# Patient Record
Sex: MALE | Race: WHITE | NOT HISPANIC OR LATINO | Employment: OTHER | URBAN - METROPOLITAN AREA
[De-identification: names, ages, dates, MRNs, and addresses within clinical notes are randomized per-mention and may not be internally consistent; named-entity substitution may affect disease eponyms.]

---

## 2017-04-30 ENCOUNTER — APPOINTMENT (OUTPATIENT)
Dept: RADIOLOGY | Facility: MEDICAL CENTER | Age: 52
DRG: 690 | End: 2017-04-30
Attending: EMERGENCY MEDICINE
Payer: MEDICARE

## 2017-04-30 ENCOUNTER — HOSPITAL ENCOUNTER (INPATIENT)
Facility: MEDICAL CENTER | Age: 52
LOS: 1 days | DRG: 690 | End: 2017-05-01
Attending: EMERGENCY MEDICINE | Admitting: HOSPITALIST
Payer: MEDICARE

## 2017-04-30 ENCOUNTER — RESOLUTE PROFESSIONAL BILLING HOSPITAL PROF FEE (OUTPATIENT)
Dept: HOSPITALIST | Facility: MEDICAL CENTER | Age: 52
End: 2017-04-30
Payer: MEDICARE

## 2017-04-30 DIAGNOSIS — N39.0 ACUTE UTI: ICD-10-CM

## 2017-04-30 DIAGNOSIS — N12 PYELONEPHRITIS: ICD-10-CM

## 2017-04-30 PROBLEM — N17.9 AKI (ACUTE KIDNEY INJURY) (HCC): Status: ACTIVE | Noted: 2017-04-30

## 2017-04-30 PROBLEM — E87.1 HYPONATREMIA: Status: ACTIVE | Noted: 2017-04-30

## 2017-04-30 LAB
ALBUMIN SERPL BCP-MCNC: 3.1 G/DL (ref 3.2–4.9)
ALBUMIN/GLOB SERPL: 0.8 G/DL
ALP SERPL-CCNC: 109 U/L (ref 30–99)
ALT SERPL-CCNC: 16 U/L (ref 2–50)
ANION GAP SERPL CALC-SCNC: 11 MMOL/L (ref 0–11.9)
APPEARANCE UR: ABNORMAL
APPEARANCE UR: ABNORMAL
AST SERPL-CCNC: 28 U/L (ref 12–45)
BACTERIA #/AREA URNS HPF: ABNORMAL /HPF
BASOPHILS # BLD AUTO: 0.9 % (ref 0–1.8)
BASOPHILS # BLD: 0.14 K/UL (ref 0–0.12)
BILIRUB SERPL-MCNC: 1.4 MG/DL (ref 0.1–1.5)
BILIRUB UR QL STRIP.AUTO: NEGATIVE
BUN SERPL-MCNC: 54 MG/DL (ref 8–22)
CALCIUM SERPL-MCNC: 8.6 MG/DL (ref 8.5–10.5)
CHLORIDE SERPL-SCNC: 94 MMOL/L (ref 96–112)
CO2 SERPL-SCNC: 24 MMOL/L (ref 20–33)
COLOR UR AUTO: YELLOW
COLOR UR: YELLOW
CREAT SERPL-MCNC: 2.19 MG/DL (ref 0.5–1.4)
CULTURE IF INDICATED INDCX: YES UA CULTURE
EOSINOPHIL # BLD AUTO: 0.05 K/UL (ref 0–0.51)
EOSINOPHIL NFR BLD: 0.3 % (ref 0–6.9)
ERYTHROCYTE [DISTWIDTH] IN BLOOD BY AUTOMATED COUNT: 58.4 FL (ref 35.9–50)
GFR SERPL CREATININE-BSD FRML MDRD: 32 ML/MIN/1.73 M 2
GLOBULIN SER CALC-MCNC: 4.1 G/DL (ref 1.9–3.5)
GLUCOSE SERPL-MCNC: 108 MG/DL (ref 65–99)
GLUCOSE UR QL STRIP.AUTO: NEGATIVE MG/DL
GLUCOSE UR STRIP.AUTO-MCNC: NEGATIVE MG/DL
HCT VFR BLD AUTO: 61.3 % (ref 42–52)
HGB BLD-MCNC: 19.6 G/DL (ref 14–18)
IMM GRANULOCYTES # BLD AUTO: 0.17 K/UL (ref 0–0.11)
IMM GRANULOCYTES NFR BLD AUTO: 1.1 % (ref 0–0.9)
KETONES UR QL STRIP.AUTO: NEGATIVE MG/DL
KETONES UR STRIP.AUTO-MCNC: NEGATIVE MG/DL
LEUKOCYTE ESTERASE UR QL STRIP.AUTO: ABNORMAL
LEUKOCYTE ESTERASE UR QL STRIP.AUTO: ABNORMAL
LIPASE SERPL-CCNC: 46 U/L (ref 11–82)
LYMPHOCYTES # BLD AUTO: 0.88 K/UL (ref 1–4.8)
LYMPHOCYTES NFR BLD: 5.5 % (ref 22–41)
MCH RBC QN AUTO: 23.8 PG (ref 27–33)
MCHC RBC AUTO-ENTMCNC: 32 G/DL (ref 33.7–35.3)
MCV RBC AUTO: 74.4 FL (ref 81.4–97.8)
MICRO URNS: ABNORMAL
MONOCYTES # BLD AUTO: 1.88 K/UL (ref 0–0.85)
MONOCYTES NFR BLD AUTO: 11.7 % (ref 0–13.4)
MUCOUS THREADS #/AREA URNS HPF: ABNORMAL /HPF
NEUTROPHILS # BLD AUTO: 12.95 K/UL (ref 1.82–7.42)
NEUTROPHILS NFR BLD: 80.5 % (ref 44–72)
NITRITE UR QL STRIP.AUTO: NEGATIVE
NITRITE UR QL STRIP.AUTO: NEGATIVE
NRBC # BLD AUTO: 0 K/UL
NRBC BLD AUTO-RTO: 0 /100 WBC
PH UR STRIP.AUTO: 5 [PH]
PH UR STRIP.AUTO: 5.5 [PH]
PLATELET # BLD AUTO: 175 K/UL (ref 164–446)
POTASSIUM SERPL-SCNC: 3.9 MMOL/L (ref 3.6–5.5)
PROT SERPL-MCNC: 7.2 G/DL (ref 6–8.2)
PROT UR QL STRIP: 100 MG/DL
PROT UR QL STRIP: 50 MG/DL
RBC # BLD AUTO: 8.24 M/UL (ref 4.7–6.1)
RBC # URNS HPF: ABNORMAL /HPF
RBC UR QL AUTO: ABNORMAL
RBC UR QL AUTO: ABNORMAL
SODIUM SERPL-SCNC: 129 MMOL/L (ref 135–145)
SP GR UR STRIP.AUTO: 1.01
SP GR UR: 1.01
WBC # BLD AUTO: 16.1 K/UL (ref 4.8–10.8)
WBC #/AREA URNS HPF: >150 /HPF

## 2017-04-30 PROCEDURE — 83690 ASSAY OF LIPASE: CPT

## 2017-04-30 PROCEDURE — 700101 HCHG RX REV CODE 250: Performed by: EMERGENCY MEDICINE

## 2017-04-30 PROCEDURE — 304562 HCHG STAT O2 MASK/CANNULA

## 2017-04-30 PROCEDURE — 94640 AIRWAY INHALATION TREATMENT: CPT

## 2017-04-30 PROCEDURE — 96361 HYDRATE IV INFUSION ADD-ON: CPT

## 2017-04-30 PROCEDURE — 85025 COMPLETE CBC W/AUTO DIFF WBC: CPT

## 2017-04-30 PROCEDURE — 36415 COLL VENOUS BLD VENIPUNCTURE: CPT

## 2017-04-30 PROCEDURE — 94760 N-INVAS EAR/PLS OXIMETRY 1: CPT

## 2017-04-30 PROCEDURE — 87186 SC STD MICRODIL/AGAR DIL: CPT

## 2017-04-30 PROCEDURE — 80053 COMPREHEN METABOLIC PANEL: CPT

## 2017-04-30 PROCEDURE — 99285 EMERGENCY DEPT VISIT HI MDM: CPT

## 2017-04-30 PROCEDURE — 96375 TX/PRO/DX INJ NEW DRUG ADDON: CPT

## 2017-04-30 PROCEDURE — 87077 CULTURE AEROBIC IDENTIFY: CPT

## 2017-04-30 PROCEDURE — 99223 1ST HOSP IP/OBS HIGH 75: CPT | Mod: 25 | Performed by: HOSPITALIST

## 2017-04-30 PROCEDURE — 700111 HCHG RX REV CODE 636 W/ 250 OVERRIDE (IP): Performed by: EMERGENCY MEDICINE

## 2017-04-30 PROCEDURE — 81002 URINALYSIS NONAUTO W/O SCOPE: CPT

## 2017-04-30 PROCEDURE — 74176 CT ABD & PELVIS W/O CONTRAST: CPT

## 2017-04-30 PROCEDURE — 306637 HCHG MISC ORTHO ITEM RC 0274

## 2017-04-30 PROCEDURE — 87086 URINE CULTURE/COLONY COUNT: CPT

## 2017-04-30 PROCEDURE — 99406 BEHAV CHNG SMOKING 3-10 MIN: CPT | Performed by: HOSPITALIST

## 2017-04-30 PROCEDURE — 96365 THER/PROPH/DIAG IV INF INIT: CPT

## 2017-04-30 PROCEDURE — 81001 URINALYSIS AUTO W/SCOPE: CPT

## 2017-04-30 RX ORDER — CEFTRIAXONE 2 G/1
2 INJECTION, POWDER, FOR SOLUTION INTRAMUSCULAR; INTRAVENOUS ONCE
Status: COMPLETED | OUTPATIENT
Start: 2017-04-30 | End: 2017-04-30

## 2017-04-30 RX ORDER — IPRATROPIUM BROMIDE AND ALBUTEROL SULFATE 2.5; .5 MG/3ML; MG/3ML
3 SOLUTION RESPIRATORY (INHALATION)
Status: DISCONTINUED | OUTPATIENT
Start: 2017-04-30 | End: 2017-05-01

## 2017-04-30 RX ORDER — MORPHINE SULFATE 4 MG/ML
4 INJECTION, SOLUTION INTRAMUSCULAR; INTRAVENOUS ONCE
Status: DISCONTINUED | OUTPATIENT
Start: 2017-04-30 | End: 2017-05-01

## 2017-04-30 RX ORDER — SODIUM CHLORIDE, SODIUM LACTATE, POTASSIUM CHLORIDE, CALCIUM CHLORIDE 600; 310; 30; 20 MG/100ML; MG/100ML; MG/100ML; MG/100ML
INJECTION, SOLUTION INTRAVENOUS ONCE
Status: COMPLETED | OUTPATIENT
Start: 2017-04-30 | End: 2017-04-30

## 2017-04-30 RX ORDER — SODIUM CHLORIDE, SODIUM LACTATE, POTASSIUM CHLORIDE, CALCIUM CHLORIDE 600; 310; 30; 20 MG/100ML; MG/100ML; MG/100ML; MG/100ML
1000 INJECTION, SOLUTION INTRAVENOUS ONCE
Status: DISCONTINUED | OUTPATIENT
Start: 2017-04-30 | End: 2017-05-01

## 2017-04-30 RX ORDER — ONDANSETRON 2 MG/ML
4 INJECTION INTRAMUSCULAR; INTRAVENOUS ONCE
Status: COMPLETED | OUTPATIENT
Start: 2017-04-30 | End: 2017-04-30

## 2017-04-30 RX ADMIN — IPRATROPIUM BROMIDE AND ALBUTEROL SULFATE 3 ML: .5; 3 SOLUTION RESPIRATORY (INHALATION) at 22:48

## 2017-04-30 RX ADMIN — SODIUM CHLORIDE, POTASSIUM CHLORIDE, SODIUM LACTATE AND CALCIUM CHLORIDE 1000 ML: 600; 310; 30; 20 INJECTION, SOLUTION INTRAVENOUS at 22:01

## 2017-04-30 RX ADMIN — CEFTRIAXONE SODIUM 2 G: 2 INJECTION, POWDER, FOR SOLUTION INTRAMUSCULAR; INTRAVENOUS at 23:10

## 2017-04-30 RX ADMIN — ONDANSETRON 4 MG: 2 INJECTION INTRAMUSCULAR; INTRAVENOUS at 21:58

## 2017-04-30 ASSESSMENT — PAIN SCALES - GENERAL: PAINLEVEL_OUTOF10: 7

## 2017-04-30 NOTE — ED NOTES
Pt to triage ..  Chief Complaint   Patient presents with   • Blood in Urine     x 5 days    • Flank Pain     bilateral flank pain

## 2017-05-01 VITALS
DIASTOLIC BLOOD PRESSURE: 76 MMHG | BODY MASS INDEX: 40.23 KG/M2 | HEIGHT: 74 IN | OXYGEN SATURATION: 92 % | WEIGHT: 313.49 LBS | TEMPERATURE: 98.3 F | SYSTOLIC BLOOD PRESSURE: 124 MMHG | RESPIRATION RATE: 20 BRPM | HEART RATE: 67 BPM

## 2017-05-01 PROCEDURE — 700102 HCHG RX REV CODE 250 W/ 637 OVERRIDE(OP): Performed by: HOSPITALIST

## 2017-05-01 PROCEDURE — A9270 NON-COVERED ITEM OR SERVICE: HCPCS | Performed by: HOSPITALIST

## 2017-05-01 RX ORDER — NICOTINE 21 MG/24HR
14 PATCH, TRANSDERMAL 24 HOURS TRANSDERMAL
Status: DISCONTINUED | OUTPATIENT
Start: 2017-05-01 | End: 2017-05-01 | Stop reason: HOSPADM

## 2017-05-01 RX ORDER — TIOTROPIUM BROMIDE 18 UG/1
1 CAPSULE ORAL; RESPIRATORY (INHALATION)
Status: DISCONTINUED | OUTPATIENT
Start: 2017-05-01 | End: 2017-05-01 | Stop reason: HOSPADM

## 2017-05-01 RX ORDER — DEXTROSE MONOHYDRATE 25 G/50ML
25 INJECTION, SOLUTION INTRAVENOUS
Status: DISCONTINUED | OUTPATIENT
Start: 2017-04-30 | End: 2017-05-01 | Stop reason: HOSPADM

## 2017-05-01 RX ORDER — BISACODYL 10 MG
10 SUPPOSITORY, RECTAL RECTAL
Status: DISCONTINUED | OUTPATIENT
Start: 2017-04-30 | End: 2017-05-01 | Stop reason: HOSPADM

## 2017-05-01 RX ORDER — ACETAMINOPHEN 325 MG/1
650 TABLET ORAL EVERY 6 HOURS PRN
Status: DISCONTINUED | OUTPATIENT
Start: 2017-04-30 | End: 2017-05-01 | Stop reason: HOSPADM

## 2017-05-01 RX ORDER — MORPHINE SULFATE 4 MG/ML
2 INJECTION, SOLUTION INTRAMUSCULAR; INTRAVENOUS
Status: DISCONTINUED | OUTPATIENT
Start: 2017-04-30 | End: 2017-05-01 | Stop reason: HOSPADM

## 2017-05-01 RX ORDER — BUDESONIDE AND FORMOTEROL FUMARATE DIHYDRATE 80; 4.5 UG/1; UG/1
2 AEROSOL RESPIRATORY (INHALATION)
Status: DISCONTINUED | OUTPATIENT
Start: 2017-05-01 | End: 2017-05-01 | Stop reason: HOSPADM

## 2017-05-01 RX ORDER — OXYCODONE HYDROCHLORIDE 5 MG/1
2.5 TABLET ORAL
Status: DISCONTINUED | OUTPATIENT
Start: 2017-04-30 | End: 2017-05-01 | Stop reason: HOSPADM

## 2017-05-01 RX ORDER — SODIUM CHLORIDE 9 MG/ML
INJECTION, SOLUTION INTRAVENOUS CONTINUOUS
Status: DISCONTINUED | OUTPATIENT
Start: 2017-05-01 | End: 2017-05-01 | Stop reason: HOSPADM

## 2017-05-01 RX ORDER — AMOXICILLIN 250 MG
2 CAPSULE ORAL 2 TIMES DAILY
Status: DISCONTINUED | OUTPATIENT
Start: 2017-05-01 | End: 2017-05-01 | Stop reason: HOSPADM

## 2017-05-01 RX ORDER — OXYCODONE HYDROCHLORIDE 5 MG/1
5 TABLET ORAL
Status: DISCONTINUED | OUTPATIENT
Start: 2017-04-30 | End: 2017-05-01 | Stop reason: HOSPADM

## 2017-05-01 RX ORDER — POLYETHYLENE GLYCOL 3350 17 G/17G
1 POWDER, FOR SOLUTION ORAL
Status: DISCONTINUED | OUTPATIENT
Start: 2017-04-30 | End: 2017-05-01 | Stop reason: HOSPADM

## 2017-05-01 NOTE — H&P
CHIEF COMPLAINT:  Burning with urination and back pain.    PRIMARY MEDICAL PHYSICIAN:  None.    HISTORY OF PRESENT ILLNESS:  This is a 51-year-old gentleman with reported   history of asthma and COPD, and active tobacco abuse who comes in with   complaints of dysuria and right lower back pain.  The patient states that his   dysuria started seven days ago.  He tried to self-medicate by drinking a lot   of cranberry juice and initially he had some improvement, however, he also had   an associated right costovertebral angle tenderness, which would not go away.    He did think that his back pain was secondary to his bed, which apparently   has a bend and is not supportive.  He has had subjective fevers and shaking   chills.  He denies any nausea, vomiting, diarrhea, or abdominal pain.  He does   have shortness of breath at baseline and is not on any home oxygen.  He   reports a known history of COPD and asthma and is only on nebulizers.  He   states that he attempted to get inhalers from his pharmacist, but was told   that he needed a primary care physician.  When I asked where his albuterol   nebulizer came from, he states that it came from a doctor in [place].  When   I asked him how long he has been in [place], he states that he has been in   [place] for 14 years and has not yet established a primary care provider.  He   denies any chest pain and otherwise, prior to this was in his usual state of   health.    REVIEW OF SYSTEMS:  A full review of systems was completed and all pertinent   positives and negatives are included in the HPI above.    PAST MEDICAL HISTORY:  1.  COPD and asthma.  2.  Tobacco abuse.    PAST SURGICAL HISTORY:  Umbilical hernia repair.    SOCIAL HISTORY:  The patient smokes cigarettes and is down to 1-1/2 cigarettes   per day.  Prior to this, he smoked up to half-a-pack a day and has done so   for more than 20 years.  He denies any alcohol or illicit drugs.    FAMILY HISTORY:  Father  from  heart disease.  Mother  from   complications relating to Parkinson's disease.    ALLERGIES:  PENICILLINS AND ERYTHROMYCIN.    HOME MEDICATIONS:  Albuterol nebulizer only.    PHYSICAL EXAMINATION:  VITAL SIGNS:  Temperature 98.3, heart rate 92, respiration 20, and blood   pressure 124/76.  Patient is saturating at 95% on 2 L of oxygen.  GENERAL:  This is a middle-aged white male who appears much older than his   stated age.  He is in no acute distress.  HEENT:  Normocephalic and atraumatic.  EOMI, dry mucous membranes.  NECK:  The neck is supple.  There is no JVD.  There is no supraclavicular   adenopathy.  CARDIOVASCULAR:  Positive S1, S2, regular rate and rhythm.  No murmurs, rubs,   or gallops appreciated.  PULMONARY:  Clear to auscultation bilaterally.  No wheezes, rubs, or rhonchi   heard.  Very poor air movement, however, diminished at the bases.  BACK:  Right costovertebral angle tenderness.  ABDOMEN:  Soft, nontender, nondistended, and obese.  Positive bowel sounds.    Difficult to assess for hepatosplenomegaly secondary to habitus.  EXTREMITIES:  Warm and well perfused.  The patient has venous stasis changes   of the lower extremities.  Otherwise, capillary refill less than 2 seconds.    Distal pulses are intact.  NEUROLOGICAL:  A and O x3.  Cranial nerves II-XII grossly intact.    LABORATORY DATA:  WBC 16.1, hemoglobin 19.6, hematocrit 61.3, and platelet   175,000.  Sodium 129, potassium 3.9, chloride 94, CO2 24, glucose 108, BUN 54,   creatinine 2.19, and GFR is 32.  Urinalysis is positive for large leukocyte   esterase, bacteria.    DIAGNOSTIC IMAGING:  CT of the abdomen:  No hydronephrosis, tiny left lower   pole calyceal stone, bilateral perinephric fat stranding.  This is frequently   seen as a consequence of aging.  However, it could also indicate   pyelonephritis or other acute renal disease in the appropriate clinical   setting.  Please see full report for details.    ASSESSMENT AND PLAN:  This  is a 51-year-old gentleman who comes in with   complaints of dysuria and right lower back pain.  Upon assessment in the   emergency room, he has evidence of a urinary tract infection and possible   pyelonephritis.  1.  Urinary tract infection and possible pyelonephritis:  Fat stranding as   seen on the CT scan bilaterally.  Patient does have right-sided costovertebral   angle tenderness.  He has leukocytosis, otherwise, he is afebrile with stable   vital signs.  I will continue him on intravenous ceftriaxone and monitor the   urine cultures for sensitivities and speciation.  He will receive symptomatic   management for pain.  2.  Hyponatremia:  Suspected from infection.  He will be receiving intravenous   fluids and we will repeat renal function in the morning.  3.  Acute kidney injury:  The patient's only available renal functions are   from 2013 and at that time, he had no renal dysfunction.  He has no evidence   of hydronephrosis on CT scan.  This is likely prerenal from his infection and   mild dehydration.  He will be receiving intravenous fluids and we will monitor   renal function in the morning.  In the meantime, I will check urine   electrolytes.  4.  Chronic obstructive pulmonary disease, asthma:  The patient is not on   appropriate medications.  I have started him on Spiriva and Symbicort.  I will   also place him on respiratory therapy protocol.  I am unsure why he is   hypoxic, other than the fact that he may potentially have chronic hypoxia from   his chronic obstructive pulmonary disease, which is not currently being   appropriately treated.  I will check a chest x-ray for completeness.  He will   be on respiratory therapy protocol.  5.  Active tobacco abuse:  Patient is trying to quit.  He is down to   one-and-a-half cigarettes per day.  Greater than 3 minutes were spent at bedside on tobacco cessation counseling   and encouragement.  I offered him a nicotine patch, which he has declined at   this  point.  We will continue to offer support during this hospitalization.    DISPOSITION:  Medical floor.    PROPHYLAXIS:  Sequential compression devices for DVT prophylaxis, no PPI   indicated, and stool softeners ordered.    CODE:  Full.    This patient would benefit from a primary medical physician appointment prior   to his discharge to home.       ____________________________________     MD DUARTE ARGUETA / YOSVANY    DD:  05/01/2017 00:20:19  DT:  05/01/2017 01:06:53    D#:  [id number]  Job#:  [id number]

## 2017-05-01 NOTE — ED NOTES
Room 57    Pt c/o flank pain and blood in urine for a week. No hx of kidney stones.  .    Chief Complaint   Patient presents with   • Blood in Urine     x 5 days    • Flank Pain     bilateral flank pain

## 2017-05-01 NOTE — ED NOTES
"Pt called nurse stating \"I want to leave now, I need to go home, I can't stay here any longer\", pt very upset stating \"I've been asking for water for over 4 hours and you just not brought it to me\", asked pt to give this RN a few minutes to contact the admitting doctor before making any decisions and pt agreed to wait \"a few more minutes\", call placed to Dr Moeller.  "

## 2017-05-01 NOTE — ED NOTES
"Med rec updated and complete.  Allergies reviewed.  Pt does have a nebulizer at home  But does not have any medication  For it.  Pt also \"lost his rescue inhaler\".  "

## 2017-05-01 NOTE — ED PROVIDER NOTES
"ED Provider Note    Scribed for Obey Sapp M.D. by Augustine Donnelly. 4/30/2017, 9:43 PM.    Primary care provider: Pcp Pt States None  Means of arrival: Walk-in  History obtained from: Patient  History limited by: None    CHIEF COMPLAINT  Chief Complaint   Patient presents with   • Blood in Urine     x 5 days    • Flank Pain     bilateral flank pain        HPI  Joshua Medrano is a 51 y.o. male who presents to the Emergency Department with bilateral flank pain and hematuria onset approximately seven days ago.  The patient states that he has been trying to drink cranberry juice but has had no luck in relieving the symptoms.  He has had associated fevers but denies any nausea or vomiting.  Rates his pain as 7/10 and notes that it does not radiate.    REVIEW OF SYSTEMS  Pertinent positives include bilateral flank pain, hematuria, fevers. Pertinent negatives include no nausea, vomiting. Otherwise ten systems reviewed and otherwise negative.     PAST MEDICAL HISTORY   has a past medical history of Diabetes; ASTHMA; and Chronic obstructive pulmonary disease (CMS-HCC).    SURGICAL HISTORY   has past surgical history that includes other abdominal surgery.    SOCIAL HISTORY  Social History   Substance Use Topics   • Smoking status: Current Every Day Smoker -- 0.50 packs/day for 20 years     Types: Cigarettes   • Smokeless tobacco: Never Used   • Alcohol Use: No      History   Drug Use No       FAMILY HISTORY  Non-Contributory    CURRENT MEDICATIONS  Home Medications     Reviewed by Vinny Williamson (Pharmacy Tech) on 05/01/17 at 0003  Med List Status: Complete    Medication Last Dose Status          Patient Anthony Taking any Medications                        ALLERGIES  Allergies   Allergen Reactions   • Cillins [Penicillins] Rash     As a child   • Erythromycin Hives       PHYSICAL EXAM  VITAL SIGNS: /76 mmHg  Pulse 74  Temp(Src) 36.8 °C (98.3 °F)  Resp 20  Ht 1.88 m (6' 2\")  Wt 142.2 kg (313 lb 7.9 oz) "  BMI 40.23 kg/m2  SpO2 92%  Pulse ox interpretation: I interpret this pulse ox as normal.  Constitutional: Alert and oriented x 3, Minimal Distress  HEENT: Atraumatic normocephalic, pupils are equal round reactive to light extraocular movements are intact. The nares is clear, external ears are normal, mouth shows moist mucous membranes  Neck: Supple, no JVD no tracheal deviation  Cardiovascular: Regular rate and rhythm no murmur rub or gallop 2+ pulses peripherally x4  Thorax & Lungs: No respiratory distress, no wheezes rales or rhonchi, No chest tenderness.   GI: Soft nontender nondistended positive bowel sounds, no peritoneal signs  : Deferred  Rectal: Deferred  Skin: Warm dry no acute rash or lesion  Musculoskeletal: Moving all extremities with full range and 5 of 5 strength, no acute  deformity  Neurologic: Cranial nerves III through XII are grossly intact, no sensory deficit, no cerebellar dysfunction   Psychiatric: Appropriate affect for situation at this time      DIAGNOSTIC STUDIES / PROCEDURES  LABS  Results for orders placed or performed during the hospital encounter of 04/30/17   CBC WITH DIFFERENTIAL   Result Value Ref Range    WBC 16.1 (H) 4.8 - 10.8 K/uL    RBC 8.24 (H) 4.70 - 6.10 M/uL    Hemoglobin 19.6 (H) 14.0 - 18.0 g/dL    Hematocrit 61.3 (H) 42.0 - 52.0 %    MCV 74.4 (L) 81.4 - 97.8 fL    MCH 23.8 (L) 27.0 - 33.0 pg    MCHC 32.0 (L) 33.7 - 35.3 g/dL    RDW 58.4 (H) 35.9 - 50.0 fL    Platelet Count 175 164 - 446 K/uL    Neutrophils-Polys 80.50 (H) 44.00 - 72.00 %    Lymphocytes 5.50 (L) 22.00 - 41.00 %    Monocytes 11.70 0.00 - 13.40 %    Eosinophils 0.30 0.00 - 6.90 %    Basophils 0.90 0.00 - 1.80 %    Immature Granulocytes 1.10 (H) 0.00 - 0.90 %    Nucleated RBC 0.00 /100 WBC    Neutrophils (Absolute) 12.95 (H) 1.82 - 7.42 K/uL    Lymphs (Absolute) 0.88 (L) 1.00 - 4.80 K/uL    Monos (Absolute) 1.88 (H) 0.00 - 0.85 K/uL    Eos (Absolute) 0.05 0.00 - 0.51 K/uL    Baso (Absolute) 0.14 (H) 0.00  - 0.12 K/uL    Immature Granulocytes (abs) 0.17 (H) 0.00 - 0.11 K/uL    NRBC (Absolute) 0.00 K/uL   COMP METABOLIC PANEL   Result Value Ref Range    Sodium 129 (L) 135 - 145 mmol/L    Potassium 3.9 3.6 - 5.5 mmol/L    Chloride 94 (L) 96 - 112 mmol/L    Co2 24 20 - 33 mmol/L    Anion Gap 11.0 0.0 - 11.9    Glucose 108 (H) 65 - 99 mg/dL    Bun 54 (H) 8 - 22 mg/dL    Creatinine 2.19 (H) 0.50 - 1.40 mg/dL    Calcium 8.6 8.5 - 10.5 mg/dL    AST(SGOT) 28 12 - 45 U/L    ALT(SGPT) 16 2 - 50 U/L    Alkaline Phosphatase 109 (H) 30 - 99 U/L    Total Bilirubin 1.4 0.1 - 1.5 mg/dL    Albumin 3.1 (L) 3.2 - 4.9 g/dL    Total Protein 7.2 6.0 - 8.2 g/dL    Globulin 4.1 (H) 1.9 - 3.5 g/dL    A-G Ratio 0.8 g/dL   LIPASE   Result Value Ref Range    Lipase 46 11 - 82 U/L   ESTIMATED GFR   Result Value Ref Range    GFR If  38 (A) >60 mL/min/1.73 m 2    GFR If Non  32 (A) >60 mL/min/1.73 m 2   URINALYSIS CULTURE, IF INDICATED   Result Value Ref Range    Micro Urine Req Microscopic     Color Yellow     Character Cloudy (A)     Specific Gravity 1.014 <1.035    Ph 5.5 5.0-8.0    Glucose Negative Negative mg/dL    Ketones Negative Negative mg/dL    Protein 50 (A) Negative mg/dL    Bilirubin Negative Negative    Nitrite Negative Negative    Leukocyte Esterase Large (A) Negative    Occult Blood Small (A) Negative    Culture Indicated Yes UA Culture   URINE MICROSCOPIC (W/UA)   Result Value Ref Range    WBC >150 (A) /hpf    RBC 5-10 (A) /hpf    Bacteria Many (A) None /hpf    Mucous Threads Few /hpf   POC UA   Result Value Ref Range    POC Color Yellow     POC Appearance Cloudy (A)     POC Glucose Negative Negative mg/dL    POC Ketones Negative Negative mg/dL    POC Specific Gravity 1.015 1.005-1.030    POC Blood Large (A) Negative    POC Urine PH 5.0 5.0-8.0    POC Protein 100 (A) Negative mg/dL    POC Nitrites Negative Negative    POC Leukocyte Esterase Moderate (A) Negative       All labs reviewed by  me.      RADIOLOGY  CT-RENAL COLIC EVALUATION(A/P W/O)   Final Result      1.  No hydronephrosis   2.  Tiny LEFT lower pole calyceal stone   3.  BILATERAL perinephric fat stranding. This is frequently seen as a consequence of aging however it could also indicate pyelonephritis or other acute renal disease in the appropriate clinical setting.   4.  Splenomegaly   5.  Atherosclerosis   6.  Periumbilical herniae containing fat; incarceration is possible         DX-CHEST-LIMITED (1 VIEW)    (Results Pending)     The radiologist's interpretation of all radiological studies have been reviewed by me.    COURSE & MEDICAL DECISION MAKING  Pertinent Labs & Imaging studies reviewed. (See chart for details)    9:43 PM - Patient seen and examined at bedside. Patient will be treated with lactated ringers, morphine 4 mg, Zofran 4 mg. Ordered a CT Renal Colic, Urinalysis culture, Urine Microscopic w/ UA, POC UA, Estimated GFR, CBC with differential, CMP, Lipase to evaluate his symptoms.     10:59 PM - Ordered lactated ringers, Rocephin 2 g.    11:45 PM I discussed the patient's case and the above findings with Dr. Hughes (Hospitalist) who will admit the patient to the Hospital.      Medical Decision Making: Patient presents with suprapubic abdominal pain dysuria over the last few days now having bilateral flank pain. Labs revealed leukocytosis hemoconcentration hyponatremia and acute renal dysfunction probable prerenal azotemia. At this point feels the patient would benefit from hospitalization given fluids and antibiotics were in the ED and admitted to the hospital for further evaluation and treatment.    DISPOSITION:  Patient will be admitted to the Hospitalist in guarded condition.      FINAL IMPRESSION  1. Acute UTI    2. Pyelonephritis     3. Hyponatremia  4. Acute renal dysfunction  5. Prerenal azotemia      This dictation has been created using voice recognition software and/or scribes. The accuracy of the dictation is  limited by the abilities of the software and the expertise of the scribes. I expect there may be some errors of grammar and possibly content. I made every attempt to manually correct the errors within my dictation. However, errors related to voice recognition software and/or scribes may still exist and should be interpreted within the appropriate context.     IAugustine (Scribe), am scribing for, and in the presence of, Obey Sapp M.D..    Electronically signed by: Augustine Donnelly (Gopiibkaye), 4/30/2017    IObey M.D. personally performed the services described in this documentation, as scribed by Augustine Donnelly in my presence, and it is both accurate and complete.    The note accurately reflects work and decisions made by me.  Obey Sapp  5/1/2017  4:37 AM
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
## 2017-05-02 LAB
BACTERIA UR CULT: ABNORMAL
BACTERIA UR CULT: ABNORMAL
SIGNIFICANT IND 70042: ABNORMAL
SITE SITE: ABNORMAL
SOURCE SOURCE: ABNORMAL

## 2017-05-03 NOTE — ED NOTES
ED Positive Culture Follow-up/Notification Note:    Date: 5/3/17     Patient seen in the ED on 4/30/2017 for bilateral flank pain and hematuria X 7 days. Reports subjective fevers at home, afebrile upon presentation. Leukocytosis noted.    1. Acute UTI    2. Pyelonephritis       There are no discharge medications for this patient.      Allergies: Cillins and Erythromycin     Final cultures:   Results     Procedure Component Value Units Date/Time    URINE CULTURE(NEW) [[id number]]  (Abnormal)  (Susceptibility) Collected:  04/30/17 1747    Order Status:  Completed Specimen Information:  Urine Updated:  05/02/17 0807     Significant Indicator POS (POS)      Source UR      Site --      Urine Culture -- (A)      Urine Culture -- (A)      Result:        Escherichia coli  >100,000 cfu/mL      Culture & Susceptibility     ESCHERICHIA COLI     Antibiotic Sensitivity Microscan Unit Status    Ampicillin Sensitive <=8 mcg/mL Final    Cefepime Sensitive <=8 mcg/mL Final    Cefotaxime Sensitive <=2 mcg/mL Final    Cefotetan Sensitive <=16 mcg/mL Final    Ceftazidime Sensitive <=1 mcg/mL Final    Ceftriaxone Sensitive <=8 mcg/mL Final    Cefuroxime Sensitive <=4 mcg/mL Final    Cephalothin Sensitive <=8 mcg/mL Final    Ciprofloxacin Sensitive <=1 mcg/mL Final    Gentamicin Sensitive <=4 mcg/mL Final    Levofloxacin Sensitive <=2 mcg/mL Final    Nitrofurantoin Sensitive <=32 mcg/mL Final    Pip/Tazobactam Sensitive <=16 mcg/mL Final    Piperacillin Sensitive <=16 mcg/mL Final    Tigecycline Sensitive <=2 mcg/mL Final    Tobramycin Sensitive <=4 mcg/mL Final    Trimeth/Sulfa Sensitive <=2/38 mcg/mL Final                       URINALYSIS CULTURE, IF INDICATED [[id number]]  (Abnormal) Collected:  04/30/17 1747    Order Status:  Completed Specimen Information:  Other from Urine, Clean Catch Updated:  04/30/17 2151     Micro Urine Req Microscopic      Color Yellow      Character Cloudy (A)      Specific Gravity 1.014      Ph 5.5       Glucose Negative mg/dL      Ketones Negative mg/dL      Protein 50 (A) mg/dL      Bilirubin Negative      Nitrite Negative      Leukocyte Esterase Large (A)      Occult Blood Small (A)      Culture Indicated Yes UA Culture           Plan:   Urine culture grew E coli.  Plan to admit patient to hospital for treatment, but patient left AMA. No follow up indicated.    Ignacio García, PharmD

## 2017-11-19 ENCOUNTER — APPOINTMENT (OUTPATIENT)
Dept: RADIOLOGY | Facility: MEDICAL CENTER | Age: 52
DRG: 280 | End: 2017-11-19
Attending: EMERGENCY MEDICINE
Payer: MEDICARE

## 2017-11-19 ENCOUNTER — HOSPITAL ENCOUNTER (INPATIENT)
Facility: MEDICAL CENTER | Age: 52
LOS: 5 days | DRG: 280 | End: 2017-11-24
Attending: EMERGENCY MEDICINE | Admitting: HOSPITALIST
Payer: MEDICARE

## 2017-11-19 ENCOUNTER — RESOLUTE PROFESSIONAL BILLING HOSPITAL PROF FEE (OUTPATIENT)
Dept: HOSPITALIST | Facility: MEDICAL CENTER | Age: 52
End: 2017-11-19
Payer: MEDICARE

## 2017-11-19 DIAGNOSIS — I50.9 ACUTE CONGESTIVE HEART FAILURE, UNSPECIFIED CONGESTIVE HEART FAILURE TYPE: ICD-10-CM

## 2017-11-19 DIAGNOSIS — R06.02 SHORTNESS OF BREATH: ICD-10-CM

## 2017-11-19 PROBLEM — J44.1 COPD EXACERBATION (HCC): Status: ACTIVE | Noted: 2017-11-19

## 2017-11-19 PROBLEM — I42.9 CARDIOMYOPATHY (HCC): Status: ACTIVE | Noted: 2017-11-19

## 2017-11-19 PROBLEM — I63.9 CVA (CEREBRAL VASCULAR ACCIDENT) (HCC): Status: ACTIVE | Noted: 2017-11-19

## 2017-11-19 PROBLEM — I63.9 CVA (CEREBRAL VASCULAR ACCIDENT) (HCC): Status: RESOLVED | Noted: 2017-11-19 | Resolved: 2017-11-19

## 2017-11-19 PROBLEM — J81.0 ACUTE PULMONARY EDEMA (HCC): Status: ACTIVE | Noted: 2017-11-19

## 2017-11-19 PROBLEM — J18.9 PNEUMONIA: Status: ACTIVE | Noted: 2017-11-19

## 2017-11-19 PROBLEM — I21.4 NSTEMI (NON-ST ELEVATED MYOCARDIAL INFARCTION) (HCC): Status: ACTIVE | Noted: 2017-11-19

## 2017-11-19 LAB
ALBUMIN SERPL BCP-MCNC: 3.4 G/DL (ref 3.2–4.9)
ALBUMIN/GLOB SERPL: 0.9 G/DL
ALP SERPL-CCNC: 123 U/L (ref 30–99)
ALT SERPL-CCNC: 13 U/L (ref 2–50)
ANION GAP SERPL CALC-SCNC: 7 MMOL/L (ref 0–11.9)
APTT PPP: 33.3 SEC (ref 24.7–36)
AST SERPL-CCNC: 17 U/L (ref 12–45)
BASOPHILS # BLD AUTO: 1.2 % (ref 0–1.8)
BASOPHILS # BLD: 0.15 K/UL (ref 0–0.12)
BILIRUB SERPL-MCNC: 1.1 MG/DL (ref 0.1–1.5)
BNP SERPL-MCNC: 930 PG/ML (ref 0–100)
BUN SERPL-MCNC: 18 MG/DL (ref 8–22)
CALCIUM SERPL-MCNC: 9.3 MG/DL (ref 8.5–10.5)
CHLORIDE SERPL-SCNC: 105 MMOL/L (ref 96–112)
CO2 SERPL-SCNC: 24 MMOL/L (ref 20–33)
CREAT SERPL-MCNC: 1.31 MG/DL (ref 0.5–1.4)
EOSINOPHIL # BLD AUTO: 0.3 K/UL (ref 0–0.51)
EOSINOPHIL NFR BLD: 2.5 % (ref 0–6.9)
ERYTHROCYTE [DISTWIDTH] IN BLOOD BY AUTOMATED COUNT: 62.6 FL (ref 35.9–50)
EST. AVERAGE GLUCOSE BLD GHB EST-MCNC: 151 MG/DL
GFR SERPL CREATININE-BSD FRML MDRD: 57 ML/MIN/1.73 M 2
GLOBULIN SER CALC-MCNC: 3.9 G/DL (ref 1.9–3.5)
GLUCOSE SERPL-MCNC: 128 MG/DL (ref 65–99)
HBA1C MFR BLD: 6.9 % (ref 0–5.6)
HCT VFR BLD AUTO: 49.3 % (ref 42–52)
HGB BLD-MCNC: 15.4 G/DL (ref 14–18)
IMM GRANULOCYTES # BLD AUTO: 0.13 K/UL (ref 0–0.11)
IMM GRANULOCYTES NFR BLD AUTO: 1.1 % (ref 0–0.9)
LACTATE BLD-SCNC: 2.1 MMOL/L (ref 0.5–2)
LACTATE BLD-SCNC: 2.2 MMOL/L (ref 0.5–2)
LACTATE BLD-SCNC: 2.4 MMOL/L (ref 0.5–2)
LV EJECT FRACT  99904: 35
LV EJECT FRACT MOD 2C 99903: 31.34
LV EJECT FRACT MOD 4C 99902: 38.79
LV EJECT FRACT MOD BP 99901: 35.21
LYMPHOCYTES # BLD AUTO: 0.8 K/UL (ref 1–4.8)
LYMPHOCYTES NFR BLD: 6.6 % (ref 22–41)
MCH RBC QN AUTO: 25.7 PG (ref 27–33)
MCHC RBC AUTO-ENTMCNC: 31.2 G/DL (ref 33.7–35.3)
MCV RBC AUTO: 82.2 FL (ref 81.4–97.8)
MONOCYTES # BLD AUTO: 0.67 K/UL (ref 0–0.85)
MONOCYTES NFR BLD AUTO: 5.5 % (ref 0–13.4)
NEUTROPHILS # BLD AUTO: 10.11 K/UL (ref 1.82–7.42)
NEUTROPHILS NFR BLD: 83.1 % (ref 44–72)
NRBC # BLD AUTO: 0 K/UL
NRBC BLD AUTO-RTO: 0 /100 WBC
PLATELET # BLD AUTO: 290 K/UL (ref 164–446)
PMV BLD AUTO: 10 FL (ref 9–12.9)
POTASSIUM SERPL-SCNC: 4.5 MMOL/L (ref 3.6–5.5)
PROCALCITONIN SERPL-MCNC: 0.15 NG/ML
PROT SERPL-MCNC: 7.3 G/DL (ref 6–8.2)
RBC # BLD AUTO: 6 M/UL (ref 4.7–6.1)
SODIUM SERPL-SCNC: 136 MMOL/L (ref 135–145)
TROPONIN I SERPL-MCNC: 1.95 NG/ML (ref 0–0.04)
TROPONIN I SERPL-MCNC: 2.51 NG/ML (ref 0–0.04)
WBC # BLD AUTO: 12.2 K/UL (ref 4.8–10.8)

## 2017-11-19 PROCEDURE — 80053 COMPREHEN METABOLIC PANEL: CPT

## 2017-11-19 PROCEDURE — 700101 HCHG RX REV CODE 250: Performed by: EMERGENCY MEDICINE

## 2017-11-19 PROCEDURE — 99223 1ST HOSP IP/OBS HIGH 75: CPT | Mod: AI | Performed by: HOSPITALIST

## 2017-11-19 PROCEDURE — 770020 HCHG ROOM/CARE - TELE (206)

## 2017-11-19 PROCEDURE — 84484 ASSAY OF TROPONIN QUANT: CPT

## 2017-11-19 PROCEDURE — 71010 DX-CHEST-PORTABLE (1 VIEW): CPT

## 2017-11-19 PROCEDURE — 96374 THER/PROPH/DIAG INJ IV PUSH: CPT

## 2017-11-19 PROCEDURE — 36415 COLL VENOUS BLD VENIPUNCTURE: CPT

## 2017-11-19 PROCEDURE — 700102 HCHG RX REV CODE 250 W/ 637 OVERRIDE(OP): Performed by: HOSPITALIST

## 2017-11-19 PROCEDURE — A9270 NON-COVERED ITEM OR SERVICE: HCPCS | Performed by: HOSPITALIST

## 2017-11-19 PROCEDURE — 82962 GLUCOSE BLOOD TEST: CPT

## 2017-11-19 PROCEDURE — 99285 EMERGENCY DEPT VISIT HI MDM: CPT

## 2017-11-19 PROCEDURE — 85730 THROMBOPLASTIN TIME PARTIAL: CPT

## 2017-11-19 PROCEDURE — 83036 HEMOGLOBIN GLYCOSYLATED A1C: CPT

## 2017-11-19 PROCEDURE — 87205 SMEAR GRAM STAIN: CPT

## 2017-11-19 PROCEDURE — 83880 ASSAY OF NATRIURETIC PEPTIDE: CPT

## 2017-11-19 PROCEDURE — 94760 N-INVAS EAR/PLS OXIMETRY 1: CPT

## 2017-11-19 PROCEDURE — 83605 ASSAY OF LACTIC ACID: CPT

## 2017-11-19 PROCEDURE — 93306 TTE W/DOPPLER COMPLETE: CPT

## 2017-11-19 PROCEDURE — 87040 BLOOD CULTURE FOR BACTERIA: CPT

## 2017-11-19 PROCEDURE — 85025 COMPLETE CBC W/AUTO DIFF WBC: CPT

## 2017-11-19 PROCEDURE — 87070 CULTURE OTHR SPECIMN AEROBIC: CPT

## 2017-11-19 PROCEDURE — 700111 HCHG RX REV CODE 636 W/ 250 OVERRIDE (IP): Performed by: HOSPITALIST

## 2017-11-19 PROCEDURE — 96375 TX/PRO/DX INJ NEW DRUG ADDON: CPT

## 2017-11-19 PROCEDURE — 84145 PROCALCITONIN (PCT): CPT

## 2017-11-19 PROCEDURE — 93005 ELECTROCARDIOGRAM TRACING: CPT | Performed by: EMERGENCY MEDICINE

## 2017-11-19 PROCEDURE — 93306 TTE W/DOPPLER COMPLETE: CPT | Mod: 26 | Performed by: INTERNAL MEDICINE

## 2017-11-19 PROCEDURE — 700111 HCHG RX REV CODE 636 W/ 250 OVERRIDE (IP): Performed by: EMERGENCY MEDICINE

## 2017-11-19 PROCEDURE — 94640 AIRWAY INHALATION TREATMENT: CPT

## 2017-11-19 PROCEDURE — 700101 HCHG RX REV CODE 250: Performed by: HOSPITALIST

## 2017-11-19 RX ORDER — DEXTROSE MONOHYDRATE 25 G/50ML
25 INJECTION, SOLUTION INTRAVENOUS
Status: DISCONTINUED | OUTPATIENT
Start: 2017-11-19 | End: 2017-11-24 | Stop reason: HOSPADM

## 2017-11-19 RX ORDER — DOXYCYCLINE 100 MG/1
100 TABLET ORAL EVERY 12 HOURS
Status: COMPLETED | OUTPATIENT
Start: 2017-11-19 | End: 2017-11-21

## 2017-11-19 RX ORDER — ONDANSETRON 2 MG/ML
4 INJECTION INTRAMUSCULAR; INTRAVENOUS EVERY 4 HOURS PRN
Status: DISCONTINUED | OUTPATIENT
Start: 2017-11-19 | End: 2017-11-24 | Stop reason: HOSPADM

## 2017-11-19 RX ORDER — ACETAMINOPHEN 325 MG/1
650 TABLET ORAL EVERY 6 HOURS PRN
Status: DISCONTINUED | OUTPATIENT
Start: 2017-11-19 | End: 2017-11-19

## 2017-11-19 RX ORDER — ONDANSETRON 4 MG/1
4 TABLET, ORALLY DISINTEGRATING ORAL EVERY 4 HOURS PRN
Status: DISCONTINUED | OUTPATIENT
Start: 2017-11-19 | End: 2017-11-24 | Stop reason: HOSPADM

## 2017-11-19 RX ORDER — HEPARIN SODIUM 1000 [USP'U]/ML
5000 INJECTION, SOLUTION INTRAVENOUS; SUBCUTANEOUS PRN
Status: DISCONTINUED | OUTPATIENT
Start: 2017-11-19 | End: 2017-11-24 | Stop reason: HOSPADM

## 2017-11-19 RX ORDER — ASPIRIN 300 MG/1
300 SUPPOSITORY RECTAL DAILY
Status: DISCONTINUED | OUTPATIENT
Start: 2017-11-20 | End: 2017-11-19

## 2017-11-19 RX ORDER — AMOXICILLIN 250 MG
2 CAPSULE ORAL 2 TIMES DAILY
Status: DISCONTINUED | OUTPATIENT
Start: 2017-11-19 | End: 2017-11-19

## 2017-11-19 RX ORDER — HEPARIN SODIUM 5000 [USP'U]/ML
5000 INJECTION, SOLUTION INTRAVENOUS; SUBCUTANEOUS EVERY 8 HOURS
Status: DISCONTINUED | OUTPATIENT
Start: 2017-11-19 | End: 2017-11-19

## 2017-11-19 RX ORDER — ACETAMINOPHEN 325 MG/1
650 TABLET ORAL EVERY 6 HOURS PRN
Status: DISCONTINUED | OUTPATIENT
Start: 2017-11-19 | End: 2017-11-24 | Stop reason: HOSPADM

## 2017-11-19 RX ORDER — ASPIRIN 81 MG/1
324 TABLET, CHEWABLE ORAL DAILY
Status: DISCONTINUED | OUTPATIENT
Start: 2017-11-20 | End: 2017-11-19

## 2017-11-19 RX ORDER — BISACODYL 10 MG
10 SUPPOSITORY, RECTAL RECTAL
Status: DISCONTINUED | OUTPATIENT
Start: 2017-11-19 | End: 2017-11-24 | Stop reason: HOSPADM

## 2017-11-19 RX ORDER — FUROSEMIDE 10 MG/ML
40 INJECTION INTRAMUSCULAR; INTRAVENOUS ONCE
Status: COMPLETED | OUTPATIENT
Start: 2017-11-19 | End: 2017-11-19

## 2017-11-19 RX ORDER — SODIUM CHLORIDE 9 MG/ML
INJECTION, SOLUTION INTRAVENOUS CONTINUOUS
Status: DISCONTINUED | OUTPATIENT
Start: 2017-11-19 | End: 2017-11-19

## 2017-11-19 RX ORDER — AMOXICILLIN 250 MG
2 CAPSULE ORAL 2 TIMES DAILY
Status: DISCONTINUED | OUTPATIENT
Start: 2017-11-20 | End: 2017-11-24 | Stop reason: HOSPADM

## 2017-11-19 RX ORDER — POTASSIUM CHLORIDE 20 MEQ/1
10 TABLET, EXTENDED RELEASE ORAL DAILY
Status: DISCONTINUED | OUTPATIENT
Start: 2017-11-20 | End: 2017-11-19

## 2017-11-19 RX ORDER — POTASSIUM CHLORIDE 20 MEQ/1
10 TABLET, EXTENDED RELEASE ORAL DAILY
Status: DISCONTINUED | OUTPATIENT
Start: 2017-11-20 | End: 2017-11-23

## 2017-11-19 RX ORDER — LABETALOL HYDROCHLORIDE 5 MG/ML
10 INJECTION, SOLUTION INTRAVENOUS EVERY 4 HOURS PRN
Status: DISCONTINUED | OUTPATIENT
Start: 2017-11-19 | End: 2017-11-19

## 2017-11-19 RX ORDER — PROMETHAZINE HYDROCHLORIDE 25 MG/1
12.5-25 TABLET ORAL EVERY 4 HOURS PRN
Status: DISCONTINUED | OUTPATIENT
Start: 2017-11-19 | End: 2017-11-24 | Stop reason: HOSPADM

## 2017-11-19 RX ORDER — PREDNISONE 20 MG/1
40 TABLET ORAL DAILY
Status: DISCONTINUED | OUTPATIENT
Start: 2017-11-20 | End: 2017-11-20

## 2017-11-19 RX ORDER — PROMETHAZINE HYDROCHLORIDE 25 MG/1
12.5-25 SUPPOSITORY RECTAL EVERY 4 HOURS PRN
Status: DISCONTINUED | OUTPATIENT
Start: 2017-11-19 | End: 2017-11-19

## 2017-11-19 RX ORDER — ATORVASTATIN CALCIUM 40 MG/1
40 TABLET, FILM COATED ORAL
Status: DISCONTINUED | OUTPATIENT
Start: 2017-11-19 | End: 2017-11-24 | Stop reason: HOSPADM

## 2017-11-19 RX ORDER — ONDANSETRON 2 MG/ML
4 INJECTION INTRAMUSCULAR; INTRAVENOUS EVERY 4 HOURS PRN
Status: DISCONTINUED | OUTPATIENT
Start: 2017-11-19 | End: 2017-11-19

## 2017-11-19 RX ORDER — PROMETHAZINE HYDROCHLORIDE 25 MG/1
12.5-25 SUPPOSITORY RECTAL EVERY 4 HOURS PRN
Status: DISCONTINUED | OUTPATIENT
Start: 2017-11-19 | End: 2017-11-24 | Stop reason: HOSPADM

## 2017-11-19 RX ORDER — ONDANSETRON 4 MG/1
4 TABLET, ORALLY DISINTEGRATING ORAL EVERY 4 HOURS PRN
Status: DISCONTINUED | OUTPATIENT
Start: 2017-11-19 | End: 2017-11-19

## 2017-11-19 RX ORDER — PROMETHAZINE HYDROCHLORIDE 25 MG/1
12.5-25 TABLET ORAL EVERY 4 HOURS PRN
Status: DISCONTINUED | OUTPATIENT
Start: 2017-11-19 | End: 2017-11-19

## 2017-11-19 RX ORDER — BISACODYL 10 MG
10 SUPPOSITORY, RECTAL RECTAL
Status: DISCONTINUED | OUTPATIENT
Start: 2017-11-19 | End: 2017-11-19

## 2017-11-19 RX ORDER — POLYETHYLENE GLYCOL 3350 17 G/17G
1 POWDER, FOR SOLUTION ORAL
Status: DISCONTINUED | OUTPATIENT
Start: 2017-11-19 | End: 2017-11-19

## 2017-11-19 RX ORDER — PREDNISONE 20 MG/1
60 TABLET ORAL ONCE
Status: COMPLETED | OUTPATIENT
Start: 2017-11-19 | End: 2017-11-19

## 2017-11-19 RX ORDER — POLYETHYLENE GLYCOL 3350 17 G/17G
1 POWDER, FOR SOLUTION ORAL
Status: DISCONTINUED | OUTPATIENT
Start: 2017-11-19 | End: 2017-11-24 | Stop reason: HOSPADM

## 2017-11-19 RX ORDER — SODIUM CHLORIDE 9 MG/ML
500 INJECTION, SOLUTION INTRAVENOUS
Status: DISCONTINUED | OUTPATIENT
Start: 2017-11-19 | End: 2017-11-24 | Stop reason: HOSPADM

## 2017-11-19 RX ORDER — FUROSEMIDE 10 MG/ML
40 INJECTION INTRAMUSCULAR; INTRAVENOUS DAILY
Status: DISCONTINUED | OUTPATIENT
Start: 2017-11-20 | End: 2017-11-24

## 2017-11-19 RX ORDER — ASPIRIN 325 MG
325 TABLET ORAL DAILY
Status: DISCONTINUED | OUTPATIENT
Start: 2017-11-20 | End: 2017-11-19

## 2017-11-19 RX ORDER — HYDRALAZINE HYDROCHLORIDE 20 MG/ML
10 INJECTION INTRAMUSCULAR; INTRAVENOUS
Status: DISCONTINUED | OUTPATIENT
Start: 2017-11-19 | End: 2017-11-19

## 2017-11-19 RX ORDER — HEPARIN SODIUM 1000 [USP'U]/ML
9000 INJECTION, SOLUTION INTRAVENOUS; SUBCUTANEOUS ONCE
Status: COMPLETED | OUTPATIENT
Start: 2017-11-19 | End: 2017-11-19

## 2017-11-19 RX ORDER — IPRATROPIUM BROMIDE AND ALBUTEROL SULFATE 2.5; .5 MG/3ML; MG/3ML
3 SOLUTION RESPIRATORY (INHALATION)
Status: DISCONTINUED | OUTPATIENT
Start: 2017-11-19 | End: 2017-11-24 | Stop reason: HOSPADM

## 2017-11-19 RX ORDER — FUROSEMIDE 10 MG/ML
40 INJECTION INTRAMUSCULAR; INTRAVENOUS DAILY
Status: DISCONTINUED | OUTPATIENT
Start: 2017-11-20 | End: 2017-11-19

## 2017-11-19 RX ORDER — ATORVASTATIN CALCIUM 20 MG/1
80 TABLET, FILM COATED ORAL EVERY EVENING
Status: DISCONTINUED | OUTPATIENT
Start: 2017-11-19 | End: 2017-11-19

## 2017-11-19 RX ORDER — ASPIRIN 325 MG
325 TABLET ORAL DAILY
Status: DISCONTINUED | OUTPATIENT
Start: 2017-11-19 | End: 2017-11-24

## 2017-11-19 RX ADMIN — INSULIN HUMAN 2 UNITS: 100 INJECTION, SOLUTION PARENTERAL at 23:42

## 2017-11-19 RX ADMIN — ALBUTEROL SULFATE 10 MG: 5 SOLUTION RESPIRATORY (INHALATION) at 16:06

## 2017-11-19 RX ADMIN — ASPIRIN 325 MG: 325 TABLET, COATED ORAL at 21:30

## 2017-11-19 RX ADMIN — HEPARIN SODIUM 1950 UNITS/HR: 5000 INJECTION, SOLUTION INTRAVENOUS at 23:39

## 2017-11-19 RX ADMIN — PREDNISONE 60 MG: 20 TABLET ORAL at 17:18

## 2017-11-19 RX ADMIN — METOPROLOL TARTRATE 25 MG: 25 TABLET, FILM COATED ORAL at 21:30

## 2017-11-19 RX ADMIN — IPRATROPIUM BROMIDE AND ALBUTEROL SULFATE 3 ML: .5; 3 SOLUTION RESPIRATORY (INHALATION) at 23:24

## 2017-11-19 RX ADMIN — ATORVASTATIN CALCIUM 40 MG: 40 TABLET, FILM COATED ORAL at 21:29

## 2017-11-19 RX ADMIN — HEPARIN SODIUM 9000 UNITS: 1000 INJECTION, SOLUTION INTRAVENOUS; SUBCUTANEOUS at 23:39

## 2017-11-19 RX ADMIN — DOXYCYCLINE 100 MG: 100 TABLET ORAL at 21:30

## 2017-11-19 RX ADMIN — CEFTRIAXONE 2 G: 2 INJECTION, POWDER, FOR SOLUTION INTRAMUSCULAR; INTRAVENOUS at 19:06

## 2017-11-19 RX ADMIN — HEPARIN SODIUM 5000 UNITS: 5000 INJECTION, SOLUTION INTRAVENOUS; SUBCUTANEOUS at 21:29

## 2017-11-19 RX ADMIN — FUROSEMIDE 40 MG: 10 INJECTION, SOLUTION INTRAMUSCULAR; INTRAVENOUS at 17:21

## 2017-11-19 ASSESSMENT — COGNITIVE AND FUNCTIONAL STATUS - GENERAL
MOVING TO AND FROM BED TO CHAIR: A LITTLE
HELP NEEDED FOR BATHING: A LITTLE
EATING MEALS: A LITTLE
SUGGESTED CMS G CODE MODIFIER DAILY ACTIVITY: CK
WALKING IN HOSPITAL ROOM: A LITTLE
MOVING FROM LYING ON BACK TO SITTING ON SIDE OF FLAT BED: A LITTLE
SUGGESTED CMS G CODE MODIFIER MOBILITY: CK
DAILY ACTIVITIY SCORE: 18
TOILETING: A LITTLE
DRESSING REGULAR UPPER BODY CLOTHING: A LITTLE
TURNING FROM BACK TO SIDE WHILE IN FLAT BAD: A LITTLE
DRESSING REGULAR LOWER BODY CLOTHING: A LITTLE
STANDING UP FROM CHAIR USING ARMS: A LITTLE
CLIMB 3 TO 5 STEPS WITH RAILING: A LITTLE
MOBILITY SCORE: 18
PERSONAL GROOMING: A LITTLE

## 2017-11-19 ASSESSMENT — PAIN SCALES - GENERAL
PAINLEVEL_OUTOF10: 0
PAINLEVEL_OUTOF10: 0
PAINLEVEL_OUTOF10: 8

## 2017-11-19 ASSESSMENT — COPD QUESTIONNAIRES
HAVE YOU SMOKED AT LEAST 100 CIGARETTES IN YOUR ENTIRE LIFE: YES
DURING THE PAST 4 WEEKS HOW MUCH DID YOU FEEL SHORT OF BREATH: NONE/LITTLE OF THE TIME
DO YOU EVER COUGH UP ANY MUCUS OR PHLEGM?: NO/ONLY WITH OCCASIONAL COLDS OR INFECTIONS
COPD SCREENING SCORE: 3

## 2017-11-19 ASSESSMENT — PATIENT HEALTH QUESTIONNAIRE - PHQ9
SUM OF ALL RESPONSES TO PHQ QUESTIONS 1-9: 0
SUM OF ALL RESPONSES TO PHQ9 QUESTIONS 1 AND 2: 0
2. FEELING DOWN, DEPRESSED, IRRITABLE, OR HOPELESS: NOT AT ALL
1. LITTLE INTEREST OR PLEASURE IN DOING THINGS: NOT AT ALL

## 2017-11-19 ASSESSMENT — LIFESTYLE VARIABLES: EVER_SMOKED: YES

## 2017-11-19 NOTE — ED NOTES
"Chief Complaint   Patient presents with   • Cough     x 4 days. Productive cough today states white.    • Shortness of Breath     Pt speaking in full sentences. States he has a hx of COPD and asthma. Ran out of rescue inhaler about a year ago but has done nebulized tx PRN. Pt had PNA last year and states sx feel the same.  /95   Pulse 90   Temp 36.4 °C (97.6 °F) (Temporal)   Resp 18   Ht 1.956 m (6' 5\")   Wt (!) 159 kg (350 lb 8.5 oz)   SpO2 97%   BMI 41.57 kg/m²   Pt placed back in lobby, educated on triage process, and told to inform staff of any change in condition.     "

## 2017-11-19 NOTE — ED PROVIDER NOTES
"ED Provider Note    CHIEF COMPLAINT  Chief Complaint   Patient presents with   • Cough     x 4 days. Productive cough today states white.    • Shortness of Breath       HPI  Joshua Medrano is a 51 y.o. male who presentsWith shortness of breath, for the last 4 or 5 days, PND or orthopnea dyspnea on exertion, fever 2 days ago. Coughing up white sputum. No chest pain no vomiting no diarrhea. History of similar episodes in the past with pneumonia.. Symptoms gradual in onset.    REVIEW OF SYSTEMS  See HPI for further details. History of asthma COPD not on home oxygen history of diabetes, peripheral vascular disease, venous stasis changes lower legsDenies other G.I., G.U.. endrocine, cardiovascular, respriatory or neurological problems.  All other systems are negative.     PAST MEDICAL HISTORY  Past Medical History:   Diagnosis Date   • ASTHMA    • Chronic obstructive pulmonary disease (CMS-[facility])    • Diabetes        FAMILY HISTORY  No family history on file.    SOCIAL HISTORY  Social History     Social History   • Marital status:      Spouse name: N/A   • Number of children: N/A   • Years of education: N/A     Social History Main Topics   • Smoking status: Current Every Day Smoker     Packs/day: 0.50     Years: 20.00     Types: Cigarettes   • Smokeless tobacco: Never Used   • Alcohol use No   • Drug use: No   • Sexual activity: Not on file     Other Topics Concern   • Not on file     Social History Narrative   • No narrative on file       SURGICAL HISTORY  Past Surgical History:   Procedure Laterality Date   • OTHER ABDOMINAL SURGERY         CURRENT MEDICATIONS  Home Medications    **Home medications have not yet been reviewed for this encounter**         ALLERGIES  Allergies   Allergen Reactions   • Cillins [Penicillins] Rash     As a child   • Erythromycin Hives       PHYSICAL EXAM  VITAL SIGNS: /95   Pulse 90   Temp 36.4 °C (97.6 °F) (Temporal)   Resp 18   Ht 1.956 m (6' 5\")   Wt (!) 159 kg (350 lb " 8.5 oz)   SpO2 97%   BMI 41.57 kg/m²    Constitutional: Well developed, Well nourished,He is markedly obese No acute distress, Non-toxic appearance.   HENT: Normocephalic, Atraumatic, Bilateral external ears normal, Oropharynx moist, No oral exudates, Nose normal.   Eyes: PERRL, EOMI, Conjunctiva normal, No discharge.   Neck: Normal range of motion, No tenderness, Supple, No stridor.   Lymphatic: No lymphadenopathy noted.   Cardiovascular: Normal heart rate, Normal rhythm, No murmurs, No rubs, No gallops.   Thorax & Lungs: Normal breath sounds, No respiratory distress, No wheezing, No chest tenderness.   Abdomen:  No tenderness, no guarding no rigidity and the abdomen is soft.  No masses, No pulsatile masses.  Skin: Warm, Dry, venous stasis changes, discoloration, both lower legs. Unchanged from previous according to him  Back: No tenderness, No CVA tenderness.   Extremities: Intact distal pulses, No edema, +1 pitting edema both ankles, No cyanosis, No clubbing.   Musculoskeletal: Good range of motion in all major joints. No tenderness to palpation or major deformities noted.   Neurologic: Alert & oriented x 3, Normal motor function, Normal sensory function, No focal deficits noted.   Psychiatric: Affect normal, Judgment normal, Mood normal.   EKG Interpretation    Interpreted by me    Rhythm: normal sinus   Rate: normal  Axis: -45     Ectopy: none  Conduction: normal  ST Segments: Slight ST elevation, V4 V5.     T Waves: no acute change  Q Waves: none    Clinical Impression: I do not have an old EKG to compare to      RADIOLOGY/PROCEDURES  DX-CHEST-PORTABLE (1 VIEW)   Final Result      Moderate pulmonary edema            COURSE & MEDICAL DECISION MAKING  Pertinent Labs & Imaging studies reviewed. (See chart for details) white count elevated at 12.2 hematocrit, platelet count is normal. There is 83 polys. Electrolytes normal renal function normal liver functions normal, BNP, 930 troponin 2.51 lactic acid  2.1,      He is complaining of shortness of breath coughing, coughing up some white sputum. Fever 2 days ago concern for pneumonia    Today his troponin is elevated although I do not see evidence of obvious infarction on his EKG he also has elevated BNP,. His BNP and chest x-ray consistent with pulmonary edema. He is given Lasix, I will talk with the hospitalist about admission. Cardiology about the elevated troponin      FINAL IMPRESSION  1.   1. Shortness of breath    2. Acute congestive heart failure, unspecified congestive heart failure type (CMS-HCC)            2.   3.     Disposition  I have talked with hospitalist, who will admit, cardiology consulting  Electronically signed by: Dheeraj Sorensen, 11/19/2017 3:40 PM

## 2017-11-20 PROBLEM — E11.9 TYPE 2 DIABETES MELLITUS (HCC): Status: ACTIVE | Noted: 2017-11-20

## 2017-11-20 LAB
ANION GAP SERPL CALC-SCNC: 10 MMOL/L (ref 0–11.9)
APTT PPP: 46.2 SEC (ref 24.7–36)
APTT PPP: 56.4 SEC (ref 24.7–36)
APTT PPP: 57.4 SEC (ref 24.7–36)
BASOPHILS # BLD AUTO: 0.8 % (ref 0–1.8)
BASOPHILS # BLD: 0.13 K/UL (ref 0–0.12)
BUN SERPL-MCNC: 19 MG/DL (ref 8–22)
CALCIUM SERPL-MCNC: 8.9 MG/DL (ref 8.5–10.5)
CHLORIDE SERPL-SCNC: 105 MMOL/L (ref 96–112)
CO2 SERPL-SCNC: 21 MMOL/L (ref 20–33)
CREAT SERPL-MCNC: 1.1 MG/DL (ref 0.5–1.4)
EOSINOPHIL # BLD AUTO: 0.01 K/UL (ref 0–0.51)
EOSINOPHIL NFR BLD: 0.1 % (ref 0–6.9)
ERYTHROCYTE [DISTWIDTH] IN BLOOD BY AUTOMATED COUNT: 62.8 FL (ref 35.9–50)
GFR SERPL CREATININE-BSD FRML MDRD: >60 ML/MIN/1.73 M 2
GLUCOSE BLD-MCNC: 102 MG/DL (ref 65–99)
GLUCOSE BLD-MCNC: 106 MG/DL (ref 65–99)
GLUCOSE BLD-MCNC: 118 MG/DL (ref 65–99)
GLUCOSE BLD-MCNC: 148 MG/DL (ref 65–99)
GLUCOSE BLD-MCNC: 158 MG/DL (ref 65–99)
GLUCOSE BLD-MCNC: 178 MG/DL (ref 65–99)
GLUCOSE BLD-MCNC: 230 MG/DL (ref 65–99)
GLUCOSE SERPL-MCNC: 146 MG/DL (ref 65–99)
GRAM STN SPEC: NORMAL
HCT VFR BLD AUTO: 50.7 % (ref 42–52)
HGB BLD-MCNC: 15.5 G/DL (ref 14–18)
IMM GRANULOCYTES # BLD AUTO: 0.18 K/UL (ref 0–0.11)
IMM GRANULOCYTES NFR BLD AUTO: 1.1 % (ref 0–0.9)
LACTATE BLD-SCNC: 1.8 MMOL/L (ref 0.5–2)
LYMPHOCYTES # BLD AUTO: 0.79 K/UL (ref 1–4.8)
LYMPHOCYTES NFR BLD: 4.9 % (ref 22–41)
MCH RBC QN AUTO: 24.8 PG (ref 27–33)
MCHC RBC AUTO-ENTMCNC: 30.6 G/DL (ref 33.7–35.3)
MCV RBC AUTO: 81.3 FL (ref 81.4–97.8)
MONOCYTES # BLD AUTO: 0.64 K/UL (ref 0–0.85)
MONOCYTES NFR BLD AUTO: 4 % (ref 0–13.4)
NEUTROPHILS # BLD AUTO: 14.45 K/UL (ref 1.82–7.42)
NEUTROPHILS NFR BLD: 89.1 % (ref 44–72)
NRBC # BLD AUTO: 0 K/UL
NRBC BLD AUTO-RTO: 0 /100 WBC
PLATELET # BLD AUTO: 351 K/UL (ref 164–446)
PMV BLD AUTO: 11.1 FL (ref 9–12.9)
POTASSIUM SERPL-SCNC: 4.1 MMOL/L (ref 3.6–5.5)
RBC # BLD AUTO: 6.24 M/UL (ref 4.7–6.1)
SIGNIFICANT IND 70042: NORMAL
SITE SITE: NORMAL
SODIUM SERPL-SCNC: 136 MMOL/L (ref 135–145)
SOURCE SOURCE: NORMAL
TROPONIN I SERPL-MCNC: 1.61 NG/ML (ref 0–0.04)
WBC # BLD AUTO: 16.2 K/UL (ref 4.8–10.8)

## 2017-11-20 PROCEDURE — 93970 EXTREMITY STUDY: CPT

## 2017-11-20 PROCEDURE — 94760 N-INVAS EAR/PLS OXIMETRY 1: CPT

## 2017-11-20 PROCEDURE — 700111 HCHG RX REV CODE 636 W/ 250 OVERRIDE (IP): Performed by: HOSPITALIST

## 2017-11-20 PROCEDURE — A9270 NON-COVERED ITEM OR SERVICE: HCPCS | Performed by: HOSPITALIST

## 2017-11-20 PROCEDURE — 36415 COLL VENOUS BLD VENIPUNCTURE: CPT

## 2017-11-20 PROCEDURE — 700102 HCHG RX REV CODE 250 W/ 637 OVERRIDE(OP): Performed by: HOSPITALIST

## 2017-11-20 PROCEDURE — 770020 HCHG ROOM/CARE - TELE (206)

## 2017-11-20 PROCEDURE — 85025 COMPLETE CBC W/AUTO DIFF WBC: CPT

## 2017-11-20 PROCEDURE — 85730 THROMBOPLASTIN TIME PARTIAL: CPT

## 2017-11-20 PROCEDURE — 94640 AIRWAY INHALATION TREATMENT: CPT

## 2017-11-20 PROCEDURE — 99233 SBSQ HOSP IP/OBS HIGH 50: CPT | Performed by: HOSPITALIST

## 2017-11-20 PROCEDURE — 700101 HCHG RX REV CODE 250: Performed by: HOSPITALIST

## 2017-11-20 PROCEDURE — 80048 BASIC METABOLIC PNL TOTAL CA: CPT

## 2017-11-20 PROCEDURE — 83605 ASSAY OF LACTIC ACID: CPT

## 2017-11-20 PROCEDURE — 82962 GLUCOSE BLOOD TEST: CPT | Mod: 91

## 2017-11-20 RX ORDER — PREDNISONE 20 MG/1
20 TABLET ORAL DAILY
Status: COMPLETED | OUTPATIENT
Start: 2017-11-20 | End: 2017-11-24

## 2017-11-20 RX ORDER — FUROSEMIDE 10 MG/ML
20 INJECTION INTRAMUSCULAR; INTRAVENOUS ONCE
Status: COMPLETED | OUTPATIENT
Start: 2017-11-20 | End: 2017-11-20

## 2017-11-20 RX ORDER — IPRATROPIUM BROMIDE AND ALBUTEROL SULFATE 2.5; .5 MG/3ML; MG/3ML
3 SOLUTION RESPIRATORY (INHALATION)
Status: DISCONTINUED | OUTPATIENT
Start: 2017-11-20 | End: 2017-11-21

## 2017-11-20 RX ORDER — ALBUTEROL SULFATE 90 UG/1
2 AEROSOL, METERED RESPIRATORY (INHALATION)
Status: DISCONTINUED | OUTPATIENT
Start: 2017-11-20 | End: 2017-11-23

## 2017-11-20 RX ORDER — HYDRALAZINE HYDROCHLORIDE 25 MG/1
25 TABLET, FILM COATED ORAL EVERY 8 HOURS
Status: DISCONTINUED | OUTPATIENT
Start: 2017-11-20 | End: 2017-11-24 | Stop reason: HOSPADM

## 2017-11-20 RX ADMIN — HEPARIN SODIUM 25000 UNITS: 5000 INJECTION, SOLUTION INTRAVENOUS at 11:39

## 2017-11-20 RX ADMIN — FUROSEMIDE 40 MG: 10 INJECTION, SOLUTION INTRAMUSCULAR; INTRAVENOUS at 08:27

## 2017-11-20 RX ADMIN — ATORVASTATIN CALCIUM 40 MG: 40 TABLET, FILM COATED ORAL at 21:23

## 2017-11-20 RX ADMIN — METOPROLOL TARTRATE 25 MG: 25 TABLET, FILM COATED ORAL at 21:23

## 2017-11-20 RX ADMIN — DOXYCYCLINE 100 MG: 100 TABLET ORAL at 08:26

## 2017-11-20 RX ADMIN — PREDNISONE 20 MG: 20 TABLET ORAL at 08:27

## 2017-11-20 RX ADMIN — ALBUTEROL SULFATE 2 PUFF: 90 AEROSOL, METERED RESPIRATORY (INHALATION) at 06:18

## 2017-11-20 RX ADMIN — ALBUTEROL SULFATE 2 PUFF: 90 AEROSOL, METERED RESPIRATORY (INHALATION) at 15:04

## 2017-11-20 RX ADMIN — HYDRALAZINE HYDROCHLORIDE 25 MG: 25 TABLET, FILM COATED ORAL at 21:23

## 2017-11-20 RX ADMIN — DOXYCYCLINE 100 MG: 100 TABLET ORAL at 21:23

## 2017-11-20 RX ADMIN — ALBUTEROL SULFATE 2 PUFF: 90 AEROSOL, METERED RESPIRATORY (INHALATION) at 22:36

## 2017-11-20 RX ADMIN — HEPARIN SODIUM 5000 UNITS: 1000 INJECTION, SOLUTION INTRAVENOUS; SUBCUTANEOUS at 12:43

## 2017-11-20 RX ADMIN — IPRATROPIUM BROMIDE AND ALBUTEROL SULFATE 3 ML: .5; 3 SOLUTION RESPIRATORY (INHALATION) at 10:53

## 2017-11-20 RX ADMIN — HYDRALAZINE HYDROCHLORIDE 25 MG: 25 TABLET, FILM COATED ORAL at 17:04

## 2017-11-20 RX ADMIN — METOPROLOL TARTRATE 25 MG: 25 TABLET, FILM COATED ORAL at 08:27

## 2017-11-20 RX ADMIN — POTASSIUM CHLORIDE 10 MEQ: 1500 TABLET, EXTENDED RELEASE ORAL at 08:26

## 2017-11-20 RX ADMIN — IPRATROPIUM BROMIDE AND ALBUTEROL SULFATE 3 ML: .5; 3 SOLUTION RESPIRATORY (INHALATION) at 19:30

## 2017-11-20 RX ADMIN — FUROSEMIDE 20 MG: 10 INJECTION, SOLUTION INTRAVENOUS at 04:33

## 2017-11-20 RX ADMIN — IPRATROPIUM BROMIDE AND ALBUTEROL SULFATE 3 ML: .5; 3 SOLUTION RESPIRATORY (INHALATION) at 23:45

## 2017-11-20 RX ADMIN — CEFTRIAXONE 2 G: 2 INJECTION, POWDER, FOR SOLUTION INTRAMUSCULAR; INTRAVENOUS at 08:27

## 2017-11-20 RX ADMIN — ASPIRIN 325 MG: 325 TABLET, COATED ORAL at 08:28

## 2017-11-20 ASSESSMENT — ENCOUNTER SYMPTOMS
CHILLS: 0
SHORTNESS OF BREATH: 1
PND: 0
NAUSEA: 0
FEVER: 0
DIZZINESS: 0
ORTHOPNEA: 1
VOMITING: 0
BACK PAIN: 0
DIARRHEA: 0
CLAUDICATION: 0
ABDOMINAL PAIN: 0
PALPITATIONS: 0
COUGH: 1
HEADACHES: 0
LOSS OF CONSCIOUSNESS: 0
COUGH: 0

## 2017-11-20 ASSESSMENT — PAIN SCALES - GENERAL
PAINLEVEL_OUTOF10: 0

## 2017-11-20 ASSESSMENT — LIFESTYLE VARIABLES: ALCOHOL_USE: NO

## 2017-11-20 NOTE — H&P
CHIEF COMPLAINT:  Cough and dyspnea.    HISTORY OF PRESENT ILLNESS:  The patient is a 51-year-old male with a history   of COPD and asthma who presented to the emergency room for evaluation of cough   and dyspnea for the past 4 days.  He denies any associated chest pain.  His   cough has been productive of yellow sputum.  It has been associated with fever   2 days ago and chills.  No hemoptysis.  No nausea, vomiting or diarrhea.  He   has a previous history of pneumonia and his symptoms were concerning for   recurrence of pneumonia as they were very similar.  He denies any loss of   consciousness.  He denies any palpitations.  He has chronic lower extremity   edema, which is unchanged.  He denies any diarrhea.  No recent travel or sick   contacts.    REVIEW OF SYSTEMS:  As above, otherwise reviewed and negative.    PAST MEDICAL HISTORY:  Significant for asthma/COPD.  He was also told that his   blood sugars have been elevated in the past, but not formally diagnosed with   diabetes per patient.    PAST SURGICAL HISTORY:  Abdominal surgery.    SOCIAL HISTORY:  He smokes half a pack per day.  No alcohol or illicit drug   use.    FAMILY HISTORY:  No family history of premature atherosclerosis.    HOME MEDICATIONS:  Albuterol as needed.    PHYSICAL EXAMINATION:  GENERAL:  He is alert and oriented x3.  He is morbidly obese.  VITAL SIGNS:  Temperature is 36.4, pulse is 90, respiratory rate is 14, blood   pressure is 150/90, pulse oximetry is 94%.  HEAD AND NECK:  Pupils equal.  Supple neck.  No jugular venous distention.    Oropharynx is clear.  No cervical lymphadenopathy.  HEART:  Regular rate and rhythm.  Normal S1, S2.  No murmurs, rubs or gallops.  LUNGS:  He has scattered rhonchi and expiratory wheezing.  He has symmetric   air entry bilaterally.  No chest wall tenderness.  ABDOMEN:  Soft, nontender.  Bowel sounds are positive.  No hepatosplenomegaly.  EXTREMITIES:  No edema, no clubbing, no cyanosis.  NEUROLOGIC:   No focal deficits.  SKIN:  No rash.    DIAGNOSTICS:  White blood cell count 16.1, hemoglobin 19.6, hematocrit 61.3,   platelet count 175.  Sodium is 136, potassium 4.5, chloride 105, bicarbonate   24, glucose 128, BUN 18, creatinine 1.31, alkaline phosphatase 123, AST 17,   ALT 13.  Lactic acid is 2.1, glycohemoglobin 16.9.  Troponin I is 2.5.  BNP is   930.    Chest x-ray reveals moderate pulmonary edema.  Echocardiogram reveals mild   concentric left ventricular hypertrophy, left ventricular ejection fraction is   visually estimated to be 35%, global hypokinesis with regional variation,   severely dilated right ventricle, reduced right ventricular systolic function,   mildly dilated left atrium, enlarged right atrium, possible low-grade   low-flow stenosis, mild mitral regurgitation, mild tricuspid regurgitation,   estimated right ventricular systolic pressure is 35 mmHg, mild pulmonary   insufficiency, normal pericardium without effusion, ascending aorta diameter   is 3.6 cm.    ASSESSMENT AND PLAN:  1.  Chronic obstructive pulmonary disease with acute exacerbation.  2.  Possible pneumonia.  3.  Acute pulmonary edema.  4.  Non-ST elevation myocardial infarction.  5.  Cardiomyopathy.  6.  Likely undiagnosed diabetes.  7.  Morbid obesity.    PLAN:  The patient will be admitted and monitored on telemetry.    He will be started on ceftriaxone and doxycycline.  He will be started on   prednisone.    We will start him on aspirin, atorvastatin, metoprolol and heparin drip.  He   was evaluated by Dr. Lorenzana and plan is to proceed with coronary angiography   for further evaluation.    The patient received a dose of Lasix in the emergency room.  We will continue   with diuresis with close monitoring of his renal function.  His renal function   remains stable.  We will consider adding ACE inhibitor given his reduced   ejection fraction.    Plan of care reviewed with the patient and his questions answered.    Patient will  likely require more than 2-midnight stay for treatment of his   medical condition.       ____________________________________     MD DIDI FUENTES / YOSVANY    DD:  11/19/2017 22:51:12  DT:  11/20/2017 04:21:29    D#:  [id number]  Job#:  [id number]

## 2017-11-20 NOTE — PROGRESS NOTES
Jossie Wolf Fall Risk Assessment:     Last Known Fall: No falls  Mobility: Dizziness/generalized weakness  Medications: Cardiovascular or central nervous system meds, Diuretics  Mental Status/LOC/Awareness: Awake, alert, and oriented to date, place, and person  Toileting Needs: Use of assistive device (Bedside commode, bedpan, urinal)  Volume/Electrolyte Status: Use of IV fluids/tube feeds  Communication/Sensory: No deficits  Behavior: Appropriate behavior  Jossie Wolf Fall Risk Total: 11  Fall Risk Level: MODERATE RISK    Universal Fall Precautions:  call light/belongings in reach, bed in low position and locked, wheelchairs and assistive devices out of sight, siderails up x 2, use non-slip footwear, adequate lighting, clutter free and spill free environment, educate on level of risk, educate to call for assistance    Fall Risk Level Interventions:    TRIAL (TELE 8, NEURO, MED KURT 5) Moderate Fall Risk Interventions  Place yellow fall risk ID band on patient: completed  Provide patient/family education based on risk assessment : completed  Educate patient/family to call staff for assistance when getting out of bed: completed  Place fall precaution signage outside patient door: completed  Utilize bed/chair fall alarm: refused     Patient Specific Interventions:     Medication: review medications with patient and family and provide patients who received diuretics/laxatives frequent toileting  Mental Status/LOC/Awareness: reinforce falls education and reinforce the use of call light  Toileting: monitor intake and output/use of appropriate interventions, instruct male patients prone to dizziness to void while sitting and instruct patient/family on the need to call for assistance when toileting  Volume/Electrolyte Status: ensure IV fluids are appropriate  Communication/Sensory: not applicable  Behavioral: not applicable  Mobility: dangle prior to standing, ensure bed is locked and in lowest position and provide  appropriate assistive device

## 2017-11-20 NOTE — PROGRESS NOTES
Jossie Wolf Fall Risk Assessment:     Last Known Fall: No falls  Mobility: Dizziness/generalized weakness  Medications: Cardiovascular or central nervous system meds, Diuretics  Mental Status/LOC/Awareness: Awake, alert, and oriented to date, place, and person  Toileting Needs: Use of assistive device (Bedside commode, bedpan, urinal)  Volume/Electrolyte Status: Use of IV fluids/tube feeds  Communication/Sensory: No deficits  Behavior: Appropriate behavior  Jossie Wolf Fall Risk Total: 11  Fall Risk Level: MODERATE RISK    Universal Fall Precautions:  call light/belongings in reach, bed in low position and locked, wheelchairs and assistive devices out of sight, use non-slip footwear, siderails up x 2, educate to call for assistance, clutter free and spill free environment, adequate lighting, educate on level of risk    Fall Risk Level Interventions:    TRIAL (TELE 8, NEURO, MED KURT 5) Moderate Fall Risk Interventions  Place yellow fall risk ID band on patient: verified  Provide patient/family education based on risk assessment : completed  Educate patient/family to call staff for assistance when getting out of bed: completed  Place fall precaution signage outside patient door: completed  Utilize bed/chair fall alarm: refused     Patient Specific Interventions:     Medication: review medications with patient and family and provide patients who received diuretics/laxatives frequent toileting  Mental Status/LOC/Awareness: reorient patient, reinforce falls education and reinforce the use of call light  Toileting: instruct male patients prone to dizziness to void while sitting, instruct patient/family on the use of grab bars and do not leave patient unattended in bathroom/refer to toileting scripting  Volume/Electrolyte Status: ensure patient remains hydrated, ensure IV fluids are appropriate and teach patients to dangle before rising if hypotensive  Communication/Sensory: update plan of care on whiteboard and  ensure proper positioning when transferrng/ambulating  Behavioral: encourage patient to voice feelings  Mobility: dangle prior to standing, instruct patient to exit bed on their strongest side and collaborate with doctor for possible PT/OT consult

## 2017-11-20 NOTE — FLOWSHEET NOTE
11/19/17 1608   Interdisciplinary Plan of Care-Goals (Indications)   Obstructive Ventilatory Defect or Pulmonary Disease without Obvious Obstruction History / Diagnosis   Interdisciplinary Plan of Care-Outcomes    Bronchodilator Outcome Improved Vital Signs and Measures of Gas Exchange   Education   Education Yes - Pt. / Family has been Instructed in use of Respiratory Equipment   RT Assessment of Delivered Medications   Evaluation of Medication Delivery Daily Yes-- Pt /Family has been Instructed in use of Respiratory Medications and Adverse Reactions   SVN Group   Given By: Mouthpiece   Date SVN Last Changed 11/19/17   Date SVN Next Change Due (Q 7 Days) 11/26/17   Continuous Nebulizer Group   Continuous Nebulizer Q 1 Hour Yes   #Bronchodilator Yes  (10 mg albuterol)   Respiratory WDL   Respiratory (WDL) X   Breath Sounds   RUL Breath Sounds Diminished   RML Breath Sounds Diminished   RLL Breath Sounds Diminished   GENNARO Breath Sounds Diminished   LLL Breath Sounds Diminished   Oximetry   Continuous Oximetry Yes   O2 Alarms Set & Reviewed Yes   Oxygen   Pulse Oximetry 93 %   O2 (LPM) 0   O2 (FiO2) 21   O2 Daily Delivery Respiratory  Room Air with O2 Available

## 2017-11-20 NOTE — WOUND TEAM
Seen by Wound Team, Wound Team Signing Off. Nursing to perform skin care per protocol. No further follow up by Wound Team unless consulted.

## 2017-11-20 NOTE — ED NOTES
Comfort check. Updated on poc. Pt states feeling less sob after breathing tx and lasix. Pt output 1200 urine documented

## 2017-11-20 NOTE — ASSESSMENT & PLAN NOTE
Improved exam and complaint  Likely cardiac induced bronchospasm  Prednisone 20mg po daily for 5 days  Cont BD's and O2 support

## 2017-11-20 NOTE — PROGRESS NOTES
Cardiology Progress Note               Author: Tushar Peterson Date & Time created: 2017  7:18 AM     Consultation for non-STEMI, troponin peaked at 2.5, abnormal EKG    Admitted with cough and dyspnea ×4 days    History of COPD, asthma, smoker half a pack a day, undiagnosed diabetes, morbid obesity    Labs reviewed  Sodium, potassium, creatinine stable  A1c 6.9  Troponin peaked at 2.5      BP =133/86  HR =72 nsr      Echocardiogram 17, LVEF 35%, global hypokinesis, severely dilated right ventricle, reduced RV systolic function, enlarged right atrium, possible low-grade low flow stenosis, RVSP 35 mmHg      Review of Systems   Respiratory: Positive for shortness of breath. Negative for cough.    Cardiovascular: Positive for orthopnea and leg swelling. Negative for chest pain, palpitations, claudication and PND.       Physical Exam   Constitutional: He is oriented to person, place, and time.   HENT:   Head: Normocephalic.   Eyes: Conjunctivae are normal.   Cardiovascular: Normal rate and regular rhythm.    Pulses:       Carotid pulses are 2+ on the right side, and 2+ on the left side.       Radial pulses are 2+ on the right side, and 2+ on the left side.   Pulmonary/Chest: He has wheezes.   Abdominal: Soft.   Musculoskeletal: He exhibits edema.   Neurological: He is alert and oriented to person, place, and time.   Skin: Skin is warm and dry.       Hemodynamics:  Temp (24hrs), Av.6 °C (97.8 °F), Min:36.2 °C (97.2 °F), Max:36.9 °C (98.4 °F)  Temperature: 36.2 °C (97.2 °F)  Pulse  Av.5  Min: 66  Max: 90Heart Rate (Monitored): 88  Blood Pressure: 133/86, NIBP: 142/88     Respiratory:    Respiration: 18, Pulse Oximetry: 90 %, O2 Daily Delivery Respiratory : Silicone Nasal Cannula     Given By:: Mouthpiece, #Bronchodilator: Yes (10 mg albuterol), Work Of Breathing / Effort: Mild  RUL Breath Sounds: Clear, RML Breath Sounds: Clear, RLL Breath Sounds: Diminished, GENNARO Breath Sounds: Clear, LLL  Breath Sounds: Diminished  Fluids:     Weight: (!) 157.2 kg (346 lb 9 oz)  GI/Nutrition:  Orders Placed This Encounter   Procedures   • Diet Order     Standing Status:   Standing     Number of Occurrences:   1     Order Specific Question:   Diet:     Answer:   Cardiac [6]     Order Specific Question:   Diet:     Answer:   Diabetic [3]     Lab Results:  Recent Labs      11/19/17   1550  11/20/17   0526   WBC  12.2*  16.2*   RBC  6.00  6.24*   HEMOGLOBIN  15.4  15.5   HEMATOCRIT  49.3  50.7   MCV  82.2  81.3*   MCH  25.7*  24.8*   MCHC  31.2*  30.6*   RDW  62.6*  62.8*   PLATELETCT  290  351   MPV  10.0  11.1     Recent Labs      11/19/17   1550  11/20/17   0302   SODIUM  136  136   POTASSIUM  4.5  4.1   CHLORIDE  105  105   CO2  24  21   GLUCOSE  128*  146*   BUN  18  19   CREATININE  1.31  1.10   CALCIUM  9.3  8.9     Recent Labs      11/19/17   2309  11/20/17   0526   APTT  33.3  57.4*     Recent Labs      11/19/17   1550   BNPBTYPENAT  930*     Recent Labs      11/19/17   1550  11/19/17   1829  11/19/17   2309   TROPONINI  2.51*  1.95*  1.61*   BNPBTYPENAT  930*   --    --              Medical Decision Making, by Problem:  Active Hospital Problems    Diagnosis   • NSTEMI (non-ST elevated myocardial infarction) (CMS-HCC) [I21.4]   • COPD exacerbation (CMS-HCC) [J44.1]   • Pneumonia [J18.9]   • Cardiomyopathy (CMS-HCC) [I42.9]   • Acute pulmonary edema (CMS-HCC) [J81.0]       Assessment/Plan:    No rhythm issues overnight, normal sinus rhythm with first-degree AV block    Cardiomyopathy, LVEF 35% (no other echo available to compare with), plan for cath when respiratory status stabilizes, unable to stay flat more than few seconds this morning    Acute decompensated systolic and diastolic heart failure, on furosemide 40 IV daily with good duiresis and patient feels breathing better this am    COPD/pneumonia on Rocephin ,doxycycline, prednisone 40 mg ,     Non-STEMI on aspirin 325, Lipitor 40, metoprolol 25 BID,  currently on heparin drip, no angina        I/O = -3900×24 hours    Plan for left heart catheter when respiratory status stable    Consider adding ace inhibitor         Reviewed items::  Medications reviewed and Labs reviewed

## 2017-11-20 NOTE — PROGRESS NOTES
Monitor Summary: SR with AVB, HR: 69 - 88, TATIANA: 0.24, QRS: 0.10, QTI: 0.40; frequent PACs, Rare PVCs

## 2017-11-20 NOTE — RESPIRATORY CARE
COPD EDUCATION by COPD CLINICAL EDUCATOR  11/20/2017  at  9:01 AM by Gila Patten     Patient interviewed by COPD education team.  Patient unable to participate in full program.  Short intervention has been conducted.  A comprehensive packet including information about COPD, treatments, and smoking cessation given.

## 2017-11-20 NOTE — CONSULTS
Cardiology Consult Note:    Tay Guajardo  Date of Service:    11/19/2017   4:57 PM     Referring MD:  Dr. Sorensen/ER    Patient ID:   Name:             Joshua Medrano   YOB: 1965  Age:                 51 y.o.  male   MRN:               [id number]                                                             Chief Complaint:      Elevated Troponin    History of Present Illness:    50 y/o came to ER for dyspnea, asthma attack x 4 days found to have Elevated Troponin and abnormal EKG with decreased anterior forces but no acute abnormality  H/o WT loss from 381 to 330 in 1 yr, HTN but denies if DM, HLD, prior MI, sleep apnea  Current smoker and wanting to quit after 15 yrs;       Review of Systems:      Constitutional: Denies fevers, Denies weight changes  Eyes: Denies changes in vision, no eye pain  Ears/Nose/Throat/Mouth: Denies nasal congestion or sore throat   Cardiovascular: - chest pain, - palpitations   Respiratory: + shortness of breath , Denies cough  Gastrointestinal/Hepatic: Denies abdominal pain, nausea, vomiting, diarrhea, constipation or GI bleeding   Genitourinary: Denies dysuria or frequency  Musculoskeletal/Rheum: Denies  joint pain and swelling   Skin: Denies rash  Neurological: Denies headache, confusion, memory loss or focal weakness/parasthesias  Psychiatric: denies mood disorder   Endocrine: Ina thyroid problems  Heme/Oncology/Lymph Nodes: Denies enlarged lymph nodes, denies brusing or known bleeding disorder  All other systems were reviewed and are negative (AMA/CMS criteria)                Past Medical History:   Past Medical History:   Diagnosis Date   • ASTHMA    • Chronic obstructive pulmonary disease (CMS-HCC)    • Diabetes      Active Hospital Problems    Diagnosis   • CVA (cerebral vascular accident) (CMS-HCC) [I63.9]       Past Surgical History:  Past Surgical History:   Procedure Laterality Date   • OTHER ABDOMINAL SURGERY         Hospital Medications:    Current  "Facility-Administered Medications:   •  predniSONE (DELTASONE) tablet 60 mg, 60 mg, Oral, Once, Dheeraj Sorensen M.D.  •  furosemide (LASIX) injection 40 mg, 40 mg, Intravenous, Once, Dheeraj Sorensen M.D.  No current outpatient prescriptions on file.    Current Outpatient Medications:    (Not in a hospital admission)    Medication Allergy:  Allergies   Allergen Reactions   • Cillins [Penicillins] Rash     As a child   • Erythromycin Hives       Family History:  No family history of premature CAD.    Social History:  Social History     Social History   • Marital status:      Spouse name: N/A   • Number of children: N/A   • Years of education: N/A     Occupational History   • Not on file.     Social History Main Topics   • Smoking status: Current Every Day Smoker     Packs/day: 0.50     Years: 20.00     Types: Cigarettes   • Smokeless tobacco: Never Used   • Alcohol use No   • Drug use: No   • Sexual activity: Not on file     Other Topics Concern   • Not on file     Social History Narrative   • No narrative on file         Physical Exam:  Vitals  Weight/BMI: Body mass index is 41.57 kg/m².  Blood pressure 145/95, pulse 90, temperature 36.4 °C (97.6 °F), temperature source Temporal, resp. rate 18, height 1.956 m (6' 5\"), weight (!) 159 kg (350 lb 8.5 oz), SpO2 93 %.  Vitals:    11/19/17 1443 11/19/17 1459 11/19/17 1608   BP: 145/95     Pulse: 90     Resp: 18     Temp: 36.4 °C (97.6 °F)     TempSrc: Temporal     SpO2: 97%  93%   Weight:  (!) 159 kg (350 lb 8.5 oz)    Height: 1.956 m (6' 5\")       Oxygen Therapy:  Pulse Oximetry: 93 %, O2 (LPM): 0  General Appearance:  MO;  Well developed, Well nourished, No acute distress, Non-toxic appearance.   HENT:  Normocephalic, Atraumatic, Oropharynx moist mucous membranes, Dentition: , Nose normal.    Eyes:  PERRLA, EOMI, Conjunctiva normal, No discharge.  Neck:  Normal range of motion, No cervical tenderness, Supple, No stridor, no JVD .  No thyromegaly.  No carotid " bruit.  Cardiovascular:  Normal heart rate, Normal rhythm,  S1, S2, no S3,  S4; No gallops; No murmurs, No rubs, .   Extremitites with intact distal pulses, no cyanosis, clubbing or edema.  No heaves, thrills, HJR;  Peripheral pulses: carotid 2+, brachial 2+, radial 2+, ulnar 2+, femoral 2+, popliteal 2+, PT 2+, DP 2+;  Lungs:  Respiratory effort is normal. Normal breath sounds, breath sounds clear to auscultation bilaterally,  no rales, no rhonchi, no wheezing.   Abdomen: Bowel sounds normal, Soft, No tenderness, No guarding, No rebound, No masses, No hepatosplenomegaly.  Skin: Warm, Dry, No erythema, No rash, no induration or crepitus.  Neurologic: Alert & oriented x 3, Normal motor function, Normal sensory function, No focal deficits noted, cranial nerves II through XII are normal, .  Psychiatric: Affect normal, Judgment normal, Mood normal.      MDM (Data Review):     Records reviewed and summarized in current documentation    Lab Data Review:  Recent Results (from the past 24 hour(s))   CBC w/ Differential    Collection Time: 11/19/17  3:50 PM   Result Value Ref Range    WBC 12.2 (H) 4.8 - 10.8 K/uL    RBC 6.00 4.70 - 6.10 M/uL    Hemoglobin 15.4 14.0 - 18.0 g/dL    Hematocrit 49.3 42.0 - 52.0 %    MCV 82.2 81.4 - 97.8 fL    MCH 25.7 (L) 27.0 - 33.0 pg    MCHC 31.2 (L) 33.7 - 35.3 g/dL    RDW 62.6 (H) 35.9 - 50.0 fL    Platelet Count 290 164 - 446 K/uL    MPV 10.0 9.0 - 12.9 fL    Neutrophils-Polys 83.10 (H) 44.00 - 72.00 %    Lymphocytes 6.60 (L) 22.00 - 41.00 %    Monocytes 5.50 0.00 - 13.40 %    Eosinophils 2.50 0.00 - 6.90 %    Basophils 1.20 0.00 - 1.80 %    Immature Granulocytes 1.10 (H) 0.00 - 0.90 %    Nucleated RBC 0.00 /100 WBC    Neutrophils (Absolute) 10.11 (H) 1.82 - 7.42 K/uL    Lymphs (Absolute) 0.80 (L) 1.00 - 4.80 K/uL    Monos (Absolute) 0.67 0.00 - 0.85 K/uL    Eos (Absolute) 0.30 0.00 - 0.51 K/uL    Baso (Absolute) 0.15 (H) 0.00 - 0.12 K/uL    Immature Granulocytes (abs) 0.13 (H) 0.00 - 0.11  K/uL    NRBC (Absolute) 0.00 K/uL   Complete Metabolic Panel (CMP)    Collection Time: 11/19/17  3:50 PM   Result Value Ref Range    Sodium 136 135 - 145 mmol/L    Potassium 4.5 3.6 - 5.5 mmol/L    Chloride 105 96 - 112 mmol/L    Co2 24 20 - 33 mmol/L    Anion Gap 7.0 0.0 - 11.9    Glucose 128 (H) 65 - 99 mg/dL    Bun 18 8 - 22 mg/dL    Creatinine 1.31 0.50 - 1.40 mg/dL    Calcium 9.3 8.5 - 10.5 mg/dL    AST(SGOT) 17 12 - 45 U/L    ALT(SGPT) 13 2 - 50 U/L    Alkaline Phosphatase 123 (H) 30 - 99 U/L    Total Bilirubin 1.1 0.1 - 1.5 mg/dL    Albumin 3.4 3.2 - 4.9 g/dL    Total Protein 7.3 6.0 - 8.2 g/dL    Globulin 3.9 (H) 1.9 - 3.5 g/dL    A-G Ratio 0.9 g/dL   Btype Natriuretic Peptide    Collection Time: 11/19/17  3:50 PM   Result Value Ref Range    B Natriuretic Peptide 930 (H) 0 - 100 pg/mL   Troponin STAT    Collection Time: 11/19/17  3:50 PM   Result Value Ref Range    Troponin I 2.51 (H) 0.00 - 0.04 ng/mL   LACTIC ACID    Collection Time: 11/19/17  3:50 PM   Result Value Ref Range    Lactic Acid 2.1 (H) 0.5 - 2.0 mmol/L   Hemoglobin A1C    Collection Time: 11/19/17  3:50 PM   Result Value Ref Range    Glycohemoglobin 6.9 (H) 0.0 - 5.6 %    Est Avg Glucose 151 mg/dL   ESTIMATED GFR    Collection Time: 11/19/17  3:50 PM   Result Value Ref Range    GFR If African American >60 >60 mL/min/1.73 m 2    GFR If Non  57 (A) >60 mL/min/1.73 m 2       Imaging/Procedures Review:    Chest Xray:  Reviewed    EKG:   As in HPI. SR lack of R waves all precordial leads    MDM (Assessment and Plan):     Active Hospital Problems    Diagnosis   • CVA (cerebral vascular accident) (CMS-HCC) [I63.9]     Dyspnea  Abn EKG  Elevated Troponin  Morbid obesity  DM  Aortic stenosis  CM LVEF with regonal wall motion [id number]%    Rec: STAT Echo  Trend Trop and EKG  ASA, UFH  Case discussed with attending/RN  Will follow  Thx    ADDN: STAT Echo in ER:  TDS - Body Habitus  No prior study is available for comparison.   Normal  left ventricular chamber size.  Mild concentric left ventricular hypertrophy.  Left ventricular ejection fraction is visually estimated to be 35%.  Global hypokinesis with regional variation.  Severely dilated right ventricle.  Reduced right ventricular systolic function.  Mildly dilated left atrium.  Enlarged right atrium.  Possible low grade, low flow stenosis.  Mild mitral regurgitation.  Mild tricuspid regurgitation.  Estimated right ventricular systolic pressure  is 35 mmHg.  Mild pulmonic insufficiency.  Normal pericardium without effusion.  Ascending aorta diameter is 3.6 cm.      Rec: LHC and further eval low flow aortic stenosis severity

## 2017-11-20 NOTE — CARE PLAN
Problem: Nutritional:  Goal: Patient to verbalize or demonstrate understanding of diet  Provided pt and wife with diabetic diet education.

## 2017-11-20 NOTE — PROGRESS NOTES
Pt dyspneic at this time, oxygen saturation 91% on 2L oxygen via NC, pt sitting on side of bed with labored breathing trying to 'catch his breath,' lung sounds clear/no wheezing/no ronchi, diminished at bases, RN spoke with Dr. Martinez via phone about pt status, received order to give another dose of lasix

## 2017-11-20 NOTE — PROGRESS NOTES
Two RN skin check with [facility]: generalized blanching redness over body; discolored red/micheal lower extremities; dry flaky heels; small open area of skin on right shin, bandage applied and wound care consulted

## 2017-11-20 NOTE — PROGRESS NOTES
[facility] Hospitalist Progress Note    Date of Service: 2017    Chief Complaint  51 y.o. male admitted 2017 with 3 days of SOB and L leg edema.  On admission CXR showing pulmonary edema, Trops elevated (NSTEMI), thought to be having AECOPD/CAP as well    Interval Problem Update  Feeling better this am.  Less SOB.  R leg leg swollen.  Min cough. No CP.  No other complaints other then feeling hungry    Consultants/Specialty  Cardiology    Disposition  Cont Tele, plan [facility] tomorrow.  Taper steroids, cont lasix diuresis, consult diabetic educator        Review of Systems   Constitutional: Negative for chills and fever.   Respiratory: Positive for cough and shortness of breath.    Cardiovascular: Positive for leg swelling. Negative for chest pain.   Gastrointestinal: Negative for abdominal pain, diarrhea, nausea and vomiting.   Musculoskeletal: Negative for back pain.   Skin: Negative for rash.   Neurological: Negative for dizziness, loss of consciousness and headaches.      Physical Exam  Laboratory/Imaging   Hemodynamics  Temp (24hrs), Av.6 °C (97.8 °F), Min:36.2 °C (97.2 °F), Max:36.9 °C (98.4 °F)   Temperature: 36.2 °C (97.2 °F)  Pulse  Av.5  Min: 66  Max: 90 Heart Rate (Monitored): 88  Blood Pressure: 133/86, NIBP: 142/88      Respiratory      Respiration: 18, Pulse Oximetry: 90 %, O2 Daily Delivery Respiratory : Silicone Nasal Cannula     Given By:: Mouthpiece, #Bronchodilator: Yes (10 mg albuterol), Work Of Breathing / Effort: Mild  RUL Breath Sounds: Clear, RML Breath Sounds: Clear, RLL Breath Sounds: Diminished, GENNARO Breath Sounds: Clear, LLL Breath Sounds: Diminished    Fluids    Intake/Output Summary (Last 24 hours) at 17 0752  Last data filed at 17 0600   Gross per 24 hour   Intake          1053.65 ml   Output             5000 ml   Net         -3946.35 ml       Nutrition  Orders Placed This Encounter   Procedures   • DIET ORDER     Standing Status:   Standing     Number of  Occurrences:   1     Order Specific Question:   Diet:     Answer:   Cardiac [6]     Order Specific Question:   Diet:     Answer:   Diabetic [3]     Physical Exam   Constitutional: He is oriented to person, place, and time. He appears well-developed and well-nourished. No distress.   HENT:   Head: Normocephalic and atraumatic.   Eyes: Conjunctivae are normal.   Neck: No JVD present.   Cardiovascular: Normal rate.  Exam reveals no gallop.    No murmur heard.  Pulmonary/Chest: Effort normal. No stridor. No respiratory distress. He has no wheezes. He has rales.   Abdominal: Soft. There is no tenderness. There is no rebound and no guarding.   Musculoskeletal: He exhibits edema.   R>L   Neurological: He is oriented to person, place, and time.   Skin: Skin is warm and dry. No rash noted. He is not diaphoretic.   Psychiatric: He has a normal mood and affect. Thought content normal.   Nursing note and vitals reviewed.      Recent Labs      11/19/17   1550  11/20/17   0526   WBC  12.2*  16.2*   RBC  6.00  6.24*   HEMOGLOBIN  15.4  15.5   HEMATOCRIT  49.3  50.7   MCV  82.2  81.3*   MCH  25.7*  24.8*   MCHC  31.2*  30.6*   RDW  62.6*  62.8*   PLATELETCT  290  351   MPV  10.0  11.1     Recent Labs      11/19/17   1550  11/20/17   0302   SODIUM  136  136   POTASSIUM  4.5  4.1   CHLORIDE  105  105   CO2  24  21   GLUCOSE  128*  146*   BUN  18  19   CREATININE  1.31  1.10   CALCIUM  9.3  8.9     Recent Labs      11/19/17   2309  11/20/17   0526   APTT  33.3  57.4*     Recent Labs      11/19/17   1550   BNPBTYPENAT  930*              Assessment/Plan     Type 2 diabetes mellitus (CMS-HCC)   Assessment & Plan    New Dx  Diabetic educator consult  Diabetic diet  Cont SSI for now  A1c 6.9  Hope to dc on po meds but will hold for now given pending cath        Acute pulmonary edema (CMS-HCC)   Assessment & Plan    Treat as noted  Follow daily BMP and I&Os        Cardiomyopathy (CMS-[facility])   Assessment & Plan    ?ischemic  Cards  following  LHC planned  On BB, statin, lasix, ASA  Neg fluid balance  Exam improved  Pt feeling better        Pneumonia   Assessment & Plan    Doxy        COPD exacerbation (CMS-HCC)   Assessment & Plan    Improved exam and complaint  Likely cardiac induced bronchospasm  Rapid taper of steroids  Cont BD's and O2 support        NSTEMI (non-ST elevated myocardial infarction) (CMS-HCC)   Assessment & Plan    Cards following  Plan [facility]  Cont statin, BB, ASA  Pt is CP free at present            Reviewed items::  EKG reviewed, Radiology images reviewed, Labs reviewed and Medications reviewed  Ceballos catheter::  No Ceballos  DVT prophylaxis pharmacological::  Heparin  DVT prophylaxis - mechanical:  SCDs  Ulcer Prophylaxis::  Not indicated  Antibiotics:  Treating active infection/contamination beyond 24 hours perioperative coverage

## 2017-11-20 NOTE — ASSESSMENT & PLAN NOTE
New Dx  Diabetic educator consult  Diabetic diet  Cont SSI for now  A1c 6.9  Hope to dc on po meds but will hold for now given pending cath

## 2017-11-21 LAB
ANION GAP SERPL CALC-SCNC: 10 MMOL/L (ref 0–11.9)
APTT PPP: 43.9 SEC (ref 24.7–36)
APTT PPP: 54.8 SEC (ref 24.7–36)
APTT PPP: 86.6 SEC (ref 24.7–36)
BUN SERPL-MCNC: 23 MG/DL (ref 8–22)
CALCIUM SERPL-MCNC: 9.2 MG/DL (ref 8.5–10.5)
CHLORIDE SERPL-SCNC: 101 MMOL/L (ref 96–112)
CO2 SERPL-SCNC: 26 MMOL/L (ref 20–33)
CREAT SERPL-MCNC: 1.24 MG/DL (ref 0.5–1.4)
GFR SERPL CREATININE-BSD FRML MDRD: >60 ML/MIN/1.73 M 2
GLUCOSE BLD-MCNC: 111 MG/DL (ref 65–99)
GLUCOSE BLD-MCNC: 123 MG/DL (ref 65–99)
GLUCOSE BLD-MCNC: 169 MG/DL (ref 65–99)
GLUCOSE BLD-MCNC: 170 MG/DL (ref 65–99)
GLUCOSE SERPL-MCNC: 98 MG/DL (ref 65–99)
POTASSIUM SERPL-SCNC: 4.1 MMOL/L (ref 3.6–5.5)
PROCALCITONIN SERPL-MCNC: 0.38 NG/ML
SODIUM SERPL-SCNC: 137 MMOL/L (ref 135–145)

## 2017-11-21 PROCEDURE — 700101 HCHG RX REV CODE 250: Performed by: HOSPITALIST

## 2017-11-21 PROCEDURE — 770020 HCHG ROOM/CARE - TELE (206)

## 2017-11-21 PROCEDURE — 99233 SBSQ HOSP IP/OBS HIGH 50: CPT | Performed by: HOSPITALIST

## 2017-11-21 PROCEDURE — 700111 HCHG RX REV CODE 636 W/ 250 OVERRIDE (IP): Performed by: HOSPITALIST

## 2017-11-21 PROCEDURE — 700102 HCHG RX REV CODE 250 W/ 637 OVERRIDE(OP): Performed by: HOSPITALIST

## 2017-11-21 PROCEDURE — 85730 THROMBOPLASTIN TIME PARTIAL: CPT

## 2017-11-21 PROCEDURE — 700111 HCHG RX REV CODE 636 W/ 250 OVERRIDE (IP): Performed by: NURSE PRACTITIONER

## 2017-11-21 PROCEDURE — A9270 NON-COVERED ITEM OR SERVICE: HCPCS | Performed by: HOSPITALIST

## 2017-11-21 PROCEDURE — 82962 GLUCOSE BLOOD TEST: CPT | Mod: 91

## 2017-11-21 PROCEDURE — 36415 COLL VENOUS BLD VENIPUNCTURE: CPT

## 2017-11-21 PROCEDURE — 94760 N-INVAS EAR/PLS OXIMETRY 1: CPT

## 2017-11-21 PROCEDURE — 84145 PROCALCITONIN (PCT): CPT

## 2017-11-21 PROCEDURE — 80048 BASIC METABOLIC PNL TOTAL CA: CPT

## 2017-11-21 PROCEDURE — 94640 AIRWAY INHALATION TREATMENT: CPT

## 2017-11-21 RX ORDER — ENALAPRIL MALEATE 2.5 MG/1
5 TABLET ORAL TWICE DAILY
Status: DISCONTINUED | OUTPATIENT
Start: 2017-11-21 | End: 2017-11-21

## 2017-11-21 RX ORDER — LISINOPRIL 5 MG/1
10 TABLET ORAL
Status: DISCONTINUED | OUTPATIENT
Start: 2017-11-22 | End: 2017-11-24

## 2017-11-21 RX ORDER — FUROSEMIDE 10 MG/ML
40 INJECTION INTRAMUSCULAR; INTRAVENOUS ONCE
Status: COMPLETED | OUTPATIENT
Start: 2017-11-21 | End: 2017-11-21

## 2017-11-21 RX ORDER — IPRATROPIUM BROMIDE AND ALBUTEROL SULFATE 2.5; .5 MG/3ML; MG/3ML
3 SOLUTION RESPIRATORY (INHALATION)
Status: DISCONTINUED | OUTPATIENT
Start: 2017-11-21 | End: 2017-11-23

## 2017-11-21 RX ADMIN — IPRATROPIUM BROMIDE AND ALBUTEROL SULFATE 3 ML: .5; 3 SOLUTION RESPIRATORY (INHALATION) at 03:32

## 2017-11-21 RX ADMIN — IPRATROPIUM BROMIDE AND ALBUTEROL SULFATE 3 ML: .5; 3 SOLUTION RESPIRATORY (INHALATION) at 10:47

## 2017-11-21 RX ADMIN — METOPROLOL TARTRATE 25 MG: 25 TABLET, FILM COATED ORAL at 08:22

## 2017-11-21 RX ADMIN — FUROSEMIDE 40 MG: 10 INJECTION, SOLUTION INTRAMUSCULAR; INTRAVENOUS at 13:49

## 2017-11-21 RX ADMIN — DOXYCYCLINE 100 MG: 100 TABLET ORAL at 20:48

## 2017-11-21 RX ADMIN — PREDNISONE 20 MG: 20 TABLET ORAL at 08:22

## 2017-11-21 RX ADMIN — HYDRALAZINE HYDROCHLORIDE 25 MG: 25 TABLET, FILM COATED ORAL at 06:09

## 2017-11-21 RX ADMIN — METOPROLOL TARTRATE 25 MG: 25 TABLET, FILM COATED ORAL at 20:49

## 2017-11-21 RX ADMIN — ASPIRIN 325 MG: 325 TABLET, COATED ORAL at 08:22

## 2017-11-21 RX ADMIN — INSULIN HUMAN 2 UNITS: 100 INJECTION, SOLUTION PARENTERAL at 17:11

## 2017-11-21 RX ADMIN — ENALAPRIL MALEATE 5 MG: 2.5 TABLET ORAL at 10:39

## 2017-11-21 RX ADMIN — HEPARIN SODIUM 2550 UNITS/HR: 5000 INJECTION, SOLUTION INTRAVENOUS at 20:51

## 2017-11-21 RX ADMIN — ATORVASTATIN CALCIUM 40 MG: 40 TABLET, FILM COATED ORAL at 20:49

## 2017-11-21 RX ADMIN — INSULIN HUMAN 2 UNITS: 100 INJECTION, SOLUTION PARENTERAL at 12:53

## 2017-11-21 RX ADMIN — HEPARIN SODIUM 25000 UNITS: 5000 INJECTION, SOLUTION INTRAVENOUS at 10:41

## 2017-11-21 RX ADMIN — ALBUTEROL SULFATE 2 PUFF: 90 AEROSOL, METERED RESPIRATORY (INHALATION) at 13:47

## 2017-11-21 RX ADMIN — FUROSEMIDE 40 MG: 10 INJECTION, SOLUTION INTRAMUSCULAR; INTRAVENOUS at 08:21

## 2017-11-21 RX ADMIN — HYDRALAZINE HYDROCHLORIDE 25 MG: 25 TABLET, FILM COATED ORAL at 13:49

## 2017-11-21 RX ADMIN — IPRATROPIUM BROMIDE AND ALBUTEROL SULFATE 3 ML: .5; 3 SOLUTION RESPIRATORY (INHALATION) at 07:36

## 2017-11-21 RX ADMIN — POTASSIUM CHLORIDE 10 MEQ: 1500 TABLET, EXTENDED RELEASE ORAL at 08:22

## 2017-11-21 RX ADMIN — HEPARIN SODIUM 2550 UNITS/HR: 5000 INJECTION, SOLUTION INTRAVENOUS at 01:28

## 2017-11-21 RX ADMIN — DOXYCYCLINE 100 MG: 100 TABLET ORAL at 08:21

## 2017-11-21 RX ADMIN — IPRATROPIUM BROMIDE AND ALBUTEROL SULFATE 3 ML: .5; 3 SOLUTION RESPIRATORY (INHALATION) at 20:02

## 2017-11-21 RX ADMIN — ALBUTEROL SULFATE 2 PUFF: 90 AEROSOL, METERED RESPIRATORY (INHALATION) at 18:47

## 2017-11-21 RX ADMIN — HEPARIN SODIUM 2150 UNITS/HR: 5000 INJECTION, SOLUTION INTRAVENOUS at 01:14

## 2017-11-21 RX ADMIN — HYDRALAZINE HYDROCHLORIDE 25 MG: 25 TABLET, FILM COATED ORAL at 20:49

## 2017-11-21 RX ADMIN — HEPARIN SODIUM 5000 UNITS: 1000 INJECTION, SOLUTION INTRAVENOUS; SUBCUTANEOUS at 01:27

## 2017-11-21 ASSESSMENT — ENCOUNTER SYMPTOMS
DIARRHEA: 0
ABDOMINAL PAIN: 0
FEVER: 0
NAUSEA: 0
PND: 0
PALPITATIONS: 0
SHORTNESS OF BREATH: 1
COUGH: 1
CHILLS: 0
DIZZINESS: 0
ORTHOPNEA: 1
LOSS OF CONSCIOUSNESS: 0
CLAUDICATION: 0
BACK PAIN: 0
COUGH: 0
VOMITING: 0
HEADACHES: 0

## 2017-11-21 ASSESSMENT — COPD QUESTIONNAIRES
COPD SCREENING SCORE: 3
DURING THE PAST 4 WEEKS HOW MUCH DID YOU FEEL SHORT OF BREATH: NONE/LITTLE OF THE TIME
DO YOU EVER COUGH UP ANY MUCUS OR PHLEGM?: NO/ONLY WITH OCCASIONAL COLDS OR INFECTIONS
HAVE YOU SMOKED AT LEAST 100 CIGARETTES IN YOUR ENTIRE LIFE: YES

## 2017-11-21 ASSESSMENT — PAIN SCALES - GENERAL
PAINLEVEL_OUTOF10: 0

## 2017-11-21 NOTE — CARE PLAN
Problem: Communication  Goal: The ability to communicate needs accurately and effectively will improve  Outcome: PROGRESSING AS EXPECTED  POC discussed with pt. at bedside. All questions and concerns have been addressed at this time. Verbal understanding recieved    Problem: Safety  Goal: Will remain free from falls  Outcome: PROGRESSING AS EXPECTED  Jossie Wolf Fall Risk Assessment:     Last Known Fall: No falls  Mobility: Dizziness/generalized weakness  Medications: Diuretics, Cardiovascular or central nervous system meds  Mental Status/LOC/Awareness: Awake, alert, and oriented to date, place, and person  Toileting Needs: Use of assistive device (Bedside commode, bedpan, urinal)  Volume/Electrolyte Status: Use of IV fluids/tube feeds  Communication/Sensory: No deficits  Behavior: Appropriate behavior  Jossie Wolf Fall Risk Total: 11  Fall Risk Level: MODERATE RISK    Universal Fall Precautions:  call light/belongings in reach, bed in low position and locked, siderails up x 2, adequate lighting, educate on level of risk, educate to call for assistance    Fall Risk Level Interventions:    TRIAL (TELE 8, NEURO, MED KURT 5) Moderate Fall Risk Interventions  Place yellow fall risk ID band on patient: verified  Provide patient/family education based on risk assessment : completed  Educate patient/family to call staff for assistance when getting out of bed: completed  Place fall precaution signage outside patient door: verified  Utilize bed/chair fall alarm: refused     Patient Specific Interventions:     Medication: review medications with patient and family  Mental Status/LOC/Awareness: reinforce falls education, check on patient hourly and reinforce the use of call light  Toileting: monitor intake and output/use of appropriate interventions  Volume/Electrolyte Status: ensure patient remains hydrated and monitor abnormal lab values  Communication/Sensory: update plan of care on whiteboard  Behavioral: engage  patient in daily activities and administer medication as ordered  Mobility: utilize bed/chair fall alarm, ensure bed is locked and in lowest position and provide appropriate assistive device

## 2017-11-21 NOTE — PROGRESS NOTES
Jossie Wolf Fall Risk Assessment:     Last Known Fall: No falls  Mobility: Dizziness/generalized weakness  Medications: Diuretics, Cardiovascular or central nervous system meds  Mental Status/LOC/Awareness: Awake, alert, and oriented to date, place, and person  Toileting Needs: Use of assistive device (Bedside commode, bedpan, urinal)  Volume/Electrolyte Status: Use of IV fluids/tube feeds  Communication/Sensory: No deficits  Behavior: Appropriate behavior  Jossie Wolf Fall Risk Total: 11  Fall Risk Level: MODERATE RISK    Universal Fall Precautions:  call light/belongings in reach, bed in low position and locked, siderails up x 2, wheelchairs and assistive devices out of sight, use non-slip footwear, adequate lighting, clutter free and spill free environment, educate on level of risk, educate to call for assistance    Fall Risk Level Interventions:    TRIAL (TELE 8, NEURO, MED KURT 5) Moderate Fall Risk Interventions  Place yellow fall risk ID band on patient: verified  Provide patient/family education based on risk assessment : completed  Educate patient/family to call staff for assistance when getting out of bed: completed  Place fall precaution signage outside patient door: verified  Utilize bed/chair fall alarm: refused      Patient Specific Interventions:     Medication: review medications with patient and family  Mental Status/LOC/Awareness: reinforce falls education and reinforce the use of call light  Toileting: instruct male patients prone to dizziness to void while sitting and instruct patient/family on the use of grab bars  Volume/Electrolyte Status: ensure patient remains hydrated and ensure IV fluids are appropriate  Communication/Sensory: update plan of care on whiteboard  Behavioral: encourage patient to voice feelings and engage patient in daily activities  Mobility: dangle prior to standing and instruct patient to exit bed on their strongest side

## 2017-11-21 NOTE — CONSULTS
"Diabetes education: Met with Joshua, his wife and daughter regarding diabetes education. Per wife, patient was on Actos in 2003, but never dx with diabetes. Blood sugars \"stabilized\" and Actos was discontinued.  Discussed what diabetes was, type two diabetes, how insulin works, what effects blood sugars, need to eat three meals and hs snack with carbohydrates and proteins and what they were. Pt is unable to exercise. Discussed smoking cessation ( pt vs family with strong nicotine smell), as well as how it effects his health (diabetes, increased risk for CV disease etc). Discussed finger sticks and goals for blood sugars as well as Hg A1c ( his was 6.9%).  Pt is currently on only regular sliding scale ac and hs and has not needed any insulin. Current blood sugars are : 106, 148,  And 118.  Plan: Pt wanted handouts tomorrow. CDE will bring meter as well. Please call [phone] if needs change.  "

## 2017-11-21 NOTE — PROGRESS NOTES
Assumed care of pt. at 1900    Bedside report received from MIN Alas   POC discussed with pt. At bedside and Pt. verbalizes understanding.  Pt. Is Awake and AOx 4 , on heparin gtt, running SR 79 on the monitor  Call light within reach  with appropriate instructions to call for assistance  Bed locked and in lowest position.

## 2017-11-21 NOTE — PROGRESS NOTES
Diabetes education: Met with patient and family this afternoon. Please see consult note.  Plan: Pt wanted handouts tomorrow. CDE will bring meter as well. Please call [phone] if needs change.

## 2017-11-21 NOTE — PROGRESS NOTES
Cardiology Progress Note               Author: Lenore Steele Date & Time created: 2017  8:14 AM     51 years old male present with cough and dyspnea x4. History of COPD, asthma, smoker half a pack a day, undiagnosed diabetes, morbid obesity    Consultation for non-STEMI, troponin peaked at 2.5, abnormal EKG    17:   Labs reviewed  Sodium, potassium, creatinine stable  A1c 6.9  Troponin peaked at 2.5      BP =137/75  HR =72 nsr      Echocardiogram 17, LVEF 35%, global hypokinesis, severely dilated right ventricle, reduced RV systolic function, enlarged right atrium, possible low-grade low flow stenosis, RVSP 35 mmHg      Review of Systems   Respiratory: Positive for shortness of breath. Negative for cough.    Cardiovascular: Positive for orthopnea and leg swelling. Negative for chest pain, palpitations, claudication and PND.       Physical Exam   Constitutional: He is oriented to person, place, and time.   HENT:   Head: Normocephalic.   Eyes: Conjunctivae are normal.   Cardiovascular: Normal rate and regular rhythm.    Pulses:       Carotid pulses are 2+ on the right side, and 2+ on the left side.       Radial pulses are 2+ on the right side, and 2+ on the left side.   Pulmonary/Chest: He has wheezes.   Abdominal: Soft.   Musculoskeletal: He exhibits edema.   Neurological: He is alert and oriented to person, place, and time.   Skin: Skin is warm and dry.       Hemodynamics:  Temp (24hrs), Av.6 °C (97.9 °F), Min:36.2 °C (97.2 °F), Max:37 °C (98.6 °F)  Temperature: 36.5 °C (97.7 °F)  Pulse  Av.6  Min: 66  Max: 90   Blood Pressure: 137/75     Respiratory:    Respiration: 20, Pulse Oximetry: 94 %, O2 Daily Delivery Respiratory : Silicone Nasal Cannula     Given By:: Mouthpiece, Work Of Breathing / Effort: Mild  RUL Breath Sounds: Diminished, RML Breath Sounds: Diminished, RLL Breath Sounds: Diminished, GENNARO Breath Sounds: Diminished, LLL Breath Sounds: Diminished  Fluids:     Weight: (!)  154.5 kg (340 lb 9.8 oz)  GI/Nutrition:  Orders Placed This Encounter   Procedures   • DIET ORDER     Standing Status:   Standing     Number of Occurrences:   1     Order Specific Question:   Diet:     Answer:   Cardiac [6]     Order Specific Question:   Diet:     Answer:   Diabetic [3]     Lab Results:  Recent Labs      11/19/17   1550  11/20/17   0526   WBC  12.2*  16.2*   RBC  6.00  6.24*   HEMOGLOBIN  15.4  15.5   HEMATOCRIT  49.3  50.7   MCV  82.2  81.3*   MCH  25.7*  24.8*   MCHC  31.2*  30.6*   RDW  62.6*  62.8*   PLATELETCT  290  351   MPV  10.0  11.1     Recent Labs      11/19/17   1550  11/20/17   0302   SODIUM  136  136   POTASSIUM  4.5  4.1   CHLORIDE  105  105   CO2  24  21   GLUCOSE  128*  146*   BUN  18  19   CREATININE  1.31  1.10   CALCIUM  9.3  8.9     Recent Labs      11/20/17   1825  11/21/17   0020  11/21/17   0715   APTT  56.4*  43.9*  54.8*     Recent Labs      11/19/17   1550   BNPBTYPENAT  930*     Recent Labs      11/19/17   1550  11/19/17   1829  11/19/17   2309   TROPONINI  2.51*  1.95*  1.61*   BNPBTYPENAT  930*   --    --              Medical Decision Making, by Problem:  Active Hospital Problems    Diagnosis   • NSTEMI (non-ST elevated myocardial infarction) (CMS-HCC) [I21.4]   • COPD exacerbation (CMS-HCC) [J44.1]   • Pneumonia [J18.9]   • Cardiomyopathy (CMS-HCC) [I42.9]   • Acute pulmonary edema (CMS-HCC) [J81.0]       Assessment/Plan:    1. Cardiomyopathy:  -  LVEF 35% (no other echo available to compare with),  -  plan for cath when respiratory status stabilizes, unable to stay flat more than few seconds this morning  - Non-STEMI on aspirin 325, Lipitor 40, metoprolol 25 BID, currently on heparin drip, no angina  - No rhythm issues overnight, normal sinus rhythm with first-degree AV block      2. Acute decompensated systolic and diastolic heart failure:  - on furosemide 40 IV daily with good duiresis and patient feels breathing better this am. BMP ordered. One time furosemide 40mg  IV       3. COPD/pneumonia  -  on Rocephin ,doxycycline, prednisone 40 mg ,     I/O = -5246.4, weight down 10lbs since admission     Plan for left heart catheter when respiratory status stable    Consider adding ace inhibitor         Reviewed items::  Medications reviewed and Labs reviewed

## 2017-11-21 NOTE — HEART FAILURE PROGRAM
"Cardiovascular Nurse Navigator () Progress Note:     Please note this is a newly diagnosed systolic HF pt. As of 11/21 diuresis is ongoing, Cardiology following. Pt will require angiogram when he is able to lie flat.     [facility] Plan Notes:   None    Therapy Notes:   None    Insurance Coverage:  Medicare Primary, Medicaid Secondary    Patient Residence:  [place]    Follow up appointment:   Pt must have an appointment scheduled within 7 days of discharge (Cardiology, PCP, or DC Clinic).      This D[facility] has placed an order for Hospital Schedulers to arrange f/u appointment within seven calendar days of anticipated discharge date: 11/23. Get into HF Program eventually if not for seven day appt.    Education:  Bedside nursing to please provide patient with HF packet and begin teaching and documenting in education tab, each shift should teach sections until all are covered.     When completing the after visit summary (discharge instructions) please select \"Cardiac Diagnosis, and Heart Failure\" in the special instructions section to populate the heart failure specific discharge instructions.     Referrals Placed:    Social Work     Assess for any social determinants that preclude patient's ability to:    Take medications daily (able to pick them up and afford them)     Weigh daily (safe living space that allows keeping equipment)     Go to follow up appointments regularly (getting there and paying co-pays)     Eat low sodium foods (access to markets that provide fresh foods, equipment in the home to prepare fresh foods ie: something more than a microwave/hot plate)    Call physician for worsening signs and symptoms (has a phone with minutes) to call physician when symptoms worsen     Thank you and please call DILIP Vazquez with any questions: [phone].   "

## 2017-11-21 NOTE — PROGRESS NOTES
[facility] Hospitalist Progress Note    Date of Service: 2017    Chief Complaint  51 y.o. male admitted 2017 with 3 days of SOB and L leg edema.  On admission CXR showing pulmonary edema, Trops elevated (NSTEMI), thought to be having AECOPD/CAP as well    Interval Problem Update  Sob with exertion    No chest pain    Afebrile    Left heart cath pending    Ef 35%    Still has leg edema    Consultants/Specialty  Cardiology    Disposition  pending        Review of Systems   Constitutional: Positive for malaise/fatigue. Negative for chills and fever.   Respiratory: Positive for cough and shortness of breath.    Cardiovascular: Positive for leg swelling. Negative for chest pain.   Gastrointestinal: Negative for abdominal pain, diarrhea, nausea and vomiting.   Musculoskeletal: Negative for back pain.   Skin: Negative for rash.   Neurological: Negative for dizziness, loss of consciousness and headaches.   All other systems reviewed and are negative.     Physical Exam  Laboratory/Imaging   Hemodynamics  Temp (24hrs), Av.6 °C (97.9 °F), Min:36.2 °C (97.2 °F), Max:37 °C (98.6 °F)   Temperature: 36.6 °C (97.8 °F)  Pulse  Av.4  Min: 66  Max: 93    Blood Pressure: 139/84      Respiratory      Respiration: 16, Pulse Oximetry: 93 %, O2 Daily Delivery Respiratory : Silicone Nasal Cannula     Given By:: Mouthpiece, Work Of Breathing / Effort: Mild  RUL Breath Sounds: Diminished, RML Breath Sounds: Diminished, RLL Breath Sounds: Diminished, GENNARO Breath Sounds: Diminished, LLL Breath Sounds: Diminished    Fluids    Intake/Output Summary (Last 24 hours) at 17 0957  Last data filed at 17 0859   Gross per 24 hour   Intake                0 ml   Output             1500 ml   Net            -1500 ml       Nutrition  Orders Placed This Encounter   Procedures   • DIET ORDER     Standing Status:   Standing     Number of Occurrences:   1     Order Specific Question:   Diet:     Answer:   Cardiac [6]     Order Specific  Question:   Diet:     Answer:   Diabetic [3]     Physical Exam   Constitutional: He is oriented to person, place, and time. He appears well-developed and well-nourished. No distress.   HENT:   Head: Normocephalic and atraumatic.   Eyes: Conjunctivae are normal.   Neck: No JVD present.   Cardiovascular: Normal rate.  Exam reveals no gallop.    No murmur heard.  Pulmonary/Chest: Effort normal. No stridor. No respiratory distress. He has no wheezes. He has no rales.   Decreased bs at bases   Abdominal: Soft. There is no tenderness. There is no rebound and no guarding.   Musculoskeletal: He exhibits edema.   R>L   Neurological: He is oriented to person, place, and time.   Skin: Skin is warm and dry. No rash noted. He is not diaphoretic.   Psychiatric: He has a normal mood and affect. Thought content normal.   Nursing note and vitals reviewed.      Recent Labs      11/19/17   1550  11/20/17   0526   WBC  12.2*  16.2*   RBC  6.00  6.24*   HEMOGLOBIN  15.4  15.5   HEMATOCRIT  49.3  50.7   MCV  82.2  81.3*   MCH  25.7*  24.8*   MCHC  31.2*  30.6*   RDW  62.6*  62.8*   PLATELETCT  290  351   MPV  10.0  11.1     Recent Labs      11/19/17   1550  11/20/17   0302  11/21/17   0020   SODIUM  136  136  137   POTASSIUM  4.5  4.1  4.1   CHLORIDE  105  105  101   CO2  24  21  26   GLUCOSE  128*  146*  98   BUN  18  19  23*   CREATININE  1.31  1.10  1.24   CALCIUM  9.3  8.9  9.2     Recent Labs      11/20/17   1825  11/21/17   0020  11/21/17   0715   APTT  56.4*  43.9*  54.8*     Recent Labs      11/19/17   1550   BNPBTYPENAT  930*              Assessment/Plan     * NSTEMI (non-ST elevated myocardial infarction) (CMS-[facility])- (present on admission)   Assessment & Plan    Cards following  Plan C  On heparin gtts  Cont statin, BB, ASA  Pt is CP free at present        COPD exacerbation (CMS-HCC)- (present on admission)   Assessment & Plan    Improved exam and complaint  Likely cardiac induced bronchospasm  Prednisone 20mg po daily for 5  days  Cont BD's and O2 support        Type 2 diabetes mellitus (CMS-HCC)   Assessment & Plan    New Dx  Diabetic educator consult  Diabetic diet  Cont SSI for now  A1c 6.9  Hope to dc on po meds but will hold for now given pending cath        Acute pulmonary edema (CMS-HCC)- (present on admission)   Assessment & Plan    Diuresis as noted  Follow daily BMP and I&Os        Cardiomyopathy (CMS-HCC)- (present on admission)   Assessment & Plan    ?ischemic  Cards following  LHC planned  On BB, statin, lasix, ASA          Pneumonia- (present on admission)   Assessment & Plan    Doxy            Reviewed items::  EKG reviewed, Radiology images reviewed, Labs reviewed and Medications reviewed  Ceballos catheter::  No Ceballos  DVT prophylaxis pharmacological::  Heparin  DVT prophylaxis - mechanical:  SCDs  Ulcer Prophylaxis::  Not indicated  Antibiotics:  Treating active infection/contamination beyond 24 hours perioperative coverage      Ace I started

## 2017-11-22 ENCOUNTER — APPOINTMENT (OUTPATIENT)
Dept: RADIOLOGY | Facility: MEDICAL CENTER | Age: 52
DRG: 280 | End: 2017-11-22
Attending: NURSE PRACTITIONER
Payer: MEDICARE

## 2017-11-22 LAB
ANION GAP SERPL CALC-SCNC: 16 MMOL/L (ref 0–11.9)
ANION GAP SERPL CALC-SCNC: 9 MMOL/L (ref 0–11.9)
APTT PPP: 42.8 SEC (ref 24.7–36)
APTT PPP: 60 SEC (ref 24.7–36)
BACTERIA SPEC RESP CULT: NORMAL
BUN SERPL-MCNC: 23 MG/DL (ref 8–22)
BUN SERPL-MCNC: 24 MG/DL (ref 8–22)
CALCIUM SERPL-MCNC: 9.1 MG/DL (ref 8.5–10.5)
CALCIUM SERPL-MCNC: 9.3 MG/DL (ref 8.5–10.5)
CHLORIDE SERPL-SCNC: 101 MMOL/L (ref 96–112)
CHLORIDE SERPL-SCNC: 96 MMOL/L (ref 96–112)
CO2 SERPL-SCNC: 21 MMOL/L (ref 20–33)
CO2 SERPL-SCNC: 22 MMOL/L (ref 20–33)
CREAT SERPL-MCNC: 1.22 MG/DL (ref 0.5–1.4)
CREAT SERPL-MCNC: 1.24 MG/DL (ref 0.5–1.4)
ERYTHROCYTE [DISTWIDTH] IN BLOOD BY AUTOMATED COUNT: 59.2 FL (ref 35.9–50)
GFR SERPL CREATININE-BSD FRML MDRD: >60 ML/MIN/1.73 M 2
GFR SERPL CREATININE-BSD FRML MDRD: >60 ML/MIN/1.73 M 2
GLUCOSE BLD-MCNC: 100 MG/DL (ref 65–99)
GLUCOSE BLD-MCNC: 132 MG/DL (ref 65–99)
GLUCOSE BLD-MCNC: 140 MG/DL (ref 65–99)
GLUCOSE BLD-MCNC: 169 MG/DL (ref 65–99)
GLUCOSE SERPL-MCNC: 138 MG/DL (ref 65–99)
GLUCOSE SERPL-MCNC: 171 MG/DL (ref 65–99)
GRAM STN SPEC: NORMAL
HCT VFR BLD AUTO: 48.2 % (ref 42–52)
HGB BLD-MCNC: 14.9 G/DL (ref 14–18)
INR PPP: 1.17 (ref 0.87–1.13)
MCH RBC QN AUTO: 25 PG (ref 27–33)
MCHC RBC AUTO-ENTMCNC: 30.9 G/DL (ref 33.7–35.3)
MCV RBC AUTO: 81 FL (ref 81.4–97.8)
PLATELET # BLD AUTO: 284 K/UL (ref 164–446)
PMV BLD AUTO: 10.5 FL (ref 9–12.9)
POTASSIUM SERPL-SCNC: 3.7 MMOL/L (ref 3.6–5.5)
POTASSIUM SERPL-SCNC: 4.5 MMOL/L (ref 3.6–5.5)
PROTHROMBIN TIME: 14.6 SEC (ref 12–14.6)
RBC # BLD AUTO: 5.95 M/UL (ref 4.7–6.1)
SIGNIFICANT IND 70042: NORMAL
SITE SITE: NORMAL
SODIUM SERPL-SCNC: 132 MMOL/L (ref 135–145)
SODIUM SERPL-SCNC: 133 MMOL/L (ref 135–145)
SOURCE SOURCE: NORMAL
WBC # BLD AUTO: 15.7 K/UL (ref 4.8–10.8)

## 2017-11-22 PROCEDURE — 85027 COMPLETE CBC AUTOMATED: CPT

## 2017-11-22 PROCEDURE — 85730 THROMBOPLASTIN TIME PARTIAL: CPT

## 2017-11-22 PROCEDURE — 82962 GLUCOSE BLOOD TEST: CPT | Mod: 91

## 2017-11-22 PROCEDURE — A9270 NON-COVERED ITEM OR SERVICE: HCPCS | Performed by: HOSPITALIST

## 2017-11-22 PROCEDURE — 700102 HCHG RX REV CODE 250 W/ 637 OVERRIDE(OP): Performed by: NURSE PRACTITIONER

## 2017-11-22 PROCEDURE — 36415 COLL VENOUS BLD VENIPUNCTURE: CPT

## 2017-11-22 PROCEDURE — 85610 PROTHROMBIN TIME: CPT

## 2017-11-22 PROCEDURE — 700102 HCHG RX REV CODE 250 W/ 637 OVERRIDE(OP): Performed by: HOSPITALIST

## 2017-11-22 PROCEDURE — 700111 HCHG RX REV CODE 636 W/ 250 OVERRIDE (IP): Performed by: HOSPITALIST

## 2017-11-22 PROCEDURE — 99232 SBSQ HOSP IP/OBS MODERATE 35: CPT | Performed by: HOSPITALIST

## 2017-11-22 PROCEDURE — A9270 NON-COVERED ITEM OR SERVICE: HCPCS | Performed by: NURSE PRACTITIONER

## 2017-11-22 PROCEDURE — 770020 HCHG ROOM/CARE - TELE (206)

## 2017-11-22 PROCEDURE — 700101 HCHG RX REV CODE 250: Performed by: HOSPITALIST

## 2017-11-22 PROCEDURE — 94760 N-INVAS EAR/PLS OXIMETRY 1: CPT

## 2017-11-22 PROCEDURE — 94640 AIRWAY INHALATION TREATMENT: CPT

## 2017-11-22 PROCEDURE — 80048 BASIC METABOLIC PNL TOTAL CA: CPT

## 2017-11-22 PROCEDURE — 71020 DX-CHEST-2 VIEWS: CPT

## 2017-11-22 RX ADMIN — LISINOPRIL 10 MG: 5 TABLET ORAL at 08:23

## 2017-11-22 RX ADMIN — POTASSIUM CHLORIDE 10 MEQ: 1500 TABLET, EXTENDED RELEASE ORAL at 08:23

## 2017-11-22 RX ADMIN — ALBUTEROL SULFATE 2 PUFF: 90 AEROSOL, METERED RESPIRATORY (INHALATION) at 05:53

## 2017-11-22 RX ADMIN — INSULIN HUMAN 2 UNITS: 100 INJECTION, SOLUTION PARENTERAL at 17:18

## 2017-11-22 RX ADMIN — IPRATROPIUM BROMIDE AND ALBUTEROL SULFATE 3 ML: .5; 3 SOLUTION RESPIRATORY (INHALATION) at 11:02

## 2017-11-22 RX ADMIN — HYDRALAZINE HYDROCHLORIDE 25 MG: 25 TABLET, FILM COATED ORAL at 20:36

## 2017-11-22 RX ADMIN — HEPARIN SODIUM 25000 UNITS: 5000 INJECTION, SOLUTION INTRAVENOUS at 06:53

## 2017-11-22 RX ADMIN — HYDRALAZINE HYDROCHLORIDE 25 MG: 25 TABLET, FILM COATED ORAL at 15:19

## 2017-11-22 RX ADMIN — ASPIRIN 325 MG: 325 TABLET, COATED ORAL at 08:23

## 2017-11-22 RX ADMIN — METOPROLOL TARTRATE 25 MG: 25 TABLET, FILM COATED ORAL at 08:23

## 2017-11-22 RX ADMIN — ALBUTEROL SULFATE 2 PUFF: 90 AEROSOL, METERED RESPIRATORY (INHALATION) at 00:46

## 2017-11-22 RX ADMIN — METOPROLOL TARTRATE 25 MG: 25 TABLET, FILM COATED ORAL at 20:36

## 2017-11-22 RX ADMIN — IPRATROPIUM BROMIDE AND ALBUTEROL SULFATE 3 ML: .5; 3 SOLUTION RESPIRATORY (INHALATION) at 07:30

## 2017-11-22 RX ADMIN — IPRATROPIUM BROMIDE AND ALBUTEROL SULFATE 3 ML: .5; 3 SOLUTION RESPIRATORY (INHALATION) at 19:58

## 2017-11-22 RX ADMIN — ATORVASTATIN CALCIUM 40 MG: 40 TABLET, FILM COATED ORAL at 20:36

## 2017-11-22 RX ADMIN — FUROSEMIDE 40 MG: 10 INJECTION, SOLUTION INTRAMUSCULAR; INTRAVENOUS at 08:23

## 2017-11-22 RX ADMIN — HEPARIN SODIUM 25000 UNITS: 5000 INJECTION, SOLUTION INTRAVENOUS at 16:47

## 2017-11-22 RX ADMIN — PREDNISONE 20 MG: 20 TABLET ORAL at 08:23

## 2017-11-22 RX ADMIN — HYDRALAZINE HYDROCHLORIDE 25 MG: 25 TABLET, FILM COATED ORAL at 05:53

## 2017-11-22 ASSESSMENT — ENCOUNTER SYMPTOMS
CLAUDICATION: 0
NAUSEA: 0
COUGH: 0
BACK PAIN: 0
FEVER: 0
LOSS OF CONSCIOUSNESS: 0
DIARRHEA: 0
PND: 0
SHORTNESS OF BREATH: 0
DIZZINESS: 0
VOMITING: 0
PALPITATIONS: 0
HEADACHES: 0
COUGH: 1
ORTHOPNEA: 0
ABDOMINAL PAIN: 0
CHILLS: 0
SHORTNESS OF BREATH: 1

## 2017-11-22 ASSESSMENT — PAIN SCALES - GENERAL
PAINLEVEL_OUTOF10: 0

## 2017-11-22 NOTE — DIETARY
NUTRITION SERVICES: BMI - Pt with BMI >40 (=40.18). Weight loss counseling not appropriate in acute care setting. RECOMMEND - Referral to outpatient nutrition services for weight management after D/C. RD available PRN.

## 2017-11-22 NOTE — PROGRESS NOTES
Diabetes education: Met with Joshua, briefly as he was sitting at the side of the bed completing his dinner. Pt states he does want a meter but would like education tomorrow. CDE will follow up tomorrow. Please call [phone] if needs change.

## 2017-11-22 NOTE — FLOWSHEET NOTE
11/21/17 2002   Events/Summary/Plan   Events/Summary/Plan SVN   SVN Group   #SVN Performed Yes   Given By: Mouthpiece   Respiratory WDL   Respiratory (WDL) X   Chest Exam   Work Of Breathing / Effort Mild   Respiration 18   Pulse 70   Breath Sounds   Pre/Post Intervention Pre Intervention Assessment   RUL Breath Sounds Diminished   RML Breath Sounds Diminished   RLL Breath Sounds Diminished   GENNARO Breath Sounds Diminished   LLL Breath Sounds Diminished   Oximetry   #Pulse Oximetry (Single Determination) Yes   Oxygen   Pulse Oximetry 96 %   O2 (LPM) 3   O2 Daily Delivery Respiratory  Silicone Nasal Cannula

## 2017-11-22 NOTE — PROGRESS NOTES
Assumed care of pt. at 1900    Bedside report received from MIN Alas   POC discussed with pt. At bedside and Pt. verbalizes understanding.  Pt. Is Awake and AOx 4, sitting on edge of bed on 2L O2 via NC, , running SR 84 on the monitor  Call light within reach  with appropriate instructions to call for assistance  Bed locked and in lowest position.

## 2017-11-22 NOTE — PROGRESS NOTES
[facility]    Date of Service: 2017    Chief Complaint  51 y.o. male admitted 2017 with 3 days of SOB and L leg edema.  On admission CXR showing pulmonary edema, Trops elevated (NSTEMI), thought to be having AECOPD/CAP as well    Interval Problem Update  Sob with exertion    No chest pain    Afebrile    Left heart cath pending.. Patient able to lay flat now    Ef 35%    Still has leg edema    Consultants/Specialty  Cardiology    Disposition  pending        Review of Systems   Constitutional: Positive for malaise/fatigue. Negative for chills and fever.   Respiratory: Positive for cough and shortness of breath.    Cardiovascular: Positive for leg swelling. Negative for chest pain.   Gastrointestinal: Negative for abdominal pain, diarrhea, nausea and vomiting.   Musculoskeletal: Negative for back pain.   Skin: Negative for rash.   Neurological: Negative for dizziness, loss of consciousness and headaches.   All other systems reviewed and are negative.     Physical Exam  Laboratory/Imaging   Hemodynamics  Temp (24hrs), Av.3 °C (97.3 °F), Min:36.1 °C (96.9 °F), Max:36.6 °C (97.9 °F)   Temperature: 36.2 °C (97.2 °F)  Pulse  Av.7  Min: 63  Max: 93    Blood Pressure: 113/75      Respiratory      Respiration: 20, Pulse Oximetry: 96 %, O2 Daily Delivery Respiratory : Silicone Nasal Cannula     Given By:: Mouthpiece, Work Of Breathing / Effort: Mild  RUL Breath Sounds: Diminished, RML Breath Sounds: Diminished, RLL Breath Sounds: Diminished, GENNARO Breath Sounds: Diminished, LLL Breath Sounds: Diminished    Fluids    Intake/Output Summary (Last 24 hours) at 17 1029  Last data filed at 17 0831   Gross per 24 hour   Intake             1619 ml   Output             4225 ml   Net            -2606 ml       Nutrition  Orders Placed This Encounter   Procedures   • DIET ORDER     Standing Status:   Standing     Number of Occurrences:   1     Order Specific Question:   Diet:     Answer:    Cardiac [6]     Order Specific Question:   Diet:     Answer:   Diabetic [3]     Physical Exam   Constitutional: He is oriented to person, place, and time. He appears well-developed and well-nourished. No distress.   HENT:   Head: Normocephalic and atraumatic.   Eyes: Conjunctivae are normal.   Neck: No JVD present.   Cardiovascular: Normal rate.  Exam reveals no gallop.    No murmur heard.  Pulmonary/Chest: Effort normal. No stridor. No respiratory distress. He has no wheezes. He has no rales.   Decreased bs at bases   Abdominal: Soft. There is no tenderness. There is no rebound and no guarding.   Musculoskeletal: He exhibits edema.   Neurological: He is oriented to person, place, and time.   Skin: Skin is warm and dry. No rash noted. He is not diaphoretic.   Psychiatric: He has a normal mood and affect. Thought content normal.   Nursing note and vitals reviewed.      Recent Labs      11/19/17   1550  11/20/17   0526   WBC  12.2*  16.2*   RBC  6.00  6.24*   HEMOGLOBIN  15.4  15.5   HEMATOCRIT  49.3  50.7   MCV  82.2  81.3*   MCH  25.7*  24.8*   MCHC  31.2*  30.6*   RDW  62.6*  62.8*   PLATELETCT  290  351   MPV  10.0  11.1     Recent Labs      11/19/17   1550  11/20/17   0302  11/21/17   0020   SODIUM  136  136  137   POTASSIUM  4.5  4.1  4.1   CHLORIDE  105  105  101   CO2  24  21  26   GLUCOSE  128*  146*  98   BUN  18  19  23*   CREATININE  1.31  1.10  1.24   CALCIUM  9.3  8.9  9.2     Recent Labs      11/21/17   0020  11/21/17   0715  11/21/17   1301   APTT  43.9*  54.8*  86.6*     Recent Labs      11/19/17   1550   BNPBTYPENAT  930*              Assessment/Plan     * NSTEMI (non-ST elevated myocardial infarction) (CMS-HCC)- (present on admission)   Assessment & Plan    Cards following  Plan C  On heparin gtts  Cont statin, BB, ASA  Pt is CP free at present        COPD exacerbation (CMS-[facility])- (present on admission)   Assessment & Plan    Improved exam and complaint  Likely cardiac induced  bronchospasm  Prednisone 20mg po daily for 5 days  Cont BD's and O2 support        Type 2 diabetes mellitus (CMS-HCC)   Assessment & Plan    New Dx  Diabetic educator consult  Diabetic diet  Cont SSI for now  A1c 6.9  Hope to dc on po meds but will hold for now given pending cath        Acute pulmonary edema (CMS-HCC)- (present on admission)   Assessment & Plan    Diuresis as noted  Follow daily BMP and I&Os        Cardiomyopathy (CMS-HCC)- (present on admission)   Assessment & Plan    ?ischemic  Cards following  LHC planned  On BB, statin, lasix, ASA          Pneumonia- (present on admission)   Assessment & Plan    Doxy            Reviewed items::  EKG reviewed, Radiology images reviewed, Labs reviewed and Medications reviewed  Ceballos catheter::  No Ceballos  DVT prophylaxis pharmacological::  Heparin  DVT prophylaxis - mechanical:  SCDs  Ulcer Prophylaxis::  Not indicated  Antibiotics:  Treating active infection/contamination beyond 24 hours perioperative coverage      Ace I on board    Check am cbc, bmp, bnp

## 2017-11-22 NOTE — PROGRESS NOTES
Cardiology Progress Note               Author: Lenore Steele Date & Time created: 2017  8:04 AM     51 years old male present with cough and dyspnea x4. History of COPD, asthma, smoker half a pack a day, undiagnosed diabetes, morbid obesity    Consultation for non-STEMI, troponin peaked at 2.5, abnormal EKG    17: patient able to lay flat today  Labs reviewed  Sodium, potassium, creatinine stable  A1c 6.9  Troponin peaked at 2.5      BP =117/94  HR =83 nsr      Echocardiogram 17, LVEF 35%, global hypokinesis, severely dilated right ventricle, reduced RV systolic function, enlarged right atrium, possible low-grade low flow stenosis, RVSP 35 mmHg      Review of Systems   Respiratory: Negative for cough and shortness of breath.    Cardiovascular: Positive for leg swelling. Negative for chest pain, palpitations, orthopnea, claudication and PND.       Physical Exam   Constitutional: He is oriented to person, place, and time.   HENT:   Head: Normocephalic.   Eyes: Conjunctivae are normal.   Cardiovascular: Normal rate and regular rhythm.    Pulses:       Carotid pulses are 2+ on the right side, and 2+ on the left side.       Radial pulses are 2+ on the right side, and 2+ on the left side.   Pulmonary/Chest: He has no wheezes.   Abdominal: Soft.   Musculoskeletal: He exhibits edema.   Neurological: He is alert and oriented to person, place, and time.   Skin: Skin is warm and dry.       Hemodynamics:  Temp (24hrs), Av.3 °C (97.4 °F), Min:36.1 °C (96.9 °F), Max:36.6 °C (97.9 °F)  Temperature: 36.6 °C (97.9 °F)  Pulse  Av.9  Min: 63  Max: 93   Blood Pressure: 117/94     Respiratory:    Respiration: 20, Pulse Oximetry: 96 %, O2 Daily Delivery Respiratory : Silicone Nasal Cannula     Given By:: Mouthpiece, Work Of Breathing / Effort: Mild  RUL Breath Sounds: Diminished, RML Breath Sounds: Diminished, RLL Breath Sounds: Diminished, GENNARO Breath Sounds: Diminished, LLL Breath Sounds:  Diminished  Fluids:     Weight: (!) 156.3 kg (344 lb 9.3 oz)  GI/Nutrition:  Orders Placed This Encounter   Procedures   • DIET ORDER     Standing Status:   Standing     Number of Occurrences:   1     Order Specific Question:   Diet:     Answer:   Cardiac [6]     Order Specific Question:   Diet:     Answer:   Diabetic [3]     Lab Results:  Recent Labs      11/19/17   1550  11/20/17   0526   WBC  12.2*  16.2*   RBC  6.00  6.24*   HEMOGLOBIN  15.4  15.5   HEMATOCRIT  49.3  50.7   MCV  82.2  81.3*   MCH  25.7*  24.8*   MCHC  31.2*  30.6*   RDW  62.6*  62.8*   PLATELETCT  290  351   MPV  10.0  11.1     Recent Labs      11/19/17   1550  11/20/17   0302  11/21/17   0020   SODIUM  136  136  137   POTASSIUM  4.5  4.1  4.1   CHLORIDE  105  105  101   CO2  24  21  26   GLUCOSE  128*  146*  98   BUN  18  19  23*   CREATININE  1.31  1.10  1.24   CALCIUM  9.3  8.9  9.2     Recent Labs      11/21/17   0020  11/21/17   0715  11/21/17   1301   APTT  43.9*  54.8*  86.6*     Recent Labs      11/19/17   1550   BNPBTYPENAT  930*     Recent Labs      11/19/17   1550  11/19/17   1829  11/19/17   2309   TROPONINI  2.51*  1.95*  1.61*   BNPBTYPENAT  930*   --    --              Medical Decision Making, by Problem:  Active Hospital Problems    Diagnosis   • NSTEMI (non-ST elevated myocardial infarction) (CMS-[facility]) [I21.4]   • COPD exacerbation (CMS-[facility]) [J44.1]   • Pneumonia [J18.9]   • Cardiomyopathy (CMS-[facility]) [I42.9]   • Acute pulmonary edema (CMS-[facility]) [J81.0]       Assessment/Plan:    1. Cardiomyopathy:  -  LVEF 35% (no other echo available to compare with),  -  plan for cath possibly tomorrow   - Non-STEMI on aspirin 325, Lipitor 40, metoprolol 25 BID, lisinopril 10mg qd, currently on heparin drip, no angina  - No rhythm issues overnight, normal sinus rhythm with first-degree AV block      2. Acute decompensated systolic and diastolic heart failure:  - on furosemide 40 IV daily with good duiresis and patient feels breathing better this  am. BMP ordered. One time furosemide 40mg IV       3. COPD/pneumonia  -  on Rocephin ,doxycycline, prednisone 40 mg ,     I/O = -8862.4, weight down 10lbs since admission     Plan for left heart catheter tomorrow       Reviewed items::  Medications reviewed and Labs reviewed

## 2017-11-22 NOTE — CARE PLAN
Problem: Nutritional:  Goal: Patient to verbalize or demonstrate understanding of diet  Outcome: MET Date Met: 11/21/17  Met with pt, pt states he was seen by RD and given DM diet education on 11/20, reports he has no further questions/concerns. RD noted diet education completed on 11/20 in RD info tab and RD notes. RD available PRN.

## 2017-11-22 NOTE — DOCUMENTATION QUERY
DOCUMENTATION QUERY    PROVIDERS: Please select “Cosign w/ note”to reply to query.    To better represent the severity of illness of your patient, please review the following information and exercise your independent professional judgment in responding to this query.     CVA is documented in the Cardiologist consult note from Dr. Guajardo on 11/19. Per 11/22 progress note by Cedric LOTT, CVA is not documented. Based upon the clinical findings, risk factors, and treatment, can this diagnosis be further specified?    • CVA has been ruled in  • CVA has been ruled out  • Other explanation of clinical findings  • Unable to determine          The medical record reflects the following:   Clinical Findings  documented in cardiologist consult note   Treatment  tele monitoring   Risk Factors  smoker, diabetic, morbid obesity, NSTEMI   Location within medical record  Progress Notes     Thank you,   Zhang Oconnell RN BSN  Clinical   [phone]

## 2017-11-22 NOTE — CARE PLAN
Problem: Safety  Goal: Will remain free from falls  Outcome: PROGRESSING AS EXPECTED  Jossie Wolf Fall Risk Assessment:     Last Known Fall: No falls  Mobility: Dizziness/generalized weakness  Medications: Diuretics, Cardiovascular or central nervous system meds  Mental Status/LOC/Awareness: Awake, alert, and oriented to date, place, and person  Toileting Needs: Use of assistive device (Bedside commode, bedpan, urinal)  Volume/Electrolyte Status: Use of IV fluids/tube feeds  Communication/Sensory: No deficits  Behavior: Appropriate behavior  Jossie Wolf Fall Risk Total: 11  Fall Risk Level: MODERATE RISK    Universal Fall Precautions:  call light/belongings in reach, bed in low position and locked, siderails up x 2, adequate lighting, educate on level of risk, educate to call for assistance    Fall Risk Level Interventions:    TRIAL (TELE 8, NEURO, MED KURT 5) Moderate Fall Risk Interventions  Place yellow fall risk ID band on patient: verified  Provide patient/family education based on risk assessment : completed  Educate patient/family to call staff for assistance when getting out of bed: completed  Place fall precaution signage outside patient door: verified  Utilize bed/chair fall alarm: refused     Patient Specific Interventions:     Medication: review medications with patient and family  Mental Status/LOC/Awareness: reinforce falls education, check on patient hourly and reinforce the use of call light  Toileting: provide frquent toileting  Volume/Electrolyte Status: advance diet as tolerated, monitor abnormal lab values and ensure IV fluids are appropriate  Communication/Sensory: update plan of care on whiteboard  Behavioral: engage patient in daily activities and administer medication as ordered  Mobility: ensure bed is locked and in lowest position, provide appropriate assistive device and instruct patient to exit bed on their strongest side    Problem: Knowledge Deficit  Goal: Knowledge of the  prescribed therapeutic regimen will improve  Outcome: PROGRESSING AS EXPECTED  Heart failure education packet given to patient. Went through packet briefly, with explanations on each item in packet, and gave pt. Materials to write down any questions he may have while looking through packet.      Problem: Knowledge Deficit:  Goal: Knowledge of the prescribed therapeutic regimen will improve  Outcome: PROGRESSING AS EXPECTED  Heart failure education packet given to patient. Went through packet briefly, with explanations on each item in packet, and gave pt. Materials to write down any questions he may have while looking through packet.

## 2017-11-23 PROBLEM — I50.21 ACUTE SYSTOLIC CHF (CONGESTIVE HEART FAILURE) (HCC): Status: ACTIVE | Noted: 2017-11-23

## 2017-11-23 LAB
ACT BLD: 136 SEC (ref 74–137)
ACT BLD: 158 SEC (ref 74–137)
ANION GAP SERPL CALC-SCNC: 12 MMOL/L (ref 0–11.9)
APTT PPP: 75.9 SEC (ref 24.7–36)
BUN SERPL-MCNC: 22 MG/DL (ref 8–22)
CALCIUM SERPL-MCNC: 9 MG/DL (ref 8.5–10.5)
CHLORIDE SERPL-SCNC: 99 MMOL/L (ref 96–112)
CO2 SERPL-SCNC: 25 MMOL/L (ref 20–33)
CREAT SERPL-MCNC: 1.11 MG/DL (ref 0.5–1.4)
ERYTHROCYTE [DISTWIDTH] IN BLOOD BY AUTOMATED COUNT: 59.3 FL (ref 35.9–50)
GFR SERPL CREATININE-BSD FRML MDRD: >60 ML/MIN/1.73 M 2
GLUCOSE BLD-MCNC: 116 MG/DL (ref 65–99)
GLUCOSE BLD-MCNC: 160 MG/DL (ref 65–99)
GLUCOSE BLD-MCNC: 84 MG/DL (ref 65–99)
GLUCOSE BLD-MCNC: 89 MG/DL (ref 65–99)
GLUCOSE SERPL-MCNC: 96 MG/DL (ref 65–99)
HCT VFR BLD AUTO: 46.7 % (ref 42–52)
HGB BLD-MCNC: 14.5 G/DL (ref 14–18)
MCH RBC QN AUTO: 25.2 PG (ref 27–33)
MCHC RBC AUTO-ENTMCNC: 31 G/DL (ref 33.7–35.3)
MCV RBC AUTO: 81.2 FL (ref 81.4–97.8)
PLATELET # BLD AUTO: 273 K/UL (ref 164–446)
PMV BLD AUTO: 10.7 FL (ref 9–12.9)
POTASSIUM SERPL-SCNC: 3.5 MMOL/L (ref 3.6–5.5)
PROCALCITONIN SERPL-MCNC: 0.15 NG/ML
RBC # BLD AUTO: 5.75 M/UL (ref 4.7–6.1)
SODIUM SERPL-SCNC: 136 MMOL/L (ref 135–145)
WBC # BLD AUTO: 14.5 K/UL (ref 4.8–10.8)

## 2017-11-23 PROCEDURE — 700111 HCHG RX REV CODE 636 W/ 250 OVERRIDE (IP): Performed by: HOSPITALIST

## 2017-11-23 PROCEDURE — 99152 MOD SED SAME PHYS/QHP 5/>YRS: CPT

## 2017-11-23 PROCEDURE — A9270 NON-COVERED ITEM OR SERVICE: HCPCS | Performed by: NURSE PRACTITIONER

## 2017-11-23 PROCEDURE — 700102 HCHG RX REV CODE 250 W/ 637 OVERRIDE(OP): Performed by: INTERNAL MEDICINE

## 2017-11-23 PROCEDURE — B2111ZZ FLUOROSCOPY OF MULTIPLE CORONARY ARTERIES USING LOW OSMOLAR CONTRAST: ICD-10-PCS | Performed by: INTERNAL MEDICINE

## 2017-11-23 PROCEDURE — 700102 HCHG RX REV CODE 250 W/ 637 OVERRIDE(OP): Performed by: NURSE PRACTITIONER

## 2017-11-23 PROCEDURE — 770020 HCHG ROOM/CARE - TELE (206)

## 2017-11-23 PROCEDURE — 99153 MOD SED SAME PHYS/QHP EA: CPT

## 2017-11-23 PROCEDURE — 93880 EXTRACRANIAL BILAT STUDY: CPT

## 2017-11-23 PROCEDURE — 85347 COAGULATION TIME ACTIVATED: CPT | Mod: 91

## 2017-11-23 PROCEDURE — B2151ZZ FLUOROSCOPY OF LEFT HEART USING LOW OSMOLAR CONTRAST: ICD-10-PCS | Performed by: INTERNAL MEDICINE

## 2017-11-23 PROCEDURE — 82962 GLUCOSE BLOOD TEST: CPT | Mod: 91

## 2017-11-23 PROCEDURE — 85730 THROMBOPLASTIN TIME PARTIAL: CPT

## 2017-11-23 PROCEDURE — 700102 HCHG RX REV CODE 250 W/ 637 OVERRIDE(OP): Performed by: HOSPITALIST

## 2017-11-23 PROCEDURE — 700101 HCHG RX REV CODE 250: Performed by: HOSPITALIST

## 2017-11-23 PROCEDURE — A9270 NON-COVERED ITEM OR SERVICE: HCPCS | Performed by: HOSPITALIST

## 2017-11-23 PROCEDURE — 84145 PROCALCITONIN (PCT): CPT

## 2017-11-23 PROCEDURE — A9270 NON-COVERED ITEM OR SERVICE: HCPCS | Performed by: INTERNAL MEDICINE

## 2017-11-23 PROCEDURE — C1887 CATHETER, GUIDING: HCPCS

## 2017-11-23 PROCEDURE — 93571 IV DOP VEL&/PRESS C FLO 1ST: CPT | Mod: 52

## 2017-11-23 PROCEDURE — 307093 HCHG TR BAND RADIAL

## 2017-11-23 PROCEDURE — C1894 INTRO/SHEATH, NON-LASER: HCPCS

## 2017-11-23 PROCEDURE — 36415 COLL VENOUS BLD VENIPUNCTURE: CPT

## 2017-11-23 PROCEDURE — 4A033BC MEASUREMENT OF ARTERIAL PRESSURE, CORONARY, PERCUTANEOUS APPROACH: ICD-10-PCS | Performed by: INTERNAL MEDICINE

## 2017-11-23 PROCEDURE — 304952 HCHG R 2 PADS

## 2017-11-23 PROCEDURE — 80048 BASIC METABOLIC PNL TOTAL CA: CPT

## 2017-11-23 PROCEDURE — 700101 HCHG RX REV CODE 250

## 2017-11-23 PROCEDURE — 4A023N7 MEASUREMENT OF CARDIAC SAMPLING AND PRESSURE, LEFT HEART, PERCUTANEOUS APPROACH: ICD-10-PCS | Performed by: INTERNAL MEDICINE

## 2017-11-23 PROCEDURE — 700117 HCHG RX CONTRAST REV CODE 255: Performed by: INTERNAL MEDICINE

## 2017-11-23 PROCEDURE — 94640 AIRWAY INHALATION TREATMENT: CPT

## 2017-11-23 PROCEDURE — 99233 SBSQ HOSP IP/OBS HIGH 50: CPT | Performed by: HOSPITALIST

## 2017-11-23 PROCEDURE — C1769 GUIDE WIRE: HCPCS

## 2017-11-23 PROCEDURE — 93458 L HRT ARTERY/VENTRICLE ANGIO: CPT

## 2017-11-23 PROCEDURE — 360979 HCHG DIAGNOSTIC CATH

## 2017-11-23 PROCEDURE — 700111 HCHG RX REV CODE 636 W/ 250 OVERRIDE (IP)

## 2017-11-23 PROCEDURE — 700105 HCHG RX REV CODE 258: Performed by: INTERNAL MEDICINE

## 2017-11-23 PROCEDURE — 700111 HCHG RX REV CODE 636 W/ 250 OVERRIDE (IP): Performed by: INTERNAL MEDICINE

## 2017-11-23 PROCEDURE — 94760 N-INVAS EAR/PLS OXIMETRY 1: CPT

## 2017-11-23 PROCEDURE — 85027 COMPLETE CBC AUTOMATED: CPT

## 2017-11-23 RX ORDER — LIDOCAINE HYDROCHLORIDE 20 MG/ML
INJECTION, SOLUTION INFILTRATION; PERINEURAL
Status: COMPLETED
Start: 2017-11-23 | End: 2017-11-23

## 2017-11-23 RX ORDER — SCOLOPAMINE TRANSDERMAL SYSTEM 1 MG/1
1 PATCH, EXTENDED RELEASE TRANSDERMAL
Status: DISCONTINUED | OUTPATIENT
Start: 2017-11-23 | End: 2017-11-24 | Stop reason: HOSPADM

## 2017-11-23 RX ORDER — HALOPERIDOL 5 MG/ML
1 INJECTION INTRAMUSCULAR EVERY 6 HOURS PRN
Status: DISCONTINUED | OUTPATIENT
Start: 2017-11-23 | End: 2017-11-24 | Stop reason: HOSPADM

## 2017-11-23 RX ORDER — ALBUTEROL SULFATE 90 UG/1
2 AEROSOL, METERED RESPIRATORY (INHALATION) EVERY 4 HOURS PRN
Status: DISCONTINUED | OUTPATIENT
Start: 2017-11-23 | End: 2017-11-24 | Stop reason: HOSPADM

## 2017-11-23 RX ORDER — DEXAMETHASONE SODIUM PHOSPHATE 4 MG/ML
4 INJECTION, SOLUTION INTRA-ARTICULAR; INTRALESIONAL; INTRAMUSCULAR; INTRAVENOUS; SOFT TISSUE
Status: DISCONTINUED | OUTPATIENT
Start: 2017-11-23 | End: 2017-11-24 | Stop reason: HOSPADM

## 2017-11-23 RX ORDER — VERAPAMIL HYDROCHLORIDE 2.5 MG/ML
INJECTION, SOLUTION INTRAVENOUS
Status: COMPLETED
Start: 2017-11-23 | End: 2017-11-23

## 2017-11-23 RX ORDER — MIDAZOLAM HYDROCHLORIDE 1 MG/ML
INJECTION INTRAMUSCULAR; INTRAVENOUS
Status: COMPLETED
Start: 2017-11-23 | End: 2017-11-23

## 2017-11-23 RX ORDER — SODIUM CHLORIDE 9 MG/ML
INJECTION, SOLUTION INTRAVENOUS CONTINUOUS
Status: DISCONTINUED | OUTPATIENT
Start: 2017-11-23 | End: 2017-11-24

## 2017-11-23 RX ORDER — ONDANSETRON 2 MG/ML
4 INJECTION INTRAMUSCULAR; INTRAVENOUS EVERY 4 HOURS PRN
Status: DISCONTINUED | OUTPATIENT
Start: 2017-11-23 | End: 2017-11-23

## 2017-11-23 RX ORDER — HEPARIN SODIUM,PORCINE 1000/ML
VIAL (ML) INJECTION
Status: COMPLETED
Start: 2017-11-23 | End: 2017-11-23

## 2017-11-23 RX ORDER — DIPHENHYDRAMINE HYDROCHLORIDE 50 MG/ML
25 INJECTION INTRAMUSCULAR; INTRAVENOUS EVERY 6 HOURS PRN
Status: DISCONTINUED | OUTPATIENT
Start: 2017-11-23 | End: 2017-11-24 | Stop reason: HOSPADM

## 2017-11-23 RX ORDER — ADENOSINE 3 MG/ML
INJECTION, SOLUTION INTRAVENOUS
Status: COMPLETED
Start: 2017-11-23 | End: 2017-11-23

## 2017-11-23 RX ORDER — SPIRONOLACTONE 25 MG/1
12.5 TABLET ORAL
Status: DISCONTINUED | OUTPATIENT
Start: 2017-11-23 | End: 2017-11-24 | Stop reason: HOSPADM

## 2017-11-23 RX ORDER — POTASSIUM CHLORIDE 20 MEQ/1
20 TABLET, EXTENDED RELEASE ORAL DAILY
Status: DISCONTINUED | OUTPATIENT
Start: 2017-11-24 | End: 2017-11-24 | Stop reason: HOSPADM

## 2017-11-23 RX ORDER — FUROSEMIDE 10 MG/ML
40 INJECTION INTRAMUSCULAR; INTRAVENOUS ONCE
Status: COMPLETED | OUTPATIENT
Start: 2017-11-23 | End: 2017-11-23

## 2017-11-23 RX ADMIN — HEPARIN SODIUM 2550 UNITS/HR: 5000 INJECTION, SOLUTION INTRAVENOUS at 03:05

## 2017-11-23 RX ADMIN — HYDRALAZINE HYDROCHLORIDE 25 MG: 25 TABLET, FILM COATED ORAL at 13:38

## 2017-11-23 RX ADMIN — HYDRALAZINE HYDROCHLORIDE 25 MG: 25 TABLET, FILM COATED ORAL at 06:08

## 2017-11-23 RX ADMIN — ASPIRIN 325 MG: 325 TABLET, COATED ORAL at 08:02

## 2017-11-23 RX ADMIN — LISINOPRIL 10 MG: 5 TABLET ORAL at 08:02

## 2017-11-23 RX ADMIN — MIDAZOLAM 2 MG: 1 INJECTION INTRAMUSCULAR; INTRAVENOUS at 12:34

## 2017-11-23 RX ADMIN — SODIUM CHLORIDE: 9 INJECTION, SOLUTION INTRAVENOUS at 10:16

## 2017-11-23 RX ADMIN — ATORVASTATIN CALCIUM 40 MG: 40 TABLET, FILM COATED ORAL at 19:49

## 2017-11-23 RX ADMIN — MIDAZOLAM 1.5 MG: 1 INJECTION INTRAMUSCULAR; INTRAVENOUS at 12:45

## 2017-11-23 RX ADMIN — METOPROLOL TARTRATE 25 MG: 25 TABLET, FILM COATED ORAL at 08:02

## 2017-11-23 RX ADMIN — FUROSEMIDE 40 MG: 10 INJECTION, SOLUTION INTRAMUSCULAR; INTRAVENOUS at 08:02

## 2017-11-23 RX ADMIN — NITROGLYCERIN 10 ML: 20 INJECTION INTRAVENOUS at 12:34

## 2017-11-23 RX ADMIN — ADENOSINE 90 MG: 3 INJECTION, SOLUTION INTRAVENOUS at 12:51

## 2017-11-23 RX ADMIN — SPIRONOLACTONE 12.5 MG: 25 TABLET ORAL at 13:38

## 2017-11-23 RX ADMIN — POTASSIUM CHLORIDE 10 MEQ: 1500 TABLET, EXTENDED RELEASE ORAL at 09:00

## 2017-11-23 RX ADMIN — FENTANYL CITRATE 75 MCG: 50 INJECTION, SOLUTION INTRAMUSCULAR; INTRAVENOUS at 12:34

## 2017-11-23 RX ADMIN — ALBUTEROL SULFATE 2 PUFF: 90 AEROSOL, METERED RESPIRATORY (INHALATION) at 00:06

## 2017-11-23 RX ADMIN — ALBUTEROL SULFATE 2 PUFF: 90 AEROSOL, METERED RESPIRATORY (INHALATION) at 21:39

## 2017-11-23 RX ADMIN — INSULIN HUMAN 2 UNITS: 100 INJECTION, SOLUTION PARENTERAL at 17:10

## 2017-11-23 RX ADMIN — IOHEXOL 194 ML: 350 INJECTION, SOLUTION INTRAVENOUS at 13:02

## 2017-11-23 RX ADMIN — METOPROLOL TARTRATE 25 MG: 25 TABLET, FILM COATED ORAL at 19:49

## 2017-11-23 RX ADMIN — HEPARIN SODIUM: 1000 INJECTION, SOLUTION INTRAVENOUS; SUBCUTANEOUS at 12:36

## 2017-11-23 RX ADMIN — FUROSEMIDE 40 MG: 10 INJECTION, SOLUTION INTRAMUSCULAR; INTRAVENOUS at 13:38

## 2017-11-23 RX ADMIN — VERAPAMIL HYDROCHLORIDE 2.5 MG: 2.5 INJECTION, SOLUTION INTRAVENOUS at 12:35

## 2017-11-23 RX ADMIN — LIDOCAINE HYDROCHLORIDE: 20 INJECTION, SOLUTION INFILTRATION; PERINEURAL at 12:35

## 2017-11-23 RX ADMIN — HEPARIN SODIUM 2000 UNITS: 200 INJECTION, SOLUTION INTRAVENOUS at 12:35

## 2017-11-23 RX ADMIN — HYDRALAZINE HYDROCHLORIDE 25 MG: 25 TABLET, FILM COATED ORAL at 20:03

## 2017-11-23 RX ADMIN — HEPARIN SODIUM 25000 UNITS: 5000 INJECTION, SOLUTION INTRAVENOUS at 14:42

## 2017-11-23 RX ADMIN — IPRATROPIUM BROMIDE AND ALBUTEROL SULFATE 3 ML: .5; 3 SOLUTION RESPIRATORY (INHALATION) at 08:05

## 2017-11-23 RX ADMIN — HEPARIN SODIUM 2550 UNITS/HR: 5000 INJECTION, SOLUTION INTRAVENOUS at 23:51

## 2017-11-23 RX ADMIN — PREDNISONE 20 MG: 20 TABLET ORAL at 08:02

## 2017-11-23 ASSESSMENT — PAIN SCALES - GENERAL
PAINLEVEL_OUTOF10: 0

## 2017-11-23 ASSESSMENT — ENCOUNTER SYMPTOMS
SHORTNESS OF BREATH: 1
HEADACHES: 0
CHILLS: 0
BACK PAIN: 0
ABDOMINAL PAIN: 0
DIZZINESS: 0
COUGH: 1
DIARRHEA: 0
LOSS OF CONSCIOUSNESS: 0
NAUSEA: 0
FEVER: 0
VOMITING: 0

## 2017-11-23 NOTE — PROGRESS NOTES
Pt back from cath lab.  Tele box on.  Radial site intact.  CT surgery called, pt understands need for this.  Post procedure vitals in place.

## 2017-11-23 NOTE — PROGRESS NOTES
Diabetes education: Met with Joshua and given One touch verio meter and instructed on use. Pt given handouts to cover areas discussed earlier.

## 2017-11-23 NOTE — CARE PLAN
Problem: Safety  Goal: Will remain free from falls  Outcome: PROGRESSING AS EXPECTED  Jossie Wolf Fall Risk Assessment:     Last Known Fall: No falls  Mobility: Dizziness/generalized weakness  Medications: Diuretics, Cardiovascular or central nervous system meds  Mental Status/LOC/Awareness: Awake, alert, and oriented to date, place, and person  Toileting Needs: Use of assistive device (Bedside commode, bedpan, urinal)  Volume/Electrolyte Status: Use of IV fluids/tube feeds  Communication/Sensory: No deficits  Behavior: Appropriate behavior  Jossie Wolf Fall Risk Total: 11  Fall Risk Level: MODERATE RISK    Universal Fall Precautions:  call light/belongings in reach, bed in low position and locked, siderails up x 2, adequate lighting, educate on level of risk, educate to call for assistance    Fall Risk Level Interventions:    TRIAL (TELE 8, NEURO, MED KURT 5) Moderate Fall Risk Interventions  Place yellow fall risk ID band on patient: verified  Provide patient/family education based on risk assessment : completed  Educate patient/family to call staff for assistance when getting out of bed: completed  Place fall precaution signage outside patient door: verified  Utilize bed/chair fall alarm: refused     Patient Specific Interventions:     Medication: review medications with patient and family  Mental Status/LOC/Awareness: reinforce falls education, check on patient hourly and reinforce the use of call light  Toileting: provide frquent toileting  Volume/Electrolyte Status: monitor abnormal lab values and ensure IV fluids are appropriate  Communication/Sensory: update plan of care on whiteboard  Behavioral: engage patient in daily activities and administer medication as ordered  Mobility: utilize bed/chair fall alarm, ensure bed is locked and in lowest position, provide appropriate assistive device and instruct patient to exit bed on their strongest side    Problem: Fluid Volume:  Goal: Risk for excess fluid  volume will decrease  Outcome: PROGRESSING AS EXPECTED  Strict I&Os in place, daily weights recorded, pt. Has BLLE 2+ pitting edema, assessed and documented

## 2017-11-23 NOTE — PROCEDURES
DATE OF SERVICE:  11/23/2017    PROCEDURES:  1.  Selective coronary angiography.  2.  Fractional flow reserve of the LAD.  3.  Left heart catheterization.  4.  Left ventriculography.    INDICATIONS:  The patient is a 51-year-old gentleman admitted to the hospital   with new onset of congestive heart failure and evidence of a non-STEMI   myocardial infarction.  He is undergoing angiography at this time to determine   the extent of his coronary artery disease and the need for possible   intervention.    DESCRIPTION OF PROCEDURE:  The right wrist was sterilely prepped and draped in   usual fashion.  The area of the right radial artery was anesthetized with 2%   lidocaine and conscious sedation was obtained using initially 1.5 mg of Versed   and 50 mcg of fentanyl.  He received additional Versed and fentanyl during   the procedure.  Please refer to the nurses' notes for dosages and timing.    The right radial artery was easily entered and a 6-[place] sheath was placed.    An ACT was drawn as the patient was on a heparin infusion.  He was given 100   mcg of intra-arterial nitroglycerin, 2.5 mg of intra-arterial verapamil, and   then 3000 units of intravenous heparin.  A 5-[place] iQiyi catheter was   advanced into the ostium of the left main coronary artery where selective   angiograms were performed.  This catheter was then manipulated into the ostium   of the right coronary artery where selective angiograms were again performed.    Following this, a pigtail catheter was exchanged and a left heart   catheterization was performed and this was followed by left ventriculogram   using 30 mL of contrast.  A left heart pullback was performed.  The angiograms   were reviewed and an FFR of the LAD was performed.  An EBU guide was placed.    An ACT was again checked.  The patient was given additional 3000 units of   heparin based on the ACT and an FFR wire was placed across the lesion in the   LAD and into the distal LAD.  FFR was  found to be 0.5 prior to adenosine.    Therefore, adenosine was not given and a slow pullback of the FFR wire under   fluoroscopy demonstrated that there was abrupt returned to the normal ratio in   the proximal LAD.  There was no evidence of any pressure drift with the wire.    It was felt that the FFR was indeed accurate.  The guiding catheter was   removed.  Additional 100 mcg of nitroglycerin was given through the sheath.    The sheath was removed and hemostasis was obtained by direct compression of   the artery.  There were no complications.    ESTIMATED BLOOD LOSS:  15 mL    FLUOROSCOPY TIME:  6.3 minutes.    X-RAY DOSE-AREA PRODUCT:  10,893.    TOTAL CONTRAST MEDIA:  194 mL of Omnipaque 350.    HEMODYNAMICS:  Revealed sinus rhythm with PVCs.  Aortic pressure 94/66 mmHg.    LV pressure 115/32 mmHg.  LV pullback does not demonstrate a gradient.    Fluoroscopy of the heart is unremarkable.    The right coronary artery is a dominant vessel and is completely occluded   midway between the origin and the acute margin.    The left main has an eccentric 20-25% stenosis and bifurcates into the LAD and   circumflex.  The LAD has a 40-50% proximal lesion and then gives rise to   small first diagonal artery.  Just beyond this diagonal artery, the LAD has   another stenosis of approximately 40% and then there is evidence of a linear   spontaneous dissection within the LAD and the mid LAD arises a larger second   diagonal artery.  Distally, the LAD terminates at the LV apex, but forms a   large collateral to the PDA of the right coronary artery.    The circumflex is approximately 4 mm in diameter and gives rise to a large   atrial branch proximally.  In the mid vessel arises a moderate sized first   marginal artery and immediately distal to this, there is a high-grade stenosis   in the circumflex measuring at least 80%.  Beyond this, arises a large   caliber second marginal artery that has a 60% stenosis in its proximal    segment.  The circumflex then terminates into a small to moderate third   marginal artery and then a small first marginal artery.    The left ventriculogram demonstrates left ventricular enlargement with   hypokinesis of the anterior wall as well as the inferior wall.  Calculated   ejection fraction is 38%.    IMPRESSION:  1.  Coronary artery disease with complete occlusion of the proximal right   coronary artery with left to right-sided collaterals.  2.  An 80% or greater lesion in the proximal circumflex.  3.  Eccentric 40% lesion in the proximal LAD followed by a spontaneous LAD   dissection.  FFR with probe in the distal LAD reveals FFR of 0.5 prior to   adenosine administration.  4.  Elevated left ventricular end diastolic pressure of 32.  5.  Hypokinesis of the anterior and inferior walls of the left ventricle.    Ejection fraction calculated at 38%.       ____________________________________     MINESH FLORES MD    RPS / NTS    DD:  11/23/2017 13:47:22  DT:  11/23/2017 14:55:13    D#:  [id number]  Job#:  [id number]    cc: MARITZA GARCIA MD

## 2017-11-23 NOTE — CARE PLAN
Problem: Infection  Goal: Will remain free from infection  No signs of infection at this time.    Problem: Venous Thromboembolism (VTW)/Deep Vein Thrombosis (DVT) Prevention:  Goal: Patient will participate in Venous Thrombosis (VTE)/Deep Vein Thrombosis (DVT)Prevention Measures  Pt is on heparin drip at this time for anticoagulation.

## 2017-11-23 NOTE — PROGRESS NOTES
[facility] Hospitalist Progress Note    Date of Service: 2017    Chief Complaint  51 y.o. male admitted 2017 with 3 days of SOB and L leg edema.  On admission CXR showing pulmonary edema, Trops elevated (NSTEMI), thought to be having AECOPD/CAP as well    Interval Problem Update    No chest pain    Afebrile    Left heart cath planned for today    Ef 35%    Low potassium 3.5    Still has leg edema        Consultants/Specialty  Cardiology    Disposition  pending        Review of Systems   Constitutional: Positive for malaise/fatigue. Negative for chills and fever.   Respiratory: Positive for cough and shortness of breath.    Cardiovascular: Positive for leg swelling. Negative for chest pain.   Gastrointestinal: Negative for abdominal pain, diarrhea, nausea and vomiting.   Musculoskeletal: Negative for back pain.   Skin: Negative for rash.   Neurological: Negative for dizziness, loss of consciousness and headaches.   All other systems reviewed and are negative.     Physical Exam  Laboratory/Imaging   Hemodynamics  Temp (24hrs), Av.6 °C (97.9 °F), Min:36.4 °C (97.6 °F), Max:36.7 °C (98.1 °F)   Temperature: 36.4 °C (97.6 °F)  Pulse  Av.7  Min: 63  Max: 93    Blood Pressure: 115/77      Respiratory      Respiration: 16, Pulse Oximetry: 95 %, O2 Daily Delivery Respiratory : Room Air with O2 Available     Given By:: Mouthpiece, Work Of Breathing / Effort: Mild  RUL Breath Sounds: Diminished, RML Breath Sounds: Diminished, RLL Breath Sounds: Diminished, GENNARO Breath Sounds: Diminished, LLL Breath Sounds: Diminished    Fluids    Intake/Output Summary (Last 24 hours) at 17 0914  Last data filed at 17 0700   Gross per 24 hour   Intake             1270 ml   Output             1700 ml   Net             -430 ml       Nutrition  Orders Placed This Encounter   Procedures   • Diet NPO after Midnight     Standing Status:   Standing     Number of Occurrences:   8     Order Specific Question:   Restrict to:      Answer:   Sips with Medications [3]     Physical Exam   Constitutional: He is oriented to person, place, and time. He appears well-developed and well-nourished. No distress.   morbid obese   HENT:   Head: Normocephalic and atraumatic.   Eyes: Conjunctivae are normal.   Neck: No JVD present.   Cardiovascular: Normal rate.  Exam reveals no gallop.    No murmur heard.  Pulmonary/Chest: Effort normal. No stridor. No respiratory distress. He has no wheezes. He has no rales.   Decreased bs at bases   Abdominal: Soft. There is no tenderness. There is no rebound and no guarding.   Musculoskeletal: He exhibits edema.   Neurological: He is oriented to person, place, and time.   Skin: Skin is warm and dry. No rash noted. He is not diaphoretic.   Psychiatric: He has a normal mood and affect. Thought content normal.   Nursing note and vitals reviewed.      Recent Labs      11/22/17 2229 11/23/17   0456   WBC  15.7*  14.5*   RBC  5.95  5.75   HEMOGLOBIN  14.9  14.5   HEMATOCRIT  48.2  46.7   MCV  81.0*  81.2*   MCH  25.0*  25.2*   MCHC  30.9*  31.0*   RDW  59.2*  59.3*   PLATELETCT  284  273   MPV  10.5  10.7     Recent Labs      11/22/17   1303  11/22/17   2230  11/23/17   0456   SODIUM  133*  132*  136   POTASSIUM  4.5  3.7  3.5*   CHLORIDE  96  101  99   CO2  21  22  25   GLUCOSE  171*  138*  96   BUN  23*  24*  22   CREATININE  1.24  1.22  1.11   CALCIUM  9.3  9.1  9.0     Recent Labs      11/22/17   1503  11/22/17   2229  11/23/17   0456   APTT  42.8*  60.0*  75.9*   INR   --   1.17*   --                   Assessment/Plan     * NSTEMI (non-ST elevated myocardial infarction) (CMS-HCC)- (present on admission)   Assessment & Plan    Cards following  Plan [facility]  On heparin gtts  Cont statin, BB, ASA  Pt is CP free at present        COPD exacerbation (CMS-HCC)- (present on admission)   Assessment & Plan    Improved exam and complaint  Likely cardiac induced bronchospasm  Prednisone 20mg po daily for 5 days  Cont BD's and O2  support        Acute systolic CHF (congestive heart failure) (CMS-HCC) ef 35%   Assessment & Plan    Diuresis    b blocker  ace I        Type 2 diabetes mellitus (CMS-HCC)   Assessment & Plan    New Dx  Diabetic educator consult  Diabetic diet  Cont SSI for now  A1c 6.9  Hope to dc on po meds but will hold for now given pending cath        Acute pulmonary edema (CMS-HCC)- (present on admission)   Assessment & Plan    Diuresis as noted  Follow daily BMP and I&Os        Cardiomyopathy (CMS-HCC)- (present on admission)   Assessment & Plan    ?ischemic  Cards following  LHC planned  On BB, statin, lasix, ASA          Pneumonia- (present on admission)   Assessment & Plan    Doxy            Reviewed items::  EKG reviewed, Radiology images reviewed, Labs reviewed and Medications reviewed  Ceballos catheter::  No Ceballos  DVT prophylaxis pharmacological::  Heparin  DVT prophylaxis - mechanical:  SCDs  Ulcer Prophylaxis::  Not indicated  Antibiotics:  Treating active infection/contamination beyond 24 hours perioperative coverage          Check am  bmp, bnp

## 2017-11-23 NOTE — CATH LAB
Immediate Post-Operative Note      PreOp Diagnosis: nobn STEMI    PostOp Diagnosis: same    Procedure(s) :  Coronary Angiography, Left Heart Catheterization, Left Ventriculography.  FFR of LAD  Surgeon(s):  Kali Loya M.D.    Type of Anesthesia: Moderate Sedation    Specimen: None    Estimated Blood Loss: 15 cc's    Contrast Media:  194 cc's    Fluoro Time: 6.3 min    Xray DAP: 96986    Findings: 100% prox RCA with good collateral filling, 80-90% mid Circ.  40% prox LAD followed by linear spontaneous dissection with FFR of LAD .5 before adenosine.  Poor LV funciton    Complications: none      Kali Loya M.D.  11/23/2017 1:19 PM

## 2017-11-23 NOTE — FLOWSHEET NOTE
11/22/17 1958   Events/Summary/Plan   Events/Summary/Plan SVN   SVN Group   #SVN Performed Yes   Given By: Mouthpiece   Respiratory WDL   Respiratory (WDL) X   Chest Exam   Work Of Breathing / Effort Mild   Respiration 18   Pulse 77   Breath Sounds   Pre/Post Intervention Pre Intervention Assessment   RUL Breath Sounds Diminished   RML Breath Sounds Diminished   RLL Breath Sounds Diminished   GENNARO Breath Sounds Diminished   LLL Breath Sounds Diminished   Oximetry   #Pulse Oximetry (Single Determination) Yes   Oxygen   Pulse Oximetry 90 %   O2 (LPM) 3   O2 Daily Delivery Respiratory  Room Air with O2 Available

## 2017-11-23 NOTE — PROGRESS NOTES
Jossie Wolf Fall Risk Assessment:     Last Known Fall: No falls  Mobility: Dizziness/generalized weakness  Medications: Cardiovascular or central nervous system meds, Diuretics  Mental Status/LOC/Awareness: Awake, alert, and oriented to date, place, and person  Toileting Needs: Use of assistive device (Bedside commode, bedpan, urinal)  Volume/Electrolyte Status: Use of IV fluids/tube feeds  Communication/Sensory: No deficits  Behavior: Appropriate behavior  Jossie Wolf Fall Risk Total: 11  Fall Risk Level: MODERATE RISK    Universal Fall Precautions:  call light/belongings in reach, bed in low position and locked, wheelchairs and assistive devices out of sight, siderails up x 2, use non-slip footwear, adequate lighting, clutter free and spill free environment    Fall Risk Level Interventions:    TRIAL (TELE 8, NEURO, MED KURT 5) Moderate Fall Risk Interventions  Place yellow fall risk ID band on patient: verified  Provide patient/family education based on risk assessment : completed  Educate patient/family to call staff for assistance when getting out of bed: completed  Place fall precaution signage outside patient door: verified  Utilize bed/chair fall alarm: refused     Patient Specific Interventions:     Medication: review medications with patient and family and provide patients who received diuretics/laxatives frequent toileting  Mental Status/LOC/Awareness: reinforce the use of call light  Toileting: instruct patient/family on the use of grab bars  Volume/Electrolyte Status: ensure patient remains hydrated and ensure IV fluids are appropriate  Communication/Sensory: update plan of care on whiteboard and ensure proper positioning when transferrng/ambulating  Behavioral: encourage patient to voice feelings and engage patient in daily activities  Mobility: dangle prior to standing, ensure bed is locked and in lowest position and instruct patient to exit bed on their strongest side

## 2017-11-23 NOTE — PROGRESS NOTES
Assumed care of pt.  Assessment complete.  No signs of distress.  Pt denies any pain.  Discussed plan of care, pt to have cath today.  Call light within reach.  Bed alarm active.  Treaded socks on pt.  Will continue to monitor.

## 2017-11-23 NOTE — PROGRESS NOTES
Monitor Summary   0.20 / 0.08 / 0.40  SR 63-80  (R) PVC's, (R) Triplet, 4 beats of Idioventricular

## 2017-11-24 VITALS
HEART RATE: 67 BPM | WEIGHT: 315 LBS | BODY MASS INDEX: 37.19 KG/M2 | HEIGHT: 77 IN | RESPIRATION RATE: 18 BRPM | OXYGEN SATURATION: 91 % | SYSTOLIC BLOOD PRESSURE: 121 MMHG | TEMPERATURE: 97.9 F | DIASTOLIC BLOOD PRESSURE: 82 MMHG

## 2017-11-24 LAB
ANION GAP SERPL CALC-SCNC: 9 MMOL/L (ref 0–11.9)
APTT PPP: 59.8 SEC (ref 24.7–36)
BACTERIA BLD CULT: NORMAL
BNP SERPL-MCNC: 787 PG/ML (ref 0–100)
BUN SERPL-MCNC: 26 MG/DL (ref 8–22)
CALCIUM SERPL-MCNC: 9.1 MG/DL (ref 8.5–10.5)
CHLORIDE SERPL-SCNC: 100 MMOL/L (ref 96–112)
CO2 SERPL-SCNC: 25 MMOL/L (ref 20–33)
CREAT SERPL-MCNC: 1.5 MG/DL (ref 0.5–1.4)
EKG IMPRESSION: NORMAL
GFR SERPL CREATININE-BSD FRML MDRD: 49 ML/MIN/1.73 M 2
GLUCOSE BLD-MCNC: 127 MG/DL (ref 65–99)
GLUCOSE BLD-MCNC: 93 MG/DL (ref 65–99)
GLUCOSE SERPL-MCNC: 114 MG/DL (ref 65–99)
POTASSIUM SERPL-SCNC: 3.6 MMOL/L (ref 3.6–5.5)
SIGNIFICANT IND 70042: NORMAL
SITE SITE: NORMAL
SODIUM SERPL-SCNC: 134 MMOL/L (ref 135–145)
SOURCE SOURCE: NORMAL

## 2017-11-24 PROCEDURE — 99239 HOSP IP/OBS DSCHRG MGMT >30: CPT | Performed by: HOSPITALIST

## 2017-11-24 PROCEDURE — 700102 HCHG RX REV CODE 250 W/ 637 OVERRIDE(OP): Performed by: HOSPITALIST

## 2017-11-24 PROCEDURE — 700111 HCHG RX REV CODE 636 W/ 250 OVERRIDE (IP): Performed by: HOSPITALIST

## 2017-11-24 PROCEDURE — 85730 THROMBOPLASTIN TIME PARTIAL: CPT

## 2017-11-24 PROCEDURE — A9270 NON-COVERED ITEM OR SERVICE: HCPCS | Performed by: HOSPITALIST

## 2017-11-24 PROCEDURE — 700102 HCHG RX REV CODE 250 W/ 637 OVERRIDE(OP): Performed by: INTERNAL MEDICINE

## 2017-11-24 PROCEDURE — 83880 ASSAY OF NATRIURETIC PEPTIDE: CPT

## 2017-11-24 PROCEDURE — 36415 COLL VENOUS BLD VENIPUNCTURE: CPT

## 2017-11-24 PROCEDURE — A9270 NON-COVERED ITEM OR SERVICE: HCPCS | Performed by: INTERNAL MEDICINE

## 2017-11-24 PROCEDURE — 80048 BASIC METABOLIC PNL TOTAL CA: CPT

## 2017-11-24 PROCEDURE — 82962 GLUCOSE BLOOD TEST: CPT

## 2017-11-24 RX ADMIN — SPIRONOLACTONE 12.5 MG: 25 TABLET ORAL at 08:20

## 2017-11-24 RX ADMIN — ASPIRIN 325 MG: 325 TABLET, COATED ORAL at 08:19

## 2017-11-24 RX ADMIN — PREDNISONE 20 MG: 20 TABLET ORAL at 08:19

## 2017-11-24 RX ADMIN — METOPROLOL TARTRATE 25 MG: 25 TABLET, FILM COATED ORAL at 08:19

## 2017-11-24 RX ADMIN — POTASSIUM CHLORIDE 20 MEQ: 1500 TABLET, EXTENDED RELEASE ORAL at 08:21

## 2017-11-24 RX ADMIN — HEPARIN SODIUM 2550 UNITS/HR: 5000 INJECTION, SOLUTION INTRAVENOUS at 09:50

## 2017-11-24 RX ADMIN — ALBUTEROL SULFATE 2 PUFF: 90 AEROSOL, METERED RESPIRATORY (INHALATION) at 02:55

## 2017-11-24 ASSESSMENT — PAIN SCALES - GENERAL
PAINLEVEL_OUTOF10: 0
PAINLEVEL_OUTOF10: 0

## 2017-11-24 ASSESSMENT — ENCOUNTER SYMPTOMS
PND: 0
COUGH: 0
SHORTNESS OF BREATH: 0
ORTHOPNEA: 0
PALPITATIONS: 0
CLAUDICATION: 0

## 2017-11-24 NOTE — PROGRESS NOTES
Dr. Escalante in to see pt. Dr. Escalante asking for a special refusal of care to be drawn up with case management, so that pt can leave the floor, but refuse staff supervision. Called Risk Management, message left.

## 2017-11-24 NOTE — PROGRESS NOTES
Received report at 1900 at bedside. Pt A&O x 4 no C/O  pain, . Completed assessment. POC discussed with patient. Bed alarm in use, belongings and call light within reach.

## 2017-11-24 NOTE — PROGRESS NOTES
Jossie Wolf Fall Risk Assessment:     Last Known Fall: No falls  Mobility: No limitations  Medications: Cardiovascular or central nervous system meds, Diuretics  Mental Status/LOC/Awareness: Awake, alert, and oriented to date, place, and person  Toileting Needs: Use of assistive device (Bedside commode, bedpan, urinal)  Volume/Electrolyte Status: Use of IV fluids/tube feeds  Communication/Sensory: No deficits  Behavior: Appropriate behavior  Jossie Wolf Fall Risk Total: 10  Fall Risk Level: LOW RISK    Universal Fall Precautions:  call light/belongings in reach, bed in low position and locked, siderails up x 2, wheelchairs and assistive devices out of sight, use non-slip footwear, adequate lighting, clutter free and spill free environment, educate on level of risk, educate to call for assistance    Fall Risk Level Interventions:   TRIAL ([employer] NEURO, MED KURT 5) Low Fall Risk Interventions  Place yellow fall risk ID band on patient: verified  Provide patient/family education based on risk assessment: verified  Educate patient/family to call staff for assistance when getting out of bed: verified  Place fall precaution signage outside patient door: verifiedTRIAL ([employer], NEURO, MED KURT 5) Moderate Fall Risk Interventions  Place yellow fall risk ID band on patient: verified  Provide patient/family education based on risk assessment : completed  Educate patient/family to call staff for assistance when getting out of bed: completed  Place fall precaution signage outside patient door: verified  Utilize bed/chair fall alarm: refused     Patient Specific Interventions:     Medication: review medications with patient and family, assess for medications that can be discontinued or dosage decreased and limit combination of prn medications  Mental Status/LOC/Awareness: check on patient hourly, reinforce the use of call light and provide activity  Toileting: provide frquent toileting, instruct patient/family on the use  of grab bars and instruct patient/family on the need to call for assistance when toileting  Volume/Electrolyte Status: advance diet as tolerated, monitor abnormal lab values and ensure IV fluids are appropriate  Communication/Sensory: collaborate with doctor for possible speech therapy consult and ensure proper positioning when transferrng/ambulating  Behavioral: instruct/reinforce fall program rationale and use appropriate de-escalation techniques  Mobility: utilize bed/chair fall alarm and ensure bed is locked and in lowest position

## 2017-11-24 NOTE — CONSULTS
DATE OF CONSULTATION: 2017     REFERRING PHYSICIAN: Kali Loya MD.     CONSULTING PHYSICIAN: Kiel sAh MD     CHIEF COMPLAINT: Shortness of breath.     HISTORY OF PRESENT ILLNESS: Mr. Medrano is a pleasant 51 year old gentleman with a history of COPD and pneumonia who developed shortness of breath on 2017.  He felt is was like the time he had pneumonia.  It has been ongoing for 4 days prior to admission.  He denied fever, chills, diarrhea and nausea/vomiting.  He denied any chest pain.  He does have chronic lower extremity edema.  Because of this and his history of smoking troponins were drawn in the ED which were positive.  He was admitted with ACS and non STEMI.  He was cathed yesterday and found to have  CAD.  He was referred to me for surgical consultation.  Again he denies any chest pain.  His only symptom was shortness of breath.      PAST MEDICAL HISTORY:  has a past medical history of ASTHMA; Chronic obstructive pulmonary disease (CMS-HCC); and Diabetes.  Chronic lower extremity edema, pyelonephritis, chronic right lower extremity ulcers, chronic venous stasis, history of noncompliance.     PAST SURGICAL HISTORY: umbilical hernia repair.      ALLERGIES:   Allergies   Allergen Reactions   • Cillins [Penicillins] Rash     As a child   • Erythromycin Hives   • Shellfish Allergy Rash     Pt stated that he is allergic to all seafood, will develop a rash and says that rash will clear up with benadryl        CURRENT MEDICATIONS:   Home Medications     Reviewed by Vinny Schulz (Pharmacy Tech) on 17 at 1835  Med List Status: Complete   Medication Last Dose Status   albuterol (PROVENTIL) 2.5mg/0.5ml Nebu Soln 2017 Active                FAMILY HISTORY:  Brother  fatty chrosis, one  pancreatic cancer and a brother  of MI at 54.  Father  of heart disease in mid fifties.     SOCIAL HISTORY:   Social History     Social History Main Topics   • Smoking status: Current  Every Day Smoker     Packs/day: 0.50     Years: 20.00     Types: Cigarettes   • Smokeless tobacco: Never Used   • Alcohol use No   • Drug use: No   • Sexual activity: Not on file       REVIEW OF SYSTEMS: Positives for SOB, negative for chest pain, syncope, nausea, vomiting or dizziness  all other review of systems reviewed with the patient are negative.     PHYSICAL EXAMINATION:     General Appearance:  Adult male in no apparent distress.    HEENT:  Atraumatic, normocephalic. Oropharynx is moist.  Neck:  Supple without lymphadenopathy. No carotid bruits.   Cardiovascular:  Normal rate, regular rhythm. Normal S1 and S2.  No murmurs, rubs, or gallops.   Pulmonary:  Air entry is adequate bilaterally. No respiratory distress.  Abdomen:  Soft non-tender, non-distended.  Bowel sounds present all 4 quadrants  Extremities:   No clubbing, cyanosis.  He dose have bilateral lower extremity edema.    Neurologic: Alert and oriented x3.   Integumentary:  No dermatitis noted.  There are changes consistent with peripheral vascular disease. Status right ankle ulcer.  Vericose veins.   Psych:  Affect normal. Patient answers questions appropriately    LABORATORY VALUES:   Recent Labs      11/22/17 2229 11/23/17 0456   WBC  15.7*  14.5*   RBC  5.95  5.75   HEMOGLOBIN  14.9  14.5   HEMATOCRIT  48.2  46.7   MCV  81.0*  81.2*   MCH  25.0*  25.2*   MCHC  30.9*  31.0*   RDW  59.2*  59.3*   PLATELETCT  284  273   MPV  10.5  10.7     Recent Labs      11/22/17 2230 11/23/17 0456  11/24/17   0245   SODIUM  132*  136  134*   POTASSIUM  3.7  3.5*  3.6   CHLORIDE  101  99  100   CO2  22  25  25   GLUCOSE  138*  96  114*   BUN  24*  22  26*   CREATININE  1.22  1.11  1.50*   CALCIUM  9.1  9.0  9.1     Recent Labs      11/22/17 2229   INR  1.17*     Recent Labs      11/22/17 2229 11/23/17 0456  11/24/17   0245   APTT  60.0*  75.9*  59.8*   INR  1.17*   --    --      Angiogram:  I have reviewed the angiogram and he has  RCA, 80%  proximal circumflex, 40% lesion in the proximal LAD with FFR 0.5 and an EF of 38%.     IMPRESSION AND PLAN:    A 51 year old male with depressed left ventricular function EF 35% and coronary artery disease.  He came in volume overload with lower extremity edema and is in need of diuresing.  His weight is above his admit weight.  I would also like to see his WBC normalize prior to surgery.  Plan is to do on Tuesday.  He has been pain free.  Should he develop any pain prior to Tuesday then I will take him sooner.     I discussed the risks of the operation, and quoted him a 5% risk inlcuding perioperative stroke, myocardial infarction, sternal infection, dehiscence, ulnar neuropathy, paresis, phrenic nerve paralysis, possible transfusions and its attendant risks.  At the present time, the patient is willing to proceed with surgery.  Findings and recommendations were discussed with the patient’s cardiologist.  Thank you for this challenging consultation and participation in the patient’s care.  We will keep you apprised of all future developments.  STS risks were discussed with the patient.        ____________________________________   Kiel Ash MD

## 2017-11-24 NOTE — PROGRESS NOTES
"Pt wants to leave floor. Pt will not allow a nurse to go with him when he leaves the floor, he states: \"I want some fresh air, I don't need any medical professionals with me.\" Pt is on a tele box and on a heparin gtt. Educated pt concerning the risks of leaving the floor unsupervised by staff due to being cardiac monitored and on a blood thinning drip. Pt says: \"I can take care of myself.\" Unreceptive to education and discussion.   "

## 2017-11-24 NOTE — PROGRESS NOTES
Received care of pt from NOC RN this am. Pt is A+O. Denies need for pain medication. Received orders from cardiology, updated pt and his wife concerning physician orders. Educated pt concerning fall risk and heparin gtt, pt states he gets up self and refuses bed alarm. Discussed pt's care with Hospitalist.

## 2017-11-24 NOTE — PROGRESS NOTES
"Discussing leaving the floor supervised by staff. Pt states: \"I have years of history with the law and you can't do this to me.\" Charge RN called to BS. Hospitalist called to bedside. They discuss situation with pt. Dr. Hilliard informs this RN pt leaving AMA. Dr. Hilliard signs pt's AMA sheet. Signed by pt and co-signed by this RN. Educated pt concerning leaving AMA, pt verbalizes understanding. Told to return to ER anytime he wants to be readmitted. Tele and IV removed. Pt and his wife leave floor ambulating, no difficulties, no c/o, no requests. They state they know the way out.    "

## 2017-11-24 NOTE — PROGRESS NOTES
"Updated Dr. Escalante concerning situation with Risk Management. Called [facility]. Discussed Dr. Escalante's request for a refusal of care, informed by NAM that it's not \"black and white\" and not possible to have such a refusal of care.   "

## 2017-11-24 NOTE — PROGRESS NOTES
Cardiology Progress Note               Author: Tushar Peterson Date & Time created: 2017  8:51 AM     Consultation for non-STEMI, troponin peaked at 2.5, abnormal EKG    Admitted with cough and dyspnea ×4 days    History of COPD, asthma, smoker, half a pack a day, undiagnosed diabetes, morbid obesity    Labs reviewed  Sodium, potassium, creatinine 1.5  A1c 6.9  Troponin peaked at 2.5  ---->787        BP =113/86  HR =72 nsr      Echocardiogram 17, LVEF 35%, global hypokinesis, severely dilated right ventricle, reduced RV systolic function, enlarged right atrium, possible low-grade low flow stenosis, RVSP 35 mmHg      Coronary angiography 17 showed 100% proximal RCA with good collateral feelings, 80-90% mid circumflex, 40% proximal LAD followed by linear spontaneous dissection with FFR of LAD 0.5, poor LV function      Review of Systems   Respiratory: Negative for cough and shortness of breath.    Cardiovascular: Positive for leg swelling. Negative for chest pain, palpitations, orthopnea, claudication and PND.        LE edema much improved       Physical Exam   Constitutional: He is oriented to person, place, and time.   HENT:   Head: Normocephalic.   Eyes: Conjunctivae are normal.   Cardiovascular: Normal rate and regular rhythm.    Pulses:       Carotid pulses are 2+ on the right side, and 2+ on the left side.       Radial pulses are 2+ on the right side, and 2+ on the left side.   Pulmonary/Chest: He has no wheezes.   Abdominal: Soft.   Musculoskeletal: He exhibits edema.   Neurological: He is alert and oriented to person, place, and time.   Skin: Skin is warm and dry.       Hemodynamics:  Temp (24hrs), Av.6 °C (97.9 °F), Min:36.3 °C (97.4 °F), Max:37 °C (98.6 °F)  Temperature: 36.5 °C (97.7 °F)  Pulse  Av.9  Min: 63  Max: 93   Blood Pressure: 113/80     Respiratory:    Respiration: 18, Pulse Oximetry: 93 %        RUL Breath Sounds: Diminished, RML Breath Sounds: Diminished, RLL  Breath Sounds: Diminished, GENNARO Breath Sounds: Diminished, LLL Breath Sounds: Diminished  Fluids:       GI/Nutrition:  Orders Placed This Encounter   Procedures   • Diet Order     Standing Status:   Standing     Number of Occurrences:   1     Order Specific Question:   Diet:     Answer:   Cardiac [6]     Lab Results:  Recent Labs      11/22/17 2229 11/23/17 0456   WBC  15.7*  14.5*   RBC  5.95  5.75   HEMOGLOBIN  14.9  14.5   HEMATOCRIT  48.2  46.7   MCV  81.0*  81.2*   MCH  25.0*  25.2*   MCHC  30.9*  31.0*   RDW  59.2*  59.3*   PLATELETCT  284  273   MPV  10.5  10.7     Recent Labs      11/22/17 2230 11/23/17 0456 11/24/17   0245   SODIUM  132*  136  134*   POTASSIUM  3.7  3.5*  3.6   CHLORIDE  101  99  100   CO2  22  25  25   GLUCOSE  138*  96  114*   BUN  24*  22  26*   CREATININE  1.22  1.11  1.50*   CALCIUM  9.1  9.0  9.1     Recent Labs      11/22/17 2229 11/23/17 0456  11/24/17   0245   APTT  60.0*  75.9*  59.8*   INR  1.17*   --    --      Recent Labs      11/24/17   0245   BNPBTYPENAT  787*     Recent Labs      11/24/17   0245   BNPBTYPENAT  787*             Medical Decision Making, by Problem:  Active Hospital Problems    Diagnosis   • NSTEMI (non-ST elevated myocardial infarction) (CMS-HCC) [I21.4]   • COPD exacerbation (CMS-HCC) [J44.1]   • Pneumonia [J18.9]   • Cardiomyopathy (CMS-HCC) [I42.9]   • Acute pulmonary edema (CMS-HCC) [J81.0]       Assessment/Plan:      Awaiting CTS evaluation for recent cath result ( 100% proximal RCA with good collaterals, 80-90% mid circumflex, 40% proximal LAD with spontaneous dissection)    No rhythm issues overnight, normal sinus rhythm with first-degree AV block    Ischemic cardiomyopathy, LVEF 35% , on metoprolol 25 mg BID ( switch to long acting on DC ). ACE on hold, bump in CR today        Acute decompensated systolic and diastolic heart failure, on furosemide 40 IV daily with good diuresis ( stopped today for bump in CR ), goof diuresis, breathing   improved    I/O = -6,000×24 hours, -14,000 since admit       COPD/pneumonia on Rocephin ,doxycycline, prednisone 40 mg ,     Non-STEMI on aspirin 325, Lipitor 40, metoprolol 25 BID, currently on heparin drip, no angina      I/O = -6,000×24 hours, -14,000 since admit                 Reviewed items::  Medications reviewed and Labs reviewed

## 2017-11-25 NOTE — DISCHARGE SUMMARY
CHIEF COMPLAINT ON ADMISSION  Chief Complaint   Patient presents with   • Cough     x 4 days. Productive cough today states white.    • Shortness of Breath       CODE STATUS  Prior    HPI & HOSPITAL COURSE  THISTORY OF PRESENT ILLNESS:  The patient is a 51-year-old male with a history   of COPD and asthma who presented to the emergency room for evaluation of cough   and dyspnea for the past 4 days.  He denies any associated chest pain.  His   cough has been productive of yellow sputum.  It has been associated with fever   2 days ago and chills.  No hemoptysis.  No nausea, vomiting or diarrhea.  He   has a previous history of pneumonia and his symptoms were concerning for   recurrence of pneumonia as they were very similar.  He denies any loss of   consciousness.  He denies any palpitations.  He has chronic lower extremity   edema, which is unchanged.  He denies any diarrhea.  No recent travel or sick   Contacts.    He was seen by cardiology in consultation.he received abx for pneumoni. He received diuresis, oxygen and bronchodilators. Once his lung status improved, he want for angiogram. Angiogram shows significant CAD. cabg was recommended. He was offered nicotine patch but insisted to go out and smoke. He signed out ama, knowing that he could die if he does not get his cardiac situation fixed.    Therefore, he is discharged in guarded and stable condition with close outpatient follow-up.    SPECIFIC OUTPATIENT FOLLOW-UP  pcp 1 week    DISCHARGE PROBLEM LIST  Principal Problem:    NSTEMI (non-ST elevated myocardial infarction) (CMS-HCC) POA: Yes  Active Problems:    COPD exacerbation (CMS-HCC) POA: Yes    Pneumonia POA: Yes    Cardiomyopathy (CMS-HCC) POA: Yes    Acute pulmonary edema (CMS-HCC) POA: Yes    Type 2 diabetes mellitus (CMS-HCC) POA: Unknown    Acute systolic CHF (congestive heart failure) (CMS-HCC) ef 35% POA: Unknown  Resolved Problems:    CVA (cerebral vascular accident) (CMS-HCC) POA:  Unknown      FOLLOW UP  Future Appointments  Date Time Provider Department Center   11/27/2017 10:20 AM CARE MANAGER [facility]   11/30/2017 1:40 PM Zohreh James M.D. [facility]   12/4/2017 9:15 AM Jose E Samuels M.D. [facility] None   12/14/2017 1:40 PM Earnestine Ennis M.D. [facility] None     Pcp Pt States None            MEDICATIONS ON DISCHARGE   Joshua Medrano   Home Medication Instructions ANGELITO:[id number]    Printed on:11/25/17 1311   Medication Information                      albuterol (PROVENTIL) 2.5mg/0.5ml Nebu Soln  2.5 mg by Nebulization route every four hours as needed for Shortness of Breath.                 DIET  No orders of the defined types were placed in this encounter.      ACTIVITY  As tolerated.  Weight bearing as tolerated      CONSULTATIONS  Dr raymond cardiac surgery  Dr washburn cardiology      PROCEDURES  IMPRESSION:  1.  Coronary artery disease with complete occlusion of the proximal right   coronary artery with left to right-sided collaterals.  2.  An 80% or greater lesion in the proximal circumflex.  3.  Eccentric 40% lesion in the proximal LAD followed by a spontaneous LAD   dissection.  FFR with probe in the distal LAD reveals FFR of 0.5 prior to   adenosine administration.  4.  Elevated left ventricular end diastolic pressure of 32.  5.  Hypokinesis of the anterior and inferior walls of the left ventricle.    Ejection fraction calculated at 38%.     LABORATORY  Lab Results   Component Value Date/Time    SODIUM 134 (L) 11/24/2017 02:45 AM    POTASSIUM 3.6 11/24/2017 02:45 AM    CHLORIDE 100 11/24/2017 02:45 AM    CO2 25 11/24/2017 02:45 AM    GLUCOSE 114 (H) 11/24/2017 02:45 AM    BUN 26 (H) 11/24/2017 02:45 AM    CREATININE 1.50 (H) 11/24/2017 02:45 AM        Lab Results   Component Value Date/Time    WBC 14.5 (H) 11/23/2017 04:56 AM    HEMOGLOBIN 14.5 11/23/2017 04:56 AM    HEMATOCRIT 46.7 11/23/2017 04:56 AM    PLATELETCT 273 11/23/2017 04:56 AM        Total time of the discharge process  exceeds 38 minutes

## 2017-11-27 ENCOUNTER — PATIENT OUTREACH (OUTPATIENT)
Dept: HEALTH INFORMATION MANAGEMENT | Facility: OTHER | Age: 52
End: 2017-11-27

## 2017-12-18 NOTE — PROGRESS NOTES
Direct admit from home. Accepted by Dr. Fang for CHF. Scheduled for CABG x3 on 12/22/17  ADT signed & held, needs to be released upon pt arrival.  Written orders received, faxed to unit and scanned to media tab.

## 2017-12-19 ENCOUNTER — HOSPITAL ENCOUNTER (INPATIENT)
Facility: MEDICAL CENTER | Age: 52
LOS: 22 days | DRG: 003 | End: 2018-01-10
Attending: THORACIC SURGERY (CARDIOTHORACIC VASCULAR SURGERY) | Admitting: THORACIC SURGERY (CARDIOTHORACIC VASCULAR SURGERY)
Payer: MEDICARE

## 2017-12-19 DIAGNOSIS — I25.10 CORONARY ARTERY DISEASE INVOLVING NATIVE CORONARY ARTERY OF NATIVE HEART WITHOUT ANGINA PECTORIS: ICD-10-CM

## 2017-12-19 DIAGNOSIS — J96.01 ACUTE RESPIRATORY FAILURE WITH HYPOXIA (HCC): ICD-10-CM

## 2017-12-19 DIAGNOSIS — I25.5 ISCHEMIC CARDIOMYOPATHY: ICD-10-CM

## 2017-12-19 DIAGNOSIS — I50.20 SYSTOLIC CONGESTIVE HEART FAILURE, UNSPECIFIED CONGESTIVE HEART FAILURE CHRONICITY: ICD-10-CM

## 2017-12-19 PROBLEM — I50.9 CHF (CONGESTIVE HEART FAILURE) (HCC): Status: ACTIVE | Noted: 2017-12-19

## 2017-12-19 LAB
ALBUMIN SERPL BCP-MCNC: 3.4 G/DL (ref 3.2–4.9)
ALBUMIN/GLOB SERPL: 0.9 G/DL
ALP SERPL-CCNC: 123 U/L (ref 30–99)
ALT SERPL-CCNC: 8 U/L (ref 2–50)
ANION GAP SERPL CALC-SCNC: 7 MMOL/L (ref 0–11.9)
APPEARANCE UR: CLEAR
APTT PPP: 33.3 SEC (ref 24.7–36)
AST SERPL-CCNC: 15 U/L (ref 12–45)
BASOPHILS # BLD AUTO: 1.1 % (ref 0–1.8)
BASOPHILS # BLD: 0.14 K/UL (ref 0–0.12)
BILIRUB SERPL-MCNC: 1 MG/DL (ref 0.1–1.5)
BILIRUB UR QL STRIP.AUTO: NEGATIVE
BUN SERPL-MCNC: 16 MG/DL (ref 8–22)
CALCIUM SERPL-MCNC: 9 MG/DL (ref 8.5–10.5)
CHLORIDE SERPL-SCNC: 103 MMOL/L (ref 96–112)
CO2 SERPL-SCNC: 28 MMOL/L (ref 20–33)
COLOR UR: YELLOW
CREAT SERPL-MCNC: 1.11 MG/DL (ref 0.5–1.4)
EOSINOPHIL # BLD AUTO: 0.3 K/UL (ref 0–0.51)
EOSINOPHIL NFR BLD: 2.4 % (ref 0–6.9)
ERYTHROCYTE [DISTWIDTH] IN BLOOD BY AUTOMATED COUNT: 59.6 FL (ref 35.9–50)
EST. AVERAGE GLUCOSE BLD GHB EST-MCNC: 151 MG/DL
GFR SERPL CREATININE-BSD FRML MDRD: >60 ML/MIN/1.73 M 2
GLOBULIN SER CALC-MCNC: 3.6 G/DL (ref 1.9–3.5)
GLUCOSE SERPL-MCNC: 114 MG/DL (ref 65–99)
GLUCOSE UR STRIP.AUTO-MCNC: NEGATIVE MG/DL
HBA1C MFR BLD: 6.9 % (ref 0–5.6)
HCT VFR BLD AUTO: 47.8 % (ref 42–52)
HGB BLD-MCNC: 14.8 G/DL (ref 14–18)
IMM GRANULOCYTES # BLD AUTO: 0.13 K/UL (ref 0–0.11)
IMM GRANULOCYTES NFR BLD AUTO: 1 % (ref 0–0.9)
INR PPP: 1.28 (ref 0.87–1.13)
KETONES UR STRIP.AUTO-MCNC: NEGATIVE MG/DL
LEUKOCYTE ESTERASE UR QL STRIP.AUTO: NEGATIVE
LYMPHOCYTES # BLD AUTO: 0.81 K/UL (ref 1–4.8)
LYMPHOCYTES NFR BLD: 6.4 % (ref 22–41)
MCH RBC QN AUTO: 24.9 PG (ref 27–33)
MCHC RBC AUTO-ENTMCNC: 31 G/DL (ref 33.7–35.3)
MCV RBC AUTO: 80.3 FL (ref 81.4–97.8)
MICRO URNS: NORMAL
MONOCYTES # BLD AUTO: 0.78 K/UL (ref 0–0.85)
MONOCYTES NFR BLD AUTO: 6.2 % (ref 0–13.4)
NEUTROPHILS # BLD AUTO: 10.5 K/UL (ref 1.82–7.42)
NEUTROPHILS NFR BLD: 82.9 % (ref 44–72)
NITRITE UR QL STRIP.AUTO: NEGATIVE
NRBC # BLD AUTO: 0 K/UL
NRBC BLD-RTO: 0 /100 WBC
PH UR STRIP.AUTO: 6.5 [PH]
PLATELET # BLD AUTO: 243 K/UL (ref 164–446)
PMV BLD AUTO: 10.6 FL (ref 9–12.9)
POTASSIUM SERPL-SCNC: 3.8 MMOL/L (ref 3.6–5.5)
PROT SERPL-MCNC: 7 G/DL (ref 6–8.2)
PROT UR QL STRIP: NEGATIVE MG/DL
PROTHROMBIN TIME: 15.7 SEC (ref 12–14.6)
RBC # BLD AUTO: 5.95 M/UL (ref 4.7–6.1)
RBC UR QL AUTO: NEGATIVE
SODIUM SERPL-SCNC: 138 MMOL/L (ref 135–145)
SP GR UR STRIP.AUTO: 1.01
UROBILINOGEN UR STRIP.AUTO-MCNC: 1 MG/DL
WBC # BLD AUTO: 12.7 K/UL (ref 4.8–10.8)

## 2017-12-19 PROCEDURE — 93005 ELECTROCARDIOGRAM TRACING: CPT | Performed by: THORACIC SURGERY (CARDIOTHORACIC VASCULAR SURGERY)

## 2017-12-19 PROCEDURE — 770020 HCHG ROOM/CARE - TELE (206)

## 2017-12-19 PROCEDURE — 94760 N-INVAS EAR/PLS OXIMETRY 1: CPT

## 2017-12-19 PROCEDURE — 36415 COLL VENOUS BLD VENIPUNCTURE: CPT

## 2017-12-19 PROCEDURE — 700102 HCHG RX REV CODE 250 W/ 637 OVERRIDE(OP): Performed by: CLINICAL NURSE SPECIALIST

## 2017-12-19 PROCEDURE — A9270 NON-COVERED ITEM OR SERVICE: HCPCS | Performed by: CLINICAL NURSE SPECIALIST

## 2017-12-19 PROCEDURE — 700101 HCHG RX REV CODE 250

## 2017-12-19 PROCEDURE — 85610 PROTHROMBIN TIME: CPT

## 2017-12-19 PROCEDURE — 85730 THROMBOPLASTIN TIME PARTIAL: CPT

## 2017-12-19 PROCEDURE — 85025 COMPLETE CBC W/AUTO DIFF WBC: CPT

## 2017-12-19 PROCEDURE — 83036 HEMOGLOBIN GLYCOSYLATED A1C: CPT

## 2017-12-19 PROCEDURE — 94640 AIRWAY INHALATION TREATMENT: CPT

## 2017-12-19 PROCEDURE — 700111 HCHG RX REV CODE 636 W/ 250 OVERRIDE (IP): Performed by: CLINICAL NURSE SPECIALIST

## 2017-12-19 PROCEDURE — 81003 URINALYSIS AUTO W/O SCOPE: CPT

## 2017-12-19 PROCEDURE — 93010 ELECTROCARDIOGRAM REPORT: CPT | Performed by: INTERNAL MEDICINE

## 2017-12-19 PROCEDURE — 80053 COMPREHEN METABOLIC PANEL: CPT

## 2017-12-19 RX ORDER — POTASSIUM CHLORIDE 20 MEQ/1
20 TABLET, EXTENDED RELEASE ORAL 2 TIMES DAILY
Status: DISCONTINUED | OUTPATIENT
Start: 2017-12-19 | End: 2017-12-22

## 2017-12-19 RX ORDER — POTASSIUM CHLORIDE 750 MG/1
20 TABLET, FILM COATED, EXTENDED RELEASE ORAL DAILY
Status: ON HOLD | COMMUNITY
End: 2018-01-08

## 2017-12-19 RX ORDER — FUROSEMIDE 20 MG/1
20 TABLET ORAL DAILY
Status: ON HOLD | COMMUNITY
End: 2018-01-08

## 2017-12-19 RX ORDER — FUROSEMIDE 10 MG/ML
40 INJECTION INTRAMUSCULAR; INTRAVENOUS 2 TIMES DAILY
Status: DISCONTINUED | OUTPATIENT
Start: 2017-12-19 | End: 2017-12-22

## 2017-12-19 RX ORDER — ROSUVASTATIN CALCIUM 10 MG/1
20 TABLET, COATED ORAL EVERY EVENING
Status: DISCONTINUED | OUTPATIENT
Start: 2017-12-19 | End: 2018-01-01

## 2017-12-19 RX ORDER — IPRATROPIUM BROMIDE AND ALBUTEROL SULFATE 2.5; .5 MG/3ML; MG/3ML
SOLUTION RESPIRATORY (INHALATION)
Status: COMPLETED
Start: 2017-12-19 | End: 2017-12-19

## 2017-12-19 RX ORDER — ALBUTEROL SULFATE 90 UG/1
2 AEROSOL, METERED RESPIRATORY (INHALATION)
Status: DISCONTINUED | OUTPATIENT
Start: 2017-12-19 | End: 2017-12-20 | Stop reason: DRUGHIGH

## 2017-12-19 RX ORDER — IPRATROPIUM BROMIDE AND ALBUTEROL SULFATE 2.5; .5 MG/3ML; MG/3ML
3 SOLUTION RESPIRATORY (INHALATION)
Status: DISCONTINUED | OUTPATIENT
Start: 2017-12-19 | End: 2018-01-10 | Stop reason: HOSPADM

## 2017-12-19 RX ORDER — CARVEDILOL 3.12 MG/1
3.12 TABLET ORAL 2 TIMES DAILY WITH MEALS
Status: DISCONTINUED | OUTPATIENT
Start: 2017-12-19 | End: 2017-12-21

## 2017-12-19 RX ADMIN — CARVEDILOL 3.12 MG: 3.12 TABLET, FILM COATED ORAL at 17:35

## 2017-12-19 RX ADMIN — POTASSIUM CHLORIDE 20 MEQ: 1500 TABLET, EXTENDED RELEASE ORAL at 17:35

## 2017-12-19 RX ADMIN — FUROSEMIDE 40 MG: 10 INJECTION, SOLUTION INTRAMUSCULAR; INTRAVENOUS at 17:35

## 2017-12-19 RX ADMIN — ENOXAPARIN SODIUM 40 MG: 100 INJECTION SUBCUTANEOUS at 17:36

## 2017-12-19 RX ADMIN — IPRATROPIUM BROMIDE AND ALBUTEROL SULFATE 3 ML: .5; 3 SOLUTION RESPIRATORY (INHALATION) at 22:48

## 2017-12-19 RX ADMIN — ROSUVASTATIN CALCIUM 20 MG: 20 TABLET, FILM COATED ORAL at 20:33

## 2017-12-19 ASSESSMENT — COGNITIVE AND FUNCTIONAL STATUS - GENERAL
DAILY ACTIVITIY SCORE: 24
SUGGESTED CMS G CODE MODIFIER DAILY ACTIVITY: CH
SUGGESTED CMS G CODE MODIFIER MOBILITY: CH
MOBILITY SCORE: 24

## 2017-12-19 ASSESSMENT — COPD QUESTIONNAIRES
DURING THE PAST 4 WEEKS HOW MUCH DID YOU FEEL SHORT OF BREATH: NONE/LITTLE OF THE TIME
DO YOU EVER COUGH UP ANY MUCUS OR PHLEGM?: NO/ONLY WITH OCCASIONAL COLDS OR INFECTIONS
HAVE YOU SMOKED AT LEAST 100 CIGARETTES IN YOUR ENTIRE LIFE: YES
COPD SCREENING SCORE: 3

## 2017-12-19 ASSESSMENT — PAIN SCALES - GENERAL
PAINLEVEL_OUTOF10: 0
PAINLEVEL_OUTOF10: 0

## 2017-12-19 ASSESSMENT — LIFESTYLE VARIABLES
EVER_SMOKED: YES
EVER_SMOKED: YES
DO YOU DRINK ALCOHOL: NO

## 2017-12-20 LAB
ALBUMIN SERPL BCP-MCNC: 3.4 G/DL (ref 3.2–4.9)
ALBUMIN/GLOB SERPL: 0.9 G/DL
ALP SERPL-CCNC: 120 U/L (ref 30–99)
ALT SERPL-CCNC: 7 U/L (ref 2–50)
ANION GAP SERPL CALC-SCNC: 10 MMOL/L (ref 0–11.9)
AST SERPL-CCNC: 15 U/L (ref 12–45)
BILIRUB SERPL-MCNC: 0.9 MG/DL (ref 0.1–1.5)
BUN SERPL-MCNC: 20 MG/DL (ref 8–22)
CALCIUM SERPL-MCNC: 8.5 MG/DL (ref 8.5–10.5)
CHLORIDE SERPL-SCNC: 101 MMOL/L (ref 96–112)
CO2 SERPL-SCNC: 25 MMOL/L (ref 20–33)
CREAT SERPL-MCNC: 1.16 MG/DL (ref 0.5–1.4)
EKG IMPRESSION: NORMAL
GFR SERPL CREATININE-BSD FRML MDRD: >60 ML/MIN/1.73 M 2
GLOBULIN SER CALC-MCNC: 3.7 G/DL (ref 1.9–3.5)
GLUCOSE SERPL-MCNC: 97 MG/DL (ref 65–99)
POTASSIUM SERPL-SCNC: 3.5 MMOL/L (ref 3.6–5.5)
PROT SERPL-MCNC: 7.1 G/DL (ref 6–8.2)
SODIUM SERPL-SCNC: 136 MMOL/L (ref 135–145)

## 2017-12-20 PROCEDURE — 700101 HCHG RX REV CODE 250: Performed by: THORACIC SURGERY (CARDIOTHORACIC VASCULAR SURGERY)

## 2017-12-20 PROCEDURE — 700111 HCHG RX REV CODE 636 W/ 250 OVERRIDE (IP): Performed by: CLINICAL NURSE SPECIALIST

## 2017-12-20 PROCEDURE — A9270 NON-COVERED ITEM OR SERVICE: HCPCS | Performed by: THORACIC SURGERY (CARDIOTHORACIC VASCULAR SURGERY)

## 2017-12-20 PROCEDURE — 36415 COLL VENOUS BLD VENIPUNCTURE: CPT

## 2017-12-20 PROCEDURE — A9270 NON-COVERED ITEM OR SERVICE: HCPCS | Performed by: CLINICAL NURSE SPECIALIST

## 2017-12-20 PROCEDURE — 770020 HCHG ROOM/CARE - TELE (206)

## 2017-12-20 PROCEDURE — 700102 HCHG RX REV CODE 250 W/ 637 OVERRIDE(OP): Performed by: CLINICAL NURSE SPECIALIST

## 2017-12-20 PROCEDURE — 80053 COMPREHEN METABOLIC PANEL: CPT

## 2017-12-20 PROCEDURE — 94640 AIRWAY INHALATION TREATMENT: CPT

## 2017-12-20 PROCEDURE — 700102 HCHG RX REV CODE 250 W/ 637 OVERRIDE(OP): Performed by: THORACIC SURGERY (CARDIOTHORACIC VASCULAR SURGERY)

## 2017-12-20 PROCEDURE — 94760 N-INVAS EAR/PLS OXIMETRY 1: CPT

## 2017-12-20 RX ORDER — ALBUTEROL SULFATE 90 UG/1
2 AEROSOL, METERED RESPIRATORY (INHALATION) EVERY 4 HOURS
Status: DISCONTINUED | OUTPATIENT
Start: 2017-12-20 | End: 2017-12-20

## 2017-12-20 RX ORDER — ALBUTEROL SULFATE 90 UG/1
2 AEROSOL, METERED RESPIRATORY (INHALATION) EVERY 4 HOURS PRN
Status: DISCONTINUED | OUTPATIENT
Start: 2017-12-20 | End: 2017-12-22

## 2017-12-20 RX ADMIN — IPRATROPIUM BROMIDE AND ALBUTEROL SULFATE 3 ML: .5; 3 SOLUTION RESPIRATORY (INHALATION) at 08:44

## 2017-12-20 RX ADMIN — FUROSEMIDE 40 MG: 10 INJECTION, SOLUTION INTRAMUSCULAR; INTRAVENOUS at 08:14

## 2017-12-20 RX ADMIN — ALBUTEROL SULFATE 2 PUFF: 90 AEROSOL, METERED RESPIRATORY (INHALATION) at 01:40

## 2017-12-20 RX ADMIN — ALBUTEROL SULFATE 2 PUFF: 90 AEROSOL, METERED RESPIRATORY (INHALATION) at 05:58

## 2017-12-20 RX ADMIN — IPRATROPIUM BROMIDE AND ALBUTEROL SULFATE 3 ML: .5; 3 SOLUTION RESPIRATORY (INHALATION) at 15:48

## 2017-12-20 RX ADMIN — CARVEDILOL 3.12 MG: 3.12 TABLET, FILM COATED ORAL at 08:14

## 2017-12-20 RX ADMIN — FUROSEMIDE 40 MG: 10 INJECTION, SOLUTION INTRAMUSCULAR; INTRAVENOUS at 22:10

## 2017-12-20 RX ADMIN — ROSUVASTATIN CALCIUM 20 MG: 20 TABLET, FILM COATED ORAL at 22:10

## 2017-12-20 RX ADMIN — POTASSIUM CHLORIDE 20 MEQ: 1500 TABLET, EXTENDED RELEASE ORAL at 08:14

## 2017-12-20 RX ADMIN — POTASSIUM CHLORIDE 20 MEQ: 1500 TABLET, EXTENDED RELEASE ORAL at 22:10

## 2017-12-20 RX ADMIN — ENOXAPARIN SODIUM 40 MG: 100 INJECTION SUBCUTANEOUS at 08:14

## 2017-12-20 RX ADMIN — CARVEDILOL 3.12 MG: 3.12 TABLET, FILM COATED ORAL at 16:41

## 2017-12-20 RX ADMIN — ALBUTEROL SULFATE 2 PUFF: 90 AEROSOL, METERED RESPIRATORY (INHALATION) at 22:17

## 2017-12-20 ASSESSMENT — PAIN SCALES - GENERAL
PAINLEVEL_OUTOF10: 0

## 2017-12-20 NOTE — CARE PLAN
Problem: Communication  Goal: The ability to communicate needs accurately and effectively will improve  Outcome: PROGRESSING AS EXPECTED  Able to communicate effectively with nursing staff.    Problem: Knowledge Deficit  Goal: Knowledge of disease process/condition, treatment plan, diagnostic tests, and medications will improve  Outcome: PROGRESSING AS EXPECTED  Patient updated on today's plan of care.

## 2017-12-20 NOTE — CONSULTS
DATE OF SERVICE:  12/20/2017    REFERRING PHYSICIAN:  Dr. Kiel Ash.    CHIEF COMPLAINT:  Acute on chronic systolic congestive heart failure.    HISTORY OF PRESENT ILLNESS:  The patient is a 52-year-old male with past   medical history significant for chronic systolic congestive heart failure with  dilated cardiomyopathy, ejection fraction of 35%, multivessel coronary artery  disease, pending coronary artery bypass graft surgery, diabetes and COPD.    The patient is clinically doing well.  He has been started on IV diuretic therapy.    He states his lower extremity edema has improved.  He has lost 6 pounds since   yesterday.  He denies associated chest pain, shortness of breath, nausea,   vomiting or diaphoresis.    PAST MEDICAL ILLNESS:  ALLERGIES:  ERYTHROMYCIN, PENICILLIN, SHELLFISH ALLERGY.    MEDICATIONS:  Carvedilol 3.125 mg b.i.d., Lovenox subQ 40 mg daily, furosemide   40 mg IV b.i.d., K-Dur 20 mEq b.i.d., Crestor 20 mg daily.    PAST MEDICAL ILLNESS:  As above.    PAST SURGICAL HISTORY:  Cardiac catheterization as above.    SOCIAL HISTORY:  He is , positive for tobacco abuse.    FAMILY HISTORY:  Unremarkable.    REVIEW OF SYSTEMS:  Positive for weight loss and lower extremity edema.  GENERAL: The patient denies fever or chills.  HEENT: The patient denies headache, visual or hearing changes.   CENTRAL NERVOUS SYSTEM: The patient denies lightheadedness,   syncope or seizures.   ENDOCRINE: The patient denies thyroid disorder or diabetes.   RESPIRATORY: The patient denies productive cough, asthma or emphysema.  CARDIOVASCULAR: As above.   GASTROINTESTINAL: The patient denies bowel changes.   GENITOURINARY: The patient denies urinary changes.   PSYCHIATRIC: The patient denies depression or anxiety.   EXTREMITIES: The patient denies arthritis.    PHYSICAL EXAMINATION:  VITAL SIGNS:  Pulse 70, respirations 18, /72, temperature 36.6.  GENERAL:  No acute distress.  HEENT:  Eyes equal, oral  moist.  NECK:  Supple.  RESPIRATORY:  Clear to auscultation bilaterally.  Good effort.  CARDIOVASCULAR:  S1, S2 regular rate and rhythm without S3, S4 or murmur.  ABDOMEN:  Soft, nondistended, nontender.  No hepatosplenomegaly noted.  EXTREMITIES:  Upper extremities, no clubbing, cyanosis.  Lower extremities, 2+   edema.  NEUROLOGIC:  Alert and oriented x3.  PSYCHIATRIC:  No anxiety or depression.  SKIN:  Warm and dry.    CARDIAC STUDIES/PROCEDURES:    CARDIAC CATHETERIZATION CONCLUSIONS by Dr. Loya (11/23/17)  1.  Coronary artery disease with complete occlusion of the proximal right   coronary artery with left to right-sided collaterals.  2.  An 80% or greater lesion in the proximal circumflex.  3.  Eccentric 40% lesion in the proximal LAD followed by a spontaneous LAD   dissection.  FFR with probe in the distal LAD reveals FFR of 0.5 prior to   adenosine administration.  4.  Elevated left ventricular end diastolic pressure of 32.  5.  Hypokinesis of the anterior and inferior walls of the left ventricle.    Ejection fraction calculated at 38%.    CAROTID ULTRASOUND (11/23/17)  No significant stenosis of the bilateral internal carotid, vertebral, or subclavian arteries.    ECHOCARDIOGRAM CONCLUSIONS (11/19/17)  Normal left ventricular chamber size.  Mild concentric left ventricular hypertrophy.  Left ventricular ejection fraction is visually estimated to be 35%.  Global hypokinesis with regional variation.  Severely dilated right ventricle.  Reduced right ventricular systolic function.  Mildly dilated left atrium.  Enlarged right atrium.  Possible low grade, low flow stenosis.  Mild mitral regurgitation.  Mild tricuspid regurgitation.  Estimated right ventricular systolic pressure  is 35 mmHg.  Mild pulmonic insufficiency.  Normal pericardium without effusion.  Ascending aorta diameter is 3.6 cm.    EKG performed on (11/19/17) was reviewed: EKG shows sinus rhythm with anterior Q waves.    Laboratory results of  (11/19/17) were reviewed. Bun of 16 mg/dl, creatinine levels of 1.11 mg/dl noted.    ASSESSMENT AND PLAN:  1.  Acute on chronic systolic congestive heart failure with dilated   cardiomyopathy, ejection fraction of 35%.  The patient is a 52-year-old male   with a medical history as described above.  He is pending coronary artery   bypass graft surgery.  His volume overloaded state is improving.  We will   continue with diuretic therapy.  2.  Coronary artery disease.  Pending coronary artery bypass graft surgery:    Plan as per CT surgery.  3.  Hyperlipidemia:  He is doing well on statin therapy.  4.  Diabetes.  5.  Chronic obstructive pulmonary disease.       ____________________________________     MD CORIE RODRÍGUEZ / YOSVANY    DD:  12/20/2017 10:33:46  DT:  12/20/2017 10:52:02    D#:  [id number]  Job#:  [id number]

## 2017-12-20 NOTE — PROGRESS NOTES
Received report from nightshift RN, assumed care of patient. Patient is A&O x4, bed alarm on. Patient educated on importance of calling if in need of assistance. Verbalizes understanding. Patient 0/10 pain at this time. Patient updated on plan of care, voices no concerns at this time. Will continue to monitor for safety and comfort

## 2017-12-20 NOTE — PROGRESS NOTES
Two RN skin check complete.  Blanchable redness to bilateral elbows.  Blanching redness with rash to trunk.  Patient states the rash is related to ingrown hairs.  Very dry bilateral feet with small scabbed abrasion to right inner foot.

## 2017-12-20 NOTE — CARE PLAN
Problem: Communication  Goal: The ability to communicate needs accurately and effectively will improve  Outcome: PROGRESSING AS EXPECTED  Able to communicate effectively with nursing staff.    Problem: Knowledge Deficit  Goal: Knowledge of disease process/condition, treatment plan, diagnostic tests, and medications will improve  Outcome: PROGRESSING AS EXPECTED  Patient updated on admissions plan of care.

## 2017-12-20 NOTE — HEART FAILURE PROGRAM
"Cardiovascular Nurse Navigator () Progress Note:     Please note this is an acutely decompensated HFrEF pt. As of 12/20 diuresis is ongoing, Cardiology following, CTS consult pending. Per chart review, patient was to have CABG on prior admission 11/19-11/24 but he left AMA because he wanted to smoke.      [facility] Plan Notes:   None  Therapy Notes:   None  Insurance Coverage:  Medicare Primary, Medicaid FFS Secondary  Patient Residence:  [place]  Follow up appointment:   Pt must have an appointment scheduled within 7 days of discharge (Cardiology, PCP, or DC Clinic).      This DIL[facility] has placed an order for Hospital Schedulers to arrange f/u appointment within seven calendar days of anticipated discharge date: 12/26. Get into HF Program eventually if not for seven day appt.    Education:  Bedside nursing to please provide patient with HF packet and begin teaching and documenting in education tab, each shift should teach sections until all are covered.     When completing the after visit summary (discharge instructions) please select \"Cardiac Diagnosis, and Heart Failure\" in the special instructions section to populate the heart failure specific discharge instructions.     Referrals Placed:    Social Work     Assess for any social determinants that preclude patient's ability to:    Take medications daily (able to pick them up and afford them)     Weigh daily (safe living space that allows keeping equipment)  Go to follow up appointments regularly (getting there and paying co-pays)     Eat low sodium foods (access to markets that provide fresh foods, equipment in the home to prepare fresh foods ie: something more than a microwave/hot plate)    Call physician for worsening signs and symptoms (has a phone with minutes) to call physician when symptoms worsen     Registered Dietician  To provide education on HF diet.    Thank you and please call DILIP Vazquez with any questions: [phone].   "

## 2017-12-20 NOTE — DIETARY
Nutrition: Day 1 of admit. 51 yo male admitted with CHF.  Consult received for diet per RD - pt is currently on a 2 gram sodium restricted cardiac diet which is appropriate for this patient.  BMI >40 (=41.04). Weight loss counseling not appropriate in acute care setting.     RECOMMENDATIONS/PLAN:  1) nutrition representative to see pt daily for meal and snack preferences  2) Referral to outpatient nutrition services for weight management after D/C.

## 2017-12-20 NOTE — CARE PLAN
Problem: Knowledge Deficit  Goal: Knowledge of disease process/condition, treatment plan, diagnostic tests, and medications will improve    Intervention: Assess knowledge level of disease process/condition, treatment plan, diagnostic tests, and medications  Discussed HF education, POC, answered all questions for patient

## 2017-12-21 ENCOUNTER — PATIENT OUTREACH (OUTPATIENT)
Dept: HEALTH INFORMATION MANAGEMENT | Facility: OTHER | Age: 52
End: 2017-12-21

## 2017-12-21 ENCOUNTER — APPOINTMENT (OUTPATIENT)
Dept: RADIOLOGY | Facility: MEDICAL CENTER | Age: 52
DRG: 003 | End: 2017-12-21
Attending: NURSE PRACTITIONER
Payer: MEDICARE

## 2017-12-21 LAB
ABO GROUP BLD: NORMAL
ABO GROUP BLD: NORMAL
BLD GP AB SCN SERPL QL: NORMAL
RH BLD: NORMAL

## 2017-12-21 PROCEDURE — 94640 AIRWAY INHALATION TREATMENT: CPT

## 2017-12-21 PROCEDURE — 94760 N-INVAS EAR/PLS OXIMETRY 1: CPT

## 2017-12-21 PROCEDURE — 700102 HCHG RX REV CODE 250 W/ 637 OVERRIDE(OP): Performed by: CLINICAL NURSE SPECIALIST

## 2017-12-21 PROCEDURE — 93010 ELECTROCARDIOGRAM REPORT: CPT | Performed by: INTERNAL MEDICINE

## 2017-12-21 PROCEDURE — 93970 EXTREMITY STUDY: CPT | Mod: 26,GZ | Performed by: SURGERY

## 2017-12-21 PROCEDURE — A9270 NON-COVERED ITEM OR SERVICE: HCPCS | Performed by: CLINICAL NURSE SPECIALIST

## 2017-12-21 PROCEDURE — 93005 ELECTROCARDIOGRAM TRACING: CPT | Performed by: NURSE PRACTITIONER

## 2017-12-21 PROCEDURE — 93970 EXTREMITY STUDY: CPT

## 2017-12-21 PROCEDURE — 86850 RBC ANTIBODY SCREEN: CPT

## 2017-12-21 PROCEDURE — 86900 BLOOD TYPING SEROLOGIC ABO: CPT

## 2017-12-21 PROCEDURE — 82962 GLUCOSE BLOOD TEST: CPT

## 2017-12-21 PROCEDURE — 770020 HCHG ROOM/CARE - TELE (206)

## 2017-12-21 PROCEDURE — 86901 BLOOD TYPING SEROLOGIC RH(D): CPT

## 2017-12-21 PROCEDURE — 71020 DX-CHEST-2 VIEWS: CPT

## 2017-12-21 PROCEDURE — A9270 NON-COVERED ITEM OR SERVICE: HCPCS | Performed by: NURSE PRACTITIONER

## 2017-12-21 PROCEDURE — 700102 HCHG RX REV CODE 250 W/ 637 OVERRIDE(OP): Performed by: NURSE PRACTITIONER

## 2017-12-21 PROCEDURE — 700111 HCHG RX REV CODE 636 W/ 250 OVERRIDE (IP): Performed by: CLINICAL NURSE SPECIALIST

## 2017-12-21 PROCEDURE — 700101 HCHG RX REV CODE 250: Performed by: THORACIC SURGERY (CARDIOTHORACIC VASCULAR SURGERY)

## 2017-12-21 RX ORDER — ALBUTEROL SULFATE 90 UG/1
2 AEROSOL, METERED RESPIRATORY (INHALATION)
Status: DISCONTINUED | OUTPATIENT
Start: 2017-12-21 | End: 2018-01-10 | Stop reason: HOSPADM

## 2017-12-21 RX ORDER — LISINOPRIL 5 MG/1
5 TABLET ORAL
Status: DISCONTINUED | OUTPATIENT
Start: 2017-12-21 | End: 2017-12-27

## 2017-12-21 RX ORDER — DEXMEDETOMIDINE HYDROCHLORIDE 4 UG/ML
0-1.5 INJECTION, SOLUTION INTRAVENOUS CONTINUOUS
Status: DISCONTINUED | OUTPATIENT
Start: 2017-12-22 | End: 2017-12-22

## 2017-12-21 RX ORDER — POTASSIUM CHLORIDE 20 MEQ/1
20 TABLET, EXTENDED RELEASE ORAL ONCE
Status: COMPLETED | OUTPATIENT
Start: 2017-12-21 | End: 2017-12-21

## 2017-12-21 RX ORDER — ALPRAZOLAM 0.25 MG/1
0.25 TABLET ORAL EVERY 6 HOURS PRN
Status: DISCONTINUED | OUTPATIENT
Start: 2017-12-15 | End: 2018-01-01

## 2017-12-21 RX ORDER — CARVEDILOL 6.25 MG/1
6.25 TABLET ORAL 2 TIMES DAILY WITH MEALS
Status: DISCONTINUED | OUTPATIENT
Start: 2017-12-21 | End: 2017-12-27

## 2017-12-21 RX ADMIN — ROSUVASTATIN CALCIUM 20 MG: 20 TABLET, FILM COATED ORAL at 20:36

## 2017-12-21 RX ADMIN — ALBUTEROL SULFATE 2 PUFF: 90 AEROSOL, METERED RESPIRATORY (INHALATION) at 20:36

## 2017-12-21 RX ADMIN — ALBUTEROL SULFATE 2 PUFF: 90 AEROSOL, METERED RESPIRATORY (INHALATION) at 17:44

## 2017-12-21 RX ADMIN — CARVEDILOL 6.25 MG: 6.25 TABLET, FILM COATED ORAL at 17:16

## 2017-12-21 RX ADMIN — LISINOPRIL 5 MG: 5 TABLET ORAL at 10:54

## 2017-12-21 RX ADMIN — POTASSIUM CHLORIDE 20 MEQ: 1500 TABLET, EXTENDED RELEASE ORAL at 08:12

## 2017-12-21 RX ADMIN — IPRATROPIUM BROMIDE AND ALBUTEROL SULFATE 3 ML: .5; 3 SOLUTION RESPIRATORY (INHALATION) at 11:10

## 2017-12-21 RX ADMIN — POTASSIUM CHLORIDE 20 MEQ: 1500 TABLET, EXTENDED RELEASE ORAL at 10:54

## 2017-12-21 RX ADMIN — ENOXAPARIN SODIUM 40 MG: 100 INJECTION SUBCUTANEOUS at 08:12

## 2017-12-21 RX ADMIN — ALBUTEROL SULFATE 2 PUFF: 90 AEROSOL, METERED RESPIRATORY (INHALATION) at 03:07

## 2017-12-21 RX ADMIN — POTASSIUM CHLORIDE 20 MEQ: 1500 TABLET, EXTENDED RELEASE ORAL at 20:36

## 2017-12-21 RX ADMIN — FUROSEMIDE 40 MG: 10 INJECTION, SOLUTION INTRAMUSCULAR; INTRAVENOUS at 20:36

## 2017-12-21 RX ADMIN — FUROSEMIDE 40 MG: 10 INJECTION, SOLUTION INTRAMUSCULAR; INTRAVENOUS at 08:12

## 2017-12-21 RX ADMIN — CARVEDILOL 3.12 MG: 3.12 TABLET, FILM COATED ORAL at 08:12

## 2017-12-21 ASSESSMENT — PAIN SCALES - GENERAL
PAINLEVEL_OUTOF10: 0

## 2017-12-21 ASSESSMENT — ENCOUNTER SYMPTOMS
NEUROLOGICAL NEGATIVE: 1
RESPIRATORY NEGATIVE: 1
PSYCHIATRIC NEGATIVE: 1
DIZZINESS: 0
BLURRED VISION: 0
CHILLS: 0
HEADACHES: 0
FOCAL WEAKNESS: 0
WEAKNESS: 0
WEIGHT LOSS: 0
DOUBLE VISION: 0
VOMITING: 0
CARDIOVASCULAR NEGATIVE: 1
MYALGIAS: 0
NAUSEA: 0
PALPITATIONS: 0
MUSCULOSKELETAL NEGATIVE: 1
CLAUDICATION: 0
ABDOMINAL PAIN: 0
DEPRESSION: 0
CONSTITUTIONAL NEGATIVE: 1
EYES NEGATIVE: 1
NERVOUS/ANXIOUS: 0
COUGH: 0
FEVER: 0
BRUISES/BLEEDS EASILY: 0
SHORTNESS OF BREATH: 0
GASTROINTESTINAL NEGATIVE: 1

## 2017-12-21 NOTE — CARE PLAN
Problem: Safety  Goal: Will remain free from injury  Outcome: PROGRESSING AS EXPECTED  Fall precautions in place. Bed in lowest position. Non-skid socks in place. Personal possessions within reach. Mobility sign on door. Call light within reach. Pt educated regarding fall prevention and states understanding.     Problem: Knowledge Deficit  Goal: Knowledge of disease process/condition, treatment plan, diagnostic tests, and medications will improve  Outcome: PROGRESSING AS EXPECTED  POC, meds and procedures discussed, verbalizes understanding. All questions answered

## 2017-12-21 NOTE — PROGRESS NOTES
Bedside report received from Tara COPELAND. Patient's chart and MAR reviewed. 12 hour chart check complete. Pt is awake in bed. Pt is AOx4. Patient was updated on plan of care for the day. Questions answered and concerns addressed.  Pt denies any additional needs at this time. White board updated. Call light and personal belongings within reach, bed in lowest position.

## 2017-12-21 NOTE — PROGRESS NOTES
Pt with 7 bts VT, asymptomatic and vitals stable. Cardiology updated, no orders received. CABG scheduled for 12/22

## 2017-12-21 NOTE — CARE PLAN
Problem: Communication  Goal: The ability to communicate needs accurately and effectively will improve    Intervention: Educate patient and significant other/support system about the plan of care, procedures, treatments, medications and allow for questions  Pt educated on POC and answered all questions      Problem: Respiratory:  Goal: Respiratory status will improve    Intervention: Collaborate with respiratory therapist and Interdisciplinary Team on treatment measures to improve respiratory function  Rt working with pt and providing albuterol as needed

## 2017-12-21 NOTE — PROGRESS NOTES
Cardiology Progress Note               Author: Maco Goodwin Date & Time created: 12/21/2017  10:27 AM     Interval History:    52 year old male with acute on chronic systolic congestive heart failure, coronary artery disease pending coronary artery bypass graft surgery. He is clinically doing well. He admits to  Improving lower extremity edema.    Review of Systems   Constitutional: Negative.  Negative for chills, fever, malaise/fatigue and weight loss.   HENT: Negative.  Negative for hearing loss.    Eyes: Negative.  Negative for blurred vision and double vision.   Respiratory: Negative.  Negative for cough and shortness of breath.    Cardiovascular: Negative.  Negative for chest pain, palpitations, claudication and leg swelling.   Gastrointestinal: Negative.  Negative for abdominal pain, nausea and vomiting.   Genitourinary: Negative.  Negative for dysuria and urgency.   Musculoskeletal: Negative.  Negative for joint pain and myalgias.   Skin: Negative.  Negative for itching and rash.   Neurological: Negative.  Negative for dizziness, focal weakness, weakness and headaches.   Endo/Heme/Allergies: Negative.  Does not bruise/bleed easily.   Psychiatric/Behavioral: Negative.  Negative for depression. The patient is not nervous/anxious.        Physical Exam   Constitutional: He is oriented to person, place, and time. He appears well-developed and well-nourished.   HENT:   Head: Normocephalic and atraumatic.   Eyes: Conjunctivae are normal. Pupils are equal, round, and reactive to light.   Neck: Normal range of motion. Neck supple.   Cardiovascular: Normal rate and regular rhythm.    Pulmonary/Chest: Effort normal and breath sounds normal.   Abdominal: Soft. Bowel sounds are normal. He exhibits distension.   Musculoskeletal: Normal range of motion. He exhibits edema.   Neurological: He is alert and oriented to person, place, and time.   Skin: Skin is warm and dry.   Psychiatric: He has a normal mood and affect.        Hemodynamics:  Temp (24hrs), Av.4 °C (97.5 °F), Min:36.2 °C (97.1 °F), Max:36.6 °C (97.8 °F)  Temperature: 36.6 °C (97.8 °F)  Pulse  Av.7  Min: 62  Max: 93   Blood Pressure: 125/89     Respiratory:    Respiration: 18, Pulse Oximetry: 90 %, O2 Daily Delivery Respiratory : Room Air with O2 Available     Given By:: Mouthpiece, Work Of Breathing / Effort: Mild  RUL Breath Sounds: Diminished, RML Breath Sounds: Diminished, RLL Breath Sounds: Diminished, GENNARO Breath Sounds: Diminished, LLL Breath Sounds: Diminished  Fluids:  Date 17 0700 - 17 0659   Shift 2842-6036 5150-2712 6201-0924 24 Hour Total   I  N  T  A  K  E   P.O. 480   480    Shift Total 480   480   O  U  T  P  U  T   Urine 500   500    Shift Total 500   500   Weight (kg) 153.1 153.1 153.1 153.1       Weight: (!) 153.1 kg (337 lb 8.4 oz)  GI/Nutrition:  Orders Placed This Encounter   Procedures   • DIET ORDER     Standing Status:   Standing     Number of Occurrences:   1     Order Specific Question:   Diet:     Answer:   Cardiac [6]     Order Specific Question:   Diet:     Answer:   2 Gram Sodium [7]     Order Specific Question:   Consistency/Fluid modifications:     Answer:   2000 ml Fluid Restriction [11]     Lab Results:  Recent Labs      17   1704   WBC  12.7*   RBC  5.95   HEMOGLOBIN  14.8   HEMATOCRIT  47.8   MCV  80.3*   MCH  24.9*   MCHC  31.0*   RDW  59.6*   PLATELETCT  243   MPV  10.6     Recent Labs      17   1704  17   0204   SODIUM  138  136   POTASSIUM  3.8  3.5*   CHLORIDE  103  101   CO2  28  25   GLUCOSE  114*  97   BUN  16  20   CREATININE  1.11  1.16   CALCIUM  9.0  8.5     Recent Labs      17   170   APTT  33.3   INR  1.28*                 Medications reviewed.      Current Facility-Administered Medications:   •  albuterol inhaler 2 Puff, 2 Puff, Inhalation, Q4H PRN (RT), Kiel Ash M.D.  •  potassium chloride SA (Kdur) tablet 20 mEq, 20 mEq, Oral, Once, Rayne Mcdowell,  A.P.R.N.  •  carvedilol (COREG) tablet 6.25 mg, 6.25 mg, Oral, BID WITH MEALS, SUSI Price.P.R.N.  •  lisinopril (PRINIVIL) tablet 5 mg, 5 mg, Oral, Q DAY, Rayne Mcdowell A.P.R.N.  •  albuterol inhaler 2 Puff, 2 Puff, Inhalation, Q4HRS PRN, Kiel Ash M.D., 2 Puff at 12/21/17 0307  •  Respiratory Care per Protocol, , Nebulization, Continuous RT, Silvia Preston A.P.N.  •  furosemide (LASIX) injection 40 mg, 40 mg, Intravenous, BID, Silvia Preston A.P.N., 40 mg at 12/21/17 0812  •  potassium chloride SA (Kdur) tablet 20 mEq, 20 mEq, Oral, BID, Silvia Preston A.P.N., 20 mEq at 12/21/17 0812  •  rosuvastatin (CRESTOR) tablet 20 mg, 20 mg, Oral, Q EVENING, Silvia Preston, A.P.N., 20 mg at 12/20/17 2210  •  enoxaparin (LOVENOX) inj 40 mg, 40 mg, Subcutaneous, DAILY, Silvia Preston, A.P.N., 40 mg at 12/21/17 0812  •  ipratropium-albuterol (DUONEB) nebulizer solution 3 mL, 3 mL, Nebulization, Q4H PRN (RT), Kiel Ash M.D., 3 mL at 12/20/17 1548    CARDIAC STUDIES/PROCEDURES:     CARDIAC CATHETERIZATION CONCLUSIONS by Dr. Loya (11/23/17)  1.  Coronary artery disease with complete occlusion of the proximal right   coronary artery with left to right-sided collaterals.  2.  An 80% or greater lesion in the proximal circumflex.  3.  Eccentric 40% lesion in the proximal LAD followed by a spontaneous LAD   dissection.  FFR with probe in the distal LAD reveals FFR of 0.5 prior to   adenosine administration.  4.  Elevated left ventricular end diastolic pressure of 32.  5.  Hypokinesis of the anterior and inferior walls of the left ventricle.    Ejection fraction calculated at 38%.     CAROTID ULTRASOUND (11/23/17)  No significant stenosis of the bilateral internal carotid, vertebral, or subclavian arteries.     ECHOCARDIOGRAM CONCLUSIONS (11/19/17)  Normal left ventricular chamber size.  Mild concentric left ventricular hypertrophy.  Left ventricular ejection fraction is visually estimated to be  35%.  Global hypokinesis with regional variation.  Severely dilated right ventricle.  Reduced right ventricular systolic function.  Mildly dilated left atrium.  Enlarged right atrium.  Possible low grade, low flow stenosis.  Mild mitral regurgitation.  Mild tricuspid regurgitation.  Estimated right ventricular systolic pressure  is 35 mmHg.  Mild pulmonic insufficiency.  Normal pericardium without effusion.  Ascending aorta diameter is 3.6 cm.     EKG performed on (12/19/17) was reviewed: EKG shows sinus rhythm with anterior Q waves.    Medical Decision Making, by Problem:  Active Hospital Problems    Diagnosis   • CHF (congestive heart failure) (CMS-HCC) [I50.9]       Plan:  1.  Acute on chronic systolic congestive heart failure with dilated cardiomyopathy, ejection fraction of 35%. His volume overloaded state is improving. We will continue with diuretic therapy.  2.  Coronary artery disease.  Pending coronary artery bypass graft surgery:    Plan as per CT surgery.  3.  Hyperlipidemia:  He is doing well on statin therapy.  4.  Diabetes.  5.  Chronic obstructive pulmonary disease.      Quality-Core Measures

## 2017-12-21 NOTE — FLOWSHEET NOTE
12/21/17 1111   Events/Summary/Plan   Events/Summary/Plan pt not SOB chest just feels tight   Interdisciplinary Plan of Care-Goals (Indications)   Obstructive Ventilatory Defect or Pulmonary Disease without Obvious Obstruction Strong Subjective / Objective Improvement   Interdisciplinary Plan of Care-Outcomes    Bronchodilator Outcome Patient at Stable Baseline   Education   Education Yes - Pt. / Family has been Instructed in use of Respiratory Equipment   RT Assessment of Delivered Medications   Evaluation of Medication Delivery Daily Yes-- Pt /Family has been Instructed in use of Respiratory Medications and Adverse Reactions   SVN Group   #SVN Performed Yes   Given By: Mouthpiece   Respiratory WDL   Respiratory (WDL) X   Chest Exam   Work Of Breathing / Effort Mild   Respiration 18   Breath Sounds   RUL Breath Sounds Diminished   RML Breath Sounds Diminished   RLL Breath Sounds Diminished   GENNARO Breath Sounds Diminished   LLL Breath Sounds Diminished   Oximetry   #Pulse Oximetry (Single Determination) Yes   Oxygen   Pulse Oximetry 91 %   O2 (LPM) 0   O2 (FiO2) 21   O2 Daily Delivery Respiratory  Room Air with O2 Available

## 2017-12-22 ENCOUNTER — APPOINTMENT (OUTPATIENT)
Dept: RADIOLOGY | Facility: MEDICAL CENTER | Age: 52
DRG: 003 | End: 2017-12-22
Attending: CLINICAL NURSE SPECIALIST
Payer: MEDICARE

## 2017-12-22 ENCOUNTER — APPOINTMENT (OUTPATIENT)
Dept: RADIOLOGY | Facility: MEDICAL CENTER | Age: 52
DRG: 003 | End: 2017-12-22
Attending: THORACIC SURGERY (CARDIOTHORACIC VASCULAR SURGERY)
Payer: MEDICARE

## 2017-12-22 PROBLEM — I25.10 CAD (CORONARY ARTERY DISEASE): Status: ACTIVE | Noted: 2017-12-22

## 2017-12-22 LAB
ACTION RANGE TRIGGERED IACRT: NO
ACTION RANGE TRIGGERED IACRT: YES
ACTION RANGE TRIGGERED IACRT: YES
ALBUMIN SERPL BCP-MCNC: 3.5 G/DL (ref 3.2–4.9)
ALBUMIN/GLOB SERPL: 0.9 G/DL
ALP SERPL-CCNC: 114 U/L (ref 30–99)
ALT SERPL-CCNC: 6 U/L (ref 2–50)
ANION GAP SERPL CALC-SCNC: 9 MMOL/L (ref 0–11.9)
APTT PPP: 32.9 SEC (ref 24.7–36)
APTT PPP: 36.1 SEC (ref 24.7–36)
AST SERPL-CCNC: 15 U/L (ref 12–45)
BARCODED ABORH UBTYP: 6200
BARCODED PRD CODE UBPRD: NORMAL
BARCODED UNIT NUM UBUNT: NORMAL
BASE EXCESS BLDA CALC-SCNC: 0 MMOL/L (ref -4–3)
BASE EXCESS BLDA CALC-SCNC: 0 MMOL/L (ref -4–3)
BASE EXCESS BLDA CALC-SCNC: 1 MMOL/L (ref -4–3)
BASE EXCESS BLDA CALC-SCNC: 2 MMOL/L (ref -4–3)
BASE EXCESS BLDA CALC-SCNC: 2 MMOL/L (ref -4–3)
BASOPHILS # BLD AUTO: 1.3 % (ref 0–1.8)
BASOPHILS # BLD: 0.18 K/UL (ref 0–0.12)
BILIRUB SERPL-MCNC: 0.9 MG/DL (ref 0.1–1.5)
BODY TEMPERATURE: ABNORMAL DEGREES
BODY TEMPERATURE: NORMAL DEGREES
BUN SERPL-MCNC: 20 MG/DL (ref 8–22)
CA-I BLD ISE-SCNC: 1.08 MMOL/L (ref 1.1–1.3)
CA-I BLD ISE-SCNC: 1.1 MMOL/L (ref 1.1–1.3)
CALCIUM SERPL-MCNC: 9.8 MG/DL (ref 8.5–10.5)
CHLORIDE SERPL-SCNC: 99 MMOL/L (ref 96–112)
CO2 BLDA-SCNC: 25 MMOL/L (ref 20–33)
CO2 BLDA-SCNC: 27 MMOL/L (ref 20–33)
CO2 BLDA-SCNC: 29 MMOL/L (ref 20–33)
CO2 BLDA-SCNC: 29 MMOL/L (ref 20–33)
CO2 BLDA-SCNC: 30 MMOL/L (ref 20–33)
CO2 SERPL-SCNC: 29 MMOL/L (ref 20–33)
COMPONENT FT 8504FT: NORMAL
COMPONENT FT 8504FT: NORMAL
COMPONENT P 8504P: NORMAL
CREAT SERPL-MCNC: 1.16 MG/DL (ref 0.5–1.4)
EKG IMPRESSION: NORMAL
EKG IMPRESSION: NORMAL
EOSINOPHIL # BLD AUTO: 0.45 K/UL (ref 0–0.51)
EOSINOPHIL NFR BLD: 3.3 % (ref 0–6.9)
ERYTHROCYTE [DISTWIDTH] IN BLOOD BY AUTOMATED COUNT: 58 FL (ref 35.9–50)
EST. AVERAGE GLUCOSE BLD GHB EST-MCNC: 148 MG/DL
GFR SERPL CREATININE-BSD FRML MDRD: >60 ML/MIN/1.73 M 2
GLOBULIN SER CALC-MCNC: 4.1 G/DL (ref 1.9–3.5)
GLUCOSE BLD-MCNC: 106 MG/DL (ref 65–99)
GLUCOSE BLD-MCNC: 111 MG/DL (ref 65–99)
GLUCOSE BLD-MCNC: 111 MG/DL (ref 65–99)
GLUCOSE BLD-MCNC: 120 MG/DL (ref 65–99)
GLUCOSE BLD-MCNC: 127 MG/DL (ref 65–99)
GLUCOSE BLD-MCNC: 144 MG/DL (ref 65–99)
GLUCOSE BLD-MCNC: 144 MG/DL (ref 65–99)
GLUCOSE BLD-MCNC: 156 MG/DL (ref 65–99)
GLUCOSE BLD-MCNC: 195 MG/DL (ref 65–99)
GLUCOSE BLD-MCNC: 203 MG/DL (ref 65–99)
GLUCOSE SERPL-MCNC: 99 MG/DL (ref 65–99)
HBA1C MFR BLD: 6.8 % (ref 0–5.6)
HCO3 BLDA-SCNC: 23.7 MMOL/L (ref 17–25)
HCO3 BLDA-SCNC: 26.1 MMOL/L (ref 17–25)
HCO3 BLDA-SCNC: 27.1 MMOL/L (ref 17–25)
HCO3 BLDA-SCNC: 27.4 MMOL/L (ref 17–25)
HCO3 BLDA-SCNC: 28.5 MMOL/L (ref 17–25)
HCT VFR BLD AUTO: 48.8 % (ref 42–52)
HCT VFR BLD CALC: 39 % (ref 42–52)
HCT VFR BLD CALC: 42 % (ref 42–52)
HGB BLD-MCNC: 13.3 G/DL (ref 14–18)
HGB BLD-MCNC: 14.3 G/DL (ref 14–18)
HGB BLD-MCNC: 14.9 G/DL (ref 14–18)
IMM GRANULOCYTES # BLD AUTO: 0.11 K/UL (ref 0–0.11)
IMM GRANULOCYTES NFR BLD AUTO: 0.8 % (ref 0–0.9)
INR PPP: 1.25 (ref 0.87–1.13)
INR PPP: 1.6 (ref 0.87–1.13)
INST. QUALIFIED PATIENT IIQPT: YES
LYMPHOCYTES # BLD AUTO: 1.27 K/UL (ref 1–4.8)
LYMPHOCYTES NFR BLD: 9.4 % (ref 22–41)
MAGNESIUM SERPL-MCNC: 2.1 MG/DL (ref 1.5–2.5)
MCH RBC QN AUTO: 24.2 PG (ref 27–33)
MCHC RBC AUTO-ENTMCNC: 30.5 G/DL (ref 33.7–35.3)
MCV RBC AUTO: 79.2 FL (ref 81.4–97.8)
MONOCYTES # BLD AUTO: 0.94 K/UL (ref 0–0.85)
MONOCYTES NFR BLD AUTO: 6.9 % (ref 0–13.4)
NEUTROPHILS # BLD AUTO: 10.63 K/UL (ref 1.82–7.42)
NEUTROPHILS NFR BLD: 78.3 % (ref 44–72)
NRBC # BLD AUTO: 0 K/UL
NRBC BLD-RTO: 0 /100 WBC
O2/TOTAL GAS SETTING VFR VENT: 100 %
O2/TOTAL GAS SETTING VFR VENT: 40 %
O2/TOTAL GAS SETTING VFR VENT: 60 %
PCO2 BLDA: 35.1 MMHG (ref 26–37)
PCO2 BLDA: 39.9 MMHG (ref 26–37)
PCO2 BLDA: 46.7 MMHG (ref 26–37)
PCO2 BLDA: 52.1 MMHG (ref 26–37)
PCO2 BLDA: 59.1 MMHG (ref 26–37)
PCO2 TEMP ADJ BLDA: 35.4 MMHG (ref 26–37)
PCO2 TEMP ADJ BLDA: 39.3 MMHG (ref 26–37)
PCO2 TEMP ADJ BLDA: 46.1 MMHG (ref 26–37)
PCO2 TEMP ADJ BLDA: 50.5 MMHG (ref 26–37)
PCO2 TEMP ADJ BLDA: 57.6 MMHG (ref 26–37)
PH BLDA: 7.29 [PH] (ref 7.4–7.5)
PH BLDA: 7.32 [PH] (ref 7.4–7.5)
PH BLDA: 7.38 [PH] (ref 7.4–7.5)
PH BLDA: 7.42 [PH] (ref 7.4–7.5)
PH BLDA: 7.44 [PH] (ref 7.4–7.5)
PH TEMP ADJ BLDA: 7.3 [PH] (ref 7.4–7.5)
PH TEMP ADJ BLDA: 7.33 [PH] (ref 7.4–7.5)
PH TEMP ADJ BLDA: 7.38 [PH] (ref 7.4–7.5)
PH TEMP ADJ BLDA: 7.43 [PH] (ref 7.4–7.5)
PH TEMP ADJ BLDA: 7.43 [PH] (ref 7.4–7.5)
PLATELET # BLD AUTO: 266 K/UL (ref 164–446)
PLATELET # BLD AUTO: 316 K/UL (ref 164–446)
PMV BLD AUTO: 10.2 FL (ref 9–12.9)
PO2 BLDA: 104 MMHG (ref 64–87)
PO2 BLDA: 70 MMHG (ref 64–87)
PO2 BLDA: 78 MMHG (ref 64–87)
PO2 BLDA: 79 MMHG (ref 64–87)
PO2 BLDA: 85 MMHG (ref 64–87)
PO2 TEMP ADJ BLDA: 102 MMHG (ref 64–87)
PO2 TEMP ADJ BLDA: 68 MMHG (ref 64–87)
PO2 TEMP ADJ BLDA: 76 MMHG (ref 64–87)
PO2 TEMP ADJ BLDA: 79 MMHG (ref 64–87)
PO2 TEMP ADJ BLDA: 81 MMHG (ref 64–87)
POTASSIUM BLD-SCNC: 4.1 MMOL/L (ref 3.6–5.5)
POTASSIUM BLD-SCNC: 4.7 MMOL/L (ref 3.6–5.5)
POTASSIUM SERPL-SCNC: 4.5 MMOL/L (ref 3.6–5.5)
POTASSIUM SERPL-SCNC: 5.1 MMOL/L (ref 3.6–5.5)
POTASSIUM SERPL-SCNC: 5.2 MMOL/L (ref 3.6–5.5)
PRODUCT TYPE UPROD: NORMAL
PROT SERPL-MCNC: 7.6 G/DL (ref 6–8.2)
PROTHROMBIN TIME: 15.4 SEC (ref 12–14.6)
PROTHROMBIN TIME: 18.7 SEC (ref 12–14.6)
RBC # BLD AUTO: 6.16 M/UL (ref 4.7–6.1)
SAO2 % BLDA: 94 % (ref 93–99)
SAO2 % BLDA: 94 % (ref 93–99)
SAO2 % BLDA: 95 % (ref 93–99)
SAO2 % BLDA: 96 % (ref 93–99)
SAO2 % BLDA: 98 % (ref 93–99)
SODIUM BLD-SCNC: 137 MMOL/L (ref 135–145)
SODIUM BLD-SCNC: 141 MMOL/L (ref 135–145)
SODIUM SERPL-SCNC: 137 MMOL/L (ref 135–145)
SPECIMEN DRAWN FROM PATIENT: ABNORMAL
SPECIMEN DRAWN FROM PATIENT: NORMAL
TRIGL SERPL-MCNC: 54 MG/DL (ref 0–149)
UNIT STATUS USTAT: NORMAL
WBC # BLD AUTO: 13.6 K/UL (ref 4.8–10.8)

## 2017-12-22 PROCEDURE — 700105 HCHG RX REV CODE 258: Performed by: NURSE PRACTITIONER

## 2017-12-22 PROCEDURE — 500385 HCHG DRAIN, PLEUROVAC ADUL: Performed by: THORACIC SURGERY (CARDIOTHORACIC VASCULAR SURGERY)

## 2017-12-22 PROCEDURE — 160042 HCHG SURGERY MINUTES - EA ADDL 1 MIN LEVEL 5: Performed by: THORACIC SURGERY (CARDIOTHORACIC VASCULAR SURGERY)

## 2017-12-22 PROCEDURE — 84295 ASSAY OF SERUM SODIUM: CPT | Mod: 91

## 2017-12-22 PROCEDURE — 80053 COMPREHEN METABOLIC PANEL: CPT

## 2017-12-22 PROCEDURE — 93325 DOPPLER ECHO COLOR FLOW MAPG: CPT

## 2017-12-22 PROCEDURE — C1725 CATH, TRANSLUMIN NON-LASER: HCPCS | Performed by: THORACIC SURGERY (CARDIOTHORACIC VASCULAR SURGERY)

## 2017-12-22 PROCEDURE — 500767 HCHG KIT, ENDOSCOPIC VEIN HARVEST: Performed by: THORACIC SURGERY (CARDIOTHORACIC VASCULAR SURGERY)

## 2017-12-22 PROCEDURE — 85025 COMPLETE CBC W/AUTO DIFF WBC: CPT

## 2017-12-22 PROCEDURE — 700111 HCHG RX REV CODE 636 W/ 250 OVERRIDE (IP): Performed by: THORACIC SURGERY (CARDIOTHORACIC VASCULAR SURGERY)

## 2017-12-22 PROCEDURE — 83036 HEMOGLOBIN GLYCOSYLATED A1C: CPT

## 2017-12-22 PROCEDURE — 700105 HCHG RX REV CODE 258: Performed by: THORACIC SURGERY (CARDIOTHORACIC VASCULAR SURGERY)

## 2017-12-22 PROCEDURE — 93010 ELECTROCARDIOGRAM REPORT: CPT | Performed by: INTERNAL MEDICINE

## 2017-12-22 PROCEDURE — 0BH17EZ INSERTION OF ENDOTRACHEAL AIRWAY INTO TRACHEA, VIA NATURAL OR ARTIFICIAL OPENING: ICD-10-PCS | Performed by: ANESTHESIOLOGY

## 2017-12-22 PROCEDURE — 82803 BLOOD GASES ANY COMBINATION: CPT | Mod: 91

## 2017-12-22 PROCEDURE — C1729 CATH, DRAINAGE: HCPCS | Performed by: THORACIC SURGERY (CARDIOTHORACIC VASCULAR SURGERY)

## 2017-12-22 PROCEDURE — 770022 HCHG ROOM/CARE - ICU (200)

## 2017-12-22 PROCEDURE — 700111 HCHG RX REV CODE 636 W/ 250 OVERRIDE (IP): Performed by: CLINICAL NURSE SPECIALIST

## 2017-12-22 PROCEDURE — 85014 HEMATOCRIT: CPT

## 2017-12-22 PROCEDURE — 700101 HCHG RX REV CODE 250

## 2017-12-22 PROCEDURE — 160031 HCHG SURGERY MINUTES - 1ST 30 MINS LEVEL 5: Performed by: THORACIC SURGERY (CARDIOTHORACIC VASCULAR SURGERY)

## 2017-12-22 PROCEDURE — 110371 HCHG SHELL REV 272: Performed by: THORACIC SURGERY (CARDIOTHORACIC VASCULAR SURGERY)

## 2017-12-22 PROCEDURE — 700111 HCHG RX REV CODE 636 W/ 250 OVERRIDE (IP): Performed by: NURSE PRACTITIONER

## 2017-12-22 PROCEDURE — 82947 ASSAY GLUCOSE BLOOD QUANT: CPT | Mod: 91

## 2017-12-22 PROCEDURE — 500890 HCHG PACK, OPEN HEART: Performed by: THORACIC SURGERY (CARDIOTHORACIC VASCULAR SURGERY)

## 2017-12-22 PROCEDURE — 85049 AUTOMATED PLATELET COUNT: CPT

## 2017-12-22 PROCEDURE — 500734 HCHG INSERT, STEALTH: Performed by: THORACIC SURGERY (CARDIOTHORACIC VASCULAR SURGERY)

## 2017-12-22 PROCEDURE — A9270 NON-COVERED ITEM OR SERVICE: HCPCS | Performed by: NURSE PRACTITIONER

## 2017-12-22 PROCEDURE — P9047 ALBUMIN (HUMAN), 25%, 50ML: HCPCS

## 2017-12-22 PROCEDURE — 503001 HCHG PERFUSION: Performed by: THORACIC SURGERY (CARDIOTHORACIC VASCULAR SURGERY)

## 2017-12-22 PROCEDURE — 500378 HCHG DRAIN, J-VAC ROUND 19FR: Performed by: THORACIC SURGERY (CARDIOTHORACIC VASCULAR SURGERY)

## 2017-12-22 PROCEDURE — A6402 STERILE GAUZE <= 16 SQ IN: HCPCS | Performed by: THORACIC SURGERY (CARDIOTHORACIC VASCULAR SURGERY)

## 2017-12-22 PROCEDURE — 500359 HCHG DILATORS, PARSONNET (OHS): Performed by: THORACIC SURGERY (CARDIOTHORACIC VASCULAR SURGERY)

## 2017-12-22 PROCEDURE — 36430 TRANSFUSION BLD/BLD COMPNT: CPT

## 2017-12-22 PROCEDURE — 93005 ELECTROCARDIOGRAM TRACING: CPT | Performed by: CLINICAL NURSE SPECIALIST

## 2017-12-22 PROCEDURE — 93312 ECHO TRANSESOPHAGEAL: CPT

## 2017-12-22 PROCEDURE — 501745 HCHG WIRE, SURGICAL STEEL: Performed by: THORACIC SURGERY (CARDIOTHORACIC VASCULAR SURGERY)

## 2017-12-22 PROCEDURE — A4450 NON-WATERPROOF TAPE: HCPCS | Performed by: THORACIC SURGERY (CARDIOTHORACIC VASCULAR SURGERY)

## 2017-12-22 PROCEDURE — 700111 HCHG RX REV CODE 636 W/ 250 OVERRIDE (IP)

## 2017-12-22 PROCEDURE — 500896 HCHG PACK, VASCULAR (FOR ROOM 10): Performed by: THORACIC SURGERY (CARDIOTHORACIC VASCULAR SURGERY)

## 2017-12-22 PROCEDURE — 500700 HCHG HEMOCLIP, SMALL (RED): Performed by: THORACIC SURGERY (CARDIOTHORACIC VASCULAR SURGERY)

## 2017-12-22 PROCEDURE — C1894 INTRO/SHEATH, NON-LASER: HCPCS | Performed by: THORACIC SURGERY (CARDIOTHORACIC VASCULAR SURGERY)

## 2017-12-22 PROCEDURE — C1898 LEAD, PMKR, OTHER THAN TRANS: HCPCS | Performed by: THORACIC SURGERY (CARDIOTHORACIC VASCULAR SURGERY)

## 2017-12-22 PROCEDURE — 82330 ASSAY OF CALCIUM: CPT | Mod: 91

## 2017-12-22 PROCEDURE — 500294 HCHG CLAMP, BULLDOG 1/2 FORCE BLUE: Performed by: THORACIC SURGERY (CARDIOTHORACIC VASCULAR SURGERY)

## 2017-12-22 PROCEDURE — 501519 HCHG SUTURE, E PACK: Performed by: THORACIC SURGERY (CARDIOTHORACIC VASCULAR SURGERY)

## 2017-12-22 PROCEDURE — 5A02210 ASSISTANCE WITH CARDIAC OUTPUT USING BALLOON PUMP, CONTINUOUS: ICD-10-PCS | Performed by: THORACIC SURGERY (CARDIOTHORACIC VASCULAR SURGERY)

## 2017-12-22 PROCEDURE — 700101 HCHG RX REV CODE 250: Performed by: NURSE PRACTITIONER

## 2017-12-22 PROCEDURE — 501183 HCHG PUNCH, AORTIC #4: Performed by: THORACIC SURGERY (CARDIOTHORACIC VASCULAR SURGERY)

## 2017-12-22 PROCEDURE — 500112 HCHG BONE WAX: Performed by: THORACIC SURGERY (CARDIOTHORACIC VASCULAR SURGERY)

## 2017-12-22 PROCEDURE — 85610 PROTHROMBIN TIME: CPT | Mod: 91

## 2017-12-22 PROCEDURE — 85730 THROMBOPLASTIN TIME PARTIAL: CPT | Mod: 91

## 2017-12-22 PROCEDURE — 700101 HCHG RX REV CODE 250: Performed by: THORACIC SURGERY (CARDIOTHORACIC VASCULAR SURGERY)

## 2017-12-22 PROCEDURE — 700101 HCHG RX REV CODE 250: Performed by: CLINICAL NURSE SPECIALIST

## 2017-12-22 PROCEDURE — 06BQ4ZZ EXCISION OF LEFT SAPHENOUS VEIN, PERCUTANEOUS ENDOSCOPIC APPROACH: ICD-10-PCS | Performed by: THORACIC SURGERY (CARDIOTHORACIC VASCULAR SURGERY)

## 2017-12-22 PROCEDURE — 500698 HCHG HEMOCLIP, MEDIUM: Performed by: THORACIC SURGERY (CARDIOTHORACIC VASCULAR SURGERY)

## 2017-12-22 PROCEDURE — 02100Z9 BYPASS CORONARY ARTERY, ONE ARTERY FROM LEFT INTERNAL MAMMARY, OPEN APPROACH: ICD-10-PCS | Performed by: THORACIC SURGERY (CARDIOTHORACIC VASCULAR SURGERY)

## 2017-12-22 PROCEDURE — 5A1223Z PERFORMANCE OF CARDIAC PACING, CONTINUOUS: ICD-10-PCS | Performed by: THORACIC SURGERY (CARDIOTHORACIC VASCULAR SURGERY)

## 2017-12-22 PROCEDURE — 94002 VENT MGMT INPAT INIT DAY: CPT

## 2017-12-22 PROCEDURE — P9034 PLATELETS, PHERESIS: HCPCS

## 2017-12-22 PROCEDURE — 700105 HCHG RX REV CODE 258

## 2017-12-22 PROCEDURE — C1751 CATH, INF, PER/CENT/MIDLINE: HCPCS | Performed by: THORACIC SURGERY (CARDIOTHORACIC VASCULAR SURGERY)

## 2017-12-22 PROCEDURE — 85347 COAGULATION TIME ACTIVATED: CPT | Mod: 91

## 2017-12-22 PROCEDURE — 160048 HCHG OR STATISTICAL LEVEL 1-5: Performed by: THORACIC SURGERY (CARDIOTHORACIC VASCULAR SURGERY)

## 2017-12-22 PROCEDURE — 82962 GLUCOSE BLOOD TEST: CPT | Mod: 91

## 2017-12-22 PROCEDURE — 700102 HCHG RX REV CODE 250 W/ 637 OVERRIDE(OP): Performed by: THORACIC SURGERY (CARDIOTHORACIC VASCULAR SURGERY)

## 2017-12-22 PROCEDURE — 503000 HCHG SUTURE, OHS: Performed by: THORACIC SURGERY (CARDIOTHORACIC VASCULAR SURGERY)

## 2017-12-22 PROCEDURE — 700105 HCHG RX REV CODE 258: Performed by: CLINICAL NURSE SPECIALIST

## 2017-12-22 PROCEDURE — P9017 PLASMA 1 DONOR FRZ W/IN 8 HR: HCPCS

## 2017-12-22 PROCEDURE — 5A1221Z PERFORMANCE OF CARDIAC OUTPUT, CONTINUOUS: ICD-10-PCS | Performed by: THORACIC SURGERY (CARDIOTHORACIC VASCULAR SURGERY)

## 2017-12-22 PROCEDURE — 021009W BYPASS CORONARY ARTERY, ONE ARTERY FROM AORTA WITH AUTOLOGOUS VENOUS TISSUE, OPEN APPROACH: ICD-10-PCS | Performed by: THORACIC SURGERY (CARDIOTHORACIC VASCULAR SURGERY)

## 2017-12-22 PROCEDURE — 5A1955Z RESPIRATORY VENTILATION, GREATER THAN 96 CONSECUTIVE HOURS: ICD-10-PCS | Performed by: ANESTHESIOLOGY

## 2017-12-22 PROCEDURE — 83735 ASSAY OF MAGNESIUM: CPT

## 2017-12-22 PROCEDURE — 37799 UNLISTED PX VASCULAR SURGERY: CPT

## 2017-12-22 PROCEDURE — 500389 HCHG DRAIN, RESERVOIR SUCT JP 100CC: Performed by: THORACIC SURGERY (CARDIOTHORACIC VASCULAR SURGERY)

## 2017-12-22 PROCEDURE — 160009 HCHG ANES TIME/MIN: Performed by: THORACIC SURGERY (CARDIOTHORACIC VASCULAR SURGERY)

## 2017-12-22 PROCEDURE — 84132 ASSAY OF SERUM POTASSIUM: CPT | Mod: 91

## 2017-12-22 PROCEDURE — 501445 HCHG STAPLER, SKIN DISP: Performed by: THORACIC SURGERY (CARDIOTHORACIC VASCULAR SURGERY)

## 2017-12-22 PROCEDURE — 500697 HCHG HEMOCLIP, LARGE (ORANGE): Performed by: THORACIC SURGERY (CARDIOTHORACIC VASCULAR SURGERY)

## 2017-12-22 PROCEDURE — B24BZZ4 ULTRASONOGRAPHY OF HEART WITH AORTA, TRANSESOPHAGEAL: ICD-10-PCS | Performed by: ANESTHESIOLOGY

## 2017-12-22 PROCEDURE — 700102 HCHG RX REV CODE 250 W/ 637 OVERRIDE(OP): Performed by: NURSE PRACTITIONER

## 2017-12-22 PROCEDURE — 84478 ASSAY OF TRIGLYCERIDES: CPT

## 2017-12-22 PROCEDURE — 71010 DX-CHEST-PORTABLE (1 VIEW): CPT

## 2017-12-22 DEVICE — IMPLANTABLE DEVICE: Type: IMPLANTABLE DEVICE | Status: FUNCTIONAL

## 2017-12-22 DEVICE — GLUE BIOGLUE 5CC PRE-FILL (5EA/PK - 4 TIPS PER SYRINGE): Type: IMPLANTABLE DEVICE | Status: FUNCTIONAL

## 2017-12-22 RX ORDER — DEXTROSE MONOHYDRATE 25 G/50ML
25 INJECTION, SOLUTION INTRAVENOUS PRN
Status: DISCONTINUED | OUTPATIENT
Start: 2017-12-22 | End: 2017-12-23

## 2017-12-22 RX ORDER — LACTULOSE 20 G/30ML
30 SOLUTION ORAL
Status: DISCONTINUED | OUTPATIENT
Start: 2017-12-22 | End: 2018-01-10 | Stop reason: HOSPADM

## 2017-12-22 RX ORDER — AMOXICILLIN 250 MG
1 CAPSULE ORAL NIGHTLY
Status: DISCONTINUED | OUTPATIENT
Start: 2017-12-22 | End: 2018-01-10 | Stop reason: HOSPADM

## 2017-12-22 RX ORDER — BISACODYL 10 MG
10 SUPPOSITORY, RECTAL RECTAL
Status: DISCONTINUED | OUTPATIENT
Start: 2017-12-22 | End: 2018-01-10 | Stop reason: HOSPADM

## 2017-12-22 RX ORDER — OXYCODONE HYDROCHLORIDE 10 MG/1
10 TABLET ORAL
Status: DISCONTINUED | OUTPATIENT
Start: 2017-12-22 | End: 2018-01-10 | Stop reason: HOSPADM

## 2017-12-22 RX ORDER — POTASSIUM CHLORIDE 7.45 MG/ML
10 INJECTION INTRAVENOUS ONCE
Status: COMPLETED | OUTPATIENT
Start: 2017-12-22 | End: 2017-12-22

## 2017-12-22 RX ORDER — MORPHINE SULFATE 4 MG/ML
4 INJECTION, SOLUTION INTRAMUSCULAR; INTRAVENOUS
Status: DISCONTINUED | OUTPATIENT
Start: 2017-12-22 | End: 2018-01-09

## 2017-12-22 RX ORDER — AMOXICILLIN 250 MG
1 CAPSULE ORAL
Status: DISCONTINUED | OUTPATIENT
Start: 2017-12-22 | End: 2018-01-10 | Stop reason: HOSPADM

## 2017-12-22 RX ORDER — TRAMADOL HYDROCHLORIDE 50 MG/1
50 TABLET ORAL EVERY 4 HOURS PRN
Status: DISCONTINUED | OUTPATIENT
Start: 2017-12-22 | End: 2018-01-10 | Stop reason: HOSPADM

## 2017-12-22 RX ORDER — SODIUM CHLORIDE 9 MG/ML
INJECTION, SOLUTION INTRAVENOUS
Status: ACTIVE
Start: 2017-12-22 | End: 2017-12-23

## 2017-12-22 RX ORDER — ALUMINA, MAGNESIA, AND SIMETHICONE 2400; 2400; 240 MG/30ML; MG/30ML; MG/30ML
30 SUSPENSION ORAL EVERY 4 HOURS PRN
Status: DISCONTINUED | OUTPATIENT
Start: 2017-12-22 | End: 2018-01-10 | Stop reason: HOSPADM

## 2017-12-22 RX ORDER — PROMETHAZINE HYDROCHLORIDE 25 MG/1
25 SUPPOSITORY RECTAL EVERY 6 HOURS PRN
Status: DISCONTINUED | OUTPATIENT
Start: 2017-12-22 | End: 2018-01-10 | Stop reason: HOSPADM

## 2017-12-22 RX ORDER — NOREPINEPHRINE BITARTRATE 1 MG/ML
INJECTION, SOLUTION INTRAVENOUS
Status: COMPLETED
Start: 2017-12-22 | End: 2017-12-22

## 2017-12-22 RX ORDER — PAPAVERINE HYDROCHLORIDE 30 MG/ML
INJECTION INTRAMUSCULAR; INTRAVENOUS
Status: DISCONTINUED | OUTPATIENT
Start: 2017-12-22 | End: 2017-12-22 | Stop reason: HOSPADM

## 2017-12-22 RX ORDER — ENEMA 19; 7 G/133ML; G/133ML
1 ENEMA RECTAL
Status: DISCONTINUED | OUTPATIENT
Start: 2017-12-22 | End: 2018-01-10 | Stop reason: HOSPADM

## 2017-12-22 RX ORDER — LORAZEPAM 2 MG/ML
INJECTION INTRAMUSCULAR
Status: DISPENSED
Start: 2017-12-22 | End: 2017-12-22

## 2017-12-22 RX ORDER — ACETAMINOPHEN 650 MG/1
650 SUPPOSITORY RECTAL EVERY 4 HOURS PRN
Status: DISCONTINUED | OUTPATIENT
Start: 2017-12-22 | End: 2018-01-10 | Stop reason: HOSPADM

## 2017-12-22 RX ORDER — MAGNESIUM SULFATE 1 G/100ML
1 INJECTION INTRAVENOUS DAILY
Status: COMPLETED | OUTPATIENT
Start: 2017-12-22 | End: 2017-12-24

## 2017-12-22 RX ORDER — SODIUM CHLORIDE 9 MG/ML
INJECTION, SOLUTION INTRAVENOUS CONTINUOUS
Status: DISCONTINUED | OUTPATIENT
Start: 2017-12-22 | End: 2018-01-06

## 2017-12-22 RX ORDER — ONDANSETRON 2 MG/ML
4 INJECTION INTRAMUSCULAR; INTRAVENOUS EVERY 6 HOURS PRN
Status: DISCONTINUED | OUTPATIENT
Start: 2017-12-22 | End: 2018-01-10 | Stop reason: HOSPADM

## 2017-12-22 RX ORDER — CLOPIDOGREL BISULFATE 75 MG/1
75 TABLET ORAL DAILY
Status: DISCONTINUED | OUTPATIENT
Start: 2017-12-23 | End: 2017-12-28

## 2017-12-22 RX ORDER — ACETAMINOPHEN 325 MG/1
650 TABLET ORAL EVERY 4 HOURS PRN
Status: DISCONTINUED | OUTPATIENT
Start: 2017-12-22 | End: 2018-01-10 | Stop reason: HOSPADM

## 2017-12-22 RX ORDER — OXYCODONE HYDROCHLORIDE 10 MG/1
5 TABLET ORAL
Status: DISCONTINUED | OUTPATIENT
Start: 2017-12-22 | End: 2018-01-10 | Stop reason: HOSPADM

## 2017-12-22 RX ORDER — SODIUM CHLORIDE 9 MG/ML
INJECTION, SOLUTION INTRAVENOUS
Status: COMPLETED
Start: 2017-12-22 | End: 2017-12-22

## 2017-12-22 RX ORDER — DEXMEDETOMIDINE HYDROCHLORIDE 4 UG/ML
.1-1.5 INJECTION, SOLUTION INTRAVENOUS CONTINUOUS
Status: DISCONTINUED | OUTPATIENT
Start: 2017-12-22 | End: 2017-12-29

## 2017-12-22 RX ORDER — DOCUSATE SODIUM 100 MG/1
100 CAPSULE, LIQUID FILLED ORAL EVERY MORNING
Status: DISCONTINUED | OUTPATIENT
Start: 2017-12-22 | End: 2017-12-29

## 2017-12-22 RX ORDER — DEXMEDETOMIDINE HYDROCHLORIDE 4 UG/ML
.1-1.5 INJECTION, SOLUTION INTRAVENOUS CONTINUOUS
Status: DISCONTINUED | OUTPATIENT
Start: 2017-12-22 | End: 2017-12-22

## 2017-12-22 RX ORDER — DIPHENHYDRAMINE HCL 25 MG
25 TABLET ORAL
Status: DISCONTINUED | OUTPATIENT
Start: 2017-12-22 | End: 2018-01-10 | Stop reason: HOSPADM

## 2017-12-22 RX ORDER — SODIUM CHLORIDE, SODIUM GLUCONATE, SODIUM ACETATE, POTASSIUM CHLORIDE AND MAGNESIUM CHLORIDE 526; 502; 368; 37; 30 MG/100ML; MG/100ML; MG/100ML; MG/100ML; MG/100ML
INJECTION, SOLUTION INTRAVENOUS PRN
Status: DISCONTINUED | OUTPATIENT
Start: 2017-12-22 | End: 2018-01-06

## 2017-12-22 RX ORDER — MIDAZOLAM HYDROCHLORIDE 1 MG/ML
.5-2 INJECTION INTRAMUSCULAR; INTRAVENOUS
Status: DISCONTINUED | OUTPATIENT
Start: 2017-12-22 | End: 2018-01-01

## 2017-12-22 RX ADMIN — DEXMEDETOMIDINE HYDROCHLORIDE 1.5 MCG/KG/HR: 4 INJECTION, SOLUTION INTRAVENOUS at 16:42

## 2017-12-22 RX ADMIN — POTASSIUM CHLORIDE 10 MEQ: 7.46 INJECTION, SOLUTION INTRAVENOUS at 20:05

## 2017-12-22 RX ADMIN — MUPIROCIN 1 APPLICATION: 20 OINTMENT TOPICAL at 20:53

## 2017-12-22 RX ADMIN — MAGNESIUM SULFATE IN DEXTROSE 1 G: 10 INJECTION, SOLUTION INTRAVENOUS at 16:07

## 2017-12-22 RX ADMIN — MIDAZOLAM HYDROCHLORIDE 2 MG: 1 INJECTION, SOLUTION INTRAMUSCULAR; INTRAVENOUS at 19:45

## 2017-12-22 RX ADMIN — EPINEPHRINE 0.09 MCG/KG/MIN: 1 INJECTION, SOLUTION INTRAMUSCULAR; SUBCUTANEOUS at 19:48

## 2017-12-22 RX ADMIN — MUPIROCIN 1 APPLICATION: 20 OINTMENT TOPICAL at 15:13

## 2017-12-22 RX ADMIN — VANCOMYCIN HYDROCHLORIDE 2000 MG: 1 INJECTION, POWDER, LYOPHILIZED, FOR SOLUTION INTRAVENOUS at 21:04

## 2017-12-22 RX ADMIN — DEXMEDETOMIDINE HYDROCHLORIDE 1.5 MCG/KG/HR: 4 INJECTION, SOLUTION INTRAVENOUS at 20:30

## 2017-12-22 RX ADMIN — DEXMEDETOMIDINE HYDROCHLORIDE 0.7 MCG/KG/HR: 4 INJECTION, SOLUTION INTRAVENOUS at 14:40

## 2017-12-22 RX ADMIN — CARVEDILOL 6.25 MG: 6.25 TABLET, FILM COATED ORAL at 06:05

## 2017-12-22 RX ADMIN — DEXMEDETOMIDINE HYDROCHLORIDE 1.5 MCG/KG/HR: 4 INJECTION, SOLUTION INTRAVENOUS at 22:09

## 2017-12-22 RX ADMIN — SODIUM CHLORIDE 500 ML: 9 INJECTION, SOLUTION INTRAVENOUS at 14:11

## 2017-12-22 RX ADMIN — SODIUM CHLORIDE 3 UNITS/HR: 9 INJECTION, SOLUTION INTRAVENOUS at 14:08

## 2017-12-22 RX ADMIN — IPRATROPIUM BROMIDE AND ALBUTEROL SULFATE 3 ML: .5; 3 SOLUTION RESPIRATORY (INHALATION) at 03:40

## 2017-12-22 RX ADMIN — DEXMEDETOMIDINE HYDROCHLORIDE 1.5 MCG/KG/HR: 4 INJECTION, SOLUTION INTRAVENOUS at 23:56

## 2017-12-22 RX ADMIN — MORPHINE SULFATE 4 MG: 4 INJECTION INTRAVENOUS at 14:37

## 2017-12-22 RX ADMIN — MIDAZOLAM HYDROCHLORIDE 2 MG: 1 INJECTION, SOLUTION INTRAMUSCULAR; INTRAVENOUS at 15:53

## 2017-12-22 RX ADMIN — PROPOFOL 20 MCG/KG/MIN: 10 INJECTION, EMULSION INTRAVENOUS at 22:21

## 2017-12-22 RX ADMIN — MORPHINE SULFATE 4 MG: 4 INJECTION INTRAVENOUS at 15:38

## 2017-12-22 RX ADMIN — DEXMEDETOMIDINE HYDROCHLORIDE 1.3 MCG/KG/HR: 4 INJECTION, SOLUTION INTRAVENOUS at 18:57

## 2017-12-22 ASSESSMENT — PAIN SCALES - GENERAL
PAINLEVEL_OUTOF10: 0
PAINLEVEL_OUTOF10: 0

## 2017-12-22 NOTE — PROGRESS NOTES
Assumed care of patient at bedside report from day shift RN. Updated on POC. Family currently at bedside. ICU RN currently present to discuss preOP education. Patient A & O x 4; up self. Call light within reach. Fall precautions in place. Bed locked and in lowest position. No needs indicated at this time

## 2017-12-22 NOTE — CARE PLAN
Problem: Pre Op  Goal: Optimal preparation for CABG/Heart Valve surgery    Intervention: Pre Op education to patient/significant other. Provide patient [facility] Patient Guideline for Cardiac Surgery (See Pt. Ed.)  Provided by CIC charge RN     Intervention: Consents obtained for Surgery, Anesthesia and Transfusion/Bloodless Program Participant  Completed in chart  Intervention: Pre op checklist completed. Admit profile completed. Advanced directive verified.  Completed  Intervention: Pre op labs per MD order: CBC, CMP, INR, PTT, COD if not done in past 72 hours.  HbA1C if diabetic.  Completed & verified  Intervention: Pre op diagnostics per MD order (EKG, CXR, Bilateral carotid doppler study and vein mapping)  Completed  Intervention: Baseline assessment documented to include IS volume, weight, bilateral BP and peripheral pulses.  IS: max 2500  BL BP  Left: 122/76  Right: 112/78  Pulses: equal bilaterally  R Radial: +2  L Radial: +2  R Pedal: +1  L Pedal: +1  Intervention: NPO at midnight except cardiac medications. (No ASA, coumadin or Plavix)  NPO since 0000  Gave beta blocker at 0600  Intervention: Shower with chlorhexidine x 2  Completed 12/21 2330; 12/22 0300  Intervention: Prep with clippers  Completed  Intervention: Remove dentures, valuables and jewelry  Sent home with wife 12/21/17  Intervention: Determine if patient is taking Beta Blockers  Coreg given at 0600  Intervention: If diabetic, administer insulin night before surgery    No coverage needed  Intervention: If diabetic, FSBS in am and follow sliding scale if indicated  Coverage not needed;   Intervention: Pre op antibiotics sent to surgery with patient per MD order (surgery to administer)  Completed  Intervention: Medical record sent to surgery with current H&P  To be completed  Intervention: Transport to surgery with cardiac monitor and oxygen  To be completed

## 2017-12-22 NOTE — CARE PLAN
Problem: Knowledge Deficit  Goal: Knowledge of the prescribed therapeutic regimen will improve  Outcome: PROGRESSING AS EXPECTED  Discussed evening medications with patient and family. Asked appropriate questions. Verbalized understanding.

## 2017-12-22 NOTE — OR SURGEON
Immediate Post OP Note    PreOp Diagnosis: CAD    PostOp Diagnosis: CAD    Procedure(s):  MULTIPLE CORONARY ARTERY BYPASS ENDO VEIN HARVEST of left greater saphenous vein X2   LIMA to LAD, RSVG to OM- Wound Class: Clean with Drain  JIM - Wound Class: Clean Contaminated    Surgeon(s):  LISSA Chamberlain M.D.    Anesthesiologist/Type of Anesthesia:  Anesthesiologist: Chay Meyers M.D.  Anesthesia Technician: Namrata Izaguirre/General    Surgical Staff:  Assistant: SUZANNA Mendoza  Circulator: Katie Aly R.N.  Perfusionist: Laurie Flores  Scrub Person: Dario Arthur; Yeni Mann    Specimens:  * No specimens in log *    Estimated Blood Loss: Minimal    Findings: CAD    Complications: None        12/22/2017 1:22 PM Silvia Preston

## 2017-12-22 NOTE — CARE PLAN
Problem: Safety  Goal: Will remain free from falls  Outcome: PROGRESSING AS EXPECTED  Jossie Wolf Fall Risk Assessment:     Last Known Fall: No falls  Mobility: No limitations  Medications: Diuretics  Mental Status/LOC/Awareness: Awake, alert, and oriented to date, place, and person  Toileting Needs: Use of assistive device (Bedside commode, bedpan, urinal)  Volume/Electrolyte Status: No problems  Communication/Sensory: No deficits  Behavior: Appropriate behavior  Jossie Wolf Fall Risk Total: 7  Fall Risk Level: LOW RISK    Universal Fall Precautions:  call light/belongings in reach, bed in low position and locked, siderails up x 2, use non-slip footwear, adequate lighting, clutter free and spill free environment, educate on level of risk, educate to call for assistance    Fall Risk Level Interventions:   TRIAL (TELE 8, NEURO, MED KURT 5) Low Fall Risk Interventions  Place yellow fall risk ID band on patient: refused  Provide patient/family education based on risk assessment: completed  Educate patient/family to call staff for assistance when getting out of bed: completed  Place fall precaution signage outside patient door: verified      Patient Specific Interventions:     Medication: review medications with patient and family and provide patients who received diuretics/laxatives frequent toileting  Mental Status/LOC/Awareness: not applicable  Toileting: provide frquent toileting, monitor intake and output/use of appropriate interventions and instruct patient/family on the use of grab bars  Volume/Electrolyte Status: monitor blood sugars and maintain appropriate blood sugar levels if diabetic and monitor abnormal lab values  Communication/Sensory: update plan of care on whiteboard  Behavioral: encourage patient to voice feelings and engage patient in daily activities  Mobility: schedule physical activity throughout the day, provide comfort measures during transport, ensure bed is locked and in lowest position  and provide appropriate assistive device

## 2017-12-22 NOTE — OP REPORT
DATE OF SERVICE:  12/22/2017    PREOPERATIVE DIAGNOSES:  Coronary artery disease and congestive heart failure.    POSTOPERATIVE DIAGNOSES:  Coronary artery disease and congestive heart   failure.    PROCEDURES PERFORMED:  Coronary artery bypass grafting x2 with left internal   mammary to left anterior descending and reverse saphenous vein graft to the   major obtuse marginal branch, temporary right ventricular and right atrial   pacing leads, extracorporeal circulation, endoscopic saphenous vein harvesting   and placement of intraaortic balloon pump.    SURGEON:  Kiel Ash MD    FIRST ASSISTANT:  KAYLIE Cline    INDICATIONS:  The patient is a 52-year-old white male who presented to the   hospital in congestive heart failure approximately 2-1/2 weeks ago.  He was   found to have severe 3-vessel coronary artery disease including high grade   lesions of the left anterior descending, obtuse marginal branch and totally   occluded right coronary artery, ejection fraction of 35%.  He was treated   initially with Lasix for diuresis, he improved.  He underwent a heart   catheterization and was found to have the disease as mentioned above and was   recommended coronary artery bypass grafting.  The patient then left the   hospital AMA and returned with a 40-pound weight gain.  He was readmitted.  He   underwent diuresis and then he was brought to the operating room today for   coronary artery bypass grafting.    FINDINGS:  Dilated left ventricle.  Coronary vessels, left anterior descending   1.5 mm, major obtuse marginal branch 2 mm, ascending aorta without   calcification, internal mammary artery excellent, saphenous vein adequate,   right coronary artery non-bypassable.    SUMMARY OF OPERATION:  The patient was brought to the operating room and   placed on the operating table in supine position.  A [place]-Maddy catheter as   well as an art line were placed percutaneously by the anesthesiologist.  After   adequate  endotracheal anesthesia, a Ceballos catheter with a temperature probe   was placed.  The patient was prepped with Betadine solution, draped in the   usual sterile fashion.  A 2-team approach was utilized.  Greater saphenous   vein was harvested endoscopically from the patient's left leg.  The upper leg   was harvested and then removed.  There were no varicosities.  These were   repaired with 7-0 Prolene.  Side branches ligated with Hemoclips.  The leg was   then closed in standard fashion and there were some branches that needed to   be ligated with Hemoclips, but afterwards it was closed and wrapped with an   Ace wrap.  Silvia Preston assisted me at the chest for the remainder of the   operation.  A standard median sternotomy incision was made and carried through   the subcutaneous tissue using Bovie electrocautery.  The sternum was scored   in the midline and divided with reciprocating saw.  The internal mammary   artery was taken down on the left side as a skeletonized vessel.  Side   branches ligated with Hemoclips.  After it was taken down, the patient was   given 3 mg/kg of heparin.  It was detached distally, found to have pulsatile   flow.  A Hemoclip was placed on its very end and was soaked in papaverine   solution.  Next, pericardium was opened in the midline, T'd at the diaphragm   as well as at the aorta, and pericardial stay sutures were placed creating a   pericardial well.  The aorta was cannulated with a Sarns 6.5 mm aortic   cannula, 2-stage atrial caval cannula was positioned in the right atrial   appendage and cardioplegia needle placed in the ascending aorta.  All lines   were connected and the patient was placed on full cardiopulmonary bypass   without difficulty, cooled down to 34 degrees.  Aorta was crossclamped.  I   delivered cold blood cardioplegia in antegrade fashion to arrest the heart.    The major obtuse marginal branch was opened and an end-to-side anastomosis was   made with running 7-0  Prolene utilizing reverse saphenous vein graft.    Another dose of cardioplegia was then given.  The left anterior descending was   opened distally and an end-to-side anastomosis was made with internal mammary   to left anterior descending.  The bulldog was removed and flush of red blood   down the distribution of the LAD indicated a patent anastomosis.  Cross clamp   was removed.  The vein graft was distended to proper length, spatulated and   proximal anastomosis was performed with running 6-0 Prolene.  I de-aired the   graft by removing side biting clamp.  The patient was rewarmed to a bladder   temperature of 37 degrees.  Temporary right ventricular as well as right   atrial pacing leads were positioned.  Because the patient had poor left   ventricular function to begin with, he was placed on epinephrine and   milrinone.  We attempted to wean the patient from cardiopulmonary bypass.  We   were unsuccessful.  We went back on, up the dose of epinephrine and again   attempted to wean from cardiopulmonary bypass without success.  I then asked   my partner, Dr. Stan Cadena, to come in and place an intraaortic balloon   pump, which he was able to do using the Seldinger technique in the patient's   right femoral artery.  After placing the intraaortic balloon pump and   increasing dose of epinephrine, we were able to wean the patient from   cardiopulmonary bypass and he remained hemodynamically stable.  The atrial   caval cannula was able to be removed.  The patient was able to be given   protamine.  The aortic cannula was removed after half the protamine was given.    The patient remained hemodynamically stable.  Two chest tubes were then   positioned, brought it inferiorly and secured to the skin.  The sternum was   approximated with #5 sternal wire.  The linea alba, subcutaneous tissue and   skin closed in standard fashion.  Final sponge, needle and instrument counts   reported as correct.  The patient was  returned to intensive care unit in   stable condition.       ____________________________________     MD NATI ENCARNACION / YOSVANY    DD:  12/22/2017 14:33:00  DT:  12/22/2017 14:57:15    D#:  [id number]  Job#:  [id number]

## 2017-12-22 NOTE — CARE PLAN
Problem: Pre Op  Goal: Optimal preparation for CABG/Heart Valve surgery    Intervention: Pre Op education to patient/significant other. Provide patient [facility] Patient Guideline for Cardiac Surgery (See Pt. Ed.)  Discussed anatomy and physiology of cardiac surgery with patient and wife to include pre-op regimen. Reviewed post-op expectations to include  the use of incentive spirometry with return demonstration, ventilator management, cardiac monitoring, tubes and drains, early ambulation, and expected length of stay. Also provided information on Cardiac Rehab and how to schedule an appointment. Patient and family state full understanding of all information given.

## 2017-12-23 ENCOUNTER — APPOINTMENT (OUTPATIENT)
Dept: RADIOLOGY | Facility: MEDICAL CENTER | Age: 52
DRG: 003 | End: 2017-12-23
Attending: INTERNAL MEDICINE
Payer: MEDICARE

## 2017-12-23 ENCOUNTER — APPOINTMENT (OUTPATIENT)
Dept: RADIOLOGY | Facility: MEDICAL CENTER | Age: 52
DRG: 003 | End: 2017-12-23
Attending: CLINICAL NURSE SPECIALIST
Payer: MEDICARE

## 2017-12-23 ENCOUNTER — APPOINTMENT (OUTPATIENT)
Dept: RADIOLOGY | Facility: MEDICAL CENTER | Age: 52
DRG: 003 | End: 2017-12-23
Attending: NURSE PRACTITIONER
Payer: MEDICARE

## 2017-12-23 LAB
ACTION RANGE TRIGGERED IACRT: NO
ACTION RANGE TRIGGERED IACRT: YES
ALBUMIN SERPL BCP-MCNC: 2.6 G/DL (ref 3.2–4.9)
ALBUMIN/GLOB SERPL: 0.9 G/DL
ALP SERPL-CCNC: 90 U/L (ref 30–99)
ALT SERPL-CCNC: 10 U/L (ref 2–50)
ANION GAP SERPL CALC-SCNC: 8 MMOL/L (ref 0–11.9)
ANION GAP SERPL CALC-SCNC: 9 MMOL/L (ref 0–11.9)
ANISOCYTOSIS BLD QL SMEAR: ABNORMAL
APTT PPP: 34.5 SEC (ref 24.7–36)
AST SERPL-CCNC: 38 U/L (ref 12–45)
BASE EXCESS BLDA CALC-SCNC: -1 MMOL/L (ref -4–3)
BASE EXCESS BLDA CALC-SCNC: -2 MMOL/L (ref -4–3)
BASE EXCESS BLDA CALC-SCNC: -3 MMOL/L (ref -4–3)
BASE EXCESS BLDA CALC-SCNC: -4 MMOL/L (ref -4–3)
BASE EXCESS BLDA CALC-SCNC: 0 MMOL/L (ref -4–3)
BASE EXCESS BLDA CALC-SCNC: 0 MMOL/L (ref -4–3)
BASOPHILS # BLD AUTO: 0 % (ref 0–1.8)
BASOPHILS # BLD: 0 K/UL (ref 0–0.12)
BILIRUB SERPL-MCNC: 0.9 MG/DL (ref 0.1–1.5)
BODY TEMPERATURE: ABNORMAL DEGREES
BODY TEMPERATURE: NORMAL DEGREES
BUN SERPL-MCNC: 20 MG/DL (ref 8–22)
BUN SERPL-MCNC: 20 MG/DL (ref 8–22)
CA-I BLD ISE-SCNC: 1.13 MMOL/L (ref 1.1–1.3)
CALCIUM SERPL-MCNC: 7.9 MG/DL (ref 8.5–10.5)
CALCIUM SERPL-MCNC: 7.9 MG/DL (ref 8.5–10.5)
CHLORIDE SERPL-SCNC: 108 MMOL/L (ref 96–112)
CHLORIDE SERPL-SCNC: 108 MMOL/L (ref 96–112)
CO2 BLDA-SCNC: 21 MMOL/L (ref 20–33)
CO2 BLDA-SCNC: 22 MMOL/L (ref 20–33)
CO2 BLDA-SCNC: 23 MMOL/L (ref 20–33)
CO2 BLDA-SCNC: 23 MMOL/L (ref 20–33)
CO2 BLDA-SCNC: 25 MMOL/L (ref 20–33)
CO2 BLDA-SCNC: 25 MMOL/L (ref 20–33)
CO2 SERPL-SCNC: 22 MMOL/L (ref 20–33)
CO2 SERPL-SCNC: 22 MMOL/L (ref 20–33)
CREAT SERPL-MCNC: 0.98 MG/DL (ref 0.5–1.4)
CREAT SERPL-MCNC: 1.1 MG/DL (ref 0.5–1.4)
EKG IMPRESSION: NORMAL
EOSINOPHIL # BLD AUTO: 0 K/UL (ref 0–0.51)
EOSINOPHIL NFR BLD: 0 % (ref 0–6.9)
ERYTHROCYTE [DISTWIDTH] IN BLOOD BY AUTOMATED COUNT: 57.8 FL (ref 35.9–50)
ERYTHROCYTE [DISTWIDTH] IN BLOOD BY AUTOMATED COUNT: 59.6 FL (ref 35.9–50)
GFR SERPL CREATININE-BSD FRML MDRD: >60 ML/MIN/1.73 M 2
GFR SERPL CREATININE-BSD FRML MDRD: >60 ML/MIN/1.73 M 2
GLOBULIN SER CALC-MCNC: 2.9 G/DL (ref 1.9–3.5)
GLUCOSE BLD-MCNC: 102 MG/DL (ref 65–99)
GLUCOSE BLD-MCNC: 102 MG/DL (ref 65–99)
GLUCOSE BLD-MCNC: 107 MG/DL (ref 65–99)
GLUCOSE BLD-MCNC: 108 MG/DL (ref 65–99)
GLUCOSE BLD-MCNC: 113 MG/DL (ref 65–99)
GLUCOSE BLD-MCNC: 119 MG/DL (ref 65–99)
GLUCOSE BLD-MCNC: 133 MG/DL (ref 65–99)
GLUCOSE BLD-MCNC: 134 MG/DL (ref 65–99)
GLUCOSE BLD-MCNC: 140 MG/DL (ref 65–99)
GLUCOSE BLD-MCNC: 144 MG/DL (ref 65–99)
GLUCOSE BLD-MCNC: 156 MG/DL (ref 65–99)
GLUCOSE BLD-MCNC: 64 MG/DL (ref 65–99)
GLUCOSE BLD-MCNC: 93 MG/DL (ref 65–99)
GLUCOSE SERPL-MCNC: 108 MG/DL (ref 65–99)
GLUCOSE SERPL-MCNC: 162 MG/DL (ref 65–99)
HCO3 BLDA-SCNC: 20.1 MMOL/L (ref 17–25)
HCO3 BLDA-SCNC: 20.9 MMOL/L (ref 17–25)
HCO3 BLDA-SCNC: 21.9 MMOL/L (ref 17–25)
HCO3 BLDA-SCNC: 22.2 MMOL/L (ref 17–25)
HCO3 BLDA-SCNC: 24 MMOL/L (ref 17–25)
HCO3 BLDA-SCNC: 24.3 MMOL/L (ref 17–25)
HCT VFR BLD AUTO: 38.6 % (ref 42–52)
HCT VFR BLD AUTO: 39.8 % (ref 42–52)
HCT VFR BLD CALC: 37 % (ref 42–52)
HGB BLD-MCNC: 11.9 G/DL (ref 14–18)
HGB BLD-MCNC: 12.2 G/DL (ref 14–18)
HGB BLD-MCNC: 12.6 G/DL (ref 14–18)
HYPOCHROMIA BLD QL SMEAR: ABNORMAL
INR PPP: 1.36 (ref 0.87–1.13)
INST. QUALIFIED PATIENT IIQPT: YES
LG PLATELETS BLD QL SMEAR: NORMAL
LYMPHOCYTES # BLD AUTO: 0.79 K/UL (ref 1–4.8)
LYMPHOCYTES NFR BLD: 2.6 % (ref 22–41)
MAGNESIUM SERPL-MCNC: 2.5 MG/DL (ref 1.5–2.5)
MANUAL DIFF BLD: NORMAL
MCH RBC QN AUTO: 23.9 PG (ref 27–33)
MCH RBC QN AUTO: 25 PG (ref 27–33)
MCHC RBC AUTO-ENTMCNC: 29.9 G/DL (ref 33.7–35.3)
MCHC RBC AUTO-ENTMCNC: 31.6 G/DL (ref 33.7–35.3)
MCV RBC AUTO: 79.1 FL (ref 81.4–97.8)
MCV RBC AUTO: 79.9 FL (ref 81.4–97.8)
MICROCYTES BLD QL SMEAR: ABNORMAL
MONOCYTES # BLD AUTO: 1.59 K/UL (ref 0–0.85)
MONOCYTES NFR BLD AUTO: 5.2 % (ref 0–13.4)
MORPHOLOGY BLD-IMP: NORMAL
MYELOCYTES NFR BLD MANUAL: 0.9 %
NEUTROPHILS # BLD AUTO: 27.85 K/UL (ref 1.82–7.42)
NEUTROPHILS NFR BLD: 83.5 % (ref 44–72)
NEUTS BAND NFR BLD MANUAL: 7.8 % (ref 0–10)
NRBC # BLD AUTO: 0.02 K/UL
NRBC BLD-RTO: 0.1 /100 WBC
O2/TOTAL GAS SETTING VFR VENT: 60 %
O2/TOTAL GAS SETTING VFR VENT: 80 %
PCO2 BLDA: 29.2 MMHG (ref 26–37)
PCO2 BLDA: 32.4 MMHG (ref 26–37)
PCO2 BLDA: 32.5 MMHG (ref 26–37)
PCO2 BLDA: 34.8 MMHG (ref 26–37)
PCO2 BLDA: 36.1 MMHG (ref 26–37)
PCO2 BLDA: 36.7 MMHG (ref 26–37)
PCO2 TEMP ADJ BLDA: 30.4 MMHG (ref 26–37)
PCO2 TEMP ADJ BLDA: 33.8 MMHG (ref 26–37)
PCO2 TEMP ADJ BLDA: 34 MMHG (ref 26–37)
PCO2 TEMP ADJ BLDA: 36.5 MMHG (ref 26–37)
PCO2 TEMP ADJ BLDA: 37.4 MMHG (ref 26–37)
PCO2 TEMP ADJ BLDA: 38.5 MMHG (ref 26–37)
PH BLDA: 7.36 [PH] (ref 7.4–7.5)
PH BLDA: 7.44 [PH] (ref 7.4–7.5)
PH BLDA: 7.45 [PH] (ref 7.4–7.5)
PH BLDA: 7.45 [PH] (ref 7.4–7.5)
PH TEMP ADJ BLDA: 7.35 [PH] (ref 7.4–7.5)
PH TEMP ADJ BLDA: 7.42 [PH] (ref 7.4–7.5)
PH TEMP ADJ BLDA: 7.42 [PH] (ref 7.4–7.5)
PH TEMP ADJ BLDA: 7.43 [PH] (ref 7.4–7.5)
PLATELET # BLD AUTO: 370 K/UL (ref 164–446)
PLATELET # BLD AUTO: 377 K/UL (ref 164–446)
PLATELET BLD QL SMEAR: NORMAL
PMV BLD AUTO: 11.1 FL (ref 9–12.9)
PMV BLD AUTO: 11.1 FL (ref 9–12.9)
PO2 BLDA: 128 MMHG (ref 64–87)
PO2 BLDA: 50 MMHG (ref 64–87)
PO2 BLDA: 61 MMHG (ref 64–87)
PO2 BLDA: 66 MMHG (ref 64–87)
PO2 BLDA: 79 MMHG (ref 64–87)
PO2 BLDA: 79 MMHG (ref 64–87)
PO2 TEMP ADJ BLDA: 135 MMHG (ref 64–87)
PO2 TEMP ADJ BLDA: 53 MMHG (ref 64–87)
PO2 TEMP ADJ BLDA: 65 MMHG (ref 64–87)
PO2 TEMP ADJ BLDA: 70 MMHG (ref 64–87)
PO2 TEMP ADJ BLDA: 83 MMHG (ref 64–87)
PO2 TEMP ADJ BLDA: 85 MMHG (ref 64–87)
POLYCHROMASIA BLD QL SMEAR: NORMAL
POTASSIUM BLD-SCNC: 3.8 MMOL/L (ref 3.6–5.5)
POTASSIUM SERPL-SCNC: 3.9 MMOL/L (ref 3.6–5.5)
POTASSIUM SERPL-SCNC: 4.1 MMOL/L (ref 3.6–5.5)
PROT SERPL-MCNC: 5.5 G/DL (ref 6–8.2)
PROTHROMBIN TIME: 16.5 SEC (ref 12–14.6)
RBC # BLD AUTO: 4.88 M/UL (ref 4.7–6.1)
RBC # BLD AUTO: 4.98 M/UL (ref 4.7–6.1)
RBC BLD AUTO: PRESENT
SAO2 % BLDA: 87 % (ref 93–99)
SAO2 % BLDA: 93 % (ref 93–99)
SAO2 % BLDA: 94 % (ref 93–99)
SAO2 % BLDA: 95 % (ref 93–99)
SAO2 % BLDA: 96 % (ref 93–99)
SAO2 % BLDA: 99 % (ref 93–99)
SODIUM BLD-SCNC: 142 MMOL/L (ref 135–145)
SODIUM SERPL-SCNC: 138 MMOL/L (ref 135–145)
SODIUM SERPL-SCNC: 139 MMOL/L (ref 135–145)
SPECIMEN DRAWN FROM PATIENT: ABNORMAL
SPECIMEN DRAWN FROM PATIENT: NORMAL
WBC # BLD AUTO: 30.5 K/UL (ref 4.8–10.8)
WBC # BLD AUTO: 30.7 K/UL (ref 4.8–10.8)

## 2017-12-23 PROCEDURE — 85014 HEMATOCRIT: CPT

## 2017-12-23 PROCEDURE — 302978 HCHG BRONCHOSCOPY-DIAGNOSTIC

## 2017-12-23 PROCEDURE — 700102 HCHG RX REV CODE 250 W/ 637 OVERRIDE(OP): Performed by: CLINICAL NURSE SPECIALIST

## 2017-12-23 PROCEDURE — 0B9F8ZX DRAINAGE OF RIGHT LOWER LUNG LOBE, VIA NATURAL OR ARTIFICIAL OPENING ENDOSCOPIC, DIAGNOSTIC: ICD-10-PCS | Performed by: INTERNAL MEDICINE

## 2017-12-23 PROCEDURE — 92950 HEART/LUNG RESUSCITATION CPR: CPT

## 2017-12-23 PROCEDURE — 71010 DX-CHEST-LIMITED (1 VIEW): CPT

## 2017-12-23 PROCEDURE — 94003 VENT MGMT INPAT SUBQ DAY: CPT

## 2017-12-23 PROCEDURE — 700101 HCHG RX REV CODE 250: Performed by: THORACIC SURGERY (CARDIOTHORACIC VASCULAR SURGERY)

## 2017-12-23 PROCEDURE — 700111 HCHG RX REV CODE 636 W/ 250 OVERRIDE (IP): Performed by: NURSE PRACTITIONER

## 2017-12-23 PROCEDURE — 83735 ASSAY OF MAGNESIUM: CPT

## 2017-12-23 PROCEDURE — 87070 CULTURE OTHR SPECIMN AEROBIC: CPT

## 2017-12-23 PROCEDURE — 82962 GLUCOSE BLOOD TEST: CPT | Mod: 91

## 2017-12-23 PROCEDURE — 700111 HCHG RX REV CODE 636 W/ 250 OVERRIDE (IP): Performed by: INTERNAL MEDICINE

## 2017-12-23 PROCEDURE — 71010 DX-CHEST-PORTABLE (1 VIEW): CPT

## 2017-12-23 PROCEDURE — 700111 HCHG RX REV CODE 636 W/ 250 OVERRIDE (IP): Performed by: THORACIC SURGERY (CARDIOTHORACIC VASCULAR SURGERY)

## 2017-12-23 PROCEDURE — 700102 HCHG RX REV CODE 250 W/ 637 OVERRIDE(OP): Performed by: THORACIC SURGERY (CARDIOTHORACIC VASCULAR SURGERY)

## 2017-12-23 PROCEDURE — 87116 MYCOBACTERIA CULTURE: CPT

## 2017-12-23 PROCEDURE — 82330 ASSAY OF CALCIUM: CPT

## 2017-12-23 PROCEDURE — 87102 FUNGUS ISOLATION CULTURE: CPT

## 2017-12-23 PROCEDURE — 5A12012 PERFORMANCE OF CARDIAC OUTPUT, SINGLE, MANUAL: ICD-10-PCS | Performed by: INTERNAL MEDICINE

## 2017-12-23 PROCEDURE — 85730 THROMBOPLASTIN TIME PARTIAL: CPT

## 2017-12-23 PROCEDURE — 85610 PROTHROMBIN TIME: CPT

## 2017-12-23 PROCEDURE — 700105 HCHG RX REV CODE 258: Performed by: THORACIC SURGERY (CARDIOTHORACIC VASCULAR SURGERY)

## 2017-12-23 PROCEDURE — A9270 NON-COVERED ITEM OR SERVICE: HCPCS | Performed by: INTERNAL MEDICINE

## 2017-12-23 PROCEDURE — 87206 SMEAR FLUORESCENT/ACID STAI: CPT

## 2017-12-23 PROCEDURE — 700111 HCHG RX REV CODE 636 W/ 250 OVERRIDE (IP)

## 2017-12-23 PROCEDURE — 82803 BLOOD GASES ANY COMBINATION: CPT | Mod: 91

## 2017-12-23 PROCEDURE — 93010 ELECTROCARDIOGRAM REPORT: CPT | Performed by: INTERNAL MEDICINE

## 2017-12-23 PROCEDURE — 85007 BL SMEAR W/DIFF WBC COUNT: CPT

## 2017-12-23 PROCEDURE — 84132 ASSAY OF SERUM POTASSIUM: CPT

## 2017-12-23 PROCEDURE — 87205 SMEAR GRAM STAIN: CPT

## 2017-12-23 PROCEDURE — 93005 ELECTROCARDIOGRAM TRACING: CPT | Performed by: CLINICAL NURSE SPECIALIST

## 2017-12-23 PROCEDURE — 80053 COMPREHEN METABOLIC PANEL: CPT

## 2017-12-23 PROCEDURE — 87015 SPECIMEN INFECT AGNT CONCNTJ: CPT

## 2017-12-23 PROCEDURE — 80048 BASIC METABOLIC PNL TOTAL CA: CPT

## 2017-12-23 PROCEDURE — 700102 HCHG RX REV CODE 250 W/ 637 OVERRIDE(OP): Performed by: INTERNAL MEDICINE

## 2017-12-23 PROCEDURE — 700101 HCHG RX REV CODE 250: Performed by: CLINICAL NURSE SPECIALIST

## 2017-12-23 PROCEDURE — 700105 HCHG RX REV CODE 258: Performed by: NURSE PRACTITIONER

## 2017-12-23 PROCEDURE — 770022 HCHG ROOM/CARE - ICU (200)

## 2017-12-23 PROCEDURE — 85027 COMPLETE CBC AUTOMATED: CPT

## 2017-12-23 PROCEDURE — 84295 ASSAY OF SERUM SODIUM: CPT

## 2017-12-23 PROCEDURE — 700111 HCHG RX REV CODE 636 W/ 250 OVERRIDE (IP): Performed by: CLINICAL NURSE SPECIALIST

## 2017-12-23 PROCEDURE — 37799 UNLISTED PX VASCULAR SURGERY: CPT

## 2017-12-23 RX ORDER — PROPOFOL 10 MG/ML
40 INJECTION, EMULSION INTRAVENOUS ONCE
Status: COMPLETED | OUTPATIENT
Start: 2017-12-23 | End: 2017-12-23

## 2017-12-23 RX ORDER — POTASSIUM CHLORIDE 7.45 MG/ML
10 INJECTION INTRAVENOUS ONCE
Status: COMPLETED | OUTPATIENT
Start: 2017-12-23 | End: 2017-12-23

## 2017-12-23 RX ORDER — MIDAZOLAM HYDROCHLORIDE 1 MG/ML
INJECTION INTRAMUSCULAR; INTRAVENOUS
Status: COMPLETED
Start: 2017-12-23 | End: 2017-12-23

## 2017-12-23 RX ORDER — EPINEPHRINE 0.1 MG/ML
SYRINGE (ML) INJECTION
Status: COMPLETED | OUTPATIENT
Start: 2017-12-23 | End: 2017-12-23

## 2017-12-23 RX ORDER — FAMOTIDINE 20 MG/1
20 TABLET, FILM COATED ORAL 2 TIMES DAILY
Status: DISCONTINUED | OUTPATIENT
Start: 2017-12-23 | End: 2018-01-10 | Stop reason: HOSPADM

## 2017-12-23 RX ORDER — CHLORHEXIDINE GLUCONATE ORAL RINSE 1.2 MG/ML
15 SOLUTION DENTAL 2 TIMES DAILY
Status: DISCONTINUED | OUTPATIENT
Start: 2017-12-23 | End: 2018-01-10 | Stop reason: HOSPADM

## 2017-12-23 RX ADMIN — INSULIN HUMAN 5 UNITS: 100 INJECTION, SUSPENSION SUBCUTANEOUS at 21:14

## 2017-12-23 RX ADMIN — DEXMEDETOMIDINE HYDROCHLORIDE 1.5 MCG/KG/HR: 4 INJECTION, SOLUTION INTRAVENOUS at 15:55

## 2017-12-23 RX ADMIN — CHLORHEXIDINE GLUCONATE 15 ML: 1.2 RINSE ORAL at 21:12

## 2017-12-23 RX ADMIN — MUPIROCIN 1 APPLICATION: 20 OINTMENT TOPICAL at 08:41

## 2017-12-23 RX ADMIN — DEXMEDETOMIDINE HYDROCHLORIDE 1.5 MCG/KG/HR: 4 INJECTION, SOLUTION INTRAVENOUS at 17:31

## 2017-12-23 RX ADMIN — PROPOFOL 20 MCG/KG/MIN: 10 INJECTION, EMULSION INTRAVENOUS at 02:49

## 2017-12-23 RX ADMIN — DEXMEDETOMIDINE HYDROCHLORIDE 1.5 MCG/KG/HR: 4 INJECTION, SOLUTION INTRAVENOUS at 08:35

## 2017-12-23 RX ADMIN — FAMOTIDINE 20 MG: 10 INJECTION, SOLUTION INTRAVENOUS at 21:12

## 2017-12-23 RX ADMIN — DEXMEDETOMIDINE HYDROCHLORIDE 1.5 MCG/KG/HR: 4 INJECTION, SOLUTION INTRAVENOUS at 14:06

## 2017-12-23 RX ADMIN — IPRATROPIUM BROMIDE AND ALBUTEROL SULFATE 3 ML: .5; 3 SOLUTION RESPIRATORY (INHALATION) at 11:54

## 2017-12-23 RX ADMIN — DEXMEDETOMIDINE HYDROCHLORIDE 1.5 MCG/KG/HR: 4 INJECTION, SOLUTION INTRAVENOUS at 19:41

## 2017-12-23 RX ADMIN — MIDAZOLAM HYDROCHLORIDE 2 MG: 1 INJECTION, SOLUTION INTRAMUSCULAR; INTRAVENOUS at 08:08

## 2017-12-23 RX ADMIN — DEXMEDETOMIDINE HYDROCHLORIDE 1.5 MCG/KG/HR: 4 INJECTION, SOLUTION INTRAVENOUS at 23:55

## 2017-12-23 RX ADMIN — PROPOFOL 10 MCG/KG/MIN: 10 INJECTION, EMULSION INTRAVENOUS at 11:05

## 2017-12-23 RX ADMIN — DEXMEDETOMIDINE HYDROCHLORIDE 1.5 MCG/KG/HR: 4 INJECTION, SOLUTION INTRAVENOUS at 01:41

## 2017-12-23 RX ADMIN — DEXMEDETOMIDINE HYDROCHLORIDE 1.5 MCG/KG/HR: 4 INJECTION, SOLUTION INTRAVENOUS at 21:21

## 2017-12-23 RX ADMIN — PROPOFOL 20 MCG/KG/MIN: 10 INJECTION, EMULSION INTRAVENOUS at 06:00

## 2017-12-23 RX ADMIN — PROPOFOL 30 MCG/KG/MIN: 10 INJECTION, EMULSION INTRAVENOUS at 15:55

## 2017-12-23 RX ADMIN — EPINEPHRINE 0.09 MCG/KG/MIN: 1 INJECTION, SOLUTION INTRAMUSCULAR; SUBCUTANEOUS at 08:44

## 2017-12-23 RX ADMIN — PROPOFOL 25 MCG/KG/MIN: 10 INJECTION, EMULSION INTRAVENOUS at 19:41

## 2017-12-23 RX ADMIN — MIDAZOLAM HYDROCHLORIDE 2 MG: 1 INJECTION, SOLUTION INTRAMUSCULAR; INTRAVENOUS at 15:30

## 2017-12-23 RX ADMIN — PROPOFOL 40 MG: 10 INJECTION, EMULSION INTRAVENOUS at 10:40

## 2017-12-23 RX ADMIN — MAGNESIUM SULFATE IN DEXTROSE 1 G: 10 INJECTION, SOLUTION INTRAVENOUS at 08:42

## 2017-12-23 RX ADMIN — EPINEPHRINE 0.06 MCG/KG/MIN: 1 INJECTION, SOLUTION INTRAMUSCULAR; SUBCUTANEOUS at 22:25

## 2017-12-23 RX ADMIN — FAMOTIDINE 20 MG: 10 INJECTION, SOLUTION INTRAVENOUS at 11:18

## 2017-12-23 RX ADMIN — Medication 25 MCG/HR: at 12:34

## 2017-12-23 RX ADMIN — POTASSIUM CHLORIDE 10 MEQ: 7.46 INJECTION, SOLUTION INTRAVENOUS at 03:12

## 2017-12-23 RX ADMIN — MORPHINE SULFATE 4 MG: 4 INJECTION INTRAVENOUS at 02:39

## 2017-12-23 RX ADMIN — MIDAZOLAM HYDROCHLORIDE 2 MG: 1 INJECTION, SOLUTION INTRAMUSCULAR; INTRAVENOUS at 02:32

## 2017-12-23 RX ADMIN — SODIUM CHLORIDE 1 UNITS/HR: 9 INJECTION, SOLUTION INTRAVENOUS at 04:24

## 2017-12-23 RX ADMIN — DEXMEDETOMIDINE HYDROCHLORIDE 1.5 MCG/KG/HR: 4 INJECTION, SOLUTION INTRAVENOUS at 07:03

## 2017-12-23 RX ADMIN — DEXMEDETOMIDINE HYDROCHLORIDE 1.5 MCG/KG/HR: 4 INJECTION, SOLUTION INTRAVENOUS at 05:10

## 2017-12-23 RX ADMIN — DEXMEDETOMIDINE HYDROCHLORIDE 1.5 MCG/KG/HR: 4 INJECTION, SOLUTION INTRAVENOUS at 03:26

## 2017-12-23 RX ADMIN — MIDAZOLAM HYDROCHLORIDE 2 MG: 1 INJECTION, SOLUTION INTRAMUSCULAR; INTRAVENOUS at 09:33

## 2017-12-23 RX ADMIN — DEXMEDETOMIDINE HYDROCHLORIDE 1.5 MCG/KG/HR: 4 INJECTION, SOLUTION INTRAVENOUS at 10:47

## 2017-12-23 RX ADMIN — DEXMEDETOMIDINE HYDROCHLORIDE 1.5 MCG/KG/HR: 4 INJECTION, SOLUTION INTRAVENOUS at 12:24

## 2017-12-23 RX ADMIN — POTASSIUM CHLORIDE 10 MEQ: 7.46 INJECTION, SOLUTION INTRAVENOUS at 05:58

## 2017-12-23 RX ADMIN — MIDAZOLAM 2 MG: 1 INJECTION INTRAMUSCULAR; INTRAVENOUS at 09:33

## 2017-12-23 RX ADMIN — EPINEPHRINE 1 MG: 0.1 INJECTION INTRACARDIAC; INTRAVENOUS at 10:09

## 2017-12-23 RX ADMIN — PHENYLEPHRINE HYDROCHLORIDE 5 MCG/MIN: 10 INJECTION INTRAVENOUS at 08:47

## 2017-12-23 RX ADMIN — INSULIN HUMAN 5 UNITS: 100 INJECTION, SUSPENSION SUBCUTANEOUS at 12:36

## 2017-12-23 RX ADMIN — MUPIROCIN 1 APPLICATION: 20 OINTMENT TOPICAL at 21:20

## 2017-12-23 NOTE — PROGRESS NOTES
Bedside report received from MIN Patel.     Lines and gtts verified. IABP in place at 1:1, SERGE to [facility] site draining bright red drainage, H&H stable and previously discussed with NIKITA Diehl, ACE wrap reinforced with MIN Patel.

## 2017-12-23 NOTE — PROGRESS NOTES
Received bedside report from MIN Lepe. All continuous infusions verified, see MAR. Assumed care of patient. HR A-paced 100% (underlying rhythm of SR 60's with 1 degree AVB, frequent PAC/PVC's), SBP 's, 94% APVcmv 26/550/8/60%. IABP on 1:1.

## 2017-12-23 NOTE — PROCEDURES
Date: 12/23/2017    Procedure:  Emergent diagnostic and therapeutic bronchoscopy with aspiration and bal    Indication: Respiratory failure, ? pneumonia    Physician:  Rodolfo Landa M.D.    Consent:  Emergent     Procedure:  This patient has developed increasing respiratory failure, hypoxia, cxr with right basilar process, and s/p pea arrest. Bronchoscopy was indicated for airway evaluation and BAL to evaluate for pneumonia.  A flexible FOB was inserted through the ETT without difficulty.  All airways were evaluated to the sub-segmental level and secretions flushed and aspirated clear. There were moderate thick tan secretions in right lower lobe segments.   The airway mucosa was normal.  No endobronchial lesions were seen.  The bronchoscope was then wedged in a segment of the right lower lobe.  20cc of saline was instilled with moderate return of thick, tan, stringy BAL fluid.  The BAL specimen will be sent for appropriate culture.  No immediate complications.  EBL = 0.

## 2017-12-23 NOTE — PROGRESS NOTES
NIKITA Alvarez updated with patient brief hypotension with SBP in 50-60's, patient recovered with increased vasopressors within 3-4 minutes.  CO/CI, SVR, CVP/wedge also discussed. Orders received to give 500mL bolus of plasmalyte over the next hour.  Output from EVH drain and H&H values also discussed.

## 2017-12-23 NOTE — PROGRESS NOTES
Monet Antonio updated that the patient's SERGE drain on his upper left EVH site was continuing to drain joel red blood with a total loss around 200 mls since the patient came onto the unit at 1345. No change in H&H since the patient came onto the unit. Orders to reinforce the patient's bandage wrap with more pressure. This was completed with Sherley, pulses confirmed afterwards with a doppler. H&H will be monitored

## 2017-12-23 NOTE — DISCHARGE PLANNING
Referral: Code Blue    Intervention: Responded to Love Garcia.  SW proved emotional support to Lisa pt's wife.    Plan: As Above.

## 2017-12-23 NOTE — PROGRESS NOTES
BP in the 30's oxygen in the 60's. Patient being bagged by RT. Code blue called. Patient became bradycardic then PEA. CPR initiated. 2 mins of CPR, 1 of epi and patient regained pulses. See code blue charting.

## 2017-12-23 NOTE — PROGRESS NOTES
Rhythm: 100% Atrial pace at 80.   Underlying rhythm: SR-SB with 1st degree HB, Rate low 60's.  Occasional PVC's noted throughout shift.

## 2017-12-23 NOTE — PROGRESS NOTES
Patient becoming extremely agitated, trying to sit up in bed and not following commands. Multiple RNs at bedside to hold patient down to keep patient from pulling out lines.Versed given and propofol increased. KAYLIE Martinez at bedside. Received verbal orders for no sedation vacation at this time. Received orders to start patient on Jorge and to not increase Epi for BP support. Jorge order placed, see MAR.

## 2017-12-23 NOTE — PROGRESS NOTES
Spoke to Dr. Landa in regards to weaning FiO2. Received verbal orders to wean FiO2 based off of SpO2. Maintain SpO2 >95%.

## 2017-12-23 NOTE — PROGRESS NOTES
Patient oxygen 86-88% on APVcmv 26/550/10/60%. 100% on ventilator, patient suctioned and RT notified. CXR ordered per active orders and ABG to be repeated.

## 2017-12-23 NOTE — PROGRESS NOTES
Cardiovascular Surgery Progress Note    Name: Joshua Medrano  MRN: [id number]  : 1965  Admit Date: 2017 10:21 AM  Procedure:  Procedure(s) and Anesthesia Type:     * MULTIPLE CORONARY ARTERY BYPASS ENDO VEIN HARVEST X2 - General     * JIM - General  1 Day Post-Op    Vitals:  Patient Vitals for the past 8 hrs:   Temp Monitored Temp SpO2 O2 Delivery Pulse Heart Rate (Monitored) Resp NIBP Weight   17 0802 - - 100 % - - 80 - - -   17 0800 - - - Ventilator - - - - -   17 0700 - 38.2 °C (100.8 °F) 94 % - 80 80 (!) 26 - -   17 0630 - 38.1 °C (100.6 °F) 95 % - (!) 124 82 (!) 26 (!) 98/46 -   17 0615 - 38.1 °C (100.6 °F) 95 % - (!) 125 82 (!) 26 103/45 -   17 0600 - 38.1 °C (100.6 °F) 95 % - (!) 125 80 (!) 26 - -   17 0545 - 38 °C (100.4 °F) 98 % - (!) 129 80 (!) 26 - -   17 0530 - 38 °C (100.4 °F) 98 % - (!) 122 80 (!) 26 109/55 -   17 0515 - 37.9 °C (100.2 °F) 99 % - (!) 129 79 (!) 26 - -   17 0500 - 37.9 °C (100.2 °F) 99 % - (!) 123 80 (!) 26 109/54 -   17 0445 - 37.9 °C (100.2 °F) 99 % - (!) 124 80 (!) 26 107/56 -   17 0430 - 37.8 °C (100 °F) 99 % - (!) 125 80 (!) 26 (!) 99/54 -   17 0415 - 37.8 °C (100 °F) 98 % - (!) 126 81 (!) 26 - -   17 0400 37.8 °C (100 °F) 37.8 °C (100 °F) 98 % - (!) 125 80 (!) 26 106/52 (!) 152.5 kg (336 lb 3.2 oz)   17 0345 - 37.8 °C (100 °F) 98 % - (!) 125 80 (!) 26 102/50 -   17 0330 - 37.8 °C (100 °F) 97 % - (!) 126 80 (!) 26 - -   17 0315 - 37.8 °C (100 °F) 97 % - (!) 134 82 (!) 26 - -   17 0300 - 37.8 °C (100 °F) 94 % - 80 80 (!) 26 (!) 83/46 -   17 0245 - - - - - 80 18 (!) 88/43 -   17 0239 - - - - - 80 (!) 26 - -   17 0232 - - - - - 80 (!) 26 - -   17 0230 - - 97 % - (!) 125 80 (!) 26 - -   17 0215 - - 97 % - (!) 125 80 (!) 26 - -   17 0200 - 37.6 °C (99.7 °F) 97 % - (!) 125 80 (!) 26 - -   17 0145 - 37.6 °C (99.7 °F) 97 % - (!)  125 80 (!) 26 109/54 -   17 0130 - 37.6 °C (99.7 °F) 97 % - (!) 125 80 (!) 26 - -   17 0115 - 37.5 °C (99.5 °F) 97 % - (!) 122 80 (!) 26 - -   17 0100 - 37.5 °C (99.5 °F) 97 % - (!) 128 81 (!) 26 104/62 -   17 0045 - 37.5 °C (99.5 °F) 97 % - (!) 124 80 (!) 26 102/57 -   17 0030 - 37.4 °C (99.3 °F) 97 % - (!) 125 80 (!) 26 - -     Temp (24hrs), Av.3 °C (99.2 °F), Min:36.9 °C (98.4 °F), Max:37.8 °C (100 °F)      Respiratory:  Leo Vent Mode: APVCMV, Rate (breaths/min): 26, PEEP/CPAP: 10, FiO2: 60, P Peak (PIP): 28, P MEAN: 16 Respiration: (!) 26, Pulse Oximetry: 100 %  Chest Tube Group 1 (A) Mediastinal ANgled 32-Tube Status / Drainage: Sutured in Place;Patent;Draining;Small;Moderate;Sanguinous, Chest Tube Group 2 (B) Mediastinal Straight 32-Tube Status / Drainage: Sutured in Place;Patent;Draining;Small;Moderate;Sanguinous, Chest Tube Group 1 (A) Mediastinal ANgled 32-Device: Closed Drainage System;Suction 20 cm Water, Chest Tube Group 2 (B) Mediastinal Straight 32-Device: Closed Drainage System;Suction 20 cm Water  Chest Tube Drains:  Francisco Javier Girard 1: 5 ml  Mediastinal Chest Tube 1: 0 ml (total marked at 340)    Fluids:    Intake/Output Summary (Last 24 hours) at 17 0821  Last data filed at 17 0600   Gross per 24 hour   Intake          7881.22 ml   Output             3397 ml   Net          4484.22 ml     Admit weight: Weight: (!) 159.2 kg (350 lb 15.6 oz)  Current weight: Weight: (!) 152.5 kg (336 lb 3.2 oz) (17 0400)    Labs:  Recent Labs      17   0347  17   1400  17   0400   WBC  13.6*   --   30.7*   RBC  6.16*   --   4.88   HEMOGLOBIN  14.9   --   12.2*   HEMATOCRIT  48.8   --   38.6*   MCV  79.2*   --   79.1*   MCH  24.2*   --   25.0*   MCHC  30.5*   --   31.6*   RDW  58.0*   --   57.8*   PLATELETCT  266  316  370   MPV  10.2   --   11.1     Recent Labs      17   0347   NEUTSPOLYS  78.30*   LYMPHOCYTES  9.40*   MONOCYTES  6.90    EOSINOPHILS  3.30   BASOPHILS  1.30     Recent Labs      12/22/17   0347   12/23/17   0025  12/23/17   0400  12/23/17   0500   SODIUM  137   --    --   139   --    POTASSIUM  4.5   < >  3.9  4.1  4.1   CHLORIDE  99   --    --   108   --    CO2  29   --    --   22   --    GLUCOSE  99   --    --   108*   --    BUN  20   --    --   20   --    CREATININE  1.16   --    --   0.98   --    CALCIUM  9.8   --    --   7.9*   --     < > = values in this interval not displayed.     Recent Labs      12/22/17   0347  12/22/17   1400   APTT  32.9  36.1*   INR  1.25*  1.60*       Medications:  • insulin NPH  5 Units     • insulin lispro  4 Units     • insulin lispro  0-15 Units     • K+ Scale: Goal of 4.5  1 Each     • docusate sodium  100 mg      And   • senna-docusate  1 Tab     • enoxaparin  40 mg     • mupirocin  1 Application     • aspirin EC  81 mg     • clopidogrel  75 mg     • insulin regular  0-14 Units     • magnesium sulfate  1 g Stopped (12/22/17 1707)   • carvedilol  6.25 mg     • lisinopril  5 mg     • rosuvastatin  20 mg         Exam:   Review of Systems   Unable to perform ROS: Intubated       Physical Exam   Constitutional: No distress. He is intubated.   Neck: Neck supple.   Cardiovascular: Normal rate and regular rhythm.  Exam reveals friction rub. Exam reveals no gallop.    No murmur heard.  Pulmonary/Chest: Effort normal. He is intubated. No respiratory distress. He has decreased breath sounds in the right lower field and the left lower field.   Abdominal: Soft.   Musculoskeletal: He exhibits edema.   Neurological:   sedated   Skin: Skin is warm.       Quality Measures:     Reviewed items::  EKG reviewed, Labs reviewed, Medications reviewed and Radiology images reviewed  Ceballos catheter::  One or Two Days Post Surgery (Day of Surgery being Day 0) and Critically Ill - Requiring Accurate Measurement of Urinary Output  Central line in place:  Need for access and Vasopressors  DVT prophylaxis pharmacological::   Contraindicated - High bleeding risk  DVT prophylaxis - mechanical:  SCDs  Ulcer Prophylaxis::  Yes      Assessment/Plan:  POD 1 - HOTN- IABP 1:2, epi/jazz, vent- peep inc this am, keep CT/ADOLPH jenkins    Patient seen, examined and plan reviewed with midlevel provider. I agree with the plan.      Active Hospital Problems    Diagnosis   • CAD (coronary artery disease) [I25.10]   • CHF (congestive heart failure) (CMS-HCC) [I50.9]

## 2017-12-23 NOTE — PROGRESS NOTES
Patient became tachycardic as he woke up and was being AV paced in the 110's, this dropped his SBP to the 60s. The pacemaker was pacing both A and V despite the AV interval being increased to 260ms. V wires were disconnected and we are now only A pacing and letting the patient's intrinsic ventricular beat take place. This improved the patient's BP. Dr Ash called for updates around 1630 and we let him know of the pacing change.   Patient's HR returned to 80 with sedation, and we will continue to A pace

## 2017-12-23 NOTE — PROGRESS NOTES
Critical WBC level discussed with Dr. Evans.   Patient body temp not at threshold for pan culture per post-op cardiac surgery order set also discussed with Dr. Evans.  No additional orders, will pan culture if patient body temp reached threshold temp of 101.5 F.

## 2017-12-23 NOTE — PROGRESS NOTES
Patient is 100% A paced at 80, patient's underlying rhythm is sinus seymour with a rate of 62. Patient has occasional non-perfusing PVCs

## 2017-12-23 NOTE — PROGRESS NOTES
Lab called with critical result of WBC of 30.7 at 0430. Critical lab result read back to Lab.   Dr. Evans notified of critical lab result at 0445.  Critical lab result read back by Dr. Evans.

## 2017-12-23 NOTE — PROGRESS NOTES
Patient's oxygen in the mid 70-80's on APVcmv 26/550/12/80%. Patient becoming hypotensive, RT at bedside. Attempting to bag patient.

## 2017-12-23 NOTE — PROGRESS NOTES
Patient arrived to [facility] with anesthesia and OR RN post CABG x2 and on balloon pump. The balloon pump was reviewed with the profusion specialist and no changes were noted to be made. Pump is in an AP trigger 1:1 mode. Patient on epi and amicar, drips reviewed and swan was wedged. Orders to keep the patient on the vent for the night, mode was changed to APVcmv per Dr Landa. Patient was in respiratory acidosis at the time of arrival. Patient is being AV paced at a rate of 80

## 2017-12-23 NOTE — CONSULTS
DATE OF SERVICE:  12/22/2017    PULMONARY CRITICAL CARE CONSULTATION    REQUESTING PHYSICIAN:  Kiel Ash MD.    REASON FOR REQUEST:  Postoperative ventilator management.    HISTORY OF PRESENT ILLNESS:  This is a 52-year-old male with known past   medical history of diabetes, COPD, dyslipidemia, coronary artery disease, and   likely obstructive sleep apnea.  The patient was recently admitted here from   November 19 through the 24th with cough and dyspnea, at which point in time he   had a workup and identified of having underlying coronary artery disease with   a 100% proximal RCA with good collateral filling, 80 90% mid circ, 40%   proximal LAD.  He was also identified of having ejection fraction of 35% at   that time.  During his course, the patient was diuresed 14 liters with   significant improvement in his symptoms and subsequently set up for elective   2-vessel CABG.  Patient is currently status post LIMA to LAD and reverse   saphenous to major obtuse marginal branch.  Patient returns to the intensive   care unit on full mechanical ventilatory support, recovering from anesthesia   as well as having balloon pump in place as there was some difficulty getting   him off pump perioperatively.  Patient is currently hemodynamically intact on   0.1 mcg per kilogram per minute of epinephrine, insulin at 3 units per hour.    ALLERGIES:  ERYTHROMYCIN, PENICILLIN, AND SHELLFISH.    OUTPATIENT MEDICATIONS:  Lasix 20 mg daily, albuterol nebulizer p.r.n.    FAMILY HISTORY:  Unable to obtain.    PAST MEDICAL HISTORY:  As indicated above in HPI.    SOCIAL HISTORY:  Unable to obtain.    REVIEW OF SYSTEMS:  Unable to obtain.    PHYSICAL EXAMINATION:  VITAL SIGNS:  Temperature is 36.5, respiratory rate is 20, blood pressure   112/74, satting 95% on 50% FiO2.  GENERAL:  The patient recovering from anesthesia and full mechanical   ventilatory support.  HEENT:  Normocephalic, atraumatic.  Anicteric.  CARDIOVASCULAR:  Rate within  normal limits.  RESPIRATORY:  Diminished, scattered rhonchi.  ABDOMEN:  Obese, otherwise nondistended, soft, positive bowel sounds.  EXTREMITIES:  With trace bilateral pitting edema.  NEUROLOGIC:  Recovering from anesthesia.  PSYCHIATRIC:  Unable to assess.    LABORATORY DATA:  White count 13.6, H and H of 14 and 48, platelet count 266.    Chem panel unremarkable except alkaline phosphatase of 114.  ABG on arrival   to unit showed the pH 7.29, pCO2 of 59, pO2 of 85, SO2 of 95 on 100% FIO2.    Ionized calcium 1.08.  INR was 1.60, PTT 36.1.    IMAGING:  Chest x-ray with mild bibasilar atelectasis, perihilar opacities,   cardiomegaly, sternal wires in place, chest tube noted.    ASSESSMENT:  1.  Status post 2-vessel coronary artery bypass grafting on 12/22/2017.  2.  Acute hypoxic respiratory failure secondary to above.  3.  Coronary artery disease with multivessel disease.  4.  Cardiomyopathy with last reported ejection fraction of 35% and global   systolic dysfunction.  5.  Severely dilated right ventricle.  6.  Chronic obstructive pulmonary disease.  7.  Reported diabetes.  8.  Dyslipidemia.  9.  Clinically with obstructive sleep apnea.  10.  Incomplete medical database.    PLAN:  Again, this is a 52-year-old male with coronary artery disease and   systolic heart failure, here for 2-vessel CABG, currently on full mechanical   ventilatory support, recovering from anesthesia on 0.1 of epinephrine.  At   this time, the patient will maintain on full mechanical ventilatory support.    We will not do any weaning or SVTs at this time.  He does have a balloon pump   in place on epinephrine as pressor to maintain cardiac output as well as   systemic perfusion.  Patient will be on RT and O2 protocols.  Goal will be for   tomorrow to evaluate for spontaneous trials then as well as vent weaning at   that time and readiness for the potential of extubation.  Meanwhile, we will   titrate pressors in accordance to cardiac output  and systemic perfusion.  We   will monitor for any excessive blood loss via chest tube.  We utilized   Precedex for sedation.  Then, again keep patient on full mechanical   ventilatory support.    Prognosis is guarded.    Total critical care time not including billable procedures, 36 minutes.       ____________________________________     MD CORNELL Ngo / YOSVANY    DD:  12/22/2017 15:32:53  DT:  12/22/2017 16:01:50    D#:  [id number]  Job#:  [id number]

## 2017-12-23 NOTE — DIETARY
Nutrition Services:   Consult received for cardiac rehab diet instruction. Pt is currently intubated. RD will follow up for diet education, when appropriate    RD available prn.

## 2017-12-23 NOTE — CARE PLAN
Problem: Day of surgery post CABG/Heart valve replacement  Goal: Stabilization in immediate post op period    Intervention: VS q 15 min x 4 hours, then q 1 hour. Include temperature immediately upon arrival. Check CO/CI q 2-4 hours and PRN  Recorded in Epic   Intervention: If radial artery used, elevate arm, no BP checks or needle sticks from affected arm, monitor ulnar pulse and capillary refill  n/a  Intervention: First post op hour labs and diagnostics per MD order  See results  Intervention: Serum K q 6 hours x 24 hours.  ABG and CBC prn.  K+ replaced per protocol  Intervention: For FSBS greater than 130, start Post Cardiac Surgery Insulin Drip Protocol  Insulin started in [facility]  Intervention: FSBS frequency as per Cardiac Surgery Insulin Drip Protocol  q1h  Intervention: For patients on Beta Blockers: verify dose given prior to surgery or within 6 hours after arrival to the unit  BB held for HB  Intervention: Chest tube to 20 cm suction, record CT drainage with VS  Chest tube to suction, see I&O  Intervention: For CT drainage > 300 cc in first post op hour and/or 150 cc in subsequent hours: platelets, coag screen, fibrinogen, H&H per order  Pt had a shift total of 160  Intervention: Titrate and wean off vasoactive drips per patient's condition and per MD order while maintaining SBP  mmHg per MD order  Unable to wean off Epi  Intervention: VAP protocol in place  complete  Intervention: Wean from vent per protocol (see protocol), extubation goal with 2-6 hours post op.  Brandl ordered to rest on vent  Intervention: IS q 1 hour while awake post extubation  n/a  Intervention: Bedrest until extubated and groin lines out  Pt on bed rest  Intervention: Out of bed, dangle 4 hours post extubation  Pt on bed rest  Intervention: Up in chair 4 hours, day of extubation  Pt on bed rest  Intervention: Maintain all original surgical dressings for 24 hours  Complete  Intervention: Clear liquids post extubation, advance as  tolerated  PT NPO  Intervention: Discontinue Clinton tess and arterial line 12-18 hours post op if hemodynamically stable and off vasoactive drips  Fresh post op  Intervention: A-Fib and DVT prophylaxis per MD order or contraindications documented (refer to DVT/VTE problem on Care Plan)  n/a  Intervention: Amiodarone protocol per MD order  n/a

## 2017-12-23 NOTE — PROGRESS NOTES
Patient arrived to [facility] with anesthesia and OR RN post CABG x2 and on balloon pump. The balloon pump was reviewed with the profusion specialist and no changes were noted to be made. Pump is in an AP trigger 1:1 mode. Patient on epi and amicar, drips reviewed and swan was wedged. Orders to keep the patient on the vent for the night, mode was changed to APVcmv per Dr Landa. Patient was in respiratory acidosis at the time of arrival. Patient is being AV paced at a rate of 80

## 2017-12-23 NOTE — PROGRESS NOTES
Dr. Guadarrama at bedside and updated on current patient current hemodynamics and what led up to patient coding. No new orders received.     Patient having sanguinous drainage out of upper EVH site on LLE. Thigh is bruised and hematoma present. Dr. Guadarrama aware. Orders to wrap site with elastic wrap.

## 2017-12-23 NOTE — CARE PLAN
Problem: Post Op Day 1 CABG/Heart Valve Replacement  Goal: Optimal care of the post op CABG/heart valve replacement Post Op Day 1  POD 1 CABG x2    IABP in place, on mechanical ventilation, 100% Atrial pacing, on epi infusion.  Intervention: EKG and CXR completed  Completed, see results review  Intervention: All valve patients: PT/INR daily  n/a  Intervention: Antibiotics are discontinued within 24 hours of anesthesia end time unless indication documented for continuation beyond 24 hours  Completed  Intervention: Daily Weights  Per bed scale:152.5 kg  Intervention: Up in chair for all meals  N/a, IABP in place, mechanically ventilated  Intervention: Ambulate in am if stable. First ambulation is 25 feet. Repeat x 3 as tolerated.  Ambulate again before bed.  N/a, IABP in place, mechanically ventilated  Intervention: Discontinue jenkins catheter unless documented reason for continuation  Patient remains mechanically vented with IABP  Intervention: Remove original surgical dressing after 24 hrs, leave open to air unless otherwise specified by physician  Patient remains mechanically ventilated  Intervention: Consider chest tube removal by MD  CT in place at this time  Intervention: IS q 1 hour while awake and record best IS volume  N/a, IABP in place, mechanically ventilated  Intervention: Saline lock IV  Patient requiring vasopressor support  Intervention: Transfer to [facility] status, begin VS q 4 hours  N/a, IABP in place, mechanically ventilated  Intervention: After 24th hour post-anesthesia end time, transition patient to Cardiac Surgery SQ Insulin Protocol  Subcutaneous insulin protocol ordered per protocol to begin at 1200.  Intervention: If patient is CABG or on home beta-blocker, start Beta-Blocker on POD 1 or POD 2 or contraindication documented  Patient requiring vasopressor support and 100% pacer support. Underlying rhythm is 1st degree HB with rates in low 60's.

## 2017-12-23 NOTE — PROGRESS NOTES
Pulmonary Critical Care Progress Note      Date of Service: 12/23/2017    Chief Complaint: CAD, 2V CABG    History of Present Illness: 52-year-old male with known past   medical history of diabetes, COPD, dyslipidemia, coronary artery disease, and   likely obstructive sleep apnea.  The patient was recently admitted here from   November 19 through the 24th with cough and dyspnea, at which point in time he   had a workup and identified of having underlying coronary artery disease with   a 100% proximal RCA with good collateral filling, 80 90% mid circ, 40%   proximal LAD.  He was also identified of having ejection fraction of 35% at   that time.  During his course, the patient was diuresed 14 liters with   significant improvement in his symptoms and subsequently set up for elective   2-vessel CABG.  Patient is currently status post LIMA to LAD and reverse   saphenous to major obtuse marginal branch.  Patient returns to the intensive   care unit on full mechanical ventilatory support, recovering from anesthesia   as well as having balloon pump in place as there was some difficulty getting   him off pump perioperatively.  Patient is currently hemodynamically intact on   0.1 mcg per kilogram per minute of epinephrine, insulin at 3 units per hour.           Interval Events:  24 hour interval history reviewed   Tm 100.4  +4.1L over last 24hr  Cxr, per my interpretation, mildly improved aeration, cardiomegaly, bb atx  Wbc 30 (13)  Hb 12  Na 139  K 4.1  Cr 0.98  Abg 7.43/36/79/96 on 60%  precedex 1.5  Epinephrine 0.08mcg/kg/min  Propofol 20  Insulin 1u/hr    Addendum  - Pt having repeated bouts of hypoxia, peep adjusted from 8->10->12, on 100% fio2, PIP mid 20s, minimal secretions on suctioning. cxr with incr rt basilar consolidation.   - Pt later had episode of hypoxia followed by PEA arrest requiring CPR x 1 min prior to ROSC  - I put US to pt chest and noted very dilated LV with severe hypokinesis on epinephrine gtt, no  significant pericardial effusion  - post code cxr with ongoing right basilar consolidation  - performed bronch and removed moderate amount of mucous plugs in right lower lobe  - I discussed care and interval changes at bedside with Dr Guadarrama   - continuing to titrate ventilator, peep down to 10, fio2 90% at present  - Pt remains on IABP 1:1, epinephrine infusion, weaned off neosynephrine infusion      Physical Exam   Constitutional: He appears well-developed and well-nourished.   HENT:   Head: Normocephalic.   Eyes: Pupils are equal, round, and reactive to light. No scleral icterus.   Neck: No tracheal deviation present.   Cardiovascular:   tachycardic   Pulmonary/Chest:   Diminished throughout   Abdominal: Soft. He exhibits no distension.   Musculoskeletal: He exhibits edema.   Neurological: No cranial nerve deficit.   Skin: Skin is warm and dry.   Psychiatric:   sedate   Nursing note and vitals reviewed.        PFSH:  No change.    Respiratory:  FiO2: 60  Pulse Oximetry: 98 %  Chest Tube Drains:  Francisco Javier Girard 1: 20 ml  Mediastinal Chest Tube 1: 10 ml      Recent Labs      12/22/17   1841  12/22/17   2309  12/23/17   0400   ISTATAPH  7.424  7.437  7.436   ISTATAPCO2  39.9*  35.1  36.1   ISTATAPO2  70  78  79   ISTATATCO2  27  25  25   [id number]  94  96  96   ISTATARTHCO3  26.1*  23.7  24.3   ISTATARTBE  2  0  0   ISTATTEMP  36.7 C  37.2 C  37.8 C   ISTATFIO2  40  60  60   ISTATSPEC  Arterial  Arterial  Arterial   ISTATAPHTC  7.428  7.434  7.424   [id number]  68  79  83       HemoDynamics:  Pulse: (!) 129, Heart Rate (Monitored): 80  Blood Pressure: 123/75, Arterial BP: 114/59, NIBP: 109/55  CVP (mm Hg): (!) 9 MM HG            Neuro:  GCS Total Enrique Coma Score: 8         Fluids:  Intake/Output       12/21/17 0700 - 12/22/17 0659 12/22/17 0700 - 12/23/17 0659 12/23/17 0700 - 12/24/17 0659      9115-3496 6834-9606 Total 2788-2581 6317-3978 Total 3438-6622 0904-8842 Total       Intake    P.O.  720  -- 016  --   -- --  --  -- --    P.O. 720 -- 720 -- -- -- -- -- --    I.V.  --  -- --  2797.7  2451.5 5249.2  --  -- --    Precedex Volume -- -- -- 170 619.2 789.2 -- -- --    Propofol Volume -- -- -- -- 161.9 161.9 -- -- --    Insulin Volume -- -- -- 39.7 76.5 116.2 -- -- --    Epinephrine Volume -- -- -- 237.9 504 741.9 -- -- --    IV Piggyback Volume -- -- -- 200 550 750 -- -- --    NS TKO -- -- -- 100 40 140 -- -- --    LR -- -- -- 2050 -- 2050 -- -- --    IV Volume (Plasmalyte) -- -- -- -- 500 500 -- -- --    Blood  --  -- --  2200  -- 2200  --  -- --    Cell Saver Volume (mL) -- -- -- 1550 -- 1550 -- -- --    FFP Total Volume (Non-Barcoded) -- -- -- 450 -- 450 -- -- --    Platelets Total Volume (Non-Barcoded) -- -- -- 200 -- 200 -- -- --    Total Intake 720 -- 720 4997.7 2451.5 7449.2 -- -- --       Output    Urine  2875  1450 4325  1330  1310 2640  --  -- --    Indwelling Cathether -- -- -- 1330 1310 2640 -- -- --    Void (ml) 2875 1450 4325 -- -- -- -- -- --    Drains  --  -- --  391  266 657  --  -- --    Mediastinal Chest Tube 1 -- -- -- 156 186 342 -- -- --    Francisco Javier Girard 1 -- -- -- 235 80 315 -- -- --    Total Output 2875 1450 4325 1721 1576 3297 -- -- --       Net I/O     -2155 -1450 -3605 3276.7 875.5 4152.2 -- -- --        Weight: (!) 152.5 kg (336 lb 3.2 oz)  Recent Labs      12/22/17 0347 12/22/17   1400   12/23/17   0025  12/23/17   0400  12/23/17   0500   SODIUM  137   --    --    --   139   --    POTASSIUM  4.5  5.1   < >  3.9  4.1  4.1   CHLORIDE  99   --    --    --   108   --    CO2  29   --    --    --   22   --    BUN  20   --    --    --   20   --    CREATININE  1.16   --    --    --   0.98   --    MAGNESIUM   --   2.1   --    --    --    --    CALCIUM  9.8   --    --    --   7.9*   --     < > = values in this interval not displayed.       GI/Nutrition:  Liver Function  Recent Labs      12/22/17 0347 12/23/17   0400   ALTSGPT  6   --    ASTSGOT  15   --    ALKPHOSPHAT  114*   --    TBILIRUBIN   0.9   --    GLUCOSE  99  108*       Heme:  Recent Labs      17   0347  17   1400  17   0400   RBC  6.16*   --   4.88   HEMOGLOBIN  14.9   --   12.2*   HEMATOCRIT  48.8   --   38.6*   PLATELETCT  266  316  370   PROTHROMBTM  15.4*  18.7*   --    APTT  32.9  36.1*   --    INR  1.25*  1.60*   --        Infectious Disease:  Monitored Temp  Av.1 °C (98.8 °F)  Min: 36.3 °C (97.3 °F)  Max: 38 °C (100.4 °F)  Temp  Av.2 °C (98.9 °F)  Min: 36.5 °C (97.7 °F)  Max: 37.8 °C (100 °F)  Micro: cultures reviewed  Recent Labs      17   0347  17   0400   WBC  13.6*  30.7*   NEUTSPOLYS  78.30*   --    LYMPHOCYTES  9.40*   --    MONOCYTES  6.90   --    EOSINOPHILS  3.30   --    BASOPHILS  1.30   --    ASTSGOT  15   --    ALTSGPT  6   --    ALKPHOSPHAT  114*   --    TBILIRUBIN  0.9   --      Current Facility-Administered Medications   Medication Dose Frequency Provider Last Rate Last Dose   • potassium chloride in water (KCL) ivpb 10 mEq  10 mEq Once Radha Martinez A.P.N.   Stopped at 17 0658   • Respiratory Care per Protocol   Continuous RT Silvia Preston A.P.N.       • NS infusion   Continuous Silvia Preston A.P.N.   500 mL at 17 1411   • K+ Scale: Goal of 4.5  1 Each Q6HRS Silvia Preston A.P.N.   1 Each at 17 0600   • Pharmacy Consult Request ...Pain Management Review 1 Each  1 Each PRN Silvia Preston A.P.N.       • docusate sodium (COLACE) capsule 100 mg  100 mg QAM Silvia Preston A.P.N.   Stopped at 17 1345    And   • senna-docusate (PERICOLACE or SENOKOT S) 8.6-50 MG per tablet 1 Tab  1 Tab Nightly Silvia M Preston, A.P.N.   Stopped at 17 2100    And   • senna-docusate (PERICOLACE or SENOKOT S) 8.6-50 MG per tablet 1 Tab  1 Tab Q24HRS PRN Silvia Preston, A.P.N.        And   • lactulose 20 GM/30ML solution 30 mL  30 mL Q24HRS PRN Silvia Preston, A.P.N.        And   • bisacodyl (DULCOLAX) suppository 10 mg  10 mg Q24HRS PRN Silvia Preston, A.P.N.        And    • fleet enema 133 mL  1 Each Once PRN [facility] Mohan, A.P.N.       • enoxaparin (LOVENOX) inj 40 mg  40 mg DAILY Silvia  Mohan, A.P.N.       • mupirocin (BACTROBAN) 2 % ointment 1 Application  1 Application BID [facility] Mohan A.P.N.   1 Application at 12/22/17 2053   • aspirin EC (ECOTRIN) tablet 81 mg  81 mg DAILY Silvia  Mohan, A.P.N.       • clopidogrel (PLAVIX) tablet 75 mg  75 mg DAILY [facility] Mohan, A.P.N.       • electrolyte-A (PLASMALYTE-A) infusion   PRN [facility] Mohan A.P.N.       • oxycodone immediate release (ROXICODONE) tablet 5 mg  5 mg Q3HRS PRN [facility] Mohan, A.P.N.       • oxycodone immediate release (ROXICODONE) tablet 10 mg  10 mg Q3HRS PRN [facility] Mohan, A.P.N.       • morphine (pf) 4 mg/ml injection 4 mg  4 mg Q3HRS PRN [facility] Mohan, A.P.N.       • tramadol (ULTRAM) 50 MG tablet 50 mg  50 mg Q4HRS PRN [facility] Mohan, A.P.N.       • midazolam (VERSED) 2 MG/2ML injection 0.5-2 mg  0.5-2 mg Q HOUR PRN [facility] Mohan, A.P.N.   2 mg at 12/23/17 0232   • sodium bicarbonate 8.4 % injection 50 mEq  50 mEq Q HOUR PRN [facility] Mohan, A.P.N.       • morphine (pf) 4 mg/ml injection 4 mg  4 mg Q HOUR PRN [facility] Mohan, A.P.N.   4 mg at 12/23/17 0239   • ondansetron (ZOFRAN) syringe/vial injection 4 mg  4 mg Q6HRS PRN Silvia  Mohan, A.P.N.        Or   • prochlorperazine (COMPAZINE) injection 10 mg  10 mg Q6HRS PRN [facility] Mohan, A.P.N.        Or   • promethazine (PHENERGAN) suppository 25 mg  25 mg Q6HRS PRN Silviahome Preston, A.P.N.       • acetaminophen (TYLENOL) tablet 650 mg  650 mg Q4HRS PRN SUSI Mendoza.P.N.        Or   • acetaminophen (TYLENOL) suppository 650 mg  650 mg Q4HRS PRN Silvia Preston A.P.N.       • mag hydrox-al hydrox-simeth (MAALOX PLUS ES or MYLANTA DS) suspension 30 mL  30 mL Q4HRS PRN Silvia Preston, A.P.N.       • diphenhydrAMINE (BENADRYL) tablet/capsule 25 mg  25 mg HS PRN - MR X 1 Silvia Preston, A.P.N.       • insulin regular human (HUMULIN/NOVOLIN R) 62.5 Units  in  mL infusion per protocol  1-6 Units/hr Continuous Kiel Ash M.D. 4 mL/hr at 12/23/17 0605 1 Units/hr at 12/23/17 0605    And   • insulin regular (HUMULIN R) injection 0-14 Units  0-14 Units Once Kiel Ash M.D.        And   • insulin regular (HUMULIN R) injection 0-10 Units  0-10 Units PRN Kiel Ash M.D.   1 Units at 12/22/17 1742    And   • dextrose 50% (D50W) injection 25 mL  25 mL PRN Kiel Ash M.D.       • insulin lispro (HUMALOG) injection 0-20 Units  0-20 Units PRN Kiel Ash M.D.       • epinephrine 1 mg/mL(1:1000) 8 mg in  mL Infusion  0-0.2 mcg/kg/min Continuous Kiel Ash M.D. 46 mL/hr at 12/23/17 0307 0.08 mcg/kg/min at 12/23/17 0307   • dexmedetomidine (PRECEDEX) 400 mcg/100mL premix infusion  0.1-1.5 mcg/kg/hr Continuous Silvia Preston A.P.N. 57.5 mL/hr at 12/23/17 0326 1.5 mcg/kg/hr at 12/23/17 0326   • propofol (DIPRIVAN) injection  0-80 mcg/kg/min Continuous Silvia Preston A.P.N. 18.4 mL/hr at 12/23/17 0600 20 mcg/kg/min at 12/23/17 0600   • Magnesium Sulfate in D5W IVPB premix 1 g  1 g DAILY Silvia Preston A.P.N.   Stopped at 12/22/17 1707   • SODIUM CHLORIDE 0.9 % IV SOLN           • albuterol inhaler 2 Puff  2 Puff Q4H PRN (RT) Kiel Ash M.D.       • carvedilol (COREG) tablet 6.25 mg  6.25 mg BID WITH MEALS Rayne Mcdowell, A.P.R.N.   Stopped at 12/22/17 1730   • lisinopril (PRINIVIL) tablet 5 mg  5 mg Q DAY Rayne Mcdowell A.P.R.N.   Stopped at 12/22/17 0900   • alprazolam (XANAX) tablet 0.25 mg  0.25 mg Q6HRS PRN SUZANNA Alfred       • rosuvastatin (CRESTOR) tablet 20 mg  20 mg Q EVENING KAMALA MendozaPAlexNAlex   Stopped at 12/22/17 2100   • ipratropium-albuterol (DUONEB) nebulizer solution 3 mL  3 mL Q4H PRN (RT) Kiel Ash M.D.   3 mL at 12/22/17 0340     Last reviewed on 12/22/2017  7:07 AM by Katie Aly R.N.    Quality  Measures:  Labs reviewed, Medications reviewed and Radiology images  reviewed                      Assessment/Plan:  -  Status post 2-vessel coronary artery bypass grafting on 12/22/2017.   - remains on IABP at 1:1   - continuing to titrate pressors (epi/jazz)   - chest tubes per CTS  -  Acute hypoxic respiratory failure secondary to above.   - intubated 12/22   - full vent support   - no SBT given instability   - wean fio2 only, keep peep at 10   - monitor volume status   - diurese once more stable  -  Coronary artery disease with multivessel disease.   - s/p Cab  -  Cardiomyopathy with last reported ejection fraction of 35% and global   systolic dysfunction.  -  Severely dilated right ventricle.  -  Chronic obstructive pulmonary disease.  -  Reported diabetes.   - insulin gtt  -  Dyslipidemia.  -  Clinically with obstructive sleep apnea.  -  Incomplete medical database.      Discussed patient condition and risk of morbidity and/or mortality with Family, RN, RT, Therapies, Pharmacy and [facility].    The patient remains critically ill.  Critical care time = 75 minutes in directly providing and coordinating critical care and extensive data review.  No time overlap and excludes procedures.

## 2017-12-24 ENCOUNTER — APPOINTMENT (OUTPATIENT)
Dept: RADIOLOGY | Facility: MEDICAL CENTER | Age: 52
DRG: 003 | End: 2017-12-24
Attending: INTERNAL MEDICINE
Payer: MEDICARE

## 2017-12-24 LAB
ANION GAP SERPL CALC-SCNC: 9 MMOL/L (ref 0–11.9)
BUN SERPL-MCNC: 21 MG/DL (ref 8–22)
CALCIUM SERPL-MCNC: 8.2 MG/DL (ref 8.5–10.5)
CHLORIDE SERPL-SCNC: 111 MMOL/L (ref 96–112)
CO2 SERPL-SCNC: 20 MMOL/L (ref 20–33)
CREAT SERPL-MCNC: 0.97 MG/DL (ref 0.5–1.4)
EKG IMPRESSION: NORMAL
ERYTHROCYTE [DISTWIDTH] IN BLOOD BY AUTOMATED COUNT: 59.4 FL (ref 35.9–50)
GFR SERPL CREATININE-BSD FRML MDRD: >60 ML/MIN/1.73 M 2
GLUCOSE BLD-MCNC: 133 MG/DL (ref 65–99)
GLUCOSE BLD-MCNC: 136 MG/DL (ref 65–99)
GLUCOSE BLD-MCNC: 143 MG/DL (ref 65–99)
GLUCOSE BLD-MCNC: 143 MG/DL (ref 65–99)
GLUCOSE BLD-MCNC: 145 MG/DL (ref 65–99)
GLUCOSE SERPL-MCNC: 147 MG/DL (ref 65–99)
GRAM STN SPEC: NORMAL
HCT VFR BLD AUTO: 36.8 % (ref 42–52)
HGB BLD-MCNC: 11.3 G/DL (ref 14–18)
MAGNESIUM SERPL-MCNC: 2.1 MG/DL (ref 1.5–2.5)
MCH RBC QN AUTO: 24.1 PG (ref 27–33)
MCHC RBC AUTO-ENTMCNC: 30.7 G/DL (ref 33.7–35.3)
MCV RBC AUTO: 78.6 FL (ref 81.4–97.8)
PLATELET # BLD AUTO: 246 K/UL (ref 164–446)
PMV BLD AUTO: 10.3 FL (ref 9–12.9)
POTASSIUM SERPL-SCNC: 3.3 MMOL/L (ref 3.6–5.5)
POTASSIUM SERPL-SCNC: 3.8 MMOL/L (ref 3.6–5.5)
RBC # BLD AUTO: 4.68 M/UL (ref 4.7–6.1)
RHODAMINE-AURAMINE STN SPEC: NORMAL
SIGNIFICANT IND 70042: NORMAL
SIGNIFICANT IND 70042: NORMAL
SITE SITE: NORMAL
SITE SITE: NORMAL
SODIUM SERPL-SCNC: 140 MMOL/L (ref 135–145)
SOURCE SOURCE: NORMAL
SOURCE SOURCE: NORMAL
TRIGL SERPL-MCNC: 153 MG/DL (ref 0–149)
WBC # BLD AUTO: 32.4 K/UL (ref 4.8–10.8)

## 2017-12-24 PROCEDURE — 71010 DX-CHEST-PORTABLE (1 VIEW): CPT

## 2017-12-24 PROCEDURE — 700101 HCHG RX REV CODE 250: Performed by: INTERNAL MEDICINE

## 2017-12-24 PROCEDURE — 770022 HCHG ROOM/CARE - ICU (200)

## 2017-12-24 PROCEDURE — 700105 HCHG RX REV CODE 258: Performed by: THORACIC SURGERY (CARDIOTHORACIC VASCULAR SURGERY)

## 2017-12-24 PROCEDURE — 93010 ELECTROCARDIOGRAM REPORT: CPT | Performed by: INTERNAL MEDICINE

## 2017-12-24 PROCEDURE — 03HC33Z INSERTION OF INFUSION DEVICE INTO LEFT RADIAL ARTERY, PERCUTANEOUS APPROACH: ICD-10-PCS | Performed by: INTERNAL MEDICINE

## 2017-12-24 PROCEDURE — 85027 COMPLETE CBC AUTOMATED: CPT

## 2017-12-24 PROCEDURE — 80048 BASIC METABOLIC PNL TOTAL CA: CPT

## 2017-12-24 PROCEDURE — 94003 VENT MGMT INPAT SUBQ DAY: CPT

## 2017-12-24 PROCEDURE — 700102 HCHG RX REV CODE 250 W/ 637 OVERRIDE(OP): Performed by: NURSE PRACTITIONER

## 2017-12-24 PROCEDURE — 94770 HCHG CO2 EXPIRED GAS DETERMINATION: CPT

## 2017-12-24 PROCEDURE — 700111 HCHG RX REV CODE 636 W/ 250 OVERRIDE (IP): Performed by: THORACIC SURGERY (CARDIOTHORACIC VASCULAR SURGERY)

## 2017-12-24 PROCEDURE — A9270 NON-COVERED ITEM OR SERVICE: HCPCS | Performed by: CLINICAL NURSE SPECIALIST

## 2017-12-24 PROCEDURE — 302136 NUTRITION PUMP: Performed by: INTERNAL MEDICINE

## 2017-12-24 PROCEDURE — P9045 ALBUMIN (HUMAN), 5%, 250 ML: HCPCS | Performed by: NURSE PRACTITIONER

## 2017-12-24 PROCEDURE — 700105 HCHG RX REV CODE 258: Performed by: CLINICAL NURSE SPECIALIST

## 2017-12-24 PROCEDURE — 700105 HCHG RX REV CODE 258: Performed by: NURSE PRACTITIONER

## 2017-12-24 PROCEDURE — A9270 NON-COVERED ITEM OR SERVICE: HCPCS | Performed by: NURSE PRACTITIONER

## 2017-12-24 PROCEDURE — 83735 ASSAY OF MAGNESIUM: CPT

## 2017-12-24 PROCEDURE — 700101 HCHG RX REV CODE 250: Performed by: CLINICAL NURSE SPECIALIST

## 2017-12-24 PROCEDURE — 84478 ASSAY OF TRIGLYCERIDES: CPT

## 2017-12-24 PROCEDURE — 82962 GLUCOSE BLOOD TEST: CPT | Mod: 91

## 2017-12-24 PROCEDURE — 700102 HCHG RX REV CODE 250 W/ 637 OVERRIDE(OP): Performed by: CLINICAL NURSE SPECIALIST

## 2017-12-24 PROCEDURE — 700111 HCHG RX REV CODE 636 W/ 250 OVERRIDE (IP): Performed by: NURSE PRACTITIONER

## 2017-12-24 PROCEDURE — 93005 ELECTROCARDIOGRAM TRACING: CPT | Performed by: NURSE PRACTITIONER

## 2017-12-24 PROCEDURE — 700111 HCHG RX REV CODE 636 W/ 250 OVERRIDE (IP): Performed by: CLINICAL NURSE SPECIALIST

## 2017-12-24 PROCEDURE — 700102 HCHG RX REV CODE 250 W/ 637 OVERRIDE(OP): Performed by: INTERNAL MEDICINE

## 2017-12-24 PROCEDURE — A9270 NON-COVERED ITEM OR SERVICE: HCPCS | Performed by: INTERNAL MEDICINE

## 2017-12-24 PROCEDURE — 700111 HCHG RX REV CODE 636 W/ 250 OVERRIDE (IP): Performed by: INTERNAL MEDICINE

## 2017-12-24 PROCEDURE — 84132 ASSAY OF SERUM POTASSIUM: CPT

## 2017-12-24 PROCEDURE — 700105 HCHG RX REV CODE 258

## 2017-12-24 RX ORDER — ALBUMIN, HUMAN INJ 5% 5 %
25 SOLUTION INTRAVENOUS ONCE
Status: COMPLETED | OUTPATIENT
Start: 2017-12-24 | End: 2017-12-24

## 2017-12-24 RX ORDER — SODIUM CHLORIDE 9 MG/ML
INJECTION, SOLUTION INTRAVENOUS
Status: ACTIVE
Start: 2017-12-24 | End: 2017-12-24

## 2017-12-24 RX ORDER — ROCURONIUM BROMIDE 10 MG/ML
20 INJECTION, SOLUTION INTRAVENOUS ONCE
Status: COMPLETED | OUTPATIENT
Start: 2017-12-24 | End: 2017-12-24

## 2017-12-24 RX ORDER — FUROSEMIDE 10 MG/ML
20 INJECTION INTRAMUSCULAR; INTRAVENOUS ONCE
Status: COMPLETED | OUTPATIENT
Start: 2017-12-24 | End: 2017-12-24

## 2017-12-24 RX ORDER — SODIUM CHLORIDE 9 MG/ML
INJECTION, SOLUTION INTRAVENOUS
Status: COMPLETED
Start: 2017-12-24 | End: 2017-12-24

## 2017-12-24 RX ORDER — SODIUM CHLORIDE 9 MG/ML
500 INJECTION, SOLUTION INTRAVENOUS ONCE
Status: COMPLETED | OUTPATIENT
Start: 2017-12-24 | End: 2017-12-24

## 2017-12-24 RX ORDER — POTASSIUM CHLORIDE 29.8 MG/ML
40 INJECTION INTRAVENOUS ONCE
Status: COMPLETED | OUTPATIENT
Start: 2017-12-24 | End: 2017-12-24

## 2017-12-24 RX ORDER — FUROSEMIDE 10 MG/ML
80 INJECTION INTRAMUSCULAR; INTRAVENOUS ONCE
Status: COMPLETED | OUTPATIENT
Start: 2017-12-24 | End: 2017-12-24

## 2017-12-24 RX ADMIN — DEXMEDETOMIDINE HYDROCHLORIDE 1.5 MCG/KG/HR: 4 INJECTION, SOLUTION INTRAVENOUS at 15:25

## 2017-12-24 RX ADMIN — STANDARDIZED SENNA CONCENTRATE AND DOCUSATE SODIUM 1 TABLET: 8.6; 5 TABLET, FILM COATED ORAL at 21:12

## 2017-12-24 RX ADMIN — SODIUM CHLORIDE: 9 INJECTION, SOLUTION INTRAVENOUS at 15:01

## 2017-12-24 RX ADMIN — PROPOFOL 25 MCG/KG/MIN: 10 INJECTION, EMULSION INTRAVENOUS at 05:10

## 2017-12-24 RX ADMIN — INSULIN HUMAN 5 UNITS: 100 INJECTION, SUSPENSION SUBCUTANEOUS at 21:45

## 2017-12-24 RX ADMIN — INSULIN HUMAN 5 UNITS: 100 INJECTION, SUSPENSION SUBCUTANEOUS at 06:02

## 2017-12-24 RX ADMIN — FAMOTIDINE 20 MG: 10 INJECTION, SOLUTION INTRAVENOUS at 09:21

## 2017-12-24 RX ADMIN — PROPOFOL 25 MCG/KG/MIN: 10 INJECTION, EMULSION INTRAVENOUS at 00:45

## 2017-12-24 RX ADMIN — ALBUMIN (HUMAN) 25 G: 2.5 SOLUTION INTRAVENOUS at 13:09

## 2017-12-24 RX ADMIN — DEXMEDETOMIDINE HYDROCHLORIDE 1.5 MCG/KG/HR: 4 INJECTION, SOLUTION INTRAVENOUS at 08:14

## 2017-12-24 RX ADMIN — MIDAZOLAM HYDROCHLORIDE 2 MG: 1 INJECTION, SOLUTION INTRAMUSCULAR; INTRAVENOUS at 00:17

## 2017-12-24 RX ADMIN — POTASSIUM CHLORIDE 40 MEQ: 400 INJECTION, SOLUTION INTRAVENOUS at 16:58

## 2017-12-24 RX ADMIN — DEXMEDETOMIDINE HYDROCHLORIDE 1.5 MCG/KG/HR: 4 INJECTION, SOLUTION INTRAVENOUS at 22:30

## 2017-12-24 RX ADMIN — ROSUVASTATIN CALCIUM 20 MG: 20 TABLET, FILM COATED ORAL at 21:12

## 2017-12-24 RX ADMIN — AMIODARONE HYDROCHLORIDE 150 MG: 1.5 INJECTION, SOLUTION INTRAVENOUS at 16:45

## 2017-12-24 RX ADMIN — DEXMEDETOMIDINE HYDROCHLORIDE 1.5 MCG/KG/HR: 4 INJECTION, SOLUTION INTRAVENOUS at 02:47

## 2017-12-24 RX ADMIN — SODIUM CHLORIDE 500 ML: 9 INJECTION, SOLUTION INTRAVENOUS at 04:18

## 2017-12-24 RX ADMIN — DEXMEDETOMIDINE HYDROCHLORIDE 1.5 MCG/KG/HR: 4 INJECTION, SOLUTION INTRAVENOUS at 11:50

## 2017-12-24 RX ADMIN — PROPOFOL 25 MCG/KG/MIN: 10 INJECTION, EMULSION INTRAVENOUS at 18:00

## 2017-12-24 RX ADMIN — DEXMEDETOMIDINE HYDROCHLORIDE 1.5 MCG/KG/HR: 4 INJECTION, SOLUTION INTRAVENOUS at 00:55

## 2017-12-24 RX ADMIN — PROPOFOL 25 MCG/KG/MIN: 10 INJECTION, EMULSION INTRAVENOUS at 13:43

## 2017-12-24 RX ADMIN — MAGNESIUM SULFATE IN DEXTROSE 1 G: 10 INJECTION, SOLUTION INTRAVENOUS at 09:21

## 2017-12-24 RX ADMIN — FAMOTIDINE 20 MG: 10 INJECTION, SOLUTION INTRAVENOUS at 21:12

## 2017-12-24 RX ADMIN — DEXMEDETOMIDINE HYDROCHLORIDE 1.5 MCG/KG/HR: 4 INJECTION, SOLUTION INTRAVENOUS at 19:02

## 2017-12-24 RX ADMIN — DEXMEDETOMIDINE HYDROCHLORIDE 1.5 MCG/KG/HR: 4 INJECTION, SOLUTION INTRAVENOUS at 20:59

## 2017-12-24 RX ADMIN — EPINEPHRINE 0.03 MCG/KG/MIN: 1 INJECTION, SOLUTION INTRAMUSCULAR; SUBCUTANEOUS at 17:32

## 2017-12-24 RX ADMIN — CHLORHEXIDINE GLUCONATE 15 ML: 1.2 RINSE ORAL at 09:21

## 2017-12-24 RX ADMIN — DEXMEDETOMIDINE HYDROCHLORIDE 1.5 MCG/KG/HR: 4 INJECTION, SOLUTION INTRAVENOUS at 17:11

## 2017-12-24 RX ADMIN — MUPIROCIN 1 APPLICATION: 20 OINTMENT TOPICAL at 09:21

## 2017-12-24 RX ADMIN — POTASSIUM BICARBONATE 25 MEQ: 25 TABLET, EFFERVESCENT ORAL at 17:22

## 2017-12-24 RX ADMIN — DEXMEDETOMIDINE HYDROCHLORIDE 1.5 MCG/KG/HR: 4 INJECTION, SOLUTION INTRAVENOUS at 13:38

## 2017-12-24 RX ADMIN — FUROSEMIDE 80 MG: 10 INJECTION, SOLUTION INTRAMUSCULAR; INTRAVENOUS at 16:59

## 2017-12-24 RX ADMIN — MUPIROCIN 1 APPLICATION: 20 OINTMENT TOPICAL at 21:00

## 2017-12-24 RX ADMIN — ROCURONIUM BROMIDE 50 MG: 10 SOLUTION INTRAVENOUS at 12:11

## 2017-12-24 RX ADMIN — PROPOFOL 25 MCG/KG/MIN: 10 INJECTION, EMULSION INTRAVENOUS at 22:45

## 2017-12-24 RX ADMIN — INSULIN HUMAN 5 UNITS: 100 INJECTION, SUSPENSION SUBCUTANEOUS at 13:36

## 2017-12-24 RX ADMIN — FUROSEMIDE 20 MG: 10 INJECTION, SOLUTION INTRAMUSCULAR; INTRAVENOUS at 10:20

## 2017-12-24 RX ADMIN — CHLORHEXIDINE GLUCONATE 15 ML: 1.2 RINSE ORAL at 21:12

## 2017-12-24 RX ADMIN — SODIUM CHLORIDE 1000 ML: 9 INJECTION, SOLUTION INTRAVENOUS at 06:16

## 2017-12-24 RX ADMIN — DEXMEDETOMIDINE HYDROCHLORIDE 1.5 MCG/KG/HR: 4 INJECTION, SOLUTION INTRAVENOUS at 10:00

## 2017-12-24 RX ADMIN — PROPOFOL 25 MCG/KG/MIN: 10 INJECTION, EMULSION INTRAVENOUS at 09:59

## 2017-12-24 RX ADMIN — SODIUM CHLORIDE 500 ML: 9 INJECTION, SOLUTION INTRAVENOUS at 14:20

## 2017-12-24 RX ADMIN — DEXMEDETOMIDINE HYDROCHLORIDE 1.5 MCG/KG/HR: 4 INJECTION, SOLUTION INTRAVENOUS at 04:30

## 2017-12-24 RX ADMIN — DEXMEDETOMIDINE HYDROCHLORIDE 1.5 MCG/KG/HR: 4 INJECTION, SOLUTION INTRAVENOUS at 06:28

## 2017-12-24 NOTE — PROGRESS NOTES
Assessment completed.  Pedal pulses doppler bilateral.  PVCs still noted.  Balloon pump transducer zeroed.  ART line becoming increasingly difficult to maintain, when intervention completed, reads for appropriate for approximately 30 seconds or less, before becoming dampened.  When ART line reading appropriate, correlates with BP cuff and IABP monitor.  DESTINY de leon.  Dr Landa bedside, aware of ART line issues, plan for replacement today.  KAYLIE orozco to be updated when bedside.

## 2017-12-24 NOTE — PROGRESS NOTES
Patient returned 1:1 ratio on IABP at 1230 after approximately 10 minutes 1:2.  Ectopy increased and BP dropped while on 1:2.  Will continue to monitor patient closely.

## 2017-12-24 NOTE — RESPIRATORY CARE
Adult Ventilation Update    Total Vent Days: 2    Patient Lines/Drains/Airways Status    Active Airway     Name: Placement date: Placement time: Site: Days:    Airway Group ET Tube Oral 9.0 12/22/17   0753   Oral   2              No SBT done today because eof barriers to wean FIO2>60, PEEP >10. Pt current settings CMV R 26// Peep 10 FIO2 60%. Per PMa continue to wean FIO2 down but keep sats 95% and above.      1045 Bronch pt on bedside

## 2017-12-24 NOTE — CARE PLAN
Problem: Post op day 2 CABG/Heart Valve Replacement  Goal: Optimal care of the post op CABG/heart valve replacement post op day 2    Intervention: FSBS: when 2 consecutive BS < 130 after post op day 2, discontinue FSBS unless patient is insulin dependent diabetic  Still appropriate    Intervention: Daily Weights  Done in AM  Intervention: Up in chair for all meals  Patient on IABP  Intervention: Ambulate, increasing the distance each time x 3 and before bed  IABP  Intervention: Consider pacer wire removal by MD  Dependent on Pacer  Intervention: Consider removal of jenkins and chest tube if not already done  Not appropriate at this time

## 2017-12-24 NOTE — THERAPY
POD 2; remains on vent and IABP in place; not medically appropriate for initiation of phase I cardiac rehab; will continue to monitor

## 2017-12-24 NOTE — DIETARY
"Nutrition Support Assessment - Male    Joshua Medrano is a 52 y.o. male with admitting DX of CHF    Pertinent History: diabetes, COPD, dyslipidemia, coronary artery disease, and likely obstructive sleep apnea  Allergies:  Erythromycin; Cillins [penicillins]; and Shellfish allergy    Height: 195.6 cm (6' 5.01\")  Weight: (!) 153.4 kg (338 lb 3 oz)  Ideal Body Weight: 94.3 kg (208 lb)  Percent Ideal Body Weight: 162.6  Body mass index is 40.09 kg/m².     Pertinent Labs: Glucose 147, POC glucose 143, 133, 140  Pertinent Medications: albumin human 5% solution, coreg, peridex, plavix, colace, pericolace, lovenox, pepcid, humalog, humulin, prinivil, crestor, precedex, epinephrine, jazz-synephrine, propofol @ 23 ml/hr (607 kcals)  Pertinent Fluids: NS infusion @ 10 ml/hr  Surgery / Procedures: Coronary artery bypass grafting x2 with left internal mammary to left anterior descending and reverse saphenous vein graft to the major obtuse marginal branch, temporary right ventricular and right atrial pacing leads, extracorporeal circulation, endoscopic saphenous vein harvesting and placement of intraaortic balloon pump, Emergent diagnostic and therapeutic bronchoscopy with aspiration and bal, Arterial Catheter Insertion   Skin: surgical incision left upper;groin leg, surgical incision chest, surgical incision left lower knee;leg  Last BM: 17    Estimated Needs: REE per MSJ x 1 = 2494 kcal/day (65-70%=0131-6123 kcal/day; RMR per [facility] (vent L/min 15.5, T max 24 hours 38.1) = 3023 kcal/day (65-70%=8256-5888 kcal/day)  Total Calories / day: 1687 - 2148  (Calories / k - 14)  Total Grams Protein / day: 142 - 189+  (Grams Protein / kg IBW: 1.5 - 2+) (Grams Protein / k.9 - 1.2+)  Total Fluids ml / day: 3841.8 ml         Assessment / Evaluation:   Consult received for TF assessment. Pt has a gastric cortrak placed. Pt is intubated, on pressors, and propofol @ 23 ml/hr which provides 607 kcal/day;RD will adjust TF as needed. " Pt with BMI >30, will hypocalorically feed pt per guidelines. Peptamen Intense VHP appropriate to meet pt's estimated protein needs.    Plan / Recommendation:   Start Peptamen Intense VHP @ 25 ml/hr and advance per protocol to 80 ml/hr (goal rate with propofol) to provide 1920 kcal + kcal from propofol (16 kcal/kg), 177 grams protein (1.2 gm/kg, 1.9 gm/kg IBW), and 1613 ml free water per day.   When propofol d/c'd increase TF to final goal rate of 85 ml/hr to provide 2040 kcal (13 kcal/kg), 188 grams protein (1.2 gm/kg, 2 gm/kg IBW), and 1714 ml free water per day.  Fluids per MD.     RD following.

## 2017-12-24 NOTE — PROGRESS NOTES
Pulmonary Critical Care Progress Note      Date of Service: 12/24/2017    Chief Complaint: CAD, 2V CABG    History of Present Illness: 52-year-old male with known past   medical history of diabetes, COPD, dyslipidemia, coronary artery disease, and   likely obstructive sleep apnea.  The patient was recently admitted here from   November 19 through the 24th with cough and dyspnea, at which point in time he   had a workup and identified of having underlying coronary artery disease with   a 100% proximal RCA with good collateral filling, 80 90% mid circ, 40%   proximal LAD.  He was also identified of having ejection fraction of 35% at   that time.  During his course, the patient was diuresed 14 liters with   significant improvement in his symptoms and subsequently set up for elective   2-vessel CABG.  Patient is currently status post LIMA to LAD and reverse   saphenous to major obtuse marginal branch.  Patient returns to the intensive   care unit on full mechanical ventilatory support, recovering from anesthesia   as well as having balloon pump in place as there was some difficulty getting   him off pump perioperatively.  Patient is currently hemodynamically intact on   0.1 mcg per kilogram per minute of epinephrine, insulin at 3 units per hour.           Interval Events:  24 hour interval history reviewed   Tm 100.6  +1.7L over last 24hr, -2.2L since admit  Pending cxr  Wbc 32 (30)  Hb 11  Na 140  K 3.8  Cr 0.97  Abg 7.44/29/61/93 on 60%  precedex 1.5  Epinephrine 0.05mcg/kg/min  neosynephrine 20mcg/min  Propofol 25  A Line no longer functioning  Remains on IABP 1:1      Review of Systems   Unable to perform ROS: Acuity of condition       Physical Exam   Constitutional: He appears well-developed and well-nourished.   HENT:   Head: Normocephalic.   Eyes: Pupils are equal, round, and reactive to light. No scleral icterus.   Neck: No tracheal deviation present.   Cardiovascular: Normal rate.    Pulmonary/Chest:    Diminished throughout   Abdominal: Soft. He exhibits no distension.   Musculoskeletal: He exhibits edema.   Neurological: No cranial nerve deficit.   Skin: Skin is warm and dry.   Psychiatric:   sedate   Nursing note and vitals reviewed.        PFSH:  No change.    Respiratory:  FiO2: 60  Pulse Oximetry: 100 %  Chest Tube Drains:  Francisco Javier Girard 1: 2 ml  Mediastinal Chest Tube 1: 10 ml      Recent Labs      12/23/17   0821  12/23/17   0928  12/23/17   1206  12/23/17   2103   ISTATAPH  7.363*  7.436  7.446  7.446   ISTATAPCO2  36.7  32.5  34.8  29.2   ISTATAPO2  79  50*  128*  61*   ISTATATCO2  22  23  25  21   NPDEOEO2LPW  95  87*  99  93   ISTATARTHCO3  20.9  21.9  24.0  20.1   ISTATARTBE  -4  -2  0  -3   ISTATTEMP  38.1 C  38.0 C  38.1 C  37.9 C   ISTATFIO2  60  80   --   60   ISTATSPEC  Arterial  Arterial  Arterial  Arterial   ISTATAPHTC  7.347*  7.421  7.430  7.433   WPYERMEZ4PT  85  53*  135*  65       HemoDynamics:  Pulse: 80, Heart Rate (Monitored): 68  Blood Pressure: (!) 195/113, Arterial BP: (!) 88/73, NIBP: 113/72  CVP (mm Hg): (!) 13 MM HG            Neuro:  GCS Total Enrique Coma Score: 8         Fluids:  Intake/Output       12/22/17 0700 - 12/23/17 0659 12/23/17 0700 - 12/24/17 0659 12/24/17 0700 - 12/25/17 0659      4328-9838 3247-7392 Total 1949-2194 6387-6749 Total 2534-1012 2197-8188 Total       Intake    I.V.  2797.7  2883.6 5681.2  1951.8  1665.6 3617.4  --  -- --    Precedex Volume 170 676.7 846.7 656.5 690 1346.5 -- -- --    Phenylephrine Volume -- -- -- 22.4 94.5 116.9 -- -- --    Propofol Volume -- 186.5 186.5 245.7 279.3 525 -- -- --    Insulin Volume 39.7 80.5 120.2 24 -- 24 -- -- --    Epinephrine Volume 237.9 550 787.9 538.5 415.8 954.3 -- -- --    IV Piggyback Volume 200 550 750 100 -- 100 -- -- --    NS  340 440 360 180 540 -- -- --    LR 2050 -- 2050 -- -- -- -- -- --    IV Volume (Plasmalyte) -- 500 500 -- -- -- -- -- --    IV Volume (Fentanyl ) -- -- -- 4.8 6 10.8 -- -- --     Blood  2200  -- 2200  --  -- --  --  -- --    Cell Saver Volume (mL) 1550 -- 1550 -- -- -- -- -- --    FFP Total Volume (Non-Barcoded) 450 -- 450 -- -- -- -- -- --    Platelets Total Volume (Non-Barcoded) 200 -- 200 -- -- -- -- -- --    Total Intake 4997.7 2883.6 7881.2 1951.8 1665.6 3617.4 -- -- --       Output    Urine  1330  1405 2735  965  695 1660  --  -- --    Indwelling Cathether 1330 1405 2735  -- -- --    Drains  391  271 662  130  52 182  --  -- --    Mediastinal Chest Tube 1 156 186 342 120 50 170 -- -- --    Francisco Javier Girard 1 235 85 320 10 2 12 -- -- --    Total Output 1721 1676 3397 8033 360 7941 -- -- --       Net I/O     3276.7 1207.6 4484.2 856.8 918.6 1775.4 -- -- --        Weight: (!) 153.4 kg (338 lb 3 oz)  Recent Labs      12/22/17   1400   12/23/17   0400  12/23/17   0500  12/23/17   1035  12/24/17   0420   SODIUM   --    --   139   --   138  140   POTASSIUM  5.1   < >  4.1  4.1  4.1  3.8   CHLORIDE   --    --   108   --   108  111   CO2   --    --   22   --   22  20   BUN   --    --   20   --   20  21   CREATININE   --    --   0.98   --   1.10  0.97   MAGNESIUM  2.1   --    --    --   2.5   --    CALCIUM   --    --   7.9*   --   7.9*  8.2*    < > = values in this interval not displayed.       GI/Nutrition:  Liver Function  Recent Labs      12/22/17   0347  12/23/17   0400  12/23/17   1035  12/24/17   0420   ALTSGPT  6   --   10   --    ASTSGOT  15   --   38   --    ALKPHOSPHAT  114*   --   90   --    TBILIRUBIN  0.9   --   0.9   --    GLUCOSE  99  108*  162*  147*       Heme:  Recent Labs      12/22/17   0347  12/22/17   1400  12/23/17   0400  12/23/17   1035  12/24/17   0420   RBC  6.16*   --   4.88  4.98  4.68*   HEMOGLOBIN  14.9   --   12.2*  11.9*  11.3*   HEMATOCRIT  48.8   --   38.6*  39.8*  36.8*   PLATELETCT  266  316  370  377  246   PROTHROMBTM  15.4*  18.7*   --   16.5*   --    APTT  32.9  36.1*   --   34.5   --    INR  1.25*  1.60*   --   1.36*   --        Infectious  Disease:  Monitored Temp  Av.1 °C (100.5 °F)  Min: 37.5 °C (99.5 °F)  Max: 38.4 °C (101.1 °F)  Temp  Av.9 °C (100.2 °F)  Min: 37.7 °C (99.9 °F)  Max: 38.1 °C (100.6 °F)  Micro: cultures reviewed  Recent Labs      17   0347  17   0400  17   1035  17   0420   WBC  13.6*  30.7*  30.5*  32.4*   NEUTSPOLYS  78.30*   --   83.50*   --    LYMPHOCYTES  9.40*   --   2.60*   --    MONOCYTES  6.90   --   5.20   --    EOSINOPHILS  3.30   --   0.00   --    BASOPHILS  1.30   --   0.00   --    ASTSGOT  15   --   38   --    ALTSGPT  6   --   10   --    ALKPHOSPHAT  114*   --   90   --    TBILIRUBIN  0.9   --   0.9   --      Current Facility-Administered Medications   Medication Dose Frequency Provider Last Rate Last Dose   • SODIUM CHLORIDE 0.9 % IV SOLN           • insulin NPH (HUMULIN,NOVOLIN) injection 5 Units  5 Units Q8HRS Silvia Preston, A.P.N.   5 Units at 17 0602   • insulin lispro (HUMALOG) injection 4 Units  4 Units TID AC Silvia Preston, A.P.N.   Stopped at 17 1700   • phenylephrine (KRISTIN-SYNEPHRINE) 80,000 mcg in  mL Infusion  0-50 mcg/min Continuous Radha Martinez, A.P.N. 7.5 mL/hr at 17 0216 20 mcg/min at 176   • famotidine (PEPCID) tablet 20 mg  20 mg BID Rodolfo Landa M.D.        Or   • famotidine (PEPCID) injection 20 mg  20 mg BID Rodolfo Landa M.D.   20 mg at 17   • chlorhexidine (PERIDEX) 0.12 % solution 15 mL  15 mL BID Rodolfo Landa M.D.   15 mL at 12/23/17 2112   • fentaNYL (SUBLIMAZE) 50 mcg/mL in 50mL (Continuous Infusion)   Continuous Rodolfo Landa M.D. 1 mL/hr at 17 1533 50 mcg/hr at 17 1533   • insulin lispro (HUMALOG) injection 0-15 Units  0-15 Units Q6HRS Radha Martinez A.P.N.   2 Units at 17 0601   • Respiratory Care per Protocol   Continuous RT Silvia Preston, A.P.N.       • NS infusion   Continuous Silvia Preston, A.P.N.   500 mL at 17 1411   • Pharmacy Consult Request ...Pain  Management Review 1 Each  1 Each PRN Silvia Preston A.P.N.       • docusate sodium (COLACE) capsule 100 mg  100 mg QAM Silvia Preston A.P.N.   Stopped at 12/22/17 1345    And   • senna-docusate (PERICOLACE or SENOKOT S) 8.6-50 MG per tablet 1 Tab  1 Tab Nightly Silvia Preston A.P.N.   Stopped at 12/22/17 2100    And   • senna-docusate (PERICOLACE or SENOKOT S) 8.6-50 MG per tablet 1 Tab  1 Tab Q24HRS PRN Silvia Preston A.P.N.        And   • lactulose 20 GM/30ML solution 30 mL  30 mL Q24HRS PRN Silvia Preston A.P.N.        And   • bisacodyl (DULCOLAX) suppository 10 mg  10 mg Q24HRS PRN Silvia Preston A.P.N.        And   • fleet enema 133 mL  1 Each Once PRN Silvia Preston A.P.N.       • enoxaparin (LOVENOX) inj 40 mg  40 mg DAILY Silvia Preston A.P.N.   Stopped at 12/23/17 2100   • mupirocin (BACTROBAN) 2 % ointment 1 Application  1 Application BID Silvia Preston A.P.N.   1 Application at 12/23/17 2120   • aspirin EC (ECOTRIN) tablet 81 mg  81 mg DAILY Silvia Preston A.P.N.   Stopped at 12/23/17 0900   • clopidogrel (PLAVIX) tablet 75 mg  75 mg DAILY Silvia Preston A.P.N.   Stopped at 12/23/17 0900   • electrolyte-A (PLASMALYTE-A) infusion   PRN Silvia Preston A.P.N.       • oxycodone immediate release (ROXICODONE) tablet 5 mg  5 mg Q3HRS PRN Silvia Preston A.P.N.       • oxycodone immediate release (ROXICODONE) tablet 10 mg  10 mg Q3HRS PRN Silvia Preston A.P.N.       • morphine (pf) 4 mg/ml injection 4 mg  4 mg Q3HRS PRN Silvia M Preston, A.P.N.       • tramadol (ULTRAM) 50 MG tablet 50 mg  50 mg Q4HRS PRN Silvia Preston A.P.N.       • midazolam (VERSED) 2 MG/2ML injection 0.5-2 mg  0.5-2 mg Q HOUR PRN Silvia Preston A.P.N.   2 mg at 12/24/17 0017   • sodium bicarbonate 8.4 % injection 50 mEq  50 mEq Q HOUR PRN Silvia Preston A.P.N.       • morphine (pf) 4 mg/ml injection 4 mg  4 mg Q HOUR PRN Silvia Preston A.P.N.   4 mg at 12/23/17 0239   • ondansetron (ZOFRAN) syringe/vial injection 4 mg  4 mg  Q6HRS PRN Silvia Preston, A.P.N.        Or   • prochlorperazine (COMPAZINE) injection 10 mg  10 mg Q6HRS PRN Silvia Preston, A.P.N.        Or   • promethazine (PHENERGAN) suppository 25 mg  25 mg Q6HRS PRN Silvia Preston, A.P.N.       • acetaminophen (TYLENOL) tablet 650 mg  650 mg Q4HRS PRN Silvia  Mohan, A.P.N.        Or   • acetaminophen (TYLENOL) suppository 650 mg  650 mg Q4HRS PRN Silvia  Mohan, A.P.N.       • mag hydrox-al hydrox-simeth (MAALOX PLUS ES or MYLANTA DS) suspension 30 mL  30 mL Q4HRS PRN Silvia Preston, A.P.N.       • diphenhydrAMINE (BENADRYL) tablet/capsule 25 mg  25 mg HS PRN - MR X 1 Silvia Preston A.P.N.       • epinephrine 1 mg/mL(1:1000) 8 mg in  mL Infusion  0-0.2 mcg/kg/min Continuous Kiel Ash M.D. 28.8 mL/hr at 12/24/17 0256 0.05 mcg/kg/min at 12/24/17 0256   • dexmedetomidine (PRECEDEX) 400 mcg/100mL premix infusion  0.1-1.5 mcg/kg/hr Continuous Silvia Preston A.P.N. 57.5 mL/hr at 12/24/17 0628 1.5 mcg/kg/hr at 12/24/17 0628   • propofol (DIPRIVAN) injection  0-80 mcg/kg/min Continuous Silvia Preston A.P.N. 23 mL/hr at 12/24/17 0510 25 mcg/kg/min at 12/24/17 0510   • Magnesium Sulfate in D5W IVPB premix 1 g  1 g DAILY Silvia Preston A.P.N.   Stopped at 12/23/17 0942   • albuterol inhaler 2 Puff  2 Puff Q4H PRN (RT) Kiel Ash M.D.       • carvedilol (COREG) tablet 6.25 mg  6.25 mg BID WITH MEALS Rayne Mcdowell, A.P.R.N.   Stopped at 12/22/17 1730   • lisinopril (PRINIVIL) tablet 5 mg  5 mg Q DAY KAMALA PriceP.R.N.   Stopped at 12/22/17 0900   • alprazolam (XANAX) tablet 0.25 mg  0.25 mg Q6HRS PRN SUZANNA Alfred       • rosuvastatin (CRESTOR) tablet 20 mg  20 mg Q EVENING KAMALA MendozaP.NAlex   Stopped at 12/22/17 2100   • ipratropium-albuterol (DUONEB) nebulizer solution 3 mL  3 mL Q4H PRN (RT) Kiel Ash M.D.   3 mL at 12/23/17 1154     Last reviewed on 12/22/2017  7:07 AM by Katie Aly RMAMADOU    Quality  Measures:    Labs reviewed, Medications reviewed and Radiology images reviewed                      Assessment/Plan:  -  Status post 2-vessel coronary artery bypass grafting on 12/22/2017.   - remains on IABP at 1:1   - continuing to titrate pressors (epi/jazz)   - chest tubes per CTS   - replace a-line   - albumin   -  Acute hypoxic respiratory failure secondary to above.   - intubated 12/22   - continue full vent support   - no SBT given instability   - wean fio2 only, keep peep at 10   - monitor volume status   - diurese once more stable   - f/u bal 12/23  -  Coronary artery disease with multivessel disease.   - s/p Cab  -  Cardiomyopathy with last reported ejection fraction of 35% and global   systolic dysfunction.  -  Severely dilated right ventricle.  -  Chronic obstructive pulmonary disease.  -  Reported diabetes.   - ssi and q4 fsbs  -  Dyslipidemia.  -  Clinically with obstructive sleep apnea.  -  Incomplete medical database.      Discussed patient condition and risk of morbidity and/or mortality with Family, RN, RT, Therapies, Pharmacy and [facility].    The patient remains critically ill.  Critical care time = 40 minutes in directly providing and coordinating critical care and extensive data review.  No time overlap and excludes procedures.

## 2017-12-24 NOTE — PROGRESS NOTES
Monitor Summary: 100% A-paced @ 80 with frequent non-perfusing PVCs and rare PACs. 0.24/0.08/0.4      12 hour chart check

## 2017-12-24 NOTE — PROGRESS NOTES
KAYLIE Garcia bedside for assessment.  Updated and aware of ART line no longer reading and of Dr Landa plan for replacement today.  Aware that IABP and BP Cuff correlating.  To titrate and monitor based on IABP and BP cuff

## 2017-12-24 NOTE — PROGRESS NOTES
Updated Radha Martinez on patient's urine output.    Discussion of clarification of balloon pump setting, as per note states 1:2 while functioning 1:1 setting.  Per Radha, okay to trial patient in 1:2, if stable okay to remain in 1:2, if not return patient to 1:1  Orders received to give 25g albumin IV followed by 500mL bolus NS.  Orders entered and implemented.

## 2017-12-24 NOTE — PROGRESS NOTES
Cardiovascular Surgery Progress Note    Name: Joshua Medrano  MRN: [id number]  : 1965  Admit Date: 2017 10:21 AM  Procedure:  Procedure(s) and Anesthesia Type:     * MULTIPLE CORONARY ARTERY BYPASS ENDO VEIN HARVEST X2 - General     * JIM - General  2 Day Post-Op    Vitals:  Patient Vitals for the past 8 hrs:   Temp Monitored Temp SpO2 O2 Delivery Pulse Heart Rate (Monitored) Resp NIBP Weight   17 0845 - 37.3 °C (99.1 °F) - - (!) 155 74 (!) 26 - -   17 0830 - 37.4 °C (99.3 °F) - - 85 75 (!) 26 - -   17 0815 - 37.4 °C (99.3 °F) - - (!) 135 80 (!) 26 107/66 -   17 0800 - 37.4 °C (99.3 °F) - - 90 70 (!)  - -   17 0745 - 37.4 °C (99.3 °F) - - 80 78 (!) 26 (!) 97/64 -   17 0730 - 37.4 °C (99.3 °F) - - 81 69 (!) 26 (!) 97/64 -   17 0715 - 37.5 °C (99.5 °F) - - 82 73 (!)  111/67 -   17 0700 - - - - 82 - - 118/57 -   17 0630 - - 100 % - - 80 - - -   17 0600 - 37.5 °C (99.5 °F) - - 80 68 (!)  113/72 -   17 0500 - 37.6 °C (99.7 °F) 100 % - (!) 114 86 (!)  122/65 -   17 0400 37.7 °C (99.9 °F) 37.7 °C (99.9 °F) 100 % Ventilator 80 80 (!) 26 111/65 (!) 153.4 kg (338 lb 3 oz)   17 0300 - 37.7 °C (99.9 °F) 100 % - (!) 221 80 (!)  101/54 -   17 0200 - 37.7 °C (99.9 °F) 100 % - 78 80 (!) 26 101/73 -   17 0100 - 37.8 °C (100 °F) 100 % - 85 82 (!) 26 119/70 -     Temp (24hrs), Av.9 °C (100.2 °F), Min:37.7 °C (99.9 °F), Max:38.1 °C (100.6 °F)      Respiratory:  Leo Vent Mode: APVCMV, Rate (breaths/min): 26, PEEP/CPAP: 10, FiO2: 60, P Peak (PIP): 28, P MEAN: 16 Respiration: (!) 26, Pulse Oximetry: 100 %  Chest Tube Group 1 (A) Mediastinal ANgled 32-Tube Status / Drainage: Sutured in Place;Patent;Draining;Small;Moderate;Sanguinous, Chest Tube Group 2 (B) Mediastinal Straight 32-Tube Status / Drainage: Sutured in Place;Patent;Draining;Small;Moderate;Sanguinous, Chest Tube Group 1 (A) Mediastinal ANgled 32-Device:  Closed Drainage System;Suction 20 cm Water, Chest Tube Group 2 (B) Mediastinal Straight 32-Device: Closed Drainage System;Suction 20 cm Water  Chest Tube Drains:  Francisco Javier Girard 1: 10 ml  Mediastinal Chest Tube 1: 0 ml    Fluids:    Intake/Output Summary (Last 24 hours) at 12/24/17 0856  Last data filed at 12/24/17 0800   Gross per 24 hour   Intake          3471.79 ml   Output             1732 ml   Net          1739.79 ml     Admit weight: Weight: (!) 159.2 kg (350 lb 15.6 oz)  Current weight: Weight: (!) 153.4 kg (338 lb 3 oz) (12/24/17 0400)    Labs:  Recent Labs      12/23/17   0400  12/23/17   1035  12/24/17   0420   WBC  30.7*  30.5*  32.4*   RBC  4.88  4.98  4.68*   HEMOGLOBIN  12.2*  11.9*  11.3*   HEMATOCRIT  38.6*  39.8*  36.8*   MCV  79.1*  79.9*  78.6*   MCH  25.0*  23.9*  24.1*   MCHC  31.6*  29.9*  30.7*   RDW  57.8*  59.6*  59.4*   PLATELETCT  370  377  246   MPV  11.1  11.1  10.3     Recent Labs      12/22/17   0347  12/23/17   1035   NEUTSPOLYS  78.30*  83.50*   LYMPHOCYTES  9.40*  2.60*   MONOCYTES  6.90  5.20   EOSINOPHILS  3.30  0.00   BASOPHILS  1.30  0.00   RBCMORPHOLO   --   Present     Recent Labs      12/23/17   0400  12/23/17   0500  12/23/17   1035  12/24/17   0420   SODIUM  139   --   138  140   POTASSIUM  4.1  4.1  4.1  3.8   CHLORIDE  108   --   108  111   CO2  22   --   22  20   GLUCOSE  108*   --   162*  147*   BUN  20   --   20  21   CREATININE  0.98   --   1.10  0.97   CALCIUM  7.9*   --   7.9*  8.2*     Recent Labs      12/22/17   0347  12/22/17   1400  12/23/17   1035   APTT  32.9  36.1*  34.5   INR  1.25*  1.60*  1.36*       Medications:  • NS       • insulin NPH  5 Units     • insulin lispro  4 Units     • famotidine  20 mg      Or   • famotidine  20 mg     • chlorhexidine  15 mL     • insulin lispro  0-15 Units     • docusate sodium  100 mg      And   • senna-docusate  1 Tab     • enoxaparin  40 mg     • mupirocin  1 Application     • aspirin EC  81 mg     • clopidogrel  75 mg      • magnesium sulfate  1 g Stopped (12/23/17 0942)   • carvedilol  6.25 mg     • lisinopril  5 mg     • rosuvastatin  20 mg         Exam:   Review of Systems   Unable to perform ROS: Intubated       Physical Exam   Constitutional: No distress. He is intubated.   Neck: Neck supple.   Cardiovascular: Normal rate and regular rhythm.  Exam reveals friction rub. Exam reveals no gallop.    No murmur heard.  Pulmonary/Chest: Effort normal. He is intubated. No respiratory distress. He has decreased breath sounds in the right lower field and the left lower field.   Abdominal: Soft.   Musculoskeletal: He exhibits edema.   Neurological:   sedated   Skin: Skin is warm.       Quality Measures:     Reviewed items::  EKG reviewed, Labs reviewed, Medications reviewed and Radiology images reviewed  Jenkins catheter::  One or Two Days Post Surgery (Day of Surgery being Day 0) and Critically Ill - Requiring Accurate Measurement of Urinary Output  Central line in place:  Need for access and Vasopressors  DVT prophylaxis pharmacological::  Contraindicated - High bleeding risk  DVT prophylaxis - mechanical:  SCDs  Ulcer Prophylaxis::  Yes      Assessment/Plan:  POD 1 - HOTN- IABP 1:2, epi/jazz, vent- peep inc this am, keep CT/jenkins, CPM  POD 2 HOTN - epi/jazz- IABP 1:2, Vent - pulm following, s/p PEA arrest yesterday- bronch with lots of thick secretions, sedated, CPM    Patient seen, examined and plan reviewed with midlevel provider. I agree with the plan.      Active Hospital Problems    Diagnosis   • CAD (coronary artery disease) [I25.10]   • CHF (congestive heart failure) (CMS-HCC) [I50.9]

## 2017-12-24 NOTE — PROGRESS NOTES
Monitor Summary: 100% A-paced 80 with frequent PAC's and rare PVCs. Patient's underlying rhythm is 1 degree AVB. .24/.08/.40

## 2017-12-24 NOTE — PROGRESS NOTES
Attempt to turn patient unsuccessful.  Patient became agitated pulling against restraints, causing significant changes in BP and safety concerns.  Leaving supine for now.  Family bedside, assisting with calming of patient.

## 2017-12-24 NOTE — PROCEDURES
Date: 12/24/2017    Procedure:  Arterial Catheter Insertion    Indication: hypotension, pressors, previous a-line no longer functioning    Physician:  Rodolfo Landa M.D.    Consent:  Emergent    Procedure:  The patient is intubated and on full mechanical vent support.  Arterial catheter is indicated for continuous invasive BP monitoring to titrate vasoactive agents.  The left wrist was prepped and draped using sterile barrier precautions.  A small bore radial artery catheter was placed in the left radial artery on the first attempt without difficulty.  A good quality arterial waveform was noted on monitor.  The catheter ws sutured in place and a sterile dressing was applied.  No immediate complications.  EBL < 5 cc.

## 2017-12-24 NOTE — CARE PLAN
Problem: Ventilation Defect:  Goal: Ability to achieve and maintain unassisted ventilation or tolerate decreased levels of ventilator support  Adult Ventilation Update    Total Vent Days: 3  26/600/10/60% 9.0@26  Patient Lines/Drains/Airways Status    Active Airway     Name: Placement date: Placement time: Site: Days:    Airway Group ET Tube Oral 9.0 12/22/17   0753   Oral   3              Plateau Pressure (Q Shift): 24 (12/23/17 1154)  Static Compliance (ml / cm H2O): 48 (12/23/17 1715)    Sputum/Suction   Cough: Productive (12/24/17 0400)  Sputum Amount: Small (12/24/17 0400)  Sputum Color: Tan (12/24/17 0400)  Sputum Consistency: Thick (12/24/17 0400)    Mobility Group  Activity Performed: Unable to mobilize (12/23/17 2000)  Time Activity Tolerated: 45 min (12/21/17 2030)  Distance Per Occurrence (ft.): 200 feet (12/21/17 2030)  # of Times Distance was Traveled: 2 (12/21/17 2030)  Assistance / Tolerance: No assistance required;Tolerates Well (12/21/17 2030)  Pt Calls for Assistance: No (12/23/17 2000)  Staff Present for Mobilization: RN (12/21/17 2030)  Gait: Steady (12/21/17 2030)  Assistive Devices: None (12/21/17 2030)  Reason Not Mobilized: Bed rest (12/23/17 2000)  Mobilization Comments: IABP, PEEP 10 (12/23/17 2000)    Events/Summary/Plan: no vent changes at this time (12/24/17 0308)

## 2017-12-24 NOTE — PROGRESS NOTES
Cortrak Placement    Tube Team verified patient name and medical record number prior to tube placement.  Cortrak tube (43 inches, 12 [place]) placed at 85 cm in right nare.  Per Cortrak picture, tube appears to be in the stomach.  Nursing Instructions: Awaiting KUB to confirm placement before use for medications or feeding. Once placement confirmed, flush tube with 30 ml of water, and then remove and save stylet, in patient medication drawer.

## 2017-12-24 NOTE — PROGRESS NOTES
Patient started to not have their own intrinsic ventricle beats take place and appearance of possible A fib/flutter.  EKG completed.  Mg and K labs sent per protocol.  Patient V wires connected to pacer.  Page sent to APN for updates and clarification of orders.

## 2017-12-25 ENCOUNTER — APPOINTMENT (OUTPATIENT)
Dept: RADIOLOGY | Facility: MEDICAL CENTER | Age: 52
DRG: 003 | End: 2017-12-25
Attending: INTERNAL MEDICINE
Payer: MEDICARE

## 2017-12-25 LAB
ACT BLD: 411 SEC (ref 74–137)
ACT BLD: 455 SEC (ref 74–137)
ACT BLD: 472 SEC (ref 74–137)
ACT BLD: 483 SEC (ref 74–137)
ACT BLD: 521 SEC (ref 74–137)
ACT BLD: 571 SEC (ref 74–137)
ACTION RANGE TRIGGERED IACRT: NO
ACTION RANGE TRIGGERED IACRT: YES
ANION GAP SERPL CALC-SCNC: 6 MMOL/L (ref 0–11.9)
BACTERIA SPEC RESP CULT: NORMAL
BASE EXCESS BLDA CALC-SCNC: -2 MMOL/L (ref -4–3)
BASE EXCESS BLDA CALC-SCNC: 1 MMOL/L (ref -4–3)
BASE EXCESS BLDA CALC-SCNC: 1 MMOL/L (ref -4–3)
BASE EXCESS BLDA CALC-SCNC: 5 MMOL/L (ref -4–3)
BODY TEMPERATURE: ABNORMAL DEGREES
BUN SERPL-MCNC: 21 MG/DL (ref 8–22)
CA-I BLD ISE-SCNC: 0.87 MMOL/L (ref 1.1–1.3)
CA-I BLD ISE-SCNC: 1.1 MMOL/L (ref 1.1–1.3)
CA-I BLD ISE-SCNC: 1.11 MMOL/L (ref 1.1–1.3)
CA-I BLD ISE-SCNC: 1.13 MMOL/L (ref 1.1–1.3)
CA-I BLD ISE-SCNC: 1.24 MMOL/L (ref 1.1–1.3)
CALCIUM SERPL-MCNC: 8 MG/DL (ref 8.5–10.5)
CHLORIDE SERPL-SCNC: 112 MMOL/L (ref 96–112)
CO2 BLDA-SCNC: 20 MMOL/L (ref 20–33)
CO2 BLDA-SCNC: 30 MMOL/L (ref 20–33)
CO2 BLDA-SCNC: 33 MMOL/L (ref 20–33)
CO2 BLDA-SCNC: 33 MMOL/L (ref 20–33)
CO2 SERPL-SCNC: 22 MMOL/L (ref 20–33)
CREAT SERPL-MCNC: 0.92 MG/DL (ref 0.5–1.4)
CRP SERPL HS-MCNC: 19.88 MG/DL (ref 0–0.75)
ERYTHROCYTE [DISTWIDTH] IN BLOOD BY AUTOMATED COUNT: 59.4 FL (ref 35.9–50)
GFR SERPL CREATININE-BSD FRML MDRD: >60 ML/MIN/1.73 M 2
GLUCOSE BLD-MCNC: 118 MG/DL (ref 65–99)
GLUCOSE BLD-MCNC: 124 MG/DL (ref 65–99)
GLUCOSE BLD-MCNC: 127 MG/DL (ref 65–99)
GLUCOSE BLD-MCNC: 131 MG/DL (ref 65–99)
GLUCOSE BLD-MCNC: 132 MG/DL (ref 65–99)
GLUCOSE BLD-MCNC: 142 MG/DL (ref 65–99)
GLUCOSE BLD-MCNC: 153 MG/DL (ref 65–99)
GLUCOSE BLD-MCNC: 173 MG/DL (ref 65–99)
GLUCOSE BLD-MCNC: 225 MG/DL (ref 65–99)
GLUCOSE SERPL-MCNC: 146 MG/DL (ref 65–99)
GRAM STN SPEC: NORMAL
HCO3 BLDA-SCNC: 19.7 MMOL/L (ref 17–25)
HCO3 BLDA-SCNC: 28.4 MMOL/L (ref 17–25)
HCO3 BLDA-SCNC: 30.8 MMOL/L (ref 17–25)
HCO3 BLDA-SCNC: 31 MMOL/L (ref 17–25)
HCT VFR BLD AUTO: 34.4 % (ref 42–52)
HCT VFR BLD CALC: 32 % (ref 42–52)
HCT VFR BLD CALC: 34 % (ref 42–52)
HCT VFR BLD CALC: 34 % (ref 42–52)
HCT VFR BLD CALC: 35 % (ref 42–52)
HCT VFR BLD CALC: 48 % (ref 42–52)
HGB BLD-MCNC: 10.4 G/DL (ref 14–18)
HGB BLD-MCNC: 10.9 G/DL (ref 14–18)
HGB BLD-MCNC: 11.6 G/DL (ref 14–18)
HGB BLD-MCNC: 11.6 G/DL (ref 14–18)
HGB BLD-MCNC: 11.9 G/DL (ref 14–18)
HGB BLD-MCNC: 16.3 G/DL (ref 14–18)
INST. QUALIFIED PATIENT IIQPT: YES
MCH RBC QN AUTO: 23.9 PG (ref 27–33)
MCHC RBC AUTO-ENTMCNC: 30.2 G/DL (ref 33.7–35.3)
MCV RBC AUTO: 78.9 FL (ref 81.4–97.8)
O2/TOTAL GAS SETTING VFR VENT: 100 %
O2/TOTAL GAS SETTING VFR VENT: 40 %
PCO2 BLDA: 25.9 MMHG (ref 26–37)
PCO2 BLDA: 51.1 MMHG (ref 26–37)
PCO2 BLDA: 60.7 MMHG (ref 26–37)
PCO2 BLDA: 69.1 MMHG (ref 26–37)
PCO2 TEMP ADJ BLDA: 26.5 MMHG (ref 26–37)
PCO2 TEMP ADJ BLDA: 51.1 MMHG (ref 26–37)
PCO2 TEMP ADJ BLDA: 60.7 MMHG (ref 26–37)
PCO2 TEMP ADJ BLDA: 69.1 MMHG (ref 26–37)
PH BLDA: 7.26 [PH] (ref 7.4–7.5)
PH BLDA: 7.28 [PH] (ref 7.4–7.5)
PH BLDA: 7.39 [PH] (ref 7.4–7.5)
PH BLDA: 7.49 [PH] (ref 7.4–7.5)
PH TEMP ADJ BLDA: 7.26 [PH] (ref 7.4–7.5)
PH TEMP ADJ BLDA: 7.28 [PH] (ref 7.4–7.5)
PH TEMP ADJ BLDA: 7.39 [PH] (ref 7.4–7.5)
PH TEMP ADJ BLDA: 7.48 [PH] (ref 7.4–7.5)
PLATELET # BLD AUTO: 194 K/UL (ref 164–446)
PMV BLD AUTO: 10.6 FL (ref 9–12.9)
PO2 BLDA: 174 MMHG (ref 64–87)
PO2 BLDA: 182 MMHG (ref 64–87)
PO2 BLDA: 350 MMHG (ref 64–87)
PO2 BLDA: 65 MMHG (ref 64–87)
PO2 TEMP ADJ BLDA: 174 MMHG (ref 64–87)
PO2 TEMP ADJ BLDA: 182 MMHG (ref 64–87)
PO2 TEMP ADJ BLDA: 350 MMHG (ref 64–87)
PO2 TEMP ADJ BLDA: 67 MMHG (ref 64–87)
POTASSIUM BLD-SCNC: 3.3 MMOL/L (ref 3.6–5.5)
POTASSIUM BLD-SCNC: 4 MMOL/L (ref 3.6–5.5)
POTASSIUM BLD-SCNC: 4.4 MMOL/L (ref 3.6–5.5)
POTASSIUM BLD-SCNC: 4.7 MMOL/L (ref 3.6–5.5)
POTASSIUM SERPL-SCNC: 3.3 MMOL/L (ref 3.6–5.5)
POTASSIUM SERPL-SCNC: 3.4 MMOL/L (ref 3.6–5.5)
POTASSIUM SERPL-SCNC: 3.6 MMOL/L (ref 3.6–5.5)
POTASSIUM SERPL-SCNC: 3.6 MMOL/L (ref 3.6–5.5)
PREALB SERPL-MCNC: 4 MG/DL (ref 18–38)
RBC # BLD AUTO: 4.36 M/UL (ref 4.7–6.1)
SAO2 % BLDA: 100 % (ref 93–99)
SAO2 % BLDA: 94 % (ref 93–99)
SAO2 % BLDA: 99 % (ref 93–99)
SAO2 % BLDA: 99 % (ref 93–99)
SIGNIFICANT IND 70042: NORMAL
SITE SITE: NORMAL
SODIUM BLD-SCNC: 137 MMOL/L (ref 135–145)
SODIUM BLD-SCNC: 137 MMOL/L (ref 135–145)
SODIUM BLD-SCNC: 140 MMOL/L (ref 135–145)
SODIUM BLD-SCNC: 144 MMOL/L (ref 135–145)
SODIUM SERPL-SCNC: 140 MMOL/L (ref 135–145)
SOURCE SOURCE: NORMAL
SPECIMEN DRAWN FROM PATIENT: ABNORMAL
WBC # BLD AUTO: 25.7 K/UL (ref 4.8–10.8)

## 2017-12-25 PROCEDURE — 84295 ASSAY OF SERUM SODIUM: CPT

## 2017-12-25 PROCEDURE — 85014 HEMATOCRIT: CPT

## 2017-12-25 PROCEDURE — 94003 VENT MGMT INPAT SUBQ DAY: CPT

## 2017-12-25 PROCEDURE — 700111 HCHG RX REV CODE 636 W/ 250 OVERRIDE (IP): Performed by: NURSE PRACTITIONER

## 2017-12-25 PROCEDURE — 84132 ASSAY OF SERUM POTASSIUM: CPT | Mod: 91

## 2017-12-25 PROCEDURE — 700111 HCHG RX REV CODE 636 W/ 250 OVERRIDE (IP): Performed by: INTERNAL MEDICINE

## 2017-12-25 PROCEDURE — 86140 C-REACTIVE PROTEIN: CPT

## 2017-12-25 PROCEDURE — 82962 GLUCOSE BLOOD TEST: CPT | Mod: 91

## 2017-12-25 PROCEDURE — 82330 ASSAY OF CALCIUM: CPT

## 2017-12-25 PROCEDURE — 80048 BASIC METABOLIC PNL TOTAL CA: CPT

## 2017-12-25 PROCEDURE — 700105 HCHG RX REV CODE 258: Performed by: CLINICAL NURSE SPECIALIST

## 2017-12-25 PROCEDURE — 700101 HCHG RX REV CODE 250: Performed by: CLINICAL NURSE SPECIALIST

## 2017-12-25 PROCEDURE — 84134 ASSAY OF PREALBUMIN: CPT

## 2017-12-25 PROCEDURE — A9270 NON-COVERED ITEM OR SERVICE: HCPCS | Performed by: INTERNAL MEDICINE

## 2017-12-25 PROCEDURE — 700111 HCHG RX REV CODE 636 W/ 250 OVERRIDE (IP): Performed by: CLINICAL NURSE SPECIALIST

## 2017-12-25 PROCEDURE — 700102 HCHG RX REV CODE 250 W/ 637 OVERRIDE(OP): Performed by: INTERNAL MEDICINE

## 2017-12-25 PROCEDURE — 82803 BLOOD GASES ANY COMBINATION: CPT

## 2017-12-25 PROCEDURE — 85027 COMPLETE CBC AUTOMATED: CPT

## 2017-12-25 PROCEDURE — A9270 NON-COVERED ITEM OR SERVICE: HCPCS | Performed by: CLINICAL NURSE SPECIALIST

## 2017-12-25 PROCEDURE — 700102 HCHG RX REV CODE 250 W/ 637 OVERRIDE(OP): Performed by: CLINICAL NURSE SPECIALIST

## 2017-12-25 PROCEDURE — 770022 HCHG ROOM/CARE - ICU (200)

## 2017-12-25 PROCEDURE — 700111 HCHG RX REV CODE 636 W/ 250 OVERRIDE (IP): Performed by: THORACIC SURGERY (CARDIOTHORACIC VASCULAR SURGERY)

## 2017-12-25 PROCEDURE — 700105 HCHG RX REV CODE 258: Performed by: NURSE PRACTITIONER

## 2017-12-25 PROCEDURE — 700105 HCHG RX REV CODE 258

## 2017-12-25 PROCEDURE — 700105 HCHG RX REV CODE 258: Performed by: THORACIC SURGERY (CARDIOTHORACIC VASCULAR SURGERY)

## 2017-12-25 PROCEDURE — 71010 DX-CHEST-PORTABLE (1 VIEW): CPT

## 2017-12-25 RX ORDER — POTASSIUM CHLORIDE 14.9 MG/ML
20 INJECTION INTRAVENOUS ONCE
Status: COMPLETED | OUTPATIENT
Start: 2017-12-25 | End: 2017-12-25

## 2017-12-25 RX ORDER — SODIUM CHLORIDE 9 MG/ML
INJECTION, SOLUTION INTRAVENOUS
Status: COMPLETED
Start: 2017-12-25 | End: 2017-12-25

## 2017-12-25 RX ADMIN — DEXMEDETOMIDINE HYDROCHLORIDE 1.5 MCG/KG/HR: 4 INJECTION, SOLUTION INTRAVENOUS at 13:18

## 2017-12-25 RX ADMIN — PROPOFOL 25 MCG/KG/MIN: 10 INJECTION, EMULSION INTRAVENOUS at 03:30

## 2017-12-25 RX ADMIN — POTASSIUM CHLORIDE 20 MEQ: 200 INJECTION, SOLUTION INTRAVENOUS at 12:36

## 2017-12-25 RX ADMIN — CHLORHEXIDINE GLUCONATE 15 ML: 1.2 RINSE ORAL at 20:45

## 2017-12-25 RX ADMIN — MUPIROCIN 1 APPLICATION: 20 OINTMENT TOPICAL at 08:18

## 2017-12-25 RX ADMIN — DEXMEDETOMIDINE HYDROCHLORIDE 1 MCG/KG/HR: 4 INJECTION, SOLUTION INTRAVENOUS at 17:48

## 2017-12-25 RX ADMIN — FAMOTIDINE 20 MG: 20 TABLET, FILM COATED ORAL at 07:46

## 2017-12-25 RX ADMIN — SODIUM CHLORIDE: 9 INJECTION, SOLUTION INTRAVENOUS at 04:20

## 2017-12-25 RX ADMIN — PHENYLEPHRINE HYDROCHLORIDE 30 MCG/MIN: 10 INJECTION INTRAVENOUS at 09:30

## 2017-12-25 RX ADMIN — PROPOFOL 25 MCG/KG/MIN: 10 INJECTION, EMULSION INTRAVENOUS at 13:18

## 2017-12-25 RX ADMIN — DEXMEDETOMIDINE HYDROCHLORIDE 1.5 MCG/KG/HR: 4 INJECTION, SOLUTION INTRAVENOUS at 09:41

## 2017-12-25 RX ADMIN — CLOPIDOGREL 75 MG: 75 TABLET, FILM COATED ORAL at 07:46

## 2017-12-25 RX ADMIN — DEXMEDETOMIDINE HYDROCHLORIDE 1.5 MCG/KG/HR: 4 INJECTION, SOLUTION INTRAVENOUS at 11:25

## 2017-12-25 RX ADMIN — FAMOTIDINE 20 MG: 10 INJECTION, SOLUTION INTRAVENOUS at 20:45

## 2017-12-25 RX ADMIN — ROSUVASTATIN CALCIUM 20 MG: 20 TABLET, FILM COATED ORAL at 20:44

## 2017-12-25 RX ADMIN — DEXMEDETOMIDINE HYDROCHLORIDE 1 MCG/KG/HR: 4 INJECTION, SOLUTION INTRAVENOUS at 22:50

## 2017-12-25 RX ADMIN — DEXMEDETOMIDINE HYDROCHLORIDE 1 MCG/KG/HR: 4 INJECTION, SOLUTION INTRAVENOUS at 20:20

## 2017-12-25 RX ADMIN — STANDARDIZED SENNA CONCENTRATE AND DOCUSATE SODIUM 1 TABLET: 8.6; 5 TABLET, FILM COATED ORAL at 20:45

## 2017-12-25 RX ADMIN — EPINEPHRINE 0.03 MCG/KG/MIN: 1 INJECTION, SOLUTION INTRAMUSCULAR; SUBCUTANEOUS at 07:18

## 2017-12-25 RX ADMIN — INSULIN HUMAN 5 UNITS: 100 INJECTION, SUSPENSION SUBCUTANEOUS at 06:24

## 2017-12-25 RX ADMIN — CHLORHEXIDINE GLUCONATE 15 ML: 1.2 RINSE ORAL at 07:46

## 2017-12-25 RX ADMIN — AMIODARONE HYDROCHLORIDE 1 MG/MIN: 50 INJECTION, SOLUTION INTRAVENOUS at 16:50

## 2017-12-25 RX ADMIN — MUPIROCIN 1 APPLICATION: 20 OINTMENT TOPICAL at 20:45

## 2017-12-25 RX ADMIN — POTASSIUM CHLORIDE 20 MEQ: 200 INJECTION, SOLUTION INTRAVENOUS at 18:41

## 2017-12-25 RX ADMIN — POTASSIUM CHLORIDE 20 MEQ: 2 INJECTION, SOLUTION, CONCENTRATE INTRAVENOUS at 09:29

## 2017-12-25 RX ADMIN — SODIUM CHLORIDE 500 ML: 9 INJECTION, SOLUTION INTRAVENOUS at 09:37

## 2017-12-25 RX ADMIN — DEXMEDETOMIDINE HYDROCHLORIDE 1.5 MCG/KG/HR: 4 INJECTION, SOLUTION INTRAVENOUS at 03:30

## 2017-12-25 RX ADMIN — FUROSEMIDE 5 MG/HR: 10 INJECTION, SOLUTION INTRAMUSCULAR; INTRAVENOUS at 09:42

## 2017-12-25 RX ADMIN — DEXMEDETOMIDINE HYDROCHLORIDE 1.5 MCG/KG/HR: 4 INJECTION, SOLUTION INTRAVENOUS at 05:59

## 2017-12-25 RX ADMIN — INSULIN HUMAN 5 UNITS: 100 INJECTION, SUSPENSION SUBCUTANEOUS at 20:49

## 2017-12-25 RX ADMIN — INSULIN HUMAN 5 UNITS: 100 INJECTION, SUSPENSION SUBCUTANEOUS at 14:31

## 2017-12-25 RX ADMIN — ASPIRIN 81 MG: 81 TABLET, COATED ORAL at 07:46

## 2017-12-25 RX ADMIN — PROPOFOL 25 MCG/KG/MIN: 10 INJECTION, EMULSION INTRAVENOUS at 07:45

## 2017-12-25 RX ADMIN — AMIODARONE HYDROCHLORIDE 150 MG: 1.5 INJECTION, SOLUTION INTRAVENOUS at 12:44

## 2017-12-25 RX ADMIN — DEXMEDETOMIDINE HYDROCHLORIDE 1.5 MCG/KG/HR: 4 INJECTION, SOLUTION INTRAVENOUS at 07:45

## 2017-12-25 RX ADMIN — ACETAMINOPHEN 650 MG: 325 TABLET, FILM COATED ORAL at 19:10

## 2017-12-25 RX ADMIN — Medication 50 MCG/HR: at 05:15

## 2017-12-25 RX ADMIN — PROPOFOL 20 MCG/KG/MIN: 10 INJECTION, EMULSION INTRAVENOUS at 22:00

## 2017-12-25 RX ADMIN — POTASSIUM CHLORIDE 20 MEQ: 14.9 INJECTION, SOLUTION INTRAVENOUS at 05:57

## 2017-12-25 RX ADMIN — DEXMEDETOMIDINE HYDROCHLORIDE 1 MCG/KG/HR: 4 INJECTION, SOLUTION INTRAVENOUS at 15:16

## 2017-12-25 RX ADMIN — PROPOFOL 25 MCG/KG/MIN: 10 INJECTION, EMULSION INTRAVENOUS at 17:03

## 2017-12-25 RX ADMIN — DEXMEDETOMIDINE HYDROCHLORIDE 1.5 MCG/KG/HR: 4 INJECTION, SOLUTION INTRAVENOUS at 00:35

## 2017-12-25 RX ADMIN — AMIODARONE HYDROCHLORIDE 150 MG: 1.5 INJECTION, SOLUTION INTRAVENOUS at 16:30

## 2017-12-25 ASSESSMENT — LIFESTYLE VARIABLES: REASON UNABLE TO ASSESS: INTUBATED

## 2017-12-25 NOTE — CARE PLAN
Problem: Post op day 2 CABG/Heart Valve Replacement  Goal: Optimal care of the post op CABG/heart valve replacement post op day 2    Intervention: Daily Weights  Done in AM  Intervention: Up in chair for all meals  Intubated and sedated  Intervention: Ambulate, increasing the distance each time x 3 and before bed  Intubated and sedated   Intervention: Stand at sink and wash up with assistance.  Clean incisions twice daily with soap and water.  Intubated and sedated with Previna in place  Intervention: IS q 1 hour while awake and record best IS volume  Not applicable  Intervention: Consider pacer wire removal by MD  Still 100% paced  Intervention: Consider removal of jenkins and chest tube if not already done  Still medically necessary

## 2017-12-25 NOTE — PROGRESS NOTES
Unable to obtain patient's possible A Fib/A Flutter on EKG.  Dr Landa bedside to observe NK in addition to NK printed sheets.    Recommendation of Camille, Page to Radha Martinez for updates and orders.

## 2017-12-25 NOTE — PROGRESS NOTES
Patient jenkins troubleshooted by this RN.  When attempting to flush, urine noted to flow around jenkins and into bed.  Jenkins exchanged using sterile technique.  Large blood clot noted to move thru tubing.  1400mL urine dumped at this time.

## 2017-12-25 NOTE — CARE PLAN
Problem: Post op day 2 CABG/Heart Valve Replacement  Goal: Optimal care of the post op CABG/heart valve replacement post op day 2    Intervention: Up in chair for all meals  Unable to ambulate, vented, IABP 1:1, unstable condition  Intervention: Ambulate, increasing the distance each time x 3 and before bed  Unable to ambulate.   Intervention: IS q 1 hour while awake and record best IS volume  N/a vented  Intervention: Consider pacer wire removal by MD  Wires connected to pacer box.  Not to be dc'd  Intervention: Consider removal of jenkins and chest tube if not already done  Both jenkins and chest tube to remain in.

## 2017-12-25 NOTE — PROGRESS NOTES
Bedside report received from MIN Guerrero.  All drips and lines verified.  Pt currently with IABP 2:1.  Radha updated on pt status and overnight events.  Will continue to monitor 1:1.

## 2017-12-25 NOTE — PROGRESS NOTES
Cardiovascular Surgery Progress Note    Name: Joshua Medrano  MRN: [id number]  : 1965  Admit Date: 2017 10:21 AM  Procedure:  Procedure(s) and Anesthesia Type:     * MULTIPLE CORONARY ARTERY BYPASS ENDO VEIN HARVEST X2 - General     * JIM - General  3 Day Post-Op    Vitals:  Patient Vitals for the past 8 hrs:   Temp SpO2 O2 Delivery Pulse Heart Rate (Monitored) Resp NIBP Weight   17 0600 - 99 % - - 69 (!) 26 (!) 89/57 -   17 0530 - 100 % - - 68 (!) 26 (!) 91/58 -   17 0500 - 100 % - - 68 (!) 26 (!) 96/57 -   17 0430 - 100 % - - 69 (!) 26 (!) 97/59 -   17 0400 37.8 °C (100 °F) 99 % Ventilator - 68 (!) 26 (!) 99/50 (!) 154 kg (339 lb 8.1 oz)   17 0330 - 99 % - 92 67 (!) 26 (!) 94/42 -   17 0300 - 100 % - - 71 (!) 26 (!) 91/46 -   17 0232 - 100 % - - 66 - - -   17 0230 - 100 % - 82 68 (!) 26 102/55 -   17 0200 - 99 % - - 68 (!) 26 (!) 97/59 -   17 0130 - 99 % - 75 68 (!) 26 101/48 -   17 0100 - 97 % - - 77 (!) 26 110/74 -   17 0030 - 97 % - - 75 (!) 26 106/66 -   17 0000 37.2 °C (99 °F) 100 % Ventilator - 66 (!) 26 (!) 90/60 (!) 153.4 kg (338 lb 3 oz)     Temp (24hrs), Av.4 °C (99.4 °F), Min:37.2 °C (99 °F), Max:37.8 °C (100 °F)      Respiratory:  Leo Vent Mode: APVCMV, Rate (breaths/min): 26, PEEP/CPAP: 10, FiO2: 40, P Peak (PIP): 27, P MEAN: 16 Respiration: (!) 26, Pulse Oximetry: 99 %  Chest Tube Group 1 (A) Mediastinal ANgled 32-Tube Status / Drainage: Patent;Sutured in Place;Draining;Scant;Sanguinous, Chest Tube Group 2 (B) Mediastinal Straight 32-Tube Status / Drainage: Sutured in Place;Patent;Draining;Sanguinous;Scant, Chest Tube Group 1 (A) Mediastinal ANgled 32-Device: Closed Drainage System;Suction 20 cm Water, Chest Tube Group 2 (B) Mediastinal Straight 32-Device: Closed Drainage System;Suction 20 cm Water  Chest Tube Drains:  Francisco Javier Girard 1: 0 ml  Mediastinal Chest Tube 1: 30  ml    Fluids:    Intake/Output Summary (Last 24 hours) at 12/25/17 0735  Last data filed at 12/25/17 0600   Gross per 24 hour   Intake          4857.04 ml   Output             4645 ml   Net           212.04 ml     Admit weight: Weight: (!) 159.2 kg (350 lb 15.6 oz)  Current weight: Weight: (!) 154 kg (339 lb 8.1 oz) (12/25/17 0400)    Labs:  Recent Labs      12/23/17   1035  12/24/17   0420  12/25/17   0450   WBC  30.5*  32.4*  25.7*   RBC  4.98  4.68*  4.36*   HEMOGLOBIN  11.9*  11.3*  10.4*   HEMATOCRIT  39.8*  36.8*  34.4*   MCV  79.9*  78.6*  78.9*   MCH  23.9*  24.1*  23.9*   MCHC  29.9*  30.7*  30.2*   RDW  59.6*  59.4*  59.4*   PLATELETCT  377  246  194   MPV  11.1  10.3  10.6     Recent Labs      12/23/17   1035   NEUTSPOLYS  83.50*   LYMPHOCYTES  2.60*   MONOCYTES  5.20   EOSINOPHILS  0.00   BASOPHILS  0.00   RBCMORPHOLO  Present     Recent Labs      12/23/17   1035  12/24/17   0420  12/24/17   1523  12/25/17   0450   SODIUM  138  140   --   140   POTASSIUM  4.1  3.8  3.3*  3.3*   CHLORIDE  108  111   --   112   CO2  22  20   --   22   GLUCOSE  162*  147*   --   146*   BUN  20  21   --   21   CREATININE  1.10  0.97   --   0.92   CALCIUM  7.9*  8.2*   --   8.0*     Recent Labs      12/22/17   1400  12/23/17   1035   APTT  36.1*  34.5   INR  1.60*  1.36*       Medications:  • potassium chloride (KCL-CENTRAL) IV *Administer in ICU only*  20 mEq 20 mEq (12/25/17 0557)   • K+ Scale: Goal of 4.5  1 Each     • insulin NPH  5 Units     • insulin lispro  4 Units     • famotidine  20 mg      Or   • famotidine  20 mg     • chlorhexidine  15 mL     • insulin lispro  0-15 Units     • docusate sodium  100 mg      And   • senna-docusate  1 Tab     • enoxaparin  40 mg     • mupirocin  1 Application     • aspirin EC  81 mg     • clopidogrel  75 mg     • carvedilol  6.25 mg     • lisinopril  5 mg     • rosuvastatin  20 mg         Exam:   Review of Systems   Unable to perform ROS: Intubated       Physical Exam    Constitutional: No distress. He is intubated.   Neck: Neck supple.   Cardiovascular: Normal rate and regular rhythm.  Exam reveals friction rub. Exam reveals no gallop.    No murmur heard.  Pulmonary/Chest: Effort normal. He is intubated. No respiratory distress. He has decreased breath sounds in the right lower field and the left lower field.   Abdominal: Soft.   Musculoskeletal: He exhibits edema.   Neurological:   sedated   Skin: Skin is warm.       Quality Measures:     Reviewed items::  EKG reviewed, Labs reviewed, Medications reviewed and Radiology images reviewed  Jenkins catheter::  One or Two Days Post Surgery (Day of Surgery being Day 0) and Critically Ill - Requiring Accurate Measurement of Urinary Output  Central line in place:  Need for access and Vasopressors  DVT prophylaxis pharmacological::  Contraindicated - High bleeding risk  DVT prophylaxis - mechanical:  SCDs  Ulcer Prophylaxis::  Yes      Assessment/Plan:  POD 1 - HOTN- IABP 1:2, epi/jazz, vent- peep inc this am, keep CT/jenkins, CPM  POD 2 HOTN - epi/jazz- IABP 1:2, Vent - pulm following, s/p PEA arrest yesterday- bronch with lots of thick secretions, sedated, CPM  POD 3 - HOTN - epi/jazz- decreased from yesterday, IABP 1:2, Vent - CMV 26, PEEP 10, sedated, diurese well yesterday- start lasix gtt today, CPM    Patient seen, examined and plan reviewed with midlevel provider. I agree with the plan.      Active Hospital Problems    Diagnosis   • CAD (coronary artery disease) [I25.10]   • CHF (congestive heart failure) (CMS-HCC) [I50.9]

## 2017-12-25 NOTE — PROGRESS NOTES
Pulmonary Critical Care Progress Note      DOS:  12/25/2017    Chief Complaint: CAD, 2V CABG    History of Present Illness: 52-year-old male with known past   medical history of diabetes, COPD, dyslipidemia, coronary artery disease, and   likely obstructive sleep apnea.  The patient was recently admitted here from   November 19 through the 24th with cough and dyspnea, at which point in time he   had a workup and identified of having underlying coronary artery disease with   a 100% proximal RCA with good collateral filling, 80 90% mid circ, 40%   proximal LAD.  He was also identified of having ejection fraction of 35% at   that time.  During his course, the patient was diuresed 14 liters with   significant improvement in his symptoms and subsequently set up for elective   2-vessel CABG.  Patient is currently status post LIMA to LAD and reverse   saphenous to major obtuse marginal branch.  Patient returns to the intensive   care unit on full mechanical ventilatory support, recovering from anesthesia   as well as having balloon pump in place as there was some difficulty getting   him off pump perioperatively.  Patient is currently hemodynamically intact on   0.1 mcg per kilogram per minute of epinephrine, insulin at 3 units per hour.           Interval Events:  24 hour interval history reviewed    - s/p CABG x 2   - more calm, dex 1.5, propofol low dose propofol   - IABP 2:1, paced, PVCs   - epi, jazz   - lasix gtt started today   - Tm 37.8   - williams TF   - 4.5 L UOP   - vent day 4, 26/10, 40%   - no wean   - Chest film improving bilat infs/edema   - starting K scale  Yesterday  Tm 100.6  +1.7L over last 24hr, -2.2L since admit  Pending cxr  Wbc 32 (30)  Hb 11  Na 140  K 3.8  Cr 0.97  Abg 7.44/29/61/93 on 60%  precedex 1.5  Epinephrine 0.05mcg/kg/min  neosynephrine 20mcg/min  Propofol 25  A Line no longer functioning  Remains on IABP 1:1      Review of Systems   Unable to perform ROS: Acuity of condition       Physical  Exam   Constitutional: He appears well-developed and well-nourished.   HENT:   Head: Normocephalic.   Eyes: Pupils are equal, round, and reactive to light. No scleral icterus.   Neck: No tracheal deviation present.   Cardiovascular: Normal rate.    Pulmonary/Chest:   Diminished throughout   Abdominal: Soft. He exhibits no distension.   Musculoskeletal: He exhibits edema.   Neurological: No cranial nerve deficit.   Skin: Skin is warm and dry.   Psychiatric:   sedate   Nursing note and vitals reviewed.        PFSH:  No change.    Respiratory:  Leo Vent Mode: APVCMV, Rate (breaths/min): 26, Vt Target (mL): 600, PEEP/CPAP: 10, FiO2: 40, Static Compliance (ml / cm H2O): 55, Control VTE (exp VT): 595  Pulse Oximetry: 100 %  Chest Tube Drains:  Francisco Javier Girard 1: 0 ml  Mediastinal Chest Tube 1: 30 ml      Recent Labs      12/23/17   0928  12/23/17   1206  12/23/17   2103  12/25/17   0041   ISTATAPH  7.436  7.446  7.446  7.488   ISTATAPCO2  32.5  34.8  29.2  25.9*   ISTATAPO2  50*  128*  61*  65   ISTATATCO2  23  25  21  20   [id number]  87*  99  93  94   ISTATARTHCO3  21.9  24.0  20.1  19.7   ISTATARTBE  -2  0  -3  -2   ISTATTEMP  38.0 C  38.1 C  37.9 C  37.5 C   ISTATFIO2  80   --   60  40   ISTATSPEC  Arterial  Arterial  Arterial  Arterial   ISTATAPHTC  7.421  7.430  7.433  7.481   [id number]  53*  135*  65  67       HemoDynamics:  Pulse: 72, Heart Rate (Monitored): 68  Arterial BP: 106/54, NIBP: (!) 91/57  CVP (mm Hg): (!) 12 MM HG            Neuro:  GCS Total [place] Coma Score: 7         Fluids:  Intake/Output       12/23/17 0700 - 12/24/17 0659 12/24/17 0700 - 12/25/17 0659 12/25/17 0700 - 12/26/17 0659      3128-7096 8652-3741 Total 0700-1859 1900-0659 Total 0700-1859 1900-0659 Total       Intake    I.V.  1951.8  1665.6 3617.4  2896.1  1718.7 4614.8  526.4  -- 526.4    Precedex Volume 656.5 690 1346.5  115 -- 115    Phenylephrine Volume 22.4 94.5 116.9 87.2 97.2 184.4 18.8 -- 18.8    Propofol Volume  245.7 279.3 525 276 276 552 46 -- 46    Insulin Volume 24 -- 24 -- -- -- -- -- --    Epinephrine Volume 538.5 415.8 954.3 275.9 218.5 494.5 34.6 -- 34.6    IV Piggyback Volume 100 -- 100 1225 75 1300 250 -- 250    NS  180 540 330 350 680 60 -- 60    IV Volume (Fentanyl ) 4.8 6 10.8 12 12 24 2 -- 2    Enteral  --  -- --  --  390 390  50  -- 50    Enteral Volume -- -- -- -- 300 300 50 -- 50    Free Water / Tube Flush -- -- -- -- 90 90 -- -- --    Total Intake 1951.8 1665.6 3617.4 2896.1 2108.7 5004.8 576.4 -- 576.4       Output    Urine  965  695 1660  155  4400 4555  120  -- 120    Indwelling Cathether  155 4400 4555 120 -- 120    Drains  130  52 182  70  80 150  --  -- --    Mediastinal Chest Tube 1 120 50 170 60 80 140 -- -- --    Francisco Javier Girard 1 10 2 12 10 0 10 -- -- --    Total Output 7023 349 6247 225 4480 4705 120 -- 120       Net I/O     856.8 918.6 1775.4 2671.1 -2371.3 299.8 456.4 -- 456.4        Weight: (!) 154 kg (339 lb 8.1 oz)  Recent Labs      12/22/17   1400   12/23/17   1035  12/24/17   0420  12/24/17   1523  12/25/17   0450  12/25/17   0813   SODIUM   --    < >  138  140   --   140   --    POTASSIUM  5.1   < >  4.1  3.8  3.3*  3.3*  3.6   CHLORIDE   --    < >  108  111   --   112   --    CO2   --    < >  22  20   --   22   --    BUN   --    < >  20  21   --   21   --    CREATININE   --    < >  1.10  0.97   --   0.92   --    MAGNESIUM  2.1   --   2.5   --   2.1   --    --    CALCIUM   --    < >  7.9*  8.2*   --   8.0*   --     < > = values in this interval not displayed.       GI/Nutrition:  Liver Function  Recent Labs      12/23/17   1035  12/24/17   0420  12/25/17   0450   ALTSGPT  10   --    --    ASTSGOT  38   --    --    ALKPHOSPHAT  90   --    --    TBILIRUBIN  0.9   --    --    PREALBUMIN   --    --   4.0*   GLUCOSE  162*  147*  146*       Heme:  Recent Labs      12/22/17   1400   12/23/17   1035  12/24/17   0420 12/25/17   0450   RBC   --    < >  4.98  4.68*  4.36*    HEMOGLOBIN   --    < >  11.9*  11.3*  10.4*   HEMATOCRIT   --    < >  39.8*  36.8*  34.4*   PLATELETCT  316   < >  377  246  194   PROTHROMBTM  18.7*   --   16.5*   --    --    APTT  36.1*   --   34.5   --    --    INR  1.60*   --   1.36*   --    --     < > = values in this interval not displayed.       Infectious Disease:  Monitored Temp  Av.1 °C (98.8 °F)  Min: 37 °C (98.6 °F)  Max: 37.2 °C (99 °F)  Temp  Av.6 °C (99.6 °F)  Min: 37.2 °C (99 °F)  Max: 37.9 °C (100.2 °F)  Micro: cultures reviewed  Recent Labs      17   1035  17   0420  17   0450   WBC  30.5*  32.4*  25.7*   NEUTSPOLYS  83.50*   --    --    LYMPHOCYTES  2.60*   --    --    MONOCYTES  5.20   --    --    EOSINOPHILS  0.00   --    --    BASOPHILS  0.00   --    --    ASTSGOT  38   --    --    ALTSGPT  10   --    --    ALKPHOSPHAT  90   --    --    TBILIRUBIN  0.9   --    --      Current Facility-Administered Medications   Medication Dose Frequency Provider Last Rate Last Dose   • furosemide (LASIX) 100 mg in  mL infusion  5 mg/hr Continuous Radha Martinez, A.P.N. 5 mL/hr at 17 0942 5 mg/hr at 17 0942   • K+ Scale: Goal of 4.5  1 Each Q6HRS Radha Martinez, A.P.N.   1 Each at 17 0800   • potassium chloride 20 mEq in D5W 250 mL IVPB  20 mEq Once Radha Martinez, A.P.N. 125 mL/hr at 17 0929 20 mEq at 17 0929   • insulin NPH (HUMULIN,NOVOLIN) injection 5 Units  5 Units Q8HRS Silvia Preston, A.P.N.   5 Units at 17 0624   • insulin lispro (HUMALOG) injection 4 Units  4 Units TID AC Silvia Preston, A.P.N.   Stopped at 17 1700   • phenylephrine (KRISTIN-SYNEPHRINE) 80,000 mcg in  mL Infusion  0-50 mcg/min Continuous Radha Martinez, A.P.N. 9.4 mL/hr at 17 0136 25 mcg/min at 17 0136   • famotidine (PEPCID) tablet 20 mg  20 mg BID Rodolfo Landa M.D.   20 mg at 17 0746    Or   • famotidine (PEPCID) injection 20 mg  20 mg BID Rodolfo Landa M.D.   20 mg at  12/24/17 2112   • chlorhexidine (PERIDEX) 0.12 % solution 15 mL  15 mL BID Rodolfo Landa M.D.   15 mL at 12/25/17 0746   • fentaNYL (SUBLIMAZE) 50 mcg/mL in 50mL (Continuous Infusion)   Continuous Rodolfo Landa M.D. 1 mL/hr at 12/25/17 0515 50 mcg/hr at 12/25/17 0515   • insulin lispro (HUMALOG) injection 0-15 Units  0-15 Units Q6HRS SUSI Alfred.P.N.   Stopped at 12/25/17 0600   • Respiratory Care per Protocol   Continuous RT Silvia Preston A.P.N.       • NS infusion   Continuous SUSI Mendoza.P.N. 10 mL/hr at 12/25/17 0420     • Pharmacy Consult Request ...Pain Management Review 1 Each  1 Each PRN Silvia Preston A.P.N.       • docusate sodium (COLACE) capsule 100 mg  100 mg QAM Silvia Preston A.P.N.   Stopped at 12/22/17 1345    And   • senna-docusate (PERICOLACE or SENOKOT S) 8.6-50 MG per tablet 1 Tab  1 Tab Nightly SUSI Mendoza.P.N.   1 Tab at 12/24/17 2112    And   • senna-docusate (PERICOLACE or SENOKOT S) 8.6-50 MG per tablet 1 Tab  1 Tab Q24HRS PRN Silvia Preston A.P.N.        And   • lactulose 20 GM/30ML solution 30 mL  30 mL Q24HRS PRN Silvia Preston A.P.N.        And   • bisacodyl (DULCOLAX) suppository 10 mg  10 mg Q24HRS PRN Silvia Preston A.P.N.        And   • fleet enema 133 mL  1 Each Once PRN Silvia Preston A.P.N.       • enoxaparin (LOVENOX) inj 40 mg  40 mg DAILY Silvia Preston A.P.N.   Stopped at 12/23/17 2100   • mupirocin (BACTROBAN) 2 % ointment 1 Application  1 Application BID Silvia M Preston, A.P.N.   1 Application at 12/25/17 0818   • aspirin EC (ECOTRIN) tablet 81 mg  81 mg DAILY Silvia Preston, A.P.N.   81 mg at 12/25/17 0746   • clopidogrel (PLAVIX) tablet 75 mg  75 mg DAILY Silvia Preston, A.P.N.   75 mg at 12/25/17 0746   • electrolyte-A (PLASMALYTE-A) infusion   PRN Silvia Preston A.P.N.       • oxycodone immediate release (ROXICODONE) tablet 5 mg  5 mg Q3HRS PRN Silvia Preston, A.P.N.       • oxycodone immediate release (ROXICODONE) tablet 10 mg  10 mg  Q3HRS PRN Silvia  Mohan, A.P.N.       • morphine (pf) 4 mg/ml injection 4 mg  4 mg Q3HRS PRN Silvia  Mohan, A.P.N.       • tramadol (ULTRAM) 50 MG tablet 50 mg  50 mg Q4HRS PRN Silvia  Mohan, A.P.N.       • midazolam (VERSED) 2 MG/2ML injection 0.5-2 mg  0.5-2 mg Q HOUR PRN Silvia  Mohan, A.P.N.   2 mg at 12/24/17 0017   • sodium bicarbonate 8.4 % injection 50 mEq  50 mEq Q HOUR PRN Silvia  Mohan, A.P.N.       • morphine (pf) 4 mg/ml injection 4 mg  4 mg Q HOUR PRN Silvia  Mohan, A.P.N.   4 mg at 12/23/17 0239   • ondansetron (ZOFRAN) syringe/vial injection 4 mg  4 mg Q6HRS PRN Silvia Preston, A.P.N.        Or   • prochlorperazine (COMPAZINE) injection 10 mg  10 mg Q6HRS PRN Silvia  Mohan, A.P.N.        Or   • promethazine (PHENERGAN) suppository 25 mg  25 mg Q6HRS PRN Silvia  Mohan, A.P.N.       • acetaminophen (TYLENOL) tablet 650 mg  650 mg Q4HRS PRN Silvia  Mohan, A.P.N.        Or   • acetaminophen (TYLENOL) suppository 650 mg  650 mg Q4HRS PRN Silvia  Mohan, A.P.N.       • mag hydrox-al hydrox-simeth (MAALOX PLUS ES or MYLANTA DS) suspension 30 mL  30 mL Q4HRS PRN Silvia  Mohan, A.P.N.       • diphenhydrAMINE (BENADRYL) tablet/capsule 25 mg  25 mg HS PRN - MR X 1 Silvia Preston, A.P.N.       • epinephrine 1 mg/mL(1:1000) 8 mg in  mL Infusion  0-0.2 mcg/kg/min Continuous Kiel ALISSA Ash M.D. 17.3 mL/hr at 12/25/17 0718 0.03 mcg/kg/min at 12/25/17 0718   • dexmedetomidine (PRECEDEX) 400 mcg/100mL premix infusion  0.1-1.5 mcg/kg/hr Continuous Silvia Preston, A.P.N. 57.5 mL/hr at 12/25/17 0941 1.5 mcg/kg/hr at 12/25/17 0941   • propofol (DIPRIVAN) injection  0-80 mcg/kg/min Continuous Silvia Preston, A.P.N. 23 mL/hr at 12/25/17 0745 25 mcg/kg/min at 12/25/17 0745   • albuterol inhaler 2 Puff  2 Puff Q4H PRN (RT) Kiel Ash M.D.       • carvedilol (COREG) tablet 6.25 mg  6.25 mg BID WITH MEALS MILTON PriceR.N.   Stopped at 12/22/17 8374   • lisinopril (PRINIVIL) tablet 5 mg  5 mg  Q DAY MILTON PriceRAlexN.   Stopped at 12/22/17 0900   • alprazolam (XANAX) tablet 0.25 mg  0.25 mg Q6HRS PRN KAMALA AlfredP.N.       • rosuvastatin (CRESTOR) tablet 20 mg  20 mg Q EVENING KAMALA MendozaP.NAlex   20 mg at 12/24/17 2112   • ipratropium-albuterol (DUONEB) nebulizer solution 3 mL  3 mL Q4H PRN (RT) Kiel Ash M.D.   3 mL at 12/23/17 1154     Last reviewed on 12/22/2017  7:07 AM by Katie Aly R.N.    Quality  Measures:  Labs reviewed, Medications reviewed and Radiology images reviewed                      Assessment/Plan:  Status post 2-vessel coronary artery bypass grafting on 12/22/2017.   - IABP at 1:1   - I have titrated vasopressors, as above   - chest tubes per CTS   - albumin , close assessment supportive volume status  Acute hypoxic respiratory failure secondary to above.   - intubated 12/22   - continue full vent support, not appropriate for SBT, vent adjustments made today   - I will not make any weaning efforts until hemodynamic status stabilized   - We'll initiate diuresis once hemodynamic status improved, remains hypotensive today  Coronary artery disease with multivessel disease.   - s/p CABG  Cardiomyopathy EF 35% and global systolic dysfunction.  Severely dilated right ventricle  Chronic obstructive pulmonary disease.  Reported diabetes.   - ssi and q4 fsbs  Dyslipidemia.  Clinically with obstructive sleep apnea.  The patient is critically ill. He is at high risk for further vital organ deterioration. I have titrated vasopressors and adjust ventilator settings. Further recreational follow.      Discussed patient condition and risk of morbidity and/or mortality with Family, RN, RT, Therapies, Pharmacy and [facility].    The patient remains critically ill.  Critical care time = 34 minutes in directly providing and coordinating critical care and extensive data review.  No time overlap and excludes procedures.

## 2017-12-25 NOTE — PROGRESS NOTES
Spoke with Radha Martinez on phone in regards to patient's status.  Review of SWAN numbers, urine output, lab values, I&O (for shift), and EKG/rhythm on NK.    Review of Pacer needs and changes.  That rate is now set at 60, only A wires connected.  And need for A and V pacing for short period of time.    Orders received to replace K+, Amio bolus, IV lasix and RN communication orders.   If patient converts to A Fib, Amio gtt to be started per protocol  If urine output remains below 30ml/hr, no need for call, and topic will be addressed in the morning (12/25).    Orders entered and implemented.

## 2017-12-25 NOTE — PROGRESS NOTES
12 hour chart check    Monitor summary:  SB-SR 55-85.    100% Atrial Paced, small time of 100% V Paced  First Degree Heart Block   Frequent PVC and occasional PAC

## 2017-12-25 NOTE — PROGRESS NOTES
Monitor Summary: Patient mainly in 100% atrial paced sinus rhythm 60s-80s with 1st degree HB while converting in and out of A-fib/flutter occasionally and having frequent PVCs with occasional PACs.     12 hour chart check

## 2017-12-26 ENCOUNTER — APPOINTMENT (OUTPATIENT)
Dept: RADIOLOGY | Facility: MEDICAL CENTER | Age: 52
DRG: 003 | End: 2017-12-26
Attending: INTERNAL MEDICINE
Payer: MEDICARE

## 2017-12-26 LAB
ACT BLD: 125 SEC (ref 74–137)
ACT BLD: 131 SEC (ref 74–137)
ACT BLD: 450 SEC (ref 74–137)
ACT BLD: 505 SEC (ref 74–137)
ACTION RANGE TRIGGERED IACRT: NO
ACTION RANGE TRIGGERED IACRT: YES
ANION GAP SERPL CALC-SCNC: 6 MMOL/L (ref 0–11.9)
BASE EXCESS BLDA CALC-SCNC: -3 MMOL/L (ref -4–3)
BASE EXCESS BLDA CALC-SCNC: 2 MMOL/L (ref -4–3)
BASE EXCESS BLDA CALC-SCNC: 4 MMOL/L (ref -4–3)
BASE EXCESS BLDA CALC-SCNC: 4 MMOL/L (ref -4–3)
BASE EXCESS BLDA CALC-SCNC: 5 MMOL/L (ref -4–3)
BASE EXCESS BLDV CALC-SCNC: 5 MMOL/L (ref -4–3)
BODY TEMPERATURE: ABNORMAL DEGREES
BODY TEMPERATURE: NORMAL DEGREES
BUN SERPL-MCNC: 27 MG/DL (ref 8–22)
CA-I BLD ISE-SCNC: 1.1 MMOL/L (ref 1.1–1.3)
CA-I BLD ISE-SCNC: 1.11 MMOL/L (ref 1.1–1.3)
CA-I BLD ISE-SCNC: 1.13 MMOL/L (ref 1.1–1.3)
CA-I BLD ISE-SCNC: 1.2 MMOL/L (ref 1.1–1.3)
CALCIUM SERPL-MCNC: 7.7 MG/DL (ref 8.5–10.5)
CHLORIDE SERPL-SCNC: 112 MMOL/L (ref 96–112)
CO2 BLDA-SCNC: 21 MMOL/L (ref 20–33)
CO2 BLDA-SCNC: 31 MMOL/L (ref 20–33)
CO2 BLDA-SCNC: 31 MMOL/L (ref 20–33)
CO2 BLDA-SCNC: 32 MMOL/L (ref 20–33)
CO2 BLDA-SCNC: 32 MMOL/L (ref 20–33)
CO2 BLDV-SCNC: 34 MMOL/L (ref 20–33)
CO2 SERPL-SCNC: 21 MMOL/L (ref 20–33)
CREAT SERPL-MCNC: 1.08 MG/DL (ref 0.5–1.4)
EKG IMPRESSION: NORMAL
ERYTHROCYTE [DISTWIDTH] IN BLOOD BY AUTOMATED COUNT: 59.5 FL (ref 35.9–50)
GFR SERPL CREATININE-BSD FRML MDRD: >60 ML/MIN/1.73 M 2
GLUCOSE BLD-MCNC: 125 MG/DL (ref 65–99)
GLUCOSE BLD-MCNC: 129 MG/DL (ref 65–99)
GLUCOSE BLD-MCNC: 136 MG/DL (ref 65–99)
GLUCOSE BLD-MCNC: 138 MG/DL (ref 65–99)
GLUCOSE BLD-MCNC: 164 MG/DL (ref 65–99)
GLUCOSE BLD-MCNC: 171 MG/DL (ref 65–99)
GLUCOSE BLD-MCNC: 206 MG/DL (ref 65–99)
GLUCOSE BLD-MCNC: 208 MG/DL (ref 65–99)
GLUCOSE SERPL-MCNC: 210 MG/DL (ref 65–99)
HCO3 BLDA-SCNC: 20 MMOL/L (ref 17–25)
HCO3 BLDA-SCNC: 29.4 MMOL/L (ref 17–25)
HCO3 BLDA-SCNC: 29.8 MMOL/L (ref 17–25)
HCO3 BLDA-SCNC: 30.1 MMOL/L (ref 17–25)
HCO3 BLDA-SCNC: 30.3 MMOL/L (ref 17–25)
HCO3 BLDV-SCNC: 32 MMOL/L (ref 24–28)
HCT VFR BLD AUTO: 34.3 % (ref 42–52)
HCT VFR BLD CALC: 33 % (ref 42–52)
HCT VFR BLD CALC: 33 % (ref 42–52)
HCT VFR BLD CALC: 34 % (ref 42–52)
HCT VFR BLD CALC: 43 % (ref 42–52)
HGB BLD-MCNC: 10.5 G/DL (ref 14–18)
HGB BLD-MCNC: 11.2 G/DL (ref 14–18)
HGB BLD-MCNC: 11.2 G/DL (ref 14–18)
HGB BLD-MCNC: 11.6 G/DL (ref 14–18)
HGB BLD-MCNC: 14.6 G/DL (ref 14–18)
INST. QUALIFIED PATIENT IIQPT: YES
MCH RBC QN AUTO: 24.2 PG (ref 27–33)
MCHC RBC AUTO-ENTMCNC: 30.6 G/DL (ref 33.7–35.3)
MCV RBC AUTO: 79.2 FL (ref 81.4–97.8)
O2/TOTAL GAS SETTING VFR VENT: 100 %
O2/TOTAL GAS SETTING VFR VENT: 40 %
PCO2 BLDA: 29.1 MMHG (ref 26–37)
PCO2 BLDA: 48 MMHG (ref 26–37)
PCO2 BLDA: 48.9 MMHG (ref 26–37)
PCO2 BLDA: 51.4 MMHG (ref 26–37)
PCO2 BLDA: 59 MMHG (ref 26–37)
PCO2 BLDV: 55.9 MMHG (ref 41–51)
PCO2 TEMP ADJ BLDA: 30.6 MMHG (ref 26–37)
PCO2 TEMP ADJ BLDA: 48 MMHG (ref 26–37)
PCO2 TEMP ADJ BLDA: 48.9 MMHG (ref 26–37)
PCO2 TEMP ADJ BLDA: 51.4 MMHG (ref 26–37)
PCO2 TEMP ADJ BLDA: 59 MMHG (ref 26–37)
PCO2 TEMP ADJ BLDV: 55.9 MMHG (ref 41–51)
PH BLDA: 7.31 [PH] (ref 7.4–7.5)
PH BLDA: 7.38 [PH] (ref 7.4–7.5)
PH BLDA: 7.39 [PH] (ref 7.4–7.5)
PH BLDA: 7.41 [PH] (ref 7.4–7.5)
PH BLDA: 7.45 [PH] (ref 7.4–7.5)
PH BLDV: 7.37 [PH] (ref 7.31–7.45)
PH TEMP ADJ BLDA: 7.31 [PH] (ref 7.4–7.5)
PH TEMP ADJ BLDA: 7.38 [PH] (ref 7.4–7.5)
PH TEMP ADJ BLDA: 7.39 [PH] (ref 7.4–7.5)
PH TEMP ADJ BLDA: 7.41 [PH] (ref 7.4–7.5)
PH TEMP ADJ BLDA: 7.43 [PH] (ref 7.4–7.5)
PH TEMP ADJ BLDV: 7.37 [PH] (ref 7.31–7.45)
PLATELET # BLD AUTO: 237 K/UL (ref 164–446)
PMV BLD AUTO: 10.8 FL (ref 9–12.9)
PO2 BLDA: 230 MMHG (ref 64–87)
PO2 BLDA: 290 MMHG (ref 64–87)
PO2 BLDA: 296 MMHG (ref 64–87)
PO2 BLDA: 337 MMHG (ref 64–87)
PO2 BLDA: 69 MMHG (ref 64–87)
PO2 BLDV: 52 MMHG (ref 25–40)
PO2 TEMP ADJ BLDA: 230 MMHG (ref 64–87)
PO2 TEMP ADJ BLDA: 290 MMHG (ref 64–87)
PO2 TEMP ADJ BLDA: 296 MMHG (ref 64–87)
PO2 TEMP ADJ BLDA: 337 MMHG (ref 64–87)
PO2 TEMP ADJ BLDA: 74 MMHG (ref 64–87)
PO2 TEMP ADJ BLDV: 52 MMHG (ref 25–40)
POTASSIUM BLD-SCNC: 3.8 MMOL/L (ref 3.6–5.5)
POTASSIUM BLD-SCNC: 4.5 MMOL/L (ref 3.6–5.5)
POTASSIUM BLD-SCNC: 4.6 MMOL/L (ref 3.6–5.5)
POTASSIUM BLD-SCNC: 4.9 MMOL/L (ref 3.6–5.5)
POTASSIUM BLD-SCNC: 5 MMOL/L (ref 3.6–5.5)
POTASSIUM SERPL-SCNC: 3.2 MMOL/L (ref 3.6–5.5)
POTASSIUM SERPL-SCNC: 3.4 MMOL/L (ref 3.6–5.5)
POTASSIUM SERPL-SCNC: 3.5 MMOL/L (ref 3.6–5.5)
POTASSIUM SERPL-SCNC: 3.6 MMOL/L (ref 3.6–5.5)
RBC # BLD AUTO: 4.33 M/UL (ref 4.7–6.1)
SAO2 % BLDA: 100 % (ref 93–99)
SAO2 % BLDA: 95 % (ref 93–99)
SAO2 % BLDV: 84 %
SODIUM BLD-SCNC: 135 MMOL/L (ref 135–145)
SODIUM BLD-SCNC: 135 MMOL/L (ref 135–145)
SODIUM BLD-SCNC: 136 MMOL/L (ref 135–145)
SODIUM BLD-SCNC: 137 MMOL/L (ref 135–145)
SODIUM BLD-SCNC: 138 MMOL/L (ref 135–145)
SODIUM SERPL-SCNC: 139 MMOL/L (ref 135–145)
SPECIMEN DRAWN FROM PATIENT: ABNORMAL
SPECIMEN DRAWN FROM PATIENT: NORMAL
WBC # BLD AUTO: 26.8 K/UL (ref 4.8–10.8)

## 2017-12-26 PROCEDURE — 700105 HCHG RX REV CODE 258: Performed by: THORACIC SURGERY (CARDIOTHORACIC VASCULAR SURGERY)

## 2017-12-26 PROCEDURE — 71010 DX-CHEST-PORTABLE (1 VIEW): CPT

## 2017-12-26 PROCEDURE — 700111 HCHG RX REV CODE 636 W/ 250 OVERRIDE (IP): Performed by: INTERNAL MEDICINE

## 2017-12-26 PROCEDURE — C1751 CATH, INF, PER/CENT/MIDLINE: HCPCS

## 2017-12-26 PROCEDURE — 700102 HCHG RX REV CODE 250 W/ 637 OVERRIDE(OP): Performed by: INTERNAL MEDICINE

## 2017-12-26 PROCEDURE — 94003 VENT MGMT INPAT SUBQ DAY: CPT

## 2017-12-26 PROCEDURE — 700111 HCHG RX REV CODE 636 W/ 250 OVERRIDE (IP): Performed by: NURSE PRACTITIONER

## 2017-12-26 PROCEDURE — 36556 INSERT NON-TUNNEL CV CATH: CPT

## 2017-12-26 PROCEDURE — 700102 HCHG RX REV CODE 250 W/ 637 OVERRIDE(OP): Performed by: CLINICAL NURSE SPECIALIST

## 2017-12-26 PROCEDURE — 700105 HCHG RX REV CODE 258

## 2017-12-26 PROCEDURE — 700105 HCHG RX REV CODE 258: Performed by: CLINICAL NURSE SPECIALIST

## 2017-12-26 PROCEDURE — 93010 ELECTROCARDIOGRAM REPORT: CPT | Performed by: INTERNAL MEDICINE

## 2017-12-26 PROCEDURE — 700111 HCHG RX REV CODE 636 W/ 250 OVERRIDE (IP): Performed by: CLINICAL NURSE SPECIALIST

## 2017-12-26 PROCEDURE — 700102 HCHG RX REV CODE 250 W/ 637 OVERRIDE(OP): Performed by: THORACIC SURGERY (CARDIOTHORACIC VASCULAR SURGERY)

## 2017-12-26 PROCEDURE — A9270 NON-COVERED ITEM OR SERVICE: HCPCS | Performed by: INTERNAL MEDICINE

## 2017-12-26 PROCEDURE — 700101 HCHG RX REV CODE 250: Performed by: CLINICAL NURSE SPECIALIST

## 2017-12-26 PROCEDURE — 700111 HCHG RX REV CODE 636 W/ 250 OVERRIDE (IP): Performed by: THORACIC SURGERY (CARDIOTHORACIC VASCULAR SURGERY)

## 2017-12-26 PROCEDURE — A9270 NON-COVERED ITEM OR SERVICE: HCPCS | Performed by: THORACIC SURGERY (CARDIOTHORACIC VASCULAR SURGERY)

## 2017-12-26 PROCEDURE — 700105 HCHG RX REV CODE 258: Performed by: INTERNAL MEDICINE

## 2017-12-26 PROCEDURE — 37799 UNLISTED PX VASCULAR SURGERY: CPT

## 2017-12-26 PROCEDURE — 700105 HCHG RX REV CODE 258: Performed by: NURSE PRACTITIONER

## 2017-12-26 PROCEDURE — 770022 HCHG ROOM/CARE - ICU (200)

## 2017-12-26 PROCEDURE — 82962 GLUCOSE BLOOD TEST: CPT | Mod: 91

## 2017-12-26 PROCEDURE — A9270 NON-COVERED ITEM OR SERVICE: HCPCS | Performed by: CLINICAL NURSE SPECIALIST

## 2017-12-26 PROCEDURE — 85027 COMPLETE CBC AUTOMATED: CPT

## 2017-12-26 PROCEDURE — 84132 ASSAY OF SERUM POTASSIUM: CPT

## 2017-12-26 PROCEDURE — 82803 BLOOD GASES ANY COMBINATION: CPT

## 2017-12-26 PROCEDURE — 80048 BASIC METABOLIC PNL TOTAL CA: CPT

## 2017-12-26 PROCEDURE — 93005 ELECTROCARDIOGRAM TRACING: CPT | Performed by: NURSE PRACTITIONER

## 2017-12-26 RX ORDER — ASPIRIN 81 MG/1
81 TABLET, CHEWABLE ORAL DAILY
Status: DISCONTINUED | OUTPATIENT
Start: 2017-12-26 | End: 2018-01-10 | Stop reason: HOSPADM

## 2017-12-26 RX ORDER — POTASSIUM CHLORIDE 14.9 MG/ML
20 INJECTION INTRAVENOUS ONCE
Status: COMPLETED | OUTPATIENT
Start: 2017-12-26 | End: 2017-12-27

## 2017-12-26 RX ORDER — POTASSIUM CHLORIDE 14.9 MG/ML
20 INJECTION INTRAVENOUS ONCE
Status: COMPLETED | OUTPATIENT
Start: 2017-12-26 | End: 2017-12-26

## 2017-12-26 RX ORDER — SODIUM CHLORIDE 9 MG/ML
INJECTION, SOLUTION INTRAVENOUS
Status: COMPLETED
Start: 2017-12-26 | End: 2017-12-26

## 2017-12-26 RX ORDER — TETRAHYDROZOLINE HCL 0.05 %
1 DROPS OPHTHALMIC (EYE) 4 TIMES DAILY PRN
Status: DISCONTINUED | OUTPATIENT
Start: 2017-12-26 | End: 2018-01-06

## 2017-12-26 RX ADMIN — DEXMEDETOMIDINE HYDROCHLORIDE 1 MCG/KG/HR: 4 INJECTION, SOLUTION INTRAVENOUS at 12:24

## 2017-12-26 RX ADMIN — PROPOFOL 15 MCG/KG/MIN: 10 INJECTION, EMULSION INTRAVENOUS at 04:30

## 2017-12-26 RX ADMIN — MUPIROCIN 1 APPLICATION: 20 OINTMENT TOPICAL at 07:53

## 2017-12-26 RX ADMIN — DEXMEDETOMIDINE HYDROCHLORIDE 1 MCG/KG/HR: 4 INJECTION, SOLUTION INTRAVENOUS at 22:00

## 2017-12-26 RX ADMIN — ROSUVASTATIN CALCIUM 20 MG: 20 TABLET, FILM COATED ORAL at 21:21

## 2017-12-26 RX ADMIN — SODIUM CHLORIDE: 9 INJECTION, SOLUTION INTRAVENOUS at 08:10

## 2017-12-26 RX ADMIN — FAMOTIDINE 20 MG: 10 INJECTION, SOLUTION INTRAVENOUS at 21:29

## 2017-12-26 RX ADMIN — EPINEPHRINE 0.03 MCG/KG/MIN: 1 INJECTION, SOLUTION INTRAMUSCULAR; SUBCUTANEOUS at 06:36

## 2017-12-26 RX ADMIN — Medication 50 MCG/HR: at 22:18

## 2017-12-26 RX ADMIN — DEXMEDETOMIDINE HYDROCHLORIDE 1 MCG/KG/HR: 4 INJECTION, SOLUTION INTRAVENOUS at 04:20

## 2017-12-26 RX ADMIN — POTASSIUM BICARBONATE 50 MEQ: 25 TABLET, EFFERVESCENT ORAL at 21:29

## 2017-12-26 RX ADMIN — POTASSIUM CHLORIDE 20 MEQ: 2 INJECTION, SOLUTION, CONCENTRATE INTRAVENOUS at 16:00

## 2017-12-26 RX ADMIN — PROPOFOL 30 MCG/KG/MIN: 10 INJECTION, EMULSION INTRAVENOUS at 22:47

## 2017-12-26 RX ADMIN — AMIODARONE HYDROCHLORIDE 1 MG/MIN: 50 INJECTION, SOLUTION INTRAVENOUS at 16:15

## 2017-12-26 RX ADMIN — DEXMEDETOMIDINE HYDROCHLORIDE 1 MCG/KG/HR: 4 INJECTION, SOLUTION INTRAVENOUS at 19:32

## 2017-12-26 RX ADMIN — POTASSIUM CHLORIDE 10 MEQ: 2 INJECTION, SOLUTION, CONCENTRATE INTRAVENOUS at 04:30

## 2017-12-26 RX ADMIN — POTASSIUM CHLORIDE 20 MEQ: 14.9 INJECTION, SOLUTION INTRAVENOUS at 06:20

## 2017-12-26 RX ADMIN — DEXMEDETOMIDINE HYDROCHLORIDE 1 MCG/KG/HR: 4 INJECTION, SOLUTION INTRAVENOUS at 14:54

## 2017-12-26 RX ADMIN — INSULIN HUMAN 5 UNITS: 100 INJECTION, SUSPENSION SUBCUTANEOUS at 22:06

## 2017-12-26 RX ADMIN — SODIUM CHLORIDE: 9 INJECTION, SOLUTION INTRAVENOUS at 23:00

## 2017-12-26 RX ADMIN — CHLORHEXIDINE GLUCONATE 15 ML: 1.2 RINSE ORAL at 21:59

## 2017-12-26 RX ADMIN — BISACODYL 10 MG: 10 SUPPOSITORY RECTAL at 22:00

## 2017-12-26 RX ADMIN — INSULIN HUMAN 5 UNITS: 100 INJECTION, SUSPENSION SUBCUTANEOUS at 06:19

## 2017-12-26 RX ADMIN — ENOXAPARIN SODIUM 40 MG: 100 INJECTION SUBCUTANEOUS at 09:55

## 2017-12-26 RX ADMIN — PROPOFOL 70 MCG/KG/MIN: 10 INJECTION, EMULSION INTRAVENOUS at 16:35

## 2017-12-26 RX ADMIN — NYSTATIN 500000 UNITS: 100000 SUSPENSION ORAL at 21:29

## 2017-12-26 RX ADMIN — DEXMEDETOMIDINE HYDROCHLORIDE 1 MCG/KG/HR: 4 INJECTION, SOLUTION INTRAVENOUS at 16:52

## 2017-12-26 RX ADMIN — DEXMEDETOMIDINE HYDROCHLORIDE 1 MCG/KG/HR: 4 INJECTION, SOLUTION INTRAVENOUS at 01:30

## 2017-12-26 RX ADMIN — POTASSIUM CHLORIDE 20 MEQ: 2 INJECTION, SOLUTION, CONCENTRATE INTRAVENOUS at 01:52

## 2017-12-26 RX ADMIN — BISACODYL 10 MG: 10 SUPPOSITORY RECTAL at 21:29

## 2017-12-26 RX ADMIN — PROPOFOL 30 MCG/KG/MIN: 10 INJECTION, EMULSION INTRAVENOUS at 19:22

## 2017-12-26 RX ADMIN — FUROSEMIDE 5 MG/HR: 10 INJECTION, SOLUTION INTRAMUSCULAR; INTRAVENOUS at 04:15

## 2017-12-26 RX ADMIN — DEXMEDETOMIDINE HYDROCHLORIDE 1 MCG/KG/HR: 4 INJECTION, SOLUTION INTRAVENOUS at 09:51

## 2017-12-26 RX ADMIN — NYSTATIN 500000 UNITS: 100000 SUSPENSION ORAL at 17:25

## 2017-12-26 RX ADMIN — PROPOFOL 70 MCG/KG/MIN: 10 INJECTION, EMULSION INTRAVENOUS at 15:14

## 2017-12-26 RX ADMIN — CLOPIDOGREL 75 MG: 75 TABLET, FILM COATED ORAL at 07:53

## 2017-12-26 RX ADMIN — PHENYLEPHRINE HYDROCHLORIDE 40 MCG/MIN: 10 INJECTION INTRAVENOUS at 22:49

## 2017-12-26 RX ADMIN — FAMOTIDINE 20 MG: 20 TABLET, FILM COATED ORAL at 07:53

## 2017-12-26 RX ADMIN — MUPIROCIN 1 APPLICATION: 20 OINTMENT TOPICAL at 21:29

## 2017-12-26 RX ADMIN — POTASSIUM BICARBONATE 50 MEQ: 25 TABLET, EFFERVESCENT ORAL at 12:31

## 2017-12-26 RX ADMIN — PROPOFOL 20 MCG/KG/MIN: 10 INJECTION, EMULSION INTRAVENOUS at 12:24

## 2017-12-26 RX ADMIN — ASPIRIN 81 MG: 81 TABLET, CHEWABLE ORAL at 08:12

## 2017-12-26 RX ADMIN — INSULIN HUMAN 5 UNITS: 100 INJECTION, SUSPENSION SUBCUTANEOUS at 14:55

## 2017-12-26 RX ADMIN — AMIODARONE HYDROCHLORIDE 1 MG/MIN: 50 INJECTION, SOLUTION INTRAVENOUS at 08:32

## 2017-12-26 RX ADMIN — CHLORHEXIDINE GLUCONATE 15 ML: 1.2 RINSE ORAL at 07:53

## 2017-12-26 RX ADMIN — DEXMEDETOMIDINE HYDROCHLORIDE 1 MCG/KG/HR: 4 INJECTION, SOLUTION INTRAVENOUS at 07:08

## 2017-12-26 ASSESSMENT — LIFESTYLE VARIABLES: REASON UNABLE TO ASSESS: INTUBATED

## 2017-12-26 NOTE — PROGRESS NOTES
Monitor summary:      SR with frequent PAC's as well as PVC's.  Rates between 60's and 80's intermittently A & V paced.      .22/.08/.44

## 2017-12-26 NOTE — PROGRESS NOTES
Pulmonary Critical Care Progress Note      DOS:  12/26/2017    Chief Complaint: CAD, 2V CABG    History of Present Illness: 53 y/o M, h/o DM, COPD, DLD, CAD, SHASHANK, Obesity; underwent 2v CABG 12/22    Interval Events:  24 hour interval history reviewed    - SR, no pacing, 1st AVB   - epi gtt, jazz 40; IABP 4:1   - PAC, not wedging; remove today   - Tm 38.4   - williams TF at goal   - lasix gtt 5/hr   - vent day 5, 10, 40%   - cxr, effusions, edema   - add KCL 50 q 12   - amio gtt   - start lovenox   - prop/fent/dex  Yesterday:   - s/p CABG x 2   - more calm, dex 1.5, propofol low dose propofol   - IABP 2:1, paced, PVCs   - epi, jazz   - lasix gtt started today   - Tm 37.8   - williams TF   - 4.5 L UOP   - vent day 4, 26/10, 40%   - no wean   - Chest film improving bilat infs/edema   - starting K scale      Review of Systems   Unable to perform ROS: Acuity of condition       Physical Exam   Constitutional: He appears well-developed and well-nourished.   HENT:   Head: Normocephalic.   Eyes: Pupils are equal, round, and reactive to light. No scleral icterus.   Neck: No tracheal deviation present.   Cardiovascular: Normal rate.    Pulmonary/Chest:   Diminished throughout   Abdominal: Soft. He exhibits no distension.   Musculoskeletal: He exhibits edema.   Neurological: No cranial nerve deficit.   Skin: Skin is warm and dry.   Psychiatric:   sedate   Nursing note and vitals reviewed.        PFSH:  No change.    Respiratory:  Leo Vent Mode: APVCMV, Rate (breaths/min): 26, Vt Target (mL): 600, PEEP/CPAP: 10, FiO2: 40, Static Compliance (ml / cm H2O): 43, Control VTE (exp VT): 610  Pulse Oximetry: 96 %  Chest Tube Drains:  Francisco Javier Girard 1: 40 ml  Mediastinal Chest Tube 1: 0 ml      Recent Labs      12/23/17   2103  12/25/17   0041  12/26/17   0518   ISTATAPH  7.446  7.488  7.445   ISTATAPCO2  29.2  25.9*  29.1   ISTATAPO2  61*  65  69   ISTATATCO2  21  20  21   IYHLDPH2QNJ  93  94  95   ISTATARTHCO3  20.1  19.7  20.0   ISTATARTBE   -3  -2  -3   ISTATTEMP  37.9 C  37.5 C  38.1 C   ISTATFIO2  60  40  40   ISTATSPEC  Arterial  Arterial  Arterial   ISTATAPHTC  7.433  7.481  7.429   [id number]  65  67  74       HemoDynamics:  Pulse: 77, Heart Rate (Monitored): 60  Arterial BP: 100/43, NIBP: (!) 99/48  CVP (mm Hg): (!) 12 MM HG            Neuro:  GCS Total Enrique Coma Score: 6         Fluids:  Intake/Output       12/24/17 0700 - 12/25/17 0659 12/25/17 0700 - 12/26/17 0659 12/26/17 0700 - 12/27/17 0659      3382-6515 0393-6214 Total [id number] 1900-0659 Total 4814-7018 7548-7216 Total       Intake    I.V.  2896.1  1718.7 4614.8  2093.2  1489.9 3583.1  558.5  -- 558.5    Precedex Volume  624.1 460.8 1084.9 115.2 -- 115.2    Amiodarone Volume -- -- -- 200 396 596 99 -- 99    Phenylephrine Volume 87.2 97.2 184.4 158.5 201 359.5 45 -- 45    Propofol Volume 276 276 552 276 209.5 485.5 41.4 -- 41.4    Epinephrine Volume 275.9 218.5 494.5 207.6 150.6 358.2 51.9 -- 51.9    IV Piggyback Volume 1225 75 1300 450 -- 450 100 -- 100    NS  350 680 120 -- 120 70 -- 70    IV Volume (Fentanyl ) 12 12 24 12 12 24 6 -- 6    IV Volume (Lasix) -- -- -- 45 60 105 30 -- 30    Other  --  -- --  --  90 90  --  -- --    Medications (P.O./ Enteral Liquids) -- -- -- -- 90 90 -- -- --    Enteral  --  390 390  480  950 1430  360  -- 360    Enteral Volume -- 300 300  240 -- 240    Free Water / Tube Flush -- 90 90 -- 90 90 120 -- 120    Total Intake 2896.1 2108.7 5004.8 2573.2 2529.9 5103.1 918.5 -- 918.5       Output    Urine  155  4400 4555  1535  2200 3735  555  -- 555    Indwelling Cathether 155 4400 4555 1535 2200 3735 555 -- 555    Drains  70  80 150  90  165 255  10  -- 10    Mediastinal Chest Tube 1 60 80 140 40 70 110 10 -- 10    Francisco Javier Girard 1 10 0 10 50 95 145 -- -- --    Total Output 225 4480 4705 1625 2365 3990 565 -- 565       Net I/O     2671.1 -2371.3 299.8 948.2 164.9 1113.1 353.5 -- 353.5        Weight: (!) 154.2 kg (339 lb 15.2  oz)  Recent Labs      17   1035  17   1523  17   0450   17   1745  17   0000  17   05   SODIUM  138  140   --   140   --    --    --   139   POTASSIUM  4.1  3.8  3.3*  3.3*   < >  3.4*  3.2*  3.4*   CHLORIDE  108  111   --   112   --    --    --   112   CO2  22  20   --   22   --    --    --   21   BUN  20  21   --   21   --    --    --   27*   CREATININE  1.10  0.97   --   0.92   --    --    --   1.08   MAGNESIUM  2.5   --   2.1   --    --    --    --    --    CALCIUM  7.9*  8.2*   --   8.0*   --    --    --   7.7*    < > = values in this interval not displayed.       GI/Nutrition:  Liver Function  Recent Labs      17   0520   ALTSGPT  10   --    --    --    ASTSGOT  38   --    --    --    ALKPHOSPHAT  90   --    --    --    TBILIRUBIN  0.9   --    --    --    PREALBUMIN   --    --   4.0*   --    GLUCOSE  162*  147*  146*  210*       Heme:  Recent Labs      17   0520   RBC  4.98  4.68*  4.36*  4.33*   HEMOGLOBIN  11.9*  11.3*  10.4*  10.5*   HEMATOCRIT  39.8*  36.8*  34.4*  34.3*   PLATELETCT  377  246  194  237   PROTHROMBTM  16.5*   --    --    --    APTT  34.5   --    --    --    INR  1.36*   --    --    --        Infectious Disease:  Temp  Av.3 °C (100.9 °F)  Min: 38 °C (100.4 °F)  Max: 38.7 °C (101.7 °F)  Micro: cultures reviewed  Recent Labs      17   0520   WBC  30.5*  32.4*  25.7*  26.8*   NEUTSPOLYS  83.50*   --    --    --    LYMPHOCYTES  2.60*   --    --    --    MONOCYTES  5.20   --    --    --    EOSINOPHILS  0.00   --    --    --    BASOPHILS  0.00   --    --    --    ASTSGOT  38   --    --    --    ALTSGPT  10   --    --    --    ALKPHOSPHAT  90   --    --    --    TBILIRUBIN  0.9   --    --    --      Current Facility-Administered Medications   Medication Dose Frequency  Provider Last Rate Last Dose   • aspirin (ASA) chewable tab 81 mg  81 mg DAILY Kiel Ash M.D.   81 mg at 12/26/17 0812   • furosemide (LASIX) 100 mg in  mL infusion  5 mg/hr Continuous Radha Mratinez A.P.N. 5 mL/hr at 12/26/17 0415 5 mg/hr at 12/26/17 0415   • K+ Scale: Goal of 4.5  1 Each Q6HRS Radha Martinez A.P.N.   1 Each at 12/26/17 0600   • amiodarone (CORDARONE) 450 mg in D5W 250 mL Infusion  1 mg/min Continuous Radha Martinez A.P.N. 33 mL/hr at 12/26/17 0832 1 mg/min at 12/26/17 0832   • insulin NPH (HUMULIN,NOVOLIN) injection 5 Units  5 Units Q8HRS Silvia Preston A.P.N.   5 Units at 12/26/17 0619   • insulin lispro (HUMALOG) injection 4 Units  4 Units TID AC Silvia Preston, A.P.N.   4 Units at 12/26/17 0620   • phenylephrine (KRISTIN-SYNEPHRINE) 80,000 mcg in  mL Infusion  0-50 mcg/min Continuous Radha Martinez A.P.N. 15 mL/hr at 12/26/17 0500 40 mcg/min at 12/26/17 0500   • famotidine (PEPCID) tablet 20 mg  20 mg BID Rodolfo Landa M.D.   20 mg at 12/26/17 0753    Or   • famotidine (PEPCID) injection 20 mg  20 mg BID Rodolfo Landa M.D.   20 mg at 12/25/17 2045   • chlorhexidine (PERIDEX) 0.12 % solution 15 mL  15 mL BID Rodolfo Landa M.D.   15 mL at 12/26/17 0753   • fentaNYL (SUBLIMAZE) 50 mcg/mL in 50mL (Continuous Infusion)   Continuous Rodolfo Landa M.D. 1 mL/hr at 12/25/17 0515 50 mcg/hr at 12/25/17 0515   • insulin lispro (HUMALOG) injection 0-15 Units  0-15 Units Q6HRS KAMALA AlfredPMAMADOU   6 Units at 12/26/17 0619   • Respiratory Care per Protocol   Continuous RT KAMALA MendozaP.N.       • NS infusion   Continuous SUSI Mendoza.P.N. 10 mL/hr at 12/26/17 0810     • Pharmacy Consult Request ...Pain Management Review 1 Each  1 Each PRN SUSI Mendoza.P.N.       • docusate sodium (COLACE) capsule 100 mg  100 mg QAM Silvia Preston A.P.N.   Stopped at 12/22/17 1345    And   • senna-docusate (PERICOLACE or SENOKOT S) 8.6-50 MG per tablet 1 Tab  1 Tab  Nightly Silvia Preston, A.P.N.   1 Tab at 12/25/17 2045    And   • senna-docusate (PERICOLACE or SENOKOT S) 8.6-50 MG per tablet 1 Tab  1 Tab Q24HRS PRN Silvia Preston, A.P.N.        And   • lactulose 20 GM/30ML solution 30 mL  30 mL Q24HRS PRN Silvia Preston, A.P.N.        And   • bisacodyl (DULCOLAX) suppository 10 mg  10 mg Q24HRS PRN Silvia Preston, A.P.N.        And   • fleet enema 133 mL  1 Each Once PRN Silvia Preston, A.P.N.       • enoxaparin (LOVENOX) inj 40 mg  40 mg DAILY Silvia  Mohan, A.P.N.   40 mg at 12/26/17 0955   • mupirocin (BACTROBAN) 2 % ointment 1 Application  1 Application BID Silvia Preston A.P.N.   1 Application at 12/26/17 0753   • clopidogrel (PLAVIX) tablet 75 mg  75 mg DAILY Silvia Preston, A.P.N.   75 mg at 12/26/17 0753   • electrolyte-A (PLASMALYTE-A) infusion   PRN Silvia Preston, A.P.N.       • oxycodone immediate release (ROXICODONE) tablet 5 mg  5 mg Q3HRS PRN Silvia Preston, A.P.N.       • oxycodone immediate release (ROXICODONE) tablet 10 mg  10 mg Q3HRS PRN Silvia Preston, A.P.N.       • morphine (pf) 4 mg/ml injection 4 mg  4 mg Q3HRS PRN Silvia Preston, A.P.N.       • tramadol (ULTRAM) 50 MG tablet 50 mg  50 mg Q4HRS PRN Silvia Preston, A.P.N.       • midazolam (VERSED) 2 MG/2ML injection 0.5-2 mg  0.5-2 mg Q HOUR PRN Silvia Preston, A.P.N.   2 mg at 12/24/17 0017   • sodium bicarbonate 8.4 % injection 50 mEq  50 mEq Q HOUR PRN Silvia Preston, A.P.N.       • morphine (pf) 4 mg/ml injection 4 mg  4 mg Q HOUR PRN Silvia Preston, A.P.N.   4 mg at 12/23/17 0239   • ondansetron (ZOFRAN) syringe/vial injection 4 mg  4 mg Q6HRS PRN Silvia Preston, A.P.N.        Or   • prochlorperazine (COMPAZINE) injection 10 mg  10 mg Q6HRS PRN Silvia Preston, A.P.N.        Or   • promethazine (PHENERGAN) suppository 25 mg  25 mg Q6HRS PRN Silvia Preston, A.P.N.       • acetaminophen (TYLENOL) tablet 650 mg  650 mg Q4HRS PRN Silvia Preston, A.P.N.   650 mg at 12/25/17 1910    Or   •  acetaminophen (TYLENOL) suppository 650 mg  650 mg Q4HRS PRN Silvia Preston A.P.N.       • mag hydrox-al hydrox-simeth (MAALOX PLUS ES or MYLANTA DS) suspension 30 mL  30 mL Q4HRS PRN Silvia Preston A.P.N.       • diphenhydrAMINE (BENADRYL) tablet/capsule 25 mg  25 mg HS PRN - MR X 1 Silvia Preston A.P.N.       • epinephrine 1 mg/mL(1:1000) 8 mg in  mL Infusion  0-0.2 mcg/kg/min Continuous Kiel Ash M.D. 23 mL/hr at 12/26/17 0953 0.04 mcg/kg/min at 12/26/17 0953   • dexmedetomidine (PRECEDEX) 400 mcg/100mL premix infusion  0.1-1.5 mcg/kg/hr Continuous Silvia Preston A.P.N. 38.4 mL/hr at 12/26/17 0708 1 mcg/kg/hr at 12/26/17 0708   • propofol (DIPRIVAN) injection  0-80 mcg/kg/min Continuous Silvia Preston A.P.N. 13.8 mL/hr at 12/26/17 0430 15 mcg/kg/min at 12/26/17 0430   • albuterol inhaler 2 Puff  2 Puff Q4H PRN (RT) Kiel Ash M.D.       • carvedilol (COREG) tablet 6.25 mg  6.25 mg BID WITH MEALS Rayne Mcdowell A.P.R.N.   Stopped at 12/22/17 1730   • lisinopril (PRINIVIL) tablet 5 mg  5 mg Q DAY Rayne Mcdowell A.P.R.N.   Stopped at 12/22/17 0900   • alprazolam (XANAX) tablet 0.25 mg  0.25 mg Q6HRS PRN Radha Martinez A.P.N.       • rosuvastatin (CRESTOR) tablet 20 mg  20 mg Q EVENING Silvia Preston A.P.N.   20 mg at 12/25/17 2044   • ipratropium-albuterol (DUONEB) nebulizer solution 3 mL  3 mL Q4H PRN (RT) Kiel Ash M.D.   3 mL at 12/23/17 1154     Last reviewed on 12/22/2017  7:07 AM by Katie Aly R.N.    Quality  Measures:  Labs reviewed, Medications reviewed and Radiology images reviewed                      Assessment/Plan:  Status post 2-vessel coronary artery bypass grafting on 12/22/2017.   - IABP weaning down, currently 4:1, plan to discontinue today   - I have titrated vasopressors, PA catheter nonfunctional, removed today   - chest tubes per CTS   - Lasix drip initiated, monitor volume status and hemodynamics closely   - Postoperative brief cardiac  arrest  Acute hypoxic respiratory failure secondary to above.   - intubated 12/22   -Continue full ventilator support today, adjustments made, not appropriate for weaning/liberation   - Remains on higher PEEP, FiO2 40%  Coronary artery disease with multivessel disease.   - s/p CABG  Cardiomyopathy EF 35% and global systolic dysfunction.  Severely dilated right ventricle  Chronic obstructive pulmonary disease.  Reported diabetes.   - ssi and q4 fsbs  Dyslipidemia.  Clinically with obstructive sleep apnea.  The patient is critically ill. He is at high risk for further vital organ deterioration. I have made ventilator adjustments and titrated vasopressors. Continue full ventilator support. Not appropriate for SBT/liberation      Discussed patient condition and risk of morbidity and/or mortality with Family, RN, RT, Therapies, Pharmacy and [facility].    The patient remains critically ill.  Critical care time = 38 minutes in directly providing and coordinating critical care and extensive data review.  No time overlap and excludes procedures.

## 2017-12-26 NOTE — THERAPY
OT referral received. Pt vented, sedated, not appropriate to mobilize at this time. Will continue to monitor and complete eval as indicated.

## 2017-12-26 NOTE — PROGRESS NOTES
Monitor Summary: Intermittent atrial Pacing with a rate of 60, Normal Sinus with a first degree heart block and occasional non-perfusing PVCs with rare PACs. 0.22/0.08/0.44      12 hour chart check

## 2017-12-26 NOTE — CARE PLAN
Problem: Post Op Day 3 CABG/Heart Valve replacement  Goal: Optimal care of the post op CABG/Heart Valve replacement post op day 3    Intervention: Daily Weights  Done    Intervention: Shower daily and clean incisions twice daily with soap and water  Bed rest/Previna  Intervention: Up in chair for all meals  Intubated  Intervention: Ambulate, increasing the distance each time x 3 and before bed  Intubated  Intervention: IS q 1 hour while awake and record best IS volume  Intubated  Intervention: Consider removal of jenkins, chest tube and pacer wire if not already done  Intubated

## 2017-12-26 NOTE — CARE PLAN
Problem: Ventilation Defect:  Goal: Ability to achieve and maintain unassisted ventilation or tolerate decreased levels of ventilator support  Adult Ventilation Update    Total Vent Days: 4    Patient Lines/Drains/Airways Status    Active Airway     Name: Placement date: Placement time: Site: Days:    Airway Group ET Tube Oral 9.0 12/22/17   0753   Oral   3              Cough: Non Productive (12/25/17 1435)  Sputum Amount: Unable to Evaluate (12/25/17 1435)  Sputum Color: Unable to Evaluate (12/25/17 1435)  Sputum Consistency: Unable to Evaluate (12/25/17 1435)    Mobility Group  Activity Performed: Unable to mobilize (12/24/17 2000)  Time Activity Tolerated: 45 min (12/21/17 2030)  Distance Per Occurrence (ft.): 200 feet (12/21/17 2030)  # of Times Distance was Traveled: 2 (12/21/17 2030)  Assistance / Tolerance: No assistance required;Tolerates Well (12/21/17 2030)  Pt Calls for Assistance: No (12/25/17 0800)  Staff Present for Mobilization: RN (12/21/17 2030)  Gait: Steady (12/21/17 2030)  Assistive Devices: None (12/21/17 2030)  Reason Not Mobilized: Unstable condition;Bed rest (12/24/17 2000)  Mobilization Comments: IABP, PEEP 10 (12/24/17 2000)    Events/Summary/Plan: vent check (12/25/17 1435)

## 2017-12-26 NOTE — CARE PLAN
Problem: Nutritional:  Goal: Nutrition support tolerated and meeting greater than 85% of estimated needs  Outcome: PROGRESSING AS EXPECTED  TF running at goal with propofol: Peptamen Intense Very High Protein @ 80 ml/hr. RD following.

## 2017-12-26 NOTE — THERAPY
as prior, remains on vent/seated; still have IABP (may be removed later today); not medically appropriate for initiation of phase I cardiac rehab; will continue to monito

## 2017-12-26 NOTE — PROGRESS NOTES
SWAN numbers:  Please see printed swan numbers which are placed in the paper chart by this RN as they are different from what has been slaved over in EPIC.

## 2017-12-26 NOTE — CARE PLAN
Problem: Post Op Day 3 CABG/Heart Valve replacement  Goal: Optimal care of the post op CABG/Heart Valve replacement post op day 3    Intervention: Daily Weights  Done on NOC shift   Intervention: Shower daily and clean incisions twice daily with soap and water  Pt for CHG bath on NOC shift  Intervention: Up in chair for all meals  Unable to complete due to IABP and vent support  Intervention: Ambulate, increasing the distance each time x 3 and before bed  Unable to complete due to IABP and vent support  Intervention: IS q 1 hour while awake and record best IS volume  Unable to complete due to IABP and vent support  Intervention: Consider removal of jenkins, chest tube and pacer wire if not already done  Unable to complete due to IABP and vent support

## 2017-12-26 NOTE — PROGRESS NOTES
[place] Maddy catheter removed at 1215 with no apparent complications. Capped and IV lines reassigned to cordis line.

## 2017-12-26 NOTE — PROGRESS NOTES
Cardiovascular Surgery Progress Note    Name: Joshua Medrano  MRN: [id number]  : 1965  Admit Date: 2017 10:21 AM  Procedure:  Procedure(s) and Anesthesia Type:     * MULTIPLE CORONARY ARTERY BYPASS ENDO VEIN HARVEST X2 - General     * JIM - General  4 Day Post-Op    Vitals:  Patient Vitals for the past 8 hrs:   Temp SpO2 O2 Delivery Pulse Heart Rate (Monitored) Resp NIBP Weight   17 0900 (!) 38.4 °C (101.1 °F) 96 % - 77 60 (!) 26 (!) 99/48 -   17 0800 (!) 38.2 °C (100.8 °F) 95 % Ventilator 82 60 (!) 26 (!) 96/50 -   17 0710 - 94 % - - 60 - - -   17 0700 (!) 38.1 °C (100.6 °F) 95 % - 72 61 (!) 26 104/56 -   17 0630 - - - 73 61 (!) 26 102/50 -   17 0615 - 99 % - (!) 59 65 (!) 26 106/53 -   17 0600 - 98 % - 65 61 (!) 26 107/56 -   17 0545 - 96 % - 62 60 (!) 26 105/53 -   17 0530 - 95 % - 64 60 (!) 26 104/52 -   17 0515 - 98 % - 66 60 (!) 26 102/56 -   17 0500 - 96 % - 63 61 (!) 26 101/52 -   17 0445 - 96 % - 61 63 (!) 26 105/62 -   17 0430 - 98 % - 65 60 (!) 26 103/59 -   17 0415 - 99 % - 63 62 (!) 26 102/47 -   17 0400 (!) 38.1 °C (100.6 °F) 100 % Ventilator 61 60 (!) 26 103/55 (!) 154.2 kg (339 lb 15.2 oz)   17 0345 - 100 % - 63 62 (!) 26 (!) 99/50 -   17 0330 - 99 % - 65 60 (!) 26 (!) 97/51 -   17 0315 - 98 % - 62 60 (!) 29 - -   17 0300 - 94 % - (!) 167 60 (!) 23 - -   17 0245 - 97 % - 62 60 (!) 26 106/54 -   17 0230 - 96 % - 73 60 (!) 26 (!) 96/57 -   17 0215 - 99 % - 83 61 (!) 26 (!) 90/48 -   17 0200 - 98 % - 82 63 (!) 26 (!) 89/47 -   17 0145 - 99 % - 80 63 (!) 26 - -     Temp (24hrs), Av.3 °C (100.9 °F), Min:38 °C (100.4 °F), Max:38.7 °C (101.7 °F)      Respiratory:  Leo Vent Mode: APVCMV, Rate (breaths/min): 26, PEEP/CPAP: 10, FiO2: 40, P Peak (PIP): 29, P MEAN: 16 Respiration: (!) 26, Pulse Oximetry: 96 %  Chest Tube Group 1 (A) Mediastinal  ANgled 32-Tube Status / Drainage: Patent;Sutured in Place;Small;Serosanguinous, Chest Tube Group 2 (B) Mediastinal Straight 32-Tube Status / Drainage: Patent;Small;Serosanguinous, Chest Tube Group 1 (A) Mediastinal ANgled 32-Device: Dry Closed Drainage System;Suction 20 cm Water, Chest Tube Group 2 (B) Mediastinal Straight 32-Device: Dry Closed Drainage System;Suction 20 cm Water  Chest Tube Drains:  Francisco Javier Girard 1: 40 ml  Mediastinal Chest Tube 1: 0 ml    Fluids:    Intake/Output Summary (Last 24 hours) at 12/26/17 0944  Last data filed at 12/26/17 0900   Gross per 24 hour   Intake           5445.2 ml   Output             4435 ml   Net           1010.2 ml     Admit weight: Weight: (!) 159.2 kg (350 lb 15.6 oz)  Current weight: Weight: (!) 154.2 kg (339 lb 15.2 oz) (12/26/17 0400)    Labs:  Recent Labs      12/24/17   0420  12/25/17   0450  12/26/17   0520   WBC  32.4*  25.7*  26.8*   RBC  4.68*  4.36*  4.33*   HEMOGLOBIN  11.3*  10.4*  10.5*   HEMATOCRIT  36.8*  34.4*  34.3*   MCV  78.6*  78.9*  79.2*   MCH  24.1*  23.9*  24.2*   MCHC  30.7*  30.2*  30.6*   RDW  59.4*  59.4*  59.5*   PLATELETCT  246  194  237   MPV  10.3  10.6  10.8     Recent Labs      12/23/17   1035   NEUTSPOLYS  83.50*   LYMPHOCYTES  2.60*   MONOCYTES  5.20   EOSINOPHILS  0.00   BASOPHILS  0.00   RBCMORPHOLO  Present     Recent Labs      12/24/17   0420   12/25/17   0450   12/25/17   1745  12/26/17   0000  12/26/17   0520   SODIUM  140   --   140   --    --    --   139   POTASSIUM  3.8   < >  3.3*   < >  3.4*  3.2*  3.4*   CHLORIDE  111   --   112   --    --    --   112   CO2  20   --   22   --    --    --   21   GLUCOSE  147*   --   146*   --    --    --   210*   BUN  21   --   21   --    --    --   27*   CREATININE  0.97   --   0.92   --    --    --   1.08   CALCIUM  8.2*   --   8.0*   --    --    --   7.7*    < > = values in this interval not displayed.     Recent Labs      12/23/17   1035   APTT  34.5   INR  1.36*       Medications:  •  aspirin  81 mg     • K+ Scale: Goal of 4.5  1 Each     • insulin NPH  5 Units     • insulin lispro  4 Units     • famotidine  20 mg      Or   • famotidine  20 mg     • chlorhexidine  15 mL     • insulin lispro  0-15 Units     • docusate sodium  100 mg      And   • senna-docusate  1 Tab     • enoxaparin  40 mg     • mupirocin  1 Application     • clopidogrel  75 mg     • carvedilol  6.25 mg     • lisinopril  5 mg     • rosuvastatin  20 mg         Exam:   Review of Systems   Unable to perform ROS: Intubated       Physical Exam   Constitutional: No distress. He is intubated.   Neck: Neck supple.   Cardiovascular: Normal rate and regular rhythm.  Exam reveals friction rub. Exam reveals no gallop.    No murmur heard.  Pulmonary/Chest: Effort normal. He is intubated. No respiratory distress. He has decreased breath sounds in the right lower field and the left lower field.   Abdominal: Soft.   Musculoskeletal: He exhibits edema.   Neurological:   sedated   Skin: Skin is warm.       Quality Measures:     Reviewed items::  EKG reviewed, Labs reviewed, Medications reviewed and Radiology images reviewed  Jenkins catheter::  One or Two Days Post Surgery (Day of Surgery being Day 0) and Critically Ill - Requiring Accurate Measurement of Urinary Output  Central line in place:  Need for access and Vasopressors  DVT prophylaxis pharmacological::  Contraindicated - High bleeding risk  DVT prophylaxis - mechanical:  SCDs  Ulcer Prophylaxis::  Yes      Assessment/Plan:  POD 1 - HOTN- IABP 1:2, epi/jazz, vent- peep inc this am, keep CT/jenkins, CPM  POD 2 HOTN - epi/jazz- IABP 1:2, Vent - pulm following, s/p PEA arrest yesterday- bronch with lots of thick secretions, sedated, CPM  POD 3 - HOTN - epi/jazz- decreased from yesterday, IABP 1:2, Vent - CMV 26, PEEP 10, sedated, diurese well yesterday- start lasix gtt today, CPM  POD 4 NSR, Hotn-epi/jazz, down slightly form yesterday.  IABP 1:2 no change in pressures with pump on standby--change to  1:4.  On lasix gtt, diuresing well.  FiO2 down to 40%, PEEP 10.  Continue lasix gtt.  Will plan on removing IABP today. CPM.       Active Hospital Problems    Diagnosis   • CAD (coronary artery disease) [I25.10]   • CHF (congestive heart failure) (CMS-HCC) [I50.9]

## 2017-12-26 NOTE — PROGRESS NOTES
IABP removed by APN at 1035. Manual pressure applied by this RN for 20 minutes. Site covered by tegaderm and gauze, no signs of bruising, hematoma, area is soft and dry. Orange cable returned to CIC supervisor Jona per perfusionist.

## 2017-12-26 NOTE — CARE PLAN
Problem: Ventilation Defect:  Goal: Ability to achieve and maintain unassisted ventilation or tolerate decreased levels of ventilator support  Adult Ventilation Update    Total Vent Days: 5  26/600/10/40% 9.0@26  Patient Lines/Drains/Airways Status    Active Airway     Name: Placement date: Placement time: Site: Days:    Airway Group ET Tube Oral 9.0 12/22/17   0753   Oral   5              Plateau Pressure (Q Shift): 21 (12/24/17 2325)  Static Compliance (ml / cm H2O): 42.8 (12/25/17 1913)    Patient failed trials because of Barriers to Wean: No Order;FiO2 >60% or PEEP >10 CM H2O (12/25/17 1913)    Sputum/Suction   Cough: Productive (12/26/17 0256)  Sputum Amount: Large (12/26/17 0256)  Sputum Color: Tan (12/26/17 0256)  Sputum Consistency: Thick (12/26/17 0256)    Mobility Group  Activity Performed: Unable to mobilize (12/25/17 2000)  Time Activity Tolerated: 45 min (12/21/17 2030)  Distance Per Occurrence (ft.): 200 feet (12/21/17 2030)  # of Times Distance was Traveled: 2 (12/21/17 2030)  Assistance / Tolerance: No assistance required;Tolerates Well (12/21/17 2030)  Pt Calls for Assistance: No (12/25/17 2000)  Staff Present for Mobilization: RN (12/21/17 2030)  Gait: Steady (12/21/17 2030)  Assistive Devices: None (12/21/17 2030)  Reason Not Mobilized: Unstable condition (12/25/17 2000)  Mobilization Comments: IABP, PEEP 10 (12/25/17 2000)    Events/Summary/Plan: no vent changes at this time.

## 2017-12-26 NOTE — PROGRESS NOTES
Discussed patient status with Monet LOTT. Balloon pump changed to 1:4, patient tolerating well. Plans to remove IABP today. OK to pull [place] Amddy catheter. OK to give Lovenox with chest tubes still in place based on risk versus benefit. See orders.

## 2017-12-26 NOTE — CARE PLAN
Problem: Post Op Day 4 CABG/Heart Valve Replacement  Goal: Optimal care of the Post Op CABG/Heart Valve replacement Post Op Day 4    Intervention: Daily Weights  Completed by NOC RN  Intervention: Shower daily and clean incisions twice daily with soap and water  Patient is intubated and sedated. Incisions cleaned by RN.   Intervention: Up in chair for all meals  N/A, intubated and sedated  Intervention: Ambulate, increasing the distance each time x 3 and before bed  N/A, intubated and sedated  Intervention: IS q 1 hour while awake and record best IS volume  N/A, intubated and sedated  Intervention: Consider removal of jenkins, chest tube and pacer wire if not already done  Patient requires jenkins at this time. Mediastinal tubes and pacing wires to remain in place.  Intervention: Discharge Education  N/A, no family at bedside to discuss plan of care at this time.

## 2017-12-26 NOTE — CARE PLAN
Problem: Ventilation Defect:  Goal: Ability to achieve and maintain unassisted ventilation or tolerate decreased levels of ventilator support  Outcome: PROGRESSING AS EXPECTED  Adult Ventilation Update    Total Vent Days: 4    Patient Lines/Drains/Airways Status    Active Airway     Name: Placement date: Placement time: Site: Days:    Airway Group ET Tube Oral 9.0 12/22/17   0753   Oral   3           Plateau Pressure (Q Shift): 21 (12/24/17 2885)  Static Compliance (ml / cm H2O): 41 (12/25/17 1435)    Patient failed trials because of Barriers to Wean: FiO2 >60% or PEEP >10 CM H2O (12/25/17 0814)  Barriers to SBT    Length of Weaning Trial      Cough: Non Productive (12/25/17 1435)  Sputum Amount: Unable to Evaluate (12/25/17 1435)  Sputum Color: Unable to Evaluate (12/25/17 1435)  Sputum Consistency: Unable to Evaluate (12/25/17 1435)    Mobility Group  Activity Performed: Unable to mobilize (12/24/17 2000)  Time Activity Tolerated: 45 min (12/21/17 2030)  Distance Per Occurrence (ft.): 200 feet (12/21/17 2030)  # of Times Distance was Traveled: 2 (12/21/17 2030)  Assistance / Tolerance: No assistance required;Tolerates Well (12/21/17 2030)  Pt Calls for Assistance: No (12/25/17 0800)  Staff Present for Mobilization: RN (12/21/17 2030)  Gait: Steady (12/21/17 2030)  Assistive Devices: None (12/21/17 2030)  Reason Not Mobilized: Unstable condition;Bed rest (12/24/17 2000)  Mobilization Comments: IABP, PEEP 10 (12/24/17 2000)    Events/Summary/Plan: vent check (12/25/17 1435)

## 2017-12-27 ENCOUNTER — APPOINTMENT (OUTPATIENT)
Dept: RADIOLOGY | Facility: MEDICAL CENTER | Age: 52
DRG: 003 | End: 2017-12-27
Attending: INTERNAL MEDICINE
Payer: MEDICARE

## 2017-12-27 LAB
ACTION RANGE TRIGGERED IACRT: NO
ANION GAP SERPL CALC-SCNC: 6 MMOL/L (ref 0–11.9)
APPEARANCE UR: ABNORMAL
BACTERIA #/AREA URNS HPF: NEGATIVE /HPF
BASE EXCESS BLDA CALC-SCNC: -2 MMOL/L (ref -4–3)
BILIRUB UR QL STRIP.AUTO: NEGATIVE
BODY TEMPERATURE: NORMAL DEGREES
BUN SERPL-MCNC: 31 MG/DL (ref 8–22)
CALCIUM SERPL-MCNC: 7.5 MG/DL (ref 8.5–10.5)
CHLORIDE SERPL-SCNC: 112 MMOL/L (ref 96–112)
CO2 BLDA-SCNC: 23 MMOL/L (ref 20–33)
CO2 SERPL-SCNC: 22 MMOL/L (ref 20–33)
COLOR UR: YELLOW
CREAT SERPL-MCNC: 1.17 MG/DL (ref 0.5–1.4)
EPI CELLS #/AREA URNS HPF: ABNORMAL /HPF
ERYTHROCYTE [DISTWIDTH] IN BLOOD BY AUTOMATED COUNT: 59.6 FL (ref 35.9–50)
GFR SERPL CREATININE-BSD FRML MDRD: >60 ML/MIN/1.73 M 2
GLUCOSE BLD-MCNC: 131 MG/DL (ref 65–99)
GLUCOSE BLD-MCNC: 150 MG/DL (ref 65–99)
GLUCOSE BLD-MCNC: 159 MG/DL (ref 65–99)
GLUCOSE BLD-MCNC: 160 MG/DL (ref 65–99)
GLUCOSE BLD-MCNC: 166 MG/DL (ref 65–99)
GLUCOSE BLD-MCNC: 171 MG/DL (ref 65–99)
GLUCOSE BLD-MCNC: 203 MG/DL (ref 65–99)
GLUCOSE SERPL-MCNC: 140 MG/DL (ref 65–99)
GLUCOSE UR STRIP.AUTO-MCNC: NEGATIVE MG/DL
HCO3 BLDA-SCNC: 21.8 MMOL/L (ref 17–25)
HCT VFR BLD AUTO: 30.1 % (ref 42–52)
HGB BLD-MCNC: 9.2 G/DL (ref 14–18)
HYALINE CASTS #/AREA URNS LPF: ABNORMAL /LPF
INST. QUALIFIED PATIENT IIQPT: YES
KETONES UR STRIP.AUTO-MCNC: NEGATIVE MG/DL
LEUKOCYTE ESTERASE UR QL STRIP.AUTO: ABNORMAL
MCH RBC QN AUTO: 24.5 PG (ref 27–33)
MCHC RBC AUTO-ENTMCNC: 30.6 G/DL (ref 33.7–35.3)
MCV RBC AUTO: 80.1 FL (ref 81.4–97.8)
MICRO URNS: ABNORMAL
NITRITE UR QL STRIP.AUTO: NEGATIVE
O2/TOTAL GAS SETTING VFR VENT: 30 %
PCO2 BLDA: 31.5 MMHG (ref 26–37)
PCO2 TEMP ADJ BLDA: 31.8 MMHG (ref 26–37)
PH BLDA: 7.45 [PH] (ref 7.4–7.5)
PH TEMP ADJ BLDA: 7.45 [PH] (ref 7.4–7.5)
PH UR STRIP.AUTO: 5 [PH]
PLATELET # BLD AUTO: 244 K/UL (ref 164–446)
PMV BLD AUTO: 10.6 FL (ref 9–12.9)
PO2 BLDA: 77 MMHG (ref 64–87)
PO2 TEMP ADJ BLDA: 78 MMHG (ref 64–87)
POTASSIUM SERPL-SCNC: 3.4 MMOL/L (ref 3.6–5.5)
POTASSIUM SERPL-SCNC: 3.6 MMOL/L (ref 3.6–5.5)
POTASSIUM SERPL-SCNC: 3.9 MMOL/L (ref 3.6–5.5)
PROT UR QL STRIP: NEGATIVE MG/DL
RBC # BLD AUTO: 3.76 M/UL (ref 4.7–6.1)
RBC # URNS HPF: ABNORMAL /HPF
RBC UR QL AUTO: ABNORMAL
SAO2 % BLDA: 96 % (ref 93–99)
SODIUM SERPL-SCNC: 140 MMOL/L (ref 135–145)
SP GR UR STRIP.AUTO: 1.01
SPECIMEN DRAWN FROM PATIENT: NORMAL
TRIGL SERPL-MCNC: 110 MG/DL (ref 0–149)
UROBILINOGEN UR STRIP.AUTO-MCNC: 1 MG/DL
WBC # BLD AUTO: 27.2 K/UL (ref 4.8–10.8)
WBC #/AREA URNS HPF: ABNORMAL /HPF

## 2017-12-27 PROCEDURE — 80048 BASIC METABOLIC PNL TOTAL CA: CPT

## 2017-12-27 PROCEDURE — 700101 HCHG RX REV CODE 250: Performed by: CLINICAL NURSE SPECIALIST

## 2017-12-27 PROCEDURE — A9270 NON-COVERED ITEM OR SERVICE: HCPCS | Performed by: INTERNAL MEDICINE

## 2017-12-27 PROCEDURE — 94003 VENT MGMT INPAT SUBQ DAY: CPT

## 2017-12-27 PROCEDURE — 700111 HCHG RX REV CODE 636 W/ 250 OVERRIDE (IP): Performed by: NURSE PRACTITIONER

## 2017-12-27 PROCEDURE — 84478 ASSAY OF TRIGLYCERIDES: CPT

## 2017-12-27 PROCEDURE — A9270 NON-COVERED ITEM OR SERVICE: HCPCS | Performed by: THORACIC SURGERY (CARDIOTHORACIC VASCULAR SURGERY)

## 2017-12-27 PROCEDURE — 700102 HCHG RX REV CODE 250 W/ 637 OVERRIDE(OP): Performed by: INTERNAL MEDICINE

## 2017-12-27 PROCEDURE — 87205 SMEAR GRAM STAIN: CPT

## 2017-12-27 PROCEDURE — 87040 BLOOD CULTURE FOR BACTERIA: CPT

## 2017-12-27 PROCEDURE — 700105 HCHG RX REV CODE 258: Performed by: THORACIC SURGERY (CARDIOTHORACIC VASCULAR SURGERY)

## 2017-12-27 PROCEDURE — 84132 ASSAY OF SERUM POTASSIUM: CPT

## 2017-12-27 PROCEDURE — 700105 HCHG RX REV CODE 258: Performed by: NURSE PRACTITIONER

## 2017-12-27 PROCEDURE — 700111 HCHG RX REV CODE 636 W/ 250 OVERRIDE (IP): Performed by: CLINICAL NURSE SPECIALIST

## 2017-12-27 PROCEDURE — 82962 GLUCOSE BLOOD TEST: CPT | Mod: 91

## 2017-12-27 PROCEDURE — 87150 DNA/RNA AMPLIFIED PROBE: CPT

## 2017-12-27 PROCEDURE — 82803 BLOOD GASES ANY COMBINATION: CPT

## 2017-12-27 PROCEDURE — 302978 HCHG BRONCHOSCOPY-DIAGNOSTIC

## 2017-12-27 PROCEDURE — A9270 NON-COVERED ITEM OR SERVICE: HCPCS | Performed by: CLINICAL NURSE SPECIALIST

## 2017-12-27 PROCEDURE — 71010 DX-CHEST-PORTABLE (1 VIEW): CPT

## 2017-12-27 PROCEDURE — 85027 COMPLETE CBC AUTOMATED: CPT

## 2017-12-27 PROCEDURE — 700102 HCHG RX REV CODE 250 W/ 637 OVERRIDE(OP): Performed by: THORACIC SURGERY (CARDIOTHORACIC VASCULAR SURGERY)

## 2017-12-27 PROCEDURE — 87186 SC STD MICRODIL/AGAR DIL: CPT

## 2017-12-27 PROCEDURE — 87070 CULTURE OTHR SPECIMN AEROBIC: CPT

## 2017-12-27 PROCEDURE — 770022 HCHG ROOM/CARE - ICU (200)

## 2017-12-27 PROCEDURE — 0B9J8ZX DRAINAGE OF LEFT LOWER LUNG LOBE, VIA NATURAL OR ARTIFICIAL OPENING ENDOSCOPIC, DIAGNOSTIC: ICD-10-PCS | Performed by: INTERNAL MEDICINE

## 2017-12-27 PROCEDURE — 0B9F8ZX DRAINAGE OF RIGHT LOWER LUNG LOBE, VIA NATURAL OR ARTIFICIAL OPENING ENDOSCOPIC, DIAGNOSTIC: ICD-10-PCS | Performed by: INTERNAL MEDICINE

## 2017-12-27 PROCEDURE — 81001 URINALYSIS AUTO W/SCOPE: CPT

## 2017-12-27 PROCEDURE — 87077 CULTURE AEROBIC IDENTIFY: CPT | Mod: 91

## 2017-12-27 PROCEDURE — 700111 HCHG RX REV CODE 636 W/ 250 OVERRIDE (IP): Performed by: THORACIC SURGERY (CARDIOTHORACIC VASCULAR SURGERY)

## 2017-12-27 PROCEDURE — 700111 HCHG RX REV CODE 636 W/ 250 OVERRIDE (IP): Performed by: INTERNAL MEDICINE

## 2017-12-27 PROCEDURE — 700102 HCHG RX REV CODE 250 W/ 637 OVERRIDE(OP): Performed by: CLINICAL NURSE SPECIALIST

## 2017-12-27 PROCEDURE — 0BJ08ZZ INSPECTION OF TRACHEOBRONCHIAL TREE, VIA NATURAL OR ARTIFICIAL OPENING ENDOSCOPIC: ICD-10-PCS | Performed by: INTERNAL MEDICINE

## 2017-12-27 RX ORDER — POTASSIUM CHLORIDE 7.45 MG/ML
10 INJECTION INTRAVENOUS ONCE
Status: COMPLETED | OUTPATIENT
Start: 2017-12-27 | End: 2017-12-27

## 2017-12-27 RX ORDER — LINEZOLID 600 MG/1
600 TABLET, FILM COATED ORAL EVERY 12 HOURS
Status: DISCONTINUED | OUTPATIENT
Start: 2017-12-27 | End: 2017-12-29

## 2017-12-27 RX ORDER — POTASSIUM CHLORIDE 14.9 MG/ML
20 INJECTION INTRAVENOUS ONCE
Status: COMPLETED | OUTPATIENT
Start: 2017-12-27 | End: 2017-12-27

## 2017-12-27 RX ADMIN — DEXMEDETOMIDINE HYDROCHLORIDE 1 MCG/KG/HR: 4 INJECTION, SOLUTION INTRAVENOUS at 14:24

## 2017-12-27 RX ADMIN — INSULIN HUMAN 5 UNITS: 100 INJECTION, SUSPENSION SUBCUTANEOUS at 22:17

## 2017-12-27 RX ADMIN — CHLORHEXIDINE GLUCONATE 15 ML: 1.2 RINSE ORAL at 19:44

## 2017-12-27 RX ADMIN — INSULIN HUMAN 5 UNITS: 100 INJECTION, SUSPENSION SUBCUTANEOUS at 13:17

## 2017-12-27 RX ADMIN — DEXMEDETOMIDINE HYDROCHLORIDE 1 MCG/KG/HR: 4 INJECTION, SOLUTION INTRAVENOUS at 06:12

## 2017-12-27 RX ADMIN — ROSUVASTATIN CALCIUM 20 MG: 20 TABLET, FILM COATED ORAL at 19:44

## 2017-12-27 RX ADMIN — FUROSEMIDE 10 MG/HR: 10 INJECTION, SOLUTION INTRAMUSCULAR; INTRAVENOUS at 16:40

## 2017-12-27 RX ADMIN — AMIODARONE HYDROCHLORIDE 0.5 MG/MIN: 50 INJECTION, SOLUTION INTRAVENOUS at 05:28

## 2017-12-27 RX ADMIN — POTASSIUM BICARBONATE 50 MEQ: 25 TABLET, EFFERVESCENT ORAL at 17:00

## 2017-12-27 RX ADMIN — ASPIRIN 81 MG: 81 TABLET, CHEWABLE ORAL at 07:40

## 2017-12-27 RX ADMIN — DEXMEDETOMIDINE HYDROCHLORIDE 1 MCG/KG/HR: 4 INJECTION, SOLUTION INTRAVENOUS at 08:54

## 2017-12-27 RX ADMIN — PROPOFOL 25 MCG/KG/MIN: 10 INJECTION, EMULSION INTRAVENOUS at 17:49

## 2017-12-27 RX ADMIN — POTASSIUM BICARBONATE 50 MEQ: 25 TABLET, EFFERVESCENT ORAL at 07:40

## 2017-12-27 RX ADMIN — LINEZOLID 600 MG: 600 TABLET, FILM COATED ORAL at 13:11

## 2017-12-27 RX ADMIN — DEXMEDETOMIDINE HYDROCHLORIDE 1 MCG/KG/HR: 4 INJECTION, SOLUTION INTRAVENOUS at 03:41

## 2017-12-27 RX ADMIN — POTASSIUM CHLORIDE 20 MEQ: 14.9 INJECTION, SOLUTION INTRAVENOUS at 00:48

## 2017-12-27 RX ADMIN — DEXMEDETOMIDINE HYDROCHLORIDE 1 MCG/KG/HR: 4 INJECTION, SOLUTION INTRAVENOUS at 21:00

## 2017-12-27 RX ADMIN — CHLORHEXIDINE GLUCONATE 15 ML: 1.2 RINSE ORAL at 07:40

## 2017-12-27 RX ADMIN — NYSTATIN 500000 UNITS: 100000 SUSPENSION ORAL at 19:44

## 2017-12-27 RX ADMIN — POTASSIUM CHLORIDE 10 MEQ: 7.46 INJECTION, SOLUTION INTRAVENOUS at 18:35

## 2017-12-27 RX ADMIN — CEFEPIME 2 G: 2 INJECTION, POWDER, FOR SOLUTION INTRAMUSCULAR; INTRAVENOUS at 13:49

## 2017-12-27 RX ADMIN — DEXMEDETOMIDINE HYDROCHLORIDE 1 MCG/KG/HR: 4 INJECTION, SOLUTION INTRAVENOUS at 00:47

## 2017-12-27 RX ADMIN — ENOXAPARIN SODIUM 40 MG: 100 INJECTION SUBCUTANEOUS at 09:00

## 2017-12-27 RX ADMIN — LINEZOLID 600 MG: 600 TABLET, FILM COATED ORAL at 21:00

## 2017-12-27 RX ADMIN — FAMOTIDINE 20 MG: 20 TABLET, FILM COATED ORAL at 07:40

## 2017-12-27 RX ADMIN — POTASSIUM BICARBONATE 50 MEQ: 25 TABLET, EFFERVESCENT ORAL at 23:43

## 2017-12-27 RX ADMIN — CLOPIDOGREL 75 MG: 75 TABLET, FILM COATED ORAL at 07:40

## 2017-12-27 RX ADMIN — FAMOTIDINE 20 MG: 20 TABLET, FILM COATED ORAL at 19:45

## 2017-12-27 RX ADMIN — DEXMEDETOMIDINE HYDROCHLORIDE 1 MCG/KG/HR: 4 INJECTION, SOLUTION INTRAVENOUS at 17:49

## 2017-12-27 RX ADMIN — NYSTATIN 500000 UNITS: 100000 SUSPENSION ORAL at 14:55

## 2017-12-27 RX ADMIN — PROPOFOL 20 MCG/KG/MIN: 10 INJECTION, EMULSION INTRAVENOUS at 23:43

## 2017-12-27 RX ADMIN — POTASSIUM CHLORIDE 20 MEQ: 200 INJECTION, SOLUTION INTRAVENOUS at 13:53

## 2017-12-27 RX ADMIN — CEFEPIME 2 G: 2 INJECTION, POWDER, FOR SOLUTION INTRAMUSCULAR; INTRAVENOUS at 22:14

## 2017-12-27 RX ADMIN — PROPOFOL 30 MCG/KG/MIN: 10 INJECTION, EMULSION INTRAVENOUS at 06:13

## 2017-12-27 RX ADMIN — DEXMEDETOMIDINE HYDROCHLORIDE 1 MCG/KG/HR: 4 INJECTION, SOLUTION INTRAVENOUS at 23:43

## 2017-12-27 RX ADMIN — PROPOFOL 25 MCG/KG/MIN: 10 INJECTION, EMULSION INTRAVENOUS at 10:59

## 2017-12-27 RX ADMIN — POTASSIUM BICARBONATE 50 MEQ: 25 TABLET, EFFERVESCENT ORAL at 11:36

## 2017-12-27 RX ADMIN — STANDARDIZED SENNA CONCENTRATE AND DOCUSATE SODIUM 1 TABLET: 8.6; 5 TABLET, FILM COATED ORAL at 19:45

## 2017-12-27 RX ADMIN — FUROSEMIDE 5 MG/HR: 10 INJECTION, SOLUTION INTRAMUSCULAR; INTRAVENOUS at 00:48

## 2017-12-27 RX ADMIN — PROPOFOL 25 MCG/KG/MIN: 10 INJECTION, EMULSION INTRAVENOUS at 14:24

## 2017-12-27 RX ADMIN — POTASSIUM CHLORIDE 20 MEQ: 200 INJECTION, SOLUTION INTRAVENOUS at 05:52

## 2017-12-27 RX ADMIN — DEXMEDETOMIDINE HYDROCHLORIDE 1 MCG/KG/HR: 4 INJECTION, SOLUTION INTRAVENOUS at 11:40

## 2017-12-27 RX ADMIN — INSULIN HUMAN 5 UNITS: 100 INJECTION, SUSPENSION SUBCUTANEOUS at 05:48

## 2017-12-27 RX ADMIN — EPINEPHRINE 0.01 MCG/KG/MIN: 1 INJECTION, SOLUTION INTRAMUSCULAR; SUBCUTANEOUS at 20:25

## 2017-12-27 RX ADMIN — PROPOFOL 30 MCG/KG/MIN: 10 INJECTION, EMULSION INTRAVENOUS at 02:31

## 2017-12-27 RX ADMIN — NYSTATIN 500000 UNITS: 100000 SUSPENSION ORAL at 07:40

## 2017-12-27 NOTE — THERAPY
as prior, remains on vent POD 4; not medically appropriate for initiation of phase I cardiac rehab; will d/c order for now; please re-order as pt appropriate for PT cardiac eval

## 2017-12-27 NOTE — CARE PLAN
Problem: Post Op Day 4 CABG/Heart Valve Replacement  Goal: Optimal care of the Post Op CABG/Heart Valve replacement Post Op Day 4    Intervention: Daily Weights  Done  Intervention: Shower daily and clean incisions twice daily with soap and water  Bed bath complete  Intervention: Up in chair for all meals  NA, pt intubated  Intervention: Ambulate, increasing the distance each time x 3 and before bed  NA, pt intubated  Intervention: IS q 1 hour while awake and record best IS volume  NA pt intubated  Intervention: Consider removal of jenkins, chest tube and pacer wire if not already done  NA pt intubated

## 2017-12-27 NOTE — PROGRESS NOTES
Dr. Banegas inserted triple lumen catheter into cordis at 1500. Patient tolerated well, no complications noted. CXR performed to confirm location.

## 2017-12-27 NOTE — PROGRESS NOTES
Dr. Banegas notified of white-coated tongue, orders received for Nystatin, see MAR. Wife informed this RN patient has eye drops he uses at home. Discussed with MD and pharmacy, Visine added, see MAR.

## 2017-12-27 NOTE — PROGRESS NOTES
Pulmonary Critical Care Progress Note      DOS:  12/27/2017    Chief Complaint: CAD, 2V CABG    History of Present Illness: 53 y/o M, h/o DM, COPD, DLD, CAD, SHASHANK, Obesity; underwent 2v CABG 12/22    Interval Events:  24 hour interval history reviewed    - SR, no pacing this am   - pressors; epi/jazz 35   - good UOP   - lasix gtt   - Tm 38.4   - Chest film, bilat effusions, edema   - dex, prop, arouses, follows   - + doppler pulses   - vent day 6, 10, 30%; start afternoon    - WBC up to 27.2, febrile, no abx   - IABP out yesterday   - start linazolid, cefepime   - bronch/bal today - moderate thick yellow/green secretions; lavaged RLL   - increase KCL  Yesterday:   - SR, no pacing, 1st AVB   - epi gtt, jazz 40; IABP 4:1   - PAC, not wedging; remove today   - Tm 38.4   - williams TF at goal   - lasix gtt 5/hr   - vent day 5, 10, 40%   - cxr, effusions, edema   - add KCL 50 q 12   - amio gtt   - start lovenox   - prop/fent/dex  Yesterday:   - s/p CABG x 2   - more calm, dex 1.5, propofol low dose propofol   - IABP 2:1, paced, PVCs   - epi, jazz   - lasix gtt started today   - Tm 37.8   - williams TF   - 4.5 L UOP   - vent day 4, 26/10, 40%   - no wean   - Chest film improving bilat infs/edema   - starting K scale      Review of Systems   Unable to perform ROS: Acuity of condition       Physical Exam   Constitutional: He appears well-developed and well-nourished.   HENT:   Head: Normocephalic.   Eyes: Pupils are equal, round, and reactive to light. No scleral icterus.   Neck: No tracheal deviation present.   Cardiovascular: Normal rate.    Pulmonary/Chest:   Diminished, scattered coarse crackles, no wheezes   Abdominal: Soft. He exhibits no distension.   Musculoskeletal:   Trace edema   Neurological: No cranial nerve deficit.   Sedated, intermittent agitation, follows   Skin: Skin is warm and dry.   Psychiatric:   sedate   Nursing note and vitals reviewed.        PFSH:  No change.    Respiratory:  Leo Vent Mode: APVCMV, Rate  (breaths/min): 26, Vt Target (mL): 540, PEEP/CPAP: 10, FiO2: 30, Static Compliance (ml / cm H2O): 57, Control VTE (exp VT): 550  Pulse Oximetry: 98 %  Chest Tube Drains:  Francisco Javier Girard 1: 30 ml  Mediastinal Chest Tube 1: 100 ml      Recent Labs      12/25/17   0041  12/26/17   0518  12/27/17   0824   ISTATAPH  7.488  7.445  7.449   ISTATAPCO2  25.9*  29.1  31.5   ISTATAPO2  65  69  77   ISTATATCO2  20  21  23   TGHUCEY6YTI  94  95  96   ISTATARTHCO3  19.7  20.0  21.8   ISTATARTBE  -2  -3  -2   ISTATTEMP  37.5 C  38.1 C  37.2 C   ISTATFIO2  40  40  30   ISTATSPEC  Arterial  Arterial  Arterial   ISTATAPHTC  7.481  7.429  7.446   VAHDPYGQ8RI  67  74  78       HemoDynamics:  Pulse: (!) 51, Heart Rate (Monitored): (!) 51  Arterial BP: (!) 92/36, NIBP: (!) 89/48  CVP (mm Hg): (!) 9 MM HG            Neuro:  GCS Total Enrique Coma Score: 10         Fluids:  Intake/Output       12/25/17 0700 - 12/26/17 0659 12/26/17 0700 - 12/27/17 0659 12/27/17 0700 - 12/28/17 0659      3120-2215 3122-8422 Total 7225-9608 0408-5460 Total 3876-6316 2149-3164 Total       Intake    I.V.  2093.2  1489.9 3583.1  1940.7  1785.4 3726.2  535.7  -- 535.7    Precedex Volume 624.1 460.8 1084.9 471.6 460.8 932.4 153.6 -- 153.6    Amiodarone Volume 200 396 596 396 204 600 -- -- --    Phenylephrine Volume 158.5 201 359.5 180 177.4 357.4 53.7 -- 53.7    Propofol Volume 276 209.5 485.5 267.3 341.6 608.9 101.2 -- 101.2    Epinephrine Volume 207.6 150.6 358.2 209.9 69.6 279.5 23.2 -- 23.2    IV Piggyback Volume 450 -- 450 100 100 200 100 -- 100    NS  -- 120 220 360 580 80 -- 80    IV Volume (Fentanyl ) 12 12 24 16 12 28 4 -- 4    IV Volume (Lasix) 45 60 105 80 60 140 20 -- 20    Other  --  90 90  --  240 240  --  -- --    Medications (P.O./ Enteral Liquids) -- 90 90 -- 240 240 -- -- --    Enteral  480  950 1430  1350  1080 2430  320  -- 320    Enteral Volume   320 -- 320    Free Water / Tube Flush -- 90 90 390 120 510 -- -- --     Total Intake 2573.2 2529.9 5103.1 3290.7 3105.4 6396.2 855.7 -- 855.7       Output    Urine  1535  2200 3735  1805  3000 4805  825  -- 825    Indwelling Cathether 1535 2200 3735 1805 3000 4805 825 -- 825    Drains  90  165 255  250  160 410  30  -- 30    Mediastinal Chest Tube 1 40 70 110 190 100 290 -- -- --    Francisco Javier Girard 1 50 95 145 60 60 120 30 -- 30    Total Output 1625 2365 3990 2055 3160 5215 855 -- 855       Net I/O     948.2 164.9 1113.1 1235.7 -54.6 1181.2 0.7 -- 0.7        Weight: (!) 159 kg (350 lb 8.5 oz)  Recent Labs      17   1523  17   0450   17   0520  17   1240  17   2205  175   SODIUM   --   140   --   139   --    --   140   POTASSIUM  3.3*  3.3*   < >  3.4*  3.5*  3.6  3.4*   CHLORIDE   --   112   --   112   --    --   112   CO2   --   22   --   21   --    --   22   BUN   --   21   --   27*   --    --   31*   CREATININE   --   0.92   --   1.08   --    --   1.17   MAGNESIUM  2.1   --    --    --    --    --    --    CALCIUM   --   8.0*   --   7.7*   --    --   7.5*    < > = values in this interval not displayed.       GI/Nutrition:  Liver Function  Recent Labs      17   0450  17   0517   PREALBUMIN  4.0*   --    --    GLUCOSE  146*  210*  140*       Heme:  Recent Labs      17   0517   0530   RBC  4.36*  4.33*  3.76*   HEMOGLOBIN  10.4*  10.5*  9.2*   HEMATOCRIT  34.4*  34.3*  30.1*   PLATELETCT  194  237  244       Infectious Disease:  Monitored Temp  Av.6 °C (99.6 °F)  Min: 37.2 °C (99 °F)  Max: 38.3 °C (100.9 °F)  Temp  Av.3 °C (100.9 °F)  Min: 38 °C (100.4 °F)  Max: 38.4 °C (101.1 °F)  Micro: cultures reviewed  Recent Labs      17   0450  17   0520  17   0530   WBC  25.7*  26.8*  27.2*     Current Facility-Administered Medications   Medication Dose Frequency Provider Last Rate Last Dose   • aspirin (ASA) chewable tab 81 mg  81 mg DAILY Kiel Ash M.D.    81 mg at 12/27/17 0740   • potassium bicarbonate (KLYTE) 25 MEQ effervescent tablet TBEF 50 mEq  50 mEq BID Amrik Banegas M.D.   50 mEq at 12/27/17 0740   • tetrahydrozoline (VISINE) 0.05 % ophthalmic solution 1 Drop  1 Drop 4X/DAY PRN Amrik Banegas M.D.       • nystatin (MYCOSTATIN) 316429 UNIT/ML suspension 500,000 Units  5 mL TID Amrik Banegas M.D.   500,000 Units at 12/27/17 0740   • furosemide (LASIX) 100 mg in  mL infusion  5 mg/hr Continuous Radha Martinez A.P.N. 5 mL/hr at 12/27/17 0048 5 mg/hr at 12/27/17 0048   • K+ Scale: Goal of 4.5  1 Each Q6HRS Radha Martinez A.P.N.   1 Each at 12/27/17 0600   • amiodarone (CORDARONE) 450 mg in D5W 250 mL Infusion  0.5 mg/min Continuous Monet Antonio, A.P.N.   Stopped at 12/27/17 0854   • insulin NPH (HUMULIN,NOVOLIN) injection 5 Units  5 Units Q8HRS Silvia Preston A.P.N.   5 Units at 12/27/17 0548   • phenylephrine (KRISTIN-SYNEPHRINE) 80,000 mcg in  mL Infusion  0-50 mcg/min Continuous Radha Martinez A.P.N. 13.1 mL/hr at 12/27/17 0935 35 mcg/min at 12/27/17 0935   • famotidine (PEPCID) tablet 20 mg  20 mg BID Rodolfo Landa M.D.   20 mg at 12/27/17 0740    Or   • famotidine (PEPCID) injection 20 mg  20 mg BID Rodolfo Landa M.D.   20 mg at 12/26/17 2129   • chlorhexidine (PERIDEX) 0.12 % solution 15 mL  15 mL BID Rodolfo Landa M.D.   15 mL at 12/27/17 0740   • fentaNYL (SUBLIMAZE) 50 mcg/mL in 50mL (Continuous Infusion)   Continuous Rodolfo Landa M.D. 1 mL/hr at 12/27/17 0707 50 mcg/hr at 12/27/17 0707   • insulin lispro (HUMALOG) injection 0-15 Units  0-15 Units Q6HRS SUSI Alfred.P.NAlex   2 Units at 12/27/17 0545   • Respiratory Care per Protocol   Continuous RT Silvia Preston A.P.N.       • NS infusion   Continuous SUSI Mendoza.P.N. 10 mL/hr at 12/26/17 0810     • Pharmacy Consult Request ...Pain Management Review 1 Each  1 Each PRN Silvia Preston A.P.N.       • docusate sodium (COLACE) capsule 100 mg  100 mg QAM Silvia  HUI Mohan, A.P.N.   Stopped at 12/22/17 1345    And   • senna-docusate (PERICOLACE or SENOKOT S) 8.6-50 MG per tablet 1 Tab  1 Tab Nightly Silvia HUI Mohan A.P.N.   Stopped at 12/26/17 2100    And   • senna-docusate (PERICOLACE or SENOKOT S) 8.6-50 MG per tablet 1 Tab  1 Tab Q24HRS PRN Silvia Preston A.P.N.        And   • lactulose 20 GM/30ML solution 30 mL  30 mL Q24HRS PRN Silvia Preston, A.P.N.        And   • bisacodyl (DULCOLAX) suppository 10 mg  10 mg Q24HRS PRN Silvia Preston, A.P.N.   10 mg at 12/26/17 2200    And   • fleet enema 133 mL  1 Each Once PRN Silvai Preston A.P.N.       • enoxaparin (LOVENOX) inj 40 mg  40 mg DAILY Silvia Preston, A.P.N.   40 mg at 12/27/17 0900   • clopidogrel (PLAVIX) tablet 75 mg  75 mg DAILY Silvia Preston, A.P.N.   75 mg at 12/27/17 0740   • electrolyte-A (PLASMALYTE-A) infusion   PRN Silvia Preston, A.P.N.       • oxycodone immediate release (ROXICODONE) tablet 5 mg  5 mg Q3HRS PRN Silvia Preston, A.P.N.       • oxycodone immediate release (ROXICODONE) tablet 10 mg  10 mg Q3HRS PRN Silvia Preston, A.P.N.       • morphine (pf) 4 mg/ml injection 4 mg  4 mg Q3HRS PRN Silvia Preston, A.P.N.       • tramadol (ULTRAM) 50 MG tablet 50 mg  50 mg Q4HRS PRN Silvia Preston, A.P.N.       • midazolam (VERSED) 2 MG/2ML injection 0.5-2 mg  0.5-2 mg Q HOUR PRN Silvia Preston A.P.N.   2 mg at 12/24/17 0017   • sodium bicarbonate 8.4 % injection 50 mEq  50 mEq Q HOUR PRN Silvia Preston, A.P.N.       • morphine (pf) 4 mg/ml injection 4 mg  4 mg Q HOUR PRN Silvia Preston, A.P.N.   4 mg at 12/23/17 0239   • ondansetron (ZOFRAN) syringe/vial injection 4 mg  4 mg Q6HRS PRN Silvia Preston, A.P.N.        Or   • prochlorperazine (COMPAZINE) injection 10 mg  10 mg Q6HRS PRN Silvia Preston, A.P.N.        Or   • promethazine (PHENERGAN) suppository 25 mg  25 mg Q6HRS PRN Silvia Preston, A.P.N.       • acetaminophen (TYLENOL) tablet 650 mg  650 mg Q4HRS PRN Silvia Preston, A.P.N.   650 mg at 12/25/17  1910    Or   • acetaminophen (TYLENOL) suppository 650 mg  650 mg Q4HRS PRN Silvia Preston A.P.N.       • mag hydrox-al hydrox-simeth (MAALOX PLUS ES or MYLANTA DS) suspension 30 mL  30 mL Q4HRS PRN Silvia Preston A.P.N.       • diphenhydrAMINE (BENADRYL) tablet/capsule 25 mg  25 mg HS PRN - MR X 1 Silvia Preston A.P.N.       • epinephrine 1 mg/mL(1:1000) 8 mg in  mL Infusion  0-0.2 mcg/kg/min Continuous Kiel Ash M.D. 5.8 mL/hr at 12/26/17 1730 0.01 mcg/kg/min at 12/26/17 1730   • dexmedetomidine (PRECEDEX) 400 mcg/100mL premix infusion  0.1-1.5 mcg/kg/hr Continuous SUSI Mendoza.P.N. 38.4 mL/hr at 12/27/17 0854 1 mcg/kg/hr at 12/27/17 0854   • propofol (DIPRIVAN) injection  0-80 mcg/kg/min Continuous SUSI Mendoza.P.N. 23 mL/hr at 12/27/17 0715 25 mcg/kg/min at 12/27/17 0715   • albuterol inhaler 2 Puff  2 Puff Q4H PRN (RT) Kiel Ash M.D.       • carvedilol (COREG) tablet 6.25 mg  6.25 mg BID WITH MEALS Rayne Mcdowell, A.P.R.N.   Stopped at 12/22/17 1730   • lisinopril (PRINIVIL) tablet 5 mg  5 mg Q DAY Rayne Mcdowell A.P.R.N.   Stopped at 12/22/17 0900   • alprazolam (XANAX) tablet 0.25 mg  0.25 mg Q6HRS PRN Radha Martinez A.P.N.       • rosuvastatin (CRESTOR) tablet 20 mg  20 mg Q EVENING Silvia Preston A.P.N.   20 mg at 12/26/17 2121   • ipratropium-albuterol (DUONEB) nebulizer solution 3 mL  3 mL Q4H PRN (RT) Kiel Ash M.D.   3 mL at 12/23/17 1154     Last reviewed on 12/22/2017  7:07 AM by Katie Aly R.N.    Quality  Measures:  Labs reviewed, Medications reviewed and Radiology images reviewed                      Assessment/Plan:  Status post 2-vessel coronary artery bypass grafting on 12/22/2017.   - IABP Out   - he remains on vasopressor therapy, I titrated today   - chest tubes per CTS   - Lasix drip, monitor volume status and hemodynamics closely   - Postoperative brief cardiac arrest  Acute hypoxic respiratory failure secondary to above.   -  intubated 12/22   - Continue full ventilator support today, adjustments made, not appropriate for weaning/liberation   - SBT, not tolerated, no further wean today  Coronary artery disease with multivessel disease.  ? Pneumonia   - Fever, leukocytosis, secretions noted   - Bronchoscopy today with thick purulent secretions lavaged and a BAL sent for culture   - We'll initiate broad-spectrum antimicrobial coverage with cefepime and linezolid and follow culture results  Cardiomyopathy EF 35% and global systolic dysfunction.  Severely dilated right ventricle  Chronic obstructive pulmonary disease.  Reported diabetes.   - ssi and q4 fsbs  Dyslipidemia.  Clinically with obstructive sleep apnea.  The patient is critically ill. He is at high risk for further vital organ deterioration. I have further titrated ventilator settings today. He is not appropriate for liberation. I titrated vasopressor agents. We'll initiate broad-spectrum antimicrobial therapy and follow up results from bronchoscopy today.      Discussed patient condition and risk of morbidity and/or mortality with Family, RN, RT, Therapies, Pharmacy and [facility].    The patient remains critically ill.  Critical care time = 35 minutes in directly providing and coordinating critical care and extensive data review.  No time overlap and excludes procedures.

## 2017-12-27 NOTE — PROGRESS NOTES
Monitor summary: SR 60, occasional PACs, rare PVCs, .20/.10/.44. Patient has been intermittently atrially paced at 60 today.

## 2017-12-27 NOTE — PROGRESS NOTES
Paused pacer to evaluate patient's intrinsic rhythm. Noted to be bradycardic between 40s-50s. SBP decreases approximately 10 points. Obtained 12-lead EKG. Notified Monet LOTT. Orders received to decrease amiodarone drip to 0.5 mg/min, attempt to wean down the neosynephrine, and to maintain small dose of epinephrine overnight. Patient to be reevaluated in AM by CTS team.

## 2017-12-27 NOTE — PROGRESS NOTES
Arterial BP dampened and not correlating to cuff pressure, Monet LOTT notified, Preference is to get a new art line if line remains unreliable.

## 2017-12-27 NOTE — CARE PLAN
Problem: Ventilation Defect:  Goal: Ability to achieve and maintain unassisted ventilation or tolerate decreased levels of ventilator support    Intervention: Support and monitor invasive and noninvasive mechanical ventilation  Adult Ventilation Update    Total Vent Days: 5    Patient Lines/Drains/Airways Status    Active Airway     Name: Placement date: Placement time: Site: Days:    Airway Group ET Tube Oral 9.0 12/22/17   0753   Oral   4                Cough: Non Productive (12/26/17 1200)  Sputum Amount: Scant (12/26/17 1200)  Sputum Color: White;Tan (12/26/17 1200)  Sputum Consistency: Thick (12/26/17 1200)    Mobility Group  Activity Performed: Unable to mobilize (12/26/17 0800)  Time Activity Tolerated: 45 min (12/21/17 2030)  Distance Per Occurrence (ft.): 200 feet (12/21/17 2030)  # of Times Distance was Traveled: 2 (12/21/17 2030)  Assistance / Tolerance: No assistance required;Tolerates Well (12/21/17 2030)  Pt Calls for Assistance: No (12/26/17 0800)  Staff Present for Mobilization: RN (12/21/17 2030)  Gait: Steady (12/21/17 2030)  Assistive Devices: None (12/21/17 2030)  Reason Not Mobilized: Unstable condition (12/26/17 0800)  Mobilization Comments: IABP (12/26/17 0800)    Events/Summary/Plan: vent check (12/25/17 2557)

## 2017-12-28 ENCOUNTER — APPOINTMENT (OUTPATIENT)
Dept: RADIOLOGY | Facility: MEDICAL CENTER | Age: 52
DRG: 003 | End: 2017-12-28
Attending: NURSE PRACTITIONER
Payer: MEDICARE

## 2017-12-28 ENCOUNTER — APPOINTMENT (OUTPATIENT)
Dept: RADIOLOGY | Facility: MEDICAL CENTER | Age: 52
DRG: 003 | End: 2017-12-28
Attending: INTERNAL MEDICINE
Payer: MEDICARE

## 2017-12-28 LAB
ACTION RANGE TRIGGERED IACRT: NO
ANION GAP SERPL CALC-SCNC: 4 MMOL/L (ref 0–11.9)
APTT PPP: 33.7 SEC (ref 24.7–36)
BASE EXCESS BLDA CALC-SCNC: 3 MMOL/L (ref -4–3)
BODY TEMPERATURE: ABNORMAL DEGREES
BUN SERPL-MCNC: 37 MG/DL (ref 8–22)
CALCIUM SERPL-MCNC: 7.7 MG/DL (ref 8.5–10.5)
CHLORIDE SERPL-SCNC: 108 MMOL/L (ref 96–112)
CO2 BLDA-SCNC: 27 MMOL/L (ref 20–33)
CO2 SERPL-SCNC: 26 MMOL/L (ref 20–33)
CREAT SERPL-MCNC: 1.03 MG/DL (ref 0.5–1.4)
ERYTHROCYTE [DISTWIDTH] IN BLOOD BY AUTOMATED COUNT: 59.4 FL (ref 35.9–50)
GFR SERPL CREATININE-BSD FRML MDRD: >60 ML/MIN/1.73 M 2
GLUCOSE BLD-MCNC: 154 MG/DL (ref 65–99)
GLUCOSE BLD-MCNC: 163 MG/DL (ref 65–99)
GLUCOSE BLD-MCNC: 163 MG/DL (ref 65–99)
GLUCOSE BLD-MCNC: 180 MG/DL (ref 65–99)
GLUCOSE BLD-MCNC: 183 MG/DL (ref 65–99)
GLUCOSE SERPL-MCNC: 156 MG/DL (ref 65–99)
GRAM STN SPEC: NORMAL
HCO3 BLDA-SCNC: 25.9 MMOL/L (ref 17–25)
HCT VFR BLD AUTO: 29.8 % (ref 42–52)
HGB BLD-MCNC: 9.1 G/DL (ref 14–18)
INST. QUALIFIED PATIENT IIQPT: YES
MCH RBC QN AUTO: 24.5 PG (ref 27–33)
MCHC RBC AUTO-ENTMCNC: 30.5 G/DL (ref 33.7–35.3)
MCV RBC AUTO: 80.3 FL (ref 81.4–97.8)
O2/TOTAL GAS SETTING VFR VENT: 30 %
PCO2 BLDA: 31.7 MMHG (ref 26–37)
PCO2 TEMP ADJ BLDA: 32.7 MMHG (ref 26–37)
PH BLDA: 7.52 [PH] (ref 7.4–7.5)
PH TEMP ADJ BLDA: 7.51 [PH] (ref 7.4–7.5)
PLATELET # BLD AUTO: 311 K/UL (ref 164–446)
PMV BLD AUTO: 11.1 FL (ref 9–12.9)
PO2 BLDA: 57 MMHG (ref 64–87)
PO2 TEMP ADJ BLDA: 60 MMHG (ref 64–87)
POTASSIUM SERPL-SCNC: 3.7 MMOL/L (ref 3.6–5.5)
POTASSIUM SERPL-SCNC: 3.8 MMOL/L (ref 3.6–5.5)
POTASSIUM SERPL-SCNC: 3.8 MMOL/L (ref 3.6–5.5)
POTASSIUM SERPL-SCNC: 3.9 MMOL/L (ref 3.6–5.5)
RBC # BLD AUTO: 3.71 M/UL (ref 4.7–6.1)
SAO2 % BLDA: 92 % (ref 93–99)
SIGNIFICANT IND 70042: NORMAL
SITE SITE: NORMAL
SODIUM SERPL-SCNC: 138 MMOL/L (ref 135–145)
SOURCE SOURCE: NORMAL
SPECIMEN DRAWN FROM PATIENT: ABNORMAL
WBC # BLD AUTO: 30.3 K/UL (ref 4.8–10.8)

## 2017-12-28 PROCEDURE — A9270 NON-COVERED ITEM OR SERVICE: HCPCS | Performed by: INTERNAL MEDICINE

## 2017-12-28 PROCEDURE — 700111 HCHG RX REV CODE 636 W/ 250 OVERRIDE (IP): Performed by: INTERNAL MEDICINE

## 2017-12-28 PROCEDURE — 37799 UNLISTED PX VASCULAR SURGERY: CPT

## 2017-12-28 PROCEDURE — 700111 HCHG RX REV CODE 636 W/ 250 OVERRIDE (IP): Performed by: THORACIC SURGERY (CARDIOTHORACIC VASCULAR SURGERY)

## 2017-12-28 PROCEDURE — 700111 HCHG RX REV CODE 636 W/ 250 OVERRIDE (IP): Performed by: CLINICAL NURSE SPECIALIST

## 2017-12-28 PROCEDURE — 82803 BLOOD GASES ANY COMBINATION: CPT

## 2017-12-28 PROCEDURE — 700105 HCHG RX REV CODE 258: Performed by: THORACIC SURGERY (CARDIOTHORACIC VASCULAR SURGERY)

## 2017-12-28 PROCEDURE — 700102 HCHG RX REV CODE 250 W/ 637 OVERRIDE(OP): Performed by: CLINICAL NURSE SPECIALIST

## 2017-12-28 PROCEDURE — 700111 HCHG RX REV CODE 636 W/ 250 OVERRIDE (IP): Performed by: NURSE PRACTITIONER

## 2017-12-28 PROCEDURE — 700101 HCHG RX REV CODE 250: Performed by: CLINICAL NURSE SPECIALIST

## 2017-12-28 PROCEDURE — 71010 DX-CHEST-PORTABLE (1 VIEW): CPT

## 2017-12-28 PROCEDURE — A9270 NON-COVERED ITEM OR SERVICE: HCPCS | Performed by: CLINICAL NURSE SPECIALIST

## 2017-12-28 PROCEDURE — 700102 HCHG RX REV CODE 250 W/ 637 OVERRIDE(OP): Performed by: INTERNAL MEDICINE

## 2017-12-28 PROCEDURE — 82962 GLUCOSE BLOOD TEST: CPT

## 2017-12-28 PROCEDURE — 700105 HCHG RX REV CODE 258: Performed by: NURSE PRACTITIONER

## 2017-12-28 PROCEDURE — 85027 COMPLETE CBC AUTOMATED: CPT

## 2017-12-28 PROCEDURE — 85730 THROMBOPLASTIN TIME PARTIAL: CPT

## 2017-12-28 PROCEDURE — 93971 EXTREMITY STUDY: CPT

## 2017-12-28 PROCEDURE — A9270 NON-COVERED ITEM OR SERVICE: HCPCS | Performed by: THORACIC SURGERY (CARDIOTHORACIC VASCULAR SURGERY)

## 2017-12-28 PROCEDURE — 770022 HCHG ROOM/CARE - ICU (200)

## 2017-12-28 PROCEDURE — 80048 BASIC METABOLIC PNL TOTAL CA: CPT

## 2017-12-28 PROCEDURE — 700102 HCHG RX REV CODE 250 W/ 637 OVERRIDE(OP): Performed by: THORACIC SURGERY (CARDIOTHORACIC VASCULAR SURGERY)

## 2017-12-28 PROCEDURE — 84132 ASSAY OF SERUM POTASSIUM: CPT

## 2017-12-28 PROCEDURE — 94003 VENT MGMT INPAT SUBQ DAY: CPT

## 2017-12-28 RX ORDER — HEPARIN SODIUM 1000 [USP'U]/ML
5000 INJECTION, SOLUTION INTRAVENOUS; SUBCUTANEOUS PRN
Status: DISCONTINUED | OUTPATIENT
Start: 2017-12-28 | End: 2017-12-29

## 2017-12-28 RX ORDER — HEPARIN SODIUM 1000 [USP'U]/ML
9000 INJECTION, SOLUTION INTRAVENOUS; SUBCUTANEOUS ONCE
Status: COMPLETED | OUTPATIENT
Start: 2017-12-28 | End: 2017-12-28

## 2017-12-28 RX ORDER — POTASSIUM CHLORIDE 14.9 MG/ML
20 INJECTION INTRAVENOUS ONCE
Status: COMPLETED | OUTPATIENT
Start: 2017-12-28 | End: 2017-12-28

## 2017-12-28 RX ORDER — POTASSIUM CHLORIDE 7.45 MG/ML
10 INJECTION INTRAVENOUS ONCE
Status: COMPLETED | OUTPATIENT
Start: 2017-12-28 | End: 2017-12-28

## 2017-12-28 RX ADMIN — CEFEPIME 2 G: 2 INJECTION, POWDER, FOR SOLUTION INTRAMUSCULAR; INTRAVENOUS at 22:02

## 2017-12-28 RX ADMIN — POTASSIUM CHLORIDE 10 MEQ: 7.46 INJECTION, SOLUTION INTRAVENOUS at 00:54

## 2017-12-28 RX ADMIN — DEXMEDETOMIDINE HYDROCHLORIDE 1 MCG/KG/HR: 4 INJECTION, SOLUTION INTRAVENOUS at 10:26

## 2017-12-28 RX ADMIN — CHLORHEXIDINE GLUCONATE 15 ML: 1.2 RINSE ORAL at 07:42

## 2017-12-28 RX ADMIN — NYSTATIN 500000 UNITS: 100000 SUSPENSION ORAL at 20:41

## 2017-12-28 RX ADMIN — ASPIRIN 81 MG: 81 TABLET, CHEWABLE ORAL at 09:00

## 2017-12-28 RX ADMIN — PROPOFOL 20 MCG/KG/MIN: 10 INJECTION, EMULSION INTRAVENOUS at 21:26

## 2017-12-28 RX ADMIN — DEXMEDETOMIDINE HYDROCHLORIDE 1 MCG/KG/HR: 4 INJECTION, SOLUTION INTRAVENOUS at 07:53

## 2017-12-28 RX ADMIN — CEFEPIME 2 G: 2 INJECTION, POWDER, FOR SOLUTION INTRAMUSCULAR; INTRAVENOUS at 06:17

## 2017-12-28 RX ADMIN — EPINEPHRINE 0.02 MCG/KG/MIN: 1 INJECTION, SOLUTION INTRAMUSCULAR; SUBCUTANEOUS at 19:07

## 2017-12-28 RX ADMIN — ROSUVASTATIN CALCIUM 20 MG: 20 TABLET, FILM COATED ORAL at 20:41

## 2017-12-28 RX ADMIN — DEXMEDETOMIDINE HYDROCHLORIDE 1 MCG/KG/HR: 4 INJECTION, SOLUTION INTRAVENOUS at 13:50

## 2017-12-28 RX ADMIN — PROPOFOL 20 MCG/KG/MIN: 10 INJECTION, EMULSION INTRAVENOUS at 15:57

## 2017-12-28 RX ADMIN — HEPARIN SODIUM 9000 UNITS: 1000 INJECTION, SOLUTION INTRAVENOUS; SUBCUTANEOUS at 17:18

## 2017-12-28 RX ADMIN — DEXMEDETOMIDINE HYDROCHLORIDE 1 MCG/KG/HR: 4 INJECTION, SOLUTION INTRAVENOUS at 05:10

## 2017-12-28 RX ADMIN — DEXMEDETOMIDINE HYDROCHLORIDE 1 MCG/KG/HR: 4 INJECTION, SOLUTION INTRAVENOUS at 20:20

## 2017-12-28 RX ADMIN — DEXMEDETOMIDINE HYDROCHLORIDE 1 MCG/KG/HR: 4 INJECTION, SOLUTION INTRAVENOUS at 23:00

## 2017-12-28 RX ADMIN — INSULIN HUMAN 5 UNITS: 100 INJECTION, SUSPENSION SUBCUTANEOUS at 06:13

## 2017-12-28 RX ADMIN — DEXMEDETOMIDINE HYDROCHLORIDE 1 MCG/KG/HR: 4 INJECTION, SOLUTION INTRAVENOUS at 02:30

## 2017-12-28 RX ADMIN — CLOPIDOGREL 75 MG: 75 TABLET, FILM COATED ORAL at 07:43

## 2017-12-28 RX ADMIN — HEPARIN SODIUM 1950 UNITS: 5000 INJECTION, SOLUTION INTRAVENOUS at 17:20

## 2017-12-28 RX ADMIN — DEXMEDETOMIDINE HYDROCHLORIDE 1 MCG/KG/HR: 4 INJECTION, SOLUTION INTRAVENOUS at 22:57

## 2017-12-28 RX ADMIN — CEFEPIME 2 G: 2 INJECTION, POWDER, FOR SOLUTION INTRAMUSCULAR; INTRAVENOUS at 13:24

## 2017-12-28 RX ADMIN — STANDARDIZED SENNA CONCENTRATE AND DOCUSATE SODIUM 1 TABLET: 8.6; 5 TABLET, FILM COATED ORAL at 07:32

## 2017-12-28 RX ADMIN — INSULIN HUMAN 5 UNITS: 100 INJECTION, SUSPENSION SUBCUTANEOUS at 22:08

## 2017-12-28 RX ADMIN — DEXMEDETOMIDINE HYDROCHLORIDE 1 MCG/KG/HR: 4 INJECTION, SOLUTION INTRAVENOUS at 17:00

## 2017-12-28 RX ADMIN — FUROSEMIDE 10 MG/HR: 10 INJECTION, SOLUTION INTRAMUSCULAR; INTRAVENOUS at 14:39

## 2017-12-28 RX ADMIN — FAMOTIDINE 20 MG: 20 TABLET, FILM COATED ORAL at 07:43

## 2017-12-28 RX ADMIN — LINEZOLID 600 MG: 600 TABLET, FILM COATED ORAL at 20:41

## 2017-12-28 RX ADMIN — ENOXAPARIN SODIUM 40 MG: 100 INJECTION SUBCUTANEOUS at 07:43

## 2017-12-28 RX ADMIN — POTASSIUM BICARBONATE 50 MEQ: 25 TABLET, EFFERVESCENT ORAL at 06:11

## 2017-12-28 RX ADMIN — POTASSIUM BICARBONATE 50 MEQ: 25 TABLET, EFFERVESCENT ORAL at 17:00

## 2017-12-28 RX ADMIN — POTASSIUM CHLORIDE 20 MEQ: 200 INJECTION, SOLUTION INTRAVENOUS at 06:13

## 2017-12-28 RX ADMIN — POTASSIUM BICARBONATE 50 MEQ: 25 TABLET, EFFERVESCENT ORAL at 11:36

## 2017-12-28 RX ADMIN — FUROSEMIDE 10 MG/HR: 10 INJECTION, SOLUTION INTRAMUSCULAR; INTRAVENOUS at 04:03

## 2017-12-28 RX ADMIN — PROPOFOL 20 MCG/KG/MIN: 10 INJECTION, EMULSION INTRAVENOUS at 05:50

## 2017-12-28 RX ADMIN — CHLORHEXIDINE GLUCONATE 15 ML: 1.2 RINSE ORAL at 20:41

## 2017-12-28 RX ADMIN — NYSTATIN 500000 UNITS: 100000 SUSPENSION ORAL at 14:39

## 2017-12-28 RX ADMIN — PROPOFOL 20 MCG/KG/MIN: 10 INJECTION, EMULSION INTRAVENOUS at 10:26

## 2017-12-28 RX ADMIN — POTASSIUM CHLORIDE 10 MEQ: 7.46 INJECTION, SOLUTION INTRAVENOUS at 13:20

## 2017-12-28 RX ADMIN — LINEZOLID 600 MG: 600 TABLET, FILM COATED ORAL at 09:00

## 2017-12-28 RX ADMIN — FAMOTIDINE 20 MG: 20 TABLET, FILM COATED ORAL at 20:41

## 2017-12-28 RX ADMIN — NYSTATIN 500000 UNITS: 100000 SUSPENSION ORAL at 07:43

## 2017-12-28 RX ADMIN — INSULIN HUMAN 5 UNITS: 100 INJECTION, SUSPENSION SUBCUTANEOUS at 13:23

## 2017-12-28 RX ADMIN — POTASSIUM CHLORIDE 10 MEQ: 7.46 INJECTION, SOLUTION INTRAVENOUS at 18:44

## 2017-12-28 RX ADMIN — STANDARDIZED SENNA CONCENTRATE AND DOCUSATE SODIUM 1 TABLET: 8.6; 5 TABLET, FILM COATED ORAL at 20:41

## 2017-12-28 NOTE — PROCEDURES
"Date of Service: 12/27/2017    Procedure: Diagnostic and therapeutic bronchoscopy with BAL    Indication: Respiratory failure, ? pneumonia    Physician:  Dr. Amrik Banegas MD    Post Procedure Diagnosis:  1. Copious thick yellow/greenish secretions throughout the airways  2. Moderately inflamed airways consistent with tracheobronchitis/pneumonia  3. Right lower lobe and left lower lobe segments therapeutically lavaged with serial small aliquots of saline  4.  BAL right lower lobe sent for culture    Narrative:  Appropriate consent was obtained and \"time out\" was performed.  A flexible fiberoptic bronchoscope was then inserted through the ETT without difficulty.  All airways were evaluated to the sub-segmental level.  The airway mucosa was mild to moderately inflamed consistent with tracheobronchitis. There is a copious amount of thick, purulent, yellow/greenish secretions throughout the airways. The right lower lobe and left lower lobe segments were therapeutically lavaged with small aliquots of saline yielding purulent secretions. No endobronchial lesions were seen.  The bronchoscope was then wedged in a segment of the RLL bronchus. 30 cc of saline was instilled with moderate return of purulent BAL fluid.  The BAL specimen will be sent for appropriate culture.  No immediate complications.  EBL = 0.          "

## 2017-12-28 NOTE — THERAPY
"Pt remains sedated and vented. Not appropriate for OOB activity at this time. Will \"complete\" OT eval at this time. Please re-order OT when indicated.  "

## 2017-12-28 NOTE — CARE PLAN
Problem: Post Op Day 4 CABG/Heart Valve Replacement  Goal: Optimal care of the Post Op CABG/Heart Valve replacement Post Op Day 4    Intervention: Daily Weights  Completed  Intervention: Shower daily and clean incisions twice daily with soap and water  Pt intubated and sedated  Intervention: Up in chair for all meals  Pt intubated and sedated, Receiving tube feeding   Intervention: Ambulate, increasing the distance each time x 3 and before bed  Intubated and sedated  Intervention: IS q 1 hour while awake and record best IS volume  On ventilator  Intervention: Consider removal of jenkins, chest tube and pacer wire if not already done  Still intubated, Turned off pacing this AM

## 2017-12-28 NOTE — PROGRESS NOTES
Radha LOTT at bedside. Orders to Turn off Amio, stop pacing and possible increase of lasix based on UO.

## 2017-12-28 NOTE — CARE PLAN
Problem: Infection  Goal: Will remain free from infection  Outcome: PROGRESSING AS EXPECTED      Problem: Fluid Volume:  Goal: Will maintain balanced intake and output  Outcome: PROGRESSING AS EXPECTED

## 2017-12-28 NOTE — DISCHARGE PLANNING
Remains on vent.  PO day 6.  DTR bedside.  Off Jorge.     Will follow for extubation.  Patient follows.

## 2017-12-28 NOTE — PROGRESS NOTES
Cardiovascular Surgery Progress Note    Name: Joshua Medrano  MRN: [id number]  : 1965  Admit Date: 2017 10:21 AM  Procedure:  Procedure(s) and Anesthesia Type:     * MULTIPLE CORONARY ARTERY BYPASS ENDO VEIN HARVEST X2 - General     * JIM - General  6 Day Post-Op    Vitals:  Patient Vitals for the past 8 hrs:   Temp Monitored Temp SpO2 O2 Delivery O2 (LPM) Pulse Heart Rate (Monitored) Resp NIBP Weight   17 0720 - - 97 % - - - 60 - - -   17 0645 - 37.7 °C (99.9 °F) 98 % - - 61 61 (!) 27 (!) 97/46 -   17 0630 - 37.7 °C (99.9 °F) 98 % - - 63 64 (!) 26 (!) 97/47 -   17 0615 - 37.7 °C (99.9 °F) 98 % - - 62 62 (!) 26 (!) 99/47 -   17 0600 - 37.8 °C (100 °F) 98 % Ventilator 0 61 61 (!) 29 (!) 91/44 -   17 0545 - 37.7 °C (99.9 °F) (!) 79 % - - 66 68 20 - -   17 0530 - 37.7 °C (99.9 °F) 99 % - - 70 70 (!) 26 (!) 98/46 -   17 0515 - 37.7 °C (99.9 °F) 98 % - - 65 72 (!) 27 (!) 95/46 -   17 0500 - 37.7 °C (99.9 °F) 97 % - - 77 71 (!) 26 - -   17 0445 - 37.7 °C (99.9 °F) 98 % - - 70 70 (!) 26 104/49 -   17 0430 - 37.7 °C (99.9 °F) 94 % - - 71 70 (!) 26 101/48 -   17 0415 - 37.8 °C (100 °F) 94 % - - 70 70 (!) 26 103/48 -   17 0400 37.8 °C (100 °F) 37.8 °C (100 °F) 94 % Ventilator 0 69 70 (!) 26 104/50 (!) 156 kg (343 lb 14.7 oz)   17 0345 - 37.8 °C (100 °F) 95 % - - 69 70 (!) 26 102/50 -   17 0330 - 37.9 °C (100.2 °F) 95 % - - 70 70 (!) 26 104/49 -   17 0315 - 37.9 °C (100.2 °F) 96 % - - 70 70 (!) 26 105/50 -   17 0300 - 38 °C (100.4 °F) 95 % - - 69 70 (!) 26 103/47 -   17 0245 - 38 °C (100.4 °F) 96 % - - 69 70 (!) 26 102/48 -   17 0230 - 38.1 °C (100.6 °F) 96 % - - 69 70 (!) 26 104/48 -   17 0215 - 38.2 °C (100.8 °F) 95 % - - 70 70 (!) 26 101/48 -   17 0205 - - 92 % - - - 60 - - -   17 0200 (!) 38.2 °C (100.8 °F) 38.2 °C (100.8 °F) 92 % Ventilator 0 (!) 58 61 (!) 29 102/52 -   17  0145 - 38.1 °C (100.6 °F) (!) 85 % - - (!) 55 60 (!) 22 - -   17 0130 - 38.1 °C (100.6 °F) 94 % - - 63 63 (!) 27 (!) 98/49 -     Temp (24hrs), Av.8 °C (100.1 °F), Min:37.5 °C (99.5 °F), Max:38.2 °C (100.8 °F)      Respiratory:  Leo Vent Mode: APVCMV, Rate (breaths/min): 26, PEEP/CPAP: 8, FiO2: 50, P Peak (PIP): 29, P MEAN: 15 Respiration: (!) 27, Pulse Oximetry: 97 %  Chest Tube Group 1 (A) Mediastinal ANgled 32-Tube Status / Drainage: Patent;Sutured in Place;Small;Serosanguinous, Chest Tube Group 2 (B) Mediastinal Straight 32-Tube Status / Drainage: Patent;Small;Serosanguinous, Chest Tube Group 1 (A) Mediastinal ANgled 32-Device: Dry Closed Drainage System;Suction 20 cm Water, Chest Tube Group 2 (B) Mediastinal Straight 32-Device: Dry Closed Drainage System;Suction 20 cm Water  Chest Tube Drains:  Francisco Javier Girard 1: 20 ml  Mediastinal Chest Tube 1: 80 ml    Fluids:    Intake/Output Summary (Last 24 hours) at 17 0915  Last data filed at 17 0800   Gross per 24 hour   Intake          5266.21 ml   Output             5485 ml   Net          -218.79 ml     Admit weight: Weight: (!) 159.2 kg (350 lb 15.6 oz)  Current weight: Weight: (!) 156 kg (343 lb 14.7 oz) (17 0400)    Labs:  Recent Labs      17   0520  17   0530  17   0428   WBC  26.8*  27.2*  30.3*   RBC  4.33*  3.76*  3.71*   HEMOGLOBIN  10.5*  9.2*  9.1*   HEMATOCRIT  34.3*  30.1*  29.8*   MCV  79.2*  80.1*  80.3*   MCH  24.2*  24.5*  24.5*   MCHC  30.6*  30.6*  30.5*   RDW  59.5*  59.6*  59.4*   PLATELETCT  237  244  311   MPV  10.8  10.6  11.1         Recent Labs      17   0520   17   0455   17   1732  17   2337  17   0428   SODIUM  139   --   140   --    --    --   138   POTASSIUM  3.4*   < >  3.4*   < >  3.9  3.8  3.7   CHLORIDE  112   --   112   --    --    --   108   CO2  21   --   22   --    --    --   26   GLUCOSE  210*   --   140*   --    --    --   156*   BUN  27*   --   31*    --    --    --   37*   CREATININE  1.08   --   1.17   --    --    --   1.03   CALCIUM  7.7*   --   7.5*   --    --    --   7.7*    < > = values in this interval not displayed.           Medications:  • linezolid  600 mg     • cefepime  2 g     • potassium bicarbonate  50 mEq     • aspirin  81 mg     • nystatin  5 mL     • K+ Scale: Goal of 4.5  1 Each     • insulin NPH  5 Units     • famotidine  20 mg     • chlorhexidine  15 mL     • insulin lispro  0-15 Units     • docusate sodium  100 mg      And   • senna-docusate  1 Tab     • enoxaparin  40 mg     • clopidogrel  75 mg     • rosuvastatin  20 mg         Exam:   Review of Systems   Unable to perform ROS: Intubated       Physical Exam   Constitutional: No distress. He is intubated.   Morbidly obese   Neck: Neck supple.   Cardiovascular: Normal rate and regular rhythm.  Exam reveals friction rub. Exam reveals no gallop.    No murmur heard.  Pulmonary/Chest: Effort normal. He is intubated. No respiratory distress. He has decreased breath sounds in the right lower field and the left lower field.   Abdominal: Soft. He exhibits no distension.   Musculoskeletal: He exhibits edema.   Neurological:   sedated   Skin: Skin is warm.   Psychiatric:   JUAREZ       Quality Measures:     Reviewed items::  EKG reviewed, Labs reviewed, Medications reviewed and Radiology images reviewed  Jenkins catheter::  One or Two Days Post Surgery (Day of Surgery being Day 0) and Critically Ill - Requiring Accurate Measurement of Urinary Output  Central line in place:  Need for access and Vasopressors  DVT prophylaxis pharmacological::  Contraindicated - High bleeding risk  DVT prophylaxis - mechanical:  SCDs  Ulcer Prophylaxis::  Yes      Assessment/Plan:  POD 1 - HOTN- IABP 1:2, epi/jazz, vent- peep inc this am, keep CT/jenkins, CPM  POD 2 HOTN - epi/jazz- IABP 1:2, Vent - pulm following, s/p PEA arrest yesterday- bronch with lots of thick secretions, sedated, CPM  POD 3 - HOTN - epi/jazz- decreased  from yesterday, IABP 1:2, Vent - CMV 26, PEEP 10, sedated, diurese well yesterday- start lasix gtt today, CPM  POD 4 NSR, Hotn-epi/jazz, down slightly form yesterday.  IABP 1:2 no change in pressures with pump on standby--change to 1:4.  On lasix gtt, diuresing well.  FiO2 down to 40%, PEEP 10.  Continue lasix gtt.  Will plan on removing IABP today. CPM.   POD 5 HDS, SB- d/c amio, epi/jazz- weaning, Vent- per pulm, sedated when awake follows per RN, lasix gtt- cont, CPM  POD 6 HDS, NSR, epi/jazz--weaning.  On lasix gtt.  WBCs up, bronch yesterday now on ABX.  Mod r effusion.  Will tap.  Keep SERGE drain L thigh one more day.Keep CTs while vented.  CPM.    Patient seen, examined and plan reviewed with midlevel provider. I agree with the plan.      Active Hospital Problems    Diagnosis   • CAD (coronary artery disease) [I25.10]   • CHF (congestive heart failure) (CMS-[facility]) [I50.9]

## 2017-12-28 NOTE — CARE PLAN
Problem: Ventilation Defect:  Goal: Ability to achieve and maintain unassisted ventilation or tolerate decreased levels of ventilator support    Intervention: Support and monitor invasive and noninvasive mechanical ventilation  Adult Ventilation Update    Total Vent Days: 6  CMV: 26/540/+8/30%    Patient Lines/Drains/Airways Status    Active Airway     Name: Placement date:       Airway Group ET Tube Oral 9.0 12/22/17              Plateau Pressure (Q Shift): 36.6   Static Compliance (ml / cm H2O): 37     Sputum/Suction   Cough: Strong;Dry   Sputum Amount: Small   Sputum Color: Clear;White;Tan  Sputum Consistency: Thick     Mobility Group  Activity Performed: Unable to mobilize    Events/Summary/Plan: Bronchoscopy today, PEEP to 8cmH2O from [id number].

## 2017-12-28 NOTE — PROGRESS NOTES
Arti from [facility] called with positive blood culture results of gram +cocci possible staph. PCR negative for MRSA. Dr. Banegas notified. Pt already on appropriate antibiotics.

## 2017-12-28 NOTE — PROGRESS NOTES
Pulmonary Critical Care Progress Note      DOS:  12/27/2017    Chief Complaint: CAD, 2V CABG    History of Present Illness: 51 y/o M, h/o DM, COPD, DLD, CAD, SHASHANK, Obesity; underwent 2v CABG 12/22    Interval Events:  24 hour interval history reviewed    - lasix 10/hr, still +   - tm 38.2   - epi gtt, Jazz   - dex, fentanyl, prop   - arouses, follows   - chest film lg R effusion, edema   - vent day 7, 26, 8, 40%, no SBT   - cefepime/zyvox, Mixed BAL   - replacing K   -    Yesterday     - SR, no pacing this am   - pressors; epi/jazz 35   - good UOP   - lasix gtt   - Tm 38.4   - Chest film, bilat effusions, edema   - dex, prop, arouses, follows   - + doppler pulses   - vent day 6, 10, 30%; start afternoon    - WBC up to 27.2, febrile, no abx   - IABP out yesterday   - start linazolid, cefepime   - bronch/bal today - moderate thick yellow/green secretions; lavaged RLL   - increase KCL  :  Review of Systems   Unable to perform ROS: Acuity of condition       Physical Exam   Constitutional: He appears well-developed and well-nourished.   HENT:   Head: Normocephalic.   Eyes: Pupils are equal, round, and reactive to light. No scleral icterus.   Neck: No tracheal deviation present.   Cardiovascular: Normal rate.    Pulmonary/Chest:   Diminished, scattered coarse crackles, no wheezes   Abdominal: Soft. He exhibits no distension.   Musculoskeletal:   Trace edema   Neurological: No cranial nerve deficit.   Sedated, intermittent agitation, follows   Skin: Skin is warm and dry.   Psychiatric:   sedate   Nursing note and vitals reviewed.        PFSH:  No change.    Respiratory:  Leo Vent Mode: APVCMV, Rate (breaths/min): 26, Vt Target (mL): 540, PEEP/CPAP: 8, FiO2: 40, Static Compliance (ml / cm H2O): 50, Control VTE (exp VT): 545  Pulse Oximetry: 98 %  Chest Tube Drains:  Francisco Javier Girard 1: 20 ml  Mediastinal Chest Tube 1: 80 ml      Recent Labs      12/26/17   0518  12/27/17   0824  12/28/17   0436   ISTATAPH  7.445  7.449   7.520*   ISTATAPCO2  29.1  31.5  31.7   ISTATAPO2  69  77  57*   ISTATATCO2  21  23  27   [id number]  95  96  92*   ISTATARTHCO3  20.0  21.8  25.9*   ISTATARTBE  -3  -2  3   ISTATTEMP  38.1 C  37.2 C  37.7 C   ISTATFIO2  40  30  30   ISTATSPEC  Arterial  Arterial  Arterial   ISTATAPHTC  7.429  7.446  7.510*   [id number]  74  78  60*       HemoDynamics:  Pulse: 60, Heart Rate (Monitored): 61  Arterial BP: (!) 80/34, NIBP: (!) 76/32  CVP (mm Hg): (!) 13 MM HG            Neuro:  GCS Total Enrique Coma Score: 10         Fluids:  Intake/Output       12/26/17 0700 - 12/27/17 0659 12/27/17 0700 - 12/28/17 0659 12/28/17 0700 - 12/29/17 0659      0411-8137 4402-9552 Total 9148-9000 8909-4681 Total 6484-2694 4488-5919 Total       Intake    I.V.  1940.7  1785.4 3726.2  1519.5  1788.6 3308.1  375  -- 375    Precedex Volume 471.6 460.8 932.4 460.8 460.8 921.6 153.6 -- 153.6    Amiodarone Volume 396 204 600 -- 49.3 49.3 -- -- --    Phenylephrine Volume 180 177.4 357.4 134.6 67.3 201.9 18.1 -- 18.1    Propofol Volume 267.3 341.6 608.9 292.6 231.1 523.6 73.6 -- 73.6    Epinephrine Volume 209.9 69.6 279.5 69.6 128.1 197.7 45.7 -- 45.7    IV Piggyback Volume 100 100 200 300 600 900 -- -- --    NS  360 580 160 120 280 40 -- 40    IV Volume (Fentanyl ) 16 12 28 12 12 24 4 -- 4    IV Volume (Lasix) 80 60 140 90 120 210 40 -- 40    Other  --  240 240  90  330 420  90  -- 90    Medications (P.O./ Enteral Liquids) -- 240 240 90 330 420 90 -- 90    Enteral  1350  1080 2430  1110  1080 2190  440  -- 440    Enteral Volume   320 -- 320    Free Water / Tube Flush 390 120 510 150 120 270 120 -- 120    Total Intake 3290.7 3105.4 6396.2 2719.5 3198.6 5918.1 905 -- 905       Output    Urine  1805  3000 4805  2075  3100 5175  1300  -- 1300    Indwelling Cathether 1805 3000 4805 2075 3100 5175 1300 -- 1300    Drains  250  160 410  135  255 390  --  -- --    Mediastinal Chest Tube 1 190 100 290 60 160 220 -- -- --     Francisco Javier Girard 1 60 60 120 75 95 170 -- -- --    Total Output 5 3160 5215 2210 3355 5565 1300 -- 1300       Net I/O     1235.7 -54.6 1181.2 509.5 -156.4 353.1 -395 -- -395        Weight: (!) 156 kg (343 lb 14.7 oz)  Recent Labs      17   0520   17   0455   17   1732  17   2337  17   0428   SODIUM  139   --   140   --    --    --   138   POTASSIUM  3.4*   < >  3.4*   < >  3.9  3.8  3.7   CHLORIDE  112   --   112   --    --    --   108   CO2  21   --   22   --    --    --   26   BUN  27*   --   31*   --    --    --   37*   CREATININE  1.08   --   1.17   --    --    --   1.03   CALCIUM  7.7*   --   7.5*   --    --    --   7.7*    < > = values in this interval not displayed.       GI/Nutrition:  Liver Function  Recent Labs      17   0517   GLUCOSE  210*  140*  156*       Heme:  Recent Labs      17   0517   0517   RBC  4.33*  3.76*  3.71*   HEMOGLOBIN  10.5*  9.2*  9.1*   HEMATOCRIT  34.3*  30.1*  29.8*   PLATELETCT  237  244  311       Infectious Disease:  Monitored Temp  Av.7 °C (99.9 °F)  Min: 37.4 °C (99.3 °F)  Max: 38.2 °C (100.8 °F)  Temp  Av.8 °C (100.1 °F)  Min: 37.5 °C (99.5 °F)  Max: 38.2 °C (100.8 °F)  Micro: cultures reviewed  Recent Labs      17   0517   0517   WBC  26.8*  27.2*  30.3*     Current Facility-Administered Medications   Medication Dose Frequency Provider Last Rate Last Dose   • linezolid (ZYVOX) tablet 600 mg  600 mg Q12HRS Amrik Banegas M.D.   600 mg at 17 0900   • cefepime (MAXIPIME) syringe 2 g  2 g Q8HRS Amrik Banegas M.D.   2 g at 17 0617   • potassium bicarbonate (KLYTE) 25 MEQ effervescent tablet TBEF 50 mEq  50 mEq Q6HRS Amrik Banegas M.D.   50 mEq at 17 0611   • aspirin (ASA) chewable tab 81 mg  81 mg DAILY Kiel Ash M.D.   81 mg at 17 0900   • tetrahydrozoline (VISINE) 0.05 % ophthalmic solution 1 Drop   1 Drop 4X/DAY PRN Amrik Banegas M.D.       • nystatin (MYCOSTATIN) 651770 UNIT/ML suspension 500,000 Units  5 mL TID Amrik Banegas M.D.   500,000 Units at 12/28/17 0743   • furosemide (LASIX) 100 mg in  mL infusion  10 mg/hr Continuous Radha Martinez A.P.N. 10 mL/hr at 12/28/17 0712 10 mg/hr at 12/28/17 0712   • K+ Scale: Goal of 4.5  1 Each Q6HRS Radha Martinez A.P.N.   1 Each at 12/28/17 0600   • amiodarone (CORDARONE) 450 mg in D5W 250 mL Infusion  0.5 mg/min Continuous Monet Antonio A.P.N.   Stopped at 12/27/17 0854   • insulin NPH (HUMULIN,NOVOLIN) injection 5 Units  5 Units Q8HRS Silvia Preston A.P.N.   5 Units at 12/28/17 0613   • phenylephrine (KRISTIN-SYNEPHRINE) 80,000 mcg in  mL Infusion  0-50 mcg/min Continuous Radha Martinez A.P.N.   Stopped at 12/28/17 0940   • famotidine (PEPCID) tablet 20 mg  20 mg BID Rodolfo Landa M.D.   20 mg at 12/28/17 0743   • chlorhexidine (PERIDEX) 0.12 % solution 15 mL  15 mL BID Rodolfo Landa M.D.   15 mL at 12/28/17 0742   • fentaNYL (SUBLIMAZE) 50 mcg/mL in 50mL (Continuous Infusion)   Continuous Rodolfo Landa M.D. 1 mL/hr at 12/27/17 1800 50 mcg/hr at 12/27/17 1800   • insulin lispro (HUMALOG) injection 0-15 Units  0-15 Units Q6HRS Radha Martinez A.P.N.   3 Units at 12/28/17 0613   • Respiratory Care per Protocol   Continuous RT Silvia Preston, A.P.N.       • NS infusion   Continuous Silvia Preston A.P.N. 10 mL/hr at 12/28/17 0712     • Pharmacy Consult Request ...Pain Management Review 1 Each  1 Each PRN SUSI Mendoza.P.N.       • docusate sodium (COLACE) capsule 100 mg  100 mg QAM KAMALA MendozaP.N.   Stopped at 12/22/17 1345    And   • senna-docusate (PERICOLACE or SENOKOT S) 8.6-50 MG per tablet 1 Tab  1 Tab Nightly KAMALA MendozaP.N.   1 Tab at 12/27/17 1945    And   • senna-docusate (PERICOLACE or SENOKOT S) 8.6-50 MG per tablet 1 Tab  1 Tab Q24HRS PRN KAMALA MendozaP.N.   1 Tab at 12/28/17 0732    And   •  lactulose 20 GM/30ML solution 30 mL  30 mL Q24HRS PRN [facility] Mohan, A.P.N.        And   • bisacodyl (DULCOLAX) suppository 10 mg  10 mg Q24HRS PRN [facility] Mohan, A.P.N.   10 mg at 12/26/17 2200    And   • fleet enema 133 mL  1 Each Once PRN [facility] Mohan, A.P.N.       • enoxaparin (LOVENOX) inj 40 mg  40 mg DAILY [facility] Mohan, A.P.N.   40 mg at 12/28/17 0743   • clopidogrel (PLAVIX) tablet 75 mg  75 mg DAILY [facility] Mohan, A.P.N.   75 mg at 12/28/17 0743   • electrolyte-A (PLASMALYTE-A) infusion   PRN [facility] Mohan, A.P.N.       • oxycodone immediate release (ROXICODONE) tablet 5 mg  5 mg Q3HRS PRN Silvia  Mohan, A.P.N.       • oxycodone immediate release (ROXICODONE) tablet 10 mg  10 mg Q3HRS PRN [facility] Mohan, A.P.N.       • morphine (pf) 4 mg/ml injection 4 mg  4 mg Q3HRS PRN [facility] Mohan, A.P.N.       • tramadol (ULTRAM) 50 MG tablet 50 mg  50 mg Q4HRS PRN [facility] Mohan, A.P.N.       • midazolam (VERSED) 2 MG/2ML injection 0.5-2 mg  0.5-2 mg Q HOUR PRN [facility] Mohan, A.P.N.   2 mg at 12/24/17 0017   • sodium bicarbonate 8.4 % injection 50 mEq  50 mEq Q HOUR PRN [facility] Mohan, A.P.N.       • morphine (pf) 4 mg/ml injection 4 mg  4 mg Q HOUR PRN [facility] Mohan, A.P.N.   4 mg at 12/23/17 0239   • ondansetron (ZOFRAN) syringe/vial injection 4 mg  4 mg Q6HRS PRN Silvia  Mohan, A.P.N.        Or   • prochlorperazine (COMPAZINE) injection 10 mg  10 mg Q6HRS PRN Silvia  Mohan, A.P.N.        Or   • promethazine (PHENERGAN) suppository 25 mg  25 mg Q6HRS PRN Silvia Preston A.P.N.       • acetaminophen (TYLENOL) tablet 650 mg  650 mg Q4HRS PRN Silvia Preston A.P.N.   650 mg at 12/25/17 1910    Or   • acetaminophen (TYLENOL) suppository 650 mg  650 mg Q4HRS PRN Silvia Preston A.P.N.       • mag hydrox-al hydrox-simeth (MAALOX PLUS ES or MYLANTA DS) suspension 30 mL  30 mL Q4HRS PRN Silvia Preston, A.P.N.       • diphenhydrAMINE (BENADRYL) tablet/capsule 25 mg  25 mg HS PRN - MR X 1 Silvia Preston A.P.N.        • epinephrine 1 mg/mL(1:1000) 8 mg in  mL Infusion  0-0.2 mcg/kg/min Continuous Kiel Ash M.D. 11.5 mL/hr at 12/28/17 1015 0.02 mcg/kg/min at 12/28/17 1015   • dexmedetomidine (PRECEDEX) 400 mcg/100mL premix infusion  0.1-1.5 mcg/kg/hr Continuous Silvia Preston A.P.N. 38.4 mL/hr at 12/28/17 1026 1 mcg/kg/hr at 12/28/17 1026   • propofol (DIPRIVAN) injection  0-80 mcg/kg/min Continuous Silvia Preston A.P.N. 18.4 mL/hr at 12/28/17 1026 20 mcg/kg/min at 12/28/17 1026   • albuterol inhaler 2 Puff  2 Puff Q4H PRN (RT) Kiel Ash M.D.       • alprazolam (XANAX) tablet 0.25 mg  0.25 mg Q6HRS PRN Radha Martinez, A.P.N.       • rosuvastatin (CRESTOR) tablet 20 mg  20 mg Q EVENING Silvia Preston A.P.N.   20 mg at 12/27/17 1944   • ipratropium-albuterol (DUONEB) nebulizer solution 3 mL  3 mL Q4H PRN (RT) Kiel Ash M.D.   3 mL at 12/23/17 1154     Last reviewed on 12/22/2017  7:07 AM by Katie Aly R.N.    Quality  Measures:  Labs reviewed, Medications reviewed and Radiology images reviewed                      Assessment/Plan:  Status post 2-vessel coronary artery bypass grafting on 12/22/2017.   - IABP Out   - he remains on vasopressor therapy, I titrated today   - chest tubes per CTS   - Lasix drip, monitor volume status and hemodynamics closely   - Postoperative brief cardiac arrest  Acute hypoxic respiratory failure secondary to above.   - intubated 12/22   - Continue full ventilator support today, adjustments made, not appropriate for weaning/liberation   - SBT, not tolerated, no further wean today  Coronary artery disease with multivessel disease.  ? Pneumonia   - Fever, leukocytosis, secretions noted   - Bronchoscopy today with thick purulent secretions lavaged and a BAL sent for culture   - We'll initiate broad-spectrum antimicrobial coverage with cefepime and linezolid and follow culture results  Cardiomyopathy EF 35% and global systolic dysfunction.  Severely dilated right  ventricle  Chronic obstructive pulmonary disease.  Reported diabetes.   - ssi and q4 fsbs  Dyslipidemia.  Clinically with obstructive sleep apnea.  12/28-continue full ventilator support and admitted adjustments today. He is critically ill. Right upper extremity thrombus on ultrasound today starting full dose anticoagulation. Critical care management as above    Discussed patient condition and risk of morbidity and/or mortality with Family, RN, RT, Therapies, Pharmacy and [facility].    The patient remains critically ill.  Critical care time = 33 minutes in directly providing and coordinating critical care and extensive data review.  No time overlap and excludes procedures.

## 2017-12-29 ENCOUNTER — APPOINTMENT (OUTPATIENT)
Dept: RADIOLOGY | Facility: MEDICAL CENTER | Age: 52
DRG: 003 | End: 2017-12-29
Attending: NURSE PRACTITIONER
Payer: MEDICARE

## 2017-12-29 ENCOUNTER — APPOINTMENT (OUTPATIENT)
Dept: RADIOLOGY | Facility: MEDICAL CENTER | Age: 52
DRG: 003 | End: 2017-12-29
Attending: INTERNAL MEDICINE
Payer: MEDICARE

## 2017-12-29 LAB
ACTION RANGE TRIGGERED IACRT: NO
ANION GAP SERPL CALC-SCNC: 9 MMOL/L (ref 0–11.9)
APTT PPP: 33.7 SEC (ref 24.7–36)
APTT PPP: 34.6 SEC (ref 24.7–36)
BACTERIA BLD CULT: ABNORMAL
BACTERIA BLD CULT: ABNORMAL
BACTERIA BRONCH AEROBE CULT: ABNORMAL
BASE EXCESS BLDA CALC-SCNC: 4 MMOL/L (ref -4–3)
BODY TEMPERATURE: ABNORMAL DEGREES
BUN SERPL-MCNC: 43 MG/DL (ref 8–22)
CALCIUM SERPL-MCNC: 8 MG/DL (ref 8.5–10.5)
CHLORIDE SERPL-SCNC: 108 MMOL/L (ref 96–112)
CO2 BLDA-SCNC: 27 MMOL/L (ref 20–33)
CO2 SERPL-SCNC: 26 MMOL/L (ref 20–33)
CREAT SERPL-MCNC: 1.27 MG/DL (ref 0.5–1.4)
EKG IMPRESSION: NORMAL
ERYTHROCYTE [DISTWIDTH] IN BLOOD BY AUTOMATED COUNT: 60.3 FL (ref 35.9–50)
GFR SERPL CREATININE-BSD FRML MDRD: 59 ML/MIN/1.73 M 2
GLUCOSE BLD-MCNC: 155 MG/DL (ref 65–99)
GLUCOSE BLD-MCNC: 164 MG/DL (ref 65–99)
GLUCOSE BLD-MCNC: 181 MG/DL (ref 65–99)
GLUCOSE BLD-MCNC: 184 MG/DL (ref 65–99)
GLUCOSE SERPL-MCNC: 166 MG/DL (ref 65–99)
GRAM STN SPEC: ABNORMAL
HCO3 BLDA-SCNC: 26.5 MMOL/L (ref 17–25)
HCT VFR BLD AUTO: 28.6 % (ref 42–52)
HGB BLD-MCNC: 8.5 G/DL (ref 14–18)
INR PPP: 1.29 (ref 0.87–1.13)
INST. QUALIFIED PATIENT IIQPT: YES
MCH RBC QN AUTO: 23.8 PG (ref 27–33)
MCHC RBC AUTO-ENTMCNC: 29.7 G/DL (ref 33.7–35.3)
MCV RBC AUTO: 80.1 FL (ref 81.4–97.8)
MORPHOLOGY BLD-IMP: NORMAL
O2/TOTAL GAS SETTING VFR VENT: 40 %
PCO2 BLDA: 32.7 MMHG (ref 26–37)
PCO2 TEMP ADJ BLDA: 33.5 MMHG (ref 26–37)
PH BLDA: 7.52 [PH] (ref 7.4–7.5)
PH TEMP ADJ BLDA: 7.51 [PH] (ref 7.4–7.5)
PLATELET # BLD AUTO: 421 K/UL (ref 164–446)
PMV BLD AUTO: 10.8 FL (ref 9–12.9)
PO2 BLDA: 77 MMHG (ref 64–87)
PO2 TEMP ADJ BLDA: 80 MMHG (ref 64–87)
POTASSIUM SERPL-SCNC: 3.8 MMOL/L (ref 3.6–5.5)
POTASSIUM SERPL-SCNC: 4.2 MMOL/L (ref 3.6–5.5)
PROTHROMBIN TIME: 15.8 SEC (ref 12–14.6)
RBC # BLD AUTO: 3.57 M/UL (ref 4.7–6.1)
SAO2 % BLDA: 97 % (ref 93–99)
SIGNIFICANT IND 70042: ABNORMAL
SIGNIFICANT IND 70042: ABNORMAL
SITE SITE: ABNORMAL
SITE SITE: ABNORMAL
SODIUM SERPL-SCNC: 143 MMOL/L (ref 135–145)
SOURCE SOURCE: ABNORMAL
SOURCE SOURCE: ABNORMAL
SPECIMEN DRAWN FROM PATIENT: ABNORMAL
WBC # BLD AUTO: 30.4 K/UL (ref 4.8–10.8)

## 2017-12-29 PROCEDURE — 76604 US EXAM CHEST: CPT

## 2017-12-29 PROCEDURE — 700112 HCHG RX REV CODE 229: Performed by: CLINICAL NURSE SPECIALIST

## 2017-12-29 PROCEDURE — 85027 COMPLETE CBC AUTOMATED: CPT

## 2017-12-29 PROCEDURE — 700111 HCHG RX REV CODE 636 W/ 250 OVERRIDE (IP): Performed by: INTERNAL MEDICINE

## 2017-12-29 PROCEDURE — 700102 HCHG RX REV CODE 250 W/ 637 OVERRIDE(OP): Performed by: NURSE PRACTITIONER

## 2017-12-29 PROCEDURE — 700105 HCHG RX REV CODE 258: Performed by: NURSE PRACTITIONER

## 2017-12-29 PROCEDURE — 700102 HCHG RX REV CODE 250 W/ 637 OVERRIDE(OP): Performed by: CLINICAL NURSE SPECIALIST

## 2017-12-29 PROCEDURE — 36600 WITHDRAWAL OF ARTERIAL BLOOD: CPT

## 2017-12-29 PROCEDURE — 85730 THROMBOPLASTIN TIME PARTIAL: CPT

## 2017-12-29 PROCEDURE — A9270 NON-COVERED ITEM OR SERVICE: HCPCS | Performed by: NURSE PRACTITIONER

## 2017-12-29 PROCEDURE — A9270 NON-COVERED ITEM OR SERVICE: HCPCS | Performed by: CLINICAL NURSE SPECIALIST

## 2017-12-29 PROCEDURE — 700111 HCHG RX REV CODE 636 W/ 250 OVERRIDE (IP): Performed by: NURSE PRACTITIONER

## 2017-12-29 PROCEDURE — 71010 DX-CHEST-PORTABLE (1 VIEW): CPT

## 2017-12-29 PROCEDURE — 82962 GLUCOSE BLOOD TEST: CPT

## 2017-12-29 PROCEDURE — 700102 HCHG RX REV CODE 250 W/ 637 OVERRIDE(OP): Performed by: INTERNAL MEDICINE

## 2017-12-29 PROCEDURE — A9270 NON-COVERED ITEM OR SERVICE: HCPCS | Performed by: INTERNAL MEDICINE

## 2017-12-29 PROCEDURE — 700101 HCHG RX REV CODE 250: Performed by: CLINICAL NURSE SPECIALIST

## 2017-12-29 PROCEDURE — 700105 HCHG RX REV CODE 258: Performed by: INTERNAL MEDICINE

## 2017-12-29 PROCEDURE — A9270 NON-COVERED ITEM OR SERVICE: HCPCS | Performed by: THORACIC SURGERY (CARDIOTHORACIC VASCULAR SURGERY)

## 2017-12-29 PROCEDURE — 700111 HCHG RX REV CODE 636 W/ 250 OVERRIDE (IP): Performed by: THORACIC SURGERY (CARDIOTHORACIC VASCULAR SURGERY)

## 2017-12-29 PROCEDURE — 85610 PROTHROMBIN TIME: CPT

## 2017-12-29 PROCEDURE — 82803 BLOOD GASES ANY COMBINATION: CPT

## 2017-12-29 PROCEDURE — 93005 ELECTROCARDIOGRAM TRACING: CPT | Performed by: THORACIC SURGERY (CARDIOTHORACIC VASCULAR SURGERY)

## 2017-12-29 PROCEDURE — 94003 VENT MGMT INPAT SUBQ DAY: CPT

## 2017-12-29 PROCEDURE — 84132 ASSAY OF SERUM POTASSIUM: CPT

## 2017-12-29 PROCEDURE — 700105 HCHG RX REV CODE 258

## 2017-12-29 PROCEDURE — 93010 ELECTROCARDIOGRAM REPORT: CPT | Performed by: INTERNAL MEDICINE

## 2017-12-29 PROCEDURE — 80048 BASIC METABOLIC PNL TOTAL CA: CPT

## 2017-12-29 PROCEDURE — 700102 HCHG RX REV CODE 250 W/ 637 OVERRIDE(OP): Performed by: THORACIC SURGERY (CARDIOTHORACIC VASCULAR SURGERY)

## 2017-12-29 PROCEDURE — 700111 HCHG RX REV CODE 636 W/ 250 OVERRIDE (IP): Performed by: CLINICAL NURSE SPECIALIST

## 2017-12-29 PROCEDURE — 770022 HCHG ROOM/CARE - ICU (200)

## 2017-12-29 RX ORDER — SODIUM CHLORIDE 9 MG/ML
INJECTION, SOLUTION INTRAVENOUS
Status: COMPLETED
Start: 2017-12-29 | End: 2017-12-29

## 2017-12-29 RX ORDER — POTASSIUM CHLORIDE 7.45 MG/ML
10 INJECTION INTRAVENOUS ONCE
Status: COMPLETED | OUTPATIENT
Start: 2017-12-29 | End: 2017-12-29

## 2017-12-29 RX ORDER — CEFAZOLIN SODIUM 2 G/100ML
2 INJECTION, SOLUTION INTRAVENOUS EVERY 8 HOURS
Status: DISCONTINUED | OUTPATIENT
Start: 2017-12-29 | End: 2018-01-05

## 2017-12-29 RX ORDER — DOCUSATE SODIUM 100 MG/1
100 CAPSULE, LIQUID FILLED ORAL DAILY
Status: DISCONTINUED | OUTPATIENT
Start: 2017-12-29 | End: 2018-01-10 | Stop reason: HOSPADM

## 2017-12-29 RX ORDER — DOCUSATE SODIUM 50 MG/5ML
100 LIQUID ORAL DAILY
Status: DISCONTINUED | OUTPATIENT
Start: 2017-12-29 | End: 2018-01-10 | Stop reason: HOSPADM

## 2017-12-29 RX ADMIN — AMIODARONE HYDROCHLORIDE 1 MG/MIN: 50 INJECTION, SOLUTION INTRAVENOUS at 18:04

## 2017-12-29 RX ADMIN — PROPOFOL 50 MCG/KG/MIN: 10 INJECTION, EMULSION INTRAVENOUS at 22:49

## 2017-12-29 RX ADMIN — POTASSIUM CHLORIDE 10 MEQ: 7.46 INJECTION, SOLUTION INTRAVENOUS at 22:17

## 2017-12-29 RX ADMIN — CEFAZOLIN SODIUM 2 G: 2 INJECTION, SOLUTION INTRAVENOUS at 13:48

## 2017-12-29 RX ADMIN — OXYCODONE HYDROCHLORIDE 10 MG: 10 TABLET ORAL at 12:02

## 2017-12-29 RX ADMIN — NYSTATIN 500000 UNITS: 100000 SUSPENSION ORAL at 07:54

## 2017-12-29 RX ADMIN — POTASSIUM BICARBONATE 50 MEQ: 25 TABLET, EFFERVESCENT ORAL at 05:54

## 2017-12-29 RX ADMIN — FAMOTIDINE 20 MG: 20 TABLET, FILM COATED ORAL at 22:00

## 2017-12-29 RX ADMIN — NYSTATIN 500000 UNITS: 100000 SUSPENSION ORAL at 13:48

## 2017-12-29 RX ADMIN — PROPOFOL 50 MCG/KG/MIN: 10 INJECTION, EMULSION INTRAVENOUS at 20:46

## 2017-12-29 RX ADMIN — PROPOFOL 40 MCG/KG/MIN: 10 INJECTION, EMULSION INTRAVENOUS at 16:09

## 2017-12-29 RX ADMIN — CEFEPIME 2 G: 2 INJECTION, POWDER, FOR SOLUTION INTRAMUSCULAR; INTRAVENOUS at 05:46

## 2017-12-29 RX ADMIN — STANDARDIZED SENNA CONCENTRATE AND DOCUSATE SODIUM 1 TABLET: 8.6; 5 TABLET, FILM COATED ORAL at 22:01

## 2017-12-29 RX ADMIN — POTASSIUM BICARBONATE 50 MEQ: 25 TABLET, EFFERVESCENT ORAL at 00:00

## 2017-12-29 RX ADMIN — AMIODARONE HYDROCHLORIDE 150 MG: 1.5 INJECTION, SOLUTION INTRAVENOUS at 18:03

## 2017-12-29 RX ADMIN — NYSTATIN 500000 UNITS: 100000 SUSPENSION ORAL at 22:00

## 2017-12-29 RX ADMIN — FAMOTIDINE 20 MG: 20 TABLET, FILM COATED ORAL at 07:54

## 2017-12-29 RX ADMIN — HEPARIN SODIUM 5000 UNITS: 1000 INJECTION, SOLUTION INTRAVENOUS; SUBCUTANEOUS at 03:04

## 2017-12-29 RX ADMIN — CHLORHEXIDINE GLUCONATE 15 ML: 1.2 RINSE ORAL at 22:01

## 2017-12-29 RX ADMIN — DEXMEDETOMIDINE HYDROCHLORIDE 1 MCG/KG/HR: 4 INJECTION, SOLUTION INTRAVENOUS at 04:55

## 2017-12-29 RX ADMIN — HEPARIN SODIUM 2350 UNITS/HR: 5000 INJECTION, SOLUTION INTRAVENOUS at 07:36

## 2017-12-29 RX ADMIN — POTASSIUM CHLORIDE 10 MEQ: 7.46 INJECTION, SOLUTION INTRAVENOUS at 01:59

## 2017-12-29 RX ADMIN — DEXMEDETOMIDINE HYDROCHLORIDE 1 MCG/KG/HR: 4 INJECTION, SOLUTION INTRAVENOUS at 07:53

## 2017-12-29 RX ADMIN — ROSUVASTATIN CALCIUM 20 MG: 20 TABLET, FILM COATED ORAL at 22:00

## 2017-12-29 RX ADMIN — PROPOFOL 30 MCG/KG/MIN: 10 INJECTION, EMULSION INTRAVENOUS at 05:22

## 2017-12-29 RX ADMIN — LINEZOLID 600 MG: 600 TABLET, FILM COATED ORAL at 07:54

## 2017-12-29 RX ADMIN — POTASSIUM CHLORIDE 10 MEQ: 7.46 INJECTION, SOLUTION INTRAVENOUS at 13:08

## 2017-12-29 RX ADMIN — POTASSIUM BICARBONATE 50 MEQ: 25 TABLET, EFFERVESCENT ORAL at 12:01

## 2017-12-29 RX ADMIN — DOCUSATE SODIUM 100 MG: 50 LIQUID ORAL at 12:01

## 2017-12-29 RX ADMIN — SODIUM CHLORIDE 500 ML: 9 INJECTION, SOLUTION INTRAVENOUS at 07:59

## 2017-12-29 RX ADMIN — PROPOFOL 50 MCG/KG/MIN: 10 INJECTION, EMULSION INTRAVENOUS at 18:27

## 2017-12-29 RX ADMIN — PROPOFOL 40 MCG/KG/MIN: 10 INJECTION, EMULSION INTRAVENOUS at 13:11

## 2017-12-29 RX ADMIN — FUROSEMIDE 10 MG/HR: 10 INJECTION, SOLUTION INTRAMUSCULAR; INTRAVENOUS at 13:48

## 2017-12-29 RX ADMIN — OXYCODONE HYDROCHLORIDE 10 MG: 10 TABLET ORAL at 18:04

## 2017-12-29 RX ADMIN — POTASSIUM BICARBONATE 50 MEQ: 25 TABLET, EFFERVESCENT ORAL at 18:03

## 2017-12-29 RX ADMIN — POTASSIUM CHLORIDE 10 MEQ: 7.46 INJECTION, SOLUTION INTRAVENOUS at 07:54

## 2017-12-29 RX ADMIN — PROPOFOL 30 MCG/KG/MIN: 10 INJECTION, EMULSION INTRAVENOUS at 01:20

## 2017-12-29 RX ADMIN — PROPOFOL 40 MCG/KG/MIN: 10 INJECTION, EMULSION INTRAVENOUS at 09:45

## 2017-12-29 RX ADMIN — DEXMEDETOMIDINE HYDROCHLORIDE 1 MCG/KG/HR: 4 INJECTION, SOLUTION INTRAVENOUS at 01:50

## 2017-12-29 RX ADMIN — INSULIN HUMAN 5 UNITS: 100 INJECTION, SUSPENSION SUBCUTANEOUS at 05:46

## 2017-12-29 RX ADMIN — Medication 50 MCG: at 09:10

## 2017-12-29 RX ADMIN — INSULIN HUMAN 5 UNITS: 100 INJECTION, SUSPENSION SUBCUTANEOUS at 22:27

## 2017-12-29 RX ADMIN — ASPIRIN 81 MG: 81 TABLET, CHEWABLE ORAL at 07:54

## 2017-12-29 RX ADMIN — CEFAZOLIN SODIUM 2 G: 2 INJECTION, SOLUTION INTRAVENOUS at 22:01

## 2017-12-29 RX ADMIN — CHLORHEXIDINE GLUCONATE 15 ML: 1.2 RINSE ORAL at 07:54

## 2017-12-29 RX ADMIN — ALPRAZOLAM 0.25 MG: 0.25 TABLET ORAL at 12:02

## 2017-12-29 RX ADMIN — INSULIN HUMAN 5 UNITS: 100 INJECTION, SUSPENSION SUBCUTANEOUS at 15:00

## 2017-12-29 NOTE — PROGRESS NOTES
Ultrasound resulted with Thrombus in RUE. Dr Banegas and Monet LOTT notified. Orders placed to start heparan gtt

## 2017-12-29 NOTE — PROGRESS NOTES
Thoracentesis not indicated according to ultrasound tech after imaging at bedside and discussing with radiologist.  Heparin drip discontinued during rounds, pt to start on lovenox.  Precedex also stopped due to fluid intake, propofol increased to keep pt comfortable along with fentanyl due to his c/o pain.  Plan to medicated with oxycodone and xanax as well to decrease need for IV medications in anticipation of extubation when ready.  Family at bedside and updated on plan.

## 2017-12-29 NOTE — PROGRESS NOTES
Pulmonary Critical Care Progress Note      DOS:  12/29/2017    Chief Complaint: CAD, 2V CABG    History of Present Illness: 51 y/o M, h/o DM, COPD, DLD, CAD, SHASHANK, Obesity; underwent 2v CABG 12/22    Interval Events:  24 hour interval history reviewed    - arouses, follows, prop and dex   - epi .03, lasix gtt   - I/O   - heparin gtt held; plan thoracentesis today   - vent day 8, 8, 40%   - chest film bilat eff, atx   - stop dex, decrease volume   - kleb and staph BAL   - OK to change to Ancef, 10 days abx   - kub in am   - follow renal fxn   Yesterday     - lasix 10/hr, still +   - tm 38.2   - epi gtt, Jorge   - dex, fentanyl, prop   - arouses, follows   - chest film lg R effusion, edema   - vent day 7, 26, 8, 40%, no SBT   - cefepime/zyvox, Mixed BAL   - replacing K  :  Review of Systems   Unable to perform ROS: Acuity of condition       Physical Exam   Constitutional: He appears well-developed and well-nourished.   HENT:   Head: Normocephalic.   Eyes: Pupils are equal, round, and reactive to light. No scleral icterus.   Neck: No tracheal deviation present.   Cardiovascular: Normal rate.    Pulmonary/Chest:   Diminished, scattered coarse crackles, no wheezes   Abdominal: Soft. He exhibits no distension.   Musculoskeletal:   Trace edema   Neurological: No cranial nerve deficit.   Sedated, intermittent agitation, follows   Skin: Skin is warm and dry.   Psychiatric:   sedate   Nursing note and vitals reviewed.        PFSH:  No change.    Respiratory:  Leo Vent Mode: APVCMV, Rate (breaths/min): 26, Vt Target (mL): 540, PEEP/CPAP: 8, FiO2: 40, Static Compliance (ml / cm H2O): 56, Control VTE (exp VT): 533  Pulse Oximetry: 97 %  Chest Tube Drains:  Francisco Javier Girard 1: 20 ml  Mediastinal Chest Tube 1: 100 ml      Recent Labs      12/27/17   0824  12/28/17   0436  12/29/17   0750   ISTATAPH  7.449  7.520*  7.516*   ISTATAPCO2  31.5  31.7  32.7   ISTATAPO2  77  57*  77   ISTATATCO2  23  27  27   [id number]  96  92*  97    ISTATARTHCO3  21.8  25.9*  26.5*   ISTATARTBE  -2  3  4*   ISTATTEMP  37.2 C  37.7 C  37.5 C   ISTATFIO2  30  30  40   ISTATSPEC  Arterial  Arterial  Arterial   ISTATAPHTC  7.446  7.510*  7.508*   [id number]  78  60*  80       HemoDynamics:  Pulse: 66, Heart Rate (Monitored): 66  Arterial BP: (!) 78/65, NIBP: (!) 98/49  CVP (mm Hg): (!) 12 MM HG            Neuro:  GCS Total Enrique Coma Score: 5         Fluids:  Intake/Output       12/27/17 0700 - 12/28/17 0659 12/28/17 0700 - 12/29/17 0659 12/29/17 0700 - 12/30/17 0659      2616-3441 1894-0387 Total 6051-9821 7805-1170 Total 5680-2665 1255-6962 Total       Intake    I.V.  1519.5  1788.6 3308.1  994.9  1923.9 2918.8  --  -- --    Precedex Volume 460.8 460.8 921.6 384 537.6 921.6 -- -- --    Amiodarone Volume -- 49.3 49.3 -- -- -- -- -- --    Phenylephrine Volume 134.6 67.3 201.9 18.1 -- 18.1 -- -- --    Propofol Volume 292.6 231.1 523.6 184 336.4 520.4 -- -- --    Epinephrine Volume 69.6 128.1 197.7 98.8 197.9 296.7 -- -- --    IV Piggyback Volume 300 600 900 100 600 700 -- -- --    NS  120 280 100 120 220 -- -- --    IV Volume (Fentanyl ) 12 12 24 10 12 22 -- -- --    IV Volume (Lasix) 90 120 210 100 120 220 -- -- --    Other  90  330 420  90  300 390  --  -- --    Medications (P.O./ Enteral Liquids) 90 330 420 90 300 390 -- -- --    Enteral  1110  1080 2190  920  1050 1970  --  -- --    Enteral Volume   -- -- --    Free Water / Tube Flush 150 120 270 120 90 210 -- -- --    Total Intake 2719.5 3198.6 5918.1 2004.9 3273.9 5278.8 -- -- --       Output    Urine  2075  3100 5175  2600  3720 6320  --  -- --    Indwelling Cathether 2075 3100 5175 2600 3720 6320 -- -- --    Drains  135  255 390  --  220 220  --  -- --    Mediastinal Chest Tube 1 60 160 220 -- 100 100 -- -- --    Francisco Javier Girard 1 75 95 170 -- 120 120 -- -- --    Total Output 2210 3355 5565 2600 3940 6540 -- -- --       Net I/O     509.5 -156.4 353.1 -595.1 -666.1 -1261.2 --  -- --        Weight: (!) 159 kg (350 lb 8.5 oz)  Recent Labs      173  17   0537   SODIUM  140   --   138   --    --    --   143   POTASSIUM  3.4*   < >  3.7   < >  3.9  4.2  3.8   CHLORIDE  112   --   108   --    --    --   108   CO2  22   --   26   --    --    --   26   BUN  31*   --   37*   --    --    --   43*   CREATININE  1.17   --   1.03   --    --    --   1.27   CALCIUM  7.5*   --   7.7*   --    --    --   8.0*    < > = values in this interval not displayed.       GI/Nutrition:  Liver Function  Recent Labs      17   GLUCOSE  140*  156*  166*       Heme:  Recent Labs      17   0148  17   0537  17   0845   RBC  3.76*  3.71*   --    --    --   3.57*   --    HEMOGLOBIN  9.2*  9.1*   --    --    --   8.5*   --    HEMATOCRIT  30.1*  29.8*   --    --    --   28.6*   --    PLATELETCT  244  311   --    --    --   421   --    PROTHROMBTM   --    --    --    --    --    --   15.8*   APTT   --    --   33.7  33.7  34.6   --    --    INR   --    --    --    --    --    --   1.29*       Infectious Disease:  Monitored Temp  Av.6 °C (99.6 °F)  Min: 37.4 °C (99.3 °F)  Max: 37.9 °C (100.2 °F)  Temp  Av.6 °C (99.6 °F)  Min: 37.4 °C (99.3 °F)  Max: 37.8 °C (100 °F)  Micro: cultures reviewed  Recent Labs      1737   WBC  27.2*  30.3*  30.4*     Current Facility-Administered Medications   Medication Dose Frequency Provider Last Rate Last Dose   • docusate sodium 100mg/10mL (COLACE) solution 100 mg  100 mg DAILY Silvia Preston, A.P.N.        Or   • docusate sodium (COLACE) capsule 100 mg  100 mg DAILY Silvia Preston, A.P.N.       • heparin injection 5,000 Units  5,000 Units PRN Monet Antonio A.P.N.   5,000 Units at 17 0304    And   • heparin infusion 25,000 units in  500 ml 0.45% nacl   Continuous Monet Antonio A.P.N. 47 mL/hr at 12/29/17 0736 2,350 Units/hr at 12/29/17 0736   • linezolid (ZYVOX) tablet 600 mg  600 mg Q12HRS Amrik Banegas M.D.   600 mg at 12/29/17 0754   • cefepime (MAXIPIME) syringe 2 g  2 g Q8HRS Amrik Banegas M.D.   2 g at 12/29/17 0546   • potassium bicarbonate (KLYTE) 25 MEQ effervescent tablet TBEF 50 mEq  50 mEq Q6HRS Amrik Banegas M.D.   50 mEq at 12/29/17 0554   • aspirin (ASA) chewable tab 81 mg  81 mg DAILY Kiel Ash M.D.   81 mg at 12/29/17 0754   • tetrahydrozoline (VISINE) 0.05 % ophthalmic solution 1 Drop  1 Drop 4X/DAY PRN Amrik Banegas M.D.       • nystatin (MYCOSTATIN) 140493 UNIT/ML suspension 500,000 Units  5 mL TID Amrik Banegas M.D.   500,000 Units at 12/29/17 0754   • furosemide (LASIX) 100 mg in  mL infusion  10 mg/hr Continuous Radha Martinez A.P.N. 10 mL/hr at 12/28/17 1439 10 mg/hr at 12/28/17 1439   • K+ Scale: Goal of 4.5  1 Each Q6HRS Radha Martinez A.P.N.   1 Each at 12/29/17 0600   • insulin NPH (HUMULIN,NOVOLIN) injection 5 Units  5 Units Q8HRS Silvia Preston A.P.N.   5 Units at 12/29/17 0546   • phenylephrine (KRISTIN-SYNEPHRINE) 80,000 mcg in  mL Infusion  0-50 mcg/min Continuous Radha Martinez A.P.N.   Stopped at 12/28/17 0940   • famotidine (PEPCID) tablet 20 mg  20 mg BID Rodolfo Landa M.D.   20 mg at 12/29/17 0754   • chlorhexidine (PERIDEX) 0.12 % solution 15 mL  15 mL BID Rodolfo Landa M.D.   15 mL at 12/29/17 0754   • fentaNYL (SUBLIMAZE) 50 mcg/mL in 50mL (Continuous Infusion)   Continuous Rodolfo Landa M.D. 1 mL/hr at 12/28/17 1912 50 mcg at 12/29/17 0910   • insulin lispro (HUMALOG) injection 0-15 Units  0-15 Units Q6HRS Radha Martinez, A.P.N.   3 Units at 12/29/17 0546   • Respiratory Care per Protocol   Continuous RT Silvia Preston A.P.N.       • NS infusion   Continuous Silvia Preston A.P.N. 10 mL/hr at 12/28/17 0712     • Pharmacy Consult Request ...Pain Management  Review 1 Each  1 Each PRN Silvia Preston A.P.N.       • senna-docusate (PERICOLACE or SENOKOT S) 8.6-50 MG per tablet 1 Tab  1 Tab Nightly Silvia Preston A.P.N.   1 Tab at 12/28/17 2041    And   • senna-docusate (PERICOLACE or SENOKOT S) 8.6-50 MG per tablet 1 Tab  1 Tab Q24HRS PRN Silvia Prestno A.P.N.   1 Tab at 12/28/17 0732    And   • lactulose 20 GM/30ML solution 30 mL  30 mL Q24HRS PRN Silvia Preston A.P.N.        And   • bisacodyl (DULCOLAX) suppository 10 mg  10 mg Q24HRS PRN Silvia Preston A.P.N.   10 mg at 12/26/17 2200    And   • fleet enema 133 mL  1 Each Once PRN Sivlia Preston A.P.N.       • electrolyte-A (PLASMALYTE-A) infusion   PRN Silvia Preston, A.P.N.       • oxycodone immediate release (ROXICODONE) tablet 5 mg  5 mg Q3HRS PRN Silvia Preston, A.P.N.       • oxycodone immediate release (ROXICODONE) tablet 10 mg  10 mg Q3HRS PRN Silvia Preston, A.P.N.       • morphine (pf) 4 mg/ml injection 4 mg  4 mg Q3HRS PRN Silvia Preston A.P.N.       • tramadol (ULTRAM) 50 MG tablet 50 mg  50 mg Q4HRS PRN Silvia Preston, A.P.N.       • midazolam (VERSED) 2 MG/2ML injection 0.5-2 mg  0.5-2 mg Q HOUR PRN Silvia Preston A.P.N.   2 mg at 12/24/17 0017   • sodium bicarbonate 8.4 % injection 50 mEq  50 mEq Q HOUR PRN Silvia Preston, A.P.N.       • morphine (pf) 4 mg/ml injection 4 mg  4 mg Q HOUR PRN Silvia Preston A.P.N.   4 mg at 12/23/17 0239   • ondansetron (ZOFRAN) syringe/vial injection 4 mg  4 mg Q6HRS PRN Silvia Preston, A.P.N.        Or   • prochlorperazine (COMPAZINE) injection 10 mg  10 mg Q6HRS PRN Silvia Preston, A.P.N.        Or   • promethazine (PHENERGAN) suppository 25 mg  25 mg Q6HRS PRN Silvia Preston, A.P.N.       • acetaminophen (TYLENOL) tablet 650 mg  650 mg Q4HRS PRN Sivlia Preston, A.P.N.   650 mg at 12/25/17 1910    Or   • acetaminophen (TYLENOL) suppository 650 mg  650 mg Q4HRS PRN Silvia Preston, A.P.N.       • mag hydrox-al hydrox-simeth (MAALOX PLUS ES or MYLANTA DS) suspension  30 mL  30 mL Q4HRS PRN Silvia Preston A.P.N.       • diphenhydrAMINE (BENADRYL) tablet/capsule 25 mg  25 mg HS PRN - MR X 1 Silvia Preston A.P.N.       • epinephrine 1 mg/mL(1:1000) 8 mg in  mL Infusion  0-0.2 mcg/kg/min Continuous Kiel Ash M.D. 17.3 mL/hr at 12/28/17 2250 0.03 mcg/kg/min at 12/28/17 2250   • dexmedetomidine (PRECEDEX) 400 mcg/100mL premix infusion  0.1-1.5 mcg/kg/hr Continuous SUSI Mendoza.P.N. 38.4 mL/hr at 12/29/17 0753 1 mcg/kg/hr at 12/29/17 0753   • propofol (DIPRIVAN) injection  0-80 mcg/kg/min Continuous Silvia Preston A.P.N. 27.6 mL/hr at 12/29/17 0522 30 mcg/kg/min at 12/29/17 0522   • albuterol inhaler 2 Puff  2 Puff Q4H PRN (RT) Kiel Ash M.D.       • alprazolam (XANAX) tablet 0.25 mg  0.25 mg Q6HRS PRN Radha Martinez A.P.N.       • rosuvastatin (CRESTOR) tablet 20 mg  20 mg Q EVENING Silvia Preston A.P.N.   20 mg at 12/28/17 2041   • ipratropium-albuterol (DUONEB) nebulizer solution 3 mL  3 mL Q4H PRN (RT) Kiel Ash M.D.   3 mL at 12/23/17 1154     Last reviewed on 12/22/2017  7:07 AM by Katie Aly R.N.    Quality  Measures:  Labs reviewed, Medications reviewed and Radiology images reviewed                      Assessment/Plan:  Status post 2-vessel coronary artery bypass grafting on 12/22/2017.   - IABP Out   - he remains on vasopressor therapy, I titrated today   - chest tubes per CTS   - Lasix drip, monitor volume status and hemodynamics closely   - Postoperative brief cardiac arrest  Acute hypoxic respiratory failure secondary to above.   - intubated 12/22   - Continue full ventilator support today, adjustments made, not appropriate for weaning/liberation   - SBT, not tolerated, no further wean today  Coronary artery disease with multivessel disease.  ? Pneumonia   - Fever, leukocytosis, secretions noted   - Bronchoscopy today with thick purulent secretions lavaged and a BAL sent for culture   - We'll initiate broad-spectrum  antimicrobial coverage with cefepime and linezolid and follow culture results  Cardiomyopathy EF 35% and global systolic dysfunction.  Severely dilated right ventricle  Chronic obstructive pulmonary disease.  Reported diabetes.   - ssi and q4 fsbs  Dyslipidemia.  Clinically with obstructive sleep apnea.  12/29 - the patient remains critically ill. I've adjusted ventilator settings again today. He remains on higher support. He developed rapid atrial fibrillation later in the day. I have re-bolused amiodarone and we'll institute the infusion. I titrated vasopressor agents    Discussed patient condition and risk of morbidity and/or mortality with Family, RN, RT, Therapies, Pharmacy and [facility].    The patient remains critically ill.  Critical care time = 36 minutes in directly providing and coordinating critical care and extensive data review.  No time overlap and excludes procedures.

## 2017-12-29 NOTE — PROGRESS NOTES
US RUE with cephalic vein thrombus.  Start heparin.  Will need to plan to  hold for eventual DC SERGE/CTs and bridge to coumadin or other OAC.

## 2017-12-29 NOTE — CARE PLAN
Problem: Ventilation Defect:  Goal: Ability to achieve and maintain unassisted ventilation or tolerate decreased levels of ventilator support    Intervention: Support and monitor invasive and noninvasive mechanical ventilation  Adult Ventilation Update    Total Vent Days: 7    Patient Lines/Drains/Airways Status    Active Airway     Name: Placement date:       Airway Group ET Tube Oral 9.0 12/22/17                   Plateau Pressure (Q Shift): 20.2   Static Compliance (ml / cm H2O): 45    Patient failed trials because of Barriers to Wean: Other (Comments) (hypotensive)     Sputum/Suction  Cough: Productive;Strong   Sputum Amount: Small  Sputum Color: Clear;Tan;White   Sputum Consistency: Thick;Thin    Mobility Group  Activity Performed: Unable to mobilize     Events/Summary/Plan: Fi02 down to 40% (12/28/17 0937)    Pt hypotensive and unable to wean at this time.

## 2017-12-29 NOTE — DISCHARGE PLANNING
AM rounds    No over night events. Remains on vent, day 8. Family at bedside.  Not mobilizing. No more agitation.    Sw following for d/c plan.

## 2017-12-29 NOTE — PROGRESS NOTES
Monitor Summary: Normal Sinus 60s with a 1st degree HB and occasional PVCs and rare PACs 0.22/0.1/0.42      12 hour chart check

## 2017-12-29 NOTE — HEART FAILURE PROGRAM
Cardiovascular Nurse Navigator () Progress Note:    Pt currently still on vent. Has the following appt for HF f/u:    Your appointments     Palomo 10, 2018  2:00 PM PST  Heart Failure New with Jerry Reyes M.D.  [facility] (--) [address], [address]  [place] [id number]  [phone]     Pt must have a f/u appt within seven calendar days of discharge unless he goes to SNF or home on hospice.    Thank you and please call with questions, Taylor

## 2017-12-29 NOTE — PROGRESS NOTES
Cardiovascular Surgery Progress Note    Name: Joshua Medrano  MRN: [id number]  : 1965  Admit Date: 2017 10:21 AM  Procedure:  Procedure(s) and Anesthesia Type:     * MULTIPLE CORONARY ARTERY BYPASS ENDO VEIN HARVEST X2 - General     * JIM - General  7 Day Post-Op    Vitals:  Patient Vitals for the past 8 hrs:   Temp Monitored Temp SpO2 O2 Delivery O2 (LPM) Pulse Heart Rate (Monitored) Resp NIBP   17 0749 - - 97 % - - - 66 - -   17 0715 - 37.6 °C (99.7 °F) - - - 66 66 (!) 26 -   17 0700 - 37.5 °C (99.5 °F) - - - 66 66 (!) 26 (!) 98/49   17 0645 - 37.5 °C (99.5 °F) - - - 66 66 (!) 26 (!) 96/17 0630 - 37.6 °C (99.7 °F) 98 % - - 66 66 (!) 26 (!) 93/46   17 0615 - 37.6 °C (99.7 °F) 97 % - - 65 65 - (!) 91/43   17 0600 37.6 °C (99.7 °F) 37.6 °C (99.7 °F) 98 % Ventilator 0 70 73 (!) 26 (!) 95/46   17 0545 - 37.7 °C (99.9 °F) 98 % - - 65 71 (!) 26 (!) 96/17 0530 - 37.8 °C (100 °F) 98 % - - 69 73 (!) 26 (!) 92/45   17 0515 - 37.9 °C (100.2 °F) 100 % - - 70 71 (!) 30 -   17 0500 - 37.6 °C (99.7 °F) 98 % - - 67 68 (!) 26 -   17 0445 - 37.6 °C (99.7 °F) 96 % - - 65 67 (!) 26 (!) 96/47   17 0430 - 37.6 °C (99.7 °F) 97 % - - 62 71 (!) 26 (!) 95/41     Temp (24hrs), Av.6 °C (99.6 °F), Min:37.4 °C (99.3 °F), Max:37.8 °C (100 °F)      Respiratory:  Leo Vent Mode: APVCMV, Rate (breaths/min): 26, PEEP/CPAP: 8, FiO2: 40, P Peak (PIP): 24, P MEAN: 13 Respiration: (!) 26, Pulse Oximetry: 97 %  Chest Tube Group 1 (A) Mediastinal ANgled 32-Tube Status / Drainage: Patent;Sutured in Place;Small;Serosanguinous, Chest Tube Group 2 (B) Mediastinal Straight 32-Tube Status / Drainage: Patent;Small;Serosanguinous, Chest Tube Group 1 (A) Mediastinal ANgled 32-Device: Dry Closed Drainage System;Suction 20 cm Water, Chest Tube Group 2 (B) Mediastinal Straight 32-Device: Dry Closed Drainage System;Suction 20 cm Water  Chest Tube  Drains:  Francisco Javier Girard 1: 20 ml  Mediastinal Chest Tube 1: 100 ml    Fluids:    Intake/Output Summary (Last 24 hours) at 12/29/17 1229  Last data filed at 12/29/17 0600   Gross per 24 hour   Intake          4241.72 ml   Output             5290 ml   Net         -1048.28 ml     Admit weight: Weight: (!) 159.2 kg (350 lb 15.6 oz)  Current weight: Weight: (!) 159 kg (350 lb 8.5 oz) (12/29/17 0400)    Labs:  Recent Labs      12/27/17   0530  12/28/17   0428  12/29/17   0537   WBC  27.2*  30.3*  30.4*   RBC  3.76*  3.71*  3.57*   HEMOGLOBIN  9.2*  9.1*  8.5*   HEMATOCRIT  30.1*  29.8*  28.6*   MCV  80.1*  80.3*  80.1*   MCH  24.5*  24.5*  23.8*   MCHC  30.6*  30.5*  29.7*   RDW  59.6*  59.4*  60.3*   PLATELETCT  244  311  421   MPV  10.6  11.1  10.8         Recent Labs      12/27/17   0455   12/28/17   0428   12/29/17   0033  12/29/17   0537  12/29/17   1155   SODIUM  140   --   138   --    --   143   --    POTASSIUM  3.4*   < >  3.7   < >  4.2  3.8  3.8   CHLORIDE  112   --   108   --    --   108   --    CO2  22   --   26   --    --   26   --    GLUCOSE  140*   --   156*   --    --   166*   --    BUN  31*   --   37*   --    --   43*   --    CREATININE  1.17   --   1.03   --    --   1.27   --    CALCIUM  7.5*   --   7.7*   --    --   8.0*   --     < > = values in this interval not displayed.     Recent Labs      12/28/17   1710  12/29/17   0033  12/29/17   0148  12/29/17   0845   APTT  33.7  33.7  34.6   --    INR   --    --    --   1.29*       Medications:  • docusate sodium 100mg/10mL  100 mg      Or   • docusate sodium  100 mg     • enoxaparin (LOVENOX) injection  1 mg/kg     • ceFAZolin  2 g     • potassium bicarbonate  50 mEq     • aspirin  81 mg     • nystatin  5 mL     • K+ Scale: Goal of 4.5  1 Each     • insulin NPH  5 Units     • famotidine  20 mg     • chlorhexidine  15 mL     • insulin lispro  0-15 Units     • senna-docusate  1 Tab     • rosuvastatin  20 mg         Exam:   Review of Systems   Unable to perform  ROS: Intubated       Physical Exam   Constitutional: No distress. He is intubated.   Morbidly obese   Neck: Neck supple.   Cardiovascular: Normal rate and regular rhythm.  Exam reveals friction rub. Exam reveals no gallop.    No murmur heard.  Pulmonary/Chest: Effort normal. He is intubated. No respiratory distress. He has decreased breath sounds in the right lower field and the left lower field.   Abdominal: Soft. He exhibits no distension.   Musculoskeletal: He exhibits edema.   Neurological:   sedated   Skin: Skin is warm.   Psychiatric:   JUAREZ       Quality Measures:     Reviewed items::  EKG reviewed, Labs reviewed, Medications reviewed and Radiology images reviewed  Jenkins catheter::  One or Two Days Post Surgery (Day of Surgery being Day 0) and Critically Ill - Requiring Accurate Measurement of Urinary Output  Central line in place:  Need for access and Vasopressors  DVT prophylaxis pharmacological::  Contraindicated - High bleeding risk  DVT prophylaxis - mechanical:  SCDs  Ulcer Prophylaxis::  Yes      Assessment/Plan:  POD 1 - HOTN- IABP 1:2, epi/jazz, vent- peep inc this am, keep CT/jenkins, CPM  POD 2 HOTN - epi/jazz- IABP 1:2, Vent - pulm following, s/p PEA arrest yesterday- bronch with lots of thick secretions, sedated, CPM  POD 3 - HOTN - epi/jazz- decreased from yesterday, IABP 1:2, Vent - CMV 26, PEEP 10, sedated, diurese well yesterday- start lasix gtt today, CPM  POD 4 NSR, Hotn-epi/jazz, down slightly form yesterday.  IABP 1:2 no change in pressures with pump on standby--change to 1:4.  On lasix gtt, diuresing well.  FiO2 down to 40%, PEEP 10.  Continue lasix gtt.  Will plan on removing IABP today. CPM.   POD 5 HDS, SB- d/c amio, epi/jazz- weaning, Vent- per pulm, sedated when awake follows per RN, lasix gtt- cont, CPM  POD 6 HDS, NSR, epi/jazz--weaning.  On lasix gtt.  WBCs up, bronch yesterday now on ABX.  Mod r effusion.  Will tap.  Keep SERGE drain L thigh one more day.Keep CTs while vented.  CPM.  POD 7  HDS, on low dose epi, off jazz.  NSR. RUE thrombus started on heparin gtt--held this AM for thoracentesis.  Vent 40%/peep 8.  Sedation weaning.  PLAN:  DC temp wires, resume anticoagulation after thoracentesis.  Vent per pulm. Keep CTs today.      Active Hospital Problems    Diagnosis   • CAD (coronary artery disease) [I25.10]   • CHF (congestive heart failure) (CMS-HCC) [I50.9]

## 2017-12-29 NOTE — PROGRESS NOTES
Pt alert and following commands, able to wiggle toes to respond to questions.  Daughter and son-in-law at bedside during night, reviewed plan of care for today with them, expecting a thoracentesis today.  Pt denies pain at this time, remains on precedex, propofol and fentanyl for comfort.  Pt with coughing episodes that increase heart rate, decrease 02 saturations, family concerned, reassured that this is normal and patient recovers quickly.

## 2017-12-29 NOTE — PROGRESS NOTES
Ultrasound tech called and requested heparin drip stopped and PT/INR sent in anticipation of thoracentesis later this morning.  Daughter able to sign consent for procedure.

## 2017-12-29 NOTE — CARE PLAN
Problem: Ventilation Defect:  Goal: Ability to achieve and maintain unassisted ventilation or tolerate decreased levels of ventilator support    Intervention: Support and monitor invasive and noninvasive mechanical ventilation  Adult Ventilation Update    Total Vent Days: 8    Patient Lines/Drains/Airways Status    Active Airway     Name: Placement date: Placement time: Site: Days:    Airway Group ET Tube Oral 9.0 12/22/17   0753   Oral   8              Barriers to Wean: Other (Comments) (hypotensive) (12/28/17 1542)    Cough: Productive (12/29/17 0400)  Sputum Amount: Small (12/29/17 0400)  Sputum Color: Tan;White (12/29/17 0400)  Sputum Consistency: Thin;Thick (12/29/17 0400)    Mobility Group  Activity Performed: Unable to mobilize (12/29/17 0400)  Reason Not Mobilized: Bed rest (12/29/17 0400)    Events/Summary/Plan: no vent changes made

## 2017-12-29 NOTE — CARE PLAN
Problem: Ventilation Defect:  Goal: Ability to achieve and maintain unassisted ventilation or tolerate decreased levels of ventilator support    Intervention: Support and monitor invasive and noninvasive mechanical ventilation  Adult Ventilation Update    Total Vent Days: 7  Vent: APVCMV 26/540/+8/40%    Patient Lines/Drains/Airways Status    Active Airway     Name: Placement date: Placement time: Site: Days:    Airway Group ET Tube Oral 9.0 12/22/17                   Plateau Pressure (Q Shift): 20.2   Static Compliance (ml / cm H2O): 45     Patient failed trials because of Barriers to Wean: Other (Comments) (hypotensive)     Sputum/Suction   Cough: Productive;Strong   Sputum Amount: Small   Sputum Color: Clear;Tan;White   Sputum Consistency: Thick;Thin     Mobility Group  Activity Performed: Unable to mobilize     Events/Summary/Plan: Fi02 down to 40% (12/28/17 0937)    Pt hypotensive and unable to wean at this time.

## 2017-12-29 NOTE — PROGRESS NOTES
Received report from yannick Koenig. Patient intubated, sedated, on low does vasopressors in appropriately applied bilateral restraints. Stable vitals on current settings and all family questions answered.

## 2017-12-30 ENCOUNTER — APPOINTMENT (OUTPATIENT)
Dept: RADIOLOGY | Facility: MEDICAL CENTER | Age: 52
DRG: 003 | End: 2017-12-30
Attending: INTERNAL MEDICINE
Payer: MEDICARE

## 2017-12-30 LAB
ACTION RANGE TRIGGERED IACRT: NO
ANION GAP SERPL CALC-SCNC: 11 MMOL/L (ref 0–11.9)
BASE EXCESS BLDA CALC-SCNC: 7 MMOL/L (ref -4–3)
BODY TEMPERATURE: ABNORMAL DEGREES
BUN SERPL-MCNC: 44 MG/DL (ref 8–22)
CALCIUM SERPL-MCNC: 7.8 MG/DL (ref 8.5–10.5)
CHLORIDE SERPL-SCNC: 104 MMOL/L (ref 96–112)
CO2 BLDA-SCNC: 30 MMOL/L (ref 20–33)
CO2 SERPL-SCNC: 26 MMOL/L (ref 20–33)
CREAT SERPL-MCNC: 1.14 MG/DL (ref 0.5–1.4)
EKG IMPRESSION: NORMAL
ERYTHROCYTE [DISTWIDTH] IN BLOOD BY AUTOMATED COUNT: 61.2 FL (ref 35.9–50)
GFR SERPL CREATININE-BSD FRML MDRD: >60 ML/MIN/1.73 M 2
GLUCOSE BLD-MCNC: 159 MG/DL (ref 65–99)
GLUCOSE BLD-MCNC: 168 MG/DL (ref 65–99)
GLUCOSE BLD-MCNC: 172 MG/DL (ref 65–99)
GLUCOSE BLD-MCNC: 179 MG/DL (ref 65–99)
GLUCOSE BLD-MCNC: 182 MG/DL (ref 65–99)
GLUCOSE BLD-MCNC: 184 MG/DL (ref 65–99)
GLUCOSE BLD-MCNC: 196 MG/DL (ref 65–99)
GLUCOSE SERPL-MCNC: 175 MG/DL (ref 65–99)
HBV CORE AB SERPL QL IA: NEGATIVE
HBV SURFACE AG SER QL: NEGATIVE
HCO3 BLDA-SCNC: 29.3 MMOL/L (ref 17–25)
HCT VFR BLD AUTO: 28 % (ref 42–52)
HCV AB SER QL: NEGATIVE
HGB BLD-MCNC: 8.4 G/DL (ref 14–18)
HIV 1+2 AB+HIV1 P24 AG SERPL QL IA: NON REACTIVE
INST. QUALIFIED PATIENT IIQPT: YES
MCH RBC QN AUTO: 24.6 PG (ref 27–33)
MCHC RBC AUTO-ENTMCNC: 30 G/DL (ref 33.7–35.3)
MCV RBC AUTO: 81.9 FL (ref 81.4–97.8)
O2/TOTAL GAS SETTING VFR VENT: 40 %
PCO2 BLDA: 33 MMHG (ref 26–37)
PCO2 TEMP ADJ BLDA: 35 MMHG (ref 26–37)
PH BLDA: 7.56 [PH] (ref 7.4–7.5)
PH TEMP ADJ BLDA: 7.54 [PH] (ref 7.4–7.5)
PLATELET # BLD AUTO: 485 K/UL (ref 164–446)
PMV BLD AUTO: 10.8 FL (ref 9–12.9)
PO2 BLDA: 68 MMHG (ref 64–87)
PO2 TEMP ADJ BLDA: 75 MMHG (ref 64–87)
POTASSIUM SERPL-SCNC: 3.7 MMOL/L (ref 3.6–5.5)
POTASSIUM SERPL-SCNC: 3.8 MMOL/L (ref 3.6–5.5)
POTASSIUM SERPL-SCNC: 3.9 MMOL/L (ref 3.6–5.5)
POTASSIUM SERPL-SCNC: 4.5 MMOL/L (ref 3.6–5.5)
POTASSIUM SERPL-SCNC: 4.6 MMOL/L (ref 3.6–5.5)
RBC # BLD AUTO: 3.42 M/UL (ref 4.7–6.1)
SAO2 % BLDA: 96 % (ref 93–99)
SODIUM SERPL-SCNC: 141 MMOL/L (ref 135–145)
SPECIMEN DRAWN FROM PATIENT: ABNORMAL
TRIGL SERPL-MCNC: 173 MG/DL (ref 0–149)
WBC # BLD AUTO: 27.6 K/UL (ref 4.8–10.8)

## 2017-12-30 PROCEDURE — A9270 NON-COVERED ITEM OR SERVICE: HCPCS | Performed by: CLINICAL NURSE SPECIALIST

## 2017-12-30 PROCEDURE — A9270 NON-COVERED ITEM OR SERVICE: HCPCS | Performed by: INTERNAL MEDICINE

## 2017-12-30 PROCEDURE — G0475 HIV COMBINATION ASSAY: HCPCS

## 2017-12-30 PROCEDURE — 85027 COMPLETE CBC AUTOMATED: CPT

## 2017-12-30 PROCEDURE — 93010 ELECTROCARDIOGRAM REPORT: CPT | Performed by: INTERNAL MEDICINE

## 2017-12-30 PROCEDURE — 700101 HCHG RX REV CODE 250

## 2017-12-30 PROCEDURE — 700102 HCHG RX REV CODE 250 W/ 637 OVERRIDE(OP): Performed by: INTERNAL MEDICINE

## 2017-12-30 PROCEDURE — 36620 INSERTION CATHETER ARTERY: CPT

## 2017-12-30 PROCEDURE — A9270 NON-COVERED ITEM OR SERVICE: HCPCS | Performed by: THORACIC SURGERY (CARDIOTHORACIC VASCULAR SURGERY)

## 2017-12-30 PROCEDURE — 700111 HCHG RX REV CODE 636 W/ 250 OVERRIDE (IP): Performed by: THORACIC SURGERY (CARDIOTHORACIC VASCULAR SURGERY)

## 2017-12-30 PROCEDURE — 700102 HCHG RX REV CODE 250 W/ 637 OVERRIDE(OP): Performed by: THORACIC SURGERY (CARDIOTHORACIC VASCULAR SURGERY)

## 2017-12-30 PROCEDURE — 82803 BLOOD GASES ANY COMBINATION: CPT

## 2017-12-30 PROCEDURE — 700111 HCHG RX REV CODE 636 W/ 250 OVERRIDE (IP): Performed by: NURSE PRACTITIONER

## 2017-12-30 PROCEDURE — 700112 HCHG RX REV CODE 229: Performed by: CLINICAL NURSE SPECIALIST

## 2017-12-30 PROCEDURE — 84132 ASSAY OF SERUM POTASSIUM: CPT

## 2017-12-30 PROCEDURE — 93005 ELECTROCARDIOGRAM TRACING: CPT | Performed by: NURSE PRACTITIONER

## 2017-12-30 PROCEDURE — 87340 HEPATITIS B SURFACE AG IA: CPT

## 2017-12-30 PROCEDURE — 700111 HCHG RX REV CODE 636 W/ 250 OVERRIDE (IP): Performed by: INTERNAL MEDICINE

## 2017-12-30 PROCEDURE — 700105 HCHG RX REV CODE 258: Performed by: NURSE PRACTITIONER

## 2017-12-30 PROCEDURE — 770022 HCHG ROOM/CARE - ICU (200)

## 2017-12-30 PROCEDURE — 700102 HCHG RX REV CODE 250 W/ 637 OVERRIDE(OP): Performed by: CLINICAL NURSE SPECIALIST

## 2017-12-30 PROCEDURE — 80048 BASIC METABOLIC PNL TOTAL CA: CPT

## 2017-12-30 PROCEDURE — 84478 ASSAY OF TRIGLYCERIDES: CPT

## 2017-12-30 PROCEDURE — 71010 DX-CHEST-PORTABLE (1 VIEW): CPT

## 2017-12-30 PROCEDURE — 700105 HCHG RX REV CODE 258: Performed by: INTERNAL MEDICINE

## 2017-12-30 PROCEDURE — 86803 HEPATITIS C AB TEST: CPT

## 2017-12-30 PROCEDURE — 302131 K PAD MOTOR: Performed by: NURSE PRACTITIONER

## 2017-12-30 PROCEDURE — 302151 K-PAD 14X20: Performed by: NURSE PRACTITIONER

## 2017-12-30 PROCEDURE — 94003 VENT MGMT INPAT SUBQ DAY: CPT

## 2017-12-30 PROCEDURE — 86704 HEP B CORE ANTIBODY TOTAL: CPT

## 2017-12-30 PROCEDURE — 36600 WITHDRAWAL OF ARTERIAL BLOOD: CPT

## 2017-12-30 PROCEDURE — 700105 HCHG RX REV CODE 258: Performed by: THORACIC SURGERY (CARDIOTHORACIC VASCULAR SURGERY)

## 2017-12-30 PROCEDURE — 700111 HCHG RX REV CODE 636 W/ 250 OVERRIDE (IP): Performed by: CLINICAL NURSE SPECIALIST

## 2017-12-30 PROCEDURE — 82962 GLUCOSE BLOOD TEST: CPT | Mod: 91

## 2017-12-30 RX ORDER — AMIODARONE HYDROCHLORIDE 200 MG/1
400 TABLET ORAL TWICE DAILY
Status: DISCONTINUED | OUTPATIENT
Start: 2017-12-30 | End: 2018-01-10 | Stop reason: HOSPADM

## 2017-12-30 RX ORDER — POTASSIUM CHLORIDE 7.45 MG/ML
10 INJECTION INTRAVENOUS ONCE
Status: COMPLETED | OUTPATIENT
Start: 2017-12-30 | End: 2017-12-30

## 2017-12-30 RX ORDER — LIDOCAINE HYDROCHLORIDE 10 MG/ML
INJECTION, SOLUTION INFILTRATION; PERINEURAL
Status: COMPLETED
Start: 2017-12-30 | End: 2017-12-30

## 2017-12-30 RX ORDER — POTASSIUM CHLORIDE 14.9 MG/ML
20 INJECTION INTRAVENOUS ONCE
Status: COMPLETED | OUTPATIENT
Start: 2017-12-30 | End: 2017-12-30

## 2017-12-30 RX ADMIN — EPINEPHRINE 0.03 MCG/KG/MIN: 1 INJECTION, SOLUTION INTRAMUSCULAR; SUBCUTANEOUS at 20:39

## 2017-12-30 RX ADMIN — AMIODARONE HYDROCHLORIDE 400 MG: 200 TABLET ORAL at 10:54

## 2017-12-30 RX ADMIN — PROPOFOL 70 MCG/KG/MIN: 10 INJECTION, EMULSION INTRAVENOUS at 14:49

## 2017-12-30 RX ADMIN — PROPOFOL 65 MCG/KG/MIN: 10 INJECTION, EMULSION INTRAVENOUS at 18:31

## 2017-12-30 RX ADMIN — PROPOFOL 50 MCG/KG/MIN: 10 INJECTION, EMULSION INTRAVENOUS at 02:56

## 2017-12-30 RX ADMIN — ROSUVASTATIN CALCIUM 20 MG: 20 TABLET, FILM COATED ORAL at 19:54

## 2017-12-30 RX ADMIN — POTASSIUM BICARBONATE 50 MEQ: 25 TABLET, EFFERVESCENT ORAL at 00:37

## 2017-12-30 RX ADMIN — ENOXAPARIN SODIUM 150 MG: 150 INJECTION SUBCUTANEOUS at 07:51

## 2017-12-30 RX ADMIN — AMIODARONE HYDROCHLORIDE 0.5 MG/MIN: 50 INJECTION, SOLUTION INTRAVENOUS at 17:24

## 2017-12-30 RX ADMIN — INSULIN HUMAN 5 UNITS: 100 INJECTION, SUSPENSION SUBCUTANEOUS at 22:16

## 2017-12-30 RX ADMIN — FAMOTIDINE 20 MG: 20 TABLET, FILM COATED ORAL at 19:54

## 2017-12-30 RX ADMIN — STANDARDIZED SENNA CONCENTRATE AND DOCUSATE SODIUM 1 TABLET: 8.6; 5 TABLET, FILM COATED ORAL at 19:54

## 2017-12-30 RX ADMIN — LIDOCAINE HYDROCHLORIDE: 10 INJECTION, SOLUTION INFILTRATION; PERINEURAL at 12:06

## 2017-12-30 RX ADMIN — ASPIRIN 81 MG: 81 TABLET, CHEWABLE ORAL at 07:50

## 2017-12-30 RX ADMIN — NYSTATIN 500000 UNITS: 100000 SUSPENSION ORAL at 19:54

## 2017-12-30 RX ADMIN — POTASSIUM BICARBONATE 50 MEQ: 25 TABLET, EFFERVESCENT ORAL at 10:54

## 2017-12-30 RX ADMIN — DOCUSATE SODIUM 100 MG: 50 LIQUID ORAL at 07:51

## 2017-12-30 RX ADMIN — INSULIN HUMAN 5 UNITS: 100 INJECTION, SUSPENSION SUBCUTANEOUS at 06:20

## 2017-12-30 RX ADMIN — FUROSEMIDE 10 MG/HR: 10 INJECTION, SOLUTION INTRAMUSCULAR; INTRAVENOUS at 22:23

## 2017-12-30 RX ADMIN — POTASSIUM BICARBONATE 50 MEQ: 25 TABLET, EFFERVESCENT ORAL at 19:53

## 2017-12-30 RX ADMIN — PROPOFOL 65 MCG/KG/MIN: 10 INJECTION, EMULSION INTRAVENOUS at 21:40

## 2017-12-30 RX ADMIN — POTASSIUM CHLORIDE 10 MEQ: 7.46 INJECTION, SOLUTION INTRAVENOUS at 01:45

## 2017-12-30 RX ADMIN — PROPOFOL 55 MCG/KG/MIN: 10 INJECTION, EMULSION INTRAVENOUS at 07:49

## 2017-12-30 RX ADMIN — AMIODARONE HYDROCHLORIDE 150 MG: 1.5 INJECTION, SOLUTION INTRAVENOUS at 00:38

## 2017-12-30 RX ADMIN — CHLORHEXIDINE GLUCONATE 15 ML: 1.2 RINSE ORAL at 07:50

## 2017-12-30 RX ADMIN — EPINEPHRINE 0.05 MCG/KG/MIN: 1 INJECTION, SOLUTION INTRAMUSCULAR; SUBCUTANEOUS at 03:37

## 2017-12-30 RX ADMIN — PROPOFOL 50 MCG/KG/MIN: 10 INJECTION, EMULSION INTRAVENOUS at 01:03

## 2017-12-30 RX ADMIN — FAMOTIDINE 20 MG: 20 TABLET, FILM COATED ORAL at 07:50

## 2017-12-30 RX ADMIN — FUROSEMIDE 10 MG/HR: 10 INJECTION, SOLUTION INTRAMUSCULAR; INTRAVENOUS at 00:47

## 2017-12-30 RX ADMIN — Medication 100 MCG/HR: at 09:28

## 2017-12-30 RX ADMIN — PROPOFOL 70 MCG/KG/MIN: 10 INJECTION, EMULSION INTRAVENOUS at 13:16

## 2017-12-30 RX ADMIN — POTASSIUM CHLORIDE 20 MEQ: 200 INJECTION, SOLUTION INTRAVENOUS at 18:25

## 2017-12-30 RX ADMIN — PROPOFOL 60 MCG/KG/MIN: 10 INJECTION, EMULSION INTRAVENOUS at 09:49

## 2017-12-30 RX ADMIN — AMIODARONE HYDROCHLORIDE 400 MG: 200 TABLET ORAL at 19:53

## 2017-12-30 RX ADMIN — POTASSIUM CHLORIDE 10 MEQ: 7.46 INJECTION, SOLUTION INTRAVENOUS at 08:20

## 2017-12-30 RX ADMIN — PROPOFOL 65 MCG/KG/MIN: 10 INJECTION, EMULSION INTRAVENOUS at 11:39

## 2017-12-30 RX ADMIN — INSULIN HUMAN 5 UNITS: 100 INJECTION, SUSPENSION SUBCUTANEOUS at 14:59

## 2017-12-30 RX ADMIN — PROPOFOL 65 MCG/KG/MIN: 10 INJECTION, EMULSION INTRAVENOUS at 20:00

## 2017-12-30 RX ADMIN — POTASSIUM BICARBONATE 50 MEQ: 25 TABLET, EFFERVESCENT ORAL at 06:13

## 2017-12-30 RX ADMIN — PROPOFOL 50 MCG/KG/MIN: 10 INJECTION, EMULSION INTRAVENOUS at 04:00

## 2017-12-30 RX ADMIN — CEFAZOLIN SODIUM 2 G: 2 INJECTION, SOLUTION INTRAVENOUS at 14:58

## 2017-12-30 RX ADMIN — NYSTATIN 500000 UNITS: 100000 SUSPENSION ORAL at 14:58

## 2017-12-30 RX ADMIN — CEFAZOLIN SODIUM 2 G: 2 INJECTION, SOLUTION INTRAVENOUS at 06:13

## 2017-12-30 RX ADMIN — PROPOFOL 65 MCG/KG/MIN: 10 INJECTION, EMULSION INTRAVENOUS at 16:27

## 2017-12-30 RX ADMIN — FUROSEMIDE 10 MG/HR: 10 INJECTION, SOLUTION INTRAMUSCULAR; INTRAVENOUS at 11:40

## 2017-12-30 RX ADMIN — PHENYLEPHRINE HYDROCHLORIDE 20 MCG/MIN: 10 INJECTION INTRAVENOUS at 10:58

## 2017-12-30 RX ADMIN — CHLORHEXIDINE GLUCONATE 15 ML: 1.2 RINSE ORAL at 19:54

## 2017-12-30 RX ADMIN — NYSTATIN 500000 UNITS: 100000 SUSPENSION ORAL at 08:07

## 2017-12-30 RX ADMIN — CEFAZOLIN SODIUM 2 G: 2 INJECTION, SOLUTION INTRAVENOUS at 22:11

## 2017-12-30 RX ADMIN — DIPHENHYDRAMINE HCL 25 MG: 25 TABLET ORAL at 19:54

## 2017-12-30 NOTE — CARE PLAN
Problem: Ventilation Defect:  Goal: Ability to achieve and maintain unassisted ventilation or tolerate decreased levels of ventilator support    Intervention: Support and monitor invasive and noninvasive mechanical ventilation  Adult Ventilation Update    Total Vent Days: 8  Vent: APVCMV 26/540/+8/40%  No SBT - Hypotensive    Patient Lines/Drains/Airways Status    Active Airway     Name: Placement date: Placement time: Site: Days:    Airway Group ET Tube Oral 9.0 12/22/17                   Plateau Pressure (Q Shift): 19.1 (12/29/17 0749)  Static Compliance (ml / cm H2O): 59 (12/29/17 1541)    Sputum/Suction   Cough: Productive   Sputum Amount: Small   Sputum Color: Tan;White  Sputum Consistency: Thin;Thick (    Mobility Group  Activity Performed: Unable to mobilize     Events/Summary/Plan: Fi02 down to 40% (12/28/17 0937)    Wait for normotensive hemodynamics to initiate SBT.

## 2017-12-30 NOTE — CARE PLAN
Problem: Communication  Goal: The ability to communicate needs accurately and effectively will improve    Intervention: Reorient patient to environment as needed  Pt trying to communicate around ET tube by pointing and gesturing, able to make some needs and discomfort known.  Family at bedside asking questions to try and understand pt's requests.      Problem: Knowledge Deficit  Goal: Knowledge of the prescribed therapeutic regimen will improve    Intervention: Discuss information regarding therpeutic regimen and document in education  Information supplied to family about pt condition and treatment plan, emotional support provided as needed.      Problem: Risk for impaired circulation/Tissue perfusion  Goal: Optimal post surgical circulation/tissue perfusion    Intervention: Assess color, temperature, peripheral pulses and capillary refill  Pt converted to atrial fibrillation this afternoon, amiodarone protocol ordered.  Previously patient was in sinus rhythm, rate 70-80 with frequent PACs and PVCs.  OR=0.20/QRS=0.10/QT=0.38.      Problem: Pain Management  Goal: Pain level will decrease to patient's comfort goal    Intervention: Follow pain managment plan developed in collaboration with patient and Interdisciplinary Team  Pt on fentanyl drip, rate increased this afternoon per daughter request for patient symptoms of discomfort.  Pt also medicated with oxycodone per order.

## 2017-12-30 NOTE — PROGRESS NOTES
Cardiovascular Surgery Progress Note    Name: Joshua Medrano  MRN: [id number]  : 1965  Admit Date: 2017 10:21 AM  Procedure:  Procedure(s) and Anesthesia Type:     * MULTIPLE CORONARY ARTERY BYPASS ENDO VEIN HARVEST X2 - General     * JIM - General  8 Day Post-Op    Vitals:  Patient Vitals for the past 8 hrs:   SpO2 Heart Rate (Monitored)   17 0714 93 % (!) 118   17 0501 95 % (!) 117   17 0306 95 % (!) 115     No data recorded.      Respiratory:  Leo Vent Mode: APVCMV, Rate (breaths/min): 26, PEEP/CPAP: 8, FiO2: 40, P Peak (PIP): 26, P MEAN: 14 Respiration: (!) 26, Pulse Oximetry: 93 %     Chest Tube Drains:  Francisco Javier Girard 1: 80 ml  Mediastinal Chest Tube 1: 0 ml    Fluids:    Intake/Output Summary (Last 24 hours) at 17 0823  Last data filed at 17 0600   Gross per 24 hour   Intake           2210.1 ml   Output             3800 ml   Net          -1589.9 ml     Admit weight: Weight: (!) 159.2 kg (350 lb 15.6 oz)  Current weight: Weight: (!) 159 kg (350 lb 8.5 oz) (17 0400)    Labs:  Recent Labs      17   0428  17   0537  17   0407   WBC  30.3*  30.4*  27.6*   RBC  3.71*  3.57*  3.42*   HEMOGLOBIN  9.1*  8.5*  8.4*   HEMATOCRIT  29.8*  28.6*  28.0*   MCV  80.3*  80.1*  81.9   MCH  24.5*  23.8*  24.6*   MCHC  30.5*  29.7*  30.0*   RDW  59.4*  60.3*  61.2*   PLATELETCT  311  421  485*   MPV  11.1  10.8  10.8         Recent Labs      17   0428   17   0537   17   0025  17   0407  17   0600   SODIUM  138   --   143   --    --   141   --    POTASSIUM  3.7   < >  3.8   < >  3.8  4.6  3.9   CHLORIDE  108   --   108   --    --   104   --    CO2  26   --   26   --    --   26   --    GLUCOSE  156*   --   166*   --    --   175*   --    BUN  37*   --   43*   --    --   44*   --    CREATININE  1.03   --   1.27   --    --   1.14   --    CALCIUM  7.7*   --   8.0*   --    --   7.8*   --     < > = values in this interval not displayed.      Recent Labs      12/28/17   1710  12/29/17   0033  12/29/17   0148  12/29/17   0845   APTT  33.7  33.7  34.6   --    INR   --    --    --   1.29*       Medications:  • potassium chloride (KCL-CENTRAL) IV *Administer in ICU only*  10 mEq     • docusate sodium 100mg/10mL  100 mg      Or   • docusate sodium  100 mg     • enoxaparin (LOVENOX) injection  1 mg/kg     • ceFAZolin  2 g     • potassium bicarbonate  50 mEq     • aspirin  81 mg     • nystatin  5 mL     • K+ Scale: Goal of 4.5  1 Each     • insulin NPH  5 Units     • famotidine  20 mg     • chlorhexidine  15 mL     • insulin lispro  0-15 Units     • senna-docusate  1 Tab     • rosuvastatin  20 mg         Exam:   Review of Systems   Unable to perform ROS: Intubated       Physical Exam   Constitutional: No distress. He is intubated.   Morbidly obese   Neck: Neck supple.   Cardiovascular: Normal rate and regular rhythm.  Exam reveals friction rub. Exam reveals no gallop.    No murmur heard.  Pulmonary/Chest: Effort normal. He is intubated. No respiratory distress. He has decreased breath sounds in the right lower field and the left lower field.   Abdominal: Soft. He exhibits no distension.   Musculoskeletal: He exhibits edema.   Neurological:   sedated   Skin: Skin is warm.   Psychiatric:   JUAREZ       Quality Measures:     Reviewed items::  EKG reviewed, Labs reviewed, Medications reviewed and Radiology images reviewed  Jenkins catheter::  One or Two Days Post Surgery (Day of Surgery being Day 0) and Critically Ill - Requiring Accurate Measurement of Urinary Output  Central line in place:  Need for access and Vasopressors  DVT prophylaxis pharmacological::  Contraindicated - High bleeding risk  DVT prophylaxis - mechanical:  SCDs  Ulcer Prophylaxis::  Yes      Assessment/Plan:  POD 1 - HOTN- IABP 1:2, epi/jazz, vent- peep inc this am, keep CT/jenkins, CPM  POD 2 HOTN - epi/jazz- IABP 1:2, Vent - pulm following, s/p PEA arrest yesterday- bronch with lots of thick  secretions, sedated, CPM  POD 3 - HOTN - epi/jazz- decreased from yesterday, IABP 1:2, Vent - CMV 26, PEEP 10, sedated, diurese well yesterday- start lasix gtt today, CPM  POD 4 NSR, Hotn-epi/jazz, down slightly form yesterday.  IABP 1:2 no change in pressures with pump on standby--change to 1:4.  On lasix gtt, diuresing well.  FiO2 down to 40%, PEEP 10.  Continue lasix gtt.  Will plan on removing IABP today. CPM.   POD 5 HDS, SB- d/c amio, epi/jazz- weaning, Vent- per pulm, sedated when awake follows per RN, lasix gtt- cont, CPM  POD 6 HDS, NSR, epi/jazz--weaning.  On lasix gtt.  WBCs up, bronch yesterday now on ABX.  Mod r effusion.  Will tap.  Keep SERGE drain L thigh one more day.Keep CTs while vented.  CPM.  POD 7 HDS, on low dose epi, off jazz.  NSR. RUE thrombus started on heparin gtt--held this AM for thoracentesis.  Vent 40%/peep 8.  Sedation weaning.  PLAN:  DC temp wires, resume anticoagulation after thoracentesis.  Vent per pulm. Keep CTs today.  POD 8 Remains on epi, no change.  Back in afib yesterday, rebolused amio, now AT vs a flutter?  Cardiology to address. On lovenox.  CXR remains with edema/effusions, unable to perform thoracentesis yesterday. Keep lasix gtt today and k-scale.   Agitated, kept on sedation.  Vent per PMA.  May need to move toward trach next few days.  Plan to hold lovenox in AM to DC CTs and SERGE drain.  DC prevena, DC staples EV site. CPM.      Active Hospital Problems    Diagnosis   • CAD (coronary artery disease) [I25.10]   • CHF (congestive heart failure) (CMS-HCC) [I50.9]

## 2017-12-30 NOTE — CARE PLAN
Problem: Ventilation Defect:  Goal: Ability to achieve and maintain unassisted ventilation or tolerate decreased levels of ventilator support  Outcome: PROGRESSING SLOWER THAN EXPECTED    Intervention: Support and monitor invasive and noninvasive mechanical ventilation  Adult Ventilation Update    Total Vent Days: 9    Patient Lines/Drains/Airways Status    Active Airway     Name: Placement date: Placement time: Site: Days:    Airway Group ET Tube Oral 9.0 12/22/17   0753   Oral   9              Vent settings: 26 540 8 40%    Sputum/Suction   Cough: Productive (12/29/17 1600)  Sputum Amount: Small (12/29/17 1600)  Sputum Color: Tan;White (12/29/17 1600)  Sputum Consistency: Thin;Thick (12/29/17 1600)      Events/Summary/Plan: ABG drawn. Pt stable on vent, no changes made.

## 2017-12-30 NOTE — CONSULTS
Cardiology Progress Note    DOS: 12/30/2017    Consulting physician: Kiel Ash    Chief complaint/Reason for consult: AF with RVR    HPI:  Patient is a 51 yo gentleman with history of CAD s/p recent CABG now POD #8, complicated by PEA arrest on POD #1, respiratory failure, rising white count and thick secretions. Had been seen by Dr. Goodwin initially and signed off on the 21st. He went back into AF with RVR yesterday evening with rates in the 120s. IV amio was bolused and gtt was initiated. Cardiology asked to come back on board for management of AF.    ROS (+ highlighted in red):  Intubated and sedated, unable to perform ROS      Past Medical History:   Diagnosis Date   • ASTHMA    • Chronic obstructive pulmonary disease (CMS-HCC)    • Diabetes        Past Surgical History:   Procedure Laterality Date   • MULTIPLE CORONARY ARTERY BYPASS ENDO VEIN HARVEST  12/22/2017    Procedure: MULTIPLE CORONARY ARTERY BYPASS ENDO VEIN HARVEST X2;  Surgeon: Kiel Ash M.D.;  Location: SURGERY [facility];  Service: Cardiothoracic   • JIM  12/22/2017    Procedure: JMI;  Surgeon: Kiel Ash M.D.;  Location: SURGERY [facility];  Service: Cardiothoracic   • OTHER ABDOMINAL SURGERY         Social History     Social History   • Marital status:      Spouse name: N/A   • Number of children: N/A   • Years of education: N/A     Occupational History   • Not on file.     Social History Main Topics   • Smoking status: Current Every Day Smoker     Packs/day: 0.50     Years: 20.00     Types: Cigarettes   • Smokeless tobacco: Never Used   • Alcohol use No   • Drug use: No   • Sexual activity: Not on file     Other Topics Concern   • Not on file     Social History Narrative   • No narrative on file       No family history on file.    Allergies   Allergen Reactions   • Erythromycin Anaphylaxis   • Cillins [Penicillins] Rash     As a child   • Shellfish Allergy Anaphylaxis     Pt stated that he is allergic to all  seafood, will develop a rash and says that rash will clear up with benadryl       Current Facility-Administered Medications   Medication Dose Route Frequency Provider Last Rate Last Dose   • docusate sodium 100mg/10mL (COLACE) solution 100 mg  100 mg Oral DAILY Silvia Preston A.P.N.   100 mg at 12/30/17 0751    Or   • docusate sodium (COLACE) capsule 100 mg  100 mg Oral DAILY Silvia Preston A.P.N.       • enoxaparin (LOVENOX) injection 150 mg  1 mg/kg Subcutaneous Q12HRS Amrik Banegas M.D.   150 mg at 12/30/17 0751   • ceFAZolin (ANCEF) IVPB 2 g  2 g Intravenous Q8HRS Amrik Banegas M.D.   2 g at 12/30/17 0613   • amiodarone (CORDARONE) 450 mg in D5W 250 mL Infusion  0.5-1 mg/min Intravenous Continuous Amrik Banegas M.D. 17 mL/hr at 12/30/17 0000 0.5 mg/min at 12/30/17 0000   • potassium bicarbonate (KLYTE) 25 MEQ effervescent tablet TBEF 50 mEq  50 mEq Oral Q6HRS Amrik Banegas M.D.   50 mEq at 12/30/17 0613   • aspirin (ASA) chewable tab 81 mg  81 mg Oral DAILY Kiel Ash M.D.   81 mg at 12/30/17 0750   • tetrahydrozoline (VISINE) 0.05 % ophthalmic solution 1 Drop  1 Drop Both Eyes 4X/DAY PRN Amrik Banegas M.D.       • nystatin (MYCOSTATIN) 608764 UNIT/ML suspension 500,000 Units  5 mL Oral TID Amrik Banegas M.D.   500,000 Units at 12/30/17 0807   • furosemide (LASIX) 100 mg in  mL infusion  10 mg/hr Intravenous Continuous SUSI Alfred.P.N. 10 mL/hr at 12/30/17 0047 10 mg/hr at 12/30/17 0047   • K+ Scale: Goal of 4.5  1 Each Intravenous Q6HRS Radha Martinez A.P.N.   1 Each at 12/30/17 0600   • insulin NPH (HUMULIN,NOVOLIN) injection 5 Units  5 Units Subcutaneous Q8HRS Silvia Preston A.P.N.   5 Units at 12/30/17 0620   • phenylephrine (KRISTIN-SYNEPHRINE) 80,000 mcg in  mL Infusion  0-50 mcg/min Intravenous Continuous SUSI Alfred.P.N.   Stopped at 12/28/17 0940   • famotidine (PEPCID) tablet 20 mg  20 mg Oral BID Rodolfo Landa M.D.   20 mg at 12/30/17 0750   •  chlorhexidine (PERIDEX) 0.12 % solution 15 mL  15 mL Mouth/Throat BID Rodolfo Landa M.D.   15 mL at 12/30/17 0750   • fentaNYL (SUBLIMAZE) 50 mcg/mL in 50mL (Continuous Infusion)   Intravenous Continuous Rodolfo Landa M.D. 2 mL/hr at 12/30/17 0928 100 mcg/hr at 12/30/17 0928   • insulin lispro (HUMALOG) injection 0-15 Units  0-15 Units Subcutaneous Q6HRS SUSI Alfred.PAlexNAlex   3 Units at 12/30/17 0620   • Respiratory Care per Protocol   Nebulization Continuous RT Silvia Preston A.P.N.       • NS infusion   Intravenous Continuous Silvia Preston A.P.N. 10 mL/hr at 12/28/17 0712     • Pharmacy Consult Request ...Pain Management Review 1 Each  1 Each Other PRN Silvia Preston A.P.N.       • senna-docusate (PERICOLACE or SENOKOT S) 8.6-50 MG per tablet 1 Tab  1 Tab Oral Nightly Silvia Preston A.P.N.   1 Tab at 12/29/17 2201    And   • senna-docusate (PERICOLACE or SENOKOT S) 8.6-50 MG per tablet 1 Tab  1 Tab Oral Q24HRS PRN Silvia Preston A.P.N.   1 Tab at 12/28/17 0732    And   • lactulose 20 GM/30ML solution 30 mL  30 mL Oral Q24HRS PRN Silvia Preston A.P.N.        And   • bisacodyl (DULCOLAX) suppository 10 mg  10 mg Rectal Q24HRS PRN Silvia Preston, A.P.N.   10 mg at 12/26/17 2200    And   • fleet enema 133 mL  1 Each Rectal Once PRN Silvia Preston A.P.N.       • electrolyte-A (PLASMALYTE-A) infusion   Intravenous PRN Silvia Preston, A.P.N.       • oxycodone immediate release (ROXICODONE) tablet 5 mg  5 mg Oral Q3HRS PRN Silvia Preston A.P.N.       • oxycodone immediate release (ROXICODONE) tablet 10 mg  10 mg Oral Q3HRS PRN Silvia Preston, A.P.N.   10 mg at 12/29/17 1804   • morphine (pf) 4 mg/ml injection 4 mg  4 mg Intravenous Q3HRS PRN Silvia Preston, A.P.N.       • tramadol (ULTRAM) 50 MG tablet 50 mg  50 mg Oral Q4HRS PRN Silvia Preston, A.P.N.       • midazolam (VERSED) 2 MG/2ML injection 0.5-2 mg  0.5-2 mg Intravenous Q HOUR PRN Silvia Preston, A.P.N.   2 mg at 12/24/17 0017   • sodium  bicarbonate 8.4 % injection 50 mEq  50 mEq Intravenous Q HOUR PRN Silvia Preston, A.P.N.       • morphine (pf) 4 mg/ml injection 4 mg  4 mg Intravenous Q HOUR PRN Silvia Preston, A.P.N.   4 mg at 12/23/17 0239   • ondansetron (ZOFRAN) syringe/vial injection 4 mg  4 mg Intravenous Q6HRS PRN Silvia Preston, A.P.N.        Or   • prochlorperazine (COMPAZINE) injection 10 mg  10 mg Intravenous Q6HRS PRN Silvia Preston, A.P.N.        Or   • promethazine (PHENERGAN) suppository 25 mg  25 mg Rectal Q6HRS PRN Silvia Preston, A.P.N.       • acetaminophen (TYLENOL) tablet 650 mg  650 mg Oral Q4HRS PRN Silvia Preston, A.P.N.   650 mg at 12/25/17 1910    Or   • acetaminophen (TYLENOL) suppository 650 mg  650 mg Rectal Q4HRS PRN Silvia Preston, A.P.N.       • mag hydrox-al hydrox-simeth (MAALOX PLUS ES or MYLANTA DS) suspension 30 mL  30 mL Oral Q4HRS PRN Silvia Preston, A.P.N.       • diphenhydrAMINE (BENADRYL) tablet/capsule 25 mg  25 mg Oral HS PRN - MR X 1 Silvia Preston, A.P.N.       • epinephrine 1 mg/mL(1:1000) 8 mg in  mL Infusion  0-0.2 mcg/kg/min Intravenous Continuous Kiel Ash M.D. 34.5 mL/hr at 12/30/17 0500 0.06 mcg/kg/min at 12/30/17 0500   • propofol (DIPRIVAN) injection  0-80 mcg/kg/min Intravenous Continuous Silvia Preston A.P.N. 55.2 mL/hr at 12/30/17 0800 60 mcg/kg/min at 12/30/17 0800   • albuterol inhaler 2 Puff  2 Puff Inhalation Q4H PRN (RT) Kiel Ash M.D.       • alprazolam (XANAX) tablet 0.25 mg  0.25 mg Oral Q6HRS PRN Radha Martinez, A.P.N.   0.25 mg at 12/29/17 1202   • rosuvastatin (CRESTOR) tablet 20 mg  20 mg Oral Q EVENING Silvia Preston A.P.N.   20 mg at 12/29/17 2200   • ipratropium-albuterol (DUONEB) nebulizer solution 3 mL  3 mL Nebulization Q4H PRN (RT) Kiel Ash M.D.   3 mL at 12/23/17 1154       Physical Exam:  Vitals:    12/30/17 0700 12/30/17 0714 12/30/17 0800 12/30/17 0900   BP:       Pulse: (!) 118  61 94   Resp: (!) 26  (!) 26 (!) 27   Temp:       SpO2:   93%     Weight:       Height:         General appearance: Intubated, sedated, obese  Eyes: anicteric sclerae, moist conjunctivae  HENT: Atraumatic; oropharynx clear with moist mucous membranes and no mucosal ulcerations; normal hard and soft palate  Neck: Trachea midline; FROM, supple, no thyromegaly or lymphadenopathy  Lungs: Coarse BS bilaterally  CV: Irregularly, tachy, no MRGs, no JVD   Abdomen: Soft, non-tender; no masses or HSM  Extremities: No peripheral edema or extremity lymphadenopathy  Skin: Normal temperature, turgor and texture; + changes of chronic venostasis  Psych: AO x 0    Data:  Labs reviewed    Prior echo/stress results reviewed:  LVEF 35%    CXR interpreted by me:  Cardiomegaly, pulmonary edema    EKG interpreted by me:   AF with PVCs, ventricular rates in the 120s    Impression/Plan:  1)AF with RVR  2)CAD s/p CABG  3)Systolic CHF 2/2 ICM  4)Respiratory failure  5)S/p PEA arrest  6)Pneumonia  7)DVT    -His rates are not reasonable  -I would continue the IV amiodarone x 24 hours and add in a concurrent PO amiodarone load at 400 BID and this will help with rate control  -He has been on parenteral anticoagulation for his DVT since before he went into AF  -He may convert back to sinus on his own and stay in sinus  -If not back to sinus within a few days, would consider cardioversion before extubation, but by then he will have more amio in his system  -Continue anticoagulation    Gui Delcid MD

## 2017-12-30 NOTE — PROGRESS NOTES
Stat EKG ordered per APROSANA Antonio 0745. KAYLIE Antonio made aware of results. No new orders received.

## 2017-12-30 NOTE — PROGRESS NOTES
Patient returned to atrial fibrillation at a rate of 110-130.    Monet Antonio updated. Orders received to continue to monitor rate and utilize epinephrine drip if patient continues to become hypertensive.

## 2017-12-30 NOTE — PROGRESS NOTES
Patient converted back into atrial fibrillation with uncontrolled rate at 100-130.    Updated Monet Antonio. Orders received to administer one additional amiodarone bolus.

## 2017-12-30 NOTE — PROGRESS NOTES
Called lab about potassium not resulting in epic. Lab states that they they did not run the potasium because they did not see the order.      communicates that they will now run the potassium STAT.

## 2017-12-30 NOTE — CARE PLAN
Problem: Post Op Day 4 CABG/Heart Valve Replacement  Goal: Optimal care of the Post Op CABG/Heart Valve replacement Post Op Day 4  Outcome: PROGRESSING SLOWER THAN EXPECTED    Intervention: Daily Weights  Completed and documented in EPIC flow sheets.   Intervention: Shower daily and clean incisions twice daily with soap and water  Prevena in place.   Intervention: Up in chair for all meals  Unable to perform.  Intervention: Ambulate, increasing the distance each time x 3 and before bed  Patient mobilized to edge of bed for 5 minutes.  Intervention: Consider removal of jenkins, chest tube and pacer wire if not already done  Will address with MD in AM.

## 2017-12-31 ENCOUNTER — APPOINTMENT (OUTPATIENT)
Dept: RADIOLOGY | Facility: MEDICAL CENTER | Age: 52
DRG: 003 | End: 2017-12-31
Attending: INTERNAL MEDICINE
Payer: MEDICARE

## 2017-12-31 LAB
ANION GAP SERPL CALC-SCNC: 8 MMOL/L (ref 0–11.9)
BUN SERPL-MCNC: 45 MG/DL (ref 8–22)
CALCIUM SERPL-MCNC: 8.1 MG/DL (ref 8.5–10.5)
CHLORIDE SERPL-SCNC: 102 MMOL/L (ref 96–112)
CO2 SERPL-SCNC: 32 MMOL/L (ref 20–33)
CREAT SERPL-MCNC: 1.28 MG/DL (ref 0.5–1.4)
ERYTHROCYTE [DISTWIDTH] IN BLOOD BY AUTOMATED COUNT: 60 FL (ref 35.9–50)
GFR SERPL CREATININE-BSD FRML MDRD: 59 ML/MIN/1.73 M 2
GLUCOSE BLD-MCNC: 124 MG/DL (ref 65–99)
GLUCOSE BLD-MCNC: 163 MG/DL (ref 65–99)
GLUCOSE BLD-MCNC: 166 MG/DL (ref 65–99)
GLUCOSE BLD-MCNC: 167 MG/DL (ref 65–99)
GLUCOSE BLD-MCNC: 172 MG/DL (ref 65–99)
GLUCOSE SERPL-MCNC: 123 MG/DL (ref 65–99)
HCT VFR BLD AUTO: 28.8 % (ref 42–52)
HGB BLD-MCNC: 8.6 G/DL (ref 14–18)
MCH RBC QN AUTO: 24.2 PG (ref 27–33)
MCHC RBC AUTO-ENTMCNC: 29.9 G/DL (ref 33.7–35.3)
MCV RBC AUTO: 80.9 FL (ref 81.4–97.8)
PLATELET # BLD AUTO: 540 K/UL (ref 164–446)
PMV BLD AUTO: 10.9 FL (ref 9–12.9)
POTASSIUM SERPL-SCNC: 3.8 MMOL/L (ref 3.6–5.5)
POTASSIUM SERPL-SCNC: 3.8 MMOL/L (ref 3.6–5.5)
POTASSIUM SERPL-SCNC: 4 MMOL/L (ref 3.6–5.5)
POTASSIUM SERPL-SCNC: 4.3 MMOL/L (ref 3.6–5.5)
RBC # BLD AUTO: 3.56 M/UL (ref 4.7–6.1)
SODIUM SERPL-SCNC: 142 MMOL/L (ref 135–145)
WBC # BLD AUTO: 26.8 K/UL (ref 4.8–10.8)

## 2017-12-31 PROCEDURE — 700105 HCHG RX REV CODE 258: Performed by: INTERNAL MEDICINE

## 2017-12-31 PROCEDURE — 700111 HCHG RX REV CODE 636 W/ 250 OVERRIDE (IP): Performed by: INTERNAL MEDICINE

## 2017-12-31 PROCEDURE — 0B113F4 BYPASS TRACHEA TO CUTANEOUS WITH TRACHEOSTOMY DEVICE, PERCUTANEOUS APPROACH: ICD-10-PCS | Performed by: SURGERY

## 2017-12-31 PROCEDURE — 87205 SMEAR GRAM STAIN: CPT

## 2017-12-31 PROCEDURE — A9270 NON-COVERED ITEM OR SERVICE: HCPCS | Performed by: THORACIC SURGERY (CARDIOTHORACIC VASCULAR SURGERY)

## 2017-12-31 PROCEDURE — 700112 HCHG RX REV CODE 229: Performed by: CLINICAL NURSE SPECIALIST

## 2017-12-31 PROCEDURE — 700102 HCHG RX REV CODE 250 W/ 637 OVERRIDE(OP): Performed by: INTERNAL MEDICINE

## 2017-12-31 PROCEDURE — 71010 DX-CHEST-PORTABLE (1 VIEW): CPT

## 2017-12-31 PROCEDURE — 0B9F8ZX DRAINAGE OF RIGHT LOWER LUNG LOBE, VIA NATURAL OR ARTIFICIAL OPENING ENDOSCOPIC, DIAGNOSTIC: ICD-10-PCS | Performed by: INTERNAL MEDICINE

## 2017-12-31 PROCEDURE — 700102 HCHG RX REV CODE 250 W/ 637 OVERRIDE(OP): Performed by: CLINICAL NURSE SPECIALIST

## 2017-12-31 PROCEDURE — 700111 HCHG RX REV CODE 636 W/ 250 OVERRIDE (IP): Performed by: CLINICAL NURSE SPECIALIST

## 2017-12-31 PROCEDURE — 85027 COMPLETE CBC AUTOMATED: CPT

## 2017-12-31 PROCEDURE — 700105 HCHG RX REV CODE 258

## 2017-12-31 PROCEDURE — 700101 HCHG RX REV CODE 250: Performed by: INTERNAL MEDICINE

## 2017-12-31 PROCEDURE — 700105 HCHG RX REV CODE 258: Performed by: NURSE PRACTITIONER

## 2017-12-31 PROCEDURE — 94003 VENT MGMT INPAT SUBQ DAY: CPT

## 2017-12-31 PROCEDURE — A9270 NON-COVERED ITEM OR SERVICE: HCPCS | Performed by: CLINICAL NURSE SPECIALIST

## 2017-12-31 PROCEDURE — 84132 ASSAY OF SERUM POTASSIUM: CPT

## 2017-12-31 PROCEDURE — 87070 CULTURE OTHR SPECIMN AEROBIC: CPT

## 2017-12-31 PROCEDURE — 86140 C-REACTIVE PROTEIN: CPT

## 2017-12-31 PROCEDURE — 82962 GLUCOSE BLOOD TEST: CPT

## 2017-12-31 PROCEDURE — 700111 HCHG RX REV CODE 636 W/ 250 OVERRIDE (IP)

## 2017-12-31 PROCEDURE — 80048 BASIC METABOLIC PNL TOTAL CA: CPT

## 2017-12-31 PROCEDURE — 84134 ASSAY OF PREALBUMIN: CPT

## 2017-12-31 PROCEDURE — 700111 HCHG RX REV CODE 636 W/ 250 OVERRIDE (IP): Performed by: NURSE PRACTITIONER

## 2017-12-31 PROCEDURE — A9270 NON-COVERED ITEM OR SERVICE: HCPCS | Performed by: INTERNAL MEDICINE

## 2017-12-31 PROCEDURE — 302978 HCHG BRONCHOSCOPY-DIAGNOSTIC

## 2017-12-31 PROCEDURE — 700102 HCHG RX REV CODE 250 W/ 637 OVERRIDE(OP): Performed by: THORACIC SURGERY (CARDIOTHORACIC VASCULAR SURGERY)

## 2017-12-31 PROCEDURE — 770022 HCHG ROOM/CARE - ICU (200)

## 2017-12-31 RX ORDER — ROCURONIUM BROMIDE 10 MG/ML
100 INJECTION, SOLUTION INTRAVENOUS ONCE
Status: COMPLETED | OUTPATIENT
Start: 2017-12-31 | End: 2017-12-31

## 2017-12-31 RX ORDER — MIDAZOLAM HYDROCHLORIDE 1 MG/ML
INJECTION INTRAMUSCULAR; INTRAVENOUS
Status: COMPLETED
Start: 2017-12-31 | End: 2017-12-31

## 2017-12-31 RX ORDER — POTASSIUM CHLORIDE 7.45 MG/ML
10 INJECTION INTRAVENOUS ONCE
Status: COMPLETED | OUTPATIENT
Start: 2017-12-31 | End: 2017-12-31

## 2017-12-31 RX ORDER — MIDAZOLAM HYDROCHLORIDE 1 MG/ML
1-5 INJECTION INTRAMUSCULAR; INTRAVENOUS
Status: DISCONTINUED | OUTPATIENT
Start: 2017-12-31 | End: 2018-01-01

## 2017-12-31 RX ORDER — SODIUM CHLORIDE 9 MG/ML
INJECTION, SOLUTION INTRAVENOUS
Status: COMPLETED
Start: 2017-12-31 | End: 2017-12-31

## 2017-12-31 RX ADMIN — FAMOTIDINE 20 MG: 20 TABLET, FILM COATED ORAL at 09:00

## 2017-12-31 RX ADMIN — ROCURONIUM BROMIDE 100 MG: 10 INJECTION, SOLUTION INTRAVENOUS at 10:35

## 2017-12-31 RX ADMIN — ENOXAPARIN SODIUM 150 MG: 150 INJECTION SUBCUTANEOUS at 00:36

## 2017-12-31 RX ADMIN — INSULIN HUMAN 5 UNITS: 100 INJECTION, SUSPENSION SUBCUTANEOUS at 15:02

## 2017-12-31 RX ADMIN — ASPIRIN 81 MG: 81 TABLET, CHEWABLE ORAL at 08:20

## 2017-12-31 RX ADMIN — AMIODARONE HYDROCHLORIDE 150 MG: 1.5 INJECTION, SOLUTION INTRAVENOUS at 18:51

## 2017-12-31 RX ADMIN — PROPOFOL 25 MCG/KG/MIN: 10 INJECTION, EMULSION INTRAVENOUS at 19:05

## 2017-12-31 RX ADMIN — AMIODARONE HYDROCHLORIDE 0.5 MG/MIN: 50 INJECTION, SOLUTION INTRAVENOUS at 23:52

## 2017-12-31 RX ADMIN — CEFAZOLIN SODIUM 2 G: 2 INJECTION, SOLUTION INTRAVENOUS at 22:30

## 2017-12-31 RX ADMIN — LACTULOSE 30 ML: 20 SOLUTION ORAL at 21:42

## 2017-12-31 RX ADMIN — POTASSIUM BICARBONATE 50 MEQ: 25 TABLET, EFFERVESCENT ORAL at 18:14

## 2017-12-31 RX ADMIN — PROPOFOL 20 MCG/KG/MIN: 10 INJECTION, EMULSION INTRAVENOUS at 22:30

## 2017-12-31 RX ADMIN — Medication 50 MCG/HR: at 09:29

## 2017-12-31 RX ADMIN — MIDAZOLAM 5 MG: 1 INJECTION INTRAMUSCULAR; INTRAVENOUS at 10:35

## 2017-12-31 RX ADMIN — POTASSIUM CHLORIDE 10 MEQ: 7.46 INJECTION, SOLUTION INTRAVENOUS at 12:58

## 2017-12-31 RX ADMIN — FAMOTIDINE 20 MG: 20 TABLET, FILM COATED ORAL at 21:44

## 2017-12-31 RX ADMIN — POTASSIUM CHLORIDE 10 MEQ: 7.46 INJECTION, SOLUTION INTRAVENOUS at 21:42

## 2017-12-31 RX ADMIN — FUROSEMIDE 10 MG/HR: 10 INJECTION, SOLUTION INTRAMUSCULAR; INTRAVENOUS at 23:40

## 2017-12-31 RX ADMIN — DOCUSATE SODIUM 100 MG: 50 LIQUID ORAL at 08:22

## 2017-12-31 RX ADMIN — CEFAZOLIN SODIUM 2 G: 2 INJECTION, SOLUTION INTRAVENOUS at 14:53

## 2017-12-31 RX ADMIN — POTASSIUM BICARBONATE 50 MEQ: 25 TABLET, EFFERVESCENT ORAL at 05:22

## 2017-12-31 RX ADMIN — PROPOFOL 25 MCG/KG/MIN: 10 INJECTION, EMULSION INTRAVENOUS at 00:05

## 2017-12-31 RX ADMIN — POTASSIUM BICARBONATE 50 MEQ: 25 TABLET, EFFERVESCENT ORAL at 12:37

## 2017-12-31 RX ADMIN — DIPHENHYDRAMINE HCL 25 MG: 25 TABLET ORAL at 22:12

## 2017-12-31 RX ADMIN — ROSUVASTATIN CALCIUM 20 MG: 20 TABLET, FILM COATED ORAL at 21:44

## 2017-12-31 RX ADMIN — AMIODARONE HYDROCHLORIDE 0.5 MG/MIN: 50 INJECTION, SOLUTION INTRAVENOUS at 08:22

## 2017-12-31 RX ADMIN — CHLORHEXIDINE GLUCONATE 15 ML: 1.2 RINSE ORAL at 08:21

## 2017-12-31 RX ADMIN — NYSTATIN 500000 UNITS: 100000 SUSPENSION ORAL at 08:20

## 2017-12-31 RX ADMIN — AMIODARONE HYDROCHLORIDE 400 MG: 200 TABLET ORAL at 08:20

## 2017-12-31 RX ADMIN — CEFAZOLIN SODIUM 2 G: 2 INJECTION, SOLUTION INTRAVENOUS at 05:22

## 2017-12-31 RX ADMIN — POTASSIUM CHLORIDE 10 MEQ: 7.46 INJECTION, SOLUTION INTRAVENOUS at 08:21

## 2017-12-31 RX ADMIN — FENTANYL CITRATE 100 MCG: 50 INJECTION INTRAMUSCULAR; INTRAVENOUS at 10:35

## 2017-12-31 RX ADMIN — STANDARDIZED SENNA CONCENTRATE AND DOCUSATE SODIUM 1 TABLET: 8.6; 5 TABLET, FILM COATED ORAL at 21:44

## 2017-12-31 RX ADMIN — CHLORHEXIDINE GLUCONATE 15 ML: 1.2 RINSE ORAL at 21:42

## 2017-12-31 RX ADMIN — FUROSEMIDE 10 MG/HR: 10 INJECTION, SOLUTION INTRAMUSCULAR; INTRAVENOUS at 10:00

## 2017-12-31 RX ADMIN — PROPOFOL 25 MCG/KG/MIN: 10 INJECTION, EMULSION INTRAVENOUS at 14:25

## 2017-12-31 RX ADMIN — PROPOFOL 15 MCG/KG/MIN: 10 INJECTION, EMULSION INTRAVENOUS at 08:25

## 2017-12-31 RX ADMIN — INSULIN HUMAN 5 UNITS: 100 INJECTION, SUSPENSION SUBCUTANEOUS at 05:22

## 2017-12-31 RX ADMIN — INSULIN HUMAN 5 UNITS: 100 INJECTION, SUSPENSION SUBCUTANEOUS at 22:08

## 2017-12-31 RX ADMIN — AMIODARONE HYDROCHLORIDE 400 MG: 200 TABLET ORAL at 21:44

## 2017-12-31 RX ADMIN — POTASSIUM BICARBONATE 50 MEQ: 25 TABLET, EFFERVESCENT ORAL at 00:35

## 2017-12-31 RX ADMIN — SODIUM CHLORIDE 500 ML: 9 INJECTION, SOLUTION INTRAVENOUS at 02:50

## 2017-12-31 NOTE — CONSULTS
DATE OF CONSULTATION: 12/31/2017     REFERRING PHYSICIAN: Amrik Banegas MD.     CONSULTING PHYSICIAN: Aguilar Tobar MD     REASON FOR CONSULTATION: Percutaneous tracheostomy.    I have been asked by  to see the patient in surgical consultation for evaluation of tracheostomy to facilitate ventilator weaning.    HISTORY OF PRESENT ILLNESS: The patient is a 52 y.o. male with CHF exacerbation and recent CABG. Current efforts at ventilator weaning have been unsuccessful. Percutaneous tracheostomy is requested to facilitate ventilator weaning and overall pulmonary care.    PAST MEDICAL HISTORY:  has a past medical history of ASTHMA; Chronic obstructive pulmonary disease (CMS-[facility]); and Diabetes.     PAST SURGICAL HISTORY:  has a past surgical history that includes other abdominal surgery; multiple coronary artery bypass endo vein harvest (12/22/2017); and oscar (12/22/2017).     ALLERGIES: Erythromycin; Cillins [penicillins]; and Shellfish allergy     CURRENT MEDICATIONS:   Home Medications     Reviewed by Katie Aly R.N. (Registered Nurse) on 12/22/17 at 0707  Med List Status: Complete   Medication Last Dose Status   albuterol (PROVENTIL) 2.5mg/0.5ml Nebu Soln 11/18/2017 Active   albuterol inhaler 2 Puff  Active   albuterol inhaler 2 Puff  Active   alprazolam (XANAX) tablet 0.25 mg  Active   aminocaproic acid (AMICAR) 10 g in  mL IV Bolus  Active   aminocaproic acid (AMICAR) 5 g in  mL Infusion  Active   carvedilol (COREG) tablet 6.25 mg  Active   dexmedetomidine (PRECEDEX) 400 mcg/100mL premix infusion  Active   enoxaparin (LOVENOX) inj 40 mg  Active   epinephrine 1 mg/mL(1:1000) 8 mg in  mL Infusion  Active   furosemide (LASIX) 20 MG Tab 12/19/2017 Active   furosemide (LASIX) injection 40 mg  Active   insulin regular (HUMULIN R) injection 2-15 Units  Active   insulin regular human (HUMULIN/NOVOLIN R) 62.5 Units in  mL infusion per protocol  Active   ipratropium-albuterol  (DUONEB) nebulizer solution 3 mL  Active   lisinopril (PRINIVIL) tablet 5 mg  Active   potassium chloride ER (KLOR-CON 10) 10 MEQ tablet 12/19/2017 Active   potassium chloride SA (Kdur) tablet 20 mEq  Active   Respiratory Care per Protocol  Active   Respiratory Care per Protocol  Active   rosuvastatin (CRESTOR) tablet 20 mg  Active   vancomycin 2000 mg/250mL IVPB premix 2,000 mg  Active                FAMILY HISTORY: family history is not on file.    SOCIAL HISTORY:  reports that he has been smoking Cigarettes.  He has a 10.00 pack-year smoking history. He has never used smokeless tobacco. He reports that he does not drink alcohol or use drugs.    REVIEW OF SYSTEMS: Unable to be elicited secondary to mechanical ventilation.    PHYSICAL EXAMINATION:   GENERAL:  Intubated and sedated, in no apparent distress.  Morbidly obese.  Appears much older than stated age.  HEENT:  Atraumatic, normocephalic.  Normal pinna bilaterally.  External auditory canals are without discharge.  Conjunctivae and sclerae are clear. Extraocular movements are full. Pupils are equal, round, and reactive to light.  Oral mucosa is moist.  Midface appears normal, no appreciable malocclusion of bite.  NECK:  Soft and supple without lymphadenopathy. No masses are noted.  Thyroid is of normal size and texture.  Trachea is midline.  CHEST:  Lungs are clear to auscultation bilaterally.  No masses, lesions, or signs of trauma were noted.    CARDIOVASCULAR:  Regular rate and rhythm.  No murmurs appreciated.  No JVD.  Palpable pulses present in all four extremities.    ABDOMEN:  Soft, non-tender, non-distended.  Non-tympanitic.  No hepatomegaly or splenomegaly.  No incisional, umbilical, or inguinal hernias were appreciated.  GENITOURINARY: Normal external reproductive anatomy.  LYMPHATIC:  There is no adenopathy in the cervical, supraclavicular, infraclavicular, axillary or inguinal regions.  SKIN:  Warm and well perfused. No rashes.  NEUROLOGIC:   Intubated and sedated.  PSYCHIATRIC: Unable to fully assess due to patient condition.    LABORATORY VALUES:   Recent Labs      12/29/17   0537  12/30/17   0407  12/31/17   0500   WBC  30.4*  27.6*  26.8*   RBC  3.57*  3.42*  3.56*   HEMOGLOBIN  8.5*  8.4*  8.6*   HEMATOCRIT  28.6*  28.0*  28.8*   MCV  80.1*  81.9  80.9*   MCH  23.8*  24.6*  24.2*   MCHC  29.7*  30.0*  29.9*   RDW  60.3*  61.2*  60.0*   PLATELETCT  421  485*  540*   MPV  10.8  10.8  10.9     Recent Labs      12/29/17   0537   12/30/17   0407   12/30/17   1733  12/31/17   0030  12/31/17   0500   SODIUM  143   --   141   --    --    --   142   POTASSIUM  3.8   < >  4.6   < >  3.7  4.3  3.8   CHLORIDE  108   --   104   --    --    --   102   CO2  26   --   26   --    --    --   32   GLUCOSE  166*   --   175*   --    --    --   123*   BUN  43*   --   44*   --    --    --   45*   CREATININE  1.27   --   1.14   --    --    --   1.28   CALCIUM  8.0*   --   7.8*   --    --    --   8.1*    < > = values in this interval not displayed.     Recent Labs      12/28/17   1710  12/29/17   0033  12/29/17   0148  12/29/17   0845   APTT  33.7  33.7  34.6   --    INR   --    --    --   1.29*        IMAGING:   DX-CHEST-PORTABLE (1 VIEW)   Final Result         1.  Pulmonary edema and/or infiltrates are identified, which are stable since the prior exam.   2.  Cardiomegaly      DX-CHEST-PORTABLE (1 VIEW)   Final Result         1.  Pulmonary edema and/or infiltrates are identified, which are stable since the prior exam.   2.  Cardiomegaly      US-CHEST   Final Result      No safe area for thoracentesis.      DX-CHEST-PORTABLE (1 VIEW)   Final Result      No significant change from prior exam.      UE VENOUS DUPLEX ([facility] [place] [facility])   Final Result      DX-CHEST-PORTABLE (1 VIEW)   Final Result      1.  Worsening bibasilar atelectasis or infiltrate.   2.  No other significant change from prior exam.         DX-CHEST-PORTABLE (1 VIEW)   Final Result      1.   Stable cardiomegaly and pulmonary edema.      2.  Stable bilateral pleural effusions and bibasilar atelectasis.      3.  Stable cardiomegaly.      DX-CHEST-PORTABLE (1 VIEW)   Final Result      1.  Left subclavian pulmonary arterial catheter removal. Left subclavian catheter tip is poorly visualized, but appears to project over the left brachiocephalic vein.      2.  No significant change in pulmonary vascular congestion and bibasilar opacification.      3.  Stable cardiomegaly      DX-CHEST-PORTABLE (1 VIEW)   Final Result      Stable chest with moderate pulmonary edema, basilar atelectasis and probable layering pleural fluid      DX-CHEST-PORTABLE (1 VIEW)   Final Result      Stable chest with moderate pulmonary edema, basilar atelectasis and probable layering pleural fluid      DX-CHEST-PORTABLE (1 VIEW)   Final Result      1.  Worsening in bibasilar opacification, possibly atelectasis. Developing pneumonia cannot be excluded.      2.  Stable cardiomegaly.      3.  Interstitial edema appear similar to the prior exam.      DX-ABDOMEN FOR TUBE PLACEMENT   Final Result      Feeding tube placement with the tip projecting over the gastric fundus      DX-CHEST-LIMITED (1 VIEW)   Final Result      1.  No significant change in interstitial edema.      2.  Interval improvement in right basilar atelectasis.      3.  Stable cardiomegaly.      DX-CHEST-PORTABLE (1 VIEW)   Final Result      1.  Worsening interstitial edema.      2.  New right basilar opacification, likely atelectasis. Developing pneumonia or small layering pleural effusion cannot be excluded.      DX-CHEST-PORTABLE (1 VIEW)   Final Result      No significant change from prior exam.         TRANSESOPHAGEAL ECHO W/O CONT         DX-CHEST-PORTABLE (1 VIEW)   Final Result      1.  Stable cardiomegaly.      2.  Interstitial edema.      3.  Left internal jugular pulmonary arterial catheter with the tip projecting over the right main pulmonary artery. No  pneumothorax is identified.      DX-CHEST-2 VIEWS   Final Result      1.  Prominence of interstitium suggesting pulmonary edema.   2.  Stable multichamber cardiac enlargement.   3.  No pneumonia or pneumothorax.      LE VEIN MAPPING ([facility] [place] [facility])   Final Result      LE VEIN MAPPING ([facility] [place] [facility])             IMPRESSION AND PLAN: Acute respiratory failure.    Plan percutaneous tracheostomy under bronchoscopic guidance. The patient was seen and examined. The operative strategy was explained and reviewed. Alternatives discussed. Potential complications including, but not limited to, infection, bleeding, damage to adjacent structures, and death were discussed. Questions were elicited and answered. Operative consent obtained.    ____________________________________   MD JAMEEL Mcintyre / YOSVANY   DD: 12/31/2017  11:03 AM

## 2017-12-31 NOTE — PROGRESS NOTES
Per order, RN removed staples and SERGE drain. Pt tolerated well, minimal drainage. Dressing in place over SERGE drain.

## 2017-12-31 NOTE — CARE PLAN
Problem: Post Op Day 4 CABG/Heart Valve Replacement  Goal: Optimal care of the Post Op CABG/Heart Valve replacement Post Op Day 4    Intervention: Daily Weights  Weigh recorded daily  Intervention: Shower daily and clean incisions twice daily with soap and water  Incision care done daily - covered with island dressing while intubated      Problem: Hemodynamic Status  Goal: Stable hemodynamics, hemostasis, fluid/electrolyte balance and rhythm control  Pt in A fib/flutter, heart rate 120's.  Attempting to wean Epinephrine gtt.

## 2017-12-31 NOTE — CARE PLAN
Problem: Ventilation Defect:  Goal: Ability to achieve and maintain unassisted ventilation or tolerate decreased levels of ventilator support  Outcome: PROGRESSING AS EXPECTED  Adult Ventilation Update    Total Vent Days: 9    Patient Lines/Drains/Airways Status    Active Airway     Name: Placement date: Placement time: Site: Days:    Airway Group ET Tube Oral 9.0 12/22/17   0753   Oral   8            No SBT    Plateau Pressure (Q Shift): 19 (12/30/17 0714)  Static Compliance (ml / cm H2O): 68 (12/30/17 1437)    Patient failed trials because of Barriers to Wean: Other (Comments) (12/30/17 0714)  Barriers to SBT    Length of Weaning Trial      Cough: Productive (12/30/17 1437)  Sputum Amount: Small (12/30/17 1437)  Sputum Color: Tan;White (12/30/17 1437)  Sputum Consistency: Thin;Thick (12/30/17 1437)    Mobility Group  Activity Performed: Unable to mobilize (12/30/17 0714)  Time Activity Tolerated: 5 min (12/29/17 2000)  Distance Per Occurrence (ft.): 200 feet (12/21/17 2030)  # of Times Distance was Traveled: 2 (12/21/17 2030)  Assistance / Tolerance: Assistance of Two or More;Tolerates Well (12/29/17 2000)  Pt Calls for Assistance: No (12/30/17 0800)  Staff Present for Mobilization: RN (12/29/17 2000)  Gait: Steady (12/21/17 2030)  Assistive Devices: None (12/21/17 2030)  Reason Not Mobilized: Unstable condition (12/29/17 1941)  Mobilization Comments: pt on epinephrine drip, tachycardic (12/29/17 1600)    Events/Summary/Plan: vent check (12/30/17 1437)

## 2017-12-31 NOTE — CARE PLAN
Problem: Ventilation Defect:  Goal: Ability to achieve and maintain unassisted ventilation or tolerate decreased levels of ventilator support  Outcome: PROGRESSING AS EXPECTED    Intervention: Support and monitor invasive and noninvasive mechanical ventilation  Adult Ventilation Update    Total Vent Days: 9    Patient Lines/Drains/Airways Status    Active Airway     Name: Placement date: Placement time: Site: Days:    Airway Group ET Tube Oral 9.0 12/22/17   0753   Oral   9              Vent settings: 26 540 8 40%    Sputum/Suction   Cough: Productive;Strong (12/31/17 0400)  Sputum Amount: Small (12/31/17 0400)  Sputum Color: White (12/31/17 0400)  Sputum Consistency: Thick (12/31/17 0400)      Events/Summary/Plan: Pt stable on vent, no changes made.

## 2017-12-31 NOTE — PROGRESS NOTES
"Date of Service: 12/31/2017  Chief Complaint:No chief complaint on file.  53 yo gentleman with history of CAD s/p recent CABG now POD #8, complicated by PEA arrest on POD #1, respiratory failure, rising white count and thick secretions. Had been seen by Dr. Goodwin initially and signed off on the 21st. He went back into AF with RVR 12/29/2017 evening with rates in the 120s  Interval History:  On Vent  Amiodarone gtt; 2 pressors and sedated; Barely adequate for BP  Monitor showed AFlutter 2:1 with PVC's rate 100-130  All recent medication, labs, imaging studies and procedures reviewed    Physical Exam   Blood pressure (!) 195/113, pulse (!) 121, temperature 37.9 °C (100.2 °F), resp. rate (!) 27, height 1.956 m (6' 5.01\"), weight (!) 154.1 kg (339 lb 11.7 oz), SpO2 92 %.    Constitutional:  He appears well-developed.   HENT: Normocephalic and atraumatic. No scleral icterus.   Neck: No JVD present.   Cardiovascular: Fast rate. IRR, Exam reveals no gallop and no friction rub. No murmur heard.   Pulmonary/Chest: CTAB coarse   Abdominal: S/NT/ND BS+   Musculoskeletal: He exhibits no edema. Pulses present.  Skin: Skin is warm and dry.       Intake/Output Summary (Last 24 hours) at 12/31/17 0858  Last data filed at 12/31/17 0600   Gross per 24 hour   Intake          4499.49 ml   Output             6175 ml   Net         -1675.51 ml       LABS:  Lab Results   Component Value Date/Time    TRIGLYCERIDE 173 (H) 12/30/2017 12:25 AM       Lab Results   Component Value Date/Time    WBC 26.8 (H) 12/31/2017 05:00 AM    RBC 3.56 (L) 12/31/2017 05:00 AM    HEMOGLOBIN 8.6 (L) 12/31/2017 05:00 AM    HEMATOCRIT 28.8 (L) 12/31/2017 05:00 AM    MCV 80.9 (L) 12/31/2017 05:00 AM    NEUTSPOLYS 83.50 (H) 12/23/2017 10:35 AM    LYMPHOCYTES 2.60 (L) 12/23/2017 10:35 AM    MONOCYTES 5.20 12/23/2017 10:35 AM    EOSINOPHILS 0.00 12/23/2017 10:35 AM    BASOPHILS 0.00 12/23/2017 10:35 AM    HYPOCHROMIA 1+ 12/23/2017 10:35 AM    ANISOCYTOSIS 1+ " 12/23/2017 10:35 AM     Lab Results   Component Value Date/Time    SODIUM 142 12/31/2017 05:00 AM    POTASSIUM 3.8 12/31/2017 05:00 AM    CHLORIDE 102 12/31/2017 05:00 AM    CO2 32 12/31/2017 05:00 AM    GLUCOSE 123 (H) 12/31/2017 05:00 AM    BUN 45 (H) 12/31/2017 05:00 AM    CREATININE 1.28 12/31/2017 05:00 AM     Lab Results   Component Value Date    HBA1C 6.8 (H) 12/22/2017      Lab Results   Component Value Date/Time    ALKPHOSPHAT 90 12/23/2017 10:35 AM    ASTSGOT 38 12/23/2017 10:35 AM    ALTSGPT 10 12/23/2017 10:35 AM    TBILIRUBIN 0.9 12/23/2017 10:35 AM      Lab Results   Component Value Date/Time    BNPBTYPENAT 787 (H) 11/24/2017 02:45 AM      No results found for: TSH  Lab Results   Component Value Date/Time    PROTHROMBTM 15.8 (H) 12/29/2017 08:45 AM    INR 1.29 (H) 12/29/2017 08:45 AM        Medications reviewed    Imaging reviewed    ECHO(11/2017):TDS - [facility]  No prior study is available for comparison.   Normal left ventricular chamber size.  Mild concentric left ventricular hypertrophy.  Left ventricular ejection fraction is visually estimated to be 35%.  Global hypokinesis with regional variation.  Severely dilated right ventricle.  Reduced right ventricular systolic function.  Mildly dilated left atrium.  Enlarged right atrium.  Possible low grade, low flow stenosis.  Mild mitral regurgitation.  Mild tricuspid regurgitation.  Estimated right ventricular systolic pressure  is 35 mmHg.  Mild pulmonic insufficiency.  Normal pericardium without effusion.  Ascending aorta diameter is 3.6 cm.    Impressions:  1)AF with RVR  2)CAD s/p CABG  3)Systolic CHF 2/2 ICM  4)Respiratory failure  5)S/p PEA arrest  6)Pneumonia  7)DVT    Recommendations:  Amiodarone gtt  Rate control  Cardioversion is an option  Pressor support  Case discussed with attending/RN  Will follow  Thx

## 2017-12-31 NOTE — PROGRESS NOTES
Monitor summary:  A fib/flutter rate   --/.12/--    Shift summary:  Epinephrine was weaned off for about 2 hours then had to be restarted for hypotension.  Propofol remains at low dose - pt appears restful.

## 2017-12-31 NOTE — PROGRESS NOTES
Attempting to wean Epinephrine.  Propofol site looked infiltrated.  Pt more sedate once Propofol moved to another site.  Chest tubes removed after speaking with KAYLIE Antonio.  Pt tolerated CT removal well.  Sites draining moderate amount serous drainage.  Pt able to site at edge of bed with assist of 5 - was unable to support himself but was able to hold his head up.

## 2017-12-31 NOTE — PROCEDURES
DATE OF PROCEDURE: 12/31/2017     INDICATION: Failure to wean from mechanical ventilation, respiratory failure.    PROCEDURE PERFORMED: Percutaneous tracheostomy under bronchoscopic guidance    PERFORMED BY: Aguilar Tobar MD    ASSISTANT AND ENDOSCOPIST: Amrik Banegas MD    ANESTHESIA: Intravenous sedation and chemical paralysis.     PROCEDURE: Following informed consent, the patient was properly identified and optimally positioned in bed. Continuous hemodynamic and oxygen saturation monitoring was employed through out the performance of the procedure.  The patient was preoxygenated with 100% oxygen and placed on a set ventilatory rate. Intravenous sedation and paralytic were administered to achieve adequate sedation.    The fiberoptic bronchoscope was advance through the endotracheal tube. The endotracheal tube was repositioned just above the vocal cords under direct vision with the assistance of the respiratory therapist. Satisfactory ventilation and delivered tidal volumes were confirmed. The anterior neck was transilluminated at the surface landmark site for the tracheostomy. Please see Dr Banegas's dictation for full details of the bronchoscopy.    The anterior neck was prepped. It was confirmed that the bronchoscope was withdrawn back into the endotracheal tube. A Portex® ULTRAperc® Single Stage Dilator Technique Kit was used for the procedure. The trachea was cannulated in the midline with a 14 gauge angiocatheter midway between the thyroid cartilage and suprasternal notch. A flexible guidewire was passed without resistance. The scope was advanced distally and satisfactory cannulation and wire placement was confirmed.  A #8 Blue Line Ultra® Suctionaid tracheostomy tube was passed using Selldinger technique and secured to the skin with sutures and a tracheostomy collar. Post-procedural bronchoscopy was performed and confirmed placement of the tracheostomy within the trachea.    The patient tolerated the  procedure well. There were no apparent complications.    ____________________________________   Aguilar JORGENSEN / YOSVANY   DD: 12/31/2017  11:05 AM

## 2017-12-31 NOTE — PROGRESS NOTES
Cardiovascular Surgery Progress Note    Name: Joshua Medrano  MRN: [id number]  : 1965  Admit Date: 2017 10:21 AM  Procedure:  Procedure(s) and Anesthesia Type:     * MULTIPLE CORONARY ARTERY BYPASS ENDO VEIN HARVEST X2 - General     * JIM - General  9 Day Post-Op    Vitals:  Patient Vitals for the past 8 hrs:   Temp SpO2 Pulse Heart Rate (Monitored) Resp NIBP   17 1213 - 93 % - (!) 126 - -   17 1100 - 100 % (!) 126 (!) 126 (!) 27 -   17 0900 - - (!) 118 (!) 123 (!) 26 (!) 90/59   17 0800 - - (!) 121 (!) 122 (!) 26 (!) 88/50   17 0700 - - (!) 121 (!) 121 (!) 26 (!) 88/59   17 0645 - 92 % (!) 121 (!) 120 (!) 27 (!) 86/55   17 0630 - 93 % (!) 122 (!) 121 (!) 26 (!) 78/50   17 0623 - 91 % - (!) 122 - -   17 0615 - 92 % (!) 121 (!) 121 (!) 26 (!) 77/47   17 0600 37.9 °C (100.2 °F) 91 % (!) 121 (!) 121 (!) 32 (!) 87/53     Temp (24hrs), Av.9 °C (100.2 °F), Min:37.2 °C (99 °F), Max:38.2 °C (100.8 °F)      Respiratory:  Leo Vent Mode: APVCMV, Rate (breaths/min): 26, PEEP/CPAP: 8, FiO2: 40, P Peak (PIP): 26, P MEAN: 14 Respiration: (!) 27, Pulse Oximetry: 93 %     Chest Tube Drains:  Francisco Javier Girard 1: 33 ml       Fluids:    Intake/Output Summary (Last 24 hours) at 17 1353  Last data filed at 17 1200   Gross per 24 hour   Intake          4395.18 ml   Output             6025 ml   Net         -1629.82 ml     Admit weight: Weight: (!) 159.2 kg (350 lb 15.6 oz)  Current weight: Weight: (!) 154.1 kg (339 lb 11.7 oz) (17 0330)    Labs:  Recent Labs      17   0537  17   0407  17   0500   WBC  30.4*  27.6*  26.8*   RBC  3.57*  3.42*  3.56*   HEMOGLOBIN  8.5*  8.4*  8.6*   HEMATOCRIT  28.6*  28.0*  28.8*   MCV  80.1*  81.9  80.9*   MCH  23.8*  24.6*  24.2*   MCHC  29.7*  30.0*  29.9*   RDW  60.3*  61.2*  60.0*   PLATELETCT  421  485*  540*   MPV  10.8  10.8  10.9         Recent Labs      17   0537   17    0407   12/31/17   0030  12/31/17   0500  12/31/17   1200   SODIUM  143   --   141   --    --   142   --    POTASSIUM  3.8   < >  4.6   < >  4.3  3.8  3.8   CHLORIDE  108   --   104   --    --   102   --    CO2  26   --   26   --    --   32   --    GLUCOSE  166*   --   175*   --    --   123*   --    BUN  43*   --   44*   --    --   45*   --    CREATININE  1.27   --   1.14   --    --   1.28   --    CALCIUM  8.0*   --   7.8*   --    --   8.1*   --     < > = values in this interval not displayed.     Recent Labs      12/28/17   1710  12/29/17   0033  12/29/17   0148  12/29/17   0845   APTT  33.7  33.7  34.6   --    INR   --    --    --   1.29*       Medications:  • potassium chloride (KCL-CENTRAL) IV *Administer in ICU only*  10 mEq     • amiodarone  400 mg     • docusate sodium 100mg/10mL  100 mg      Or   • docusate sodium  100 mg     • enoxaparin (LOVENOX) injection  1 mg/kg     • ceFAZolin  2 g     • potassium bicarbonate  50 mEq     • aspirin  81 mg     • K+ Scale: Goal of 4.5  1 Each     • insulin NPH  5 Units     • famotidine  20 mg     • chlorhexidine  15 mL     • insulin lispro  0-15 Units     • senna-docusate  1 Tab     • rosuvastatin  20 mg         Exam:   Review of Systems   Unable to perform ROS: Intubated       Physical Exam   Constitutional: No distress. He is intubated.   Morbidly obese   Neck: Neck supple.   Cardiovascular: Regular rhythm.  Tachycardia present.  Exam reveals no gallop.    No murmur heard.  Pulmonary/Chest: Effort normal. He is intubated. No respiratory distress. He has decreased breath sounds in the right lower field and the left lower field.   Abdominal: Soft. He exhibits no distension.   Musculoskeletal: He exhibits edema.   Neurological:   sedated   Skin: Skin is warm.   Psychiatric:   JUAREZ       Quality Measures:     Reviewed items::  EKG reviewed, Labs reviewed, Medications reviewed and Radiology images reviewed  Ceballos catheter::  One or Two Days Post Surgery (Day of Surgery being  Day 0) and Critically Ill - Requiring Accurate Measurement of Urinary Output  Central line in place:  Need for access and Vasopressors  DVT prophylaxis pharmacological::  Contraindicated - High bleeding risk  DVT prophylaxis - mechanical:  SCDs  Ulcer Prophylaxis::  Yes      Assessment/Plan:  POD 1 - HOTN- IABP 1:2, epi/jazz, vent- peep inc this am, keep CT/jenkins, CPM  POD 2 HOTN - epi/jazz- IABP 1:2, Vent - pulm following, s/p PEA arrest yesterday- bronch with lots of thick secretions, sedated, CPM  POD 3 - HOTN - epi/jazz- decreased from yesterday, IABP 1:2, Vent - CMV 26, PEEP 10, sedated, diurese well yesterday- start lasix gtt today, CPM  POD 4 NSR, Hotn-epi/jazz, down slightly form yesterday.  IABP 1:2 no change in pressures with pump on standby--change to 1:4.  On lasix gtt, diuresing well.  FiO2 down to 40%, PEEP 10.  Continue lasix gtt.  Will plan on removing IABP today. CPM.   POD 5 HDS, SB- d/c amio, epi/jazz- weaning, Vent- per pulm, sedated when awake follows per RN, lasix gtt- cont, CPM  POD 6 HDS, NSR, epi/jazz--weaning.  On lasix gtt.  WBCs up, bronch yesterday now on ABX.  Mod r effusion.  Will tap.  Keep SERGE drain L thigh one more day.Keep CTs while vented.  CPM.  POD 7 HDS, on low dose epi, off jazz.  NSR. RUE thrombus started on heparin gtt--held this AM for thoracentesis.  Vent 40%/peep 8.  Sedation weaning.  PLAN:  DC temp wires, resume anticoagulation after thoracentesis.  Vent per pulm. Keep CTs today.  POD 8 Remains on epi, no change.  Back in afib yesterday, rebolused vinnyo, now AT  a flutter?  Cardiology to address. On lovenox.  CXR remains with edema/effusions, unable to perform thoracentesis yesterday. Keep lasix gtt today and k-scale.   Agitated, kept on sedation.  Vent per PMA.  May need to move toward trach next few days.  Plan to hold lovenox in AM to DC CTs and SERGE drain.  DC prevena, DC staples EVH site. CPM.  POD 9 Epi/jazz/lasix, ST, VDRF, sedated.  Lasix gtt.  PLAN: Vent per PMA,   Lovenox held for trach likely today.  ROBERTA VALENTINE drain.  CPM.       Active Hospital Problems    Diagnosis   • CAD (coronary artery disease) [I25.10]   • CHF (congestive heart failure) (CMS-HCC) [I50.9]

## 2017-12-31 NOTE — CARE PLAN
Problem: Post Op Day 4 CABG/Heart Valve Replacement  Goal: Optimal care of the Post Op CABG/Heart Valve replacement Post Op Day 4    Intervention: Daily Weights  Completed  Intervention: Shower daily and clean incisions twice daily with soap and water  Wound vac removed per KAYLIE Antonio. Cleaned with soap and water  Intervention: Up in chair for all meals  Pt intubated  Intervention: Ambulate, increasing the distance each time x 3 and before bed  Intubated and on sedation  Intervention: IS q 1 hour while awake and record best IS volume  Intubed  Intervention: Consider removal of jenkins, chest tube and pacer wire if not already done  Jenkins and chest tube in place

## 2017-12-31 NOTE — PROGRESS NOTES
Epinephrine has been stopped.  SBP low 90's, MAP greater than 65.  Tube feeding stopped at 0200 for possible trach today.

## 2017-12-31 NOTE — PROGRESS NOTES
Pulmonary Critical Care Progress Note      DOS:  12/31/2017    Chief Complaint: CAD, 2V CABG    History of Present Illness: 51 y/o M, h/o DM, COPD, DLD, CAD, SHASHANK, Obesity; underwent 2v CABG 12/22    Interval Events:  24 hour interval history reviewed    - AF/flutter, , amio .5   - epi, jazz gtts   - lasix gtt   - I/O 4.7/6.5   - vent day 9, min secretions   - cxr unchagned   - CTs out     - Lovenox 150 bid, held for trach    - day 5 of 10 Ancef for MSSA, kleb BAL   - Cr up 1.28   - perc trach today  Yesterday   - POD8   - arouses, follows, restless, prop 60   - AF/flutter, 120's   - amiodarone, gtt .5   - epi gtt, jazz gtt   - lasix gtt 10/hr   - williams TF at 80   - good UOP, I/O 5.5/7.8   - Tm 38.3   - full dose lovenox   - RUE DVT   - vent day 9, 8, 40%; no SBT, cxr edema, atx unchanged   - ancef day 2 for kleb/mssa pna   - will need trach  :  Review of Systems   Unable to perform ROS: Acuity of condition       Physical Exam   Constitutional: He appears well-developed and well-nourished.   HENT:   Head: Normocephalic.   Eyes: Pupils are equal, round, and reactive to light. No scleral icterus.   Neck: No tracheal deviation present.   Cardiovascular: Normal rate.    Pulmonary/Chest:   Diminished, scattered coarse crackles, no wheezes   Abdominal: Soft. He exhibits no distension.   Musculoskeletal:   Trace edema   Neurological: No cranial nerve deficit.   [place], now following commands, moves all extremities   Skin: Skin is warm and dry.   Psychiatric:   sedate   Nursing note and vitals reviewed.        PFSH:  No change.    Respiratory:  Leo Vent Mode: APVCMV, Rate (breaths/min): 26, Vt Target (mL): 540, PEEP/CPAP: 8, FiO2: 40, Static Compliance (ml / cm H2O): 54, Control VTE (exp VT): 571  Pulse Oximetry: 92 %  Chest Tube Drains:  Francisco Javier Girard 1: 33 ml         Recent Labs      12/29/17   0750  12/30/17   0501   ISTATAPH  7.516*  7.557*   ISTATAPCO2  32.7  33.0   ISTATAPO2  77  68   ISTATATCO2  27  30    VTRVJRJ4OLY  97  96   ISTATARTHCO3  26.5*  29.3*   ISTATARTBE  4*  7*   ISTATTEMP  37.5 C  38.4 C   ISTATFIO2  40  40   ISTATSPEC  Arterial  Arterial   ISTATAPHTC  7.508*  7.535*   [id number]  80  75       HemoDynamics:  Pulse: (!) 118, Heart Rate (Monitored): (!) 123  Arterial BP: (!) 65/58, NIBP: (!) 90/59  CVP (mm Hg): (!) 16 MM HG            Neuro:  GCS Total [place] Coma Score: 11         Fluids:  Intake/Output       12/29/17 0700 - 12/30/17 0659 12/30/17 0700 - 12/31/17 0659 12/31/17 0700 - 01/01/18 0659      8181-3744 9636-8543 Total 2208-7869 2562-1432 Total 7783-2976 2366-9938 Total       Intake    I.V.  1616.5  1591.9 3208.4  1316.5  1705.6 3022.1  --  -- --    Precedex Volume 153.6 -- 153.6 -- -- -- -- -- --    Amiodarone Volume -- 307.8 307.8 170 204 374 -- -- --    Phenylephrine Volume -- -- -- 22.8 205.7 228.4 -- -- --    Propofol Volume 425.5 563.4 988.9 458.5 668.7 1127.2 -- -- --    Heparin Volume 371.8 -- 371.8 -- -- -- -- -- --    Epinephrine Volume 207.6 260.8 468.4 261.3 239.2 500.5 -- -- --    IV Piggyback Volume 200 200 400 200 220 420 -- -- --    NS  120 240 100 30 130 -- -- --    IV Volume (Fentanyl ) 18 20 38 4 18 22 -- -- --    IV Volume (Lasix) 120 120 240 100 120 220 -- -- --    Other  380  -- 380  --  400 400  --  -- --    Medications (P.O./ Enteral Liquids) 380 -- 380 -- 400 400 -- -- --    Enteral  960  960 1920  560  770 1330  --  -- --    Enteral Volume   -- -- --    Free Water / Tube Flush -- -- -- -- 130 130 -- -- --    Total Intake 2956.5 2551.9 5508.4 1876.5 2875.6 4752.1 -- -- --       Output    Urine  3650  4000 7650  3700  2725 6425  --  -- --    Indwelling Cathether 3650 4000 7650 3700 2725 6425 -- -- --    Drains  150  50 200  60  90 150  --  -- --    Mediastinal Chest Tube 1 50 0 50 -- -- -- -- -- --    Francisco Javier Girard 1 100 50 150 60 90 150 -- -- --    Stool  --  -- --  --  -- --  --  -- --    Number of Times Stooled -- 0 x 0 x 0 x -- 0 x  -- -- --    Total Output 3800 4050 7893 0260 9359 6502 -- -- --       Net I/O     -843.5 -1498.1 -2341.6 -1883.5 60.6 -1823 -- -- --        Weight: (!) 154.1 kg (339 lb 11.7 oz)  Recent Labs      17   0537   17   0407   17   1733  17   0030  17   0500   SODIUM  143   --   141   --    --    --   142   POTASSIUM  3.8   < >  4.6   < >  3.7  4.3  3.8   CHLORIDE  108   --   104   --    --    --   102   CO2  26   --   26   --    --    --   32   BUN  43*   --   44*   --    --    --   45*   CREATININE  1.27   --   1.14   --    --    --   1.28   CALCIUM  8.0*   --   7.8*   --    --    --   8.1*    < > = values in this interval not displayed.       GI/Nutrition:  Liver Function  Recent Labs      17   0537  17   0407  17   0500   GLUCOSE  166*  175*  123*       Heme:  Recent Labs      17   1710  17   0033  17   0148  17   0537  17   0845  17   0407  17   0500   RBC   --    --    --   3.57*   --   3.42*  3.56*   HEMOGLOBIN   --    --    --   8.5*   --   8.4*  8.6*   HEMATOCRIT   --    --    --   28.6*   --   28.0*  28.8*   PLATELETCT   --    --    --   421   --   485*  540*   PROTHROMBTM   --    --    --    --   15.8*   --    --    APTT  33.7  33.7  34.6   --    --    --    --    INR   --    --    --    --   1.29*   --    --        Infectious Disease:  Temp  Av.9 °C (100.2 °F)  Min: 37.2 °C (99 °F)  Max: 38.2 °C (100.8 °F)  Micro: cultures reviewed  Recent Labs      17   0537  17   0407  17   0500   WBC  30.4*  27.6*  26.8*     Current Facility-Administered Medications   Medication Dose Frequency Provider Last Rate Last Dose   • amiodarone (CORDARONE) tablet 400 mg  400 mg TWICE DAILY Gui Delcid M.D.   400 mg at 17 0820   • docusate sodium 100mg/10mL (COLACE) solution 100 mg  100 mg DAILY Silvia Preston A.PAlexN.   100 mg at 17 0822    Or   • docusate sodium (COLACE) capsule 100 mg  100 mg DAILY Silvia AMES  Mohan A.P.N.       • enoxaparin (LOVENOX) injection 150 mg  1 mg/kg Q12HRS Amrik Banegas M.D.   Stopped at 12/31/17 0900   • ceFAZolin (ANCEF) IVPB 2 g  2 g Q8HRS Amrik Banegas M.D.   2 g at 12/31/17 0522   • amiodarone (CORDARONE) 450 mg in D5W 250 mL Infusion  0.5-1 mg/min Continuous Amrik Banegas M.D. 17 mL/hr at 12/31/17 0822 0.5 mg/min at 12/31/17 0822   • potassium bicarbonate (KLYTE) 25 MEQ effervescent tablet TBEF 50 mEq  50 mEq Q6HRS Amrik Banegas M.D.   50 mEq at 12/31/17 0522   • aspirin (ASA) chewable tab 81 mg  81 mg DAILY Kiel Ash M.D.   81 mg at 12/31/17 0820   • tetrahydrozoline (VISINE) 0.05 % ophthalmic solution 1 Drop  1 Drop 4X/DAY PRN Amrik Banegas M.D.       • furosemide (LASIX) 100 mg in  mL infusion  10 mg/hr Continuous Radha Martinez A.P.N. 10 mL/hr at 12/30/17 2223 10 mg/hr at 12/30/17 2223   • K+ Scale: Goal of 4.5  1 Each Q6HRS Radha Martinez A.P.N.   1 Each at 12/31/17 0600   • insulin NPH (HUMULIN,NOVOLIN) injection 5 Units  5 Units Q8HRS Silvia Preston, A.P.N.   5 Units at 12/31/17 0522   • phenylephrine (KRISTIN-SYNEPHRINE) 80,000 mcg in  mL Infusion  0-50 mcg/min Continuous Radha Martinez A.P.N. 18.8 mL/hr at 12/31/17 0015 50 mcg/min at 12/31/17 0015   • famotidine (PEPCID) tablet 20 mg  20 mg BID Rodolfo Landa M.D.   20 mg at 12/31/17 0900   • chlorhexidine (PERIDEX) 0.12 % solution 15 mL  15 mL BID Rodolfo Landa M.D.   15 mL at 12/31/17 0821   • fentaNYL (SUBLIMAZE) 50 mcg/mL in 50mL (Continuous Infusion)   Continuous Rodolfo Landa M.D. 1 mL/hr at 12/31/17 0929 50 mcg/hr at 12/31/17 0929   • insulin lispro (HUMALOG) injection 0-15 Units  0-15 Units Q6HRS Radha Martinez A.P.N.   Stopped at 12/31/17 0523   • Respiratory Care per Protocol   Continuous RT Silvia Preston A.P.N.       • NS infusion   Continuous Silvia Preston A.P.N. 10 mL/hr at 12/28/17 0712     • Pharmacy Consult Request ...Pain Management Review 1 Each  1 Each PRN Silvia AMES  Mohan, A.P.N.       • senna-docusate (PERICOLACE or SENOKOT S) 8.6-50 MG per tablet 1 Tab  1 Tab Nightly Silvia HUI Mohan A.P.N.   1 Tab at 12/30/17 1954    And   • senna-docusate (PERICOLACE or SENOKOT S) 8.6-50 MG per tablet 1 Tab  1 Tab Q24HRS PRN Silvia Preston A.P.N.   1 Tab at 12/28/17 0732    And   • lactulose 20 GM/30ML solution 30 mL  30 mL Q24HRS PRN Silvia Preston A.P.N.        And   • bisacodyl (DULCOLAX) suppository 10 mg  10 mg Q24HRS PRN Silvia Preston A.P.N.   10 mg at 12/26/17 2200    And   • fleet enema 133 mL  1 Each Once PRN Silvia Preston A.P.N.       • electrolyte-A (PLASMALYTE-A) infusion   PRN Silvia Preston A.P.N.       • oxycodone immediate release (ROXICODONE) tablet 5 mg  5 mg Q3HRS PRN Silvia Preston, A.P.N.       • oxycodone immediate release (ROXICODONE) tablet 10 mg  10 mg Q3HRS PRN Silvia Preston A.P.N.   10 mg at 12/29/17 1804   • morphine (pf) 4 mg/ml injection 4 mg  4 mg Q3HRS PRN Silvia Preston A.P.N.       • tramadol (ULTRAM) 50 MG tablet 50 mg  50 mg Q4HRS PRN Silvia Preston, A.P.N.       • midazolam (VERSED) 2 MG/2ML injection 0.5-2 mg  0.5-2 mg Q HOUR PRN Silvia Preston A.P.N.   2 mg at 12/24/17 0017   • sodium bicarbonate 8.4 % injection 50 mEq  50 mEq Q HOUR PRN Silvia Preston, A.P.N.       • morphine (pf) 4 mg/ml injection 4 mg  4 mg Q HOUR PRN Silvia Preston A.P.N.   4 mg at 12/23/17 0239   • ondansetron (ZOFRAN) syringe/vial injection 4 mg  4 mg Q6HRS PRN Silvia Preston, A.P.N.        Or   • prochlorperazine (COMPAZINE) injection 10 mg  10 mg Q6HRS PRN Silvia Preston, A.P.N.        Or   • promethazine (PHENERGAN) suppository 25 mg  25 mg Q6HRS PRN Silvia Preston, A.P.N.       • acetaminophen (TYLENOL) tablet 650 mg  650 mg Q4HRS PRN Silvia Preston, A.P.N.   650 mg at 12/25/17 1910    Or   • acetaminophen (TYLENOL) suppository 650 mg  650 mg Q4HRS PRN Silvia Preston, A.P.N.       • mag hydrox-al hydrox-simeth (MAALOX PLUS ES or MYLANTA DS) suspension 30 mL  30 mL  Q4HRS PRN SUSI Mendoza.P.N.       • diphenhydrAMINE (BENADRYL) tablet/capsule 25 mg  25 mg HS PRN - MR X 1 SUSI Mendoza.P.N.   25 mg at 12/30/17 1954   • epinephrine 1 mg/mL(1:1000) 8 mg in  mL Infusion  0-0.2 mcg/kg/min Continuous Kiel Ash M.D. 23 mL/hr at 12/31/17 0630 0.04 mcg/kg/min at 12/31/17 0630   • propofol (DIPRIVAN) injection  0-80 mcg/kg/min Continuous SUSI Mendoza.P.N. 13.8 mL/hr at 12/31/17 0825 15 mcg/kg/min at 12/31/17 0825   • albuterol inhaler 2 Puff  2 Puff Q4H PRN (RT) Kiel Ash M.D.       • alprazolam (XANAX) tablet 0.25 mg  0.25 mg Q6HRS PRN Radha Martinez A.P.N.   0.25 mg at 12/29/17 1202   • rosuvastatin (CRESTOR) tablet 20 mg  20 mg Q EVENING Silvia Preston A.P.N.   20 mg at 12/30/17 1954   • ipratropium-albuterol (DUONEB) nebulizer solution 3 mL  3 mL Q4H PRN (RT) Kiel Ash M.D.   3 mL at 12/23/17 1154     Last reviewed on 12/22/2017  7:07 AM by Katie Aly R.N.    Quality  Measures:   Labs reviewed, Medications reviewed and Radiology images reviewed                      Assessment/Plan:  Status post 2-vessel coronary artery bypass grafting on 12/22/2017.   - Status post IABP    - he remains on vasopressor therapy, I have actively titrated these   - chest tubes out   - Lasix drip, monitor volume status and hemodynamics closely   - Postoperative brief cardiac arrest, now awake and following  Acute hypoxic respiratory failure secondary to above.   - intubated 12/22   - Continue full ventilator support today, he has not tolerated any ventilator weaning   - I discussed tracheostomy in detail with cardiovascular surgery as well as the patient's family   - At this point I believe tracheostomy would be advisable to facilitate ventilator weaning and liberation   - We'll proceed with percutaneous tracheostomy today  Coronary artery disease with multivessel disease.  Pneumonia   - MSSA and Klebsiella identified   - Antibiotics de-escalated to  Ancef, continue ×10 days  Cardiomyopathy EF 35% and global systolic dysfunction.  Severely dilated right ventricle  Chronic obstructive pulmonary disease.  Reported diabetes.   - ssi and q4 fsbs  Dyslipidemia.  Clinically with obstructive sleep apnea.  The patient remains critically ill. We will proceed with percutaneous tracheostomy. He will be continued on full ventilator support and I treated ventilator settings and vasopressors    Discussed patient condition and risk of morbidity and/or mortality with Family, RN, RT, Therapies, Pharmacy and [facility].    The patient remains critically ill.  Critical care time = 34 minutes in directly providing and coordinating critical care and extensive data review.  No time overlap and excludes procedures.

## 2018-01-01 ENCOUNTER — APPOINTMENT (OUTPATIENT)
Dept: RADIOLOGY | Facility: MEDICAL CENTER | Age: 53
DRG: 003 | End: 2018-01-01
Attending: INTERNAL MEDICINE
Payer: MEDICARE

## 2018-01-01 LAB
ACTION RANGE TRIGGERED IACRT: NO
ALBUMIN SERPL BCP-MCNC: 2.4 G/DL (ref 3.2–4.9)
ANION GAP SERPL CALC-SCNC: 6 MMOL/L (ref 0–11.9)
BACTERIA BLD CULT: NORMAL
BASE EXCESS BLDA CALC-SCNC: 10 MMOL/L (ref -4–3)
BODY TEMPERATURE: ABNORMAL DEGREES
BUN SERPL-MCNC: 47 MG/DL (ref 8–22)
CALCIUM SERPL-MCNC: 8.3 MG/DL (ref 8.5–10.5)
CHLORIDE SERPL-SCNC: 102 MMOL/L (ref 96–112)
CO2 BLDA-SCNC: 35 MMOL/L (ref 20–33)
CO2 SERPL-SCNC: 33 MMOL/L (ref 20–33)
CREAT SERPL-MCNC: 1.26 MG/DL (ref 0.5–1.4)
CRP SERPL HS-MCNC: 14.26 MG/DL (ref 0–0.75)
ERYTHROCYTE [DISTWIDTH] IN BLOOD BY AUTOMATED COUNT: 60.6 FL (ref 35.9–50)
GFR SERPL CREATININE-BSD FRML MDRD: 60 ML/MIN/1.73 M 2
GLUCOSE BLD-MCNC: 145 MG/DL (ref 65–99)
GLUCOSE BLD-MCNC: 160 MG/DL (ref 65–99)
GLUCOSE BLD-MCNC: 165 MG/DL (ref 65–99)
GLUCOSE BLD-MCNC: 169 MG/DL (ref 65–99)
GLUCOSE SERPL-MCNC: 154 MG/DL (ref 65–99)
GRAM STN SPEC: NORMAL
HCO3 BLDA-SCNC: 33.4 MMOL/L (ref 17–25)
HCT VFR BLD AUTO: 28.4 % (ref 42–52)
HGB BLD-MCNC: 8.3 G/DL (ref 14–18)
INST. QUALIFIED PATIENT IIQPT: YES
MAGNESIUM SERPL-MCNC: 2.2 MG/DL (ref 1.5–2.5)
MCH RBC QN AUTO: 23.8 PG (ref 27–33)
MCHC RBC AUTO-ENTMCNC: 29.2 G/DL (ref 33.7–35.3)
MCV RBC AUTO: 81.4 FL (ref 81.4–97.8)
O2/TOTAL GAS SETTING VFR VENT: 40 %
PCO2 BLDA: 36.9 MMHG (ref 26–37)
PCO2 TEMP ADJ BLDA: 38.5 MMHG (ref 26–37)
PH BLDA: 7.57 [PH] (ref 7.4–7.5)
PH TEMP ADJ BLDA: 7.55 [PH] (ref 7.4–7.5)
PLATELET # BLD AUTO: 620 K/UL (ref 164–446)
PMV BLD AUTO: 10.3 FL (ref 9–12.9)
PO2 BLDA: 57 MMHG (ref 64–87)
PO2 TEMP ADJ BLDA: 61 MMHG (ref 64–87)
POTASSIUM SERPL-SCNC: 3.5 MMOL/L (ref 3.6–5.5)
POTASSIUM SERPL-SCNC: 3.5 MMOL/L (ref 3.6–5.5)
POTASSIUM SERPL-SCNC: 3.7 MMOL/L (ref 3.6–5.5)
POTASSIUM SERPL-SCNC: 4 MMOL/L (ref 3.6–5.5)
POTASSIUM SERPL-SCNC: 4.3 MMOL/L (ref 3.6–5.5)
PREALB SERPL-MCNC: 9 MG/DL (ref 18–38)
RBC # BLD AUTO: 3.49 M/UL (ref 4.7–6.1)
SAO2 % BLDA: 93 % (ref 93–99)
SIGNIFICANT IND 70042: NORMAL
SIGNIFICANT IND 70042: NORMAL
SITE SITE: NORMAL
SITE SITE: NORMAL
SODIUM SERPL-SCNC: 141 MMOL/L (ref 135–145)
SOURCE SOURCE: NORMAL
SOURCE SOURCE: NORMAL
SPECIMEN DRAWN FROM PATIENT: ABNORMAL
WBC # BLD AUTO: 26.9 K/UL (ref 4.8–10.8)

## 2018-01-01 PROCEDURE — A9270 NON-COVERED ITEM OR SERVICE: HCPCS | Performed by: CLINICAL NURSE SPECIALIST

## 2018-01-01 PROCEDURE — A9270 NON-COVERED ITEM OR SERVICE: HCPCS | Performed by: INTERNAL MEDICINE

## 2018-01-01 PROCEDURE — 83735 ASSAY OF MAGNESIUM: CPT

## 2018-01-01 PROCEDURE — 700102 HCHG RX REV CODE 250 W/ 637 OVERRIDE(OP): Performed by: CLINICAL NURSE SPECIALIST

## 2018-01-01 PROCEDURE — 700111 HCHG RX REV CODE 636 W/ 250 OVERRIDE (IP): Performed by: INTERNAL MEDICINE

## 2018-01-01 PROCEDURE — 700111 HCHG RX REV CODE 636 W/ 250 OVERRIDE (IP): Performed by: THORACIC SURGERY (CARDIOTHORACIC VASCULAR SURGERY)

## 2018-01-01 PROCEDURE — 700105 HCHG RX REV CODE 258: Performed by: THORACIC SURGERY (CARDIOTHORACIC VASCULAR SURGERY)

## 2018-01-01 PROCEDURE — 36600 WITHDRAWAL OF ARTERIAL BLOOD: CPT

## 2018-01-01 PROCEDURE — 71045 X-RAY EXAM CHEST 1 VIEW: CPT

## 2018-01-01 PROCEDURE — 84132 ASSAY OF SERUM POTASSIUM: CPT | Mod: 91

## 2018-01-01 PROCEDURE — 82962 GLUCOSE BLOOD TEST: CPT

## 2018-01-01 PROCEDURE — 700111 HCHG RX REV CODE 636 W/ 250 OVERRIDE (IP): Performed by: NURSE PRACTITIONER

## 2018-01-01 PROCEDURE — 700102 HCHG RX REV CODE 250 W/ 637 OVERRIDE(OP): Performed by: INTERNAL MEDICINE

## 2018-01-01 PROCEDURE — 700111 HCHG RX REV CODE 636 W/ 250 OVERRIDE (IP): Performed by: CLINICAL NURSE SPECIALIST

## 2018-01-01 PROCEDURE — 80048 BASIC METABOLIC PNL TOTAL CA: CPT

## 2018-01-01 PROCEDURE — 700112 HCHG RX REV CODE 229: Performed by: CLINICAL NURSE SPECIALIST

## 2018-01-01 PROCEDURE — 94003 VENT MGMT INPAT SUBQ DAY: CPT

## 2018-01-01 PROCEDURE — 700102 HCHG RX REV CODE 250 W/ 637 OVERRIDE(OP): Performed by: THORACIC SURGERY (CARDIOTHORACIC VASCULAR SURGERY)

## 2018-01-01 PROCEDURE — 700105 HCHG RX REV CODE 258: Performed by: NURSE PRACTITIONER

## 2018-01-01 PROCEDURE — A9270 NON-COVERED ITEM OR SERVICE: HCPCS | Performed by: THORACIC SURGERY (CARDIOTHORACIC VASCULAR SURGERY)

## 2018-01-01 PROCEDURE — 700105 HCHG RX REV CODE 258: Performed by: INTERNAL MEDICINE

## 2018-01-01 PROCEDURE — 700105 HCHG RX REV CODE 258: Performed by: CLINICAL NURSE SPECIALIST

## 2018-01-01 PROCEDURE — 770022 HCHG ROOM/CARE - ICU (200)

## 2018-01-01 PROCEDURE — 700101 HCHG RX REV CODE 250: Performed by: INTERNAL MEDICINE

## 2018-01-01 PROCEDURE — 82040 ASSAY OF SERUM ALBUMIN: CPT

## 2018-01-01 PROCEDURE — 82803 BLOOD GASES ANY COMBINATION: CPT

## 2018-01-01 PROCEDURE — 85027 COMPLETE CBC AUTOMATED: CPT

## 2018-01-01 RX ORDER — DIGOXIN 125 MCG
250 TABLET ORAL DAILY
Status: DISCONTINUED | OUTPATIENT
Start: 2018-01-01 | End: 2018-01-10 | Stop reason: HOSPADM

## 2018-01-01 RX ORDER — POTASSIUM CHLORIDE 14.9 MG/ML
20 INJECTION INTRAVENOUS ONCE
Status: COMPLETED | OUTPATIENT
Start: 2018-01-01 | End: 2018-01-01

## 2018-01-01 RX ORDER — ROSUVASTATIN CALCIUM 10 MG/1
40 TABLET, COATED ORAL EVERY EVENING
Status: DISCONTINUED | OUTPATIENT
Start: 2018-01-01 | End: 2018-01-10 | Stop reason: HOSPADM

## 2018-01-01 RX ORDER — DEXMEDETOMIDINE HYDROCHLORIDE 4 UG/ML
.1-1.5 INJECTION, SOLUTION INTRAVENOUS CONTINUOUS
Status: DISCONTINUED | OUTPATIENT
Start: 2018-01-01 | End: 2018-01-07

## 2018-01-01 RX ADMIN — ALUMINUM HYDROXIDE, MAGNESIUM HYDROXIDE,SIMETHICONE 30 ML: 400; 400; 40 LIQUID ORAL at 07:53

## 2018-01-01 RX ADMIN — CHLORHEXIDINE GLUCONATE 15 ML: 1.2 RINSE ORAL at 20:05

## 2018-01-01 RX ADMIN — POTASSIUM CHLORIDE 20 MEQ: 2 INJECTION, SOLUTION, CONCENTRATE INTRAVENOUS at 13:48

## 2018-01-01 RX ADMIN — DOCUSATE SODIUM 100 MG: 50 LIQUID ORAL at 07:54

## 2018-01-01 RX ADMIN — CEFAZOLIN SODIUM 2 G: 2 INJECTION, SOLUTION INTRAVENOUS at 22:45

## 2018-01-01 RX ADMIN — SODIUM CHLORIDE: 9 INJECTION, SOLUTION INTRAVENOUS at 00:22

## 2018-01-01 RX ADMIN — STANDARDIZED SENNA CONCENTRATE AND DOCUSATE SODIUM 1 TABLET: 8.6; 5 TABLET, FILM COATED ORAL at 20:05

## 2018-01-01 RX ADMIN — DEXMEDETOMIDINE HYDROCHLORIDE 1.2 MCG/KG/HR: 4 INJECTION, SOLUTION INTRAVENOUS at 15:25

## 2018-01-01 RX ADMIN — AMIODARONE HYDROCHLORIDE 0.5 MG/MIN: 50 INJECTION, SOLUTION INTRAVENOUS at 13:22

## 2018-01-01 RX ADMIN — ROSUVASTATIN CALCIUM 40 MG: 10 TABLET, FILM COATED ORAL at 20:05

## 2018-01-01 RX ADMIN — ASPIRIN 81 MG: 81 TABLET, CHEWABLE ORAL at 07:49

## 2018-01-01 RX ADMIN — POTASSIUM BICARBONATE 50 MEQ: 25 TABLET, EFFERVESCENT ORAL at 17:19

## 2018-01-01 RX ADMIN — DEXMEDETOMIDINE HYDROCHLORIDE 1 MCG/KG/HR: 4 INJECTION, SOLUTION INTRAVENOUS at 18:06

## 2018-01-01 RX ADMIN — PROPOFOL 20 MCG/KG/MIN: 10 INJECTION, EMULSION INTRAVENOUS at 02:00

## 2018-01-01 RX ADMIN — FAMOTIDINE 20 MG: 20 TABLET, FILM COATED ORAL at 20:05

## 2018-01-01 RX ADMIN — EPINEPHRINE 0.03 MCG/KG/MIN: 1 INJECTION, SOLUTION INTRAMUSCULAR; SUBCUTANEOUS at 18:09

## 2018-01-01 RX ADMIN — FUROSEMIDE 10 MG/HR: 10 INJECTION, SOLUTION INTRAMUSCULAR; INTRAVENOUS at 11:45

## 2018-01-01 RX ADMIN — POTASSIUM BICARBONATE 50 MEQ: 25 TABLET, EFFERVESCENT ORAL at 05:21

## 2018-01-01 RX ADMIN — CHLORHEXIDINE GLUCONATE 15 ML: 1.2 RINSE ORAL at 07:49

## 2018-01-01 RX ADMIN — INSULIN HUMAN 5 UNITS: 100 INJECTION, SUSPENSION SUBCUTANEOUS at 23:25

## 2018-01-01 RX ADMIN — Medication 100 MCG/HR: at 11:10

## 2018-01-01 RX ADMIN — AMIODARONE HYDROCHLORIDE 400 MG: 200 TABLET ORAL at 20:05

## 2018-01-01 RX ADMIN — PROPOFOL 20 MCG/KG/MIN: 10 INJECTION, EMULSION INTRAVENOUS at 01:55

## 2018-01-01 RX ADMIN — FUROSEMIDE 10 MG/HR: 10 INJECTION, SOLUTION INTRAMUSCULAR; INTRAVENOUS at 23:05

## 2018-01-01 RX ADMIN — POTASSIUM CHLORIDE 20 MEQ: 200 INJECTION, SOLUTION INTRAVENOUS at 01:50

## 2018-01-01 RX ADMIN — CEFAZOLIN SODIUM 2 G: 2 INJECTION, SOLUTION INTRAVENOUS at 13:52

## 2018-01-01 RX ADMIN — POTASSIUM CHLORIDE 20 MEQ: 200 INJECTION, SOLUTION INTRAVENOUS at 07:26

## 2018-01-01 RX ADMIN — AMIODARONE HYDROCHLORIDE 400 MG: 200 TABLET ORAL at 07:49

## 2018-01-01 RX ADMIN — ACETAMINOPHEN 650 MG: 325 TABLET, FILM COATED ORAL at 18:40

## 2018-01-01 RX ADMIN — DEXMEDETOMIDINE HYDROCHLORIDE 0.2 MCG/KG/HR: 4 INJECTION, SOLUTION INTRAVENOUS at 07:50

## 2018-01-01 RX ADMIN — POTASSIUM BICARBONATE 50 MEQ: 25 TABLET, EFFERVESCENT ORAL at 00:35

## 2018-01-01 RX ADMIN — ACETAMINOPHEN 650 MG: 325 TABLET, FILM COATED ORAL at 14:39

## 2018-01-01 RX ADMIN — INSULIN HUMAN 5 UNITS: 100 INJECTION, SUSPENSION SUBCUTANEOUS at 05:35

## 2018-01-01 RX ADMIN — DIGOXIN 250 MCG: 125 TABLET ORAL at 17:19

## 2018-01-01 RX ADMIN — CEFAZOLIN SODIUM 2 G: 2 INJECTION, SOLUTION INTRAVENOUS at 05:21

## 2018-01-01 RX ADMIN — POTASSIUM BICARBONATE 50 MEQ: 25 TABLET, EFFERVESCENT ORAL at 12:44

## 2018-01-01 RX ADMIN — DEXMEDETOMIDINE HYDROCHLORIDE 0.8 MCG/KG/HR: 4 INJECTION, SOLUTION INTRAVENOUS at 13:03

## 2018-01-01 RX ADMIN — ENOXAPARIN SODIUM 150 MG: 150 INJECTION SUBCUTANEOUS at 20:05

## 2018-01-01 RX ADMIN — ENOXAPARIN SODIUM 150 MG: 150 INJECTION SUBCUTANEOUS at 08:05

## 2018-01-01 RX ADMIN — PROPOFOL 20 MCG/KG/MIN: 10 INJECTION, EMULSION INTRAVENOUS at 07:27

## 2018-01-01 RX ADMIN — FAMOTIDINE 20 MG: 20 TABLET, FILM COATED ORAL at 07:49

## 2018-01-01 RX ADMIN — DEXMEDETOMIDINE HYDROCHLORIDE 0.8 MCG/KG/HR: 4 INJECTION, SOLUTION INTRAVENOUS at 21:20

## 2018-01-01 NOTE — CARE PLAN
Problem: Risk for Impaired Mobility--Activity Intolerance  Goal: Mobilize and/or Transfer Safely with Maximum Page  Outcome: PROGRESSING SLOWER THAN EXPECTED  Pt mobilized to EOB for 7 minutes with the assistance of 5. Pt requiring max assistance and is unable to help with the mobilization.    Problem: Pain  Goal: Alleviation of Pain or a reduction in pain to the patient's comfort goal  Outcome: PROGRESSING AS EXPECTED  Pt displaying s/s of pain during shift as measured by CPOT (See flowsheet). Fentanyl drip titrated up. Pt less restless and was able to be mobilized.

## 2018-01-01 NOTE — DISCHARGE PLANNING
Medical SW    Referral: Sw attended AM IDT Rounds.    Intervention: Family at bedside and listened to rounds discussion for pt. Cards on case.     Plan: Sw to assist w/ d/c planning as needed.

## 2018-01-01 NOTE — DIETARY
Weekly Nutrition Support Update (TF):  Day 13 of admit.  TF started initially on 12/24 as pt on vent.   Pt now has trach, placed on 12/31, and remains sedated and on vent.  TF remains appropriate at this time.    TF via gastric Cotrak: Peptamen Intense VHP, goal rate 85 ml/hr, providing 2040 kcals, 188 grams protein, 1714 mL free water. TF currently running @ 80 ml/hr, which is goal while on propofol.      Pertinent Labs: K 3.7, glucose 154, FSBS (12/30 - 1/1) 124 - 196, BUN 47, weekly pre-albumin 9.0 (trended up from last week's 4.0), weekly CRP 14.26 (trended down from last week's 19.88) - pre-albumin and CRP have trended favorably, suggesting an improvement in inflammation   Pertinent Meds: bowel meds, pepcid, SSI, insulin NPH, K+ scale, klyte, KCl, precedex, epinephrine @ 0.03 mcg/kg/min, fentanyl, lasix drip, jazz-synephrine @ 30 mcg/min, propofol was stopped today  Fluids: NS @ 10 ml/hr  GI: last BM 12/31  Wt: today's bed scale wt = 151.3 kg - trended down 7.9 kg since admit (wt on 12/19 = 159.2 kg), fluid status likely contributing to wt loss, as evidenced by I/Os showing ~5L fluid negative since admit (pt actively receiving lasix)  Skin: per wound flowsheet; sx incision to L upper groin, sx incision to chest, and sx incision to L lower knee;leg    Plan/Recommend:   Propofol stopped - advance TF to final goal rate of Peptamen Intense VHP @ 85 ml/hr.   Fluids per MD. MENDOZA following

## 2018-01-01 NOTE — PROGRESS NOTES
Cardiovascular Surgery Progress Note    Name: Joshua Medrano  MRN: [id number]  : 1965  Admit Date: 2017 10:21 AM  Procedure:  Procedure(s) and Anesthesia Type:     * MULTIPLE CORONARY ARTERY BYPASS ENDO VEIN HARVEST X2 - General     * JIM - General  10 Day Post-Op    Vitals:  Patient Vitals for the past 8 hrs:   Temp SpO2 O2 Delivery Pulse Heart Rate (Monitored) Resp NIBP   18 0713 - 94 % - - 95 - -   18 0600 - 93 % - 70 83 (!) 26 (!) 97/55   18 0500 - 94 % - 86 88 19 102/58   18 0416 - 92 % - - 96 - -   18 0400 38 °C (100.4 °F) 93 % Ventilator 85 92 (!) 29 102/57   18 0326 - 93 % - - 81 - -   18 0300 - 94 % - 79 82 (!) 26 (!) 90/53     Temp (24hrs), Av.2 °C (100.7 °F), Min:38 °C (100.4 °F), Max:38.5 °C (101.3 °F)      Respiratory:  Leo Vent Mode: APVCMV, Rate (breaths/min): 26, PEEP/CPAP: 8, FiO2: 40, P Peak (PIP): 26, P MEAN: 14 Respiration: (!) 26, Pulse Oximetry: 94 %     Chest Tube Drains:          Fluids:    Intake/Output Summary (Last 24 hours) at 18 1021  Last data filed at 18 0600   Gross per 24 hour   Intake          4177.68 ml   Output             4400 ml   Net          -222.32 ml     Admit weight: Weight: (!) 159.2 kg (350 lb 15.6 oz)  Current weight: Weight: (!) 151.3 kg (333 lb 8.9 oz) (18 0100)    Labs:  Recent Labs      17   0407  17   0500  18   0537   WBC  27.6*  26.8*  26.9*   RBC  3.42*  3.56*  3.49*   HEMOGLOBIN  8.4*  8.6*  8.3*   HEMATOCRIT  28.0*  28.8*  28.4*   MCV  81.9  80.9*  81.4   MCH  24.6*  24.2*  23.8*   MCHC  30.0*  29.9*  29.2*   RDW  61.2*  60.0*  60.6*   PLATELETCT  485*  540*  620*   MPV  10.8  10.9  10.3         Recent Labs      17   0407   17   0500   17   1802  17   2352  18   0537   SODIUM  141   --   142   --    --    --   141   POTASSIUM  4.6   < >  3.8   < >  4.0  3.5*  3.5*   CHLORIDE  104   --   102   --    --    --   102   CO2  26   --   32    --    --    --   33   GLUCOSE  175*   --   123*   --    --    --   154*   BUN  44*   --   45*   --    --    --   47*   CREATININE  1.14   --   1.28   --    --    --   1.26   CALCIUM  7.8*   --   8.1*   --    --    --   8.3*    < > = values in this interval not displayed.           Medications:  • rosuvastatin  40 mg     • digoxin  250 mcg     • amiodarone  400 mg     • docusate sodium 100mg/10mL  100 mg      Or   • docusate sodium  100 mg     • enoxaparin (LOVENOX) injection  1 mg/kg     • ceFAZolin  2 g     • potassium bicarbonate  50 mEq     • aspirin  81 mg     • K+ Scale: Goal of 4.5  1 Each     • insulin NPH  5 Units     • famotidine  20 mg     • chlorhexidine  15 mL     • insulin lispro  0-15 Units     • senna-docusate  1 Tab         Exam:   Review of Systems   Unable to perform ROS: Intubated       Physical Exam   Constitutional: No distress. He is intubated.   Morbidly obese   Neck: Neck supple.   Cardiovascular: Normal rate and regular rhythm.  Exam reveals no gallop.    No murmur heard.  Pulmonary/Chest: Effort normal. He is intubated. No respiratory distress. He has decreased breath sounds in the right lower field and the left lower field.   Abdominal: Soft. He exhibits no distension.   Musculoskeletal: He exhibits edema.   Neurological:   sedated   Skin: Skin is warm.   Psychiatric:   JUAREZ       Quality Measures:     Reviewed items::  EKG reviewed, Labs reviewed, Medications reviewed and Radiology images reviewed  Jenkins catheter::  One or Two Days Post Surgery (Day of Surgery being Day 0) and Critically Ill - Requiring Accurate Measurement of Urinary Output  Central line in place:  Need for access and Vasopressors  DVT prophylaxis pharmacological::  Contraindicated - High bleeding risk  DVT prophylaxis - mechanical:  SCDs  Ulcer Prophylaxis::  Yes      Assessment/Plan:  POD 1 - HOTN- IABP 1:2, epi/jazz, vent- peep inc this am, keep CT/jenkins, CPM  POD 2 HOTN - epi/jazz- IABP 1:2, Vent - pulm following, s/p  PEA arrest yesterday- bronch with lots of thick secretions, sedated, CPM  POD 3 - HOTN - epi/jazz- decreased from yesterday, IABP 1:2, Vent - CMV 26, PEEP 10, sedated, diurese well yesterday- start lasix gtt today, CPM  POD 4 NSR, Hotn-epi/jazz, down slightly form yesterday.  IABP 1:2 no change in pressures with pump on standby--change to 1:4.  On lasix gtt, diuresing well.  FiO2 down to 40%, PEEP 10.  Continue lasix gtt.  Will plan on removing IABP today. CPM.   POD 5 HDS, SB- d/c amio, epi/jazz- weaning, Vent- per pulm, sedated when awake follows per RN, lasix gtt- cont, CPM  POD 6 HDS, NSR, epi/jazz--weaning.  On lasix gtt.  WBCs up, bronch yesterday now on ABX.  Mod r effusion.  Will tap.  Keep SERGE drain L thigh one more day.Keep CTs while vented.  CPM.  POD 7 HDS, on low dose epi, off jazz.  NSR. RUE thrombus started on heparin gtt--held this AM for thoracentesis.  Vent 40%/peep 8.  Sedation weaning.  PLAN:  DC temp wires, resume anticoagulation after thoracentesis.  Vent per pulm. Keep CTs today.  POD 8 Remains on epi, no change.  Back in afib yesterday, rebolused amio, now AT  a flutter?  Cardiology to address. On lovenox.  CXR remains with edema/effusions, unable to perform thoracentesis yesterday. Keep lasix gtt today and k-scale.   Agitated, kept on sedation.  Vent per PMA.  May need to move toward trach next few days.  Plan to hold lovenox in AM to DC CTs and SERGE drain.  DC prevena, DC staples EVH site. CPM.  POD 9 Epi/jazz/lasix, ST, VDRF, sedated.  Lasix gtt.  PLAN: Vent per PMA,  Lovenox held for trach likely today.  DC SERGE drain.  CPM.   POD 10 Epi/jazz/lasix, s/p trach, sedated but more alert/cooperative this AM.  Some serosanguinous oozing SERGE exit site.  Echo today per cardiology. CPM.      Active Hospital Problems    Diagnosis   • CAD (coronary artery disease) [I25.10]   • CHF (congestive heart failure) (CMS-[facility]) [I50.9]

## 2018-01-01 NOTE — PROCEDURES
Date: 12/31/2017    Procedure:  Diagnostic and therapeutic Bronchoscopy with BAL; bronchoscopic assistance with percutaneous tracheostomy    Indication: Respiratory failure, ? Pneumonia, and assistance with percutaneous tracheostomy    Physician:  Amrik Banegas M.D.    Consent:  Obtained from DPOA and attached to medical record    Procedure:  This patient has respiratory failure and has failed extubation and now requires tracheostomy.  Bronchoscopy was indicated for therapeutic airway clearance airway evaluation and BAL to evaluate for pneumonia as well assistance with percutaneous tracheostomy.  A flexible FOB was inserted through the ETT without difficulty.  All airways were evaluated to the sub-segmental level.  The airway mucosa was mildly inflamed.  The ETT was withdrawn until the cuff was seated just above the vocal cords.  I visulized the insertion of percutaneous tracheostomy which was placed without difficulty.  Please see the surgical note for complete details.  The bronchoscope was then inserted through the new tracheostomy. The right lower lobe and left lower lobe segments were therapeutically lavaged with small aliquots of saline. The bronchoscope was then wedged in a segment of the RLL.  20 cc on saline was instilled with moderate return of cloudy BAL fluid which will be sent for appropriate culture.  No immediate complications.       Amrik Banegas M.D.

## 2018-01-01 NOTE — CARE PLAN
Problem: Ventilation Defect:  Goal: Ability to achieve and maintain unassisted ventilation or tolerate decreased levels of ventilator support  Adult Ventilation Update    Total Vent Days: 9    Patient Lines/Drains/Airways Status    Active Airway     Name: Placement date: Placement time: Site: Days:    Airway Group Standard Cuffed Trach Tracheostomy 8.0 12/31/17   1106   Tracheostomy   less than 1                         Plateau Pressure (Q Shift): 24 (12/31/17 0623)  Static Compliance (ml / cm H2O): 60 (12/31/17 1513)    Cough: Productive;Strong (12/31/17 1513)  Sputum Amount: Small;Scant (12/31/17 1513)  Sputum Color: Bloody;Clear (12/31/17 1513)  Sputum Consistency: Thin;Thick (12/31/17 1513)    Events/Summary/Plan: 8.0 portex placed today. Pt remains on APV-CMv. No further changes at this time.

## 2018-01-01 NOTE — CARE PLAN
Problem: Post Op Day 4 CABG/Heart Valve Replacement  Goal: Optimal care of the Post Op CABG/Heart Valve replacement Post Op Day 4    Intervention: Daily Weights  Completed    Intervention: Shower daily and clean incisions twice daily with soap and water  Incisions cleaned  Intervention: Up in chair for all meals  N/A  Intervention: Ambulate, increasing the distance each time x 3 and before bed  N/A  Intervention: IS q 1 hour while awake and record best IS volume  Intubated  Intervention: Consider removal of jenkins, chest tube and pacer wire if not already done  Jenkins in place per MD

## 2018-01-01 NOTE — PROGRESS NOTES
Pt continues to be in afib/flutter heart rhythm with rate sustaining between 120-128. Dr. Banegas ordered a 150 mg IV bolus and to restart amiodarone gtt at 0.5 mg/min.

## 2018-01-01 NOTE — CARE PLAN
Problem: Bowel/Gastric:  Goal: Normal bowel function is maintained or improved  Outcome: PROGRESSING SLOWER THAN EXPECTED  Pt has not had an adequate bm for several days now.  PRN meds given to help facilitate a bm    Problem: Skin Integrity  Goal: Skin Integrity is maintained or improved  Outcome: PROGRESSING AS EXPECTED  Pt skin intact other than surgical incisions that are present.

## 2018-01-01 NOTE — PROGRESS NOTES
Pulmonary Critical Care Progress Note      DOS:  1/1/18, admit 12/19/2017    Chief Complaint: CAD, 2V CABG    History of Present Illness: 53 y/o M, h/o DM, COPD, DLD, CAD, SHASHANK, Obesity; underwent 2v CABG 12/22    Interval Events:  24 hour interval history reviewed   Reason for visit:  A&C respiratory failure, CAD s/p CABG, AF/flut, Low K/Mg, S/p trach 12/31    Sedate on vent  Follows some - less agitated  AF/flut - HR 80s  Bp ok on pressors  Tm 37.9  Trach not bleeding  KRISTIN 50  EPI 0.03  Amio 0.5  Fent 100  Prop 20  Lasix 10/hr drip  Full dose Lovenox  Ancef 6/10  K 3.5  Check Mg  CXR wet/BSSLLA - no change vs old film  Therapies    YESTERDAY   - AF/flutter, , amio .5   - epi, kristin gtts   - lasix gtt   - I/O 4.7/6.5   - vent day 9, min secretions   - cxr unchagned   - CTs out     - Lovenox 150 bid, held for trach    - day 5 of 10 Ancef for MSSA, kleb BAL   - Cr up 1.28   - perc trach today  Yesterday   - POD8   - arouses, follows, restless, prop 60   - AF/flutter, 120's   - amiodarone, gtt .5   - epi gtt, kristin gtt   - lasix gtt 10/hr   - williams TF at 80   - good UOP, I/O 5.5/7.8   - Tm 38.3   - full dose lovenox   - RUE DVT   - vent day 9, 8, 40%; no SBT, cxr edema, atx unchanged   - ancef day 2 for kleb/mssa pna   - will need trach  :  Review of Systems   Unable to perform ROS: Acuity of condition       Physical Exam   Constitutional: He appears well-developed and well-nourished.   HENT:   Head: Normocephalic and atraumatic.   Eyes: Pupils are equal, round, and reactive to light. No scleral icterus.   Neck: No JVD present. No tracheal deviation present.   Cardiovascular: Normal rate and intact distal pulses.  Exam reveals no gallop and no friction rub.    No murmur heard.  Pulmonary/Chest: No respiratory distress. He has rales.   Diminished, scattered coarse crackles, no wheezes   Abdominal: Soft. He exhibits no distension. There is no tenderness. There is no rebound and no guarding.   Musculoskeletal: He exhibits  edema. He exhibits no deformity.   Trace edema   Neurological: No cranial nerve deficit.   sedate, now following some commands intermittently, moves all extremities   Skin: Skin is warm and dry.   Psychiatric:   sedate   Nursing note and vitals reviewed.        PFSH:  No change.    Respiratory:  Leo Vent Mode: APVCMV, Rate (breaths/min): 26, Vt Target (mL): 540, PEEP/CPAP: 8, FiO2: 40, Static Compliance (ml / cm H2O): 44, Control VTE (exp VT): 553  Pulse Oximetry: 92 %     Ventilator mechanics and waveforms are reviewed at the bedside with RT    Chest Tube Drains:  Francisco Javier Girard 1: 33 ml         Recent Labs      12/29/17   0750  12/30/17   0501  01/01/18   0417   ISTATAPH  7.516*  7.557*  7.565*   ISTATAPCO2  32.7  33.0  36.9   ISTATAPO2  77  68  57*   ISTATATCO2  27  30  35*   [id number]  97  96  93   ISTATARTHCO3  26.5*  29.3*  33.4*   ISTATARTBE  4*  7*  10*   ISTATTEMP  37.5 C  38.4 C  38.0 C   ISTATFIO2  40  40  40   ISTATSPEC  Arterial  Arterial  Arterial   ISTATAPHTC  7.508*  7.535*  7.549*   [id number]  80  75  61*       HemoDynamics:  Pulse: 86, Heart Rate (Monitored): 96  NIBP: (!) 90/56  CVP (mm Hg): (!) 10 MM HG            Neuro:  GCS Total Reynolds Coma Score: 11         Fluids:  Intake/Output       12/30/17 0700 - 12/31/17 0659 12/31/17 0700 - 01/01/18 0659 01/01/18 0700 - 01/02/18 0659      8920-4141 7871-4510 Total 6805-1816 3503-3846 Total 7722-2757 9262-7901 Total       Intake    I.V.  1316.5  1705.6 3022.1  1299.5  866.9 2166.4  --  -- --    Amiodarone Volume 170 204 374 136 -- 136 -- -- --    Phenylephrine Volume 22.8 205.7 228.4 225.6 150.4 376 -- -- --    Propofol Volume 458.5 668.7 1127.2 204.6 190.5 395.1 -- -- --    Epinephrine Volume 261.3 239.2 500.5 273.3 184 457.3 -- -- --    IV Piggyback Volume 200 220 420 300 300 600 -- -- --    NS  30 130 40 30 70 -- -- --    IV Volume (Fentanyl ) 4 18 22 -- 12 12 -- -- --    IV Volume (Lasix) 100 120 220 120 -- 120 -- -- --    Other  --   400 400  --  150 150  --  -- --    Medications (P.O./ Enteral Liquids) -- 400 400 -- 150 150 -- -- --    Enteral  560  770 1330  580  880 1460  --  -- --    Enteral Volume   -- -- --    Free Water / Tube Flush -- 130 130 20 240 260 -- -- --    Total Intake 1876.5 2875.6 4752.1 1879.5 1896.9 3776.4 -- -- --       Output    Urine  3700  2725 6425  2450  1700 4150  --  -- --    Indwelling Cathether 3700 2725 6425 2450 1700 4150 -- -- --    Drains  60  90 150  --  -- --  --  -- --    Francisco Javier Girard 1 60 90 150 -- -- -- -- -- --    Stool  --  -- --  --  -- --  --  -- --    Number of Times Stooled 0 x -- 0 x -- 1 x 1 x -- -- --    Total Output 3760 2815 6575 2450 1700 4150 -- -- --       Net I/O     -1883.5 60.6 -1823 -570.5 196.9 -373.6 -- -- --        Weight: (!) 151.3 kg (333 lb 8.9 oz)  Recent Labs      17   0407   17   0500  17   1200  17   1802  17   2352   SODIUM  141   --   142   --    --    --    POTASSIUM  4.6   < >  3.8  3.8  4.0  3.5*   CHLORIDE  104   --   102   --    --    --    CO2  26   --   32   --    --    --    BUN  44*   --   45*   --    --    --    CREATININE  1.14   --   1.28   --    --    --    CALCIUM  7.8*   --   8.1*   --    --    --     < > = values in this interval not displayed.       GI/Nutrition:  TF goal  Liver Function  Recent Labs      17   0407  17   0500  17   2352   PREALBUMIN   --    --   9.0*   GLUCOSE  175*  123*   --        Heme:  Recent Labs      17   0845  17   0407  17   0500   RBC   --   3.42*  3.56*   HEMOGLOBIN   --   8.4*  8.6*   HEMATOCRIT   --   28.0*  28.8*   PLATELETCT   --   485*  540*   PROTHROMBTM  15.8*   --    --    INR  1.29*   --    --        Infectious Disease:  Temp  Av.9 °C (100.2 °F)  Min: 37.9 °C (100.2 °F)  Max: 37.9 °C (100.2 °F)  Micro: reviewed  Recent Labs      17   0407  17   0500   WBC  27.6*  26.8*     Current Facility-Administered Medications    Medication Dose Frequency Provider Last Rate Last Dose   • fentaNYL (SUBLIMAZE) injection 100 mcg  100 mcg Q HOUR PRN Amrik Banegas M.D.       • midazolam (VERSED) injection 1-5 mg  1-5 mg Q HOUR PRN Amrik Banegas M.D.       • amiodarone (CORDARONE) tablet 400 mg  400 mg TWICE DAILY Gui Delcid M.D.   400 mg at 12/31/17 2144   • docusate sodium 100mg/10mL (COLACE) solution 100 mg  100 mg DAILY Silvia Preston, A.P.N.   100 mg at 12/31/17 0822    Or   • docusate sodium (COLACE) capsule 100 mg  100 mg DAILY Silvia Preston, A.P.N.       • enoxaparin (LOVENOX) injection 150 mg  1 mg/kg Q12HRS Amrik Banegas M.D.   Stopped at 12/31/17 0900   • ceFAZolin (ANCEF) IVPB 2 g  2 g Q8HRS Amrik Banegas M.D.   2 g at 01/01/18 0521   • amiodarone (CORDARONE) 450 mg in D5W 250 mL Infusion  0.5-1 mg/min Continuous Amrik Banegas M.D. 17 mL/hr at 12/31/17 2352 0.5 mg/min at 12/31/17 2352   • potassium bicarbonate (KLYTE) 25 MEQ effervescent tablet TBEF 50 mEq  50 mEq Q6HRS Amrik Banegas M.D.   50 mEq at 01/01/18 0521   • aspirin (ASA) chewable tab 81 mg  81 mg DAILY Kiel Ash M.D.   81 mg at 12/31/17 0820   • tetrahydrozoline (VISINE) 0.05 % ophthalmic solution 1 Drop  1 Drop 4X/DAY PRN Amrik Banegas M.D.       • furosemide (LASIX) 100 mg in  mL infusion  10 mg/hr Continuous Radha Martinez, A.P.N. 10 mL/hr at 12/31/17 2340 10 mg/hr at 12/31/17 2340   • K+ Scale: Goal of 4.5  1 Each Q6HRS Radha Martinez, A.P.N.   1 Each at 01/01/18 0000   • insulin NPH (HUMULIN,NOVOLIN) injection 5 Units  5 Units Q8HRS Silvia Preston, A.P.N.   5 Units at 01/01/18 0535   • phenylephrine (KRISTIN-SYNEPHRINE) 80,000 mcg in  mL Infusion  0-50 mcg/min Continuous Radha Martinez, A.P.N. 18.8 mL/hr at 12/31/17 0015 50 mcg/min at 12/31/17 0015   • famotidine (PEPCID) tablet 20 mg  20 mg BID Rodolfo Landa M.D.   20 mg at 12/31/17 3488   • chlorhexidine (PERIDEX) 0.12 % solution 15 mL  15 mL BID Rodolfo Landa M.D.   15 mL at  12/31/17 2142   • fentaNYL (SUBLIMAZE) 50 mcg/mL in 50mL (Continuous Infusion)   Continuous Rodolfo Landa M.D. 2 mL/hr at 12/31/17 2155 100 mcg/hr at 12/31/17 2155   • insulin lispro (HUMALOG) injection 0-15 Units  0-15 Units Q6HRS KAMALA AlfredP.NAlex   3 Units at 01/01/18 0535   • Respiratory Care per Protocol   Continuous RT Silvia Preston A.P.N.       • NS infusion   Continuous Silvia Preston A.P.N. 10 mL/hr at 01/01/18 0022     • Pharmacy Consult Request ...Pain Management Review 1 Each  1 Each PRN Silvia Preston A.P.N.       • senna-docusate (PERICOLACE or SENOKOT S) 8.6-50 MG per tablet 1 Tab  1 Tab Nightly Silvia Preston A.P.N.   1 Tab at 12/31/17 2144    And   • senna-docusate (PERICOLACE or SENOKOT S) 8.6-50 MG per tablet 1 Tab  1 Tab Q24HRS PRN Silvia Preston A.P.N.   1 Tab at 12/28/17 0732    And   • lactulose 20 GM/30ML solution 30 mL  30 mL Q24HRS PRN Silvia Preston A.P.N.   30 mL at 12/31/17 2142    And   • bisacodyl (DULCOLAX) suppository 10 mg  10 mg Q24HRS PRN Silvia Preston A.P.N.   10 mg at 12/26/17 2200    And   • fleet enema 133 mL  1 Each Once PRN Silvia Preston A.P.N.       • electrolyte-A (PLASMALYTE-A) infusion   PRN Silvia Preston A.P.N.       • oxycodone immediate release (ROXICODONE) tablet 5 mg  5 mg Q3HRS PRN Silvia Preston, A.P.N.       • oxycodone immediate release (ROXICODONE) tablet 10 mg  10 mg Q3HRS PRN Silvia Preston A.P.N.   10 mg at 12/29/17 1804   • morphine (pf) 4 mg/ml injection 4 mg  4 mg Q3HRS PRN Silvia Preston, A.P.N.       • tramadol (ULTRAM) 50 MG tablet 50 mg  50 mg Q4HRS PRN Silvia Preston, A.P.N.       • midazolam (VERSED) 2 MG/2ML injection 0.5-2 mg  0.5-2 mg Q HOUR PRN Silvia Preston, A.P.N.   2 mg at 12/24/17 0017   • sodium bicarbonate 8.4 % injection 50 mEq  50 mEq Q HOUR PRN Silvia Preston, A.P.N.       • morphine (pf) 4 mg/ml injection 4 mg  4 mg Q HOUR PRN Silvia Preston, A.P.N.   4 mg at 12/23/17 0239   • ondansetron (ZOFRAN) syringe/vial  injection 4 mg  4 mg Q6HRS PRN Silvia Preston, A.P.N.        Or   • prochlorperazine (COMPAZINE) injection 10 mg  10 mg Q6HRS PRN Silvia Preston, A.P.N.        Or   • promethazine (PHENERGAN) suppository 25 mg  25 mg Q6HRS PRN Silvia Preston, A.P.N.       • acetaminophen (TYLENOL) tablet 650 mg  650 mg Q4HRS PRN Silvia Preston, A.P.N.   650 mg at 12/25/17 1910    Or   • acetaminophen (TYLENOL) suppository 650 mg  650 mg Q4HRS PRN Silvia Preston, A.P.N.       • mag hydrox-al hydrox-simeth (MAALOX PLUS ES or MYLANTA DS) suspension 30 mL  30 mL Q4HRS PRN Silvia Preston, A.P.N.       • diphenhydrAMINE (BENADRYL) tablet/capsule 25 mg  25 mg HS PRN - MR X 1 Silvia Preston A.P.N.   25 mg at 12/31/17 2212   • epinephrine 1 mg/mL(1:1000) 8 mg in  mL Infusion  0-0.2 mcg/kg/min Continuous Kiel Ash M.D. 23 mL/hr at 12/31/17 1613 0.04 mcg/kg/min at 12/31/17 1613   • propofol (DIPRIVAN) injection  0-80 mcg/kg/min Continuous Silvia Preston A.P.N. 18.4 mL/hr at 01/01/18 0200 20 mcg/kg/min at 01/01/18 0200   • albuterol inhaler 2 Puff  2 Puff Q4H PRN (RT) Kiel Ash M.D.       • alprazolam (XANAX) tablet 0.25 mg  0.25 mg Q6HRS PRN Radha Martinez A.P.N.   0.25 mg at 12/29/17 1202   • rosuvastatin (CRESTOR) tablet 20 mg  20 mg Q EVENING Silvia Preston A.P.N.   20 mg at 12/31/17 2144   • ipratropium-albuterol (DUONEB) nebulizer solution 3 mL  3 mL Q4H PRN (RT) Kiel Ash M.D.   3 mL at 12/23/17 1154     Last reviewed on 12/22/2017  7:07 AM by Katie Aly R.N.    Quality  Measures:  Labs reviewed, Medications reviewed and Radiology images reviewed                      Assessment/Plan:  Status post 2-vessel coronary artery bypass grafting on 12/22/2017.   - Status post IABP    - he remains on vasopressor therapy, I have actively titrated these   - chest tubes out   - Lasix drip, monitor volume status and hemodynamics closely   - Postoperative brief cardiac arrest, now awake and  following/sedated  Acute hypoxic respiratory failure secondary to above.   - intubated 12/22   - Continue full ventilator support today, he has not tolerated any ventilator weaning   - s/p trach 12/31   - serial ABG/CXR and vent mechanics review   - LTACH eval   - sedation vacations - try to swap Prop to DEX   - Mobilize when safe as tolerated  AF/RVR   - Amiodarone   - Digoxin   - Anti-coag   - ERP keep K > 4 and Mg > 2  Coronary artery disease with multivessel disease - ASA/full antiocoag  Pneumonia   - MSSA and Klebsiella identified   - Antibiotics de-escalated to Ancef, continue ×10 days - stop 1/5  Cardiomyopathy EF 35% and global systolic dysfunction - ionotropes/forced diuresis as needed  Severely dilated right ventricle - caution with volume excess  Chronic obstructive pulmonary disease - RT protocosl  Reported diabetes.   - ssi/NPH and q4 fsbs  Dyslipidemia - statin  Clinically with obstructive sleep apnea - slow removal of trach - PSG later?  Prophylaxis  LTACH consult    Prognosis guarded.  Critically ill with unstable cardio-pulmonary status on amiodarone/pressors and full vent support.  High risk of deterioration and worsening vital organ dysfunction and death without the above critical care interventions.    Discussed patient condition and risk of morbidity and/or mortality with Family, RN, RT, Pharmacy, Dietary, , Charge nurse / hot rounds and [facility].    The patient remains critically ill.  Critical care time = 40 minutes in directly providing and coordinating critical care and extensive data review.  No time overlap and excludes procedures.

## 2018-01-01 NOTE — PROGRESS NOTES
Cardiology Progress Note               Author: Jerry TOMÁS Amy Date & Time created: 2018  6:53 AM     Interval History:  Continues on 2 pressors  No issues overnight  Echo not performed post op  CVP 8-9 but with anasarca, JVD appears worse    Review of Systems   Unable to perform ROS: Critical illness       Physical Exam   Constitutional: He appears well-developed and well-nourished. No distress.   HENT:   Head: Normocephalic.   Mouth/Throat: Oropharynx is clear and moist.   Eyes: EOM are normal. Pupils are equal, round, and reactive to light. Right eye exhibits no discharge. Left eye exhibits no discharge. No scleral icterus.   Neck: Normal range of motion. Neck supple. No JVD present. No tracheal deviation present.   Cardiovascular: Normal rate, regular rhythm, S1 normal, S2 normal, normal heart sounds, intact distal pulses and normal pulses.  Exam reveals no gallop, no S3, no S4 and no friction rub.    No murmur heard.   No systolic murmur is present    No diastolic murmur is present   Pulses:       Carotid pulses are 2+ on the right side, and 2+ on the left side.       Radial pulses are 2+ on the right side, and 2+ on the left side.        Dorsalis pedis pulses are 2+ on the right side, and 2+ on the left side.        Posterior tibial pulses are 2+ on the right side, and 2+ on the left side.   Pulmonary/Chest: Effort normal and breath sounds normal. No respiratory distress. He has no wheezes. He has no rales.   Abdominal: Soft. Bowel sounds are normal. He exhibits no distension and no mass. There is no tenderness. There is no rebound and no guarding.   Musculoskeletal: He exhibits no edema.   Neurological: He is alert. No cranial nerve deficit.   Skin: Skin is warm and dry. He is not diaphoretic. No pallor.   Nursing note and vitals reviewed.      Hemodynamics:  Temp (24hrs), Av.2 °C (100.7 °F), Min:38 °C (100.4 °F), Max:38.5 °C (101.3 °F)  Temperature: 38 °C (100.4 °F)  Pulse  Av.1  Min: 0   Max: 221Heart Rate (Monitored): 83  NIBP: (!) 97/55  CVP (mm Hg): (!) 9 MM HG  Respiratory:  Leo Vent Mode: APVCMV, Rate (breaths/min): 26, PEEP/CPAP: 8, FiO2: 40, P Peak (PIP): 26, P MEAN: 14 Respiration: (!) 26, Pulse Oximetry: 93 %     Work Of Breathing / Effort: Vented  RUL Breath Sounds: Coarse Crackles, RML Breath Sounds: Diminished, RLL Breath Sounds: Diminished, GENNARO Breath Sounds: Coarse Crackles, LLL Breath Sounds: Diminished  Fluids:     Weight: (!) 151.3 kg (333 lb 8.9 oz)  GI/Nutrition:  No orders of the defined types were placed in this encounter.    Lab Results:  Recent Labs      12/30/17   0407  12/31/17   0500   WBC  27.6*  26.8*   RBC  3.42*  3.56*   HEMOGLOBIN  8.4*  8.6*   HEMATOCRIT  28.0*  28.8*   MCV  81.9  80.9*   MCH  24.6*  24.2*   MCHC  30.0*  29.9*   RDW  61.2*  60.0*   PLATELETCT  485*  540*   MPV  10.8  10.9     Recent Labs      12/30/17   0407   12/31/17   0500   12/31/17   1802  12/31/17   2352  01/01/18   0537   SODIUM  141   --   142   --    --    --   141   POTASSIUM  4.6   < >  3.8   < >  4.0  3.5*  3.5*   CHLORIDE  104   --   102   --    --    --   102   CO2  26   --   32   --    --    --   33   GLUCOSE  175*   --   123*   --    --    --   154*   BUN  44*   --   45*   --    --    --   47*   CREATININE  1.14   --   1.28   --    --    --   1.26   CALCIUM  7.8*   --   8.1*   --    --    --   8.3*    < > = values in this interval not displayed.     Recent Labs      12/29/17   0845   INR  1.29*             Recent Labs      12/30/17   0025   TRIGLYCERIDE  173*         Medical Decision Making, by Problem:  Active Hospital Problems    Diagnosis   • CAD (coronary artery disease) [I25.10]   • CHF (congestive heart failure) (CMS-HCC) [I50.9]       Plan:  53 yo M with CAD s/p CABG x 2, unable to bypass RCA, CHFrEF from ICM, unclear EF on 2 pressors.  We will try to wean his pressors off, might be getting septic, unclear what SVR is.      1. CAD s/p CABG    - cont asa    - increase rosuva  to 40    2. CHFrEF    - repeat echo    - consider RHC for guideance and SVR    - start dig 0.25 daily    - wean phenyl as tolerated    - stop propofol, switch to alternate sedation    3. HLD    - per above      Thank for you allowing me to take part in your patient's care, please call should you have any questions or would like to discuss this patient.    Quality-Core Measures

## 2018-01-01 NOTE — CARE PLAN
Problem: Ventilation Defect:  Goal: Ability to achieve and maintain unassisted ventilation or tolerate decreased levels of ventilator support    Intervention: Support and monitor invasive and noninvasive mechanical ventilation  Adult Ventilation Update    Total Vent Days: 10    Patient Lines/Drains/Airways Status    Active Airway     Name: Placement date: Placement time: Site: Days:    Airway Group Standard Cuffed Trach Tracheostomy 8.0 12/31/17   1106   Tracheostomy   2              CMV 26 540 +8 40%      Mobility Group  Activity Performed: Edge of bed (01/01/18 0000)  Time Activity Tolerated: 7 min (01/01/18 0000)  Distance Per Occurrence (ft.): 200 feet (12/21/17 2030)  # of Times Distance was Traveled: 2 (12/21/17 2030)  Assistance / Tolerance: Assistance of Two or More;General Weakness (01/01/18 0000)  Pt Calls for Assistance: No (01/01/18 0000)  Staff Present for Mobilization: RT;RN (x5) (01/01/18 0000)  Gait: Steady (12/21/17 2030)  Assistive Devices: Hand held assist (12/31/17 0000)  Reason Not Mobilized: Unstable condition (12/30/17 1903)  Mobilization Comments: requiring epi and jazz-synephrine and increased prop (12/30/17 1200)    Events/Summary/Plan:Stable on vent. No changes made this shift

## 2018-01-02 ENCOUNTER — APPOINTMENT (OUTPATIENT)
Dept: RADIOLOGY | Facility: MEDICAL CENTER | Age: 53
DRG: 003 | End: 2018-01-02
Attending: INTERNAL MEDICINE
Payer: MEDICARE

## 2018-01-02 LAB
ACTION RANGE TRIGGERED IACRT: NO
ANION GAP SERPL CALC-SCNC: 7 MMOL/L (ref 0–11.9)
BACTERIA SPEC RESP CULT: NORMAL
BASE EXCESS BLDA CALC-SCNC: 9 MMOL/L (ref -4–3)
BODY TEMPERATURE: ABNORMAL DEGREES
BUN SERPL-MCNC: 49 MG/DL (ref 8–22)
CALCIUM SERPL-MCNC: 8.1 MG/DL (ref 8.5–10.5)
CHLORIDE SERPL-SCNC: 101 MMOL/L (ref 96–112)
CO2 BLDA-SCNC: 33 MMOL/L (ref 20–33)
CO2 SERPL-SCNC: 31 MMOL/L (ref 20–33)
CREAT SERPL-MCNC: 1.18 MG/DL (ref 0.5–1.4)
ERYTHROCYTE [DISTWIDTH] IN BLOOD BY AUTOMATED COUNT: 60.6 FL (ref 35.9–50)
GFR SERPL CREATININE-BSD FRML MDRD: >60 ML/MIN/1.73 M 2
GLUCOSE BLD-MCNC: 186 MG/DL (ref 65–99)
GLUCOSE BLD-MCNC: 193 MG/DL (ref 65–99)
GLUCOSE BLD-MCNC: 195 MG/DL (ref 65–99)
GLUCOSE BLD-MCNC: 202 MG/DL (ref 65–99)
GLUCOSE SERPL-MCNC: 175 MG/DL (ref 65–99)
GRAM STN SPEC: NORMAL
HCO3 BLDA-SCNC: 32 MMOL/L (ref 17–25)
HCT VFR BLD AUTO: 30.2 % (ref 42–52)
HGB BLD-MCNC: 8.8 G/DL (ref 14–18)
INST. QUALIFIED PATIENT IIQPT: YES
LV EJECT FRACT  99904: 27
LV EJECT FRACT MOD 2C 99903: 47.15
LV EJECT FRACT MOD 4C 99902: 45.61
LV EJECT FRACT MOD BP 99901: 45.21
MCH RBC QN AUTO: 23.7 PG (ref 27–33)
MCHC RBC AUTO-ENTMCNC: 29.1 G/DL (ref 33.7–35.3)
MCV RBC AUTO: 81.2 FL (ref 81.4–97.8)
O2/TOTAL GAS SETTING VFR VENT: 40 %
PCO2 BLDA: 36.7 MMHG (ref 26–37)
PCO2 TEMP ADJ BLDA: 37.8 MMHG (ref 26–37)
PH BLDA: 7.55 [PH] (ref 7.4–7.5)
PH TEMP ADJ BLDA: 7.54 [PH] (ref 7.4–7.5)
PLATELET # BLD AUTO: 697 K/UL (ref 164–446)
PMV BLD AUTO: 10.2 FL (ref 9–12.9)
PO2 BLDA: 57 MMHG (ref 64–87)
PO2 TEMP ADJ BLDA: 60 MMHG (ref 64–87)
POTASSIUM SERPL-SCNC: 3.8 MMOL/L (ref 3.6–5.5)
POTASSIUM SERPL-SCNC: 3.9 MMOL/L (ref 3.6–5.5)
POTASSIUM SERPL-SCNC: 4.1 MMOL/L (ref 3.6–5.5)
RBC # BLD AUTO: 3.72 M/UL (ref 4.7–6.1)
SAO2 % BLDA: 93 % (ref 93–99)
SIGNIFICANT IND 70042: NORMAL
SITE SITE: NORMAL
SODIUM SERPL-SCNC: 139 MMOL/L (ref 135–145)
SOURCE SOURCE: NORMAL
SPECIMEN DRAWN FROM PATIENT: ABNORMAL
TRIGL SERPL-MCNC: 106 MG/DL (ref 0–149)
WBC # BLD AUTO: 28.7 K/UL (ref 4.8–10.8)

## 2018-01-02 PROCEDURE — 93306 TTE W/DOPPLER COMPLETE: CPT

## 2018-01-02 PROCEDURE — A9270 NON-COVERED ITEM OR SERVICE: HCPCS | Performed by: THORACIC SURGERY (CARDIOTHORACIC VASCULAR SURGERY)

## 2018-01-02 PROCEDURE — 700102 HCHG RX REV CODE 250 W/ 637 OVERRIDE(OP): Performed by: INTERNAL MEDICINE

## 2018-01-02 PROCEDURE — 700111 HCHG RX REV CODE 636 W/ 250 OVERRIDE (IP)

## 2018-01-02 PROCEDURE — 770022 HCHG ROOM/CARE - ICU (200)

## 2018-01-02 PROCEDURE — A9270 NON-COVERED ITEM OR SERVICE: HCPCS | Performed by: INTERNAL MEDICINE

## 2018-01-02 PROCEDURE — 700105 HCHG RX REV CODE 258: Performed by: NURSE PRACTITIONER

## 2018-01-02 PROCEDURE — 80048 BASIC METABOLIC PNL TOTAL CA: CPT

## 2018-01-02 PROCEDURE — 700102 HCHG RX REV CODE 250 W/ 637 OVERRIDE(OP): Performed by: THORACIC SURGERY (CARDIOTHORACIC VASCULAR SURGERY)

## 2018-01-02 PROCEDURE — 82962 GLUCOSE BLOOD TEST: CPT | Mod: 91

## 2018-01-02 PROCEDURE — 700101 HCHG RX REV CODE 250: Performed by: INTERNAL MEDICINE

## 2018-01-02 PROCEDURE — 36600 WITHDRAWAL OF ARTERIAL BLOOD: CPT

## 2018-01-02 PROCEDURE — 94150 VITAL CAPACITY TEST: CPT

## 2018-01-02 PROCEDURE — 700111 HCHG RX REV CODE 636 W/ 250 OVERRIDE (IP): Performed by: NURSE PRACTITIONER

## 2018-01-02 PROCEDURE — 84478 ASSAY OF TRIGLYCERIDES: CPT

## 2018-01-02 PROCEDURE — A9270 NON-COVERED ITEM OR SERVICE: HCPCS | Performed by: CLINICAL NURSE SPECIALIST

## 2018-01-02 PROCEDURE — 93306 TTE W/DOPPLER COMPLETE: CPT | Mod: 26 | Performed by: INTERNAL MEDICINE

## 2018-01-02 PROCEDURE — 700112 HCHG RX REV CODE 229: Performed by: CLINICAL NURSE SPECIALIST

## 2018-01-02 PROCEDURE — 85027 COMPLETE CBC AUTOMATED: CPT

## 2018-01-02 PROCEDURE — 700111 HCHG RX REV CODE 636 W/ 250 OVERRIDE (IP): Performed by: THORACIC SURGERY (CARDIOTHORACIC VASCULAR SURGERY)

## 2018-01-02 PROCEDURE — 82803 BLOOD GASES ANY COMBINATION: CPT

## 2018-01-02 PROCEDURE — 700111 HCHG RX REV CODE 636 W/ 250 OVERRIDE (IP): Performed by: CLINICAL NURSE SPECIALIST

## 2018-01-02 PROCEDURE — 700105 HCHG RX REV CODE 258: Performed by: THORACIC SURGERY (CARDIOTHORACIC VASCULAR SURGERY)

## 2018-01-02 PROCEDURE — 700102 HCHG RX REV CODE 250 W/ 637 OVERRIDE(OP): Performed by: CLINICAL NURSE SPECIALIST

## 2018-01-02 PROCEDURE — 94003 VENT MGMT INPAT SUBQ DAY: CPT

## 2018-01-02 PROCEDURE — 71045 X-RAY EXAM CHEST 1 VIEW: CPT

## 2018-01-02 PROCEDURE — 700111 HCHG RX REV CODE 636 W/ 250 OVERRIDE (IP): Performed by: INTERNAL MEDICINE

## 2018-01-02 PROCEDURE — 700105 HCHG RX REV CODE 258: Performed by: CLINICAL NURSE SPECIALIST

## 2018-01-02 PROCEDURE — 84132 ASSAY OF SERUM POTASSIUM: CPT | Mod: 91

## 2018-01-02 RX ORDER — FUROSEMIDE 10 MG/ML
80 INJECTION INTRAMUSCULAR; INTRAVENOUS
Status: DISCONTINUED | OUTPATIENT
Start: 2018-01-02 | End: 2018-01-05

## 2018-01-02 RX ORDER — ADENOSINE 3 MG/ML
INJECTION, SOLUTION INTRAVENOUS
Status: DISCONTINUED
Start: 2018-01-02 | End: 2018-01-02

## 2018-01-02 RX ORDER — POTASSIUM CHLORIDE 7.45 MG/ML
10 INJECTION INTRAVENOUS ONCE
Status: COMPLETED | OUTPATIENT
Start: 2018-01-02 | End: 2018-01-02

## 2018-01-02 RX ADMIN — POTASSIUM CHLORIDE 10 MEQ: 7.46 INJECTION, SOLUTION INTRAVENOUS at 12:51

## 2018-01-02 RX ADMIN — BISACODYL 10 MG: 10 SUPPOSITORY RECTAL at 08:31

## 2018-01-02 RX ADMIN — INSULIN HUMAN 5 UNITS: 100 INJECTION, SUSPENSION SUBCUTANEOUS at 05:12

## 2018-01-02 RX ADMIN — Medication 100 MCG/HR: at 07:42

## 2018-01-02 RX ADMIN — POTASSIUM BICARBONATE 50 MEQ: 25 TABLET, EFFERVESCENT ORAL at 05:20

## 2018-01-02 RX ADMIN — FAMOTIDINE 20 MG: 20 TABLET, FILM COATED ORAL at 08:31

## 2018-01-02 RX ADMIN — INSULIN HUMAN 5 UNITS: 100 INJECTION, SUSPENSION SUBCUTANEOUS at 22:51

## 2018-01-02 RX ADMIN — ROSUVASTATIN CALCIUM 40 MG: 10 TABLET, FILM COATED ORAL at 21:05

## 2018-01-02 RX ADMIN — LACTULOSE 30 ML: 20 SOLUTION ORAL at 08:31

## 2018-01-02 RX ADMIN — EPINEPHRINE 0.05 MCG/KG/MIN: 1 INJECTION, SOLUTION INTRAMUSCULAR; SUBCUTANEOUS at 15:45

## 2018-01-02 RX ADMIN — CHLORHEXIDINE GLUCONATE 15 ML: 1.2 RINSE ORAL at 08:31

## 2018-01-02 RX ADMIN — DEXMEDETOMIDINE HYDROCHLORIDE 0.8 MCG/KG/HR: 4 INJECTION, SOLUTION INTRAVENOUS at 04:12

## 2018-01-02 RX ADMIN — CEFAZOLIN SODIUM 2 G: 2 INJECTION, SOLUTION INTRAVENOUS at 21:04

## 2018-01-02 RX ADMIN — DEXMEDETOMIDINE HYDROCHLORIDE 0.9 MCG/KG/HR: 4 INJECTION, SOLUTION INTRAVENOUS at 23:40

## 2018-01-02 RX ADMIN — FUROSEMIDE 80 MG: 10 INJECTION, SOLUTION INTRAMUSCULAR; INTRAVENOUS at 15:52

## 2018-01-02 RX ADMIN — DIGOXIN 250 MCG: 125 TABLET ORAL at 16:44

## 2018-01-02 RX ADMIN — POTASSIUM CHLORIDE 10 MEQ: 7.46 INJECTION, SOLUTION INTRAVENOUS at 06:41

## 2018-01-02 RX ADMIN — STANDARDIZED SENNA CONCENTRATE AND DOCUSATE SODIUM 1 TABLET: 8.6; 5 TABLET, FILM COATED ORAL at 21:06

## 2018-01-02 RX ADMIN — CHLORHEXIDINE GLUCONATE 15 ML: 1.2 RINSE ORAL at 21:05

## 2018-01-02 RX ADMIN — DEXMEDETOMIDINE HYDROCHLORIDE 0.8 MCG/KG/HR: 4 INJECTION, SOLUTION INTRAVENOUS at 07:37

## 2018-01-02 RX ADMIN — AMIODARONE HYDROCHLORIDE 400 MG: 200 TABLET ORAL at 21:05

## 2018-01-02 RX ADMIN — ENOXAPARIN SODIUM 150 MG: 150 INJECTION SUBCUTANEOUS at 08:38

## 2018-01-02 RX ADMIN — AMIODARONE HYDROCHLORIDE 1 MG/MIN: 50 INJECTION, SOLUTION INTRAVENOUS at 17:14

## 2018-01-02 RX ADMIN — AMIODARONE HYDROCHLORIDE 400 MG: 200 TABLET ORAL at 08:31

## 2018-01-02 RX ADMIN — POTASSIUM CHLORIDE 10 MEQ: 2 INJECTION, SOLUTION, CONCENTRATE INTRAVENOUS at 00:32

## 2018-01-02 RX ADMIN — FAMOTIDINE 20 MG: 20 TABLET, FILM COATED ORAL at 21:05

## 2018-01-02 RX ADMIN — POTASSIUM BICARBONATE 50 MEQ: 25 TABLET, EFFERVESCENT ORAL at 16:44

## 2018-01-02 RX ADMIN — FUROSEMIDE 80 MG: 10 INJECTION, SOLUTION INTRAMUSCULAR; INTRAVENOUS at 08:31

## 2018-01-02 RX ADMIN — POTASSIUM CHLORIDE 10 MEQ: 7.46 INJECTION, SOLUTION INTRAVENOUS at 21:05

## 2018-01-02 RX ADMIN — POTASSIUM BICARBONATE 50 MEQ: 25 TABLET, EFFERVESCENT ORAL at 00:13

## 2018-01-02 RX ADMIN — DIPHENHYDRAMINE HCL 25 MG: 25 TABLET ORAL at 00:13

## 2018-01-02 RX ADMIN — CEFAZOLIN SODIUM 2 G: 2 INJECTION, SOLUTION INTRAVENOUS at 05:20

## 2018-01-02 RX ADMIN — DEXMEDETOMIDINE HYDROCHLORIDE 0.8 MCG/KG/HR: 4 INJECTION, SOLUTION INTRAVENOUS at 00:34

## 2018-01-02 RX ADMIN — PHENYLEPHRINE HYDROCHLORIDE 12 MCG/MIN: 10 INJECTION INTRAVENOUS at 12:41

## 2018-01-02 RX ADMIN — POTASSIUM BICARBONATE 50 MEQ: 25 TABLET, EFFERVESCENT ORAL at 23:41

## 2018-01-02 RX ADMIN — CEFAZOLIN SODIUM 2 G: 2 INJECTION, SOLUTION INTRAVENOUS at 14:39

## 2018-01-02 RX ADMIN — POTASSIUM BICARBONATE 50 MEQ: 25 TABLET, EFFERVESCENT ORAL at 11:54

## 2018-01-02 RX ADMIN — ASPIRIN 81 MG: 81 TABLET, CHEWABLE ORAL at 08:31

## 2018-01-02 RX ADMIN — DEXMEDETOMIDINE HYDROCHLORIDE 0.5 MCG/KG/HR: 4 INJECTION, SOLUTION INTRAVENOUS at 18:29

## 2018-01-02 RX ADMIN — DEXMEDETOMIDINE HYDROCHLORIDE 0.8 MCG/KG/HR: 4 INJECTION, SOLUTION INTRAVENOUS at 10:31

## 2018-01-02 RX ADMIN — INSULIN HUMAN 5 UNITS: 100 INJECTION, SUSPENSION SUBCUTANEOUS at 14:39

## 2018-01-02 RX ADMIN — DEXMEDETOMIDINE HYDROCHLORIDE 0.5 MCG/KG/HR: 4 INJECTION, SOLUTION INTRAVENOUS at 13:50

## 2018-01-02 RX ADMIN — DOCUSATE SODIUM 100 MG: 100 CAPSULE ORAL at 08:31

## 2018-01-02 ASSESSMENT — PULMONARY FUNCTION TESTS: FVC: 1.1

## 2018-01-02 NOTE — CARE PLAN
Problem: Ventilation Defect:  Goal: Ability to achieve and maintain unassisted ventilation or tolerate decreased levels of ventilator support  Outcome: PROGRESSING AS EXPECTED    Intervention: Support and monitor invasive and noninvasive mechanical ventilation  Adult Ventilation Update    Total Vent Days: 11  Trach day: 3    Patient Lines/Drains/Airways Status    Active Airway     Name: Placement date: Placement time: Site: Days:    Airway Group Standard Cuffed Trach Tracheostomy 8.0 12/31/17   1106   Tracheostomy   3              Vent settings: 26 540 8 40%      Sputum/Suction   Cough: Productive (01/02/18 0400)  Sputum Amount: Moderate (01/02/18 0400)  Sputum Color: Bloody;Tan (01/02/18 0400)  Sputum Consistency: Thick (01/02/18 0400)    Mobility Group  Activity Performed: Edge of bed (01/02/18 0000)  Time Activity Tolerated: 10 min (01/02/18 0000)  Distance Per Occurrence (ft.): 200 feet (12/21/17 2030)  # of Times Distance was Traveled: 2 (12/21/17 2030)  Assistance / Tolerance: Assistance of Two or More;General Weakness;Tolerates Well (x4) (01/02/18 0000)  Pt Calls for Assistance: No (01/02/18 0000)  Staff Present for Mobilization: RN (01/02/18 0000)  Gait: Steady (12/21/17 2030)  Assistive Devices: Hand held assist (01/01/18 0800)  Reason Not Mobilized: Unstable condition (12/30/17 1903)  Mobilization Comments: Pt unable to participate (01/01/18 0800)    Events/Summary/Plan: ABG drawn. Pt stable on vent, no changes made.

## 2018-01-02 NOTE — DISCHARGE PLANNING
Medical SW    Referral: Sw met w/ pt's wife.    Intervention: Wife completed choice form for pt so he can go to [facility] near his dtr's home in [place]. Wife is concerned about transportation for her but knows her dtr will visit pt.    Sw faxed choice form to [facility]. Sw received fax confirmation.     Plan: Sw to assist w/ d/c planning as needed.

## 2018-01-02 NOTE — PROGRESS NOTES
Cardiology Progress Note               Author: Jerry Reyes Date & Time created: 2018  6:27 AM     Interval History:  Continues in a-fib  Pressors coming down  Awake and alert        Review of Systems   Unable to perform ROS: Intubated       Physical Exam   Constitutional: He appears well-developed and well-nourished. No distress.   HENT:   Head: Normocephalic.   Mouth/Throat: Oropharynx is clear and moist.   Eyes: EOM are normal. Pupils are equal, round, and reactive to light. Right eye exhibits no discharge. Left eye exhibits no discharge. No scleral icterus.   Neck: Normal range of motion. Neck supple. No JVD present. No tracheal deviation present.   Cardiovascular: Normal rate, regular rhythm, S1 normal, S2 normal, normal heart sounds, intact distal pulses and normal pulses.  Exam reveals no gallop, no S3, no S4 and no friction rub.    No murmur heard.   No systolic murmur is present    No diastolic murmur is present   Pulses:       Carotid pulses are 2+ on the right side, and 2+ on the left side.       Radial pulses are 2+ on the right side, and 2+ on the left side.        Dorsalis pedis pulses are 2+ on the right side, and 2+ on the left side.        Posterior tibial pulses are 2+ on the right side, and 2+ on the left side.   Pulmonary/Chest: Effort normal and breath sounds normal. No respiratory distress. He has no wheezes. He has no rales.   Abdominal: Soft. Bowel sounds are normal. He exhibits no distension and no mass. There is no tenderness. There is no rebound and no guarding.   Musculoskeletal: He exhibits no edema.   Neurological: No cranial nerve deficit.   Skin: Skin is warm and dry. He is not diaphoretic. No pallor.   Nursing note and vitals reviewed.      Hemodynamics:  Temp (24hrs), Av.1 °C (100.5 °F), Min:37.8 °C (100 °F), Max:38.2 °C (100.8 °F)  Temperature: 37.8 °C (100 °F)  Pulse  Av  Min: 0  Max: 221Heart Rate (Monitored): 73  NIBP: 101/54  CVP (mm Hg): (!) 9 MM  HG  Respiratory:  Leo Vent Mode: APVCMV, Rate (breaths/min): 26, PEEP/CPAP: 8, FiO2: 40, P Peak (PIP): 24, P MEAN: 13 Respiration: (!) 26, Pulse Oximetry: 93 %     Work Of Breathing / Effort: Vented  RUL Breath Sounds: Diminished, RML Breath Sounds: Diminished, RLL Breath Sounds: Diminished, GENNARO Breath Sounds: Diminished, LLL Breath Sounds: Diminished  Fluids:       GI/Nutrition:  No orders of the defined types were placed in this encounter.    Lab Results:  Recent Labs      12/31/17   0500  01/01/18   0537  01/02/18   0510   WBC  26.8*  26.9*  28.7*   RBC  3.56*  3.49*  3.72*   HEMOGLOBIN  8.6*  8.3*  8.8*   HEMATOCRIT  28.8*  28.4*  30.2*   MCV  80.9*  81.4  81.2*   MCH  24.2*  23.8*  23.7*   MCHC  29.9*  29.2*  29.1*   RDW  60.0*  60.6*  60.6*   PLATELETCT  540*  620*  697*   MPV  10.9  10.3  10.2     Recent Labs      12/31/17   0500   01/01/18   0537   01/01/18   1732  01/01/18   2330  01/02/18   0510   SODIUM  142   --   141   --    --    --   139   POTASSIUM  3.8   < >  3.5*   < >  4.3  4.0  3.8   CHLORIDE  102   --   102   --    --    --   101   CO2  32   --   33   --    --    --   31   GLUCOSE  123*   --   154*   --    --    --   175*   BUN  45*   --   47*   --    --    --   49*   CREATININE  1.28   --   1.26   --    --    --   1.18   CALCIUM  8.1*   --   8.3*   --    --    --   8.1*    < > = values in this interval not displayed.                 Recent Labs      01/02/18   0510   TRIGLYCERIDE  106         Medical Decision Making, by Problem:  Active Hospital Problems    Diagnosis   • CAD (coronary artery disease) [I25.10]   • CHF (congestive heart failure) (CMS-[facility]) [I50.9]       Plan:  53 yo M with CAD s/p CABG x 2, unable to bypass RCA, CHFrEF from ICM, unclear EF on 2 pressors.  WBC count is increasing concerning for infection which might explain the pressors needs.  If he continues in a-fib, we will likely DC cardiovert tomorrow however we will hold off as the likelihood of maintaining NSR are not  high.       1. CAD s/p CABG    - cont asa    - cont rosuva to 40     2. CHFrEF    - repeat echo    - cont dig 0.25 daily    - wean phenyl as tolerated     3. HLD    - per above    4. Hypotension, shock    - defer to primary team      Reviewed items::  EKG reviewed, Radiology images reviewed, Labs reviewed and Medications reviewed  Ceballos catheter::  Critically Ill - Requiring Accurate Measurement of Urinary Output  DVT prophylaxis pharmacological::  Heparin  DVT prophylaxis - mechanical:  SCDs

## 2018-01-02 NOTE — CARE PLAN
Problem: Communication  Goal: The ability to communicate needs accurately and effectively will improve  Outcome: PROGRESSING AS EXPECTED  Pt able to communicate via mouthing words and nodding. Pt nods head appropriately to education.     Problem: Infection  Goal: Will remain free from infection  Outcome: PROGRESSING AS EXPECTED    Intervention: Assess signs and symptoms of infection  Pt remains intermittently febrile. Ice packs in place. MDs aware.   Intervention: Implement standard precautions and perform hand washing before and after patient contact  All visitors and employees foam in and out of pt's room

## 2018-01-02 NOTE — PROGRESS NOTES
Pulmonary Critical Care Progress Note      DOS:  1/2/18, admit 12/19/2017    Chief Complaint: CAD, 2V CABG    History of Present Illness: 51 y/o M, h/o DM, COPD, DLD, CAD, SHASHANK, Obesity; underwent 2v CABG 12/22    Interval Events:  24 hour interval history reviewed   Reason for visit:  A&C respiratory failure, CAD s/p CABG, AF/flut, Low K/Mg, S/p trach 12/31    Sedate on vent - follows some  Trach ok  SBT 1 hr RSBI 59  SBT 1 hr x2 yesterday  CXR better  - less eff/atx on R - L effusion worse?  DEX 0.8  Fent 100  Afib/flutter - rate 70-80s  CVP 7-12  Lasix drip -> Lasix 80 BID IV  Amio to PO  KRISTIN 10  EPI 0.05  Tm 38.3  TF goal   UO adequate - I/Os neg 856  Full dose lovenox - last dose this AM  Ancef 7/10    D/w family at bedside    F/u in afternoon - WOB ok  AF -> SR with tons of atrial and ventricular ectopy  Amiodarone bolus and drip restarted - PO loading continued as well  K/Mg labs ordered       YESTERDAY  Sedate on vent  Follows some - less agitated  AF/flut - HR 80s  Bp ok on pressors  Tm 37.9  Trach not bleeding  KRISTIN 50  EPI 0.03  Amio 0.5  Fent 100  Prop 20  Lasix 10/hr drip  Full dose Lovenox  Ancef 6/10  K 3.5  Check Mg  CXR wet/BSSLLA - no change vs old film  Therapies    YESTERDAY   - AF/flutter, , amio .5   - epi, kristin gtts   - lasix gtt   - I/O 4.7/6.5   - vent day 9, min secretions   - cxr unchagned   - CTs out     - Lovenox 150 bid, held for trach    - day 5 of 10 Ancef for MSSA, kleb BAL   - Cr up 1.28   - perc trach today  Yesterday   - POD8   - arouses, follows, restless, prop 60   - AF/flutter, 120's   - amiodarone, gtt .5   - epi gtt, kristin gtt   - lasix gtt 10/hr   - williams TF at 80   - good UOP, I/O 5.5/7.8   - Tm 38.3   - full dose lovenox   - RUE DVT   - vent day 9, 8, 40%; no SBT, cxr edema, atx unchanged   - ancef day 2 for kleb/mssa pna   - will need trach  :  Review of Systems   Unable to perform ROS: Acuity of condition       Physical Exam   Constitutional: He appears well-developed and  well-nourished.   HENT:   Head: Normocephalic and atraumatic.   Eyes: Pupils are equal, round, and reactive to light. No scleral icterus.   Neck: No JVD present. No tracheal deviation present.   Cardiovascular: Normal rate and intact distal pulses.  Exam reveals no gallop and no friction rub.    No murmur heard.  Pulmonary/Chest: No respiratory distress. He has rales.   Diminished at bases L>R, scattered coarse crackles, no wheezes - better on R   Abdominal: Soft. He exhibits no distension. There is no tenderness. There is no rebound and no guarding.   Musculoskeletal: He exhibits edema. He exhibits no deformity.   Trace edema   Neurological: No cranial nerve deficit.   sedate, now following some commands intermittently, moves all extremities   Skin: Skin is warm and dry.   Psychiatric:   sedate   Nursing note and vitals reviewed.        PFSH:  No change.    Respiratory:  Leo Vent Mode: APVCMV, Rate (breaths/min): 26, Vt Target (mL): 540, PEEP/CPAP: 8, FiO2: 40, Static Compliance (ml / cm H2O): 47, Control VTE (exp VT): 538  Pulse Oximetry: 93 %     Ventilator mechanics and waveforms are reviewed at the bedside with RT    Chest Tube Drains:            Recent Labs      01/01/18   0417  01/02/18   0451   ISTATAPH  7.565*  7.549*   ISTATAPCO2  36.9  36.7   ISTATAPO2  57*  57*   ISTATATCO2  35*  33   [id number]  93  93   ISTATARTHCO3  33.4*  32.0*   ISTATARTBE  10*  9*   ISTATTEMP  38.0 C  37.7 C   ISTATFIO2  40  40   ISTATSPEC  Arterial  Arterial   ISTATAPHTC  7.549*  7.538*   CAVSAICB2XQ  61*  60*       HemoDynamics:  Pulse: 76, Heart Rate (Monitored): 73  NIBP: 101/54  CVP (mm Hg): (!) 9 MM HG            Neuro:  GCS Total Wakeman Coma Score: 11         Fluids:  Intake/Output       12/31/17 0700 - 01/01/18 0659 01/01/18 0700 - 01/02/18 0659 01/02/18 0700 - 01/03/18 0659      1641-4532 5329-7254 Total [id number] 1900-0659 Total [id number] 1900-0659 Total       Intake    I.V.  1299.5  1245.7 2545.2  750.8  752.7  1503.4  --  -- --    Precedex Volume -- -- -- 258.3 383 641.2 -- -- --    Amiodarone Volume 136 -- 136 -- -- -- -- -- --    Phenylephrine Volume 225.6 225.6 451.2 177.3 89.9 267.1 -- -- --    Propofol Volume 204.6 264.1 468.7 133.9 -- 133.9 -- -- --    Epinephrine Volume 273.3 276 549.3 181.4 259.8 441.1 -- -- --    IV Piggyback Volume 300 400 700 -- -- -- -- -- --    NS TKO 40 60 100 -- -- -- -- -- --    IV Volume (Fentanyl ) -- 20 20 -- 20 20 -- -- --    IV Volume (Lasix) 120 -- 120 -- -- -- -- -- --    Other  --  210 210  --  210 210  --  -- --    Medications (P.O./ Enteral Liquids) -- 210 210 -- 210 210 -- -- --    Enteral  580  1290 1870  800  1210 2010  --  -- --    Enteral Volume   -- -- --    Free Water / Tube Flush 20 330 350 -- 360 360 -- -- --    Total Intake 1879.5 2745.7 4625.2 1550.8 2172.7 3723.4 -- -- --       Output    Urine  2450  2700 5150  2330  1950 4280  --  -- --    Indwelling Cathether 2450 2700 5150 2330 1950 4280 -- -- --    Stool  --  -- --  --  -- --  --  -- --    Number of Times Stooled -- 1 x 1 x -- -- -- -- -- --    Total Output 2450 2700 5150 2330 1950 4280 -- -- --       Net I/O     -570.5 45.7 -524.8 -779.2 222.7 -556.6 -- -- --          Recent Labs      12/31/17   0500   01/01/18   0537  01/01/18   1215  01/01/18   1732  01/01/18   2330   SODIUM  142   --   141   --    --    --    POTASSIUM  3.8   < >  3.5*  3.7  4.3  4.0   CHLORIDE  102   --   102   --    --    --    CO2  32   --   33   --    --    --    BUN  45*   --   47*   --    --    --    CREATININE  1.28   --   1.26   --    --    --    MAGNESIUM   --    --    --   2.2   --    --    CALCIUM  8.1*   --   8.3*   --    --    --     < > = values in this interval not displayed.       GI/Nutrition:  TF goal  Liver Function  Recent Labs      12/31/17   0500 12/31/17 2352 01/01/18   0537   PREALBUMIN   --   9.0*   --    GLUCOSE  123*   --   154*       Heme:  Recent Labs      12/31/17 0500 01/01/18    0537   RBC  3.56*  3.49*   HEMOGLOBIN  8.6*  8.3*   HEMATOCRIT  28.8*  28.4*   PLATELETCT  540*  620*       Infectious Disease:  Temp  Av.1 °C (100.5 °F)  Min: 37.8 °C (100 °F)  Max: 38.2 °C (100.8 °F)  Micro: reviewed  Recent Labs      17   0500  18   0537   WBC  26.8*  26.9*     Current Facility-Administered Medications   Medication Dose Frequency Provider Last Rate Last Dose   • rosuvastatin (CRESTOR) tablet 40 mg  40 mg Q EVENING Jerry Reyes M.D.   40 mg at 18   • digoxin (LANOXIN) tablet 250 mcg  250 mcg DAILY AT 1800 Jerry Reyes M.D.   250 mcg at 18 1719   • dexmedetomidine (PRECEDEX) 400 mcg/100mL premix infusion  0.1-1.5 mcg/kg/hr Continuous Jerry Reyes M.D. 30.3 mL/hr at 18 0412 0.8 mcg/kg/hr at 18 0412   • amiodarone (CORDARONE) tablet 400 mg  400 mg TWICE DAILY Gui Delcid M.D.   400 mg at 18   • docusate sodium 100mg/10mL (COLACE) solution 100 mg  100 mg DAILY Silvia Preston, A.P.N.   100 mg at 18 0754    Or   • docusate sodium (COLACE) capsule 100 mg  100 mg DAILY Silvia Preston, A.P.N.       • enoxaparin (LOVENOX) injection 150 mg  1 mg/kg Q12HRS Amrik Banegas M.D.   150 mg at 18   • ceFAZolin (ANCEF) IVPB 2 g  2 g Q8HRS Amrik Banegas M.D.   2 g at 18 0520   • amiodarone (CORDARONE) 450 mg in D5W 250 mL Infusion  0.5-1 mg/min Continuous Amrik Banegas M.D. 17 mL/hr at 18 1322 0.5 mg/min at 18 1322   • potassium bicarbonate (KLYTE) 25 MEQ effervescent tablet TBEF 50 mEq  50 mEq Q6HRS Amrik Banegas M.D.   50 mEq at 18 0520   • aspirin (ASA) chewable tab 81 mg  81 mg DAILY Kiel Ash M.D.   81 mg at 18 0749   • tetrahydrozoline (VISINE) 0.05 % ophthalmic solution 1 Drop  1 Drop 4X/DAY PRN Amrik Banegas M.D.       • furosemide (LASIX) 100 mg in  mL infusion  10 mg/hr Continuous Radha Martinez, A.P.N. 10 mL/hr at 18 2305 10 mg/hr at 18 2305   •  K+ Scale: Goal of 4.5  1 Each Q6HRS KAMALA AlfredP.N.   1 Each at 01/02/18 0000   • insulin NPH (HUMULIN,NOVOLIN) injection 5 Units  5 Units Q8HRS Silvia Preston A.P.N.   5 Units at 01/02/18 0512   • phenylephrine (KRISTIN-SYNEPHRINE) 80,000 mcg in  mL Infusion  0-50 mcg/min Continuous KAMALA AlfredP.N. 4.5 mL/hr at 01/02/18 0316 12 mcg/min at 01/02/18 0316   • famotidine (PEPCID) tablet 20 mg  20 mg BID Rodolfo Landa M.D.   20 mg at 01/01/18 2005   • chlorhexidine (PERIDEX) 0.12 % solution 15 mL  15 mL BID Rodolfo Landa M.D.   15 mL at 01/01/18 2005   • fentaNYL (SUBLIMAZE) 50 mcg/mL in 50mL (Continuous Infusion)   Continuous Rodolfo Landa M.D. 2 mL/hr at 01/01/18 1110 100 mcg/hr at 01/01/18 1110   • insulin lispro (HUMALOG) injection 0-15 Units  0-15 Units Q6HRS KAMALA AlfredP.N.   3 Units at 01/02/18 0512   • Respiratory Care per Protocol   Continuous RT Silvia Preston A.P.N.       • NS infusion   Continuous SUSI Mendoza.P.N. 10 mL/hr at 01/01/18 0022     • Pharmacy Consult Request ...Pain Management Review 1 Each  1 Each PRN Silvia Preston A.P.N.       • senna-docusate (PERICOLACE or SENOKOT S) 8.6-50 MG per tablet 1 Tab  1 Tab Nightly SUSI Mendoza.P.N.   1 Tab at 01/01/18 2005    And   • senna-docusate (PERICOLACE or SENOKOT S) 8.6-50 MG per tablet 1 Tab  1 Tab Q24HRS PRN Silvia Preston A.P.N.   1 Tab at 12/28/17 0732    And   • lactulose 20 GM/30ML solution 30 mL  30 mL Q24HRS PRN Silvia Preston, A.P.N.   30 mL at 12/31/17 2142    And   • bisacodyl (DULCOLAX) suppository 10 mg  10 mg Q24HRS PRN Silvia Preston A.P.N.   10 mg at 12/26/17 2200    And   • fleet enema 133 mL  1 Each Once PRN Silvia Preston A.P.N.       • electrolyte-A (PLASMALYTE-A) infusion   PRN Silvia Preston A.P.N.       • oxycodone immediate release (ROXICODONE) tablet 5 mg  5 mg Q3HRS PRN Silvia Preston, A.P.N.       • oxycodone immediate release (ROXICODONE) tablet 10 mg  10 mg Q3HRS PRN Silvia AMES  Mohan, A.P.N.   10 mg at 12/29/17 1804   • morphine (pf) 4 mg/ml injection 4 mg  4 mg Q3HRS PRN Silvia Preston A.P.N.       • tramadol (ULTRAM) 50 MG tablet 50 mg  50 mg Q4HRS PRN Silvia Preston, A.P.N.       • sodium bicarbonate 8.4 % injection 50 mEq  50 mEq Q HOUR PRN Silvia Preston A.P.N.       • morphine (pf) 4 mg/ml injection 4 mg  4 mg Q HOUR PRN Silvia Preston, A.P.N.   4 mg at 12/23/17 0239   • ondansetron (ZOFRAN) syringe/vial injection 4 mg  4 mg Q6HRS PRN Silvia Preston A.P.N.        Or   • prochlorperazine (COMPAZINE) injection 10 mg  10 mg Q6HRS PRN Silvia Preston, A.P.N.        Or   • promethazine (PHENERGAN) suppository 25 mg  25 mg Q6HRS PRN Silvia Preston A.P.N.       • acetaminophen (TYLENOL) tablet 650 mg  650 mg Q4HRS PRN Silvia Preston A.P.N.   650 mg at 01/01/18 1840    Or   • acetaminophen (TYLENOL) suppository 650 mg  650 mg Q4HRS PRN Silvia Preston, A.P.N.       • mag hydrox-al hydrox-simeth (MAALOX PLUS ES or MYLANTA DS) suspension 30 mL  30 mL Q4HRS PRN Silvia Preston A.P.N.   30 mL at 01/01/18 0753   • diphenhydrAMINE (BENADRYL) tablet/capsule 25 mg  25 mg HS PRN - MR X 1 Silvia Preston A.P.N.   25 mg at 01/02/18 0013   • epinephrine 1 mg/mL(1:1000) 8 mg in  mL Infusion  0-0.2 mcg/kg/min Continuous Kiel Ash M.D. 28.8 mL/hr at 01/02/18 0054 0.05 mcg/kg/min at 01/02/18 0054   • albuterol inhaler 2 Puff  2 Puff Q4H PRN (RT) Kiel Ash M.D.       • ipratropium-albuterol (DUONEB) nebulizer solution 3 mL  3 mL Q4H PRN (RT) Kiel Ash M.D.   3 mL at 12/23/17 1154     Last reviewed on 12/22/2017  7:07 AM by Katie Aly R.N.    Quality  Measures:  Labs reviewed, Medications reviewed and Radiology images reviewed                      Assessment/Plan:  Status post 2-vessel coronary artery bypass grafting on 12/22/2017.   - Status post IABP    - he remains on vasopressor therapy, I have actively titrated these again   - chest tubes out   - Lasix drip,  monitor volume status and hemodynamics closely   - Postoperative brief cardiac arrest, now awake and following/sedated  Acute hypoxic respiratory failure secondary to above.   - intubated 12/22   - Continue full ventilator support today, SBTs as c linically appropriate   - s/p trach 12/31   - serial ABG/CXR and vent mechanics review   - LTACH eval   - sedation vacations - try to swap Prop to DEX - lighten sedation   - Mobilize when safe as tolerated  ABN CXR - bilateral effusion/atx    - L effuison worse - L thoracentesis - hold dose Lovenox - tap in AM by IR   - monitor for need for Tx FOB after thoracentesis  AF/RVR   - Amiodarone   - Digoxin   - Anti-coag   - ERP keep K > 4 and Mg > 2  Coronary artery disease with multivessel disease - ASA/full antiocoag  Pneumonia   - MSSA and Klebsiella identified   - Antibiotics de-escalated to Ancef, continue ×10 days - stop 1/5  Cardiomyopathy EF 35% and global systolic dysfunction - ionotropes/forced diuresis as needed  Severely dilated right ventricle - caution with volume excess  Chronic obstructive pulmonary disease - RT protocosl  Reported diabetes.   - ssi/NPH and q6 FS  Dyslipidemia - statin  Clinically with obstructive sleep apnea - slow removal of trach - PSG later?  Prophylaxis  LTACH consult pending - not ready yet    Prognosis guarded.  Critically ill with unstable cardio-pulmonary status on amiodarone/IV pressors and full vent support.  High risk of deterioration and worsening vital organ dysfunction and death without the above critical care interventions.    Discussed patient condition and risk of morbidity and/or mortality with Family, RN, RT, Pharmacy, Dietary, , Charge nurse / hot rounds and [facility].    The patient remains critically ill.  Critical care time = 35 minutes in directly providing and coordinating critical care and extensive data review.  No time overlap and excludes procedures.

## 2018-01-02 NOTE — PROGRESS NOTES
Cardiovascular Surgery Progress Note    Name: Joshua Medrano  MRN: [id number]  : 1965  Admit Date: 2017 10:21 AM  Procedure:  Procedure(s) and Anesthesia Type:     * MULTIPLE CORONARY ARTERY BYPASS ENDO VEIN HARVEST X2 - General     * JIM - General  11 Day Post-Op    Vitals:  Patient Vitals for the past 8 hrs:   Temp SpO2 O2 Delivery Pulse Heart Rate (Monitored) Resp NIBP Weight   18 0815 - 95 % - 79 81 (!) 26 (!) 93/48 -   18 0802 - 95 % - - 86 - - -   18 0800 37.7 °C (99.9 °F) 96 % - 82 82 (!) 23 100/65 -   18 0745 - 94 % - 82 86 (!) 26 (!) 97/53 -   18 0730 - 95 % - 86 82 (!) 23 (!) 95/57 -   18 0715 - 92 % - 86 89 19 (!) 99/53 -   18 0700 - 94 % - 83 84 (!) 25 100/58 -   18 0647 - 97 % - - 83 - - -   18 0630 - - - - - - 100/52 (!) 148.9 kg (328 lb 4.2 oz)   18 0615 - - - - - - (!) 98/59 -   18 0600 - 95 % - 75 76 (!) 26 (!) 99/53 -   18 0545 - - - - - - (!) 96/51 -   18 0530 - - - - - - (!) 98/52 -   18 0515 - - - - - - (!) 91/50 -   18 0500 - 93 % - 78 78 (!) 26 102/56 -   18 0455 - 93 % - - 73 - - -   18 0430 - - - - - - 100/54 -   18 0415 - - - - - - 105/55 -   18 0400 - 94 % Ventilator 72 74 (!) 26 102/52 -   18 0345 - - - - - - 102/58 -   18 0330 - - - - - - (!) 98/55 -   18 0315 - - - - - - 105/57 -   18 0300 - 95 % - 76 83 (!) 26 101/54 -   18 0245 - - - - - - 106/64 -   18 0230 - - - - - - 107/60 -   18 0215 - - - - - - 106/61 -   18 0200 37.8 °C (100 °F) 96 % - 79 80 (!) 26 107/55 -     Temp (24hrs), Av.9 °C (100.3 °F), Min:37.7 °C (99.9 °F), Max:38.2 °C (100.8 °F)      Respiratory:  Leo Vent Mode: APVCMV, Rate (breaths/min): 26, PEEP/CPAP: 8, FiO2: 30, P Peak (PIP): 22, P MEAN: 14 Respiration: (!) 26, Pulse Oximetry: 95 %     Chest Tube Drains:          Fluids:    Intake/Output Summary (Last 24 hours) at 18  0956  Last data filed at 01/02/18 0800   Gross per 24 hour   Intake          3745.29 ml   Output             5130 ml   Net         -1384.71 ml     Admit weight: Weight: (!) 159.2 kg (350 lb 15.6 oz)  Current weight: Weight: (!) 148.9 kg (328 lb 4.2 oz) (01/02/18 0630)    Labs:  Recent Labs      12/31/17   0500  01/01/18   0537  01/02/18   0510   WBC  26.8*  26.9*  28.7*   RBC  3.56*  3.49*  3.72*   HEMOGLOBIN  8.6*  8.3*  8.8*   HEMATOCRIT  28.8*  28.4*  30.2*   MCV  80.9*  81.4  81.2*   MCH  24.2*  23.8*  23.7*   MCHC  29.9*  29.2*  29.1*   RDW  60.0*  60.6*  60.6*   PLATELETCT  540*  620*  697*   MPV  10.9  10.3  10.2         Recent Labs      12/31/17   0500   01/01/18   0537   01/01/18   1732  01/01/18   2330  01/02/18   0510   SODIUM  142   --   141   --    --    --   139   POTASSIUM  3.8   < >  3.5*   < >  4.3  4.0  3.8   CHLORIDE  102   --   102   --    --    --   101   CO2  32   --   33   --    --    --   31   GLUCOSE  123*   --   154*   --    --    --   175*   BUN  45*   --   47*   --    --    --   49*   CREATININE  1.28   --   1.26   --    --    --   1.18   CALCIUM  8.1*   --   8.3*   --    --    --   8.1*    < > = values in this interval not displayed.           Medications:  • furosemide  80 mg     • rosuvastatin  40 mg     • digoxin  250 mcg     • amiodarone  400 mg     • docusate sodium 100mg/10mL  100 mg      Or   • docusate sodium  100 mg     • enoxaparin (LOVENOX) injection  1 mg/kg     • ceFAZolin  2 g     • potassium bicarbonate  50 mEq     • aspirin  81 mg     • K+ Scale: Goal of 4.5  1 Each     • insulin NPH  5 Units     • famotidine  20 mg     • chlorhexidine  15 mL     • insulin lispro  0-15 Units     • senna-docusate  1 Tab         Exam:   Review of Systems   Unable to perform ROS: Intubated       Physical Exam   Constitutional: No distress. He is intubated.   Morbidly obese   Neck: Neck supple.   Cardiovascular: Normal rate and regular rhythm.  Exam reveals no gallop.    No murmur  heard.  Pulmonary/Chest: Effort normal. He is intubated. No respiratory distress. He has decreased breath sounds in the right lower field and the left lower field.   Abdominal: Soft. He exhibits no distension.   Musculoskeletal: He exhibits edema.   Neurological:   sedated   Skin: Skin is warm.   Psychiatric:   JUAREZ       Quality Measures:     Reviewed items::  EKG reviewed, Labs reviewed, Medications reviewed and Radiology images reviewed  Jenkins catheter::  One or Two Days Post Surgery (Day of Surgery being Day 0) and Critically Ill - Requiring Accurate Measurement of Urinary Output  Central line in place:  Need for access and Vasopressors  DVT prophylaxis pharmacological::  Contraindicated - High bleeding risk  DVT prophylaxis - mechanical:  SCDs  Ulcer Prophylaxis::  Yes      Assessment/Plan:  POD 1 - HOTN- IABP 1:2, epi/jazz, vent- peep inc this am, keep CT/jenkins, CPM  POD 2 HOTN - epi/jazz- IABP 1:2, Vent - pulm following, s/p PEA arrest yesterday- bronch with lots of thick secretions, sedated, CPM  POD 3 - HOTN - epi/jazz- decreased from yesterday, IABP 1:2, Vent - CMV 26, PEEP 10, sedated, diurese well yesterday- start lasix gtt today, CPM  POD 4 NSR, Hotn-epi/jazz, down slightly form yesterday.  IABP 1:2 no change in pressures with pump on standby--change to 1:4.  On lasix gtt, diuresing well.  FiO2 down to 40%, PEEP 10.  Continue lasix gtt.  Will plan on removing IABP today. CPM.   POD 5 HDS, SB- d/c amio, epi/jazz- weaning, Vent- per pulm, sedated when awake follows per RN, lasix gtt- cont, CPM  POD 6 HDS, NSR, epi/jazz--weaning.  On lasix gtt.  WBCs up, bronch yesterday now on ABX.  Mod r effusion.  Will tap.  Keep SERGE drain L thigh one more day.Keep CTs while vented.  CPM.  POD 7 HDS, on low dose epi, off jazz.  NSR. RUE thrombus started on heparin gtt--held this AM for thoracentesis.  Vent 40%/peep 8.  Sedation weaning.  PLAN:  DC temp wires, resume anticoagulation after thoracentesis.  Vent per pulm. Keep CTs  today.  POD 8 Remains on epi, no change.  Back in afib yesterday, rebolused amio, now AT vs a flutter?  Cardiology to address. On lovenox.  CXR remains with edema/effusions, unable to perform thoracentesis yesterday. Keep lasix gtt today and k-scale.   Agitated, kept on sedation.  Vent per PMA.  May need to move toward trach next few days.  Plan to hold lovenox in AM to DC CTs and SERGE drain.  DC prevena, DC staples EVH site. CPM.  POD 9 Epi/jazz/lasix, ST, VDRF, sedated.  Lasix gtt.  PLAN: Vent per PMA,  Lovenox held for trach likely today.  DC SERGE drain.  CPM.   POD 10 Epi/jazz/lasix, s/p trach, sedated but more alert/cooperative this AM.  Some serosanguinous oozing SERGE exit site.  Echo today per cardiology. CPM.  POD 11 Still on epi and jazz drips.  Awake alert.  Follows commands.  Awaiting echo.  Once off drips to LTAC for rehab.  Moderate left pleural effusion.  Will order bedside tap.       Active Hospital Problems    Diagnosis   • CAD (coronary artery disease) [I25.10]   • CHF (congestive heart failure) (CMS-HCC) [I50.9]

## 2018-01-02 NOTE — PROGRESS NOTES
Pt back into NSR but having frequent ectopy - some bigeminy.  [facility]HS notified and amiodarone drip without bolus ordered.

## 2018-01-02 NOTE — DISCHARGE PLANNING
Medical SW    Referral: Sw met w/ pt at bedside. Sw explained LTAC referral and process.    Intervention: Pt would like Sw to speak w/ his wife regarding choice. Sw spoke to bedside RN and wife should be in today.    IDT rounds: trach day 3. Sw spoke to family and wife concerned wont be able to walk to any of the LTAC locations. Sw provided choice form, MD spoke to family.        Plan: Sw to assist w/ d/c planning as needed.

## 2018-01-02 NOTE — RESPIRATORY CARE
COPD EDUCATION by COPD CLINICAL EDUCATOR  1/2/2018 at 9:57 AM by Dalia Chadwick     Patient reviewed by COPD education team. Patient does not qualify for COPD program.

## 2018-01-03 ENCOUNTER — APPOINTMENT (OUTPATIENT)
Dept: RADIOLOGY | Facility: MEDICAL CENTER | Age: 53
DRG: 003 | End: 2018-01-03
Attending: CLINICAL NURSE SPECIALIST
Payer: MEDICARE

## 2018-01-03 ENCOUNTER — APPOINTMENT (OUTPATIENT)
Dept: RADIOLOGY | Facility: MEDICAL CENTER | Age: 53
DRG: 003 | End: 2018-01-03
Attending: INTERNAL MEDICINE
Payer: MEDICARE

## 2018-01-03 LAB
ANION GAP SERPL CALC-SCNC: 6 MMOL/L (ref 0–11.9)
APPEARANCE FLD: NORMAL
BASOPHILS NFR FLD: 1 %
BODY FLD TYPE: NORMAL
BUN SERPL-MCNC: 54 MG/DL (ref 8–22)
CALCIUM SERPL-MCNC: 8 MG/DL (ref 8.5–10.5)
CHLORIDE SERPL-SCNC: 103 MMOL/L (ref 96–112)
CO2 SERPL-SCNC: 33 MMOL/L (ref 20–33)
COLOR FLD: NORMAL
CREAT SERPL-MCNC: 1.34 MG/DL (ref 0.5–1.4)
CYTOLOGY REG CYTOL: NORMAL
EOSINOPHIL NFR FLD: 1 %
ERYTHROCYTE [DISTWIDTH] IN BLOOD BY AUTOMATED COUNT: 61.6 FL (ref 35.9–50)
GFR SERPL CREATININE-BSD FRML MDRD: 56 ML/MIN/1.73 M 2
GLUCOSE BLD-MCNC: 164 MG/DL (ref 65–99)
GLUCOSE BLD-MCNC: 195 MG/DL (ref 65–99)
GLUCOSE BLD-MCNC: 197 MG/DL (ref 65–99)
GLUCOSE BLD-MCNC: 203 MG/DL (ref 65–99)
GLUCOSE FLD-MCNC: 134 MG/DL
GLUCOSE SERPL-MCNC: 155 MG/DL (ref 65–99)
GRAM STN SPEC: NORMAL
HCT VFR BLD AUTO: 28.5 % (ref 42–52)
HGB BLD-MCNC: 8.1 G/DL (ref 14–18)
LDH FLD L TO P-CCNC: 1039 U/L
LDH SERPL L TO P-CCNC: 375 U/L (ref 107–266)
LYMPHOCYTES NFR FLD: 16 %
MCH RBC QN AUTO: 23.5 PG (ref 27–33)
MCHC RBC AUTO-ENTMCNC: 28.4 G/DL (ref 33.7–35.3)
MCV RBC AUTO: 82.6 FL (ref 81.4–97.8)
MONONUC CELLS NFR FLD: 5 %
NEUTROPHILS NFR FLD: 77 %
PH FLD: 7.5 [PH]
PLATELET # BLD AUTO: 659 K/UL (ref 164–446)
PMV BLD AUTO: 10.5 FL (ref 9–12.9)
POTASSIUM SERPL-SCNC: 3.6 MMOL/L (ref 3.6–5.5)
POTASSIUM SERPL-SCNC: 3.8 MMOL/L (ref 3.6–5.5)
POTASSIUM SERPL-SCNC: 4 MMOL/L (ref 3.6–5.5)
POTASSIUM SERPL-SCNC: 4.2 MMOL/L (ref 3.6–5.5)
PROT FLD-MCNC: 3.8 G/DL
PROT SERPL-MCNC: 6.5 G/DL (ref 6–8.2)
RBC # BLD AUTO: 3.45 M/UL (ref 4.7–6.1)
RBC # FLD: NORMAL CELLS/UL
SIGNIFICANT IND 70042: NORMAL
SITE SITE: NORMAL
SODIUM SERPL-SCNC: 142 MMOL/L (ref 135–145)
SOURCE SOURCE: NORMAL
WBC # BLD AUTO: 27.8 K/UL (ref 4.8–10.8)
WBC # FLD: 1406 CELLS/UL

## 2018-01-03 PROCEDURE — 700101 HCHG RX REV CODE 250: Performed by: INTERNAL MEDICINE

## 2018-01-03 PROCEDURE — 0W9B3ZZ DRAINAGE OF LEFT PLEURAL CAVITY, PERCUTANEOUS APPROACH: ICD-10-PCS | Performed by: INTERNAL MEDICINE

## 2018-01-03 PROCEDURE — 87102 FUNGUS ISOLATION CULTURE: CPT

## 2018-01-03 PROCEDURE — 700102 HCHG RX REV CODE 250 W/ 637 OVERRIDE(OP): Performed by: INTERNAL MEDICINE

## 2018-01-03 PROCEDURE — 700111 HCHG RX REV CODE 636 W/ 250 OVERRIDE (IP): Performed by: NURSE PRACTITIONER

## 2018-01-03 PROCEDURE — 700111 HCHG RX REV CODE 636 W/ 250 OVERRIDE (IP): Performed by: INTERNAL MEDICINE

## 2018-01-03 PROCEDURE — 87075 CULTR BACTERIA EXCEPT BLOOD: CPT

## 2018-01-03 PROCEDURE — 85027 COMPLETE CBC AUTOMATED: CPT

## 2018-01-03 PROCEDURE — 32555 ASPIRATE PLEURA W/ IMAGING: CPT | Mod: LT

## 2018-01-03 PROCEDURE — 80048 BASIC METABOLIC PNL TOTAL CA: CPT

## 2018-01-03 PROCEDURE — 83986 ASSAY PH BODY FLUID NOS: CPT

## 2018-01-03 PROCEDURE — 94003 VENT MGMT INPAT SUBQ DAY: CPT

## 2018-01-03 PROCEDURE — 88305 TISSUE EXAM BY PATHOLOGIST: CPT | Mod: 59

## 2018-01-03 PROCEDURE — 700102 HCHG RX REV CODE 250 W/ 637 OVERRIDE(OP): Performed by: CLINICAL NURSE SPECIALIST

## 2018-01-03 PROCEDURE — 87205 SMEAR GRAM STAIN: CPT

## 2018-01-03 PROCEDURE — 89051 BODY FLUID CELL COUNT: CPT

## 2018-01-03 PROCEDURE — 97162 PT EVAL MOD COMPLEX 30 MIN: CPT

## 2018-01-03 PROCEDURE — 87116 MYCOBACTERIA CULTURE: CPT | Mod: 91

## 2018-01-03 PROCEDURE — 71045 X-RAY EXAM CHEST 1 VIEW: CPT

## 2018-01-03 PROCEDURE — A9270 NON-COVERED ITEM OR SERVICE: HCPCS | Performed by: INTERNAL MEDICINE

## 2018-01-03 PROCEDURE — 700112 HCHG RX REV CODE 229: Performed by: CLINICAL NURSE SPECIALIST

## 2018-01-03 PROCEDURE — A9270 NON-COVERED ITEM OR SERVICE: HCPCS | Performed by: THORACIC SURGERY (CARDIOTHORACIC VASCULAR SURGERY)

## 2018-01-03 PROCEDURE — 84155 ASSAY OF PROTEIN SERUM: CPT

## 2018-01-03 PROCEDURE — 87077 CULTURE AEROBIC IDENTIFY: CPT | Mod: 91

## 2018-01-03 PROCEDURE — A9270 NON-COVERED ITEM OR SERVICE: HCPCS | Performed by: CLINICAL NURSE SPECIALIST

## 2018-01-03 PROCEDURE — 700111 HCHG RX REV CODE 636 W/ 250 OVERRIDE (IP): Performed by: THORACIC SURGERY (CARDIOTHORACIC VASCULAR SURGERY)

## 2018-01-03 PROCEDURE — 82962 GLUCOSE BLOOD TEST: CPT

## 2018-01-03 PROCEDURE — 87015 SPECIMEN INFECT AGNT CONCNTJ: CPT | Mod: 91

## 2018-01-03 PROCEDURE — 302978 HCHG BRONCHOSCOPY-DIAGNOSTIC

## 2018-01-03 PROCEDURE — G8979 MOBILITY GOAL STATUS: HCPCS | Mod: CJ

## 2018-01-03 PROCEDURE — 83615 LACTATE (LD) (LDH) ENZYME: CPT

## 2018-01-03 PROCEDURE — 700105 HCHG RX REV CODE 258: Performed by: THORACIC SURGERY (CARDIOTHORACIC VASCULAR SURGERY)

## 2018-01-03 PROCEDURE — 700111 HCHG RX REV CODE 636 W/ 250 OVERRIDE (IP): Performed by: CLINICAL NURSE SPECIALIST

## 2018-01-03 PROCEDURE — 84132 ASSAY OF SERUM POTASSIUM: CPT | Mod: 91

## 2018-01-03 PROCEDURE — 87070 CULTURE OTHR SPECIMN AEROBIC: CPT

## 2018-01-03 PROCEDURE — 700105 HCHG RX REV CODE 258: Performed by: CLINICAL NURSE SPECIALIST

## 2018-01-03 PROCEDURE — 770022 HCHG ROOM/CARE - ICU (200)

## 2018-01-03 PROCEDURE — 700102 HCHG RX REV CODE 250 W/ 637 OVERRIDE(OP): Performed by: THORACIC SURGERY (CARDIOTHORACIC VASCULAR SURGERY)

## 2018-01-03 PROCEDURE — 88112 CYTOPATH CELL ENHANCE TECH: CPT

## 2018-01-03 PROCEDURE — 76604 US EXAM CHEST: CPT

## 2018-01-03 PROCEDURE — 84157 ASSAY OF PROTEIN OTHER: CPT

## 2018-01-03 PROCEDURE — 0BCB8ZZ EXTIRPATION OF MATTER FROM LEFT LOWER LOBE BRONCHUS, VIA NATURAL OR ARTIFICIAL OPENING ENDOSCOPIC: ICD-10-PCS | Performed by: INTERNAL MEDICINE

## 2018-01-03 PROCEDURE — 0B9J8ZX DRAINAGE OF LEFT LOWER LUNG LOBE, VIA NATURAL OR ARTIFICIAL OPENING ENDOSCOPIC, DIAGNOSTIC: ICD-10-PCS | Performed by: INTERNAL MEDICINE

## 2018-01-03 PROCEDURE — G8978 MOBILITY CURRENT STATUS: HCPCS | Mod: CM

## 2018-01-03 PROCEDURE — 87206 SMEAR FLUORESCENT/ACID STAI: CPT | Mod: 91

## 2018-01-03 PROCEDURE — 700105 HCHG RX REV CODE 258: Performed by: NURSE PRACTITIONER

## 2018-01-03 PROCEDURE — 0B9F8ZX DRAINAGE OF RIGHT LOWER LUNG LOBE, VIA NATURAL OR ARTIFICIAL OPENING ENDOSCOPIC, DIAGNOSTIC: ICD-10-PCS | Performed by: INTERNAL MEDICINE

## 2018-01-03 PROCEDURE — 82945 GLUCOSE OTHER FLUID: CPT

## 2018-01-03 PROCEDURE — 87186 SC STD MICRODIL/AGAR DIL: CPT

## 2018-01-03 RX ORDER — POTASSIUM CHLORIDE 7.45 MG/ML
10 INJECTION INTRAVENOUS ONCE
Status: COMPLETED | OUTPATIENT
Start: 2018-01-03 | End: 2018-01-03

## 2018-01-03 RX ORDER — MIDAZOLAM HYDROCHLORIDE 1 MG/ML
1-5 INJECTION INTRAMUSCULAR; INTRAVENOUS ONCE
Status: COMPLETED | OUTPATIENT
Start: 2018-01-03 | End: 2018-01-03

## 2018-01-03 RX ADMIN — INSULIN HUMAN 5 UNITS: 100 INJECTION, SUSPENSION SUBCUTANEOUS at 06:22

## 2018-01-03 RX ADMIN — POTASSIUM CHLORIDE 10 MEQ: 7.46 INJECTION, SOLUTION INTRAVENOUS at 13:45

## 2018-01-03 RX ADMIN — DEXMEDETOMIDINE HYDROCHLORIDE 0.7 MCG/KG/HR: 4 INJECTION, SOLUTION INTRAVENOUS at 08:35

## 2018-01-03 RX ADMIN — DEXMEDETOMIDINE HYDROCHLORIDE 0.5 MCG/KG/HR: 4 INJECTION, SOLUTION INTRAVENOUS at 12:15

## 2018-01-03 RX ADMIN — ASPIRIN 81 MG: 81 TABLET, CHEWABLE ORAL at 08:52

## 2018-01-03 RX ADMIN — DEXMEDETOMIDINE HYDROCHLORIDE 0.7 MCG/KG/HR: 4 INJECTION, SOLUTION INTRAVENOUS at 02:45

## 2018-01-03 RX ADMIN — CEFAZOLIN SODIUM 2 G: 2 INJECTION, SOLUTION INTRAVENOUS at 21:52

## 2018-01-03 RX ADMIN — CEFAZOLIN SODIUM 2 G: 2 INJECTION, SOLUTION INTRAVENOUS at 14:46

## 2018-01-03 RX ADMIN — MORPHINE SULFATE 4 MG: 4 INJECTION INTRAVENOUS at 13:28

## 2018-01-03 RX ADMIN — AMIODARONE HYDROCHLORIDE 0.5 MG/MIN: 50 INJECTION, SOLUTION INTRAVENOUS at 06:14

## 2018-01-03 RX ADMIN — POTASSIUM CHLORIDE 10 MEQ: 7.46 INJECTION, SOLUTION INTRAVENOUS at 08:55

## 2018-01-03 RX ADMIN — POTASSIUM CHLORIDE 10 MEQ: 7.46 INJECTION, SOLUTION INTRAVENOUS at 02:27

## 2018-01-03 RX ADMIN — CEFAZOLIN SODIUM 2 G: 2 INJECTION, SOLUTION INTRAVENOUS at 06:00

## 2018-01-03 RX ADMIN — FAMOTIDINE 20 MG: 20 TABLET, FILM COATED ORAL at 08:52

## 2018-01-03 RX ADMIN — DOCUSATE SODIUM 100 MG: 50 LIQUID ORAL at 11:32

## 2018-01-03 RX ADMIN — FUROSEMIDE 80 MG: 10 INJECTION, SOLUTION INTRAMUSCULAR; INTRAVENOUS at 15:17

## 2018-01-03 RX ADMIN — AMIODARONE HYDROCHLORIDE 400 MG: 200 TABLET ORAL at 21:28

## 2018-01-03 RX ADMIN — BISACODYL 10 MG: 10 SUPPOSITORY RECTAL at 09:12

## 2018-01-03 RX ADMIN — FENTANYL CITRATE 100 MCG: 50 INJECTION, SOLUTION INTRAMUSCULAR; INTRAVENOUS at 17:22

## 2018-01-03 RX ADMIN — EPINEPHRINE 0.04 MCG/KG/MIN: 1 INJECTION, SOLUTION INTRAMUSCULAR; SUBCUTANEOUS at 12:28

## 2018-01-03 RX ADMIN — INSULIN HUMAN 5 UNITS: 100 INJECTION, SUSPENSION SUBCUTANEOUS at 21:53

## 2018-01-03 RX ADMIN — CHLORHEXIDINE GLUCONATE 15 ML: 1.2 RINSE ORAL at 21:28

## 2018-01-03 RX ADMIN — POTASSIUM BICARBONATE 50 MEQ: 25 TABLET, EFFERVESCENT ORAL at 06:00

## 2018-01-03 RX ADMIN — CHLORHEXIDINE GLUCONATE 15 ML: 1.2 RINSE ORAL at 08:53

## 2018-01-03 RX ADMIN — NYSTATIN 500000 UNITS: 100000 SUSPENSION ORAL at 21:28

## 2018-01-03 RX ADMIN — STANDARDIZED SENNA CONCENTRATE AND DOCUSATE SODIUM 1 TABLET: 8.6; 5 TABLET, FILM COATED ORAL at 21:28

## 2018-01-03 RX ADMIN — MIDAZOLAM 5 MG: 1 INJECTION INTRAMUSCULAR; INTRAVENOUS at 17:22

## 2018-01-03 RX ADMIN — SODIUM CHLORIDE: 9 INJECTION, SOLUTION INTRAVENOUS at 23:07

## 2018-01-03 RX ADMIN — INSULIN HUMAN 5 UNITS: 100 INJECTION, SUSPENSION SUBCUTANEOUS at 14:46

## 2018-01-03 RX ADMIN — DEXMEDETOMIDINE HYDROCHLORIDE 0.5 MCG/KG/HR: 4 INJECTION, SOLUTION INTRAVENOUS at 18:46

## 2018-01-03 RX ADMIN — DIGOXIN 250 MCG: 125 TABLET ORAL at 19:12

## 2018-01-03 RX ADMIN — POTASSIUM BICARBONATE 50 MEQ: 25 TABLET, EFFERVESCENT ORAL at 19:14

## 2018-01-03 RX ADMIN — LACTULOSE 30 ML: 20 SOLUTION ORAL at 09:12

## 2018-01-03 RX ADMIN — ROSUVASTATIN CALCIUM 40 MG: 10 TABLET, FILM COATED ORAL at 21:28

## 2018-01-03 RX ADMIN — MORPHINE SULFATE 4 MG: 4 INJECTION INTRAVENOUS at 17:43

## 2018-01-03 RX ADMIN — AMIODARONE HYDROCHLORIDE 400 MG: 200 TABLET ORAL at 08:53

## 2018-01-03 RX ADMIN — FUROSEMIDE 80 MG: 10 INJECTION, SOLUTION INTRAMUSCULAR; INTRAVENOUS at 06:07

## 2018-01-03 RX ADMIN — OXYCODONE HYDROCHLORIDE 10 MG: 10 TABLET ORAL at 09:12

## 2018-01-03 RX ADMIN — FAMOTIDINE 20 MG: 20 TABLET, FILM COATED ORAL at 21:28

## 2018-01-03 RX ADMIN — POTASSIUM CHLORIDE 20 MEQ: 2 INJECTION, SOLUTION, CONCENTRATE INTRAVENOUS at 19:07

## 2018-01-03 RX ADMIN — DEXMEDETOMIDINE HYDROCHLORIDE 0.9 MCG/KG/HR: 4 INJECTION, SOLUTION INTRAVENOUS at 23:20

## 2018-01-03 ASSESSMENT — COGNITIVE AND FUNCTIONAL STATUS - GENERAL
MOBILITY SCORE: 6
WALKING IN HOSPITAL ROOM: TOTAL
MOVING TO AND FROM BED TO CHAIR: UNABLE
SUGGESTED CMS G CODE MODIFIER MOBILITY: CN
TURNING FROM BACK TO SIDE WHILE IN FLAT BAD: UNABLE
MOVING FROM LYING ON BACK TO SITTING ON SIDE OF FLAT BED: UNABLE
CLIMB 3 TO 5 STEPS WITH RAILING: TOTAL
STANDING UP FROM CHAIR USING ARMS: TOTAL

## 2018-01-03 ASSESSMENT — PAIN SCALES - GENERAL
PAINLEVEL_OUTOF10: 2
PAINLEVEL_OUTOF10: 0

## 2018-01-03 ASSESSMENT — GAIT ASSESSMENTS: GAIT LEVEL OF ASSIST: UNABLE TO PARTICIPATE

## 2018-01-03 NOTE — CARE PLAN
Problem: Post Op Day 4 CABG/Heart Valve Replacement  Goal: Optimal care of the Post Op CABG/Heart Valve replacement Post Op Day 4    Intervention: Daily Weights  Completed        Problem: Mechanical Ventilation  Goal: Safe management of artificial airway and ventilation  Oral care, suctioning frequently throughout shift, copious, white, thick secretions, HOB at 30 degrees, ambu bag at bedside, ventilator plugged into red outlet.  Trach care completed.

## 2018-01-03 NOTE — CARE PLAN
Problem: Post Op Day 4 CABG/Heart Valve Replacement  Goal: Optimal care of the Post Op CABG/Heart Valve replacement Post Op Day 4    Intervention: Daily Weights  Done on NOC shift  Intervention: Shower daily and clean incisions twice daily with soap and water  Done  Intervention: Up in chair for all meals  Pt trached on vent and tube feeds  Intervention: Ambulate, increasing the distance each time x 3 and before bed  Unable to accomplish this - pt very deconditioned.  Intervention: IS q 1 hour while awake and record best IS volume  Unable to perform  Intervention: Consider removal of jenkins, chest tube and pacer wire if not already done  CT removed

## 2018-01-03 NOTE — PROGRESS NOTES
Monitor Summary and 12hour chart check:    Pt in Afib/Aflutter until converting to SR at 1538 with frequent ectopy.  Restarted on amiodarone drip without bolus.    .18/.12/.36

## 2018-01-03 NOTE — PROGRESS NOTES
Cardiovascular Surgery Progress Note    Name: Joshua Medrano  MRN: [id number]  : 1965  Admit Date: 2017 10:21 AM  Procedure:  Procedure(s) and Anesthesia Type:     * MULTIPLE CORONARY ARTERY BYPASS ENDO VEIN HARVEST X2 - General     * JIM - General  12 Day Post-Op    Vitals:  Patient Vitals for the past 8 hrs:   SpO2 O2 Delivery Pulse Heart Rate (Monitored) Resp NIBP   18 1100 97 % - (!) 107 (!) 102 (!) 21 112/69   18 1013 95 % - - 81 - -   18 1000 94 % Ventilator 99 89 (!) 26 (!) 86/63   18 0952 100 % - - (!) 101 - -   18 0910 95 % - 96 93 (!) 26 (!) 89/54   18 0900 94 % - 100 96 (!) 23 (!) 84/53   18 0801 97 % - - (!) 128 - -   18 0800 97 % Ventilator (!) 126 (!) 128 (!) 32 121/74   18 0700 97 % - 80 80 20 -   18 0645 96 % - 76 76 (!) 31 116/58   18 0641 95 % - - 76 - -   18 0630 93 % - 77 76 (!) 27 110/53   18 0615 93 % - 77 74 (!) 21 -   18 0600 95 % - 77 77 (!) 23 112/54   18 0545 95 % - 75 78 (!) 25 124/63   18 0530 99 % - 77 76 13 111/64   18 0515 96 % - 76 79 17 -   18 0500 99 % - 78 78 20 126/66   18 0445 97 % - 71 71 19 104/54   18 0430 100 % - 66 67 19 102/53     Temp (24hrs), Av.4 °C (101.2 °F), Min:38.1 °C (100.6 °F), Max:38.8 °C (101.8 °F)      Respiratory:  Leo Vent Mode: APVCMV, Rate (breaths/min): 26, PEEP/CPAP: 10, FiO2: 50, P Peak (PIP): 28, P MEAN: 16 Respiration: (!) 21, Pulse Oximetry: 97 %     Chest Tube Drains:          Fluids:    Intake/Output Summary (Last 24 hours) at 18 1226  Last data filed at 18 1000   Gross per 24 hour   Intake          3220.69 ml   Output             3850 ml   Net          -629.31 ml     Admit weight: Weight: (!) 159.2 kg (350 lb 15.6 oz)  Current weight: Weight: (!) 150 kg (330 lb 11 oz) (18 0400)    Labs:  Recent Labs      18   0537  18   0510  18   0615   WBC  26.9*  28.7*  27.8*   RBC   3.49*  3.72*  3.45*   HEMOGLOBIN  8.3*  8.8*  8.1*   HEMATOCRIT  28.4*  30.2*  28.5*   MCV  81.4  81.2*  82.6   MCH  23.8*  23.7*  23.5*   MCHC  29.2*  29.1*  28.4*   RDW  60.6*  60.6*  61.6*   PLATELETCT  620*  697*  659*   MPV  10.3  10.2  10.5         Recent Labs      01/01/18   0537   01/02/18   0510   01/02/18   2349  01/03/18   0615  01/03/18   1140   SODIUM  141   --   139   --    --   142   --    POTASSIUM  3.5*   < >  3.8   < >  3.8  4.0  4.2   CHLORIDE  102   --   101   --    --   103   --    CO2  33   --   31   --    --   33   --    GLUCOSE  154*   --   175*   --    --   155*   --    BUN  47*   --   49*   --    --   54*   --    CREATININE  1.26   --   1.18   --    --   1.34   --    CALCIUM  8.3*   --   8.1*   --    --   8.0*   --     < > = values in this interval not displayed.           Medications:  • furosemide  80 mg     • rosuvastatin  40 mg     • digoxin  250 mcg     • amiodarone  400 mg     • docusate sodium 100mg/10mL  100 mg      Or   • docusate sodium  100 mg     • ceFAZolin  2 g     • potassium bicarbonate  50 mEq     • aspirin  81 mg     • K+ Scale: Goal of 4.5  1 Each     • insulin NPH  5 Units     • famotidine  20 mg     • chlorhexidine  15 mL     • insulin lispro  0-15 Units     • senna-docusate  1 Tab         Exam:   Review of Systems   Unable to perform ROS: Intubated       Physical Exam   Constitutional: No distress. He is intubated.   Morbidly obese   Neck: Neck supple.   Cardiovascular: Normal rate and regular rhythm.  Exam reveals no gallop.    No murmur heard.  Pulmonary/Chest: Effort normal. He is intubated. No respiratory distress. He has decreased breath sounds in the right lower field and the left lower field.   Abdominal: Soft. He exhibits no distension.   Musculoskeletal: He exhibits edema.   Neurological:   sedated   Skin: Skin is warm.   Psychiatric:   JUAREZ       Quality Measures:     Reviewed items::  EKG reviewed, Labs reviewed, Medications reviewed and Radiology images  reviewed  Jenkins catheter::  One or Two Days Post Surgery (Day of Surgery being Day 0) and Critically Ill - Requiring Accurate Measurement of Urinary Output  Central line in place:  Need for access and Vasopressors  DVT prophylaxis pharmacological::  Contraindicated - High bleeding risk  DVT prophylaxis - mechanical:  SCDs  Ulcer Prophylaxis::  Yes      Assessment/Plan:  POD 1 - HOTN- IABP 1:2, epi/jazz, vent- peep inc this am, keep CT/jenkins, CPM  POD 2 HOTN - epi/jazz- IABP 1:2, Vent - pulm following, s/p PEA arrest yesterday- bronch with lots of thick secretions, sedated, CPM  POD 3 - HOTN - epi/jazz- decreased from yesterday, IABP 1:2, Vent - CMV 26, PEEP 10, sedated, diurese well yesterday- start lasix gtt today, CPM  POD 4 NSR, Hotn-epi/jazz, down slightly form yesterday.  IABP 1:2 no change in pressures with pump on standby--change to 1:4.  On lasix gtt, diuresing well.  FiO2 down to 40%, PEEP 10.  Continue lasix gtt.  Will plan on removing IABP today. CPM.   POD 5 HDS, SB- d/c amio, epi/jazz- weaning, Vent- per pulm, sedated when awake follows per RN, lasix gtt- cont, CPM  POD 6 HDS, NSR, epi/jazz--weaning.  On lasix gtt.  WBCs up, bronch yesterday now on ABX.  Mod r effusion.  Will tap.  Keep SERGE drain L thigh one more day.Keep CTs while vented.  CPM.  POD 7 HDS, on low dose epi, off jazz.  NSR. RUE thrombus started on heparin gtt--held this AM for thoracentesis.  Vent 40%/peep 8.  Sedation weaning.  PLAN:  DC temp wires, resume anticoagulation after thoracentesis.  Vent per pulm. Keep CTs today.  POD 8 Remains on epi, no change.  Back in afib yesterday, rebolused amio, now AT vs a flutter?  Cardiology to address. On lovenox.  CXR remains with edema/effusions, unable to perform thoracentesis yesterday. Keep lasix gtt today and k-scale.   Agitated, kept on sedation.  Vent per PMA.  May need to move toward trach next few days.  Plan to hold lovenox in AM to ROBERTA CTs and SERGE drain.  ROBERTA lockett, DC staples EVH site.  CPM.  POD 9 Epi/jazz/lasix, ST, VDRF, sedated.  Lasix gtt.  PLAN: Vent per PMA,  Lovenox held for trach likely today.  DC SERGE drain.  CPM.   POD 10 Epi/jazz/lasix, s/p trach, sedated but more alert/cooperative this AM.  Some serosanguinous oozing SERGE exit site.  Echo today per cardiology. CPM.  POD 11 Still on epi and jazz drips.  Awake alert.  Follows commands.  Awaiting echo.  Once off drips to LTAC for rehab.  Moderate left pleural effusion.  Will order bedside tap.   POD 12 Paroxysmal afib, anticoagulated on lovenox, cards following.  Off jazz, low dose epi only.  More alert today, followsCRYSTAL. Thoracentesis planned for today.  CPM.  Push PT/OT.      Active Hospital Problems    Diagnosis   • CAD (coronary artery disease) [I25.10]   • CHF (congestive heart failure) (CMS-HCC) [I50.9]

## 2018-01-03 NOTE — DISCHARGE PLANNING
Medical SW    Referral: Sw received call from Ioana w/ LT[facility].    Intervention: Sw advised liaison, Ioana, pt is not medically cleared for d/c today.     Plan: Sw to assist w/ d/c planning as needed.

## 2018-01-03 NOTE — PROGRESS NOTES
Pulmonary Critical Care Progress Note      DOS:  1/3/18, admit 12/19/2017    Chief Complaint: CAD, 2V CABG    History of Present Illness: 51 y/o M, h/o DM, COPD, DLD, CAD, SHASHANK, Obesity; underwent 2v CABG 12/22    Interval Events:  24 hour interval history reviewed   Reason for visit:  A&C respiratory failure, CAD s/p CABG, AF/flut, Low K/Mg, S/p trach 12/31    Mildly confused - follows  Amio  Back in AF 120s  KRISTIN off  EPI 0.03  DEX 0.7  FENT 50 -> off now  EOB  L thora pending  Lovensox on hold  PEEP 8 and fiO2 increased to 0.5  Lots secretions  Ancef  8/10    Serial follow-up evaluations performed  Thoracentesis attempted by radiology, unsuccessful  Interventional radiologist requested to perform thoracentesis and successfully performed late afternoon with over 1000 cc removed, studies pending    Low-grade fevers throughout the day, worsening oxygenation requiring PEEP to be increased back from 8-10 words been the last several days. Perhaps increased secretions from tracheostomy tube.  Reviewed with family at bedside, diagnostic and therapeutic bronchoscopy to be performed    Hemodynamics remain okay, blood pressure a little soft/slightly low at times, may be related to blood pressure cuff       YESTERDAY  Sedate on vent - follows some  Trach ok  SBT 1 hr RSBI 59  SBT 1 hr x2 yesterday  CXR better  - less eff/atx on R - L effusion worse?  DEX 0.8  Fent 100  Afib/flutter - rate 70-80s  CVP 7-12  Lasix drip -> Lasix 80 BID IV  Amio to PO  KRISTIN 10  EPI 0.05  Tm 38.3  TF goal   UO adequate - I/Os neg 856  Full dose lovenox - last dose this AM  Ancef 7/10    D/w family at bedside       YESTERDAY  Sedate on vent  Follows some - less agitated  AF/flut - HR 80s  Bp ok on pressors  Tm 37.9  Trach not bleeding  KRISTIN 50  EPI 0.03  Amio 0.5  Fent 100  Prop 20  Lasix 10/hr drip  Full dose Lovenox  Ancef 6/10  K 3.5  Check Mg  CXR wet/BSSLLA - no change vs old film  Therapies    YESTERDAY   - AF/flutter, , amio .5   - epi,  jazz gtts   - lasix gtt   - I/O 4.7/6.5   - vent day 9, min secretions   - cxr unchagned   - CTs out     - Lovenox 150 bid, held for trach    - day 5 of 10 Ancef for MSSA, kleb BAL   - Cr up 1.28   - perc trach today  Yesterday   - POD8   - arouses, follows, restless, prop 60   - AF/flutter, 120's   - amiodarone, gtt .5   - epi gtt, jazz gtt   - lasix gtt 10/hr   - williams TF at 80   - good UOP, I/O 5.5/7.8   - Tm 38.3   - full dose lovenox   - RUE DVT   - vent day 9, 8, 40%; no SBT, cxr edema, atx unchanged   - ancef day 2 for kleb/mssa pna   - will need trach  :  Review of Systems   Unable to perform ROS: Acuity of condition       Physical Exam   Constitutional: He appears well-developed and well-nourished.   Remains confused but follows   HENT:   Head: Normocephalic and atraumatic.   Eyes: Pupils are equal, round, and reactive to light. No scleral icterus.   Neck: No JVD present. No tracheal deviation present.   Cardiovascular: Normal rate and intact distal pulses.  Exam reveals no gallop and no friction rub.    No murmur heard.  Pulmonary/Chest: No respiratory distress. He has rales.   Diminished at bases L>R, scattered coarse crackles, no wheezes - better on R, trach site okay, mildly. Secretions from trach when aspirating airway   Abdominal: Soft. He exhibits no distension. There is no tenderness. There is no rebound and no guarding.   Musculoskeletal: He exhibits edema. He exhibits no deformity.   Trace edema   Neurological: No cranial nerve deficit.   sedate, now following some commands intermittently, moves all extremities   Skin: Skin is warm and dry.   Psychiatric:   Sedate, mood better   Nursing note and vitals reviewed.        PFSH:  No change.    Respiratory:  Loe Vent Mode: APVCMV, Rate (breaths/min): 26, Vt Target (mL): 540, PEEP/CPAP: 8, FiO2: 50, Static Compliance (ml / cm H2O): 56, Control VTE (exp VT): 551  Pulse Oximetry: 96 %    Ventilator mechanics and waveforms are reviewed at the bedside with  RT again    Chest Tube Drains:            Recent Labs      01/01/18   0417  01/02/18   0451   ISTATAPH  7.565*  7.549*   ISTATAPCO2  36.9  36.7   ISTATAPO2  57*  57*   ISTATATCO2  35*  33   PNCGQHH3LMM  93  93   ISTATARTHCO3  33.4*  32.0*   ISTATARTBE  10*  9*   ISTATTEMP  38.0 C  37.7 C   ISTATFIO2  40  40   ISTATSPEC  Arterial  Arterial   ISTATAPHTC  7.549*  7.538*   PRBUASOD4TR  61*  60*       HemoDynamics:  Pulse: 64, Heart Rate (Monitored): 68  NIBP: (!) 95/43  CVP (mm Hg): (!) 267 MM HG            Neuro:  GCS Total Enrique Coma Score: 11         Fluids:  Intake/Output       01/01/18 0700 - 01/02/18 0659 01/02/18 0700 - 01/03/18 0659 01/03/18 0700 - 01/04/18 0659      0700-1859 3482-9600 Total 0700-1859 1900-0659 Total 0700-1859 4210-8081 Total       Intake    I.V.  750.8  883.2 1633.9  100  -- 100  --  -- --    Precedex Volume 258.3 443.6 701.8 -- -- -- -- -- --    Phenylephrine Volume 177.3 98.2 275.4 -- -- -- -- -- --    Propofol Volume 133.9 -- 133.9 -- -- -- -- -- --    Epinephrine Volume 181.4 317.4 498.7 -- -- -- -- -- --    IV Piggyback Volume -- -- -- 100 -- 100 -- -- --    IV Volume (Fentanyl ) -- 24 24 -- -- -- -- -- --    Other  --  210 210  --  -- --  --  -- --    Medications (P.O./ Enteral Liquids) -- 210 210 -- -- -- -- -- --    Enteral  800  1380 2180  --  -- --  --  -- --    Enteral Volume 800 1020 1820 -- -- -- -- -- --    Free Water / Tube Flush -- 360 360 -- -- -- -- -- --    Total Intake 1550.8 2473.2 4023.9 100 -- 100 -- -- --       Output    Urine  2330  2550 4880  3200  1100 4300  --  -- --    Indwelling Cathether 2330 2550 4880 3200 1100 4300 -- -- --    Total Output 2330 2550 4880 3200 1100 4300 -- -- --       Net I/O     -779.2 -76.9 -856.1 -3100 -1100 -4200 -- -- --        Weight: (!) 148.9 kg (328 lb 4.2 oz)  Recent Labs      01/01/18   0537  01/01/18   1215   01/02/18   0510  01/02/18   1141  01/02/18   1710  01/02/18   2349   SODIUM  141   --    --   139   --    --    --     POTASSIUM  3.5*  3.7   < >  3.8  3.9  4.1  3.8   CHLORIDE  102   --    --   101   --    --    --    CO2  33   --    --   31   --    --    --    BUN  47*   --    --   49*   --    --    --    CREATININE  1.26   --    --   1.18   --    --    --    MAGNESIUM   --   2.2   --    --    --    --    --    CALCIUM  8.3*   --    --   8.1*   --    --    --     < > = values in this interval not displayed.       GI/Nutrition:  TF goal  Liver Function  Recent Labs      17   0510   PREALBUMIN  9.0*   --    --    GLUCOSE   --   154*  175*       Heme:  Recent Labs      18   0537  18   0510   RBC  3.49*  3.72*   HEMOGLOBIN  8.3*  8.8*   HEMATOCRIT  28.4*  30.2*   PLATELETCT  620*  697*       Infectious Disease:  Temp  Av.2 °C (100.8 °F)  Min: 37.7 °C (99.9 °F)  Max: 38.8 °C (101.8 °F)  Micro: reviewed  Recent Labs      1837  18   0510   WBC  26.9*  28.7*     Current Facility-Administered Medications   Medication Dose Frequency Provider Last Rate Last Dose   • furosemide (LASIX) injection 80 mg  80 mg BID DIURETIC Jerry Reyes M.D.   80 mg at 18 1552   • amiodarone (CORDARONE) 450 mg in D5W 250 mL Infusion  0.5-1 mg/min Continuous Silvia Preston AAlexPAlexNAlex 17 mL/hr at 18 2355 0.5 mg/min at 18 2355   • rosuvastatin (CRESTOR) tablet 40 mg  40 mg Q EVENING Jerry Reyes M.D.   40 mg at 18 2105   • digoxin (LANOXIN) tablet 250 mcg  250 mcg DAILY AT 1800 Jerry Reyes M.D.   250 mcg at 18 1644   • dexmedetomidine (PRECEDEX) 400 mcg/100mL premix infusion  0.1-1.5 mcg/kg/hr Continuous Jerry Reyes M.D. 34 mL/hr at 18 2340 0.9 mcg/kg/hr at 18 2340   • amiodarone (CORDARONE) tablet 400 mg  400 mg TWICE DAILY Gui Delcid M.D.   400 mg at 18 210   • docusate sodium 100mg/10mL (COLACE) solution 100 mg  100 mg DAILY Silvia Preston A.PAlexNAlex   100 mg at 18 0754    Or   • docusate sodium (COLACE)  capsule 100 mg  100 mg DAILY Silvia Preston A.P.N.   100 mg at 01/02/18 0831   • ceFAZolin (ANCEF) IVPB 2 g  2 g Q8HRS Amrik Banegas M.D.   2 g at 01/02/18 2104   • potassium bicarbonate (KLYTE) 25 MEQ effervescent tablet TBEF 50 mEq  50 mEq Q6HRS Amrik Banegas M.D.   50 mEq at 01/02/18 2341   • aspirin (ASA) chewable tab 81 mg  81 mg DAILY Kiel Ash M.D.   81 mg at 01/02/18 0831   • tetrahydrozoline (VISINE) 0.05 % ophthalmic solution 1 Drop  1 Drop 4X/DAY PRN Amrik Banegas M.D.       • K+ Scale: Goal of 4.5  1 Each Q6HRS SUSI Alfred.P.N.   1 Each at 01/03/18 0204   • insulin NPH (HUMULIN,NOVOLIN) injection 5 Units  5 Units Q8HRS Silvia Preston A.P.N.   5 Units at 01/02/18 2251   • phenylephrine (KRISTIN-SYNEPHRINE) 80,000 mcg in  mL Infusion  0-50 mcg/min Continuous SUSI Alfred.P.N.   Stopped at 01/02/18 2200   • famotidine (PEPCID) tablet 20 mg  20 mg BID Rodolfo Landa M.D.   20 mg at 01/02/18 2105   • chlorhexidine (PERIDEX) 0.12 % solution 15 mL  15 mL BID Rodolfo Landa M.D.   15 mL at 01/02/18 2105   • fentaNYL (SUBLIMAZE) 50 mcg/mL in 50mL (Continuous Infusion)   Continuous Rodolfo Landa M.D. 1 mL/hr at 01/02/18 1900 50 mcg/hr at 01/02/18 1900   • insulin lispro (HUMALOG) injection 0-15 Units  0-15 Units Q6HRS SUSI Alfred.P.N.   6 Units at 01/02/18 2348   • Respiratory Care per Protocol   Continuous RT SUSI Mendoza.P.N.       • NS infusion   Continuous SUSI Mendoza.P.N. 10 mL/hr at 01/02/18 1900     • Pharmacy Consult Request ...Pain Management Review 1 Each  1 Each PRN Silvia Preston A.P.N.       • senna-docusate (PERICOLACE or SENOKOT S) 8.6-50 MG per tablet 1 Tab  1 Tab Nightly Silvia Preston A.P.N.   1 Tab at 01/02/18 2106    And   • senna-docusate (PERICOLACE or SENOKOT S) 8.6-50 MG per tablet 1 Tab  1 Tab Q24HRS PRN Silvia Preston A.P.N.   1 Tab at 12/28/17 0732    And   • lactulose 20 GM/30ML solution 30 mL  30 mL Q24HRS PRN Silvia Preston,  A.P.N.   30 mL at 01/02/18 0831    And   • bisacodyl (DULCOLAX) suppository 10 mg  10 mg Q24HRS PRN Silvia  Mohan, A.P.N.   10 mg at 01/02/18 0831    And   • fleet enema 133 mL  1 Each Once PRN Silvia Preston A.P.N.       • electrolyte-A (PLASMALYTE-A) infusion   PRN Silvia  Mohan A.P.N.       • oxycodone immediate release (ROXICODONE) tablet 5 mg  5 mg Q3HRS PRN Silvia  Mohan, A.P.N.       • oxycodone immediate release (ROXICODONE) tablet 10 mg  10 mg Q3HRS PRN Silvia  Mohan, A.P.N.   10 mg at 12/29/17 1804   • morphine (pf) 4 mg/ml injection 4 mg  4 mg Q3HRS PRN Silvia  Mohan, A.P.N.       • tramadol (ULTRAM) 50 MG tablet 50 mg  50 mg Q4HRS PRN Silvia  Mohan, A.P.N.       • sodium bicarbonate 8.4 % injection 50 mEq  50 mEq Q HOUR PRN Silvia  Mohan, A.P.N.       • morphine (pf) 4 mg/ml injection 4 mg  4 mg Q HOUR PRN Silvia  Mohan, A.P.N.   4 mg at 12/23/17 0239   • ondansetron (ZOFRAN) syringe/vial injection 4 mg  4 mg Q6HRS PRN Silvia Preston, A.P.N.        Or   • prochlorperazine (COMPAZINE) injection 10 mg  10 mg Q6HRS PRN Silvia  Mohan, A.P.N.        Or   • promethazine (PHENERGAN) suppository 25 mg  25 mg Q6HRS PRN Silvia Preston, A.P.N.       • acetaminophen (TYLENOL) tablet 650 mg  650 mg Q4HRS PRN Silvia Preston, A.P.N.   650 mg at 01/01/18 1840    Or   • acetaminophen (TYLENOL) suppository 650 mg  650 mg Q4HRS PRN Silvia Preston, A.P.N.       • mag hydrox-al hydrox-simeth (MAALOX PLUS ES or MYLANTA DS) suspension 30 mL  30 mL Q4HRS PRN Silvia Preston A.P.N.   30 mL at 01/01/18 0753   • diphenhydrAMINE (BENADRYL) tablet/capsule 25 mg  25 mg HS PRN - MR X 1 Silvia Preston A.P.N.   25 mg at 01/02/18 0013   • epinephrine 1 mg/mL(1:1000) 8 mg in  mL Infusion  0-0.2 mcg/kg/min Continuous Kiel Ash M.D. 23 mL/hr at 01/03/18 0005 0.04 mcg/kg/min at 01/03/18 0005   • albuterol inhaler 2 Puff  2 Puff Q4H PRN (RT) Kiel Ash M.D.       • ipratropium-albuterol (DUONEB) nebulizer  solution 3 mL  3 mL Q4H PRN (RT) Kiel Ash M.D.   3 mL at 12/23/17 1154     Last reviewed on 12/22/2017  7:07 AM by Katie Aly R.N.    Quality  Measures:  Labs reviewed, Medications reviewed and Radiology images reviewed                      Assessment/Plan:  Status post 2-vessel coronary artery bypass grafting on 12/22/2017.   - Status post IABP    - he remains on vasopressor therapy, I have actively titrated these again   - chest tubes out   - Lasix drip, monitor volume status and hemodynamics closely   - Postoperative brief cardiac arrest, now awake and following/sedated  Acute hypoxic respiratory failure secondary to above.   - intubated 12/22   - Continue full ventilator support today, SBTs as c linically appropriate   - s/p trach 12/31   - serial ABG/CXR and vent mechanics review   - LTACH eval   - sedation vacations - try to swap Prop to DEX - lighten sedation   - Mobilize when safe as tolerated  ABN CXR - bilateral effusion/atx    - L effuison worse - L thoracentesis - hold dose Lovenox - tap in AM by IR   - monitor for need for Tx FOB after thoracentesis  AF/RVR   - Amiodarone   - Digoxin   - Anti-coag   - ERP keep K > 4 and Mg > 2  Coronary artery disease with multivessel disease - ASA/full antiocoag  Pneumonia   - MSSA and Klebsiella identified   - Antibiotics de-escalated to Ancef, continue ×10 days - stop 1/5  Cardiomyopathy EF 35% and global systolic dysfunction - ionotropes/forced diuresis as needed  Severely dilated right ventricle - caution with volume excess  Chronic obstructive pulmonary disease - RT protocosl  Reported diabetes.   - ssi/NPH and q6 FS  Dyslipidemia - statin  Clinically with obstructive sleep apnea - slow removal of trach - PSG later?  Prophylaxis  LTACH consult pending - not ready yet    Prognosis guarded.  Critically ill with unstable cardio-pulmonary status on amiodarone/ on/offIV pressors and full vent support.  Thoracentesis by IR at the bedside, studies  pending.  Therapeutic and diagnostic bronchoscopy at the bedside, left lower lobe BAL sent, monitoring for the need to treat for HCAP.  High risk of deterioration and worsening vital organ dysfunction and death without the above critical care interventions.    Discussed patient condition and risk of morbidity and/or mortality with Family, RN, RT, Pharmacy, Dietary, , Charge nurse / hot rounds, CVS and U/s radiologist and an interventional radiologist.    The patient remains critically ill.  Critical care time = 40 minutes in directly providing and coordinating critical care and extensive data review.  No time overlap and excludes procedures.

## 2018-01-03 NOTE — DISCHARGE PLANNING
Care Transition Team Assessment    Information Source  Orientation : Unable to Assess  Information Given By: Spouse  Informant's Name: Lisa  Who is responsible for making decisions for patient? : Patient    Readmission Evaluation  Is this a readmission?: No    Elopement Risk  Legal Hold: No  Ambulatory or Self Mobile in Wheelchair: No-Not an Elopement Risk  Disoriented:  (unable to assess)  Psychiatric Symptoms: None  History of Wandering: No  Elopement this Admit: No  Vocalizing Wanting to Leave: No  Displays Behaviors, Body Language Wanting to Leave: No-Not at Risk for Elopement  Elopement Risk: Not at Risk for Elopement    Interdisciplinary Discharge Planning  Does Admitting Nurse Feel This Could be a Complex Discharge?: No  Primary Care Physician: not at this time  Lives with - Patient's Self Care Capacity: Spouse  Patient or legal guardian wants to designate a caregiver (see row info): No  Caregiver name: Lisa  Caregiver relationship to patient: Wife  Caregiver contact info: [phone]  Support Systems: Spouse / Significant Other  Housing / Facility: 1 Story Apartment / Condo  Do You Take your Prescribed Medications Regularly: Yes  Able to Return to Previous ADL's: Yes  Mobility Issues: No  Prior Services: None  Patient Expects to be Discharged to:: home  Assistance Needed: No  Durable Medical Equipment: Not Applicable    Discharge Preparedness  What is your plan after discharge?: Other (comment) (LTAC)  What are your discharge supports?: Child, Spouse  Prior Functional Level: Independent with Activities of Daily Living, Independent with Medication Management    Functional Assesment  Prior Functional Level: Independent with Activities of Daily Living, Independent with Medication Management    Finances  Prescription Coverage: Yes    Vision / Hearing Impairment  Vision Impairment : Yes  Right Eye Vision: Wears Glasses  Left Eye Vision: Wears Glasses  Hearing Impairment : No    Values / Beliefs /  Concerns  Values / Beliefs Concerns : No    Advance Directive  Advance Directive?: None  Advance Directive offered?: AD Booklet given    Domestic Abuse  Have you ever been the victim of abuse or violence?: No  Physical Abuse or Sexual Abuse: No  Verbal Abuse or Emotional Abuse: No  Possible Abuse Reported to:: Not Applicable              Anticipated Discharge Information  Anticipated discharge disposition: Acute care hospital  Discharge Address: [facility]

## 2018-01-03 NOTE — THERAPY
"Physical Therapy Evaluation completed.   Bed Mobility:  Supine to Sit: Minimal Assist  Transfers: Sit to Stand: Unable to Participate  Gait: Level Of Assist: Unable to Participate  Plan of Care: Will benefit from Physical Therapy 3 times per week  Discharge Recommendations: Equipment: Will Continue to Assess for Equipment Needs.     See \"Rehab Therapy-Acute\" Patient Summary Report for complete documentation.     "

## 2018-01-03 NOTE — PROGRESS NOTES
Bedside report received from MNI Wilkins and pt care assumed at 1900.  Lines and infusions verified.

## 2018-01-03 NOTE — DISCHARGE PLANNING
Medical SW    Referral: Obey called [facility] to f/u w/ yesterday's referral.    Intervention: Obey left message for admissions to please contact Obey this AM w/ update.     Rounds: pt to have thoracentesis procedure today and waiting for wife to sign release    Plan: Sw to assist w/ d/c planning as needed.

## 2018-01-03 NOTE — CARE PLAN
Problem: Ventilation Defect:  Goal: Ability to achieve and maintain unassisted ventilation or tolerate decreased levels of ventilator support  Adult Ventilation Update    Total Vent Days: 12  Trach Days: 3    Patient Lines/Drains/Airways Status    Active Airway     Name: Placement date: Placement time: Site: Days:    Airway Group Standard Cuffed Trach Tracheostomy 8.0 12/31/17   1106   Tracheostomy   2              #FVC / Vital Capacity (liters) : 1.1 (01/02/18 0802)  NIF (cm H2O) : -23 (01/02/18 0802)  Rapid Shallow Breathing Index (RR/VT): 59 (01/02/18 0802)    Cough: Productive (01/02/18 1122)  Sputum Amount: Small (01/02/18 1122)  Sputum Color: White;Yellow (01/02/18 1122)  Sputum Consistency: Thick (01/02/18 1122)      Events/Summary/Plan: pt SBT for total of 2.5 hours today, per Dr. Rafita soria to SBT up to 4 hours per trial

## 2018-01-03 NOTE — CARE PLAN
Problem: Ventilation Defect:  Goal: Ability to achieve and maintain unassisted ventilation or tolerate decreased levels of ventilator support  Adult Ventilation Update    Total Vent Days: 13    Patient Lines/Drains/Airways Status    Active Airway     Name: Placement date: Placement time: Site: Days:    Airway Group Standard Cuffed Trach Tracheostomy 8.0 12/31/17   1106   Tracheostomy   2              In the last 24 hours, the patient tolerated SBT for 2.5 hours on settings of 5/8.    #FVC / Vital Capacity (liters) : 1.1 (01/02/18 0802)  NIF (cm H2O) : -23 (01/02/18 0802)  Rapid Shallow Breathing Index (RR/VT): 59 (01/02/18 0802)  Plateau Pressure (Q Shift): 20 (01/02/18 1935)  Static Compliance (ml / cm H2O): 56 (01/03/18 0412)      Sputum/Suction   Cough: Productive (01/03/18 0000)  Sputum Amount: Large (01/03/18 0000)  Sputum Color: White;Yellow (01/03/18 0000)  Sputum Consistency: Thick (01/03/18 0000)      Events/Summary/Plan: No changes made to vent this shift.

## 2018-01-03 NOTE — PROGRESS NOTES
Cardiology Progress Note               Author: Jerry Reyes Date & Time created: 1/3/2018  8:05 AM     Interval History:  On SBT this morning and profoundly SOB, appears uncomfortable  Converted to NSR yesterday afternoon, now in a-fib  Hypertensive during SBT today  Weaned to one pressor  EF by echo 25-30% yesterday  Net -2.3L yesterday    Review of Systems   Unable to perform ROS: Intubated       Physical Exam   Constitutional: He appears well-developed and well-nourished. No distress.   HENT:   Head: Normocephalic.   Mouth/Throat: Oropharynx is clear and moist.   Eyes: EOM are normal. Pupils are equal, round, and reactive to light. Right eye exhibits no discharge. Left eye exhibits no discharge. No scleral icterus.   Neck: Normal range of motion. Neck supple. No JVD present. No tracheal deviation present.   Cardiovascular: Normal rate, regular rhythm, S1 normal, S2 normal, normal heart sounds, intact distal pulses and normal pulses.  Exam reveals no gallop, no S3, no S4 and no friction rub.    No murmur heard.   No systolic murmur is present    No diastolic murmur is present   Pulses:       Carotid pulses are 2+ on the right side, and 2+ on the left side.       Radial pulses are 2+ on the right side, and 2+ on the left side.        Dorsalis pedis pulses are 2+ on the right side, and 2+ on the left side.        Posterior tibial pulses are 2+ on the right side, and 2+ on the left side.   Pulmonary/Chest: Effort normal and breath sounds normal. No respiratory distress. He has no wheezes. He has no rales.   Abdominal: Soft. Bowel sounds are normal. He exhibits no distension and no mass. There is no tenderness. There is no rebound and no guarding.   Musculoskeletal: He exhibits no edema.   Neurological: No cranial nerve deficit.   Skin: Skin is warm and dry. He is not diaphoretic. No pallor.   Nursing note and vitals reviewed.      Hemodynamics:  Temp (24hrs), Av.4 °C (101.2 °F), Min:38.1 °C (100.6 °F),  Max:38.8 °C (101.8 °F)  Temperature: (!) 38.4 °C (101.1 °F)  Pulse  Av.5  Min: 0  Max: 221Heart Rate (Monitored): (!) 128  NIBP: 116/58  CVP (mm Hg): (!) 265 MM HG  Respiratory:  Leo Vent Mode: APVCMV, Rate (breaths/min): 26, PEEP/CPAP: 8, FiO2: 50, P Peak (PIP): 30, P MEAN: 16 Respiration: (!) 31, Pulse Oximetry: 97 %     Work Of Breathing / Effort: Vented  RUL Breath Sounds: Diminished, RML Breath Sounds: Diminished, RLL Breath Sounds: Diminished, GENNARO Breath Sounds: Diminished, LLL Breath Sounds: Diminished  Fluids:     Weight: (!) 150 kg (330 lb 11 oz)  GI/Nutrition:  No orders of the defined types were placed in this encounter.    Lab Results:  Recent Labs      18   0537  18   0510  18   0615   WBC  26.9*  28.7*  27.8*   RBC  3.49*  3.72*  3.45*   HEMOGLOBIN  8.3*  8.8*  8.1*   HEMATOCRIT  28.4*  30.2*  28.5*   MCV  81.4  81.2*  82.6   MCH  23.8*  23.7*  23.5*   MCHC  29.2*  29.1*  28.4*   RDW  60.6*  60.6*  61.6*   PLATELETCT  620*  697*  659*   MPV  10.3  10.2  10.5     Recent Labs      18   0537   18   0510   18   1710  18   2349  18   0615   SODIUM  141   --   139   --    --    --   142   POTASSIUM  3.5*   < >  3.8   < >  4.1  3.8  4.0   CHLORIDE  102   --   101   --    --    --   103   CO2  33   --   31   --    --    --   33   GLUCOSE  154*   --   175*   --    --    --   155*   BUN  47*   --   49*   --    --    --   54*   CREATININE  1.26   --   1.18   --    --    --   1.34   CALCIUM  8.3*   --   8.1*   --    --    --   8.0*    < > = values in this interval not displayed.                 Recent Labs      18   0510   TRIGLYCERIDE  106         Medical Decision Making, by Problem:  Active Hospital Problems    Diagnosis   • CAD (coronary artery disease) [I25.10]   • CHF (congestive heart failure) (CMS-[facility]) [I50.9]       Plan:  51 yo M with CAD s/p CABG x 2, unable to bypass RCA, CHFrEF from ICM, EF 25-30%.  We will continue sedation holidays and  avoid propofol.  His BPs are improving however we will see how he does once he is back on the vent.  For now, I would continue to hold off on cardioversion.     1. CAD s/p CABG    - cont asa    - cont rosuva to 40     2. CHFrEF, EF 25-30%    - cont dig 0.25 daily    - wean pressors     3. HLD    - per above     4. Hypotension, shock    - defer to primary team    5. WBC/Resp failure    - defer    Quality-Core Measures

## 2018-01-03 NOTE — PROGRESS NOTES
Amiodarone infusion started at 1714.  Verified with KAYLIE Cline to give full PO dose of amiodarone.

## 2018-01-03 NOTE — PROGRESS NOTES
Went up to patients room and scanned left chest.  Found a pocket of fluid.  Called Dr Sprague and he came and performed the thoracentesis.  No fluid was able to be collected. Vitals were monitored during procedure by floor nurse.  Patient tolerated procedure well and was in stable condition when I left.  Suggest to nurse that ordering doctor call Dr Sprague to discuss other options

## 2018-01-04 ENCOUNTER — APPOINTMENT (OUTPATIENT)
Dept: RADIOLOGY | Facility: MEDICAL CENTER | Age: 53
DRG: 003 | End: 2018-01-04
Attending: INTERNAL MEDICINE
Payer: MEDICARE

## 2018-01-04 LAB
ANION GAP SERPL CALC-SCNC: 5 MMOL/L (ref 0–11.9)
BUN SERPL-MCNC: 46 MG/DL (ref 8–22)
CALCIUM SERPL-MCNC: 7.8 MG/DL (ref 8.5–10.5)
CHLORIDE SERPL-SCNC: 106 MMOL/L (ref 96–112)
CO2 SERPL-SCNC: 32 MMOL/L (ref 20–33)
CREAT SERPL-MCNC: 1.17 MG/DL (ref 0.5–1.4)
ERYTHROCYTE [DISTWIDTH] IN BLOOD BY AUTOMATED COUNT: 63.5 FL (ref 35.9–50)
GFR SERPL CREATININE-BSD FRML MDRD: >60 ML/MIN/1.73 M 2
GLUCOSE BLD-MCNC: 138 MG/DL (ref 65–99)
GLUCOSE BLD-MCNC: 157 MG/DL (ref 65–99)
GLUCOSE BLD-MCNC: 174 MG/DL (ref 65–99)
GLUCOSE BLD-MCNC: 174 MG/DL (ref 65–99)
GLUCOSE BLD-MCNC: 175 MG/DL (ref 65–99)
GLUCOSE BLD-MCNC: 204 MG/DL (ref 65–99)
GLUCOSE BLD-MCNC: 239 MG/DL (ref 65–99)
GLUCOSE SERPL-MCNC: 181 MG/DL (ref 65–99)
GRAM STN SPEC: NORMAL
GRAM STN SPEC: NORMAL
HCT VFR BLD AUTO: 28.3 % (ref 42–52)
HGB BLD-MCNC: 8 G/DL (ref 14–18)
MCH RBC QN AUTO: 23.6 PG (ref 27–33)
MCHC RBC AUTO-ENTMCNC: 28.3 G/DL (ref 33.7–35.3)
MCV RBC AUTO: 83.5 FL (ref 81.4–97.8)
PLATELET # BLD AUTO: 671 K/UL (ref 164–446)
PMV BLD AUTO: 10.3 FL (ref 9–12.9)
POTASSIUM SERPL-SCNC: 3.6 MMOL/L (ref 3.6–5.5)
POTASSIUM SERPL-SCNC: 3.8 MMOL/L (ref 3.6–5.5)
POTASSIUM SERPL-SCNC: 4 MMOL/L (ref 3.6–5.5)
POTASSIUM SERPL-SCNC: 4 MMOL/L (ref 3.6–5.5)
RBC # BLD AUTO: 3.39 M/UL (ref 4.7–6.1)
RHODAMINE-AURAMINE STN SPEC: NORMAL
RHODAMINE-AURAMINE STN SPEC: NORMAL
SIGNIFICANT IND 70042: NORMAL
SITE SITE: NORMAL
SODIUM SERPL-SCNC: 143 MMOL/L (ref 135–145)
SOURCE SOURCE: NORMAL
WBC # BLD AUTO: 27.9 K/UL (ref 4.8–10.8)

## 2018-01-04 PROCEDURE — A9270 NON-COVERED ITEM OR SERVICE: HCPCS | Performed by: INTERNAL MEDICINE

## 2018-01-04 PROCEDURE — 700102 HCHG RX REV CODE 250 W/ 637 OVERRIDE(OP): Performed by: INTERNAL MEDICINE

## 2018-01-04 PROCEDURE — A9270 NON-COVERED ITEM OR SERVICE: HCPCS | Performed by: CLINICAL NURSE SPECIALIST

## 2018-01-04 PROCEDURE — 700102 HCHG RX REV CODE 250 W/ 637 OVERRIDE(OP): Performed by: CLINICAL NURSE SPECIALIST

## 2018-01-04 PROCEDURE — A9270 NON-COVERED ITEM OR SERVICE: HCPCS | Performed by: NURSE PRACTITIONER

## 2018-01-04 PROCEDURE — A9270 NON-COVERED ITEM OR SERVICE: HCPCS | Performed by: THORACIC SURGERY (CARDIOTHORACIC VASCULAR SURGERY)

## 2018-01-04 PROCEDURE — 700111 HCHG RX REV CODE 636 W/ 250 OVERRIDE (IP): Performed by: INTERNAL MEDICINE

## 2018-01-04 PROCEDURE — 770022 HCHG ROOM/CARE - ICU (200)

## 2018-01-04 PROCEDURE — 700102 HCHG RX REV CODE 250 W/ 637 OVERRIDE(OP): Performed by: THORACIC SURGERY (CARDIOTHORACIC VASCULAR SURGERY)

## 2018-01-04 PROCEDURE — 700112 HCHG RX REV CODE 229: Performed by: CLINICAL NURSE SPECIALIST

## 2018-01-04 PROCEDURE — 700102 HCHG RX REV CODE 250 W/ 637 OVERRIDE(OP): Performed by: NURSE PRACTITIONER

## 2018-01-04 PROCEDURE — 71045 X-RAY EXAM CHEST 1 VIEW: CPT

## 2018-01-04 PROCEDURE — 700101 HCHG RX REV CODE 250: Performed by: INTERNAL MEDICINE

## 2018-01-04 PROCEDURE — 80048 BASIC METABOLIC PNL TOTAL CA: CPT

## 2018-01-04 PROCEDURE — 85027 COMPLETE CBC AUTOMATED: CPT

## 2018-01-04 PROCEDURE — 700111 HCHG RX REV CODE 636 W/ 250 OVERRIDE (IP): Performed by: THORACIC SURGERY (CARDIOTHORACIC VASCULAR SURGERY)

## 2018-01-04 PROCEDURE — 94003 VENT MGMT INPAT SUBQ DAY: CPT

## 2018-01-04 PROCEDURE — 700105 HCHG RX REV CODE 258: Performed by: INTERNAL MEDICINE

## 2018-01-04 PROCEDURE — 82962 GLUCOSE BLOOD TEST: CPT

## 2018-01-04 PROCEDURE — 700105 HCHG RX REV CODE 258: Performed by: THORACIC SURGERY (CARDIOTHORACIC VASCULAR SURGERY)

## 2018-01-04 PROCEDURE — 84132 ASSAY OF SERUM POTASSIUM: CPT | Mod: 91

## 2018-01-04 RX ORDER — ALPRAZOLAM 0.5 MG/1
0.5 TABLET ORAL 3 TIMES DAILY PRN
Status: DISCONTINUED | OUTPATIENT
Start: 2018-01-04 | End: 2018-01-07

## 2018-01-04 RX ORDER — POTASSIUM CHLORIDE 7.45 MG/ML
10 INJECTION INTRAVENOUS ONCE
Status: COMPLETED | OUTPATIENT
Start: 2018-01-04 | End: 2018-01-04

## 2018-01-04 RX ADMIN — INSULIN HUMAN 5 UNITS: 100 INJECTION, SUSPENSION SUBCUTANEOUS at 05:24

## 2018-01-04 RX ADMIN — CHLORHEXIDINE GLUCONATE 15 ML: 1.2 RINSE ORAL at 09:04

## 2018-01-04 RX ADMIN — POTASSIUM CHLORIDE 10 MEQ: 7.46 INJECTION, SOLUTION INTRAVENOUS at 08:01

## 2018-01-04 RX ADMIN — POTASSIUM BICARBONATE 50 MEQ: 25 TABLET, EFFERVESCENT ORAL at 00:20

## 2018-01-04 RX ADMIN — ALPRAZOLAM 0.5 MG: 0.5 TABLET ORAL at 17:44

## 2018-01-04 RX ADMIN — CEFAZOLIN SODIUM 2 G: 2 INJECTION, SOLUTION INTRAVENOUS at 13:31

## 2018-01-04 RX ADMIN — EPINEPHRINE 0.06 MCG/KG/MIN: 1 INJECTION, SOLUTION INTRAMUSCULAR; SUBCUTANEOUS at 07:56

## 2018-01-04 RX ADMIN — POTASSIUM BICARBONATE 50 MEQ: 25 TABLET, EFFERVESCENT ORAL at 23:57

## 2018-01-04 RX ADMIN — CEFAZOLIN SODIUM 2 G: 2 INJECTION, SOLUTION INTRAVENOUS at 21:43

## 2018-01-04 RX ADMIN — INSULIN HUMAN 5 UNITS: 100 INJECTION, SUSPENSION SUBCUTANEOUS at 13:44

## 2018-01-04 RX ADMIN — DEXMEDETOMIDINE HYDROCHLORIDE 0.6 MCG/KG/HR: 4 INJECTION, SOLUTION INTRAVENOUS at 09:34

## 2018-01-04 RX ADMIN — DIGOXIN 250 MCG: 125 TABLET ORAL at 17:44

## 2018-01-04 RX ADMIN — AMIODARONE HYDROCHLORIDE 400 MG: 200 TABLET ORAL at 21:42

## 2018-01-04 RX ADMIN — FAMOTIDINE 20 MG: 20 TABLET, FILM COATED ORAL at 09:04

## 2018-01-04 RX ADMIN — CEFAZOLIN SODIUM 2 G: 2 INJECTION, SOLUTION INTRAVENOUS at 05:08

## 2018-01-04 RX ADMIN — OXYCODONE HYDROCHLORIDE 10 MG: 10 TABLET ORAL at 14:42

## 2018-01-04 RX ADMIN — ASPIRIN 81 MG: 81 TABLET, CHEWABLE ORAL at 09:04

## 2018-01-04 RX ADMIN — POTASSIUM CHLORIDE 20 MEQ: 2 INJECTION, SOLUTION, CONCENTRATE INTRAVENOUS at 14:05

## 2018-01-04 RX ADMIN — POTASSIUM CHLORIDE 10 MEQ: 2 INJECTION, SOLUTION, CONCENTRATE INTRAVENOUS at 19:37

## 2018-01-04 RX ADMIN — CHLORHEXIDINE GLUCONATE 15 ML: 1.2 RINSE ORAL at 21:43

## 2018-01-04 RX ADMIN — ENOXAPARIN SODIUM 150 MG: 150 INJECTION SUBCUTANEOUS at 09:09

## 2018-01-04 RX ADMIN — POTASSIUM BICARBONATE 50 MEQ: 25 TABLET, EFFERVESCENT ORAL at 17:43

## 2018-01-04 RX ADMIN — POTASSIUM CHLORIDE 10 MEQ: 7.46 INJECTION, SOLUTION INTRAVENOUS at 03:11

## 2018-01-04 RX ADMIN — ENOXAPARIN SODIUM 150 MG: 150 INJECTION SUBCUTANEOUS at 21:43

## 2018-01-04 RX ADMIN — NYSTATIN 500000 UNITS: 100000 SUSPENSION ORAL at 12:44

## 2018-01-04 RX ADMIN — AMIODARONE HYDROCHLORIDE 400 MG: 200 TABLET ORAL at 09:14

## 2018-01-04 RX ADMIN — NYSTATIN 500000 UNITS: 100000 SUSPENSION ORAL at 21:43

## 2018-01-04 RX ADMIN — DEXMEDETOMIDINE HYDROCHLORIDE 0.1 MCG/KG/HR: 4 INJECTION, SOLUTION INTRAVENOUS at 19:02

## 2018-01-04 RX ADMIN — NYSTATIN 500000 UNITS: 100000 SUSPENSION ORAL at 09:04

## 2018-01-04 RX ADMIN — DEXMEDETOMIDINE HYDROCHLORIDE 1 MCG/KG/HR: 4 INJECTION, SOLUTION INTRAVENOUS at 06:05

## 2018-01-04 RX ADMIN — FUROSEMIDE 80 MG: 10 INJECTION, SOLUTION INTRAMUSCULAR; INTRAVENOUS at 05:08

## 2018-01-04 RX ADMIN — FAMOTIDINE 20 MG: 20 TABLET, FILM COATED ORAL at 21:43

## 2018-01-04 RX ADMIN — FUROSEMIDE 80 MG: 10 INJECTION, SOLUTION INTRAMUSCULAR; INTRAVENOUS at 16:12

## 2018-01-04 RX ADMIN — OXYCODONE HYDROCHLORIDE 10 MG: 10 TABLET ORAL at 01:31

## 2018-01-04 RX ADMIN — ALPRAZOLAM 0.5 MG: 0.5 TABLET ORAL at 09:14

## 2018-01-04 RX ADMIN — NYSTATIN 500000 UNITS: 100000 SUSPENSION ORAL at 16:21

## 2018-01-04 RX ADMIN — POTASSIUM BICARBONATE 50 MEQ: 25 TABLET, EFFERVESCENT ORAL at 12:44

## 2018-01-04 RX ADMIN — DOCUSATE SODIUM 100 MG: 50 LIQUID ORAL at 09:09

## 2018-01-04 RX ADMIN — ROSUVASTATIN CALCIUM 40 MG: 10 TABLET, FILM COATED ORAL at 21:42

## 2018-01-04 RX ADMIN — POTASSIUM BICARBONATE 50 MEQ: 25 TABLET, EFFERVESCENT ORAL at 05:08

## 2018-01-04 RX ADMIN — DEXMEDETOMIDINE HYDROCHLORIDE 1.2 MCG/KG/HR: 4 INJECTION, SOLUTION INTRAVENOUS at 02:50

## 2018-01-04 RX ADMIN — INSULIN HUMAN 5 UNITS: 100 INJECTION, SUSPENSION SUBCUTANEOUS at 23:05

## 2018-01-04 NOTE — PROGRESS NOTES
Pulmonary Critical Care Progress Note      DOS:  1/4/18, admit 12/19/2017    Chief Complaint: CAD, 2V CABG    History of Present Illness: 53 y/o M, h/o DM, COPD, DLD, CAD, SHASHANK, Obesity; underwent 2v CABG 12/22    Interval Events:  24 hour interval history reviewed   Reason for visit:  A&C respiratory failure, CAD s/p CABG, AF/flut, Low K/Mg, S/p trach 12/31    S/p thoracentesis for > 1000cc yesteday  Tx/Dx FOB yesterday - BAL sent, some plugs LLL cleared  Follows  Confused/impulsive  DEX 0.6  Xanax pewr CVS  EPI 0.04  AF 70-100s  Labile Bp  NE stopped yesterday  TF goal  Great UO  I/Os neg 1160/24 hrs  Tm 38.5 - WBC still up  SBT failed - RSBI > 120 few minutes  Full dose Lovenox  Ancef 9/10  Micro GNR in BAL?  Pleural neg stain       YESTERDAY  Mildly confused - follows  Amio  Back in AF 120s  KRISTIN off  EPI 0.03  DEX 0.7  FENT 50 -> off now  EOB  L thora pending  Lovensox on hold  PEEP 8 and fiO2 increased to 0.5  Lots secretions  Ancef  8/10    Serial follow-up evaluations performed  Thoracentesis attempted by radiology, unsuccessful  Interventional radiologist requested to perform thoracentesis and successfully performed late afternoon with over 1000 cc removed, studies pending    Low-grade fevers throughout the day, worsening oxygenation requiring PEEP to be increased back from 8-10 words been the last several days. Perhaps increased secretions from tracheostomy tube.  Reviewed with family at bedside, diagnostic and therapeutic bronchoscopy to be performed    Hemodynamics remain okay, blood pressure a little soft/slightly low at times, may be related to blood pressure cuff       YESTERDAY  Sedate on vent - follows some  Trach ok  SBT 1 hr RSBI 59  SBT 1 hr x2 yesterday  CXR better  - less eff/atx on R - L effusion worse?  DEX 0.8  Fent 100  Afib/flutter - rate 70-80s  CVP 7-12  Lasix drip -> Lasix 80 BID IV  Amio to PO  KRISTIN 10  EPI 0.05  Tm 38.3  TF goal   UO adequate - I/Os neg 856  Full dose lovenox - last dose  this AM  Ancef 7/10    D/w family at bedside       YESTERDAY  Sedate on vent  Follows some - less agitated  AF/flut - HR 80s  Bp ok on pressors  Tm 37.9  Trach not bleeding  KRISTIN 50  EPI 0.03  Amio 0.5  Fent 100  Prop 20  Lasix 10/hr drip  Full dose Lovenox  Ancef 6/10  K 3.5  Check Mg  CXR wet/BSSLLA - no change vs old film  Therapies    YESTERDAY   - AF/flutter, , amio .5   - epi, kristin gtts   - lasix gtt   - I/O 4.7/6.5   - vent day 9, min secretions   - cxr unchagned   - CTs out     - Lovenox 150 bid, held for trach    - day 5 of 10 Ancef for MSSA, kleb BAL   - Cr up 1.28   - perc trach today  Yesterday   - POD8   - arouses, follows, restless, prop 60   - AF/flutter, 120's   - amiodarone, gtt .5   - epi gtt, kristin gtt   - lasix gtt 10/hr   - williams TF at 80   - good UOP, I/O 5.5/7.8   - Tm 38.3   - full dose lovenox   - RUE DVT   - vent day 9, 8, 40%; no SBT, cxr edema, atx unchanged   - ancef day 2 for kleb/mssa pna   - will need trach  :  Review of Systems   Unable to perform ROS: Acuity of condition       Physical Exam   Constitutional: He appears well-developed and well-nourished.   Remains confused but follows   HENT:   Head: Normocephalic and atraumatic.   Eyes: Pupils are equal, round, and reactive to light. No scleral icterus.   Neck: No JVD present. No tracheal deviation present.   Cardiovascular: Normal rate and intact distal pulses.  Exam reveals no gallop and no friction rub.    No murmur heard.  Pulmonary/Chest: No respiratory distress. He has rales.   Diminished at bases L>R, scattered coarse crackles, no wheezes - better on R, trach site okay, mildly. Secretions from trach when aspirating airway   Abdominal: Soft. He exhibits no distension. There is no tenderness. There is no rebound and no guarding.   Musculoskeletal: He exhibits edema. He exhibits no deformity.   Trace edema   Neurological: No cranial nerve deficit.   sedate, now following some commands intermittently, moves all extremities    Skin: Skin is warm and dry.   Psychiatric:   Sedate, mood better   Nursing note and vitals reviewed.  No change      PFSH:  No change.    Respiratory:  Leo Vent Mode: APVCMV, Rate (breaths/min): 26, Vt Target (mL): 540, PEEP/CPAP: 10, FiO2: 40, Static Compliance (ml / cm H2O): 52, Control VTE (exp VT): 696  Pulse Oximetry: 98 %    Ventilator mechanics and waveforms are reviewed at the bedside with RT again              Recent Labs      01/02/18   0451   ISTATAPH  7.549*   ISTATAPCO2  36.7   ISTATAPO2  57*   ISTATATCO2  33   TGBSIBK1PTB  93   ISTATARTHCO3  32.0*   ISTATARTBE  9*   ISTATTEMP  37.7 C   ISTATFIO2  40   ISTATSPEC  Arterial   ISTATAPHTC  7.538*   IAHXVCTM3CT  60*       HemoDynamics:  Pulse: 71, Heart Rate (Monitored): (!) 122  NIBP: (!) 98/53  CVP (mm Hg): (!) 14 MM HG            Neuro:  GCS Total Enrique Coma Score: 11         Fluids:  Intake/Output       01/02/18 0700 - 01/03/18 0659 01/03/18 0700 - 01/04/18 0659 01/04/18 0700 - 01/05/18 0659      5148-4091 1664-6663 Total 8676-6131 3352-5209 Total 9309-8446 1622-5502 Total       Intake    I.V.  822.7  946.9 1769.6  1164  241.2 1405.3  --  -- --    Precedex Volume 316.1 306.9 623 360.3 37.8 398.1 -- -- --    Phenylephrine Volume 49 6.9 55.9 -- -- -- -- -- --    Epinephrine Volume 345.6 293.1 638.7 233.7 53.4 287.2 -- -- --    IV Piggyback Volume 100 300 400 450 100 550 -- -- --    NS TKO -- 40 40 120 50 170 -- -- --    IV Volume (Fentanyl ) 12 -- 12 -- -- -- -- -- --    Other  --  -- --  250  -- 250  --  -- --    Medications (P.O./ Enteral Liquids) -- -- -- 250 -- 250 -- -- --    Enteral  --  1020 1020  390  510 900  --  -- --    Enteral Volume -- 1020 1020 340 510 850 -- -- --    Free Water / Tube Flush -- -- -- 50 -- 50 -- -- --    Total Intake 822.7 1966.9 2789.6 1804 751.2 2555.3 -- -- --       Output    Urine  3200  1900 5100  2115  1200 3315  --  -- --    Indwelling Cathether 3200 1900 5100 2115 1200 3315 -- -- --    Total Output 3200 1900  5100 2115 1200 3315 -- -- --       Net I/O     -2377.3 66.9 -2310.4 -311 -448.8 -759.7 -- -- --          Recent Labs      18   1215   18   0510   18   0615  18   1140  18   1730  18   0015   SODIUM   --    --   139   --   142   --    --    --    POTASSIUM  3.7   < >  3.8   < >  4.0  4.2  3.6  3.8   CHLORIDE   --    --   101   --   103   --    --    --    CO2   --    --   31   --   33   --    --    --    BUN   --    --   49*   --   54*   --    --    --    CREATININE   --    --   1.18   --   1.34   --    --    --    MAGNESIUM  2.2   --    --    --    --    --    --    --    CALCIUM   --    --   8.1*   --   8.0*   --    --    --     < > = values in this interval not displayed.       GI/Nutrition:  TF goal  Liver Function  Recent Labs      18   0510  1815   GLUCOSE  175*  155*       Heme:  Recent Labs      18   0510  01/03/18   0615   RBC  3.72*  3.45*   HEMOGLOBIN  8.8*  8.1*   HEMATOCRIT  30.2*  28.5*   PLATELETCT  697*  659*       Infectious Disease:  Temp  Av.4 °C (101.1 °F)  Min: 38.1 °C (100.6 °F)  Max: 38.5 °C (101.3 °F)  Micro: reviewed  Recent Labs      18   0510  01/03/18   0615  01/03/18   1545   WBC  28.7*  27.8*   --    EOSINOPHILS   --    --   1     Current Facility-Administered Medications   Medication Dose Frequency Provider Last Rate Last Dose   • nystatin (MYCOSTATIN) 077843 UNIT/ML suspension 500,000 Units  5 mL 4X/DAY Silvio Eller M.D.   500,000 Units at 18   • enoxaparin (LOVENOX) injection 150 mg  150 mg Q12HRS Kali Evans Jr., D.O.       • furosemide (LASIX) injection 80 mg  80 mg BID DIURETIC Jerry Reyes M.D.   80 mg at 18 1517   • rosuvastatin (CRESTOR) tablet 40 mg  40 mg Q EVENING Jerry Reyes M.D.   40 mg at 18   • digoxin (LANOXIN) tablet 250 mcg  250 mcg DAILY AT 1800 Jerry Reyes M.D.   250 mcg at 18 191   • dexmedetomidine (PRECEDEX) 400 mcg/100mL  premix infusion  0.1-1.5 mcg/kg/hr Continuous Jerry Reyes M.D. 45.4 mL/hr at 01/04/18 0250 1.2 mcg/kg/hr at 01/04/18 0250   • amiodarone (CORDARONE) tablet 400 mg  400 mg TWICE DAILY Gui Delcid M.D.   400 mg at 01/03/18 2128   • docusate sodium 100mg/10mL (COLACE) solution 100 mg  100 mg DAILY Silvia Preston A.P.N.   100 mg at 01/03/18 1132    Or   • docusate sodium (COLACE) capsule 100 mg  100 mg DAILY Silvia Preston A.P.N.   100 mg at 01/02/18 0831   • ceFAZolin (ANCEF) IVPB 2 g  2 g Q8HRS Amrik Banegas M.D.   2 g at 01/04/18 0508   • potassium bicarbonate (KLYTE) 25 MEQ effervescent tablet TBEF 50 mEq  50 mEq Q6HRS Amrik Banegas M.D.   50 mEq at 01/04/18 0508   • aspirin (ASA) chewable tab 81 mg  81 mg DAILY Kiel Ash M.D.   81 mg at 01/03/18 0852   • tetrahydrozoline (VISINE) 0.05 % ophthalmic solution 1 Drop  1 Drop 4X/DAY PRN Amrik Banegas M.D.       • K+ Scale: Goal of 4.5  1 Each Q6HRS SUSI Alfred.P.N.   1 Each at 01/04/18 0134   • insulin NPH (HUMULIN,NOVOLIN) injection 5 Units  5 Units Q8HRS SUSI Mendoza.P.N.   5 Units at 01/04/18 0524   • phenylephrine (KRISTIN-SYNEPHRINE) 80,000 mcg in  mL Infusion  0-50 mcg/min Continuous SUSI Alfred.P.N.   Stopped at 01/02/18 2200   • famotidine (PEPCID) tablet 20 mg  20 mg BID Rodolfo Landa M.D.   20 mg at 01/03/18 2128   • chlorhexidine (PERIDEX) 0.12 % solution 15 mL  15 mL BID Rodolfo Landa M.D.   15 mL at 01/03/18 2128   • fentaNYL (SUBLIMAZE) 50 mcg/mL in 50mL (Continuous Infusion)   Continuous Rodolfo Landa M.D.   Stopped at 01/03/18 1045   • insulin lispro (HUMALOG) injection 0-15 Units  0-15 Units Q6HRS KAMALA AlfredPMAMADOU   3 Units at 01/04/18 0524   • Respiratory Care per Protocol   Continuous RT SUSI Mendoza.P.N.       • NS infusion   Continuous SUSI Mendoza.P.N. 10 mL/hr at 01/03/18 2307     • Pharmacy Consult Request ...Pain Management Review 1 Each  1 Each PRN KAMALA MendozaP.N.        • senna-docusate (PERICOLACE or SENOKOT S) 8.6-50 MG per tablet 1 Tab  1 Tab Nightly Silvia HUI Mohan, A.P.N.   1 Tab at 01/03/18 2128    And   • senna-docusate (PERICOLACE or SENOKOT S) 8.6-50 MG per tablet 1 Tab  1 Tab Q24HRS PRN Silvia Preston, A.P.N.   1 Tab at 12/28/17 0732    And   • lactulose 20 GM/30ML solution 30 mL  30 mL Q24HRS PRN Silvia Preston, A.P.N.   30 mL at 01/03/18 0912    And   • bisacodyl (DULCOLAX) suppository 10 mg  10 mg Q24HRS PRN Silvia Preston, A.P.N.   10 mg at 01/03/18 0912    And   • fleet enema 133 mL  1 Each Once PRN Silvia Preston, A.P.N.       • electrolyte-A (PLASMALYTE-A) infusion   PRN Silvia Preston, A.P.N.       • oxycodone immediate release (ROXICODONE) tablet 5 mg  5 mg Q3HRS PRN Silvia Preston, A.P.N.       • oxycodone immediate release (ROXICODONE) tablet 10 mg  10 mg Q3HRS PRN Silvia Preston, A.P.N.   10 mg at 01/04/18 0131   • morphine (pf) 4 mg/ml injection 4 mg  4 mg Q3HRS PRN Silvia Preston, A.P.N.   4 mg at 01/03/18 1743   • tramadol (ULTRAM) 50 MG tablet 50 mg  50 mg Q4HRS PRN Sivlia Preston, A.P.N.       • sodium bicarbonate 8.4 % injection 50 mEq  50 mEq Q HOUR PRN Silvia Preston, A.P.N.       • morphine (pf) 4 mg/ml injection 4 mg  4 mg Q HOUR PRN Silvia Preston, A.P.N.   4 mg at 12/23/17 0239   • ondansetron (ZOFRAN) syringe/vial injection 4 mg  4 mg Q6HRS PRN Silvia Preston, A.P.N.        Or   • prochlorperazine (COMPAZINE) injection 10 mg  10 mg Q6HRS PRN Silvia Preston, A.P.N.        Or   • promethazine (PHENERGAN) suppository 25 mg  25 mg Q6HRS PRN Silvia Preston, A.P.N.       • acetaminophen (TYLENOL) tablet 650 mg  650 mg Q4HRS PRN Silvia Preston, A.P.N.   650 mg at 01/01/18 1840    Or   • acetaminophen (TYLENOL) suppository 650 mg  650 mg Q4HRS PRN Silvia Preston, A.P.N.       • mag hydrox-al hydrox-simeth (MAALOX PLUS ES or MYLANTA DS) suspension 30 mL  30 mL Q4HRS PRN Silvia Preston, A.P.N.   30 mL at 01/01/18 2683   • diphenhydrAMINE (BENADRYL)  tablet/capsule 25 mg  25 mg HS PRN - MR X 1 Silvia rPeston, A.PAlexN.   25 mg at 01/02/18 0013   • epinephrine 1 mg/mL(1:1000) 8 mg in  mL Infusion  0-0.2 mcg/kg/min Continuous Kiel Ash M.D. 34.5 mL/hr at 01/04/18 0517 0.06 mcg/kg/min at 01/04/18 0517   • albuterol inhaler 2 Puff  2 Puff Q4H PRN (RT) Kiel Ash M.D.       • ipratropium-albuterol (DUONEB) nebulizer solution 3 mL  3 mL Q4H PRN (RT) Kiel Ash M.D.   3 mL at 12/23/17 1154     Last reviewed on 12/22/2017  7:07 AM by Katie Aly R.N.    Quality  Measures:  Labs reviewed, Medications reviewed and Radiology images reviewed                      Assessment/Plan:  Status post 2-vessel coronary artery bypass grafting on 12/22/2017.   - Status post IABP    - EPI drip weaned off Am 1/4   - chest tubes out   - off Lasix drip, monitor volume status and hemodynamics closely   - Postoperative brief cardiac arrest, now awake and following/sedated  Acute hypoxic respiratory failure secondary to above.   - intubated 12/22   - Continue full ventilator support today, SBTs as c linically appropriate   - s/p trach 12/31   - serial ABG/CXR and vent mechanics review   - LTACH eval   - sedation vacations - try to swap Prop to DEX - lighten sedation   - Mobilize when safe as tolerated  ABN CXR - bilateral effusion/atx    - L effuison worse - L thoracentesis - hold dose Lovenox - tap in AM by IR   - monitor for need for Tx FOB after thoracentesis  AF/RVR   - Amiodarone   - Digoxin   - Anti-coag   - ERP keep K > 4 and Mg > 2  Coronary artery disease with multivessel disease - ASA/full antiocoag  Pneumonia   - MSSA and Klebsiella identified   - Antibiotics de-escalated to Ancef, continue ×10 days - stop 1/5  Cardiomyopathy EF 35% and global systolic dysfunction - ionotropes/forced diuresis as needed  Severely dilated right ventricle - caution with volume excess  Chronic obstructive pulmonary disease - RT protocosl  Reported diabetes.   - ssi/NPH  and q6 FS  Dyslipidemia - statin  Clinically with obstructive sleep apnea - slow removal of trach - PSG later?  Prophylaxis  LTACH consult pending - not ready yet.    Discussed patient condition and risk of morbidity and/or mortality with Family, RN, RT, Pharmacy, Dietary, , Charge nurse / hot rounds, CVS and U/s radiologist and an interventional radiologist.

## 2018-01-04 NOTE — PROGRESS NOTES
Cardiovascular Surgery Progress Note    Name: Joshua Medrano  MRN: [id number]  : 1965  Admit Date: 2017 10:21 AM  Procedure:  Procedure(s) and Anesthesia Type:     * MULTIPLE CORONARY ARTERY BYPASS ENDO VEIN HARVEST X2 - General     * JIM - General  13 Day Post-Op    Vitals:  Patient Vitals for the past 8 hrs:   Temp SpO2 O2 Delivery Pulse Heart Rate (Monitored) Resp NIBP Weight   18 0645 - 94 % - 79 97 (!) 23 109/59 -   18 0630 - 96 % - 78 81 (!) 24 (!) 99/52 -   18 0628 - 97 % - - 73 - - -   18 0615 - 97 % - 70 73 (!) 26 (!) 9946 -   18 0600 (!) 38.3 °C (100.9 °F) 96 % - 76 76 (!) 26 (!) 90/47 -   18 0545 - 97 % - 78 82 (!) 26 (!) 94/47 -   18 0530 - 98 % - 77 84 (!) 23 (!) 97/54 -   18 0515 - 96 % - 77 83 (!) 26 (!) 88/58 -   18 0500 - 96 % - 71 (!) 122 20 (!) 98/53 -   18 0445 - 95 % - 82 80 (!) 23 - -   18 0430 - 96 % - 75 85 (!) 34 (!) 98/45 -   18 0415 - 95 % - 71 71 (!) 25 (!) 97/46 -   18 0400 (!) 38.1 °C (100.6 °F) 96 % Ventilator 68 70 (!) 26 (!) 93/46 (!) 152 kg (335 lb 1.6 oz)   18 0345 - 96 % - 69 70 (!) 26 (!) 99/47 -   18 0330 - 96 % - 69 69 (!) 26 (!) 93/47 -   18 0317 - 98 % - - 71 - - -   18 0315 - 95 % - 70 75 (!) 21 - -   18 0300 - 96 % - 71 78 (!) 22 (!) 94/47 -   18 0245 - 96 % - 72 77 18 - -   18 0230 - 95 % - 74 74 (!) 26 - -   18 0215 - 96 % - 75 74 (!) 26 100/48 -   18 0200 (!) 38.4 °C (101.1 °F) 97 % - 83 84 (!) 24 103/60 -   18 0145 - 97 % - 95 (!) 101 (!) 30 - -   18 0130 - 95 % - (!) 122 (!) 111 (!) 22 109/68 -   18 0115 - 96 % - 99 (!) 103 (!) 23 104/58 -   18 0100 - 96 % - 95 (!) 104 (!) 26 111/56 -     Temp (24hrs), Av.4 °C (101.1 °F), Min:38.1 °C (100.6 °F), Max:38.5 °C (101.3 °F)      Respiratory:  Leo Vent Mode: APVCMV, Rate (breaths/min): 26, PEEP/CPAP: 10, FiO2: 40, P Peak (PIP): 26, P MEAN: 16  Respiration: (!) 23, Pulse Oximetry: 94 %     Chest Tube Drains:          Fluids:    Intake/Output Summary (Last 24 hours) at 01/04/18 0855  Last data filed at 01/04/18 0800   Gross per 24 hour   Intake          2877.66 ml   Output             4665 ml   Net         -1787.34 ml     Admit weight: Weight: (!) 159.2 kg (350 lb 15.6 oz)  Current weight: Weight: (!) 152 kg (335 lb 1.6 oz) (01/04/18 0400)    Labs:  Recent Labs      01/02/18   0510  01/03/18   0615  01/04/18   0520   WBC  28.7*  27.8*  27.9*   RBC  3.72*  3.45*  3.39*   HEMOGLOBIN  8.8*  8.1*  8.0*   HEMATOCRIT  30.2*  28.5*  28.3*   MCV  81.2*  82.6  83.5   MCH  23.7*  23.5*  23.6*   MCHC  29.1*  28.4*  28.3*   RDW  60.6*  61.6*  63.5*   PLATELETCT  697*  659*  671*   MPV  10.2  10.5  10.3     Recent Labs      01/03/18   1545   EOSINOPHILS  1     Recent Labs      01/02/18   0510   01/03/18   0615   01/03/18   1730  01/04/18   0015  01/04/18   0520   SODIUM  139   --   142   --    --    --   143   POTASSIUM  3.8   < >  4.0   < >  3.6  3.8  4.0   CHLORIDE  101   --   103   --    --    --   106   CO2  31   --   33   --    --    --   32   GLUCOSE  175*   --   155*   --    --    --   181*   BUN  49*   --   54*   --    --    --   46*   CREATININE  1.18   --   1.34   --    --    --   1.17   CALCIUM  8.1*   --   8.0*   --    --    --   7.8*    < > = values in this interval not displayed.           Medications:  • potassium chloride (KCL-CENTRAL) IV *Administer in ICU only*  10 mEq     • nystatin  5 mL     • enoxaparin (LOVENOX) injection  150 mg     • furosemide  80 mg     • rosuvastatin  40 mg     • digoxin  250 mcg     • amiodarone  400 mg     • docusate sodium 100mg/10mL  100 mg      Or   • docusate sodium  100 mg     • ceFAZolin  2 g     • potassium bicarbonate  50 mEq     • aspirin  81 mg     • K+ Scale: Goal of 4.5  1 Each     • insulin NPH  5 Units     • famotidine  20 mg     • chlorhexidine  15 mL     • insulin lispro  0-15 Units     • senna-docusate  1  Tab         Exam:   Review of Systems   Unable to perform ROS: Intubated       Physical Exam   Constitutional: No distress. He is intubated.   Morbidly obese   Neck: Neck supple.   Cardiovascular: Normal rate and regular rhythm.  Exam reveals no gallop.    No murmur heard.  Pulmonary/Chest: Effort normal. He is intubated. No respiratory distress. He has decreased breath sounds in the right lower field and the left lower field.   Abdominal: Soft. He exhibits no distension.   Musculoskeletal: He exhibits edema.   Neurological:   sedated   Skin: Skin is warm.   Psychiatric:   JUAREZ       Quality Measures:     Reviewed items::  EKG reviewed, Labs reviewed, Medications reviewed and Radiology images reviewed  Jenkins catheter::  One or Two Days Post Surgery (Day of Surgery being Day 0) and Critically Ill - Requiring Accurate Measurement of Urinary Output  Central line in place:  Need for access and Vasopressors  DVT prophylaxis pharmacological::  Contraindicated - High bleeding risk  DVT prophylaxis - mechanical:  SCDs  Ulcer Prophylaxis::  Yes      Assessment/Plan:  POD 1 - HOTN- IABP 1:2, epi/jazz, vent- peep inc this am, keep CT/jenkins, CPM  POD 2 HOTN - epi/jazz- IABP 1:2, Vent - pulm following, s/p PEA arrest yesterday- bronch with lots of thick secretions, sedated, CPM  POD 3 - HOTN - epi/jazz- decreased from yesterday, IABP 1:2, Vent - CMV 26, PEEP 10, sedated, diurese well yesterday- start lasix gtt today, CPM  POD 4 NSR, Hotn-epi/jazz, down slightly form yesterday.  IABP 1:2 no change in pressures with pump on standby--change to 1:4.  On lasix gtt, diuresing well.  FiO2 down to 40%, PEEP 10.  Continue lasix gtt.  Will plan on removing IABP today. CPM.   POD 5 HDS, SB- d/c amio, epi/jazz- weaning, Vent- per pulm, sedated when awake follows per RN, lasix gtt- cont, CPM  POD 6 HDS, NSR, epi/jazz--weaning.  On lasix gtt.  WBCs up, bronch yesterday now on ABX.  Mod r effusion.  Will tap.  Keep SERGE drain L thigh one more day.Keep  CTs while vented.  CPM.  POD 7 HDS, on low dose epi, off jazz.  NSR. RUE thrombus started on heparin gtt--held this AM for thoracentesis.  Vent 40%/peep 8.  Sedation weaning.  PLAN:  DC temp wires, resume anticoagulation after thoracentesis.  Vent per pulm. Keep CTs today.  POD 8 Remains on epi, no change.  Back in afib yesterday, rebolused amio, now AT vs a flutter?  Cardiology to address. On lovenox.  CXR remains with edema/effusions, unable to perform thoracentesis yesterday. Keep lasix gtt today and k-scale.   Agitated, kept on sedation.  Vent per PMA.  May need to move toward trach next few days.  Plan to hold lovenox in AM to DC CTs and SERGE drain.  DC prevena, DC staples EVH site. CPM.  POD 9 Epi/jazz/lasix, ST, VDRF, sedated.  Lasix gtt.  PLAN: Vent per PMA,  Lovenox held for trach likely today.  DC SERGE drain.  CPM.   POD 10 Epi/jazz/lasix, s/p trach, sedated but more alert/cooperative this AM.  Some serosanguinous oozing SERGE exit site.  Echo today per cardiology. CPM.  POD 11 Still on epi and jazz drips.  Awake alert.  Follows commands.  Awaiting echo.  Once off drips to LTAC for rehab.  Moderate left pleural effusion.  Will order bedside tap.   POD 12 Paroxysmal afib, anticoagulated on lovenox, cards following.  Off jazz, low dose epi only.  More alert today, followsCRYSTAL. Thoracentesis planned for today.  CPM.  Push PT/OT.  POD 13 Paroxysmal afib, back on epi.  1000ml off L thoracentesis yesterday and s/p bronch by pulmonary.  To cardiac chair today.  Patient anxious at night, on precedex. Add PRN xanax, wean off precedex. Wean off epi as able.  CPM.        Active Hospital Problems    Diagnosis   • CAD (coronary artery disease) [I25.10]   • CHF (congestive heart failure) (CMS-HCC) [I50.9]

## 2018-01-04 NOTE — PROGRESS NOTES
Bedside report received from MIN Wilkins and pt care assumed at 1900.  Lines and infusions verified.  Plan of care discussed with pt.  No family at bedside.  Pt agitated and restless.  Precedex titrated up oer MAR to achieve RASS of 0. Cortrak verified via xray.  Peptamen Bariatric restarted at 85 mL/hr per hour.

## 2018-01-04 NOTE — PROGRESS NOTES
PROCEDURE: LT thoracentesis  SONOGRAPHER: Rachael DANIELLE  RADIOLOGIST: Dr. Morgan    PT RLD for LT thoracentesis done portably. PT's vitals were monitored by CIC RN. 1000 mL was removed from LT chest and sent to lab. PT tolerated procedure well. Portable chest xray was ordered. I left with PT in stable condition.

## 2018-01-04 NOTE — CARE PLAN
Problem: Post Op Day 4 CABG/Heart Valve Replacement  Goal: Optimal care of the Post Op CABG/Heart Valve replacement Post Op Day 4    Intervention: Daily Weights  Done       Problem: Mechanical Ventilation  Goal: Safe management of artificial airway and ventilation  Oral care, suctioning once or twice per encounter, moderate, thick, secretions, HOB at 30 degrees, ambu bag at bedside, ventilator plugged into red outlet.  Trach care completed.       Problem: Hemodynamic Status  Goal: Stable hemodynamics, hemostasis, fluid/electrolyte balance and rhythm control  Titrating epinephrine to maintain SBP greater than 90.

## 2018-01-04 NOTE — DISCHARGE PLANNING
Medical SW    Referral: Sw attended AM IDT Rounds.    Intervention: po day 13, moving all extremities, impulsive at times, tube feeding, BM yesterday, 2 person transfer.    -Obey spoke to liaison Ioana w/ LTAC. She is wondering if pt is medically cleared to d/c today.    -Obey spoke to charge RN Mu. Pt is not medically cleared to d/c. Obey advised Ioana accordingly.      Plan: Sw to assist w/ d/c planning as needed.

## 2018-01-04 NOTE — CARE PLAN
Problem: Ventilation Defect:  Goal: Ability to achieve and maintain unassisted ventilation or tolerate decreased levels of ventilator support  Adult Ventilation Update    Total Vent Days: 13  Trach Days: 4    Patient Lines/Drains/Airways Status    Active Airway     Name: Placement date: Placement time: Site: Days:    Airway Group Standard Cuffed Trach Tracheostomy 8.0 12/31/17   1106   Tracheostomy   3            APVcmv 26, 540, +10, 40%    Barriers to SBT Weaning Trial Stopped due to:: Abnormal breathing pattern (paradoxical respiratory pattern, use of accessory muscles, etc);SpO2 <90% (01/03/18 0801)  Length of Weaning Trial Length of Weaning Trial (Hours): 1 hour 20 minutes (01/03/18 0801)    Cough: Productive (01/03/18 0952)  Sputum Amount: Large (01/03/18 1200)  Sputum Color: White;Yellow (01/03/18 1200)  Sputum Consistency: Thick (01/03/18 1200)    Mobility Group  Activity Performed: Edge of bed (01/03/18 1000)  Time Activity Tolerated: 5 min (01/03/18 1000)  Distance Per Occurrence (ft.): 200 feet (12/21/17 2030)  # of Times Distance was Traveled: 2 (12/21/17 2030)  Assistance / Tolerance: Assistance of Two or More;Tires quickly (01/03/18 1000)  Pt Calls for Assistance: No (01/03/18 1000)  Staff Present for Mobilization: RN;PT (01/03/18 1000)  Gait: Steady (12/21/17 2030)  Assistive Devices: Hand held assist (01/01/18 0800)  Reason Not Mobilized: Unstable condition (12/30/17 1903)  Mobilization Comments: Pt unable to participate (01/01/18 0800)    Events/Summary/Plan: 1 SBT for 1 hour 20 minutes, PEEP to 10, bronch done today

## 2018-01-04 NOTE — PROGRESS NOTES
Per Claudette RN pt was on lovenox that was held for thoracentis in am.  Lovenox order was discontinued by pharmacy.  Discussed anticoagulation with Dr. Evans.  Lovenox order entered.

## 2018-01-04 NOTE — CARE PLAN
Problem: Post Op Day 4 CABG/Heart Valve Replacement  Goal: Optimal care of the Post Op CABG/Heart Valve replacement Post Op Day 4    Intervention: Daily Weights  Done on NOC shift  Intervention: Shower daily and clean incisions twice daily with soap and water  Done   Intervention: Up in chair for all meals  Currently trached and on tube feeds via cortrak  Intervention: Ambulate, increasing the distance each time x 3 and before bed  Mobilizing edge of bed for 8 minutes  Intervention: IS q 1 hour while awake and record best IS volume  N/A  Intervention: Consider removal of jenkins, chest tube and pacer wire if not already done  Chest tubes and pacer wires out.  Jenkins still clinically indicated.

## 2018-01-04 NOTE — PROCEDURES
[facility]    Date of service: 1/3/2018    PROCEDURE:  Therapeutic bronchoscopy with diagnostic bronchoalveolar lavage/bronchial lavage.     INDICATIONS:  Therapeutically clear inspisated secretions/plugs, obtain bronchial wash and bronchial alveolar lavage specimens for diagnostic studies. Study performed after large-volume left thoracentesis.    DIAGNOSIS:  Acute hypoxemic respiratory failure, pneumonia, atlectasis - R/o plugs/HCAP.    Timeout observed     DESCRIPTION OF PROCEDURE:  The patient is intubated on full ventilator support.  He had a tracheostomy tube in place. He was sedated with Versed 5 mg and Fentanyl 100 mcg IV for the procedure.  Hemodynamics were acceptable throughout.  The patient was placed on 100% O2 for the procedure.  O2 saturations were 97-99% throughout the procedure.  The flexible fiberoptic bronchoscope was inserted through the adaptor inline with the tracheostomy tube without difficulty.  All airways were examined at the subsegmental level.  No endobronchial masses or anatomic variants were identified.  There was some mild narrowing of the basilar segments to the left lower lobe presumably related to some extrinsic compression. Airway mucosa was mildly inflamed diffusely, more so on the left than the right.  There was a small amount of thick minimally purulent yellow secretions obstructing some basilar segments more so on the left than the right.  They were therapeutically lavaged from the right and left lower lobe sequentially with small aliquots of saline and collected in a Lukens Trap.  The bronchoscope was wedged the left lower lobe and 30 mL sterile saline x 3 infused and about 40 mL's of BAL fluid was aspirated back, yielding minimally purulent thick white secretions, which will be sent for appropriate cultures.  There is no immediate complication.

## 2018-01-04 NOTE — PROGRESS NOTES
Cardiology Progress Note               Author: Jerry Reyes Date & Time created: 2018  9:08 AM     Interval History:  Intermittently hypotensive needing pressors  BP elevated today  Net 1.1 L negative    Review of Systems   Unable to perform ROS: Intubated       Physical Exam   Constitutional: He appears well-developed and well-nourished. No distress.   HENT:   Head: Normocephalic.   Mouth/Throat: Oropharynx is clear and moist.   Eyes: EOM are normal. Pupils are equal, round, and reactive to light. Right eye exhibits no discharge. Left eye exhibits no discharge. No scleral icterus.   Neck: Normal range of motion. Neck supple. No JVD present. No tracheal deviation present.   Cardiovascular: Normal rate, regular rhythm, S1 normal, S2 normal, normal heart sounds, intact distal pulses and normal pulses.  Exam reveals no gallop, no S3, no S4 and no friction rub.    No murmur heard.   No systolic murmur is present    No diastolic murmur is present   Pulses:       Carotid pulses are 2+ on the right side, and 2+ on the left side.       Radial pulses are 2+ on the right side, and 2+ on the left side.        Dorsalis pedis pulses are 2+ on the right side, and 2+ on the left side.        Posterior tibial pulses are 2+ on the right side, and 2+ on the left side.   Pulmonary/Chest: Effort normal and breath sounds normal. No respiratory distress. He has no wheezes. He has no rales.   Abdominal: Soft. Bowel sounds are normal. He exhibits no distension and no mass. There is no tenderness. There is no rebound and no guarding.   Musculoskeletal: He exhibits no edema.   Neurological: He is alert. No cranial nerve deficit.   Skin: Skin is warm and dry. He is not diaphoretic. No pallor.   Nursing note and vitals reviewed.      Hemodynamics:  Temp (24hrs), Av.4 °C (101.1 °F), Min:38.1 °C (100.6 °F), Max:38.5 °C (101.3 °F)  Temperature: (!) 38.3 °C (100.9 °F)  Pulse  Av.3  Min: 0  Max: 221Heart Rate (Monitored):  97  NIBP: 109/59  CVP (mm Hg): (!) 228 MM HG  Respiratory:  Leo Vent Mode: APVCMV, Rate (breaths/min): 26, PEEP/CPAP: 10, FiO2: 40, P Peak (PIP): 26, P MEAN: 16 Respiration: (!) 23, Pulse Oximetry: 94 %     Work Of Breathing / Effort: Vented  RUL Breath Sounds: Clear, RML Breath Sounds: Diminished, RLL Breath Sounds: Diminished, GENNARO Breath Sounds: Crackles, LLL Breath Sounds: Diminished  Fluids:  Date 01/04/18 0700 - 01/05/18 0659   Shift 9737-4827 7232-5568 8103-4269 24 Hour Total   I  N  T  A  K  E   I.V. 100   100    Shift Total 100   100   O  U  T  P  U  T   Urine 800   800    Shift Total 800   800   Weight (kg) 152 152 152 152       Weight: (!) 152 kg (335 lb 1.6 oz)  GI/Nutrition:  No orders of the defined types were placed in this encounter.    Lab Results:  Recent Labs      01/02/18   0510  01/03/18   0615  01/04/18   0520   WBC  28.7*  27.8*  27.9*   RBC  3.72*  3.45*  3.39*   HEMOGLOBIN  8.8*  8.1*  8.0*   HEMATOCRIT  30.2*  28.5*  28.3*   MCV  81.2*  82.6  83.5   MCH  23.7*  23.5*  23.6*   MCHC  29.1*  28.4*  28.3*   RDW  60.6*  61.6*  63.5*   PLATELETCT  697*  659*  671*   MPV  10.2  10.5  10.3     Recent Labs      01/02/18   0510   01/03/18   0615   01/03/18   1730  01/04/18   0015  01/04/18   0520   SODIUM  139   --   142   --    --    --   143   POTASSIUM  3.8   < >  4.0   < >  3.6  3.8  4.0   CHLORIDE  101   --   103   --    --    --   106   CO2  31   --   33   --    --    --   32   GLUCOSE  175*   --   155*   --    --    --   181*   BUN  49*   --   54*   --    --    --   46*   CREATININE  1.18   --   1.34   --    --    --   1.17   CALCIUM  8.1*   --   8.0*   --    --    --   7.8*    < > = values in this interval not displayed.                 Recent Labs      01/02/18   0510   TRIGLYCERIDE  106         Medical Decision Making, by Problem:  Active Hospital Problems    Diagnosis   • CAD (coronary artery disease) [I25.10]   • CHF (congestive heart failure) (CMS-[facility]) [I50.9]       Plan:  53 yo M  with CAD s/p CABG x 2, unable to bypass RCA, CHFrEF from ICM, EF 25-30%.  We will continue sedation holidays and avoid propofol.  Cont to try to wean from levophed.     1. CAD s/p CABG    - cont asa    - cont rosuva to 40     2. CHFrEF, EF 25-30%    - cont dig 0.25 daily    - wean pressors     3. HLD    - per above     4. Hypotension, shock    - defer to primary team     5. WBC/Resp failure    - defer    Quality-Core Measures

## 2018-01-05 ENCOUNTER — APPOINTMENT (OUTPATIENT)
Dept: RADIOLOGY | Facility: MEDICAL CENTER | Age: 53
DRG: 003 | End: 2018-01-05
Attending: INTERNAL MEDICINE
Payer: MEDICARE

## 2018-01-05 LAB
ANION GAP SERPL CALC-SCNC: 7 MMOL/L (ref 0–11.9)
BACTERIA BRONCH AEROBE CULT: ABNORMAL
BACTERIA BRONCH AEROBE CULT: ABNORMAL
BACTERIA SPEC RESP CULT: ABNORMAL
BUN SERPL-MCNC: 47 MG/DL (ref 8–22)
CALCIUM SERPL-MCNC: 7.5 MG/DL (ref 8.5–10.5)
CHLORIDE SERPL-SCNC: 113 MMOL/L (ref 96–112)
CO2 SERPL-SCNC: 28 MMOL/L (ref 20–33)
CREAT SERPL-MCNC: 1.14 MG/DL (ref 0.5–1.4)
ERYTHROCYTE [DISTWIDTH] IN BLOOD BY AUTOMATED COUNT: 66.4 FL (ref 35.9–50)
GFR SERPL CREATININE-BSD FRML MDRD: >60 ML/MIN/1.73 M 2
GLUCOSE BLD-MCNC: 123 MG/DL (ref 65–99)
GLUCOSE BLD-MCNC: 126 MG/DL (ref 65–99)
GLUCOSE BLD-MCNC: 129 MG/DL (ref 65–99)
GLUCOSE BLD-MCNC: 95 MG/DL (ref 65–99)
GLUCOSE SERPL-MCNC: 129 MG/DL (ref 65–99)
GRAM STN SPEC: ABNORMAL
HCT VFR BLD AUTO: 25.6 % (ref 42–52)
HGB BLD-MCNC: 7 G/DL (ref 14–18)
MCH RBC QN AUTO: 23.5 PG (ref 27–33)
MCHC RBC AUTO-ENTMCNC: 27.3 G/DL (ref 33.7–35.3)
MCV RBC AUTO: 85.9 FL (ref 81.4–97.8)
PLATELET # BLD AUTO: 526 K/UL (ref 164–446)
PMV BLD AUTO: 11.1 FL (ref 9–12.9)
POTASSIUM SERPL-SCNC: 3.9 MMOL/L (ref 3.6–5.5)
POTASSIUM SERPL-SCNC: 4 MMOL/L (ref 3.6–5.5)
RBC # BLD AUTO: 2.98 M/UL (ref 4.7–6.1)
SIGNIFICANT IND 70042: ABNORMAL
SIGNIFICANT IND 70042: ABNORMAL
SITE SITE: ABNORMAL
SITE SITE: ABNORMAL
SODIUM SERPL-SCNC: 148 MMOL/L (ref 135–145)
SOURCE SOURCE: ABNORMAL
SOURCE SOURCE: ABNORMAL
TRIGL SERPL-MCNC: 93 MG/DL (ref 0–149)
WBC # BLD AUTO: 22.6 K/UL (ref 4.8–10.8)

## 2018-01-05 PROCEDURE — A9270 NON-COVERED ITEM OR SERVICE: HCPCS | Performed by: CLINICAL NURSE SPECIALIST

## 2018-01-05 PROCEDURE — 700111 HCHG RX REV CODE 636 W/ 250 OVERRIDE (IP): Performed by: CLINICAL NURSE SPECIALIST

## 2018-01-05 PROCEDURE — 80048 BASIC METABOLIC PNL TOTAL CA: CPT

## 2018-01-05 PROCEDURE — A9270 NON-COVERED ITEM OR SERVICE: HCPCS | Performed by: INTERNAL MEDICINE

## 2018-01-05 PROCEDURE — 700102 HCHG RX REV CODE 250 W/ 637 OVERRIDE(OP): Performed by: INTERNAL MEDICINE

## 2018-01-05 PROCEDURE — 82962 GLUCOSE BLOOD TEST: CPT | Mod: 91

## 2018-01-05 PROCEDURE — 700102 HCHG RX REV CODE 250 W/ 637 OVERRIDE(OP): Performed by: NURSE PRACTITIONER

## 2018-01-05 PROCEDURE — 700111 HCHG RX REV CODE 636 W/ 250 OVERRIDE (IP): Performed by: THORACIC SURGERY (CARDIOTHORACIC VASCULAR SURGERY)

## 2018-01-05 PROCEDURE — 94003 VENT MGMT INPAT SUBQ DAY: CPT

## 2018-01-05 PROCEDURE — 700102 HCHG RX REV CODE 250 W/ 637 OVERRIDE(OP): Performed by: CLINICAL NURSE SPECIALIST

## 2018-01-05 PROCEDURE — 700101 HCHG RX REV CODE 250: Performed by: INTERNAL MEDICINE

## 2018-01-05 PROCEDURE — 700111 HCHG RX REV CODE 636 W/ 250 OVERRIDE (IP): Performed by: INTERNAL MEDICINE

## 2018-01-05 PROCEDURE — 71045 X-RAY EXAM CHEST 1 VIEW: CPT

## 2018-01-05 PROCEDURE — 85027 COMPLETE CBC AUTOMATED: CPT

## 2018-01-05 PROCEDURE — A9270 NON-COVERED ITEM OR SERVICE: HCPCS | Performed by: NURSE PRACTITIONER

## 2018-01-05 PROCEDURE — 770022 HCHG ROOM/CARE - ICU (200)

## 2018-01-05 PROCEDURE — 84132 ASSAY OF SERUM POTASSIUM: CPT

## 2018-01-05 PROCEDURE — A9270 NON-COVERED ITEM OR SERVICE: HCPCS | Performed by: THORACIC SURGERY (CARDIOTHORACIC VASCULAR SURGERY)

## 2018-01-05 PROCEDURE — 84478 ASSAY OF TRIGLYCERIDES: CPT

## 2018-01-05 PROCEDURE — 700102 HCHG RX REV CODE 250 W/ 637 OVERRIDE(OP): Performed by: THORACIC SURGERY (CARDIOTHORACIC VASCULAR SURGERY)

## 2018-01-05 RX ORDER — POTASSIUM CHLORIDE 7.45 MG/ML
10 INJECTION INTRAVENOUS ONCE
Status: COMPLETED | OUTPATIENT
Start: 2018-01-05 | End: 2018-01-05

## 2018-01-05 RX ORDER — FUROSEMIDE 10 MG/ML
40 INJECTION INTRAMUSCULAR; INTRAVENOUS
Status: DISCONTINUED | OUTPATIENT
Start: 2018-01-05 | End: 2018-01-09

## 2018-01-05 RX ORDER — SPIRONOLACTONE 25 MG/1
12.5 TABLET ORAL
Status: DISCONTINUED | OUTPATIENT
Start: 2018-01-05 | End: 2018-01-06

## 2018-01-05 RX ORDER — CARVEDILOL 3.12 MG/1
3.12 TABLET ORAL 2 TIMES DAILY WITH MEALS
Status: DISCONTINUED | OUTPATIENT
Start: 2018-01-05 | End: 2018-01-06

## 2018-01-05 RX ADMIN — POTASSIUM BICARBONATE 50 MEQ: 25 TABLET, EFFERVESCENT ORAL at 11:20

## 2018-01-05 RX ADMIN — NYSTATIN 500000 UNITS: 100000 SUSPENSION ORAL at 17:19

## 2018-01-05 RX ADMIN — CEFAZOLIN SODIUM 2 G: 2 INJECTION, SOLUTION INTRAVENOUS at 05:23

## 2018-01-05 RX ADMIN — CEFTRIAXONE 2 G: 2 INJECTION, POWDER, FOR SOLUTION INTRAMUSCULAR; INTRAVENOUS at 11:20

## 2018-01-05 RX ADMIN — OXYCODONE HYDROCHLORIDE 10 MG: 10 TABLET ORAL at 14:16

## 2018-01-05 RX ADMIN — FAMOTIDINE 20 MG: 20 TABLET, FILM COATED ORAL at 20:26

## 2018-01-05 RX ADMIN — POTASSIUM CHLORIDE 10 MEQ: 7.46 INJECTION, SOLUTION INTRAVENOUS at 07:43

## 2018-01-05 RX ADMIN — POTASSIUM BICARBONATE 50 MEQ: 25 TABLET, EFFERVESCENT ORAL at 17:21

## 2018-01-05 RX ADMIN — POTASSIUM BICARBONATE 50 MEQ: 25 TABLET, EFFERVESCENT ORAL at 04:55

## 2018-01-05 RX ADMIN — DIGOXIN 250 MCG: 125 TABLET ORAL at 17:21

## 2018-01-05 RX ADMIN — FUROSEMIDE 80 MG: 10 INJECTION, SOLUTION INTRAMUSCULAR; INTRAVENOUS at 04:55

## 2018-01-05 RX ADMIN — ENOXAPARIN SODIUM 150 MG: 150 INJECTION SUBCUTANEOUS at 08:23

## 2018-01-05 RX ADMIN — ALPRAZOLAM 0.5 MG: 0.5 TABLET ORAL at 17:22

## 2018-01-05 RX ADMIN — DEXMEDETOMIDINE HYDROCHLORIDE 0.5 MCG/KG/HR: 4 INJECTION, SOLUTION INTRAVENOUS at 21:58

## 2018-01-05 RX ADMIN — ALPRAZOLAM 0.5 MG: 0.5 TABLET ORAL at 08:24

## 2018-01-05 RX ADMIN — FUROSEMIDE 40 MG: 10 INJECTION, SOLUTION INTRAMUSCULAR; INTRAVENOUS at 17:16

## 2018-01-05 RX ADMIN — CHLORHEXIDINE GLUCONATE 15 ML: 1.2 RINSE ORAL at 08:23

## 2018-01-05 RX ADMIN — SPIRONOLACTONE 12.5 MG: 25 TABLET, FILM COATED ORAL at 08:23

## 2018-01-05 RX ADMIN — NYSTATIN 500000 UNITS: 100000 SUSPENSION ORAL at 08:22

## 2018-01-05 RX ADMIN — FAMOTIDINE 20 MG: 20 TABLET, FILM COATED ORAL at 08:23

## 2018-01-05 RX ADMIN — INSULIN HUMAN 5 UNITS: 100 INJECTION, SUSPENSION SUBCUTANEOUS at 14:36

## 2018-01-05 RX ADMIN — AMIODARONE HYDROCHLORIDE 400 MG: 200 TABLET ORAL at 08:23

## 2018-01-05 RX ADMIN — ALPRAZOLAM 0.5 MG: 0.5 TABLET ORAL at 01:01

## 2018-01-05 RX ADMIN — ENOXAPARIN SODIUM 150 MG: 150 INJECTION SUBCUTANEOUS at 20:29

## 2018-01-05 RX ADMIN — ROSUVASTATIN CALCIUM 40 MG: 10 TABLET, FILM COATED ORAL at 20:25

## 2018-01-05 RX ADMIN — MORPHINE SULFATE 4 MG: 4 INJECTION INTRAVENOUS at 22:00

## 2018-01-05 RX ADMIN — NYSTATIN 500000 UNITS: 100000 SUSPENSION ORAL at 20:26

## 2018-01-05 RX ADMIN — CARVEDILOL 3.12 MG: 3.12 TABLET, FILM COATED ORAL at 11:20

## 2018-01-05 RX ADMIN — POTASSIUM CHLORIDE 10 MEQ: 7.46 INJECTION, SOLUTION INTRAVENOUS at 01:18

## 2018-01-05 RX ADMIN — INSULIN HUMAN 5 UNITS: 100 INJECTION, SUSPENSION SUBCUTANEOUS at 20:31

## 2018-01-05 RX ADMIN — CARVEDILOL 3.12 MG: 3.12 TABLET, FILM COATED ORAL at 17:21

## 2018-01-05 RX ADMIN — OXYCODONE HYDROCHLORIDE 5 MG: 10 TABLET ORAL at 17:22

## 2018-01-05 RX ADMIN — CHLORHEXIDINE GLUCONATE 15 ML: 1.2 RINSE ORAL at 20:26

## 2018-01-05 RX ADMIN — NYSTATIN 500000 UNITS: 100000 SUSPENSION ORAL at 14:16

## 2018-01-05 RX ADMIN — DEXMEDETOMIDINE HYDROCHLORIDE 0.5 MCG/KG/HR: 4 INJECTION, SOLUTION INTRAVENOUS at 00:59

## 2018-01-05 RX ADMIN — ASPIRIN 81 MG: 81 TABLET, CHEWABLE ORAL at 08:23

## 2018-01-05 RX ADMIN — AMIODARONE HYDROCHLORIDE 400 MG: 200 TABLET ORAL at 20:26

## 2018-01-05 ASSESSMENT — LIFESTYLE VARIABLES: REASON UNABLE TO ASSESS: INTUBATED/TRACHED

## 2018-01-05 NOTE — PROGRESS NOTES
Pulmonary Critical Care Progress Note      DOS:  1/5/18, admit 12/19/2017    Chief Complaint: CAD, 2V CABG    History of Present Illness: 51 y/o M, h/o DM, COPD, DLD, CAD, SHASHANK, Obesity; underwent 2v CABG 12/22    Interval Events:  24 hour interval history reviewed   Reason for visit:  A&C respiratory failure, CAD s/p CABG, AF/flut, Low K/Mg, S/p trach 12/31    AFib/flut - 70-80s  Off DEX  Xanax  CPAP trials ongoing, intermittently has high R SBI and has to be switched back to full support  Klebsiella oxytoca that is resistant only to ampicillin is growing in BAL  Ancef 10/10  Tm 38.6  WBC improving down from 27.9-22.6  Change to C3 - 7 days     YESTERDAY  S/p thoracentesis for > 1000cc yesteday  Tx/Dx FOB yesterday - BAL sent, some plugs LLL cleared  Follows  Confused/impulsive  DEX 0.6  Xanax pewr [facility]  EPI 0.04  AF 70-100s  Labile Bp  NE stopped yesterday  TF goal  Great UO  I/Os neg 1160/24 hrs  Tm 38.5 - WBC still up  SBT failed - RSBI > 120 few minutes  Full dose Lovenox  Ancef 9/10  Micro GNR in BAL?  Pleural neg stain       YESTERDAY  Mildly confused - follows  Amio  Back in AF 120s  KRISTIN off  EPI 0.03  DEX 0.7  FENT 50 -> off now  EOB  L thora pending  Lovensox on hold  PEEP 8 and fiO2 increased to 0.5  Lots secretions  Ancef  8/10    Serial follow-up evaluations performed  Thoracentesis attempted by radiology, unsuccessful  Interventional radiologist requested to perform thoracentesis and successfully performed late afternoon with over 1000 cc removed, studies pending    Low-grade fevers throughout the day, worsening oxygenation requiring PEEP to be increased back from 8-10 words been the last several days. Perhaps increased secretions from tracheostomy tube.  Reviewed with family at bedside, diagnostic and therapeutic bronchoscopy to be performed    Hemodynamics remain okay, blood pressure a little soft/slightly low at times, may be related to blood pressure cuff       YESTERDAY  Sedate on vent - follows  some  Trach ok  SBT 1 hr RSBI 59  SBT 1 hr x2 yesterday  CXR better  - less eff/atx on R - L effusion worse?  DEX 0.8  Fent 100  Afib/flutter - rate 70-80s  CVP 7-12  Lasix drip -> Lasix 80 BID IV  Amio to PO  KRISTIN 10  EPI 0.05  Tm 38.3  TF goal   UO adequate - I/Os neg 856  Full dose lovenox - last dose this AM  Ancef 7/10    D/w family at bedside       YESTERDAY  Sedate on vent  Follows some - less agitated  AF/flut - HR 80s  Bp ok on pressors  Tm 37.9  Trach not bleeding  KRISTIN 50  EPI 0.03  Amio 0.5  Fent 100  Prop 20  Lasix 10/hr drip  Full dose Lovenox  Ancef 6/10  K 3.5  Check Mg  CXR wet/BSSLLA - no change vs old film  Therapies    YESTERDAY   - AF/flutter, , amio .5   - epi, kristin gtts   - lasix gtt   - I/O 4.7/6.5   - vent day 9, min secretions   - cxr unchagned   - CTs out     - Lovenox 150 bid, held for trach    - day 5 of 10 Ancef for MSSA, kleb BAL   - Cr up 1.28   - perc trach today  Yesterday   - POD8   - arouses, follows, restless, prop 60   - AF/flutter, 120's   - amiodarone, gtt .5   - epi gtt, kristin gtt   - lasix gtt 10/hr   - williams TF at 80   - good UOP, I/O 5.5/7.8   - Tm 38.3   - full dose lovenox   - RUE DVT   - vent day 9, 8, 40%; no SBT, cxr edema, atx unchanged   - ancef day 2 for kleb/mssa pna   - will need trach  :  Review of Systems   Unable to perform ROS: Acuity of condition       Physical Exam   Constitutional: He appears well-developed and well-nourished.   Remains confused but follows, since of humor may be returning   HENT:   Head: Normocephalic and atraumatic.   Eyes: Pupils are equal, round, and reactive to light. No scleral icterus.   Neck: No JVD present. No tracheal deviation present.   Cardiovascular: Normal rate and intact distal pulses.  Exam reveals no gallop and no friction rub.    No murmur heard.  Pulmonary/Chest: No respiratory distress. He has rales.   Diminished at bases improved after both thoracentesis and therapeutic bronchoscopy, scattered coarse crackles, no  wheezes - better on R, trach site okay, secretions from trach with suctioning.   Abdominal: Soft. He exhibits no distension. There is no tenderness. There is no rebound and no guarding.   Musculoskeletal: He exhibits edema. He exhibits no deformity.   Trace edema   Neurological: No cranial nerve deficit.   following some commands intermittently, moves all extremities, still weak diffusely   Skin: Skin is warm and dry.   Psychiatric:   Sedate, mood better   Nursing note and vitals reviewed.        PFSH:  No change.    Respiratory:  Leo Vent Mode: APVCMV, Rate (breaths/min): 26, Vt Target (mL): 540, PEEP/CPAP: 10, FiO2: 40, Static Compliance (ml / cm H2O): 43, Control VTE (exp VT): 540  Pulse Oximetry: 96 %    Ventilator mechanics and waveforms are reviewed at the bedside with RT again                    Invalid input(s): [id number]    HemoDynamics:  Pulse: 64, Heart Rate (Monitored): 65  NIBP: (!) 99/52  CVP (mm Hg): (!) 10 MM HG            Neuro:  GCS Total [place] Coma Score: 11         Fluids:  Intake/Output       01/03/18 0700 - 01/04/18 0659 01/04/18 0700 - 01/05/18 0659 01/05/18 0700 - 01/06/18 0659      7689-4473 3847-2602 Total 6119-0975 1002-0394 Total 6535-6547 6371-8564 Total       Intake    I.V.  1164  401.2 1565.3  1585.7  405.5 1991.2  --  -- --    Precedex Volume 360.3 37.8 398.1 591.9 155.5 747.4 -- -- --    Epinephrine Volume 233.7 53.4 287.2 463.8 -- 463.8 -- -- --    IV Piggyback Volume 450 200 650 450 250 700 -- -- --    NS  110 230 80 -- 80 -- -- --    Other  250  -- 250  360  -- 360  --  -- --    Medications (P.O./ Enteral Liquids) 250 -- 250 360 -- 360 -- -- --    Enteral  390  850 1240  1380  850 2230  --  -- --    Enteral Volume  2587 412 5710 -- -- --    Free Water / Tube Flush 50 -- 50 360 -- 360 -- -- --    Total Intake 1804 1251.2 3055.3 3325.7 1255.5 4581.2 -- -- --       Output    Urine  2115 2100 4215  3200  1205 4405  --  -- --    Indwelling Cathether 2115  2100 4215 3200 1205 4405 -- -- --    Stool  --  -- --  --  -- --  --  -- --    Number of Times Stooled -- -- -- 2 x -- 2 x -- -- --    Total Output 2115 2100 4215 3200 1205 4405 -- -- --       Net I/O     -311 -848.8 -1159.7 125.7 50.5 176.2 -- -- --          Recent Labs      18   0520  18   1225  18   1745  18   0008   SODIUM  142   --   143   --    --    --    POTASSIUM  4.0   < >  4.0  3.6  4.0  3.9   CHLORIDE  103   --   106   --    --    --    CO2  33   --   32   --    --    --    BUN  54*   --   46*   --    --    --    CREATININE  1.34   --   1.17   --    --    --    CALCIUM  8.0*   --   7.8*   --    --    --     < > = values in this interval not displayed.       GI/Nutrition:  TF goal  Liver Function  Recent Labs      18   0615  18   0520   GLUCOSE  155*  181*       Heme:  Recent Labs      18   0520   RBC  3.45*  3.39*   HEMOGLOBIN  8.1*  8.0*   HEMATOCRIT  28.5*  28.3*   PLATELETCT  659*  671*       Infectious Disease:  Temp  Av.3 °C (101 °F)  Min: 37.8 °C (100 °F)  Max: 38.6 °C (101.5 °F)  Micro: reviewed  Recent Labs      18   0615  18   1545  18   0520   WBC  27.8*   --   27.9*   EOSINOPHILS   --   1   --      Current Facility-Administered Medications   Medication Dose Frequency Provider Last Rate Last Dose   • alprazolam (XANAX) tablet 0.5 mg  0.5 mg TID PRN Monet Antonio, A.P.N.   0.5 mg at 18 0101   • nystatin (MYCOSTATIN) 615698 UNIT/ML suspension 500,000 Units  5 mL 4X/DAY Silvio Eller M.D.   500,000 Units at 18   • enoxaparin (LOVENOX) injection 150 mg  150 mg Q12HRS Kali Evans Jr., D.O.   150 mg at 18   • furosemide (LASIX) injection 80 mg  80 mg BID DIURETIC Jerry Reyes M.D.   80 mg at 18 0455   • rosuvastatin (CRESTOR) tablet 40 mg  40 mg Q EVENING Jerry Reyes M.D.   40 mg at 18   • digoxin (LANOXIN) tablet 250 mcg  250  mcg DAILY AT 1800 Jerry Reyes M.D.   250 mcg at 01/04/18 1744   • dexmedetomidine (PRECEDEX) 400 mcg/100mL premix infusion  0.1-1.5 mcg/kg/hr Continuous Jerry Reyes M.D. 11.3 mL/hr at 01/05/18 0415 0.3 mcg/kg/hr at 01/05/18 0415   • amiodarone (CORDARONE) tablet 400 mg  400 mg TWICE DAILY Gui Delcid M.D.   400 mg at 01/04/18 2142   • docusate sodium 100mg/10mL (COLACE) solution 100 mg  100 mg DAILY Silvia Preston A.P.N.   100 mg at 01/04/18 0909    Or   • docusate sodium (COLACE) capsule 100 mg  100 mg DAILY Silvia Preston A.P.N.   100 mg at 01/02/18 0831   • ceFAZolin (ANCEF) IVPB 2 g  2 g Q8HRS Amrik Banegas M.D.   2 g at 01/05/18 0523   • potassium bicarbonate (KLYTE) 25 MEQ effervescent tablet TBEF 50 mEq  50 mEq Q6HRS Amrik Banegas M.D.   50 mEq at 01/05/18 0455   • aspirin (ASA) chewable tab 81 mg  81 mg DAILY Kiel Ash M.D.   81 mg at 01/04/18 0904   • tetrahydrozoline (VISINE) 0.05 % ophthalmic solution 1 Drop  1 Drop 4X/DAY PRN Amrik Banegas M.D.       • K+ Scale: Goal of 4.5  1 Each Q6HRS Radha Martinez A.P.N.   1 Each at 01/05/18 0000   • insulin NPH (HUMULIN,NOVOLIN) injection 5 Units  5 Units Q8HRS Silvia Preston A.P.N.   Stopped at 01/05/18 0600   • phenylephrine (KRISTIN-SYNEPHRINE) 80,000 mcg in  mL Infusion  0-50 mcg/min Continuous Radha Martinez A.P.N.   Stopped at 01/02/18 2200   • famotidine (PEPCID) tablet 20 mg  20 mg BID Rodolfo Landa M.D.   20 mg at 01/04/18 2143   • chlorhexidine (PERIDEX) 0.12 % solution 15 mL  15 mL BID Rodolfo Landa M.D.   15 mL at 01/04/18 2143   • fentaNYL (SUBLIMAZE) 50 mcg/mL in 50mL (Continuous Infusion)   Continuous Rodolfo Landa M.D.   Stopped at 01/03/18 1045   • insulin lispro (HUMALOG) injection 0-15 Units  0-15 Units Q6HRS Radha Martinez, A.P.N.   Stopped at 01/05/18 0600   • Respiratory Care per Protocol   Continuous RT Silvia Preston, A.P.N.       • NS infusion   Continuous Silvia Preston, A.P.N. 10 mL/hr at  01/04/18 0700     • Pharmacy Consult Request ...Pain Management Review 1 Each  1 Each PRN Silvia Preston, A.P.N.       • senna-docusate (PERICOLACE or SENOKOT S) 8.6-50 MG per tablet 1 Tab  1 Tab Nightly Silvia Preston A.P.N.   Stopped at 01/04/18 2100    And   • senna-docusate (PERICOLACE or SENOKOT S) 8.6-50 MG per tablet 1 Tab  1 Tab Q24HRS PRN Silvia Preston A.P.N.   1 Tab at 12/28/17 0732    And   • lactulose 20 GM/30ML solution 30 mL  30 mL Q24HRS PRN Silvia Preston A.P.N.   30 mL at 01/03/18 0912    And   • bisacodyl (DULCOLAX) suppository 10 mg  10 mg Q24HRS PRN Silvia Preston A.P.N.   10 mg at 01/03/18 0912    And   • fleet enema 133 mL  1 Each Once PRN Silvia Preston, A.P.N.       • electrolyte-A (PLASMALYTE-A) infusion   PRN Silvia Preston, A.P.N.       • oxycodone immediate release (ROXICODONE) tablet 5 mg  5 mg Q3HRS PRN Silvia Preston, A.P.N.       • oxycodone immediate release (ROXICODONE) tablet 10 mg  10 mg Q3HRS PRN Silvia Preston, A.P.N.   10 mg at 01/04/18 1442   • morphine (pf) 4 mg/ml injection 4 mg  4 mg Q3HRS PRN Silvia Preston, A.P.N.   4 mg at 01/03/18 1743   • tramadol (ULTRAM) 50 MG tablet 50 mg  50 mg Q4HRS PRN Silvia Preston, A.P.N.       • morphine (pf) 4 mg/ml injection 4 mg  4 mg Q HOUR PRN Silvia Preston, A.P.N.   4 mg at 12/23/17 0239   • ondansetron (ZOFRAN) syringe/vial injection 4 mg  4 mg Q6HRS PRN Silvia Preston A.P.N.        Or   • prochlorperazine (COMPAZINE) injection 10 mg  10 mg Q6HRS PRN Silvia Preston, A.P.N.        Or   • promethazine (PHENERGAN) suppository 25 mg  25 mg Q6HRS PRN Silvia Preston, A.P.N.       • acetaminophen (TYLENOL) tablet 650 mg  650 mg Q4HRS PRN Silvia Preston, A.P.N.   650 mg at 01/01/18 1840    Or   • acetaminophen (TYLENOL) suppository 650 mg  650 mg Q4HRS PRN Silvia Preston, A.P.N.       • mag hydrox-al hydrox-simeth (MAALOX PLUS ES or MYLANTA DS) suspension 30 mL  30 mL Q4HRS PRN Silvia Preston, A.P.N.   30 mL at 01/01/18 3222   •  diphenhydrAMINE (BENADRYL) tablet/capsule 25 mg  25 mg HS PRN - MR X 1 Silvia Preston AAlexPAlexNAlex   25 mg at 01/02/18 0013   • epinephrine 1 mg/mL(1:1000) 8 mg in  mL Infusion  0-0.2 mcg/kg/min Continuous Kiel Ash M.D.   Stopped at 01/04/18 1458   • albuterol inhaler 2 Puff  2 Puff Q4H PRN (RT) Kiel Ash M.D.       • ipratropium-albuterol (DUONEB) nebulizer solution 3 mL  3 mL Q4H PRN (RT) Kiel Ash M.D.   3 mL at 12/23/17 1154     Last reviewed on 12/22/2017  7:07 AM by Katie Aly R.N.    Quality  Measures:  Labs reviewed, Medications reviewed and Radiology images reviewed                      Assessment/Plan:  Status post 2-vessel coronary artery bypass grafting on 12/22/2017.   - Status post IABP    - EPI drip weaned off Am 1/4   - chest tubes out   - off Lasix drip, monitor volume status and hemodynamics closely   - Postoperative brief cardiac arrest, now awake and following/sedated  Acute hypoxic respiratory failure secondary to above.   - intubated 12/22   - Continue full ventilator support today, SBTs as c linically appropriate   - s/p trach 12/31   - serial ABG/CXR and vent mechanics review   - LTACH eval   - sedation vacations - try to swap Prop to DEX - lighten sedation   - Mobilize when safe as tolerated  ABN CXR - bilateral effusion/atx    - L effuison worse - L thoracentesis - hold dose Lovenox - tap in AM by IR   - monitor for need for EP Tx FOB after thoracentesis   - BAL 1/3 fluid growing Klebsiella oxytoca, will treat with 7 days ceftriaxone - stopped 1/12  AF/RVR   - Amiodarone   - Digoxin   - Anti-coag   - ERP keep K > 4 and Mg > 2  Coronary artery disease with multivessel disease - ASA/full antiocoag  Pneumonia   - MSSA and Klebsiella identified   - Antibiotics de-escalated to Ancef, continue ×10 days - stop 1/5  Cardiomyopathy EF 35% and global systolic dysfunction - ionotropes/forced diuresis as needed  Severely dilated right ventricle - caution with volume  excess  Chronic obstructive pulmonary disease - RT protocosl  Reported diabetes.   - ssi/NPH and q6 FS  Dyslipidemia - statin  Clinically with obstructive sleep apnea - slow removal of trach - PSG later?  Prophylaxis  LTACH consult pending - not ready yet.    Discussed patient condition and risk of morbidity and/or mortality with Family, RN, RT, Pharmacy, Dietary, , Charge nurse / hot rounds, CVS and U/s radiologist and an interventional radiologist.

## 2018-01-05 NOTE — CARE PLAN
Problem: Psychosocial Needs:  Goal: Level of anxiety will decrease    Intervention: Identify and develop with patient strategies to cope with anxiety triggers  Assess patient's anxiety triggers; encourage pt to verbalize feelings regarding care, hospital stay, and illness. Maintain calm environment; keep lights dim, noise level low, door/curtain closed. Medicate PRN per MAR. Hourly round.      Problem: Mobility  Goal: Risk for activity intolerance will decrease    Intervention: Encourage patient to increase activity level in collaboration with Interdisciplinary Team  Use cardiac chair to mobilize patient; encourage active range of motion.

## 2018-01-05 NOTE — CARE PLAN
Problem: Post Op Day 4 CABG/Heart Valve Replacement  Goal: Optimal care of the Post Op CABG/Heart Valve replacement Post Op Day 4    Intervention: Daily Weights  Done.   Intervention: Shower daily and clean incisions twice daily with soap and water  Done.   Intervention: Up in chair for all meals  Unable to mobilize.   Intervention: IS q 1 hour while awake and record best IS volume  Trached/intubated.

## 2018-01-05 NOTE — DISCHARGE PLANNING
Medical SW    Referral: Sw discussed d/c plan and pending medical clearance w/ MD and surgery APN.     Intervention: Pt will most likely d/c Sunday or Monday.     Obey called Ioana at [facility] and left VM updating as above requesting information regarding if pt is accepted via INS AUTH.    Ioana called w/[facility]. She requests this Sw place her contact information in the EPIC note. Her number is [phone]. She would like weekend SW to call her and set up transport once pt is medically cleared. They have officially accepted pt for admit.    Obey left weekend report for Sw f/u.    Plan: Sw to assist w/ d/c planning as needed.

## 2018-01-05 NOTE — CARE PLAN
Problem: Ventilation Defect:  Goal: Ability to achieve and maintain unassisted ventilation or tolerate decreased levels of ventilator support  Adult Ventilation Update    Total Vent Days: 14  Trach Days: 5    Patient Lines/Drains/Airways Status    Active Airway     Name: Placement date: Placement time: Site: Days:    Airway Group Standard Cuffed Trach Tracheostomy 8.0 12/31/17   1106   Tracheostomy   4              APVcmv 26, 540, +10, 40%    Events: pt tried on SPONT this AM but RSBI went into 120s instantly, pt currently on SPONT 8/10 and tolerating well to this point for 3 hours 20 minutes, ok to SPONT up to 4 hours per Dr. Eller

## 2018-01-05 NOTE — CARE PLAN
Problem: Ventilation Defect:  Goal: Ability to achieve and maintain unassisted ventilation or tolerate decreased levels of ventilator support    Intervention: Support and monitor invasive and noninvasive mechanical ventilation  Adult Ventilation Update    Total Vent Days: 15    Patient Lines/Drains/Airways Status    Active Airway     Name: Placement date: Placement time: Site: Days:    Airway Group Standard Cuffed Trach Tracheostomy 8.0 12/31/17   1106   Tracheostomy   6              APVCMV  RR 26  Vt 540  PEEP 10  FiO2 40%

## 2018-01-05 NOTE — CARE PLAN
Problem: Ventilation Defect:  Goal: Ability to achieve and maintain unassisted ventilation or tolerate decreased levels of ventilator support  Outcome: PROGRESSING AS EXPECTED  Adult Ventilation Update    Total Vent Days: 15, TD 6    Patient Lines/Drains/Airways Status    Active Airway     Name: Placement date: Placement time: Site: Days:    Airway Group Standard Cuffed Trach Tracheostomy 8.0 12/31/17   1106   Tracheostomy   5            APVcmv  26  /  540  /  +10  /  40%    In the last 24 hours, the patient tolerated SBT for 8 hours on settings of 8 over 10.    #FVC / Vital Capacity (liters) : 1.1 (01/02/18 0802)  NIF (cm H2O) : -23 (01/02/18 0802)  Rapid Shallow Breathing Index (RR/VT): 59 (01/02/18 0802)  Plateau Pressure (Q Shift): 23 (01/05/18 0630)  Static Compliance (ml / cm H2O): 61 (01/05/18 1353)    Patient failed trials because of Barriers to Wean: Other (Comments) (hemodynamically unstable) (01/01/18 1257)  Barriers to SBT Weaning Trial Stopped due to:: RSBI >105 (Rapid Shallow Breathing Index) (01/04/18 0628)  Length of Weaning Trial Length of Weaning Trial (Hours): 0 (01/04/18 0628)    Sputum/Suction  Cough: Productive (01/05/18 1349)  Sputum Amount: Small (01/05/18 1349)  Sputum Color: White;Clear (01/05/18 1349)  Sputum Consistency: Thick;Thin (01/05/18 1349)    Mobility Group  Activity Performed: Edge of bed (01/05/18 1400)  Time Activity Tolerated: 10 min (01/05/18 1400)  Distance Per Occurrence (ft.): 200 feet (12/21/17 2030)  # of Times Distance was Traveled: 2 (12/21/17 2030)  Assistance / Tolerance: Assistance of Two or More;Tolerates Well (01/05/18 1400)  Pt Calls for Assistance: No (01/05/18 1400)  Staff Present for Mobilization: RN;RT (01/05/18 1400)  Gait: Steady (12/21/17 2030)  Assistive Devices: Slide board (01/04/18 1000)  Reason Not Mobilized: Other (comment) (will reassess with day RN) (01/05/18 0630)  Mobilization Comments: Pt unable to participate (01/01/18  0800)    Events/Summary/Plan: SBT, RN aware (01/05/18 9533)

## 2018-01-05 NOTE — PROGRESS NOTES
Cardiology Progress Note               Author: Jerry Reyes Date & Time created: 2018  7:38 AM     Interval History:  Off pressors        Chief Complaint:  CHFrEF  CAD s/p CABG  Hypoxic resp failure    Review of Systems   Unable to perform ROS: Intubated       Physical Exam   Constitutional: He appears well-developed and well-nourished. No distress.   HENT:   Head: Normocephalic.   Mouth/Throat: Oropharynx is clear and moist.   Eyes: EOM are normal. Pupils are equal, round, and reactive to light. Right eye exhibits no discharge. Left eye exhibits no discharge. No scleral icterus.   Neck: Normal range of motion. Neck supple. No JVD present. No tracheal deviation present.   Cardiovascular: Normal rate, regular rhythm, S1 normal, S2 normal, normal heart sounds, intact distal pulses and normal pulses.  Exam reveals no gallop, no S3, no S4 and no friction rub.    No murmur heard.   No systolic murmur is present    No diastolic murmur is present   Pulses:       Carotid pulses are 2+ on the right side, and 2+ on the left side.       Radial pulses are 2+ on the right side, and 2+ on the left side.        Dorsalis pedis pulses are 2+ on the right side, and 2+ on the left side.        Posterior tibial pulses are 2+ on the right side, and 2+ on the left side.   Pulmonary/Chest: Effort normal and breath sounds normal. No respiratory distress. He has no wheezes. He has no rales.   Abdominal: Soft. Bowel sounds are normal. He exhibits no distension and no mass. There is no tenderness. There is no rebound and no guarding.   Musculoskeletal: He exhibits no edema.   Neurological: He is alert. No cranial nerve deficit.   Skin: Skin is warm and dry. He is not diaphoretic. No pallor.   Psychiatric: He has a normal mood and affect. His behavior is normal. Judgment and thought content normal.   Nursing note and vitals reviewed.      Hemodynamics:  Temp (24hrs), Av.3 °C (101 °F), Min:37.8 °C (100 °F), Max:38.6 °C (101.5  °F)  Temperature: 37.8 °C (100 °F)  Pulse  Av.7  Min: 0  Max: 221Heart Rate (Monitored): 86  NIBP: 110/64  CVP (mm Hg): (!) 10 MM HG  Respiratory:  Leo Vent Mode: Spont, Rate (breaths/min): 26, PEEP/CPAP: 10, FiO2: 40, P Peak (PIP): 35, P MEAN: 18 Respiration: (!) 27, Pulse Oximetry: 98 %     Work Of Breathing / Effort: Vented  RUL Breath Sounds: Diminished;Clear, RML Breath Sounds: Diminished, RLL Breath Sounds: Diminished, GENNARO Breath Sounds: Clear;Diminished, LLL Breath Sounds: Diminished  Fluids:     Weight: (!) 151 kg (332 lb 14.3 oz)  GI/Nutrition:  No orders of the defined types were placed in this encounter.    Lab Results:  Recent Labs      18   0615  18   0520  18   0510   WBC  27.8*  27.9*  22.6*   RBC  3.45*  3.39*  2.98*   HEMOGLOBIN  8.1*  8.0*  7.0*   HEMATOCRIT  28.5*  28.3*  25.6*   MCV  82.6  83.5  85.9   MCH  23.5*  23.6*  23.5*   MCHC  28.4*  28.3*  27.3*   RDW  61.6*  63.5*  66.4*   PLATELETCT  659*  671*  526*   MPV  10.5  10.3  11.1     Recent Labs      18   0615   18   0520   18   1745  18   0008  18   0510   SODIUM  142   --   143   --    --    --   148*   POTASSIUM  4.0   < >  4.0   < >  4.0  3.9  4.0   CHLORIDE  103   --   106   --    --    --   113*   CO2  33   --   32   --    --    --   28   GLUCOSE  155*   --   181*   --    --    --   129*   BUN  54*   --   46*   --    --    --   47*   CREATININE  1.34   --   1.17   --    --    --   1.14   CALCIUM  8.0*   --   7.8*   --    --    --   7.5*    < > = values in this interval not displayed.                 Recent Labs      18   0510   TRIGLYCERIDE  93         Medical Decision Making, by Problem:  Active Hospital Problems    Diagnosis   • CAD (coronary artery disease) [I25.10]   • CHF (congestive heart failure) (CMS-[facility]) [I50.9]       Plan:  51 yo M with CAD s/p CABG x 2, unable to bypass RCA, CHFrEF from ICM, EF 25-30%.  Now off pressors and sedation.  Beta blockers should be  held for at least 2 weeks given his recent Beta agonists.     1. CAD s/p CABG    - cont asa    - cont rosuva to 40     2. CHFrEF, EF 25-30%    - cont dig 0.25 daily    - hold beta blockers for 2 weeks    - reduce lasix to 40 IV BID    - start spironolactone 12.5 daily, titrate to 50 mg daily    - consider ACE in 24 hours as BP tolerates    3. HLD    - per above     4. Hypotension, shock    -resolving     5. WBC/Resp failure    - defer    Quality-Core Measures

## 2018-01-05 NOTE — PROGRESS NOTES
Cardiovascular Surgery Progress Note    Name: Joshua Medrano  MRN: [id number]  : 1965  Admit Date: 2017 10:21 AM  Procedure:  Procedure(s) and Anesthesia Type:     * MULTIPLE CORONARY ARTERY BYPASS ENDO VEIN HARVEST X2 - General     * JIM - General  14 Day Post-Op    Vitals:  Patient Vitals for the past 8 hrs:   Temp SpO2 O2 Delivery Pulse Heart Rate (Monitored) Resp NIBP Weight   18 0635 - 98 % - - 86 - - -   18 0630 - 99 % - - 86 - - -   18 0600 - 100 % Ventilator 70 68 (!) 27 110/64 (!) 151 kg (332 lb 14.3 oz)   18 0500 - 98 % - 64 64 (!) 26 (!) 99/54 -   18 0400 37.8 °C (100 °F) 96 % Ventilator 64 65 (!) 26 (!) 99/52 -   18 0300 - 98 % - 70 72 (!) 21 (!) 98/56 -   18 0209 - 98 % - - 69 - - -   18 0200 - 95 % Ventilator 69 72 (!) 26 (!) 97/52 -   18 0100 - 100 % - 69 69 (!) 26 110/68 -     Temp (24hrs), Av.3 °C (100.9 °F), Min:37.8 °C (100 °F), Max:38.6 °C (101.5 °F)      Respiratory:  Leo Vent Mode: Spont, Rate (breaths/min): 26, PEEP/CPAP: 10, FiO2: 40, P Peak (PIP): 35, P MEAN: 18 Respiration: (!) 27, Pulse Oximetry: 98 %     Chest Tube Drains:          Fluids:    Intake/Output Summary (Last 24 hours) at 18 0852  Last data filed at 18 0800   Gross per 24 hour   Intake          3916.35 ml   Output             4855 ml   Net          -938.65 ml     Admit weight: Weight: (!) 159.2 kg (350 lb 15.6 oz)  Current weight: Weight: (!) 151 kg (332 lb 14.3 oz) (18 0600)    Labs:  Recent Labs      18   0615  18   0520  18   0510   WBC  27.8*  27.9*  22.6*   RBC  3.45*  3.39*  2.98*   HEMOGLOBIN  8.1*  8.0*  7.0*   HEMATOCRIT  28.5*  28.3*  25.6*   MCV  82.6  83.5  85.9   MCH  23.5*  23.6*  23.5*   MCHC  28.4*  28.3*  27.3*   RDW  61.6*  63.5*  66.4*   PLATELETCT  659*  671*  526*   MPV  10.5  10.3  11.1     Recent Labs      18   1545   EOSINOPHILS  1     Recent Labs      1815   18   0520    01/04/18   1745  01/05/18   0008  01/05/18   0510   SODIUM  142   --   143   --    --    --   148*   POTASSIUM  4.0   < >  4.0   < >  4.0  3.9  4.0   CHLORIDE  103   --   106   --    --    --   113*   CO2  33   --   32   --    --    --   28   GLUCOSE  155*   --   181*   --    --    --   129*   BUN  54*   --   46*   --    --    --   47*   CREATININE  1.34   --   1.17   --    --    --   1.14   CALCIUM  8.0*   --   7.8*   --    --    --   7.5*    < > = values in this interval not displayed.           Medications:  • furosemide  40 mg     • spironolactone  12.5 mg     • carvedilol  3.125 mg     • nystatin  5 mL     • enoxaparin (LOVENOX) injection  150 mg     • rosuvastatin  40 mg     • digoxin  250 mcg     • amiodarone  400 mg     • docusate sodium 100mg/10mL  100 mg      Or   • docusate sodium  100 mg     • ceFAZolin  2 g     • potassium bicarbonate  50 mEq     • aspirin  81 mg     • K+ Scale: Goal of 4.5  1 Each     • insulin NPH  5 Units     • famotidine  20 mg     • chlorhexidine  15 mL     • insulin lispro  0-15 Units     • senna-docusate  1 Tab         Exam:   Review of Systems   Unable to perform ROS: Intubated       Physical Exam   Constitutional: No distress. He is intubated.   Morbidly obese   Neck: Neck supple.   Cardiovascular: Normal rate and regular rhythm.  Exam reveals no gallop.    No murmur heard.  Pulmonary/Chest: Effort normal. He is intubated. No respiratory distress. He has decreased breath sounds in the right lower field and the left lower field.   Abdominal: Soft. He exhibits no distension.   Musculoskeletal: He exhibits edema.   Neurological:   sedated   Skin: Skin is warm.   Psychiatric:   JUAREZ       Quality Measures:     Reviewed items::  EKG reviewed, Labs reviewed, Medications reviewed and Radiology images reviewed  Ceballos catheter::  One or Two Days Post Surgery (Day of Surgery being Day 0) and Critically Ill - Requiring Accurate Measurement of Urinary Output  Central line in place:  Need  for access and Vasopressors  DVT prophylaxis pharmacological::  Contraindicated - High bleeding risk  DVT prophylaxis - mechanical:  SCDs  Ulcer Prophylaxis::  Yes      Assessment/Plan:  POD 1 - HOTN- IABP 1:2, epi/jazz, vent- peep inc this am, keep CT/jenkins, CPM  POD 2 HOTN - epi/jazz- IABP 1:2, Vent - pulm following, s/p PEA arrest yesterday- bronch with lots of thick secretions, sedated, CPM  POD 3 - HOTN - epi/jazz- decreased from yesterday, IABP 1:2, Vent - CMV 26, PEEP 10, sedated, diurese well yesterday- start lasix gtt today, CPM  POD 4 NSR, Hotn-epi/jazz, down slightly form yesterday.  IABP 1:2 no change in pressures with pump on standby--change to 1:4.  On lasix gtt, diuresing well.  FiO2 down to 40%, PEEP 10.  Continue lasix gtt.  Will plan on removing IABP today. CPM.   POD 5 HDS, SB- d/c amio, epi/jazz- weaning, Vent- per pulm, sedated when awake follows per RN, lasix gtt- cont, CPM  POD 6 HDS, NSR, epi/jazz--weaning.  On lasix gtt.  WBCs up, bronch yesterday now on ABX.  Mod r effusion.  Will tap.  Keep SERGE drain L thigh one more day.Keep CTs while vented.  CPM.  POD 7 HDS, on low dose epi, off jazz.  NSR. RUE thrombus started on heparin gtt--held this AM for thoracentesis.  Vent 40%/peep 8.  Sedation weaning.  PLAN:  DC temp wires, resume anticoagulation after thoracentesis.  Vent per pulm. Keep CTs today.  POD 8 Remains on epi, no change.  Back in afib yesterday, rebolused amio, now AT vs a flutter?  Cardiology to address. On lovenox.  CXR remains with edema/effusions, unable to perform thoracentesis yesterday. Keep lasix gtt today and k-scale.   Agitated, kept on sedation.  Vent per PMA.  May need to move toward trach next few days.  Plan to hold lovenox in AM to DC CTs and SERGE drain.  DC prevena, DC staples EVH site. CPM.  POD 9 Epi/jazz/lasix, ST, VDRF, sedated.  Lasix gtt.  PLAN: Vent per PMA,  Lovenox held for trach likely today.  DC SERGE drain.  CPM.   POD 10 Epi/jazz/lasix, s/p trach, sedated but more  alert/cooperative this AM.  Some serosanguinous oozing SERGE exit site.  Echo today per cardiology. CPM.  POD 11 Still on epi and jazz drips.  Awake alert.  Follows commands.  Awaiting echo.  Once off drips to LTAC for rehab.  Moderate left pleural effusion.  Will order bedside tap.   POD 12 Paroxysmal afib, anticoagulated on lovenox, cards following.  Off jazz, low dose epi only.  More alert today, follows, ROJAS. Thoracentesis planned for today.  CPM.  Push PT/OT.  POD 13 Paroxysmal afib, back on epi.  1000ml off L thoracentesis yesterday and s/p bronch by pulmonary.  To cardiac chair today.  Patient anxious at night, on precedex. Add PRN xanax, wean off precedex. Wean off epi as able.  CPM.    POD 14  HDS, afib rate controlled, neuro intact on precedex drip., wounds CDI, abdomin S/NT, fluid balance negative, wt stable.  Off pressors.  Awake alert.  Plan:  Start low dose  BB, no ACE HOTN, Statin, IS/ambulate. CPM.      Active Hospital Problems    Diagnosis   • CAD (coronary artery disease) [I25.10]   • CHF (congestive heart failure) (CMS-HCC) [I50.9]

## 2018-01-05 NOTE — DISCHARGE PLANNING
Medical SW    Referral: Sw attended AM IDT Rounds.    Intervention: nods and mouths appropriately, anxious,      Plan: Sw to assist w/ d/c planning as needed.

## 2018-01-06 ENCOUNTER — APPOINTMENT (OUTPATIENT)
Dept: RADIOLOGY | Facility: MEDICAL CENTER | Age: 53
DRG: 003 | End: 2018-01-06
Attending: INTERNAL MEDICINE
Payer: MEDICARE

## 2018-01-06 LAB
ANION GAP SERPL CALC-SCNC: 6 MMOL/L (ref 0–11.9)
BACTERIA FLD AEROBE CULT: NORMAL
BUN SERPL-MCNC: 49 MG/DL (ref 8–22)
CALCIUM SERPL-MCNC: 8.1 MG/DL (ref 8.5–10.5)
CHLORIDE SERPL-SCNC: 110 MMOL/L (ref 96–112)
CO2 SERPL-SCNC: 33 MMOL/L (ref 20–33)
CREAT SERPL-MCNC: 1.15 MG/DL (ref 0.5–1.4)
EKG IMPRESSION: NORMAL
ERYTHROCYTE [DISTWIDTH] IN BLOOD BY AUTOMATED COUNT: 66.1 FL (ref 35.9–50)
GFR SERPL CREATININE-BSD FRML MDRD: >60 ML/MIN/1.73 M 2
GLUCOSE BLD-MCNC: 118 MG/DL (ref 65–99)
GLUCOSE BLD-MCNC: 118 MG/DL (ref 65–99)
GLUCOSE BLD-MCNC: 130 MG/DL (ref 65–99)
GLUCOSE BLD-MCNC: 131 MG/DL (ref 65–99)
GLUCOSE BLD-MCNC: 135 MG/DL (ref 65–99)
GLUCOSE BLD-MCNC: 145 MG/DL (ref 65–99)
GLUCOSE SERPL-MCNC: 130 MG/DL (ref 65–99)
GRAM STN SPEC: NORMAL
HCT VFR BLD AUTO: 27.6 % (ref 42–52)
HGB BLD-MCNC: 7.6 G/DL (ref 14–18)
INR PPP: 1.2 (ref 0.87–1.13)
MCH RBC QN AUTO: 23.6 PG (ref 27–33)
MCHC RBC AUTO-ENTMCNC: 27.5 G/DL (ref 33.7–35.3)
MCV RBC AUTO: 85.7 FL (ref 81.4–97.8)
PLATELET # BLD AUTO: 540 K/UL (ref 164–446)
PMV BLD AUTO: 10.5 FL (ref 9–12.9)
POTASSIUM SERPL-SCNC: 4.5 MMOL/L (ref 3.6–5.5)
PROTHROMBIN TIME: 14.9 SEC (ref 12–14.6)
RBC # BLD AUTO: 3.22 M/UL (ref 4.7–6.1)
SIGNIFICANT IND 70042: NORMAL
SITE SITE: NORMAL
SODIUM SERPL-SCNC: 149 MMOL/L (ref 135–145)
SOURCE SOURCE: NORMAL
WBC # BLD AUTO: 17.5 K/UL (ref 4.8–10.8)

## 2018-01-06 PROCEDURE — 82962 GLUCOSE BLOOD TEST: CPT

## 2018-01-06 PROCEDURE — 71045 X-RAY EXAM CHEST 1 VIEW: CPT

## 2018-01-06 PROCEDURE — 700111 HCHG RX REV CODE 636 W/ 250 OVERRIDE (IP): Performed by: CLINICAL NURSE SPECIALIST

## 2018-01-06 PROCEDURE — A9270 NON-COVERED ITEM OR SERVICE: HCPCS | Performed by: THORACIC SURGERY (CARDIOTHORACIC VASCULAR SURGERY)

## 2018-01-06 PROCEDURE — A9270 NON-COVERED ITEM OR SERVICE: HCPCS | Performed by: INTERNAL MEDICINE

## 2018-01-06 PROCEDURE — 700102 HCHG RX REV CODE 250 W/ 637 OVERRIDE(OP): Performed by: THORACIC SURGERY (CARDIOTHORACIC VASCULAR SURGERY)

## 2018-01-06 PROCEDURE — A9270 NON-COVERED ITEM OR SERVICE: HCPCS | Performed by: CLINICAL NURSE SPECIALIST

## 2018-01-06 PROCEDURE — 700102 HCHG RX REV CODE 250 W/ 637 OVERRIDE(OP): Performed by: NURSE PRACTITIONER

## 2018-01-06 PROCEDURE — 700111 HCHG RX REV CODE 636 W/ 250 OVERRIDE (IP): Performed by: INTERNAL MEDICINE

## 2018-01-06 PROCEDURE — 93005 ELECTROCARDIOGRAM TRACING: CPT | Performed by: INTERNAL MEDICINE

## 2018-01-06 PROCEDURE — 700102 HCHG RX REV CODE 250 W/ 637 OVERRIDE(OP): Performed by: CLINICAL NURSE SPECIALIST

## 2018-01-06 PROCEDURE — 93010 ELECTROCARDIOGRAM REPORT: CPT | Performed by: INTERNAL MEDICINE

## 2018-01-06 PROCEDURE — A9270 NON-COVERED ITEM OR SERVICE: HCPCS | Performed by: NURSE PRACTITIONER

## 2018-01-06 PROCEDURE — 700102 HCHG RX REV CODE 250 W/ 637 OVERRIDE(OP): Performed by: INTERNAL MEDICINE

## 2018-01-06 PROCEDURE — 85610 PROTHROMBIN TIME: CPT

## 2018-01-06 PROCEDURE — 700101 HCHG RX REV CODE 250: Performed by: INTERNAL MEDICINE

## 2018-01-06 PROCEDURE — 80048 BASIC METABOLIC PNL TOTAL CA: CPT

## 2018-01-06 PROCEDURE — 770022 HCHG ROOM/CARE - ICU (200)

## 2018-01-06 PROCEDURE — 85027 COMPLETE CBC AUTOMATED: CPT

## 2018-01-06 PROCEDURE — 94003 VENT MGMT INPAT SUBQ DAY: CPT

## 2018-01-06 RX ORDER — CAPTOPRIL 12.5 MG/1
12.5 TABLET ORAL EVERY 8 HOURS
Status: DISCONTINUED | OUTPATIENT
Start: 2018-01-06 | End: 2018-01-07

## 2018-01-06 RX ORDER — SPIRONOLACTONE 25 MG/1
25 TABLET ORAL
Status: DISCONTINUED | OUTPATIENT
Start: 2018-01-07 | End: 2018-01-07

## 2018-01-06 RX ORDER — QUETIAPINE FUMARATE 25 MG/1
50 TABLET, FILM COATED ORAL 3 TIMES DAILY
Status: DISCONTINUED | OUTPATIENT
Start: 2018-01-06 | End: 2018-01-10 | Stop reason: HOSPADM

## 2018-01-06 RX ORDER — WARFARIN SODIUM 5 MG/1
5 TABLET ORAL
Status: DISCONTINUED | OUTPATIENT
Start: 2018-01-06 | End: 2018-01-08

## 2018-01-06 RX ADMIN — TRAMADOL HYDROCHLORIDE 50 MG: 50 TABLET, COATED ORAL at 20:49

## 2018-01-06 RX ADMIN — CAPTOPRIL 12.5 MG: 12.5 TABLET ORAL at 11:41

## 2018-01-06 RX ADMIN — DIGOXIN 250 MCG: 125 TABLET ORAL at 17:44

## 2018-01-06 RX ADMIN — MORPHINE SULFATE 4 MG: 4 INJECTION INTRAVENOUS at 09:07

## 2018-01-06 RX ADMIN — POTASSIUM BICARBONATE 50 MEQ: 25 TABLET, EFFERVESCENT ORAL at 23:08

## 2018-01-06 RX ADMIN — INSULIN HUMAN 5 UNITS: 100 INJECTION, SUSPENSION SUBCUTANEOUS at 06:06

## 2018-01-06 RX ADMIN — AMIODARONE HYDROCHLORIDE 400 MG: 200 TABLET ORAL at 08:35

## 2018-01-06 RX ADMIN — QUETIAPINE FUMARATE 50 MG: 25 TABLET ORAL at 20:49

## 2018-01-06 RX ADMIN — CARVEDILOL 3.12 MG: 3.12 TABLET, FILM COATED ORAL at 08:36

## 2018-01-06 RX ADMIN — POTASSIUM BICARBONATE 50 MEQ: 25 TABLET, EFFERVESCENT ORAL at 11:36

## 2018-01-06 RX ADMIN — POTASSIUM BICARBONATE 50 MEQ: 25 TABLET, EFFERVESCENT ORAL at 00:34

## 2018-01-06 RX ADMIN — FAMOTIDINE 20 MG: 20 TABLET, FILM COATED ORAL at 08:35

## 2018-01-06 RX ADMIN — ROSUVASTATIN CALCIUM 40 MG: 10 TABLET, FILM COATED ORAL at 20:48

## 2018-01-06 RX ADMIN — ENOXAPARIN SODIUM 150 MG: 150 INJECTION SUBCUTANEOUS at 08:36

## 2018-01-06 RX ADMIN — WARFARIN SODIUM 5 MG: 5 TABLET ORAL at 18:04

## 2018-01-06 RX ADMIN — DEXMEDETOMIDINE HYDROCHLORIDE 0.3 MCG/KG/HR: 4 INJECTION, SOLUTION INTRAVENOUS at 08:36

## 2018-01-06 RX ADMIN — FUROSEMIDE 40 MG: 10 INJECTION, SOLUTION INTRAMUSCULAR; INTRAVENOUS at 15:32

## 2018-01-06 RX ADMIN — POTASSIUM BICARBONATE 50 MEQ: 25 TABLET, EFFERVESCENT ORAL at 17:45

## 2018-01-06 RX ADMIN — CHLORHEXIDINE GLUCONATE 15 ML: 1.2 RINSE ORAL at 20:48

## 2018-01-06 RX ADMIN — DEXMEDETOMIDINE HYDROCHLORIDE 0.5 MCG/KG/HR: 4 INJECTION, SOLUTION INTRAVENOUS at 03:42

## 2018-01-06 RX ADMIN — CHLORHEXIDINE GLUCONATE 15 ML: 1.2 RINSE ORAL at 08:36

## 2018-01-06 RX ADMIN — ASPIRIN 81 MG: 81 TABLET, CHEWABLE ORAL at 08:35

## 2018-01-06 RX ADMIN — CEFTRIAXONE 2 G: 2 INJECTION, POWDER, FOR SOLUTION INTRAMUSCULAR; INTRAVENOUS at 08:37

## 2018-01-06 RX ADMIN — NYSTATIN 500000 UNITS: 100000 SUSPENSION ORAL at 08:35

## 2018-01-06 RX ADMIN — NYSTATIN 500000 UNITS: 100000 SUSPENSION ORAL at 20:48

## 2018-01-06 RX ADMIN — QUETIAPINE FUMARATE 50 MG: 25 TABLET ORAL at 15:32

## 2018-01-06 RX ADMIN — DIPHENHYDRAMINE HCL 25 MG: 25 TABLET ORAL at 20:49

## 2018-01-06 RX ADMIN — AMIODARONE HYDROCHLORIDE 400 MG: 200 TABLET ORAL at 20:48

## 2018-01-06 RX ADMIN — NYSTATIN 500000 UNITS: 100000 SUSPENSION ORAL at 17:44

## 2018-01-06 RX ADMIN — ALPRAZOLAM 0.5 MG: 0.5 TABLET ORAL at 03:32

## 2018-01-06 RX ADMIN — POTASSIUM BICARBONATE 50 MEQ: 25 TABLET, EFFERVESCENT ORAL at 06:00

## 2018-01-06 RX ADMIN — ENOXAPARIN SODIUM 150 MG: 150 INJECTION SUBCUTANEOUS at 20:50

## 2018-01-06 RX ADMIN — SPIRONOLACTONE 12.5 MG: 25 TABLET, FILM COATED ORAL at 08:35

## 2018-01-06 RX ADMIN — FUROSEMIDE 40 MG: 10 INJECTION, SOLUTION INTRAMUSCULAR; INTRAVENOUS at 06:00

## 2018-01-06 RX ADMIN — ALPRAZOLAM 0.5 MG: 0.5 TABLET ORAL at 18:10

## 2018-01-06 RX ADMIN — NYSTATIN 500000 UNITS: 100000 SUSPENSION ORAL at 11:36

## 2018-01-06 RX ADMIN — INSULIN HUMAN 5 UNITS: 100 INJECTION, SUSPENSION SUBCUTANEOUS at 21:15

## 2018-01-06 RX ADMIN — FAMOTIDINE 20 MG: 20 TABLET, FILM COATED ORAL at 20:49

## 2018-01-06 RX ADMIN — INSULIN HUMAN 5 UNITS: 100 INJECTION, SUSPENSION SUBCUTANEOUS at 15:33

## 2018-01-06 RX ADMIN — STANDARDIZED SENNA CONCENTRATE AND DOCUSATE SODIUM 1 TABLET: 8.6; 5 TABLET, FILM COATED ORAL at 20:48

## 2018-01-06 ASSESSMENT — PAIN SCALES - GENERAL: PAINLEVEL_OUTOF10: 10

## 2018-01-06 NOTE — CARE PLAN
Problem: Pain Management  Goal: Pain level will decrease to patient's comfort goal  Outcome: PROGRESSING AS EXPECTED  Patient uses 1-10 rating scale to rate pain, non-pharmacologic measures in place (distraction, family interaction, massage)    Problem: Skin Integrity  Goal: Risk for impaired skin integrity will decrease  Outcome: PROGRESSING AS EXPECTED  Patient turned q2 hours, barrier cream and moisturizer in use, waffle cushion in use

## 2018-01-06 NOTE — CARE PLAN
Problem: Safety  Goal: Will remain free from injury  Outcome: PROGRESSING SLOWER THAN EXPECTED  Patient was started on precedex  At .5 mcg/kg/hr for acute anxiety and for general patient safety     Problem: Pain  Goal: Alleviation of Pain or a reduction in pain to the patient's comfort goal    Intervention: Pain Management--Medications  4 mg of PRN Morphine was given for noted pain. Will reassess patient's pain every two hours.

## 2018-01-06 NOTE — PROGRESS NOTES
Pulmonary Critical Care Progress Note      DOS:  1/6/18, admit 12/19/2017    Chief Complaint: CAD, 2V CABG    History of Present Illness: 51 y/o M, h/o DM, COPD, DLD, CAD, SHASHANK, Obesity; underwent 2v CABG 12/22    Interval Events:  24 hour interval history reviewed   Reason for visit:  A&C respiratory failure, CAD s/p CABG, AF/flut, Low K/Mg, S/p trach 12/31    Agitated last night  DEX resumed  Restrained again  QTc?  TF goal  UO good  Afebrile  EOB/chair  PEEP 10 35%  RSBI 70s  Min clear  Secretions  Full dose Lovenox  Ceftriaxone 2/7  IV lasix ongoing    EKG with QTC measuring 438  Seroquel started  EKG       YESTERDAY  AFib/flut - 70-80s  Off DEX  Xanax  CPAP trials ongoing, intermittently has high R SBI and has to be switched back to full support  Klebsiella oxytoca that is resistant only to ampicillin is growing in BAL  Ancef 10/10  Tm 38.6  WBC improving down from 27.9-22.6  Change to C3 - 7 days     YESTERDAY  S/p thoracentesis for > 1000cc yesteday  Tx/Dx FOB yesterday - BAL sent, some plugs LLL cleared  Follows  Confused/impulsive  DEX 0.6  Xanax pewr [facility]  EPI 0.04  AF 70-100s  Labile Bp  NE stopped yesterday  TF goal  Great UO  I/Os neg 1160/24 hrs  Tm 38.5 - WBC still up  SBT failed - RSBI > 120 few minutes  Full dose Lovenox  Ancef 9/10  Micro GNR in BAL?  Pleural neg stain       YESTERDAY  Mildly confused - follows  Amio  Back in AF 120s  KRISTIN off  EPI 0.03  DEX 0.7  FENT 50 -> off now  EOB  L thora pending  Lovensox on hold  PEEP 8 and fiO2 increased to 0.5  Lots secretions  Ancef  8/10    Serial follow-up evaluations performed  Thoracentesis attempted by radiology, unsuccessful  Interventional radiologist requested to perform thoracentesis and successfully performed late afternoon with over 1000 cc removed, studies pending    Low-grade fevers throughout the day, worsening oxygenation requiring PEEP to be increased back from 8-10 words been the last several days. Perhaps increased secretions from  tracheostomy tube.  Reviewed with family at bedside, diagnostic and therapeutic bronchoscopy to be performed    Hemodynamics remain okay, blood pressure a little soft/slightly low at times, may be related to blood pressure cuff       YESTERDAY  Sedate on vent - follows some  Trach ok  SBT 1 hr RSBI 59  SBT 1 hr x2 yesterday  CXR better  - less eff/atx on R - L effusion worse?  DEX 0.8  Fent 100  Afib/flutter - rate 70-80s  CVP 7-12  Lasix drip -> Lasix 80 BID IV  Amio to PO  KRISTIN 10  EPI 0.05  Tm 38.3  TF goal   UO adequate - I/Os neg 856  Full dose lovenox - last dose this AM  Ancef 7/10    D/w family at bedside       YESTERDAY  Sedate on vent  Follows some - less agitated  AF/flut - HR 80s  Bp ok on pressors  Tm 37.9  Trach not bleeding  KRISTIN 50  EPI 0.03  Amio 0.5  Fent 100  Prop 20  Lasix 10/hr drip  Full dose Lovenox  Ancef 6/10  K 3.5  Check Mg  CXR wet/BSSLLA - no change vs old film  Therapies    YESTERDAY   - AF/flutter, , amio .5   - epi, kristin gtts   - lasix gtt   - I/O 4.7/6.5   - vent day 9, min secretions   - cxr unchagned   - CTs out     - Lovenox 150 bid, held for trach    - day 5 of 10 Ancef for MSSA, kleb BAL   - Cr up 1.28   - perc trach today  Yesterday   - POD8   - arouses, follows, restless, prop 60   - AF/flutter, 120's   - amiodarone, gtt .5   - epi gtt, kristin gtt   - lasix gtt 10/hr   - williams TF at 80   - good UOP, I/O 5.5/7.8   - Tm 38.3   - full dose lovenox   - RUE DVT   - vent day 9, 8, 40%; no SBT, cxr edema, atx unchanged   - ancef day 2 for kleb/mssa pna   - will need trach  :  Review of Systems   Unable to perform ROS: Acuity of condition       Physical Exam   Constitutional: He appears well-developed and well-nourished.   Remains confused but follows, since of humor may be returning   HENT:   Head: Normocephalic and atraumatic.   Eyes: Pupils are equal, round, and reactive to light. No scleral icterus.   Neck: No JVD present. No tracheal deviation present.   Cardiovascular: Normal rate  and intact distal pulses.  Exam reveals no gallop and no friction rub.    No murmur heard.  Pulmonary/Chest: No respiratory distress. He has rales.   Diminished at bases improved after both thoracentesis and therapeutic bronchoscopy, scattered coarse crackles, no wheezes - better on R, trach site okay, secretions from trach with suctioning.   Abdominal: Soft. He exhibits no distension. There is no tenderness. There is no rebound and no guarding.   Musculoskeletal: He exhibits edema. He exhibits no deformity.   Trace edema   Neurological: No cranial nerve deficit.   following some commands intermittently, moves all extremities, still weak diffusely   Skin: Skin is warm and dry.   Psychiatric:   Sedate, mood better   Nursing note and vitals reviewed.     No change      PFSH:  No change.    Respiratory:  Leo Vent Mode: APVCMV, Rate (breaths/min): 26, Vt Target (mL): 540, PEEP/CPAP: 10, FiO2: 40, Control VTE (exp VT): 539  Pulse Oximetry: 98 %    Ventilator mechanics and waveforms are reviewed at the bedside with RT again                    Invalid input(s): [id number]    HemoDynamics:  Pulse: 71, Heart Rate (Monitored): 71  NIBP: (!) 91/45  CVP (mm Hg): (!) 219 MM HG            Neuro:  GCS Total Enrique Coma Score: 11         Fluids:  Intake/Output       01/04/18 0700 - 01/05/18 0659 01/05/18 0700 - 01/06/18 0659 01/06/18 0700 - 01/07/18 0659      [id number] 9537-2086 Total 6724-8481 1339-7937 Total 9646-4689 3710-6773 Total       Intake    I.V.  1585.7  429.1 2014.8  264.4  207.7 472  --  -- --    Precedex Volume 591.9 179.1 770.9 44.4 107.7 152 -- -- --    Epinephrine Volume 463.8 -- 463.8 -- -- -- -- -- --    IV Piggyback Volume 450 250 700 100 -- 100 -- -- --    NS TKO 80 -- 80 120 100 220 -- -- --    Other  360  -- 360  520  200 720  --  -- --    Medications (P.O./ Enteral Liquids) 360 -- 360 520 200 720 -- -- --    Enteral  1380  1020 2400  1120  970 2090  --  -- --    Enteral Volume 1020 1020 2040 850  850 1700 -- -- --    Free Water / Tube Flush 360 -- 360 270 120 390 -- -- --    Total Intake 3325.7 1449.1 4774.8 1904.4 1377.7 3282 -- -- --       Output    Urine  3200  1605 4805  1980 3125  --  -- --    Indwelling Cathether 3200 1605 4805 1980 -- -- --    Stool  --  -- --  --  -- --  --  -- --    Number of Times Stooled 2 x -- 2 x 3 x 2 x 5 x -- -- --    Total Output 3200 1605 4805 1980 -- -- --       Net I/O     125.7 -155.9 -30.2 -75.7 232.7 157 -- -- --        Weight: (!) 157 kg (346 lb 2 oz)  Recent Labs      18   1745  18   0008  1810   SODIUM  142   --   143   --    --    --   148*   POTASSIUM  4.0   < >  4.0   < >  4.0  3.9  4.0   CHLORIDE  103   --   106   --    --    --   113*   CO2  33   --   32   --    --    --   28   BUN  54*   --   46*   --    --    --   47*   CREATININE  1.34   --   1.17   --    --    --   1.14   CALCIUM  8.0*   --   7.8*   --    --    --   7.5*    < > = values in this interval not displayed.       GI/Nutrition:  TF goal  Liver Function  Recent Labs      18   GLUCOSE  155*  181*  129*       Heme:  Recent Labs      18   0510   RBC  3.45*  3.39*  2.98*   HEMOGLOBIN  8.1*  8.0*  7.0*   HEMATOCRIT  28.5*  28.3*  25.6*   PLATELETCT  659*  671*  526*       Infectious Disease:  Temp  Av.4 °C (99.3 °F)  Min: 37.3 °C (99.1 °F)  Max: 37.7 °C (99.9 °F)  Micro: reviewed  Recent Labs      18   0615  18   1545  18   0520  18   0510   WBC  27.8*   --   27.9*  22.6*   EOSINOPHILS   --   1   --    --      Current Facility-Administered Medications   Medication Dose Frequency Provider Last Rate Last Dose   • furosemide (LASIX) injection 40 mg  40 mg BID DIURETIC Jerry Reyes M.D.   40 mg at 18 1716   • spironolactone (ALDACTONE) tablet 12.5 mg  12.5 mg Q DAY Jerry Reyes M.D.   12.5  mg at 01/05/18 0823   • carvedilol (COREG) tablet 3.125 mg  3.125 mg BID WITH MEALS Silvia Preston A.P.N.   3.125 mg at 01/05/18 1721   • cefTRIAXone (ROCEPHIN) syringe 2 g  2 g Q24HRS Silvio Eller M.D.   2 g at 01/05/18 1120   • alprazolam (XANAX) tablet 0.5 mg  0.5 mg TID PRN Monet Antonio A.P.NAlex   0.5 mg at 01/06/18 0332   • nystatin (MYCOSTATIN) 654560 UNIT/ML suspension 500,000 Units  5 mL 4X/DAY Silvio Eller M.D.   500,000 Units at 01/05/18 2026   • enoxaparin (LOVENOX) injection 150 mg  150 mg Q12HRS Kali Evans Jr., D.O.   150 mg at 01/05/18 2029   • rosuvastatin (CRESTOR) tablet 40 mg  40 mg Q EVENING Jerry Reyes M.D.   40 mg at 01/05/18 2025   • digoxin (LANOXIN) tablet 250 mcg  250 mcg DAILY AT 1800 Jerry Reyes M.D.   250 mcg at 01/05/18 1721   • dexmedetomidine (PRECEDEX) 400 mcg/100mL premix infusion  0.1-1.5 mcg/kg/hr Continuous Jerry Reyes M.D. 18.9 mL/hr at 01/06/18 0342 0.5 mcg/kg/hr at 01/06/18 0342   • amiodarone (CORDARONE) tablet 400 mg  400 mg TWICE DAILY Gui Delcid M.D.   400 mg at 01/05/18 2026   • docusate sodium 100mg/10mL (COLACE) solution 100 mg  100 mg DAILY Silvia Preston A.P.N.   Stopped at 01/05/18 0900    Or   • docusate sodium (COLACE) capsule 100 mg  100 mg DAILY Silvia Preston A.P.N.   100 mg at 01/02/18 0831   • potassium bicarbonate (KLYTE) 25 MEQ effervescent tablet TBEF 50 mEq  50 mEq Q6HRS Amrik Banegas M.D.   50 mEq at 01/06/18 0034   • aspirin (ASA) chewable tab 81 mg  81 mg DAILY Kiel Ash M.D.   81 mg at 01/05/18 0823   • tetrahydrozoline (VISINE) 0.05 % ophthalmic solution 1 Drop  1 Drop 4X/DAY PRN Amrik Banegas M.D.       • insulin NPH (HUMULIN,NOVOLIN) injection 5 Units  5 Units Q8HRS Silvia Preston, A.P.N.   5 Units at 01/05/18 2031   • phenylephrine (KRISTIN-SYNEPHRINE) 80,000 mcg in  mL Infusion  0-50 mcg/min Continuous Radha Martinez, A.P.N.   Stopped at 01/02/18 2200   • famotidine (PEPCID) tablet  20 mg  20 mg BID Rodolfo Landa M.D.   20 mg at 01/05/18 2026   • chlorhexidine (PERIDEX) 0.12 % solution 15 mL  15 mL BID Rodolfo Landa M.D.   15 mL at 01/05/18 2026   • fentaNYL (SUBLIMAZE) 50 mcg/mL in 50mL (Continuous Infusion)   Continuous Rodolfo Landa M.D.   Stopped at 01/03/18 1045   • insulin lispro (HUMALOG) injection 0-15 Units  0-15 Units Q6HRS SUSI Alfred.P.NAlex   2 Units at 01/06/18 0046   • Respiratory Care per Protocol   Continuous RT Silvia Preston A.P.N.       • NS infusion   Continuous Silvia Preston A.P.N. 10 mL/hr at 01/04/18 0700     • Pharmacy Consult Request ...Pain Management Review 1 Each  1 Each PRN Silvia Preston A.P.N.       • senna-docusate (PERICOLACE or SENOKOT S) 8.6-50 MG per tablet 1 Tab  1 Tab Nightly Silvia Preston A.P.N.   Stopped at 01/04/18 2100    And   • senna-docusate (PERICOLACE or SENOKOT S) 8.6-50 MG per tablet 1 Tab  1 Tab Q24HRS PRN Silvia Preston A.P.N.   1 Tab at 12/28/17 0732    And   • lactulose 20 GM/30ML solution 30 mL  30 mL Q24HRS PRN Silvia Preston A.P.N.   30 mL at 01/03/18 0912    And   • bisacodyl (DULCOLAX) suppository 10 mg  10 mg Q24HRS PRN Silvia Preston A.P.N.   10 mg at 01/03/18 0912    And   • fleet enema 133 mL  1 Each Once PRN Silvia Preston A.P.N.       • electrolyte-A (PLASMALYTE-A) infusion   PRN Silvia Preston A.P.N.       • oxycodone immediate release (ROXICODONE) tablet 5 mg  5 mg Q3HRS PRN Silvia Preston A.P.N.   5 mg at 01/05/18 1722   • oxycodone immediate release (ROXICODONE) tablet 10 mg  10 mg Q3HRS PRN Silvia Preston, A.P.N.   10 mg at 01/05/18 1416   • morphine (pf) 4 mg/ml injection 4 mg  4 mg Q3HRS PRN Silvia Preston, A.P.N.   4 mg at 01/03/18 1743   • tramadol (ULTRAM) 50 MG tablet 50 mg  50 mg Q4HRS PRN Silvia Preston, A.P.N.       • morphine (pf) 4 mg/ml injection 4 mg  4 mg Q HOUR PRN Silvia Preston, A.P.N.   4 mg at 01/05/18 2200   • ondansetron (ZOFRAN) syringe/vial injection 4 mg  4 mg Q6HRS PRN Silvia AMES  Mohan, A.P.N.        Or   • prochlorperazine (COMPAZINE) injection 10 mg  10 mg Q6HRS PRN Silvia Preston, A.P.N.        Or   • promethazine (PHENERGAN) suppository 25 mg  25 mg Q6HRS PRN Silvia Preston, A.P.N.       • acetaminophen (TYLENOL) tablet 650 mg  650 mg Q4HRS PRN Silvia Preston A.P.N.   650 mg at 01/01/18 1840    Or   • acetaminophen (TYLENOL) suppository 650 mg  650 mg Q4HRS PRN Silvia Preston, A.P.N.       • mag hydrox-al hydrox-simeth (MAALOX PLUS ES or MYLANTA DS) suspension 30 mL  30 mL Q4HRS PRN Silvia Preston A.P.N.   30 mL at 01/01/18 0753   • diphenhydrAMINE (BENADRYL) tablet/capsule 25 mg  25 mg HS PRN - MR X 1 Silvia Preston A.P.N.   25 mg at 01/02/18 0013   • epinephrine 1 mg/mL(1:1000) 8 mg in  mL Infusion  0-0.2 mcg/kg/min Continuous Kiel Ash M.D.   Stopped at 01/04/18 1458   • albuterol inhaler 2 Puff  2 Puff Q4H PRN (RT) Kiel Ash M.D.       • ipratropium-albuterol (DUONEB) nebulizer solution 3 mL  3 mL Q4H PRN (RT) Kiel Ash M.D.   3 mL at 12/23/17 1154     Last reviewed on 12/22/2017  7:07 AM by Katie Aly R.N.    Quality  Measures:  Labs reviewed, Medications reviewed and Radiology images reviewed                      Assessment/Plan:  Status post 2-vessel coronary artery bypass grafting on 12/22/2017.   - Status post IABP    - EPI drip weaned off AM 1/4   - chest tubes out   - off Lasix drip, monitor volume status and hemodynamics closely - on IV Lasix   - Postoperative brief cardiac arrest, now awake and following/sedated  Acute hypoxic respiratory failure secondary to above.   - intubated 12/22   - Continue full ventilator support today, SBTs as c linically appropriate   - s/p trach 12/31   - serial ABG/CXR and vent mechanics review   - LTACH eval   - sedation vacations - try to swap Prop to DEX - lighten sedation   - Mobilize when safe as tolerated  ABN CXR - bilateral effusion/atx    - L effuison worse - L thoracentesis -1/3   - BAL 1/3  fluid growing Klebsiella oxytoca, will treat with 7 days ceftriaxone - stopped 1/12   - Serial chest x-ray to eval for the need for repeat bronchoscopy or thoracentesis   - Aggressive pulmonary toilet and mobilize as tolerated to help prevent above  AF/RVR   - Amiodarone   - Digoxin   - Anti-coag   - ERP keep K > 4 and Mg > 2  Coronary artery disease with multivessel disease - ASA/full antiocoag  Pneumonia   - MSSA and Klebsiella identified   - Antibiotics de-escalated to Ancef, continue ×10 days - stop 1/5  Cardiomyopathy EF 35% and global systolic dysfunction - ionotropes/forced diuresis as needed  Severely dilated right ventricle - caution with volume excess  Chronic obstructive pulmonary disease - RT protocosl  Reported diabetes.   - ssi/NPH and q6 FS  Dyslipidemia - statin  Clinically with obstructive sleep apnea - slow removal of trach - PSG later?  LTACH consult pending - not ready yet - early next week?  Agitation secondary to above - Seroquel trial - trying to stay off intravenous sedation in any form to facilitate rehabilitation  Prophylaxis - ABCDEF bundle      Discussed patient condition and risk of morbidity and/or mortality with Family, RN, RT, Pharmacy, Charge nurse / hot rounds and [facility].

## 2018-01-06 NOTE — PROGRESS NOTES
Patient presented with severe anxiety and agitation; i.e. pulling tubes, trach and required constant re-positioning. Precedex 0.5 mcg/kg/hr restarted at 2158 for safety and anti- anxiety measures.

## 2018-01-06 NOTE — CARE PLAN
Problem: Ventilation Defect:  Goal: Ability to achieve and maintain unassisted ventilation or tolerate decreased levels of ventilator support  Outcome: PROGRESSING AS EXPECTED  Adult Ventilation Update    Total Vent Days: 16, TD 7    Patient Lines/Drains/Airways Status    Active Airway     Name: Placement date: Placement time: Site: Days:    Airway Group Standard Cuffed Trach Tracheostomy 8.0 12/31/17   1106   Tracheostomy   6            APVcmv  26  /  540  /  +10  /  30%    In the last 24 hours, the patient tolerated SBT for 8 hours on settings of 8 over 10.    #FVC / Vital Capacity (liters) : 1.1 (01/02/18 0802)  NIF (cm H2O) : -23 (01/02/18 0802)  Rapid Shallow Breathing Index (RR/VT): 55 (01/05/18 1812)  Plateau Pressure (Q Shift): 24 (01/06/18 0630)  Static Compliance (ml / cm H2O): 42 (01/06/18 1433)    Patient failed trials because of Barriers to Wean: Other (Comments) (hemodynamically unstable) (01/01/18 1257)  Barriers to SBT Weaning Trial Stopped due to::  (4 hours completed) (01/05/18 1812)  Length of Weaning Trial Length of Weaning Trial (Hours): 4 (01/05/18 1812)    Sputum/Suction   Cough:  (sxn not indicated at this time) (01/06/18 1429)  Sputum Amount: Moderate (01/06/18 1200)  Sputum Color: White;Clear (01/06/18 1200)  Sputum Consistency: Thick;Thin (01/06/18 1200)    Mobility Group  Activity Performed: Unable to mobilize (01/06/18 0630)  Time Activity Tolerated: 10 min (01/05/18 1400)  Distance Per Occurrence (ft.): 200 feet (12/21/17 2030)  # of Times Distance was Traveled: 2 (12/21/17 2030)  Assistance / Tolerance: Assistance of Two or More;Tolerates Well (01/05/18 1400)  Pt Calls for Assistance: No (01/06/18 1400)  Staff Present for Mobilization: RN;RT (01/05/18 1400)  Gait: Steady (12/21/17 2030)  Assistive Devices: Slide board (01/04/18 1000)  Reason Not Mobilized: Other (comment) (will reassess with day RN) (01/06/18 0630)  Mobilization Comments:  (RASS +3 off sedation, pt. started swinging at  me) (01/05/18 2000)    Events/Summary/Plan: SBT, RN aware (01/06/18 1430)

## 2018-01-06 NOTE — PROGRESS NOTES
Received bedside report form Savannah COPELAND. Pt. Appeared  anxious on initial presentation; coughing, but still tolerating ventilator.No drips other than TKO to verify. Patient denied pain, skin assessed with day-shift nurse, left groin warm and slightly raised (open to air), SCD's on patient, no family at bedside all questions answered.

## 2018-01-06 NOTE — PROGRESS NOTES
Cardiology Progress Note               Author: Jerry Reyes Date & Time created: 1/6/2018  9:14 AM     Interval History:  No issues overnight  BP improving  On min sedation      Chief Complaint:  VDRF  CHFrEf  A-fib  CAD s/p CABG    Review of Systems   Unable to perform ROS: Intubated       Physical Exam   Constitutional: He is oriented to person, place, and time. He appears well-developed and well-nourished. No distress.   HENT:   Head: Normocephalic.   Mouth/Throat: Oropharynx is clear and moist.   Eyes: EOM are normal. Pupils are equal, round, and reactive to light. Right eye exhibits no discharge. Left eye exhibits no discharge. No scleral icterus.   Neck: Normal range of motion. Neck supple. No JVD present. No tracheal deviation present.   Cardiovascular: Normal rate, regular rhythm, S1 normal, S2 normal, normal heart sounds, intact distal pulses and normal pulses.  Exam reveals no gallop, no S3, no S4 and no friction rub.    No murmur heard.   No systolic murmur is present    No diastolic murmur is present   Pulses:       Carotid pulses are 2+ on the right side, and 2+ on the left side.       Radial pulses are 2+ on the right side, and 2+ on the left side.        Dorsalis pedis pulses are 2+ on the right side, and 2+ on the left side.        Posterior tibial pulses are 2+ on the right side, and 2+ on the left side.   Pulmonary/Chest: Effort normal and breath sounds normal. No respiratory distress. He has no wheezes. He has no rales.   Abdominal: Soft. Bowel sounds are normal. He exhibits no distension and no mass. There is no tenderness. There is no rebound and no guarding.   Musculoskeletal: He exhibits no edema.   Neurological: He is alert and oriented to person, place, and time. No cranial nerve deficit.   Skin: Skin is warm and dry. He is not diaphoretic. No pallor.   Psychiatric: He has a normal mood and affect. His behavior is normal. Judgment and thought content normal.   Nursing note and vitals  reviewed.      Hemodynamics:  Temp (24hrs), Av.3 °C (99.1 °F), Min:37.2 °C (99 °F), Max:37.4 °C (99.3 °F)  Temperature: 37.2 °C (99 °F)  Pulse  Av  Min: 0  Max: 221Heart Rate (Monitored): 91  NIBP: 126/75  CVP (mm Hg): (!) 7 MM HG (zeroed and calibrated)  Respiratory:  Leo Vent Mode: APVCMV, Rate (breaths/min): 26, PEEP/CPAP: 10, FiO2: 30, P Peak (PIP): 35, P MEAN: 19 Respiration: (!) 30, Pulse Oximetry: 96 %     Work Of Breathing / Effort: Vented  RUL Breath Sounds: Clear;Diminished, RML Breath Sounds: Diminished, RLL Breath Sounds: Diminished, GENNARO Breath Sounds: Clear;Diminished, LLL Breath Sounds: Diminished  Fluids:  Date 18 - 18 0659   Shift [id number] 2608-2999 0224-3406 24 Hour Total   I  N  T  A  K  E   I.V. 57.8   57.8    Other 150   150    Enteral 350   350    Shift Total 557.8   557.8   O  U  T  P  U  T   Shift Total       Weight (kg) 157 157 157 157       Weight: (!) 157 kg (346 lb 2 oz)  GI/Nutrition:  No orders of the defined types were placed in this encounter.    Lab Results:  Recent Labs      18   0510  18   0610   WBC  27.9*  22.6*  17.5*   RBC  3.39*  2.98*  3.22*   HEMOGLOBIN  8.0*  7.0*  7.6*   HEMATOCRIT  28.3*  25.6*  27.6*   MCV  83.5  85.9  85.7   MCH  23.6*  23.5*  23.6*   MCHC  28.3*  27.3*  27.5*   RDW  63.5*  66.4*  66.1*   PLATELETCT  671*  526*  540*   MPV  10.3  11.1  10.5     Recent Labs      18   0520   18   0008  18   0510  18   0610   SODIUM  143   --    --   148*  149*   POTASSIUM  4.0   < >  3.9  4.0  4.5   CHLORIDE  106   --    --   113*  110   CO2  32   --    --   28  33   GLUCOSE  181*   --    --   129*  130*   BUN  46*   --    --   47*  49*   CREATININE  1.17   --    --   1.14  1.15   CALCIUM  7.8*   --    --   7.5*  8.1*    < > = values in this interval not displayed.                 Recent Labs      18   0510   TRIGLYCERIDE  93         Medical Decision Making, by Problem:  Active  Hospital Problems    Diagnosis   • CAD (coronary artery disease) [I25.10]   • CHF (congestive heart failure) (CMS-[facility]) [I50.9]       Plan:  53 yo M with CAD s/p CABG x 2, unable to bypass RCA, CHFrEF from ICM, EF 25-30%.  We will start low dose ACE today and increase his spironolactone to 25 mg.       1. CAD s/p CABG    - cont asa    - cont rosuva to 40     2. CHFrEF, EF 25-30%    - cont dig 0.25 daily    - hold beta blockers for 2 weeks    - cont lasix to 40 IV BID    - increase spironolactone to 25 mg daily    - start captopril 25 TID     3. HLD    - per above     4. Hypotension, shock    -resolving     5. WBC/Resp failure    - defer    Quality-Core Measures

## 2018-01-06 NOTE — PROGRESS NOTES
Bedside report received from Javier COPELAND  Patient now in restraints and on sedation. Patient is agitated and restless, attempting to pull at lines/tubes. Patient educated about necessity and safety of lines/tubes and about restraint discontinuation criteria.

## 2018-01-06 NOTE — CARE PLAN
Problem: Bowel/Gastric:  Goal: Normal bowel function is maintained or improved  Outcome: PROGRESSING AS EXPECTED  Patient having multiple soft-formed Bowel movements, tube feedings running, adequate fluid intake    Problem: Risk for Infection, Impaired Wound Healing  Goal: Promotion of Wound Healing and Drain Management  Outcome: PROGRESSING AS EXPECTED  Incisions approximated and periwound skin is pink and normal temperature, CHG bath provided

## 2018-01-06 NOTE — PROGRESS NOTES
Cardiovascular Surgery Progress Note    Name: Joshua Medrano  MRN: [id number]  : 1965  Admit Date: 2017 10:21 AM  Procedure:  Procedure(s) and Anesthesia Type:     * MULTIPLE CORONARY ARTERY BYPASS ENDO VEIN HARVEST X2 - General     * JIM - General  15 Day Post-Op    Vitals:  Patient Vitals for the past 8 hrs:   Temp SpO2 O2 Delivery Pulse Heart Rate (Monitored) Resp NIBP Weight   18 0900 - - - 79 91 (!) 30 126/75 -   18 0800 37.2 °C (99 °F) 96 % Ventilator 63 62 (!) 27 106/60 -   18 0700 - 93 % - 71 73 (!) 24 100/50 -   18 0630 - 99 % - - 61 - - -   18 0600 37.3 °C (99.1 °F) 95 % Ventilator (!) 122 61 (!) 23 (!) 92/52 -   18 0500 - 95 % - 69 68 (!) 26 (!) 91/41 -   18 0400 37.3 °C (99.1 °F) 98 % Ventilator 71 71 (!) 26 (!) 91/45 (!) 157 kg (346 lb 2 oz)   18 0313 - 92 % - - 69 - - -   18 0300 - 88 % - 69 67 (!) 23 (!) 95/56 -     Temp (24hrs), Av.3 °C (99.1 °F), Min:37.2 °C (99 °F), Max:37.4 °C (99.3 °F)      Respiratory:  Leo Vent Mode: APVCMV, Rate (breaths/min): 26, PEEP/CPAP: 10, FiO2: 30, P Peak (PIP): 35, P MEAN: 19 Respiration: (!) 30, Pulse Oximetry: 96 %     Chest Tube Drains:          Fluids:    Intake/Output Summary (Last 24 hours) at 18 1040  Last data filed at 18 0800   Gross per 24 hour   Intake          3393.29 ml   Output             2245 ml   Net          1148.29 ml     Admit weight: Weight: (!) 159.2 kg (350 lb 15.6 oz)  Current weight: Weight: (!) 157 kg (346 lb 2 oz) (18 0400)    Labs:  Recent Labs      18   0520  18   0510  18   0610   WBC  27.9*  22.6*  17.5*   RBC  3.39*  2.98*  3.22*   HEMOGLOBIN  8.0*  7.0*  7.6*   HEMATOCRIT  28.3*  25.6*  27.6*   MCV  83.5  85.9  85.7   MCH  23.6*  23.5*  23.6*   MCHC  28.3*  27.3*  27.5*   RDW  63.5*  66.4*  66.1*   PLATELETCT  671*  526*  540*   MPV  10.3  11.1  10.5     Recent Labs      18   1545   EOSINOPHILS  1     Recent Labs       01/04/18   0520   01/05/18   0008  01/05/18   0510  01/06/18   0610   SODIUM  143   --    --   148*  149*   POTASSIUM  4.0   < >  3.9  4.0  4.5   CHLORIDE  106   --    --   113*  110   CO2  32   --    --   28  33   GLUCOSE  181*   --    --   129*  130*   BUN  46*   --    --   47*  49*   CREATININE  1.17   --    --   1.14  1.15   CALCIUM  7.8*   --    --   7.5*  8.1*    < > = values in this interval not displayed.           Medications:  • [START ON 1/7/2018] spironolactone  25 mg     • captopril  12.5 mg     • MD ALERT... warfarin       • furosemide  40 mg     • cefTRIAXone (ROCEPHIN) IV  2 g     • nystatin  5 mL     • enoxaparin (LOVENOX) injection  150 mg     • rosuvastatin  40 mg     • digoxin  250 mcg     • amiodarone  400 mg     • docusate sodium 100mg/10mL  100 mg      Or   • docusate sodium  100 mg     • potassium bicarbonate  50 mEq     • aspirin  81 mg     • insulin NPH  5 Units     • famotidine  20 mg     • chlorhexidine  15 mL     • insulin lispro  0-15 Units     • senna-docusate  1 Tab         Exam:   Review of Systems   Unable to perform ROS: Intubated       Physical Exam   Constitutional: No distress. He is intubated.   Morbidly obese   Neck: Neck supple.   Cardiovascular: Normal rate and regular rhythm.  Exam reveals no gallop.    No murmur heard.  Pulmonary/Chest: Effort normal. He is intubated. No respiratory distress. He has decreased breath sounds in the right lower field and the left lower field.   Abdominal: Soft. He exhibits no distension.   Musculoskeletal: He exhibits edema.   Neurological:   sedated   Skin: Skin is warm.   Psychiatric:   JUAREZ       Quality Measures:     Reviewed items::  EKG reviewed, Labs reviewed, Medications reviewed and Radiology images reviewed  Ceballos catheter::  One or Two Days Post Surgery (Day of Surgery being Day 0) and Critically Ill - Requiring Accurate Measurement of Urinary Output  Central line in place:  Need for access and Vasopressors  DVT prophylaxis  pharmacological::  Contraindicated - High bleeding risk  DVT prophylaxis - mechanical:  SCDs  Ulcer Prophylaxis::  Yes      Assessment/Plan:  POD 1 - HOTN- IABP 1:2, epi/jazz, vent- peep inc this am, keep CT/jenkins, CPM  POD 2 HOTN - epi/jazz- IABP 1:2, Vent - pulm following, s/p PEA arrest yesterday- bronch with lots of thick secretions, sedated, CPM  POD 3 - HOTN - epi/jazz- decreased from yesterday, IABP 1:2, Vent - CMV 26, PEEP 10, sedated, diurese well yesterday- start lasix gtt today, CPM  POD 4 NSR, Hotn-epi/jazz, down slightly form yesterday.  IABP 1:2 no change in pressures with pump on standby--change to 1:4.  On lasix gtt, diuresing well.  FiO2 down to 40%, PEEP 10.  Continue lasix gtt.  Will plan on removing IABP today. CPM.   POD 5 HDS, SB- d/c amio, epi/jazz- weaning, Vent- per pulm, sedated when awake follows per RN, lasix gtt- cont, CPM  POD 6 HDS, NSR, epi/jazz--weaning.  On lasix gtt.  WBCs up, bronch yesterday now on ABX.  Mod r effusion.  Will tap.  Keep SERGE drain L thigh one more day.Keep CTs while vented.  CPM.  POD 7 HDS, on low dose epi, off jazz.  NSR. RUE thrombus started on heparin gtt--held this AM for thoracentesis.  Vent 40%/peep 8.  Sedation weaning.  PLAN:  DC temp wires, resume anticoagulation after thoracentesis.  Vent per pulm. Keep CTs today.  POD 8 Remains on epi, no change.  Back in afib yesterday, rebolused amio, now AT vs a flutter?  Cardiology to address. On lovenox.  CXR remains with edema/effusions, unable to perform thoracentesis yesterday. Keep lasix gtt today and k-scale.   Agitated, kept on sedation.  Vent per PMA.  May need to move toward trach next few days.  Plan to hold lovenox in AM to DC CTs and SERGE drain.  DC prevena, DC staples EVH site. CPM.  POD 9 Epi/jazz/lasix, ST, VDRF, sedated.  Lasix gtt.  PLAN: Vent per PMA,  Lovenox held for trach likely today.  DC SERGE drain.  CPM.   POD 10 Epi/jazz/lasix, s/p trach, sedated but more alert/cooperative this AM.  Some serosanguinous  oozing SERGE exit site.  Echo today per cardiology. CPM.  POD 11 Still on epi and jazz drips.  Awake alert.  Follows commands.  Awaiting echo.  Once off drips to LTAC for rehab.  Moderate left pleural effusion.  Will order bedside tap.   POD 12 Paroxysmal afib, anticoagulated on lovenox, cards following.  Off jazz, low dose epi only.  More alert today, follows, ROJAS. Thoracentesis planned for today.  CPM.  Push PT/OT.  POD 13 Paroxysmal afib, back on epi.  1000ml off L thoracentesis yesterday and s/p bronch by pulmonary.  To cardiac chair today.  Patient anxious at night, on precedex. Add PRN xanax, wean off precedex. Wean off epi as able.  CPM.    POD 14  HDS, afib rate controlled, neuro intact on precedex drip., wounds CDI, abdomin S/NT, fluid balance negative, wt stable.  Off pressors.  Awake alert.  Plan:  Start low dose  BB, no ACE HOTN, Statin, IS/ambulate. CPM.  POD 15 HDS, no pressors, leg ozzing from drain site.  Alert.  Follows commands.  Plan:  Wrap leg with ace.  To LTAC soon.  Wean vent as tolerated.      Active Hospital Problems    Diagnosis   • CAD (coronary artery disease) [I25.10]   • CHF (congestive heart failure) (CMS-H[facility]) [I50.9]

## 2018-01-06 NOTE — PROGRESS NOTES
Inpatient Anticoagulation Service Note    Date: 1/6/2018  Reason for Anticoagulation: Atrial Fibrillation, Deep Vein Thrombosis   Hemoglobin Value: (!) 7.6  Hematocrit Value: (!) 27.6  Lab Platelet Value: (!) 540  Target INR: 2.0 to 3.0    INR from last 7 days     Date/Time INR Value    01/06/18 1130 (!)  1.2        Dose from last 7 days     Date/Time Dose (mg)    01/06/18 1600  5 mg PO daily        Average Dose (mg):  (new start)  Significant Interactions: Aspirin, Amiodarone  Bridge Therapy: Yes (Lovenox 150 mg SC BID)  Date of Last VTE Event: 12/28/18  Bridge Therapy Start Date: 01/06/18  Days of Overlap Therapy: 0    Comments: Patient with likely chronic RUE VTE, as well as AF. on amiodarone for AF. Will start daily dosing and trend INR, will likely need to increase dose in a couple of days. Bridge with Lovenox. Checking anti-Xa level tomorrow.    Education Material Provided?: No  Pharmacist suggested discharge dosing: TBD     Jason Aceves, PharmD, BCPS

## 2018-01-06 NOTE — PROGRESS NOTES
Walked into patients room and found the Cortrak out of his nose and hanging on the bed. Had [employer]tra[employer] RN place new tube and STAT Ab-XR ordered. Tube feedings held

## 2018-01-07 ENCOUNTER — APPOINTMENT (OUTPATIENT)
Dept: RADIOLOGY | Facility: MEDICAL CENTER | Age: 53
DRG: 003 | End: 2018-01-07
Attending: INTERNAL MEDICINE
Payer: MEDICARE

## 2018-01-07 LAB
ANION GAP SERPL CALC-SCNC: 5 MMOL/L (ref 0–11.9)
BUN SERPL-MCNC: 54 MG/DL (ref 8–22)
CALCIUM SERPL-MCNC: 8.2 MG/DL (ref 8.5–10.5)
CHLORIDE SERPL-SCNC: 109 MMOL/L (ref 96–112)
CO2 SERPL-SCNC: 33 MMOL/L (ref 20–33)
CREAT SERPL-MCNC: 1.21 MG/DL (ref 0.5–1.4)
ERYTHROCYTE [DISTWIDTH] IN BLOOD BY AUTOMATED COUNT: 65.1 FL (ref 35.9–50)
GFR SERPL CREATININE-BSD FRML MDRD: >60 ML/MIN/1.73 M 2
GLUCOSE BLD-MCNC: 116 MG/DL (ref 65–99)
GLUCOSE BLD-MCNC: 130 MG/DL (ref 65–99)
GLUCOSE BLD-MCNC: 95 MG/DL (ref 65–99)
GLUCOSE SERPL-MCNC: 109 MG/DL (ref 65–99)
HCT VFR BLD AUTO: 28 % (ref 42–52)
HGB BLD-MCNC: 7.6 G/DL (ref 14–18)
INR PPP: 1.25 (ref 0.87–1.13)
MCH RBC QN AUTO: 23 PG (ref 27–33)
MCHC RBC AUTO-ENTMCNC: 27.1 G/DL (ref 33.7–35.3)
MCV RBC AUTO: 84.8 FL (ref 81.4–97.8)
PLATELET # BLD AUTO: 538 K/UL (ref 164–446)
PMV BLD AUTO: 10.5 FL (ref 9–12.9)
POTASSIUM SERPL-SCNC: 4.4 MMOL/L (ref 3.6–5.5)
PROTHROMBIN TIME: 15.4 SEC (ref 12–14.6)
RBC # BLD AUTO: 3.3 M/UL (ref 4.7–6.1)
SODIUM SERPL-SCNC: 147 MMOL/L (ref 135–145)
UFH PPP CHRO-ACNC: 1.2 U/ML
WBC # BLD AUTO: 16 K/UL (ref 4.8–10.8)

## 2018-01-07 PROCEDURE — 85610 PROTHROMBIN TIME: CPT

## 2018-01-07 PROCEDURE — A9270 NON-COVERED ITEM OR SERVICE: HCPCS | Performed by: INTERNAL MEDICINE

## 2018-01-07 PROCEDURE — 85520 HEPARIN ASSAY: CPT

## 2018-01-07 PROCEDURE — 80048 BASIC METABOLIC PNL TOTAL CA: CPT

## 2018-01-07 PROCEDURE — 700111 HCHG RX REV CODE 636 W/ 250 OVERRIDE (IP): Performed by: INTERNAL MEDICINE

## 2018-01-07 PROCEDURE — 700102 HCHG RX REV CODE 250 W/ 637 OVERRIDE(OP): Performed by: THORACIC SURGERY (CARDIOTHORACIC VASCULAR SURGERY)

## 2018-01-07 PROCEDURE — 700102 HCHG RX REV CODE 250 W/ 637 OVERRIDE(OP): Performed by: INTERNAL MEDICINE

## 2018-01-07 PROCEDURE — A9270 NON-COVERED ITEM OR SERVICE: HCPCS | Performed by: THORACIC SURGERY (CARDIOTHORACIC VASCULAR SURGERY)

## 2018-01-07 PROCEDURE — A9270 NON-COVERED ITEM OR SERVICE: HCPCS | Performed by: NURSE PRACTITIONER

## 2018-01-07 PROCEDURE — A9270 NON-COVERED ITEM OR SERVICE: HCPCS | Performed by: CLINICAL NURSE SPECIALIST

## 2018-01-07 PROCEDURE — 700102 HCHG RX REV CODE 250 W/ 637 OVERRIDE(OP): Performed by: NURSE PRACTITIONER

## 2018-01-07 PROCEDURE — 82962 GLUCOSE BLOOD TEST: CPT | Mod: 91

## 2018-01-07 PROCEDURE — 770022 HCHG ROOM/CARE - ICU (200)

## 2018-01-07 PROCEDURE — 700102 HCHG RX REV CODE 250 W/ 637 OVERRIDE(OP): Performed by: CLINICAL NURSE SPECIALIST

## 2018-01-07 PROCEDURE — 94003 VENT MGMT INPAT SUBQ DAY: CPT

## 2018-01-07 PROCEDURE — 85027 COMPLETE CBC AUTOMATED: CPT

## 2018-01-07 PROCEDURE — 71045 X-RAY EXAM CHEST 1 VIEW: CPT

## 2018-01-07 PROCEDURE — 302172 SOFT BED BELT: Performed by: INTERNAL MEDICINE

## 2018-01-07 RX ORDER — CAPTOPRIL 25 MG/1
25 TABLET ORAL EVERY 8 HOURS
Status: DISCONTINUED | OUTPATIENT
Start: 2018-01-07 | End: 2018-01-08

## 2018-01-07 RX ORDER — SPIRONOLACTONE 25 MG/1
50 TABLET ORAL
Status: DISCONTINUED | OUTPATIENT
Start: 2018-01-08 | End: 2018-01-10 | Stop reason: HOSPADM

## 2018-01-07 RX ORDER — HALOPERIDOL 5 MG/ML
2 INJECTION INTRAMUSCULAR ONCE
Status: COMPLETED | OUTPATIENT
Start: 2018-01-07 | End: 2018-01-07

## 2018-01-07 RX ORDER — ALPRAZOLAM 0.5 MG/1
0.5 TABLET ORAL EVERY 8 HOURS PRN
Status: DISCONTINUED | OUTPATIENT
Start: 2018-01-07 | End: 2018-01-10 | Stop reason: HOSPADM

## 2018-01-07 RX ADMIN — DIGOXIN 250 MCG: 125 TABLET ORAL at 17:20

## 2018-01-07 RX ADMIN — NYSTATIN 500000 UNITS: 100000 SUSPENSION ORAL at 08:42

## 2018-01-07 RX ADMIN — NYSTATIN 500000 UNITS: 100000 SUSPENSION ORAL at 21:14

## 2018-01-07 RX ADMIN — FAMOTIDINE 20 MG: 20 TABLET, FILM COATED ORAL at 08:43

## 2018-01-07 RX ADMIN — NYSTATIN 500000 UNITS: 100000 SUSPENSION ORAL at 13:33

## 2018-01-07 RX ADMIN — ASPIRIN 81 MG: 81 TABLET, CHEWABLE ORAL at 08:43

## 2018-01-07 RX ADMIN — ENOXAPARIN SODIUM 120 MG: 150 INJECTION SUBCUTANEOUS at 21:16

## 2018-01-07 RX ADMIN — CAPTOPRIL 25 MG: 25 TABLET ORAL at 23:29

## 2018-01-07 RX ADMIN — QUETIAPINE FUMARATE 50 MG: 25 TABLET ORAL at 21:13

## 2018-01-07 RX ADMIN — DIPHENHYDRAMINE HCL 25 MG: 25 TABLET ORAL at 03:27

## 2018-01-07 RX ADMIN — POTASSIUM BICARBONATE 50 MEQ: 25 TABLET, EFFERVESCENT ORAL at 13:33

## 2018-01-07 RX ADMIN — INSULIN HUMAN 5 UNITS: 100 INJECTION, SUSPENSION SUBCUTANEOUS at 23:17

## 2018-01-07 RX ADMIN — CHLORHEXIDINE GLUCONATE 15 ML: 1.2 RINSE ORAL at 21:14

## 2018-01-07 RX ADMIN — ENOXAPARIN SODIUM 150 MG: 150 INJECTION SUBCUTANEOUS at 08:40

## 2018-01-07 RX ADMIN — CHLORHEXIDINE GLUCONATE 15 ML: 1.2 RINSE ORAL at 08:42

## 2018-01-07 RX ADMIN — NYSTATIN 500000 UNITS: 100000 SUSPENSION ORAL at 17:20

## 2018-01-07 RX ADMIN — HALOPERIDOL LACTATE 2 MG: 5 INJECTION, SOLUTION INTRAMUSCULAR at 23:19

## 2018-01-07 RX ADMIN — DIPHENHYDRAMINE HCL 25 MG: 25 TABLET ORAL at 21:14

## 2018-01-07 RX ADMIN — QUETIAPINE FUMARATE 50 MG: 25 TABLET ORAL at 13:54

## 2018-01-07 RX ADMIN — AMIODARONE HYDROCHLORIDE 400 MG: 200 TABLET ORAL at 08:43

## 2018-01-07 RX ADMIN — CEFTRIAXONE 2 G: 2 INJECTION, POWDER, FOR SOLUTION INTRAMUSCULAR; INTRAVENOUS at 08:42

## 2018-01-07 RX ADMIN — POTASSIUM BICARBONATE 50 MEQ: 25 TABLET, EFFERVESCENT ORAL at 05:38

## 2018-01-07 RX ADMIN — ALPRAZOLAM 0.5 MG: 0.5 TABLET ORAL at 02:41

## 2018-01-07 RX ADMIN — INSULIN HUMAN 5 UNITS: 100 INJECTION, SUSPENSION SUBCUTANEOUS at 13:54

## 2018-01-07 RX ADMIN — FAMOTIDINE 20 MG: 20 TABLET, FILM COATED ORAL at 21:15

## 2018-01-07 RX ADMIN — FUROSEMIDE 40 MG: 10 INJECTION, SOLUTION INTRAMUSCULAR; INTRAVENOUS at 17:20

## 2018-01-07 RX ADMIN — CAPTOPRIL 25 MG: 25 TABLET ORAL at 13:54

## 2018-01-07 RX ADMIN — WARFARIN SODIUM 5 MG: 5 TABLET ORAL at 17:20

## 2018-01-07 RX ADMIN — ROSUVASTATIN CALCIUM 40 MG: 10 TABLET, FILM COATED ORAL at 21:13

## 2018-01-07 RX ADMIN — SPIRONOLACTONE 25 MG: 25 TABLET, FILM COATED ORAL at 08:43

## 2018-01-07 RX ADMIN — ALPRAZOLAM 0.5 MG: 0.5 TABLET ORAL at 17:25

## 2018-01-07 RX ADMIN — QUETIAPINE FUMARATE 50 MG: 25 TABLET ORAL at 08:43

## 2018-01-07 RX ADMIN — FUROSEMIDE 40 MG: 10 INJECTION, SOLUTION INTRAMUSCULAR; INTRAVENOUS at 05:38

## 2018-01-07 RX ADMIN — POTASSIUM BICARBONATE 50 MEQ: 25 TABLET, EFFERVESCENT ORAL at 23:19

## 2018-01-07 RX ADMIN — AMIODARONE HYDROCHLORIDE 400 MG: 200 TABLET ORAL at 21:14

## 2018-01-07 RX ADMIN — POTASSIUM BICARBONATE 50 MEQ: 25 TABLET, EFFERVESCENT ORAL at 17:20

## 2018-01-07 NOTE — WOUND TEAM
[facility]  Inpatient Services  Initial Wound and Skin Care Evaluation    Admission Date:  12/19/17     HPI, PMH, SH: Reviewed  Unit where seen by Wound Team:  [facility]     WOUND CONSULT RELATED TO:  Right buttocks      SUBJECTIVE:  Trached       Self Report / Pain Level:  Discomfort with turning      OBJECTIVE:  Patient in bed, asked by RN to look at wound     WOUND TYPE, LOCATION, CHARACTERISTICS (Pressure ulcers: location, stage, POA or date identified)    Location and type of wound:  Right buttocks, partial thickness wound         Periwound:  Scattered blanchable red/pink areas     Drainage:   Scant, SS    Tissue Type and %:  100% pink/yellow    Wound Edges:  Attached    Odor:    None    Exposed structure(s): None   S&S of Infection:  None       Measurements:  (Taken 01/07/18)   Length:   1 cm    Width:    1 cm    Depth:    0.5 cm     INTERVENTIONS BY WOUND TEAM:  Patient turned with RN, RN brought camera and wound was photographed.  Patient having small formed smears.  Likely able to keep Mepilex in place without constantly being soiled.  Mepilex placed and patient repositioned.     Interdisciplinary consultation:  RN, patient      EVALUATION:  Patient with small wound to right buttocks, appears friction/moisture related.  Incontinence has lessened and patient has been able to ask for bedpan more frequently.       Factors affecting wound healing:  Moisture, friction, CAD, hypoxia      Goals:  Decrease in wound size by 1% each week.      NURSING PLAN OF CARE ORDERS (X):    Dressing changes: See Dressing Maintenance orders: X  Skin care: See Skin Care orders: X  Rectal tube care: See Rectal Tube Care orders:   Other orders:    RSKIN: CURRENT (X) ORDERED (O)  Q shift Kirit:  X  Q shift pressure point assessments:  X  Pressure redistribution mattress        JOHN    X  Bariatric JOHN      Bariatric foam        Heel float boots       Heels floated on pillows    X  Barrier wipes      Barrier Cream       Barrier paste      Sacral silicone dressing    X  Silicone O2 tubing      Anchorfast      Trach with Optifoam split foam       Waffle cushion      Rectal tube or BMS      Antifungal tx    Turn q 2 hours   X  Up to chair     Ambulate   PT/OT     Dietician      PO     TF X  TPN     PVN    NPO   # days   Other       WOUND TEAM PLAN OF CARE (X):   NPWT change 3 x week:        Dressing changes by wound team:       Follow up as needed:     X  Other (explain):    Anticipated discharge plans (X):  SNF:         LTAC  Home Care:           Outpatient Wound Center:            Self Care:            Other:

## 2018-01-07 NOTE — PROGRESS NOTES
Assumed care of patient. Report given bedside by Aide COPELAND. Patient in bed attached to all monitoring devices. Educated on safety measures and plan of  Care. Nodded head as understanding. Will continue to reinforce. VSS. Will continue to closely monitor.

## 2018-01-07 NOTE — PROGRESS NOTES
Patient very calm. I educated patient about necessity of equipment (lines/tubes). Patient mouthed and nodded appropriately that he would not pull at anything. Patient had not been agitated or aggressive since 0830. MD and bedside RN discontinued wrist restraints. Wife at bedside to reinforce education

## 2018-01-07 NOTE — PROGRESS NOTES
Patient calm and alert during entire shift. Patient has not attempted to pull any lines or tubes. Restraints remain discontinued

## 2018-01-07 NOTE — CARE PLAN
Problem: Post Op Day 4 CABG/Heart Valve Replacement  Goal: Optimal care of the Post Op CABG/Heart Valve replacement Post Op Day 4    Intervention: Daily Weights  Done.  Intervention: Shower daily and clean incisions twice daily with soap and water  Done.   Intervention: Up in chair for all meals  Unable to preform. Patient generalized weakness.  Intervention: Ambulate, increasing the distance each time x 3 and before bed  Done.   Intervention: IS q 1 hour while awake and record best IS volume  Done.

## 2018-01-07 NOTE — DIETARY
WEEKLY TF UPDATE - TF via Cortrak: Peptamen Intense VHP @ 85 ml/hr, providing 2040 kcals, 188 grams protein, 1714 mL free water per day. TF running @ goal. RN states pt is tolerating TF. Pt remains on the vent. Current TF is meeting pt's estimated nutritional needs and remains appropriate.     Pertinent Labs: Na 147, Glucose 109, BUN 54, POC glucose 95, 118, 135  Pertinent Meds: cordarone, capoten, rocephin, peridex, lanoxin, colace, lovenox, pepcid, lasix, humalog, humulin, coumadin, klyte, seroquel, crestor, perioclace, aldactone, precedex (paused)  Fluids: No IV fluids noted in MAR  GI: Last BM 1/7  Wt 1/7: 156.3 kg via bed scale, wt fluctuation noted. Suspect related to fluids Per I/Os pt -7.5 L   SKIN: surgical incision left upper leg;groin, surgical incision chest, surgical incision left lower knee;leg, wound skin tear right medial buttock    PLAN/RECOMMEND - Continue current TF regimen with no changes. Fluids per MD. Weekly weights to monitor fluid and nutrition status.     RD following.

## 2018-01-07 NOTE — PROGRESS NOTES
Cardiology Progress Note               Author: Jerry Reyes Date & Time created: 2018  11:05 AM     Interval History:  Continues to do well off pressors  Tolerating captopril        Chief Complaint:  CHF  CAD    Review of Systems   Unable to perform ROS: Intubated       Physical Exam   Constitutional: He appears well-developed and well-nourished. No distress.   HENT:   Head: Normocephalic.   Mouth/Throat: Oropharynx is clear and moist.   Eyes: EOM are normal. Pupils are equal, round, and reactive to light. Right eye exhibits no discharge. Left eye exhibits no discharge. No scleral icterus.   Neck: Normal range of motion. Neck supple. No JVD present. No tracheal deviation present.   Cardiovascular: Normal rate, regular rhythm, S1 normal, S2 normal, normal heart sounds, intact distal pulses and normal pulses.  Exam reveals no gallop, no S3, no S4 and no friction rub.    No murmur heard.   No systolic murmur is present    No diastolic murmur is present   Pulses:       Carotid pulses are 2+ on the right side, and 2+ on the left side.       Radial pulses are 2+ on the right side, and 2+ on the left side.        Dorsalis pedis pulses are 2+ on the right side, and 2+ on the left side.        Posterior tibial pulses are 2+ on the right side, and 2+ on the left side.   Pulmonary/Chest: Effort normal and breath sounds normal. No respiratory distress. He has no wheezes. He has no rales.   Abdominal: Soft. Bowel sounds are normal. He exhibits no distension and no mass. There is no tenderness. There is no rebound and no guarding.   Musculoskeletal: He exhibits no edema.   Neurological: He is alert. No cranial nerve deficit.   Skin: Skin is warm and dry. He is not diaphoretic. No pallor.   Nursing note and vitals reviewed.      Hemodynamics:  Temp (24hrs), Av.3 °C (99.1 °F), Min:37.1 °C (98.8 °F), Max:37.4 °C (99.3 °F)  Temperature: 37.2 °C (99 °F)  Pulse  Av.3  Min: 0  Max: 221Heart Rate (Monitored):  91  NIBP:  (patient removed cuff)    Respiratory:  Leo Vent Mode: APVCMV, Rate (breaths/min): 26, PEEP/CPAP: 10, FiO2: 30, P Peak (PIP): 27, P MEAN: 17 Respiration: (!) 26, Pulse Oximetry: 96 %     Work Of Breathing / Effort: Vented  RUL Breath Sounds: Coarse Crackles, RML Breath Sounds: Coarse Crackles, RLL Breath Sounds: Diminished, GENNARO Breath Sounds: Coarse Crackles, LLL Breath Sounds: Diminished  Fluids:  Date 01/07/18 0700 - 01/08/18 0659   Shift 1446-1007 2249-1150 6517-8017 24 Hour Total   I  N  T  A  K  E   Other 180   180    Enteral 550   550    Shift Total 730   730   O  U  T  P  U  T   Urine 800   800    Shift Total 800   800   Weight (kg) 156.3 156.3 156.3 156.3       Weight: (!) 156.3 kg (344 lb 9.3 oz)  GI/Nutrition:  No orders of the defined types were placed in this encounter.    Lab Results:  Recent Labs      01/05/18   0510  01/06/18   0610  01/07/18   0245   WBC  22.6*  17.5*  16.0*   RBC  2.98*  3.22*  3.30*   HEMOGLOBIN  7.0*  7.6*  7.6*   HEMATOCRIT  25.6*  27.6*  28.0*   MCV  85.9  85.7  84.8   MCH  23.5*  23.6*  23.0*   MCHC  27.3*  27.5*  27.1*   RDW  66.4*  66.1*  65.1*   PLATELETCT  526*  540*  538*   MPV  11.1  10.5  10.5     Recent Labs      01/05/18   0510  01/06/18   0610  01/07/18   0245   SODIUM  148*  149*  147*   POTASSIUM  4.0  4.5  4.4   CHLORIDE  113*  110  109   CO2  28  33  33   GLUCOSE  129*  130*  109*   BUN  47*  49*  54*   CREATININE  1.14  1.15  1.21   CALCIUM  7.5*  8.1*  8.2*     Recent Labs      01/06/18   1130   INR  1.20*             Recent Labs      01/05/18   0510   TRIGLYCERIDE  93         Medical Decision Making, by Problem:  Active Hospital Problems    Diagnosis   • CAD (coronary artery disease) [I25.10]   • CHF (congestive heart failure) (CMS-[facility]) [I50.9]       Plan:  51 yo M with CAD s/p CABG x 2, unable to bypass RCA, CHFrEF from ICM, EF 25-30%. Titrate captopril today.  I will also increase his shirley to 50 daily.  If he gets hypotension from too much  diuretic, stop lasix before spironolactone.     1. CAD s/p CABG    - cont asa    - cont rosuva to 40     2. CHFrEF, EF 25-30%    - cont dig 0.25 daily    - hold beta blockers for 2 weeks, restart after 1/20/2018, contraindicated due to B agonist use    - cont lasix to 40 IV BID    - increase spironolactone to 50 mg daily    - increase captopril to 25 TID     3. HLD    - per above     4. Hypotension, shock    -resolving     5. WBC/Resp failure    - defer      Reviewed items::  EKG reviewed, Radiology images reviewed, Labs reviewed and Medications reviewed  Ceballos catheter::  Critically Ill - Requiring Accurate Measurement of Urinary Output  DVT prophylaxis pharmacological::  Heparin  DVT prophylaxis - mechanical:  SCDs  Ulcer Prophylaxis::  No

## 2018-01-07 NOTE — PROGRESS NOTES
Pulmonary Critical Care Progress Note      DOS:  1/7/18, admit 12/19/2017    Chief Complaint: CAD, 2V CABG    History of Present Illness: 51 y/o M, h/o DM, COPD, DLD, CAD, SHASHANK, Obesity; underwent 2v CABG 12/22    Interval Events:  24 hour interval history reviewed   Reason for visit:  A&C respiratory failure, CAD s/p CABG, AF/flut, Low K/Mg, S/p trach 12/31    Less agitated after Seroquel  TF goal  UO adequate  Tm 99.3  EOB 17  Min  AF - flut - 90s  SBp 90s  Trach ok  PEEP 10 - 30%  4 hrs SBT  Full dose lovenox  Anti -10A levels came back a bit high  Ceftriaxone    D/w daughter at bedside again     YESTERDAY  Agitated last night  DEX resumed  Restrained again  QTc?  TF goal  UO good  Afebrile  EOB/chair  PEEP 10 35%  RSBI 70s  Min clear  Secretions  Full dose Lovenox  Ceftriaxone 2/7  IV lasix ongoing    EKG with QTC measuring 438  Seroquel started  EKG     YESTERDAY  AFib/flut - 70-80s  Off DEX  Xanax  CPAP trials ongoing, intermittently has high R SBI and has to be switched back to full support  Klebsiella oxytoca that is resistant only to ampicillin is growing in BAL  Ancef 10/10  Tm 38.6  WBC improving down from 27.9-22.6  Change to C3 - 7 days     YESTERDAY  S/p thoracentesis for > 1000cc yesteday  Tx/Dx FOB yesterday - BAL sent, some plugs LLL cleared  Follows  Confused/impulsive  DEX 0.6  Xanax pewr [facility]  EPI 0.04  AF 70-100s  Labile Bp  NE stopped yesterday  TF goal  Great UO  I/Os neg 1160/24 hrs  Tm 38.5 - WBC still up  SBT failed - RSBI > 120 few minutes  Full dose Lovenox  Ancef 9/10  Micro GNR in BAL?  Pleural neg stain     YESTERDAY  Mildly confused - follows  Amio  Back in AF 120s  KRISTIN off  EPI 0.03  DEX 0.7  FENT 50 -> off now  EOB  L thora pending  Lovensox on hold  PEEP 8 and fiO2 increased to 0.5  Lots secretions  Ancef  8/10    Serial follow-up evaluations performed  Thoracentesis attempted by radiology, unsuccessful  Interventional radiologist requested to perform thoracentesis and successfully  performed late afternoon with over 1000 cc removed, studies pending    Low-grade fevers throughout the day, worsening oxygenation requiring PEEP to be increased back from 8-10 words been the last several days. Perhaps increased secretions from tracheostomy tube.  Reviewed with family at bedside, diagnostic and therapeutic bronchoscopy to be performed    Hemodynamics remain okay, blood pressure a little soft/slightly low at times, may be related to blood pressure cuff     YESTERDAY  Sedate on vent - follows some  Trach ok  SBT 1 hr RSBI 59  SBT 1 hr x2 yesterday  CXR better  - less eff/atx on R - L effusion worse?  DEX 0.8  Fent 100  Afib/flutter - rate 70-80s  CVP 7-12  Lasix drip -> Lasix 80 BID IV  Amio to PO  KRISTIN 10  EPI 0.05  Tm 38.3  TF goal   UO adequate - I/Os neg 856  Full dose lovenox - last dose this AM  Ancef 7/10    D/w family at bedside     YESTERDAY  Sedate on vent  Follows some - less agitated  AF/flut - HR 80s  Bp ok on pressors  Tm 37.9  Trach not bleeding  KRISTIN 50  EPI 0.03  Amio 0.5  Fent 100  Prop 20  Lasix 10/hr drip  Full dose Lovenox  Ancef 6/10  K 3.5  Check Mg  CXR wet/BSSLLA - no change vs old film  Therapies    YESTERDAY   - AF/flutter, , amio .5   - epi, kristin gtts   - lasix gtt   - I/O 4.7/6.5   - vent day 9, min secretions   - cxr unchagned   - CTs out     - Lovenox 150 bid, held for trach    - day 5 of 10 Ancef for MSSA, kleb BAL   - Cr up 1.28   - perc trach today  Yesterday   - POD8   - arouses, follows, restless, prop 60   - AF/flutter, 120's   - amiodarone, gtt .5   - epi gtt, kristin gtt   - lasix gtt 10/hr   - williams TF at 80   - good UOP, I/O 5.5/7.8   - Tm 38.3   - full dose lovenox   - RUE DVT   - vent day 9, 8, 40%; no SBT, cxr edema, atx unchanged   - ancef day 2 for kleb/mssa pna   - will need trach  :  Review of Systems   Unable to perform ROS: Acuity of condition       Physical Exam   Constitutional: He appears well-developed and well-nourished.   Remains confused but  follows, since of humor may be returning   HENT:   Head: Normocephalic and atraumatic.   Mouth/Throat: Oropharynx is clear and moist.   Eyes: Pupils are equal, round, and reactive to light. No scleral icterus.   Neck: Neck supple. No JVD present. No tracheal deviation present.   Cardiovascular: Normal rate, normal heart sounds and intact distal pulses.  Exam reveals no gallop and no friction rub.    No murmur heard.  Pulmonary/Chest: No respiratory distress. He has rales.   Diminished at bases improved after both thoracentesis and therapeutic bronchoscopy, scattered coarse crackles, no wheezes - better on R, trach site okay, secretions from trach with suctioning.   Abdominal: Soft. He exhibits no distension. There is no tenderness. There is no rebound and no guarding.   Musculoskeletal: He exhibits edema. He exhibits no deformity.   Trace edema   Neurological: No cranial nerve deficit.   following some commands intermittently, moves all extremities, still weak diffusely   Skin: Skin is warm and dry.   Psychiatric:   Sedate, mood better after adding Seroquel   Nursing note and vitals reviewed.     No change      PFSH:  No change.    Respiratory:  Leo Vent Mode: APVCMV, Rate (breaths/min): 26, Vt Target (mL): 540, PEEP/CPAP: 10, FiO2: 30, Static Compliance (ml / cm H2O): 52, Control VTE (exp VT): 553  Pulse Oximetry: 92 %    Ventilator mechanics and waveforms are reviewed at the bedside with RT again                    Invalid input(s): [id number]    HemoDynamics:  Pulse: 80, Heart Rate (Monitored): 79  NIBP: 104/66  CVP (mm Hg): (!) 7 MM HG (zeroed and calibrated)            Neuro:  GCS Total [place] Coma Score: 11         Fluids:  Intake/Output       01/05/18 0700 - 01/06/18 0659 01/06/18 0700 - 01/07/18 0659 01/07/18 0700 - 01/08/18 0659      2422-9839 9216-5935 Total 9479-1321 5442-5963 Total 4776-9134 3066-6322 Total       Intake    I.V.  264.4  265.5 529.8  173.3  40 213.3  --  -- --    Precedex Volume  44.4 145.5 189.8 53.3 -- 53.3 -- -- --    IV Piggyback Volume 100 -- 100 -- -- -- -- -- --    NS  120 240 120 40 160 -- -- --    Other  520  260 780  580  160 740  --  -- --    Medications (P.O./ Enteral Liquids) 520 260 780 580 160 740 -- -- --    Enteral  1120  1200 2320  1380  940 2320  --  -- --    Enteral Volume 850 1020 1870 9929 047 9075 -- -- --    Free Water / Tube Flush 270 180 450 360 90 450 -- -- --    Total Intake 1904.4 1725.5 3629.8 2133.3 1140 3273.3 -- -- --       Output    Urine    1295 3275    1475 3575  --  -- --    Indwelling Cathether  1295 3275 2100 600 2700 -- -- --    Void (ml) -- -- -- -- 875 875 -- -- --    Stool  --  -- --  --  -- --  --  -- --    Number of Times Stooled 3 x 2 x 5 x 1 x 1 x 2 x -- -- --    Total Output  1295 3275  1475 3575 -- -- --       Net I/O     -75.7 430.5 354.8 33.3 -335 -301.8 -- -- --          Recent Labs      18   0245   SODIUM  148*  149*  147*   POTASSIUM  4.0  4.5  4.4   CHLORIDE  113*  110  109   CO2  28  33  33   BUN  47*  49*  54*   CREATININE  1.14  1.15  1.21   CALCIUM  7.5*  8.1*  8.2*       GI/Nutrition:  TF goal  Liver Function  Recent Labs      18   0245   GLUCOSE  129*  130*  109*       Heme:  Recent Labs      1810  18   1130  18   0245   RBC  2.98*  3.22*   --   3.30*   HEMOGLOBIN  7.0*  7.6*   --   7.6*   HEMATOCRIT  25.6*  27.6*   --   28.0*   PLATELETCT  526*  540*   --   538*   PROTHROMBTM   --    --   14.9*   --    INR   --    --   1.20*   --        Infectious Disease:  Temp  Av.3 °C (99.1 °F)  Min: 37.1 °C (98.8 °F)  Max: 37.4 °C (99.3 °F)  Micro: reviewed  Recent Labs      18   0510  18   0610  18   0245   WBC  22.6*  17.5*  16.0*     Current Facility-Administered Medications   Medication Dose Frequency Provider Last Rate Last Dose   • spironolactone (ALDACTONE) tablet 25 mg   25 mg Q DAY Jerry Reyes M.D.       • captopril (CAPOTEN) tablet 12.5 mg  12.5 mg Q8HRS Jerry Reyes M.D.   Stopped at 01/06/18 1400   • MD TRUONG... warfarin (COUMADIN) per pharmacy protocol   pharmacy to dose Silvio Eller M.D.       • quetiapine (SEROQUEL) tablet 50 mg  50 mg TID Silvio Eller M.D.   50 mg at 01/06/18 2049   • warfarin (COUMADIN) tablet 5 mg  5 mg COUMADIN-DAILY Silvio Eller M.D.   5 mg at 01/06/18 1804   • furosemide (LASIX) injection 40 mg  40 mg BID DIURETIC Jerry Reyes M.D.   40 mg at 01/07/18 0538   • cefTRIAXone (ROCEPHIN) syringe 2 g  2 g Q24HRS Silvio Eller M.D.   2 g at 01/06/18 0837   • alprazolam (XANAX) tablet 0.5 mg  0.5 mg TID PRN Monet Antonio, A.P.N.   0.5 mg at 01/07/18 0241   • nystatin (MYCOSTATIN) 726427 UNIT/ML suspension 500,000 Units  5 mL 4X/DAY Silvio Eller M.D.   500,000 Units at 01/06/18 2048   • enoxaparin (LOVENOX) injection 150 mg  150 mg Q12HRS Kali Evans Jr., D.O.   150 mg at 01/06/18 2050   • rosuvastatin (CRESTOR) tablet 40 mg  40 mg Q EVENING Jerry Reyes M.D.   40 mg at 01/06/18 2048   • digoxin (LANOXIN) tablet 250 mcg  250 mcg DAILY AT 1800 Jerry Reyes M.D.   250 mcg at 01/06/18 1744   • dexmedetomidine (PRECEDEX) 400 mcg/100mL premix infusion  0.1-1.5 mcg/kg/hr Continuous Jerry Reyes M.D.   Stopped at 01/06/18 0920   • amiodarone (CORDARONE) tablet 400 mg  400 mg TWICE DAILY Gui Delcid M.D.   400 mg at 01/06/18 2048   • docusate sodium 100mg/10mL (COLACE) solution 100 mg  100 mg DAILY Silvia Preston, A.P.N.   Stopped at 01/05/18 0900    Or   • docusate sodium (COLACE) capsule 100 mg  100 mg DAILY Silvia Preston, A.P.N.   100 mg at 01/02/18 0831   • potassium bicarbonate (KLYTE) 25 MEQ effervescent tablet TBEF 50 mEq  50 mEq Q6HRS Amrik Banegas M.D.   50 mEq at 01/07/18 0538   • aspirin (ASA) chewable tab 81 mg  81 mg DAILY Kiel Ash M.D.   81 mg at 01/06/18 0835    • insulin NPH (HUMULIN,NOVOLIN) injection 5 Units  5 Units Q8HRS Silvia Preston A.P.N.   5 Units at 01/06/18 2115   • famotidine (PEPCID) tablet 20 mg  20 mg BID Rodolfo Landa M.D.   20 mg at 01/06/18 2049   • chlorhexidine (PERIDEX) 0.12 % solution 15 mL  15 mL BID Rodolfo Landa M.D.   15 mL at 01/06/18 2048   • insulin lispro (HUMALOG) injection 0-15 Units  0-15 Units Q6HRS SUSI Alfred.P.N.   Stopped at 01/07/18 0000   • Respiratory Care per Protocol   Continuous RT Silvia Preston A.P.N.       • Pharmacy Consult Request ...Pain Management Review 1 Each  1 Each PRN Silvia Preston A.P.N.       • senna-docusate (PERICOLACE or SENOKOT S) 8.6-50 MG per tablet 1 Tab  1 Tab Nightly Silvia Preston A.P.N.   1 Tab at 01/06/18 2048    And   • senna-docusate (PERICOLACE or SENOKOT S) 8.6-50 MG per tablet 1 Tab  1 Tab Q24HRS PRN Silvia Preston A.P.N.   1 Tab at 12/28/17 0732    And   • lactulose 20 GM/30ML solution 30 mL  30 mL Q24HRS PRN Silvia Preston A.P.N.   30 mL at 01/03/18 0912    And   • bisacodyl (DULCOLAX) suppository 10 mg  10 mg Q24HRS PRN Silvia Preston, A.P.N.   10 mg at 01/03/18 0912    And   • fleet enema 133 mL  1 Each Once PRN Silvia Preston A.P.N.       • oxycodone immediate release (ROXICODONE) tablet 5 mg  5 mg Q3HRS PRN Silvia Preston A.P.N.   5 mg at 01/05/18 1722   • oxycodone immediate release (ROXICODONE) tablet 10 mg  10 mg Q3HRS PRN Silvia Preston A.P.N.   10 mg at 01/05/18 1416   • morphine (pf) 4 mg/ml injection 4 mg  4 mg Q3HRS PRN Silvia Preston, A.P.N.   4 mg at 01/06/18 0907   • tramadol (ULTRAM) 50 MG tablet 50 mg  50 mg Q4HRS PRN Silvia Preston, A.P.N.   50 mg at 01/06/18 2049   • morphine (pf) 4 mg/ml injection 4 mg  4 mg Q HOUR PRN Silvia Preston, A.P.N.   4 mg at 01/05/18 2200   • ondansetron (ZOFRAN) syringe/vial injection 4 mg  4 mg Q6HRS PRN Silvia Preston, A.P.N.        Or   • prochlorperazine (COMPAZINE) injection 10 mg  10 mg Q6HRS PRN Silvia Preston, A.P.N.         Or   • promethazine (PHENERGAN) suppository 25 mg  25 mg Q6HRS PRN Silvia Preston, A.P.N.       • acetaminophen (TYLENOL) tablet 650 mg  650 mg Q4HRS PRN Silvia Preston, A.P.N.   650 mg at 01/01/18 1840    Or   • acetaminophen (TYLENOL) suppository 650 mg  650 mg Q4HRS PRN Silvia Preston, A.P.N.       • mag hydrox-al hydrox-simeth (MAALOX PLUS ES or MYLANTA DS) suspension 30 mL  30 mL Q4HRS PRN Silvia Preston, A.P.N.   30 mL at 01/01/18 0753   • diphenhydrAMINE (BENADRYL) tablet/capsule 25 mg  25 mg HS PRN - MR X 1 Silvia Preston, A.P.N.   25 mg at 01/07/18 0327   • albuterol inhaler 2 Puff  2 Puff Q4H PRN (RT) Kiel Ash M.D.       • ipratropium-albuterol (DUONEB) nebulizer solution 3 mL  3 mL Q4H PRN (RT) Kiel Ash M.D.   3 mL at 12/23/17 1154     Last reviewed on 12/22/2017  7:07 AM by Katie Aly R.N.    Quality  Measures:  Labs reviewed, Medications reviewed and Radiology images reviewed                      Assessment/Plan:  Status post 2-vessel coronary artery bypass grafting on 12/22/2017.   - Status post IABP    - EPI drip weaned off AM 1/4   - chest tubes out   - off Lasix drip, monitor volume status and hemodynamics closely - on IV Lasix   - Postoperative brief cardiac arrest, now awake and following/sedated  Acute hypoxic respiratory failure secondary to above.   - intubated 12/22   - Continue full ventilator support today, SBTs as c linically appropriate   - s/p trach 12/31   - serial ABG/CXR and vent mechanics review   - LTACH eval   - sedation vacations - try to swap Prop to DEX - lighten sedation   - Mobilize when safe as tolerated  ABN CXR - bilateral effusion/atx    - L effuison worse - L thoracentesis -1/3   - BAL 1/3 fluid growing Klebsiella oxytoca, will treat with 7 days ceftriaxone - stopped 1/12   - Serial chest x-ray to eval for the need for repeat bronchoscopy or thoracentesis   - Aggressive pulmonary toilet and mobilize as tolerated to help prevent  above  AF/RVR   - Amiodarone   - Digoxin   - Anti-coag   - ERP keep K > 4 and Mg > 2  Coronary artery disease with multivessel disease - ASA/full antiocoag  Pneumonia   - MSSA and Klebsiella identified   - Antibiotics de-escalated to Ancef, continue ×10 days - stop 1/5  Cardiomyopathy EF 35% and global systolic dysfunction - ionotropes/forced diuresis as needed  Severely dilated right ventricle - caution with volume excess  Chronic obstructive pulmonary disease - RT protocosl  Reported diabetes.   - ssi/NPH and q6 FS - adjust dosing daily at Team rounds  Dyslipidemia - statin  Clinically with obstructive sleep apnea - slow removal of trach - PSG later?  LTACH consult pending - ready - early next week?  Agitation secondary to above - Seroquel trial/titrate dose over next several days - trying to stay off intravenous sedation in any form to facilitate rehabilitation   - add IV Haldol PRN vs Xanax low dose  Prophylaxis - ABCDEF bundle    Discussed patient condition and risk of morbidity and/or mortality with Family, RN, RT, Pharmacy, Charge nurse / hot rounds and [facility].

## 2018-01-07 NOTE — CARE PLAN
Problem: Ventilation Defect:  Goal: Ability to achieve and maintain unassisted ventilation or tolerate decreased levels of ventilator support  Outcome: PROGRESSING AS EXPECTED  Adult Ventilation Update    Total Vent Days: 17    Patient Lines/Drains/Airways Status    Active Airway     Name: Placement date: Placement time: Site: Days:    Airway Group Standard Cuffed Trach Tracheostomy 8.0 12/31/17   1106   Tracheostomy   7              Plateau Pressure (Q Shift): 24 (01/06/18 0630)  Static Compliance (ml / cm H2O): 52 (01/07/18 0222)    Sputum/Suction   Cough: Productive (01/07/18 0222)  Sputum Amount: Moderate (01/07/18 0222)  Sputum Color: Yellow;White (01/07/18 0222)  Sputum Consistency: Thick;Thin (01/07/18 0222)    Events/Summary/Plan: Pt stable on vent over night as charted, no changes made.

## 2018-01-07 NOTE — PROGRESS NOTES
Cardiovascular Surgery Progress Note    Name: Joshua Medrano  MRN: [id number]  : 1965  Admit Date: 2017 10:21 AM  Procedure:  Procedure(s) and Anesthesia Type:     * MULTIPLE CORONARY ARTERY BYPASS ENDO VEIN HARVEST X2 - General     * JIM - General  16 Day Post-Op    Vitals:  Patient Vitals for the past 8 hrs:   Temp SpO2 O2 Delivery Pulse Heart Rate (Monitored) Resp NIBP Weight   18 0800 37.2 °C (99 °F) - - 100 90 (!) 24 121/54 -   18 0745 - 99 % - - 87 - - -   18 0600 - 94 % Ventilator 70 84 (!) 26 106/50 (!) 156.3 kg (344 lb 9.3 oz)   18 0500 - 93 % - 90 94 (!) 21 107/64 -   18 0400 37.3 °C (99.1 °F) 92 % Ventilator 80 79 (!) 21 104/66 -   18 0300 - 99 % - 69 76 (!) 26 100/52 -   18 0222 - 93 % - - 89 - - -     Temp (24hrs), Av.3 °C (99.1 °F), Min:37.1 °C (98.8 °F), Max:37.4 °C (99.3 °F)      Respiratory:  Leo Vent Mode: APVCMV, Rate (breaths/min): 26, PEEP/CPAP: 10, FiO2: 30, P Peak (PIP): 27, P MEAN: 17 Respiration: (!) 24, Pulse Oximetry: 99 %     Chest Tube Drains:          Fluids:    Intake/Output Summary (Last 24 hours) at 18 1009  Last data filed at 18 0800   Gross per 24 hour   Intake             3240 ml   Output             3875 ml   Net             -635 ml     Admit weight: Weight: (!) 159.2 kg (350 lb 15.6 oz)  Current weight: Weight: (!) 156.3 kg (344 lb 9.3 oz) (18 0600)    Labs:  Recent Labs      18   0510  18   0610  18   0245   WBC  22.6*  17.5*  16.0*   RBC  2.98*  3.22*  3.30*   HEMOGLOBIN  7.0*  7.6*  7.6*   HEMATOCRIT  25.6*  27.6*  28.0*   MCV  85.9  85.7  84.8   MCH  23.5*  23.6*  23.0*   MCHC  27.3*  27.5*  27.1*   RDW  66.4*  66.1*  65.1*   PLATELETCT  526*  540*  538*   MPV  11.1  10.5  10.5         Recent Labs      18   0510  18   0610  18   0245   SODIUM  148*  149*  147*   POTASSIUM  4.0  4.5  4.4   CHLORIDE  113*  110  109   CO2  28  33  33   GLUCOSE  129*  130*  109*    BUN  47*  49*  54*   CREATININE  1.14  1.15  1.21   CALCIUM  7.5*  8.1*  8.2*     Recent Labs      01/06/18   1130   INR  1.20*       Medications:  • spironolactone  25 mg     • captopril  12.5 mg     • MD ALERT... warfarin       • quetiapine  50 mg     • warfarin  5 mg     • furosemide  40 mg     • cefTRIAXone (ROCEPHIN) IV  2 g     • nystatin  5 mL     • enoxaparin (LOVENOX) injection  150 mg     • rosuvastatin  40 mg     • digoxin  250 mcg     • amiodarone  400 mg     • docusate sodium 100mg/10mL  100 mg      Or   • docusate sodium  100 mg     • potassium bicarbonate  50 mEq     • aspirin  81 mg     • insulin NPH  5 Units     • famotidine  20 mg     • chlorhexidine  15 mL     • insulin lispro  0-15 Units     • senna-docusate  1 Tab         Exam:   Review of Systems   Unable to perform ROS: Intubated       Physical Exam   Constitutional: No distress. He is intubated.   Morbidly obese   Neck: Neck supple.   Cardiovascular: Normal rate and regular rhythm.  Exam reveals no gallop.    No murmur heard.  Pulmonary/Chest: Effort normal. He is intubated. No respiratory distress. He has decreased breath sounds in the right lower field and the left lower field.   Abdominal: Soft. He exhibits no distension.   Musculoskeletal: He exhibits edema.   Neurological:   sedated   Skin: Skin is warm.   Psychiatric:   JUAREZ       Quality Measures:     Reviewed items::  EKG reviewed, Labs reviewed, Medications reviewed and Radiology images reviewed  Jenkins catheter::  One or Two Days Post Surgery (Day of Surgery being Day 0) and Critically Ill - Requiring Accurate Measurement of Urinary Output  Central line in place:  Need for access and Vasopressors  DVT prophylaxis pharmacological::  Contraindicated - High bleeding risk  DVT prophylaxis - mechanical:  SCDs  Ulcer Prophylaxis::  Yes      Assessment/Plan:  POD 1 - HOTN- IABP 1:2, epi/jazz, vent- peep inc this am, keep CT/jenkins, CPM  POD 2 HOTN - epi/jazz- IABP 1:2, Vent - pulm following,  s/p PEA arrest yesterday- bronch with lots of thick secretions, sedated, CPM  POD 3 - HOTN - epi/jazz- decreased from yesterday, IABP 1:2, Vent - CMV 26, PEEP 10, sedated, diurese well yesterday- start lasix gtt today, CPM  POD 4 NSR, Hotn-epi/jazz, down slightly form yesterday.  IABP 1:2 no change in pressures with pump on standby--change to 1:4.  On lasix gtt, diuresing well.  FiO2 down to 40%, PEEP 10.  Continue lasix gtt.  Will plan on removing IABP today. CPM.   POD 5 HDS, SB- d/c amio, epi/jazz- weaning, Vent- per pulm, sedated when awake follows per RN, lasix gtt- cont, CPM  POD 6 HDS, NSR, epi/jazz--weaning.  On lasix gtt.  WBCs up, bronch yesterday now on ABX.  Mod r effusion.  Will tap.  Keep SERGE drain L thigh one more day.Keep CTs while vented.  CPM.  POD 7 HDS, on low dose epi, off jazz.  NSR. RUE thrombus started on heparin gtt--held this AM for thoracentesis.  Vent 40%/peep 8.  Sedation weaning.  PLAN:  DC temp wires, resume anticoagulation after thoracentesis.  Vent per pulm. Keep CTs today.  POD 8 Remains on epi, no change.  Back in afib yesterday, rebolused amio, now AT vs a flutter?  Cardiology to address. On lovenox.  CXR remains with edema/effusions, unable to perform thoracentesis yesterday. Keep lasix gtt today and k-scale.   Agitated, kept on sedation.  Vent per PMA.  May need to move toward trach next few days.  Plan to hold lovenox in AM to DC CTs and SERGE drain.  DC prevena, DC staples EVH site. CPM.  POD 9 Epi/jazz/lasix, ST, VDRF, sedated.  Lasix gtt.  PLAN: Vent per PMA,  Lovenox held for trach likely today.  DC SERGE drain.  CPM.   POD 10 Epi/jazz/lasix, s/p trach, sedated but more alert/cooperative this AM.  Some serosanguinous oozing SERGE exit site.  Echo today per cardiology. CPM.  POD 11 Still on epi and jazz drips.  Awake alert.  Follows commands.  Awaiting echo.  Once off drips to LTAC for rehab.  Moderate left pleural effusion.  Will order bedside tap.   POD 12 Paroxysmal afib, anticoagulated on  lovenox, cards following.  Off jazz, low dose epi only.  More alert today, follows, CRYSTAL. Thoracentesis planned for today.  CPM.  Push PT/OT.  POD 13 Paroxysmal afib, back on epi.  1000ml off L thoracentesis yesterday and s/p bronch by pulmonary.  To cardiac chair today.  Patient anxious at night, on precedex. Add PRN xanax, wean off precedex. Wean off epi as able.  CPM.    POD 14  HDS, afib rate controlled, neuro intact on precedex drip., wounds CDI, abdomin S/NT, fluid balance negative, wt stable.  Off pressors.  Awake alert.  Plan:  Start low dose  BB, no ACE HOTN, Statin, IS/ambulate. CPM.  POD 15 HDS, no pressors, leg ozzing from drain site.  Alert.  Follows commands.  Plan:  Wrap leg with ace.  To LTAC soon.  Wean vent as tolerated.    POD 16 HDS, SR, leg continues to ozz moderate amount.  Alert, follows commands.  Fluid balance negative 10 liters.  Plan:  CPM.  To LTAC once authorized.     Active Hospital Problems    Diagnosis   • CAD (coronary artery disease) [I25.10]   • CHF (congestive heart failure) (CMS-HCC) [I50.9]

## 2018-01-07 NOTE — PROGRESS NOTES
Inpatient Anticoagulation Service Note    Date: 1/7/2018  Reason for Anticoagulation: Atrial Fibrillation, Deep Vein Thrombosis   Hemoglobin Value: (!) 7.6  Hematocrit Value: (!) 28  Lab Platelet Value: (!) 538  Target INR: 2.0 to 3.0    INR from last 7 days     Date/Time INR Value    01/07/18 1334 (!)  1.25    01/06/18 1130 (!)  1.2        Dose from last 7 days     Date/Time Dose (mg)    01/07/18 1400  5 mg PO daily    01/06/18 1600  5        Average Dose (mg):  (new start)  Significant Interactions: Aspirin, Amiodarone  Bridge Therapy: Yes (Lovenox 120 mg SC BID)  Date of Last VTE Event: 12/28/18  Bridge Therapy Start Date: 01/06/18  Days of Overlap Therapy: 1    Comments: Just started on warfarin yesterday, wouldn't expect increase in INR yet. No other changes. Continue same. Daily INR. Anti-Xa level high this morning, Lovenox dose reduced accordingly.    Education Material Provided?: No  Pharmacist suggested discharge dosing: BRAYAN Aceves, PharmD, BCPS

## 2018-01-08 ENCOUNTER — APPOINTMENT (OUTPATIENT)
Dept: RADIOLOGY | Facility: MEDICAL CENTER | Age: 53
DRG: 003 | End: 2018-01-08
Attending: INTERNAL MEDICINE
Payer: MEDICARE

## 2018-01-08 LAB
ANION GAP SERPL CALC-SCNC: 9 MMOL/L (ref 0–11.9)
BACTERIA SPEC ANAEROBE CULT: NORMAL
BUN SERPL-MCNC: 48 MG/DL (ref 8–22)
CALCIUM SERPL-MCNC: 8.2 MG/DL (ref 8.5–10.5)
CHLORIDE SERPL-SCNC: 109 MMOL/L (ref 96–112)
CO2 SERPL-SCNC: 28 MMOL/L (ref 20–33)
CREAT SERPL-MCNC: 1.08 MG/DL (ref 0.5–1.4)
CRP SERPL HS-MCNC: 5.82 MG/DL (ref 0–0.75)
ERYTHROCYTE [DISTWIDTH] IN BLOOD BY AUTOMATED COUNT: 68.9 FL (ref 35.9–50)
GFR SERPL CREATININE-BSD FRML MDRD: >60 ML/MIN/1.73 M 2
GLUCOSE BLD-MCNC: 110 MG/DL (ref 65–99)
GLUCOSE BLD-MCNC: 125 MG/DL (ref 65–99)
GLUCOSE BLD-MCNC: 128 MG/DL (ref 65–99)
GLUCOSE SERPL-MCNC: 106 MG/DL (ref 65–99)
HCT VFR BLD AUTO: 28.3 % (ref 42–52)
HGB BLD-MCNC: 7.7 G/DL (ref 14–18)
INR PPP: 1.23 (ref 0.87–1.13)
MCH RBC QN AUTO: 23.5 PG (ref 27–33)
MCHC RBC AUTO-ENTMCNC: 27.2 G/DL (ref 33.7–35.3)
MCV RBC AUTO: 86.3 FL (ref 81.4–97.8)
PLATELET # BLD AUTO: 558 K/UL (ref 164–446)
PMV BLD AUTO: 11 FL (ref 9–12.9)
POTASSIUM SERPL-SCNC: 4.3 MMOL/L (ref 3.6–5.5)
PREALB SERPL-MCNC: 10 MG/DL (ref 18–38)
PROTHROMBIN TIME: 15.2 SEC (ref 12–14.6)
RBC # BLD AUTO: 3.28 M/UL (ref 4.7–6.1)
SIGNIFICANT IND 70042: NORMAL
SITE SITE: NORMAL
SODIUM SERPL-SCNC: 146 MMOL/L (ref 135–145)
SOURCE SOURCE: NORMAL
TRIGL SERPL-MCNC: 94 MG/DL (ref 0–149)
WBC # BLD AUTO: 16 K/UL (ref 4.8–10.8)

## 2018-01-08 PROCEDURE — 85027 COMPLETE CBC AUTOMATED: CPT

## 2018-01-08 PROCEDURE — A9270 NON-COVERED ITEM OR SERVICE: HCPCS | Performed by: CLINICAL NURSE SPECIALIST

## 2018-01-08 PROCEDURE — A9270 NON-COVERED ITEM OR SERVICE: HCPCS | Performed by: INTERNAL MEDICINE

## 2018-01-08 PROCEDURE — 84134 ASSAY OF PREALBUMIN: CPT

## 2018-01-08 PROCEDURE — 86140 C-REACTIVE PROTEIN: CPT

## 2018-01-08 PROCEDURE — 700102 HCHG RX REV CODE 250 W/ 637 OVERRIDE(OP): Performed by: INTERNAL MEDICINE

## 2018-01-08 PROCEDURE — 700112 HCHG RX REV CODE 229: Performed by: CLINICAL NURSE SPECIALIST

## 2018-01-08 PROCEDURE — 94003 VENT MGMT INPAT SUBQ DAY: CPT

## 2018-01-08 PROCEDURE — 700101 HCHG RX REV CODE 250: Performed by: THORACIC SURGERY (CARDIOTHORACIC VASCULAR SURGERY)

## 2018-01-08 PROCEDURE — 700102 HCHG RX REV CODE 250 W/ 637 OVERRIDE(OP): Performed by: CLINICAL NURSE SPECIALIST

## 2018-01-08 PROCEDURE — A9270 NON-COVERED ITEM OR SERVICE: HCPCS | Performed by: THORACIC SURGERY (CARDIOTHORACIC VASCULAR SURGERY)

## 2018-01-08 PROCEDURE — 82962 GLUCOSE BLOOD TEST: CPT

## 2018-01-08 PROCEDURE — 94640 AIRWAY INHALATION TREATMENT: CPT

## 2018-01-08 PROCEDURE — 700102 HCHG RX REV CODE 250 W/ 637 OVERRIDE(OP): Performed by: THORACIC SURGERY (CARDIOTHORACIC VASCULAR SURGERY)

## 2018-01-08 PROCEDURE — 700111 HCHG RX REV CODE 636 W/ 250 OVERRIDE (IP): Performed by: INTERNAL MEDICINE

## 2018-01-08 PROCEDURE — 84478 ASSAY OF TRIGLYCERIDES: CPT

## 2018-01-08 PROCEDURE — 770022 HCHG ROOM/CARE - ICU (200)

## 2018-01-08 PROCEDURE — 85610 PROTHROMBIN TIME: CPT

## 2018-01-08 PROCEDURE — 71045 X-RAY EXAM CHEST 1 VIEW: CPT

## 2018-01-08 PROCEDURE — 80048 BASIC METABOLIC PNL TOTAL CA: CPT

## 2018-01-08 RX ORDER — ASPIRIN 81 MG/1
81 TABLET, CHEWABLE ORAL DAILY
Qty: 100 TAB
Start: 2018-01-09 | End: 2018-03-01 | Stop reason: SDUPTHER

## 2018-01-08 RX ORDER — QUETIAPINE FUMARATE 50 MG/1
50 TABLET, FILM COATED ORAL 3 TIMES DAILY
Qty: 60 TAB | Refills: 3 | Status: ON HOLD | OUTPATIENT
Start: 2018-01-08 | End: 2018-02-01

## 2018-01-08 RX ORDER — LISINOPRIL 10 MG/1
10 TABLET ORAL
Status: DISCONTINUED | OUTPATIENT
Start: 2018-01-08 | End: 2018-01-10 | Stop reason: HOSPADM

## 2018-01-08 RX ORDER — WARFARIN SODIUM 5 MG/1
5 TABLET ORAL
Status: DISCONTINUED | OUTPATIENT
Start: 2018-01-09 | End: 2018-01-09

## 2018-01-08 RX ORDER — FUROSEMIDE 10 MG/ML
40 INJECTION INTRAMUSCULAR; INTRAVENOUS 2 TIMES DAILY
Refills: 0 | Status: ON HOLD
Start: 2018-01-08 | End: 2018-02-01

## 2018-01-08 RX ORDER — AMIODARONE HYDROCHLORIDE 400 MG/1
400 TABLET ORAL 2 TIMES DAILY
Qty: 30 TAB | Status: ON HOLD
Start: 2018-01-08 | End: 2018-02-01

## 2018-01-08 RX ORDER — DIGOXIN 250 MCG
250 TABLET ORAL DAILY
Qty: 30 TAB | Status: ON HOLD
Start: 2018-01-08 | End: 2018-02-01

## 2018-01-08 RX ORDER — WARFARIN SODIUM 7.5 MG/1
7.5 TABLET ORAL
Status: COMPLETED | OUTPATIENT
Start: 2018-01-08 | End: 2018-01-08

## 2018-01-08 RX ORDER — ROSUVASTATIN CALCIUM 40 MG/1
40 TABLET, COATED ORAL EVERY EVENING
Qty: 30 TAB | Refills: 11 | Status: ON HOLD | OUTPATIENT
Start: 2018-01-08 | End: 2018-02-01

## 2018-01-08 RX ORDER — WARFARIN SODIUM 5 MG/1
5 TABLET ORAL
Qty: 30 TAB | Refills: 3 | Status: ON HOLD
Start: 2018-01-08 | End: 2018-02-01

## 2018-01-08 RX ORDER — SPIRONOLACTONE 50 MG/1
50 TABLET, FILM COATED ORAL DAILY
Qty: 30 TAB | Refills: 3 | Status: ON HOLD | OUTPATIENT
Start: 2018-01-09 | End: 2018-02-01

## 2018-01-08 RX ORDER — CAPTOPRIL 25 MG/1
25 TABLET ORAL EVERY 8 HOURS
Qty: 90 TAB | Status: ON HOLD
Start: 2018-01-08 | End: 2018-02-01

## 2018-01-08 RX ORDER — FAMOTIDINE 20 MG/1
20 TABLET, FILM COATED ORAL 2 TIMES DAILY
Qty: 60 TAB | Status: ON HOLD
Start: 2018-01-08 | End: 2018-02-01

## 2018-01-08 RX ORDER — IPRATROPIUM BROMIDE AND ALBUTEROL SULFATE 2.5; .5 MG/3ML; MG/3ML
3 SOLUTION RESPIRATORY (INHALATION) EVERY 4 HOURS PRN
Qty: 30 BULLET | Status: ON HOLD
Start: 2018-01-08 | End: 2018-02-01

## 2018-01-08 RX ADMIN — CHLORHEXIDINE GLUCONATE 15 ML: 1.2 RINSE ORAL at 07:49

## 2018-01-08 RX ADMIN — FAMOTIDINE 20 MG: 20 TABLET, FILM COATED ORAL at 07:49

## 2018-01-08 RX ADMIN — POTASSIUM BICARBONATE 50 MEQ: 25 TABLET, EFFERVESCENT ORAL at 22:47

## 2018-01-08 RX ADMIN — QUETIAPINE FUMARATE 50 MG: 25 TABLET ORAL at 07:49

## 2018-01-08 RX ADMIN — INSULIN HUMAN 5 UNITS: 100 INJECTION, SUSPENSION SUBCUTANEOUS at 20:29

## 2018-01-08 RX ADMIN — ROSUVASTATIN CALCIUM 40 MG: 10 TABLET, FILM COATED ORAL at 20:13

## 2018-01-08 RX ADMIN — ENOXAPARIN SODIUM 120 MG: 150 INJECTION SUBCUTANEOUS at 09:00

## 2018-01-08 RX ADMIN — AMIODARONE HYDROCHLORIDE 400 MG: 200 TABLET ORAL at 07:50

## 2018-01-08 RX ADMIN — FUROSEMIDE 40 MG: 10 INJECTION, SOLUTION INTRAMUSCULAR; INTRAVENOUS at 05:51

## 2018-01-08 RX ADMIN — QUETIAPINE FUMARATE 50 MG: 25 TABLET ORAL at 20:14

## 2018-01-08 RX ADMIN — CEFTRIAXONE 2 G: 2 INJECTION, POWDER, FOR SOLUTION INTRAMUSCULAR; INTRAVENOUS at 08:18

## 2018-01-08 RX ADMIN — POTASSIUM BICARBONATE 50 MEQ: 25 TABLET, EFFERVESCENT ORAL at 17:11

## 2018-01-08 RX ADMIN — WARFARIN SODIUM 7.5 MG: 7.5 TABLET ORAL at 17:11

## 2018-01-08 RX ADMIN — FAMOTIDINE 20 MG: 20 TABLET, FILM COATED ORAL at 20:14

## 2018-01-08 RX ADMIN — CAPTOPRIL 25 MG: 25 TABLET ORAL at 05:50

## 2018-01-08 RX ADMIN — POTASSIUM BICARBONATE 50 MEQ: 25 TABLET, EFFERVESCENT ORAL at 05:51

## 2018-01-08 RX ADMIN — NYSTATIN 500000 UNITS: 100000 SUSPENSION ORAL at 12:14

## 2018-01-08 RX ADMIN — NYSTATIN 500000 UNITS: 100000 SUSPENSION ORAL at 07:50

## 2018-01-08 RX ADMIN — DIGOXIN 250 MCG: 125 TABLET ORAL at 17:19

## 2018-01-08 RX ADMIN — LISINOPRIL 10 MG: 10 TABLET ORAL at 15:16

## 2018-01-08 RX ADMIN — CHLORHEXIDINE GLUCONATE 15 ML: 1.2 RINSE ORAL at 20:13

## 2018-01-08 RX ADMIN — QUETIAPINE FUMARATE 50 MG: 25 TABLET ORAL at 15:15

## 2018-01-08 RX ADMIN — DIPHENHYDRAMINE HCL 25 MG: 25 TABLET ORAL at 19:57

## 2018-01-08 RX ADMIN — INSULIN HUMAN 5 UNITS: 100 INJECTION, SUSPENSION SUBCUTANEOUS at 05:51

## 2018-01-08 RX ADMIN — AMIODARONE HYDROCHLORIDE 400 MG: 200 TABLET ORAL at 20:13

## 2018-01-08 RX ADMIN — INSULIN HUMAN 5 UNITS: 100 INJECTION, SUSPENSION SUBCUTANEOUS at 15:15

## 2018-01-08 RX ADMIN — ALPRAZOLAM 0.5 MG: 0.5 TABLET ORAL at 18:19

## 2018-01-08 RX ADMIN — NYSTATIN 500000 UNITS: 100000 SUSPENSION ORAL at 20:13

## 2018-01-08 RX ADMIN — POTASSIUM BICARBONATE 50 MEQ: 25 TABLET, EFFERVESCENT ORAL at 12:14

## 2018-01-08 RX ADMIN — ALPRAZOLAM 0.5 MG: 0.5 TABLET ORAL at 05:51

## 2018-01-08 RX ADMIN — DOCUSATE SODIUM 100 MG: 50 LIQUID ORAL at 08:18

## 2018-01-08 RX ADMIN — NYSTATIN 500000 UNITS: 100000 SUSPENSION ORAL at 17:11

## 2018-01-08 RX ADMIN — ASPIRIN 81 MG: 81 TABLET, CHEWABLE ORAL at 07:53

## 2018-01-08 RX ADMIN — SPIRONOLACTONE 50 MG: 25 TABLET, FILM COATED ORAL at 07:49

## 2018-01-08 ASSESSMENT — ENCOUNTER SYMPTOMS
CONSTITUTIONAL NEGATIVE: 1
CARDIOVASCULAR NEGATIVE: 1
RESPIRATORY NEGATIVE: 1
EYES NEGATIVE: 1
GASTROINTESTINAL NEGATIVE: 1
NEUROLOGICAL NEGATIVE: 1
PSYCHIATRIC NEGATIVE: 1
MUSCULOSKELETAL NEGATIVE: 1

## 2018-01-08 NOTE — CARE PLAN
Problem: Safety  Goal: Will remain free from falls  Outcome: PROGRESSING AS EXPECTED  Pt attempting to get out of bed. Bed alarm on. Frequently repositioning pt and educating on importance of staying in bed.     Problem: Mobility  Goal: Risk for activity intolerance will decrease  Outcome: PROGRESSING AS EXPECTED  Mobilized patient to edge of bed. Pt very week needing 3 person assist to get to edge of bed. Tires quickly.

## 2018-01-08 NOTE — CARE PLAN
Problem: Safety  Goal: Will remain free from injury  Outcome: PROGRESSING SLOWER THAN EXPECTED  Patient needs constant reminding of safety measures, safe transfer and mobilizations techniques in use     Problem: Pain Management  Goal: Pain level will decrease to patient's comfort goal  Outcome: PROGRESSING AS EXPECTED  Patient's pain is well controlled, patient denies pain during this shift, patient uses 1-10 rating scale to rate pain

## 2018-01-08 NOTE — PROGRESS NOTES
Cardiology Progress Note               Author: John Escalante MD Date & Time created: 2018  12:21 PM     Interval History:  Continues to do well off pressors  Awaiting LTAC placement  Reports no pain by nodding his head        Chief Complaint:  CHF  CAD    Review of Systems   Unable to perform ROS: Intubated       Physical Exam   Constitutional: He appears well-developed and well-nourished. No distress.   HENT:   Head: Normocephalic.   Mouth/Throat: Oropharynx is clear and moist.   Eyes: EOM are normal. Pupils are equal, round, and reactive to light. Right eye exhibits no discharge. Left eye exhibits no discharge. No scleral icterus.   Neck: Normal range of motion. Neck supple. No JVD present. No tracheal deviation present.   Tracheal site c/d/i   Cardiovascular: Normal rate, regular rhythm, S1 normal, S2 normal, normal heart sounds, intact distal pulses and normal pulses.  Exam reveals no gallop, no S3, no S4 and no friction rub.    No murmur heard.   No systolic murmur is present    No diastolic murmur is present   Pulses:       Radial pulses are 2+ on the right side, and 2+ on the left side.        Dorsalis pedis pulses are 2+ on the right side, and 2+ on the left side.   Pulmonary/Chest: Effort normal and breath sounds normal. No respiratory distress. He has no wheezes. He has no rales.   Abdominal: Soft. Bowel sounds are normal. He exhibits no distension and no mass. There is no tenderness. There is no rebound and no guarding.   Musculoskeletal: He exhibits no edema.   Neurological: He is alert. No cranial nerve deficit.   Skin: Skin is warm and dry. He is not diaphoretic. No pallor.   Nursing note and vitals reviewed.      Hemodynamics:  Temp (24hrs), Av °C (98.6 °F), Min:36.9 °C (98.4 °F), Max:37.1 °C (98.8 °F)  Temperature: 37 °C (98.6 °F)  Pulse  Av.5  Min: 0  Max: 221Heart Rate (Monitored): 90  Blood Pressure: 106/57, NIBP: 109/73    Respiratory:  Leo Vent Mode: Spont, Rate (breaths/min):  26, PEEP/CPAP: 8, FiO2: 30, P Peak (PIP): 19, P MEAN: 11 Respiration: (!) 21, Pulse Oximetry: 98 %     Work Of Breathing / Effort: Vented  RUL Breath Sounds: Coarse Crackles, RML Breath Sounds: Coarse Crackles, RLL Breath Sounds: Coarse Crackles, GENNARO Breath Sounds: Coarse Crackles, LLL Breath Sounds: Coarse Crackles  Fluids:  Date 01/07/18 0700 - 01/08/18 0659   Shift 4957-0884 7122-8583 2956-2608 24 Hour Total   I  N  T  A  K  E   Other 180   180    Enteral 550   550    Shift Total 730   730   O  U  T  P  U  T   Urine 800   800    Shift Total 800   800   Weight (kg) 156.3 156.3 156.3 156.3       Weight: (!) 155.1 kg (341 lb 14.9 oz)  GI/Nutrition:  No orders of the defined types were placed in this encounter.    Lab Results:  Recent Labs      01/06/18   0610  01/07/18   0245  01/08/18   0423   WBC  17.5*  16.0*  16.0*   RBC  3.22*  3.30*  3.28*   HEMOGLOBIN  7.6*  7.6*  7.7*   HEMATOCRIT  27.6*  28.0*  28.3*   MCV  85.7  84.8  86.3   MCH  23.6*  23.0*  23.5*   MCHC  27.5*  27.1*  27.2*   RDW  66.1*  65.1*  68.9*   PLATELETCT  540*  538*  558*   MPV  10.5  10.5  11.0     Recent Labs      01/06/18   0610  01/07/18   0245  01/08/18   0423   SODIUM  149*  147*  146*   POTASSIUM  4.5  4.4  4.3   CHLORIDE  110  109  109   CO2  33  33  28   GLUCOSE  130*  109*  106*   BUN  49*  54*  48*   CREATININE  1.15  1.21  1.08   CALCIUM  8.1*  8.2*  8.2*     Recent Labs      01/06/18   1130  01/07/18   1334  01/08/18   0423   INR  1.20*  1.25*  1.23*             Recent Labs      01/08/18   0423   TRIGLYCERIDE  94         Medical Decision Making, by Problem:  Active Hospital Problems    Diagnosis   • CAD (coronary artery disease) [I25.10]   • CHF (congestive heart failure) (CMS-[facility]) [I50.9]       Plan:  53 yo M with CAD s/p CABG x 2, unable to bypass RCA, CHFrEF from ICM, EF 25-30%. To transfer to LTAC.      1. CAD s/p CABG    - cont asa    - cont rosuva to 40     2. CHFrEF, EF 25-30%    - cont dig 0.25 daily    - hold beta blockers  for 2 weeks, restart after 1/20/2018, contraindicated due to B agonist use    - Lasix to 40 PO BID, watch daily weights, increase lasix as needed if weight increases at all or increased LE swelling.    - spironolactone to 50 mg daily    - switch to lisinopril 10mg PO daily, uptitrate as outpatient.      3. HLD    - per above     4. Hypotension, shock    -resolved     5. WBC/Resp failure    - defer    Discussed above recommendations with Dr. Small from ICU team.  Cardiology will now sign off.  Will schedule cardiology follow-up in 1-2 weeks.       Reviewed items::  EKG reviewed, Radiology images reviewed, Labs reviewed and Medications reviewed  Ceballos catheter::  Critically Ill - Requiring Accurate Measurement of Urinary Output  DVT prophylaxis pharmacological::  Heparin  DVT prophylaxis - mechanical:  SCDs  Ulcer Prophylaxis::  No

## 2018-01-08 NOTE — DISCHARGE PLANNING
Received transportation request from IFRAH Warren for patient to transfer to [facility] via [facility]. Call placed to [employer] with [facility], and transport time arranged for 1600.     MTM : [id number]     IFRAH Warren has been notified via phone.

## 2018-01-08 NOTE — DISCHARGE SUMMARY
CHIEF COMPLAINT ON ADMISSION  Shortness of breath    CODE STATUS  Full Code    HPI    This is a 52 y.o. male here with CAD and reduced ejection fraction. Admitted for diuresis followed by CABG.    HOSPITAL COURSE  POD 1 - HOTN- IABP 1:2, epi/jazz, vent- peep inc this am, keep CT/jenkins, CPM  POD 2 HOTN - epi/jazz- IABP 1:2, Vent - pulm following, s/p PEA arrest yesterday- bronch with lots of thick secretions, sedated, CPM  POD 3 - HOTN - epi/jazz- decreased from yesterday, IABP 1:2, Vent - CMV 26, PEEP 10, sedated, diurese well yesterday- start lasix gtt today, CPM  POD 4 NSR, Hotn-epi/jazz, down slightly form yesterday.  IABP 1:2 no change in pressures with pump on standby--change to 1:4.  On lasix gtt, diuresing well.  FiO2 down to 40%, PEEP 10.  Continue lasix gtt.  Will plan on removing IABP today. CPM.   POD 5 HDS, SB- d/c amio, epi/jazz- weaning, Vent- per pulm, sedated when awake follows per RN, lasix gtt- cont, CPM  POD 6 HDS, NSR, epi/jazz--weaning.  On lasix gtt.  WBCs up, bronch yesterday now on ABX.  Mod r effusion.  Will tap.  Keep SERGE drain L thigh one more day.Keep CTs while vented.  CPM.  POD 7 HDS, on low dose epi, off jazz.  NSR. RUE thrombus started on heparin gtt--held this AM for thoracentesis.  Vent 40%/peep 8.  Sedation weaning.  PLAN:  DC temp wires, resume anticoagulation after thoracentesis.  Vent per pulm. Keep CTs today.  POD 8 Remains on epi, no change.  Back in afib yesterday, rebolused vinnyo, now AT vs a flutter?  Cardiology to address. On lovenox.  CXR remains with edema/effusions, unable to perform thoracentesis yesterday. Keep lasix gtt today and k-scale.   Agitated, kept on sedation.  Vent per PMA.  May need to move toward trach next few days.  Plan to hold lovenox in AM to DC CTs and SERGE drain.  DC prevena, DC staples EVH site. CPM.  POD 9 Epi/jazz/lasix, ST, VDRF, sedated.  Lasix gtt.  PLAN: Vent per PMA,  Lovenox held for trach likely today.  ROBERTA SERGE drain.  CPM.   POD 10 Epi/jazz/lasix, s/p  trach, sedated but more alert/cooperative this AM.  Some serosanguinous oozing SERGE exit site.  Echo today per cardiology. CPM.  POD 11 Still on epi and jazz drips.  Awake alert.  Follows commands.  Awaiting echo.  Once off drips to LTAC for rehab.  Moderate left pleural effusion.  Will order bedside tap.   POD 12 Paroxysmal afib, anticoagulated on lovenox, cards following.  Off jazz, low dose epi only.  More alert today, follows, ROJAS. Thoracentesis planned for today.  CPM.  Push PT/OT.  POD 13 Paroxysmal afib, back on epi.  1000ml off L thoracentesis yesterday and s/p bronch by pulmonary.  To cardiac chair today.  Patient anxious at night, on precedex. Add PRN xanax, wean off precedex. Wean off epi as able.  CPM.    POD 14  HDS, afib rate controlled, neuro intact on precedex drip., wounds CDI, abdomin S/NT, fluid balance negative, wt stable.  Off pressors.  Awake alert.  Plan:  Start low dose  BB, no ACE HOTN, Statin, IS/ambulate. CPM.  POD 15 HDS, no pressors, leg ozzing from drain site.  Alert.  Follows commands.  Plan:  Wrap leg with ace.  To LTAC soon.  Wean vent as tolerated.    POD 16 HDS, SR, leg continues to ozz moderate amount.  Alert, follows commands.  Fluid balance negative 10 liters.  Plan:  CPM.  To LTAC once authorized.   POD 17 HDS, SR, trach/vent,  leg incisions c/d/i, alert & follows, medically cleared for discharge today- plan to [facility] today    Therefore, he is discharged in guarded and stable condition with close outpatient follow-up.    PROCEDURES  12/22/17- Coronary artery bypass grafting x2 with left internal   mammary to left anterior descending and reverse saphenous vein graft to the   major obtuse marginal branch, temporary right ventricular and right atrial   pacing leads, extracorporeal circulation, endoscopic saphenous vein harvesting   and placement of intraaortic balloon pump     12/23/17- Emergent diagnostic and therapeutic bronchoscopy with aspiration and bal     12/27/17- Diagnostic and  therapeutic bronchoscopy with BAL    12/31/17- Percutaneous tracheostomy under bronchoscopic guidance    1/3/18- Therapeutic bronchoscopy with diagnostic bronchoalveolar lavage/bronchial lavage    DISCHARGE PROBLEM LIST  CAD-Status post 2-vessel coronary artery bypass grafting on 12/22/2017.   Acute hypoxic respiratory failure secondary to above.              - intubated 12/22,s/p trach 12/31  Bilateral effusion               -L thoracentesis -1/3              - BAL 1/3 fluid growing Klebsiella oxytoca, will treat with 7 days ceftriaxone - stopped 1/12   AF/RVR              - Amiodarone, Digoxin, full dose lovenox bridging until coumadin therapeutic  Pneumonia              - MSSA and Klebsiella identified  Cardiomyopathy EF 35% and global systolic dysfunction   Chronic obstructive pulmonary disease   Dyslipidemia - statin    FOLLOW UP  No future appointments.  Kiel Ash M.D.  [id number]  [address]  [facility] [address]  [phone]    On 1/22/2018  2:45 pm      MEDICATIONS ON DISCHARGE   Joshua Medrano   Home Medication Instructions ANGELITO:[id number]    Printed on:01/08/18 3548   Medication Information                      amiodarone (PACERONE) 400 MG tablet  Take 1 Tab by mouth 2 Times a Day. For 1 week, then daily             aspirin (ASA) 81 MG Chew Tab chewable tablet  Take 1 Tab by mouth every day.             captopril (CAPOTEN) 25 MG Tab  Take 1 Tab by mouth every 8 hours.             cefTRIAXone (ROCEPHIN)  20 mL by Intravenous route every 24 hours for 2 days.             digoxin (LANOXIN) 250 MCG Tab  Take 1 Tab by mouth every day at 6 PM.             enoxaparin (LOVENOX) 120 MG/0.8ML Solution inj  Inject 120 mg as instructed every 12 hours. Until INR >2             famotidine (PEPCID) 20 MG Tab  Take 1 Tab by mouth 2 Times a Day.             furosemide (LASIX) 10 MG/ML Solution  4 mL by Intravenous route 2 Times a Day.             ipratropium-albuterol (DUONEB) 0.5-2.5 (3) MG/3ML nebulizer solution  3 mL  by Nebulization route every four hours as needed for Shortness of Breath.             nystatin (MYCOSTATIN) 024947 UNIT/ML Suspension  Take 5 mL by mouth 4 times a day.             potassium bicarbonate (KLYTE) 25 MEQ tablet  Take 2 Tabs by mouth every 6 hours.             quetiapine (SEROQUEL) 50 MG tablet  Take 1 Tab by mouth 3 times a day.             rosuvastatin (CRESTOR) 40 MG tablet  Take 1 Tab by mouth every evening.             spironolactone (ALDACTONE) 50 MG Tab  Take 1 Tab by mouth every day.             warfarin (COUMADIN) 5 MG Tab  Take 1 Tab by mouth COUMADIN-DAILY. Titrate to INR 2-3                 DIET  Tubefeeds- Peptamen 80ml/hr    ACTIVITY  Per rehab MD  As tolerated.   Sternal precautions  10-lb lifting restriction, no pushing or pulling with arms      LABORATORY  Lab Results   Component Value Date/Time    SODIUM 146 (H) 01/08/2018 04:23 AM    POTASSIUM 4.3 01/08/2018 04:23 AM    CHLORIDE 109 01/08/2018 04:23 AM    CO2 28 01/08/2018 04:23 AM    GLUCOSE 106 (H) 01/08/2018 04:23 AM    BUN 48 (H) 01/08/2018 04:23 AM    CREATININE 1.08 01/08/2018 04:23 AM        Lab Results   Component Value Date/Time    WBC 16.0 (H) 01/08/2018 04:23 AM    HEMOGLOBIN 7.7 (L) 01/08/2018 04:23 AM    HEMATOCRIT 28.3 (L) 01/08/2018 04:23 AM    PLATELETCT 558 (H) 01/08/2018 04:23 AM

## 2018-01-08 NOTE — DISCHARGE PLANNING
Medical SW    Referral: Sw spoke to Obey Tee. She completed new [facility] form for tomorrow AM transport.    Intervention: This Sw has copy of [facility] form and Nay advises she cancelled [facility] transport for today as [facility] will try again tomorrow AM when they will have appropriate staff. Nay advised bedside RN as above.    Obey spoke to Dinorah w/ [facility] to advise as above. Sw will f/u tomorrow AM to set up transport.     Plan: Obey to assist w/ d/c planning as needed.

## 2018-01-08 NOTE — THERAPY
SPEECH PATHOLOGY: Spoke with RN this am and again this afternoon regarding orders received for swallow evaluation as patient is trached and on vent support.  Discussed recommendation for speaking valve order as patient is appropriate.  Spoke with RN again this afternoon, and she indicated MD wanting to hold off on speaking valve until patient is weaned from vent.  Will hold swallow evaluation for now.  Please order speaking valve as appropriate.  Patient may possibly transfer to [facility] tomorrow.  Patient does have cortrak for nutrition.  Thank you for the consult.

## 2018-01-08 NOTE — CARE PLAN
Problem: Ventilation Defect:  Goal: Ability to achieve and maintain unassisted ventilation or tolerate decreased levels of ventilator support  Outcome: PROGRESSING AS EXPECTED    Intervention: Support and monitor invasive and noninvasive mechanical ventilation  Adult Ventilation Update    Total Vent Days: 18  Trach day: 9    Patient Lines/Drains/Airways Status    Active Airway     Name: Placement date: Placement time: Site: Days:    Airway Group Standard Cuffed Trach Tracheostomy 8.0 12/31/17   1106   Tracheostomy   9              Vent settings: 26 540 30% 8    Sputum/Suction   Cough: Productive (01/08/18 0400)  Sputum Amount: Moderate (01/08/18 0400)  Sputum Color: White;Clear (01/08/18 0400)  Sputum Consistency: Thick;Thin (01/08/18 0400)        Events/Summary/Plan: Pt stable on vent, no changes made.

## 2018-01-08 NOTE — CARE PLAN
Problem: Fluid Volume:  Goal: Will maintain balanced intake and output  I and Os recorded, lasix given, VS monitored.

## 2018-01-08 NOTE — CARE PLAN
Problem: Ventilation Defect:  Goal: Ability to achieve and maintain unassisted ventilation or tolerate decreased levels of ventilator support    Intervention: Support and monitor invasive and noninvasive mechanical ventilation  Pt remains on CMV : 26/540/30%/8, Pt has #8 Portex trach. Vent day 17 trach day 8. Pt williams SBT x 3.5 hrs today then was placed back on CMV d/t fatigue and increased WOB.  PEEP decreased from 8 - 10 cmH2O today, williams well.

## 2018-01-08 NOTE — PROGRESS NOTES
Cardiovascular Surgery Progress Note    Name: Joshua Medrano  MRN: [id number]  : 1965  Admit Date: 2017 10:21 AM  Procedure:  Procedure(s) and Anesthesia Type:     * MULTIPLE CORONARY ARTERY BYPASS ENDO VEIN HARVEST X2 - General     * JIM - General  17 Day Post-Op    Vitals:  Patient Vitals for the past 8 hrs:   Temp SpO2 O2 Delivery Pulse Heart Rate (Monitored) Resp BP NIBP Weight   18 0750 - - - (!) 102 - - 106/57 - -   18 0640 - 100 % - - 83 - - - -   18 0634 - 96 % Ventilator - 83 - - - -   18 0600 - 98 % - 69 72 19 - 105/54 -   18 0500 - 96 % - 93 93 20 - 117/71 -   18 0400 37 °C (98.6 °F) 100 % Ventilator 66 72 (!) 26 - - (!) 155.1 kg (341 lb 14.9 oz)   18 0300 - 91 % - 84 83 (!) 29 - 100/63 -   18 0203 - 97 % - - 61 - - - -   18 0200 - 96 % - 61 61 (!) 27 - (!) 97/51 -   18 0100 - 100 % - 62 62 (!) 26 - 114/48 -     Temp (24hrs), Av °C (98.6 °F), Min:36.9 °C (98.4 °F), Max:37.1 °C (98.8 °F)      Respiratory:  Leo Vent Mode: Spont, Rate (breaths/min): 26, PEEP/CPAP: 8, FiO2: 30, P Peak (PIP): 16, P MEAN: 11 Respiration: 19, Pulse Oximetry: 100 %     Chest Tube Drains:          Fluids:    Intake/Output Summary (Last 24 hours) at 18 0836  Last data filed at 18 0600   Gross per 24 hour   Intake             2760 ml   Output             3900 ml   Net            -1140 ml     Admit weight: Weight: (!) 159.2 kg (350 lb 15.6 oz)  Current weight: Weight: (!) 155.1 kg (341 lb 14.9 oz) (18 0400)    Labs:  Recent Labs      18   0610  185  18   WBC  17.5*  16.0*  16.0*   RBC  3.22*  3.30*  3.28*   HEMOGLOBIN  7.6*  7.6*  7.7*   HEMATOCRIT  27.6*  28.0*  28.3*   MCV  85.7  84.8  86.3   MCH  23.6*  23.0*  23.5*   MCHC  27.5*  27.1*  27.2*   RDW  66.1*  65.1*  68.9*   PLATELETCT  540*  538*  558*   MPV  10.5  10.5  11.0         Recent Labs      18   0610  01/07/18   0245  01/08/18   0423    SODIUM  149*  147*  146*   POTASSIUM  4.5  4.4  4.3   CHLORIDE  110  109  109   CO2  33  33  28   GLUCOSE  130*  109*  106*   BUN  49*  54*  48*   CREATININE  1.15  1.21  1.08   CALCIUM  8.1*  8.2*  8.2*     Recent Labs      01/06/18   1130  01/07/18   1334  01/08/18   0423   INR  1.20*  1.25*  1.23*       Medications:  • enoxaparin (LOVENOX) injection  120 mg     • captopril  25 mg     • spironolactone  50 mg     • MD ALERT... warfarin       • quetiapine  50 mg     • warfarin  5 mg     • furosemide  40 mg     • cefTRIAXone (ROCEPHIN) IV  2 g     • nystatin  5 mL     • rosuvastatin  40 mg     • digoxin  250 mcg     • amiodarone  400 mg     • docusate sodium 100mg/10mL  100 mg      Or   • docusate sodium  100 mg     • potassium bicarbonate  50 mEq     • aspirin  81 mg     • insulin NPH  5 Units     • famotidine  20 mg     • chlorhexidine  15 mL     • insulin lispro  0-15 Units     • senna-docusate  1 Tab         Exam:   Review of Systems   Unable to perform ROS: Intubated       Physical Exam   Constitutional: No distress. He is intubated.   Morbidly obese   Neck: Neck supple.   Cardiovascular: Normal rate and regular rhythm.  Exam reveals no gallop.    No murmur heard.  Pulmonary/Chest: Effort normal. He is intubated. No respiratory distress. He has decreased breath sounds in the right lower field and the left lower field.   Abdominal: Soft. He exhibits no distension.   Musculoskeletal: He exhibits edema.   Neurological:   sedated   Skin: Skin is warm.   Psychiatric:   JUAREZ       Quality Measures:     Reviewed items::  EKG reviewed, Labs reviewed, Medications reviewed and Radiology images reviewed  Ceballos catheter::  One or Two Days Post Surgery (Day of Surgery being Day 0) and Critically Ill - Requiring Accurate Measurement of Urinary Output  Central line in place:  Need for access and Vasopressors  DVT prophylaxis pharmacological::  Contraindicated - High bleeding risk  DVT prophylaxis - mechanical:  SCDs  Ulcer  Prophylaxis::  Yes      Assessment/Plan:  POD 1 - HOTN- IABP 1:2, epi/jazz, vent- peep inc this am, keep CT/jenkins, CPM  POD 2 HOTN - epi/jazz- IABP 1:2, Vent - pulm following, s/p PEA arrest yesterday- bronch with lots of thick secretions, sedated, CPM  POD 3 - HOTN - epi/jazz- decreased from yesterday, IABP 1:2, Vent - CMV 26, PEEP 10, sedated, diurese well yesterday- start lasix gtt today, CPM  POD 4 NSR, Hotn-epi/jazz, down slightly form yesterday.  IABP 1:2 no change in pressures with pump on standby--change to 1:4.  On lasix gtt, diuresing well.  FiO2 down to 40%, PEEP 10.  Continue lasix gtt.  Will plan on removing IABP today. CPM.   POD 5 HDS, SB- d/c amio, epi/jazz- weaning, Vent- per pulm, sedated when awake follows per RN, lasix gtt- cont, CPM  POD 6 HDS, NSR, epi/jazz--weaning.  On lasix gtt.  WBCs up, bronch yesterday now on ABX.  Mod r effusion.  Will tap.  Keep SERGE drain L thigh one more day.Keep CTs while vented.  CPM.  POD 7 HDS, on low dose epi, off jazz.  NSR. RUE thrombus started on heparin gtt--held this AM for thoracentesis.  Vent 40%/peep 8.  Sedation weaning.  PLAN:  DC temp wires, resume anticoagulation after thoracentesis.  Vent per pulm. Keep CTs today.  POD 8 Remains on epi, no change.  Back in afib yesterday, rebolused amio, now AT vs a flutter?  Cardiology to address. On lovenox.  CXR remains with edema/effusions, unable to perform thoracentesis yesterday. Keep lasix gtt today and k-scale.   Agitated, kept on sedation.  Vent per PMA.  May need to move toward trach next few days.  Plan to hold lovenox in AM to DC CTs and SERGE drain.  DC prevena, DC staples EVH site. CPM.  POD 9 Epi/jazz/lasix, ST, VDRF, sedated.  Lasix gtt.  PLAN: Vent per PMA,  Lovenox held for trach likely today.  DC SERGE drain.  CPM.   POD 10 Epi/jazz/lasix, s/p trach, sedated but more alert/cooperative this AM.  Some serosanguinous oozing SERGE exit site.  Echo today per cardiology. CPM.  POD 11 Still on epi and jazz drips.  Awake  alert.  Follows commands.  Awaiting echo.  Once off drips to LTAC for rehab.  Moderate left pleural effusion.  Will order bedside tap.   POD 12 Paroxysmal afib, anticoagulated on lovenox, cards following.  Off jazz, low dose epi only.  More alert today, followsCRYSTAL. Thoracentesis planned for today.  CPM.  Push PT/OT.  POD 13 Paroxysmal afib, back on epi.  1000ml off L thoracentesis yesterday and s/p bronch by pulmonary.  To cardiac chair today.  Patient anxious at night, on precedex. Add PRN xanax, wean off precedex. Wean off epi as able.  CPM.    POD 14  HDS, afib rate controlled, neuro intact on precedex drip., wounds CDI, abdomin S/NT, fluid balance negative, wt stable.  Off pressors.  Awake alert.  Plan:  Start low dose  BB, no ACE HOTN, Statin, IS/ambulate. CPM.  POD 15 HDS, no pressors, leg ozzing from drain site.  Alert.  Follows commands.  Plan:  Wrap leg with ace.  To LTAC soon.  Wean vent as tolerated.    POD 16 HDS, SR, leg continues to ozz moderate amount.  Alert, follows commands.  Fluid balance negative 10 liters.  Plan:  CPM.  To LTAC once authorized.   POD 17 HDS, SR, trach/vent,  leg incisions c/d/i, alert & follows, medically cleared for discharge today- plan to TPH today    Patient seen, examined and plan reviewed with midlevel provider. I agree with the plan.      Active Hospital Problems    Diagnosis   • CAD (coronary artery disease) [I25.10]   • CHF (congestive heart failure) (CMS-HCC) [I50.9]

## 2018-01-08 NOTE — PROGRESS NOTES
Pulmonary Critical Care Progress Note      DOS:  1/8/18, admit 12/19/2017  Reason for visit:  A&C respiratory failure, CAD s/p CABG, AF/flut, Low K/Mg, S/p trach 12/31  Chief Complaint: CAD, 2V CABG    History of Present Illness: 51 y/o M, h/o DM, COPD, DLD, CAD, SHASHANK, Obesity; underwent 2v CABG 12/22    Interval Events:  24 hour interval history reviewed    - No overnight events   - Following more appropriately   - a-fib/flutter 80-120s   - -120s   - TFs at goal   - UOP 2800cc overnight   - Mobilizing   - day #18 of vent, trach day #9   - SBT for 4 hour this morning   - CXR (reviewed): widened mediastinum, pulm edema    Yesterday's Events:  Less agitated after Seroquel  TF goal  UO adequate  Tm 99.3  EOB [birth year]  Min  AF - flut - 90s  SBp 90s  Trach ok  PEEP 10 - 30%  4 hrs SBT  Full dose lovenox  Anti -10A levels came back a bit high  Ceftriaxone    D/w daughter at bedside again      Review of Systems   Constitutional: Negative.    HENT: Negative.    Eyes: Negative.    Respiratory: Negative.    Cardiovascular: Negative.    Gastrointestinal: Negative.    Genitourinary: Negative.    Musculoskeletal: Negative.    Skin: Negative.    Neurological: Negative.    Endo/Heme/Allergies: Negative.    Psychiatric/Behavioral: Negative.        Physical Exam   Constitutional: He appears well-developed and well-nourished. No distress.   Awake/alert, following all commands   HENT:   Head: Normocephalic and atraumatic.   Mouth/Throat: Oropharynx is clear and moist.   Eyes: Conjunctivae and EOM are normal. Pupils are equal, round, and reactive to light. No scleral icterus.   Neck: Neck supple. No JVD present. No tracheal deviation present.   Cardiovascular: Normal rate, normal heart sounds and intact distal pulses.  Exam reveals no gallop and no friction rub.    No murmur heard.  Pulmonary/Chest: No respiratory distress. He has rales.   Coarse throughout with diminished breath sounds at the bases, no wheezing.  Well healing trach  site   Abdominal: Soft. He exhibits no distension. There is no tenderness. There is no rebound and no guarding.   Musculoskeletal: Normal range of motion. He exhibits no deformity.   Lymphadenopathy:     He has no cervical adenopathy.   Neurological: He is alert. No cranial nerve deficit.   Moving all extremities x 4, following all commands     Skin: Skin is warm and dry. No rash noted.   Psychiatric: He has a normal mood and affect.   Nursing note and vitals reviewed.     No change      PFSH:  No change.    Respiratory:  Leo Vent Mode: APVCMV, Rate (breaths/min): 26, Vt Target (mL): 540, PEEP/CPAP: 8, FiO2: 30, Static Compliance (ml / cm H2O): 39, Control VTE (exp VT): 579  Pulse Oximetry: 100 %  Reviewed vent waveforms   HemoDynamics:  Pulse: 66, Heart Rate (Monitored): 72  NIBP: 100/63    Neuro:  GCS Total [place] Coma Score: 11         Fluids:  Intake/Output       01/06/18 0700 - 01/07/18 0659 01/07/18 0700 - 01/08/18 0659 01/08/18 0700 - 01/09/18 0659      3465-6002 8447-9587 Total 1001-0538 8904-4906 Total 3954-0871 5530-2710 Total       Intake    I.V.  173.3  40 213.3  --  -- --  --  -- --    Precedex Volume 53.3 -- 53.3 -- -- -- -- -- --    NS  40 160 -- -- -- -- -- --    Other  580  160 740  530  210 740  --  -- --    Medications (P.O./ Enteral Liquids) 580 160 740 530 210 740 -- -- --    Enteral  1380  1110 2490  1470  940 2410  --  -- --    Enteral Volume 1020 1020 2040 4026 040 2081 -- -- --    Free Water / Tube Flush 360 90 450 450 90 540 -- -- --    Total Intake 2133.3 1310 3443.3 2000 1150 3150 -- -- --       Output    Urine  2100  1775 3875  1850  2400 4250  --  -- --    Indwelling Cathether 2100 600 2700 -- -- -- -- -- --    Void (ml) -- 1175 1175 1850 2400 4250 -- -- --    Stool  --  -- --  --  -- --  --  -- --    Number of Times Stooled 1 x 1 x 2 x 4 x 0 x 4 x -- -- --    Total Output 2100 9729 4644 1850 2400 4250 -- -- --       Net I/O     33.3 -465 -431.8 150 -1250 -1100 -- -- --         Weight: (!) 155.1 kg (341 lb 14.9 oz)  Recent Labs      18   0610  18   0245  18   0423   SODIUM  149*  147*  146*   POTASSIUM  4.5  4.4  4.3   CHLORIDE  110  109  109   CO2  33  33  28   BUN  49*  54*  48*   CREATININE  1.15  1.21  1.08   CALCIUM  8.1*  8.2*  8.2*       GI/Nutrition:  TF goal  Liver Function  Recent Labs      18   0610  18   0245  18   0423   PREALBUMIN   --    --   10.0*   GLUCOSE  130*  109*  106*       Heme:  Recent Labs      18   0610  18   1130  18   0245  18   1334  18   0423   RBC  3.22*   --   3.30*   --    --    HEMOGLOBIN  7.6*   --   7.6*   --    --    HEMATOCRIT  27.6*   --   28.0*   --    --    PLATELETCT  540*   --   538*   --    --    PROTHROMBTM   --   14.9*   --   15.4*  15.2*   INR   --   1.20*   --   1.25*  1.23*       Infectious Disease:  Temp  Av °C (98.6 °F)  Min: 36.9 °C (98.4 °F)  Max: 37.2 °C (99 °F)  Micro: reviewed  Recent Labs      18   0610  01/07/18   0245   WBC  17.5*  16.0*     Current Facility-Administered Medications   Medication Dose Frequency Provider Last Rate Last Dose   • enoxaparin (LOVENOX) inj 120 mg  120 mg Q12HRS Silvio Eller M.D.   120 mg at 186   • captopril (CAPOTEN) tablet 25 mg  25 mg Q8HRS Jerry Reyes M.D.   25 mg at 18 2329   • spironolactone (ALDACTONE) tablet 50 mg  50 mg Q DAY Jerry Reyes M.D.       • alprazolam (XANAX) tablet 0.5 mg  0.5 mg Q8HRS PRN Kali Evans Jr., D.O.       • MD ALERT... warfarin (COUMADIN) per pharmacy protocol   pharmacy to dose Silvio Eller M.D.       • quetiapine (SEROQUEL) tablet 50 mg  50 mg TID Silvio Eller M.D.   50 mg at 183   • warfarin (COUMADIN) tablet 5 mg  5 mg COUMADIN-DAILY Silvio Eller M.D.   5 mg at 18 1720   • furosemide (LASIX) injection 40 mg  40 mg BID DIURETIC Jerry Reyes M.D.   40 mg at 18 1720   • cefTRIAXone (ROCEPHIN) syringe  2 g  2 g Q24HRS Silvio Eller M.D.   2 g at 01/07/18 0842   • nystatin (MYCOSTATIN) 017813 UNIT/ML suspension 500,000 Units  5 mL 4X/DAY Silvio Eller M.D.   500,000 Units at 01/07/18 2114   • rosuvastatin (CRESTOR) tablet 40 mg  40 mg Q EVENING Jerry Reyes M.D.   40 mg at 01/07/18 2113   • digoxin (LANOXIN) tablet 250 mcg  250 mcg DAILY AT 1800 Jerry Reyes M.D.   250 mcg at 01/07/18 1720   • amiodarone (CORDARONE) tablet 400 mg  400 mg TWICE DAILY Gui Delcid M.D.   400 mg at 01/07/18 2114   • docusate sodium 100mg/10mL (COLACE) solution 100 mg  100 mg DAILY SUSI Mendoza.P.N.   Stopped at 01/05/18 0900    Or   • docusate sodium (COLACE) capsule 100 mg  100 mg DAILY Silvia Prestno A.P.N.   100 mg at 01/02/18 0831   • potassium bicarbonate (KLYTE) 25 MEQ effervescent tablet TBEF 50 mEq  50 mEq Q6HRS Amrik Banegas M.D.   50 mEq at 01/07/18 2319   • aspirin (ASA) chewable tab 81 mg  81 mg DAILY Kiel Ash M.D.   81 mg at 01/07/18 0843   • insulin NPH (HUMULIN,NOVOLIN) injection 5 Units  5 Units Q8HRS SUSI Mendoza.P.N.   5 Units at 01/07/18 2317   • famotidine (PEPCID) tablet 20 mg  20 mg BID Rodolfo Landa M.D.   20 mg at 01/07/18 2115   • chlorhexidine (PERIDEX) 0.12 % solution 15 mL  15 mL BID Rodolfo Landa M.D.   15 mL at 01/07/18 2114   • insulin lispro (HUMALOG) injection 0-15 Units  0-15 Units Q6HRS KAMALA AlfredP.N.   Stopped at 01/07/18 1800   • Respiratory Care per Protocol   Continuous RT SUSI Mendoza.P.N.       • Pharmacy Consult Request ...Pain Management Review 1 Each  1 Each PRN KAMALA MendozaPAlexN.       • senna-docusate (PERICOLACE or SENOKOT S) 8.6-50 MG per tablet 1 Tab  1 Tab Nightly KAMALA MendozaP.N.   Stopped at 01/07/18 2100    And   • senna-docusate (PERICOLACE or SENOKOT S) 8.6-50 MG per tablet 1 Tab  1 Tab Q24HRS PRN KAMALA MendozaP.N.   1 Tab at 12/28/17 0732    And   • lactulose 20 GM/30ML solution 30 mL  30 mL Q24HRS  PRN Silvia Preston, A.P.N.   30 mL at 01/03/18 0912    And   • bisacodyl (DULCOLAX) suppository 10 mg  10 mg Q24HRS PRN Silvia Preston, A.P.N.   10 mg at 01/03/18 0912    And   • fleet enema 133 mL  1 Each Once PRN Silvia Preston, A.P.N.       • oxycodone immediate release (ROXICODONE) tablet 5 mg  5 mg Q3HRS PRN Silvia Preston, A.P.N.   5 mg at 01/05/18 1722   • oxycodone immediate release (ROXICODONE) tablet 10 mg  10 mg Q3HRS PRN Silvia Preston, A.P.N.   10 mg at 01/05/18 1416   • morphine (pf) 4 mg/ml injection 4 mg  4 mg Q3HRS PRN Silvia Preston, A.P.N.   4 mg at 01/06/18 0907   • tramadol (ULTRAM) 50 MG tablet 50 mg  50 mg Q4HRS PRN Silvia Preston, A.P.N.   50 mg at 01/06/18 2049   • morphine (pf) 4 mg/ml injection 4 mg  4 mg Q HOUR PRN Silvia Preston, A.P.N.   4 mg at 01/05/18 2200   • ondansetron (ZOFRAN) syringe/vial injection 4 mg  4 mg Q6HRS PRN Silvia Preston, A.P.N.        Or   • prochlorperazine (COMPAZINE) injection 10 mg  10 mg Q6HRS PRN Silvia Preston, A.P.N.        Or   • promethazine (PHENERGAN) suppository 25 mg  25 mg Q6HRS PRN Silvia Preston, A.P.N.       • acetaminophen (TYLENOL) tablet 650 mg  650 mg Q4HRS PRN Silvia Preston, A.P.N.   650 mg at 01/01/18 1840    Or   • acetaminophen (TYLENOL) suppository 650 mg  650 mg Q4HRS PRN Silvia Preston, A.P.N.       • mag hydrox-al hydrox-simeth (MAALOX PLUS ES or MYLANTA DS) suspension 30 mL  30 mL Q4HRS PRN KAMALA MendozaP.N.   30 mL at 01/01/18 0753   • diphenhydrAMINE (BENADRYL) tablet/capsule 25 mg  25 mg HS PRN - MR X 1 SUSI Mendoza.P.N.   25 mg at 01/07/18 2114   • albuterol inhaler 2 Puff  2 Puff Q4H PRN (RT) Kiel Ash M.D.       • ipratropium-albuterol (DUONEB) nebulizer solution 3 mL  3 mL Q4H PRN (RT) Kiel Ash M.D.   3 mL at 12/23/17 1154     Last reviewed on 12/22/2017  7:07 AM by Katie Aly R.N.    Quality  Measures:  Labs reviewed, Medications reviewed and Radiology images reviewed  Ceballos catheter: No  Ceballos  Central line in place: Need for access    DVT Prophylaxis: Enoxaparin (Lovenox) and Warfarin (Coumadin)  DVT prophylaxis - mechanical: SCDs  Ulcer prophylaxis: Yes  Antibiotics: Treating active infection/contamination beyond 24 hours perioperative coverage        Assessment/Plan:  Status post 2-vessel coronary artery bypass grafting on 12/22/2017.   - s/p IABP    - vasopressors off 1/4   - chest tubes out   - cont IV lasix   - no further ectopy  Acute hypoxic respiratory failure secondary to above.   - intubated 12/22   - cont full vent support at night and cont SBT trials during the day   - s/p trach 12/31   - serial ABG/CXR and vent mechanics review   - LTACH eval   - sedation vacations - try to swap Prop to DEX - lighten sedation   - Mobilize when safe as tolerated  ABN CXR - bilateral effusion/atx    - L effusion worse - L thoracentesis -1/3   - BAL 1/3 fluid growing Klebsiella oxytoca, 7 days ceftriaxone treatment: d/c on 1/12   - Serial chest x-ray to eval for the need for repeat bronchoscopy or thoracentesis   - Aggressive pulmonary toilet and mobilize as tolerated to help prevent above  AF/RVR   - Amiodarone--> wean to 200mg enterally in 7 days per cardiology   - Digoxin   - Anti-coag   - ERP keep K > 4 and Mg > 2  Coronary artery disease with multivessel disease - ASA/full antiocoag  Pneumonia   - MSSA and Klebsiella identified   - Antibiotics de-escalated to Ancef, continue ×10 days - stop 1/5  Cardiomyopathy EF 35% and global systolic dysfunction   Severely dilated right ventricle - caution with volume excess  Chronic obstructive pulmonary disease - RT protocosl  Reported diabetes.   - ssi/NPH and q6 FS - adjust dosing daily at Team rounds  Dyslipidemia - statin  Clinically with obstructive sleep apnea - slow removal of trach - PSG later?  Agitation secondary to above - Seroquel trial/titrate dose over next several days   Prophylaxis - ABCDEF bundle    LTAC transfer today    Discussed patient  condition and risk of morbidity and/or mortality with Family, RN, RT, Pharmacy, Charge nurse / hot rounds and [facility].    [id number]

## 2018-01-08 NOTE — PROGRESS NOTES
Inpatient Anticoagulation Service Note    Date: 1/8/2018  Reason for Anticoagulation: Atrial Fibrillation, Deep Vein Thrombosis   Hemoglobin Value: (!) 7.7  Hematocrit Value: (!) 28.3  Lab Platelet Value: (!) 558  Target INR: 2.0 to 3.0    INR from last 7 days     Date/Time INR Value    01/08/18 0423 (!)  1.23    01/07/18 1334 (!)  1.25    01/06/18 1130 (!)  1.2        Dose from last 7 days     Date/Time Dose (mg)    01/08/18 1500  7.5 mg today, then 5 mg PO daily    01/07/18 1400  5    01/06/18 1600  5        Average Dose (mg):  (new start)  Significant Interactions: Aspirin, Amiodarone  Bridge Therapy: Yes (Lovenox 120 mg SC BID)  Date of Last VTE Event: 12/28/18  Bridge Therapy Start Date: 01/06/18  Days of Overlap Therapy: 2     Comments: INR unchanged after two doses. Both charted as being given. Will give bum pdose today and then continue 5 mg PO daily. If INR not up more tomorrow may need to increase daily dose as well. INR in AM. No bleeding noted. Plan to go to [facility] once bed available.    Education Material Provided?: No  Pharmacist suggested discharge dosing: would try Warfarin 5 mg PO daily at this point and follow up INR within 72 hours of discharge     Jason Aceves, PharmD, BCPS

## 2018-01-08 NOTE — DISCHARGE PLANNING
Medical SW    Referral: Sw spoke to [facility] w/ LTAC/[facility]. She will contact this Sw once she has approval for pt to transfer via [facility].       Intervention: Sw spoke to surgery KAYLIE Garcia. She will complete d.c summary.    Rounds: larry updated medical team, sw updated pt's wife regarding d/c plan.    Last BM this AM, wife at bedside, day 18 on vent, day 9 trach.    -Sw spoke to [facility] and she will set up transport between 2-4 PM today. Larry has started transport documents for [facility] ([facility]/ambulance) and internal transport form. Once Larry aware of actual requested d/c time per Ioana and the accepting MD at [facility] Dr Malin, Larry will fax these documents to Dinorah GONZALEZ. Larry left  ja/ Dinorah up dating and notifying [facility] transport for this afternoon.    - Larry advised by Ioana w/ [facility]/AC pt can transfer at 4PM. Larry faxed completed internal transport and PCS docs to [facility]. Larry left  w/ Dinorah GONZALEZ.    -Ioana indicates Dr Small should be providing an hand written order for Vent to Larry. Larry should place original copy in transfer packet and fax to [facility] at fax number [phone].    Plan: Larry to assist w/ d/c planning as needed.

## 2018-01-08 NOTE — DISCHARGE PLANNING
Medical Social Work    SW received call from [facility] indicating that PCS form is not detailed enough for [facility] and an updated form is requested. SW completed new PCS form and faxed directly to [facility] for review. IFRAH spoke with Bradly at [facility] who confirmed form includes necessary information and transport for 1600 is arranged.

## 2018-01-08 NOTE — DISCHARGE PLANNING
Medical Social Work    IFRAH received call from oIana with [facility] who indicated that pt will not be able to transfer today as the hospital is unexpectedly short staffed for RN's. Ioana notified RN directly, IFRAH contacted [facility] and advised of above. Unit IFRAH Warren to f/u in morning with transport.

## 2018-01-09 ENCOUNTER — APPOINTMENT (OUTPATIENT)
Dept: RADIOLOGY | Facility: MEDICAL CENTER | Age: 53
DRG: 003 | End: 2018-01-09
Attending: INTERNAL MEDICINE
Payer: MEDICARE

## 2018-01-09 LAB
ANION GAP SERPL CALC-SCNC: 9 MMOL/L (ref 0–11.9)
BUN SERPL-MCNC: 48 MG/DL (ref 8–22)
CALCIUM SERPL-MCNC: 8.8 MG/DL (ref 8.5–10.5)
CHLORIDE SERPL-SCNC: 110 MMOL/L (ref 96–112)
CO2 SERPL-SCNC: 26 MMOL/L (ref 20–33)
CREAT SERPL-MCNC: 1.23 MG/DL (ref 0.5–1.4)
ERYTHROCYTE [DISTWIDTH] IN BLOOD BY AUTOMATED COUNT: 69.9 FL (ref 35.9–50)
GLUCOSE BLD-MCNC: 121 MG/DL (ref 65–99)
GLUCOSE BLD-MCNC: 125 MG/DL (ref 65–99)
GLUCOSE BLD-MCNC: 138 MG/DL (ref 65–99)
GLUCOSE BLD-MCNC: 143 MG/DL (ref 65–99)
GLUCOSE BLD-MCNC: 151 MG/DL (ref 65–99)
GLUCOSE SERPL-MCNC: 132 MG/DL (ref 65–99)
HCT VFR BLD AUTO: 31.2 % (ref 42–52)
HGB BLD-MCNC: 8.5 G/DL (ref 14–18)
INR PPP: 1.11 (ref 0.87–1.13)
MCH RBC QN AUTO: 23.7 PG (ref 27–33)
MCHC RBC AUTO-ENTMCNC: 27.2 G/DL (ref 33.7–35.3)
MCV RBC AUTO: 86.9 FL (ref 81.4–97.8)
PLATELET # BLD AUTO: 615 K/UL (ref 164–446)
PMV BLD AUTO: 10.7 FL (ref 9–12.9)
POTASSIUM SERPL-SCNC: 5.1 MMOL/L (ref 3.6–5.5)
PROTHROMBIN TIME: 14 SEC (ref 12–14.6)
RBC # BLD AUTO: 3.59 M/UL (ref 4.7–6.1)
SODIUM SERPL-SCNC: 145 MMOL/L (ref 135–145)
WBC # BLD AUTO: 18.9 K/UL (ref 4.8–10.8)

## 2018-01-09 PROCEDURE — 94640 AIRWAY INHALATION TREATMENT: CPT

## 2018-01-09 PROCEDURE — 80048 BASIC METABOLIC PNL TOTAL CA: CPT

## 2018-01-09 PROCEDURE — 700102 HCHG RX REV CODE 250 W/ 637 OVERRIDE(OP): Performed by: INTERNAL MEDICINE

## 2018-01-09 PROCEDURE — 770022 HCHG ROOM/CARE - ICU (200)

## 2018-01-09 PROCEDURE — 94003 VENT MGMT INPAT SUBQ DAY: CPT

## 2018-01-09 PROCEDURE — A9270 NON-COVERED ITEM OR SERVICE: HCPCS | Performed by: INTERNAL MEDICINE

## 2018-01-09 PROCEDURE — 700111 HCHG RX REV CODE 636 W/ 250 OVERRIDE (IP): Performed by: INTERNAL MEDICINE

## 2018-01-09 PROCEDURE — A9270 NON-COVERED ITEM OR SERVICE: HCPCS | Performed by: THORACIC SURGERY (CARDIOTHORACIC VASCULAR SURGERY)

## 2018-01-09 PROCEDURE — 85027 COMPLETE CBC AUTOMATED: CPT

## 2018-01-09 PROCEDURE — 700102 HCHG RX REV CODE 250 W/ 637 OVERRIDE(OP): Performed by: CLINICAL NURSE SPECIALIST

## 2018-01-09 PROCEDURE — 71045 X-RAY EXAM CHEST 1 VIEW: CPT

## 2018-01-09 PROCEDURE — 85610 PROTHROMBIN TIME: CPT

## 2018-01-09 PROCEDURE — A9270 NON-COVERED ITEM OR SERVICE: HCPCS | Performed by: CLINICAL NURSE SPECIALIST

## 2018-01-09 PROCEDURE — 82962 GLUCOSE BLOOD TEST: CPT

## 2018-01-09 PROCEDURE — 700102 HCHG RX REV CODE 250 W/ 637 OVERRIDE(OP): Performed by: THORACIC SURGERY (CARDIOTHORACIC VASCULAR SURGERY)

## 2018-01-09 RX ORDER — FUROSEMIDE 20 MG/1
40 TABLET ORAL
Status: DISCONTINUED | OUTPATIENT
Start: 2018-01-09 | End: 2018-01-10 | Stop reason: HOSPADM

## 2018-01-09 RX ORDER — WARFARIN SODIUM 7.5 MG/1
7.5 TABLET ORAL
Status: DISCONTINUED | OUTPATIENT
Start: 2018-01-09 | End: 2018-01-10 | Stop reason: HOSPADM

## 2018-01-09 RX ORDER — CEFDINIR 300 MG/1
300 CAPSULE ORAL EVERY 12 HOURS
Status: DISCONTINUED | OUTPATIENT
Start: 2018-01-10 | End: 2018-01-10 | Stop reason: HOSPADM

## 2018-01-09 RX ADMIN — ALPRAZOLAM 0.5 MG: 0.5 TABLET ORAL at 11:41

## 2018-01-09 RX ADMIN — INSULIN HUMAN 5 UNITS: 100 INJECTION, SUSPENSION SUBCUTANEOUS at 21:49

## 2018-01-09 RX ADMIN — CHLORHEXIDINE GLUCONATE 15 ML: 1.2 RINSE ORAL at 08:46

## 2018-01-09 RX ADMIN — OXYCODONE HYDROCHLORIDE 10 MG: 10 TABLET ORAL at 21:08

## 2018-01-09 RX ADMIN — QUETIAPINE FUMARATE 50 MG: 25 TABLET ORAL at 08:46

## 2018-01-09 RX ADMIN — ROSUVASTATIN CALCIUM 40 MG: 10 TABLET, FILM COATED ORAL at 20:50

## 2018-01-09 RX ADMIN — INSULIN HUMAN 5 UNITS: 100 INJECTION, SUSPENSION SUBCUTANEOUS at 12:32

## 2018-01-09 RX ADMIN — AMIODARONE HYDROCHLORIDE 400 MG: 200 TABLET ORAL at 20:51

## 2018-01-09 RX ADMIN — NYSTATIN 500000 UNITS: 100000 SUSPENSION ORAL at 12:34

## 2018-01-09 RX ADMIN — FUROSEMIDE 40 MG: 10 INJECTION, SOLUTION INTRAMUSCULAR; INTRAVENOUS at 05:05

## 2018-01-09 RX ADMIN — FAMOTIDINE 20 MG: 20 TABLET, FILM COATED ORAL at 20:49

## 2018-01-09 RX ADMIN — NYSTATIN 500000 UNITS: 100000 SUSPENSION ORAL at 08:46

## 2018-01-09 RX ADMIN — AMIODARONE HYDROCHLORIDE 400 MG: 200 TABLET ORAL at 08:45

## 2018-01-09 RX ADMIN — POTASSIUM BICARBONATE 50 MEQ: 25 TABLET, EFFERVESCENT ORAL at 20:50

## 2018-01-09 RX ADMIN — WARFARIN SODIUM 7.5 MG: 7.5 TABLET ORAL at 17:46

## 2018-01-09 RX ADMIN — FAMOTIDINE 20 MG: 20 TABLET, FILM COATED ORAL at 08:46

## 2018-01-09 RX ADMIN — NYSTATIN 500000 UNITS: 100000 SUSPENSION ORAL at 16:56

## 2018-01-09 RX ADMIN — LISINOPRIL 10 MG: 10 TABLET ORAL at 08:46

## 2018-01-09 RX ADMIN — ALPRAZOLAM 0.5 MG: 0.5 TABLET ORAL at 21:16

## 2018-01-09 RX ADMIN — ENOXAPARIN SODIUM 120 MG: 150 INJECTION SUBCUTANEOUS at 20:50

## 2018-01-09 RX ADMIN — CHLORHEXIDINE GLUCONATE 15 ML: 1.2 RINSE ORAL at 20:50

## 2018-01-09 RX ADMIN — NYSTATIN 500000 UNITS: 100000 SUSPENSION ORAL at 20:50

## 2018-01-09 RX ADMIN — INSULIN HUMAN 5 UNITS: 100 INJECTION, SUSPENSION SUBCUTANEOUS at 05:06

## 2018-01-09 RX ADMIN — QUETIAPINE FUMARATE 50 MG: 25 TABLET ORAL at 16:57

## 2018-01-09 RX ADMIN — ASPIRIN 81 MG: 81 TABLET, CHEWABLE ORAL at 08:45

## 2018-01-09 RX ADMIN — ENOXAPARIN SODIUM 120 MG: 150 INJECTION SUBCUTANEOUS at 08:46

## 2018-01-09 RX ADMIN — QUETIAPINE FUMARATE 50 MG: 25 TABLET ORAL at 20:50

## 2018-01-09 RX ADMIN — DIGOXIN 250 MCG: 125 TABLET ORAL at 16:56

## 2018-01-09 RX ADMIN — FUROSEMIDE 40 MG: 20 TABLET ORAL at 16:56

## 2018-01-09 RX ADMIN — POTASSIUM BICARBONATE 50 MEQ: 25 TABLET, EFFERVESCENT ORAL at 05:06

## 2018-01-09 RX ADMIN — CEFTRIAXONE 2 G: 2 INJECTION, POWDER, FOR SOLUTION INTRAMUSCULAR; INTRAVENOUS at 08:46

## 2018-01-09 RX ADMIN — SPIRONOLACTONE 50 MG: 25 TABLET, FILM COATED ORAL at 08:46

## 2018-01-09 ASSESSMENT — ENCOUNTER SYMPTOMS
EYES NEGATIVE: 1
CARDIOVASCULAR NEGATIVE: 1
RESPIRATORY NEGATIVE: 1
NEUROLOGICAL NEGATIVE: 1
MUSCULOSKELETAL NEGATIVE: 1
GASTROINTESTINAL NEGATIVE: 1
CONSTITUTIONAL NEGATIVE: 1
PSYCHIATRIC NEGATIVE: 1

## 2018-01-09 ASSESSMENT — PAIN SCALES - GENERAL: PAINLEVEL_OUTOF10: 0

## 2018-01-09 ASSESSMENT — LIFESTYLE VARIABLES: REASON UNABLE TO ASSESS: INTUBATED/TRACHED

## 2018-01-09 NOTE — DISCHARGE PLANNING
Medical SW    Referral: Sw attended AM IDT Rounds.    Intervention: Obey updated medical team, felicitas Triplett reporting this AM they are short staffed and may not be able to transfer today to [facility]/LTAC, daughter at rounds discussion,     Ioana is not able to transport pt today as they still have reduced RN staff. They will have full RN staff tomorrow. Obey updated medical team during rounds and Dinorah GONZALEZ was asking when to transport pt today.     Plan: Sw to assist w/ d/c planning as needed.

## 2018-01-09 NOTE — DISCHARGE PLANNING
Call from Bradly with [facility] on the status with patient transfer to LTAC. Per conversation with IFRAH Warren, patient will not be able to transfer today due to receiving facility ([facility]) being short staffed.   Bradly ([facility]) will cancel transport and we will need to call JACEY for a new schedule transport when patient is ready.
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
## 2018-01-09 NOTE — CARE PLAN
Problem: Ventilation Defect:  Goal: Ability to achieve and maintain unassisted ventilation or tolerate decreased levels of ventilator support  Outcome: PROGRESSING AS EXPECTED  Adult Ventilation Update    Total Vent Days: 18  Trach day: 9    Patient Lines/Drains/Airways Status    Active Airway     Name: Placement date: Placement time: Site: Days:    Airway Group Standard Cuffed Trach Tracheostomy 8.0 12/31/17   1106   Tracheostomy   8              In the last 24 hours, the patient tolerated SBT for 4 hours on settings of 5/8. Attempted T-piece 10L/40% for 30 minutes. Had an increased wob and HR    #FVC / Vital Capacity (liters) : 1.1 (01/02/18 0802)  NIF (cm H2O) : -23 (01/02/18 0802)  Rapid Shallow Breathing Index (RR/VT): 57 (01/08/18 0634)  Plateau Pressure (Q Shift):  (pt overbreathing vent) (01/08/18 0634)  Static Compliance (ml / cm H2O): 77 (01/08/18 1611)    Patient failed trials because of Barriers to Wean: Other (Comments) (hemodynamically unstable) (01/01/18 1257)  Barriers to SBT Weaning Trial Stopped due to:: Abnormal breathing pattern (paradoxical respiratory pattern, use of accessory muscles, etc) (01/07/18 1420)  Length of Weaning Trial Length of Weaning Trial (Hours): 4 hours (01/08/18 0634)    Cough: Productive (01/08/18 1200)  Sputum Amount: Moderate (01/08/18 1200)  Sputum Color: White;Clear (01/08/18 1200)  Sputum Consistency: Thick;Thin (01/08/18 1200)    Mobility Group  Activity Performed: Edge of bed (01/08/18 1200)  Time Activity Tolerated: 20 min (01/08/18 1200)  Distance Per Occurrence (ft.): 0 feet (01/08/18 1200)  # of Times Distance was Traveled: 0 (01/08/18 1200)  Assistance / Tolerance: Assistance of Two or More;Tolerates Well (01/08/18 1200)  Pt Calls for Assistance: No (01/08/18 1200)  Staff Present for Mobilization: RN (01/08/18 1200)  Gait: Steady (12/21/17 2030)  Assistive Devices: Hand held assist (01/08/18 1200)  Reason Not Mobilized: Other (comment) (will reassess with day RN)  (01/06/18 0630)  Mobilization Comments:  (RASS +3 off sedation, pt. started swinging at me) (01/05/18 2000)    Events/Summary/Plan: placed pt back on ventilator due to increased HR and increased WOB (01/08/18 1611)

## 2018-01-09 NOTE — CARE PLAN
Problem: Psychosocial Needs:  Goal: Level of anxiety will decrease  Patient becoming physically aggressive and tearful.. RN and RT transferred patient into cardiac chair with transport monitor and on Tpiece to move around hospital. RN sat next to patient in cardiac chair to decrease risk of fall.    Problem: Mobility  Goal: Risk for activity intolerance will decrease  Patient up to EOB at change of shift for 15 minutes. Mid shift transferred to cardiac chair, tolerating well

## 2018-01-09 NOTE — PROGRESS NOTES
Pulmonary Critical Care Progress Note      DOS:  1/9/18, admit 12/19/2017  Reason for visit:  A&C respiratory failure, CAD s/p CABG, AF/flut, Low K/Mg, S/p trach 12/31  Chief Complaint: CAD, 2V CABG    History of Present Illness: 51 y/o M, h/o DM, COPD, DLD, CAD, SHASHANK, Obesity; underwent 2v CABG 12/22    Interval Events:  24 hour interval history reviewed    - no overnight events   - Last BM today   - SR 90s   - SBPs 100-130s   - TFs at goal   - SBTs for 12 hours and will trial T-piece today   - No CXR today    Yesterday's Events:   - No overnight events   - Following more appropriately   - a-fib/flutter 80-120s   - -120s   - TFs at goal   - UOP 2800cc overnight   - Mobilizing   - day #18 of vent, trach day #9   - SBT for 4 hour this morning   - CXR (reviewed): widened mediastinum, pulm edema    Review of Systems   Constitutional: Negative.    HENT: Negative.    Eyes: Negative.    Respiratory: Negative.    Cardiovascular: Negative.    Gastrointestinal: Negative.    Genitourinary: Negative.    Musculoskeletal: Negative.    Skin: Negative.    Neurological: Negative.    Endo/Heme/Allergies: Negative.    Psychiatric/Behavioral: Negative.    likes to pull at IVs    Physical Exam   Constitutional: He appears well-developed and well-nourished. No distress.   Awake/alert, following all commands   HENT:   Head: Normocephalic and atraumatic.   Mouth/Throat: Oropharynx is clear and moist.   Eyes: Conjunctivae and EOM are normal. Pupils are equal, round, and reactive to light. No scleral icterus.   Neck: Neck supple. No JVD present. No tracheal deviation present.   Cardiovascular: Normal rate, normal heart sounds and intact distal pulses.  Exam reveals no gallop and no friction rub.    No murmur heard.  Pulmonary/Chest: No respiratory distress. He has rales.   Coarse throughout with diminished breath sounds at the bases, no wheezing.  Well healing trach site   Abdominal: Soft. He exhibits no distension. There is no  tenderness. There is no rebound and no guarding.   Musculoskeletal: Normal range of motion. He exhibits no deformity.   Lymphadenopathy:     He has no cervical adenopathy.   Neurological: He is alert. No cranial nerve deficit.   Moving all extremities x 4, following all commands     Skin: Skin is warm and dry. No rash noted.   Psychiatric: He has a normal mood and affect.   Nursing note and vitals reviewed.   Exam is grossly unchanged      PFSH:  No change.    Respiratory:  Leo Vent Mode: APVCMV, Rate (breaths/min): 26, Vt Target (mL): 540, PEEP/CPAP: 8, FiO2: 30, Static Compliance (ml / cm H2O): 55, Control VTE (exp VT): 551  Pulse Oximetry: 97 %     HemoDynamics:  Pulse: 75, Heart Rate (Monitored): 92  Blood Pressure: 102/60, NIBP: 103/47    Neuro:  GCS         Fluids:  Intake/Output       01/07/18 0700 - 01/08/18 0659 01/08/18 0700 - 01/09/18 0659 01/09/18 0700 - 01/10/18 0659      2009-1642 9550-1687 Total 0141-5776 7873-5319 Total 1495-4357 6688-3666 Total       Intake    Other  530  210 740  120  240 360  --  -- --    Medications (P.O./ Enteral Liquids) 530 210 740 120 240 360 -- -- --    Enteral  1470  1110 2580  1200  940 2140  --  -- --    Enteral Volume 1020 1020 2040 1122 556 3038 -- -- --    Free Water / Tube Flush 450 90 540 180 90 270 -- -- --    Total Intake 2000 1320 3320 1320 1180 2500 -- -- --       Output    Urine  1850  2850 4700  2300  450 2750  --  -- --    Number of Times Incontinent of Urine -- -- -- -- 1 x 1 x -- -- --    Void (ml) 1850 2850 4700 2300 450 2750 -- -- --    Stool  --  -- --  --  -- --  --  -- --    Number of Times Stooled 4 x 0 x 4 x 1 x 1 x 2 x -- -- --    Total Output 1850 2850 4700 2300 450 2750 -- -- --       Net I/O     150 -1530 -1380 -980 730 -381 -- -- --          Recent Labs      01/07/18   0245  01/08/18   0423  01/09/18   0330   SODIUM  147*  146*  145   POTASSIUM  4.4  4.3  5.1   CHLORIDE  109  109  110   CO2  33  28  26   BUN  54*  48*  48*   CREATININE  1.21   1.08  1.23   CALCIUM  8.2*  8.2*  8.8       GI/Nutrition:  TF goal  Liver Function  Recent Labs      18   0245  18   0423  18   0330   PREALBUMIN   --   10.0*   --    GLUCOSE  109*  106*  132*       Heme:  Recent Labs      18   0245  18   1334  18   0423  18   0330   RBC  3.30*   --   3.28*  3.59*   HEMOGLOBIN  7.6*   --   7.7*  8.5*   HEMATOCRIT  28.0*   --   28.3*  31.2*   PLATELETCT  538*   --   558*  615*   PROTHROMBTM   --   15.4*  15.2*  14.0   INR   --   1.25*  1.23*  1.11       Infectious Disease:  Temp  Av.9 °C (98.5 °F)  Min: 36.7 °C (98 °F)  Max: 37.1 °C (98.8 °F)  Micro: reviewed  Recent Labs      18   02418   0423  18   0330   WBC  16.0*  16.0*  18.9*     Current Facility-Administered Medications   Medication Dose Frequency Provider Last Rate Last Dose   • lisinopril (PRINIVIL) 10 MG tablet 10 mg  10 mg Q DAY Jodi Small M.D.   10 mg at 18 1516   • warfarin (COUMADIN) tablet 5 mg  5 mg COUMADIN-DAILY Kiel Ash M.D.       • enoxaparin (LOVENOX) inj 120 mg  120 mg Q12HRS Silvio Eller M.D.   120 mg at 18 0900   • spironolactone (ALDACTONE) tablet 50 mg  50 mg Q DAY Jerry Reyes M.D.   50 mg at 18 0749   • alprazolam (XANAX) tablet 0.5 mg  0.5 mg Q8HRS PRN Kali Evans Jr., D.O.   0.5 mg at 18 1819   • MD ALERT... warfarin (COUMADIN) per pharmacy protocol   pharmacy to dose Silvio Eller M.D.       • quetiapine (SEROQUEL) tablet 50 mg  50 mg TID Silvio Eller M.D.   50 mg at 18   • furosemide (LASIX) injection 40 mg  40 mg BID DIURETIC Jerry Reyes M.D.   40 mg at 18 0505   • cefTRIAXone (ROCEPHIN) syringe 2 g  2 g Q24HRS Silvio Eller M.D.   2 g at 18 0818   • nystatin (MYCOSTATIN) 454697 UNIT/ML suspension 500,000 Units  5 mL 4X/DAY Silvio Eller M.D.   500,000 Units at 18   • rosuvastatin (CRESTOR) tablet 40 mg  40 mg Q  EVENING Jerry Reyes M.D.   40 mg at 01/08/18 2013   • digoxin (LANOXIN) tablet 250 mcg  250 mcg DAILY AT 1800 Jerry Reyes M.D.   250 mcg at 01/08/18 1719   • amiodarone (CORDARONE) tablet 400 mg  400 mg TWICE DAILY Gui Delcid M.D.   400 mg at 01/08/18 2013   • docusate sodium 100mg/10mL (COLACE) solution 100 mg  100 mg DAILY Silvia Preston A.P.N.   100 mg at 01/08/18 0818    Or   • docusate sodium (COLACE) capsule 100 mg  100 mg DAILY Silvia Preston A.P.N.   100 mg at 01/02/18 0831   • potassium bicarbonate (KLYTE) 25 MEQ effervescent tablet TBEF 50 mEq  50 mEq Q6HRS Amrik Banegas M.D.   50 mEq at 01/09/18 0506   • aspirin (ASA) chewable tab 81 mg  81 mg DAILY Kiel Ash M.D.   81 mg at 01/08/18 0753   • insulin NPH (HUMULIN,NOVOLIN) injection 5 Units  5 Units Q8HRS SUSI Mendoza.P.NlAex   5 Units at 01/09/18 0506   • famotidine (PEPCID) tablet 20 mg  20 mg BID Rodolfo Landa M.D.   20 mg at 01/08/18 2014   • chlorhexidine (PERIDEX) 0.12 % solution 15 mL  15 mL BID Rodolfo Landa M.D.   15 mL at 01/08/18 2013   • insulin lispro (HUMALOG) injection 0-15 Units  0-15 Units Q6HRS KAMALA AlfredPMAMADOU   3 Units at 01/09/18 0506   • Respiratory Care per Protocol   Continuous RT SUSI Mendoza.P.N.       • Pharmacy Consult Request ...Pain Management Review 1 Each  1 Each PRN SUSI Mendoza.P.N.       • senna-docusate (PERICOLACE or SENOKOT S) 8.6-50 MG per tablet 1 Tab  1 Tab Nightly SUSI Mendoza.P.N.   Stopped at 01/07/18 2100    And   • senna-docusate (PERICOLACE or SENOKOT S) 8.6-50 MG per tablet 1 Tab  1 Tab Q24HRS PRN Silvia Preston, A.P.N.   1 Tab at 12/28/17 0732    And   • lactulose 20 GM/30ML solution 30 mL  30 mL Q24HRS PRN Silvia Preston A.P.N.   30 mL at 01/03/18 0912    And   • bisacodyl (DULCOLAX) suppository 10 mg  10 mg Q24HRS PRN Silvia Preston, A.P.N.   10 mg at 01/03/18 0912    And   • fleet enema 133 mL  1 Each Once PRN Silvia Preston, A.P.N.       •  oxycodone immediate release (ROXICODONE) tablet 5 mg  5 mg Q3HRS PRN Silvia Preston, A.P.N.   5 mg at 01/05/18 1722   • oxycodone immediate release (ROXICODONE) tablet 10 mg  10 mg Q3HRS PRN Silvia Preston, A.P.N.   10 mg at 01/05/18 1416   • morphine (pf) 4 mg/ml injection 4 mg  4 mg Q3HRS PRN Silvia Preston, A.P.N.   4 mg at 01/06/18 0907   • tramadol (ULTRAM) 50 MG tablet 50 mg  50 mg Q4HRS PRN Silvia Preston, A.P.N.   50 mg at 01/06/18 2049   • morphine (pf) 4 mg/ml injection 4 mg  4 mg Q HOUR PRN Silvia Preston, A.P.N.   4 mg at 01/05/18 2200   • ondansetron (ZOFRAN) syringe/vial injection 4 mg  4 mg Q6HRS PRN Silvia Preston, A.P.N.        Or   • prochlorperazine (COMPAZINE) injection 10 mg  10 mg Q6HRS PRN Silvia Preston, A.P.N.        Or   • promethazine (PHENERGAN) suppository 25 mg  25 mg Q6HRS PRN Silvia Preston, A.P.N.       • acetaminophen (TYLENOL) tablet 650 mg  650 mg Q4HRS PRN Silvia Preston, A.P.N.   650 mg at 01/01/18 1840    Or   • acetaminophen (TYLENOL) suppository 650 mg  650 mg Q4HRS PRN Silvia Preston, A.P.N.       • mag hydrox-al hydrox-simeth (MAALOX PLUS ES or MYLANTA DS) suspension 30 mL  30 mL Q4HRS PRN Silvia Preston, A.P.N.   30 mL at 01/01/18 0753   • diphenhydrAMINE (BENADRYL) tablet/capsule 25 mg  25 mg HS PRN - MR X 1 Silvia Preston A.P.N.   25 mg at 01/08/18 1957   • albuterol inhaler 2 Puff  2 Puff Q4H PRN (RT) Kiel Ash M.D.       • ipratropium-albuterol (DUONEB) nebulizer solution 3 mL  3 mL Q4H PRN (RT) Kiel Ash M.D.   Stopped at 01/08/18 1812     Last reviewed on 12/22/2017  7:07 AM by Katie Aly R.N.    Quality  Measures:  Labs reviewed, Medications reviewed and Radiology images reviewed  Ceballos catheter: No Ceballos  Central line in place: Need for access    DVT Prophylaxis: Enoxaparin (Lovenox) and Warfarin (Coumadin)  DVT prophylaxis - mechanical: SCDs  Ulcer prophylaxis: Yes  Antibiotics: Treating active infection/contamination beyond 24 hours  perioperative coverage        Assessment/Plan:  Status post 2-vessel coronary artery bypass grafting on 12/22/2017.   - s/p IABP    - vasopressors off 1/4   - chest tubes out   - cont lasix PO   - no further ectopy  Acute hypoxic respiratory failure secondary to above.   - intubated 12/22   - cont T-piece/CPAP trials   - s/p trach 12/31   - LTACH pending   - cont mobilization  ABN CXR - bilateral effusion/atx    - L effusion worse - L thoracentesis -1/3   - BAL 1/3 fluid growing Klebsiella oxytoca, 7 days ceftriaxone treatment: d/c on 1/12   - Serial chest x-ray to eval for the need for repeat bronchoscopy or thoracentesis   - Aggressive pulmonary toilet and mobilize as tolerated to help prevent above  AF/RVR   - Amiodarone--> wean to 200mg enterally in 7 days per cardiology   - Digoxin   - Anti-coag   - ERP keep K > 4 and Mg > 2  Coronary artery disease with multivessel disease - ASA/full antiocoag  Pneumonia   - MSSA and Klebsiella identified   - Antibiotics de-escalated to Ancef and changed to Omnicef today to complete 10 day course  Cardiomyopathy EF 35% and global systolic dysfunction   Severely dilated right ventricle - caution with volume excess  Chronic obstructive pulmonary disease - RT protocosl  Reported diabetes.   - ssi/NPH and q6 FS - adjust dosing daily at Team rounds  Dyslipidemia - statin  Clinically with obstructive sleep apnea - slow removal of trach - PSG later?  Agitation secondary to above - Seroquel trial/titrate dose over next several days   Prophylaxis - ABCDEF bundle    Transfer to LTAC on hold due to staffing issues and hopeful to transfer soon.  Pt's exam, assessment and plan remains unchanged.    Discussed patient condition and risk of morbidity and/or mortality with Family, RN, RT, Pharmacy, Charge nurse / hot rounds and [facility].    [id number]

## 2018-01-09 NOTE — DISCHARGE PLANNING
Medical SW    Referral: Sw spoke to bedside RN. Sw left message w/ liaison at [facility], Ioana.    Intervention: Sw waiting for reply from LTAC to see if pt will go today. RN request pt d/c by noon.      Plan: Sw to assist w/ d/c planning as needed.

## 2018-01-09 NOTE — CARE PLAN
Problem: Safety  Goal: Will remain free from falls  Outcome: PROGRESSING AS EXPECTED  Patient educated on unit policies and procedures to prevent fall risk, including: hourly rounding, call light usage, bed alarms, treaded socks and calling for assistance. Pt continues to pull at lines - re-educated on importance. Sitter at bedside. Family at bedside verbalized understanding. Fall prevention measures assessed and in place.     Problem: Infection  Goal: Will remain free from infection  Outcome: PROGRESSING AS EXPECTED  Patients individual infection risk assessed. Monitored for signs and symptoms of infection throughout shift. Washed hands or foamed in and out every encounter. Utilized universal precautions. Scrubbed hub before access to IV lines per protocol.

## 2018-01-09 NOTE — THERAPY
Spoke with RN regarding orders for clinical swallow evaluation. Pt is trached and continues to require vent support. RN reported that the pt is to be discharged to [facility] today. Pt with Cortrak for nutrition, hydration, and medication administration. Will hold CSE and speaking valve orders until pt has been weaned off of vent.

## 2018-01-09 NOTE — CARE PLAN
Problem: Ventilation Defect:  Goal: Ability to achieve and maintain unassisted ventilation or tolerate decreased levels of ventilator support    Intervention: Support and monitor invasive and noninvasive mechanical ventilation  Adult Ventilation Update    Total Vent Days: 19    Patient Lines/Drains/Airways Status    Active Airway     Name: Placement date: Placement time: Site: Days:    Airway Group Standard Cuffed Trach Tracheostomy 8.0 12/31/17   1106   Tracheostomy   10              In the last 24 hours, the patient tolerated SBT for 4 hours & T-piece for a total of 1 hour 40 min.    Cough: Productive (01/09/18 0400)  Sputum Amount: Large (01/09/18 0400)  Sputum Color: White;Clear (01/09/18 0400)  Sputum Consistency: Thick;Thin (01/09/18 0400)    Mobility Group  Activity Performed: Cardiac chair (01/09/18 0400)  Time Activity Tolerated: 120 min (01/09/18 0400)  Distance Per Occurrence (ft.): 0 feet (01/08/18 2000)  # of Times Distance was Traveled: 0 (01/08/18 2000)  Assistance / Tolerance: Assistance of Two or More;General Weakness (01/08/18 2000)  Pt Calls for Assistance: No (01/08/18 2000)  Staff Present for Mobilization: RN (01/08/18 2000)  Assistive Devices: Hand held assist (01/09/18 0400)    Events/Summary/Plan: Pt placed back on vent to rest (01/09/18 0130)

## 2018-01-10 ENCOUNTER — RESOLUTE PROFESSIONAL BILLING HOSPITAL PROF FEE (OUTPATIENT)
Dept: HOSPITALIST | Facility: MEDICAL CENTER | Age: 53
End: 2018-01-10
Payer: MEDICARE

## 2018-01-10 ENCOUNTER — APPOINTMENT (OUTPATIENT)
Dept: RADIOLOGY | Facility: MEDICAL CENTER | Age: 53
DRG: 003 | End: 2018-01-10
Attending: INTERNAL MEDICINE
Payer: MEDICARE

## 2018-01-10 VITALS
HEART RATE: 87 BPM | WEIGHT: 315 LBS | OXYGEN SATURATION: 94 % | RESPIRATION RATE: 22 BRPM | TEMPERATURE: 96.4 F | HEIGHT: 77 IN | DIASTOLIC BLOOD PRESSURE: 60 MMHG | SYSTOLIC BLOOD PRESSURE: 102 MMHG | BODY MASS INDEX: 37.19 KG/M2

## 2018-01-10 LAB
ANION GAP SERPL CALC-SCNC: 7 MMOL/L (ref 0–11.9)
BUN SERPL-MCNC: 56 MG/DL (ref 8–22)
CALCIUM SERPL-MCNC: 9 MG/DL (ref 8.5–10.5)
CHLORIDE SERPL-SCNC: 111 MMOL/L (ref 96–112)
CO2 SERPL-SCNC: 26 MMOL/L (ref 20–33)
CREAT SERPL-MCNC: 1.3 MG/DL (ref 0.5–1.4)
ERYTHROCYTE [DISTWIDTH] IN BLOOD BY AUTOMATED COUNT: 72.9 FL (ref 35.9–50)
GLUCOSE BLD-MCNC: 100 MG/DL (ref 65–99)
GLUCOSE BLD-MCNC: 117 MG/DL (ref 65–99)
GLUCOSE BLD-MCNC: 121 MG/DL (ref 65–99)
GLUCOSE SERPL-MCNC: 100 MG/DL (ref 65–99)
HCT VFR BLD AUTO: 33 % (ref 42–52)
HGB BLD-MCNC: 8.9 G/DL (ref 14–18)
INR PPP: 1.31 (ref 0.87–1.13)
MCH RBC QN AUTO: 23.8 PG (ref 27–33)
MCHC RBC AUTO-ENTMCNC: 27 G/DL (ref 33.7–35.3)
MCV RBC AUTO: 88.2 FL (ref 81.4–97.8)
PLATELET # BLD AUTO: 613 K/UL (ref 164–446)
PMV BLD AUTO: 11.1 FL (ref 9–12.9)
POTASSIUM SERPL-SCNC: 4.9 MMOL/L (ref 3.6–5.5)
PROTHROMBIN TIME: 16 SEC (ref 12–14.6)
RBC # BLD AUTO: 3.74 M/UL (ref 4.7–6.1)
SODIUM SERPL-SCNC: 144 MMOL/L (ref 135–145)
WBC # BLD AUTO: 17.1 K/UL (ref 4.8–10.8)

## 2018-01-10 PROCEDURE — 80048 BASIC METABOLIC PNL TOTAL CA: CPT

## 2018-01-10 PROCEDURE — 700102 HCHG RX REV CODE 250 W/ 637 OVERRIDE(OP): Performed by: INTERNAL MEDICINE

## 2018-01-10 PROCEDURE — 82962 GLUCOSE BLOOD TEST: CPT

## 2018-01-10 PROCEDURE — A9270 NON-COVERED ITEM OR SERVICE: HCPCS | Performed by: INTERNAL MEDICINE

## 2018-01-10 PROCEDURE — 700111 HCHG RX REV CODE 636 W/ 250 OVERRIDE (IP): Performed by: INTERNAL MEDICINE

## 2018-01-10 PROCEDURE — 71045 X-RAY EXAM CHEST 1 VIEW: CPT

## 2018-01-10 PROCEDURE — 85610 PROTHROMBIN TIME: CPT

## 2018-01-10 PROCEDURE — 700102 HCHG RX REV CODE 250 W/ 637 OVERRIDE(OP): Performed by: CLINICAL NURSE SPECIALIST

## 2018-01-10 PROCEDURE — 700102 HCHG RX REV CODE 250 W/ 637 OVERRIDE(OP): Performed by: THORACIC SURGERY (CARDIOTHORACIC VASCULAR SURGERY)

## 2018-01-10 PROCEDURE — 85027 COMPLETE CBC AUTOMATED: CPT

## 2018-01-10 PROCEDURE — A9270 NON-COVERED ITEM OR SERVICE: HCPCS | Performed by: CLINICAL NURSE SPECIALIST

## 2018-01-10 PROCEDURE — 99223 1ST HOSP IP/OBS HIGH 75: CPT | Mod: AI | Performed by: INTERNAL MEDICINE

## 2018-01-10 PROCEDURE — 94640 AIRWAY INHALATION TREATMENT: CPT

## 2018-01-10 PROCEDURE — A9270 NON-COVERED ITEM OR SERVICE: HCPCS | Performed by: THORACIC SURGERY (CARDIOTHORACIC VASCULAR SURGERY)

## 2018-01-10 RX ADMIN — QUETIAPINE FUMARATE 50 MG: 25 TABLET ORAL at 09:00

## 2018-01-10 RX ADMIN — SPIRONOLACTONE 50 MG: 25 TABLET, FILM COATED ORAL at 09:00

## 2018-01-10 RX ADMIN — CHLORHEXIDINE GLUCONATE 15 ML: 1.2 RINSE ORAL at 09:00

## 2018-01-10 RX ADMIN — ASPIRIN 81 MG: 81 TABLET, CHEWABLE ORAL at 09:00

## 2018-01-10 RX ADMIN — CEFDINIR 300 MG: 300 CAPSULE ORAL at 09:00

## 2018-01-10 RX ADMIN — FUROSEMIDE 40 MG: 20 TABLET ORAL at 06:00

## 2018-01-10 RX ADMIN — FAMOTIDINE 20 MG: 20 TABLET, FILM COATED ORAL at 09:00

## 2018-01-10 RX ADMIN — AMIODARONE HYDROCHLORIDE 400 MG: 200 TABLET ORAL at 09:00

## 2018-01-10 RX ADMIN — ENOXAPARIN SODIUM 120 MG: 150 INJECTION SUBCUTANEOUS at 09:00

## 2018-01-10 RX ADMIN — LISINOPRIL 10 MG: 10 TABLET ORAL at 09:00

## 2018-01-10 RX ADMIN — OXYCODONE HYDROCHLORIDE 5 MG: 10 TABLET ORAL at 03:25

## 2018-01-10 RX ADMIN — NYSTATIN 500000 UNITS: 100000 SUSPENSION ORAL at 09:00

## 2018-01-10 ASSESSMENT — ENCOUNTER SYMPTOMS
MUSCULOSKELETAL NEGATIVE: 1
GASTROINTESTINAL NEGATIVE: 1
CONSTITUTIONAL NEGATIVE: 1
EYES NEGATIVE: 1
RESPIRATORY NEGATIVE: 1
NEUROLOGICAL NEGATIVE: 1
CARDIOVASCULAR NEGATIVE: 1
PSYCHIATRIC NEGATIVE: 1

## 2018-01-10 NOTE — DISCHARGE PLANNING
Medical SW    Referral: Larry left message w/ liaisonIoana, ann marie AM.    Intervention: Larry requesting update for possible transport today to LT[facility] on [address] in [place].    Ioana can transport pt at 1pm. Larry completed new PCS and internal transport doc. [facility] Larry spoke to Halina. Larry updated charge RN. Pt to transport at 1PM.    Rounds: larry updated medical team as above.     -Larry met w/ LT[facility] marianneonIoana. Pt to transfer between 1130 and noon today. Per [facility], JACEY Copeland is asking for new transport documents. Larry completed two new transport documents and faxed to [facility]. Larry received fax confirmation. Larry called [facility] to advise as above.    Larry printed new transfer documents for the last 72 hours and replaced those from previous attempted d/c two days ago.     Plan: Larry to assist w/ d/c planning as needed.

## 2018-01-10 NOTE — DISCHARGE SUMMARY
Addendum:  Patient transferred postponed due to staffing at [facility].  Should go today.  No changes from last discharge summary.

## 2018-01-10 NOTE — PROGRESS NOTES
Pulmonary Critical Care Progress Note      DOS:  1/10/18, admit 12/19/2017  Reason for visit:  A&C respiratory failure, CAD s/p CABG, AF/flut, Low K/Mg, S/p trach 12/31  Chief Complaint: CAD, 2V CABG    History of Present Illness: 53 y/o M, h/o DM, COPD, DLD, CAD, SHASHANK, Obesity; underwent 2v CABG 12/22    Interval Events:  24 hour interval history reviewed    - no issues overnight   - alert and following all commands   - slept betters   - a-flutter 60-70s   - SBP 90-110s   - 3 BMs last night   - TFs at goal   - UOP of 2 liters with urinal   - Edge of bed   - trachday #11   - T-piece since 7am at 12-15 lpm at 40%   - CXR(reviewed) : mild pulm edema L>R   - Moving to [facility] today   - Omnicef day #2, but total abx of 9 days    Yesteday's Events:   - no overnight events   - Last BM today   - SR 90s   - SBPs 100-130s   - TFs at goal   - SBTs for 12 hours and will trial T-piece today   - No CXR today    Review of Systems   Constitutional: Negative.    HENT: Negative.    Eyes: Negative.    Respiratory: Negative.    Cardiovascular: Negative.    Gastrointestinal: Negative.    Genitourinary: Negative.    Musculoskeletal: Negative.    Skin: Negative.    Neurological: Negative.    Endo/Heme/Allergies: Negative.    Psychiatric/Behavioral: Negative.    has not communicated any complaints with an unchanged ROS    Physical Exam   Constitutional: He appears well-developed and well-nourished. No distress.   Awake/alert, following all commands   HENT:   Head: Normocephalic and atraumatic.   Mouth/Throat: Oropharynx is clear and moist.   Eyes: Conjunctivae and EOM are normal. Pupils are equal, round, and reactive to light. No scleral icterus.   Neck: Neck supple. No JVD present. No tracheal deviation present.   Cardiovascular: Normal rate, normal heart sounds and intact distal pulses.  Exam reveals no gallop and no friction rub.    No murmur heard.  Pulmonary/Chest: No respiratory distress. He has no rales.   Clear this morning throughout,  no wheezing.  Well healing trach site   Abdominal: Soft. He exhibits no distension. There is no tenderness. There is no rebound and no guarding.   Musculoskeletal: Normal range of motion. He exhibits no deformity.   Lymphadenopathy:     He has no cervical adenopathy.   Neurological: He is alert. No cranial nerve deficit.   Moving all extremities x 4, following all commands     Skin: Skin is warm and dry. No rash noted.   Psychiatric: He has a normal mood and affect.   Nursing note and vitals reviewed.  Exam remains grossly unchanged      PFSH:  No change.    Respiratory:  Leo Vent Mode: APVCMV, Rate (breaths/min): 26, Vt Target (mL): 540, PEEP/CPAP: 8, FiO2: 30, Static Compliance (ml / cm H2O): 50, Control VTE (exp VT): 566  Pulse Oximetry: 100 %     HemoDynamics:  Pulse: (!) 59, Heart Rate (Monitored): (!) 59  NIBP: (!) 94/56    Neuro:  GCS Total Enrique Coma Score: 14       Fluids:  Intake/Output       01/08/18 0700 - 01/09/18 0659 01/09/18 0700 - 01/10/18 0659 01/10/18 0700 - 01/11/18 0659      1147-8156 1827-5019 Total 8547-8610 5043-4831 Total 7512-9997 0298-1516 Total       Intake    P.O.  --  -- --  --  0 0  --  -- --    P.O. -- -- -- -- 0 0 -- -- --    Other  120  240 360  180  370 550  --  -- --    Medications (P.O./ Enteral Liquids) 120 240 360 180 370 550 -- -- --    Enteral  1200  1110 2310  860  850 1710  --  -- --    Enteral Volume 1020 1020 2040  -- -- --    Free Water / Tube Flush 180 90 270 180 0 180 -- -- --    Total Intake 1320 1350 2670 1040 1220 2260 -- -- --       Output    Urine  2300  1150 3450  300  750 1050  --  -- --    Number of Times Incontinent of Urine -- 1 x 1 x 3 x 1 x 4 x -- -- --    Void (ml) 2300 1150 3450  -- -- --    Stool  --  -- --  --  -- --  --  -- --    Number of Times Stooled 1 x 1 x 2 x -- 3 x 3 x -- -- --    Total Output 2300 1150 3450  -- -- --       Net I/O     -079 200 -095  -- -- --          Recent Labs       18   0423  18   0330   SODIUM  146*  145   POTASSIUM  4.3  5.1   CHLORIDE  109  110   CO2  28  26   BUN  48*  48*   CREATININE  1.08  1.23   CALCIUM  8.2*  8.8       GI/Nutrition:  TF goal  Liver Function  Recent Labs      18   0423  18   0330   PREALBUMIN  10.0*   --    GLUCOSE  106*  132*       Heme:  Recent Labs      18   1334  18   0423  18   0330   RBC   --   3.28*  3.59*   HEMOGLOBIN   --   7.7*  8.5*   HEMATOCRIT   --   28.3*  31.2*   PLATELETCT   --   558*  615*   PROTHROMBTM  15.4*  15.2*  14.0   INR  1.25*  1.23*  1.11       Infectious Disease:  Temp  Av.6 °C (97.9 °F)  Min: 36.5 °C (97.7 °F)  Max: 36.8 °C (98.2 °F)  Micro: reviewed  Recent Labs      18   033   WBC  16.0*  18.9*     Current Facility-Administered Medications   Medication Dose Frequency Provider Last Rate Last Dose   • potassium bicarbonate (KLYTE) 25 MEQ effervescent tablet TBEF 50 mEq  50 mEq BID Jodi Small M.D.   50 mEq at 18   • cefdinir (OMNICEF) capsule 300 mg  300 mg Q12HRS Jodi Small M.D.       • furosemide (LASIX) tablet 40 mg  40 mg BID DIURETIC Jodi Small M.D.   40 mg at 18 1656   • warfarin (COUMADIN) tablet 7.5 mg  7.5 mg COUMADIN-DAILY Kiel Ash M.D.   7.5 mg at 18 1746   • lisinopril (PRINIVIL) 10 MG tablet 10 mg  10 mg Q DAY Jodi Small M.D.   10 mg at 18 0846   • enoxaparin (LOVENOX) inj 120 mg  120 mg Q12HRS Silvio Eller M.D.   120 mg at 18   • spironolactone (ALDACTONE) tablet 50 mg  50 mg Q DAY Jerry Reyes M.D.   50 mg at 18 0846   • alprazolam (XANAX) tablet 0.5 mg  0.5 mg Q8HRS PRN Kali Evans Jr., D.O.   0.5 mg at 18   • MD ALERT... warfarin (COUMADIN) per pharmacy protocol   pharmacy to dose Silvio Eller M.D.       • quetiapine (SEROQUEL) tablet 50 mg  50 mg TID Silvio Eller M.D.   50 mg at 18   • nystatin  (MYCOSTATIN) 962523 UNIT/ML suspension 500,000 Units  5 mL 4X/DAY Silvio Eller M.D.   500,000 Units at 01/09/18 2050   • rosuvastatin (CRESTOR) tablet 40 mg  40 mg Q EVENING Jerry Reyes M.D.   40 mg at 01/09/18 2050   • digoxin (LANOXIN) tablet 250 mcg  250 mcg DAILY AT 1800 Jerry Reyes M.D.   250 mcg at 01/09/18 1656   • amiodarone (CORDARONE) tablet 400 mg  400 mg TWICE DAILY Gui Delcid M.D.   400 mg at 01/09/18 2051   • docusate sodium 100mg/10mL (COLACE) solution 100 mg  100 mg DAILY Silvia Preston A.P.N.   Stopped at 01/09/18 0900    Or   • docusate sodium (COLACE) capsule 100 mg  100 mg DAILY Silvia Preston A.P.N.   100 mg at 01/02/18 0831   • aspirin (ASA) chewable tab 81 mg  81 mg DAILY Kiel Ash M.D.   81 mg at 01/09/18 0845   • insulin NPH (HUMULIN,NOVOLIN) injection 5 Units  5 Units Q8HRS SUSI Mendoza.P.N.   5 Units at 01/09/18 2149   • famotidine (PEPCID) tablet 20 mg  20 mg BID Rodolfo Landa M.D.   20 mg at 01/09/18 2049   • chlorhexidine (PERIDEX) 0.12 % solution 15 mL  15 mL BID Rodolfo Landa M.D.   15 mL at 01/09/18 2050   • insulin lispro (HUMALOG) injection 0-15 Units  0-15 Units Q6HRS SUSI Alfred.P.N.   Stopped at 01/10/18 0000   • Respiratory Care per Protocol   Continuous RT SUSI Mendoza.P.N.       • Pharmacy Consult Request ...Pain Management Review 1 Each  1 Each PRN SUSI Mendoza.P.N.       • senna-docusate (PERICOLACE or SENOKOT S) 8.6-50 MG per tablet 1 Tab  1 Tab Nightly Silvia Preston A.P.N.   Stopped at 01/07/18 2100    And   • senna-docusate (PERICOLACE or SENOKOT S) 8.6-50 MG per tablet 1 Tab  1 Tab Q24HRS PRN Silvia Preston, A.P.N.   1 Tab at 12/28/17 0732    And   • lactulose 20 GM/30ML solution 30 mL  30 mL Q24HRS PRN Silvia Preston, A.P.N.   30 mL at 01/03/18 0912    And   • bisacodyl (DULCOLAX) suppository 10 mg  10 mg Q24HRS PRN Silvia Preston, A.P.N.   10 mg at 01/03/18 0912    And   • fleet enema 133 mL  1 Each Once  PRN Silvia Preston, A.P.N.       • oxycodone immediate release (ROXICODONE) tablet 5 mg  5 mg Q3HRS PRN Silvia Preston A.P.N.   5 mg at 01/10/18 0325   • oxycodone immediate release (ROXICODONE) tablet 10 mg  10 mg Q3HRS PRN Silvia Preston, A.P.N.   10 mg at 01/09/18 2108   • tramadol (ULTRAM) 50 MG tablet 50 mg  50 mg Q4HRS PRN Silvia Preston, A.P.N.   50 mg at 01/06/18 2049   • ondansetron (ZOFRAN) syringe/vial injection 4 mg  4 mg Q6HRS PRN Silvia Preston A.P.N.        Or   • prochlorperazine (COMPAZINE) injection 10 mg  10 mg Q6HRS PRN Silvia Preston, A.P.N.        Or   • promethazine (PHENERGAN) suppository 25 mg  25 mg Q6HRS PRN Silvia Preston A.P.N.       • acetaminophen (TYLENOL) tablet 650 mg  650 mg Q4HRS PRN Silvia Preston A.P.N.   650 mg at 01/01/18 1840    Or   • acetaminophen (TYLENOL) suppository 650 mg  650 mg Q4HRS PRN Silvia Preston, A.P.N.       • mag hydrox-al hydrox-simeth (MAALOX PLUS ES or MYLANTA DS) suspension 30 mL  30 mL Q4HRS PRN Silvia Preston A.P.N.   30 mL at 01/01/18 0753   • diphenhydrAMINE (BENADRYL) tablet/capsule 25 mg  25 mg HS PRN - MR X 1 Silvia Preston A.P.N.   25 mg at 01/08/18 1957   • albuterol inhaler 2 Puff  2 Puff Q4H PRN (RT) Kiel Ash M.D.       • ipratropium-albuterol (DUONEB) nebulizer solution 3 mL  3 mL Q4H PRN (RT) Kiel Ash M.D.   Stopped at 01/08/18 1812     Last reviewed on 12/22/2017  7:07 AM by Katie Aly R.N.    Quality  Measures:  Labs reviewed, Medications reviewed and Radiology images reviewed  Ceballos catheter: No Ceballos      DVT Prophylaxis: Enoxaparin (Lovenox) and Warfarin (Coumadin)  DVT prophylaxis - mechanical: SCDs  Ulcer prophylaxis: Yes  Antibiotics: Treating active infection/contamination beyond 24 hours perioperative coverage        Assessment/Plan:  Status post 2-vessel coronary artery bypass grafting on 12/22/2017.   - s/p IABP    - vasopressors off 1/4   - chest tubes out   - cont lasix PO   - no further  ectopy  Acute hypoxic respiratory failure secondary to above.   - intubated 12/22   - cont T-piece/CPAP trials and rest on vent at night if needed   - s/p trach 12/31   - LTACH today   - cont mobilization  ABN CXR - bilateral effusion/atx    - L effusion worse - L thoracentesis -1/3   - BAL 1/3 fluid growing Klebsiella oxytoca, 7 days ceftriaxone treatment: d/c on 1/12 (changed to oral omnicef to complete treatment)   - Aggressive pulmonary toilet and mobilize as tolerated to help prevent above  AF/RVR   - Amiodarone--> wean to 200mg enterally on 1/15 per cardiology   - Digoxin   - Anti-coag   - ERP keep K > 4 and Mg > 2  Coronary artery disease with multivessel disease - ASA/full antiocoag  Pneumonia   - MSSA and Klebsiella identified   - Antibiotics de-escalated to Ancef and changed to Omnicef today to complete 10 day course  Cardiomyopathy EF 35% and global systolic dysfunction    Severely dilated right ventricle - caution with volume excess  Chronic obstructive pulmonary disease - RT protocosl  Reported diabetes.   - ssi/NPH and q6 FS - adjust dosing daily at Team rounds  Dyslipidemia - statin  Clinically with obstructive sleep apnea - slow removal of trach - PSG later?  Agitation secondary to above - Seroquel trial/titrate dose over next several days   Prophylaxis - ABCDEF bundle    Transfer to LTAC today.  Pt's exam, assessment, and plan are unchanged    Discussed patient condition and risk of morbidity and/or mortality with Family, RN, RT, Pharmacy, Charge nurse / hot rounds and [facility].    [id number]

## 2018-01-10 NOTE — PROGRESS NOTES
Report called to [facility] nurse. All questions answered. Discharge paperwork went over with pt and wife at bedside

## 2018-01-10 NOTE — CARE PLAN
Problem: Safety  Goal: Will remain free from injury    Intervention: Provide assistance with mobility  3 healthcare providers required to turn/reposition/mobilize.       Problem: Knowledge Deficit  Goal: Knowledge of disease process/condition, treatment plan, diagnostic tests, and medications will improve    Intervention: Explain information regarding disease process/condition, treatment plan, diagnostic tests, and medications and document in education  Medications, equipment explained. No evidence of learning.

## 2018-01-10 NOTE — PROGRESS NOTES
Inpatient Anticoagulation Service Note    Date: 1/9/2018  Reason for Anticoagulation: Atrial Fibrillation, Deep Vein Thrombosis   Hemoglobin Value: (!) 8.5  Hematocrit Value: (!) 31.2  Lab Platelet Value: (!) 615  Target INR: 2.0 to 3.0    INR from last 7 days     Date/Time INR Value    01/09/18 0330  1.11    01/08/18 0423 (!)  1.23    01/07/18 1334 (!)  1.25    01/06/18 1130 (!)  1.2        Dose from last 7 days     Date/Time Dose (mg)    01/09/18 1600  7.5 mg PO daily    01/08/18 1500  7.5    01/07/18 1400  5    01/06/18 1600  5        Average Dose (mg):  (new start)  Significant Interactions: Aspirin, Amiodarone  Bridge Therapy: Yes (Lovenox 120 mg SC BID)  Date of Last VTE Event: 12/28/18  Bridge Therapy Start Date: 01/06/18  Days of Overlap Therapy: 3     Comments: INR down today. Will start increased dose today. If INR not up tomorrow, query if interaction with TF. INR in AM. No bleeding noted. Plan to go to [facility] once bed and RN available.    Education Material Provided?: No  Pharmacist suggested discharge dosing: would try Warfarin 5 mg PO daily at this point and follow up INR within 72 hours of discharge     Jason Aceves, PharmD, BCPS

## 2018-01-10 NOTE — PROGRESS NOTES
Report given to [facility] for transport. Paper work given to [facility] and all belongings with pt. Wife went with pt to [facility]

## 2018-01-10 NOTE — DISCHARGE PLANNING
Received transportation request from IFRAH Warren to [facility] Call placed to [facility] with [facility] and transport time arranged for 1300.     IFRAH Warren has been notified via phone.

## 2018-01-10 NOTE — DISCHARGE INSTRUCTIONS
Discharge Instructions    Discharged to other by ambulance with relative. Discharged via ambulance, hospital escort: Yes.  Special equipment needed: Oxygen    Be sure to schedule a follow-up appointment with your primary care doctor or any specialists as instructed.     Discharge Plan:   Diet Plan: Discussed  Activity Level: Discussed  Smoking Cessation Offered: Patient Counseled  Confirmed Follow up Appointment: No (Comments) (Continuation of care at [facility])  Confirmed Symptoms Management: Discussed  Medication Reconciliation Updated: Yes  Influenza Vaccine Indication: Patient Refuses    I understand that a diet low in cholesterol, fat, and sodium is recommended for good health. Unless I have been given specific instructions below for another diet, I accept this instruction as my diet prescription.   Other diet: Tube feeding    Special Instructions:   [facility] Cardiac Surgery Discharge Instructions    Activity:  1. NO driving for 4 weeks after surgery. You may ride as a passenger.  2. NO lifting of any item over 10 lbs (e.g. gallon of milk) for 6 weeks after surgery.  3. Walk as much as possible! Walk a minimum of once a day. Depending on your fatigue and comfort level, you may walk as much as you wish. There is no maximum.  4. Other physical activities (sex, housework, gardening, etc.) are OK after 4 weeks or after 8 weeks if you want to golf (start by putting, then advance to chipping, then to driving).  5. Continue using incentive spirometer for 2 weeks, especially if going home on oxygen.  6. Weigh daily and write it down starting  the next morning after you arrive home. Call your doctor for a weight gain of 2-4 lbs in 1-2 days.    Incision Care:  1. SHOWER ONLY - no baths. Clean incision(s) daily with plain soap or any other dye or perfume free soap. Then pat incision(s) dry with clean towel. Avoid creams or lotions on the incision(s).  2. If there is any increase in redness or swelling, or  separation of the incision line, or thick drainage* from any of the incisions, call [facility] ([phone]). * Clear, thin drainage is not abnormal especially from the leg incision and/or chest tube sites.                    3. Continue to wear your LOUIS Stockings for 4 weeks on the leg(s) with the incision(s) or swelling. You may take off the stocking(s) when in bed or when the leg(s) are elevated.    Prescription Refills/Questions: Call your usual Pharmacy for ALL refills   (Remember that refills may require additional physician approval and will need at least 24 hours' notice. Please call for refills on Mondays through Fridays from 9 am to 4 pm).    1. Pain medication questions only: Call [facility] ([phone]).  2. For all other medications (except pain medication): Call your Cardiology Group or Primary Care Doctor (not [facility]) for refills or questions.    General Instructions:  1. If you are on the blood thinner Coumadin (warfarin), you will need your coumadin levels checked periodically. For any questions about scheduling, ask your Cardiology Group or your Home Health Nurse whether this has been arranged for you.     2. Cardiac Rehab is highly recommended. If you do not have an appointment at the time of discharge call Carlos @ [phone] to schedule your initial interview.      3. Your Primary Care Doctor typically handles Home Oxygen. The Home Oxygen service that drops off your tanks should be checking your oxygen saturation levels. Oxygen may be stopped when you are > 90 % saturated on room air. Check with your Primary Care Doctor if you are unsure.    [facility] IS ALWAYS AVAILABLE TO ANSWER YOUR QUESTIONS. DO NOT HESITATE TO CALL!      · Is patient discharged on Warfarin / Coumadin?   Yes    You are receiving the drug warfarin. Please understand the importance of monitoring warfarin with scheduled PT/INR blood draws.  Follow-up with the [facility] in  "one week for INR lab..    IMPORTANT: HOW TO USE THIS INFORMATION:  This is a summary and does NOT have all possible information about this product. This information does not assure that this product is safe, effective, or appropriate for you. This information is not individual medical advice and does not substitute for the advice of your health care professional. Always ask your health care professional for complete information about this product and your specific health needs.      WARFARIN - ORAL (WARF-uh-rin)      COMMON BRAND NAME(S): Coumadin      WARNING:  Warfarin can cause very serious (possibly fatal) bleeding. This is more likely to occur when you first start taking this medication or if you take too much warfarin. To decrease your risk for bleeding, your doctor or other health care provider will monitor you closely and check your lab results (INR test) to make sure you are not taking too much warfarin. Keep all medical and laboratory appointments. Tell your doctor right away if you notice any signs of serious bleeding. See also Side Effects section.      USES:  This medication is used to treat blood clots (such as in deep vein thrombosis-DVT or pulmonary embolus-PE) and/or to prevent new clots from forming in your body. Preventing harmful blood clots helps to reduce the risk of a stroke or heart attack. Conditions that increase your risk of developing blood clots include a certain type of irregular heart rhythm (atrial fibrillation), heart valve replacement, recent heart attack, and certain surgeries (such as hip/knee replacement). Warfarin is commonly called a \"blood thinner,\" but the more correct term is \"anticoagulant.\" It helps to keep blood flowing smoothly in your body by decreasing the amount of certain substances (clotting proteins) in your blood.      HOW TO USE:  Read the Medication Guide provided by your pharmacist before you start taking warfarin and each time you get a refill. If you have any " questions, ask your doctor or pharmacist. Take this medication by mouth with or without food as directed by your doctor or other health care professional, usually once a day. It is very important to take it exactly as directed. Do not increase the dose, take it more frequently, or stop using it unless directed by your doctor. Dosage is based on your medical condition, laboratory tests (such as INR), and response to treatment. Your doctor or other health care provider will monitor you closely while you are taking this medication to determine the right dose for you. Use this medication regularly to get the most benefit from it. To help you remember, take it at the same time each day. It is important to eat a balanced, consistent diet while taking warfarin. Some foods can affect how warfarin works in your body and may affect your treatment and dose. Avoid sudden large increases or decreases in your intake of foods high in vitamin K (such as broccoli, cauliflower, cabbage, brussels sprouts, kale, spinach, and other green leafy vegetables, liver, green tea, certain vitamin supplements). If you are trying to lose weight, check with your doctor before you try to go on a diet. Cranberry products may also affect how your warfarin works. Limit the amount of cranberry juice (16 ounces/480 milliliters a day) or other cranberry products you may drink or eat.      SIDE EFFECTS:  Nausea, loss of appetite, or stomach/abdominal pain may occur. If any of these effects persist or worsen, tell your doctor or pharmacist promptly. Remember that your doctor has prescribed this medication because he or she has judged that the benefit to you is greater than the risk of side effects. Many people using this medication do not have serious side effects. This medication can cause serious bleeding if it affects your blood clotting proteins too much (shown by unusually high INR lab results). Even if your doctor stops your medication, this risk of  bleeding can continue for up to a week. Tell your doctor right away if you have any signs of serious bleeding, including: unusual pain/swelling/discomfort, unusual/easy bruising, prolonged bleeding from cuts or gums, persistent/frequent nosebleeds, unusually heavy/prolonged menstrual flow, pink/dark urine, coughing up blood, vomit that is bloody or looks like coffee grounds, severe headache, dizziness/fainting, unusual or persistent tiredness/weakness, bloody/black/tarry stools, chest pain, shortness of breath, difficulty swallowing. Tell your doctor right away if any of these unlikely but serious side effects occur: persistent nausea/vomiting, severe stomach/abdominal pain, yellowing eyes/skin. This drug rarely has caused very serious (possibly fatal) problems if its effects lead to small blood clots (usually at the beginning of treatment). This can lead to severe skin/tissue damage that may require surgery or amputation if left untreated. Patients with certain blood conditions (protein C or S deficiency) may be at greater risk. Get medical help right away if any of these rare but serious side effects occur: painful/red/purplish patches on the skin (such as on the toe, breast, abdomen), change in the amount of urine, vision changes, confusion, slurred speech, weakness on one side of the body. A very serious allergic reaction to this drug is rare. However, get medical help right away if you notice any symptoms of a serious allergic reaction, including: rash, itching/swelling (especially of the face/tongue/throat), severe dizziness, trouble breathing. This is not a complete list of possible side effects. If you notice other effects not listed above, contact your doctor or pharmacist. In the [place] - Call your doctor for medical advice about side effects. You may report side effects to FDA at 8-429-FDA-6486. In [place] - Call your doctor for medical advice about side effects. You may report side effects to [employer] at  8-386-445-7462.      PRECAUTIONS:  Before taking warfarin, tell your doctor or pharmacist if you are allergic to it; or if you have any other allergies. This product may contain inactive ingredients, which can cause allergic reactions or other problems. Talk to your pharmacist for more details. Before using this medication, tell your doctor or pharmacist your medical history, especially of: blood disorders (such as anemia, hemophilia), bleeding problems (such as bleeding of the stomach/intestines, bleeding in the brain), blood vessel disorders (such as aneurysms), recent major injury/surgery, liver disease, alcohol use, mental/mood disorders (including memory problems), frequent falls/injuries. It is important that all your doctors and dentists know that you take warfarin. Before having surgery or any medical/dental procedures, tell your doctor or dentist that you are taking this medication and about all the products you use (including prescription drugs, nonprescription drugs, and herbal products). Avoid getting injections into the muscles. If you must have an injection into a muscle (for example, a flu shot), it should be given in the arm. This way, it will be easier to check for bleeding and/or apply pressure bandages. This medication may cause stomach bleeding. Daily use of alcohol while using this medicine will increase your risk for stomach bleeding and may also affect how this medication works. Limit or avoid alcoholic beverages. If you have not been eating well, if you have an illness or infection that causes fever, vomiting, or diarrhea for more than 2 days, or if you start using any antibiotic medications, contact your doctor or pharmacist immediately because these conditions can affect how warfarin works. This medication can cause heavy bleeding. To lower the chance of getting cut, bruised, or injured, use great caution with sharp objects like safety razors and nail cutters. Use an electric razor when  shaving and a soft toothbrush when brushing your teeth. Avoid activities such as contact sports. If you fall or injure yourself, especially if you hit your head, call your doctor immediately. Your doctor may need to check you. The Food & Drug Administration has stated that generic warfarin products are interchangeable. However, consult your doctor or pharmacist before switching warfarin products. Be careful not to take more than one medication that contains warfarin unless specifically directed by the doctor or health care provider who is monitoring your warfarin treatment. Older adults may be at greater risk for bleeding while using this drug. This medication is not recommended for use during pregnancy because of serious (possibly fatal) harm to an unborn baby. Discuss the use of reliable forms of birth control with your doctor. If you become pregnant or think you may be pregnant, tell your doctor immediately. If you are planning pregnancy, discuss a plan for managing your condition with your doctor before you become pregnant. Your doctor may switch the type of medication you use during pregnancy. Very small amounts of this medication may pass into breast milk but is unlikely to harm a nursing infant. Consult your doctor before breast-feeding.      DRUG INTERACTIONS:  Drug interactions may change how your medications work or increase your risk for serious side effects. This document does not contain all possible drug interactions. Keep a list of all the products you use (including prescription/nonprescription drugs and herbal products) and share it with your doctor and pharmacist. Do not start, stop, or change the dosage of any medicines without your doctor's approval. Warfarin interacts with many prescription, nonprescription, vitamin, and herbal products. This includes medications that are applied to the skin or inside the vagina or rectum. The interactions with warfarin usually result in an increase or decrease  "in the \"blood-thinning\" (anticoagulant) effect. Your doctor or other health care professional should closely monitor you to prevent serious bleeding or clotting problems. While taking warfarin, it is very important to tell your doctor or pharmacist of any changes in medications, vitamins, or herbal products that you are taking. Some products that may interact with this drug include: capecitabine, imatinib, mifepristone. Aspirin, aspirin-like drugs (salicylates), and nonsteroidal anti-inflammatory drugs (NSAIDs such as ibuprofen, naproxen, celecoxib) may have effects similar to warfarin. These drugs may increase the risk of bleeding problems if taken during treatment with warfarin. Carefully check all prescription/nonprescription product labels (including drugs applied to the skin such as pain-relieving creams) since the products may contain NSAIDs or salicylates. Talk to your doctor about using a different medication (such as acetaminophen) to treat pain/fever. Low-dose aspirin and related drugs (such as clopidogrel, ticlopidine) should be continued if prescribed by your doctor for specific medical reasons such as heart attack or stroke prevention. Consult your doctor or pharmacist for more details. Many herbal products interact with warfarin. Tell your doctor before taking any herbal products, especially bromelains, coenzyme Q10, cranberry, danshen, dong quai, fenugreek, garlic, ginkgo biloba, ginseng, and Mexican Hat's wort, among others. This medication may interfere with a certain laboratory test to measure theophylline levels, possibly causing false test results. Make sure laboratory personnel and all your doctors know you use this drug.      OVERDOSE:  If overdose is suspected, contact a poison control center or emergency room immediately. [place] residents can call the [place] National Poison Hotline at 1-149.637.8805. [place] residents can call a provincial poison control center. Symptoms of overdose may include: " bloody/black/tarry stools, pink/dark urine, unusual/prolonged bleeding.      NOTES:  Do not share this medication with others. Laboratory and/or medical tests (such as INR, complete blood count) must be performed periodically to monitor your progress or check for side effects. Consult your doctor for more details.      MISSED DOSE:  For the best possible benefit, do not miss any doses. If you do miss a dose and remember on the same day, take it as soon as you remember. If you remember on the next day, skip the missed dose and resume your usual dosing schedule. Do not double the dose to catch up because this could increase your risk for bleeding. Keep a record of missed doses to give to your doctor or pharmacist. Contact your doctor or pharmacist if you miss 2 or more doses in a row.      STORAGE:  Store at room temperature away from light and moisture. Do not store in the bathroom. Keep all medications away from children and pets. Do not flush medications down the toilet or pour them into a drain unless instructed to do so. Properly discard this product when it is  or no longer needed. Consult your pharmacist or local waste disposal company for more details about how to safely discard your product.      MEDICAL ALERT:  Your condition and medication can cause complications in a medical emergency. For information about enrolling in MedicAlert, call 1-140.344.8872 ([place]) or 1-770.875.1515 ([place]).      Information last revised 2010 Copyright(c)  [employer]             · Is patient Post Blood Transfusion?  Yes  POST BLOOD TRANSFUSION INFORMATION (ADULT)    The purpose of blood transfusion is to correct anemia, low levels of blood clotting factors or to correct acute blood loss.   Blood transfusion is very safe but occasionally unexpected adverse reactions do occur. Most adverse reactions occur during transfusion, within one to two days following transfusion or one to two weeks following  transfusion. Some adverse reactions can occur one to six months after transfusion and some even years later.             If any of the symptoms listed below happen to you during your transfusion,                                 please notify your nurse immediately.   · Fever or Chills  · Flushing of the Face  · Hives, rash or itching  · Difficulty in breathing or shortness of breath  · Pain or oozing of blood from the IV needle site  · Low back pain  · Nausea or vomiting  · Weakness or fainting  · Chest pain  · Blood in the urine  · Decreased frequency of urination    The above symptoms may also occur within 24 to 48 after transfusion; if so, notify your physician.     · Yellowing of the skin    This can happen one to six months after transfusion; if so, notify your physician    Depression / Suicide Risk    As you are discharged from this [facility] facility, it is important to learn how to keep safe from harming yourself.    Recognize the warning signs:  · Abrupt changes in personality, positive or negative- including increase in energy   · Giving away possessions  · Change in eating patterns- significant weight changes-  positive or negative  · Change in sleeping patterns- unable to sleep or sleeping all the time   · Unwillingness or inability to communicate  · Depression  · Unusual sadness, discouragement and loneliness  · Talk of wanting to die  · Neglect of personal appearance   · Rebelliousness- reckless behavior  · Withdrawal from people/activities they love  · Confusion- inability to concentrate     If you or a loved one observes any of these behaviors or has concerns about self-harm, here's what you can do:  · Talk about it- your feelings and reasons for harming yourself  · Remove any means that you might use to hurt yourself (examples: pills, rope, extension cords, firearm)  · Get professional help from the community (Mental Health, Substance Abuse, psychological counseling)  · Do not be alone:Call your  Safe Contact- someone whom you trust who will be there for you.  · Call your local CRISIS HOTLINE 800-9169 or 235-713-1791  · Call your local Children's Mobile Crisis Response Team [place] (633) 262-7644 or www.[employer]  · Call the toll free National Suicide Prevention Hotlines   · National Suicide Prevention Lifeline 426-218-JTFZ (3422)  · National [employer] Line Network 800-SUICIDE (790-0235)

## 2018-01-10 NOTE — CARE PLAN
Problem: Oxygenation/Respiratory Function  Goal: Patient will Achieve/Maintain Optimum Respiratory Rate/Effort    Intervention: Administer/Titrate Oxygen as Ordered  Pt on Tpiece  Intervention: AIRWAY SUCTIONING  completed      Problem: Hemodynamic Status  Goal: Stable hemodynamics, hemostasis, fluid/electrolyte balance and rhythm control    Intervention: Cardiac Monitoring, Pulse Oximetry  completed  Intervention: Maintain SBP  mm Hg  completed

## 2018-01-10 NOTE — CARE PLAN
Problem: Ventilation Defect:  Goal: Ability to achieve and maintain unassisted ventilation or tolerate decreased levels of ventilator support    Intervention: Monitor ventilator weaning response  Adult Ventilation Update    Total Vent Days: 20    Patient Lines/Drains/Airways Status    Active Airway     Name: Placement date: Placement time: Site: Days:    Airway Group Standard Cuffed Trach Tracheostomy 8.0 12/31/17   1106   Tracheostomy   11              In the last 24 hours, the patient tolerated SBT for 4 hours & T-piece for 5 hours.    Cough: Productive (01/10/18 0233)  Sputum Amount: Small (01/10/18 0233)  Sputum Color: White;Clear (01/10/18 0233)  Sputum Consistency: Thick;Thin (01/10/18 0233)      Events/Summary/Plan: placed back on vent to rest (01/09/18 2116)

## 2018-01-10 NOTE — CARE PLAN
Problem: Ventilation Defect:  Goal: Ability to achieve and maintain unassisted ventilation or tolerate decreased levels of ventilator support    Intervention: Support and monitor invasive and noninvasive mechanical ventilation  Adult Ventilation Update    Total Vent Days: 19 Trach 10    Patient Lines/Drains/Airways Status    Active Airway     Name: Placement date: Placement time: Site: Days:    Airway Group Standard Cuffed Trach Tracheostomy 8.0 12/31/17   1106   Tracheostomy   9              In the last 24 hours, the patient tolerated SBT for 4 on settings of 5/8.    Cough: Productive (01/09/18 1200)  Sputum Amount: Moderate (01/09/18 1200)  Sputum Color: White;Clear (01/09/18 1200)  Sputum Consistency: Thick;Thin (01/09/18 1200)    Mobility Group  Activity Performed: Cardiac chair (01/09/18 0400)  Time Activity Tolerated: 120 min (01/09/18 0400)  Distance Per Occurrence (ft.): 0 feet (01/08/18 2000)  # of Times Distance was Traveled: 0 (01/08/18 2000)  Assistance / Tolerance: General Weakness (01/09/18 0800)  Pt Calls for Assistance: No (01/09/18 0800)  Staff Present for Mobilization: RN (01/08/18 2000)  Gait: Steady (12/21/17 2030)  Assistive Devices: Hand held assist (01/09/18 0600)  Reason Not Mobilized: Patient refused, education provided;Other (comment) (pt is combative) (01/09/18 1200)  Mobilization Comments:  (RASS +3 off sedation, pt. started swinging at me) (01/05/18 2000)    Events/Summary/Plan: Pt tolerated 4 hrs of SPONT in the AM, then placed on t-piece in the afternoon after rest on vent.  Currently tolerating 1 hour of trials.

## 2018-01-11 PROCEDURE — 99232 SBSQ HOSP IP/OBS MODERATE 35: CPT | Performed by: INTERNAL MEDICINE

## 2018-01-12 PROCEDURE — 99233 SBSQ HOSP IP/OBS HIGH 50: CPT | Performed by: INTERNAL MEDICINE

## 2018-01-13 PROCEDURE — 99232 SBSQ HOSP IP/OBS MODERATE 35: CPT | Performed by: INTERNAL MEDICINE

## 2018-01-14 PROCEDURE — 99232 SBSQ HOSP IP/OBS MODERATE 35: CPT | Performed by: INTERNAL MEDICINE

## 2018-01-15 LAB — LV EJECT FRACT  99904: 15

## 2018-01-15 PROCEDURE — 99233 SBSQ HOSP IP/OBS HIGH 50: CPT | Performed by: INTERNAL MEDICINE

## 2018-01-16 PROCEDURE — 99233 SBSQ HOSP IP/OBS HIGH 50: CPT | Performed by: HOSPITALIST

## 2018-01-17 PROCEDURE — 99233 SBSQ HOSP IP/OBS HIGH 50: CPT | Performed by: HOSPITALIST

## 2018-01-18 PROCEDURE — 99233 SBSQ HOSP IP/OBS HIGH 50: CPT | Performed by: HOSPITALIST

## 2018-01-19 PROCEDURE — 99233 SBSQ HOSP IP/OBS HIGH 50: CPT | Performed by: HOSPITALIST

## 2018-01-20 PROCEDURE — 99233 SBSQ HOSP IP/OBS HIGH 50: CPT | Performed by: HOSPITALIST

## 2018-01-21 PROCEDURE — 99233 SBSQ HOSP IP/OBS HIGH 50: CPT | Performed by: HOSPITALIST

## 2018-01-22 PROCEDURE — 99233 SBSQ HOSP IP/OBS HIGH 50: CPT | Performed by: HOSPITALIST

## 2018-01-23 ENCOUNTER — APPOINTMENT (OUTPATIENT)
Dept: RADIOLOGY | Facility: MEDICAL CENTER | Age: 53
DRG: 163 | End: 2018-01-23
Attending: INTERNAL MEDICINE
Payer: MEDICARE

## 2018-01-23 ENCOUNTER — HOSPITAL ENCOUNTER (INPATIENT)
Facility: MEDICAL CENTER | Age: 53
LOS: 9 days | DRG: 163 | End: 2018-02-01
Attending: HOSPITALIST | Admitting: INTERNAL MEDICINE
Payer: MEDICARE

## 2018-01-23 ENCOUNTER — HOSPITAL ENCOUNTER (OUTPATIENT)
Facility: MEDICAL CENTER | Age: 53
DRG: 163 | End: 2018-01-23
Admitting: HOSPITALIST
Payer: MEDICARE

## 2018-01-23 ENCOUNTER — RESOLUTE PROFESSIONAL BILLING HOSPITAL PROF FEE (OUTPATIENT)
Dept: HOSPITALIST | Facility: MEDICAL CENTER | Age: 53
End: 2018-01-23
Payer: MEDICARE

## 2018-01-23 DIAGNOSIS — J90 PLEURAL EFFUSION ON LEFT: ICD-10-CM

## 2018-01-23 DIAGNOSIS — I48.92 ATRIAL FLUTTER WITH RAPID VENTRICULAR RESPONSE (HCC): ICD-10-CM

## 2018-01-23 DIAGNOSIS — J96.01 ACUTE RESPIRATORY FAILURE WITH HYPOXIA (HCC): ICD-10-CM

## 2018-01-23 PROBLEM — I48.0 PAROXYSMAL ATRIAL FIBRILLATION (HCC): Status: ACTIVE | Noted: 2018-01-23

## 2018-01-23 PROCEDURE — 5A1945Z RESPIRATORY VENTILATION, 24-96 CONSECUTIVE HOURS: ICD-10-PCS | Performed by: INTERNAL MEDICINE

## 2018-01-23 PROCEDURE — 700102 HCHG RX REV CODE 250 W/ 637 OVERRIDE(OP): Performed by: INTERNAL MEDICINE

## 2018-01-23 PROCEDURE — 94002 VENT MGMT INPAT INIT DAY: CPT

## 2018-01-23 PROCEDURE — 700111 HCHG RX REV CODE 636 W/ 250 OVERRIDE (IP): Performed by: INTERNAL MEDICINE

## 2018-01-23 PROCEDURE — A9270 NON-COVERED ITEM OR SERVICE: HCPCS | Performed by: INTERNAL MEDICINE

## 2018-01-23 PROCEDURE — 87070 CULTURE OTHR SPECIMN AEROBIC: CPT

## 2018-01-23 PROCEDURE — 770022 HCHG ROOM/CARE - ICU (200)

## 2018-01-23 PROCEDURE — 71045 X-RAY EXAM CHEST 1 VIEW: CPT

## 2018-01-23 PROCEDURE — 87205 SMEAR GRAM STAIN: CPT

## 2018-01-23 PROCEDURE — 99223 1ST HOSP IP/OBS HIGH 75: CPT | Mod: AI | Performed by: INTERNAL MEDICINE

## 2018-01-23 PROCEDURE — 87040 BLOOD CULTURE FOR BACTERIA: CPT

## 2018-01-23 RX ORDER — POLYETHYLENE GLYCOL 3350 17 G/17G
1 POWDER, FOR SOLUTION ORAL
OUTPATIENT
Start: 2018-01-23

## 2018-01-23 RX ORDER — PROMETHAZINE HYDROCHLORIDE 25 MG/1
12.5-25 TABLET ORAL EVERY 4 HOURS PRN
Status: DISCONTINUED | OUTPATIENT
Start: 2018-01-23 | End: 2018-02-01 | Stop reason: HOSPADM

## 2018-01-23 RX ORDER — MORPHINE SULFATE 4 MG/ML
2 INJECTION, SOLUTION INTRAMUSCULAR; INTRAVENOUS EVERY 4 HOURS PRN
Status: DISCONTINUED | OUTPATIENT
Start: 2018-01-23 | End: 2018-02-01 | Stop reason: HOSPADM

## 2018-01-23 RX ORDER — POLYETHYLENE GLYCOL 3350 17 G/17G
1 POWDER, FOR SOLUTION ORAL
Status: DISCONTINUED | OUTPATIENT
Start: 2018-01-23 | End: 2018-02-01 | Stop reason: HOSPADM

## 2018-01-23 RX ORDER — SPIRONOLACTONE 25 MG/1
50 TABLET ORAL
Status: DISCONTINUED | OUTPATIENT
Start: 2018-01-24 | End: 2018-02-01 | Stop reason: HOSPADM

## 2018-01-23 RX ORDER — IPRATROPIUM BROMIDE AND ALBUTEROL SULFATE 2.5; .5 MG/3ML; MG/3ML
3 SOLUTION RESPIRATORY (INHALATION)
Status: DISCONTINUED | OUTPATIENT
Start: 2018-01-23 | End: 2018-01-23

## 2018-01-23 RX ORDER — BISACODYL 10 MG
10 SUPPOSITORY, RECTAL RECTAL
Status: DISCONTINUED | OUTPATIENT
Start: 2018-01-23 | End: 2018-02-01 | Stop reason: HOSPADM

## 2018-01-23 RX ORDER — LEVOFLOXACIN 5 MG/ML
500 INJECTION, SOLUTION INTRAVENOUS EVERY 24 HOURS
Status: DISCONTINUED | OUTPATIENT
Start: 2018-01-23 | End: 2018-01-23

## 2018-01-23 RX ORDER — PROMETHAZINE HYDROCHLORIDE 25 MG/1
12.5-25 TABLET ORAL EVERY 4 HOURS PRN
OUTPATIENT
Start: 2018-01-23

## 2018-01-23 RX ORDER — ONDANSETRON 2 MG/ML
4 INJECTION INTRAMUSCULAR; INTRAVENOUS EVERY 4 HOURS PRN
Status: DISCONTINUED | OUTPATIENT
Start: 2018-01-23 | End: 2018-02-01 | Stop reason: HOSPADM

## 2018-01-23 RX ORDER — AMOXICILLIN 250 MG
2 CAPSULE ORAL 2 TIMES DAILY
OUTPATIENT
Start: 2018-01-23

## 2018-01-23 RX ORDER — ONDANSETRON 4 MG/1
4 TABLET, ORALLY DISINTEGRATING ORAL EVERY 4 HOURS PRN
OUTPATIENT
Start: 2018-01-23

## 2018-01-23 RX ORDER — POLYETHYLENE GLYCOL 3350 17 G/17G
1 POWDER, FOR SOLUTION ORAL DAILY
Status: DISCONTINUED | OUTPATIENT
Start: 2018-01-24 | End: 2018-02-01 | Stop reason: HOSPADM

## 2018-01-23 RX ORDER — ASPIRIN 81 MG/1
81 TABLET, CHEWABLE ORAL DAILY
Status: DISCONTINUED | OUTPATIENT
Start: 2018-01-24 | End: 2018-02-01 | Stop reason: HOSPADM

## 2018-01-23 RX ORDER — DIVALPROEX SODIUM 125 MG/1
250 CAPSULE, COATED PELLETS ORAL EVERY 8 HOURS
Status: DISCONTINUED | OUTPATIENT
Start: 2018-01-23 | End: 2018-01-28

## 2018-01-23 RX ORDER — MICONAZOLE NITRATE 20 MG/G
CREAM TOPICAL EVERY 6 HOURS PRN
Status: DISCONTINUED | OUTPATIENT
Start: 2018-01-23 | End: 2018-02-01 | Stop reason: HOSPADM

## 2018-01-23 RX ORDER — AMOXICILLIN 250 MG
2 CAPSULE ORAL 2 TIMES DAILY
Status: DISCONTINUED | OUTPATIENT
Start: 2018-01-23 | End: 2018-02-01 | Stop reason: HOSPADM

## 2018-01-23 RX ORDER — IPRATROPIUM BROMIDE AND ALBUTEROL SULFATE 2.5; .5 MG/3ML; MG/3ML
3 SOLUTION RESPIRATORY (INHALATION)
Status: DISCONTINUED | OUTPATIENT
Start: 2018-01-23 | End: 2018-02-01 | Stop reason: HOSPADM

## 2018-01-23 RX ORDER — HALOPERIDOL 5 MG/ML
2-5 INJECTION INTRAMUSCULAR EVERY 4 HOURS PRN
Status: DISCONTINUED | OUTPATIENT
Start: 2018-01-23 | End: 2018-02-01 | Stop reason: HOSPADM

## 2018-01-23 RX ORDER — DIVALPROEX SODIUM 250 MG/1
250 TABLET, DELAYED RELEASE ORAL EVERY 8 HOURS
Status: DISCONTINUED | OUTPATIENT
Start: 2018-01-23 | End: 2018-01-23

## 2018-01-23 RX ORDER — AMIODARONE HYDROCHLORIDE 200 MG/1
200 TABLET ORAL
Status: DISCONTINUED | OUTPATIENT
Start: 2018-01-24 | End: 2018-02-01 | Stop reason: HOSPADM

## 2018-01-23 RX ORDER — FAMOTIDINE 20 MG/1
20 TABLET, FILM COATED ORAL 2 TIMES DAILY
Status: DISCONTINUED | OUTPATIENT
Start: 2018-01-23 | End: 2018-01-23

## 2018-01-23 RX ORDER — CHLORHEXIDINE GLUCONATE ORAL RINSE 1.2 MG/ML
15 SOLUTION DENTAL 2 TIMES DAILY
Status: DISCONTINUED | OUTPATIENT
Start: 2018-01-23 | End: 2018-01-23

## 2018-01-23 RX ORDER — ONDANSETRON 4 MG/1
4 TABLET, ORALLY DISINTEGRATING ORAL EVERY 4 HOURS PRN
Status: DISCONTINUED | OUTPATIENT
Start: 2018-01-23 | End: 2018-02-01 | Stop reason: HOSPADM

## 2018-01-23 RX ORDER — BISACODYL 10 MG
10 SUPPOSITORY, RECTAL RECTAL
Status: DISCONTINUED | OUTPATIENT
Start: 2018-01-23 | End: 2018-01-23

## 2018-01-23 RX ORDER — DIGOXIN 125 MCG
125 TABLET ORAL DAILY
Status: DISCONTINUED | OUTPATIENT
Start: 2018-01-23 | End: 2018-02-01 | Stop reason: HOSPADM

## 2018-01-23 RX ORDER — CHLORHEXIDINE GLUCONATE ORAL RINSE 1.2 MG/ML
15 SOLUTION DENTAL 2 TIMES DAILY
Status: DISCONTINUED | OUTPATIENT
Start: 2018-01-23 | End: 2018-02-01 | Stop reason: HOSPADM

## 2018-01-23 RX ORDER — QUETIAPINE FUMARATE 25 MG/1
50 TABLET, FILM COATED ORAL EVERY EVENING
Status: DISCONTINUED | OUTPATIENT
Start: 2018-01-23 | End: 2018-02-01 | Stop reason: HOSPADM

## 2018-01-23 RX ORDER — ACETAMINOPHEN 325 MG/1
650 TABLET ORAL EVERY 6 HOURS PRN
Status: DISCONTINUED | OUTPATIENT
Start: 2018-01-23 | End: 2018-02-01 | Stop reason: HOSPADM

## 2018-01-23 RX ORDER — NITROGLYCERIN 0.4 MG/1
0.4 TABLET SUBLINGUAL
Status: DISCONTINUED | OUTPATIENT
Start: 2018-01-23 | End: 2018-02-01 | Stop reason: HOSPADM

## 2018-01-23 RX ORDER — ONDANSETRON 2 MG/ML
4 INJECTION INTRAMUSCULAR; INTRAVENOUS EVERY 4 HOURS PRN
OUTPATIENT
Start: 2018-01-23

## 2018-01-23 RX ORDER — LIDOCAINE HYDROCHLORIDE 10 MG/ML
1-2 INJECTION, SOLUTION INFILTRATION; PERINEURAL
Status: DISCONTINUED | OUTPATIENT
Start: 2018-01-23 | End: 2018-02-01 | Stop reason: HOSPADM

## 2018-01-23 RX ORDER — FAMOTIDINE 20 MG/1
20 TABLET, FILM COATED ORAL EVERY 12 HOURS
Status: DISCONTINUED | OUTPATIENT
Start: 2018-01-23 | End: 2018-02-01 | Stop reason: HOSPADM

## 2018-01-23 RX ORDER — PROMETHAZINE HYDROCHLORIDE 25 MG/1
12.5-25 SUPPOSITORY RECTAL EVERY 4 HOURS PRN
OUTPATIENT
Start: 2018-01-23

## 2018-01-23 RX ORDER — AMOXICILLIN 250 MG
2 CAPSULE ORAL 2 TIMES DAILY
Status: DISCONTINUED | OUTPATIENT
Start: 2018-01-23 | End: 2018-01-23

## 2018-01-23 RX ORDER — PROMETHAZINE HYDROCHLORIDE 25 MG/1
12.5-25 SUPPOSITORY RECTAL EVERY 4 HOURS PRN
Status: DISCONTINUED | OUTPATIENT
Start: 2018-01-23 | End: 2018-02-01 | Stop reason: HOSPADM

## 2018-01-23 RX ORDER — OXYCODONE HYDROCHLORIDE 5 MG/1
5 TABLET ORAL EVERY 4 HOURS PRN
Status: DISCONTINUED | OUTPATIENT
Start: 2018-01-23 | End: 2018-02-01 | Stop reason: HOSPADM

## 2018-01-23 RX ORDER — TEMAZEPAM 15 MG/1
15 CAPSULE ORAL
Status: DISCONTINUED | OUTPATIENT
Start: 2018-01-23 | End: 2018-02-01 | Stop reason: HOSPADM

## 2018-01-23 RX ORDER — ALPRAZOLAM 0.5 MG/1
0.5 TABLET ORAL EVERY 6 HOURS PRN
Status: DISCONTINUED | OUTPATIENT
Start: 2018-01-23 | End: 2018-02-01 | Stop reason: HOSPADM

## 2018-01-23 RX ORDER — BISACODYL 10 MG
10 SUPPOSITORY, RECTAL RECTAL
OUTPATIENT
Start: 2018-01-23

## 2018-01-23 RX ORDER — ACETAMINOPHEN 325 MG/1
650 TABLET ORAL EVERY 6 HOURS PRN
OUTPATIENT
Start: 2018-01-23

## 2018-01-23 RX ORDER — ATORVASTATIN CALCIUM 40 MG/1
40 TABLET, FILM COATED ORAL
Status: DISCONTINUED | OUTPATIENT
Start: 2018-01-23 | End: 2018-02-01 | Stop reason: HOSPADM

## 2018-01-23 RX ORDER — POLYETHYLENE GLYCOL 3350 17 G/17G
1 POWDER, FOR SOLUTION ORAL
Status: DISCONTINUED | OUTPATIENT
Start: 2018-01-23 | End: 2018-01-23

## 2018-01-23 RX ORDER — LISINOPRIL 10 MG/1
10 TABLET ORAL
Status: DISCONTINUED | OUTPATIENT
Start: 2018-01-24 | End: 2018-02-01 | Stop reason: HOSPADM

## 2018-01-23 RX ADMIN — FAMOTIDINE 20 MG: 20 TABLET, FILM COATED ORAL at 20:26

## 2018-01-23 RX ADMIN — LEVOFLOXACIN 500 MG: 500 INJECTION, SOLUTION INTRAVENOUS at 18:03

## 2018-01-23 RX ADMIN — ATORVASTATIN CALCIUM 40 MG: 40 TABLET, FILM COATED ORAL at 20:25

## 2018-01-23 RX ADMIN — QUETIAPINE FUMARATE 50 MG: 25 TABLET ORAL at 20:25

## 2018-01-23 RX ADMIN — STANDARDIZED SENNA CONCENTRATE AND DOCUSATE SODIUM 2 TABLET: 8.6; 5 TABLET, FILM COATED ORAL at 20:25

## 2018-01-23 RX ADMIN — CHLORHEXIDINE GLUCONATE 15 ML: 1.2 RINSE ORAL at 20:24

## 2018-01-23 RX ADMIN — NYSTATIN 500000 UNITS: 100000 SUSPENSION ORAL at 20:24

## 2018-01-23 RX ADMIN — DIVALPROEX SODIUM 250 MG: 125 CAPSULE, COATED PELLETS ORAL at 21:35

## 2018-01-23 RX ADMIN — DIGOXIN 125 MCG: 125 TABLET ORAL at 20:25

## 2018-01-23 ASSESSMENT — ENCOUNTER SYMPTOMS
BRUISES/BLEEDS EASILY: 0
DIAPHORESIS: 0
MYALGIAS: 0
SHORTNESS OF BREATH: 1
TREMORS: 0
SPUTUM PRODUCTION: 0
NAUSEA: 0
FEVER: 0
COUGH: 1
HEARTBURN: 0
CONSTIPATION: 0
FOCAL WEAKNESS: 0
DIARRHEA: 0
HEADACHES: 0

## 2018-01-23 ASSESSMENT — PAIN SCALES - GENERAL
PAINLEVEL_OUTOF10: 0

## 2018-01-23 ASSESSMENT — PATIENT HEALTH QUESTIONNAIRE - PHQ9
2. FEELING DOWN, DEPRESSED, IRRITABLE, OR HOPELESS: NOT AT ALL
SUM OF ALL RESPONSES TO PHQ QUESTIONS 1-9: 0
SUM OF ALL RESPONSES TO PHQ9 QUESTIONS 1 AND 2: 0
1. LITTLE INTEREST OR PLEASURE IN DOING THINGS: NOT AT ALL

## 2018-01-23 NOTE — ASSESSMENT & PLAN NOTE
-Now status post left thoracoscopy, partial thoracoscopic decortication, mechanical and chemical pleurodesis  - Patient is improved postoperatively, now with good sats on T-piece  - 1 chest tube has been removed, the other is at water seal  -Surgery Dr Paez following

## 2018-01-23 NOTE — ASSESSMENT & PLAN NOTE
-Improved  - Continue amiodarone and digoxin, obtain digoxin level  - Avoid full dose anticoagulation at this time

## 2018-01-23 NOTE — PROGRESS NOTES
Patient is a direct admit from [facility].   Dr Espinoza is admitting the patient. Dr Gordon will see the patient while here. Dr Ash and Dr Blanco are on the case as consults.   ADT order signed and held, needs to be released when the patient arrives on the unit.

## 2018-01-23 NOTE — ASSESSMENT & PLAN NOTE
- Does have some edema, no need for Lasix at this time, last time Lasix was used he had worsening of his kidney function  - Most recent ejection fraction was 27%  - Medical management is able

## 2018-01-23 NOTE — H&P
Hospital Medicine History and Physical    Date of Service  1/23/2018    Chief Complaint  Recurrent left pleural effusion    History of Presenting Illness  52 y.o. male who presented with recurrent left pleural effusion. He was previously admitted to the hospital Dec 19 with acute heart failure and was found to have multivessel coronary artery disease on cardiac catheterization. He had two vessel coronary artery bypass graft surgery done on Dec 22 with Dr. Ash and developed acute respiratory failure after surgery. He required ventilator support for hypoxia and had a tracheostomy placed on Dec 30. A left side thoracentesis was done Palomo 3 and he did grow MSSA and klebsiella from sputum culture. He did finish rocephin for treatment of this on Jan 12. However, his left side effusion reaccumulated and he underwent thoracentsis two more times. The last thoracentesis was done Jan 21 with 400mL of bloody fluid removed and CT scan of the chest Jan22 showed rapid reaccumulation and again a large effusion. His hemoglobin did remain stable. The case was discussed with nurse practitioner Silvia Cook for Dr. Ash who agreed with transfer to [facility] for surgical evaluation for possible pleurodesis or VATS.     Primary Care Physician   None    Code Status  full    Review of Systems  Review of Systems   Constitutional: Negative for diaphoresis and fever.   HENT: Negative for congestion.    Respiratory: Positive for cough and shortness of breath. Negative for sputum production.    Cardiovascular: Negative for chest pain and leg swelling.   Gastrointestinal: Negative for constipation, diarrhea, heartburn and nausea.   Genitourinary: Negative for dysuria and urgency.   Musculoskeletal: Negative for myalgias.   Neurological: Negative for tremors, focal weakness and headaches.   Endo/Heme/Allergies: Does not bruise/bleed easily.          Past Medical History  Past Medical History:   Diagnosis Date   • ASTHMA    • Chronic  obstructive pulmonary disease (CMS-HCC)    • Diabetes    diabetes was diet controlled at home    Surgical History  Past Surgical History:   Procedure Laterality Date   • MULTIPLE CORONARY ARTERY BYPASS ENDO VEIN HARVEST  12/22/2017    Procedure: MULTIPLE CORONARY ARTERY BYPASS ENDO VEIN HARVEST X2;  Surgeon: Kiel Ash M.D.;  Location: SURGERY [facility];  Service: Cardiothoracic   • JIM  12/22/2017    Procedure: JIM;  Surgeon: Kiel Ash M.D.;  Location: SURGERY [facility];  Service: Cardiothoracic   • OTHER ABDOMINAL SURGERY         Medications  Amiodarone 200mg daily  Aspirin 81 mg daily  Atorvastatin 40mg daily  chorhexidine 15mL topically BID for oral care  Digoxin 0.125 mg daily  depakote 250mg tid  pepcid 20mg bid  Miconazole cream apply topically to rash bid  Nystatin oral suspension 5mL every 4 hours  miralax 1 pkg in 8 oz water daily  seroquel 50 mg nightly  Temazepam 15mg qpm  duoneb every 4 hours  Spironolactone 50mg daily  Levaquin 500mg iv daily through Jan 28 for pneumonia  Lisinopril 10 mg daily  Warfarin 5mg daily    AS NEEDED medication:  Tylenol 650mg every 4 hours as needed for pain  Alprazolam 0.5mg every 6 hours as needed for anxiety  Bisacodyl 10mg rectally as needed for constipation  Haldol 2-5 mg iv every 4 hours as needed for agitation  Lactulose 10mL daily as needed for constipation  Miconazole topically as needed for fungal rash  Morphine 2mg iv every 4 hours as needed for severe pain  Nitroglycerin 0.4 mg sublingually as needed for chest pain  Oxycodone 5mg every 4 hours as needed for moderate pain  Lidocaine 1-2mL nebulized as needed for cough    Family History  No early heart disease history    Social History  Social History   Substance Use Topics   • Smoking status: Current Every Day Smoker     Packs/day: 0.50     Years: 20.00     Types: Cigarettes   • Smokeless tobacco: Never Used   • Alcohol use No       Allergies  Allergies   Allergen Reactions   •  Erythromycin Anaphylaxis   • Cillins [Penicillins] Rash     As a child   • Shellfish Allergy Anaphylaxis     Pt stated that he is allergic to all seafood, will develop a rash and says that rash will clear up with benadryl        Physical Exam  Laboratory   Hemodynamics    Physical Exam   Constitutional: No distress.   HENT:   Mouth/Throat: Oropharynx is clear and moist.   Eyes: Conjunctivae are normal.   Neck: No JVD present. No tracheal deviation present.   Large neck circumference  Tracheostomy in place   Cardiovascular: Normal rate and regular rhythm.  PMI is displaced.    Pulses:       Dorsalis pedis pulses are 2+ on the right side, and 2+ on the left side.   Pulmonary/Chest: Accessory muscle usage present. He has decreased breath sounds. He has no wheezes.   Abdominal: Soft. Bowel sounds are normal. He exhibits no distension. There is no hepatosplenomegaly.   Musculoskeletal: He exhibits no edema.   Lymphadenopathy:        Right cervical: No superficial cervical and no posterior cervical adenopathy present.       Left cervical: No superficial cervical and no posterior cervical adenopathy present.        Right: No supraclavicular adenopathy present.        Left: No supraclavicular adenopathy present.   Neurological: He is alert.   Skin: Skin is warm and dry. No erythema.   Nursing note reviewed.              No results for input(s): ALTSGPT, ASTSGOT, ALKPHOSPHAT, TBILIRUBIN, DBILIRUBIN, GAMMAGT, AMYLASE, LIPASE, ALB, PREALBUMIN, GLUCOSE in the last 72 hours.                Imaging  Jan 22: CT of chest from [facility] shows large left effusion with evidence of fluid increase since thoracentesis done the day before   Assessment/Plan     I anticipate this patient will require at least two midnights for appropriate medical management, necessitating inpatient admission.    Acute respiratory failure with hypoxia (CMS-[facility])   Assessment & Plan    Continue ventilator support, respiratory  therapy  Likely will improve after effusion better controlled  Pulmonary medicine consulting        Paroxysmal atrial fibrillation (CMS-HCC)   Assessment & Plan    Controlled with patient in sinus rhythm now  Continue amiodarone and digoxin  No anticoagulation due to surgical procedure planned for recurrent effusion        Pleural effusion on left   Assessment & Plan    Pulmonary medicine aware, discussed with Dr. Monaco today  Also discussed with cardiothoracic surgery today and Dr. Ash will consult and plan for surgical evaluation for possible VATS        Acute systolic CHF (congestive heart failure) (CMS-HCC) ef 35%- (present on admission)   Assessment & Plan    Improved with diuresis however creatinine was elevated previously on lasix therapy  Will monitor for diuretic need  Digoxin to continue        Type 2 diabetes mellitus (CMS-HCC)- (present on admission)   Assessment & Plan    Diet controlled at home  Continue tube feeds here due to dysphagia with tracheostomy in place  Will monitor blood sugars  Well controlled with no need for insulin the last several days        NSTEMI (non-ST elevated myocardial infarction) (CMS-HCC)- (present on admission)   Assessment & Plan    Treated with 2 vessel CABG  Aspirin and statin for prevention of recurrent disease            VTE prophylaxis: scd's due to bleeding risk.

## 2018-01-24 ENCOUNTER — APPOINTMENT (OUTPATIENT)
Dept: RADIOLOGY | Facility: MEDICAL CENTER | Age: 53
DRG: 163 | End: 2018-01-24
Attending: INTERNAL MEDICINE
Payer: MEDICARE

## 2018-01-24 ENCOUNTER — APPOINTMENT (OUTPATIENT)
Dept: RADIOLOGY | Facility: MEDICAL CENTER | Age: 53
DRG: 163 | End: 2018-01-24
Attending: SURGERY
Payer: MEDICARE

## 2018-01-24 PROBLEM — E87.0 HYPERNATREMIA: Status: ACTIVE | Noted: 2018-01-24

## 2018-01-24 PROBLEM — D72.829 LEUKOCYTOSIS: Status: ACTIVE | Noted: 2018-01-24

## 2018-01-24 PROBLEM — I50.22 CHRONIC SYSTOLIC CHF (CONGESTIVE HEART FAILURE), NYHA CLASS 4 (HCC): Status: ACTIVE | Noted: 2017-11-23

## 2018-01-24 PROBLEM — D64.9 NORMOCYTIC ANEMIA: Status: ACTIVE | Noted: 2018-01-24

## 2018-01-24 LAB
ANION GAP SERPL CALC-SCNC: 12 MMOL/L (ref 0–11.9)
ANISOCYTOSIS BLD QL SMEAR: ABNORMAL
BASOPHILS # BLD AUTO: 0 % (ref 0–1.8)
BASOPHILS # BLD: 0 K/UL (ref 0–0.12)
BUN SERPL-MCNC: 47 MG/DL (ref 8–22)
CALCIUM SERPL-MCNC: 9.6 MG/DL (ref 8.5–10.5)
CHLORIDE SERPL-SCNC: 109 MMOL/L (ref 96–112)
CO2 SERPL-SCNC: 27 MMOL/L (ref 20–33)
CREAT SERPL-MCNC: 1.13 MG/DL (ref 0.5–1.4)
EOSINOPHIL # BLD AUTO: 0.42 K/UL (ref 0–0.51)
EOSINOPHIL NFR BLD: 2.6 % (ref 0–6.9)
ERYTHROCYTE [DISTWIDTH] IN BLOOD BY AUTOMATED COUNT: 65.1 FL (ref 35.9–50)
FUNGUS SPEC CULT: NORMAL
GLUCOSE SERPL-MCNC: 104 MG/DL (ref 65–99)
GRAM STN SPEC: NORMAL
HCT VFR BLD AUTO: 28 % (ref 42–52)
HGB BLD-MCNC: 7.4 G/DL (ref 14–18)
LYMPHOCYTES # BLD AUTO: 2 K/UL (ref 1–4.8)
LYMPHOCYTES NFR BLD: 12.3 % (ref 22–41)
MAGNESIUM SERPL-MCNC: 2.4 MG/DL (ref 1.5–2.5)
MANUAL DIFF BLD: NORMAL
MCH RBC QN AUTO: 22.2 PG (ref 27–33)
MCHC RBC AUTO-ENTMCNC: 26.4 G/DL (ref 33.7–35.3)
MCV RBC AUTO: 84.1 FL (ref 81.4–97.8)
METAMYELOCYTES NFR BLD MANUAL: 0.9 %
MICROCYTES BLD QL SMEAR: ABNORMAL
MONOCYTES # BLD AUTO: 0.57 K/UL (ref 0–0.85)
MONOCYTES NFR BLD AUTO: 3.5 % (ref 0–13.4)
MORPHOLOGY BLD-IMP: NORMAL
NEUTROPHILS # BLD AUTO: 13.15 K/UL (ref 1.82–7.42)
NEUTROPHILS NFR BLD: 73.7 % (ref 44–72)
NEUTS BAND NFR BLD MANUAL: 7 % (ref 0–10)
NRBC # BLD AUTO: 0.03 K/UL
NRBC BLD-RTO: 0.2 /100 WBC
OVALOCYTES BLD QL SMEAR: NORMAL
PHOSPHATE SERPL-MCNC: 3.4 MG/DL (ref 2.5–4.5)
PLATELET # BLD AUTO: 366 K/UL (ref 164–446)
PLATELET BLD QL SMEAR: NORMAL
PMV BLD AUTO: 10.7 FL (ref 9–12.9)
POIKILOCYTOSIS BLD QL SMEAR: NORMAL
POLYCHROMASIA BLD QL SMEAR: NORMAL
POTASSIUM SERPL-SCNC: 4.1 MMOL/L (ref 3.6–5.5)
RBC # BLD AUTO: 3.33 M/UL (ref 4.7–6.1)
RBC BLD AUTO: PRESENT
SIGNIFICANT IND 70042: NORMAL
SIGNIFICANT IND 70042: NORMAL
SITE SITE: NORMAL
SITE SITE: NORMAL
SODIUM SERPL-SCNC: 148 MMOL/L (ref 135–145)
SOURCE SOURCE: NORMAL
SOURCE SOURCE: NORMAL
WBC # BLD AUTO: 16.3 K/UL (ref 4.8–10.8)

## 2018-01-24 PROCEDURE — 85027 COMPLETE CBC AUTOMATED: CPT

## 2018-01-24 PROCEDURE — 84100 ASSAY OF PHOSPHORUS: CPT

## 2018-01-24 PROCEDURE — 700102 HCHG RX REV CODE 250 W/ 637 OVERRIDE(OP): Performed by: INTERNAL MEDICINE

## 2018-01-24 PROCEDURE — A9270 NON-COVERED ITEM OR SERVICE: HCPCS | Performed by: INTERNAL MEDICINE

## 2018-01-24 PROCEDURE — 71045 X-RAY EXAM CHEST 1 VIEW: CPT

## 2018-01-24 PROCEDURE — 94003 VENT MGMT INPAT SUBQ DAY: CPT

## 2018-01-24 PROCEDURE — 700111 HCHG RX REV CODE 636 W/ 250 OVERRIDE (IP): Performed by: INTERNAL MEDICINE

## 2018-01-24 PROCEDURE — 85007 BL SMEAR W/DIFF WBC COUNT: CPT

## 2018-01-24 PROCEDURE — 80048 BASIC METABOLIC PNL TOTAL CA: CPT

## 2018-01-24 PROCEDURE — 94640 AIRWAY INHALATION TREATMENT: CPT

## 2018-01-24 PROCEDURE — 700101 HCHG RX REV CODE 250: Performed by: INTERNAL MEDICINE

## 2018-01-24 PROCEDURE — 92522 EVALUATE SPEECH PRODUCTION: CPT

## 2018-01-24 PROCEDURE — 99233 SBSQ HOSP IP/OBS HIGH 50: CPT | Performed by: INTERNAL MEDICINE

## 2018-01-24 PROCEDURE — 83735 ASSAY OF MAGNESIUM: CPT

## 2018-01-24 PROCEDURE — G9172 VOICE GOAL STATUS: HCPCS | Mod: CH

## 2018-01-24 PROCEDURE — 71250 CT THORAX DX C-: CPT

## 2018-01-24 PROCEDURE — G9171 VOICE CURRENT STATUS: HCPCS | Mod: CK

## 2018-01-24 PROCEDURE — 770022 HCHG ROOM/CARE - ICU (200)

## 2018-01-24 RX ADMIN — IPRATROPIUM BROMIDE AND ALBUTEROL SULFATE 3 ML: .5; 3 SOLUTION RESPIRATORY (INHALATION) at 07:10

## 2018-01-24 RX ADMIN — HALOPERIDOL LACTATE 5 MG: 5 INJECTION, SOLUTION INTRAMUSCULAR at 23:28

## 2018-01-24 RX ADMIN — ALPRAZOLAM 0.5 MG: 0.5 TABLET ORAL at 20:55

## 2018-01-24 RX ADMIN — DIVALPROEX SODIUM 250 MG: 125 CAPSULE, COATED PELLETS ORAL at 20:56

## 2018-01-24 RX ADMIN — SPIRONOLACTONE 50 MG: 25 TABLET, FILM COATED ORAL at 08:03

## 2018-01-24 RX ADMIN — STANDARDIZED SENNA CONCENTRATE AND DOCUSATE SODIUM 2 TABLET: 8.6; 5 TABLET, FILM COATED ORAL at 07:56

## 2018-01-24 RX ADMIN — IPRATROPIUM BROMIDE AND ALBUTEROL SULFATE 3 ML: .5; 3 SOLUTION RESPIRATORY (INHALATION) at 22:44

## 2018-01-24 RX ADMIN — DIGOXIN 125 MCG: 125 TABLET ORAL at 17:34

## 2018-01-24 RX ADMIN — CHLORHEXIDINE GLUCONATE 15 ML: 1.2 RINSE ORAL at 20:55

## 2018-01-24 RX ADMIN — LISINOPRIL 10 MG: 10 TABLET ORAL at 08:03

## 2018-01-24 RX ADMIN — NYSTATIN 500000 UNITS: 100000 SUSPENSION ORAL at 20:55

## 2018-01-24 RX ADMIN — CHLORHEXIDINE GLUCONATE 15 ML: 1.2 RINSE ORAL at 07:56

## 2018-01-24 RX ADMIN — NYSTATIN 500000 UNITS: 100000 SUSPENSION ORAL at 17:34

## 2018-01-24 RX ADMIN — FAMOTIDINE 20 MG: 20 TABLET, FILM COATED ORAL at 08:03

## 2018-01-24 RX ADMIN — QUETIAPINE FUMARATE 50 MG: 25 TABLET ORAL at 20:55

## 2018-01-24 RX ADMIN — FAMOTIDINE 20 MG: 20 TABLET, FILM COATED ORAL at 20:55

## 2018-01-24 RX ADMIN — IPRATROPIUM BROMIDE AND ALBUTEROL SULFATE 3 ML: .5; 3 SOLUTION RESPIRATORY (INHALATION) at 11:07

## 2018-01-24 RX ADMIN — STANDARDIZED SENNA CONCENTRATE AND DOCUSATE SODIUM 2 TABLET: 8.6; 5 TABLET, FILM COATED ORAL at 20:55

## 2018-01-24 RX ADMIN — IPRATROPIUM BROMIDE AND ALBUTEROL SULFATE 3 ML: .5; 3 SOLUTION RESPIRATORY (INHALATION) at 14:44

## 2018-01-24 RX ADMIN — AMIODARONE HYDROCHLORIDE 200 MG: 200 TABLET ORAL at 08:03

## 2018-01-24 RX ADMIN — DIVALPROEX SODIUM 250 MG: 125 CAPSULE, COATED PELLETS ORAL at 05:59

## 2018-01-24 RX ADMIN — ATORVASTATIN CALCIUM 40 MG: 40 TABLET, FILM COATED ORAL at 20:55

## 2018-01-24 RX ADMIN — IPRATROPIUM BROMIDE AND ALBUTEROL SULFATE 3 ML: .5; 3 SOLUTION RESPIRATORY (INHALATION) at 18:53

## 2018-01-24 RX ADMIN — NYSTATIN 500000 UNITS: 100000 SUSPENSION ORAL at 07:56

## 2018-01-24 RX ADMIN — POLYETHYLENE GLYCOL 3350 1 PACKET: 17 POWDER, FOR SOLUTION ORAL at 07:57

## 2018-01-24 ASSESSMENT — PAIN SCALES - GENERAL
PAINLEVEL_OUTOF10: 0

## 2018-01-24 ASSESSMENT — LIFESTYLE VARIABLES
EVER_SMOKED: YES
REASON UNABLE TO ASSESS: TRACHED
REASON UNABLE TO ASSESS: TRACHED

## 2018-01-24 ASSESSMENT — PATIENT HEALTH QUESTIONNAIRE - PHQ9
SUM OF ALL RESPONSES TO PHQ9 QUESTIONS 1 AND 2: 0
SUM OF ALL RESPONSES TO PHQ QUESTIONS 1-9: 0
1. LITTLE INTEREST OR PLEASURE IN DOING THINGS: NOT AT ALL
2. FEELING DOWN, DEPRESSED, IRRITABLE, OR HOPELESS: NOT AT ALL

## 2018-01-24 NOTE — THERAPY
"Speech Language Therapy Evaluation completed to address speech    Functional Status:  Patient was seen on this date for a speaking valve assessment. RT and primary SLP at bedside for evaluation. Spouse also at bedside and upset that speech therapy did not address speaking valve orders during last visit. However, speaking valve orders were cancelled during last admit until patient was weaned off vent. Patient currently on the ventilator Spont Mode 5/10 at 30% FiO2 with stable vitals. Patient tolerated subglottic and tracheal suctioning with cough x1 and moderate secretions removed. Cuff was deflated of 10cc of air and speaking valve was placed in-line with the ventilator. Patient tolerated speaking valve placement for 30 minutes with vitals remaining stable and no increase in respiratory distress. Patient speaking predominately at the phrase level (4-5 word productions) and vocal quality was moderately hoarse and low in intensity. He was AAOx4. Speaking valve was removed and no tracheal suctioning performed 2/2 clear vocal quality and breath sounds. Cuff re-inflated by RT. Patient and spouse educated in regards to current status and SLP. OF NOTE: SLP in room noted a cup of water lodged at the inner edge of the bed. When asked patient if the cup of water was his, he pointed to his spouse and spouse reporting it was hers. There is concerns patient may be drinking water despite NPO recommendations.    Recommendations: At this time, recommend speaking valve to be placed by trained SLP and RT only for 30 minute intervals given direct supervision.    Plan of Care: Will benefit from Speech Therapy 3 times per week    Post-Acute Therapy: Discharge to a transitional care facility for continued skilled therapy services.    See \"Rehab Therapy-Acute\" Patient Summary Report for complete documentation. Thank you for the consult.            "

## 2018-01-24 NOTE — CARE PLAN
Problem: Communication  Goal: The ability to communicate needs accurately and effectively will improve    Intervention: [place] patient and significant other/support system to call light to alert staff of needs  New admit. Pt and family oriented to unit.

## 2018-01-24 NOTE — CARE PLAN
Problem: Bowel/Gastric:  Goal: Normal bowel function is maintained or improved    Intervention: Educate patient and significant other/support system about diet, fluid intake, medications and activity to promote bowel function  Pt verbalizes understanding of education on bowel regime. Reinforcement needed.

## 2018-01-24 NOTE — DISCHARGE PLANNING
Medical SW    Referral: Sw attended AM IDT Rounds.    Intervention: Per face sheet, pt admitted on 1/23/18 for respiratory failure, resident Devan, , 53yo, male, carries Medicare and Medicaid FFS.    Sw d/juliana pt recently to [facility] [address].    RN reports, pt is A/O, didn't mobilize last night     Plan: Sw to assist w/ d/c planning as needed.

## 2018-01-24 NOTE — PROGRESS NOTES
[facility] Hospitalist Progress Note    Date of Service: 2018    Chief Complaint  52 y.o. male admitted 2018 with recurrent left pleural effusion.    Interval Problem Update  [facility], required intubation and interpreted up with a tracheostomy.  Patient was transferred to [facility], comes back from there due to recurrent pleural effusion.  Surgical recommendations pending.  Patient seen and examined in the ICU, ICU care given.  Discussed patient condition and plan with Intensivist, RN, RT and charge nurse / hot rounds.    No overnight events  Following  Flutter  tmax 37.2  Possible surgery today  BM overnight  Good uop    Consultants/Specialty  Intensivist  Surgery    Disposition  Patient requires additional treatment in the hospital, will need to go back to [facility] when cleared        Review of Systems   Unable to perform ROS: Intubated      Physical Exam  Laboratory/Imaging   Hemodynamics  Temp (24hrs), Av.9 °C (98.5 °F), Min:36.7 °C (98 °F), Max:37.2 °C (99 °F)   Temperature: 37.2 °C (99 °F)  Pulse  Av.3  Min: 58  Max: 88 Heart Rate (Monitored): (!) 59  Blood Pressure: 115/73, NIBP: 128/77      Respiratory  Leo Vent Mode: APVCMV, Rate (breaths/min): 20, PEEP/CPAP: 10, PEEP/CPAP: 10, FiO2: 30, P Peak (PIP): 31, P MEAN: 15   Respiration: 16, Pulse Oximetry: 95 %     Work Of Breathing / Effort: Vented  RUL Breath Sounds: Coarse Crackles, RML Breath Sounds: Coarse Crackles, RLL Breath Sounds: Diminished, EGNNARO Breath Sounds: Diminished, LLL Breath Sounds: Diminished    Fluids    Intake/Output Summary (Last 24 hours) at 18 0809  Last data filed at 18 0600   Gross per 24 hour   Intake                0 ml   Output              600 ml   Net             -600 ml       Nutrition  Orders Placed This Encounter   Procedures   • Diet NPO     Standing Status:   Standing     Number of Occurrences:   1     Order Specific Question:   Type:     Answer:   Now  [1]     Order Specific Question:   Restrict to:     Answer:   Strict [1]     Physical Exam   Constitutional:  Non-toxic appearance. He appears ill. No distress.   HENT:   Head: Normocephalic and atraumatic.   Mouth/Throat: No oropharyngeal exudate.   Eyes: Right eye exhibits no discharge. Left eye exhibits no discharge.   Neck: Neck supple. No tracheal deviation, no edema and no erythema present.   Cardiovascular: Normal rate and regular rhythm.  Exam reveals no gallop and no friction rub.    Murmur heard.  Pulmonary/Chest: No stridor. He has decreased breath sounds. He has no wheezes. He has rales.   Abdominal: Soft. Bowel sounds are normal. He exhibits no distension.   Musculoskeletal: He exhibits edema.   Lymphadenopathy:     He has no cervical adenopathy.   Neurological:   Trach, on vent, nonverbal    Skin: Skin is warm and dry. No rash noted. He is not diaphoretic. No erythema.   Psychiatric: He is noncommunicative.   Nursing note and vitals reviewed.      Recent Labs      01/24/18   0505   WBC  16.3*   RBC  3.33*   HEMOGLOBIN  7.4*   HEMATOCRIT  28.0*   MCV  84.1   MCH  22.2*   MCHC  26.4*   RDW  65.1*   PLATELETCT  366   MPV  10.7     Recent Labs      01/24/18   0505   SODIUM  148*   POTASSIUM  4.1   CHLORIDE  109                      Assessment/Plan     Pleural effusion on left   Assessment & Plan    - Recurrent, will likely need surgical intervention to improve this  - Surgical recommendations pending        Hypernatremia   Assessment & Plan    - Repeat BMP in the morning, may need free water flushes once were able to use the core track        Normocytic anemia   Assessment & Plan    - No sign of gross bleeding  - Repeat CBC in the morning          Leukocytosis   Assessment & Plan    - Possibly reactive, repeat CBC in the morning  - Await cultures from surgery, otherwise cultures are pending  - If leukocytosis continues to worsen or patient becomes febrile will start antibiotics        Acute respiratory  failure with hypoxia (CMS-HCC)   Assessment & Plan    - Acute on chronic at this point, has been on a ventilator for quite some time, now status post trach  - Currently on full vent support, vent management per intensivist        Paroxysmal atrial fibrillation (CMS-HCC)   Assessment & Plan    - Currently sinus on amiodarone and digoxin  - Avoid anticoagulation at this time        Chronic systolic CHF (congestive heart failure), NYHA class 4 (CMS-HCC)- (present on admission)   Assessment & Plan    - Does have some edema, no need for Lasix at this time, last time Lasix was used he had worsening of his kidney function  - Most recent ejection fraction was 27%  - Medical management is able        Type 2 diabetes mellitus (CMS-HCC)- (present on admission)   Assessment & Plan    - Mildly elevated, no need for coverage at this time  - Patient is currently nothing by mouth for possible procedure, once tube feeds are restarted may need starting of insulin sliding scale            Reviewed items::  Labs reviewed, Medications reviewed and Radiology images reviewed  DVT prophylaxis - mechanical:  SCDs  Ulcer Prophylaxis::  Yes

## 2018-01-24 NOTE — PROGRESS NOTES
Pulmonary Critical Care Progress Note      Date of service:  1/24/2018    Chief Complaint:  Respiratory failure    Interval Events:  24 hour interval history reviewed  Reason for visit:  Respiratory failure, pleural effusion  Unable to provide ROS due to medical condition       - CXR with large right pleural effusion   - atrial flutter   - TF      Review of Systems   Unable to perform ROS: Medical condition       Physical Exam   Constitutional:   On vent   HENT:   Head: Normocephalic and atraumatic.   Right Ear: External ear normal.   Left Ear: External ear normal.   Nose: Nose normal.   Mouth/Throat: Oropharynx is clear and moist. No oropharyngeal exudate.   Eyes: Conjunctivae are normal. Pupils are equal, round, and reactive to light. Right eye exhibits no discharge. Left eye exhibits no discharge. No scleral icterus.   Neck: Neck supple. No JVD present. No tracheal deviation present.   Trach in place   Cardiovascular: Exam reveals no gallop and no friction rub.    No murmur heard.  Atrial flutter   Pulmonary/Chest: No stridor. He has no wheezes. He has rales (crackles bilaterally).   Decreased breath sounds on the left   Abdominal: Soft. Bowel sounds are normal. He exhibits no distension. There is no tenderness. There is no rebound and no guarding.   cortrak in place - tolerating enteral TF   Musculoskeletal: He exhibits no tenderness or deformity.   No clubbing or cyanosis   Neurological:   Awake, alert, nods, follows, moves all 4   Skin: Skin is warm and dry. No rash noted. He is not diaphoretic. No erythema. No pallor.       PFSH:  No change.    Respiratory:  Leo Vent Mode: APVCMV, Rate (breaths/min): 20, Vt Target (mL): 540, PEEP/CPAP: 10, FiO2: 30, Static Compliance (ml / cm H2O): 58, Control VTE (exp VT): 635  Pulse Oximetry: 95 %  Vent waveforms and airway mechanics reviewed in detail.  CXR personally reviewed and compared to prior images  CXR with large left pleural effusion    HemoDynamics:  Pulse:  (!) 58, Heart Rate (Monitored): (!) 59  Blood Pressure: 115/73, NIBP: 128/77       Neuro:    Fluids:  Intake/Output       18 07 - 18 0659 (Not Admitted) 18 07 - 18 0659 18 07 - 18 0659      8306-2499 5455-0709 Total 3280-4644 3838-8161 Total 5701-8512 5961-2748 Total       Intake    Total Intake -- -- -- -- -- -- -- -- --       Output    Urine  --  -- --  --  600 600  --  -- --    Indwelling Cathether -- -- -- -- 600 600 -- -- --    Stool  --  -- --  --  -- --  --  -- --    Number of Times Stooled -- -- -- -- 11 x 11 x -- -- --    Total Output -- -- -- -- 600 600 -- -- --       Net I/O     -- -- -- -- -600 -600 -- -- --        Weight: (!) 136.7 kg (301 lb 5.9 oz)  Recent Labs      18   0505   SODIUM  148*   POTASSIUM  4.1   CHLORIDE  109       GI/Nutrition:    Liver Function  No results for input(s): ALTSGPT, ASTSGOT, ALKPHOSPHAT, TBILIRUBIN, DBILIRUBIN, GAMMAGT, AMYLASE, LIPASE, ALB, PREALBUMIN, GLUCOSE in the last 72 hours.    Heme:  Recent Labs      18   0505   RBC  3.33*   HEMOGLOBIN  7.4*   HEMATOCRIT  28.0*   PLATELETCT  366       Infectious Disease:  Temp  Av.9 °C (98.5 °F)  Min: 36.7 °C (98 °F)  Max: 37.2 °C (99 °F)    Recent Labs      18   0505   WBC  16.3*   NEUTSPOLYS  73.70*   LYMPHOCYTES  12.30*   MONOCYTES  3.50   EOSINOPHILS  2.60   BASOPHILS  0.00     Current Facility-Administered Medications   Medication Dose Frequency Provider Last Rate Last Dose   • acetaminophen (TYLENOL) tablet 650 mg  650 mg Q6HRS PRN Linnette Espinoza M.D.       • ondansetron (ZOFRAN) syringe/vial injection 4 mg  4 mg Q4HRS PRN Linnette Espinoza M.D.       • ondansetron (ZOFRAN ODT) dispertab 4 mg  4 mg Q4HRS PRN Linnette Espinoza M.D.       • promethazine (PHENERGAN) tablet 12.5-25 mg  12.5-25 mg Q4HRS PRROSANA Espinoza M.D.       • promethazine (PHENERGAN) suppository 12.5-25 mg  12.5-25 mg Q4HRS PRN Linnette Espinoza M.D.       • prochlorperazine (COMPAZINE)  injection 5-10 mg  5-10 mg Q4HRS PRN Linnette Espinoza M.D.       • amiodarone (CORDARONE) tablet 200 mg  200 mg Q DAY Linnette Espinoza M.D.       • aspirin (ASA) chewable tab 81 mg  81 mg DAILY Linnette Espinoza M.D.       • atorvastatin (LIPITOR) tablet 40 mg  40 mg QHS Linnette Espinoza M.D.   40 mg at 01/23/18 2025   • digoxin (LANOXIN) tablet 125 mcg  125 mcg DAILY AT 1800 Linnette Espinoza M.D.   125 mcg at 01/23/18 2025   • miconazole 2%-zinc oxide (MILTON) topical cream   Q6HRS PRN Linnette Espinoza M.D.       • nystatin (MYCOSTATIN) [id number] UNIT/ML suspension 500,000 Units  5 mL 4X/DAY Linnette Espinoza M.D.   500,000 Units at 01/23/18 2024   • polyethylene glycol/lytes (MIRALAX) PACKET 1 Packet  1 Packet DAILY Linnette Espinoza M.D.       • quetiapine (SEROQUEL) tablet 50 mg  50 mg Q EVENING Linnette Espinoza M.D.   50 mg at 01/23/18 2025   • temazepam (RESTORIL) capsule 15 mg  15 mg QHS PRN Linnette Espinoza M.D.       • spironolactone (ALDACTONE) tablet 50 mg  50 mg Q DAY Linnette Espinoza M.D.       • lisinopril (PRINIVIL) 10 MG tablet 10 mg  10 mg Q DAY Linnette Espinoza M.D.       • alprazolam (XANAX) tablet 0.5 mg  0.5 mg Q6HRS PRN Linnette Espinoza M.D.       • haloperidol lactate (HALDOL) injection 2-5 mg  2-5 mg Q4HRS PRN Linnette Espinoza M.D.       • morphine (pf) 4 mg/ml injection 2 mg  2 mg Q4HRS PRN Linnette Espinoza M.D.       • nitroglycerin (NITROSTAT) tablet 0.4 mg  0.4 mg Q5 MIN PRN Linnette Espinoza M.D.       • oxycodone immediate-release (ROXICODONE) tablet 5 mg  5 mg Q4HRS PRN Linnette Espinoza M.D.       • Divalproex Sodium (DEPAKOTE) capsule 250 mg  250 mg Q8HRS Linnette Espinoza M.D.   250 mg at 01/24/18 0559   • Respiratory Care per Protocol   Continuous RT Chandu Justice M.D.       • senna-docusate (PERICOLACE or SENOKOT S) 8.6-50 MG per tablet 2 Tab  2 Tab BID Chandu Justice M.D.   2 Tab at 01/23/18 2025    And   • polyethylene glycol/lytes (MIRALAX) PACKET 1 Packet  1 Packet QDAY PRN Chandu Coyle  LISSA Gutierrez        And   • magnesium hydroxide (MILK OF MAGNESIA) suspension 30 mL  30 mL QDAY PRN Chandu Justice M.D.        And   • bisacodyl (DULCOLAX) suppository 10 mg  10 mg QDAY PRN Chandu Justice M.D.       • chlorhexidine (PERIDEX) 0.12 % solution 15 mL  15 mL BID Chandu Justice M.D.   15 mL at 01/23/18 2024   • lidocaine (XYLOCAINE) 1%  injection  1-2 mL Q30 MIN PRN Chandu Justice M.D.       • ipratropium-albuterol (DUONEB) nebulizer solution 3 mL  3 mL Q2HRS PRN (RT) Chandu Justice M.D.       • ipratropium-albuterol (DUONEB) nebulizer solution 3 mL  3 mL Q4HRS (RT) Chandu Justice M.D.   3 mL at 01/24/18 0710   • famotidine (PEPCID) tablet 20 mg  20 mg Q12HRS Chandu Justice M.D.   20 mg at 01/23/18 2026    Or   • famotidine (PEPCID) injection 20 mg  20 mg Q12HRS Chandu Justice M.D.         Last reviewed on 12/22/2017  7:07 AM by Katie Aly R.N.    Quality  Measures:  Labs reviewed, Medications reviewed and Radiology images reviewed  Ceballos catheter: Critically Ill - Requiring Accurate Measurement of Urinary Output        DVT prophylaxis - mechanical: SCDs  Ulcer prophylaxis: Yes  Antibiotics: Treating active infection/contamination beyond 24 hours perioperative coverage          Assessment and Plan:    Acute hypoxemic respiratory failure    VDRF   - cont vent support   - no SBT    Trach - 12/30    Recurrent left pleural effusion   - VATS tomorrow    COPD - no acute exacerbation   - cont BDs    S/P 2V CABG - 12/22   - cont ASA, statin    DM type 2   - follow glucose    Paroxysmal atrial fibrillation/atrial flutter   - rate control   - hold anticoagulation in anticipation of thoracic surgery   - cont amiodarone and digoxin    Acute systolic heart failure   - EF 25-30%   - cont digoxin and lisinopril   - cont aldactone    S/P NSTEMI in December 2017   - cont ASA, statin    Leukocytosis   - observe off antibiotics for now   - BC negative  from 1/23   - follow up sputum culture      I assessed and reassessed his respiratory status with vent adjustments, airway mechanics, ventilator waveforms, hemodynamics, BP, cardiovascular status.  He is at increased risk for worsening respiratory system dysfunction.    High risk of deterioration and worsening vital organ dysfunction and death without the above critical care interventions.    Critical Care Time:  33 minutes  [id number]  No time overlap  Time excludes procedures  Discussed with RN, RT, Team, Clinical pharmacist, Hospitalist

## 2018-01-24 NOTE — DIETARY
"Nutrition Support Assessment    Joshua Medrano is a 52 y.o. male with admitting DX of Respiratory Failure, Pleural effusion on left    Pertinent History: Pt admitted 12/19 for STEMI, had CABG 12/22, and required a trach 12/30; pt was [facility]'d [facility] 1[facility]. Pt now here with L PE; plan contact Dr. Blanco for thorascopic pleurodesis. Pt has been on TF @ [facility]. H/o DM, COPD.    Height: 195.6 cm (6' 5\")  Weight: (!) 136.7 kg (301 lb 5.9 oz)  Body mass index is 35.74 kg/m².   IBW: 208 lb (94.5 kg)    Pertinent Labs: Na 148, glu 104, BUN 47  Pertinent Medications: Bowel meds, aldactone  GI: BM today     Estimated Needs: REE per [facility] = 2327 kcal/day; RMR per [facility] (vent L/min 11, T max/24 hours 37.2) = 2573 kcal/day (65-70% = 5962-5110 kcal/day)  Calories/day: 1504 - 1914 (11 - 14 kcal/kg)  Grams protein/day: 189+ (2.0+ grams/kg IBW) (1.0-1.2 grams/kg = 137-164 grams/day)        Assessment / Evaluation:  Duodenal Cortrak in place with nasal bridle. Unclear if TF will start today, pending surgery.  Estimated needs based on critical care, obesity guidelines (BMI >30).     Plan / Recommendation:  Peptamen Intense, goal rate 75 ml/hr to provide 1800 kcal, 168 grams protein, and 1512 ml free water per day.  ?Free water flushes.    RD following.     "

## 2018-01-25 ENCOUNTER — APPOINTMENT (OUTPATIENT)
Dept: RADIOLOGY | Facility: MEDICAL CENTER | Age: 53
DRG: 163 | End: 2018-01-25
Attending: SURGERY
Payer: MEDICARE

## 2018-01-25 ENCOUNTER — APPOINTMENT (OUTPATIENT)
Dept: RADIOLOGY | Facility: MEDICAL CENTER | Age: 53
DRG: 163 | End: 2018-01-25
Attending: INTERNAL MEDICINE
Payer: MEDICARE

## 2018-01-25 LAB
ABO GROUP BLD: NORMAL
ANION GAP SERPL CALC-SCNC: 9 MMOL/L (ref 0–11.9)
APTT PPP: 33 SEC (ref 24.7–36)
BASOPHILS # BLD AUTO: 0.7 % (ref 0–1.8)
BASOPHILS # BLD: 0.11 K/UL (ref 0–0.12)
BLD GP AB SCN SERPL QL: NORMAL
BUN SERPL-MCNC: 51 MG/DL (ref 8–22)
CALCIUM SERPL-MCNC: 9 MG/DL (ref 8.4–10.2)
CHLORIDE SERPL-SCNC: 107 MMOL/L (ref 96–112)
CO2 SERPL-SCNC: 29 MMOL/L (ref 20–33)
CREAT SERPL-MCNC: 1.41 MG/DL (ref 0.5–1.4)
EOSINOPHIL # BLD AUTO: 0.36 K/UL (ref 0–0.51)
EOSINOPHIL NFR BLD: 2.4 % (ref 0–6.9)
ERYTHROCYTE [DISTWIDTH] IN BLOOD BY AUTOMATED COUNT: 63.4 FL (ref 35.9–50)
GLUCOSE BLD-MCNC: 114 MG/DL (ref 65–99)
GLUCOSE SERPL-MCNC: 92 MG/DL (ref 65–99)
HCT VFR BLD AUTO: 30.2 % (ref 42–52)
HGB BLD-MCNC: 8 G/DL (ref 14–18)
IMM GRANULOCYTES # BLD AUTO: 0.4 K/UL (ref 0–0.11)
IMM GRANULOCYTES NFR BLD AUTO: 2.6 % (ref 0–0.9)
INR PPP: 1.48 (ref 0.87–1.13)
LYMPHOCYTES # BLD AUTO: 1.07 K/UL (ref 1–4.8)
LYMPHOCYTES NFR BLD: 7.1 % (ref 22–41)
MAGNESIUM SERPL-MCNC: 2.3 MG/DL (ref 1.5–2.5)
MCH RBC QN AUTO: 22 PG (ref 27–33)
MCHC RBC AUTO-ENTMCNC: 26.5 G/DL (ref 33.7–35.3)
MCV RBC AUTO: 83 FL (ref 81.4–97.8)
MONOCYTES # BLD AUTO: 1.19 K/UL (ref 0–0.85)
MONOCYTES NFR BLD AUTO: 7.9 % (ref 0–13.4)
NEUTROPHILS # BLD AUTO: 11.97 K/UL (ref 1.82–7.42)
NEUTROPHILS NFR BLD: 79.3 % (ref 44–72)
NRBC # BLD AUTO: 0.04 K/UL
NRBC BLD-RTO: 0.3 /100 WBC
PHOSPHATE SERPL-MCNC: 3.7 MG/DL (ref 2.5–4.5)
PLATELET # BLD AUTO: 370 K/UL (ref 164–446)
PMV BLD AUTO: 10.4 FL (ref 9–12.9)
POTASSIUM SERPL-SCNC: 3.9 MMOL/L (ref 3.6–5.5)
PROTHROMBIN TIME: 17.6 SEC (ref 12–14.6)
RBC # BLD AUTO: 3.64 M/UL (ref 4.7–6.1)
RH BLD: NORMAL
SODIUM SERPL-SCNC: 145 MMOL/L (ref 135–145)
WBC # BLD AUTO: 15.1 K/UL (ref 4.8–10.8)

## 2018-01-25 PROCEDURE — 99233 SBSQ HOSP IP/OBS HIGH 50: CPT | Performed by: INTERNAL MEDICINE

## 2018-01-25 PROCEDURE — 86901 BLOOD TYPING SEROLOGIC RH(D): CPT

## 2018-01-25 PROCEDURE — 71045 X-RAY EXAM CHEST 1 VIEW: CPT

## 2018-01-25 PROCEDURE — 85730 THROMBOPLASTIN TIME PARTIAL: CPT

## 2018-01-25 PROCEDURE — 86850 RBC ANTIBODY SCREEN: CPT

## 2018-01-25 PROCEDURE — 500122 HCHG BOVIE, BLADE: Performed by: SURGERY

## 2018-01-25 PROCEDURE — 700111 HCHG RX REV CODE 636 W/ 250 OVERRIDE (IP): Performed by: INTERNAL MEDICINE

## 2018-01-25 PROCEDURE — 94003 VENT MGMT INPAT SUBQ DAY: CPT

## 2018-01-25 PROCEDURE — 87205 SMEAR GRAM STAIN: CPT

## 2018-01-25 PROCEDURE — 0W9B40Z DRAINAGE OF LEFT PLEURAL CAVITY WITH DRAINAGE DEVICE, PERCUTANEOUS ENDOSCOPIC APPROACH: ICD-10-PCS | Performed by: SURGERY

## 2018-01-25 PROCEDURE — 82962 GLUCOSE BLOOD TEST: CPT

## 2018-01-25 PROCEDURE — A9270 NON-COVERED ITEM OR SERVICE: HCPCS | Performed by: INTERNAL MEDICINE

## 2018-01-25 PROCEDURE — A6402 STERILE GAUZE <= 16 SQ IN: HCPCS | Performed by: SURGERY

## 2018-01-25 PROCEDURE — 501838 HCHG SUTURE GENERAL: Performed by: SURGERY

## 2018-01-25 PROCEDURE — 700101 HCHG RX REV CODE 250

## 2018-01-25 PROCEDURE — 500800 HCHG LAPAROSCOPIC J/L HOOK: Performed by: SURGERY

## 2018-01-25 PROCEDURE — 87075 CULTR BACTERIA EXCEPT BLOOD: CPT

## 2018-01-25 PROCEDURE — 700102 HCHG RX REV CODE 250 W/ 637 OVERRIDE(OP): Performed by: INTERNAL MEDICINE

## 2018-01-25 PROCEDURE — 160048 HCHG OR STATISTICAL LEVEL 1-5: Performed by: SURGERY

## 2018-01-25 PROCEDURE — 85610 PROTHROMBIN TIME: CPT

## 2018-01-25 PROCEDURE — 501589 HCHG TUBE, CHEST 28FR: Performed by: SURGERY

## 2018-01-25 PROCEDURE — 87015 SPECIMEN INFECT AGNT CONCNTJ: CPT

## 2018-01-25 PROCEDURE — 94640 AIRWAY INHALATION TREATMENT: CPT

## 2018-01-25 PROCEDURE — 0B5P4ZZ DESTRUCTION OF LEFT PLEURA, PERCUTANEOUS ENDOSCOPIC APPROACH: ICD-10-PCS | Performed by: SURGERY

## 2018-01-25 PROCEDURE — 500512 HCHG ENDO PEANUT: Performed by: SURGERY

## 2018-01-25 PROCEDURE — 83735 ASSAY OF MAGNESIUM: CPT

## 2018-01-25 PROCEDURE — 86900 BLOOD TYPING SEROLOGIC ABO: CPT

## 2018-01-25 PROCEDURE — 160009 HCHG ANES TIME/MIN: Performed by: SURGERY

## 2018-01-25 PROCEDURE — 0BNL4ZZ RELEASE LEFT LUNG, PERCUTANEOUS ENDOSCOPIC APPROACH: ICD-10-PCS | Performed by: SURGERY

## 2018-01-25 PROCEDURE — 87102 FUNGUS ISOLATION CULTURE: CPT

## 2018-01-25 PROCEDURE — 85025 COMPLETE CBC W/AUTO DIFF WBC: CPT

## 2018-01-25 PROCEDURE — 160029 HCHG SURGERY MINUTES - 1ST 30 MINS LEVEL 4: Performed by: SURGERY

## 2018-01-25 PROCEDURE — 500385 HCHG DRAIN, PLEUROVAC ADUL: Performed by: SURGERY

## 2018-01-25 PROCEDURE — 700111 HCHG RX REV CODE 636 W/ 250 OVERRIDE (IP)

## 2018-01-25 PROCEDURE — 84100 ASSAY OF PHOSPHORUS: CPT

## 2018-01-25 PROCEDURE — 501445 HCHG STAPLER, SKIN DISP: Performed by: SURGERY

## 2018-01-25 PROCEDURE — 3E0L4GC INTRODUCTION OF OTHER THERAPEUTIC SUBSTANCE INTO PLEURAL CAVITY, PERCUTANEOUS ENDOSCOPIC APPROACH: ICD-10-PCS | Performed by: SURGERY

## 2018-01-25 PROCEDURE — 700101 HCHG RX REV CODE 250: Performed by: INTERNAL MEDICINE

## 2018-01-25 PROCEDURE — 501570 HCHG TROCAR, SEPARATOR: Performed by: SURGERY

## 2018-01-25 PROCEDURE — 160041 HCHG SURGERY MINUTES - EA ADDL 1 MIN LEVEL 4: Performed by: SURGERY

## 2018-01-25 PROCEDURE — 500002 HCHG ADHESIVE, DERMABOND: Performed by: SURGERY

## 2018-01-25 PROCEDURE — 770022 HCHG ROOM/CARE - ICU (200)

## 2018-01-25 PROCEDURE — 88305 TISSUE EXAM BY PATHOLOGIST: CPT

## 2018-01-25 PROCEDURE — 501581 HCHG TROCAR: Performed by: SURGERY

## 2018-01-25 PROCEDURE — 501577 HCHG TROCAR, STEP 11MM: Performed by: SURGERY

## 2018-01-25 PROCEDURE — 87070 CULTURE OTHR SPECIMN AEROBIC: CPT | Mod: 91

## 2018-01-25 PROCEDURE — 80048 BASIC METABOLIC PNL TOTAL CA: CPT

## 2018-01-25 RX ORDER — BUPIVACAINE HYDROCHLORIDE AND EPINEPHRINE 5; 5 MG/ML; UG/ML
INJECTION, SOLUTION EPIDURAL; INTRACAUDAL; PERINEURAL
Status: DISCONTINUED | OUTPATIENT
Start: 2018-01-25 | End: 2018-01-25 | Stop reason: HOSPADM

## 2018-01-25 RX ADMIN — DIVALPROEX SODIUM 250 MG: 125 CAPSULE, COATED PELLETS ORAL at 06:06

## 2018-01-25 RX ADMIN — TEMAZEPAM 15 MG: 15 CAPSULE ORAL at 21:31

## 2018-01-25 RX ADMIN — DIVALPROEX SODIUM 250 MG: 125 CAPSULE, COATED PELLETS ORAL at 21:32

## 2018-01-25 RX ADMIN — MORPHINE SULFATE 2 MG: 4 INJECTION INTRAVENOUS at 23:23

## 2018-01-25 RX ADMIN — CHLORHEXIDINE GLUCONATE 15 ML: 1.2 RINSE ORAL at 21:31

## 2018-01-25 RX ADMIN — IPRATROPIUM BROMIDE AND ALBUTEROL SULFATE 3 ML: .5; 3 SOLUTION RESPIRATORY (INHALATION) at 02:22

## 2018-01-25 RX ADMIN — QUETIAPINE FUMARATE 50 MG: 25 TABLET ORAL at 21:32

## 2018-01-25 RX ADMIN — ATORVASTATIN CALCIUM 40 MG: 40 TABLET, FILM COATED ORAL at 21:32

## 2018-01-25 RX ADMIN — NYSTATIN 500000 UNITS: 100000 SUSPENSION ORAL at 08:32

## 2018-01-25 RX ADMIN — IPRATROPIUM BROMIDE AND ALBUTEROL SULFATE 3 ML: .5; 3 SOLUTION RESPIRATORY (INHALATION) at 22:09

## 2018-01-25 RX ADMIN — FAMOTIDINE 20 MG: 20 TABLET, FILM COATED ORAL at 21:32

## 2018-01-25 RX ADMIN — STANDARDIZED SENNA CONCENTRATE AND DOCUSATE SODIUM 2 TABLET: 8.6; 5 TABLET, FILM COATED ORAL at 21:32

## 2018-01-25 RX ADMIN — IPRATROPIUM BROMIDE AND ALBUTEROL SULFATE 3 ML: .5; 3 SOLUTION RESPIRATORY (INHALATION) at 06:56

## 2018-01-25 RX ADMIN — DIVALPROEX SODIUM 250 MG: 125 CAPSULE, COATED PELLETS ORAL at 15:29

## 2018-01-25 RX ADMIN — IPRATROPIUM BROMIDE AND ALBUTEROL SULFATE 3 ML: .5; 3 SOLUTION RESPIRATORY (INHALATION) at 18:17

## 2018-01-25 RX ADMIN — NYSTATIN 500000 UNITS: 100000 SUSPENSION ORAL at 21:32

## 2018-01-25 RX ADMIN — OXYCODONE HYDROCHLORIDE 5 MG: 5 TABLET ORAL at 15:29

## 2018-01-25 RX ADMIN — FAMOTIDINE 20 MG: 10 INJECTION, SOLUTION INTRAVENOUS at 08:32

## 2018-01-25 RX ADMIN — DIGOXIN 125 MCG: 125 TABLET ORAL at 17:01

## 2018-01-25 RX ADMIN — CHLORHEXIDINE GLUCONATE 15 ML: 1.2 RINSE ORAL at 08:32

## 2018-01-25 RX ADMIN — IPRATROPIUM BROMIDE AND ALBUTEROL SULFATE 3 ML: .5; 3 SOLUTION RESPIRATORY (INHALATION) at 14:20

## 2018-01-25 RX ADMIN — NYSTATIN 500000 UNITS: 100000 SUSPENSION ORAL at 17:01

## 2018-01-25 ASSESSMENT — LIFESTYLE VARIABLES
REASON UNABLE TO ASSESS: TRACHED
REASON UNABLE TO ASSESS: TRACH

## 2018-01-25 ASSESSMENT — PAIN SCALES - GENERAL
PAINLEVEL_OUTOF10: 0
PAINLEVEL_OUTOF10: 6
PAINLEVEL_OUTOF10: 0
PAINLEVEL_OUTOF10: 5

## 2018-01-25 NOTE — CARE PLAN
Problem: Safety  Goal: Will remain free from injury  Outcome: PROGRESSING AS EXPECTED  Assessed strength and mobility.  Staff present for mobilization.      Problem: Infection  Goal: Will remain free from infection  Outcome: PROGRESSING AS EXPECTED  Monitored labs and vital signs.  Assessed for signs of infection.

## 2018-01-25 NOTE — HEART FAILURE PROGRAM
"Cardiovascular Nurse Navigator () Progress Note:     Per Dr. Hernandez on 1/24 this patient is not currently in acute heart failure. Therefore, a seven day HF f/u appt is not currently indicated.    Should clinical status change to acute HF, patient will require a seven calendar day f/u appt which can be achieved by placing a \"schedule heart failure follow up appointment\" order per protocol or by calling the hospital schedulers at 1100.     Thank you and please call with questions or concerns.    Please note, per Dr. Navarro on 1/31, patient is not currently in acute HF.  Therefore, a 7 day HF follow up appointment prior to discharge is not indicated at this time.   Please call [facility] Nurse Navigator with any questions: [phone]. Thank you.  "

## 2018-01-25 NOTE — ADDENDUM NOTE
Encounter addended by: Karen Hanna, RRT on: 1/25/2018  9:21 AM<BR>    Actions taken: Flowsheet accepted

## 2018-01-25 NOTE — CARE PLAN
Problem: Ventilation Defect:  Goal: Ability to achieve and maintain unassisted ventilation or tolerate decreased levels of ventilator support  Outcome: PROGRESSING SLOWER THAN EXPECTED  Vent day 2, Chronic vent, patient tolerated speaking valve while on vent for .5 hours.

## 2018-01-25 NOTE — PROGRESS NOTES
Pulmonary Critical Care Progress Note      Date of service:  1/25/2018    Chief Complaint:  Respiratory failure    Interval Events:  24 hour interval history reviewed  Reason for visit:  Respiratory failure, pleural effusion  Unable to provide ROS due to medical condition       - OR today   - SR   - TF off for surgery   - CXR with large left effusion      Review of Systems   Unable to perform ROS: Medical condition       Physical Exam   Constitutional:   On vent   HENT:   Head: Normocephalic and atraumatic.   Right Ear: External ear normal.   Left Ear: External ear normal.   Nose: Nose normal.   Mouth/Throat: Oropharynx is clear and moist. No oropharyngeal exudate.   Eyes: Conjunctivae are normal. Pupils are equal, round, and reactive to light. Right eye exhibits no discharge. Left eye exhibits no discharge. No scleral icterus.   Neck: Neck supple. No JVD present. No tracheal deviation present.   Trach in place   Cardiovascular: Exam reveals no gallop and no friction rub.    No murmur heard.  Atrial flutter   Pulmonary/Chest: No stridor. He has no wheezes. He has rales (scattered crackles).   Decreased breath sounds on the left   Abdominal: Soft. Bowel sounds are normal. He exhibits no distension. There is no tenderness. There is no rebound and no guarding.   cortrak in place - tolerating enteral TF   Musculoskeletal: He exhibits no tenderness or deformity.   No clubbing or cyanosis   Neurological:   Awake, alert, nods, follows, moves all 4   Skin: Skin is warm and dry. No rash noted. He is not diaphoretic. No erythema. No pallor.       PFSH:  No change.    Respiratory:  Leo Vent Mode: APVCMV, Rate (breaths/min): 20, Vt Target (mL): 540, PEEP/CPAP: 10, FiO2: 30, Static Compliance (ml / cm H2O): 55, Control VTE (exp VT): 521  Pulse Oximetry: 100 %  Vent waveforms and airway mechanics reviewed in detail.  CXR personally reviewed and compared to prior images  CXR with large left pleural effusion -  unchanged    HemoDynamics:  Pulse: (!) 58, Heart Rate (Monitored): (!) 58  NIBP: 110/59       Neuro:    Fluids:  Intake/Output       18 07 - 18 0659 18 07 - 18 0659 18 07 - 18 0659      [id number] 6887-3404 Total 4302-4225 2701-2030 Total 1900-0659 Total       Intake    Other  --  -- --  60  60 120  --  -- --    Medications (P.O./ Enteral Liquids) -- -- -- 60 60 120 -- -- --    Enteral  --  -- --  80  180 260  --  -- --    Enteral Volume -- -- -- 50 150 200 -- -- --    Free Water / Tube Flush -- -- -- 30 30 60 -- -- --    Total Intake -- -- -- 140 240 380 -- -- --       Output    Urine  --  600 600  800  625 1425  --  -- --    Indwelling Cathether -- 600 600  -- -- --    Stool  --  -- --  --  -- --  --  -- --    Number of Times Stooled -- 11 x 11 x -- -- -- -- -- --    Total Output -- 600 600  -- -- --       Net I/O     -- -600 -600 -660 -385 -1045 -- -- --          Recent Labs      18   0505   SODIUM  148*   POTASSIUM  4.1   CHLORIDE  109   CO2  27   BUN  47*   CREATININE  1.13   MAGNESIUM  2.4   PHOSPHORUS  3.4   CALCIUM  9.6       GI/Nutrition:    Liver Function  Recent Labs      18   0505   GLUCOSE  104*       Heme:  Recent Labs      18   0505  18   0540   RBC  3.33*  3.64*   HEMOGLOBIN  7.4*  8.0*   HEMATOCRIT  28.0*  30.2*   PLATELETCT  366  370   PROTHROMBTM   --   17.6*   APTT   --   33.0   INR   --   1.48*       Infectious Disease:  Temp  Av °C (98.6 °F)  Min: 36.9 °C (98.4 °F)  Max: 37.1 °C (98.8 °F)    Recent Labs      18   0505  18   0540   WBC  16.3*  15.1*   NEUTSPOLYS  73.70*  79.30*   LYMPHOCYTES  12.30*  7.10*   MONOCYTES  3.50  7.90   EOSINOPHILS  2.60  2.40   BASOPHILS  0.00  0.70     Current Facility-Administered Medications   Medication Dose Frequency Provider Last Rate Last Dose   • acetaminophen (TYLENOL) tablet 650 mg  650 mg Q6HRS PRN Linnette Espinoza M.D.       • ondansetron  (ZOFRAN) syringe/vial injection 4 mg  4 mg Q4HRS PRN Linnette Espinoza M.D.       • ondansetron (ZOFRAN ODT) dispertab 4 mg  4 mg Q4HRS PRN Linnette Espinoza M.D.       • promethazine (PHENERGAN) tablet 12.5-25 mg  12.5-25 mg Q4HRS PRN Linnette Espinoza M.D.       • promethazine (PHENERGAN) suppository 12.5-25 mg  12.5-25 mg Q4HRS PRN Linnette Espinoza M.D.       • prochlorperazine (COMPAZINE) injection 5-10 mg  5-10 mg Q4HRS PRN Linnette Espinoza M.D.       • amiodarone (CORDARONE) tablet 200 mg  200 mg Q DAY Linnette Espinoza M.D.   200 mg at 01/24/18 0803   • aspirin (ASA) chewable tab 81 mg  81 mg DAILY Linnette Espinoza M.D.   Stopped at 01/24/18 0900   • atorvastatin (LIPITOR) tablet 40 mg  40 mg QHS Linnette Espinoza M.D.   40 mg at 01/24/18 2055   • digoxin (LANOXIN) tablet 125 mcg  125 mcg DAILY AT 1800 Linnette Espinoza M.D.   125 mcg at 01/24/18 1734   • miconazole 2%-zinc oxide (MILTON) topical cream   Q6HRS PRN Linnette Espinoza M.D.       • nystatin (MYCOSTATIN) 944851 UNIT/ML suspension 500,000 Units  5 mL 4X/DAY Linnette Espinoza M.D.   500,000 Units at 01/24/18 2055   • polyethylene glycol/lytes (MIRALAX) PACKET 1 Packet  1 Packet DAILY Linnette Espinoza M.D.   1 Packet at 01/24/18 0757   • quetiapine (SEROQUEL) tablet 50 mg  50 mg Q EVENING Linnette Espinoza M.D.   50 mg at 01/24/18 2055   • temazepam (RESTORIL) capsule 15 mg  15 mg QHS PRN Linnette Espinoza M.D.       • spironolactone (ALDACTONE) tablet 50 mg  50 mg Q DAY Linnette Espinoza M.D.   50 mg at 01/24/18 0803   • lisinopril (PRINIVIL) 10 MG tablet 10 mg  10 mg Q DAY Linnette Espinoza M.D.   10 mg at 01/24/18 0803   • alprazolam (XANAX) tablet 0.5 mg  0.5 mg Q6HRS PRN Linnette Espinoza M.D.   0.5 mg at 01/24/18 2055   • haloperidol lactate (HALDOL) injection 2-5 mg  2-5 mg Q4HRS PRN Linnette Espinoza M.D.   5 mg at 01/24/18 2328   • morphine (pf) 4 mg/ml injection 2 mg  2 mg Q4HRS PRN Linnette Espinoza M.D.       • nitroglycerin (NITROSTAT) tablet 0.4 mg  0.4 mg Q5 MIN PRN  Linnette Espinoza M.D.       • oxycodone immediate-release (ROXICODONE) tablet 5 mg  5 mg Q4HRS PRN Linnette Espinoza M.D.       • Divalproex Sodium (DEPAKOTE) capsule 250 mg  250 mg Q8HRS Linnette Espinoza M.D.   250 mg at 01/25/18 0606   • Respiratory Care per Protocol   Continuous RT Chandu Justice M.D.       • senna-docusate (PERICOLACE or SENOKOT S) 8.6-50 MG per tablet 2 Tab  2 Tab BID Chandu Justice M.D.   2 Tab at 01/24/18 2055    And   • polyethylene glycol/lytes (MIRALAX) PACKET 1 Packet  1 Packet QDAY PRN Chandu Justice M.D.        And   • magnesium hydroxide (MILK OF MAGNESIA) suspension 30 mL  30 mL QDAY PRN Chandu Justice M.D.        And   • bisacodyl (DULCOLAX) suppository 10 mg  10 mg QDAY PRN Chandu Justice M.D.       • chlorhexidine (PERIDEX) 0.12 % solution 15 mL  15 mL BID Chandu Justice M.D.   15 mL at 01/24/18 2055   • lidocaine (XYLOCAINE) 1%  injection  1-2 mL Q30 MIN PRN Chandu Justice M.D.       • ipratropium-albuterol (DUONEB) nebulizer solution 3 mL  3 mL Q2HRS PRN (RT) Chandu Justice M.D.       • ipratropium-albuterol (DUONEB) nebulizer solution 3 mL  3 mL Q4HRS (RT) Chandu Justice M.D.   3 mL at 01/25/18 0656   • famotidine (PEPCID) tablet 20 mg  20 mg Q12HRS Chandu Justice M.D.   20 mg at 01/24/18 2055    Or   • famotidine (PEPCID) injection 20 mg  20 mg Q12HRS Chandu Justice M.D.         Last reviewed on 12/22/2017  7:07 AM by Katie L. Bagshaw, R.N.    Quality  Measures:  Labs reviewed, Medications reviewed and Radiology images reviewed  Ceballos catheter: Critically Ill - Requiring Accurate Measurement of Urinary Output        DVT prophylaxis - mechanical: SCDs  Ulcer prophylaxis: Yes  Antibiotics: Treating active infection/contamination beyond 24 hours perioperative coverage          Assessment and Plan:    Acute hypoxemic respiratory failure    VDRF   - cont vent support   - no SBT    Trach -  12/30    Recurrent left pleural effusion   - VATS today    COPD - no acute exacerbation   - cont BDs    S/P 2V CABG - 12/22   - cont ASA, statin    DM type 2   - follow glucose    Paroxysmal atrial fibrillation/atrial flutter   - rate control   - hold anticoagulation in anticipation of thoracic surgery   - cont amiodarone and digoxin    Acute systolic heart failure   - EF 25-30%   - cont digoxin and lisinopril   - cont aldactone    S/P NSTEMI in December 2017   - cont ASA, statin    Leukocytosis   - improved   - BC from 1/23 negative   - observe off antibiotics for now      I assessed and reassessed his respiratory status with vent adjustments, airway mechanics, ventilator waveforms.  He is at increased risk for worsening respiratory system dysfunction.    High risk of deterioration and worsening vital organ dysfunction and death without the above critical care interventions.    Critical Care Time:  32 minutes  48625  No time overlap  Time excludes procedures  Discussed with RN, RT, Team, Clinical pharmacist, Hospitalist

## 2018-01-25 NOTE — ASSESSMENT & PLAN NOTE
- Possibly reactive, stable, repeat CBC in the morning  - Await cultures from surgery, otherwise cultures are pending  - If patient worsens will consider antibiotics

## 2018-01-25 NOTE — ADDENDUM NOTE
Encounter addended by: Karen Hanna, RRT on: 1/25/2018  9:20 AM<BR>    Actions taken: Flowsheet accepted

## 2018-01-25 NOTE — THERAPY
Patient NPO for a procedure on this date.  SLP will hold at this time and attempt blue dye swallow once the patient is appropriate.

## 2018-01-25 NOTE — CARE PLAN
Problem: Ventilation Defect:  Goal: Ability to achieve and maintain unassisted ventilation or tolerate decreased levels of ventilator support  Adult Ventilation Update    Total Vent Days: 3  20/540/10/30% 8.0portex  Patient Lines/Drains/Airways Status    Active Airway     Name: Placement date: Placement time: Site: Days:    Airway Group Standard Cuffed Trach Tracheostomy 8.0 12/31/17   1106   Tracheostomy   24              Plateau Pressure (Q Shift): 32 (01/24/18 1109)  Static Compliance (ml / cm H2O): 62 (01/24/18 1446)    Patient failed trials because of Barriers to Wean: Other (Comments) (pt awake and alert on trach. PEEP of 10) (01/24/18 1853)    Sputum/Suction   Cough: Non Productive (01/24/18 1853)  Sputum Amount: Scant (01/24/18 1853)  Sputum Color: Yellow;White (01/24/18 1853)  Sputum Consistency: Thin (01/24/18 1853)    Mobility Group  Activity Performed: Edge of bed (01/24/18 1600)  Time Activity Tolerated: 5 min (01/24/18 1600)  Assistance / Tolerance: Assistance of Two or More (01/24/18 1600)  Pt Calls for Assistance: Yes (01/24/18 1600)  Staff Present for Mobilization: RN (01/24/18 1600)  Reason Not Mobilized: Bed rest (01/24/18 0000)    Events/Summary/Plan: return to unit (01/24/18 2034)

## 2018-01-25 NOTE — PROGRESS NOTES
[facility] Hospitalist Progress Note    Date of Service: 2018    Chief Complaint  52 y.o. male admitted 2018 with recurrent left pleural effusion.    Interval Problem Update  [facility] required [facility] and interpreted up with a tracheostomy.  Patient was transferred to [facility], comes back from there due to recurrent pleural effusion.  Surgical recommendations pending.  Patient seen and examined in the ICU, ICU care given.  Discussed patient condition and plan with Intensivist, RN, RT and charge nurse / hot rounds.    No overnight events  Following  Sinus  Npo for surgery  Good uop  Ceballos in place  Trach, chronic vent    Consultants/Specialty  Intensivist  Surgery    Disposition  Patient requires additional treatment in the hospital, will need to go back to [facility] when cleared        Review of Systems   Unable to perform ROS: Intubated      Physical Exam  Laboratory/Imaging   Hemodynamics  Temp (24hrs), Av °C (98.6 °F), Min:36.9 °C (98.4 °F), Max:37 °C (98.6 °F)   Temperature: 37 °C (98.6 °F)  Pulse  Av.5  Min: 58  Max: 88 Heart Rate (Monitored): (!) 58  NIBP: 110/59      Respiratory  Leo Vent Mode: APVCMV, Rate (breaths/min): 20, PEEP/CPAP: 10, PEEP/CPAP: 10, FiO2: 30, P Peak (PIP): 25, P MEAN: 15   Respiration: 18, Pulse Oximetry: 100 %     Work Of Breathing / Effort: Vented  RUL Breath Sounds: Diminished, RML Breath Sounds: Diminished, RLL Breath Sounds: Diminished, GENNARO Breath Sounds: Diminished, LLL Breath Sounds: Diminished    Fluids    Intake/Output Summary (Last 24 hours) at 18 0808  Last data filed at 18 0600   Gross per 24 hour   Intake              290 ml   Output             1425 ml   Net            -1135 ml       Nutrition  Orders Placed This Encounter   Procedures   • Diet NPO     Standing Status:   Standing     Number of Occurrences:   1     Order Specific Question:   Type:     Answer:   Now [1]     Order Specific  Question:   Restrict to:     Answer:   Strict [1]     Physical Exam   Constitutional:  Non-toxic appearance. He appears ill.   HENT:   Head: Normocephalic and atraumatic.   Eyes: Right eye exhibits no discharge. Left eye exhibits no discharge. No scleral icterus.   Neck: Neck supple. No edema and no erythema present.   Cardiovascular: Normal rate and regular rhythm.  Exam reveals no friction rub.    Murmur heard.  Pulmonary/Chest: No stridor. He has decreased breath sounds. He has rales.   Abdominal: Soft. He exhibits no distension.   Musculoskeletal: He exhibits edema.   Lymphadenopathy:     He has no cervical adenopathy.   Neurological:   Trach, on vent, nonverbal    Skin: Skin is warm and dry. He is not diaphoretic. No erythema.   Psychiatric: He is noncommunicative.   Nursing note and vitals reviewed.      Recent Labs      01/24/18   0505  01/25/18   0540   WBC  16.3*  15.1*   RBC  3.33*  3.64*   HEMOGLOBIN  7.4*  8.0*   HEMATOCRIT  28.0*  30.2*   MCV  84.1  83.0   MCH  22.2*  22.0*   MCHC  26.4*  26.5*   RDW  65.1*  63.4*   PLATELETCT  366  370   MPV  10.7  10.4     Recent Labs      01/24/18   0505   SODIUM  148*   POTASSIUM  4.1   CHLORIDE  109   CO2  27   GLUCOSE  104*   BUN  47*   CREATININE  1.13   CALCIUM  9.6     Recent Labs      01/25/18   0540   APTT  33.0   INR  1.48*                  Assessment/Plan     Pleural effusion on left   Assessment & Plan    - Recurrent, will likely need surgical intervention to improve this  -Surgery today        Hypernatremia   Assessment & Plan    - Repeat BMP in the morning, may need free water flushes once were able to use the core track        Normocytic anemia   Assessment & Plan    - No sign of gross bleeding  - Repeat CBC in the morning          Leukocytosis   Assessment & Plan    - Possibly reactive, improved, repeat CBC in the morning  - Await cultures from surgery, otherwise cultures are pending  - If leukocytosis continues to worsen or patient becomes febrile  will start antibiotics        Acute respiratory failure with hypoxia (CMS-HCC)   Assessment & Plan    - Acute on chronic at this point, has been on a ventilator for quite some time, now status post trach  - Currently on full vent support, vent management per intensivist        Paroxysmal atrial fibrillation (CMS-HCC)   Assessment & Plan    - Currently sinus on amiodarone and digoxin  - Avoid anticoagulation at this time        Chronic systolic CHF (congestive heart failure), NYHA class 4 (CMS-HCC)- (present on admission)   Assessment & Plan    - Does have some edema, no need for Lasix at this time, last time Lasix was used he had worsening of his kidney function  - Most recent ejection fraction was 27%  - Medical management is able        Type 2 diabetes mellitus (CMS-HCC)- (present on admission)   Assessment & Plan    - Mildly elevated, no need for coverage at this time  - Patient is currently nothing by mouth for possible procedure, once tube feeds are restarted may need starting of insulin sliding scale            Reviewed items::  Labs reviewed, Medications reviewed and Radiology images reviewed  DVT prophylaxis - mechanical:  SCDs  Ulcer Prophylaxis::  Yes

## 2018-01-25 NOTE — CARE PLAN
Problem: Knowledge Deficit  Goal: Knowledge of disease process/condition, treatment plan, diagnostic tests, and medications will improve  Outcome: PROGRESSING SLOWER THAN EXPECTED  Patient and family educated on disease process and treatment plan    Problem: Pain Management  Goal: Pain level will decrease to patient's comfort goal  Pain control adequate

## 2018-01-25 NOTE — CONSULTS
"Surgical Consultation    Date: 1/24/2018    Requesting Physician: Dr. Ash/Carlos Manuel Gutierrez/Francis  PCP: Pcp Pt States None  Consulting Physician: Chandu Paez M.D.    CC: Persistent left pleural effusion    HPI: This is a 52 y.o. male who is presenting from rehab with a large left pleural effusion. He has a history of coronary artery disease status post 2 vessel coronary bypass in late December with postoperative ventilator-dependent respiratory failure requiring tracheostomy.  He has had bilateral pleural effusions since that time requiring bilateral thoracentesis. The right side has resolved, but the left pleural effusion has been persistent. His most recent thoracentesis was 2 days ago at his rehab facility with removal of 400 mL of \"bloody fluid\". He has had a left thoracentesis at this institution on January 3, which was negative for malignant cells. A left chest tube was attempted at the rehab facility, but aborted. He was transferred back to this institution for further management of the effusion.  Reportedly he had a CT of the chest at the rehab facility, but there is no imaging in the system. He is currently awake and alert, denies significant pain but is unable to speak due to his tracheostomy.    Past Medical History:   Diagnosis Date   • ASTHMA    • Chronic obstructive pulmonary disease (CMS-HCC)    • Diabetes        Past Surgical History:   Procedure Laterality Date   • MULTIPLE CORONARY ARTERY BYPASS ENDO VEIN HARVEST  12/22/2017    Procedure: MULTIPLE CORONARY ARTERY BYPASS ENDO VEIN HARVEST X2;  Surgeon: Kiel Ash M.D.;  Location: SURGERY [facility];  Service: Cardiothoracic   • JIM  12/22/2017    Procedure: JIM;  Surgeon: Kiel Ash M.D.;  Location: SURGERY [facility];  Service: Cardiothoracic   • OTHER ABDOMINAL SURGERY         Current Facility-Administered Medications   Medication Dose Route Frequency Provider Last Rate Last Dose   • acetaminophen (TYLENOL) tablet 650 mg  650 " mg Oral Q6HRS PRN Linnette Espinoza M.D.       • ondansetron (ZOFRAN) syringe/vial injection 4 mg  4 mg Intravenous Q4HRS PRN Linnette Espinoza M.D.       • ondansetron (ZOFRAN ODT) dispertab 4 mg  4 mg Oral Q4HRS PRN Linnette Espinoza M.D.       • promethazine (PHENERGAN) tablet 12.5-25 mg  12.5-25 mg Oral Q4HRS PRN Linnette Espinoza M.D.       • promethazine (PHENERGAN) suppository 12.5-25 mg  12.5-25 mg Rectal Q4HRS PRN Linnette Espinoza M.D.       • prochlorperazine (COMPAZINE) injection 5-10 mg  5-10 mg Intravenous Q4HRS PRN Linnette Espinoza M.D.       • amiodarone (CORDARONE) tablet 200 mg  200 mg Oral Q DAY Linnette Espinoza M.D.   200 mg at 01/24/18 0803   • aspirin (ASA) chewable tab 81 mg  81 mg Oral DAILY Linnette Espinoza M.D.   Stopped at 01/24/18 0900   • atorvastatin (LIPITOR) tablet 40 mg  40 mg Oral QHS Linnette Espinoza M.D.   40 mg at 01/23/18 2025   • digoxin (LANOXIN) tablet 125 mcg  125 mcg Oral DAILY AT 1800 Linnette Espinoza M.D.   125 mcg at 01/23/18 2025   • miconazole 2%-zinc oxide (MILTON) topical cream   Topical Q6HRS PRN Linnette Espinoza M.D.       • nystatin (MYCOSTATIN) 634035 UNIT/ML suspension 500,000 Units  5 mL Oral 4X/DAY Linnette Espinoza M.D.   500,000 Units at 01/24/18 0756   • polyethylene glycol/lytes (MIRALAX) PACKET 1 Packet  1 Packet Oral DAILY Linnette Espinoza M.D.   1 Packet at 01/24/18 0757   • quetiapine (SEROQUEL) tablet 50 mg  50 mg Oral Q EVENING Linnette Espinoza M.D.   50 mg at 01/23/18 2025   • temazepam (RESTORIL) capsule 15 mg  15 mg Oral QHS PRN Linnette Espinoza M.D.       • spironolactone (ALDACTONE) tablet 50 mg  50 mg Oral Q DAY Linnette Espinoza M.D.   50 mg at 01/24/18 0803   • lisinopril (PRINIVIL) 10 MG tablet 10 mg  10 mg Oral Q DAY Linnette Espinoza M.D.   10 mg at 01/24/18 0803   • alprazolam (XANAX) tablet 0.5 mg  0.5 mg Oral Q6HRS PRN Linnette Espinoza M.D.       • haloperidol lactate (HALDOL) injection 2-5 mg  2-5 mg Intravenous Q4HRS PRN Linnette Espinoza M.D.       • morphine  (pf) 4 mg/ml injection 2 mg  2 mg Intravenous Q4HRS PRN Linnette Espinoza M.D.       • nitroglycerin (NITROSTAT) tablet 0.4 mg  0.4 mg Sublingual Q5 MIN PRN Linnette Espinoza M.D.       • oxycodone immediate-release (ROXICODONE) tablet 5 mg  5 mg Oral Q4HRS PRN Linnette Espinoza M.D.       • Divalproex Sodium (DEPAKOTE) capsule 250 mg  250 mg Oral Q8HRS Linnette Espinoza M.D.   250 mg at 01/24/18 0559   • Respiratory Care per Protocol   Nebulization Continuous RT Chandu Justice M.D.       • senna-docusate (PERICOLACE or SENOKOT S) 8.6-50 MG per tablet 2 Tab  2 Tab Oral BID Chandu Justice M.D.   2 Tab at 01/24/18 0756    And   • polyethylene glycol/lytes (MIRALAX) PACKET 1 Packet  1 Packet Oral QDAY PRN Chandu Justice M.D.        And   • magnesium hydroxide (MILK OF MAGNESIA) suspension 30 mL  30 mL Oral QDAY PRN Chandu Justice M.D.        And   • bisacodyl (DULCOLAX) suppository 10 mg  10 mg Rectal QDAY PRN Chandu Justice M.D.       • chlorhexidine (PERIDEX) 0.12 % solution 15 mL  15 mL Mouth/Throat BID Chandu Justice M.D.   15 mL at 01/24/18 0756   • lidocaine (XYLOCAINE) 1%  injection  1-2 mL Tracheal Tube Q30 MIN PRN Chandu Justice M.D.       • ipratropium-albuterol (DUONEB) nebulizer solution 3 mL  3 mL Nebulization Q2HRS PRN (RT) Chandu Justice M.D.       • ipratropium-albuterol (DUONEB) nebulizer solution 3 mL  3 mL Nebulization Q4HRS (RT) Chandu Justice M.D.   3 mL at 01/24/18 1444   • famotidine (PEPCID) tablet 20 mg  20 mg Oral Q12HRS Chandu Justice M.D.   20 mg at 01/24/18 0803    Or   • famotidine (PEPCID) injection 20 mg  20 mg Intravenous Q12HRS Chandu Justice M.D.           Social History     Social History   • Marital status:      Spouse name: N/A   • Number of children: N/A   • Years of education: N/A     Occupational History   • Not on file.     Social History Main Topics   • Smoking status: Current Every Day  "Smoker     Packs/day: 0.50     Years: 20.00     Types: Cigarettes   • Smokeless tobacco: Never Used   • Alcohol use No   • Drug use: No   • Sexual activity: Not on file     Other Topics Concern   • Not on file     Social History Narrative   • No narrative on file       No family history on file.    Allergies:  Erythromycin; Cillins [penicillins]; and Shellfish allergy    Review of Systems:  Negative except as noted in the HPI and 10 point review    Physical Exam:  Blood pressure 115/73, pulse (!) 58, temperature 37 °C (98.6 °F), resp. rate (!) 21, height 1.956 m (6' 5\"), weight (!) 136.7 kg (301 lb 5.9 oz), SpO2 97 %.    Constitutional: he is oriented to person, place, and time.  he appears well-developed, but deconditioning.  He is obese. No distress.   Head: Normocephalic and atraumatic.   Neck: Normal range of motion. Neck supple. No JVD present. No tracheal deviation present. Tracheostomy in place  Cardiovascular: Normal rate, regular rhythm, normal heart sounds.  Pulmonary/Chest: Currently on the ventilator. Diminished breath sounds on the left.  Left chest wall with recent chest tube incision in the anterolateral chest. No signs of infection.   Abdominal: Obese, Soft, nontender, nondistended.   Musculoskeletal: Normal range of motion. he exhibits mild bilateral lower extremity edema  Neurological: he is alert and oriented to person, place, and time. No gross motor or sensory deficits  Skin: Skin is warm and dry. Bilateral lower extremity venous stasis changes. No rash noted. he is not diaphoretic. No erythema. No pallor.   Psychiatric: he has a normal mood and affect.        Labs:  Recent Labs      01/24/18   0505   WBC  16.3*   RBC  3.33*   HEMOGLOBIN  7.4*   HEMATOCRIT  28.0*   MCV  84.1   MCH  22.2*   MCHC  26.4*   RDW  65.1*   PLATELETCT  366   MPV  10.7     Recent Labs      01/24/18   0505   SODIUM  148*   POTASSIUM  4.1   CHLORIDE  109   CO2  27   GLUCOSE  104*   BUN  47*   CREATININE  1.13   CALCIUM  " 9.6               Radiology:  DX-CHEST-PORTABLE (1 VIEW)   Final Result         1.  Pulmonary edema and/or infiltrates are identified, which are stable since the prior exam.   2.  Large layering left pleural effusion   3.  Cardiomegaly      DX-CHEST-PORTABLE (1 VIEW)   Final Result      1.  There has been interval increase in the large left effusion.   2.  There has been interval progression of changes consistent with pulmonary edema.      DX-ABDOMEN FOR TUBE PLACEMENT   Final Result      Enteric tube projects over the second portion of the duodenum.      Dilated small bowel and colon is seen. This may represent ileus. Obstruction not excluded.      Retrocardiac airspace opacity is again noted.      CT-CHEST (THORAX) W/O    (Results Pending)       Assessment: This is a 52 y.o. with a history of bilateral pleural effusions after coronary bypass 1 month ago with postoperative ventilator-dependent respiratory failure and tracheostomy. No malignant cells identified on prior thoracenteses. Etiology is not clear, but likely secondary to hypoalbuminemia and component of CHF.         Recommendations:   -CT chest to evaluate lungs and pleural space tonight  -Discussed left thoracoscopy with mechanical and chemical (doxycycline) pleurodesis for treatment of the effusion with the patient and his wife. He is currently booked for tomorrow at 10 Am.   The operation was discussed along with the risks, which include but are not limited to bleeding, infection, damage to surrounding structures, recurrent effusion, need for further procedures, prolonged air-leak, pneumonia, heart attack, death.  The benefits and alternatives were also discussed and the patient and his wife wish to proceed.    -Increase nutrition as aggressively as possible. The patient is tolerating tube feeds.   -Repeat coags tonight  -Type and screen. The possibility of transfusion exists as the patient is anemic      Thank you very much for this  consultation.    Chandu Paez M.D.  [facility]  [phone]

## 2018-01-25 NOTE — OR SURGEON
Immediate Post OP Note    PreOp Diagnosis:   1.  Left pleural effusion    PostOp Diagnosis:   1.  Left hemothorax, partially organizing with large component of liquified hematoma    Procedure(s):  1.  LEFT THORACOSCOPY  2.  PARTIAL THORACOSCOPIC DECORTICATION  3.  Mechanical and Chemical (doxycycline) PLEURODESIS      Wound Class: Clean    Surgeon(s):  Chandu Paez M.D.    Anesthesiologist/Type of Anesthesia:  Anesthesiologist: Dustin Mcconnell D.O.  Anesthesia Technician: René Richards/General    Surgical Staff:  Circulator: Brianne Gardner R.N.  Relief Circulator: Mack Calderon R.N.  Scrub Person: Taylor Glez  First Assist: Jerry Hogan R.N.    Specimens:  1.  L pleural fluid for micro  2.  L pleural peel for permanent  3.  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  No obvious signs of infection.     Complications: none apparent    Outcome: Transferred back to CICU in stable condition, ventilated via tracheostomy.    1/25/2018 12:23 PM Chandu Paez

## 2018-01-25 NOTE — WOUND TEAM
Attempted to see patient per wound consult order. Patient just returned from OR with chest tubes placed. Patient not appropriate for assessment right now. Will attempt to see tomorrow. Wound photos reviewed, no emergent wounds to see at this time and will wait until patient is more appropriate for assessing.

## 2018-01-26 ENCOUNTER — APPOINTMENT (OUTPATIENT)
Dept: RADIOLOGY | Facility: MEDICAL CENTER | Age: 53
DRG: 163 | End: 2018-01-26
Attending: INTERNAL MEDICINE
Payer: MEDICARE

## 2018-01-26 LAB
ANION GAP SERPL CALC-SCNC: 9 MMOL/L (ref 0–11.9)
BACTERIA SPEC RESP CULT: NORMAL
BASOPHILS # BLD AUTO: 0.6 % (ref 0–1.8)
BASOPHILS # BLD: 0.1 K/UL (ref 0–0.12)
BUN SERPL-MCNC: 53 MG/DL (ref 8–22)
CALCIUM SERPL-MCNC: 8.4 MG/DL (ref 8.5–10.5)
CHLORIDE SERPL-SCNC: 112 MMOL/L (ref 96–112)
CO2 SERPL-SCNC: 26 MMOL/L (ref 20–33)
CREAT SERPL-MCNC: 1.45 MG/DL (ref 0.5–1.4)
EOSINOPHIL # BLD AUTO: 0.29 K/UL (ref 0–0.51)
EOSINOPHIL NFR BLD: 1.8 % (ref 0–6.9)
ERYTHROCYTE [DISTWIDTH] IN BLOOD BY AUTOMATED COUNT: 64.1 FL (ref 35.9–50)
GLUCOSE SERPL-MCNC: 126 MG/DL (ref 65–99)
GRAM STN SPEC: NORMAL
HCT VFR BLD AUTO: 28 % (ref 42–52)
HGB BLD-MCNC: 7.7 G/DL (ref 14–18)
IMM GRANULOCYTES # BLD AUTO: 0.33 K/UL (ref 0–0.11)
IMM GRANULOCYTES NFR BLD AUTO: 2.1 % (ref 0–0.9)
LYMPHOCYTES # BLD AUTO: 0.9 K/UL (ref 1–4.8)
LYMPHOCYTES NFR BLD: 5.6 % (ref 22–41)
MAGNESIUM SERPL-MCNC: 2.2 MG/DL (ref 1.5–2.5)
MCH RBC QN AUTO: 22.9 PG (ref 27–33)
MCHC RBC AUTO-ENTMCNC: 27.5 G/DL (ref 33.7–35.3)
MCV RBC AUTO: 83.3 FL (ref 81.4–97.8)
MONOCYTES # BLD AUTO: 1.42 K/UL (ref 0–0.85)
MONOCYTES NFR BLD AUTO: 8.9 % (ref 0–13.4)
NEUTROPHILS # BLD AUTO: 12.91 K/UL (ref 1.82–7.42)
NEUTROPHILS NFR BLD: 81 % (ref 44–72)
NRBC # BLD AUTO: 0.04 K/UL
NRBC BLD-RTO: 0.3 /100 WBC
PHOSPHATE SERPL-MCNC: 5 MG/DL (ref 2.5–4.5)
PLATELET # BLD AUTO: 331 K/UL (ref 164–446)
PMV BLD AUTO: 10.5 FL (ref 9–12.9)
POTASSIUM SERPL-SCNC: 3.8 MMOL/L (ref 3.6–5.5)
RBC # BLD AUTO: 3.36 M/UL (ref 4.7–6.1)
SIGNIFICANT IND 70042: NORMAL
SITE SITE: NORMAL
SODIUM SERPL-SCNC: 147 MMOL/L (ref 135–145)
SOURCE SOURCE: NORMAL
WBC # BLD AUTO: 16 K/UL (ref 4.8–10.8)

## 2018-01-26 PROCEDURE — 80048 BASIC METABOLIC PNL TOTAL CA: CPT

## 2018-01-26 PROCEDURE — 85025 COMPLETE CBC W/AUTO DIFF WBC: CPT

## 2018-01-26 PROCEDURE — 97162 PT EVAL MOD COMPLEX 30 MIN: CPT

## 2018-01-26 PROCEDURE — 71045 X-RAY EXAM CHEST 1 VIEW: CPT

## 2018-01-26 PROCEDURE — 700101 HCHG RX REV CODE 250: Performed by: INTERNAL MEDICINE

## 2018-01-26 PROCEDURE — 99233 SBSQ HOSP IP/OBS HIGH 50: CPT | Performed by: INTERNAL MEDICINE

## 2018-01-26 PROCEDURE — A9270 NON-COVERED ITEM OR SERVICE: HCPCS | Performed by: INTERNAL MEDICINE

## 2018-01-26 PROCEDURE — 700102 HCHG RX REV CODE 250 W/ 637 OVERRIDE(OP): Performed by: INTERNAL MEDICINE

## 2018-01-26 PROCEDURE — 94640 AIRWAY INHALATION TREATMENT: CPT

## 2018-01-26 PROCEDURE — G9171 VOICE CURRENT STATUS: HCPCS | Mod: CK

## 2018-01-26 PROCEDURE — 700111 HCHG RX REV CODE 636 W/ 250 OVERRIDE (IP): Performed by: INTERNAL MEDICINE

## 2018-01-26 PROCEDURE — 84100 ASSAY OF PHOSPHORUS: CPT

## 2018-01-26 PROCEDURE — 97166 OT EVAL MOD COMPLEX 45 MIN: CPT

## 2018-01-26 PROCEDURE — G8979 MOBILITY GOAL STATUS: HCPCS | Mod: CJ

## 2018-01-26 PROCEDURE — 94003 VENT MGMT INPAT SUBQ DAY: CPT

## 2018-01-26 PROCEDURE — 83735 ASSAY OF MAGNESIUM: CPT

## 2018-01-26 PROCEDURE — 770022 HCHG ROOM/CARE - ICU (200)

## 2018-01-26 PROCEDURE — G9172 VOICE GOAL STATUS: HCPCS | Mod: CH

## 2018-01-26 PROCEDURE — G8988 SELF CARE GOAL STATUS: HCPCS | Mod: CJ

## 2018-01-26 PROCEDURE — G8978 MOBILITY CURRENT STATUS: HCPCS | Mod: CL

## 2018-01-26 PROCEDURE — G8987 SELF CARE CURRENT STATUS: HCPCS | Mod: CL

## 2018-01-26 PROCEDURE — 92610 EVALUATE SWALLOWING FUNCTION: CPT

## 2018-01-26 RX ADMIN — ENOXAPARIN SODIUM 40 MG: 100 INJECTION SUBCUTANEOUS at 10:34

## 2018-01-26 RX ADMIN — NYSTATIN 500000 UNITS: 100000 SUSPENSION ORAL at 16:57

## 2018-01-26 RX ADMIN — IPRATROPIUM BROMIDE AND ALBUTEROL SULFATE 3 ML: .5; 3 SOLUTION RESPIRATORY (INHALATION) at 07:33

## 2018-01-26 RX ADMIN — ATORVASTATIN CALCIUM 40 MG: 40 TABLET, FILM COATED ORAL at 19:54

## 2018-01-26 RX ADMIN — ASPIRIN 81 MG: 81 TABLET, CHEWABLE ORAL at 07:53

## 2018-01-26 RX ADMIN — TEMAZEPAM 15 MG: 15 CAPSULE ORAL at 20:47

## 2018-01-26 RX ADMIN — CHLORHEXIDINE GLUCONATE 15 ML: 1.2 RINSE ORAL at 07:53

## 2018-01-26 RX ADMIN — IPRATROPIUM BROMIDE AND ALBUTEROL SULFATE 3 ML: .5; 3 SOLUTION RESPIRATORY (INHALATION) at 14:01

## 2018-01-26 RX ADMIN — OXYCODONE HYDROCHLORIDE 5 MG: 5 TABLET ORAL at 23:12

## 2018-01-26 RX ADMIN — STANDARDIZED SENNA CONCENTRATE AND DOCUSATE SODIUM 2 TABLET: 8.6; 5 TABLET, FILM COATED ORAL at 07:52

## 2018-01-26 RX ADMIN — DIVALPROEX SODIUM 250 MG: 125 CAPSULE, COATED PELLETS ORAL at 05:28

## 2018-01-26 RX ADMIN — SPIRONOLACTONE 50 MG: 25 TABLET, FILM COATED ORAL at 07:52

## 2018-01-26 RX ADMIN — IPRATROPIUM BROMIDE AND ALBUTEROL SULFATE 3 ML: .5; 3 SOLUTION RESPIRATORY (INHALATION) at 10:48

## 2018-01-26 RX ADMIN — NYSTATIN 500000 UNITS: 100000 SUSPENSION ORAL at 19:53

## 2018-01-26 RX ADMIN — DIVALPROEX SODIUM 250 MG: 125 CAPSULE, COATED PELLETS ORAL at 13:17

## 2018-01-26 RX ADMIN — DIGOXIN 125 MCG: 125 TABLET ORAL at 16:57

## 2018-01-26 RX ADMIN — AMIODARONE HYDROCHLORIDE 200 MG: 200 TABLET ORAL at 07:52

## 2018-01-26 RX ADMIN — LISINOPRIL 10 MG: 10 TABLET ORAL at 07:52

## 2018-01-26 RX ADMIN — POTASSIUM BICARBONATE 50 MEQ: 25 TABLET, EFFERVESCENT ORAL at 10:34

## 2018-01-26 RX ADMIN — DIVALPROEX SODIUM 250 MG: 125 CAPSULE, COATED PELLETS ORAL at 21:47

## 2018-01-26 RX ADMIN — IPRATROPIUM BROMIDE AND ALBUTEROL SULFATE 3 ML: .5; 3 SOLUTION RESPIRATORY (INHALATION) at 22:05

## 2018-01-26 RX ADMIN — FAMOTIDINE 20 MG: 10 INJECTION, SOLUTION INTRAVENOUS at 07:53

## 2018-01-26 RX ADMIN — STANDARDIZED SENNA CONCENTRATE AND DOCUSATE SODIUM 2 TABLET: 8.6; 5 TABLET, FILM COATED ORAL at 19:53

## 2018-01-26 RX ADMIN — QUETIAPINE FUMARATE 50 MG: 25 TABLET ORAL at 19:54

## 2018-01-26 RX ADMIN — FAMOTIDINE 20 MG: 20 TABLET, FILM COATED ORAL at 19:53

## 2018-01-26 RX ADMIN — POLYETHYLENE GLYCOL 3350 1 PACKET: 17 POWDER, FOR SOLUTION ORAL at 07:51

## 2018-01-26 RX ADMIN — NYSTATIN 500000 UNITS: 100000 SUSPENSION ORAL at 07:53

## 2018-01-26 RX ADMIN — IPRATROPIUM BROMIDE AND ALBUTEROL SULFATE 3 ML: .5; 3 SOLUTION RESPIRATORY (INHALATION) at 18:37

## 2018-01-26 RX ADMIN — IPRATROPIUM BROMIDE AND ALBUTEROL SULFATE 3 ML: .5; 3 SOLUTION RESPIRATORY (INHALATION) at 02:12

## 2018-01-26 RX ADMIN — ALPRAZOLAM 0.5 MG: 0.5 TABLET ORAL at 00:50

## 2018-01-26 RX ADMIN — OXYCODONE HYDROCHLORIDE 5 MG: 5 TABLET ORAL at 00:50

## 2018-01-26 RX ADMIN — NYSTATIN 500000 UNITS: 100000 SUSPENSION ORAL at 13:17

## 2018-01-26 ASSESSMENT — PAIN SCALES - GENERAL
PAINLEVEL_OUTOF10: 0
PAINLEVEL_OUTOF10: 5
PAINLEVEL_OUTOF10: 0
PAINLEVEL_OUTOF10: 7
PAINLEVEL_OUTOF10: 0

## 2018-01-26 ASSESSMENT — COGNITIVE AND FUNCTIONAL STATUS - GENERAL
MOVING TO AND FROM BED TO CHAIR: UNABLE
SUGGESTED CMS G CODE MODIFIER MOBILITY: CL
DRESSING REGULAR LOWER BODY CLOTHING: A LOT
DRESSING REGULAR UPPER BODY CLOTHING: A LITTLE
DAILY ACTIVITIY SCORE: 13
SUGGESTED CMS G CODE MODIFIER DAILY ACTIVITY: CL
PERSONAL GROOMING: A LITTLE
WALKING IN HOSPITAL ROOM: A LITTLE
CLIMB 3 TO 5 STEPS WITH RAILING: A LOT
MOBILITY SCORE: 11
TURNING FROM BACK TO SIDE WHILE IN FLAT BAD: UNABLE
HELP NEEDED FOR BATHING: A LOT
EATING MEALS: TOTAL
TOILETING: A LOT
STANDING UP FROM CHAIR USING ARMS: A LITTLE
MOVING FROM LYING ON BACK TO SITTING ON SIDE OF FLAT BED: UNABLE

## 2018-01-26 ASSESSMENT — GAIT ASSESSMENTS
GAIT LEVEL OF ASSIST: MINIMAL ASSIST
DISTANCE (FEET): 5
ASSISTIVE DEVICE: HAND HELD ASSIST

## 2018-01-26 ASSESSMENT — ACTIVITIES OF DAILY LIVING (ADL): TOILETING: REQUIRES ASSIST

## 2018-01-26 NOTE — CARE PLAN
Problem: Ventilation Defect:  Goal: Ability to achieve and maintain unassisted ventilation or tolerate decreased levels of ventilator support  Outcome: PROGRESSING SLOWER THAN EXPECTED  NO SBT TODAY , PT WAS IN O.R, AND VERY SLEEPY WHEN HE RETURNED.

## 2018-01-26 NOTE — DISCHARGE PLANNING
Medical SW    Referral: Sw attended AM IDT Rounds.    Intervention: Rn reports, BM on 24th, jenkins in place, chest tube in place,      Plan: Sw to assist w/ d/c planning as needed.

## 2018-01-26 NOTE — OP REPORT
Operative note    Date: 1/25/2018    PreOp Diagnosis:   1.  Left pleural effusion     PostOp Diagnosis:   1.  Left hemothorax, partially organizing with large component of liquified hematoma     Procedure(s):  1.  LEFT THORACOSCOPY  2.  PARTIAL THORACOSCOPIC DECORTICATION  3.  Mechanical and Chemical (doxycycline) PLEURODESIS       Wound Class: Clean     Surgeon(s):  Chandu Paez M.D.     Anesthesiologist/Type of Anesthesia:  Anesthesiologist: Dustin Mcconnell D.O.  Anesthesia Technician: René Richards/General     Surgical Staff:  Circulator: Brianne Gardner R.N.  Relief Circulator: Mack Calderon R.N.  Scrub Person: Taylor SUSI Glez  First Assist: Jerry Hogan R.N.     Specimens:  1.  L pleural fluid for micro  2.  L pleural peel for permanent  3.  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  No gross signs of infection.      Complications: none apparent    Indications:  52-year-old male status post coronary bypass, with operative bilateral pleural effusions requiring multiple thoracentesis. The right had resolved, but he had persistent left pleural effusion which brought him back to the hospital from rehab. Thoracoscopy was discussed for diagnosis and management of this pleural effusion. This was discussed in detail with the patient's wife including the risks, benefits, alternatives, and the procedure itself. They wish to proceed    Procedure in detail:  The patient was brought to the operating room and placed on the operating room table in supine position. Gen. anesthesia was induced, and the patient was ventilated via his existing tracheostomy. The patient was placed in right lateral decubitus position exposing the left chest. Care was taken to pad all pressure points. A sterile prep and drape was performed in the usual fashion. A timeout was  performed. Following injection of local anesthetic, a 1 cm incision was made in the posterior axillary line at approximately the 8th intercostal space. Finger sweep confirmed entrance into the left pleural space. A Yankauer suction was used to drain a small amount of fluid such that a thoracoscope to be placed. Initial findings showed what appeared to be liquefied hematoma with old blood as a left hemothorax, and the initial stages of a fibrinous rind layering over the lung and diaphragm. A 2nd thoracoscopy port was placed in the anterior axillary line and the proximal for the 6th intercostal space. The liquefied hemothorax was then drained, totaling approximately 3.2 L. A decortication was then performed, with removal of as much of the fibrous rind as possible. It was possible to decorticate the lateral lower lobe and much of the anterior lobe however the inferior portion of the anterior lobe was difficult to release. The lung was released from the diaphragm and anteriorly and posteriorly however, and test ventilation showed reasonable lung expansion. A mechanical pleurodesis was then performed of the parietal pleura using Bovie scratchpad on the long instrument. Hemostasis was ensured. 228 Hungarian straight chest tubes were placed, one in the apical anterior and one apical posterior positions and sutured in with 0 silk and standard fashion. 500 mg of doxycycline were then placed into the left pleural space and the lung was reinflated under direct vision. The chest tubes were kept to water seal, until the patient reached the ICU. Once again, hemostasis was ensured. The thoracoscopic incisions were closed in 2 layers using 2-0 Vicryl and 4-0 Vicryl, and Dermabond used for the skin. Appropriate chest tube dressing was applied. Sponge, instrument, needle counts are correct ×2. The patient was then allowed to emerge from general anesthesia and was transferred back to the ICU in stable condition.    Chandu Paez  M.D.  [facility]  [phone]

## 2018-01-26 NOTE — THERAPY
"Occupational Therapy Evaluation completed.   Functional Status:  Pt presents to skilled OT services following L thoracoscopy 2/2 pleural effusion, currently undergoing rehab at [facility] post initial admission to the hospital. Pt demonstrates moderate to significant deficits with LB ADLs. Pt primarily limited by decreased aerobic capacity which limits pt's endurance during functional activities. Pt performed bed mobility from a sitting position with sba, max a for donning/doffing socks, STS from eob with min a x2, participated in forward and backward x 2 with HHA of 2 person with cga/min a for safety and multiple line management. Pt would benefit from acute skilled services while in house and post acute recommended prior to d/c home.   Plan of Care: Will benefit from Occupational Therapy 3 times per week  Discharge Recommendations:  Equipment: Will Continue to Assess for Equipment Needs. Post-acute therapy Discharge to a transitional care facility for continued skilled therapy services.    See \"Rehab Therapy-Acute\" Patient Summary Report for complete documentation.    "

## 2018-01-26 NOTE — WOUND TEAM
Patient seen by wound team per consult order. Patient has scattered, dry, intact scabs to left inner thigh and lower leg which are incision sites from a previous surgery, over a month ago. These scabs were left open to air, no dressings indicated. Patient assisted to turn to right side, patient has small area of scar tissue, pink/red that is blanchable on the left buttock. No dressing indicated at this time. Patient is on a waffle overlay to mattress. Discussed patient findings with staff RN, no advanced wound care needs at this time.

## 2018-01-26 NOTE — CARE PLAN
Problem: Nutritional:  Goal: Nutrition support tolerated and meeting greater than 85% of estimated needs  Outcome: PROGRESSING AS EXPECTED  TF @ 50 mL/hr.

## 2018-01-26 NOTE — PROGRESS NOTES
"Progress Note:  1/26/2018, 8:17 AM    S: No acute events. Afebrile, pain reported to be controlled    O:  Blood pressure 115/73, pulse 68, temperature 36.8 °C (98.2 °F), resp. rate 20, height 1.956 m (6' 5\"), weight (!) 138.9 kg (306 lb 3.5 oz), SpO2 99 %.    NAD, awake and alert, on vent via tracheostomy  L chest tubes in place to pleurevac. Small expiratory air leak. Serosanguinous output      A:   Active Hospital Problems    Diagnosis   • Pleural effusion on left [J90]     Priority: High   • Leukocytosis [D72.829]   • Normocytic anemia [D64.9]   • Hypernatremia [E87.0]   • Paroxysmal atrial fibrillation (CMS-HCC) [I48.0]   • Acute respiratory failure with hypoxia (CMS-HCC) [J96.01]   • Chronic systolic CHF (congestive heart failure), NYHA class 4 (CMS-HCC) [I50.22]   • Type 2 diabetes mellitus (CMS-HCC) [E11.9]         P:   Continue L chest tubes to suction x72 hours  Pulmonary toilet/RT  Please call me with questions/concerns/updates  Will follow    Chandu Paez M.D.  [facility]  [phone]    "

## 2018-01-26 NOTE — FLOWSHEET NOTE
01/26/18 1353   Vent Clock   Vent Discontinued Yes   Events/Summary/Plan   Events/Summary/Plan T-TRIAL AS TOLERATED   Trache Weaning   Placed on T-Piece/Collar and Stopped Vent Clock Yes

## 2018-01-26 NOTE — CARE PLAN
Problem: Pain Management  Goal: Pain level will decrease to patient's comfort goal  Pain assessed and managed with pharmacologic and non-pharmacologic measures. Patient encouraged to express discomfort and anxiety.     Problem: Mobility  Goal: Risk for activity intolerance will decrease  Patient able to sit at edge of bed, stand and ambulate with PT/OT.

## 2018-01-26 NOTE — PROGRESS NOTES
Pulmonary Critical Care Progress Note      Date of service:  1/26/2018    Chief Complaint:  Respiratory failure    Interval Events:  24 hour interval history reviewed  Reason for visit:  Respiratory failure, pleural effusion  Unable to provide ROS due to medical condition       - oriented   - SR   - SBT   - PEEP 10   - start Lovenox   - decrease PEEP   - free water      Review of Systems   Unable to perform ROS: Medical condition       Physical Exam   Constitutional:   On vent   HENT:   Head: Normocephalic and atraumatic.   Right Ear: External ear normal.   Left Ear: External ear normal.   Nose: Nose normal.   Mouth/Throat: Oropharynx is clear and moist. No oropharyngeal exudate.   Eyes: Conjunctivae are normal. Pupils are equal, round, and reactive to light. Right eye exhibits no discharge. Left eye exhibits no discharge. No scleral icterus.   Neck: Neck supple. No JVD present. No tracheal deviation present.   Trach in place   Cardiovascular: Exam reveals no gallop and no friction rub.    No murmur heard.  Atrial flutter   Pulmonary/Chest: No stridor. He has no wheezes. He has rales (crackles bilaterally).   2 chest tubes on the left   Abdominal: Soft. Bowel sounds are normal. He exhibits no distension. There is no tenderness. There is no rebound and no guarding.   cortrak in place - tolerating enteral TF   Genitourinary: Penis normal.   Musculoskeletal: He exhibits no tenderness or deformity.   No clubbing or cyanosis   Neurological:   Awake, alert, nods, follows, moves all 4   Skin: Skin is warm and dry. No rash noted. He is not diaphoretic. No erythema. No pallor.       PFSH:  No change.    Respiratory:  [place] Vent Mode: APVCMV, Rate (breaths/min): 20, Vt Target (mL): 540, PEEP/CPAP: 10, FiO2: 30, P Support: 5, Static Compliance (ml / cm H2O): 94, Control VTE (exp VT): 477  Pulse Oximetry: 99 %  Vent waveforms and airway mechanics reviewed in detail.  CXR personally reviewed and compared to prior images  CXR  with improved left effusion    HemoDynamics:  Pulse: 68, Heart Rate (Monitored): 64  NIBP: 109/59       Neuro:    Fluids:  Intake/Output       18 - 18 0659 18 - 18 0659 18 07 - 18 0659       7090-6623 Total 8362-7229 3857-9371 Total 1900-0659 Total       Intake    Other  60  60 120  --  60 60  120  -- 120    Medications (P.O./ Enteral Liquids) 60 60 120 -- 60 60 120 -- 120    Enteral  80  180 260  --  480 480  160  -- 160    Enteral Volume 50 150 200 -- 450 450 100 -- 100    Free Water / Tube Flush 30 30 60 -- 30 30 60 -- 60    Total Intake 140 240 380 -- 540 540 280 -- 280       Output    Urine  800  625 1425  600  650 1250  75  -- 75    Indwelling Cathether   75 -- 75    Drains  --  -- --  --  380 380  70  -- 70    Left Chest Tube 2  -- -- -- -- 50 50 20 -- 20    Left Chest Tube 1 -- -- -- -- 330 330 50 -- 50    Blood  --  -- --  3220  -- 3220  --  -- --    Est. Blood Loss (mL) -- -- -- 3220 -- 3220 -- -- --    Total Output  3820 1030 4850 145 -- 145       Net I/O     -003 -385 -1045 -3820 -490 -4310 135 -- 135        Weight: (!) 138.9 kg (306 lb 3.5 oz)  Recent Labs      18   0505  18   0540  18   0515   SODIUM  148*  145  147*   POTASSIUM  4.1  3.9  3.8   CHLORIDE  109  107  112   CO2  27  29  26   BUN  47*  51*  53*   CREATININE  1.13  1.41*  1.45*   MAGNESIUM  2.4  2.3  2.2   PHOSPHORUS  3.4  3.7  5.0*   CALCIUM  9.6  9.0  8.4*       GI/Nutrition:    Liver Function  Recent Labs      18   0505  18   0540  18   0515   GLUCOSE  104*  92  126*       Heme:  Recent Labs      18   0505  18   0540  18   0515   RBC  3.33*  3.64*  3.36*   HEMOGLOBIN  7.4*  8.0*  7.7*   HEMATOCRIT  28.0*  30.2*  28.0*   PLATELETCT  366  370  331   PROTHROMBTM   --   17.6*   --    APTT   --   33.0   --    INR   --   1.48*   --        Infectious Disease:  Temp  Av.9 °C (98.5 °F)   Min: 36.8 °C (98.2 °F)  Max: 37.1 °C (98.8 °F)    Recent Labs      01/24/18   0505  01/25/18   0540  01/26/18   0515   WBC  16.3*  15.1*  16.0*   NEUTSPOLYS  73.70*  79.30*  81.00*   LYMPHOCYTES  12.30*  7.10*  5.60*   MONOCYTES  3.50  7.90  8.90   EOSINOPHILS  2.60  2.40  1.80   BASOPHILS  0.00  0.70  0.60     Current Facility-Administered Medications   Medication Dose Frequency Provider Last Rate Last Dose   • acetaminophen (TYLENOL) tablet 650 mg  650 mg Q6HRS PRN Linnette Espinoza M.D.       • ondansetron (ZOFRAN) syringe/vial injection 4 mg  4 mg Q4HRS MARLENE Espinoza M.D.       • ondansetron (ZOFRAN ODT) dispertab 4 mg  4 mg Q4HRS PRROSANA Espinoza M.D.       • promethazine (PHENERGAN) tablet 12.5-25 mg  12.5-25 mg Q4HRS MARLENE Espinoza M.D.       • promethazine (PHENERGAN) suppository 12.5-25 mg  12.5-25 mg Q4HRS MARLENE Espinoza M.D.       • prochlorperazine (COMPAZINE) injection 5-10 mg  5-10 mg Q4HRS PRROSANA Espinoza M.D.       • amiodarone (CORDARONE) tablet 200 mg  200 mg Q DAY Linnette Espinoza M.D.   200 mg at 01/26/18 0752   • aspirin (ASA) chewable tab 81 mg  81 mg DAILY Linnette Espinoza M.D.   81 mg at 01/26/18 0753   • atorvastatin (LIPITOR) tablet 40 mg  40 mg QHS Linnette Espinoza M.D.   40 mg at 01/25/18 2132   • digoxin (LANOXIN) tablet 125 mcg  125 mcg DAILY AT 1800 Linnette Espinoza M.D.   125 mcg at 01/25/18 1701   • miconazole 2%-zinc oxide (MILTON) topical cream   Q6HRS PRN Linnette Espinoza M.D.       • nystatin (MYCOSTATIN) 827507 UNIT/ML suspension 500,000 Units  5 mL 4X/DAY Linnette Espinoza M.D.   500,000 Units at 01/26/18 0753   • polyethylene glycol/lytes (MIRALAX) PACKET 1 Packet  1 Packet DAILY Linnette Espinoza M.D.   1 Packet at 01/26/18 0751   • quetiapine (SEROQUEL) tablet 50 mg  50 mg Q EVENING Linnette Espinoza M.D.   50 mg at 01/25/18 2132   • temazepam (RESTORIL) capsule 15 mg  15 mg QHS PRN Linnette Espinoza M.D.   15 mg at 01/25/18 2131   • spironolactone (ALDACTONE) tablet  50 mg  50 mg Q DAY Linnette Espinoza M.D.   50 mg at 01/26/18 0752   • lisinopril (PRINIVIL) 10 MG tablet 10 mg  10 mg Q DAY Linnette Espinoza M.D.   10 mg at 01/26/18 0752   • alprazolam (XANAX) tablet 0.5 mg  0.5 mg Q6HRS PRN Linnette Espinoza M.D.   0.5 mg at 01/26/18 0050   • haloperidol lactate (HALDOL) injection 2-5 mg  2-5 mg Q4HRS PRN Linnette Espinoza M.D.   5 mg at 01/24/18 2328   • morphine (pf) 4 mg/ml injection 2 mg  2 mg Q4HRS PRN Linnette Espinoza M.D.   2 mg at 01/25/18 2323   • nitroglycerin (NITROSTAT) tablet 0.4 mg  0.4 mg Q5 MIN PRN Linnette Espinoza M.D.       • oxycodone immediate-release (ROXICODONE) tablet 5 mg  5 mg Q4HRS PRN Linnette Espinoza M.D.   5 mg at 01/26/18 0050   • Divalproex Sodium (DEPAKOTE) capsule 250 mg  250 mg Q8HRS Linnette Espinoza M.D.   250 mg at 01/26/18 0528   • Respiratory Care per Protocol   Continuous RT Chandu Justice M.D.       • senna-docusate (PERICOLACE or SENOKOT S) 8.6-50 MG per tablet 2 Tab  2 Tab BID Chandu Justice M.D.   2 Tab at 01/26/18 0752    And   • polyethylene glycol/lytes (MIRALAX) PACKET 1 Packet  1 Packet QDAY PRN Chandu Justice M.D.        And   • magnesium hydroxide (MILK OF MAGNESIA) suspension 30 mL  30 mL QDAY PRN Chandu Justice M.D.        And   • bisacodyl (DULCOLAX) suppository 10 mg  10 mg QDAY PRN Chandu Justice M.D.       • chlorhexidine (PERIDEX) 0.12 % solution 15 mL  15 mL BID Chandu Justice M.D.   15 mL at 01/26/18 0753   • lidocaine (XYLOCAINE) 1%  injection  1-2 mL Q30 MIN PRN Chandu Justice M.D.       • ipratropium-albuterol (DUONEB) nebulizer solution 3 mL  3 mL Q2HRS PRN (RT) Chandu Justice M.D.       • ipratropium-albuterol (DUONEB) nebulizer solution 3 mL  3 mL Q4HRS (RT) Chandu Justice M.D.   3 mL at 01/26/18 0733   • famotidine (PEPCID) tablet 20 mg  20 mg Q12HRS Chandu Justice M.D.   20 mg at 01/25/18 2132    Or   • famotidine (PEPCID) injection 20 mg  20 mg  Q12HRS Chandu Justice M.D.   20 mg at 01/26/18 0753     Last reviewed on 1/25/2018 12:01 PM by Mack Calderon R.N.    Quality  Measures:  Labs reviewed, Medications reviewed and Radiology images reviewed  Ceballos catheter: Critically Ill - Requiring Accurate Measurement of Urinary Output        DVT prophylaxis - mechanical: SCDs  Ulcer prophylaxis: Yes  Antibiotics: Treating active infection/contamination beyond 24 hours perioperative coverage          Assessment and Plan:    Acute hypoxemic respiratory failure    VDRF   - cont vent support   - decrease PEEP   - SBT as tolerated    Trach - 12/30    Recurrent left pleural effusion   - organized hemothorax   - VATS with decortication and pleurodesis on 1/25    COPD - no acute exacerbation   - cont BDs    S/P 2V CABG - 12/22   - cont ASA, statin    DM type 2   - follow glucose    Paroxysmal atrial fibrillation/atrial flutter   - rate control   - start prophylactic Lovenox   - cont amiodarone and digoxin    Acute systolic heart failure   - EF 25-30%   - cont digoxin and lisinopril   - cont aldactone    S/P NSTEMI in December 2017   - cont ASA, statin    Hypernatremia   - start free water      I have assessed and reassessed his respiratory status with vent adjustments, airway mechanics, ventilator waveforms.  He is at increased risk for worsening respiratory system dysfunction.    High risk of deterioration and worsening vital organ dysfunction and death without the above critical care interventions.    Critical Care Time:  35 minutes  18200  No time overlap  Time excludes procedures  Discussed with RN, RT, Team, Clinical pharmacist, Hospitalist

## 2018-01-26 NOTE — THERAPY
"Speech Language Therapy Clinical Swallow Evaluation completed.  Functional Status: Patient seen for a blue dye swallow evaluation on this date.  He tolerated tracheal and subglottic suctioning and cuff deflation with stable vital signs (HR 76, SpO2 98% on 35% FiO2).  Speaking valve placed in line with the T-Piece. He followed directives to the oral [facility] exam with diffuse mild weakness of the oral musculature noted and forward tongue posture at rest.  Presentation of PO included single ice chips x10 and teaspoons of nectars x5.  The patient presented with prolonged oral phase, timely initiation of swallow trigger and weak laryngeal elevation upon palpation.  He had coughing x3 with ice chips and reflexive throat clearing x1 with nectars which may be concerning for penetration/aspiration.  Patient stated he had been consuming ice chips freely at [facility] as well as sips of water. Speaking valve was removed after 35 minutes and tracheal suctioning completed with no blue noted in tracheal secretions. Please continue to monitor tracheal secretions over the next 24 hours and document the presence of blue.  Discussed case with MD and received orders for a FEES to be completed early next week.  At this time, recommend the patient continue to be NPO with tube feeding.  SLP following    Recommendations - Diet: Diet / Liquid Recommendation: NPO, Pre-Feeding Trials with SLP Only                          Strategies: to be assessed                          Medication Administration: Medication Administration : Via Gastric Tube  Plan of Care: Will benefit from Speech Therapy 5 times per week  Post-Acute Therapy: Discharge to a transitional care facility for continued skilled therapy services.    See \"Rehab Therapy-Acute\" Patient Summary Report for complete documentation.   "

## 2018-01-26 NOTE — PROGRESS NOTES
[facility] Hospitalist Progress Note    Date of Service: 2018    Chief Complaint  52 y.o. male admitted 2018 with recurrent left pleural effusion.    Interval Problem Update  [facility] and interpreted up with a tracheostomy.  Patient was transferred to [facility], comes back from there due to recurrent pleural effusion.  Now status post left thoracoscopy, partial thoracoscopic Decortication, mechanical and chemical pleurodesis.  Patient seen and examined in the ICU, ICU care given.  Discussed patient condition and plan with Intensivist, RN, RT and charge nurse / hot rounds.    No overnight events  Left chest tube x2  Sinus   Good BP  afebrile  BM on   Tolerating TF at goal  Ceballos in place  Mobilizing  Tolerating spont x 2 hours so far    Consultants/Specialty  Intensivist  Surgery    Disposition  Patient requires additional treatment in the hospital, will need to go back to [facility] when cleared        Review of Systems   Unable to perform ROS: Intubated      Physical Exam  Laboratory/Imaging   Hemodynamics  Temp (24hrs), Av.9 °C (98.5 °F), Min:36.8 °C (98.2 °F), Max:37.1 °C (98.8 °F)   Temperature: 36.8 °C (98.2 °F)  Pulse  Av.8  Min: 57  Max: 107 Heart Rate (Monitored): 64  NIBP: 109/59      Respiratory  [place] Vent Mode: APVCMV, Rate (breaths/min): 20, PEEP/CPAP: 10, PEEP/CPAP: 10, FiO2: 30, P Peak (PIP): 17, P MEAN: 13   Respiration: 20, Pulse Oximetry: 99 %  Chest Tube Group 1 (A) Left STRAIGHT  28-Tube Status / Drainage: Draining;Large;Red, Chest Tube Group 2 (B) Left STRAIGHT 28-Tube Status / Drainage: Draining;Small;Serosanguinous, Chest Tube Group 1 (A) Left STRAIGHT  28-Device: Suction 20 cm Water, Chest Tube Group 2 (B) Left STRAIGHT 28-Device: Suction 20 cm Water  Work Of Breathing / Effort: Vented  RUL Breath Sounds: Coarse Crackles, RML Breath Sounds: Coarse Crackles, RLL Breath Sounds: Coarse Crackles, GENNARO Breath  Sounds: Diminished, LLL Breath Sounds: Diminished    Fluids    Intake/Output Summary (Last 24 hours) at 01/26/18 0815  Last data filed at 01/26/18 0600   Gross per 24 hour   Intake              540 ml   Output             4850 ml   Net            -4310 ml       Nutrition  Orders Placed This Encounter   Procedures   • Diet NPO     Standing Status:   Standing     Number of Occurrences:   1     Order Specific Question:   Type:     Answer:   Now [1]     Order Specific Question:   Restrict to:     Answer:   Strict [1]     Physical Exam   Constitutional:  Non-toxic appearance. He appears ill. No distress.   HENT:   Head: Normocephalic and atraumatic.   Mouth/Throat: No oropharyngeal exudate.   Eyes: Right eye exhibits no discharge. Left eye exhibits no discharge.   Neck: Neck supple. No edema and no erythema present.   Cardiovascular: Normal rate and regular rhythm.    Murmur heard.  Pulmonary/Chest: No stridor. He has decreased breath sounds (improved ) in the right lower field, the left upper field, the left middle field and the left lower field. He has rales (improved).   Abdominal: Soft. Bowel sounds are normal. He exhibits no distension.   Musculoskeletal: He exhibits edema.   Neurological:   Trach, t-piece   Skin: Skin is warm and dry. He is not diaphoretic.   Psychiatric: He is noncommunicative.   Nursing note and vitals reviewed.      Recent Labs      01/24/18   0505  01/25/18   0540  01/26/18   0515   WBC  16.3*  15.1*  16.0*   RBC  3.33*  3.64*  3.36*   HEMOGLOBIN  7.4*  8.0*  7.7*   HEMATOCRIT  28.0*  30.2*  28.0*   MCV  84.1  83.0  83.3   MCH  22.2*  22.0*  22.9*   MCHC  26.4*  26.5*  27.5*   RDW  65.1*  63.4*  64.1*   PLATELETCT  366  370  331   MPV  10.7  10.4  10.5     Recent Labs      01/24/18   0505  01/25/18   0540  01/26/18   0515   SODIUM  148*  145  147*   POTASSIUM  4.1  3.9  3.8   CHLORIDE  109  107  112   CO2  27  29  26   GLUCOSE  104*  92  126*   BUN  47*  51*  53*   CREATININE  1.13  1.41*   1.45*   CALCIUM  9.6  9.0  8.4*     Recent Labs      01/25/18   0540   APTT  33.0   INR  1.48*                  Assessment/Plan     Pleural effusion on left   Assessment & Plan    -Now status post left thoracoscopy, partial thoracoscopic decortication, mechanical and chemical pleurodesis  - Patient is improved postoperatively, now with good sats on T-piece        Hypernatremia   Assessment & Plan    -Worse, start free water flushes  - Repeat BMP in the morning        Normocytic anemia   Assessment & Plan    - No sign of gross bleeding  - Repeat CBC in the morning          Leukocytosis   Assessment & Plan    - Possibly reactive, stable, repeat CBC in the morning  - Await cultures from surgery, otherwise cultures are pending  - If leukocytosis continues to worsen or patient becomes febrile will start antibiotics        Acute respiratory failure with hypoxia (CMS-HCC)   Assessment & Plan    -Improved postoperatively, now with good sats on T-piece  - Recommendations per intensivist        Paroxysmal atrial fibrillation (CMS-HCC)   Assessment & Plan    - Currently sinus on amiodarone and digoxin  - Avoid anticoagulation at this time        Chronic systolic CHF (congestive heart failure), NYHA class 4 (CMS-HCC)- (present on admission)   Assessment & Plan    - Does have some edema, no need for Lasix at this time, last time Lasix was used he had worsening of his kidney function  - Most recent ejection fraction was 27%  - Medical management is able        Type 2 diabetes mellitus (CMS-HCC)- (present on admission)   Assessment & Plan    - Mildly elevated, no need for coverage at this time  -Start insulin sliding scale if needed            Reviewed items::  Labs reviewed, Medications reviewed and Radiology images reviewed  DVT prophylaxis pharmacological::  Enoxaparin (Lovenox)  DVT prophylaxis - mechanical:  SCDs  Ulcer Prophylaxis::  Yes

## 2018-01-26 NOTE — THERAPY
"Physical Therapy Evaluation completed.   Bed Mobility:  Supine to Sit: Minimal Assist  Transfers: Sit to Stand: Moderate Assist  Gait: Level Of Assist: Minimal Assist with No Equipment Needed       Plan of Care: Will benefit from Physical Therapy 3 times per week  Discharge Recommendations: Equipment: Will Continue to Assess for Equipment Needs.    See \"Rehab Therapy-Acute\" Patient Summary Report for complete documentation.     "

## 2018-01-27 ENCOUNTER — APPOINTMENT (OUTPATIENT)
Dept: RADIOLOGY | Facility: MEDICAL CENTER | Age: 53
DRG: 163 | End: 2018-01-27
Attending: INTERNAL MEDICINE
Payer: MEDICARE

## 2018-01-27 PROBLEM — I48.92 ATRIAL FLUTTER WITH RAPID VENTRICULAR RESPONSE (HCC): Status: ACTIVE | Noted: 2018-01-23

## 2018-01-27 LAB
ANION GAP SERPL CALC-SCNC: 7 MMOL/L (ref 0–11.9)
BASOPHILS # BLD AUTO: 0.7 % (ref 0–1.8)
BASOPHILS # BLD: 0.13 K/UL (ref 0–0.12)
BUN SERPL-MCNC: 44 MG/DL (ref 8–22)
CALCIUM SERPL-MCNC: 8.9 MG/DL (ref 8.5–10.5)
CHLORIDE SERPL-SCNC: 110 MMOL/L (ref 96–112)
CO2 SERPL-SCNC: 29 MMOL/L (ref 20–33)
CREAT SERPL-MCNC: 1.19 MG/DL (ref 0.5–1.4)
EOSINOPHIL # BLD AUTO: 0.59 K/UL (ref 0–0.51)
EOSINOPHIL NFR BLD: 3.4 % (ref 0–6.9)
ERYTHROCYTE [DISTWIDTH] IN BLOOD BY AUTOMATED COUNT: 64.9 FL (ref 35.9–50)
GLUCOSE SERPL-MCNC: 115 MG/DL (ref 65–99)
HCT VFR BLD AUTO: 28.1 % (ref 42–52)
HGB BLD-MCNC: 7.2 G/DL (ref 14–18)
IMM GRANULOCYTES # BLD AUTO: 0.32 K/UL (ref 0–0.11)
IMM GRANULOCYTES NFR BLD AUTO: 1.8 % (ref 0–0.9)
LYMPHOCYTES # BLD AUTO: 1.22 K/UL (ref 1–4.8)
LYMPHOCYTES NFR BLD: 7 % (ref 22–41)
MCH RBC QN AUTO: 21.6 PG (ref 27–33)
MCHC RBC AUTO-ENTMCNC: 25.6 G/DL (ref 33.7–35.3)
MCV RBC AUTO: 84.1 FL (ref 81.4–97.8)
MONOCYTES # BLD AUTO: 1.46 K/UL (ref 0–0.85)
MONOCYTES NFR BLD AUTO: 8.3 % (ref 0–13.4)
NEUTROPHILS # BLD AUTO: 13.78 K/UL (ref 1.82–7.42)
NEUTROPHILS NFR BLD: 78.8 % (ref 44–72)
NRBC # BLD AUTO: 0.02 K/UL
NRBC BLD-RTO: 0.1 /100 WBC
PLATELET # BLD AUTO: 335 K/UL (ref 164–446)
PMV BLD AUTO: 10.5 FL (ref 9–12.9)
POTASSIUM SERPL-SCNC: 4 MMOL/L (ref 3.6–5.5)
RBC # BLD AUTO: 3.34 M/UL (ref 4.7–6.1)
SODIUM SERPL-SCNC: 146 MMOL/L (ref 135–145)
WBC # BLD AUTO: 17.5 K/UL (ref 4.8–10.8)

## 2018-01-27 PROCEDURE — 85025 COMPLETE CBC W/AUTO DIFF WBC: CPT

## 2018-01-27 PROCEDURE — 700102 HCHG RX REV CODE 250 W/ 637 OVERRIDE(OP): Performed by: INTERNAL MEDICINE

## 2018-01-27 PROCEDURE — 94640 AIRWAY INHALATION TREATMENT: CPT

## 2018-01-27 PROCEDURE — 99233 SBSQ HOSP IP/OBS HIGH 50: CPT | Performed by: INTERNAL MEDICINE

## 2018-01-27 PROCEDURE — 700111 HCHG RX REV CODE 636 W/ 250 OVERRIDE (IP): Performed by: INTERNAL MEDICINE

## 2018-01-27 PROCEDURE — 71045 X-RAY EXAM CHEST 1 VIEW: CPT

## 2018-01-27 PROCEDURE — A9270 NON-COVERED ITEM OR SERVICE: HCPCS | Performed by: INTERNAL MEDICINE

## 2018-01-27 PROCEDURE — 700101 HCHG RX REV CODE 250: Performed by: INTERNAL MEDICINE

## 2018-01-27 PROCEDURE — 80048 BASIC METABOLIC PNL TOTAL CA: CPT

## 2018-01-27 PROCEDURE — 770022 HCHG ROOM/CARE - ICU (200)

## 2018-01-27 RX ORDER — DILTIAZEM HYDROCHLORIDE 5 MG/ML
10 INJECTION INTRAVENOUS EVERY 4 HOURS PRN
Status: DISCONTINUED | OUTPATIENT
Start: 2018-01-27 | End: 2018-02-01 | Stop reason: HOSPADM

## 2018-01-27 RX ADMIN — DIVALPROEX SODIUM 250 MG: 125 CAPSULE, COATED PELLETS ORAL at 05:01

## 2018-01-27 RX ADMIN — NYSTATIN 500000 UNITS: 100000 SUSPENSION ORAL at 21:15

## 2018-01-27 RX ADMIN — OXYCODONE HYDROCHLORIDE 5 MG: 5 TABLET ORAL at 17:09

## 2018-01-27 RX ADMIN — OXYCODONE HYDROCHLORIDE 5 MG: 5 TABLET ORAL at 21:16

## 2018-01-27 RX ADMIN — SPIRONOLACTONE 50 MG: 25 TABLET, FILM COATED ORAL at 08:27

## 2018-01-27 RX ADMIN — CHLORHEXIDINE GLUCONATE 15 ML: 1.2 RINSE ORAL at 21:14

## 2018-01-27 RX ADMIN — IPRATROPIUM BROMIDE AND ALBUTEROL SULFATE 3 ML: .5; 3 SOLUTION RESPIRATORY (INHALATION) at 02:07

## 2018-01-27 RX ADMIN — DIVALPROEX SODIUM 250 MG: 125 CAPSULE, COATED PELLETS ORAL at 21:14

## 2018-01-27 RX ADMIN — FAMOTIDINE 20 MG: 20 TABLET, FILM COATED ORAL at 08:27

## 2018-01-27 RX ADMIN — IPRATROPIUM BROMIDE AND ALBUTEROL SULFATE 3 ML: .5; 3 SOLUTION RESPIRATORY (INHALATION) at 15:13

## 2018-01-27 RX ADMIN — OXYCODONE HYDROCHLORIDE 5 MG: 5 TABLET ORAL at 11:40

## 2018-01-27 RX ADMIN — NYSTATIN 500000 UNITS: 100000 SUSPENSION ORAL at 17:09

## 2018-01-27 RX ADMIN — IPRATROPIUM BROMIDE AND ALBUTEROL SULFATE 3 ML: .5; 3 SOLUTION RESPIRATORY (INHALATION) at 21:51

## 2018-01-27 RX ADMIN — ASPIRIN 81 MG: 81 TABLET, CHEWABLE ORAL at 08:27

## 2018-01-27 RX ADMIN — DIGOXIN 125 MCG: 125 TABLET ORAL at 17:09

## 2018-01-27 RX ADMIN — ENOXAPARIN SODIUM 40 MG: 100 INJECTION SUBCUTANEOUS at 08:26

## 2018-01-27 RX ADMIN — NYSTATIN 500000 UNITS: 100000 SUSPENSION ORAL at 13:59

## 2018-01-27 RX ADMIN — ALPRAZOLAM 0.5 MG: 0.5 TABLET ORAL at 01:30

## 2018-01-27 RX ADMIN — CHLORHEXIDINE GLUCONATE 15 ML: 1.2 RINSE ORAL at 08:28

## 2018-01-27 RX ADMIN — LISINOPRIL 10 MG: 10 TABLET ORAL at 08:28

## 2018-01-27 RX ADMIN — FAMOTIDINE 20 MG: 20 TABLET, FILM COATED ORAL at 21:15

## 2018-01-27 RX ADMIN — POLYETHYLENE GLYCOL 3350 1 PACKET: 17 POWDER, FOR SOLUTION ORAL at 08:28

## 2018-01-27 RX ADMIN — IPRATROPIUM BROMIDE AND ALBUTEROL SULFATE 3 ML: .5; 3 SOLUTION RESPIRATORY (INHALATION) at 11:20

## 2018-01-27 RX ADMIN — STANDARDIZED SENNA CONCENTRATE AND DOCUSATE SODIUM 2 TABLET: 8.6; 5 TABLET, FILM COATED ORAL at 21:15

## 2018-01-27 RX ADMIN — NYSTATIN 500000 UNITS: 100000 SUSPENSION ORAL at 08:28

## 2018-01-27 RX ADMIN — AMIODARONE HYDROCHLORIDE 200 MG: 200 TABLET ORAL at 08:27

## 2018-01-27 RX ADMIN — IPRATROPIUM BROMIDE AND ALBUTEROL SULFATE 3 ML: .5; 3 SOLUTION RESPIRATORY (INHALATION) at 18:27

## 2018-01-27 RX ADMIN — QUETIAPINE FUMARATE 50 MG: 25 TABLET ORAL at 21:15

## 2018-01-27 RX ADMIN — DIVALPROEX SODIUM 250 MG: 125 CAPSULE, COATED PELLETS ORAL at 13:59

## 2018-01-27 RX ADMIN — IPRATROPIUM BROMIDE AND ALBUTEROL SULFATE 3 ML: .5; 3 SOLUTION RESPIRATORY (INHALATION) at 08:18

## 2018-01-27 RX ADMIN — STANDARDIZED SENNA CONCENTRATE AND DOCUSATE SODIUM 2 TABLET: 8.6; 5 TABLET, FILM COATED ORAL at 08:27

## 2018-01-27 RX ADMIN — ATORVASTATIN CALCIUM 40 MG: 40 TABLET, FILM COATED ORAL at 21:14

## 2018-01-27 ASSESSMENT — PAIN SCALES - GENERAL
PAINLEVEL_OUTOF10: 4
PAINLEVEL_OUTOF10: 1
PAINLEVEL_OUTOF10: 4
PAINLEVEL_OUTOF10: 0
PAINLEVEL_OUTOF10: 2
PAINLEVEL_OUTOF10: 0
PAINLEVEL_OUTOF10: 3
PAINLEVEL_OUTOF10: 0
PAINLEVEL_OUTOF10: 5
PAINLEVEL_OUTOF10: 0
PAINLEVEL_OUTOF10: 3
PAINLEVEL_OUTOF10: 0

## 2018-01-27 NOTE — PROGRESS NOTES
Patient continually removing leads, pulse oximetry probe, and T-piece. Frequent reorientation and education.

## 2018-01-27 NOTE — PROGRESS NOTES
"Progress Note:  1/27/2018, 7:53 AM    S: No acute events.  T-piece currently    O:  Blood pressure 113/69, pulse 75, temperature 36.7 °C (98.1 °F), resp. rate 20, height 1.956 m (6' 5\"), weight (!) 126.6 kg (279 lb 1.6 oz), SpO2 95 %.    NAD, awake, alert  Breathing without difficulty  Persistent small air leak in 1 of 2 chest tubes, modest serosanguinous drainage      A:   Active Hospital Problems    Diagnosis   • Pleural effusion on left [J90]     Priority: High   • Leukocytosis [D72.829]   • Normocytic anemia [D64.9]   • Hypernatremia [E87.0]   • Paroxysmal atrial fibrillation (CMS-HCC) [I48.0]   • Acute respiratory failure with hypoxia (CMS-HCC) [J96.01]   • Chronic systolic CHF (congestive heart failure), NYHA class 4 (CMS-HCC) [I50.22]   • Type 2 diabetes mellitus (CMS-HCC) [E11.9]         P:   Continue Chest tubes to suction until Monday AM  Encourage pulmonary hygiene as able  Should get out of bed to chair  Management per primary team  I will continue to follow      Chandu Paez M.D.  [facility]  [phone]    "

## 2018-01-27 NOTE — CARE PLAN
Problem: Ventilation Defect:  Goal: Ability to achieve and maintain unassisted ventilation or tolerate decreased levels of ventilator support  Outcome: PROGRESSING AS EXPECTED  Pt. Tolerating t-piece well.  Hopefully can DC vent soon

## 2018-01-27 NOTE — PROGRESS NOTES
"Patient's wife at bedside swearing and yelling at patient. Patient appears distressed and upset, states he is \"done\" and \"wants to leave\". Alerted supervisor, MIN Tenorio. Jona at bedside discussing plan of care with patient and patient's wife.   "

## 2018-01-27 NOTE — PROGRESS NOTES
[facility] Hospitalist Progress Note    Date of Service: 2018    Chief Complaint  52 y.o. male admitted 2018 with recurrent left pleural effusion.    Interval Problem Update  [facility] and interpreted up with a tracheostomy.  Patient was transferred to [facility], comes back from there due to recurrent pleural effusion.  Now status post left thoracoscopy, partial thoracoscopic Decortication, mechanical and chemical pleurodesis.  Patient seen and examined in the ICU, ICU care given.  Discussed patient condition and plan with Intensivist, RN, RT and charge nurse / hot rounds.    Agitated at times  Aflutter 60-90, on oral amio/dig  good bp  Afebrile  No bm since   Tolerating TF at goal  Good uop  Chest tube x 2 in place  Ceballos in place  t piece x 20 hours    Consultants/Specialty  Intensivist  Surgery    Disposition  Patient requires additional treatment in the hospital, will need to go back to [facility] when cleared        Review of Systems   Unable to perform ROS: Intubated      Physical Exam  Laboratory/Imaging   Hemodynamics  Temp (24hrs), Av.8 °C (98.2 °F), Min:36.7 °C (98 °F), Max:36.8 °C (98.3 °F)   Temperature: 36.7 °C (98.1 °F)  Pulse  Av.6  Min: 45  Max: 107 Heart Rate (Monitored): 70  Blood Pressure: 113/69, NIBP: 105/70      Respiratory  Leo Vent Mode: Spont, Rate (breaths/min): 23, PEEP/CPAP: 8, PEEP/CPAP: 8, FiO2: 30, P Peak (PIP): 16, P MEAN: 13 #Aerosol Therapy / Airway Management: T-Piece, Aerosol Humidity Temp (celsius): 31 Respiration: 20, Pulse Oximetry: 95 %, O2 Daily Delivery Respiratory : T-Piece  Chest Tube Group 1 (A) Left STRAIGHT  28-Tube Status / Drainage: Draining;Large;Red, Chest Tube Group 2 (B) Left STRAIGHT 28-Tube Status / Drainage: Draining;Small;Serosanguinous, Chest Tube Group 1 (A) Left STRAIGHT  28-Device: Suction 20 cm Water;Air Leak Present, Chest Tube Group 2 (B) Left STRAIGHT 28-Device:  Suction 20 cm Water  Given By::  (AEROGEN), Work Of Breathing / Effort:  (trached w/t piece)  RUL Breath Sounds: Coarse Crackles, RML Breath Sounds: Coarse Crackles, RLL Breath Sounds: Coarse Crackles, GENNARO Breath Sounds: Coarse Crackles, LLL Breath Sounds: Coarse Crackles    Fluids    Intake/Output Summary (Last 24 hours) at 01/27/18 0807  Last data filed at 01/27/18 0800   Gross per 24 hour   Intake             2640 ml   Output             2112 ml   Net              528 ml       Nutrition  Orders Placed This Encounter   Procedures   • Diet NPO     Standing Status:   Standing     Number of Occurrences:   1     Order Specific Question:   Type:     Answer:   Now [1]     Order Specific Question:   Restrict to:     Answer:   Strict [1]     Physical Exam   Constitutional:  Non-toxic appearance. He appears ill.   HENT:   Head: Normocephalic and atraumatic.   Eyes: Right eye exhibits no discharge. Left eye exhibits no discharge. No scleral icterus.   Neck: Neck supple. No tracheal deviation, no edema and no erythema present.   Cardiovascular: Normal rate and regular rhythm.  Exam reveals no friction rub.    Murmur heard.  Pulmonary/Chest: No stridor. He has decreased breath sounds (improved ) in the right lower field, the left upper field, the left middle field and the left lower field. He has no wheezes. He has rales (improved).   Trach, t-piece   Abdominal: Soft. Bowel sounds are normal.   Musculoskeletal: He exhibits edema.   Neurological:   Somnolent, confused    Skin: Skin is warm and dry. He is not diaphoretic. No erythema.   Psychiatric: He is noncommunicative.   Nursing note and vitals reviewed.      Recent Labs      01/25/18   0540  01/26/18   0515  01/27/18   0439   WBC  15.1*  16.0*  17.5*   RBC  3.64*  3.36*  3.34*   HEMOGLOBIN  8.0*  7.7*  7.2*   HEMATOCRIT  30.2*  28.0*  28.1*   MCV  83.0  83.3  84.1   MCH  22.0*  22.9*  21.6*   MCHC  26.5*  27.5*  25.6*   RDW  63.4*  64.1*  64.9*   PLATELETCT  370  761 697    MPV  10.4  10.5  10.5     Recent Labs      01/25/18   0540  01/26/18   0515  01/27/18   0439   SODIUM  145  147*  146*   POTASSIUM  3.9  3.8  4.0   CHLORIDE  107  112  110   CO2  29  26  29   GLUCOSE  92  126*  115*   BUN  51*  53*  44*   CREATININE  1.41*  1.45*  1.19   CALCIUM  9.0  8.4*  8.9     Recent Labs      01/25/18   0540   APTT  33.0   INR  1.48*                  Assessment/Plan     Pleural effusion on left   Assessment & Plan    -Now status post left thoracoscopy, partial thoracoscopic decortication, mechanical and chemical pleurodesis  - Patient is improved postoperatively, now with good sats on T-piece        Hypernatremia   Assessment & Plan    - Improved on free water flushes, continue  - Repeat BMP in the morning        Normocytic anemia   Assessment & Plan    - No sign of gross bleeding  - Repeat CBC in the morning          Leukocytosis   Assessment & Plan    - Possibly reactive, stable, repeat CBC in the morning  - Await cultures from surgery, otherwise cultures are pending  - If patient worsens will consider antibiotics        Acute respiratory failure with hypoxia (CMS-HCC)   Assessment & Plan    -Improved postoperatively, now with good sats on T-piece  - Recommendations per intensivist        Atrial flutter with rapid ventricular response (CMS-HCC)   Assessment & Plan    - uncontrolled, will add prn diltiazem  - Continue amiodarone and digoxin  - Avoid full dose anticoagulation at this time        Chronic systolic CHF (congestive heart failure), NYHA class 4 (CMS-HCC)- (present on admission)   Assessment & Plan    - Does have some edema, no need for Lasix at this time, last time Lasix was used he had worsening of his kidney function  - Most recent ejection fraction was 27%  - Medical management is able        Type 2 diabetes mellitus (CMS-HCC)- (present on admission)   Assessment & Plan    - Mildly elevated, no need for coverage at this time  -Start insulin sliding scale if needed             Reviewed items::  Labs reviewed, Medications reviewed and Radiology images reviewed  DVT prophylaxis pharmacological::  Enoxaparin (Lovenox)  DVT prophylaxis - mechanical:  SCDs  Ulcer Prophylaxis::  Yes

## 2018-01-27 NOTE — CARE PLAN
Problem: Communication  Goal: The ability to communicate needs accurately and effectively will improve  Outcome: PROGRESSING SLOWER THAN EXPECTED  Patient requiring frequent reorientation, unable to use call light appropriately, hourly rounding in place,     Problem: Skin Integrity  Goal: Risk for impaired skin integrity will decrease  Outcome: PROGRESSING AS EXPECTED  Patient able to turn on his own, waffle cushion in use, heels floated.

## 2018-01-27 NOTE — PROGRESS NOTES
Pulmonary Critical Care Progress Note      Date of service:  1/27/2018    Chief Complaint:  Respiratory failure    Interval Events:  24 hour interval history reviewed  Reason for visit:  Respiratory failure, pleural effusion  Unable to provide ROS due to medical condition       - SR   - atrial flutter   - TF at 75 (goal)      Review of Systems   Unable to perform ROS: Medical condition       Physical Exam   HENT:   Head: Normocephalic and atraumatic.   Right Ear: External ear normal.   Left Ear: External ear normal.   Nose: Nose normal.   Mouth/Throat: Oropharynx is clear and moist. No oropharyngeal exudate.   Eyes: Conjunctivae are normal. Pupils are equal, round, and reactive to light. Right eye exhibits no discharge. Left eye exhibits no discharge. No scleral icterus.   Neck: Neck supple. No JVD present. No tracheal deviation present.   Trach in place   Cardiovascular: Exam reveals no gallop and no friction rub.    No murmur heard.  Atrial flutter   Pulmonary/Chest: No stridor. He has no wheezes. He has rales (scattered crackles left > right).   2 chest tubes on the left   Abdominal: Soft. Bowel sounds are normal. He exhibits no distension. There is no tenderness. There is no rebound and no guarding.   cortrak in place - tolerating enteral TF   Genitourinary: Penis normal.   Musculoskeletal: He exhibits no tenderness or deformity.   No clubbing or cyanosis   Neurological:   Awake, alert, nods, follows, moves all 4   Skin: Skin is warm and dry. No rash noted. He is not diaphoretic. No erythema. No pallor.       PFSH:  No change.    Respiratory:     Pulse Oximetry: 94 %  Vent waveforms and airway mechanics reviewed in detail.  CXR personally reviewed and compared to prior images  CXR with slightly improved LLL opacification    HemoDynamics:  Pulse: 73, Heart Rate (Monitored): 70  Blood Pressure: 115/51, NIBP: 105/70       Neuro:    Fluids:  Intake/Output       01/25/18 0700 - 01/26/18 0659 01/26/18 0700 - 01/27/18  0618 - 1859      1900-0659 Total  Total  Total       Intake    Other  --  60 60  410  30 440  --  -- --    Medications (P.O./ Enteral Liquids) -- 60 60 410 30 440 -- -- --    Enteral  --  480 480  970  1360 2330  --  -- --    Enteral Volume -- 450 450  -- -- --    Free Water / Tube Flush -- 30 30 320 460 780 -- -- --    Total Intake --  1390 2770 -- -- --       Output    Urine  600  650 1250  800  1150 1950  --  -- --    Indwelling Cathether  800 1150 1950 -- -- --    Drains  --  380 380  280  27 307  --  -- --    Left Chest Tube 2  -- 50 50 55 3 58 -- -- --    Left Chest Tube 1 -- 330 330 225 24 249 -- -- --    Blood  3220  -- 3220  --  -- --  --  -- --    Est. Blood Loss (mL) 3220 -- 3220 -- -- -- -- -- --    Total Output 3820 1030 4850 1080 1177 2257 -- -- --       Net I/O     -3820 -490 -4310 300 213 513 -- -- --        Weight: (!) 126.6 kg (279 lb 1.6 oz)  Recent Labs      1815  18   043   SODIUM  145  147*  146*   POTASSIUM  3.9  3.8  4.0   CHLORIDE  107  112  110   CO2  29  26  29   BUN  51*  53*  44*   CREATININE  1.41*  1.45*  1.19   MAGNESIUM  2.3  2.2   --    PHOSPHORUS  3.7  5.0*   --    CALCIUM  9.0  8.4*  8.9       GI/Nutrition:    Liver Function  Recent Labs      18   0515  18   0439   GLUCOSE  92  126*  115*       Heme:  Recent Labs      18   0515  18   043   RBC  3.64*  3.36*  3.34*   HEMOGLOBIN  8.0*  7.7*  7.2*   HEMATOCRIT  30.2*  28.0*  28.1*   PLATELETCT  370  331  335   PROTHROMBTM  17.6*   --    --    APTT  33.0   --    --    INR  1.48*   --    --        Infectious Disease:  Temp  Av.8 °C (98.2 °F)  Min: 36.7 °C (98 °F)  Max: 36.8 °C (98.3 °F)    Recent Labs      18   0540  18   0515  18   0439   WBC  15.1*  16.0*  17.5*   NEUTSPOLYS  79.30*  81.00*  78.80*   LYMPHOCYTES   7.10*  5.60*  7.00*   MONOCYTES  7.90  8.90  8.30   EOSINOPHILS  2.40  1.80  3.40   BASOPHILS  0.70  0.60  0.70     Current Facility-Administered Medications   Medication Dose Frequency Provider Last Rate Last Dose   • enoxaparin (LOVENOX) inj 40 mg  40 mg DAILY Chandu Justice M.D.   40 mg at 01/26/18 1034   • acetaminophen (TYLENOL) tablet 650 mg  650 mg Q6HRS PRN Linnette Espinoza M.D.       • ondansetron (ZOFRAN) syringe/vial injection 4 mg  4 mg Q4HRS PRN Linnette Espinoza M.D.       • ondansetron (ZOFRAN ODT) dispertab 4 mg  4 mg Q4HRS PRN Linnette Espinoza M.D.       • promethazine (PHENERGAN) tablet 12.5-25 mg  12.5-25 mg Q4HRS PRN Linnette Espinoza M.D.       • promethazine (PHENERGAN) suppository 12.5-25 mg  12.5-25 mg Q4HRS PRN Linnette Espinoza M.D.       • prochlorperazine (COMPAZINE) injection 5-10 mg  5-10 mg Q4HRS PRN Linnette Espinoza M.D.       • amiodarone (CORDARONE) tablet 200 mg  200 mg Q DAY Linnette Espinoza M.D.   200 mg at 01/26/18 0752   • aspirin (ASA) chewable tab 81 mg  81 mg DAILY Linnette Espinoza M.D.   81 mg at 01/26/18 0753   • atorvastatin (LIPITOR) tablet 40 mg  40 mg QHS Linnette Espinoza M.D.   40 mg at 01/26/18 1954   • digoxin (LANOXIN) tablet 125 mcg  125 mcg DAILY AT 1800 Linnette Espinoza M.D.   125 mcg at 01/26/18 1657   • miconazole 2%-zinc oxide (MILTON) topical cream   Q6HRS PRN Linnette Espinoza M.D.       • nystatin (MYCOSTATIN) 535987 UNIT/ML suspension 500,000 Units  5 mL 4X/DAY Linnette Espinoza M.D.   500,000 Units at 01/26/18 1953   • polyethylene glycol/lytes (MIRALAX) PACKET 1 Packet  1 Packet DAILY Linnette Espinoza M.D.   1 Packet at 01/26/18 0751   • quetiapine (SEROQUEL) tablet 50 mg  50 mg Q EVENING Linnette Espinoza M.D.   50 mg at 01/26/18 1954   • temazepam (RESTORIL) capsule 15 mg  15 mg QHS PRN Linnette Espinoza M.D.   15 mg at 01/26/18 2047   • spironolactone (ALDACTONE) tablet 50 mg  50 mg Q DAY Linnette Espinoza M.D.   50 mg at 01/26/18 0752   • lisinopril (PRINIVIL) 10 MG  tablet 10 mg  10 mg Q DAY Linnette Espinoza M.D.   10 mg at 01/26/18 0752   • alprazolam (XANAX) tablet 0.5 mg  0.5 mg Q6HRS PRN Linnette Espinoza M.D.   0.5 mg at 01/27/18 0130   • haloperidol lactate (HALDOL) injection 2-5 mg  2-5 mg Q4HRS PRN Linnette Espinoza M.D.   5 mg at 01/24/18 2328   • morphine (pf) 4 mg/ml injection 2 mg  2 mg Q4HRS PRN Linnette Espinoza M.D.   2 mg at 01/25/18 2323   • nitroglycerin (NITROSTAT) tablet 0.4 mg  0.4 mg Q5 MIN PRN Linnette Espinoza M.D.       • oxycodone immediate-release (ROXICODONE) tablet 5 mg  5 mg Q4HRS PRN Linnette Espinoza M.D.   5 mg at 01/26/18 2312   • Divalproex Sodium (DEPAKOTE) capsule 250 mg  250 mg Q8HRS Linnette Espinoza M.D.   250 mg at 01/27/18 0501   • Respiratory Care per Protocol   Continuous RT Chandu Justice M.D.       • senna-docusate (PERICOLACE or SENOKOT S) 8.6-50 MG per tablet 2 Tab  2 Tab BID Chandu Justice M.D.   2 Tab at 01/26/18 1953    And   • polyethylene glycol/lytes (MIRALAX) PACKET 1 Packet  1 Packet QDAY PRN Chandu Justice M.D.        And   • magnesium hydroxide (MILK OF MAGNESIA) suspension 30 mL  30 mL QDAY PRN Chandu Justice M.D.        And   • bisacodyl (DULCOLAX) suppository 10 mg  10 mg QDAY PRN Chandu Justice M.D.       • chlorhexidine (PERIDEX) 0.12 % solution 15 mL  15 mL BID Chandu Justice M.D.   Stopped at 01/26/18 2100   • lidocaine (XYLOCAINE) 1%  injection  1-2 mL Q30 MIN PRN Chandu Justice M.D.       • ipratropium-albuterol (DUONEB) nebulizer solution 3 mL  3 mL Q2HRS PRN (RT) Chandu Justice M.D.       • ipratropium-albuterol (DUONEB) nebulizer solution 3 mL  3 mL Q4HRS (RT) Chandu Justice M.D.   3 mL at 01/27/18 0207   • famotidine (PEPCID) tablet 20 mg  20 mg Q12HRS Chandu Justice M.D.   20 mg at 01/26/18 1953    Or   • famotidine (PEPCID) injection 20 mg  20 mg Q12HRS Chandu Justice M.D.   20 mg at 01/26/18 0753     Last reviewed on 1/25/2018  12:01 PM by Mack Calderon R.N.    Quality  Measures:  Labs reviewed, Medications reviewed and Radiology images reviewed  Ceballos catheter: Critically Ill - Requiring Accurate Measurement of Urinary Output        DVT prophylaxis - mechanical: SCDs  Ulcer prophylaxis: Yes  Antibiotics: Treating active infection/contamination beyond 24 hours perioperative coverage          Assessment and Plan:    Acute hypoxemic respiratory failure    VDRF   - cont vent support   - SBT as tolerated    Trach - 12/30    Recurrent left pleural effusion   - organized hemothorax   - VATS with decortication and pleurodesis on 1/25    COPD - no acute exacerbation   - cont BDs    S/P 2V CABG - 12/22   - cont ASA, statin    DM type 2   - follow glucose    Paroxysmal atrial fibrillation/atrial flutter   - rate control   - cont prophylactic Lovenox   - cont amiodarone and digoxin    Acute systolic heart failure   - EF 25-30%   - cont digoxin and lisinopril   - cont aldactone    S/P NSTEMI in December 2017   - cont ASA, statin    Hypernatremia   - cont free water      I assessed and reassessed his ventilator waveforms, airway mechanics, respiratory status with vent adjustments and SBT's.  He is at increased risk for worsening respiratory system dysfunction.    High risk of deterioration and worsening vital organ dysfunction and death without the above critical care interventions.    Critical Care Time:  34 minutes  [id number]  No time overlap  Time excludes procedures  Discussed with RN, RT, Team, Clinical pharmacist, Hospitalist

## 2018-01-27 NOTE — CARE PLAN
Problem: Ventilation Defect:  Goal: Ability to achieve and maintain unassisted ventilation or tolerate decreased levels of ventilator support  Outcome: PROGRESSING AS EXPECTED  WEANED PT FROM [facility], TO [facility] TO T-PCS. T-PCS TRIAL AS TOLERATED PER DR PASTOR

## 2018-01-27 NOTE — CARE PLAN
Problem: Pain Management  Goal: Pain level will decrease to patient's comfort goal  Assessed patient's pain and implemented non-pharmacologic and pharmacologic measures to manage pain.     Problem: Mobility  Goal: Risk for activity intolerance will decrease  Patient assisted to stand and ambulate to chair with 3 RNs. Patient able to participate in increased activity level.

## 2018-01-28 ENCOUNTER — APPOINTMENT (OUTPATIENT)
Dept: RADIOLOGY | Facility: MEDICAL CENTER | Age: 53
DRG: 163 | End: 2018-01-28
Attending: INTERNAL MEDICINE
Payer: MEDICARE

## 2018-01-28 LAB
ANION GAP SERPL CALC-SCNC: 4 MMOL/L (ref 0–11.9)
BACTERIA TISS AEROBE CULT: NORMAL
BACTERIA WND AEROBE CULT: NORMAL
BASOPHILS # BLD AUTO: 0.7 % (ref 0–1.8)
BASOPHILS # BLD: 0.11 K/UL (ref 0–0.12)
BUN SERPL-MCNC: 39 MG/DL (ref 8–22)
CALCIUM SERPL-MCNC: 8.9 MG/DL (ref 8.5–10.5)
CHLORIDE SERPL-SCNC: 111 MMOL/L (ref 96–112)
CO2 SERPL-SCNC: 30 MMOL/L (ref 20–33)
CREAT SERPL-MCNC: 0.94 MG/DL (ref 0.5–1.4)
EOSINOPHIL # BLD AUTO: 0.76 K/UL (ref 0–0.51)
EOSINOPHIL NFR BLD: 4.8 % (ref 0–6.9)
ERYTHROCYTE [DISTWIDTH] IN BLOOD BY AUTOMATED COUNT: 64.7 FL (ref 35.9–50)
GLUCOSE SERPL-MCNC: 116 MG/DL (ref 65–99)
GRAM STN SPEC: NORMAL
GRAM STN SPEC: NORMAL
HCT VFR BLD AUTO: 28.1 % (ref 42–52)
HGB BLD-MCNC: 7.3 G/DL (ref 14–18)
IMM GRANULOCYTES # BLD AUTO: 0.3 K/UL (ref 0–0.11)
IMM GRANULOCYTES NFR BLD AUTO: 1.9 % (ref 0–0.9)
LYMPHOCYTES # BLD AUTO: 1.07 K/UL (ref 1–4.8)
LYMPHOCYTES NFR BLD: 6.7 % (ref 22–41)
MCH RBC QN AUTO: 21.9 PG (ref 27–33)
MCHC RBC AUTO-ENTMCNC: 26 G/DL (ref 33.7–35.3)
MCV RBC AUTO: 84.4 FL (ref 81.4–97.8)
MONOCYTES # BLD AUTO: 1.39 K/UL (ref 0–0.85)
MONOCYTES NFR BLD AUTO: 8.7 % (ref 0–13.4)
NEUTROPHILS # BLD AUTO: 12.28 K/UL (ref 1.82–7.42)
NEUTROPHILS NFR BLD: 77.2 % (ref 44–72)
NRBC # BLD AUTO: 0.02 K/UL
NRBC BLD-RTO: 0.1 /100 WBC
PLATELET # BLD AUTO: 293 K/UL (ref 164–446)
PMV BLD AUTO: 10.2 FL (ref 9–12.9)
POTASSIUM SERPL-SCNC: 4.4 MMOL/L (ref 3.6–5.5)
RBC # BLD AUTO: 3.33 M/UL (ref 4.7–6.1)
SIGNIFICANT IND 70042: NORMAL
SIGNIFICANT IND 70042: NORMAL
SITE SITE: NORMAL
SITE SITE: NORMAL
SODIUM SERPL-SCNC: 145 MMOL/L (ref 135–145)
SOURCE SOURCE: NORMAL
SOURCE SOURCE: NORMAL
WBC # BLD AUTO: 15.9 K/UL (ref 4.8–10.8)

## 2018-01-28 PROCEDURE — 700102 HCHG RX REV CODE 250 W/ 637 OVERRIDE(OP): Performed by: INTERNAL MEDICINE

## 2018-01-28 PROCEDURE — 700101 HCHG RX REV CODE 250: Performed by: INTERNAL MEDICINE

## 2018-01-28 PROCEDURE — 99233 SBSQ HOSP IP/OBS HIGH 50: CPT | Performed by: INTERNAL MEDICINE

## 2018-01-28 PROCEDURE — 700111 HCHG RX REV CODE 636 W/ 250 OVERRIDE (IP): Performed by: INTERNAL MEDICINE

## 2018-01-28 PROCEDURE — 71045 X-RAY EXAM CHEST 1 VIEW: CPT

## 2018-01-28 PROCEDURE — 80048 BASIC METABOLIC PNL TOTAL CA: CPT

## 2018-01-28 PROCEDURE — 94760 N-INVAS EAR/PLS OXIMETRY 1: CPT

## 2018-01-28 PROCEDURE — A9270 NON-COVERED ITEM OR SERVICE: HCPCS | Performed by: INTERNAL MEDICINE

## 2018-01-28 PROCEDURE — 85025 COMPLETE CBC W/AUTO DIFF WBC: CPT

## 2018-01-28 PROCEDURE — 94640 AIRWAY INHALATION TREATMENT: CPT

## 2018-01-28 PROCEDURE — 770022 HCHG ROOM/CARE - ICU (200)

## 2018-01-28 RX ADMIN — DIGOXIN 125 MCG: 125 TABLET ORAL at 17:44

## 2018-01-28 RX ADMIN — ATORVASTATIN CALCIUM 40 MG: 40 TABLET, FILM COATED ORAL at 20:54

## 2018-01-28 RX ADMIN — NYSTATIN 500000 UNITS: 100000 SUSPENSION ORAL at 08:55

## 2018-01-28 RX ADMIN — NYSTATIN 500000 UNITS: 100000 SUSPENSION ORAL at 17:44

## 2018-01-28 RX ADMIN — NYSTATIN 500000 UNITS: 100000 SUSPENSION ORAL at 14:02

## 2018-01-28 RX ADMIN — IPRATROPIUM BROMIDE AND ALBUTEROL SULFATE 3 ML: .5; 3 SOLUTION RESPIRATORY (INHALATION) at 10:17

## 2018-01-28 RX ADMIN — LISINOPRIL 10 MG: 10 TABLET ORAL at 08:55

## 2018-01-28 RX ADMIN — IPRATROPIUM BROMIDE AND ALBUTEROL SULFATE 3 ML: .5; 3 SOLUTION RESPIRATORY (INHALATION) at 07:12

## 2018-01-28 RX ADMIN — IPRATROPIUM BROMIDE AND ALBUTEROL SULFATE 3 ML: .5; 3 SOLUTION RESPIRATORY (INHALATION) at 18:36

## 2018-01-28 RX ADMIN — STANDARDIZED SENNA CONCENTRATE AND DOCUSATE SODIUM 2 TABLET: 8.6; 5 TABLET, FILM COATED ORAL at 08:54

## 2018-01-28 RX ADMIN — VALPROIC ACID 250 MG: 250 SOLUTION ORAL at 21:47

## 2018-01-28 RX ADMIN — NYSTATIN 500000 UNITS: 100000 SUSPENSION ORAL at 20:55

## 2018-01-28 RX ADMIN — SPIRONOLACTONE 50 MG: 25 TABLET, FILM COATED ORAL at 08:55

## 2018-01-28 RX ADMIN — ASPIRIN 81 MG: 81 TABLET, CHEWABLE ORAL at 08:55

## 2018-01-28 RX ADMIN — ENOXAPARIN SODIUM 40 MG: 100 INJECTION SUBCUTANEOUS at 09:05

## 2018-01-28 RX ADMIN — OXYCODONE HYDROCHLORIDE 5 MG: 5 TABLET ORAL at 14:02

## 2018-01-28 RX ADMIN — DIVALPROEX SODIUM 250 MG: 125 CAPSULE, COATED PELLETS ORAL at 05:11

## 2018-01-28 RX ADMIN — IPRATROPIUM BROMIDE AND ALBUTEROL SULFATE 3 ML: .5; 3 SOLUTION RESPIRATORY (INHALATION) at 22:01

## 2018-01-28 RX ADMIN — IPRATROPIUM BROMIDE AND ALBUTEROL SULFATE 3 ML: .5; 3 SOLUTION RESPIRATORY (INHALATION) at 14:04

## 2018-01-28 RX ADMIN — CHLORHEXIDINE GLUCONATE 15 ML: 1.2 RINSE ORAL at 08:55

## 2018-01-28 RX ADMIN — DIVALPROEX SODIUM 250 MG: 125 CAPSULE, COATED PELLETS ORAL at 14:02

## 2018-01-28 RX ADMIN — FAMOTIDINE 20 MG: 20 TABLET, FILM COATED ORAL at 20:55

## 2018-01-28 RX ADMIN — OXYCODONE HYDROCHLORIDE 5 MG: 5 TABLET ORAL at 04:28

## 2018-01-28 RX ADMIN — FAMOTIDINE 20 MG: 20 TABLET, FILM COATED ORAL at 08:54

## 2018-01-28 RX ADMIN — STANDARDIZED SENNA CONCENTRATE AND DOCUSATE SODIUM 2 TABLET: 8.6; 5 TABLET, FILM COATED ORAL at 20:55

## 2018-01-28 RX ADMIN — POLYETHYLENE GLYCOL 3350 1 PACKET: 17 POWDER, FOR SOLUTION ORAL at 08:54

## 2018-01-28 RX ADMIN — CHLORHEXIDINE GLUCONATE 15 ML: 1.2 RINSE ORAL at 21:00

## 2018-01-28 RX ADMIN — AMIODARONE HYDROCHLORIDE 200 MG: 200 TABLET ORAL at 08:55

## 2018-01-28 RX ADMIN — IPRATROPIUM BROMIDE AND ALBUTEROL SULFATE 3 ML: .5; 3 SOLUTION RESPIRATORY (INHALATION) at 01:47

## 2018-01-28 RX ADMIN — OXYCODONE HYDROCHLORIDE 5 MG: 5 TABLET ORAL at 20:55

## 2018-01-28 RX ADMIN — QUETIAPINE FUMARATE 50 MG: 25 TABLET ORAL at 20:55

## 2018-01-28 ASSESSMENT — PAIN SCALES - GENERAL
PAINLEVEL_OUTOF10: 0
PAINLEVEL_OUTOF10: 5
PAINLEVEL_OUTOF10: 5
PAINLEVEL_OUTOF10: 0
PAINLEVEL_OUTOF10: 6
PAINLEVEL_OUTOF10: 0
PAINLEVEL_OUTOF10: 0

## 2018-01-28 ASSESSMENT — ENCOUNTER SYMPTOMS
TREMORS: 0
WHEEZING: 0
VOMITING: 0
PND: 0
SENSORY CHANGE: 0
DIARRHEA: 0
MYALGIAS: 0
EYE REDNESS: 0
FLANK PAIN: 0
BRUISES/BLEEDS EASILY: 0
FOCAL WEAKNESS: 0
BACK PAIN: 0
EYE PAIN: 0
COUGH: 1
HEADACHES: 0
ORTHOPNEA: 0
NECK PAIN: 0
CONSTIPATION: 0
ABDOMINAL PAIN: 0
SORE THROAT: 0
NAUSEA: 0
PHOTOPHOBIA: 0
FALLS: 0
EYE DISCHARGE: 0
CHILLS: 0
DEPRESSION: 0
HALLUCINATIONS: 0
SINUS PAIN: 0
SPUTUM PRODUCTION: 1
SEIZURES: 0
DIAPHORESIS: 0
DOUBLE VISION: 0
TINGLING: 0
PALPITATIONS: 0
FEVER: 0
POLYDIPSIA: 0
BLURRED VISION: 0
LOSS OF CONSCIOUSNESS: 0
BLOOD IN STOOL: 0
SHORTNESS OF BREATH: 1
SPEECH CHANGE: 0
HEMOPTYSIS: 0
NERVOUS/ANXIOUS: 0
STRIDOR: 0

## 2018-01-28 ASSESSMENT — COPD QUESTIONNAIRES
DURING THE PAST 4 WEEKS HOW MUCH DID YOU FEEL SHORT OF BREATH: MOST  OR ALL OF THE TIME
HAVE YOU SMOKED AT LEAST 100 CIGARETTES IN YOUR ENTIRE LIFE: YES
DO YOU EVER COUGH UP ANY MUCUS OR PHLEGM?: YES, A FEW DAYS A WEEK OR MONTH
COPD SCREENING SCORE: 6

## 2018-01-28 ASSESSMENT — LIFESTYLE VARIABLES: EVER_SMOKED: YES

## 2018-01-28 NOTE — PROGRESS NOTES
Monitor summary:  Aflutter rate 50-70s   10 minutes of non-sustained rate 120-160; 2 second pause  -/.08/-    12 hour chart check.

## 2018-01-28 NOTE — RESPIRATORY CARE
COPD EDUCATION by COPD CLINICAL EDUCATOR  1/28/2018 at 8:56 AM by Dalia Chadwick     Patient reviewed by COPD education team. Patient does not qualify for COPD program.

## 2018-01-28 NOTE — PROGRESS NOTES
[facility] Hospitalist Progress Note    Date of Service: 2018    Chief Complaint  52 y.o. male admitted 2018 with recurrent left pleural effusion.    Interval Problem Update  [facility] and interpreted up with a tracheostomy.  Patient was transferred to [facility], comes back from there due to recurrent pleural effusion.  Now status post left thoracoscopy, partial thoracoscopic Decortication, mechanical and chemical pleurodesis.  Discussed with family at bedside  Patient seen and examined in the ICU, ICU care given.  Discussed patient condition and plan with Intensivist, RN, RT and charge nurse / hot rounds.    No overnight events  Sinus to fib/flutter  No bm since   Afebrile  1645 urine out  Ceballos in place  Mobilizing  t-piece 47 hours    Consultants/Specialty  Intensivist  Surgery    Disposition  Patient requires additional treatment in the hospital, will need to go back to [facility] when cleared        Review of Systems   Unable to perform ROS: Intubated      Physical Exam  Laboratory/Imaging   Hemodynamics  Temp (24hrs), Av.8 °C (98.2 °F), Min:36.7 °C (98.1 °F), Max:36.9 °C (98.5 °F)   Temperature: 36.7 °C (98.1 °F)  Pulse  Av.6  Min: 45  Max: 107 Heart Rate (Monitored): 67  Blood Pressure: 112/57, NIBP: (!) 91/62      Respiratory    #Aerosol Therapy / Airway Management: T-Piece, Aerosol Humidity Temp (celsius): 31 Respiration: 19, Pulse Oximetry: 97 %, O2 Daily Delivery Respiratory : T-Piece  Chest Tube Group 1 (A) Left STRAIGHT  28-Tube Status / Drainage: Draining;Small;Serosanguinous, Chest Tube Group 2 (B) Left STRAIGHT 28-Tube Status / Drainage: Draining;Small;Serosanguinous;Patent, Chest Tube Group 1 (A) Left STRAIGHT  28-Device: Suction 20 cm Water;Air Leak Present;Dry Closed Drainage System, Chest Tube Group 2 (B) Left STRAIGHT 28-Device: Suction 20 cm Water;Dry Closed Drainage System  Given By:: Trache (Aerogen), Work Of  Breathing / Effort: Mild  RUL Breath Sounds: Clear, RML Breath Sounds: Clear;Diminished, RLL Breath Sounds: Diminished, GENNARO Breath Sounds: Clear, LLL Breath Sounds: Diminished    Fluids    Intake/Output Summary (Last 24 hours) at 01/28/18 0748  Last data filed at 01/28/18 0600   Gross per 24 hour   Intake             3170 ml   Output             2035 ml   Net             1135 ml       Nutrition  Orders Placed This Encounter   Procedures   • Diet NPO     Standing Status:   Standing     Number of Occurrences:   1     Order Specific Question:   Type:     Answer:   Now [1]     Order Specific Question:   Restrict to:     Answer:   Strict [1]     Physical Exam   Constitutional:  Non-toxic appearance. He appears ill. No distress.   HENT:   Head: Normocephalic and atraumatic.   Mouth/Throat: No oropharyngeal exudate.   Eyes: Right eye exhibits no discharge. Left eye exhibits no discharge.   Neck: Neck supple. No edema and no erythema present.   Cardiovascular: Normal rate and regular rhythm.    Murmur heard.  Pulmonary/Chest: No stridor. He has decreased breath sounds (improved ) in the right lower field, the left upper field, the left middle field and the left lower field. He has rales (improved).   Trach, t-piece   Abdominal: Soft. Bowel sounds are normal. He exhibits mass (hernia). He exhibits no distension.   Musculoskeletal: He exhibits edema.   Neurological:   Much more awake   Skin: Skin is warm and dry. He is not diaphoretic.   Psychiatric: He is noncommunicative.   Nursing note and vitals reviewed.      Recent Labs      01/26/18 0515 01/27/18   0439 01/28/18   0445   WBC  16.0*  17.5*  15.9*   RBC  3.36*  3.34*  3.33*   HEMOGLOBIN  7.7*  7.2*  7.3*   HEMATOCRIT  28.0*  28.1*  28.1*   MCV  83.3  84.1  84.4   MCH  22.9*  21.6*  21.9*   MCHC  27.5*  25.6*  26.0*   RDW  64.1*  64.9*  64.7*   PLATELETCT  331  335  293   MPV  10.5  10.5  10.2     Recent Labs      01/26/18   0515  01/27/18   0439  01/28/18   0445    SODIUM  147*  146*  145   POTASSIUM  3.8  4.0  4.4   CHLORIDE  112  110  111   CO2  26  29  30   GLUCOSE  126*  115*  116*   BUN  53*  44*  39*   CREATININE  1.45*  1.19  0.94   CALCIUM  8.4*  8.9  8.9                      Assessment/Plan     Pleural effusion on left   Assessment & Plan    -Now status post left thoracoscopy, partial thoracoscopic decortication, mechanical and chemical pleurodesis  - Patient is improved postoperatively, now with good sats on T-piece        Hypernatremia   Assessment & Plan    - Improved on free water flushes, continue  - Repeat BMP in the morning        Normocytic anemia   Assessment & Plan    - No sign of gross bleeding  - Repeat CBC in the morning          Leukocytosis   Assessment & Plan    - Possibly reactive, stable, repeat CBC in the morning  - Await cultures from surgery, otherwise cultures are pending  - If patient worsens will consider antibiotics        Acute respiratory failure with hypoxia (CMS-HCC)   Assessment & Plan    -Improved postoperatively, now with good sats on T-piece  - Recommendations per intensivist        Atrial flutter with rapid ventricular response (CMS-HCC)   Assessment & Plan    - uncontrolled, will add prn diltiazem  - Continue amiodarone and digoxin  - Avoid full dose anticoagulation at this time        Chronic systolic CHF (congestive heart failure), NYHA class 4 (CMS-HCC)- (present on admission)   Assessment & Plan    - Does have some edema, no need for Lasix at this time, last time Lasix was used he had worsening of his kidney function  - Most recent ejection fraction was 27%  - Medical management is able        Type 2 diabetes mellitus (CMS-HCC)- (present on admission)   Assessment & Plan    - Mildly elevated, no need for coverage at this time  -Start insulin sliding scale if needed            Reviewed items::  Labs reviewed, Medications reviewed and Radiology images reviewed  DVT prophylaxis pharmacological::  Enoxaparin (Lovenox)  DVT  prophylaxis - mechanical:  SCDs  Ulcer Prophylaxis::  Yes

## 2018-01-28 NOTE — CARE PLAN
Problem: Skin Integrity  Goal: Risk for impaired skin integrity will decrease    Intervention: Assess risk factors for impaired skin integrity and/or pressure ulcers  Patient turns self in bed.      Problem: Pain Management  Goal: Pain level will decrease to patient's comfort goal    Intervention: Follow pain managment plan developed in collaboration with patient and Interdisciplinary Team  Tolerating current medication regimen.

## 2018-01-28 NOTE — CARE PLAN
Problem: Ventilation Defect:  Goal: Ability to achieve and maintain unassisted ventilation or tolerate decreased levels of ventilator support  Outcome: PROGRESSING AS EXPECTED  Discontinue mechanical ventilation. Progress to decannulation

## 2018-01-28 NOTE — CARE PLAN
Problem: Ventilation Defect:  Goal: Ability to achieve and maintain unassisted ventilation or tolerate decreased levels of ventilator support  Outcome: PROGRESSING SLOWER THAN EXPECTED  Adult Ventilation Update    Total Vent Days: 4  Trache day 29  CMV RR 20, Vt 540, peep 8 FIO2 30%    Patient Lines/Drains/Airways Status    Active Airway     Name: Placement date: Placement time: Site: Days:    Airway Group Standard Cuffed Trach Tracheostomy 8.0 12/31/17   1106   Tracheostomy   27              In the last 24 hours, Patient has been on T-piece 27 hours             Plateau Pressure (Q Shift): 24 (01/26/18 0734)  Static Compliance (ml / cm H2O): 91 (01/26/18 1049)    Patient failed trials because of Barriers to Wean:  (pt awake and alert on trach. PEEP of 10) (01/25/18 1818)  Barriers to SBT    Length of Weaning Trial        Cough: Strong;Productive (01/27/18 1600)  Sputum Amount: Small (01/27/18 1600)  Sputum Color: White (01/27/18 1600)  Sputum Consistency: Thick (01/27/18 1600)    Mobility Group  Activity Performed: Ambulate;Stand;Up to chair (01/27/18 1200)  Time Activity Tolerated: 10 min (01/27/18 1200)  Distance Per Occurrence (ft.): 5 feet (01/27/18 1200)  # of Times Distance was Traveled: 1 (01/27/18 1200)  Assistance / Tolerance: Assistance of Two or More (01/27/18 1200)  Pt Calls for Assistance: No (01/27/18 1200)  Staff Present for Mobilization: RN (01/27/18 1200)  Reason Not Mobilized: Patient refused - pain (01/25/18 1600)  Mobilization Comments: Pain (01/25/18 1600)    Events/Summary/Plan:Patient has been on T-piece for 27 hours

## 2018-01-28 NOTE — PROGRESS NOTES
"Progress Note:  1/28/2018, 7:08 AM    S: No acute events.  Remains on t-piece    O:  Blood pressure 112/57, pulse (!) 58, temperature 36.9 °C (98.5 °F), resp. rate 16, height 1.956 m (6' 5\"), weight (!) 126.6 kg (279 lb 1.6 oz), SpO2 97 %.    NAD, alert  Persistent small air leak in 1 of to chest tubes      A:   Active Hospital Problems    Diagnosis   • Pleural effusion on left [J90]     Priority: High   • Leukocytosis [D72.829]   • Normocytic anemia [D64.9]   • Hypernatremia [E87.0]   • Atrial flutter with rapid ventricular response (CMS-HCC) [I48.92]   • Acute respiratory failure with hypoxia (CMS-HCC) [J96.01]   • Chronic systolic CHF (congestive heart failure), NYHA class 4 (CMS-HCC) [I50.22]   • Type 2 diabetes mellitus (CMS-HCC) [E11.9]         P:   Continue chest tubes to suction.  Will eval for removal of 1 tube tomorrow, depending on air leak  Pulmonary hygiene    Chandu Paez M.D.  [facility]  [phone]    "

## 2018-01-29 ENCOUNTER — APPOINTMENT (OUTPATIENT)
Dept: RADIOLOGY | Facility: MEDICAL CENTER | Age: 53
DRG: 163 | End: 2018-01-29
Attending: INTERNAL MEDICINE
Payer: MEDICARE

## 2018-01-29 LAB
ALBUMIN SERPL BCP-MCNC: 3 G/DL (ref 3.2–4.9)
ALBUMIN/GLOB SERPL: 0.8 G/DL
ALP SERPL-CCNC: 83 U/L (ref 30–99)
ALT SERPL-CCNC: 6 U/L (ref 2–50)
ANION GAP SERPL CALC-SCNC: 6 MMOL/L (ref 0–11.9)
AST SERPL-CCNC: 16 U/L (ref 12–45)
BACTERIA BLD CULT: NORMAL
BACTERIA BLD CULT: NORMAL
BASOPHILS # BLD AUTO: 0.6 % (ref 0–1.8)
BASOPHILS # BLD: 0.1 K/UL (ref 0–0.12)
BILIRUB SERPL-MCNC: 0.5 MG/DL (ref 0.1–1.5)
BUN SERPL-MCNC: 34 MG/DL (ref 8–22)
CALCIUM SERPL-MCNC: 8.4 MG/DL (ref 8.5–10.5)
CHLORIDE SERPL-SCNC: 108 MMOL/L (ref 96–112)
CO2 SERPL-SCNC: 28 MMOL/L (ref 20–33)
CREAT SERPL-MCNC: 0.85 MG/DL (ref 0.5–1.4)
CRP SERPL HS-MCNC: 4.25 MG/DL (ref 0–0.75)
DIGOXIN SERPL-MCNC: 0.9 NG/ML (ref 0.8–2)
EOSINOPHIL # BLD AUTO: 0.88 K/UL (ref 0–0.51)
EOSINOPHIL NFR BLD: 5.6 % (ref 0–6.9)
ERYTHROCYTE [DISTWIDTH] IN BLOOD BY AUTOMATED COUNT: 64.1 FL (ref 35.9–50)
FUNGUS SPEC CULT: NORMAL
GLOBULIN SER CALC-MCNC: 3.8 G/DL (ref 1.9–3.5)
GLUCOSE SERPL-MCNC: 93 MG/DL (ref 65–99)
HCT VFR BLD AUTO: 29.9 % (ref 42–52)
HGB BLD-MCNC: 8 G/DL (ref 14–18)
IMM GRANULOCYTES # BLD AUTO: 0.35 K/UL (ref 0–0.11)
IMM GRANULOCYTES NFR BLD AUTO: 2.2 % (ref 0–0.9)
LYMPHOCYTES # BLD AUTO: 1.15 K/UL (ref 1–4.8)
LYMPHOCYTES NFR BLD: 7.4 % (ref 22–41)
MCH RBC QN AUTO: 22.3 PG (ref 27–33)
MCHC RBC AUTO-ENTMCNC: 26.8 G/DL (ref 33.7–35.3)
MCV RBC AUTO: 83.5 FL (ref 81.4–97.8)
MONOCYTES # BLD AUTO: 1.18 K/UL (ref 0–0.85)
MONOCYTES NFR BLD AUTO: 7.6 % (ref 0–13.4)
NEUTROPHILS # BLD AUTO: 11.92 K/UL (ref 1.82–7.42)
NEUTROPHILS NFR BLD: 76.6 % (ref 44–72)
NRBC # BLD AUTO: 0.03 K/UL
NRBC BLD-RTO: 0.2 /100 WBC
PLATELET # BLD AUTO: 303 K/UL (ref 164–446)
PMV BLD AUTO: 10.4 FL (ref 9–12.9)
POTASSIUM SERPL-SCNC: 4.2 MMOL/L (ref 3.6–5.5)
PREALB SERPL-MCNC: 10 MG/DL (ref 18–38)
PROT SERPL-MCNC: 6.8 G/DL (ref 6–8.2)
RBC # BLD AUTO: 3.58 M/UL (ref 4.7–6.1)
SIGNIFICANT IND 70042: NORMAL
SITE SITE: NORMAL
SODIUM SERPL-SCNC: 142 MMOL/L (ref 135–145)
SOURCE SOURCE: NORMAL
WBC # BLD AUTO: 15.6 K/UL (ref 4.8–10.8)

## 2018-01-29 PROCEDURE — 770022 HCHG ROOM/CARE - ICU (200)

## 2018-01-29 PROCEDURE — 80053 COMPREHEN METABOLIC PANEL: CPT

## 2018-01-29 PROCEDURE — 92612 ENDOSCOPY SWALLOW (FEES) VID: CPT

## 2018-01-29 PROCEDURE — A9270 NON-COVERED ITEM OR SERVICE: HCPCS | Performed by: INTERNAL MEDICINE

## 2018-01-29 PROCEDURE — 94640 AIRWAY INHALATION TREATMENT: CPT

## 2018-01-29 PROCEDURE — 99233 SBSQ HOSP IP/OBS HIGH 50: CPT | Performed by: INTERNAL MEDICINE

## 2018-01-29 PROCEDURE — 700111 HCHG RX REV CODE 636 W/ 250 OVERRIDE (IP): Performed by: INTERNAL MEDICINE

## 2018-01-29 PROCEDURE — 80162 ASSAY OF DIGOXIN TOTAL: CPT

## 2018-01-29 PROCEDURE — 700101 HCHG RX REV CODE 250: Performed by: INTERNAL MEDICINE

## 2018-01-29 PROCEDURE — 71045 X-RAY EXAM CHEST 1 VIEW: CPT

## 2018-01-29 PROCEDURE — 85025 COMPLETE CBC W/AUTO DIFF WBC: CPT

## 2018-01-29 PROCEDURE — G9171 VOICE CURRENT STATUS: HCPCS | Mod: CK

## 2018-01-29 PROCEDURE — 86140 C-REACTIVE PROTEIN: CPT

## 2018-01-29 PROCEDURE — 700102 HCHG RX REV CODE 250 W/ 637 OVERRIDE(OP): Performed by: INTERNAL MEDICINE

## 2018-01-29 PROCEDURE — 84134 ASSAY OF PREALBUMIN: CPT

## 2018-01-29 PROCEDURE — G9172 VOICE GOAL STATUS: HCPCS | Mod: CH

## 2018-01-29 RX ADMIN — ATORVASTATIN CALCIUM 40 MG: 40 TABLET, FILM COATED ORAL at 21:03

## 2018-01-29 RX ADMIN — DIGOXIN 125 MCG: 125 TABLET ORAL at 19:05

## 2018-01-29 RX ADMIN — IPRATROPIUM BROMIDE AND ALBUTEROL SULFATE 3 ML: .5; 3 SOLUTION RESPIRATORY (INHALATION) at 06:40

## 2018-01-29 RX ADMIN — MORPHINE SULFATE 2 MG: 4 INJECTION INTRAVENOUS at 08:45

## 2018-01-29 RX ADMIN — ENOXAPARIN SODIUM 40 MG: 100 INJECTION SUBCUTANEOUS at 08:07

## 2018-01-29 RX ADMIN — VALPROIC ACID 250 MG: 250 SOLUTION ORAL at 13:52

## 2018-01-29 RX ADMIN — FAMOTIDINE 20 MG: 20 TABLET, FILM COATED ORAL at 21:03

## 2018-01-29 RX ADMIN — VALPROIC ACID 250 MG: 250 SOLUTION ORAL at 21:03

## 2018-01-29 RX ADMIN — NYSTATIN 500000 UNITS: 100000 SUSPENSION ORAL at 21:05

## 2018-01-29 RX ADMIN — IPRATROPIUM BROMIDE AND ALBUTEROL SULFATE 3 ML: .5; 3 SOLUTION RESPIRATORY (INHALATION) at 10:45

## 2018-01-29 RX ADMIN — MORPHINE SULFATE 2 MG: 4 INJECTION INTRAVENOUS at 01:31

## 2018-01-29 RX ADMIN — CHLORHEXIDINE GLUCONATE 15 ML: 1.2 RINSE ORAL at 21:03

## 2018-01-29 RX ADMIN — OXYCODONE HYDROCHLORIDE 5 MG: 5 TABLET ORAL at 21:09

## 2018-01-29 RX ADMIN — OXYCODONE HYDROCHLORIDE 5 MG: 5 TABLET ORAL at 02:51

## 2018-01-29 RX ADMIN — STANDARDIZED SENNA CONCENTRATE AND DOCUSATE SODIUM 2 TABLET: 8.6; 5 TABLET, FILM COATED ORAL at 21:03

## 2018-01-29 RX ADMIN — ACETAMINOPHEN 650 MG: 325 TABLET, FILM COATED ORAL at 07:52

## 2018-01-29 RX ADMIN — NYSTATIN 500000 UNITS: 100000 SUSPENSION ORAL at 13:52

## 2018-01-29 RX ADMIN — IPRATROPIUM BROMIDE AND ALBUTEROL SULFATE 3 ML: .5; 3 SOLUTION RESPIRATORY (INHALATION) at 15:15

## 2018-01-29 RX ADMIN — IPRATROPIUM BROMIDE AND ALBUTEROL SULFATE 3 ML: .5; 3 SOLUTION RESPIRATORY (INHALATION) at 22:08

## 2018-01-29 RX ADMIN — IPRATROPIUM BROMIDE AND ALBUTEROL SULFATE 3 ML: .5; 3 SOLUTION RESPIRATORY (INHALATION) at 03:07

## 2018-01-29 RX ADMIN — FAMOTIDINE 20 MG: 20 TABLET, FILM COATED ORAL at 08:02

## 2018-01-29 RX ADMIN — IPRATROPIUM BROMIDE AND ALBUTEROL SULFATE 3 ML: .5; 3 SOLUTION RESPIRATORY (INHALATION) at 18:24

## 2018-01-29 RX ADMIN — AMIODARONE HYDROCHLORIDE 200 MG: 200 TABLET ORAL at 08:07

## 2018-01-29 RX ADMIN — OXYCODONE HYDROCHLORIDE 5 MG: 5 TABLET ORAL at 07:51

## 2018-01-29 RX ADMIN — NYSTATIN 500000 UNITS: 100000 SUSPENSION ORAL at 08:02

## 2018-01-29 RX ADMIN — STANDARDIZED SENNA CONCENTRATE AND DOCUSATE SODIUM 2 TABLET: 8.6; 5 TABLET, FILM COATED ORAL at 08:02

## 2018-01-29 RX ADMIN — ASPIRIN 81 MG: 81 TABLET, CHEWABLE ORAL at 08:02

## 2018-01-29 RX ADMIN — CHLORHEXIDINE GLUCONATE 15 ML: 1.2 RINSE ORAL at 08:02

## 2018-01-29 RX ADMIN — VALPROIC ACID 250 MG: 250 SOLUTION ORAL at 06:00

## 2018-01-29 RX ADMIN — NYSTATIN 500000 UNITS: 100000 SUSPENSION ORAL at 16:57

## 2018-01-29 RX ADMIN — MORPHINE SULFATE 2 MG: 4 INJECTION INTRAVENOUS at 21:09

## 2018-01-29 RX ADMIN — QUETIAPINE FUMARATE 50 MG: 25 TABLET ORAL at 21:03

## 2018-01-29 ASSESSMENT — COPD QUESTIONNAIRES
DURING THE PAST 4 WEEKS HOW MUCH DID YOU FEEL SHORT OF BREATH: MOST  OR ALL OF THE TIME
COPD SCREENING SCORE: 6
DO YOU EVER COUGH UP ANY MUCUS OR PHLEGM?: YES, A FEW DAYS A WEEK OR MONTH
HAVE YOU SMOKED AT LEAST 100 CIGARETTES IN YOUR ENTIRE LIFE: YES

## 2018-01-29 ASSESSMENT — ENCOUNTER SYMPTOMS
LOSS OF CONSCIOUSNESS: 0
SHORTNESS OF BREATH: 0
DIARRHEA: 0
COUGH: 1
MYALGIAS: 0
TINGLING: 0
SHORTNESS OF BREATH: 1
COUGH: 0
DEPRESSION: 0
FEVER: 0
FALLS: 0
SENSORY CHANGE: 0
ABDOMINAL PAIN: 0
VOMITING: 0
PALPITATIONS: 0
FOCAL WEAKNESS: 0
CHILLS: 0
SPUTUM PRODUCTION: 0
WEAKNESS: 0
CONSTIPATION: 0
DIZZINESS: 0
NAUSEA: 0
STRIDOR: 0
HEADACHES: 0

## 2018-01-29 ASSESSMENT — PAIN SCALES - GENERAL
PAINLEVEL_OUTOF10: 0
PAINLEVEL_OUTOF10: 8
PAINLEVEL_OUTOF10: 2
PAINLEVEL_OUTOF10: 0
PAINLEVEL_OUTOF10: 0
PAINLEVEL_OUTOF10: 7
PAINLEVEL_OUTOF10: 2
PAINLEVEL_OUTOF10: 6
PAINLEVEL_OUTOF10: 0
PAINLEVEL_OUTOF10: 4
PAINLEVEL_OUTOF10: 4
PAINLEVEL_OUTOF10: 7

## 2018-01-29 ASSESSMENT — PATIENT HEALTH QUESTIONNAIRE - PHQ9
SUM OF ALL RESPONSES TO PHQ9 QUESTIONS 1 AND 2: 0
2. FEELING DOWN, DEPRESSED, IRRITABLE, OR HOPELESS: NOT AT ALL
SUM OF ALL RESPONSES TO PHQ QUESTIONS 1-9: 0
1. LITTLE INTEREST OR PLEASURE IN DOING THINGS: NOT AT ALL

## 2018-01-29 ASSESSMENT — LIFESTYLE VARIABLES
DO YOU DRINK ALCOHOL: NO
DO YOU DRINK ALCOHOL: NO

## 2018-01-29 NOTE — PROGRESS NOTES
MS    A-fib/A-flutter. Some episodes of bigeminal PVCs.    RI --  QRS 0.10  QT --      12 hour cc complete

## 2018-01-29 NOTE — CARE PLAN
Problem: Bowel/Gastric:  Goal: Normal bowel function is maintained or improved  Outcome: PROGRESSING AS EXPECTED  One very large BM on shift.    Problem: Mobility  Goal: Risk for activity intolerance will decrease  Outcome: PROGRESSING AS EXPECTED  Ambulated pt out of room for first time this hospitalization. Pt showed signs of general weakness, occasionally requesting to lean up against the wall for 1-2 minutes at a time.

## 2018-01-29 NOTE — PROGRESS NOTES
0200- Chest tube dressing changed sterile technique. Pt tolerated.    0530-12hr chart check completed  Monitor Summary:  Afib with flutter  Freq PVCs  QRS 0.10

## 2018-01-29 NOTE — PROGRESS NOTES
"Pulmonary Critical Care Progress Note       Chief Complaint:  Respiratory failure    HPI: \"52 y.o. male who presented with recurrent left pleural effusion. He was previously admitted to the hospital Dec 19 with acute heart failure and was found to have multivessel coronary artery disease on cardiac catheterization. He had two vessel coronary artery bypass graft surgery done on Dec 22 with Dr. Ash and developed acute respiratory failure after surgery. He required ventilator support for hypoxia and had a tracheostomy placed on Dec 30. A left side thoracentesis was done Palomo 3 and he did grow MSSA and klebsiella from sputum culture. He did finish rocephin for treatment of this on Jan 12. However, his left side effusion reaccumulated and he underwent thoracentsis two more times. The last thoracentesis was done Jan 21 with 400mL of bloody fluid removed and CT scan of the chest Jan22 showed rapid reaccumulation and again a large effusion. His hemoglobin did remain stable. The case was discussed with nurse practitioner Silvia Cook for Dr. Ash who agreed with transfer to [facility] for surgical evaluation for possible pleurodesis or VATS. \"    Interval Events:  24 hour interval history reviewed  Reason for visit:  Respiratory failure, pleural effusion   - No acute events overnight, CT x1 pulled this AM   - Neuro: AOx4   - HR: Aflutter in the 50s   - BP: 90s-110s systolic   - GI: swallow study this AM   - UOP: adequate   - Ceballos: yes   - Tm: 37   - Lines: PIV   - PPx: GI pepcid, DVT lovenox    - on T-piece   - CXR (compared to prior): improved edema, CT in place    Yesterday   - SR - atrial flutter   - TF at 75   - CXR with less edema    Review of Systems   Constitutional: Negative for chills and fever.   Respiratory: Positive for cough and shortness of breath (improved). Negative for sputum production and stridor.    Cardiovascular: Positive for chest pain (at CT site).   Gastrointestinal: Negative for abdominal " pain, nausea and vomiting.   Genitourinary: Negative for dysuria.   Musculoskeletal: Negative for myalgias.   Skin: Negative for rash.   Neurological: Negative for dizziness, sensory change and focal weakness.       Physical Exam   Constitutional: He is oriented to person, place, and time. No distress.   HENT:   Head: Normocephalic and atraumatic.   Right Ear: External ear normal.   Left Ear: External ear normal.   Nose: Nose normal.   Mouth/Throat: Oropharynx is clear and moist. No oropharyngeal exudate.   Eyes: Conjunctivae are normal. Pupils are equal, round, and reactive to light. Right eye exhibits no discharge. Left eye exhibits no discharge. No scleral icterus.   Neck: Neck supple. No JVD present. No tracheal deviation present.   Trach in place   Cardiovascular: Intact distal pulses.  An irregular rhythm present. Bradycardia present.    No murmur heard.  Pulmonary/Chest: No stridor. He has decreased breath sounds in the left lower field. He has no wheezes. He has rales.   1 chest tube on the left   Abdominal: Soft. Bowel sounds are normal. He exhibits no distension. There is no tenderness. There is no rebound and no guarding.   cortrak in place - tolerating enteral TF   Genitourinary: Penis normal.   Musculoskeletal: He exhibits no tenderness or deformity.   No clubbing or cyanosis   Neurological: He is alert and oriented to person, place, and time. Gait normal. Gait normal. GCS score is 15.   Skin: Skin is warm and dry. No rash noted. He is not diaphoretic. No erythema. No pallor.   Nursing note and vitals reviewed.      PFSH:  No change.    Respiratory:     Pulse Oximetry: 99 %  CXR personally reviewed and compared to prior images    HemoDynamics:  Pulse: (!) 56, Heart Rate (Monitored): (!) 56  NIBP: 103/52       Neuro:    Fluids:  Intake/Output       01/27/18 0700 - 01/28/18 0659 01/28/18 0700 - 01/29/18 0659 01/29/18 0700 - 01/30/18 0659      9897-5771 8634-1994 Total 3756-3461 7510-0721 Total 9661-4811  3080-7274 Total       Intake    Other  440  90 530  120  -- 120  --  -- --    Medications (P.O./ Enteral Liquids) 440 90 530 120 -- 120 -- -- --    Enteral  1310  1330 2640  1300  1300 2600  --  -- --    Enteral Volume   -- -- --    Free Water / Tube Flush 410 430 840 400 400 800 -- -- --    Total Intake 1750 1420 3170 1420 1300 2720 -- -- --       Output    Urine  825  820 1645  860  1085   --  -- --    Indwelling Cathether  860 1085  -- -- --    Drains  210  180 390  140  110 250  --  -- --    Left Chest Tube 2  20 10 30 60 10 70 -- -- --    Left Chest Tube 1 190 170 360 80 100 180 -- -- --    Stool  --  -- --  --  -- --  --  -- --    Number of Times Stooled -- -- -- 1 x -- 1 x -- -- --    Total Output 1035 1000 2035 1000 1195 2195 -- -- --       Net I/O      420 105 525 -- -- --        Weight: (!) 129.3 kg (285 lb 0.9 oz)  Recent Labs      18   SODIUM  146*  145  142   POTASSIUM  4.0  4.4  4.2   CHLORIDE  110  111  108   CO2  29  30  28   BUN  44*  39*  34*   CREATININE  1.19  0.94  0.85   CALCIUM  8.9  8.9  8.4*       GI/Nutrition:    Liver Function  Recent Labs      18   ALTSGPT   --    --   6   ASTSGOT   --    --   16   ALKPHOSPHAT   --    --   83   TBILIRUBIN   --    --   0.5   PREALBUMIN   --    --   10.0*   GLUCOSE  115*  116*  93       Heme:  Recent Labs      18   RBC  3.34*  3.33*  3.58*   HEMOGLOBIN  7.2*  7.3*  8.0*   HEMATOCRIT  28.1*  28.1*  29.9*   PLATELETCT  335  293  303       Infectious Disease:  Temp  Av.6 °C (97.9 °F)  Min: 36.1 °C (97 °F)  Max: 36.9 °C (98.4 °F)    Recent Labs      18   0439  18   0445  18   0358   WBC  17.5*  15.9*  15.6*   NEUTSPOLYS  78.80*  77.20*  76.60*   LYMPHOCYTES  7.00*  6.70*  7.40*   MONOCYTES  8.30  8.70  7.60   EOSINOPHILS  3.40  4.80  5.60    BASOPHILS  0.70  0.70  0.60   ASTSGOT   --    --   16   ALTSGPT   --    --   6   ALKPHOSPHAT   --    --   83   TBILIRUBIN   --    --   0.5     Current Facility-Administered Medications   Medication Dose Frequency Provider Last Rate Last Dose   • Valproate Sodium (DEPAKENE) oral solution 250 mg  250 mg Q8HRS Silvio Hernandez D.OAlex   250 mg at 01/29/18 0600   • diltiazem (CARDIZEM) injection 10 mg  10 mg Q4HRS PRN Silvio Hernandez D.OAlex       • enoxaparin (LOVENOX) inj 40 mg  40 mg DAILY Chandu Justice M.D.   40 mg at 01/28/18 0905   • acetaminophen (TYLENOL) tablet 650 mg  650 mg Q6HRS PRN Linnette Espinoza M.D.   650 mg at 01/29/18 0752   • ondansetron (ZOFRAN) syringe/vial injection 4 mg  4 mg Q4HRS PRN Linnette Espinoza M.D.       • ondansetron (ZOFRAN ODT) dispertab 4 mg  4 mg Q4HRS PRN Linnette Espinoza M.D.       • promethazine (PHENERGAN) tablet 12.5-25 mg  12.5-25 mg Q4HRS PRN Linnette Espinoza M.D.       • promethazine (PHENERGAN) suppository 12.5-25 mg  12.5-25 mg Q4HRS PRN Linnette Espinoza M.D.       • prochlorperazine (COMPAZINE) injection 5-10 mg  5-10 mg Q4HRS PRN Linnette Espinoza M.D.       • amiodarone (CORDARONE) tablet 200 mg  200 mg Q DAY Linnette Espinoza M.D.   200 mg at 01/28/18 0855   • aspirin (ASA) chewable tab 81 mg  81 mg DAILY Linnette Espinoza M.D.   81 mg at 01/28/18 0855   • atorvastatin (LIPITOR) tablet 40 mg  40 mg QHS Linnette Espinoza M.D.   40 mg at 01/28/18 2054   • digoxin (LANOXIN) tablet 125 mcg  125 mcg DAILY AT 1800 Linnette Espinoza M.D.   125 mcg at 01/28/18 1744   • miconazole 2%-zinc oxide (MILTON) topical cream   Q6HRS PRN Linnette Espinoza M.D.       • nystatin (MYCOSTATIN) 985797 UNIT/ML suspension 500,000 Units  5 mL 4X/DAY Linnette Espinoza M.D.   500,000 Units at 01/28/18 2055   • polyethylene glycol/lytes (MIRALAX) PACKET 1 Packet  1 Packet DAILY Linnette Espinoza M.D.   1 Packet at 01/28/18 0854   • quetiapine (SEROQUEL) tablet 50 mg  50 mg Q EVENING Linnette Espinoza M.D.   50 mg at  01/28/18 2055   • temazepam (RESTORIL) capsule 15 mg  15 mg QHS PRN Linnette Espinoza M.D.   15 mg at 01/26/18 2047   • spironolactone (ALDACTONE) tablet 50 mg  50 mg Q DAY Linnette Espinoza M.D.   50 mg at 01/28/18 0855   • lisinopril (PRINIVIL) 10 MG tablet 10 mg  10 mg Q DAY Linnette Espinoza M.D.   10 mg at 01/28/18 0855   • alprazolam (XANAX) tablet 0.5 mg  0.5 mg Q6HRS PRN Linnette Espinoza M.D.   0.5 mg at 01/27/18 0130   • haloperidol lactate (HALDOL) injection 2-5 mg  2-5 mg Q4HRS PRN Linnette Espinoza M.D.   5 mg at 01/24/18 2328   • morphine (pf) 4 mg/ml injection 2 mg  2 mg Q4HRS PRN Linnette Espinoza M.D.   2 mg at 01/29/18 0131   • nitroglycerin (NITROSTAT) tablet 0.4 mg  0.4 mg Q5 MIN PRN Linnette Espinoza M.D.       • oxycodone immediate-release (ROXICODONE) tablet 5 mg  5 mg Q4HRS PRN Linnette Espinoza M.D.   5 mg at 01/29/18 0751   • Respiratory Care per Protocol   Continuous RT Chandu Justice M.D.       • senna-docusate (PERICOLACE or SENOKOT S) 8.6-50 MG per tablet 2 Tab  2 Tab BID Chandu Justice M.D.   2 Tab at 01/28/18 2055    And   • polyethylene glycol/lytes (MIRALAX) PACKET 1 Packet  1 Packet QDAY PRN Chandu Justice M.D.        And   • magnesium hydroxide (MILK OF MAGNESIA) suspension 30 mL  30 mL QDAY PRN Chandu Justice M.D.        And   • bisacodyl (DULCOLAX) suppository 10 mg  10 mg QDAY PRN Chandu Justice M.D.       • chlorhexidine (PERIDEX) 0.12 % solution 15 mL  15 mL BID Chandu Justice M.D.   15 mL at 01/28/18 2100   • lidocaine (XYLOCAINE) 1%  injection  1-2 mL Q30 MIN PRN Chandu Justice M.D.       • ipratropium-albuterol (DUONEB) nebulizer solution 3 mL  3 mL Q2HRS PRN (RT) Chandu Justice M.D.       • ipratropium-albuterol (DUONEB) nebulizer solution 3 mL  3 mL Q4HRS (RT) Chandu Justice M.D.   3 mL at 01/29/18 0640   • famotidine (PEPCID) tablet 20 mg  20 mg Q12HRS Chandu Justice M.D.   20 mg at 01/28/18 2055     Last  reviewed on 1/25/2018 12:01 PM by Mack Calderon R.N.    Quality  Measures:  Labs reviewed, Medications reviewed and Radiology images reviewed  Ceballos catheter: Critically Ill - Requiring Accurate Measurement of Urinary Output        DVT prophylaxis - mechanical: SCDs  Ulcer prophylaxis: Yes  Antibiotics: Treating active infection/contamination beyond 24 hours perioperative coverage          Assessment and Plan:  Acute hypoxemic respiratory failure   - RT/O2 Protocols   - Titrate supplemental FiO2 to maintain SpO2 >88%    S/P VDRF   - T-piece as tolerated   - may place on ventilator for any respiratory fatigue    Recurrent left pleural effusion   - organized hemothorax   - VATS with decortication and pleurodesis on 1/25   - Chest tube management per surgery    COPD - no acute exacerbation   - cont bronchodilators    S/P 2V CABG - 12/22   - cont ASA, statin    DM type 2   - follow glucose    Paroxysmal atrial fibrillation/atrial flutter   - rate control   - cont prophylactic Lovenox   - continue amiodarone, digoxin    Acute systolic heart failure   - EF 25-30%   - cont current doses of digoxin, lisinopril and aldactone    NSTEMI in December 2017   - cont ASA, statin    Hypernatremia   - resolved   - cont free water      Discussed with RN, RT, Team, Clinical pharmacist, Hospitalist

## 2018-01-29 NOTE — PROGRESS NOTES
"Progress Note:  1/29/2018, 8:45 AM    S: no acute events. oob to chair yesterday    O:  Blood pressure (!) 96/48, pulse (!) 55, temperature 36.9 °C (98.4 °F), resp. rate 15, height 1.956 m (6' 5.01\"), weight (!) 129.3 kg (285 lb 0.9 oz), SpO2 99 %.    NAD awake and alert  L chest tube #1 with small air leak  L chest tube #2 without air leak  Incisions healing appropriately    CXR: no PTX, worsening LLL atelactasis and b/l pulm edema    A:   Active Hospital Problems    Diagnosis   • Pleural effusion on left [J90]     Priority: High   • Leukocytosis [D72.829]   • Normocytic anemia [D64.9]   • Hypernatremia [E87.0]   • Atrial flutter with rapid ventricular response (CMS-HCC) [I48.92]   • Acute respiratory failure with hypoxia (CMS-HCC) [J96.01]   • Chronic systolic CHF (congestive heart failure), NYHA class 4 (CMS-HCC) [I50.22]   • Type 2 diabetes mellitus (CMS-HCC) [E11.9]         P:   Chest tube #2 removed  Remaining chest tube to water seal  Will continue to eval for tube removal once air leak resolves  MUST get OOB to chair QID  Needs to try to walk  Aggressive pulmonary toilet  Consider more aggressive diuresis today if able to tolerate  Please call with questions/updates/concerns    Chandu Paez M.D.  [facility]  [phone]    "

## 2018-01-29 NOTE — THERAPY
"Speech Language Therapy FEES completed.  Functional Status:  The patient presented with mild-moderate pharyngeal dysphagia as seen by reduced sensation, weak pharyngeal squeeze, and reduced tongue base retraction.  Subsequent premature spillage was observed to the valleculae and pyriform sinuses with all consistencies.  Mild-moderate residue was was noted in the valleculae and on the posterior pharyngeal wall with all consistencies followed by strong throat clearing and attempting to expectorate.  Multiple swallowing strategies were attempted but only sips of nectars minimally reduced residue.  Penetration over top of the epiglottis to the laryngeal vestibule was observed with thin liquids and penetration was suspected during the swallow as seen by blue in the laryngeal vestibule with purees and thin liquids and x1 with nectars.  Aspiration was suspected during the swallow with thin liquids as seen by consistent coughing following 100% of thin liquid trials.  At this time, recommend the patient continue tube feeding via Cortrak as his primary source of nutrition.  OK for single ice chips x5/hour with nursing only.  OK for 4oz of nectar thick liquids 3 times daily with RN only.  Speaking valve to be placed prior to initiation of PO and MUST be worn for all PO intake, including ice chips. SLP following   Recommendations - Diet: Diet / Liquid Recommendation: NPO, Pre-Feeding Trials with SLP Only                          Strategies: Direct supervision during meals, No Straws and Head of Bed at 90 Degrees                          Medication Administration: Medication Administration : Via Gastric Tube  Plan of Care: Will benefit from Speech Therapy 5 times per week  Post-Acute Therapy: Discharge to a transitional care facility for continued skilled therapy services.    See \"Rehab Therapy-Acute\" Patient Summary Report for complete documentation.   "

## 2018-01-29 NOTE — PROGRESS NOTES
[facility] Hospitalist Progress Note    Date of Service: 2018    Chief Complaint  52 y.o. male admitted 2018 with recurrent left pleural effusion.    Interval Problem Update  [facility] and interpreted up with a tracheostomy.  Patient was transferred to [facility], comes back from there due to recurrent pleural effusion.  Now status post left thoracoscopy, partial thoracoscopic Decortication, mechanical and chemical pleurodesis.  Discussed with family at bedside  Patient seen and examined in the ICU, ICU care given.  Discussed patient condition and plan with Intensivist, RN, RT and charge nurse / hot rounds.    No overnight events  Pulled one chest tube this morning  Aflutter in 50s  Low bp, meds held  Afebrile  Good BM  Swallow eval this am  Good uop  Ceballos in place  Mobilizing well  t-piece    Consultants/Specialty  Intensivist  Surgery    Disposition  Patient requires additional treatment in the hospital, will need to go back to [facility] when cleared        Review of Systems   Constitutional: Negative for chills, fever and malaise/fatigue.   HENT: Negative for congestion.    Respiratory: Negative for cough, sputum production, shortness of breath and stridor.    Cardiovascular: Positive for chest pain (at chest tube site ). Negative for palpitations and leg swelling.   Gastrointestinal: Negative for abdominal pain, constipation, diarrhea, nausea and vomiting.   Genitourinary: Negative for dysuria and urgency.   Musculoskeletal: Negative for falls and myalgias.   Neurological: Negative for dizziness, tingling, loss of consciousness, weakness and headaches.   Psychiatric/Behavioral: Negative for depression and suicidal ideas.   All other systems reviewed and are negative.     Physical Exam  Laboratory/Imaging   Hemodynamics  Temp (24hrs), Av.6 °C (97.9 °F), Min:36.1 °C (97 °F), Max:36.9 °C (98.4 °F)   Temperature: 36.9 °C (98.4 °F)  Pulse  Avg:  64.9  Min: 45  Max: 107 Heart Rate (Monitored): (!) 56  NIBP: 103/52      Respiratory    #Aerosol Therapy / Airway Management: T-Piece, Aerosol Humidity Temp (celsius): 31 Respiration: 15, Pulse Oximetry: 99 %, O2 Daily Delivery Respiratory : T-Piece  Chest Tube Group 1 (A) Left STRAIGHT  28-Tube Status / Drainage: Draining;Small;Serosanguinous, Chest Tube Group 2 (B) Left STRAIGHT 28-Tube Status / Drainage: Draining;Small;Serosanguinous;Patent, Chest Tube Group 1 (A) Left STRAIGHT  28-Device: Suction 20 cm Water;Air Leak Present, Chest Tube Group 2 (B) Left STRAIGHT 28-Device: Suction 20 cm Water  Given By:: Trache, Work Of Breathing / Effort: Mild  RUL Breath Sounds: Diminished, RML Breath Sounds: Diminished, RLL Breath Sounds: Diminished, GENNARO Breath Sounds: Diminished, LLL Breath Sounds: Diminished    Fluids    Intake/Output Summary (Last 24 hours) at 01/29/18 0757  Last data filed at 01/29/18 0600   Gross per 24 hour   Intake             2720 ml   Output             2195 ml   Net              525 ml       Nutrition  Orders Placed This Encounter   Procedures   • Diet NPO     Standing Status:   Standing     Number of Occurrences:   1     Order Specific Question:   Type:     Answer:   Now [1]     Order Specific Question:   Restrict to:     Answer:   Strict [1]     Physical Exam   Constitutional: He is oriented to person, place, and time.  Non-toxic appearance. He appears ill.   HENT:   Head: Normocephalic and atraumatic.   Eyes: Right eye exhibits no discharge. Left eye exhibits no discharge. No scleral icterus.   Neck: Neck supple. No tracheal deviation, no edema and no erythema present.   Cardiovascular: Normal rate and regular rhythm.  Exam reveals no gallop.    Murmur heard.  Pulmonary/Chest: No stridor. He has decreased breath sounds (improved ) in the right lower field, the left upper field, the left middle field and the left lower field. He has no wheezes. He has rales (improved). He exhibits no tenderness.    Trach, t-piece, speaking valve in place   Abdominal: Soft. Bowel sounds are normal. He exhibits mass (hernia).   Musculoskeletal: He exhibits edema. He exhibits no tenderness.   Neurological: He is alert and oriented to person, place, and time. No cranial nerve deficit.   Skin: Skin is warm and dry. He is not diaphoretic. No erythema.   Nursing note and vitals reviewed.      Recent Labs      01/27/18   0439 01/28/18 0445  01/29/18   0358   WBC  17.5*  15.9*  15.6*   RBC  3.34*  3.33*  3.58*   HEMOGLOBIN  7.2*  7.3*  8.0*   HEMATOCRIT  28.1*  28.1*  29.9*   MCV  84.1  84.4  83.5   MCH  21.6*  21.9*  22.3*   MCHC  25.6*  26.0*  26.8*   RDW  64.9*  64.7*  64.1*   PLATELETCT  335  293  303   MPV  10.5  10.2  10.4     Recent Labs      01/27/18 0439 01/28/18   0445  01/29/18   0358   SODIUM  146*  145  142   POTASSIUM  4.0  4.4  4.2   CHLORIDE  110  111  108   CO2  29  30  28   GLUCOSE  115*  116*  93   BUN  44*  39*  34*   CREATININE  1.19  0.94  0.85   CALCIUM  8.9  8.9  8.4*                      Assessment/Plan     Pleural effusion on left   Assessment & Plan    -Now status post left thoracoscopy, partial thoracoscopic decortication, mechanical and chemical pleurodesis  - Patient is improved postoperatively, now with good sats on T-piece  - 1 chest tube has been removed, the other is at water seal        Hypernatremia   Assessment & Plan    - Improved on free water flushes, continue  - Repeat BMP in the morning        Normocytic anemia   Assessment & Plan    - No sign of gross bleeding  - Repeat CBC in the morning          Leukocytosis   Assessment & Plan    - Possibly reactive, stable, repeat CBC in the morning  - Await cultures from surgery, otherwise cultures are pending  - If patient worsens will consider antibiotics        Acute respiratory failure with hypoxia (CMS-HCC)   Assessment & Plan    -Improved postoperatively, now with good sats on T-piece  - Recommendations per intensivist        Atrial flutter  with rapid ventricular response (CMS-HCC)   Assessment & Plan    -Improved  - Continue amiodarone and digoxin, obtain digoxin level  - Avoid full dose anticoagulation at this time        Chronic systolic CHF (congestive heart failure), NYHA class 4 (CMS-HCC)- (present on admission)   Assessment & Plan    - Does have some edema, no need for Lasix at this time, last time Lasix was used he had worsening of his kidney function  - Most recent ejection fraction was 27%  - Medical management is able        Type 2 diabetes mellitus (CMS-HCC)- (present on admission)   Assessment & Plan    - Mildly elevated, no need for coverage at this time  -Start insulin sliding scale if needed            Reviewed items::  Labs reviewed, Medications reviewed and Radiology images reviewed  DVT prophylaxis pharmacological::  Enoxaparin (Lovenox)  DVT prophylaxis - mechanical:  SCDs  Ulcer Prophylaxis::  Yes

## 2018-01-29 NOTE — CARE PLAN
Problem: Safety  Goal: Will remain free from injury  Outcome: PROGRESSING AS EXPECTED    Goal: Will remain free from falls  Outcome: PROGRESSING AS EXPECTED      Problem: Infection  Goal: Will remain free from infection  Outcome: PROGRESSING AS EXPECTED      Problem: Venous Thromboembolism (VTW)/Deep Vein Thrombosis (DVT) Prevention:  Goal: Patient will participate in Venous Thrombosis (VTE)/Deep Vein Thrombosis (DVT)Prevention Measures  Outcome: PROGRESSING AS EXPECTED

## 2018-01-29 NOTE — CARE PLAN
Problem: Oxygenation:  Goal: Maintain adequate oxygenation dependent on patient condition  Outcome: PROGRESSING AS EXPECTED  Patient is on t-piece aerosol 30% 5L.  Patient has SVN orders Q4 PRN

## 2018-01-29 NOTE — PROGRESS NOTES
Dr. Evans consulted about scheduled morning medications. HR in atrial flutter in the 50s, SBPs  90s-100s.  Order to give scheduled amiodarone and hold lisinopril and spironolactone.  Will continue to monitor.

## 2018-01-29 NOTE — CARE PLAN
Problem: Nutritional:  Goal: Nutrition support tolerated and meeting greater than 85% of estimated needs  Outcome: MET Date Met: 01/29/18  TF @ goal with Peptamen Intense VHP @ 75 ml/hr.   SLP consult pending.   RD will continue to monitor.

## 2018-01-29 NOTE — PROGRESS NOTES
Dr. Chandu Paez to bedside to remove chest tube #2.  Chest tube #1 placed to water seal per order.

## 2018-01-30 ENCOUNTER — APPOINTMENT (OUTPATIENT)
Dept: RADIOLOGY | Facility: MEDICAL CENTER | Age: 53
DRG: 163 | End: 2018-01-30
Attending: INTERNAL MEDICINE
Payer: MEDICARE

## 2018-01-30 LAB
ANION GAP SERPL CALC-SCNC: 7 MMOL/L (ref 0–11.9)
ANISOCYTOSIS BLD QL SMEAR: ABNORMAL
BACTERIA SPEC ANAEROBE CULT: NORMAL
BACTERIA SPEC ANAEROBE CULT: NORMAL
BASOPHILS # BLD AUTO: 0.9 % (ref 0–1.8)
BASOPHILS # BLD: 0.21 K/UL (ref 0–0.12)
BUN SERPL-MCNC: 34 MG/DL (ref 8–22)
CALCIUM SERPL-MCNC: 8.5 MG/DL (ref 8.5–10.5)
CHLORIDE SERPL-SCNC: 105 MMOL/L (ref 96–112)
CO2 SERPL-SCNC: 27 MMOL/L (ref 20–33)
CREAT SERPL-MCNC: 0.87 MG/DL (ref 0.5–1.4)
EOSINOPHIL # BLD AUTO: 1.44 K/UL (ref 0–0.51)
EOSINOPHIL NFR BLD: 6.1 % (ref 0–6.9)
ERYTHROCYTE [DISTWIDTH] IN BLOOD BY AUTOMATED COUNT: 63.3 FL (ref 35.9–50)
GLUCOSE SERPL-MCNC: 129 MG/DL (ref 65–99)
HCT VFR BLD AUTO: 30.8 % (ref 42–52)
HGB BLD-MCNC: 8.2 G/DL (ref 14–18)
IMM GRANULOCYTES # BLD AUTO: 0.96 K/UL (ref 0–0.11)
IMM GRANULOCYTES NFR BLD AUTO: 4.1 % (ref 0–0.9)
LYMPHOCYTES # BLD AUTO: 0.4 K/UL (ref 1–4.8)
LYMPHOCYTES NFR BLD: 1.7 % (ref 22–41)
MANUAL DIFF BLD: NORMAL
MCH RBC QN AUTO: 22 PG (ref 27–33)
MCHC RBC AUTO-ENTMCNC: 26.6 G/DL (ref 33.7–35.3)
MCV RBC AUTO: 82.6 FL (ref 81.4–97.8)
METAMYELOCYTES NFR BLD MANUAL: 0.9 %
MONOCYTES # BLD AUTO: 0.61 K/UL (ref 0–0.85)
MONOCYTES NFR BLD AUTO: 2.6 % (ref 0–13.4)
MORPHOLOGY BLD-IMP: NORMAL
MYELOCYTES NFR BLD MANUAL: 0.9 %
NEUTROPHILS # BLD AUTO: 20.51 K/UL (ref 1.82–7.42)
NEUTROPHILS NFR BLD: 82.6 % (ref 44–72)
NEUTS BAND NFR BLD MANUAL: 4.3 % (ref 0–10)
NRBC # BLD AUTO: 0.03 K/UL
NRBC BLD-RTO: 0.1 /100 WBC
PLATELET # BLD AUTO: 345 K/UL (ref 164–446)
PLATELET BLD QL SMEAR: NORMAL
PMV BLD AUTO: 10.2 FL (ref 9–12.9)
POTASSIUM SERPL-SCNC: 4.4 MMOL/L (ref 3.6–5.5)
RBC # BLD AUTO: 3.73 M/UL (ref 4.7–6.1)
RBC BLD AUTO: PRESENT
SIGNIFICANT IND 70042: NORMAL
SIGNIFICANT IND 70042: NORMAL
SITE SITE: NORMAL
SITE SITE: NORMAL
SODIUM SERPL-SCNC: 139 MMOL/L (ref 135–145)
SOURCE SOURCE: NORMAL
SOURCE SOURCE: NORMAL
TOXIC GRANULES BLD QL SMEAR: SLIGHT
WBC # BLD AUTO: 23.6 K/UL (ref 4.8–10.8)

## 2018-01-30 PROCEDURE — A9270 NON-COVERED ITEM OR SERVICE: HCPCS | Performed by: INTERNAL MEDICINE

## 2018-01-30 PROCEDURE — 85007 BL SMEAR W/DIFF WBC COUNT: CPT

## 2018-01-30 PROCEDURE — 700111 HCHG RX REV CODE 636 W/ 250 OVERRIDE (IP): Performed by: INTERNAL MEDICINE

## 2018-01-30 PROCEDURE — 700102 HCHG RX REV CODE 250 W/ 637 OVERRIDE(OP): Performed by: INTERNAL MEDICINE

## 2018-01-30 PROCEDURE — 770020 HCHG ROOM/CARE - TELE (206)

## 2018-01-30 PROCEDURE — 99232 SBSQ HOSP IP/OBS MODERATE 35: CPT | Performed by: HOSPITALIST

## 2018-01-30 PROCEDURE — 94640 AIRWAY INHALATION TREATMENT: CPT

## 2018-01-30 PROCEDURE — 80048 BASIC METABOLIC PNL TOTAL CA: CPT

## 2018-01-30 PROCEDURE — 51798 US URINE CAPACITY MEASURE: CPT

## 2018-01-30 PROCEDURE — 92523 SPEECH SOUND LANG COMPREHEN: CPT

## 2018-01-30 PROCEDURE — 71045 X-RAY EXAM CHEST 1 VIEW: CPT

## 2018-01-30 PROCEDURE — G9171 VOICE CURRENT STATUS: HCPCS | Mod: CK

## 2018-01-30 PROCEDURE — 700101 HCHG RX REV CODE 250: Performed by: INTERNAL MEDICINE

## 2018-01-30 PROCEDURE — 85027 COMPLETE CBC AUTOMATED: CPT

## 2018-01-30 PROCEDURE — G9172 VOICE GOAL STATUS: HCPCS | Mod: CH

## 2018-01-30 RX ADMIN — SPIRONOLACTONE 50 MG: 25 TABLET, FILM COATED ORAL at 09:40

## 2018-01-30 RX ADMIN — QUETIAPINE FUMARATE 50 MG: 25 TABLET ORAL at 20:29

## 2018-01-30 RX ADMIN — NYSTATIN 500000 UNITS: 100000 SUSPENSION ORAL at 09:40

## 2018-01-30 RX ADMIN — ENOXAPARIN SODIUM 40 MG: 100 INJECTION SUBCUTANEOUS at 09:40

## 2018-01-30 RX ADMIN — NYSTATIN 500000 UNITS: 100000 SUSPENSION ORAL at 16:57

## 2018-01-30 RX ADMIN — FAMOTIDINE 20 MG: 20 TABLET, FILM COATED ORAL at 09:39

## 2018-01-30 RX ADMIN — VALPROIC ACID 250 MG: 250 SOLUTION ORAL at 20:30

## 2018-01-30 RX ADMIN — FAMOTIDINE 20 MG: 20 TABLET, FILM COATED ORAL at 20:29

## 2018-01-30 RX ADMIN — LISINOPRIL 10 MG: 10 TABLET ORAL at 10:21

## 2018-01-30 RX ADMIN — AMIODARONE HYDROCHLORIDE 200 MG: 200 TABLET ORAL at 09:40

## 2018-01-30 RX ADMIN — DIGOXIN 125 MCG: 125 TABLET ORAL at 16:58

## 2018-01-30 RX ADMIN — STANDARDIZED SENNA CONCENTRATE AND DOCUSATE SODIUM 2 TABLET: 8.6; 5 TABLET, FILM COATED ORAL at 20:29

## 2018-01-30 RX ADMIN — IPRATROPIUM BROMIDE AND ALBUTEROL SULFATE 3 ML: .5; 3 SOLUTION RESPIRATORY (INHALATION) at 09:08

## 2018-01-30 RX ADMIN — CHLORHEXIDINE GLUCONATE 15 ML: 1.2 RINSE ORAL at 20:28

## 2018-01-30 RX ADMIN — IPRATROPIUM BROMIDE AND ALBUTEROL SULFATE 3 ML: .5; 3 SOLUTION RESPIRATORY (INHALATION) at 18:44

## 2018-01-30 RX ADMIN — VALPROIC ACID 250 MG: 250 SOLUTION ORAL at 04:56

## 2018-01-30 RX ADMIN — IPRATROPIUM BROMIDE AND ALBUTEROL SULFATE 3 ML: .5; 3 SOLUTION RESPIRATORY (INHALATION) at 06:11

## 2018-01-30 RX ADMIN — STANDARDIZED SENNA CONCENTRATE AND DOCUSATE SODIUM 2 TABLET: 8.6; 5 TABLET, FILM COATED ORAL at 09:39

## 2018-01-30 RX ADMIN — CHLORHEXIDINE GLUCONATE 15 ML: 1.2 RINSE ORAL at 09:39

## 2018-01-30 RX ADMIN — OXYCODONE HYDROCHLORIDE 5 MG: 5 TABLET ORAL at 17:04

## 2018-01-30 RX ADMIN — NYSTATIN 500000 UNITS: 100000 SUSPENSION ORAL at 20:29

## 2018-01-30 RX ADMIN — OXYCODONE HYDROCHLORIDE 5 MG: 5 TABLET ORAL at 03:39

## 2018-01-30 RX ADMIN — ASPIRIN 81 MG: 81 TABLET, CHEWABLE ORAL at 09:39

## 2018-01-30 RX ADMIN — ATORVASTATIN CALCIUM 40 MG: 40 TABLET, FILM COATED ORAL at 20:29

## 2018-01-30 RX ADMIN — VALPROIC ACID 250 MG: 250 SOLUTION ORAL at 16:57

## 2018-01-30 RX ADMIN — IPRATROPIUM BROMIDE AND ALBUTEROL SULFATE 3 ML: .5; 3 SOLUTION RESPIRATORY (INHALATION) at 22:01

## 2018-01-30 RX ADMIN — IPRATROPIUM BROMIDE AND ALBUTEROL SULFATE 3 ML: .5; 3 SOLUTION RESPIRATORY (INHALATION) at 13:59

## 2018-01-30 RX ADMIN — IPRATROPIUM BROMIDE AND ALBUTEROL SULFATE 3 ML: .5; 3 SOLUTION RESPIRATORY (INHALATION) at 02:04

## 2018-01-30 ASSESSMENT — PATIENT HEALTH QUESTIONNAIRE - PHQ9
SUM OF ALL RESPONSES TO PHQ QUESTIONS 1-9: 0
2. FEELING DOWN, DEPRESSED, IRRITABLE, OR HOPELESS: NOT AT ALL
1. LITTLE INTEREST OR PLEASURE IN DOING THINGS: NOT AT ALL
SUM OF ALL RESPONSES TO PHQ9 QUESTIONS 1 AND 2: 0

## 2018-01-30 ASSESSMENT — ENCOUNTER SYMPTOMS
FOCAL WEAKNESS: 0
DIZZINESS: 0
SPUTUM PRODUCTION: 0
FEVER: 0
VOMITING: 0
COUGH: 0
NAUSEA: 0
STRIDOR: 0
MYALGIAS: 0
SENSORY CHANGE: 0
ABDOMINAL PAIN: 0
CHILLS: 0
NERVOUS/ANXIOUS: 1
SHORTNESS OF BREATH: 1

## 2018-01-30 ASSESSMENT — PAIN SCALES - GENERAL
PAINLEVEL_OUTOF10: 0
PAINLEVEL_OUTOF10: 8
PAINLEVEL_OUTOF10: 0
PAINLEVEL_OUTOF10: 4
PAINLEVEL_OUTOF10: 0
PAINLEVEL_OUTOF10: 0

## 2018-01-30 ASSESSMENT — LIFESTYLE VARIABLES
DO YOU DRINK ALCOHOL: NO
DO YOU DRINK ALCOHOL: NO

## 2018-01-30 NOTE — PROGRESS NOTES
[facility] Hospitalist Progress Note    Date of Service: 2018    Chief Complaint  52 y.o. male admitted 2018 with recurrent L pleural effusion.  Recent admit  for CHF exacerbation and found to have multivessl CAD.  S/p CABG x2 vessel on that admission.  Hospital stay complicated by HCAP with MSSA and Klebsiella from sutum.  Had recurrent L side effusion requiring tap on 3 prior occasions last   Interval Problem Update  AFlttr in 60's  AFebrile  TF's to goal  Ambulating  5 litres O2  Pt frustrated as he wanted to go home today to be with family      Consultants/Specialty  Pulmnology  CT Surgery    Disposition  Cont in ICU        ROS   Physical Exam  Laboratory/Imaging   Hemodynamics  Temp (24hrs), Av.8 °C (98.2 °F), Min:36.6 °C (97.9 °F), Max:37 °C (98.6 °F)   Temperature: 37 °C (98.6 °F)  Pulse  Av.6  Min: 40  Max: 107 Heart Rate (Monitored): 70  Blood Pressure: 118/64, NIBP: 102/52      Respiratory    #Aerosol Therapy / Airway Management: T-Piece, Aerosol Humidity Temp (celsius): 31 Respiration: (!) 23, Pulse Oximetry: 89 %, O2 Daily Delivery Respiratory : T-Piece  Chest Tube Group 1 (A) Left STRAIGHT  28-Tube Status / Drainage: Draining;Small;Serosanguinous, [REMOVED] Chest Tube Group 2 (B) Left STRAIGHT 28-Tube Status / Drainage: Draining;Small;Serosanguinous;Patent, Chest Tube Group 1 (A) Left STRAIGHT  28-Device: Suction 20 cm Water;Air Leak Present, [REMOVED] Chest Tube Group 2 (B) Left STRAIGHT 28-Device: Suction 20 cm Water  Given By:: Trache, Work Of Breathing / Effort: Mild  RUL Breath Sounds: Coarse Crackles, RML Breath Sounds: Diminished;Coarse Crackles, RLL Breath Sounds: Diminished, GENNARO Breath Sounds: Coarse Crackles, LLL Breath Sounds: Diminished    Fluids    Intake/Output Summary (Last 24 hours) at 18 0567  Last data filed at 18 0518   Gross per 24 hour   Intake             2140 ml   Output             2355 ml   Net             -215 ml       Nutrition  Orders  Placed This Encounter   Procedures   • Diet NPO     Standing Status:   Standing     Number of Occurrences:   1     Order Specific Question:   Type:     Answer:   Now [1]     Order Specific Question:   Restrict to:     Answer:   Strict [1]     Physical Exam   Constitutional: He is oriented to person, place, and time. He appears well-developed and well-nourished. No distress.   HENT:   Head: Normocephalic and atraumatic.   Eyes: Conjunctivae are normal.   Neck: No JVD present.   Trach and T piece   Cardiovascular: Normal rate.  Exam reveals no gallop.    No murmur heard.  Pulmonary/Chest: Effort normal. No stridor. No respiratory distress. He has no wheezes. He has rales.   L   Abdominal: Soft. There is no tenderness. There is no rebound and no guarding.   Musculoskeletal: He exhibits edema.   Neurological: He is oriented to person, place, and time.   Skin: Skin is warm and dry. No rash noted. He is not diaphoretic.   Psychiatric: He has a normal mood and affect. Thought content normal.   Nursing note and vitals reviewed.      Recent Labs      01/28/18   0445  01/29/18   0358  01/30/18   0500   WBC  15.9*  15.6*  23.6*   RBC  3.33*  3.58*  3.73*   HEMOGLOBIN  7.3*  8.0*  8.2*   HEMATOCRIT  28.1*  29.9*  30.8*   MCV  84.4  83.5  82.6   MCH  21.9*  22.3*  22.0*   MCHC  26.0*  26.8*  26.6*   RDW  64.7*  64.1*  63.3*   PLATELETCT  293  303  345   MPV  10.2  10.4  10.2     Recent Labs      01/28/18   0445  01/29/18   0358   SODIUM  145  142   POTASSIUM  4.4  4.2   CHLORIDE  111  108   CO2  30  28   GLUCOSE  116*  93   BUN  39*  34*   CREATININE  0.94  0.85   CALCIUM  8.9  8.4*                      Assessment/Plan     Pleural effusion on left   Assessment & Plan    -Now status post left thoracoscopy, partial thoracoscopic decortication, mechanical and chemical pleurodesis  - Patient is improved postoperatively, now with good sats on T-piece  - 1 chest tube has been removed, the other is at water seal        Hypernatremia    Assessment & Plan    - Improved on free water flushes, continue  - Repeat BMP in the morning        Normocytic anemia   Assessment & Plan    - No sign of gross bleeding  - Repeat CBC in the morning          Leukocytosis   Assessment & Plan    - Possibly reactive, stable, repeat CBC in the morning  - Await cultures from surgery, otherwise cultures are pending  - If patient worsens will consider antibiotics        Acute respiratory failure with hypoxia (CMS-HCC)   Assessment & Plan    -Improved postoperatively, now with good sats on T-piece  - Recommendations per intensivist        Atrial flutter with rapid ventricular response (CMS-HCC)   Assessment & Plan    -Improved  - Continue amiodarone and digoxin, obtain digoxin level  - Avoid full dose anticoagulation at this time        Chronic systolic CHF (congestive heart failure), NYHA class 4 (CMS-HCC)- (present on admission)   Assessment & Plan    - Does have some edema, no need for Lasix at this time, last time Lasix was used he had worsening of his kidney function  - Most recent ejection fraction was 27%  - Medical management is able        Type 2 diabetes mellitus (CMS-HCC)- (present on admission)   Assessment & Plan    - Mildly elevated, no need for coverage at this time  -Start insulin sliding scale if needed            Reviewed items::  EKG reviewed, Labs reviewed, Medications reviewed and Radiology images reviewed  DVT prophylaxis pharmacological::  Enoxaparin (Lovenox)  DVT prophylaxis - mechanical:  SCDs  Ulcer Prophylaxis::  Not indicated     Pt is alert and oriented however does not completely understand his clinical situation and the consequences of his decisions.  I do not think he is competent to make medical decisions at this point.

## 2018-01-30 NOTE — PROGRESS NOTES
"Progress Note:  1/30/2018, 7:56 AM    S: No acute events.  Chest tube momentarily disconnected last night per Rn, immediately replaced.  Pt up in chair, feels better    O:  Blood pressure 118/64, pulse 76, temperature 37 °C (98.6 °F), resp. rate (!) 27, height 1.956 m (6' 5.01\"), weight (!) 134.6 kg (296 lb 11.8 oz), SpO2 93 %.    NAD, awake, alert  L chest tube in place, air leak significantly smaller but still present    CXR with ? Small L apical pneumothorax.     A:   Active Hospital Problems    Diagnosis   • Pleural effusion on left [J90]     Priority: High   • Leukocytosis [D72.829]   • Normocytic anemia [D64.9]   • Hypernatremia [E87.0]   • Atrial flutter with rapid ventricular response (CMS-HCC) [I48.92]   • Acute respiratory failure with hypoxia (CMS-HCC) [J96.01]   • Chronic systolic CHF (congestive heart failure), NYHA class 4 (CMS-HCC) [I50.22]   • Type 2 diabetes mellitus (CMS-HCC) [E11.9]         P:   Continue L chest tube to water seal until air leak resolves  Continue aggressive pulmonary toilet and mobilization as is being done  CXR in AM    Chandu Paez M.D.  [facility]  [phone]    "

## 2018-01-30 NOTE — THERAPY
"Speech Language Therapy Evaluation completed to address cognition  Functional Status:  Cognitive-linguistic evaluation completed on this date.  Patient pleasant and agreeable; AA&O x4.  The patient presented with a moderate cognitive impairment with mild deficits in auditory comprehension at the complex conversational level and self monitoring.  Moderate deficits noted in generative naming, attention, safety awareness, verbal sequencing, and insight into deficits.  Severe deficits in serial addition, though, appeared to be more related to attention deficits.  The patient was not able to state the months of the year and omitted September and November until cued.  He perseverated upon being discharged today stating, \"I need to go home today\" despite tracheostomy and chest tube in place.  In a short term memory task, the patient the patient recalled 1/3 words after 5 minutes and 2/3 after 16 minutes.  As the session progressed, the patient began to require increased cues and redirections to tasks.  Therefore, the assessment was discontinued.   Of note, the patient consumed 4 oz of nectars via cup sip during the evaluation.  The patient followed cues to swallowing strategies of reduced bolus size and double swallow.  However, he demonstrated a weak cough following >75% of all sips which may be concerning for penetration/aspiration.  Tracheal suctioning completed at the conclusion of the session with thick white secretions noted.  RT notified.   Recommendations:  The patient would benefit from continued SLP services to address both cognition and dysphagia during the next level of care.  SLP will continue to follow.  Please continue DIRECT 1:1 supervision during all PO intake and provide cues to posted swallowing strategies. Speaking valve to be in place for ALL PO intake.  SLP following closely.    Plan of Care: Will benefit from Speech Therapy 5 times per week  Post-Acute Therapy: Discharge to a transitional care " "facility for continued skilled therapy services.    See \"Rehab Therapy-Acute\" Patient Summary Report for complete documentation.   "

## 2018-01-30 NOTE — PROGRESS NOTES
12 hour chart check  Monitor summary: a flutter for majority of the night but transitioned into sinus   --/.08/--

## 2018-01-30 NOTE — PROGRESS NOTES
Thoracic surgery paged d/t pt chest tube becoming disconnected from atrium connection.  Atrium and tubing replaced.  Incision redressed.  Respiratory status stable. No new orders received.  Will continue to monitor.

## 2018-01-30 NOTE — FLOWSHEET NOTE
01/29/18 1518   Interdisciplinary Plan of Care-Goals (Indications)   Obstructive Ventilatory Defect or Pulmonary Disease without Obvious Obstruction History / Diagnosis   Interdisciplinary Plan of Care-Outcomes    Bronchodilator Outcome Patient at Stable Baseline   SVN Group   #SVN Performed Yes   Given By: Trache   Aerosol Therapy / Airway Management   #Aerosol Therapy / Airway Management T-Piece   Aerosol Humidity Temp (celsius) 31   Respiratory WDL   Respiratory (WDL) X   Chest Exam   Respiration (!) 23   Pulse 83   Heart Rate (Monitored) 75   Breath Sounds   Pre/Post Intervention Pre Intervention Assessment   RUL Breath Sounds Diminished   RML Breath Sounds Diminished   RLL Breath Sounds Diminished   GENNARO Breath Sounds Diminished   LLL Breath Sounds Diminished   Oximetry   Continuous Oximetry Yes   O2 Alarms Set & Reviewed Yes   Oxygen   Pulse Oximetry 93 %   O2 (LPM) 5   O2 (FiO2) 31   O2 Daily Delivery Respiratory  T-Piece

## 2018-01-30 NOTE — PROGRESS NOTES
"Pulmonary Critical Care Progress Note       Chief Complaint:  Respiratory failure    HPI: \"52 y.o. male who presented with recurrent left pleural effusion. He was previously admitted to the hospital Dec 19 with acute heart failure and was found to have multivessel coronary artery disease on cardiac catheterization. He had two vessel coronary artery bypass graft surgery done on Dec 22 with Dr. Ash and developed acute respiratory failure after surgery. He required ventilator support for hypoxia and had a tracheostomy placed on Dec 30. A left side thoracentesis was done Palomo 3 and he did grow MSSA and klebsiella from sputum culture. He did finish rocephin for treatment of this on Jan 12. However, his left side effusion reaccumulated and he underwent thoracentsis two more times. The last thoracentesis was done Jan 21 with 400mL of bloody fluid removed and CT scan of the chest Jan22 showed rapid reaccumulation and again a large effusion. His hemoglobin did remain stable. The case was discussed with nurse practitioner Silvia Cook for Dr. Ash who agreed with transfer to [facility] for surgical evaluation for possible pleurodesis or VATS. \"    Interval Events:  24 hour interval history reviewed  Reason for visit:  Respiratory failure, pleural effusion   -No acute events overnight   - Neuro: AOx4   - HR: A-flutter 60s   - BP: 110s systolic   - GI: Tube feeding at goal, SLP evaluation today   - UOP: Adequate   - Ceballos: yes - out today   - Tm: Afebrile   - Lines: PIV   - PPx: GI Pepcid, DVT Lovenox   - T-piece 5L, 30%   - CXR: Stable parenchymal opacities in the left chest tube, possible small residual left apical pneumothorax    Yesterday   - one chest tube out    Review of Systems   Constitutional: Negative for chills and fever.   Respiratory: Positive for shortness of breath. Negative for cough, sputum production and stridor.    Cardiovascular: Positive for chest pain.   Gastrointestinal: Negative for abdominal " pain, nausea and vomiting.   Genitourinary: Negative for dysuria.   Musculoskeletal: Negative for myalgias.   Skin: Negative for rash.   Neurological: Negative for dizziness, sensory change and focal weakness.   Psychiatric/Behavioral: The patient is nervous/anxious.        Physical Exam   Constitutional: He is oriented to person, place, and time. No distress.   HENT:   Head: Normocephalic and atraumatic.   Right Ear: External ear normal.   Left Ear: External ear normal.   Nose: Nose normal.   Mouth/Throat: Oropharynx is clear and moist. No oropharyngeal exudate.   Eyes: Conjunctivae and EOM are normal. Pupils are equal, round, and reactive to light. Right eye exhibits no discharge. Left eye exhibits no discharge. No scleral icterus.   Neck: Neck supple. No JVD present. No tracheal deviation present.   Trach in place   Cardiovascular: Normal rate.  An irregularly irregular rhythm present.   No murmur heard.  Pulmonary/Chest: No stridor. He has decreased breath sounds in the left lower field. He has no wheezes. He has rales.   1 chest tube on the left   Abdominal: Soft. Bowel sounds are normal. He exhibits no distension. There is no tenderness.   cortrak in place - tolerating enteral TF   Genitourinary: Penis normal.   Musculoskeletal: Normal range of motion.   No clubbing or cyanosis   Neurological: He is alert and oriented to person, place, and time. No cranial nerve deficit. Gait normal.   Confused and difficult to reorient   Skin: Skin is warm and dry. No rash noted. He is not diaphoretic. No erythema. No pallor.   Psychiatric: His affect is inappropriate. He is agitated. He expresses impulsivity.   Nursing note and vitals reviewed.      PFSH:  No change.    Respiratory:     Pulse Oximetry: 91 %  CXR personally reviewed and compared to prior images    HemoDynamics:  Pulse: 76, Heart Rate (Monitored): 74  Blood Pressure: 118/64, NIBP: 108/65       Neuro:    Fluids:  Intake/Output       01/28/18 0700 - 01/29/18  0659 18 07 - 18 0659 18 07 - 18 0659      1900-0659 Total  Total  Total       Intake    P.O.  --  -- --  240  -- 240  --  -- --    P.O. -- -- -- 240 -- 240 -- -- --    Other  120  -- 120  100  -- 100  --  -- --    Medications (P.O./ Enteral Liquids) 120 -- 120 100 -- 100 -- -- --    Enteral  1300  1300 2600  1100  550 1650  --  -- --    Enteral Volume   -- -- --    Free Water / Tube Flush 400 400 800 200 400 600 -- -- --    Total Intake 1420 1300 2720 6146 699 5211 -- -- --       Output    Urine  860  1085 1945  875  1150   --  -- --    Indwelling Cathether 860 1085 2383 393 8714  -- -- --    Drains  140  110 250  110  70 180  --  -- --    Left Chest Tube 2  60 10 70 -- -- -- -- -- --    Left Chest Tube 1 80 100 180 110 70 180 -- -- --    Stool  --  -- --  --  -- --  --  -- --    Number of Times Stooled 1 x -- 1 x 1 x -- 1 x -- -- --    Total Output 1000 1195 2195 985 1220 2205 -- -- --       Net I/O     420 105 525 455 -670 -215 -- -- --        Weight: (!) 134.6 kg (296 lb 11.8 oz)  Recent Labs      18   0500   SODIUM  145  142  139   POTASSIUM  4.4  4.2  4.4   CHLORIDE  111  108  105   CO2  30  28  27   BUN  39*  34*  34*   CREATININE  0.94  0.85  0.87   CALCIUM  8.9  8.4*  8.5       GI/Nutrition:    Liver Function  Recent Labs      188  18   0500   ALTSGPT   --   6   --    ASTSGOT   --   16   --    ALKPHOSPHAT   --   83   --    TBILIRUBIN   --   0.5   --    PREALBUMIN   --   10.0*   --    GLUCOSE  116*  93  129*       Heme:  Recent Labs      18   0445  18   0358  18   0500   RBC  3.33*  3.58*  3.73*   HEMOGLOBIN  7.3*  8.0*  8.2*   HEMATOCRIT  28.1*  29.9*  30.8*   PLATELETCT  293  303  345       Infectious Disease:  Temp  Av.8 °C (98.2 °F)  Min: 36.6 °C (97.9 °F)  Max: 37 °C (98.6 °F)    Recent Labs       01/28/18   0445  01/29/18   0358  01/30/18   0500   WBC  15.9*  15.6*  23.6*   NEUTSPOLYS  77.20*  76.60*  82.60*   LYMPHOCYTES  6.70*  7.40*  1.70*   MONOCYTES  8.70  7.60  2.60   EOSINOPHILS  4.80  5.60  6.10   BASOPHILS  0.70  0.60  0.90   ASTSGOT   --   16   --    ALTSGPT   --   6   --    ALKPHOSPHAT   --   83   --    TBILIRUBIN   --   0.5   --      Current Facility-Administered Medications   Medication Dose Frequency Provider Last Rate Last Dose   • Valproate Sodium (DEPAKENE) oral solution 250 mg  250 mg Q8HRS Silvio Hernandez D.O.   250 mg at 01/30/18 0456   • diltiazem (CARDIZEM) injection 10 mg  10 mg Q4HRS PRN Silvio Hernandez D.O.       • enoxaparin (LOVENOX) inj 40 mg  40 mg DAILY Chandu Justice M.D.   40 mg at 01/29/18 0807   • acetaminophen (TYLENOL) tablet 650 mg  650 mg Q6HRS PRN Linnette Espinoza M.D.   650 mg at 01/29/18 0752   • ondansetron (ZOFRAN) syringe/vial injection 4 mg  4 mg Q4HRS PRN Linnette Espinoza M.D.       • ondansetron (ZOFRAN ODT) dispertab 4 mg  4 mg Q4HRS PRN Linnette Espinoza M.D.       • promethazine (PHENERGAN) tablet 12.5-25 mg  12.5-25 mg Q4HRS PRN Linnette Espinoza M.D.       • promethazine (PHENERGAN) suppository 12.5-25 mg  12.5-25 mg Q4HRS PRN Linnette Espinoza M.D.       • prochlorperazine (COMPAZINE) injection 5-10 mg  5-10 mg Q4HRS PRN Linnette Espinoza M.D.       • amiodarone (CORDARONE) tablet 200 mg  200 mg Q DAY Linnette Espinoza M.D.   200 mg at 01/29/18 0807   • aspirin (ASA) chewable tab 81 mg  81 mg DAILY Linnette Espinoza M.D.   81 mg at 01/29/18 0802   • atorvastatin (LIPITOR) tablet 40 mg  40 mg QHS Linnette Epsinoza M.D.   40 mg at 01/29/18 2103   • digoxin (LANOXIN) tablet 125 mcg  125 mcg DAILY AT 1800 Linnette Espinoza M.D.   125 mcg at 01/29/18 1905   • miconazole 2%-zinc oxide (MILTON) topical cream   Q6HRS PRN Linnette Espinoza M.D.       • nystatin (MYCOSTATIN) 573906 UNIT/ML suspension 500,000 Units  5 mL 4X/DAY Linnette Espinoza M.D.   500,000 Units at 01/29/18  2105   • polyethylene glycol/lytes (MIRALAX) PACKET 1 Packet  1 Packet DAILY Linnette Espinoza M.D.   Stopped at 01/29/18 0900   • quetiapine (SEROQUEL) tablet 50 mg  50 mg Q EVENING Linnette Espinoza M.D.   50 mg at 01/29/18 2103   • temazepam (RESTORIL) capsule 15 mg  15 mg QHS PRN Linnette Espinoza M.D.   15 mg at 01/26/18 2047   • spironolactone (ALDACTONE) tablet 50 mg  50 mg Q DAY Linnette Espinoza M.D.   Stopped at 01/29/18 0900   • lisinopril (PRINIVIL) 10 MG tablet 10 mg  10 mg Q DAY Linnette Espinoza M.D.   Stopped at 01/29/18 0900   • alprazolam (XANAX) tablet 0.5 mg  0.5 mg Q6HRS PRN Linnette Espinoza M.D.   0.5 mg at 01/27/18 0130   • haloperidol lactate (HALDOL) injection 2-5 mg  2-5 mg Q4HRS PRN Linnette Espinoza M.D.   5 mg at 01/24/18 2328   • morphine (pf) 4 mg/ml injection 2 mg  2 mg Q4HRS PRN Linnette Espinoza M.D.   2 mg at 01/29/18 2109   • nitroglycerin (NITROSTAT) tablet 0.4 mg  0.4 mg Q5 MIN PRN Linnette Espinoza M.D.       • oxycodone immediate-release (ROXICODONE) tablet 5 mg  5 mg Q4HRS PRN Linnette Espinoza M.D.   5 mg at 01/30/18 0339   • Respiratory Care per Protocol   Continuous RT Chandu Justice M.D.       • senna-docusate (PERICOLACE or SENOKOT S) 8.6-50 MG per tablet 2 Tab  2 Tab BID Chandu Justice M.D.   2 Tab at 01/29/18 2103    And   • polyethylene glycol/lytes (MIRALAX) PACKET 1 Packet  1 Packet QDAY PRN Chandu Justice M.D.        And   • magnesium hydroxide (MILK OF MAGNESIA) suspension 30 mL  30 mL QDAY PRN Chandu Justice M.D.        And   • bisacodyl (DULCOLAX) suppository 10 mg  10 mg QDAY PRN Chandu Justice M.D.       • chlorhexidine (PERIDEX) 0.12 % solution 15 mL  15 mL BID Chandu Jutsice M.D.   15 mL at 01/29/18 2103   • lidocaine (XYLOCAINE) 1%  injection  1-2 mL Q30 MIN PRN Chandu Justice M.D.       • ipratropium-albuterol (DUONEB) nebulizer solution 3 mL  3 mL Q2HRS PRN (RT) Chandu Justice M.D.       • ipratropium-albuterol  (DUONEB) nebulizer solution 3 mL  3 mL Q4HRS (RT) Chandu Justice M.D.   3 mL at 01/30/18 0611   • famotidine (PEPCID) tablet 20 mg  20 mg Q12HRS Chandu Justice M.D.   20 mg at 01/29/18 2103     Last reviewed on 1/25/2018 12:01 PM by Mack Calderon R.N.    Quality  Measures:  Labs reviewed, Medications reviewed and Radiology images reviewed  Ceballos catheter: Critically Ill - Requiring Accurate Measurement of Urinary Output      DVT Prophylaxis: Enoxaparin (Lovenox)  DVT prophylaxis - mechanical: SCDs  Ulcer prophylaxis: Yes  Antibiotics: Treating active infection/contamination beyond 24 hours perioperative coverage      Assessment and Plan:  Acute hypoxemic respiratory failure   - RT/O2 Protocols   - Titrate supplemental FiO2 to maintain SpO2 >88%    S/P VDRF   - T-piece as tolerated   - may place on ventilator for any respiratory fatigue    Recurrent left pleural effusion   - organized hemothorax   - VATS with decortication and pleurodesis on 1/25   - Chest tube continues to have air leak    COPD    - no acute exacerbation   - cont bronchodilators    S/P 2V CABG - 12/22   - cont ASA, statin    DM type 2   - follow glucose    Paroxysmal atrial fibrillation/atrial flutter   - rate control   - cont prophylactic Lovenox   - continue amiodarone, digoxin    Acute systolic heart failure   - EF 25-30%   - cont current doses of digoxin, lisinopril and aldactone    NSTEMI in December 2017   - cont ASA, statin    Leukocytosis   - Afebrile, no other signs of infection   - continue to monitor, low threshold to begin antimicrobial therapy    Hypernatremia   - resolved   - cont free water    Stable to transfer patient out of ICU today. [facility] will sign off. Please call with questions.    Discussed with RN, RT, Team, Clinical pharmacist, Hospitalist

## 2018-01-30 NOTE — PROGRESS NOTES
"Pulmonary/Critical Care Medicine   Progress Note    Date of service: 1/30/2018  Time: 10:49 AM    Called to the bedside by nurse for patient and son demanding that the patient be allowed to leave, they were both informed multiple times that due to the patient's respiratory status and chest tube with persistent air leak and small left apical pneumothorax he was not medically stable to be discharged at this time. The patient's son states being in the hospital is making him mentally unstable, he was offered the availability to ambulate outside with RN and telemetry. However the patient stated he is leaving the hospital immediately if allowed outside.    The patient's son became more irate and aggressive, demanding to see \"administrators\" and stated \"it's for your safety and your staff's safety to let him go\".    Patient has had a cognitive evaluation by speech and language pathology which revealed significant cognitive deficits. At this time he does not have the capacity to make medical decisions. His wife (POA) and security will be contacted.  "

## 2018-01-30 NOTE — CARE PLAN
Problem: Oxygenation:  Goal: Maintain adequate oxygenation dependent on patient condition  Outcome: PROGRESSING AS EXPECTED  Pt. Progressing towards decannulation

## 2018-01-31 ENCOUNTER — APPOINTMENT (OUTPATIENT)
Dept: RADIOLOGY | Facility: MEDICAL CENTER | Age: 53
DRG: 163 | End: 2018-01-31
Attending: INTERNAL MEDICINE
Payer: MEDICARE

## 2018-01-31 PROCEDURE — 700102 HCHG RX REV CODE 250 W/ 637 OVERRIDE(OP): Performed by: INTERNAL MEDICINE

## 2018-01-31 PROCEDURE — 71045 X-RAY EXAM CHEST 1 VIEW: CPT

## 2018-01-31 PROCEDURE — 94640 AIRWAY INHALATION TREATMENT: CPT

## 2018-01-31 PROCEDURE — 700111 HCHG RX REV CODE 636 W/ 250 OVERRIDE (IP): Performed by: INTERNAL MEDICINE

## 2018-01-31 PROCEDURE — 770020 HCHG ROOM/CARE - TELE (206)

## 2018-01-31 PROCEDURE — A9270 NON-COVERED ITEM OR SERVICE: HCPCS | Performed by: INTERNAL MEDICINE

## 2018-01-31 PROCEDURE — 99231 SBSQ HOSP IP/OBS SF/LOW 25: CPT | Performed by: HOSPITALIST

## 2018-01-31 PROCEDURE — 700101 HCHG RX REV CODE 250: Performed by: INTERNAL MEDICINE

## 2018-01-31 PROCEDURE — 97116 GAIT TRAINING THERAPY: CPT

## 2018-01-31 PROCEDURE — 97530 THERAPEUTIC ACTIVITIES: CPT

## 2018-01-31 RX ADMIN — MORPHINE SULFATE 2 MG: 4 INJECTION INTRAVENOUS at 03:02

## 2018-01-31 RX ADMIN — MORPHINE SULFATE 2 MG: 4 INJECTION INTRAVENOUS at 20:34

## 2018-01-31 RX ADMIN — CHLORHEXIDINE GLUCONATE 15 ML: 1.2 RINSE ORAL at 08:31

## 2018-01-31 RX ADMIN — VALPROIC ACID 250 MG: 250 SOLUTION ORAL at 20:34

## 2018-01-31 RX ADMIN — ENOXAPARIN SODIUM 40 MG: 100 INJECTION SUBCUTANEOUS at 08:34

## 2018-01-31 RX ADMIN — DIGOXIN 125 MCG: 125 TABLET ORAL at 17:46

## 2018-01-31 RX ADMIN — SPIRONOLACTONE 50 MG: 25 TABLET, FILM COATED ORAL at 08:33

## 2018-01-31 RX ADMIN — IPRATROPIUM BROMIDE AND ALBUTEROL SULFATE 3 ML: .5; 3 SOLUTION RESPIRATORY (INHALATION) at 15:14

## 2018-01-31 RX ADMIN — NYSTATIN 500000 UNITS: 100000 SUSPENSION ORAL at 20:33

## 2018-01-31 RX ADMIN — ATORVASTATIN CALCIUM 40 MG: 40 TABLET, FILM COATED ORAL at 20:33

## 2018-01-31 RX ADMIN — VALPROIC ACID 250 MG: 250 SOLUTION ORAL at 05:00

## 2018-01-31 RX ADMIN — LISINOPRIL 10 MG: 10 TABLET ORAL at 08:31

## 2018-01-31 RX ADMIN — QUETIAPINE FUMARATE 50 MG: 25 TABLET ORAL at 20:33

## 2018-01-31 RX ADMIN — IPRATROPIUM BROMIDE AND ALBUTEROL SULFATE 3 ML: .5; 3 SOLUTION RESPIRATORY (INHALATION) at 11:45

## 2018-01-31 RX ADMIN — NYSTATIN 500000 UNITS: 100000 SUSPENSION ORAL at 08:31

## 2018-01-31 RX ADMIN — FAMOTIDINE 20 MG: 20 TABLET, FILM COATED ORAL at 08:31

## 2018-01-31 RX ADMIN — IPRATROPIUM BROMIDE AND ALBUTEROL SULFATE 3 ML: .5; 3 SOLUTION RESPIRATORY (INHALATION) at 06:46

## 2018-01-31 RX ADMIN — CHLORHEXIDINE GLUCONATE 15 ML: 1.2 RINSE ORAL at 20:33

## 2018-01-31 RX ADMIN — IPRATROPIUM BROMIDE AND ALBUTEROL SULFATE 3 ML: .5; 3 SOLUTION RESPIRATORY (INHALATION) at 22:06

## 2018-01-31 RX ADMIN — NYSTATIN 500000 UNITS: 100000 SUSPENSION ORAL at 17:46

## 2018-01-31 RX ADMIN — ASPIRIN 81 MG: 81 TABLET, CHEWABLE ORAL at 08:31

## 2018-01-31 RX ADMIN — VALPROIC ACID 250 MG: 250 SOLUTION ORAL at 15:05

## 2018-01-31 RX ADMIN — AMIODARONE HYDROCHLORIDE 200 MG: 200 TABLET ORAL at 08:31

## 2018-01-31 RX ADMIN — IPRATROPIUM BROMIDE AND ALBUTEROL SULFATE 3 ML: .5; 3 SOLUTION RESPIRATORY (INHALATION) at 02:11

## 2018-01-31 RX ADMIN — FAMOTIDINE 20 MG: 20 TABLET, FILM COATED ORAL at 20:33

## 2018-01-31 RX ADMIN — IPRATROPIUM BROMIDE AND ALBUTEROL SULFATE 3 ML: .5; 3 SOLUTION RESPIRATORY (INHALATION) at 18:30

## 2018-01-31 ASSESSMENT — GAIT ASSESSMENTS
GAIT LEVEL OF ASSIST: STAND BY ASSIST
ASSISTIVE DEVICE: FRONT WHEEL WALKER
DISTANCE (FEET): 25

## 2018-01-31 ASSESSMENT — PAIN SCALES - GENERAL
PAINLEVEL_OUTOF10: 9
PAINLEVEL_OUTOF10: 0
PAINLEVEL_OUTOF10: 7
PAINLEVEL_OUTOF10: 0
PAINLEVEL_OUTOF10: 2

## 2018-01-31 ASSESSMENT — COGNITIVE AND FUNCTIONAL STATUS - GENERAL
CLIMB 3 TO 5 STEPS WITH RAILING: A LITTLE
MOVING TO AND FROM BED TO CHAIR: A LITTLE
STANDING UP FROM CHAIR USING ARMS: A LITTLE
SUGGESTED CMS G CODE MODIFIER MOBILITY: CK
WALKING IN HOSPITAL ROOM: A LITTLE
MOBILITY SCORE: 17
TURNING FROM BACK TO SIDE WHILE IN FLAT BAD: UNABLE

## 2018-01-31 NOTE — PROGRESS NOTES
Dr. Paez notified during AM rounding of patient's desire to leave the hospital.  Pt educated by MD on why he is not medically stable to leave the hospital at this time.

## 2018-01-31 NOTE — PROGRESS NOTES
"Pt and family member became verbally aggressive threatening \"if the patient is not allowed to leave, your physical and emotional health will be in danger\".  Pt threatening to pull out chest tube and leave hospital.  Dr. Evans notified and to bedside to discuss situation and current medical status with patient.  Charge nurse and supervisor notified and to bedside as well. Security called.  "

## 2018-01-31 NOTE — PROGRESS NOTES
[facility] Hospitalist Progress Note    Date of Service: 2018    Chief Complaint  52 y.o. male admitted 2018 with recurrent L pleural effusion.  Recent admit  for CHF exacerbation and found to have multivessl CAD.  S/p CABG x2 vessel on that admission.  Hospital stay complicated by HCAP with MSSA and Klebsiella from sutum.  Had recurrent L side effusion requiring tap on 3 prior occasions last     Interval Problem Update  Refused am labs  disconected himself from chest tube  AFlttr in 60's  AFebrile  TF's to goal  Ambulating  5 litres O2 T piece    Ceballos out        Consultants/Specialty  Pulmnology  CT Surgery    Disposition  Cont in ICU        ROS     Physical Exam  Laboratory/Imaging   Hemodynamics  Temp (24hrs), Av.1 °C (98.8 °F), Min:37 °C (98.6 °F), Max:37.2 °C (99 °F)   Temperature: 37 °C (98.6 °F)  Pulse  Av.2  Min: 40  Max: 168 Heart Rate (Monitored): 63  Blood Pressure: 111/60, NIBP: 110/52      Respiratory    #Aerosol Therapy / Airway Management: T-Piece, Aerosol Humidity Temp (celsius): 31 Respiration: 17, Pulse Oximetry: 93 %, O2 Daily Delivery Respiratory : T-Piece  Chest Tube Group 1 (A) Left STRAIGHT  28-Tube Status / Drainage: Draining;Small;Serosanguinous, Chest Tube Group 1 (A) Left STRAIGHT  28-Device:  (clamped tube at chest)  Given By:: Trache, Work Of Breathing / Effort: Mild  RUL Breath Sounds: Coarse Crackles, RML Breath Sounds: Diminished;Coarse Crackles, RLL Breath Sounds: Diminished, GENNARO Breath Sounds: Coarse Crackles, LLL Breath Sounds: Diminished    Fluids    Intake/Output Summary (Last 24 hours) at 18 0571  Last data filed at 18 0457   Gross per 24 hour   Intake             2090 ml   Output             1050 ml   Net             1040 ml       Nutrition  Orders Placed This Encounter   Procedures   • Diet NPO     Standing Status:   Standing     Number of Occurrences:   1     Order Specific Question:   Type:     Answer:   Now [1]     Order Specific  Question:   Restrict to:     Answer:   Strict [1]     Physical Exam   Constitutional: He is oriented to person, place, and time. He appears well-developed and well-nourished. No distress.   HENT:   Head: Normocephalic and atraumatic.   Eyes: Conjunctivae are normal. No scleral icterus.   Neck: No JVD present.   Trach and T piece   Cardiovascular: Normal rate.  Exam reveals no gallop.    No murmur heard.  Pulmonary/Chest: Effort normal. No stridor. No respiratory distress. He has no wheezes. He has rales.   L   Abdominal: Soft. There is no tenderness. There is no rebound and no guarding.   Musculoskeletal: He exhibits edema.   Neurological: He is oriented to person, place, and time.   Skin: Skin is warm and dry. No rash noted. He is not diaphoretic.   Psychiatric: He has a normal mood and affect. Thought content normal.   Nursing note and vitals reviewed.      Recent Labs      01/29/18   0358  01/30/18   0500   WBC  15.6*  23.6*   RBC  3.58*  3.73*   HEMOGLOBIN  8.0*  8.2*   HEMATOCRIT  29.9*  30.8*   MCV  83.5  82.6   MCH  22.3*  22.0*   MCHC  26.8*  26.6*   RDW  64.1*  63.3*   PLATELETCT  303  345   MPV  10.4  10.2     Recent Labs      01/29/18   0358  01/30/18   0500   SODIUM  142  139   POTASSIUM  4.2  4.4   CHLORIDE  108  105   CO2  28  27   GLUCOSE  93  129*   BUN  34*  34*   CREATININE  0.85  0.87   CALCIUM  8.4*  8.5                      Assessment/Plan     Pleural effusion on left   Assessment & Plan    -Now status post left thoracoscopy, partial thoracoscopic decortication, mechanical and chemical pleurodesis  - Patient is improved postoperatively, now with good sats on T-piece  - 1 chest tube has been removed, the other is at water seal  -Surgery Dr Paez following        Hypernatremia   Assessment & Plan    - Improved on free water flushes, continue  - Repeat BMP in the morning        Normocytic anemia   Assessment & Plan    - No sign of gross bleeding  - Repeat CBC in the morning          Leukocytosis    Assessment & Plan    - Possibly reactive, stable, repeat CBC in the morning  - Await cultures from surgery, otherwise cultures are pending  - If patient worsens will consider antibiotics        Acute respiratory failure with hypoxia (CMS-HCC)   Assessment & Plan    -Improved postoperatively, now with good sats on T-piece  - Recommendations per intensivist        Atrial flutter with rapid ventricular response (CMS-HCC)   Assessment & Plan    -Improved  - Continue amiodarone and digoxin, obtain digoxin level  - Avoid full dose anticoagulation at this time        Chronic systolic CHF (congestive heart failure), NYHA class 4 (CMS-HCC)- (present on admission)   Assessment & Plan    - Does have some edema, no need for Lasix at this time, last time Lasix was used he had worsening of his kidney function  - Most recent ejection fraction was 27%  - Medical management is able        Type 2 diabetes mellitus (CMS-HCC)- (present on admission)   Assessment & Plan    - Mildly elevated, no need for coverage at this time  -Start insulin sliding scale if needed            Reviewed items::  EKG reviewed, Labs reviewed, Medications reviewed and Radiology images reviewed  DVT prophylaxis pharmacological::  Enoxaparin (Lovenox)  DVT prophylaxis - mechanical:  SCDs  Ulcer Prophylaxis::  Not indicated     Pt is alert and oriented however does not completely understand his clinical situation and the consequences of his decisions.  I do not think he is competent to make medical decisions at this point.

## 2018-01-31 NOTE — PROGRESS NOTES
Patient refused to have labs drawn this morning. Patient educated on the importance of daily labs but still refused to have labs drawn at this time.

## 2018-01-31 NOTE — PROGRESS NOTES
"At 0220 respiratory therapist noticed pt's chest tube on the floor and notified nursing. Upon enter the room, I clamped the portion of the chest tube that remained in his chest and ordered a STAT chest xray. Dr. Lyons was notified of events and that the patient was stable.  Chest tube was put to water seal at this time. Dr. Paez was also notified of events and results of the xray with no further orders at this time. Patient asked to call his wife and stated \"I am sorry, I turned over and pulled out my chest tube. I did not mean to.\" Pt currently stable on speaking valve at this time.   "

## 2018-01-31 NOTE — CARE PLAN
Problem: Oxygenation:  Goal: Maintain adequate oxygenation dependent on patient condition  Outcome: PROGRESSING AS EXPECTED  Tolerating t-piece well. Progress to decannulation.

## 2018-01-31 NOTE — DIETARY
"Nutrition Services: Weekly TF update    Admit day 8.  TF @ goal via duodenal Cortrak (nasal bridle in place): Peptamen Intense VHP @ 75 ml/hr, providing 1800 kcal, 168 grams protein, and 1512 ml free water per day.    Height: 77\", Weight: 133.4 kg, BMI= 34.87. Weight trending down slowly with hypocaloric feeding and diuresis (-3.3 L fluid per I/O).   IBW: 208 lb (94.5 kg)     Labs: Pre-albumin 10.0, CRP 4.25  Meds: Bowel meds, aldactone  Fluids: 200 ml free water Q 6 hours  GI: Last BM 1/29     Estimated Needs: REE per MSJ = 2294 kcal/day (65-70% = 9326-1891 kcal/day)  Calories/day: 1467 - 1868 (11 - 14 kcal/kg)  Grams protein/day: 189+ (2.0+ grams/kg IBW) (1.0-1.2 grams/kg = 133-160 grams/day)        Assessment / Evaluation:  Pt tolerating T-piece well. +PMSV.  Current TF regimen remains appropriate.     Plan / Recommendation:  Continue current TF regimen.  Free water flushes per MD.     RD following.   "

## 2018-01-31 NOTE — PROGRESS NOTES
Pt wife at bedside and she attempted to get pt up to the side of the bed without assistance. Upon witnessing this I went into the room and assisted pt to sitting on the edge of the bed. Patient and wife educated on safety of getting up only with the help of staff. Patient then threatened to pull out his chest tube and leave.  Patient and wife educated on the importance of the chest tube and his medical status. Patient's wife stated that if we do not do what the patient wants he would hurt the staff and that he has done it before. Patient put back into bed to rest at this time.

## 2018-01-31 NOTE — CARE PLAN
Problem: Skin Integrity  Goal: Risk for impaired skin integrity will decrease  Outcome: PROGRESSING AS EXPECTED  Skin assessed at start of shift. Nutrition, moisture, sensory, activity, mobility, friction and shear assessed and addressed with patient and family. Kirit documented.     Problem: Pain Management  Goal: Pain level will decrease to patient's comfort goal  Outcome: PROGRESSING AS EXPECTED  Pain assessed and documented per unit protocol and appropriate pharmacological and non-pharmacological interventions implemented. Reviewed pain goals with patient and family.

## 2018-01-31 NOTE — FLOWSHEET NOTE
01/30/18 1402   Interdisciplinary Plan of Care-Goals (Indications)   Obstructive Ventilatory Defect or Pulmonary Disease without Obvious Obstruction History / Diagnosis   Interdisciplinary Plan of Care-Outcomes    Bronchodilator Outcome Patient at Stable Baseline   SVN Group   #SVN Performed Yes   Given By: Trache   Aerosol Therapy / Airway Management   #Aerosol Therapy / Airway Management T-Piece   Aerosol Humidity Temp (celsius) 31   Respiratory WDL   Respiratory (WDL) X   Chest Exam   Pulse 82   Breath Sounds   Pre/Post Intervention Pre Intervention Assessment   RUL Breath Sounds Diminished   RML Breath Sounds Diminished   RLL Breath Sounds Diminished   GENNARO Breath Sounds Diminished   LLL Breath Sounds Diminished   Oximetry   Continuous Oximetry Yes   O2 Alarms Set & Reviewed Yes   Oxygen   Pulse Oximetry 95 %   O2 (LPM) 5   O2 (FiO2) 30   O2 Daily Delivery Respiratory  T-Piece

## 2018-01-31 NOTE — PROGRESS NOTES
"Patients family in room with family dog. Patient given thickened juice per speech evaluation orders. Patient upset about not being able to eat again. Patient claims charge RN told him we were going to get food for him ans his family. Patient was informed I was not present for this conversation but I would be sure to clarify. Charge RN stated she had told patient that his whole family was welcome to come in and eat dinner in the room. The patient must have misunderstood. I informed the patient that would could not provide food for his family and he again became very verbally aggressive stating he was \"tired of this shit\" and that the [facility] employees were liars and kept failing to keep promises. He again threatened to rip out chest tubes and leave the hospital. [facility] manager notified. Patient recovery notified. In an attempt to meet the patient half way I offered to cook each family member a lean cuisine. He responded with \"fuck that shit.\" Patients family was appropriate at this time and said no thank you for the food. I left to discuss with charge RN and returned to room to find patient drinking Iced Tea which was given to the patient by family members. (Family members were previously educated and verbally demonstrated they understood patients diet and why it was in place.) When I asked patient why he was drinking tea, and re-explained his diet order, and that we did not want him to aspirate he through the lid for tea across the room and again started yelling. Charge, supervisor, manager and Dr. Cristina salmeron. Received orders for sitter for patients safety.  "

## 2018-01-31 NOTE — THERAPY
"Physical Therapy Treatment completed.   Bed Mobility:  Supine to Sit: Minimal Assist  Transfers: Sit to Stand: Stand by Assist  Gait: Level Of Assist: Stand by Assist with Front-Wheel Walker       Plan of Care: Will benefit from Physical Therapy 3 times per week  Discharge Recommendations: Equipment: Will Continue to Assess for Equipment Needs.       See \"Rehab Therapy-Acute\" Patient Summary Report for complete documentation.       "

## 2018-01-31 NOTE — PROGRESS NOTES
"Progress Note:  1/31/2018, 2:04 PM    S: chest tube disconnected again when patient was reportedly thrashing around in bed.  Reconnected to water seal, CXR shows no pneumothorax.      O:  Blood pressure 111/60, pulse 65, temperature 37 °C (98.6 °F), resp. rate (!) 24, height 1.956 m (6' 5\"), weight (!) 133.4 kg (294 lb 1.5 oz), SpO2 93 %.    NAD, awake and alert  Still has small air leak with cough, continues to improve      A:   Active Hospital Problems    Diagnosis   • Pleural effusion on left [J90]     Priority: High   • Leukocytosis [D72.829]   • Normocytic anemia [D64.9]   • Hypernatremia [E87.0]   • Atrial flutter with rapid ventricular response (CMS-HCC) [I48.92]   • Acute respiratory failure with hypoxia (CMS-HCC) [J96.01]   • Chronic systolic CHF (congestive heart failure), NYHA class 4 (CMS-HCC) [I50.22]   • Type 2 diabetes mellitus (CMS-HCC) [E11.9]         P:   -Chest tube to water seal until air leak resolves  -If meets criteria, one option would be to go to [facility], and I can continue to follow along for tube removal.  I will not follow him at [facility] in [place].  -Continue pulmonary hygiene  -please call me with questions or concerns.    Chandu Paez M.D.  [facility]  [phone]    "

## 2018-02-01 ENCOUNTER — HOME HEALTH ADMISSION (OUTPATIENT)
Dept: HOME HEALTH SERVICES | Facility: HOME HEALTHCARE | Age: 53
End: 2018-02-01
Payer: MEDICARE

## 2018-02-01 ENCOUNTER — APPOINTMENT (OUTPATIENT)
Dept: RADIOLOGY | Facility: MEDICAL CENTER | Age: 53
DRG: 163 | End: 2018-02-01
Attending: INTERNAL MEDICINE
Payer: MEDICARE

## 2018-02-01 ENCOUNTER — PATIENT OUTREACH (OUTPATIENT)
Dept: HEALTH INFORMATION MANAGEMENT | Facility: OTHER | Age: 53
End: 2018-02-01

## 2018-02-01 VITALS
TEMPERATURE: 98.1 F | SYSTOLIC BLOOD PRESSURE: 111 MMHG | BODY MASS INDEX: 34.65 KG/M2 | WEIGHT: 293.43 LBS | HEART RATE: 64 BPM | RESPIRATION RATE: 20 BRPM | HEIGHT: 77 IN | DIASTOLIC BLOOD PRESSURE: 60 MMHG | OXYGEN SATURATION: 94 %

## 2018-02-01 LAB
ANION GAP SERPL CALC-SCNC: 8 MMOL/L (ref 0–11.9)
BASOPHILS # BLD AUTO: 0.6 % (ref 0–1.8)
BASOPHILS # BLD: 0.1 K/UL (ref 0–0.12)
BUN SERPL-MCNC: 32 MG/DL (ref 8–22)
CALCIUM SERPL-MCNC: 8.4 MG/DL (ref 8.5–10.5)
CHLORIDE SERPL-SCNC: 104 MMOL/L (ref 96–112)
CO2 SERPL-SCNC: 23 MMOL/L (ref 20–33)
CREAT SERPL-MCNC: 0.86 MG/DL (ref 0.5–1.4)
EOSINOPHIL # BLD AUTO: 0.71 K/UL (ref 0–0.51)
EOSINOPHIL NFR BLD: 4 % (ref 0–6.9)
ERYTHROCYTE [DISTWIDTH] IN BLOOD BY AUTOMATED COUNT: 60.6 FL (ref 35.9–50)
GLUCOSE SERPL-MCNC: 101 MG/DL (ref 65–99)
HCT VFR BLD AUTO: 27.3 % (ref 42–52)
HGB BLD-MCNC: 7.3 G/DL (ref 14–18)
IMM GRANULOCYTES # BLD AUTO: 0.46 K/UL (ref 0–0.11)
IMM GRANULOCYTES NFR BLD AUTO: 2.6 % (ref 0–0.9)
LYMPHOCYTES # BLD AUTO: 1.29 K/UL (ref 1–4.8)
LYMPHOCYTES NFR BLD: 7.2 % (ref 22–41)
MCH RBC QN AUTO: 21.5 PG (ref 27–33)
MCHC RBC AUTO-ENTMCNC: 26.7 G/DL (ref 33.7–35.3)
MCV RBC AUTO: 80.3 FL (ref 81.4–97.8)
MONOCYTES # BLD AUTO: 1.41 K/UL (ref 0–0.85)
MONOCYTES NFR BLD AUTO: 7.9 % (ref 0–13.4)
NEUTROPHILS # BLD AUTO: 13.87 K/UL (ref 1.82–7.42)
NEUTROPHILS NFR BLD: 77.7 % (ref 44–72)
NRBC # BLD AUTO: 0 K/UL
NRBC BLD-RTO: 0 /100 WBC
PLATELET # BLD AUTO: 370 K/UL (ref 164–446)
PMV BLD AUTO: 10.8 FL (ref 9–12.9)
POTASSIUM SERPL-SCNC: 5.5 MMOL/L (ref 3.6–5.5)
RBC # BLD AUTO: 3.4 M/UL (ref 4.7–6.1)
SODIUM SERPL-SCNC: 135 MMOL/L (ref 135–145)
WBC # BLD AUTO: 17.8 K/UL (ref 4.8–10.8)

## 2018-02-01 PROCEDURE — A9270 NON-COVERED ITEM OR SERVICE: HCPCS | Performed by: INTERNAL MEDICINE

## 2018-02-01 PROCEDURE — 71045 X-RAY EXAM CHEST 1 VIEW: CPT

## 2018-02-01 PROCEDURE — 700101 HCHG RX REV CODE 250: Performed by: INTERNAL MEDICINE

## 2018-02-01 PROCEDURE — 94640 AIRWAY INHALATION TREATMENT: CPT

## 2018-02-01 PROCEDURE — 700111 HCHG RX REV CODE 636 W/ 250 OVERRIDE (IP): Performed by: INTERNAL MEDICINE

## 2018-02-01 PROCEDURE — 80048 BASIC METABOLIC PNL TOTAL CA: CPT

## 2018-02-01 PROCEDURE — 700102 HCHG RX REV CODE 250 W/ 637 OVERRIDE(OP): Performed by: INTERNAL MEDICINE

## 2018-02-01 PROCEDURE — 97530 THERAPEUTIC ACTIVITIES: CPT

## 2018-02-01 PROCEDURE — 97535 SELF CARE MNGMENT TRAINING: CPT

## 2018-02-01 PROCEDURE — 99239 HOSP IP/OBS DSCHRG MGMT >30: CPT | Performed by: HOSPITALIST

## 2018-02-01 PROCEDURE — 92526 ORAL FUNCTION THERAPY: CPT

## 2018-02-01 PROCEDURE — 85025 COMPLETE CBC W/AUTO DIFF WBC: CPT

## 2018-02-01 RX ORDER — FUROSEMIDE 40 MG/1
40 TABLET ORAL DAILY
Qty: 30 TAB | Refills: 0 | Status: ON HOLD | OUTPATIENT
Start: 2018-02-01 | End: 2018-02-08

## 2018-02-01 RX ORDER — LISINOPRIL 10 MG/1
10 TABLET ORAL DAILY
Qty: 30 TAB | Refills: 0 | Status: SHIPPED | OUTPATIENT
Start: 2018-02-02 | End: 2018-02-06

## 2018-02-01 RX ORDER — POTASSIUM CHLORIDE 1.5 G/1.58G
20 POWDER, FOR SOLUTION ORAL 2 TIMES DAILY
Qty: 30 PACKET | Refills: 3 | Status: SHIPPED | OUTPATIENT
Start: 2018-02-01 | End: 2018-02-01

## 2018-02-01 RX ORDER — POTASSIUM CHLORIDE 20 MEQ/1
20 TABLET, EXTENDED RELEASE ORAL 2 TIMES DAILY
Qty: 60 TAB | Refills: 0 | Status: SHIPPED | OUTPATIENT
Start: 2018-02-01 | End: 2018-02-01

## 2018-02-01 RX ORDER — WARFARIN SODIUM 5 MG/1
5 TABLET ORAL DAILY
Qty: 30 TAB | Refills: 0 | Status: SHIPPED | OUTPATIENT
Start: 2018-02-01 | End: 2018-03-01 | Stop reason: SDUPTHER

## 2018-02-01 RX ORDER — IPRATROPIUM BROMIDE AND ALBUTEROL SULFATE 2.5; .5 MG/3ML; MG/3ML
3 SOLUTION RESPIRATORY (INHALATION) EVERY 4 HOURS PRN
Qty: 30 BULLET | Refills: 0 | Status: SHIPPED | OUTPATIENT
Start: 2018-02-01 | End: 2018-02-12 | Stop reason: SDUPTHER

## 2018-02-01 RX ORDER — ROSUVASTATIN CALCIUM 40 MG/1
40 TABLET, COATED ORAL EVERY EVENING
Qty: 30 TAB | Refills: 0 | Status: SHIPPED | OUTPATIENT
Start: 2018-02-01 | End: 2018-03-01 | Stop reason: SDUPTHER

## 2018-02-01 RX ORDER — CAPTOPRIL 25 MG/1
25 TABLET ORAL EVERY 8 HOURS
Qty: 90 TAB | Refills: 0 | Status: SHIPPED | OUTPATIENT
Start: 2018-02-01 | End: 2018-02-01

## 2018-02-01 RX ORDER — ALBUTEROL SULFATE 90 UG/1
2 AEROSOL, METERED RESPIRATORY (INHALATION) EVERY 6 HOURS PRN
Qty: 1 INHALER | Refills: 10 | Status: SHIPPED | OUTPATIENT
Start: 2018-02-01 | End: 2018-02-12 | Stop reason: SDUPTHER

## 2018-02-01 RX ORDER — SPIRONOLACTONE 50 MG/1
50 TABLET, FILM COATED ORAL DAILY
Qty: 30 TAB | Refills: 0 | Status: SHIPPED | OUTPATIENT
Start: 2018-02-01 | End: 2018-03-01 | Stop reason: SDUPTHER

## 2018-02-01 RX ORDER — DIGOXIN 125 MCG
125 TABLET ORAL DAILY
Qty: 30 TAB | Refills: 0 | Status: SHIPPED | OUTPATIENT
Start: 2018-02-01 | End: 2018-03-01 | Stop reason: SDUPTHER

## 2018-02-01 RX ORDER — AMIODARONE HYDROCHLORIDE 200 MG/1
200 TABLET ORAL DAILY
Qty: 30 TAB | Refills: 0 | Status: SHIPPED | OUTPATIENT
Start: 2018-02-02 | End: 2018-03-01 | Stop reason: SDUPTHER

## 2018-02-01 RX ADMIN — ENOXAPARIN SODIUM 40 MG: 100 INJECTION SUBCUTANEOUS at 10:17

## 2018-02-01 RX ADMIN — SPIRONOLACTONE 50 MG: 25 TABLET, FILM COATED ORAL at 10:19

## 2018-02-01 RX ADMIN — AMIODARONE HYDROCHLORIDE 200 MG: 200 TABLET ORAL at 10:18

## 2018-02-01 RX ADMIN — IPRATROPIUM BROMIDE AND ALBUTEROL SULFATE 3 ML: .5; 3 SOLUTION RESPIRATORY (INHALATION) at 10:55

## 2018-02-01 RX ADMIN — CHLORHEXIDINE GLUCONATE 15 ML: 1.2 RINSE ORAL at 10:18

## 2018-02-01 RX ADMIN — IPRATROPIUM BROMIDE AND ALBUTEROL SULFATE 3 ML: .5; 3 SOLUTION RESPIRATORY (INHALATION) at 06:50

## 2018-02-01 RX ADMIN — IPRATROPIUM BROMIDE AND ALBUTEROL SULFATE 3 ML: .5; 3 SOLUTION RESPIRATORY (INHALATION) at 15:30

## 2018-02-01 RX ADMIN — FAMOTIDINE 20 MG: 20 TABLET, FILM COATED ORAL at 09:00

## 2018-02-01 RX ADMIN — ASPIRIN 81 MG: 81 TABLET, CHEWABLE ORAL at 10:18

## 2018-02-01 RX ADMIN — VALPROIC ACID 250 MG: 250 SOLUTION ORAL at 06:05

## 2018-02-01 RX ADMIN — NYSTATIN 500000 UNITS: 100000 SUSPENSION ORAL at 10:18

## 2018-02-01 RX ADMIN — LISINOPRIL 10 MG: 10 TABLET ORAL at 10:18

## 2018-02-01 RX ADMIN — DIGOXIN 125 MCG: 125 TABLET ORAL at 18:06

## 2018-02-01 RX ADMIN — IPRATROPIUM BROMIDE AND ALBUTEROL SULFATE 3 ML: .5; 3 SOLUTION RESPIRATORY (INHALATION) at 02:03

## 2018-02-01 ASSESSMENT — COGNITIVE AND FUNCTIONAL STATUS - GENERAL
PERSONAL GROOMING: A LITTLE
HELP NEEDED FOR BATHING: A LOT
TOILETING: A LITTLE
SUGGESTED CMS G CODE MODIFIER DAILY ACTIVITY: CK
DRESSING REGULAR LOWER BODY CLOTHING: A LITTLE
DAILY ACTIVITIY SCORE: 15
EATING MEALS: TOTAL
DRESSING REGULAR UPPER BODY CLOTHING: A LITTLE

## 2018-02-01 ASSESSMENT — PAIN SCALES - GENERAL
PAINLEVEL_OUTOF10: 0

## 2018-02-01 ASSESSMENT — LIFESTYLE VARIABLES: EVER_SMOKED: YES

## 2018-02-01 NOTE — THERAPY
"Occupational Therapy Treatment completed with focus on ADLs, ADL transfers and patient education.  Functional Status:  Pt seen for OT tx today, making steady progress with set POC. Pt performed LB dressing with min a, required assist with threading due to recent removal of chest tube, able to manage over hips in standing with cga, min a to don/doff hospital gown secondary to multiple line management, STS from recliner required min a, able to clear higher surface with sba, ambulated within room and hallway with FWW; required frequent restbreaks due to decreased activity tolerance. Pt would continue to benefit from acute skilled services while in house  Plan of Care: Will benefit from Occupational Therapy 3 times per week  Discharge Recommendations:  Equipment Will Continue to Assess for Equipment Needs. Post-acute therapy Discharge to a transitional care facility for continued skilled therapy services.    See \"Rehab Therapy-Acute\" Patient Summary Report for complete documentation.   "

## 2018-02-01 NOTE — CARE PLAN
Problem: Safety  Goal: Will remain free from injury  Outcome: PROGRESSING AS EXPECTED      Problem: Knowledge Deficit  Goal: Knowledge of disease process/condition, treatment plan, diagnostic tests, and medications will improve  Outcome: PROGRESSING SLOWER THAN EXPECTED

## 2018-02-01 NOTE — DISCHARGE PLANNING
Medical Social Work    Orders were placed for FWW, HH, and O2.     IFRAH met with pt and spouse.  Pt chose [facility] for DME and [facility].     Pt is not yet established with a PCP.  IFRAH contacted Kate with scheduling to set pt up for a PCP.      IFRAH faxed choice forms to [facility].

## 2018-02-01 NOTE — FACE TO FACE
Face to Face Note  -  Durable Medical Equipment    Reagan Phelan D.O. - NPI: [id number]  I certify that this patient is under my care and that they have had a durable medical equipment(DME)face to face encounter by myself that meets the physician DME face-to-face encounter requirements with this patient on:    Date of encounter:   Patient:                    MRN:                       YOB: 2020  Joshua Baiglps  [id number]  1965     The encounter with the patient was in whole, or in part, for the following medical condition, which is the primary reason for durable medical equipment:  COPD, Cardiac Disease and Other - Trach    I certify that, based on my findings, the following durable medical equipment is medically necessary:  Oxygen and Walkers.    HOME O2 Saturation Measurements:(Values must be present for Home Oxygen orders)  Room air sat at rest: 88  Room air sat with amb: 79  With liters of O2: 2, O2 sat at rest with O2: 96  With Liters of O2: 2, O2 sat with amb with O2 : 94  Is the patient mobile?: Yes    My Clinical findings support the need for the above equipment due to:  Abnormal Gait, Hypoxia    Supporting Symptoms:      ------------------------------------------------------------------------------------------------------------------    Face to Face Supporting Documentation - Home Health    The encounter with this patient was in whole or in part the primary reason for home health admission.    Date of encounter:   Patient:                    MRN:                       YOB: 2020  Joshua Baiglps  [id number]  1965     Home health to see patient for:  Skilled Nursing care for assessment, interventions & education, Physical Therapy evaluation and treatment and Occupational therapy evaluation and treatment    Skilled need for:  Surgical Aftercare Trach, post CABG    Skilled nursing interventions to include:  Comment: Trach care and wound (post  chest tube)    Homebound evidenced status by:  Need the aid of supportive devices such as crutches, canes, wheelchairs or walkers. Leaving home must require a considerable and taxing effort. There must exist a normal inability to leave the home.    Community Physician to provide follow up care: Pcp Pt States None     Optional Interventions    Wound information & treatment:    Home Infusion Therapy orders:    Line/Drain/Airway:    I certify the face to face encounter for this home care referral meets the CMS requirements and the encounter/clinical assessment with the patient was, in whole, or in part, for the medical condition(s) listed above, which is the primary reason for home health care. Based on my clinical findings: the service(s) are medically necessary, support the need for home health care, and the homebound criteria are met.  I certify that this patient has had a face to face encounter by myself.  Reagan Phelan D.O. - NPI: [id number]    *Debility, frailty and advanced age in the absence of an acute deterioration or exacerbation of a condition do not qualify a patient for home health.

## 2018-02-01 NOTE — THERAPY
"Speech Language Therapy dysphagia treatment completed.   Functional Status:  Patient seen for reassessment of swallowing function on this date, per RN request.  Upon entering the room, patient was drinking thin liquids from a cup.  However, when questioned, he stated, \"I wasn't drinking that.\"  He later stated the the MD had been giving him \"fudgsicles and juice.\"  RN also stated that he had visualized the patient's wife giving him a \"[place] candy bar.\" Presentation of PO included nectars, purees, soft solids, and thin liquids.  The patient presented with prolonged mastication of soft solids (edentulous) but timely initiation of swallow trigger.  He independently spit out fibrous soft solids which were difficult for him to chew.  The patient had reflexive throat clearing throughout the session, both with and without PO intake, but may still be concerning for PO intake.  Provided instruction and education to swallowing strategies (small bites/sips and double swallow) but the patient did not utilize, given moderate cues. Patient's wife was present and argumentative during education to strategies and diet modifications.  Further, the patient's wife stated, \"He won't be taking this medication when he gets home.\"   Of note, the patient continues to present with impaired insight into deficits and stated, \"I did good on that cognitive test,\" though he had been educated to results.     Recommendations: At this time, recommend Dysphagia 2/nectars diet with close supervision during all PO intake.  Patient to be up to a chair for meals.  Consider a CXR within the next 24-48 hours. SLP following during acute care.    Plan of Care: Will benefit from Speech Therapy 5 times per week  Post-Acute Therapy: Discharge to home with outpatient or home health for additional skilled therapy services versus discharge to a transitional facility    See \"Rehab Therapy-Acute\" Patient Summary Report for complete documentation.     "

## 2018-02-01 NOTE — CARE PLAN
Problem: Safety  Goal: Will remain free from falls    Intervention: Assess risk factors for falls   01/24/18 2000 01/31/18 0800 02/01/18 0415   OTHER   Fall Risk --  High Risk to Fall - 2 or more points  --    Risk for Injury-Any positive answers results in the pt being at high risk for fall related injury --  Surgery: Thoracic or Abdominal Surgery or Lower Limb Amputation --    Mobility Status Assessment --  2-2 Healthcare Providers Required for Assistance with Ambulation & Transfer --    History of fall 2 --  --    Date of Last Fall 01/22/18 --  --    Pt Calls for Assistance --  --  Yes           Problem: Respiratory:  Goal: Respiratory status will improve    Intervention: Assess and monitor pulmonary status   02/01/18 0205 02/01/18 0400   Vitals   Respiration --  (!) 24   Pulse Oximetry --  96 %   OTHER   O2 (LPM) 5 --    Work Of Breathing / Effort --  Mild   Pre/Post Intervention Pre Intervention Assessment --    RUL Breath Sounds --  Clear   RML Breath Sounds --  Clear   RLL Breath Sounds --  Diminished   GENNARO Breath Sounds --  Expiratory Wheezes   LLL Breath Sounds --  Diminished   Respiratory   Cough --  Productive

## 2018-02-01 NOTE — DISCHARGE PLANNING
Received choice forms from [facility]). Referrals sent to [facility] and [facility].

## 2018-02-01 NOTE — CARE PLAN
Problem: Communication  Goal: The ability to communicate needs accurately and effectively will improve    Intervention: Educate patient and significant other/support system about the plan of care, procedures, treatments, medications and allow for questions  Pt wishes to be heard, states that he wants to leave the hospital and live his last days at home and not in the hospital being told and limited on what he can and can't do. Communicated these wishes to day nurse to relay to the MD.

## 2018-02-01 NOTE — DISCHARGE PLANNING
Care Transition Team Assessment  Per bedside RN, pt is requesting a care conference.  IFRAH met with pt at bedside.  Pt stated that he is not progressing as fast as he thinks he should be.  IFRAH spoke to Dr. Evans and Dr. Navarro.  Both will be at the Care Conference 2/1 @1300.  IFRAH LM for with Dr. Paez's medical assistant, Obey and is waiting to hear back.  IFRHA updated MIN Hartman.     Information Source  Orientation : Oriented x 4  Information Given By: Patient  Who is responsible for making decisions for patient? : Patient    Readmission Evaluation  Is this a readmission?: No    Elopement Risk  Legal Hold: No  Ambulatory or Self Mobile in Wheelchair: No-Not an Elopement Risk  Elopement Risk: Not at Risk for Elopement    Interdisciplinary Discharge Planning  Does Admitting Nurse Feel This Could be a Complex Discharge?: No  Lives with - Patient's Self Care Capacity: Spouse  Patient or legal guardian wants to designate a caregiver (see row info): No  Support Systems: Children, Spouse / Significant Other  Housing / Facility: 1 Story Apartment / Condo  Do You Take your Prescribed Medications Regularly: Yes  Able to Return to Previous ADL's: Future Time w/Therapy  Mobility Issues: Yes  Prior Services: Other (Comments) ([facility])  Patient Expects to be Discharged to:: unkown  Assistance Needed: Unknown at this Time  Durable Medical Equipment: Other - Specify (cane)    Discharge Preparedness  What is your plan after discharge?: Uncertain - pending medical team collaboration  What are your discharge supports?: Child, Spouse  Prior Functional Level: Ambulatory, Uses Cane    Functional Assesment  Prior Functional Level: Ambulatory, Uses Cane    Finances  Financial Barriers to Discharge: No  Prescription Coverage: Yes    Vision / Hearing Impairment  Vision Impairment : No  Hearing Impairment : No    Values / Beliefs / Concerns  Values / Beliefs Concerns : No    Domestic Abuse  Have you ever been the victim of abuse or violence?:  No  Physical Abuse or Sexual Abuse: No  Verbal Abuse or Emotional Abuse: No  Possible Abuse Reported to:: Not Applicable

## 2018-02-01 NOTE — PROGRESS NOTES
Pt went down to [facility] with wife and tech/sitter per MD order.  Per tech report, pt tolerated well and was able to cooperate during the trip.

## 2018-02-01 NOTE — PROGRESS NOTES
"Progress Note:  2/1/2018, 8:30 AM    S: No acute events    O:  Blood pressure 111/60, pulse 61, temperature 36.9 °C (98.4 °F), resp. rate (!) 22, height 1.956 m (6' 5\"), weight (!) 133.1 kg (293 lb 6.9 oz), SpO2 97 %.    NAD, awake and alert  Breathing nonlabored via tracheostomy  L chest tube with no air leak    CXR without obvious pneumothorax    A:   Active Hospital Problems    Diagnosis   • Pleural effusion on left [J90]     Priority: High   • Leukocytosis [D72.829]   • Normocytic anemia [D64.9]   • Hypernatremia [E87.0]   • Atrial flutter with rapid ventricular response (CMS-[facility]) [I48.92]   • Acute respiratory failure with hypoxia (CMS-[facility]) [J96.01]   • Chronic systolic CHF (congestive heart failure), NYHA class 4 (CMS-[facility]) [I50.22]   • Type 2 diabetes mellitus (CMS-[facility]) [E11.9]     52M s/p evacuation of chronic L hemothorax, with decortication and pleurodesis    Air leak resolved  Chest tube removed  Leukocytosis of unclear origin, improving today    P:   -OK for d/c from surgical standpoint  -May shower POD #1 over Tegaderms, no soaking underwater  -F/U 1-2 weeks after discharge for chest tube suture removal  -Pt to call me for drainage from or redness around incisions, fever, chills  -Please call me with questions/concerns/updates      Chandu Paez M.D.  [facility]  [phone]    "

## 2018-02-01 NOTE — DISCHARGE INSTRUCTIONS
Discharge Instructions    Discharged to home with spouse. Discharged via wheelchair, hospital escort: Refused.  Special equipment needed: Oxygen    Be sure to schedule a follow-up appointment with your primary care doctor or any specialists as instructed.     Discharge Plan:   Smoking Cessation Offered: Patient CounseledDischarge Instructions    Discharged to home with wife. Discharged via wheelchair, hospital escort: Refused.  Special equipment needed: Oxygen    Be sure to schedule a follow-up appointment with your primary care doctor or any specialists as instructed.     Discharge Plan:   Diet Plan: Discussed  Activity Level: Discussed  Smoking Cessation Offered: Patient Counseled  Confirmed Follow up Appointment: Patient to Call and Schedule Appointment  Confirmed Symptoms Management: Discussed  Medication Reconciliation Updated: Yes  Influenza Vaccine Indication: Patient Refuses    I understand that a diet low in cholesterol, fat, and sodium is recommended for good health. Unless I have been given specific instructions below for another diet, I accept this instruction as my diet prescription.   Other diet: dysphagia level 2 (pureed) with nectar thick liquids    Special Instructions: None    · Is patient discharged on Warfarin / Coumadin?   Yes    You are receiving the drug warfarin. Please understand the importance of monitoring warfarin with scheduled PT/INR blood draws.  [facility]up with [facility] in one week for INR lab..    IMPORTANT: HOW TO USE THIS INFORMATION:  This is a summary and does NOT have all possible information about this product. This information does not assure that this product is safe, effective, or appropriate for you. This information is not individual medical advice and does not substitute for the advice of your health care professional. Always ask your health care professional for complete information about this product and your specific health needs.      WARFARIN - ORAL  "(WARF-uh-rin)      COMMON BRAND NAME(S): Coumadin      WARNING:  Warfarin can cause very serious (possibly fatal) bleeding. This is more likely to occur when you first start taking this medication or if you take too much warfarin. To decrease your risk for bleeding, your doctor or other health care provider will monitor you closely and check your lab results (INR test) to make sure you are not taking too much warfarin. Keep all medical and laboratory appointments. Tell your doctor right away if you notice any signs of serious bleeding. See also Side Effects section.      USES:  This medication is used to treat blood clots (such as in deep vein thrombosis-DVT or pulmonary embolus-PE) and/or to prevent new clots from forming in your body. Preventing harmful blood clots helps to reduce the risk of a stroke or heart attack. Conditions that increase your risk of developing blood clots include a certain type of irregular heart rhythm (atrial fibrillation), heart valve replacement, recent heart attack, and certain surgeries (such as hip/knee replacement). Warfarin is commonly called a \"blood thinner,\" but the more correct term is \"anticoagulant.\" It helps to keep blood flowing smoothly in your body by decreasing the amount of certain substances (clotting proteins) in your blood.      HOW TO USE:  Read the Medication Guide provided by your pharmacist before you start taking warfarin and each time you get a refill. If you have any questions, ask your doctor or pharmacist. Take this medication by mouth with or without food as directed by your doctor or other health care professional, usually once a day. It is very important to take it exactly as directed. Do not increase the dose, take it more frequently, or stop using it unless directed by your doctor. Dosage is based on your medical condition, laboratory tests (such as INR), and response to treatment. Your doctor or other health care provider will monitor you closely while " you are taking this medication to determine the right dose for you. Use this medication regularly to get the most benefit from it. To help you remember, take it at the same time each day. It is important to eat a balanced, consistent diet while taking warfarin. Some foods can affect how warfarin works in your body and may affect your treatment and dose. Avoid sudden large increases or decreases in your intake of foods high in vitamin K (such as broccoli, cauliflower, cabbage, brussels sprouts, kale, spinach, and other green leafy vegetables, liver, green tea, certain vitamin supplements). If you are trying to lose weight, check with your doctor before you try to go on a diet. Cranberry products may also affect how your warfarin works. Limit the amount of cranberry juice (16 ounces/480 milliliters a day) or other cranberry products you may drink or eat.      SIDE EFFECTS:  Nausea, loss of appetite, or stomach/abdominal pain may occur. If any of these effects persist or worsen, tell your doctor or pharmacist promptly. Remember that your doctor has prescribed this medication because he or she has judged that the benefit to you is greater than the risk of side effects. Many people using this medication do not have serious side effects. This medication can cause serious bleeding if it affects your blood clotting proteins too much (shown by unusually high INR lab results). Even if your doctor stops your medication, this risk of bleeding can continue for up to a week. Tell your doctor right away if you have any signs of serious bleeding, including: unusual pain/swelling/discomfort, unusual/easy bruising, prolonged bleeding from cuts or gums, persistent/frequent nosebleeds, unusually heavy/prolonged menstrual flow, pink/dark urine, coughing up blood, vomit that is bloody or looks like coffee grounds, severe headache, dizziness/fainting, unusual or persistent tiredness/weakness, bloody/black/tarry stools, chest pain,  shortness of breath, difficulty swallowing. Tell your doctor right away if any of these unlikely but serious side effects occur: persistent nausea/vomiting, severe stomach/abdominal pain, yellowing eyes/skin. This drug rarely has caused very serious (possibly fatal) problems if its effects lead to small blood clots (usually at the beginning of treatment). This can lead to severe skin/tissue damage that may require surgery or amputation if left untreated. Patients with certain blood conditions (protein C or S deficiency) may be at greater risk. Get medical help right away if any of these rare but serious side effects occur: painful/red/purplish patches on the skin (such as on the toe, breast, abdomen), change in the amount of urine, vision changes, confusion, slurred speech, weakness on one side of the body. A very serious allergic reaction to this drug is rare. However, get medical help right away if you notice any symptoms of a serious allergic reaction, including: rash, itching/swelling (especially of the face/tongue/throat), severe dizziness, trouble breathing. This is not a complete list of possible side effects. If you notice other effects not listed above, contact your doctor or pharmacist. In the [place] - Call your doctor for medical advice about side effects. You may report side effects to FDA at 1-469-PUC-0781. In [place] - Call your doctor for medical advice about side effects. You may report side effects to [facility] [place] at 1-763.628.6703.      PRECAUTIONS:  Before taking warfarin, tell your doctor or pharmacist if you are allergic to it; or if you have any other allergies. This product may contain inactive ingredients, which can cause allergic reactions or other problems. Talk to your pharmacist for more details. Before using this medication, tell your doctor or pharmacist your medical history, especially of: blood disorders (such as anemia, hemophilia), bleeding problems (such as bleeding of the  stomach/intestines, bleeding in the brain), blood vessel disorders (such as aneurysms), recent major injury/surgery, liver disease, alcohol use, mental/mood disorders (including memory problems), frequent falls/injuries. It is important that all your doctors and dentists know that you take warfarin. Before having surgery or any medical/dental procedures, tell your doctor or dentist that you are taking this medication and about all the products you use (including prescription drugs, nonprescription drugs, and herbal products). Avoid getting injections into the muscles. If you must have an injection into a muscle (for example, a flu shot), it should be given in the arm. This way, it will be easier to check for bleeding and/or apply pressure bandages. This medication may cause stomach bleeding. Daily use of alcohol while using this medicine will increase your risk for stomach bleeding and may also affect how this medication works. Limit or avoid alcoholic beverages. If you have not been eating well, if you have an illness or infection that causes fever, vomiting, or diarrhea for more than 2 days, or if you start using any antibiotic medications, contact your doctor or pharmacist immediately because these conditions can affect how warfarin works. This medication can cause heavy bleeding. To lower the chance of getting cut, bruised, or injured, use great caution with sharp objects like safety razors and nail cutters. Use an electric razor when shaving and a soft toothbrush when brushing your teeth. Avoid activities such as contact sports. If you fall or injure yourself, especially if you hit your head, call your doctor immediately. Your doctor may need to check you. The Food & Drug Administration has stated that generic warfarin products are interchangeable. However, consult your doctor or pharmacist before switching warfarin products. Be careful not to take more than one medication that contains warfarin unless  "specifically directed by the doctor or health care provider who is monitoring your warfarin treatment. Older adults may be at greater risk for bleeding while using this drug. This medication is not recommended for use during pregnancy because of serious (possibly fatal) harm to an unborn baby. Discuss the use of reliable forms of birth control with your doctor. If you become pregnant or think you may be pregnant, tell your doctor immediately. If you are planning pregnancy, discuss a plan for managing your condition with your doctor before you become pregnant. Your doctor may switch the type of medication you use during pregnancy. Very small amounts of this medication may pass into breast milk but is unlikely to harm a nursing infant. Consult your doctor before breast-feeding.      DRUG INTERACTIONS:  Drug interactions may change how your medications work or increase your risk for serious side effects. This document does not contain all possible drug interactions. Keep a list of all the products you use (including prescription/nonprescription drugs and herbal products) and share it with your doctor and pharmacist. Do not start, stop, or change the dosage of any medicines without your doctor's approval. Warfarin interacts with many prescription, nonprescription, vitamin, and herbal products. This includes medications that are applied to the skin or inside the vagina or rectum. The interactions with warfarin usually result in an increase or decrease in the \"blood-thinning\" (anticoagulant) effect. Your doctor or other health care professional should closely monitor you to prevent serious bleeding or clotting problems. While taking warfarin, it is very important to tell your doctor or pharmacist of any changes in medications, vitamins, or herbal products that you are taking. Some products that may interact with this drug include: capecitabine, imatinib, mifepristone. Aspirin, aspirin-like drugs (salicylates), and " nonsteroidal anti-inflammatory drugs (NSAIDs such as ibuprofen, naproxen, celecoxib) may have effects similar to warfarin. These drugs may increase the risk of bleeding problems if taken during treatment with warfarin. Carefully check all prescription/nonprescription product labels (including drugs applied to the skin such as pain-relieving creams) since the products may contain NSAIDs or salicylates. Talk to your doctor about using a different medication (such as acetaminophen) to treat pain/fever. Low-dose aspirin and related drugs (such as clopidogrel, ticlopidine) should be continued if prescribed by your doctor for specific medical reasons such as heart attack or stroke prevention. Consult your doctor or pharmacist for more details. Many herbal products interact with warfarin. Tell your doctor before taking any herbal products, especially bromelains, coenzyme Q10, cranberry, danshen, dong quai, fenugreek, garlic, ginkgo biloba, ginseng, and Ronak's wort, among others. This medication may interfere with a certain laboratory test to measure theophylline levels, possibly causing false test results. Make sure laboratory personnel and all your doctors know you use this drug.      OVERDOSE:  If overdose is suspected, contact a poison control center or emergency room immediately. [place] residents can call the [place] National Poison Hotline at 1-488.414.8179. [place] residents can call a provincial poison control center. Symptoms of overdose may include: bloody/black/tarry stools, pink/dark urine, unusual/prolonged bleeding.      NOTES:  Do not share this medication with others. Laboratory and/or medical tests (such as INR, complete blood count) must be performed periodically to monitor your progress or check for side effects. Consult your doctor for more details.      MISSED DOSE:  For the best possible benefit, do not miss any doses. If you do miss a dose and remember on the same day, take it as soon as you remember. If  you remember on the next day, skip the missed dose and resume your usual dosing schedule. Do not double the dose to catch up because this could increase your risk for bleeding. Keep a record of missed doses to give to your doctor or pharmacist. Contact your doctor or pharmacist if you miss 2 or more doses in a row.      STORAGE:  Store at room temperature away from light and moisture. Do not store in the bathroom. Keep all medications away from children and pets. Do not flush medications down the toilet or pour them into a drain unless instructed to do so. Properly discard this product when it is  or no longer needed. Consult your pharmacist or local waste disposal company for more details about how to safely discard your product.      MEDICAL ALERT:  Your condition and medication can cause complications in a medical emergency. For information about enrolling in MedicAlert, call 1-330.868.3581 ([place]) or 1-513.649.2015 ([place]).      Information last revised 2010 Copyright(c)  [employer]             Depression / Suicide Risk    As you are discharged from this [facility] facility, it is important to learn how to keep safe from harming yourself.    Recognize the warning signs:  · Abrupt changes in personality, positive or negative- including increase in energy   · Giving away possessions  · Change in eating patterns- significant weight changes-  positive or negative  · Change in sleeping patterns- unable to sleep or sleeping all the time   · Unwillingness or inability to communicate  · Depression  · Unusual sadness, discouragement and loneliness  · Talk of wanting to die  · Neglect of personal appearance   · Rebelliousness- reckless behavior  · Withdrawal from people/activities they love  · Confusion- inability to concentrate     If you or a loved one observes any of these behaviors or has concerns about self-harm, here's what you can do:  · Talk about it- your feelings and reasons for  harming yourself  · Remove any means that you might use to hurt yourself (examples: pills, rope, extension cords, firearm)  · Get professional help from the community (Mental Health, Substance Abuse, psychological counseling)  · Do not be alone:Call your Safe Contact- someone whom you trust who will be there for you.  · Call your local CRISIS HOTLINE 461-4516 or 604-029-4212  · Call your local Children's Mobile Crisis Response Team [place] (361) 975-7628 or wwwSyrmo  · Call the toll free National Suicide Prevention Hotlines   · National Suicide Prevention Lifeline 439-337-JGJH (2028)  · National Hope Line Network 800-SUICIDE (710-8811)      Influenza Vaccine Indication:  (unable to determine at this time)    I understand that a diet low in cholesterol, fat, and sodium is recommended for good health. Unless I have been given specific instructions below for another diet, I accept this instruction as my diet prescription.   Other diet: dysphagia level 2 (pureed) with nectar thick liquids    Special Instructions: None    · Is patient discharged on Warfarin / Coumadin?   No     Depression / Suicide Risk    As you are discharged from this [facility] facility, it is important to learn how to keep safe from harming yourself.    Recognize the warning signs:  · Abrupt changes in personality, positive or negative- including increase in energy   · Giving away possessions  · Change in eating patterns- significant weight changes-  positive or negative  · Change in sleeping patterns- unable to sleep or sleeping all the time   · Unwillingness or inability to communicate  · Depression  · Unusual sadness, discouragement and loneliness  · Talk of wanting to die  · Neglect of personal appearance   · Rebelliousness- reckless behavior  · Withdrawal from people/activities they love  · Confusion- inability to concentrate     If you or a loved one observes any of these behaviors or has concerns about self-harm, here's  what you can do:  · Talk about it- your feelings and reasons for harming yourself  · Remove any means that you might use to hurt yourself (examples: pills, rope, extension cords, firearm)  · Get professional help from the community (Mental Health, Substance Abuse, psychological counseling)  · Do not be alone:Call your Safe Contact- someone whom you trust who will be there for you.  · Call your local CRISIS HOTLINE 203-4355 or 913-486-6197  · Call your local Children's Mobile Crisis Response Team [place] (881) 850-9733 or www.[employer]  · Call the toll free National Suicide Prevention Hotlines   · National Suicide Prevention Lifeline 951-410-TFWA (5604)  · National Hope Line Network 800-SUICIDE (496-5188)

## 2018-02-01 NOTE — PROGRESS NOTES
[facility] Hospitalist Progress Note    Date of Service: 2018    Chief Complaint  52 y.o. male admitted 2018 with recurrent L pleural effusion.  Recent admit  for CHF exacerbation and found to have multivessl CAD.  S/p CABG x2 vessel on that admission.  Hospital stay complicated by HCAP with MSSA and Klebsiella from sutum.  Had recurrent L side effusion requiring tap on 3 prior occasions last     Interval Problem Update  Refused am labs  disconected himself from chest tube  AFlttr in 60's  AFebrile  TF's to goal  Ambulating  5 litres O2 T piece    Ceballos out        Consultants/Specialty  Pulmnology  CT Surgery    Disposition  Cont in ICU        ROS     Physical Exam  Laboratory/Imaging   Hemodynamics  Temp (24hrs), Av.9 °C (98.5 °F), Min:36.8 °C (98.3 °F), Max:37.1 °C (98.8 °F)   Temperature: 36.9 °C (98.4 °F)  Pulse  Av.8  Min: 40  Max: 168 Heart Rate (Monitored): 62  NIBP: 102/58      Respiratory    #Aerosol Therapy / Airway Management: T-Piece, Aerosol Humidity Temp (celsius): 31 Respiration: (!) 24, Pulse Oximetry: 96 %, O2 Daily Delivery Respiratory : T-Piece  Chest Tube Group 1 (A) Left STRAIGHT  28-Tube Status / Drainage: Draining;Small;Serosanguinous  Given By:: Trache, Work Of Breathing / Effort: Mild  RUL Breath Sounds: Clear, RML Breath Sounds: Clear, RLL Breath Sounds: Diminished, GENNARO Breath Sounds: Expiratory Wheezes, LLL Breath Sounds: Diminished    Fluids    Intake/Output Summary (Last 24 hours) at 18 0602  Last data filed at 18 0000   Gross per 24 hour   Intake             1710 ml   Output              300 ml   Net             1410 ml       Nutrition  Orders Placed This Encounter   Procedures   • Diet NPO     Standing Status:   Standing     Number of Occurrences:   1     Order Specific Question:   Type:     Answer:   Now [1]     Order Specific Question:   Restrict to:     Answer:   Strict [1]     Physical Exam   Constitutional: He is oriented to person, place,  and time. He appears well-developed and well-nourished. No distress.   HENT:   Head: Normocephalic and atraumatic.   Eyes: Conjunctivae are normal. No scleral icterus.   Neck: No JVD present.   Trach and T piece   Cardiovascular: Normal rate.  Exam reveals no gallop.    No murmur heard.  Pulmonary/Chest: Effort normal. No stridor. No respiratory distress. He has no wheezes. He has rales.   L   Abdominal: Soft. There is no tenderness. There is no rebound and no guarding.   Musculoskeletal: He exhibits edema.   Neurological: He is oriented to person, place, and time.   Skin: Skin is warm and dry. No rash noted. He is not diaphoretic.   Psychiatric: He has a normal mood and affect. Thought content normal.   Nursing note and vitals reviewed.      Recent Labs      01/30/18   0500  02/01/18   0410   WBC  23.6*  17.8*   RBC  3.73*  3.40*   HEMOGLOBIN  8.2*  7.3*   HEMATOCRIT  30.8*  27.3*   MCV  82.6  80.3*   MCH  22.0*  21.5*   MCHC  26.6*  26.7*   RDW  63.3*  60.6*   PLATELETCT  345  370   MPV  10.2  10.8     Recent Labs      01/30/18   0500  02/01/18   0410   SODIUM  139  135   POTASSIUM  4.4  5.5   CHLORIDE  105  104   CO2  27  23   GLUCOSE  129*  101*   BUN  34*  32*   CREATININE  0.87  0.86   CALCIUM  8.5  8.4*                      Assessment/Plan     Pleural effusion on left   Assessment & Plan    -Now status post left thoracoscopy, partial thoracoscopic decortication, mechanical and chemical pleurodesis  - Patient is improved postoperatively, now with good sats on T-piece  - 1 chest tube has been removed, the other is at water seal  -Surgery Dr Paez following        Hypernatremia   Assessment & Plan    - Improved on free water flushes, continue  - Repeat BMP in the morning        Normocytic anemia   Assessment & Plan    - No sign of gross bleeding  - Repeat CBC in the morning          Leukocytosis   Assessment & Plan    - Possibly reactive, stable, repeat CBC in the morning  - Await cultures from surgery,  otherwise cultures are pending  - If patient worsens will consider antibiotics        Acute respiratory failure with hypoxia (CMS-HCC)   Assessment & Plan    -Improved postoperatively, now with good sats on T-piece  - Recommendations per intensivist        Atrial flutter with rapid ventricular response (CMS-HCC)   Assessment & Plan    -Improved  - Continue amiodarone and digoxin, obtain digoxin level  - Avoid full dose anticoagulation at this time        Chronic systolic CHF (congestive heart failure), NYHA class 4 (CMS-HCC)- (present on admission)   Assessment & Plan    - Does have some edema, no need for Lasix at this time, last time Lasix was used he had worsening of his kidney function  - Most recent ejection fraction was 27%  - Medical management is able        Type 2 diabetes mellitus (CMS-HCC)- (present on admission)   Assessment & Plan    - Mildly elevated, no need for coverage at this time  -Start insulin sliding scale if needed            Reviewed items::  EKG reviewed, Labs reviewed, Medications reviewed and Radiology images reviewed  DVT prophylaxis pharmacological::  Enoxaparin (Lovenox)  DVT prophylaxis - mechanical:  SCDs  Ulcer Prophylaxis::  Not indicated     Pt is alert and oriented however does not completely understand his clinical situation and the consequences of his decisions.  I do not think he is competent to make medical decisions at this point.

## 2018-02-02 ENCOUNTER — PATIENT OUTREACH (OUTPATIENT)
Dept: HEALTH INFORMATION MANAGEMENT | Facility: OTHER | Age: 53
End: 2018-02-02

## 2018-02-02 NOTE — DISCHARGE PLANNING
Followed up with [facility] and [facility]. Referral still being processed. [place](IFRAH) updated.

## 2018-02-02 NOTE — DISCHARGE SUMMARY
DATE OF ADMISSION:  01/23/2018    DATE OF DISCHARGE:  02/01/2018    DISCHARGE DIAGNOSES:  1.  Pleural effusion, status post chest tube drainage.  2.  Hypernatremia, resolved.  3.  Acute hypoxic respiratory failure, resolved.  4.  Atrial fibrillation with rapid ventricular response, resolved.  5.  New York Heart Association class IV congestive heart failure.  6.  Type 2 diabetes.    CONSULTANTS:  Dr. Chandu Paez, cardiothoracic surgery.    PROCEDURES:  Left thoracoscopy with partial thoracoscopic decortication,   mechanical and chemical pleurodesis by Dr. Chandu Paez on 01/25/2018.    HOSPITAL COURSE:  Briefly, this is a 52-year-old gentleman who had originally   been admitted to the hospital on December 19th of last year.  At that time, he   underwent cardiac catheterization, which demonstrated multivessel disease and   subsequently underwent coronary artery bypass graft on December 22nd with Dr. Kiel Ash.  His hospital course was complicated by development of an MSSA   and Klebsiella pneumoniae.  He had pleural effusion which was drained on 2   occasions.    Patient was admitted on the 23rd of January with a reoccurrence of the same   pleural effusion.  Dr. Chandu Paez of cardiothoracic surgery was consulted,   and the patient subsequently underwent a pleurodesis on the 25th.    The patient did well, and the chest tube was eventually removed on February 1st with suture closure of the defect.    At this point in time, we are discharging the patient home.  He has been   stable on room air with his trach cap, we have made an appointment for him for   the decannulation clinic as an outpatient in 1 week.  He has a scheduled   followup as well with Dr. Chandu Paez.  We have arranged home health, and   make sure his tracheostomy is doing well, as well as physical therapy and   occupational therapy to assist him in developing more strength.  On the day of   discharge, he is ambulating in the hallway with front  wheeled walker   independently.  He is tolerating p.o. diet without any issues.  He is   maintaining sats on 2 L nasal cannula oxygen, and we will be sending him home   with his O2.    DISCHARGE MEDICATIONS:  1.  Albuterol p.r.n.  2.  Xanax 0.5 mg as needed for anxiety.  3.  Aspirin 81 mg p.o. daily.  4.  Amiodarone 200 mg p.o. daily.  5.  Lipitor 40 mg in the evening.  6.  Famotidine 20 mg daily.  7.  Lasix 40 mg once daily.  8.  Lisinopril 10 mg daily.  9.  Oxycodone 5 mg every 4 hours as needed for pain.  10.  Aldactone 50 mg daily.  11.  Coumadin 5 mg daily.    FOLLOWUP:  As noted above.  Additionally, the patient is to follow with the   anticoagulation clinic.  I have reviewed discharge instructions with both he   and his wife on the day of discharge, I have spent 40 minutes at the bedside   reviewing discharge plans, medications, etc.  All questions have been   answered, on the day of discharge it is noted that the patient himself is   quite eager to go home as his wife to take him home.       ____________________________________     DO LESLI Calixto / YOSVANY    DD:  02/01/2018 18:17:39  DT:  02/01/2018 18:46:35    D#:  [id number]  Job#:  [id number]

## 2018-02-02 NOTE — DISCHARGE PLANNING
[facility] has accepted patient. Followed up with [facility]. Spoke to Devin who states referral is being processed. Calista([facility]) updated and will follow.

## 2018-02-02 NOTE — DISCHARGE PLANNING
Received call from pts RN [phone], requesting update on pts O2. Appears at 1620 Lalo stated it was still being processed.    TC to Lalo ([phone]), after hours answering service answered, O2 has been dropped off.    TC to RN confirmed O2 was just delivered.

## 2018-02-03 ENCOUNTER — HOSPITAL ENCOUNTER (EMERGENCY)
Facility: MEDICAL CENTER | Age: 53
DRG: 291 | End: 2018-02-03
Attending: EMERGENCY MEDICINE
Payer: MEDICARE

## 2018-02-03 VITALS
SYSTOLIC BLOOD PRESSURE: 111 MMHG | OXYGEN SATURATION: 92 % | HEART RATE: 72 BPM | TEMPERATURE: 97.8 F | BODY MASS INDEX: 34.65 KG/M2 | WEIGHT: 293.43 LBS | DIASTOLIC BLOOD PRESSURE: 60 MMHG | RESPIRATION RATE: 17 BRPM | HEIGHT: 77 IN

## 2018-02-03 DIAGNOSIS — J95.03 TRACHEOSTOMY MALFUNCTION (HCC): ICD-10-CM

## 2018-02-03 PROCEDURE — 99284 EMERGENCY DEPT VISIT MOD MDM: CPT

## 2018-02-03 NOTE — DISCHARGE PLANNING
Medical Social Work     IFRAH was asked assist with the pt HH and DME. The pt was discharged from [facility] on 2/1/17 and HH was set up. The pt and his wife stated that it has been 48 hours and that [facility] never showed up for there appointment with them today. The pt is back in the ER because they never [facility] never showed up. The pt stated that they also never received education on tracheostomy care.     IFRAH called [facility] and spoke with Katie and advised her of the pt situation. Katie stated that the pt was over looked and that she would escalate this to her supervisor. Katie stated that [facility] will be calling the pt sometime today to set up the pt HH appointment. Katie provided Sw with the phone number of [phone] to give to the family.     IFRAH met with the pt and his family and updated them on the information [facility] provided and provided the pt with the phone number for [facility]. The pt and family also requested education on Trech care. IFRAH spoke with the bedside RN and advised her to please have respiratory provide Trech care and with supplies till they get there DME supply's in.     IFRAH spoke with the pt and his family and advised them that [facility] is escalating this matter to there supervisors. IFRAH also provided them with the number to [facility]. Education on Trech care will be provided before pt discharges. IFRAH updated the MD about the pt HH and DME.     IFRAH also provided the pt with the ER SW information incase the need assistance once they discharged from the ER.     Plan: IFRHA will remain available for pt and family support.

## 2018-02-03 NOTE — DISCHARGE PLANNING
At this time [facility]  will make a 2nd attempt to reach out to this patient to schedule an appointment.

## 2018-02-03 NOTE — DISCHARGE INSTRUCTIONS
Please return to the emergency department if you have any further concerns regarding his tracheostomy. Return immediately if he develops any bleeding from the trach, if he has any obstructions, or if you have any further concerns.    Tracheostomy, Care After  Refer to this sheet in the next few weeks. These instructions provide you with information on caring for yourself after your procedure. Your health care provider may also give you more specific instructions. Your treatment has been planned according to current medical practices, but problems sometimes occur. Call your health care provider if you have any problems or questions after your procedure.  WHAT TO EXPECT AFTER THE PROCEDURE   After your procedure, it is typical to have the following:   · Crusting and slight bleeding around the tracheostomy.  · Inability to speak with your normal voice.  · Increased coughing with mucus.  · Limited sense of smell and taste.  HOME CARE INSTRUCTIONS  · Make sure you and your home caregivers know how to:  ¨ Clean and replace your tracheostomy tube.  ¨ Keep your tracheostomy moist and free of mucus.  ¨ Protect your tracheostomy opening from water and irritants.  ¨ Suction your trachea.  ¨ Care for the skin around your tracheostomy.  · Have a ready supply of everything you need for tracheostomy care.  · Avoid strenuous activity for 6 weeks.  · Return to normal activities gradually. Ask your health care provider when you can go back to work.  · Eat what you usually do.  ¨ Your senses of smell and taste may be reduced from not breathing through your nose.  ¨ Adding spices and herbs may improve food flavor.  · Drink enough fluids to:  ¨ Keep mucus secretions moist.  ¨ Keep urine clear or pale yellow.  · Protect your trachea from dry air and irritants.  ¨ Cover the opening with moist gauze or a stoma bib.  ¨ Use a humidifier in your home when the heat is on.  · Keep water out of your trachea.  ¨ Use a shower shield when you take  a bath or shower.  ¨ Do not swim.  · Work with a speech therapist to find the best way for you to communicate. There are many options for speech after tracheostomy.  · When you change the ribbons that hold your tracheostomy tube in place, make sure you can fit two fingers between the ribbons and your neck.  · Always wear your medical alert jewelry.  SEEK MEDICAL CARE IF:   · You have chills or fever.  · You have persistent cough and more mucus.  · You have increased crusting, bleeding, or irritation around your tracheostomy opening.  SEEK IMMEDIATE MEDICAL CARE IF:  · You have trouble breathing.  · Your tracheostomy tube comes out and you cannot replace it.  · You have persistent bleeding or unusual drainage from your tracheostomy site.     This information is not intended to replace advice given to you by your health care provider. Make sure you discuss any questions you have with your health care provider.     Document Released: 07/15/2015 Document Reviewed: 07/15/2015  Elsevier Interactive Patient Education ©2016 Elsevier Inc.

## 2018-02-03 NOTE — ED TRIAGE NOTES
"Chief Complaint   Patient presents with   • Dressing Change     tracheostomy secondary to mucus production     Blood pressure 111/60, pulse 72, temperature 36.6 °C (97.8 °F), resp. rate 17, height 1.956 m (6' 5\"), weight (!) 133.1 kg (293 lb 6.9 oz), SpO2 92 %.      Pt came to triage in a wheelchair c/o mucus production around tracheostomy which was placed about a month ago. Pt states the home health RN was supposed to come yesterday for dressing change, wound check but she did not show. Pt states he was hoping the dressing could be changed tonight and the area assessed.  "

## 2018-02-03 NOTE — ED NOTES
"Patient upset after waiting for respiratory supplies.  Explained that the respiratory therapist was going to deliver the inner canula for the patient as it requires a certain size.  Advised them to please wait.  \"this is fucking ridiculous\" his daughter yelled.  Upset.  Explained the concern for possible return as they do not have the proper supplies.  They still declined and asked to leave.    "

## 2018-02-03 NOTE — DISCHARGE PLANNING
"[facility] requested that this SW reach out to the pt and his spouse to inquire about services through [facility]. SW attempted to call the pt and spouse several times prior to pts spouse, Lisa, answering. Lisa stated that she was upset with [facility], and had to \"cool down\" prior to speaking with anyone. Lisa stated that she would like to continue with [facility] for now. SW to continue attempting to reach HH to update them.   "

## 2018-02-03 NOTE — DISCHARGE PLANNING
HH on Call RN rcvd call around 6am from IFRAH Mina in regards to patient Joshua Medrano. The wife is upset we have not been out to see patient in regards to trach care and said we were supposed to have been there since it has been 48 hours. Referral for HH was received 2/1/2018 @ 4:20pm from [facility] with the following reasoning for HH J90 Pleural effusion on left, I48.92 Atrial flutter with rapid ventricular response (CMS-HCC),J96.01 Acute respiratory failure with hypoxia (CMS-HCC).  There was no mention of the trach in the referral. HH does typically see patients within 48 hours and tends to prioritize those patient with urgent medical needs. If HH had been aware of the patient having a trach we would have been out to see asap. There was an attempt made to call the wife @ 8 am on 2/3/2018 to schedule a HH nurse to come out and see the patient. She then told me to “Fuck Off” and she didn’t want to “fucking talk to us”. At this point we will not be able to send a nurse out without consent. Called to notify Pati (IFRAH) that an attempt was made but she had left for the morning. I did speak to the SW (Isabella) who said she would attempt to find the patient. Per notes in the chart patient wife was adamant about leaving. Did not want to wait for respiratory to come bring her the correct inner cannula even after being advised to wait.

## 2018-02-03 NOTE — ED PROVIDER NOTES
ED Provider Note    Scribed for Karina Medina M.D. by Sandi Clement. 2/3/2018, 5:42 AM.    Primary care provider: Pcp Pt States None  Means of arrival: Walk in  History obtained from: Patient and Patient's Wife  History limited by: None    CHIEF COMPLAINT  Dressing Change    HPI  Joshua Medrano is a 52 y.o. male who presents to the Emergency Department for a dressing change to his tracheostomy. The patient reports he woke in the middle of the night with a cough and shortness of breath. He states home health was suppose to come yesterday to clean the tracheostomy, but they did not show up. The patient states the trach was placed due to prolonged intubation. He was discharged from the hospital 2 days ago. The patient denies symptoms of fever.     REVIEW OF SYSTEMS  Pertinent positives include cough and shortness of breath. Pertinent negatives include no fever.    E.    PAST MEDICAL HISTORY   has a past medical history of ASTHMA; Chronic obstructive pulmonary disease (CMS-[facility]); and Diabetes.    SURGICAL HISTORY   has a past surgical history that includes other abdominal surgery; multiple coronary artery bypass endo vein harvest (12/22/2017); oscar (12/22/2017); and thoracoscopy (Left, 1/25/2018).    SOCIAL HISTORY  Social History   Substance Use Topics   • Smoking status: Former Smoker     Packs/day: 0.50     Years: 20.00     Types: Cigarettes     Quit date: 12/13/2017   • Smokeless tobacco: Never Used   • Alcohol use No      History   Drug Use No       FAMILY HISTORY  History reviewed. No pertinent family history.    CURRENT MEDICATIONS  Home Medications     Reviewed by Lisa Rasmussen R.N. (Registered Nurse) on 02/03/18 at 0448  Med List Status: Partial   Medication Last Dose Status   albuterol 108 (90 Base) MCG/ACT Aero Soln inhalation aerosol  Active   amiodarone (CORDARONE) 200 MG Tab  Active   aspirin (ASA) 81 MG Chew Tab chewable tablet  Active   digoxin (LANOXIN) 125 MCG Tab  Active   furosemide (LASIX) 40 MG  "Tab  Active   ipratropium-albuterol (DUONEB) 0.5-2.5 (3) MG/3ML nebulizer solution  Active   lisinopril (PRINIVIL) 10 MG Tab  Active   nystatin (MYCOSTATIN) 546723 UNIT/ML Suspension  Active   rosuvastatin (CRESTOR) 40 MG tablet  Active   spironolactone (ALDACTONE) 50 MG Tab  Active   warfarin (COUMADIN) 5 MG Tab  Active                ALLERGIES  Allergies   Allergen Reactions   • Erythromycin Anaphylaxis   • Cillins [Penicillins] Rash     As a child, tolerated cefepime (12/2017), ceftriaxone (1/2018), cefazolin (12/2017)   • Shellfish Allergy Anaphylaxis     Pt stated that he is allergic to all seafood, will develop a rash and says that rash will clear up with benadryl       PHYSICAL EXAM  Vital Signs: /60   Pulse 72   Temp 36.6 °C (97.8 °F)   Resp 17   Ht 1.956 m (6' 5\")   Wt (!) 133.1 kg (293 lb 6.9 oz)   SpO2 92%   BMI 34.80 kg/m²   Constitutional: Alert in no apparent distress.  HEENT: Atraumatic. Tracheostomy in place with passing air valve. No inner canula present.   Cardiac: Normal rate, normal peripheral perfusion  Pulmonary: Normal work of breathing, no tachypnea  Abdomen:  Non-distended  Skin: Visualized skin is dry. No rashes or lesions.   Extremities:   No edema, No asymmetry  Neurologic: Alert, normal speech, normal gross motor function  Psychiatric: Affect and Mood normal    COURSE & MEDICAL DECISION MAKING  Pertinent Labs & Imaging studies reviewed. (See chart for details)    5:42 AM - Patient seen and examined at bedside. He was informed he would be assisted with helping clean his tracheostomy tube.    Decision Making:  This is a 52 y.o. year old male who presents with concerns regarding tracheostomy care. He was recently discharged home from the hospital, however was not provided with tracheostomy care teaching nor equipment. He was concerned because the trach was becoming dirty and clogged with secretions. On arrival to the emergency department, our nursing staff cleaned and suctioned " the trach, it appear to be functioning well with no surrounding skin break down, and no evidence of bleeding.      On inspection, there is no inner cannula present. Family member states it was thrown away by staff accidentally prior to discharge. She was provided with teaching and a replacement inner cannula. Social work was consulted, they were able to get in touch with the home health company and will follow up to make certain he will be able to access further services.     The patient will return for new or worsening symptoms and is stable at the time of discharge.    DISPOSITION:  Patient will be discharged home in stable condition.    FOLLOW UP:  [address] [address]  [phone]    please keep all scheduled follow-up appointments. You should be contacted by [facility] today or tomorrow for recheck.    [facility], Emergency Dept  [address] [address]  [phone]  Go to  If symptoms worsen      OUTPATIENT MEDICATIONS:  Discharge Medication List as of 2/3/2018  9:39 AM            FINAL IMPRESSION  1. Tracheostomy malfunction (CMS-[facility])          Sandi LARA (Fernando), am scribing for, and in the presence of, Karina Medina M.D..    Electronically signed by: Sandi Clement (Fernando), 2/3/2018    Karina LARA M.D. personally performed the services described in this documentation, as scribed by Sandi Clement in my presence, and it is both accurate and complete.    The note accurately reflects work and decisions made by me.  Karina Medina  2/9/2018  10:00 AM

## 2018-02-04 ENCOUNTER — HOME CARE VISIT (OUTPATIENT)
Dept: HOME HEALTH SERVICES | Facility: HOME HEALTHCARE | Age: 53
End: 2018-02-04
Payer: MEDICARE

## 2018-02-04 VITALS
RESPIRATION RATE: 18 BRPM | WEIGHT: 290.6 LBS | SYSTOLIC BLOOD PRESSURE: 108 MMHG | OXYGEN SATURATION: 94 % | TEMPERATURE: 98.2 F | BODY MASS INDEX: 35.39 KG/M2 | HEART RATE: 62 BPM | HEIGHT: 76 IN | DIASTOLIC BLOOD PRESSURE: 62 MMHG

## 2018-02-04 PROCEDURE — G0493 RN CARE EA 15 MIN HH/HOSPICE: HCPCS

## 2018-02-04 PROCEDURE — 665998 HH PPS REVENUE CREDIT

## 2018-02-04 PROCEDURE — 665999 HH PPS REVENUE DEBIT

## 2018-02-04 PROCEDURE — 665001 SOC-HOME HEALTH

## 2018-02-04 SDOH — ECONOMIC STABILITY: HOUSING INSECURITY: UNSAFE APPLIANCES: 0

## 2018-02-04 SDOH — ECONOMIC STABILITY: HOUSING INSECURITY: UNSAFE COOKING RANGE AREA: 0

## 2018-02-04 SDOH — ECONOMIC STABILITY: HOUSING INSECURITY
HOME SAFETY: S HAVE A FIRE ESCAPE PLAN DEVELOPED. PATIENT DOES HAVE FLAMMABLE MATERIALS PRESENT IN THE HOME PRESENTING A FIRE HAZARD (WIFE SMOKES, GOES OUTSIDE, PATIENT QUIT SINCE HOSPITALIZAITON). EVIDENCE FOUND OF SMOKING MATERIALS PRESENT IN THE HOME.

## 2018-02-04 SDOH — ECONOMIC STABILITY: HOUSING INSECURITY
HOME SAFETY: OXYGEN SAFETY RISK ASSESSMENT PERFORMED. PATIENT DOES NOT HAVE A NO SMOKING SIGN POSTED IN THE HOME. PATIENT DOES NOT HAVE A WORKING FIRE EXTINGUISHER PRESENT IN THE HOME. SMOKE ALARMS ARE PRESENT AND FUNCTIONAL ON EACH LEVEL OF THE HOME. PATIENT DOE

## 2018-02-04 ASSESSMENT — ACTIVITIES OF DAILY LIVING (ADL)
HOME_HEALTH_OASIS: 01
OASIS_M1830: 03

## 2018-02-04 ASSESSMENT — PATIENT HEALTH QUESTIONNAIRE - PHQ9
2. FEELING DOWN, DEPRESSED, IRRITABLE, OR HOPELESS: 01
1. LITTLE INTEREST OR PLEASURE IN DOING THINGS: 00

## 2018-02-04 ASSESSMENT — ENCOUNTER SYMPTOMS
VOMITING: DENIES
NAUSEA: DENIES

## 2018-02-05 PROCEDURE — 665999 HH PPS REVENUE DEBIT

## 2018-02-05 PROCEDURE — 665998 HH PPS REVENUE CREDIT

## 2018-02-05 NOTE — ED PROVIDER NOTES
ED Provider Note    Scribed for Karina Medina M.D. by Sandi Clement. 2/3/2018, 5:42 AM.    Primary care provider: Pcp Pt States None  Means of arrival: Walk in  History obtained from: Patient and Patient's Wife  History limited by: None    CHIEF COMPLAINT  Dressing Change    HPI  Joshua Medrano is a 52 y.o. male who presents to the Emergency Department for a dressing change to his tracheostomy. The patient reports he woke in the middle of the night with a cough and shortness of breath. He states home health was suppose to come yesterday to clean the tracheostomy, but they did not show up. The patient states the trach was placed due to prolonged intubation. He was discharged from the hospital 2 days ago. The patient denies symptoms of fever.     REVIEW OF SYSTEMS  Pertinent positives include cough and shortness of breath. Pertinent negatives include no fever.    E.    PAST MEDICAL HISTORY   has a past medical history of ASTHMA; Chronic obstructive pulmonary disease (CMS-[facility]); and Diabetes.    SURGICAL HISTORY   has a past surgical history that includes other abdominal surgery; multiple coronary artery bypass endo vein harvest (12/22/2017); oscar (12/22/2017); and thoracoscopy (Left, 1/25/2018).    SOCIAL HISTORY  Social History   Substance Use Topics   • Smoking status: Former Smoker     Packs/day: 0.50     Years: 20.00     Types: Cigarettes     Quit date: 12/13/2017   • Smokeless tobacco: Never Used   • Alcohol use No      History   Drug Use No       FAMILY HISTORY  History reviewed. No pertinent family history.    CURRENT MEDICATIONS  Home Medications     Reviewed by Lisa Rasmussen R.N. (Registered Nurse) on 02/03/18 at 0448  Med List Status: Partial   Medication Last Dose Status   albuterol 108 (90 Base) MCG/ACT Aero Soln inhalation aerosol  Active   amiodarone (CORDARONE) 200 MG Tab  Active   aspirin (ASA) 81 MG Chew Tab chewable tablet  Active   digoxin (LANOXIN) 125 MCG Tab  Active   furosemide (LASIX) 40 MG  "Tab  Active   ipratropium-albuterol (DUONEB) 0.5-2.5 (3) MG/3ML nebulizer solution  Active   lisinopril (PRINIVIL) 10 MG Tab  Active   nystatin (MYCOSTATIN) 318112 UNIT/ML Suspension  Active   rosuvastatin (CRESTOR) 40 MG tablet  Active   spironolactone (ALDACTONE) 50 MG Tab  Active   warfarin (COUMADIN) 5 MG Tab  Active                ALLERGIES  Allergies   Allergen Reactions   • Erythromycin Anaphylaxis   • Cillins [Penicillins] Rash     As a child, tolerated cefepime (12/2017), ceftriaxone (1/2018), cefazolin (12/2017)   • Shellfish Allergy Anaphylaxis     Pt stated that he is allergic to all seafood, will develop a rash and says that rash will clear up with benadryl       PHYSICAL EXAM  Vital Signs: /60   Pulse 72   Temp 36.6 °C (97.8 °F)   Resp 17   Ht 1.956 m (6' 5\")   Wt (!) 133.1 kg (293 lb 6.9 oz)   SpO2 92%   BMI 34.80 kg/m²   Constitutional: Alert in no apparent distress.  HEENT: Atraumatic. Tracheostomy in place with Passy-Laporte valve in place. No inner canula present.   Cardiac: Normal rate  Pulmonary: Normal work of breathing, no tachypnea, breath sounds equal bilaterally  Skin: Visualized skin is dry. No rashes or lesions. No skin break down surrounding tracheostomy  Neurologic: Alert, normal speech, normal gross motor function  Psychiatric: Affect and Mood normal    COURSE & MEDICAL DECISION MAKING  Pertinent Labs & Imaging studies reviewed. (See chart for details)    5:42 AM - Patient seen and examined at bedside. He was informed he would be assisted with helping clean his tracheostomy tube.    Decision Making:  This is a 52 y.o. year old male who presents with concerns regarding tracheostomy care. He was recently discharged home from the hospital, however was not provided with tracheostomy care teaching nor equipment. He was concerned because the trach was becoming dirty and clogged with secretions. On arrival to the emergency department, our nursing staff cleaned and suctioned the " trach, it appear to be functioning well with no surrounding skin break down, and no evidence of bleeding.     On inspection, there is no inner cannula present. Family member states it was thrown away by staff accidentally prior to discharge. She was provided with teaching and a replacement inner cannula. Social work was consulted, they were able to get in touch with the home health company and will follow up to make ur    The patient will return for new or worsening symptoms and is stable at the time of discharge.    DISPOSITION:  Patient will be discharged home in stable condition.    FOLLOW UP:  No follow-up provider specified.    OUTPATIENT MEDICATIONS:  New Prescriptions    No medications on file         FINAL IMPRESSION  No diagnosis found.      Sandi LARA (Scribe), am scribing for, and in the presence of, Karina Medina M.D..    Electronically signed by: Sandi Clement (Gopiibkaye), 2/3/2018    Karina LARA M.D. personally performed the services described in this documentation, as scribed by Sandi Clement in my presence, and it is both accurate and complete.    {ERP Attestation (ERP ONLY):[id number]}

## 2018-02-06 ENCOUNTER — HOSPITAL ENCOUNTER (INPATIENT)
Facility: MEDICAL CENTER | Age: 53
LOS: 2 days | DRG: 291 | End: 2018-02-08
Attending: EMERGENCY MEDICINE | Admitting: HOSPITALIST
Payer: MEDICARE

## 2018-02-06 ENCOUNTER — RESOLUTE PROFESSIONAL BILLING HOSPITAL PROF FEE (OUTPATIENT)
Dept: HOSPITALIST | Facility: MEDICAL CENTER | Age: 53
End: 2018-02-06
Payer: MEDICARE

## 2018-02-06 ENCOUNTER — APPOINTMENT (OUTPATIENT)
Dept: RADIOLOGY | Facility: MEDICAL CENTER | Age: 53
DRG: 291 | End: 2018-02-06
Payer: MEDICARE

## 2018-02-06 ENCOUNTER — TELEPHONE (OUTPATIENT)
Dept: VASCULAR LAB | Facility: MEDICAL CENTER | Age: 53
End: 2018-02-06

## 2018-02-06 ENCOUNTER — HOME CARE VISIT (OUTPATIENT)
Dept: HOME HEALTH SERVICES | Facility: HOME HEALTHCARE | Age: 53
End: 2018-02-06
Payer: MEDICARE

## 2018-02-06 VITALS
TEMPERATURE: 98 F | WEIGHT: 299.2 LBS | BODY MASS INDEX: 36.42 KG/M2 | OXYGEN SATURATION: 98 % | RESPIRATION RATE: 18 BRPM | SYSTOLIC BLOOD PRESSURE: 120 MMHG | HEART RATE: 84 BPM | DIASTOLIC BLOOD PRESSURE: 62 MMHG

## 2018-02-06 DIAGNOSIS — E87.0 HYPERNATREMIA: ICD-10-CM

## 2018-02-06 DIAGNOSIS — J96.01 ACUTE RESPIRATORY FAILURE WITH HYPOXIA (HCC): ICD-10-CM

## 2018-02-06 DIAGNOSIS — I50.22 CHRONIC SYSTOLIC CHF (CONGESTIVE HEART FAILURE), NYHA CLASS 4 (HCC): ICD-10-CM

## 2018-02-06 DIAGNOSIS — D64.9 NORMOCYTIC ANEMIA: ICD-10-CM

## 2018-02-06 DIAGNOSIS — I48.92 ATRIAL FLUTTER WITH RAPID VENTRICULAR RESPONSE (HCC): ICD-10-CM

## 2018-02-06 DIAGNOSIS — J96.01 ACUTE HYPOXEMIC RESPIRATORY FAILURE (HCC): ICD-10-CM

## 2018-02-06 DIAGNOSIS — I50.20 SYSTOLIC CONGESTIVE HEART FAILURE, UNSPECIFIED CONGESTIVE HEART FAILURE CHRONICITY: ICD-10-CM

## 2018-02-06 DIAGNOSIS — I25.10 CORONARY ARTERY DISEASE INVOLVING NATIVE CORONARY ARTERY OF NATIVE HEART WITHOUT ANGINA PECTORIS: ICD-10-CM

## 2018-02-06 DIAGNOSIS — J90 PLEURAL EFFUSION ON LEFT: ICD-10-CM

## 2018-02-06 PROBLEM — I48.91 ATRIAL FIBRILLATION, CONTROLLED (HCC): Status: ACTIVE | Noted: 2018-02-06

## 2018-02-06 PROBLEM — I50.23 ACUTE ON CHRONIC SYSTOLIC CHF (CONGESTIVE HEART FAILURE) (HCC): Status: ACTIVE | Noted: 2018-02-06

## 2018-02-06 PROBLEM — Z43.0 TRACHEOSTOMY CARE (HCC): Status: ACTIVE | Noted: 2018-02-06

## 2018-02-06 LAB
ALBUMIN SERPL BCP-MCNC: 3.1 G/DL (ref 3.2–4.9)
ALBUMIN/GLOB SERPL: 0.7 G/DL
ALP SERPL-CCNC: 104 U/L (ref 30–99)
ALT SERPL-CCNC: 15 U/L (ref 2–50)
ANION GAP SERPL CALC-SCNC: 8 MMOL/L (ref 0–11.9)
ANISOCYTOSIS BLD QL SMEAR: ABNORMAL
AST SERPL-CCNC: 31 U/L (ref 12–45)
BASOPHILS # BLD AUTO: 0.9 % (ref 0–1.8)
BASOPHILS # BLD: 0.2 K/UL (ref 0–0.12)
BILIRUB SERPL-MCNC: 0.3 MG/DL (ref 0.1–1.5)
BNP SERPL-MCNC: 440 PG/ML (ref 0–100)
BUN SERPL-MCNC: 19 MG/DL (ref 8–22)
CALCIUM SERPL-MCNC: 8.6 MG/DL (ref 8.5–10.5)
CHLORIDE SERPL-SCNC: 105 MMOL/L (ref 96–112)
CO2 SERPL-SCNC: 20 MMOL/L (ref 20–33)
CREAT SERPL-MCNC: 0.89 MG/DL (ref 0.5–1.4)
DIGOXIN SERPL-MCNC: 0.5 NG/ML (ref 0.8–2)
EKG IMPRESSION: NORMAL
EOSINOPHIL # BLD AUTO: 0.59 K/UL (ref 0–0.51)
EOSINOPHIL NFR BLD: 2.7 % (ref 0–6.9)
ERYTHROCYTE [DISTWIDTH] IN BLOOD BY AUTOMATED COUNT: 59.2 FL (ref 35.9–50)
GLOBULIN SER CALC-MCNC: 4.4 G/DL (ref 1.9–3.5)
GLUCOSE BLD-MCNC: 145 MG/DL (ref 65–99)
GLUCOSE SERPL-MCNC: 90 MG/DL (ref 65–99)
HCT VFR BLD AUTO: 27.8 % (ref 42–52)
HGB BLD-MCNC: 7.3 G/DL (ref 14–18)
HYPOCHROMIA BLD QL SMEAR: ABNORMAL
INR PPP: 1.23 (ref 0.87–1.13)
LYMPHOCYTES # BLD AUTO: 0.57 K/UL (ref 1–4.8)
LYMPHOCYTES NFR BLD: 2.6 % (ref 22–41)
MACROCYTES BLD QL SMEAR: ABNORMAL
MANUAL DIFF BLD: NORMAL
MCH RBC QN AUTO: 21 PG (ref 27–33)
MCHC RBC AUTO-ENTMCNC: 26.3 G/DL (ref 33.7–35.3)
MCV RBC AUTO: 80.1 FL (ref 81.4–97.8)
MICROCYTES BLD QL SMEAR: ABNORMAL
MONOCYTES # BLD AUTO: 0.57 K/UL (ref 0–0.85)
MONOCYTES NFR BLD AUTO: 2.6 % (ref 0–13.4)
MORPHOLOGY BLD-IMP: NORMAL
MYELOCYTES NFR BLD MANUAL: 0.9 %
NEUTROPHILS # BLD AUTO: 19.69 K/UL (ref 1.82–7.42)
NEUTROPHILS NFR BLD: 87.7 % (ref 44–72)
NEUTS BAND NFR BLD MANUAL: 2.6 % (ref 0–10)
NRBC # BLD AUTO: 0.04 K/UL
NRBC BLD-RTO: 0.2 /100 WBC
PLATELET # BLD AUTO: 500 K/UL (ref 164–446)
PLATELET BLD QL SMEAR: NORMAL
PMV BLD AUTO: 9.9 FL (ref 9–12.9)
POIKILOCYTOSIS BLD QL SMEAR: NORMAL
POLYCHROMASIA BLD QL SMEAR: NORMAL
POTASSIUM SERPL-SCNC: 4.7 MMOL/L (ref 3.6–5.5)
PROT SERPL-MCNC: 7.5 G/DL (ref 6–8.2)
PROTHROMBIN TIME: 15.2 SEC (ref 12–14.6)
RBC # BLD AUTO: 3.47 M/UL (ref 4.7–6.1)
RBC BLD AUTO: PRESENT
SCHISTOCYTES BLD QL SMEAR: NORMAL
SODIUM SERPL-SCNC: 133 MMOL/L (ref 135–145)
TROPONIN I SERPL-MCNC: 0.03 NG/ML (ref 0–0.04)
WBC # BLD AUTO: 21.8 K/UL (ref 4.8–10.8)

## 2018-02-06 PROCEDURE — 93005 ELECTROCARDIOGRAM TRACING: CPT

## 2018-02-06 PROCEDURE — 700111 HCHG RX REV CODE 636 W/ 250 OVERRIDE (IP): Performed by: EMERGENCY MEDICINE

## 2018-02-06 PROCEDURE — 85027 COMPLETE CBC AUTOMATED: CPT

## 2018-02-06 PROCEDURE — G0299 HHS/HOSPICE OF RN EA 15 MIN: HCPCS

## 2018-02-06 PROCEDURE — 665998 HH PPS REVENUE CREDIT

## 2018-02-06 PROCEDURE — 96374 THER/PROPH/DIAG INJ IV PUSH: CPT

## 2018-02-06 PROCEDURE — 71045 X-RAY EXAM CHEST 1 VIEW: CPT

## 2018-02-06 PROCEDURE — 83880 ASSAY OF NATRIURETIC PEPTIDE: CPT

## 2018-02-06 PROCEDURE — 84484 ASSAY OF TROPONIN QUANT: CPT

## 2018-02-06 PROCEDURE — 80053 COMPREHEN METABOLIC PANEL: CPT

## 2018-02-06 PROCEDURE — 700111 HCHG RX REV CODE 636 W/ 250 OVERRIDE (IP): Performed by: HOSPITALIST

## 2018-02-06 PROCEDURE — 99223 1ST HOSP IP/OBS HIGH 75: CPT | Mod: AI | Performed by: HOSPITALIST

## 2018-02-06 PROCEDURE — 770020 HCHG ROOM/CARE - TELE (206)

## 2018-02-06 PROCEDURE — 93005 ELECTROCARDIOGRAM TRACING: CPT | Performed by: EMERGENCY MEDICINE

## 2018-02-06 PROCEDURE — 87040 BLOOD CULTURE FOR BACTERIA: CPT | Mod: 91

## 2018-02-06 PROCEDURE — 36415 COLL VENOUS BLD VENIPUNCTURE: CPT

## 2018-02-06 PROCEDURE — 99285 EMERGENCY DEPT VISIT HI MDM: CPT

## 2018-02-06 PROCEDURE — 80162 ASSAY OF DIGOXIN TOTAL: CPT

## 2018-02-06 PROCEDURE — 665999 HH PPS REVENUE DEBIT

## 2018-02-06 PROCEDURE — 85007 BL SMEAR W/DIFF WBC COUNT: CPT

## 2018-02-06 PROCEDURE — A9270 NON-COVERED ITEM OR SERVICE: HCPCS | Performed by: HOSPITALIST

## 2018-02-06 PROCEDURE — 82962 GLUCOSE BLOOD TEST: CPT

## 2018-02-06 PROCEDURE — 85610 PROTHROMBIN TIME: CPT

## 2018-02-06 PROCEDURE — 700102 HCHG RX REV CODE 250 W/ 637 OVERRIDE(OP): Performed by: HOSPITALIST

## 2018-02-06 RX ORDER — POLYETHYLENE GLYCOL 3350 17 G/17G
1 POWDER, FOR SOLUTION ORAL
Status: DISCONTINUED | OUTPATIENT
Start: 2018-02-06 | End: 2018-02-08 | Stop reason: HOSPADM

## 2018-02-06 RX ORDER — HEPARIN SODIUM 5000 [USP'U]/ML
5000 INJECTION, SOLUTION INTRAVENOUS; SUBCUTANEOUS EVERY 8 HOURS
Status: DISCONTINUED | OUTPATIENT
Start: 2018-02-06 | End: 2018-02-08 | Stop reason: HOSPADM

## 2018-02-06 RX ORDER — ALBUTEROL SULFATE 90 UG/1
2 AEROSOL, METERED RESPIRATORY (INHALATION) EVERY 6 HOURS PRN
Status: DISCONTINUED | OUTPATIENT
Start: 2018-02-06 | End: 2018-02-08 | Stop reason: HOSPADM

## 2018-02-06 RX ORDER — BISACODYL 10 MG
10 SUPPOSITORY, RECTAL RECTAL
Status: DISCONTINUED | OUTPATIENT
Start: 2018-02-06 | End: 2018-02-08 | Stop reason: HOSPADM

## 2018-02-06 RX ORDER — ASPIRIN 81 MG/1
81 TABLET, CHEWABLE ORAL DAILY
Status: DISCONTINUED | OUTPATIENT
Start: 2018-02-07 | End: 2018-02-08 | Stop reason: HOSPADM

## 2018-02-06 RX ORDER — DEXTROSE MONOHYDRATE 25 G/50ML
25 INJECTION, SOLUTION INTRAVENOUS
Status: DISCONTINUED | OUTPATIENT
Start: 2018-02-06 | End: 2018-02-08 | Stop reason: HOSPADM

## 2018-02-06 RX ORDER — SPIRONOLACTONE 25 MG/1
50 TABLET ORAL DAILY
Status: DISCONTINUED | OUTPATIENT
Start: 2018-02-06 | End: 2018-02-08 | Stop reason: HOSPADM

## 2018-02-06 RX ORDER — POTASSIUM CHLORIDE 1.5 G/1.58G
20 POWDER, FOR SOLUTION ORAL 2 TIMES DAILY
Status: DISCONTINUED | OUTPATIENT
Start: 2018-02-07 | End: 2018-02-07

## 2018-02-06 RX ORDER — AMOXICILLIN 250 MG
2 CAPSULE ORAL 2 TIMES DAILY
Status: DISCONTINUED | OUTPATIENT
Start: 2018-02-06 | End: 2018-02-08 | Stop reason: HOSPADM

## 2018-02-06 RX ORDER — FUROSEMIDE 10 MG/ML
40 INJECTION INTRAMUSCULAR; INTRAVENOUS
Status: DISCONTINUED | OUTPATIENT
Start: 2018-02-06 | End: 2018-02-08 | Stop reason: HOSPADM

## 2018-02-06 RX ORDER — FUROSEMIDE 10 MG/ML
80 INJECTION INTRAMUSCULAR; INTRAVENOUS ONCE
Status: COMPLETED | OUTPATIENT
Start: 2018-02-06 | End: 2018-02-06

## 2018-02-06 RX ORDER — LISINOPRIL 10 MG/1
10 TABLET ORAL DAILY
COMMUNITY
End: 2018-02-12

## 2018-02-06 RX ORDER — ROSUVASTATIN CALCIUM 20 MG/1
40 TABLET, COATED ORAL EVERY EVENING
Status: DISCONTINUED | OUTPATIENT
Start: 2018-02-06 | End: 2018-02-08 | Stop reason: HOSPADM

## 2018-02-06 RX ORDER — DIGOXIN 125 MCG
125 TABLET ORAL DAILY
Status: DISCONTINUED | OUTPATIENT
Start: 2018-02-06 | End: 2018-02-08 | Stop reason: HOSPADM

## 2018-02-06 RX ORDER — LISINOPRIL 5 MG/1
10 TABLET ORAL DAILY
Status: DISCONTINUED | OUTPATIENT
Start: 2018-02-07 | End: 2018-02-08 | Stop reason: HOSPADM

## 2018-02-06 RX ORDER — WARFARIN SODIUM 7.5 MG/1
7.5 TABLET ORAL
Status: COMPLETED | OUTPATIENT
Start: 2018-02-06 | End: 2018-02-06

## 2018-02-06 RX ORDER — AMIODARONE HYDROCHLORIDE 200 MG/1
200 TABLET ORAL DAILY
Status: DISCONTINUED | OUTPATIENT
Start: 2018-02-06 | End: 2018-02-08 | Stop reason: HOSPADM

## 2018-02-06 RX ORDER — CAPTOPRIL 25 MG/1
25 TABLET ORAL DAILY
COMMUNITY
End: 2018-02-06

## 2018-02-06 RX ADMIN — FUROSEMIDE 40 MG: 10 INJECTION, SOLUTION INTRAMUSCULAR; INTRAVENOUS at 21:34

## 2018-02-06 RX ADMIN — SPIRONOLACTONE 50 MG: 25 TABLET, FILM COATED ORAL at 21:25

## 2018-02-06 RX ADMIN — DIGOXIN 125 MCG: 125 TABLET ORAL at 21:24

## 2018-02-06 RX ADMIN — AMIODARONE HYDROCHLORIDE 200 MG: 200 TABLET ORAL at 21:25

## 2018-02-06 RX ADMIN — FUROSEMIDE 80 MG: 10 INJECTION, SOLUTION INTRAMUSCULAR; INTRAVENOUS at 17:26

## 2018-02-06 RX ADMIN — WARFARIN SODIUM 7.5 MG: 7.5 TABLET ORAL at 21:25

## 2018-02-06 RX ADMIN — ROSUVASTATIN CALCIUM 40 MG: 20 TABLET, FILM COATED ORAL at 21:24

## 2018-02-06 RX ADMIN — HEPARIN SODIUM 5000 UNITS: 5000 INJECTION, SOLUTION INTRAVENOUS; SUBCUTANEOUS at 21:33

## 2018-02-06 ASSESSMENT — PAIN SCALES - GENERAL
PAINLEVEL_OUTOF10: 6
PAINLEVEL_OUTOF10: 0

## 2018-02-06 ASSESSMENT — ENCOUNTER SYMPTOMS
NERVOUS/ANXIOUS: 0
DEPRESSION: 0
HEADACHES: 0
NECK PAIN: 0
TINGLING: 0
EYE PAIN: 0
CLAUDICATION: 0
PND: 0
PALPITATIONS: 0
MEMORY LOSS: 0
SPUTUM PRODUCTION: 1
CONSTIPATION: 0
HEMOPTYSIS: 0
VOMITING: 0
SORE THROAT: 0
SHORTNESS OF BREATH: 1
BACK PAIN: 0
ORTHOPNEA: 0
SENSORY CHANGE: 0
SPEECH CHANGE: 0
MYALGIAS: 1
SHORTNESS OF BREATH: T
COUGH: 0
TREMORS: 0
FEVER: 0
NAUSEA: 0
CHILLS: 0
DIZZINESS: 0
BLOOD IN STOOL: 0
BLURRED VISION: 0
STRIDOR: 0
DOUBLE VISION: 0
PHOTOPHOBIA: 0
NAUSEA: DENIES
MENTAL STATUS CHANGE: 0
HEARTBURN: 0
WEAKNESS: 1

## 2018-02-06 ASSESSMENT — COGNITIVE AND FUNCTIONAL STATUS - GENERAL
MOVING FROM LYING ON BACK TO SITTING ON SIDE OF FLAT BED: A LITTLE
SUGGESTED CMS G CODE MODIFIER MOBILITY: CJ
CLIMB 3 TO 5 STEPS WITH RAILING: A LITTLE
MOBILITY SCORE: 22
DRESSING REGULAR LOWER BODY CLOTHING: A LITTLE
SUGGESTED CMS G CODE MODIFIER DAILY ACTIVITY: CI
DAILY ACTIVITIY SCORE: 23

## 2018-02-06 ASSESSMENT — COPD QUESTIONNAIRES
COPD SCREENING SCORE: 5
HAVE YOU SMOKED AT LEAST 100 CIGARETTES IN YOUR ENTIRE LIFE: YES
DURING THE PAST 4 WEEKS HOW MUCH DID YOU FEEL SHORT OF BREATH: SOME OF THE TIME
DO YOU EVER COUGH UP ANY MUCUS OR PHLEGM?: NO/ONLY WITH OCCASIONAL COLDS OR INFECTIONS

## 2018-02-06 ASSESSMENT — LIFESTYLE VARIABLES
EVER_SMOKED: YES
ALCOHOL_USE: NO
EVER_SMOKED: YES

## 2018-02-06 NOTE — ED PROVIDER NOTES
"ED Provider Note    CHIEF COMPLAINT  Chief Complaint   Patient presents with   • Difficulty Breathing     RT came to do therapy; noted 9 lbs weight gain within 2 days, pt reports some difficulty breathing but reports \"its not that bad\"  pt is talking lasix at home.     • Peripheral Edema     bilateral 3+       HPI  Joshua Medrano is a 52 y.o. male who presents for evaluation of increasing shortness of breath and weight gain or leg swelling. The patient has a complex medical historyjust hospitalized and discharged 3 days ago with the following discharge diagnoses:  DISCHARGE DIAGNOSES:  1.  Pleural effusion, status post chest tube drainage.  2.  Hypernatremia, resolved.  3.  Acute hypoxic respiratory failure, resolved.  4.  Atrial fibrillation with rapid ventricular response, resolved.  5.  New York Heart Association class IV congestive heart failure.  6.  Type 2 diabetes.    He has been apparently taking his Lasix at home and reports worsening orthopnea and leg swelling and 9-10 pound weight gain. He specifically denies hemoptysis or high fevers. He has been taking Coumadin as well. During last hospitalization he had an echocardiogram demonstrating the following:  CONCLUSIONS  Left ventricle is severely dilated.  Left ventricular ejection fraction is visually estimated to be 25-30%.  Global hypokinesis with regional variation.  Aortic sclerosis without stenosis.  Mild aortic insufficiency.  Mild mitral regurgitation.  Rhythm appears to be atrial fibrillation or atrial flutter    Patient is followed by cardiology he denies wheezing  REVIEW OF SYSTEMS  See HPI for further details. Positive for orthopnea weight gain and leg swelling All other systems are negative.     PAST MEDICAL HISTORY  Past Medical History:   Diagnosis Date   • ASTHMA    • Chronic obstructive pulmonary disease (CMS-HCC)    • Diabetes    Severe CHF    FAMILY HISTORY  No history of bleeding disorder    SOCIAL HISTORY  Social History     Social " History   • Marital status:      Spouse name: N/A   • Number of children: N/A   • Years of education: N/A     Social History Main Topics   • Smoking status: Former Smoker     Packs/day: 0.50     Years: 20.00     Types: Cigarettes     Quit date: 12/13/2017   • Smokeless tobacco: Never Used   • Alcohol use No   • Drug use: No   • Sexual activity: Not on file     Other Topics Concern   • Not on file     Social History Narrative   • No narrative on file   Former smoker no IV drug use    SURGICAL HISTORY  Past Surgical History:   Procedure Laterality Date   • THORACOSCOPY Left 1/25/2018    Procedure: THORACOSCOPY- PLEURODESIS ;  Surgeon: Chandu Paez M.D.;  Location: SURGERY [facility];  Service: General   • MULTIPLE CORONARY ARTERY BYPASS ENDO VEIN HARVEST  12/22/2017    Procedure: MULTIPLE CORONARY ARTERY BYPASS ENDO VEIN HARVEST X2;  Surgeon: Kiel Ash M.D.;  Location: SURGERY [facility];  Service: Cardiothoracic   • JIM  12/22/2017    Procedure: JIM;  Surgeon: Kiel Ash M.D.;  Location: SURGERY [facility];  Service: Cardiothoracic   • OTHER ABDOMINAL SURGERY         CURRENT MEDICATIONS    Current Facility-Administered Medications:   •  furosemide (LASIX) injection 80 mg, 80 mg, Intravenous, Once, René Alvarez M.D.    Current Outpatient Prescriptions:   •  potassium chloride (KLOR-CON) 20 MEQ Pack, Take 20 mEq by mouth 2 times a day., Disp: , Rfl:   •  amiodarone (CORDARONE) 200 MG Tab, Take 1 Tab by mouth every day., Disp: 30 Tab, Rfl: 0  •  digoxin (LANOXIN) 125 MCG Tab, Take 1 Tab by mouth every day at 6 PM., Disp: 30 Tab, Rfl: 0  •  ipratropium-albuterol (DUONEB) 0.5-2.5 (3) MG/3ML nebulizer solution, 3 mL by Nebulization route every four hours as needed for Shortness of Breath., Disp: 30 Bullet, Rfl: 0  •  rosuvastatin (CRESTOR) 40 MG tablet, Take 1 Tab by mouth every evening., Disp: 30 Tab, Rfl: 0  •  spironolactone (ALDACTONE) 50 MG Tab, Take 1 Tab by mouth every day.,  "Disp: 30 Tab, Rfl: 0  •  furosemide (LASIX) 40 MG Tab, Take 1 Tab by mouth every day., Disp: 30 Tab, Rfl: 0  •  warfarin (COUMADIN) 5 MG Tab, Take 1 Tab by mouth every day., Disp: 30 Tab, Rfl: 0  •  albuterol 108 (90 Base) MCG/ACT Aero Soln inhalation aerosol, Inhale 2 Puffs by mouth every 6 hours as needed for Shortness of Breath., Disp: 1 Inhaler, Rfl: 10  •  lisinopril (PRINIVIL) 10 MG Tab, Take 1 Tab by mouth every day., Disp: 30 Tab, Rfl: 0  •  aspirin (ASA) 81 MG Chew Tab chewable tablet, Take 1 Tab by mouth every day., Disp: 100 Tab, Rfl:   •  nystatin (MYCOSTATIN) 334139 UNIT/ML Suspension, Take 5 mL by mouth 4 times a day., Disp: , Rfl:       ALLERGIES  Allergies   Allergen Reactions   • Erythromycin Anaphylaxis   • Cillins [Penicillins] Rash     As a child, tolerated cefepime (12/2017), ceftriaxone (1/2018), cefazolin (12/2017)   • Shellfish Allergy Anaphylaxis     Pt stated that he is allergic to all seafood, will develop a rash and says that rash will clear up with benadryl       PHYSICAL EXAM  VITAL SIGNS: /58   Pulse 66   Temp 36.7 °C (98.1 °F)   Resp 18   Ht 1.956 m (6' 5\")   Wt (!) 135.3 kg (298 lb 4.5 oz)   SpO2 98%   BMI 35.37 kg/m²  Room air O2: 100    Constitutional: Well developed, Well nourished, No acute distress, Non-toxic appearance.   HENT: Normocephalic, Atraumatic, Bilateral external ears normal, Oropharynx moist, No oral exudates, Nose normal. Tracheostomy noted  Eyes: PERRLA, EOMI, Conjunctiva normal, No discharge.   Neck: Normal range of motion, No tenderness, Supple, No stridor.   Cardiovascular: Normal heart rate, Normal rhythm, No murmurs, No rubs, No gallops.   Thorax & Lungs: Bilateral rales, No chest tenderness.   Abdomen: Bowel sounds normal, Soft, No tenderness, No masses, No pulsatile masses.   Skin: Warm, Dry, No erythema, No rash.   Back: No tenderness, No CVA tenderness.   Extremities: Bilateral lower extremity 4+ pitting edema, No clubbing.   Musculoskeletal: " Good range of motion in all major joints. No tenderness to palpation or major deformities noted.   Neurologic: Alert & oriented x 3, Normal motor function, Normal sensory function, No focal deficits noted.   Psychiatric: Anxious   EKG  Interpretation by me sinus rhythm mild motion artifact PVC noted no acute ST segment elevation or depression or pathological T-wave inversions    RADIOLOGY/PROCEDURES  DX-CHEST-LIMITED (1 VIEW)   Final Result      Status post left chest tube removal. No pneumothorax identified.   Small left pleural effusion again demonstrated.   Diffuse atelectasis/possible edema or pneumonitis left lung.          Results for orders placed or performed during the hospital encounter of 02/06/18   CBC WITH DIFFERENTIAL   Result Value Ref Range    WBC 21.8 (H) 4.8 - 10.8 K/uL    RBC 3.47 (L) 4.70 - 6.10 M/uL    Hemoglobin 7.3 (L) 14.0 - 18.0 g/dL    Hematocrit 27.8 (L) 42.0 - 52.0 %    MCV 80.1 (L) 81.4 - 97.8 fL    MCH 21.0 (L) 27.0 - 33.0 pg    MCHC 26.3 (L) 33.7 - 35.3 g/dL    RDW 59.2 (H) 35.9 - 50.0 fL    Platelet Count 500 (H) 164 - 446 K/uL    MPV 9.9 9.0 - 12.9 fL    Nucleated RBC 0.20 /100 WBC    NRBC (Absolute) 0.04 K/uL    Neutrophils-Polys 87.70 (H) 44.00 - 72.00 %    Lymphocytes 2.60 (L) 22.00 - 41.00 %    Monocytes 2.60 0.00 - 13.40 %    Eosinophils 2.70 0.00 - 6.90 %    Basophils 0.90 0.00 - 1.80 %    Neutrophils (Absolute) 19.69 (H) 1.82 - 7.42 K/uL    Lymphs (Absolute) 0.57 (L) 1.00 - 4.80 K/uL    Monos (Absolute) 0.57 0.00 - 0.85 K/uL    Eos (Absolute) 0.59 (H) 0.00 - 0.51 K/uL    Baso (Absolute) 0.20 (H) 0.00 - 0.12 K/uL    Hypochromia 1+     Anisocytosis 1+     Macrocytosis 1+     Microcytosis 1+    COMP METABOLIC PANEL   Result Value Ref Range    Sodium 133 (L) 135 - 145 mmol/L    Potassium 4.7 3.6 - 5.5 mmol/L    Chloride 105 96 - 112 mmol/L    Co2 20 20 - 33 mmol/L    Anion Gap 8.0 0.0 - 11.9    Glucose 90 65 - 99 mg/dL    Bun 19 8 - 22 mg/dL    Creatinine 0.89 0.50 - 1.40 mg/dL     Calcium 8.6 8.5 - 10.5 mg/dL    AST(SGOT) 31 12 - 45 U/L    ALT(SGPT) 15 2 - 50 U/L    Alkaline Phosphatase 104 (H) 30 - 99 U/L    Total Bilirubin 0.3 0.1 - 1.5 mg/dL    Albumin 3.1 (L) 3.2 - 4.9 g/dL    Total Protein 7.5 6.0 - 8.2 g/dL    Globulin 4.4 (H) 1.9 - 3.5 g/dL    A-G Ratio 0.7 g/dL   ESTIMATED GFR   Result Value Ref Range    GFR If African American >60 >60 mL/min/1.73 m 2    GFR If Non African American >60 >60 mL/min/1.73 m 2   TROPONIN   Result Value Ref Range    Troponin I 0.03 0.00 - 0.04 ng/mL   PROTHROMBIN TIME   Result Value Ref Range    PT 15.2 (H) 12.0 - 14.6 sec    INR 1.23 (H) 0.87 - 1.13   DIGOXIN   Result Value Ref Range    Digoxin 0.5 (L) 0.8 - 2.0 ng/mL   DIFFERENTIAL MANUAL   Result Value Ref Range    Bands-Stabs 2.60 0.00 - 10.00 %    Myelocytes 0.90 %    Manual Diff Status PERFORMED    PERIPHERAL SMEAR REVIEW   Result Value Ref Range    Peripheral Smear Review see below    PLATELET ESTIMATE   Result Value Ref Range    Plt Estimation Increased    MORPHOLOGY   Result Value Ref Range    RBC Morphology Present     Polychromia 1+     Poikilocytosis 1+     Schistocytes 1+    EKG (ER)   Result Value Ref Range    Report       [facility] Emergency Dept.    Test Date:  2018  Pt Name:    JARRETT WHITE                   Department: ER  MRN:        [id number]                      Room:  Gender:     Male                         Technician: [id number]  :        1965                   Requested By:ER TRIAGE PROTOCOL  Order #:    [id number]                    Reading MD: YENNI MCNAIR MD    Measurements  Intervals                                Axis  Rate:       65                           P:          -15  AK:         200                          QRS:        -26  QRSD:       94                           T:          -63  QT:         496  QTc:        516    Interpretive Statements  SINUS RHYTHM  BORDERLINE AV CONDUCTION DELAY  BORDERLINE LEFT AXIS DEVIATION  BORDERLINE R WAVE  PROGRESSION, ANTERIOR LEADS  BORDERLINE REPOLARIZATION ABNORMALITY  PROLONGED QT INTERVAL  Compared to ECG 01/06/2018 10:36:12  Prolonged QT interval now present  Atrial flutter no longer present  Myocardial infarct find ing no longer present  T-wave abnormality no longer present    Electronically Signed On 2-6-2018 16:41:51 PST by RENÉ ALVAREZ MD          COURSE & MEDICAL DECISION MAKING  Pertinent Labs & Imaging studies reviewed. (See chart for details)  Patient presents here with worsening shortness of breath or leg swelling chest x-ray suggestive of fluid overload. Clinically he has 4+ pitting edema he has been compliant with his Lasix but I feel that with his very poor ejection fraction and underlying history he needs to be admitted for aggressive diuresis. The patient has chronic leukocytosis as well as anemia his hemoglobin is 7.3 here which is obviously low but not significantly lower than his discharge hemoglobin and his white count is chronically elevated. Etiology and significance of this is unclear however he very clearly has fluid overload clinically.    FINAL IMPRESSION  1. Acute CHF exacerbation  2. Chronic anemia  3. Chronic leukocytosis    Admission      Electronically signed by: René Alvarez, 2/6/2018 3:51 PM

## 2018-02-06 NOTE — ED TRIAGE NOTES
"Pt ambulated to triage with   Chief Complaint   Patient presents with   • Difficulty Breathing     RT came to do therapy; noted 9 lbs weight gain within 2 days, pt reports some difficulty breathing but reports \"its not that bad\"  pt is talking lasix at home.     • Peripheral Edema     bilateral 3+     Pt had CABG 12/22/17, trach in placed.  Pt reports cough at home, light green sputum.  No fevers at home.  Pt Informed regarding triage process and verbalized understanding to inform triage tech or RN for any changes in condition. Placed in lobby.       "

## 2018-02-07 PROBLEM — Z91.148 NONCOMPLIANCE WITH MEDICATION REGIMEN: Status: ACTIVE | Noted: 2018-02-07

## 2018-02-07 PROBLEM — T17.908A ASPIRATION INTO AIRWAY: Status: ACTIVE | Noted: 2018-02-07

## 2018-02-07 LAB
GLUCOSE BLD-MCNC: 120 MG/DL (ref 65–99)
GLUCOSE BLD-MCNC: 137 MG/DL (ref 65–99)
GLUCOSE BLD-MCNC: 177 MG/DL (ref 65–99)
GLUCOSE BLD-MCNC: 187 MG/DL (ref 65–99)

## 2018-02-07 PROCEDURE — 700102 HCHG RX REV CODE 250 W/ 637 OVERRIDE(OP): Performed by: HOSPITALIST

## 2018-02-07 PROCEDURE — 770020 HCHG ROOM/CARE - TELE (206)

## 2018-02-07 PROCEDURE — 665999 HH PPS REVENUE DEBIT

## 2018-02-07 PROCEDURE — A9270 NON-COVERED ITEM OR SERVICE: HCPCS | Performed by: HOSPITALIST

## 2018-02-07 PROCEDURE — 700111 HCHG RX REV CODE 636 W/ 250 OVERRIDE (IP): Performed by: HOSPITALIST

## 2018-02-07 PROCEDURE — 92610 EVALUATE SWALLOWING FUNCTION: CPT

## 2018-02-07 PROCEDURE — G8997 SWALLOW GOAL STATUS: HCPCS | Mod: CI

## 2018-02-07 PROCEDURE — G8996 SWALLOW CURRENT STATUS: HCPCS | Mod: CJ

## 2018-02-07 PROCEDURE — 82962 GLUCOSE BLOOD TEST: CPT

## 2018-02-07 PROCEDURE — 99233 SBSQ HOSP IP/OBS HIGH 50: CPT | Performed by: HOSPITALIST

## 2018-02-07 PROCEDURE — 665998 HH PPS REVENUE CREDIT

## 2018-02-07 RX ORDER — LIDOCAINE AND PRILOCAINE 25; 25 MG/G; MG/G
CREAM TOPICAL PRN
Status: DISCONTINUED | OUTPATIENT
Start: 2018-02-07 | End: 2018-02-08 | Stop reason: HOSPADM

## 2018-02-07 RX ORDER — LIDOCAINE 50 MG/G
OINTMENT TOPICAL PRN
Status: DISCONTINUED | OUTPATIENT
Start: 2018-02-07 | End: 2018-02-07

## 2018-02-07 RX ORDER — WARFARIN SODIUM 5 MG/1
5 TABLET ORAL
Status: DISCONTINUED | OUTPATIENT
Start: 2018-02-08 | End: 2018-02-08 | Stop reason: HOSPADM

## 2018-02-07 RX ORDER — POTASSIUM CHLORIDE 20 MEQ/1
20 TABLET, EXTENDED RELEASE ORAL 2 TIMES DAILY
Status: DISCONTINUED | OUTPATIENT
Start: 2018-02-07 | End: 2018-02-08 | Stop reason: HOSPADM

## 2018-02-07 RX ORDER — WARFARIN SODIUM 7.5 MG/1
7.5 TABLET ORAL
Status: COMPLETED | OUTPATIENT
Start: 2018-02-07 | End: 2018-02-07

## 2018-02-07 RX ADMIN — SPIRONOLACTONE 50 MG: 25 TABLET, FILM COATED ORAL at 08:41

## 2018-02-07 RX ADMIN — HEPARIN SODIUM 5000 UNITS: 5000 INJECTION, SOLUTION INTRAVENOUS; SUBCUTANEOUS at 21:02

## 2018-02-07 RX ADMIN — STANDARDIZED SENNA CONCENTRATE AND DOCUSATE SODIUM 2 TABLET: 8.6; 5 TABLET, FILM COATED ORAL at 08:41

## 2018-02-07 RX ADMIN — HEPARIN SODIUM 5000 UNITS: 5000 INJECTION, SOLUTION INTRAVENOUS; SUBCUTANEOUS at 05:47

## 2018-02-07 RX ADMIN — AMIODARONE HYDROCHLORIDE 200 MG: 200 TABLET ORAL at 08:41

## 2018-02-07 RX ADMIN — ROSUVASTATIN CALCIUM 40 MG: 20 TABLET, FILM COATED ORAL at 21:02

## 2018-02-07 RX ADMIN — LISINOPRIL 10 MG: 5 TABLET ORAL at 08:40

## 2018-02-07 RX ADMIN — POTASSIUM CHLORIDE 20 MEQ: 1500 TABLET, EXTENDED RELEASE ORAL at 09:00

## 2018-02-07 RX ADMIN — ASPIRIN 81 MG: 81 TABLET, CHEWABLE ORAL at 08:41

## 2018-02-07 RX ADMIN — POTASSIUM CHLORIDE 20 MEQ: 1500 TABLET, EXTENDED RELEASE ORAL at 21:02

## 2018-02-07 RX ADMIN — DIGOXIN 125 MCG: 125 TABLET ORAL at 17:08

## 2018-02-07 RX ADMIN — INSULIN HUMAN 1 UNITS: 100 INJECTION, SOLUTION PARENTERAL at 20:59

## 2018-02-07 RX ADMIN — STANDARDIZED SENNA CONCENTRATE AND DOCUSATE SODIUM 2 TABLET: 8.6; 5 TABLET, FILM COATED ORAL at 21:01

## 2018-02-07 RX ADMIN — FUROSEMIDE 40 MG: 10 INJECTION, SOLUTION INTRAMUSCULAR; INTRAVENOUS at 05:47

## 2018-02-07 RX ADMIN — FUROSEMIDE 40 MG: 10 INJECTION, SOLUTION INTRAMUSCULAR; INTRAVENOUS at 17:07

## 2018-02-07 RX ADMIN — WARFARIN SODIUM 7.5 MG: 7.5 TABLET ORAL at 17:08

## 2018-02-07 RX ADMIN — HEPARIN SODIUM 5000 UNITS: 5000 INJECTION, SOLUTION INTRAVENOUS; SUBCUTANEOUS at 14:34

## 2018-02-07 RX ADMIN — INSULIN HUMAN 1 UNITS: 100 INJECTION, SOLUTION PARENTERAL at 11:48

## 2018-02-07 ASSESSMENT — ENCOUNTER SYMPTOMS
HEADACHES: 0
VOMITING: 0
NERVOUS/ANXIOUS: 1
COUGH: 1
NAUSEA: 0
FEVER: 0
SHORTNESS OF BREATH: 1
DIARRHEA: 0
WHEEZING: 0
CONSTIPATION: 0
CHILLS: 0

## 2018-02-07 ASSESSMENT — PAIN SCALES - GENERAL
PAINLEVEL_OUTOF10: 0

## 2018-02-07 NOTE — DISCHARGE PLANNING
Medical Social Work    Per chart review, pt had home health prior to admit. We will need new face to face and order if pt is to resume HH at d/c.

## 2018-02-07 NOTE — H&P
"Hospital Medicine History and Physical    Date of Service  2/6/2018    Chief Complaint  Chief Complaint   Patient presents with   • Difficulty Breathing     RT came to do therapy; noted 9 lbs weight gain within 2 days, pt reports some difficulty breathing but reports \"its not that bad\"  pt is talking lasix at home.     • Peripheral Edema     bilateral 3+       History of Presenting Illness  52 y.o. male with a past medical history of atrial fibrillation, NYHA Class IV congestive heart failure, last LVEF 25-30%, recently discharged on 2/1/18 after having a course of Atrial fibrillation with rapid ventricular response, CABG done on dec 22, 2017, complicated with development of  MSSA Klebsiella pneumoniae, pleural effusion, drained twice, with reoccurrence warranted pleurodesis, with traehostomy, was medically stabilized and was discharged with decannulation clinic. Thus patient presented 2/6/2018 with symptoms of difficulty breathing over the past 2 days patient also notes 9 pound weight gain despite taking his home dose of Lasix. Hence on admission patient was noted to have +4 pitting edema bilateral lower extremities, with anemia. At this time patient will be admitted for aggressive diuresis. Patient otherwise denies having any chest pain, fevers or chills productive cough or nausea vomiting.        Primary Care Physician  Pcp Pt States None    Consultants  None    Code Status  Code: Full code    Review of Systems  Review of Systems   Constitutional: Positive for malaise/fatigue. Negative for chills and fever.   HENT: Negative for congestion, hearing loss, sore throat and tinnitus.    Eyes: Negative for blurred vision, double vision, photophobia and pain.   Respiratory: Positive for sputum production and shortness of breath. Negative for cough, hemoptysis and stridor.    Cardiovascular: Negative for chest pain, palpitations, orthopnea, claudication and PND.   Gastrointestinal: Negative for blood in stool, " constipation, heartburn, melena, nausea and vomiting.   Genitourinary: Negative for dysuria, frequency and urgency.   Musculoskeletal: Positive for myalgias. Negative for back pain and neck pain.   Neurological: Positive for weakness. Negative for dizziness, tingling, tremors, sensory change, speech change and headaches.   Psychiatric/Behavioral: Negative for depression, memory loss and suicidal ideas. The patient is not nervous/anxious.           Past Medical History  Past Medical History:   Diagnosis Date   • ASTHMA    • Chronic obstructive pulmonary disease (CMS-HCC)    • Diabetes        Surgical History  Past Surgical History:   Procedure Laterality Date   • THORACOSCOPY Left 1/25/2018    Procedure: THORACOSCOPY- PLEURODESIS ;  Surgeon: Chandu Paez M.D.;  Location: SURGERY [facility];  Service: General   • MULTIPLE CORONARY ARTERY BYPASS ENDO VEIN HARVEST  12/22/2017    Procedure: MULTIPLE CORONARY ARTERY BYPASS ENDO VEIN HARVEST X2;  Surgeon: Kiel Ash M.D.;  Location: SURGERY [facility];  Service: Cardiothoracic   • JIM  12/22/2017    Procedure: JIM;  Surgeon: Kiel Ash M.D.;  Location: SURGERY [facility];  Service: Cardiothoracic   • OTHER ABDOMINAL SURGERY         Medications  No current facility-administered medications on file prior to encounter.      Current Outpatient Prescriptions on File Prior to Encounter   Medication Sig Dispense Refill   • potassium chloride (KLOR-CON) 20 MEQ Pack Take 20 mEq by mouth 2 times a day.     • amiodarone (CORDARONE) 200 MG Tab Take 1 Tab by mouth every day. 30 Tab 0   • digoxin (LANOXIN) 125 MCG Tab Take 1 Tab by mouth every day at 6 PM. 30 Tab 0   • ipratropium-albuterol (DUONEB) 0.5-2.5 (3) MG/3ML nebulizer solution 3 mL by Nebulization route every four hours as needed for Shortness of Breath. 30 Bullet 0   • rosuvastatin (CRESTOR) 40 MG tablet Take 1 Tab by mouth every evening. 30 Tab 0   • spironolactone (ALDACTONE) 50 MG Tab Take 1  Tab by mouth every day. 30 Tab 0   • furosemide (LASIX) 40 MG Tab Take 1 Tab by mouth every day. 30 Tab 0   • warfarin (COUMADIN) 5 MG Tab Take 1 Tab by mouth every day. 30 Tab 0   • albuterol 108 (90 Base) MCG/ACT Aero Soln inhalation aerosol Inhale 2 Puffs by mouth every 6 hours as needed for Shortness of Breath. 1 Inhaler 10   • aspirin (ASA) 81 MG Chew Tab chewable tablet Take 1 Tab by mouth every day. 100 Tab        Family History  No family history on file.   Family history reviewed, denies history of diabetes    Social History  Social History   Substance Use Topics   • Smoking status: Former Smoker     Packs/day: 0.50     Years: 20.00     Types: Cigarettes     Quit date: 2017   • Smokeless tobacco: Never Used   • Alcohol use No       Allergies  Allergies   Allergen Reactions   • Erythromycin Anaphylaxis   • Cillins [Penicillins] Rash     As a child, tolerated cefepime (2017), ceftriaxone (2018), cefazolin (2017)   • Shellfish Allergy Anaphylaxis     Pt stated that he is allergic to all seafood, will develop a rash and says that rash will clear up with benadryl        Physical Exam  Laboratory   Hemodynamics  Temp (24hrs), Av.7 °C (98.1 °F), Min:36.7 °C (98.1 °F), Max:36.7 °C (98.1 °F)   Temperature: 36.7 °C (98.1 °F)  Pulse  Av.3  Min: 43  Max: 67 Heart Rate (Monitored): 62  Blood Pressure: 109/58, NIBP: 110/58      Respiratory      Respiration: 12, Pulse Oximetry: 95 %, O2 Daily Delivery Respiratory : Silicone Nasal Cannula     Work Of Breathing / Effort: Mild  RUL Breath Sounds: Diminished, RML Breath Sounds: Diminished, RLL Breath Sounds: Diminished, GENNARO Breath Sounds: Diminished, LLL Breath Sounds: Diminished    Physical Exam   Constitutional: He is oriented to person, place, and time. He appears well-developed and well-nourished.   HENT:   Head: Normocephalic and atraumatic.   Mouth/Throat: No oropharyngeal exudate.   Eyes: Conjunctivae are normal. Pupils are equal, round, and  reactive to light. Right eye exhibits no discharge. No scleral icterus.   Neck: Neck supple. No JVD present. No thyromegaly present.   Trach collar in place   Cardiovascular: Intact distal pulses.    No murmur heard.  Pulmonary/Chest: Effort normal. No stridor. No respiratory distress. He has no wheezes. He has rales.   Bibasilar rales   Abdominal: Soft. Bowel sounds are normal. He exhibits no distension. There is no tenderness. There is no rebound.   Musculoskeletal: Normal range of motion. He exhibits no edema (+4 pitting edema bilateral lower shin extending up to bilateral thighs).   Neurological: He is alert and oriented to person, place, and time.   Skin: Skin is warm. No erythema.   Psychiatric: He has a normal mood and affect. His behavior is normal. Thought content normal.         Assessment/Plan  Acute on chronic systolic CHF (congestive heart failure) (CMS-HCC)- (present on admission)   Assessment & Plan    Most likely his home medication of Lasix is ineffective, patient has 4+ pitting edema bilateral lower extremities with 9 pound weight gain.  Last echocardiogram last month shows LVEF of 25-30%  We will hold his home dose of Lasix 40 mg and start IV lasix 40mg BID.   We will continue his aldactone.   Strict I's/O's and daily weight.        Atrial fibrillation, controlled (CMS-HCC)- (present on admission)   Assessment & Plan    Controlled continue home dose of amiodarone, digoxin  Continue Coumadin per pharmacy dosing        Anemia- (present on admission)   Assessment & Plan    Chronic, no signs of blood loss.  Will hold off on transfusion at this time.  Check cbc in the am        Leukocytosis- (present on admission)   Assessment & Plan    Chronic, no signs of sepsis may benefit from hematology consultation as an outpatient        Type 2 diabetes mellitus (CMS-HCC)- (present on admission)   Assessment & Plan    Uncontrolled  Continue Insulin-sliding scale, accu-checks and hypoglycemia protocol.  Last a1c:  6.8                I anticipate this patient will require at least two midnights for appropriate medical management, necessitating inpatient admission.    Prophylaxis: warfarin    Recent Labs      02/06/18   1539   WBC  21.8*   RBC  3.47*   HEMOGLOBIN  7.3*   HEMATOCRIT  27.8*   MCV  80.1*   MCH  21.0*   MCHC  26.3*   RDW  59.2*   PLATELETCT  500*   MPV  9.9     Recent Labs      02/06/18   1335   SODIUM  133*   POTASSIUM  4.7   CHLORIDE  105   CO2  20   GLUCOSE  90   BUN  19   CREATININE  0.89   CALCIUM  8.6     Recent Labs      02/06/18   1335   ALTSGPT  15   ASTSGOT  31   ALKPHOSPHAT  104*   TBILIRUBIN  0.3   GLUCOSE  90     Recent Labs      02/06/18   1534   INR  1.23*     Recent Labs      02/06/18   1539   BNPBTYPENAT  440*         Lab Results   Component Value Date    TROPONINI 0.03 02/06/2018       Imaging  DX-CHEST-LIMITED (1 VIEW)   Final Result      Status post left chest tube removal. No pneumothorax identified.   Small left pleural effusion again demonstrated.   Diffuse atelectasis/possible edema or pneumonitis left lung.

## 2018-02-07 NOTE — CONSULTS
"CC: tracheostomy decannulation     HPI:  52 Y old gentleman with hx of Afib, Heart failure with reduced EF, hx of CABG done in December 2017,admitted here due to heart failure exacerbation. The patient underwent CABG on 12/22/2017 and His hospital course was complicated by development of an MSSA and Klebsiella pneumoniae.  He had left sided pleural effusion which was drained on 2 occasions and underwent pleurodesis. He has been on tracheostomy since 12/31. The patient had been on intermittent capping of tracheostomy tube during night and speech valve applied during day time for the last few weeks, he is supposed to get decannulation today as outpatient. A cuffless fenestrated shiley No 6 tracheostomy tube was changed yesterday and capped.    The  Patient has tolerated the capping since yesterday and he is comfortable, not in respiratory distress, he can vocalize well.    Examination :    /50   Pulse 67   Temp 36.5 °C (97.7 °F)   Resp 18   Ht 1.956 m (6' 5\")   Wt (!) 133.4 kg (294 lb 1.5 oz)   SpO2 96%   BMI 34.87 kg/m²      Not in acute distress, lying comfortable in his bed  Neck: tracheostomy site is clear, capped.  Chest: bilateral breath sounds audible, no crackles or wheezes  Heart: S1, and S2, no murmur or rub, no gallop appreciated.  Abdomen is soft, no tenderness  Extremities: bilateral leg swelling, pitting, 3 + up to knees      Assessment and Plan      The patient has been on tracheostomy tube since 12/31 and now the tracheostomy is capped for more than 24 hours,  His breathing and verbalization is normal, his CXR showed cardiomegaly and minimal left side pleural effusion which will not prevent from removal of tracheostomy tube. So it is okay to remove tracheostomy tube at this time. We advised also shave the neck area adjacent to tracheostomy opening before decannulation since hair can pass through opening and cause infection. We discussed this with the patient and he understood that. " Respiratory technician has been advised about decannulation. The patient's condition has been discussed with RN.         .

## 2018-02-07 NOTE — PROGRESS NOTES
Patient brought up from ED. Placed on monitor and monitor room notified. Patient declines pain at the moment. Patient placed on , saturations are 95+ on room air. Patient has 2L O2 for comfort.  Call light within reach and patient told to call when in need of assistance.

## 2018-02-07 NOTE — DISCHARGE PLANNING
Transitional Care Navigator:    Met with the patient, who was on service with [facility]  prior to admission.  Pt is agreeable to resuming service once he is discharged.  Choice form completed and faxed; SW is aware.

## 2018-02-07 NOTE — PROGRESS NOTES
[facility] Hospitalist Progress Note    Date of Service: 2018    Chief Complaint  52 y.o. male admitted 2018 with shortness of breath and likely aspiration and noncompliance    Interval Problem Update  : Consulted pulm [facility] for trach removal. Fees ordered for tomorrow. Patient refused labs but will have them done at midnight. ordered home health    Disposition  Pending improvement of hypoxia, resume home health on discharge.     Review of Systems   Constitutional: Negative for chills and fever.   Respiratory: Positive for cough and shortness of breath. Negative for wheezing.    Cardiovascular: Negative for chest pain.   Gastrointestinal: Negative for constipation, diarrhea, nausea and vomiting.   Genitourinary: Negative for dysuria.   Neurological: Negative for headaches.   Psychiatric/Behavioral: The patient is nervous/anxious.       Physical Exam  Laboratory/Imaging   Hemodynamics  Temp (24hrs), Av.4 °C (97.6 °F), Min:36.2 °C (97.1 °F), Max:36.6 °C (97.9 °F)   Temperature: 36.5 °C (97.7 °F)  Pulse  Av.7  Min: 43  Max: 70 Heart Rate (Monitored): 62  Blood Pressure: 101/50, NIBP: 110/58      Respiratory      Respiration: 18, Pulse Oximetry: 96 %, O2 Daily Delivery Respiratory : Room Air with O2 Available     Work Of Breathing / Effort: Mild  RUL Breath Sounds: Diminished, RML Breath Sounds: Diminished, RLL Breath Sounds: Diminished, GENNARO Breath Sounds: Diminished, LLL Breath Sounds: Diminished    Fluids    Intake/Output Summary (Last 24 hours) at 18 1420  Last data filed at 18 1305   Gross per 24 hour   Intake             1080 ml   Output             3550 ml   Net            -2470 ml       Nutrition  Orders Placed This Encounter   Procedures   • DIET ORDER     Standing Status:   Standing     Number of Occurrences:   1     Order Specific Question:   Diet:     Answer:   Regular [1]     Order Specific Question:   Texture/Fiber modifications:     Answer:   Dysphagia 2(Pureed/Chopped)specify  fluid consistency(question 6) [2]     Order Specific Question:   Consistency/Fluid modifications:     Answer:   Nectar Thick [2]     Physical Exam   Constitutional: He appears well-developed.   HENT:   Head: Normocephalic.   Neck:   Trach in place   Cardiovascular: Normal rate.  Exam reveals no gallop.    Pulmonary/Chest: No respiratory distress. He has no wheezes. He has rales.   Abdominal: He exhibits no distension. There is no tenderness.   Neurological: He is alert.       Recent Labs      02/06/18   1539   WBC  21.8*   RBC  3.47*   HEMOGLOBIN  7.3*   HEMATOCRIT  27.8*   MCV  80.1*   MCH  21.0*   MCHC  26.3*   RDW  59.2*   PLATELETCT  500*   MPV  9.9     Recent Labs      02/06/18   1335   SODIUM  133*   POTASSIUM  4.7   CHLORIDE  105   CO2  20   GLUCOSE  90   BUN  19   CREATININE  0.89   CALCIUM  8.6     Recent Labs      02/06/18   1534   INR  1.23*     Recent Labs      02/06/18   1539   BNPBTYPENAT  440*              Assessment/Plan     Acute on chronic systolic CHF (congestive heart failure) (CMS-HCC)- (present on admission)   Assessment & Plan    Most likely his home medication of Lasix is ineffective, patient has 4+ pitting edema bilateral lower extremities with 9 pound weight gain.  Last echocardiogram last month shows LVEF of 25-30%  We will hold his home dose of Lasix 40 mg and start IV lasix 40mg BID.   We will continue his aldactone.   Strict I's/O's and daily weight.        Aspiration into airway- (present on admission)   Assessment & Plan    - Previously had aspiration on FEES  - Patient and wife in denial  - Will repeat fees after trach removal          Noncompliance with medication regimen- (present on admission)   Assessment & Plan    - Patient admitted to nursing manager that he was not taking Lasix at home        Tracheostomy care (CMS-HCC)- (present on admission)   Assessment & Plan    - Consulted pulmonary [facility] for trach removal        Atrial fibrillation, controlled (CMS-HCC)- (present on  admission)   Assessment & Plan    Controlled continue home dose of amiodarone, digoxin  Continue Coumadin per pharmacy dosing        Anemia- (present on admission)   Assessment & Plan    Chronic, no signs of blood loss.  Will hold off on transfusion at this time.  Continue to monitor        Leukocytosis- (present on admission)   Assessment & Plan    Chronic, no signs of sepsis may benefit from hematology consultation as an outpatient        Type 2 diabetes mellitus (CMS-[facility])- (present on admission)   Assessment & Plan    Uncontrolled  Continue Insulin-sliding scale, accu-checks and hypoglycemia protocol.  Last a1c: 6.8                Reviewed items::  Labs reviewed and Medications reviewed  Ceballos catheter::  No Ceballos  DVT prophylaxis pharmacological::  Heparin

## 2018-02-07 NOTE — DISCHARGE PLANNING
Medical Social Work    Per IDT rounds, pt will likely d/c home with HH when medically clear. Pt will have trach removed tomorrow and then have FEES done.

## 2018-02-07 NOTE — ED NOTES
Per Speech noted. Pt was on dysphagia II with nectar thick and close observation during intake. RN has continued that diet per admitting MD order.   Nectar tick juice, applesauce and mashed potatoes with a meatloaf provided. PT has been encouraged to chew until food is very soft.

## 2018-02-07 NOTE — ED NOTES
Spoke to Dr. Harrington regard diet. PT was on soft diet with nectar thick fluids at home. Ok to resume that diet.

## 2018-02-07 NOTE — ED NOTES
Dressing changes to right chest, 2 puncture sites from previous chest tubes. No s/s of infection.   New IV established.

## 2018-02-07 NOTE — DIETARY
Nutrition Services:  Unplanned wt loss    Unplanned wt loss on Nutrition Admit Screen. Pt is currently on a Regular, Dysphagia 2, Nectar thick diet and no meals recorded in chart to assess PO. Ht: 195.6 cm, Wt: 133.4 kg, BMI 34.87.  Spoke with pt at bedside.  Pt reports no problems with his appetite.  Pt states recent wt loss of 4 kg (9 lbs) in 2 days.  Pt unsure of UBW as he has had intentional wt loss recently.  Per chart review, pt wt on 2/3 was 133.1 kg (293 lbs).  No wt loss noted from current wt.  Added snacks to daily menu per pt request.  Consult RD as needed. RD will re-screen weekly.      RD available PRN.

## 2018-02-07 NOTE — DISCHARGE PLANNING
Received choice form from [facility] for HH. Notified IFRAH Del Toro that we will need a HH order before the referral can be sent.

## 2018-02-07 NOTE — CARE PLAN
Problem: Infection  Goal: Will remain free from infection    Intervention: Assess signs and symptoms of infection  Patient will verbalize signs and symptoms of infection, interventions that can prevent infection, and when to report signs and symptoms to a healthcare provider.       Problem: Knowledge Deficit  Goal: Knowledge of disease process/condition, treatment plan, diagnostic tests, and medications will improve    Intervention: Assess knowledge level of disease process/condition, treatment plan, diagnostic tests, and medications  Knowledge of disease process, current condition, plan of care, medications, and diagnostics will improve.

## 2018-02-07 NOTE — DISCHARGE PLANNING
Abdiel afternoon and thank you for sending this referral,  If the patient DC's prior to the 24 hour admission roshni we will not need this Resumption of Care and will be able to continue to see the patient as usual, however if he stays inpatient past 3pm today we will need to utilize this resumption of care.  We will hold off on processing this referral in order to see if he reaches the 24 hour roshni.  Thank you!

## 2018-02-07 NOTE — PROGRESS NOTES
2 RN skin check performed with Jojo COPELAND.    Patient's ears are red and blanching, offloaded with oxymask. Patients elbows blanching. Sacrum blanching. Heels and toes heavily calloused, but blanching. 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 Scattered bruising on patient's abdomen from previous hospitalization.

## 2018-02-07 NOTE — THERAPY
"Speech Language Therapy Clinical Swallow Evaluation completed.  Functional Status: Patient seen for swallow evaluation and asking for water.  Patient with Shiley #6 cuffless trach that his currently capped with saturations at 94% on room air.  He was discharged from hospital on 2/1/18 with trach in place and capped as well. Patient with recent history of dysphagia with FEES on 1/29/18 revealing mild to moderate oropharyngeal dysphagia with aspiration and penetration. Per notes, he did have aspiration on 100% of thin liquid trials. Patient was cleared for dysphagia II diet with NTL on 2/1/18 prior to DC home with wife.  Today, patient reports that he has been thickening liquids at home sometimes, but has also been drinking thin liquids at home \"sometimes to clear my mouth.\"   Patient and wife report that patient was scheduled for a pulmonology appointment today to have trach removed, but wound up in the hospital instead. Presentation of PO consisted of ice, nectars, purees, and dysphagia II solids with 2 single sips of water.  Patient had delayed throat clearing X2 on larger boluses of soft solids, but no other overt S/Sx of aspiration noted on NTL, purees or dys II.  On thin liquids, he had delayed throat clearing and delayed coughing which is consistent with possible penetration/aspiration.  Extensive education provided regarding aspiration risk and need for repeat FEES prior to clearing patient for thin liquids. Patient and wife reporting that no education was provided to them regarding aspiration risk on thin liquids; however, prior SLP notes reveal extensive education regarding aspiration and need for thickened liquids at home, which both wife and patient report they were aware of and doing.  At this juncture, patient continues to present with oropharyngeal dysphagia and given history, concerns for aspiration and chest x-ray from 2/6 showing\" Small left pleural effusion again demonstrated. Diffuse " "atelectasis/possible edema or pneumonitis left lung,\" would not recommend diet advancement until repeat FEES is completed.  Furthermore, if decannulation is a possibility, would consider decannulation prior to FEES.  Will plan for FEES tomorrow.  Until FEES is completed, would recommend continuation of dysphagia II with NECTAR THICK LIQUIDS ONLY as previously recommended.  SLP will follow.      Recommendations - Diet:  Dysphagia II diet with nectar thick liquids only.  NO THIN LIQUIDS                          Strategies: Monitor during meals, No Straws and Head of Bed at 90 Degrees                          Medication Administration:    Plan of Care: Will benefit from Speech Therapy 3 times per week  Post-Acute Therapy: Discharge to home with outpatient or home health for additional skilled therapy services.    See \"Rehab Therapy-Acute\" Patient Summary Report for complete documentation.   "

## 2018-02-07 NOTE — ASSESSMENT & PLAN NOTE
- Previously had aspiration on FEES  - Patient and wife in denial  - Will repeat fees after trach removal

## 2018-02-07 NOTE — FACE TO FACE
Face to Face Supporting Documentation - Home Health    The encounter with this patient was in whole or in part the primary reason for home health admission.    Date of encounter:   Patient:                    MRN:                       YOB: 2020  Joshua Medrano  [id number]  1965     Home health to see patient for:  Skilled Nursing care for assessment, interventions & education, Physical Therapy evaluation and treatment and Occupational therapy evaluation and treatment    Skilled need for:  Exacerbation of Chronic Disease State CHF    Skilled nursing interventions to include:  Comment: n/a    Homebound status evidenced by:  Needs the assistance of another person in order to leave the home. Leaving home requires a considerable and taxing effort. There is a normal inability to leave the home.    Community Physician to provide follow up care: Pcp Pt States None     Optional Interventions? No      I certify the face to face encounter for this home health care referral meets the CMS requirements and the encounter/clinical assessment with the patient was, in whole, or in part, for the medical condition(s) listed above, which is the primary reason for home health care. Based on my clinical findings: the service(s) are medically necessary, support the need for home health care, and the homebound criteria are met.  I certify that this patient has had a face to face encounter by myself.  Eliezer Noble M.D. - NPI: [id number]

## 2018-02-07 NOTE — ASSESSMENT & PLAN NOTE
Uncontrolled  Continue Insulin-sliding scale, accu-checks and hypoglycemia protocol.  Last a1c: 6.8

## 2018-02-07 NOTE — ASSESSMENT & PLAN NOTE
Most likely his home medication of Lasix is ineffective, patient has 4+ pitting edema bilateral lower extremities with 9 pound weight gain.  Last echocardiogram last month shows LVEF of 25-30%  We will hold his home dose of Lasix 40 mg and start IV lasix 40mg BID.   We will continue his aldactone.   Strict I's/O's and daily weight.

## 2018-02-07 NOTE — DISCHARGE PLANNING
ATTN: Case Management  RE: Referral for Home Health                We would like to take this opportunity to thank you for submitting a referral for your patient to continue care with [facility]. Our skilled team is dedicated to helping all patients recover and gain independence in the home setting.            As of 02/07/2017, we have accepted the above patient into our service. A [facility] clinician will be out to see the patient within 48 hours to conduct our initial visit. If you have any questions or concerns regarding the patient’s transition to Home Health, please do not hesitate to contact us. We are open for referrals 7 days a week from 8AM to 5PM at [phone].      We look forward to collaborating with you,  [facility] Team

## 2018-02-07 NOTE — RESPIRATORY CARE
COPD EDUCATION by COPD CLINICAL EDUCATOR  2/7/2018 at 7:28 AM by Gila Patten     Patient reviewed by COPD education team. Patient does not qualify for COPD program.

## 2018-02-07 NOTE — DOCUMENTATION QUERY
DOCUMENTATION QUERY    PROVIDERS: Please select “Cosign w/ note” to reply to query.    To better represent the severity of illness of your patient, please review the following information and exercise your independent professional judgment in responding to this query.     Atrial fibrillation is documented in the History and Physical. Based upon the clinical findings, risk factors, and treatment, can this diagnosis of atrial fibrillation be further specified?    • Paroxysmal atrial fibrillation  • Persistent atrial fibrillation  • Chronic atrial fibrillation  • Other explanation of clinical findings  • Unable to determine (no explanation for clinical findings)    The medical record reflects the following:   Clinical Findings H&P: atrial fibrillation, controlled    EKG: sinus rhythm, prolonged QT interval   Treatment Continue home dose of amiodarone, digoxin  Continue coumadin per pharmacy dosing   Risk Factors History of atrial fibrillation  Anemia  Acute on chronic systolic CHF   Location within medical record History and Physical and EKG     Thank you,   Vani Gibson RN, BSN  Clinical   [phone]

## 2018-02-08 ENCOUNTER — PATIENT OUTREACH (OUTPATIENT)
Dept: HEALTH INFORMATION MANAGEMENT | Facility: OTHER | Age: 53
End: 2018-02-08

## 2018-02-08 VITALS
WEIGHT: 299.38 LBS | HEIGHT: 77 IN | HEART RATE: 66 BPM | SYSTOLIC BLOOD PRESSURE: 102 MMHG | OXYGEN SATURATION: 95 % | RESPIRATION RATE: 18 BRPM | DIASTOLIC BLOOD PRESSURE: 50 MMHG | TEMPERATURE: 97.7 F | BODY MASS INDEX: 35.35 KG/M2

## 2018-02-08 LAB
ALBUMIN SERPL BCP-MCNC: 2.9 G/DL (ref 3.2–4.9)
ALBUMIN/GLOB SERPL: 0.6 G/DL
ALP SERPL-CCNC: 97 U/L (ref 30–99)
ALT SERPL-CCNC: 16 U/L (ref 2–50)
ANION GAP SERPL CALC-SCNC: 7 MMOL/L (ref 0–11.9)
ANISOCYTOSIS BLD QL SMEAR: ABNORMAL
AST SERPL-CCNC: 17 U/L (ref 12–45)
BASOPHILS # BLD AUTO: 0.9 % (ref 0–1.8)
BASOPHILS # BLD: 0.17 K/UL (ref 0–0.12)
BILIRUB SERPL-MCNC: 0.3 MG/DL (ref 0.1–1.5)
BUN SERPL-MCNC: 18 MG/DL (ref 8–22)
CALCIUM SERPL-MCNC: 8.3 MG/DL (ref 8.5–10.5)
CHLORIDE SERPL-SCNC: 100 MMOL/L (ref 96–112)
CO2 SERPL-SCNC: 27 MMOL/L (ref 20–33)
CREAT SERPL-MCNC: 1.04 MG/DL (ref 0.5–1.4)
EOSINOPHIL # BLD AUTO: 0.33 K/UL (ref 0–0.51)
EOSINOPHIL NFR BLD: 1.7 % (ref 0–6.9)
ERYTHROCYTE [DISTWIDTH] IN BLOOD BY AUTOMATED COUNT: 55.8 FL (ref 35.9–50)
GLOBULIN SER CALC-MCNC: 4.5 G/DL (ref 1.9–3.5)
GLUCOSE BLD-MCNC: 124 MG/DL (ref 65–99)
GLUCOSE BLD-MCNC: 138 MG/DL (ref 65–99)
GLUCOSE SERPL-MCNC: 101 MG/DL (ref 65–99)
HCT VFR BLD AUTO: 27.5 % (ref 42–52)
HGB BLD-MCNC: 7.8 G/DL (ref 14–18)
HYPOCHROMIA BLD QL SMEAR: ABNORMAL
INR PPP: 1.32 (ref 0.87–1.13)
LYMPHOCYTES # BLD AUTO: 1.82 K/UL (ref 1–4.8)
LYMPHOCYTES NFR BLD: 9.5 % (ref 22–41)
MAGNESIUM SERPL-MCNC: 1.6 MG/DL (ref 1.5–2.5)
MANUAL DIFF BLD: NORMAL
MCH RBC QN AUTO: 21.7 PG (ref 27–33)
MCHC RBC AUTO-ENTMCNC: 28.4 G/DL (ref 33.7–35.3)
MCV RBC AUTO: 76.6 FL (ref 81.4–97.8)
METAMYELOCYTES NFR BLD MANUAL: 0.9 %
MICROCYTES BLD QL SMEAR: ABNORMAL
MONOCYTES # BLD AUTO: 0.67 K/UL (ref 0–0.85)
MONOCYTES NFR BLD AUTO: 3.5 % (ref 0–13.4)
MORPHOLOGY BLD-IMP: NORMAL
MYELOCYTES NFR BLD MANUAL: 0.9 %
NEUTROPHILS # BLD AUTO: 15.86 K/UL (ref 1.82–7.42)
NEUTROPHILS NFR BLD: 79.1 % (ref 44–72)
NEUTS BAND NFR BLD MANUAL: 3.5 % (ref 0–10)
NRBC # BLD AUTO: 0.04 K/UL
NRBC BLD-RTO: 0.2 /100 WBC
OVALOCYTES BLD QL SMEAR: NORMAL
PHOSPHATE SERPL-MCNC: 3.3 MG/DL (ref 2.5–4.5)
PLATELET # BLD AUTO: 482 K/UL (ref 164–446)
PLATELET BLD QL SMEAR: NORMAL
PMV BLD AUTO: 9.2 FL (ref 9–12.9)
POIKILOCYTOSIS BLD QL SMEAR: NORMAL
POLYCHROMASIA BLD QL SMEAR: NORMAL
POTASSIUM SERPL-SCNC: 4.4 MMOL/L (ref 3.6–5.5)
PROCALCITONIN SERPL-MCNC: 0.14 NG/ML
PROT SERPL-MCNC: 7.4 G/DL (ref 6–8.2)
PROTHROMBIN TIME: 16.1 SEC (ref 12–14.6)
RBC # BLD AUTO: 3.59 M/UL (ref 4.7–6.1)
RBC BLD AUTO: PRESENT
SCHISTOCYTES BLD QL SMEAR: NORMAL
SODIUM SERPL-SCNC: 134 MMOL/L (ref 135–145)
TARGETS BLD QL SMEAR: NORMAL
WBC # BLD AUTO: 19.2 K/UL (ref 4.8–10.8)

## 2018-02-08 PROCEDURE — 700111 HCHG RX REV CODE 636 W/ 250 OVERRIDE (IP): Performed by: HOSPITALIST

## 2018-02-08 PROCEDURE — 85007 BL SMEAR W/DIFF WBC COUNT: CPT

## 2018-02-08 PROCEDURE — 83735 ASSAY OF MAGNESIUM: CPT

## 2018-02-08 PROCEDURE — 36415 COLL VENOUS BLD VENIPUNCTURE: CPT

## 2018-02-08 PROCEDURE — 665999 HH PPS REVENUE DEBIT

## 2018-02-08 PROCEDURE — 80053 COMPREHEN METABOLIC PANEL: CPT

## 2018-02-08 PROCEDURE — 92612 ENDOSCOPY SWALLOW (FEES) VID: CPT

## 2018-02-08 PROCEDURE — 665998 HH PPS REVENUE CREDIT

## 2018-02-08 PROCEDURE — 85027 COMPLETE CBC AUTOMATED: CPT

## 2018-02-08 PROCEDURE — 84100 ASSAY OF PHOSPHORUS: CPT

## 2018-02-08 PROCEDURE — 700101 HCHG RX REV CODE 250

## 2018-02-08 PROCEDURE — 94640 AIRWAY INHALATION TREATMENT: CPT

## 2018-02-08 PROCEDURE — A9270 NON-COVERED ITEM OR SERVICE: HCPCS | Performed by: HOSPITALIST

## 2018-02-08 PROCEDURE — G8996 SWALLOW CURRENT STATUS: HCPCS | Mod: CI

## 2018-02-08 PROCEDURE — 82962 GLUCOSE BLOOD TEST: CPT

## 2018-02-08 PROCEDURE — 99239 HOSP IP/OBS DSCHRG MGMT >30: CPT | Performed by: HOSPITALIST

## 2018-02-08 PROCEDURE — 700102 HCHG RX REV CODE 250 W/ 637 OVERRIDE(OP): Performed by: HOSPITALIST

## 2018-02-08 PROCEDURE — G8997 SWALLOW GOAL STATUS: HCPCS | Mod: CH

## 2018-02-08 PROCEDURE — 84145 PROCALCITONIN (PCT): CPT

## 2018-02-08 PROCEDURE — 85610 PROTHROMBIN TIME: CPT

## 2018-02-08 PROCEDURE — 700101 HCHG RX REV CODE 250: Performed by: HOSPITALIST

## 2018-02-08 RX ORDER — POTASSIUM CHLORIDE 1.5 G/1.58G
20 POWDER, FOR SOLUTION ORAL 2 TIMES DAILY
Qty: 30 PACKET | Refills: 0 | Status: SHIPPED | OUTPATIENT
Start: 2018-02-08 | End: 2018-03-01

## 2018-02-08 RX ORDER — IPRATROPIUM BROMIDE AND ALBUTEROL SULFATE 2.5; .5 MG/3ML; MG/3ML
SOLUTION RESPIRATORY (INHALATION)
Status: COMPLETED
Start: 2018-02-08 | End: 2018-02-08

## 2018-02-08 RX ORDER — MAGNESIUM SULFATE HEPTAHYDRATE 40 MG/ML
2 INJECTION, SOLUTION INTRAVENOUS ONCE
Status: COMPLETED | OUTPATIENT
Start: 2018-02-08 | End: 2018-02-08

## 2018-02-08 RX ORDER — FUROSEMIDE 40 MG/1
40 TABLET ORAL DAILY
Qty: 30 TAB | Refills: 0 | Status: SHIPPED | OUTPATIENT
Start: 2018-02-08 | End: 2018-02-26 | Stop reason: SDUPTHER

## 2018-02-08 RX ADMIN — HEPARIN SODIUM 5000 UNITS: 5000 INJECTION, SOLUTION INTRAVENOUS; SUBCUTANEOUS at 06:11

## 2018-02-08 RX ADMIN — ASPIRIN 81 MG: 81 TABLET, CHEWABLE ORAL at 09:35

## 2018-02-08 RX ADMIN — LISINOPRIL 10 MG: 5 TABLET ORAL at 09:35

## 2018-02-08 RX ADMIN — POTASSIUM CHLORIDE 20 MEQ: 1500 TABLET, EXTENDED RELEASE ORAL at 09:35

## 2018-02-08 RX ADMIN — FUROSEMIDE 40 MG: 10 INJECTION, SOLUTION INTRAMUSCULAR; INTRAVENOUS at 06:11

## 2018-02-08 RX ADMIN — ALBUTEROL SULFATE 2 PUFF: 90 AEROSOL, METERED RESPIRATORY (INHALATION) at 00:56

## 2018-02-08 RX ADMIN — LIDOCAINE AND PRILOCAINE 1 APPLICATION: 25; 25 CREAM TOPICAL at 03:58

## 2018-02-08 RX ADMIN — MAGNESIUM SULFATE IN WATER 2 G: 40 INJECTION, SOLUTION INTRAVENOUS at 09:38

## 2018-02-08 RX ADMIN — AMIODARONE HYDROCHLORIDE 200 MG: 200 TABLET ORAL at 09:35

## 2018-02-08 RX ADMIN — IPRATROPIUM BROMIDE AND ALBUTEROL SULFATE 3 ML: .5; 3 SOLUTION RESPIRATORY (INHALATION) at 04:04

## 2018-02-08 RX ADMIN — SPIRONOLACTONE 50 MG: 25 TABLET, FILM COATED ORAL at 09:35

## 2018-02-08 ASSESSMENT — PAIN SCALES - GENERAL
PAINLEVEL_OUTOF10: 0

## 2018-02-08 NOTE — CARE PLAN
Problem: Communication  Goal: The ability to communicate needs accurately and effectively will improve  Outcome: PROGRESSING AS EXPECTED  Pt is oriented to the unit and has been educated on the use of the call light, pt verbalizes understanding      Problem: Infection  Goal: Will remain free from infection  Outcome: PROGRESSING AS EXPECTED  Pt shows no new or worsening s/s of infection

## 2018-02-08 NOTE — PROGRESS NOTES
Assumed care of pt.  Bedside report received and whiteboard updated. Discussed plan of care for the day and answered questions.  Chart and MAR reviewed.  Call light and personal belongings are within reach.  Bed in low position and locked. Pt has no needs at this time.    Dentures: denies  Glasses: denies  Hearing aides: denies

## 2018-02-08 NOTE — THERAPY
Speech Language Therapy FEES completed.  Functional Status: Patient is now decannulated. There is still air escape from stoma site.  Education provided to patient on digital occlusion of stoma site for talking, swallowing, coughing, and bearing down.  Patient needed consistent cues to continue to occlude before, during and after the study, but did verbalize the difference noted when he does occlude.  FEES procedure explained to patient and patient agreed to proceed. Patient initially yelling at clinician upon insertion of the scope and using a few curse words, but he was easily redirected and tolerated the procedure well after this initial incident.  Presentation of PO consisted of ice, nectars, thin liquids, purees, soft solids and solids. Patient is presenting with mild oropharyngeal dysphagia which is further complicated by patient's impulsive behaviors and overall laryngeal weakness.  He is presenting with mild weakness in base of tongue, laryngeal elevation and pharyngeal constriction leading to premature spillage to the level of the valleculae and pyriform sinuses with delayed onset of swallow.  Patient with inconsistent penetration before the swallow on thin liquids and juice from fruit with suspected penetration during the swallow on purees, thins and solids as evidenced in residue on the laryngeal rim, laryngeal vestibule and at the intra-arytenoid space. Overall strength of swallow did improve with digital occlusion of the stoma site. There was no aspiration noted on the study; however, patient remains at high risk for aspiration due to dysphagia, impulsive behaviors and when swallow precautions are not followed.  At this time, would recommend dysphagia III diet with thin liquids with swallow precautions as noted:  1) digital occlusion of stoma site for all swallows 2) small bites and sips 3) no straws 4) double swallow 5) upright at 90* angle for all PO intake and for 45 minutes following PO intake.   "Patient was given extensive education regarding results and recommendations, but will need ongoing reinforcement due to his cognitive deficits which were previously diagnosed on last admission.      Recommendations - Diet: Dysphagia III diet with thin liquids                           Strategies: 1) digital occlusion of stoma site for all swallows 2) small bites and sips 3) no straws 4) double swallow 5) upright at 90* angle for all PO intake and for 45 minutes following PO intake.                          Medication Administration: Float Whole with Puree  Plan of Care: Will benefit from Speech Therapy 3 times per week  Post-Acute Therapy: Discharge to home with outpatient or home health for additional skilled therapy services.    See \"Rehab Therapy-Acute\" Patient Summary Report for complete documentation.   "

## 2018-02-08 NOTE — DISCHARGE INSTRUCTIONS
Discharge Instructions    Discharged to home by car with friend. Discharged via walking, hospital escort: Refused.  Special equipment needed: Not Applicable    Be sure to schedule a follow-up appointment with your primary care doctor or any specialists as instructed.     Discharge Plan:   Diet Plan: Discussed  Activity Level: Discussed  Confirmed Follow up Appointment: Appointment Scheduled  Confirmed Symptoms Management: Discussed  Medication Reconciliation Updated: Yes  Influenza Vaccine Indication: Patient Refuses    I understand that a diet low in cholesterol, fat, and sodium is recommended for good health. Unless I have been given specific instructions below for another diet, I accept this instruction as my diet prescription.   Other diet: Diabetic, cover stoma with fingers when swallowing    Special Instructions: None    · Is patient discharged on Warfarin / Coumadin?   Yes    You are receiving the drug warfarin. Please understand the importance of monitoring warfarin with scheduled PT/INR blood draws.  Follow-up with a call to your personal Doctor's office in 3 days to schedule a PT/INR. .    IMPORTANT: HOW TO USE THIS INFORMATION:  This is a summary and does NOT have all possible information about this product. This information does not assure that this product is safe, effective, or appropriate for you. This information is not individual medical advice and does not substitute for the advice of your health care professional. Always ask your health care professional for complete information about this product and your specific health needs.      WARFARIN - ORAL (WARF-uh-rin)      COMMON BRAND NAME(S): Coumadin      WARNING:  Warfarin can cause very serious (possibly fatal) bleeding. This is more likely to occur when you first start taking this medication or if you take too much warfarin. To decrease your risk for bleeding, your doctor or other health care provider will monitor you closely and check your lab  "results (INR test) to make sure you are not taking too much warfarin. Keep all medical and laboratory appointments. Tell your doctor right away if you notice any signs of serious bleeding. See also Side Effects section.      USES:  This medication is used to treat blood clots (such as in deep vein thrombosis-DVT or pulmonary embolus-PE) and/or to prevent new clots from forming in your body. Preventing harmful blood clots helps to reduce the risk of a stroke or heart attack. Conditions that increase your risk of developing blood clots include a certain type of irregular heart rhythm (atrial fibrillation), heart valve replacement, recent heart attack, and certain surgeries (such as hip/knee replacement). Warfarin is commonly called a \"blood thinner,\" but the more correct term is \"anticoagulant.\" It helps to keep blood flowing smoothly in your body by decreasing the amount of certain substances (clotting proteins) in your blood.      HOW TO USE:  Read the Medication Guide provided by your pharmacist before you start taking warfarin and each time you get a refill. If you have any questions, ask your doctor or pharmacist. Take this medication by mouth with or without food as directed by your doctor or other health care professional, usually once a day. It is very important to take it exactly as directed. Do not increase the dose, take it more frequently, or stop using it unless directed by your doctor. Dosage is based on your medical condition, laboratory tests (such as INR), and response to treatment. Your doctor or other health care provider will monitor you closely while you are taking this medication to determine the right dose for you. Use this medication regularly to get the most benefit from it. To help you remember, take it at the same time each day. It is important to eat a balanced, consistent diet while taking warfarin. Some foods can affect how warfarin works in your body and may affect your treatment and " dose. Avoid sudden large increases or decreases in your intake of foods high in vitamin K (such as broccoli, cauliflower, cabbage, brussels sprouts, kale, spinach, and other green leafy vegetables, liver, green tea, certain vitamin supplements). If you are trying to lose weight, check with your doctor before you try to go on a diet. Cranberry products may also affect how your warfarin works. Limit the amount of cranberry juice (16 ounces/480 milliliters a day) or other cranberry products you may drink or eat.      SIDE EFFECTS:  Nausea, loss of appetite, or stomach/abdominal pain may occur. If any of these effects persist or worsen, tell your doctor or pharmacist promptly. Remember that your doctor has prescribed this medication because he or she has judged that the benefit to you is greater than the risk of side effects. Many people using this medication do not have serious side effects. This medication can cause serious bleeding if it affects your blood clotting proteins too much (shown by unusually high INR lab results). Even if your doctor stops your medication, this risk of bleeding can continue for up to a week. Tell your doctor right away if you have any signs of serious bleeding, including: unusual pain/swelling/discomfort, unusual/easy bruising, prolonged bleeding from cuts or gums, persistent/frequent nosebleeds, unusually heavy/prolonged menstrual flow, pink/dark urine, coughing up blood, vomit that is bloody or looks like coffee grounds, severe headache, dizziness/fainting, unusual or persistent tiredness/weakness, bloody/black/tarry stools, chest pain, shortness of breath, difficulty swallowing. Tell your doctor right away if any of these unlikely but serious side effects occur: persistent nausea/vomiting, severe stomach/abdominal pain, yellowing eyes/skin. This drug rarely has caused very serious (possibly fatal) problems if its effects lead to small blood clots (usually at the beginning of treatment).  This can lead to severe skin/tissue damage that may require surgery or amputation if left untreated. Patients with certain blood conditions (protein C or S deficiency) may be at greater risk. Get medical help right away if any of these rare but serious side effects occur: painful/red/purplish patches on the skin (such as on the toe, breast, abdomen), change in the amount of urine, vision changes, confusion, slurred speech, weakness on one side of the body. A very serious allergic reaction to this drug is rare. However, get medical help right away if you notice any symptoms of a serious allergic reaction, including: rash, itching/swelling (especially of the face/tongue/throat), severe dizziness, trouble breathing. This is not a complete list of possible side effects. If you notice other effects not listed above, contact your doctor or pharmacist. In the [place] - Call your doctor for medical advice about side effects. You may report side effects to FDA at 1-084-TEK-7597. In [place] - Call your doctor for medical advice about side effects. You may report side effects to [facility] [place] at 1-164.543.5705.      PRECAUTIONS:  Before taking warfarin, tell your doctor or pharmacist if you are allergic to it; or if you have any other allergies. This product may contain inactive ingredients, which can cause allergic reactions or other problems. Talk to your pharmacist for more details. Before using this medication, tell your doctor or pharmacist your medical history, especially of: blood disorders (such as anemia, hemophilia), bleeding problems (such as bleeding of the stomach/intestines, bleeding in the brain), blood vessel disorders (such as aneurysms), recent major injury/surgery, liver disease, alcohol use, mental/mood disorders (including memory problems), frequent falls/injuries. It is important that all your doctors and dentists know that you take warfarin. Before having surgery or any medical/dental procedures, tell your  doctor or dentist that you are taking this medication and about all the products you use (including prescription drugs, nonprescription drugs, and herbal products). Avoid getting injections into the muscles. If you must have an injection into a muscle (for example, a flu shot), it should be given in the arm. This way, it will be easier to check for bleeding and/or apply pressure bandages. This medication may cause stomach bleeding. Daily use of alcohol while using this medicine will increase your risk for stomach bleeding and may also affect how this medication works. Limit or avoid alcoholic beverages. If you have not been eating well, if you have an illness or infection that causes fever, vomiting, or diarrhea for more than 2 days, or if you start using any antibiotic medications, contact your doctor or pharmacist immediately because these conditions can affect how warfarin works. This medication can cause heavy bleeding. To lower the chance of getting cut, bruised, or injured, use great caution with sharp objects like safety razors and nail cutters. Use an electric razor when shaving and a soft toothbrush when brushing your teeth. Avoid activities such as contact sports. If you fall or injure yourself, especially if you hit your head, call your doctor immediately. Your doctor may need to check you. The Food & Drug Administration has stated that generic warfarin products are interchangeable. However, consult your doctor or pharmacist before switching warfarin products. Be careful not to take more than one medication that contains warfarin unless specifically directed by the doctor or health care provider who is monitoring your warfarin treatment. Older adults may be at greater risk for bleeding while using this drug. This medication is not recommended for use during pregnancy because of serious (possibly fatal) harm to an unborn baby. Discuss the use of reliable forms of birth control with your doctor. If you  "become pregnant or think you may be pregnant, tell your doctor immediately. If you are planning pregnancy, discuss a plan for managing your condition with your doctor before you become pregnant. Your doctor may switch the type of medication you use during pregnancy. Very small amounts of this medication may pass into breast milk but is unlikely to harm a nursing infant. Consult your doctor before breast-feeding.      DRUG INTERACTIONS:  Drug interactions may change how your medications work or increase your risk for serious side effects. This document does not contain all possible drug interactions. Keep a list of all the products you use (including prescription/nonprescription drugs and herbal products) and share it with your doctor and pharmacist. Do not start, stop, or change the dosage of any medicines without your doctor's approval. Warfarin interacts with many prescription, nonprescription, vitamin, and herbal products. This includes medications that are applied to the skin or inside the vagina or rectum. The interactions with warfarin usually result in an increase or decrease in the \"blood-thinning\" (anticoagulant) effect. Your doctor or other health care professional should closely monitor you to prevent serious bleeding or clotting problems. While taking warfarin, it is very important to tell your doctor or pharmacist of any changes in medications, vitamins, or herbal products that you are taking. Some products that may interact with this drug include: capecitabine, imatinib, mifepristone. Aspirin, aspirin-like drugs (salicylates), and nonsteroidal anti-inflammatory drugs (NSAIDs such as ibuprofen, naproxen, celecoxib) may have effects similar to warfarin. These drugs may increase the risk of bleeding problems if taken during treatment with warfarin. Carefully check all prescription/nonprescription product labels (including drugs applied to the skin such as pain-relieving creams) since the products may " contain NSAIDs or salicylates. Talk to your doctor about using a different medication (such as acetaminophen) to treat pain/fever. Low-dose aspirin and related drugs (such as clopidogrel, ticlopidine) should be continued if prescribed by your doctor for specific medical reasons such as heart attack or stroke prevention. Consult your doctor or pharmacist for more details. Many herbal products interact with warfarin. Tell your doctor before taking any herbal products, especially bromelains, coenzyme Q10, cranberry, danshen, dong quai, fenugreek, garlic, ginkgo biloba, ginseng, and [place]'s wort, among others. This medication may interfere with a certain laboratory test to measure theophylline levels, possibly causing false test results. Make sure laboratory personnel and all your doctors know you use this drug.      OVERDOSE:  If overdose is suspected, contact a poison control center or emergency room immediately. [place] residents can call the [employer] Poison Hotline at 1-255.226.9843. [place] residents can call a provincial poison control center. Symptoms of overdose may include: bloody/black/tarry stools, pink/dark urine, unusual/prolonged bleeding.      NOTES:  Do not share this medication with others. Laboratory and/or medical tests (such as INR, complete blood count) must be performed periodically to monitor your progress or check for side effects. Consult your doctor for more details.      MISSED DOSE:  For the best possible benefit, do not miss any doses. If you do miss a dose and remember on the same day, take it as soon as you remember. If you remember on the next day, skip the missed dose and resume your usual dosing schedule. Do not double the dose to catch up because this could increase your risk for bleeding. Keep a record of missed doses to give to your doctor or pharmacist. Contact your doctor or pharmacist if you miss 2 or more doses in a row.      STORAGE:  Store at room temperature away from light  and moisture. Do not store in the bathroom. Keep all medications away from children and pets. Do not flush medications down the toilet or pour them into a drain unless instructed to do so. Properly discard this product when it is  or no longer needed. Consult your pharmacist or local waste disposal company for more details about how to safely discard your product.      MEDICAL ALERT:  Your condition and medication can cause complications in a medical emergency. For information about enrolling in MedicAlert, call 1-895.835.9268 ([place]) or 1-829.420.2237 ([place]).      Information last revised 2010 Copyright(c)  [employer]             Depression / Suicide Risk    As you are discharged from this [facility] facility, it is important to learn how to keep safe from harming yourself.    Recognize the warning signs:  · Abrupt changes in personality, positive or negative- including increase in energy   · Giving away possessions  · Change in eating patterns- significant weight changes-  positive or negative  · Change in sleeping patterns- unable to sleep or sleeping all the time   · Unwillingness or inability to communicate  · Depression  · Unusual sadness, discouragement and loneliness  · Talk of wanting to die  · Neglect of personal appearance   · Rebelliousness- reckless behavior  · Withdrawal from people/activities they love  · Confusion- inability to concentrate     If you or a loved one observes any of these behaviors or has concerns about self-harm, here's what you can do:  · Talk about it- your feelings and reasons for harming yourself  · Remove any means that you might use to hurt yourself (examples: pills, rope, extension cords, firearm)  · Get professional help from the community (Mental Health, Substance Abuse, psychological counseling)  · Do not be alone:Call your Safe Contact- someone whom you trust who will be there for you.  · Call your local CRISIS HOTLINE 224-4986 or  720.390.8560  · Call your local Children's Mobile Crisis Response Team [place] (878) 682-7537 or www.[employer]  · Call the toll free National Suicide Prevention Hotlines   · National Suicide Prevention Lifeline 084-629-UFKO (2037)  · National Hope Line Network 800-SUICIDE (741-4229)    Respiratory failure is when your lungs are not working well and your breathing (respiratory) system fails. When respiratory failure occurs, it is difficult for your lungs to get enough oxygen, get rid of carbon dioxide, or both. Respiratory failure can be life threatening.   Respiratory failure can be acute or chronic. Acute respiratory failure is sudden, severe, and requires emergency medical treatment. Chronic respiratory failure is less severe, happens over time, and requires ongoing treatment.   WHAT ARE THE CAUSES OF ACUTE RESPIRATORY FAILURE?   Any problem affecting the heart or lungs can cause acute respiratory failure. Some of these causes include the following:  · Chronic bronchitis and emphysema (COPD).    · Blood clot going to a lung (pulmonary embolism).    · Having water in the lungs caused by heart failure, lung injury, or infection (pulmonary edema).    · Collapsed lung (pneumothorax).    · Pneumonia.    · Pulmonary fibrosis.    · Obesity.    · Asthma.    · Heart failure.    · Any type of trauma to the chest that can make breathing difficult.    · Nerve or muscle diseases making chest movements difficult.  WHAT SYMPTOMS SHOULD YOU WATCH FOR?   If you have any of these signs or symptoms, you should seek immediate medical care:   · You have shortness of breath (dyspnea) with or without activity.    · You have rapid, fast breathing (tachypnea).    · You are wheezing.  · You are unable to say more than a few words without having to catch your breath.  · You find it very difficult to function normally.  · You have a fast heart rate.    · You have a bluish color to your finger or toe nail beds.    · You have  confusion or drowsiness or both.    HOW WILL MY ACUTE RESPIRATORY FAILURE BE TREATED?   Treatment of acute respiratory failure depends on the cause of the respiratory failure. Usually, you will stay in the intensive care unit so your breathing can be watched closely. Treatment can include the following:  · Oxygen. Oxygen can be delivered through the following:  ¨ Nasal cannula. This is small tubing that goes in your nose to give you oxygen.  ¨ Face mask. A face mask covers your nose and mouth to give you oxygen.  · Medicine. Different medicines can be given to help with breathing. These can include:  ¨ Nebulizers. Nebulizers deliver medicines to open the air passages (bronchodilators). These medicines help to open or relax the airways in the lungs so you can breathe better. They can also help loosen mucus from your lungs.  ¨ Diuretics. Diuretic medicines can help you breathe better by getting rid of extra water in your body.  ¨ Steroids. Steroid medicines can help decrease swelling (inflammation) in your lungs.  ¨ Antibiotics.  · Chest tube. If you have a collapsed lung (pneumothorax), a chest tube is placed to help reinflate the lung.  · Non-invasive positive pressure ventilation (NPPV). This is a tight-fitting mask that goes over your nose and mouth. The mask has tubing that is attached to a machine. The machine blows air into the tubing, which helps to keep the tiny air sacs (alveoli) in your lungs open. This machine allows you to breathe on your own.  · Ventilator. A ventilator is a breathing machine. When on a ventilator, a breathing tube is put into the lungs. A ventilator is used when you can no longer breathe well enough on your own. You may have low oxygen levels or high carbon dioxide (CO2) levels in your blood. When you are on a ventilator, sedation and pain medicines are given to make you sleep so your lungs can heal.     This information is not intended to replace advice given to you by your health care  provider. Make sure you discuss any questions you have with your health care provider.     Document Released: 12/23/2014 Document Revised: 01/08/2016 Document Reviewed: 12/23/2014  [employer] Interactive Patient Education ©2016 [employer]

## 2018-02-08 NOTE — PROGRESS NOTES
I saw the patient this morning. He is tolerating tracheostomy decannulation with no complication, his breathing and verbalization are normal, no features of respiratory distress, saturation is normal on RA. Stoma is healthy looking and bandaged.  Will sign off the case.

## 2018-02-08 NOTE — CARE PLAN
Problem: Communication  Goal: The ability to communicate needs accurately and effectively will improve    Intervention: Educate patient and significant other/support system about the plan of care, procedures, treatments, medications and allow for questions  White board updated with POC and care team information during bedside report.      Problem: Pain Management  Goal: Pain level will decrease to patient's comfort goal  Emla cream used prior to lab draw

## 2018-02-08 NOTE — PROGRESS NOTES
Bedside report received from [facility]. POC discussed with pt; all questions answered at this time. White board updated. Appropriate functioning of tele monitor confirmed. All needs met at this time.

## 2018-02-08 NOTE — DISCHARGE SUMMARY
Hospital Medicine Discharge Note     Admit Date:  2/6/2018       Discharge Date:   2/8/2018    Attending Physician:  Eliezer Noble     Diagnoses (includes active and resolved):   Active Problems:    Acute on chronic systolic CHF (congestive heart failure) (CMS-HCC) POA: Yes    Type 2 diabetes mellitus (CMS-HCC) POA: Yes    Leukocytosis POA: Yes    Anemia POA: Yes    Atrial fibrillation, controlled (CMS-HCC) POA: Yes    Tracheostomy care (CMS-HCC) POA: Yes    Noncompliance with medication regimen POA: Yes    Aspiration into airway POA: Yes  Resolved Problems:    * No resolved hospital problems. *      Hospital Summary (Brief Narrative):       52 y.o. male with a past medical history of atrial fibrillation, NYHA Class IV congestive heart failure, last LVEF 25-30%, recently discharged on 2/1/18 after having a course of Atrial fibrillation with rapid ventricular response, CABG done on dec 22, 2017, complicated with development of  MSSA Klebsiella pneumoniae, pleural effusion, drained twice, with reoccurrence warranted pleurodesis, with traehostomy p/w difficulty breathing x 2 days patient also notes 9 pound weight gain despite taking his home dose of Lasix. Hence on admission patient was noted to have pitting edema bilateral lower extremities, with anemia.  Patient was admitted for CHF exacerbation. He was treated with IV Lasix. He was also seen by speech therapy. His trach was able to be removed after pulm consult. He had a FEES study and was largely cleared for thin liquids. He admitted he did not take his Lasix at home and promises to take his Lasix and follow-up with his outpatient appointments. He did have elevated white count but this was likely just due to reactive causes as well as pneumonitis but no pneumonia and did not need antibiotics. Patient will be discharged home with follow-up with his PCP as well as cardiology to recheck his white count as well as to check on his compliance with his Lasix at home.  Pt was  on digoxin as an outpatient not on beta blocker. Heart rate in the 60s and addition of beta blocker not needed at this time.   The patient met 2-midnight criteria for an inpatient stay at the time of discharge.     Consultants:      Pulmonologist Dr. Gondal    Procedures:        Trach removal    Discharge Medications:           Medication List      CONTINUE taking these medications      Instructions   albuterol 108 (90 Base) MCG/ACT Aers inhalation aerosol   Inhale 2 Puffs by mouth every 6 hours as needed for Shortness of Breath.  Dose:  2 Puff     amiodarone 200 MG Tabs  Commonly known as:  CORDARONE   Take 1 Tab by mouth every day.  Dose:  200 mg     aspirin 81 MG Chew chewable tablet  Commonly known as:  ASA   Take 1 Tab by mouth every day.  Dose:  81 mg     digoxin 125 MCG Tabs  Commonly known as:  LANOXIN   Take 1 Tab by mouth every day at 6 PM.  Dose:  125 mcg     furosemide 40 MG Tabs  Commonly known as:  LASIX   Take 1 Tab by mouth every day.  Dose:  40 mg     ipratropium-albuterol 0.5-2.5 (3) MG/3ML nebulizer solution  Commonly known as:  DUONEB   3 mL by Nebulization route every four hours as needed for Shortness of Breath.  Dose:  3 mL     lisinopril 10 MG Tabs  Commonly known as:  PRINIVIL   Take 10 mg by mouth every day.  Dose:  10 mg     potassium chloride 20 MEQ Pack  Commonly known as:  KLOR-CON   Take 1 Packet by mouth 2 times a day.  Dose:  20 mEq     rosuvastatin 40 MG tablet  Commonly known as:  CRESTOR   Take 1 Tab by mouth every evening.  Dose:  40 mg     spironolactone 50 MG Tabs  Commonly known as:  ALDACTONE   Take 1 Tab by mouth every day.  Dose:  50 mg     warfarin 5 MG Tabs  Commonly known as:  COUMADIN   Take 1 Tab by mouth every day.  Dose:  5 mg          Disposition:   Discharge home    Diet:   Cardiac, Low Salt    Activity:   As tolerated    Code status:   Full code    Primary Care Provider:    Pcp Pt States None    Follow up appointment details :      Future Appointments  Date Time  Provider Department [place]   2/12/2018 2:15 PM Nam Le M.D. [facility] None   2/26/2018 3:00 PM Jett Bedoya M.D. [facility] None       Pending Studies:        Repeat WBC and BMP at PCP    Time spent on discharge day patient visit: 46 minutes    #################################################    Interval history/exam for day of discharge:    Vitals:    02/08/18 0357 02/08/18 0404 02/08/18 0717 02/08/18 1116   BP: 110/61  106/61 102/50   Pulse: 65 64 68 66   Resp: 18 16 16 18   Temp: 36.4 °C (97.5 °F)  36.7 °C (98.1 °F) 36.5 °C (97.7 °F)   SpO2: 93% 96% 91% 95%   Weight:       Height:         Weight/BMI: Body mass index is 35.5 kg/m².  Pulse Oximetry: 95 %, O2 (LPM): 0, O2 Delivery: None (Room Air)    General:  NAD  Neck Trach removed, loosely bandaged   CVS:  RRR  PULM:  CTAB, no respiratory distress    Most Recent Labs:    Lab Results   Component Value Date/Time    WBC 19.2 (H) 02/08/2018 03:09 AM    RBC 3.59 (L) 02/08/2018 03:09 AM    HEMOGLOBIN 7.8 (L) 02/08/2018 03:09 AM    HEMATOCRIT 27.5 (L) 02/08/2018 03:09 AM    MCV 76.6 (L) 02/08/2018 03:09 AM    MCH 21.7 (L) 02/08/2018 03:09 AM    MCHC 28.4 (L) 02/08/2018 03:09 AM    MPV 9.2 02/08/2018 03:09 AM    NEUTSPOLYS 79.10 (H) 02/08/2018 03:09 AM    LYMPHOCYTES 9.50 (L) 02/08/2018 03:09 AM    MONOCYTES 3.50 02/08/2018 03:09 AM    EOSINOPHILS 1.70 02/08/2018 03:09 AM    EOSINOPHILS 1 01/03/2018 03:45 PM    BASOPHILS 0.90 02/08/2018 03:09 AM    HYPOCHROMIA 1+ 02/08/2018 03:09 AM    ANISOCYTOSIS 1+ 02/08/2018 03:09 AM      Lab Results   Component Value Date/Time    SODIUM 134 (L) 02/08/2018 03:09 AM    POTASSIUM 4.4 02/08/2018 03:09 AM    CHLORIDE 100 02/08/2018 03:09 AM    CO2 27 02/08/2018 03:09 AM    GLUCOSE 101 (H) 02/08/2018 03:09 AM    BUN 18 02/08/2018 03:09 AM    CREATININE 1.04 02/08/2018 03:09 AM      Lab Results   Component Value Date/Time    ALTSGPT 16 02/08/2018 03:09 AM    ASTSGOT 17 02/08/2018 03:09 AM    ALKPHOSPHAT 97 02/08/2018 03:09 AM     TBILIRUBIN 0.3 02/08/2018 03:09 AM    LIPASE 46 04/30/2017 04:37 PM    ALBUMIN 2.9 (L) 02/08/2018 03:09 AM    GLOBULIN 4.5 (H) 02/08/2018 03:09 AM    PREALBUMIN 10.0 (L) 01/29/2018 03:58 AM    INR 1.32 (H) 02/08/2018 03:09 AM    MACROCYTOSIS 1+ 02/06/2018 03:39 PM     Lab Results   Component Value Date/Time    PROTHROMBTM 16.1 (H) 02/08/2018 03:09 AM    INR 1.32 (H) 02/08/2018 03:09 AM        Imaging/ Testing:      DX-CHEST-LIMITED (1 VIEW)   Final Result      Status post left chest tube removal. No pneumothorax identified.   Small left pleural effusion again demonstrated.   Diffuse atelectasis/possible edema or pneumonitis left lung.          Instructions:      The patient was instructed to return to the ER in the event of worsening symptoms. I have counseled the patient on the importance of compliance and the patient has agreed to proceed with all medical recommendations and follow up plan indicated above.   The patient understands that all medications come with benefits and risks. Risks may include permanent injury or death and these risks can be minimized with close reassessment and monitoring.

## 2018-02-08 NOTE — PROGRESS NOTES
Inpatient Anticoagulation Service Note  Date: 2/8/2018  Reason for Anticoagulation: Atrial Fibrillation   Hemoglobin Value: (!) 7.8  Hematocrit Value: (!) 27.5  Lab Platelet Value: (!) 482  Target INR: 2.0 to 3.0  INR from last 7 days     Date/Time INR Value    02/08/18 0309 (!)  1.32    02/06/18 1534 (!)  1.23        Dose from last 7 days     Date/Time Dose (mg)    02/08/18 1100  5    02/07/18 1600  7.5    02/06/18 2100  7.5        Average Dose (mg): TBD (Home Dose: 5 mg daily and pending confirmation)  Significant Interactions: Amiodarone, Aspirin, Statin, Other (Comments) (spironolactone)  Bridge Therapy: No (heparin 500 units sq TID)   Reversal Agent Administered: Not Applicable  Comments: INR up slightly. H/H down. PLT elevated. No overt bleeding noted. Warfarin interactions noted. Will give 5 mg today and trend INR with AM labs. May need acute dose adjustments. Intermittent refusal for lab draws noted per discussion with floor RN.    Plan:  Warfarin 5 mg 2/8/18  Education Material Provided?: No (chronic warfarin patient)  Pharmacist suggested discharge dosing: warfarin 5 mg daily (recommend INR within 72 hours of discharge)    Jin Vo, PharmD

## 2018-02-08 NOTE — PROGRESS NOTES
Inpatient Anticoagulation Service Note  Date: 2/7/2018  Reason for Anticoagulation: Atrial Fibrillation   Hemoglobin Value: (!) 7.3  Hematocrit Value: (!) 27.8  Lab Platelet Value: (!) 500  Target INR: 2.0 to 3.0  INR from last 7 days     Date/Time INR Value    02/06/18 1534 (!)  1.23        Dose from last 7 days     Date/Time Dose (mg)    02/07/18 1600  7.5    02/06/18 2100  7.5        Average Dose (mg): TBD (Home Dose: 5 mg daily and pending confirmation)  Significant Interactions: Amiodarone, Aspirin, Statin, Other (Comments) (spironolactone)  Bridge Therapy: No (heparin 500 units sq TID)   Reversal Agent Administered: Not Applicable  Comments: Updated INR pending. H/H low. PLT elevated. Warfarin interactions noted. Will give 7.5 mg today and trend INR with AM labs. May need acute dose adjustments.    Plan:  Warfarin 7.5 mg 2/7/18  Education Material Provided?: No (chronic warfarin patient)  Pharmacist suggested discharge dosing: warfarin 5 mg daily (pending response to INR this admission)    Jin Vo, PharmD

## 2018-02-09 ENCOUNTER — TELEPHONE (OUTPATIENT)
Dept: VASCULAR LAB | Facility: MEDICAL CENTER | Age: 53
End: 2018-02-09

## 2018-02-09 ENCOUNTER — ANTICOAGULATION MONITORING (OUTPATIENT)
Dept: VASCULAR LAB | Facility: MEDICAL CENTER | Age: 53
End: 2018-02-09

## 2018-02-09 ENCOUNTER — HOME CARE VISIT (OUTPATIENT)
Dept: HOME HEALTH SERVICES | Facility: HOME HEALTHCARE | Age: 53
End: 2018-02-09
Payer: MEDICARE

## 2018-02-09 VITALS
DIASTOLIC BLOOD PRESSURE: 60 MMHG | WEIGHT: 292.44 LBS | SYSTOLIC BLOOD PRESSURE: 104 MMHG | OXYGEN SATURATION: 98 % | HEIGHT: 77 IN | TEMPERATURE: 99 F | BODY MASS INDEX: 34.53 KG/M2 | HEART RATE: 66 BPM | RESPIRATION RATE: 20 BRPM

## 2018-02-09 DIAGNOSIS — I48.92 ATRIAL FLUTTER WITH RAPID VENTRICULAR RESPONSE (HCC): ICD-10-CM

## 2018-02-09 DIAGNOSIS — Z79.01 CHRONIC ANTICOAGULATION: ICD-10-CM

## 2018-02-09 DIAGNOSIS — I48.91 ATRIAL FIBRILLATION, CONTROLLED (HCC): ICD-10-CM

## 2018-02-09 LAB — INR PPP: 1.2 (ref 2–3.5)

## 2018-02-09 PROCEDURE — A6402 STERILE GAUZE <= 16 SQ IN: HCPCS

## 2018-02-09 PROCEDURE — G0493 RN CARE EA 15 MIN HH/HOSPICE: HCPCS

## 2018-02-09 PROCEDURE — 665999 HH PPS REVENUE DEBIT

## 2018-02-09 PROCEDURE — A6212 FOAM DRG <=16 SQ IN W/BORDER: HCPCS

## 2018-02-09 PROCEDURE — 665998 HH PPS REVENUE CREDIT

## 2018-02-09 PROCEDURE — A6219 GAUZE <= 16 SQ IN W/BORDER: HCPCS

## 2018-02-09 PROCEDURE — A5120 SKIN BARRIER, WIPE OR SWAB: HCPCS

## 2018-02-09 SDOH — ECONOMIC STABILITY: HOUSING INSECURITY: UNSAFE COOKING RANGE AREA: 0

## 2018-02-09 SDOH — ECONOMIC STABILITY: HOUSING INSECURITY: UNSAFE APPLIANCES: 0

## 2018-02-09 ASSESSMENT — PATIENT HEALTH QUESTIONNAIRE - PHQ9
1. LITTLE INTEREST OR PLEASURE IN DOING THINGS: 00
2. FEELING DOWN, DEPRESSED, IRRITABLE, OR HOPELESS: 00

## 2018-02-09 ASSESSMENT — ENCOUNTER SYMPTOMS
NAUSEA: NO ISSUES AT THE TIME OF THIS ASSESSMENT.
SHORTNESS OF BREATH: T
VOMITING: NO ISSUES AT THE TIME OF THIS ASSESSMENT.

## 2018-02-09 ASSESSMENT — ACTIVITIES OF DAILY LIVING (ADL)
OASIS_M1830: 05
HOME_HEALTH_OASIS: 02

## 2018-02-09 NOTE — PROGRESS NOTES
.  Anticoagulation Summary  As of 2/9/2018    INR goal:   2.0-3.0   TTR:   --   Today's INR:   1.2!   Maintenance plan:   5 mg (5 mg x 1) every day   Weekly total:   35 mg   Plan last modified:   Wandy Garzon, PharmD (2/9/2018)   Next INR check:   2/13/2018   Target end date:   Indefinite    Indications    Atrial fibrillation  controlled (CMS-[facility]) [I48.91]  Atrial flutter with rapid ventricular response (CMS-HCC) [I48.92]  CVA (cerebral vascular accident) (CMS-HCC) (Resolved) [I63.9]             Anticoagulation Episode Summary     INR check location:       Preferred lab:       Send INR reminders to:       Comments:   [facility]       Anticoagulation Care Providers     Provider Role Specialty Phone number    Jett Bedoya M.D. Referring Cardiology [phone]    [facility] Responsible  [phone]        Anticoagulation Patient Findings    Cardiologist - Dr. Bedoya  Pt hx - Afib, Aflutter  CHADSVASC = 3 (CHF, CAD, DM2)      Pt is new to warfarin and new to [facility]. Briefly tried to discuss new member education with wife, but she states that they went over it with her in the hospital.:   · Pt on ASA 81mg daily    Pt denies any unusual s/s of bleeding, bruising, clotting or any changes to diet or medications.    INR is SUB therapeutic today.   Will continue to titrate pt's warfarin dose. Pt's been taking 5mg daily   Pt to take warfarin as follows  7.5mg daily    Recheck INR in 4 days with [facility] .    Wandy Garzon, PharmD

## 2018-02-09 NOTE — TELEPHONE ENCOUNTER
Initial anticoag note and most recent d/c summary reviewed.  Patient with afib and chads vasc = at least 3    Pending further recommendations, we will continue with indefinite anticoagulation with as directed by david.    Pending further recs, we will continue low dose asa for concomitant CAD.    Will defer all management of rhythm, rate, and other cv issues, aside from anticoagulation, to cards Michael Bloch, MD  Anticoagulation Clinic    Cc:  FADY Bedoya MD

## 2018-02-10 PROCEDURE — 665998 HH PPS REVENUE CREDIT

## 2018-02-10 PROCEDURE — 665999 HH PPS REVENUE DEBIT

## 2018-02-11 LAB
BACTERIA BLD CULT: NORMAL
BACTERIA BLD CULT: NORMAL
SIGNIFICANT IND 70042: NORMAL
SIGNIFICANT IND 70042: NORMAL
SITE SITE: NORMAL
SITE SITE: NORMAL
SOURCE SOURCE: NORMAL
SOURCE SOURCE: NORMAL

## 2018-02-11 PROCEDURE — 665999 HH PPS REVENUE DEBIT

## 2018-02-11 PROCEDURE — 665998 HH PPS REVENUE CREDIT

## 2018-02-12 ENCOUNTER — HOME CARE VISIT (OUTPATIENT)
Dept: HOME HEALTH SERVICES | Facility: HOME HEALTHCARE | Age: 53
End: 2018-02-12
Payer: MEDICARE

## 2018-02-12 ENCOUNTER — OFFICE VISIT (OUTPATIENT)
Dept: INTERNAL MEDICINE | Facility: MEDICAL CENTER | Age: 53
End: 2018-02-12
Payer: MEDICARE

## 2018-02-12 VITALS
SYSTOLIC BLOOD PRESSURE: 99 MMHG | OXYGEN SATURATION: 94 % | HEART RATE: 76 BPM | DIASTOLIC BLOOD PRESSURE: 59 MMHG | TEMPERATURE: 98.4 F

## 2018-02-12 VITALS
DIASTOLIC BLOOD PRESSURE: 70 MMHG | TEMPERATURE: 97.4 F | HEART RATE: 71 BPM | OXYGEN SATURATION: 95 % | RESPIRATION RATE: 18 BRPM | SYSTOLIC BLOOD PRESSURE: 118 MMHG

## 2018-02-12 DIAGNOSIS — D72.829 LEUKOCYTOSIS, UNSPECIFIED TYPE: ICD-10-CM

## 2018-02-12 DIAGNOSIS — D64.9 ANEMIA, UNSPECIFIED TYPE: ICD-10-CM

## 2018-02-12 DIAGNOSIS — J45.20 MILD INTERMITTENT ASTHMA WITHOUT COMPLICATION: ICD-10-CM

## 2018-02-12 DIAGNOSIS — I50.20 SYSTOLIC CONGESTIVE HEART FAILURE, UNSPECIFIED CONGESTIVE HEART FAILURE CHRONICITY: ICD-10-CM

## 2018-02-12 DIAGNOSIS — I48.91 ATRIAL FIBRILLATION, CONTROLLED (HCC): ICD-10-CM

## 2018-02-12 DIAGNOSIS — I10 ESSENTIAL HYPERTENSION: ICD-10-CM

## 2018-02-12 DIAGNOSIS — I25.10 CORONARY ARTERY DISEASE INVOLVING NATIVE CORONARY ARTERY OF NATIVE HEART WITHOUT ANGINA PECTORIS: ICD-10-CM

## 2018-02-12 DIAGNOSIS — J44.9 CHRONIC OBSTRUCTIVE PULMONARY DISEASE, UNSPECIFIED COPD TYPE (HCC): ICD-10-CM

## 2018-02-12 PROBLEM — J96.01 ACUTE HYPOXEMIC RESPIRATORY FAILURE (HCC): Status: RESOLVED | Noted: 2018-02-06 | Resolved: 2018-02-12

## 2018-02-12 PROBLEM — N12 PYELONEPHRITIS: Status: RESOLVED | Noted: 2017-04-30 | Resolved: 2018-02-12

## 2018-02-12 PROBLEM — J96.01 ACUTE RESPIRATORY FAILURE WITH HYPOXIA (HCC): Status: RESOLVED | Noted: 2018-01-23 | Resolved: 2018-02-12

## 2018-02-12 PROBLEM — J45.909 ASTHMA: Status: ACTIVE | Noted: 2018-02-12

## 2018-02-12 PROBLEM — T17.908A ASPIRATION INTO AIRWAY: Status: RESOLVED | Noted: 2018-02-07 | Resolved: 2018-02-12

## 2018-02-12 PROBLEM — E87.0 HYPERNATREMIA: Status: RESOLVED | Noted: 2018-01-24 | Resolved: 2018-02-12

## 2018-02-12 PROBLEM — N39.0 UTI (URINARY TRACT INFECTION): Status: RESOLVED | Noted: 2017-04-30 | Resolved: 2018-02-12

## 2018-02-12 PROBLEM — J44.1 COPD EXACERBATION (HCC): Status: RESOLVED | Noted: 2017-11-19 | Resolved: 2018-02-12

## 2018-02-12 PROBLEM — J18.9 PNEUMONIA: Status: RESOLVED | Noted: 2017-11-19 | Resolved: 2018-02-12

## 2018-02-12 PROBLEM — J81.0 ACUTE PULMONARY EDEMA (HCC): Status: RESOLVED | Noted: 2017-11-19 | Resolved: 2018-02-12

## 2018-02-12 PROBLEM — J90 PLEURAL EFFUSION ON LEFT: Status: RESOLVED | Noted: 2018-01-23 | Resolved: 2018-02-12

## 2018-02-12 PROBLEM — I42.9 CARDIOMYOPATHY (HCC): Status: RESOLVED | Noted: 2017-11-19 | Resolved: 2018-02-12

## 2018-02-12 PROBLEM — I48.92 ATRIAL FLUTTER WITH RAPID VENTRICULAR RESPONSE (HCC): Status: RESOLVED | Noted: 2018-01-23 | Resolved: 2018-02-12

## 2018-02-12 PROCEDURE — 665998 HH PPS REVENUE CREDIT

## 2018-02-12 PROCEDURE — 99204 OFFICE O/P NEW MOD 45 MIN: CPT | Mod: GC | Performed by: INTERNAL MEDICINE

## 2018-02-12 PROCEDURE — G0152 HHCP-SERV OF OT,EA 15 MIN: HCPCS

## 2018-02-12 PROCEDURE — 665999 HH PPS REVENUE DEBIT

## 2018-02-12 RX ORDER — IPRATROPIUM BROMIDE AND ALBUTEROL SULFATE 2.5; .5 MG/3ML; MG/3ML
3 SOLUTION RESPIRATORY (INHALATION) EVERY 4 HOURS PRN
Qty: 30 BULLET | Refills: 0 | Status: SHIPPED | OUTPATIENT
Start: 2018-02-12 | End: 2018-04-19 | Stop reason: SDUPTHER

## 2018-02-12 RX ORDER — ALBUTEROL SULFATE 90 UG/1
2 AEROSOL, METERED RESPIRATORY (INHALATION) EVERY 6 HOURS PRN
Qty: 1 INHALER | Refills: 10 | Status: ON HOLD | OUTPATIENT
Start: 2018-02-12 | End: 2018-10-31

## 2018-02-12 RX ORDER — CAPTOPRIL 25 MG/1
25 TABLET ORAL 3 TIMES DAILY
COMMUNITY
End: 2018-02-26

## 2018-02-12 SDOH — ECONOMIC STABILITY: HOUSING INSECURITY: UNSAFE COOKING RANGE AREA: 0

## 2018-02-12 SDOH — ECONOMIC STABILITY: HOUSING INSECURITY: UNSAFE APPLIANCES: 0

## 2018-02-12 ASSESSMENT — ACTIVITIES OF DAILY LIVING (ADL)
GROOMING_ASSISTANCE: 0
DRESSING_UB_ASSISTANCE: 0
TOILETING_ASSISTANCE: 1
EATING_ASSISTANCE: 0
DRESSING_LB_ASSISTANCE: 0
BATHING_ASSISTANCE: 1
ORAL_CARE_ASSISTANCE: 0

## 2018-02-12 ASSESSMENT — PAIN SCALES - GENERAL: PAINLEVEL: NO PAIN

## 2018-02-12 NOTE — PATIENT INSTRUCTIONS
"DASH Eating Plan  DASH stands for \"Dietary Approaches to Stop Hypertension.\" The DASH eating plan is a healthy eating plan that has been shown to reduce high blood pressure (hypertension). Additional health benefits may include reducing the risk of type 2 diabetes mellitus, heart disease, and stroke. The DASH eating plan may also help with weight loss.  WHAT DO I NEED TO KNOW ABOUT THE DASH EATING PLAN?  For the DASH eating plan, you will follow these general guidelines:  · Choose foods with a percent daily value for sodium of less than 5% (as listed on the food label).  · Use salt-free seasonings or herbs instead of table salt or sea salt.  · Check with your health care provider or pharmacist before using salt substitutes.  · Eat lower-sodium products, often labeled as \"lower sodium\" or \"no salt added.\"  · Eat fresh foods.  · Eat more vegetables, fruits, and low-fat dairy products.  · Choose whole grains. Look for the word \"whole\" as the first word in the ingredient list.  · Choose fish and skinless chicken or turkey more often than red meat. Limit fish, poultry, and meat to 6 oz (170 g) each day.  · Limit sweets, desserts, sugars, and sugary drinks.  · Choose heart-healthy fats.  · Limit cheese to 1 oz (28 g) per day.  · Eat more home-cooked food and less restaurant, buffet, and fast food.  · Limit fried foods.  · Cook foods using methods other than frying.  · Limit canned vegetables. If you do use them, rinse them well to decrease the sodium.  · When eating at a restaurant, ask that your food be prepared with less salt, or no salt if possible.  WHAT FOODS CAN I EAT?  Seek help from a dietitian for individual calorie needs.  Grains  Whole grain or whole wheat bread. Brown rice. Whole grain or whole wheat pasta. Quinoa, bulgur, and whole grain cereals. Low-sodium cereals. Corn or whole wheat flour tortillas. Whole grain cornbread. Whole grain crackers. Low-sodium crackers.  Vegetables  Fresh or frozen vegetables " (raw, steamed, roasted, or grilled). Low-sodium or reduced-sodium tomato and vegetable juices. Low-sodium or reduced-sodium tomato sauce and paste. Low-sodium or reduced-sodium canned vegetables.   Fruits  All fresh, canned (in natural juice), or frozen fruits.  Meat and Other Protein Products  Ground beef (85% or leaner), grass-fed beef, or beef trimmed of fat. Skinless chicken or turkey. Ground chicken or turkey. Pork trimmed of fat. All fish and seafood. Eggs. Dried beans, peas, or lentils. Unsalted nuts and seeds. Unsalted canned beans.  Dairy  Low-fat dairy products, such as skim or 1% milk, 2% or reduced-fat cheeses, low-fat ricotta or cottage cheese, or plain low-fat yogurt. Low-sodium or reduced-sodium cheeses.  Fats and Oils  Tub margarines without trans fats. Light or reduced-fat mayonnaise and salad dressings (reduced sodium). Avocado. Safflower, olive, or canola oils. Natural peanut or almond butter.  Other  Unsalted popcorn and pretzels.  The items listed above may not be a complete list of recommended foods or beverages. Contact your dietitian for more options.  WHAT FOODS ARE NOT RECOMMENDED?  Grains  White bread. White pasta. White rice. Refined cornbread. Bagels and croissants. Crackers that contain trans fat.  Vegetables  Creamed or fried vegetables. Vegetables in a cheese sauce. Regular canned vegetables. Regular canned tomato sauce and paste. Regular tomato and vegetable juices.  Fruits  Dried fruits. Canned fruit in light or heavy syrup. Fruit juice.  Meat and Other Protein Products  Fatty cuts of meat. Ribs, chicken wings, leos, sausage, bologna, salami, chitterlings, fatback, hot dogs, bratwurst, and packaged luncheon meats. Salted nuts and seeds. Canned beans with salt.  Dairy  Whole or 2% milk, cream, half-and-half, and cream cheese. Whole-fat or sweetened yogurt. Full-fat cheeses or blue cheese. Nondairy creamers and whipped toppings. Processed cheese, cheese spreads, or cheese  curds.  Condiments  Onion and garlic salt, seasoned salt, table salt, and sea salt. Canned and packaged gravies. Worcestershire sauce. Tartar sauce. Barbecue sauce. Teriyaki sauce. Soy sauce, including reduced sodium. Steak sauce. Fish sauce. Oyster sauce. Cocktail sauce. Horseradish. Ketchup and mustard. Meat flavorings and tenderizers. Bouillon cubes. Hot sauce. Tabasco sauce. Marinades. Taco seasonings. Relishes.  Fats and Oils  Butter, stick margarine, lard, shortening, ghee, and leso fat. Coconut, palm kernel, or palm oils. Regular salad dressings.  Other  Pickles and olives. Salted popcorn and pretzels.  The items listed above may not be a complete list of foods and beverages to avoid. Contact your dietitian for more information.  WHERE CAN I FIND MORE INFORMATION?  National [employer] [place]: www.nhlbi.nih.gov/health/health-topics/topics/dash/     This information is not intended to replace advice given to you by your health care provider. Make sure you discuss any questions you have with your health care provider.     Document Released: 12/06/2012 Document Revised: 01/08/2016 Document Reviewed: 10/22/2014  [employer] Interactive Patient Education ©2016 [employer]

## 2018-02-12 NOTE — PROGRESS NOTES
New Patient to Establish    Reason to establish: New patient to establish    CC: Establish care, medication refill,     HPI:   52-year-old male with complex cardiac history that was diagnosed within the past 6 months, along with remote past history of COPD and asthma. Patient is here with wife and daughter. Patient has not seen a primary care doctor in the past 10-15 years.     COPD and Asthma  Diagnosed in 2012. Patient is a former smoker, he quit in December 2017.  He has had asthma since childhood- managed on when necessary albuterol  Currently on Albuterol PRN and duoneb BID. Using oxygen at night with nebulizer for moisture since having his tracheostomy. Tracheostomy has been reversed.   Patient has cough productive of Greenish sputum. Denies fever, chills, and night sweats.     Diabetes  A1c of 6.8. Recently diagnosed during inpatient hospital stay. Currently on no medication.    CAD, with 2 vessel CABG  Recently diagnosed during inpatient hospital stay after presenting to the hospital with COPD exacerbation due to pneumonia with fluid overload due to CHF with cardiomyopathy (most recent EF of 25-30% in 1/2018). Patients troponin were elevated at the time and coronary cath was performed resulting in 2 vessel CABG. He had recurrent pleural effusion requiring VATS procedure with decortication and pleurodesis. During his stay he was in the ICU and required tracheostomy following that, which was recently reversed, and patient is doing well.   Patient has been managed on a statin, ASA, warfarin, spironolactone (beta blocker was not started)  Carotid duplex shows no significant stenosis- 11/2017 Feb 26 - patient has follow up with Cardio    Atrial fibrillation-  Patient is on Warfarin and Amiodarone  This was also diagnosed during recent hospitalization, where he had multiple episodes of A fib with RVR.     Dyslipidemia-  Lipid panel performed during inpatient stay. Patient is on rosuvastatin.     Anemia-   Denies  blood in stool, melena, or change in bowel habits. Hemoglobin has been trending down over the past couple of months, but stable for some time.     Leucocytosis-  WBCs has been variable and elevated. Gives h/o cough productive of greenish sputum, but denies constitutional symptoms and denies urinary symptoms.  4/2017 CT showed Splenomegaly.     Ventral hernia:  Hernia repair in 2006.   Currently asymptomatic.     Patient adamantly refused pneumonia vaccine.     Patient Active Problem List    Diagnosis Date Noted   • COPD (chronic obstructive pulmonary disease) (CMS-HCC) 11/19/2017     Priority: High   • Acute on chronic systolic CHF (congestive heart failure) (CMS-HCC) 02/06/2018     Priority: Medium   • Asthma 02/12/2018   • Hypertension 02/12/2018   • Noncompliance with medication regimen 02/07/2018   • Anemia 02/06/2018   • Atrial fibrillation, controlled (CMS-HCC) 02/06/2018   • Tracheostomy care (CMS-HCC) 02/06/2018   • Leukocytosis 01/24/2018   • CAD (coronary artery disease) 12/22/2017   • CHF (congestive heart failure) (CMS-HCC) 12/19/2017   • Chronic systolic CHF (congestive heart failure), NYHA class 4 (CMS-HCC) 11/23/2017   • Type 2 diabetes mellitus (CMS-HCC) 11/20/2017   • NSTEMI (non-ST elevated myocardial infarction) (CMS-HCC) 11/19/2017   • Hyponatremia 04/30/2017   • LINSEY (acute kidney injury) (CMS-HCC) 04/30/2017       Past Medical History:   Diagnosis Date   • ASTHMA    • Chronic obstructive pulmonary disease (CMS-HCC)    • Diabetes        Current Outpatient Prescriptions   Medication Sig Dispense Refill   • captopril (CAPOTEN) 25 MG Tab Take 25 mg by mouth 3 times a day.     • NYSTATIN PO Take  by mouth.     • ipratropium-albuterol (DUONEB) 0.5-2.5 (3) MG/3ML nebulizer solution 3 mL by Nebulization route every four hours as needed for Shortness of Breath. 30 Bullet 0   • albuterol 108 (90 Base) MCG/ACT Aero Soln inhalation aerosol Inhale 2 Puffs by mouth every 6 hours as needed for Shortness of  Breath. 1 Inhaler 10   • furosemide (LASIX) 40 MG Tab Take 1 Tab by mouth every day. 30 Tab 0   • potassium chloride (KLOR-CON) 20 MEQ Pack Take 1 Packet by mouth 2 times a day. 30 Packet 0   • amiodarone (CORDARONE) 200 MG Tab Take 1 Tab by mouth every day. 30 Tab 0   • digoxin (LANOXIN) 125 MCG Tab Take 1 Tab by mouth every day at 6 PM. 30 Tab 0   • rosuvastatin (CRESTOR) 40 MG tablet Take 1 Tab by mouth every evening. 30 Tab 0   • spironolactone (ALDACTONE) 50 MG Tab Take 1 Tab by mouth every day. 30 Tab 0   • warfarin (COUMADIN) 5 MG Tab Take 1 Tab by mouth every day. 30 Tab 0   • aspirin (ASA) 81 MG Chew Tab chewable tablet Take 1 Tab by mouth every day. 100 Tab      No current facility-administered medications for this visit.        Allergies as of 02/12/2018 - Reviewed 02/12/2018   Allergen Reaction Noted   • Erythromycin Anaphylaxis 05/01/2017   • Cillins [penicillins] Rash 04/30/2017   • Shellfish allergy Anaphylaxis 11/20/2017       Social History     Social History   • Marital status:      Spouse name: N/A   • Number of children: N/A   • Years of education: N/A     Occupational History   • Not on file.     Social History Main Topics   • Smoking status: Former Smoker     Packs/day: 0.50     Years: 20.00     Types: Cigarettes     Quit date: 12/13/2017   • Smokeless tobacco: Never Used   • Alcohol use No   • Drug use: No   • Sexual activity: Not on file     Other Topics Concern   • Not on file     Social History Narrative   • No narrative on file       Family History   Problem Relation Age of Onset   • Diabetes Mother    • Diabetes Father        Past Surgical History:   Procedure Laterality Date   • THORACOSCOPY Left 1/25/2018    Procedure: THORACOSCOPY- PLEURODESIS ;  Surgeon: Chandu Paez M.D.;  Location: SURGERY [facility];  Service: General   • MULTIPLE CORONARY ARTERY BYPASS ENDO VEIN HARVEST  12/22/2017    Procedure: MULTIPLE CORONARY ARTERY BYPASS ENDO VEIN HARVEST X2;  Surgeon: Kiel  ALISSA Ash M.D.;  Location: SURGERY [facility];  Service: Cardiothoracic   • JIM  12/22/2017    Procedure: JIM;  Surgeon: Kiel Ash M.D.;  Location: SURGERY [facility];  Service: Cardiothoracic   • OTHER ABDOMINAL SURGERY         ROS: As per HPI. Additional pertinent symptoms as noted below.    All others negative    BP (!) 99/59   Pulse 76   Temp 36.9 °C (98.4 °F)   SpO2 94%     Physical Exam  General:  Alert and oriented, No apparent distress.    Eyes: Pupils equal and reactive. No scleral icterus.    Throat: Clear no erythema or exudates noted.    Neck: Supple. No lymphadenopathy noted. Thyroid not enlarged. No JVD    Lungs: Clear to auscultation with distant breath sounds and percussion bilaterally.    Cardiovascular: Regular rate and rhythm. Murmur present in left second intercostal space.    Abdomen:  Benign. No rebound or guarding noted.    Extremities: No clubbing, cyanosis. 3 + pitting edema up to below knee    Skin: Clear. No rash or suspicious skin lesions noted. Pigmentation of LE.    Note: I have reviewed all pertinent labs and diagnostic tests associated with this visit with specific comments listed under the assessment and plan below    Assessment and Plan    1. Leukocytosis, unspecified type  WBCs has been variable and elevated. Gives h/o cough productive of greenish sputum, but denies constitutional symptoms and denies urinary symptoms.  4/2017 CT showed Splenomegaly.     CBC ordered. Peripheral smear review.    2. Anemia, unspecified type  Denies blood in stool, melena, or change in bowel habits. Hemoglobin has been trending down over the past couple of months, but stable for some time.     CBC ordered. Peripheral smear review. Iron studies, with occult blood stool.    3. Mild intermittent asthma without complication + COPD  COPD diagnosed in 2012. Patient is a former smoker, he quit in December 2017.  He has had asthma since childhood- managed on PRN albuterol  Currently on  Albuterol PRN and duoneb BID. Using oxygen at night with nebulizer for moisture since having his tracheostomy. Tracheostomy has been reversed.   Patient has cough productive of Greenish sputum. Denies fever, chills, and night sweats.     Duoneb and albuterol refilled. Pulmonary function tests ordered.     4. Atrial fibrillation, controlled (CMS-HCC)  Patient is on Warfarin and Amiodarone.   INR of 1.23 on 2/6/2018    TSH ordered.    5. CAD + CHF + HTN  Recently diagnosed during inpatient hospital stay after presenting to the hospital with COPD exacerbation due to pneumonia with fluid overload due to CHF with cardiomyopathy (most recent EF of 25-30% in 1/2018). Patients troponins were elevated at the time and coronary cath was performed resulting in 2 vessel CABG.     He had recurrent pleural effusion requiring VATS procedure with decortication and pleurodesis. During his stay he was in the ICU and required tracheostomy following that, which was recently reversed, and patient is doing well.     Carotid duplex shows no significant stenosis- 11/2017  most recent EF of 25-30% in 1/2018  Renal function within normal limits  BNP of 440 2/6/2018    On exam, no JVD, lungs clear, but distant sounds, 3+ LE edema, murmur present. Blood pressure in clinic of 99/59.     Patient has been managed on a rosuvastatin, ASA, warfarin, spironolactone (beta blocker was not started on most recent discharge because of HR in the 60s), lasix, and captopril.  Patient refused pneumonia vaccine.   On Feb 26 - patient has follow up with Cardio. Continue current regimen. CMP ordered.        Followup: Return in about 3 weeks (around 3/5/2018).    Risk Assessment (discuss potential complications a function of chronic problems): x    Complexity (discuss number of co-morbidities): x    Signed by: Nam Le M.D.

## 2018-02-13 ENCOUNTER — ANTICOAGULATION MONITORING (OUTPATIENT)
Dept: VASCULAR LAB | Facility: MEDICAL CENTER | Age: 53
End: 2018-02-13

## 2018-02-13 ENCOUNTER — HOME CARE VISIT (OUTPATIENT)
Dept: HOME HEALTH SERVICES | Facility: HOME HEALTHCARE | Age: 53
End: 2018-02-13
Payer: MEDICARE

## 2018-02-13 DIAGNOSIS — I48.91 ATRIAL FIBRILLATION, CONTROLLED (HCC): ICD-10-CM

## 2018-02-13 LAB — INR PPP: 1.2 (ref 2–3.5)

## 2018-02-13 PROCEDURE — 665999 HH PPS REVENUE DEBIT

## 2018-02-13 PROCEDURE — 665998 HH PPS REVENUE CREDIT

## 2018-02-13 PROCEDURE — G0299 HHS/HOSPICE OF RN EA 15 MIN: HCPCS

## 2018-02-13 NOTE — PROGRESS NOTES
OP Telephone Anticoagulation Service Note    Date: 2/13/2018      Anticoagulation Summary  As of 2/13/2018    INR goal:   2.0-3.0   TTR:   --   Today's INR:   1.2!   Maintenance plan:   5 mg (5 mg x 1) every day   Weekly total:   35 mg   Plan last modified:   Zak CanD (2/9/2018)   Next INR check:   2/16/2018   Target end date:   Indefinite    Indications    Atrial fibrillation  controlled (CMS-HCC) [I48.91]  Atrial flutter with rapid ventricular response (CMS-HCC) (Resolved) [I48.92]  CVA (cerebral vascular accident) (CMS-HCC) (Resolved) [I63.9]             Anticoagulation Episode Summary     INR check location:       Preferred lab:       Send INR reminders to:       Comments:   [facility]      Anticoagulation Care Providers     Provider Role Specialty Phone number    Jett Bedoya M.D. Referring Cardiology [phone]    [facility] Responsible  [phone]        Anticoagulation Patient Findings        Plan: INR is low today. Spoke with pts caregiver. Confirmed dosing regimen, pt accidentally got 10 mg on Friday (got a double dose). He is relatively new to warfarin.   No missed or extra dosing taken. Pt is to take 12.5 mg tomorrow, then 10 mg Thursday then check INR Follow up in 3 day(s)      Khalida Comer, ZakD

## 2018-02-14 ENCOUNTER — HOME CARE VISIT (OUTPATIENT)
Dept: HOME HEALTH SERVICES | Facility: HOME HEALTHCARE | Age: 53
End: 2018-02-14
Payer: MEDICARE

## 2018-02-14 PROCEDURE — 665999 HH PPS REVENUE DEBIT

## 2018-02-14 PROCEDURE — 665998 HH PPS REVENUE CREDIT

## 2018-02-14 PROCEDURE — G0153 HHCP-SVS OF S/L PATH,EA 15MN: HCPCS

## 2018-02-14 SDOH — ECONOMIC STABILITY: HOUSING INSECURITY
HOME SAFETY: FIRE ESCAPE PLAN DEVELOPED. PATIENT DOES NOT HAVE FLAMMABLE MATERIALS PRESENT IN THE HOME PRESENTING A FIRE HAZARD. NO EVIDENCE FOUND OF SMOKING MATERIALS PRESENT IN THE HOME.  PATIENT STAYING IN SINGLE LEVEL HOME WITH DAUGHTER AND SON-IN-LAW. HOME

## 2018-02-14 SDOH — ECONOMIC STABILITY: HOUSING INSECURITY
HOME SAFETY: IS UNCLEAN WITH CLUTTERED WALKWAYS, LAUNDRY IN HALLS, 2 DOGS AND 2 CATS RUNNING AROUND, LITTER BOXES NOT CLEANED, STRONG ODOR OF CAT URINE NOTED. PATIENT DOES NOT USE RECOMMENDED ADAPTIVE EQUIPMENT, DOES NOT WEAR SHOES OR SLIPPERS. HE HAS AN OXYGEN C

## 2018-02-14 SDOH — ECONOMIC STABILITY: HOUSING INSECURITY: HOME SAFETY: ONCENTRATOR AND USES HIS O2 AS NEEDED ONLY. HE DOES HAVE ONE OXYGEN TANK STORED IN A HOLDER.

## 2018-02-14 SDOH — ECONOMIC STABILITY: HOUSING INSECURITY
HOME SAFETY: OXYGEN SAFETY RISK ASSESSMENT PERFORMED. PATIENT DOES HAVE A NO SMOKING SIGN POSTED IN THE HOME. PATIENT DOES HAVE A WORKING FIRE EXTINGUISHER PRESENT IN THE HOME. SMOKE ALARMS ARE PRESENT AND FUNCTIONAL ON EACH LEVEL OF THE HOME. PATIENT DOES HAVE A

## 2018-02-15 VITALS
HEART RATE: 78 BPM | SYSTOLIC BLOOD PRESSURE: 104 MMHG | TEMPERATURE: 98.7 F | WEIGHT: 298.6 LBS | BODY MASS INDEX: 35.41 KG/M2 | DIASTOLIC BLOOD PRESSURE: 64 MMHG | RESPIRATION RATE: 18 BRPM

## 2018-02-15 VITALS
DIASTOLIC BLOOD PRESSURE: 60 MMHG | HEART RATE: 97 BPM | OXYGEN SATURATION: 95 % | TEMPERATURE: 96.8 F | BODY MASS INDEX: 35.93 KG/M2 | SYSTOLIC BLOOD PRESSURE: 124 MMHG | WEIGHT: 303 LBS | RESPIRATION RATE: 18 BRPM

## 2018-02-15 PROCEDURE — 665998 HH PPS REVENUE CREDIT

## 2018-02-15 PROCEDURE — 665999 HH PPS REVENUE DEBIT

## 2018-02-15 SDOH — ECONOMIC STABILITY: HOUSING INSECURITY: UNSAFE COOKING RANGE AREA: 0

## 2018-02-15 SDOH — ECONOMIC STABILITY: HOUSING INSECURITY: UNSAFE APPLIANCES: 0

## 2018-02-15 SDOH — ECONOMIC STABILITY: HOUSING INSECURITY: HOME SAFETY: MOVES BETWEEN HIS APARETMENT AND DAUGHTER'S HOUSE

## 2018-02-15 ASSESSMENT — ENCOUNTER SYMPTOMS
SEVERE DYSPNEA: 1
MENTAL STATUS CHANGE: 0
VOMITING: DENIES ANY
NAUSEA: DENIES ANY
SHORTNESS OF BREATH: T

## 2018-02-16 ENCOUNTER — ANTICOAGULATION MONITORING (OUTPATIENT)
Dept: VASCULAR LAB | Facility: MEDICAL CENTER | Age: 53
End: 2018-02-16

## 2018-02-16 ENCOUNTER — HOME CARE VISIT (OUTPATIENT)
Dept: HOME HEALTH SERVICES | Facility: HOME HEALTHCARE | Age: 53
End: 2018-02-16
Payer: MEDICARE

## 2018-02-16 DIAGNOSIS — I48.91 ATRIAL FIBRILLATION, CONTROLLED (HCC): ICD-10-CM

## 2018-02-16 LAB — INR PPP: 1.3 (ref 2–3.5)

## 2018-02-16 PROCEDURE — 665999 HH PPS REVENUE DEBIT

## 2018-02-16 PROCEDURE — G0299 HHS/HOSPICE OF RN EA 15 MIN: HCPCS

## 2018-02-16 PROCEDURE — 665998 HH PPS REVENUE CREDIT

## 2018-02-17 ENCOUNTER — HOME CARE VISIT (OUTPATIENT)
Dept: HOME HEALTH SERVICES | Facility: HOME HEALTHCARE | Age: 53
End: 2018-02-17
Payer: MEDICARE

## 2018-02-17 VITALS
WEIGHT: 296.2 LBS | OXYGEN SATURATION: 95 % | RESPIRATION RATE: 18 BRPM | TEMPERATURE: 99 F | BODY MASS INDEX: 35.12 KG/M2 | DIASTOLIC BLOOD PRESSURE: 76 MMHG | HEART RATE: 76 BPM | SYSTOLIC BLOOD PRESSURE: 124 MMHG

## 2018-02-17 LAB
MYCOBACTERIUM SPEC CULT: NORMAL
RHODAMINE-AURAMINE STN SPEC: NORMAL
SIGNIFICANT IND 70042: NORMAL
SITE SITE: NORMAL
SOURCE SOURCE: NORMAL

## 2018-02-17 PROCEDURE — G0151 HHCP-SERV OF PT,EA 15 MIN: HCPCS

## 2018-02-17 PROCEDURE — 665999 HH PPS REVENUE DEBIT

## 2018-02-17 PROCEDURE — 665998 HH PPS REVENUE CREDIT

## 2018-02-17 SDOH — ECONOMIC STABILITY: HOUSING INSECURITY: HOME SAFETY: SMALL VERY CLUTTERED APARTMENT

## 2018-02-17 ASSESSMENT — ACTIVITIES OF DAILY LIVING (ADL)
IADLS_COMMENTS: <!--EPICS-->SEE OT EVAL<!--EPICE-->
ADLS_COMMENTS: <!--EPICS-->SEE OT EVAL<!--EPICE-->

## 2018-02-17 NOTE — PROGRESS NOTES
Anticoagulation Summary  As of 2/16/2018    INR goal:   2.0-3.0   TTR:   --   Today's INR:   1.3!   Maintenance plan:   7.5 mg (5 mg x 1.5) every day   Weekly total:   52.5 mg   Plan last modified:   Khalida Comer, PharmD (2/13/2018)   Next INR check:   2/19/2018   Target end date:   Indefinite    Indications    Atrial fibrillation  controlled (CMS-HCC) [I48.91]  Atrial flutter with rapid ventricular response (CMS-HCC) (Resolved) [I48.92]  CVA (cerebral vascular accident) (CMS-HCC) (Resolved) [I63.9]             Anticoagulation Episode Summary     INR check location:       Preferred lab:       Send INR reminders to:       Comments:   [facility]      Anticoagulation Care Providers     Provider Role Specialty Phone number    Jett Bedoya M.D. Referring Cardiology [phone]    [facility] Anticoagulation Services Responsible  [phone]        Anticoagulation Patient Findings  Patient Findings     Negatives:   Signs/symptoms of thrombosis, Signs/symptoms of bleeding, Laboratory test error suspected, Change in health, Change in alcohol use, Change in activity, Upcoming invasive procedure, Emergency department visit, Upcoming dental procedure, Missed doses, Extra doses, Change in medications, Change in diet/appetite, Hospital admission, Bruising, Other complaints        Spoke with patient today regarding subtherapeutic INR of 1.3.  Patient denies any signs/symptoms of bruising or bleeding or any changes in diet and medications.  Instructed patient to call clinic with any questions or concerns.  Instructed patient to take 12.5mg X 3, then increase weekly warfarin regimen to account for dosing over the last 7 days.  Follow up in 3 days, to reduce risk of adverse events related to this high risk medication,  Warfarin.    Johnathan Chen, PharmD

## 2018-02-18 ENCOUNTER — HOME CARE VISIT (OUTPATIENT)
Dept: HOME HEALTH SERVICES | Facility: HOME HEALTHCARE | Age: 53
End: 2018-02-18
Payer: MEDICARE

## 2018-02-18 VITALS
HEART RATE: 66 BPM | TEMPERATURE: 98.6 F | SYSTOLIC BLOOD PRESSURE: 108 MMHG | RESPIRATION RATE: 18 BRPM | DIASTOLIC BLOOD PRESSURE: 66 MMHG | OXYGEN SATURATION: 97 %

## 2018-02-18 PROCEDURE — 665998 HH PPS REVENUE CREDIT

## 2018-02-18 PROCEDURE — G0152 HHCP-SERV OF OT,EA 15 MIN: HCPCS

## 2018-02-18 PROCEDURE — 665999 HH PPS REVENUE DEBIT

## 2018-02-19 ENCOUNTER — HOME CARE VISIT (OUTPATIENT)
Dept: HOME HEALTH SERVICES | Facility: HOME HEALTHCARE | Age: 53
End: 2018-02-19
Payer: MEDICARE

## 2018-02-19 VITALS
RESPIRATION RATE: 19 BRPM | HEART RATE: 68 BPM | SYSTOLIC BLOOD PRESSURE: 100 MMHG | BODY MASS INDEX: 35.05 KG/M2 | WEIGHT: 295.6 LBS | DIASTOLIC BLOOD PRESSURE: 58 MMHG | OXYGEN SATURATION: 96 % | TEMPERATURE: 98.9 F

## 2018-02-19 PROCEDURE — 665999 HH PPS REVENUE DEBIT

## 2018-02-19 PROCEDURE — G0496 LPN CARE TRAIN/EDU IN HH: HCPCS

## 2018-02-19 PROCEDURE — 665998 HH PPS REVENUE CREDIT

## 2018-02-19 SDOH — ECONOMIC STABILITY: HOUSING INSECURITY: UNSAFE APPLIANCES: 0

## 2018-02-19 SDOH — ECONOMIC STABILITY: HOUSING INSECURITY: UNSAFE COOKING RANGE AREA: 0

## 2018-02-19 ASSESSMENT — ENCOUNTER SYMPTOMS: SHORTNESS OF BREATH: T

## 2018-02-20 ENCOUNTER — ANTICOAGULATION MONITORING (OUTPATIENT)
Dept: VASCULAR LAB | Facility: MEDICAL CENTER | Age: 53
End: 2018-02-20

## 2018-02-20 VITALS
HEART RATE: 70 BPM | TEMPERATURE: 98.1 F | WEIGHT: 297.2 LBS | BODY MASS INDEX: 35.24 KG/M2 | SYSTOLIC BLOOD PRESSURE: 120 MMHG | DIASTOLIC BLOOD PRESSURE: 70 MMHG | RESPIRATION RATE: 16 BRPM | OXYGEN SATURATION: 94 %

## 2018-02-20 DIAGNOSIS — I48.91 ATRIAL FIBRILLATION, CONTROLLED (HCC): ICD-10-CM

## 2018-02-20 LAB — INR PPP: 1.6 (ref 2–3.5)

## 2018-02-20 PROCEDURE — 665999 HH PPS REVENUE DEBIT

## 2018-02-20 PROCEDURE — 665998 HH PPS REVENUE CREDIT

## 2018-02-20 SDOH — ECONOMIC STABILITY: HOUSING INSECURITY: UNSAFE COOKING RANGE AREA: 0

## 2018-02-20 SDOH — ECONOMIC STABILITY: HOUSING INSECURITY: UNSAFE APPLIANCES: 0

## 2018-02-20 NOTE — PROGRESS NOTES
"  OP Anticoagulation Telephone Note    Date: 2/20/2018       Plan:  Spoke with pt on the phone. Pt is subtherapeutic again today. Previously noted was \"resolved CVA,\" on pt's episodic information, however, through chart review, no such indication was found.  Spoke with pt who also denies ever being told he had a stroke or CVA in his past.  During chart review, this was only noted once, on Dr. Hilliard's d/c summary, but found to be mentioned again.  Unclear if this was in error.  As such, pt's CHADS2=3 (CHF, HTN, DMII).  No bridge necessary at this time.  Denies any changes in medications or diet. Denies any s/sx of bleeding or clotting. Will continue to increase warfarin dosing as outlined below and follow-up with pt in 2 days.  He has already taken his warfarin this morning.    WED: 15MG    Anticoagulation Summary  As of 2/20/2018    INR goal:   2.0-3.0   TTR:   --   Today's INR:   1.6!   Maintenance plan:   10 mg (5 mg x 2) every day   Weekly total:   70 mg   Plan last modified:   Johnathan Chen, PharmD (2/16/2018)   Next INR check:   2/23/2018   Target end date:   Indefinite    Indications    Atrial fibrillation  controlled (CMS-HCC) [I48.91]  Atrial flutter with rapid ventricular response (CMS-HCC) (Resolved) [I48.92]             Anticoagulation Episode Summary     INR check location:       Preferred lab:       Send INR reminders to:       Comments:   [facility]      Anticoagulation Care Providers     Provider Role Specialty Phone number    Jett Bedoya M.D. Referring Cardiology [phone]    [facility] Anticoagulation Services Responsible  [phone]              NIKITA Jarrell  [facility]      "

## 2018-02-21 LAB
FUNGUS SPEC CULT: NORMAL
SIGNIFICANT IND 70042: NORMAL
SITE SITE: NORMAL
SOURCE SOURCE: NORMAL

## 2018-02-21 PROCEDURE — 665998 HH PPS REVENUE CREDIT

## 2018-02-21 PROCEDURE — 665999 HH PPS REVENUE DEBIT

## 2018-02-22 ENCOUNTER — HOME CARE VISIT (OUTPATIENT)
Dept: HOME HEALTH SERVICES | Facility: HOME HEALTHCARE | Age: 53
End: 2018-02-22
Payer: MEDICARE

## 2018-02-22 ENCOUNTER — ANTICOAGULATION MONITORING (OUTPATIENT)
Dept: VASCULAR LAB | Facility: MEDICAL CENTER | Age: 53
End: 2018-02-22

## 2018-02-22 DIAGNOSIS — I48.91 ATRIAL FIBRILLATION, CONTROLLED (HCC): ICD-10-CM

## 2018-02-22 LAB — INR PPP: 2.4 (ref 2–3.5)

## 2018-02-22 PROCEDURE — G0299 HHS/HOSPICE OF RN EA 15 MIN: HCPCS

## 2018-02-22 PROCEDURE — 665998 HH PPS REVENUE CREDIT

## 2018-02-22 PROCEDURE — 665999 HH PPS REVENUE DEBIT

## 2018-02-22 NOTE — PROGRESS NOTES
Anticoagulation Summary  As of 2/22/2018    INR goal:   2.0-3.0   TTR:   45.7 % (3 d)   Today's INR:   2.4   Maintenance plan:   10 mg (5 mg x 2) every day   Weekly total:   70 mg   Plan last modified:   Zak GarciaD (2/16/2018)   Next INR check:   2/26/2018   Target end date:   Indefinite    Indications    Atrial fibrillation  controlled (CMS-HCC) [I48.91]  Atrial flutter with rapid ventricular response (CMS-HCC) (Resolved) [I48.92]             Anticoagulation Episode Summary     INR check location:       Preferred lab:       Send INR reminders to:       Comments:   [facility]      Anticoagulation Care Providers     Provider Role Specialty Phone number    Jett Bedoya M.D. Referring Cardiology [phone]    [facility] Anticoagulation Services Responsible  [phone]        Anticoagulation Patient Findings  Patient Findings     Negatives:   Signs/symptoms of thrombosis, Signs/symptoms of bleeding, Laboratory test error suspected, Change in health, Change in alcohol use, Change in activity, Upcoming invasive procedure, Emergency department visit, Upcoming dental procedure, Missed doses, Extra doses, Change in medications, Change in diet/appetite, Hospital admission, Bruising, Other complaints        Spoke with patients caregiver today regarding therapeutic INR of 2.4.  Patient denies any signs/symptoms of bruising or bleeding or any changes in diet and medications.  Instructed patient to call clinic with any questions or concerns.  Pt is to continue with current warfarin dosing regimen.  Follow up in 4 days, to reduce risk of adverse events related to this high risk medication,  Warfarin.    Johnathan Chen, ZakD

## 2018-02-23 PROCEDURE — 665998 HH PPS REVENUE CREDIT

## 2018-02-23 PROCEDURE — 665999 HH PPS REVENUE DEBIT

## 2018-02-24 PROCEDURE — 665998 HH PPS REVENUE CREDIT

## 2018-02-24 PROCEDURE — 665999 HH PPS REVENUE DEBIT

## 2018-02-25 VITALS
SYSTOLIC BLOOD PRESSURE: 94 MMHG | RESPIRATION RATE: 19 BRPM | DIASTOLIC BLOOD PRESSURE: 62 MMHG | BODY MASS INDEX: 34.86 KG/M2 | HEART RATE: 68 BPM | TEMPERATURE: 97.2 F | OXYGEN SATURATION: 96 % | WEIGHT: 294 LBS

## 2018-02-25 PROCEDURE — 665998 HH PPS REVENUE CREDIT

## 2018-02-25 PROCEDURE — 665999 HH PPS REVENUE DEBIT

## 2018-02-25 SDOH — ECONOMIC STABILITY: HOUSING INSECURITY: UNSAFE APPLIANCES: 0

## 2018-02-25 SDOH — ECONOMIC STABILITY: HOUSING INSECURITY: UNSAFE COOKING RANGE AREA: 0

## 2018-02-25 ASSESSMENT — ENCOUNTER SYMPTOMS
VOMITING: NONE NOTED
NAUSEA: NONE NOTED

## 2018-02-26 ENCOUNTER — OFFICE VISIT (OUTPATIENT)
Dept: CARDIOLOGY | Facility: MEDICAL CENTER | Age: 53
End: 2018-02-26
Payer: MEDICARE

## 2018-02-26 ENCOUNTER — HOME CARE VISIT (OUTPATIENT)
Dept: HOME HEALTH SERVICES | Facility: HOME HEALTHCARE | Age: 53
End: 2018-02-26
Payer: MEDICARE

## 2018-02-26 VITALS
HEIGHT: 77 IN | DIASTOLIC BLOOD PRESSURE: 48 MMHG | WEIGHT: 289 LBS | OXYGEN SATURATION: 94 % | HEART RATE: 70 BPM | BODY MASS INDEX: 34.12 KG/M2 | SYSTOLIC BLOOD PRESSURE: 120 MMHG

## 2018-02-26 DIAGNOSIS — I25.5 ISCHEMIC CARDIOMYOPATHY: ICD-10-CM

## 2018-02-26 DIAGNOSIS — E78.5 DYSLIPIDEMIA: ICD-10-CM

## 2018-02-26 DIAGNOSIS — M79.89 LEG SWELLING: ICD-10-CM

## 2018-02-26 DIAGNOSIS — R06.09 DYSPNEA ON EXERTION: ICD-10-CM

## 2018-02-26 DIAGNOSIS — I51.89 LEFT VENTRICULAR SYSTOLIC DYSFUNCTION, NYHA CLASS 3: ICD-10-CM

## 2018-02-26 DIAGNOSIS — I50.20 ACC/AHA STAGE C SYSTOLIC HEART FAILURE (HCC): ICD-10-CM

## 2018-02-26 DIAGNOSIS — I50.21 ACUTE SYSTOLIC HEART FAILURE (HCC): ICD-10-CM

## 2018-02-26 DIAGNOSIS — I10 HTN (HYPERTENSION), MALIGNANT: ICD-10-CM

## 2018-02-26 DIAGNOSIS — Z79.899 HIGH RISK MEDICATION USE: ICD-10-CM

## 2018-02-26 PROCEDURE — 94618 PULMONARY STRESS TESTING: CPT | Performed by: INTERNAL MEDICINE

## 2018-02-26 PROCEDURE — G0299 HHS/HOSPICE OF RN EA 15 MIN: HCPCS

## 2018-02-26 PROCEDURE — 99215 OFFICE O/P EST HI 40 MIN: CPT | Mod: 25 | Performed by: INTERNAL MEDICINE

## 2018-02-26 PROCEDURE — 665998 HH PPS REVENUE CREDIT

## 2018-02-26 PROCEDURE — 665999 HH PPS REVENUE DEBIT

## 2018-02-26 RX ORDER — FUROSEMIDE 40 MG/1
40 TABLET ORAL 2 TIMES DAILY
Qty: 60 TAB | Refills: 3 | Status: SHIPPED | OUTPATIENT
Start: 2018-02-26 | End: 2018-03-19 | Stop reason: CLARIF

## 2018-02-26 RX ORDER — METOPROLOL SUCCINATE 100 MG/1
100 TABLET, EXTENDED RELEASE ORAL DAILY
Qty: 90 TAB | Refills: 3 | Status: SHIPPED | OUTPATIENT
Start: 2018-02-26 | End: 2018-03-19 | Stop reason: CLARIF

## 2018-02-26 RX ORDER — LISINOPRIL 10 MG/1
10 TABLET ORAL DAILY
COMMUNITY
End: 2018-03-01 | Stop reason: SDUPTHER

## 2018-02-26 ASSESSMENT — MINNESOTA LIVING WITH HEART FAILURE QUESTIONNAIRE (MLHF)
DIFFICULTY TO CONCENTRATE OR REMEMBERING THINGS: 0
DIFFICULTY WITH SEXUAL ACTIVITIES: 5
TIRED, FATIGUED OR LOW ON ENERGY: 2
DIFFICULTY GOING AWAY FROM HOME: 1
FEELING LIKE A BURDEN TO FAMILY AND FRIENDS: 0
DIFFICULTY SLEEPING WELL AT NIGHT: 5
COSTING YOU MONEY FOR MEDICAL CARE: 3
HAVING TO SIT OR LIE DOWN DURING THE DAY: 3
MAKING YOU FEEL DEPRESSED: 0
WALKING ABOUT OR CLIMBING STAIRS DIFFICULT: 5
GIVING YOU SIDE EFFECTS FROM TREATMENTS: 0
MAKING YOU SHORT OF BREATH: 2
DIFFICULTY WORKING TO EARN A LIVING: 0
TOTAL_SCORE: 37
LOSS OF SELF CONTROL IN YOUR LIFE: 0
SWELLING IN ANKLES OR LEGS: 0
DIFFICULTY WITH RECREATIONAL PASTIMES, SPORTS, HOBBIES: 0
WORKING AROUND THE HOUSE OR YARD DIFFICULT: 0
MAKING YOU WORRY: 3
EATING LESS FOODS YOU LIKE: 4
DIFFICULTY SOCIALIZING WITH FAMILY OR FRIENDS: 0
MAKING YOU STAY IN A HOSPITAL: 4

## 2018-02-26 ASSESSMENT — ENCOUNTER SYMPTOMS
FALLS: 0
DIZZINESS: 0
COUGH: 0
BRUISES/BLEEDS EASILY: 0
MEMORY LOSS: 0
DEPRESSION: 0
MYALGIAS: 0
FEVER: 0
ABDOMINAL PAIN: 0
SHORTNESS OF BREATH: 1
HEADACHES: 0
DOUBLE VISION: 0
SENSORY CHANGE: 0
BLURRED VISION: 0
DIAPHORESIS: 0
PALPITATIONS: 0

## 2018-02-26 ASSESSMENT — NEW YORK HEART ASSOCIATION (NYHA) CLASSIFICATION: NYHA FUNCTIONAL CLASS: CLASS III

## 2018-02-26 ASSESSMENT — 6 MINUTE WALK TEST (6MWT): TOTAL DISTANCE WALKED (METERS): 146.3

## 2018-02-26 NOTE — PROGRESS NOTES
Subjective:   Joshua Medrano is a 52 y.o. male who presents today for cardiac care and evaluation and a heart failure clinic after recent hospital stay for heart failure exacerbation. Patient does have a history of ischemic cardiomyopathy for which he underwent CABG x 2 (LIMA to LAD and SVG to OM) in December 2017. His left ventricular systolic function was found to be 25%.    Patient still gets winded with daily living activities and exertion. No symptoms at rest.    Patient was able to complete 146 m during his 6 minute walk test. his O2 saturation at baseline was 94% and at the end of the test, the O2 saturation was 99%. he reported 2 level of dyspnea on Elsa scale.      Chief Complaint: dyspnea.    Past Medical History:   Diagnosis Date   • ASTHMA    • Chronic obstructive pulmonary disease (CMS-HCC)    • Diabetes      Past Surgical History:   Procedure Laterality Date   • THORACOSCOPY Left 1/25/2018    Procedure: THORACOSCOPY- PLEURODESIS ;  Surgeon: Chandu Paez M.D.;  Location: SURGERY [facility];  Service: General   • MULTIPLE CORONARY ARTERY BYPASS ENDO VEIN HARVEST  12/22/2017    Procedure: MULTIPLE CORONARY ARTERY BYPASS ENDO VEIN HARVEST X2;  Surgeon: Kiel Ash M.D.;  Location: SURGERY [facility];  Service: Cardiothoracic   • JIM  12/22/2017    Procedure: JIM;  Surgeon: Kiel Ash M.D.;  Location: SURGERY [facility];  Service: Cardiothoracic   • OTHER ABDOMINAL SURGERY       Family History   Problem Relation Age of Onset   • Diabetes Mother    • Diabetes Father      History   Smoking Status   • Former Smoker   • Packs/day: 0.50   • Years: 20.00   • Types: Cigarettes   • Quit date: 12/13/2017   Smokeless Tobacco   • Never Used     Allergies   Allergen Reactions   • Erythromycin Anaphylaxis   • Cillins [Penicillins] Rash     As a child, tolerated cefepime (12/2017), ceftriaxone (1/2018), cefazolin (12/2017)   • Shellfish Allergy Anaphylaxis     Pt stated that he is allergic  to all seafood, will develop a rash and says that rash will clear up with benadryl     Outpatient Encounter Prescriptions as of 2/26/2018   Medication Sig Dispense Refill   • lisinopril (PRINIVIL) 10 MG Tab Take 10 mg by mouth every day.     • furosemide (LASIX) 40 MG Tab Take 1 Tab by mouth 2 Times a Day. 60 Tab 3   • metoprolol SR (TOPROL XL) 100 MG TABLET SR 24 HR Take 1 Tab by mouth every day. 90 Tab 3   • Non Formulary Request Inhale 2 L/min by mouth as needed (shortness of breath). Oxygen via nasal cannula intermittent     • ipratropium-albuterol (DUONEB) 0.5-2.5 (3) MG/3ML nebulizer solution 3 mL by Nebulization route every four hours as needed for Shortness of Breath. 30 Bullet 0   • albuterol 108 (90 Base) MCG/ACT Aero Soln inhalation aerosol Inhale 2 Puffs by mouth every 6 hours as needed for Shortness of Breath. 1 Inhaler 10   • potassium chloride (KLOR-CON) 20 MEQ Pack Take 1 Packet by mouth 2 times a day. 30 Packet 0   • amiodarone (CORDARONE) 200 MG Tab Take 1 Tab by mouth every day. 30 Tab 0   • digoxin (LANOXIN) 125 MCG Tab Take 1 Tab by mouth every day at 6 PM. 30 Tab 0   • rosuvastatin (CRESTOR) 40 MG tablet Take 1 Tab by mouth every evening. 30 Tab 0   • spironolactone (ALDACTONE) 50 MG Tab Take 1 Tab by mouth every day. 30 Tab 0   • warfarin (COUMADIN) 5 MG Tab Take 1 Tab by mouth every day. 30 Tab 0   • aspirin (ASA) 81 MG Chew Tab chewable tablet Take 1 Tab by mouth every day. 100 Tab    • [DISCONTINUED] captopril (CAPOTEN) 25 MG Tab Take 25 mg by mouth 3 times a day.     • [DISCONTINUED] furosemide (LASIX) 40 MG Tab Take 1 Tab by mouth every day. 30 Tab 0     No facility-administered encounter medications on file as of 2/26/2018.      Review of Systems   Constitutional: Negative for diaphoresis and fever.   HENT: Negative for nosebleeds.    Eyes: Negative for blurred vision and double vision.   Respiratory: Positive for shortness of breath. Negative for cough.    Cardiovascular: Negative for  "chest pain and palpitations.   Gastrointestinal: Negative for abdominal pain.   Genitourinary: Negative for dysuria and frequency.   Musculoskeletal: Negative for falls and myalgias.   Skin: Negative for rash.   Neurological: Negative for dizziness, sensory change and headaches.   Endo/Heme/Allergies: Does not bruise/bleed easily.   Psychiatric/Behavioral: Negative for depression and memory loss.        Objective:   /48   Pulse 70   Ht 1.956 m (6' 5\")   Wt (!) 131.1 kg (289 lb)   SpO2 94%   BMI 34.27 kg/m²     Physical Exam   Constitutional: He is oriented to person, place, and time. No distress.   HENT:   Head: Normocephalic and atraumatic.   Eyes: EOM are normal.   Neck: Normal range of motion. No JVD present.   Cardiovascular: Normal rate, regular rhythm, normal heart sounds and intact distal pulses.  Exam reveals no gallop and no friction rub.    No murmur heard.  Bilateral femoral pulses are 2+, bilateral dorsalis pedis pulses are 2+, bilateral posterior tibialis pulses are 2+.   Pulmonary/Chest: No respiratory distress. He has no wheezes. He has no rales. He exhibits no tenderness.   Abdominal: Soft. Bowel sounds are normal. There is no tenderness. There is no rebound and no guarding.   The is no presence of abdominal bruits   Musculoskeletal: Normal range of motion. He exhibits no edema or tenderness.   Neurological: He is alert and oriented to person, place, and time.   Skin: Skin is warm and dry.   Psychiatric: He has a normal mood and affect.   Nursing note and vitals reviewed.      Assessment:     1. ACC/AHA stage C systolic heart failure (CMS-HCC)  [place] PULMONARY STRESS TESTING 6 MIN WALK    furosemide (LASIX) 40 MG Tab    metoprolol SR (TOPROL XL) 100 MG TABLET SR 24 HR    BASIC METABOLIC PANEL    [facility] EKG (Clinic Performed)   2. Left ventricular systolic dysfunction, NYHA class 3  [place] PULMONARY STRESS TESTING 6 MIN WALK    furosemide (LASIX) 40 MG Tab    metoprolol SR (TOPROL XL) 100 MG " TABLET SR 24 HR    BASIC METABOLIC PANEL    [facility] E[facility] (Clinic Performed)   3. Ischemic cardiomyopathy  furosemide (LASIX) 40 MG Tab   4. HTN (hypertension), malignant     5. Dyslipidemia     6. High risk medication use  BASIC METABOLIC PANEL   7. Dyspnea on exertion  NE PULMONARY STRESS TESTING 6 MIN WALK   8. Leg swelling  furosemide (LASIX) 40 MG Tab   9. Acute systolic heart failure (CMS-HCC)         Medical Decision Making:  Today's Assessment / Status / Plan:   Still volume up.  Increase furosemide to 40 mg twice a day.    Start Toprol  mg once a day. Continue Spironolactone 50 mg po daily.    Will continue to closely monitor for side effects of patient's high risk medication(s) including liver, renal function and electrolytes.    I will see patient back in our Heart Failure Clinic with lab tests and studies results in 1 weeks.    I thank you Dr. Le for referring patient to our Heart Failure Clinic today.

## 2018-02-26 NOTE — LETTER
[facility]   [address]  [place] [address]  Phone: [phone]  Fax: [phone]              Joshua Medrano  1965    Encounter Date: 2/26/2018    Jett Bedoya M.D.          PROGRESS NOTE:  Subjective:   Joshua Medrano is a 52 y.o. male who presents today for cardiac care and evaluation and a heart failure clinic after recent hospital stay for heart failure exacerbation. Patient does have a history of ischemic cardiomyopathy for which he underwent CABG x 2 (LIMA to LAD and SVG to OM) in December 2017. His left ventricular systolic function was found to be 25%.    Patient still gets winded with daily living activities and exertion. No symptoms at rest.    Patient was able to complete 146 m during his 6 minute walk test. his O2 saturation at baseline was 94% and at the end of the test, the O2 saturation was 99%. he reported 2 level of dyspnea on Elsa scale.      Chief Complaint: dyspnea.    Past Medical History:   Diagnosis Date   • ASTHMA    • Chronic obstructive pulmonary disease (CMS-HCC)    • Diabetes      Past Surgical History:   Procedure Laterality Date   • THORACOSCOPY Left 1/25/2018    Procedure: THORACOSCOPY- PLEURODESIS ;  Surgeon: Chandu Paez M.D.;  Location: SURGERY [facility];  Service: General   • MULTIPLE CORONARY ARTERY BYPASS ENDO VEIN HARVEST  12/22/2017    Procedure: MULTIPLE CORONARY ARTERY BYPASS ENDO VEIN HARVEST X2;  Surgeon: Kiel Ash M.D.;  Location: SURGERY [facility];  Service: Cardiothoracic   • JIM  12/22/2017    Procedure: JIM;  Surgeon: Kiel Ash M.D.;  Location: SURGERY [facility];  Service: Cardiothoracic   • OTHER ABDOMINAL SURGERY       Family History   Problem Relation Age of Onset   • Diabetes Mother    • Diabetes Father      History   Smoking Status   • Former Smoker   • Packs/day: 0.50   • Years: 20.00   • Types: Cigarettes   • Quit date: 12/13/2017   Smokeless Tobacco    • Never Used     Allergies   Allergen Reactions   • Erythromycin Anaphylaxis   • Cillins [Penicillins] Rash     As a child, tolerated cefepime (12/2017), ceftriaxone (1/2018), cefazolin (12/2017)   • Shellfish Allergy Anaphylaxis     Pt stated that he is allergic to all seafood, will develop a rash and says that rash will clear up with benadryl     Outpatient Encounter Prescriptions as of 2/26/2018   Medication Sig Dispense Refill   • lisinopril (PRINIVIL) 10 MG Tab Take 10 mg by mouth every day.     • furosemide (LASIX) 40 MG Tab Take 1 Tab by mouth 2 Times a Day. 60 Tab 3   • metoprolol SR (TOPROL XL) 100 MG TABLET SR 24 HR Take 1 Tab by mouth every day. 90 Tab 3   • Non Formulary Request Inhale 2 L/min by mouth as needed (shortness of breath). Oxygen via nasal cannula intermittent     • ipratropium-albuterol (DUONEB) 0.5-2.5 (3) MG/3ML nebulizer solution 3 mL by Nebulization route every four hours as needed for Shortness of Breath. 30 Bullet 0   • albuterol 108 (90 Base) MCG/ACT Aero Soln inhalation aerosol Inhale 2 Puffs by mouth every 6 hours as needed for Shortness of Breath. 1 Inhaler 10   • potassium chloride (KLOR-CON) 20 MEQ Pack Take 1 Packet by mouth 2 times a day. 30 Packet 0   • amiodarone (CORDARONE) 200 MG Tab Take 1 Tab by mouth every day. 30 Tab 0   • digoxin (LANOXIN) 125 MCG Tab Take 1 Tab by mouth every day at 6 PM. 30 Tab 0   • rosuvastatin (CRESTOR) 40 MG tablet Take 1 Tab by mouth every evening. 30 Tab 0   • spironolactone (ALDACTONE) 50 MG Tab Take 1 Tab by mouth every day. 30 Tab 0   • warfarin (COUMADIN) 5 MG Tab Take 1 Tab by mouth every day. 30 Tab 0   • aspirin (ASA) 81 MG Chew Tab chewable tablet Take 1 Tab by mouth every day. 100 Tab    • [DISCONTINUED] captopril (CAPOTEN) 25 MG Tab Take 25 mg by mouth 3 times a day.     • [DISCONTINUED] furosemide (LASIX) 40 MG Tab Take 1 Tab by mouth every day. 30 Tab 0     No facility-administered encounter medications on file as of 2/26/2018.     "    Review of Systems   Constitutional: Negative for diaphoresis and fever.   HENT: Negative for nosebleeds.    Eyes: Negative for blurred vision and double vision.   Respiratory: Positive for shortness of breath. Negative for cough.    Cardiovascular: Negative for chest pain and palpitations.   Gastrointestinal: Negative for abdominal pain.   Genitourinary: Negative for dysuria and frequency.   Musculoskeletal: Negative for falls and myalgias.   Skin: Negative for rash.   Neurological: Negative for dizziness, sensory change and headaches.   Endo/Heme/Allergies: Does not bruise/bleed easily.   Psychiatric/Behavioral: Negative for depression and memory loss.        Objective:   /48   Pulse 70   Ht 1.956 m (6' 5\")   Wt (!) 131.1 kg (289 lb)   SpO2 94%   BMI 34.27 kg/m²      Physical Exam   Constitutional: He is oriented to person, place, and time. No distress.   HENT:   Head: Normocephalic and atraumatic.   Eyes: EOM are normal.   Neck: Normal range of motion. No JVD present.   Cardiovascular: Normal rate, regular rhythm, normal heart sounds and intact distal pulses.  Exam reveals no gallop and no friction rub.    No murmur heard.  Bilateral femoral pulses are 2+, bilateral dorsalis pedis pulses are 2+, bilateral posterior tibialis pulses are 2+.   Pulmonary/Chest: No respiratory distress. He has no wheezes. He has no rales. He exhibits no tenderness.   Abdominal: Soft. Bowel sounds are normal. There is no tenderness. There is no rebound and no guarding.   The is no presence of abdominal bruits   Musculoskeletal: Normal range of motion. He exhibits no edema or tenderness.   Neurological: He is alert and oriented to person, place, and time.   Skin: Skin is warm and dry.   Psychiatric: He has a normal mood and affect.   Nursing note and vitals reviewed.      Assessment:     1. ACC/AHA stage C systolic heart failure (CMS-HCC)  [place] PULMONARY STRESS TESTING 6 MIN WALK    furosemide (LASIX) 40 MG Tab    metoprolol " SR (TOPROL XL) 100 MG TABLET SR 24 HR    BASIC METABOLIC PANEL    [facility] EKG (Clinic Performed)   2. Left ventricular systolic dysfunction, NYHA class 3  [place] PULMONARY STRESS TESTING 6 MIN WALK    furosemide (LASIX) 40 MG Tab    metoprolol SR (TOPROL XL) 100 MG TABLET SR 24 HR    BASIC METABOLIC PANEL    [facility] EKG (Clinic Performed)   3. Ischemic cardiomyopathy  furosemide (LASIX) 40 MG Tab   4. HTN (hypertension), malignant     5. Dyslipidemia     6. High risk medication use  BASIC METABOLIC PANEL   7. Dyspnea on exertion  [place] PULMONARY STRESS TESTING 6 MIN WALK   8. Leg swelling  furosemide (LASIX) 40 MG Tab   9. Acute systolic heart failure (CMS-HCC)         Medical Decision Making:  Today's Assessment / Status / Plan:   Still volume up.  Increase furosemide to 40 mg twice a day.    Start Toprol  mg once a day. Continue Spironolactone 50 mg po daily.    Will continue to closely monitor for side effects of patient's high risk medication(s) including liver, renal function and electrolytes.    I will see patient back in our Heart Failure Clinic with lab tests and studies results in 1 weeks.    I thank you Dr. Le for referring patient to our Heart Failure Clinic today.        Nam Le M.D.  [address] [address]  VIA In Basket

## 2018-02-27 ENCOUNTER — ANTICOAGULATION MONITORING (OUTPATIENT)
Dept: VASCULAR LAB | Facility: MEDICAL CENTER | Age: 53
End: 2018-02-27

## 2018-02-27 DIAGNOSIS — I48.91 ATRIAL FIBRILLATION, CONTROLLED (HCC): ICD-10-CM

## 2018-02-27 LAB — INR PPP: 2.5 (ref 2–3.5)

## 2018-02-27 PROCEDURE — 665998 HH PPS REVENUE CREDIT

## 2018-02-27 PROCEDURE — 665999 HH PPS REVENUE DEBIT

## 2018-02-27 NOTE — PROGRESS NOTES
Anticoagulation Summary  As of 2/27/2018    INR goal:   2.0-3.0   TTR:   79.6 % (1.1 wk)   Today's INR:   2.5   Maintenance plan:   10 mg (5 mg x 2) every day   Weekly total:   70 mg   Plan last modified:   Johnathan Chen, PharmD (2/16/2018)   Next INR check:   3/6/2018   Target end date:   Indefinite    Indications    Atrial fibrillation  controlled (CMS-HCC) [I48.91]  Atrial flutter with rapid ventricular response (CMS-HCC) (Resolved) [I48.92]             Anticoagulation Episode Summary     INR check location:       Preferred lab:       Send INR reminders to:       Comments:   [facility]       Anticoagulation Care Providers     Provider Role Specialty Phone number    Jett Bedoya M.D. Referring Cardiology [phone]    [facility] Anticoagulation Services Responsible  [phone]        Anticoagulation Patient Findings        Spoke with patient by phone. Patient is therapeutic.      Changes to current medical/health status since last appt: none.   Denies signs/symptoms of bleeding and/or thrombosis since the last appt.   Denies any interval changes to diet  Denies any interval changes to medications since last appt.   Denies any complications or cost restrictions with current therapy  Follow up appointment in 1 week(s).      Continue to take 10 mg daily, test in 1 week with [facility] .       Patient is on a high risk medication and therefore requires close monitoring and follow up.   The patient will go to the ER for falls with a head injury,  blood in urine or stool or any bleeding that last longer than 20 min.          MIRTHA Dougherty.

## 2018-02-28 VITALS
DIASTOLIC BLOOD PRESSURE: 60 MMHG | TEMPERATURE: 97.1 F | RESPIRATION RATE: 18 BRPM | SYSTOLIC BLOOD PRESSURE: 100 MMHG | HEART RATE: 67 BPM | BODY MASS INDEX: 34.29 KG/M2 | WEIGHT: 289.2 LBS | OXYGEN SATURATION: 99 %

## 2018-02-28 PROCEDURE — 665998 HH PPS REVENUE CREDIT

## 2018-02-28 PROCEDURE — 665999 HH PPS REVENUE DEBIT

## 2018-02-28 SDOH — ECONOMIC STABILITY: HOUSING INSECURITY: UNSAFE COOKING RANGE AREA: 0

## 2018-02-28 SDOH — ECONOMIC STABILITY: HOUSING INSECURITY: UNSAFE APPLIANCES: 0

## 2018-02-28 ASSESSMENT — ENCOUNTER SYMPTOMS
SHORTNESS OF BREATH: T
NAUSEA: NONE PER PATIENT

## 2018-03-01 ENCOUNTER — HOME CARE VISIT (OUTPATIENT)
Dept: HOME HEALTH SERVICES | Facility: HOME HEALTHCARE | Age: 53
End: 2018-03-01
Payer: MEDICARE

## 2018-03-01 ENCOUNTER — TELEPHONE (OUTPATIENT)
Dept: CARDIOLOGY | Facility: MEDICAL CENTER | Age: 53
End: 2018-03-01

## 2018-03-01 LAB
MYCOBACTERIUM SPEC CULT: NORMAL
MYCOBACTERIUM SPEC CULT: NORMAL
RHODAMINE-AURAMINE STN SPEC: NORMAL
SIGNIFICANT IND 70042: NORMAL
SIGNIFICANT IND 70042: NORMAL
SITE SITE: NORMAL
SITE SITE: NORMAL
SOURCE SOURCE: NORMAL
SOURCE SOURCE: NORMAL

## 2018-03-01 PROCEDURE — 665998 HH PPS REVENUE CREDIT

## 2018-03-01 PROCEDURE — G0299 HHS/HOSPICE OF RN EA 15 MIN: HCPCS

## 2018-03-01 PROCEDURE — 665999 HH PPS REVENUE DEBIT

## 2018-03-01 RX ORDER — AMIODARONE HYDROCHLORIDE 200 MG/1
200 TABLET ORAL DAILY
Qty: 90 TAB | Refills: 3 | Status: SHIPPED | OUTPATIENT
Start: 2018-03-01 | End: 2019-01-04 | Stop reason: SDUPTHER

## 2018-03-01 RX ORDER — SPIRONOLACTONE 50 MG/1
50 TABLET, FILM COATED ORAL DAILY
Qty: 90 TAB | Refills: 3 | Status: ON HOLD | OUTPATIENT
Start: 2018-03-01 | End: 2018-07-27

## 2018-03-01 RX ORDER — ASPIRIN 81 MG/1
81 TABLET, CHEWABLE ORAL DAILY
Qty: 90 TAB | Refills: 3 | Status: ON HOLD | OUTPATIENT
Start: 2018-03-01 | End: 2018-07-27

## 2018-03-01 RX ORDER — DIGOXIN 125 MCG
125 TABLET ORAL DAILY
Qty: 90 TAB | Refills: 3 | Status: ON HOLD | OUTPATIENT
Start: 2018-03-01 | End: 2018-07-27

## 2018-03-01 RX ORDER — ROSUVASTATIN CALCIUM 40 MG/1
40 TABLET, COATED ORAL EVERY EVENING
Qty: 90 TAB | Refills: 3 | Status: SHIPPED | OUTPATIENT
Start: 2018-03-01 | End: 2018-10-07

## 2018-03-01 RX ORDER — LISINOPRIL 10 MG/1
10 TABLET ORAL DAILY
Qty: 90 TAB | Refills: 3 | Status: SHIPPED | OUTPATIENT
Start: 2018-03-01 | End: 2018-03-23

## 2018-03-01 RX ORDER — WARFARIN SODIUM 5 MG/1
TABLET ORAL
Qty: 180 TAB | Refills: 1 | Status: SHIPPED | OUTPATIENT
Start: 2018-03-01 | End: 2018-03-23

## 2018-03-01 NOTE — TELEPHONE ENCOUNTER
"med advice   Received: Today   Message Contents   Shelbi LUZ Smith R.N.   Phone Number: [phone]             TT/lashon     Pt's wife Lisa calling to ask for some med advice with regard to lasix & potassium relative to warfarin.     Please call Lisa [phone].      Returned call. She states pt has been out of coumadin, referred to coumadin clinic. She states they need refills on \"all other medications too\". Advised I would FU with provider to find out exactly what medications patient needs to stay on and would have provider refill as needed. She asks that potassium be changed to pills, explained that on spironolactone it may not be required.    Discussion with Dr. Bedoya re: which meds need to be refilled and continued. DC K+ per MD.   "

## 2018-03-02 PROCEDURE — 665999 HH PPS REVENUE DEBIT

## 2018-03-02 PROCEDURE — 665998 HH PPS REVENUE CREDIT

## 2018-03-02 NOTE — ADDENDUM NOTE
Encounter addended by: Laurie Rosa R.N. on: 3/2/2018  1:08 PM<BR>    Actions taken: Flowsheet accepted

## 2018-03-03 PROCEDURE — 665999 HH PPS REVENUE DEBIT

## 2018-03-03 PROCEDURE — 665998 HH PPS REVENUE CREDIT

## 2018-03-04 VITALS
BODY MASS INDEX: 34.74 KG/M2 | DIASTOLIC BLOOD PRESSURE: 62 MMHG | WEIGHT: 293 LBS | TEMPERATURE: 99 F | SYSTOLIC BLOOD PRESSURE: 120 MMHG | HEART RATE: 73 BPM | RESPIRATION RATE: 18 BRPM | OXYGEN SATURATION: 99 %

## 2018-03-04 PROCEDURE — 665998 HH PPS REVENUE CREDIT

## 2018-03-04 PROCEDURE — 665999 HH PPS REVENUE DEBIT

## 2018-03-04 SDOH — ECONOMIC STABILITY: HOUSING INSECURITY: UNSAFE APPLIANCES: 0

## 2018-03-04 SDOH — ECONOMIC STABILITY: HOUSING INSECURITY: UNSAFE COOKING RANGE AREA: 0

## 2018-03-04 SDOH — ECONOMIC STABILITY: HOUSING INSECURITY: HOME SAFETY: CURRENTLY STAYING AT DAIGHTER'S HOUSE WITH 2 WHITE ENERGETIC DOGS THAT NEEDS TO BE PUT AWAY BEFORE VISIT

## 2018-03-04 ASSESSMENT — ENCOUNTER SYMPTOMS: VOMITING: NONE NOTED

## 2018-03-05 ENCOUNTER — HOME CARE VISIT (OUTPATIENT)
Dept: HOME HEALTH SERVICES | Facility: HOME HEALTHCARE | Age: 53
End: 2018-03-05
Payer: MEDICARE

## 2018-03-05 PROCEDURE — 665999 HH PPS REVENUE DEBIT

## 2018-03-05 PROCEDURE — 665998 HH PPS REVENUE CREDIT

## 2018-03-05 PROCEDURE — G0300 HHS/HOSPICE OF LPN EA 15 MIN: HCPCS

## 2018-03-05 SDOH — ECONOMIC STABILITY: HOUSING INSECURITY: UNSAFE APPLIANCES: 0

## 2018-03-05 SDOH — ECONOMIC STABILITY: HOUSING INSECURITY: UNSAFE COOKING RANGE AREA: 0

## 2018-03-05 SDOH — ECONOMIC STABILITY: HOUSING INSECURITY
HOME SAFETY: FIRE ESCAPE PLAN DEVELOPED. PATIENT DOES HAVE FLAMMABLE MATERIALS PRESENT IN THE HOME PRESENTING A FIRE HAZARD. NO EVIDENCE FOUND OF SMOKING MATERIALS PRESENT IN THE HOME.

## 2018-03-05 ASSESSMENT — ENCOUNTER SYMPTOMS: RESPIRATORY SYMPTOMS COMMENTS: PT LUNGS CLEAR IN ALL FIELDS, NO ADVANTAGEOUS SOUND NOTED.

## 2018-03-06 VITALS
SYSTOLIC BLOOD PRESSURE: 108 MMHG | OXYGEN SATURATION: 92 % | TEMPERATURE: 98.7 F | WEIGHT: 293 LBS | HEART RATE: 67 BPM | BODY MASS INDEX: 34.74 KG/M2 | RESPIRATION RATE: 20 BRPM | DIASTOLIC BLOOD PRESSURE: 70 MMHG

## 2018-03-06 PROCEDURE — 665998 HH PPS REVENUE CREDIT

## 2018-03-06 PROCEDURE — 665999 HH PPS REVENUE DEBIT

## 2018-03-07 ENCOUNTER — OFFICE VISIT (OUTPATIENT)
Dept: INTERNAL MEDICINE | Facility: MEDICAL CENTER | Age: 53
End: 2018-03-07
Payer: MEDICARE

## 2018-03-07 VITALS
HEIGHT: 77 IN | TEMPERATURE: 98.3 F | HEART RATE: 79 BPM | BODY MASS INDEX: 35.16 KG/M2 | WEIGHT: 297.8 LBS | OXYGEN SATURATION: 88 % | DIASTOLIC BLOOD PRESSURE: 59 MMHG | SYSTOLIC BLOOD PRESSURE: 93 MMHG

## 2018-03-07 DIAGNOSIS — D72.829 LEUKOCYTOSIS, UNSPECIFIED TYPE: ICD-10-CM

## 2018-03-07 DIAGNOSIS — J45.20 MILD INTERMITTENT ASTHMA WITHOUT COMPLICATION: ICD-10-CM

## 2018-03-07 DIAGNOSIS — J44.9 CHRONIC OBSTRUCTIVE PULMONARY DISEASE, UNSPECIFIED COPD TYPE (HCC): ICD-10-CM

## 2018-03-07 DIAGNOSIS — D64.9 ANEMIA, UNSPECIFIED TYPE: ICD-10-CM

## 2018-03-07 PROCEDURE — 665999 HH PPS REVENUE DEBIT

## 2018-03-07 PROCEDURE — 99213 OFFICE O/P EST LOW 20 MIN: CPT | Mod: GE | Performed by: INTERNAL MEDICINE

## 2018-03-07 PROCEDURE — 665998 HH PPS REVENUE CREDIT

## 2018-03-07 ASSESSMENT — PAIN SCALES - GENERAL: PAINLEVEL: NO PAIN

## 2018-03-07 ASSESSMENT — PATIENT HEALTH QUESTIONNAIRE - PHQ9: CLINICAL INTERPRETATION OF PHQ2 SCORE: 0

## 2018-03-08 PROCEDURE — G0180 MD CERTIFICATION HHA PATIENT: HCPCS | Performed by: INTERNAL MEDICINE

## 2018-03-08 PROCEDURE — 665999 HH PPS REVENUE DEBIT

## 2018-03-08 PROCEDURE — 665998 HH PPS REVENUE CREDIT

## 2018-03-09 PROCEDURE — 665999 HH PPS REVENUE DEBIT

## 2018-03-09 PROCEDURE — 665998 HH PPS REVENUE CREDIT

## 2018-03-10 PROCEDURE — 665998 HH PPS REVENUE CREDIT

## 2018-03-10 PROCEDURE — 665999 HH PPS REVENUE DEBIT

## 2018-03-11 PROCEDURE — 665999 HH PPS REVENUE DEBIT

## 2018-03-11 PROCEDURE — 665998 HH PPS REVENUE CREDIT

## 2018-03-12 ENCOUNTER — ANTICOAGULATION MONITORING (OUTPATIENT)
Dept: VASCULAR LAB | Facility: MEDICAL CENTER | Age: 53
End: 2018-03-12

## 2018-03-12 ENCOUNTER — HOME CARE VISIT (OUTPATIENT)
Dept: HOME HEALTH SERVICES | Facility: HOME HEALTHCARE | Age: 53
End: 2018-03-12
Payer: MEDICARE

## 2018-03-12 DIAGNOSIS — I48.91 ATRIAL FIBRILLATION, CONTROLLED (HCC): ICD-10-CM

## 2018-03-12 LAB — INR PPP: 3.3 (ref 2–3.5)

## 2018-03-12 PROCEDURE — G0496 LPN CARE TRAIN/EDU IN HH: HCPCS

## 2018-03-12 PROCEDURE — 665998 HH PPS REVENUE CREDIT

## 2018-03-12 PROCEDURE — 665999 HH PPS REVENUE DEBIT

## 2018-03-13 VITALS
OXYGEN SATURATION: 90 % | DIASTOLIC BLOOD PRESSURE: 64 MMHG | WEIGHT: 294 LBS | RESPIRATION RATE: 18 BRPM | SYSTOLIC BLOOD PRESSURE: 108 MMHG | TEMPERATURE: 98.4 F | HEART RATE: 68 BPM | BODY MASS INDEX: 34.86 KG/M2

## 2018-03-13 PROCEDURE — 665999 HH PPS REVENUE DEBIT

## 2018-03-13 PROCEDURE — 665998 HH PPS REVENUE CREDIT

## 2018-03-13 SDOH — ECONOMIC STABILITY: HOUSING INSECURITY: UNSAFE COOKING RANGE AREA: 0

## 2018-03-13 SDOH — ECONOMIC STABILITY: HOUSING INSECURITY: UNSAFE APPLIANCES: 0

## 2018-03-13 ASSESSMENT — ENCOUNTER SYMPTOMS
RESPIRATORY SYMPTOMS COMMENTS: PT LUNGS CLEAR IN ALL FIELDS, NO ADVANTAGEOUS SOUND NOTED. PT HAS CHRONIC DIMINISHED SOUNDS IN LOWER LOBES DUE TO DISEASE PROCESS, NO CHANGES FROM BASELINE.

## 2018-03-14 ENCOUNTER — HOSPITAL ENCOUNTER (OUTPATIENT)
Dept: LAB | Facility: MEDICAL CENTER | Age: 53
End: 2018-03-14
Attending: STUDENT IN AN ORGANIZED HEALTH CARE EDUCATION/TRAINING PROGRAM
Payer: MEDICARE

## 2018-03-14 ENCOUNTER — HOSPITAL ENCOUNTER (OUTPATIENT)
Dept: OTHER | Facility: MEDICAL CENTER | Age: 53
End: 2018-03-14
Attending: STUDENT IN AN ORGANIZED HEALTH CARE EDUCATION/TRAINING PROGRAM
Payer: MEDICARE

## 2018-03-14 ENCOUNTER — HOSPITAL ENCOUNTER (OUTPATIENT)
Dept: LAB | Facility: MEDICAL CENTER | Age: 53
End: 2018-03-14
Attending: INTERNAL MEDICINE
Payer: MEDICARE

## 2018-03-14 DIAGNOSIS — I48.91 ATRIAL FIBRILLATION, CONTROLLED (HCC): ICD-10-CM

## 2018-03-14 DIAGNOSIS — I51.89 LEFT VENTRICULAR SYSTOLIC DYSFUNCTION, NYHA CLASS 3: ICD-10-CM

## 2018-03-14 DIAGNOSIS — I50.20 ACC/AHA STAGE C SYSTOLIC HEART FAILURE (HCC): ICD-10-CM

## 2018-03-14 DIAGNOSIS — D72.829 LEUKOCYTOSIS, UNSPECIFIED TYPE: ICD-10-CM

## 2018-03-14 DIAGNOSIS — Z79.899 HIGH RISK MEDICATION USE: ICD-10-CM

## 2018-03-14 DIAGNOSIS — D64.9 ANEMIA, UNSPECIFIED TYPE: ICD-10-CM

## 2018-03-14 LAB
ALBUMIN SERPL BCP-MCNC: 3.6 G/DL (ref 3.2–4.9)
ALBUMIN/GLOB SERPL: 0.8 G/DL
ALP SERPL-CCNC: 87 U/L (ref 30–99)
ALT SERPL-CCNC: 11 U/L (ref 2–50)
ANION GAP SERPL CALC-SCNC: 11 MMOL/L (ref 0–11.9)
ANION GAP SERPL CALC-SCNC: 12 MMOL/L (ref 0–11.9)
ANISOCYTOSIS BLD QL SMEAR: ABNORMAL
AST SERPL-CCNC: 16 U/L (ref 12–45)
BASOPHILS # BLD AUTO: 1.3 % (ref 0–1.8)
BASOPHILS # BLD: 0.21 K/UL (ref 0–0.12)
BILIRUB SERPL-MCNC: 0.4 MG/DL (ref 0.1–1.5)
BUN SERPL-MCNC: 39 MG/DL (ref 8–22)
BUN SERPL-MCNC: 39 MG/DL (ref 8–22)
CALCIUM SERPL-MCNC: 9.4 MG/DL (ref 8.5–10.5)
CALCIUM SERPL-MCNC: 9.5 MG/DL (ref 8.5–10.5)
CHLORIDE SERPL-SCNC: 100 MMOL/L (ref 96–112)
CHLORIDE SERPL-SCNC: 99 MMOL/L (ref 96–112)
CO2 SERPL-SCNC: 23 MMOL/L (ref 20–33)
CO2 SERPL-SCNC: 23 MMOL/L (ref 20–33)
COMMENT 1642: NORMAL
CREAT SERPL-MCNC: 1.32 MG/DL (ref 0.5–1.4)
CREAT SERPL-MCNC: 1.33 MG/DL (ref 0.5–1.4)
EOSINOPHIL # BLD AUTO: 0.64 K/UL (ref 0–0.51)
EOSINOPHIL NFR BLD: 4 % (ref 0–6.9)
ERYTHROCYTE [DISTWIDTH] IN BLOOD BY AUTOMATED COUNT: 56.6 FL (ref 35.9–50)
FERRITIN SERPL-MCNC: 26.6 NG/ML (ref 22–322)
GLOBULIN SER CALC-MCNC: 4.5 G/DL (ref 1.9–3.5)
GLUCOSE SERPL-MCNC: 85 MG/DL (ref 65–99)
GLUCOSE SERPL-MCNC: 85 MG/DL (ref 65–99)
HCT VFR BLD AUTO: 32.1 % (ref 42–52)
HGB BLD-MCNC: 8.3 G/DL (ref 14–18)
HYPOCHROMIA BLD QL SMEAR: ABNORMAL
IMM GRANULOCYTES # BLD AUTO: 0.34 K/UL (ref 0–0.11)
IMM GRANULOCYTES NFR BLD AUTO: 2.1 % (ref 0–0.9)
IRON SATN MFR SERPL: 4 % (ref 15–55)
IRON SERPL-MCNC: 16 UG/DL (ref 50–180)
LYMPHOCYTES # BLD AUTO: 1.11 K/UL (ref 1–4.8)
LYMPHOCYTES NFR BLD: 6.9 % (ref 22–41)
MCH RBC QN AUTO: 18.8 PG (ref 27–33)
MCHC RBC AUTO-ENTMCNC: 25.9 G/DL (ref 33.7–35.3)
MCV RBC AUTO: 72.6 FL (ref 81.4–97.8)
MICROCYTES BLD QL SMEAR: ABNORMAL
MONOCYTES # BLD AUTO: 1.19 K/UL (ref 0–0.85)
MONOCYTES NFR BLD AUTO: 7.3 % (ref 0–13.4)
MORPHOLOGY BLD-IMP: NORMAL
MORPHOLOGY BLD-IMP: NORMAL
NEUTROPHILS # BLD AUTO: 12.71 K/UL (ref 1.82–7.42)
NEUTROPHILS NFR BLD: 78.4 % (ref 44–72)
NRBC # BLD AUTO: 0.02 K/UL
NRBC BLD-RTO: 0.1 /100 WBC
OVALOCYTES BLD QL SMEAR: NORMAL
PLATELET # BLD AUTO: 383 K/UL (ref 164–446)
PLATELET BLD QL SMEAR: NORMAL
PMV BLD AUTO: 11.3 FL (ref 9–12.9)
POIKILOCYTOSIS BLD QL SMEAR: NORMAL
POTASSIUM SERPL-SCNC: 3.8 MMOL/L (ref 3.6–5.5)
POTASSIUM SERPL-SCNC: 3.9 MMOL/L (ref 3.6–5.5)
PROT SERPL-MCNC: 8.1 G/DL (ref 6–8.2)
RBC # BLD AUTO: 4.42 M/UL (ref 4.7–6.1)
RBC BLD AUTO: PRESENT
SCHISTOCYTES BLD QL SMEAR: NORMAL
SODIUM SERPL-SCNC: 133 MMOL/L (ref 135–145)
SODIUM SERPL-SCNC: 135 MMOL/L (ref 135–145)
TIBC SERPL-MCNC: 357 UG/DL (ref 250–450)
TSH SERPL DL<=0.005 MIU/L-ACNC: 3.28 UIU/ML (ref 0.38–5.33)
WBC # BLD AUTO: 16.2 K/UL (ref 4.8–10.8)

## 2018-03-14 PROCEDURE — 94060 EVALUATION OF WHEEZING: CPT

## 2018-03-14 PROCEDURE — 665999 HH PPS REVENUE DEBIT

## 2018-03-14 PROCEDURE — 36415 COLL VENOUS BLD VENIPUNCTURE: CPT

## 2018-03-14 PROCEDURE — 83550 IRON BINDING TEST: CPT

## 2018-03-14 PROCEDURE — 84443 ASSAY THYROID STIM HORMONE: CPT

## 2018-03-14 PROCEDURE — 83540 ASSAY OF IRON: CPT

## 2018-03-14 PROCEDURE — 94729 DIFFUSING CAPACITY: CPT

## 2018-03-14 PROCEDURE — 85025 COMPLETE CBC W/AUTO DIFF WBC: CPT

## 2018-03-14 PROCEDURE — 82728 ASSAY OF FERRITIN: CPT

## 2018-03-14 PROCEDURE — 80048 BASIC METABOLIC PNL TOTAL CA: CPT

## 2018-03-14 PROCEDURE — 94726 PLETHYSMOGRAPHY LUNG VOLUMES: CPT

## 2018-03-14 PROCEDURE — 80053 COMPREHEN METABOLIC PANEL: CPT

## 2018-03-14 PROCEDURE — 665998 HH PPS REVENUE CREDIT

## 2018-03-14 ASSESSMENT — PULMONARY FUNCTION TESTS
FEV1_LLN: 3.92
FEV1_PERCENT_CHANGE: 7
FVC_PERCENT_PREDICTED: 40
FEV1/FVC_PERCENT_PREDICTED: 95
FVC: 2.45
FEV1/FVC: 73.06
FEV1/FVC: 73
FEV1: 1.79
FVC_LLN: 5.10
FEV1/FVC_PERCENT_LLN: 64
FVC_PERCENT_PREDICTED: 37
FEV1/FVC: 73
FVC: 2.27
FEV1/FVC_PERCENT_LLN: 64
FEV1_PERCENT_PREDICTED: 35
FEV1/FVC: 73
FEV1/FVC_PREDICTED: 77
FEV1/FVC_PERCENT_CHANGE: 114
FEV1: 1.65
FEV1_PERCENT_PREDICTED: 38
FEV1_LLN: 3.92
FEV1/FVC_PERCENT_PREDICTED: 94
FEV1/FVC_PERCENT_PREDICTED: 95
FEV1/FVC_PERCENT_PREDICTED: 94
FEV1/FVC_PERCENT_CHANGE: 0
FVC_PREDICTED: 6.11
FEV1_PERCENT_CHANGE: 8
FEV1/FVC_PERCENT_PREDICTED: 77
FEV1_PREDICTED: 4.69
FVC_LLN: 5.10

## 2018-03-15 DIAGNOSIS — I48.91 ATRIAL FIBRILLATION, UNSPECIFIED TYPE (HCC): ICD-10-CM

## 2018-03-15 PROCEDURE — 665999 HH PPS REVENUE DEBIT

## 2018-03-15 PROCEDURE — 665998 HH PPS REVENUE CREDIT

## 2018-03-16 PROCEDURE — 665998 HH PPS REVENUE CREDIT

## 2018-03-16 PROCEDURE — 665999 HH PPS REVENUE DEBIT

## 2018-03-17 PROCEDURE — 665999 HH PPS REVENUE DEBIT

## 2018-03-17 PROCEDURE — 665998 HH PPS REVENUE CREDIT

## 2018-03-17 NOTE — PROCEDURES
DATE OF TEST:  03/14/2018    Technician notes, the patient had good effort and cooperation.  The results of   the test appeared to be valid, although the ATS standards for end of test was   not met.    SPIROMETRY:  1.  FVC was 2.45 liters, 40% of predicted.  2.  FEV1 was 1.79 liters, 58% of predicted.  3.  FEV1/FVC ratio was 73%.  4.  There was no significant response to bronchodilators.  5.  Flow volume loop was small consistent with restriction.    LUNG VOLUMES:  1.  Total lung capacity was 5.29 liters, 63% of predicted.  2.  Residual volume was 2.63 liters, 104% of predicted.    Diffusion capacity was decreased at 41% of predicted.    IMPRESSION:  The patient had moderate restrictive ventilatory defect with   total lung capacity of 63% of predicted.  The patient also had severe decrease   in diffusion capacity to 41% of predicted.  These test findings do not   support the patient's listed diagnosis of COPD.  These findings are consistent   with interstitial lung disease.  Clinical correlation is required.       ____________________________________     MD MARLENE Beavers / YOSVANY    DD:  03/17/2018 11:29:17  DT:  03/17/2018 12:21:00    D#:  [id number]  Job#:  [id number]

## 2018-03-18 PROCEDURE — 665998 HH PPS REVENUE CREDIT

## 2018-03-18 PROCEDURE — 665999 HH PPS REVENUE DEBIT

## 2018-03-19 ENCOUNTER — HOME CARE VISIT (OUTPATIENT)
Dept: HOME HEALTH SERVICES | Facility: HOME HEALTHCARE | Age: 53
End: 2018-03-19
Payer: MEDICARE

## 2018-03-19 ENCOUNTER — ANTICOAGULATION MONITORING (OUTPATIENT)
Dept: VASCULAR LAB | Facility: MEDICAL CENTER | Age: 53
End: 2018-03-19

## 2018-03-19 ENCOUNTER — OFFICE VISIT (OUTPATIENT)
Dept: CARDIOLOGY | Facility: MEDICAL CENTER | Age: 53
End: 2018-03-19
Payer: MEDICARE

## 2018-03-19 VITALS
WEIGHT: 299 LBS | HEIGHT: 77 IN | DIASTOLIC BLOOD PRESSURE: 56 MMHG | SYSTOLIC BLOOD PRESSURE: 100 MMHG | OXYGEN SATURATION: 90 % | HEART RATE: 74 BPM | BODY MASS INDEX: 35.3 KG/M2

## 2018-03-19 DIAGNOSIS — E78.5 DYSLIPIDEMIA: ICD-10-CM

## 2018-03-19 DIAGNOSIS — I50.20 ACC/AHA STAGE C SYSTOLIC HEART FAILURE (HCC): ICD-10-CM

## 2018-03-19 DIAGNOSIS — I25.5 ISCHEMIC CARDIOMYOPATHY: ICD-10-CM

## 2018-03-19 DIAGNOSIS — I25.10 CORONARY ARTERY DISEASE INVOLVING NATIVE CORONARY ARTERY OF NATIVE HEART WITHOUT ANGINA PECTORIS: ICD-10-CM

## 2018-03-19 DIAGNOSIS — I10 HTN (HYPERTENSION), MALIGNANT: ICD-10-CM

## 2018-03-19 DIAGNOSIS — I50.9 HEART FAILURE, NYHA CLASS 2 (HCC): ICD-10-CM

## 2018-03-19 DIAGNOSIS — I48.91 ATRIAL FIBRILLATION, CONTROLLED (HCC): ICD-10-CM

## 2018-03-19 LAB — INR PPP: 3 (ref 2–3.5)

## 2018-03-19 PROCEDURE — 99214 OFFICE O/P EST MOD 30 MIN: CPT | Performed by: NURSE PRACTITIONER

## 2018-03-19 PROCEDURE — 665997 HH PPS REVENUE ADJ

## 2018-03-19 PROCEDURE — G0162 HHC RN E&M PLAN SVS, 15 MIN: HCPCS

## 2018-03-19 PROCEDURE — 665998 HH PPS REVENUE CREDIT

## 2018-03-19 PROCEDURE — 665999 HH PPS REVENUE DEBIT

## 2018-03-19 RX ORDER — CARVEDILOL 3.12 MG/1
3.12 TABLET ORAL 2 TIMES DAILY WITH MEALS
Qty: 60 TAB | Refills: 11 | Status: SHIPPED | OUTPATIENT
Start: 2018-03-19 | End: 2018-04-06

## 2018-03-19 RX ORDER — TORSEMIDE 20 MG/1
40 TABLET ORAL 2 TIMES DAILY
Qty: 120 TAB | Refills: 11 | Status: SHIPPED | OUTPATIENT
Start: 2018-03-19 | End: 2018-03-23

## 2018-03-19 ASSESSMENT — NEW YORK HEART ASSOCIATION (NYHA) CLASSIFICATION: NYHA FUNCTIONAL CLASS: CLASS II

## 2018-03-19 ASSESSMENT — ENCOUNTER SYMPTOMS
MYALGIAS: 0
PND: 1
PALPITATIONS: 0
SHORTNESS OF BREATH: 1
DIZZINESS: 0
COUGH: 0
ORTHOPNEA: 0
CLAUDICATION: 0
FEVER: 0
ABDOMINAL PAIN: 0

## 2018-03-19 NOTE — PROCEDURES
DATE OF STUDY:  03/14/2018    REASON FOR STUDY:  Patient with mild intermittent asthma and a history of   chronic obstructive lung disease who is a smoker and is obese.  Study was   considered valid although suboptimal.    SPIROMETRY:  FVC 2.27, 37% predicted.  FEV1 1.65, 35% predicted.  No   significant response to bronchodilators.  FEV1/FVC is 73.  MVV is 72, which is   42% predicted.    LUNG VOLUMES:  Vital capacity 2.66, 43% predicted.  Total lung capacity 5.29,   63% predicted.  RV/TLC is 160% predicted.  Diffusion DLCO 41% predicted.    DL/VA is 110% predicted.  Flow volume loops are markedly abnormal with   markedly diminished volumes.    IMPRESSION:  Patient has a mixed disorder with moderate restrictive disease   and severe obstructive disease.  There is significant air trapping.  His   oxygen transfer is diminished, but appears closer to normal when alveolar   volume is accounted for.       ____________________________________     MD FLAKITA DODGE / YOSVANY    DD:  03/19/2018 08:52:10  DT:  03/19/2018 10:51:51    D#:  [id number]  Job#:  [id number]

## 2018-03-19 NOTE — PROGRESS NOTES
Anticoagulation Summary  As of 3/19/2018    INR goal:   2.0-3.0   TTR:   51.8 % (4 wk)   Today's INR:   3.0   Maintenance plan:   5 mg (5 mg x 1) on Mon; 10 mg (5 mg x 2) all other days   Weekly total:   65 mg   Plan last modified:   Rafi Chaudhary, PharmD (3/19/2018)   Next INR check:   4/2/2018   Target end date:   Indefinite    Indications    Atrial fibrillation  controlled (CMS-HCC) [I48.91]  Atrial flutter with rapid ventricular response (CMS-HCC) (Resolved) [I48.92]             Anticoagulation Episode Summary     INR check location:       Preferred lab:       Send INR reminders to:       Comments:   [facility]      Anticoagulation Care Providers     Provider Role Specialty Phone number    Venusritika LISSA Bedoya Referring Cardiology [phone]    [facility] Anticoagulation Services Responsible  [phone]        Anticoagulation Patient Findings        Spoke to patient on the phone.   INR  -therapeutic, but this was after holding a dose last week. I will decrease the dose to make sure it in near the 2.5 goal. They have been DC'd from [facility] and will see us in clinic. [facility] is the best location for them.    Denies signs/symptoms of bleeding and/or thrombosis.   Denies changes to diet or medications.   Follow up appointment in 2 week(s).    Decrease weekly warfarin dose as noted      Rafi Chaudhary, PharmD

## 2018-03-19 NOTE — PATIENT INSTRUCTIONS
Stop Furosemide   Start Torsemide 40 mg Twice a day  Start Carvedilol 3.125 mg Twice a day   BMP in 1 week

## 2018-03-19 NOTE — PROGRESS NOTES
Subjective:   Joshua Medrano is a 52 y.o. male who presents today for follow up on his heart failure with his family-Daughter, Hollie and wife, Lisa.    Patient was initially seen in the Heart failure on 2/26/18. Pt has ischemic cardiomyopathy, HFrEF, EF 25%, with a recent CABG x 2 (WEIR to LAD, RSVG Major Obtuse Marginal Branch) on 12/22/17. During that visit, patient was volume overloaded and his furosemide was increased to 40 mg twice a day. Patient was also started on Toprol- mg daily. Patient and family report that the patient became latham and aggressive after starting the Toprol. He took the medication for 3 days. After stopping the medication patient's mood returned back to his baseline.    He reports his weight has been stable at 292-294 pounds since the dose increase of the furosemide. Pt has not noticed any weight loss. Patient continues to report being winded with ADLs on occasion and with exertion and continues to have lower extremity edema. Patient occasionally reports PND. Otherwise, patient denies fatigue, chest pain, palpitations, orthopnea or dizziness.    Additonally, patient has the following medical problems:    -CAD, CABG ×2 (LIMA to LAD, RSVG Major Obtuse Marginal Branch) on 12/22/17, with respiratory complications    -Atrial fibrillation: Post operative, Taking amiodarone and warfarin, followed by anticoagulation clinic     -Uses 2 L oxygen at night    -Diabetes: Diet controlled    Chief Complaint   Patient presents with   • Congestive Heart Failure     HF est.         Past Medical History:   Diagnosis Date   • ASTHMA    • Atrial fibrillation (CMS-[facility])    • CAD (coronary artery disease)    • Chronic obstructive pulmonary disease (CMS-HCC)    • Diabetes      Past Surgical History:   Procedure Laterality Date   • THORACOSCOPY Left 1/25/2018    Procedure: THORACOSCOPY- PLEURODESIS ;  Surgeon: Chandu Paez M.D.;  Location: SURGERY [facility];  Service: General   • MULTIPLE  CORONARY ARTERY BYPASS ENDO VEIN HARVEST  12/22/2017    Procedure: MULTIPLE CORONARY ARTERY BYPASS ENDO VEIN HARVEST X2;  Surgeon: Kiel Ash M.D.;  Location: SURGERY [facility];  Service: Cardiothoracic   • JIM  12/22/2017    Procedure: JIM;  Surgeon: Kiel Ash M.D.;  Location: SURGERY [facility];  Service: Cardiothoracic   • OTHER ABDOMINAL SURGERY       Family History   Problem Relation Age of Onset   • Diabetes Mother    • Stroke Mother    • Leukemia Mother    • Diabetes Father    • No Known Problems Sister    • Heart Disease Brother      PPM   • Lung Disease Brother    • Alcohol/Drug Brother    • Diabetes Sister      History   Smoking Status   • Former Smoker   • Packs/day: 0.50   • Years: 30.00   • Types: Cigarettes   • Quit date: 12/13/2017   Smokeless Tobacco   • Never Used     Comment: 1/2-1.5 packs for 30 years     Allergies   Allergen Reactions   • Erythromycin Anaphylaxis   • Cillins [Penicillins] Rash     As a child, tolerated cefepime (12/2017), ceftriaxone (1/2018), cefazolin (12/2017)   • Metoprolol      Pt stated got latham and aggressive    • Shellfish Allergy Anaphylaxis     Pt stated that he is allergic to all seafood, will develop a rash and says that rash will clear up with benadryl     Outpatient Encounter Prescriptions as of 3/19/2018   Medication Sig Dispense Refill   • lisinopril (PRINIVIL) 10 MG Tab Take 1 Tab by mouth every day. 90 Tab 3   • amiodarone (CORDARONE) 200 MG Tab Take 1 Tab by mouth every day. 90 Tab 3   • digoxin (LANOXIN) 125 MCG Tab Take 1 Tab by mouth every day at 6 PM. 90 Tab 3   • rosuvastatin (CRESTOR) 40 MG tablet Take 1 Tab by mouth every evening. 90 Tab 3   • spironolactone (ALDACTONE) 50 MG Tab Take 1 Tab by mouth every day. 90 Tab 3   • aspirin (ASA) 81 MG Chew Tab chewable tablet Take 1 Tab by mouth every day. 90 Tab 3   • warfarin (COUMADIN) 5 MG Tab Take two tablets daily as directed by [facility] 180 Tab 1   • Non  "Formulary Request Inhale 2 L/min by mouth as needed (shortness of breath). Oxygen via nasal cannula intermittent     • ipratropium-albuterol (DUONEB) 0.5-2.5 (3) MG/3ML nebulizer solution 3 mL by Nebulization route every four hours as needed for Shortness of Breath. 30 Bullet 0   • albuterol 108 (90 Base) MCG/ACT Aero Soln inhalation aerosol Inhale 2 Puffs by mouth every 6 hours as needed for Shortness of Breath. 1 Inhaler 10   • metoprolol SR (TOPROL XL) 100 MG TABLET SR 24 HR Take 1 Tab by mouth every day. 90 Tab 3   • [DISCONTINUED] furosemide (LASIX) 40 MG Tab Take 1 Tab by mouth 2 Times a Day. 60 Tab 3     No facility-administered encounter medications on file as of 3/19/2018.      Review of Systems   Constitutional: Negative for fever and malaise/fatigue.   Respiratory: Positive for shortness of breath. Negative for cough.    Cardiovascular: Positive for leg swelling and PND (occ). Negative for chest pain, palpitations, orthopnea and claudication.   Gastrointestinal: Negative for abdominal pain.   Musculoskeletal: Negative for myalgias.   Neurological: Negative for dizziness.   All other systems reviewed and are negative.       Objective:   /56   Pulse 74   Ht 1.956 m (6' 5\")   Wt (!) 135.6 kg (299 lb)   SpO2 90%   BMI 35.46 kg/m²     Physical Exam   Constitutional: He is oriented to person, place, and time. He appears well-developed and well-nourished.   HENT:   Head: Normocephalic and atraumatic.   Eyes: EOM are normal. Pupils are equal, round, and reactive to light.   Neck: Normal range of motion. Neck supple. JVD present.   Cardiovascular: Normal rate, regular rhythm and normal heart sounds.    Pulmonary/Chest: Effort normal and breath sounds normal. No respiratory distress. He has no wheezes. He has no rales.   Diminished breath sounds in lower lobes.   Musculoskeletal: He exhibits edema (1+ bilateral LE Edema).   Neurological: He is alert and oriented to person, place, and time.   Skin: Skin " is warm and dry.   Psychiatric: He has a normal mood and affect. His behavior is normal.     Lab Results   Component Value Date/Time    TRIGLYCERIDE 94 01/08/2018 04:23 AM       Lab Results   Component Value Date/Time    SODIUM 133 (L) 03/14/2018 01:16 PM    POTASSIUM 3.8 03/14/2018 01:16 PM    CHLORIDE 99 03/14/2018 01:16 PM    CO2 23 03/14/2018 01:16 PM    GLUCOSE 85 03/14/2018 01:16 PM    BUN 39 (H) 03/14/2018 01:16 PM    CREATININE 1.33 03/14/2018 01:16 PM     Lab Results   Component Value Date/Time    ALKPHOSPHAT 87 03/14/2018 01:16 PM    ASTSGOT 16 03/14/2018 01:16 PM    ALTSGPT 11 03/14/2018 01:16 PM    TBILIRUBIN 0.4 03/14/2018 01:16 PM      Transthoracic Echo Report 11/19/17  TDS - Body [facility]  No prior study is available for comparison.   Normal left ventricular chamber size.  Mild concentric left ventricular hypertrophy.  Left ventricular ejection fraction is visually estimated to be 35%.  Global hypokinesis with regional variation.  Severely dilated right ventricle.  Reduced right ventricular systolic function.  Mildly dilated left atrium.  Enlarged right atrium.  Possible low grade, low flow stenosis.  Mild mitral regurgitation.  Mild tricuspid regurgitation.  Estimated right ventricular systolic pressure  is 35 mmHg.  Mild pulmonic insufficiency.  Normal pericardium without effusion.  Ascending aorta diameter is 3.6 cm.    Heart Cath 11/23/17  IMPRESSION:  1.  Coronary artery disease with complete occlusion of the proximal right   coronary artery with left to right-sided collaterals.  2.  An 80% or greater lesion in the proximal circumflex.  3.  Eccentric 40% lesion in the proximal LAD followed by a spontaneous LAD   dissection.  FFR with probe in the distal LAD reveals FFR of 0.5 prior to   adenosine administration.  4.  Elevated left ventricular end diastolic pressure of 32.  5.  Hypokinesis of the anterior and inferior walls of the left ventricle.    Ejection fraction calculated at 38%.          Transesophageal Echo Report 12/22/17  Intraoperative JIM during CABG.  Severely reduced LV systolic function   with EF = 15%.  No significant valvular disease.  Findings preserved   post CPB.      Transthoracic Echo Report 1/2/18  Left ventricle is severely dilated.  Left ventricular ejection fraction is visually estimated to be 25-30%.  Global hypokinesis with regional variation.  Aortic sclerosis without stenosis.  Mild aortic insufficiency.  Mild mitral regurgitation.  Rhythm appears to be atrial fibrillation or atrial flutter.      CABG x 2: 12/22/2017     PREOPERATIVE DIAGNOSES:  Coronary artery disease and congestive heart failure.     POSTOPERATIVE DIAGNOSES:  Coronary artery disease and congestive heart   failure.     PROCEDURES PERFORMED:  Coronary artery bypass grafting x2 with left internal   mammary to left anterior descending and reverse saphenous vein graft to the   major obtuse marginal branch, temporary right ventricular and right atrial   pacing leads, extracorporeal circulation, endoscopic saphenous vein harvesting   and placement of intraaortic balloon pump.    Assessment:     1. ACC/AHA stage C systolic heart failure (CMS-HCC)  torsemide (DEMADEX) 20 MG Tab    BASIC METABOLIC PANEL    carvedilol (COREG) 3.125 MG Tab   2. Heart failure, NYHA class 2 (CMS-HCC)  torsemide (DEMADEX) 20 MG Tab    BASIC METABOLIC PANEL    carvedilol (COREG) 3.125 MG Tab   3. Ischemic cardiomyopathy  torsemide (DEMADEX) 20 MG Tab    BASIC METABOLIC PANEL    carvedilol (COREG) 3.125 MG Tab   4. Coronary artery disease involving native coronary artery of native heart without angina pectoris  torsemide (DEMADEX) 20 MG Tab    BASIC METABOLIC PANEL    carvedilol (COREG) 3.125 MG Tab       Medical Decision Making:  Today's Assessment / Status / Plan:   1. HFrEF, stage C,class 2-3: pt continues to be volume overloaded.  -Stop Furosemide   -Start Torsemide 40 mg Twice a day  -Start Carvedilol 3.125 mg Twice a day (pt  unable to tolerate Toprol XL)  -BMP in 1 week   -Continue digoxin 125 µg daily  -Continue lisinopril 10 mg daily  -Continue spironolactone 50 mg daily  -Reinforced s/sx of worsening heart failure with patient and weight monitoring. Pt verbalizes understanding. Pt to call office or RTC if present.   -Patient to call clinic with any questions, problems or concerns with med changes.    2. CAD, s/p CABG x 2/ DLD:   -Continue aspirin 81 mg daily  -Continue rosuvastatin 40 mg daily    3. Afib:  -Continue amiodarone 200 mg daily  -Continue warfarin, followed by anticoagulation clinic    4. HTN: stable  -recommendations per above    FU in clinic in 1 week with labs. Sooner if needed.    Patient verbalizes understanding and agrees with the plan of care.     Collaborating MD: John Escalante MD

## 2018-03-20 VITALS
BODY MASS INDEX: 34.86 KG/M2 | WEIGHT: 294 LBS | DIASTOLIC BLOOD PRESSURE: 70 MMHG | SYSTOLIC BLOOD PRESSURE: 118 MMHG | TEMPERATURE: 96.7 F | HEART RATE: 71 BPM | RESPIRATION RATE: 18 BRPM | OXYGEN SATURATION: 91 %

## 2018-03-20 PROCEDURE — 665998 HH PPS REVENUE CREDIT

## 2018-03-20 PROCEDURE — 665999 HH PPS REVENUE DEBIT

## 2018-03-20 SDOH — ECONOMIC STABILITY: HOUSING INSECURITY: UNSAFE COOKING RANGE AREA: 0

## 2018-03-20 SDOH — ECONOMIC STABILITY: HOUSING INSECURITY: HOME SAFETY: PATIENT LIVES IN A SMALL WEEKLY MOTEL WITH WIFE, 1 SMALL DOG AND A CAT

## 2018-03-20 SDOH — ECONOMIC STABILITY: HOUSING INSECURITY: UNSAFE APPLIANCES: 0

## 2018-03-21 ENCOUNTER — HOME CARE VISIT (OUTPATIENT)
Dept: HOME HEALTH SERVICES | Facility: HOME HEALTHCARE | Age: 53
End: 2018-03-21
Payer: MEDICARE

## 2018-03-21 PROCEDURE — 665999 HH PPS REVENUE DEBIT

## 2018-03-21 PROCEDURE — 665998 HH PPS REVENUE CREDIT

## 2018-03-21 ASSESSMENT — ACTIVITIES OF DAILY LIVING (ADL)
OASIS_M1830: 00
HOME_HEALTH_OASIS: 00

## 2018-03-22 PROCEDURE — 665998 HH PPS REVENUE CREDIT

## 2018-03-22 PROCEDURE — 665999 HH PPS REVENUE DEBIT

## 2018-03-23 ENCOUNTER — RESOLUTE PROFESSIONAL BILLING HOSPITAL PROF FEE (OUTPATIENT)
Dept: MEDSURG UNIT | Facility: MEDICAL CENTER | Age: 53
End: 2018-03-23
Payer: MEDICARE

## 2018-03-23 ENCOUNTER — HOSPITAL ENCOUNTER (OUTPATIENT)
Facility: MEDICAL CENTER | Age: 53
End: 2018-03-24
Attending: EMERGENCY MEDICINE | Admitting: INTERNAL MEDICINE
Payer: MEDICARE

## 2018-03-23 ENCOUNTER — APPOINTMENT (OUTPATIENT)
Dept: RADIOLOGY | Facility: MEDICAL CENTER | Age: 53
End: 2018-03-23
Attending: EMERGENCY MEDICINE
Payer: MEDICARE

## 2018-03-23 PROBLEM — J22 LOWER RESPIRATORY INFECTION (E.G., BRONCHITIS, PNEUMONIA, PNEUMONITIS, PULMONITIS): Status: ACTIVE | Noted: 2018-03-23

## 2018-03-23 LAB
ALBUMIN SERPL BCP-MCNC: 4 G/DL (ref 3.2–4.9)
ALBUMIN/GLOB SERPL: 0.9 G/DL
ALP SERPL-CCNC: 90 U/L (ref 30–99)
ALT SERPL-CCNC: 12 U/L (ref 2–50)
ANION GAP SERPL CALC-SCNC: 8 MMOL/L (ref 0–11.9)
ANISOCYTOSIS BLD QL SMEAR: ABNORMAL
APTT PPP: 41.7 SEC (ref 24.7–36)
AST SERPL-CCNC: 19 U/L (ref 12–45)
BASOPHILS # BLD AUTO: 1.3 % (ref 0–1.8)
BASOPHILS # BLD: 0.17 K/UL (ref 0–0.12)
BILIRUB SERPL-MCNC: 0.4 MG/DL (ref 0.1–1.5)
BNP SERPL-MCNC: 234 PG/ML (ref 0–100)
BUN SERPL-MCNC: 41 MG/DL (ref 8–22)
CALCIUM SERPL-MCNC: 9.5 MG/DL (ref 8.5–10.5)
CHLORIDE SERPL-SCNC: 100 MMOL/L (ref 96–112)
CO2 SERPL-SCNC: 24 MMOL/L (ref 20–33)
COMMENT 1642: NORMAL
CREAT SERPL-MCNC: 1.37 MG/DL (ref 0.5–1.4)
DACRYOCYTES BLD QL SMEAR: NORMAL
DIGOXIN SERPL-MCNC: 0.6 NG/ML (ref 0.8–2)
EKG IMPRESSION: NORMAL
EOSINOPHIL # BLD AUTO: 0.8 K/UL (ref 0–0.51)
EOSINOPHIL NFR BLD: 5.9 % (ref 0–6.9)
ERYTHROCYTE [DISTWIDTH] IN BLOOD BY AUTOMATED COUNT: 53.6 FL (ref 35.9–50)
EST. AVERAGE GLUCOSE BLD GHB EST-MCNC: 123 MG/DL
FLUAV RNA SPEC QL NAA+PROBE: NEGATIVE
FLUBV RNA SPEC QL NAA+PROBE: NEGATIVE
GLOBULIN SER CALC-MCNC: 4.4 G/DL (ref 1.9–3.5)
GLUCOSE SERPL-MCNC: 116 MG/DL (ref 65–99)
HBA1C MFR BLD: 5.9 % (ref 0–5.6)
HCT VFR BLD AUTO: 34.1 % (ref 42–52)
HGB BLD-MCNC: 9.1 G/DL (ref 14–18)
HYPOCHROMIA BLD QL SMEAR: ABNORMAL
IMM GRANULOCYTES # BLD AUTO: 0.21 K/UL (ref 0–0.11)
IMM GRANULOCYTES NFR BLD AUTO: 1.6 % (ref 0–0.9)
INR PPP: 2.48 (ref 0.87–1.13)
LYMPHOCYTES # BLD AUTO: 1.16 K/UL (ref 1–4.8)
LYMPHOCYTES NFR BLD: 8.6 % (ref 22–41)
MCH RBC QN AUTO: 18.6 PG (ref 27–33)
MCHC RBC AUTO-ENTMCNC: 26.7 G/DL (ref 33.7–35.3)
MCV RBC AUTO: 69.7 FL (ref 81.4–97.8)
MICROCYTES BLD QL SMEAR: ABNORMAL
MONOCYTES # BLD AUTO: 1.06 K/UL (ref 0–0.85)
MONOCYTES NFR BLD AUTO: 7.9 % (ref 0–13.4)
MORPHOLOGY BLD-IMP: NORMAL
NEUTROPHILS # BLD AUTO: 10.1 K/UL (ref 1.82–7.42)
NEUTROPHILS NFR BLD: 74.7 % (ref 44–72)
NRBC # BLD AUTO: 0 K/UL
NRBC BLD-RTO: 0 /100 WBC
PLATELET # BLD AUTO: 367 K/UL (ref 164–446)
PLATELET BLD QL SMEAR: NORMAL
PMV BLD AUTO: 9.7 FL (ref 9–12.9)
POIKILOCYTOSIS BLD QL SMEAR: NORMAL
POTASSIUM SERPL-SCNC: 4.2 MMOL/L (ref 3.6–5.5)
PROCALCITONIN SERPL-MCNC: 0.12 NG/ML
PROT SERPL-MCNC: 8.4 G/DL (ref 6–8.2)
PROTHROMBIN TIME: 26.5 SEC (ref 12–14.6)
RBC # BLD AUTO: 4.89 M/UL (ref 4.7–6.1)
RBC BLD AUTO: PRESENT
SODIUM SERPL-SCNC: 132 MMOL/L (ref 135–145)
TROPONIN I SERPL-MCNC: 0.04 NG/ML (ref 0–0.04)
WBC # BLD AUTO: 13.5 K/UL (ref 4.8–10.8)

## 2018-03-23 PROCEDURE — 99220 PR INITIAL OBSERVATION CARE,LEVL III: CPT | Mod: GC | Performed by: INTERNAL MEDICINE

## 2018-03-23 PROCEDURE — 83880 ASSAY OF NATRIURETIC PEPTIDE: CPT

## 2018-03-23 PROCEDURE — 80053 COMPREHEN METABOLIC PANEL: CPT

## 2018-03-23 PROCEDURE — 700102 HCHG RX REV CODE 250 W/ 637 OVERRIDE(OP): Performed by: INTERNAL MEDICINE

## 2018-03-23 PROCEDURE — A9270 NON-COVERED ITEM OR SERVICE: HCPCS | Performed by: STUDENT IN AN ORGANIZED HEALTH CARE EDUCATION/TRAINING PROGRAM

## 2018-03-23 PROCEDURE — 83036 HEMOGLOBIN GLYCOSYLATED A1C: CPT

## 2018-03-23 PROCEDURE — 84145 PROCALCITONIN (PCT): CPT

## 2018-03-23 PROCEDURE — 700111 HCHG RX REV CODE 636 W/ 250 OVERRIDE (IP): Performed by: INTERNAL MEDICINE

## 2018-03-23 PROCEDURE — 665999 HH PPS REVENUE DEBIT

## 2018-03-23 PROCEDURE — 700101 HCHG RX REV CODE 250: Performed by: STUDENT IN AN ORGANIZED HEALTH CARE EDUCATION/TRAINING PROGRAM

## 2018-03-23 PROCEDURE — 93005 ELECTROCARDIOGRAM TRACING: CPT

## 2018-03-23 PROCEDURE — 85025 COMPLETE CBC W/AUTO DIFF WBC: CPT

## 2018-03-23 PROCEDURE — 700102 HCHG RX REV CODE 250 W/ 637 OVERRIDE(OP): Performed by: STUDENT IN AN ORGANIZED HEALTH CARE EDUCATION/TRAINING PROGRAM

## 2018-03-23 PROCEDURE — 85610 PROTHROMBIN TIME: CPT

## 2018-03-23 PROCEDURE — 94640 AIRWAY INHALATION TREATMENT: CPT

## 2018-03-23 PROCEDURE — 84484 ASSAY OF TROPONIN QUANT: CPT

## 2018-03-23 PROCEDURE — 71045 X-RAY EXAM CHEST 1 VIEW: CPT

## 2018-03-23 PROCEDURE — 99285 EMERGENCY DEPT VISIT HI MDM: CPT

## 2018-03-23 PROCEDURE — A9270 NON-COVERED ITEM OR SERVICE: HCPCS | Performed by: INTERNAL MEDICINE

## 2018-03-23 PROCEDURE — 96374 THER/PROPH/DIAG INJ IV PUSH: CPT

## 2018-03-23 PROCEDURE — 87502 INFLUENZA DNA AMP PROBE: CPT

## 2018-03-23 PROCEDURE — 36415 COLL VENOUS BLD VENIPUNCTURE: CPT

## 2018-03-23 PROCEDURE — 700111 HCHG RX REV CODE 636 W/ 250 OVERRIDE (IP): Performed by: EMERGENCY MEDICINE

## 2018-03-23 PROCEDURE — G0378 HOSPITAL OBSERVATION PER HR: HCPCS

## 2018-03-23 PROCEDURE — 85730 THROMBOPLASTIN TIME PARTIAL: CPT

## 2018-03-23 PROCEDURE — 87040 BLOOD CULTURE FOR BACTERIA: CPT | Mod: 91

## 2018-03-23 PROCEDURE — 93005 ELECTROCARDIOGRAM TRACING: CPT | Performed by: EMERGENCY MEDICINE

## 2018-03-23 PROCEDURE — 80162 ASSAY OF DIGOXIN TOTAL: CPT

## 2018-03-23 PROCEDURE — 665998 HH PPS REVENUE CREDIT

## 2018-03-23 RX ORDER — DIGOXIN 125 MCG
125 TABLET ORAL DAILY
Status: DISCONTINUED | OUTPATIENT
Start: 2018-03-23 | End: 2018-03-24 | Stop reason: HOSPADM

## 2018-03-23 RX ORDER — LISINOPRIL 10 MG/1
10 TABLET ORAL EVERY EVENING
Status: ON HOLD | COMMUNITY
End: 2018-03-24

## 2018-03-23 RX ORDER — FUROSEMIDE 20 MG/1
20 TABLET ORAL 2 TIMES DAILY
COMMUNITY
End: 2018-04-06 | Stop reason: SDUPTHER

## 2018-03-23 RX ORDER — IPRATROPIUM BROMIDE AND ALBUTEROL SULFATE 2.5; .5 MG/3ML; MG/3ML
3 SOLUTION RESPIRATORY (INHALATION)
Status: DISCONTINUED | OUTPATIENT
Start: 2018-03-23 | End: 2018-03-23

## 2018-03-23 RX ORDER — CARVEDILOL 3.12 MG/1
3.12 TABLET ORAL 2 TIMES DAILY WITH MEALS
Status: DISCONTINUED | OUTPATIENT
Start: 2018-03-23 | End: 2018-03-24 | Stop reason: HOSPADM

## 2018-03-23 RX ORDER — ACETAMINOPHEN 325 MG/1
650 TABLET ORAL EVERY 6 HOURS PRN
Status: DISCONTINUED | OUTPATIENT
Start: 2018-03-23 | End: 2018-03-24 | Stop reason: HOSPADM

## 2018-03-23 RX ORDER — BUDESONIDE AND FORMOTEROL FUMARATE DIHYDRATE 160; 4.5 UG/1; UG/1
2 AEROSOL RESPIRATORY (INHALATION)
Status: DISCONTINUED | OUTPATIENT
Start: 2018-03-23 | End: 2018-03-24 | Stop reason: HOSPADM

## 2018-03-23 RX ORDER — WARFARIN SODIUM 10 MG/1
10 TABLET ORAL
Status: COMPLETED | OUTPATIENT
Start: 2018-03-23 | End: 2018-03-23

## 2018-03-23 RX ORDER — AMIODARONE HYDROCHLORIDE 200 MG/1
200 TABLET ORAL DAILY
Status: DISCONTINUED | OUTPATIENT
Start: 2018-03-23 | End: 2018-03-24 | Stop reason: HOSPADM

## 2018-03-23 RX ORDER — PREDNISONE 20 MG/1
40 TABLET ORAL DAILY
Status: DISCONTINUED | OUTPATIENT
Start: 2018-03-23 | End: 2018-03-24 | Stop reason: HOSPADM

## 2018-03-23 RX ORDER — IPRATROPIUM BROMIDE AND ALBUTEROL SULFATE 2.5; .5 MG/3ML; MG/3ML
3 SOLUTION RESPIRATORY (INHALATION)
Status: DISCONTINUED | OUTPATIENT
Start: 2018-03-23 | End: 2018-03-24 | Stop reason: HOSPADM

## 2018-03-23 RX ORDER — FUROSEMIDE 20 MG/1
20 TABLET ORAL 2 TIMES DAILY
Status: DISCONTINUED | OUTPATIENT
Start: 2018-03-23 | End: 2018-03-24

## 2018-03-23 RX ORDER — LISINOPRIL 2.5 MG/1
2.5 TABLET ORAL
Status: DISCONTINUED | OUTPATIENT
Start: 2018-03-23 | End: 2018-03-24

## 2018-03-23 RX ORDER — WARFARIN SODIUM 5 MG/1
5-10 TABLET ORAL DAILY
Status: ON HOLD | COMMUNITY
End: 2018-07-27

## 2018-03-23 RX ORDER — BISACODYL 10 MG
10 SUPPOSITORY, RECTAL RECTAL
Status: DISCONTINUED | OUTPATIENT
Start: 2018-03-23 | End: 2018-03-24 | Stop reason: HOSPADM

## 2018-03-23 RX ORDER — ASPIRIN 81 MG/1
81 TABLET, CHEWABLE ORAL DAILY
Status: DISCONTINUED | OUTPATIENT
Start: 2018-03-24 | End: 2018-03-24 | Stop reason: HOSPADM

## 2018-03-23 RX ORDER — POLYETHYLENE GLYCOL 3350 17 G/17G
1 POWDER, FOR SOLUTION ORAL
Status: DISCONTINUED | OUTPATIENT
Start: 2018-03-23 | End: 2018-03-24 | Stop reason: HOSPADM

## 2018-03-23 RX ORDER — AMOXICILLIN 250 MG
2 CAPSULE ORAL 2 TIMES DAILY
Status: DISCONTINUED | OUTPATIENT
Start: 2018-03-23 | End: 2018-03-24 | Stop reason: HOSPADM

## 2018-03-23 RX ORDER — GUAIFENESIN/DEXTROMETHORPHAN 100-10MG/5
10 SYRUP ORAL EVERY 6 HOURS PRN
Status: DISCONTINUED | OUTPATIENT
Start: 2018-03-23 | End: 2018-03-24 | Stop reason: HOSPADM

## 2018-03-23 RX ORDER — SPIRONOLACTONE 25 MG/1
50 TABLET ORAL DAILY
Status: DISCONTINUED | OUTPATIENT
Start: 2018-03-23 | End: 2018-03-23

## 2018-03-23 RX ORDER — DOXYCYCLINE 100 MG/1
100 TABLET ORAL EVERY 12 HOURS
Status: DISCONTINUED | OUTPATIENT
Start: 2018-03-23 | End: 2018-03-24 | Stop reason: HOSPADM

## 2018-03-23 RX ORDER — ROSUVASTATIN CALCIUM 20 MG/1
40 TABLET, COATED ORAL EVERY EVENING
Status: DISCONTINUED | OUTPATIENT
Start: 2018-03-23 | End: 2018-03-24 | Stop reason: HOSPADM

## 2018-03-23 RX ORDER — CEFTRIAXONE 2 G/1
2 INJECTION, POWDER, FOR SOLUTION INTRAMUSCULAR; INTRAVENOUS ONCE
Status: COMPLETED | OUTPATIENT
Start: 2018-03-23 | End: 2018-03-23

## 2018-03-23 RX ADMIN — WARFARIN SODIUM 10 MG: 10 TABLET ORAL at 19:03

## 2018-03-23 RX ADMIN — IPRATROPIUM BROMIDE AND ALBUTEROL SULFATE 3 ML: .5; 3 SOLUTION RESPIRATORY (INHALATION) at 14:04

## 2018-03-23 RX ADMIN — DIGOXIN 125 MCG: 125 TABLET ORAL at 17:24

## 2018-03-23 RX ADMIN — AMIODARONE HYDROCHLORIDE 200 MG: 200 TABLET ORAL at 17:41

## 2018-03-23 RX ADMIN — LISINOPRIL 2.5 MG: 2.5 TABLET ORAL at 17:24

## 2018-03-23 RX ADMIN — CARVEDILOL 3.12 MG: 3.12 TABLET, FILM COATED ORAL at 19:03

## 2018-03-23 RX ADMIN — CEFTRIAXONE SODIUM 2 G: 2 INJECTION, POWDER, FOR SOLUTION INTRAMUSCULAR; INTRAVENOUS at 12:38

## 2018-03-23 RX ADMIN — BUDESONIDE AND FORMOTEROL FUMARATE DIHYDRATE 2 PUFF: 160; 4.5 AEROSOL RESPIRATORY (INHALATION) at 17:14

## 2018-03-23 RX ADMIN — IPRATROPIUM BROMIDE AND ALBUTEROL SULFATE 3 ML: .5; 3 SOLUTION RESPIRATORY (INHALATION) at 18:43

## 2018-03-23 RX ADMIN — DOXYCYCLINE 100 MG: 100 TABLET ORAL at 20:43

## 2018-03-23 RX ADMIN — FUROSEMIDE 20 MG: 20 TABLET ORAL at 20:41

## 2018-03-23 RX ADMIN — IPRATROPIUM BROMIDE AND ALBUTEROL SULFATE 3 ML: .5; 3 SOLUTION RESPIRATORY (INHALATION) at 22:17

## 2018-03-23 RX ADMIN — PREDNISONE 40 MG: 20 TABLET ORAL at 17:12

## 2018-03-23 RX ADMIN — ROSUVASTATIN CALCIUM 40 MG: 20 TABLET, FILM COATED ORAL at 20:41

## 2018-03-23 ASSESSMENT — PAIN SCALES - WONG BAKER
WONGBAKER_NUMERICALRESPONSE: DOESN'T HURT AT ALL

## 2018-03-23 ASSESSMENT — ENCOUNTER SYMPTOMS
HEARTBURN: 0
SORE THROAT: 0
MYALGIAS: 0
CHILLS: 0
FOCAL WEAKNESS: 0
SHORTNESS OF BREATH: 0
ORTHOPNEA: 1
SPEECH CHANGE: 0
POLYDIPSIA: 1
COUGH: 1
CONSTIPATION: 0
TINGLING: 0
DIARRHEA: 0
DOUBLE VISION: 0
BLURRED VISION: 0
FEVER: 1
DIZZINESS: 0
HEMOPTYSIS: 0
SPUTUM PRODUCTION: 1
VOMITING: 1
NAUSEA: 0
HEADACHES: 1
BLOOD IN STOOL: 0
ABDOMINAL PAIN: 0
LOSS OF CONSCIOUSNESS: 0
PALPITATIONS: 0
SENSORY CHANGE: 0

## 2018-03-23 ASSESSMENT — PAIN SCALES - GENERAL
PAINLEVEL_OUTOF10: 0

## 2018-03-23 ASSESSMENT — COPD QUESTIONNAIRES
DURING THE PAST 4 WEEKS HOW MUCH DID YOU FEEL SHORT OF BREATH: SOME OF THE TIME
HAVE YOU SMOKED AT LEAST 100 CIGARETTES IN YOUR ENTIRE LIFE: YES
COPD SCREENING SCORE: 5
DO YOU EVER COUGH UP ANY MUCUS OR PHLEGM?: NO/ONLY WITH OCCASIONAL COLDS OR INFECTIONS

## 2018-03-23 ASSESSMENT — CHA2DS2 SCORE
VASCULAR DISEASE: YES
CHF OR LEFT VENTRICULAR DYSFUNCTION: YES
DIABETES: YES
CHA2DS2 VASC SCORE: 3
AGE 65 TO 74: NO
HYPERTENSION: NO
AGE 75 OR GREATER: NO
PRIOR STROKE OR TIA OR THROMBOEMBOLISM: NO
SEX: MALE

## 2018-03-23 ASSESSMENT — PATIENT HEALTH QUESTIONNAIRE - PHQ9
1. LITTLE INTEREST OR PLEASURE IN DOING THINGS: NOT AT ALL
SUM OF ALL RESPONSES TO PHQ9 QUESTIONS 1 AND 2: 0
1. LITTLE INTEREST OR PLEASURE IN DOING THINGS: NOT AT ALL
2. FEELING DOWN, DEPRESSED, IRRITABLE, OR HOPELESS: NOT AT ALL
2. FEELING DOWN, DEPRESSED, IRRITABLE, OR HOPELESS: NOT AT ALL
SUM OF ALL RESPONSES TO PHQ9 QUESTIONS 1 AND 2: 0

## 2018-03-23 ASSESSMENT — LIFESTYLE VARIABLES
ALCOHOL_USE: NO
SUBSTANCE_ABUSE: 0
DO YOU DRINK ALCOHOL: NO
EVER_SMOKED: NEVER

## 2018-03-23 NOTE — ED TRIAGE NOTES
Pt to triage , c/o worsening SOB , hx of triple bypass this past December,  Currently denies SOB, on home O2 as needed, worsening  SOB when he lies flat , EKG done

## 2018-03-23 NOTE — H&P
Internal Medicine Admitting History and Physical    Note Author: Nam Le M.D.       Name Joshua Medrano    [facility] 1965   Age/Sex 52 y.o. male   MRN [id number]   Code Status Full     After 5PM or if no immediate response to page, please call for cross-coverage  Attending/Team: Dr. Michael / Jose team See Patient List for primary contact information  Call [phone] to page    1st Call - Day Intern (R1):   Rey GALLARDO 2nd Call - Day Sr. Resident (R2/R3):   Dr. Luis       Chief Complaint:  Cough, SOB    HPI:  53 y/o male with complex medical history of COPD, asthma, Diabetes (on no meds), CAD with 2 vessel CABG, A-fib, DLD, anemia presents with complaint of cough and increased SOB. Cough started 5 days ago and gradually worsened to become productive of greenish sputum yesterday. Associated runny nose and sensation of fever this morning. No nausea, but one episode of vomiting due to coughing forcibly. Uses oxygen at night at baseline and sometimes during the day as well, requiring it more often recently. He denies chest pain, palpitations.     Has not gotten a flu shot. Has no sick contacts or history of travel. Has two cats and a dog.     Review of Systems   Constitutional: Positive for fever and malaise/fatigue. Negative for chills.   HENT: Negative for congestion (runny nose) and sore throat.    Eyes: Negative for blurred vision and double vision.   Respiratory: Positive for cough and sputum production. Negative for hemoptysis and shortness of breath.    Cardiovascular: Positive for orthopnea. Negative for chest pain and palpitations.   Gastrointestinal: Positive for vomiting (once a couple days ago). Negative for abdominal pain, blood in stool, constipation, diarrhea, heartburn, melena and nausea.   Genitourinary: Negative for dysuria and urgency.   Musculoskeletal: Negative for joint pain and myalgias.   Skin: Negative for rash.   Neurological: Positive for headaches. Negative for dizziness, tingling,  sensory change, speech change, focal weakness and loss of consciousness.   Endo/Heme/Allergies: Positive for polydipsia.   Psychiatric/Behavioral: Negative for substance abuse. Insomnia:                 Past Medical History:   Past Medical History:   Diagnosis Date   • ASTHMA    • Atrial fibrillation (CMS-HCC)    • CAD (coronary artery disease)    • Chronic obstructive pulmonary disease (CMS-HCC)    • Diabetes        Past Surgical History:  Past Surgical History:   Procedure Laterality Date   • THORACOSCOPY Left 1/25/2018    Procedure: THORACOSCOPY- PLEURODESIS ;  Surgeon: Chandu Paez M.D.;  Location: SURGERY [facility];  Service: General   • MULTIPLE CORONARY ARTERY BYPASS ENDO VEIN HARVEST  12/22/2017    Procedure: MULTIPLE CORONARY ARTERY BYPASS ENDO VEIN HARVEST X2;  Surgeon: Kiel Ash M.D.;  Location: SURGERY [facility];  Service: Cardiothoracic   • JIM  12/22/2017    Procedure: JIM;  Surgeon: Kiel Ash M.D.;  Location: SURGERY [facility];  Service: Cardiothoracic   • OTHER ABDOMINAL SURGERY         Current Outpatient Medications:  Home Medications     Reviewed by Vinny Nicole (Pharmacy Tech) on 03/23/18 at 1248  Med List Status: Complete   Medication Last Dose Status   albuterol 108 (90 Base) MCG/ACT Aero Soln inhalation aerosol 2 weeks Active   amiodarone (CORDARONE) 200 MG Tab 3/22/2018 Active   aspirin (ASA) 81 MG Chew Tab chewable tablet 3/23/2018 Active   carvedilol (COREG) 3.125 MG Tab 3/22/2018 Active   digoxin (LANOXIN) 125 MCG Tab 3/22/2018 Active   furosemide (LASIX) 20 MG Tab 3/23/2018 Active   ipratropium-albuterol (DUONEB) 0.5-2.5 (3) MG/3ML nebulizer solution 3/23/2018 Active   lisinopril (PRINIVIL) 10 MG Tab 3/22/2018 Active   rosuvastatin (CRESTOR) 40 MG tablet 3/22/2018 Active   spironolactone (ALDACTONE) 50 MG Tab 3/22/2018 Active   warfarin (COUMADIN) 5 MG Tab 3/23/2018 Active                Medication Allergy/Sensitivities:  Allergies   Allergen  "Reactions   • Erythromycin Anaphylaxis     Rxn - 1994   • Cillins [Penicillins] Rash     As a child, tolerated cefepime (12/2017), ceftriaxone (1/2018), cefazolin (12/2017)   • Metoprolol      Pt stated got l[place] and aggressive    • Shellfish Allergy Anaphylaxis     Pt stated that he is allergic to all seafood, will develop a rash and says that rash will clear up with benadryl         Family History:  Family History   Problem Relation Age of Onset   • Diabetes Mother    • Stroke Mother    • Leukemia Mother    • Diabetes Father    • No Known Problems Sister    • Heart Disease Brother      PPM   • Lung Disease Brother    • Alcohol/Drug Brother    • Diabetes Sister        Social History:  Social History     Social History   • Marital status:      Spouse name: N/A   • Number of children: N/A   • Years of education: N/A     Occupational History   • Not on file.     Social History Main Topics   • Smoking status: Former Smoker     Packs/day: 0.50     Years: 30.00     Types: Cigarettes     Quit date: 12/13/2017   • Smokeless tobacco: Never Used      Comment: 1/2-1.5 packs for 30 years   • Alcohol use No   • Drug use: No   • Sexual activity: Not on file     Other Topics Concern   • Not on file     Social History Narrative   • No narrative on file     Living situation: with his wife  PCP : Nam Le M.D.    Physical Exam     Vitals:    03/23/18 1406 03/23/18 1430 03/23/18 1441 03/23/18 1512   BP:    110/62   Pulse: 69  69 68   Resp: (!) 22  (!) 26 12   Temp:    36.6 °C (97.8 °F)   SpO2: 95% 94% 96% 94%   Weight:    (!) 132 kg (291 lb 0.1 oz)   Height:    1.956 m (6' 5\")     Body mass index is 34.51 kg/m².  /62   Pulse 68   Temp 36.6 °C (97.8 °F)   Resp 12   Ht 1.956 m (6' 5\")   Wt (!) 132 kg (291 lb 0.1 oz)   SpO2 94%   BMI 34.51 kg/m²   O2 therapy: Pulse Oximetry: 94 %, O2 (LPM): 2, O2 Delivery: Nasal Cannula    Physical Exam   Constitutional: He is oriented to person, place, and time and " well-developed, well-nourished, and in no distress. No distress.   HENT:   Head: Normocephalic and atraumatic.   Mouth/Throat: No oropharyngeal exudate.   Eyes: Conjunctivae are normal. Pupils are equal, round, and reactive to light. Right eye exhibits no discharge. Left eye exhibits no discharge.   Neck: Normal range of motion. Neck supple. No JVD present.   Cardiovascular: Normal rate and regular rhythm.  Exam reveals no friction rub.    No murmur heard.  Pulmonary/Chest: Breath sounds normal. He is in respiratory distress. He has no wheezes. He has no rales.   Abdominal: Soft. Bowel sounds are normal. He exhibits no distension. There is no tenderness. There is no guarding.   Genitourinary:   Genitourinary Comments: Refused CONSTANZA   Musculoskeletal: He exhibits no edema or deformity.   Neurological: He is alert and oriented to person, place, and time.   Skin: Skin is warm and dry. He is not diaphoretic. No erythema.   Pigmentation of BLE due to venous stasis   Psychiatric: Mood and affect normal.             Data Review       Old Records Request:   Deferred  Current Records review and summary: Completed    Lab Data Review:  Recent Results (from the past 24 hour(s))   EKG (ER)    Collection Time: 18 10:36 AM   Result Value Ref Range    Report       [facility]    Test Date:  2018  Pt Name:    JARRETT WHITE                   Department: ER  MRN:        [id number]                      Room:  Gender:     Male                         Technician: [id number]  :        1965                   Requested By:ER TRIAGE PROTOCOL  Order #:    [id number]                    Reading MD: YENNI MCNAIR MD    Measurements  Intervals                                Axis  Rate:       72                           P:          38  MO:         228                          QRS:        -36  QRSD:       98                           T:          165  QT:         380  QTc:        416    Interpretive  Statements  SINUS RHYTHM  FIRST DEGREE AV BLOCK  LEFT AXIS DEVIATION  BORDERLINE R WAVE PROGRESSION, ANTERIOR LEADS  NONSPECIFIC T ABNORMALITIES, LATERAL LEADS  Compared to ECG 02/06/2018 13:38:44  First degree AV block now present  T-wave abnormality now present  Prolonged QT interval no longer present    Electronically Signed  On 3- 11:09:56 PDT by YENNI MCNAIR MD     BTYPE NATRIURETIC PEPTIDE    Collection Time: 03/23/18 11:05 AM   Result Value Ref Range    B Natriuretic Peptide 234 (H) 0 - 100 pg/mL   CBC WITH DIFFERENTIAL    Collection Time: 03/23/18 11:05 AM   Result Value Ref Range    WBC 13.5 (H) 4.8 - 10.8 K/uL    RBC 4.89 4.70 - 6.10 M/uL    Hemoglobin 9.1 (L) 14.0 - 18.0 g/dL    Hematocrit 34.1 (L) 42.0 - 52.0 %    MCV 69.7 (L) 81.4 - 97.8 fL    MCH 18.6 (L) 27.0 - 33.0 pg    MCHC 26.7 (L) 33.7 - 35.3 g/dL    RDW 53.6 (H) 35.9 - 50.0 fL    Platelet Count 367 164 - 446 K/uL    MPV 9.7 9.0 - 12.9 fL    Neutrophils-Polys 74.70 (H) 44.00 - 72.00 %    Lymphocytes 8.60 (L) 22.00 - 41.00 %    Monocytes 7.90 0.00 - 13.40 %    Eosinophils 5.90 0.00 - 6.90 %    Basophils 1.30 0.00 - 1.80 %    Immature Granulocytes 1.60 (H) 0.00 - 0.90 %    Nucleated RBC 0.00 /100 WBC    Neutrophils (Absolute) 10.10 (H) 1.82 - 7.42 K/uL    Lymphs (Absolute) 1.16 1.00 - 4.80 K/uL    Monos (Absolute) 1.06 (H) 0.00 - 0.85 K/uL    Eos (Absolute) 0.80 (H) 0.00 - 0.51 K/uL    Baso (Absolute) 0.17 (H) 0.00 - 0.12 K/uL    Immature Granulocytes (abs) 0.21 (H) 0.00 - 0.11 K/uL    NRBC (Absolute) 0.00 K/uL    Hypochromia 1+     Anisocytosis 2+     Microcytosis 2+    COMP METABOLIC PANEL    Collection Time: 03/23/18 11:05 AM   Result Value Ref Range    Sodium 132 (L) 135 - 145 mmol/L    Potassium 4.2 3.6 - 5.5 mmol/L    Chloride 100 96 - 112 mmol/L    Co2 24 20 - 33 mmol/L    Anion Gap 8.0 0.0 - 11.9    Glucose 116 (H) 65 - 99 mg/dL    Bun 41 (H) 8 - 22 mg/dL    Creatinine 1.37 0.50 - 1.40 mg/dL    Calcium 9.5 8.5 - 10.5 mg/dL     AST(SGOT) 19 12 - 45 U/L    ALT(SGPT) 12 2 - 50 U/L    Alkaline Phosphatase 90 30 - 99 U/L    Total Bilirubin 0.4 0.1 - 1.5 mg/dL    Albumin 4.0 3.2 - 4.9 g/dL    Total Protein 8.4 (H) 6.0 - 8.2 g/dL    Globulin 4.4 (H) 1.9 - 3.5 g/dL    A-G Ratio 0.9 g/dL   TROPONIN    Collection Time: 03/23/18 11:05 AM   Result Value Ref Range    Troponin I 0.04 0.00 - 0.04 ng/mL   PROTHROMBIN TIME    Collection Time: 03/23/18 11:05 AM   Result Value Ref Range    PT 26.5 (H) 12.0 - 14.6 sec    INR 2.48 (H) 0.87 - 1.13   APTT    Collection Time: 03/23/18 11:05 AM   Result Value Ref Range    APTT 41.7 (H) 24.7 - 36.0 sec   DIGOXIN    Collection Time: 03/23/18 11:05 AM   Result Value Ref Range    Digoxin 0.6 (L) 0.8 - 2.0 ng/mL   ESTIMATED GFR    Collection Time: 03/23/18 11:05 AM   Result Value Ref Range    GFR If African American >60 >60 mL/min/1.73 m 2    GFR If Non African American 54 (A) >60 mL/min/1.73 m 2   PERIPHERAL SMEAR REVIEW    Collection Time: 03/23/18 11:05 AM   Result Value Ref Range    Peripheral Smear Review see below    PLATELET ESTIMATE    Collection Time: 03/23/18 11:05 AM   Result Value Ref Range    Plt Estimation Normal    MORPHOLOGY    Collection Time: 03/23/18 11:05 AM   Result Value Ref Range    RBC Morphology Present     Poikilocytosis 1+     Tear Drop Cells 1+    DIFFERENTIAL COMMENT    Collection Time: 03/23/18 11:05 AM   Result Value Ref Range    Comments-Diff see below    PROCALCITONIN    Collection Time: 03/23/18 11:05 AM   Result Value Ref Range    Procalcitonin 0.12 <0.25 ng/mL   INFLUENZA A/B BY PCR    Collection Time: 03/23/18 11:10 AM   Result Value Ref Range    Influenza virus A RNA Negative Negative    Influenza virus B, PCR Negative Negative       Imaging/Procedures Review:    ndependant Imaging Review: Completed  DX-CHEST-PORTABLE (1 VIEW)   Final Result      1.  Stable cardiomegaly      2.  Mild interstitial edema.      3.  Left basilar opacification likely represents a small pleural  effusion.           EKG:   EKG Independant Review: Completed  QTc:416, HR: 72, Normal Sinus Rhythm, nonspecific ST/T changes     (x) Records reviewed and summarized in current documentation             Assessment/Plan     * Lower respiratory infection (e.g., bronchitis, pneumonia, pneumonitis, pulmonitis)   Assessment & Plan    LRI resulting in COPD exacerbation  Patient presenting with productive cough and SOB, requiring oxygen during the day as well (uses oxygen at night at baseline)  procalcitonin of 0.12  WBC 13.5  Influenza negative    Plan:  Continue home duonebs and albuterol   RT  Pulse oxymetry and supplemental O2  C3 and Doxy for possible CAP, he has multiple co-morbidities         COPD (chronic obstructive pulmonary disease) (CMS-HCC)- (present on admission)   Assessment & Plan    Likely COPD exacerbation with LRI, possibly PNA  Patient presenting with productive cough and SOB, requiring oxygen during the day as well (uses oxygen at night at baseline)  procalcitonin of 0.12    Plan:  Continue home duonebs and albuterol   RT  Pulse oxymetry and supplemental O2  C3 and Doxy for possible CAP, he has multiple co-morbidities   Dexamethasone        Atrial fibrillation (CMS-HCC)- (present on admission)   Assessment & Plan    Likely paroxysmal, on exam at admission regular rhythm  A fib on anticoagulation - warfarin  Also on amiodarone and digoxin  INR 2.48  Plan:  Admit to tele  Warfarin per pharmacy        Hypertension- (present on admission)   Assessment & Plan    Continue home meds  Carvedilol, lasix, crestor, digoxin, lisinopril, lower dose than home of lisinopril  Holding spironolactone, admit bp on the low side        Anemia- (present on admission)   Assessment & Plan    Likely iron deficiency anemia with low serum iron, % saturation, and low normal ferritin  Patient refuses to get CONSTANZA and has never had a colonoscopy and does not want one  Stool occult blood ordered        CAD (coronary artery disease)-  (present on admission)   Assessment & Plan    CAD s/p CABG  Continue crestor, ASA and other heart failure meds        CHF (congestive heart failure) (CMS-[facility], located within [facility])- (present on admission)   Assessment & Plan    bnp 234  Echo: Left ventricle is severely dilated.  Left ventricular ejection fraction is visually estimated to be 25-30%.  Global hypokinesis with regional variation.  Aortic sclerosis without stenosis.  Mild aortic insufficiency.  Mild mitral regurgitation.    Plan:  Continue home meds  Carvedilol, lasix, crestor, digoxin, lisinopril, lower dose than home of lisinopril  Holding spironolactone, admit bp on the low side        Hyponatremia- (present on admission)   Assessment & Plan    Mild, 132  CTM for resolution  Patient on multiple diuretics            Anticipated Hospital stay: Observation admit        Quality Measures  Quality-Core Measures   Reviewed items::  Labs reviewed, Medications reviewed, Radiology images reviewed and EKG reviewed  Ceballos catheter::  No Ceballos  DVT: on warfarin for a fib.

## 2018-03-23 NOTE — ED PROVIDER NOTES
"ED Provider Note    CHIEF COMPLAINT  Chief Complaint   Patient presents with   • Shortness of Breath       HPI  Joshua Medrano is a 52 y.o. male who presents for evaluation of shortness of breath shortness of breath low grade fevers cough with change in sputum. The patient has extensive medical history as listed below including coronary artery disease atrial fibrillation and asthma. I saw the patient around 6 weeks ago and he is admitted for CHF exacerbation. He has been followed by cardiology. He had bypass surgery approximately 4 months ago. He denies high fever but does report some leg swelling and orthopnea and dyspnea on exertion. He reports like he feels like he has a \"cold\" because of runny nose cough and congestion. No associated chest pain no nausea vomiting or diarrhea    REVIEW OF SYSTEMS  See HPI for further details. No high fevers chills night sweats weight loss numbness tingling weakness All other systems are negative.     PAST MEDICAL HISTORY  Past Medical History:   Diagnosis Date   • ASTHMA    • Atrial fibrillation (CMS-[facility])    • CAD (coronary artery disease)    • Chronic obstructive pulmonary disease (CMS-[facility])    • Diabetes        FAMILY HISTORY  No history of bleeding disorder    SOCIAL HISTORY  Social History     Social History   • Marital status:      Spouse name: N/A   • Number of children: N/A   • Years of education: N/A     Social History Main Topics   • Smoking status: Former Smoker     Packs/day: 0.50     Years: 30.00     Types: Cigarettes     Quit date: 12/13/2017   • Smokeless tobacco: Never Used      Comment: 1/2-1.5 packs for 30 years   • Alcohol use No   • Drug use: No   • Sexual activity: Not on file     Other Topics Concern   • Not on file     Social History Narrative   • No narrative on file       SURGICAL HISTORY  Past Surgical History:   Procedure Laterality Date   • THORACOSCOPY Left 1/25/2018    Procedure: THORACOSCOPY- PLEURODESIS ;  Surgeon: Chandu Paez M.D.;  " Location: SURGERY [facility];  Service: General   • MULTIPLE CORONARY ARTERY BYPASS ENDO VEIN HARVEST  12/22/2017    Procedure: MULTIPLE CORONARY ARTERY BYPASS ENDO VEIN HARVEST X2;  Surgeon: Kiel Ash M.D.;  Location: SURGERY [facility];  Service: Cardiothoracic   • JIM  12/22/2017    Procedure: JIM;  Surgeon: Kiel Ash M.D.;  Location: SURGERY [facility];  Service: Cardiothoracic   • OTHER ABDOMINAL SURGERY         CURRENT MEDICATIONS  No current facility-administered medications for this encounter.     Current Outpatient Prescriptions:   •  torsemide (DEMADEX) 20 MG Tab, Take 2 Tabs by mouth 2 times a day., Disp: 120 Tab, Rfl: 11  •  carvedilol (COREG) 3.125 MG Tab, Take 1 Tab by mouth 2 times a day, with meals., Disp: 60 Tab, Rfl: 11  •  lisinopril (PRINIVIL) 10 MG Tab, Take 1 Tab by mouth every day., Disp: 90 Tab, Rfl: 3  •  amiodarone (CORDARONE) 200 MG Tab, Take 1 Tab by mouth every day., Disp: 90 Tab, Rfl: 3  •  digoxin (LANOXIN) 125 MCG Tab, Take 1 Tab by mouth every day at 6 PM., Disp: 90 Tab, Rfl: 3  •  rosuvastatin (CRESTOR) 40 MG tablet, Take 1 Tab by mouth every evening., Disp: 90 Tab, Rfl: 3  •  spironolactone (ALDACTONE) 50 MG Tab, Take 1 Tab by mouth every day., Disp: 90 Tab, Rfl: 3  •  aspirin (ASA) 81 MG Chew Tab chewable tablet, Take 1 Tab by mouth every day., Disp: 90 Tab, Rfl: 3  •  warfarin (COUMADIN) 5 MG Tab, Take two tablets daily as directed by [facility], Disp: 180 Tab, Rfl: 1  •  Non Formulary Request, Inhale 2 L/min by mouth as needed (shortness of breath). Oxygen via nasal cannula intermittent, Disp: , Rfl:   •  ipratropium-albuterol (DUONEB) 0.5-2.5 (3) MG/3ML nebulizer solution, 3 mL by Nebulization route every four hours as needed for Shortness of Breath., Disp: 30 Bullet, Rfl: 0  •  albuterol 108 (90 Base) MCG/ACT Aero Soln inhalation aerosol, Inhale 2 Puffs by mouth every 6 hours as needed for Shortness of Breath., Disp: 1 Inhaler,  "Rfl: 10      ALLERGIES  Allergies   Allergen Reactions   • Erythromycin Anaphylaxis   • Cillins [Penicillins] Rash     As a child, tolerated cefepime (12/2017), ceftriaxone (1/2018), cefazolin (12/2017)   • Metoprolol      Pt stated got latham and aggressive    • Shellfish Allergy Anaphylaxis     Pt stated that he is allergic to all seafood, will develop a rash and says that rash will clear up with benadryl       PHYSICAL EXAM  VITAL SIGNS: /74   Pulse 73   Temp 36.4 °C (97.5 °F)   Resp 18   Ht 1.956 m (6' 5\")   Wt (!) 132.6 kg (292 lb 5.3 oz)   SpO2 92%   BMI 34.67 kg/m²  Room air O2: 92    Constitutional: Obese patient appears chronically ill  HENT: Normocephalic, Atraumatic, Bilateral external ears normal, Oropharynx moist, No oral exudates, Nose normal.   Eyes: PERRLA, EOMI, Conjunctiva normal, No discharge.   Neck: Normal range of motion, No tenderness, Supple, No stridor.   Cardiovascular: Normal heart rate, Normal rhythm, No murmurs, No rubs, No gallops.   Thorax & Lungs: Bilateral rhonchi with rales.   Abdomen: Bowel sounds normal, Soft, No tenderness, No masses, No pulsatile masses.   Skin: Warm, Dry, No erythema, No rash.   Back: No tenderness, No CVA tenderness.    Extremities: Intact distal pulses, No edema, No tenderness, No cyanosis, No clubbing.   Musculoskeletal: Good range of motion in all major joints. No tenderness to palpation or major deformities noted.   Neurologic: Alert & oriented x 3, Normal motor function, Normal sensory function, No focal deficits noted.   Psychiatric: Anxious     EKG  Interpretation by me rate 70 to sinus rhythm. No acute ST segment elevation or pathological T-wave inversions no ectopy intervals and axis is normal suicidal ST depression noted in the lateral leads V5 and V6    RADIOLOGY/PROCEDURES  DX-CHEST-PORTABLE (1 VIEW)   Final Result      1.  Stable cardiomegaly      2.  Mild interstitial edema.      3.  Left basilar opacification likely represents a " small pleural effusion.        Results for orders placed or performed during the hospital encounter of 03/23/18   BTYPE NATRIURETIC PEPTIDE   Result Value Ref Range    B Natriuretic Peptide 234 (H) 0 - 100 pg/mL   CBC WITH DIFFERENTIAL   Result Value Ref Range    WBC 13.5 (H) 4.8 - 10.8 K/uL    RBC 4.89 4.70 - 6.10 M/uL    Hemoglobin 9.1 (L) 14.0 - 18.0 g/dL    Hematocrit 34.1 (L) 42.0 - 52.0 %    MCV 69.7 (L) 81.4 - 97.8 fL    MCH 18.6 (L) 27.0 - 33.0 pg    MCHC 26.7 (L) 33.7 - 35.3 g/dL    RDW 53.6 (H) 35.9 - 50.0 fL    Platelet Count 367 164 - 446 K/uL    MPV 9.7 9.0 - 12.9 fL    Neutrophils-Polys 74.70 (H) 44.00 - 72.00 %    Lymphocytes 8.60 (L) 22.00 - 41.00 %    Monocytes 7.90 0.00 - 13.40 %    Eosinophils 5.90 0.00 - 6.90 %    Basophils 1.30 0.00 - 1.80 %    Immature Granulocytes 1.60 (H) 0.00 - 0.90 %    Nucleated RBC 0.00 /100 WBC    Neutrophils (Absolute) 10.10 (H) 1.82 - 7.42 K/uL    Lymphs (Absolute) 1.16 1.00 - 4.80 K/uL    Monos (Absolute) 1.06 (H) 0.00 - 0.85 K/uL    Eos (Absolute) 0.80 (H) 0.00 - 0.51 K/uL    Baso (Absolute) 0.17 (H) 0.00 - 0.12 K/uL    Immature Granulocytes (abs) 0.21 (H) 0.00 - 0.11 K/uL    NRBC (Absolute) 0.00 K/uL    Hypochromia 1+     Anisocytosis 2+     Microcytosis 2+    COMP METABOLIC PANEL   Result Value Ref Range    Sodium 132 (L) 135 - 145 mmol/L    Potassium 4.2 3.6 - 5.5 mmol/L    Chloride 100 96 - 112 mmol/L    Co2 24 20 - 33 mmol/L    Anion Gap 8.0 0.0 - 11.9    Glucose 116 (H) 65 - 99 mg/dL    Bun 41 (H) 8 - 22 mg/dL    Creatinine 1.37 0.50 - 1.40 mg/dL    Calcium 9.5 8.5 - 10.5 mg/dL    AST(SGOT) 19 12 - 45 U/L    ALT(SGPT) 12 2 - 50 U/L    Alkaline Phosphatase 90 30 - 99 U/L    Total Bilirubin 0.4 0.1 - 1.5 mg/dL    Albumin 4.0 3.2 - 4.9 g/dL    Total Protein 8.4 (H) 6.0 - 8.2 g/dL    Globulin 4.4 (H) 1.9 - 3.5 g/dL    A-G Ratio 0.9 g/dL   TROPONIN   Result Value Ref Range    Troponin I 0.04 0.00 - 0.04 ng/mL   PROTHROMBIN TIME   Result Value Ref Range    PT 26.5  (H) 12.0 - 14.6 sec    INR 2.48 (H) 0.87 - 1.13   APTT   Result Value Ref Range    APTT 41.7 (H) 24.7 - 36.0 sec   DIGOXIN   Result Value Ref Range    Digoxin 0.6 (L) 0.8 - 2.0 ng/mL   INFLUENZA A/B BY PCR   Result Value Ref Range    Influenza virus A RNA Negative Negative    Influenza virus B, PCR Negative Negative   ESTIMATED GFR   Result Value Ref Range    GFR If African American >60 >60 mL/min/1.73 m 2    GFR If Non African American 54 (A) >60 mL/min/1.73 m 2   PERIPHERAL SMEAR REVIEW   Result Value Ref Range    Peripheral Smear Review see below    PLATELET ESTIMATE   Result Value Ref Range    Plt Estimation Normal    MORPHOLOGY   Result Value Ref Range    RBC Morphology Present     Poikilocytosis 1+     Tear Drop Cells 1+    DIFFERENTIAL COMMENT   Result Value Ref Range    Comments-Diff see below    EKG (ER)   Result Value Ref Range    Report       [facility] Emergency Dept.    Test Date:  2018  Pt Name:    JARRETT WHITE                   Department: ER  MRN:        [id number]                      Room:  Gender:     Male                         Technician: [id number]  :        1965                   Requested By:ER TRIAGE PROTOCOL  Order #:    [id number]                    Reading MD: YENNI MCNAIR MD    Measurements  Intervals                                Axis  Rate:       72                           P:          38  CT:         228                          QRS:        -36  QRSD:       98                           T:          165  QT:         380  QTc:        416    Interpretive Statements  SINUS RHYTHM  FIRST DEGREE AV BLOCK  LEFT AXIS DEVIATION  BORDERLINE R WAVE PROGRESSION, ANTERIOR LEADS  NONSPECIFIC T ABNORMALITIES, LATERAL LEADS  Compared to ECG 2018 13:38:44  First degree AV block now present  T-wave abnormality now present  Prolonged QT interval no longer present    Electronically Signed  On 3- 11:09:56 PDT by YENNI MCNAIR MD          COURSE & MEDICAL  DECISION MAKING  Pertinent Labs & Imaging studies reviewed. (See chart for details)  Patient presents here with borderline hypoxia with rhonchi and rales. Clinically she reports low-grade fever and productive cough. No evidence of sepsis influenza A and B is negative. Blood cultures have been performed. He likely has early pneumonia and/or bronchitis. He'll be given IV Rocephin and admitted to internal medicine    FINAL IMPRESSION  1. Acute bronchitis    Admission         Electronically signed by: René Alvarez, 3/23/2018 10:54 AM

## 2018-03-23 NOTE — PROGRESS NOTES
Inpatient Anticoagulation Service Note    Date: 3/23/2018  Reason for Anticoagulation: Atrial Fibrillation   [id number] VASc Score: 3    Hemoglobin Value: (!) 9.1  Hematocrit Value: (!) 34.1  Lab Platelet Value: 367  Target INR: 2.0 to 3.0    INR from last 7 days     Date/Time INR Value    03/23/18 1105 (!)  2.48        Dose from last 7 days     Date/Time Dose (mg)    03/23/18 1400  10        Average Dose (mg):  (home dose: 5 mg Mon, 10 mg AOD)  Significant Interactions: Amiodarone, Aspirin, Statin  Bridge Therapy: No     Comments: New admission for respiratory infection. INR is therapeutic on admission for history of a fib. H/H low, but trending up compared to last admission. Okay to conitnue current dosing. Home regimen = 5 mg Mondays, 10 mg all other days. Warfarin protocol ordered, interactions noted. Will follow.    Plan:  10 mg tonight (home dose), INR tomorrow  Education Material Provided?: No (chronic warfarin patient)  Pharmacist suggested discharge dosing: likely home dose of 5 mg Mondays, 10 mg all other days     Jazmin Melgar, PHARMD

## 2018-03-23 NOTE — PROGRESS NOTES
"Heart Failure Established Appointment     HF New 2/26/2018, HF RN unavailable at that visit, education performed today.    6MWT- see previous visit  MLWHF- see previous visit    OP Heart Failure  Vitals  Appointment Type: Heart Failure Established  Weight: (!) 135.6 kg (299 lb)  How Weight Obtained: Stand Up Scale  Height: 195.6 cm (6' 5\")  BMI (Calculated): 35.46  Blood Pressure: 100/56  Pulse: 74    System Assessment  NYHA Functional Class Assessment: Class II  ACC/AHA HF Stage: C    Smoking Hx  Have you Ever Smoked: Yes  Have you Smoked in the Last 12 Mos: Yes  Have you Quit Smoking: Yes  Confirm Quit Date: 12/13/17     Alcohol Hx  Do you Drink?: No     Illicit Drug Hx  Illicit Drug History: No    Social Hx  Social History: Lives with Spouse  Level of Support: Good  Advance Directives: Began discussion, Paperwork provided    Education  Symptoms: Verbalizes understanding  Weighing: Verbalizes Understanding  Weight Gain Response: Verbalizes Understanding   Recording Data: Verbalizes understanding  Teach Back Failures: Teach Back Successful  Compliance: Patient is Compliant     Medications  Medication Reconciliation : Complete  Medication Counseling Provided: Needs Reinforcement  Able to Accurately Identify Medication Indications: Some  Medication Discrepancies: None  Is Patient on an Evidence Based Beta Blocker: Yes  Is Patient on ACE-1 or ARB: Yes    Dietary Assessment  Food Labels: Needs Reinforcement  Foods High in Sodium: Needs Reinforcement  Daily Sodium Intake: Needs Reinforcement  Diet: Needs Reinforcement  Food Preparation: Needs Reinforcement  Eating Out Plan: Needs Reinforcement  Healthier Options: Needs Reinforcement  Fluid Restriction: Not Applicable    MN Living with Heart Failure  Swelling in Ankles or Legs:  (see previous visit)    6 Minute Walk Test  Baseline to end of test: see previous visit          Education Narrative  Reviewed anatomy and physiology of heart failure with patient, wife and " daughter. Went over heart failure worksheet and patient's individual HF diagnosis, EF, risk factors, general medication classes and indications, as well as personal goals.  Goals: Patient's primary goal is to improve exercise tolerance.    Discussed daily weights, sodium restriction, worsening signs and symptoms to report to physician, heart medications, and importance of adherence to medication regimen. Emphasized recommendation from AHA/AAHFN to keep daily sodium intake between 1500mg-2000mg.     Patient being cared for by family at this time, they are ensuring compliance with diet.     Reviewed dietary handouts, advanced care planning classes, and advance directive planning handout. Discussed dietary considerations and reviewed Seven Day Heart Healthy Meal Plan by [employer].    Invited patient and family members/friends to HF support group and encouraged patient to call Heart Failure clinic during normal business hours with any questions.  Heart Failure program card with number given to patient.        Patient and family state full understanding of all information given.     Yeni HF RN  [id number]

## 2018-03-23 NOTE — SENIOR ADMIT NOTE
53 yo M with history of COPD on nocturnal 2 L oxygen, asthma, HFrEF (January 2018 EF 25-30%), CAD status post 3 vessel CABG, atrial fibrillation on warfarin, diabetes presents worse fever, runny nose, nasal congestion, cough with green sputum, worsening shortness of breath and wheezing.  A febrile, mallampati 1-2, distant heart sounds, heart rate 60s, tachypneic blood pressure 110s over 60s, 2-2.5 L nasal cannula. Decreased breath sounds bilaterally, rhonchi with rales. Patient declines digital rectal exam.  WBC 13.5, neutrophil 74%, H&H 5.1 and 34.1, platelet 367, sodium 132, glucose 116, BUN 41, creatinine 1.37, GFR 54, troponin 0.04, , INR 2.48, influenza negative, procalcitonin 0.12    DX-CHEST-PORTABLE (1 VIEW)   Final Result      1.  Stable cardiomegaly      2.  Mild interstitial edema.      3.  Left basilar opacification likely represents a small pleural effusion.        #COPD exacerbation  -Oxygen, RT, breathing treatment  -Empiric ceftriaxone and doxycycline  - Prednisone 40 mg daily  -Symptomatic management    # Atrial fibrillation: Amiodarone, digoxin, Warfarin  # HFrEF: January 2018 EF 25-30%. Coreg, Lasix, lowered dose of lisinopril due to low BP  # CAD status post 3 vessel CABG: Aspirin, statin, Coreg, lisinopril  # type 2 diabetes: diet control, check A1c  # CKD stage 3: Avoid nephrotoxin, renal dose medications.  # obesity: Llifestyle modification    Full code  warfarin

## 2018-03-23 NOTE — DISCHARGE PLANNING
Care Transition Team Assessment    Information Source  Orientation : Oriented x 4  Information Given By: Patient  Who is responsible for making decisions for patient? : Patient    Readmission Evaluation  Is this a readmission?: No    Elopement Risk  Legal Hold: No    Interdisciplinary Discharge Planning  Lives with - Patient's Self Care Capacity: Spouse  Support Systems: Spouse / Significant Other  Able to Return to Previous ADL's: Yes  Mobility Issues: No  Prior Services: None  Patient Expects to be Discharged to:: home  Assistance Needed: No  Durable Medical Equipment: Home Oxygen  DME Provider / Phone: [facility]              Finances  Financial Barriers to Discharge: No  Prescription Coverage: Yes

## 2018-03-24 VITALS
TEMPERATURE: 96.8 F | OXYGEN SATURATION: 98 % | HEIGHT: 77 IN | DIASTOLIC BLOOD PRESSURE: 61 MMHG | RESPIRATION RATE: 16 BRPM | SYSTOLIC BLOOD PRESSURE: 121 MMHG | BODY MASS INDEX: 34.28 KG/M2 | WEIGHT: 290.35 LBS | HEART RATE: 63 BPM

## 2018-03-24 LAB
ALBUMIN SERPL BCP-MCNC: 3.9 G/DL (ref 3.2–4.9)
ALBUMIN/GLOB SERPL: 0.9 G/DL
ALP SERPL-CCNC: 84 U/L (ref 30–99)
ALT SERPL-CCNC: 12 U/L (ref 2–50)
ANION GAP SERPL CALC-SCNC: 9 MMOL/L (ref 0–11.9)
AST SERPL-CCNC: 16 U/L (ref 12–45)
BASOPHILS # BLD AUTO: 0.7 % (ref 0–1.8)
BASOPHILS # BLD: 0.09 K/UL (ref 0–0.12)
BILIRUB SERPL-MCNC: 0.4 MG/DL (ref 0.1–1.5)
BUN SERPL-MCNC: 39 MG/DL (ref 8–22)
CALCIUM SERPL-MCNC: 9.2 MG/DL (ref 8.5–10.5)
CHLORIDE SERPL-SCNC: 101 MMOL/L (ref 96–112)
CO2 SERPL-SCNC: 23 MMOL/L (ref 20–33)
CREAT SERPL-MCNC: 1.41 MG/DL (ref 0.5–1.4)
EOSINOPHIL # BLD AUTO: 0.03 K/UL (ref 0–0.51)
EOSINOPHIL NFR BLD: 0.2 % (ref 0–6.9)
ERYTHROCYTE [DISTWIDTH] IN BLOOD BY AUTOMATED COUNT: 53.1 FL (ref 35.9–50)
FOLATE SERPL-MCNC: 20.6 NG/ML
GLOBULIN SER CALC-MCNC: 4.3 G/DL (ref 1.9–3.5)
GLUCOSE SERPL-MCNC: 142 MG/DL (ref 65–99)
GRAM STN SPEC: NORMAL
HCT VFR BLD AUTO: 33.3 % (ref 42–52)
HEMOCCULT STL QL: NEGATIVE
HGB BLD-MCNC: 8.7 G/DL (ref 14–18)
HGB RETIC QN AUTO: 17.7 PG/CELL (ref 29–35)
IMM GRANULOCYTES # BLD AUTO: 0.26 K/UL (ref 0–0.11)
IMM GRANULOCYTES NFR BLD AUTO: 2.1 % (ref 0–0.9)
IMM RETICS NFR: 31.3 % (ref 9.3–17.4)
INR PPP: 3.08 (ref 0.87–1.13)
LYMPHOCYTES # BLD AUTO: 0.66 K/UL (ref 1–4.8)
LYMPHOCYTES NFR BLD: 5.3 % (ref 22–41)
MAGNESIUM SERPL-MCNC: 2.2 MG/DL (ref 1.5–2.5)
MCH RBC QN AUTO: 18.3 PG (ref 27–33)
MCHC RBC AUTO-ENTMCNC: 26.1 G/DL (ref 33.7–35.3)
MCV RBC AUTO: 70 FL (ref 81.4–97.8)
MONOCYTES # BLD AUTO: 0.43 K/UL (ref 0–0.85)
MONOCYTES NFR BLD AUTO: 3.5 % (ref 0–13.4)
MORPHOLOGY BLD-IMP: NORMAL
NEUTROPHILS # BLD AUTO: 10.93 K/UL (ref 1.82–7.42)
NEUTROPHILS NFR BLD: 88.2 % (ref 44–72)
NRBC # BLD AUTO: 0 K/UL
NRBC BLD-RTO: 0 /100 WBC
PLATELET # BLD AUTO: 353 K/UL (ref 164–446)
PMV BLD AUTO: 9.1 FL (ref 9–12.9)
POTASSIUM SERPL-SCNC: 4.4 MMOL/L (ref 3.6–5.5)
PROT SERPL-MCNC: 8.2 G/DL (ref 6–8.2)
PROTHROMBIN TIME: 31.5 SEC (ref 12–14.6)
RBC # BLD AUTO: 4.76 M/UL (ref 4.7–6.1)
RETICS # AUTO: 0.09 M/UL (ref 0.04–0.06)
RETICS/RBC NFR: 1.9 % (ref 0.8–2.1)
SIGNIFICANT IND 70042: NORMAL
SITE SITE: NORMAL
SODIUM SERPL-SCNC: 133 MMOL/L (ref 135–145)
SOURCE SOURCE: NORMAL
VIT B12 SERPL-MCNC: 548 PG/ML (ref 211–911)
WBC # BLD AUTO: 12.4 K/UL (ref 4.8–10.8)

## 2018-03-24 PROCEDURE — 665999 HH PPS REVENUE DEBIT

## 2018-03-24 PROCEDURE — A9270 NON-COVERED ITEM OR SERVICE: HCPCS | Performed by: INTERNAL MEDICINE

## 2018-03-24 PROCEDURE — 87205 SMEAR GRAM STAIN: CPT

## 2018-03-24 PROCEDURE — 700101 HCHG RX REV CODE 250: Performed by: STUDENT IN AN ORGANIZED HEALTH CARE EDUCATION/TRAINING PROGRAM

## 2018-03-24 PROCEDURE — 94640 AIRWAY INHALATION TREATMENT: CPT

## 2018-03-24 PROCEDURE — 83735 ASSAY OF MAGNESIUM: CPT

## 2018-03-24 PROCEDURE — 85610 PROTHROMBIN TIME: CPT

## 2018-03-24 PROCEDURE — 99217 PR OBSERVATION CARE DISCHARGE: CPT | Mod: GC | Performed by: INTERNAL MEDICINE

## 2018-03-24 PROCEDURE — A9270 NON-COVERED ITEM OR SERVICE: HCPCS | Performed by: STUDENT IN AN ORGANIZED HEALTH CARE EDUCATION/TRAINING PROGRAM

## 2018-03-24 PROCEDURE — 82746 ASSAY OF FOLIC ACID SERUM: CPT

## 2018-03-24 PROCEDURE — 85025 COMPLETE CBC W/AUTO DIFF WBC: CPT

## 2018-03-24 PROCEDURE — 82607 VITAMIN B-12: CPT

## 2018-03-24 PROCEDURE — 700102 HCHG RX REV CODE 250 W/ 637 OVERRIDE(OP): Performed by: STUDENT IN AN ORGANIZED HEALTH CARE EDUCATION/TRAINING PROGRAM

## 2018-03-24 PROCEDURE — 700111 HCHG RX REV CODE 636 W/ 250 OVERRIDE (IP): Performed by: INTERNAL MEDICINE

## 2018-03-24 PROCEDURE — 85046 RETICYTE/HGB CONCENTRATE: CPT

## 2018-03-24 PROCEDURE — G0378 HOSPITAL OBSERVATION PER HR: HCPCS

## 2018-03-24 PROCEDURE — 80053 COMPREHEN METABOLIC PANEL: CPT

## 2018-03-24 PROCEDURE — 82272 OCCULT BLD FECES 1-3 TESTS: CPT

## 2018-03-24 PROCEDURE — 700102 HCHG RX REV CODE 250 W/ 637 OVERRIDE(OP): Performed by: INTERNAL MEDICINE

## 2018-03-24 PROCEDURE — 36415 COLL VENOUS BLD VENIPUNCTURE: CPT

## 2018-03-24 PROCEDURE — 665998 HH PPS REVENUE CREDIT

## 2018-03-24 RX ORDER — FERROUS SULFATE 325(65) MG
325 TABLET ORAL
Status: DISCONTINUED | OUTPATIENT
Start: 2018-03-24 | End: 2018-03-24 | Stop reason: HOSPADM

## 2018-03-24 RX ORDER — GUAIFENESIN/DEXTROMETHORPHAN 100-10MG/5
10 SYRUP ORAL EVERY 6 HOURS PRN
Qty: 840 ML | Refills: 0 | Status: SHIPPED | OUTPATIENT
Start: 2018-03-24 | End: 2018-04-06

## 2018-03-24 RX ORDER — FUROSEMIDE 20 MG/1
20 TABLET ORAL
Status: DISCONTINUED | OUTPATIENT
Start: 2018-03-24 | End: 2018-03-24 | Stop reason: HOSPADM

## 2018-03-24 RX ORDER — DOXYCYCLINE 100 MG/1
100 TABLET ORAL EVERY 12 HOURS
Qty: 13 TAB | Refills: 0 | Status: SHIPPED | OUTPATIENT
Start: 2018-03-24 | End: 2018-04-06

## 2018-03-24 RX ORDER — FERROUS SULFATE 325(65) MG
325 TABLET ORAL
Qty: 30 TAB | Refills: 3 | Status: SHIPPED | OUTPATIENT
Start: 2018-03-25 | End: 2018-04-06

## 2018-03-24 RX ORDER — PREDNISONE 20 MG/1
30 TABLET ORAL DAILY
Qty: 6 TAB | Refills: 0 | Status: SHIPPED | OUTPATIENT
Start: 2018-03-24 | End: 2018-04-09

## 2018-03-24 RX ORDER — FUROSEMIDE 20 MG/1
20 TABLET ORAL
Status: DISCONTINUED | OUTPATIENT
Start: 2018-03-24 | End: 2018-03-24

## 2018-03-24 RX ORDER — LISINOPRIL 10 MG/1
5 TABLET ORAL EVERY EVENING
Qty: 30 TAB | Refills: 1 | Status: SHIPPED | OUTPATIENT
Start: 2018-03-24 | End: 2018-07-24

## 2018-03-24 RX ORDER — WARFARIN SODIUM 5 MG/1
5 TABLET ORAL
Status: DISCONTINUED | OUTPATIENT
Start: 2018-03-24 | End: 2018-03-24 | Stop reason: HOSPADM

## 2018-03-24 RX ORDER — LISINOPRIL 2.5 MG/1
2.5 TABLET ORAL
Status: DISCONTINUED | OUTPATIENT
Start: 2018-03-24 | End: 2018-03-24

## 2018-03-24 RX ADMIN — IPRATROPIUM BROMIDE AND ALBUTEROL SULFATE 3 ML: .5; 3 SOLUTION RESPIRATORY (INHALATION) at 02:12

## 2018-03-24 RX ADMIN — DOXYCYCLINE 100 MG: 100 TABLET ORAL at 09:52

## 2018-03-24 RX ADMIN — Medication 325 MG: at 09:48

## 2018-03-24 RX ADMIN — IPRATROPIUM BROMIDE AND ALBUTEROL SULFATE 3 ML: .5; 3 SOLUTION RESPIRATORY (INHALATION) at 06:36

## 2018-03-24 RX ADMIN — ASPIRIN 81 MG: 81 TABLET, CHEWABLE ORAL at 09:48

## 2018-03-24 RX ADMIN — FUROSEMIDE 20 MG: 20 TABLET ORAL at 09:48

## 2018-03-24 RX ADMIN — STANDARDIZED SENNA CONCENTRATE AND DOCUSATE SODIUM 2 TABLET: 8.6; 5 TABLET, FILM COATED ORAL at 09:48

## 2018-03-24 RX ADMIN — BUDESONIDE AND FORMOTEROL FUMARATE DIHYDRATE 2 PUFF: 160; 4.5 AEROSOL RESPIRATORY (INHALATION) at 06:37

## 2018-03-24 RX ADMIN — CARVEDILOL 3.12 MG: 3.12 TABLET, FILM COATED ORAL at 09:48

## 2018-03-24 RX ADMIN — AMIODARONE HYDROCHLORIDE 200 MG: 200 TABLET ORAL at 09:49

## 2018-03-24 RX ADMIN — PREDNISONE 40 MG: 20 TABLET ORAL at 09:48

## 2018-03-24 ASSESSMENT — PAIN SCALES - GENERAL
PAINLEVEL_OUTOF10: 0
PAINLEVEL_OUTOF10: 0

## 2018-03-24 ASSESSMENT — PATIENT HEALTH QUESTIONNAIRE - PHQ9
1. LITTLE INTEREST OR PLEASURE IN DOING THINGS: NOT AT ALL
SUM OF ALL RESPONSES TO PHQ9 QUESTIONS 1 AND 2: 0
2. FEELING DOWN, DEPRESSED, IRRITABLE, OR HOPELESS: NOT AT ALL

## 2018-03-24 ASSESSMENT — LIFESTYLE VARIABLES
EVER_SMOKED: NEVER
DO YOU DRINK ALCOHOL: NO

## 2018-03-24 ASSESSMENT — PAIN SCALES - WONG BAKER: WONGBAKER_NUMERICALRESPONSE: DOESN'T HURT AT ALL

## 2018-03-24 NOTE — FLOWSHEET NOTE
03/24/18 0637   Interdisciplinary Plan of Care-Goals (Indications)   Obstructive Ventilatory Defect or Pulmonary Disease without Obvious Obstruction History / Diagnosis   Interdisciplinary Plan of Care-Outcomes    Bronchodilator Outcome Patient at Stable Baseline   Education   Education Yes - Pt. / Family has been Instructed in use of Respiratory Medications and Adverse Reactions   RT Assessment of Delivered Medications   Evaluation of Medication Delivery Daily Yes-- Pt /Family has been Instructed in use of Respiratory Medications and Adverse Reactions   SVN Group   #SVN Performed Yes   Given By: Mouthpiece   Date SVN Next Change Due (Q 7 Days) 03/30/18   MDI/DPI Group   #MDI/DPI Given MDI/DPI x 1   Respiratory WDL   Respiratory (WDL) X   Chest Exam   Respiration 18   Pulse 66   Breath Sounds   Pre/Post Intervention Pre Intervention Assessment   RUL Breath Sounds Diminished   RML Breath Sounds Diminished   RLL Breath Sounds Diminished   GENNARO Breath Sounds Diminished   LLL Breath Sounds Diminished   Oximetry   Continuous Oximetry Yes   O2 Alarms Set & Reviewed Yes   Oxygen   Pulse Oximetry 97 %   O2 (LPM) 2   O2 Daily Delivery Respiratory  Silicone Nasal Cannula

## 2018-03-24 NOTE — PROGRESS NOTES
Assumed patient care, seen by MD at bedside. Ambulates well and in room air. No shortness of breath. Discuss plan of care.

## 2018-03-24 NOTE — DISCHARGE INSTRUCTIONS
Discharge Instructions    Discharged to home by car with self. Discharged via walking, hospital escort: Yes.  Special equipment needed: Not Applicable    Be sure to schedule a follow-up appointment with your primary care doctor or any specialists as instructed.     Discharge Plan:   Diet Plan: Discussed  Activity Level: Discussed  Confirmed Follow up Appointment: Patient to Call and Schedule Appointment  Confirmed Symptoms Management: Discussed  Medication Reconciliation Updated: Yes  Influenza Vaccine Indication: Patient Refuses    I understand that a diet low in cholesterol, fat, and sodium is recommended for good health. Unless I have been given specific instructions below for another diet, I accept this instruction as my diet prescription.   Other diet: Regular Diet    Special Instructions: None    · Is patient discharged on Warfarin / Coumadin?   No     Depression / Suicide Risk    As [facility] are [facility], it is important to learn how to keep safe from harming yourself.    Recognize the warning signs:  · Abrupt changes in personality, positive or negative- including increase in energy   · Giving away possessions  · Change in eating patterns- significant weight changes-  positive or negative  · Change in sleeping patterns- unable to sleep or sleeping all the time   · Unwillingness or inability to communicate  · Depression  · Unusual sadness, discouragement and loneliness  · Talk of wanting to die  · Neglect of personal appearance   · Rebelliousness- reckless behavior  · Withdrawal from people/activities they love  · Confusion- inability to concentrate     If you or a loved one observes any of these behaviors or has concerns about self-harm, here's what you can do:  · Talk about it- your feelings and reasons for harming yourself  · Remove any means that you might use to hurt yourself (examples: pills, rope, extension cords, firearm)  · Get professional help from the community  (Mental Health, Substance Abuse, psychological counseling)  · Do not be alone:Call your Safe Contact- someone whom you trust who will be there for you.  · Call your local CRISIS HOTLINE 179-8404 or 114-430-4443  · Call your local Children's Mobile Crisis Response Team [place] (235) 881-4449 or www.[facility]  · Call the toll free National Suicide Prevention Hotlines   · National Suicide Prevention Lifeline 594-205-VHWY (1704)  · National Hope Line Network 800-SUICIDE (371-2272)

## 2018-03-24 NOTE — RESPIRATORY CARE
COPD EDUCATION by COPD CLINICAL EDUCATOR  3/24/2018 at 7:34 AM by Gila Patten     Patient reviewed by COPD education team. Patient does not qualify for COPD program.

## 2018-03-24 NOTE — PROGRESS NOTES
Received in bed, aox4, afib  on monitor. Assessment as per CDU. Call light within reach. Needs attended. Plan of care discussed and understood.

## 2018-03-24 NOTE — DISCHARGE SUMMARY
Internal Medicine Discharge Summary  Note Author: Nam Le M.D.       Admit Date:  3/23/2018       Discharge Date:   3/24/2018    Service:   [facility] Internal Medicine Blue Team  Attending Physician(s):   Dr. Michael       Senior Resident(s):   Dr. Luis  Julius Resident(s):   Dr. Le      Primary Diagnosis:   COPD exacerbation due to infectious bronchitis    Secondary Diagnoses:                Principal Problem:    Lower respiratory infection (e.g., bronchitis, pneumonia, pneumonitis, pulmonitis) POA: Unknown  Active Problems:    COPD (chronic obstructive pulmonary disease) (CMS-HCC) POA: Yes    Atrial fibrillation (CMS-HCC) POA: Yes    Hyponatremia POA: Yes    CHF (congestive heart failure) (CMS-HCC) POA: Yes    CAD (coronary artery disease) POA: Yes    Anemia POA: Yes    Hypertension POA: Yes  Resolved Problems:    * No resolved hospital problems. *      Hospital Summary (Brief Narrative):       51 y/o male with complex medical history of COPD, asthma, Diabetes (on no meds), CAD with 2 vessel CABG, A-fib, DLD, anemia presented with complaint of cough and increased SOB not improving with his home inhalers. Cough started 5 days ago and gradually worsened to become productive of greenish sputum. He denied constitutional symptoms.    He was treated for COPD exacerbation due to bronchitis with C3, doxycycline, and prednisone along with respiratory therapy per RT. His BNP (234), trops, and pro calcitonin labs were not significant and he had improvement in his symptoms. He was discharged home in stable condition with doxycycline and prednisone with instructions to follow up with cardiology and primary care.     Patient /Hospital Summary (Details -- Problem Oriented) :          COPD (chronic obstructive pulmonary disease) (CMS-HCC)   Assessment & Plan    Likely COPD exacerbation with LRI, possibly PNA  Patient presenting with productive cough and SOB, requiring oxygen during the day as well (uses oxygen at night at  baseline)  procalcitonin of 0.12    Plan:  Continue home duonebs and albuterol   Discharged on doxycycline and prednisone        * Lower respiratory infection (e.g., bronchitis, pneumonia, pneumonitis, pulmonitis)   Assessment & Plan    LRI resulting in COPD exacerbation  Patient presenting with productive cough and SOB, requiring oxygen during the day as well (uses oxygen at night at baseline)  procalcitonin of 0.12  WBC 13.5  Influenza negative    Plan:  Continue home duonebs and albuterol   Discharged on doxycycline and prednisone        Atrial fibrillation (CMS-HCC)   Assessment & Plan    Likely paroxysmal, on exam at admission regular rhythm  A fib on anticoagulation - warfarin  Also on amiodarone and digoxin  INR 2.48  Plan:  Continue home warfarin, amiodarone and digoxin        Hypertension   Assessment & Plan    Continue home meds  Carvedilol, spironolactone, lasix, crestor, digoxin, lisinopril, lower dose than home of lisinopril        Anemia   Assessment & Plan    Likely iron deficiency anemia with low serum iron, % saturation, and low normal ferritin  Patient refuses to get CONSTANZA and has never had a colonoscopy and does not want one  Patient has risk factors for malignancy, but he adamantly refuses to get a colonoscopy  Stool occult negative    Iron supplementation        CAD (coronary artery disease)   Assessment & Plan    CAD s/p CABG  Continue crestor, ASA and other heart failure meds        CHF (congestive heart failure) (CMS-HCC)   Assessment & Plan    bnp 234  Echo: Left ventricle is severely dilated.  Left ventricular ejection fraction is visually estimated to be 25-30%.  Global hypokinesis with regional variation.  Aortic sclerosis without stenosis.  Mild aortic insufficiency.  Mild mitral regurgitation.    Plan:  Continue home meds  Carvedilol, spironolactone,lasix, crestor, digoxin, lisinopril, lower dose than home of lisinopril at 5mg - follow up with cardiology        Hyponatremia   Assessment &  Plan    Mild, 132  Patient on multiple diuretics            Consultants:     LOIDA    Procedures:        NA    Imaging/ Testing:      DX-CHEST-PORTABLE (1 VIEW)   Final Result      1.  Stable cardiomegaly      2.  Mild interstitial edema.      3.  Left basilar opacification likely represents a small pleural effusion.            Discharge Medications:         Medication Reconciliation: Completed       Medication List      START taking these medications      Instructions   doxycycline monohydrate 100 MG tablet  Commonly known as:  ADOXA   Take 1 Tab by mouth every 12 hours.  Dose:  100 mg     ferrous sulfate 325 (65 Fe) MG tablet  Start taking on:  3/25/2018   Take 1 Tab by mouth every morning with breakfast.  Dose:  325 mg     guaiFENesin dextromethorphan 100-10 MG/5ML Syrp syrup  Commonly known as:  ROBITUSSIN DM   Take 10 mL by mouth every 6 hours as needed for Cough.  Dose:  10 mL     predniSONE 20 MG Tabs  Commonly known as:  DELTASONE   Take 1.5 Tabs by mouth every day.  Dose:  30 mg        CHANGE how you take these medications      Instructions   lisinopril 10 MG Tabs  What changed:  how much to take  Commonly known as:  PRINIVIL   Take 0.5 Tabs by mouth every evening.  Dose:  5 mg        CONTINUE taking these medications      Instructions   albuterol 108 (90 Base) MCG/ACT Aers inhalation aerosol   Inhale 2 Puffs by mouth every 6 hours as needed for Shortness of Breath.  Dose:  2 Puff     amiodarone 200 MG Tabs  Commonly known as:  CORDARONE   Take 1 Tab by mouth every day.  Dose:  200 mg     aspirin 81 MG Chew chewable tablet  Commonly known as:  ASA   Take 1 Tab by mouth every day.  Dose:  81 mg     carvedilol 3.125 MG Tabs  Commonly known as:  COREG   Take 1 Tab by mouth 2 times a day, with meals.  Dose:  3.125 mg     digoxin 125 MCG Tabs  Commonly known as:  LANOXIN   Take 1 Tab by mouth every day at 6 PM.  Dose:  125 mcg     furosemide 20 MG Tabs  Commonly known as:  LASIX   Take 20 mg by mouth 2 times a  day.  Dose:  20 mg     ipratropium-albuterol 0.5-2.5 (3) MG/3ML nebulizer solution  Commonly known as:  DUONEB   3 mL by Nebulization route every four hours as needed for Shortness of Breath.  Dose:  3 mL     rosuvastatin 40 MG tablet  Commonly known as:  CRESTOR   Take 1 Tab by mouth every evening.  Dose:  40 mg     spironolactone 50 MG Tabs  Commonly known as:  ALDACTONE   Take 1 Tab by mouth every day.  Dose:  50 mg     warfarin 5 MG Tabs  Commonly known as:  COUMADIN   Take 5-10 mg by mouth every day. 5 mg on Mondays 10 mg all other days  Dose:  5-10 mg              Disposition:   Stable for discharge    Diet:   Cardiac    Activity:   As tolerated and encouraged    Instructions:       The patient was instructed to return to the ER in the event of worsening symptoms. I have counseled the patient on the importance of compliance and the patient has agreed to proceed with all medical recommendations and follow up plan indicated above.   The patient understands that all medications come with benefits and risks. Risks may include permanent injury or death and these risks can be minimized with close reassessment and monitoring.        Primary Care Provider:      Discharge summary faxed to primary care provider:  Completed  Copy of discharge summary given to the patient: Completed      Follow up appointment details :      Mar 27, 2018  3:40 PM PDT  CARE MANAGER TELEPHONE VISIT with CARE MANAGER  [address] [address]  [phone]   IMPORTANT This is a phone visit only. Do not come into the office. The Care Manager will call you at the scheduled time for Care Manager Telephone Visit.   Mar 29, 2018  9:00 AM PDT  Established Patient with Zohreh James M.D.  [address] [address]  [phone]   You will be receiving a confirmation call a few days before your appointment from our automated call confirmation  system.   Apr 02, 2018  2:45 PM PDT  Established Patient with [facility] EXAM 4  [facility]  (--) [address]  [facility] [address]  [phone]   Apr 04, 2018 10:15 AM PDT  Heart Failure Established with Jett Bedoya M.D.  [facility] (--) [address]  [facility] [address]  [phone]   Apr 19, 2018  1:15 PM PDT  Established Patient with Nam Le M.D.  [facility] Internal [facility] (--) [address]  [address]  [facility] [address]  [phone]         Pending Studies:        NA    Time spent on discharge day patient visit, preparing discharge paperwork and arranging for patient follow up.    Summary of follow up issues:   COPD exacerbation improvement  Chronic medical condition   Elevated eosinophils and basophils on admission, resolved on discharge    Discharge Time (Minutes) :    45  Hospital Course Type: Observation Stay      Condition on Discharge    ______________________________________________________________________    Interval history/exam for day of discharge:     Patient in good spirits and ambulating. He feels well and says his SOB has improved.     Vitals:    03/24/18 0212 03/24/18 0402 03/24/18 0637 03/24/18 0731   BP:  108/59  121/61   Pulse: 62 64 66 63   Resp: 16 17 18 16   Temp:  36.4 °C (97.6 °F)  36 °C (96.8 °F)   SpO2: 95% 94% 97% 98%   Weight:    (!) 131.7 kg (290 lb 5.5 oz)   Height:         Weight/BMI: Body mass index is 34.43 kg/m².  Pulse Oximetry: 98 %, O2 (LPM): 2, O2 Delivery: Nasal Cannula    General: alert and oriented in no acute distress  CVS: distant heart sounds, no murmurs rubs or gallops  PULM: clear to auscultation bilaterally  Extremities: +2 pitting edema    Most Recent Labs:    Lab Results   Component Value Date/Time    WBC 12.4 (H) 03/24/2018 04:17 AM    RBC 4.76 03/24/2018 04:17 AM    HEMOGLOBIN 8.7 (L) 03/24/2018 04:17 AM     HEMATOCRIT 33.3 (L) 03/24/2018 04:17 AM    MCV 70.0 (L) 03/24/2018 04:17 AM    MCH 18.3 (L) 03/24/2018 04:17 AM    MCHC 26.1 (L) 03/24/2018 04:17 AM    MPV 9.1 03/24/2018 04:17 AM    NEUTSPOLYS 88.20 (H) 03/24/2018 04:17 AM    LYMPHOCYTES 5.30 (L) 03/24/2018 04:17 AM    MONOCYTES 3.50 03/24/2018 04:17 AM    EOSINOPHILS 0.20 03/24/2018 04:17 AM    EOSINOPHILS 1 01/03/2018 03:45 PM    BASOPHILS 0.70 03/24/2018 04:17 AM    HYPOCHROMIA 1+ 03/23/2018 11:05 AM    ANISOCYTOSIS 2+ 03/23/2018 11:05 AM      Lab Results   Component Value Date/Time    SODIUM 133 (L) 03/24/2018 04:17 AM    POTASSIUM 4.4 03/24/2018 04:17 AM    CHLORIDE 101 03/24/2018 04:17 AM    CO2 23 03/24/2018 04:17 AM    GLUCOSE 142 (H) 03/24/2018 04:17 AM    BUN 39 (H) 03/24/2018 04:17 AM    CREATININE 1.41 (H) 03/24/2018 04:17 AM      Lab Results   Component Value Date/Time    ALTSGPT 12 03/24/2018 04:17 AM    ASTSGOT 16 03/24/2018 04:17 AM    ALKPHOSPHAT 84 03/24/2018 04:17 AM    TBILIRUBIN 0.4 03/24/2018 04:17 AM    LIPASE 46 04/30/2017 04:37 PM    ALBUMIN 3.9 03/24/2018 04:17 AM    GLOBULIN 4.3 (H) 03/24/2018 04:17 AM    PREALBUMIN 10.0 (L) 01/29/2018 03:58 AM    INR 3.08 (H) 03/24/2018 04:17 AM    MACROCYTOSIS 1+ 02/06/2018 03:39 PM     Lab Results   Component Value Date/Time    PROTHROMBTM 31.5 (H) 03/24/2018 04:17 AM    INR 3.08 (H) 03/24/2018 04:17 AM

## 2018-03-24 NOTE — PROGRESS NOTES
Assumed care of Pt at 1508 when Pt transferred to T215 via [facility], assessment complete.  Pt resting/sleeping comfortably; A & O X 4, VSS, nsr; Pt denies pain, discomfort at this time; updated on and understands POC; oriented to call light and unit routine.  Pt given juice, medicated per MAR; denies other needs, concerns at this time; bed in low position, call light within reach - will continue to monitor.

## 2018-03-24 NOTE — PROGRESS NOTES
Inpatient Anticoagulation Service Note    Date: 3/24/2018  Reason for Anticoagulation: Atrial Fibrillation   [id number] VASc Score: 3    Hemoglobin Value: (!) 8.7  Hematocrit Value: (!) 33.3  Lab Platelet Value: 353  Target INR: 2.0 to 3.0    INR from last 7 days     Date/Time INR Value    03/24/18 0417 (!)  3.08    03/23/18 1105 (!)  2.48        Dose from last 7 days     Date/Time Dose (mg)    03/24/18 0700  5    03/23/18 1400  10        Average Dose (mg):  (home dose: 5 mg Mon, 10 mg AOD)  Significant Interactions: Amiodarone, Aspirin, Statin, Antibiotics, Corticosteroids  Bridge Therapy: No     Comments: Steroids and antibiotics added. INR now supratherapeutic. Patient without S/S overt bleeding noted. Will reduce dose x 1 today, INR tomorrow if patient still here. Will follow.     Plan:  5 mg tonight, INR tomorrow  Education Material Provided?: No (chronic warfarin patient)  Pharmacist suggested discharge dosing: likely home dose of 5 mg Mondays, 10 mg all other days     Jazmin Melgar, PHARMD

## 2018-03-25 PROCEDURE — 665999 HH PPS REVENUE DEBIT

## 2018-03-25 PROCEDURE — 665998 HH PPS REVENUE CREDIT

## 2018-03-26 ENCOUNTER — ANTICOAGULATION MONITORING (OUTPATIENT)
Dept: VASCULAR LAB | Facility: MEDICAL CENTER | Age: 53
End: 2018-03-26

## 2018-03-26 DIAGNOSIS — I48.91 ATRIAL FIBRILLATION, UNSPECIFIED TYPE (HCC): ICD-10-CM

## 2018-03-26 NOTE — PROGRESS NOTES
Anticoagulation Summary  As of 3/26/2018    INR goal:   2.0-3.0   TTR:   59.0 % (1.1 mo)   Today's INR:   3.08! (3/24/2018)   Maintenance plan:   5 mg (5 mg x 1) on Mon; 10 mg (5 mg x 2) all other days   Weekly total:   65 mg   No change documented:   Suzy MORALES'Raytano, Med Ass't   Plan last modified:   Rafi Chaudhary PharmD (3/19/2018)   Next INR check:   4/9/2018   Target end date:   Indefinite    Indications    Atrial fibrillation (CMS-HCC) [I48.91]  Atrial flutter with rapid ventricular response (CMS-HCC) (Resolved) [I48.92]             Anticoagulation Episode Summary     INR check location:       Preferred lab:       Send INR reminders to:       Comments:   [facility]      Anticoagulation Care Providers     Provider Role Specialty Phone number    Radhadairitika LISSA Bedoya Referring Cardiology [phone]    [facility] Anticoagulation Services Responsible  [phone]        Anticoagulation Patient Findings  Patient Findings     Negatives:   Signs/symptoms of thrombosis, Signs/symptoms of bleeding, Laboratory test error suspected, Change in health, Change in alcohol use, Change in activity, Upcoming invasive procedure, Emergency department visit, Upcoming dental procedure, Missed doses, Extra doses, Change in medications, Change in diet/appetite, Hospital admission, Bruising, Other complaints        Spoke with patient to report a therapeutic INR.  Pt instructed to continue with current warfarin dosing regimen. Pt denies any s/s of bleeding, bruising, clotting or any changes to diet or medication.  Will follow up in 2 weeks. Spoke with Devin Santana, [place] regarding patient.    Suzy Long, Med Ass't    Cosignature:    Devin Santana, ZakD

## 2018-03-27 ENCOUNTER — PATIENT OUTREACH (OUTPATIENT)
Dept: HEALTH INFORMATION MANAGEMENT | Facility: OTHER | Age: 53
End: 2018-03-27

## 2018-03-30 DIAGNOSIS — J98.4 MIXED RESTRICTIVE AND OBSTRUCTIVE LUNG DISEASE (HCC): ICD-10-CM

## 2018-03-30 DIAGNOSIS — J43.9 MIXED RESTRICTIVE AND OBSTRUCTIVE LUNG DISEASE (HCC): ICD-10-CM

## 2018-04-06 ENCOUNTER — OFFICE VISIT (OUTPATIENT)
Dept: CARDIOLOGY | Facility: MEDICAL CENTER | Age: 53
End: 2018-04-06
Payer: MEDICARE

## 2018-04-06 VITALS
BODY MASS INDEX: 36.96 KG/M2 | OXYGEN SATURATION: 96 % | DIASTOLIC BLOOD PRESSURE: 60 MMHG | HEIGHT: 77 IN | HEART RATE: 70 BPM | WEIGHT: 313 LBS | SYSTOLIC BLOOD PRESSURE: 102 MMHG

## 2018-04-06 DIAGNOSIS — R06.09 DYSPNEA ON EXERTION: ICD-10-CM

## 2018-04-06 DIAGNOSIS — I25.5 ISCHEMIC CARDIOMYOPATHY: ICD-10-CM

## 2018-04-06 DIAGNOSIS — E78.5 DYSLIPIDEMIA: ICD-10-CM

## 2018-04-06 DIAGNOSIS — I50.20 ACC/AHA STAGE C SYSTOLIC HEART FAILURE (HCC): ICD-10-CM

## 2018-04-06 DIAGNOSIS — F17.200 SMOKING: ICD-10-CM

## 2018-04-06 DIAGNOSIS — I50.9 HEART FAILURE, NYHA CLASS 2 (HCC): ICD-10-CM

## 2018-04-06 DIAGNOSIS — I25.10 CORONARY ARTERY DISEASE INVOLVING NATIVE CORONARY ARTERY OF NATIVE HEART WITHOUT ANGINA PECTORIS: ICD-10-CM

## 2018-04-06 DIAGNOSIS — Z79.899 HIGH RISK MEDICATION USE: ICD-10-CM

## 2018-04-06 PROCEDURE — 99406 BEHAV CHNG SMOKING 3-10 MIN: CPT | Performed by: INTERNAL MEDICINE

## 2018-04-06 PROCEDURE — 99215 OFFICE O/P EST HI 40 MIN: CPT | Mod: 25 | Performed by: INTERNAL MEDICINE

## 2018-04-06 RX ORDER — FUROSEMIDE 20 MG/1
20 TABLET ORAL DAILY
Qty: 30 TAB | Refills: 11 | Status: ON HOLD | OUTPATIENT
Start: 2018-04-06 | End: 2018-07-27

## 2018-04-06 RX ORDER — CARVEDILOL 6.25 MG/1
6.25 TABLET ORAL 2 TIMES DAILY WITH MEALS
Qty: 60 TAB | Refills: 11 | Status: ON HOLD | OUTPATIENT
Start: 2018-04-06 | End: 2018-07-27

## 2018-04-06 ASSESSMENT — ENCOUNTER SYMPTOMS
PALPITATIONS: 0
DIZZINESS: 0
MYALGIAS: 0
MEMORY LOSS: 0
BRUISES/BLEEDS EASILY: 0
HEADACHES: 0
FALLS: 0
DEPRESSION: 0
SHORTNESS OF BREATH: 1
DOUBLE VISION: 0
BLURRED VISION: 0
ABDOMINAL PAIN: 0
DIAPHORESIS: 0
COUGH: 0
SENSORY CHANGE: 0
FEVER: 0

## 2018-04-06 NOTE — PROGRESS NOTES
Chief Complaint   Patient presents with   • CHF (Systolic)       Subjective:   Joshua Medrano is a 52 y.o. male who presents today for cardiac care and evaluation and a heart failure clinic after recent hospital stay for heart failure exacerbation. Patient does have a history of ischemic cardiomyopathy for which he underwent CABG x 2 (LIMA to LAD and SVG to OM) in December 2017. His left ventricular systolic function was found to be 25%.     Patient still gets winded with daily living activities and exertion. No symptoms at rest.     Patient was able to complete 146 m during his 6 minute walk test. his O2 saturation at baseline was 94% and at the end of the test, the O2 saturation was 99%. he reported 2 level of dyspnea on Elsa scale.    Past Medical History:   Diagnosis Date   • ASTHMA    • Atrial fibrillation (CMS-[facility])    • CAD (coronary artery disease)    • Chronic obstructive pulmonary disease (CMS-[facility])    • Diabetes      Past Surgical History:   Procedure Laterality Date   • THORACOSCOPY Left 1/25/2018    Procedure: THORACOSCOPY- PLEURODESIS ;  Surgeon: Chandu Paez M.D.;  Location: SURGERY [facility];  Service: General   • MULTIPLE CORONARY ARTERY BYPASS ENDO VEIN HARVEST  12/22/2017    Procedure: MULTIPLE CORONARY ARTERY BYPASS ENDO VEIN HARVEST X2;  Surgeon: Kiel Ash M.D.;  Location: SURGERY [facility];  Service: Cardiothoracic   • JIM  12/22/2017    Procedure: JIM;  Surgeon: Kiel Ash M.D.;  Location: SURGERY [facility];  Service: Cardiothoracic   • OTHER ABDOMINAL SURGERY       Family History   Problem Relation Age of Onset   • Diabetes Mother    • Stroke Mother    • Leukemia Mother    • Diabetes Father    • No Known Problems Sister    • Heart Disease Brother      PPM   • Lung Disease Brother    • Alcohol/Drug Brother    • Diabetes Sister      Social History     Social History   • Marital status:      Spouse name: N/A   • Number of children: N/A   • Years of  education: N/A     Occupational History   • Not on file.     Social History Main Topics   • Smoking status: Former Smoker     Packs/day: 0.50     Years: 30.00     Types: Cigarettes     Quit date: 12/13/2017   • Smokeless tobacco: Never Used      Comment: 1/2-1.5 packs for 30 years   • Alcohol use No   • Drug use: No   • Sexual activity: Not on file     Other Topics Concern   • Not on file     Social History Narrative   • No narrative on file     Allergies   Allergen Reactions   • Erythromycin Anaphylaxis     Rxn - 1994   • Cillins [Penicillins] Rash     As a child, tolerated cefepime (12/2017), ceftriaxone (1/2018), cefazolin (12/2017)   • Metoprolol      Pt stated got latham and aggressive    • Shellfish Allergy Anaphylaxis     Pt stated that he is allergic to all seafood, will develop a rash and says that rash will clear up with benadryl     Outpatient Encounter Prescriptions as of 4/6/2018   Medication Sig Dispense Refill   • carvedilol (COREG) 6.25 MG Tab Take 1 Tab by mouth 2 times a day, with meals. 60 Tab 11   • furosemide (LASIX) 20 MG Tab Take 1 Tab by mouth every day. 30 Tab 11   • lisinopril (PRINIVIL) 10 MG Tab Take 0.5 Tabs by mouth every evening. 30 Tab 1   • warfarin (COUMADIN) 5 MG Tab Take 5-10 mg by mouth every day. 5 mg on Mondays  10 mg all other days     • amiodarone (CORDARONE) 200 MG Tab Take 1 Tab by mouth every day. 90 Tab 3   • digoxin (LANOXIN) 125 MCG Tab Take 1 Tab by mouth every day at 6 PM. 90 Tab 3   • rosuvastatin (CRESTOR) 40 MG tablet Take 1 Tab by mouth every evening. 90 Tab 3   • spironolactone (ALDACTONE) 50 MG Tab Take 1 Tab by mouth every day. 90 Tab 3   • aspirin (ASA) 81 MG Chew Tab chewable tablet Take 1 Tab by mouth every day. 90 Tab 3   • ipratropium-albuterol (DUONEB) 0.5-2.5 (3) MG/3ML nebulizer solution 3 mL by Nebulization route every four hours as needed for Shortness of Breath. 30 Bullet 0   • albuterol 108 (90 Base) MCG/ACT Aero Soln inhalation aerosol Inhale 2  "Puffs by mouth every 6 hours as needed for Shortness of Breath. 1 Inhaler 10   • predniSONE (DELTASONE) 20 MG Tab Take 1.5 Tabs by mouth every day. 6 Tab 0   • [DISCONTINUED] doxycycline monohydrate (ADOXA) 100 MG tablet Take 1 Tab by mouth every 12 hours. 13 Tab 0   • [DISCONTINUED] ferrous sulfate 325 (65 Fe) MG tablet Take 1 Tab by mouth every morning with breakfast. 30 Tab 3   • [DISCONTINUED] guaiFENesin dextromethorphan (ROBITUSSIN DM) 100-10 MG/5ML Syrup syrup Take 10 mL by mouth every 6 hours as needed for Cough. 840 mL 0   • [DISCONTINUED] furosemide (LASIX) 20 MG Tab Take 20 mg by mouth 2 times a day.     • [DISCONTINUED] carvedilol (COREG) 3.125 MG Tab Take 1 Tab by mouth 2 times a day, with meals. 60 Tab 11     No facility-administered encounter medications on file as of 4/6/2018.      Review of Systems   Constitutional: Negative for diaphoresis and fever.   HENT: Negative for nosebleeds.    Eyes: Negative for blurred vision and double vision.   Respiratory: Positive for shortness of breath. Negative for cough.    Cardiovascular: Negative for chest pain and palpitations.   Gastrointestinal: Negative for abdominal pain.   Genitourinary: Negative for dysuria and frequency.   Musculoskeletal: Negative for falls and myalgias.   Skin: Negative for rash.   Neurological: Negative for dizziness, sensory change and headaches.   Endo/Heme/Allergies: Does not bruise/bleed easily.   Psychiatric/Behavioral: Negative for depression and memory loss.        Objective:   /60   Pulse 70   Ht 1.956 m (6' 5\")   Wt (!) 142 kg (313 lb)   SpO2 96%   BMI 37.12 kg/m²     Physical Exam   Constitutional: He is oriented to person, place, and time. No distress.   HENT:   Head: Normocephalic and atraumatic.   Right Ear: External ear normal.   Left Ear: External ear normal.   Eyes: Right eye exhibits no discharge. Left eye exhibits no discharge.   Neck: No JVD present. No thyromegaly present.   Cardiovascular: Normal rate, " regular rhythm, normal heart sounds and intact distal pulses.  Exam reveals no gallop and no friction rub.    No murmur heard.  Pulmonary/Chest: Breath sounds normal. No respiratory distress.   Abdominal: Bowel sounds are normal. He exhibits no distension. There is no tenderness.   Musculoskeletal: He exhibits no edema or tenderness.   Neurological: He is alert and oriented to person, place, and time. No cranial nerve deficit.   Skin: Skin is warm and dry. He is not diaphoretic.   Psychiatric: He has a normal mood and affect. His behavior is normal.   Nursing note and vitals reviewed.      Assessment:     1. ACC/AHA stage C systolic heart failure (CMS-[facility])  carvedilol (COREG) 6.25 MG Tab    furosemide (LASIX) 20 MG Tab   2. Heart failure, NYHA class 2 (CMS-[facility])  carvedilol (COREG) 6.25 MG Tab    furosemide (LASIX) 20 MG Tab   3. Ischemic cardiomyopathy  carvedilol (COREG) 6.25 MG Tab   4. Coronary artery disease involving native coronary artery of native heart without angina pectoris     5. Dyslipidemia     6. High risk medication use     7. Dyspnea on exertion     8. Smoking         Medical Decision Making:  Today's Assessment / Status / Plan:   Today, based on physical examination findings, patient is euvolemic. No JVD, lungs are clear to auscultation, no pitting edema in bilateral lower extremities, no ascites.    Dry weight is 313 lbs.    Will increase Carvedilol to 6.25 mg po twice daily.    Change lasix to 20 mg once a day.    Continue Lisinopril at 5 mg po daily and Spironolactone 50 mg po daily.    Still smokes.    I spent 5 minutes talking to patient about the danger of smoking. I advised patient and counseled patient on smoking cessation. Patient has promised to achieve goal of zero cigarettes per day.    Will continue to closely monitor for side effects of patient's high risk medication(s) including liver, renal function and electrolytes.    I will see patient back in our Heart Failure Clinic with lab  tests and studies results in 3 weeks.    I thank you Dr. Le for referring patient to our Heart Failure Clinic today.

## 2018-04-09 ENCOUNTER — ANTICOAGULATION VISIT (OUTPATIENT)
Dept: VASCULAR LAB | Facility: MEDICAL CENTER | Age: 53
End: 2018-04-09
Attending: INTERNAL MEDICINE
Payer: MEDICARE

## 2018-04-09 VITALS — SYSTOLIC BLOOD PRESSURE: 104 MMHG | HEART RATE: 70 BPM | DIASTOLIC BLOOD PRESSURE: 51 MMHG

## 2018-04-09 DIAGNOSIS — I48.91 ATRIAL FIBRILLATION, UNSPECIFIED TYPE (HCC): ICD-10-CM

## 2018-04-09 LAB — INR PPP: 3.5 (ref 2–3.5)

## 2018-04-09 PROCEDURE — 99212 OFFICE O/P EST SF 10 MIN: CPT | Performed by: NURSE PRACTITIONER

## 2018-04-09 PROCEDURE — 85610 PROTHROMBIN TIME: CPT

## 2018-04-09 NOTE — PROGRESS NOTES
Anticoagulation Summary  As of 4/9/2018    INR goal:   2.0-3.0   TTR:   40.0 % (1.6 mo)   Today's INR:   3.5!   Maintenance plan:   5 mg (5 mg x 1) on Mon, Wed; 10 mg (5 mg x 2) all other days   Weekly total:   60 mg   Plan last modified:   KAMALA PowellPMAMADOU (4/9/2018)   Next INR check:   4/19/2018   Target end date:   Indefinite    Indications    Atrial fibrillation (CMS-HCC) [I48.91]  Atrial flutter with rapid ventricular response (CMS-HCC) (Resolved) [I48.92]             Anticoagulation Episode Summary     INR check location:       Preferred lab:       Send INR reminders to:       Comments:   [facility]      Anticoagulation Care Providers     Provider Role Specialty Phone number    Jett Bedoya M.D. Referring Cardiology [phone]    [facility] Responsible  [phone]        Anticoagulation Patient Findings      HPI:  Joshua Medrano seen in clinic today for follow up on anticoagulation therapy in the presence of AF. Denies any changes to current medical/health status since last appointment. Lasix decreased, carvedilol increase since last appt. Denies any diet changes. No current symptoms of bleeding or thrombosis reported.    A/P:   INR supratherapeutic. INR trending high. Will decrease regimen. BP recorded in vitals.    Follow up appointment in 1 week(s).    Next Appointment: Thursday, April 19, 2018 at 2:30 pm.     Taylor SHELTON

## 2018-04-10 LAB — INR BLD: 3.5 (ref 0.9–1.2)

## 2018-04-19 ENCOUNTER — ANTICOAGULATION VISIT (OUTPATIENT)
Dept: VASCULAR LAB | Facility: MEDICAL CENTER | Age: 53
End: 2018-04-19
Attending: INTERNAL MEDICINE
Payer: MEDICARE

## 2018-04-19 ENCOUNTER — OFFICE VISIT (OUTPATIENT)
Dept: INTERNAL MEDICINE | Facility: MEDICAL CENTER | Age: 53
End: 2018-04-19
Payer: MEDICARE

## 2018-04-19 VITALS
SYSTOLIC BLOOD PRESSURE: 119 MMHG | HEIGHT: 77 IN | WEIGHT: 315 LBS | HEART RATE: 73 BPM | DIASTOLIC BLOOD PRESSURE: 71 MMHG | TEMPERATURE: 97.8 F | BODY MASS INDEX: 37.19 KG/M2 | OXYGEN SATURATION: 92 %

## 2018-04-19 DIAGNOSIS — J44.9 CHRONIC OBSTRUCTIVE PULMONARY DISEASE, UNSPECIFIED COPD TYPE (HCC): ICD-10-CM

## 2018-04-19 DIAGNOSIS — J45.20 MILD INTERMITTENT ASTHMA WITHOUT COMPLICATION: ICD-10-CM

## 2018-04-19 DIAGNOSIS — D64.9 ANEMIA, UNSPECIFIED TYPE: ICD-10-CM

## 2018-04-19 DIAGNOSIS — I48.91 ATRIAL FIBRILLATION, UNSPECIFIED TYPE (HCC): ICD-10-CM

## 2018-04-19 DIAGNOSIS — R79.89 ELEVATED SERUM CREATININE: ICD-10-CM

## 2018-04-19 LAB
INR BLD: 2.7 (ref 0.9–1.2)
INR PPP: 2.7 (ref 2–3.5)

## 2018-04-19 PROCEDURE — 99213 OFFICE O/P EST LOW 20 MIN: CPT | Mod: GE | Performed by: INTERNAL MEDICINE

## 2018-04-19 PROCEDURE — 99211 OFF/OP EST MAY X REQ PHY/QHP: CPT | Performed by: NURSE PRACTITIONER

## 2018-04-19 PROCEDURE — 85610 PROTHROMBIN TIME: CPT

## 2018-04-19 RX ORDER — FERROUS SULFATE 325(65) MG
325 TABLET ORAL DAILY
Qty: 30 TAB | Refills: 6 | Status: SHIPPED | OUTPATIENT
Start: 2018-04-19 | End: 2018-07-24

## 2018-04-19 RX ORDER — BUDESONIDE AND FORMOTEROL FUMARATE DIHYDRATE 80; 4.5 UG/1; UG/1
2 AEROSOL RESPIRATORY (INHALATION) 2 TIMES DAILY
Qty: 2 INHALER | Refills: 6 | Status: SHIPPED | OUTPATIENT
Start: 2018-04-19 | End: 2018-10-07

## 2018-04-19 RX ORDER — IPRATROPIUM BROMIDE AND ALBUTEROL SULFATE 2.5; .5 MG/3ML; MG/3ML
3 SOLUTION RESPIRATORY (INHALATION) EVERY 6 HOURS PRN
Qty: 60 BULLET | Refills: 6 | Status: ON HOLD | OUTPATIENT
Start: 2018-04-19 | End: 2018-10-31

## 2018-04-19 NOTE — PROGRESS NOTES
Established Patient    Joshua presents today with the following:    CC: follow up of COPD, asthma, and anemia    HPI:   53 y/o male with complex medical history of COPD, asthma, Diabetes (on no meds), CAD with 2 vessel CABG, A-fib, DLD, anemia presents for follow up of COPD, asthma, and anemia.     Recent PFTs were non contributory with questionable interstitial lung disease with mixed restrictive and obstructive lung disease pattern. Patient mentions having wheezing with dust and wind and gives h/o asthma since age 9. He has required rare use of his albuterol recently with no night use along with scheduled duoneb use.     Discussed importance of pneumococcal vaccination with his chronic medical conditions, but patient and spouse adamantly refuse vaccines saying people tend to get more sick after and possibly due to the vaccine. He has refused flu vaccines in the past as well.     Discussed patients anemia again. Discussed in detail the possibility of colon cancer and need for colonoscopy screening. Also discussed his concurrent use of Warfarin and risks associated with that if he is bleeding from the GI tract. Patient continues to decline colonoscopy.     Patient Active Problem List    Diagnosis Date Noted   • Lower respiratory infection (e.g., bronchitis, pneumonia, pneumonitis, pulmonitis) 03/23/2018     Priority: High   • COPD (chronic obstructive pulmonary disease) (CMS-HCC) 11/19/2017     Priority: High   • Atrial fibrillation (CMS-HCC) 02/06/2018     Priority: Medium   • Acute on chronic systolic CHF (congestive heart failure) (CMS-HCC) 02/06/2018     Priority: Medium   • Asthma 02/12/2018   • Hypertension 02/12/2018   • Noncompliance with medication regimen 02/07/2018   • Anemia 02/06/2018   • Tracheostomy care (CMS-HCC) 02/06/2018   • Leukocytosis 01/24/2018   • CAD (coronary artery disease) 12/22/2017   • CHF (congestive heart failure) (CMS-HCC) 12/19/2017   • Chronic systolic CHF (congestive heart  "failure), NYHA class 4 (CMS-HCC) 11/23/2017   • Type 2 diabetes mellitus (CMS-HCC) 11/20/2017   • NSTEMI (non-ST elevated myocardial infarction) (CMS-HCC) 11/19/2017   • Hyponatremia 04/30/2017   • LINSEY (acute kidney injury) (CMS-HCC) 04/30/2017       Current Outpatient Prescriptions   Medication Sig Dispense Refill   • ipratropium-albuterol (DUONEB) 0.5-2.5 (3) MG/3ML nebulizer solution 3 mL by Nebulization route every 6 hours as needed for Shortness of Breath. 60 Bullet 6   • budesonide-formoterol (SYMBICORT) 80-4.5 MCG/ACT Aerosol Inhale 2 Puffs by mouth 2 Times a Day. 2 Inhaler 6   • ferrous sulfate 325 (65 Fe) MG tablet Take 1 Tab by mouth every day. 30 Tab 6   • carvedilol (COREG) 6.25 MG Tab Take 1 Tab by mouth 2 times a day, with meals. 60 Tab 11   • furosemide (LASIX) 20 MG Tab Take 1 Tab by mouth every day. 30 Tab 11   • lisinopril (PRINIVIL) 10 MG Tab Take 0.5 Tabs by mouth every evening. 30 Tab 1   • warfarin (COUMADIN) 5 MG Tab Take 5-10 mg by mouth every day. 5 mg on Mondays  10 mg all other days     • amiodarone (CORDARONE) 200 MG Tab Take 1 Tab by mouth every day. 90 Tab 3   • digoxin (LANOXIN) 125 MCG Tab Take 1 Tab by mouth every day at 6 PM. 90 Tab 3   • rosuvastatin (CRESTOR) 40 MG tablet Take 1 Tab by mouth every evening. 90 Tab 3   • spironolactone (ALDACTONE) 50 MG Tab Take 1 Tab by mouth every day. 90 Tab 3   • aspirin (ASA) 81 MG Chew Tab chewable tablet Take 1 Tab by mouth every day. 90 Tab 3   • albuterol 108 (90 Base) MCG/ACT Aero Soln inhalation aerosol Inhale 2 Puffs by mouth every 6 hours as needed for Shortness of Breath. 1 Inhaler 10     No current facility-administered medications for this visit.        ROS: As per HPI. Additional pertinent symptoms as noted below.    All others negative    /71   Pulse 73   Temp 36.6 °C (97.8 °F)   Ht 1.956 m (6' 5\")   Wt (!) 142.9 kg (315 lb)   SpO2 92%   BMI 37.35 kg/m²     Physical Exam   Constitutional:  oriented to person, place, and " time. No distress.   Eyes: Pupils are equal, round, and reactive to light. No scleral icterus.  Neck: Neck supple. No thyromegaly present.   Cardiovascular: Normal rate, regular rhythm and normal heart sounds.  Exam reveals no gallop and no friction rub.  No murmur heard.  Pulmonary/Chest: Breath sounds distant.   Musculoskeletal:   3+ edema up to knees, possibly higher, patient unable to lift pants  Lymphadenopathy: no cervical adenopathy  Neurological: alert and oriented to person, place, and time.   Skin: No cyanosis. Nails show no clubbing.      Note: I have reviewed all pertinent labs and diagnostic tests associated with this visit with specific comments listed under the assessment and plan below    Assessment and Plan    1. Mild intermittent asthma without complication + 2.COPD  Patient feels his respiratory status is much improved overall but does have wheezing with dust and wind. He use oxygen by nasal cannula PRN and feels he has been using it much less, a few times daily and sometimes at night. He gives h/o asthma since age 9. He has required rare use of his albuterol recently with no night use along with scheduled duonebs.    Recent PFTs (3/14/18) were non contributory with questionable interstitial lung disease with mixed restrictive and obstructive lung disease pattern.      Discussed importance of pneumococcal vaccination with his chronic medical conditions, but patient and spouse adamantly refuse vaccines saying people tend to get more sick after and possibly due to the vaccine.   Addition of scheduled low dose symbicort and referral to pulmonology. Continue duonebs and PRN albuterol.     3. Anemia, unspecified type  Discussed patients iron deficiency anemia again. Discussed in detail the possibility of colon cancer and need for colonoscopy screening. Also discussed his concurrent use of Warfarin and risks associated with that if he is bleeding from the GI tract. Patient continues to decline  colonoscopy.     Repeat CBC ordered to monitor.     4. Elevated Serum Creatinine  Patient has had an elevation of his baseline creatinine likely due to addition of diuretics. His baseline is very variable and difficult to determine, likely around 0.8-1.2. Most recent creatinine of 1.41 on 3/24/18.     Repeat BMP ordered to monitor creatinine.       Followup: Return in about 6 months (around 10/19/2018).      Signed by: Nam Le M.D.

## 2018-04-19 NOTE — PROGRESS NOTES
Anticoagulation Summary  As of 4/19/2018    INR goal:   2.0-3.0   TTR:   39.5 % (2 mo)   Today's INR:   2.7   Maintenance plan:   5 mg (5 mg x 1) on Mon, Wed; 10 mg (5 mg x 2) all other days   Weekly total:   60 mg   Plan last modified:   KAMALA PowellPMAMADOU (4/9/2018)   Next INR check:   4/27/2018   Target end date:   Indefinite    Indications    Atrial fibrillation (CMS-HCC) [I48.91]  Atrial flutter with rapid ventricular response (CMS-HCC) (Resolved) [I48.92]             Anticoagulation Episode Summary     INR check location:       Preferred lab:       Send INR reminders to:       Comments:   [facility]      Anticoagulation Care Providers     Provider Role Specialty Phone number    Jett Bedoya M.D. Referring Cardiology [phone]    [facility] Responsible  [phone]        Anticoagulation Patient Findings      HPI:  Joshua Medrano seen in clinic today for follow up on anticoagulation therapy in the presence of AF. Denies any changes to current medical/health status since last appointment. Denies any medication or diet changes. No current symptoms of bleeding or thrombosis reported.    A/P:   INR therapeutic. Continue current regimen.    Follow up appointment in 1 week(s).    Next Appointment: Friday , April 27, 2018 at 11:45 pm.     Taylor SHELTON

## 2018-05-07 NOTE — PROGRESS NOTES
"Heart Failure New Appointment     HF RN unavailable, to be seen at next visit.    6MWT- 146.3 meters, walked for 3:53 minutes-stopped d/t SOB.  MLWHF- 37    OP Heart Failure  Vitals  Appointment Type: Heart Failure New  Weight: (!) 131.1 kg (289 lb)  Weight Source: Stand Up Scale  Height: 195.6 cm (6' 5\")  BMI (Calculated): 34.27  Blood Pressure: 120/48  Pulse: 70    System Assessment  NYHA Functional Class Assessment: Class III  ACC/AHA HF Stage: C    Smoking Hx  Have you Ever Smoked: Yes  Have you Smoked in the Last 12 Mos: Yes  Have you Quit Smoking: Yes  Confirm Quit Date: 17     Alcohol Hx  Do you Drink?: No     Illicit Drug Hx  Illicit Drug History: No    MN Living with Heart Failure  Swelling in Ankles or Legs: 0  Having to Sit or Lie Down During the Day: 3  Walking About or Climbing Stairs Difficult: 5  Working Around the House or Yard Difficult: 0  Difficulty Going Away from Home: 1  Difficulty Sleeping Well at Night: 5  Difficulty Socializing with Family or Friends: 0  Difficulty Working to Earn a Livin  Difficulty with Recreational Pastimes, Sports, Hobbies: 0  Difficulty with Sexual Activities: 5  Eating Less Foods You Like: 4  Making you Short of Breath: 2  Tired, Fatigued or Low on Energy: 2  Making you Stay in a Hospital: 4  Costing you Money for Medical Care?: 3  Giving you Side Effects from Treatments: 0  Feeling like a Xenia to Family and Friends: 0  Loss of Self Control in your Life: 0  Making You Worry: 3  Difficulty to Concentrate or Remembering Things: 0  Making you Feel Depressed: 0  MLF Total Score : 37    6 Minute Walk Test  Baseline to end of test: 3:53  Total meters walked: 146.3  Comments: stopped d/t SOB        Yeni HF RN  [phone]  "

## 2018-05-09 ENCOUNTER — TELEPHONE (OUTPATIENT)
Dept: VASCULAR LAB | Facility: MEDICAL CENTER | Age: 53
End: 2018-05-09

## 2018-06-18 ENCOUNTER — TELEPHONE (OUTPATIENT)
Dept: VASCULAR LAB | Facility: MEDICAL CENTER | Age: 53
End: 2018-06-18

## 2018-07-24 ENCOUNTER — APPOINTMENT (OUTPATIENT)
Dept: RADIOLOGY | Facility: MEDICAL CENTER | Age: 53
DRG: 813 | End: 2018-07-24
Attending: EMERGENCY MEDICINE
Payer: MEDICARE

## 2018-07-24 ENCOUNTER — APPOINTMENT (OUTPATIENT)
Dept: RADIOLOGY | Facility: MEDICAL CENTER | Age: 53
DRG: 813 | End: 2018-07-24
Attending: INTERNAL MEDICINE
Payer: MEDICARE

## 2018-07-24 ENCOUNTER — HOSPITAL ENCOUNTER (INPATIENT)
Facility: MEDICAL CENTER | Age: 53
LOS: 3 days | DRG: 813 | End: 2018-07-27
Attending: EMERGENCY MEDICINE | Admitting: INTERNAL MEDICINE
Payer: MEDICARE

## 2018-07-24 DIAGNOSIS — K92.1 HEMATOCHEZIA: ICD-10-CM

## 2018-07-24 PROBLEM — E11.8 TYPE 2 DIABETES MELLITUS WITH COMPLICATION, WITHOUT LONG-TERM CURRENT USE OF INSULIN (HCC): Status: ACTIVE | Noted: 2017-11-20

## 2018-07-24 PROBLEM — R57.8 HEMORRHAGIC SHOCK (HCC): Status: ACTIVE | Noted: 2018-07-24

## 2018-07-24 PROBLEM — Z86.73 HISTORY OF STROKE: Status: ACTIVE | Noted: 2017-11-19

## 2018-07-24 PROBLEM — Z86.73 HISTORY OF STROKE: Status: RESOLVED | Noted: 2017-11-19 | Resolved: 2017-11-19

## 2018-07-24 PROBLEM — E66.01 CLASS 2 SEVERE OBESITY DUE TO EXCESS CALORIES WITH SERIOUS COMORBIDITY AND BODY MASS INDEX (BMI) OF 36.0 TO 36.9 IN ADULT (HCC): Status: ACTIVE | Noted: 2018-07-24

## 2018-07-24 PROBLEM — K92.2 ACUTE LOWER GI BLEEDING: Status: ACTIVE | Noted: 2018-07-24

## 2018-07-24 PROBLEM — F17.200 NICOTINE DEPENDENCE: Status: ACTIVE | Noted: 2018-07-24

## 2018-07-24 PROBLEM — G47.33 OSA (OBSTRUCTIVE SLEEP APNEA): Status: ACTIVE | Noted: 2018-07-24

## 2018-07-24 PROBLEM — F17.219 CIGARETTE NICOTINE DEPENDENCE WITH NICOTINE-INDUCED DISORDER: Status: ACTIVE | Noted: 2018-07-24

## 2018-07-24 PROBLEM — I48.0 PAROXYSMAL ATRIAL FIBRILLATION (HCC): Status: ACTIVE | Noted: 2018-02-06

## 2018-07-24 PROBLEM — E78.5 DYSLIPIDEMIA: Status: ACTIVE | Noted: 2018-07-24

## 2018-07-24 PROBLEM — E66.9 OBESITY: Status: ACTIVE | Noted: 2018-07-24

## 2018-07-24 PROBLEM — N18.2 CKD (CHRONIC KIDNEY DISEASE), STAGE II: Status: ACTIVE | Noted: 2018-07-24

## 2018-07-24 PROBLEM — R00.1 BRADYCARDIA: Status: ACTIVE | Noted: 2018-07-24

## 2018-07-24 LAB
ABO GROUP BLD: NORMAL
ALBUMIN SERPL BCP-MCNC: 3.4 G/DL (ref 3.2–4.9)
ALBUMIN/GLOB SERPL: 1.1 G/DL
ALP SERPL-CCNC: 77 U/L (ref 30–99)
ALT SERPL-CCNC: 12 U/L (ref 2–50)
ANION GAP SERPL CALC-SCNC: 6 MMOL/L (ref 0–11.9)
ANISOCYTOSIS BLD QL SMEAR: ABNORMAL
APTT PPP: 42.2 SEC (ref 24.7–36)
AST SERPL-CCNC: 12 U/L (ref 12–45)
BARCODED ABORH UBTYP: 6200
BARCODED PRD CODE UBPRD: NORMAL
BARCODED UNIT NUM UBUNT: NORMAL
BASOPHILS # BLD AUTO: 0.9 % (ref 0–1.8)
BASOPHILS # BLD: 0.16 K/UL (ref 0–0.12)
BILIRUB SERPL-MCNC: 0.4 MG/DL (ref 0.1–1.5)
BLD GP AB SCN SERPL QL: NORMAL
BUN SERPL-MCNC: 22 MG/DL (ref 8–22)
CALCIUM SERPL-MCNC: 8.4 MG/DL (ref 8.5–10.5)
CHLORIDE SERPL-SCNC: 110 MMOL/L (ref 96–112)
CO2 SERPL-SCNC: 22 MMOL/L (ref 20–33)
COMMENT 1642: NORMAL
COMPONENT FT 8504FT: NORMAL
COMPONENT R 8504R: NORMAL
CREAT SERPL-MCNC: 1.38 MG/DL (ref 0.5–1.4)
DACRYOCYTES BLD QL SMEAR: NORMAL
EOSINOPHIL # BLD AUTO: 0.84 K/UL (ref 0–0.51)
EOSINOPHIL NFR BLD: 4.7 % (ref 0–6.9)
ERYTHROCYTE [DISTWIDTH] IN BLOOD BY AUTOMATED COUNT: 49.3 FL (ref 35.9–50)
ERYTHROCYTE [DISTWIDTH] IN BLOOD BY AUTOMATED COUNT: 50.1 FL (ref 35.9–50)
FERRITIN SERPL-MCNC: 10 NG/ML (ref 22–322)
FOLATE SERPL-MCNC: 9.6 NG/ML
GLOBULIN SER CALC-MCNC: 3.2 G/DL (ref 1.9–3.5)
GLUCOSE BLD-MCNC: 150 MG/DL (ref 65–99)
GLUCOSE SERPL-MCNC: 127 MG/DL (ref 65–99)
HCT VFR BLD AUTO: 24.5 % (ref 42–52)
HCT VFR BLD AUTO: 26.5 % (ref 42–52)
HCT VFR BLD AUTO: 30.7 % (ref 42–52)
HGB BLD-MCNC: 6.7 G/DL (ref 14–18)
HGB BLD-MCNC: 7.3 G/DL (ref 14–18)
HGB BLD-MCNC: 8.4 G/DL (ref 14–18)
HGB RETIC QN AUTO: 15.9 PG/CELL (ref 29–35)
HYPOCHROMIA BLD QL SMEAR: ABNORMAL
IMM GRANULOCYTES # BLD AUTO: 0.52 K/UL (ref 0–0.11)
IMM GRANULOCYTES NFR BLD AUTO: 2.9 % (ref 0–0.9)
IMM RETICS NFR: 34.8 % (ref 9.3–17.4)
INR PPP: 2.05 (ref 0.87–1.13)
INR PPP: 3.43 (ref 0.87–1.13)
IRON SATN MFR SERPL: 4 % (ref 15–55)
IRON SERPL-MCNC: 13 UG/DL (ref 50–180)
LIPASE SERPL-CCNC: 40 U/L (ref 11–82)
LYMPHOCYTES # BLD AUTO: 1.3 K/UL (ref 1–4.8)
LYMPHOCYTES NFR BLD: 7.2 % (ref 22–41)
MCH RBC QN AUTO: 17.4 PG (ref 27–33)
MCH RBC QN AUTO: 17.5 PG (ref 27–33)
MCHC RBC AUTO-ENTMCNC: 27.3 G/DL (ref 33.7–35.3)
MCHC RBC AUTO-ENTMCNC: 27.4 G/DL (ref 33.7–35.3)
MCV RBC AUTO: 63.7 FL (ref 81.4–97.8)
MCV RBC AUTO: 64 FL (ref 81.4–97.8)
MICROCYTES BLD QL SMEAR: ABNORMAL
MONOCYTES # BLD AUTO: 1.04 K/UL (ref 0–0.85)
MONOCYTES NFR BLD AUTO: 5.8 % (ref 0–13.4)
MORPHOLOGY BLD-IMP: NORMAL
NEUTROPHILS # BLD AUTO: 14.15 K/UL (ref 1.82–7.42)
NEUTROPHILS NFR BLD: 78.5 % (ref 44–72)
NRBC # BLD AUTO: 0 K/UL
NRBC BLD-RTO: 0 /100 WBC
OVALOCYTES BLD QL SMEAR: NORMAL
PLATELET # BLD AUTO: 389 K/UL (ref 164–446)
PLATELET # BLD AUTO: 408 K/UL (ref 164–446)
PLATELET BLD QL SMEAR: NORMAL
PMV BLD AUTO: 10.2 FL (ref 9–12.9)
PMV BLD AUTO: 9.6 FL (ref 9–12.9)
POIKILOCYTOSIS BLD QL SMEAR: NORMAL
POTASSIUM SERPL-SCNC: 4.6 MMOL/L (ref 3.6–5.5)
PRODUCT TYPE UPROD: NORMAL
PROT SERPL-MCNC: 6.6 G/DL (ref 6–8.2)
PROTHROMBIN TIME: 22.8 SEC (ref 12–14.6)
PROTHROMBIN TIME: 34.3 SEC (ref 12–14.6)
RBC # BLD AUTO: 3.83 M/UL (ref 4.7–6.1)
RBC # BLD AUTO: 4.82 M/UL (ref 4.7–6.1)
RBC BLD AUTO: PRESENT
RETICS # AUTO: 0.07 M/UL (ref 0.04–0.06)
RETICS/RBC NFR: 1.7 % (ref 0.8–2.1)
RH BLD: NORMAL
SODIUM SERPL-SCNC: 138 MMOL/L (ref 135–145)
TIBC SERPL-MCNC: 304 UG/DL (ref 250–450)
TSH SERPL DL<=0.005 MIU/L-ACNC: 4.91 UIU/ML (ref 0.38–5.33)
UNIT STATUS USTAT: NORMAL
VIT B12 SERPL-MCNC: 441 PG/ML (ref 211–911)
WBC # BLD AUTO: 18 K/UL (ref 4.8–10.8)
WBC # BLD AUTO: 20.9 K/UL (ref 4.8–10.8)

## 2018-07-24 PROCEDURE — 94760 N-INVAS EAR/PLS OXIMETRY 1: CPT

## 2018-07-24 PROCEDURE — 83690 ASSAY OF LIPASE: CPT

## 2018-07-24 PROCEDURE — 36556 INSERT NON-TUNNEL CV CATH: CPT

## 2018-07-24 PROCEDURE — 80053 COMPREHEN METABOLIC PANEL: CPT

## 2018-07-24 PROCEDURE — 86901 BLOOD TYPING SEROLOGIC RH(D): CPT

## 2018-07-24 PROCEDURE — 82962 GLUCOSE BLOOD TEST: CPT

## 2018-07-24 PROCEDURE — 85610 PROTHROMBIN TIME: CPT | Mod: 91

## 2018-07-24 PROCEDURE — 99285 EMERGENCY DEPT VISIT HI MDM: CPT

## 2018-07-24 PROCEDURE — 36556 INSERT NON-TUNNEL CV CATH: CPT | Performed by: INTERNAL MEDICINE

## 2018-07-24 PROCEDURE — 700105 HCHG RX REV CODE 258: Performed by: EMERGENCY MEDICINE

## 2018-07-24 PROCEDURE — 02HV33Z INSERTION OF INFUSION DEVICE INTO SUPERIOR VENA CAVA, PERCUTANEOUS APPROACH: ICD-10-PCS | Performed by: INTERNAL MEDICINE

## 2018-07-24 PROCEDURE — P9016 RBC LEUKOCYTES REDUCED: HCPCS

## 2018-07-24 PROCEDURE — 82746 ASSAY OF FOLIC ACID SERUM: CPT

## 2018-07-24 PROCEDURE — B548ZZA ULTRASONOGRAPHY OF SUPERIOR VENA CAVA, GUIDANCE: ICD-10-PCS | Performed by: INTERNAL MEDICINE

## 2018-07-24 PROCEDURE — 30233N1 TRANSFUSION OF NONAUTOLOGOUS RED BLOOD CELLS INTO PERIPHERAL VEIN, PERCUTANEOUS APPROACH: ICD-10-PCS | Performed by: EMERGENCY MEDICINE

## 2018-07-24 PROCEDURE — 99291 CRITICAL CARE FIRST HOUR: CPT | Mod: 25 | Performed by: INTERNAL MEDICINE

## 2018-07-24 PROCEDURE — 94640 AIRWAY INHALATION TREATMENT: CPT

## 2018-07-24 PROCEDURE — 83540 ASSAY OF IRON: CPT

## 2018-07-24 PROCEDURE — 86923 COMPATIBILITY TEST ELECTRIC: CPT | Mod: 91

## 2018-07-24 PROCEDURE — 700105 HCHG RX REV CODE 258: Performed by: INTERNAL MEDICINE

## 2018-07-24 PROCEDURE — 85018 HEMOGLOBIN: CPT

## 2018-07-24 PROCEDURE — 36415 COLL VENOUS BLD VENIPUNCTURE: CPT

## 2018-07-24 PROCEDURE — C9113 INJ PANTOPRAZOLE SODIUM, VIA: HCPCS | Performed by: INTERNAL MEDICINE

## 2018-07-24 PROCEDURE — 304561 HCHG STAT O2

## 2018-07-24 PROCEDURE — 96365 THER/PROPH/DIAG IV INF INIT: CPT

## 2018-07-24 PROCEDURE — 82607 VITAMIN B-12: CPT

## 2018-07-24 PROCEDURE — 700101 HCHG RX REV CODE 250: Performed by: INTERNAL MEDICINE

## 2018-07-24 PROCEDURE — 82728 ASSAY OF FERRITIN: CPT

## 2018-07-24 PROCEDURE — 36620 INSERTION CATHETER ARTERY: CPT | Performed by: INTERNAL MEDICINE

## 2018-07-24 PROCEDURE — 36620 INSERTION CATHETER ARTERY: CPT

## 2018-07-24 PROCEDURE — A9270 NON-COVERED ITEM OR SERVICE: HCPCS | Performed by: INTERNAL MEDICINE

## 2018-07-24 PROCEDURE — 85046 RETICYTE/HGB CONCENTRATE: CPT

## 2018-07-24 PROCEDURE — 700111 HCHG RX REV CODE 636 W/ 250 OVERRIDE (IP): Performed by: EMERGENCY MEDICINE

## 2018-07-24 PROCEDURE — 700111 HCHG RX REV CODE 636 W/ 250 OVERRIDE (IP): Performed by: INTERNAL MEDICINE

## 2018-07-24 PROCEDURE — 700111 HCHG RX REV CODE 636 W/ 250 OVERRIDE (IP)

## 2018-07-24 PROCEDURE — 30233R1 TRANSFUSION OF NONAUTOLOGOUS PLATELETS INTO PERIPHERAL VEIN, PERCUTANEOUS APPROACH: ICD-10-PCS | Performed by: INTERNAL MEDICINE

## 2018-07-24 PROCEDURE — 86850 RBC ANTIBODY SCREEN: CPT

## 2018-07-24 PROCEDURE — 96361 HYDRATE IV INFUSION ADD-ON: CPT

## 2018-07-24 PROCEDURE — 700102 HCHG RX REV CODE 250 W/ 637 OVERRIDE(OP): Performed by: INTERNAL MEDICINE

## 2018-07-24 PROCEDURE — 85014 HEMATOCRIT: CPT

## 2018-07-24 PROCEDURE — 71045 X-RAY EXAM CHEST 1 VIEW: CPT

## 2018-07-24 PROCEDURE — 93010 ELECTROCARDIOGRAM REPORT: CPT | Performed by: INTERNAL MEDICINE

## 2018-07-24 PROCEDURE — 85025 COMPLETE CBC W/AUTO DIFF WBC: CPT

## 2018-07-24 PROCEDURE — 93005 ELECTROCARDIOGRAM TRACING: CPT | Performed by: INTERNAL MEDICINE

## 2018-07-24 PROCEDURE — 85027 COMPLETE CBC AUTOMATED: CPT

## 2018-07-24 PROCEDURE — 84443 ASSAY THYROID STIM HORMONE: CPT

## 2018-07-24 PROCEDURE — 770022 HCHG ROOM/CARE - ICU (200)

## 2018-07-24 PROCEDURE — 99291 CRITICAL CARE FIRST HOUR: CPT | Performed by: INTERNAL MEDICINE

## 2018-07-24 PROCEDURE — 36430 TRANSFUSION BLD/BLD COMPNT: CPT

## 2018-07-24 PROCEDURE — 85730 THROMBOPLASTIN TIME PARTIAL: CPT

## 2018-07-24 PROCEDURE — 99292 CRITICAL CARE ADDL 30 MIN: CPT | Mod: 25 | Performed by: INTERNAL MEDICINE

## 2018-07-24 PROCEDURE — P9017 PLASMA 1 DONOR FRZ W/IN 8 HR: HCPCS

## 2018-07-24 PROCEDURE — 86900 BLOOD TYPING SEROLOGIC ABO: CPT

## 2018-07-24 PROCEDURE — C1751 CATH, INF, PER/CENT/MIDLINE: HCPCS

## 2018-07-24 PROCEDURE — 83550 IRON BINDING TEST: CPT

## 2018-07-24 RX ORDER — IPRATROPIUM BROMIDE AND ALBUTEROL SULFATE 2.5; .5 MG/3ML; MG/3ML
3 SOLUTION RESPIRATORY (INHALATION)
Status: DISCONTINUED | OUTPATIENT
Start: 2018-07-24 | End: 2018-07-25

## 2018-07-24 RX ORDER — BUDESONIDE AND FORMOTEROL FUMARATE DIHYDRATE 80; 4.5 UG/1; UG/1
2 AEROSOL RESPIRATORY (INHALATION)
Status: DISCONTINUED | OUTPATIENT
Start: 2018-07-24 | End: 2018-07-27 | Stop reason: HOSPADM

## 2018-07-24 RX ORDER — DOPAMINE HYDROCHLORIDE 160 MG/100ML
0-20 INJECTION, SOLUTION INTRAVENOUS CONTINUOUS
Status: DISCONTINUED | OUTPATIENT
Start: 2018-07-24 | End: 2018-07-26

## 2018-07-24 RX ORDER — LISINOPRIL 10 MG/1
10 TABLET ORAL DAILY
Status: ON HOLD | COMMUNITY
End: 2018-07-27

## 2018-07-24 RX ORDER — SODIUM CHLORIDE 9 MG/ML
INJECTION, SOLUTION INTRAVENOUS
Status: DISCONTINUED
Start: 2018-07-24 | End: 2018-07-24

## 2018-07-24 RX ORDER — DIGOXIN 125 MCG
125 TABLET ORAL DAILY
Status: DISCONTINUED | OUTPATIENT
Start: 2018-07-24 | End: 2018-07-24

## 2018-07-24 RX ORDER — CARVEDILOL 6.25 MG/1
6.25 TABLET ORAL 2 TIMES DAILY WITH MEALS
Status: DISCONTINUED | OUTPATIENT
Start: 2018-07-24 | End: 2018-07-24

## 2018-07-24 RX ORDER — ROSUVASTATIN CALCIUM 20 MG/1
40 TABLET, COATED ORAL EVERY EVENING
Status: DISCONTINUED | OUTPATIENT
Start: 2018-07-24 | End: 2018-07-27 | Stop reason: HOSPADM

## 2018-07-24 RX ORDER — NICOTINE 21 MG/24HR
14 PATCH, TRANSDERMAL 24 HOURS TRANSDERMAL
Status: DISCONTINUED | OUTPATIENT
Start: 2018-07-24 | End: 2018-07-27 | Stop reason: HOSPADM

## 2018-07-24 RX ORDER — ONDANSETRON 4 MG/1
4 TABLET, ORALLY DISINTEGRATING ORAL EVERY 4 HOURS PRN
Status: DISCONTINUED | OUTPATIENT
Start: 2018-07-24 | End: 2018-07-27 | Stop reason: HOSPADM

## 2018-07-24 RX ORDER — ACETAMINOPHEN 325 MG/1
650 TABLET ORAL EVERY 6 HOURS PRN
Status: DISCONTINUED | OUTPATIENT
Start: 2018-07-24 | End: 2018-07-27 | Stop reason: HOSPADM

## 2018-07-24 RX ORDER — ONDANSETRON 2 MG/ML
4 INJECTION INTRAMUSCULAR; INTRAVENOUS EVERY 4 HOURS PRN
Status: DISCONTINUED | OUTPATIENT
Start: 2018-07-24 | End: 2018-07-27 | Stop reason: HOSPADM

## 2018-07-24 RX ORDER — SODIUM CHLORIDE 9 MG/ML
1000 INJECTION, SOLUTION INTRAVENOUS ONCE
Status: COMPLETED | OUTPATIENT
Start: 2018-07-24 | End: 2018-07-24

## 2018-07-24 RX ORDER — AMIODARONE HYDROCHLORIDE 200 MG/1
200 TABLET ORAL DAILY
Status: DISCONTINUED | OUTPATIENT
Start: 2018-07-25 | End: 2018-07-24

## 2018-07-24 RX ORDER — ASPIRIN 81 MG/1
81 TABLET, CHEWABLE ORAL DAILY
Status: DISCONTINUED | OUTPATIENT
Start: 2018-07-25 | End: 2018-07-24

## 2018-07-24 RX ORDER — ALBUTEROL SULFATE 90 UG/1
2 AEROSOL, METERED RESPIRATORY (INHALATION)
Status: DISCONTINUED | OUTPATIENT
Start: 2018-07-24 | End: 2018-07-27 | Stop reason: HOSPADM

## 2018-07-24 RX ORDER — DOPAMINE HYDROCHLORIDE 160 MG/100ML
INJECTION, SOLUTION INTRAVENOUS
Status: COMPLETED
Start: 2018-07-24 | End: 2018-07-24

## 2018-07-24 RX ADMIN — NOREPINEPHRINE BITARTRATE 5 MCG/MIN: 1 INJECTION INTRAVENOUS at 20:45

## 2018-07-24 RX ADMIN — COAGULATION FACTOR VIIA (RECOMBINANT) 1000 MCG: KIT at 21:30

## 2018-07-24 RX ADMIN — SODIUM CHLORIDE 1000 ML: 9 INJECTION, SOLUTION INTRAVENOUS at 15:41

## 2018-07-24 RX ADMIN — PHYTONADIONE 10 MG: 10 INJECTION, EMULSION INTRAMUSCULAR; INTRAVENOUS; SUBCUTANEOUS at 16:22

## 2018-07-24 RX ADMIN — ROSUVASTATIN CALCIUM 40 MG: 20 TABLET, FILM COATED ORAL at 20:14

## 2018-07-24 RX ADMIN — DOPAMINE HYDROCHLORIDE 10 MCG/KG/MIN: 160 INJECTION, SOLUTION INTRAVENOUS at 21:15

## 2018-07-24 RX ADMIN — DOPAMINE HYDROCHLORIDE IN DEXTROSE 10 MCG/KG/MIN: 1.6 INJECTION, SOLUTION INTRAVENOUS at 21:15

## 2018-07-24 RX ADMIN — IPRATROPIUM BROMIDE AND ALBUTEROL SULFATE 3 ML: .5; 3 SOLUTION RESPIRATORY (INHALATION) at 20:10

## 2018-07-24 RX ADMIN — SODIUM CHLORIDE 8 MG/HR: 9 INJECTION, SOLUTION INTRAVENOUS at 22:38

## 2018-07-24 RX ADMIN — SODIUM CHLORIDE 80 MG: 9 INJECTION, SOLUTION INTRAVENOUS at 21:15

## 2018-07-24 ASSESSMENT — COPD QUESTIONNAIRES
COPD SCREENING SCORE: 3
DURING THE PAST 4 WEEKS HOW MUCH DID YOU FEEL SHORT OF BREATH: NONE/LITTLE OF THE TIME
HAVE YOU SMOKED AT LEAST 100 CIGARETTES IN YOUR ENTIRE LIFE: YES
DO YOU EVER COUGH UP ANY MUCUS OR PHLEGM?: NO/ONLY WITH OCCASIONAL COLDS OR INFECTIONS

## 2018-07-24 ASSESSMENT — ENCOUNTER SYMPTOMS
DIZZINESS: 0
HEARTBURN: 0
FLANK PAIN: 0
PHOTOPHOBIA: 0
ABDOMINAL PAIN: 0
SORE THROAT: 0
DIARRHEA: 0
DIAPHORESIS: 0
CHILLS: 0
NAUSEA: 0
CONSTIPATION: 0
SPUTUM PRODUCTION: 0
EYE PAIN: 0
NECK PAIN: 0
NERVOUS/ANXIOUS: 1
WEAKNESS: 1
BACK PAIN: 0
DEPRESSION: 0
EYE DISCHARGE: 0
BLOOD IN STOOL: 1
WHEEZING: 0
BLURRED VISION: 0
SINUS PAIN: 0
STRIDOR: 0
SHORTNESS OF BREATH: 0
FALLS: 0
HEMOPTYSIS: 0
CLAUDICATION: 0
PALPITATIONS: 0
COUGH: 0
VOMITING: 0
PND: 0
EYE REDNESS: 0
FEVER: 0
MYALGIAS: 0
DOUBLE VISION: 0
HEADACHES: 0
ORTHOPNEA: 0

## 2018-07-24 ASSESSMENT — PAIN SCALES - GENERAL
PAINLEVEL_OUTOF10: 0

## 2018-07-24 ASSESSMENT — LIFESTYLE VARIABLES
EVER_SMOKED: YES
EVER_SMOKED: YES

## 2018-07-24 NOTE — ED NOTES
Med rec updated and complete.  Allergies reviewed.  Family ( wife) at bedside verified current  Medications and last doses taken.  Wife stated no antibiotic use in last 30 days.

## 2018-07-24 NOTE — ED TRIAGE NOTES
Patient to ED with complaints of BRB per rectum. Multiple bloody stools today at home. He began feeling weak, clammy, dizzy. He could not stand or walk from the bathroom on his own. He denies abdominal or back pain. He is awake and alert at this time. First BP my EMS was 70/40. He received 300 ml NS PTA. Current BP is 83/47. He does take warfarin for hx of cardiac surgery.

## 2018-07-24 NOTE — ED PROVIDER NOTES
"ED Provider Note    Scribed for Stalin Luis M.D. by Josiah Ahuja. 7/24/2018  3:31 PM    Primary care provider: Nam Le M.D.  Means of arrival: EMS  History obtained from: Patient  History limited by: None     CHIEF COMPLAINT  Chief Complaint   Patient presents with   • Hypotension   • Bloody Stools     HPI  Joshua Medrano is a 52 y.o. male with a past medical history of CAD, COPD, A Fib, Diabetes, who was transported to the Emergency Department via EMS due to bloody stools.   The patient reports an onset of diarrhea today.  He states he had about two episodes of normal appearing \"watery brown stool\" this morning. The patient then reports between 5-10 episodes of loose bloody stools with moderate amount of \"bright red\" blood with some clots throughout the day today. The patient complains he has felt generally weak and lightheaded since his bloody stools. The patient called EMS because he was too weak to ambulate to the bathroom.     The patient takes Coumadin for history of atrial fibrillation. Last INR check was in May 2018. The patient had multiple coronary artery bypass surgery performed on 1/25/18 by Dr. Paez. The patient is followed by Dr. Bedoya, Cardiology.     EMS measured patient's blood pressure to be 70/40 upon arrival to patient's residence. He was treated with 300 mL of NS by EMS en route to the ED.   The patient denies any syncope, neck pain, back pain, abdominal pain, back pain, vomiting, chest pain, or shortness of breath.       REVIEW OF SYSTEMS  Pertinent positives include hematochezia, lightheadedness, generalized weakness. Pertinent negatives include no syncope, neck pain, back pain, abdominal pain, back pain, vomiting, chest pain, or shortness of breath.  All other systems reviewed and negative.    PAST MEDICAL HISTORY   has a past medical history of ASTHMA; Atrial fibrillation (HCC); CAD (coronary artery disease); Chronic obstructive pulmonary disease (HCC); and Diabetes.    SURGICAL " HISTORY   has a past surgical history that includes other abdominal surgery; multiple coronary artery bypass endo vein harvest (12/22/2017); oscar (12/22/2017); and thoracoscopy (Left, 1/25/2018).    SOCIAL HISTORY  Social History   Substance Use Topics   • Smoking status: Former Smoker     Packs/day: 0.50     Years: 30.00     Types: Cigarettes     Quit date: 12/13/2017   • Smokeless tobacco: Never Used      Comment: 1/2-1.5 packs for 30 years   • Alcohol use No      History   Drug Use No       FAMILY HISTORY  Family History   Problem Relation Age of Onset   • Diabetes Mother    • Stroke Mother    • Leukemia Mother    • Diabetes Father    • No Known Problems Sister    • Heart Disease Brother      PPM   • Lung Disease Brother    • Alcohol/Drug Brother    • Diabetes Sister        CURRENT MEDICATIONS  Home Medications     Reviewed by Vinny Williamson (Pharmacy Tech) on 07/24/18 at 1641  Med List Status: Complete   Medication Last Dose Status   albuterol 108 (90 Base) MCG/ACT Aero Soln inhalation aerosol 7/24/2018 Active   amiodarone (CORDARONE) 200 MG Tab 7/24/2018 Active   aspirin (ASA) 81 MG Chew Tab chewable tablet 7/24/2018 Active   budesonide-formoterol (SYMBICORT) 80-4.5 MCG/ACT Aerosol 7/24/2018 Active   carvedilol (COREG) 6.25 MG Tab 7/24/2018 Active   digoxin (LANOXIN) 125 MCG Tab 7/23/2018 Active   furosemide (LASIX) 20 MG Tab 7/24/2018 Active   ipratropium-albuterol (DUONEB) 0.5-2.5 (3) MG/3ML nebulizer solution 7/24/2018 Active   lisinopril (PRINIVIL) 10 MG Tab 7/24/2018 Active   rosuvastatin (CRESTOR) 40 MG tablet 7/23/2018 Active   spironolactone (ALDACTONE) 50 MG Tab 7/24/2018 Active   warfarin (COUMADIN) 5 MG Tab 7/24/2018 Active              ALLERGIES  Allergies   Allergen Reactions   • Erythromycin Anaphylaxis     Rxn - 1994   • Cillins [Penicillins] Anaphylaxis and Rash     As a child, tolerated cefepime (12/2017), ceftriaxone (1/2018), cefazolin (12/2017)   • Metoprolol      Pt stated got [place] and  "aggressive    • Shellfish Allergy Anaphylaxis     Pt stated that he is allergic to all seafood, will develop a rash and says that rash will clear up with benadryl     PHYSICAL EXAM  VITAL SIGNS: BP (!) 83/47   Pulse (!) 52   Temp 36.4 °C (97.5 °F)   Resp 12   Ht 1.956 m (6' 5\")   Wt (!) 140 kg (308 lb 10.3 oz)   SpO2 97%   BMI 36.60 kg/m²     Vital signs reviewed.  Constitutional:  Appears well-developed and well-nourished. No distress.   Head: Normocephalic.   Mouth/Throat: Oropharynx is dry.   Eyes: EOM are normal. Pupils are equal, round, and reactive to light.   Neck: Normal range of motion. Neck supple.   Cardiovascular: Normal rate, regular rhythm and normal heart sounds.    Pulmonary/Chest: Effort normal and breath sounds normal. No wheezes.   Abdominal: Soft. There is no tenderness. There is no rebound and no guarding.   Musculoskeletal: Exhibits no edema.   Lymphadenopathy: No cervical adenopathy.   Neurological: Patient is alert and oriented to person, place, and time. CNs II - XII intact. DTRs intact. Normal sensation and strength.  Skin: Chronic venous stasis changes in bilateral lower extremities.   Psychiatric: Patient has a normal mood and affect. Behavior is normal.      LABS  Results for orders placed or performed during the hospital encounter of 07/24/18   COD (ADULT)   Result Value Ref Range    ABO Grouping Only A     Rh Grouping Only POS     Antibody Screen-Cod NEG    CBC WITH DIFFERENTIAL   Result Value Ref Range    WBC 18.0 (H) 4.8 - 10.8 K/uL    RBC 4.82 4.70 - 6.10 M/uL    Hemoglobin 8.4 (L) 14.0 - 18.0 g/dL    Hematocrit 30.7 (L) 42.0 - 52.0 %    MCV 63.7 (L) 81.4 - 97.8 fL    MCH 17.4 (L) 27.0 - 33.0 pg    MCHC 27.4 (L) 33.7 - 35.3 g/dL    RDW 49.3 35.9 - 50.0 fL    Platelet Count 408 164 - 446 K/uL    MPV 9.6 9.0 - 12.9 fL    Neutrophils-Polys 78.50 (H) 44.00 - 72.00 %    Lymphocytes 7.20 (L) 22.00 - 41.00 %    Monocytes 5.80 0.00 - 13.40 %    Eosinophils 4.70 0.00 - 6.90 %    " Basophils 0.90 0.00 - 1.80 %    Immature Granulocytes 2.90 (H) 0.00 - 0.90 %    Nucleated RBC 0.00 /100 WBC    Lymphs (Absolute) 1.30 1.00 - 4.80 K/uL    Monos (Absolute) 1.04 (H) 0.00 - 0.85 K/uL    Eos (Absolute) 0.84 (H) 0.00 - 0.51 K/uL    Baso (Absolute) 0.16 (H) 0.00 - 0.12 K/uL    Immature Granulocytes (abs) 0.52 (H) 0.00 - 0.11 K/uL    NRBC (Absolute) 0.00 K/uL    Neutrophils (Absolute) 14.15 (H) 1.82 - 7.42 K/uL    Hypochromia 2+     Anisocytosis 2+     Microcytosis 2+    COMP METABOLIC PANEL   Result Value Ref Range    Sodium 138 135 - 145 mmol/L    Potassium 4.6 3.6 - 5.5 mmol/L    Chloride 110 96 - 112 mmol/L    Co2 22 20 - 33 mmol/L    Anion Gap 6.0 0.0 - 11.9    Glucose 127 (H) 65 - 99 mg/dL    Bun 22 8 - 22 mg/dL    Creatinine 1.38 0.50 - 1.40 mg/dL    Calcium 8.4 (L) 8.5 - 10.5 mg/dL    AST(SGOT) 12 12 - 45 U/L    ALT(SGPT) 12 2 - 50 U/L    Alkaline Phosphatase 77 30 - 99 U/L    Total Bilirubin 0.4 0.1 - 1.5 mg/dL    Albumin 3.4 3.2 - 4.9 g/dL    Total Protein 6.6 6.0 - 8.2 g/dL    Globulin 3.2 1.9 - 3.5 g/dL    A-G Ratio 1.1 g/dL   LIPASE   Result Value Ref Range    Lipase 40 11 - 82 U/L   PROTHROMBIN TIME   Result Value Ref Range    PT 34.3 (H) 12.0 - 14.6 sec    INR 3.43 (H) 0.87 - 1.13   APTT   Result Value Ref Range    APTT 42.2 (H) 24.7 - 36.0 sec   ESTIMATED GFR   Result Value Ref Range    GFR If African American >60 >60 mL/min/1.73 m 2    GFR If Non African American 54 (A) >60 mL/min/1.73 m 2   PERIPHERAL SMEAR REVIEW   Result Value Ref Range    Peripheral Smear Review see below    PLATELET ESTIMATE   Result Value Ref Range    Plt Estimation Normal    MORPHOLOGY   Result Value Ref Range    RBC Morphology Present     Poikilocytosis 2+     Ovalocytes 2+     Tear Drop Cells 1+    DIFFERENTIAL COMMENT   Result Value Ref Range    Comments-Diff see below    FRESH FROZEN PLASMA   Result Value Ref Range    Component Ft       FPT                 Plasma, Thawed      [id number]   issued        07/24/18   16:47      Product Type Plasma  Thawed     Dispense Status Issued     Unit Number (Barcoded) [id number]     Product Code (Barcoded) N3291T52     Blood Type (Barcoded) 6200     Component Ft       FPT                 Plasma, Thawed      [id number]   issued       07/24/18   17:30      Product Type Plasma  Thawed     Dispense Status Issued     Unit Number (Barcoded) [id number]     Product Code (Barcoded) U0181R20     Blood Type (Barcoded) 6200       All labs reviewed by me.    RADIOLOGY  DX-CHEST-PORTABLE (1 VIEW)   Final Result      No acute cardiopulmonary abnormality.        The radiologist's interpretation of all radiological studies have been reviewed by me.    COURSE & MEDICAL DECISION MAKING  Pertinent Labs & Imaging studies reviewed. (See chart for details) The patient's [facility] and past medical records were reviewed    3:31 PM - Patient seen and examined at bedside. Ordered Chest X Ray, COD, CBC, CMP, lipase, prothrombin time, APTT to evaluate his symptoms. The patient was resuscitated with IV fluids for hypotension. The differential diagnoses include but are not limited to: anemia vs. Thrombocytopenia vs. Coagulopathy.     3:56 PM Spoke with pharmacy regarding treatment with medication. The patient was treated with Phytonadione 10 mg IV.     4:18 PM Paged Hospitalist.     4:24 PM Spoke with Dr. Reynoso, Hospitalist, about the patient's condition. Dr. Reynoso is agreeable to admit the patient.  There was a good response to resuscitation with IV fluids.     4:50 PM Paged GI.     4:55 PM Discussed patient's condition with Dr. Molina, [facility], who is agreeable to consult.         CRITICAL CARE  The very real possibilty of a deterioration of this patient's condition required the highest level of my preparedness for sudden, emergent intervention.  I provided critical care services, which included medication orders, frequent reevaluations of the patient's condition and  response to treatment, ordering and reviewing test results, and discussing the case with various consultants.  The critical care time associated with the care of the patient was 35 minutes. Review chart for interventions. This time is exclusive of any other billable procedures.        Disposition:  Patient will be admitted to the hospital under the care of Dr. Reynoso (Hospitalist) in guarded condition.     FINAL IMPRESSION  1. Hematochezia          Josiah LARA (Scribe), am scribing for, and in the presence of, Stalin Luis M.D..    Electronically signed by: Josiah hAuja (Scribe), 7/24/2018    Stalin LARA M.D. personally performed the services described in this documentation, as scribed by Josiah Ahuja in my presence, and it is both accurate and complete.    The note accurately reflects work and decisions made by me.  Stalin Luis  7/24/2018  9:37 PM

## 2018-07-25 LAB
ALBUMIN SERPL BCP-MCNC: 3.2 G/DL (ref 3.2–4.9)
ALBUMIN/GLOB SERPL: 1.3 G/DL
ALP SERPL-CCNC: 62 U/L (ref 30–99)
ALT SERPL-CCNC: 10 U/L (ref 2–50)
ANION GAP SERPL CALC-SCNC: 6 MMOL/L (ref 0–11.9)
AST SERPL-CCNC: 12 U/L (ref 12–45)
BARCODED ABORH UBTYP: 9500
BARCODED PRD CODE UBPRD: NORMAL
BARCODED UNIT NUM UBUNT: NORMAL
BASOPHILS # BLD AUTO: 0.8 % (ref 0–1.8)
BASOPHILS # BLD: 0.16 K/UL (ref 0–0.12)
BILIRUB SERPL-MCNC: 0.7 MG/DL (ref 0.1–1.5)
BUN SERPL-MCNC: 23 MG/DL (ref 8–22)
CALCIUM SERPL-MCNC: 7.9 MG/DL (ref 8.5–10.5)
CHLORIDE SERPL-SCNC: 112 MMOL/L (ref 96–112)
CO2 SERPL-SCNC: 24 MMOL/L (ref 20–33)
COMPONENT P 8504P: NORMAL
CREAT SERPL-MCNC: 1.1 MG/DL (ref 0.5–1.4)
DIGOXIN SERPL-MCNC: 0.4 NG/ML (ref 0.8–2)
EKG IMPRESSION: NORMAL
EOSINOPHIL # BLD AUTO: 0.36 K/UL (ref 0–0.51)
EOSINOPHIL NFR BLD: 1.9 % (ref 0–6.9)
ERYTHROCYTE [DISTWIDTH] IN BLOOD BY AUTOMATED COUNT: 56.7 FL (ref 35.9–50)
GLOBULIN SER CALC-MCNC: 2.4 G/DL (ref 1.9–3.5)
GLUCOSE SERPL-MCNC: 110 MG/DL (ref 65–99)
HCT VFR BLD AUTO: 23.1 % (ref 42–52)
HCT VFR BLD AUTO: 24.7 % (ref 42–52)
HCT VFR BLD AUTO: 25 % (ref 42–52)
HGB BLD-MCNC: 7 G/DL (ref 14–18)
HGB BLD-MCNC: 7.4 G/DL (ref 14–18)
HGB BLD-MCNC: 7.6 G/DL (ref 14–18)
IMM GRANULOCYTES # BLD AUTO: 0.94 K/UL (ref 0–0.11)
IMM GRANULOCYTES NFR BLD AUTO: 4.9 % (ref 0–0.9)
INR PPP: 1.35 (ref 0.87–1.13)
LV EJECT FRACT  99904: 60
LYMPHOCYTES # BLD AUTO: 1.23 K/UL (ref 1–4.8)
LYMPHOCYTES NFR BLD: 6.5 % (ref 22–41)
MAGNESIUM SERPL-MCNC: 1.8 MG/DL (ref 1.5–2.5)
MCH RBC QN AUTO: 19.9 PG (ref 27–33)
MCHC RBC AUTO-ENTMCNC: 30 G/DL (ref 33.7–35.3)
MCV RBC AUTO: 66.4 FL (ref 81.4–97.8)
MONOCYTES # BLD AUTO: 1.05 K/UL (ref 0–0.85)
MONOCYTES NFR BLD AUTO: 5.5 % (ref 0–13.4)
NEUTROPHILS # BLD AUTO: 15.27 K/UL (ref 1.82–7.42)
NEUTROPHILS NFR BLD: 80.4 % (ref 44–72)
NRBC # BLD AUTO: 0 K/UL
NRBC BLD-RTO: 0 /100 WBC
PHOSPHATE SERPL-MCNC: 2.9 MG/DL (ref 2.5–4.5)
PLATELET # BLD AUTO: 357 K/UL (ref 164–446)
PMV BLD AUTO: 9.5 FL (ref 9–12.9)
POTASSIUM SERPL-SCNC: 4.2 MMOL/L (ref 3.6–5.5)
PRODUCT TYPE UPROD: NORMAL
PROT SERPL-MCNC: 5.6 G/DL (ref 6–8.2)
PROTHROMBIN TIME: 16.4 SEC (ref 12–14.6)
RBC # BLD AUTO: 3.72 M/UL (ref 4.7–6.1)
SODIUM SERPL-SCNC: 142 MMOL/L (ref 135–145)
TROPONIN I SERPL-MCNC: 0.04 NG/ML (ref 0–0.04)
TROPONIN I SERPL-MCNC: 0.05 NG/ML (ref 0–0.04)
TROPONIN I SERPL-MCNC: 0.05 NG/ML (ref 0–0.04)
UNIT STATUS USTAT: NORMAL
WBC # BLD AUTO: 19 K/UL (ref 4.8–10.8)

## 2018-07-25 PROCEDURE — 99233 SBSQ HOSP IP/OBS HIGH 50: CPT | Performed by: HOSPITALIST

## 2018-07-25 PROCEDURE — 700101 HCHG RX REV CODE 250: Performed by: INTERNAL MEDICINE

## 2018-07-25 PROCEDURE — 85018 HEMOGLOBIN: CPT | Mod: 91

## 2018-07-25 PROCEDURE — 80053 COMPREHEN METABOLIC PANEL: CPT

## 2018-07-25 PROCEDURE — 99291 CRITICAL CARE FIRST HOUR: CPT | Performed by: INTERNAL MEDICINE

## 2018-07-25 PROCEDURE — 36430 TRANSFUSION BLD/BLD COMPNT: CPT

## 2018-07-25 PROCEDURE — 84100 ASSAY OF PHOSPHORUS: CPT

## 2018-07-25 PROCEDURE — 160203 HCHG ENDO MINUTES - 1ST 30 MINS LEVEL 4: Performed by: INTERNAL MEDICINE

## 2018-07-25 PROCEDURE — 700102 HCHG RX REV CODE 250 W/ 637 OVERRIDE(OP): Performed by: INTERNAL MEDICINE

## 2018-07-25 PROCEDURE — 83735 ASSAY OF MAGNESIUM: CPT

## 2018-07-25 PROCEDURE — 93306 TTE W/DOPPLER COMPLETE: CPT | Mod: 26 | Performed by: INTERNAL MEDICINE

## 2018-07-25 PROCEDURE — 700111 HCHG RX REV CODE 636 W/ 250 OVERRIDE (IP): Performed by: INTERNAL MEDICINE

## 2018-07-25 PROCEDURE — 03HY32Z INSERTION OF MONITORING DEVICE INTO UPPER ARTERY, PERCUTANEOUS APPROACH: ICD-10-PCS | Performed by: INTERNAL MEDICINE

## 2018-07-25 PROCEDURE — 85025 COMPLETE CBC W/AUTO DIFF WBC: CPT

## 2018-07-25 PROCEDURE — 0DJD8ZZ INSPECTION OF LOWER INTESTINAL TRACT, VIA NATURAL OR ARTIFICIAL OPENING ENDOSCOPIC: ICD-10-PCS | Performed by: INTERNAL MEDICINE

## 2018-07-25 PROCEDURE — 86923 COMPATIBILITY TEST ELECTRIC: CPT

## 2018-07-25 PROCEDURE — 85610 PROTHROMBIN TIME: CPT

## 2018-07-25 PROCEDURE — P9034 PLATELETS, PHERESIS: HCPCS

## 2018-07-25 PROCEDURE — 99152 MOD SED SAME PHYS/QHP 5/>YRS: CPT | Performed by: INTERNAL MEDICINE

## 2018-07-25 PROCEDURE — 160048 HCHG OR STATISTICAL LEVEL 1-5: Performed by: INTERNAL MEDICINE

## 2018-07-25 PROCEDURE — 93325 DOPPLER ECHO COLOR FLOW MAPG: CPT

## 2018-07-25 PROCEDURE — 700117 HCHG RX CONTRAST REV CODE 255: Performed by: INTERNAL MEDICINE

## 2018-07-25 PROCEDURE — 80162 ASSAY OF DIGOXIN TOTAL: CPT

## 2018-07-25 PROCEDURE — 93308 TTE F-UP OR LMTD: CPT

## 2018-07-25 PROCEDURE — A9270 NON-COVERED ITEM OR SERVICE: HCPCS | Performed by: INTERNAL MEDICINE

## 2018-07-25 PROCEDURE — 30233K1 TRANSFUSION OF NONAUTOLOGOUS FROZEN PLASMA INTO PERIPHERAL VEIN, PERCUTANEOUS APPROACH: ICD-10-PCS | Performed by: INTERNAL MEDICINE

## 2018-07-25 PROCEDURE — 700111 HCHG RX REV CODE 636 W/ 250 OVERRIDE (IP)

## 2018-07-25 PROCEDURE — 74174 CTA ABD&PLVS W/CONTRAST: CPT

## 2018-07-25 PROCEDURE — 770022 HCHG ROOM/CARE - ICU (200)

## 2018-07-25 PROCEDURE — 93321 DOPPLER ECHO F-UP/LMTD STD: CPT

## 2018-07-25 PROCEDURE — 85014 HEMATOCRIT: CPT | Mod: 91

## 2018-07-25 PROCEDURE — 84484 ASSAY OF TROPONIN QUANT: CPT | Mod: 91

## 2018-07-25 PROCEDURE — P9016 RBC LEUKOCYTES REDUCED: HCPCS

## 2018-07-25 PROCEDURE — 94640 AIRWAY INHALATION TREATMENT: CPT

## 2018-07-25 RX ORDER — SODIUM CHLORIDE 9 MG/ML
INJECTION, SOLUTION INTRAVENOUS
Status: COMPLETED
Start: 2018-07-25 | End: 2018-07-25

## 2018-07-25 RX ORDER — MIDAZOLAM HYDROCHLORIDE 1 MG/ML
INJECTION INTRAMUSCULAR; INTRAVENOUS
Status: DISCONTINUED | OUTPATIENT
Start: 2018-07-25 | End: 2018-07-25 | Stop reason: HOSPADM

## 2018-07-25 RX ORDER — SODIUM CHLORIDE 9 MG/ML
INJECTION, SOLUTION INTRAVENOUS
Status: ACTIVE
Start: 2018-07-25 | End: 2018-07-25

## 2018-07-25 RX ORDER — MIDAZOLAM HYDROCHLORIDE 1 MG/ML
.5-2 INJECTION INTRAMUSCULAR; INTRAVENOUS PRN
Status: ACTIVE | OUTPATIENT
Start: 2018-07-25 | End: 2018-07-25

## 2018-07-25 RX ORDER — SODIUM CHLORIDE 9 MG/ML
500 INJECTION, SOLUTION INTRAVENOUS
Status: DISCONTINUED | OUTPATIENT
Start: 2018-07-25 | End: 2018-07-25

## 2018-07-25 RX ADMIN — IPRATROPIUM BROMIDE AND ALBUTEROL SULFATE 3 ML: .5; 3 SOLUTION RESPIRATORY (INHALATION) at 07:16

## 2018-07-25 RX ADMIN — DOPAMINE HYDROCHLORIDE IN DEXTROSE 5 MCG/KG/MIN: 1.6 INJECTION, SOLUTION INTRAVENOUS at 21:14

## 2018-07-25 RX ADMIN — METRONIDAZOLE 500 MG: 500 INJECTION, SOLUTION INTRAVENOUS at 06:22

## 2018-07-25 RX ADMIN — POLYETHYLENE GLYCOL 3350, SODIUM SULFATE ANHYDROUS, SODIUM BICARBONATE, SODIUM CHLORIDE, POTASSIUM CHLORIDE 4 L: 236; 22.74; 6.74; 5.86; 2.97 POWDER, FOR SOLUTION ORAL at 04:04

## 2018-07-25 RX ADMIN — IOHEXOL 100 ML: 350 INJECTION, SOLUTION INTRAVENOUS at 00:06

## 2018-07-25 RX ADMIN — IRON SUCROSE 200 MG: 20 INJECTION, SOLUTION INTRAVENOUS at 06:22

## 2018-07-25 RX ADMIN — ROSUVASTATIN CALCIUM 40 MG: 20 TABLET, FILM COATED ORAL at 17:10

## 2018-07-25 RX ADMIN — DOPAMINE HYDROCHLORIDE IN DEXTROSE 12 MCG/KG/MIN: 1.6 INJECTION, SOLUTION INTRAVENOUS at 12:08

## 2018-07-25 RX ADMIN — METRONIDAZOLE 500 MG: 500 INJECTION, SOLUTION INTRAVENOUS at 00:00

## 2018-07-25 ASSESSMENT — COGNITIVE AND FUNCTIONAL STATUS - GENERAL
DRESSING REGULAR UPPER BODY CLOTHING: A LITTLE
DRESSING REGULAR LOWER BODY CLOTHING: A LOT
HELP NEEDED FOR BATHING: A LOT
TURNING FROM BACK TO SIDE WHILE IN FLAT BAD: A LITTLE
STANDING UP FROM CHAIR USING ARMS: A LITTLE
SUGGESTED CMS G CODE MODIFIER MOBILITY: CK
SUGGESTED CMS G CODE MODIFIER DAILY ACTIVITY: CK
DAILY ACTIVITIY SCORE: 17
PERSONAL GROOMING: A LITTLE
MOVING FROM LYING ON BACK TO SITTING ON SIDE OF FLAT BED: A LITTLE
TOILETING: A LITTLE
MOBILITY SCORE: 19
WALKING IN HOSPITAL ROOM: A LITTLE
CLIMB 3 TO 5 STEPS WITH RAILING: A LITTLE

## 2018-07-25 ASSESSMENT — PAIN SCALES - GENERAL
PAINLEVEL_OUTOF10: 0

## 2018-07-25 ASSESSMENT — ENCOUNTER SYMPTOMS
SPEECH CHANGE: 0
DIARRHEA: 0
NERVOUS/ANXIOUS: 0
SHORTNESS OF BREATH: 0
ORTHOPNEA: 0
SPUTUM PRODUCTION: 0
FEVER: 0
EYE PAIN: 0
FALLS: 0
WEAKNESS: 0
ABDOMINAL PAIN: 0
STRIDOR: 0
NECK PAIN: 0
BACK PAIN: 0
BLOOD IN STOOL: 1
HALLUCINATIONS: 0
LOSS OF CONSCIOUSNESS: 0
HEMOPTYSIS: 0
NAUSEA: 0
EYE REDNESS: 0
FLANK PAIN: 0
DIZZINESS: 1
BLURRED VISION: 0
MEMORY LOSS: 0
FOCAL WEAKNESS: 0
HEADACHES: 0
COUGH: 0
SEIZURES: 0
VOMITING: 0
PALPITATIONS: 0
EYE DISCHARGE: 0
CHILLS: 0

## 2018-07-25 ASSESSMENT — PATIENT HEALTH QUESTIONNAIRE - PHQ9
SUM OF ALL RESPONSES TO PHQ9 QUESTIONS 1 AND 2: 0
1. LITTLE INTEREST OR PLEASURE IN DOING THINGS: NOT AT ALL
2. FEELING DOWN, DEPRESSED, IRRITABLE, OR HOPELESS: NOT AT ALL

## 2018-07-25 ASSESSMENT — LIFESTYLE VARIABLES: SUBSTANCE_ABUSE: 0

## 2018-07-25 NOTE — H&P
Hospital Medicine History & Physical Note    Date of Service  7/24/2018    Primary Care Physician  Nam Le M.D.    Consultants  Gastroenterology - [facility] / Dr Molina  Critical care  Cardiology - Dr Samuels     Code Status  FULL CODE    Chief Complaint  Hematochezia    History of Presenting Illness  52 y.o. male who presented 7/24/2018 with bright red blood per rectum starting today.  Patient with underlying history of coronary artery disease status post coronary artery bypass graft, paroxysmal atrial fibrillation anticoagulated with Coumadin, chronic systolic congestive heart failure with last known ejection fraction of 25-30%.  Followed by Dr. Isrrael Bedoya from cardiology.  Presented to the emergency room today because of hematochezia.  Patient reports being in his baseline level of health until this morning.  Patient was at his daughter's baby shower yesterday where he had baked beans and two burgers.  This morning he started having diarrhea.  Initially he had a loose bowel movement.  This was watery with minimal amount of blood.  Subsequently he had another episode of grossly bloody bowel movement prompting him to come to the emergency room for further evaluation.  Report this was painless.  No associated fever, chills, nausea or vomiting.  No headaches.  No chest pain.  No abdominal pain.  No rectal pain.  On Coumadin.  On presentation noted to have a supratherapeutic INR of 3.43 with a hemoglobin of 8.4 compared to prior known hemoglobin of 8.7.    In the emergency room he was noted to be minimally hypotensive which rapidly improved with IV fluid hydration.  He received 2 units of FFP and 10 mg of intravenous vitamin K for reversal of his INR.  Gastroenterology team was consulted and patient was evaluated by Dr. Molina from [facility].  Meanwhile patient was admitted to the telemetry unit with ongoing monitoring of his hemoglobin and vital signs.  Subsequently on the floor, patient developed a  large bloody bowel movement, per nursing report 1 L in volume.  On evaluation patient with subsequent hypertension.  Patient transferred to the intensive care unit, started on vasopressors and a central venous access and arterial access established.  Also development of bradycardia with type II block, cardiology team consulted.  Critical care team consulted.  Case discussed with gastroenterology, plans to proceed with a CTA of the abdomen and pelvis.    Review of Systems  Review of Systems   Constitutional: Positive for malaise/fatigue. Negative for chills, diaphoresis and fever.   HENT: Negative for congestion, ear discharge, ear pain, hearing loss, nosebleeds, sinus pain, sore throat and tinnitus.    Eyes: Negative for blurred vision, double vision, photophobia, pain, discharge and redness.   Respiratory: Negative for cough, hemoptysis, sputum production, shortness of breath, wheezing and stridor.    Cardiovascular: Positive for leg swelling (chronic). Negative for chest pain, palpitations, orthopnea, claudication and PND.   Gastrointestinal: Positive for blood in stool. Negative for abdominal pain, constipation, diarrhea, heartburn, melena, nausea and vomiting.   Genitourinary: Negative for dysuria, flank pain, frequency, hematuria and urgency.   Musculoskeletal: Negative for back pain, falls, joint pain, myalgias and neck pain.   Skin: Negative for itching and rash.   Neurological: Positive for weakness. Negative for dizziness and headaches.   Psychiatric/Behavioral: Negative for depression. The patient is nervous/anxious.        Past Medical History   has a past medical history of ASTHMA; Atrial fibrillation (HCC); CAD (coronary artery disease); Chronic obstructive pulmonary disease (HCC); and Diabetes.    Surgical History   has a past surgical history that includes other abdominal surgery; multiple coronary artery bypass endo vein harvest (12/22/2017); oscar (12/22/2017); and thoracoscopy (Left, 1/25/2018).      Family History  family history includes Alcohol/Drug in his brother; Diabetes in his father, mother, and sister; Heart Disease in his brother; Leukemia in his mother; Lung Disease in his brother; No Known Problems in his sister; Stroke in his mother.     Social History   reports that he quit smoking about 7 months ago. His smoking use included Cigarettes. He has a 15.00 pack-year smoking history. He has never used smokeless tobacco. He reports that he does not drink alcohol or use drugs.    Allergies  Allergies   Allergen Reactions   • Erythromycin Anaphylaxis     Rxn - 1994   • Cillins [Penicillins] Anaphylaxis and Rash     As a child, tolerated cefepime (12/2017), ceftriaxone (1/2018), cefazolin (12/2017)   • Metoprolol      Pt stated got [place] and aggressive    • Shellfish Allergy Anaphylaxis     Pt stated that he is allergic to all seafood, will develop a rash and says that rash will clear up with benadryl       Medications  Prior to Admission Medications   Prescriptions Last Dose Informant Patient Reported? Taking?   albuterol 108 (90 Base) MCG/ACT Aero Soln inhalation aerosol 7/24/2018 at 1200 Patient No No   Sig: Inhale 2 Puffs by mouth every 6 hours as needed for Shortness of Breath.   amiodarone (CORDARONE) 200 MG Tab 7/24/2018 at 0900 Patient No No   Sig: Take 1 Tab by mouth every day.   aspirin (ASA) 81 MG Chew Tab chewable tablet 7/24/2018 at 0900 Patient No No   Sig: Take 1 Tab by mouth every day.   budesonide-formoterol (SYMBICORT) 80-4.5 MCG/ACT Aerosol 7/24/2018 at 1000 Patient No No   Sig: Inhale 2 Puffs by mouth 2 Times a Day.   carvedilol (COREG) 6.25 MG Tab 7/24/2018 at 0900 Patient No No   Sig: Take 1 Tab by mouth 2 times a day, with meals.   digoxin (LANOXIN) 125 MCG Tab 7/23/2018 at 1800 Patient No No   Sig: Take 1 Tab by mouth every day at 6 PM.   furosemide (LASIX) 20 MG Tab 7/24/2018 at 0900 Patient No No   Sig: Take 1 Tab by mouth every day.   ipratropium-albuterol (DUONEB) 0.5-2.5 (3)  MG/3ML nebulizer solution 7/24/2018 at 1200 Patient No No   Sig: 3 mL by Nebulization route every 6 hours as needed for Shortness of Breath.   lisinopril (PRINIVIL) 10 MG Tab 7/24/2018 at 1200 Patient Yes Yes   Sig: Take 10 mg by mouth every day.   rosuvastatin (CRESTOR) 40 MG tablet 7/23/2018 at 1930 Patient No No   Sig: Take 1 Tab by mouth every evening.   spironolactone (ALDACTONE) 50 MG Tab 7/24/2018 at 1200 Patient No No   Sig: Take 1 Tab by mouth every day.   warfarin (COUMADIN) 5 MG Tab 7/24/2018 at 0800 Patient Yes No   Sig: Take 5-10 mg by mouth every day. 5 mg on Mondays and wednesdays  10 mg all other days      Facility-Administered Medications: None       Physical Exam  Blood Pressure: 133/48   Temperature: 36.1 °C (97 °F)   Pulse: 80   Respiration: 16   Pulse Oximetry: 100 %     Physical Exam   Constitutional: He is oriented to person, place, and time. He appears well-developed and well-nourished. No distress.   HENT:   Head: Normocephalic.   Mouth/Throat: Oropharynx is clear and moist. No oropharyngeal exudate.   Eyes: EOM are normal. Pupils are equal, round, and reactive to light. No scleral icterus.   Conjunctival pallor   Neck: No JVD present. No thyromegaly present.   Cardiovascular: Normal rate and regular rhythm.    Murmur heard.  Pulses:       Posterior tibial pulses are 2+ on the right side, and 2+ on the left side.   Cap refill < 3s   Pulmonary/Chest: No stridor. No respiratory distress. He has no wheezes. He has no rales.   Abdominal: Soft. Bowel sounds are normal. He exhibits no distension. There is no tenderness. There is no rebound and no guarding.   Musculoskeletal: He exhibits edema. He exhibits no tenderness or deformity.   Neurological: He is alert and oriented to person, place, and time. He has normal reflexes. No cranial nerve deficit.   Skin: Skin is warm and dry. He is not diaphoretic.   Varicose veins   Psychiatric: He has a normal mood and affect. His behavior is normal. Judgment  and thought content normal.       Laboratory:  Recent Labs      07/24/18   1520  07/24/18 2023   WBC  18.0*  20.9*   RBC  4.82  3.83*   HEMOGLOBIN  8.4*  6.7*   HEMATOCRIT  30.7*  24.5*   MCV  63.7*  64.0*   MCH  17.4*  17.5*   MCHC  27.4*  27.3*   RDW  49.3  50.1*   PLATELETCT  408  389   MPV  9.6  10.2     Recent Labs      07/24/18   1520   SODIUM  138   POTASSIUM  4.6   CHLORIDE  110   CO2  22   GLUCOSE  127*   BUN  22   CREATININE  1.38   CALCIUM  8.4*     Recent Labs      07/24/18   1520   ALTSGPT  12   ASTSGOT  12   ALKPHOSPHAT  77   TBILIRUBIN  0.4   LIPASE  40   GLUCOSE  127*     Recent Labs      07/24/18   1520  07/24/18 2023   APTT  42.2*   --    INR  3.43*  2.05*             Lab Results   Component Value Date    TROPONINI 0.04 03/23/2018       Urinalysis:    Lab Results   Component Value Date    SPECGRAVITY 1.014 12/27/2017    GLUCOSEUR Negative 12/27/2017    KETONES Negative 12/27/2017    NITRITE Negative 12/27/2017    WBCURINE 100-150 (A) 12/27/2017    RBCURINE 2-5 (A) 12/27/2017    BACTERIA Negative 12/27/2017    EPITHELCELL Few 12/27/2017        Imaging:  DX-CHEST-PORTABLE (1 VIEW)   Final Result         1.  Cardiomegaly with possible mild central pulmonary vascular congestion.      DX-CHEST-PORTABLE (1 VIEW)   Final Result      No acute cardiopulmonary abnormality.      CTA ABDOMEN PELVIS W & W/O POST PROCESS    (Results Pending)         Assessment/Plan:  I anticipate this patient will require at least two midnights for appropriate medical management, necessitating inpatient admission.    Hemorrhagic shock (HCC)- (present on admission)   Assessment & Plan    2/2 to acute GI bleeding  Patient is critically ill, transferred to the ICU  Maintain at least 2 large bore IV access  Patient on IV levophed / dopamine started for bradycardia on telemetry in addition   CVC access established   Arterial line for HD established  Critical care consulted   Plan of care otherwise as noted in GI bleeding         Bradycardia- (present on admission)   Assessment & Plan    Type II block   Hold all AVN blockers / anti HTN medications with shock  Hold amiodarone  Cardiology consulted, Case discussed with Dr Samuels - He will evaluate the patient  Defer further recommendations to cardiology team - pending then on dopamine IV  Obtain EKG / Troponin's        Acute lower GI bleeding- (present on admission)   Assessment & Plan    This is painless bleeding   Differentials at this time include   1. Upper GI bleeding, highly unlikely but cannot be ruled out   2.  Diverticular bleeding highly likely   3.  Infectious etiologies cannot be ruled out  4.  Ongoing anticoagulation with Coumadin, supratherapeutic INR on presentation    Reversal of INR in the emergency room with 2 units of FFP and 10 mg of IV vitamin K  GI consulted in ER, seen by Dr Molina with plans for colonoscopy tomorrow    On the floor development of massive hematochezia with hypotension, 1L bowel movement with gross hemorrhage per nursing staff  Now hypotensive with concerns for development of hemorrhagic shock    Transfer the patient to the ICU at this time, orders for this written   IV Protonix 80 mg push, followed by 8 mg an hour infusion  Orders written for 2 units of stat PRBC  Start Levophed to maintain mean arterial pressures greater than 65  Repeat stat hemoglobin/hematocrit and INR  Reversal of INR with FFP to continue based on above results  Obtain stat CTA abdomen and pelvis  Hb q 4 hourly, Monitor Hb / Restrictive transfusion strategy  Workup of infectious etiologies with evaluation for C. difficile, stool culture, stool leukocytes and stool ova and parasites  Holding aspirin/Coumadin  Closely monitor HD parameters in ICU  Maintain large bore IV access   Hold all anti HTN / Cardiac medications    I personally discussed the case with Dr Molina over the telephone 8.27 pm, 07/24/18. I updated Dr Molina regarding the events. Per Dr Molina to proceed with CTA.  Involve IR if any evidence of bleeding on CTA, if not plan ongoing medical therapy in ICU. Planning of colonoscopy / endoscopic evaluation per GI team    Discussed with pharmacy to administer factor VII        Class 2 severe obesity due to excess calories with serious comorbidity and body mass index (BMI) of 36.0 to 36.9 in adult (HCC)- (present on admission)   Assessment & Plan    Body mass index is 36.6 kg/m².        SHASHANK (obstructive sleep apnea)- (present on admission)   Assessment & Plan    Not formally diagnosed per patient  Intermittently uses nighttime oxygen  Recommend outpatient pulmonology/sleep clinic evaluation        Dyslipidemia- (present on admission)   Assessment & Plan    Continue Crestor        CKD (chronic kidney disease), stage II- (present on admission)   Assessment & Plan    Monitor renal function / Avoid nephrotoxins and dose medications renally        Cigarette nicotine dependence with nicotine-induced disorder- (present on admission)   Assessment & Plan    Consult and educated regarding smoking cessation        Essential hypertension- (present on admission)   Assessment & Plan    Holding at home regimen with concerns for underlying hemorrhagic shock/GI bleeding        Paroxysmal atrial fibrillation (HCC)- (present on admission)   Assessment & Plan    Holding digoxin and amiodarone with bradycardia, concern for heart block on telemetry   Carvedilol on hold with underlying hemorrhagic shock  Coumadin on hold with underlying profuse hemorrhage  Continue close clinical monitoring on telemetry        Normocytic anemia- (present on admission)   Assessment & Plan    Acute on chronic anemia  Likely underlying component of chronic disease  Now presenting with acute GI bleeding  Plan for acute issues as noted above in GI bleeding  We will check iron studies, ferritin, folate, reticulocyte count, TSH, preferably prior to transfusion  Monitor Hb / Restrictive transfusion strategy        Leukocytosis-  (present on admission)   Assessment & Plan    Chronic  Acute component may be related to underlying GI bleeding/stress  For now daily monitoring with CBC, no acute infectious concerns evident  Evaluation for GI related infectious concerns as discussed above  Will likely need outpatient hematology evaluation for consideration of a bone marrow biopsy        CAD (coronary artery disease)- (present on admission)   Assessment & Plan    Underwent coronary artery bypass graft in December 2017  Aspirin on hold with underlying GI bleeding  Coumadin on hold with underlying GI bleeding  Beta-blockers on hold with underlying hypotension / bradycardia   Continue statins  Patient will need ongoing outpatient cardiology follow-up, established with Dr Bedoya / Dr Ash        Chronic systolic CHF (congestive heart failure), NYHA class 4 ([facility])- (present on admission)   Assessment & Plan    Holding at home regimen of lisinopril and carvedilol with underlying GI bleeding  Diuretics on hold with underlying hypertension/shock  Will need close outpatient cardiology follow-up  Resume baseline regimen as clinically appropriate        Type 2 diabetes mellitus with complication, without long-term current use of insulin ([facility])- (present on admission)   Assessment & Plan    Check A1C  Diet controlled         COPD (chronic obstructive pulmonary disease) ([facility])- (present on admission)   Assessment & Plan    Without acute exacerbation  Continue at home regimen of bronchodilators  RT protocol        History of stroke- (present on admission)   Assessment & Plan    AC on hold, resume as able            VTE prophylaxis: Contraindicated, SCDs      This patient is critically ill with a life threatening illness as noted above requiring my direct presence, involvement and maximal preparedness. I have personally spend 90 minutes providing critical care to this patient. Time spend on critical care excludes time spend on procedures. Extensive time spend on floor  stabilizing this patient.

## 2018-07-25 NOTE — ASSESSMENT & PLAN NOTE
Dig amiodarone and carvedilol held for bradycardia  Anticoagulation reversed given GI bleed   GI recommendation is to hold anticoagulation for 1-2 weeks

## 2018-07-25 NOTE — CARE PLAN
Problem: Bronchoconstriction:  Goal: Improve in air movement and diminished wheezing    Intervention: Implement inhaled treatments  DUO Q6  symbicort BID    Appropriate for PRN

## 2018-07-25 NOTE — PROCEDURES
BEDSIDE VENOUS CATHETER INSERTION NOTE    PROCEDURE DATE: 07/24/18   PROCEDURE START TIME: 2130    PRIMARY PROCEDURALIST: Lisbeth Reynoso  ASSISTANT(S): None      INFORMED CONSENT: Informed Consent obtained and on the chart    UNIVERSAL PROTOCOL / SAFETY CHECKLIST  Sign in Communication: Completed    Time Out:  Team Confirms the Correct Patient, Correct Procedure, Correct Site and Site Marking, Correct Position (if applicable), Prep and Dry Time (if applicable).  Time:  2127    Affirmation of Time Out: YES      Sign Out Discussion: Completed    PROCEDURE: VENOUS CATHETER INSERTION  Line Type/Indication: Central Venous 20 cm triple lumen, 7 Fr, non-tunneled, pressure injectable catheter for venous access for vasopressor administration.  Antimicrobial Catheter: Yes  Anesthesia/Sedation: Local; lidocaine 1%  Insertion Site: RIJArea Prep: Chlorhexidine gluconate prep applied.The usual aseptic technique & maximal barrier precautions were used.    Technique Used to Place Line: Modified Seldinger  Ultrasound Used for Insertion: Yes. Vessel easily compressible.  Internal Length: 16.5 cm    Procedure:  The vessel was cannulated under direct ultrasound visualization with a 16 gauge needle on the third attempt given compressibility of IJ.  A 60cm J-tipped spring wire was passed into the vein through the indwelling needle and left in situ. The needle was then removed and the Kiswahili catheter was then introduced over the guidewire. The catheter was left in situ while the guidewire was removed.    Blood return was positive through all lumens of the catheter. The catheter was secured in place with sterile sutures. A sterile dressing was applied and dated. Sterile caps were placed on all ports prior to leaving the procedure area. All ports flushed.   All catheters, needles and/or wires were accounted for and intact: Yes.   Biopatch used: Yes.    Verified Placement: Blood return all ports, Guidewire confirmed in IJ with US and Chest  X-ray ordered and pending.      Patient tolerated procedure well.    Complications: None    No Specimens Collected Unless Noted Here    Estimated Blood Loss: None    Lisbeth Reynoso  07/24/18  10:28 PM

## 2018-07-25 NOTE — PROGRESS NOTES
2100- pt started on dopamine and levo d/t hypotension & bradycardia, patient in 2nd degree HB for 2 min then converted back to SB.  2110- transferred patient to ICU, continued to transfuse blood, patient AA&[id number]  2130-Dr. Husain at bedside placed central and arterial line  0000-patient stabilized after many blood products and pressors, family in room with patient, situation explained to patient family and CT abd is to follow

## 2018-07-25 NOTE — DOCUMENTATION QUERY
DOCUMENTATION QUERY    PROVIDERS: Please select “Cosign w/ note”to reply to query.    To better represent the severity of illness of your patient, please review the following information and exercise your independent professional judgment in responding to this query.     Patient has ongoing anticoagulation with Coumadin, their INR is superatherapeutic on presentation and presents with an acute GI bleed is documented in the History and Physical. Based upon the clinical findings, risk factors, and treatment, can a diagnosis/relationship be provided to support these findings?    · Bleed related to hemorrhagic disorder due extrinsic circulating anticoagulants  · Bleed unrelated to hemorrhagic disorder due to extrinsic circulating anticoagulants  · Other explanation of clinical findings  · Unable to determine    The medical record reflects the following:   Clinical Findings Per H/P: Ongoing anticoagulation with Coumadin, supratherapeutic INR on presentation, hold with underlying profuse hemorrhage/underlying GI bleed  Lab findings: INR 3.43 on 7/24   Treatment 7/24: 4 units of FFP infused, Factor VII, and vitamin K   Risk Factors Paroxysmal A-fib, coumadin   Location within medical record  History and Physical and Lab Results      Thank you,   Linden Gregory RN  Clinical Documentation Improvement  [phone]

## 2018-07-25 NOTE — DISCHARGE PLANNING
Medical SW    Sw attended AM IDT Rounds.    RN reports, pt transfer laterally last night, last BM today, colonoscopy completed this AM, has jenkins, not mobilizing, 2 LPM NC      Plan: Sw to assist w/ d/c planning as needed.

## 2018-07-25 NOTE — ASSESSMENT & PLAN NOTE
Underwent coronary artery bypass graft in December 2017  Aspirin on hold with underlying GI bleeding  Coumadin on hold with underlying GI bleeding    Continue statin  If blood pressure remains stable consider resuming low-dose ACE inhibitor  Beta-blocker on hold given bradycardia and recent shock  Cardiology following

## 2018-07-25 NOTE — PROGRESS NOTES
Patient transferred to [facility] with MIN Jha, on zoll, multiple RN's. Dr. Reynoso updated about patient condition. All belongings sent with patient.

## 2018-07-25 NOTE — CONSULTS
Critical Care/Pulmonary Consultation    Date of service: 7/24/2018    Consulting Physician: Lisbeth Reynoso M.D.    Chief Complaint: GI bleed    History of Present Illness: Joshua Medrano is a 52 y.o. male with a past medical history of ischemic cardiomyopathy with ejection fraction 25-30% on aspirin, paroxysmal atrial fibrillation on Coumadin, presented to the hospital complaining of a new onset of painless bloody bowel movements starting today accompanied by hypotension.  Following ICU admission, ICU consult was requested.  While in the ICU, developed another bout of bloody bowel movement accompanied by significant hypotension and bradycardia requiring vasopressive support.  On my encounter with the patient who concurs with a history of present illness.  He denies hematemesis, abdominal pain, chest pain, melena, nausea, vomiting, chills or fevers.  INR on admission he has received 2 units of FFP's, vitamin K, factor VII, 3 units of blood, and 1 unit of platelets pending.  GI consult requested CT abdomen to evaluate for the source of GI bleeding.    ROS: As per HPI, otherwise all systems been reviewed and were negative.    No current facility-administered medications on file prior to encounter.      Current Outpatient Prescriptions on File Prior to Encounter   Medication Sig Dispense Refill   • ipratropium-albuterol (DUONEB) 0.5-2.5 (3) MG/3ML nebulizer solution 3 mL by Nebulization route every 6 hours as needed for Shortness of Breath. 60 Bullet 6   • budesonide-formoterol (SYMBICORT) 80-4.5 MCG/ACT Aerosol Inhale 2 Puffs by mouth 2 Times a Day. 2 Inhaler 6   • carvedilol (COREG) 6.25 MG Tab Take 1 Tab by mouth 2 times a day, with meals. 60 Tab 11   • furosemide (LASIX) 20 MG Tab Take 1 Tab by mouth every day. 30 Tab 11   • warfarin (COUMADIN) 5 MG Tab Take 5-10 mg by mouth every day. 5 mg on Mondays and wednesdays  10 mg all other days     • amiodarone (CORDARONE) 200 MG Tab Take 1 Tab by mouth every day. 90 Tab 3    • digoxin (LANOXIN) 125 MCG Tab Take 1 Tab by mouth every day at 6 PM. 90 Tab 3   • rosuvastatin (CRESTOR) 40 MG tablet Take 1 Tab by mouth every evening. 90 Tab 3   • spironolactone (ALDACTONE) 50 MG Tab Take 1 Tab by mouth every day. 90 Tab 3   • aspirin (ASA) 81 MG Chew Tab chewable tablet Take 1 Tab by mouth every day. 90 Tab 3   • albuterol 108 (90 Base) MCG/ACT Aero Soln inhalation aerosol Inhale 2 Puffs by mouth every 6 hours as needed for Shortness of Breath. 1 Inhaler 10       Social History   Substance Use Topics   • Smoking status: Former Smoker     Packs/day: 0.50     Years: 30.00     Types: Cigarettes     Quit date: 12/13/2017   • Smokeless tobacco: Never Used      Comment: 1/2-1.5 packs for 30 years   • Alcohol use No        Past Medical History:   Diagnosis Date   • ASTHMA    • Atrial fibrillation (HCC)    • CAD (coronary artery disease)    • Chronic obstructive pulmonary disease (HCC)    • Diabetes        Past Surgical History:   Procedure Laterality Date   • THORACOSCOPY Left 1/25/2018    Procedure: THORACOSCOPY- PLEURODESIS ;  Surgeon: Chandu Paez M.D.;  Location: SURGERY [facility];  Service: General   • MULTIPLE CORONARY ARTERY BYPASS ENDO VEIN HARVEST  12/22/2017    Procedure: MULTIPLE CORONARY ARTERY BYPASS ENDO VEIN HARVEST X2;  Surgeon: Kiel Ash M.D.;  Location: SURGERY [facility];  Service: Cardiothoracic   • JIM  12/22/2017    Procedure: JIM;  Surgeon: Kiel Ash M.D.;  Location: SURGERY [facility];  Service: Cardiothoracic   • OTHER ABDOMINAL SURGERY         Allergies: Erythromycin; Cillins [penicillins]; Metoprolol; and Shellfish allergy    Family History   Problem Relation Age of Onset   • Diabetes Mother    • Stroke Mother    • Leukemia Mother    • Diabetes Father    • No Known Problems Sister    • Heart Disease Brother      PPM   • Lung Disease Brother    • Alcohol/Drug Brother    • Diabetes Sister        Vitals:    07/24/18 1509 07/24/18 1510  "07/24/18 1511 07/24/18 1530   Height: 1.956 m (6' 5\")      Weight: (!) 133.4 kg (294 lb) (!) 140 kg (308 lb 10.3 oz)     Weight % change since last entry.: 0 % 4.98 %     BP:   (!) 83/47    Pulse:   (!) 52 (!) 52   BMI (Calculated): 34.86      Resp:   12 17   Temp:   36.4 °C (97.5 °F)     07/24/18 1545 07/24/18 1600 07/24/18 1655 07/24/18 1710   Height:       Weight:       Weight % change since last entry.:       BP:   (!) 96/40 (!) 94/42   Pulse: (!) 52 (!) 52 (!) 52 (!) 55   BMI (Calculated):       Resp: 15 12 18 16   Temp:   36.4 °C (97.5 °F) 36.1 °C (96.9 °F)    07/24/18 1733 07/24/18 1736 07/24/18 1741 07/24/18 1745   Height:       Weight:       Weight % change since last entry.:       BP:  (!) 97/38 (!) 90/43 (!) 92/45   Pulse: (!) 56 (!) 55 (!) 54 (!) 57   BMI (Calculated):       Resp: 18 18 16 16   Temp:  36.3 °C (97.4 °F) 36.3 °C (97.4 °F)     07/24/18 1800 07/24/18 1902 07/24/18 1946 07/24/18 2000   Height:       Weight:  (!) 140 kg (308 lb 10.3 oz)     Weight % change since last entry.:  0 %     BP: (!) 86/43 (!) 99/52 (!) 83/52 (!) 95/53   Pulse: (!) 56 (!) 54 (!) 59    BMI (Calculated):       Resp: 16 18 18    Temp:  36.4 °C (97.6 °F) 36.2 °C (97.1 °F)     07/24/18 2014 07/24/18 2030 07/24/18 2032 07/24/18 2045   Height:       Weight:       Weight % change since last entry.:       BP:  (!) 85/52 (!) 92/49 (!) 84/46   Pulse: 68 (!) 58 (!) 56 (!) 53   BMI (Calculated):       Resp: (!) 22 18 18 20   Temp:  36.2 °C (97.1 °F) 36.2 °C (97.1 °F) 36.2 °C (97.1 °F)    07/24/18 2100 07/24/18 2200 07/24/18 2220 07/24/18 2244   Height:       Weight:       Weight % change since last entry.:       BP: (!) 71/41 148/63 133/48 135/69   Pulse: (!) 50 84 80 78   BMI (Calculated):       Resp: 20 18 16 20   Temp: 36.1 °C (97 °F) 36.2 °C (97.2 °F) 36.1 °C (97 °F) 36.2 °C (97.1 °F)    07/24/18 2245   Height:    Weight:    Weight % change since last entry.:    BP: 154/56   Pulse: 71   BMI (Calculated):    Resp: 15   Temp: " 36.4 °C (97.5 °F)       Physical Examination  General: Obese  Neuro/Psych: CN II-XII grossly within normal limits, alert and oriented ×4, no focal neurological deficits  HEENT: Head is normocephalic, atraumatic, PERRLA, EOMI  CVS: S1 and S2 within normal limits, regular rate, soft precordial systolic murmur, no rubs or gallops, 2+ bilateral lower extremities edema present  Respiratory: Diminished breath sounds bilaterally, symmetric expansion of the chest with respirations, trachea is midline  Abdomen/: Soft, obese, nondistended, nontender and no guarding to palpation  Extremities: No bony deformities, joint swelling, joint erythema  Skin: No skin rashes, bruises, jaundice except bilateral stasis dermatitis in lower extremities    Recent Labs      07/24/18   1520  07/24/18   2023   WBC  18.0*  20.9*   NEUTSPOLYS  78.50*   --    LYMPHOCYTES  7.20*   --    MONOCYTES  5.80   --    EOSINOPHILS  4.70   --    BASOPHILS  0.90   --    ASTSGOT  12   --    ALTSGPT  12   --    ALKPHOSPHAT  77   --    TBILIRUBIN  0.4   --      Recent Labs      07/24/18   1520   SODIUM  138   POTASSIUM  4.6   CHLORIDE  110   CO2  22   BUN  22   CREATININE  1.38   CALCIUM  8.4*     Recent Labs      07/24/18   1520   ALTSGPT  12   ASTSGOT  12   ALKPHOSPHAT  77   TBILIRUBIN  0.4   LIPASE  40   GLUCOSE  127*           Invalid input(s): [id number]  DX-CHEST-PORTABLE (1 VIEW)   Final Result         1.  Cardiomegaly with possible mild central pulmonary vascular congestion.      DX-CHEST-PORTABLE (1 VIEW)   Final Result      No acute cardiopulmonary abnormality.      CTA ABDOMEN PELVIS W & W/O POST PROCESS    (Results Pending)       Assessment and Plan:    1.  GI bleed:  -On Coumadin with therapeutic INR and aspirin  -Status post FFP's, factor VII, vitamin K, PRBCs, platelet  Transfusion  -Series of CBCs and INR  -GI is following  -CT abdomen is pending  -Protonix drip    2.  Hypotension  -Due to GI bleed  -Pre-existing systolic dysfunction with  ejection fraction 25%  -Bradycardia  -IV Levophed and dopamine, wean off per map equal or greater 65 mmHg  -Cardiology is following    3.  Leukocytosis  -Stool C. difficile pending  -Empiric Flagyl for now  -Likely stress induced  -Daily monitoring WBC trend    4.  Paroxysmal atrial fibrillation  -Presently in sinus rhythm    5.  History of ischemic cardiomyopathy with ejection fraction 25-30%  -Stable  -As needed Lasix for worsening hypoxemia    6.  History of chronic kidney disease, stage III  -Monitoring renal function and urine output    7.  ICU prophylaxis  -Protonix and SCDs    Family meeting: Not available at bedside    Critical care time: I spent 30 minutes personally providing critical care services including formulating plan of care.  This time was exclusive to this patient and does not include time spent in procedures.

## 2018-07-25 NOTE — CONSULTS
DATE OF SERVICE:  07/24/2018    HISTORY OF PRESENT ILLNESS:  The patient is a pleasant 52-year-old male with a   history of coronary artery disease as well as COPD who presents with a 1-day   history of bright red blood per rectum.  The patient reported it started this   morning and has happened multiple times.  The patient denies any repeat   bleeding since the ambulance took him to the hospital.  The patient has had   none in ER.  Patient denies any nausea, vomiting, fever, chills, chest pain,   shortness of breath, abdominal pain, melanotic stools.  Patient did have   diarrhea initially; however, has not had any diarrhea since then.  Patient has   never had a colonoscopy.    PAST MEDICAL HISTORY:  Atrial fibrillation, coronary artery disease, asthma,   COPD, and melanoma.    PAST SURGICAL HISTORY:  Coronary artery bypass grafting, melanoma excision,   hernia repair and a lung surgery.    ALLERGIES:  TO ERYTHROMYCIN AND PENICILLIN.    FAMILY HISTORY:  Noncontributory.    SOCIAL HISTORY:  The patient denies alcohol or illicit drug use.  The patient   does admit to tobacco use.    REVIEW OF SYSTEMS:  A 14-point review of systems was obtained and negative   except as noted in HPI.    PHYSICAL EXAMINATION:  GENERAL:  No acute distress, alert, awake, and oriented x3.  VITAL SIGNS:  Stable, although mildly tachycardic.  HEENT:  The patient has conjunctival pallor.  HEART:  Regular rate and rhythm.  Systolic murmur auscultated.  LUNGS:  Clear to auscultation bilaterally.  ABDOMEN:  Soft, nontender, no guarding or rebound.  MUSCULOSKELETAL:  No edema noted, bilateral lower extremities.  NEUROLOGIC:  Grossly intact.  PSYCHIATRIC:  Normal.  SKIN:  Positive pallor noted.    RESULTS:  Hemoglobin 8.4, hematocrit 30.7.  White count 18, platelets 408.    Sodium 138, potassium 4.6, BUN 22, creatinine 1.38, AST 12, ALT 12, alk phos   77, total bilirubin 0.4, albumin 3.4, lipase 40.    ASSESSMENT AND PLAN:  1.  Lower  gastrointestinal bleed, likely diverticular source.  However, the   patient has never had a colonoscopy.  We will plan to prep the patient for   colonoscopy.  We will give GoLYTELY this evening, if the patient is able to   tolerate it.  If the patient worsens, we will plan to do a TIBC scan versus CT   angio.  Patient is stable to have an exam in the emergency room.  We will   make further recommendations based on results of colonoscopy.  2.  Anemia, see above.  Transfuse for any hemoglobin less than 7 or if patient   becomes unstable.  3.  Coagulopathy.  Hold Coumadin.  Recommend reversing INR prior to   colonoscopy.    Please call if you have any questions or concerns.       ____________________________________     DO ELISE Waller / YOSVANY    DD:  07/24/2018 21:41:36  DT:  07/24/2018 22:41:08    D#:  [id number]  Job#:  [id number]

## 2018-07-25 NOTE — PROGRESS NOTES
Cardiology Progress Note               Author: Angelica Adam Date & Time created: 2018  8:08 AM     Interval History:  Still SOB, now on dopamine and levo, blood transfusion fpr hgb 6.7-> to 7.4. On pressors now, mild pulm edema on cxr yesterday, last EF 25%, I ordered limited echo and was present, EF generally looks good, ~50-55% on dopamine 10, full study to follow. IVC small, collapses, no TR to get gradient. Just had C scope, old blood, diverticular bleeding, none active.    Data reviewed by me personally:  Current medications  Tele- sinus, intermittent 2:1 block  ECG 18 NSR, 73, delayed R wave progression, LAFB, nonspecific st-t wave changes anterolateral leads  Echo  EF 25-30%, mild AI, MR, dilated RV, no RVSP  CT Abd- poss mesenteric adenitis  CXR 18 mild pulm edema, cardiomegaly    FH/SH reviewed/unchanged.    Chief Complaint:  Hypotension, bradyarrhythmia    Review of Systems   Constitutional: Negative for chills and fever.   Skin: Negative for itching and rash.       Physical Exam   General: No acute distress. Well nourished.  HEENT: EOM grossly intact, no scleral icterus, no pharyngeal erythema.   Neck:  No JVD, no bruits, trachea midline  CVS: RRR. Normal S1, S2. No M/R/G. No LE edema.  2+ radial pulses, 2+ PT pulses  Resp: CTAB. No wheezing or crackles/rhonchi. Normal respiratory effort.  Abdomen: Soft, NT, no joel hepatomegaly.  MSK/Ext: No clubbing or cyanosis.  Skin: Warm and dry, no rashes.  Neurological: CN III-XII grossly intact. No focal deficits.   Psych: A&O x 3, appropriate affect, good judgement      Hemodynamics:  Temp (24hrs), Av.2 °C (97.2 °F), Min:36.1 °C (96.9 °F), Max:36.4 °C (97.6 °F)  Temperature: 36.4 °C (97.5 °F), Monitored Temp: 37.3 °C (99.14 °F)  Pulse  Av  Min: 44  Max: 84Heart Rate (Monitored): 65  Blood Pressure: 154/56, Arterial BP: 119/44, NIBP: (!) 98/33     Respiratory:    Respiration: 14, Pulse Oximetry: 100 %, O2 Daily Delivery  Respiratory : Silicone Nasal Cannula     Given By:: Mouthpiece, Work Of Breathing / Effort: Mild  RUL Breath Sounds: Clear;Diminished, RML Breath Sounds: Clear;Diminished, RLL Breath Sounds: Clear;Diminished, GENNARO Breath Sounds: Clear;Diminished, LLL Breath Sounds: Clear;Diminished  Fluids:  Date 07/25/18 0700 - 07/26/18 0659   Shift 2640-2574 [id number] 3837-1718 24 Hour Total   I  N  T  A  K  E   Blood 600   600    Shift Total 600   600   O  U  T  P  U  T   Shift Total       Weight (kg) 140 140 140 140       Weight: (!) 140 kg (308 lb 10.3 oz)  GI/Nutrition:  Orders Placed This Encounter   Procedures   • Diet Order Cardiac, Clear Liquid     Standing Status:   Standing     Number of Occurrences:   1     Order Specific Question:   Diet:     Answer:   Cardiac [6]     Order Specific Question:   Diet:     Answer:   Clear Liquid [10]     Lab Results:  Recent Labs      07/24/18 1520 07/24/18 2023 07/24/18 2150 07/25/18   0354   WBC  18.0*  20.9*   --   19.0*   RBC  4.82  3.83*   --   3.72*   HEMOGLOBIN  8.4*  6.7*  7.3*  7.4*   HEMATOCRIT  30.7*  24.5*  26.5*  24.7*   MCV  63.7*  64.0*   --   66.4*   MCH  17.4*  17.5*   --   19.9*   MCHC  27.4*  27.3*   --   30.0*   RDW  49.3  50.1*   --   56.7*   PLATELETCT  408  389   --   357   MPV  9.6  10.2   --   9.5     Recent Labs      07/24/18 1520 07/25/18   0354   SODIUM  138  142   POTASSIUM  4.6  4.2   CHLORIDE  110  112   CO2  22  24   GLUCOSE  127*  110*   BUN  22  23*   CREATININE  1.38  1.10   CALCIUM  8.4*  7.9*     Recent Labs      07/24/18 1520 07/24/18 2023 07/25/18   0354   APTT  42.2*   --    --    INR  3.43*  2.05*  1.35*         Recent Labs      07/25/18   0354   TROPONINI  0.05*             Medical Decision Making, by Problem:  Active Hospital Problems    Diagnosis   • Hemorrhagic shock (HCC) [R57.8]   • Acute lower GI bleeding [K92.2]   • Bradycardia [R00.1]   • Cigarette nicotine dependence with nicotine-induced disorder [F17.219]   • CKD  (chronic kidney disease), stage II [N18.2]   • Dyslipidemia [E78.5]   • SHASHANK (obstructive sleep apnea) [G47.33]   • Class 2 severe obesity due to excess calories with serious comorbidity and body mass index (BMI) of 36.0 to 36.9 in adult ([facility]) [E66.01, Z68.36]   • Essential hypertension [I10]   • Paroxysmal atrial fibrillation ([facility]) [I48.0]   • Normocytic anemia [D64.9]   • Leukocytosis [D72.829]   • CAD (coronary artery disease) [I25.10]   • Chronic systolic CHF (congestive heart failure), NYHA class 4 ([facility]) [I50.22]   • Type 2 diabetes mellitus with complication, without long-term current use of insulin ([facility]) [E11.8]   • COPD (chronic obstructive pulmonary disease) ([facility]) [J44.9]   • History of stroke [Z86.73]       Plan:  1.  Lower gastrointestinal bleed, diverticular bleed, old blood, nothing to fix. 4 Units PRBCs I believe.  2.  Paroxysmal atrial fibrillation, in sinus, no anticoagulation due to #1  3.  CAD, s/p CABG with a complicated postoperative course  in 12/2017. LIMA to the LAD and a saphenous vein graft to the marginal  branch of the circumflex.  4. Ischemic cardiomyopathy, [facility] 2, EF 25%. EF at bedside looks generally normal on dopamine 10  5. Abnormal troponin, similar to prior in March 2018, not concerning  6.  Dyslipidemia.  7. Anemia, due to #1  8. Intermittent heart block, 2;1, narrow QRS, appears to have resolved, was sinus seymour prior, possible vagal response.  9. Chronic anticoagulation with coumadin, currently contraindicated  10. Shock, low volume likely    -Difficult situation with mild CHF, hypotension and shock  -On crestor only at this point for CAD  -Blood thinner contraindicated at this time  -I do not think the intermittent heart block is contributing at this point, monitor for recurrence.  -Limited echo for LV function, full report pending, was on dopamine 10 at the time  -DW Dr. Reynoso and Dr. Banegas      Quality-Core Measures   Reviewed items::  EKG reviewed, Labs reviewed, Medications  reviewed and Radiology images reviewed

## 2018-07-25 NOTE — DOCUMENTATION QUERY
DOCUMENTATION QUERY    PROVIDERS: Please select “Cosign w/ note” to reply to query.    To better represent the severity of illness of your patient, please review the following information and exercise your independent professional judgment in responding to this query.     Acute GI bleed, hemorrhagic shock, and multiple blood transfusions are documented in the Progress Notes. Based upon the clinical findings, risk factors, and treatment, can a diagnosis be provided to support this finding?    • Acute blood loss anemia  • Chronic blood loss anemia  • Other type of anemia (please specify type)  • Other explanation of clinical findings  • Unable to determine     The medical record reflects the following:   Clinical Findings · Per H/P: Acute lower GI bleeding, hematochezia and hemorrhagic shock, orders written for 2 units of stat PRBC is documented.  · Hgb range between 7/24-7/25: 8.4--6.7   Treatment  Cardiac intensive care monitoring, multiple blood transfusions, reversal of supratherapeutic INR, central lines,    Risk Factors GI bleed, Coumadin, normocytic anemia    Location within medical record  History and Physical and Lab Results      Thank you,   Linden Gregory RN  Clinical Documentation Improvement  [phone]

## 2018-07-25 NOTE — ASSESSMENT & PLAN NOTE
Not formally diagnosed per patient  Intermittently uses nighttime oxygen  Recommend outpatient pulmonology/sleep clinic evaluation

## 2018-07-25 NOTE — ASSESSMENT & PLAN NOTE
No active bleeding on colonoscopy   Likely diverticular bleed     Transfuse to keep hemoglobin greater than 7  If rebleeds will need nuclear medicine scan to identify site of bleeding

## 2018-07-25 NOTE — PROGRESS NOTES
[facility] Hospitalist Progress Note    Date of Service: 2018    Chief Complaint  52 y.o. male admitted 2018 with GIB and shock    Interval Problem Update  SR   On dopamine at 10  No active bleeding on c-scope  EF 55%            Consultants/Specialty  GI  Cardiology  Critical care    Disposition  ICU monitoring        Review of Systems   Constitutional: Negative for fever and malaise/fatigue.   HENT: Negative for congestion, ear discharge and nosebleeds.    Eyes: Negative for blurred vision, pain, discharge and redness.   Respiratory: Negative for cough, hemoptysis, sputum production, shortness of breath and stridor.    Cardiovascular: Negative for chest pain, palpitations, orthopnea and leg swelling.   Gastrointestinal: Positive for blood in stool. Negative for abdominal pain, diarrhea, melena, nausea and vomiting.   Genitourinary: Negative for dysuria, flank pain and hematuria.   Musculoskeletal: Negative for back pain, falls, joint pain and neck pain.   Skin: Negative.    Neurological: Positive for dizziness. Negative for speech change, focal weakness, seizures, loss of consciousness, weakness and headaches.   Psychiatric/Behavioral: Negative for hallucinations, memory loss and substance abuse. The patient is not nervous/anxious.    All other systems reviewed and are negative.     Physical Exam  Laboratory/Imaging   Hemodynamics  Temp (24hrs), Av.3 °C (97.4 °F), Min:36.1 °C (96.9 °F), Max:37.3 °C (99.1 °F)   Temperature: 37.3 °C (99.1 °F), Monitored Temp: 37.3 °C (99.1 °F)  Pulse  Av.9  Min: 44  Max: 84 Heart Rate (Monitored): 77  Blood Pressure: 113/39, Arterial BP: (!) 99/59, NIBP: (!) 98/33 CVP (mm Hg): (!) 10 MM HG    Respiratory      Respiration: 15, Pulse Oximetry: 97 %, O2 Daily Delivery Respiratory : Silicone Nasal Cannula     Given By:: Mouthpiece, Work Of Breathing / Effort: Mild  RUL Breath Sounds: Clear;Diminished, RML Breath Sounds: Clear;Diminished, RLL Breath Sounds:  Clear;Diminished, GENNARO Breath Sounds: Clear;Diminished, LLL Breath Sounds: Clear;Diminished    Fluids    Intake/Output Summary (Last 24 hours) at 07/25/18 1001  Last data filed at 07/25/18 0715   Gross per 24 hour   Intake          1981.63 ml   Output             1000 ml   Net           981.63 ml       Nutrition  Orders Placed This Encounter   Procedures   • Diet Order Cardiac, Clear Liquid     Standing Status:   Standing     Number of Occurrences:   1     Order Specific Question:   Diet:     Answer:   Cardiac [6]     Order Specific Question:   Diet:     Answer:   Clear Liquid [10]     Physical Exam   Constitutional: He is oriented to person, place, and time. He appears well-developed and well-nourished.   HENT:   Head: Normocephalic and atraumatic.   Right Ear: External ear normal.   Left Ear: External ear normal.   Mouth/Throat: No oropharyngeal exudate.   Eyes: Conjunctivae are normal. Right eye exhibits no discharge. Left eye exhibits no discharge. No scleral icterus.   Neck: Neck supple. No JVD present. No tracheal deviation present.   Cardiovascular: Normal rate and regular rhythm.  Exam reveals no gallop and no friction rub.    Murmur heard.  Pulmonary/Chest: Effort normal and breath sounds normal. No stridor. No respiratory distress. He has no wheezes. He has no rales. He exhibits no tenderness.   Abdominal: Soft. Bowel sounds are normal. He exhibits no distension and no mass. There is no tenderness. There is no rebound and no guarding.   Musculoskeletal: He exhibits edema. He exhibits no tenderness.   Neurological: He is alert and oriented to person, place, and time. No cranial nerve deficit. He exhibits normal muscle tone.   Skin: Skin is warm and dry. He is not diaphoretic. No cyanosis. Nails show no clubbing.   Psychiatric: He has a normal mood and affect. His behavior is normal. Thought content normal.   Nursing note and vitals reviewed.      Recent Labs      07/24/18   1520  07/24/18 2023 07/24/18    2150  07/25/18   0354  07/25/18   0800   WBC  18.0*  20.9*   --   19.0*   --    RBC  4.82  3.83*   --   3.72*   --    HEMOGLOBIN  8.4*  6.7*  7.3*  7.4*  7.0*   HEMATOCRIT  30.7*  24.5*  26.5*  24.7*  23.1*   MCV  63.7*  64.0*   --   66.4*   --    MCH  17.4*  17.5*   --   19.9*   --    MCHC  27.4*  27.3*   --   30.0*   --    RDW  49.3  50.1*   --   56.7*   --    PLATELETCT  408  389   --   357   --    MPV  9.6  10.2   --   9.5   --      Recent Labs      07/24/18   1520  07/25/18   0354   SODIUM  138  142   POTASSIUM  4.6  4.2   CHLORIDE  110  112   CO2  22  24   GLUCOSE  127*  110*   BUN  22  23*   CREATININE  1.38  1.10   CALCIUM  8.4*  7.9*     Recent Labs      07/24/18   1520  07/24/18 2023 07/25/18   0354   APTT  42.2*   --    --    INR  3.43*  2.05*  1.35*                  Assessment/Plan     Hemorrhagic shock (HCC)- (present on admission)   Assessment & Plan    Secondary to lower GI bleed  Wean off dopamine as tolerated   Transfuse 1 unit packed RBC and monitor H&H             Bradycardia- (present on admission)   Assessment & Plan    Resolved  Continue telemetry monitoring  Discussed with cardiology        Acute lower GI bleeding- (present on admission)   Assessment & Plan    Discussed with Dr Molina   No active bleeding on colonoscopy   Likely diverticular bleed     Transfuse to keep hemoglobin greater than 7  If rebleeds will need nuclear medicine scan to identify site of bleeding            Class 2 severe obesity due to excess calories with serious comorbidity and body mass index (BMI) of 36.0 to 36.9 in adult (HCC)- (present on admission)   Assessment & Plan    Body mass index is 36.6 kg/m².        Dyslipidemia- (present on admission)   Assessment & Plan    Continue Crestor        CKD (chronic kidney disease), stage II- (present on admission)   Assessment & Plan    Stable monitor        Cigarette nicotine dependence with nicotine-induced disorder- (present on admission)   Assessment & Plan    Counseled  on cessation        Paroxysmal atrial fibrillation (HCC)- (present on admission)   Assessment & Plan    Dig amiodarone and carvedilol held for bradycardia  Anticoagulation reversed given GI bleed  Discussed with GI recommendation is to hold anticoagulation for 1-2 weeks        Normocytic anemia- (present on admission)   Assessment & Plan    Secondary to acute blood loss    Transfuse 1 unit packed RBC and monitor H&H        Leukocytosis- (present on admission)   Assessment & Plan    Chronic    Monitor clinically        CAD (coronary artery disease)- (present on admission)   Assessment & Plan    Underwent coronary artery bypass graft in December 2017  Aspirin on hold with underlying GI bleeding  Coumadin on hold with underlying GI bleeding  Beta-blockers on hold with underlying hypotension / bradycardia     Continue statin          Chronic systolic CHF (congestive heart failure), NYHA class 4 ([facility])- (present on admission)   Assessment & Plan    Monitor fluid status  No signs of acute exacerbation at this time        Type 2 diabetes mellitus with complication, without long-term current use of insulin ([facility])- (present on admission)   Assessment & Plan    Diet controlled   HbA1c 5.9        COPD (chronic obstructive pulmonary disease) ([facility])- (present on admission)   Assessment & Plan    Without acute exacerbation    Continue Symbicort bronchodilators as needed          History of stroke- (present on admission)   Assessment & Plan    AC on hold, resume as able          Quality-Core Measures   Reviewed items::  Labs reviewed, Medications reviewed and Radiology images reviewed  Ceballos catheter::  Critically Ill - Requiring Accurate Measurement of Urinary Output  Central line in place:  Shock  DVT prophylaxis pharmacological::  Contraindicated - High bleeding risk  DVT prophylaxis - mechanical:  SCDs  Ulcer Prophylaxis::  Yes      Plan of care reviewed with patient and discussed with cardiology and critical care and GI.

## 2018-07-25 NOTE — PROGRESS NOTES
Pulmonary Critical Care Progress Note        Date of Admission:  7/24/2018  3:07 PM    Chief Complaint: GIB, hypotension    History of Present Illness: 52 y.o. male with a past medical history of ischemic cardiomyopathy with ejection fraction 25-30% on aspirin, paroxysmal atrial fibrillation on Coumadin, presented to the hospital complaining of a new onset of painless bloody bowel movements starting today accompanied by hypotension.  Following ICU admission, ICU consult was requested.  While in the ICU, developed another bout of bloody bowel movement accompanied by significant hypotension and bradycardia requiring vasopressive support.  On my encounter with the patient who concurs with a history of present illness.  He denies hematemesis, abdominal pain, chest pain, melena, nausea, vomiting, chills or fevers.  INR 3.43 on admission he has received 2 units of FFP's, vitamin K, factor VII, 3 units of blood, and 1 unit of platelets pending.  GI consult requested CT abdomen to evaluate for the source of GI bleeding.    ROS:  Respiratory: negative, Cardiac: negative, GI: negative.  All other systems negative.    Interval Events:  24 hour interval history reviewed    - RRT last night for hypotension and SB 30's   - a/o x 4   - SR/60's, on dopamine 10   - no further heart block this am   - colonoscopy this am   - afebrile   - mild red BM   - PPI gtt   - CXR 7/24 mild edema   - likely diverticular bleed   - EF better today 50%    PFSH:  No change.    Respiratory:     Pulse Oximetry: 96 %  Chest Tube Drains:          Exam: unlabored respirations, no intercostal retractions or accessory muscle use and diminished breath sounds moderate  ImagingAvailable data reviewed         Invalid input(s): [id number]    HemoDynamics:  Pulse: 76, Heart Rate (Monitored): 76  Blood Pressure: 113/39, Arterial BP: (!) 99/47, NIBP: (!) 98/33  CVP (mm Hg): (!) 7 MM HG    Exam: Sinus rhythm/sinus bradycardia on dopamine infusion, regular, no  murmur  Imaging: Available data reviewed  Recent Labs      07/25/18   0354  07/25/18   0800   TROPONINI  0.05*  0.05*       Neuro:  GCS         Exam: no focal deficits noted  Imaging: Available data reviewed    Fluids:  Intake/Output       07/23/18 0700 - 07/24/18 0659 (Not Admitted) 07/24/18 0700 - 07/25/18 0659 07/25/18 0700 - 07/26/18 0659      0700-1859 1900-0659 Total 0700-1859 1900-0659 Total 0700-1859 1900-0659 Total       Intake    I.V.  --  -- --  --  564.2 564.2  --  -- --    Norepinephrine Volume -- -- -- -- 198.5 198.5 -- -- --    Dopamine Volume -- -- -- -- 265.8 265.8 -- -- --    IV Piggyback Volume (IV Piggyback) -- -- -- -- 100 100 -- -- --    Blood  --  -- --  484.4  333 817.4  600  -- 600    Volume (RELEASE FRESH FROZEN PLASMA) -- -- -- 195.4 -- 195.4 -- -- --    Volume (RELEASE FRESH FROZEN PLASMA) -- -- -- 289 -- 289 -- -- --    Volume (RELEASE RED BLOOD CELLS) -- -- -- -- -- -- 300 -- 300    Volume (RELEASE FRESH FROZEN PLASMA) -- -- -- -- 333 333 -- -- --    Volume (RELEASE RED BLOOD CELLS-ACTIVE BLEEDING) -- -- -- -- -- -- 300 -- 300    Total Intake -- -- -- 484.4 897.2 1381.6 600 -- 600       Output    Stool  --  -- --  --  1000 1000  --  -- --    Number of Times Stooled -- -- -- -- 7 x 7 x -- -- --    Measurable Stool (mL) -- -- -- -- 1000 1000 -- -- --    Total Output -- -- -- -- 1000 1000 -- -- --       Net I/O     -- -- -- 484.4 -102.8 381.6 600 -- 600        Weight: (!) 140 kg (308 lb 10.3 oz)  Recent Labs      07/24/18   1520  07/25/18   0354   SODIUM  138  142   POTASSIUM  4.6  4.2   CHLORIDE  110  112   CO2  22  24   BUN  22  23*   CREATININE  1.38  1.10   MAGNESIUM   --   1.8   PHOSPHORUS   --   2.9   CALCIUM  8.4*  7.9*       GI/Nutrition:  Exam: abdomen is soft and non-tender  Imaging: Available data reviewed  NPO  Liver Function  Recent Labs      07/24/18   1520  07/25/18   0354   ALTSGPT  12  10   ASTSGOT  12  12   ALKPHOSPHAT  77  62   TBILIRUBIN  0.4  0.7   LIPASE  40   --     GLUCOSE  127*  110*       Heme:  Recent Labs      18   1520  18   2150  18   0354  18   0800   RBC  4.82  3.83*   --   3.72*   --    HEMOGLOBIN  8.4*  6.7*  7.3*  7.4*  7.0*   HEMATOCRIT  30.7*  24.5*  26.5*  24.7*  23.1*   PLATELETCT  408  389   --   357   --    PROTHROMBTM  34.3*  22.8*   --   16.4*   --    APTT  42.2*   --    --    --    --    INR  3.43*  2.05*   --   1.35*   --    IRON  13*   --    --    --    --    FERRITIN  10.0*   --    --    --    --    TOTIRONBC  304   --    --    --    --        Infectious Disease:  Monitored Temp 2  Av.2 °C (98.9 °F)  Min: 37 °C (98.6 °F)  Max: 37.3 °C (99.14 °F)  Temp  Av.3 °C (97.4 °F)  Min: 36.1 °C (96.9 °F)  Max: 37.3 °C (99.1 °F)  Micro: reviewed  Recent Labs      18   15218   0354   WBC  18.0*  20.9*  19.0*   NEUTSPOLYS  78.50*   --   80.40*   LYMPHOCYTES  7.20*   --   6.50*   MONOCYTES  5.80   --   5.50   EOSINOPHILS  4.70   --   1.90   BASOPHILS  0.90   --   0.80   ASTSGOT  12   --   12   ALTSGPT  12   --   10   ALKPHOSPHAT  77   --   62   TBILIRUBIN  0.4   --   0.7     Current Facility-Administered Medications   Medication Dose Frequency Provider Last Rate Last Dose   • SODIUM CHLORIDE 0.9 % IV SOLN           • SODIUM CHLORIDE 0.9 % IV SOLN           • NS (BOLUS) infusion 500 mL  500 mL Once PRN Josiah Molina D.O.       • fentaNYL (SUBLIMAZE) injection 12.5-50 mcg  12.5-50 mcg PRN Josiah Molina D.O.       • midazolam (VERSED) injection 0.5-2 mg  0.5-2 mg PRN Josiah Molina D.O.       • albuterol inhaler 2 Puff  2 Puff Q6HRS PRN (RT) Lisbeth Reynoso M.D.       • budesonide-formoterol (SYMBICORT) 80-4.5 MCG/ACT inhaler 2 Puff  2 Puff BID (RT) Lisbeth Reynoso M.D.   Stopped at 18   • rosuvastatin (CRESTOR) tablet 40 mg  40 mg Q EVENING Lisbeth Reynoso M.D.   40 mg at 18   • ipratropium-albuterol (DUONEB) nebulizer solution  3 mL Q6HRS (RT) Lisbeth Reynoso M.D.   3 mL at  07/25/18 0716   • Respiratory Care per Protocol   Continuous RT Lisbeth Reynoso M.D.       • ondansetron (ZOFRAN) syringe/vial injection 4 mg  4 mg Q4HRS PRN Lisbeth Reynoso M.D.       • ondansetron (ZOFRAN ODT) dispertab 4 mg  4 mg Q4HRS PRN Lisbeth Reynoso M.D.       • nicotine (NICODERM) 14 MG/24HR 14 mg  14 mg Daily-0600 Lisbeth Reynoso M.D.        And   • nicotine polacrilex (NICORETTE) 2 MG piece 2 mg  2 mg Q HOUR PRN Lisbeth Reynoso M.D.       • acetaminophen (TYLENOL) tablet 650 mg  650 mg Q6HRS PRN Lisbeth Reynoso M.D.       • norepinephrine (LEVOPHED) 8 mg in  mL Infusion  0-30 mcg/min Continuous Lisbeth Reynoso M.D.   Stopped at 07/25/18 0600   • pantoprazole (PROTONIX) 80 mg in  mL Infusion  8 mg/hr Continuous Lisbeth Reynoso M.D. 25 mL/hr at 07/24/18 2238 8 mg/hr at 07/24/18 2238   • iron sucrose (VENOFER) injection 200 mg  200 mg DAILY Cesar Basurto M.D.   200 mg at 07/25/18 0622   • DOPamine 1600 mcg/mL in D5W premix  0-20 mcg/kg/min Continuous Lisbeth Reynoso M.D. 31.5 mL/hr at 07/25/18 0854 6 mcg/kg/min at 07/25/18 0854   • metroNIDAZOLE (FLAGYL) IVPB 500 mg  500 mg Q8HRS Cesar Basurto M.D.   Stopped at 07/25/18 0722     Last reviewed on 7/24/2018  4:41 PM by Vinny Williamson    Quality  Measures:  Labs reviewed, Medications reviewed and Radiology images reviewed                          Assessment / Plan  1.  GI bleed:  -On Coumadin with therapeutic INR and aspirin  -Status post FFP's, factor VII, vitamin K, PRBCs, platelet  Transfusion  -Series of CBCs and INR  -GI is following  -CT abdomen is pending  -Protonix drip     2.  Hypotension  -Due to GI bleed  -Pre-existing systolic dysfunction with ejection fraction 25%  -Bradycardia  -IV Levophed and dopamine, wean off per map equal or greater 65 mmHg  -Cardiology is following     3.  Leukocytosis  -Stool C. difficile pending  -Empiric Flagyl for now  -Likely stress induced  -Daily monitoring WBC trend     4.  Paroxysmal atrial fibrillation  -Presently in  sinus rhythm     5.  History of ischemic cardiomyopathy with ejection fraction 25-30%  -Stable  -As needed Lasix for worsening hypoxemia     6.  History of chronic kidney disease, stage III  -Monitoring renal function and urine output     7.  ICU prophylaxis  -Protonix and SCDs  Patient just underwent colonoscopy.  Old blood was noted with no evidence of active bleeding.  Suspected diverticular bleed.  Okay to advance diet now, will wean down dopamine as tolerated today.  Discussed patient condition and risk of morbidity and/or mortality with RN, RT and QA team.    The patient remains critically ill.  Critical care time = 32 minutes in directly providing and coordinating critical care and extensive data review.  No time overlap and excludes procedures.

## 2018-07-25 NOTE — PROGRESS NOTES
At 1930 report received at bedside, assumed care. Pt is resting in bed. A&O x 4. No complaints of pain. Patient pale and diaphoretic. BP 83/52, HR 59.  Patient had 1L of bright red bloody stool. Tele box on. Chart reviewed.     At 2015 Dr. Reynoso notified of patient hypotensive and large bloody stools. Received orders to transfer patient to ICU and transfuse RBC's. Received orders for stat labs.    At 2032 blood transfusion began, inserted new IV's in patient. ICU nurses at bedside.    At 2041 monitor room notified RN of patient in second degree type II. BP 84/46, patient HR in 40's . Crash cart at bedside. Dr. Medrano an Dr. Reynoso notified of patients condition.

## 2018-07-25 NOTE — CONSULTS
DATE OF SERVICE:  07/24/2018    REASON FOR CONSULTATION:  GI bleed.    HISTORY OF PRESENT ILLNESS:  Patient is a pleasant 52-year-old male who has a   history of coronary artery disease as well as COPD ___ DICTATION ENDS HERE       ____________________________________     DO ELISE Waller / YOSVANY    DD:  07/24/2018 21:36:31  DT:  07/24/2018 22:26:13    D#:  [id number]  Job#:  [id number]

## 2018-07-25 NOTE — PROCEDURES
BEDSIDE ARTERIAL LINE PLACEMENT NOTE    PROCEDURE DATE: 07/24/18   PROCEDURE START TIME: 2200    PRIMARY PROCEDURALIST: Lisbeth Reynoso  ASSISTANT(S): None      INFORMED CONSENT: Informed Consent obtained and on the chart    UNIVERSAL PROTOCOL / SAFETY CHECKLIST  Sign in Communication: Completed    Time Out:  Team Confirms the Correct Patient, Correct Procedure, Correct Site and Site Marking, Correct Position (if applicable), Prep and Dry Time (if applicable).  Time:  2157    Affirmation of Time Out: YES      Sign Out Discussion: Completed    PROCEDURE: ARTERIAL CATHETER INSERTION  Line/Indication: Single lumen for Monitoring of vital bodily functions (BP, pH, paO2, paCO2)  and Frequent ABGs & Labs.  Anesthesia/Sedation: Local; lidocaine 1%  Insertion Site: R Radial arteryArea Prep: Skin prep with chlorhexidine gluconate. The usual aseptic technique & maximal barrier precautions were used.    Technique Used to Place Line: Modified Seldinger  Ultrasound Used for Insertion: Yes    Modified Merrick’s test was completed and positive prior to vessel cannulation. The vessel was cannulated under direct ultrasound visualization  with a 20 gauge catheter on the second attempt. A straight-tipped spring wire was passed into the artery through the indwelling catheter and left in situ while the catheter was advanced. The catheter was left in situ while the guidewire was removed.      Pulsatile blood flow exited the catheter and an arterial waveform was noted on the monitor when the catheter was transduced. The catheter was secured in place with sterile sutures. A sterile dressing was applied and dated.     All catheters, needles and/or wires were accounted for and intact: Yes.     Patient tolerated procedure well.    Complications: None    No Specimens Collected Unless Noted Here    Estimated Blood Loss: None    Lisbeth Reynoso  07/24/18  10:29 PM

## 2018-07-25 NOTE — PROCEDURES
DATE OF SERVICE:  07/25/2018    INDICATION FOR PROCEDURE:  GI bleeding.    PROCEDURE:  Colonoscopy.    POSTOPERATIVE DIAGNOSIS:  Diverticulosis.    ANESTHESIA:  I directly observed the endoscopy nurse giving conscious   sedation.  Start time was at 9:44 a.m., end time was 10:04 a.m.    DESCRIPTION OF PROCEDURE:  After informed consent and appropriate sedation,   the patient was placed in the left lateral position and the adult colonoscope   was advanced through the rectum through to the cecum, which was confirmed by   presence of the ileocecal valve as well as the appendiceal orifice.  The   terminal ileum was intubated and showed no signs of old blood there.  The   right colon did have old blood clots, but no obvious source.  There was   diverticulosis seen in the left colon.  However, I suspect that it may have   also involved the right colon since the majority of the blood was there.    After extensive irrigation and suction, no obvious source was identified.  The   prep was adequate to see polyps greater than 5 mm.  The scope was gently   withdrawn with mucosa examined carefully.  The descending and sigmoid colon   had mild diverticulosis with small mouth diverticula.  Retroflexion was   performed in the rectum, which was unremarkable.  The bowel was decompressed.    The scope was withdrawn.  There were no immediate postop complications.    RECOMMENDATIONS:  1.  Likely a diverticular bleed.  2.  Okay to advance diet now.  3.  If the patient rebleeds again in the future, recommend a bleeding scan be   done as soon as possible to try to locate the bleed.  When the patient has to   take the time to prep adequately typically the bleed and this already stopped   by then and colonoscopy is not of a huge benefit.  There is no colon   malignancy that has been causing the bleed, so this is likely a benign   diverticular disease.  If patient has recurrent GI bleeds from this, patient   may need surgical resection of the  affected area.  This would best be   determined by bleeding scan that may localize it to right or left colon.  4.  Recommend being off of Coumadin for a week or two before restarting it.    The patient is at risk for recurrent diverticular bleed in the future;   however, there is no way to know whether the patient truly will bleed again or   not.  5.  We will sign off on the patient for now.  Please call if you have any   questions or concerns.       ____________________________________     DO ELISE Waller / YOSVANY    DD:  07/25/2018 10:20:56  DT:  07/25/2018 10:34:43    D#:  [id number]  Job#:  [id number]

## 2018-07-26 PROBLEM — E83.42 HYPOMAGNESEMIA: Status: ACTIVE | Noted: 2018-07-26

## 2018-07-26 LAB
ALBUMIN SERPL BCP-MCNC: 3.4 G/DL (ref 3.2–4.9)
ALBUMIN/GLOB SERPL: 1.3 G/DL
ALP SERPL-CCNC: 72 U/L (ref 30–99)
ALT SERPL-CCNC: 9 U/L (ref 2–50)
ANION GAP SERPL CALC-SCNC: 7 MMOL/L (ref 0–11.9)
AST SERPL-CCNC: 12 U/L (ref 12–45)
BASOPHILS # BLD AUTO: 1 % (ref 0–1.8)
BASOPHILS # BLD: 0.18 K/UL (ref 0–0.12)
BILIRUB SERPL-MCNC: 0.5 MG/DL (ref 0.1–1.5)
BUN SERPL-MCNC: 10 MG/DL (ref 8–22)
CALCIUM SERPL-MCNC: 8.7 MG/DL (ref 8.5–10.5)
CHLORIDE SERPL-SCNC: 106 MMOL/L (ref 96–112)
CO2 SERPL-SCNC: 25 MMOL/L (ref 20–33)
CREAT SERPL-MCNC: 1.02 MG/DL (ref 0.5–1.4)
EOSINOPHIL # BLD AUTO: 0.67 K/UL (ref 0–0.51)
EOSINOPHIL NFR BLD: 3.7 % (ref 0–6.9)
ERYTHROCYTE [DISTWIDTH] IN BLOOD BY AUTOMATED COUNT: 61.5 FL (ref 35.9–50)
GLOBULIN SER CALC-MCNC: 2.7 G/DL (ref 1.9–3.5)
GLUCOSE SERPL-MCNC: 95 MG/DL (ref 65–99)
HCT VFR BLD AUTO: 27.1 % (ref 42–52)
HGB BLD-MCNC: 7.9 G/DL (ref 14–18)
IMM GRANULOCYTES # BLD AUTO: 0.39 K/UL (ref 0–0.11)
IMM GRANULOCYTES NFR BLD AUTO: 2.2 % (ref 0–0.9)
LYMPHOCYTES # BLD AUTO: 1.28 K/UL (ref 1–4.8)
LYMPHOCYTES NFR BLD: 7.2 % (ref 22–41)
MAGNESIUM SERPL-MCNC: 1.8 MG/DL (ref 1.5–2.5)
MCH RBC QN AUTO: 20.2 PG (ref 27–33)
MCHC RBC AUTO-ENTMCNC: 29.2 G/DL (ref 33.7–35.3)
MCV RBC AUTO: 69.3 FL (ref 81.4–97.8)
MONOCYTES # BLD AUTO: 0.89 K/UL (ref 0–0.85)
MONOCYTES NFR BLD AUTO: 5 % (ref 0–13.4)
NEUTROPHILS # BLD AUTO: 14.47 K/UL (ref 1.82–7.42)
NEUTROPHILS NFR BLD: 80.9 % (ref 44–72)
NRBC # BLD AUTO: 0.03 K/UL
NRBC BLD-RTO: 0.2 /100 WBC
PHOSPHATE SERPL-MCNC: 2.9 MG/DL (ref 2.5–4.5)
PLATELET # BLD AUTO: 314 K/UL (ref 164–446)
PMV BLD AUTO: 9.7 FL (ref 9–12.9)
POTASSIUM SERPL-SCNC: 4 MMOL/L (ref 3.6–5.5)
PROT SERPL-MCNC: 6.1 G/DL (ref 6–8.2)
RBC # BLD AUTO: 3.91 M/UL (ref 4.7–6.1)
SODIUM SERPL-SCNC: 138 MMOL/L (ref 135–145)
WBC # BLD AUTO: 17.9 K/UL (ref 4.8–10.8)

## 2018-07-26 PROCEDURE — 99232 SBSQ HOSP IP/OBS MODERATE 35: CPT | Performed by: HOSPITALIST

## 2018-07-26 PROCEDURE — A9270 NON-COVERED ITEM OR SERVICE: HCPCS | Performed by: INTERNAL MEDICINE

## 2018-07-26 PROCEDURE — 83735 ASSAY OF MAGNESIUM: CPT

## 2018-07-26 PROCEDURE — 700111 HCHG RX REV CODE 636 W/ 250 OVERRIDE (IP): Performed by: HOSPITALIST

## 2018-07-26 PROCEDURE — 84100 ASSAY OF PHOSPHORUS: CPT

## 2018-07-26 PROCEDURE — 99232 SBSQ HOSP IP/OBS MODERATE 35: CPT | Performed by: INTERNAL MEDICINE

## 2018-07-26 PROCEDURE — 700102 HCHG RX REV CODE 250 W/ 637 OVERRIDE(OP): Performed by: INTERNAL MEDICINE

## 2018-07-26 PROCEDURE — 80053 COMPREHEN METABOLIC PANEL: CPT

## 2018-07-26 PROCEDURE — 770020 HCHG ROOM/CARE - TELE (206)

## 2018-07-26 PROCEDURE — 85025 COMPLETE CBC W/AUTO DIFF WBC: CPT

## 2018-07-26 RX ORDER — MAGNESIUM SULFATE HEPTAHYDRATE 40 MG/ML
2 INJECTION, SOLUTION INTRAVENOUS ONCE
Status: COMPLETED | OUTPATIENT
Start: 2018-07-26 | End: 2018-07-26

## 2018-07-26 RX ADMIN — MAGNESIUM SULFATE HEPTAHYDRATE 2 G: 40 INJECTION, SOLUTION INTRAVENOUS at 12:16

## 2018-07-26 RX ADMIN — BUDESONIDE AND FORMOTEROL FUMARATE DIHYDRATE 2 PUFF: 80; 4.5 AEROSOL RESPIRATORY (INHALATION) at 23:19

## 2018-07-26 RX ADMIN — ROSUVASTATIN CALCIUM 40 MG: 20 TABLET, FILM COATED ORAL at 17:20

## 2018-07-26 RX ADMIN — BUDESONIDE AND FORMOTEROL FUMARATE DIHYDRATE 2 PUFF: 80; 4.5 AEROSOL RESPIRATORY (INHALATION) at 02:08

## 2018-07-26 RX ADMIN — ALBUTEROL SULFATE 2 PUFF: 90 AEROSOL, METERED RESPIRATORY (INHALATION) at 17:24

## 2018-07-26 ASSESSMENT — ENCOUNTER SYMPTOMS
EYE DISCHARGE: 0
EYE REDNESS: 0
FEVER: 0
HEMOPTYSIS: 0
ABDOMINAL PAIN: 0
HEADACHES: 0
STRIDOR: 0
FOCAL WEAKNESS: 0
PALPITATIONS: 0
SPUTUM PRODUCTION: 0
DIZZINESS: 0
SEIZURES: 0
NAUSEA: 0
COUGH: 0
CHILLS: 0
HALLUCINATIONS: 0
BACK PAIN: 0
MEMORY LOSS: 0
SPEECH CHANGE: 0
BLOOD IN STOOL: 0
FLANK PAIN: 0
VOMITING: 0
NECK PAIN: 0
DEPRESSION: 0

## 2018-07-26 ASSESSMENT — PAIN SCALES - GENERAL
PAINLEVEL_OUTOF10: 0

## 2018-07-26 ASSESSMENT — LIFESTYLE VARIABLES: SUBSTANCE_ABUSE: 0

## 2018-07-26 NOTE — CARE PLAN
Problem: Safety  Goal: Will remain free from falls    Intervention: Implement fall precautions  Patient utilizes call light appropriately, has treaded slipper socks on, upper side rails up, fall risk assessment completed.       Problem: Fluid Volume:  Goal: Will maintain balanced intake and output    Intervention: Monitor, educate, and encourage compliance with therapeutic intake of liquids  Patient is now on clear liquid diet and is diuresing well. Will continue to monitor strict I&Os

## 2018-07-26 NOTE — PROGRESS NOTES
Pulmonary Critical Care Progress Note        Date of Admission:  7/24/2018  3:07 PM    Chief Complaint: GIB, hypotension    History of Present Illness: 52 y.o. male with a past medical history of ischemic cardiomyopathy with ejection fraction 25-30% on aspirin, paroxysmal atrial fibrillation on Coumadin, presented to the hospital complaining of a new onset of painless bloody bowel movements starting today accompanied by hypotension.  Following ICU admission, ICU consult was requested.  While in the ICU, developed another bout of bloody bowel movement accompanied by significant hypotension and bradycardia requiring vasopressive support.  On my encounter with the patient who concurs with a history of present illness.  He denies hematemesis, abdominal pain, chest pain, melena, nausea, vomiting, chills or fevers.  INR 3.43 on admission he has received 2 units of FFP's, vitamin K, factor VII, 3 units of blood, and 1 unit of platelets pending.  GI consult requested CT abdomen to evaluate for the source of GI bleeding.    ROS:  Respiratory: negative, Cardiac: negative, GI: negative.  All other systems negative.    Interval Events:  24 hour interval history reviewed    - a/o x 4,    - sr   - normotensive   - off dopamine this am   - good UOP   - up to chair   - room air   - +smoker, not wheezing   - advancing diet   - OK to transfer out of ICU  Yesterday   - RRT last night for hypotension and SB 30's   - a/o x 4   - SR/60's, on dopamine 10   - no further heart block this am   - colonoscopy this am   - afebrile   - mild red BM   - PPI gtt   - CXR 7/24 mild edema   - likely diverticular bleed   - EF better today 50%    PFSH:  No change.    Respiratory:     Pulse Oximetry: 98 %  Chest Tube Drains:          Exam: unlabored respirations, no intercostal retractions or accessory muscle use and diminished breath sounds moderate  ImagingAvailable data reviewed         Invalid input(s): [id number]    HemoDynamics:  Pulse: 62,  Heart Rate (Monitored): 63  Blood Pressure: 113/39, Arterial BP: 135/74, NIBP: 124/62      Exam: Sinus rhythm/sinus bradycardia now off dopamine infusion, regular, no murmur  Imaging: Available data reviewed  Recent Labs      07/25/18   0354  07/25/18   0800  07/25/18   1300   TROPONINI  0.05*  0.05*  0.04       Neuro:  GCS         Exam: no focal deficits noted  Imaging: Available data reviewed    Fluids:  Intake/Output       07/24/18 0700 - 07/25/18 0659 07/25/18 0700 - 07/26/18 0659 07/26/18 0700 - 07/27/18 0659      [id number] 4381-3563 Total 3697-7705 5525-6362 Total 0464-7097 7345-9366 Total       Intake    P.O.  --  -- --  950  -- 950  600  -- 600    P.O. -- -- -- 950 -- 950 600 -- 600    I.V.  --  564.2 564.2  560.7  226.8 787.5  --  -- --    Norepinephrine Volume -- 198.5 198.5 -- -- -- -- -- --    Dopamine Volume -- 265.8 265.8 560.7 226.8 787.5 -- -- --    IV Piggyback Volume (IV Piggyback) -- 100 100 -- -- -- -- -- --    Blood  484.4  333 817.4  662.5  -- 662.5  --  -- --    Volume (RELEASE FRESH FROZEN PLASMA) 195.4 -- 195.4 -- -- -- -- -- --    Volume (RELEASE FRESH FROZEN PLASMA) 289 -- 289 -- -- -- -- -- --    Volume (RELEASE RED BLOOD CELLS) -- -- -- 300 -- 300 -- -- --    Volume (RELEASE FRESH FROZEN PLASMA) -- 333 333 -- -- -- -- -- --    Volume (RELEASE RED BLOOD CELLS-ACTIVE BLEEDING) -- -- -- 300 -- 300 -- -- --    Volume (RELEASE RED BLOOD CELLS-ACTIVE BLEEDING) -- -- -- 62.5 -- 62.5 -- -- --    Total Intake 484.4 897.2 1381.6 2173.2 226.8 2400 600 -- 600       Output    Urine  --  2000 2000 1875  2400 4275  350  -- 350    Output (mL) (Urinary Catheter Indwelling Catheter 16) -- 2000 2000 1875 2400 4275 350 -- 350    Stool  --  1000 1000  50  -- 50  --  -- --    Number of Times Stooled -- 7 x 7 x 1 x -- 1 x -- -- --    Measurable Stool (mL) -- 1000 1000 50 -- 50 -- -- --    Total Output -- 3000 3000 1925 2400 4325 350 -- 350       Net I/O     484.4 -2102.8 -1618.4 248.2 -2173.2 -1925 250 -- 250         Weight: (!) 133 kg (293 lb 3.4 oz)  Recent Labs      18   1520  18   0354  18   0515   SODIUM  138  142  138   POTASSIUM  4.6  4.2  4.0   CHLORIDE  110  112  106   CO2  22  24  25   BUN  22  23*  10   CREATININE  1.38  1.10  1.02   MAGNESIUM   --   1.8  1.8   PHOSPHORUS   --   2.9  2.9   CALCIUM  8.4*  7.9*  8.7       GI/Nutrition:  Exam: abdomen is soft and non-tender  Imaging: Available data reviewed  NPO  Liver Function  Recent Labs      18   1520  18   0354  18   0515   ALTSGPT  12  10  9   ASTSGOT  12  12  12   ALKPHOSPHAT  77  62  72   TBILIRUBIN  0.4  0.7  0.5   LIPASE  40   --    --    GLUCOSE  127*  110*  95       Heme:  Recent Labs      18   1520  183   18   0354  18   0800  18   1410  18   0515   RBC  4.82  3.83*   --   3.72*   --    --   3.91*   HEMOGLOBIN  8.4*  6.7*   < >  7.4*  7.0*  7.6*  7.9*   HEMATOCRIT  30.7*  24.5*   < >  24.7*  23.1*  25.0*  27.1*   PLATELETCT  408  389   --   357   --    --   314   PROTHROMBTM  34.3*  22.8*   --   16.4*   --    --    --    APTT  42.2*   --    --    --    --    --    --    INR  3.43*  2.05*   --   1.35*   --    --    --    IRON  13*   --    --    --    --    --    --    FERRITIN  10.0*   --    --    --    --    --    --    TOTIRONBC  304   --    --    --    --    --    --     < > = values in this interval not displayed.       Infectious Disease:  Monitored Temp 2  Av °C (98.6 °F)  Min: 36.7 °C (98.1 °F)  Max: 37.3 °C (99.1 °F)  Temp  Av °C (98.6 °F)  Min: 36.7 °C (98.1 °F)  Max: 37.3 °C (99.1 °F)  Micro: reviewed  Recent Labs      18   1520  18   03518   0515   WBC  18.0*  20.9*  19.0*  17.9*   NEUTSPOLYS  78.50*   --   80.40*  80.90*   LYMPHOCYTES  7.20*   --   6.50*  7.20*   MONOCYTES  5.80   --   5.50  5.00   EOSINOPHILS  4.70   --   1.90  3.70   BASOPHILS  0.90   --   0.80  1.00   ASTSGOT  12   --   12  12   ALTSGPT  12   --    10  9   ALKPHOSPHAT  77   --   62  72   TBILIRUBIN  0.4   --   0.7  0.5     Current Facility-Administered Medications   Medication Dose Frequency Provider Last Rate Last Dose   • albuterol inhaler 2 Puff  2 Puff Q6HRS PRN (RT) Lisbeth Reynoso M.D.       • budesonide-formoterol (SYMBICORT) 80-4.5 MCG/ACT inhaler 2 Puff  2 Puff BID (RT) Lisbeth Reynoso M.D.   2 Puff at 07/26/18 0208   • rosuvastatin (CRESTOR) tablet 40 mg  40 mg Q EVENING Lisbeth Reynoso M.D.   40 mg at 07/25/18 1710   • Respiratory Care per Protocol   Continuous RT Lisbeth Reynoso M.D.       • ondansetron (ZOFRAN) syringe/vial injection 4 mg  4 mg Q4HRS PRN Lisbeth Reynoso M.D.       • ondansetron (ZOFRAN ODT) dispertab 4 mg  4 mg Q4HRS PRN Lisbeth Reynoso M.D.       • nicotine (NICODERM) 14 MG/24HR 14 mg  14 mg Daily-0600 Lisbeth Reynoso M.D.        And   • nicotine polacrilex (NICORETTE) 2 MG piece 2 mg  2 mg Q HOUR PRN Lisbeth Reynoso M.D.       • acetaminophen (TYLENOL) tablet 650 mg  650 mg Q6HRS PRN Lisbeth Reynoso M.D.       • norepinephrine (LEVOPHED) 8 mg in  mL Infusion  0-30 mcg/min Continuous Lisbeth Reynoso M.D.   Stopped at 07/25/18 0600   • DOPamine 1600 mcg/mL in D5W premix  0-20 mcg/kg/min Continuous Lisbeth Reynoso M.D.   Stopped at 07/26/18 0300     Last reviewed on 7/24/2018  4:41 PM by Vinny Williamson    Quality  Measures:   Labs reviewed, Medications reviewed and Radiology images reviewed                          Assessment / Plan  1.  GI bleed:  -On Coumadin with therapeutic INR and aspirin  -Status post FFP's, factor VII, vitamin K, PRBCs, platelet  Transfusion  -Series of CBCs and INR  -GI is following  -CT abdomen is pending  -Protonix drip     2.  Hypotension  -Due to GI bleed  -Pre-existing systolic dysfunction with ejection fraction 25%  -Bradycardia  -IV Levophed and dopamine, wean off per map equal or greater 65 mmHg  -Cardiology is following     3.  Leukocytosis  -Stool C. difficile pending  -Empiric Flagyl for now  -Likely stress  induced  -Daily monitoring WBC trend     4.  Paroxysmal atrial fibrillation  -Presently in sinus rhythm     5.  History of ischemic cardiomyopathy with ejection fraction 25-30%  -Stable  -As needed Lasix for worsening hypoxemia     6.  History of chronic kidney disease, stage III  -Monitoring renal function and urine output     7.  ICU prophylaxis  -Protonix and SCDs  The ROS, Physical Exam and Plan were updated today (07/26/18).  Compared with yesterday's note, there are no new changes except as documented above.      Now off dopamine.  Okay to transfer out of ICU.  Pulmonary service will sign off.  Please call if needed.  Discussed patient condition and risk of morbidity and/or mortality with RN, RT and QA team.

## 2018-07-26 NOTE — PROGRESS NOTES
Cardiology Progress Note               Author: Angelica Adam Date & Time created: 2018  8:17 AM     Interval History:  No more heart block. BP up, dopamine off.  EF was very good on dopamine at the time. He has received blood replacement. Hbg still 7.9. Feels well, wife at bedside. About to go for a walk.    C scope yesterday, old blood, diverticular bleeding, none active.     Data reviewed by me personally:  Current medications  Tele- sinus, sinus seymour  ECG 18 NSR, 73, delayed R wave progression, LAFB, nonspecific st-t wave changes anterolateral leads  Echo  EF 25-30%, mild AI, MR, dilated RV, no RVSP  CT Abd- poss mesenteric adenitis  CXR 18 mild pulm edema, cardiomegaly  Echo  EF 55% on dop 10     FH/SH reviewed/unchanged.      Chief Complaint:  Hypotension, bradyarrhythmia    Review of Systems   Constitutional: Negative for chills and fever.   Skin: Negative for itching and rash.       Physical Exam     General: No acute distress. Well nourished.  HEENT: EOM grossly intact, no scleral icterus, no pharyngeal erythema.   Neck:  No JVD, no bruits, trachea midline  CVS: RRR. Normal S1, S2. No M/R/G. No LE edema.  2+ radial pulses, 2+ PT pulses  Resp: CTAB. No wheezing or crackles/rhonchi. Normal respiratory effort.  Abdomen: Soft, NT, no joel hepatomegaly.  MSK/Ext: No clubbing or cyanosis.  Skin: Warm and dry, no rashes.  Neurological: CN III-XII grossly intact. No focal deficits.   Psych: A&O x 3, appropriate affect, good judgement      Hemodynamics:  Temp (24hrs), Av °C (98.6 °F), Min:36.7 °C (98.1 °F), Max:37.3 °C (99.1 °F)  Temperature: 37.1 °C (98.8 °F), Monitored Temp: 37.1 °C (98.8 °F)  Pulse  Av.7  Min: 44  Max: 88Heart Rate (Monitored): 63  Blood Pressure: 113/39, Arterial BP: 135/74, NIBP: 124/62  CVP (mm Hg): (!) 300 MM HG  Respiratory:    Respiration: 15, Pulse Oximetry: 98 %, O2 Daily Delivery Respiratory : Silicone Nasal Cannula     Work Of Breathing / Effort:  Mild  RUL Breath Sounds: Clear, RML Breath Sounds: Clear, RLL Breath Sounds: Diminished, GENNARO Breath Sounds: Clear, LLL Breath Sounds: Diminished  Fluids:  Date 07/25/18 0700 - 07/26/18 0659   Shift 4442-0736 2664-3063 4601-4718 24 Hour Total   I  N  T  A  K  E   Blood 600   600    Shift Total 600   600   O  U  T  P  U  T   Shift Total       Weight (kg) 140 140 140 140       Weight: (!) 133 kg (293 lb 3.4 oz)  GI/Nutrition:  Orders Placed This Encounter   Procedures   • Diet Order Full Liquid     Standing Status:   Standing     Number of Occurrences:   1     Order Specific Question:   Diet:     Answer:   Full Liquid [11]     Lab Results:  Recent Labs      07/24/18 2023 07/25/18 0354 07/25/18   0800 07/25/18   1410  07/26/18   0515   WBC  20.9*   --   19.0*   --    --   17.9*   RBC  3.83*   --   3.72*   --    --   3.91*   HEMOGLOBIN  6.7*   < >  7.4*  7.0*  7.6*  7.9*   HEMATOCRIT  24.5*   < >  24.7*  23.1*  25.0*  27.1*   MCV  64.0*   --   66.4*   --    --   69.3*   MCH  17.5*   --   19.9*   --    --   20.2*   MCHC  27.3*   --   30.0*   --    --   29.2*   RDW  50.1*   --   56.7*   --    --   61.5*   PLATELETCT  389   --   357   --    --   314   MPV  10.2   --   9.5   --    --   9.7    < > = values in this interval not displayed.     Recent Labs      07/24/18 1520 07/25/18 0354 07/26/18   0515   SODIUM  138  142  138   POTASSIUM  4.6  4.2  4.0   CHLORIDE  110  112  106   CO2  22  24  25   GLUCOSE  127*  110*  95   BUN  22  23*  10   CREATININE  1.38  1.10  1.02   CALCIUM  8.4*  7.9*  8.7     Recent Labs      07/24/18 1520 07/24/18 2023 07/25/18 0354   APTT  42.2*   --    --    INR  3.43*  2.05*  1.35*         Recent Labs      07/25/18   0354  07/25/18   0800  07/25/18   1300   TROPONINI  0.05*  0.05*  0.04             Medical Decision Making, by Problem:  Active Hospital Problems    Diagnosis   • Hemorrhagic shock (HCC) [R57.8]   • Acute lower GI bleeding [K92.2]   • Bradycardia [R00.1]   •  Cigarette nicotine dependence with nicotine-induced disorder [F17.219]   • CKD (chronic kidney disease), stage II [N18.2]   • Dyslipidemia [E78.5]   • SHASHANK (obstructive sleep apnea) [G47.33]   • Class 2 severe obesity due to excess calories with serious comorbidity and body mass index (BMI) of 36.0 to 36.9 in adult ([facility]) [E66.01, Z68.36]   • Essential hypertension [I10]   • Paroxysmal atrial fibrillation ([facility]) [I48.0]   • Normocytic anemia [D64.9]   • Leukocytosis [D72.829]   • CAD (coronary artery disease) [I25.10]   • Chronic systolic CHF (congestive heart failure), NYHA class 4 ([facility]) [I50.22]   • Type 2 diabetes mellitus with complication, without long-term current use of insulin ([facility]) [E11.8]   • COPD (chronic obstructive pulmonary disease) ([facility]) [J44.9]   • History of stroke [Z86.73]       Plan:  1.  Lower gastrointestinal bleed, diverticular bleed, old blood, nothing to fix. >4 Units PRBCs I believe. new this admit   2.  Paroxysmal atrial fibrillation, in sinus, no anticoagulation due to #1  3.  CAD, s/p CABG with a complicated postoperative course  in 12/2017. LIMA to the LAD and a saphenous vein graft to the marginal  branch of the circumflex.  4. Ischemic cardiomyopathy, [facility] 2, EF 25%. EF at bedside looks generally normal on dopamine 10 which is now off  5. Abnormal troponin, similar to prior in March 2018, not concerning  6.  Dyslipidemia.  7. Anemia, due to #1  8. Intermittent heart block, 2;1, narrow QRS, appears to have resolved, was sinus seymour prior, possible vagal response. Has not recurred, new this admit  9. Chronic anticoagulation with coumadin, currently contraindicated  10. Shock, low volume likely, new this admit    -On crestor only at this point for CAD  -Blood thinner contraindicated at this time and probably will be held for a while before considering rechallenge  -I do not think the intermittent heart block is contributing at this point, monitor for recurrence, none seen so far  -MARK Pedraza  [place]      Quality-Core Measures   Reviewed items::  EKG reviewed, Labs reviewed, Medications reviewed and Radiology images reviewed

## 2018-07-26 NOTE — DISCHARGE PLANNING
Medical SW    Sw attended AM IDT Rounds.    RN reports, pt A/O x4, alice in place, up to chair, on room air,     Plan: Sw to assist w/ d/c planning as needed.

## 2018-07-26 NOTE — CARE PLAN
Problem: Bronchoconstriction:  Goal: Improve in air movement and diminished wheezing  Patient has showed signs of respiratory stability w/o bronchodilator treatments.  Patient has been reassessed and SVN has been discontinued per protocol.

## 2018-07-26 NOTE — PROGRESS NOTES
[facility] Hospitalist Progress Note    Date of Service: 2018    Chief Complaint  52 y.o. male admitted 2018 with GIB and shock    Interval Problem Update  SR   Off dopamine 3 am  UO 2.4 L  On room air  Mg 1.8  No rectal bleeding or bowel movements overnight  Tolerating diet              Consultants/Specialty  GI  Cardiology  Critical care    Disposition  ICU monitoring        Review of Systems   Constitutional: Negative for chills and fever.   HENT: Negative for congestion, ear discharge and nosebleeds.    Eyes: Negative for discharge and redness.   Respiratory: Negative for cough, hemoptysis, sputum production and stridor.    Cardiovascular: Negative for chest pain, palpitations and leg swelling.   Gastrointestinal: Negative for abdominal pain, blood in stool, melena, nausea and vomiting.   Genitourinary: Negative for flank pain and hematuria.   Musculoskeletal: Negative for back pain and neck pain.   Skin: Negative.    Neurological: Negative for dizziness, speech change, focal weakness, seizures and headaches.   Psychiatric/Behavioral: Negative for depression, hallucinations, memory loss and substance abuse.   All other systems reviewed and are negative.     Physical Exam  Laboratory/Imaging   Hemodynamics  Temp (24hrs), Av °C (98.6 °F), Min:36.7 °C (98.1 °F), Max:37.3 °C (99.1 °F)   Temperature: 37.1 °C (98.8 °F), Monitored Temp: 37.2 °C (99 °F)  Pulse  Av.6  Min: 44  Max: 88 Heart Rate (Monitored): 66  Blood Pressure: 113/39, Arterial BP: 135/74, NIBP: 131/58     Respiratory      Respiration: (!) 24, Pulse Oximetry: 99 %, O2 Daily Delivery Respiratory : Silicone Nasal Cannula     Work Of Breathing / Effort: Mild  RUL Breath Sounds: Clear, RML Breath Sounds: Clear, RLL Breath Sounds: Diminished, GENNARO Breath Sounds: Clear, LLL Breath Sounds: Diminished    Fluids    Intake/Output Summary (Last 24 hours) at 18 0920  Last data filed at 18 0800   Gross per 24 hour   Intake          2347.36  ml   Output             4275 ml   Net         -1927.64 ml       Nutrition  Orders Placed This Encounter   Procedures   • Diet Order Full Liquid     Standing Status:   Standing     Number of Occurrences:   1     Order Specific Question:   Diet:     Answer:   Full Liquid [11]     Physical Exam   Constitutional: He is oriented to person, place, and time. He appears well-developed and well-nourished.   HENT:   Head: Normocephalic and atraumatic.   Right Ear: External ear normal.   Left Ear: External ear normal.   Mouth/Throat: No oropharyngeal exudate.   Eyes: Conjunctivae are normal. Right eye exhibits no discharge. Left eye exhibits no discharge. No scleral icterus.   Neck: Neck supple. No JVD present. No tracheal deviation present.   Cardiovascular: Normal rate and regular rhythm.  Exam reveals no gallop and no friction rub.    Murmur heard.  Pulmonary/Chest: Effort normal and breath sounds normal. No respiratory distress. He has no wheezes. He exhibits no tenderness.   Abdominal: Soft. Bowel sounds are normal. He exhibits no distension. There is no tenderness. There is no rebound.   Musculoskeletal: He exhibits edema. He exhibits no tenderness or deformity.   Neurological: He is alert and oriented to person, place, and time. No cranial nerve deficit. He exhibits normal muscle tone.   Skin: Skin is warm and dry. He is not diaphoretic. No cyanosis or erythema. Nails show no clubbing.   Psychiatric: He has a normal mood and affect. His behavior is normal. Thought content normal.   Nursing note and vitals reviewed.      Recent Labs      07/24/18   2023   07/25/18   0354  07/25/18   0800  07/25/18   1410  07/26/18   0515   WBC  20.9*   --   19.0*   --    --   17.9*   RBC  3.83*   --   3.72*   --    --   3.91*   HEMOGLOBIN  6.7*   < >  7.4*  7.0*  7.6*  7.9*   HEMATOCRIT  24.5*   < >  24.7*  23.1*  25.0*  27.1*   MCV  64.0*   --   66.4*   --    --   69.3*   MCH  17.5*   --   19.9*   --    --   20.2*   MCHC  27.3*   --    30.0*   --    --   29.2*   RDW  50.1*   --   56.7*   --    --   61.5*   PLATELETCT  389   --   357   --    --   314   MPV  10.2   --   9.5   --    --   9.7    < > = values in this interval not displayed.     Recent Labs      07/24/18   1520  07/25/18 0354  07/26/18   0515   SODIUM  138  142  138   POTASSIUM  4.6  4.2  4.0   CHLORIDE  110  112  106   CO2  22  24  25   GLUCOSE  127*  110*  95   BUN  22  23*  10   CREATININE  1.38  1.10  1.02   CALCIUM  8.4*  7.9*  8.7     Recent Labs      07/24/18   1520  07/24/18 2023 07/25/18   0354   APTT  42.2*   --    --    INR  3.43*  2.05*  1.35*                  Assessment/Plan     Hemorrhagic shock (HCC)- (present on admission)   Assessment & Plan    Secondary to lower GI bleed    Shock resolved weaned off pressors            Bradycardia- (present on admission)   Assessment & Plan    Resolved          Acute lower GI bleeding- (present on admission)   Assessment & Plan      No active bleeding on colonoscopy   Likely diverticular bleed     Transfuse to keep hemoglobin greater than 7  If rebleeds will need nuclear medicine scan to identify site of bleeding                Hypomagnesemia   Assessment & Plan    Replete and monitor         Class 2 severe obesity due to excess calories with serious comorbidity and body mass index (BMI) of 36.0 to 36.9 in adult (HCC)- (present on admission)   Assessment & Plan    Body mass index is 36.6 kg/m².        Dyslipidemia- (present on admission)   Assessment & Plan    Continue Crestor        CKD (chronic kidney disease), stage II- (present on admission)   Assessment & Plan    Stable monitor        Cigarette nicotine dependence with nicotine-induced disorder- (present on admission)   Assessment & Plan    Counseled on cessation        Paroxysmal atrial fibrillation (HCC)- (present on admission)   Assessment & Plan    Dig amiodarone and carvedilol held for bradycardia  Anticoagulation reversed given GI bleed   GI recommendation is to hold  anticoagulation for 1-2 weeks        Normocytic anemia- (present on admission)   Assessment & Plan    Secondary to acute blood loss    Hemoglobin improved posttransfusion continue to monitor        Leukocytosis- (present on admission)   Assessment & Plan    Chronic    Monitor clinically        CAD (coronary artery disease)- (present on admission)   Assessment & Plan    Underwent coronary artery bypass graft in December 2017  Aspirin on hold with underlying GI bleeding  Coumadin on hold with underlying GI bleeding    Continue statin  If blood pressure remains stable consider resuming low-dose ACE inhibitor  Beta-blocker on hold given bradycardia and recent shock  Cardiology following          Chronic systolic CHF (congestive heart failure), NYHA class 4 ([facility])- (present on admission)   Assessment & Plan    Continue clinical monitoring  Resume Lasix tomorrow if blood pressure remains stable        Type 2 diabetes mellitus with complication, without long-term current use of insulin ([facility])- (present on admission)   Assessment & Plan    Diet controlled   HbA1c 5.9        COPD (chronic obstructive pulmonary disease) ([facility])- (present on admission)   Assessment & Plan    Stable continue Symbicort and RT protocol          History of stroke- (present on admission)   Assessment & Plan    AC on hold, resume in 1 week per GI if no further signs of bleeding          Quality-Core Measures   Reviewed items::  Labs reviewed, Medications reviewed and Radiology images reviewed  Central line in place:  Shock  DVT prophylaxis pharmacological::  Contraindicated - High bleeding risk  DVT prophylaxis - mechanical:  SCDs  Ulcer Prophylaxis::  Yes      Plan of care reviewed with patient and discussed with cardiology and critical care.

## 2018-07-26 NOTE — CARE PLAN
Problem: Fluid Volume:  Goal: Will maintain balanced intake and output  Outcome: PROGRESSING AS EXPECTED      Problem: Respiratory:  Goal: Respiratory status will improve  Outcome: PROGRESSING AS EXPECTED

## 2018-07-27 VITALS
DIASTOLIC BLOOD PRESSURE: 64 MMHG | TEMPERATURE: 98 F | SYSTOLIC BLOOD PRESSURE: 112 MMHG | HEIGHT: 77 IN | HEART RATE: 71 BPM | OXYGEN SATURATION: 94 % | WEIGHT: 307.32 LBS | RESPIRATION RATE: 17 BRPM | BODY MASS INDEX: 36.29 KG/M2

## 2018-07-27 PROBLEM — R00.1 BRADYCARDIA: Status: RESOLVED | Noted: 2018-07-24 | Resolved: 2018-07-27

## 2018-07-27 PROBLEM — K92.2 ACUTE LOWER GI BLEEDING: Status: RESOLVED | Noted: 2018-07-24 | Resolved: 2018-07-27

## 2018-07-27 PROBLEM — E83.42 HYPOMAGNESEMIA: Status: RESOLVED | Noted: 2018-07-26 | Resolved: 2018-07-27

## 2018-07-27 PROBLEM — R57.8 HEMORRHAGIC SHOCK (HCC): Status: RESOLVED | Noted: 2018-07-24 | Resolved: 2018-07-27

## 2018-07-27 PROCEDURE — 99239 HOSP IP/OBS DSCHRG MGMT >30: CPT | Performed by: FAMILY MEDICINE

## 2018-07-27 ASSESSMENT — PAIN SCALES - GENERAL
PAINLEVEL_OUTOF10: 0

## 2018-07-27 NOTE — RESPIRATORY CARE
COPD EDUCATION by COPD CLINICAL EDUCATOR  7/27/2018 at 7:55 AM by Gila Patten     Patient reviewed by COPD education team. Patient does not qualify for COPD program.

## 2018-07-27 NOTE — PROGRESS NOTES
Transferred patient to T7 bed 1 in wheelchair with telemetry monitoring. Vitals stable and patient has no complaints of discomfort at this time.

## 2018-07-27 NOTE — CARE PLAN
Problem: Knowledge Deficit  Goal: Knowledge of disease process/condition, treatment plan, diagnostic tests, and medications will improve    Intervention: Assess knowledge level of disease process/condition, treatment plan, diagnostic tests, and medications  Patient is aware of his plan of care and states he has no further questions at this time.       Problem: Discharge Barriers/Planning  Goal: Patient's continuum of care needs will be met    Intervention: Assess potential discharge barriers on admission and throughout hospital stay   07/26/18 6323   OTHER   Does Admitting Nurse Feel This Could be a Complex Discharge? No       Patient is not foreseen to have a compex discharge.

## 2018-07-27 NOTE — PROGRESS NOTES
Bedside shift report received. Assumed care of patient. Patient resting comfortably in bed with eyes closed. Call light and personal items within reach. No further requests at this time per patient.

## 2018-07-27 NOTE — DISCHARGE INSTRUCTIONS
Discharge Instructions    Discharged to home by car with relative. Discharged via wheelchair, hospital escort: Yes.  Special equipment needed: Not Applicable    Be sure to schedule a follow-up appointment with your primary care doctor or any specialists as instructed.     Discharge Plan:   Smoking Cessation Offered: Patient Refused  Pneumococcal Vaccine Administered/Refused: Not given - Patient refused pneumococcal vaccine  Influenza Vaccine Indication: Patient Refuses    I understand that a diet low in cholesterol, fat, and sodium is recommended for good health. Unless I have been given specific instructions below for another diet, I accept this instruction as my diet prescription.   Other diet: as directed    Special Instructions: None    · Is patient discharged on Warfarin / Coumadin?   No     Depression / Suicide Risk    As you are discharged from this [facility] facility, it is important to learn how to keep safe from harming yourself.    Recognize the warning signs:  · Abrupt changes in personality, positive or negative- including increase in energy   · Giving away possessions  · Change in eating patterns- significant weight changes-  positive or negative  · Change in sleeping patterns- unable to sleep or sleeping all the time   · Unwillingness or inability to communicate  · Depression  · Unusual sadness, discouragement and loneliness  · Talk of wanting to die  · Neglect of personal appearance   · Rebelliousness- reckless behavior  · Withdrawal from people/activities they love  · Confusion- inability to concentrate     If you or a loved one observes any of these behaviors or has concerns about self-harm, here's what you can do:  · Talk about it- your feelings and reasons for harming yourself  · Remove any means that you might use to hurt yourself (examples: pills, rope, extension cords, firearm)  · Get professional help from the community (Mental Health, Substance Abuse, psychological counseling)  · Do not  be alone:Call your Safe Contact- someone whom you trust who will be there for you.  · Call your local CRISIS HOTLINE 766-2822 or 706-229-2553  · Call your local Children's Mobile Crisis Response Team [place] (619) 265-6967 or www.[employer]  · Call the toll free National Suicide Prevention Hotlines   · National Suicide Prevention Lifeline 716-105-BDMG (3245)  · [employer] Hope Line Network 800-SUICIDE (640-7899)      Gastrointestinal Bleeding  Gastrointestinal (GI) bleeding is bleeding somewhere along the digestive tract, between the mouth and anus. This can be caused by various problems. The severity of these problems can range from mild to serious or even life-threatening. If you have GI bleeding, you may find blood in your stools (feces), you may have black stools, or you may vomit blood. If there is a lot of bleeding, you may need to stay in the hospital.  What are the causes?  This condition may be caused by:  · Esophagitis. This is inflammation, irritation, or swelling of the esophagus.  · Hemorrhoids. These are swollen veins in the rectum.  · Anal fissures. These are areas of painful tearing that are often caused by passing hard stool.  · Diverticulosis. These are pouches that form on the colon over time, with age, and may bleed a lot.  · Diverticulitis. This is inflammation in areas with diverticulosis. It can cause pain, fever, and bloody stools, although bleeding may be mild.  · Polyps and cancer. Colon cancer often starts out as precancerous polyps.  · Gastritis and ulcers. With these, bleeding may come from the upper GI tract, near the stomach.  What are the signs or symptoms?  Symptoms of this condition may include:  · Bright red blood in your vomit, or vomit that looks like coffee grounds.  · Bloody, black, or tarry stools.  ¨ Bleeding from the lower GI tract will usually cause red or maroon blood in the stools.  ¨ Bleeding from the upper GI tract may cause black, tarry, often bad-smelling  stools.  ¨ In certain cases, if the bleeding is fast enough, the stools may be red.  · Pain or cramping in the abdomen.  How is this diagnosed?  This condition may be diagnosed based on:  · Medical history and physical exam.  · Various tests, such as:  ¨ Blood tests.  ¨ X-rays and other imaging tests.  ¨ Esophagogastroduodenoscopy (EGD). In this test, a flexible, lighted tube is used to look at your esophagus, stomach, and small intestine.  ¨ Colonoscopy. In this test, a flexible, lighted tube is used to look at your colon.  How is this treated?  Treatment for this condition depends on the cause of the bleeding. For example:  · For bleeding from the esophagus, stomach, small intestine, or colon, the health care provider doing your EGD or colonoscopy may be able to stop the bleeding as part of the procedure.  · Inflammation or infection of the colon can be treated with medicines.  · Certain rectal problems can be treated with creams, suppositories, or warm baths.  · Surgery is sometimes needed.  · Blood transfusions are sometimes needed if a lot of blood has been lost.  If bleeding is slow, you may be allowed to go home. If there is a lot of bleeding, you will need to stay in the hospital for observation.  Follow these instructions at home:  · Take over-the-counter and prescription medicines only as told by your health care provider.  · Eat foods that are high in fiber. This will help to keep your stools soft. These foods include whole grains, legumes, fruits, and vegetables. Eating 1-3 prunes each day works well for many people.  · Drink enough fluid to keep your urine clear or pale yellow.  · Keep all follow-up visits as told by your health care provider. This is important.  Contact a health care provider if:  · Your symptoms do not improve.  Get help right away if:  · Your bleeding increases.  · You feel light-headed or you faint.  · You feel weak.  · You have severe cramps in your back or abdomen.  · You pass  large blood clots in your stool.  · Your symptoms are getting worse.  This information is not intended to replace advice given to you by your health care provider. Make sure you discuss any questions you have with your health care provider.  Document Released: 12/15/2001 Document Revised: 05/17/2017 Document Reviewed: 06/06/2016  [employer] Interactive Patient Education © 2017 [employer]

## 2018-07-27 NOTE — PROGRESS NOTES
Patient arrived to new room. Cardiac monitor on. Vital signs stable. Oriented patient to room and unit routines. Call light within patient's reach. All needs addressed at this time.

## 2018-07-28 ENCOUNTER — PATIENT OUTREACH (OUTPATIENT)
Dept: HEALTH INFORMATION MANAGEMENT | Facility: OTHER | Age: 53
End: 2018-07-28

## 2018-07-28 NOTE — DISCHARGE SUMMARY
Discharge Summary    CHIEF COMPLAINT ON ADMISSION  Chief Complaint   Patient presents with   • Hypotension   • Bloody Stools       Reason for Admission  EMS 03     Admission Date  7/24/2018    CODE STATUS  Full code    HPI & HOSPITAL COURSE  This is a 52 y.o. male with past medical history of ischemic cardiomyopathy with ejection fraction of 25-30%, paroxysmal A. fib on Coumadin who came to the hospital with painless bloody bowel movements.  Patient was initially placed in the PCU floor but then developed large bloody bowel movements accompanied by significant hypotension and was transferred to intensive care unit.  Was started on pressors.  Patient was given FFP's along with vitamin K and factor VII with 3 units of PRBC and 1 units of platelets.  GI consulted and recommended colonoscopy with CT angiogram of the abdomen.  Patient also was found to be bradycardic.  Cardiology recommended to continue with the fluid resuscitation and there was no need for temporary pacemaker for the patient.  Heart rate improved over hospital course.  His hemorrhagic shock resolved and patient was off pressors.  His bradycardia resolved as well.  He was transferred out of ICU.  CT Marilou of the abdomen showed no aneurysm or dissection.  No extravasation of contrast identified.  Patient was on Protonix drip.  Patient also was having leukocytosis that is chronic with no signs of active infection.  Was initially placed on prophylactic antibiotics but then those were discontinued.  His cardiac medications were adjusted.  He was taken off all chemical anticoagulation.  Colonoscopy showed diverticular disease with GI attributed GI bleed because of diverticular bleed.  Patient hemoglobin improved over hospital course.  He was hemodynamically and clinically stable.  GI recommended to hold anticoagulation for at least 1-2 weeks.  Cardiology and GI cleared patient for discharge.  Patient was ambulatory in the room down the padilla.  He was  tolerating p.o. intake well.  It was felt that patient has reached his maximum benefit of this hospital stay.       Therefore, he is discharged in guarded and stable condition to home with close outpatient follow-up.    The patient met 2-midnight criteria for an inpatient stay at the time of discharge.    Discharge Date  7/27/2018    FOLLOW UP ITEMS POST DISCHARGE  Follow-up with PCP in 1 week.  Follow-up with GI as per recommendations.  Follow-up with cardiology as per recommendations.    DISCHARGE DIAGNOSES  Active Problems:    History of stroke POA: Yes    COPD (chronic obstructive pulmonary disease) ([facility]) POA: Yes    Type 2 diabetes mellitus with complication, without long-term current use of insulin ([facility]) POA: Yes    Chronic systolic CHF (congestive heart failure), NYHA class 4 ([facility]) POA: Yes    CAD (coronary artery disease) POA: Yes    Leukocytosis POA: Yes    Normocytic anemia POA: Yes    Paroxysmal atrial fibrillation ([facility]) POA: Yes    Essential hypertension POA: Yes    Cigarette nicotine dependence with nicotine-induced disorder POA: Yes    CKD (chronic kidney disease), stage II POA: Yes    Dyslipidemia POA: Yes    SHASHANK (obstructive sleep apnea) POA: Yes    Class 2 severe obesity due to excess calories with serious comorbidity and body mass index (BMI) of 36.0 to 36.9 in adult ([facility]) POA: Yes  Resolved Problems:    Hemorrhagic shock ([facility]) POA: Yes    Acute lower GI bleeding POA: Yes    Bradycardia POA: Yes    Hypomagnesemia POA: Unknown      FOLLOW UP  Future Appointments  Date Time Provider Department Center   10/16/2018 1:30 PM Jett Bedoya M.D. [facility] None     Nam Le M.D.  [address] [address]  [phone]            MEDICATIONS ON DISCHARGE     Medication List      CONTINUE taking these medications      Instructions   albuterol 108 (90 Base) MCG/ACT Aers inhalation aerosol   Inhale 2 Puffs by mouth every 6 hours as needed for Shortness of Breath.  Dose:  2 Puff      amiodarone 200 MG Tabs  Commonly known as:  CORDARONE   Take 1 Tab by mouth every day.  Dose:  200 mg     budesonide-formoterol 80-4.5 MCG/ACT Aero  Commonly known as:  SYMBICORT   Inhale 2 Puffs by mouth 2 Times a Day.  Dose:  2 Puff     ipratropium-albuterol 0.5-2.5 (3) MG/3ML nebulizer solution  Commonly known as:  DUONEB   3 mL by Nebulization route every 6 hours as needed for Shortness of Breath.  Dose:  3 mL     rosuvastatin 40 MG tablet  Commonly known as:  CRESTOR   Take 1 Tab by mouth every evening.  Dose:  40 mg        STOP taking these medications    aspirin 81 MG Chew chewable tablet  Commonly known as:  ASA     carvedilol 6.25 MG Tabs  Commonly known as:  COREG     digoxin 125 MCG Tabs  Commonly known as:  LANOXIN     furosemide 20 MG Tabs  Commonly known as:  LASIX     lisinopril 10 MG Tabs  Commonly known as:  PRINIVIL     spironolactone 50 MG Tabs  Commonly known as:  ALDACTONE     warfarin 5 MG Tabs  Commonly known as:  COUMADIN            Allergies  Allergies   Allergen Reactions   • Erythromycin Anaphylaxis     Rxn - 1994   • Cillins [Penicillins] Anaphylaxis and Rash     As a child, tolerated cefepime (12/2017), ceftriaxone (1/2018), cefazolin (12/2017)   • Metoprolol      Pt stated got latham and aggressive    • Shellfish Allergy Anaphylaxis     Pt stated that he is allergic to all seafood, will develop a rash and says that rash will clear up with benadryl       DIET  No orders of the defined types were placed in this encounter.      ACTIVITY  As tolerated.  Weight bearing as tolerated    CONSULTATIONS  GI  Cardiology  Critical care    PROCEDURES  Colonoscopy as mentioned above    LABORATORY  Lab Results   Component Value Date    SODIUM 138 07/26/2018    POTASSIUM 4.0 07/26/2018    CHLORIDE 106 07/26/2018    CO2 25 07/26/2018    GLUCOSE 95 07/26/2018    BUN 10 07/26/2018    CREATININE 1.02 07/26/2018        Lab Results   Component Value Date    WBC 17.9 (H) 07/26/2018    HEMOGLOBIN 7.9 (L)  07/26/2018    HEMATOCRIT 27.1 (L) 07/26/2018    PLATELETCT 314 07/26/2018        Total time of the discharge process exceeds 45 minutes.

## 2018-07-30 ENCOUNTER — PATIENT OUTREACH (OUTPATIENT)
Dept: HEALTH INFORMATION MANAGEMENT | Facility: OTHER | Age: 53
End: 2018-07-30

## 2018-07-30 NOTE — PROGRESS NOTES
Outcome: Pt requested a call back.    Please transfer to Patient Outreach Team at [phone] when patient returns call.    WebIZ Checked & Epic Updated:  yes    Attempt # 1

## 2018-07-30 NOTE — PROGRESS NOTES
Pt's wife called back, care gaps were addressed, did not want to schedule anything at this time.      -TP

## 2018-08-20 ENCOUNTER — HOSPITAL ENCOUNTER (INPATIENT)
Facility: MEDICAL CENTER | Age: 53
LOS: 1 days | DRG: 602 | End: 2018-08-22
Attending: EMERGENCY MEDICINE | Admitting: HOSPITALIST
Payer: MEDICARE

## 2018-08-20 DIAGNOSIS — L03.311 ABDOMINAL WALL CELLULITIS: ICD-10-CM

## 2018-08-20 DIAGNOSIS — I50.9 CONGESTIVE HEART FAILURE, UNSPECIFIED HF CHRONICITY, UNSPECIFIED HEART FAILURE TYPE (HCC): ICD-10-CM

## 2018-08-20 LAB
ALBUMIN SERPL BCP-MCNC: 3.6 G/DL (ref 3.2–4.9)
ALBUMIN/GLOB SERPL: 1 G/DL
ALP SERPL-CCNC: 103 U/L (ref 30–99)
ALT SERPL-CCNC: 12 U/L (ref 2–50)
ANION GAP SERPL CALC-SCNC: 8 MMOL/L (ref 0–11.9)
ANISOCYTOSIS BLD QL SMEAR: ABNORMAL
AST SERPL-CCNC: 14 U/L (ref 12–45)
BASOPHILS # BLD AUTO: 0.7 % (ref 0–1.8)
BASOPHILS # BLD: 0.11 K/UL (ref 0–0.12)
BILIRUB SERPL-MCNC: 0.5 MG/DL (ref 0.1–1.5)
BNP SERPL-MCNC: 595 PG/ML (ref 0–100)
BUN SERPL-MCNC: 19 MG/DL (ref 8–22)
CALCIUM SERPL-MCNC: 8.5 MG/DL (ref 8.5–10.5)
CHLORIDE SERPL-SCNC: 105 MMOL/L (ref 96–112)
CO2 SERPL-SCNC: 23 MMOL/L (ref 20–33)
COMMENT 1642: NORMAL
CREAT SERPL-MCNC: 1.25 MG/DL (ref 0.5–1.4)
EOSINOPHIL # BLD AUTO: 0.74 K/UL (ref 0–0.51)
EOSINOPHIL NFR BLD: 4.4 % (ref 0–6.9)
ERYTHROCYTE [DISTWIDTH] IN BLOOD BY AUTOMATED COUNT: 65.9 FL (ref 35.9–50)
GLOBULIN SER CALC-MCNC: 3.6 G/DL (ref 1.9–3.5)
GLUCOSE SERPL-MCNC: 135 MG/DL (ref 65–99)
HCT VFR BLD AUTO: 26.9 % (ref 42–52)
HGB BLD-MCNC: 7.2 G/DL (ref 14–18)
HYPOCHROMIA BLD QL SMEAR: ABNORMAL
IMM GRANULOCYTES # BLD AUTO: 0.85 K/UL (ref 0–0.11)
IMM GRANULOCYTES NFR BLD AUTO: 5.1 % (ref 0–0.9)
LG PLATELETS BLD QL SMEAR: NORMAL
LIPASE SERPL-CCNC: 26 U/L (ref 11–82)
LYMPHOCYTES # BLD AUTO: 1.22 K/UL (ref 1–4.8)
LYMPHOCYTES NFR BLD: 7.3 % (ref 22–41)
MCH RBC QN AUTO: 18.9 PG (ref 27–33)
MCHC RBC AUTO-ENTMCNC: 26.8 G/DL (ref 33.7–35.3)
MCV RBC AUTO: 70.6 FL (ref 81.4–97.8)
MICROCYTES BLD QL SMEAR: ABNORMAL
MONOCYTES # BLD AUTO: 1.03 K/UL (ref 0–0.85)
MONOCYTES NFR BLD AUTO: 6.1 % (ref 0–13.4)
MORPHOLOGY BLD-IMP: NORMAL
NEUTROPHILS # BLD AUTO: 12.86 K/UL (ref 1.82–7.42)
NEUTROPHILS NFR BLD: 76.4 % (ref 44–72)
NRBC # BLD AUTO: 0.1 K/UL
NRBC BLD-RTO: 0.6 /100 WBC
OVALOCYTES BLD QL SMEAR: NORMAL
PLATELET # BLD AUTO: 245 K/UL (ref 164–446)
PLATELET BLD QL SMEAR: NORMAL
PMV BLD AUTO: 10.1 FL (ref 9–12.9)
POIKILOCYTOSIS BLD QL SMEAR: NORMAL
POLYCHROMASIA BLD QL SMEAR: NORMAL
POTASSIUM SERPL-SCNC: 3.8 MMOL/L (ref 3.6–5.5)
PROT SERPL-MCNC: 7.2 G/DL (ref 6–8.2)
RBC # BLD AUTO: 3.81 M/UL (ref 4.7–6.1)
RBC BLD AUTO: PRESENT
SCHISTOCYTES BLD QL SMEAR: NORMAL
SODIUM SERPL-SCNC: 136 MMOL/L (ref 135–145)
WBC # BLD AUTO: 16.8 K/UL (ref 4.8–10.8)

## 2018-08-20 PROCEDURE — 36415 COLL VENOUS BLD VENIPUNCTURE: CPT

## 2018-08-20 PROCEDURE — 93005 ELECTROCARDIOGRAM TRACING: CPT | Performed by: EMERGENCY MEDICINE

## 2018-08-20 PROCEDURE — 85025 COMPLETE CBC W/AUTO DIFF WBC: CPT

## 2018-08-20 PROCEDURE — 93005 ELECTROCARDIOGRAM TRACING: CPT

## 2018-08-20 PROCEDURE — 80053 COMPREHEN METABOLIC PANEL: CPT

## 2018-08-20 PROCEDURE — 83690 ASSAY OF LIPASE: CPT

## 2018-08-20 PROCEDURE — 83880 ASSAY OF NATRIURETIC PEPTIDE: CPT

## 2018-08-20 PROCEDURE — 99285 EMERGENCY DEPT VISIT HI MDM: CPT

## 2018-08-21 ENCOUNTER — APPOINTMENT (OUTPATIENT)
Dept: RADIOLOGY | Facility: MEDICAL CENTER | Age: 53
DRG: 602 | End: 2018-08-21
Attending: STUDENT IN AN ORGANIZED HEALTH CARE EDUCATION/TRAINING PROGRAM
Payer: MEDICARE

## 2018-08-21 ENCOUNTER — APPOINTMENT (OUTPATIENT)
Dept: RADIOLOGY | Facility: MEDICAL CENTER | Age: 53
DRG: 602 | End: 2018-08-21
Attending: EMERGENCY MEDICINE
Payer: MEDICARE

## 2018-08-21 PROBLEM — Z91.148 NONCOMPLIANCE WITH MEDICATION REGIMEN: Status: RESOLVED | Noted: 2018-02-07 | Resolved: 2018-08-21

## 2018-08-21 PROBLEM — K92.2 GI BLEED: Status: ACTIVE | Noted: 2018-08-21

## 2018-08-21 PROBLEM — I50.9 CONGESTIVE HEART FAILURE (HCC): Status: ACTIVE | Noted: 2018-02-06

## 2018-08-21 PROBLEM — J22 LOWER RESPIRATORY INFECTION (E.G., BRONCHITIS, PNEUMONIA, PNEUMONITIS, PULMONITIS): Status: RESOLVED | Noted: 2018-03-23 | Resolved: 2018-08-21

## 2018-08-21 PROBLEM — I50.9 CHF (CONGESTIVE HEART FAILURE) (HCC): Status: RESOLVED | Noted: 2017-12-19 | Resolved: 2018-08-21

## 2018-08-21 PROBLEM — J45.909 ASTHMA: Status: RESOLVED | Noted: 2018-02-12 | Resolved: 2018-08-21

## 2018-08-21 PROBLEM — I50.30 (HFPEF) HEART FAILURE WITH PRESERVED EJECTION FRACTION (HCC): Status: ACTIVE | Noted: 2018-02-06

## 2018-08-21 PROBLEM — E87.1 HYPONATREMIA: Status: RESOLVED | Noted: 2017-04-30 | Resolved: 2018-08-21

## 2018-08-21 PROBLEM — L03.311 CELLULITIS OF ABDOMINAL WALL: Status: ACTIVE | Noted: 2018-08-21

## 2018-08-21 LAB
ABO GROUP BLD: NORMAL
APPEARANCE UR: CLEAR
BACTERIA #/AREA URNS HPF: NEGATIVE /HPF
BILIRUB UR QL STRIP.AUTO: NEGATIVE
BLD GP AB SCN SERPL QL: NORMAL
COLOR UR: YELLOW
CREAT UR-MCNC: 29.6 MG/DL
EPI CELLS #/AREA URNS HPF: NEGATIVE /HPF
GLUCOSE UR STRIP.AUTO-MCNC: NEGATIVE MG/DL
HEMOCCULT STL QL: NEGATIVE
HYALINE CASTS #/AREA URNS LPF: ABNORMAL /LPF
KETONES UR STRIP.AUTO-MCNC: NEGATIVE MG/DL
LEUKOCYTE ESTERASE UR QL STRIP.AUTO: NEGATIVE
MICRO URNS: ABNORMAL
NITRITE UR QL STRIP.AUTO: NEGATIVE
PH UR STRIP.AUTO: 5.5 [PH]
PROT UR QL STRIP: 30 MG/DL
RBC # URNS HPF: ABNORMAL /HPF
RBC UR QL AUTO: NEGATIVE
RH BLD: NORMAL
SODIUM UR-SCNC: 84 MMOL/L
SP GR UR STRIP.AUTO: 1.01
UROBILINOGEN UR STRIP.AUTO-MCNC: 1 MG/DL
WBC #/AREA URNS HPF: ABNORMAL /HPF

## 2018-08-21 PROCEDURE — 700101 HCHG RX REV CODE 250: Performed by: HOSPITALIST

## 2018-08-21 PROCEDURE — A9270 NON-COVERED ITEM OR SERVICE: HCPCS | Performed by: STUDENT IN AN ORGANIZED HEALTH CARE EDUCATION/TRAINING PROGRAM

## 2018-08-21 PROCEDURE — 700111 HCHG RX REV CODE 636 W/ 250 OVERRIDE (IP): Performed by: STUDENT IN AN ORGANIZED HEALTH CARE EDUCATION/TRAINING PROGRAM

## 2018-08-21 PROCEDURE — 81001 URINALYSIS AUTO W/SCOPE: CPT

## 2018-08-21 PROCEDURE — 770006 HCHG ROOM/CARE - MED/SURG/GYN SEMI*

## 2018-08-21 PROCEDURE — 86850 RBC ANTIBODY SCREEN: CPT

## 2018-08-21 PROCEDURE — 94760 N-INVAS EAR/PLS OXIMETRY 1: CPT

## 2018-08-21 PROCEDURE — 700101 HCHG RX REV CODE 250: Performed by: EMERGENCY MEDICINE

## 2018-08-21 PROCEDURE — 96365 THER/PROPH/DIAG IV INF INIT: CPT

## 2018-08-21 PROCEDURE — 700101 HCHG RX REV CODE 250: Performed by: STUDENT IN AN ORGANIZED HEALTH CARE EDUCATION/TRAINING PROGRAM

## 2018-08-21 PROCEDURE — 94640 AIRWAY INHALATION TREATMENT: CPT

## 2018-08-21 PROCEDURE — 86901 BLOOD TYPING SEROLOGIC RH(D): CPT

## 2018-08-21 PROCEDURE — 700105 HCHG RX REV CODE 258: Performed by: STUDENT IN AN ORGANIZED HEALTH CARE EDUCATION/TRAINING PROGRAM

## 2018-08-21 PROCEDURE — 700105 HCHG RX REV CODE 258: Performed by: EMERGENCY MEDICINE

## 2018-08-21 PROCEDURE — 700111 HCHG RX REV CODE 636 W/ 250 OVERRIDE (IP): Performed by: EMERGENCY MEDICINE

## 2018-08-21 PROCEDURE — 700102 HCHG RX REV CODE 250 W/ 637 OVERRIDE(OP): Performed by: STUDENT IN AN ORGANIZED HEALTH CARE EDUCATION/TRAINING PROGRAM

## 2018-08-21 PROCEDURE — 99223 1ST HOSP IP/OBS HIGH 75: CPT | Mod: AI,GC | Performed by: HOSPITALIST

## 2018-08-21 PROCEDURE — 96375 TX/PRO/DX INJ NEW DRUG ADDON: CPT

## 2018-08-21 PROCEDURE — 86900 BLOOD TYPING SEROLOGIC ABO: CPT

## 2018-08-21 PROCEDURE — 82272 OCCULT BLD FECES 1-3 TESTS: CPT

## 2018-08-21 PROCEDURE — 74177 CT ABD & PELVIS W/CONTRAST: CPT

## 2018-08-21 PROCEDURE — 84300 ASSAY OF URINE SODIUM: CPT

## 2018-08-21 PROCEDURE — 96366 THER/PROPH/DIAG IV INF ADDON: CPT

## 2018-08-21 PROCEDURE — 700117 HCHG RX CONTRAST REV CODE 255: Performed by: EMERGENCY MEDICINE

## 2018-08-21 PROCEDURE — 71045 X-RAY EXAM CHEST 1 VIEW: CPT

## 2018-08-21 PROCEDURE — 82570 ASSAY OF URINE CREATININE: CPT

## 2018-08-21 PROCEDURE — 96367 TX/PROPH/DG ADDL SEQ IV INF: CPT

## 2018-08-21 RX ORDER — AMIODARONE HYDROCHLORIDE 200 MG/1
200 TABLET ORAL DAILY
Status: DISCONTINUED | OUTPATIENT
Start: 2018-08-21 | End: 2018-08-22 | Stop reason: HOSPADM

## 2018-08-21 RX ORDER — FUROSEMIDE 10 MG/ML
20 INJECTION INTRAMUSCULAR; INTRAVENOUS ONCE
Status: COMPLETED | OUTPATIENT
Start: 2018-08-21 | End: 2018-08-21

## 2018-08-21 RX ORDER — BUDESONIDE AND FORMOTEROL FUMARATE DIHYDRATE 80; 4.5 UG/1; UG/1
2 AEROSOL RESPIRATORY (INHALATION) 2 TIMES DAILY
Status: DISCONTINUED | OUTPATIENT
Start: 2018-08-21 | End: 2018-08-21

## 2018-08-21 RX ORDER — ALBUTEROL SULFATE 90 UG/1
2 AEROSOL, METERED RESPIRATORY (INHALATION) EVERY 6 HOURS PRN
Status: DISCONTINUED | OUTPATIENT
Start: 2018-08-21 | End: 2018-08-22 | Stop reason: HOSPADM

## 2018-08-21 RX ORDER — IPRATROPIUM BROMIDE AND ALBUTEROL SULFATE 2.5; .5 MG/3ML; MG/3ML
3 SOLUTION RESPIRATORY (INHALATION) EVERY 6 HOURS PRN
Status: DISCONTINUED | OUTPATIENT
Start: 2018-08-21 | End: 2018-08-22 | Stop reason: HOSPADM

## 2018-08-21 RX ORDER — BISACODYL 10 MG
10 SUPPOSITORY, RECTAL RECTAL
Status: DISCONTINUED | OUTPATIENT
Start: 2018-08-21 | End: 2018-08-22 | Stop reason: HOSPADM

## 2018-08-21 RX ORDER — IPRATROPIUM BROMIDE AND ALBUTEROL SULFATE 2.5; .5 MG/3ML; MG/3ML
3 SOLUTION RESPIRATORY (INHALATION)
Status: DISCONTINUED | OUTPATIENT
Start: 2018-08-21 | End: 2018-08-22 | Stop reason: HOSPADM

## 2018-08-21 RX ORDER — BUDESONIDE AND FORMOTEROL FUMARATE DIHYDRATE 80; 4.5 UG/1; UG/1
2 AEROSOL RESPIRATORY (INHALATION)
Status: DISCONTINUED | OUTPATIENT
Start: 2018-08-21 | End: 2018-08-22 | Stop reason: HOSPADM

## 2018-08-21 RX ORDER — ENALAPRILAT 1.25 MG/ML
1.25 INJECTION INTRAVENOUS EVERY 6 HOURS PRN
Status: DISCONTINUED | OUTPATIENT
Start: 2018-08-21 | End: 2018-08-22 | Stop reason: HOSPADM

## 2018-08-21 RX ORDER — AMOXICILLIN 250 MG
2 CAPSULE ORAL 2 TIMES DAILY
Status: DISCONTINUED | OUTPATIENT
Start: 2018-08-21 | End: 2018-08-22 | Stop reason: HOSPADM

## 2018-08-21 RX ORDER — POLYETHYLENE GLYCOL 3350 17 G/17G
1 POWDER, FOR SOLUTION ORAL
Status: DISCONTINUED | OUTPATIENT
Start: 2018-08-21 | End: 2018-08-22 | Stop reason: HOSPADM

## 2018-08-21 RX ORDER — FUROSEMIDE 40 MG/1
40 TABLET ORAL
Status: DISCONTINUED | OUTPATIENT
Start: 2018-08-21 | End: 2018-08-22 | Stop reason: HOSPADM

## 2018-08-21 RX ORDER — ROSUVASTATIN CALCIUM 20 MG/1
40 TABLET, COATED ORAL EVERY EVENING
Status: DISCONTINUED | OUTPATIENT
Start: 2018-08-21 | End: 2018-08-22 | Stop reason: HOSPADM

## 2018-08-21 RX ADMIN — CEFTRIAXONE SODIUM 2 G: 2 INJECTION, POWDER, FOR SOLUTION INTRAMUSCULAR; INTRAVENOUS at 02:57

## 2018-08-21 RX ADMIN — IPRATROPIUM BROMIDE AND ALBUTEROL SULFATE 3 ML: .5; 3 SOLUTION RESPIRATORY (INHALATION) at 07:40

## 2018-08-21 RX ADMIN — ALBUTEROL SULFATE 2 PUFF: 90 AEROSOL, METERED RESPIRATORY (INHALATION) at 21:26

## 2018-08-21 RX ADMIN — FUROSEMIDE 40 MG: 40 TABLET ORAL at 06:17

## 2018-08-21 RX ADMIN — BUDESONIDE AND FORMOTEROL FUMARATE DIHYDRATE 2 PUFF: 80; 4.5 AEROSOL RESPIRATORY (INHALATION) at 06:50

## 2018-08-21 RX ADMIN — ALBUTEROL SULFATE 2 PUFF: 90 AEROSOL, METERED RESPIRATORY (INHALATION) at 12:16

## 2018-08-21 RX ADMIN — IOHEXOL 100 ML: 350 INJECTION, SOLUTION INTRAVENOUS at 01:50

## 2018-08-21 RX ADMIN — IPRATROPIUM BROMIDE AND ALBUTEROL SULFATE 3 ML: .5; 3 SOLUTION RESPIRATORY (INHALATION) at 23:59

## 2018-08-21 RX ADMIN — ROSUVASTATIN CALCIUM 40 MG: 20 TABLET, FILM COATED ORAL at 18:10

## 2018-08-21 RX ADMIN — BUDESONIDE AND FORMOTEROL FUMARATE DIHYDRATE 2 PUFF: 80; 4.5 AEROSOL RESPIRATORY (INHALATION) at 20:00

## 2018-08-21 RX ADMIN — VANCOMYCIN HYDROCHLORIDE 2000 MG: 100 INJECTION, POWDER, LYOPHILIZED, FOR SOLUTION INTRAVENOUS at 17:00

## 2018-08-21 RX ADMIN — ALBUTEROL SULFATE 2.5 MG: 2.5 SOLUTION RESPIRATORY (INHALATION) at 00:53

## 2018-08-21 RX ADMIN — IPRATROPIUM BROMIDE AND ALBUTEROL SULFATE 3 ML: .5; 3 SOLUTION RESPIRATORY (INHALATION) at 16:15

## 2018-08-21 RX ADMIN — AMIODARONE HYDROCHLORIDE 200 MG: 200 TABLET ORAL at 06:17

## 2018-08-21 RX ADMIN — FUROSEMIDE 20 MG: 10 INJECTION, SOLUTION INTRAMUSCULAR; INTRAVENOUS at 01:57

## 2018-08-21 RX ADMIN — DOCUSATE SODIUM -SENNOSIDES 2 TABLET: 50; 8.6 TABLET, COATED ORAL at 06:17

## 2018-08-21 RX ADMIN — VANCOMYCIN HYDROCHLORIDE 3000 MG: 100 INJECTION, POWDER, LYOPHILIZED, FOR SOLUTION INTRAVENOUS at 03:33

## 2018-08-21 ASSESSMENT — PAIN SCALES - GENERAL
PAINLEVEL_OUTOF10: 0
PAINLEVEL_OUTOF10: ASSUMED PAIN PRESENT
PAINLEVEL_OUTOF10: ASSUMED PAIN PRESENT

## 2018-08-21 ASSESSMENT — ENCOUNTER SYMPTOMS
FLANK PAIN: 0
FEVER: 0
BLURRED VISION: 0
ABDOMINAL PAIN: 0
WEAKNESS: 0
CHILLS: 0
MYALGIAS: 0
COUGH: 1
FOCAL WEAKNESS: 0
DOUBLE VISION: 0
SHORTNESS OF BREATH: 1
WHEEZING: 1
BLOOD IN STOOL: 1
ORTHOPNEA: 1
SINUS PAIN: 0
HALLUCINATIONS: 0
NERVOUS/ANXIOUS: 0
NAUSEA: 0
NECK PAIN: 0
SORE THROAT: 0
DIARRHEA: 0
SPUTUM PRODUCTION: 0
PALPITATIONS: 0
BACK PAIN: 0
BRUISES/BLEEDS EASILY: 0
VOMITING: 0
HEMOPTYSIS: 0
WEIGHT LOSS: 0
HEARTBURN: 0
BLOOD IN STOOL: 0
DIZZINESS: 0
DEPRESSION: 0
PND: 1
CLAUDICATION: 0
SEIZURES: 0
INSOMNIA: 0
CONSTIPATION: 1
PHOTOPHOBIA: 0
HEADACHES: 0
DIAPHORESIS: 0

## 2018-08-21 ASSESSMENT — LIFESTYLE VARIABLES
DO YOU DRINK ALCOHOL: NO
EVER_SMOKED: YES
SUBSTANCE_ABUSE: 0

## 2018-08-21 ASSESSMENT — COPD QUESTIONNAIRES
DO YOU EVER COUGH UP ANY MUCUS OR PHLEGM?: NO/ONLY WITH OCCASIONAL COLDS OR INFECTIONS
DURING THE PAST 4 WEEKS HOW MUCH DID YOU FEEL SHORT OF BREATH: NONE/LITTLE OF THE TIME
COPD SCREENING SCORE: 4
HAVE YOU SMOKED AT LEAST 100 CIGARETTES IN YOUR ENTIRE LIFE: YES

## 2018-08-21 NOTE — PROGRESS NOTES
"Pharmacy Kinetics 52 y.o. male on vancomycin day # 2 2018    Currently on Vancomycin 2000 mg iv q12hr    Indication for Treatment: cellulitis     Pertinent history per medical record: Admitted on 2018 for cellulitis of abdominal wall.  Patient presents with abdominal distension x3 days with associated shortness of breath and constipation.    Other antibiotics: none    Allergies: Erythromycin; Cillins [penicillins]; Metoprolol; and Shellfish allergy     List concerns for renal function: CHF, obesity (BMI ~ 39), contrast on     Pertinent cultures to date:   none    Recent Labs      18   2200   WBC  16.8*   NEUTSPOLYS  76.40*     Recent Labs      18   2200   BUN  19   CREATININE  1.25   ALBUMIN  3.6     No results for input(s): VANCOTROUGH, VANCOPEAK, VANCORANDOM in the last 72 hours.  Intake/Output Summary (Last 24 hours) at 18 1402  Last data filed at 18 0333   Gross per 24 hour   Intake                0 ml   Output              800 ml   Net             -800 ml      Blood pressure 114/62, pulse 74, temperature 36.5 °C (97.7 °F), resp. rate 18, height 1.956 m (6' 5\"), weight (!) 148.9 kg (328 lb 4.2 oz), SpO2 95 %. Temp (24hrs), Av.8 °C (98.2 °F), Min:36.5 °C (97.7 °F), Max:37 °C (98.6 °F)      A/P   1. Vancomycin dose change: Not indicated at this time  2. Next vancomycin level: 0330 tomorrow  3. Goal trough: 12-16 mcg/mL  4. Comments: This patient has multiple risk factors for vancomycin accumulation. I have scheduled a dose prior to the 3rd total dose tomorrow morning.    Kinjal Gonzalez, PharmD.      "

## 2018-08-21 NOTE — PROGRESS NOTES
Into unit with transport per [facility]. With complaints of shortness of breath. Inspiratory wheezing heard upon auscultation on the upper lung fields. No complaints of chest or abdominal pain at the moment.     2 RN skin check done with MIN Mix. Redness but blanching on the lower abdominal quadrants. Dark brown discoloration on bilateral shin area.    Oriented to unit. Call light within easy reach. Hourly rounding in place. Clustered nursing interventions to promote rest. Instructed to call for assistance whenever needed.

## 2018-08-21 NOTE — ASSESSMENT & PLAN NOTE
Suspected  - Pt had episodes of bleeding requiring transfusion of 5 units of PRBCs on last admission, EGD and Colonoscopy were negative  - Hb 7.2, MCV 70  - Pt not on antiplatelet medication, DVT prophylaxis held, pt on SCDs  - FOBT negative

## 2018-08-21 NOTE — CONSULTS
Reason of Consult: Heart failure and atrial fibrillation    Consulting Physician: Dr.Steven ROBBIE Monge    HPI:  52-year-old gentleman with a history of atrial fibrillation previously on anticoagulation, ischemic cardiomyopathy status post CABG with a baseline EF of 30% hypertension and hyperlipidemia.  Was in the hospital recently for massive lower GI bleed requiring multiple units of transfusion and concomitant hypotension.  He was transfused and all of his cardiac medications were withheld due to hypotension.  He was discharged in this way and returns now being treated for abdominal cellulitis.  He indicates that initially his abdomen was tense red hot painful and now this is improving.  That was his reason for admission.  He denied any change in lower extremity edema exertional dyspnea and denies PND or orthopnea.  His hemoglobin remains low at 7.2.  Mild orthopnea but no PND.  Does endorse lower extremity edema.  Former smoker does not use drugs or alcohol.  Reviewed family history, none that is relevant to his current admission.    Past Medical History:   Diagnosis Date   • ASTHMA    • Atrial fibrillation (HCC)    • CAD (coronary artery disease)    • Chronic obstructive pulmonary disease (HCC)    • Diabetes        Social History     Social History   • Marital status:      Spouse name: N/A   • Number of children: N/A   • Years of education: N/A     Occupational History   • Not on file.     Social History Main Topics   • Smoking status: Former Smoker     Packs/day: 0.50     Years: 30.00     Types: Cigarettes     Quit date: 12/13/2017   • Smokeless tobacco: Never Used      Comment: 1/2-1.5 packs for 30 years   • Alcohol use No   • Drug use: No   • Sexual activity: Not on file     Other Topics Concern   • Not on file     Social History Narrative   • No narrative on file       No current facility-administered medications on file prior to encounter.      Current Outpatient Prescriptions on File Prior to  Encounter   Medication Sig Dispense Refill   • ipratropium-albuterol (DUONEB) 0.5-2.5 (3) MG/3ML nebulizer solution 3 mL by Nebulization route every 6 hours as needed for Shortness of Breath. 60 Bullet 6   • budesonide-formoterol (SYMBICORT) 80-4.5 MCG/ACT Aerosol Inhale 2 Puffs by mouth 2 Times a Day. 2 Inhaler 6   • amiodarone (CORDARONE) 200 MG Tab Take 1 Tab by mouth every day. 90 Tab 3   • rosuvastatin (CRESTOR) 40 MG tablet Take 1 Tab by mouth every evening. 90 Tab 3   • albuterol 108 (90 Base) MCG/ACT Aero Soln inhalation aerosol Inhale 2 Puffs by mouth every 6 hours as needed for Shortness of Breath. 1 Inhaler 10       Current Facility-Administered Medications   Medication Dose Frequency Provider Last Rate Last Dose   • senna-docusate (PERICOLACE or SENOKOT S) 8.6-50 MG per tablet 2 Tab  2 Tab BID Regino Monge M.D.   2 Tab at 08/21/18 0617    And   • polyethylene glycol/lytes (MIRALAX) PACKET 1 Packet  1 Packet QDAY PRN Regino Monge M.D.        And   • magnesium hydroxide (MILK OF MAGNESIA) suspension 30 mL  30 mL QDAY PRN Regino Monge M.D.        And   • bisacodyl (DULCOLAX) suppository 10 mg  10 mg QDAY PRN Regino Monge M.D.       • Respiratory Care per Protocol   Continuous RT Regino Monge M.D.       • enalaprilat (VASOTEC) injection 1.25 mg  1.25 mg Q6HRS PRN Regino Monge M.D.       • albuterol inhaler 2 Puff  2 Puff Q6HRS PRN Regino Monge M.D.   2 Puff at 08/21/18 1216   • amiodarone (CORDARONE) tablet 200 mg  200 mg DAILY Regino Monge M.D.   200 mg at 08/21/18 0617   • budesonide-formoterol (SYMBICORT) 80-4.5 MCG/ACT inhaler 2 Puff  2 Puff BID Regino Monge M.D.   2 Puff at 08/21/18 0650   • ipratropium-albuterol (DUONEB) nebulizer solution  3 mL Q6HRS PRN Regino Monge M.D.   3 mL at 08/21/18 0740   • rosuvastatin (CRESTOR) tablet 40 mg  40 mg Q EVENING Regino Monge M.D.       • furosemide (LASIX) tablet 40 mg  40 mg Q DAY Regino Monge M.D.   40 mg at  "08/21/18 0617   • MD ALERT... vancomycin per pharmacy protocol   pharmacy to dose Regino Monge M.D.       • vancomycin 2,000 mg in  mL IVPB  2,000 mg Q12HR Regino Monge M.D.       • IRON REPLACEMENT per pharmacy protocol   PAYALN Miguelito Arenas M.D.         Last reviewed on 7/24/2018  4:41 PM by Martine Robison T    Erythromycin; Cillins [penicillins]; Metoprolol; and Shellfish allergy    Family History   Problem Relation Age of Onset   • Diabetes Mother    • Stroke Mother    • Leukemia Mother    • Diabetes Father    • No Known Problems Sister    • Heart Disease Brother         PPM   • Lung Disease Brother    • Alcohol/Drug Brother    • Diabetes Sister        ROS: As per HPI all other systems reviewed and negative     Physical Exam   Blood pressure 114/62, pulse 74, temperature 36.5 °C (97.7 °F), resp. rate 18, height 1.956 m (6' 5\"), weight (!) 148.9 kg (328 lb 4.2 oz), SpO2 95 %.    Constitutional: Appears well-developed.   HENT: Normocephalic and atraumatic. No scleral icterus.   Neck: No JVD present.   Cardiovascular: Normal rate. Exam reveals no gallop and no friction rub. No murmur heard.   Pulmonary/Chest: CTAB    Abdominal: S/NT/ND BS+   Musculoskeletal:  Pulses present. No atrophy. Strength normal.  Extremities: Exhibits 2+ chronic bilateral lower extremity edema with chronic venous stasis changes.  No clubbing or cyanosis.   Skin: Skin is warm and dry.   Neuro: Non-focal, CN 2-12 intact grossly      Intake/Output Summary (Last 24 hours) at 08/21/18 1601  Last data filed at 08/21/18 0333   Gross per 24 hour   Intake                0 ml   Output              800 ml   Net             -800 ml       Recent Labs      08/20/18   2200   WBC  16.8*   RBC  3.81*   HEMOGLOBIN  7.2*   HEMATOCRIT  26.9*   MCV  70.6*   MCH  18.9*   MCHC  26.8*   RDW  65.9*   PLATELETCT  245   MPV  10.1     Recent Labs      08/20/18   2200   SODIUM  136   POTASSIUM  3.8   CHLORIDE  105   CO2  23   GLUCOSE  135*   BUN  19 "   CREATININE  1.25   CALCIUM  8.5         Recent Labs      08/20/18   2200   BNPBTYPENAT  595*     Recent Labs      08/20/18   2200   BNPBTYPENAT  595*           EKG (8/20/2018):  I have personally reviewed the EKG this visit and discussed with the patient. It shows sinus rhythm, premature atrial contraction.    Imaging reviewed    Impressions:  1.  Abdominal wall cellulitis  2.  Borderline decompensate heart failure, recovered LVEF post revascularization  3.  Blood loss anemia  4.  Paroxysmal atrial fibrillation  5.  Recent massive GI bleeding  6.  Ischemic cardiomyopathy status post CABG January 2018    Recommendations:  Recommend treating his primary reason for admission which is his abdominal wall cellulitis.  This is improving.  This is ongoing.  He has a very mild incidental exacerbation of heart failure likely due to a failure to reinitiate his original medical therapy.  His prior echocardiogram shows stable ischemic cardiomyopathy but the most recent had a improved EF which was a byproduct of his revascularization, low systemic vascular resistance and vaso-active agents.      Suggest initiating Toprol-XL 25 mg daily as well as spironolactone  Gentle intravenous diuresis with Lasix as tolerated by hemodynamics then transition to p.o.  Continue amiodarone  Not a candidate for antiplatelet or anticoagulant therapy due to recent GI bleeding and ongoing anemia  Recommend initiation of lisinopril 2.5 mg daily as tolerated by blood pressure  Recommend statin therapy  Ongoing therapy for primary reason of admission, cellulitis  No clear indication for echocardiogram this hospital stay.  He really has an outpatient echo marilyn Moon scheduled.  I suggest he keep that appointment.    Thank you for this interesting consultation. It was my pleasure to see Joshua Medrano today.    Romaine Casey MD, FACC, [facility]  Division of Interventional Cardiology  [facility]

## 2018-08-21 NOTE — ASSESSMENT & PLAN NOTE
Improved/Resolved  -Treating for cellulitis, possible lymphedema per CT abdomen  -Patient reports improvement in swelling, denies tenderness, only mild erythema present  - No emphysematous pockets per CT scan  - Vancomycin IV per protocol, discontinued on discharge

## 2018-08-21 NOTE — PROGRESS NOTES
"Patient had refused labs earlier this morning (BMP and CBC w/ diff) at the beginning of the shift stating \"They already came and fouzia my blood earlier this morning. I don't see why they need to do it again.\" RN explained to patient that the doctor had ordered more labs to be drawn that were different than the ones that were drawn this morning. Patient replied, \"Well i'm tired of being poked so many times. Have them come back later.\" Phlebotomist stated that they would come back again to try at 1130 and patient was agreeable.     New order for peripheral lab draw at 0943 for iron. Phlebotomy up to see patient for blood draw. Attempt was made x1 but unable to obtain sample per phlebotomy and patient refused all blood draws. RN went in to see patient about this and the patient stated \"I'm done. I'm not doing this anymore. My arms and hands are sore and they hurt. No one's taking any more blood until I talk to the doctor.\" RN paged the [facility] resident on call for Yellow team at 1030. Berta Roque and Ron at bedside with [facility] yellow team to round on patient.   "

## 2018-08-21 NOTE — H&P
Internal Medicine Admitting History and Physical    Note Author: Regino Monge M.D.       Name Joshua Medrano     1965   Age/Sex 52 y.o. male   MRN [id number]   Code Status Full     After 5PM or if no immediate response to page, please call for cross-coverage  Attending/Team: Dr Velasquez/ Camlio See Patient List for primary contact information  Call [phone] to page    1st Call - Day Intern (R1):   Dr Roque 2nd Call - Day Sr. Resident (R2/R3):   Dr Arenas       Chief Complaint:   Abdominal swelling and shortness of breath x few days    HPI:  Pt is a 52 yr old male with PMH significant for HFpEF, S/P triple vessel CABG, COPD and recent GI bleed who presented to the ER last night for acutely worsening shortness of breath and swelling of the abdomen. The swelling was of sudden onset and rapid progression, without pain or tenderness but associated with redness of the skin of the anterior abdominal wall. He also reports bilateral leg swelling that is chronic but has also increased in the past few days. H underwent triple bypass surgery last December and since then had been able to walk a couple of blocks without getting dyspneic and even climb a few flights of stairs; this capacity reduced in recent days to the point where he called paramedics to bring him to the hospital. He attributed this shortness of breath and dry cough to the ambient smoke. He received nebulizer treatment and Lasix in the ER following which he reports that his clinical condition is much improved.  He endorses a long smoking history of 40 pack years and does not presently smoke, he does use inhalation treatments on a regular basis at home for COPD. He denies abdominal pain and diarrhea but reports having some constipation.  About 2 weeks ago he underwent colonoscopy and EGD because of large amount of blood loss through the colon; no cause was identified and the bleeding stopped spontaneously. He was given 5 units of blood  during this time and his Hb continues to trend low. He does not report weakness, malaise or chest pain.  A CT scan of his abdomen in the  ER showed features suggestive of abdominal wall cellulitis, diverticulosis, hepatosplenomegaly and ventral hernia.  He denies lightheadedness, palpitations, headache, blurred vision, falls, LOC or leg cramps.        Review of Systems   Constitutional: Negative for chills, diaphoresis, fever, malaise/fatigue and weight loss.   HENT: Negative for congestion, nosebleeds, sinus pain and sore throat.    Eyes: Negative for blurred vision, double vision and photophobia.   Respiratory: Positive for cough and shortness of breath. Negative for sputum production.    Cardiovascular: Positive for orthopnea, leg swelling and PND. Negative for chest pain, palpitations and claudication.   Gastrointestinal: Positive for blood in stool and constipation. Negative for abdominal pain, diarrhea, nausea and vomiting.   Genitourinary: Negative for dysuria, frequency, hematuria and urgency.   Musculoskeletal: Negative for back pain, joint pain, myalgias and neck pain.   Skin: Negative for itching and rash.   Neurological: Negative for focal weakness, seizures, weakness and headaches.   Endo/Heme/Allergies: Negative for environmental allergies. Does not bruise/bleed easily.   Psychiatric/Behavioral: Negative for depression, hallucinations, substance abuse and suicidal ideas. The patient is not nervous/anxious and does not have insomnia.              Past Medical History (Chronic medical problem, known complications and current treatment)    Longstanding CAD, HFpEF, post triple vessel CABG, DLD, COPD     Past Surgical History:  Past Surgical History:   Procedure Laterality Date   • COLONOSCOPY - ENDO N/A 7/25/2018    Procedure: COLONOSCOPY - ENDO;  Surgeon: Josiah Molina D.O.;  Location: ENDOSCOPY [facility];  Service: Gastroenterology   • THORACOSCOPY Left 1/25/2018    Procedure: THORACOSCOPY-  PLEURODESIS ;  Surgeon: Chandu Paez M.D.;  Location: SURGERY [facility];  Service: General   • MULTIPLE CORONARY ARTERY BYPASS ENDO VEIN HARVEST  12/22/2017    Procedure: MULTIPLE CORONARY ARTERY BYPASS ENDO VEIN HARVEST X2;  Surgeon: Kiel Ash M.D.;  Location: SURGERY [facility];  Service: Cardiothoracic   • JIM  12/22/2017    Procedure: JIM;  Surgeon: Kiel Ash M.D.;  Location: SURGERY [facility];  Service: Cardiothoracic   • OTHER ABDOMINAL SURGERY         Current Outpatient Medications:  Home Medications    **Home medications have not yet been reviewed for this encounter**         Medication Allergy/Sensitivities:  Allergies   Allergen Reactions   • Erythromycin Anaphylaxis     Rxn - 1994   • Cillins [Penicillins] Anaphylaxis and Rash     As a child, tolerated cefepime (12/2017), ceftriaxone (1/2018), cefazolin (12/2017)   • Metoprolol      Pt stated got l[place] and aggressive    • Shellfish Allergy Anaphylaxis     Pt stated that he is allergic to all seafood, will develop a rash and says that rash will clear up with benadryl         Family History (mandatory)   Family History   Problem Relation Age of Onset   • Diabetes Mother    • Stroke Mother    • Leukemia Mother    • Diabetes Father    • No Known Problems Sister    • Heart Disease Brother         PPM   • Lung Disease Brother    • Alcohol/Drug Brother    • Diabetes Sister        Social History (mandatory)   Social History     Social History   • Marital status:      Spouse name: N/A   • Number of children: N/A   • Years of education: N/A     Occupational History   • Not on file.     Social History Main Topics   • Smoking status: Former Smoker     Packs/day: 0.50     Years: 30.00     Types: Cigarettes     Quit date: 12/13/2017   • Smokeless tobacco: Never Used      Comment: 1/2-1.5 packs for 30 years   • Alcohol use No   • Drug use: No   • Sexual activity: Not on file     Other Topics Concern   • Not on file     Social  "History Narrative   • No narrative on file     Living situation: Lives with wife  PCP : Nam Le M.D.    Physical Exam     Vitals:    08/21/18 0332 08/21/18 0400 08/21/18 0430 08/21/18 0500   BP:       Pulse: 66 64 71 66   Resp: (!) 21 18 (!) 22 19   Temp:       SpO2: 94% 94% 96% 97%   Weight:       Height:         Body mass index is 39.71 kg/m².  /71   Pulse 66   Temp 36.8 °C (98.3 °F)   Resp 19   Ht 1.956 m (6' 5\")   Wt (!) 151.9 kg (334 lb 14.1 oz)   SpO2 97%   BMI 39.71 kg/m²   O2 therapy: Pulse Oximetry: 97 %, O2 (LPM): 2    Physical Exam   Constitutional: He is oriented to person, place, and time and well-developed, well-nourished, and in no distress. No distress.   HENT:   Head: Normocephalic and atraumatic.   Right Ear: External ear normal.   Left Ear: External ear normal.   Nose: Nose normal.   Eyes: Conjunctivae and EOM are normal. Right eye exhibits no discharge. Left eye exhibits no discharge. No scleral icterus.   Neck: Normal range of motion. Neck supple. No thyromegaly present.   Cardiovascular: Normal rate, regular rhythm and normal heart sounds.  Exam reveals no gallop and no friction rub.    No murmur heard.  Pulmonary/Chest: Effort normal and breath sounds normal. No respiratory distress. He has no wheezes. He has no rales.   Abdominal: Soft. He exhibits distension. He exhibits no mass. There is no tenderness. There is no rebound and no guarding.   Musculoskeletal: Normal range of motion. He exhibits edema. He exhibits no tenderness or deformity.   Bilateral pedal edema with hyperpigmented induration of ankles b/l   Lymphadenopathy:     He has no cervical adenopathy.   Neurological: He is alert and oriented to person, place, and time. He has normal reflexes. He exhibits normal muscle tone. GCS score is 15.   Skin: Skin is warm and dry. He is not diaphoretic. No erythema. There is pallor.   Pale conjunctivae   Psychiatric: Mood, memory, affect and judgment normal.   Vitals " reviewed.        Data Review       Old Records Request:   Completed  Current Records review/summary: Completed    Lab Data Review:  Recent Results (from the past 24 hour(s))   CBC WITH DIFFERENTIAL    Collection Time: 08/20/18 10:00 PM   Result Value Ref Range    WBC 16.8 (H) 4.8 - 10.8 K/uL    RBC 3.81 (L) 4.70 - 6.10 M/uL    Hemoglobin 7.2 (L) 14.0 - 18.0 g/dL    Hematocrit 26.9 (L) 42.0 - 52.0 %    MCV 70.6 (L) 81.4 - 97.8 fL    MCH 18.9 (L) 27.0 - 33.0 pg    MCHC 26.8 (L) 33.7 - 35.3 g/dL    RDW 65.9 (H) 35.9 - 50.0 fL    Platelet Count 245 164 - 446 K/uL    MPV 10.1 9.0 - 12.9 fL    Neutrophils-Polys 76.40 (H) 44.00 - 72.00 %    Lymphocytes 7.30 (L) 22.00 - 41.00 %    Monocytes 6.10 0.00 - 13.40 %    Eosinophils 4.40 0.00 - 6.90 %    Basophils 0.70 0.00 - 1.80 %    Immature Granulocytes 5.10 (H) 0.00 - 0.90 %    Nucleated RBC 0.60 /100 WBC    Lymphs (Absolute) 1.22 1.00 - 4.80 K/uL    Monos (Absolute) 1.03 (H) 0.00 - 0.85 K/uL    Eos (Absolute) 0.74 (H) 0.00 - 0.51 K/uL    Baso (Absolute) 0.11 0.00 - 0.12 K/uL    Immature Granulocytes (abs) 0.85 (H) 0.00 - 0.11 K/uL    NRBC (Absolute) 0.10 K/uL    Neutrophils (Absolute) 12.86 (H) 1.82 - 7.42 K/uL    Hypochromia 2+     Anisocytosis 3+     Microcytosis 2+    COMP METABOLIC PANEL    Collection Time: 08/20/18 10:00 PM   Result Value Ref Range    Sodium 136 135 - 145 mmol/L    Potassium 3.8 3.6 - 5.5 mmol/L    Chloride 105 96 - 112 mmol/L    Co2 23 20 - 33 mmol/L    Anion Gap 8.0 0.0 - 11.9    Glucose 135 (H) 65 - 99 mg/dL    Bun 19 8 - 22 mg/dL    Creatinine 1.25 0.50 - 1.40 mg/dL    Calcium 8.5 8.5 - 10.5 mg/dL    AST(SGOT) 14 12 - 45 U/L    ALT(SGPT) 12 2 - 50 U/L    Alkaline Phosphatase 103 (H) 30 - 99 U/L    Total Bilirubin 0.5 0.1 - 1.5 mg/dL    Albumin 3.6 3.2 - 4.9 g/dL    Total Protein 7.2 6.0 - 8.2 g/dL    Globulin 3.6 (H) 1.9 - 3.5 g/dL    A-G Ratio 1.0 g/dL   LIPASE    Collection Time: 08/20/18 10:00 PM   Result Value Ref Range    Lipase 26 11 - 82 U/L    BNP    Collection Time: 18 10:00 PM   Result Value Ref Range    B Natriuretic Peptide 595 (H) 0 - 100 pg/mL   ESTIMATED GFR    Collection Time: 18 10:00 PM   Result Value Ref Range    GFR If African American >60 >60 mL/min/1.73 m 2    GFR If Non African American >60 >60 mL/min/1.73 m 2   PERIPHERAL SMEAR REVIEW    Collection Time: 18 10:00 PM   Result Value Ref Range    Peripheral Smear Review see below    PLATELET ESTIMATE    Collection Time: 18 10:00 PM   Result Value Ref Range    Plt Estimation Normal    MORPHOLOGY    Collection Time: 18 10:00 PM   Result Value Ref Range    RBC Morphology Present     Large Platelets 1+     Polychromia 1+     Poikilocytosis 2+     Ovalocytes 2+     Schistocytes 1+    DIFFERENTIAL COMMENT    Collection Time: 18 10:00 PM   Result Value Ref Range    Comments-Diff see below    EKG (NOW)    Collection Time: 18 10:04 PM   Result Value Ref Range    Report       [facility]    Test Date:  2018  Pt Name:    JARRETT WHITE                   Department: ER  MRN:        [id number]                      Room:  Gender:     Male                         Technician: [id number]  :        1965                   Requested By:ER TRIAGE PROTOCOL  Order #:    [id number]                    Reading MD:    Measurements  Intervals                                Axis  Rate:       68                           P:          124  NM:         208                          QRS:        -46  QRSD:       104                          T:          82  QT:         456  QTc:        486    Interpretive Statements  SINUS RHYTHM  ATRIAL PREMATURE COMPLEX  BORDERLINE AV CONDUCTION DELAY  LAD, CONSIDER LEFT ANTERIOR FASCICULAR BLOCK  LATE PRECORDIAL R/S TRANSITION  BORDERLINE PROLONGED QT INTERVAL  Compared to ECG 2018 23:09:57  Atrial premature complex(es) now present  T-wave abnormality no longer present     URINALYSIS,CULTURE IF INDICATED     Collection Time: 08/21/18  2:00 AM   Result Value Ref Range    Color Yellow     Character Clear     Specific Gravity 1.012 <1.035    Ph 5.5 5.0 - 8.0    Glucose Negative Negative mg/dL    Ketones Negative Negative mg/dL    Protein 30 (A) Negative mg/dL    Bilirubin Negative Negative    Urobilinogen, Urine 1.0 Negative    Nitrite Negative Negative    Leukocyte Esterase Negative Negative    Occult Blood Negative Negative    Micro Urine Req Microscopic    URINE MICROSCOPIC (W/UA)    Collection Time: 08/21/18  2:00 AM   Result Value Ref Range    WBC 0-2 (A) /hpf    RBC 0-2 (A) /hpf    Bacteria Negative None /hpf    Epithelial Cells Negative /hpf    Hyaline Cast 0-2 /lpf       Imaging/Procedures Review:    Independant Imaging Review: Completed  CT-ABDOMEN-PELVIS WITH   Final Result         1.  Subcutaneous fat stranding and skin thickening of the abdominal wall, appearance suggests lymphedema or cellulitis.   2.  Mild retroperitoneal and mesenteric adenopathy, minimally more prominent compared to prior. Additional workup as clinically appropriate.   3.  Large midline abdominal hernia.   4.  Diverticulosis   5.  Hepatomegaly   6.  Splenomegaly   7.  Atherosclerosis      DX-CHEST-LIMITED (1 VIEW)    (Results Pending)            EKG:   EKG Independant Review: Completed  QTc:486, HR: 68, Normal Sinus Rhythm, APCs, borderline AV conduction delay    Records reviewed and summarized in current documentation :  Yes  [employer] teaching service handout given to patient:  No         Assessment/Plan     * (HFpEF) heart failure with preserved ejection fraction (HCC)- (present on admission)   Assessment & Plan    - b/l pitting pedal edema  - Echo done 07/25 EF 60%  -   - NYHA class II - III post CABG  - Present symptomatology may represent acute exacerbation  - Not on beta blockers, Aspirin  - Taking Crestor 10 mg PO QhS    Plan  - Recommend starting beta blocker  - CXR  - Pulse ox, supplemental oxygen PRN        COPD (chronic  obstructive pulmonary disease) (HCC)- (present on admission)   Assessment & Plan    - Present symptomatology may represent acute exacerbation of COPD  - Albuterol 2 puffs Q6H pRN  - Symbicort 2 puffs BID  - Duoneb Q6H PRN  - Pulse oximetry  - Respiratory care per protocol  - supplemental oxygen PRN  - CXR        GI bleed   Assessment & Plan    - Pt had episodes of bleeding requiring transfusion of 5 units of PRBCs on last admission  - Hb 7.2, MCV 70  - Pt not on antiplatelet medication, DVT prophylaxis held, pt on SCDs    Plan  - Type and crossmatch for potential transfusion  - Transfuse if Hb < 7.0        Cellulitis of abdominal wall   Assessment & Plan    - Erythematous induration over anterior abdominal wall, non tender  - Ddx lymphedema per CT  - No emphysematous pockets per CT scan  - Vancomycin IV per protocol  - To monitor for signs of sepsis  - blood culture x 2 if febrile            Anticipated Hospital stay:  >2 midnights        Quality Measures  Quality-Core Measures   Reviewed items::  EKG reviewed, Labs reviewed, Medications reviewed and Radiology images reviewed  Ceballos catheter::  No Ceballos  DVT prophylaxis pharmacological::  Contraindicated - Anemia requiring blood transfusion and Contraindicated - High bleeding risk  DVT prophylaxis - mechanical:  SCDs  Ulcer Prophylaxis::  Yes    PCP: LISSA Liriano M.D.    The patient was seen by me face to face. I personally had a discussion with the patient. The case was discussed with our resident team, I examined the patient and confirmed the essential components of the history, physical examination, diagnosis and treatment plan as needed. I agree with the patient care as documented by the resident and edited as above by me. See resident's note above for complete details of service. The overall treatment regimen will be carried out as described above.     Thank you:  Jono Velasquez MD, FACP  [facility]  Pager: Use Tiger Text  (use resident info under treatment team for day to day floor issues)  Office: [phone] Ext. [phone]  Fax: [phone] (non PHI only)

## 2018-08-21 NOTE — ED PROVIDER NOTES
ED Provider Note    Scribed for Johnathan Quiroga M.D. by Marie Noble. 8/20/2018, 11:58 PM.    Primary care provider: Nam Le M.D.  Means of arrival: Ambulance  History obtained from: Patient  History limited by: None     CHIEF COMPLAINT  Chief Complaint   Patient presents with   • Abdominal Swelling     x3 days    • Constipation     x 3 days    • Other     CABG x3 in december       HPI  Joshua Medrano is a 52 y.o. male with a history of CHF, triple CABG and recent history of GI bleed who presents to the Emergency Department with abdominal distension onset 3 days ago. He reports experiencing associated constipation and shortness of breath since onset. Patient states he has not been able to lay down at night secondary to symptom exacerbation. He notes alleviation of shortness of breath when sitting upright. Patient adds leg swelling is unchanged from baseline. He denies associated bloody stool, nausea, or vomiting. Patient denies history of cirrhosis or hepatitis. He is currently not on any blood thinning medications. Patient has been compliant with his Lasix.     REVIEW OF SYSTEMS  Pertinent positives include abdominal distension, constipation, shortness of breath.   Pertinent negatives include hematochezia, nausea, emesis.   All other systems negative. C.     PAST MEDICAL HISTORY   has a past medical history of ASTHMA; Atrial fibrillation (HCC); CAD (coronary artery disease); Chronic obstructive pulmonary disease (HCC); and Diabetes.    SURGICAL HISTORY   has a past surgical history that includes other abdominal surgery; multiple coronary artery bypass endo vein harvest (12/22/2017); oscar (12/22/2017); thoracoscopy (Left, 1/25/2018); and colonoscopy - endo (N/A, 7/25/2018).    SOCIAL HISTORY  Social History   Substance Use Topics   • Smoking status: Former Smoker     Packs/day: 0.50     Years: 30.00     Types: Cigarettes     Quit date: 12/13/2017   • Smokeless tobacco: Never Used      Comment: 1/2-1.5  "packs for 30 years   • Alcohol use No      History   Drug Use No       FAMILY HISTORY  Family History   Problem Relation Age of Onset   • Diabetes Mother    • Stroke Mother    • Leukemia Mother    • Diabetes Father    • No Known Problems Sister    • Heart Disease Brother         PPM   • Lung Disease Brother    • Alcohol/Drug Brother    • Diabetes Sister        CURRENT MEDICATIONS    Current Facility-Administered Medications:   •  cefTRIAXone  •  vancomycin    Current Outpatient Prescriptions:   •  ipratropium-albuterol, 3 mL, Nebulization, Q6HRS PRN, 7/24/2018 at 1200  •  budesonide-formoterol, 2 Puff, Inhalation, BID, 7/24/2018 at 1000  •  amiodarone, 200 mg, Oral, DAILY, 7/24/2018 at 0900  •  rosuvastatin, 40 mg, Oral, Q EVENING, 7/23/2018 at 1930  •  albuterol, 2 Puff, Inhalation, Q6HRS PRN, 7/24/2018 at 1200     ALLERGIES  Allergies   Allergen Reactions   • Erythromycin Anaphylaxis     Rxn - 1994   • Cillins [Penicillins] Anaphylaxis and Rash     As a child, tolerated cefepime (12/2017), ceftriaxone (1/2018), cefazolin (12/2017)   • Metoprolol      Pt stated got l[place] and aggressive    • Shellfish Allergy Anaphylaxis     Pt stated that he is allergic to all seafood, will develop a rash and says that rash will clear up with benadryl       PHYSICAL EXAM  VITAL SIGNS: /71   Pulse 73   Temp 36.8 °C (98.3 °F)   Resp 18   Ht 1.956 m (6' 5\")   Wt (!) 151.9 kg (334 lb 14.1 oz)   SpO2 94%   BMI 39.71 kg/m²     Constitutional: Well developed, Well nourished, Mild distress.   HENT: Normocephalic, Atraumatic.   Eyes: Conjunctiva normal, No discharge.   Cardiovascular: Normal heart rate, Normal rhythm, No murmurs, equal pulses.   Pulmonary: Rales, Diminished breath sounds bilaterally.   Abdomen: Obese, Slightly distended.  Patient has erythema and warmth as well as some induration approximately 14 cm x 10 cm periumbilical  Back: No CVA tenderness.   Musculoskeletal: No major deformities noted, No tenderness. "   Skin: 1+ edema to lower extremities, Warm, Dry, No rash.  Erythema of the abdomen as described above  Neurologic: Alert & oriented x 3, Normal motor function,  No focal deficits noted.   Psychiatric: Affect normal, Judgment normal, Mood normal.    LABS  Results for orders placed or performed during the hospital encounter of 08/20/18   CBC WITH DIFFERENTIAL   Result Value Ref Range    WBC 16.8 (H) 4.8 - 10.8 K/uL    RBC 3.81 (L) 4.70 - 6.10 M/uL    Hemoglobin 7.2 (L) 14.0 - 18.0 g/dL    Hematocrit 26.9 (L) 42.0 - 52.0 %    MCV 70.6 (L) 81.4 - 97.8 fL    MCH 18.9 (L) 27.0 - 33.0 pg    MCHC 26.8 (L) 33.7 - 35.3 g/dL    RDW 65.9 (H) 35.9 - 50.0 fL    Platelet Count 245 164 - 446 K/uL    MPV 10.1 9.0 - 12.9 fL    Neutrophils-Polys 76.40 (H) 44.00 - 72.00 %    Lymphocytes 7.30 (L) 22.00 - 41.00 %    Monocytes 6.10 0.00 - 13.40 %    Eosinophils 4.40 0.00 - 6.90 %    Basophils 0.70 0.00 - 1.80 %    Immature Granulocytes 5.10 (H) 0.00 - 0.90 %    Nucleated RBC 0.60 /100 WBC    Lymphs (Absolute) 1.22 1.00 - 4.80 K/uL    Monos (Absolute) 1.03 (H) 0.00 - 0.85 K/uL    Eos (Absolute) 0.74 (H) 0.00 - 0.51 K/uL    Baso (Absolute) 0.11 0.00 - 0.12 K/uL    Immature Granulocytes (abs) 0.85 (H) 0.00 - 0.11 K/uL    NRBC (Absolute) 0.10 K/uL    Neutrophils (Absolute) 12.86 (H) 1.82 - 7.42 K/uL    Hypochromia 2+     Anisocytosis 3+     Microcytosis 2+    COMP METABOLIC PANEL   Result Value Ref Range    Sodium 136 135 - 145 mmol/L    Potassium 3.8 3.6 - 5.5 mmol/L    Chloride 105 96 - 112 mmol/L    Co2 23 20 - 33 mmol/L    Anion Gap 8.0 0.0 - 11.9    Glucose 135 (H) 65 - 99 mg/dL    Bun 19 8 - 22 mg/dL    Creatinine 1.25 0.50 - 1.40 mg/dL    Calcium 8.5 8.5 - 10.5 mg/dL    AST(SGOT) 14 12 - 45 U/L    ALT(SGPT) 12 2 - 50 U/L    Alkaline Phosphatase 103 (H) 30 - 99 U/L    Total Bilirubin 0.5 0.1 - 1.5 mg/dL    Albumin 3.6 3.2 - 4.9 g/dL    Total Protein 7.2 6.0 - 8.2 g/dL    Globulin 3.6 (H) 1.9 - 3.5 g/dL    A-G Ratio 1.0 g/dL   LIPASE    Result Value Ref Range    Lipase 26 11 - 82 U/L   URINALYSIS,CULTURE IF INDICATED   Result Value Ref Range    Color Yellow     Character Clear     Specific Gravity 1.012 <1.035    Ph 5.5 5.0 - 8.0    Glucose Negative Negative mg/dL    Ketones Negative Negative mg/dL    Protein 30 (A) Negative mg/dL    Bilirubin Negative Negative    Urobilinogen, Urine 1.0 Negative    Nitrite Negative Negative    Leukocyte Esterase Negative Negative    Occult Blood Negative Negative    Micro Urine Req Microscopic    BNP   Result Value Ref Range    B Natriuretic Peptide 595 (H) 0 - 100 pg/mL   ESTIMATED GFR   Result Value Ref Range    GFR If African American >60 >60 mL/min/1.73 m 2    GFR If Non African American >60 >60 mL/min/1.73 m 2   PERIPHERAL SMEAR REVIEW   Result Value Ref Range    Peripheral Smear Review see below    PLATELET ESTIMATE   Result Value Ref Range    Plt Estimation Normal    MORPHOLOGY   Result Value Ref Range    RBC Morphology Present     Large Platelets 1+     Polychromia 1+     Poikilocytosis 2+     Ovalocytes 2+     Schistocytes 1+    DIFFERENTIAL COMMENT   Result Value Ref Range    Comments-Diff see below    URINE MICROSCOPIC (W/UA)   Result Value Ref Range    WBC 0-2 (A) /hpf    RBC 0-2 (A) /hpf    Bacteria Negative None /hpf    Epithelial Cells Negative /hpf    Hyaline Cast 0-2 /lpf   EKG (NOW)   Result Value Ref Range    Report       [facility].    Test Date:  2018  Pt Name:    JARRETT WHITE                   Department: ER  MRN:        [id number]                      Room:  Gender:     Male                         Technician: [id number]  :        1965                   Requested By:ER TRIAGE PROTOCOL  Order #:    [id number]                    Reading MD:    Measurements  Intervals                                Axis  Rate:       68                           P:          124  MN:         208                          QRS:        -46  QRSD:       104                           T:          82  QT:         456  QTc:        486    Interpretive Statements  SINUS RHYTHM  ATRIAL PREMATURE COMPLEX  BORDERLINE AV CONDUCTION DELAY  LAD, CONSIDER LEFT ANTERIOR FASCICULAR BLOCK  LATE PRECORDIAL R/S TRANSITION  BORDERLINE PROLONGED QT INTERVAL  Compared to ECG 07/24/2018 23:09:57  Atrial premature complex(es) now present  T-wave abnormality no longer present     All labs reviewed by me.    EKG  12 Lead EKG interpreted by me shows a normal sinus rhythm at a rate of 68. Axis normal. No ST elevations. No T wave inversions. Occasional PAC's. Left anterior fascicular block. Leftward axis. Old EKG from 07/24/18 shows no significant changes. Final impression: Sinus rhythm with occasional PAC's.    RADIOLOGY  CT-ABDOMEN-PELVIS WITH   Final Result         1.  Subcutaneous fat stranding and skin thickening of the abdominal wall, appearance suggests lymphedema or cellulitis.   2.  Mild retroperitoneal and mesenteric adenopathy, minimally more prominent compared to prior. Additional workup as clinically appropriate.   3.  Large midline abdominal hernia.   4.  Diverticulosis   5.  Hepatomegaly   6.  Splenomegaly   7.  Atherosclerosis      The radiologist's interpretation of all radiological studies have been reviewed by me.    COURSE & MEDICAL DECISION MAKING  Pertinent Labs & Imaging studies reviewed. (See chart for details)    11:58 PM Patient seen and examined at bedside. Patient will be treated with 20 mg Lasix and 2.5mg albuterol. Ordered CT abdomen pelvis, EKG, Estimated GFR, BNP, CBC, CMP, Lipase, and other labs to evaluate his symptoms. The differential diagnoses include but are not limited to: CHF, Constipation, Bowel obstruction, Cirrhosis, Ascites.      2:30 AM patient reevaluated at bedside. He is doing well after treatment with Lasix and albuterol.  Reexamined the patient's abdomen patient does have induration and erythema at this point time I think this is more likely cellulitis then edema.   Discussed diagnostic results with patient as above. Patient agrees to further plan of care and hospital admission..     Page [facility] internal medicine.    Medical Decision Making: At this point time I think patient most likely has CHF and cellulitis of the abdominal wall.  I do not find any marked ascites.  At this point time will start patient on Vanco and Rocephin to cover his cellulitis and give him Lasix to help with volume overload.  DISPOSITION:  Patient will be admitted to  [facility] internal medicine in guarded condition.      FINAL IMPRESSION  1. Abdominal wall cellulitis    2. Congestive heart failure, unspecified HF chronicity, unspecified heart failure type (HCC)         Marie LARA (Fernando), am scribing for, and in the presence of, Johnathan Quiroga M.D.  Electronically signed by: Marie Noble (Fernando), 8/20/2018  Johnathan LARA M.D. personally performed the services described in this documentation, as scribed by Marie Noble in my presence, and it is both accurate and complete.    The note accurately reflects work and decisions made by me.  Johnathan Quiroga  8/21/2018  2:50 AM

## 2018-08-21 NOTE — ED NOTES
Patient talking with hospitalists. Patient A&O x4 and has no complaints at this time. 400 mL of clear urine out. NAD. VSS.

## 2018-08-21 NOTE — ASSESSMENT & PLAN NOTE
- Present symptomatology may represent acute exacerbation of COPD  - CXR negative except for cardiomegaly  - Albuterol 2 puffs Q6H pRN  - Symbicort 2 puffs BID  - Duoneb Q6H PRN  - Pulse oximetry  - Respiratory care per protocol  - supplemental oxygen PRN

## 2018-08-21 NOTE — ASSESSMENT & PLAN NOTE
- Unclear if preserved ejection fraction or not  - Echo done 07/25 EF 60% while on pressors, previously 27%  -   - CHF meds were stopped after last hospitalization  - Restarted lisinopril, spironolactone, metoprolol per Cardiology recommendations  - Taking Crestor 10 mg

## 2018-08-21 NOTE — PROGRESS NOTES
Internal Medicine Interval Note  Note Author: Lucio Roque M.D.     Name Joshua Medrano     1965   Age/Sex 52 y.o. male   MRN [id number]   Code Status Full     After 5PM or if no immediate response to page, please call for cross-coverage  Attending/Team: Dr. Velasquez/Camilo See Patient List for primary contact information  Call [phone] to page    1st Call - Day Intern (R1):   Dr. Roque 2nd Call - Day Sr. Resident (R2/R3):   Dr. Arenas         Reason for interval visit  (Principal Problem)     Abdominal swelling and shortness of breath      Interval Problem Daily Status Update  (24 hours, problem oriented, brief subjective history, new lab/imaging data pertinent to that problem)     Patient was admitted by the night team due to 3 days of shortness of breath which was attributed to the smoke in the air and an exacerbation of CHF.  He has also had increased swelling.  CT of abdomen was suggestive of either lymphedema or cellulitis.  Patient's hemoglobin was 7.2. Recent history of GI bleed with negative EGD and colonoscopy 2 weeks ago after which he received 5 U PRBC. Has had hemoglobin between 7-9 over the past 8 months, has also had chronically elevated WBC's, up to the 30's 8 months ago, and it was 16.8 on this presentation. Denies fatigue, lightheadedness, pallor but has SOB at rest that worsens with exertion, but he feels this is COPD related to the smoke.     This morning he reports feeling better and has no complaints other than not wanting to have multiple blood draws. His wife wanted him to come in for the abdominal swelling, but he reports no pain or tenderness, and does not see skin changes suggestive of a rash.     He admits to 3 days of constipation that ended with a BM yesterday, denies melena or joel blood, insists the stool was normal.      Review of Systems   Constitutional: Negative for chills, fever and weight loss.   HENT: Negative for congestion, nosebleeds and sinus  "pain.    Eyes: Negative for blurred vision and double vision.   Respiratory: Positive for cough, shortness of breath and wheezing. Negative for hemoptysis and sputum production.    Cardiovascular: Negative for chest pain, palpitations and leg swelling.   Gastrointestinal: Positive for constipation. Negative for abdominal pain, blood in stool, diarrhea, heartburn, melena, nausea and vomiting.   Genitourinary: Negative for dysuria, flank pain, frequency, hematuria and urgency.   Skin: Negative for itching and rash.   Neurological: Negative for dizziness, weakness and headaches.   Endo/Heme/Allergies: Does not bruise/bleed easily.       Disposition/Barriers to discharge:   Cardiac evaluation    Consultants/Specialty  Cardiology    PCP: Nam Le M.D.      Quality Measures  Quality-Core Measures   Reviewed items::  Labs reviewed, Medications reviewed and Radiology images reviewed  Ceballos catheter::  No Ceballos          Physical Exam       Vitals:    08/21/18 0500 08/21/18 0530 08/21/18 0735 08/21/18 0744   BP:  119/63 114/62    Pulse: 66 63 67 74   Resp: 19 20 18 18   Temp:  37 °C (98.6 °F) 36.5 °C (97.7 °F)    SpO2: 97% 98% 96% 95%   Weight:  (!) 148.9 kg (328 lb 4.2 oz)     Height:  1.956 m (6' 5\")       Body mass index is 38.93 kg/m². Weight: (!) 148.9 kg (328 lb 4.2 oz)  Oxygen Therapy:  Pulse Oximetry: 95 %, O2 (LPM): 0, FiO2%: 21 %, O2 Delivery: Nasal Cannula    Physical Exam   Constitutional: He is well-developed, well-nourished, and in no distress. No distress.   HENT:   Head: Normocephalic and atraumatic.   Eyes: Pupils are equal, round, and reactive to light. EOM are normal.   Neck: Normal range of motion. Neck supple. No JVD present. No tracheal deviation present.   Cardiovascular: Normal rate, regular rhythm and normal heart sounds.    Pulmonary/Chest: Effort normal. He has wheezes. He has no rales.   Abdominal: Soft. Bowel sounds are normal. He exhibits no mass. There is no tenderness. There is no rebound " and no guarding.   Obese abdomen, distant but present bowel sounds. Non-tender, slightly erythematous patch on lower abdomen, not warm to touch, area has been demarcated with pen. Does not appear edematous or distended.    Lymphadenopathy:     He has no cervical adenopathy.   Skin: He is not diaphoretic.   Extensive venous stasis dermatitis on LE. 1+ pitting edema             Assessment/Plan     * Congestive heart failure (HCC)- (present on admission)   Assessment & Plan    - Unclear if preserved ejection fraction or not  - Echo done 07/25 EF 60% while on pressors, previously 27%  -   - CHF meds were stopped after last hospitalization  - Taking Crestor 10 mg    Plan:  - Cardiology consult  - Repeat echo  - Likely restart CHF medications          COPD (chronic obstructive pulmonary disease) (HCC)- (present on admission)   Assessment & Plan    - Present symptomatology may represent acute exacerbation of COPD  - CXR negative except for cardiomegaly  - Albuterol 2 puffs Q6H pRN  - Symbicort 2 puffs BID  - Duoneb Q6H PRN  - Pulse oximetry  - Respiratory care per protocol  - supplemental oxygen PRN        GI bleed   Assessment & Plan    - Pt had episodes of bleeding requiring transfusion of 5 units of PRBCs on last admission, EGD and Colonoscopy were negative  - Hb 7.2, MCV 70  - Pt not on antiplatelet medication, DVT prophylaxis held, pt on SCDs    Plan  - Type and crossmatch for potential transfusion  -Continue to monitor CBC with morning labs  - Transfuse if Hb < 7.0        Cellulitis of abdominal wall   Assessment & Plan    -Treating for cellulitis, possible lymphedema per CT abdomen  -Patient reports improvement in swelling, denies tenderness, only mild erythema present  - No emphysematous pockets per CT scan  - Vancomycin IV per protocol  - To monitor for signs of sepsis            Lucio Roque M.D.    The patient was seen by me face to face. I personally had a discussion with the patient. The case was  discussed with our resident team, I examined the patient and confirmed the essential components of the history, physical examination, diagnosis and treatment plan as needed. I agree with the patient care as documented by the resident and edited as above by me. See resident's note above for complete details of service. The overall treatment regimen will be carried out as described above.     Thank you:  Jono Velasquez MD, FACP  [facility]  Pager: Use Tiger Text (use resident info under treatment team for day to day floor issues)  Office: [phone] Ext. [phone]  Fax: [phone] (non PHI only)

## 2018-08-21 NOTE — PROGRESS NOTES
"Pharmacy Kinetics 52 y.o. male on vancomycin day # 1 2018    Currently on Vancomycin 3000 mg iv LD followed by Vancomycin 2000 mg q12h    Indication for Treatment: Cellulitis     Pertinent history per medical record: Admitted on 2018 for cellulitis of abdominal wall.  Patient presents with abdominal distension x3 days with associated shortness of breath and constipation.        Allergies: Erythromycin; Cillins [penicillins]; Metoprolol; and Shellfish allergy     List concerns for renal function: BMI 39      Recent Labs      18   2200   WBC  16.8*   NEUTSPOLYS  76.40*     Recent Labs      18   2200   BUN  19   CREATININE  1.25   ALBUMIN  3.6     No results for input(s): VANCOTROUGH, VANCOPEAK, VANCORANDOM in the last 72 hours.  Intake/Output Summary (Last 24 hours) at 18 0506  Last data filed at 18 0333   Gross per 24 hour   Intake                0 ml   Output              800 ml   Net             -800 ml      Blood pressure 126/71, pulse 66, temperature 36.8 °C (98.3 °F), resp. rate (!) 21, height 1.956 m (6' 5\"), weight (!) 151.9 kg (334 lb 14.1 oz), SpO2 94 %. Temp (24hrs), Av.8 °C (98.3 °F), Min:36.8 °C (98.3 °F), Max:36.8 °C (98.3 °F)      A/P   1. Vancomycin dose change: New start  2. Next vancomycin level: Prior to 4th dose (not ordered)  3. Goal trough: 12-16 mcg/mL  4. Comments: Patient has some risk for accumulation given his BMI.  SCr elevated but close to baseline.  Will start a maintenance dose and recommend getting a level prior to the 4th dose.  Pharmacy will continue to follow.      Rafi Rodrigez, PharmD      "

## 2018-08-21 NOTE — ED TRIAGE NOTES
"Chief Complaint   Patient presents with   • Abdominal Swelling     x3 days    • Constipation     x 3 days    • Other     CABG x3 in december     Pt ambulatory to triage w/ minor difficulty, appears slightly SOB. Pt reporting SOB coinciding w/ onset of abdominal swelling. Pt appears pale, has morbid body habitus. Pt denies any hx of liver or kidney failure. Protocol ordered, blood drawn, Pt placed back in lobby at this time.    Blood pressure 126/71, pulse 73, temperature 36.8 °C (98.3 °F), resp. rate 18, height 1.956 m (6' 5\"), weight (!) 151.9 kg (334 lb 14.1 oz), SpO2 94 %.      "

## 2018-08-22 VITALS
BODY MASS INDEX: 37.19 KG/M2 | HEART RATE: 65 BPM | RESPIRATION RATE: 20 BRPM | WEIGHT: 315 LBS | SYSTOLIC BLOOD PRESSURE: 112 MMHG | DIASTOLIC BLOOD PRESSURE: 55 MMHG | HEIGHT: 77 IN | TEMPERATURE: 98.7 F | OXYGEN SATURATION: 98 %

## 2018-08-22 LAB
ANION GAP SERPL CALC-SCNC: 9 MMOL/L (ref 0–11.9)
BASOPHILS # BLD AUTO: 0.8 % (ref 0–1.8)
BASOPHILS # BLD: 0.14 K/UL (ref 0–0.12)
BUN SERPL-MCNC: 16 MG/DL (ref 8–22)
CALCIUM SERPL-MCNC: 9 MG/DL (ref 8.5–10.5)
CHLORIDE SERPL-SCNC: 103 MMOL/L (ref 96–112)
CO2 SERPL-SCNC: 25 MMOL/L (ref 20–33)
CREAT SERPL-MCNC: 1.19 MG/DL (ref 0.5–1.4)
EOSINOPHIL # BLD AUTO: 0.96 K/UL (ref 0–0.51)
EOSINOPHIL NFR BLD: 5.5 % (ref 0–6.9)
ERYTHROCYTE [DISTWIDTH] IN BLOOD BY AUTOMATED COUNT: 64.8 FL (ref 35.9–50)
GLUCOSE SERPL-MCNC: 103 MG/DL (ref 65–99)
HCT VFR BLD AUTO: 27.4 % (ref 42–52)
HGB BLD-MCNC: 7.4 G/DL (ref 14–18)
IMM GRANULOCYTES # BLD AUTO: 0.89 K/UL (ref 0–0.11)
IMM GRANULOCYTES NFR BLD AUTO: 5.1 % (ref 0–0.9)
IRON SATN MFR SERPL: ABNORMAL % (ref 15–55)
IRON SERPL-MCNC: <10 UG/DL (ref 50–180)
LYMPHOCYTES # BLD AUTO: 1.13 K/UL (ref 1–4.8)
LYMPHOCYTES NFR BLD: 6.5 % (ref 22–41)
MAGNESIUM SERPL-MCNC: 2.1 MG/DL (ref 1.5–2.5)
MCH RBC QN AUTO: 18.8 PG (ref 27–33)
MCHC RBC AUTO-ENTMCNC: 27 G/DL (ref 33.7–35.3)
MCV RBC AUTO: 69.5 FL (ref 81.4–97.8)
MONOCYTES # BLD AUTO: 1.07 K/UL (ref 0–0.85)
MONOCYTES NFR BLD AUTO: 6.1 % (ref 0–13.4)
NEUTROPHILS # BLD AUTO: 13.29 K/UL (ref 1.82–7.42)
NEUTROPHILS NFR BLD: 76 % (ref 44–72)
NRBC # BLD AUTO: 0.07 K/UL
NRBC BLD-RTO: 0.4 /100 WBC
PLATELET # BLD AUTO: 263 K/UL (ref 164–446)
PMV BLD AUTO: 10.1 FL (ref 9–12.9)
POTASSIUM SERPL-SCNC: 4 MMOL/L (ref 3.6–5.5)
RBC # BLD AUTO: 3.94 M/UL (ref 4.7–6.1)
SODIUM SERPL-SCNC: 137 MMOL/L (ref 135–145)
TIBC SERPL-MCNC: 356 UG/DL (ref 250–450)
VANCOMYCIN TROUGH SERPL-MCNC: 38.9 UG/ML (ref 10–20)
WBC # BLD AUTO: 17.5 K/UL (ref 4.8–10.8)

## 2018-08-22 PROCEDURE — 700102 HCHG RX REV CODE 250 W/ 637 OVERRIDE(OP): Performed by: STUDENT IN AN ORGANIZED HEALTH CARE EDUCATION/TRAINING PROGRAM

## 2018-08-22 PROCEDURE — 700105 HCHG RX REV CODE 258: Performed by: STUDENT IN AN ORGANIZED HEALTH CARE EDUCATION/TRAINING PROGRAM

## 2018-08-22 PROCEDURE — 80048 BASIC METABOLIC PNL TOTAL CA: CPT

## 2018-08-22 PROCEDURE — 80202 ASSAY OF VANCOMYCIN: CPT

## 2018-08-22 PROCEDURE — 94760 N-INVAS EAR/PLS OXIMETRY 1: CPT

## 2018-08-22 PROCEDURE — 85025 COMPLETE CBC W/AUTO DIFF WBC: CPT

## 2018-08-22 PROCEDURE — A9270 NON-COVERED ITEM OR SERVICE: HCPCS | Performed by: STUDENT IN AN ORGANIZED HEALTH CARE EDUCATION/TRAINING PROGRAM

## 2018-08-22 PROCEDURE — 700101 HCHG RX REV CODE 250: Performed by: HOSPITALIST

## 2018-08-22 PROCEDURE — 83540 ASSAY OF IRON: CPT

## 2018-08-22 PROCEDURE — 306589 SLEEVE,VASO CALF LARGE: Performed by: HOSPITALIST

## 2018-08-22 PROCEDURE — 700111 HCHG RX REV CODE 636 W/ 250 OVERRIDE (IP): Performed by: STUDENT IN AN ORGANIZED HEALTH CARE EDUCATION/TRAINING PROGRAM

## 2018-08-22 PROCEDURE — 36415 COLL VENOUS BLD VENIPUNCTURE: CPT

## 2018-08-22 PROCEDURE — 83735 ASSAY OF MAGNESIUM: CPT

## 2018-08-22 PROCEDURE — 99238 HOSP IP/OBS DSCHRG MGMT 30/<: CPT | Mod: GC | Performed by: HOSPITALIST

## 2018-08-22 PROCEDURE — 83550 IRON BINDING TEST: CPT

## 2018-08-22 PROCEDURE — 94640 AIRWAY INHALATION TREATMENT: CPT

## 2018-08-22 RX ORDER — LISINOPRIL 2.5 MG/1
2.5 TABLET ORAL DAILY
Qty: 30 TAB | Refills: 3 | Status: SHIPPED | OUTPATIENT
Start: 2018-08-23 | End: 2018-12-30 | Stop reason: SDUPTHER

## 2018-08-22 RX ORDER — LISINOPRIL 2.5 MG/1
2.5 TABLET ORAL
Status: DISCONTINUED | OUTPATIENT
Start: 2018-08-22 | End: 2018-08-22 | Stop reason: HOSPADM

## 2018-08-22 RX ORDER — ACETAMINOPHEN 325 MG/1
650 TABLET ORAL ONCE
Status: DISCONTINUED | OUTPATIENT
Start: 2018-08-22 | End: 2018-08-22 | Stop reason: HOSPADM

## 2018-08-22 RX ORDER — SPIRONOLACTONE 25 MG/1
25 TABLET ORAL DAILY
Qty: 30 TAB | Refills: 3 | Status: SHIPPED | OUTPATIENT
Start: 2018-08-23 | End: 2018-12-30 | Stop reason: SDUPTHER

## 2018-08-22 RX ORDER — METOPROLOL SUCCINATE 25 MG/1
25 TABLET, EXTENDED RELEASE ORAL DAILY
Qty: 30 TAB | Refills: 3 | Status: SHIPPED | OUTPATIENT
Start: 2018-08-23 | End: 2018-12-30 | Stop reason: SDUPTHER

## 2018-08-22 RX ORDER — METOPROLOL SUCCINATE 25 MG/1
25 TABLET, EXTENDED RELEASE ORAL
Status: DISCONTINUED | OUTPATIENT
Start: 2018-08-22 | End: 2018-08-22 | Stop reason: HOSPADM

## 2018-08-22 RX ORDER — DIPHENHYDRAMINE HYDROCHLORIDE 50 MG/ML
25 INJECTION INTRAMUSCULAR; INTRAVENOUS ONCE
Status: DISCONTINUED | OUTPATIENT
Start: 2018-08-22 | End: 2018-08-22 | Stop reason: HOSPADM

## 2018-08-22 RX ORDER — SPIRONOLACTONE 25 MG/1
25 TABLET ORAL
Status: DISCONTINUED | OUTPATIENT
Start: 2018-08-22 | End: 2018-08-22 | Stop reason: HOSPADM

## 2018-08-22 RX ORDER — DIPHENHYDRAMINE HCL 25 MG
25 TABLET ORAL ONCE
Status: DISCONTINUED | OUTPATIENT
Start: 2018-08-22 | End: 2018-08-22 | Stop reason: HOSPADM

## 2018-08-22 RX ADMIN — SPIRONOLACTONE 25 MG: 25 TABLET ORAL at 08:19

## 2018-08-22 RX ADMIN — IPRATROPIUM BROMIDE AND ALBUTEROL SULFATE 3 ML: .5; 3 SOLUTION RESPIRATORY (INHALATION) at 03:50

## 2018-08-22 RX ADMIN — FUROSEMIDE 40 MG: 40 TABLET ORAL at 04:36

## 2018-08-22 RX ADMIN — AMIODARONE HYDROCHLORIDE 200 MG: 200 TABLET ORAL at 04:36

## 2018-08-22 RX ADMIN — METOPROLOL SUCCINATE 25 MG: 25 TABLET, EXTENDED RELEASE ORAL at 08:19

## 2018-08-22 RX ADMIN — VANCOMYCIN HYDROCHLORIDE 2000 MG: 100 INJECTION, POWDER, LYOPHILIZED, FOR SOLUTION INTRAVENOUS at 04:36

## 2018-08-22 RX ADMIN — BUDESONIDE AND FORMOTEROL FUMARATE DIHYDRATE 2 PUFF: 80; 4.5 AEROSOL RESPIRATORY (INHALATION) at 07:57

## 2018-08-22 RX ADMIN — IPRATROPIUM BROMIDE AND ALBUTEROL SULFATE 3 ML: .5; 3 SOLUTION RESPIRATORY (INHALATION) at 11:26

## 2018-08-22 RX ADMIN — IPRATROPIUM BROMIDE AND ALBUTEROL SULFATE 3 ML: .5; 3 SOLUTION RESPIRATORY (INHALATION) at 07:57

## 2018-08-22 RX ADMIN — DOCUSATE SODIUM -SENNOSIDES 2 TABLET: 50; 8.6 TABLET, COATED ORAL at 04:36

## 2018-08-22 RX ADMIN — LISINOPRIL 2.5 MG: 2.5 TABLET ORAL at 06:52

## 2018-08-22 ASSESSMENT — ENCOUNTER SYMPTOMS
FLANK PAIN: 0
HEMOPTYSIS: 0
COUGH: 1
BLOOD IN STOOL: 0
WEAKNESS: 0
CONSTIPATION: 1
SPUTUM PRODUCTION: 0
WEIGHT LOSS: 0
HEADACHES: 0
SHORTNESS OF BREATH: 1
ABDOMINAL PAIN: 0
DOUBLE VISION: 0
PALPITATIONS: 0
CHILLS: 0
DIARRHEA: 0
DIZZINESS: 0
NAUSEA: 0
WHEEZING: 1
VOMITING: 0
FEVER: 0
HEARTBURN: 0
SINUS PAIN: 0
BLURRED VISION: 0
BRUISES/BLEEDS EASILY: 0

## 2018-08-22 ASSESSMENT — COGNITIVE AND FUNCTIONAL STATUS - GENERAL
DAILY ACTIVITIY SCORE: 24
SUGGESTED CMS G CODE MODIFIER MOBILITY: CH
SUGGESTED CMS G CODE MODIFIER DAILY ACTIVITY: CH
MOBILITY SCORE: 24

## 2018-08-22 ASSESSMENT — PATIENT HEALTH QUESTIONNAIRE - PHQ9
SUM OF ALL RESPONSES TO PHQ9 QUESTIONS 1 AND 2: 0
2. FEELING DOWN, DEPRESSED, IRRITABLE, OR HOPELESS: NOT AT ALL
1. LITTLE INTEREST OR PLEASURE IN DOING THINGS: NOT AT ALL
SUM OF ALL RESPONSES TO PHQ9 QUESTIONS 1 AND 2: 0
1. LITTLE INTEREST OR PLEASURE IN DOING THINGS: NOT AT ALL
2. FEELING DOWN, DEPRESSED, IRRITABLE, OR HOPELESS: NOT AT ALL

## 2018-08-22 ASSESSMENT — PAIN SCALES - GENERAL: PAINLEVEL_OUTOF10: 0

## 2018-08-22 NOTE — PROGRESS NOTES
Internal Medicine Interval Note  Note Author: Lucio Roque M.D.     Name Joshua Medrano     1965   Age/Sex 52 y.o. male   MRN [id number]   Code Status Full     After 5PM or if no immediate response to page, please call for cross-coverage  Attending/Team: Dr. Velasquez/Camilo See Patient List for primary contact information  Call [phone] to page    1st Call - Day Intern (R1):   Dr. Roque 2nd Call - Day Sr. Resident (R2/R3):   Dr. Arenas         Reason for interval visit  (Principal Problem)     Abdominal swelling and shortness of breath      Interval Problem Daily Status Update  (24 hours, problem oriented, brief subjective history, new lab/imaging data pertinent to that problem)     No overnight events.  This morning patient reports feeling fine, is eager to be discharged home.  He reports that the swelling in his lower abdomen has completely resolved, he no longer feels any.  He denies ever having tenderness or redness to the area nor underneath his panniculus.    Cardiology has evaluated the patient, and recommended against an inpatient echocardiogram at this point.  It was recommended that he restart on Spironolactone, lisinopril, and metoprolol.  There was a note of a history of allergy to metoprolol, this was discussed with the patient and he reports that his wife believes the medication made him angry, after discussion he understands that this is very unlikely an effect of the medication and is willing to resume taking it.       Review of Systems   Constitutional: Negative for chills, fever and weight loss.   HENT: Negative for congestion, nosebleeds and sinus pain.    Eyes: Negative for blurred vision and double vision.   Respiratory: Positive for cough, shortness of breath and wheezing. Negative for hemoptysis and sputum production.    Cardiovascular: Negative for chest pain, palpitations and leg swelling.   Gastrointestinal: Positive for constipation. Negative for abdominal  pain, blood in stool, diarrhea, heartburn, melena, nausea and vomiting.   Genitourinary: Negative for dysuria, flank pain, frequency, hematuria and urgency.   Skin: Negative for itching and rash.   Neurological: Negative for dizziness, weakness and headaches.   Endo/Heme/Allergies: Does not bruise/bleed easily.       Disposition/Barriers to discharge:   Cardiac evaluation    Consultants/Specialty  Cardiology    PCP: Nam Le M.D.      Quality Measures  Quality-Core Measures   Reviewed items::  Labs reviewed, Medications reviewed and Radiology images reviewed  Ceballos catheter::  No Ceballos          Physical Exam       Vitals:    08/22/18 0400 08/22/18 0722 08/22/18 0758 08/22/18 1127   BP: 113/58 112/55     Pulse: 65 65 95 65   Resp: 20 16 20 20   Temp: 36.5 °C (97.7 °F) 37.1 °C (98.7 °F)     SpO2: 93% 96% 98% 98%   Weight:       Height:         Body mass index is 39.4 kg/m². Weight: (!) 150.7 kg (332 lb 3.7 oz)  Oxygen Therapy:  Pulse Oximetry: 98 %, O2 (LPM): 0, O2 Delivery: None (Room Air)    Physical Exam   Constitutional: He is well-developed, well-nourished, and in no distress. No distress.   HENT:   Head: Normocephalic and atraumatic.   Eyes: Pupils are equal, round, and reactive to light. EOM are normal.   Neck: Normal range of motion. Neck supple. No JVD present. No tracheal deviation present.   Cardiovascular: Normal rate, regular rhythm and normal heart sounds.    Pulmonary/Chest: Effort normal. He has wheezes. He has no rales.   Abdominal: Soft. Bowel sounds are normal. He exhibits no mass. There is no tenderness. There is no rebound and no guarding.   Obese abdomen, distant but present bowel sounds. Non-tender, non-erythematous lower abdomen, not warm to touch, area has been demarcated with pen. Does not appear edematous or distended. Panniculus is not erythematous nor tender.    Lymphadenopathy:     He has no cervical adenopathy.   Skin: He is not diaphoretic.   Extensive venous stasis dermatitis on  LE. 1+ pitting edema             Assessment/Plan     * Congestive heart failure (HCC)- (present on admission)   Assessment & Plan    - Unclear if preserved ejection fraction or not  - Echo done 07/25 EF 60% while on pressors, previously 27%  -   - CHF meds were stopped after last hospitalization  - Restarted lisinopril, spironolactone, metoprolol per Cardiology recommendations  - Taking Crestor 10 mg          COPD (chronic obstructive pulmonary disease) (HCC)- (present on admission)   Assessment & Plan    - Present symptomatology may represent acute exacerbation of COPD  - CXR negative except for cardiomegaly  - Albuterol 2 puffs Q6H pRN  - Symbicort 2 puffs BID  - Duoneb Q6H PRN  - Pulse oximetry  - Respiratory care per protocol  - supplemental oxygen PRN        GI bleed   Assessment & Plan    Suspected  - Pt had episodes of bleeding requiring transfusion of 5 units of PRBCs on last admission, EGD and Colonoscopy were negative  - Hb 7.2, MCV 70  - Pt not on antiplatelet medication, DVT prophylaxis held, pt on SCDs  - FOBT negative          Cellulitis of abdominal wall   Assessment & Plan    Improved/Resolved  -Treating for cellulitis, possible lymphedema per CT abdomen  -Patient reports improvement in swelling, denies tenderness, only mild erythema present  - No emphysematous pockets per CT scan  - Vancomycin IV per protocol, discontinued on discharge            Lucio Roque M.D.    The patient was seen by me face to face. I personally had a discussion with the patient. The case was discussed with our resident team, I examined the patient and confirmed the essential components of the history, physical examination, diagnosis and treatment plan as needed. I agree with the patient care as documented by the resident and edited as above by me. See resident's note above for complete details of service. The overall treatment regimen will be carried out as described above.     Thank you:  Jono Velasquez MD,  FACP  [facility]  Pager: Use Tiger Text (use resident info under treatment team for day to day floor issues)  Office: [phone] Ext. [phone]  Fax: [phone] (non PHI only)

## 2018-08-22 NOTE — PROGRESS NOTES
IV Iron Per Pharmacy Note    Patient Lean Body Weight:  89 kg  Reason for Iron Replacement: Acute blood loss      Lab Results   Component Value Date/Time    WBC 17.5 (H) 08/22/2018 03:46 AM    RBC 3.94 (L) 08/22/2018 03:46 AM    HEMOGLOBIN 7.4 (L) 08/22/2018 03:46 AM    HEMATOCRIT 27.4 (L) 08/22/2018 03:46 AM    MCV 69.5 (L) 08/22/2018 03:46 AM    MCH 18.8 (L) 08/22/2018 03:46 AM    MCHC 27.0 (L) 08/22/2018 03:46 AM    MPV 10.1 08/22/2018 03:46 AM       Recent Labs      08/22/18   0346   IRON  <10*         Recent Labs      08/22/18   0346   CREATININE  1.19          Assessment/Plan:  1. IV Iron Indicated.   2. Give Iron Dextran 25 mg IV test dose following diphenhydramine/acetaminophen premeds over 30 minutes per protocol.  3. If no reaction (Anaphylaxis, Hypotension/Hypertension, N/V/D, Chest pain/Back Pain, Urticaria/Pruritis) in the next hour, proceed to full dose. Nursing to call the pharmacy IV room at ext. [phone] for full dose.  4. Full dose: Iron Dextran 2700 mg IV over 4 hours. Continue to monitor for delayed ADR including: Arthralgia/myalgia, Headache/backache, chills/dizziness/malaise, moderate to high fever and n/v.      Kinjal Gonzalez, PharmD.

## 2018-08-22 NOTE — PROGRESS NOTES
Patient AAOx4. No complaints of chest or abdominal pain at the moment. Patient stated that he feels short of breath at times. Scheduled and PRN inhalers given per MAR. Placed on  and monitored for any signs of respiratory distress. On O2 at 1 LPM per nasal cannula. Hourly rounding in place. Clustered nursing interventions to promote rest. Instructed to call for assistance whenever needed.

## 2018-08-22 NOTE — PROGRESS NOTES
Discharge to home with his daughter. Pt signed a copy of the discharge papers and confirms all questions have been answered by the MD or the RN. Second copy of d/c papers in chart. 3 Prescriptions sent to [facility] on [address] pt aware and stated he would go pick them up. IV discontinued. Pt states all person belongings are in possession. Pt escorted off unit with assistance from the CNA staff in a wheelchair without incident. Pt room has been stripped and is ready to be cleaned. No belongings left behind.

## 2018-08-22 NOTE — DISCHARGE INSTRUCTIONS
Discharge Instructions    Discharged to home by car with relative. Discharged via wheelchair, hospital escort: Yes.  Special equipment needed: Not Applicable    Be sure to schedule a follow-up appointment with your primary care doctor or any specialists as instructed.     Discharge Plan:   Diet Plan: Discussed  Activity Level: Discussed  Confirmed Follow up Appointment: Patient to Call and Schedule Appointment  Confirmed Symptoms Management: Discussed  Medication Reconciliation Updated: Yes  Pneumococcal Vaccine Administered/Refused: Not given - Patient refused pneumococcal vaccine  Influenza Vaccine Indication: Patient Refuses    I understand that a diet low in cholesterol, fat, and sodium is recommended for good health. Unless I have been given specific instructions below for another diet, I accept this instruction as my diet prescription.   Other diet:   Heart-Healthy Eating Plan  Introduction  Heart-healthy meal planning includes:  · Limiting unhealthy fats.  · Increasing healthy fats.  · Making other small dietary changes.  You may need to talk with your doctor or a diet specialist (dietitian) to create an eating plan that is right for you.  What types of fat should I choose?  · Choose healthy fats. These include olive oil and canola oil, flaxseeds, walnuts, almonds, and seeds.  · Eat more omega-3 fats. These include salmon, mackerel, sardines, tuna, flaxseed oil, and ground flaxseeds. Try to eat fish at least twice each week.  · Limit saturated fats.  ¨ Saturated fats are often found in animal products, such as meats, butter, and cream.  ¨ Plant sources of saturated fats include palm oil, palm kernel oil, and coconut oil.  · Avoid foods with partially hydrogenated oils in them. These include stick margarine, some tub margarines, cookies, crackers, and other baked goods. These contain trans fats.  What general guidelines do I need to follow?  · Check food labels carefully. Identify foods with trans fats or high  "amounts of saturated fat.  · Fill one half of your plate with vegetables and green salads. Eat 4-5 servings of vegetables per day. A serving of vegetables is:  ¨ 1 cup of raw leafy vegetables.  ¨ ½ cup of raw or cooked cut-up vegetables.  ¨ ½ cup of vegetable juice.  · Fill one fourth of your plate with whole grains. Look for the word \"whole\" as the first word in the ingredient list.  · Fill one fourth of your plate with lean protein foods.  · Eat 4-5 servings of fruit per day. A serving of fruit is:  ¨ One medium whole fruit.  ¨ ¼ cup of dried fruit.  ¨ ½ cup of fresh, frozen, or canned fruit.  ¨ ½ cup of 100% fruit juice.  · Eat more foods that contain soluble fiber. These include apples, broccoli, carrots, beans, peas, and barley. Try to get 20-30 g of fiber per day.  · Eat more home-cooked food. Eat less restaurant, buffet, and fast food.  · Limit or avoid alcohol.  · Limit foods high in starch and sugar.  · Avoid fried foods.  · Avoid frying your food. Try baking, boiling, grilling, or broiling it instead. You can also reduce fat by:  ¨ Removing the skin from poultry.  ¨ Removing all visible fats from meats.  ¨ Skimming the fat off of stews, soups, and gravies before serving them.  ¨ Steaming vegetables in water or broth.  · Lose weight if you are overweight.  · Eat 4-5 servings of nuts, legumes, and seeds per week:  ¨ One serving of dried beans or legumes equals ½ cup after being cooked.  ¨ One serving of nuts equals 1½ ounces.  ¨ One serving of seeds equals ½ ounce or one tablespoon.  · You may need to keep track of how much salt or sodium you eat. This is especially true if you have high blood pressure. Talk with your doctor or dietitian to get more information.  What foods can I eat?  Grains   Breads, including Mongolian, white, ludivina, wheat, raisin, rye, oatmeal, and Italian. Tortillas that are neither fried nor made with lard or trans fat. Low-fat rolls, including hotdog and hamburger buns and English " muffins. Biscuits. Muffins. Waffles. Pancakes. Light popcorn. Whole-grain cereals. Flatbread. Cata toast. Pretzels. Breadsticks. Rusks. Low-fat snacks. Low-fat crackers, including oyster, saltine, matzo, natalie, animal, and rye. Rice and pasta, including brown rice and pastas that are made with whole wheat.  Vegetables   All vegetables.  Fruits   All fruits, but limit coconut.  Meats and Other Protein Sources   Lean, well-trimmed beef, veal, pork, and lamb. Chicken and turkey without skin. All fish and shellfish. Wild duck, rabbit, pheasant, and venison. Egg whites or low-cholesterol egg substitutes. Dried beans, peas, lentils, and tofu. Seeds and most nuts.  Dairy   Low-fat or nonfat cheeses, including ricotta, string, and mozzarella. Skim or 1% milk that is liquid, powdered, or evaporated. Buttermilk that is made with low-fat milk. Nonfat or low-fat yogurt.  Beverages   Mineral water. Diet carbonated beverages.  Sweets and Desserts   Sherbets and fruit ices. Honey, jam, marmalade, jelly, and syrups. Meringues and gelatins. Pure sugar candy, such as hard candy, jelly beans, gumdrops, mints, marshmallows, and small amounts of dark chocolate. Oswald food cake.  Eat all sweets and desserts in moderation.  Fats and Oils   Nonhydrogenated (trans-free) margarines. Vegetable oils, including soybean, sesame, sunflower, olive, peanut, safflower, corn, canola, and cottonseed. Salad dressings or mayonnaise made with a vegetable oil. Limit added fats and oils that you use for cooking, baking, salads, and as spreads.  Other   Cocoa powder. Coffee and tea. All seasonings and condiments.  The items listed above may not be a complete list of recommended foods or beverages. Contact your dietitian for more options.   What foods are not recommended?  Grains   Breads that are made with saturated or trans fats, oils, or whole milk. Croissants. Butter rolls. Cheese breads. Sweet rolls. Donuts. Buttered popcorn. Chow mein noodles.  High-fat crackers, such as cheese or butter crackers.  Meats and Other Protein Sources   Fatty meats, such as hotdogs, short ribs, sausage, spareribs, leos, rib eye roast or steak, and mutton. High-fat deli meats, such as salami and bologna. Caviar. Domestic duck and goose. Organ meats, such as kidney, liver, sweetbreads, and heart.  Dairy   Cream, sour cream, cream cheese, and creamed cottage cheese. Whole-milk cheeses, including blue (juan), [place] Caleb, Brie, Leonid, American, Havarti, Swiss, cheddar, Camembert, and [place]. Whole or 2% milk that is liquid, evaporated, or condensed. Whole buttermilk. Cream sauce or high-fat cheese sauce. Yogurt that is made from whole milk.  Beverages   Regular sodas and juice drinks with added sugar.  Sweets and Desserts   Frosting. Pudding. Cookies. Cakes other than mallory food cake. Candy that has milk chocolate or white chocolate, hydrogenated fat, butter, coconut, or unknown ingredients. Buttered syrups. Full-fat ice cream or ice cream drinks.  Fats and Oils   Gravy that has suet, meat fat, or shortening. Cocoa butter, hydrogenated oils, palm oil, coconut oil, palm kernel oil. These can often be found in baked products, candy, fried foods, nondairy creamers, and whipped toppings. Solid fats and shortenings, including leos fat, salt pork, lard, and butter. Nondairy cream substitutes, such as coffee creamers and sour cream substitutes. Salad dressings that are made of unknown oils, cheese, or sour cream.  The items listed above may not be a complete list of foods and beverages to avoid. Contact your dietitian for more information.   This information is not intended to replace advice given to you by your health care provider. Make sure you discuss any questions you have with your health care provider.  Document Released: 06/18/2013 Document Revised: 05/25/2017 Document Reviewed: 06/11/2015  © 2017 Elsevier      Special Instructions: Discharge instructions for the Orthopedic  Patient    Follow up with Primary Care Physician within 2 weeks of discharge to home, regarding:  Review of medications and diagnostic testing.  Surveillance for medical complications.  Workup and treatment of osteoporosis, if appropriate.     -Is this a Joint Replacement patient? No    -Is this patient being discharged with medication to prevent blood clots?  No    · Is patient discharged on Warfarin / Coumadin?   No       Cellulitis, Adult  Introduction  Cellulitis is a skin infection. The infected area is usually red and sore. This condition occurs most often in the arms and lower legs. It is very important to get treated for this condition.  Follow these instructions at home:  · Take over-the-counter and prescription medicines only as told by your doctor.  · If you were prescribed an antibiotic medicine, take it as told by your doctor. Do not stop taking the antibiotic even if you start to feel better.  · Drink enough fluid to keep your pee (urine) clear or pale yellow.  · Do not touch or rub the infected area.  · Raise (elevate) the infected area above the level of your heart while you are sitting or lying down.  · Place warm or cold wet cloths (warm or cold compresses) on the infected area. Do this as told by your doctor.  · Keep all follow-up visits as told by your doctor. This is important. These visits let your doctor make sure your infection is not getting worse.  Contact a doctor if:  · You have a fever.  · Your symptoms do not get better after 1-2 days of treatment.  · Your bone or joint under the infected area starts to hurt after the skin has healed.  · Your infection comes back. This can happen in the same area or another area.  · You have a swollen bump in the infected area.  · You have new symptoms.  · You feel ill and also have muscle aches and pains.  Get help right away if:  · Your symptoms get worse.  · You feel very sleepy.  · You throw up (vomit) or have watery poop (diarrhea) for a long  time.  · There are red streaks coming from the infected area.  · Your red area gets larger.  · Your red area turns darker.  This information is not intended to replace advice given to you by your health care provider. Make sure you discuss any questions you have with your health care provider.  Document Released: 06/05/2009 Document Revised: 05/25/2017 Document Reviewed: 10/26/2016  © 2017 Jenny      Depression / Suicide Risk    As you are discharged from this [facility] facility, it is important to learn how to keep safe from harming yourself.    Recognize the warning signs:  · Abrupt changes in personality, positive or negative- including increase in energy   · Giving away possessions  · Change in eating patterns- significant weight changes-  positive or negative  · Change in sleeping patterns- unable to sleep or sleeping all the time   · Unwillingness or inability to communicate  · Depression  · Unusual sadness, discouragement and loneliness  · Talk of wanting to die  · Neglect of personal appearance   · Rebelliousness- reckless behavior  · Withdrawal from people/activities they love  · Confusion- inability to concentrate     If you or a loved one observes any of these behaviors or has concerns about self-harm, here's what you can do:  · Talk about it- your feelings and reasons for harming yourself  · Remove any means that you might use to hurt yourself (examples: pills, rope, extension cords, firearm)  · Get professional help from the community (Mental Health, Substance Abuse, psychological counseling)  · Do not be alone:Call your Safe Contact- someone whom you trust who will be there for you.  · Call your local CRISIS HOTLINE 590-4210 or 080-353-0112  · Call your local Children's Mobile Crisis Response Team [place] (070) 651-6202 or www.Weibu  · Call the toll free National Suicide Prevention Hotlines   · National Suicide Prevention Lifeline 730-692-JASO (7390)  · National Hope Line  Network 800-SUICIDE (114-6013)

## 2018-08-22 NOTE — CARE PLAN
Problem: Communication  Goal: The ability to communicate needs accurately and effectively will improve  Outcome: PROGRESSING AS EXPECTED      Problem: Fluid Volume:  Goal: Will maintain balanced intake and output  Outcome: PROGRESSING AS EXPECTED

## 2018-08-22 NOTE — CARE PLAN
rec'd report from MIN Garcia at 0655 and assumed care of this pt. Pt is awake/A&O x 4 and coming back from the BR. Pt denies pain/needs/complaints at this time. Safety measures in place, call light w/in reach.

## 2018-08-22 NOTE — PROGRESS NOTES
Acknowledged orders for Infed administration and spoke w/pt to inform him as well as to see if he has had this before and he expressed that he does not want to take it. Will let the MD know.

## 2018-08-22 NOTE — PROGRESS NOTES
Spoke w/Dr Roque and he spoke w/the pt. The pt reported that his wife felt he got angry w/the metoprolol in the past. Cariology has recommended this med and the pt reported to Dr Roque that he is ok taking this.

## 2018-08-22 NOTE — HEART FAILURE PROGRAM
Cardiovascular Nurse Navigator () Advanced Heart Failure Program Inpatient Progress Note:    Although abdominal cellulitis is patient's principal problem for this admission, per Dr. Casey, Mr. Medrano is also in borderline decompensated HF due to his a failure to reinitiate his original medical therapy after his prior admission for GI Bleed.    Therefore, pt must have an appointment scheduled within 7 days of discharge (PCP or Cards: call [phone] to arrange).     Thank you and please call with questions, Taylor    Your appointments     Aug 28, 2018  8:20 AM PDT  Care Manager Telephone Visit with CARE MANAGER  [facility] ([facility]) [address] [facility] [address]  [place] [id number] [phone]   IMPORTANT This is a phone visit only. Do not come into the office. The Care Manager will call you at the scheduled time for Care Manager Telephone Visit.   Aug 29, 2018  3:00 PM PDT  Established Patient with Zohreh James M.D.  [facility] [address]  [address]  [place] [id number] [phone]   You will be receiving a confirmation call a few days before your appointment from our automated call confirmation system.   Sep 04, 2018  9:00 AM PDT  Heart Failure New Patient with DON Nino  [facility] (--) [address]  [place] [address]  [phone]   Oct 16, 2018  1:30 PM PDT  Heart Failure Established Patient with Jett Bedoya M.D.  [facility] (--) [address]  [place] [address]  [phone]

## 2018-08-22 NOTE — DISCHARGE SUMMARY
Internal Medicine Discharge Summary  Note Author: Lucio Roque M.D.       Name Joshua Medrano    [facility] 1965   Age/Sex 52 y.o. male   MRN [id number]         Admit Date:  2018       Discharge Date:   2018    Service:   [facility] Internal Medicine Yellow Team  Attending Physician(s):   Dr. Velasquez       Senior Resident(s):   Dr. Arenas  Julius Resident(s):   Dr. Roque  PCP: Nam Le M.D.      Primary Diagnosis:   Cellulitis of lower abdomen    Secondary Diagnoses:                COPD exacerbation  Congestive heart failure  History of gastrointestinal bleed, suspected recurrence      Hospital Summary (Brief Narrative):         Mr. Medrano is a 52-year-old man with history of congestive heart failure, coronary artery disease status post CABG ×3, chronic obstructive pulmonary disease, and a recent GI bleed for which he was hospitalized and received 5 units of blood.  He presented to the emergency room with shortness of breath and swelling of the abdomen.  He was admitted for suspected cellulitis, and a COPD exacerbation likely due to the smoky air, as well as a possible CHF exacerbation.  He had an elevated white count of 16.8, and a low hemoglobin of 7.2.  Therefore, cellulitis and GI bleed were considered possibilities.  He was started on vancomycin and breathing treatments with respiratory protocol, as well as Lasix diuresis. On hospital day 1 he began to improve, and denied any tenderness of the lower abdomen, and reported that the swelling seemed to be going down.  On hospital day 2, the patient was eager for discharge, we had offered him a another night for observation which he declined, stating that he felt fine and more than strong enough to return home.       Patient /Hospital Summary (Details -- Problem Oriented) :          Cellulitis: As stated above    COPD, acute exacerbation: The patient was complaining of shortness of breath on presentation, reported that it was similar to  previous COPD exacerbations, attributed this flareup to the sm[place] air in the city.  He was given breathing treatments, and placed on respiratory protocol.  The shortness of breath subsided fairly quickly, and by hospital day 2 he denied any symptoms of COPD.    Congestive heart failure: The patient has a history of congestive heart failure, after his last hospitalization for GI bleed all of his CHF medications were stopped.  It was suspected that he may be having a concurrent exacerbation of CHF, and cardiology was consulted.  The recommendations were made to restart the patient on lisinopril, Spironolactone, metoprolol, and an echocardiogram as an inpatient was deemed to be unnecessary, and the patient shall follow-up as an outpatient.    Anemia/History of GI bleed: Patient had a recent history of gastrointestinal bleeding, was hospitalized 2 weeks prior to this admission during which she received 5 units packed red blood cells.  Colonoscopy and EGD revealed no source of the bleeding, and it apparently resolved on its own.  On admission the patient's hemoglobin was 7.2, and a recurrence of GI bleed was suspected.  However FOBT was negative.  The patient is chronically anemic, and was given IV iron repeat patient during his stay, and advised to follow-up on this as an outpatient.    Consultants:     Cardiology - Dr. Romaine Casey      Imaging/ Testing:      DX-CHEST-LIMITED (1 VIEW)   Final Result         1.  No acute cardiopulmonary disease.   2.  Cardiomegaly      CT-ABDOMEN-PELVIS WITH   Final Result         1.  Subcutaneous fat stranding and skin thickening of the abdominal wall, appearance suggests lymphedema or cellulitis.   2.  Mild retroperitoneal and mesenteric adenopathy, minimally more prominent compared to prior. Additional workup as clinically appropriate.   3.  Large midline abdominal hernia.   4.  Diverticulosis   5.  Hepatomegaly   6.  Splenomegaly   7.  Atherosclerosis            Discharge  Medications:         Medication Reconciliation: Completed       Medication List      START taking these medications      Instructions   lisinopril 2.5 MG Tabs  Commonly known as:  PRINIVIL   Take 1 Tab by mouth every day.  Dose:  2.5 mg     metoprolol SR 25 MG Tb24  Commonly known as:  TOPROL XL   Take 1 Tab by mouth every day.  Dose:  25 mg     spironolactone 25 MG Tabs  Commonly known as:  ALDACTONE   Take 1 Tab by mouth every day.  Dose:  25 mg        CONTINUE taking these medications      Instructions   albuterol 108 (90 Base) MCG/ACT Aers inhalation aerosol   Inhale 2 Puffs by mouth every 6 hours as needed for Shortness of Breath.  Dose:  2 Puff     amiodarone 200 MG Tabs  Commonly known as:  CORDARONE   Take 1 Tab by mouth every day.  Dose:  200 mg     budesonide-formoterol 80-4.5 MCG/ACT Aero  Commonly known as:  SYMBICORT   Inhale 2 Puffs by mouth 2 Times a Day.  Dose:  2 Puff     ipratropium-albuterol 0.5-2.5 (3) MG/3ML nebulizer solution  Commonly known as:  DUONEB   3 mL by Nebulization route every 6 hours as needed for Shortness of Breath.  Dose:  3 mL     LASIX PO   Take  by mouth.     rosuvastatin 40 MG tablet  Commonly known as:  CRESTOR   Take 1 Tab by mouth every evening.  Dose:  40 mg            Can use .DISCHARGEMEDSLIST if going to another facility         Disposition:   Discharged home    Diet:   Healthy    Activity:   As tolerated    Instructions:      Follow-up as an outpatient with primary care physician, and cardiology for reevaluation of congestive heart failure and echocardiogram.  The patient was instructed to return to the ER in the event of worsening symptoms. I have counseled the patient on the importance of compliance and the patient has agreed to proceed with all medical recommendations and follow up plan indicated above.   The patient understands that all medications come with benefits and risks. Risks may include permanent injury or death and these risks can be minimized with close  reassessment and monitoring.        Primary Care Provider:    Nam Le M.D.  Discharge summary faxed to primary care provider:  Deferred  Copy of discharge summary given to the patient: Deferred      Follow up appointment details :       Post-acute discharge clinic with Dr. James August 29  Heart failure clinic September 4    Pending Studies:        None    Time spent on discharge day patient visit, preparing discharge paperwork and arranging for patient follow up.    Summary of follow up issues:   None    Discharge Time (Minutes) :    More than 45 minutes  Hospital Course Type: Inpatient Stay < 2 midnights, patient recovered more rapidly than anticipated      Condition on Discharge    ______________________________________________________________________    Interval history/exam for day of discharge:       ______________________________________________________________________    Interval history/exam for day of discharge:    Patient feels well and is ready to be discharged.    Vitals:    08/22/18 0400 08/22/18 0722 08/22/18 0758 08/22/18 1127   BP: 113/58 112/55     Pulse: 65 65 95 65   Resp: 20 16 20 20   Temp: 36.5 °C (97.7 °F) 37.1 °C (98.7 °F)     SpO2: 93% 96% 98% 98%   Weight:       Height:         Weight/BMI: Body mass index is 39.4 kg/m².  Pulse Oximetry: 98 %, O2 (LPM): 0, O2 Delivery: None (Room Air)    General: No apparent distress  CVS: Regular rate and rhythm, no murmurs  PULM: Clear to auscultation bilaterally except for mild scattered wheezes      Most Recent Labs:    Lab Results   Component Value Date/Time    WBC 17.5 (H) 08/22/2018 03:46 AM    RBC 3.94 (L) 08/22/2018 03:46 AM    HEMOGLOBIN 7.4 (L) 08/22/2018 03:46 AM    HEMATOCRIT 27.4 (L) 08/22/2018 03:46 AM    MCV 69.5 (L) 08/22/2018 03:46 AM    MCH 18.8 (L) 08/22/2018 03:46 AM    MCHC 27.0 (L) 08/22/2018 03:46 AM    MPV 10.1 08/22/2018 03:46 AM    NEUTSPOLYS 76.00 (H) 08/22/2018 03:46 AM    LYMPHOCYTES 6.50 (L) 08/22/2018 03:46 AM     MONOCYTES 6.10 08/22/2018 03:46 AM    EOSINOPHILS 5.50 08/22/2018 03:46 AM    EOSINOPHILS 1 01/03/2018 03:45 PM    BASOPHILS 0.80 08/22/2018 03:46 AM    HYPOCHROMIA 2+ 08/20/2018 10:00 PM    ANISOCYTOSIS 3+ 08/20/2018 10:00 PM      Lab Results   Component Value Date/Time    SODIUM 137 08/22/2018 03:46 AM    POTASSIUM 4.0 08/22/2018 03:46 AM    CHLORIDE 103 08/22/2018 03:46 AM    CO2 25 08/22/2018 03:46 AM    GLUCOSE 103 (H) 08/22/2018 03:46 AM    BUN 16 08/22/2018 03:46 AM    CREATININE 1.19 08/22/2018 03:46 AM      Lab Results   Component Value Date/Time    ALTSGPT 12 08/20/2018 10:00 PM    ASTSGOT 14 08/20/2018 10:00 PM    ALKPHOSPHAT 103 (H) 08/20/2018 10:00 PM    TBILIRUBIN 0.5 08/20/2018 10:00 PM    LIPASE 26 08/20/2018 10:00 PM    ALBUMIN 3.6 08/20/2018 10:00 PM    GLOBULIN 3.6 (H) 08/20/2018 10:00 PM    PREALBUMIN 10.0 (L) 01/29/2018 03:58 AM    INR 1.35 (H) 07/25/2018 03:54 AM    MACROCYTOSIS 1+ 02/06/2018 03:39 PM     Lab Results   Component Value Date/Time    PROTHROMBTM 16.4 (H) 07/25/2018 03:54 AM    INR 1.35 (H) 07/25/2018 03:54 AM            Most Recent Labs:    Lab Results   Component Value Date/Time    WBC 17.5 (H) 08/22/2018 03:46 AM    RBC 3.94 (L) 08/22/2018 03:46 AM    HEMOGLOBIN 7.4 (L) 08/22/2018 03:46 AM    HEMATOCRIT 27.4 (L) 08/22/2018 03:46 AM    MCV 69.5 (L) 08/22/2018 03:46 AM    MCH 18.8 (L) 08/22/2018 03:46 AM    MCHC 27.0 (L) 08/22/2018 03:46 AM    MPV 10.1 08/22/2018 03:46 AM    NEUTSPOLYS 76.00 (H) 08/22/2018 03:46 AM    LYMPHOCYTES 6.50 (L) 08/22/2018 03:46 AM    MONOCYTES 6.10 08/22/2018 03:46 AM    EOSINOPHILS 5.50 08/22/2018 03:46 AM    EOSINOPHILS 1 01/03/2018 03:45 PM    BASOPHILS 0.80 08/22/2018 03:46 AM    HYPOCHROMIA 2+ 08/20/2018 10:00 PM    ANISOCYTOSIS 3+ 08/20/2018 10:00 PM      Lab Results   Component Value Date/Time    SODIUM 137 08/22/2018 03:46 AM    POTASSIUM 4.0 08/22/2018 03:46 AM    CHLORIDE 103 08/22/2018 03:46 AM    CO2 25 08/22/2018 03:46 AM    GLUCOSE 103 (H)  08/22/2018 03:46 AM    BUN 16 08/22/2018 03:46 AM    CREATININE 1.19 08/22/2018 03:46 AM      Lab Results   Component Value Date/Time    ALTSGPT 12 08/20/2018 10:00 PM    ASTSGOT 14 08/20/2018 10:00 PM    ALKPHOSPHAT 103 (H) 08/20/2018 10:00 PM    TBILIRUBIN 0.5 08/20/2018 10:00 PM    LIPASE 26 08/20/2018 10:00 PM    ALBUMIN 3.6 08/20/2018 10:00 PM    GLOBULIN 3.6 (H) 08/20/2018 10:00 PM    PREALBUMIN 10.0 (L) 01/29/2018 03:58 AM    INR 1.35 (H) 07/25/2018 03:54 AM    MACROCYTOSIS 1+ 02/06/2018 03:39 PM     Lab Results   Component Value Date/Time    PROTHROMBTM 16.4 (H) 07/25/2018 03:54 AM    INR 1.35 (H) 07/25/2018 03:54 AM      Lucio Roque M.D.    The patient was seen by me face to face. I personally had a discussion with the patient. The case was discussed with our resident team, I examined the patient and confirmed the essential components of the history, physical examination, diagnosis and treatment plan as needed. I agree with the patient care as documented by the resident and edited as above by me. See resident's note above for complete details of service. The overall treatment regimen will be carried out as described above.     Thank you:  Jono Velasquez MD, FACP  [facility]  Pager: Use Tiger Text (use resident info under treatment team for day to day floor issues)  Office: [phone] Ext. [phone]  Fax: [phone] (non PHI only)

## 2018-09-09 ENCOUNTER — APPOINTMENT (OUTPATIENT)
Dept: RADIOLOGY | Facility: MEDICAL CENTER | Age: 53
End: 2018-09-09
Attending: EMERGENCY MEDICINE
Payer: MEDICARE

## 2018-09-09 ENCOUNTER — APPOINTMENT (OUTPATIENT)
Dept: RADIOLOGY | Facility: MEDICAL CENTER | Age: 53
End: 2018-09-09
Payer: MEDICARE

## 2018-09-09 ENCOUNTER — HOSPITAL ENCOUNTER (EMERGENCY)
Facility: MEDICAL CENTER | Age: 53
End: 2018-09-09
Attending: EMERGENCY MEDICINE
Payer: MEDICARE

## 2018-09-09 VITALS
BODY MASS INDEX: 37.19 KG/M2 | TEMPERATURE: 97.5 F | HEIGHT: 77 IN | WEIGHT: 315 LBS | SYSTOLIC BLOOD PRESSURE: 113 MMHG | DIASTOLIC BLOOD PRESSURE: 62 MMHG | HEART RATE: 58 BPM | RESPIRATION RATE: 17 BRPM | OXYGEN SATURATION: 99 %

## 2018-09-09 DIAGNOSIS — E87.70 HYPERVOLEMIA, UNSPECIFIED HYPERVOLEMIA TYPE: ICD-10-CM

## 2018-09-09 DIAGNOSIS — I50.9 CHRONIC CONGESTIVE HEART FAILURE, UNSPECIFIED HEART FAILURE TYPE (HCC): ICD-10-CM

## 2018-09-09 DIAGNOSIS — D64.9 CHRONIC ANEMIA: ICD-10-CM

## 2018-09-09 LAB
ALBUMIN SERPL BCP-MCNC: 3.4 G/DL (ref 3.2–4.9)
ALBUMIN/GLOB SERPL: 0.9 G/DL
ALP SERPL-CCNC: 91 U/L (ref 30–99)
ALT SERPL-CCNC: 19 U/L (ref 2–50)
ANION GAP SERPL CALC-SCNC: 8 MMOL/L (ref 0–11.9)
ANISOCYTOSIS BLD QL SMEAR: ABNORMAL
APTT PPP: 32 SEC (ref 24.7–36)
AST SERPL-CCNC: 20 U/L (ref 12–45)
BASOPHILS # BLD AUTO: 0.8 % (ref 0–1.8)
BASOPHILS # BLD: 0.12 K/UL (ref 0–0.12)
BILIRUB SERPL-MCNC: 0.8 MG/DL (ref 0.1–1.5)
BNP SERPL-MCNC: 468 PG/ML (ref 0–100)
BUN SERPL-MCNC: 23 MG/DL (ref 8–22)
CALCIUM SERPL-MCNC: 9.2 MG/DL (ref 8.5–10.5)
CHLORIDE SERPL-SCNC: 103 MMOL/L (ref 96–112)
CO2 SERPL-SCNC: 25 MMOL/L (ref 20–33)
COMMENT 1642: NORMAL
CREAT SERPL-MCNC: 1.48 MG/DL (ref 0.5–1.4)
EKG IMPRESSION: NORMAL
EKG IMPRESSION: NORMAL
EOSINOPHIL # BLD AUTO: 0.57 K/UL (ref 0–0.51)
EOSINOPHIL NFR BLD: 3.7 % (ref 0–6.9)
ERYTHROCYTE [DISTWIDTH] IN BLOOD BY AUTOMATED COUNT: 63.8 FL (ref 35.9–50)
GLOBULIN SER CALC-MCNC: 3.8 G/DL (ref 1.9–3.5)
GLUCOSE SERPL-MCNC: 139 MG/DL (ref 65–99)
HCT VFR BLD AUTO: 26.5 % (ref 42–52)
HGB BLD-MCNC: 6.7 G/DL (ref 14–18)
HYPOCHROMIA BLD QL SMEAR: ABNORMAL
IMM GRANULOCYTES # BLD AUTO: 0.32 K/UL (ref 0–0.11)
IMM GRANULOCYTES NFR BLD AUTO: 2.1 % (ref 0–0.9)
INR PPP: 1.33 (ref 0.87–1.13)
LYMPHOCYTES # BLD AUTO: 1.06 K/UL (ref 1–4.8)
LYMPHOCYTES NFR BLD: 6.8 % (ref 22–41)
MCH RBC QN AUTO: 17.5 PG (ref 27–33)
MCHC RBC AUTO-ENTMCNC: 25.3 G/DL (ref 33.7–35.3)
MCV RBC AUTO: 69.2 FL (ref 81.4–97.8)
MICROCYTES BLD QL SMEAR: ABNORMAL
MONOCYTES # BLD AUTO: 0.93 K/UL (ref 0–0.85)
MONOCYTES NFR BLD AUTO: 6 % (ref 0–13.4)
MORPHOLOGY BLD-IMP: NORMAL
NEUTROPHILS # BLD AUTO: 12.6 K/UL (ref 1.82–7.42)
NEUTROPHILS NFR BLD: 80.6 % (ref 44–72)
NRBC # BLD AUTO: 0.1 K/UL
NRBC BLD-RTO: 0.6 /100 WBC
OVALOCYTES BLD QL SMEAR: NORMAL
PLATELET # BLD AUTO: 266 K/UL (ref 164–446)
PLATELET BLD QL SMEAR: NORMAL
POIKILOCYTOSIS BLD QL SMEAR: NORMAL
POLYCHROMASIA BLD QL SMEAR: NORMAL
POTASSIUM SERPL-SCNC: 4.3 MMOL/L (ref 3.6–5.5)
PROT SERPL-MCNC: 7.2 G/DL (ref 6–8.2)
PROTHROMBIN TIME: 16.2 SEC (ref 12–14.6)
RBC # BLD AUTO: 3.83 M/UL (ref 4.7–6.1)
RBC BLD AUTO: PRESENT
SODIUM SERPL-SCNC: 136 MMOL/L (ref 135–145)
TROPONIN I SERPL-MCNC: 0.02 NG/ML (ref 0–0.04)
WBC # BLD AUTO: 15.6 K/UL (ref 4.8–10.8)

## 2018-09-09 PROCEDURE — 83880 ASSAY OF NATRIURETIC PEPTIDE: CPT

## 2018-09-09 PROCEDURE — 99285 EMERGENCY DEPT VISIT HI MDM: CPT

## 2018-09-09 PROCEDURE — 71045 X-RAY EXAM CHEST 1 VIEW: CPT

## 2018-09-09 PROCEDURE — 80053 COMPREHEN METABOLIC PANEL: CPT

## 2018-09-09 PROCEDURE — 85025 COMPLETE CBC W/AUTO DIFF WBC: CPT

## 2018-09-09 PROCEDURE — 96374 THER/PROPH/DIAG INJ IV PUSH: CPT

## 2018-09-09 PROCEDURE — 85730 THROMBOPLASTIN TIME PARTIAL: CPT

## 2018-09-09 PROCEDURE — 94640 AIRWAY INHALATION TREATMENT: CPT

## 2018-09-09 PROCEDURE — 85610 PROTHROMBIN TIME: CPT

## 2018-09-09 PROCEDURE — 93005 ELECTROCARDIOGRAM TRACING: CPT | Performed by: EMERGENCY MEDICINE

## 2018-09-09 PROCEDURE — 700101 HCHG RX REV CODE 250: Performed by: EMERGENCY MEDICINE

## 2018-09-09 PROCEDURE — 700111 HCHG RX REV CODE 636 W/ 250 OVERRIDE (IP): Performed by: EMERGENCY MEDICINE

## 2018-09-09 PROCEDURE — 84484 ASSAY OF TROPONIN QUANT: CPT

## 2018-09-09 PROCEDURE — 93005 ELECTROCARDIOGRAM TRACING: CPT

## 2018-09-09 RX ORDER — FUROSEMIDE 10 MG/ML
40 INJECTION INTRAMUSCULAR; INTRAVENOUS ONCE
Status: COMPLETED | OUTPATIENT
Start: 2018-09-09 | End: 2018-09-09

## 2018-09-09 RX ORDER — IPRATROPIUM BROMIDE AND ALBUTEROL SULFATE 2.5; .5 MG/3ML; MG/3ML
3 SOLUTION RESPIRATORY (INHALATION) ONCE
Status: COMPLETED | OUTPATIENT
Start: 2018-09-09 | End: 2018-09-09

## 2018-09-09 RX ADMIN — FUROSEMIDE 40 MG: 10 INJECTION, SOLUTION INTRAMUSCULAR; INTRAVENOUS at 22:08

## 2018-09-09 RX ADMIN — IPRATROPIUM BROMIDE AND ALBUTEROL SULFATE 3 ML: .5; 3 SOLUTION RESPIRATORY (INHALATION) at 22:26

## 2018-09-09 ASSESSMENT — COPD QUESTIONNAIRES
COPD SCREENING SCORE: 5
HAVE YOU SMOKED AT LEAST 100 CIGARETTES IN YOUR ENTIRE LIFE: YES
DO YOU EVER COUGH UP ANY MUCUS OR PHLEGM?: NO/ONLY WITH OCCASIONAL COLDS OR INFECTIONS
DURING THE PAST 4 WEEKS HOW MUCH DID YOU FEEL SHORT OF BREATH: SOME OF THE TIME

## 2018-09-09 ASSESSMENT — LIFESTYLE VARIABLES: EVER_SMOKED: YES

## 2018-09-09 ASSESSMENT — PAIN SCALES - WONG BAKER: WONGBAKER_NUMERICALRESPONSE: DOESN'T HURT AT ALL

## 2018-09-09 ASSESSMENT — PAIN SCALES - GENERAL: PAINLEVEL_OUTOF10: 0

## 2018-09-10 ENCOUNTER — PATIENT OUTREACH (OUTPATIENT)
Dept: HEALTH INFORMATION MANAGEMENT | Facility: OTHER | Age: 53
End: 2018-09-10

## 2018-09-10 NOTE — PROGRESS NOTES
Placed discharge outreach phone call to pt s/p ER discharge 9/9/18.  Left voicemail providing my contact information and instructions to call with any questions or concerns.

## 2018-09-10 NOTE — ED TRIAGE NOTES
Chief Complaint   Patient presents with   • Abdominal Swelling     Patient states the left side of abdominal has been increasing in size for the past week.    • Shortness of Breath     Patient stated it has been increasing over the last week. Patient is on 2 liters home oxygen.        Patient ambulatory to triage room with steady gait on oxygen with above complaint.  Patient states he had triple bypass surgery in December. Patient denies chest pain at this time. A+Ox4. VSS. EKG ordered.     Patient taken to triage room for EKG and then placed back out in lobby. Educated on triage process.

## 2018-09-10 NOTE — DISCHARGE INSTRUCTIONS
Heart Failure  Heart failure is a condition in which the heart has trouble pumping blood because it has become weak or stiff. This means that the heart does not pump blood efficiently for the body to work well. For some people with heart failure, fluid may back up into the lungs and there may be swelling (edema) in the lower legs. Heart failure is usually a long-term (chronic) condition. It is important for you to take good care of yourself and follow the treatment plan from your health care provider.  What are the causes?  This condition is caused by some health problems, including:  · High blood pressure (hypertension). Hypertension causes the heart muscle to work harder than normal. High blood pressure eventually causes the heart to become stiff and weak.  · Coronary artery disease (CAD). CAD is the buildup of cholesterol and fat (plaques) in the arteries of the heart.  · Heart attack (myocardial infarction). Injured tissue, which is caused by the heart attack, does not contract as well and the heart's ability to pump blood is weakened.  · Abnormal heart valves. When the heart valves do not open and close properly, the heart muscle must pump harder to keep the blood flowing.  · Heart muscle disease (cardiomyopathy or myocarditis). Heart muscle disease is damage to the heart muscle from a variety of causes, such as drug or alcohol abuse, infections, or unknown causes. These can increase the risk of heart failure.  · Lung disease. When the lungs do not work properly, the heart must work harder.  What increases the risk?  Risk of heart failure increases as a person ages. This condition is also more likely to develop in people who:  · Are overweight.  · Are male.  · Smoke or chew tobacco.  · Abuse alcohol or illegal drugs.  · Have taken medicines that can damage the heart, such as chemotherapy drugs.  · Have diabetes.  ¨ High blood sugar (glucose) is associated with high fat (lipid) levels in the blood.  ¨ Diabetes  can also damage tiny blood vessels that carry nutrients to the heart muscle.  · Have abnormal heart rhythms.  · Have thyroid problems.  · Have low blood counts (anemia).  What are the signs or symptoms?  Symptoms of this condition include:  · Shortness of breath with activity, such as when climbing stairs.  · Persistent cough.  · Swelling of the feet, ankles, legs, or abdomen.  · Unexplained weight gain.  · Difficulty breathing when lying flat (orthopnea).  · Waking from sleep because of the need to sit up and get more air.  · Rapid heartbeat.  · Fatigue and loss of energy.  · Feeling light-headed, dizzy, or close to fainting.  · Loss of appetite.  · Nausea.  · Increased urination during the night (nocturia).  · Confusion.  How is this diagnosed?  This condition is diagnosed based on:  · Medical history, symptoms, and a physical exam.  · Diagnostic tests, which may include:  ¨ Echocardiogram.  ¨ Electrocardiogram (ECG).  ¨ Chest X-ray.  ¨ Blood tests.  ¨ Exercise stress test.  ¨ Radionuclide scans.  ¨ Cardiac catheterization and angiogram.  How is this treated?  Treatment for this condition is aimed at managing the symptoms of heart failure. Medicines, behavioral changes, or other treatments may be necessary to treat heart failure.  Medicines   These may include:  · Angiotensin-converting enzyme (ACE) inhibitors. This type of medicine blocks the effects of a blood protein called angiotensin-converting enzyme. ACE inhibitors relax (dilate) the blood vessels and help to lower blood pressure.  · Angiotensin receptor blockers (ARBs). This type of medicine blocks the actions of a blood protein called angiotensin. ARBs dilate the blood vessels and help to lower blood pressure.  · Water pills (diuretics). Diuretics cause the kidneys to remove salt and water from the blood. The extra fluid is removed through urination, leaving a lower volume of blood that the heart has to pump.  · Beta blockers. These improve heart muscle  strength and they prevent the heart from beating too quickly.  · Digoxin. This increases the force of the heartbeat.  Healthy behavior changes   These may include:  · Reaching and maintaining a healthy weight.  · Stopping smoking or chewing tobacco.  · Eating heart-healthy foods.  · Limiting or avoiding alcohol.  · Stopping use of street drugs (illegal drugs).  · Physical activity.  Other treatments   These may include:  · Surgery to open blocked coronary arteries or repair damaged heart valves.  · Placement of a biventricular pacemaker to improve heart muscle function (cardiac resynchronization therapy). This device paces both the right ventricle and left ventricle.  · Placement of a device to treat serious abnormal heart rhythms (implantable cardioverter defibrillator, or ICD).  · Placement of a device to improve the pumping ability of the heart (left ventricular assist device, or LVAD).  · Heart transplant. This can cure heart failure, and it is considered for certain patients who do not improve with other therapies.  Follow these instructions at home:  Medicines  · Take over-the-counter and prescription medicines only as told by your health care provider. Medicines are important in reducing the workload of your heart, slowing the progression of heart failure, and improving your symptoms.  ¨ Do not stop taking your medicine unless your health care provider told you to do that.  ¨ Do not skip any dose of medicine.  ¨ Refill your prescriptions before you run out of medicine. You need your medicines every day.  Eating and drinking  · Eat heart-healthy foods. Talk with a dietitian to make an eating plan that is right for you.  ¨ Choose foods that contain no trans fat and are low in saturated fat and cholesterol. Healthy choices include fresh or frozen fruits and vegetables, fish, lean meats, legumes, fat-free or low-fat dairy products, and whole-grain or high-fiber foods.  ¨ Limit salt (sodium) if directed by your  health care provider. Sodium restriction may reduce symptoms of heart failure. Ask a dietitian to recommend heart-healthy seasonings.  ¨ Use healthy cooking methods instead of frying. Healthy methods include roasting, grilling, broiling, baking, poaching, steaming, and stir-frying.  · Limit your fluid intake if directed by your health care provider. Fluid restriction may reduce symptoms of heart failure.  Lifestyle  · Stop smoking or using chewing tobacco. Nicotine and tobacco can damage your heart and your blood vessels. Do not use nicotine gum or patches before talking to your health care provider.  · Limit alcohol intake to no more than 1 drink per day for non-pregnant women and 2 drinks per day for men. One drink equals 12 oz of beer, 5 oz of wine, or 1½ oz of hard liquor.  ¨ Drinking more than that is harmful to your heart. Tell your health care provider if you drink alcohol several times a week.  ¨ Talk with your health care provider about whether any level of alcohol use is safe for you.  ¨ If your heart has already been damaged by alcohol or you have severe heart failure, drinking alcohol should be stopped completely.  · Stop use of illegal drugs.  · Lose weight if directed by your health care provider. Weight loss may reduce symptoms of heart failure.  · Do moderate physical activity if directed by your health care provider. People who are elderly and people with severe heart failure should consult with a health care provider for physical activity recommendations.  Monitor important information  · Weigh yourself every day. Keeping track of your weight daily helps you to notice excess fluid sooner.  ¨ Weigh yourself every morning after you urinate and before you eat breakfast.  ¨ Wear the same amount of clothing each time you weigh yourself.  ¨ Record your daily weight. Provide your health care provider with your weight record.  · Monitor and record your blood pressure as told by your health care  provider.  · Check your pulse as told by your health care provider.  Dealing with extreme temperatures  · If the weather is extremely hot:  ¨ Avoid vigorous physical activity.  ¨ Use air conditioning or fans or seek a cooler location.  ¨ Avoid caffeine and alcohol.  ¨ Wear loose-fitting, lightweight, and light-colored clothing.  · If the weather is extremely cold:  ¨ Avoid vigorous physical activity.  ¨ Layer your clothes.  ¨ Wear mittens or gloves, a hat, and a scarf when you go outside.  ¨ Avoid alcohol.  General instructions  · Manage other health conditions such as hypertension, diabetes, thyroid disease, or abnormal heart rhythms as told by your health care provider.  · Learn to manage stress. If you need help to do this, ask your health care provider.  · Plan rest periods when fatigued.  · Get ongoing education and support as needed.  · Participate in or seek rehabilitation as needed to maintain or improve independence and quality of life.  · Stay up to date with immunizations. Keeping current on pneumococcal and influenza immunizations is especially important to prevent respiratory infections.  · Keep all follow-up visits as told by your health care provider. This is important.  Contact a health care provider if:  · You have a rapid weight gain.  · You have increasing shortness of breath that is unusual for you.  · You are unable to participate in your usual physical activities.  · You tire easily.  · You cough more than normal, especially with physical activity.  · You have any swelling or more swelling in areas such as your hands, feet, ankles, or abdomen.  · You are unable to sleep because it is hard to breathe.  · You feel like your heart is beating quickly (palpitations).  · You become dizzy or light-headed when you stand up.  Get help right away if:  · You have difficulty breathing.  · You notice or your family notices a change in your awareness, such as having trouble staying awake or having difficulty  with concentration.  · You have pain or discomfort in your chest.  · You have an episode of fainting (syncope).  This information is not intended to replace advice given to you by your health care provider. Make sure you discuss any questions you have with your health care provider.  Document Released: 12/18/2006 Document Revised: 08/22/2017 Document Reviewed: 07/12/2017  [employer] Interactive Patient Education © 2017 [employer]

## 2018-09-10 NOTE — ED PROVIDER NOTES
CHIEF COMPLAINT  Chief Complaint   Patient presents with   • Abdominal Swelling     Patient states the left side of abdominal has been increasing in size for the past week.    • Shortness of Breath     Patient stated it has been increasing over the last week. Patient is on 2 liters home oxygen.        HPI  Joshua Medrano is a 52 y.o. male who presents with increasing abdominal swelling and shortness of breath over the past week.  States that he has worsening shortness of breath with exertion.  He typically does sleep in an upright position due to orthopnea at baseline.  Patient uses 2 L nasal cannula oxygen at home while sleeping however not during the day typically.  Denies chest pain.  Denies nausea or vomiting.  No abdominal pain.  Was recently admitted for GI bleed.  Not on any blood thinners at this time.  Denies any bloody stool or bloody emesis.  No dysuria hematuria.  No fevers, cough.  States that he has been compliant with his medications including spironolactone and Lasix.    REVIEW OF SYSTEMS  See HPI for further details. All other systems are negative.     PAST MEDICAL HISTORY   has a past medical history of ASTHMA; Atrial fibrillation (HCC); CAD (coronary artery disease); Chronic obstructive pulmonary disease (HCC); and Diabetes.    SOCIAL HISTORY  Social History     Social History Main Topics   • Smoking status: Former Smoker     Packs/day: 0.50     Years: 30.00     Types: Cigarettes     Quit date: 12/13/2017   • Smokeless tobacco: Never Used      Comment: 1/2-1.5 packs for 30 years   • Alcohol use No   • Drug use: No   • Sexual activity: Not on file       SURGICAL HISTORY   has a past surgical history that includes other abdominal surgery; multiple coronary artery bypass endo vein harvest (12/22/2017); oscar (12/22/2017); thoracoscopy (Left, 1/25/2018); and colonoscopy - endo (N/A, 7/25/2018).    CURRENT MEDICATIONS  Home Medications    **Home medications have not yet been reviewed for this  "encounter**         ALLERGIES  Allergies   Allergen Reactions   • Erythromycin Anaphylaxis     Rxn - 1994   • Cillins [Penicillins] Anaphylaxis and Rash     As a child, tolerated cefepime (12/2017), ceftriaxone (1/2018), cefazolin (12/2017)   • Metoprolol      Pt stated got latham and aggressive    • Shellfish Allergy Anaphylaxis     Pt stated that he is allergic to all seafood, will develop a rash and says that rash will clear up with benadryl       PHYSICAL EXAM  VITAL SIGNS: /62   Pulse 67   Temp 36.4 °C (97.5 °F)   Resp 16   Ht 1.956 m (6' 5\")   Wt (!) 160.7 kg (354 lb 4.5 oz)   SpO2 95%   BMI 42.01 kg/m²   Pulse ox interpretation: I interpret this pulse ox as normal.  Constitutional: Alert in no apparent distress.  Obese.  HENT: No signs of trauma, Bilateral external ears normal, Nose normal.   Eyes: Pupils are equal and reactive, Conjunctiva normal, Non-icteric.   Neck: Normal range of motion, No tenderness, Supple, No stridor.   Cardiovascular: Regular rate and rhythm, systolic murmurs.   Thorax & Lungs: Normal breath sounds, No respiratory distress, No wheezing, No chest tenderness.   Abdomen: Bowel sounds normal, Soft, No tenderness, No masses, No pulsatile masses. No peritoneal signs.  Skin: Warm, Dry, No erythema, No rash.   Back: No bony tenderness, No CVA tenderness.   Extremities: Intact distal pulses, bilateral lower extremity edema, No tenderness, No cyanosis  Musculoskeletal: Good range of motion in all major joints. No tenderness to palpation or major deformities noted.   Neurologic: Alert , Normal motor function and gait, Normal sensory function, No focal deficits noted.       DIAGNOSTIC STUDIES / PROCEDURES    EKG  9/9/2018 at 7:46 PM  Normal sinus rhythm at 65  First-degree AV block with IN interval of 224  QTC and QRS within normal limits  Left axis deviation  No ST elevations or depressions  No signs of acute ischemia  Unchanged from prior EKG on 8/20/2018    LABS  Labs Reviewed "   CBC WITH DIFFERENTIAL - Abnormal; Notable for the following:        Result Value    WBC 15.6 (*)     RBC 3.83 (*)     Hemoglobin 6.7 (*)     Hematocrit 26.5 (*)     MCV 69.2 (*)     MCH 17.5 (*)     MCHC 25.3 (*)     RDW 63.8 (*)     Neutrophils-Polys 80.60 (*)     Lymphocytes 6.80 (*)     Immature Granulocytes 2.10 (*)     Monos (Absolute) 0.93 (*)     Eos (Absolute) 0.57 (*)     Immature Granulocytes (abs) 0.32 (*)     Neutrophils (Absolute) 12.60 (*)     All other components within normal limits   COMP METABOLIC PANEL - Abnormal; Notable for the following:     Glucose 139 (*)     Bun 23 (*)     Creatinine 1.48 (*)     Globulin 3.8 (*)     All other components within normal limits   PROTHROMBIN TIME - Abnormal; Notable for the following:     PT 16.2 (*)     INR 1.33 (*)     All other components within normal limits    Narrative:     Indicate which anticoagulants the patient is on:->NONE   BTYPE NATRIURETIC PEPTIDE - Abnormal; Notable for the following:     B Natriuretic Peptide 468 (*)     All other components within normal limits   ESTIMATED GFR - Abnormal; Notable for the following:     GFR If Non  49 (*)     All other components within normal limits   APTT    Narrative:     Indicate which anticoagulants the patient is on:->NONE   TROPONIN    Narrative:     Indicate which anticoagulants the patient is on:->NONE   PERIPHERAL SMEAR REVIEW   PLATELET ESTIMATE   MORPHOLOGY   DIFFERENTIAL COMMENT       RADIOLOGY  DX-CHEST-PORTABLE (1 VIEW)   Final Result         1.  Pulmonary edema and/or infiltrates are identified, which are stable since the prior exam.   2.  Trace left pleural effusion   3.  Cardiomegaly          COURSE & MEDICAL DECISION MAKING  Pertinent Labs & Imaging studies reviewed. (See chart for details)  52 y.o. male with a history of CHF, COPD, CABG presenting with increased abdominal bloating.  No significant pain there.  Has baseline shortness of breath and uses supplemental oxygen  when sleeping at night.  Typically sleeps upright due to chronic orthopnea.  Denies nausea, vomiting.  No fevers or cough.  No wheezing or rales upon arrival.  X-ray was performed showing pulmonary edema that is unchanged from prior chest x-ray from recent admission.  He was admitted on 8/20/2018 and provided with diagnoses of abdominal wall cellulitis, COPD exacerbation, CHF, GI bleed.  The patient denies any blood in his stool.  No vomiting or hematemesis.  No hematuria.  Denies chest pain.     Laboratory studies were performed.  Patient has leukocytosis however this is improved compared to prior.  No obvious source of infection identified.    Given the patient's concern for increased weight due to abdominal wall swelling, with slightly elevated BNP and history of CHF, dose of Lasix was administered here.  Patient had increased urine output.  The patient was reevaluated at bedside and he does report feeling improved.  I was able to review the patient's medical record as well and he had recent admission for similar symptoms and was admitted on 8/20.  He was discharged the next day with much improved symptoms.    He was admitted in late July for GI bleed at which time he received 5 units of PRBCs.  Colonoscopy and EGD did not reveal source of GI bleed.  It had spontaneous resolution.  He was discharged with a hemoglobin of 7.0 at the time.  He does have a known chronic anemia history.  No bloody stool.  Was also able to review the review the patient's CT abdomen pelvis from his prior admission.  Did not show significant ascites.  Did not have an acute abdomen or intra-abdominal process requiring surgical intervention.    The patient was reevaluated at bedside.  Explained the results from today's workup today.  Did offer the patient admission however he elected to be discharged home.  This does appear to be reasonable given that there are no strong criteria for inpatient admission at this time and the patient did  "report clinical improvement throughout his stay.  No hypoxia or tachycardia.  No hypotension.  No active bleeding history.  I suspect that the patient's chief complaint of abdominal wall swelling will improve with continued diuresis and watching of his diet to limit sodium intake.  Urged compliance with medication use and follow-up with primary care physician immediately for further treatment.  To monitor closely for any bleeding.  To return immediately for any worsening of his symptoms or development of any other concerning signs or symptoms.  will need to continue monitoring his creatinine as well.  Has had a creatinine of 1.4 before the past.        The patient will return for worsening symptoms or failure of improvement and is stable at the time of discharge. The patient verbalizes understanding in their own words.    /62   Pulse (!) 58   Temp 36.4 °C (97.5 °F)   Resp 17   Ht 1.956 m (6' 5\")   Wt (!) 160.7 kg (354 lb 4.5 oz)   SpO2 99%   BMI 42.01 kg/m²     The patient was referred to primary care where they will receive further BP management.      [facility], Emergency Dept  [address] [place] [id number] [phone]    As needed, If symptoms worsen    Primary care doctor    In 1 day        FINAL IMPRESSION  1. Chronic congestive heart failure, unspecified heart failure type (HCC)    2. Hypervolemia, unspecified hypervolemia type    3. Chronic anemia            Electronically signed by: Isrrael Greene, 9/9/2018 8:25 PM    "

## 2018-10-07 ENCOUNTER — HOSPITAL ENCOUNTER (INPATIENT)
Facility: MEDICAL CENTER | Age: 53
LOS: 3 days | DRG: 392 | End: 2018-10-10
Attending: EMERGENCY MEDICINE | Admitting: INTERNAL MEDICINE
Payer: MEDICARE

## 2018-10-07 DIAGNOSIS — A09 DIARRHEA OF INFECTIOUS ORIGIN: ICD-10-CM

## 2018-10-07 PROBLEM — D50.9 MICROCYTIC ANEMIA: Status: ACTIVE | Noted: 2018-10-07

## 2018-10-07 PROBLEM — R19.7 DIARRHEA: Status: ACTIVE | Noted: 2018-10-07

## 2018-10-07 LAB
ALBUMIN SERPL BCP-MCNC: 3.4 G/DL (ref 3.2–4.9)
ALBUMIN/GLOB SERPL: 0.8 G/DL
ALP SERPL-CCNC: 92 U/L (ref 30–99)
ALT SERPL-CCNC: 19 U/L (ref 2–50)
ANION GAP SERPL CALC-SCNC: 11 MMOL/L (ref 0–11.9)
ANISOCYTOSIS BLD QL SMEAR: ABNORMAL
APPEARANCE UR: CLEAR
AST SERPL-CCNC: 28 U/L (ref 12–45)
BACTERIA #/AREA URNS HPF: NEGATIVE /HPF
BASOPHILS # BLD AUTO: 2.6 % (ref 0–1.8)
BASOPHILS # BLD: 0.4 K/UL (ref 0–0.12)
BILIRUB SERPL-MCNC: 0.9 MG/DL (ref 0.1–1.5)
BILIRUB UR QL STRIP.AUTO: NEGATIVE
BUN SERPL-MCNC: 46 MG/DL (ref 8–22)
CALCIUM SERPL-MCNC: 9.4 MG/DL (ref 8.5–10.5)
CHLORIDE SERPL-SCNC: 105 MMOL/L (ref 96–112)
CO2 SERPL-SCNC: 21 MMOL/L (ref 20–33)
COLOR UR: YELLOW
CREAT SERPL-MCNC: 1.5 MG/DL (ref 0.5–1.4)
CREAT UR-MCNC: 83.9 MG/DL
DACRYOCYTES BLD QL SMEAR: NORMAL
EOSINOPHIL # BLD AUTO: 0.4 K/UL (ref 0–0.51)
EOSINOPHIL NFR BLD: 2.6 % (ref 0–6.9)
EPI CELLS #/AREA URNS HPF: ABNORMAL /HPF
ERYTHROCYTE [DISTWIDTH] IN BLOOD BY AUTOMATED COUNT: 56.2 FL (ref 35.9–50)
GLOBULIN SER CALC-MCNC: 4.1 G/DL (ref 1.9–3.5)
GLUCOSE SERPL-MCNC: 115 MG/DL (ref 65–99)
GLUCOSE UR STRIP.AUTO-MCNC: NEGATIVE MG/DL
HCT VFR BLD AUTO: 27.3 % (ref 42–52)
HGB BLD-MCNC: 7.1 G/DL (ref 14–18)
HYALINE CASTS #/AREA URNS LPF: ABNORMAL /LPF
HYPOCHROMIA BLD QL SMEAR: ABNORMAL
KETONES UR STRIP.AUTO-MCNC: NEGATIVE MG/DL
LEUKOCYTE ESTERASE UR QL STRIP.AUTO: NEGATIVE
LYMPHOCYTES # BLD AUTO: 0.54 K/UL (ref 1–4.8)
LYMPHOCYTES NFR BLD: 3.5 % (ref 22–41)
MANUAL DIFF BLD: NORMAL
MCH RBC QN AUTO: 17.4 PG (ref 27–33)
MCHC RBC AUTO-ENTMCNC: 26 G/DL (ref 33.7–35.3)
MCV RBC AUTO: 66.7 FL (ref 81.4–97.8)
MICRO URNS: ABNORMAL
MICROCYTES BLD QL SMEAR: ABNORMAL
MONOCYTES # BLD AUTO: 0.54 K/UL (ref 0–0.85)
MONOCYTES NFR BLD AUTO: 3.5 % (ref 0–13.4)
MORPHOLOGY BLD-IMP: NORMAL
NEUTROPHILS # BLD AUTO: 13.52 K/UL (ref 1.82–7.42)
NEUTROPHILS NFR BLD: 86.9 % (ref 44–72)
NEUTS BAND NFR BLD MANUAL: 0.9 % (ref 0–10)
NITRITE UR QL STRIP.AUTO: NEGATIVE
NRBC # BLD AUTO: 0.09 K/UL
NRBC BLD-RTO: 0.6 /100 WBC
OVALOCYTES BLD QL SMEAR: NORMAL
PH UR STRIP.AUTO: 5.5 [PH]
PLATELET # BLD AUTO: 234 K/UL (ref 164–446)
PLATELET BLD QL SMEAR: NORMAL
PMV BLD AUTO: 10.2 FL (ref 9–12.9)
POIKILOCYTOSIS BLD QL SMEAR: NORMAL
POLYCHROMASIA BLD QL SMEAR: NORMAL
POTASSIUM SERPL-SCNC: 4.5 MMOL/L (ref 3.6–5.5)
PROT SERPL-MCNC: 7.5 G/DL (ref 6–8.2)
PROT UR QL STRIP: 30 MG/DL
RBC # BLD AUTO: 4.09 M/UL (ref 4.7–6.1)
RBC # URNS HPF: ABNORMAL /HPF
RBC BLD AUTO: PRESENT
RBC UR QL AUTO: NEGATIVE
SCHISTOCYTES BLD QL SMEAR: NORMAL
SODIUM SERPL-SCNC: 137 MMOL/L (ref 135–145)
SODIUM UR-SCNC: 58 MMOL/L
SP GR UR STRIP.AUTO: 1.02
UROBILINOGEN UR STRIP.AUTO-MCNC: 0.2 MG/DL
WBC # BLD AUTO: 15.4 K/UL (ref 4.8–10.8)
WBC #/AREA URNS HPF: ABNORMAL /HPF

## 2018-10-07 PROCEDURE — 700102 HCHG RX REV CODE 250 W/ 637 OVERRIDE(OP): Performed by: INTERNAL MEDICINE

## 2018-10-07 PROCEDURE — 700105 HCHG RX REV CODE 258: Performed by: INTERNAL MEDICINE

## 2018-10-07 PROCEDURE — 94760 N-INVAS EAR/PLS OXIMETRY 1: CPT

## 2018-10-07 PROCEDURE — 700105 HCHG RX REV CODE 258: Performed by: EMERGENCY MEDICINE

## 2018-10-07 PROCEDURE — 99285 EMERGENCY DEPT VISIT HI MDM: CPT

## 2018-10-07 PROCEDURE — 87493 C DIFF AMPLIFIED PROBE: CPT

## 2018-10-07 PROCEDURE — 99223 1ST HOSP IP/OBS HIGH 75: CPT | Mod: AI | Performed by: INTERNAL MEDICINE

## 2018-10-07 PROCEDURE — 85007 BL SMEAR W/DIFF WBC COUNT: CPT

## 2018-10-07 PROCEDURE — 89055 LEUKOCYTE ASSESSMENT FECAL: CPT

## 2018-10-07 PROCEDURE — 85027 COMPLETE CBC AUTOMATED: CPT

## 2018-10-07 PROCEDURE — 770001 HCHG ROOM/CARE - MED/SURG/GYN PRIV*

## 2018-10-07 PROCEDURE — 82570 ASSAY OF URINE CREATININE: CPT

## 2018-10-07 PROCEDURE — A9270 NON-COVERED ITEM OR SERVICE: HCPCS | Performed by: INTERNAL MEDICINE

## 2018-10-07 PROCEDURE — 81001 URINALYSIS AUTO W/SCOPE: CPT

## 2018-10-07 PROCEDURE — 700111 HCHG RX REV CODE 636 W/ 250 OVERRIDE (IP): Performed by: INTERNAL MEDICINE

## 2018-10-07 PROCEDURE — 80053 COMPREHEN METABOLIC PANEL: CPT

## 2018-10-07 PROCEDURE — 700101 HCHG RX REV CODE 250: Performed by: INTERNAL MEDICINE

## 2018-10-07 PROCEDURE — 84300 ASSAY OF URINE SODIUM: CPT

## 2018-10-07 PROCEDURE — 304561 HCHG STAT O2

## 2018-10-07 RX ORDER — BUDESONIDE AND FORMOTEROL FUMARATE DIHYDRATE 80; 4.5 UG/1; UG/1
2 AEROSOL RESPIRATORY (INHALATION) 2 TIMES DAILY
Status: DISCONTINUED | OUTPATIENT
Start: 2018-10-07 | End: 2018-10-10 | Stop reason: HOSPADM

## 2018-10-07 RX ORDER — PROMETHAZINE HYDROCHLORIDE 25 MG/1
12.5-25 SUPPOSITORY RECTAL EVERY 4 HOURS PRN
Status: DISCONTINUED | OUTPATIENT
Start: 2018-10-07 | End: 2018-10-10 | Stop reason: HOSPADM

## 2018-10-07 RX ORDER — ONDANSETRON 2 MG/ML
4 INJECTION INTRAMUSCULAR; INTRAVENOUS EVERY 4 HOURS PRN
Status: DISCONTINUED | OUTPATIENT
Start: 2018-10-07 | End: 2018-10-10 | Stop reason: HOSPADM

## 2018-10-07 RX ORDER — AMIODARONE HYDROCHLORIDE 200 MG/1
200 TABLET ORAL DAILY
Status: DISCONTINUED | OUTPATIENT
Start: 2018-10-07 | End: 2018-10-10 | Stop reason: HOSPADM

## 2018-10-07 RX ORDER — IPRATROPIUM BROMIDE AND ALBUTEROL SULFATE 2.5; .5 MG/3ML; MG/3ML
3 SOLUTION RESPIRATORY (INHALATION)
Status: DISCONTINUED | OUTPATIENT
Start: 2018-10-07 | End: 2018-10-10 | Stop reason: HOSPADM

## 2018-10-07 RX ORDER — ONDANSETRON 4 MG/1
4 TABLET, ORALLY DISINTEGRATING ORAL EVERY 4 HOURS PRN
Status: DISCONTINUED | OUTPATIENT
Start: 2018-10-07 | End: 2018-10-10 | Stop reason: HOSPADM

## 2018-10-07 RX ORDER — ALBUTEROL SULFATE 90 UG/1
2 AEROSOL, METERED RESPIRATORY (INHALATION) EVERY 6 HOURS PRN
Status: DISCONTINUED | OUTPATIENT
Start: 2018-10-07 | End: 2018-10-10 | Stop reason: HOSPADM

## 2018-10-07 RX ORDER — PROMETHAZINE HYDROCHLORIDE 25 MG/1
12.5-25 TABLET ORAL EVERY 4 HOURS PRN
Status: DISCONTINUED | OUTPATIENT
Start: 2018-10-07 | End: 2018-10-10 | Stop reason: HOSPADM

## 2018-10-07 RX ORDER — ROSUVASTATIN CALCIUM 40 MG/1
40 TABLET, COATED ORAL DAILY
COMMUNITY
End: 2019-01-04 | Stop reason: SDUPTHER

## 2018-10-07 RX ORDER — HEPARIN SODIUM 5000 [USP'U]/ML
5000 INJECTION, SOLUTION INTRAVENOUS; SUBCUTANEOUS EVERY 8 HOURS
Status: DISCONTINUED | OUTPATIENT
Start: 2018-10-07 | End: 2018-10-08

## 2018-10-07 RX ORDER — ACETAMINOPHEN 325 MG/1
650 TABLET ORAL EVERY 6 HOURS PRN
Status: DISCONTINUED | OUTPATIENT
Start: 2018-10-07 | End: 2018-10-10 | Stop reason: HOSPADM

## 2018-10-07 RX ORDER — FUROSEMIDE 20 MG/1
20 TABLET ORAL DAILY
Status: ON HOLD | COMMUNITY
End: 2018-10-31

## 2018-10-07 RX ORDER — SODIUM CHLORIDE 9 MG/ML
INJECTION, SOLUTION INTRAVENOUS CONTINUOUS
Status: DISCONTINUED | OUTPATIENT
Start: 2018-10-07 | End: 2018-10-08

## 2018-10-07 RX ORDER — METOPROLOL SUCCINATE 25 MG/1
25 TABLET, EXTENDED RELEASE ORAL DAILY
Status: DISCONTINUED | OUTPATIENT
Start: 2018-10-07 | End: 2018-10-10 | Stop reason: HOSPADM

## 2018-10-07 RX ORDER — SODIUM CHLORIDE 9 MG/ML
1000 INJECTION, SOLUTION INTRAVENOUS ONCE
Status: COMPLETED | OUTPATIENT
Start: 2018-10-07 | End: 2018-10-07

## 2018-10-07 RX ORDER — ROSUVASTATIN CALCIUM 20 MG/1
40 TABLET, COATED ORAL EVERY EVENING
Status: DISCONTINUED | OUTPATIENT
Start: 2018-10-07 | End: 2018-10-10 | Stop reason: HOSPADM

## 2018-10-07 RX ADMIN — AMIODARONE HYDROCHLORIDE 200 MG: 200 TABLET ORAL at 12:55

## 2018-10-07 RX ADMIN — BUDESONIDE AND FORMOTEROL FUMARATE DIHYDRATE 2 PUFF: 80; 4.5 AEROSOL RESPIRATORY (INHALATION) at 13:38

## 2018-10-07 RX ADMIN — BUDESONIDE AND FORMOTEROL FUMARATE DIHYDRATE 2 PUFF: 80; 4.5 AEROSOL RESPIRATORY (INHALATION) at 17:45

## 2018-10-07 RX ADMIN — SODIUM CHLORIDE: 9 INJECTION, SOLUTION INTRAVENOUS at 12:55

## 2018-10-07 RX ADMIN — IPRATROPIUM BROMIDE AND ALBUTEROL SULFATE 3 ML: .5; 3 SOLUTION RESPIRATORY (INHALATION) at 21:14

## 2018-10-07 RX ADMIN — VANCOMYCIN HYDROCHLORIDE 125 MG: 10 INJECTION, POWDER, LYOPHILIZED, FOR SOLUTION INTRAVENOUS at 13:38

## 2018-10-07 RX ADMIN — SODIUM CHLORIDE: 9 INJECTION, SOLUTION INTRAVENOUS at 17:42

## 2018-10-07 RX ADMIN — VANCOMYCIN HYDROCHLORIDE 125 MG: 10 INJECTION, POWDER, LYOPHILIZED, FOR SOLUTION INTRAVENOUS at 17:42

## 2018-10-07 RX ADMIN — METOPROLOL SUCCINATE 25 MG: 25 TABLET, EXTENDED RELEASE ORAL at 12:55

## 2018-10-07 RX ADMIN — HEPARIN SODIUM 5000 UNITS: 5000 INJECTION, SOLUTION INTRAVENOUS; SUBCUTANEOUS at 12:55

## 2018-10-07 RX ADMIN — ROSUVASTATIN CALCIUM 40 MG: 20 TABLET, FILM COATED ORAL at 17:42

## 2018-10-07 RX ADMIN — SODIUM CHLORIDE 1000 ML: 9 INJECTION, SOLUTION INTRAVENOUS at 10:24

## 2018-10-07 RX ADMIN — HEPARIN SODIUM 5000 UNITS: 5000 INJECTION, SOLUTION INTRAVENOUS; SUBCUTANEOUS at 21:05

## 2018-10-07 ASSESSMENT — ENCOUNTER SYMPTOMS
NECK PAIN: 0
WEIGHT LOSS: 0
CHILLS: 0
BACK PAIN: 0
COUGH: 0
FEVER: 0
SHORTNESS OF BREATH: 0
EYE PAIN: 0
MYALGIAS: 0
INSOMNIA: 0
FALLS: 1
ORTHOPNEA: 0
NERVOUS/ANXIOUS: 0
HEARTBURN: 0
EYE DISCHARGE: 0
BLURRED VISION: 0
DIARRHEA: 1
SPUTUM PRODUCTION: 0
EYE REDNESS: 0
DIZZINESS: 0
STRIDOR: 0
DEPRESSION: 0
VOMITING: 0
NAUSEA: 0
ABDOMINAL PAIN: 0
FOCAL WEAKNESS: 0
HEADACHES: 0
SEIZURES: 0
PALPITATIONS: 0

## 2018-10-07 ASSESSMENT — COGNITIVE AND FUNCTIONAL STATUS - GENERAL
SUGGESTED CMS G CODE MODIFIER DAILY ACTIVITY: CH
MOBILITY SCORE: 24
DAILY ACTIVITIY SCORE: 24
SUGGESTED CMS G CODE MODIFIER MOBILITY: CH

## 2018-10-07 ASSESSMENT — PATIENT HEALTH QUESTIONNAIRE - PHQ9
SUM OF ALL RESPONSES TO PHQ9 QUESTIONS 1 AND 2: 0
2. FEELING DOWN, DEPRESSED, IRRITABLE, OR HOPELESS: NOT AT ALL
1. LITTLE INTEREST OR PLEASURE IN DOING THINGS: NOT AT ALL

## 2018-10-07 ASSESSMENT — LIFESTYLE VARIABLES
EVER_SMOKED: YES
ALCOHOL_USE: NO

## 2018-10-07 ASSESSMENT — PAIN SCALES - GENERAL
PAINLEVEL_OUTOF10: 0
PAINLEVEL_OUTOF10: 0

## 2018-10-07 ASSESSMENT — COPD QUESTIONNAIRES
DO YOU EVER COUGH UP ANY MUCUS OR PHLEGM?: YES, A FEW DAYS A WEEK OR MONTH
HAVE YOU SMOKED AT LEAST 100 CIGARETTES IN YOUR ENTIRE LIFE: YES
COPD SCREENING SCORE: 6
DURING THE PAST 4 WEEKS HOW MUCH DID YOU FEEL SHORT OF BREATH: SOME OF THE TIME

## 2018-10-07 NOTE — ASSESSMENT & PLAN NOTE
Rate controlled with amiodarone and metoprolol.   Not on anticoagulation due to significant baseline anemia and diverticular bleed. Not even on antiplatelet despite CAD and diabetes history. High risk for coronary and cerebral events. Said his physicians told him not to take any antiplatelet or anticoagulation.   D/c SQ heparin for DVT prophylaxis.

## 2018-10-07 NOTE — ASSESSMENT & PLAN NOTE
No evidence to suggest infectious etiology. Resolved. C-diff neg. Reviewed labs. D/C oral Vanc.  Hydrated. Stop IVF due to history of heart failure.

## 2018-10-07 NOTE — H&P
Hospital Medicine History and Physical      Date of Service  10/7/2018    Chief Complaint  Chief Complaint   Patient presents with   • GLF     Today while trying to get to bathroom.     • Diarrhea     Starting two days ago.         History of Presenting Illness  Marcela is a 52 y.o. male PMH of COPD, essential hypertension, diabetes mellitus, atrial fibrillation, coronary artery disease who presents with diarrhea for the past today. He also fell off the bed earlier today.  He denied dizziness or syncope episode.   Denies any red color or black colored stool.  He has history of diverticular bleed in July 2018.  He was discharged home and told not to take anticoagulation for the next few weeks until cleared by GI.  He is still not taking any blood thinner because he said his doctor told him not to take them.  Denies any nausea, vomiting, abdominal pain.  In the ER he was found to have leukocytosis with white blood cell count of 15 concerning for possible Clostridium difficile related diarrhea.  He will be admitted to the floor and contact isolation.  Empiric poor oral vancomycin was started.    Primary Care Physician  Nam Le M.D.      Code Status  Full code    Review of Systems  Review of Systems   Constitutional: Negative for chills, fever and weight loss.   HENT: Negative for congestion and nosebleeds.    Eyes: Negative for blurred vision, pain, discharge and redness.   Respiratory: Negative for cough, sputum production, shortness of breath and stridor.    Cardiovascular: Negative for chest pain, palpitations and orthopnea.   Gastrointestinal: Positive for diarrhea. Negative for abdominal pain, heartburn, nausea and vomiting.   Genitourinary: Negative for dysuria, frequency and urgency.   Musculoskeletal: Positive for falls. Negative for back pain, myalgias and neck pain.   Skin: Negative for itching and rash.   Neurological: Negative for dizziness, focal weakness, seizures and headaches.    Psychiatric/Behavioral: Negative for depression. The patient is not nervous/anxious and does not have insomnia.      Please see HPI, all other systems were reviewed and are negative (AMA/CMS criteria)     Past Medical History  Past Medical History:   Diagnosis Date   • ASTHMA    • Atrial fibrillation (HCC)    • CAD (coronary artery disease)    • Chronic obstructive pulmonary disease (HCC)    • Diabetes        Surgical History  Past Surgical History:   Procedure Laterality Date   • COLONOSCOPY - ENDO N/A 7/25/2018    Procedure: COLONOSCOPY - ENDO;  Surgeon: Josiah Molina D.O.;  Location: ENDOSCOPY [facility];  Service: Gastroenterology   • THORACOSCOPY Left 1/25/2018    Procedure: THORACOSCOPY- PLEURODESIS ;  Surgeon: Chandu Paez M.D.;  Location: SURGERY [facility];  Service: General   • MULTIPLE CORONARY ARTERY BYPASS ENDO VEIN HARVEST  12/22/2017    Procedure: MULTIPLE CORONARY ARTERY BYPASS ENDO VEIN HARVEST X2;  Surgeon: Kiel Ash M.D.;  Location: SURGERY [facility];  Service: Cardiothoracic   • JIM  12/22/2017    Procedure: JIM;  Surgeon: Kiel Ash M.D.;  Location: SURGERY [facility];  Service: Cardiothoracic   • OTHER ABDOMINAL SURGERY         Medications  No current facility-administered medications on file prior to encounter.      Current Outpatient Prescriptions on File Prior to Encounter   Medication Sig Dispense Refill   • lisinopril (PRINIVIL) 2.5 MG Tab Take 1 Tab by mouth every day. 30 Tab 3   • metoprolol SR (TOPROL XL) 25 MG TABLET SR 24 HR Take 1 Tab by mouth every day. 30 Tab 3   • spironolactone (ALDACTONE) 25 MG Tab Take 1 Tab by mouth every day. 30 Tab 3   • Furosemide (LASIX PO) Take  by mouth.     • ipratropium-albuterol (DUONEB) 0.5-2.5 (3) MG/3ML nebulizer solution 3 mL by Nebulization route every 6 hours as needed for Shortness of Breath. 60 Bullet 6   • budesonide-formoterol (SYMBICORT) 80-4.5 MCG/ACT Aerosol Inhale 2 Puffs by mouth 2 Times a Day. 2  Inhaler 6   • amiodarone (CORDARONE) 200 MG Tab Take 1 Tab by mouth every day. 90 Tab 3   • rosuvastatin (CRESTOR) 40 MG tablet Take 1 Tab by mouth every evening. 90 Tab 3   • albuterol 108 (90 Base) MCG/ACT Aero Soln inhalation aerosol Inhale 2 Puffs by mouth every 6 hours as needed for Shortness of Breath. 1 Inhaler 10     Family History  Family History   Problem Relation Age of Onset   • Diabetes Mother    • Stroke Mother    • Leukemia Mother    • Diabetes Father    • No Known Problems Sister    • Heart Disease Brother         PPM   • Lung Disease Brother    • Alcohol/Drug Brother    • Diabetes Sister          Social History  Social History   Substance Use Topics   • Smoking status: Former Smoker     Packs/day: 0.50     Years: 30.00     Types: Cigarettes     Quit date: 2017   • Smokeless tobacco: Never Used      Comment: 1/2-1.5 packs for 30 years   • Alcohol use No       Allergies  Allergies   Allergen Reactions   • Erythromycin Anaphylaxis     Rxn -    • Cillins [Penicillins] Anaphylaxis and Rash     As a child, tolerated cefepime (2017), ceftriaxone (2018), cefazolin (2017)   • Metoprolol      Pt stated got latham and aggressive    • Shellfish Allergy Anaphylaxis     Pt stated that he is allergic to all seafood, will develop a rash and says that rash will clear up with benadryl        Physical Exam  Laboratory   Hemodynamics  Temp (24hrs), Av.6 °C (97.9 °F), Min:36.6 °C (97.9 °F), Max:36.6 °C (97.9 °F)   Temperature: 36.6 °C (97.9 °F)  Pulse  Av.5  Min: 58  Max: 77 Heart Rate (Monitored): 67  Blood Pressure: 118/58, NIBP: 118/57      Respiratory      Respiration: 18, Pulse Oximetry: 100 %             Physical Exam   Constitutional: He is oriented to person, place, and time. No distress.   HENT:   Head: Normocephalic and atraumatic.   Mouth/Throat: Oropharynx is clear and moist.   Eyes: Pupils are equal, round, and reactive to light. Conjunctivae and EOM are normal.   Neck: Normal  range of motion. Neck supple. No tracheal deviation present. No thyromegaly present.   Cardiovascular: Normal rate and regular rhythm.    No murmur heard.  Pulmonary/Chest: Effort normal and breath sounds normal. No respiratory distress. He has no wheezes.   Abdominal: Soft. Bowel sounds are normal. He exhibits no distension. There is no tenderness.   Musculoskeletal: He exhibits no edema or tenderness.   Neurological: He is alert and oriented to person, place, and time. No cranial nerve deficit.   Skin: Skin is warm and dry. He is not diaphoretic. No erythema.   Psychiatric: He has a normal mood and affect. His behavior is normal. Thought content normal.       Recent Labs      10/07/18   1011   WBC  15.4*   RBC  4.09*   HEMOGLOBIN  7.1*   HEMATOCRIT  27.3*   MCV  66.7*   MCH  17.4*   MCHC  26.0*   RDW  56.2*   PLATELETCT  234   MPV  10.2     Recent Labs      10/07/18   1011   SODIUM  137   POTASSIUM  4.5   CHLORIDE  105   CO2  21   GLUCOSE  115*   BUN  46*   CREATININE  1.50*   CALCIUM  9.4     Recent Labs      10/07/18   1011   ALTSGPT  19   ASTSGOT  28   ALKPHOSPHAT  92   TBILIRUBIN  0.9   GLUCOSE  115*                 Lab Results   Component Value Date    TROPONINI 0.02 09/09/2018       Imaging  No orders to display     EKG  per my independant read:  QTc: 466, HR: 65, Normal Sinus Rhythm, no ST/T changes     Assessment/Plan     I anticipate this patient will require at least two midnights for appropriate medical management, necessitating inpatient admission.    Microcytic anemia- (present on admission)   Assessment & Plan    Hemoglobin stable at baseline        Diarrhea- (present on admission)   Assessment & Plan    Concerning for possible Clostridium difficile related diarrhea  Contact isolation  check stool for C. Difficile  Started empirically on oral vancomycin          LINSEY (acute kidney injury) (HCC)- (present on admission)   Assessment & Plan    Likely prerenal  Started on IV fluid  Avoid nephrotoxic  drugs  Follow CMP  Check UA and urine electrolytes        Paroxysmal atrial fibrillation (HCC)- (present on admission)   Assessment & Plan    Rate control continue amiodarone and metoprolol  Not on anticoagulation        Type 2 diabetes mellitus with complication, without long-term current use of insulin (HCC)- (present on admission)   Assessment & Plan    On sliding scale insulin            Prophylaxis:  sc heparin

## 2018-10-07 NOTE — ED NOTES
Patient incontinent to stool.  Stool liquid, foul smelling.  Patient cleaned and linens changed.  Cdiff precautions taken.  Contact cart ordered.

## 2018-10-07 NOTE — ED NOTES
Transport here for patient.     Patient on call light.  IV accidentally removed by Patient.  Patient received full fluid bolus.

## 2018-10-07 NOTE — ASSESSMENT & PLAN NOTE
Hemoglobin stable at baseline on admission but dropped to 6.4 after fluids resuscitation. He was transfused 1 pRBC overnight.  Noted to have iron deficiency, started on IV iron replacement therapy

## 2018-10-07 NOTE — CARE PLAN
Problem: Safety  Goal: Will remain free from injury  Outcome: PROGRESSING AS EXPECTED  Bed low and locked, call light and belongings in reach, hourly rounding in place, pt calls appropriately for assistance    Problem: Knowledge Deficit  Goal: Knowledge of disease process/condition, treatment plan, diagnostic tests, and medications will improve  Outcome: PROGRESSING AS EXPECTED  Welcome packet given  Educated on floor policies, call light usage, fall precautions

## 2018-10-07 NOTE — ED NOTES
Med rec updated and complete  Allergies reviewed  Pt reports that he is not sure all the medications he takes and asked if I could call his wife.  Called pts wife (Lisa) [phone] to verify all medications.  Per wife reports no antibiotics in the last 30 days.  Per wife reports no vitamins or OTC's.

## 2018-10-07 NOTE — ED PROVIDER NOTES
ED Provider Note    Scribed for Stalin Luis M.D. by Ilsa Martinez. 10/7/2018  9:42 AM    Primary care provider: Nam Le M.D.  Means of arrival: ambulance   History obtained from: patient   History limited by: none     CHIEF COMPLAINT  Chief Complaint   Patient presents with   • GLF     Today while trying to get to bathroom.     • Diarrhea     Starting two days ago.         HPI  Joshua Medrano is a 52 y.o. male with a history of atrial fibrillation, CAD, COPD, and diabetes who presents to the Emergency Department via ambulance for evaluation of worsening diarrhea which began 2 days ago. Patient reports associated abdominal pain. He describes his diarrhea as explosive in nature and states it is very watery. Additionally, patient reports he experienced a ground level fall while trying to get to the restroom this morning. He denies recent antibiotic use and states the last time he received antibiotics was in August. There are no exacerbating or alleviating factors identified at this time. No other acute medical complaints or concerns.     REVIEW OF SYSTEMS  Pertinent positives include diarrhea, abdominal pain.  All other systems reviewed and negative.    PAST MEDICAL HISTORY   has a past medical history of ASTHMA; Atrial fibrillation (HCC); CAD (coronary artery disease); Chronic obstructive pulmonary disease (HCC); and Diabetes.    SURGICAL HISTORY   has a past surgical history that includes other abdominal surgery; multiple coronary artery bypass endo vein harvest (12/22/2017); oscar (12/22/2017); thoracoscopy (Left, 1/25/2018); and colonoscopy - endo (N/A, 7/25/2018).    SOCIAL HISTORY  Social History   Substance Use Topics   • Smoking status: Former Smoker     Packs/day: 0.50     Years: 30.00     Types: Cigarettes     Quit date: 12/13/2017   • Smokeless tobacco: Never Used      Comment: 1/2-1.5 packs for 30 years   • Alcohol use No      History   Drug Use No       FAMILY HISTORY  Family History   Problem  "Relation Age of Onset   • Diabetes Mother    • Stroke Mother    • Leukemia Mother    • Diabetes Father    • No Known Problems Sister    • Heart Disease Brother         PPM   • Lung Disease Brother    • Alcohol/Drug Brother    • Diabetes Sister        CURRENT MEDICATIONS  Home Medications     Reviewed by Hari Frias R.N. (Registered Nurse) on 10/07/18 at 0934  Med List Status: Complete   Medication Last Dose Status   albuterol 108 (90 Base) MCG/ACT Aero Soln inhalation aerosol prn Active   amiodarone (CORDARONE) 200 MG Tab 10/6/2018 Active   budesonide-formoterol (SYMBICORT) 80-4.5 MCG/ACT Aerosol 10/6/2018 Active   Furosemide (LASIX PO) 10/6/2018 Active   ipratropium-albuterol (DUONEB) 0.5-2.5 (3) MG/3ML nebulizer solution prn Active   lisinopril (PRINIVIL) 2.5 MG Tab 10/6/2018 Active   metoprolol SR (TOPROL XL) 25 MG TABLET SR 24 HR 10/6/2018 Active   rosuvastatin (CRESTOR) 40 MG tablet 10/6/2018 Active   spironolactone (ALDACTONE) 25 MG Tab 10/6/2018 Active                ALLERGIES  Allergies   Allergen Reactions   • Erythromycin Anaphylaxis     Rxn - 1994   • Cillins [Penicillins] Anaphylaxis and Rash     As a child, tolerated cefepime (12/2017), ceftriaxone (1/2018), cefazolin (12/2017)   • Metoprolol      Pt stated got latham and aggressive    • Shellfish Allergy Anaphylaxis     Pt stated that he is allergic to all seafood, will develop a rash and says that rash will clear up with benadryl       PHYSICAL EXAM  VITAL SIGNS: /58   Pulse 68   Temp 36.6 °C (97.9 °F)   Resp (!) 22   Ht 1.956 m (6' 5\")   Wt (!) 160.6 kg (354 lb)   SpO2 98%   BMI 41.98 kg/m²     Vital signs reviewed.  Constitutional:  Obese male. Mild distress.   Head: Normocephalic.   Mouth/Throat: Oropharynx is clear and dry.   Eyes: EOM are normal. Pupils are equal, round, and reactive to light.   Neck: Normal range of motion. Neck supple.   Cardiovascular: Normal rate, regular rhythm and normal heart sounds.    Pulmonary/Chest: " Effort normal and breath sounds normal. No wheezes. Median sternotomy scar noted.   Abdominal: Soft. Thickening of the panus. There is no tenderness. There is no rebound and no guarding.   Musculoskeletal: Chronic venous insufficiency to lower extremities, right worse than left.   Lymphadenopathy: No cervical adenopathy.   Neurological: Patient is alert and oriented to person, place, and time. CNs II - XII intact. DTRs intact. Normal sensation and strength.  Skin: Skin is warm and dry. Median sternotomy scar noted. Chronic venous insufficiency to lower extremities, right worse than left.   Psychiatric: Patient has a normal mood and affect. Behavior is normal.     LABS  Results for orders placed or performed during the hospital encounter of 10/07/18   CBC WITH DIFFERENTIAL   Result Value Ref Range    WBC 15.4 (H) 4.8 - 10.8 K/uL    RBC 4.09 (L) 4.70 - 6.10 M/uL    Hemoglobin 7.1 (L) 14.0 - 18.0 g/dL    Hematocrit 27.3 (L) 42.0 - 52.0 %    MCV 66.7 (L) 81.4 - 97.8 fL    MCH 17.4 (L) 27.0 - 33.0 pg    MCHC 26.0 (L) 33.7 - 35.3 g/dL    RDW 56.2 (H) 35.9 - 50.0 fL    Platelet Count 234 164 - 446 K/uL    MPV 10.2 9.0 - 12.9 fL    Nucleated RBC 0.60 /100 WBC    NRBC (Absolute) 0.09 K/uL    Neutrophils-Polys 86.90 (H) 44.00 - 72.00 %    Lymphocytes 3.50 (L) 22.00 - 41.00 %    Monocytes 3.50 0.00 - 13.40 %    Eosinophils 2.60 0.00 - 6.90 %    Basophils 2.60 (H) 0.00 - 1.80 %    Neutrophils (Absolute) 13.52 (H) 1.82 - 7.42 K/uL    Lymphs (Absolute) 0.54 (L) 1.00 - 4.80 K/uL    Monos (Absolute) 0.54 0.00 - 0.85 K/uL    Eos (Absolute) 0.40 0.00 - 0.51 K/uL    Baso (Absolute) 0.40 (H) 0.00 - 0.12 K/uL    Hypochromia 2+     Anisocytosis 2+     Microcytosis 3+    COMP METABOLIC PANEL   Result Value Ref Range    Sodium 137 135 - 145 mmol/L    Potassium 4.5 3.6 - 5.5 mmol/L    Chloride 105 96 - 112 mmol/L    Co2 21 20 - 33 mmol/L    Anion Gap 11.0 0.0 - 11.9    Glucose 115 (H) 65 - 99 mg/dL    Bun 46 (H) 8 - 22 mg/dL    Creatinine  1.50 (H) 0.50 - 1.40 mg/dL    Calcium 9.4 8.5 - 10.5 mg/dL    AST(SGOT) 28 12 - 45 U/L    ALT(SGPT) 19 2 - 50 U/L    Alkaline Phosphatase 92 30 - 99 U/L    Total Bilirubin 0.9 0.1 - 1.5 mg/dL    Albumin 3.4 3.2 - 4.9 g/dL    Total Protein 7.5 6.0 - 8.2 g/dL    Globulin 4.1 (H) 1.9 - 3.5 g/dL    A-G Ratio 0.8 g/dL   ESTIMATED GFR   Result Value Ref Range    GFR If  59 (A) >60 mL/min/1.73 m 2    GFR If Non African American 49 (A) >60 mL/min/1.73 m 2   DIFFERENTIAL MANUAL   Result Value Ref Range    Bands-Stabs 0.90 0.00 - 10.00 %    Manual Diff Status PERFORMED    PERIPHERAL SMEAR REVIEW   Result Value Ref Range    Peripheral Smear Review see below    PLATELET ESTIMATE   Result Value Ref Range    Plt Estimation Normal    MORPHOLOGY   Result Value Ref Range    RBC Morphology Present     Polychromia 1+     Poikilocytosis 2+     Ovalocytes 1+     Schistocytes 1+     Tear Drop Cells 1+    Urine Sodium Random   Result Value Ref Range    Sodium, Urine -per volume 58 mmol/L   Urine Creatinine Random   Result Value Ref Range    Creatinine, Random Urine 83.90 mg/dL   Urinalysis   Result Value Ref Range    Color Yellow     Character Clear     Specific Gravity 1.017 <1.035    Ph 5.5 5.0 - 8.0    Glucose Negative Negative mg/dL    Ketones Negative Negative mg/dL    Protein 30 (A) Negative mg/dL    Bilirubin Negative Negative    Urobilinogen, Urine 0.2 Negative    Nitrite Negative Negative    Leukocyte Esterase Negative Negative    Occult Blood Negative Negative    Micro Urine Req Microscopic    URINE MICROSCOPIC (W/UA)   Result Value Ref Range    WBC 2-5 (A) /hpf    RBC 0-2 (A) /hpf    Bacteria Negative None /hpf    Epithelial Cells Few /hpf    Hyaline Cast 0-2 /lpf     All labs reviewed by me.    COURSE & MEDICAL DECISION MAKING  Pertinent Labs & Imaging studies reviewed. (See chart for details) The patient's [facility] and past medical  records were reviewed and showed the patient was admitted for cellulitis  to his abdomen on August 20th. He received antibiotics at that time.      9:42 AM - Patient seen and examined at bedside. The patient will be resuscitated with 1L NS IV for signs of clinical dehydration. Ordered C diff, cultures stool, stool WBC's, CBC, CMP to evaluate his symptoms. The differential diagnoses include but are not limited to: infectious diarrhea vs dehydration vs C diff.     11:17 AM- Reviewed the patient's lab results.     11:21 AM- Paged the hospitalist to admit.     11:23 AM- Spoke with Dr. Palma, hospitalist, concerning patient case. Agreed to admit patient for further treatment and evaluation.     11:29 AM- Patient was reevaluated at bedside. Patient had a good response to IV fluids as he now has moist mucous membranes. Discussed lab and radiology results with the patient and informed them that he will be admitted for further care. He consents to this.    HYDRATION: Based on the patient's presentation of signs of clinical dehydration the patient was given IV fluids. IV Hydration was used because oral hydration was inadequate to hydrated given his massive explosive diarrhea. Upon recheck following hydration, the patient was improved.    DISPOSITION:  Patient will be admitted to Dr. Palma, hospitalist, in guarded condition.    FINAL IMPRESSION  1. Diarrhea of infectious origin        Ilsa LARA (Fernando), am scribing for, and in the presence of, Stalin Luis M.D..    Electronically signed by: Ilsa Martinez (Fernando), 10/7/2018    Stalin LARA M.D. personally performed the services described in this documentation, as scribed by Ilsa Martinez in my presence, and it is both accurate and complete. C.     The note accurately reflects work and decisions made by me.  Stalin Luis  10/7/2018  1:54 PM

## 2018-10-07 NOTE — ED TRIAGE NOTES
"  Chief Complaint   Patient presents with   • GLF     Today while trying to get to bathroom.     • Diarrhea     Starting two days ago.       BIB [facility] from home for above.  Patient denies injury from fall. Denies CP/SOB/Dizziness from fall. Patient states that diarrhea is watery in nature, explosive.      Normally on 2 L NC for COPD.    /58   Pulse 68   Temp 36.6 °C (97.9 °F)   Resp (!) 22   Ht 1.956 m (6' 5\")   Wt (!) 160.6 kg (354 lb)   SpO2 98%   BMI 41.98 kg/m²     "

## 2018-10-07 NOTE — ASSESSMENT & PLAN NOTE
Likely prerenal from fluid losses and potentially overdiuresing. Has had recent history of LINSEY per chart review. May be cardiorenal too considering slow response. Patient was discharged on spironolactone and lisinopril in September. Unclear if he has been taking it.  Avoid nephrotoxic drugs  Follow CMP  Check UA and urine electrolytes  Consider switching crestor to Lipitor and moderate strength.

## 2018-10-07 NOTE — PROGRESS NOTES
Received report from GIFTY COPELAND at 1157  Pt [facility]4    Ambulated from gurney to bed with steady gait though pt has hx of falling  ------------------------------------------------------------------------    2 RN skin check complete:    Bilateral feet dry, calloused, cracked, discolored  BLE dry, flaky, discolored  No redness noted to pannus  Sacrum pink but blanching    --------------------------------------------------------------------------    Admit profile complete:  Pt refusing vaccines  Pt wears full dentures but they are at home  -----------------------------------------------------------------------

## 2018-10-08 LAB
ABO GROUP BLD: NORMAL
ALBUMIN SERPL BCP-MCNC: 3.3 G/DL (ref 3.2–4.9)
ALBUMIN/GLOB SERPL: 0.9 G/DL
ALP SERPL-CCNC: 90 U/L (ref 30–99)
ALT SERPL-CCNC: 18 U/L (ref 2–50)
ANION GAP SERPL CALC-SCNC: 8 MMOL/L (ref 0–11.9)
AST SERPL-CCNC: 22 U/L (ref 12–45)
BARCODED ABORH UBTYP: 600
BARCODED PRD CODE UBPRD: NORMAL
BARCODED UNIT NUM UBUNT: NORMAL
BILIRUB SERPL-MCNC: 0.8 MG/DL (ref 0.1–1.5)
BLD GP AB SCN SERPL QL: NORMAL
BNP SERPL-MCNC: 473 PG/ML (ref 0–100)
BUN SERPL-MCNC: 43 MG/DL (ref 8–22)
C DIFF DNA SPEC QL NAA+PROBE: NEGATIVE
C DIFF TOX GENS STL QL NAA+PROBE: NEGATIVE
CALCIUM SERPL-MCNC: 9.1 MG/DL (ref 8.5–10.5)
CHLORIDE SERPL-SCNC: 106 MMOL/L (ref 96–112)
CO2 SERPL-SCNC: 21 MMOL/L (ref 20–33)
COMPONENT R 8504R: NORMAL
CREAT SERPL-MCNC: 1.47 MG/DL (ref 0.5–1.4)
ERYTHROCYTE [DISTWIDTH] IN BLOOD BY AUTOMATED COUNT: 57.1 FL (ref 35.9–50)
FERRITIN SERPL-MCNC: 14 NG/ML (ref 22–322)
GLOBULIN SER CALC-MCNC: 3.7 G/DL (ref 1.9–3.5)
GLUCOSE SERPL-MCNC: 139 MG/DL (ref 65–99)
HCT VFR BLD AUTO: 26.3 % (ref 42–52)
HGB BLD-MCNC: 6.4 G/DL (ref 14–18)
IRON SATN MFR SERPL: 5 % (ref 15–55)
IRON SERPL-MCNC: 21 UG/DL (ref 50–180)
MCH RBC QN AUTO: 16.4 PG (ref 27–33)
MCHC RBC AUTO-ENTMCNC: 24.3 G/DL (ref 33.7–35.3)
MCV RBC AUTO: 67.4 FL (ref 81.4–97.8)
PLATELET # BLD AUTO: 240 K/UL (ref 164–446)
PMV BLD AUTO: 10.1 FL (ref 9–12.9)
POTASSIUM SERPL-SCNC: 4.5 MMOL/L (ref 3.6–5.5)
PRODUCT TYPE UPROD: NORMAL
PROT SERPL-MCNC: 7 G/DL (ref 6–8.2)
RBC # BLD AUTO: 3.9 M/UL (ref 4.7–6.1)
RH BLD: NORMAL
SODIUM SERPL-SCNC: 135 MMOL/L (ref 135–145)
TIBC SERPL-MCNC: 385 UG/DL (ref 250–450)
UNIT STATUS USTAT: NORMAL
WBC # BLD AUTO: 16.8 K/UL (ref 4.8–10.8)
WBC STL QL MICRO: NORMAL

## 2018-10-08 PROCEDURE — 86900 BLOOD TYPING SEROLOGIC ABO: CPT

## 2018-10-08 PROCEDURE — 36415 COLL VENOUS BLD VENIPUNCTURE: CPT

## 2018-10-08 PROCEDURE — 36430 TRANSFUSION BLD/BLD COMPNT: CPT

## 2018-10-08 PROCEDURE — 30233N1 TRANSFUSION OF NONAUTOLOGOUS RED BLOOD CELLS INTO PERIPHERAL VEIN, PERCUTANEOUS APPROACH: ICD-10-PCS | Performed by: HOSPITALIST

## 2018-10-08 PROCEDURE — 83550 IRON BINDING TEST: CPT

## 2018-10-08 PROCEDURE — 700105 HCHG RX REV CODE 258

## 2018-10-08 PROCEDURE — 85027 COMPLETE CBC AUTOMATED: CPT

## 2018-10-08 PROCEDURE — 86901 BLOOD TYPING SEROLOGIC RH(D): CPT

## 2018-10-08 PROCEDURE — 87046 STOOL CULTR AEROBIC BACT EA: CPT

## 2018-10-08 PROCEDURE — 83540 ASSAY OF IRON: CPT

## 2018-10-08 PROCEDURE — 80053 COMPREHEN METABOLIC PANEL: CPT

## 2018-10-08 PROCEDURE — 83880 ASSAY OF NATRIURETIC PEPTIDE: CPT

## 2018-10-08 PROCEDURE — 700105 HCHG RX REV CODE 258: Performed by: INTERNAL MEDICINE

## 2018-10-08 PROCEDURE — 770001 HCHG ROOM/CARE - MED/SURG/GYN PRIV*

## 2018-10-08 PROCEDURE — A9270 NON-COVERED ITEM OR SERVICE: HCPCS | Performed by: INTERNAL MEDICINE

## 2018-10-08 PROCEDURE — 87899 AGENT NOS ASSAY W/OPTIC: CPT

## 2018-10-08 PROCEDURE — 87045 FECES CULTURE AEROBIC BACT: CPT

## 2018-10-08 PROCEDURE — 94640 AIRWAY INHALATION TREATMENT: CPT

## 2018-10-08 PROCEDURE — 700101 HCHG RX REV CODE 250: Performed by: INTERNAL MEDICINE

## 2018-10-08 PROCEDURE — 86850 RBC ANTIBODY SCREEN: CPT

## 2018-10-08 PROCEDURE — 94760 N-INVAS EAR/PLS OXIMETRY 1: CPT

## 2018-10-08 PROCEDURE — 99232 SBSQ HOSP IP/OBS MODERATE 35: CPT | Performed by: HOSPITALIST

## 2018-10-08 PROCEDURE — P9016 RBC LEUKOCYTES REDUCED: HCPCS

## 2018-10-08 PROCEDURE — 700102 HCHG RX REV CODE 250 W/ 637 OVERRIDE(OP): Performed by: INTERNAL MEDICINE

## 2018-10-08 PROCEDURE — 86923 COMPATIBILITY TEST ELECTRIC: CPT

## 2018-10-08 PROCEDURE — 82728 ASSAY OF FERRITIN: CPT

## 2018-10-08 RX ORDER — FUROSEMIDE 10 MG/ML
20 INJECTION INTRAMUSCULAR; INTRAVENOUS
Status: DISCONTINUED | OUTPATIENT
Start: 2018-10-08 | End: 2018-10-08

## 2018-10-08 RX ORDER — SODIUM CHLORIDE 9 MG/ML
INJECTION, SOLUTION INTRAVENOUS
Status: COMPLETED
Start: 2018-10-08 | End: 2018-10-08

## 2018-10-08 RX ORDER — POTASSIUM CITRATE 10 MEQ/1
10 TABLET, EXTENDED RELEASE ORAL DAILY
Status: DISCONTINUED | OUTPATIENT
Start: 2018-10-09 | End: 2018-10-08

## 2018-10-08 RX ADMIN — VANCOMYCIN HYDROCHLORIDE 125 MG: 10 INJECTION, POWDER, LYOPHILIZED, FOR SOLUTION INTRAVENOUS at 00:27

## 2018-10-08 RX ADMIN — ALBUTEROL SULFATE 2 PUFF: 90 AEROSOL, METERED RESPIRATORY (INHALATION) at 20:24

## 2018-10-08 RX ADMIN — SODIUM CHLORIDE: 9 INJECTION, SOLUTION INTRAVENOUS at 14:02

## 2018-10-08 RX ADMIN — ROSUVASTATIN CALCIUM 40 MG: 20 TABLET, FILM COATED ORAL at 17:12

## 2018-10-08 RX ADMIN — ALBUTEROL SULFATE 2 PUFF: 90 AEROSOL, METERED RESPIRATORY (INHALATION) at 09:55

## 2018-10-08 RX ADMIN — VANCOMYCIN HYDROCHLORIDE 125 MG: 10 INJECTION, POWDER, LYOPHILIZED, FOR SOLUTION INTRAVENOUS at 13:51

## 2018-10-08 RX ADMIN — METOPROLOL SUCCINATE 25 MG: 25 TABLET, EXTENDED RELEASE ORAL at 05:03

## 2018-10-08 RX ADMIN — AMIODARONE HYDROCHLORIDE 200 MG: 200 TABLET ORAL at 05:03

## 2018-10-08 RX ADMIN — IPRATROPIUM BROMIDE AND ALBUTEROL SULFATE 3 ML: .5; 3 SOLUTION RESPIRATORY (INHALATION) at 12:07

## 2018-10-08 RX ADMIN — ALBUTEROL SULFATE 2 PUFF: 90 AEROSOL, METERED RESPIRATORY (INHALATION) at 03:36

## 2018-10-08 RX ADMIN — SODIUM CHLORIDE 500 ML: 9 INJECTION, SOLUTION INTRAVENOUS at 06:44

## 2018-10-08 RX ADMIN — VANCOMYCIN HYDROCHLORIDE 125 MG: 10 INJECTION, POWDER, LYOPHILIZED, FOR SOLUTION INTRAVENOUS at 17:12

## 2018-10-08 RX ADMIN — BUDESONIDE AND FORMOTEROL FUMARATE DIHYDRATE 2 PUFF: 80; 4.5 AEROSOL RESPIRATORY (INHALATION) at 17:12

## 2018-10-08 RX ADMIN — BUDESONIDE AND FORMOTEROL FUMARATE DIHYDRATE 2 PUFF: 80; 4.5 AEROSOL RESPIRATORY (INHALATION) at 05:03

## 2018-10-08 RX ADMIN — VANCOMYCIN HYDROCHLORIDE 125 MG: 10 INJECTION, POWDER, LYOPHILIZED, FOR SOLUTION INTRAVENOUS at 06:45

## 2018-10-08 ASSESSMENT — ENCOUNTER SYMPTOMS
EYES NEGATIVE: 1
ABDOMINAL PAIN: 0
PALPITATIONS: 0
COUGH: 0
ORTHOPNEA: 0
SPUTUM PRODUCTION: 0
CONSTITUTIONAL NEGATIVE: 1
HEMOPTYSIS: 0
DIARRHEA: 1
CLAUDICATION: 0
DEPRESSION: 0

## 2018-10-08 ASSESSMENT — PAIN SCALES - GENERAL
PAINLEVEL_OUTOF10: 0

## 2018-10-08 NOTE — PROGRESS NOTES
Bedside report received.  Assessment complete.  A&O x 4. Patient calls appropriately.  Patient up with SBA assist. Bed alarm on.   Patient has 0/10 pain. Declines intervention at this time  Denies N&V. Tolerating GI soft  Diet.  + void, + flatus  Patient denies SOB.  SCD's off.    Review plan with of care with patient. Call light and personal belongings with in reach. Hourly rounding in place. All needs met at this time.

## 2018-10-08 NOTE — DIETARY
NUTRITION SERVICES: BMI - Pt with BMI >40 (=41.9) - morbid obesity, class III. Weight loss counseling not appropriate in acute care setting.     RECOMMEND - Referral to outpatient nutrition services for weight management after D/C.     RD will con't to monitor per dept policy

## 2018-10-08 NOTE — RESPIRATORY CARE
COPD EDUCATION by COPD CLINICAL EDUCATOR  10/8/2018 at 12:07 PM by Gila Patten     Patient reviewed by COPD education team. Patient does not qualify for COPD program.

## 2018-10-08 NOTE — PROGRESS NOTES
Assumed care at 0700. Received report from night shift RN. Bedside report completed. AOx4.    Denies pain.  Denies nausea.  Tolerating diet. +voiding. -BM  IVF infusing with blood transfusion.   O2 saturation 92% on [id number]  Ambulating with standby assist. Pt call light and belongings within reach, fall precautions in place.

## 2018-10-08 NOTE — PROGRESS NOTES
[facility]ist Progress Note    Date of Service: 10/8/2018    Chief Complaint  52M appearing older than staged age with history of COPD, chronically elevated wbc, PAFB not on anticoagulation,  diverticular bleed and significant anemia, CHF among others admitted 10/17 for presumed infectious diarrhea. Diarrhea resolved. Single bowel movement today that was semi solid, neg for C-diff.  Deconditioned. Hemoglobin of 6.4 (7.1 on admission) s/p 1 unit pRBC transfusion overnight. Stable.         Interval Problem Update  Patient was recently admitted in September but wanted to go home after 2 days of hospital stay following GI bleed and CHF exacerbation treatment. He says he is stable currently. Denies diarrhea. Received 1 pRBC unit transfusion overnight when hemoglobin dropped to 6.4 from 7.1 on admission while on IV fluids. He denies any melena, hematochezia, hemoptysis or hematemesis. He has been receiving IV fluids.    Of note, his LVEF was previously 25% but when he was placed on dobutamine, his LVEF improved to 60%. Unclear if he still on it or not. Not started on admission. Patient is unable to convey this to me. On recent hospital discharge dobutamine was not among his regimen. He was also not discharged on antiplatelet due to his significant anemia from diverticular bleed.    Consultants/Specialty  None    Disposition  Pending additional w/u.         Review of Systems   Constitutional: Negative.    HENT: Negative for hearing loss and tinnitus.    Eyes: Negative.    Respiratory: Negative for cough, hemoptysis and sputum production.    Cardiovascular: Negative for chest pain, palpitations, orthopnea and claudication.   Gastrointestinal: Positive for diarrhea. Negative for abdominal pain.        Diarrhea resolved.   Genitourinary: Negative for dysuria, frequency, hematuria and urgency.   Psychiatric/Behavioral: Negative for depression and suicidal ideas.      Physical Exam  Laboratory/Imaging   Hemodynamics  Temp  (24hrs), Av.6 °C (97.8 °F), Min:36 °C (96.8 °F), Max:37 °C (98.6 °F)   Temperature: 36.2 °C (97.1 °F)  Pulse  Av.2  Min: 60  Max: 86    Blood Pressure: 106/76      Respiratory      Respiration: 18, Pulse Oximetry: 95 %, O2 Daily Delivery Respiratory : Silicone Nasal Cannula     Given By:: Mouthpiece  RUL Breath Sounds: Clear, RML Breath Sounds: Diminished, RLL Breath Sounds: Diminished, GENNARO Breath Sounds: Clear, LLL Breath Sounds: Diminished    Fluids    Intake/Output Summary (Last 24 hours) at 10/08/18 1833  Last data filed at 10/08/18 1600   Gross per 24 hour   Intake             3605 ml   Output             2350 ml   Net             1255 ml       Nutrition  Orders Placed This Encounter   Procedures   • Diet Order Low Fiber(GI Soft)     Standing Status:   Standing     Number of Occurrences:   1     Order Specific Question:   Diet:     Answer:   Low Fiber(GI Soft) [2]     Physical Exam   Constitutional: He is oriented to person, place, and time.   Morbidly obese deconditioned male in NAD. Siting in bed with bed elevated.    Cardiovascular: Normal rate, regular rhythm and normal heart sounds.    Pulmonary/Chest: Effort normal and breath sounds normal. No respiratory distress. He has no wheezes. He has no rales.   Diminished air movement   Abdominal: Soft. Bowel sounds are normal. He exhibits no distension. There is no tenderness. There is no rebound and no guarding.   Obese abdomen/rotund.   Musculoskeletal: Normal range of motion.   Neurological: He is alert and oriented to person, place, and time.   Psychiatric: He has a normal mood and affect.       Recent Labs      10/07/18   1011  10/08/18   0254   WBC  15.4*  16.8*   RBC  4.09*  3.90*   HEMOGLOBIN  7.1*  6.4*   HEMATOCRIT  27.3*  26.3*   MCV  66.7*  67.4*   MCH  17.4*  16.4*   MCHC  26.0*  24.3*   RDW  56.2*  57.1*   PLATELETCT  234  240   MPV  10.2  10.1     Recent Labs      10/07/18   1011  10/08/18   0254   SODIUM  137  135   POTASSIUM  4.5  4.5    CHLORIDE  105  106   CO2  21  21   GLUCOSE  115*  139*   BUN  46*  43*   CREATININE  1.50*  1.47*   CALCIUM  9.4  9.1                      Assessment/Plan     Congestive heart failure (HCC)- (present on admission)   Assessment & Plan    Patient with history of significant CHF with LVEF as low as 25% before he was placed on dobutamine.   Last echo with LVEF of 60% but has not been on dobutamine. Clinical findings concerning for semi compensated heart failure.  Check echo, daily BNP  D/c fluids.  Renal impairment may be cardiorenal as well.  Fluids restrict, low salt        Microcytic anemia- (present on admission)   Assessment & Plan    Hemoglobin stable at baseline on admission but dropped to 6.4 after fluids resuscitation. He was transfused 1 pRBC overnight.  I have ordered ferritin and iron panel to determine if anemia of chronic inflammation or iron deficiency.  If iron def, start oral iron.  Previous labs did not include Ferritin.          Diarrhea- (present on admission)   Assessment & Plan    No evidence to suggest infectious etiology. Resolved. C-diff neg. Reviewed labs. D/C oral Vanc.  Hydrated. Stop IVF due to history of heart failure.        LINSEY (acute kidney injury) (HCC)- (present on admission)   Assessment & Plan    Likely prerenal from fluid losses and potentially overdiuresing. Has had recent history of LINSEY per chart review. May be cardiorenal too considering slow response. Patient was discharged on spironolactone and lisinopril in September. Unclear if he has been taking it.  Started on IV fluid which I stopped today due to his CHF history.   Avoid nephrotoxic drugs  Follow CMP  Check UA and urine electrolytes  Consider switching crestor to Lipitor and moderate strength.        Paroxysmal atrial fibrillation (HCC)- (present on admission)   Assessment & Plan    Rate controlled with amiodarone and metoprolol.   Not on anticoagulation due to significant baseline anemia and diverticular bleed. Not even  on antiplatelet despite CAD and diabetes history. High risk for coronary and cerebral events. Said his physicians told him not to take any antiplatelet or anticoagulation.   D/c SQ heparin for DVT prophylaxis.         Leukocytosis- (present on admission)   Assessment & Plan    Chronic, recurrent. Unclear etiology. Will require peripheral smear to r/o underlying hematopoietic malignancy.  Recommend referral to oncology as outpatient and to send to leukemia/lymphoma panel.          Type 2 diabetes mellitus with complication, without long-term current use of insulin (HCC)- (present on admission)   Assessment & Plan    Continue sliding scale insulin          Quality-Core Measures

## 2018-10-08 NOTE — CARE PLAN
Problem: Communication  Goal: The ability to communicate needs accurately and effectively will improve  Outcome: PROGRESSING AS EXPECTED  Plan of care reviewed with patient, patient oriented to room and call light, encouraged patient to voice needs and notify staff of needs     Problem: Safety  Goal: Will remain free from falls  Outcome: PROGRESSING AS EXPECTED  Bed locked and in lowest position, side rails up x 2, bed alarm on, call light within reach, patient educated not to exit bed without staff present

## 2018-10-09 LAB
ANISOCYTOSIS BLD QL SMEAR: ABNORMAL
BASOPHILS # BLD AUTO: 0.9 % (ref 0–1.8)
BASOPHILS # BLD: 0.15 K/UL (ref 0–0.12)
E COLI SXT1+2 STL IA: NORMAL
EOSINOPHIL # BLD AUTO: 0.28 K/UL (ref 0–0.51)
EOSINOPHIL NFR BLD: 1.7 % (ref 0–6.9)
ERYTHROCYTE [DISTWIDTH] IN BLOOD BY AUTOMATED COUNT: 61.2 FL (ref 35.9–50)
HCT VFR BLD AUTO: 28.6 % (ref 42–52)
HGB BLD-MCNC: 7.1 G/DL (ref 14–18)
HYPOCHROMIA BLD QL SMEAR: ABNORMAL
LYMPHOCYTES # BLD AUTO: 0.87 K/UL (ref 1–4.8)
LYMPHOCYTES NFR BLD: 5.3 % (ref 22–41)
MANUAL DIFF BLD: NORMAL
MCH RBC QN AUTO: 17.3 PG (ref 27–33)
MCHC RBC AUTO-ENTMCNC: 24.8 G/DL (ref 33.7–35.3)
MCV RBC AUTO: 69.8 FL (ref 81.4–97.8)
MICROCYTES BLD QL SMEAR: ABNORMAL
MONOCYTES # BLD AUTO: 0.43 K/UL (ref 0–0.85)
MONOCYTES NFR BLD AUTO: 2.6 % (ref 0–13.4)
MORPHOLOGY BLD-IMP: NORMAL
MYELOCYTES NFR BLD MANUAL: 0.9 %
NEUTROPHILS # BLD AUTO: 14.53 K/UL (ref 1.82–7.42)
NEUTROPHILS NFR BLD: 88.6 % (ref 44–72)
NRBC # BLD AUTO: 0.13 K/UL
NRBC BLD-RTO: 0.8 /100 WBC
PLATELET # BLD AUTO: 209 K/UL (ref 164–446)
PLATELET BLD QL SMEAR: NORMAL
PMV BLD AUTO: 9.5 FL (ref 9–12.9)
RBC # BLD AUTO: 4.1 M/UL (ref 4.7–6.1)
RBC BLD AUTO: PRESENT
SIGNIFICANT IND 70042: NORMAL
SITE SITE: NORMAL
SOURCE SOURCE: NORMAL
WBC # BLD AUTO: 16.4 K/UL (ref 4.8–10.8)

## 2018-10-09 PROCEDURE — 83880 ASSAY OF NATRIURETIC PEPTIDE: CPT

## 2018-10-09 PROCEDURE — 36415 COLL VENOUS BLD VENIPUNCTURE: CPT

## 2018-10-09 PROCEDURE — 85027 COMPLETE CBC AUTOMATED: CPT

## 2018-10-09 PROCEDURE — 85007 BL SMEAR W/DIFF WBC COUNT: CPT

## 2018-10-09 PROCEDURE — A9270 NON-COVERED ITEM OR SERVICE: HCPCS | Performed by: INTERNAL MEDICINE

## 2018-10-09 PROCEDURE — 770001 HCHG ROOM/CARE - MED/SURG/GYN PRIV*

## 2018-10-09 PROCEDURE — 99232 SBSQ HOSP IP/OBS MODERATE 35: CPT | Performed by: INTERNAL MEDICINE

## 2018-10-09 PROCEDURE — 94640 AIRWAY INHALATION TREATMENT: CPT

## 2018-10-09 PROCEDURE — 94760 N-INVAS EAR/PLS OXIMETRY 1: CPT

## 2018-10-09 PROCEDURE — 700101 HCHG RX REV CODE 250: Performed by: INTERNAL MEDICINE

## 2018-10-09 PROCEDURE — 700102 HCHG RX REV CODE 250 W/ 637 OVERRIDE(OP): Performed by: INTERNAL MEDICINE

## 2018-10-09 PROCEDURE — 700111 HCHG RX REV CODE 636 W/ 250 OVERRIDE (IP): Performed by: INTERNAL MEDICINE

## 2018-10-09 RX ADMIN — IRON SUCROSE 200 MG: 20 INJECTION, SOLUTION INTRAVENOUS at 10:23

## 2018-10-09 RX ADMIN — ENOXAPARIN SODIUM 40 MG: 100 INJECTION SUBCUTANEOUS at 13:31

## 2018-10-09 RX ADMIN — ALBUTEROL SULFATE 2 PUFF: 90 AEROSOL, METERED RESPIRATORY (INHALATION) at 23:50

## 2018-10-09 RX ADMIN — IPRATROPIUM BROMIDE AND ALBUTEROL SULFATE 3 ML: .5; 3 SOLUTION RESPIRATORY (INHALATION) at 14:12

## 2018-10-09 RX ADMIN — BUDESONIDE AND FORMOTEROL FUMARATE DIHYDRATE 2 PUFF: 80; 4.5 AEROSOL RESPIRATORY (INHALATION) at 05:43

## 2018-10-09 RX ADMIN — IPRATROPIUM BROMIDE AND ALBUTEROL SULFATE 3 ML: .5; 3 SOLUTION RESPIRATORY (INHALATION) at 00:59

## 2018-10-09 RX ADMIN — BUDESONIDE AND FORMOTEROL FUMARATE DIHYDRATE 2 PUFF: 80; 4.5 AEROSOL RESPIRATORY (INHALATION) at 17:50

## 2018-10-09 RX ADMIN — ALBUTEROL SULFATE 2 PUFF: 90 AEROSOL, METERED RESPIRATORY (INHALATION) at 10:32

## 2018-10-09 RX ADMIN — ROSUVASTATIN CALCIUM 40 MG: 20 TABLET, FILM COATED ORAL at 17:51

## 2018-10-09 ASSESSMENT — ENCOUNTER SYMPTOMS
DEPRESSION: 0
EYES NEGATIVE: 1
CONSTITUTIONAL NEGATIVE: 1
COUGH: 0
SHORTNESS OF BREATH: 0
DIARRHEA: 0
PALPITATIONS: 0
FOCAL WEAKNESS: 0
ABDOMINAL PAIN: 0
HEADACHES: 0
CLAUDICATION: 0
FEVER: 0
CHILLS: 0

## 2018-10-09 ASSESSMENT — PAIN SCALES - GENERAL: PAINLEVEL_OUTOF10: 0

## 2018-10-09 NOTE — CARE PLAN
Problem: Safety  Goal: Will remain free from injury  Outcome: PROGRESSING AS EXPECTED  Call light within reach. Bed in lowest and locked position. 1 assist to bathroom.    Problem: Mobility  Goal: Risk for activity intolerance will decrease    Intervention: Assess and monitor signs of activity intolerance  Patient monitored for activity intolerance while ambulating to bathroom. Patient tolerated well.

## 2018-10-09 NOTE — DOCUMENTATION QUERY
DOCUMENTATION QUERY    PROVIDERS: Please select “Cosign w/ note”to reply to query.    To better represent the severity of illness of your patient, please review the following information and exercise your independent professional judgment in responding to this query.     Heart failure present on admission with clinical findings of LVEF as low as 25% before he was placed on dobutamine and last echo with LVEF of 60% are concerning for semi compensated heart failure is documented in the progress note dated 10/8/18.       Based upon the clinical findings, risk factors, and treatment, can this diagnosis of heart failure be further specified?    • Acute Systolic heart failure   • Chronic Systolic heart failure  • Acute on Chronic Systolic heart failure  • Acute Diastolic heart failure   • Chronic Diastolic heart failure  • Acute on Chronic Diastolic heart failure  • Acute Systolic and Diastolic heart failure  • Chronic Systolic and Diastolic heart failure  • Acute on Chronic Systolic and diastolic heart failure  • Other explanation of clinical findings  • Unable to determine           The medical record reflects the following:   Clinical Findings Heart failure POA without further specification    Treatment Echo   Daily BNP  DC IV fluids   Restrict fluids and low salt diet    Risk Factors Gastroenteritis   Acute kidney failure  Drop in hemoglobin   Iron deficiency anemia  Paroxysmal atrial fibrillation   Type 2 diabetes  COPD  Heart disease  Leukocytosis   Morbid obesity   Hypertension    Location within medical record History and Physical and Progress Notes     Thank you,   eMghan Lyons RN  Clinical   [phone]

## 2018-10-09 NOTE — ASSESSMENT & PLAN NOTE
Patient with history of significant CHF with LVEF as low as 25% before he was placed on dobutamine.   Last echo with LVEF of 60% but has not been on dobutamine. Clinical findings concerning for semi compensated heart failure.  Check echo pending  D/c fluids.  Renal impairment may be cardiorenal as well.  Fluids restrict, low salt

## 2018-10-09 NOTE — PROGRESS NOTES
Assumed care at 0700. Received report from night shift RN. Bedside report completed. AOx4.    Denies pain.  Denies nausea.  Tolerating diet. +voiding. LBM 10/8    On [id number], has been using albuterol prn.  Ambulating with standby assist. Pt call light and belongings within reach, fall precautions in place

## 2018-10-09 NOTE — PROGRESS NOTES
[facility] Hospitalist Progress Note    Date of Service: 10/9/2018    Chief Complaint  52M appearing older than staged age with history of COPD, chronically elevated wbc, PAFB not on anticoagulation,  diverticular bleed and significant anemia, CHF among others admitted 10/17 for presumed infectious diarrhea. Diarrhea resolved. Single bowel movement today that was semi solid, neg for C-diff.           Interval Problem Update  Patient noted to have iron def anemia, started on IV iron therapy. Diarrhea resolved. Echo pending    Consultants/Specialty  None    Disposition  Pending additional w/u.         Review of Systems   Constitutional: Negative.  Negative for chills and fever.   HENT: Negative for hearing loss and tinnitus.    Eyes: Negative.    Respiratory: Negative for cough and shortness of breath.    Cardiovascular: Negative for chest pain, palpitations and claudication.   Gastrointestinal: Negative for abdominal pain and diarrhea.   Genitourinary: Negative.  Negative for dysuria, frequency, hematuria and urgency.   Neurological: Negative for focal weakness and headaches.   Psychiatric/Behavioral: Negative for depression.      Physical Exam  Laboratory/Imaging   Hemodynamics  Temp (24hrs), Av.3 °C (97.3 °F), Min:36.1 °C (97 °F), Max:36.6 °C (97.9 °F)   Temperature: 36.4 °C (97.6 °F)  Pulse  Av.5  Min: 60  Max: 86    Blood Pressure: 118/78      Respiratory      Respiration: 18, Pulse Oximetry: 94 %, O2 Daily Delivery Respiratory : Silicone Nasal Cannula     Given By:: Mouthpiece, Work Of Breathing / Effort: Mild  RUL Breath Sounds: Clear, RML Breath Sounds: Diminished, RLL Breath Sounds: Diminished, GENNARO Breath Sounds: Clear, LLL Breath Sounds: Diminished    Fluids    Intake/Output Summary (Last 24 hours) at 10/09/18 1227  Last data filed at 10/09/18 1156   Gross per 24 hour   Intake             1480 ml   Output              950 ml   Net              530 ml       Nutrition  Orders Placed This Encounter    Procedures   • Diet Order Low Fiber(GI Soft)     Standing Status:   Standing     Number of Occurrences:   1     Order Specific Question:   Diet:     Answer:   Low Fiber(GI Soft) [2]     Physical Exam   Constitutional: He is oriented to person, place, and time. No distress.   Morbidly obese deconditioned male in NAD.   HENT:   Head: Normocephalic and atraumatic.   Right Ear: External ear normal.   Left Ear: External ear normal.   Eyes: Conjunctivae are normal. Right eye exhibits no discharge. Left eye exhibits no discharge.   Neck: Normal range of motion. Neck supple.   Cardiovascular: Normal rate, regular rhythm and normal heart sounds.    Pulmonary/Chest: Effort normal. No respiratory distress. He has no wheezes.   Decreased BS diffusely     Abdominal: Soft. Bowel sounds are normal. He exhibits no distension. There is no tenderness.   Obese abdomen/rotund.   Musculoskeletal: Normal range of motion.   Neurological: He is alert and oriented to person, place, and time.   Skin: He is not diaphoretic.   Psychiatric: He has a normal mood and affect.       Recent Labs      10/07/18   1011  10/08/18   0254   WBC  15.4*  16.8*   RBC  4.09*  3.90*   HEMOGLOBIN  7.1*  6.4*   HEMATOCRIT  27.3*  26.3*   MCV  66.7*  67.4*   MCH  17.4*  16.4*   MCHC  26.0*  24.3*   RDW  56.2*  57.1*   PLATELETCT  234  240   MPV  10.2  10.1     Recent Labs      10/07/18   1011  10/08/18   0254   SODIUM  137  135   POTASSIUM  4.5  4.5   CHLORIDE  105  106   CO2  21  21   GLUCOSE  115*  139*   BUN  46*  43*   CREATININE  1.50*  1.47*   CALCIUM  9.4  9.1         Recent Labs      10/08/18   2044   BNPBTYPENAT  473*              Assessment/Plan     Congestive heart failure (HCC)- (present on admission)   Assessment & Plan    Patient with history of significant CHF with LVEF as low as 25% before he was placed on dobutamine.   Last echo with LVEF of 60% but has not been on dobutamine. Clinical findings concerning for semi compensated heart  failure.  Check echo pending  D/c fluids.  Renal impairment may be cardiorenal as well.  Fluids restrict, low salt        Microcytic anemia- (present on admission)   Assessment & Plan    Hemoglobin stable at baseline on admission but dropped to 6.4 after fluids resuscitation. He was transfused 1 pRBC overnight.  Noted to have iron deficiency, started on IV iron replacement therapy          Diarrhea- (present on admission)   Assessment & Plan    No evidence to suggest infectious etiology. Resolved. C-diff neg. Reviewed labs. D/C oral Vanc.  Hydrated. Stop IVF due to history of heart failure.        LINSEY (acute kidney injury) (HCC)- (present on admission)   Assessment & Plan    Likely prerenal from fluid losses and potentially overdiuresing. Has had recent history of LINSEY per chart review. May be cardiorenal too considering slow response. Patient was discharged on spironolactone and lisinopril in September. Unclear if he has been taking it.  Avoid nephrotoxic drugs  Follow CMP  Check UA and urine electrolytes  Consider switching crestor to Lipitor and moderate strength.        Paroxysmal atrial fibrillation (HCC)- (present on admission)   Assessment & Plan    Rate controlled with amiodarone and metoprolol.   Not on anticoagulation due to significant baseline anemia and diverticular bleed. Not even on antiplatelet despite CAD and diabetes history. High risk for coronary and cerebral events. Said his physicians told him not to take any antiplatelet or anticoagulation.   D/c SQ heparin for DVT prophylaxis.         Leukocytosis- (present on admission)   Assessment & Plan    Chronic, recurrent. Unclear etiology. Will require peripheral smear to r/o underlying hematopoietic malignancy.  Recommend referral to oncology as outpatient and to send to leukemia/lymphoma panel.          Type 2 diabetes mellitus with complication, without long-term current use of insulin (HCC)- (present on admission)   Assessment & Plan    Continue  sliding scale insulin          Quality-Core Measures   Reviewed items::  Labs reviewed and Medications reviewed  Ceballos catheter::  No Ceballos  DVT prophylaxis pharmacological::  Enoxaparin (Lovenox)

## 2018-10-09 NOTE — ASSESSMENT & PLAN NOTE
Chronic, recurrent. Unclear etiology. Will require peripheral smear to r/o underlying hematopoietic malignancy.  Recommend referral to oncology as outpatient and to send to leukemia/lymphoma panel.

## 2018-10-10 ENCOUNTER — APPOINTMENT (OUTPATIENT)
Dept: CARDIOLOGY | Facility: MEDICAL CENTER | Age: 53
DRG: 392 | End: 2018-10-10
Attending: HOSPITALIST
Payer: MEDICARE

## 2018-10-10 VITALS
BODY MASS INDEX: 37.19 KG/M2 | TEMPERATURE: 97.4 F | DIASTOLIC BLOOD PRESSURE: 64 MMHG | SYSTOLIC BLOOD PRESSURE: 113 MMHG | OXYGEN SATURATION: 95 % | RESPIRATION RATE: 20 BRPM | HEART RATE: 66 BPM | WEIGHT: 315 LBS | HEIGHT: 77 IN

## 2018-10-10 PROBLEM — D72.829 LEUKOCYTOSIS: Status: RESOLVED | Noted: 2018-01-24 | Resolved: 2018-10-10

## 2018-10-10 PROBLEM — N17.9 AKI (ACUTE KIDNEY INJURY) (HCC): Status: RESOLVED | Noted: 2017-04-30 | Resolved: 2018-10-10

## 2018-10-10 PROBLEM — R19.7 DIARRHEA: Status: RESOLVED | Noted: 2018-10-07 | Resolved: 2018-10-10

## 2018-10-10 LAB
BNP SERPL-MCNC: 568 PG/ML (ref 0–100)
LV EJECT FRACT  99904: 55

## 2018-10-10 PROCEDURE — 93306 TTE W/DOPPLER COMPLETE: CPT | Mod: 26 | Performed by: INTERNAL MEDICINE

## 2018-10-10 PROCEDURE — 94760 N-INVAS EAR/PLS OXIMETRY 1: CPT

## 2018-10-10 PROCEDURE — 700111 HCHG RX REV CODE 636 W/ 250 OVERRIDE (IP): Performed by: INTERNAL MEDICINE

## 2018-10-10 PROCEDURE — 93306 TTE W/DOPPLER COMPLETE: CPT

## 2018-10-10 PROCEDURE — 700101 HCHG RX REV CODE 250: Performed by: INTERNAL MEDICINE

## 2018-10-10 PROCEDURE — 94640 AIRWAY INHALATION TREATMENT: CPT

## 2018-10-10 PROCEDURE — 99239 HOSP IP/OBS DSCHRG MGMT >30: CPT | Performed by: INTERNAL MEDICINE

## 2018-10-10 RX ORDER — LANOLIN ALCOHOL/MO/W.PET/CERES
325 CREAM (GRAM) TOPICAL
Qty: 90 TAB | Refills: 0 | Status: SHIPPED | OUTPATIENT
Start: 2018-10-10 | End: 2018-10-27

## 2018-10-10 RX ADMIN — ALBUTEROL SULFATE 2 PUFF: 90 AEROSOL, METERED RESPIRATORY (INHALATION) at 11:30

## 2018-10-10 RX ADMIN — BUDESONIDE AND FORMOTEROL FUMARATE DIHYDRATE 2 PUFF: 80; 4.5 AEROSOL RESPIRATORY (INHALATION) at 06:22

## 2018-10-10 RX ADMIN — IRON SUCROSE 200 MG: 20 INJECTION, SOLUTION INTRAVENOUS at 06:20

## 2018-10-10 RX ADMIN — ENOXAPARIN SODIUM 40 MG: 100 INJECTION SUBCUTANEOUS at 06:20

## 2018-10-10 RX ADMIN — IPRATROPIUM BROMIDE AND ALBUTEROL SULFATE 3 ML: .5; 3 SOLUTION RESPIRATORY (INHALATION) at 05:07

## 2018-10-10 ASSESSMENT — PAIN SCALES - GENERAL: PAINLEVEL_OUTOF10: 0

## 2018-10-10 NOTE — DISCHARGE INSTRUCTIONS
Discharge Instructions    Discharged to home by car with relative. Discharged via wheelchair, hospital escort: Yes.  Special equipment needed: Not Applicable    Be sure to schedule a follow-up appointment with your primary care doctor or any specialists as instructed.     Discharge Plan:   Influenza Vaccine Indication: Patient Refuses    I understand that a diet low in cholesterol, fat, and sodium is recommended for good health. Unless I have been given specific instructions below for another diet, I accept this instruction as my diet prescription.   Other diet: regular    Special Instructions: None    · Is patient discharged on Warfarin / Coumadin?   No     Depression / Suicide Risk    As you are discharged from this [facility] facility, it is important to learn how to keep safe from harming yourself.    Recognize the warning signs:  · Abrupt changes in personality, positive or negative- including increase in energy   · Giving away possessions  · Change in eating patterns- significant weight changes-  positive or negative  · Change in sleeping patterns- unable to sleep or sleeping all the time   · Unwillingness or inability to communicate  · Depression  · Unusual sadness, discouragement and loneliness  · Talk of wanting to die  · Neglect of personal appearance   · Rebelliousness- reckless behavior  · Withdrawal from people/activities they love  · Confusion- inability to concentrate     If you or a loved one observes any of these behaviors or has concerns about self-harm, here's what you can do:  · Talk about it- your feelings and reasons for harming yourself  · Remove any means that you might use to hurt yourself (examples: pills, rope, extension cords, firearm)  · Get professional help from the community (Mental Health, Substance Abuse, psychological counseling)  · Do not be alone:Call your Safe Contact- someone whom you trust who will be there for you.  · Call your local CRISIS HOTLINE 632-9590 or  109.439.1750  · Call your local Children's Mobile Crisis Response Team [place] (672) 386-7560 or www.[employer]  · Call the toll free National Suicide Prevention Hotlines   · National Suicide Prevention Lifeline 588-070-CAIG (7965)  · National Hope Line Network 800-SUICIDE (820-3548)      Diarrhea, Adult  Diarrhea is frequent loose and watery bowel movements. Diarrhea can make you feel weak and cause you to become dehydrated. Dehydration can make you tired and thirsty, cause you to have a dry mouth, and decrease how often you urinate. Diarrhea typically lasts 2-3 days. However, it can last longer if it is a sign of something more serious. It is important to treat your diarrhea as told by your health care provider.  Follow these instructions at home:  Eating and drinking  Follow these recommendations as told by your health care provider:  · Take an oral rehydration solution (ORS). This is a drink that is sold at pharmacies and retail stores.  · Drink clear fluids, such as water, ice chips, diluted fruit juice, and low-calorie sports drinks.  · Eat bland, easy-to-digest foods in small amounts as you are able. These foods include bananas, applesauce, rice, lean meats, toast, and crackers.  · Avoid drinking fluids that contain a lot of sugar or caffeine, such as energy drinks, sports drinks, and soda.  · Avoid alcohol.  · Avoid spicy or fatty foods.  General instructions  · Drink enough fluid to keep your urine clear or pale yellow.  · Wash your hands often. If soap and water are not available, use hand .  · Make sure that all people in your household wash their hands well and often.  · Take over-the-counter and prescription medicines only as told by your health care provider.  · Rest at home while you recover.  · Watch your condition for any changes.  · Take a warm bath to relieve any burning or pain from frequent diarrhea episodes.  · Keep all follow-up visits as told by your health care provider.  This is important.  Contact a health care provider if:  · You have a fever.  · Your diarrhea gets worse.  · You have new symptoms.  · You cannot keep fluids down.  · You feel light-headed or dizzy.  · You have a headache  · You have muscle cramps.  Get help right away if:  · You have chest pain.  · You feel extremely weak or you faint.  · You have bloody or black stools or stools that look like tar.  · You have severe pain, cramping, or bloating in your abdomen.  · You have trouble breathing or you are breathing very quickly.  · Your heart is beating very quickly.  · Your skin feels cold and clammy.  · You feel confused.  · You have signs of dehydration, such as:  ¨ Dark urine, very little urine, or no urine.  ¨ Cracked lips.  ¨ Dry mouth.  ¨ Sunken eyes.  ¨ Sleepiness.  ¨ Weakness.  This information is not intended to replace advice given to you by your health care provider. Make sure you discuss any questions you have with your health care provider.  Document Released: 12/08/2003 Document Revised: 04/27/2017 Document Reviewed: 08/23/2016  Elsevier Interactive Patient Education © 2017 [employer]

## 2018-10-10 NOTE — CARE PLAN
Patient was seen last week and was prescribed an antibiotic and states that she still isn't feeling well and now has a sore throat so would like a follow up call.    Can be reached on mobile #.    Thank you!   Problem: Safety  Goal: Will remain free from injury  Outcome: PROGRESSING AS EXPECTED  Refused bed alarm despite education, patient educated not to get out of bed without staff present    Problem: Bowel/Gastric:  Goal: Normal bowel function is maintained or improved  Outcome: PROGRESSING AS EXPECTED  Diarrhea has resolved, normal, formed BM yesterday

## 2018-10-10 NOTE — DISCHARGE SUMMARY
Discharge Summary    CHIEF COMPLAINT ON ADMISSION  Chief Complaint   Patient presents with   • GLF     Today while trying to get to bathroom.     • Diarrhea     Starting two days ago.         Reason for Admission  Diahhrea/fall     Admission Date  10/7/2018    CODE STATUS  Full Code    HPI & HOSPITAL COURSE  Please see H&P dictated by Dr. Palma for further details.  This is a 52 y.o. male here with diarrhea.  Patient had workup to rule out infectious causes.  Patient's C. difficile PCR was negative.  Patient's stool culture is negative for Shiga toxins 1 and 2.  Patient's stool cultures pending for Salmonella, Shigella, Campylobacter, vibrio.  Patient's diarrhea resolved during his hospital stay.  She is leukocytosis trended down during his hospital stay.  He was afebrile and vital signs are stable at time of discharge.  Patient was noted to have blood loss anemia and required 1 unit PRBC transfusion during his hospital stay.  Patient was also noted to have iron deficiency and is discharged home on ferrous sulfate 325 mg 3 times a day.  Patient did not have signs of decompensated heart failure.  Not appear to be in acute heart failure exacerbation.  Patient is to follow-up with his PCP and cardiology as an outpatient for repeat echocardiogram.       Therefore, he is discharged in good and stable condition to home with close outpatient follow-up.    The patient met 2-midnight criteria for an inpatient stay at the time of discharge.    Discharge Date  10/10/18    FOLLOW UP ITEMS POST DISCHARGE  PCP for final stool culture results.    DISCHARGE DIAGNOSES  Active Problems:    Congestive heart failure (HCC) POA: Yes    Type 2 diabetes mellitus with complication, without long-term current use of insulin (HCC) POA: Yes    Paroxysmal atrial fibrillation (HCC) POA: Yes    Microcytic anemia POA: Yes  Resolved Problems:    Leukocytosis POA: Yes    LINSEY (acute kidney injury) (HCC) POA: Yes    Diarrhea POA: Yes      FOLLOW  UP  Future Appointments  Date Time Provider Department Center   10/11/2018 9:40 AM [facility] MANAGER [address]   10/17/2018 10:20 AM Zohreh James M.D. [address]   10/24/2018 1:15 PM Jett Bedoya M.D. [facility] None     [facility] [address]  [address], [address]  [place] [address] [phone]  Call in 1 day        MEDICATIONS ON DISCHARGE     Medication List      START taking these medications      Instructions   ferrous sulfate 325 (65 Fe) MG EC tablet   Take 1 Tab by mouth 3 times a day, with meals.  Dose:  325 mg        CONTINUE taking these medications      Instructions   albuterol 108 (90 Base) MCG/ACT Aers inhalation aerosol   Inhale 2 Puffs by mouth every 6 hours as needed for Shortness of Breath.  Dose:  2 Puff     amiodarone 200 MG Tabs  Commonly known as:  CORDARONE   Take 1 Tab by mouth every day.  Dose:  200 mg     CRESTOR 40 MG tablet  Generic drug:  rosuvastatin   Take 40 mg by mouth every day.  Dose:  40 mg     furosemide 20 MG Tabs  Commonly known as:  LASIX   Take 20 mg by mouth every day.  Dose:  20 mg     ipratropium-albuterol 0.5-2.5 (3) MG/3ML nebulizer solution  Commonly known as:  DUONEB   3 mL by Nebulization route every 6 hours as needed for Shortness of Breath.  Dose:  3 mL     lisinopril 2.5 MG Tabs  Commonly known as:  PRINIVIL   Take 1 Tab by mouth every day.  Dose:  2.5 mg     metoprolol SR 25 MG Tb24  Commonly known as:  TOPROL XL   Take 1 Tab by mouth every day.  Dose:  25 mg     spironolactone 25 MG Tabs  Commonly known as:  ALDACTONE   Take 1 Tab by mouth every day.  Dose:  25 mg            Allergies  Allergies   Allergen Reactions   • Cillins [Penicillins] Anaphylaxis and Rash     As a child, tolerated cefepime (12/2017), ceftriaxone (1/2018), cefazolin (12/2017)   • Erythromycin Anaphylaxis     Rxn - 1994   • Metoprolol      Pt stated got latham and aggressive, in demi dose's per wife.      • Shellfish Allergy Rash     Pt  stated that he is allergic to all seafood, will develop a rash and says that rash will clear up with benadryl       DIET  Orders Placed This Encounter   Procedures   • Diet Order Low Fiber(GI Soft)     Standing Status:   Standing     Number of Occurrences:   1     Order Specific Question:   Diet:     Answer:   Low Fiber(GI Soft) [2]     Order Specific Question:   Electrolyte modifications:     Answer:   No Added Salt [2]       ACTIVITY  As tolerated.  Weight bearing as tolerated    CONSULTATIONS  None    PROCEDURES  None    LABORATORY  Lab Results   Component Value Date    SODIUM 135 10/08/2018    POTASSIUM 4.5 10/08/2018    CHLORIDE 106 10/08/2018    CO2 21 10/08/2018    GLUCOSE 139 (H) 10/08/2018    BUN 43 (H) 10/08/2018    CREATININE 1.47 (H) 10/08/2018        Lab Results   Component Value Date    WBC 16.4 (H) 10/09/2018    HEMOGLOBIN 7.1 (L) 10/09/2018    HEMATOCRIT 28.6 (L) 10/09/2018    PLATELETCT 209 10/09/2018      This dictation was created using voice recognition software. The accuracy of the dictation is limited to the abilities of the software. I expect there may be some errors of grammar and possibly content.    Total time of the discharge process exceeds 39 minutes.

## 2018-10-10 NOTE — HEART FAILURE PROGRAM
"Cardiovascular Nurse Navigator () Advanced Heart Failure Program Inpatient Progress Note:    This patient was admitted on 10/7 for suspected infectious diarrhea.     Notes indicating \"semi-compensated\" heart failure. Patient has not received any diuresis, he is on a non-monitored floor, and repeat echo ordered but not complete.     No discharge planning or therapy notes. Apparently diarrhea has resolved. Cdif (-).    Patient's metoprolol was held this morning for a blood pressure of 115/67.    We will need clearer documentation as to whether or not this patient is being diagnosed with acute heart failure on this admission to plan appropriately for outpatient follow up.     Patient currently has an appointment on 10/23. If he's in acute HF, this will need to be moved up to take place within seven calendar days of discharge.    Hospital schedulers are here seven days a week, [id number] and can be reached at extension [phone], they will handle the seven day follow up appointment for you if you call them!    Not eligible for REMSA with Medicaid and not in the ACO so not eligible for outpatient care management.    Patient is either admitted or visits the ER monthly. Order to SW to assess for any social determinants that may be affecting this.  Thank you and please call with questions or concerns.    "

## 2018-10-10 NOTE — PROGRESS NOTES
Discharging Patient home per physician order.  Discharged with family.  Demonstrated understanding of discharge instructions, follow up appointments, home medications, prescriptions, home care for surgical wound, and nursing care instructions for diarrhea.  Ambulating without assistance, voiding without difficulty, pain well controlled, tolerating oral medications, oxygen saturation greater than 90% , tolerating diet.   Educational handouts given and discussed.  Verbalized understanding of discharge instructions and educational handouts.  All questions answered.  Belongings with patient at time of discharge.

## 2018-10-10 NOTE — PROGRESS NOTES
"Assumed care of patient from night shift RN.  Patient is alert and oriented times 4, denies pain at this time.  VSS /56   Pulse 67   Temp 36.7 °C (98 °F)   Resp 20   Ht 1.956 m (6' 5\")   Wt (!) 160.6 kg (354 lb)   SpO2 97%   BMI 41.98 kg/m²   PIV in the LAC, patent and saline locked.  On 2L oxygen, which is baseline at home at night.  On RA currently with saturations in the 90s.   in use.  Patient with history of Afib.  Last BM 10/9, urinating without difficulty.  Low fiber soft GI diet, tolerating well.  Discoloration with healing scabs to the RLE.  Patient is a standby assist, demonstrates steady gait, minimal assistance needed.  Patient refusing bed alarm despite education.    POC discussed for the day, bed is locked and in the lowest position, call light is within reach.  All needs are met at this time, hourly rounding is in place.  "

## 2018-10-11 ENCOUNTER — PATIENT OUTREACH (OUTPATIENT)
Dept: HEALTH INFORMATION MANAGEMENT | Facility: OTHER | Age: 53
End: 2018-10-11

## 2018-10-11 LAB
BACTERIA STL CULT: NORMAL
E COLI SXT1+2 STL IA: NORMAL
SIGNIFICANT IND 70042: NORMAL
SITE SITE: NORMAL
SOURCE SOURCE: NORMAL

## 2018-10-11 NOTE — PROGRESS NOTES
10/11/18 10:04am:  CM post discharge outreach call. Spouse answered telephone.  Reports patient currently not available. Requesting CM call back at a later time.    10/11/18 11:50am:  CM post discharge call completed.

## 2018-10-27 ENCOUNTER — APPOINTMENT (OUTPATIENT)
Dept: RADIOLOGY | Facility: MEDICAL CENTER | Age: 53
DRG: 291 | End: 2018-10-27
Attending: STUDENT IN AN ORGANIZED HEALTH CARE EDUCATION/TRAINING PROGRAM
Payer: OTHER MISCELLANEOUS

## 2018-10-27 ENCOUNTER — APPOINTMENT (OUTPATIENT)
Dept: RADIOLOGY | Facility: MEDICAL CENTER | Age: 53
DRG: 291 | End: 2018-10-27
Attending: EMERGENCY MEDICINE
Payer: OTHER MISCELLANEOUS

## 2018-10-27 ENCOUNTER — APPOINTMENT (OUTPATIENT)
Dept: RADIOLOGY | Facility: MEDICAL CENTER | Age: 53
DRG: 291 | End: 2018-10-27
Attending: SURGERY
Payer: OTHER MISCELLANEOUS

## 2018-10-27 ENCOUNTER — HOSPITAL ENCOUNTER (INPATIENT)
Facility: MEDICAL CENTER | Age: 53
LOS: 4 days | DRG: 291 | End: 2018-10-31
Attending: EMERGENCY MEDICINE | Admitting: INTERNAL MEDICINE
Payer: OTHER MISCELLANEOUS

## 2018-10-27 ENCOUNTER — APPOINTMENT (OUTPATIENT)
Dept: RADIOLOGY | Facility: MEDICAL CENTER | Age: 53
DRG: 291 | End: 2018-10-27
Attending: HOSPITALIST
Payer: OTHER MISCELLANEOUS

## 2018-10-27 DIAGNOSIS — J45.20 MILD INTERMITTENT ASTHMA WITHOUT COMPLICATION: ICD-10-CM

## 2018-10-27 DIAGNOSIS — I50.33 ACUTE ON CHRONIC DIASTOLIC CONGESTIVE HEART FAILURE (HCC): ICD-10-CM

## 2018-10-27 DIAGNOSIS — J44.1 ACUTE EXACERBATION OF CHRONIC OBSTRUCTIVE PULMONARY DISEASE (COPD) (HCC): ICD-10-CM

## 2018-10-27 DIAGNOSIS — S46.911A STRAIN OF RIGHT SHOULDER, INITIAL ENCOUNTER: ICD-10-CM

## 2018-10-27 DIAGNOSIS — S22.41XA CLOSED FRACTURE OF MULTIPLE RIBS OF RIGHT SIDE, INITIAL ENCOUNTER: ICD-10-CM

## 2018-10-27 DIAGNOSIS — S20.211A CONTUSION OF RIGHT CHEST WALL, INITIAL ENCOUNTER: ICD-10-CM

## 2018-10-27 DIAGNOSIS — I48.0 PAROXYSMAL ATRIAL FIBRILLATION (HCC): ICD-10-CM

## 2018-10-27 DIAGNOSIS — J44.9 CHRONIC OBSTRUCTIVE PULMONARY DISEASE, UNSPECIFIED COPD TYPE (HCC): ICD-10-CM

## 2018-10-27 DIAGNOSIS — S90.31XA CONTUSION OF RIGHT FOOT, INITIAL ENCOUNTER: ICD-10-CM

## 2018-10-27 PROBLEM — S50.811A: Status: ACTIVE | Noted: 2018-10-27

## 2018-10-27 PROBLEM — J96.20 ACUTE ON CHRONIC RESPIRATORY FAILURE (HCC): Status: ACTIVE | Noted: 2018-10-27

## 2018-10-27 PROBLEM — J93.9 PNEUMOTHORAX: Status: ACTIVE | Noted: 2018-10-27

## 2018-10-27 PROBLEM — E66.01 MORBID OBESITY WITH BMI OF 40.0-44.9, ADULT (HCC): Status: ACTIVE | Noted: 2018-10-27

## 2018-10-27 PROBLEM — N18.30 CKD (CHRONIC KIDNEY DISEASE), STAGE III: Status: ACTIVE | Noted: 2018-07-24

## 2018-10-27 LAB
ALBUMIN SERPL BCP-MCNC: 3.6 G/DL (ref 3.2–4.9)
ALBUMIN/GLOB SERPL: 0.8 G/DL
ALP SERPL-CCNC: 107 U/L (ref 30–99)
ALT SERPL-CCNC: 13 U/L (ref 2–50)
ANION GAP SERPL CALC-SCNC: 8 MMOL/L (ref 0–11.9)
ANISOCYTOSIS BLD QL SMEAR: ABNORMAL
AST SERPL-CCNC: 23 U/L (ref 12–45)
BASO STIPL BLD QL SMEAR: NORMAL
BASOPHILS # BLD AUTO: 0.9 % (ref 0–1.8)
BASOPHILS # BLD: 0.12 K/UL (ref 0–0.12)
BILIRUB SERPL-MCNC: 1.2 MG/DL (ref 0.1–1.5)
BNP SERPL-MCNC: 564 PG/ML (ref 0–100)
BUN SERPL-MCNC: 19 MG/DL (ref 8–22)
CALCIUM SERPL-MCNC: 9 MG/DL (ref 8.5–10.5)
CHLORIDE SERPL-SCNC: 101 MMOL/L (ref 96–112)
CHOLEST SERPL-MCNC: 77 MG/DL (ref 100–199)
CO2 SERPL-SCNC: 23 MMOL/L (ref 20–33)
CREAT SERPL-MCNC: 1.46 MG/DL (ref 0.5–1.4)
DACRYOCYTES BLD QL SMEAR: NORMAL
EOSINOPHIL # BLD AUTO: 0.47 K/UL (ref 0–0.51)
EOSINOPHIL NFR BLD: 3.4 % (ref 0–6.9)
ERYTHROCYTE [DISTWIDTH] IN BLOOD BY AUTOMATED COUNT: 68.5 FL (ref 35.9–50)
FLUAV RNA SPEC QL NAA+PROBE: NEGATIVE
FLUBV RNA SPEC QL NAA+PROBE: NEGATIVE
GLOBULIN SER CALC-MCNC: 4.4 G/DL (ref 1.9–3.5)
GLUCOSE SERPL-MCNC: 122 MG/DL (ref 65–99)
HCT VFR BLD AUTO: 33 % (ref 42–52)
HDLC SERPL-MCNC: 23 MG/DL
HGB BLD-MCNC: 8.4 G/DL (ref 14–18)
HYPOCHROMIA BLD QL SMEAR: ABNORMAL
LACTATE BLD-SCNC: 1.4 MMOL/L (ref 0.5–2)
LACTATE BLD-SCNC: 1.9 MMOL/L (ref 0.5–2)
LDLC SERPL CALC-MCNC: 40 MG/DL
LYMPHOCYTES # BLD AUTO: 0.94 K/UL (ref 1–4.8)
LYMPHOCYTES NFR BLD: 6.8 % (ref 22–41)
MACROCYTES BLD QL SMEAR: ABNORMAL
MANUAL DIFF BLD: NORMAL
MCH RBC QN AUTO: 18.1 PG (ref 27–33)
MCHC RBC AUTO-ENTMCNC: 25.5 G/DL (ref 33.7–35.3)
MCV RBC AUTO: 71.1 FL (ref 81.4–97.8)
MICROCYTES BLD QL SMEAR: ABNORMAL
MONOCYTES # BLD AUTO: 1.3 K/UL (ref 0–0.85)
MONOCYTES NFR BLD AUTO: 9.4 % (ref 0–13.4)
MORPHOLOGY BLD-IMP: NORMAL
NEUTROPHILS # BLD AUTO: 10.97 K/UL (ref 1.82–7.42)
NEUTROPHILS NFR BLD: 76.9 % (ref 44–72)
NEUTS BAND NFR BLD MANUAL: 2.6 % (ref 0–10)
NRBC # BLD AUTO: 0.02 K/UL
NRBC BLD-RTO: 0.1 /100 WBC
OVALOCYTES BLD QL SMEAR: NORMAL
PLATELET # BLD AUTO: 302 K/UL (ref 164–446)
PLATELET BLD QL SMEAR: NORMAL
PMV BLD AUTO: 9.7 FL (ref 9–12.9)
POIKILOCYTOSIS BLD QL SMEAR: NORMAL
POLYCHROMASIA BLD QL SMEAR: NORMAL
POTASSIUM SERPL-SCNC: 4.3 MMOL/L (ref 3.6–5.5)
PROCALCITONIN SERPL-MCNC: 0.15 NG/ML
PROT SERPL-MCNC: 8 G/DL (ref 6–8.2)
RBC # BLD AUTO: 4.64 M/UL (ref 4.7–6.1)
RBC BLD AUTO: PRESENT
SCHISTOCYTES BLD QL SMEAR: NORMAL
SODIUM SERPL-SCNC: 132 MMOL/L (ref 135–145)
TARGETS BLD QL SMEAR: NORMAL
TRIGL SERPL-MCNC: 72 MG/DL (ref 0–149)
TROPONIN I SERPL-MCNC: 0.02 NG/ML (ref 0–0.04)
WBC # BLD AUTO: 13.8 K/UL (ref 4.8–10.8)

## 2018-10-27 PROCEDURE — 73030 X-RAY EXAM OF SHOULDER: CPT | Mod: RT

## 2018-10-27 PROCEDURE — 700111 HCHG RX REV CODE 636 W/ 250 OVERRIDE (IP): Performed by: HOSPITALIST

## 2018-10-27 PROCEDURE — 84145 PROCALCITONIN (PCT): CPT

## 2018-10-27 PROCEDURE — 36415 COLL VENOUS BLD VENIPUNCTURE: CPT

## 2018-10-27 PROCEDURE — 700101 HCHG RX REV CODE 250: Performed by: SURGERY

## 2018-10-27 PROCEDURE — 700101 HCHG RX REV CODE 250: Performed by: HOSPITALIST

## 2018-10-27 PROCEDURE — 80053 COMPREHEN METABOLIC PANEL: CPT

## 2018-10-27 PROCEDURE — 99223 1ST HOSP IP/OBS HIGH 75: CPT | Mod: AI,GC | Performed by: INTERNAL MEDICINE

## 2018-10-27 PROCEDURE — 71045 X-RAY EXAM CHEST 1 VIEW: CPT

## 2018-10-27 PROCEDURE — 96376 TX/PRO/DX INJ SAME DRUG ADON: CPT

## 2018-10-27 PROCEDURE — 73630 X-RAY EXAM OF FOOT: CPT | Mod: RT

## 2018-10-27 PROCEDURE — 87040 BLOOD CULTURE FOR BACTERIA: CPT | Mod: 91

## 2018-10-27 PROCEDURE — 94640 AIRWAY INHALATION TREATMENT: CPT

## 2018-10-27 PROCEDURE — 71101 X-RAY EXAM UNILAT RIBS/CHEST: CPT | Mod: RT

## 2018-10-27 PROCEDURE — 51798 US URINE CAPACITY MEASURE: CPT

## 2018-10-27 PROCEDURE — A9270 NON-COVERED ITEM OR SERVICE: HCPCS | Performed by: STUDENT IN AN ORGANIZED HEALTH CARE EDUCATION/TRAINING PROGRAM

## 2018-10-27 PROCEDURE — 99285 EMERGENCY DEPT VISIT HI MDM: CPT

## 2018-10-27 PROCEDURE — 84484 ASSAY OF TROPONIN QUANT: CPT

## 2018-10-27 PROCEDURE — 85027 COMPLETE CBC AUTOMATED: CPT

## 2018-10-27 PROCEDURE — 96375 TX/PRO/DX INJ NEW DRUG ADDON: CPT

## 2018-10-27 PROCEDURE — 700102 HCHG RX REV CODE 250 W/ 637 OVERRIDE(OP): Performed by: STUDENT IN AN ORGANIZED HEALTH CARE EDUCATION/TRAINING PROGRAM

## 2018-10-27 PROCEDURE — 93005 ELECTROCARDIOGRAM TRACING: CPT | Performed by: EMERGENCY MEDICINE

## 2018-10-27 PROCEDURE — 94760 N-INVAS EAR/PLS OXIMETRY 1: CPT

## 2018-10-27 PROCEDURE — 83605 ASSAY OF LACTIC ACID: CPT

## 2018-10-27 PROCEDURE — 85007 BL SMEAR W/DIFF WBC COUNT: CPT

## 2018-10-27 PROCEDURE — 700101 HCHG RX REV CODE 250

## 2018-10-27 PROCEDURE — 700111 HCHG RX REV CODE 636 W/ 250 OVERRIDE (IP): Performed by: EMERGENCY MEDICINE

## 2018-10-27 PROCEDURE — 96374 THER/PROPH/DIAG INJ IV PUSH: CPT

## 2018-10-27 PROCEDURE — A9270 NON-COVERED ITEM OR SERVICE: HCPCS | Performed by: INTERNAL MEDICINE

## 2018-10-27 PROCEDURE — 700111 HCHG RX REV CODE 636 W/ 250 OVERRIDE (IP): Performed by: STUDENT IN AN ORGANIZED HEALTH CARE EDUCATION/TRAINING PROGRAM

## 2018-10-27 PROCEDURE — 80061 LIPID PANEL: CPT

## 2018-10-27 PROCEDURE — 700102 HCHG RX REV CODE 250 W/ 637 OVERRIDE(OP): Performed by: HOSPITALIST

## 2018-10-27 PROCEDURE — 302255 BARRIER CREAM MOISTURE BAZA PROTECT (ZINC) 5OZ

## 2018-10-27 PROCEDURE — 304561 HCHG STAT O2

## 2018-10-27 PROCEDURE — 700102 HCHG RX REV CODE 250 W/ 637 OVERRIDE(OP): Performed by: INTERNAL MEDICINE

## 2018-10-27 PROCEDURE — 770020 HCHG ROOM/CARE - TELE (206)

## 2018-10-27 PROCEDURE — A9270 NON-COVERED ITEM OR SERVICE: HCPCS | Performed by: HOSPITALIST

## 2018-10-27 PROCEDURE — 83880 ASSAY OF NATRIURETIC PEPTIDE: CPT

## 2018-10-27 PROCEDURE — 87502 INFLUENZA DNA AMP PROBE: CPT

## 2018-10-27 PROCEDURE — 73620 X-RAY EXAM OF FOOT: CPT | Mod: RT

## 2018-10-27 PROCEDURE — 73070 X-RAY EXAM OF ELBOW: CPT | Mod: RT

## 2018-10-27 RX ORDER — METOPROLOL SUCCINATE 25 MG/1
25 TABLET, EXTENDED RELEASE ORAL DAILY
Status: DISCONTINUED | OUTPATIENT
Start: 2018-10-27 | End: 2018-10-31 | Stop reason: HOSPADM

## 2018-10-27 RX ORDER — LABETALOL HYDROCHLORIDE 5 MG/ML
10 INJECTION, SOLUTION INTRAVENOUS EVERY 4 HOURS PRN
Status: DISCONTINUED | OUTPATIENT
Start: 2018-10-27 | End: 2018-10-31 | Stop reason: HOSPADM

## 2018-10-27 RX ORDER — FUROSEMIDE 10 MG/ML
20 INJECTION INTRAMUSCULAR; INTRAVENOUS
Status: DISCONTINUED | OUTPATIENT
Start: 2018-10-27 | End: 2018-10-28

## 2018-10-27 RX ORDER — MORPHINE SULFATE 4 MG/ML
1 INJECTION, SOLUTION INTRAMUSCULAR; INTRAVENOUS
Status: DISCONTINUED | OUTPATIENT
Start: 2018-10-27 | End: 2018-10-27

## 2018-10-27 RX ORDER — NICOTINE 21 MG/24HR
14 PATCH, TRANSDERMAL 24 HOURS TRANSDERMAL
Status: DISCONTINUED | OUTPATIENT
Start: 2018-10-27 | End: 2018-10-31 | Stop reason: HOSPADM

## 2018-10-27 RX ORDER — LIDOCAINE 50 MG/G
OINTMENT TOPICAL
Status: DISCONTINUED | OUTPATIENT
Start: 2018-10-27 | End: 2018-10-27

## 2018-10-27 RX ORDER — MAGNESIUM SULFATE HEPTAHYDRATE 40 MG/ML
2 INJECTION, SOLUTION INTRAVENOUS ONCE
Status: COMPLETED | OUTPATIENT
Start: 2018-10-27 | End: 2018-10-27

## 2018-10-27 RX ORDER — BISACODYL 10 MG
10 SUPPOSITORY, RECTAL RECTAL
Status: DISCONTINUED | OUTPATIENT
Start: 2018-10-27 | End: 2018-10-31 | Stop reason: HOSPADM

## 2018-10-27 RX ORDER — FERROUS SULFATE 325(65) MG
325 TABLET ORAL
Status: DISCONTINUED | OUTPATIENT
Start: 2018-10-28 | End: 2018-10-31 | Stop reason: HOSPADM

## 2018-10-27 RX ORDER — TRAMADOL HYDROCHLORIDE 50 MG/1
100 TABLET ORAL EVERY 6 HOURS
Status: DISCONTINUED | OUTPATIENT
Start: 2018-10-27 | End: 2018-10-29

## 2018-10-27 RX ORDER — POLYETHYLENE GLYCOL 3350 17 G/17G
1 POWDER, FOR SOLUTION ORAL
Status: DISCONTINUED | OUTPATIENT
Start: 2018-10-27 | End: 2018-10-31 | Stop reason: HOSPADM

## 2018-10-27 RX ORDER — DOXYCYCLINE 100 MG/1
100 TABLET ORAL EVERY 12 HOURS
Status: DISCONTINUED | OUTPATIENT
Start: 2018-10-27 | End: 2018-10-30

## 2018-10-27 RX ORDER — LIDOCAINE 50 MG/G
2 PATCH TOPICAL EVERY 24 HOURS
Status: DISCONTINUED | OUTPATIENT
Start: 2018-10-27 | End: 2018-10-31 | Stop reason: HOSPADM

## 2018-10-27 RX ORDER — SPIRONOLACTONE 25 MG/1
25 TABLET ORAL DAILY
Status: DISCONTINUED | OUTPATIENT
Start: 2018-10-27 | End: 2018-10-31 | Stop reason: HOSPADM

## 2018-10-27 RX ORDER — AMIODARONE HYDROCHLORIDE 200 MG/1
200 TABLET ORAL DAILY
Status: DISCONTINUED | OUTPATIENT
Start: 2018-10-27 | End: 2018-10-31 | Stop reason: HOSPADM

## 2018-10-27 RX ORDER — GUAIFENESIN/DEXTROMETHORPHAN 100-10MG/5
10 SYRUP ORAL EVERY 6 HOURS PRN
Status: DISCONTINUED | OUTPATIENT
Start: 2018-10-27 | End: 2018-10-31 | Stop reason: HOSPADM

## 2018-10-27 RX ORDER — IPRATROPIUM BROMIDE AND ALBUTEROL SULFATE 2.5; .5 MG/3ML; MG/3ML
SOLUTION RESPIRATORY (INHALATION)
Status: COMPLETED
Start: 2018-10-27 | End: 2018-10-27

## 2018-10-27 RX ORDER — AMOXICILLIN 250 MG
2 CAPSULE ORAL 2 TIMES DAILY
Status: DISCONTINUED | OUTPATIENT
Start: 2018-10-27 | End: 2018-10-31 | Stop reason: HOSPADM

## 2018-10-27 RX ORDER — TRAMADOL HYDROCHLORIDE 50 MG/1
50 TABLET ORAL EVERY 6 HOURS PRN
Status: DISCONTINUED | OUTPATIENT
Start: 2018-10-27 | End: 2018-10-27

## 2018-10-27 RX ORDER — BUDESONIDE AND FORMOTEROL FUMARATE DIHYDRATE 160; 4.5 UG/1; UG/1
2 AEROSOL RESPIRATORY (INHALATION) 2 TIMES DAILY
COMMUNITY
End: 2019-01-04

## 2018-10-27 RX ORDER — PREDNISONE 50 MG/1
50 TABLET ORAL DAILY
Status: DISCONTINUED | OUTPATIENT
Start: 2018-10-27 | End: 2018-10-28

## 2018-10-27 RX ORDER — MORPHINE SULFATE 4 MG/ML
2 INJECTION, SOLUTION INTRAMUSCULAR; INTRAVENOUS
Status: DISCONTINUED | OUTPATIENT
Start: 2018-10-27 | End: 2018-10-31 | Stop reason: HOSPADM

## 2018-10-27 RX ORDER — ROSUVASTATIN CALCIUM 20 MG/1
40 TABLET, COATED ORAL DAILY
Status: DISCONTINUED | OUTPATIENT
Start: 2018-10-27 | End: 2018-10-31 | Stop reason: HOSPADM

## 2018-10-27 RX ORDER — IPRATROPIUM BROMIDE AND ALBUTEROL SULFATE 2.5; .5 MG/3ML; MG/3ML
3 SOLUTION RESPIRATORY (INHALATION)
Status: DISCONTINUED | OUTPATIENT
Start: 2018-10-27 | End: 2018-10-29

## 2018-10-27 RX ORDER — MORPHINE SULFATE 4 MG/ML
4 INJECTION, SOLUTION INTRAMUSCULAR; INTRAVENOUS ONCE
Status: COMPLETED | OUTPATIENT
Start: 2018-10-27 | End: 2018-10-27

## 2018-10-27 RX ORDER — ACETAMINOPHEN 500 MG
1000 TABLET ORAL EVERY 8 HOURS
Status: DISCONTINUED | OUTPATIENT
Start: 2018-10-27 | End: 2018-10-31 | Stop reason: HOSPADM

## 2018-10-27 RX ADMIN — IPRATROPIUM BROMIDE AND ALBUTEROL SULFATE 3 ML: .5; 3 SOLUTION RESPIRATORY (INHALATION) at 10:56

## 2018-10-27 RX ADMIN — DOXYCYCLINE 100 MG: 100 TABLET ORAL at 11:58

## 2018-10-27 RX ADMIN — TRAMADOL HYDROCHLORIDE 100 MG: 50 TABLET, FILM COATED ORAL at 22:34

## 2018-10-27 RX ADMIN — DOXYCYCLINE 100 MG: 100 TABLET ORAL at 21:40

## 2018-10-27 RX ADMIN — IPRATROPIUM BROMIDE AND ALBUTEROL SULFATE 6 ML: .5; 3 SOLUTION RESPIRATORY (INHALATION) at 05:05

## 2018-10-27 RX ADMIN — METOPROLOL SUCCINATE 25 MG: 25 TABLET, EXTENDED RELEASE ORAL at 11:57

## 2018-10-27 RX ADMIN — IPRATROPIUM BROMIDE AND ALBUTEROL SULFATE 3 ML: .5; 3 SOLUTION RESPIRATORY (INHALATION) at 22:27

## 2018-10-27 RX ADMIN — MORPHINE SULFATE 2 MG: 4 INJECTION INTRAVENOUS at 21:46

## 2018-10-27 RX ADMIN — AMIODARONE HYDROCHLORIDE 200 MG: 200 TABLET ORAL at 11:57

## 2018-10-27 RX ADMIN — ACETAMINOPHEN 1000 MG: 500 TABLET, FILM COATED ORAL at 11:58

## 2018-10-27 RX ADMIN — LIDOCAINE 2 PATCH: 50 PATCH TOPICAL at 16:10

## 2018-10-27 RX ADMIN — TRAMADOL HYDROCHLORIDE 100 MG: 50 TABLET, FILM COATED ORAL at 16:49

## 2018-10-27 RX ADMIN — ACETAMINOPHEN 1000 MG: 500 TABLET, FILM COATED ORAL at 21:40

## 2018-10-27 RX ADMIN — FUROSEMIDE 20 MG: 10 INJECTION, SOLUTION INTRAMUSCULAR; INTRAVENOUS at 16:48

## 2018-10-27 RX ADMIN — PREDNISONE 50 MG: 50 TABLET ORAL at 11:56

## 2018-10-27 RX ADMIN — MORPHINE SULFATE 2 MG: 4 INJECTION INTRAVENOUS at 10:57

## 2018-10-27 RX ADMIN — SPIRONOLACTONE 25 MG: 25 TABLET ORAL at 11:57

## 2018-10-27 RX ADMIN — ROSUVASTATIN CALCIUM 40 MG: 20 TABLET, FILM COATED ORAL at 11:56

## 2018-10-27 RX ADMIN — GUAIFENESIN AND DEXTROMETHORPHAN 10 ML: 100; 10 SYRUP ORAL at 21:51

## 2018-10-27 RX ADMIN — FUROSEMIDE 20 MG: 10 INJECTION, SOLUTION INTRAMUSCULAR; INTRAVENOUS at 11:56

## 2018-10-27 RX ADMIN — MORPHINE SULFATE 4 MG: 4 INJECTION INTRAVENOUS at 07:27

## 2018-10-27 RX ADMIN — MAGNESIUM SULFATE HEPTAHYDRATE 2 G: 40 INJECTION, SOLUTION INTRAVENOUS at 05:09

## 2018-10-27 RX ADMIN — IPRATROPIUM BROMIDE AND ALBUTEROL SULFATE 3 ML: .5; 3 SOLUTION RESPIRATORY (INHALATION) at 15:14

## 2018-10-27 RX ADMIN — IPRATROPIUM BROMIDE AND ALBUTEROL SULFATE 3 ML: .5; 3 SOLUTION RESPIRATORY (INHALATION) at 18:31

## 2018-10-27 ASSESSMENT — COGNITIVE AND FUNCTIONAL STATUS - GENERAL
MOBILITY SCORE: 19
SUGGESTED CMS G CODE MODIFIER DAILY ACTIVITY: CK
CLIMB 3 TO 5 STEPS WITH RAILING: A LOT
STANDING UP FROM CHAIR USING ARMS: A LITTLE
TOILETING: A LITTLE
WALKING IN HOSPITAL ROOM: A LOT
SUGGESTED CMS G CODE MODIFIER MOBILITY: CK
HELP NEEDED FOR BATHING: A LITTLE
DRESSING REGULAR LOWER BODY CLOTHING: A LOT
DRESSING REGULAR UPPER BODY CLOTHING: A LITTLE
DAILY ACTIVITIY SCORE: 19

## 2018-10-27 ASSESSMENT — ENCOUNTER SYMPTOMS
NECK PAIN: 0
PND: 1
MYALGIAS: 0
WHEEZING: 1
WEIGHT LOSS: 0
HEARTBURN: 0
SHORTNESS OF BREATH: 1
DIZZINESS: 0
SPUTUM PRODUCTION: 1
HEADACHES: 0
CHILLS: 1
FEVER: 0
VOMITING: 0
NAUSEA: 0
TINGLING: 0
FALLS: 1
CHILLS: 0
ABDOMINAL PAIN: 0
HEMOPTYSIS: 0
ORTHOPNEA: 1
BRUISES/BLEEDS EASILY: 0
DEPRESSION: 0
COUGH: 1
DIARRHEA: 0

## 2018-10-27 ASSESSMENT — LIFESTYLE VARIABLES
EVER_SMOKED: YES
DO YOU DRINK ALCOHOL: NO
EVER_SMOKED: YES

## 2018-10-27 ASSESSMENT — COPD QUESTIONNAIRES
DO YOU EVER COUGH UP ANY MUCUS OR PHLEGM?: YES, A FEW DAYS A WEEK OR MONTH
DURING THE PAST 4 WEEKS HOW MUCH DID YOU FEEL SHORT OF BREATH: SOME OF THE TIME
COPD SCREENING SCORE: 5
HAVE YOU SMOKED AT LEAST 100 CIGARETTES IN YOUR ENTIRE LIFE: NO/DON'T KNOW

## 2018-10-27 ASSESSMENT — PAIN SCALES - GENERAL
PAINLEVEL_OUTOF10: 7
PAINLEVEL_OUTOF10: 7
PAINLEVEL_OUTOF10: 6
PAINLEVEL_OUTOF10: 7
PAINLEVEL_OUTOF10: 3
PAINLEVEL_OUTOF10: 9

## 2018-10-27 NOTE — ED PROVIDER NOTES
"ED Provider Note    ED Provider Note    Primary care provider: Nam Le M.D.  Means of arrival: POV  History obtained from: PAtient, family member  History limited by: None    CHIEF COMPLAINT  Chief Complaint   Patient presents with   • Shortness of Breath     started 2130 yesterday, ran out of albuterol inhalers   • GLF     EMS gurney broke when transporting for SOB, fell 4 ft to ground and injured right arm and ribs and shoulder   • Rib Pain     right side       HPI  Joshua Medrano is a 52 y.o. male who presents to the Emergency Department via EMS with a chief complaint of shortness of breath.  Patient states he \"caught a cold\" and began using his nebulizer but then ran out of the medication.  He was still short of breath, prompting his call to 911.  Unfortunately, during transport, EMS is gurney failed and the patient fell off the gurney onto the floor and her on his right side primarily.  He complains of pain to his right shoulder, right chest wall and right foot.  He sustained an abrasion to his right forearm during this fall.  He denies any recent fever.  No chest pain, prior to the fall.  No vomiting or diarrhea.    REVIEW OF SYSTEMS  Review of Systems   Constitutional: Negative for chills and fever.   HENT: Positive for congestion.    Respiratory: Positive for cough, sputum production, shortness of breath and wheezing.    Cardiovascular: Positive for chest pain and leg swelling.   Gastrointestinal: Negative for abdominal pain, nausea and vomiting.   Musculoskeletal: Positive for falls.   Neurological: Negative for headaches.   All other systems reviewed and are negative.      PAST MEDICAL HISTORY   has a past medical history of ASTHMA; Atrial fibrillation (HCC); CAD (coronary artery disease); Chronic obstructive pulmonary disease (HCC); Congestive heart failure (HCC); and Diabetes.    SURGICAL HISTORY   has a past surgical history that includes other abdominal surgery; multiple coronary artery bypass " endo vein harvest (12/22/2017); oscar (12/22/2017); thoracoscopy (Left, 1/25/2018); and colonoscopy - endo (N/A, 7/25/2018).    SOCIAL HISTORY  Social History   Substance Use Topics   • Smoking status: Current Some Day Smoker     Packs/day: 0.00     Years: 30.00     Types: Cigarettes   • Smokeless tobacco: Never Used      Comment: 1/2-1.5 packs for 30 years   • Alcohol use No      History   Drug Use No       FAMILY HISTORY  Family History   Problem Relation Age of Onset   • Diabetes Mother    • Stroke Mother    • Leukemia Mother    • Diabetes Father    • No Known Problems Sister    • Heart Disease Brother         PPM   • Lung Disease Brother    • Alcohol/Drug Brother    • Diabetes Sister        CURRENT MEDICATIONS  Home Medications     Reviewed by Adriana Chow, Pharmacy Intern (Pharmacy Intern) on 10/27/18 at 0808  Med List Status: Complete   Medication Last Dose Status   albuterol 108 (90 Base) MCG/ACT Aero Soln inhalation aerosol 10/26/2018 Active   amiodarone (CORDARONE) 200 MG Tab 10/26/2018 Active   budesonide-formoterol (SYMBICORT) 160-4.5 MCG/ACT Aerosol > 4 days Active   furosemide (LASIX) 20 MG Tab 10/26/2018 Active   ipratropium-albuterol (DUONEB) 0.5-2.5 (3) MG/3ML nebulizer solution OUT Active   lisinopril (PRINIVIL) 2.5 MG Tab 10/26/2018 Active   metoprolol SR (TOPROL XL) 25 MG TABLET SR 24 HR 10/26/2018 Active   rosuvastatin (CRESTOR) 40 MG tablet 10/26/2018 Active   spironolactone (ALDACTONE) 25 MG Tab 10/26/2018 Active                ALLERGIES  Allergies   Allergen Reactions   • Cillins [Penicillins] Anaphylaxis and Rash     As a child, tolerated cefepime (12/2017), ceftriaxone (1/2018), cefazolin (12/2017)   • Erythromycin Anaphylaxis     Rxn - 1994   • Metoprolol      Pt stated got latham and aggressive, in demi dose's per wife.      • Shellfish Allergy Rash     Pt stated that he is allergic to all seafood, will develop a rash and says that rash will clear up with benadryl       PHYSICAL  "EXAM  VITAL SIGNS: /52   Pulse 75   Temp 36.8 °C (98.2 °F)   Resp 20   Ht 1.956 m (6' 5\")   Wt (!) 158.8 kg (350 lb 1.5 oz)   SpO2 92%   BMI 41.51 kg/m²   Vitals reviewed.    Constitutional: Patient is oriented to person, place, and time.  Chronically ill-appearing.  Mild distress.    Head: Normocephalic and atraumatic.   Ears: Normal external ears bilaterally.   Mouth/Throat: Oropharynx is clear, dry mucous membranes   Eyes: Conjunctivae are normal. Pupils are equal, round, and reactive to light.   Neck: Normal range of motion. Neck supple.  Cardiovascular: Normal rate, regular rhythm and normal heart sounds. Normal peripheral pulses.  Pulmonary/Chest: Mild increased work of breathing.  Bilateral wheezes. No respiratory distress,  rhonchi, or rales.  Right chest wall tenderness.  Abdominal: Soft. Bowel sounds are normal. There is no tenderness. No rebound or guarding, or peritoneal signs.   Musculoskeletal: No deformities noted.  Pain diffusely over the right shoulder.Bilateral chronic appearing lower extremity edema.   Lymphadenopathy: No cervical adenopathy.   Neurological: No focal deficits.   Skin: Skin is warm and dry. No erythema. No pallor.  Abrasion to the right forearm.Chronic bilateral venous stasis hyperpigmentation and dermatitis.    Psychiatric: Patient has a normal mood and affect.     LABS  Results for orders placed or performed during the hospital encounter of 10/27/18   CBC w/ Differential   Result Value Ref Range    WBC 13.8 (H) 4.8 - 10.8 K/uL    RBC 4.64 (L) 4.70 - 6.10 M/uL    Hemoglobin 8.4 (L) 14.0 - 18.0 g/dL    Hematocrit 33.0 (L) 42.0 - 52.0 %    MCV 71.1 (L) 81.4 - 97.8 fL    MCH 18.1 (L) 27.0 - 33.0 pg    MCHC 25.5 (L) 33.7 - 35.3 g/dL    RDW 68.5 (H) 35.9 - 50.0 fL    Platelet Count 302 164 - 446 K/uL    MPV 9.7 9.0 - 12.9 fL    Neutrophils-Polys 76.90 (H) 44.00 - 72.00 %    Lymphocytes 6.80 (L) 22.00 - 41.00 %    Monocytes 9.40 0.00 - 13.40 %    Eosinophils 3.40 0.00 - " 6.90 %    Basophils 0.90 0.00 - 1.80 %    Nucleated RBC 0.10 /100 WBC    Neutrophils (Absolute) 10.97 (H) 1.82 - 7.42 K/uL    Lymphs (Absolute) 0.94 (L) 1.00 - 4.80 K/uL    Monos (Absolute) 1.30 (H) 0.00 - 0.85 K/uL    Eos (Absolute) 0.47 0.00 - 0.51 K/uL    Baso (Absolute) 0.12 0.00 - 0.12 K/uL    NRBC (Absolute) 0.02 K/uL    Hypochromia 2+     Anisocytosis 2+     Macrocytosis 1+     Microcytosis 2+    Complete Metabolic Panel (CMP)   Result Value Ref Range    Sodium 132 (L) 135 - 145 mmol/L    Potassium 4.3 3.6 - 5.5 mmol/L    Chloride 101 96 - 112 mmol/L    Co2 23 20 - 33 mmol/L    Anion Gap 8.0 0.0 - 11.9    Glucose 122 (H) 65 - 99 mg/dL    Bun 19 8 - 22 mg/dL    Creatinine 1.46 (H) 0.50 - 1.40 mg/dL    Calcium 9.0 8.5 - 10.5 mg/dL    AST(SGOT) 23 12 - 45 U/L    ALT(SGPT) 13 2 - 50 U/L    Alkaline Phosphatase 107 (H) 30 - 99 U/L    Total Bilirubin 1.2 0.1 - 1.5 mg/dL    Albumin 3.6 3.2 - 4.9 g/dL    Total Protein 8.0 6.0 - 8.2 g/dL    Globulin 4.4 (H) 1.9 - 3.5 g/dL    A-G Ratio 0.8 g/dL   Btype Natriuretic Peptide   Result Value Ref Range    B Natriuretic Peptide 564 (H) 0 - 100 pg/mL   Troponin STAT   Result Value Ref Range    Troponin I 0.02 0.00 - 0.04 ng/mL   LACTIC ACID   Result Value Ref Range    Lactic Acid 1.9 0.5 - 2.0 mmol/L   DIFFERENTIAL MANUAL   Result Value Ref Range    Bands-Stabs 2.60 0.00 - 10.00 %    Manual Diff Status PERFORMED    PERIPHERAL SMEAR REVIEW   Result Value Ref Range    Peripheral Smear Review see below    PLATELET ESTIMATE   Result Value Ref Range    Plt Estimation Normal    MORPHOLOGY   Result Value Ref Range    RBC Morphology Present     Polychromia 1+     Poikilocytosis 1+     Ovalocytes 2+     Schistocytes 2+     Target Cells 2+     Tear Drop Cells 1+     Basophilic Stippling Few    ESTIMATED GFR   Result Value Ref Range    GFR If African American >60 >60 mL/min/1.73 m 2    GFR If Non African American 50 (A) >60 mL/min/1.73 m 2   INFLUENZA A/B BY PCR   Result Value Ref Range     Influenza virus A RNA Negative Negative    Influenza virus B, PCR Negative Negative   PROCALCITONIN   Result Value Ref Range    Procalcitonin 0.15 <0.25 ng/mL   EKG   Result Value Ref Range    Report       [facility] Emergency Dept.    Test Date:  2018-10-27  Pt Name:    JARRETT WHITE                   Department: ER  MRN:        [id number]                      Room:        15  Gender:     Male                         Technician: [id number]  :        1965                   Requested By:TAYLOR NICOLE  Order #:    [id number]                    Reading MD:    Measurements  Intervals                                Axis  Rate:       70                           P:          0  ID:         [id number]                          QRS:        -54  QRSD:       112                          T:          90  QT:         428  QTc:        462    Interpretive Statements  SINUS TACHYCARDIA  PAIRED VENTRICULAR PREMATURE COMPLEXES  PROBABLE LEFT ATRIAL ABNORMALITY  LEFT ANTERIOR FASCICULAR BLOCK  BORDERLINE R WAVE PROGRESSION, ANTERIOR LEADS  Compared to ECG 2018 19:46:13  Ventricular premature complex(es) now present  Left anterior fascicular block now present  Sinus rhythm no longer present   First degree AV block no longer present  Left-axis deviation no longer present  T-wave abnormality no longer present         All labs reviewed by me.    EKG Interpretation  Interpreted by me    Rhythm: Artifact.  Sinus rhythm  Rate: 70   Axis: normal  Ectopy: none  Conduction: normal  ST Segments: no acute change  T Waves: no acute change  Q Waves: none    Clinical Impression: Comparison made to prior EKG from September of this year.  No acute morphology changes.  His sinus rhythm with significant artifact.      RADIOLOGY  DX-CHEST-PORTABLE (1 VIEW)   Final Result         1. Small right apical pneumothorax.   2. Cardiomegaly with worsening interstitial and alveolar edema.      Case was discussed with Taylor Nicole at approximately  9:40 AM on 10/27/2018.      Case was discussed with      DX-FOOT-2- RIGHT   Final Result      No acute bony abnormality.      DX-SHOULDER 2+ RIGHT   Final Result      Right seventh rib fracture.      [id number] (WITH 1-VIEW CXR) RIGHT   Final Result      Right-sided rib fractures as described above.      DX-ELBOW-LIMITED 2- RIGHT    (Results Pending)   DX-CHEST-PORTABLE (1 VIEW)    (Results Pending)     The radiologist's interpretation of all radiological studies have been reviewed by me.    COURSE & MEDICAL DECISION MAKING  Pertinent Labs & Imaging studies reviewed. (See chart for details)    Obtained and reviewed past medical records.  Patient's most recent admission was just earlier this month for a ground-level fall and diarrhea.  He was also admitted in August of this year COPD exacerbation, CHF and lower abdominal wall cellulitis.  He has had 2 ED encounters in the interim.    4:55 AM - Patient seen and examined at bedside.  This is a chronically ill-appearing, 52-year-old obese male who presents with respiratory symptoms.  Unfortunately, he suffered an accident falling off ([facility] now complaining of chest wall pain and shoulder pain.  We will get x-rays of these areas to assess for possible fracture.  Otherwise the differential diagnosis includes COPD or CHF exacerbation, pneumonia or bronchitis.     7:10 AM, patient's reevaluated the bedside.  We discussed x-ray findings which include evidence of multiple rib fractures on the right.  Labs show an elevated creatinine of 1.46 this is essentially unchanged from earlier this month.  He does have a slightly elevated white blood cell count of 13.8, this is actually improved from previous values.  He is also anemic again, this is improved from previous values.  Patient still experiencing a significant amount of pain related to his multiple right-sided rib fractures.  I do think he will need admission to the hospital to manage his pain.  Repeat lung exam is  improved.  Given his already, compromised lung function, he certainly could decompensate.  He is improved, at this time as far as his presenting symptoms of wheezing and shortness of breath.    7:32 AM R- service paged.    7:58 AM, discussed with [facility] internal medicine resident who agrees to admit the patient to their service.    9:39 AM radiologist called with report of a small pneumothorax and worsening pleural effusion, seen on chest x-ray.    11:09 AM trauma PA, in the department.  I discussed the patient's case with him.  The they agreed to see the patient in consultation.    Patient is in stable but guarded condition at this time.    FINAL IMPRESSION  1. Closed fracture of multiple ribs of right side, initial encounter    2. Acute exacerbation of chronic obstructive pulmonary disease (COPD) (HCC)    3. Contusion of right chest wall, initial encounter    4. Strain of right shoulder, initial encounter    5. Contusion of right foot, initial encounter

## 2018-10-27 NOTE — ASSESSMENT & PLAN NOTE
During transport to ED patient unfortunately fell along with the gurney causing right forearm abrasions.     -Edema improving, no fractures  Wound care  Pain control with tramadol   As needed morphine 2 mg

## 2018-10-27 NOTE — ASSESSMENT & PLAN NOTE
Pt is is current smoker of 1-2 cigarrets/day with 30 pack smoking history  Pt reports he will stop smoking after this hospital stay  Will discuss with pt at time of discharge    Nicotine patch

## 2018-10-27 NOTE — ASSESSMENT & PLAN NOTE
Likely biventricular, HFpEF with cor pulmonale. BNP is trending down, patient is feeling much better.  Lung sounds clearer.  Able to pull 2000 on incentive spirometry.    Plan:  Continue Lasix 40 IV bid  Monitor kidney function

## 2018-10-27 NOTE — ED TRIAGE NOTES
Joshua Medrano  52 y.o.  Chief Complaint   Patient presents with   • Shortness of Breath     started 2130 yesterday, ran out of albuterol inhalers   • GLF     EMS ruslan broke when transporting for SOB, fell 4 ft to ground and injured right arm and ribs and shoulder   • Rib Pain     right side   Pt BIB EMS from home. Pt has a c/o SOB and rib/arm/shoulder/foot pain on the right that started tonight.  Patient fell off gurney and landed on right side and gurney landed on foot.  Patient has hx CHF and COPD, on O2 2L at all times.  Patient legs very swollen and in visible respiratory distress  PTA pt received part of a breathing tx, 5mg morphine, 4mg zofran, PIV placed.    Pt rib pain 9/10 at this time.    Changed into gown.  Chart up for ERP.

## 2018-10-27 NOTE — PROGRESS NOTES
Pt arrived to unit via gurney at 1130. Pt oriented to room, unit, and plan of care. Tele-monitor placed and monitor room notified. All questions answered at this time. Call light within reach; fall precautions in place.

## 2018-10-27 NOTE — ASSESSMENT & PLAN NOTE
10/27 CXR imaging with possible small right apical pneumothorax   10/28 CXR with grossly stable chest appearance compared with 10/27.  10/19 CXR with no pneumothorax / no pleural effusion   Trend CXR imaging

## 2018-10-27 NOTE — ASSESSMENT & PLAN NOTE
Pt is a current smoker with 30 Pack smoking hx with home O2 on 2L NC  Pt presented with SOB exacerbation secondary to possible infection vs CHF  Procal WNL  Elevated WBC, however this is chronic for pt. Medrol contributing to elevated WBC    Plan  Resp therapy per protocal  Symbicort  Duoneb   Medrol switched to prednisone  Cefdinir

## 2018-10-27 NOTE — NON-PROVIDER
Internal Medicine Medical Student Admitting History and Physical  Note Author: Eugenio Valdes, Student    Name Joshua Medrano     1965   Age/Sex 52 y.o. male   MRN [id number]   Code Status Full     Chief Complaint: Shortness of breath, ground level fall    HPI: Joshua Medrano is a 52 y.o. male with a complex history including COPD, CHF, CAD s/p CABG, paroxysmal atrial fibrillation, and morbid obesity who presents for shortness of breath. Two days ago, Mr. Medrano noticed a cough productive of yellow-green sputum, and reports that he was around his wife's grandchild who had a cold. Mr. Medrano then became short of breath while sitting yesterday; he is on 2L of O2 at all times. Increasing his oxygen to 3L and one nebulizer treatment with albuterol only provided mild relief. Mr. Medrano reports that he has chronic orthopnea and paroxysmal nocturnal dyspnea; he denies any change in these from baseline. No fever, chest pain, hemoptysis, nausea, or vomiting.     EMS was called to bring Mr. Medrano to the hospital. During transport, the patient suffered a ground level fall, landing on his right side and the gurney landed on his right foot. The patient reports 9/10 rib, arm, shoulder, and foot pain associated with the fall.     Mr. Medrano [facility] is notable for multiple admissions in the past year, including:  · -17 - Pneumonia; subsequent angiogram showed significant CAD; left AMA  · 17-1/10/18 - Diuresis and CABGx3; complicated by pneumonia and pleural effusion drainage  · -18 - Recurrence of pleural effusion warranting pleurodesis  · -18 - CHF exacerbation  · 3/23-3/24/18 - COPD exacerbation 2/2 respiratory infection  · -18 - GI bleed requiring ICU care, pressors, and transfusion of 5 units of PRBCs; Colonoscopy and EGD revealed no source of the bleeding; spontaneous resolution  · -18 - Abdominal cellulitis; COPD and CHF exacerbations  · 10/7-10/10/18 - Diarrhea,  iron-deficiency anemia    Review of Systems   Respiratory: Positive for cough, sputum production and shortness of breath. Negative for hemoptysis.    Cardiovascular: Positive for orthopnea, leg swelling and PND. Negative for chest pain.   Gastrointestinal: Negative for diarrhea, nausea and vomiting.   All other systems reviewed and are negative.    Home Medications:   Prior to Admission medications    Medication Sig Start Date End Date Taking? Authorizing Provider   budesonide-formoterol (SYMBICORT) 160-4.5 MCG/ACT Aerosol Inhale 2 Puffs by mouth 2 Times a Day.   Yes Physician Outpatient   furosemide (LASIX) 20 MG Tab Take 20 mg by mouth every day.    Physician Outpatient   rosuvastatin (CRESTOR) 40 MG tablet Take 40 mg by mouth every day.    Physician Outpatient   lisinopril (PRINIVIL) 2.5 MG Tab Take 1 Tab by mouth every day. 8/23/18   Lucio Roque M.D.   metoprolol SR (TOPROL XL) 25 MG TABLET SR 24 HR Take 1 Tab by mouth every day. 8/23/18   Lucio Roque M.D.   spironolactone (ALDACTONE) 25 MG Tab Take 1 Tab by mouth every day. 8/23/18   Lucio Roque M.D.   ipratropium-albuterol (DUONEB) 0.5-2.5 (3) MG/3ML nebulizer solution 3 mL by Nebulization route every 6 hours as needed for Shortness of Breath. 4/19/18   Nam Le M.D.   amiodarone (CORDARONE) 200 MG Tab Take 1 Tab by mouth every day. 3/1/18   Jett Bedoya M.D.   albuterol 108 (90 Base) MCG/ACT Aero Soln inhalation aerosol Inhale 2 Puffs by mouth every 6 hours as needed for Shortness of Breath. 2/12/18   Nam Le M.D.       Past Medical History:    Problem List:  2018-10: Diarrhea  2018-10: Microcytic anemia  2018-08: Cellulitis of abdominal wall  2018-08: GI bleed  2018-07: Hemorrhagic shock   2018-07: CKD (chronic kidney disease), stage II  2018-07: Dyslipidemia  2018-07: SHASHANK (obstructive sleep apnea)  2018-03: Lower respiratory infection (e.g., bronchitis, pneumonia,   pneumonitis, pulmonitis)  2018-02: Asthma  2018-02:  Essential hypertension  2018-02: Noncompliance with medication regimen  2018-02: Aspiration into airway  2018-02: Normocytic anemia  2018-02: Paroxysmal atrial fibrillation   2018-02: Congestive heart failure   2018-02: Acute hypoxemic respiratory failure   2018-02: Tracheostomy care   2018-01: Pleural effusion on left  2018-01: Atrial flutter with rapid ventricular response   2018-01: Acute respiratory failure with hypoxia   2017-12: CAD (coronary artery disease)  2017-11: Chronic systolic CHF (congestive heart failure), NYHA class   4   2017-11: Type 2 diabetes mellitus with complication, without long-  term current use of insulin   2017-11: History of stroke  2017-11: NSTEMI (non-ST elevated myocardial infarction)   2017-11: COPD (chronic obstructive pulmonary disease)   2017-11: Pneumonia  2017-11: Cardiomyopathy   2017-11: Acute pulmonary edema   2017-04: UTI (urinary tract infection)  2017-04: Pyelonephritis    Past Surgical History:  Past Surgical History:   Procedure Laterality Date   • COLONOSCOPY - ENDO N/A 7/25/2018    Procedure: COLONOSCOPY - ENDO;  Surgeon: Josiah Molina D.O.;  Location: ENDOSCOPY [facility];  Service: Gastroenterology   • THORACOSCOPY Left 1/25/2018    Procedure: THORACOSCOPY- PLEURODESIS ;  Surgeon: Chandu Paez M.D.;  Location: SURGERY [facility];  Service: General   • MULTIPLE CORONARY ARTERY BYPASS ENDO VEIN HARVEST  12/22/2017    Procedure: MULTIPLE CORONARY ARTERY BYPASS ENDO VEIN HARVEST X2;  Surgeon: Kiel Ash M.D.;  Location: SURGERY [facility];  Service: Cardiothoracic   • JIM  12/22/2017    Procedure: JIM;  Surgeon: Kiel Ash M.D.;  Location: SURGERY [facility];  Service: Cardiothoracic   • OTHER ABDOMINAL SURGERY         Medication Allergy/Sensitivities:  Cillins [penicillins]; Erythromycin; Metoprolol; and Shellfish allergy    Family History:  Family History   Problem Relation Age of Onset   • Diabetes Mother    • Stroke Mother    •  "Leukemia Mother    • Diabetes Father    • No Known Problems Sister    • Heart Disease Brother         PPM   • Lung Disease Brother    • Alcohol/Drug Brother    • Diabetes Sister        Social History:  Social History   Substance Use Topics   • Smoking status: Current Some Day Smoker     Packs/day: 0.00     Years: 30.00     Types: Cigarettes   • Smokeless tobacco: Never Used      Comment: 1/2-1.5 packs for 30 years   • Alcohol use No       Physical Exam     Vitals:    10/27/18 1030 10/27/18 1100 10/27/18 1130 10/27/18 1201   BP:   132/83    Pulse: 75  74 73   Resp: (!) 23 20 (!) 24 (!) 24   Temp:   36.7 °C (98.1 °F)    SpO2: 92%  91% 98%   Weight:   (!) 167.3 kg (368 lb 13.3 oz)    Height:         Body mass index is 43.74 kg/m².  /83   Pulse 73   Temp 36.7 °C (98.1 °F)   Resp (!) 24   Ht 1.956 m (6' 5\")   Wt (!) 167.3 kg (368 lb 13.3 oz)   SpO2 98%   BMI 43.74 kg/m²   O2 therapy: Pulse Oximetry: 98 %, O2 (LPM): 4, O2 Delivery: Silicone Nasal Cannula    Physical Exam   Constitutional: He is oriented to person, place, and time.   Morbid obesity.    HENT:   Head: Normocephalic and atraumatic.   Eyes: EOM are normal.   Cardiovascular: Normal rate, regular rhythm and intact distal pulses.    Murmur heard.   Systolic murmur is present with a grade of 2/6   Pulmonary/Chest: Tachypnea noted. He is in respiratory distress. He has wheezes. He exhibits tenderness.   Abdominal: Soft. Normal appearance and bowel sounds are normal. He exhibits distension.   Neurological: He is alert and oriented to person, place, and time.   Skin: Skin is warm and dry. Abrasion noted. No bruising, no ecchymosis and no rash noted. No cyanosis. Nails show no clubbing.   Venous statis ulcers present anterior aspect of both lower extremities. Abrasion of ulnar aspect of right arm   Psychiatric: Mood and affect normal.       Labs/Imaging   Dx-chest-portable (1 View)  Result Date: 10/27/2018  Impression:  1. Small right apical pneumothorax. "   2. Cardiomegaly with worsening interstitial and alveolar edema. Case was discussed with Taylor Nicole at approximately 9:40 AM on 10/27/2018. Case was discussed with    Dx-foot-2- Right  Result Date: 10/27/2018  Impression: No acute bony abnormality.    Uj-icwe-lpmkmehygu (with 1-view Cxr) Right  Result Date: 10/27/2018  FINDINGS: There are acute fractures of the anterolateral aspects of the right fifth through eighth ribs, and there is minimal subcutaneous air in that area. There is minimal extrapleural density along the right lateral chest wall, consistent with hemorrhage. There  is no pneumothorax. There are minimal right perihilar and basilar opacities, consistent with atelectasis.     Dx-shoulder 2+ Right  Result Date: 10/27/2018  Impression: Right seventh rib fracture.      Labs Reviewed   CBC WITH DIFFERENTIAL - Abnormal; Notable for the following:        Result Value    WBC 13.8 (*)     RBC 4.64 (*)     Hemoglobin 8.4 (*)     Hematocrit 33.0 (*)     MCV 71.1 (*)     MCH 18.1 (*)     MCHC 25.5 (*)     RDW 68.5 (*)     Neutrophils-Polys 76.90 (*)     Lymphocytes 6.80 (*)     Neutrophils (Absolute) 10.97 (*)     Lymphs (Absolute) 0.94 (*)     Monos (Absolute) 1.30 (*)     All other components within normal limits   COMP METABOLIC PANEL - Abnormal; Notable for the following:     Sodium 132 (*)     Glucose 122 (*)     Creatinine 1.46 (*)     Alkaline Phosphatase 107 (*)     Globulin 4.4 (*)     All other components within normal limits   BTYPE NATRIURETIC PEPTIDE - Abnormal; Notable for the following:     B Natriuretic Peptide 564 (*)    ESTIMATED GFR - Abnormal; Notable for the following:     GFR If Non  50 (*)    TROPONIN   LACTIC ACID   INFLUENZA A/B BY PCR   LACTIC ACID   PROCALCITONIN   LACTIC ACID   VIRAL RESPIRATORY CULTURE   INFLUENZA RAPID       Assessment/Plan   Mr. Medrano is a 52 year old male with shortness of breath. The differential includes, but is not limited to: COPD exacerbation,  "CHF exacerbation, bronchitis, or pneumonia. He also suffered right rib fractures 2/2 ground-level fall. He has a complex medical history including COPD, CHF, CAD s/p CABG, paroxysmal atrial fibrillation, morbid obesity, DM2, anemia, HTN, HLD, and medical noncompliance.     1. Shortness of Breath 2/2 COPD Exacerbation vs. CHF exacerbation vs. Asthma exacerbation  2. Acute Respiratory Failure with Hypoxia  - Two days of SOB, cough with sick contact  - Hx of COPD, CHF, mild intermittent asthma with multiple admissions for exacerbations of such  - Mild wheezes, increased O2 requirement from constant 2L at home  - Rapid flu neg; procalcitonin WNL;  similar to previous admissions  - CXR notable for \"small right apical pneumothorax\" and \"cardiomegaly with worsening interstitial and alveolar edema\"    Plan:   - RT protocol  - Duoneb q4h scheduled  - Prednisone 50mg PO for 5 days  - Doxycycline 100mg po q12h  - Lasix 20mg IV bid  - Continuous pulse ox; titrate nasal O2 to maintain SpO2 88-92%  - Guaifenesin prn    3. Rib Fractures  - 2/2 Ground level fall from EMS [facility]  - XR showed acute fractures of the anterolateral aspects of the right fifth through eighth ribs    Plan:   - Consult trauma  - Pain control:    - Tylenol 1g q8h   - Morphine 2mg IV q3h prn for severe pain  - Encourage use of IS    4. Leukocytosis  - Chronic in nature; improved from admission earlier this month  - Likely secondary to stress  - Infection less likely; patient afebrile and procalcitonin WNL    5. Anemia, microcytic  - Previous admissions for GI bleed from unclear origin - ?diverticular bleed  - Last admission this month diagnosed with iron-deficiency anemia  - Colonoscopy and EGD in July '18 revealed no source of the bleeding  - On 10/8/18, ferritin 14  - Start Ferrous sulfate 325 daily      Chronic Issues:    6. Congestive Heart Failure  - Last echo 10/10/18: LVEF 55%  - Lasix as above  - Continue home spironolactone 25 daily    7. " Paroxysmal Atrial Fibrillation  - EKG today sinus tachycardia  - Continue home amiodarone 200mg daily  - Anticoagulation was     8. Essential HTN  - /62 on presentation  - Continue home metoprolol sr 25mg daily  - Labetalol 10 IV q4h prn    9. Hyperlipidemia  - Continue home rosuvastatin 40 daily

## 2018-10-27 NOTE — ASSESSMENT & PLAN NOTE
Hx of Ischemic heart disease with bypass surgery in 2017    Home Amiodarone  Home Metoprolol SR  Home Spironolactone

## 2018-10-27 NOTE — H&P
Internal Medicine Admitting History and Physical    Note Author: Romario Dave M.D.       Name Joshua Medrano     1965   Age/Sex 52 y.o. male   MRN [id number]   Code Status Full Code     After 5PM or if no immediate response to page, please call for cross-coverage  Attending/Team: Mando/Margarita See Patient List for primary contact information  Call [phone] to page    1st Call - Day Intern (R1):   Tenzni 2nd Call - Day Sr. Resident (R2/R3):   Tia       Chief Complaint:   SOB    HPI:  Patient is a 52-year-old Morbidly Obese male came in for shortness of breath that started on the night of 10/26/2018.  PMH significant for CAD with bypass in 2017, COPD with 2L of home O2, Paroxysmal A-fib w/o anticoag due to recent Divertular bleed, HTN, and anemia. Pt reports sick contact at home. He also started coughing with yellow-greenish sputum that started on 10/27/2018 night. Pt endorses exacerbation of SOB that was NOT alleviated with 2L and one use of Albuterol. Pt ran out of all the inhalers. Pt reports orthopnea, paroxysmal nocturnal dyspnea, but denies chest pain, palpitations, dizziness, melena, hematochezia, diarrhea, constipation.  Patient is a current smoker with 1-2 cigarettes/day with 30 pack smoking history    During the transport of pt from home to ED, pt accidentally fell along with the EMS gurney causing fracture of Rib 5-8 on the right side with apical pneumothorax however, no bruising or redness at the chest. He also suffered scraping of R. Elbow which caused edema at the site.  X-ray was negative for fractures of arm    EDP physician spoke with Trauma PA and they agreed to see pt. Morphine was given the ED with some relief. Increased morphine from 1 to 2 mg.  Tramadol 100 mg every 6 hours.    Review of Systems   Constitutional: Positive for chills. Negative for fever and weight loss.   HENT: Negative for ear pain, hearing loss and tinnitus.    Respiratory: Positive for cough, sputum  production (Yellow-Green) and shortness of breath. Negative for hemoptysis.    Cardiovascular: Positive for orthopnea, leg swelling and PND. Negative for chest pain.   Gastrointestinal: Negative for heartburn and nausea.   Genitourinary: Negative for dysuria and urgency.   Musculoskeletal: Negative for myalgias and neck pain.   Skin: Negative for itching and rash.   Neurological: Negative for dizziness, tingling and headaches.   Endo/Heme/Allergies: Negative for environmental allergies. Does not bruise/bleed easily.   Psychiatric/Behavioral: Negative for depression and suicidal ideas.        Past Medical History (Chronic medical problem, known complications and current treatment)    CAD with Bypass-Amiodorone  COPD with home 2L on O2 and Albuterol  Paroxysmal A-fib-Metoprolol SR  Essential Hypertension     Past Surgical History:  Past Surgical History:   Procedure Laterality Date   • COLONOSCOPY - ENDO N/A 7/25/2018    Procedure: COLONOSCOPY - ENDO;  Surgeon: Josiah Molina D.O.;  Location: ENDOSCOPY [facility];  Service: Gastroenterology   • THORACOSCOPY Left 1/25/2018    Procedure: THORACOSCOPY- PLEURODESIS ;  Surgeon: Chandu Paez M.D.;  Location: SURGERY [facility];  Service: General   • MULTIPLE CORONARY ARTERY BYPASS ENDO VEIN HARVEST  12/22/2017    Procedure: MULTIPLE CORONARY ARTERY BYPASS ENDO VEIN HARVEST X2;  Surgeon: Kiel Ash M.D.;  Location: SURGERY [facility];  Service: Cardiothoracic   • JIM  12/22/2017    Procedure: JIM;  Surgeon: Kiel Ash M.D.;  Location: SURGERY [facility];  Service: Cardiothoracic   • OTHER ABDOMINAL SURGERY         Current Outpatient Medications:  Home Medications     Reviewed by Adriana Chow, Pharmacy Intern (Pharmacy Intern) on 10/27/18 at 0808  Med List Status: Complete   Medication Last Dose Status   albuterol 108 (90 Base) MCG/ACT Aero Soln inhalation aerosol 10/26/2018 Active   amiodarone (CORDARONE) 200 MG Tab 10/26/2018 Active    budesonide-formoterol (SYMBICORT) 160-4.5 MCG/ACT Aerosol > 4 days Active   furosemide (LASIX) 20 MG Tab 10/26/2018 Active   ipratropium-albuterol (DUONEB) 0.5-2.5 (3) MG/3ML nebulizer solution OUT Active   lisinopril (PRINIVIL) 2.5 MG Tab 10/26/2018 Active   metoprolol SR (TOPROL XL) 25 MG TABLET SR 24 HR 10/26/2018 Active   rosuvastatin (CRESTOR) 40 MG tablet 10/26/2018 Active   spironolactone (ALDACTONE) 25 MG Tab 10/26/2018 Active                Medication Allergy/Sensitivities:  Allergies   Allergen Reactions   • Cillins [Penicillins] Anaphylaxis and Rash     As a child, tolerated cefepime (12/2017), ceftriaxone (1/2018), cefazolin (12/2017)   • Erythromycin Anaphylaxis     Rxn - 1994   • Metoprolol      Pt stated got latham and aggressive, in demi dose's per wife.      • Shellfish Allergy Rash     Pt stated that he is allergic to all seafood, will develop a rash and says that rash will clear up with benadryl         Family History (mandatory)   Family History   Problem Relation Age of Onset   • Diabetes Mother    • Stroke Mother    • Leukemia Mother    • Diabetes Father    • No Known Problems Sister    • Heart Disease Brother         PPM   • Lung Disease Brother    • Alcohol/Drug Brother    • Diabetes Sister        Social History (mandatory)   Social History     Social History   • Marital status:      Spouse name: N/A   • Number of children: N/A   • Years of education: N/A     Occupational History   • Not on file.     Social History Main Topics   • Smoking status: Current Some Day Smoker     Packs/day: 0.00     Years: 30.00     Types: Cigarettes   • Smokeless tobacco: Never Used      Comment: 1/2-1.5 packs for 30 years   • Alcohol use No   • Drug use: No   • Sexual activity: Not on file     Other Topics Concern   • Not on file     Social History Narrative   • No narrative on file     Living situation: With Wife  PCP : Nam Le M.D.    Physical Exam     Vitals:    10/27/18 1000 10/27/18 1030  "10/27/18 1100 10/27/18 1130   BP:    132/83   Pulse:  75  74   Resp: 19 (!) 23 20 (!) 24   Temp:    36.7 °C (98.1 °F)   SpO2:  92%  91%   Weight:    (!) 167.3 kg (368 lb 13.3 oz)   Height:         Body mass index is 43.74 kg/m².  /83   Pulse 74   Temp 36.7 °C (98.1 °F)   Resp (!) 24 Comment: RN notified  Ht 1.956 m (6' 5\")   Wt (!) 167.3 kg (368 lb 13.3 oz)   SpO2 91%   BMI 43.74 kg/m²   O2 therapy: Pulse Oximetry: 91 %, O2 (LPM): 4, O2 Delivery: Silicone Nasal Cannula    Physical Exam   Constitutional: He is oriented to person, place, and time and well-developed, well-nourished, and in no distress. No distress.   Morbidly Obese male      HENT:   Head: Normocephalic and atraumatic.   Mouth/Throat: No oropharyngeal exudate.   Mallampati score 3-4   Eyes: Pupils are equal, round, and reactive to light. Conjunctivae and EOM are normal. Right eye exhibits no discharge. Left eye exhibits no discharge.   Neck: Normal range of motion. Neck supple. No thyromegaly present.   Cardiovascular: Normal rate, regular rhythm and intact distal pulses.  Exam reveals no friction rub.    Murmur (2/6 systolic murmur) heard.  Pulmonary/Chest: No respiratory distress. He has wheezes (All lung fields). He exhibits no tenderness.   Accessory muscle use   Abdominal: Soft. He exhibits no distension (Distension of abdomen ). There is tenderness (On the R. lower chest. No bruising or erythema).   Musculoskeletal: He exhibits edema (Swollen legs due to morbid obesity and 1+ pitting edema going upto Thigh) and tenderness (R. Forearm and right lower chest around eighth rib). He exhibits no deformity.   R. Forearm abraison with edema at the R. forearm   Neurological: He is alert and oriented to person, place, and time. No cranial nerve deficit. Coordination normal. GCS score is 15.   Skin: Skin is warm and dry. He is not diaphoretic. No erythema.   Psychiatric: Mood, memory, affect and judgment normal.         Data Review       Old " Records Request:   Completed  Current Records review/summary: Completed    Lab Data Review:  Recent Results (from the past 24 hour(s))   CBC w/ Differential    Collection Time: 10/27/18  4:58 AM   Result Value Ref Range    WBC 13.8 (H) 4.8 - 10.8 K/uL    RBC 4.64 (L) 4.70 - 6.10 M/uL    Hemoglobin 8.4 (L) 14.0 - 18.0 g/dL    Hematocrit 33.0 (L) 42.0 - 52.0 %    MCV 71.1 (L) 81.4 - 97.8 fL    MCH 18.1 (L) 27.0 - 33.0 pg    MCHC 25.5 (L) 33.7 - 35.3 g/dL    RDW 68.5 (H) 35.9 - 50.0 fL    Platelet Count 302 164 - 446 K/uL    MPV 9.7 9.0 - 12.9 fL    Neutrophils-Polys 76.90 (H) 44.00 - 72.00 %    Lymphocytes 6.80 (L) 22.00 - 41.00 %    Monocytes 9.40 0.00 - 13.40 %    Eosinophils 3.40 0.00 - 6.90 %    Basophils 0.90 0.00 - 1.80 %    Nucleated RBC 0.10 /100 WBC    Neutrophils (Absolute) 10.97 (H) 1.82 - 7.42 K/uL    Lymphs (Absolute) 0.94 (L) 1.00 - 4.80 K/uL    Monos (Absolute) 1.30 (H) 0.00 - 0.85 K/uL    Eos (Absolute) 0.47 0.00 - 0.51 K/uL    Baso (Absolute) 0.12 0.00 - 0.12 K/uL    NRBC (Absolute) 0.02 K/uL    Hypochromia 2+     Anisocytosis 2+     Macrocytosis 1+     Microcytosis 2+    Complete Metabolic Panel (CMP)    Collection Time: 10/27/18  4:58 AM   Result Value Ref Range    Sodium 132 (L) 135 - 145 mmol/L    Potassium 4.3 3.6 - 5.5 mmol/L    Chloride 101 96 - 112 mmol/L    Co2 23 20 - 33 mmol/L    Anion Gap 8.0 0.0 - 11.9    Glucose 122 (H) 65 - 99 mg/dL    Bun 19 8 - 22 mg/dL    Creatinine 1.46 (H) 0.50 - 1.40 mg/dL    Calcium 9.0 8.5 - 10.5 mg/dL    AST(SGOT) 23 12 - 45 U/L    ALT(SGPT) 13 2 - 50 U/L    Alkaline Phosphatase 107 (H) 30 - 99 U/L    Total Bilirubin 1.2 0.1 - 1.5 mg/dL    Albumin 3.6 3.2 - 4.9 g/dL    Total Protein 8.0 6.0 - 8.2 g/dL    Globulin 4.4 (H) 1.9 - 3.5 g/dL    A-G Ratio 0.8 g/dL   Btype Natriuretic Peptide    Collection Time: 10/27/18  4:58 AM   Result Value Ref Range    B Natriuretic Peptide 564 (H) 0 - 100 pg/mL   Troponin STAT    Collection Time: 10/27/18  4:58 AM   Result Value  Ref Range    Troponin I 0.02 0.00 - 0.04 ng/mL   LACTIC ACID    Collection Time: 10/27/18  4:58 AM   Result Value Ref Range    Lactic Acid 1.9 0.5 - 2.0 mmol/L   DIFFERENTIAL MANUAL    Collection Time: 10/27/18  4:58 AM   Result Value Ref Range    Bands-Stabs 2.60 0.00 - 10.00 %    Manual Diff Status PERFORMED    PERIPHERAL SMEAR REVIEW    Collection Time: 10/27/18  4:58 AM   Result Value Ref Range    Peripheral Smear Review see below    PLATELET ESTIMATE    Collection Time: 10/27/18  4:58 AM   Result Value Ref Range    Plt Estimation Normal    MORPHOLOGY    Collection Time: 10/27/18  4:58 AM   Result Value Ref Range    RBC Morphology Present     Polychromia 1+     Poikilocytosis 1+     Ovalocytes 2+     Schistocytes 2+     Target Cells 2+     Tear Drop Cells 1+     Basophilic Stippling Few    ESTIMATED GFR    Collection Time: 10/27/18  4:58 AM   Result Value Ref Range    GFR If African American >60 >60 mL/min/1.73 m 2    GFR If Non African American 50 (A) >60 mL/min/1.73 m 2   PROCALCITONIN    Collection Time: 10/27/18  4:58 AM   Result Value Ref Range    Procalcitonin 0.15 <0.25 ng/mL   EKG    Collection Time: 10/27/18  5:03 AM   Result Value Ref Range    Report       [facility]    Test Date:  2018-10-27  Pt Name:    JARRETT WHITE                   Department: ER  MRN:        [id number]                      Room:       BL 15  Gender:     Male                         Technician: [id number]  :        1965                   Requested By:KEYLA SANTIAGO  Order #:    [id number]                    Reading MD:    Measurements  Intervals                                Axis  Rate:       70                           P:          0  MO:         151                          QRS:        -54  QRSD:       112                          T:          90  QT:         428  QTc:        462    Interpretive Statements  SINUS TACHYCARDIA  PAIRED VENTRICULAR PREMATURE COMPLEXES  PROBABLE LEFT ATRIAL  ABNORMALITY  LEFT ANTERIOR FASCICULAR BLOCK  BORDERLINE R WAVE PROGRESSION, ANTERIOR LEADS  Compared to ECG 09/09/2018 19:46:13  Ventricular premature complex(es) now present  Left anterior fascicular block now present  Sinus rhythm no longer present   First degree AV block no longer present  Left-axis deviation no longer present  T-wave abnormality no longer present     INFLUENZA A/B BY PCR    Collection Time: 10/27/18  9:00 AM   Result Value Ref Range    Influenza virus A RNA Negative Negative    Influenza virus B, PCR Negative Negative       Imaging/Procedures Review:    Independant Imaging Review: Completed  DX-CHEST-PORTABLE (1 VIEW)   Final Result         1. Small right apical pneumothorax.   2. Cardiomegaly with worsening interstitial and alveolar edema.      Case was discussed with Taylor Nicole at approximately 9:40 AM on 10/27/2018.      Case was discussed with      DX-FOOT-2- RIGHT   Final Result      No acute bony abnormality.      DX-SHOULDER 2+ RIGHT   Final Result      Right seventh rib fracture.      [id number] (WITH 1-VIEW CXR) RIGHT   Final Result      Right-sided rib fractures as described above.      DX-ELBOW-LIMITED 2- RIGHT    (Results Pending)   DX-CHEST-PORTABLE (1 VIEW)    (Results Pending)        EKG:   EKG Independant Review: Completed  QTc:462, HR: 70, Normal Sinus Rhythm, no ST/T changes     Records reviewed and summarized in current documentation :  Yes  [employer] teaching service handout given to patient:  No         Assessment/Plan     * Acute on chronic respiratory failure (HCC)   Assessment & Plan    Pt presented with SOB that has gotten progressively worse in the last 24 hours  COPD exacerbation vs CHF with Preserved EF vs SHASHANK  Pt is on home O2 of 2L but he was still SOB, requiring 4L NC  Patient reports orthopnea, paroxysmal nocturnal dyspnea  Follow up with outpatient PCP for sleep apnea study  Pro-Giovany within normal limits    Plan  Gentle diuresis with  Lasix  Albuterol  Ipratropium  Prednisone  Respiratory therapy per protocol  Spironolactone              Pneumothorax- (present on admission)   Assessment & Plan    Pt suffered from Rib fracture 5-8  -Apical pneumothorax on CXR  -trachea midline  -PE limited by body habitus and pain. Wheezing present  - Pt is tachypnic     Plan  Continue O2  CT chest w/o contrast  Tramadol Q6  Morphine PRN  RT per protocal  Incentive spirometry           Closed fracture of multiple ribs of right side- (present on admission)   Assessment & Plan    Pt suffered from fall from EMS [place]  X-ray showed multiple closed rib fractures from 5-8 with Apical pneumothorax   Pt reports pain at R. Lower chest. No erythema or bruising      CT Chest w/o contrast  Scheduled Tramadol 100mg Q6  PRN Morphine 2mg  Lidocaine gel   Trauma Surgery on board  Incentive spirometry to prevent Atelectasis  PRN Tylenol                Heart failure with preserved ejection fraction, borderline, class IV (HCC)- (present on admission)   Assessment & Plan    Inability to carry any physical activity  Pt has elevated BNP of 593 on 10/27. Possible Cor Pulmoary   on 10/9. Troponin 0.02 which is baseline for pt  Pt has 1+ edema going up to thigh  Distended stomach with fluid shift  CXR with Alveolar edema    Amiodarone  Lasix IV 20 mg  Metoprolol SR 25 mg  Spironolactone 25 mg  ECHO with 55% EF                 Chronic obstructive pulmonary disease with acute exacerbation (HCC)- (present on admission)   Assessment & Plan    Pt is a current smoker with 30 Pack smoking hx with home O2 on 2L NC  Pt presented with SOB exacerbation secondary to possible infection vs CHF  Procal WNL  Elevated WBC, however this is chronic for pt    Plan  Continue O2  Albuterol  Ipratropium  Prednisone  Respiratory therapy per protocol            Morbid obesity with BMI of 40.0-44.9, adult (HCC)- (present on admission)   Assessment & Plan    BMI of 43.74  Pt education at discharge           Microcytic anemia- (present on admission)   Assessment & Plan    Pt has hx of Microcytic anemia  Pt's baseline Hgb is around 7  Hx of GI blood loss  Low iron at admission  Hx of Intermittent Hematochezia and Melena in the last 3-6 months    Ferrous Sulfate  CTF  Occult blood pending            SHASHANK (obstructive sleep apnea)- (present on admission)   Assessment & Plan    Pt is morbidly obese with Mallampatti score of 3-4  Pt reports orthopnea and PND  Pt on home O2 of 2L NC requiring 4L in hospital  Out pt Sleep Study  F/U with PCP        CAD (coronary artery disease)- (present on admission)   Assessment & Plan    Hx of Ischemic heart disease with bypass surgery in 2017    Home Amiodarone  Home Metoprolol SR  Home Spironolactone          Chronic systolic CHF (congestive heart failure), NYHA class 4 ([facility])- (present on admission)   Assessment & Plan    Inability to carry any physical activity  Pt has elevated BNP of 593 on 10/27. Possible Cor Pulmoary   on 10/9. Troponin 0.02 which is baseline for pt  Pt has 1+ edema going up to thigh  Distended stomach with fluid shift  CXR with Alveolar edema    Amiodarone  Lasix IV 20 mg  Metoprolol SR 25 mg  Spironolactone 25 mg  ECHO with 55% EF               Forearm abrasion, right, initial encounter   Assessment & Plan    During transport to ED patient unfortunately fell along with the gurney causing right forearm abrasions.  -Edema without fractures of the right forearm    Wound care  Pain control with tramadol 100 mg every 6  As needed morphine 2 mg        Dyslipidemia- (present on admission)   Assessment & Plan    Pt morbidly obese with hx of Dyslipidemia    Lipid panel ordered          CKD (chronic kidney disease), stage III ([facility])- (present on admission)   Assessment & Plan    GFR of 50, Cr 1.46 and BUN of 19  Hx of CKD    Gentle diuresis with Lasix  ctf          Cigarette nicotine dependence with nicotine-induced disorder- (present on admission)   Assessment & Plan     Pt is is current smoker of 1-2 cigarrets/day with 30 pack smoking history  Pt reports he will stop smoking after this hospital stay  Will discuss with pt at time of discharge    Nicotine patch        Paroxysmal atrial fibrillation (HCC)- (present on admission)   Assessment & Plan    Pt has hx of paraxysmal A-fib  Anti-Coagulation was held due to trauma also recent GI bleed in last 2-5 months  Pt is in Sinus with normal HR during this hospital stay  Follow-up with PCP           Type 2 diabetes mellitus with complication, without long-term current use of insulin (HCC)- (present on admission)   Assessment & Plan    Pt is a morbidly obese male with BMI of 43.74    HgbA1C 5.9 on 3/28/2018  Lipid panel ordered  POC glucose of 122            Anticipated Hospital stay:  >2 midnights        Quality Measures  Quality-Core Measures   Reviewed items::  Labs reviewed, Medications reviewed, EKG reviewed and Radiology images reviewed  Ceballos catheter::  No Ceballos  DVT prophylaxis - mechanical:  SCDs    PCP: Nam Le M.D.

## 2018-10-27 NOTE — ASSESSMENT & PLAN NOTE
Prior echo with EF of 55%, HEFpEF      -Pt's O2 requirements have come down to 2L, baseline for pt  -Lasix   -Home Amiodarone  -Metoprolol XR  -Spironolactone

## 2018-10-27 NOTE — ASSESSMENT & PLAN NOTE
10/27 Rib series with right fifth through eighth ribs, and there is minimal subcutaneous air in that area.   There is minimal extrapleural density along the right lateral chest wall, consistent with hemorrhage.  Attempted CT chest - too large to fit in scanner  Blunt chest protocol. Aggressive pulmonary hygiene and pain management.

## 2018-10-27 NOTE — ASSESSMENT & PLAN NOTE
Pt suffered from Rib fracture 5-8  -pneumothorax seems to have resolved  -Pt denies any pain   -d/c today    Plan  Continue O2  Tramadol   Morphine PRN  RT per protocal  Incentive spirometry

## 2018-10-27 NOTE — PROGRESS NOTES
2 RN skin check completed with MIN Collins. Ears red, b;lanching, silicone nasal cannula in use. Abraision right forearm, dressing cdi. Anterior right lower leg with chronic lesion, currently closed over. Legs with stasis dermatitis. Heels red, blanching. Right 5th toe with abraision, dressing cdi. Pt is currently unable to turn on his side to check skin on back and buttocks. Will re-try once respiratory status and pain level improves.No other skin breakdown noted.

## 2018-10-27 NOTE — ASSESSMENT & PLAN NOTE
Pt has hx of Microcytic anemia  Pt's baseline Hgb is around 7, stable  Low iron at admission    Ferrous Sulfate  Neg occult blood

## 2018-10-27 NOTE — PROGRESS NOTES
Tried to scan patient, could not get patient to fit in the scanner as he could not raise his arms. Even with his one arm on the chest the patient was grinding the table. Sent patient back upstairs. RN notified

## 2018-10-27 NOTE — CONSULTS
"Trauma Surgery Consult  10/27/2018    Admitting Physician:    Consulting Physician: Stalin Richter MD.     CC: Trauma The patient was triaged as a T-5000 in accordance with established pre hospital protols. An expeditious primary and secondary survey with required adjuncts was conducted. See Trauma Narrator for full details.    HPI: This is a 52 y.o male presents to [facility] via [facility] with a chief complaint of shortness of breath.  Patient states he \"caught a cold\" and started using his nebulizer frequently but then ran out of the medication. He was still short of breath, prompting his call to 911.  [facility] were [facility] related issues and the patient fell off the gurney.   He struck the ground with  his right side.  He has complaints of right shoulder, right chest wall and right foot pain.  He sustained an abrasion to his right forearm during this fall.  He denies any recent fever.  No chest pain, prior to the fall.  He has  COPD with 2L of home O2.   Pt reports a sick contact at home. He developed a productive cough with yellow-greenish sputum on 10/27/2018. His exacerbation of SOB was NOT releived with 4L and one use of Albuterol. No nausea, vomiting or diarrhea. With the fall, he did not strike his head. He did not lose consciousness.       Past Medical History:   Diagnosis Date   • ASTHMA    • Atrial fibrillation (HCC)    • CAD (coronary artery disease)    • Chronic obstructive pulmonary disease (HCC)    • Congestive heart failure (HCC)    • Diabetes        Past Surgical History:   Procedure Laterality Date   • COLONOSCOPY - ENDO N/A 7/25/2018    Procedure: COLONOSCOPY - ENDO;  Surgeon: Josiah Molina D.O.;  Location: ENDOSCOPY [facility];  Service: Gastroenterology   • THORACOSCOPY Left 1/25/2018    Procedure: THORACOSCOPY- PLEURODESIS ;  Surgeon: Chandu Paez M.D.;  Location: SURGERY [facility];  Service: General   • MULTIPLE CORONARY ARTERY BYPASS ENDO " VEIN HARVEST  12/22/2017    Procedure: MULTIPLE CORONARY ARTERY BYPASS ENDO VEIN HARVEST X2;  Surgeon: Kiel Ash M.D.;  Location: SURGERY [facility];  Service: Cardiothoracic   • JIM  12/22/2017    Procedure: JIM;  Surgeon: Kiel Ash M.D.;  Location: SURGERY [facility];  Service: Cardiothoracic   • OTHER ABDOMINAL SURGERY         Current Facility-Administered Medications   Medication Dose Route Frequency Provider Last Rate Last Dose   • albuterol (PROVENTIL) 2.5mg/0.5ml nebulizer solution 2.5 mg  2.5 mg Nebulization ONCE (RT) Taylor Nicole D.O.   Stopped at 10/27/18 0530   • ipratropium (ATROVENT) 0.02 % nebulizer solution 0.5 mg  0.5 mg Nebulization ONCE (RT) Taylor Nicole, D.O.   Stopped at 10/27/18 0530   • senna-docusate (PERICOLACE or SENOKOT S) 8.6-50 MG per tablet 2 Tab  2 Tab Oral BID Leandro Weber M.D.        And   • polyethylene glycol/lytes (MIRALAX) PACKET 1 Packet  1 Packet Oral QDAY PRN Leandro Weber M.D.        And   • magnesium hydroxide (MILK OF MAGNESIA) suspension 30 mL  30 mL Oral QDAY PRN Leandro Weber M.D.        And   • bisacodyl (DULCOLAX) suppository 10 mg  10 mg Rectal QDAY PRN Leandro Weber M.D.       • Respiratory Care per Protocol   Nebulization Continuous RT Leandro Weber M.D.       • labetalol (NORMODYNE,TRANDATE) injection 10 mg  10 mg Intravenous Q4HRS PRN Leandro Weber M.D.       • guaiFENesin dextromethorphan (ROBITUSSIN DM) 100-10 MG/5ML syrup 10 mL  10 mL Oral Q6HRS PRN Leandro Weber M.D.       • nicotine (NICODERM) 14 MG/24HR 14 mg  14 mg Transdermal Daily-0600 Leandro Weber M.D.        And   • nicotine polacrilex (NICORETTE) 2 MG piece 2 mg  2 mg Oral Q HOUR PRN Leandro Weber M.D.       • acetaminophen (TYLENOL) tablet 1,000 mg  1,000 mg Oral Q8HR Leandro Weber M.D.   1,000 mg at 10/27/18 1158   • furosemide (LASIX) injection 20 mg  20 mg Intravenous BID DIURETIC Leandro Weber M.D.   20 mg at 10/27/18 1156   • spironolactone (ALDACTONE)  tablet 25 mg  25 mg Oral DAILY Leandro Weber M.DAlex   25 mg at 10/27/18 1157   • rosuvastatin (CRESTOR) tablet 40 mg  40 mg Oral DAILY Leandro Weber M.D.   40 mg at 10/27/18 1156   • amiodarone (CORDARONE) tablet 200 mg  200 mg Oral DAILY Leandro Weber M.D.   200 mg at 10/27/18 1157   • metoprolol SR (TOPROL XL) tablet 25 mg  25 mg Oral DAILY Leandro Weber M.D.   25 mg at 10/27/18 1157   • albuterol (PROVENTIL) 2.5mg/0.5ml nebulizer solution 2.5 mg  2.5 mg Nebulization ONCE (RT) Taylor Nicole D.OAlex   Stopped at 10/27/18 1015   • ipratropium (ATROVENT) 0.02 % nebulizer solution 0.5 mg  0.5 mg Nebulization ONCE (RT) KELSI [place].OAlex   Stopped at 10/27/18 1015   • morphine (pf) 4 mg/ml injection 2 mg  2 mg Intravenous Q3HRS PRN Romario Dave M.D.   2 mg at 10/27/18 1057   • predniSONE (DELTASONE) tablet 50 mg  50 mg Oral DAILY Romario Dave M.D.   50 mg at 10/27/18 1156   • doxycycline monohydrate (ADOXA) tablet 100 mg  100 mg Oral Q12HRS Romario Dave M.D.   100 mg at 10/27/18 1158   • [START ON 10/28/2018] ferrous sulfate tablet 325 mg  325 mg Oral QDAY with Breakfast Romario Dave M.D.       • ipratropium-albuterol (DUONEB) nebulizer solution  3 mL Nebulization Q4HRS (RT) Leandro Weber M.D.   3 mL at 10/27/18 1056       Social History     Social History   • Marital status:      Spouse name: N/A   • Number of children: N/A   • Years of education: N/A     Occupational History   • Not on file.     Social History Main Topics   • Smoking status: Current Some Day Smoker     Packs/day: 0.00     Years: 30.00     Types: Cigarettes   • Smokeless tobacco: Never Used      Comment: 1/2-1.5 packs for 30 years   • Alcohol use No   • Drug use: No   • Sexual activity: Not on file     Other Topics Concern   • Not on file     Social History Narrative   • No narrative on file       Family History   Problem Relation Age of Onset   • Diabetes Mother    • Stroke Mother    • Leukemia Mother    • Diabetes Father    • No  "Known Problems Sister    • Heart Disease Brother         PPM   • Lung Disease Brother    • Alcohol/Drug Brother    • Diabetes Sister        Allergies:  Cillins [penicillins]; Erythromycin; Metoprolol; and Shellfish allergy    Review of Systems:  Constitutional: Negative for fever, chills, weight loss, malaise/fatigue and diaphoresis.   HENT: Negative for hearing loss, ear pain, nosebleeds, congestion, sore throat, neck pain, and ear discharge.    Eyes: Negative for blurred vision, double vision, and redness.   Respiratory: Negative for cough, sputum production, and stridor.  Positive for  shortness of breath and wheezing.  Cardiovascular: Negative for chest pain, palpitations. Positive for right lateral chest wall pain.  Gastrointestinal: Negative for heartburn, nausea, vomiting, abdominal pain, diarrhea, constipation.  Genitourinary: Negative for dysuria, urgency, frequency.   Musculoskeletal: Negative for myalgias, back pain, joint pain and falls.  Positive for BLE swelling .  Skin: Negative for itching and rash.  Neurological: Negative for dizziness, loss of consciousness, weakness and headaches.   Endo/Heme/Allergies: Negative for environmental allergies. Does not bruise/bleed easily.   Psychiatric/Behavioral: Negative for depression and substance abuse. The patient is not nervous/anxious.    Physical Exam:  Blood pressure 132/83, pulse 73, temperature 36.7 °C (98.1 °F), resp. rate (!) 24, height 1.956 m (6' 5\"), weight (!) 167.3 kg (368 lb 13.3 oz), SpO2 98 %.    Constitutional: Awake, alert, oriented x3. No acute distress. GCS 15. E4 V5 M6.  Head: No cephalohematoma. Pupils are 2 mm,  reactive bilaterally. Midface stable. No malocclusion.  TMs clear bilaterally. No drainage from the mouth or nose.  Neck: No tracheal deviation. No midline cervical spine tenderness.  Cardiovascular: Normal rate, regular rhythm, normal heart sounds and intact distal pulses.  Exam reveals no gallop and no friction rub.  No murmur " heard.  Pulmonary/Chest: Clavicles nontender to palpation. There is right lateral chest wall tenderness.  No crepitus. Positive breath sounds bilaterally - decreased at bases L = R  Abdominal: Soft, obese, nondistended. Nontender to palpation. Pelvis is stable to anterior-posterior compression.  Musculoskeletal: Right upper extremity grossly atraumatic, palpable radial pulse. 5/5  strength. Full ROM and strength at elbow.  Left upper extremity grossly atraumatic, palpable radial pulse. 5/5  strength. Full ROM and strength at elbow.  Right lower extremity grossly atraumatic. 5/5 strength in ankle plantar flexion and dorsiflexion. No pain and full ROM at right knee and hip. Chronic venous changes, 3 + pitting edema  Left  lower extremity grossly atraumatic. 5/5 strength in ankle plantar flexion and dorsiflexion. No pain and full ROM at left knee and hip. Chronic venous changes, 3 + pitting edema  Back: Midline thoracic and lumbar spines are nontender to palpation. No step-offs.   : Normal male external genitalia. Rectal exam not done.  Neurological: Sensation intact to light touch dorsum and plantar surfaces of both feet and the medial and lateral aspects of both lower legs.  Sensation intact to light touch dorsum and plantar surfaces of both hands.   Skin: Skin is warm and dry.  No diaphoresis. No erythema. No pallor.     Labs:  Recent Labs      10/27/18   0458   WBC  13.8*   RBC  4.64*   HEMOGLOBIN  8.4*   HEMATOCRIT  33.0*   MCV  71.1*   MCH  18.1*   MCHC  25.5*   RDW  68.5*   PLATELETCT  302   MPV  9.7     Recent Labs      10/27/18   0458   SODIUM  132*   POTASSIUM  4.3   CHLORIDE  101   CO2  23   GLUCOSE  122*   BUN  19   CREATININE  1.46*   CALCIUM  9.0         Recent Labs      10/27/18   0458   ASTSGOT  23   ALTSGPT  13   TBILIRUBIN  1.2   ALKPHOSPHAT  107*   GLOBULIN  4.4*       Radiology:  DX-CHEST-PORTABLE (1 VIEW)   Final Result         1. Small right apical pneumothorax.   2. Cardiomegaly with  worsening interstitial and alveolar edema.      Case was discussed with Taylor Nicole at approximately 9:40 AM on 10/27/2018.      Case was discussed with      DX-FOOT-2- RIGHT   Final Result      No acute bony abnormality.      DX-SHOULDER 2+ RIGHT   Final Result      Right seventh rib fracture.      [id number] (WITH 1-VIEW CXR) RIGHT   Final Result      Right-sided rib fractures as described above.      DX-ELBOW-LIMITED 2- RIGHT    (Results Pending)   DX-CHEST-PORTABLE (1 VIEW)    (Results Pending)         Assessment: This is a 52 y.o with CHF exacerbation, CPOD exacerbation, right rib fractutres, possible right pneumothorax,     Plan: Admit to medicine service for the multiple medical issues  Serial CXR to evaluate for PTX - HOB to 90.  Continue NPO except for sips with meds   Multimodal pain therapy for right lateral rib fractures.  THe patient needs a CT of his chest but at this time is unable to lay supine. Once his  medical issues stabilize - he should be able  to tolerated a CT of chest without contrast.    Closed fracture of multiple ribs of right side  The right fifth through eighth ribs, and there is minimal subcutaneous air in that area.   There is minimal extrapleural density along the right lateral chest wall, consistent with hemorrhage.  Blunt chest protocol. Aggressive pulmonary hygiene and pain management.    Morbid obesity with BMI of 40.0-44.9, adult ([facility])  BMI 41.5    Pneumothorax  Small right apical pneumothorax  Trend CXR imaging    Type 2 diabetes mellitus with complication, without long-term current use of insulin ([facility])  Pt is a morbidly obese male with BMI of 43.74    HgbA1C 5.9 on 3/28/2018  Lipid panel ordered  POC glucose of 122    SHASHANK (obstructive sleep apnea)  Pt is morbidly obese with Mallampatti score of 3-4  Pt on home O2 of 2L NC  Out pt Sleep Study  F/U with PCP    Morbid obesity with BMI of 40.0-44.9, adult ([facility])  BMI of 43.74  Pt education at discharge      Microcytic  anemia  Pt has hx of Microcytic anemia  Low iron at admission  Hx of Intermittent Hematochezia and Melena in the last 3-6 months    Ferrous Sulfate  CTF          Dyslipidemia  Pt morbidly obese with hx of Dyslipidemia    Lipid panel ordered      COPD (chronic obstructive pulmonary disease) (HCC)  Pt is a current smoker with 30 Pack smoking hx with home O2 on 2L NC  Pt presented with SOB exacerbation secondary to possible infection vs CHF  Procal WNL  Elevated WBC, however this is chronic for pt    Plan  Albuterol  Ipratropium  Prednisone  Respiratory therapy per protocol      Time spent: Trauma / Critical Care Time 60 minutes excluding procedures.    Stalin Richter MD  [facility]  [phone]

## 2018-10-27 NOTE — ASSESSMENT & PLAN NOTE
COPD exacerbation vs HFpEF vs SHASHANK   Improving SOB, PND, Orthopnea, bilateral lower leg edema.  Oxygen requirement down to baseline at 2 L/min    Plan  Continue Lasix to 40 PO  Continues scheduled duo nebs  Prednisone  respiratory therapy per protocol  Cefdinir

## 2018-10-27 NOTE — ASSESSMENT & PLAN NOTE
Right fifth to eighth rib fracture.   Pneumothorax, resolved  Pt controlled with tramadol and scheduled Tylenol.  Has not needed IV morphine in the last 24 hours.  Able to pull 2000 on incentive spirometry.

## 2018-10-27 NOTE — SENIOR ADMIT NOTE
Patient is a 52-year-old male with a past medical history of coronary artery disease status post CABG, diverticular bleed a few months ago leading to iron deficiency anemia, history of COPD who presented to the ED with worsening shortness of breath for the last 1-2 days, however during transportation patient fell in the gurney leading to fractured rib on the right side from fifth to eighth rib and he landed on his right foot..  He was found to be in significant amount of pain in the ED.  As per his shortness of breath, he reports that for the last few days he has been having increased amount of productive sputum which is greenish yellow in color, subjective chills and fever, his grandson has been sick with a chest cold, and he has recently run out of his Lasix and inhalers.  He is on 2 L of oxygen at baseline, which was seen to be up to 4 L in the ED.    In the ED, chest x-ray was done which showed a small pneumothorax on the right lung, also fracture of the fifth to eighth rib on the right, foot x-ray did not show any acute fracture.    On exam: Patient appears to be in mild respiratory distress, unable to take a deep breath due to pain on the right, cardiovascular exam benign with regular S1-S2 and A. fib not appreciated, respiratory exam: Decreased air entry bilaterally, scattered expiratory wheezing, no significant crackles heard; bilateral lower extremity edema up to the knees with chronic skin changes; right forearm skin abrasion with dried blood, right foot warm with distal pulses intact.    Pertinent labs: Leukocytosis with WBC count of 13.8, has been noted to be chronically elevated  BNP of 568, higher than few weeks ago  BUN/creatinine largely unchanged from prior admission  Mild hyponatremia at 132  Pro-calcitonin not elevated  Rapid flu test negative    Impression;  Acute hypoxic respiratory failure likely secondary to COPD exacerbation from viral versus bacterial etiology, also mild underlying  exacerbation of heart failure with preserved ejection fraction with cor pulmonale/pulmonary hypertension.    Rib fractures with pain due to fall/trauma to the chest wall with a small right pneumothorax, for which he had physician called trauma surgery, recommendations are still pending.    Iron deficiency anemia from recent diverticular bleed, stable not needing transfusion at this time.    Plan:  Admit to telemetry  Gentle diuresis with Lasix twice daily 20 mg.  Monitor I's and O's  T protocol, schedule duo nebs every 4 hour  Doxycycline  Analgesia with scheduled Tylenol, scheduled tramadol, as needed morphine; we will consider PCA pump if pain is not controlled despite this however carefully given blood pressure borderline in low 100s and COPD exacerbation.  Follow recommendation of trauma surgery, order CT thorax without contrast, as per trauma surgery, reported by patient's RN  Oral prednisone  Resume home medication  Iron supplement    For further details please see H&P by Dr. Dave

## 2018-10-28 ENCOUNTER — APPOINTMENT (OUTPATIENT)
Dept: RADIOLOGY | Facility: MEDICAL CENTER | Age: 53
DRG: 291 | End: 2018-10-28
Attending: SURGERY
Payer: OTHER MISCELLANEOUS

## 2018-10-28 PROBLEM — I50.9 ACUTE EXACERBATION OF CHF (CONGESTIVE HEART FAILURE) (HCC): Status: ACTIVE | Noted: 2017-11-23

## 2018-10-28 LAB
ALBUMIN SERPL BCP-MCNC: 3.7 G/DL (ref 3.2–4.9)
ALBUMIN/GLOB SERPL: 0.8 G/DL
ALP SERPL-CCNC: 113 U/L (ref 30–99)
ALT SERPL-CCNC: 11 U/L (ref 2–50)
ANION GAP SERPL CALC-SCNC: 12 MMOL/L (ref 0–11.9)
ANISOCYTOSIS BLD QL SMEAR: ABNORMAL
AST SERPL-CCNC: 21 U/L (ref 12–45)
BASE EXCESS BLDA CALC-SCNC: -4 MMOL/L (ref -4–3)
BASOPHILS # BLD AUTO: 0 % (ref 0–1.8)
BASOPHILS # BLD: 0 K/UL (ref 0–0.12)
BILIRUB SERPL-MCNC: 1.1 MG/DL (ref 0.1–1.5)
BNP SERPL-MCNC: 962 PG/ML (ref 0–100)
BODY TEMPERATURE: ABNORMAL CENTIGRADE
BUN SERPL-MCNC: 21 MG/DL (ref 8–22)
CALCIUM SERPL-MCNC: 9.4 MG/DL (ref 8.5–10.5)
CHLORIDE SERPL-SCNC: 101 MMOL/L (ref 96–112)
CO2 SERPL-SCNC: 19 MMOL/L (ref 20–33)
CREAT SERPL-MCNC: 1.54 MG/DL (ref 0.5–1.4)
DACRYOCYTES BLD QL SMEAR: NORMAL
EOSINOPHIL # BLD AUTO: 0 K/UL (ref 0–0.51)
EOSINOPHIL NFR BLD: 0 % (ref 0–6.9)
ERYTHROCYTE [DISTWIDTH] IN BLOOD BY AUTOMATED COUNT: 69.1 FL (ref 35.9–50)
GLOBULIN SER CALC-MCNC: 4.4 G/DL (ref 1.9–3.5)
GLUCOSE SERPL-MCNC: 251 MG/DL (ref 65–99)
HCO3 BLDA-SCNC: 22 MMOL/L (ref 17–25)
HCT VFR BLD AUTO: 31.1 % (ref 42–52)
HGB BLD-MCNC: 7.9 G/DL (ref 14–18)
HYPOCHROMIA BLD QL SMEAR: ABNORMAL
INHALED O2 FLOW RATE: 4 L/MIN (ref 2–10)
IRON SATN MFR SERPL: 4 % (ref 15–55)
IRON SERPL-MCNC: 14 UG/DL (ref 50–180)
LYMPHOCYTES # BLD AUTO: 0.33 K/UL (ref 1–4.8)
LYMPHOCYTES NFR BLD: 1.7 % (ref 22–41)
MAGNESIUM SERPL-MCNC: 2.3 MG/DL (ref 1.5–2.5)
MANUAL DIFF BLD: NORMAL
MCH RBC QN AUTO: 18.4 PG (ref 27–33)
MCHC RBC AUTO-ENTMCNC: 25.4 G/DL (ref 33.7–35.3)
MCV RBC AUTO: 72.3 FL (ref 81.4–97.8)
METAMYELOCYTES NFR BLD MANUAL: 0.9 %
MICROCYTES BLD QL SMEAR: ABNORMAL
MONOCYTES # BLD AUTO: 0 K/UL (ref 0–0.85)
MONOCYTES NFR BLD AUTO: 0 % (ref 0–13.4)
MORPHOLOGY BLD-IMP: NORMAL
NEUTROPHILS # BLD AUTO: 18.9 K/UL (ref 1.82–7.42)
NEUTROPHILS NFR BLD: 97.4 % (ref 44–72)
NRBC # BLD AUTO: 0 K/UL
NRBC BLD-RTO: 0 /100 WBC
OVALOCYTES BLD QL SMEAR: NORMAL
PCO2 BLDA: 39.4 MMHG (ref 26–37)
PH BLDA: 7.36 [PH] (ref 7.4–7.5)
PLATELET # BLD AUTO: 298 K/UL (ref 164–446)
PLATELET BLD QL SMEAR: NORMAL
PMV BLD AUTO: 10.2 FL (ref 9–12.9)
PO2 BLDA: 87.3 MMHG (ref 64–87)
POIKILOCYTOSIS BLD QL SMEAR: NORMAL
POLYCHROMASIA BLD QL SMEAR: NORMAL
POTASSIUM SERPL-SCNC: 4.4 MMOL/L (ref 3.6–5.5)
PROT SERPL-MCNC: 8.1 G/DL (ref 6–8.2)
RBC # BLD AUTO: 4.3 M/UL (ref 4.7–6.1)
RBC BLD AUTO: PRESENT
SAO2 % BLDA: 95.6 % (ref 93–99)
SCHISTOCYTES BLD QL SMEAR: NORMAL
SMUDGE CELLS BLD QL SMEAR: NORMAL
SODIUM SERPL-SCNC: 132 MMOL/L (ref 135–145)
TIBC SERPL-MCNC: 384 UG/DL (ref 250–450)
WBC # BLD AUTO: 19.4 K/UL (ref 4.8–10.8)

## 2018-10-28 PROCEDURE — 770020 HCHG ROOM/CARE - TELE (206)

## 2018-10-28 PROCEDURE — 700105 HCHG RX REV CODE 258: Performed by: HOSPITALIST

## 2018-10-28 PROCEDURE — 82803 BLOOD GASES ANY COMBINATION: CPT

## 2018-10-28 PROCEDURE — 80053 COMPREHEN METABOLIC PANEL: CPT

## 2018-10-28 PROCEDURE — 94669 MECHANICAL CHEST WALL OSCILL: CPT

## 2018-10-28 PROCEDURE — 94640 AIRWAY INHALATION TREATMENT: CPT

## 2018-10-28 PROCEDURE — 99407 BEHAV CHNG SMOKING > 10 MIN: CPT

## 2018-10-28 PROCEDURE — 700102 HCHG RX REV CODE 250 W/ 637 OVERRIDE(OP): Performed by: STUDENT IN AN ORGANIZED HEALTH CARE EDUCATION/TRAINING PROGRAM

## 2018-10-28 PROCEDURE — 700111 HCHG RX REV CODE 636 W/ 250 OVERRIDE (IP): Performed by: HOSPITALIST

## 2018-10-28 PROCEDURE — 94668 MNPJ CHEST WALL SBSQ: CPT

## 2018-10-28 PROCEDURE — 94667 MNPJ CHEST WALL 1ST: CPT

## 2018-10-28 PROCEDURE — A9270 NON-COVERED ITEM OR SERVICE: HCPCS | Performed by: STUDENT IN AN ORGANIZED HEALTH CARE EDUCATION/TRAINING PROGRAM

## 2018-10-28 PROCEDURE — 71045 X-RAY EXAM CHEST 1 VIEW: CPT

## 2018-10-28 PROCEDURE — 83036 HEMOGLOBIN GLYCOSYLATED A1C: CPT

## 2018-10-28 PROCEDURE — 85007 BL SMEAR W/DIFF WBC COUNT: CPT

## 2018-10-28 PROCEDURE — 83540 ASSAY OF IRON: CPT

## 2018-10-28 PROCEDURE — 83735 ASSAY OF MAGNESIUM: CPT

## 2018-10-28 PROCEDURE — 700101 HCHG RX REV CODE 250: Performed by: HOSPITALIST

## 2018-10-28 PROCEDURE — 700102 HCHG RX REV CODE 250 W/ 637 OVERRIDE(OP): Performed by: INTERNAL MEDICINE

## 2018-10-28 PROCEDURE — 99233 SBSQ HOSP IP/OBS HIGH 50: CPT | Mod: GC | Performed by: INTERNAL MEDICINE

## 2018-10-28 PROCEDURE — 83550 IRON BINDING TEST: CPT

## 2018-10-28 PROCEDURE — 85027 COMPLETE CBC AUTOMATED: CPT

## 2018-10-28 PROCEDURE — 700111 HCHG RX REV CODE 636 W/ 250 OVERRIDE (IP): Performed by: STUDENT IN AN ORGANIZED HEALTH CARE EDUCATION/TRAINING PROGRAM

## 2018-10-28 PROCEDURE — 700102 HCHG RX REV CODE 250 W/ 637 OVERRIDE(OP): Performed by: HOSPITALIST

## 2018-10-28 PROCEDURE — 700101 HCHG RX REV CODE 250: Performed by: SURGERY

## 2018-10-28 PROCEDURE — A9270 NON-COVERED ITEM OR SERVICE: HCPCS | Performed by: INTERNAL MEDICINE

## 2018-10-28 PROCEDURE — 83880 ASSAY OF NATRIURETIC PEPTIDE: CPT

## 2018-10-28 PROCEDURE — A9270 NON-COVERED ITEM OR SERVICE: HCPCS | Performed by: HOSPITALIST

## 2018-10-28 PROCEDURE — 36415 COLL VENOUS BLD VENIPUNCTURE: CPT

## 2018-10-28 RX ORDER — FUROSEMIDE 10 MG/ML
40 INJECTION INTRAMUSCULAR; INTRAVENOUS
Status: DISCONTINUED | OUTPATIENT
Start: 2018-10-28 | End: 2018-10-31 | Stop reason: HOSPADM

## 2018-10-28 RX ORDER — HEPARIN SODIUM 5000 [USP'U]/ML
5000 INJECTION, SOLUTION INTRAVENOUS; SUBCUTANEOUS EVERY 8 HOURS
Status: DISCONTINUED | OUTPATIENT
Start: 2018-10-28 | End: 2018-10-31 | Stop reason: HOSPADM

## 2018-10-28 RX ORDER — FUROSEMIDE 10 MG/ML
20 INJECTION INTRAMUSCULAR; INTRAVENOUS ONCE
Status: COMPLETED | OUTPATIENT
Start: 2018-10-28 | End: 2018-10-28

## 2018-10-28 RX ORDER — METHYLPREDNISOLONE SODIUM SUCCINATE 125 MG/2ML
125 INJECTION, POWDER, LYOPHILIZED, FOR SOLUTION INTRAMUSCULAR; INTRAVENOUS EVERY 8 HOURS
Status: DISCONTINUED | OUTPATIENT
Start: 2018-10-28 | End: 2018-10-29

## 2018-10-28 RX ORDER — BUDESONIDE AND FORMOTEROL FUMARATE DIHYDRATE 160; 4.5 UG/1; UG/1
2 AEROSOL RESPIRATORY (INHALATION)
Status: DISCONTINUED | OUTPATIENT
Start: 2018-10-28 | End: 2018-10-31 | Stop reason: HOSPADM

## 2018-10-28 RX ADMIN — IPRATROPIUM BROMIDE AND ALBUTEROL SULFATE 3 ML: .5; 3 SOLUTION RESPIRATORY (INHALATION) at 22:38

## 2018-10-28 RX ADMIN — MORPHINE SULFATE 2 MG: 4 INJECTION INTRAVENOUS at 01:22

## 2018-10-28 RX ADMIN — METHYLPREDNISOLONE SODIUM SUCCINATE 125 MG: 125 INJECTION, POWDER, FOR SOLUTION INTRAMUSCULAR; INTRAVENOUS at 20:58

## 2018-10-28 RX ADMIN — ACETAMINOPHEN 1000 MG: 500 TABLET, FILM COATED ORAL at 14:32

## 2018-10-28 RX ADMIN — IPRATROPIUM BROMIDE AND ALBUTEROL SULFATE 3 ML: .5; 3 SOLUTION RESPIRATORY (INHALATION) at 18:45

## 2018-10-28 RX ADMIN — ROSUVASTATIN CALCIUM 40 MG: 20 TABLET, FILM COATED ORAL at 06:34

## 2018-10-28 RX ADMIN — AMIODARONE HYDROCHLORIDE 200 MG: 200 TABLET ORAL at 06:34

## 2018-10-28 RX ADMIN — DOXYCYCLINE 100 MG: 100 TABLET ORAL at 08:58

## 2018-10-28 RX ADMIN — CEFTRIAXONE SODIUM 2 G: 2 INJECTION, POWDER, FOR SOLUTION INTRAMUSCULAR; INTRAVENOUS at 09:45

## 2018-10-28 RX ADMIN — IPRATROPIUM BROMIDE AND ALBUTEROL SULFATE 3 ML: .5; 3 SOLUTION RESPIRATORY (INHALATION) at 14:41

## 2018-10-28 RX ADMIN — HEPARIN SODIUM 5000 UNITS: 5000 INJECTION, SOLUTION INTRAVENOUS; SUBCUTANEOUS at 20:58

## 2018-10-28 RX ADMIN — IPRATROPIUM BROMIDE AND ALBUTEROL SULFATE 3 ML: .5; 3 SOLUTION RESPIRATORY (INHALATION) at 10:36

## 2018-10-28 RX ADMIN — TRAMADOL HYDROCHLORIDE 100 MG: 50 TABLET, FILM COATED ORAL at 12:16

## 2018-10-28 RX ADMIN — FERROUS SULFATE TAB 325 MG (65 MG ELEMENTAL FE) 325 MG: 325 (65 FE) TAB at 06:35

## 2018-10-28 RX ADMIN — HEPARIN SODIUM 5000 UNITS: 5000 INJECTION, SOLUTION INTRAVENOUS; SUBCUTANEOUS at 14:00

## 2018-10-28 RX ADMIN — TRAMADOL HYDROCHLORIDE 100 MG: 50 TABLET, FILM COATED ORAL at 17:33

## 2018-10-28 RX ADMIN — METOPROLOL SUCCINATE 25 MG: 25 TABLET, EXTENDED RELEASE ORAL at 06:36

## 2018-10-28 RX ADMIN — ACETAMINOPHEN 1000 MG: 500 TABLET, FILM COATED ORAL at 06:35

## 2018-10-28 RX ADMIN — TRAMADOL HYDROCHLORIDE 100 MG: 50 TABLET, FILM COATED ORAL at 06:36

## 2018-10-28 RX ADMIN — PREDNISONE 50 MG: 50 TABLET ORAL at 06:35

## 2018-10-28 RX ADMIN — ACETAMINOPHEN 1000 MG: 500 TABLET, FILM COATED ORAL at 20:57

## 2018-10-28 RX ADMIN — HEPARIN SODIUM 5000 UNITS: 5000 INJECTION, SOLUTION INTRAVENOUS; SUBCUTANEOUS at 09:45

## 2018-10-28 RX ADMIN — BUDESONIDE AND FORMOTEROL FUMARATE DIHYDRATE 2 PUFF: 160; 4.5 AEROSOL RESPIRATORY (INHALATION) at 17:33

## 2018-10-28 RX ADMIN — FUROSEMIDE 20 MG: 10 INJECTION, SOLUTION INTRAMUSCULAR; INTRAVENOUS at 09:14

## 2018-10-28 RX ADMIN — IPRATROPIUM BROMIDE AND ALBUTEROL SULFATE 3 ML: .5; 3 SOLUTION RESPIRATORY (INHALATION) at 02:10

## 2018-10-28 RX ADMIN — LIDOCAINE 2 PATCH: 50 PATCH TOPICAL at 17:34

## 2018-10-28 RX ADMIN — MORPHINE SULFATE 2 MG: 4 INJECTION INTRAVENOUS at 08:58

## 2018-10-28 RX ADMIN — METHYLPREDNISOLONE SODIUM SUCCINATE 125 MG: 125 INJECTION, POWDER, FOR SOLUTION INTRAMUSCULAR; INTRAVENOUS at 09:45

## 2018-10-28 RX ADMIN — FUROSEMIDE 20 MG: 10 INJECTION, SOLUTION INTRAMUSCULAR; INTRAVENOUS at 09:45

## 2018-10-28 RX ADMIN — SPIRONOLACTONE 25 MG: 25 TABLET ORAL at 06:36

## 2018-10-28 RX ADMIN — GUAIFENESIN AND DEXTROMETHORPHAN 10 ML: 100; 10 SYRUP ORAL at 06:40

## 2018-10-28 RX ADMIN — FUROSEMIDE 40 MG: 10 INJECTION, SOLUTION INTRAMUSCULAR; INTRAVENOUS at 17:33

## 2018-10-28 RX ADMIN — DOXYCYCLINE 100 MG: 100 TABLET ORAL at 20:57

## 2018-10-28 RX ADMIN — IPRATROPIUM BROMIDE AND ALBUTEROL SULFATE 3 ML: .5; 3 SOLUTION RESPIRATORY (INHALATION) at 06:49

## 2018-10-28 ASSESSMENT — ENCOUNTER SYMPTOMS
CHILLS: 0
HEADACHES: 0
BLURRED VISION: 1
DIZZINESS: 0
WHEEZING: 1
MYALGIAS: 0
ABDOMINAL PAIN: 0
BLURRED VISION: 0
DEPRESSION: 0
VOMITING: 0
MYALGIAS: 1
SHORTNESS OF BREATH: 1
ORTHOPNEA: 1
SPUTUM PRODUCTION: 1
FEVER: 0
NAUSEA: 0
COUGH: 1
SPEECH CHANGE: 0

## 2018-10-28 ASSESSMENT — PAIN SCALES - GENERAL
PAINLEVEL_OUTOF10: 6
PAINLEVEL_OUTOF10: 5
PAINLEVEL_OUTOF10: 9
PAINLEVEL_OUTOF10: 5
PAINLEVEL_OUTOF10: 6

## 2018-10-28 NOTE — RESPIRATORY CARE
COPD EDUCATION by COPD CLINICAL EDUCATOR  10/28/2018 at 12:57 PM by Dalia Chadwick     Patient interviewed by COPD education team. Patient refused COPD program at this time.  He states he has our information from previous admissions.  He currently smokes. Offered our information about our smoking cessation classes. He accepted but not intending to quit at this time.

## 2018-10-28 NOTE — PROGRESS NOTES
2 RN skin check completed with MIN Del Toro:    Generalized skin intact.  Bilat ears red and blanching (foam o2 pads applied).  Bilat elbows red and blanching.  Abrasion to R posterior forearm (new gauze and new abd pad applied, CDI).  Bilat discoloration to lower extremities.  R 5th toe with abrasion (dressing CDI).  Bilat heels and feet calloused and blanching.  Sacral area red and blanching.

## 2018-10-28 NOTE — NON-PROVIDER
Internal Medicine Medical Student Note  Note Author: Eugenio Valdes, Student    Name Joshua Medrano     1965   Age/Sex 52 y.o. male   MRN [id number]   Code Status Full       Reason for interval visit  (Principal Problem)   Acute on chronic respiratory failure (HCC)    Interval Problem Daily Status Update  (problem status, last 24 hours, new history, new data )   No acute overnight events. Patient reports that he slept well; his SOB remains relatively unchanged, and he reports his rib pain as 9/10 currently. Morning labs notable for increased creatinine, elevated BNP, elevated WBC's, decreased hemoglobin, and increased anion gap. Night team held morning dose of IV lasix. Serial CXRs stable.       Physical Exam     Vitals:    10/28/18 0000 10/28/18 0210 10/28/18 0405 10/28/18 0650   BP: 124/70  137/84    Pulse: 78 72 72 70   Resp: 20 18 19 17   Temp: 36.5 °C (97.7 °F)  37 °C (98.6 °F)    SpO2: 93% 94% 96% 95%   Weight:       Height:         Body mass index is 46.98 kg/m². Weight: (!) 179.7 kg (396 lb 2.7 oz)  Oxygen Therapy:  Pulse Oximetry: 95 %, O2 (LPM): 3, O2 Delivery: Silicone Nasal Cannula    Physical Exam   Constitutional: He is oriented to person, place, and time. He appears unhealthy. No distress.   HENT:   Head: Normocephalic and atraumatic.   Eyes: EOM are normal.   Neck: Normal range of motion.   Cardiovascular: Normal rate, regular rhythm and intact distal pulses.  Exam reveals no friction rub.    Murmur heard.   Systolic murmur is present with a grade of 2/6   Pulmonary/Chest: No tachypnea. He has wheezes. He has no rales. He exhibits tenderness.   Diffuse wheezes throughout.    Abdominal: Soft. Bowel sounds are normal. He exhibits distension. There is no tenderness. There is no rebound and no guarding.   Neurological: He is alert and oriented to person, place, and time. GCS score is 15.   Skin: Skin is warm and dry.   Psychiatric: Mood and affect normal.       Labs/Imaging     Lab Results    Component Value Date/Time    SODIUM 132 (L) 10/28/2018 12:39 AM    POTASSIUM 4.4 10/28/2018 12:39 AM    CHLORIDE 101 10/28/2018 12:39 AM    CO2 19 (L) 10/28/2018 12:39 AM    BUN 21 10/28/2018 12:39 AM    CREATININE 1.54 (H) 10/28/2018 12:39 AM    GLUCOSE 251 (H) 10/28/2018 12:39 AM     Lab Results   Component Value Date/Time    WBC 19.4 (H) 10/28/2018 12:39 AM    HEMOGLOBIN 7.9 (L) 10/28/2018 12:39 AM    HEMATOCRIT 31.1 (L) 10/28/2018 12:39 AM    PLATELETCT 298 10/28/2018 12:39 AM        Dx-chest-portable (1 View)    Result Date: 10/28/2018  10/28/2018 5:13 AM HISTORY/REASON FOR EXAM:  Shortness of Breath TECHNIQUE/EXAM DESCRIPTION AND NUMBER OF VIEWS: Single portable view of the chest. COMPARISON: Yesterday FINDINGS: The heart is enlarged. Diffuse interstitial opacities are again seen bilaterally, consistent with pulmonary edema. There is coexistent bibasilar atelectasis, along with a small right pleural effusion.     Grossly stable chest appearance compared with 10/27.        Assessment/Plan   Mr. Medrano is a 52 year old male with acute on chronic respiratory failure with hypoxia. The differential includes, but is not limited to: COPD exacerbation, CHFpEF exacerbation, and obstructive sleep apnea. He also suffered right rib fractures 2/2 ground-level fall. He has a complex medical history including COPD, CHF, CAD s/p CABG, paroxysmal atrial fibrillation, morbid obesity, DM2, anemia, HTN, HLD, and medical noncompliance.        1. Acute on Chronic Respiratory Failure with Hypoxia  - Two days of SOB, cough with sick contact  - Hx of COPD, CHF, mild intermittent asthma with multiple admissions for exacerbations of such  - Wheezes remain, O2 requirement remains increased, ranging from 3-5L overnight  - BNP up to 962 today  - CXR stable as compared to yesterday  - ABG: pH 7.36, pCO2 36.4, pO2 87.3     Plan:   - RT protocol  - Duoneb q4h scheduled  - Was Prednisone PO; on day 2/5 of steroids; switched to IV solu-medrol  125 q8h  - Doxycycline 100mg po bid and Ceftriaxone 2g qd   - Okay to resume diuresis; increased Lasix to 40mg IV bid  - Continuous pulse ox; titrate nasal O2 to maintain SpO2 88-92%     2. Rib Fractures  3. Pneumothorax  - 2/2 Ground level fall from EMS gurney  - XR yesterday showed acute fractures of the anterolateral aspects of the right fifth through eighth ribs and small right apical pneumothorax  - Pain limiting patients ability to breathe deeply  - CXR today stable as compared to yesterday; patient unable to have CT scan due to morbid obesity     Plan:   - Surgery consulted, appreciate their input  - Pain control:    - Lidocaine patch              - Tylenol 1g q8h   - Tramadol 50 q6h              - Morphine 2mg IV q3h prn for severe pain  - Encourage use of IS    4. Leukocytosis  - Chronic in nature  - WBC 19 today with neutrophilia  - Increase today like due to prednisone for COPD exacerbation; chronic nature likely secondary to stress  - Infection less likely but cannot be ruled out; patient afebrile and procalcitonin WNL  - Doxy and C3 as above     5. Anemia, microcytic  - Previous admissions for GI bleed from unclear origin - ?diverticular bleed  - Last admission this month diagnosed with iron-deficiency anemia  - Colonoscopy and EGD in July '18 revealed no source of the bleeding  - Iron 14, TIBC 384, %sat 6  - Continue Ferrous sulfate 325 daily        Chronic Issues:     6. Congestive Heart Failure with Preserved Ejection Fraction  - Last echo 10/10/18: LVEF 55%  - Lasix as above  - Continue home spironolactone 25 daily     7. Paroxysmal Atrial Fibrillation  - Anticoagulation was held a few months ago due to recent GI bleed  - Tele overnight: sinus rhythm   - Continue home amiodarone 200mg daily and metoprolol sr 25mg daily    8. CKD, stage III  - eGFR of 47 this am with BUN 21, creatinine 1.54  - Lasix as above  - Will continue to follow     11. Essential HTN  - BP 120s/70s overnight   - Continue home  metoprolol sr 25mg daily     12. Hyperlipidemia  - Lipids: total 77, trigs 72, HDL 23, LDL 40  - Continue home rosuvastatin 40 daily    12. DM2  -A1C 5.9 on 3/28/2018  - Glucose overnight 251 due to prednisone for COPD exacerbation  - Follow-up with PCP

## 2018-10-28 NOTE — PROGRESS NOTES
Internal Medicine Interval Note  Note Author: Leandro Weber M.D.     Name Joshua Medrano     1965   Age/Sex 52 y.o. male   MRN [id number]   Code Status Full      After 5PM or if no immediate response to page, please call for cross-coverage  Attending/Team: Margarita/Dr. Gilmore See Patient List for primary contact information  Call [phone] to page    1st Call - Day Intern (R1):   [place] 2nd Call - Day Sr. Resident (R2/R3):   Tia         Reason for interval visit  (Principal Problem)   COPD exacerbation  Acute exacerbation of CHF  Rib fractures  Pneumothorax      Interval Problem Daily Status Update  (24 hours, problem oriented, brief subjective history, new lab/imaging data pertinent to that problem)   Patient is feeling much better today.  Overnight his respiratory symptoms slightly deteriorated, however improved this morning.  ABG does not look bad enough to warrant ICU transfer.  Increase Lasix to 40 mg IV twice daily and added ceftriaxone and IV steroids to his medical regimen.  Chest x-ray does not show expansion of pneumothorax.  Monitor closely, if respiratory symptoms also worsen consider repeat ABG and a chest x-ray and ICU transfer for BiPAP.  Encourage incentive spirometry.    Review of Systems   Constitutional: Negative for chills and fever.   HENT: Negative for congestion.    Eyes: Negative for blurred vision.   Respiratory: Positive for cough, shortness of breath and wheezing.    Cardiovascular: Positive for orthopnea and leg swelling. Negative for chest pain.   Gastrointestinal: Negative for abdominal pain, nausea and vomiting.   Genitourinary: Negative for dysuria.   Musculoskeletal: Negative for myalgias.   Neurological: Negative for dizziness.   Psychiatric/Behavioral: Negative for depression.       Disposition/Barriers to discharge:   Inpatient, treatment for pain control due to refracture, CHF exacerbation    Consultants/Specialty  None  PCP: Nam Le M.D.      Quality  Measures  Quality-Core Measures   Reviewed items::  Labs reviewed, Radiology images reviewed and Medications reviewed  Ceballos catheter::  No Ceballos  DVT prophylaxis pharmacological::  Heparin          Physical Exam       Vitals:    10/28/18 0405 10/28/18 0650 10/28/18 0840 10/28/18 1036   BP: 137/84  111/69    Pulse: 72 70 80 77   Resp: 19 17 19 16   Temp: 37 °C (98.6 °F)  37.1 °C (98.8 °F)    SpO2: 96% 95% 96% 97%   Weight:       Height:         Body mass index is 46.98 kg/m². Weight: (!) 179.7 kg (396 lb 2.7 oz)  Oxygen Therapy:  Pulse Oximetry: 97 %, O2 (LPM): 2.5, O2 Delivery: Silicone Nasal Cannula    Physical Exam   Constitutional: He is oriented to person, place, and time. He appears not dehydrated. He appears healthy.  Non-toxic appearance. He has a sickly appearance. He appears distressed.   Appears to be in mild respiratory distress   Eyes: Conjunctivae are normal. No scleral icterus.   Neck: No tracheal deviation present. No thyromegaly present.   Cardiovascular: Normal rate, regular rhythm and normal heart sounds.    No murmur heard.  Pulmonary/Chest: Effort normal. He has wheezes.   Faint inspiratory crackles heard bilaterally   Abdominal: Soft. Bowel sounds are normal. There is no tenderness.   Obese abdomen   Musculoskeletal: He exhibits edema.   2+ bilateral lower extremity edema   Neurological: He is alert and oriented to person, place, and time. No cranial nerve deficit.   Skin: Skin is warm and dry. He is not diaphoretic.   Psychiatric: Affect normal.             Assessment/Plan     * Acute on chronic respiratory failure (HCC)- (present on admission)   Assessment & Plan    Pt presented with SOB that has gotten progressively worse in the last 24 hours. Differential include COPD exacerbation vs CHF with Preserved EF vs SHASHANK. Pt is on home O2 of 2L but he was still SOB, requiring 4L NC. Patient reports orthopnea, paroxysmal nocturnal dyspnea. Has a diagnosis of SHASHANK but not on CPAP. Pro-Giovany within normal  limits. Overnight, he became slightly more tachypnic and SOB. CXR did not show any worsening of pneumothorax, stable pulmonary edema.  ABG consistent with borderline PCO2 of 40.  BNP increased to 900s.  At this time we think patient can be managed on the floor with supplemental oxygen.      Plan  Increase Lasix to 40 IV twice daily, monitor BUN/creatinine  Continues scheduled duo nebs  Changed to IV steroids  Respiratory therapy per protocol  Add ceftriaxone to doxycycline since underlying pneumonia is possible and also significant rise in Leucocytes.   Monitor closely              Pneumothorax- (present on admission)   Assessment & Plan    Pt suffered from Rib fracture 5-8  -Apical pneumothorax on CXR  -trachea midline  -PE limited by body habitus and pain. Wheezing present  - Pt is tachypnic     Plan  Continue O2  CT chest w/o contrast  Tramadol Q6  Morphine PRN  RT per protocal  Incentive spirometry           Closed fracture of multiple ribs of right side- (present on admission)   Assessment & Plan    Pt suffered from fall from [facility]  X-ray showed multiple closed rib fractures from 5-8 with Apical pneumothorax   Pt reports pain at R. Lower chest. No erythema or bruising  Trauma surgery is on board  Repeated x-rays did not show expansion of the pneumothorax  CT chest could not be done due to body habitus  Pain is under control with scheduled acetaminophen, tramadol and as needed morphine  Patient has been encouraged multiple times to use incentive spirometry.  If there is a concern for worsening respiratory distress, we will repeat chest x-ray, and if there is no pneumothorax consider patient for BiPAP therapy in ICU                Heart failure with preserved ejection fraction, borderline, class IV (HCC)- (present on admission)   Assessment & Plan    Prior echo with EF of 55%.  With elevated BNP, but not edema on chest x-ray, bilateral lower extremity edema, and worsening shortness of breath appears to be  that patient might be in exacerbation of heart failure with preserved ejection fraction and cor pulmonale at the same time.  Gentle diuresis as mentioned elsewhere.          Chronic obstructive pulmonary disease with acute exacerbation (HCC)- (present on admission)   Assessment & Plan    Pt is a current smoker with 30 Pack smoking hx with home O2 on 2L NC  Pt presented with SOB exacerbation secondary to possible infection vs CHF  Procal WNL  Elevated WBC, however this is chronic for pt    Plan  See section on acute hypoxic respiratory failure        Morbid obesity with BMI of 40.0-44.9, adult ([facility])- (present on admission)   Assessment & Plan    BMI of 43.74  Pt education at discharge          Microcytic anemia- (present on admission)   Assessment & Plan    Pt has hx of Microcytic anemia  Pt's baseline Hgb is around 7  Hx of GI blood loss  Low iron at admission  Hx of Intermittent Hematochezia and Melena in the last 3-6 months    Ferrous Sulfate  CTF  Occult blood pending            SHASHANK (obstructive sleep apnea)- (present on admission)   Assessment & Plan    Pt is morbidly obese with Mallampatti score of 3-4  Pt reports orthopnea and PND  Pt on home O2 of 2L NC requiring 4L in hospital  Out pt Sleep Study  F/U with PCP        CAD (coronary artery disease)- (present on admission)   Assessment & Plan    Hx of Ischemic heart disease with bypass surgery in 2017    Home Amiodarone  Home Metoprolol SR  Home Spironolactone          Acute exacerbation of CHF (congestive heart failure) ([facility])- (present on admission)   Assessment & Plan    Inability to carry any physical activity  Pt has elevated BNP of 593 on 10/27, increased to 900s today. possible Cor Pulmoary with HFpEF  B/L LE edema, distended abdomen, CXR consistent with Pulmonary edema    Plan:  Increase lasix to 40 bid  Monitor kidney function              Forearm abrasion, right, initial encounter- (present on admission)   Assessment & Plan    During transport to ED  patient unfortunately fell along with the gurney causing right forearm abrasions.  -Edema without fractures of the right forearm    Wound care  Pain control with tramadol 100 mg every 6  As needed morphine 2 mg        Dyslipidemia- (present on admission)   Assessment & Plan    Pt morbidly obese with hx of Dyslipidemia    Lipid panel ordered          CKD (chronic kidney disease), stage III (HCC)- (present on admission)   Assessment & Plan    GFR of 50, Cr 1.46 and BUN of 19  Hx of CKD    Gentle diuresis with Lasix  ctf          Cigarette nicotine dependence with nicotine-induced disorder- (present on admission)   Assessment & Plan    Pt is is current smoker of 1-2 cigarrets/day with 30 pack smoking history  Pt reports he will stop smoking after this hospital stay  Will discuss with pt at time of discharge    Nicotine patch        Paroxysmal atrial fibrillation (HCC)- (present on admission)   Assessment & Plan    Pt has hx of paraxysmal A-fib  Anti-Coagulation was held due to trauma also recent GI bleed in last 2-5 months  Pt is in Sinus with normal HR during this hospital stay  Follow-up with PCP   We will start the patient on DVT prophylaxis, PCP to consider starting anticoagulation.        Type 2 diabetes mellitus with complication, without long-term current use of insulin (HCC)- (present on admission)   Assessment & Plan    Pt is a morbidly obese male with BMI of 43.74    HgbA1C 5.9 on 3/28/2018  Lipid panel ordered  POC glucose of 122

## 2018-10-28 NOTE — CARE PLAN
Problem: Oxygenation:  Goal: Maintain adequate oxygenation dependent on patient condition  Outcome: PROGRESSING AS EXPECTED    Intervention: Manage oxygen therapy by monitoring pulse oximetry and/or ABG values  Pt on 2LPM NC; sating 94%; RA is baseline; will continue to monitor and titrate as needed      Problem: Bronchoconstriction:  Goal: Improve in air movement and diminished wheezing  Outcome: PROGRESSING AS EXPECTED    Intervention: Implement inhaled treatments  Pt getting DUO Q4 per COPD ex protocol; expiratory wheezing throughout the day;now becoming clear; will order pts inhalers (Alb, Sym)      Problem: Hyperinflation:  Goal: Prevent or improve atelectasis  Outcome: PROGRESSING AS EXPECTED    Intervention: Instruct incentive spirometry usage  Per protocol PEP QID for rib frx; pulling 1000 on IS; participating well

## 2018-10-28 NOTE — PROGRESS NOTES
Trauma / Surgical Daily Progress Note    Date of Service  10/28/2018    Chief Complaint  52 y.o. male admitted 10/27/2018 with Right rib fracture    Interval Events    Admitted by Medicine   Significant pre-hospital comorbities   Rib fractures / pneumothorax  Tertiary exam / SBIRT completed    - Continue multimodal pain management and aggressive pulmonary hygiene.  - Trend CXR imaging. Chest tube not required at this time.  - Counseled        Review of Systems  Review of Systems   Constitutional: Negative for chills and fever.   Eyes: Positive for blurred vision.   Respiratory: Positive for cough, sputum production and shortness of breath.         Home oxygen use   Cardiovascular: Positive for orthopnea and leg swelling. Negative for chest pain.   Gastrointestinal: Negative for abdominal pain, nausea and vomiting.   Musculoskeletal: Positive for myalgias.        Rib fracture pain   Skin: Negative for rash.   Neurological: Negative for speech change and headaches.        Vital Signs  Temp:  [35.6 °C (96.1 °F)-37.1 °C (98.8 °F)] 37.1 °C (98.8 °F)  Pulse:  [66-80] 77  Resp:  [16-26] 16  BP: (111-137)/(69-84) 111/69    Physical Exam  Physical Exam   Constitutional: He is oriented to person, place, and time. He is cooperative. He appears ill. Nasal cannula in place.   HENT:   Head: Atraumatic.   Eyes: Conjunctivae are normal.   Neck: No JVD present.   Cardiovascular: Normal rate.    Pulmonary/Chest: He has wheezes. He exhibits tenderness (Rib fractrure pain).   Abdominal:   Obese, nontender   Musculoskeletal: He exhibits edema (Bilateral lower extremity edema).   Neurological: He is alert and oriented to person, place, and time.   Skin: Skin is warm.   Bilateral lower extremity discoloration   Nursing note and vitals reviewed.      Laboratory  Recent Results (from the past 24 hour(s))   LACTIC ACID    Collection Time: 10/27/18  4:41 PM   Result Value Ref Range    Lactic Acid 1.4 0.5 - 2.0 mmol/L   LIPID PROFILE     Collection Time: 10/27/18  4:41 PM   Result Value Ref Range    Cholesterol,Tot 77 (L) 100 - 199 mg/dL    Triglycerides 72 0 - 149 mg/dL    HDL 23 (A) >=40 mg/dL    LDL 40 <100 mg/dL   CBC with Differential    Collection Time: 10/28/18 12:39 AM   Result Value Ref Range    WBC 19.4 (H) 4.8 - 10.8 K/uL    RBC 4.30 (L) 4.70 - 6.10 M/uL    Hemoglobin 7.9 (L) 14.0 - 18.0 g/dL    Hematocrit 31.1 (L) 42.0 - 52.0 %    MCV 72.3 (L) 81.4 - 97.8 fL    MCH 18.4 (L) 27.0 - 33.0 pg    MCHC 25.4 (L) 33.7 - 35.3 g/dL    RDW 69.1 (H) 35.9 - 50.0 fL    Platelet Count 298 164 - 446 K/uL    MPV 10.2 9.0 - 12.9 fL    Nucleated RBC 0.00 /100 WBC    NRBC (Absolute) 0.00 K/uL    Neutrophils-Polys 97.40 (H) 44.00 - 72.00 %    Lymphocytes 1.70 (L) 22.00 - 41.00 %    Monocytes 0.00 0.00 - 13.40 %    Eosinophils 0.00 0.00 - 6.90 %    Basophils 0.00 0.00 - 1.80 %    Neutrophils (Absolute) 18.90 (H) 1.82 - 7.42 K/uL    Lymphs (Absolute) 0.33 (L) 1.00 - 4.80 K/uL    Monos (Absolute) 0.00 0.00 - 0.85 K/uL    Eos (Absolute) 0.00 0.00 - 0.51 K/uL    Baso (Absolute) 0.00 0.00 - 0.12 K/uL    Hypochromia 2+     Anisocytosis 3+     Microcytosis 2+    Comp Metabolic Panel (CMP)    Collection Time: 10/28/18 12:39 AM   Result Value Ref Range    Sodium 132 (L) 135 - 145 mmol/L    Potassium 4.4 3.6 - 5.5 mmol/L    Chloride 101 96 - 112 mmol/L    Co2 19 (L) 20 - 33 mmol/L    Anion Gap 12.0 (H) 0.0 - 11.9    Glucose 251 (H) 65 - 99 mg/dL    Bun 21 8 - 22 mg/dL    Creatinine 1.54 (H) 0.50 - 1.40 mg/dL    Calcium 9.4 8.5 - 10.5 mg/dL    AST(SGOT) 21 12 - 45 U/L    ALT(SGPT) 11 2 - 50 U/L    Alkaline Phosphatase 113 (H) 30 - 99 U/L    Total Bilirubin 1.1 0.1 - 1.5 mg/dL    Albumin 3.7 3.2 - 4.9 g/dL    Total Protein 8.1 6.0 - 8.2 g/dL    Globulin 4.4 (H) 1.9 - 3.5 g/dL    A-G Ratio 0.8 g/dL   Magnesium    Collection Time: 10/28/18 12:39 AM   Result Value Ref Range    Magnesium 2.3 1.5 - 2.5 mg/dL   IRON/TOTAL IRON BIND    Collection Time: 10/28/18 12:39 AM    Result Value Ref Range    Iron 14 (L) 50 - 180 ug/dL    Total Iron Binding 384 250 - 450 ug/dL    % Saturation 4 (L) 15 - 55 %   BTYPE NATRIURETIC PEPTIDE    Collection Time: 10/28/18 12:39 AM   Result Value Ref Range    B Natriuretic Peptide 962 (H) 0 - 100 pg/mL   ESTIMATED GFR    Collection Time: 10/28/18 12:39 AM   Result Value Ref Range    GFR If  57 (A) >60 mL/min/1.73 m 2    GFR If Non  47 (A) >60 mL/min/1.73 m 2   DIFFERENTIAL MANUAL    Collection Time: 10/28/18 12:39 AM   Result Value Ref Range    Metamyelocytes 0.90 %    Manual Diff Status PERFORMED    PERIPHERAL SMEAR REVIEW    Collection Time: 10/28/18 12:39 AM   Result Value Ref Range    Peripheral Smear Review see below    PLATELET ESTIMATE    Collection Time: 10/28/18 12:39 AM   Result Value Ref Range    Plt Estimation Normal    MORPHOLOGY    Collection Time: 10/28/18 12:39 AM   Result Value Ref Range    RBC Morphology Present     Polychromia 1+     Poikilocytosis 2+     Ovalocytes 2+     Schistocytes 1+     Tear Drop Cells 1+     Smudge Cells Few    ABG - LAB    Collection Time: 10/28/18 10:23 AM   Result Value Ref Range    Ph 7.36 (L) 7.40 - 7.50    Pco2 39.4 (H) 26.0 - 37.0 mmHg    Po2 87.3 (H) 64.0 - 87.0 mmHg    O2 Saturation 95.6 93.0 - 99.0 %    Hco3 22 17 - 25 mmol/L    Base Excess -4 -4 - 3 mmol/L    Body Temp see below Centigrade    O2 Therapy 4.0 2.0 - 10.0 L/min       Fluids    Intake/Output Summary (Last 24 hours) at 10/28/18 1259  Last data filed at 10/28/18 1044   Gross per 24 hour   Intake              240 ml   Output             2130 ml   Net            -1890 ml       Core Measures & Quality Metrics  Labs reviewed, Medications reviewed and Radiology images reviewed  Ceballos catheter: No Ceballos      DVT Prophylaxis: Heparin  DVT prophylaxis - mechanical: SCDs  Ulcer prophylaxis: Not indicated  Antibiotics: Treating active infection/contamination beyond 24 hours perioperative coverage  Assessed for rehab:  Patient returned to prior level of function, rehabilitation not indicated at this time    Total Score: 4    ETOH Screening     Intervention complete date: 10/28/2018  Patient response to intervention: Denies illicit/presciption drug and alcohol abuse. Intervention not indicated .         Assessment/Plan  Pneumothorax- (present on admission)   Assessment & Plan    10/27 CXR imaging with possible small right apical pneumothorax   10/28 CXR with grossly stable chest appearance compared with 10/27.  Trend CXR imaging        Closed fracture of multiple ribs of right side- (present on admission)   Assessment & Plan    10/27 Rib series with right fifth through eighth ribs, and there is minimal subcutaneous air in that area.   There is minimal extrapleural density along the right lateral chest wall, consistent with hemorrhage.  Attempted CT chest - too large to fit in scanner  Blunt chest protocol. Aggressive pulmonary hygiene and pain management.        Morbid obesity with BMI of 40.0-44.9, adult (HCC)- (present on admission)   Assessment & Plan    BMI 41.5          Discussed patient condition with Patient and trauma surgery, Dr. Stalin Richter.    Patient seen, data reviewed and discussed.  Agree with assessment and plan.  EDYTA

## 2018-10-28 NOTE — PROGRESS NOTES
Notified [facility] hospitalist (Dr. Aragon) of pt's increased creatinine, elevated BNP, elevated WBC's, decreased hemoglobin, and increased anion gap. Per Dr. Aragon, hold pt's IV Lasix this AM, and he will further discuss the plan of care with the day shift [facility] hospitalist team. No other new orders received. Pt is resting comfortably in bed.

## 2018-10-28 NOTE — PROGRESS NOTES
patient is too large to scan. patient does not fit into the scanner with his injuries and large size after multiple attempts. ok to cancel per RAD for now. Please reorder if anything changes.     -Evgeny ULLOA

## 2018-10-29 ENCOUNTER — APPOINTMENT (OUTPATIENT)
Dept: RADIOLOGY | Facility: MEDICAL CENTER | Age: 53
DRG: 291 | End: 2018-10-29
Attending: SURGERY
Payer: OTHER MISCELLANEOUS

## 2018-10-29 LAB
ALBUMIN SERPL BCP-MCNC: 3.8 G/DL (ref 3.2–4.9)
ALBUMIN/GLOB SERPL: 0.9 G/DL
ALP SERPL-CCNC: 93 U/L (ref 30–99)
ALT SERPL-CCNC: 9 U/L (ref 2–50)
ANION GAP SERPL CALC-SCNC: 10 MMOL/L (ref 0–11.9)
AST SERPL-CCNC: 15 U/L (ref 12–45)
BASOPHILS # BLD AUTO: 0.2 % (ref 0–1.8)
BASOPHILS # BLD: 0.04 K/UL (ref 0–0.12)
BILIRUB SERPL-MCNC: 0.8 MG/DL (ref 0.1–1.5)
BNP SERPL-MCNC: 1146 PG/ML (ref 0–100)
BUN SERPL-MCNC: 30 MG/DL (ref 8–22)
CALCIUM SERPL-MCNC: 9.3 MG/DL (ref 8.5–10.5)
CHLORIDE SERPL-SCNC: 97 MMOL/L (ref 96–112)
CO2 SERPL-SCNC: 23 MMOL/L (ref 20–33)
CREAT SERPL-MCNC: 1.6 MG/DL (ref 0.5–1.4)
EKG IMPRESSION: NORMAL
EOSINOPHIL # BLD AUTO: 0.01 K/UL (ref 0–0.51)
EOSINOPHIL NFR BLD: 0 % (ref 0–6.9)
ERYTHROCYTE [DISTWIDTH] IN BLOOD BY AUTOMATED COUNT: 65.3 FL (ref 35.9–50)
EST. AVERAGE GLUCOSE BLD GHB EST-MCNC: 114 MG/DL
GLOBULIN SER CALC-MCNC: 4.4 G/DL (ref 1.9–3.5)
GLUCOSE SERPL-MCNC: 212 MG/DL (ref 65–99)
HBA1C MFR BLD: 5.6 % (ref 0–5.6)
HCT VFR BLD AUTO: 28.4 % (ref 42–52)
HGB BLD-MCNC: 7.2 G/DL (ref 14–18)
IMM GRANULOCYTES # BLD AUTO: 0.32 K/UL (ref 0–0.11)
IMM GRANULOCYTES NFR BLD AUTO: 1.6 % (ref 0–0.9)
INR PPP: 1.39 (ref 0.87–1.13)
LYMPHOCYTES # BLD AUTO: 0.37 K/UL (ref 1–4.8)
LYMPHOCYTES NFR BLD: 1.8 % (ref 22–41)
MAGNESIUM SERPL-MCNC: 2.1 MG/DL (ref 1.5–2.5)
MCH RBC QN AUTO: 18 PG (ref 27–33)
MCHC RBC AUTO-ENTMCNC: 25.4 G/DL (ref 33.7–35.3)
MCV RBC AUTO: 71.2 FL (ref 81.4–97.8)
MONOCYTES # BLD AUTO: 0.57 K/UL (ref 0–0.85)
MONOCYTES NFR BLD AUTO: 2.8 % (ref 0–13.4)
NEUTROPHILS # BLD AUTO: 19 K/UL (ref 1.82–7.42)
NEUTROPHILS NFR BLD: 93.6 % (ref 44–72)
NRBC # BLD AUTO: 0.02 K/UL
NRBC BLD-RTO: 0.1 /100 WBC
PLATELET # BLD AUTO: 237 K/UL (ref 164–446)
PMV BLD AUTO: 10 FL (ref 9–12.9)
POTASSIUM SERPL-SCNC: 4.8 MMOL/L (ref 3.6–5.5)
PROCALCITONIN SERPL-MCNC: 0.15 NG/ML
PROT SERPL-MCNC: 8.2 G/DL (ref 6–8.2)
PROTHROMBIN TIME: 17.1 SEC (ref 12–14.6)
RBC # BLD AUTO: 3.99 M/UL (ref 4.7–6.1)
SODIUM SERPL-SCNC: 130 MMOL/L (ref 135–145)
WBC # BLD AUTO: 20.3 K/UL (ref 4.8–10.8)

## 2018-10-29 PROCEDURE — 99233 SBSQ HOSP IP/OBS HIGH 50: CPT | Mod: GC | Performed by: INTERNAL MEDICINE

## 2018-10-29 PROCEDURE — 700101 HCHG RX REV CODE 250: Performed by: HOSPITALIST

## 2018-10-29 PROCEDURE — 700101 HCHG RX REV CODE 250: Performed by: SURGERY

## 2018-10-29 PROCEDURE — 84145 PROCALCITONIN (PCT): CPT

## 2018-10-29 PROCEDURE — 700102 HCHG RX REV CODE 250 W/ 637 OVERRIDE(OP): Performed by: STUDENT IN AN ORGANIZED HEALTH CARE EDUCATION/TRAINING PROGRAM

## 2018-10-29 PROCEDURE — 36415 COLL VENOUS BLD VENIPUNCTURE: CPT

## 2018-10-29 PROCEDURE — 700111 HCHG RX REV CODE 636 W/ 250 OVERRIDE (IP): Performed by: HOSPITALIST

## 2018-10-29 PROCEDURE — 700111 HCHG RX REV CODE 636 W/ 250 OVERRIDE (IP): Performed by: STUDENT IN AN ORGANIZED HEALTH CARE EDUCATION/TRAINING PROGRAM

## 2018-10-29 PROCEDURE — 700105 HCHG RX REV CODE 258: Performed by: HOSPITALIST

## 2018-10-29 PROCEDURE — A9270 NON-COVERED ITEM OR SERVICE: HCPCS | Performed by: STUDENT IN AN ORGANIZED HEALTH CARE EDUCATION/TRAINING PROGRAM

## 2018-10-29 PROCEDURE — 94760 N-INVAS EAR/PLS OXIMETRY 1: CPT

## 2018-10-29 PROCEDURE — 700102 HCHG RX REV CODE 250 W/ 637 OVERRIDE(OP): Performed by: HOSPITALIST

## 2018-10-29 PROCEDURE — 85610 PROTHROMBIN TIME: CPT

## 2018-10-29 PROCEDURE — 770020 HCHG ROOM/CARE - TELE (206)

## 2018-10-29 PROCEDURE — 83735 ASSAY OF MAGNESIUM: CPT

## 2018-10-29 PROCEDURE — A9270 NON-COVERED ITEM OR SERVICE: HCPCS | Performed by: HOSPITALIST

## 2018-10-29 PROCEDURE — 80053 COMPREHEN METABOLIC PANEL: CPT

## 2018-10-29 PROCEDURE — 700105 HCHG RX REV CODE 258

## 2018-10-29 PROCEDURE — A9270 NON-COVERED ITEM OR SERVICE: HCPCS | Performed by: INTERNAL MEDICINE

## 2018-10-29 PROCEDURE — 83880 ASSAY OF NATRIURETIC PEPTIDE: CPT

## 2018-10-29 PROCEDURE — 700102 HCHG RX REV CODE 250 W/ 637 OVERRIDE(OP): Performed by: INTERNAL MEDICINE

## 2018-10-29 PROCEDURE — 94640 AIRWAY INHALATION TREATMENT: CPT

## 2018-10-29 PROCEDURE — 71045 X-RAY EXAM CHEST 1 VIEW: CPT

## 2018-10-29 PROCEDURE — 94668 MNPJ CHEST WALL SBSQ: CPT

## 2018-10-29 PROCEDURE — 85025 COMPLETE CBC W/AUTO DIFF WBC: CPT

## 2018-10-29 RX ORDER — FUROSEMIDE 10 MG/ML
20 INJECTION INTRAMUSCULAR; INTRAVENOUS ONCE
Status: COMPLETED | OUTPATIENT
Start: 2018-10-29 | End: 2018-10-29

## 2018-10-29 RX ORDER — WARFARIN SODIUM 7.5 MG/1
7.5 TABLET ORAL
Status: DISCONTINUED | OUTPATIENT
Start: 2018-10-29 | End: 2018-10-31 | Stop reason: HOSPADM

## 2018-10-29 RX ORDER — SODIUM CHLORIDE 9 MG/ML
INJECTION, SOLUTION INTRAVENOUS
Status: COMPLETED
Start: 2018-10-29 | End: 2018-10-29

## 2018-10-29 RX ORDER — TRAMADOL HYDROCHLORIDE 50 MG/1
100 TABLET ORAL EVERY 8 HOURS
Status: DISCONTINUED | OUTPATIENT
Start: 2018-10-29 | End: 2018-10-31 | Stop reason: HOSPADM

## 2018-10-29 RX ORDER — ALBUTEROL SULFATE 90 UG/1
2 AEROSOL, METERED RESPIRATORY (INHALATION)
Status: DISCONTINUED | OUTPATIENT
Start: 2018-10-29 | End: 2018-10-31 | Stop reason: HOSPADM

## 2018-10-29 RX ORDER — IPRATROPIUM BROMIDE AND ALBUTEROL SULFATE 2.5; .5 MG/3ML; MG/3ML
3 SOLUTION RESPIRATORY (INHALATION)
Status: DISCONTINUED | OUTPATIENT
Start: 2018-10-30 | End: 2018-10-31 | Stop reason: HOSPADM

## 2018-10-29 RX ORDER — CALCIUM CARBONATE 500 MG/1
500 TABLET, CHEWABLE ORAL 2 TIMES DAILY PRN
Status: DISCONTINUED | OUTPATIENT
Start: 2018-10-29 | End: 2018-10-31 | Stop reason: HOSPADM

## 2018-10-29 RX ORDER — METHYLPREDNISOLONE SODIUM SUCCINATE 125 MG/2ML
125 INJECTION, POWDER, LYOPHILIZED, FOR SOLUTION INTRAMUSCULAR; INTRAVENOUS EVERY 12 HOURS
Status: DISCONTINUED | OUTPATIENT
Start: 2018-10-29 | End: 2018-10-30

## 2018-10-29 RX ADMIN — HEPARIN SODIUM 5000 UNITS: 5000 INJECTION, SOLUTION INTRAVENOUS; SUBCUTANEOUS at 22:10

## 2018-10-29 RX ADMIN — METHYLPREDNISOLONE SODIUM SUCCINATE 125 MG: 125 INJECTION, POWDER, FOR SOLUTION INTRAMUSCULAR; INTRAVENOUS at 05:53

## 2018-10-29 RX ADMIN — ALBUTEROL SULFATE 2 PUFF: 90 AEROSOL, METERED RESPIRATORY (INHALATION) at 20:10

## 2018-10-29 RX ADMIN — GUAIFENESIN AND DEXTROMETHORPHAN 10 ML: 100; 10 SYRUP ORAL at 01:19

## 2018-10-29 RX ADMIN — CEFTRIAXONE SODIUM 2 G: 2 INJECTION, POWDER, FOR SOLUTION INTRAMUSCULAR; INTRAVENOUS at 06:03

## 2018-10-29 RX ADMIN — GUAIFENESIN AND DEXTROMETHORPHAN 10 ML: 100; 10 SYRUP ORAL at 22:10

## 2018-10-29 RX ADMIN — TRAMADOL HYDROCHLORIDE 100 MG: 50 TABLET, FILM COATED ORAL at 22:10

## 2018-10-29 RX ADMIN — ALBUTEROL SULFATE 2 PUFF: 90 AEROSOL, METERED RESPIRATORY (INHALATION) at 16:20

## 2018-10-29 RX ADMIN — SODIUM CHLORIDE 500 ML: 9 INJECTION, SOLUTION INTRAVENOUS at 06:04

## 2018-10-29 RX ADMIN — ACETAMINOPHEN 1000 MG: 500 TABLET, FILM COATED ORAL at 05:45

## 2018-10-29 RX ADMIN — ROSUVASTATIN CALCIUM 40 MG: 20 TABLET, FILM COATED ORAL at 05:45

## 2018-10-29 RX ADMIN — BUDESONIDE AND FORMOTEROL FUMARATE DIHYDRATE 2 PUFF: 160; 4.5 AEROSOL RESPIRATORY (INHALATION) at 18:17

## 2018-10-29 RX ADMIN — DOXYCYCLINE 100 MG: 100 TABLET ORAL at 22:10

## 2018-10-29 RX ADMIN — ANTACID TABLETS 500 MG: 500 TABLET, CHEWABLE ORAL at 20:23

## 2018-10-29 RX ADMIN — TRAMADOL HYDROCHLORIDE 100 MG: 50 TABLET, FILM COATED ORAL at 05:45

## 2018-10-29 RX ADMIN — AMIODARONE HYDROCHLORIDE 200 MG: 200 TABLET ORAL at 05:43

## 2018-10-29 RX ADMIN — FUROSEMIDE 20 MG: 10 INJECTION, SOLUTION INTRAMUSCULAR; INTRAVENOUS at 08:08

## 2018-10-29 RX ADMIN — METOPROLOL SUCCINATE 25 MG: 25 TABLET, EXTENDED RELEASE ORAL at 05:44

## 2018-10-29 RX ADMIN — ACETAMINOPHEN 1000 MG: 500 TABLET, FILM COATED ORAL at 16:09

## 2018-10-29 RX ADMIN — FUROSEMIDE 40 MG: 10 INJECTION, SOLUTION INTRAMUSCULAR; INTRAVENOUS at 16:08

## 2018-10-29 RX ADMIN — SPIRONOLACTONE 25 MG: 25 TABLET ORAL at 05:45

## 2018-10-29 RX ADMIN — HEPARIN SODIUM 5000 UNITS: 5000 INJECTION, SOLUTION INTRAVENOUS; SUBCUTANEOUS at 16:06

## 2018-10-29 RX ADMIN — SENNOSIDES AND DOCUSATE SODIUM 2 TABLET: 8.6; 5 TABLET ORAL at 17:29

## 2018-10-29 RX ADMIN — IPRATROPIUM BROMIDE AND ALBUTEROL SULFATE 3 ML: .5; 3 SOLUTION RESPIRATORY (INHALATION) at 18:17

## 2018-10-29 RX ADMIN — FERROUS SULFATE TAB 325 MG (65 MG ELEMENTAL FE) 325 MG: 325 (65 FE) TAB at 05:45

## 2018-10-29 RX ADMIN — HEPARIN SODIUM 5000 UNITS: 5000 INJECTION, SOLUTION INTRAVENOUS; SUBCUTANEOUS at 05:54

## 2018-10-29 RX ADMIN — FUROSEMIDE 40 MG: 10 INJECTION, SOLUTION INTRAMUSCULAR; INTRAVENOUS at 05:54

## 2018-10-29 RX ADMIN — ACETAMINOPHEN 1000 MG: 500 TABLET, FILM COATED ORAL at 23:01

## 2018-10-29 RX ADMIN — METHYLPREDNISOLONE SODIUM SUCCINATE 125 MG: 125 INJECTION, POWDER, FOR SOLUTION INTRAMUSCULAR; INTRAVENOUS at 17:29

## 2018-10-29 RX ADMIN — TRAMADOL HYDROCHLORIDE 100 MG: 50 TABLET, FILM COATED ORAL at 01:16

## 2018-10-29 RX ADMIN — IPRATROPIUM BROMIDE AND ALBUTEROL SULFATE 3 ML: .5; 3 SOLUTION RESPIRATORY (INHALATION) at 22:34

## 2018-10-29 RX ADMIN — TRAMADOL HYDROCHLORIDE 100 MG: 50 TABLET, FILM COATED ORAL at 16:09

## 2018-10-29 RX ADMIN — BUDESONIDE AND FORMOTEROL FUMARATE DIHYDRATE 2 PUFF: 160; 4.5 AEROSOL RESPIRATORY (INHALATION) at 07:03

## 2018-10-29 RX ADMIN — IPRATROPIUM BROMIDE AND ALBUTEROL SULFATE 3 ML: .5; 3 SOLUTION RESPIRATORY (INHALATION) at 10:33

## 2018-10-29 RX ADMIN — WARFARIN SODIUM 7.5 MG: 7.5 TABLET ORAL at 19:10

## 2018-10-29 RX ADMIN — IPRATROPIUM BROMIDE AND ALBUTEROL SULFATE 3 ML: .5; 3 SOLUTION RESPIRATORY (INHALATION) at 02:25

## 2018-10-29 RX ADMIN — IPRATROPIUM BROMIDE AND ALBUTEROL SULFATE 3 ML: .5; 3 SOLUTION RESPIRATORY (INHALATION) at 07:03

## 2018-10-29 RX ADMIN — DOXYCYCLINE 100 MG: 100 TABLET ORAL at 08:13

## 2018-10-29 RX ADMIN — LIDOCAINE 2 PATCH: 50 PATCH TOPICAL at 16:08

## 2018-10-29 RX ADMIN — IPRATROPIUM BROMIDE AND ALBUTEROL SULFATE 3 ML: .5; 3 SOLUTION RESPIRATORY (INHALATION) at 14:47

## 2018-10-29 ASSESSMENT — ENCOUNTER SYMPTOMS
ABDOMINAL PAIN: 0
BLURRED VISION: 0
NAUSEA: 0
COUGH: 1
FEVER: 0
CHILLS: 0
POLYDIPSIA: 0
VOMITING: 0
ORTHOPNEA: 1
HEARTBURN: 0
SPUTUM PRODUCTION: 1
SHORTNESS OF BREATH: 1
DEPRESSION: 0
MYALGIAS: 0
SPEECH CHANGE: 0
DOUBLE VISION: 0
HEADACHES: 0
MYALGIAS: 1
DIZZINESS: 0
PALPITATIONS: 0
NECK PAIN: 0

## 2018-10-29 ASSESSMENT — PAIN SCALES - GENERAL
PAINLEVEL_OUTOF10: 5
PAINLEVEL_OUTOF10: 4
PAINLEVEL_OUTOF10: 3
PAINLEVEL_OUTOF10: 7
PAINLEVEL_OUTOF10: 6
PAINLEVEL_OUTOF10: 3

## 2018-10-29 ASSESSMENT — CHA2DS2 SCORE
CHA2DS2 VASC SCORE: 5
VASCULAR DISEASE: YES
AGE 65 TO 74: NO
SEX: MALE
AGE 75 OR GREATER: NO
HYPERTENSION: NO
PRIOR STROKE OR TIA OR THROMBOEMBOLISM: YES
CHF OR LEFT VENTRICULAR DYSFUNCTION: YES
DIABETES: YES

## 2018-10-29 ASSESSMENT — LIFESTYLE VARIABLES: SUBSTANCE_ABUSE: 0

## 2018-10-29 NOTE — PROGRESS NOTES
Received report from night shift RN Katey. Assumed care of patient. Patient is SR on the monitor at this time. Patient is sitting at edge of bed with no family present at bedside at this time. Patient declines any needs at this time. Plan of care discussed with patient. Bed locked and in the lowest position with call light within reach.

## 2018-10-29 NOTE — PROGRESS NOTES
Two RN skin assessment completed with MIN Hester.  Bilateral ears red and blanching.   Bilateral elbows dry, red, and blanching.   Abrasion to right forearm. Dressing CDI.   Sacrum red and blanching.  Discoloration noted to bilateral lower extremities.   Bilateral heels dry, calloused, red, and blanching.   Skin otherwise intact.

## 2018-10-29 NOTE — PROGRESS NOTES
Trauma / Surgical Daily Progress Note    Date of Service  10/29/2018    Chief Complaint  52 y.o. male admitted 10/27/2018 with right rib fractures and pneumothorax    Interval Events    No pneumothorax / pleural effusion on am CXR  Adequate pain control.    - Continue aggressive pulmonary hygiene, multimodal pain control, and CXR imaging   - Counseled   - No trauma needs at this time / please re contact if issues arise.    Review of Systems  Review of Systems   Constitutional: Negative for chills and fever.   Eyes: Negative for blurred vision.   Respiratory: Positive for cough, sputum production and shortness of breath.         Home oxygen use   Cardiovascular: Positive for orthopnea and leg swelling. Negative for chest pain.   Gastrointestinal: Negative for abdominal pain, nausea and vomiting.   Musculoskeletal: Positive for myalgias.        Rib fracture pain   Skin: Negative for rash.   Neurological: Negative for speech change and headaches.        Vital Signs  Temp:  [36.5 °C (97.7 °F)-37.3 °C (99.2 °F)] 37.3 °C (99.2 °F)  Pulse:  [64-77] 70  Resp:  [16-20] 16  BP: (112-154)/(62-86) 154/84    Physical Exam  Physical Exam   Constitutional: He is oriented to person, place, and time. He is cooperative. Nasal cannula in place.   HENT:   Head: Atraumatic.   Eyes: Conjunctivae are normal.   Neck: No JVD present.   Cardiovascular: Normal rate.    Pulmonary/Chest: He has wheezes. He exhibits tenderness (Rib fractrure pain).   No acute respiratory distress  IS 1200 cc   Abdominal:   Obese, nontender   Musculoskeletal: He exhibits edema (Bilateral lower extremity edema).   Neurological: He is alert and oriented to person, place, and time.   Skin: Skin is warm.   Bilateral lower extremity discoloration   Nursing note and vitals reviewed.      Laboratory  Recent Results (from the past 24 hour(s))   BTYPE NATRIURETIC PEPTIDE    Collection Time: 10/29/18  1:52 AM   Result Value Ref Range    B Natriuretic Peptide 1146 (H) 0 -  100 pg/mL   COMP METABOLIC PANEL    Collection Time: 10/29/18  1:52 AM   Result Value Ref Range    Sodium 130 (L) 135 - 145 mmol/L    Potassium 4.8 3.6 - 5.5 mmol/L    Chloride 97 96 - 112 mmol/L    Co2 23 20 - 33 mmol/L    Anion Gap 10.0 0.0 - 11.9    Glucose 212 (H) 65 - 99 mg/dL    Bun 30 (H) 8 - 22 mg/dL    Creatinine 1.60 (H) 0.50 - 1.40 mg/dL    Calcium 9.3 8.5 - 10.5 mg/dL    AST(SGOT) 15 12 - 45 U/L    ALT(SGPT) 9 2 - 50 U/L    Alkaline Phosphatase 93 30 - 99 U/L    Total Bilirubin 0.8 0.1 - 1.5 mg/dL    Albumin 3.8 3.2 - 4.9 g/dL    Total Protein 8.2 6.0 - 8.2 g/dL    Globulin 4.4 (H) 1.9 - 3.5 g/dL    A-G Ratio 0.9 g/dL   CBC WITH DIFFERENTIAL    Collection Time: 10/29/18  1:52 AM   Result Value Ref Range    WBC 20.3 (H) 4.8 - 10.8 K/uL    RBC 3.99 (L) 4.70 - 6.10 M/uL    Hemoglobin 7.2 (L) 14.0 - 18.0 g/dL    Hematocrit 28.4 (L) 42.0 - 52.0 %    MCV 71.2 (L) 81.4 - 97.8 fL    MCH 18.0 (L) 27.0 - 33.0 pg    MCHC 25.4 (L) 33.7 - 35.3 g/dL    RDW 65.3 (H) 35.9 - 50.0 fL    Platelet Count 237 164 - 446 K/uL    MPV 10.0 9.0 - 12.9 fL    Neutrophils-Polys 93.60 (H) 44.00 - 72.00 %    Lymphocytes 1.80 (L) 22.00 - 41.00 %    Monocytes 2.80 0.00 - 13.40 %    Eosinophils 0.00 0.00 - 6.90 %    Basophils 0.20 0.00 - 1.80 %    Immature Granulocytes 1.60 (H) 0.00 - 0.90 %    Nucleated RBC 0.10 /100 WBC    Neutrophils (Absolute) 19.00 (H) 1.82 - 7.42 K/uL    Lymphs (Absolute) 0.37 (L) 1.00 - 4.80 K/uL    Monos (Absolute) 0.57 0.00 - 0.85 K/uL    Eos (Absolute) 0.01 0.00 - 0.51 K/uL    Baso (Absolute) 0.04 0.00 - 0.12 K/uL    Immature Granulocytes (abs) 0.32 (H) 0.00 - 0.11 K/uL    NRBC (Absolute) 0.02 K/uL   MAGNESIUM    Collection Time: 10/29/18  1:52 AM   Result Value Ref Range    Magnesium 2.1 1.5 - 2.5 mg/dL   ESTIMATED GFR    Collection Time: 10/29/18  1:52 AM   Result Value Ref Range    GFR If  55 (A) >60 mL/min/1.73 m 2    GFR If Non African American 45 (A) >60 mL/min/1.73 m 2        Fluids    Intake/Output Summary (Last 24 hours) at 10/29/18 1037  Last data filed at 10/29/18 0705   Gross per 24 hour   Intake              480 ml   Output             2240 ml   Net            -1760 ml       Core Measures & Quality Metrics  Labs reviewed, Medications reviewed and Radiology images reviewed  Ceballos catheter: No Ceballos      DVT Prophylaxis: Heparin  DVT prophylaxis - mechanical: SCDs  Ulcer prophylaxis: Not indicated  Antibiotics: Treating active infection/contamination beyond 24 hours perioperative coverage  Assessed for rehab: Patient returned to prior level of function, rehabilitation not indicated at this time    Total Score: 4    ETOH Screening     Intervention complete date: 10/28/2018  Patient response to intervention: Denies illicit/presciption drug and alcohol abuse. Intervention not indicated .         Assessment/Plan  Pneumothorax- (present on admission)   Assessment & Plan    10/27 CXR imaging with possible small right apical pneumothorax   10/28 CXR with grossly stable chest appearance compared with 10/27.  10/19 CXR with no pneumothorax / no pleural effusion   Trend CXR imaging        Closed fracture of multiple ribs of right side- (present on admission)   Assessment & Plan    10/27 Rib series with right fifth through eighth ribs, and there is minimal subcutaneous air in that area.   There is minimal extrapleural density along the right lateral chest wall, consistent with hemorrhage.  Attempted CT chest - too large to fit in scanner  Blunt chest protocol. Aggressive pulmonary hygiene and pain management.         Morbid obesity with BMI of 40.0-44.9, adult (HCC)- (present on admission)   Assessment & Plan    BMI 41.5          Discussed patient condition with Patient and trauma surgery, Dr. Stalin Richter.    Patient seen, data reviewed and discussed.  Agree with assessment and plan.  EDYTA

## 2018-10-29 NOTE — CARE PLAN
Problem: Oxygenation:  Goal: Maintain adequate oxygenation dependent on patient condition  Outcome: PROGRESSING AS EXPECTED    Intervention: Manage oxygen therapy by monitoring pulse oximetry and/or ABG values  Pt off of O2 majority of the day sating in mid 90s; after walk pt placed back on 2LPM NC; will continue to monitor and titrate back to baseline O2      Problem: Bronchoconstriction:  Goal: Improve in air movement and diminished wheezing  Outcome: PROGRESSING AS EXPECTED    Intervention: Implement inhaled treatments  Pt receiving DUO Q4; Expiratory wheeze bilate; pts albuterol inhaler ordered prn;      Problem: Hyperinflation:  Goal: Prevent or improve atelectasis  Outcome: PROGRESSING AS EXPECTED    Intervention: Instruct incentive spirometry usage  Per protocol pt doing PEP therapy for frx ribs; pulling 1500 on IS

## 2018-10-29 NOTE — CARE PLAN
Problem: Communication  Goal: The ability to communicate needs accurately and effectively will improve  Outcome: PROGRESSING AS EXPECTED    Intervention: Hayden patient and significant other/support system to call light to alert staff of needs   10/28/18 2027   OTHER   Oriented to: All of the Following : Location of Bathroom, Visiting Policy, Unit Routine, Call Light and Bedside Controls, Bedside Rail Policy, Smoking Policy, Rights and Responsibilities, Bedside Report, and Patient Education Notebook         Problem: Knowledge Deficit  Goal: Knowledge of disease process/condition, treatment plan, diagnostic tests, and medications will improve  Outcome: PROGRESSING AS EXPECTED  Patient educated about disease process and plan of care for the shift. Patient expressed understanding. All questions answered at this time.

## 2018-10-29 NOTE — CARE PLAN
Problem: Safety  Goal: Will remain free from injury  Outcome: PROGRESSING AS EXPECTED  Discussed with the patient the importance of calling for assistance prior to getting out of bed in order to help prevent falls while in the hospital. Patient accepting of the information. All questions answered at this time.     Problem: Skin Integrity  Goal: Risk for impaired skin integrity will decrease  Outcome: PROGRESSING AS EXPECTED  Discussed with the patient the importance of the use of the foam ear protectors on the oxygen in order to help prevent skin breakdown. Patient accepting of the information. All questions answered at this time.

## 2018-10-29 NOTE — PROGRESS NOTES
Report received from previous nurse. Patient encouraged to call for assistance. Call light within reach. Bed locked and in lowest position, bed alarm on.

## 2018-10-29 NOTE — PROGRESS NOTES
Internal Medicine Interval Note  Note Author: Romario Dave M.D.     Name Joshua Medrano     1965   Age/Sex 52 y.o. male   MRN [id number]   Code Status Full Code     After 5PM or if no immediate response to page, please call for cross-coverage  Attending/Team: Mando/Margarita See Patient List for primary contact information  Call [phone] to page    1st Call - Day Intern (R1):   Tenzin 2nd Call - Day Sr. Resident (R2/R3):   Tia         Reason for interval visit  (Principal Problem)   COPD exacerbation vs CHF exacerbation      Interval Problem Daily Status Update  (24 hours, problem oriented, brief subjective history, new lab/imaging data pertinent to that problem)   Pt was seen at bedside resting comfortably. Reports considerable improvement in SOB and Rib pain. Saw pt again later time, he reports cough with sputum. Denies fever, sob, chest pain, lethargy     -No bowel movement in couple days. Mirilax tomorrow   -Change Tramadol Q6 to Q8  -Improved Pneumothorax on CXR however, pulm edema/infiltrate continues  -Continue gentle diuresis  -Started pt on Warfarin with INR goal on 2-3. Spoke with wife over the phone and she reports pt not taking Rivaroxiban due to financial reasons.   -Sputum culture   -Changed Medrol Q8 to Q12   -PT/OT  -Ambulate with assistance TID  -Additional dose of 20 Lasix       Review of Systems   Constitutional: Negative for chills and fever.   HENT: Negative for hearing loss and tinnitus.    Eyes: Negative for blurred vision and double vision.   Respiratory: Positive for cough (With later visit ) and shortness of breath.    Cardiovascular: Positive for leg swelling (Improvement). Negative for chest pain and palpitations.   Gastrointestinal: Negative for heartburn and nausea.   Genitourinary: Negative for dysuria and urgency.   Musculoskeletal: Negative for myalgias and neck pain.   Skin: Negative for itching and rash.   Neurological: Negative for dizziness and  headaches.   Endo/Heme/Allergies: Negative for environmental allergies and polydipsia.   Psychiatric/Behavioral: Negative for depression and substance abuse.       Disposition/Barriers to discharge:   COPD exacerbation vs CHF failure     Consultants/Specialty  Trauma surgery    PCP: Nam Le M.D.      Quality Measures  Quality-Core Measures   Reviewed items::  Labs reviewed, Medications reviewed and Radiology images reviewed  Ceballos catheter::  No Ceballos          Physical Exam       Vitals:    10/29/18 0703 10/29/18 0811 10/29/18 1033 10/29/18 1237   BP:  154/84  152/90   Pulse: 77 67 70 66   Resp: 16 18 16 18   Temp:  37.3 °C (99.2 °F)  37.4 °C (99.3 °F)   SpO2: 93% 94% 93% 93%   Weight:  (!) 167.1 kg (368 lb 6.2 oz)     Height:         Body mass index is 43.68 kg/m². Weight: (!) 167.1 kg (368 lb 6.2 oz)  Oxygen Therapy:  Pulse Oximetry: 93 %, O2 (LPM): 2, O2 Delivery: Silicone Nasal Cannula    Physical Exam   Constitutional: He is oriented to person, place, and time and well-developed, well-nourished, and in no distress. No distress.   Morbidly obese male     HENT:   Head: Normocephalic and atraumatic.   Mouth/Throat: Oropharynx is clear and moist. No oropharyngeal exudate.   Mallampati 3-4    Eyes: Pupils are equal, round, and reactive to light. Conjunctivae and EOM are normal. Right eye exhibits no discharge. Left eye exhibits no discharge.   Neck: Normal range of motion. Neck supple. No thyromegaly present.   Cardiovascular: Normal rate, regular rhythm and intact distal pulses.  Exam reveals no gallop.    Pulmonary/Chest: Effort normal and breath sounds normal. No respiratory distress. He has no wheezes.   Abdominal: Soft. Bowel sounds are normal. He exhibits distension. There is no tenderness.   Musculoskeletal: Normal range of motion. He exhibits edema (Trace edema in bilateral lower extremities. Improving). He exhibits no deformity.   Neurological: He is alert and oriented to person, place, and time. No  cranial nerve deficit. Coordination normal. GCS score is 15.   Skin: Skin is warm and dry. No rash noted. He is not diaphoretic. No erythema.   Psychiatric: Mood, memory, affect and judgment normal.       Assessment/Plan     * Acute on chronic respiratory failure (HCC)- (present on admission)   Assessment & Plan    COPD exacerbation vs HFpEF vs SHASHANK   Improving SOB, PND, Orthopnea, bilateral lower leg edema    Plan  Increase Lasix to 40 IV twice daily, monitor BUN/creatinine  Reduced O2 requirements to 2L NC  Continues scheduled duo nebs  Reduced IV steroids  Respiratory therapy per protocol  Add ceftriaxone to doxycycline since underlying pneumonia is possible and also significant rise in Leucocytes along with Medrol and pt has chronically elevated WBC  Monitor closely              Pneumothorax- (present on admission)   Assessment & Plan    Pt suffered from Rib fracture 5-8  -pneumothorax seems to have resolved    Plan  Continue O2  Tramadol Q8  Morphine PRN  RT per protocal  Incentive spirometry           Closed fracture of multiple ribs of right side- (present on admission)   Assessment & Plan    Pt suffered from fall from [facility] [place]  X-ray showed multiple closed rib fractures from 5-8 with Apical pneumothorax   Pt reports pain at R. Lower chest. No erythema or bruising. Pain subsided to Mild pain  Trauma surgery is on board  Repeated x-rays did not show expansion of the pneumothorax  CT chest could not be done due to body habitus  Pain is under control with scheduled acetaminophen, tramadol and as needed morphine  Patient has been encouraged multiple times to use incentive spirometry.  If there is a concern for worsening respiratory distress, we will repeat chest x-ray, and if there is no pneumothorax consider patient for BiPAP therapy in ICU                Heart failure with preserved ejection fraction, borderline, class IV (HCC)- (present on admission)   Assessment & Plan    Prior echo with EF of 55%.   Worsening BNP with HFpEF. Improved Pneumothorax on CXR however, pulm edema/infiltrate continues  SOB improving  -Pt's O2 requirements have come down to 2L  -Continue gentle diuresis  -Lasix   -Home Amiodarone  -Metoprolol XR  -Spironolactone             Chronic obstructive pulmonary disease with acute exacerbation (HCC)- (present on admission)   Assessment & Plan    Pt is a current smoker with 30 Pack smoking hx with home O2 on 2L NC  Pt presented with SOB exacerbation secondary to possible infection vs CHF  Procal WNL  Elevated WBC, however this is chronic for pt. Medrol contributing to elevated WBC    Plan  See section on acute hypoxic respiratory failure        Morbid obesity with BMI of 40.0-44.9, adult ([facility])- (present on admission)   Assessment & Plan    BMI of 43.74  Pt education at discharge          Microcytic anemia- (present on admission)   Assessment & Plan    Pt has hx of Microcytic anemia  Pt's baseline Hgb is around 7, stable  Hx of GI blood loss  Low iron at admission  Hx of Intermittent Hematochezia and Melena in the last 3-6 months    Ferrous Sulfate  CTF  First Occult blood neg. Pending second Occult Blood            SHASHANK (obstructive sleep apnea)- (present on admission)   Assessment & Plan    Pt is morbidly obese with Mallampatti score of 3-4  Pt reports orthopnea and PND(improving)   Pt O2 requirement have come down to 2L   Out pt Sleep Study  F/U with PCP        CAD (coronary artery disease)- (present on admission)   Assessment & Plan    Hx of Ischemic heart disease with bypass surgery in 2017    Home Amiodarone  Home Metoprolol SR  Home Spironolactone          Acute exacerbation of CHF (congestive heart failure) ([facility])- (present on admission)   Assessment & Plan    Inability to carry any physical activity  Pt has elevated BNP of 593 on 10/27, continues to increase. Possible Cor Pulmoary with HFpEF  B/L LE edema, distended abdomen, CXR consistent with Pulmonary edema    Plan:  Increase lasix to 40  bid  Monitor kidney function              Forearm abrasion, right, initial encounter- (present on admission)   Assessment & Plan    During transport to ED patient unfortunately fell along with the gurney causing right forearm abrasions.  -Edema without fractures of the right forearm    Wound care  Pain control with tramadol Q8hrs  As needed morphine 2 mg        Dyslipidemia- (present on admission)   Assessment & Plan    Pt morbidly obese with hx of Dyslipidemia    Lipid panel pending          CKD (chronic kidney disease), stage III (HCC)- (present on admission)   Assessment & Plan    GFR of 45, Cr 1.60 and BUN of 30  Hx of CKD    Gentle diuresis with Lasix  ctf          Cigarette nicotine dependence with nicotine-induced disorder- (present on admission)   Assessment & Plan    Pt is is current smoker of 1-2 cigarrets/day with 30 pack smoking history  Pt reports he will stop smoking after this hospital stay  Will discuss with pt at time of discharge    Nicotine patch        Paroxysmal atrial fibrillation (HCC)- (present on admission)   Assessment & Plan    Pt has hx of paraxysmal A-fib  Started warfarin 10/29  Wife reports pt was not on Rivaraxiban due to financial reasons        Type 2 diabetes mellitus with complication, without long-term current use of insulin (HCC)- (present on admission)   Assessment & Plan    Pt is a morbidly obese male with BMI of 43.74    HgbA1C 5.9 on 3/28/2018  Lipid panel pending  POC glucose of 122

## 2018-10-29 NOTE — NON-PROVIDER
Internal Medicine Medical Student Note  Note Author: Eugenio Valdes, Student    Name Joshua Medrano     1965   Age/Sex 52 y.o. male   MRN [id number]   Code Status Full       Reason for interval visit  (Principal Problem)   Acute on chronic respiratory failure (HCC)    Interval Problem Daily Status Update  (problem status, last 24 hours, new history, new data )   No acute overnight events. Patient feels subjectively better compared to yesterday, with somewhat decreased rib pain. His O2 requirements have returned to his baseline 2L. BNP up to 1146. Serial CXRs stable, no pneumothorax.       Physical Exam     Vitals:    10/29/18 0533 10/29/18 0703 10/29/18 0811 10/29/18 1033   BP: 120/65  154/84    Pulse: 69 77 67 70   Resp: 17 16 18 16   Temp: 36.9 °C (98.4 °F)  37.3 °C (99.2 °F)    SpO2: 93% 93% 94% 93%   Weight:   (!) 167.1 kg (368 lb 6.2 oz)    Height:         Body mass index is 43.68 kg/m². Weight: (!) 167.1 kg (368 lb 6.2 oz)  Oxygen Therapy:  Pulse Oximetry: 93 %, O2 (LPM): 2, O2 Delivery: Silicone Nasal Cannula    Physical Exam   Constitutional: He is oriented to person, place, and time. He appears unhealthy. No distress.   HENT:   Head: Normocephalic and atraumatic.   Eyes: EOM are normal.   Neck: Normal range of motion.   Cardiovascular: Normal rate, regular rhythm and intact distal pulses.  Exam reveals no friction rub.    Murmur heard.   Systolic murmur is present with a grade of 2/6   Pulmonary/Chest: No tachypnea. He has no wheezes. He has no rales. He exhibits tenderness.   Diffuse wheezes throughout.    Abdominal: Soft. Bowel sounds are normal. He exhibits distension. There is no tenderness. There is no rebound and no guarding.   Neurological: He is alert and oriented to person, place, and time. GCS score is 15.   Skin: Skin is warm and dry.   Psychiatric: Mood and affect normal.       Labs/Imaging     Lab Results   Component Value Date/Time    SODIUM 130 (L) 10/29/2018 01:52 AM    POTASSIUM  4.8 10/29/2018 01:52 AM    CHLORIDE 97 10/29/2018 01:52 AM    CO2 23 10/29/2018 01:52 AM    BUN 30 (H) 10/29/2018 01:52 AM    CREATININE 1.60 (H) 10/29/2018 01:52 AM    GLUCOSE 212 (H) 10/29/2018 01:52 AM     Lab Results   Component Value Date/Time    WBC 20.3 (H) 10/29/2018 01:52 AM    HEMOGLOBIN 7.2 (L) 10/29/2018 01:52 AM    HEMATOCRIT 28.4 (L) 10/29/2018 01:52 AM    PLATELETCT 237 10/29/2018 01:52 AM       Dx-chest-portable (1 View)  Result Date: 10/29/2018  Impression:  1.  Pulmonary edema and/or infiltrates are identified, which are stable since the prior exam.  2.  Cardiomegaly        Assessment/Plan   Mr. Medrano is a 52 year old male with acute on chronic respiratory failure with hypoxia. The differential includes, but is not limited to: COPD exacerbation, CHFpEF exacerbation, and obstructive sleep apnea. He also suffered right rib fractures 2/2 ground-level fall. He has a complex medical history including COPD, CHF, CAD s/p CABG, paroxysmal atrial fibrillation, morbid obesity, DM2, anemia, HTN, HLD, and medical noncompliance.        1. Acute on Chronic Respiratory Failure with Hypoxia  - Two days of SOB, cough with sick contact  - Hx of COPD, CHF, mild intermittent asthma with multiple admissions for exacerbations of such  - Wheezes no longer present, O2 requirement back at baseline 2L  - BNP up to 1146 today  - CXR stable     Plan:   - RT protocol  - Duoneb q4h scheduled  - Decreased IV solu-medrol 125 from q8h to bid; on day 3/5 of steroids  - Doxycycline 100mg po bid and Ceftriaxone 2g qd   - Continue Lasix 40mg IV bid; given extra 20mg IV this morning  - Continuous pulse ox; titrate nasal O2 to maintain SpO2 88-92%     2. Rib Fractures  3. Pneumothorax - resolved  - 2/2 Ground level fall from EMS gurney  - XR showed acute fractures of the anterolateral aspects of the right fifth through eighth ribs and small right apical pneumothorax  - Pain mildly improved, patient only required 2mg of morphine in last  24 hours  - CXR today stable as compared to yesterday; right apical pneumothorax appears to have resolved     Plan:   - Surgery continues to follow patient, appreciate their input  - Pain control:    - Lidocaine patch              - Tylenol 1g q8h   - Tramadol 50 q6h              - Morphine 2mg IV q3h prn for severe pain  - Encourage use of IS    4. Leukocytosis  - Chronic in nature  - WBC 20 today with neutrophilia  - Increase today like due to steroids for COPD exacerbation; chronic nature likely secondary to stress  - Infection less likely but cannot be ruled out; patient afebrile and procalcitonin WNL  - Doxy and C3 as above     5. Anemia, microcytic  - Previous admissions for GI bleed from unclear origin - ?diverticular bleed  - Last admission this month diagnosed with iron-deficiency anemia  - Colonoscopy and EGD in July '18 revealed no source of the bleeding  - Continue Ferrous sulfate 325 daily        Chronic Issues:     6. Congestive Heart Failure with Preserved Ejection Fraction  - Last echo 10/10/18: LVEF 55%  - Lasix as above  - Continue home spironolactone 25 daily     7. Paroxysmal Atrial Fibrillation  - Anticoagulation was held a few months ago due to recent GI bleed  - Started Heparin 5000u q8hg for DVT prophylaxis; will consider resuming warfarin if patient's clinical picture continues to improve  - Continue home amiodarone 200mg daily and metoprolol sr 25mg daily    8. CKD, stage III  - eGFR of 45 this am with BUN 30, creatinine 1.6  - Lasix as above okay  - Will continue to follow     11. Essential HTN  - -145 overnight   - Continue home metoprolol sr 25mg daily     12. Hyperlipidemia  - Lipids: total 77, trigs 72, HDL 23, LDL 40  - Continue home rosuvastatin 40 daily    12. DM2  - A1C 5.9 on 3/28/2018  - Glucose overnight 212; likely due to steroids for COPD exacerbation  - Follow-up with PCP

## 2018-10-29 NOTE — PROGRESS NOTES
Inpatient Anticoagulation Service Note    Date: 10/29/2018  Reason for Anticoagulation: Atrial Fibrillation   [id number] VASc Score: 2    Hemoglobin Value: (!) 7.2  Hematocrit Value: (!) 28.4  Lab Platelet Value: 237  Target INR: 2.0 to 3.0    INR from last 7 days     None        Dose from last 7 days     Date/Time Dose (mg)    10/29/18 1600  7.5        Significant Interactions: Amiodarone, Corticosteroids, Statin, Antibiotics  Bridge Therapy: No   Reversal Agent Administered: Not Applicable  Comments: Patient was previously seen at [facility]. Last appt was 2018. Total weekly dosing at that time was 60 mg per week. Currently heparin 5000 units TID for DVT prophylaxis. No baseline INR at this time. I expect INR to be subtherapeutic because patient has not been taking warfarin due to recent GI bleed in the last 2 to 5 months.    Plan:  Start warfarin at 7.5 mg po qd and adjust per ongoing INR results     Pharmacist suggested discharge dosin.5 mg po qd     Kalia Grey      Addendum:  I have reviewed the patient chart and the above note.  Pt was on warfarin for A.fib with a hx of MI and Stroke (CHADS ~ 5). Previous dose (2018) was 5mg every Mon/Wed and 10mg on all other days per [facility] - pt was therapeutic at that dose - however - given hx of GIB and DDI with amiodarone would recommend conservative dosing. Currently starting 7.5mg daily with no bolus is appropriate - if INR trends up quickly, would recommend decreasing dose.   Additional DDI noted - spironolactone.  H/H is low but appears stable with no indication of active bleeding noted.     INR at baseline obtained and is elevated.   10/29/2018 16:45   PT 17.1 (H)   INR 1.39 (H)         Norma Vail, PharmD., BCPS

## 2018-10-30 ENCOUNTER — APPOINTMENT (OUTPATIENT)
Dept: RADIOLOGY | Facility: MEDICAL CENTER | Age: 53
DRG: 291 | End: 2018-10-30
Attending: SURGERY
Payer: OTHER MISCELLANEOUS

## 2018-10-30 LAB
ANION GAP SERPL CALC-SCNC: 12 MMOL/L (ref 0–11.9)
BASOPHILS # BLD AUTO: 0.2 % (ref 0–1.8)
BASOPHILS # BLD: 0.04 K/UL (ref 0–0.12)
BNP SERPL-MCNC: 1084 PG/ML (ref 0–100)
BUN SERPL-MCNC: 36 MG/DL (ref 8–22)
CALCIUM SERPL-MCNC: 9.8 MG/DL (ref 8.5–10.5)
CHLORIDE SERPL-SCNC: 95 MMOL/L (ref 96–112)
CO2 SERPL-SCNC: 23 MMOL/L (ref 20–33)
CREAT SERPL-MCNC: 1.56 MG/DL (ref 0.5–1.4)
EOSINOPHIL # BLD AUTO: 0 K/UL (ref 0–0.51)
EOSINOPHIL NFR BLD: 0 % (ref 0–6.9)
ERYTHROCYTE [DISTWIDTH] IN BLOOD BY AUTOMATED COUNT: 63.7 FL (ref 35.9–50)
GLUCOSE SERPL-MCNC: 154 MG/DL (ref 65–99)
HCT VFR BLD AUTO: 27.6 % (ref 42–52)
HGB BLD-MCNC: 7.4 G/DL (ref 14–18)
IMM GRANULOCYTES # BLD AUTO: 0.71 K/UL (ref 0–0.11)
IMM GRANULOCYTES NFR BLD AUTO: 2.9 % (ref 0–0.9)
INR PPP: 1.41 (ref 0.87–1.13)
LYMPHOCYTES # BLD AUTO: 0.37 K/UL (ref 1–4.8)
LYMPHOCYTES NFR BLD: 1.5 % (ref 22–41)
MCH RBC QN AUTO: 18.5 PG (ref 27–33)
MCHC RBC AUTO-ENTMCNC: 26.8 G/DL (ref 33.7–35.3)
MCV RBC AUTO: 69 FL (ref 81.4–97.8)
MONOCYTES # BLD AUTO: 0.97 K/UL (ref 0–0.85)
MONOCYTES NFR BLD AUTO: 4 % (ref 0–13.4)
NEUTROPHILS # BLD AUTO: 22.38 K/UL (ref 1.82–7.42)
NEUTROPHILS NFR BLD: 91.4 % (ref 44–72)
NRBC # BLD AUTO: 0.04 K/UL
NRBC BLD-RTO: 0.2 /100 WBC
PLATELET # BLD AUTO: 261 K/UL (ref 164–446)
PMV BLD AUTO: 10.3 FL (ref 9–12.9)
POTASSIUM SERPL-SCNC: 4.5 MMOL/L (ref 3.6–5.5)
PROTHROMBIN TIME: 17.3 SEC (ref 12–14.6)
RBC # BLD AUTO: 4 M/UL (ref 4.7–6.1)
SODIUM SERPL-SCNC: 130 MMOL/L (ref 135–145)
WBC # BLD AUTO: 24.5 K/UL (ref 4.8–10.8)

## 2018-10-30 PROCEDURE — 97161 PT EVAL LOW COMPLEX 20 MIN: CPT

## 2018-10-30 PROCEDURE — 71045 X-RAY EXAM CHEST 1 VIEW: CPT

## 2018-10-30 PROCEDURE — 94668 MNPJ CHEST WALL SBSQ: CPT

## 2018-10-30 PROCEDURE — 770020 HCHG ROOM/CARE - TELE (206)

## 2018-10-30 PROCEDURE — 700101 HCHG RX REV CODE 250

## 2018-10-30 PROCEDURE — 700111 HCHG RX REV CODE 636 W/ 250 OVERRIDE (IP): Performed by: HOSPITALIST

## 2018-10-30 PROCEDURE — 700102 HCHG RX REV CODE 250 W/ 637 OVERRIDE(OP): Performed by: STUDENT IN AN ORGANIZED HEALTH CARE EDUCATION/TRAINING PROGRAM

## 2018-10-30 PROCEDURE — 700102 HCHG RX REV CODE 250 W/ 637 OVERRIDE(OP): Performed by: INTERNAL MEDICINE

## 2018-10-30 PROCEDURE — 83880 ASSAY OF NATRIURETIC PEPTIDE: CPT

## 2018-10-30 PROCEDURE — 700105 HCHG RX REV CODE 258: Performed by: HOSPITALIST

## 2018-10-30 PROCEDURE — 36415 COLL VENOUS BLD VENIPUNCTURE: CPT

## 2018-10-30 PROCEDURE — 94760 N-INVAS EAR/PLS OXIMETRY 1: CPT

## 2018-10-30 PROCEDURE — 700101 HCHG RX REV CODE 250: Performed by: INTERNAL MEDICINE

## 2018-10-30 PROCEDURE — 94640 AIRWAY INHALATION TREATMENT: CPT

## 2018-10-30 PROCEDURE — 700102 HCHG RX REV CODE 250 W/ 637 OVERRIDE(OP): Performed by: HOSPITALIST

## 2018-10-30 PROCEDURE — 99232 SBSQ HOSP IP/OBS MODERATE 35: CPT | Mod: GC | Performed by: INTERNAL MEDICINE

## 2018-10-30 PROCEDURE — G8979 MOBILITY GOAL STATUS: HCPCS | Mod: CI

## 2018-10-30 PROCEDURE — G8978 MOBILITY CURRENT STATUS: HCPCS | Mod: CI

## 2018-10-30 PROCEDURE — 94669 MECHANICAL CHEST WALL OSCILL: CPT

## 2018-10-30 PROCEDURE — G8980 MOBILITY D/C STATUS: HCPCS | Mod: CI

## 2018-10-30 PROCEDURE — A9270 NON-COVERED ITEM OR SERVICE: HCPCS | Performed by: STUDENT IN AN ORGANIZED HEALTH CARE EDUCATION/TRAINING PROGRAM

## 2018-10-30 PROCEDURE — 700111 HCHG RX REV CODE 636 W/ 250 OVERRIDE (IP): Performed by: STUDENT IN AN ORGANIZED HEALTH CARE EDUCATION/TRAINING PROGRAM

## 2018-10-30 PROCEDURE — A9270 NON-COVERED ITEM OR SERVICE: HCPCS | Performed by: HOSPITALIST

## 2018-10-30 PROCEDURE — 80048 BASIC METABOLIC PNL TOTAL CA: CPT

## 2018-10-30 PROCEDURE — 700101 HCHG RX REV CODE 250: Performed by: SURGERY

## 2018-10-30 PROCEDURE — A9270 NON-COVERED ITEM OR SERVICE: HCPCS | Performed by: INTERNAL MEDICINE

## 2018-10-30 PROCEDURE — 85025 COMPLETE CBC W/AUTO DIFF WBC: CPT

## 2018-10-30 PROCEDURE — 85610 PROTHROMBIN TIME: CPT

## 2018-10-30 RX ORDER — METHYLPREDNISOLONE SODIUM SUCCINATE 40 MG/ML
40 INJECTION, POWDER, LYOPHILIZED, FOR SOLUTION INTRAMUSCULAR; INTRAVENOUS EVERY 12 HOURS
Status: DISCONTINUED | OUTPATIENT
Start: 2018-10-30 | End: 2018-10-31 | Stop reason: HOSPADM

## 2018-10-30 RX ORDER — GUAIFENESIN 600 MG/1
600 TABLET, EXTENDED RELEASE ORAL EVERY 12 HOURS
Status: DISCONTINUED | OUTPATIENT
Start: 2018-10-30 | End: 2018-10-31 | Stop reason: HOSPADM

## 2018-10-30 RX ORDER — IPRATROPIUM BROMIDE AND ALBUTEROL SULFATE 2.5; .5 MG/3ML; MG/3ML
3 SOLUTION RESPIRATORY (INHALATION)
Status: DISCONTINUED | OUTPATIENT
Start: 2018-10-30 | End: 2018-10-31 | Stop reason: HOSPADM

## 2018-10-30 RX ORDER — IPRATROPIUM BROMIDE AND ALBUTEROL SULFATE 2.5; .5 MG/3ML; MG/3ML
SOLUTION RESPIRATORY (INHALATION)
Status: COMPLETED
Start: 2018-10-30 | End: 2018-10-30

## 2018-10-30 RX ADMIN — IPRATROPIUM BROMIDE AND ALBUTEROL SULFATE 3 ML: .5; 3 SOLUTION RESPIRATORY (INHALATION) at 08:09

## 2018-10-30 RX ADMIN — ROSUVASTATIN CALCIUM 40 MG: 20 TABLET, FILM COATED ORAL at 05:24

## 2018-10-30 RX ADMIN — IPRATROPIUM BROMIDE AND ALBUTEROL SULFATE: .5; 3 SOLUTION RESPIRATORY (INHALATION) at 03:30

## 2018-10-30 RX ADMIN — ALBUTEROL SULFATE 2 PUFF: 90 AEROSOL, METERED RESPIRATORY (INHALATION) at 17:41

## 2018-10-30 RX ADMIN — IPRATROPIUM BROMIDE AND ALBUTEROL SULFATE 3 ML: .5; 3 SOLUTION RESPIRATORY (INHALATION) at 03:24

## 2018-10-30 RX ADMIN — ALBUTEROL SULFATE 2 PUFF: 90 AEROSOL, METERED RESPIRATORY (INHALATION) at 06:23

## 2018-10-30 RX ADMIN — HEPARIN SODIUM 5000 UNITS: 5000 INJECTION, SOLUTION INTRAVENOUS; SUBCUTANEOUS at 21:21

## 2018-10-30 RX ADMIN — FERROUS SULFATE TAB 325 MG (65 MG ELEMENTAL FE) 325 MG: 325 (65 FE) TAB at 05:25

## 2018-10-30 RX ADMIN — SPIRONOLACTONE 25 MG: 25 TABLET ORAL at 05:25

## 2018-10-30 RX ADMIN — IPRATROPIUM BROMIDE AND ALBUTEROL SULFATE 3 ML: .5; 3 SOLUTION RESPIRATORY (INHALATION) at 15:45

## 2018-10-30 RX ADMIN — METHYLPREDNISOLONE SODIUM SUCCINATE 40 MG: 40 INJECTION, POWDER, FOR SOLUTION INTRAMUSCULAR; INTRAVENOUS at 17:30

## 2018-10-30 RX ADMIN — TRAMADOL HYDROCHLORIDE 100 MG: 50 TABLET, FILM COATED ORAL at 05:24

## 2018-10-30 RX ADMIN — ALBUTEROL SULFATE 2 PUFF: 90 AEROSOL, METERED RESPIRATORY (INHALATION) at 13:00

## 2018-10-30 RX ADMIN — IPRATROPIUM BROMIDE AND ALBUTEROL SULFATE 3 ML: .5; 3 SOLUTION RESPIRATORY (INHALATION) at 11:34

## 2018-10-30 RX ADMIN — BUDESONIDE AND FORMOTEROL FUMARATE DIHYDRATE 2 PUFF: 160; 4.5 AEROSOL RESPIRATORY (INHALATION) at 18:42

## 2018-10-30 RX ADMIN — BUDESONIDE AND FORMOTEROL FUMARATE DIHYDRATE 2 PUFF: 160; 4.5 AEROSOL RESPIRATORY (INHALATION) at 08:12

## 2018-10-30 RX ADMIN — HEPARIN SODIUM 5000 UNITS: 5000 INJECTION, SOLUTION INTRAVENOUS; SUBCUTANEOUS at 05:25

## 2018-10-30 RX ADMIN — ANTACID TABLETS 500 MG: 500 TABLET, CHEWABLE ORAL at 02:50

## 2018-10-30 RX ADMIN — TRAMADOL HYDROCHLORIDE 100 MG: 50 TABLET, FILM COATED ORAL at 21:20

## 2018-10-30 RX ADMIN — GUAIFENESIN AND DEXTROMETHORPHAN 10 ML: 100; 10 SYRUP ORAL at 21:20

## 2018-10-30 RX ADMIN — METOPROLOL SUCCINATE 25 MG: 25 TABLET, EXTENDED RELEASE ORAL at 05:25

## 2018-10-30 RX ADMIN — ALBUTEROL SULFATE 2 PUFF: 90 AEROSOL, METERED RESPIRATORY (INHALATION) at 21:21

## 2018-10-30 RX ADMIN — HEPARIN SODIUM 5000 UNITS: 5000 INJECTION, SOLUTION INTRAVENOUS; SUBCUTANEOUS at 14:17

## 2018-10-30 RX ADMIN — ALBUTEROL SULFATE 2 PUFF: 90 AEROSOL, METERED RESPIRATORY (INHALATION) at 02:50

## 2018-10-30 RX ADMIN — LIDOCAINE 2 PATCH: 50 PATCH TOPICAL at 14:17

## 2018-10-30 RX ADMIN — CEFTRIAXONE SODIUM 2 G: 2 INJECTION, POWDER, FOR SOLUTION INTRAMUSCULAR; INTRAVENOUS at 05:26

## 2018-10-30 RX ADMIN — SENNOSIDES AND DOCUSATE SODIUM 2 TABLET: 8.6; 5 TABLET ORAL at 05:24

## 2018-10-30 RX ADMIN — DOXYCYCLINE 100 MG: 100 TABLET ORAL at 09:48

## 2018-10-30 RX ADMIN — ACETAMINOPHEN 1000 MG: 500 TABLET, FILM COATED ORAL at 21:20

## 2018-10-30 RX ADMIN — ANTACID TABLETS 500 MG: 500 TABLET, CHEWABLE ORAL at 19:19

## 2018-10-30 RX ADMIN — WARFARIN SODIUM 7.5 MG: 7.5 TABLET ORAL at 17:30

## 2018-10-30 RX ADMIN — IPRATROPIUM BROMIDE AND ALBUTEROL SULFATE 3 ML: .5; 3 SOLUTION RESPIRATORY (INHALATION) at 18:42

## 2018-10-30 RX ADMIN — FUROSEMIDE 40 MG: 10 INJECTION, SOLUTION INTRAMUSCULAR; INTRAVENOUS at 05:25

## 2018-10-30 RX ADMIN — ACETAMINOPHEN 1000 MG: 500 TABLET, FILM COATED ORAL at 05:46

## 2018-10-30 RX ADMIN — METHYLPREDNISOLONE SODIUM SUCCINATE 125 MG: 125 INJECTION, POWDER, FOR SOLUTION INTRAMUSCULAR; INTRAVENOUS at 05:25

## 2018-10-30 RX ADMIN — FUROSEMIDE 40 MG: 10 INJECTION, SOLUTION INTRAMUSCULAR; INTRAVENOUS at 17:30

## 2018-10-30 RX ADMIN — AMIODARONE HYDROCHLORIDE 200 MG: 200 TABLET ORAL at 05:24

## 2018-10-30 RX ADMIN — GUAIFENESIN 600 MG: 600 TABLET, EXTENDED RELEASE ORAL at 17:30

## 2018-10-30 ASSESSMENT — ENCOUNTER SYMPTOMS
VOMITING: 0
FOCAL WEAKNESS: 0
FEVER: 0
MYALGIAS: 1
BLURRED VISION: 0
NAUSEA: 0
ABDOMINAL PAIN: 0
SORE THROAT: 0
WHEEZING: 1
CHILLS: 0
WEAKNESS: 0
PND: 1
SPUTUM PRODUCTION: 1
NERVOUS/ANXIOUS: 0

## 2018-10-30 ASSESSMENT — GAIT ASSESSMENTS
GAIT LEVEL OF ASSIST: STAND BY ASSIST
DISTANCE (FEET): 40
DEVIATION: OTHER (COMMENT)

## 2018-10-30 ASSESSMENT — COGNITIVE AND FUNCTIONAL STATUS - GENERAL
MOVING TO AND FROM BED TO CHAIR: A LITTLE
MOBILITY SCORE: 20
TURNING FROM BACK TO SIDE WHILE IN FLAT BAD: A LITTLE
SUGGESTED CMS G CODE MODIFIER MOBILITY: CJ
MOVING FROM LYING ON BACK TO SITTING ON SIDE OF FLAT BED: A LOT

## 2018-10-30 ASSESSMENT — PAIN SCALES - GENERAL
PAINLEVEL_OUTOF10: 3

## 2018-10-30 NOTE — HEART FAILURE PROGRAM
Rib fractures. Acute on chronic HF diagnosed. This patient has been given 12 appointments at the [facility]. He has only completed 3 of those. 2 no-shows and the rest cancellations.     I have ordered a SW consult to assess for patient's lack of appointment attendance. He has been blocked completely at the discharge clinic for too many no-shows and I am hesitant to reserve an appointment for him at the HF clinic as these are very limited and should be given to someone who will attend.

## 2018-10-30 NOTE — NON-PROVIDER
Internal Medicine Medical Student Note  Note Author: Eugenio Valdes, Student    Name Joshua Medrano     1965   Age/Sex 52 y.o. male   MRN [id number]   Code Status Full       Reason for interval visit  (Principal Problem)   Acute on chronic respiratory failure (HCC)    Interval Problem Daily Status Update  (problem status, last 24 hours, new history, new data )   No acute overnight events. Patient feels subjectively better compared to yesterday, with somewhat decreased rib pain. His O2 requirements remain at his baseline 2L. BNP up to 1084. Serial CXRs remain stable.       Physical Exam     Vitals:    10/30/18 0412 10/30/18 0751 10/30/18 0812 10/30/18 1135   BP: 111/70 153/82     Pulse: 73 80 75 71   Resp: 18 16 20 18   Temp: 37.1 °C (98.7 °F) 37.2 °C (99 °F)     SpO2: 95% 94% 93% 93%   Weight:       Height:         Body mass index is 43.82 kg/m². Weight: (!) 167.6 kg (369 lb 7.9 oz)  Oxygen Therapy:  Pulse Oximetry: 93 %, O2 (LPM): 2, O2 Delivery: Silicone Nasal Cannula    Physical Exam   Constitutional: He is oriented to person, place, and time. He appears unhealthy. No distress.   HENT:   Head: Normocephalic and atraumatic.   Eyes: EOM are normal.   Neck: Normal range of motion.   Cardiovascular: Normal rate, regular rhythm and intact distal pulses.  Exam reveals no friction rub.    Murmur heard.   Systolic murmur is present with a grade of 2/6   Pulmonary/Chest: No tachypnea. He has wheezes. He has no rales. He exhibits tenderness.   Very fine wheezes at middle lobes bilaterally.    Abdominal: Soft. Bowel sounds are normal. He exhibits distension. There is no tenderness. There is no rebound and no guarding.   Neurological: He is alert and oriented to person, place, and time. GCS score is 15.   Skin: Skin is warm and dry.   Psychiatric: Mood and affect normal.       Labs/Imaging     Lab Results   Component Value Date/Time    SODIUM 130 (L) 10/30/2018 07:46 AM    POTASSIUM 4.5 10/30/2018 07:46 AM     CHLORIDE 95 (L) 10/30/2018 07:46 AM    CO2 23 10/30/2018 07:46 AM    BUN 36 (H) 10/30/2018 07:46 AM    CREATININE 1.56 (H) 10/30/2018 07:46 AM    GLUCOSE 154 (H) 10/30/2018 07:46 AM     Lab Results   Component Value Date/Time    WBC 24.5 (H) 10/30/2018 07:46 AM    HEMOGLOBIN 7.4 (L) 10/30/2018 07:46 AM    HEMATOCRIT 27.6 (L) 10/30/2018 07:46 AM    PLATELETCT 261 10/30/2018 07:46 AM     CXR portable   10/30/2018  Impression:   1.  Pulmonary edema and/or infiltrates are identified, which appear somewhat increased since the prior exam.  2.  Cardiomegaly      Assessment/Plan   Mr. Medrano is a 52 year old male with acute on chronic respiratory failure with hypoxia. The differential includes, but is not limited to: COPD exacerbation, CHFpEF exacerbation, and obstructive sleep apnea. He also suffered right rib fractures 2/2 ground-level fall. He has a complex medical history including COPD, CHF, CAD s/p CABG, paroxysmal atrial fibrillation, morbid obesity, DM2, anemia, HTN, HLD, and medical noncompliance.        1. Acute on Chronic Respiratory Failure with Hypoxia  - Two days of SOB, cough with sick contact  - Hx of COPD, CHF, mild intermittent asthma with multiple admissions for exacerbations of such  - Wheezes diminished, O2 requirement at baseline 2L  - BNP trending down slightly, 1084 today  - CXR stable     Plan:   - RT protocol   - Duoneb nebulizer four times daily, q4h prn   - Symbicort inhaler bid   - Albuterol inhaler 2 puff q4h prn   - Robitussin DM q6h  - Decrease IV solu-medrol from 125 to 40 bid; on day 4/5 of steroids  - Doxycycline 100mg po bid; discontinued ceftriaxone   - Continue Lasix 40mg IV bid  - Continuous pulse ox; titrate nasal O2 to maintain SpO2 88-92%     2. Rib Fractures  3. Pneumothorax - resolved  - 2/2 Ground level fall from EMS gurney  - XR showed acute fractures of the anterolateral aspects of the right fifth through eighth ribs and small right apical pneumothorax  - Pain improved, patient  only required 2mg of morphine in last 24 hours  - CXR today stable     Plan:   - Surgery continues to follow patient, appreciate their input  - Pain control:    - Lidocaine patch              - Tylenol 1g q8h   - Tramadol 100 q8h              - Morphine 2mg IV q3h prn for severe pain  - Encourage use of IS    4. Leukocytosis  - Chronic in nature  - WBC 24 today with neutrophilia  - Increase today like due to steroids for COPD exacerbation; chronic nature likely secondary to stress  - Infection unlikely; patient afebrile and procalcitonin WNL  - Doxy as above     5. Anemia, microcytic  - Previous admissions for GI bleed from unclear origin - ?diverticular bleed  - Last admission this month diagnosed with iron-deficiency anemia  - Colonoscopy and EGD in July '18 revealed no source of the bleeding  - Continue Ferrous sulfate 325 daily        Chronic Issues:     6. Congestive Heart Failure with Preserved Ejection Fraction  - Last echo 10/10/18: LVEF 55%  - Lasix as above  - Continue home spironolactone 25 daily     7. Paroxysmal Atrial Fibrillation  - Anticoagulation was held a few months ago due to recent GI bleed  - On Heparin 5000u q8hg for DVT prophylaxis  - Baseline PT 17.3, INR 1.41  - Started warfarin by protocol  - Continue home amiodarone 200mg daily and metoprolol sr 25mg daily    8. CKD, stage III  - eGFR of 46 this am with BUN 36, creatinine 1.56  - Lasix as above okay  - Will continue to follow     11. Essential HTN  - -153 overnight   - Continue home metoprolol sr 25mg daily     12. Hyperlipidemia  - Lipids: total 77, trigs 72, HDL 23, LDL 40  - Continue home rosuvastatin 40 daily    12. DM2  - A1C 5.6 on 10/28/2018  - Glucose overnight 164; likely due to steroids for COPD exacerbation  - Follow-up with PCP

## 2018-10-30 NOTE — THERAPY
"Physical Therapy Evaluation completed.   Transfers: Sit to Stand: Moderate Assist (to stand from lower surface)  Gait: Level Of Assist: Stand by Assist with No Equipment Needed       Plan of Care: Patient with no further skilled PT needs in the acute care setting at this time  Discharge Recommendations: Equipment: No Equipment Needed. See below    After initial evaluation pt has no further skilled acute PT needs. He is able to demonstrate short distance ambulation with no AD with SBA. He reports no concerns with functional abilty to dc to home once medically cleared and reportsd spouse and dtr will be able to assist with IADls and transport as needed. Anticiapte no immediate PT needs after dc to home and pt will likely self progress with increased OOB time as co-morbidities clear. (he did need assist to sit from very low sofa surface though pt reports he would not sit on this low of surface at home/baseline)    See \"Rehab Therapy-Acute\" Patient Summary Report for complete documentation.     "

## 2018-10-30 NOTE — DISCHARGE PLANNING
"Patient LACE Score = 78  - Previous admissions: SOBx2, GIB,GLF     Pt sitting at the edge of the bed, A&Ox4. Pt oxygen is weaned down to 2L which is baseline home O2 usage. Pt is aware of current hospital admission, states \"I ran out of my inhaler and my nebulizer bullets\" and feels that had he had access to his medications, he would not have been hospitalized. Pt reports that he is having a difficult time with insurance and his pharmacy providing the medications. Pt is willing to change pharmacy in order to get medications promptly. Pt also says that he has to use his inhaler three time per day with 2 doses per use. Pt only has prescription for 60 doses a month but is currently using 180 doses per month. Pt is also on Symbicort. ROBERTA RN to follow up with physician on discharge prescription for inhalers. Pt has CHF and DC RN asked about current understanding of HF. Pt is very familiar about HF but was unable to attend cardiac rehab due to lack of transportation. DC RN will follow up with  on available medical transportation for patient.     Follow Up:  Up date pharmacy to [facility] [address] [place] [address].   Follow up with physician and BS RN on inhaler use and prescription   SW for medical transportation       "

## 2018-10-30 NOTE — PROGRESS NOTES
Internal Medicine Interval Note  Note Author: Leandro Weber M.D.     Name Joshua Medrano     1965   Age/Sex 52 y.o. male   MRN [id number]   Code Status Full     After 5PM or if no immediate response to page, please call for cross-coverage  Attending/Team: Margarita/Dr. Gilmore See Patient List for primary contact information  Call [phone] to page    1st Call - Day Intern (R1):   Tenzin 2nd Call - Day Sr. Resident (R2/R3):   Tia         Reason for interval visit  (Principal Problem)   Exacerbation of CHF  Exacerbation of COPD  Rib fractures      Interval Problem Daily Status Update  (24 hours, problem oriented, brief subjective history, new lab/imaging data pertinent to that problem)   Patient is feeling well  Shortness of breath has significantly improved.  He is requesting to come lab draws due to difficult stick and pain.  He has been able to ambulate in the hallways without significant difficulty.  Kidney function is close to the baseline is still making adequate amount of urine.    Review of Systems   Constitutional: Negative for chills and fever.   HENT: Positive for congestion. Negative for sore throat.    Eyes: Negative for blurred vision.   Respiratory: Positive for sputum production and wheezing.    Cardiovascular: Positive for leg swelling and PND. Negative for chest pain.   Gastrointestinal: Negative for abdominal pain, nausea and vomiting.   Genitourinary: Negative for dysuria.   Musculoskeletal: Positive for myalgias.   Neurological: Negative for focal weakness and weakness.   Psychiatric/Behavioral: The patient is not nervous/anxious.         Disposition/Barriers to discharge:   Inpatient, undergoing diuresis and analgesia for rib fractures. Anticipate discharge soon if continues to progress in right direction.     Consultants/Specialty  Trauma surgery     PCP: Nam Le M.D.      Quality Measures  Quality-Core Measures   Reviewed items::  Medications reviewed, Labs reviewed  and Radiology images reviewed  Ceballos catheter::  No Ceballos  DVT prophylaxis pharmacological::  Warfarin (Coumadin)          Physical Exam       Vitals:    10/30/18 0751 10/30/18 0812 10/30/18 1135 10/30/18 1143   BP: 153/82   119/68   Pulse: 80 75 71 73   Resp: 16 20 18 16   Temp: 37.2 °C (99 °F)   36.9 °C (98.5 °F)   SpO2: 94% 93% 93% 94%   Weight:       Height:         Body mass index is 43.82 kg/m². Weight: (!) 167.6 kg (369 lb 7.9 oz)  Oxygen Therapy:  Pulse Oximetry: 94 %, O2 (LPM): 2.5, O2 Delivery: Silicone Nasal Cannula    Physical Exam   Constitutional: He is oriented to person, place, and time and well-developed, well-nourished, and in no distress. No distress.   Eyes: Conjunctivae are normal.   Neck: Neck supple.   Cardiovascular: Normal rate, regular rhythm and normal heart sounds.    No murmur heard.  Pulmonary/Chest: Effort normal. He has wheezes.   Scattered wheezing, fine inspiratory crackles   Abdominal: Soft. Bowel sounds are normal. He exhibits no distension.   Musculoskeletal: Normal range of motion. He exhibits edema.   1-2+ bilateral lower extremity edema with chronic venous stasis skin changes   Neurological: He is alert and oriented to person, place, and time. No cranial nerve deficit.   Skin: Skin is warm and dry. He is not diaphoretic.   Psychiatric: Affect normal.             Assessment/Plan     * Acute on chronic respiratory failure (HCC)- (present on admission)   Assessment & Plan    COPD exacerbation vs HFpEF vs SHASHANK   Improving SOB, PND, Orthopnea, bilateral lower leg edema.  Oxygen requirement down to baseline at 2 L/min    Plan  Continue Lasix to 40 IV twice daily, monitor BUN/creatinine  Continues scheduled duo nebs  Reduced IV steroids 40 IV twice daily   respiratory therapy per protocol  Stop antibiotics today.            Pneumothorax- (present on admission)   Assessment & Plan    Pt suffered from Rib fracture 5-8  -pneumothorax seems to have resolved    Plan  Continue O2  Tramadol  Q8  Morphine PRN  RT per protocal  Incentive spirometry           Closed fracture of multiple ribs of right side- (present on admission)   Assessment & Plan    Right fifth to eighth rib fracture.  Also has a small pneumothorax, no longer detectable on follow-up chest x-rays.  Pain under well controlled with tramadol and scheduled Tylenol.  Has not needed IV morphine in the last 24 hours.  Able to pull 2000 on incentive spirometry.          Heart failure with preserved ejection fraction, borderline, class IV (HCC)- (present on admission)   Assessment & Plan    Prior echo with EF of 55%.  Worsening BNP with HFpEF. Improved Pneumothorax on CXR however, pulm edema/infiltrate continues  SOB improving  -Pt's O2 requirements have come down to 2L  -Continue gentle diuresis  -Lasix   -Home Amiodarone  -Metoprolol XR  -Spironolactone             Chronic obstructive pulmonary disease with acute exacerbation (HCC)- (present on admission)   Assessment & Plan    Pt is a current smoker with 30 Pack smoking hx with home O2 on 2L NC  Pt presented with SOB exacerbation secondary to possible infection vs CHF  Procal WNL  Elevated WBC, however this is chronic for pt. Medrol contributing to elevated WBC    Plan  See section on acute hypoxic respiratory failure        Morbid obesity with BMI of 40.0-44.9, adult (HCC)- (present on admission)   Assessment & Plan    BMI of 43.74  Pt education at discharge          Microcytic anemia- (present on admission)   Assessment & Plan    Pt has hx of Microcytic anemia  Pt's baseline Hgb is around 7, stable  Hx of GI blood loss  Low iron at admission  Hx of Intermittent Hematochezia and Melena in the last 3-6 months    Ferrous Sulfate  CTF  First Occult blood neg. Pending second Occult Blood            SHASHANK (obstructive sleep apnea)- (present on admission)   Assessment & Plan    Pt is morbidly obese with Mallampatti score of 3-4  Pt reports orthopnea and PND(improving)   Pt O2 requirement have come  down to 2L   Out pt Sleep Study  F/U with PCP        CAD (coronary artery disease)- (present on admission)   Assessment & Plan    Hx of Ischemic heart disease with bypass surgery in 2017    Home Amiodarone  Home Metoprolol SR  Home Spironolactone          Acute exacerbation of CHF (congestive heart failure) ([facility])- (present on admission)   Assessment & Plan    Likely biventricular, HFpEF with cor pulmonale. BNP is trending down, patient is feeling much better.  Lung sounds clearer.  Able to pull 2000 on incentive spirometry.    Plan:  Continue Lasix 40 IV bid  Monitor kidney function              Forearm abrasion, right, initial encounter- (present on admission)   Assessment & Plan    During transport to ED patient unfortunately fell along with the gurney causing right forearm abrasions.  -Edema without fractures of the right forearm, no improving    Wound care  Pain control with tramadol Q8hrs  As needed morphine 2 mg        Dyslipidemia- (present on admission)   Assessment & Plan    Pt morbidly obese with hx of Dyslipidemia    LDL 40    On Crestor 40 mg          CKD (chronic kidney disease), stage III ([facility])- (present on admission)   Assessment & Plan    GFR of 45, Cr 1.60 and BUN of 30  Hx of CKD    Gentle diuresis with Lasix  ctf          Cigarette nicotine dependence with nicotine-induced disorder- (present on admission)   Assessment & Plan    Pt is is current smoker of 1-2 cigarrets/day with 30 pack smoking history  Pt reports he will stop smoking after this hospital stay  Will discuss with pt at time of discharge    Nicotine patch        Paroxysmal atrial fibrillation (HCC)- (present on admission)   Assessment & Plan    Pt has hx of paraxysmal A-fib  Started warfarin 10/29  Wife reports pt was not on Rivaraxiban due to financial reasons        Type 2 diabetes mellitus with complication, without long-term current use of insulin ([facility])- (present on admission)   Assessment & Plan    Pt is a morbidly obese male with  BMI of 43.74    HgbA1C 5.9 on 3/28/2018

## 2018-10-30 NOTE — PROGRESS NOTES
Inpatient Anticoagulation Service Note    Date: 10/30/2018  Reason for Anticoagulation: Atrial Fibrillation, Stroke   [id number] VASc Score: 5    Hemoglobin Value: (!) 7.4  Hematocrit Value: (!) 27.6  Lab Platelet Value: 261  Target INR: 2.0 to 3.0    INR from last 7 days     Date/Time INR Value    10/30/18 0746 (!)  1.41    10/29/18 1645 (!)  1.39        Dose from last 7 days     Date/Time Dose (mg)    10/30/18 1400  7.5    10/29/18 1600  7.5        Average Dose (mg):  (Previously at [facility] (04/2018): 5mg M/W & 10mg AOD)  Significant Interactions: Amiodarone, Antibiotics, Corticosteroids, Statin (Spironolactone, Fe tabs)  Bridge Therapy: No (Heparin 5000SC q8h)     Reversal Agent Administered: Not Applicable  Comments: INR subtherapeutic as expected given one dose of warfarin. Continue current dose. H/H remains low but stable with no indication of bleeding. No new DDI.     Plan:  Warfarin 7.5mg with INR check tomorrow  Education Material Provided?: No (Recommend re-education prior to discharge)  Pharmacist suggested discharge dosing: Warfarin 7.5mg PO daily with close f/u within 3 days       Norma Vail, PharmD., BCPS

## 2018-10-30 NOTE — PROGRESS NOTES
Received report from night shift RN Robbi. Assumed care of patient. Patient is SR on the monitor. Patient is resting in bed with no family present at bedside at this time. Patient declines any needs at this time. Plan of care discussed with patient. Bed locked and in the lowest position with call light within reach.

## 2018-10-31 ENCOUNTER — APPOINTMENT (OUTPATIENT)
Dept: RADIOLOGY | Facility: MEDICAL CENTER | Age: 53
DRG: 291 | End: 2018-10-31
Attending: SURGERY
Payer: OTHER MISCELLANEOUS

## 2018-10-31 ENCOUNTER — PATIENT OUTREACH (OUTPATIENT)
Dept: HEALTH INFORMATION MANAGEMENT | Facility: OTHER | Age: 53
End: 2018-10-31

## 2018-10-31 VITALS
WEIGHT: 315 LBS | OXYGEN SATURATION: 93 % | BODY MASS INDEX: 37.19 KG/M2 | SYSTOLIC BLOOD PRESSURE: 100 MMHG | HEIGHT: 77 IN | HEART RATE: 80 BPM | TEMPERATURE: 98.3 F | RESPIRATION RATE: 18 BRPM | DIASTOLIC BLOOD PRESSURE: 59 MMHG

## 2018-10-31 PROCEDURE — 71045 X-RAY EXAM CHEST 1 VIEW: CPT

## 2018-10-31 PROCEDURE — A9270 NON-COVERED ITEM OR SERVICE: HCPCS | Performed by: STUDENT IN AN ORGANIZED HEALTH CARE EDUCATION/TRAINING PROGRAM

## 2018-10-31 PROCEDURE — A9270 NON-COVERED ITEM OR SERVICE: HCPCS | Performed by: INTERNAL MEDICINE

## 2018-10-31 PROCEDURE — 97165 OT EVAL LOW COMPLEX 30 MIN: CPT

## 2018-10-31 PROCEDURE — G8988 SELF CARE GOAL STATUS: HCPCS | Mod: CI

## 2018-10-31 PROCEDURE — 700111 HCHG RX REV CODE 636 W/ 250 OVERRIDE (IP): Performed by: HOSPITALIST

## 2018-10-31 PROCEDURE — 99239 HOSP IP/OBS DSCHRG MGMT >30: CPT | Mod: GC | Performed by: INTERNAL MEDICINE

## 2018-10-31 PROCEDURE — 700102 HCHG RX REV CODE 250 W/ 637 OVERRIDE(OP): Performed by: HOSPITALIST

## 2018-10-31 PROCEDURE — 94640 AIRWAY INHALATION TREATMENT: CPT

## 2018-10-31 PROCEDURE — G8989 SELF CARE D/C STATUS: HCPCS | Mod: CI

## 2018-10-31 PROCEDURE — 94668 MNPJ CHEST WALL SBSQ: CPT

## 2018-10-31 PROCEDURE — 700102 HCHG RX REV CODE 250 W/ 637 OVERRIDE(OP): Performed by: STUDENT IN AN ORGANIZED HEALTH CARE EDUCATION/TRAINING PROGRAM

## 2018-10-31 PROCEDURE — 94760 N-INVAS EAR/PLS OXIMETRY 1: CPT

## 2018-10-31 PROCEDURE — G8987 SELF CARE CURRENT STATUS: HCPCS | Mod: CI

## 2018-10-31 PROCEDURE — 700102 HCHG RX REV CODE 250 W/ 637 OVERRIDE(OP): Performed by: INTERNAL MEDICINE

## 2018-10-31 PROCEDURE — A9270 NON-COVERED ITEM OR SERVICE: HCPCS | Performed by: HOSPITALIST

## 2018-10-31 PROCEDURE — 700101 HCHG RX REV CODE 250: Performed by: INTERNAL MEDICINE

## 2018-10-31 RX ORDER — CALCIUM CARBONATE 500 MG/1
500 TABLET, CHEWABLE ORAL 2 TIMES DAILY PRN
Qty: 30 TAB | Refills: 0 | Status: SHIPPED | OUTPATIENT
Start: 2018-10-31 | End: 2019-01-04

## 2018-10-31 RX ORDER — IPRATROPIUM BROMIDE AND ALBUTEROL SULFATE 2.5; .5 MG/3ML; MG/3ML
3 SOLUTION RESPIRATORY (INHALATION) EVERY 6 HOURS PRN
Qty: 180 BULLET | Refills: 10 | Status: ON HOLD | OUTPATIENT
Start: 2018-10-31 | End: 2020-12-08

## 2018-10-31 RX ORDER — FUROSEMIDE 40 MG/1
40 TABLET ORAL DAILY
Qty: 30 TAB | Refills: 2 | Status: ON HOLD | OUTPATIENT
Start: 2018-10-31 | End: 2018-11-10

## 2018-10-31 RX ORDER — ALBUTEROL SULFATE 90 UG/1
2 AEROSOL, METERED RESPIRATORY (INHALATION) EVERY 6 HOURS PRN
Qty: 1 INHALER | Refills: 15 | Status: SHIPPED | OUTPATIENT
Start: 2018-10-31 | End: 2019-08-15

## 2018-10-31 RX ORDER — POTASSIUM CHLORIDE 750 MG/1
10 TABLET, EXTENDED RELEASE ORAL DAILY
Qty: 30 TAB | Refills: 1 | Status: SHIPPED | OUTPATIENT
Start: 2018-10-31 | End: 2019-01-04 | Stop reason: SDUPTHER

## 2018-10-31 RX ORDER — ACETAMINOPHEN 500 MG
1000 TABLET ORAL EVERY 8 HOURS
Qty: 30 TAB | Refills: 0 | Status: SHIPPED | OUTPATIENT
Start: 2018-10-31 | End: 2019-08-15

## 2018-10-31 RX ORDER — IPRATROPIUM BROMIDE AND ALBUTEROL SULFATE 2.5; .5 MG/3ML; MG/3ML
3 SOLUTION RESPIRATORY (INHALATION) EVERY 6 HOURS PRN
Qty: 60 BULLET | Refills: 10 | Status: SHIPPED | OUTPATIENT
Start: 2018-10-31 | End: 2018-10-31

## 2018-10-31 RX ORDER — CEFDINIR 300 MG/1
300 CAPSULE ORAL 2 TIMES DAILY
Qty: 10 CAP | Refills: 0 | Status: SHIPPED | OUTPATIENT
Start: 2018-10-31 | End: 2018-11-05

## 2018-10-31 RX ORDER — FUROSEMIDE 20 MG/1
40 TABLET ORAL 2 TIMES DAILY
Qty: 28 TAB | Refills: 0 | Status: ON HOLD | OUTPATIENT
Start: 2018-10-31 | End: 2018-11-10

## 2018-10-31 RX ORDER — WARFARIN SODIUM 7.5 MG/1
7.5 TABLET ORAL
Qty: 30 TAB | Refills: 0 | Status: SHIPPED | OUTPATIENT
Start: 2018-10-31 | End: 2018-10-31

## 2018-10-31 RX ORDER — IPRATROPIUM BROMIDE AND ALBUTEROL SULFATE 2.5; .5 MG/3ML; MG/3ML
3 SOLUTION RESPIRATORY (INHALATION) EVERY 6 HOURS PRN
Qty: 180 BULLET | Refills: 10 | Status: SHIPPED | OUTPATIENT
Start: 2018-10-31 | End: 2018-10-31

## 2018-10-31 RX ORDER — GUAIFENESIN 600 MG/1
600 TABLET, EXTENDED RELEASE ORAL EVERY 12 HOURS
Qty: 28 TAB | Refills: 5 | Status: SHIPPED | OUTPATIENT
Start: 2018-10-31 | End: 2019-01-04

## 2018-10-31 RX ORDER — FERROUS SULFATE 325(65) MG
325 TABLET ORAL
Qty: 30 TAB | Refills: 3 | Status: SHIPPED | OUTPATIENT
Start: 2018-11-01 | End: 2019-08-15

## 2018-10-31 RX ORDER — TIOTROPIUM BROMIDE 18 UG/1
18 CAPSULE ORAL; RESPIRATORY (INHALATION) DAILY
Qty: 30 CAP | Refills: 5 | Status: SHIPPED | OUTPATIENT
Start: 2018-10-31 | End: 2019-08-15

## 2018-10-31 RX ORDER — PREDNISONE 10 MG/1
10 TABLET ORAL DAILY
Qty: 42 TAB | Refills: 0 | Status: SHIPPED | OUTPATIENT
Start: 2018-10-31 | End: 2018-11-12

## 2018-10-31 RX ORDER — POTASSIUM CHLORIDE 20 MEQ/1
20 TABLET, EXTENDED RELEASE ORAL DAILY
Qty: 7 TAB | Refills: 0 | Status: ON HOLD | OUTPATIENT
Start: 2018-10-31 | End: 2018-11-10

## 2018-10-31 RX ORDER — TRAMADOL HYDROCHLORIDE 50 MG/1
50 TABLET ORAL EVERY 6 HOURS PRN
Qty: 20 TAB | Refills: 0 | Status: SHIPPED | OUTPATIENT
Start: 2018-10-31 | End: 2018-11-05

## 2018-10-31 RX ORDER — WARFARIN SODIUM 5 MG/1
5 TABLET ORAL
Qty: 30 TAB | Refills: 0 | Status: ON HOLD | OUTPATIENT
Start: 2018-10-31 | End: 2018-11-10

## 2018-10-31 RX ADMIN — GUAIFENESIN AND DEXTROMETHORPHAN 10 ML: 100; 10 SYRUP ORAL at 05:30

## 2018-10-31 RX ADMIN — IPRATROPIUM BROMIDE AND ALBUTEROL SULFATE 3 ML: .5; 3 SOLUTION RESPIRATORY (INHALATION) at 06:43

## 2018-10-31 RX ADMIN — FERROUS SULFATE TAB 325 MG (65 MG ELEMENTAL FE) 325 MG: 325 (65 FE) TAB at 05:30

## 2018-10-31 RX ADMIN — IPRATROPIUM BROMIDE AND ALBUTEROL SULFATE 3 ML: .5; 3 SOLUTION RESPIRATORY (INHALATION) at 04:31

## 2018-10-31 RX ADMIN — METOPROLOL SUCCINATE 25 MG: 25 TABLET, EXTENDED RELEASE ORAL at 05:30

## 2018-10-31 RX ADMIN — IPRATROPIUM BROMIDE AND ALBUTEROL SULFATE 3 ML: .5; 3 SOLUTION RESPIRATORY (INHALATION) at 10:22

## 2018-10-31 RX ADMIN — TRAMADOL HYDROCHLORIDE 100 MG: 50 TABLET, FILM COATED ORAL at 05:30

## 2018-10-31 RX ADMIN — FUROSEMIDE 40 MG: 10 INJECTION, SOLUTION INTRAMUSCULAR; INTRAVENOUS at 05:25

## 2018-10-31 RX ADMIN — ROSUVASTATIN CALCIUM 40 MG: 20 TABLET, FILM COATED ORAL at 05:30

## 2018-10-31 RX ADMIN — ACETAMINOPHEN 1000 MG: 500 TABLET, FILM COATED ORAL at 05:30

## 2018-10-31 RX ADMIN — HEPARIN SODIUM 5000 UNITS: 5000 INJECTION, SOLUTION INTRAVENOUS; SUBCUTANEOUS at 14:23

## 2018-10-31 RX ADMIN — TRAMADOL HYDROCHLORIDE 100 MG: 50 TABLET, FILM COATED ORAL at 14:23

## 2018-10-31 RX ADMIN — AMIODARONE HYDROCHLORIDE 200 MG: 200 TABLET ORAL at 05:30

## 2018-10-31 RX ADMIN — SPIRONOLACTONE 25 MG: 25 TABLET ORAL at 05:30

## 2018-10-31 RX ADMIN — BUDESONIDE AND FORMOTEROL FUMARATE DIHYDRATE 2 PUFF: 160; 4.5 AEROSOL RESPIRATORY (INHALATION) at 06:43

## 2018-10-31 RX ADMIN — HEPARIN SODIUM 5000 UNITS: 5000 INJECTION, SOLUTION INTRAVENOUS; SUBCUTANEOUS at 05:25

## 2018-10-31 RX ADMIN — GUAIFENESIN 600 MG: 600 TABLET, EXTENDED RELEASE ORAL at 05:30

## 2018-10-31 RX ADMIN — METHYLPREDNISOLONE SODIUM SUCCINATE 40 MG: 40 INJECTION, POWDER, FOR SOLUTION INTRAMUSCULAR; INTRAVENOUS at 05:25

## 2018-10-31 RX ADMIN — IPRATROPIUM BROMIDE AND ALBUTEROL SULFATE 3 ML: .5; 3 SOLUTION RESPIRATORY (INHALATION) at 14:14

## 2018-10-31 RX ADMIN — ACETAMINOPHEN 1000 MG: 500 TABLET, FILM COATED ORAL at 14:23

## 2018-10-31 RX ADMIN — IPRATROPIUM BROMIDE AND ALBUTEROL SULFATE 3 ML: .5; 3 SOLUTION RESPIRATORY (INHALATION) at 00:11

## 2018-10-31 ASSESSMENT — COGNITIVE AND FUNCTIONAL STATUS - GENERAL
TOILETING: A LITTLE
SUGGESTED CMS G CODE MODIFIER DAILY ACTIVITY: CJ
DAILY ACTIVITIY SCORE: 21
HELP NEEDED FOR BATHING: A LITTLE
DRESSING REGULAR LOWER BODY CLOTHING: A LITTLE

## 2018-10-31 ASSESSMENT — ENCOUNTER SYMPTOMS
HEADACHES: 0
COUGH: 1
POLYDIPSIA: 0
BLURRED VISION: 0
CHILLS: 0
DIZZINESS: 0
MYALGIAS: 0
FEVER: 0
NECK PAIN: 0
PALPITATIONS: 0
DEPRESSION: 0
NAUSEA: 0
HEARTBURN: 0
DOUBLE VISION: 0

## 2018-10-31 ASSESSMENT — PAIN SCALES - GENERAL
PAINLEVEL_OUTOF10: 3
PAINLEVEL_OUTOF10: 3

## 2018-10-31 ASSESSMENT — LIFESTYLE VARIABLES: SUBSTANCE_ABUSE: 0

## 2018-10-31 ASSESSMENT — ACTIVITIES OF DAILY LIVING (ADL): TOILETING: INDEPENDENT

## 2018-10-31 NOTE — PROGRESS NOTES
Internal Medicine Interval Note  Note Author: Romario Dave M.D.     Name Joshua Medrano     1965   Age/Sex 52 y.o. male   MRN [id number]   Code Status Full Code     After 5PM or if no immediate response to page, please call for cross-coverage  Attending/Team: Mando/Margarita See Patient List for primary contact information  Call [phone] to page    1st Call - Day Intern (R1):   Tenzin 2nd Call - Day Sr. Resident (R2/R3):   Tia         Reason for interval visit  (Principal Problem)   COPD exacerbation vs CHF exacerbation      Interval Problem Daily Status Update  (24 hours, problem oriented, brief subjective history, new lab/imaging data pertinent to that problem)   Pt was seen at bedside resting comfortably.  Patient reports complete resolution of the rib pain.  He reports feeling back to his baseline.  Denies SOB with 2 L of O2.  Denies chest pain, palpitations, dizziness, lightheadedness      Review of Systems   Constitutional: Negative for chills and fever.   HENT: Negative for hearing loss and tinnitus.    Eyes: Negative for blurred vision and double vision.   Respiratory: Positive for cough (yellow sputum).    Cardiovascular: Negative for chest pain and palpitations. Leg swelling: Back to baseline.   Gastrointestinal: Negative for heartburn and nausea.   Genitourinary: Negative for dysuria and urgency.   Musculoskeletal: Negative for myalgias and neck pain.   Skin: Negative for itching and rash.   Neurological: Negative for dizziness and headaches.   Endo/Heme/Allergies: Negative for environmental allergies and polydipsia.   Psychiatric/Behavioral: Negative for depression and substance abuse.       Disposition/Barriers to discharge:   COPD exacerbation vs CHF failure     Consultants/Specialty  Trauma surgery    PCP: Nam Le M.D.      Quality Measures  Quality-Core Measures   Reviewed items::  Labs reviewed, Medications reviewed and Radiology images reviewed  Ceballos catheter::  No  Lin  DVT prophylaxis pharmacological::  Warfarin (Coumadin) and Heparin          Physical Exam       Vitals:    10/31/18 1024 10/31/18 1041 10/31/18 1230 10/31/18 1414   BP:  125/72 100/59    Pulse: 76 74 71 80   Resp: 16 18 20 18   Temp:  37.1 °C (98.7 °F) 36.8 °C (98.3 °F)    SpO2:  94% 93%    Weight:       Height:         Body mass index is 43.19 kg/m². Weight: (!) 165.2 kg (364 lb 3.2 oz)  Oxygen Therapy:  Pulse Oximetry: 93 %, O2 (LPM): 2, FiO2%: 96 %, O2 Delivery: Silicone Nasal Cannula    Physical Exam   Constitutional: He is oriented to person, place, and time and well-developed, well-nourished, and in no distress. No distress.   Morbidly obese male     HENT:   Head: Normocephalic and atraumatic.   Mouth/Throat: Oropharynx is clear and moist. No oropharyngeal exudate.   Mallampati 3-4    Eyes: Pupils are equal, round, and reactive to light. Conjunctivae and EOM are normal. Right eye exhibits no discharge. Left eye exhibits no discharge.   Neck: Normal range of motion. Neck supple. No thyromegaly present.   Cardiovascular: Normal rate, regular rhythm and intact distal pulses.  Exam reveals no gallop.    Pulmonary/Chest: Effort normal and breath sounds normal. No respiratory distress. He has no wheezes.   Abdominal: Soft. Bowel sounds are normal. He exhibits distension. There is no tenderness.   Musculoskeletal: Normal range of motion. He exhibits edema (edematous legs due to obesity). He exhibits no deformity.   Neurological: He is alert and oriented to person, place, and time. No cranial nerve deficit. Coordination normal. GCS score is 15.   Skin: Skin is warm and dry. No rash noted. He is not diaphoretic. No erythema.   Psychiatric: Mood, memory, affect and judgment normal.       Assessment/Plan     * Acute on chronic respiratory failure (HCC)- (present on admission)   Assessment & Plan    COPD exacerbation vs HFpEF vs SHASHANK   Improving SOB, PND, Orthopnea, bilateral lower leg edema.  Oxygen requirement  down to baseline at 2 L/min    Plan  Continue Lasix to 40 PO  Continues scheduled duo nebs  Prednisone  respiratory therapy per protocol  Cefdinir        Pneumothorax- (present on admission)   Assessment & Plan    Pt suffered from Rib fracture 5-8  -pneumothorax seems to have resolved  -Pt denies any pain   -d/c today    Plan  Continue O2  Tramadol   Morphine PRN  RT [facility]  Incentive spirometry           Closed fracture of multiple ribs of right side- (present on admission)   Assessment & Plan    Right fifth to eighth rib fracture.   Pneumothorax, resolved  Pt controlled with tramadol and scheduled Tylenol.  Has not needed IV morphine in the last 24 hours.  Able to pull 2000 on incentive spirometry.          Heart failure with preserved ejection fraction, borderline, class IV (HCC)- (present on admission)   Assessment & Plan    Prior echo with EF of 55%, HEFpEF      -Pt's O2 requirements have come down to 2L, baseline for pt  -Lasix   -Home Amiodarone  -Metoprolol XR  -Spironolactone             Chronic obstructive pulmonary disease with acute exacerbation (HCC)- (present on admission)   Assessment & Plan    Pt is a current smoker with 30 Pack smoking hx with home O2 on 2L NC  Pt presented with SOB exacerbation secondary to possible infection vs CHF  Procal WNL  Elevated WBC, however this is chronic for pt. Medrol contributing to elevated WBC    Plan  Resp therapy per [facility]  Symbicort  Duoneb   Medrol switched to prednisone  Cefdinir        Morbid obesity with BMI of 40.0-44.9, adult (HCC)- (present on admission)   Assessment & Plan    BMI of 43.74  Pt education at discharge          Microcytic anemia- (present on admission)   Assessment & Plan    Pt has hx of Microcytic anemia  Pt's baseline Hgb is around 7, stable  Low iron at admission    Ferrous Sulfate  Neg occult blood            SHASHANK (obstructive sleep apnea)- (present on admission)   Assessment & Plan    Pt is morbidly obese with Mallampatti score  of 3-4  Pt reports orthopnea and PND(improving)   Pt O2 requirement have come down to 2L   Out pt Sleep Study  F/U with PCP for sleep study referral         CAD (coronary artery disease)- (present on admission)   Assessment & Plan    Hx of Ischemic heart disease with bypass surgery in 2017    Home Amiodarone  Home Metoprolol SR  Home Spironolactone          Acute exacerbation of CHF (congestive heart failure) ([facility])- (present on admission)   Assessment & Plan    Likely biventricular, HFpEF with cor pulmonale. BNP is trending down, patient is feeling much better.  Lung sounds clearer.  Able to pull 2000 on incentive spirometry.    Plan:  Continue Lasix 40 IV bid  Monitor kidney function              Forearm abrasion, right, initial encounter- (present on admission)   Assessment & Plan    During transport to ED patient unfortunately fell along with the gurney causing right forearm abrasions.     -Edema improving, no fractures  Wound care  Pain control with tramadol   As needed morphine 2 mg        Dyslipidemia- (present on admission)   Assessment & Plan    Pt morbidly obese with hx of Dyslipidemia    LDL 40    On Crestor 40 mg          CKD (chronic kidney disease), stage III ([facility])- (present on admission)   Assessment & Plan    GFR of 45, Cr 1.60 and BUN of 30  Hx of CKD    Gentle diuresis with Lasix  ctf          Cigarette nicotine dependence with nicotine-induced disorder- (present on admission)   Assessment & Plan    Pt is is current smoker of 1-2 cigarrets/day with 30 pack smoking history  Pt reports he will stop smoking after this hospital stay  Will discuss with pt at time of discharge    Nicotine patch        Paroxysmal atrial fibrillation (HCC)- (present on admission)   Assessment & Plan    Pt has hx of paraxysmal A-fib  Started warfarin 10/29  Continue with anticoagulation clinic        Type 2 diabetes mellitus with complication, without long-term current use of insulin ([facility])- (present on admission)    Assessment & Plan    Pt is a morbidly obese male with BMI of 43.74    HgbA1C 5.9 on 3/28/2018

## 2018-10-31 NOTE — DISCHARGE PLANNING
[facility] wrote a new prescription with a different MD who is medicaid eligible. ROBERTA RN called pharmacy to verify that had received new RX and if they could swap out rx's so the patient could go home with 180 bullets so they would last him until his primary care appointment.

## 2018-10-31 NOTE — DISCHARGE INSTRUCTIONS
Discharge Instructions    Discharged to home by car with self. Discharged via wheelchair, hospital escort: Yes.  Special equipment needed: Not Applicable    Be sure to schedule a follow-up appointment with your primary care doctor or any specialists as instructed.     Discharge Plan:   Diet Plan: Discussed  Activity Level: Discussed  Smoking Cessation Offered: Patient Refused  Confirmed Follow up Appointment: Appointment Scheduled  Confirmed Symptoms Management: Discussed  Medication Reconciliation Updated: Yes  Pneumococcal Vaccine Administered/Refused: Not given - Patient refused pneumococcal vaccine  Influenza Vaccine Indication: Patient Refuses    I understand that a diet low in cholesterol, fat, and sodium is recommended for good health. Unless I have been given specific instructions below for another diet, I accept this instruction as my diet prescription.   Other diet: cardiac    Special Instructions:   HF Patient Discharge Instructions  · Monitor your weight daily, and maintain a weight chart, to track your weight changes.   · Activity as tolerated, unless your Doctor has ordered otherwise. Other activity order: NA.  · Follow a low fat, low cholesterol, low salt diet unless instructed otherwise by your Doctor. Read the labels on the back of food products and track your intake of fat, cholesterol and salt.   · Fluid Restriction No. If a Fluid Restriction has been ordered by your Doctor, measure fluids with a measuring cup to ensure that you are not exceeding the restriction.   · No smoking.  · Oxygen Yes. If your Doctor has ordered that you wear Oxygen at home, it is important to wear it as ordered.  · Did you receive an explanation from staff on the importance of taking each of your medications and why it is necessary to keep taking them unless your doctor says to stop? Yes  · Were all of your questions answered about how to manage your heart failure and what to do if you have increased signs and symptoms  after you go home? Yes  · Do you feel like your heart failure care team involved you in the care treatment plan and allowed you to make decisions regarding your care while in the hospital and addressed any discharge needs you might have? Yes    See the educational handout provided at discharge for more information on monitoring your daily weight, activity and diet. This also explains more about Heart Failure, symptoms of a flare-up and some of the tests that you have undergone.     Warning Signs of a Flare-Up include:  · Swelling in the ankles or lower legs.  · Shortness of breath, while at rest, or while doing normal activities.   · Shortness of breath at night when in bed, or coughing in bed.   · Requiring more pillows to sleep at night, or needing to sit up at night to sleep.  · Feeling weak, dizzy or fatigued.     When to call your Doctor:  · Call [facility] seven days a week from 8:00 a.m. to 8:00 p.m. for medical questions [phone].  · Call your Primary Care Physician or Cardiologist if:   1. You experience any pain radiating to your jaw or neck.  2. You have any difficulty breathing.  3. You experience weight gain of 3 lbs in a day or 5 lbs in a week.   4. You feel any palpitations or irregular heartbeats.  5. You become dizzy or lose consciousness.   If you have had an angiogram or had a pacemaker or AICD placed, and experience:  1. Bleeding, drainage or swelling at the surgical / puncture site.  2. Fever greater than 100.0 F  3. Shock from internal defibrillator.  4. Cool and / or numb extremities.      · Is patient discharged on Warfarin / Coumadin?   Yes    You are receiving the drug warfarin. Please understand the importance of monitoring warfarin with scheduled PT/INR blood draws.  Follow-up with a call to your personal Doctor's office in 3 days to schedule a PT/INR.    IMPORTANT: HOW TO USE THIS INFORMATION:  This is a summary and does NOT have all possible information about this  "product. This information does not assure that this product is safe, effective, or appropriate for you. This information is not individual medical advice and does not substitute for the advice of your health care professional. Always ask your health care professional for complete information about this product and your specific health needs.      WARFARIN - ORAL (WARF-uh-rin)      COMMON BRAND NAME(S): Coumadin      WARNING:  Warfarin can cause very serious (possibly fatal) bleeding. This is more likely to occur when you first start taking this medication or if you take too much warfarin. To decrease your risk for bleeding, your doctor or other health care provider will monitor you closely and check your lab results (INR test) to make sure you are not taking too much warfarin. Keep all medical and laboratory appointments. Tell your doctor right away if you notice any signs of serious bleeding. See also Side Effects section.      USES:  This medication is used to treat blood clots (such as in deep vein thrombosis-DVT or pulmonary embolus-PE) and/or to prevent new clots from forming in your body. Preventing harmful blood clots helps to reduce the risk of a stroke or heart attack. Conditions that increase your risk of developing blood clots include a certain type of irregular heart rhythm (atrial fibrillation), heart valve replacement, recent heart attack, and certain surgeries (such as hip/knee replacement). Warfarin is commonly called a \"blood thinner,\" but the more correct term is \"anticoagulant.\" It helps to keep blood flowing smoothly in your body by decreasing the amount of certain substances (clotting proteins) in your blood.      HOW TO USE:  Read the Medication Guide provided by your pharmacist before you start taking warfarin and each time you get a refill. If you have any questions, ask your doctor or pharmacist. Take this medication by mouth with or without food as directed by your doctor or other health " care professional, usually once a day. It is very important to take it exactly as directed. Do not increase the dose, take it more frequently, or stop using it unless directed by your doctor. Dosage is based on your medical condition, laboratory tests (such as INR), and response to treatment. Your doctor or other health care provider will monitor you closely while you are taking this medication to determine the right dose for you. Use this medication regularly to get the most benefit from it. To help you remember, take it at the same time each day. It is important to eat a balanced, consistent diet while taking warfarin. Some foods can affect how warfarin works in your body and may affect your treatment and dose. Avoid sudden large increases or decreases in your intake of foods high in vitamin K (such as broccoli, cauliflower, cabbage, brussels sprouts, kale, spinach, and other green leafy vegetables, liver, green tea, certain vitamin supplements). If you are trying to lose weight, check with your doctor before you try to go on a diet. Cranberry products may also affect how your warfarin works. Limit the amount of cranberry juice (16 ounces/480 milliliters a day) or other cranberry products you may drink or eat.      SIDE EFFECTS:  Nausea, loss of appetite, or stomach/abdominal pain may occur. If any of these effects persist or worsen, tell your doctor or pharmacist promptly. Remember that your doctor has prescribed this medication because he or she has judged that the benefit to you is greater than the risk of side effects. Many people using this medication do not have serious side effects. This medication can cause serious bleeding if it affects your blood clotting proteins too much (shown by unusually high INR lab results). Even if your doctor stops your medication, this risk of bleeding can continue for up to a week. Tell your doctor right away if you have any signs of serious bleeding, including: unusual  pain/swelling/discomfort, unusual/easy bruising, prolonged bleeding from cuts or gums, persistent/frequent nosebleeds, unusually heavy/prolonged menstrual flow, pink/dark urine, coughing up blood, vomit that is bloody or looks like coffee grounds, severe headache, dizziness/fainting, unusual or persistent tiredness/weakness, bloody/black/tarry stools, chest pain, shortness of breath, difficulty swallowing. Tell your doctor right away if any of these unlikely but serious side effects occur: persistent nausea/vomiting, severe stomach/abdominal pain, yellowing eyes/skin. This drug rarely has caused very serious (possibly fatal) problems if its effects lead to small blood clots (usually at the beginning of treatment). This can lead to severe skin/tissue damage that may require surgery or amputation if left untreated. Patients with certain blood conditions (protein C or S deficiency) may be at greater risk. Get medical help right away if any of these rare but serious side effects occur: painful/red/purplish patches on the skin (such as on the toe, breast, abdomen), change in the amount of urine, vision changes, confusion, slurred speech, weakness on one side of the body. A very serious allergic reaction to this drug is rare. However, get medical help right away if you notice any symptoms of a serious allergic reaction, including: rash, itching/swelling (especially of the face/tongue/throat), severe dizziness, trouble breathing. This is not a complete list of possible side effects. If you notice other effects not listed above, contact your doctor or pharmacist. In the [place] - Call your doctor for medical advice about side effects. You may report side effects to FDA at 7-858-AMB-4698. In [place] - Call your doctor for medical advice about side effects. You may report side effects to [facility] [place] at 1-621.128.7102.      PRECAUTIONS:  Before taking warfarin, tell your doctor or pharmacist if you are allergic to it; or if you  have any other allergies. This product may contain inactive ingredients, which can cause allergic reactions or other problems. Talk to your pharmacist for more details. Before using this medication, tell your doctor or pharmacist your medical history, especially of: blood disorders (such as anemia, hemophilia), bleeding problems (such as bleeding of the stomach/intestines, bleeding in the brain), blood vessel disorders (such as aneurysms), recent major injury/surgery, liver disease, alcohol use, mental/mood disorders (including memory problems), frequent falls/injuries. It is important that all your doctors and dentists know that you take warfarin. Before having surgery or any medical/dental procedures, tell your doctor or dentist that you are taking this medication and about all the products you use (including prescription drugs, nonprescription drugs, and herbal products). Avoid getting injections into the muscles. If you must have an injection into a muscle (for example, a flu shot), it should be given in the arm. This way, it will be easier to check for bleeding and/or apply pressure bandages. This medication may cause stomach bleeding. Daily use of alcohol while using this medicine will increase your risk for stomach bleeding and may also affect how this medication works. Limit or avoid alcoholic beverages. If you have not been eating well, if you have an illness or infection that causes fever, vomiting, or diarrhea for more than 2 days, or if you start using any antibiotic medications, contact your doctor or pharmacist immediately because these conditions can affect how warfarin works. This medication can cause heavy bleeding. To lower the chance of getting cut, bruised, or injured, use great caution with sharp objects like safety razors and nail cutters. Use an electric razor when shaving and a soft toothbrush when brushing your teeth. Avoid activities such as contact sports. If you fall or injure yourself,  "especially if you hit your head, call your doctor immediately. Your doctor may need to check you. The Food & Drug Administration has stated that generic warfarin products are interchangeable. However, consult your doctor or pharmacist before switching warfarin products. Be careful not to take more than one medication that contains warfarin unless specifically directed by the doctor or health care provider who is monitoring your warfarin treatment. Older adults may be at greater risk for bleeding while using this drug. This medication is not recommended for use during pregnancy because of serious (possibly fatal) harm to an unborn baby. Discuss the use of reliable forms of birth control with your doctor. If you become pregnant or think you may be pregnant, tell your doctor immediately. If you are planning pregnancy, discuss a plan for managing your condition with your doctor before you become pregnant. Your doctor may switch the type of medication you use during pregnancy. Very small amounts of this medication may pass into breast milk but is unlikely to harm a nursing infant. Consult your doctor before breast-feeding.      DRUG INTERACTIONS:  Drug interactions may change how your medications work or increase your risk for serious side effects. This document does not contain all possible drug interactions. Keep a list of all the products you use (including prescription/nonprescription drugs and herbal products) and share it with your doctor and pharmacist. Do not start, stop, or change the dosage of any medicines without your doctor's approval. Warfarin interacts with many prescription, nonprescription, vitamin, and herbal products. This includes medications that are applied to the skin or inside the vagina or rectum. The interactions with warfarin usually result in an increase or decrease in the \"blood-thinning\" (anticoagulant) effect. Your doctor or other health care professional should closely monitor you to " prevent serious bleeding or clotting problems. While taking warfarin, it is very important to tell your doctor or pharmacist of any changes in medications, vitamins, or herbal products that you are taking. Some products that may interact with this drug include: capecitabine, imatinib, mifepristone. Aspirin, aspirin-like drugs (salicylates), and nonsteroidal anti-inflammatory drugs (NSAIDs such as ibuprofen, naproxen, celecoxib) may have effects similar to warfarin. These drugs may increase the risk of bleeding problems if taken during treatment with warfarin. Carefully check all prescription/nonprescription product labels (including drugs applied to the skin such as pain-relieving creams) since the products may contain NSAIDs or salicylates. Talk to your doctor about using a different medication (such as acetaminophen) to treat pain/fever. Low-dose aspirin and related drugs (such as clopidogrel, ticlopidine) should be continued if prescribed by your doctor for specific medical reasons such as heart attack or stroke prevention. Consult your doctor or pharmacist for more details. Many herbal products interact with warfarin. Tell your doctor before taking any herbal products, especially bromelains, coenzyme Q10, cranberry, danshen, dong quai, fenugreek, garlic, ginkgo biloba, ginseng, and Ronak's wort, among others. This medication may interfere with a certain laboratory test to measure theophylline levels, possibly causing false test results. Make sure laboratory personnel and all your doctors know you use this drug.      OVERDOSE:  If overdose is suspected, contact a poison control center or emergency room immediately. [place] residents can call the [place] National Poison Hotline at 1-788.286.5664. [place] residents can call a provincial poison control center. Symptoms of overdose may include: bloody/black/tarry stools, pink/dark urine, unusual/prolonged bleeding.      NOTES:  Do not share this medication with others.  Laboratory and/or medical tests (such as INR, complete blood count) must be performed periodically to monitor your progress or check for side effects. Consult your doctor for more details.      MISSED DOSE:  For the best possible benefit, do not miss any doses. If you do miss a dose and remember on the same day, take it as soon as you remember. If you remember on the next day, skip the missed dose and resume your usual dosing schedule. Do not double the dose to catch up because this could increase your risk for bleeding. Keep a record of missed doses to give to your doctor or pharmacist. Contact your doctor or pharmacist if you miss 2 or more doses in a row.      STORAGE:  Store at room temperature away from light and moisture. Do not store in the bathroom. Keep all medications away from children and pets. Do not flush medications down the toilet or pour them into a drain unless instructed to do so. Properly discard this product when it is  or no longer needed. Consult your pharmacist or local waste disposal company for more details about how to safely discard your product.      MEDICAL ALERT:  Your condition and medication can cause complications in a medical emergency. For information about enrolling in MedicAlert, call 1-357.736.8625 ([place]) or 1-608.529.1121 ([place]).      Information last revised 2010 Copyright(c) 2010 [employer]             Depression / Suicide Risk    As you are discharged from this [facility] facility, it is important to learn how to keep safe from harming yourself.    Recognize the warning signs:  · Abrupt changes in personality, positive or negative- including increase in energy   · Giving away possessions  · Change in eating patterns- significant weight changes-  positive or negative  · Change in sleeping patterns- unable to sleep or sleeping all the time   · Unwillingness or inability to communicate  · Depression  · Unusual sadness, discouragement and  loneliness  · Talk of wanting to die  · Neglect of personal appearance   · Rebelliousness- reckless behavior  · Withdrawal from people/activities they love  · Confusion- inability to concentrate     If you or a loved one observes any of these behaviors or has concerns about self-harm, here's what you can do:  · Talk about it- your feelings and reasons for harming yourself  · Remove any means that you might use to hurt yourself (examples: pills, rope, extension cords, firearm)  · Get professional help from the community (Mental Health, Substance Abuse, psychological counseling)  · Do not be alone:Call your Safe Contact- someone whom you trust who will be there for you.  · Call your local CRISIS HOTLINE 038-6623 or 144-607-5606  · Call your local Children's Mobile Crisis Response Team [place] (570) 605-9611 or www.[facility]  · Call the toll free National Suicide Prevention Hotlines   · National Suicide Prevention Lifeline 011-737-VWWU (0429)  · National Hope Line Network 800-SUICIDE (526-6026)

## 2018-10-31 NOTE — DISCHARGE PLANNING
Anticipated Discharge Disposition: Home    Action: LSW spoke with patient regarding transportation. LSW informed patient CM can help set up transport home via [employer]. LSW asked patient how he was going to get to his follow up appointments and patient reported his daughter can help coordinate a ride. LSW informed patient that CM can not set up rides post-discharge to appointments.     Barriers to Discharge: none    Plan: LSW to set up [employer] once cleared for d/c

## 2018-10-31 NOTE — NON-PROVIDER
Internal Medicine Medical Student Note  Note Author: Eugenio Valdes, Student    Name Joshua Medrano     1965   Age/Sex 52 y.o. male   MRN [id number]   Code Status Full       Reason for interval visit  (Principal Problem)   Acute on chronic respiratory failure (HCC)    Interval Problem Daily Status Update  (problem status, last 24 hours, new history, new data )   No acute overnight events. Patient feels subjectively better and expresses a desire to return home. When inquiring about his multiple missed outpatient appointments to both HF and discharge clinic, Mr. Medrano reports that he has difficulty with transportation if his daughter is busy at work. His O2 requirements remain at his baseline 2L. Serial CXRs remain stable.     Physical Exam     Vitals:    10/31/18 0108 10/31/18 0431 10/31/18 0456 10/31/18 0644   BP: 136/71  113/66    Pulse: 67 68 73 74   Resp: 18 20 18 16   Temp: 36.8 °C (98.3 °F)  37.2 °C (98.9 °F)    SpO2: 95% 95% 95% 95%   Weight:       Height:         Body mass index is 43.19 kg/m². Weight: (!) 165.2 kg (364 lb 3.2 oz)  Oxygen Therapy:  Pulse Oximetry: 95 %, O2 (LPM): 2, O2 Delivery: Silicone Nasal Cannula    Physical Exam   Constitutional: He is oriented to person, place, and time. He appears unhealthy. No distress.   HENT:   Head: Normocephalic and atraumatic.   Eyes: EOM are normal.   Neck: Normal range of motion.   Cardiovascular: Normal rate, regular rhythm and intact distal pulses.  Exam reveals no friction rub.    Murmur heard.   Systolic murmur is present with a grade of 2/6   Pulmonary/Chest: No tachypnea. He has wheezes. He has no rales. He exhibits tenderness.   Very fine wheezes at upper lobes bilaterally.    Abdominal: Soft. Bowel sounds are normal. He exhibits distension. There is no tenderness. There is no rebound and no guarding.   Neurological: He is alert and oriented to person, place, and time. GCS score is 15.   Skin: Skin is warm and dry.   Psychiatric: Mood and  affect normal.       Labs/Imaging     Lab Results   Component Value Date/Time    SODIUM 130 (L) 10/30/2018 07:46 AM    POTASSIUM 4.5 10/30/2018 07:46 AM    CHLORIDE 95 (L) 10/30/2018 07:46 AM    CO2 23 10/30/2018 07:46 AM    BUN 36 (H) 10/30/2018 07:46 AM    CREATININE 1.56 (H) 10/30/2018 07:46 AM    GLUCOSE 154 (H) 10/30/2018 07:46 AM     Lab Results   Component Value Date/Time    WBC 24.5 (H) 10/30/2018 07:46 AM    HEMOGLOBIN 7.4 (L) 10/30/2018 07:46 AM    HEMATOCRIT 27.6 (L) 10/30/2018 07:46 AM    PLATELETCT 261 10/30/2018 07:46 AM       Assessment/Plan   Mr. Medrano is a 52 year old male with acute on chronic respiratory failure with hypoxia. The differential includes, but is not limited to: COPD exacerbation, CHFpEF exacerbation, and obstructive sleep apnea. He also suffered right rib fractures 2/2 ground-level fall. He has a complex medical history including COPD, CHF, CAD s/p CABG, paroxysmal atrial fibrillation, morbid obesity, DM2, anemia, HTN, HLD, and medical noncompliance.        1. Acute on Chronic Respiratory Failure with Hypoxia  - Two days of SOB, cough with sick contact  - Hx of COPD, CHF, mild intermittent asthma with multiple admissions for exacerbations of such  - Fine, scattered wheezes remain, O2 requirement at baseline 2L  - CXR stable  - Patient ready for discharge home     Plan:   - Discontinue IV solumedrol, lasix, po doxycycline  - Discharge on    - Symbicort inhaler bid   - Albuterol inhaler 2 puff q4h prn   - Robitussin DM q6h   - Prednisone taper   - Lasix 40 PO bid for one week, then back to baseline 20 bid   - Cefdinir   - Baseline 2L O2  - Follow-up with PCP in one week and HF clinic     2. Rib Fractures  3. Pneumothorax - resolved  - [facility]  - XR showed acute fractures of the anterolateral aspects of the right fifth through eighth ribs and small right apical pneumothorax  - Pain improved; no morphine required since 10/29  - CXR today stable     Plan:    - Pain control:    - Lidocaine patch              - Tylenol 1g q8h   - Tramadol 100 q8h  - Encourage use of IS at home  - Follow-up with PCP    4. Leukocytosis  - Chronic in nature  - Acute increase likely due to steroids for COPD exacerbation; chronic nature likely secondary to stress  - Infection unlikely; patient afebrile and procalcitonin WNL  - Cefdinir as above  - Repeat CBC, CMP as in 1 week and follow-up with PCP     5. Anemia, microcytic  - Previous admissions for GI bleed from unclear origin - ?diverticular bleed  - Last admission this month diagnosed with iron-deficiency anemia  - Colonoscopy and EGD in July '18 revealed no source of the bleeding  - Continue Ferrous sulfate 325 daily  - Repeat CBC, CMP as in 1 week and follow-up with PCP        Chronic Issues:     6. Congestive Heart Failure with Preserved Ejection Fraction  - Last echo 10/10/18: LVEF 55%  - Lasix as above  - Continue home spironolactone 25 daily  - Follow up with PCP and HF clinic      7. Paroxysmal Atrial Fibrillation  - Anticoagulation was held a few months ago due to recent GI bleed  - On Heparin 5000u q8hg for DVT prophylaxis  - Baseline PT 17.3, INR 1.41  - Continue warfarin 7.5mg  - Continue home amiodarone 200mg daily and metoprolol sr 25mg daily  - Follow-up with anticoagulation clinic on Friday and PCP    8. CKD, stage III  - Lasix as above   - Repeat CBC, CMP as in 1 week and follow-up with PCP    11. Essential HTN  - Continue home metoprolol sr 25mg daily  - Follow-up with PCP     12. Hyperlipidemia  - Lipids: total 77, trigs 72, HDL 23, LDL 40  - Continue home rosuvastatin 40 daily  - Follow-up with PCP    12. DM2  - A1C 5.6 on 10/28/2018  - Follow-up with PCP

## 2018-10-31 NOTE — DISCHARGE PLANNING
Patient LACE Score = 78  - Previous admissions: SOBx2, GIB,GLF           Pt received D/C orders. Duoneb was only prescribed for 60 bullets. ROBERTA RN paged MD and got rx re-prescribed so pt has 180 bullets in a month. Pt has 10 refills on that medication and 15 refills for his rescue inhaler.   [facility] [address]. Unclear if this is supposed to be [facility] on [address]?   ROBERTA RN called [facility] to see if they are able to fill all prescriptions. [facility] at this time is [facility] working      Follow Up:  Call [facility] pharmacy in 1 hour to check if they are able to fill all medications.

## 2018-10-31 NOTE — DISCHARGE PLANNING
Medical Social Work    LSW provided uber to get pt to his residence at [address] [facility] at RN's request as it was requested by Unit LSW.

## 2018-10-31 NOTE — DISCHARGE PLANNING
Called [facility] pharmacy. They were able to fill all of the pt's prescriptions (minus over the counter medications) except his duoneb because the resident who wrote the prescription is not medicaid A/B eligible so insurance would not accept it. The pharmacy contacted his primary care and got a prescription for him, but only for 30 bullets. That would only give him 10 days of the medication. His follow up is in 14 days.    DC RN paged [facility] to see if another doctor who is medicaid eligible could re-write the prescription. Once DC RN sees the order, will call the pharmacy to see if they can swap out the prescriptions.

## 2018-10-31 NOTE — CARE PLAN
Problem: Oxygenation:  Goal: Maintain adequate oxygenation dependent on patient condition  Outcome: PROGRESSING AS EXPECTED  PT on 2 LNC at home baseline  SpO2 94%    Problem: Bronchoconstriction:  Goal: Improve in air movement and diminished wheezing  Outcome: PROGRESSING AS EXPECTED  Pt on DUO QID      Problem: Hyperinflation:  Goal: Prevent or improve atelectasis  Outcome: PROGRESSING AS EXPECTED  PEP QID  IS best value 1500 ml  60% 2190

## 2018-10-31 NOTE — PROGRESS NOTES
Bedside report received. No overnight events per night shift RN. Patient A&Ox 4. No complaints of pain. Will medicate per MAR. POC discussed with patient. Patient verbalizes understanding. Call light and belongings within reach. Bed locked and in lowest position, alarm and fall precautions in place.

## 2018-10-31 NOTE — PROGRESS NOTES
Patient okay to discharge. All lines and monitor discontinued. Discharge instructions and medications discussed with patient, all questions answered, patient verbalizes understanding. Follow up appointments scheduled. Pneumonia and influenza vaccines addressed. Discharge instructions, prescriptions, and personal belongings with patient. Patient down to car via wheelchair with transport, off unit without incident. Tele monitor signed in and returned to [facility]. HF packet given and key points addressed. MTM transport discussed with patient and detailed instructions on how to arrange transport given to take home. Importance of attending follow up appointments readdressed.

## 2018-10-31 NOTE — DISCHARGE PLANNING
Bedside MIN Saba paged SHIVAM Avila who said the appointment on 11/5 was okay if the coumadin dose was reduced to 5 mg daily.    DC RN paged [facility] to get a new prescription to be E-scribed to the [facility] pharmacy for 5mg coumadin daily and the 7.5mg to be discontinued.

## 2018-10-31 NOTE — PROGRESS NOTES
Inpatient Anticoagulation Service Note    Date: 10/31/2018  Reason for Anticoagulation: Atrial Fibrillation   [id number] VASc Score: 5    Hemoglobin Value: (!) 7.4  Hematocrit Value: (!) 27.6  Lab Platelet Value: 261  Target INR: 2.0 to 3.0    INR from last 7 days     Date/Time INR Value    10/30/18 0746 (!)  1.41    10/29/18 1645 (!)  1.39        Dose from last 7 days     Date/Time Dose (mg)    10/31/18 1400  7.5    10/30/18 1400  7.5    10/29/18 1600  7.5        Average Dose (mg):  Previously at [facility] (2018): 5mg M/W & 10mg AOD  Significant Interactions: amiodarone, corticosteroids, statin, spironolactone and Fe tabs  Bridge Therapy: No   Reversal Agent Administered: Not Applicable    Comments: No new INR since yesterday. Continue 7.5 mg po daily as pt was sub-therapeutic yesterday and full effect of dosing regimen is not expected to reflect in the INR until ~5 doses. DDIs with amiodarone, statin, spironolactone and Fe tab will become established interactions with warfarin dosing and antibiotics are no longer a DDI. Will monitor for needed warfarin adjustments with discontinuation and/or dose change of corticosteroids. H/H is low, but appears to be stable, no s/sx of bleeding are noted in the chart. Will CTM daily.    Plan:  Continue 7.5 mg po daily and check INR tomorrow.    Education Material Provided?: No (Recommend re-education prior to discharge)    Pharmacist suggested discharge dosin.5 mg po daily with close f/u within 3 days.     Annabelle Machuca, Pharmacy Intern    I concur with the above.  Justin Vo, PharmD

## 2018-10-31 NOTE — DISCHARGE PLANNING
Anticipated Discharge Disposition: D/C to Home/Self-Care    Action: LSW confirmed that pt has MTM transport benefits. Pt is able to use this benefit at d/c from medical facilities and also is able to use this service to get to medical appts and the pharmacy. Pt provided with MTM flier.     Barriers to Discharge: None.    Plan: No further d/c planning needs at this time.

## 2018-10-31 NOTE — THERAPY
"Occupational Therapy Evaluation completed.   Functional Status:  Pt sitting on EOB on arrival.  Pt able to dress LB & don shoes with set up & extra time.  Pt amb to bathroom with SBA on O2 2L N/C.  Pt able to get on/off toilet using grab bar, however pt reported increased pain in his RUE & ribs from fall.  Pt provided with Care [facility] phone # & advised to obtain a [facility] BSC for home use to increase the height over his toilet as pt is 6'5\".  Pt reports his wife & daughter will be able to assist if needed upon D/C.  Plan of Care: Patient with no further skilled OT needs in the acute care setting at this time  Discharge Recommendations:  Equipment: Bedside Commode([facility]). Post-acute therapy Discharge to home with outpatient or home health for additional skilled therapy services.    See \"Rehab Therapy-Acute\" Patient Summary Report for complete documentation.    "

## 2018-10-31 NOTE — DISCHARGE PLANNING
ROBERTA RN spoke with [facility] pharmacy and the duoneb is filled for 180 bullets and will be $8.12. Pt updated  The patient is to follow up with the coumadin clinic on Friday 11/2, however the earliest the schedulers could get the pt in was Friday 11/5. MD's paged to clarify if the appointment on 11/5 is okay.

## 2018-10-31 NOTE — CARE PLAN
Problem: Safety  Goal: Will remain free from injury  Outcome: PROGRESSING AS EXPECTED  Bed locked and in lowest position. Bed alarm on. Treaded socks on. Call light and belongings within reach. Patient educated to call for assistance. Patient verbalizes understanding. Hourly rounding in place.     Problem: Knowledge Deficit  Goal: Knowledge of disease process/condition, treatment plan, diagnostic tests, and medications will improve  Outcome: PROGRESSING AS EXPECTED  Discussed POC and medications with patient. Patient verbalized understanding. Stressed importance of medication compliance and follow up appointments. Pt acknowledged understanding.

## 2018-10-31 NOTE — PROGRESS NOTES
Received report from day RN assumed care at 1915. Pt A&Ox 4 . Pt states 0 /10 pain at this time. Plan of care discussed with Pt, verbalized understanding. no family present at bedside. Assessment completed. All Pt needs met at this time. Call light within reach, bed alarm off, calls appropriately , bed locked and in low position. Will continue to monitor.

## 2018-10-31 NOTE — PROGRESS NOTES
Monitor Summary  SR with rare PVC, rare couplet, 1st degree HB, BBB and 10 bundle branch beats  Rate 70-76  .24/.12/.40

## 2018-11-01 ENCOUNTER — PATIENT OUTREACH (OUTPATIENT)
Dept: HEALTH INFORMATION MANAGEMENT | Facility: OTHER | Age: 53
End: 2018-11-01

## 2018-11-01 NOTE — PROGRESS NOTES
Pt given MTM transport form and educated that he is able to use this to get free rides to and from doctors appointments, pharmacies, etc.

## 2018-11-01 NOTE — PROGRESS NOTES
11/01/2018 0848 - Discharge Outreach attempt -   11/01/2018 1522 - Received callback from wife - complaining that no pain meds were prescribed at discharge. Asking for something besides tylenol. Mentioned lidocaine patches. States PCP won't do anything until he is seen which is not until 11/14. Suggested urgent care. Wife states she has no transportation because she just spent all her money on his prescriptions. Transferred to floor.

## 2018-11-03 ENCOUNTER — APPOINTMENT (OUTPATIENT)
Dept: RADIOLOGY | Facility: MEDICAL CENTER | Age: 53
End: 2018-11-03
Attending: EMERGENCY MEDICINE
Payer: MEDICARE

## 2018-11-03 ENCOUNTER — HOSPITAL ENCOUNTER (EMERGENCY)
Facility: MEDICAL CENTER | Age: 53
End: 2018-11-03
Attending: EMERGENCY MEDICINE
Payer: MEDICARE

## 2018-11-03 VITALS
HEART RATE: 86 BPM | OXYGEN SATURATION: 99 % | DIASTOLIC BLOOD PRESSURE: 67 MMHG | WEIGHT: 315 LBS | TEMPERATURE: 98.4 F | BODY MASS INDEX: 37.19 KG/M2 | SYSTOLIC BLOOD PRESSURE: 109 MMHG | RESPIRATION RATE: 18 BRPM | HEIGHT: 77 IN

## 2018-11-03 DIAGNOSIS — R06.02 SOB (SHORTNESS OF BREATH): ICD-10-CM

## 2018-11-03 DIAGNOSIS — R79.89 ELEVATED TROPONIN: ICD-10-CM

## 2018-11-03 DIAGNOSIS — S22.41XA CLOSED FRACTURE OF MULTIPLE RIBS OF RIGHT SIDE, INITIAL ENCOUNTER: Primary | ICD-10-CM

## 2018-11-03 DIAGNOSIS — R07.89 CHEST WALL PAIN: ICD-10-CM

## 2018-11-03 LAB
ALBUMIN SERPL BCP-MCNC: 3.6 G/DL (ref 3.2–4.9)
ALBUMIN/GLOB SERPL: 0.9 G/DL
ALP SERPL-CCNC: 117 U/L (ref 30–99)
ALT SERPL-CCNC: 32 U/L (ref 2–50)
ANION GAP SERPL CALC-SCNC: 8 MMOL/L (ref 0–11.9)
ANISOCYTOSIS BLD QL SMEAR: ABNORMAL
APTT PPP: 38.1 SEC (ref 24.7–36)
AST SERPL-CCNC: 34 U/L (ref 12–45)
BASOPHILS # BLD AUTO: 0.9 % (ref 0–1.8)
BASOPHILS # BLD: 0.22 K/UL (ref 0–0.12)
BILIRUB SERPL-MCNC: 1.3 MG/DL (ref 0.1–1.5)
BNP SERPL-MCNC: 356 PG/ML (ref 0–100)
BUN SERPL-MCNC: 34 MG/DL (ref 8–22)
CALCIUM SERPL-MCNC: 9.5 MG/DL (ref 8.5–10.5)
CHLORIDE SERPL-SCNC: 98 MMOL/L (ref 96–112)
CO2 SERPL-SCNC: 31 MMOL/L (ref 20–33)
CREAT SERPL-MCNC: 1.25 MG/DL (ref 0.5–1.4)
DACRYOCYTES BLD QL SMEAR: NORMAL
EKG IMPRESSION: NORMAL
EOSINOPHIL # BLD AUTO: 0 K/UL (ref 0–0.51)
EOSINOPHIL NFR BLD: 0 % (ref 0–6.9)
ERYTHROCYTE [DISTWIDTH] IN BLOOD BY AUTOMATED COUNT: 69 FL (ref 35.9–50)
FLUAV RNA SPEC QL NAA+PROBE: NEGATIVE
FLUBV RNA SPEC QL NAA+PROBE: NEGATIVE
GLOBULIN SER CALC-MCNC: 3.9 G/DL (ref 1.9–3.5)
GLUCOSE SERPL-MCNC: 133 MG/DL (ref 65–99)
HCT VFR BLD AUTO: 34.9 % (ref 42–52)
HGB BLD-MCNC: 8.8 G/DL (ref 14–18)
HYPOCHROMIA BLD QL SMEAR: ABNORMAL
INR PPP: 1.81 (ref 0.87–1.13)
LACTATE BLD-SCNC: 1.3 MMOL/L (ref 0.5–2)
LG PLATELETS BLD QL SMEAR: NORMAL
LIPASE SERPL-CCNC: 30 U/L (ref 11–82)
LYMPHOCYTES # BLD AUTO: 1.7 K/UL (ref 1–4.8)
LYMPHOCYTES NFR BLD: 6.9 % (ref 22–41)
MANUAL DIFF BLD: NORMAL
MCH RBC QN AUTO: 17.9 PG (ref 27–33)
MCHC RBC AUTO-ENTMCNC: 25.2 G/DL (ref 33.7–35.3)
MCV RBC AUTO: 71.1 FL (ref 81.4–97.8)
MICROCYTES BLD QL SMEAR: ABNORMAL
MONOCYTES # BLD AUTO: 0.86 K/UL (ref 0–0.85)
MONOCYTES NFR BLD AUTO: 3.5 % (ref 0–13.4)
MORPHOLOGY BLD-IMP: NORMAL
MYELOCYTES NFR BLD MANUAL: 0.9 %
NEUTROPHILS # BLD AUTO: 21.69 K/UL (ref 1.82–7.42)
NEUTROPHILS NFR BLD: 87.8 % (ref 44–72)
NRBC # BLD AUTO: 0.08 K/UL
NRBC BLD-RTO: 0.3 /100 WBC
OVALOCYTES BLD QL SMEAR: NORMAL
PLATELET # BLD AUTO: 327 K/UL (ref 164–446)
PLATELET BLD QL SMEAR: NORMAL
PMV BLD AUTO: 9.7 FL (ref 9–12.9)
POIKILOCYTOSIS BLD QL SMEAR: NORMAL
POLYCHROMASIA BLD QL SMEAR: NORMAL
POTASSIUM SERPL-SCNC: 3.9 MMOL/L (ref 3.6–5.5)
PROT SERPL-MCNC: 7.5 G/DL (ref 6–8.2)
PROTHROMBIN TIME: 21.1 SEC (ref 12–14.6)
RBC # BLD AUTO: 4.91 M/UL (ref 4.7–6.1)
RBC BLD AUTO: PRESENT
SODIUM SERPL-SCNC: 137 MMOL/L (ref 135–145)
TROPONIN I SERPL-MCNC: 0.05 NG/ML (ref 0–0.04)
WBC # BLD AUTO: 24.7 K/UL (ref 4.8–10.8)

## 2018-11-03 PROCEDURE — A9270 NON-COVERED ITEM OR SERVICE: HCPCS | Performed by: EMERGENCY MEDICINE

## 2018-11-03 PROCEDURE — 87040 BLOOD CULTURE FOR BACTERIA: CPT

## 2018-11-03 PROCEDURE — 83605 ASSAY OF LACTIC ACID: CPT

## 2018-11-03 PROCEDURE — 36415 COLL VENOUS BLD VENIPUNCTURE: CPT

## 2018-11-03 PROCEDURE — 80053 COMPREHEN METABOLIC PANEL: CPT

## 2018-11-03 PROCEDURE — 700105 HCHG RX REV CODE 258: Performed by: EMERGENCY MEDICINE

## 2018-11-03 PROCEDURE — 71045 X-RAY EXAM CHEST 1 VIEW: CPT

## 2018-11-03 PROCEDURE — 83690 ASSAY OF LIPASE: CPT

## 2018-11-03 PROCEDURE — 700111 HCHG RX REV CODE 636 W/ 250 OVERRIDE (IP): Performed by: EMERGENCY MEDICINE

## 2018-11-03 PROCEDURE — 99285 EMERGENCY DEPT VISIT HI MDM: CPT

## 2018-11-03 PROCEDURE — 87502 INFLUENZA DNA AMP PROBE: CPT

## 2018-11-03 PROCEDURE — 93005 ELECTROCARDIOGRAM TRACING: CPT | Performed by: EMERGENCY MEDICINE

## 2018-11-03 PROCEDURE — 700102 HCHG RX REV CODE 250 W/ 637 OVERRIDE(OP): Performed by: EMERGENCY MEDICINE

## 2018-11-03 PROCEDURE — 96374 THER/PROPH/DIAG INJ IV PUSH: CPT

## 2018-11-03 PROCEDURE — 85007 BL SMEAR W/DIFF WBC COUNT: CPT

## 2018-11-03 PROCEDURE — 93005 ELECTROCARDIOGRAM TRACING: CPT

## 2018-11-03 PROCEDURE — 84484 ASSAY OF TROPONIN QUANT: CPT

## 2018-11-03 PROCEDURE — 85610 PROTHROMBIN TIME: CPT

## 2018-11-03 PROCEDURE — 85027 COMPLETE CBC AUTOMATED: CPT

## 2018-11-03 PROCEDURE — 85730 THROMBOPLASTIN TIME PARTIAL: CPT

## 2018-11-03 PROCEDURE — 83880 ASSAY OF NATRIURETIC PEPTIDE: CPT

## 2018-11-03 PROCEDURE — 304561 HCHG STAT O2

## 2018-11-03 RX ORDER — OXYCODONE HYDROCHLORIDE AND ACETAMINOPHEN 5; 325 MG/1; MG/1
1-2 TABLET ORAL EVERY 4 HOURS PRN
Qty: 30 TAB | Refills: 0 | Status: ON HOLD | OUTPATIENT
Start: 2018-11-03 | End: 2018-11-10

## 2018-11-03 RX ORDER — OXYCODONE HYDROCHLORIDE AND ACETAMINOPHEN 5; 325 MG/1; MG/1
1 TABLET ORAL ONCE
Status: COMPLETED | OUTPATIENT
Start: 2018-11-03 | End: 2018-11-03

## 2018-11-03 RX ORDER — ONDANSETRON 2 MG/ML
4 INJECTION INTRAMUSCULAR; INTRAVENOUS ONCE
Status: COMPLETED | OUTPATIENT
Start: 2018-11-03 | End: 2018-11-03

## 2018-11-03 RX ORDER — CIPROFLOXACIN 500 MG/1
500 TABLET, FILM COATED ORAL 2 TIMES DAILY
Qty: 20 TAB | Refills: 0 | Status: ON HOLD | OUTPATIENT
Start: 2018-11-03 | End: 2018-11-10

## 2018-11-03 RX ORDER — SODIUM CHLORIDE 9 MG/ML
1000 INJECTION, SOLUTION INTRAVENOUS ONCE
Status: COMPLETED | OUTPATIENT
Start: 2018-11-03 | End: 2018-11-03

## 2018-11-03 RX ADMIN — ONDANSETRON 4 MG: 2 INJECTION INTRAMUSCULAR; INTRAVENOUS at 19:23

## 2018-11-03 RX ADMIN — OXYCODONE AND ACETAMINOPHEN 1 TABLET: 5; 325 TABLET ORAL at 19:23

## 2018-11-03 RX ADMIN — SODIUM CHLORIDE 1000 ML: 9 INJECTION, SOLUTION INTRAVENOUS at 19:45

## 2018-11-03 ASSESSMENT — PAIN SCALES - GENERAL
PAINLEVEL_OUTOF10: 10
PAINLEVEL_OUTOF10: 10

## 2018-11-03 NOTE — ED TRIAGE NOTES
Pt to triage in .  Chief Complaint   Patient presents with   • Shortness of Breath   • Rib Pain   • Difficulty Sleeping     Pt states he fell from a [employer] gurney several days ago. Was seen here, did not receive any pain meds.  Pt also states he has been using his oxygen more.

## 2018-11-04 NOTE — ED NOTES
Pt back to room complaining of pain related to recent rib fxs. Pt stating pt he was recently admitted here and d/c'd approx 4 days ago. Pt still with SOB and pain

## 2018-11-04 NOTE — ED PROVIDER NOTES
ED Provider Note  CHIEF COMPLAINT  Chief Complaint   Patient presents with   • Shortness of Breath   • Rib Pain   • Difficulty Sleeping       HPI  Joshua Medrano is a 52 y.o. male who presents with increasing shortness of breath and difficulty sleeping over the past several days.  Is gotten worse over the past 24-48 hours.  He is coughing.  He arrives in severe respiratory distress.  He is markedly obese weighing well over 300 pounds.  He has a history of COPD and congestive heart failure..  He has some right lateral pleuritic chest pain which is related to a fall injuring his ribs.    REVIEW OF SYSTEMS  No headache, no jaw pain.  ALL OTHER SYSTEMS NEGATIVE    ALLERGIES  Penicillin, erythromycin.      PAST MEDICAL HISTORY  Past Medical History:   Diagnosis Date   • ASTHMA    • Atrial fibrillation (HCC)    • CAD (coronary artery disease)    • Chronic obstructive pulmonary disease (HCC)    • Congestive heart failure (HCC)    • Diabetes        SURGICAL HISTORY  Past Surgical History:   Procedure Laterality Date   • COLONOSCOPY - ENDO N/A 7/25/2018    Procedure: COLONOSCOPY - ENDO;  Surgeon: Josiha Molina D.O.;  Location: ENDOSCOPY [facility];  Service: Gastroenterology   • THORACOSCOPY Left 1/25/2018    Procedure: THORACOSCOPY- PLEURODESIS ;  Surgeon: Chandu Paez M.D.;  Location: SURGERY [facility];  Service: General   • MULTIPLE CORONARY ARTERY BYPASS ENDO VEIN HARVEST  12/22/2017    Procedure: MULTIPLE CORONARY ARTERY BYPASS ENDO VEIN HARVEST X2;  Surgeon: Kiel Ash M.D.;  Location: SURGERY [facility];  Service: Cardiothoracic   • JIM  12/22/2017    Procedure: JIM;  Surgeon: Kiel Ash M.D.;  Location: SURGERY [facility];  Service: Cardiothoracic   • OTHER ABDOMINAL SURGERY         FAMILY HISTORY  Family History   Problem Relation Age of Onset   • Diabetes Mother    • Stroke Mother    • Leukemia Mother    • Diabetes Father    • No Known Problems Sister    • Heart Disease Brother  "        PPM   • Lung Disease Brother    • Alcohol/Drug Brother    • Diabetes Sister        SOCIAL HISTORY  Social History     Social History   • Marital status:      Spouse name: N/A   • Number of children: N/A   • Years of education: N/A     Social History Main Topics   • Smoking status: Current Some Day Smoker     Packs/day: 0.00     Years: 30.00     Types: Cigarettes   • Smokeless tobacco: Never Used      Comment: 1/2-1.5 packs for 30 years   • Alcohol use No   • Drug use: No   • Sexual activity: Not on file     Other Topics Concern   • Not on file     Social History Narrative   • No narrative on file       PHYSICAL EXAM  GENERAL: Alert morbidly obese male adult in respiratory distress  VITAL SIGNS: /67   Pulse 89   Temp 36.9 °C (98.4 °F)   Resp (!) 25   Ht 1.956 m (6' 5\")   Wt (!) 165.1 kg (364 lb)   SpO2 91%   BMI 43.16 kg/m²    Constitutional: Alert morbidly obese adult HENT: Scalp is normal size and nontender. Ears are clear. Nose is clear. Throat is clear with no stridor no drooling no trismus. Teeth are all intact.  Eyes: Pupils equal round and reactive to light, extraocular motor fall. There is no scleral icterus.  Neck: Neck is supple and nontender. There is no meningismus. No adenitis. No thyromegaly.  Lymphatic: No adenopathy.   Cardiovascular: Heart regular rhythm without murmurs or gallops   Thorax & Lungs: Right anterior lateral ecchymosis and chest wall tenderness. Lungs are congested. Patient has good breath sounds bilateral.  Rhonchi and wheezes.  Abdomen: Abdomen is soft, nontender, not rigid, no guarding, and no organomegaly. There is no palpable hernia   Skin: Warm, pink, and dry with no erythema and no rash.   Back: Nontender, no midline bony tenderness, no flank tenderness.  Extremities: Full range of motion  No tenderness to palpation and no deformities noted..  No obvious tenderness.  Neurologic: Alert & oriented . Cranial nerves are grossly intact as tested. Patient " moves all 4 extremities well. Patient has good strong flexion and extension of the ankle joints knee joints hip joints and elbow joints. Sensation is normal and symmetrical in the upper and lower extremities.   Psychiatric: Patient is alert oriented coherent and rational.     RADIOLOGY/PROCEDURES  DX-CHEST-PORTABLE (1 VIEW)   Final Result         1.  Diffuse linear and poorly defined pulmonary opacifications are again noted bilaterally with some volume loss in the right lung. Findings could be due to pulmonary edema or inflammation.      2.  No new infiltrates or consolidations identified.      3.  Cardiomegaly again noted.        COURSE & MEDICAL DECISION MAKING  This is a morbidly obese male adult who arrives in moderate to severe respiratory distress.  Is a large ecchymotic lesion to his right lateral chest wall which was related to a recent fall.  He was evaluated here recently and transferred home.  He has scattered rales and rhonchi.  Differential diagnosis: Pneumonia, bronchitis, COPD, pulmonary edema, sepsis, etc.    Plan: #1 IV #2 cardiac monitor, pulse ox monitor, blood pressure monitor.  3.  Chest x-ray and EKG #4.  Lab evaluation including CBC, CMP, troponin, pro time, d-dimer, blood cultures, lactate level.    Hydration: Patient came in in severe respiratory distress with rales rhonchi and wheezes.  Clinically appeared possibly septic.  He was given bolus of normal saline.  He was in such respiratory distress and was not able to tolerate p.o. fluids.  He has stabilized after breathing treatment and fluids.    Laboratory and reexamination: Count is 24,000.  Hemoglobin is 8.  The patient is anemic.  He has an elevated white count.  BUN is 34.  The NP is elevated at 356 with some mild heart failure.  Lactate level is normal.  Influenza screen is negative..    Results for orders placed or performed during the hospital encounter of 11/03/18   CBC w/ Differential   Result Value Ref Range    WBC 24.7 (H) 4.8 -  10.8 K/uL    RBC 4.91 4.70 - 6.10 M/uL    Hemoglobin 8.8 (L) 14.0 - 18.0 g/dL    Hematocrit 34.9 (L) 42.0 - 52.0 %    MCV 71.1 (L) 81.4 - 97.8 fL    MCH 17.9 (L) 27.0 - 33.0 pg    MCHC 25.2 (L) 33.7 - 35.3 g/dL    RDW 69.0 (H) 35.9 - 50.0 fL    Platelet Count 327 164 - 446 K/uL    MPV 9.7 9.0 - 12.9 fL    Nucleated RBC 0.30 /100 WBC    NRBC (Absolute) 0.08 K/uL    Neutrophils-Polys 87.80 (H) 44.00 - 72.00 %    Lymphocytes 6.90 (L) 22.00 - 41.00 %    Monocytes 3.50 0.00 - 13.40 %    Eosinophils 0.00 0.00 - 6.90 %    Basophils 0.90 0.00 - 1.80 %    Neutrophils (Absolute) 21.69 (H) 1.82 - 7.42 K/uL    Lymphs (Absolute) 1.70 1.00 - 4.80 K/uL    Monos (Absolute) 0.86 (H) 0.00 - 0.85 K/uL    Eos (Absolute) 0.00 0.00 - 0.51 K/uL    Baso (Absolute) 0.22 (H) 0.00 - 0.12 K/uL    Hypochromia 1+     Anisocytosis 1+     Microcytosis 1+    Complete Metabolic Panel (CMP)   Result Value Ref Range    Sodium 137 135 - 145 mmol/L    Potassium 3.9 3.6 - 5.5 mmol/L    Chloride 98 96 - 112 mmol/L    Co2 31 20 - 33 mmol/L    Anion Gap 8.0 0.0 - 11.9    Glucose 133 (H) 65 - 99 mg/dL    Bun 34 (H) 8 - 22 mg/dL    Creatinine 1.25 0.50 - 1.40 mg/dL    Calcium 9.5 8.5 - 10.5 mg/dL    AST(SGOT) 34 12 - 45 U/L    ALT(SGPT) 32 2 - 50 U/L    Alkaline Phosphatase 117 (H) 30 - 99 U/L    Total Bilirubin 1.3 0.1 - 1.5 mg/dL    Albumin 3.6 3.2 - 4.9 g/dL    Total Protein 7.5 6.0 - 8.2 g/dL    Globulin 3.9 (H) 1.9 - 3.5 g/dL    A-G Ratio 0.9 g/dL   Btype Natriuretic Peptide   Result Value Ref Range    B Natriuretic Peptide 356 (H) 0 - 100 pg/mL   Troponin STAT   Result Value Ref Range    Troponin I 0.05 (H) 0.00 - 0.04 ng/mL   Lipase   Result Value Ref Range    Lipase 30 11 - 82 U/L   PT/INR   Result Value Ref Range    PT 21.1 (H) 12.0 - 14.6 sec    INR 1.81 (H) 0.87 - 1.13   Influenza By PCR, A/B   Result Value Ref Range    Influenza virus A RNA Negative Negative    Influenza virus B, PCR Negative Negative   APTT   Result Value Ref Range    APTT 38.1  (H) 24.7 - 36.0 sec   LACTIC ACID   Result Value Ref Range    Lactic Acid 1.3 0.5 - 2.0 mmol/L   DIFFERENTIAL MANUAL   Result Value Ref Range    Myelocytes 0.90 %    Manual Diff Status PERFORMED    PERIPHERAL SMEAR REVIEW   Result Value Ref Range    Peripheral Smear Review see below    PLATELET ESTIMATE   Result Value Ref Range    Plt Estimation Normal    MORPHOLOGY   Result Value Ref Range    RBC Morphology Present     Large Platelets 1+     Polychromia 1+     Poikilocytosis 1+     Ovalocytes 1+     Tear Drop Cells 1+    ESTIMATED GFR   Result Value Ref Range    GFR If African American >60 >60 mL/min/1.73 m 2    GFR If Non African American 60 >60 mL/min/1.73 m 2   EKG (NOW)   Result Value Ref Range    Report       [facility] Emergency Dept.    Test Date:  2018  Pt Name:    JARRETT WHITE                   Department: ER  MRN:        [id number]                      Room:  Gender:     Male                         Technician: [id number]  :        1965                   Requested By:ER TRIAGE PROTOCOL  Order #:    [id number]                    Reading MD:    Measurements  Intervals                                Axis  Rate:       89                           P:          41  RI:         176                          QRS:        -36  QRSD:       102                          T:          -16  QT:         387  QTc:        471    Interpretive Statements  SINUS RHYTHM  ATRIAL PREMATURE COMPLEX  LEFT AXIS DEVIATION  LATE PRECORDIAL R/S TRANSITION  NONSPECIFIC T ABNORMALITIES, LATERAL LEADS  Compared to ECG 10/27/2018 05:03:21  Atrial premature complex(es) now present  Left-axis deviation now present  T-wave abnormality now present  Sinus tachycardia no longer present  Ventricular  premature complex(es) no longer present  Left anterior fascicular block no longer present        At 9:15 PM I was called to the room to see the patient.  He has 3 family members at the bedside.  The patient is awake and  oriented and coherent and rational.  The patient absolutely refuses admission to the hospital.  He understands that he is having signs of sepsis and probable pneumonia.  He absolutely refuses admission to the hospital.  He understands the risks associated with leaving against advice.  He has signed the AMA form.  I have explained to the patient that if he becomes septic he will become severely ill and could sustain brain damage, pneumonia, optimal organ failure, and possibly death.  He and the family understand all of this and he still insists on being signed out AGAINST MEDICAL ADVICE.  He is been told he is welcome to return at any time.  His only request he is requesting pain medicine he said that the only reason he came in tonight.  I recommended he be rechecked tomorrow morning here.  FINAL IMPRESSION  1.  Respiratory distress  2.  Morbid obesity  3.  Rib fractures  4.  Patient has signed out AGAINST MEDICAL ADVICE.      Electronically signed by: Gary Gansert, 11/3/2018/ 9:30 PM

## 2018-11-06 ENCOUNTER — TELEPHONE (OUTPATIENT)
Dept: VASCULAR LAB | Facility: MEDICAL CENTER | Age: 53
End: 2018-11-06

## 2018-11-06 ENCOUNTER — APPOINTMENT (OUTPATIENT)
Dept: RADIOLOGY | Facility: MEDICAL CENTER | Age: 53
DRG: 199 | End: 2018-11-06
Attending: EMERGENCY MEDICINE
Payer: MEDICARE

## 2018-11-06 ENCOUNTER — HOSPITAL ENCOUNTER (INPATIENT)
Facility: MEDICAL CENTER | Age: 53
LOS: 3 days | DRG: 199 | End: 2018-11-10
Attending: EMERGENCY MEDICINE | Admitting: INTERNAL MEDICINE
Payer: MEDICARE

## 2018-11-06 DIAGNOSIS — R07.89 CHEST WALL PAIN: ICD-10-CM

## 2018-11-06 DIAGNOSIS — R06.02 SHORTNESS OF BREATH: ICD-10-CM

## 2018-11-06 DIAGNOSIS — J44.1 ACUTE EXACERBATION OF CHRONIC OBSTRUCTIVE PULMONARY DISEASE (COPD) (HCC): ICD-10-CM

## 2018-11-06 DIAGNOSIS — R79.89 ELEVATED TROPONIN: ICD-10-CM

## 2018-11-06 DIAGNOSIS — Z79.01 ON CONTINUOUS ORAL ANTICOAGULATION: ICD-10-CM

## 2018-11-06 DIAGNOSIS — I48.0 PAROXYSMAL ATRIAL FIBRILLATION (HCC): ICD-10-CM

## 2018-11-06 DIAGNOSIS — I50.9 ACUTE ON CHRONIC CONGESTIVE HEART FAILURE, UNSPECIFIED HEART FAILURE TYPE (HCC): ICD-10-CM

## 2018-11-06 DIAGNOSIS — J96.01 ACUTE RESPIRATORY FAILURE WITH HYPOXIA (HCC): ICD-10-CM

## 2018-11-06 DIAGNOSIS — D72.829 LEUKOCYTOSIS, UNSPECIFIED TYPE: ICD-10-CM

## 2018-11-06 DIAGNOSIS — Z86.79 HISTORY OF CORONARY ARTERY DISEASE: ICD-10-CM

## 2018-11-06 DIAGNOSIS — S22.41XD CLOSED FRACTURE OF MULTIPLE RIBS OF RIGHT SIDE WITH ROUTINE HEALING, SUBSEQUENT ENCOUNTER: ICD-10-CM

## 2018-11-06 DIAGNOSIS — J90 PLEURAL EFFUSION: ICD-10-CM

## 2018-11-06 DIAGNOSIS — J90 PLEURAL EFFUSION ON RIGHT: ICD-10-CM

## 2018-11-06 LAB
ALBUMIN SERPL BCP-MCNC: 3.3 G/DL (ref 3.2–4.9)
ALBUMIN/GLOB SERPL: 0.8 G/DL
ALP SERPL-CCNC: 118 U/L (ref 30–99)
ALT SERPL-CCNC: 23 U/L (ref 2–50)
ANION GAP SERPL CALC-SCNC: 6 MMOL/L (ref 0–11.9)
AST SERPL-CCNC: 25 U/L (ref 12–45)
BILIRUB SERPL-MCNC: 0.7 MG/DL (ref 0.1–1.5)
BUN SERPL-MCNC: 32 MG/DL (ref 8–22)
CALCIUM SERPL-MCNC: 9.2 MG/DL (ref 8.5–10.5)
CHLORIDE SERPL-SCNC: 99 MMOL/L (ref 96–112)
CO2 SERPL-SCNC: 29 MMOL/L (ref 20–33)
CREAT SERPL-MCNC: 1.28 MG/DL (ref 0.5–1.4)
GLOBULIN SER CALC-MCNC: 4 G/DL (ref 1.9–3.5)
GLUCOSE SERPL-MCNC: 153 MG/DL (ref 65–99)
LACTATE BLD-SCNC: 1.1 MMOL/L (ref 0.5–2)
POTASSIUM SERPL-SCNC: 4.7 MMOL/L (ref 3.6–5.5)
PROT SERPL-MCNC: 7.3 G/DL (ref 6–8.2)
SODIUM SERPL-SCNC: 134 MMOL/L (ref 135–145)
TROPONIN I SERPL-MCNC: 0.14 NG/ML (ref 0–0.04)

## 2018-11-06 PROCEDURE — 83880 ASSAY OF NATRIURETIC PEPTIDE: CPT

## 2018-11-06 PROCEDURE — 85007 BL SMEAR W/DIFF WBC COUNT: CPT

## 2018-11-06 PROCEDURE — 700111 HCHG RX REV CODE 636 W/ 250 OVERRIDE (IP): Performed by: EMERGENCY MEDICINE

## 2018-11-06 PROCEDURE — 71045 X-RAY EXAM CHEST 1 VIEW: CPT

## 2018-11-06 PROCEDURE — 73080 X-RAY EXAM OF ELBOW: CPT | Mod: RT

## 2018-11-06 PROCEDURE — 304561 HCHG STAT O2

## 2018-11-06 PROCEDURE — 83605 ASSAY OF LACTIC ACID: CPT

## 2018-11-06 PROCEDURE — 84484 ASSAY OF TROPONIN QUANT: CPT

## 2018-11-06 PROCEDURE — 94640 AIRWAY INHALATION TREATMENT: CPT

## 2018-11-06 PROCEDURE — 99291 CRITICAL CARE FIRST HOUR: CPT

## 2018-11-06 PROCEDURE — 85610 PROTHROMBIN TIME: CPT

## 2018-11-06 PROCEDURE — 96375 TX/PRO/DX INJ NEW DRUG ADDON: CPT

## 2018-11-06 PROCEDURE — 85027 COMPLETE CBC AUTOMATED: CPT

## 2018-11-06 PROCEDURE — 93005 ELECTROCARDIOGRAM TRACING: CPT | Performed by: EMERGENCY MEDICINE

## 2018-11-06 PROCEDURE — 87040 BLOOD CULTURE FOR BACTERIA: CPT

## 2018-11-06 PROCEDURE — 700101 HCHG RX REV CODE 250: Performed by: EMERGENCY MEDICINE

## 2018-11-06 PROCEDURE — 80053 COMPREHEN METABOLIC PANEL: CPT

## 2018-11-06 PROCEDURE — 85730 THROMBOPLASTIN TIME PARTIAL: CPT

## 2018-11-06 RX ORDER — IPRATROPIUM BROMIDE AND ALBUTEROL SULFATE 2.5; .5 MG/3ML; MG/3ML
3 SOLUTION RESPIRATORY (INHALATION) ONCE
Status: COMPLETED | OUTPATIENT
Start: 2018-11-06 | End: 2018-11-06

## 2018-11-06 RX ORDER — METHYLPREDNISOLONE SODIUM SUCCINATE 125 MG/2ML
125 INJECTION, POWDER, LYOPHILIZED, FOR SOLUTION INTRAMUSCULAR; INTRAVENOUS ONCE
Status: COMPLETED | OUTPATIENT
Start: 2018-11-06 | End: 2018-11-06

## 2018-11-06 RX ORDER — LEVOFLOXACIN 5 MG/ML
750 INJECTION, SOLUTION INTRAVENOUS ONCE
Status: DISCONTINUED | OUTPATIENT
Start: 2018-11-06 | End: 2018-11-06

## 2018-11-06 RX ORDER — IPRATROPIUM BROMIDE AND ALBUTEROL SULFATE 2.5; .5 MG/3ML; MG/3ML
3 SOLUTION RESPIRATORY (INHALATION) ONCE
Status: DISCONTINUED | OUTPATIENT
Start: 2018-11-06 | End: 2018-11-07

## 2018-11-06 RX ORDER — ASPIRIN 81 MG/1
324 TABLET, CHEWABLE ORAL ONCE
Status: DISCONTINUED | OUTPATIENT
Start: 2018-11-07 | End: 2018-11-07

## 2018-11-06 RX ORDER — SODIUM CHLORIDE 9 MG/ML
30 INJECTION, SOLUTION INTRAVENOUS ONCE
Status: DISCONTINUED | OUTPATIENT
Start: 2018-11-06 | End: 2018-11-07

## 2018-11-06 RX ADMIN — IPRATROPIUM BROMIDE AND ALBUTEROL SULFATE 3 ML: .5; 3 SOLUTION RESPIRATORY (INHALATION) at 23:05

## 2018-11-06 RX ADMIN — METHYLPREDNISOLONE SODIUM SUCCINATE 125 MG: 125 INJECTION, POWDER, FOR SOLUTION INTRAMUSCULAR; INTRAVENOUS at 23:15

## 2018-11-06 ASSESSMENT — PAIN SCALES - GENERAL: PAINLEVEL_OUTOF10: 10

## 2018-11-06 NOTE — TELEPHONE ENCOUNTER
Spoke with pts wife on the phone. She reports they are currently home bound d/t pt having not having a car or transportation. Will request HH from pts PCP for pt/INR monitoring.     Khalida Comer, Pharm D

## 2018-11-07 ENCOUNTER — APPOINTMENT (OUTPATIENT)
Dept: RADIOLOGY | Facility: MEDICAL CENTER | Age: 53
DRG: 199 | End: 2018-11-07
Attending: EMERGENCY MEDICINE
Payer: MEDICARE

## 2018-11-07 ENCOUNTER — HOME HEALTH ADMISSION (OUTPATIENT)
Dept: HOME HEALTH SERVICES | Facility: HOME HEALTHCARE | Age: 53
End: 2018-11-07
Payer: MEDICARE

## 2018-11-07 ENCOUNTER — APPOINTMENT (OUTPATIENT)
Dept: RADIOLOGY | Facility: MEDICAL CENTER | Age: 53
DRG: 199 | End: 2018-11-07
Attending: STUDENT IN AN ORGANIZED HEALTH CARE EDUCATION/TRAINING PROGRAM
Payer: MEDICARE

## 2018-11-07 PROBLEM — R79.89 ELEVATED TROPONIN: Status: ACTIVE | Noted: 2018-11-07

## 2018-11-07 LAB
ABO GROUP BLD: NORMAL
ACTION RANGE TRIGGERED IACRT: NO
ALBUMIN SERPL BCP-MCNC: 3.2 G/DL (ref 3.2–4.9)
ALBUMIN/GLOB SERPL: 0.7 G/DL
ALP SERPL-CCNC: 118 U/L (ref 30–99)
ALT SERPL-CCNC: 22 U/L (ref 2–50)
AMYLASE FLD-CCNC: 27 U/L
AMYLASE SERPL-CCNC: 27 U/L (ref 20–103)
ANION GAP SERPL CALC-SCNC: 9 MMOL/L (ref 0–11.9)
ANISOCYTOSIS BLD QL SMEAR: ABNORMAL
APPEARANCE FLD: NORMAL
APTT PPP: 57.9 SEC (ref 24.7–36)
AST SERPL-CCNC: 21 U/L (ref 12–45)
BASE EXCESS BLDA CALC-SCNC: 5 MMOL/L (ref -4–3)
BASOPHILS # BLD AUTO: 0 % (ref 0–1.8)
BASOPHILS # BLD AUTO: 0.2 % (ref 0–1.8)
BASOPHILS # BLD: 0 K/UL (ref 0–0.12)
BASOPHILS # BLD: 0.04 K/UL (ref 0–0.12)
BASOPHILS NFR FLD: 1 %
BILIRUB SERPL-MCNC: 0.9 MG/DL (ref 0.1–1.5)
BLD GP AB SCN SERPL QL: NORMAL
BNP SERPL-MCNC: 445 PG/ML (ref 0–100)
BODY FLD TYPE: NORMAL
BODY TEMPERATURE: ABNORMAL DEGREES
BUN SERPL-MCNC: 32 MG/DL (ref 8–22)
BURR CELLS BLD QL SMEAR: NORMAL
CALCIUM SERPL-MCNC: 9.4 MG/DL (ref 8.5–10.5)
CHLORIDE SERPL-SCNC: 100 MMOL/L (ref 96–112)
CO2 BLDA-SCNC: 31 MMOL/L (ref 20–33)
CO2 SERPL-SCNC: 26 MMOL/L (ref 20–33)
COLOR FLD: NORMAL
CREAT SERPL-MCNC: 1.41 MG/DL (ref 0.5–1.4)
DACRYOCYTES BLD QL SMEAR: NORMAL
EKG IMPRESSION: NORMAL
EKG IMPRESSION: NORMAL
EOSINOPHIL # BLD AUTO: 0.04 K/UL (ref 0–0.51)
EOSINOPHIL # BLD AUTO: 0.35 K/UL (ref 0–0.51)
EOSINOPHIL NFR BLD: 0.2 % (ref 0–6.9)
EOSINOPHIL NFR BLD: 1.7 % (ref 0–6.9)
EOSINOPHIL NFR FLD: 2 %
ERYTHROCYTE [DISTWIDTH] IN BLOOD BY AUTOMATED COUNT: 68.7 FL (ref 35.9–50)
ERYTHROCYTE [DISTWIDTH] IN BLOOD BY AUTOMATED COUNT: 69.1 FL (ref 35.9–50)
GLOBULIN SER CALC-MCNC: 4.3 G/DL (ref 1.9–3.5)
GLUCOSE BLD-MCNC: 168 MG/DL (ref 65–99)
GLUCOSE BLD-MCNC: 174 MG/DL (ref 65–99)
GLUCOSE BLD-MCNC: 177 MG/DL (ref 65–99)
GLUCOSE BLD-MCNC: 191 MG/DL (ref 65–99)
GLUCOSE FLD-MCNC: 181 MG/DL
GLUCOSE SERPL-MCNC: 176 MG/DL (ref 65–99)
GRAM STN SPEC: NORMAL
HCO3 BLDA-SCNC: 29.6 MMOL/L (ref 17–25)
HCT VFR BLD AUTO: 33.4 % (ref 42–52)
HCT VFR BLD AUTO: 34.6 % (ref 42–52)
HGB BLD-MCNC: 8.6 G/DL (ref 14–18)
HGB BLD-MCNC: 9.2 G/DL (ref 14–18)
HOROWITZ INDEX BLDA+IHG-RTO: 223 MM[HG]
HYPOCHROMIA BLD QL SMEAR: ABNORMAL
IMM GRANULOCYTES # BLD AUTO: 1.01 K/UL (ref 0–0.11)
IMM GRANULOCYTES NFR BLD AUTO: 4.6 % (ref 0–0.9)
INR PPP: 3.73 (ref 0.87–1.13)
INR PPP: 3.88 (ref 0.87–1.13)
INST. QUALIFIED PATIENT IIQPT: YES
LDH FLD L TO P-CCNC: 1024 U/L
LDH SERPL L TO P-CCNC: 354 U/L (ref 107–266)
LG PLATELETS BLD QL SMEAR: NORMAL
LYMPHOCYTES # BLD AUTO: 0.36 K/UL (ref 1–4.8)
LYMPHOCYTES # BLD AUTO: 1.24 K/UL (ref 1–4.8)
LYMPHOCYTES NFR BLD: 1.6 % (ref 22–41)
LYMPHOCYTES NFR BLD: 6.1 % (ref 22–41)
LYMPHOCYTES NFR FLD: 10 %
MAGNESIUM SERPL-MCNC: 2.1 MG/DL (ref 1.5–2.5)
MANUAL DIFF BLD: NORMAL
MCH RBC QN AUTO: 18.4 PG (ref 27–33)
MCH RBC QN AUTO: 18.8 PG (ref 27–33)
MCHC RBC AUTO-ENTMCNC: 25.7 G/DL (ref 33.7–35.3)
MCHC RBC AUTO-ENTMCNC: 26.6 G/DL (ref 33.7–35.3)
MCV RBC AUTO: 70.8 FL (ref 81.4–97.8)
MCV RBC AUTO: 71.5 FL (ref 81.4–97.8)
METAMYELOCYTES NFR BLD MANUAL: 0.9 %
MICROCYTES BLD QL SMEAR: ABNORMAL
MONOCYTES # BLD AUTO: 0.3 K/UL (ref 0–0.85)
MONOCYTES # BLD AUTO: 1.24 K/UL (ref 0–0.85)
MONOCYTES NFR BLD AUTO: 1.4 % (ref 0–13.4)
MONOCYTES NFR BLD AUTO: 6.1 % (ref 0–13.4)
MONONUC CELLS NFR FLD: 3 %
MORPHOLOGY BLD-IMP: NORMAL
MORPHOLOGY BLD-IMP: NORMAL
NEUTROPHILS # BLD AUTO: 17.3 K/UL (ref 1.82–7.42)
NEUTROPHILS # BLD AUTO: 20.27 K/UL (ref 1.82–7.42)
NEUTROPHILS NFR BLD: 85.2 % (ref 44–72)
NEUTROPHILS NFR BLD: 92 % (ref 44–72)
NEUTROPHILS NFR FLD: 84 %
NRBC # BLD AUTO: 0.04 K/UL
NRBC # BLD AUTO: 0.07 K/UL
NRBC BLD-RTO: 0.2 /100 WBC
NRBC BLD-RTO: 0.3 /100 WBC
O2/TOTAL GAS SETTING VFR VENT: 30 %
OVALOCYTES BLD QL SMEAR: NORMAL
PCO2 BLDA: 41.1 MMHG (ref 26–37)
PH BLDA: 7.46 [PH] (ref 7.4–7.5)
PH FLD: 8 [PH]
PLATELET # BLD AUTO: 290 K/UL (ref 164–446)
PLATELET # BLD AUTO: 322 K/UL (ref 164–446)
PLATELET BLD QL SMEAR: NORMAL
PMV BLD AUTO: 9.9 FL (ref 9–12.9)
PO2 BLDA: 67 MMHG (ref 64–87)
POIKILOCYTOSIS BLD QL SMEAR: NORMAL
POLYCHROMASIA BLD QL SMEAR: NORMAL
POTASSIUM SERPL-SCNC: 4.8 MMOL/L (ref 3.6–5.5)
PROCALCITONIN SERPL-MCNC: 0.22 NG/ML
PROT FLD-MCNC: 4.3 G/DL
PROT SERPL-MCNC: 7.5 G/DL (ref 6–8.2)
PROTHROMBIN TIME: 36.9 SEC (ref 12–14.6)
PROTHROMBIN TIME: 38 SEC (ref 12–14.6)
RBC # BLD AUTO: 4.67 M/UL (ref 4.7–6.1)
RBC # BLD AUTO: 4.89 M/UL (ref 4.7–6.1)
RBC # FLD: NORMAL CELLS/UL
RBC BLD AUTO: PRESENT
RH BLD: NORMAL
SAO2 % BLDA: 94 % (ref 93–99)
SCHISTOCYTES BLD QL SMEAR: NORMAL
SIGNIFICANT IND 70042: NORMAL
SITE SITE: NORMAL
SODIUM SERPL-SCNC: 135 MMOL/L (ref 135–145)
SOURCE SOURCE: NORMAL
SPECIMEN DRAWN FROM PATIENT: ABNORMAL
STOMATOCYTES BLD QL SMEAR: NORMAL
TARGETS BLD QL SMEAR: NORMAL
TROPONIN I SERPL-MCNC: 0.11 NG/ML (ref 0–0.04)
TROPONIN I SERPL-MCNC: 0.12 NG/ML (ref 0–0.04)
VANCOMYCIN SERPL-MCNC: 28.2 UG/ML
WBC # BLD AUTO: 20.3 K/UL (ref 4.8–10.8)
WBC # BLD AUTO: 22 K/UL (ref 4.8–10.8)
WBC # FLD: 1333 CELLS/UL

## 2018-11-07 PROCEDURE — 94640 AIRWAY INHALATION TREATMENT: CPT

## 2018-11-07 PROCEDURE — 96375 TX/PRO/DX INJ NEW DRUG ADDON: CPT

## 2018-11-07 PROCEDURE — 700111 HCHG RX REV CODE 636 W/ 250 OVERRIDE (IP): Performed by: EMERGENCY MEDICINE

## 2018-11-07 PROCEDURE — 0W9930Z DRAINAGE OF RIGHT PLEURAL CAVITY WITH DRAINAGE DEVICE, PERCUTANEOUS APPROACH: ICD-10-PCS | Performed by: SURGERY

## 2018-11-07 PROCEDURE — 89051 BODY FLUID CELL COUNT: CPT

## 2018-11-07 PROCEDURE — 700105 HCHG RX REV CODE 258

## 2018-11-07 PROCEDURE — 32555 ASPIRATE PLEURA W/ IMAGING: CPT | Performed by: INTERNAL MEDICINE

## 2018-11-07 PROCEDURE — 87040 BLOOD CULTURE FOR BACTERIA: CPT

## 2018-11-07 PROCEDURE — 82962 GLUCOSE BLOOD TEST: CPT | Mod: 91

## 2018-11-07 PROCEDURE — 0W9930Z DRAINAGE OF RIGHT PLEURAL CAVITY WITH DRAINAGE DEVICE, PERCUTANEOUS APPROACH: ICD-10-PCS | Performed by: INTERNAL MEDICINE

## 2018-11-07 PROCEDURE — 82945 GLUCOSE OTHER FLUID: CPT

## 2018-11-07 PROCEDURE — C9132 KCENTRA, PER I.U.: HCPCS | Mod: JG | Performed by: INTERNAL MEDICINE

## 2018-11-07 PROCEDURE — 86850 RBC ANTIBODY SCREEN: CPT

## 2018-11-07 PROCEDURE — 94669 MECHANICAL CHEST WALL OSCILL: CPT

## 2018-11-07 PROCEDURE — 700111 HCHG RX REV CODE 636 W/ 250 OVERRIDE (IP): Mod: JG | Performed by: INTERNAL MEDICINE

## 2018-11-07 PROCEDURE — 700105 HCHG RX REV CODE 258: Performed by: INTERNAL MEDICINE

## 2018-11-07 PROCEDURE — 71045 X-RAY EXAM CHEST 1 VIEW: CPT

## 2018-11-07 PROCEDURE — 700101 HCHG RX REV CODE 250: Performed by: INTERNAL MEDICINE

## 2018-11-07 PROCEDURE — 84484 ASSAY OF TROPONIN QUANT: CPT | Mod: 91

## 2018-11-07 PROCEDURE — 36600 WITHDRAWAL OF ARTERIAL BLOOD: CPT

## 2018-11-07 PROCEDURE — 86900 BLOOD TYPING SEROLOGIC ABO: CPT

## 2018-11-07 PROCEDURE — 87205 SMEAR GRAM STAIN: CPT

## 2018-11-07 PROCEDURE — 86901 BLOOD TYPING SEROLOGIC RH(D): CPT

## 2018-11-07 PROCEDURE — 85025 COMPLETE CBC W/AUTO DIFF WBC: CPT

## 2018-11-07 PROCEDURE — 700111 HCHG RX REV CODE 636 W/ 250 OVERRIDE (IP): Performed by: STUDENT IN AN ORGANIZED HEALTH CARE EDUCATION/TRAINING PROGRAM

## 2018-11-07 PROCEDURE — 83735 ASSAY OF MAGNESIUM: CPT

## 2018-11-07 PROCEDURE — 88305 TISSUE EXAM BY PATHOLOGIST: CPT

## 2018-11-07 PROCEDURE — 87070 CULTURE OTHR SPECIMN AEROBIC: CPT

## 2018-11-07 PROCEDURE — 80053 COMPREHEN METABOLIC PANEL: CPT

## 2018-11-07 PROCEDURE — 85610 PROTHROMBIN TIME: CPT

## 2018-11-07 PROCEDURE — 99292 CRITICAL CARE ADDL 30 MIN: CPT | Mod: 25 | Performed by: INTERNAL MEDICINE

## 2018-11-07 PROCEDURE — 32554 ASPIRATE PLEURA W/O IMAGING: CPT

## 2018-11-07 PROCEDURE — 770022 HCHG ROOM/CARE - ICU (200)

## 2018-11-07 PROCEDURE — 700102 HCHG RX REV CODE 250 W/ 637 OVERRIDE(OP): Performed by: STUDENT IN AN ORGANIZED HEALTH CARE EDUCATION/TRAINING PROGRAM

## 2018-11-07 PROCEDURE — 80202 ASSAY OF VANCOMYCIN: CPT

## 2018-11-07 PROCEDURE — 88112 CYTOPATH CELL ENHANCE TECH: CPT

## 2018-11-07 PROCEDURE — 700105 HCHG RX REV CODE 258: Performed by: EMERGENCY MEDICINE

## 2018-11-07 PROCEDURE — 93005 ELECTROCARDIOGRAM TRACING: CPT | Performed by: STUDENT IN AN ORGANIZED HEALTH CARE EDUCATION/TRAINING PROGRAM

## 2018-11-07 PROCEDURE — 82150 ASSAY OF AMYLASE: CPT

## 2018-11-07 PROCEDURE — A9270 NON-COVERED ITEM OR SERVICE: HCPCS | Performed by: STUDENT IN AN ORGANIZED HEALTH CARE EDUCATION/TRAINING PROGRAM

## 2018-11-07 PROCEDURE — 93010 ELECTROCARDIOGRAM REPORT: CPT | Performed by: INTERNAL MEDICINE

## 2018-11-07 PROCEDURE — 96366 THER/PROPH/DIAG IV INF ADDON: CPT

## 2018-11-07 PROCEDURE — 71250 CT THORAX DX C-: CPT

## 2018-11-07 PROCEDURE — 84157 ASSAY OF PROTEIN OTHER: CPT

## 2018-11-07 PROCEDURE — 99291 CRITICAL CARE FIRST HOUR: CPT | Performed by: INTERNAL MEDICINE

## 2018-11-07 PROCEDURE — 94667 MNPJ CHEST WALL 1ST: CPT

## 2018-11-07 PROCEDURE — 83615 LACTATE (LD) (LDH) ENZYME: CPT

## 2018-11-07 PROCEDURE — 83986 ASSAY PH BODY FLUID NOS: CPT

## 2018-11-07 PROCEDURE — 82803 BLOOD GASES ANY COMBINATION: CPT

## 2018-11-07 PROCEDURE — 700101 HCHG RX REV CODE 250: Performed by: STUDENT IN AN ORGANIZED HEALTH CARE EDUCATION/TRAINING PROGRAM

## 2018-11-07 PROCEDURE — 96365 THER/PROPH/DIAG IV INF INIT: CPT

## 2018-11-07 PROCEDURE — 700105 HCHG RX REV CODE 258: Performed by: STUDENT IN AN ORGANIZED HEALTH CARE EDUCATION/TRAINING PROGRAM

## 2018-11-07 PROCEDURE — 5A09457 ASSISTANCE WITH RESPIRATORY VENTILATION, 24-96 CONSECUTIVE HOURS, CONTINUOUS POSITIVE AIRWAY PRESSURE: ICD-10-PCS | Performed by: INTERNAL MEDICINE

## 2018-11-07 PROCEDURE — 94002 VENT MGMT INPAT INIT DAY: CPT

## 2018-11-07 PROCEDURE — 84145 PROCALCITONIN (PCT): CPT

## 2018-11-07 PROCEDURE — 32551 INSERTION OF CHEST TUBE: CPT

## 2018-11-07 RX ORDER — NOREPINEPHRINE BITARTRATE 1 MG/ML
INJECTION, SOLUTION INTRAVENOUS
Status: ACTIVE
Start: 2018-11-07 | End: 2018-11-07

## 2018-11-07 RX ORDER — GUAIFENESIN 600 MG/1
600 TABLET, EXTENDED RELEASE ORAL EVERY 12 HOURS
Status: DISCONTINUED | OUTPATIENT
Start: 2018-11-07 | End: 2018-11-10 | Stop reason: HOSPADM

## 2018-11-07 RX ORDER — IPRATROPIUM BROMIDE AND ALBUTEROL SULFATE 2.5; .5 MG/3ML; MG/3ML
3 SOLUTION RESPIRATORY (INHALATION)
Status: DISCONTINUED | OUTPATIENT
Start: 2018-11-07 | End: 2018-11-07

## 2018-11-07 RX ORDER — SODIUM CHLORIDE 9 MG/ML
INJECTION, SOLUTION INTRAVENOUS
Status: COMPLETED
Start: 2018-11-07 | End: 2018-11-07

## 2018-11-07 RX ORDER — POLYETHYLENE GLYCOL 3350 17 G/17G
1 POWDER, FOR SOLUTION ORAL
Status: DISCONTINUED | OUTPATIENT
Start: 2018-11-07 | End: 2018-11-10 | Stop reason: HOSPADM

## 2018-11-07 RX ORDER — DOXYCYCLINE 100 MG/1
100 TABLET ORAL EVERY 12 HOURS
Status: DISCONTINUED | OUTPATIENT
Start: 2018-11-07 | End: 2018-11-07

## 2018-11-07 RX ORDER — LISINOPRIL 5 MG/1
2.5 TABLET ORAL DAILY
Status: DISCONTINUED | OUTPATIENT
Start: 2018-11-07 | End: 2018-11-10 | Stop reason: HOSPADM

## 2018-11-07 RX ORDER — SPIRONOLACTONE 25 MG/1
25 TABLET ORAL DAILY
Status: DISCONTINUED | OUTPATIENT
Start: 2018-11-07 | End: 2018-11-10 | Stop reason: HOSPADM

## 2018-11-07 RX ORDER — ASCORBIC ACID 500 MG
500 TABLET ORAL DAILY
Status: DISCONTINUED | OUTPATIENT
Start: 2018-11-07 | End: 2018-11-10 | Stop reason: HOSPADM

## 2018-11-07 RX ORDER — BUDESONIDE AND FORMOTEROL FUMARATE DIHYDRATE 160; 4.5 UG/1; UG/1
2 AEROSOL RESPIRATORY (INHALATION) 2 TIMES DAILY
Status: DISCONTINUED | OUTPATIENT
Start: 2018-11-07 | End: 2018-11-10 | Stop reason: HOSPADM

## 2018-11-07 RX ORDER — SODIUM CHLORIDE, SODIUM LACTATE, POTASSIUM CHLORIDE, CALCIUM CHLORIDE 600; 310; 30; 20 MG/100ML; MG/100ML; MG/100ML; MG/100ML
INJECTION, SOLUTION INTRAVENOUS CONTINUOUS
Status: DISCONTINUED | OUTPATIENT
Start: 2018-11-07 | End: 2018-11-07

## 2018-11-07 RX ORDER — ROSUVASTATIN CALCIUM 20 MG/1
40 TABLET, COATED ORAL DAILY
Status: DISCONTINUED | OUTPATIENT
Start: 2018-11-07 | End: 2018-11-10 | Stop reason: HOSPADM

## 2018-11-07 RX ORDER — TIOTROPIUM BROMIDE 18 UG/1
1 CAPSULE ORAL; RESPIRATORY (INHALATION) DAILY
Status: DISCONTINUED | OUTPATIENT
Start: 2018-11-07 | End: 2018-11-07

## 2018-11-07 RX ORDER — FUROSEMIDE 10 MG/ML
20 INJECTION INTRAMUSCULAR; INTRAVENOUS
Status: DISCONTINUED | OUTPATIENT
Start: 2018-11-07 | End: 2018-11-07

## 2018-11-07 RX ORDER — MORPHINE SULFATE 4 MG/ML
2 INJECTION, SOLUTION INTRAMUSCULAR; INTRAVENOUS ONCE
Status: COMPLETED | OUTPATIENT
Start: 2018-11-07 | End: 2018-11-07

## 2018-11-07 RX ORDER — IPRATROPIUM BROMIDE AND ALBUTEROL SULFATE 2.5; .5 MG/3ML; MG/3ML
3 SOLUTION RESPIRATORY (INHALATION)
Status: DISCONTINUED | OUTPATIENT
Start: 2018-11-07 | End: 2018-11-08

## 2018-11-07 RX ORDER — BISACODYL 10 MG
10 SUPPOSITORY, RECTAL RECTAL
Status: DISCONTINUED | OUTPATIENT
Start: 2018-11-07 | End: 2018-11-10 | Stop reason: HOSPADM

## 2018-11-07 RX ORDER — AMOXICILLIN 250 MG
2 CAPSULE ORAL 2 TIMES DAILY
Status: DISCONTINUED | OUTPATIENT
Start: 2018-11-07 | End: 2018-11-10 | Stop reason: HOSPADM

## 2018-11-07 RX ORDER — FERROUS SULFATE 325(65) MG
325 TABLET ORAL
Status: DISCONTINUED | OUTPATIENT
Start: 2018-11-07 | End: 2018-11-10 | Stop reason: HOSPADM

## 2018-11-07 RX ORDER — METHYLPREDNISOLONE SODIUM SUCCINATE 40 MG/ML
40 INJECTION, POWDER, LYOPHILIZED, FOR SOLUTION INTRAMUSCULAR; INTRAVENOUS EVERY 6 HOURS
Status: DISCONTINUED | OUTPATIENT
Start: 2018-11-07 | End: 2018-11-07

## 2018-11-07 RX ORDER — CALCIUM CARBONATE 500 MG/1
500 TABLET, CHEWABLE ORAL 2 TIMES DAILY PRN
Status: DISCONTINUED | OUTPATIENT
Start: 2018-11-07 | End: 2018-11-10 | Stop reason: HOSPADM

## 2018-11-07 RX ORDER — SODIUM CHLORIDE 9 MG/ML
INJECTION, SOLUTION INTRAVENOUS
Status: ACTIVE
Start: 2018-11-07 | End: 2018-11-07

## 2018-11-07 RX ORDER — OXYCODONE HYDROCHLORIDE AND ACETAMINOPHEN 5; 325 MG/1; MG/1
1 TABLET ORAL EVERY 6 HOURS PRN
Status: DISCONTINUED | OUTPATIENT
Start: 2018-11-07 | End: 2018-11-10 | Stop reason: HOSPADM

## 2018-11-07 RX ORDER — MORPHINE SULFATE 4 MG/ML
INJECTION, SOLUTION INTRAMUSCULAR; INTRAVENOUS
Status: ACTIVE
Start: 2018-11-07 | End: 2018-11-07

## 2018-11-07 RX ORDER — PREDNISONE 20 MG/1
20 TABLET ORAL DAILY
Status: DISCONTINUED | OUTPATIENT
Start: 2018-11-07 | End: 2018-11-07

## 2018-11-07 RX ORDER — DEXTROSE MONOHYDRATE 25 G/50ML
25 INJECTION, SOLUTION INTRAVENOUS
Status: DISCONTINUED | OUTPATIENT
Start: 2018-11-07 | End: 2018-11-08

## 2018-11-07 RX ORDER — ALBUTEROL SULFATE 90 UG/1
2 AEROSOL, METERED RESPIRATORY (INHALATION) EVERY 6 HOURS PRN
Status: DISCONTINUED | OUTPATIENT
Start: 2018-11-07 | End: 2018-11-10 | Stop reason: HOSPADM

## 2018-11-07 RX ORDER — PREDNISONE 20 MG/1
40 TABLET ORAL DAILY
Status: DISCONTINUED | OUTPATIENT
Start: 2018-11-08 | End: 2018-11-10 | Stop reason: HOSPADM

## 2018-11-07 RX ORDER — AMIODARONE HYDROCHLORIDE 200 MG/1
200 TABLET ORAL DAILY
Status: DISCONTINUED | OUTPATIENT
Start: 2018-11-07 | End: 2018-11-10 | Stop reason: HOSPADM

## 2018-11-07 RX ORDER — IPRATROPIUM BROMIDE AND ALBUTEROL SULFATE 2.5; .5 MG/3ML; MG/3ML
3 SOLUTION RESPIRATORY (INHALATION)
Status: DISCONTINUED | OUTPATIENT
Start: 2018-11-07 | End: 2018-11-10 | Stop reason: HOSPADM

## 2018-11-07 RX ORDER — METOPROLOL SUCCINATE 25 MG/1
25 TABLET, EXTENDED RELEASE ORAL DAILY
Status: DISCONTINUED | OUTPATIENT
Start: 2018-11-07 | End: 2018-11-10 | Stop reason: HOSPADM

## 2018-11-07 RX ADMIN — INSULIN LISPRO 1 UNITS: 100 INJECTION, SOLUTION INTRAVENOUS; SUBCUTANEOUS at 12:12

## 2018-11-07 RX ADMIN — STANDARDIZED SENNA CONCENTRATE AND DOCUSATE SODIUM 2 TABLET: 8.6; 5 TABLET, FILM COATED ORAL at 17:43

## 2018-11-07 RX ADMIN — METHYLPREDNISOLONE SODIUM SUCCINATE 40 MG: 40 INJECTION, POWDER, FOR SOLUTION INTRAMUSCULAR; INTRAVENOUS at 05:00

## 2018-11-07 RX ADMIN — CEFEPIME 2 G: 2 INJECTION, POWDER, FOR SOLUTION INTRAVENOUS at 12:17

## 2018-11-07 RX ADMIN — CEFEPIME 2 G: 2 INJECTION, POWDER, FOR SOLUTION INTRAVENOUS at 19:48

## 2018-11-07 RX ADMIN — IPRATROPIUM BROMIDE AND ALBUTEROL SULFATE 3 ML: .5; 3 SOLUTION RESPIRATORY (INHALATION) at 18:44

## 2018-11-07 RX ADMIN — SODIUM CHLORIDE: 9 INJECTION, SOLUTION INTRAVENOUS at 11:00

## 2018-11-07 RX ADMIN — INSULIN LISPRO 1 UNITS: 100 INJECTION, SOLUTION INTRAVENOUS; SUBCUTANEOUS at 05:40

## 2018-11-07 RX ADMIN — IPRATROPIUM BROMIDE AND ALBUTEROL SULFATE 3 ML: .5; 3 SOLUTION RESPIRATORY (INHALATION) at 03:17

## 2018-11-07 RX ADMIN — MORPHINE SULFATE 2 MG: 4 INJECTION INTRAVENOUS at 11:06

## 2018-11-07 RX ADMIN — INSULIN LISPRO 1 UNITS: 100 INJECTION, SOLUTION INTRAVENOUS; SUBCUTANEOUS at 17:54

## 2018-11-07 RX ADMIN — BUDESONIDE AND FORMOTEROL FUMARATE DIHYDRATE 2 PUFF: 160; 4.5 AEROSOL RESPIRATORY (INHALATION) at 17:43

## 2018-11-07 RX ADMIN — IPRATROPIUM BROMIDE AND ALBUTEROL SULFATE 3 ML: .5; 3 SOLUTION RESPIRATORY (INHALATION) at 22:24

## 2018-11-07 RX ADMIN — OXYCODONE AND ACETAMINOPHEN 1 TABLET: 5; 325 TABLET ORAL at 20:16

## 2018-11-07 RX ADMIN — VANCOMYCIN HYDROCHLORIDE 3000 MG: 100 INJECTION, POWDER, LYOPHILIZED, FOR SOLUTION INTRAVENOUS at 00:55

## 2018-11-07 RX ADMIN — FUROSEMIDE 20 MG: 10 INJECTION, SOLUTION INTRAMUSCULAR; INTRAVENOUS at 03:13

## 2018-11-07 RX ADMIN — CEFEPIME 2 G: 2 INJECTION, POWDER, FOR SOLUTION INTRAVENOUS at 03:47

## 2018-11-07 RX ADMIN — PROTHROMBIN, COAGULATION FACTOR VII HUMAN, COAGULATION FACTOR IX HUMAN, COAGULATION FACTOR X HUMAN, PROTEIN C, PROTEIN S HUMAN, AND WATER 2654 UNITS: KIT at 08:18

## 2018-11-07 RX ADMIN — FENTANYL CITRATE 100 MCG: 50 INJECTION, SOLUTION INTRAMUSCULAR; INTRAVENOUS at 00:30

## 2018-11-07 RX ADMIN — IPRATROPIUM BROMIDE AND ALBUTEROL SULFATE 3 ML: .5; 3 SOLUTION RESPIRATORY (INHALATION) at 14:30

## 2018-11-07 RX ADMIN — GUAIFENESIN 600 MG: 600 TABLET, EXTENDED RELEASE ORAL at 17:43

## 2018-11-07 RX ADMIN — PHYTONADIONE 10 MG: 10 INJECTION, EMULSION INTRAMUSCULAR; INTRAVENOUS; SUBCUTANEOUS at 08:52

## 2018-11-07 RX ADMIN — IPRATROPIUM BROMIDE AND ALBUTEROL SULFATE 3 ML: .5; 3 SOLUTION RESPIRATORY (INHALATION) at 10:51

## 2018-11-07 RX ADMIN — SODIUM CHLORIDE: 9 INJECTION, SOLUTION INTRAVENOUS at 08:15

## 2018-11-07 RX ADMIN — IPRATROPIUM BROMIDE AND ALBUTEROL SULFATE 3 ML: .5; 3 SOLUTION RESPIRATORY (INHALATION) at 08:23

## 2018-11-07 ASSESSMENT — ENCOUNTER SYMPTOMS
EYES NEGATIVE: 1
EYE PAIN: 0
DIZZINESS: 0
TREMORS: 0
FALLS: 1
DIARRHEA: 0
SINUS PAIN: 0
PSYCHIATRIC NEGATIVE: 1
WEAKNESS: 0
DOUBLE VISION: 0
DIAPHORESIS: 0
AGITATION: 0
BRUISES/BLEEDS EASILY: 0
CONSTIPATION: 0
SORE THROAT: 0
BRUISES/BLEEDS EASILY: 1
STRIDOR: 0
NUMBNESS: 0
FEVER: 0
EYE DISCHARGE: 0
GASTROINTESTINAL NEGATIVE: 1
DEPRESSION: 0
VOMITING: 0
ORTHOPNEA: 1
NAUSEA: 0
COUGH: 1
HEMOPTYSIS: 0
SHORTNESS OF BREATH: 1
MUSCULOSKELETAL NEGATIVE: 1
BACK PAIN: 1
DIAPHORESIS: 1
WEAKNESS: 1
FLANK PAIN: 0
NERVOUS/ANXIOUS: 0
SPUTUM PRODUCTION: 1
CARDIOVASCULAR NEGATIVE: 1
WEIGHT LOSS: 0
HEADACHES: 0
CHILLS: 0
FEVER: 1
SPEECH CHANGE: 0
FOCAL WEAKNESS: 0
SENSORY CHANGE: 0
BLURRED VISION: 0
HEARTBURN: 0
ABDOMINAL PAIN: 0
PALPITATIONS: 0

## 2018-11-07 ASSESSMENT — PULMONARY FUNCTION TESTS
EPAP_CMH2O: 10
EPAP_CMH2O: 8
EPAP_CMH2O: 10

## 2018-11-07 ASSESSMENT — PAIN SCALES - GENERAL
PAINLEVEL_OUTOF10: ASSUMED PAIN PRESENT
PAINLEVEL_OUTOF10: 4
PAINLEVEL_OUTOF10: 9
PAINLEVEL_OUTOF10: 4
PAINLEVEL_OUTOF10: 2
PAINLEVEL_OUTOF10: 7
PAINLEVEL_OUTOF10: 2
PAINLEVEL_OUTOF10: 6
PAINLEVEL_OUTOF10: 4

## 2018-11-07 ASSESSMENT — LIFESTYLE VARIABLES
SUBSTANCE_ABUSE: 0
EVER_SMOKED: YES

## 2018-11-07 ASSESSMENT — COPD QUESTIONNAIRES
HAVE YOU SMOKED AT LEAST 100 CIGARETTES IN YOUR ENTIRE LIFE: YES
COPD SCREENING SCORE: 9
DO YOU EVER COUGH UP ANY MUCUS OR PHLEGM?: YES, EVERY DAY
DURING THE PAST 4 WEEKS HOW MUCH DID YOU FEEL SHORT OF BREATH: MOST  OR ALL OF THE TIME

## 2018-11-07 NOTE — CARE PLAN
Problem: Safety  Goal: Will remain free from injury  Outcome: PROGRESSING AS EXPECTED  Patient has remained free from injury at this time

## 2018-11-07 NOTE — PROGRESS NOTES
"Disregard....  Note moved / copied to \"procedure note\"    Thoracentesis  Date:  11/07/18  Time:  09:15 AM  Indication: Large Right pleural effusion  Resident:  Obey Oconnor M.D.  Attending:  Cesar Peace M.D.  A time-out was completed verifying correct patient, procedure, site, positioning, and special equipment if applicable. The patient’s   Right  side was prepped and draped in a sterile manner after the appropriate infiltration level was confirmed by ultrasound. 1% lidocaine was used anesthetize the surrounding skin. A finder needle was then used to locate fluid and clear yellow fluid was obtained. A 10-blade scalpel used to make the incision. The thoracentesis catheter was then threaded without difficulty. The patient had 10 ml of bloody/red serosanginous fluid removed.   Dr. Peace  was present for the entire procedure.  The fluid will be sent for several studies.  Estimated Blood Loss: 10 ml.  (difficulty getting more output, so stopped after getting diagnostic sample).   The patient tolerated the procedure well and there were no complications.  Since there was a low volume output, surgery (Dr. Zavaleta) was consulted for a chest tube; and arrived for chest tube placement, prior to follow-up CXR.  The subsequent CXR noted improved pleural effusion, and no pneumothorax.   Obey Oconnor M.D. , 11/7/2018   Attending:  Cesar Peace M.D.      "

## 2018-11-07 NOTE — ED NOTES
Pt tolerating BiPAP well and respiratory effort improved O2 sats increased and pt is able to rest at this time.

## 2018-11-07 NOTE — H&P
Internal Medicine Admitting History and Physical    Note Author: Andrzej Larsen M.D.       Name Joshua Medrano     1965   Age/Sex 52 y.o. male   MRN [id number]   Code Status Full Code     After 5PM or if no immediate response to page, please call for cross-coverage  Attending/Team: Dr. Eller/Maninder See Patient List for primary contact information  Call [phone] to page        Chief Complaint:   Shortness of breath     HPI:  History was a little difficult to obtain since patient was on BiPAP at the time of examination.     Mr. Medrano is a 52-year-old male with a past medical history of COPD on 2 L of home oxygen, heart failure with preserved ejection fraction 55%, SHASHANK, paroxysmal A. fib, type 2 diabetes mellitus, coronary artery disease, CABG, and dyslipidemia; the patient presented to the emergency department with the above-stated complaints.  Patient describes that his symptoms began today when he laid down to rest, he has been noticing shortness of breath becoming worse for the past 2 days.  Patient does state that his grandson was sick and his wife later became sick, patient could not describe what their symptoms were and just said they were sick.  However, patient relates that he has not been feeling very well for the past 3 days and he thinks he might have caught what his grandson and wife had.  Patient goes on to describe that he has been noticing a productive cough of green sputum and he has noticed that he has been using more pillows to sleep, he normally uses 3 pillows to prop himself up but lately he has been using 4.  He also notices that his walking distance is also decreased due to his shortness of breath.  Patient does state that he has been taking his medication as prescribed.     Patient was recently discharged on 2018 with acute on chronic respiratory failure secondary to COPD exacerbation.  During the hospitalization patient underwent a fall from the EMS gurney  and suffered rib fractures from 5 through 8 resulting in an apical pneumothorax.  No surgical interventions were indicated that that time.  Patient states that the pain is improved since then, but he still has a swollen right elbow, injured right digits, and some minor pain in his right costal region.     Patient relates having productive cough, shortness of breath particularly on exertion, wheezing, some blood in the stool in the past but not current, and joint pain secondary to his fall he had recently.     Patient denied fever, chills, sore throat, chest pain, nausea, vomiting, abdominal pain, diarrhea, constipation, he denies any recent blood in his stool, dysuria, nor any lightheadedness.     Patient does have a 30-pack-year smoking history but quit on his last hospitalization.  Patient reports not smoking, not consuming any alcohol, nor any illicit substances.     Review of Systems   Constitutional: Positive for diaphoresis. Negative for chills, fever and malaise/fatigue.   HENT: Negative.    Eyes: Negative.    Respiratory: Positive for shortness of breath.    Cardiovascular: Positive for orthopnea, leg swelling and PND. Negative for chest pain.   Gastrointestinal: Negative.    Genitourinary: Negative.    Musculoskeletal: Positive for joint pain.   Skin: Positive for rash.   Neurological: Negative.    Endo/Heme/Allergies: Negative.    Psychiatric/Behavioral: Negative.      ROS          Past Medical History (Chronic medical problem, known complications and current treatment)    Past Medical History:   Diagnosis Date   • ASTHMA    • Atrial fibrillation (HCC)    • CAD (coronary artery disease)    • Chronic obstructive pulmonary disease (HCC)    • Congestive heart failure (HCC)    • Diabetes        Past Surgical History:  Past Surgical History:   Procedure Laterality Date   • COLONOSCOPY - ENDO N/A 7/25/2018    Procedure: COLONOSCOPY - ENDO;  Surgeon: Josiah Molina D.O.;  Location: ENDOSCOPY [facility];   Service: Gastroenterology   • THORACOSCOPY Left 1/25/2018    Procedure: THORACOSCOPY- PLEURODESIS ;  Surgeon: Chandu Paez M.D.;  Location: SURGERY [facility];  Service: General   • MULTIPLE CORONARY ARTERY BYPASS ENDO VEIN HARVEST  12/22/2017    Procedure: MULTIPLE CORONARY ARTERY BYPASS ENDO VEIN HARVEST X2;  Surgeon: Kiel Ash M.D.;  Location: SURGERY [facility];  Service: Cardiothoracic   • JIM  12/22/2017    Procedure: JIM;  Surgeon: Kiel Ash M.D.;  Location: SURGERY [facility];  Service: Cardiothoracic   • OTHER ABDOMINAL SURGERY         Current Outpatient Medications:  Home Medications    **Home medications have not yet been reviewed for this encounter**         Medication Allergy/Sensitivities:  Allergies   Allergen Reactions   • Cillins [Penicillins] Anaphylaxis and Rash     As a child, tolerated cefepime (12/2017), ceftriaxone (1/2018), cefazolin (12/2017)   • Erythromycin Anaphylaxis     Rxn - 1994   • Metoprolol      Pt stated got latham and aggressive, in demi dose's per wife.      • Shellfish Allergy Rash     Pt stated that he is allergic to all seafood, will develop a rash and says that rash will clear up with benadryl         Family History (mandatory)   Family History   Problem Relation Age of Onset   • Diabetes Mother    • Stroke Mother    • Leukemia Mother    • Diabetes Father    • No Known Problems Sister    • Heart Disease Brother         PPM   • Lung Disease Brother    • Alcohol/Drug Brother    • Diabetes Sister        Social History (mandatory)   Social History     Social History   • Marital status:      Spouse name: N/A   • Number of children: N/A   • Years of education: N/A     Occupational History   • Not on file.     Social History Main Topics   • Smoking status: Current Some Day Smoker     Packs/day: 0.00     Years: 30.00     Types: Cigarettes   • Smokeless tobacco: Never Used      Comment: 1/2-1.5 packs for 30 years   • Alcohol use No   • Drug use:  "No   • Sexual activity: Not on file     Other Topics Concern   • Not on file     Social History Narrative   • No narrative on file     PCP : Nam Le M.D.    Physical Exam     Vitals:    11/07/18 0030 11/07/18 0049 11/07/18 0100 11/07/18 0251   BP:       Pulse: 77      Resp: 19  (!) 24    Temp:       SpO2: 99% 98%  95%   Weight:       Height:         Body mass index is 41.85 kg/m².  /68   Pulse 77   Temp 37 °C (98.6 °F)   Resp (!) 24   Ht 1.956 m (6' 5\")   Wt (!) 160.1 kg (352 lb 15.3 oz)   SpO2 95%   BMI 41.85 kg/m²   O2 therapy: Pulse Oximetry: 95 %, O2 (LPM): 3, FiO2%: 32 %, O2 Delivery: Silicone Nasal Cannula    Physical Exam  Physical Exam   Constitutional: He is oriented to person, place, and time. He appears unhealthy. He appears distressed.   HENT:   Head: Normocephalic and atraumatic.   Eyes: Pupils are equal, round, and reactive to light. EOM are normal.   Neck: Normal range of motion. Neck supple.   Cardiovascular: Normal rate and normal heart sounds.    No murmur heard.  Pulmonary/Chest: Tachypnea noted. He has decreased breath sounds in the right middle field and the right lower field. He has wheezes.   Abdominal: Bowel sounds are normal.   Patient is obese   Genitourinary: Penis normal.   Musculoskeletal: He exhibits edema. He exhibits no tenderness.   Patient has stasis dermatitis   Neurological: He is alert and oriented to person, place, and time. No cranial nerve deficit. GCS score is 15.   Skin: Rash noted. There is erythema.   Psychiatric: Mood, memory, affect and judgment normal.     Data Review       Old Records Request:   Completed  Current Records review/summary: Completed    Lab Data Review:  Recent Results (from the past 24 hour(s))   Lactic acid (lactate)    Collection Time: 11/06/18 10:57 PM   Result Value Ref Range    Lactic Acid 1.1 0.5 - 2.0 mmol/L   CBC WITH DIFFERENTIAL    Collection Time: 11/06/18 10:57 PM   Result Value Ref Range    WBC 20.3 (H) 4.8 - 10.8 K/uL    " RBC 4.67 (L) 4.70 - 6.10 M/uL    Hemoglobin 8.6 (L) 14.0 - 18.0 g/dL    Hematocrit 33.4 (L) 42.0 - 52.0 %    MCV 71.5 (L) 81.4 - 97.8 fL    MCH 18.4 (L) 27.0 - 33.0 pg    MCHC 25.7 (L) 33.7 - 35.3 g/dL    RDW 69.1 (H) 35.9 - 50.0 fL    Platelet Count 290 164 - 446 K/uL    Neutrophils-Polys 85.20 (H) 44.00 - 72.00 %    Lymphocytes 6.10 (L) 22.00 - 41.00 %    Monocytes 6.10 0.00 - 13.40 %    Eosinophils 1.70 0.00 - 6.90 %    Basophils 0.00 0.00 - 1.80 %    Nucleated RBC 0.30 /100 WBC    Neutrophils (Absolute) 17.30 (H) 1.82 - 7.42 K/uL    Lymphs (Absolute) 1.24 1.00 - 4.80 K/uL    Monos (Absolute) 1.24 (H) 0.00 - 0.85 K/uL    Eos (Absolute) 0.35 0.00 - 0.51 K/uL    Baso (Absolute) 0.00 0.00 - 0.12 K/uL    NRBC (Absolute) 0.07 K/uL    Hypochromia 2+     Anisocytosis 2+     Microcytosis 2+    COMP METABOLIC PANEL    Collection Time: 11/06/18 10:57 PM   Result Value Ref Range    Sodium 134 (L) 135 - 145 mmol/L    Potassium 4.7 3.6 - 5.5 mmol/L    Chloride 99 96 - 112 mmol/L    Co2 29 20 - 33 mmol/L    Anion Gap 6.0 0.0 - 11.9    Glucose 153 (H) 65 - 99 mg/dL    Bun 32 (H) 8 - 22 mg/dL    Creatinine 1.28 0.50 - 1.40 mg/dL    Calcium 9.2 8.5 - 10.5 mg/dL    AST(SGOT) 25 12 - 45 U/L    ALT(SGPT) 23 2 - 50 U/L    Alkaline Phosphatase 118 (H) 30 - 99 U/L    Total Bilirubin 0.7 0.1 - 1.5 mg/dL    Albumin 3.3 3.2 - 4.9 g/dL    Total Protein 7.3 6.0 - 8.2 g/dL    Globulin 4.0 (H) 1.9 - 3.5 g/dL    A-G Ratio 0.8 g/dL   BNP    Collection Time: 11/06/18 10:57 PM   Result Value Ref Range    B Natriuretic Peptide 445 (H) 0 - 100 pg/mL   TROPONIN    Collection Time: 11/06/18 10:57 PM   Result Value Ref Range    Troponin I 0.14 (H) 0.00 - 0.04 ng/mL   COD (ADULT)    Collection Time: 11/06/18 10:57 PM   Result Value Ref Range    Antibody Screen-Cod NEG     ABO Grouping Only A     Rh Grouping Only POS    PT/INR    Collection Time: 11/06/18 10:57 PM   Result Value Ref Range    PT 38.0 (H) 12.0 - 14.6 sec    INR 3.88 (H) 0.87 - 1.13   PTT      Collection Time: 18 10:57 PM   Result Value Ref Range    APTT 57.9 (H) 24.7 - 36.0 sec   ESTIMATED GFR    Collection Time: 18 10:57 PM   Result Value Ref Range    GFR If African American >60 >60 mL/min/1.73 m 2    GFR If Non African American 58 (A) >60 mL/min/1.73 m 2   DIFFERENTIAL MANUAL    Collection Time: 18 10:57 PM   Result Value Ref Range    Metamyelocytes 0.90 %    Manual Diff Status PERFORMED    PERIPHERAL SMEAR REVIEW    Collection Time: 18 10:57 PM   Result Value Ref Range    Peripheral Smear Review see below    PLATELET ESTIMATE    Collection Time: 18 10:57 PM   Result Value Ref Range    Plt Estimation Normal    MORPHOLOGY    Collection Time: 18 10:57 PM   Result Value Ref Range    RBC Morphology Present     Large Platelets 1+     Polychromia 2+     Poikilocytosis 2+     Ovalocytes 2+     Schistocytes 1+     Target Cells 1+     Tear Drop Cells 2+     Echinocytes 1+     Stomatocytes 2+    EKG    Collection Time: 18 11:08 PM   Result Value Ref Range    Report       [facility] Emergency Dept.    Test Date:  2018  Pt Name:    JARRETT WHITE                   Department: ER  MRN:        [id number]                      Room:       [facility]  Gender:     Male                         Technician: [id number]  :        1965                   Requested By:RED RAY  Order #:    [id number]                    Reading MD: Red Ray MD    Measurements  Intervals                                Axis  Rate:       81                           P:          0  MO:         163                          QRS:        -47  QRSD:       108                          T:          128  QT:         416  QTc:        483    Interpretive Statements  SINUS RHYTHM  LEFT ANTERIOR FASCICULAR BLOCK  LATE PRECORDIAL R/S TRANSITION  NONSPECIFIC T ABNORMALITIES, LATERAL LEADS  BORDERLINE PROLONGED QT INTERVAL  Compared to ECG 2018 16:04:47  Left anterior fascicular  block now present  Atrial premature complex(es) no longer present  Left-axis deviation n o longer present  T-wave abnormality still present    Electronically Signed On 11-7-2018 0:20:25 PST by Red Sevilla MD     ISTAT ARTERIAL BLOOD GAS    Collection Time: 11/07/18  2:44 AM   Result Value Ref Range    Ph 7.465 7.400 - 7.500    Pco2 41.1 (H) 26.0 - 37.0 mmHg    Po2 67 64 - 87 mmHg    Tco2 31 20 - 33 mmol/L    S02 94 93 - 99 %    Hco3 29.6 (H) 17.0 - 25.0 mmol/L    BE 5 (H) -4 - 3 mmol/L    Body Temp see below degrees    O2 Therapy 30 %    iPF Ratio 223     Specimen Arterial     Action Range Triggered NO     Inst. Qualified Patient YES        Imaging/Procedures Review:    Independant Imaging Review: Completed  DX-ELBOW-COMPLETE 3+ RIGHT   Final Result      1.  No evidence of acute fracture or dislocation.      2.  Soft tissue swelling.      3.  Mild degenerative change.      4.  Joint effusion.      DX-CHEST-PORTABLE (1 VIEW)   Final Result      1.  Cardiomegaly with pulmonary edema.      2.  Large right pleural effusion with right lung base atelectasis.      CT-CTA CHEST PULMONARY ARTERY W/ RECONS    (Results Pending)             Assessment/Plan     * Acute on chronic respiratory failure (HCC)- (present on admission)   Assessment & Plan    52-year-old male with a past medical history of COPD, heart failure with preserved ejection fraction, paroxysmal A. fib, type 2 diabetes mellitus, CAD, status post CABG, and dyslipidemia, with recent hospitalizations for exacerbation of COPD and heart failure.  Patient mentions having recent sick contacts and feeling progressively short of breath for the past 2-3 days, he has also noticed a decrease in the distant that he can walk to having to rest.  Patient states that he has been taking his medication as prescribed.  Chest x-ray reveals a large right pleural effusion, cardiomegaly, and pulmonary edema.  BNP is slightly elevated compared to BNP on discharge from October 31.   On physical exam patient is dyspneic on BiPAP.  Patient has wheezing and reduced breath sounds on the right middle and lower lobes.  Patient is obese.  Based on the aforementioned information, it appears the patient might be having an acute exacerbation of his congestive heart failure and his COPD.  Patient currently being treated for concern of pneumonia, patient received a dose of cefepime and vancomycin based on previous admissions.  Patient is SIRS 2 out of 4 for his white blood cells and tachypnea, however patient was recently on steroids.  Plan  -Patient to be continued on BiPAP, patient states that his breathing has improved with BiPAP.  - Patient to be diuresed, Lasix switched to 20 mg IV twice daily.  Daily weights and strict ins and outs  - Patient will be continued on his ACE inhibitor, beta-blocker, Aldactone, and Crestor.  -RT protocol implemented, scheduled DuoNeb treatments  - Patient started on IV Solu-Medrol, for a short course to be transitioned to oral medication  - Blood cultures and urine cultures ordered.  - Patient received vancomycin and cefepime for possible concern of pneumonia, ceftriaxone and doxycycline continued on floor.  Day team to consider whether to continue antibiotic treatment.     Heart failure with preserved ejection fraction, borderline, class IV (HCC)- (present on admission)   Assessment & Plan    Please refer to acute on chronic respiratory failure assessment and plan     Chronic obstructive pulmonary disease with acute exacerbation (HCC)- (present on admission)   Assessment & Plan    Please refer to acute on chronic respiratory failure assessment and plan     Microcytic anemia- (present on admission)   Assessment & Plan    Patient has a microcytic anemia, MCV is 71.5.  This is due to iron deficiency, red cell distribution width is elevated and last iron panel consistent with iron deficiency.  Patient has been on ferrous sulfate as outpatient and has showed some slight  improvement in his hemoglobin.  Plan  -Once infection has been ruled out day team to consider IV infusion of iron  - Patient's home dose of ferrous sulfate continued, vitamin C added for possible absorption issues       Acute exacerbation of CHF (congestive heart failure) (HCC)- (present on admission)   Assessment & Plan    Please refer to acute on chronic respiratory failure assessment and plan     Elevated troponin   Assessment & Plan    Patient has not troponin level of 0.14.  Patient had a history of slightly elevated troponin levels in the past.  This could be due to his CHF.  EKG did not reveal any ST segment changes and patient is not complaining of any chest pain or chest discomfort.  Plan  -Trend troponins     Paroxysmal atrial fibrillation (HCC)- (present on admission)   Assessment & Plan    Patient on warfarin as outpatient basis.  Plan  - Pharmacy to dose  -Fecal occult stool sample ordered     Leukocytosis- (present on admission)   Assessment & Plan    Patient has a leukocytosis, this appears to be a chronic issue dating back several months.  Plan  -Flow cytometry         Anticipated Hospital stay:  >2 midnights        Quality Measures  Quality-Core Measures   Reviewed items::  Labs reviewed and Medications reviewed  Ceballos catheter::  No Ceballos  DVT prophylaxis pharmacological::  Warfarin (Coumadin)  Antibiotics:  Treating active infection/contamination beyond 24 hours perioperative coverage    PCP: Nam Le M.D.

## 2018-11-07 NOTE — ASSESSMENT & PLAN NOTE
On warfarin 5 mg daily at home, the patient's warfarin was held during this admission given his recent fall causing R rib fractures with lung injury and sanguinous chest tube drainage.  Chest tube removed 11/9/18 without complications.  Patient to re-start warfarin 7 days from today on Saturday, 11/17/18.

## 2018-11-07 NOTE — ASSESSMENT & PLAN NOTE
Patient had a history of slightly elevated troponin levels in the past. This could be due to his CHF.  EKG did not reveal any ST segment changes and patient is not complaining of any chest pain or chest discomfort.

## 2018-11-07 NOTE — ASSESSMENT & PLAN NOTE
In sinus rhythm by EKG on admit with controlled heart rate  Continue amiodarone  Optimize electrolytes  Additional rate control with calcium channel blockers or beta blockers as needed, would prefer calcium channel blockers with bronchospasm at this time  INR a bit high and admission at 3.73, will hold warfarin today and follow serial coagulation studies  May need to reverse anticoagulation in part to perform safe thoracentesis    Critically ill with multi-organ dysfunction. Pt/family appreciate that pt is fragile with high risk of mortality on this hospital admission

## 2018-11-07 NOTE — PROGRESS NOTES
Dr. Lyons and Dr Larsen at bedside at this time to assess patient. New orders received to insert jenkins cath

## 2018-11-07 NOTE — ASSESSMENT & PLAN NOTE
Suspect demand ischemia, serial troponin I pending  Echocardiogram  Cardiac monitoring  Cardiology consultation if clinically appropriate, monitoring

## 2018-11-07 NOTE — CARE PLAN
Problem: Pain Management  Goal: Pain level will decrease to patient's comfort goal  Outcome: PROGRESSING AS EXPECTED  Denies pain when asked, shows no signs and symptoms of pain

## 2018-11-07 NOTE — CONSULTS
Surgery consult  11/7/2018    Attending Physician: Jin Zavaleta MD.     CC: Symptomatic pleural effusion    HPI: Mr Joshua Medrano is a very  is a 52 y.o. Male admit last night acute on chronic respiratory failure. He has difficulty communicating due to shortness of breath  Family, wife, providers's and medical record assisted obtain history  Per report discharged 31 oct after [facility]acute on chronic respiratory failure  Reports increased difficulty breathing   Report no fever, chills, sore throat, chest pain, nausea, vomiting, abdominal pain, diarrhea, constipation, he denies any recent blood in his stool, dysuria, nor any lightheadedness  He is receiving care pulmonary requested urgent placement chest tube          Past Medical History:   Diagnosis Date   • ASTHMA    • Atrial fibrillation (HCC)    • CAD (coronary artery disease)    • Chronic obstructive pulmonary disease (HCC)    • Congestive heart failure (HCC)    • Diabetes        Past Surgical History:   Procedure Laterality Date   • COLONOSCOPY - ENDO N/A 7/25/2018    Procedure: COLONOSCOPY - ENDO;  Surgeon: Josiah Molina D.O.;  Location: ENDOSCOPY [facility];  Service: Gastroenterology   • THORACOSCOPY Left 1/25/2018    Procedure: THORACOSCOPY- PLEURODESIS ;  Surgeon: Chandu Paez M.D.;  Location: SURGERY [facility];  Service: General   • MULTIPLE CORONARY ARTERY BYPASS ENDO VEIN HARVEST  12/22/2017    Procedure: MULTIPLE CORONARY ARTERY BYPASS ENDO VEIN HARVEST X2;  Surgeon: Kiel Ash M.D.;  Location: SURGERY [facility];  Service: Cardiothoracic   • JIM  12/22/2017    Procedure: JIM;  Surgeon: Kiel Ash M.D.;  Location: SURGERY [facility];  Service: Cardiothoracic   • OTHER ABDOMINAL SURGERY         Current Facility-Administered Medications   Medication Dose Route Frequency Provider Last Rate Last Dose   • Respiratory Care per Protocol   Nebulization Continuous RT Andrzej Larsen M.D.       • senna-docusate (PERICOLACE or  SENOKOT S) 8.6-50 MG per tablet 2 Tab  2 Tab Oral BID Andrzej Larsen M.D.        And   • polyethylene glycol/lytes (MIRALAX) PACKET 1 Packet  1 Packet Oral QDAY PRN Andrzej Larsen M.D.        And   • magnesium hydroxide (MILK OF MAGNESIA) suspension 30 mL  30 mL Oral QDAY PRN Andrzej Larsen M.D.        And   • bisacodyl (DULCOLAX) suppository 10 mg  10 mg Rectal QDAY PRN Andrzej Larsen M.D.       • insulin lispro (HUMALOG) injection 1-6 Units  1-6 Units Subcutaneous Q6HRS Andrzej Larsen M.D.   1 Units at 11/07/18 0540    And   • glucose 4 g chewable tablet 16 g  16 g Oral Q15 MIN PRN Andrzej Larsen M.D.        And   • dextrose 50% (D50W) injection 25 mL  25 mL Intravenous Q15 MIN PRN Andrzej Larsen M.D.       • ipratropium-albuterol (DUONEB) nebulizer solution  3 mL Nebulization Q4HRS (RT) Andrzej Larsen M.D.   3 mL at 11/07/18 1051   • albuterol inhaler 2 Puff  2 Puff Inhalation Q6HRS PRN Andrzej Larsen M.D.       • amiodarone (CORDARONE) tablet 200 mg  200 mg Oral DAILY Andrzej Larsen M.D.       • budesonide-formoterol (SYMBICORT) 160-4.5 MCG/ACT inhaler 2 Puff  2 Puff Inhalation BID Andrzej Larsen M.D.   Stopped at 11/07/18 0600   • calcium carbonate (TUMS) chewable tab 500 mg  500 mg Oral BID PRN Andrzej Larsen M.D.       • ferrous sulfate tablet 325 mg  325 mg Oral QDAY with Breakfast Andrzej Larsen M.D.       • guaiFENesin LA (MUCINEX) tablet 600 mg  600 mg Oral Q12HRS Andrzej Larsen M.D.       • lisinopril (PRINIVIL) tablet 2.5 mg  2.5 mg Oral DAILY Andrzej Larsen M.D.       • metoprolol SR (TOPROL XL) tablet 25 mg  25 mg Oral DAILY Andrzej Larsen M.D.       • oxyCODONE-acetaminophen (PERCOCET) 5-325 MG per tablet 1 Tab  1 Tab Oral Q6HRS PRN Andrzej Larsen M.D.       • rosuvastatin (CRESTOR) tablet 40 mg  40 mg Oral DAILY Andrzej Larsen M.D.       • spironolactone (ALDACTONE) tablet 25 mg  25 mg Oral DAILY Andrzej Larsen M.D.       • ascorbic acid  "tablet 500 mg  500 mg Oral DAILY Andrzej Larsen M.D.       • cefepime (MAXIPIME) 2 g in  mL IVPB  2 g Intravenous Q8HRS Andrzej Larsen M.D.       • MD Alert...Vancomycin per Pharmacy   Other pharmacy to dose Andrzej Larsen M.D.       • SODIUM CHLORIDE 0.9 % IV SOLN            • SODIUM CHLORIDE 0.9 % IV SOLN            • NOREPINEPHRINE BITARTRATE 1 MG/ML IV SOLN            • SODIUM CHLORIDE 0.9 % IV SOLN            • SODIUM CHLORIDE 0.9 % IV SOLN            • morphine (pf) 4 mg/ml injection                Social History     Social History   • Marital status:      Spouse name: N/A   • Number of children: N/A   • Years of education: N/A     Occupational History   • Not on file.     Social History Main Topics   • Smoking status: Current Some Day Smoker     Packs/day: 0.00     Years: 30.00     Types: Cigarettes   • Smokeless tobacco: Never Used      Comment: 1/2-1.5 packs for 30 years   • Alcohol use No   • Drug use: No   • Sexual activity: Not on file     Other Topics Concern   • Not on file     Social History Narrative   • No narrative on file       Family History   Problem Relation Age of Onset   • Diabetes Mother    • Stroke Mother    • Leukemia Mother    • Diabetes Father    • No Known Problems Sister    • Heart Disease Brother         PPM   • Lung Disease Brother    • Alcohol/Drug Brother    • Diabetes Sister        Allergies:  Cillins [penicillins]; Erythromycin; Metoprolol; and Shellfish allergy    Review of Systems:  Report postive as noted above otherwise negative  Not able to review do th patient condition    Physical Exam:  Blood pressure 126/68, pulse 74, temperature 36 °C (96.8 °F), resp. rate 20, height 1.956 m (6' 5.01\"), weight (!) 156.5 kg (345 lb 0.3 oz), SpO2 92 %.    Constitutional: critically ill  Head: No cephalohematoma. Pupils reactive No bleeding ears nose or mouth  Neck: No tracheal deviation. No stridor.  Cardiovascular: Normal rate,skin warm brisk cap " refill.  Pulmonary/Chest:bipap dyspnea decrease sounds right  Bruising present  Abdominal: Soft, nondistended. Nontender to palpation.   Musculoskeletal: vascular changes lower extremities  Moving all  Back: Midline thoracic and lumbar spines are nontender to palpation. No step-offs. Mild sacral erythema present.  Neurological: GCS 15 .   Skin: Skin is warm and dry.    Psychiatric:  distress       Labs:  Recent Labs      11/06/18 2257   WBC  20.3*   RBC  4.67*   HEMOGLOBIN  8.6*   HEMATOCRIT  33.4*   MCV  71.5*   MCH  18.4*   MCHC  25.7*   RDW  69.1*   PLATELETCT  290     Recent Labs      11/06/18 2257   SODIUM  134*   POTASSIUM  4.7   CHLORIDE  99   CO2  29   GLUCOSE  153*   BUN  32*   CREATININE  1.28   CALCIUM  9.2     Recent Labs      11/06/18 2257 11/07/18   0800   APTT  57.9*   --    INR  3.88*  3.73*     Recent Labs      11/06/18 2257 11/07/18   0800   ASTSGOT  25   --    ALTSGPT  23   --    TBILIRUBIN  0.7   --    ALKPHOSPHAT  118*   --    GLOBULIN  4.0*   --    INR  3.88*  3.73*       Radiology:  DX-ELBOW-COMPLETE 3+ RIGHT   Final Result      1.  No evidence of acute fracture or dislocation.      2.  Soft tissue swelling.      3.  Mild degenerative change.      4.  Joint effusion.      DX-CHEST-PORTABLE (1 VIEW)   Final Result      1.  Cardiomegaly with pulmonary edema.      2.  Large right pleural effusion with right lung base atelectasis.      CT-CTA CHEST PULMONARY ARTERY W/ RECONS    (Results Pending)   DX-CHEST-LIMITED (1 VIEW)    (Results Pending)   DX-CHEST-PORTABLE (1 VIEW)    (Results Pending)         Assessment: This is a 52 y.o. criticall ill  Respiratory ditress  Right effusion    Plan:   Discussed findings and plan with patient  Emergent placement chest tube  discussed risks to include but not limited to death, massive bleeding due to coags, incomplete draiange, misplaced chest tube Need for further therapy  All questions discussed  Informed consent obtained    Active Hospital  Problems    Diagnosis   • Acute on chronic respiratory failure ([facility]) [J96.20]     Priority: High   • Chronic obstructive pulmonary disease with acute exacerbation ([facility]) [J44.1]     Priority: High   • Pneumonia due to infectious organism [J18.9]     Priority: High   • Heart failure with preserved ejection fraction, borderline, class IV ([facility]) [I50.30]     Priority: Medium   • Paroxysmal atrial fibrillation ([facility]) [I48.0]     Priority: Medium   • CAD (coronary artery disease) [I25.10]     Priority: Medium   • Acute exacerbation of CHF (congestive heart failure) ([facility]) [I50.9]     Priority: Medium   • Elevated troponin [R74.8]     Priority: Low   • Closed fracture of multiple ribs of right side [S22.41XA]     Priority: Low   • Morbid obesity with BMI of 40.0-44.9, adult ([facility]) [E66.01, Z68.41]     Priority: Low   • Microcytic anemia [D50.9]     Priority: Low   • SHASHANK (obstructive sleep apnea) [G47.33]     Priority: Low   • Leukocytosis [D72.829]     Priority: Low         Jin Zavaleta MD, FACS  [facility]   [phone]

## 2018-11-07 NOTE — PROGRESS NOTES
0915: Thoracentesis performed by Dr. Oconnor ([facility]) and Dr. Peace (intensivist). Minimal bloody output received, enough for 3 vial samples sent down to lab. Surgery consulted for chest tube placement.    1045: Dr. Zavaleta at bedside to perform right chest tube placement. 2500 mL of bloody secretions output immediately after insertion. Pleurevac replaced and another 330mL out by 1200. Patient became transiently hypotensive (SBP 60s), anxious, and tachypnic. Levo mixxed and 200 mL NS bolus. Levo never started as patients pressures normalized. 2 mg of Morphine administered with good effect.

## 2018-11-07 NOTE — PROGRESS NOTES
Patient brought to room 628 from ER red 9. Accompanied by 3 RN's and RT. Skin check done by 2 RN's and multiple pictures taken and uploaded (see note to follow). Patient is resting comfortably on Bipap O2sat 98%. Vital signs are stable, will continue to monitor patient's progress.

## 2018-11-07 NOTE — CONSULTS
Critical Care Consultation    Date of consult: 11/7/2018    Referring Physician  Red Sevilla M.D.    Reason for Consultation  Acute hypoxemic respiratory failure requiring BiPAP in the emergency room    History of Presenting Illness  52 y.o. male who presented 11/6/2018 with shortness of breath which is progressively worse over a couple of days.  Patient has increased cough and purulent sputum production.  Patient has orthopnea and PND.  Chest pain on the right side has significantly lessened over the last week since original fall with his most recent admission.  Patient has known COPD and has chronic respiratory failure and is on home O2.  Patient has SHASHANK CPAP.  Patient has known coronary artery disease.  Patient was recently admitted with a COPD exacerbation unfortunately had a fall from the EMS gurney and suffered rib fractures on rib 5 through 8 on the right chest resulting in able pneumothorax which surgery monitored and it subsequently resolved without any intervention being needed and he was discharged on 10/31.  Of note the patient's wife and grandson have been sick with respiratory symptoms lately.  The patient states he has been compliant with medications.  He has a heavy smoking history and he quit with his last hospitalization and denied restarting tobacco use since discharge.  He feels much better since being started on BiPAP therapy.  CT scan chest is still pending.  Attempts to perform the study prior to initiating BiPAP were unsuccessful due to orthopnea.    Code Status  Full Code    Review of Systems  Review of Systems   Constitutional: Negative for diaphoresis and fever.   HENT: Negative for congestion and sore throat.    Eyes: Negative for visual disturbance.   Respiratory: Positive for cough and shortness of breath.         Purulent sputum production, no hemoptysis, positive orthopnea, positive PND   Cardiovascular: Positive for chest pain (Improved pain right chest) and leg swelling.    Gastrointestinal: Negative for abdominal pain, nausea and vomiting.   Genitourinary: Negative for dysuria, flank pain and hematuria.   Musculoskeletal: Positive for back pain.   Neurological: Negative for weakness, numbness and headaches.   Hematological: Does not bruise/bleed easily.   Psychiatric/Behavioral: Negative for agitation. The patient is not nervous/anxious.      Past Medical History   has a past medical history of ASTHMA; Atrial fibrillation (HCC); CAD (coronary artery disease); Chronic obstructive pulmonary disease (HCC); Congestive heart failure (HCC); and Diabetes.    Surgical History   has a past surgical history that includes other abdominal surgery; multiple coronary artery bypass endo vein harvest (12/22/2017); oscar (12/22/2017); thoracoscopy (Left, 1/25/2018); and colonoscopy - endo (N/A, 7/25/2018).    Family History  family history includes Alcohol/Drug in his brother; Diabetes in his father, mother, and sister; Heart Disease in his brother; Leukemia in his mother; Lung Disease in his brother; No Known Problems in his sister; Stroke in his mother.    Social History   reports that he has been smoking Cigarettes.  He has been smoking about 0.00 packs per day for the past 30.00 years. He has never used smokeless tobacco. He reports that he does not drink alcohol or use drugs.    Medications  Home Medications    **Home medications have not yet been reviewed for this encounter**       Current Facility-Administered Medications   Medication Dose Route Frequency Provider Last Rate Last Dose   • Respiratory Care per Protocol   Nebulization Continuous RT Andrzej Larsen M.D.       • senna-docusate (PERICOLACE or SENOKOT S) 8.6-50 MG per tablet 2 Tab  2 Tab Oral BID Andrzej Larsen M.D.        And   • polyethylene glycol/lytes (MIRALAX) PACKET 1 Packet  1 Packet Oral QDAY PRN Andrzej Larsen M.D.        And   • magnesium hydroxide (MILK OF MAGNESIA) suspension 30 mL  30 mL Oral QDAY PRN Andrzej ULLOA  LISSA Larsen        And   • bisacodyl (DULCOLAX) suppository 10 mg  10 mg Rectal QDAY PRN Andrzej Larsen M.D.       • insulin lispro (HUMALOG) injection 1-6 Units  1-6 Units Subcutaneous Q6HRS Andrzej Larsen M.D.        And   • glucose 4 g chewable tablet 16 g  16 g Oral Q15 MIN PRN Andrzej Larsen M.D.        And   • dextrose 50% (D50W) injection 25 mL  25 mL Intravenous Q15 MIN PRN Andrzej Larsen M.D.       • methylPREDNISolone (SOLU-MEDROL) 40 MG injection 40 mg  40 mg Intravenous Q6HRS Andrzej Larsen M.D.       • ipratropium-albuterol (DUONEB) nebulizer solution  3 mL Nebulization Q4HRS (RT) Andrzej Larsen M.D.       • cefTRIAXone (ROCEPHIN) 2 g in  mL IVPB  2 g Intravenous Q24HRS Andrzej Larsen M.D.       • doxycycline monohydrate (ADOXA) tablet 100 mg  100 mg Oral Q12HRS Andrzej Larsen M.D.       • albuterol inhaler 2 Puff  2 Puff Inhalation Q6HRS PRN Andrzej Larsen M.D.       • amiodarone (CORDARONE) tablet 200 mg  200 mg Oral DAILY Andrzej Larsen M.D.       • budesonide-formoterol (SYMBICORT) 160-4.5 MCG/ACT inhaler 2 Puff  2 Puff Inhalation BID Andrzej Larsen M.D.       • calcium carbonate (TUMS) chewable tab 500 mg  500 mg Oral BID PRN Andrzej Larsen M.D.       • ferrous sulfate tablet 325 mg  325 mg Oral QDAY with Breakfast Andrzej Larsen M.D.       • guaiFENesin LA (MUCINEX) tablet 600 mg  600 mg Oral Q12HRS Andrzej Larsen M.D.       • lisinopril (PRINIVIL) tablet 2.5 mg  2.5 mg Oral DAILY Andrzej Larsen M.D.       • metoprolol SR (TOPROL XL) tablet 25 mg  25 mg Oral DAILY Andrzej Larsen M.D.       • oxyCODONE-acetaminophen (PERCOCET) 5-325 MG per tablet 1 Tab  1 Tab Oral Q6HRS PRN Andrzej Larsen M.D.       • rosuvastatin (CRESTOR) TABS 40 mg  40 mg Oral DAILY Andrzej Larsen M.D.       • tiotropium (SPIRIVA) 18 MCG inhalation capsule 1 Cap  1 Cap Inhalation DAILY Andrzej Larsen M.D.       • spironolactone (ALDACTONE) tablet 25 mg  25 mg Oral  DAILY Andrzej Larsen M.D.       • MD Alert...Warfarin per Pharmacy   Other pharmacy to dose Andrzej Larsen M.D.       • ascorbic acid tablet 500 mg  500 mg Oral DAILY Andrzej Larsen M.D.       • furosemide (LASIX) injection 20 mg  20 mg Intravenous BID DIURETIC Andrzej Larsen M.D.       • NS infusion 4,803 mL  30 mL/kg Intravenous Once Red Sevilla M.D.       • ipratropium-albuterol (DUONEB) nebulizer solution  3 mL Nebulization Once Red Sevilla M.D.   Stopped at 11/06/18 6725   • aspirin (ASA) chewable tab 324 mg  324 mg Oral Once Red Sevilla M.D.       • cefepime (MAXIPIME) 2 g in  mL IVPB  2 g Intravenous Once Red Sevilla M.D.       • vancomycin 3,000 mg in  mL IVPB  3 g Intravenous Once Red Sevilla M.D. 166.7 mL/hr at 11/07/18 0055 3,000 mg at 11/07/18 0055     Current Outpatient Prescriptions   Medication Sig Dispense Refill   • oxyCODONE-acetaminophen (PERCOCET) 5-325 MG Tab Take 1-2 Tabs by mouth every four hours as needed for Severe Pain for up to 7 days. 30 Tab 0   • oxyCODONE-acetaminophen (PERCOCET) 5-325 MG Tab Take 1-2 Tabs by mouth every four hours as needed for up to 7 days. 30 Tab 0   • ciprofloxacin (CIPRO) 500 MG Tab Take 1 Tab by mouth 2 times a day. 20 Tab 0   • calcium carbonate (TUMS) 500 MG Chew Tab Take 1 Tab by mouth 2 times a day as needed (Reflux). 30 Tab 0   • albuterol 108 (90 Base) MCG/ACT Aero Soln inhalation aerosol Inhale 2 Puffs by mouth every 6 hours as needed for Shortness of Breath. 1 Inhaler 15   • guaiFENesin LA (MUCINEX) 600 MG TABLET SR 12 HR Take 1 Tab by mouth every 12 hours. 28 Tab 5   • furosemide (LASIX) 20 MG Tab Take 2 Tabs by mouth 2 times a day for 7 days. 28 Tab 0   • ferrous sulfate 325 (65 Fe) MG tablet Take 1 Tab by mouth every morning with breakfast. 30 Tab 3   • acetaminophen (TYLENOL) 500 MG Tab Take 2 Tabs by mouth every 8 hours. 30 Tab 0   • furosemide (LASIX) 40 MG Tab Take 1 Tab by mouth every day.  Start after 7 days, 40 BID for 7 days 30 Tab 2   • tiotropium (SPIRIVA) 18 MCG Cap Inhale 1 Cap by mouth every day. 30 Cap 5   • potassium chloride SA (KDUR) 20 MEQ Tab CR Take 1 Tab by mouth every day for 7 days. 7 Tab 0   • potassium chloride SA (K-DUR) 10 MEQ Tab CR Take 1 Tab by mouth every day. Please take after 7 days with Lasix daily 30 Tab 1   • predniSONE (DELTASONE) 10 MG Tab Take 1 Tab by mouth every day for 12 days. Take 60mg x 2 days, 50mg x2 days, 40mg x2 days, 30 mg x2 days, 20mg x2 days, 10mg x2 days 42 Tab 0   • ipratropium-albuterol (DUONEB) 0.5-2.5 (3) MG/3ML nebulizer solution 3 mL by Nebulization route every 6 hours as needed for Shortness of Breath. 180 Bullet 10   • warfarin (COUMADIN) 5 MG Tab Take 1 Tab by mouth COUMADIN-DAILY. 30 Tab 0   • budesonide-formoterol (SYMBICORT) 160-4.5 MCG/ACT Aerosol Inhale 2 Puffs by mouth 2 Times a Day.     • rosuvastatin (CRESTOR) 40 MG tablet Take 40 mg by mouth every day.     • lisinopril (PRINIVIL) 2.5 MG Tab Take 1 Tab by mouth every day. 30 Tab 3   • metoprolol SR (TOPROL XL) 25 MG TABLET SR 24 HR Take 1 Tab by mouth every day. 30 Tab 3   • spironolactone (ALDACTONE) 25 MG Tab Take 1 Tab by mouth every day. 30 Tab 3   • amiodarone (CORDARONE) 200 MG Tab Take 1 Tab by mouth every day. 90 Tab 3       Allergies  Allergies   Allergen Reactions   • Cillins [Penicillins] Anaphylaxis and Rash     As a child, tolerated cefepime (12/2017), ceftriaxone (1/2018), cefazolin (12/2017)   • Erythromycin Anaphylaxis     Rxn - 1994   • Metoprolol      Pt stated got latham and aggressive, in demi dose's per wife.      • Shellfish Allergy Rash     Pt stated that he is allergic to all seafood, will develop a rash and says that rash will clear up with benadryl       Vital Signs last 24 hours  Temp:  [37 °C (98.6 °F)] 37 °C (98.6 °F)  Pulse:  [80-86] 80  Resp:  [22-26] 24  BP: (126)/(68) 126/68    Physical Exam  Physical Exam   Constitutional: He is oriented to person, place,  and time. He appears well-developed and well-nourished. He is cooperative.  Non-toxic appearance. No distress (On BiPAP). Face mask in place.   HENT:   Mouth/Throat: Oropharynx is clear and moist.   Eyes: Pupils are equal, round, and reactive to light. No scleral icterus.   Neck: Neck supple. No JVD present.   Cardiovascular: Normal rate, regular rhythm and intact distal pulses.    No murmur heard.  Pulmonary/Chest: He has wheezes. He has rales. He exhibits tenderness (Right chest, old/improved).   Abdominal: Soft. Bowel sounds are normal. He exhibits no distension. There is no tenderness. There is no guarding.   Musculoskeletal: He exhibits edema. He exhibits no tenderness.   Neurological: He is alert and oriented to person, place, and time. A cranial nerve deficit is present. No sensory deficit. GCS eye subscore is 4. GCS verbal subscore is 5. GCS motor subscore is 6.   Skin: Skin is warm and dry. He is not diaphoretic. There is erythema. No cyanosis. Nails show no clubbing.   Chronic venous stasis and some erythema bilaterally lower extremities, appears more chronic than acute   Psychiatric: He has a normal mood and affect. His behavior is normal. Thought content normal.       Fluids  No intake or output data in the 24 hours ending 11/07/18 0122    Laboratory  Recent Results (from the past 48 hour(s))   Lactic acid (lactate)    Collection Time: 11/06/18 10:57 PM   Result Value Ref Range    Lactic Acid 1.1 0.5 - 2.0 mmol/L   CBC WITH DIFFERENTIAL    Collection Time: 11/06/18 10:57 PM   Result Value Ref Range    WBC 20.3 (H) 4.8 - 10.8 K/uL    RBC 4.67 (L) 4.70 - 6.10 M/uL    Hemoglobin 8.6 (L) 14.0 - 18.0 g/dL    Hematocrit 33.4 (L) 42.0 - 52.0 %    MCV 71.5 (L) 81.4 - 97.8 fL    MCH 18.4 (L) 27.0 - 33.0 pg    MCHC 25.7 (L) 33.7 - 35.3 g/dL    RDW 69.1 (H) 35.9 - 50.0 fL    Platelet Count 290 164 - 446 K/uL    Neutrophils-Polys 85.20 (H) 44.00 - 72.00 %    Lymphocytes 6.10 (L) 22.00 - 41.00 %    Monocytes 6.10  0.00 - 13.40 %    Eosinophils 1.70 0.00 - 6.90 %    Basophils 0.00 0.00 - 1.80 %    Nucleated RBC 0.30 /100 WBC    Neutrophils (Absolute) 17.30 (H) 1.82 - 7.42 K/uL    Lymphs (Absolute) 1.24 1.00 - 4.80 K/uL    Monos (Absolute) 1.24 (H) 0.00 - 0.85 K/uL    Eos (Absolute) 0.35 0.00 - 0.51 K/uL    Baso (Absolute) 0.00 0.00 - 0.12 K/uL    NRBC (Absolute) 0.07 K/uL    Hypochromia 2+     Anisocytosis 2+     Microcytosis 2+    COMP METABOLIC PANEL    Collection Time: 11/06/18 10:57 PM   Result Value Ref Range    Sodium 134 (L) 135 - 145 mmol/L    Potassium 4.7 3.6 - 5.5 mmol/L    Chloride 99 96 - 112 mmol/L    Co2 29 20 - 33 mmol/L    Anion Gap 6.0 0.0 - 11.9    Glucose 153 (H) 65 - 99 mg/dL    Bun 32 (H) 8 - 22 mg/dL    Creatinine 1.28 0.50 - 1.40 mg/dL    Calcium 9.2 8.5 - 10.5 mg/dL    AST(SGOT) 25 12 - 45 U/L    ALT(SGPT) 23 2 - 50 U/L    Alkaline Phosphatase 118 (H) 30 - 99 U/L    Total Bilirubin 0.7 0.1 - 1.5 mg/dL    Albumin 3.3 3.2 - 4.9 g/dL    Total Protein 7.3 6.0 - 8.2 g/dL    Globulin 4.0 (H) 1.9 - 3.5 g/dL    A-G Ratio 0.8 g/dL   BNP    Collection Time: 11/06/18 10:57 PM   Result Value Ref Range    B Natriuretic Peptide 445 (H) 0 - 100 pg/mL   TROPONIN    Collection Time: 11/06/18 10:57 PM   Result Value Ref Range    Troponin I 0.14 (H) 0.00 - 0.04 ng/mL   ESTIMATED GFR    Collection Time: 11/06/18 10:57 PM   Result Value Ref Range    GFR If African American >60 >60 mL/min/1.73 m 2    GFR If Non African American 58 (A) >60 mL/min/1.73 m 2   DIFFERENTIAL MANUAL    Collection Time: 11/06/18 10:57 PM   Result Value Ref Range    Metamyelocytes 0.90 %    Manual Diff Status PERFORMED    PERIPHERAL SMEAR REVIEW    Collection Time: 11/06/18 10:57 PM   Result Value Ref Range    Peripheral Smear Review see below    PLATELET ESTIMATE    Collection Time: 11/06/18 10:57 PM   Result Value Ref Range    Plt Estimation Normal    MORPHOLOGY    Collection Time: 11/06/18 10:57 PM   Result Value Ref Range    RBC Morphology  Present     Large Platelets 1+     Polychromia 2+     Poikilocytosis 2+     Ovalocytes 2+     Schistocytes 1+     Target Cells 1+     Tear Drop Cells 2+     Echinocytes 1+     Stomatocytes 2+    EKG    Collection Time: 18 11:08 PM   Result Value Ref Range    Report       [facility] Emergency Dept.    Test Date:  2018  Pt Name:    JARRETT WHITE                   Department: ER  MRN:        [id number]                      Room:       [facility]  Gender:     Male                         Technician: [id number]  :        1965                   Requested By:RED RAY  Order #:    [id number]                    Reading MD: Red Ray MD    Measurements  Intervals                                Axis  Rate:       81                           P:          0  RI:         163                          QRS:        -47  QRSD:       108                          T:          128  QT:         416  QTc:        483    Interpretive Statements  SINUS RHYTHM  LEFT ANTERIOR FASCICULAR BLOCK  LATE PRECORDIAL R/S TRANSITION  NONSPECIFIC T ABNORMALITIES, LATERAL LEADS  BORDERLINE PROLONGED QT INTERVAL  Compared to ECG 2018 16:04:47  Left anterior fascicular block now present  Atrial premature complex(es) no longer present  Left-axis deviation n o longer present  T-wave abnormality still present    Electronically Signed On 2018 0:20:25 PST by Red Ray MD         Imaging  DX-ELBOW-COMPLETE 3+ RIGHT   Final Result      1.  No evidence of acute fracture or dislocation.      2.  Soft tissue swelling.      3.  Mild degenerative change.      4.  Joint effusion.      DX-CHEST-PORTABLE (1 VIEW)   Final Result      1.  Cardiomegaly with pulmonary edema.      2.  Large right pleural effusion with right lung base atelectasis.      CT-CTA CHEST PULMONARY ARTERY W/ RECONS    (Results Pending)       Assessment/Plan  * Acute on chronic respiratory failure (HCC)- (present on admission)   Assessment & Plan     BiPAP therapy -much improved work of breathing after initiating therapy in the ER and continuing it in the ICU  Treatment for pneumonia and COPD exacerbation and to a lesser degree congestive heart failure   Right-sided pleural effusion also contributing significantly and patient needs thoracentesis after CT chest  RT protocols  Monitor for the need for intubation and mechanical ventilation  If intubated diagnostic and therapeutic bronchoscopy will be necessary and appropriate     Pneumonia due to infectious organism- (present on admission)   Assessment & Plan    New right-sided opacities consistent with pneumonia with probable secondary pleural effusion  Hemoglobin stable, doubt hemothorax from recent rib fracture but eval appropriate  CT scan chest pending, was not performed earlier due to respiratory issues, going this a.m. to the scanner  May need thoracentesis  INR 3.73, holding warfarin, may need FFP/vitamin K for reversal for safe invasive procedure and right chest  Pan culture sent in ER  If fails from a respiratory standpoint and requires intubation patient should receive a diagnostic and therapeutic bronchoscopy with probable right lower lobe BAL, CT and initial bronchoscopic findings can be used to further guide diagnostic and therapeutic approach  Intravenous antibiotics to cover healthcare associated organisms, de-escalate rapidly as clinically appropriate, plan probable 7-day course given severity of his current state and multiple comorbidities  Mobilize/aggressive pulmonary toilet as respiratory status and hemodynamics allow  RT protocols and every 4 nebulizer treatments, CPT to right base if chest discomfort from recent rib fractures will allow     Chronic obstructive pulmonary disease with acute exacerbation (HCC)- (present on admission)   Assessment & Plan    BiPAP  RT protocols  IV steroids  Nebulized treatments with DuoNeb every 4 and every 2 as needed  Mucolytic's  Monitor for the need for  intubation and mechanical ventilation     Heart failure with preserved ejection fraction, borderline, class IV ([facility])- (present on admission)   Assessment & Plan    Patient with more right than left heart failure at this time, aggressive diuresis not necessary  Forced diuresis as needed - Spironolactone/furosemide  Afterload reduction as needed, goal -140, ACE inhibitor       Paroxysmal atrial fibrillation ([facility])- (present on admission)   Assessment & Plan    In sinus rhythm by EKG on admit with controlled heart rate  Continue amiodarone  Optimize electrolytes  Additional rate control with calcium channel blockers or beta blockers as needed, would prefer calcium channel blockers with bronchospasm at this time  INR a bit high and admission at 3.73, will hold warfarin today and follow serial coagulation studies  May need to reverse anticoagulation in part to perform safe thoracentesis       CAD (coronary artery disease)- (present on admission)   Assessment & Plan    History of coronary artery disease and prior coronary artery bypass  Cardiac monitoring  Echocardiogram  Serial troponin I  Cardiac consultation as needed  Fully anticoagulated with warfarin, was not on aspirin  Statin     Elevated troponin   Assessment & Plan    Suspect demand ischemia, serial troponin I pending  Echocardiogram  Cardiac monitoring  Cardiology consultation if clinically appropriate, monitoring     Morbid obesity with BMI of 40.0-44.9, adult ([facility])- (present on admission)   Assessment & Plan    Behavior modification weight loss to be encouraged     Closed fracture of multiple ribs of right side- (present on admission)   Assessment & Plan    Recent fall off EMS [facility] by report with rib 5 through 8 fractures noted radiographically and initial x-ray complicated by small pneumothorax which resolved with conservative therapy  Patient also injured right elbow and right hand  Clinically improved per patient with decreased pain and no  pneumothorax seen on chest x-ray  Had been followed by surgery, reconsult if needed, does not appear to be necessary at this time     Microcytic anemia- (present on admission)   Assessment & Plan    Transfuse to hemoglobin greater than 7.0  Serial CBC  Stool occult studies  GI eval if indicated  Given significant comorbidities with cardiopulmonary issues the patient is not a very good candidate for therapy or diagnostics at this time     SHASHANK (obstructive sleep apnea)- (present on admission)   Assessment & Plan    Continue positive airway pressure with sleep both day and night  Weight loss to be encouraged  Patient can use home mask and equipment when clinically appropriate, requiring our BiPAP unit at this time     Leukocytosis- (present on admission)   Assessment & Plan    Persistent leukocytosis noted upon review of the labs from most recent admits  Patient has been on steroids  Current chest x-ray suggest possible pneumonia  Evaluation ongoing  Serial CBC         Discussed patient condition and risk of morbidity and/or mortality with RN, RT, Pharmacy, Patient and ERP and CIC charge nurse.    The patient remains critically ill.  Critical care time = 55 minutes in directly providing and coordinating critical care and extensive data review.  No time overlap and excludes procedures.

## 2018-11-07 NOTE — ED PROVIDER NOTES
ED PROVIDER NOTE     Scribed for Red Sevilla M.D. by Josiah Ahuja. 11/6/2018, 10:36 PM.    CHIEF COMPLAINT  Chief Complaint   Patient presents with   • Shortness of Breath       HPI  Primary care provider: Nam Le M.D.  Means of arrival: EMS  History obtained from: Patient  History limited by: None     Joshua Medrano is a 52 y.o. male with a PMHx of Diabetes, asthma, CAD, COPD, A Fib, CHF who was transported via EMS for shortness of breath.   The patient complains of an onset of worsening shortness of breath tonight about 2 hours prior to ED arrival after lying down on his back. The patient has a history of chronic lower extremity edema, but reports increased bilateral lower leg swelling tonight associated with his increased shortness of breath.     The patient denies any alleviating factors of his shortness of breath, and called EMS for transport to the ED. EMS treated the patient with two Albuterol treatments and one Duoneb treatment en route to the ED with some minimal relief of his shortness of breath. The patient is normally on 2L of supplemental oxygen at night.   The patient was seen in this ED a few days ago with asthma exacerbation, and was found to have pneumonia.    The patient denies any fever, chills, chest pain, nausea, vomiting, abdominal pain, diarrhea.        REVIEW OF SYSTEMS  Constitutional: Negative for fever or chills.   HENT: Negative for nosebleeds or sore throat.    Eyes: Negative for vision changes or discharge.   Respiratory: Negative for cough. Positive for shortness of breath.   Cardiovascular: Negative for chest pain or palpitations.   Gastrointestinal: Negative for nausea, vomiting, abdominal pain.   Genitourinary: Negative for dysuria or flank pain.   Musculoskeletal: Negative for back pain or joint pain. Positive for increased bilateral leg swelling.   Skin: Negative for itching or rash.   Neurological: Negative for sensory or motor changes.   Endo/Heme/Allergies:  Negative for weight changes or hives.   Psychiatric/Behavioral: Negative for depression or suicidal ideas.   All other systems reviewed and are negative.    PAST MEDICAL HISTORY   has a past medical history of ASTHMA; Atrial fibrillation (HCC); CAD (coronary artery disease); Chronic obstructive pulmonary disease (HCC); Congestive heart failure (HCC); and Diabetes.    PAST FAMILY HISTORY  Family History   Problem Relation Age of Onset   • Diabetes Mother    • Stroke Mother    • Leukemia Mother    • Diabetes Father    • No Known Problems Sister    • Heart Disease Brother         PPM   • Lung Disease Brother    • Alcohol/Drug Brother    • Diabetes Sister      SOCIAL HISTORY  Social History     Social History Main Topics   • Smoking status: Current Some Day Smoker     Packs/day: 0.00     Years: 30.00     Types: Cigarettes   • Smokeless tobacco: Never Used      Comment: 1/2-1.5 packs for 30 years   • Alcohol use No   • Drug use: No     SURGICAL HISTORY   has a past surgical history that includes other abdominal surgery; multiple coronary artery bypass endo vein harvest (12/22/2017); oscar (12/22/2017); thoracoscopy (Left, 1/25/2018); and colonoscopy - endo (N/A, 7/25/2018).    CURRENT MEDICATIONS  Current Outpatient Prescriptions on File Prior to Encounter   Medication Sig Dispense Refill   • oxyCODONE-acetaminophen (PERCOCET) 5-325 MG Tab Take 1-2 Tabs by mouth every four hours as needed for Severe Pain for up to 7 days. 30 Tab 0   • oxyCODONE-acetaminophen (PERCOCET) 5-325 MG Tab Take 1-2 Tabs by mouth every four hours as needed for up to 7 days. 30 Tab 0   • ciprofloxacin (CIPRO) 500 MG Tab Take 1 Tab by mouth 2 times a day. 20 Tab 0   • calcium carbonate (TUMS) 500 MG Chew Tab Take 1 Tab by mouth 2 times a day as needed (Reflux). 30 Tab 0   • albuterol 108 (90 Base) MCG/ACT Aero Soln inhalation aerosol Inhale 2 Puffs by mouth every 6 hours as needed for Shortness of Breath. 1 Inhaler 15   • guaiFENesin LA (MUCINEX) 600  MG TABLET SR 12 HR Take 1 Tab by mouth every 12 hours. 28 Tab 5   • furosemide (LASIX) 20 MG Tab Take 2 Tabs by mouth 2 times a day for 7 days. 28 Tab 0   • ferrous sulfate 325 (65 Fe) MG tablet Take 1 Tab by mouth every morning with breakfast. 30 Tab 3   • acetaminophen (TYLENOL) 500 MG Tab Take 2 Tabs by mouth every 8 hours. 30 Tab 0   • furosemide (LASIX) 40 MG Tab Take 1 Tab by mouth every day. Start after 7 days, 40 BID for 7 days 30 Tab 2   • tiotropium (SPIRIVA) 18 MCG Cap Inhale 1 Cap by mouth every day. 30 Cap 5   • potassium chloride SA (KDUR) 20 MEQ Tab CR Take 1 Tab by mouth every day for 7 days. 7 Tab 0   • potassium chloride SA (K-DUR) 10 MEQ Tab CR Take 1 Tab by mouth every day. Please take after 7 days with Lasix daily 30 Tab 1   • predniSONE (DELTASONE) 10 MG Tab Take 1 Tab by mouth every day for 12 days. Take 60mg x 2 days, 50mg x2 days, 40mg x2 days, 30 mg x2 days, 20mg x2 days, 10mg x2 days 42 Tab 0   • ipratropium-albuterol (DUONEB) 0.5-2.5 (3) MG/3ML nebulizer solution 3 mL by Nebulization route every 6 hours as needed for Shortness of Breath. 180 Bullet 10   • warfarin (COUMADIN) 5 MG Tab Take 1 Tab by mouth COUMADIN-DAILY. 30 Tab 0   • budesonide-formoterol (SYMBICORT) 160-4.5 MCG/ACT Aerosol Inhale 2 Puffs by mouth 2 Times a Day.     • rosuvastatin (CRESTOR) 40 MG tablet Take 40 mg by mouth every day.     • lisinopril (PRINIVIL) 2.5 MG Tab Take 1 Tab by mouth every day. 30 Tab 3   • metoprolol SR (TOPROL XL) 25 MG TABLET SR 24 HR Take 1 Tab by mouth every day. 30 Tab 3   • spironolactone (ALDACTONE) 25 MG Tab Take 1 Tab by mouth every day. 30 Tab 3   • amiodarone (CORDARONE) 200 MG Tab Take 1 Tab by mouth every day. 90 Tab 3        ALLERGIES  Allergies   Allergen Reactions   • Cillins [Penicillins] Anaphylaxis and Rash     As a child, tolerated cefepime (12/2017), ceftriaxone (1/2018), cefazolin (12/2017)   • Erythromycin Anaphylaxis     Rxn - 1994   • Metoprolol      Pt stated got latham and  "aggressive, in demi dose's per wife.      • Shellfish Allergy Rash     Pt stated that he is allergic to all seafood, will develop a rash and says that rash will clear up with benadryl     PHYSICAL EXAM  VITAL SIGNS: /68   Pulse 86   Temp 37 °C (98.6 °F)   Resp (!) 22   Ht 1.956 m (6' 5\")   Wt (!) 160.1 kg (352 lb 15.3 oz)   SpO2 99%   BMI 41.85 kg/m²    Pulse ox interpretation: On supplemental oxygen, I interpret this pulse ox as normal.  Constitutional: Chronically ill appearing in moderate distress  HEENT: Normocephalic, atraumatic. Posterior pharynx clear, mucous membranes are dry.  Eyes:  EOMI. Pale conjunctivae.   Neck: Supple, Full range of motion, nontender.  Chest/Pulmonary:  Coarse breath sounds with expiratory wheezing in upper lung fields, very diminished lung fields right worse than left.  Cardiovascular: Distant heart sounds, no obvious murmur.  Abdomen: Obese, soft, nontender, no rebound, guarding, or masses.  Back: No CVA tenderness, nontender midline, no step offs.  Musculoskeletal: No deformity, 2+ bilateral symmetric edema to above the knees.   Neuro: Clear speech, normal coordination, cranial nerves II-XII grossly intact.  Psych: Normal mood but flat affect.  Skin: Bruising and swelling over right elbow.  Bilateral lower extremity venous stasis changes.         DIAGNOSTIC STUDIES / PROCEDURES    LABS & EKG  Results for orders placed or performed during the hospital encounter of 11/06/18   Lactic acid (lactate)   Result Value Ref Range    Lactic Acid 1.1 0.5 - 2.0 mmol/L   CBC WITH DIFFERENTIAL   Result Value Ref Range    WBC 20.3 (H) 4.8 - 10.8 K/uL    RBC 4.67 (L) 4.70 - 6.10 M/uL    Hemoglobin 8.6 (L) 14.0 - 18.0 g/dL    Hematocrit 33.4 (L) 42.0 - 52.0 %    MCV 71.5 (L) 81.4 - 97.8 fL    MCH 18.4 (L) 27.0 - 33.0 pg    MCHC 25.7 (L) 33.7 - 35.3 g/dL    RDW 69.1 (H) 35.9 - 50.0 fL    Platelet Count 290 164 - 446 K/uL    Neutrophils-Polys 85.20 (H) 44.00 - 72.00 %    Lymphocytes 6.10 " (L) 22.00 - 41.00 %    Monocytes 6.10 0.00 - 13.40 %    Eosinophils 1.70 0.00 - 6.90 %    Basophils 0.00 0.00 - 1.80 %    Nucleated RBC 0.30 /100 WBC    Neutrophils (Absolute) 17.30 (H) 1.82 - 7.42 K/uL    Lymphs (Absolute) 1.24 1.00 - 4.80 K/uL    Monos (Absolute) 1.24 (H) 0.00 - 0.85 K/uL    Eos (Absolute) 0.35 0.00 - 0.51 K/uL    Baso (Absolute) 0.00 0.00 - 0.12 K/uL    NRBC (Absolute) 0.07 K/uL    Hypochromia 2+     Anisocytosis 2+     Microcytosis 2+    COMP METABOLIC PANEL   Result Value Ref Range    Sodium 134 (L) 135 - 145 mmol/L    Potassium 4.7 3.6 - 5.5 mmol/L    Chloride 99 96 - 112 mmol/L    Co2 29 20 - 33 mmol/L    Anion Gap 6.0 0.0 - 11.9    Glucose 153 (H) 65 - 99 mg/dL    Bun 32 (H) 8 - 22 mg/dL    Creatinine 1.28 0.50 - 1.40 mg/dL    Calcium 9.2 8.5 - 10.5 mg/dL    AST(SGOT) 25 12 - 45 U/L    ALT(SGPT) 23 2 - 50 U/L    Alkaline Phosphatase 118 (H) 30 - 99 U/L    Total Bilirubin 0.7 0.1 - 1.5 mg/dL    Albumin 3.3 3.2 - 4.9 g/dL    Total Protein 7.3 6.0 - 8.2 g/dL    Globulin 4.0 (H) 1.9 - 3.5 g/dL    A-G Ratio 0.8 g/dL   BNP   Result Value Ref Range    B Natriuretic Peptide 445 (H) 0 - 100 pg/mL   TROPONIN   Result Value Ref Range    Troponin I 0.14 (H) 0.00 - 0.04 ng/mL   COD (ADULT)   Result Value Ref Range    Antibody Screen-Cod NEG     ABO Grouping Only A     Rh Grouping Only POS    PT/INR   Result Value Ref Range    PT 38.0 (H) 12.0 - 14.6 sec    INR 3.88 (H) 0.87 - 1.13   PTT   Result Value Ref Range    APTT 57.9 (H) 24.7 - 36.0 sec   ESTIMATED GFR   Result Value Ref Range    GFR If African American >60 >60 mL/min/1.73 m 2    GFR If Non African American 58 (A) >60 mL/min/1.73 m 2   DIFFERENTIAL MANUAL   Result Value Ref Range    Metamyelocytes 0.90 %    Manual Diff Status PERFORMED    PERIPHERAL SMEAR REVIEW   Result Value Ref Range    Peripheral Smear Review see below    PLATELET ESTIMATE   Result Value Ref Range    Plt Estimation Normal    MORPHOLOGY   Result Value Ref Range    RBC  Morphology Present     Large Platelets 1+     Polychromia 2+     Poikilocytosis 2+     Ovalocytes 2+     Schistocytes 1+     Target Cells 1+     Tear Drop Cells 2+     Echinocytes 1+     Stomatocytes 2+    PROTHROMBIN TIME   Result Value Ref Range    PT 36.9 (H) 12.0 - 14.6 sec    INR 3.73 (H) 0.87 - 1.13   ACCU-CHEK GLUCOSE   Result Value Ref Range    Glucose - Accu-Ck 177 (H) 65 - 99 mg/dL   ACCU-CHEK GLUCOSE   Result Value Ref Range    Glucose - Accu-Ck 191 (H) 65 - 99 mg/dL   EKG   Result Value Ref Range    Report       [facility]    Test Date:  2018  Pt Name:    JARRETT WHITE                   Department: ER  MRN:        [id number]                      Room:       [facility]  Gender:     Male                         Technician: [id number]  :        1965                   Requested By:RED RAY  Order #:    [id number]                    Reading MD: Red Ray MD    Measurements  Intervals                                Axis  Rate:       81                           P:          0  PA:         163                          QRS:        -47  QRSD:       108                          T:          128  QT:         416  QTc:        483    Interpretive Statements  SINUS RHYTHM  LEFT ANTERIOR FASCICULAR BLOCK  LATE PRECORDIAL R/S TRANSITION  NONSPECIFIC T ABNORMALITIES, LATERAL LEADS  BORDERLINE PROLONGED QT INTERVAL  Compared to ECG 2018 16:04:47  Left anterior fascicular block now present  Atrial premature complex(es) no longer present  Left-axis deviation n o longer present  T-wave abnormality still present    Electronically Signed On 2018 0:20:25 PST by Red Ray MD     ISTAT ARTERIAL BLOOD GAS   Result Value Ref Range    Ph 7.465 7.400 - 7.500    Pco2 41.1 (H) 26.0 - 37.0 mmHg    Po2 67 64 - 87 mmHg    Tco2 31 20 - 33 mmol/L    S02 94 93 - 99 %    Hco3 29.6 (H) 17.0 - 25.0 mmol/L    BE 5 (H) -4 - 3 mmol/L    Body Temp see below degrees    O2 Therapy 30 %     iPF Ratio 223     Specimen Arterial     Action Range Triggered NO     Inst. Qualified Patient YES          RADIOLOGY  DX-ELBOW-COMPLETE 3+ RIGHT   Final Result      1.  No evidence of acute fracture or dislocation.      2.  Soft tissue swelling.      3.  Mild degenerative change.      4.  Joint effusion.      DX-CHEST-PORTABLE (1 VIEW)   Final Result      1.  Cardiomegaly with pulmonary edema.      2.  Large right pleural effusion with right lung base atelectasis.      CT-CTA CHEST PULMONARY ARTERY W/ RECONS    (Results Pending)       COURSE & MEDICAL DECISION MAKING    This is a 52 y.o. male who presents with worsening dyspnea, acute on chronic, multiple medical problems and recently admitted for multiple rib fractures after a fall off of an EMS gurney.    Differential Diagnosis includes but is not limited to: copd exacerbation, ACS, pneumonia, pneumothorax, sepsis.     ED Course:  10:43 PM The patient was seen and examined at bedside.  Sepsis protocol will be initiated as well as EKG, x-ray, and CT scan of his chest to evaluate for possible pulmonary embolism versus complicated pleural or parapneumonic effusion.  I will order a sepsis fluid bolus per protocol, and the patient needs to be kept n.p.o. in case there is an acute surgical process present.    EKG shows no obvious STEMI, however the patient's troponin is slightly elevated.  He does have a complicated coronary history as well as CHF, he has no active chest pain I will initially just give him aspirin and trend his troponins.  Highly doubt acute coronary syndrome and likely strain secondary to his other multiple medical issues at this time.    Chest x-ray with worsening right-sided pleural effusion.  Still plan CT of his chest.  His lactic acid level is normal, doubt severe sepsis or septic shock, given the risk for acute CHF in this patient I will reduce his bolus to a slow infusion of IV crystalloid fluids.  He does have an elevated white blood cell  count but no fevers.  He appears dehydrated on his labs but does not have severe anion gap acidosis.  His right elbow x-ray does not show any obvious acute fracture or dislocation.  Given my concern for possible pneumonia or parapneumonic effusion in his recent hospitalizations I discussed with her pharmacist and we both agree that vancomycin and cefepime appear appropriately indicated.  While he has anaphylaxis to penicillin, he has tolerated ceftriaxone in the past so I feel this is a safe broad-spectrum antibiotic regimen.    Unfortunately, the patient was unable to lay flat for CT scan.  Multiple attempts were made including treating with parenteral opioids for pain control but the patient was unable to tolerate this study.  His work of breathing appears to be increasing, given his pronounced decreased lung fields I think he would benefit from positive pressure ventilation since this would help both of his acute CHF or COPD exacerbation.  Respiratory therapy was kindly swiftly to the bedside to administer BiPAP.  The patient was initially hesitant but I with coaching encouraged him to trial positive pressure mask.    12:52 AM I consulted with the [facility] team, ICU, who will kindly admit the patient for further workup and treatment.  I discussed my plan for hopeful stabilization overnight with positive pressure ventilation, consideration of thoracentesis or tube thoracostomy, however I feel it would be safest if a CT scan could be obtained first.  This can hopefully be obtained with the patient's respiratory status is improved.    Pulmonary medicine consulted as well to help with management of patient.     Upon my evaluation, this patient had a high probability of imminent or life-threatening deterioration due to acute respiratory failure with hypoxia.     I personally provided 40 minutes of total critical care time outside of time spent on separately billable/documented procedures. This required my direct  attention, intervention, and management which included the following:  -review of laboratory data  -review of radiology studies  -discussion with consultants: Pharmacy, ICU admitting team, pulmonary  -Discussions with the patient  -monitoring for potential decompensation with serial bedside reevaluation  -Parenteral steroids, serial breathing treatments, initiation of positive pressure ventilation, broad-spectrum antibiotics      Medications   Respiratory Care per Protocol (not administered)   senna-docusate (PERICOLACE or SENOKOT S) 8.6-50 MG per tablet 2 Tab (2 Tabs Oral Refused 11/7/18 0600)     And   polyethylene glycol/lytes (MIRALAX) PACKET 1 Packet (not administered)     And   magnesium hydroxide (MILK OF MAGNESIA) suspension 30 mL (not administered)     And   bisacodyl (DULCOLAX) suppository 10 mg (not administered)   insulin lispro (HUMALOG) injection 1-6 Units (1 Units Subcutaneous Given 11/7/18 0540)     And   glucose 4 g chewable tablet 16 g (not administered)     And   dextrose 50% (D50W) injection 25 mL (not administered)   methylPREDNISolone (SOLU-MEDROL) 40 MG injection 40 mg (40 mg Intravenous Given 11/7/18 0500)   ipratropium-albuterol (DUONEB) nebulizer solution (3 mL Nebulization Given 11/7/18 0823)   albuterol inhaler 2 Puff (not administered)   amiodarone (CORDARONE) tablet 200 mg (200 mg Oral Refused 11/7/18 0600)   budesonide-formoterol (SYMBICORT) 160-4.5 MCG/ACT inhaler 2 Puff (0 Puffs Inhalation Held 11/7/18 0600)   calcium carbonate (TUMS) chewable tab 500 mg (not administered)   ferrous sulfate tablet 325 mg (325 mg Oral Refused 11/7/18 0730)   guaiFENesin LA (MUCINEX) tablet 600 mg (600 mg Oral Refused 11/7/18 0600)   lisinopril (PRINIVIL) tablet 2.5 mg (2.5 mg Oral Refused 11/7/18 0600)   metoprolol SR (TOPROL XL) tablet 25 mg (25 mg Oral Refused 11/7/18 0600)   oxyCODONE-acetaminophen (PERCOCET) 5-325 MG per tablet 1 Tab (not administered)   rosuvastatin (CRESTOR) tablet 40 mg (40 mg  Oral Refused 11/7/18 0600)   spironolactone (ALDACTONE) tablet 25 mg (25 mg Oral Refused 11/7/18 0600)   ascorbic acid tablet 500 mg (500 mg Oral Refused 11/7/18 0600)   furosemide (LASIX) injection 20 mg (20 mg Intravenous Given 11/7/18 1026)   cefepime (MAXIPIME) 2 g in  mL IVPB (not administered)   MD Alert...Vancomycin per Pharmacy (not administered)   SODIUM CHLORIDE 0.9 % IV SOLN (not administered)   SODIUM CHLORIDE 0.9 % IV SOLN (not administered)   methylPREDNISolone sod succ (SOLU-MEDROL) 125 MG injection 125 mg (125 mg Intravenous Given 11/6/18 2315)   ipratropium-albuterol (DUONEB) nebulizer solution (3 mL Nebulization Given 11/6/18 2305)   cefepime (MAXIPIME) 2 g in  mL IVPB (0 g Intravenous Stopped 11/7/18 0417)   vancomycin 3,000 mg in  mL IVPB (0 mg Intravenous Stopped 11/7/18 0355)   fentaNYL (SUBLIMAZE) injection 100 mcg (100 mcg Intravenous Given 11/7/18 0030)   prothrombin complex conc human (KCENTRA) 1000 units kit KIT 2,654 Units (2,654 Units Intravenous Given 11/7/18 0818)     And   phytonadione (AQUA-MEPHYTON) 10 mg in NS 50 mL IVPB (10 mg Intravenous New Bag 11/7/18 0852)       FINAL IMPRESSION  1. Acute respiratory failure with hypoxia (HCC)    2. Shortness of breath    3. History of coronary artery disease    4. Leukocytosis, unspecified type    5. Pleural effusion    6. Acute exacerbation of chronic obstructive pulmonary disease (COPD) (HCC)    7. Paroxysmal atrial fibrillation (HCC)    8. Acute on chronic congestive heart failure, unspecified heart failure type (HCC)    9. Elevated troponin        -ADMIT-     Pertinent Labs & Imaging studies reviewed and verified by myself, as well as nursing notes and the patient's past medical, family, and social histories (See chart for details).    Results, exam findings, clinical impression, presumed diagnosis, treatment options, and plan for admission were discussed with the patient, and they verbalized understanding, agreed with,  and appreciated the plan of care.    IJosiah (Scribe), am scribing for, and in the presence of, Red Sevilla M.D..    Electronically signed by: Josiah Ahuja (Fernando), 11/6/2018    Red LARA M.D. personally performed the services described in this documentation, as scribed by Josiah Ahuja in my presence, and it is both accurate and complete.    Portions of this record were made with voice recognition software.  Despite my review, spelling/grammar/context errors may still remain.  Interpretation of this chart should be taken in this context.    The note accurately reflects work and decisions made by me.  Red Sevilla  11/7/2018  12:52 AM

## 2018-11-07 NOTE — PROGRESS NOTES
"Pharmacy Kinetics 52 y.o. male on vancomycin day # 1 2018    Currently on Vancomycin 3000 mg iv x 1 loading dose given at 01:00    Indication for Treatment: Pneumonia    Pertinent history per medical record: Admitted on 2018 for SOB worsening x 2 days, productive cough of green sputum and is using 4 pillows for sleep whereas he usually only uses 3 pillows.  Patient was just discharged on 10/31/18 for COPD exacerbation.  During the hospitalization patient underwent a fall from the EMS gurney and suffered rib fractures from 5 through 8 resulting in an apical pneumothorax.  Patient has a swollen elbow from that.  Patient has had sick contacts at home (wife and grandson).  PMH: recently quit smoking (2018), obesity (BMI 40.9), COPC, CHF with preserved EF, A fib for which he is on Coumadin, T2DM and microcytic anemia.  Chest X-ray shows large right pleural effusion.  Empiric antibiotics initiated for Pneumonia.    Other antibiotics: Cefepime 2 g IV q8h    Allergies: Cillins [penicillins]; Erythromycin; Metoprolol; and Shellfish allergy     List concerns for renal function:  CHF, obesity (BMI ~ 39), contrast on 18    Pertinent cultures to date:   18 BC x 2  NGTD    Recent Labs      18   2257   WBC  20.3*   NEUTSPOLYS  85.20*     Recent Labs      18   2257   BUN  32*   CREATININE  1.28   ALBUMIN  3.3     No results for input(s): VANCOTROUGH, VANCOPEAK, VANCORANDOM in the last 72 hours.No intake or output data in the 24 hours ending 18 0434   Blood pressure 126/68, pulse 73, temperature 36 °C (96.8 °F), resp. rate (!) 22, height 1.956 m (6' 5.01\"), weight (!) 156.5 kg (345 lb 0.3 oz), SpO2 95 %. Temp (24hrs), Av.5 °C (97.7 °F), Min:36 °C (96.8 °F), Max:37 °C (98.6 °F)      A/P   1. Vancomycin dose change: new start, pulse dose  2. Next vancomycin level: 18-hour random level ordered  3. Goal trough: 12 - 16 mcg/mL  4. Comments: This patient has multiple risk factors for " Vancomycin accumulation and previous Vancomycin dosing also indicates patient will not clear Vancomycin well.  No scheduled dosing ordered at this time.  18-hour random level order to evaluate clearance.  Daytime clinical pharmacist to follow and make changes as clinical status dictates.  Pharmacy will continue to monitor and adjust or de-escalate as appropriate.        Rayne Hernandez [facility]

## 2018-11-07 NOTE — NON-PROVIDER
[facility] Progress Note      Admit Date: 11/6/2018  ICU Day: 2    Resident(s): Rayne Monaco  Attending: SHIVAM KAISER/     Date & Time:   11/7/2018   2:29 PM       Patient ID:    Name:             Joshua Medrano   YOB: 1965  Age:                 52 y.o.  male   MRN:               [id number]    HPI:  Mr. Peace is 52-year-old male who came to the hospital for complaints of worsening shortness of breath over the past couple days.  He has a prior medical history of COPD, HFpEF, CHF, paroxysmal atrial fibrillation, DM, CAD post CABG and dyslipidemia.  He was recently released from the hospital on 10/31/18 for COPD exacerbation and rib fractures with pneumothorax.    Consultants:  Surgery - Dr. Zavaleta  PMA: Dr. Peace    Interval Events:  Trial off BiPAP early this AM.  He was only able to tolerate being off for approximately 5 minutes before marked increase in work of breathing and desaturation.  Thoracentesis performed at bedside by Dr. Oconnor and Dr. Peace, sample drawn and sent to lab but unable to evacuate fluid.  Consult was placed to Dr. Zavaleta for placement of chest tube.  Chest tube was placed at bedside with large amount of bloody fluid returned on insertion and over 2L in pleuravac.  Post-chest tube placement patient experienced brief drop in systolic blood pressure to the 60's, desaturation in SpO2 and c/o difficulty breathing.  Fluid bolus initiated, blood pressure increased and fluids were stopped.  Throughout afternoon, respiratory status improved and transitioned from BiPAP to oxymask at 4 L.      Review of Systems   Constitutional: Negative for chills and fever.   HENT: Negative for congestion, ear pain, hearing loss, sinus pain and sore throat.    Eyes: Negative for blurred vision, double vision and discharge.   Respiratory: Positive for cough and shortness of breath.    Cardiovascular: Positive for chest pain, orthopnea and leg swelling. Negative for palpitations.         "R sided chest pain with movement, inspiration.  States from broken ribs   Gastrointestinal: Negative for abdominal pain, constipation, diarrhea, heartburn, nausea and vomiting.   Genitourinary: Negative for dysuria, frequency and urgency.   Musculoskeletal: Positive for joint pain.        R elbow pain   Skin:        C/o bruising   Neurological: Negative for dizziness, sensory change and headaches.   Psychiatric/Behavioral: Negative for depression and suicidal ideas.       PHYSICAL EXAM  Vitals:    11/07/18 0530 11/07/18 0600 11/07/18 0823 11/07/18 1051   BP:       Pulse: 70 74     Resp: 17 20     Temp:       SpO2: 94% 96% 93% 92%   Weight:       Height:         Body mass index is 40.9 kg/m².  /68   Pulse 74   Temp 36 °C (96.8 °F)   Resp 20   Ht 1.956 m (6' 5.01\")   Wt (!) 156.5 kg (345 lb 0.3 oz)   SpO2 92%   BMI 40.90 kg/m²   O2 therapy: Pulse Oximetry: 92 %, O2 (LPM): 3, O2 Delivery: BIPAP    Physical Exam   Constitutional: He appears distressed.   HENT:   Head: Normocephalic and atraumatic.   Right Ear: External ear normal.   Left Ear: External ear normal.   Dry mucous membranes   Eyes: Pupils are equal, round, and reactive to light. Conjunctivae are normal. No scleral icterus.   Neck: No tracheal deviation present.   Cardiovascular: Normal rate and regular rhythm.  Exam reveals no gallop and no friction rub.    Murmur heard.  Pulmonary/Chest: He is in respiratory distress. He has no wheezes.   Rapid respiratory rate, shallow breaths.  Breath sounds decreased on R>L.   Abdominal: Soft. Bowel sounds are normal. There is no tenderness. There is no rebound and no guarding.   obese   Genitourinary: No discharge found.   Genitourinary Comments: Urinary catheter in place   Musculoskeletal: He exhibits edema.   R elbow bruised and swollen.  Fluctuance present.  ROM limited due to pain in R elbow and shoulder.     Neurological: He is alert. No cranial nerve deficit. GCS score is 15.   Skin: Skin is warm and " dry. There is erythema.   Erythema and swelling to BLE   Psychiatric: Affect normal.   Some anxiety       Respiratory:     Respiration: 20, Pulse Oximetry: 92 %, O2 Daily Delivery Respiratory : Silicone Nasal Cannula  Pt on BiPAP in morning, transitioned to oxymask in the afternoon.    Chest Tube Drains:    Recent Labs      11/07/18   0244   ISTATAPH  7.465   ISTATAPCO2  41.1*   ISTATAPO2  67   ISTATATCO2  31   [id number]  94   ISTATARTHCO3  29.6*   ISTATARTBE  5*   ISTATTEMP  see below   ISTATFIO2  30   ISTATSPEC  Arterial       HemoDynamics:  Pulse: 74, Heart Rate (Monitored): 76 Blood Pressure: 126/68, NIBP: 108/56      Neuro:      Fluids:    Date 11/07/18 0700 - 11/08/18 0659   Shift 5071-2593 4668-4523 5337-4233 24 Hour Total   I  N  T  A  K  E   Shift Total       O  U  T  P  U  T   Urine 925   925      Output (mL) (Urethral Catheter Temperature probe 16 Fr.) 925   925    Chest Tube 5520   5520      Output (mL) (Chest Tube Right Pleural) 5520   5520    Shift Total 6445   6445   NET -6445   -6445        Intake/Output Summary (Last 24 hours) at 11/07/18 1429  Last data filed at 11/07/18 1400   Gross per 24 hour   Intake            500.1 ml   Output             7795 ml   Net          -7294.9 ml       Weight: (!) 156.5 kg (345 lb 0.3 oz)  Body mass index is 40.9 kg/m².    Recent Labs      11/06/18 2257 11/07/18   1213   SODIUM  134*  135   POTASSIUM  4.7  4.8   CHLORIDE  99  100   CO2  29  26   BUN  32*  32*   CREATININE  1.28  1.41*   MAGNESIUM   --   2.1   CALCIUM  9.2  9.4       GI/Nutrition:  Recent Labs      11/06/18 2257 11/07/18   1213   ALTSGPT  23  22   ASTSGOT  25  21   ALKPHOSPHAT  118*  118*   TBILIRUBIN  0.7  0.9   AMYLASE   --   27   GLUCOSE  153*  176*       Heme:  Recent Labs      11/06/18 2257 11/07/18   0800  11/07/18   1213   RBC  4.67*   --   4.89   HEMOGLOBIN  8.6*   --   9.2*   HEMATOCRIT  33.4*   --   34.6*   PLATELETCT  290   --   322   PROTHROMBTM  38.0*  36.9*   --    APTT  57.9*    --    --    INR  3.88*  3.73*   --        Infectious Disease:  Monitored Temp 2  Av.3 °C (97.4 °F)  Min: 35.4 °C (95.7 °F)  Max: 36.8 °C (98.2 °F)  Temp  Av.5 °C (97.7 °F)  Min: 36 °C (96.8 °F)  Max: 37 °C (98.6 °F)  Recent Labs      18   2257  18   0934  18   1213   WBC  20.3*   --   22.0*   NEUTSPOLYS  85.20*   --   92.00*   LYMPHOCYTES  6.10*   --   1.60*   MONOCYTES  6.10   --   1.40   EOSINOPHILS  1.70  2  0.20   BASOPHILS  0.00   --   0.20   ASTSGOT  25   --   21   ALTSGPT  23   --   22   ALKPHOSPHAT  118*   --   118*   TBILIRUBIN  0.7   --   0.9       Meds:  • Respiratory Care per Protocol       • senna-docusate  2 Tab      And   • polyethylene glycol/lytes  1 Packet      And   • magnesium hydroxide  30 mL      And   • bisacodyl  10 mg     • insulin lispro  1-6 Units      And   • glucose 4 g  16 g      And   • dextrose 50%  25 mL     • ipratropium-albuterol  3 mL     • albuterol  2 Puff     • amiodarone  200 mg     • budesonide-formoterol  2 Puff     • calcium carbonate  500 mg     • ferrous sulfate  325 mg     • guaiFENesin LA  600 mg     • lisinopril  2.5 mg     • metoprolol SR  25 mg     • oxyCODONE-acetaminophen  1 Tab     • rosuvastatin  40 mg     • spironolactone  25 mg     • ascorbic acid  500 mg     • cefepime  2 g Stopped (18 1247)   • MD Alert...Vancomycin per Pharmacy       • NS       • NS       • norepinephrine   Stopped (18 1100)   • NS       • NS       • morphine (pf) 4 mg/ml            Procedures:      Imaging:  DX-CHEST-PORTABLE (1 VIEW)   Final Result         1. Tiny right apical pneumothorax status post thoracentesis.   2. Significant decrease in right effusion, with reexpansion of the right lung.   3. Diffuse interstitial edema. Small left effusion.      DX-ELBOW-COMPLETE 3+ RIGHT   Final Result      1.  No evidence of acute fracture or dislocation.      2.  Soft tissue swelling.      3.  Mild degenerative change.      4.  Joint effusion.       DX-CHEST-PORTABLE (1 VIEW)   Final Result      1.  Cardiomegaly with pulmonary edema.      2.  Large right pleural effusion with right lung base atelectasis.      CT-CTA CHEST PULMONARY ARTERY W/ RECONS    (Results Pending)   DX-CHEST-LIMITED (1 VIEW)    (Results Pending)       Problem and Plan:    Acute on chronic respiratory failure  Differentials include:  Pleural effusion, COPD exacerbation, CHF exacerbation  BiPAP as needed, respiratory protocol  Improved post chest tube placement  Discontinue steroids    Pleural effusion - acute  Large R pleural effusion.    Thoracentesis done, sample obtained.  Chest tube placed for drainage - approx 2.5 L obtained.  LDH elevated - exudative  Differentials include:  Infection, hemothorax    COPD - chronic  Pleural effusion appears to be cause of respiratory distress  Wears 2-3 L O2 at night baseline  No hypercarbia on blood gas  Respiratory protocol  Continue symbicort, albuterol    Microcytic anemia - chronic  Hemoglobin up from last admission  Previous low iron levels  Iron sulfate at home, continue in hospital  Ascorbic acid added for absorption    Congestive heart failure - chronic  Pleural effusion appears to main source of respiratory distress.  Persistent interstitial edema present on CXR  Continue furosemide    Elevated troponin - acute  Elevated on admission, could be related to strain  No c/o chest pain  Last troponin decreased, continue to trend    Paroxysmal atrial fibrillation - chronic  Currently sinus rhythm  Continue metoprolol, anticoagulation  Warfarin dosing per pharmacy, supratherapeutic on admission.    Leukocytosis - chronic  Continued leukocytosis on admission  Steroids received during prior hospitalization, 1 wk ago  Received steroids on admission  Antibiotics started  Awaiting flow cytometry    CAD - chronic  Post CABG  No chest pain  Continue home meds    Assessment & plan notes cannot be loaded without a specified hospital service.      DISPO:   Continue admission for respiratory distress, chest tube     CODE STATUS: Full    Quality Measures:  Ceballos Catheter: yes  DVT Prophylaxis: anticoagulation with warfarin  Ulcer Prophylaxis: no  Antibiotics: yes  Lines: PIV

## 2018-11-07 NOTE — ED NOTES
Pt returned from Ct with RN, pt unable to complete Ct scan due to SOB and extreme pain. ERP notified of pt's worsening SOB and oxygen sats lowering, pt stating he is becoming more fatigued from breathing.

## 2018-11-07 NOTE — ASSESSMENT & PLAN NOTE
Patient with more right than left heart failure at this time, aggressive diuresis not necessary  Forced diuresis as needed - Spironolactone/furosemide  Afterload reduction as needed, goal -140, ACE inhibitor    Pt likely has large element cor pulmonale. He may be near end-stage

## 2018-11-07 NOTE — ED NOTES
PT to CT with RN, Pt unable to tolerate lying flat for scan due to pain, ERP contacted and orders received to give pt fentanyl for pain to assist with obtaining scan.

## 2018-11-07 NOTE — NON-PROVIDER
"[facility] ICU Progress Note      Admit Date: 11/6/2018  ICU Day: 1    Resident(s): Rayne Monaco  Attending: SHIVAM KAISER/ Dr. Peace  Date & Time:   11/7/2018   11:42 AM       Patient ID:    Name:             Joshua Medrano   YOB: 1965  Age:                 52 y.o.  male   MRN:               [id number]    HPI:  Mr. Peace is 52-year-old male who came to the hospital for complaints of worsening shortness of breath over the past couple days.  He has a prior medical history of COPD, HFpEF, CHF, paroxysmal atrial fibrillation, DM, CAD post CABG and dyslipidemia.  He was recently released from the hospital on 10/31/18 where he was treated for pneumonia and sustained a fall which resulted in R rib fractures and L elbow injury.    Consultants:  Surgery - Dr. Zavaleta  PMA: Dr. Peace    Interval Events:  Trial off BiPAP early this AM.  He was only able to tolerate being off for approximately 5 minutes before marked increase in work of breathing and desaturation.  Thoracentesis performed at bedside by Dr. Oconnor and Dr. Peace, sample drawn and sent to lab but unable to evacuate fluid.  Consult was placed to Dr. Zavaleta for placement of chest tube.  Chest tube was placed at bedside with large amount of bloody fluid returned on insertion and over 2L in pleuravac.  Post placement patient experienced drop in systolic blood pressure to the 60's, desaturation in SpO2 and c/o difficulty breathing.  Fluid bolus initiated and blood pressure increased.  Fluids were stopped.  Vasopressors not started at this time.   ROS    PHYSICAL EXAM***  Vitals:    11/07/18 0530 11/07/18 0600 11/07/18 0823 11/07/18 1051   BP:       Pulse: 70 74     Resp: 17 20     Temp:       SpO2: 94% 96% 93% 92%   Weight:       Height:         Body mass index is 40.9 kg/m².  /68   Pulse 74   Temp 36 °C (96.8 °F)   Resp 20   Ht 1.956 m (6' 5.01\")   Wt (!) 156.5 kg (345 lb 0.3 oz)   SpO2 92%   BMI 40.90 kg/m²   O2 therapy: Pulse " Oximetry: 92 %, O2 (LPM): 3, O2 Delivery: BIPAP    Physical Exam    Respiratory:     Respiration: 20, Pulse Oximetry: 92 %, O2 Daily Delivery Respiratory : Silicone Nasal Cannula    Chest Tube Drains:    Recent Labs      18   0244   ISTATAPH  7.465   ISTATAPCO2  41.1*   ISTATAPO2  67   ISTATATCO2  31   [id number]  94   ISTATARTHCO3  29.6*   ISTATARTBE  5*   ISTATTEMP  see below   ISTATFIO2  30   ISTATSPEC  Arterial       HemoDynamics:  Pulse: 74, Heart Rate (Monitored): 76 Blood Pressure: 126/68, NIBP: 108/56      Neuro:      Fluids:    Date 18 07 - 18 0659   Shift 7350-2181 0385-4915 9830-7472 24 Hour Total   I  N  T  A  K  E   Shift Total       O  U  T  P  U  T   Urine 565   565      Output (mL) (Urethral Catheter Temperature probe 16 Fr.) 565   565    Shift Total 565   565   NET -565   -565        Intake/Output Summary (Last 24 hours) at 18 1142  Last data filed at 18 1000   Gross per 24 hour   Intake            500.1 ml   Output             1915 ml   Net          -1414.9 ml       Weight: (!) 156.5 kg (345 lb 0.3 oz)  Body mass index is 40.9 kg/m².    Recent Labs      18   SODIUM  134*   POTASSIUM  4.7   CHLORIDE  99   CO2  29   BUN  32*   CREATININE  1.28   CALCIUM  9.2       GI/Nutrition:  Recent Labs      18   ALTSGPT  23   ASTSGOT  25   ALKPHOSPHAT  118*   TBILIRUBIN  0.7   GLUCOSE  153*       Heme:  Recent Labs      18   0800   RBC  4.67*   --    HEMOGLOBIN  8.6*   --    HEMATOCRIT  33.4*   --    PLATELETCT  290   --    PROTHROMBTM  38.0*  36.9*   APTT  57.9*   --    INR  3.88*  3.73*       Infectious Disease:  Monitored Temp 2  Av.3 °C (97.4 °F)  Min: 35.4 °C (95.7 °F)  Max: 36.8 °C (98.2 °F)  Temp  Av.5 °C (97.7 °F)  Min: 36 °C (96.8 °F)  Max: 37 °C (98.6 °F)  Recent Labs      18   2257   WBC  20.3*   NEUTSPOLYS  85.20*   LYMPHOCYTES  6.10*   MONOCYTES  6.10   EOSINOPHILS  1.70   BASOPHILS  0.00   ASTSGOT  25    ALTSGPT  23   ALKPHOSPHAT  118*   TBILIRUBIN  0.7       Meds:  • NS       • norepinephrine       • NS       • NS       • morphine (pf) 4 mg/ml       • Respiratory Care per Protocol       • senna-docusate  2 Tab      And   • polyethylene glycol/lytes  1 Packet      And   • magnesium hydroxide  30 mL      And   • bisacodyl  10 mg     • insulin lispro  1-6 Units      And   • glucose 4 g  16 g      And   • dextrose 50%  25 mL     • methylPREDNISolone  40 mg     • ipratropium-albuterol  3 mL     • albuterol  2 Puff     • amiodarone  200 mg     • budesonide-formoterol  2 Puff     • calcium carbonate  500 mg     • ferrous sulfate  325 mg     • guaiFENesin LA  600 mg     • lisinopril  2.5 mg     • metoprolol SR  25 mg     • oxyCODONE-acetaminophen  1 Tab     • rosuvastatin  40 mg     • spironolactone  25 mg     • ascorbic acid  500 mg     • cefepime  2 g     • MD Alert...Vancomycin per Pharmacy       • NS            Procedures:  ***    Imaging:  DX-ELBOW-COMPLETE 3+ RIGHT   Final Result      1.  No evidence of acute fracture or dislocation.      2.  Soft tissue swelling.      3.  Mild degenerative change.      4.  Joint effusion.      DX-CHEST-PORTABLE (1 VIEW)   Final Result      1.  Cardiomegaly with pulmonary edema.      2.  Large right pleural effusion with right lung base atelectasis.      CT-CTA CHEST PULMONARY ARTERY W/ RECONS    (Results Pending)   DX-CHEST-LIMITED (1 VIEW)    (Results Pending)   DX-CHEST-PORTABLE (1 VIEW)    (Results Pending)       Problem and Plan:***      Assessment & plan notes cannot be loaded without a specified hospital service.      DISPO: ***    CODE STATUS: ***    Quality Measures:  Ceballos Catheter: ***  DVT Prophylaxis: ***  Ulcer Prophylaxis: ***  Antibiotics: ***  Lines: ***

## 2018-11-07 NOTE — ED TRIAGE NOTES
Chief Complaint   Patient presents with   • Shortness of Breath     Pt reports shortness of breath began this evening when he layed down.  Per ems, pt had room air sat of 86% and wheezes throughout.  pta pt received 2 albuterol treatments and 1 douneb treatment.  Pt has recent history of pneumonia and multiple broken ribs on right chest.  Pt here for above complaint.  Connected to cardiac monitor, BP cuff, and continuous pulse ox upon arrival.  Pt in gown, call light in reach, bed in lowest position.  Chart up for ERP.

## 2018-11-07 NOTE — ED NOTES
PIV initiated.  Blood drawn (rainbow and one set of cultures) and sent to lab.  Lab called to draw cod and second set of blood cultures.  RT called for breathing treatments.  Report to Angel COPELAND.

## 2018-11-07 NOTE — PROGRESS NOTES
2 RN skin assessment completed with MIN aaron. No abrasions noted on head or face, multiple bruises noted on pt upper extremities, skin tear on right elbow with inflammation from previous fall, IV infiltration site noted on LUE, abdomen red, swollen, no open wounds noted, painful to touch per patient. Minor breakdown noted under breast folds, cleansed WONG, as well as under stomach. Significant bruising noted on right side pt states from previous fall [facility] [employer]. Redness and tenderness noted on thighs, BLE red, swollen, shiny warm/cool to touch, feet are calloused, fragile, flaky,heels are pink/red and blanching. Breakdown noted under buttocks/sacral area, mepilex applied.     Pictures take of all aforementioned wounds, placed in EPIC, wound consult placed.     Attempted to place Mepitel light on pt bridge of nose to prevent skin breakdown from BiPap, however pt refused at this time. Education provided, will continue to to reinforce.     Waffle cushion in place, pillows in use for support and positioning, Q2H turns in place while on bedrest.

## 2018-11-07 NOTE — ASSESSMENT & PLAN NOTE
BiPAP  RT protocols  IV steroids  Nebulized treatments with DuoNeb every 4 and every 2 as needed  Mucolytic's  Monitor for the need for intubation and mechanical ventilation    Will use night-time bipap  Outpt sleep study  Diresees  Short course steroids    Pt declining BiPap- warrants further discussion and sleep study as previously noted

## 2018-11-07 NOTE — PROGRESS NOTES
Inpatient Anticoagulation Service Note    Date: 11/7/2018  Reason for Anticoagulation: Atrial Fibrillation   [id number]    Hemoglobin Value: (!) 8.6  Hematocrit Value: (!) 33.4  Lab Platelet Value: 290  Target INR: 2.0 to 3.0    INR from last 7 days     Date/Time INR Value    11/06/18 2257 (!)  3.88        Dose from last 7 days     Date/Time Dose (mg)             Significant Interactions: Antibiotics, Corticosteroids  Bridge Therapy: No    Reversal Agent Administered: Not Applicable    Comments: Continuation of home regimen.  Per last hospital discharge summary, home dose is 5 mg po daily, INR supratherapeutic upon admission     Plan:  Hold dose on 11/6, AM Coags    Education Material Provided?: No  Pharmacist suggested discharge dosing: To be determined       Josiah Abraham, PharmD

## 2018-11-07 NOTE — WOUND TEAM
"This RN up to see patient in T628/00. Pt agreeable to assessment. Per pt \"I don't have any wounds they are getting ortho in here for this\" pt pointed to right arm which appears to have large fluid collection to the proximal forearm. This RN explained that I would like to cleanse his feet and ensure there are no wounds to his feet. Pt agreeable and requesting XXL socks. Dr. Zavaleta at this time entered room to discuss chest tube placement. Dr. Zavaleta will be placing chest tube at bedside, this RN stepped out and ordered XXL non-skid slippers. During brief assessment this RN was able to visualize the right flank/lateral chest. Pt has a large bruise to the area. Pt sustained a fall to that side. Pts right proximal FA has scattered abrasions overlying the fluid collection. BLE appear to be red with some hemosiderin staining. No wounds or pressure injuries identified. This RN will return this afternoon for full assessment and cleansing of bilateral plantar feet.   "

## 2018-11-07 NOTE — ASSESSMENT & PLAN NOTE
Transfuse to hemoglobin greater than 7.0  Serial CBC  Stool occult studies  GI eval if indicated  Given significant comorbidities with cardiopulmonary issues the patient is not a very good candidate for therapy or diagnostics at this time    Track serial H/H

## 2018-11-07 NOTE — ASSESSMENT & PLAN NOTE
BiPAP therapy -much improved work of breathing after initiating therapy in the ER and continuing it in the ICU  Treatment for pneumonia and COPD exacerbation and to a lesser degree congestive heart failure   Right-sided pleural effusion also contributing significantly and patient needs thoracentesis after CT chest  RT protocols  Monitor for the need for intubation and mechanical ventilation  If intubated diagnostic and therapeutic bronchoscopy will be necessary and appropriate    I believe that the large pleural effusion was the main  of this episode of acute and severe SOB. Greatly improved with drainage of effusion this afternoon    Greatly improved today. I think that the best way to prevent the effusion from re-accumulating will be to keep pt dry. Also would give course of empiric antibiotics for possible HCAP alhtough I do think pt has pneumonia

## 2018-11-07 NOTE — ASSESSMENT & PLAN NOTE
Patient has a microcytic anemia, MCV is 71.5.  This is due to iron deficiency, red cell distribution width is elevated and last iron panel consistent with iron deficiency.  Patient has been on ferrous sulfate as outpatient and has showed some slight improvement in his hemoglobin.

## 2018-11-07 NOTE — ASSESSMENT & PLAN NOTE
Recent fall off [facility] by report with rib 5 through 8 fractures noted radiographically and initial x-ray complicated by small pneumothorax which resolved with conservative therapy  Patient also injured right elbow and right hand  Clinically improved per patient with decreased pain and no pneumothorax seen on chest x-ray  Had been followed by surgery, reconsult if needed, does not appear to be necessary at this time    Has blood fluid in chest likely related to rib fx a weeks ago, Now has chest tube, bleeding appears to have abated    Fluid was bloody but thin, has exudative effusion, will treat as possible pneumonia although CT of chest not highly suggestive of pneumonia

## 2018-11-07 NOTE — ASSESSMENT & PLAN NOTE
Continue positive airway pressure with sleep both day and night  Weight loss to be encouraged  Patient can use home mask and equipment when clinically appropriate, requiring our BiPAP unit at this time    May not accept BIPap, discussed and encourage pt to use at night pending sleep study

## 2018-11-07 NOTE — PROGRESS NOTES
[facility] Progress Note      Admit Date: 11/6/2018  ICU Day:  1    Resident(s): Obey Oconnor  Attending: SHIVAM [facility]IS[facility]/ Dr. Peace    Date & Time:   11/7/2018   12:01 PM       Patient ID:    Name:             Joshua Medrano   YOB: 1965  Age:                 52 y.o.  male   MRN:               [id number]    HPI:  52 y.o. Male with Hx of copD (2 L at home), CHF (EF-55%), parox-A-fib, CABG, DLD, and DM-2.  He presented for worsening SOB x 2 days, then respiratory failure, and admission to ICU.  ...  Also with recent past hospital discharge (10/31/18), for Resp.failure, from COPD exacerbation.  On that visit, had fall from [facility], with rib fractures (right 5-8) and right elbow swelling.      Interval Events:  Worsened SOB, oxygen/BIPAP requirements.    CXR with right effusion.  -performed thoracentesis  (right side)  ... 11/7/2018   -surgery placed chest tube (right side) ... 11/7/2018   -transient decrease in BP, after chest tube, and quick loss of effusion (filling 2 L bag quickly).   -then improved / stable BP.  -then improved breathing, and clear speech (better than before).       Consultants:  ICU:  Dr. Eller,  Dr. Peace  Surgery:  Dr. Zavaleta      Review of Systems   Constitutional: Positive for fever. Negative for diaphoresis.   HENT: Negative for ear pain.    Eyes: Negative for blurred vision and pain.   Respiratory: Positive for sputum production and shortness of breath. Negative for hemoptysis and stridor.    Cardiovascular: Positive for orthopnea and leg swelling (chronic, denies recent change). Negative for chest pain and palpitations.   Gastrointestinal: Negative for abdominal pain, constipation and diarrhea.   Genitourinary: Negative for flank pain and hematuria.   Musculoskeletal: Positive for back pain (taiwo.near rib fractures, on right mid-lower ribs) and falls (see HPI / prior transport).   Skin:        Hemosiderin staining of lower legs, with dry feet.    Neurological:  "Positive for weakness. Negative for tremors, speech change and focal weakness.   Endo/Heme/Allergies: Bruises/bleeds easily (was on warfarin, with fall/ rib fxr).   Psychiatric/Behavioral: Negative for substance abuse. The patient is not nervous/anxious.        PHYSICAL EXAM    Vitals:    11/07/18 0530 11/07/18 0600 11/07/18 0823 11/07/18 1051   BP:       Pulse: 70 74     Resp: 17 20     Temp:       SpO2: 94% 96% 93% 92%   Weight:       Height:         Body mass index is 40.9 kg/m².  /68   Pulse 74   Temp 36 °C (96.8 °F)   Resp 20   Ht 1.956 m (6' 5.01\")   Wt (!) 156.5 kg (345 lb 0.3 oz)   SpO2 92%   BMI 40.90 kg/m²   O2 therapy: Pulse Oximetry: 92 %, O2 (LPM): 3, O2 Delivery: BIPAP    Physical Exam   Constitutional: He is oriented to person, place, and time. He appears distressed.   Obese male, with difficulty breathing, on BIPAP, and mild distress (in morning). (after procedure, was breathing much easier)   HENT:   Head: Normocephalic and atraumatic.   Eyes: Pupils are equal, round, and reactive to light. EOM are normal.   Neck: No tracheal deviation present.   Difficult to evaluate, due to size   Cardiovascular: Normal rate and regular rhythm.    Pulmonary/Chest: No stridor. He is in respiratory distress (mild distress in morning). He has rales. He exhibits tenderness (hx of fractured ribs).   After procedures / chest tube, he began breathing much easier, and was able to go from BIPAP, to nasal cannula.    Abdominal: Soft. There is no tenderness. There is no rebound and no guarding.   Obese, but soft   Musculoskeletal:   Large, obese legs, bilaterally.  Trace edema, but not significant or clearly pitting.     Neurological: He is alert and oriented to person, place, and time.   Skin: Skin is warm and dry. He is not diaphoretic.   With hemosiderin staining, bilaterally...  (pt notes unchanged for years)   Psychiatric: Mood and memory normal.       Respiratory:     Respiration: 20, Pulse Oximetry: 92 %, " O2 Daily Delivery Respiratory : Silicone Nasal Cannula    Chest Tube Drains:    Recent Labs      18   0244   ISTATAPH  7.465   ISTATAPCO2  41.1*   ISTATAPO2  67   ISTATATCO2  31   [id number]  94   ISTATARTHCO3  29.6*   ISTATARTBE  5*   ISTATTEMP  see below   ISTATFIO2  30   ISTATSPEC  Arterial       HemoDynamics:  Pulse: 74, Heart Rate (Monitored): 76 Blood Pressure: 126/68, NIBP: 108/56      Neuro:  Alert and oriented.     Fluids:    Date 18 07 - 18 0659   Shift 3205-9735 1025-5245 1563-5916 24 Hour Total   I  N  T  A  K  E   Shift Total       O  U  T  P  U  T   Urine 565   565      Output (mL) (Urethral Catheter Temperature probe 16 Fr.) 565   565    Shift Total 565   565   NET -565   -565          Intake/Output Summary (Last 24 hours) at 18 1754  Last data filed at 18 1600   Gross per 24 hour   Intake            750.1 ml   Output             5190 ml   Net          -4439.9 ml       Weight: (!) 156.5 kg (345 lb 0.3 oz)  Body mass index is 40.9 kg/m².    Recent Labs      18   SODIUM  134*   POTASSIUM  4.7   CHLORIDE  99   CO2  29   BUN  32*   CREATININE  1.28   CALCIUM  9.2       GI/Nutrition:  Recent Labs      18   ALTSGPT  23   ASTSGOT  25   ALKPHOSPHAT  118*   TBILIRUBIN  0.7   GLUCOSE  153*       Heme:  Recent Labs      18   0800   RBC  4.67*   --    HEMOGLOBIN  8.6*   --    HEMATOCRIT  33.4*   --    PLATELETCT  290   --    PROTHROMBTM  38.0*  36.9*   APTT  57.9*   --    INR  3.88*  3.73*       Infectious Disease:  Monitored Temp 2  Av.3 °C (97.4 °F)  Min: 35.4 °C (95.7 °F)  Max: 36.8 °C (98.2 °F)  Temp  Av.5 °C (97.7 °F)  Min: 36 °C (96.8 °F)  Max: 37 °C (98.6 °F)  Recent Labs      18   2257   WBC  20.3*   NEUTSPOLYS  85.20*   LYMPHOCYTES  6.10*   MONOCYTES  6.10   EOSINOPHILS  1.70   BASOPHILS  0.00   ASTSGOT  25   ALTSGPT  23   ALKPHOSPHAT  118*   TBILIRUBIN  0.7       Meds:  • Respiratory Care per Protocol        • senna-docusate  2 Tab      And   • polyethylene glycol/lytes  1 Packet      And   • magnesium hydroxide  30 mL      And   • bisacodyl  10 mg     • insulin lispro  1-6 Units      And   • glucose 4 g  16 g      And   • dextrose 50%  25 mL     • ipratropium-albuterol  3 mL     • albuterol  2 Puff     • amiodarone  200 mg     • budesonide-formoterol  2 Puff     • calcium carbonate  500 mg     • ferrous sulfate  325 mg     • guaiFENesin LA  600 mg     • lisinopril  2.5 mg     • metoprolol SR  25 mg     • oxyCODONE-acetaminophen  1 Tab     • rosuvastatin  40 mg     • spironolactone  25 mg     • ascorbic acid  500 mg     • cefepime  2 g     • MD Alert...Vancomycin per Pharmacy       • NS       • NS       • norepinephrine       • NS       • NS       • morphine (pf) 4 mg/ml            Procedures:  Thoracentesis, right - 11/7/2018   Chest tube, right (surgery) - 11/7/2018     Imaging:  DX-ELBOW-COMPLETE 3+ RIGHT   Final Result      1.  No evidence of acute fracture or dislocation.      2.  Soft tissue swelling.      3.  Mild degenerative change.      4.  Joint effusion.      DX-CHEST-PORTABLE (1 VIEW)   Final Result      1.  Cardiomegaly with pulmonary edema.      2.  Large right pleural effusion with right lung base atelectasis.      CT-CTA CHEST PULMONARY ARTERY W/ RECONS    (Results Pending)   DX-CHEST-LIMITED (1 VIEW)    (Results Pending)   DX-CHEST-PORTABLE (1 VIEW)    (Results Pending)       Problem and Plan:    * Acute on chronic respiratory failure (HCC)- (present on admission)   Assessment & Plan    -Continued difficulty / resp.failure, while on BIPAP.   -CXR with pleural effusion on right.   -Thoracentesis (dx / low-volume) done 11/7/2018 with bloody effusion.   -chest tube placed by surgery - 11/7/2018   -Improved breathing, after large volume taken off of effusion.   -Able to transition to oxygen / nasal cannula  -can use BIPAP, at night, (if tolerated by patient).     - due to large fluid loss, stopped lasix  (for now).   - RT - duonebs, and scheduled symbicort  - continued on his ACE inhibitor, beta-blocker, Aldactone, and Crestor.  -RT protocol implemented, scheduled DuoNeb treatments  -stopped solumedrol.  Changed to prednisone 40, PO, daily, x 5 d.  - Blood cultures and urine cultures ordered.    - Patient received vancomycin and cefepime for possible concern of pneumonia.  Will continue for now.        Chronic obstructive pulmonary disease with acute exacerbation (HCC)- (present on admission)   Assessment & Plan    Please refer to acute on chronic respiratory failure assessment and plan     Heart failure with preserved ejection fraction, borderline, class IV (HCC)- (present on admission)   Assessment & Plan    Please refer to acute on chronic respiratory failure assessment and plan  -getting new echo for comparison, and change in status.   (last echo with good EF, but change in status).      Paroxysmal atrial fibrillation (HCC)- (present on admission)   Assessment & Plan    Patient on warfarin as outpatient basis.  Held for now, given his recent procedures.  -Fecal occult stool sample ordered        Elevated troponin   Assessment & Plan    Patient has not troponin level of 0.14.  Patient had a history of slightly elevated troponin levels in the past.  This could be due to his CHF.  EKG did not reveal any ST segment changes and patient is not complaining of any chest pain or chest discomfort.  -Trend troponins (mild improvement since procedures, will repeat).      Microcytic anemia- (present on admission)   Assessment & Plan    Patient has a microcytic anemia, MCV is 71.5.  This is due to iron deficiency, red cell distribution width is elevated and last iron panel consistent with iron deficiency.  Patient has been on ferrous sulfate as outpatient and has showed some slight improvement in his hemoglobin.  Plan  -Once infection has been ruled out day team to consider IV infusion of iron  - Patient's home dose of  ferrous sulfate continued, vitamin C added for possible absorption issues  - - Currently with improving Hgb level.   - - CTM.      Leukocytosis- (present on admission)   Assessment & Plan    Patient has a leukocytosis, this appears to be a chronic issue dating back several months.  -Flow cytometry  (from admission)  - uncertain if on recurrent steroids.       Also with sudden increase in Cr from 1.28, to 1.41 in few hours.   From 1.28 to 1.41 in few hours.   Not quite at cut-off for LINSEY.   Patient now eating/drinking well.   Will hold off on additional fluids, given his effusion issues.   Will monitor for now.       DISPO:  ICU for close monitoring of respiratory status (BIPAP if needed).     CODE STATUS: fadia    Quality Measures:  Ceballos Catheter:   Yes  (close ICU I/o monitoring)   DVT Prophylaxis:   Hold heparin and coumadin, due to procedures.      On scd  Ulcer Prophylaxis:   no  Antibiotics:   Yes - possible pneumonia  Lines:   Peripheral         A computerized dictation system may have been used for this note.    Despite review, there may be some spelling or grammatical errors.    Obey Oconnor M.D.  11/7/2018   Attending:  Cesar Peace M.D.

## 2018-11-07 NOTE — PROCEDURES
Thoracentesis  Date/Time: 11/7/2018 9:46 AM  Performed by: WESLEY COLE  Authorized by: WESLEY COLE     Anesthesia (see MAR for exact dosages):     Anesthesia method:  Local infiltration    Local anesthetic:  Lidocaine 1% w/o epi  Procedure details:     Preparation: Patient was prepped and draped in usual sterile fashion      Patient position:  Sitting    Location:  R midscapular line    Intercostal space:  6th    Puncture method:  Over-the-needle catheter    Ultrasound guidance: yes      Indwelling catheter placed: no      Catheter size:  18 G    Number of attempts:  1    Drainage characteristics:  Bloody  Post-procedure details:     Chest x-ray performed: yes      Chest x-ray findings:  Pleural effusion unchanged    Patient tolerance of procedure:  Tolerated well, no immediate complications  Comments:      Old dark non-clotting blood drawn from catheter- 25 cc without difficulty. Then small catheter clotted off. IR/trauma called for definitive drainage. Pt tolerated procedure well

## 2018-11-07 NOTE — ASSESSMENT & PLAN NOTE
New right-sided opacities consistent with pneumonia with probable secondary pleural effusion  Hemoglobin stable, doubt hemothorax from recent rib fracture but eval appropriate  CT scan chest pending, was not performed earlier due to respiratory issues, going this a.m. to the scanner  May need thoracentesis  INR 3.73, holding warfarin, may need FFP/vitamin K for reversal for safe invasive procedure and right chest  Pan culture sent in ER  If fails from a respiratory standpoint and requires intubation patient should receive a diagnostic and therapeutic bronchoscopy with probable right lower lobe BAL, CT and initial bronchoscopic findings can be used to further guide diagnostic and therapeutic approach  Intravenous antibiotics to cover healthcare associated organisms, de-escalate rapidly as clinically appropriate, plan probable 7-day course given severity of his current state and multiple comorbidities  Mobilize/aggressive pulmonary toilet as respiratory status and hemodynamics allow  RT protocols and every 4 nebulizer treatments, CPT to right base if chest discomfort from recent rib fractures will allow    CT of chest pending, will consider bronch for tomorrow

## 2018-11-07 NOTE — ASSESSMENT & PLAN NOTE
History of coronary artery disease and prior coronary artery bypass  Cardiac monitoring  Echocardiogram  Serial troponin I  Cardiac consultation as needed  Fully anticoagulated with warfarin, was not on aspirin  Statin    Unclear if cardiac issues are greatly contributing but pt is globally fluid overloaded  I ordered a repeat echo, continue to diureses

## 2018-11-07 NOTE — PROCEDURES
Thoracentesis  Date:  11/07/18  Time:  09:15 AM  Indication: Large Right pleural effusion  Resident:  Obey Oconnor M.D.  Attending:  Cesar Peace M.D.  A time-out was completed verifying correct patient, procedure, site, positioning, and special equipment if applicable. The patient’s   Right  side was prepped and draped in a sterile manner after the appropriate infiltration level was confirmed by ultrasound. 1% lidocaine was used anesthetize the surrounding skin. A finder needle was then used to locate fluid and clear yellow fluid was obtained. A 10-blade scalpel used to make the incision. The thoracentesis catheter was then threaded without difficulty. The patient had 10 ml of bloody/red serosanginous fluid removed.   Dr. Peace  was present for the entire procedure.  The fluid will be sent for several studies.  Estimated Blood Loss: 30 ml.  (difficulty getting more output, so stopped after getting diagnostic sample of 10 ml, with estimated 10-20 ml in jar).   The patient tolerated the procedure well and there were no complications.  Since there was a low volume output, surgery (Dr. Zavaleta) was consulted for a chest tube; and arrived for chest tube placement, prior to follow-up CXR.  The subsequent CXR noted improved pleural effusion, and tiny right apical pneumothorax (after both thoracentesis and chest tube).   Obey Oconnor M.D. , 11/7/2018   Attending:  Cesar Peace M.D.

## 2018-11-07 NOTE — PROCEDURES
DATE OF OPERATION: 11/7/2018     PREOPERATIVE DIAGNOSES:   Respiratory failure  Right side effusion    POSTOPERATIVE DIAGNOSES:   Respiratory failure  Right side effusion     PROCEDURES PERFORMED:   1. Right tube thoracostomy.     SURGEON: Jin Zavaleta M.D.     INDICATIONS: The patient is a 52 y.o. male with Respiratory failure  Right side effusion. A  right chest tube was placed       FINDINGS: Bloody effusion   2000 initial     WOUND CLASSIFICATION: Class II, Clean-Contaminated.    PROCEDURE: Following implied emergent consent, the patient was properly identified and  Positioned as best possible not able tolerate flat. The right chest wall was widely prepped and draped into a sterile field.     Local anesthetic was used to infiltrate the chest tube site. Multiple intercostal nerve blocks were placed above and below the chest tube site dermatome. A 1-cm transverse incision was made and the subcutaneous tissue spread bluntly. The thoracic cavity was accessed bluntly over the rib and a 32 Fr chest tube placed and secured to the skin with a 0-Ethibond vertical mattress suture.     The chest tube was connected to closed suction drainage and a sterile dressing was applied. The patient tolerated the procedure well. There were no apparent complications.   ____________________________________   Jin Zavaleta M.D.    DD: 11/7/2018  12:02 PM

## 2018-11-07 NOTE — ASSESSMENT & PLAN NOTE
Persistent leukocytosis noted upon review of the labs from most recent admits  Patient has been on steroids  Current chest x-ray suggest possible pneumonia  Evaluation ongoing  Serial CBC  Pending culture results of blood and pleural fluid will start/continue antibiotic for possible health care associated pneumonia

## 2018-11-08 ENCOUNTER — APPOINTMENT (OUTPATIENT)
Dept: CARDIOLOGY | Facility: MEDICAL CENTER | Age: 53
DRG: 199 | End: 2018-11-08
Attending: STUDENT IN AN ORGANIZED HEALTH CARE EDUCATION/TRAINING PROGRAM
Payer: MEDICARE

## 2018-11-08 ENCOUNTER — APPOINTMENT (OUTPATIENT)
Dept: RADIOLOGY | Facility: MEDICAL CENTER | Age: 53
DRG: 199 | End: 2018-11-08
Attending: INTERNAL MEDICINE
Payer: MEDICARE

## 2018-11-08 LAB
ALBUMIN SERPL BCP-MCNC: 3 G/DL (ref 3.2–4.9)
ALBUMIN/GLOB SERPL: 0.8 G/DL
ALP SERPL-CCNC: 106 U/L (ref 30–99)
ALT SERPL-CCNC: 18 U/L (ref 2–50)
ANION GAP SERPL CALC-SCNC: 7 MMOL/L (ref 0–11.9)
AST SERPL-CCNC: 21 U/L (ref 12–45)
BACTERIA BLD CULT: NORMAL
BACTERIA BLD CULT: NORMAL
BASOPHILS # BLD AUTO: 0.4 % (ref 0–1.8)
BASOPHILS # BLD: 0.1 K/UL (ref 0–0.12)
BILIRUB SERPL-MCNC: 0.7 MG/DL (ref 0.1–1.5)
BUN SERPL-MCNC: 40 MG/DL (ref 8–22)
CALCIUM SERPL-MCNC: 9 MG/DL (ref 8.5–10.5)
CHLORIDE SERPL-SCNC: 101 MMOL/L (ref 96–112)
CO2 SERPL-SCNC: 25 MMOL/L (ref 20–33)
CREAT SERPL-MCNC: 1.43 MG/DL (ref 0.5–1.4)
CYTOLOGY REG CYTOL: NORMAL
EOSINOPHIL # BLD AUTO: 0.01 K/UL (ref 0–0.51)
EOSINOPHIL NFR BLD: 0 % (ref 0–6.9)
ERYTHROCYTE [DISTWIDTH] IN BLOOD BY AUTOMATED COUNT: 69.2 FL (ref 35.9–50)
GLOBULIN SER CALC-MCNC: 3.8 G/DL (ref 1.9–3.5)
GLUCOSE BLD-MCNC: 149 MG/DL (ref 65–99)
GLUCOSE BLD-MCNC: 155 MG/DL (ref 65–99)
GLUCOSE BLD-MCNC: 164 MG/DL (ref 65–99)
GLUCOSE BLD-MCNC: 165 MG/DL (ref 65–99)
GLUCOSE BLD-MCNC: 256 MG/DL (ref 65–99)
GLUCOSE SERPL-MCNC: 167 MG/DL (ref 65–99)
HCT VFR BLD AUTO: 31 % (ref 42–52)
HGB BLD-MCNC: 8 G/DL (ref 14–18)
IMM GRANULOCYTES # BLD AUTO: 0.37 K/UL (ref 0–0.11)
IMM GRANULOCYTES NFR BLD AUTO: 1.5 % (ref 0–0.9)
INR PPP: 1.34 (ref 0.87–1.13)
LV EJECT FRACT  99904: 55
LV EJECT FRACT MOD 2C 99903: 71.17
LV EJECT FRACT MOD 4C 99902: 67.86
LV EJECT FRACT MOD BP 99901: 69.37
LYMPHOCYTES # BLD AUTO: 0.74 K/UL (ref 1–4.8)
LYMPHOCYTES NFR BLD: 3 % (ref 22–41)
MAGNESIUM SERPL-MCNC: 2.2 MG/DL (ref 1.5–2.5)
MCH RBC QN AUTO: 18.6 PG (ref 27–33)
MCHC RBC AUTO-ENTMCNC: 25.8 G/DL (ref 33.7–35.3)
MCV RBC AUTO: 71.9 FL (ref 81.4–97.8)
MONOCYTES # BLD AUTO: 1.33 K/UL (ref 0–0.85)
MONOCYTES NFR BLD AUTO: 5.4 % (ref 0–13.4)
MORPHOLOGY BLD-IMP: NORMAL
NEUTROPHILS # BLD AUTO: 22.18 K/UL (ref 1.82–7.42)
NEUTROPHILS NFR BLD: 89.7 % (ref 44–72)
NRBC # BLD AUTO: 0.08 K/UL
NRBC BLD-RTO: 0.3 /100 WBC
PLATELET # BLD AUTO: 283 K/UL (ref 164–446)
PMV BLD AUTO: 9.5 FL (ref 9–12.9)
POTASSIUM SERPL-SCNC: 4.7 MMOL/L (ref 3.6–5.5)
PROT SERPL-MCNC: 6.8 G/DL (ref 6–8.2)
PROTHROMBIN TIME: 16.7 SEC (ref 12–14.6)
RBC # BLD AUTO: 4.31 M/UL (ref 4.7–6.1)
SIGNIFICANT IND 70042: NORMAL
SIGNIFICANT IND 70042: NORMAL
SITE SITE: NORMAL
SITE SITE: NORMAL
SODIUM SERPL-SCNC: 133 MMOL/L (ref 135–145)
SOURCE SOURCE: NORMAL
SOURCE SOURCE: NORMAL
VANCOMYCIN SERPL-MCNC: 20.3 UG/ML
WBC # BLD AUTO: 24.7 K/UL (ref 4.8–10.8)

## 2018-11-08 PROCEDURE — 80053 COMPREHEN METABOLIC PANEL: CPT

## 2018-11-08 PROCEDURE — 700101 HCHG RX REV CODE 250: Performed by: INTERNAL MEDICINE

## 2018-11-08 PROCEDURE — A9270 NON-COVERED ITEM OR SERVICE: HCPCS | Performed by: STUDENT IN AN ORGANIZED HEALTH CARE EDUCATION/TRAINING PROGRAM

## 2018-11-08 PROCEDURE — 80202 ASSAY OF VANCOMYCIN: CPT

## 2018-11-08 PROCEDURE — 700102 HCHG RX REV CODE 250 W/ 637 OVERRIDE(OP): Performed by: STUDENT IN AN ORGANIZED HEALTH CARE EDUCATION/TRAINING PROGRAM

## 2018-11-08 PROCEDURE — 700105 HCHG RX REV CODE 258: Performed by: STUDENT IN AN ORGANIZED HEALTH CARE EDUCATION/TRAINING PROGRAM

## 2018-11-08 PROCEDURE — A6250 SKIN SEAL PROTECT MOISTURIZR: HCPCS | Performed by: INTERNAL MEDICINE

## 2018-11-08 PROCEDURE — 700111 HCHG RX REV CODE 636 W/ 250 OVERRIDE (IP): Performed by: STUDENT IN AN ORGANIZED HEALTH CARE EDUCATION/TRAINING PROGRAM

## 2018-11-08 PROCEDURE — 94640 AIRWAY INHALATION TREATMENT: CPT

## 2018-11-08 PROCEDURE — 94669 MECHANICAL CHEST WALL OSCILL: CPT

## 2018-11-08 PROCEDURE — 99291 CRITICAL CARE FIRST HOUR: CPT | Performed by: INTERNAL MEDICINE

## 2018-11-08 PROCEDURE — 93306 TTE W/DOPPLER COMPLETE: CPT

## 2018-11-08 PROCEDURE — 82962 GLUCOSE BLOOD TEST: CPT

## 2018-11-08 PROCEDURE — 85025 COMPLETE CBC W/AUTO DIFF WBC: CPT

## 2018-11-08 PROCEDURE — 85610 PROTHROMBIN TIME: CPT

## 2018-11-08 PROCEDURE — 770020 HCHG ROOM/CARE - TELE (206)

## 2018-11-08 PROCEDURE — 94760 N-INVAS EAR/PLS OXIMETRY 1: CPT

## 2018-11-08 PROCEDURE — 99231 SBSQ HOSP IP/OBS SF/LOW 25: CPT | Performed by: INTERNAL MEDICINE

## 2018-11-08 PROCEDURE — 83735 ASSAY OF MAGNESIUM: CPT

## 2018-11-08 PROCEDURE — 93306 TTE W/DOPPLER COMPLETE: CPT | Mod: 26 | Performed by: INTERNAL MEDICINE

## 2018-11-08 PROCEDURE — 94668 MNPJ CHEST WALL SBSQ: CPT

## 2018-11-08 PROCEDURE — 71046 X-RAY EXAM CHEST 2 VIEWS: CPT

## 2018-11-08 RX ORDER — IPRATROPIUM BROMIDE AND ALBUTEROL SULFATE 2.5; .5 MG/3ML; MG/3ML
3 SOLUTION RESPIRATORY (INHALATION)
Status: DISCONTINUED | OUTPATIENT
Start: 2018-11-09 | End: 2018-11-10 | Stop reason: HOSPADM

## 2018-11-08 RX ORDER — DEXTROSE MONOHYDRATE 25 G/50ML
25 INJECTION, SOLUTION INTRAVENOUS
Status: DISCONTINUED | OUTPATIENT
Start: 2018-11-08 | End: 2018-11-10 | Stop reason: HOSPADM

## 2018-11-08 RX ORDER — FUROSEMIDE 20 MG/1
20 TABLET ORAL
Status: DISCONTINUED | OUTPATIENT
Start: 2018-11-08 | End: 2018-11-10 | Stop reason: HOSPADM

## 2018-11-08 RX ADMIN — FUROSEMIDE 20 MG: 20 TABLET ORAL at 16:59

## 2018-11-08 RX ADMIN — LISINOPRIL 2.5 MG: 5 TABLET ORAL at 04:42

## 2018-11-08 RX ADMIN — INSULIN LISPRO 1 UNITS: 100 INJECTION, SOLUTION INTRAVENOUS; SUBCUTANEOUS at 17:52

## 2018-11-08 RX ADMIN — IPRATROPIUM BROMIDE AND ALBUTEROL SULFATE 3 ML: .5; 3 SOLUTION RESPIRATORY (INHALATION) at 06:52

## 2018-11-08 RX ADMIN — CEFEPIME 2 G: 2 INJECTION, POWDER, FOR SOLUTION INTRAVENOUS at 11:35

## 2018-11-08 RX ADMIN — OXYCODONE AND ACETAMINOPHEN 1 TABLET: 5; 325 TABLET ORAL at 22:17

## 2018-11-08 RX ADMIN — FUROSEMIDE 20 MG: 20 TABLET ORAL at 09:57

## 2018-11-08 RX ADMIN — OXYCODONE HYDROCHLORIDE AND ACETAMINOPHEN 500 MG: 500 TABLET ORAL at 04:41

## 2018-11-08 RX ADMIN — CEFEPIME 2 G: 2 INJECTION, POWDER, FOR SOLUTION INTRAVENOUS at 04:41

## 2018-11-08 RX ADMIN — STANDARDIZED SENNA CONCENTRATE AND DOCUSATE SODIUM 2 TABLET: 8.6; 5 TABLET, FILM COATED ORAL at 04:41

## 2018-11-08 RX ADMIN — OXYCODONE AND ACETAMINOPHEN 1 TABLET: 5; 325 TABLET ORAL at 04:41

## 2018-11-08 RX ADMIN — AMIODARONE HYDROCHLORIDE 200 MG: 200 TABLET ORAL at 04:42

## 2018-11-08 RX ADMIN — INSULIN LISPRO 1 UNITS: 100 INJECTION, SOLUTION INTRAVENOUS; SUBCUTANEOUS at 05:20

## 2018-11-08 RX ADMIN — BUDESONIDE AND FORMOTEROL FUMARATE DIHYDRATE 2 PUFF: 160; 4.5 AEROSOL RESPIRATORY (INHALATION) at 04:42

## 2018-11-08 RX ADMIN — OXYCODONE AND ACETAMINOPHEN 1 TABLET: 5; 325 TABLET ORAL at 12:58

## 2018-11-08 RX ADMIN — INSULIN LISPRO 3 UNITS: 100 INJECTION, SOLUTION INTRAVENOUS; SUBCUTANEOUS at 11:30

## 2018-11-08 RX ADMIN — BUDESONIDE AND FORMOTEROL FUMARATE DIHYDRATE 2 PUFF: 160; 4.5 AEROSOL RESPIRATORY (INHALATION) at 17:48

## 2018-11-08 RX ADMIN — METOPROLOL SUCCINATE 25 MG: 25 TABLET, EXTENDED RELEASE ORAL at 04:41

## 2018-11-08 RX ADMIN — PREDNISONE 40 MG: 20 TABLET ORAL at 04:42

## 2018-11-08 RX ADMIN — GUAIFENESIN 600 MG: 600 TABLET, EXTENDED RELEASE ORAL at 04:41

## 2018-11-08 RX ADMIN — IPRATROPIUM BROMIDE AND ALBUTEROL SULFATE 3 ML: .5; 3 SOLUTION RESPIRATORY (INHALATION) at 18:15

## 2018-11-08 RX ADMIN — CEFEPIME 2 G: 2 INJECTION, POWDER, FOR SOLUTION INTRAVENOUS at 19:45

## 2018-11-08 RX ADMIN — SPIRONOLACTONE 25 MG: 25 TABLET ORAL at 04:42

## 2018-11-08 RX ADMIN — GUAIFENESIN 600 MG: 600 TABLET, EXTENDED RELEASE ORAL at 17:47

## 2018-11-08 RX ADMIN — FERROUS SULFATE TAB 325 MG (65 MG ELEMENTAL FE) 325 MG: 325 (65 FE) TAB at 07:58

## 2018-11-08 RX ADMIN — INSULIN LISPRO 1 UNITS: 100 INJECTION, SOLUTION INTRAVENOUS; SUBCUTANEOUS at 00:13

## 2018-11-08 RX ADMIN — IPRATROPIUM BROMIDE AND ALBUTEROL SULFATE 3 ML: .5; 3 SOLUTION RESPIRATORY (INHALATION) at 15:01

## 2018-11-08 RX ADMIN — IPRATROPIUM BROMIDE AND ALBUTEROL SULFATE 3 ML: .5; 3 SOLUTION RESPIRATORY (INHALATION) at 02:41

## 2018-11-08 RX ADMIN — ROSUVASTATIN CALCIUM 40 MG: 20 TABLET, FILM COATED ORAL at 04:41

## 2018-11-08 ASSESSMENT — ENCOUNTER SYMPTOMS
WEIGHT LOSS: 0
MUSCULOSKELETAL NEGATIVE: 1
DOUBLE VISION: 0
EYES NEGATIVE: 1
WEAKNESS: 1
SPUTUM PRODUCTION: 1
ORTHOPNEA: 1
DIARRHEA: 0
CONSTIPATION: 0
WEAKNESS: 0
DIAPHORESIS: 1
EYE PAIN: 0
DEPRESSION: 0
CHILLS: 0
SORE THROAT: 0
COUGH: 1
FEVER: 0
CARDIOVASCULAR NEGATIVE: 1
SHORTNESS OF BREATH: 1
FLANK PAIN: 0
BACK PAIN: 1
HEADACHES: 0
TREMORS: 0
PSYCHIATRIC NEGATIVE: 1
HEMOPTYSIS: 0
ORTHOPNEA: 0
PALPITATIONS: 0
VOMITING: 0
SPEECH CHANGE: 0
STRIDOR: 0
MYALGIAS: 1
FALLS: 1
BLURRED VISION: 0
DIZZINESS: 0
NERVOUS/ANXIOUS: 0
ABDOMINAL PAIN: 0
DIAPHORESIS: 0
FOCAL WEAKNESS: 0
SHORTNESS OF BREATH: 0
GASTROINTESTINAL NEGATIVE: 1
NAUSEA: 0
BRUISES/BLEEDS EASILY: 1

## 2018-11-08 ASSESSMENT — COGNITIVE AND FUNCTIONAL STATUS - GENERAL
MOVING FROM LYING ON BACK TO SITTING ON SIDE OF FLAT BED: A LITTLE
TURNING FROM BACK TO SIDE WHILE IN FLAT BAD: A LITTLE
STANDING UP FROM CHAIR USING ARMS: A LITTLE
MOBILITY SCORE: 18
PERSONAL GROOMING: A LITTLE
CLIMB 3 TO 5 STEPS WITH RAILING: A LITTLE
HELP NEEDED FOR BATHING: A LOT
TOILETING: A LITTLE
EATING MEALS: A LITTLE
DRESSING REGULAR UPPER BODY CLOTHING: A LITTLE
MOVING TO AND FROM BED TO CHAIR: A LOT
SUGGESTED CMS G CODE MODIFIER MOBILITY: CK
DRESSING REGULAR LOWER BODY CLOTHING: A LITTLE
DAILY ACTIVITIY SCORE: 17
SUGGESTED CMS G CODE MODIFIER DAILY ACTIVITY: CK

## 2018-11-08 ASSESSMENT — PAIN SCALES - GENERAL
PAINLEVEL_OUTOF10: 0
PAINLEVEL_OUTOF10: 2
PAINLEVEL_OUTOF10: 6
PAINLEVEL_OUTOF10: 8
PAINLEVEL_OUTOF10: 7
PAINLEVEL_OUTOF10: 7
PAINLEVEL_OUTOF10: 3
PAINLEVEL_OUTOF10: 6
PAINLEVEL_OUTOF10: 3

## 2018-11-08 ASSESSMENT — LIFESTYLE VARIABLES: SUBSTANCE_ABUSE: 0

## 2018-11-08 NOTE — ASSESSMENT & PLAN NOTE
Pt presents with pulmonary edema and large right pleural effusion, CHF exacerbation seems likely although has mixed picture with old blood in right chest from recent trauma    - echo  - gently diurese as BP allows  - track renal function closely    Check echo results  Gently diurese (target 500 to 1 liter net negative  Follow creatinine, BUN, bicarb, BP, heart rate  Likely d/c chest tube soon (minimal drainage over night)

## 2018-11-08 NOTE — ASSESSMENT & PLAN NOTE
Possible pneumonia or parapneumonic effusion in addition to bleeding  , blood and pleural fluid cultures negative for organisms   Treated empirically with Cefepime in the ICU  Stopped Cefepime 11/9/18, started empiric CAP treatment with cefdinir 300 mg PO BID and doxy 100 mg BID  Will discharge patient with 5 day course of cefdinir 300 mg PO BID and doxy 100 mg BID   Jaimie Fraser

## 2018-11-08 NOTE — PROGRESS NOTES
"Critical Care Progress Note    Date of admission  11/6/2018    Chief Complaint  52 y.o. male admitted 11/6/2018 with SOB    Hospital Course   Admitted from ED to ICU last night with severe SOB  Large right pleural effusion diagnosed as old blood on thora  Small chest tube placed by surgery draining off almot 3 liters of old bloody sero-sanginous fluid  Pt delveloped worsening SOB immediately post large volume thora. CXR shows re-expansion pulmonary edema  Transient hypotension post thor  Lots RBC in thora/ looks exudative but grossly infected    Interval Problem Update  Reviewed last 24 hour events:  Elevated WBC  CT shows minimal right effusion, small right pneumo  Pulmonary edema pattern decreased  Feels \"better today than I have for may weeks. I can breath again.\"  Eating well  Walking in room  On nasal cannula  3.2 liters net negative last 24 hours  Creatinine 1.7  Used Bipap minimally last night (pt declined)     Prior 24  Thora with 3 liter thin bloody fluid removed  Chest tube by surgery  RE-expansion pulmonary edema post chest tube now better  Now off Bipap looks fragile but improved    Review of Systems  Review of Systems   Constitutional: Positive for diaphoresis and malaise/fatigue. Negative for chills, fever and weight loss.   HENT: Negative.    Eyes: Negative.    Respiratory: Positive for cough, sputum production and shortness of breath.    Cardiovascular: Negative.    Gastrointestinal: Negative.    Genitourinary: Negative.    Musculoskeletal: Negative.    Skin: Negative.    Neurological: Positive for weakness.   Endo/Heme/Allergies: Negative.    Psychiatric/Behavioral: Negative.         Vital Signs for last 24 hours   Temp:  [37 °C (98.6 °F)-37.2 °C (99 °F)] 37 °C (98.6 °F)  Pulse:  [72-91] 72  Resp:  [7-31] 19    Hemodynamic parameters for last 24 hours       Respiratory       Physical Exam   Physical Exam    Medications  Current Facility-Administered Medications   Medication Dose Route Frequency " Provider Last Rate Last Dose   • furosemide (LASIX) tablet 20 mg  20 mg Oral BID DIURETIC Obey Oconnor M.D.   20 mg at 11/08/18 0957   • Respiratory Care per Protocol   Nebulization Continuous RT Andrzej Larsen M.D.       • senna-docusate (PERICOLACE or SENOKOT S) 8.6-50 MG per tablet 2 Tab  2 Tab Oral BID Andrzej Larsen M.D.   2 Tab at 11/08/18 0441    And   • polyethylene glycol/lytes (MIRALAX) PACKET 1 Packet  1 Packet Oral QDAY PRN Andrzej Larsen M.D.        And   • magnesium hydroxide (MILK OF MAGNESIA) suspension 30 mL  30 mL Oral QDAY PRN Andrzej Larsen M.D.        And   • bisacodyl (DULCOLAX) suppository 10 mg  10 mg Rectal QDAY PRN Andrzej Larsen M.D.       • insulin lispro (HUMALOG) injection 1-6 Units  1-6 Units Subcutaneous Q6HRS Andrzej Larsen M.D.   3 Units at 11/08/18 1130    And   • glucose 4 g chewable tablet 16 g  16 g Oral Q15 MIN PRN Andrzej Larsen M.D.        And   • dextrose 50% (D50W) injection 25 mL  25 mL Intravenous Q15 MIN PRN Andrzej Larsen M.D.       • albuterol inhaler 2 Puff  2 Puff Inhalation Q6HRS PRN Andrzej Larsen M.D.       • amiodarone (CORDARONE) tablet 200 mg  200 mg Oral DAILY Andrzej Larsen M.D.   200 mg at 11/08/18 0442   • budesonide-formoterol (SYMBICORT) 160-4.5 MCG/ACT inhaler 2 Puff  2 Puff Inhalation BID Andrzej Larsen M.D.   2 Puff at 11/08/18 0442   • calcium carbonate (TUMS) chewable tab 500 mg  500 mg Oral BID PRN Andrzej Larsen M.D.       • ferrous sulfate tablet 325 mg  325 mg Oral QDAY with Breakfast Andrzej Larsen M.D.   325 mg at 11/08/18 0758   • guaiFENesin LA (MUCINEX) tablet 600 mg  600 mg Oral Q12HRS Andrzej Larsen M.D.   600 mg at 11/08/18 0441   • lisinopril (PRINIVIL) tablet 2.5 mg  2.5 mg Oral DAILY Andrzej Larsen M.D.   2.5 mg at 11/08/18 0442   • metoprolol SR (TOPROL XL) tablet 25 mg  25 mg Oral DAILY Andrzej Larsen M.D.   25 mg at 11/08/18 0441   • oxyCODONE-acetaminophen (PERCOCET) 5-325 MG  per tablet 1 Tab  1 Tab Oral Q6HRS PRN Andrzej Larsen M.D.   1 Tab at 11/08/18 1258   • rosuvastatin (CRESTOR) tablet 40 mg  40 mg Oral DAILY Andrzej Larsen M.D.   40 mg at 11/08/18 0441   • spironolactone (ALDACTONE) tablet 25 mg  25 mg Oral DAILY Andrzej Larsen M.D.   25 mg at 11/08/18 0442   • ascorbic acid tablet 500 mg  500 mg Oral DAILY Andrzej Larsen M.D.   500 mg at 11/08/18 0441   • cefepime (MAXIPIME) 2 g in  mL IVPB  2 g Intravenous Q8HRS Andrzej Larsen M.D.   Stopped at 11/08/18 1205   • ipratropium-albuterol (DUONEB) nebulizer solution  3 mL Nebulization Q4H PRN (RT) Cesar Peace M.D.       • predniSONE (DELTASONE) tablet 40 mg  40 mg Oral DAILY Obey Oconnor M.D.   40 mg at 11/08/18 0442   • ipratropium-albuterol (DUONEB) nebulizer solution  3 mL Nebulization Q4HRS (RT) Cesar Peace M.D.   3 mL at 11/08/18 0652       Fluids    Intake/Output Summary (Last 24 hours) at 11/08/18 1339  Last data filed at 11/08/18 1130   Gross per 24 hour   Intake          1793.33 ml   Output             2050 ml   Net          -256.67 ml       Laboratory  Recent Labs      11/07/18   0244   ISTATAPH  7.465   ISTATAPCO2  41.1*   ISTATAPO2  67   ISTATATCO2  31   MDUMFKQ8GET  94   ISTATARTHCO3  29.6*   ISTATARTBE  5*   ISTATTEMP  see below   ISTATFIO2  30   ISTATSPEC  Arterial     Recent Labs      11/06/18 2257  11/07/18   1213  11/07/18   1753   TROPONINI  0.14*  0.11*  0.12*   BNPBTYPENAT  445*   --    --      Recent Labs      11/06/18 2257 11/07/18   1213  11/08/18   0519   SODIUM  134*  135  133*   POTASSIUM  4.7  4.8  4.7   CHLORIDE  99  100  101   CO2  29  26  25   BUN  32*  32*  40*   CREATININE  1.28  1.41*  1.43*   MAGNESIUM   --   2.1  2.2   CALCIUM  9.2  9.4  9.0     Recent Labs      11/06/18   2257  11/07/18   1213  11/08/18   0519   ALTSGPT  23  22  18   ASTSGOT  25  21  21   ALKPHOSPHAT  118*  118*  106*   TBILIRUBIN  0.7  0.9  0.7   AMYLASE   --   27   --    GLUCOSE   153*  176*  167*     Recent Labs      11/06/18   2257  11/07/18   0934  11/07/18   1213  11/08/18   0519   WBC  20.3*   --   22.0*  24.7*   NEUTSPOLYS  85.20*   --   92.00*  89.70*   LYMPHOCYTES  6.10*   --   1.60*  3.00*   MONOCYTES  6.10   --   1.40  5.40   EOSINOPHILS  1.70  2  0.20  0.00   BASOPHILS  0.00   --   0.20  0.40   ASTSGOT  25   --   21  21   ALTSGPT  23   --   22  18   ALKPHOSPHAT  118*   --   118*  106*   TBILIRUBIN  0.7   --   0.9  0.7     Recent Labs      11/06/18 2257 11/07/18   0800  11/07/18   1213  11/08/18   0519   RBC  4.67*   --   4.89  4.31*   HEMOGLOBIN  8.6*   --   9.2*  8.0*   HEMATOCRIT  33.4*   --   34.6*  31.0*   PLATELETCT  290   --   322  283   PROTHROMBTM  38.0*  36.9*   --   16.7*   APTT  57.9*   --    --    --    INR  3.88*  3.73*   --   1.34*       Imaging      Assessment/Plan  * Acute on chronic respiratory failure (HCC)- (present on admission)   Assessment & Plan    BiPAP therapy -much improved work of breathing after initiating therapy in the ER and continuing it in the ICU  Treatment for pneumonia and COPD exacerbation and to a lesser degree congestive heart failure   Right-sided pleural effusion also contributing significantly and patient needs thoracentesis after CT chest  RT protocols  Monitor for the need for intubation and mechanical ventilation  If intubated diagnostic and therapeutic bronchoscopy will be necessary and appropriate    I believe that the large pleural effusion was the main  of this episode of acute and severe SOB. Greatly improved with drainage of effusion this afternoon     Pneumonia due to infectious organism- (present on admission)   Assessment & Plan    New right-sided opacities consistent with pneumonia with probable secondary pleural effusion  Hemoglobin stable, doubt hemothorax from recent rib fracture but eval appropriate  CT scan chest pending, was not performed earlier due to respiratory issues, going this a.m. to the scanner  May need  thoracentesis  INR 3.73, holding warfarin, may need FFP/vitamin K for reversal for safe invasive procedure and right chest  Pan culture sent in ER  If fails from a respiratory standpoint and requires intubation patient should receive a diagnostic and therapeutic bronchoscopy with probable right lower lobe BAL, CT and initial bronchoscopic findings can be used to further guide diagnostic and therapeutic approach  Intravenous antibiotics to cover healthcare associated organisms, de-escalate rapidly as clinically appropriate, plan probable 7-day course given severity of his current state and multiple comorbidities  Mobilize/aggressive pulmonary toilet as respiratory status and hemodynamics allow  RT protocols and every 4 nebulizer treatments, CPT to right base if chest discomfort from recent rib fractures will allow    CT of chest pending, will consider bronch for tomorrow     Chronic obstructive pulmonary disease with acute exacerbation (HCC)- (present on admission)   Assessment & Plan    BiPAP  RT protocols  IV steroids  Nebulized treatments with DuoNeb every 4 and every 2 as needed  Mucolytic's  Monitor for the need for intubation and mechanical ventilation    Will use night-time bipap  Outpt sleep study  Diresees  Short course steroids     Heart failure with preserved ejection fraction, borderline, class IV (HCC)- (present on admission)   Assessment & Plan    Patient with more right than left heart failure at this time, aggressive diuresis not necessary  Forced diuresis as needed - Spironolactone/furosemide  Afterload reduction as needed, goal -140, ACE inhibitor    Pt likely has large element cor pulmonale. He may be near end-stage       Paroxysmal atrial fibrillation (HCC)- (present on admission)   Assessment & Plan    In sinus rhythm by EKG on admit with controlled heart rate  Continue amiodarone  Optimize electrolytes  Additional rate control with calcium channel blockers or beta blockers as needed, would  prefer calcium channel blockers with bronchospasm at this time  INR a bit high and admission at 3.73, will hold warfarin today and follow serial coagulation studies  May need to reverse anticoagulation in part to perform safe thoracentesis    Critically ill with multi-organ dysfunction. Pt/family appreciate that pt is fragile with high risk of mortality on this hospital admission       CAD (coronary artery disease)- (present on admission)   Assessment & Plan    History of coronary artery disease and prior coronary artery bypass  Cardiac monitoring  Echocardiogram  Serial troponin I  Cardiac consultation as needed  Fully anticoagulated with warfarin, was not on aspirin  Statin    Unclear if cardiac issues are greatly contributing but pt is globally fluid overloaded  I ordered a repeat echo, continue to diureses     Acute exacerbation of CHF (congestive heart failure) (HCC)- (present on admission)   Assessment & Plan    Pt presents with pulmonary edema and large right pleural effusion, CHF exacerbation seems likely although has mixed picture with old blood in right chest from recent trauma    - echo  - gently diurese as BP allows  - track renal function closely     Elevated troponin   Assessment & Plan    Suspect demand ischemia, serial troponin I pending  Echocardiogram  Cardiac monitoring  Cardiology consultation if clinically appropriate, monitoring     Morbid obesity with BMI of 40.0-44.9, adult (HCC)- (present on admission)   Assessment & Plan    Behavior modification weight loss to be encouraged     Closed fracture of multiple ribs of right side- (present on admission)   Assessment & Plan    Recent fall off EMS gurney by report with rib 5 through 8 fractures noted radiographically and initial x-ray complicated by small pneumothorax which resolved with conservative therapy  Patient also injured right elbow and right hand  Clinically improved per patient with decreased pain and no pneumothorax seen on chest  x-ray  Had been followed by surgery, reconsult if needed, does not appear to be necessary at this time    Has blood fluid in chest likely related to rib fx a weeks ago, Now has chest tube, bleeding appears to have abated     Microcytic anemia- (present on admission)   Assessment & Plan    Transfuse to hemoglobin greater than 7.0  Serial CBC  Stool occult studies  GI eval if indicated  Given significant comorbidities with cardiopulmonary issues the patient is not a very good candidate for therapy or diagnostics at this time    Track serial H/H     SHASHANK (obstructive sleep apnea)- (present on admission)   Assessment & Plan    Continue positive airway pressure with sleep both day and night  Weight loss to be encouraged  Patient can use home mask and equipment when clinically appropriate, requiring our BiPAP unit at this time     Leukocytosis- (present on admission)   Assessment & Plan    Persistent leukocytosis noted upon review of the labs from most recent admits  Patient has been on steroids  Current chest x-ray suggest possible pneumonia  Evaluation ongoing  Serial CBC  Pending culture results of blood and pleural fluid will start/continue antibiotic for possible health care associated pneumonia          VTE:  Heparin and Contraindicated  Ulcer: Not Indicated  Lines: None    I have performed a physical exam and reviewed and updated ROS and Plan today (11/8/2018). In review of yesterday's note (11/7/2018), there are no changes except as documented above.     Discussed patient condition and risk of morbidity and/or mortality with Hospitalist, Family, RN, RT, Charge nurse / hot rounds and Patient  I spent 35 minutes directly providing and coordinating critical care and extensive data review.  No time overlap and excludes procedures.

## 2018-11-08 NOTE — CARE PLAN
Problem: Bronchoconstriction:  Goal: Improve in air movement and diminished wheezing  Duoneb Q4

## 2018-11-08 NOTE — CARE PLAN
Problem: Pain Management  Goal: Pain level will decrease to patient's comfort goal    Intervention: Follow pain managment plan developed in collaboration with patient and Interdisciplinary Team  Pt medicated per MAR for pain         Problem: Urinary Elimination:  Goal: Ability to reestablish a normal urinary elimination pattern will improve    Intervention: Evaluate need to continue indwelling urinary catheter  Pt remains critically ill, requiring accurate I/O

## 2018-11-08 NOTE — DISCHARGE PLANNING
Care Transition Team Assessment  Lsw spoke to wife at room. She is aware of Lsw from recent admission when we worked together. Pt has been readmitted for a different medical concern. Pt has complications from a fall and breathing issues. They have difficulty establishing w/ a PCP and LSw noted clinic MD has indicated pt has missed several appointments lately and may not be accepted back. Plan is to have medical team make the appointment anyway and see if it is accepted.    Information Source  Orientation : Oriented x 4  Information Given By: Spouse  Informant's Name: Lisa  Who is responsible for making decisions for patient? : Patient    Readmission Evaluation  Is this a readmission?: Yes - unplanned readmission    Elopement Risk  Legal Hold: No  Ambulatory or Self Mobile in Wheelchair: No-Not an Elopement Risk  Elopement Risk: Not at Risk for Elopement    Interdisciplinary Discharge Planning  Primary Care Physician: Nam Le  Lives with - Patient's Self Care Capacity: Significant Other  Support Systems: Spouse / Significant Other  Housing / Facility: 1 Story Apartment / Condo  Do You Take your Prescribed Medications Regularly: Yes    Discharge Preparedness  Prior Functional Level: Ambulatory, Independent with Activities of Daily Living    Functional Assesment  Prior Functional Level: Ambulatory, Independent with Activities of Daily Living    Finances  Prescription Coverage: Yes              Advance Directive  Advance Directive?:  (none on file)    Domestic Abuse  Have you ever been the victim of abuse or violence?: No         Discharge Risks or Barriers  Discharge risks or barriers?: Complex medical needs

## 2018-11-08 NOTE — PROGRESS NOTES
Critical Care Progress Note    Date of admission  11/6/2018    Chief Complaint  52 y.o. male admitted 11/6/2018 with SOB    Hospital Course   Admitted from ED to ICU last night with severe SOB  Large right pleural effusion diagnosed as old blood on thora  Small chest tube placed by surgery draining off almot 3 liters of old bloody sero-sanginous fluid  Pt delveloped worsening SOB immediately post large volume thora. CXR shows re-expansion pulmonary edema  Transient hypotension post thor  Lots RBC in thora/ looks exudative but grossly infected    Interval Problem Update  Reviewed last 24 hour events:  Thora  Chest tube by surgery  RE-expansion pulmonary edema post chest tube now better  Now off Bipap looks fragile but improved    Review of Systems  Review of Systems   Constitutional: Positive for diaphoresis and malaise/fatigue. Negative for chills, fever and weight loss.   HENT: Negative.    Eyes: Negative.    Respiratory: Positive for cough, sputum production and shortness of breath.    Cardiovascular: Negative.    Gastrointestinal: Negative.    Genitourinary: Negative.    Musculoskeletal: Negative.    Skin: Negative.    Neurological: Positive for weakness.   Endo/Heme/Allergies: Negative.    Psychiatric/Behavioral: Negative.         Vital Signs for last 24 hours   Temp:  [36 °C (96.8 °F)-37 °C (98.6 °F)] 36 °C (96.8 °F)  Pulse:  [70-88] 82  Resp:  [17-40] 27  BP: (126)/(68) 126/68    Hemodynamic parameters for last 24 hours       Respiratory       Physical Exam   Physical Exam    Medications  Current Facility-Administered Medications   Medication Dose Route Frequency Provider Last Rate Last Dose   • Respiratory Care per Protocol   Nebulization Continuous RT Andrzej Larsen M.D.       • senna-docusate (PERICOLACE or SENOKOT S) 8.6-50 MG per tablet 2 Tab  2 Tab Oral BID Andrzej Larsen M.D.        And   • polyethylene glycol/lytes (MIRALAX) PACKET 1 Packet  1 Packet Oral QDAY PRN Andrzej Larsen M.D.         And   • magnesium hydroxide (MILK OF MAGNESIA) suspension 30 mL  30 mL Oral QDAY PRN Andrzej Larsen M.D.        And   • bisacodyl (DULCOLAX) suppository 10 mg  10 mg Rectal QDAY PRN Andrzej Larsen M.D.       • insulin lispro (HUMALOG) injection 1-6 Units  1-6 Units Subcutaneous Q6HRS Andrzej Larsen M.D.   1 Units at 11/07/18 1212    And   • glucose 4 g chewable tablet 16 g  16 g Oral Q15 MIN PRN Andrzej Larsen M.D.        And   • dextrose 50% (D50W) injection 25 mL  25 mL Intravenous Q15 MIN PRN Andrzej Larsen M.D.       • albuterol inhaler 2 Puff  2 Puff Inhalation Q6HRS PRN Andrzej Larsen M.D.       • amiodarone (CORDARONE) tablet 200 mg  200 mg Oral DAILY Andrzej Larsen M.D.       • budesonide-formoterol (SYMBICORT) 160-4.5 MCG/ACT inhaler 2 Puff  2 Puff Inhalation BID Andrzej Larsen M.D.   Stopped at 11/07/18 0600   • calcium carbonate (TUMS) chewable tab 500 mg  500 mg Oral BID PRN Andrzej Larsen M.D.       • ferrous sulfate tablet 325 mg  325 mg Oral QDAY with Breakfast Andrzej Larsen M.D.       • guaiFENesin LA (MUCINEX) tablet 600 mg  600 mg Oral Q12HRS Andrzej Larsen M.D.       • lisinopril (PRINIVIL) tablet 2.5 mg  2.5 mg Oral DAILY Andrzej Larsen M.D.       • metoprolol SR (TOPROL XL) tablet 25 mg  25 mg Oral DAILY Andrzej Larsen M.D.       • oxyCODONE-acetaminophen (PERCOCET) 5-325 MG per tablet 1 Tab  1 Tab Oral Q6HRS PRN Andrzej Larsen M.D.       • rosuvastatin (CRESTOR) tablet 40 mg  40 mg Oral DAILY Andrzej Larsen M.D.       • spironolactone (ALDACTONE) tablet 25 mg  25 mg Oral DAILY Andrzej Larsen M.D.       • ascorbic acid tablet 500 mg  500 mg Oral DAILY Andrzej Larsen M.D.       • cefepime (MAXIPIME) 2 g in  mL IVPB  2 g Intravenous Q8HRS Andrzej Larsen M.D.   Stopped at 11/07/18 1247   • MD Alert...Vancomycin per Pharmacy   Other pharmacy to dose Andrzej Larsen M.D.       • SODIUM CHLORIDE 0.9 % IV SOLN            • SODIUM CHLORIDE  0.9 % IV SOLN            • NOREPINEPHRINE BITARTRATE 1 MG/ML IV SOLN        Stopped at 11/07/18 1100   • SODIUM CHLORIDE 0.9 % IV SOLN            • SODIUM CHLORIDE 0.9 % IV SOLN            • morphine (pf) 4 mg/ml injection            • ipratropium-albuterol (DUONEB) nebulizer solution  3 mL Nebulization 4X/DAY (RT) Cesar Peace M.D.       • ipratropium-albuterol (DUONEB) nebulizer solution  3 mL Nebulization Q4H PRN (RT) Cesar Peace M.D.       • predniSONE (DELTASONE) tablet 20 mg  20 mg Oral DAILY Obey Oconnor M.D.       • lactated ringers infusion   Intravenous Continuous Obey Oconnor M.D.           Fluids    Intake/Output Summary (Last 24 hours) at 11/07/18 1632  Last data filed at 11/07/18 1600   Gross per 24 hour   Intake            750.1 ml   Output             5190 ml   Net          -4439.9 ml       Laboratory  Recent Labs      11/07/18   0244   ISTATAPH  7.465   ISTATAPCO2  41.1*   ISTATAPO2  67   ISTATATCO2  31   [id number]  94   ISTATARTHCO3  29.6*   ISTATARTBE  5*   ISTATTEMP  see below   ISTATFIO2  30   ISTATSPEC  Arterial     Recent Labs      11/06/18 2257 11/07/18   1213   TROPONINI  0.14*  0.11*   BNPBTYPENAT  445*   --      Recent Labs      11/06/18 2257  11/07/18   1213   SODIUM  134*  135   POTASSIUM  4.7  4.8   CHLORIDE  99  100   CO2  29  26   BUN  32*  32*   CREATININE  1.28  1.41*   MAGNESIUM   --   2.1   CALCIUM  9.2  9.4     Recent Labs      11/06/18 2257  11/07/18   1213   ALTSGPT  23  22   ASTSGOT  25  21   ALKPHOSPHAT  118*  118*   TBILIRUBIN  0.7  0.9   AMYLASE   --   27   GLUCOSE  153*  176*     Recent Labs      11/06/18 2257 11/07/18   0934  11/07/18   1213   WBC  20.3*   --   22.0*   NEUTSPOLYS  85.20*   --   92.00*   LYMPHOCYTES  6.10*   --   1.60*   MONOCYTES  6.10   --   1.40   EOSINOPHILS  1.70  2  0.20   BASOPHILS  0.00   --   0.20   ASTSGOT  25   --   21   ALTSGPT  23   --   22   ALKPHOSPHAT  118*   --   118*   TBILIRUBIN  0.7   --   0.9      Recent Labs      11/06/18   2257  11/07/18   0800  11/07/18   1213   RBC  4.67*   --   4.89   HEMOGLOBIN  8.6*   --   9.2*   HEMATOCRIT  33.4*   --   34.6*   PLATELETCT  290   --   322   PROTHROMBTM  38.0*  36.9*   --    APTT  57.9*   --    --    INR  3.88*  3.73*   --        Imaging      Assessment/Plan  * Acute on chronic respiratory failure (HCC)- (present on admission)   Assessment & Plan    BiPAP therapy -much improved work of breathing after initiating therapy in the ER and continuing it in the ICU  Treatment for pneumonia and COPD exacerbation and to a lesser degree congestive heart failure   Right-sided pleural effusion also contributing significantly and patient needs thoracentesis after CT chest  RT protocols  Monitor for the need for intubation and mechanical ventilation  If intubated diagnostic and therapeutic bronchoscopy will be necessary and appropriate     Pneumonia due to infectious organism- (present on admission)   Assessment & Plan    New right-sided opacities consistent with pneumonia with probable secondary pleural effusion  Hemoglobin stable, doubt hemothorax from recent rib fracture but eval appropriate  CT scan chest pending, was not performed earlier due to respiratory issues, going this a.m. to the scanner  May need thoracentesis  INR 3.73, holding warfarin, may need FFP/vitamin K for reversal for safe invasive procedure and right chest  Pan culture sent in ER  If fails from a respiratory standpoint and requires intubation patient should receive a diagnostic and therapeutic bronchoscopy with probable right lower lobe BAL, CT and initial bronchoscopic findings can be used to further guide diagnostic and therapeutic approach  Intravenous antibiotics to cover healthcare associated organisms, de-escalate rapidly as clinically appropriate, plan probable 7-day course given severity of his current state and multiple comorbidities  Mobilize/aggressive pulmonary toilet as respiratory status and  hemodynamics allow  RT protocols and every 4 nebulizer treatments, CPT to right base if chest discomfort from recent rib fractures will allow     Chronic obstructive pulmonary disease with acute exacerbation (HCC)- (present on admission)   Assessment & Plan    BiPAP  RT protocols  IV steroids  Nebulized treatments with DuoNeb every 4 and every 2 as needed  Mucolytic's  Monitor for the need for intubation and mechanical ventilation     Heart failure with preserved ejection fraction, borderline, class IV (HCC)- (present on admission)   Assessment & Plan    Patient with more right than left heart failure at this time, aggressive diuresis not necessary  Forced diuresis as needed - Spironolactone/furosemide  Afterload reduction as needed, goal -140, ACE inhibitor       Paroxysmal atrial fibrillation (HCC)- (present on admission)   Assessment & Plan    In sinus rhythm by EKG on admit with controlled heart rate  Continue amiodarone  Optimize electrolytes  Additional rate control with calcium channel blockers or beta blockers as needed, would prefer calcium channel blockers with bronchospasm at this time  INR a bit high and admission at 3.73, will hold warfarin today and follow serial coagulation studies  May need to reverse anticoagulation in part to perform safe thoracentesis       CAD (coronary artery disease)- (present on admission)   Assessment & Plan    History of coronary artery disease and prior coronary artery bypass  Cardiac monitoring  Echocardiogram  Serial troponin I  Cardiac consultation as needed  Fully anticoagulated with warfarin, was not on aspirin  Statin     Elevated troponin   Assessment & Plan    Suspect demand ischemia, serial troponin I pending  Echocardiogram  Cardiac monitoring  Cardiology consultation if clinically appropriate, monitoring     Morbid obesity with BMI of 40.0-44.9, adult (HCC)- (present on admission)   Assessment & Plan    Behavior modification weight loss to be encouraged      Closed fracture of multiple ribs of right side- (present on admission)   Assessment & Plan    Recent fall off EMS ruslan by report with rib 5 through 8 fractures noted radiographically and initial x-ray complicated by small pneumothorax which resolved with conservative therapy  Patient also injured right elbow and right hand  Clinically improved per patient with decreased pain and no pneumothorax seen on chest x-ray  Had been followed by surgery, reconsult if needed, does not appear to be necessary at this time     Microcytic anemia- (present on admission)   Assessment & Plan    Transfuse to hemoglobin greater than 7.0  Serial CBC  Stool occult studies  GI eval if indicated  Given significant comorbidities with cardiopulmonary issues the patient is not a very good candidate for therapy or diagnostics at this time     SHASHANK (obstructive sleep apnea)- (present on admission)   Assessment & Plan    Continue positive airway pressure with sleep both day and night  Weight loss to be encouraged  Patient can use home mask and equipment when clinically appropriate, requiring our BiPAP unit at this time     Leukocytosis- (present on admission)   Assessment & Plan    Persistent leukocytosis noted upon review of the labs from most recent admits  Patient has been on steroids  Current chest x-ray suggest possible pneumonia  Evaluation ongoing  Serial CBC          VTE:  Heparin and Contraindicated  Ulcer: Not Indicated  Lines: None    I have performed a physical exam and reviewed and updated ROS and Plan today (11/7/2018). In review of yesterday's note (11/6/2018), there are no changes except as documented above.     Discussed patient condition and risk of morbidity and/or mortality with Hospitalist, Family, RN, RT, Charge nurse / hot rounds and Patient  The patient remains critically ill.  Critical care time = 80minutes in directly providing and coordinating critical care and extensive data review.  No time overlap and excludes  procedures.

## 2018-11-08 NOTE — CARE PLAN
Problem: Bronchoconstriction:  Goal: Improve in air movement and diminished wheezing  Outcome: PROGRESSING AS EXPECTED    Intervention: Implement inhaled treatments  DuoNeb Q4

## 2018-11-08 NOTE — WOUND TEAM
This RN to see patient in T715-1. Pt agreeable to assessment. Wound consult received for right elbow skin tear, skin breast folds and pannus, buttocks/sacrum and bilateral feet. Right upper extremity assessed, stable scabs noted on right lateral forearm. Cleansed scabs w/ NS and gauze. Left WONG. Pt assisted to standing position, mepilex carefully removed, sacrococcygeal area cleansed w/ warm wash cloth and No Rinse Foam soap, skin assessed, skin is intact. No redness noted. Mepilex placed. Pt assisted in supine position in bed. Bilateral lower extremities cleansed w/ warm wash cloth and No rinse foam soap. Bilateral lower extremities assessed, hemosiderin staining and stable statellite scabs noted. Pt w/ non pitting edema on bilateral lower extremities. Pt reports having compression on bilateral lower extremities several years ago and now only elevates extremities. Pt admitted for acute on resp failure w/ pulmonary edema, right pleural effusion and cardiomegaly. Pt not currently a candidate for compression and pt declines to have them in the future. Pannus cleansed w/ warm wash cloth and no rinse foam soap. Pannus is moist, but skin is currently intact. Inter dry's ordered. Breast folds assessed, skin is intact, no moisture noted at this time. No advanced wound care needs.

## 2018-11-08 NOTE — PROGRESS NOTES
[facility] Progress Note      Admit Date: 11/6/2018  ICU Day:  2    Resident(s): Obey Oconnor  Attending: SHIVAM [facility]/ Dr. Peace    Date & Time:   11/8/2018   10:57 AM       Patient ID:    Name:             Joshua Medrano   YOB: 1965  Age:                 52 y.o.  male   MRN:               [id number]    HPI:  52 y.o. Male with Hx of copD (2 L at home), CHF (EF-55%), parox-A-fib, CABG, DLD, and DM-2.  He presented for worsening SOB x 2 days, then respiratory failure, and admission to ICU.  ...  Also with recent past hospital discharge (10/31/18), for Resp.failure, from COPD exacerbation.  On that visit, had fall from EMS gurney, with rib fractures (right 5-8) and right elbow swelling.      Presented for worsened SOB and oxygen/BIPAP requirements.    Had thoracentesis, then chest tube placed on 11/7/18.  After a transient decrease in BP, due to large amount of fluid loss into chest tube, he stabilized, and then noted better breathing, and less oxygen requirement.     Interval Events:  Improved oxygenation.  On nasal cannula, at 2L O2 (home dose).  Breathing well.  Did well last night, without needing BIPAP.  States he has better breathing.  Mild right rib pains.   Denies acute changes (other than improvement).   States he feels better than he has for weeks.    Consultants:  ICU:  Dr. Eller,  Dr. Peace  Surgery:  Dr. Zavaleta      Review of Systems   Constitutional: Negative for chills and diaphoresis.   HENT: Negative for ear pain.    Eyes: Negative for blurred vision and pain.   Respiratory: Positive for sputum production and shortness of breath (improved - at baseline). Negative for hemoptysis and stridor.    Cardiovascular: Positive for orthopnea and leg swelling (chronic, denies recent change). Negative for chest pain and palpitations.   Gastrointestinal: Negative for abdominal pain, constipation and diarrhea.   Genitourinary: Negative for flank pain and hematuria.   Musculoskeletal:  "Positive for back pain (taiwo.near rib fractures, on right mid-lower ribs) and falls (see HPI / prior transport).   Skin:        Hemosiderin staining of lower legs, with dry feet.    Neurological: Negative for tremors, speech change, focal weakness and weakness.   Endo/Heme/Allergies: Bruises/bleeds easily (was on warfarin, with fall/ rib fxr).   Psychiatric/Behavioral: Negative for substance abuse. The patient is not nervous/anxious.        PHYSICAL EXAM    Vitals:    11/08/18 0600 11/08/18 0652 11/08/18 0700 11/08/18 0800   BP:       Pulse: 79  74 72   Resp: 19  (!) 23 19   Temp:    37 °C (98.6 °F)   SpO2: 97% 97% 99% 96%   Weight:       Height:         Body mass index is 40.2 kg/m².  /68   Pulse 72   Temp 37 °C (98.6 °F)   Resp 19   Ht 1.956 m (6' 5.01\")   Wt (!) 153.8 kg (339 lb 1.1 oz)   SpO2 96%   BMI 40.20 kg/m²    O2 therapy: Pulse Oximetry: 96 %, O2 (LPM): 2, O2 Delivery: Silicone Nasal Cannula    Physical Exam   Constitutional: He is oriented to person, place, and time. No distress.   Obese male   HENT:   Head: Normocephalic and atraumatic.   difficult to evaluate neck/jvd, due to obesity and beard   Eyes: Pupils are equal, round, and reactive to light. EOM are normal.   Neck: No tracheal deviation present.   Difficult to evaluate, due to size   Cardiovascular: Normal rate and regular rhythm.    Pulmonary/Chest: No stridor. He is in respiratory distress (mild distress in morning). He has rales. He exhibits tenderness (hx of fractured ribs).   After procedures / chest tube, he began breathing much easier, and was able to go from BIPAP, to nasal cannula.    Abdominal: Soft. There is no tenderness. There is no rebound and no guarding.   Obese, but soft   Musculoskeletal:   Large, obese legs, bilaterally.  Trace edema, but not significant or clearly pitting.    Swelling to right elbow.   Neurological: He is alert and oriented to person, place, and time.   Skin: Skin is warm and dry. He is not " diaphoretic.   With hemosiderin staining, bilaterally...  (pt notes unchanged for years)   Psychiatric: Mood and memory normal.       Respiratory:     Respiration: 19, Pulse Oximetry: 96 %, O2 Daily Delivery Respiratory : Silicone Nasal Cannula    Chest Tube Drains:    Recent Labs      11/07/18   0244   ISTATAPH  7.465   ISTATAPCO2  41.1*   ISTATAPO2  67   ISTATATCO2  31   [id number]  94   ISTATARTHCO3  29.6*   ISTATARTBE  5*   ISTATTEMP  see below   ISTATFIO2  30   ISTATSPEC  Arterial       HemoDynamics:  Pulse: 72, Heart Rate (Monitored): 73 NIBP: 104/54      Neuro:  Alert and oriented.     Fluids:    Date 11/08/18 0700 - 11/09/18 0659   Shift 0282-4796 3472-7879 2451-9774 24 Hour Total   I  N  T  A  K  E   P.O. 500   500      P.O. 500   500    Shift Total 500   500   O  U  T  P  U  T   Urine 375   375      Output (mL) (Urethral Catheter Temperature probe 16 Fr.) 375   375    Chest Tube 10   10      Output (mL) (Chest Tube Right Pleural) 10   10    Shift Total 385   385      115          Intake/Output Summary (Last 24 hours) at 11/08/18 1057  Last data filed at 11/08/18 1000   Gross per 24 hour   Intake          1893.33 ml   Output             4630 ml   Net         -2736.67 ml       Weight: (!) 153.8 kg (339 lb 1.1 oz)  Body mass index is 40.2 kg/m².    Recent Labs      11/06/18 2257 11/07/18 1213 11/08/18   0519   SODIUM  134*  135  133*   POTASSIUM  4.7  4.8  4.7   CHLORIDE  99  100  101   CO2  29  26  25   BUN  32*  32*  40*   CREATININE  1.28  1.41*  1.43*   MAGNESIUM   --   2.1  2.2   CALCIUM  9.2  9.4  9.0       GI/Nutrition:  Recent Labs      11/06/18 2257 11/07/18   1213  11/08/18   0519   ALTSGPT  23  22  18   ASTSGOT  25  21  21   ALKPHOSPHAT  118*  118*  106*   TBILIRUBIN  0.7  0.9  0.7   AMYLASE   --   27   --    GLUCOSE  153*  176*  167*       Heme:  Recent Labs      11/06/18 2257 11/07/18   0800  11/07/18   1213  11/08/18   0519   RBC  4.67*   --   4.89  4.31*   HEMOGLOBIN  8.6*    --   9.2*  8.0*   HEMATOCRIT  33.4*   --   34.6*  31.0*   PLATELETCT  290   --   322  283   PROTHROMBTM  38.0*  36.9*   --   16.7*   APTT  57.9*   --    --    --    INR  3.88*  3.73*   --   1.34*       Infectious Disease:  Monitored Temp 2  Av.2 °C (98.9 °F)  Min: 37 °C (98.6 °F)  Max: 37.4 °C (99.3 °F)  Temp  Av.1 °C (98.7 °F)  Min: 37 °C (98.6 °F)  Max: 37.2 °C (99 °F)  Recent Labs      187  18   0934  18   1213  18   0519   WBC  20.3*   --   22.0*  24.7*   NEUTSPOLYS  85.20*   --   92.00*  89.70*   LYMPHOCYTES  6.10*   --   1.60*  3.00*   MONOCYTES  6.10   --   1.40  5.40   EOSINOPHILS  1.70  2  0.20  0.00   BASOPHILS  0.00   --   0.20  0.40   ASTSGOT  25   --   21  21   ALTSGPT  23   --   22  18   ALKPHOSPHAT  118*   --   118*  106*   TBILIRUBIN  0.7   --   0.9  0.7       Meds:  • furosemide  20 mg     • Respiratory Care per Protocol       • senna-docusate  2 Tab      And   • polyethylene glycol/lytes  1 Packet      And   • magnesium hydroxide  30 mL      And   • bisacodyl  10 mg     • insulin lispro  1-6 Units      And   • glucose 4 g  16 g      And   • dextrose 50%  25 mL     • albuterol  2 Puff     • amiodarone  200 mg     • budesonide-formoterol  2 Puff     • calcium carbonate  500 mg     • ferrous sulfate  325 mg     • guaiFENesin LA  600 mg     • lisinopril  2.5 mg     • metoprolol SR  25 mg     • oxyCODONE-acetaminophen  1 Tab     • rosuvastatin  40 mg     • spironolactone  25 mg     • ascorbic acid  500 mg     • cefepime  2 g Stopped (18 0511)   • ipratropium-albuterol  3 mL     • predniSONE  40 mg     • ipratropium-albuterol  3 mL          Procedures:  Thoracentesis, right - 2018   Chest tube, right (surgery) - 2018     Imaging:  DX-CHEST-2 VIEWS   Final Result      Pulmonary edema is improved compared to prior.      Trace bilateral pleural effusions.      Stable cardiomegaly.      Right chest tube with the side-port at the level of the soft  tissues.      Small right apical pneumothorax.      CT-CHEST (THORAX) W/O   Final Result      Small right pneumothorax. Right chest tube terminates in the right middle lobe.      Multifocal patchy and groundglass opacities bilaterally, most prominent in the right middle and right lower lobes. Findings may represent edema or pneumonia. Pulmonary contusion is thought to be less likely. Follow-up to radiographic resolution    recommended.      Trace pleural fluid on the right.      Pleural thickening and nodularity in the posterolateral right thorax deep to the right-sided rib fractures.      Fractures of the right fifth, sixth, seventh, eighth and ninth ribs.      Cardiomegaly.      Atherosclerotic plaque.      Mildly prominent mediastinal lymph nodes are not significantly changed.      Splenomegaly.                  DX-CHEST-PORTABLE (1 VIEW)   Final Result         1. Tiny right apical pneumothorax status post thoracentesis.   2. Significant decrease in right effusion, with reexpansion of the right lung.   3. Diffuse interstitial edema. Small left effusion.      DX-ELBOW-COMPLETE 3+ RIGHT   Final Result      1.  No evidence of acute fracture or dislocation.      2.  Soft tissue swelling.      3.  Mild degenerative change.      4.  Joint effusion.      DX-CHEST-PORTABLE (1 VIEW)   Final Result      1.  Cardiomegaly with pulmonary edema.      2.  Large right pleural effusion with right lung base atelectasis.      DX-CHEST-LIMITED (1 VIEW)    (Results Pending)   EC-ECHOCARDIOGRAM COMPLETE W/O CONT    (Results Pending)   DX-CHEST-LIMITED (1 VIEW)    (Results Pending)       Problem and Plan:    Pneumonia due to infectious organism   Assessment & Plan    Possible pneumonia / parapneumonia effusion (in addition to bleeding).   Due to recent admissions, covering for possible HAP/HCAP.   Had been started on Cefepime and Vancomycin.   Continue for now.   Getting new blood and sputum cultures.     Chronic obstructive pulmonary  disease with acute exacerbation (HCC)   Assessment & Plan    Please refer to acute on chronic respiratory failure assessment and plan     * Acute on chronic respiratory failure (HCC)   Assessment & Plan    -Continued difficulty / resp.failure, while on BIPAP.   -CXR with pleural effusion on right.   -Thoracentesis (dx / low-volume) done 11/7/2018 with bloody effusion.   -chest tube placed by surgery - 11/7/2018   -Improved breathing, after large volume taken off of effusion.   -Able to transition to oxygen / nasal cannula    - due to large fluid loss, stopped lasix - will restart        At lasix 20, PO, BID   - RT - duonebs, and scheduled symbicort  - continued on his ACE inhibitor, beta-blocker, Aldactone, and Crestor.  -RT protocol implemented, scheduled DuoNeb treatments  -stopped solumedrol.  Changed to prednisone 40, PO, daily, x 5 d.  - Blood cultures and urine cultures ordered.    - Patient received vancomycin and cefepime for possible concern of pneumonia.       Will stop vanc, but continue cefepime, for now.        Heart failure with preserved ejection fraction, borderline, class IV (HCC)   Assessment & Plan    Please refer to acute on chronic respiratory failure assessment and plan  -getting new echo for comparison, and change in status.   (last echo with good EF, but change in status).      Paroxysmal atrial fibrillation (HCC)   Assessment & Plan    Patient on warfarin as outpatient basis.  Held for now, given his recent procedures.  -Fecal occult stool sample ordered     Elevated troponin   Assessment & Plan    Patient has not troponin level of 0.14.  Patient had a history of slightly elevated troponin levels in the past.  This could be due to his CHF.  EKG did not reveal any ST segment changes and patient is not complaining of any chest pain or chest discomfort.  -Trend troponins lower than before, will stop.       Microcytic anemia   Assessment & Plan    Patient has a microcytic anemia, MCV is 71.5.   This is due to iron deficiency, red cell distribution width is elevated and last iron panel consistent with iron deficiency.  Patient has been on ferrous sulfate as outpatient and has showed some slight improvement in his hemoglobin.  Plan  -Once infection has been ruled out day team to consider IV infusion of iron  - Patient's home dose of ferrous sulfate continued, vitamin C added for possible absorption issues  - - Currently with improving Hgb level.   - - CTM.        Leukocytosis   Assessment & Plan    Patient has a leukocytosis, this appears to be a chronic issue dating back several months.  -Flow cytometry  (from admission)  - uncertain if was on recurrent steroids.       Also with sudden increase in Cr from 1.28, to 1.43.   From 1.28 to 1.41 in few hours, then 1.43   Not quite at cut-off for LINSEY.   Patient now eating/drinking well.   Will hold off on additional fluids, given his effusion issues.   Will monitor for now.       DISPO:  Transfer to Floor.     CODE STATUS:  full    Quality Measures:  Ecballos Catheter:    No   DVT Prophylaxis:        Hold heparin and coumadin, due to procedures.         Can reconsider after a couple days.      On scd  Ulcer Prophylaxis:   no  Antibiotics:   Yes - possible pneumonia  Lines:   Peripheral       A computerized dictation system may have been used for this note.    Despite review, there may be some spelling or grammatical errors.    Obey Oconnor M.D.  11/8/2018   Attending:  Cesar Peace M.D.

## 2018-11-08 NOTE — CONSULTS
11/8/2018    Reason for consultation: Right arm swelling    Consultation on Joshua Medrano at the request of Dr. Oconnor for right arm swelling.  The patient is a 52 y.o. male who presents with a focal area of right arm swelling due to fall.  Radiographs are negative, and he does not have significant pain.  Patient denies numbness, paresthesias, loss of consciousness or other symptoms.  He is anticoagulated, and his INR has been elevated.    Past Medical History:   Diagnosis Date   • ASTHMA    • Atrial fibrillation (HCC)    • CAD (coronary artery disease)    • Chronic obstructive pulmonary disease (HCC)    • Congestive heart failure (HCC)    • Diabetes        Past Surgical History:   Procedure Laterality Date   • COLONOSCOPY - ENDO N/A 7/25/2018    Procedure: COLONOSCOPY - ENDO;  Surgeon: Josiah Molina D.O.;  Location: ENDOSCOPY [facility];  Service: Gastroenterology   • THORACOSCOPY Left 1/25/2018    Procedure: THORACOSCOPY- PLEURODESIS ;  Surgeon: Chandu Paez M.D.;  Location: SURGERY [facility];  Service: General   • MULTIPLE CORONARY ARTERY BYPASS ENDO VEIN HARVEST  12/22/2017    Procedure: MULTIPLE CORONARY ARTERY BYPASS ENDO VEIN HARVEST X2;  Surgeon: Kiel Ash M.D.;  Location: SURGERY [facility];  Service: Cardiothoracic   • JIM  12/22/2017    Procedure: JIM;  Surgeon: Kiel Ash M.D.;  Location: SURGERY [facility];  Service: Cardiothoracic   • OTHER ABDOMINAL SURGERY         Medications  No current facility-administered medications on file prior to encounter.      Current Outpatient Prescriptions on File Prior to Encounter   Medication Sig Dispense Refill   • oxyCODONE-acetaminophen (PERCOCET) 5-325 MG Tab Take 1-2 Tabs by mouth every four hours as needed for Severe Pain for up to 7 days. 30 Tab 0   • oxyCODONE-acetaminophen (PERCOCET) 5-325 MG Tab Take 1-2 Tabs by mouth every four hours as needed for up to 7 days. 30 Tab 0   • ciprofloxacin (CIPRO) 500 MG Tab Take 1  Tab by mouth 2 times a day. 20 Tab 0   • calcium carbonate (TUMS) 500 MG Chew Tab Take 1 Tab by mouth 2 times a day as needed (Reflux). 30 Tab 0   • albuterol 108 (90 Base) MCG/ACT Aero Soln inhalation aerosol Inhale 2 Puffs by mouth every 6 hours as needed for Shortness of Breath. 1 Inhaler 15   • guaiFENesin LA (MUCINEX) 600 MG TABLET SR 12 HR Take 1 Tab by mouth every 12 hours. 28 Tab 5   • ferrous sulfate 325 (65 Fe) MG tablet Take 1 Tab by mouth every morning with breakfast. 30 Tab 3   • acetaminophen (TYLENOL) 500 MG Tab Take 2 Tabs by mouth every 8 hours. 30 Tab 0   • furosemide (LASIX) 40 MG Tab Take 1 Tab by mouth every day. Start after 7 days, 40 BID for 7 days 30 Tab 2   • tiotropium (SPIRIVA) 18 MCG Cap Inhale 1 Cap by mouth every day. 30 Cap 5   • potassium chloride SA (K-DUR) 10 MEQ Tab CR Take 1 Tab by mouth every day. Please take after 7 days with Lasix daily 30 Tab 1   • predniSONE (DELTASONE) 10 MG Tab Take 1 Tab by mouth every day for 12 days. Take 60mg x 2 days, 50mg x2 days, 40mg x2 days, 30 mg x2 days, 20mg x2 days, 10mg x2 days 42 Tab 0   • ipratropium-albuterol (DUONEB) 0.5-2.5 (3) MG/3ML nebulizer solution 3 mL by Nebulization route every 6 hours as needed for Shortness of Breath. 180 Bullet 10   • warfarin (COUMADIN) 5 MG Tab Take 1 Tab by mouth COUMADIN-DAILY. 30 Tab 0   • budesonide-formoterol (SYMBICORT) 160-4.5 MCG/ACT Aerosol Inhale 2 Puffs by mouth 2 Times a Day.     • rosuvastatin (CRESTOR) 40 MG tablet Take 40 mg by mouth every day.     • lisinopril (PRINIVIL) 2.5 MG Tab Take 1 Tab by mouth every day. 30 Tab 3   • metoprolol SR (TOPROL XL) 25 MG TABLET SR 24 HR Take 1 Tab by mouth every day. 30 Tab 3   • spironolactone (ALDACTONE) 25 MG Tab Take 1 Tab by mouth every day. 30 Tab 3   • amiodarone (CORDARONE) 200 MG Tab Take 1 Tab by mouth every day. 90 Tab 3       Allergies  Cillins [penicillins]; Erythromycin; Metoprolol; and Shellfish allergy    ROS  Per HPI. All other systems were  "reviewed and found to be negative    Family History   Problem Relation Age of Onset   • Diabetes Mother    • Stroke Mother    • Leukemia Mother    • Diabetes Father    • No Known Problems Sister    • Heart Disease Brother         PPM   • Lung Disease Brother    • Alcohol/Drug Brother    • Diabetes Sister        Social History     Social History   • Marital status:      Spouse name: N/A   • Number of children: N/A   • Years of education: N/A     Social History Main Topics   • Smoking status: Current Some Day Smoker     Packs/day: 0.00     Years: 30.00     Types: Cigarettes   • Smokeless tobacco: Never Used      Comment: 1/2-1.5 packs for 30 years   • Alcohol use No   • Drug use: No   • Sexual activity: Not on file     Other Topics Concern   • Not on file     Social History Narrative   • No narrative on file       Physical Exam  Vitals  Blood pressure 126/68, pulse 78, temperature 37.2 °C (99 °F), resp. rate 14, height 1.956 m (6' 5.01\"), weight (!) 156.5 kg (345 lb 0.3 oz), SpO2 96 %.  General: Well Developed, Well Nourished, no acute distress  Psychiatric: Alert and oriented x3, appropriate responses to questions, pleasant mood and affect.  HEENT: Normocephalic, atraumatic  Eyes: Anicteric, PERRLA, EOMI  Neck: Supple, nontender, no masses  Chest: Symmetric expansion of the chest wall, non-tender to palpation, no distress.  Heart: RRR, palpable peripheral pulses  Abdomen: Soft, NT, ND  Skin: Intact, no open wounds  Extremities: Area of focal swelling right posterior arm/elbow.  No tension of skin.  Neuro: Intact light touch and motor median/radial/ulnar right arm  Vascular: 2+ radial pulse on right, Capillary refill <2 seconds    Radiographs:  CT-CHEST (THORAX) W/O   Final Result      Small right pneumothorax. Right chest tube terminates in the right middle lobe.      Multifocal patchy and groundglass opacities bilaterally, most prominent in the right middle and right lower lobes. Findings may represent edema " or pneumonia. Pulmonary contusion is thought to be less likely. Follow-up to radiographic resolution    recommended.      Trace pleural fluid on the right.      Pleural thickening and nodularity in the posterolateral right thorax deep to the right-sided rib fractures.      Fractures of the right fifth, sixth, seventh, eighth and ninth ribs.      Cardiomegaly.      Atherosclerotic plaque.      Mildly prominent mediastinal lymph nodes are not significantly changed.      Splenomegaly.                  DX-CHEST-PORTABLE (1 VIEW)   Final Result         1. Tiny right apical pneumothorax status post thoracentesis.   2. Significant decrease in right effusion, with reexpansion of the right lung.   3. Diffuse interstitial edema. Small left effusion.      DX-ELBOW-COMPLETE 3+ RIGHT   Final Result      1.  No evidence of acute fracture or dislocation.      2.  Soft tissue swelling.      3.  Mild degenerative change.      4.  Joint effusion.      DX-CHEST-PORTABLE (1 VIEW)   Final Result      1.  Cardiomegaly with pulmonary edema.      2.  Large right pleural effusion with right lung base atelectasis.      DX-CHEST-LIMITED (1 VIEW)    (Results Pending)   EC-ECHOCARDIOGRAM COMPLETE W/O CONT    (Results Pending)       Laboratory Values  Recent Labs      11/06/18 2257 11/07/18   1213   WBC  20.3*  22.0*   RBC  4.67*  4.89   HEMOGLOBIN  8.6*  9.2*   HEMATOCRIT  33.4*  34.6*   MCV  71.5*  70.8*   MCH  18.4*  18.8*   MCHC  25.7*  26.6*   RDW  69.1*  68.7*   PLATELETCT  290  322   MPV   --   9.9     Recent Labs      11/06/18 2257 11/07/18   1213   SODIUM  134*  135   POTASSIUM  4.7  4.8   CHLORIDE  99  100   CO2  29  26   GLUCOSE  153*  176*   BUN  32*  32*     Recent Labs      11/06/18 2257 11/07/18   0800   APTT  57.9*   --    INR  3.88*  3.73*         Impression:    #1 Right posterior elbow/arm hematoma    Plan:    Non-operative management.  I would suggest compressive ACE wraps and correction of his INR.  No surgery  anticipated.

## 2018-11-08 NOTE — HEART FAILURE PROGRAM
Cardiovascular Nurse Navigator () Advanced Heart Failure Program Inpatient Progress Note:    This patient has been given 12 appointments at the HF clinic. He has only completed 3 of those. 2 no-shows and the rest cancellations.     He has been blocked completely at the discharge clinic for too many no-shows.    Mr. Medrano is admitted and/or visits the ER at least monthly. SW has offered him information on MTM and provided an Uber ride home on the previous admission.    When asked how he will get to his follow up appointments, patient states that his daughter will help.    Chart review does not reveal a prior discussion from palliative about goals of care or advance care planning. I have messaged Dr. Oconnor making him aware of this.     Speaking with patients frankly about their end-of-life wishes is one of the most important things a palliative care team can do. This is especially important in the context of heart failure, since it’s such an unpredictable disease.    SW note today indicates that they are aware that patient doesn't go to appointments but the plan is still to try to make appointments and see if they are granted them?    I have re-ordered social work requesting that they address why patient does not attend appointments. This patient's multiple, chronic conditions cannot be managed if he does not participate and be a partner in his own care which involves going to appointments.    Thank you and please call DILIP Vazquez with questions M-F

## 2018-11-08 NOTE — PROGRESS NOTES
Pharmacy Kinetics Addendum:    52 y.o. male on vancomycin day # 1 11/7/2018    Currently post vancomycin loading dose    Indication for Treatment: pneumonia with pleural effusion    Recent Labs      11/06/18   2257  11/07/18   1213   BUN  32*  32*   CREATININE  1.28  1.41*   ALBUMIN  3.3  3.2     Recent Labs      11/07/18   1213   VANCORANDOM  28.2       A/P   1. Vancomycin dose change: Not indicated  2. Next vancomycin level: 11/8 w/ AM labs  3. Goal trough: 16-20 mcg/mL  4. Comments: Random 12 hour level checked and remains elevated. Level with AM labs ordered. Likely will require q24 hour dosing. Thoracentesis and chest tube placement today demonstrate hemothorax. Fluid cultures sent. Recommend rapid de-escalation. Urine output remains adequate.     Regino Goetz, PharmD

## 2018-11-08 NOTE — PROGRESS NOTES
Patient transferred to the floor, placed on tele box, and monitor room notified. Patient has complaints of Rt side pain 6/10, will medicate according to MAR, and no indications of distress. Bed in the lowest position and call light and personal belongings within reach.

## 2018-11-08 NOTE — RESPIRATORY CARE
COPD EDUCATION by COPD CLINICAL EDUCATOR  11/8/2018 at 1:28 PM by Gila Patten     Patient interviewed by COPD education team. Patient refused COPD program at this time.

## 2018-11-08 NOTE — CARE PLAN
Problem: Respiratory:  Goal: Respiratory status will improve  Weaned off BiPAP to 2 L NC after thoracentesis.    Problem: Fluid Volume:  Goal: Will maintain balanced intake and output  2810 mL out of right chest tube since insertion.

## 2018-11-08 NOTE — PROGRESS NOTES
[facility] ICU Transfer Note                                                                                         ICU Admit Date: 11/6/2018     ICU Discharge Date:  11/08/18       (transferred to floor)        (signed-out to [facility]-Dr. Jaycee Garcia)     Primary Diagnosis:   Acute on chronic respiratory failure (HCC)      ICU Course Summary (Brief Narrative):  52 y.o. Male with Hx of copD (2 L at home), CHF (EF-55%), parox-A-fib, CABG, DLD, and DM-2.  He presented for worsening SOB x 2 days, then respiratory failure, and admission to ICU.  ...  Also with recent past hospital discharge (10/31/18), for Resp.failure, from COPD exacerbation.  On that visit, had fall from EMS gurney, with rib fractures (right 5-8) and right elbow swelling.       Presented for worsened SOB and oxygen/BIPAP requirements.    Had thoracentesis, then chest tube placed on 11/7/18.  After a transient decrease in BP, due to large amount of fluid loss into chest tube, he stabilized, and then noted better breathing, and less oxygen requirement.      Recent Events:  Improved oxygenation.  On nasal cannula, at 2L O2 (home dose).  Breathing well.  Did well last night, without needing BIPAP.  States he has better breathing.  Mild right rib pains.   Denies acute changes (other than improvement).   States he feels better than he has for weeks.     Consultants:  ICU:  Dr. Eller,  Dr. Peace  Surgery:  Dr. Zavaleta  (chest-tube)       Important Events in the ICU:   - Central Line:  None   - Intubation:  None   - Pressors:  None   - Ceballos catheter: Yes, but since removed.   - Tube feeding: None  - Antibiotics: Yes, concern for PNA.  - Other procedures: thoracentesis and chest tube, both on 11/7/18        Labs and imaging studies to be continued with their indications:  - CBC: Yes;  (leukocytosis)  - CMP or BMP: Yes  (sodium and creatinine)  - Magnesium: Occasional  - Phosphorus: No   - Chest Xray: Yes (at some point) for comparison film, or if needed  clinically.   - Other studies: None         Things to follow:   -  Morning labs - wbc, na, cr....  -  Chest tube (likely can come-out tomorrow - 11/9/18)  -  Breathing status, and oxygen requirements.       A computerized dictation system may have been used for this note.    Despite review, there may be some spelling or grammatical errors.    Obey Oconnor M.D.  11/8/2018   Attending:  Cesar Peace M.D.

## 2018-11-08 NOTE — PROGRESS NOTES
Report given to Irene COPELAND. Transferred from T628 to T715-1 on telemonitor with wife and this RN via wheelchair at 1200.

## 2018-11-08 NOTE — NON-PROVIDER
"[facility] ICU Progress Note      Admit Date: 11/6/2018  ICU Day: 3    Resident(s): Rayne Monaco  Attending: SHIVAM KAISER/ Dr. Peace    Date & Time:   11/8/2018   7:44 AM       Patient ID:    Name:             Joshua Medrano   YOB: 1965  Age:                 52 y.o.  male   MRN:               [id number]    HPI:  Mr. Peace is 52-year-old male who came to the hospital 11/6/18 for complaints of worsening shortness of breath.  He has a prior medical history of COPD, HFpEF, CHF, paroxysmal atrial fibrillation, CAD post CABG, rib fractures, respiratory failure, leukocytosis and dyslipidemia.  He was recently released from the hospital on 10/31/18 for COPD exacerbation and rib fractures with small pneumothorax.        Consultants:  Surgery:  Dr Zavaleta  PMA: Dr. Peace    Interval Events:  Overnight Mr. Medrano had no acute events.  He is s/p chest tube placement and CT thorax on 11/7.    Review of Systems   Constitutional: Negative for chills, diaphoresis, fever and malaise/fatigue.   HENT: Negative for congestion and sore throat.    Eyes: Negative for blurred vision and double vision.   Respiratory: Positive for cough and sputum production. Negative for shortness of breath.    Cardiovascular: Positive for chest pain and leg swelling. Negative for palpitations and orthopnea.   Gastrointestinal: Negative for abdominal pain, diarrhea, nausea and vomiting.   Genitourinary: Negative for urgency.   Musculoskeletal: Positive for joint pain and myalgias.   Skin: Negative for rash.   Neurological: Negative for dizziness and headaches.   Psychiatric/Behavioral: Negative for depression.       PHYSICAL EXAM  Vitals:    11/08/18 0441 11/08/18 0500 11/08/18 0600 11/08/18 0652   BP:       Pulse: 85 78 79    Resp: (!) 25 17 19    Temp:       SpO2: 96% 93% 97% 97%   Weight:       Height:         Body mass index is 40.2 kg/m².  /68   Pulse 79   Temp 37 °C (98.6 °F)   Resp 19   Ht 1.956 m (6' 5.01\")   Wt (!) " 153.8 kg (339 lb 1.1 oz)   SpO2 97%   BMI 40.20 kg/m²   O2 therapy: Pulse Oximetry: 97 %, O2 (LPM): 2, O2 Delivery: Silicone Nasal Cannula    Physical Exam   Constitutional: He is oriented to person, place, and time and well-developed, well-nourished, and in no distress. No distress.   HENT:   Head: Normocephalic and atraumatic.   Right Ear: External ear normal.   Eyes: Pupils are equal, round, and reactive to light. Conjunctivae are normal. Right eye exhibits no discharge. Left eye exhibits no discharge.   Neck: No tracheal deviation present.   Cardiovascular: Normal rate and regular rhythm.    Murmur heard.  Pulmonary/Chest: Effort normal. No respiratory distress. He exhibits tenderness.   Coarse breath sounds right middle and lower lobes   Abdominal: Soft. Bowel sounds are normal. He exhibits no distension. There is no tenderness. There is no rebound and no guarding.   Musculoskeletal: He exhibits edema.   Limited ROM R upper arm d/t pain   Neurological: He is alert and oriented to person, place, and time. No cranial nerve deficit. GCS score is 15.   Skin: Skin is warm and dry. He is not diaphoretic. There is erythema.   Erythema to BLE   Psychiatric: Mood, affect and judgment normal.       Respiratory:     Respiration: 19, Pulse Oximetry: 97 %, O2 Daily Delivery Respiratory : Silicone Nasal Cannula    Chest Tube Drains:    Recent Labs      11/07/18   0244   ISTATAPH  7.465   ISTATAPCO2  41.1*   ISTATAPO2  67   ISTATATCO2  31   EAYYDRT8CUZ  94   ISTATARTHCO3  29.6*   ISTATARTBE  5*   ISTATTEMP  see below   ISTATFIO2  30   ISTATSPEC  Arterial       HemoDynamics:  Pulse: 79, Heart Rate (Monitored): 76 NIBP: 100/48      Neuro:      Fluids:        Intake/Output Summary (Last 24 hours) at 11/08/18 0744  Last data filed at 11/08/18 0600   Gross per 24 hour   Intake          1543.33 ml   Output             4810 ml   Net         -3266.67 ml       Weight: (!) 153.8 kg (339 lb 1.1 oz)  Body mass index is 40.2  kg/m².    Recent Labs      18   SODIUM  134*  135  133*   POTASSIUM  4.7  4.8  4.7   CHLORIDE  99  100  101   CO2  29  26  25   BUN  32*  32*  40*   CREATININE  1.28  1.41*  1.43*   MAGNESIUM   --   2.1  2.2   CALCIUM  9.2  9.4  9.0       GI/Nutrition:  Recent Labs      18   ALTSGPT     ASTSGOT  25     ALKPHOSPHAT  118*  118*  106*   TBILIRUBIN  0.7  0.9  0.7   AMYLASE   --   27   --    GLUCOSE  153*  176*  167*       Heme:  Recent Labs      18   0800  18   RBC  4.67*   --   4.89  4.31*   HEMOGLOBIN  8.6*   --   9.2*  8.0*   HEMATOCRIT  33.4*   --   34.6*  31.0*   PLATELETCT  290   --   322  283   PROTHROMBTM  38.0*  36.9*   --   16.7*   APTT  57.9*   --    --    --    INR  3.88*  3.73*   --   1.34*       Infectious Disease:  Monitored Temp 2  Av.2 °C (98.9 °F)  Min: 36.9 °C (98.4 °F)  Max: 37.4 °C (99.3 °F)  Temp  Av.1 °C (98.7 °F)  Min: 37 °C (98.6 °F)  Max: 37.2 °C (99 °F)  Recent Labs      18   0934  18   WBC  20.3*   --   22.0*  24.7*   NEUTSPOLYS  85.20*   --   92.00*  89.70*   LYMPHOCYTES  6.10*   --   1.60*  3.00*   MONOCYTES  6.10   --   1.40  5.40   EOSINOPHILS  1.70  2  0.20  0.00   BASOPHILS  0.00   --   0.20  0.40   ASTSGOT  25   --   21  21   ALTSGPT  23   --   22  18   ALKPHOSPHAT  118*   --   118*  106*   TBILIRUBIN  0.7   --   0.9  0.7       Meds:  • Respiratory Care per Protocol       • senna-docusate  2 Tab      And   • polyethylene glycol/lytes  1 Packet      And   • magnesium hydroxide  30 mL      And   • bisacodyl  10 mg     • insulin lispro  1-6 Units      And   • glucose 4 g  16 g      And   • dextrose 50%  25 mL     • albuterol  2 Puff     • amiodarone  200 mg     • budesonide-formoterol  2 Puff     • calcium carbonate  500 mg     • ferrous sulfate  325 mg     •  guaiFENesin LA  600 mg     • lisinopril  2.5 mg     • metoprolol SR  25 mg     • oxyCODONE-acetaminophen  1 Tab     • rosuvastatin  40 mg     • spironolactone  25 mg     • ascorbic acid  500 mg     • cefepime  2 g Stopped (11/08/18 0511)   • MD Alert...Vancomycin per Pharmacy       • ipratropium-albuterol  3 mL     • predniSONE  40 mg     • ipratropium-albuterol  3 mL          Procedures:  None    Imaging:  CT-CHEST (THORAX) W/O   Final Result      Small right pneumothorax. Right chest tube terminates in the right middle lobe.      Multifocal patchy and groundglass opacities bilaterally, most prominent in the right middle and right lower lobes. Findings may represent edema or pneumonia. Pulmonary contusion is thought to be less likely. Follow-up to radiographic resolution    recommended.      Trace pleural fluid on the right.      Pleural thickening and nodularity in the posterolateral right thorax deep to the right-sided rib fractures.      Fractures of the right fifth, sixth, seventh, eighth and ninth ribs.      Cardiomegaly.      Atherosclerotic plaque.      Mildly prominent mediastinal lymph nodes are not significantly changed.      Splenomegaly.                  DX-CHEST-PORTABLE (1 VIEW)   Final Result         1. Tiny right apical pneumothorax status post thoracentesis.   2. Significant decrease in right effusion, with reexpansion of the right lung.   3. Diffuse interstitial edema. Small left effusion.      DX-ELBOW-COMPLETE 3+ RIGHT   Final Result      1.  No evidence of acute fracture or dislocation.      2.  Soft tissue swelling.      3.  Mild degenerative change.      4.  Joint effusion.      DX-CHEST-PORTABLE (1 VIEW)   Final Result      1.  Cardiomegaly with pulmonary edema.      2.  Large right pleural effusion with right lung base atelectasis.      DX-CHEST-LIMITED (1 VIEW)    (Results Pending)   EC-ECHOCARDIOGRAM COMPLETE W/O CONT    (Results Pending)   DX-CHEST-LIMITED (1 VIEW)    (Results Pending)  "  DX-CHEST-2 VIEWS    (Results Pending)       Problem and Plan:   Acute on chronic respiratory failure  Differentials include:  Pleural effusion, pneumonia, COPD exacerbation, CHF exacerbation  BiPAP as needed, respiratory protocol  Improved post chest tube placement, currently on 2L NC  PO steroids    Pneumonia - acute     \"Patchy and groundglass opacities bilaterally, most prominent in the right middle and right lower lobes\" by     CT yesterday.       Elevated WBC, need sputum culture      Aggressive pulmonary toilet, currently 1500 on IS     PO steroids     Currently on broad spectrum antibiotic, will deescalate as appropriate       Pleural effusion - acute  Large R pleural effusion.    Thoracentesis done, sample obtained.  Chest tube placed 11/7 - approx 2.5 L obtained  Post tube placement and evacuation, reexpansion pulmonary edema  LDH elevated - exudative  Differentials include: pneumonia, hemothorax; likely infection     Right sided rib fractures  Rib fractures 5-9  Pain improved from previous admission  Able to pull 1500 on IS currently  Chest tube now in place on R side  Aggressive pulmonary toilet, respiratory protocol    COPD - chronic  Wears 2-3 L O2 at night baseline  No hypercarbia on blood gas  Respiratory protocol  Continue symbicort, albuterol, PO steroid     Microcytic anemia - chronic  Hemoglobin up from last admission  Previous low iron levels  Iron sulfate at home, continue in hospital  Ascorbic acid added for absorption     Congestive heart failure - chronic  Currently not largely contributory to active problem  Continue metoprolol and spironolactone  Echocardiogram to evaluate     Elevated troponin - acute  Elevated on admission, could be related to heart strain  No c/o cardiac chest pain  Troponin stable  Echocardiogram to evaluate     Paroxysmal atrial fibrillation - chronic  Currently sinus rhythm  Continue amiodarone, anticoagulation  Warfarin dosing, supratherapeutic on admission, " restart today.     Leukocytosis - chronic  Leukocytosis on admission, present during last hospitalization  Steroids received during prior hospitalization, 1 wk ago  Received steroids this admission  Pneumonia on CT, continuing cefepime  Order sputum culture, blood cultures negative to date  Awaiting flow cytometry     CAD - chronic  Post CABG  No cardiac chest pain  Continue metoprolol, lisinopril, atorvastatin, warfarin    Assessment & plan notes cannot be loaded without a specified hospital service.      DISPO: Transfer to floor  CODE STATUS: Full     Quality Measures:  Ceballos Catheter: Yes, remove today  DVT Prophylaxis: warfarin for anticoagulation   Ulcer Prophylaxis: none  Antibiotics: yes, pneumonia  Lines: PIV

## 2018-11-08 NOTE — PROGRESS NOTES
"  Trauma/Surgical Progress Note    Author: Jin Zavaleta Date & Time created: 11/8/2018   10:37 AM     Interval Events:  Chest tube placed yesterday symptomatic pleural effusion  Now off BiPAP  He reports feeling better  breathing with ease  ROS  Hemodynamics:  Blood pressure 126/68, pulse 72, temperature 37 °C (98.6 °F), resp. rate 19, height 1.956 m (6' 5.01\"), weight (!) 153.8 kg (339 lb 1.1 oz), SpO2 96 %.     Respiratory:    Respiration: 19, Pulse Oximetry: 96 %, O2 Daily Delivery Respiratory : Silicone Nasal Cannula     Given By:: Mouthpiece, PEP/CPT Method: Positive Airway Pressure Device, Work Of Breathing / Effort: Moderate  RUL Breath Sounds: Diminished, RML Breath Sounds: Diminished, RLL Breath Sounds: Diminished, GENNARO Breath Sounds: Diminished, LLL Breath Sounds: Diminished  Fluids:    Intake/Output Summary (Last 24 hours) at 11/08/18 1037  Last data filed at 11/08/18 1000   Gross per 24 hour   Intake          1893.33 ml   Output             4630 ml   Net         -2736.67 ml     Admit Weight: (!) 160.1 kg (352 lb 15.3 oz)  Current Weight: (!) 153.8 kg (339 lb 1.1 oz)    Physical Exam  Head: No cephalohematoma. Pupils reactive No bleeding ears nose or mouth  Neck: No tracheal deviation. No stridor.  Cardiovascular: Normal rate,skin warm brisk cap refill.  Pulmonary/Chest: supplemental oxygen NC chest tube in place with no leak Bruising present  Abdominal: Soft, nondistended. Nontender to palpation.   Musculoskeletal: vascular changes lower extremities  Moving all  Back: Midline thoracic and lumbar spines are nontender to palpation. No step-offs. Mild sacral erythema present.  Neurological: GCS 15 .   Skin: Skin is warm and dry.    Psychiatric: appropriate, friendly    Medical Decision Making/Problem List:    Active Hospital Problems    Diagnosis   • Acute on chronic respiratory failure (HCC) [J96.20]     Priority: High   • Chronic obstructive pulmonary disease with acute exacerbation (HCC) [J44.1]     " Priority: High   • Pneumonia due to infectious organism [J18.9]     Priority: High   • Heart failure with preserved ejection fraction, borderline, class IV ([facility]) [I50.30]     Priority: Medium   • Paroxysmal atrial fibrillation ([facility]) [I48.0]     Priority: Medium   • CAD (coronary artery disease) [I25.10]     Priority: Medium   • Acute exacerbation of CHF (congestive heart failure) ([facility]) [I50.9]     Priority: Medium   • Elevated troponin [R74.8]     Priority: Low   • Closed fracture of multiple ribs of right side [S22.41XA]     Priority: Low   • Morbid obesity with BMI of 40.0-44.9, adult ([facility]) [E66.01, Z68.41]     Priority: Low   • Microcytic anemia [D50.9]     Priority: Low   • SHASHANK (obstructive sleep apnea) [G47.33]     Priority: Low   • Leukocytosis [D72.829]     Priority: Low     Core Measures & Quality Metrics:  Core Measures & Quality Metrics  CHUCHO Score  Discussed patient condition with RN, Patient and care team.      Assessment/Plan  No new Assessment & Plan notes have been filed under this hospital service since the last note was generated.  Service: Surgery General  Care as directed primary  continue chest tube drainage until output down  Monitoring and support  Discussed finding and plan

## 2018-11-08 NOTE — PROGRESS NOTES
Patient down to 2 view chest x ray with Yadira HURLEY RN and transport on telebox. Patient in wheelchair. Returned to room at 0905 with no complications.

## 2018-11-08 NOTE — PROGRESS NOTES
Patient able to lie flat on 2 L NC. Brought down to CT of chest with this RN and student RN on monitor. No complications.

## 2018-11-09 LAB
ALBUMIN SERPL BCP-MCNC: 3 G/DL (ref 3.2–4.9)
ALBUMIN/GLOB SERPL: 0.8 G/DL
ALP SERPL-CCNC: 101 U/L (ref 30–99)
ALT SERPL-CCNC: 23 U/L (ref 2–50)
ANION GAP SERPL CALC-SCNC: 8 MMOL/L (ref 0–11.9)
AST SERPL-CCNC: 24 U/L (ref 12–45)
BASOPHILS # BLD AUTO: 0.4 % (ref 0–1.8)
BASOPHILS # BLD: 0.09 K/UL (ref 0–0.12)
BILIRUB SERPL-MCNC: 0.6 MG/DL (ref 0.1–1.5)
BUN SERPL-MCNC: 41 MG/DL (ref 8–22)
CALCIUM SERPL-MCNC: 8.6 MG/DL (ref 8.5–10.5)
CHLORIDE SERPL-SCNC: 99 MMOL/L (ref 96–112)
CO2 SERPL-SCNC: 25 MMOL/L (ref 20–33)
COMMENT 1642: NORMAL
CREAT SERPL-MCNC: 1.36 MG/DL (ref 0.5–1.4)
EOSINOPHIL # BLD AUTO: 0.08 K/UL (ref 0–0.51)
EOSINOPHIL NFR BLD: 0.4 % (ref 0–6.9)
ERYTHROCYTE [DISTWIDTH] IN BLOOD BY AUTOMATED COUNT: 69 FL (ref 35.9–50)
GLOBULIN SER CALC-MCNC: 3.7 G/DL (ref 1.9–3.5)
GLUCOSE BLD-MCNC: 171 MG/DL (ref 65–99)
GLUCOSE BLD-MCNC: 186 MG/DL (ref 65–99)
GLUCOSE BLD-MCNC: 205 MG/DL (ref 65–99)
GLUCOSE SERPL-MCNC: 110 MG/DL (ref 65–99)
HCT VFR BLD AUTO: 31 % (ref 42–52)
HGB BLD-MCNC: 7.9 G/DL (ref 14–18)
IMM GRANULOCYTES # BLD AUTO: 0.46 K/UL (ref 0–0.11)
IMM GRANULOCYTES NFR BLD AUTO: 2.1 % (ref 0–0.9)
LDH SERPL L TO P-CCNC: 321 U/L (ref 107–266)
LYMPHOCYTES # BLD AUTO: 1.19 K/UL (ref 1–4.8)
LYMPHOCYTES NFR BLD: 5.4 % (ref 22–41)
MAGNESIUM SERPL-MCNC: 2.1 MG/DL (ref 1.5–2.5)
MCH RBC QN AUTO: 18.5 PG (ref 27–33)
MCHC RBC AUTO-ENTMCNC: 25.5 G/DL (ref 33.7–35.3)
MCV RBC AUTO: 72.4 FL (ref 81.4–97.8)
MONOCYTES # BLD AUTO: 1.38 K/UL (ref 0–0.85)
MONOCYTES NFR BLD AUTO: 6.3 % (ref 0–13.4)
MORPHOLOGY BLD-IMP: NORMAL
NEUTROPHILS # BLD AUTO: 18.69 K/UL (ref 1.82–7.42)
NEUTROPHILS NFR BLD: 85.4 % (ref 44–72)
NRBC # BLD AUTO: 0.08 K/UL
NRBC BLD-RTO: 0.4 /100 WBC
PLATELET # BLD AUTO: 216 K/UL (ref 164–446)
PMV BLD AUTO: 10.1 FL (ref 9–12.9)
POTASSIUM SERPL-SCNC: 4.6 MMOL/L (ref 3.6–5.5)
PROT SERPL-MCNC: 6.7 G/DL (ref 6–8.2)
RBC # BLD AUTO: 4.28 M/UL (ref 4.7–6.1)
SODIUM SERPL-SCNC: 132 MMOL/L (ref 135–145)
WBC # BLD AUTO: 21.9 K/UL (ref 4.8–10.8)

## 2018-11-09 PROCEDURE — 700105 HCHG RX REV CODE 258: Performed by: HOSPITALIST

## 2018-11-09 PROCEDURE — 700102 HCHG RX REV CODE 250 W/ 637 OVERRIDE(OP): Performed by: STUDENT IN AN ORGANIZED HEALTH CARE EDUCATION/TRAINING PROGRAM

## 2018-11-09 PROCEDURE — 700101 HCHG RX REV CODE 250: Performed by: STUDENT IN AN ORGANIZED HEALTH CARE EDUCATION/TRAINING PROGRAM

## 2018-11-09 PROCEDURE — 83735 ASSAY OF MAGNESIUM: CPT

## 2018-11-09 PROCEDURE — 700111 HCHG RX REV CODE 636 W/ 250 OVERRIDE (IP): Performed by: STUDENT IN AN ORGANIZED HEALTH CARE EDUCATION/TRAINING PROGRAM

## 2018-11-09 PROCEDURE — 94760 N-INVAS EAR/PLS OXIMETRY 1: CPT

## 2018-11-09 PROCEDURE — 83615 LACTATE (LD) (LDH) ENZYME: CPT

## 2018-11-09 PROCEDURE — 85025 COMPLETE CBC W/AUTO DIFF WBC: CPT

## 2018-11-09 PROCEDURE — 36415 COLL VENOUS BLD VENIPUNCTURE: CPT

## 2018-11-09 PROCEDURE — 94640 AIRWAY INHALATION TREATMENT: CPT

## 2018-11-09 PROCEDURE — 700111 HCHG RX REV CODE 636 W/ 250 OVERRIDE (IP): Performed by: HOSPITALIST

## 2018-11-09 PROCEDURE — A9270 NON-COVERED ITEM OR SERVICE: HCPCS | Performed by: STUDENT IN AN ORGANIZED HEALTH CARE EDUCATION/TRAINING PROGRAM

## 2018-11-09 PROCEDURE — 770020 HCHG ROOM/CARE - TELE (206)

## 2018-11-09 PROCEDURE — 82962 GLUCOSE BLOOD TEST: CPT

## 2018-11-09 PROCEDURE — 82232 ASSAY OF BETA-2 PROTEIN: CPT

## 2018-11-09 PROCEDURE — 99232 SBSQ HOSP IP/OBS MODERATE 35: CPT | Mod: GC | Performed by: INTERNAL MEDICINE

## 2018-11-09 PROCEDURE — 700101 HCHG RX REV CODE 250: Performed by: INTERNAL MEDICINE

## 2018-11-09 PROCEDURE — 80053 COMPREHEN METABOLIC PANEL: CPT

## 2018-11-09 PROCEDURE — 700105 HCHG RX REV CODE 258: Performed by: STUDENT IN AN ORGANIZED HEALTH CARE EDUCATION/TRAINING PROGRAM

## 2018-11-09 RX ORDER — CEFDINIR 300 MG/1
300 CAPSULE ORAL EVERY 12 HOURS
Status: DISCONTINUED | OUTPATIENT
Start: 2018-11-09 | End: 2018-11-10 | Stop reason: HOSPADM

## 2018-11-09 RX ORDER — DOXYCYCLINE 100 MG/1
100 TABLET ORAL 2 TIMES DAILY
Status: DISCONTINUED | OUTPATIENT
Start: 2018-11-09 | End: 2018-11-10 | Stop reason: HOSPADM

## 2018-11-09 RX ORDER — LIDOCAINE 50 MG/G
1 PATCH TOPICAL EVERY 24 HOURS
Status: DISCONTINUED | OUTPATIENT
Start: 2018-11-09 | End: 2018-11-10 | Stop reason: HOSPADM

## 2018-11-09 RX ADMIN — METOPROLOL SUCCINATE 25 MG: 25 TABLET, EXTENDED RELEASE ORAL at 05:02

## 2018-11-09 RX ADMIN — AMIODARONE HYDROCHLORIDE 200 MG: 200 TABLET ORAL at 05:03

## 2018-11-09 RX ADMIN — IPRATROPIUM BROMIDE AND ALBUTEROL SULFATE 3 ML: .5; 3 SOLUTION RESPIRATORY (INHALATION) at 23:48

## 2018-11-09 RX ADMIN — OXYCODONE AND ACETAMINOPHEN 1 TABLET: 5; 325 TABLET ORAL at 21:26

## 2018-11-09 RX ADMIN — STANDARDIZED SENNA CONCENTRATE AND DOCUSATE SODIUM 2 TABLET: 8.6; 5 TABLET, FILM COATED ORAL at 05:02

## 2018-11-09 RX ADMIN — CEFEPIME 2 G: 2 INJECTION, POWDER, FOR SOLUTION INTRAVENOUS at 04:59

## 2018-11-09 RX ADMIN — IPRATROPIUM BROMIDE AND ALBUTEROL SULFATE 3 ML: .5; 3 SOLUTION RESPIRATORY (INHALATION) at 15:09

## 2018-11-09 RX ADMIN — CEFEPIME 2 G: 2 INJECTION, POWDER, FOR SOLUTION INTRAVENOUS at 23:08

## 2018-11-09 RX ADMIN — SPIRONOLACTONE 25 MG: 25 TABLET ORAL at 05:03

## 2018-11-09 RX ADMIN — IPRATROPIUM BROMIDE AND ALBUTEROL SULFATE 3 ML: .5; 3 SOLUTION RESPIRATORY (INHALATION) at 01:35

## 2018-11-09 RX ADMIN — BUDESONIDE AND FORMOTEROL FUMARATE DIHYDRATE 2 PUFF: 160; 4.5 AEROSOL RESPIRATORY (INHALATION) at 17:11

## 2018-11-09 RX ADMIN — DOXYCYCLINE 100 MG: 100 TABLET ORAL at 21:25

## 2018-11-09 RX ADMIN — IPRATROPIUM BROMIDE AND ALBUTEROL SULFATE 3 ML: .5; 3 SOLUTION RESPIRATORY (INHALATION) at 11:24

## 2018-11-09 RX ADMIN — CEFEPIME 2 G: 2 INJECTION, POWDER, FOR SOLUTION INTRAVENOUS at 13:07

## 2018-11-09 RX ADMIN — FUROSEMIDE 20 MG: 20 TABLET ORAL at 05:02

## 2018-11-09 RX ADMIN — OXYCODONE HYDROCHLORIDE AND ACETAMINOPHEN 500 MG: 500 TABLET ORAL at 05:03

## 2018-11-09 RX ADMIN — FUROSEMIDE 20 MG: 20 TABLET ORAL at 16:09

## 2018-11-09 RX ADMIN — PREDNISONE 40 MG: 20 TABLET ORAL at 05:02

## 2018-11-09 RX ADMIN — IPRATROPIUM BROMIDE AND ALBUTEROL SULFATE 3 ML: .5; 3 SOLUTION RESPIRATORY (INHALATION) at 07:24

## 2018-11-09 RX ADMIN — LIDOCAINE 1 PATCH: 50 PATCH TOPICAL at 09:45

## 2018-11-09 RX ADMIN — ALBUTEROL SULFATE 2 PUFF: 90 AEROSOL, METERED RESPIRATORY (INHALATION) at 21:37

## 2018-11-09 RX ADMIN — CEFDINIR 300 MG: 300 CAPSULE ORAL at 21:24

## 2018-11-09 RX ADMIN — ROSUVASTATIN CALCIUM 40 MG: 20 TABLET, FILM COATED ORAL at 05:03

## 2018-11-09 RX ADMIN — BUDESONIDE AND FORMOTEROL FUMARATE DIHYDRATE 2 PUFF: 160; 4.5 AEROSOL RESPIRATORY (INHALATION) at 05:03

## 2018-11-09 RX ADMIN — IPRATROPIUM BROMIDE AND ALBUTEROL SULFATE 3 ML: .5; 3 SOLUTION RESPIRATORY (INHALATION) at 19:05

## 2018-11-09 RX ADMIN — GUAIFENESIN 600 MG: 600 TABLET, EXTENDED RELEASE ORAL at 05:02

## 2018-11-09 RX ADMIN — GUAIFENESIN 600 MG: 600 TABLET, EXTENDED RELEASE ORAL at 17:11

## 2018-11-09 RX ADMIN — FERROUS SULFATE TAB 325 MG (65 MG ELEMENTAL FE) 325 MG: 325 (65 FE) TAB at 09:36

## 2018-11-09 RX ADMIN — OXYCODONE AND ACETAMINOPHEN 1 TABLET: 5; 325 TABLET ORAL at 04:59

## 2018-11-09 RX ADMIN — LISINOPRIL 2.5 MG: 5 TABLET ORAL at 05:04

## 2018-11-09 ASSESSMENT — PAIN SCALES - GENERAL
PAINLEVEL_OUTOF10: 3
PAINLEVEL_OUTOF10: 7
PAINLEVEL_OUTOF10: 3
PAINLEVEL_OUTOF10: 6
PAINLEVEL_OUTOF10: 5

## 2018-11-09 ASSESSMENT — ENCOUNTER SYMPTOMS: SHORTNESS OF BREATH: 0

## 2018-11-09 NOTE — CARE PLAN
Problem: Communication  Goal: The ability to communicate needs accurately and effectively will improve  Outcome: PROGRESSING AS EXPECTED  Encourage pt to voice concerns and feelings.     Problem: Safety  Goal: Will remain free from falls  Outcome: PROGRESSING AS EXPECTED  Bed low and locked, area free of clutter, nonskid socks on, call light within reach, hourly rounding

## 2018-11-09 NOTE — PROGRESS NOTES
Pt sitting at the edge of the bed watching tv and eating ice cream. Call light within reach, will continue to monitor.

## 2018-11-09 NOTE — CARE PLAN
Problem: Infection  Goal: Will remain free from infection  Outcome: PROGRESSING AS EXPECTED  Patient does not have any signs or symptoms of an infection.    Problem: Respiratory:  Goal: Respiratory status will improve  Outcome: PROGRESSING AS EXPECTED  Patient is on 2L which is his baseline and has no complaints of SOB.

## 2018-11-09 NOTE — PROGRESS NOTES
Trauma / Surgical Daily Progress Note    Date of Service  11/9/2018    Chief Complaint  52 y.o. male admitted 11/6/2018 with Acute and chronic respiratory failure (acute-on-chronic) (HCC)    Interval Events  Doing well clinically   Minimal chest tube output  cxr relatively clear      Review of Systems  Review of Systems   Respiratory: Negative for shortness of breath.         Vital Signs  Temp:  [36.4 °C (97.5 °F)-36.9 °C (98.5 °F)] 36.8 °C (98.3 °F)  Pulse:  [60-78] 77  Resp:  [15-22] 18  BP: ()/(47-64) 106/64    Physical Exam  Physical Exam   Constitutional: He is oriented to person, place, and time.   obese   HENT:   Head: Normocephalic and atraumatic.   Eyes: Pupils are equal, round, and reactive to light. EOM are normal. No scleral icterus.   Neck: Normal range of motion. Neck supple. No tracheal deviation present.   Cardiovascular: Normal rate, regular rhythm and normal heart sounds.    Pulmonary/Chest: Effort normal. No respiratory distress.   Right chest tube with minimal output   Abdominal: Soft. He exhibits no distension.   Musculoskeletal: Normal range of motion. He exhibits no edema.   Neurological: He is alert and oriented to person, place, and time. No cranial nerve deficit.   Skin: Skin is warm and dry.       Laboratory  Recent Results (from the past 24 hour(s))   ACCU-CHEK GLUCOSE    Collection Time: 11/08/18 11:24 AM   Result Value Ref Range    Glucose - Accu-Ck 256 (H) 65 - 99 mg/dL   EC-ECHOCARDIOGRAM COMPLETE W/O CONT    Collection Time: 11/08/18 12:16 PM   Result Value Ref Range    Eject.Frac. MOD BP 69.37     Eject.Frac. MOD 4C 67.86     Eject.Frac. MOD 2C 71.17     Left Ventrical Ejection Fraction 55    CYTOLOGY    Collection Time: 11/08/18  2:36 PM   Result Value Ref Range    Cytology Reg See Path Report    ACCU-CHEK GLUCOSE    Collection Time: 11/08/18  5:50 PM   Result Value Ref Range    Glucose - Accu-Ck 164 (H) 65 - 99 mg/dL   ACCU-CHEK GLUCOSE    Collection Time: 11/08/18 10:19 PM    Result Value Ref Range    Glucose - Accu-Ck 149 (H) 65 - 99 mg/dL   Magnesium: Tomorrow AM    Collection Time: 11/09/18  5:10 AM   Result Value Ref Range    Magnesium 2.1 1.5 - 2.5 mg/dL   CBC with Differential    Collection Time: 11/09/18  5:10 AM   Result Value Ref Range    WBC 21.9 (H) 4.8 - 10.8 K/uL    RBC 4.28 (L) 4.70 - 6.10 M/uL    Hemoglobin 7.9 (L) 14.0 - 18.0 g/dL    Hematocrit 31.0 (L) 42.0 - 52.0 %    MCV 72.4 (L) 81.4 - 97.8 fL    MCH 18.5 (L) 27.0 - 33.0 pg    MCHC 25.5 (L) 33.7 - 35.3 g/dL    RDW 69.0 (H) 35.9 - 50.0 fL    Platelet Count 216 164 - 446 K/uL    MPV 10.1 9.0 - 12.9 fL    Neutrophils-Polys 85.40 (H) 44.00 - 72.00 %    Lymphocytes 5.40 (L) 22.00 - 41.00 %    Monocytes 6.30 0.00 - 13.40 %    Eosinophils 0.40 0.00 - 6.90 %    Basophils 0.40 0.00 - 1.80 %    Immature Granulocytes 2.10 (H) 0.00 - 0.90 %    Nucleated RBC 0.40 /100 WBC    Lymphs (Absolute) 1.19 1.00 - 4.80 K/uL    Monos (Absolute) 1.38 (H) 0.00 - 0.85 K/uL    Eos (Absolute) 0.08 0.00 - 0.51 K/uL    Baso (Absolute) 0.09 0.00 - 0.12 K/uL    Immature Granulocytes (abs) 0.46 (H) 0.00 - 0.11 K/uL    NRBC (Absolute) 0.08 K/uL    Neutrophils (Absolute) 18.69 (H) 1.82 - 7.42 K/uL   Comp Metabolic Panel (CMP)    Collection Time: 11/09/18  5:10 AM   Result Value Ref Range    Sodium 132 (L) 135 - 145 mmol/L    Potassium 4.6 3.6 - 5.5 mmol/L    Chloride 99 96 - 112 mmol/L    Co2 25 20 - 33 mmol/L    Anion Gap 8.0 0.0 - 11.9    Glucose 110 (H) 65 - 99 mg/dL    Bun 41 (H) 8 - 22 mg/dL    Creatinine 1.36 0.50 - 1.40 mg/dL    Calcium 8.6 8.5 - 10.5 mg/dL    AST(SGOT) 24 12 - 45 U/L    ALT(SGPT) 23 2 - 50 U/L    Alkaline Phosphatase 101 (H) 30 - 99 U/L    Total Bilirubin 0.6 0.1 - 1.5 mg/dL    Albumin 3.0 (L) 3.2 - 4.9 g/dL    Total Protein 6.7 6.0 - 8.2 g/dL    Globulin 3.7 (H) 1.9 - 3.5 g/dL    A-G Ratio 0.8 g/dL   LDH    Collection Time: 11/09/18  5:10 AM   Result Value Ref Range    LDH Total 321 (H) 107 - 266 U/L   ESTIMATED GFR     Collection Time: 11/09/18  5:10 AM   Result Value Ref Range    GFR If African American >60 >60 mL/min/1.73 m 2    GFR If Non African American 54 (A) >60 mL/min/1.73 m 2   PERIPHERAL SMEAR REVIEW    Collection Time: 11/09/18  5:10 AM   Result Value Ref Range    Peripheral Smear Review see below    DIFFERENTIAL COMMENT    Collection Time: 11/09/18  5:10 AM   Result Value Ref Range    Comments-Diff see below    ACCU-CHEK GLUCOSE    Collection Time: 11/09/18  9:39 AM   Result Value Ref Range    Glucose - Accu-Ck 205 (H) 65 - 99 mg/dL       Fluids    Intake/Output Summary (Last 24 hours) at 11/09/18 1053  Last data filed at 11/09/18 1000   Gross per 24 hour   Intake             1000 ml   Output             2880 ml   Net            -1880 ml       Core Measures & Quality Metrics  Core Measures & Quality Metrics  CHUCHO Score  ETOH Screening    Assessment/Plan  Doing well clinically  Remove chest tube today    Discussed patient condition with Patient.

## 2018-11-09 NOTE — CARE PLAN
Problem: Communication  Goal: The ability to communicate needs accurately and effectively will improve  Outcome: PROGRESSING AS EXPECTED  Patient addresses any questions or concerns that he has with his treatment, care, and medications.    Problem: Venous Thromboembolism (VTW)/Deep Vein Thrombosis (DVT) Prevention:  Goal: Patient will participate in Venous Thrombosis (VTE)/Deep Vein Thrombosis (DVT)Prevention Measures  Outcome: PROGRESSING AS EXPECTED  Patient is receiving warfarin

## 2018-11-09 NOTE — PROGRESS NOTES
Patient is sitting at the edge of the bed, has complaints of rt side rib pain 6/10 declines any intervention, says the lidocaine patch is fine, and no indications of distress. Will continue to monitor.

## 2018-11-09 NOTE — CONSULTS
"Reason for Palliative Care Consult: Advance Care Planning    Consulted by: Dr. Obey White    General: Mr. Medrano is a 52-year-old male with a past medical history of COPD on 2 L of home oxygen, heart failure with preserved ejection fraction 55%, SHASHANK, paroxysmal A. fib, type 2 diabetes mellitus, coronary artery disease, CABG, and dyslipidemia .  He presented to the emergency department with the  Complaints of sob.  Patient describes that his symptoms began the day of admission when he laid down to rest. He had been noticing shortness of breath becoming worse for the past 2 days.  Patient does state that his grandson was sick and his wife later became sick, patient could not describe what their symptoms were and just said they were sick. Patient relates that he has not been feeling very well for the past 3 days and he thinks he might have caught what his grandson and wife had.  Patient goes on to describe that he has been noticing a productive cough of green sputum and he has noticed that he has been using more pillows to sleep, he normally uses 3 pillows to prop himself up but lately he has been using 4.  He also notices that his walking distance is also decreased due to his shortness of breath.  Of note the patient was in the hospital  ( discharged 10/31)  for respiratory failure from COPD but had a fall from a gurney and resultant rib fractures (right 5-8) and right elbow and hand swelling, This has been evaluated and no interventions were needed.  I met with the patient at bedside along with his wife and dtr.   The patients wife was adamant that this episode was the result of negligence on the part of ANDREAS who had him on a gurney and had rotated it to an insecure position.    At any rate the patient is feeling \"back to baseline\" and quite well.   The patient and I began to discuss a POLST form. The patient was insistent that his life is in Gods hands not anyone elses. I explained that the POLST allows the " "natural progression of human fate and does exactly that, allows God's direction to be observed. The patient would hear none of it, politely told me that he needs to see his \"baby\" grow. He does not feel he is equipped to deal with a document like a POLST at this time. His wife did try to intervene but he really thought it would \"mess him up\" to conclude anything on the document.       His wife also commented that his elbow hematoma is of concern and they will be getting a second opinion on it.     ROS: dyspnea much improved,, no chest pain just discomfort where chest tune is,  Cough present, no dysuria, no abd pain, hand and right elbow pain, no sensorium or mentation changes.       Physical:  Large man  Color good  Afebrile  77  18  106/62  96% sats  Neck is plethoric  H; RRR  L less air movement on the right  Abd deferred as patient was sitting to eat  Ext chronic changes in skin  Neuro intact  Mentation clear  MS right elbow with enlerarged and slightly warm hematoma  Additional consults:   General surgery  Pulmonary  Ortho    PMHx:  Asthma  afib  CAD  COPD  CHF  DM    PSHx:  Colonoscopy  Thoracoscopy  CABG  JIM    FHx:  DM and stroke and leukemia in mother  DM in father   Heart and lung disease in brother    Social Hx:  Marries  Current smoker    Allergies as per chart  Assessment:  Dyspnea:  denies except when he exerts himself   Last BM: 11/06/18-    Pain:  none    Depression:  -none evident  Dementia:  None evident      Spiritual:  Is Adventist or spirituality important for coping with this illness?  yes    Has a  or spiritual provider visit been requested?      Palliative Performance Scale: 70 %    Advance Directive:  none    DPOA:  none    POLST:  none     Code Status:  FULL     Outcome:  Met with patient, wife and dtr  at bedside. Introduced myself and explained the role of palliative care.   Please see above as patient did not feel the POLST would be in keeping with his philosophy and felt it would " interfere.  Patient to have second opinion on right elbow hematoma     Plan: Patients pain and symptom needs are met            Thank you for allowing Palliative Care to participate in this patient's care. Please call our team with questions and/or additional needs.

## 2018-11-09 NOTE — PROGRESS NOTES
2 RN skin check: Patient's bilateral ears are red but blanching, Rt elbow is red and swollen, bruising on patient's rt side, and bilateral lower extremities are scabbed dusky and red.

## 2018-11-09 NOTE — PROGRESS NOTES
Report recvd at bedside. Pt sitting up in bed watching tv. No c/o pain or distress. Call light and personal belongings within reach.

## 2018-11-10 ENCOUNTER — APPOINTMENT (OUTPATIENT)
Dept: RADIOLOGY | Facility: MEDICAL CENTER | Age: 53
DRG: 199 | End: 2018-11-10
Attending: NURSE PRACTITIONER
Payer: MEDICARE

## 2018-11-10 VITALS
WEIGHT: 315 LBS | DIASTOLIC BLOOD PRESSURE: 46 MMHG | OXYGEN SATURATION: 91 % | SYSTOLIC BLOOD PRESSURE: 102 MMHG | HEIGHT: 77 IN | TEMPERATURE: 98 F | HEART RATE: 78 BPM | BODY MASS INDEX: 37.19 KG/M2 | RESPIRATION RATE: 18 BRPM

## 2018-11-10 PROBLEM — J96.20 ACUTE ON CHRONIC RESPIRATORY FAILURE (HCC): Status: RESOLVED | Noted: 2018-10-27 | Resolved: 2018-11-10

## 2018-11-10 LAB
ANION GAP SERPL CALC-SCNC: 6 MMOL/L (ref 0–11.9)
ANISOCYTOSIS BLD QL SMEAR: ABNORMAL
BACTERIA FLD AEROBE CULT: NORMAL
BASOPHILS # BLD AUTO: 0.9 % (ref 0–1.8)
BASOPHILS # BLD: 0.19 K/UL (ref 0–0.12)
BUN SERPL-MCNC: 43 MG/DL (ref 8–22)
CALCIUM SERPL-MCNC: 8.9 MG/DL (ref 8.5–10.5)
CHLORIDE SERPL-SCNC: 100 MMOL/L (ref 96–112)
CO2 SERPL-SCNC: 25 MMOL/L (ref 20–33)
CREAT SERPL-MCNC: 1.32 MG/DL (ref 0.5–1.4)
DACRYOCYTES BLD QL SMEAR: NORMAL
EOSINOPHIL # BLD AUTO: 0 K/UL (ref 0–0.51)
EOSINOPHIL NFR BLD: 0 % (ref 0–6.9)
ERYTHROCYTE [DISTWIDTH] IN BLOOD BY AUTOMATED COUNT: 66.6 FL (ref 35.9–50)
GLUCOSE BLD-MCNC: 143 MG/DL (ref 65–99)
GLUCOSE BLD-MCNC: 282 MG/DL (ref 65–99)
GLUCOSE SERPL-MCNC: 125 MG/DL (ref 65–99)
GRAM STN SPEC: NORMAL
HCT VFR BLD AUTO: 30.9 % (ref 42–52)
HGB BLD-MCNC: 8.2 G/DL (ref 14–18)
HYPOCHROMIA BLD QL SMEAR: ABNORMAL
IMM GRANULOCYTES # BLD AUTO: 0.52 K/UL (ref 0–0.11)
IMM GRANULOCYTES NFR BLD AUTO: 2.4 % (ref 0–0.9)
LYMPHOCYTES # BLD AUTO: 0.94 K/UL (ref 1–4.8)
LYMPHOCYTES NFR BLD: 4.4 % (ref 22–41)
MAGNESIUM SERPL-MCNC: 2.1 MG/DL (ref 1.5–2.5)
MANUAL DIFF BLD: NORMAL
MCH RBC QN AUTO: 18.9 PG (ref 27–33)
MCHC RBC AUTO-ENTMCNC: 26.5 G/DL (ref 33.7–35.3)
MCV RBC AUTO: 71.2 FL (ref 81.4–97.8)
MICROCYTES BLD QL SMEAR: ABNORMAL
MONOCYTES # BLD AUTO: 0.19 K/UL (ref 0–0.85)
MONOCYTES NFR BLD AUTO: 0.9 % (ref 0–13.4)
MORPHOLOGY BLD-IMP: NORMAL
NEUTROPHILS # BLD AUTO: 19.98 K/UL (ref 1.82–7.42)
NEUTROPHILS NFR BLD: 93.8 % (ref 44–72)
NRBC # BLD AUTO: 0.09 K/UL
NRBC BLD-RTO: 0.4 /100 WBC
OVALOCYTES BLD QL SMEAR: NORMAL
PLATELET # BLD AUTO: 261 K/UL (ref 164–446)
PLATELET BLD QL SMEAR: NORMAL
PMV BLD AUTO: 9.9 FL (ref 9–12.9)
POIKILOCYTOSIS BLD QL SMEAR: NORMAL
POLYCHROMASIA BLD QL SMEAR: NORMAL
POTASSIUM SERPL-SCNC: 4.7 MMOL/L (ref 3.6–5.5)
RBC # BLD AUTO: 4.34 M/UL (ref 4.7–6.1)
RBC BLD AUTO: PRESENT
SCHISTOCYTES BLD QL SMEAR: NORMAL
SIGNIFICANT IND 70042: NORMAL
SITE SITE: NORMAL
SODIUM SERPL-SCNC: 131 MMOL/L (ref 135–145)
SOURCE SOURCE: NORMAL
WBC # BLD AUTO: 21.3 K/UL (ref 4.8–10.8)

## 2018-11-10 PROCEDURE — 700101 HCHG RX REV CODE 250: Performed by: STUDENT IN AN ORGANIZED HEALTH CARE EDUCATION/TRAINING PROGRAM

## 2018-11-10 PROCEDURE — 82962 GLUCOSE BLOOD TEST: CPT | Mod: 91

## 2018-11-10 PROCEDURE — A9270 NON-COVERED ITEM OR SERVICE: HCPCS | Performed by: STUDENT IN AN ORGANIZED HEALTH CARE EDUCATION/TRAINING PROGRAM

## 2018-11-10 PROCEDURE — 700102 HCHG RX REV CODE 250 W/ 637 OVERRIDE(OP): Performed by: STUDENT IN AN ORGANIZED HEALTH CARE EDUCATION/TRAINING PROGRAM

## 2018-11-10 PROCEDURE — 700111 HCHG RX REV CODE 636 W/ 250 OVERRIDE (IP): Performed by: STUDENT IN AN ORGANIZED HEALTH CARE EDUCATION/TRAINING PROGRAM

## 2018-11-10 PROCEDURE — 71045 X-RAY EXAM CHEST 1 VIEW: CPT

## 2018-11-10 PROCEDURE — 80048 BASIC METABOLIC PNL TOTAL CA: CPT

## 2018-11-10 PROCEDURE — 99239 HOSP IP/OBS DSCHRG MGMT >30: CPT | Mod: GC | Performed by: INTERNAL MEDICINE

## 2018-11-10 PROCEDURE — 83735 ASSAY OF MAGNESIUM: CPT

## 2018-11-10 PROCEDURE — 94640 AIRWAY INHALATION TREATMENT: CPT

## 2018-11-10 PROCEDURE — 36415 COLL VENOUS BLD VENIPUNCTURE: CPT

## 2018-11-10 PROCEDURE — 85007 BL SMEAR W/DIFF WBC COUNT: CPT

## 2018-11-10 PROCEDURE — 94760 N-INVAS EAR/PLS OXIMETRY 1: CPT

## 2018-11-10 PROCEDURE — 85027 COMPLETE CBC AUTOMATED: CPT

## 2018-11-10 PROCEDURE — 700101 HCHG RX REV CODE 250: Performed by: INTERNAL MEDICINE

## 2018-11-10 RX ORDER — FUROSEMIDE 40 MG/1
40 TABLET ORAL DAILY
Qty: 30 TAB | Refills: 2 | Status: SHIPPED | OUTPATIENT
Start: 2018-11-10 | End: 2019-01-04 | Stop reason: SDUPTHER

## 2018-11-10 RX ORDER — ASCORBIC ACID 500 MG
500 TABLET ORAL DAILY
Qty: 30 TAB | Refills: 3 | Status: SHIPPED | OUTPATIENT
Start: 2018-11-11 | End: 2019-01-04

## 2018-11-10 RX ORDER — WARFARIN SODIUM 5 MG/1
5 TABLET ORAL
Qty: 30 TAB | Refills: 1 | Status: SHIPPED | OUTPATIENT
Start: 2018-11-10 | End: 2019-04-03

## 2018-11-10 RX ORDER — OXYCODONE HYDROCHLORIDE AND ACETAMINOPHEN 5; 325 MG/1; MG/1
1-2 TABLET ORAL EVERY 4 HOURS PRN
Qty: 30 TAB | Refills: 0 | Status: SHIPPED | OUTPATIENT
Start: 2018-11-10 | End: 2018-11-17

## 2018-11-10 RX ORDER — POLYETHYLENE GLYCOL 3350 17 G/17G
17 POWDER, FOR SOLUTION ORAL
Qty: 7 EACH | Refills: 0 | Status: SHIPPED | OUTPATIENT
Start: 2018-11-10 | End: 2019-08-15

## 2018-11-10 RX ORDER — CEFDINIR 300 MG/1
300 CAPSULE ORAL EVERY 12 HOURS
Qty: 10 CAP | Refills: 0 | Status: SHIPPED | OUTPATIENT
Start: 2018-11-10 | End: 2018-11-15

## 2018-11-10 RX ORDER — WARFARIN SODIUM 5 MG/1
5 TABLET ORAL
Qty: 30 TAB | Refills: 1 | Status: SHIPPED | OUTPATIENT
Start: 2018-11-10 | End: 2018-11-10

## 2018-11-10 RX ORDER — LIDOCAINE 50 MG/G
1 PATCH TOPICAL EVERY 24 HOURS
Qty: 10 PATCH | Refills: 1 | Status: SHIPPED | OUTPATIENT
Start: 2018-11-11 | End: 2019-01-04

## 2018-11-10 RX ORDER — WARFARIN SODIUM 5 MG/1
5 TABLET ORAL
Status: DISCONTINUED | OUTPATIENT
Start: 2018-11-10 | End: 2018-11-10

## 2018-11-10 RX ORDER — DOXYCYCLINE 100 MG/1
100 TABLET ORAL 2 TIMES DAILY
Qty: 10 TAB | Refills: 0 | Status: SHIPPED | OUTPATIENT
Start: 2018-11-10 | End: 2018-11-15

## 2018-11-10 RX ORDER — LIDOCAINE 50 MG/G
1 PATCH TOPICAL EVERY 24 HOURS
Qty: 10 PATCH | Refills: 1 | Status: SHIPPED | OUTPATIENT
Start: 2018-11-11 | End: 2018-11-10

## 2018-11-10 RX ADMIN — IPRATROPIUM BROMIDE AND ALBUTEROL SULFATE 3 ML: .5; 3 SOLUTION RESPIRATORY (INHALATION) at 10:16

## 2018-11-10 RX ADMIN — ROSUVASTATIN CALCIUM 40 MG: 20 TABLET, FILM COATED ORAL at 05:46

## 2018-11-10 RX ADMIN — BUDESONIDE AND FORMOTEROL FUMARATE DIHYDRATE 2 PUFF: 160; 4.5 AEROSOL RESPIRATORY (INHALATION) at 05:56

## 2018-11-10 RX ADMIN — LIDOCAINE 1 PATCH: 50 PATCH TOPICAL at 08:39

## 2018-11-10 RX ADMIN — PREDNISONE 40 MG: 20 TABLET ORAL at 05:53

## 2018-11-10 RX ADMIN — DOXYCYCLINE 100 MG: 100 TABLET ORAL at 05:50

## 2018-11-10 RX ADMIN — AMIODARONE HYDROCHLORIDE 200 MG: 200 TABLET ORAL at 05:51

## 2018-11-10 RX ADMIN — OXYCODONE AND ACETAMINOPHEN 1 TABLET: 5; 325 TABLET ORAL at 03:49

## 2018-11-10 RX ADMIN — LISINOPRIL 2.5 MG: 5 TABLET ORAL at 05:48

## 2018-11-10 RX ADMIN — FUROSEMIDE 20 MG: 20 TABLET ORAL at 05:47

## 2018-11-10 RX ADMIN — SPIRONOLACTONE 25 MG: 25 TABLET ORAL at 05:48

## 2018-11-10 RX ADMIN — CEFDINIR 300 MG: 300 CAPSULE ORAL at 05:49

## 2018-11-10 RX ADMIN — GUAIFENESIN 600 MG: 600 TABLET, EXTENDED RELEASE ORAL at 05:48

## 2018-11-10 RX ADMIN — OXYCODONE HYDROCHLORIDE AND ACETAMINOPHEN 500 MG: 500 TABLET ORAL at 05:50

## 2018-11-10 RX ADMIN — METOPROLOL SUCCINATE 25 MG: 25 TABLET, EXTENDED RELEASE ORAL at 06:12

## 2018-11-10 RX ADMIN — FERROUS SULFATE TAB 325 MG (65 MG ELEMENTAL FE) 325 MG: 325 (65 FE) TAB at 08:39

## 2018-11-10 RX ADMIN — IPRATROPIUM BROMIDE AND ALBUTEROL SULFATE 3 ML: .5; 3 SOLUTION RESPIRATORY (INHALATION) at 06:34

## 2018-11-10 ASSESSMENT — ENCOUNTER SYMPTOMS: SHORTNESS OF BREATH: 0

## 2018-11-10 ASSESSMENT — PAIN SCALES - GENERAL
PAINLEVEL_OUTOF10: 5
PAINLEVEL_OUTOF10: 5
PAINLEVEL_OUTOF10: 10

## 2018-11-10 NOTE — ASSESSMENT & PLAN NOTE
Advised patient to follow up with his PCP in 5-7 days, recommend addressing options for weight loss at this visit.

## 2018-11-10 NOTE — DISCHARGE INSTRUCTIONS
Discharge Instructions    Discharged to home by car with friend. Discharged via wheelchair, hospital escort: Yes.  Special equipment needed: Not Applicable    Be sure to schedule a follow-up appointment with your primary care doctor or any specialists as instructed.     Discharge Plan:   Pneumococcal Vaccine Administered/Refused: Not given - Patient refused pneumococcal vaccine  Influenza Vaccine Indication: Patient Refuses    I understand that a diet low in cholesterol, fat, and sodium is recommended for good health. Unless I have been given specific instructions below for another diet, I accept this instruction as my diet prescription.   Other diet: Cardiac/Diabetic    Special Instructions: None    · Is patient discharged on Warfarin / Coumadin?   Yes    You are receiving the drug warfarin. Please understand the importance of monitoring warfarin with scheduled PT/INR blood draws.  Follow-up with the [facility] in one week for INR lab..    IMPORTANT: HOW TO USE THIS INFORMATION:  This is a summary and does NOT have all possible information about this product. This information does not assure that this product is safe, effective, or appropriate for you. This information is not individual medical advice and does not substitute for the advice of your health care professional. Always ask your health care professional for complete information about this product and your specific health needs.      WARFARIN - ORAL (WARF-uh-rin)      COMMON BRAND NAME(S): Coumadin      WARNING:  Warfarin can cause very serious (possibly fatal) bleeding. This is more likely to occur when you first start taking this medication or if you take too much warfarin. To decrease your risk for bleeding, your doctor or other health care provider will monitor you closely and check your lab results (INR test) to make sure you are not taking too much warfarin. Keep all medical and laboratory appointments. Tell your doctor right away if you notice  "any signs of serious bleeding. See also Side Effects section.      USES:  This medication is used to treat blood clots (such as in deep vein thrombosis-DVT or pulmonary embolus-PE) and/or to prevent new clots from forming in your body. Preventing harmful blood clots helps to reduce the risk of a stroke or heart attack. Conditions that increase your risk of developing blood clots include a certain type of irregular heart rhythm (atrial fibrillation), heart valve replacement, recent heart attack, and certain surgeries (such as hip/knee replacement). Warfarin is commonly called a \"blood thinner,\" but the more correct term is \"anticoagulant.\" It helps to keep blood flowing smoothly in your body by decreasing the amount of certain substances (clotting proteins) in your blood.      HOW TO USE:  Read the Medication Guide provided by your pharmacist before you start taking warfarin and each time you get a refill. If you have any questions, ask your doctor or pharmacist. Take this medication by mouth with or without food as directed by your doctor or other health care professional, usually once a day. It is very important to take it exactly as directed. Do not increase the dose, take it more frequently, or stop using it unless directed by your doctor. Dosage is based on your medical condition, laboratory tests (such as INR), and response to treatment. Your doctor or other health care provider will monitor you closely while you are taking this medication to determine the right dose for you. Use this medication regularly to get the most benefit from it. To help you remember, take it at the same time each day. It is important to eat a balanced, consistent diet while taking warfarin. Some foods can affect how warfarin works in your body and may affect your treatment and dose. Avoid sudden large increases or decreases in your intake of foods high in vitamin K (such as broccoli, cauliflower, cabbage, brussels sprouts, kale, " spinach, and other green leafy vegetables, liver, green tea, certain vitamin supplements). If you are trying to lose weight, check with your doctor before you try to go on a diet. Cranberry products may also affect how your warfarin works. Limit the amount of cranberry juice (16 ounces/480 milliliters a day) or other cranberry products you may drink or eat.      SIDE EFFECTS:  Nausea, loss of appetite, or stomach/abdominal pain may occur. If any of these effects persist or worsen, tell your doctor or pharmacist promptly. Remember that your doctor has prescribed this medication because he or she has judged that the benefit to you is greater than the risk of side effects. Many people using this medication do not have serious side effects. This medication can cause serious bleeding if it affects your blood clotting proteins too much (shown by unusually high INR lab results). Even if your doctor stops your medication, this risk of bleeding can continue for up to a week. Tell your doctor right away if you have any signs of serious bleeding, including: unusual pain/swelling/discomfort, unusual/easy bruising, prolonged bleeding from cuts or gums, persistent/frequent nosebleeds, unusually heavy/prolonged menstrual flow, pink/dark urine, coughing up blood, vomit that is bloody or looks like coffee grounds, severe headache, dizziness/fainting, unusual or persistent tiredness/weakness, bloody/black/tarry stools, chest pain, shortness of breath, difficulty swallowing. Tell your doctor right away if any of these unlikely but serious side effects occur: persistent nausea/vomiting, severe stomach/abdominal pain, yellowing eyes/skin. This drug rarely has caused very serious (possibly fatal) problems if its effects lead to small blood clots (usually at the beginning of treatment). This can lead to severe skin/tissue damage that may require surgery or amputation if left untreated. Patients with certain blood conditions (protein C or  S deficiency) may be at greater risk. Get medical help right away if any of these rare but serious side effects occur: painful/red/purplish patches on the skin (such as on the toe, breast, abdomen), change in the amount of urine, vision changes, confusion, slurred speech, weakness on one side of the body. A very serious allergic reaction to this drug is rare. However, get medical help right away if you notice any symptoms of a serious allergic reaction, including: rash, itching/swelling (especially of the face/tongue/throat), severe dizziness, trouble breathing. This is not a complete list of possible side effects. If you notice other effects not listed above, contact your doctor or pharmacist. In the [place] - Call your doctor for medical advice about side effects. You may report side effects to FDA at 6-592-CVP-5509. In [place] - Call your doctor for medical advice about side effects. You may report side effects to [facility] [place] at 1-249.830.3609.      PRECAUTIONS:  Before taking warfarin, tell your doctor or pharmacist if you are allergic to it; or if you have any other allergies. This product may contain inactive ingredients, which can cause allergic reactions or other problems. Talk to your pharmacist for more details. Before using this medication, tell your doctor or pharmacist your medical history, especially of: blood disorders (such as anemia, hemophilia), bleeding problems (such as bleeding of the stomach/intestines, bleeding in the brain), blood vessel disorders (such as aneurysms), recent major injury/surgery, liver disease, alcohol use, mental/mood disorders (including memory problems), frequent falls/injuries. It is important that all your doctors and dentists know that you take warfarin. Before having surgery or any medical/dental procedures, tell your doctor or dentist that you are taking this medication and about all the products you use (including prescription drugs, nonprescription drugs, and herbal  products). Avoid getting injections into the muscles. If you must have an injection into a muscle (for example, a flu shot), it should be given in the arm. This way, it will be easier to check for bleeding and/or apply pressure bandages. This medication may cause stomach bleeding. Daily use of alcohol while using this medicine will increase your risk for stomach bleeding and may also affect how this medication works. Limit or avoid alcoholic beverages. If you have not been eating well, if you have an illness or infection that causes fever, vomiting, or diarrhea for more than 2 days, or if you start using any antibiotic medications, contact your doctor or pharmacist immediately because these conditions can affect how warfarin works. This medication can cause heavy bleeding. To lower the chance of getting cut, bruised, or injured, use great caution with sharp objects like safety razors and nail cutters. Use an electric razor when shaving and a soft toothbrush when brushing your teeth. Avoid activities such as contact sports. If you fall or injure yourself, especially if you hit your head, call your doctor immediately. Your doctor may need to check you. The Food & Drug Administration has stated that generic warfarin products are interchangeable. However, consult your doctor or pharmacist before switching warfarin products. Be careful not to take more than one medication that contains warfarin unless specifically directed by the doctor or health care provider who is monitoring your warfarin treatment. Older adults may be at greater risk for bleeding while using this drug. This medication is not recommended for use during pregnancy because of serious (possibly fatal) harm to an unborn baby. Discuss the use of reliable forms of birth control with your doctor. If you become pregnant or think you may be pregnant, tell your doctor immediately. If you are planning pregnancy, discuss a plan for managing your condition with  "your doctor before you become pregnant. Your doctor may switch the type of medication you use during pregnancy. Very small amounts of this medication may pass into breast milk but is unlikely to harm a nursing infant. Consult your doctor before breast-feeding.      DRUG INTERACTIONS:  Drug interactions may change how your medications work or increase your risk for serious side effects. This document does not contain all possible drug interactions. Keep a list of all the products you use (including prescription/nonprescription drugs and herbal products) and share it with your doctor and pharmacist. Do not start, stop, or change the dosage of any medicines without your doctor's approval. Warfarin interacts with many prescription, nonprescription, vitamin, and herbal products. This includes medications that are applied to the skin or inside the vagina or rectum. The interactions with warfarin usually result in an increase or decrease in the \"blood-thinning\" (anticoagulant) effect. Your doctor or other health care professional should closely monitor you to prevent serious bleeding or clotting problems. While taking warfarin, it is very important to tell your doctor or pharmacist of any changes in medications, vitamins, or herbal products that you are taking. Some products that may interact with this drug include: capecitabine, imatinib, mifepristone. Aspirin, aspirin-like drugs (salicylates), and nonsteroidal anti-inflammatory drugs (NSAIDs such as ibuprofen, naproxen, celecoxib) may have effects similar to warfarin. These drugs may increase the risk of bleeding problems if taken during treatment with warfarin. Carefully check all prescription/nonprescription product labels (including drugs applied to the skin such as pain-relieving creams) since the products may contain NSAIDs or salicylates. Talk to your doctor about using a different medication (such as acetaminophen) to treat pain/fever. Low-dose aspirin and related " drugs (such as clopidogrel, ticlopidine) should be continued if prescribed by your doctor for specific medical reasons such as heart attack or stroke prevention. Consult your doctor or pharmacist for more details. Many herbal products interact with warfarin. Tell your doctor before taking any herbal products, especially bromelains, coenzyme Q10, cranberry, danshen, dong quai, fenugreek, garlic, ginkgo biloba, ginseng, and Ronak's wort, among others. This medication may interfere with a certain laboratory test to measure theophylline levels, possibly causing false test results. Make sure laboratory personnel and all your doctors know you use this drug.      OVERDOSE:  If overdose is suspected, contact a poison control center or emergency room immediately. [place] residents can call the [place] National Poison Hotline at 1-403.420.1858. [place] residents can call a provincial poison control center. Symptoms of overdose may include: bloody/black/tarry stools, pink/dark urine, unusual/prolonged bleeding.      NOTES:  Do not share this medication with others. Laboratory and/or medical tests (such as INR, complete blood count) must be performed periodically to monitor your progress or check for side effects. Consult your doctor for more details.      MISSED DOSE:  For the best possible benefit, do not miss any doses. If you do miss a dose and remember on the same day, take it as soon as you remember. If you remember on the next day, skip the missed dose and resume your usual dosing schedule. Do not double the dose to catch up because this could increase your risk for bleeding. Keep a record of missed doses to give to your doctor or pharmacist. Contact your doctor or pharmacist if you miss 2 or more doses in a row.      STORAGE:  Store at room temperature away from light and moisture. Do not store in the bathroom. Keep all medications away from children and pets. Do not flush medications down the toilet or pour them into a drain  unless instructed to do so. Properly discard this product when it is  or no longer needed. Consult your pharmacist or local waste disposal company for more details about how to safely discard your product.      MEDICAL ALERT:  Your condition and medication can cause complications in a medical emergency. For information about enrolling in MedicAlert, call 1-816.739.7485 ([place]) or 1-903.419.5735 ([place]).      Information last revised 2010 Copyright(c) 2010 [employer]             Depression / Suicide Risk    As you are discharged from this [facility] facility, it is important to learn how to keep safe from harming yourself.    Recognize the warning signs:  · Abrupt changes in personality, positive or negative- including increase in energy   · Giving away possessions  · Change in eating patterns- significant weight changes-  positive or negative  · Change in sleeping patterns- unable to sleep or sleeping all the time   · Unwillingness or inability to communicate  · Depression  · Unusual sadness, discouragement and loneliness  · Talk of wanting to die  · Neglect of personal appearance   · Rebelliousness- reckless behavior  · Withdrawal from people/activities they love  · Confusion- inability to concentrate     If you or a loved one observes any of these behaviors or has concerns about self-harm, here's what you can do:  · Talk about it- your feelings and reasons for harming yourself  · Remove any means that you might use to hurt yourself (examples: pills, rope, extension cords, firearm)  · Get professional help from the community (Mental Health, Substance Abuse, psychological counseling)  · Do not be alone:Call your Safe Contact- someone whom you trust who will be there for you.  · Call your local CRISIS HOTLINE 561-5194 or 193-197-0865  · Call your local Children's Mobile Crisis Response Team [place] (549) 063-3653 or www.[employer]  · Call the toll free National Suicide Prevention  Hotlines   · National Suicide Prevention Lifeline 870-939-WMHE (8104)  · [facility] 800-SUICIDE (989-8030)      Pneumothorax  Introduction  A pneumothorax, commonly called a collapsed lung, is a condition in which air leaks from a lung and builds up in the space between the lung and the chest wall (pleural space). The air in a pneumothorax is trapped outside the lung and takes up space, preventing the lung from fully expanding. This is a condition that usually occurs suddenly. The buildup of air may be small or large. A small pneumothorax may go away on its own. When a pneumothorax is larger, it will often require medical treatment and hospitalization.  What are the causes?  A pneumothorax can sometimes happen quickly with no apparent cause. People with underlying lung problems, particularly COPD or emphysema, are at higher risk of pneumothorax. However, pneumothorax can happen quickly even in people with no prior known lung problems. Trauma, surgery, medical procedures, or injury to the chest wall can also cause a pneumothorax.  What are the signs or symptoms?  Sometimes a pneumothorax will have no symptoms. When symptoms are present, they can include:  · Chest pain.  · Shortness of breath.  · Increased rate of breathing.  · Bluish color to your lips or skin (cyanosis).  How is this diagnosed?  Pneumothorax is usually diagnosed by a chest X-ray or chest CT scan. Your health care provider will also take a medical history and perform a physical exam to determine why you may have a pneumothorax.  How is this treated?  A small pneumothorax may go away on its own without treatment. Extra oxygen can sometimes help a small pneumothorax go away more quickly. For a larger pneumothorax or a pneumothorax that is causing symptoms, a procedure is usually needed to drain the air. In some cases, the health care provider may drain the air using a needle. In other cases, a chest tube may be inserted into the pleural  space. A chest tube is a small tube placed between the ribs and into the pleural space. This removes the extra air and allows the lung to expand back to its normal size. A large pneumothorax will usually require a hospital stay. If there is ongoing air leakage into the pleural space, then the chest tube may need to remain in place for several days until the air leak has healed. In some cases, surgery may be needed.  Follow these instructions at home:  · Only take over-the-counter or prescription medicines as directed by your health care provider.  · If a cough or pain makes it difficult for you to sleep at night, try sleeping in a semi-upright position in a recliner or by using 2 or 3 pillows.  · Rest and limit activity as directed by your health care provider.  · If you had a chest tube and it was removed, ask your health care provider when it is okay to remove the dressing. Until your health care provider says you can remove the dressing, do not allow it to get wet.  · Do not smoke. Smoking is a risk factor for pneumothorax.  · Do not fly in an airplane or scuba dive until your health care provider says it is okay.  · Follow up with your health care provider as directed.  Get help right away if:  · You have increasing chest pain or shortness of breath.  · You have a cough that is not controlled with suppressants.  · You begin coughing up blood.  · You have pain that is getting worse or is not controlled with medicines.  · You cough up thick, discolored mucus (sputum) that is yellow to green in color.  · You have redness, increasing pain, or discharge at the site where a chest tube had been in place (if your pneumothorax was treated with a chest tube).  · The site where your chest tube was located opens up.  · You feel air coming out of the site where the chest tube was placed.  · You have a fever or persistent symptoms for more than 2-3 days.  · You have a fever and your symptoms suddenly get worse.  This  information is not intended to replace advice given to you by your health care provider. Make sure you discuss any questions you have with your health care provider.  Document Released: 12/18/2006 Document Revised: 05/25/2017 Document Reviewed: 05/13/2015  © 2017 [employer]      Chronic Obstructive Pulmonary Disease Exacerbation  Chronic obstructive pulmonary disease (COPD) is a common lung condition in which airflow from the lungs is limited. COPD is a general term that can be used to describe many different lung problems that limit airflow, including chronic bronchitis and emphysema. COPD exacerbations are episodes when breathing symptoms become much worse and require extra treatment. Without treatment, COPD exacerbations can be life threatening, and frequent COPD exacerbations can cause further damage to your lungs.  What are the causes?  · Respiratory infections.  · Exposure to smoke.  · Exposure to air pollution, chemical fumes, or dust.  Sometimes there is no apparent cause or trigger.  What increases the risk?  · Smoking cigarettes.  · Older age.  · Frequent prior COPD exacerbations.  What are the signs or symptoms?  · Increased coughing.  · Increased thick spit (sputum) production.  · Increased wheezing.  · Increased shortness of breath.  · Rapid breathing.  · Chest tightness.  How is this diagnosed?  Your medical history, a physical exam, and tests will help your health care provider make a diagnosis. Tests may include:  · A chest X-ray.  · Basic lab tests.  · Sputum testing.  · An arterial blood gas test.  How is this treated?  Depending on the severity of your COPD exacerbation, you may need to be admitted to a hospital for treatment. Some of the treatments commonly used to treat COPD exacerbations are:  · Antibiotic medicines.  · Bronchodilators. These are drugs that expand the air passages. They may be given with an inhaler or nebulizer. Spacer devices may be needed to help improve drug  delivery.  · Corticosteroid medicines.  · Supplemental oxygen therapy.  · Airway clearing techniques, such as noninvasive ventilation (NIV) and positive expiratory pressure (PEP). These provide respiratory support through a mask or other noninvasive device.  Follow these instructions at home:  · Do not smoke. Quitting smoking is very important to prevent COPD from getting worse and exacerbations from happening as often.  · Avoid exposure to all substances that irritate the airway, especially to tobacco smoke.  · If you were prescribed an antibiotic medicine, finish it all even if you start to feel better.  · Take all medicines as directed by your health care provider. It is important to use correct technique with inhaled medicines.  · Drink enough fluids to keep your urine clear or pale yellow (unless you have a medical condition that requires fluid restriction).  · Use a cool mist vaporizer. This makes it easier to clear your chest when you cough.  · If you have a home nebulizer and oxygen, continue to use them as directed.  · Maintain all necessary vaccinations to prevent infections.  · Exercise regularly.  · Eat a healthy diet.  · Keep all follow-up appointments as directed by your health care provider.  Get help right away if:  · You have worsening shortness of breath.  · You have trouble talking.  · You have severe chest pain.  · You have blood in your sputum.  · You have a fever.  · You have weakness, vomit repeatedly, or faint.  · You feel confused.  · You continue to get worse.  This information is not intended to replace advice given to you by your health care provider. Make sure you discuss any questions you have with your health care provider.  Document Released: 10/14/2008 Document Revised: 05/25/2017 Document Reviewed: 08/22/2014  [employer] Patient Education © 2017 [employer]

## 2018-11-10 NOTE — PROGRESS NOTES
Bedside report received from night nurse. Assumed pt care. Pt AOx4, reviewed plan of care with pt. Tele box and rhythm verified. Chart and labs reviewed. Bed in lowest and locked position, call light with in reach.

## 2018-11-10 NOTE — PROGRESS NOTES
[facility] – [place] INTERNAL MEDICINE ATTENDING NOTE:   Matthew Michael MD      Visit Time:   Attending/resident bedside rounds 9-11:30 AM     PATIENT ID  Name:             Joshua Medrano   YOB: 1965  Age:                 52 y.o.  male   MRN:               [id number]  Admit:  11/6/2018     I saw and examined the patient and discussed the management with the resident staff.  I reviewed the resident's note and agree with the resident's findings and plan as documented in the resident's note except as documented in the attending note. Please reference resident daily note for complete information.    The chart was reviewed and summarized.  Available labs, imaging, O2 sats ,  EKGs were reviewed. Available nursing, consultant, and resident notes were reviewed. I am actively involved in the patient's care.                                                                            ______________________________________________________________________            52( admit 11/6    )  INTERVAL:  Chart reviewed/summarized,       PM:   surgery/Devia: chest tube removed      Nov 9AM; AF, HR 67, BP 89, 100% 2L NC  DATA: WBC 24, HB 9 *-9), , PMN 80423,  , Na 132, K 4.6, CO2 25, , BUN 41, CR 1.36 (141), , BR ).6, GLOB 3.7, GFR 54 , INR 1.34, MG 2.1, trop 0.12 , thoracentesis cutlres neg, few WBCs, NOS,   NURSING: asymptomatic at rest      Nov 8: AF, HR 78, BP 100s, 99% 2L NC     March 24AM: AF, HR 63, , 98% 2L NC -- asymptoamtic, ambulatory, some cough, no wheezing, cardiac eveal no ACS, no HF  DATA: WBC 12-13, HB 8.7 (9.1), , MCV 70, Na 133, K 4.4, CO2 23, , BUN 39, cR 1.41 (1.37), LT nwl , INR 3.08   Surgery: Deeter/chest tube     Impression:  * acute/chornic hypoxemic resp failure, acute/chronic bronchitis, COPD -- ICU admit , recent rib fractures, recurrent hemorrhagic effusions (neg cultures for empyema)  --> O2, BIPAP, chest tube 11/7 (hematoma?)  --> baseline, BIPAP  off  * CAD/AFIB/HFpEF -- Ace, spirionolactone low dose, BBL, ASA, statin, f/u cards, no ACS or acute HF apparent   * DM2,  --> change to basal bolus dosing   * HSM, Lnopathy, r/o inflammatory , low grade NHL, ALICEA      OPM: B2A, amiodraone, symbicort, TUMS, C4, SSI, duoneb, oxycodone, PRED 40mg, RT, spironolactone 25mg Qday      CORE:  Code Status (  FULL  --------------------------------------------------------------------------------------------------  Hospital Summary/ Patient System Review      NP:   *admit(  HA, nonfocal, alert     EENT:   *admit(  URTI, no JVD     MSK/PAIN:   *admit(       CVS:   *admit(  CAD/CABG/2V/AFIB/DLD, trop 0.14 x 3,   (Dry 356)   ECHO (11/8: EF 55%, limited, RVE, normal fx, MONO, LA 32cm wnl, MAC, mild mitral stenosis, aortic stenosis 1+, RVSP 65mm, normal pericardium  Impression: CAD/CABG/DLD/AFIB, no ACS, no acute HF, BLE venous stasis, PULM HTN     ECHO Jan 2018: EF 25%, mod/severely reduced LV systolic fx, global hypokinesis, dilated Rv, normal RA, IVC normal, LA wnl, thick MV, aortic sclerosis, RVSP not given, normal effusion     PULM:   *admit(  COPD/asthma, GLF/rib fractures, chest tube, hematoma, WBC 1333, LDH 1024 , INR 3.73 , high CO2 (40s) , procalcitoni 0.22  CT chest (11/7):  small rigth PTX, chest tube, multifocal infiltrates RML, RLL, trace effusion right (post chest tube), right rib fx, fracture RIGH T5/6/8/9 , CM, ASVD, splenomegaly   Impression: recurrent hemorrhagic pleural effusions, trauma, anticoagulatoin? , exudative (normal Ph, sugar) against empyema, COPD/CLD,      jan 2018: 1406 WBC, exudative , pH 7.5, VATS: neg cytology, CT: large left pleural effusion, wedge liver infarct?, perisplenic ascites, ASVD/CAD, hepatomegaly, small periheraptic effusion  March 2018: PFTs: mod restrictive, severe obstructive disease, air trappig     GI:   *admit(  BMI 34, vomiting 2 days PTA, refused CONSTANZA,  , CT chest : splenomegaly, lipase 30   CT abd with (8/2018:  hepatomegaly, no biliary pathology, GB wnl, SPLENOMEGALY, diverticulosis, 5.2cm abdominal hernia, bladder wnl , retroperitoneal, mesenteric adenopathy , lymphedema abd wall ?   Impression: HSM etiology ? , nondx Lnopathy   Plan: LDH, B2 microglobulin      RENAL:   *admit(  Na 134 K 4.7, CO2 26, -170, CR 1.28-1.43     HEME:   *admit( WBC 20-24, HB 8-9, PLT 290s , EOS < 500 , bands  --> WBC 25, HB 8, , bands , Iron 14, TIBC 384 ferritin 14, B12 441, foalte 10   Impression: FE def anemia, blood loss ?      March 2018: Ferrint 26     ENDO:   *admit(  Dm2/no meds, HBA1C 5.6, -256, steroids, TSH 4.910 , LDL 40, TG 72      DERM:   *admit(       ID:   *admit(  Proclc 0.0.22,  FLU neg, cultures, BC neg, lactic acid 1.1

## 2018-11-10 NOTE — PROGRESS NOTES
Trauma / Surgical Daily Progress Note    Date of Service  11/10/2018    Chief Complaint  52 y.o. male admitted 11/6/2018 with Acute and chronic respiratory failure (acute-on-chronic) (HCC)    Interval Events  Doing well clinically   cxr reviewed-minimal effusion        Review of Systems  Review of Systems   Respiratory: Negative for shortness of breath.         Vital Signs  Temp:  [36.2 °C (97.1 °F)-37 °C (98.6 °F)] 36.7 °C (98 °F)  Pulse:  [61-80] 78  Resp:  [16-20] 16  BP: ()/(48-65) 97/50    Physical Exam  Physical Exam   Constitutional: He is oriented to person, place, and time.   obese   HENT:   Head: Normocephalic and atraumatic.   Eyes: Pupils are equal, round, and reactive to light. EOM are normal. Right eye exhibits no discharge. Left eye exhibits no discharge. No scleral icterus.   Neck: Normal range of motion. Neck supple. No tracheal deviation present.   Cardiovascular: Normal rate, regular rhythm and normal heart sounds.    Pulmonary/Chest: Effort normal. No respiratory distress. He has no wheezes.   Right chest tube with minimal output   Abdominal: Soft. He exhibits no distension.   Musculoskeletal: Normal range of motion. He exhibits no edema or deformity.   Neurological: He is alert and oriented to person, place, and time. No cranial nerve deficit. Coordination normal.   Skin: Skin is warm and dry. No rash noted. No erythema.   Psychiatric: He has a normal mood and affect. His behavior is normal. Thought content normal.       Laboratory  Recent Results (from the past 24 hour(s))   ACCU-CHEK GLUCOSE    Collection Time: 11/09/18  1:11 PM   Result Value Ref Range    Glucose - Accu-Ck 186 (H) 65 - 99 mg/dL   ACCU-CHEK GLUCOSE    Collection Time: 11/09/18  5:13 PM   Result Value Ref Range    Glucose - Accu-Ck 171 (H) 65 - 99 mg/dL   ACCU-CHEK GLUCOSE    Collection Time: 11/10/18  5:55 AM   Result Value Ref Range    Glucose - Accu-Ck 143 (H) 65 - 99 mg/dL   BASIC METABOLIC PANEL    Collection Time:  11/10/18  7:02 AM   Result Value Ref Range    Sodium 131 (L) 135 - 145 mmol/L    Potassium 4.7 3.6 - 5.5 mmol/L    Chloride 100 96 - 112 mmol/L    Co2 25 20 - 33 mmol/L    Glucose 125 (H) 65 - 99 mg/dL    Bun 43 (H) 8 - 22 mg/dL    Creatinine 1.32 0.50 - 1.40 mg/dL    Calcium 8.9 8.5 - 10.5 mg/dL    Anion Gap 6.0 0.0 - 11.9   CBC WITH DIFFERENTIAL    Collection Time: 11/10/18  7:02 AM   Result Value Ref Range    WBC 21.3 (H) 4.8 - 10.8 K/uL    RBC 4.34 (L) 4.70 - 6.10 M/uL    Hemoglobin 8.2 (L) 14.0 - 18.0 g/dL    Hematocrit 30.9 (L) 42.0 - 52.0 %    MCV 71.2 (L) 81.4 - 97.8 fL    MCH 18.9 (L) 27.0 - 33.0 pg    MCHC 26.5 (L) 33.7 - 35.3 g/dL    RDW 66.6 (H) 35.9 - 50.0 fL    Platelet Count 261 164 - 446 K/uL    MPV 9.9 9.0 - 12.9 fL    Neutrophils-Polys 93.80 (H) 44.00 - 72.00 %    Lymphocytes 4.40 (L) 22.00 - 41.00 %    Monocytes 0.90 0.00 - 13.40 %    Eosinophils 0.00 0.00 - 6.90 %    Basophils 0.90 0.00 - 1.80 %    Immature Granulocytes 2.40 (H) 0.00 - 0.90 %    Nucleated RBC 0.40 /100 WBC    Neutrophils (Absolute) 19.98 (H) 1.82 - 7.42 K/uL    Lymphs (Absolute) 0.94 (L) 1.00 - 4.80 K/uL    Monos (Absolute) 0.19 0.00 - 0.85 K/uL    Eos (Absolute) 0.00 0.00 - 0.51 K/uL    Baso (Absolute) 0.19 (H) 0.00 - 0.12 K/uL    Immature Granulocytes (abs) 0.52 (H) 0.00 - 0.11 K/uL    NRBC (Absolute) 0.09 K/uL    Hypochromia 2+     Anisocytosis 2+     Microcytosis 2+    MAGNESIUM    Collection Time: 11/10/18  7:02 AM   Result Value Ref Range    Magnesium 2.1 1.5 - 2.5 mg/dL   ESTIMATED GFR    Collection Time: 11/10/18  7:02 AM   Result Value Ref Range    GFR If African American >60 >60 mL/min/1.73 m 2    GFR If Non  56 (A) >60 mL/min/1.73 m 2   DIFFERENTIAL MANUAL    Collection Time: 11/10/18  7:02 AM   Result Value Ref Range    Manual Diff Status PERFORMED    PERIPHERAL SMEAR REVIEW    Collection Time: 11/10/18  7:02 AM   Result Value Ref Range    Peripheral Smear Review see below    PLATELET ESTIMATE     Collection Time: 11/10/18  7:02 AM   Result Value Ref Range    Plt Estimation Normal    MORPHOLOGY    Collection Time: 11/10/18  7:02 AM   Result Value Ref Range    RBC Morphology Present     Polychromia 1+     Poikilocytosis 1+     Ovalocytes 1+     Schistocytes 2+     Tear Drop Cells 1+        Fluids    Intake/Output Summary (Last 24 hours) at 11/10/18 1155  Last data filed at 11/10/18 0400   Gross per 24 hour   Intake             1500 ml   Output             2350 ml   Net             -850 ml       Core Measures & Quality Metrics  Core Measures & Quality Metrics  CHUCHO Score  ETOH Screening    Assessment/Plan  Doing well clinically  Surgery signing off    Discussed patient condition with Patient.      Quality-Core Measures

## 2018-11-10 NOTE — PROGRESS NOTES
Chest tube site dressing appeared to be saturated w/ serous drainage. Site does not appear to be irritated. Cleaned around incision site w/ chlorhexidine. New dressing placed.  Smith COPELAND

## 2018-11-10 NOTE — DISCHARGE SUMMARY
Internal Medicine Discharge Summary  Note Author: Jadiel Champion M.D.       Name Joshua Medrano     1965   Age/Sex 52 y.o. male   MRN [id number]       Admit Date:   2018       Discharge Date:   11/10/18    Service:   [facility] Internal Medicine Blue team  Attending Physician(s):   Dr. Michael      Senior Resident(s):   Dr. Champion  Julius Resident(s):   Dr. Alvarenga  PCP:   Nam Le M.D.      Primary Diagnosis:   Acute on chronic respiratory failure secondary to likely hemothorax due to recent trauma      Secondary Diagnoses:                Principal Problem (Resolved):    Acute on chronic respiratory failure (HCC) POA: Yes  Active Problems:    Chronic obstructive pulmonary disease with acute exacerbation (HCC) POA: Yes    Pneumonia due to infectious organism POA: Yes    Acute exacerbation of CHF (congestive heart failure) (HCC) POA: Yes    CAD (coronary artery disease) POA: Yes    Paroxysmal atrial fibrillation (HCC) POA: Yes    Heart failure with preserved ejection fraction, borderline, class IV (HCC) POA: Yes    Leukocytosis POA: Yes    SHASHANK (obstructive sleep apnea) POA: Yes    Microcytic anemia POA: Yes    Closed fracture of multiple ribs of right side POA: Yes    Morbid obesity with BMI of 40.0-44.9, adult (HCC) POA: Yes    Elevated troponin POA: Unknown      Hospital Summary (Brief Narrative):       52 y.o. male with COPD on 2 L home O2, CHF with EF 55%, paroxysmal atrial fib, CABG, DLD, and DMT2. Presented for worsening SOB x 2 days, then respiratory failure, and admission to ICU. Also with recent past hospital discharge on 10/31/18 for respiratory failure from COPD exacerbation during which he had a fall from the EMS [facility] and sustained rib fractures of ribs 5-8 on the right as well as right elbow swelling.   Presented this admission with worsened SOB and increased oxygen + BiPAP requirements. ?   The patient underwent a thoracentesis, then chest tube placement on 18. His warfarin  was held for signs of active bleeding. The patient suffered from a transient decrease in BP due to a large amount of fluid loss into his chest tube after which he was stabilized and noted to have better breathing and less oxygen requirement.     The patient was stable for discharge out of the ICU to the general medical floor on 11/8/18. His breathing remained stable on 2 L of supplemental oxygen and his right-sided thoracic pain was well managed with a lidocaine patch and PRN Percocet. The patient's chest tube was removed on 11/9/18 without any complications.  A repeat chest x-ray on 11/10/18 showed interval removal of right chest tube without evidence of pneumothorax.  The patient was stable at that his chronic conditions to be discharged home with close outpatient follow-up with his primary care physician and a pulmonologist.     Patient /Hospital Summary (Details -- Problem Oriented) :          Pneumonia due to infectious organism   Assessment & Plan    Possible pneumonia or parapneumonic effusion in addition to bleeding  , blood and pleural fluid cultures negative for organisms   Treated empirically with Cefepime in the ICU  Stopped Cefepime 11/9/18, started empiric CAP treatment with cefdinir 300 mg PO BID and doxy 100 mg BID  Will discharge patient with 5 day course of cefdinir 300 mg PO BID and doxy 100 mg BID     Chronic obstructive pulmonary disease with acute exacerbation (HCC)   Assessment & Plan    Please refer to acute on chronic respiratory failure assessment and plan     * Acute on chronic respiratory failure (HCC)-resolved as of 11/10/2018   Assessment & Plan    Breathing well on home dose of 2 L supplemental O2       Heart failure with preserved ejection fraction, borderline, class IV (HCC)   Assessment & Plan    Please refer to acute on chronic respiratory failure problem for assessment and plan     Paroxysmal atrial fibrillation (HCC)   Assessment & Plan    On warfarin 5 mg daily at home,  the patient's warfarin was held during this admission given his recent fall causing R rib fractures with lung injury and sanguinous chest tube drainage.  Chest tube removed 11/9/18 without complications.  Patient to re-start warfarin 7 days from today on Saturday, 11/17/18.     Elevated troponin   Assessment & Plan    Patient had a history of slightly elevated troponin levels in the past. This could be due to his CHF.  EKG did not reveal any ST segment changes and patient is not complaining of any chest pain or chest discomfort.      Morbid obesity with BMI of 40.0-44.9, adult (HCC)   Assessment & Plan    Advised patient to follow up with his PCP in 5-7 days, recommend addressing options for weight loss at this visit.     Microcytic anemia   Assessment & Plan    Patient has a microcytic anemia, MCV is 71.5.  This is due to iron deficiency, red cell distribution width is elevated and last iron panel consistent with iron deficiency.  Patient has been on ferrous sulfate as outpatient and has showed some slight improvement in his hemoglobin.     SHASHANK (obstructive sleep apnea)   Assessment & Plan    Advised patient to follow up with pulmonology for an outpatient sleep study     Leukocytosis   Assessment & Plan    Chronic, likely secondary to steroid use, has been on empiric antibiotic therapy for possible pneumonia         Consultants:     ICU  Surgery  Orthopedics  Palliative    Procedures:        Thoracentesis - 11/07  Right Chest tube - 11/07    Imaging/ Testing:      DX-CHEST-LIMITED (1 VIEW)   Final Result      1.  Interval removal of a right chest tube without evidence of pneumothorax.      2.  Cardiomegaly with interstitial edema.      EC-ECHOCARDIOGRAM COMPLETE W/O CONT   Final Result      DX-CHEST-2 VIEWS   Final Result      Pulmonary edema is improved compared to prior.      Trace bilateral pleural effusions.      Stable cardiomegaly.      Right chest tube with the side-port at the level of the soft tissues.        Small right apical pneumothorax.      CT-CHEST (THORAX) W/O   Final Result      Small right pneumothorax. Right chest tube terminates in the right middle lobe.      Multifocal patchy and groundglass opacities bilaterally, most prominent in the right middle and right lower lobes. Findings may represent edema or pneumonia. Pulmonary contusion is thought to be less likely. Follow-up to radiographic resolution    recommended.      Trace pleural fluid on the right.      Pleural thickening and nodularity in the posterolateral right thorax deep to the right-sided rib fractures.      Fractures of the right fifth, sixth, seventh, eighth and ninth ribs.      Cardiomegaly.      Atherosclerotic plaque.      Mildly prominent mediastinal lymph nodes are not significantly changed.      Splenomegaly.                  DX-CHEST-PORTABLE (1 VIEW)   Final Result         1. Tiny right apical pneumothorax status post thoracentesis.   2. Significant decrease in right effusion, with reexpansion of the right lung.   3. Diffuse interstitial edema. Small left effusion.      DX-ELBOW-COMPLETE 3+ RIGHT   Final Result      1.  No evidence of acute fracture or dislocation.      2.  Soft tissue swelling.      3.  Mild degenerative change.      4.  Joint effusion.      DX-CHEST-PORTABLE (1 VIEW)   Final Result      1.  Cardiomegaly with pulmonary edema.      2.  Large right pleural effusion with right lung base atelectasis.          Discharge Medications:         Medication Reconciliation: Completed       Medication List      START taking these medications      Instructions   ascorbic acid 500 MG tablet  Start taking on:  11/11/2018  Commonly known as:  VITAMIN C   Take 1 Tab by mouth every day.  Dose:  500 mg     cefdinir 300 MG Caps  Commonly known as:  OMNICEF   Take 1 Cap by mouth every 12 hours for 5 days.  Dose:  300 mg     doxycycline monohydrate 100 MG tablet  Commonly known as:  ADOXA   Take 1 Tab by mouth 2 Times a Day for 5  days.  Dose:  100 mg     lidocaine 5 % Ptch  Start taking on:  11/11/2018  Commonly known as:  LIDODERM   Apply 1 Patch to skin as directed every 24 hours.  Dose:  1 Patch        CHANGE how you take these medications      Instructions   furosemide 40 MG Tabs  What changed:  Another medication with the same name was removed. Continue taking this medication, and follow the directions you see here.  Commonly known as:  LASIX   Take 1 Tab by mouth every day. Start after 7 days, 40 BID for 7 days  Dose:  40 mg     oxyCODONE-acetaminophen 5-325 MG Tabs  What changed:  Another medication with the same name was removed. Continue taking this medication, and follow the directions you see here.  Commonly known as:  PERCOCET   Take 1-2 Tabs by mouth every four hours as needed for Severe Pain for up to 7 days.  Dose:  1-2 Tab     potassium chloride SA 10 MEQ Tbcr  What changed:  Another medication with the same name was removed. Continue taking this medication, and follow the directions you see here.  Commonly known as:  K-DUR   Take 1 Tab by mouth every day. Please take after 7 days with Lasix daily  Dose:  10 mEq        CONTINUE taking these medications      Instructions   acetaminophen 500 MG Tabs  Commonly known as:  TYLENOL   Take 2 Tabs by mouth every 8 hours.  Dose:  1000 mg     albuterol 108 (90 Base) MCG/ACT Aers inhalation aerosol   Inhale 2 Puffs by mouth every 6 hours as needed for Shortness of Breath.  Dose:  2 Puff     amiodarone 200 MG Tabs  Commonly known as:  CORDARONE   Take 1 Tab by mouth every day.  Dose:  200 mg     budesonide-formoterol 160-4.5 MCG/ACT Aero  Commonly known as:  SYMBICORT   Inhale 2 Puffs by mouth 2 Times a Day.  Dose:  2 Puff     calcium carbonate 500 MG Chew  Commonly known as:  TUMS   Take 1 Tab by mouth 2 times a day as needed (Reflux).  Dose:  500 mg     CRESTOR 40 MG tablet  Generic drug:  rosuvastatin   Take 40 mg by mouth every day.  Dose:  40 mg     ferrous sulfate 325 (65 Fe) MG  tablet   Take 1 Tab by mouth every morning with breakfast.  Dose:  325 mg     guaiFENesin  MG Tb12  Commonly known as:  MUCINEX   Take 1 Tab by mouth every 12 hours.  Dose:  600 mg     ipratropium-albuterol 0.5-2.5 (3) MG/3ML nebulizer solution  Commonly known as:  DUONEB   3 mL by Nebulization route every 6 hours as needed for Shortness of Breath.  Dose:  3 mL     lisinopril 2.5 MG Tabs  Commonly known as:  PRINIVIL   Take 1 Tab by mouth every day.  Dose:  2.5 mg     metoprolol SR 25 MG Tb24  Commonly known as:  TOPROL XL   Take 1 Tab by mouth every day.  Dose:  25 mg     predniSONE 10 MG Tabs  Commonly known as:  DELTASONE   Take 1 Tab by mouth every day for 12 days. Take 60mg x 2 days, 50mg x2 days, 40mg x2 days, 30 mg x2 days, 20mg x2 days, 10mg x2 days  Dose:  10 mg     spironolactone 25 MG Tabs  Commonly known as:  ALDACTONE   Take 1 Tab by mouth every day.  Dose:  25 mg     tiotropium 18 MCG Caps  Commonly known as:  SPIRIVA   Inhale 1 Cap by mouth every day.  Dose:  18 mcg     warfarin 5 MG Tabs  Commonly known as:  COUMADIN   Doctor's comments:  Please hold Coumadin for 7 days after discharge, OK to start on 11/17/2018  Take 1 Tab by mouth COUMADIN-DAILY.  Dose:  5 mg        STOP taking these medications    ciprofloxacin 500 MG Tabs  Commonly known as:  CIPRO            Disposition:   Stable, Home    Diet:   Cardiac, Diabetic    Activity:   As tolerated    Instructions:      Please follow up with PCP in 1-2 weeks  Please follow up with Cardiology as scheduled  Please start Coumadin in 7 days     The patient was instructed to return to the ER in the event of worsening symptoms. I have counseled the patient on the importance of compliance and the patient has agreed to proceed with all medical recommendations and follow up plan indicated above.   The patient understands that all medications come with benefits and risks. Risks may include permanent injury or death and these risks can be minimized with  close reassessment and monitoring.        Primary Care Provider:    Nam Le M.D.      Follow up appointment details :      Please follow up with PCP in 1-2 weeks  Please follow up with Cardiology as scheduled  Please start Coumadin in 7 days      Pending Studies:        None      Discharge Time (Minutes) :    Greater than 45 minutes spent on discharge day patient visit, preparing discharge paperwork and arranging for patient follow up.    Hospital Course Type:  Inpatient Stay >2 midnights      Condition on Discharge    ______________________________________________________________________    Interval history/exam for day of discharge:    Stable, states breathing is back to baseline  Chest tube removed      Most Recent Labs:    Lab Results   Component Value Date/Time    WBC 21.3 (H) 11/10/2018 07:02 AM    RBC 4.34 (L) 11/10/2018 07:02 AM    HEMOGLOBIN 8.2 (L) 11/10/2018 07:02 AM    HEMATOCRIT 30.9 (L) 11/10/2018 07:02 AM    MCV 71.2 (L) 11/10/2018 07:02 AM    MCH 18.9 (L) 11/10/2018 07:02 AM    MCHC 26.5 (L) 11/10/2018 07:02 AM    MPV 9.9 11/10/2018 07:02 AM    NEUTSPOLYS 93.80 (H) 11/10/2018 07:02 AM    LYMPHOCYTES 4.40 (L) 11/10/2018 07:02 AM    MONOCYTES 0.90 11/10/2018 07:02 AM    EOSINOPHILS 0.00 11/10/2018 07:02 AM    EOSINOPHILS 2 11/07/2018 09:34 AM    BASOPHILS 0.90 11/10/2018 07:02 AM    HYPOCHROMIA 2+ 11/10/2018 07:02 AM    ANISOCYTOSIS 2+ 11/10/2018 07:02 AM      Lab Results   Component Value Date/Time    SODIUM 131 (L) 11/10/2018 07:02 AM    POTASSIUM 4.7 11/10/2018 07:02 AM    CHLORIDE 100 11/10/2018 07:02 AM    CO2 25 11/10/2018 07:02 AM    GLUCOSE 125 (H) 11/10/2018 07:02 AM    BUN 43 (H) 11/10/2018 07:02 AM    CREATININE 1.32 11/10/2018 07:02 AM      Lab Results   Component Value Date/Time    ALTSGPT 23 11/09/2018 05:10 AM    ASTSGOT 24 11/09/2018 05:10 AM    ALKPHOSPHAT 101 (H) 11/09/2018 05:10 AM    TBILIRUBIN 0.6 11/09/2018 05:10 AM    LIPASE 30 11/03/2018 07:14 PM    ALBUMIN 3.0 (L)  11/09/2018 05:10 AM    GLOBULIN 3.7 (H) 11/09/2018 05:10 AM    PREALBUMIN 10.0 (L) 01/29/2018 03:58 AM    INR 1.34 (H) 11/08/2018 05:19 AM    MACROCYTOSIS 1+ 10/27/2018 04:58 AM     Lab Results   Component Value Date/Time    PROTHROMBTM 16.7 (H) 11/08/2018 05:19 AM    INR 1.34 (H) 11/08/2018 05:19 AM

## 2018-11-10 NOTE — PROGRESS NOTES
Patient discharged. Patient is A&Ox4, provided discharge instructions, removed IV and tele box, and monitor room notified. Patient is going home and is driven by his wife.

## 2018-11-10 NOTE — PROGRESS NOTES
Internal Medicine Interval Note  Note Author: Ashwini Alvarenga M.D.     Name Joshua Medrano    1965   Age, Sex 52 y.o. male   MRN [id number]   Code Status Full Code     After 5PM or if no immediate response to page, please call for cross-coverage  Attending: Dr. Michael  Team: Jose See patient list for primary contact information  Call [phone] to page    1st Call - Day Intern (R1):   Dr. Alvarenga 2nd Call - Day Sr. Resident (R2/R3):   Dr. Champion     Principal Problem  Acute on chronic respiratory failure     Interval Update   NAEO  Status post chest tube removal by general surgery on 18  Continuing to breathe well at rest and with ambulation on home dose of 2 L supplemental O2   Still with R sided chest pain secondary to rib fractures and hematoma at distal R lower arm  Reports doing well overnight, still denying SOB, CP, dizziness, abdominal pain, urinary issues, abnormal bleeding  Endorses productive cough, denies hemoptysis    Vital signs WNL    WBC and left shift down trending  Na 131, (132)  Glucose 125 (110)  Cr 1.32 (1.36)  BUN 43 (41)   GFR 56 (54)  Mg 2.1 (2.1)    ROS  Per updates above     Disposition + Discharge Barriers  None    PCP  Nam Le M.D.    Quality Measures  Ceballos catheter: No  DVT prophylaxis: SCDs for VTE ppx  Reviewed items: Labs, Medications, EKG and Imaging    Physical Exam   Constitutional: He is oriented to person, place, and time and well-developed, well-nourished, and in no distress. No distress.   Throat: oropharynx clear   HENT:   Head: Normocephalic and atraumatic.   Nose: Nose normal.   Eyes: Conjunctivae and EOM are normal. No scleral icterus.   Neck: Normal range of motion. Neck supple. No thyromegaly (skin fold from prior trach) present.   Cardiovascular: Normal rate, regular rhythm and intact distal pulses.    Abdominal: There is no tenderness. There is no rebound and no guarding.   Musculoskeletal: He exhibits no edema, tenderness or deformity.    Neurological: He is alert and oriented to person, place, and time. No cranial nerve deficit. Coordination normal.   Skin: Skin is warm and dry. Rash (skin changes in BL LEs of chronic venous stasis) noted. No erythema. No pallor.   Psychiatric: Mood, memory and affect normal.   Pulmonary: Normal work of breathing, no wheezes, rales or rhonchi, ecchymotic lesions along right lateral thorax unchanged, site of chest tube clean dry and intact    Assessment and Plan     Chronic obstructive pulmonary disease with acute exacerbation (HCC)  Stable off BiPAP, back to home dose of 2 L supplemental O2, chest tube removed 11/9/18 without any complications or respiratory compromise, chest x-ray this morning negative for pneumothorax  Discharged with close PCP follow-up   Recommended to follow-up with pulmonology for and outpt sleep study given concern for sleep apnea    Heart failure with preserved ejection fraction, borderline, class IV (HCC)  Please refer to acute on chronic respiratory failure problem for assessment and plan    Microcytic anemia  Patient has a microcytic anemia, MCV is 71.5.  This is due to iron deficiency, red cell distribution width is elevated and last iron panel consistent with iron deficiency. Patient has been on ferrous sulfate as outpatient and has showed some slight improvement in his hemoglobin.    Acute on chronic respiratory failure (HCC)  History of COPD, on 2 L O2 at home  Breathing well on home dose of 2 L supplemental O2, status post chest tube removal on 11/9/18 without any complications    Paroxysmal atrial fibrillation (HCC)  EKG on admission showed sinus rhythm with controlled heart rate   Stable on amiodarone and off warfarin this admission  Scheduled for close PCP follow-up, advised to resume warfarin 7 days from today (11/10/18)  Repeat coag panel in 1 week    Elevated troponin  Patient had a history of slightly elevated troponin levels in the past, possibly due to his CHF.  EKG did  not reveal any ST segment changes and the patient did not complain of any chest pain or discomfort (other than right-sided chest tenderness secondary to rib fractures) this admission    Pneumonia due to infectious organism  Possible pneumonia or parapneumonic effusion in addition to bleeding  , blood and pleural fluid cultures negative for organisms   Treated empirically with Cefepime in the ICU  Stopped Cefepime 11/9/18, started empiric CAP treatment with cefdinir 300 mg PO BID and doxy 100 mg BID  Will discharge patient with 5 day course of cefdinir 300 mg PO BID and doxy 100 mg BID    SHASHANK (obstructive sleep apnea)  Advised patient to follow up with pulmonology for an outpatient sleep study    Morbid obesity with BMI of 40.0-44.9, adult (HCC)  Advised patient to follow up with his PCP in 5-7 days, recommend addressing options for weight loss at this visit.

## 2018-11-11 LAB — B2 MICROGLOB SERPL-MCNC: 6.1 MG/L (ref 1.1–2.4)

## 2018-11-13 ENCOUNTER — TELEPHONE (OUTPATIENT)
Dept: VASCULAR LAB | Facility: MEDICAL CENTER | Age: 53
End: 2018-11-13

## 2018-11-13 NOTE — DOCUMENTATION QUERY
DOCUMENTATION QUERY    PROVIDERS: Please select “Cosign w/ note”to reply to query.    Americo uBrns,    There is an outstanding query dircted to Dr. Mendoza who assessed this patient on 10/08/18.  Unfortunately Dr. Mendoza will not be returning to [facility] for some time.  Since you were also able to assess this patient on 10/09/18 and ultimately discharge them on 10/10/18, will you please review the following information and exercise your independent professional judgment in responding to this query.     Heart failure present on admission with clinical findings of LVEF as low as 25% before he was placed on dobutamine and last echo with LVEF of 60% are concerning for semi compensated heart failure is documented in the progress note dated 10/8/18.        Based upon the clinical findings, risk factors, and treatment, can this diagnosis of heart failure be further specified?     · Acute Systolic heart failure   · Chronic Systolic heart failure  · Acute on Chronic Systolic heart failure  · Acute Diastolic heart failure   · Chronic Diastolic heart failure  · Acute on Chronic Diastolic heart failure  · Acute Systolic and Diastolic heart failure  · Chronic Systolic and Diastolic heart failure  · Acute on Chronic Systolic and diastolic heart failure  · Other explanation of clinical findings  · Unable to determine    The medical record reflects the following:   Clinical Findings  Heart failure POA without further specification    Treatment  Echo   Daily BNP  DC IV fluids   Restrict fluids and low salt diet    Risk Factors  Gastroenteritis   Acute kidney failure  Drop in hemoglobin   Iron deficiency anemia  Paroxysmal atrial fibrillation   Type 2 diabetes  COPD  Heart disease  Leukocytosis   Morbid obesity   Hypertension    Location within medical record    History and Physical and Progress Notes        Thank you,   Latrice Diane CCA  Coding

## 2018-11-13 NOTE — TELEPHONE ENCOUNTER
Left VM for pt regarding referral to anticoagulation. Asked pt to please call back to establish care.     Khalida Comer, ZakD

## 2018-11-19 ENCOUNTER — TELEPHONE (OUTPATIENT)
Dept: VASCULAR LAB | Facility: MEDICAL CENTER | Age: 53
End: 2018-11-19

## 2018-11-19 NOTE — TELEPHONE ENCOUNTER
Spoke with pt.   He resumed warfarin 5mg daily on Saturday.   Agrees to get INR lab this week.    Wandy Garzon, PharmD

## 2018-11-29 ENCOUNTER — TELEPHONE (OUTPATIENT)
Dept: VASCULAR LAB | Facility: MEDICAL CENTER | Age: 53
End: 2018-11-29

## 2018-12-11 ENCOUNTER — TELEPHONE (OUTPATIENT)
Dept: CARDIOLOGY | Facility: MEDICAL CENTER | Age: 53
End: 2018-12-11

## 2018-12-11 NOTE — TELEPHONE ENCOUNTER
Attempted calling patient prior to appointment as his last appointment was cancelled late due to lack of transportation, left message offering [employer] health ride if necessary. Patient has history of multiple no shows with other offices.

## 2018-12-18 ENCOUNTER — TELEPHONE (OUTPATIENT)
Dept: VASCULAR LAB | Facility: MEDICAL CENTER | Age: 53
End: 2018-12-18

## 2018-12-19 NOTE — TELEPHONE ENCOUNTER
[facility]    Left  requesting pt to call the clinic and establish care.    Noel Vegas, PharmD

## 2019-01-04 ENCOUNTER — TELEPHONE (OUTPATIENT)
Dept: CARDIOLOGY | Facility: MEDICAL CENTER | Age: 54
End: 2019-01-04

## 2019-01-04 ENCOUNTER — OFFICE VISIT (OUTPATIENT)
Dept: CARDIOLOGY | Facility: MEDICAL CENTER | Age: 54
End: 2019-01-04
Payer: MEDICARE

## 2019-01-04 ENCOUNTER — ANTICOAGULATION VISIT (OUTPATIENT)
Dept: VASCULAR LAB | Facility: MEDICAL CENTER | Age: 54
End: 2019-01-04
Attending: INTERNAL MEDICINE
Payer: MEDICARE

## 2019-01-04 VITALS
HEART RATE: 76 BPM | OXYGEN SATURATION: 96 % | HEIGHT: 77 IN | DIASTOLIC BLOOD PRESSURE: 60 MMHG | WEIGHT: 308 LBS | BODY MASS INDEX: 36.37 KG/M2 | SYSTOLIC BLOOD PRESSURE: 110 MMHG

## 2019-01-04 DIAGNOSIS — I50.20 ACC/AHA STAGE C SYSTOLIC HEART FAILURE (HCC): ICD-10-CM

## 2019-01-04 DIAGNOSIS — E78.5 DYSLIPIDEMIA: ICD-10-CM

## 2019-01-04 DIAGNOSIS — R06.02 SOB (SHORTNESS OF BREATH): ICD-10-CM

## 2019-01-04 DIAGNOSIS — I48.91 ATRIAL FIBRILLATION, CONTROLLED (HCC): ICD-10-CM

## 2019-01-04 DIAGNOSIS — I10 HTN (HYPERTENSION), MALIGNANT: ICD-10-CM

## 2019-01-04 DIAGNOSIS — I48.0 PAROXYSMAL ATRIAL FIBRILLATION (HCC): ICD-10-CM

## 2019-01-04 DIAGNOSIS — I50.9 HEART FAILURE, NYHA CLASS 2 (HCC): ICD-10-CM

## 2019-01-04 DIAGNOSIS — I25.5 ISCHEMIC CARDIOMYOPATHY: ICD-10-CM

## 2019-01-04 DIAGNOSIS — I25.10 CORONARY ARTERY DISEASE INVOLVING NATIVE CORONARY ARTERY OF NATIVE HEART WITHOUT ANGINA PECTORIS: ICD-10-CM

## 2019-01-04 LAB — INR PPP: 1.5 (ref 2–3.5)

## 2019-01-04 PROCEDURE — 85610 PROTHROMBIN TIME: CPT

## 2019-01-04 PROCEDURE — 94618 PULMONARY STRESS TESTING: CPT | Performed by: NURSE PRACTITIONER

## 2019-01-04 PROCEDURE — 99214 OFFICE O/P EST MOD 30 MIN: CPT | Mod: 25 | Performed by: NURSE PRACTITIONER

## 2019-01-04 PROCEDURE — 99212 OFFICE O/P EST SF 10 MIN: CPT

## 2019-01-04 RX ORDER — AMIODARONE HYDROCHLORIDE 200 MG/1
200 TABLET ORAL DAILY
Qty: 90 TAB | Refills: 3 | Status: SHIPPED | OUTPATIENT
Start: 2019-01-04 | End: 2019-05-07 | Stop reason: SDUPTHER

## 2019-01-04 RX ORDER — FUROSEMIDE 40 MG/1
40 TABLET ORAL DAILY
Qty: 90 TAB | Refills: 3 | Status: SHIPPED | OUTPATIENT
Start: 2019-01-04 | End: 2019-05-07 | Stop reason: SDUPTHER

## 2019-01-04 RX ORDER — POTASSIUM CHLORIDE 750 MG/1
10 TABLET, EXTENDED RELEASE ORAL DAILY
Qty: 90 TAB | Refills: 3 | Status: SHIPPED | OUTPATIENT
Start: 2019-01-04 | End: 2019-05-07 | Stop reason: SDUPTHER

## 2019-01-04 RX ORDER — SPIRONOLACTONE 25 MG/1
25 TABLET ORAL DAILY
Qty: 90 TAB | Refills: 3 | Status: SHIPPED | OUTPATIENT
Start: 2019-01-04 | End: 2019-05-07 | Stop reason: SDUPTHER

## 2019-01-04 RX ORDER — METOPROLOL SUCCINATE 25 MG/1
25 TABLET, EXTENDED RELEASE ORAL DAILY
Qty: 90 TAB | Refills: 3 | Status: SHIPPED | OUTPATIENT
Start: 2019-01-04 | End: 2019-05-07 | Stop reason: SDUPTHER

## 2019-01-04 RX ORDER — ROSUVASTATIN CALCIUM 40 MG/1
40 TABLET, COATED ORAL DAILY
Qty: 90 TAB | Refills: 3 | Status: SHIPPED | OUTPATIENT
Start: 2019-01-04 | End: 2019-05-07 | Stop reason: SDUPTHER

## 2019-01-04 RX ORDER — LISINOPRIL 2.5 MG/1
2.5 TABLET ORAL DAILY
Qty: 90 TAB | Refills: 3 | Status: SHIPPED | OUTPATIENT
Start: 2019-01-04 | End: 2019-05-07 | Stop reason: SDUPTHER

## 2019-01-04 ASSESSMENT — MINNESOTA LIVING WITH HEART FAILURE QUESTIONNAIRE (MLHF)
DIFFICULTY TO CONCENTRATE OR REMEMBERING THINGS: 0
DIFFICULTY GOING AWAY FROM HOME: 0
MAKING YOU FEEL DEPRESSED: 3
MAKING YOU STAY IN A HOSPITAL: 2
LOSS OF SELF CONTROL IN YOUR LIFE: 4
COSTING YOU MONEY FOR MEDICAL CARE: 1
MAKING YOU SHORT OF BREATH: 3
FEELING LIKE A BURDEN TO FAMILY AND FRIENDS: 4
DIFFICULTY WITH SEXUAL ACTIVITIES: 5
HAVING TO SIT OR LIE DOWN DURING THE DAY: 2
GIVING YOU SIDE EFFECTS FROM TREATMENTS: 0
DIFFICULTY WORKING TO EARN A LIVING: 0
DIFFICULTY SOCIALIZING WITH FAMILY OR FRIENDS: 1
DIFFICULTY SLEEPING WELL AT NIGHT: 3
TOTAL_SCORE: 38
DIFFICULTY WITH RECREATIONAL PASTIMES, SPORTS, HOBBIES: 1
EATING LESS FOODS YOU LIKE: 1
SWELLING IN ANKLES OR LEGS: 1
TIRED, FATIGUED OR LOW ON ENERGY: 1
WORKING AROUND THE HOUSE OR YARD DIFFICULT: 1
WALKING ABOUT OR CLIMBING STAIRS DIFFICULT: 1
MAKING YOU WORRY: 4

## 2019-01-04 ASSESSMENT — ENCOUNTER SYMPTOMS
ORTHOPNEA: 0
DIZZINESS: 0
ABDOMINAL PAIN: 0
CLAUDICATION: 0
SHORTNESS OF BREATH: 1
FEVER: 0
COUGH: 0
PALPITATIONS: 0
MYALGIAS: 0
PND: 0

## 2019-01-04 ASSESSMENT — 6 MINUTE WALK TEST (6MWT): TOTAL DISTANCE WALKED (METERS): 146

## 2019-01-04 NOTE — PROGRESS NOTES
Chief Complaint   Patient presents with   • Congestive Heart Failure     Follow up       Subjective:   Joshua Medrano is a 53 y.o. male who presents today for follow-up on his heart failure with his daughter, Hollie and wife, Lisa.    Patient of the heart failure clinic.  Patient was last seen in clinic on 4/6/2018.  During his last visit, carvedilol was increased to 6.25 mg twice daily.    Since his last visit, he has had several hospitalizations due to various issues.    -Hospitalization 7/24/18 through 7/27/18: GI bleed and hemorrhagic shock  -Hospitalization from 8/21/18 through 8/22/18: Shortness of breath and swelling, admitted for suspected cellulitis and COPD exacerbation, patient was treated with antibiotics, and diuresed.  -ER on 9/9/2018 for abdominal bloating, received a dose of Lasix  -Hospitalization from 10/7/18 through 10/10/18: Diarrhea and ground-level fall, patient was treated with iron and 1 pack of PRBCs for anemia, diarrhea resolved  -Hospitalization from 10/27/18 through 10/31/18: Respiratory failure due to COPD exacerbation, suffered a trauma after falling off the gurney from EMS, causing rib fracture and pneumothorax  -Hospitalization from 11/7/18 through 11/10/18: Respiratory failure, requiring BiPAP, underwent a thoracentesis and chest tube placement    Since his last visit, his medications have been changed.  Patient continues to report right sided rib pain, right arm pain (patient is being followed by sports medicine for this) and some mild lower extremity edema, shortness of breath with exertion.  He denies chest pain, palpitations, orthopnea, PND, dizziness/lightheadedness or fatigue.    Patient reports his home weights have been stable between 285-291 pounds.    Patient does smoke cigars.  He has quit cigarettes.    At 6 minute walk test evaluation, patient was able to complete 146 m during his 6 minute walk test.  Patient was only able to complete 2 minutes 18 minutes of his  walk test, and stop due to rib pain.  His O2 saturation at baseline was 96% and at the end of the test, the O2 saturation was 96%. he reported level 1 of dyspnea on Elsa scale. [id number]    Additonally, patient has the following medical problems:     -CAD, CABG ×2 (LIMA to LAD, RSVG Major Obtuse [place]) on 12/22/17, with respiratory complications     -Atrial fibrillation: Post operative, Taking amiodarone and warfarin, followed by anticoagulation clinic      -Uses 2 L oxygen at night     -Diabetes: Diet controlled    -COPD    -Current smoker  Past Medical History:   Diagnosis Date   • ASTHMA    • Atrial fibrillation (HCC)    • CAD (coronary artery disease)    • Chronic obstructive pulmonary disease (HCC)    • Congestive heart failure (HCC)    • Diabetes      Past Surgical History:   Procedure Laterality Date   • COLONOSCOPY - ENDO N/A 7/25/2018    Procedure: COLONOSCOPY - ENDO;  Surgeon: Josiah Molina D.O.;  Location: ENDOSCOPY [facility];  Service: Gastroenterology   • THORACOSCOPY Left 1/25/2018    Procedure: THORACOSCOPY- PLEURODESIS ;  Surgeon: Chandu Paez M.D.;  Location: SURGERY [facility];  Service: General   • MULTIPLE CORONARY ARTERY BYPASS ENDO VEIN HARVEST  12/22/2017    Procedure: MULTIPLE CORONARY ARTERY BYPASS ENDO VEIN HARVEST X2;  Surgeon: Kiel Ash M.D.;  Location: SURGERY [facility];  Service: Cardiothoracic   • JIM  12/22/2017    Procedure: JIM;  Surgeon: Kiel Ash M.D.;  Location: SURGERY [facility];  Service: Cardiothoracic   • OTHER ABDOMINAL SURGERY       Family History   Problem Relation Age of Onset   • Diabetes Mother    • Stroke Mother    • Leukemia Mother    • Diabetes Father    • No Known Problems Sister    • Heart Disease Brother         PPM   • Lung Disease Brother    • Alcohol/Drug Brother    • Diabetes Sister      Social History     Social History   • Marital status:      Spouse name: N/A   • Number of children: N/A   • Years of  education: N/A     Occupational History   • Not on file.     Social History Main Topics   • Smoking status: Current Some Day Smoker     Packs/day: 0.00     Years: 30.00     Types: Cigarettes   • Smokeless tobacco: Never Used      Comment: 1/2-1.5 packs for 30 years   • Alcohol use No   • Drug use: No   • Sexual activity: Not on file     Other Topics Concern   • Not on file     Social History Narrative   • No narrative on file     Allergies   Allergen Reactions   • Cillins [Penicillins] Anaphylaxis and Rash     As a child, tolerated cefepime (12/2017), ceftriaxone (1/2018), cefazolin (12/2017)   • Erythromycin Anaphylaxis     Rxn - 1994   • Metoprolol      Pt stated got latham and aggressive, in demi dose's per wife.      • Shellfish Allergy Rash     Pt stated that he is allergic to all seafood, will develop a rash and says that rash will clear up with benadryl     Outpatient Encounter Prescriptions as of 1/4/2019   Medication Sig Dispense Refill   • metoprolol SR (TOPROL XL) 25 MG TABLET SR 24 HR TAKE 1 TABLET BY MOUTH ONCE DAILY 30 Tab 3   • spironolactone (ALDACTONE) 25 MG Tab TAKE 1 TABLET BY MOUTH ONCE DAILY 30 Tab 3   • lisinopril (PRINIVIL) 2.5 MG Tab TAKE 1 TABLET BY MOUTH ONCE DAILY 30 Tab 3   • warfarin (COUMADIN) 5 MG Tab Take 1 Tab by mouth COUMADIN-DAILY. Re-start warfarin 7 days from now on 11/17/18 30 Tab 1   • furosemide (LASIX) 40 MG Tab Take 1 Tab by mouth every day. 30 Tab 2   • polyethylene glycol/lytes (MIRALAX) Pack Take 1 Packet by mouth 1 time daily as needed (for constipation). 7 Each 0   • albuterol 108 (90 Base) MCG/ACT Aero Soln inhalation aerosol Inhale 2 Puffs by mouth every 6 hours as needed for Shortness of Breath. 1 Inhaler 15   • ferrous sulfate 325 (65 Fe) MG tablet Take 1 Tab by mouth every morning with breakfast. 30 Tab 3   • acetaminophen (TYLENOL) 500 MG Tab Take 2 Tabs by mouth every 8 hours. 30 Tab 0   • tiotropium (SPIRIVA) 18 MCG Cap Inhale 1 Cap by mouth every day. 30 Cap 5  "  • potassium chloride SA (K-DUR) 10 MEQ Tab CR Take 1 Tab by mouth every day. Please take after 7 days with Lasix daily 30 Tab 1   • ipratropium-albuterol (DUONEB) 0.5-2.5 (3) MG/3ML nebulizer solution 3 mL by Nebulization route every 6 hours as needed for Shortness of Breath. 180 Bullet 10   • rosuvastatin (CRESTOR) 40 MG tablet Take 40 mg by mouth every day.     • amiodarone (CORDARONE) 200 MG Tab Take 1 Tab by mouth every day. 90 Tab 3   • ascorbic acid (VITAMIN C) 500 MG tablet Take 1 Tab by mouth every day. (Patient not taking: Reported on 1/4/2019) 30 Tab 3   • lidocaine (LIDODERM) 5 % Patch Apply 1 Patch to skin as directed every 24 hours. (Patient not taking: Reported on 1/4/2019) 10 Patch 1   • calcium carbonate (TUMS) 500 MG Chew Tab Take 1 Tab by mouth 2 times a day as needed (Reflux). 30 Tab 0   • guaiFENesin LA (MUCINEX) 600 MG TABLET SR 12 HR Take 1 Tab by mouth every 12 hours. (Patient not taking: Reported on 1/4/2019) 28 Tab 5   • budesonide-formoterol (SYMBICORT) 160-4.5 MCG/ACT Aerosol Inhale 2 Puffs by mouth 2 Times a Day.       No facility-administered encounter medications on file as of 1/4/2019.      Review of Systems   Constitutional: Negative for fever and malaise/fatigue.   Respiratory: Positive for shortness of breath (with exertion). Negative for cough.    Cardiovascular: Positive for leg swelling. Negative for chest pain, palpitations, orthopnea, claudication and PND.   Gastrointestinal: Negative for abdominal pain.   Musculoskeletal: Negative for myalgias.        Right rib area and right arm   Neurological: Negative for dizziness.   All other systems reviewed and are negative.       Objective:   /60 (BP Location: Left arm, Patient Position: Sitting, BP Cuff Size: Adult)   Pulse 76   Ht 1.956 m (6' 5\")   Wt (!) 139.7 kg (308 lb)   SpO2 96%   BMI 36.52 kg/m²     Physical Exam   Constitutional: He is oriented to person, place, and time. He appears well-developed and " well-nourished.   HENT:   Head: Normocephalic and atraumatic.   Eyes: Pupils are equal, round, and reactive to light. EOM are normal.   Neck: Normal range of motion. Neck supple. No JVD present.   Cardiovascular: Normal rate, regular rhythm and normal heart sounds.    Pulmonary/Chest: Effort normal and breath sounds normal. No respiratory distress. He has no wheezes. He has no rales.   Abdominal: Soft. Bowel sounds are normal.   Musculoskeletal: He exhibits edema (Bilateral 1+ lower extremity edema, left greater than right).   Neurological: He is alert and oriented to person, place, and time.   Skin: Skin is warm and dry.   Psychiatric: He has a normal mood and affect. His behavior is normal.   Vitals reviewed.    Lab Results   Component Value Date/Time    CHOLSTRLTOT 77 (L) 10/27/2018 04:41 PM    LDL 40 10/27/2018 04:41 PM    HDL 23 (A) 10/27/2018 04:41 PM    TRIGLYCERIDE 72 10/27/2018 04:41 PM       Lab Results   Component Value Date/Time    SODIUM 131 (L) 11/10/2018 07:02 AM    POTASSIUM 4.7 11/10/2018 07:02 AM    CHLORIDE 100 11/10/2018 07:02 AM    CO2 25 11/10/2018 07:02 AM    GLUCOSE 125 (H) 11/10/2018 07:02 AM    BUN 43 (H) 11/10/2018 07:02 AM    CREATININE 1.32 11/10/2018 07:02 AM     Lab Results   Component Value Date/Time    ALKPHOSPHAT 101 (H) 11/09/2018 05:10 AM    ASTSGOT 24 11/09/2018 05:10 AM    ALTSGPT 23 11/09/2018 05:10 AM    TBILIRUBIN 0.6 11/09/2018 05:10 AM      Transthoracic Echo Report 11/19/17  TDS - Body Habitus  No prior study is available for comparison.   Normal left ventricular chamber size.  Mild concentric left ventricular hypertrophy.  Left ventricular ejection fraction is visually estimated to be 35%.  Global hypokinesis with regional variation.  Severely dilated right ventricle.  Reduced right ventricular systolic function.  Mildly dilated left atrium.  Enlarged right atrium.  Possible low grade, low flow stenosis.  Mild mitral regurgitation.  Mild tricuspid  regurgitation.  Estimated right ventricular systolic pressure  is 35 mmHg.  Mild pulmonic insufficiency.  Normal pericardium without effusion.  Ascending aorta diameter is 3.6 cm.     Heart Cath 11/23/17  IMPRESSION:  1.  Coronary artery disease with complete occlusion of the proximal right   coronary artery with left to right-sided collaterals.  2.  An 80% or greater lesion in the proximal circumflex.  3.  Eccentric 40% lesion in the proximal LAD followed by a spontaneous LAD   dissection.  FFR with probe in the distal LAD reveals FFR of 0.5 prior to   adenosine administration.  4.  Elevated left ventricular end diastolic pressure of 32.  5.  Hypokinesis of the anterior and inferior walls of the left ventricle.    Ejection fraction calculated at 38%.       Transesophageal Echo Report 12/22/17  Intraoperative JIM during CABG.  Severely reduced LV systolic function with EF = 15%.  No significant valvular disease.  Findings preserved post CPB.      CABG x 2: 12/22/2017   PREOPERATIVE DIAGNOSES:  Coronary artery disease and congestive heart failure.     POSTOPERATIVE DIAGNOSES:  Coronary artery disease and congestive heart failure.     PROCEDURES PERFORMED:  Coronary artery bypass grafting x2 with left internal mammary to left anterior descending and reverse saphenous vein graft to the major obtuse marginal branch, temporary right ventricular and right atrial pacing leads, extracorporeal circulation, endoscopic saphenous vein harvesting and placement of intraaortic balloon pump    Transthoracic Echo Report 1/2/18  Left ventricle is severely dilated.  Left ventricular ejection fraction is visually estimated to be 25-30%.  Global hypokinesis with regional variation.  Aortic sclerosis without stenosis.  Mild aortic insufficiency.  Mild mitral regurgitation.  Rhythm appears to be atrial fibrillation or atrial flutter.     Transthoracic Echo Report 7/25/2018  Limited study to evaluate LV function. The patient was on  dobutamine 10mcg/kg/min at the time of this study  Grossly normal left ventricular systolic function.  Left ventricular ejection fraction is visually estimated to be 60%.  Wall motion not well seen, but appeared normal.    Compared to the previous echocardiogram performed 1/2/2018: the LVEF has improved from 25% to 60%. The patient is now on dobutamine.      Transthoracic Echo Report 10/10/2018  Prior study done 7/25/18: compared to the report of the study done - there has been no changes of LVSF, mitral stenosis noted.   Normal left ventricular systolic function.  Left ventricular ejection fraction is visually estimated to be 55%.  Mild mitral stenosis.  Mean transvalvular gradient is 5.9 mmHg  Moderate tricuspid regurgitation.  Right ventricular systolic pressure is estimated to be 65 mmHg.  Mild pulmonic insufficiency.       Transthoracic Echo Report 11/8/2018  Technically difficult study incomplete information is obtained.   Mild concentric left ventricular hypertrophy.  Normal left ventricular systolic function.  Left ventricular ejection fraction is visually estimated to be 55%.  Wall motion is difficult to assess with the limitations of image quality.  Enlarged right atrium.  Severely dilated right ventricle.  Mitral annular calcification.  Mild mitral stenosis.    Mild mitral regurgitation.  Mild aortic stenosis.  Transvalvular gradients are - Peak: 35  mmHg, Mean:19  mmHg.  Moderate tricuspid regurgitation.  Right ventricular systolic pressure is estimated to be  65 mmHg.  Normal pericardium without effusion.    Assessment:     1. ACC/AHA stage C systolic heart failure (HCC)  spironolactone (ALDACTONE) 25 MG Tab    potassium chloride SA (K-DUR) 10 MEQ Tab CR    metoprolol SR (TOPROL XL) 25 MG TABLET SR 24 HR    lisinopril (PRINIVIL) 2.5 MG Tab    furosemide (LASIX) 40 MG Tab    BASIC METABOLIC PANEL   2. Heart failure, NYHA class 2 (HCC)  spironolactone (ALDACTONE) 25 MG Tab    potassium chloride SA (K-DUR)  10 MEQ Tab CR    metoprolol SR (TOPROL XL) 25 MG TABLET SR 24 HR    lisinopril (PRINIVIL) 2.5 MG Tab    furosemide (LASIX) 40 MG Tab    BASIC METABOLIC PANEL   3. Ischemic cardiomyopathy  spironolactone (ALDACTONE) 25 MG Tab    rosuvastatin (CRESTOR) 40 MG tablet    metoprolol SR (TOPROL XL) 25 MG TABLET SR 24 HR    lisinopril (PRINIVIL) 2.5 MG Tab    furosemide (LASIX) 40 MG Tab    BASIC METABOLIC PANEL   4. Coronary artery disease involving native coronary artery of native heart without angina pectoris  rosuvastatin (CRESTOR) 40 MG tablet    BASIC METABOLIC PANEL   5. Dyslipidemia  rosuvastatin (CRESTOR) 40 MG tablet    BASIC METABOLIC PANEL   6. Atrial fibrillation, controlled (HCC)  amiodarone (CORDARONE) 200 MG Tab    BASIC METABOLIC PANEL   7. HTN (hypertension), malignant  spironolactone (ALDACTONE) 25 MG Tab    BASIC METABOLIC PANEL   8. SOB (shortness of breath)         Medical Decision Making:  Today's Assessment / Status / Plan:   1. HFrEF, Stage C, Class 2, LVEF 55% improved from 25% - 30%: Patient continues to have mild lower extremity edema  -Continue spironolactone 25 mg daily  -Continue lisinopril 2.5 mg daily  -Continue Toprol-XL 25 mg daily  -Continue furosemide 40 mg daily  -Continue potassium 10 mEq daily  -Repeat BMP in 2 months  -Reinforced s/sx of worsening heart failure with patient and weight monitoring. Pt verbalizes understanding. Pt to call office or RTC if present.     2.  CAD, history of CABG x2/dyslipidemia: Last LDL 40 on 10/27/2018  -Continue rosuvastatin 40 mg daily    3.  Postoperative A. Fib:  -Continue amiodarone 200 mg daily  -Continue warfarin, followed by the anticoagulation clinic (patient to get checked today)    4.  Hypertension: Stable  -Continue recommendations per above    5.  COPD:  -Continue inhalers  -Continue follow-up with PCP    FU in clinic in 4-5 months with Dr. Bedoya. Sooner if needed.    Patient verbalizes understanding and agrees with the plan of care.      Collaborating MD: Dmitriy Samuels MD

## 2019-01-04 NOTE — PROGRESS NOTES
Anticoagulation Summary  As of 1/4/2019    INR goal:   2.0-3.0   TTR:   40.4 % (5.3 mo)   Today's INR:   1.5!   Warfarin maintenance plan:   7.5 mg (5 mg x 1.5) on Mon, Wed, Fri; 5 mg (5 mg x 1) all other days   Weekly warfarin total:   42.5 mg   Plan last modified:   Marcos Boggs PharmD (1/4/2019)   Next INR check:   1/11/2019   Target end date:   Indefinite    Indications    Paroxysmal atrial fibrillation (HCC) [I48.0]  Atrial flutter with rapid ventricular response (HCC) (Resolved) [I48.92]             Anticoagulation Episode Summary     INR check location:       Preferred lab:       Send INR reminders to:       Comments:   [facility]      Anticoagulation Care Providers     Provider Role Specialty Phone number    Jett Bedoya M.D. Referring Cardiology [phone]    [facility] Responsible  [phone]        Anticoagulation Patient Findings      HPI:  Joshua Medrano seen in clinic today for follow up on anticoagulation therapy in the presence of Afib. Denies any changes to current medical/health status since last appointment. Denies any medication or diet changes. No current symptoms of bleeding or thrombosis reported.    A/P:   INR is sub-therapeutic at 1.5, patient has been taking 5 mg daily since hospitalization 11/10/2017.  Previous dosing on calender will be a ~50% increase this might lead to an overcorrection.    Patient to bolus tonight with 10 mg, then increase weekly regimen by ~13%.    Patient cannot afford Eliquis and will like to stay on warfarin    Follow up appointment in 1 week(s).    Next Appointment: Friday , 01/11/2019     Zak Lebron.D

## 2019-01-05 NOTE — TELEPHONE ENCOUNTER
Pharmacy wants call back about drug interaction   Received: Today   Message Contents   KAY Thompson/Ina     Please call Sera at [facility] at [phone] about drug interaction with Spironolactone and Potassium.      Returned call. Informed another pharm tech that APRN is aware and authorizes Rxs. Pt has been on medications per his report for some time.

## 2019-01-07 LAB — INR BLD: 1.5 (ref 0.9–1.2)

## 2019-01-15 ENCOUNTER — ANTICOAGULATION MONITORING (OUTPATIENT)
Dept: MEDICAL GROUP | Facility: MEDICAL CENTER | Age: 54
End: 2019-01-15

## 2019-01-15 DIAGNOSIS — I48.0 PAROXYSMAL ATRIAL FIBRILLATION (HCC): ICD-10-CM

## 2019-01-15 LAB — INR PPP: 1.7 (ref 2–3.5)

## 2019-01-15 NOTE — PROGRESS NOTES
Anticoagulation Summary  As of 1/15/2019    INR goal:   2.0-3.0   TTR:   37.8 % (5.6 mo)   Today's INR:   1.7!   Warfarin maintenance plan:   7.5 mg (5 mg x 1.5) on Mon, Wed, Fri; 5 mg (5 mg x 1) all other days   Weekly warfarin total:   42.5 mg   Plan last modified:   Marcos Boggs, PharmD (1/4/2019)   Next INR check:      Target end date:   Indefinite    Indications    Paroxysmal atrial fibrillation (HCC) [I48.0]  Atrial flutter with rapid ventricular response (HCC) (Resolved) [I48.92]             Anticoagulation Episode Summary     INR check location:       Preferred lab:       Send INR reminders to:       Comments:   Jia KIM      Anticoagulation Care Providers     Provider Role Specialty Phone number    Jett Bedoya M.D. Referring Cardiology [phone]    [facility] Anticoagulation Services Responsible  [phone]        Anticoagulation Patient Findings      Spoke with patient's wife to report a SUBtherapeutic INR.    Pt's wife reports that the patient has been taking 5 mg every day, except when he took 10 mg on 1/5/19 and 7.5 mg on 1/6/19-1/8/19.  Pt instructed to start the intended warfarin dosing regimen of 7.5 mg on Mon Wed Fri; 5 mg all other days, confirms dosing.   Pt denies any s/s of bleeding, bruising, clotting or any changes to diet or medication.    Will follow up in 1 week.    Discussed with Shira Monaco, Pharmacy Intern

## 2019-01-22 ENCOUNTER — ANTICOAGULATION MONITORING (OUTPATIENT)
Dept: MEDICAL GROUP | Facility: MEDICAL CENTER | Age: 54
End: 2019-01-22

## 2019-01-22 DIAGNOSIS — I48.0 PAROXYSMAL ATRIAL FIBRILLATION (HCC): ICD-10-CM

## 2019-01-22 LAB — INR PPP: 1.6 (ref 2–3.5)

## 2019-01-22 NOTE — PROGRESS NOTES
Anticoagulation Summary  As of 1/22/2019    INR goal:   2.0-3.0   TTR:   36.3 % (5.9 mo)   Today's INR:   1.6!   Warfarin maintenance plan:   5 mg (5 mg x 1) on Mon, Wed, Fri; 7.5 mg (5 mg x 1.5) all other days   Weekly warfarin total:   45 mg   Plan last modified:   Josiah Rodrigez, Pharmacy Intern (1/22/2019)   Next INR check:   1/29/2019   Target end date:   Indefinite    Indications    Paroxysmal atrial fibrillation (HCC) [I48.0]  Atrial flutter with rapid ventricular response (HCC) (Resolved) [I48.92]             Anticoagulation Episode Summary     INR check location:       Preferred lab:       Send INR reminders to:       Comments:   Jia KIM      Anticoagulation Care Providers     Provider Role Specialty Phone number    Jett Bedoya M.D. Referring Cardiology [phone]    [facility] Anticoagulation Services Responsible  [phone]        Anticoagulation Patient Findings      Spoke with patient's wife Lisa.  INR is sub therapeutic.   Pt denies any unusual s/s of bleeding, bruising, clotting or any changes to diet or medications. Denies any etoh, cranberries, supplements, or illness.   Pt verifies warfarin weekly dosing.   Clot hx none    Will have pt bolus tonight's dose and take 10 mg (5 mg x 2) then increase the weekly warfarin regimen by 5.9% and have the patient take 5 mg on Mon, Wed, Fri and 7.5 mg all other days.     Repeat INR in 1 week(s).     Discussed with [facility] Shira Rodrigez, Pharmacy Intern

## 2019-01-29 ENCOUNTER — ANTICOAGULATION MONITORING (OUTPATIENT)
Dept: VASCULAR LAB | Facility: MEDICAL CENTER | Age: 54
End: 2019-01-29

## 2019-01-29 DIAGNOSIS — I48.0 PAROXYSMAL ATRIAL FIBRILLATION (HCC): ICD-10-CM

## 2019-01-29 LAB — INR PPP: 1.8 (ref 2–3.5)

## 2019-01-30 NOTE — PROGRESS NOTES
Anticoagulation Summary  As of 1/29/2019    INR goal:   2.0-3.0   TTR:   34.9 % (6.1 mo)   Today's INR:   1.8!   Warfarin maintenance plan:   5 mg (5 mg x 1) on Mon, Fri; 7.5 mg (5 mg x 1.5) all other days   Weekly warfarin total:   47.5 mg   Plan last modified:   Briana Roberson (1/29/2019)   Next INR check:   2/5/2019   Target end date:   Indefinite    Indications    Paroxysmal atrial fibrillation (HCC) [I48.0]  Atrial flutter with rapid ventricular response (HCC) (Resolved) [I48.92]             Anticoagulation Episode Summary     INR check location:       Preferred lab:       Send INR reminders to:       Comments:   Jia KIM      Anticoagulation Care Providers     Provider Role Specialty Phone number    Jett Bedoya M.D. Referring Cardiology [phone]    [facility] Anticoagulation Services Responsible  [phone]        Anticoagulation Patient Findings    Spoke with Joshua to report a sub therapeutic INR of 1.8.  Will bolus dose with 10mg tonight, and increase weekly dose by 7%. Follow up in 1 weeks, to reduce the risk of adverse events related to this high risk medication, warfarin.    Briana Roberson, Clinical Pharmacist

## 2019-02-05 ENCOUNTER — ANTICOAGULATION MONITORING (OUTPATIENT)
Dept: VASCULAR LAB | Facility: MEDICAL CENTER | Age: 54
End: 2019-02-05

## 2019-02-05 DIAGNOSIS — I48.0 PAROXYSMAL ATRIAL FIBRILLATION (HCC): ICD-10-CM

## 2019-02-05 LAB — INR PPP: 1.7 (ref 2–3.5)

## 2019-02-06 ENCOUNTER — OFFICE VISIT (OUTPATIENT)
Dept: INTERNAL MEDICINE | Facility: MEDICAL CENTER | Age: 54
End: 2019-02-06
Payer: MEDICARE

## 2019-02-06 VITALS
TEMPERATURE: 98 F | WEIGHT: 315 LBS | BODY MASS INDEX: 37.19 KG/M2 | HEIGHT: 77 IN | DIASTOLIC BLOOD PRESSURE: 68 MMHG | OXYGEN SATURATION: 96 % | HEART RATE: 68 BPM | SYSTOLIC BLOOD PRESSURE: 116 MMHG

## 2019-02-06 DIAGNOSIS — I10 ESSENTIAL HYPERTENSION: ICD-10-CM

## 2019-02-06 DIAGNOSIS — J44.9 CHRONIC OBSTRUCTIVE PULMONARY DISEASE, UNSPECIFIED COPD TYPE (HCC): ICD-10-CM

## 2019-02-06 DIAGNOSIS — D22.9 NEVUS: ICD-10-CM

## 2019-02-06 DIAGNOSIS — D50.9 IRON DEFICIENCY ANEMIA, UNSPECIFIED IRON DEFICIENCY ANEMIA TYPE: ICD-10-CM

## 2019-02-06 PROCEDURE — 99213 OFFICE O/P EST LOW 20 MIN: CPT | Mod: GE | Performed by: INTERNAL MEDICINE

## 2019-02-06 ASSESSMENT — PATIENT HEALTH QUESTIONNAIRE - PHQ9
CLINICAL INTERPRETATION OF PHQ2 SCORE: 3
SUM OF ALL RESPONSES TO PHQ QUESTIONS 1-9: 4
5. POOR APPETITE OR OVEREATING: 0 - NOT AT ALL

## 2019-02-06 NOTE — PROGRESS NOTES
Established Patient    Joshua presents today with the following:    CC:  hypertension, COPD, iron deficiency anemia, nevus    HPI:   52 y/o male with complex past medical history presents for follow up on hypertension, COPD, and iron deficiency anemia and complains of nevus on right shoulder, requesting dermatology referral.      Please see assessment and plan below for discussion of patients medical condition.     Patient Active Problem List    Diagnosis Date Noted   • Pneumothorax 10/27/2018     Priority: High   • Chronic obstructive pulmonary disease with acute exacerbation ([facility]) 11/19/2017     Priority: High   • Pneumonia due to infectious organism 11/19/2017     Priority: High   • Paroxysmal atrial fibrillation ([facility]) 02/06/2018     Priority: Medium   • Heart failure with preserved ejection fraction, borderline, class IV ([facility]) 02/06/2018     Priority: Medium   • CAD (coronary artery disease) 12/22/2017     Priority: Medium   • Acute exacerbation of CHF (congestive heart failure) ([facility]) 11/23/2017     Priority: Medium   • Elevated troponin 11/07/2018     Priority: Low   • Closed fracture of multiple ribs of right side 10/27/2018     Priority: Low   • Morbid obesity with BMI of 40.0-44.9, adult ([facility]) 10/27/2018     Priority: Low   • Forearm abrasion, right, initial encounter 10/27/2018     Priority: Low   • Microcytic anemia 10/07/2018     Priority: Low   • Cigarette nicotine dependence with nicotine-induced disorder 07/24/2018     Priority: Low   • CKD (chronic kidney disease), stage III ([facility]) 07/24/2018     Priority: Low   • Dyslipidemia 07/24/2018     Priority: Low   • SHASHANK (obstructive sleep apnea) 07/24/2018     Priority: Low   • Class 2 severe obesity due to excess calories with serious comorbidity and body mass index (BMI) of 36.0 to 36.9 in adult ([facility]) 07/24/2018     Priority: Low   • Essential hypertension 02/12/2018     Priority: Low   • Normocytic anemia 02/06/2018     Priority: Low   • Leukocytosis  01/24/2018     Priority: Low   • Type 2 diabetes mellitus with complication, without long-term current use of insulin ([facility]) 11/20/2017     Priority: Low   • History of stroke 11/19/2017     Priority: Low   • Cellulitis of abdominal wall 08/21/2018   • GI bleed 08/21/2018   • Tracheostomy care ([facility]) 02/06/2018   • NSTEMI (non-ST elevated myocardial infarction) ([facility]) 11/19/2017       Current Outpatient Prescriptions   Medication Sig Dispense Refill   • spironolactone (ALDACTONE) 25 MG Tab Take 1 Tab by mouth every day. 90 Tab 3   • rosuvastatin (CRESTOR) 40 MG tablet Take 1 Tab by mouth every day. 90 Tab 3   • potassium chloride SA (K-DUR) 10 MEQ Tab CR Take 1 Tab by mouth every day. 90 Tab 3   • metoprolol SR (TOPROL XL) 25 MG TABLET SR 24 HR Take 1 Tab by mouth every day. 90 Tab 3   • lisinopril (PRINIVIL) 2.5 MG Tab Take 1 Tab by mouth every day. 90 Tab 3   • furosemide (LASIX) 40 MG Tab Take 1 Tab by mouth every day. 90 Tab 3   • amiodarone (CORDARONE) 200 MG Tab Take 1 Tab by mouth every day. 90 Tab 3   • warfarin (COUMADIN) 5 MG Tab Take 1 Tab by mouth COUMADIN-DAILY. Re-start warfarin 7 days from now on 11/17/18 30 Tab 1   • albuterol 108 (90 Base) MCG/ACT Aero Soln inhalation aerosol Inhale 2 Puffs by mouth every 6 hours as needed for Shortness of Breath. 1 Inhaler 15   • ferrous sulfate 325 (65 Fe) MG tablet Take 1 Tab by mouth every morning with breakfast. 30 Tab 3   • acetaminophen (TYLENOL) 500 MG Tab Take 2 Tabs by mouth every 8 hours. 30 Tab 0   • tiotropium (SPIRIVA) 18 MCG Cap Inhale 1 Cap by mouth every day. 30 Cap 5   • ipratropium-albuterol (DUONEB) 0.5-2.5 (3) MG/3ML nebulizer solution 3 mL by Nebulization route every 6 hours as needed for Shortness of Breath. 180 Bullet 10   • polyethylene glycol/lytes (MIRALAX) Pack Take 1 Packet by mouth 1 time daily as needed (for constipation). (Patient not taking: Reported on 2/6/2019) 7 Each 0     No current facility-administered medications for this visit.   "      ROS: As per HPI. Additional pertinent systems as noted below.  Constitutional: Negative for chills and fever.   HENT: Negative for ear pain and sore throat.    Eyes: Negative for discharge and redness.   Respiratory: Negative for persistent cough, hemoptysis, wheezing and stridor.    Cardiovascular: Negative for chest pain, palpitations and leg swelling.   Gastrointestinal: Negative for abdominal pain, constipation, diarrhea, heartburn, nausea and vomiting.   Genitourinary: Negative for dysuria, flank pain and hematuria.   Musculoskeletal: Negative for falls and myalgias.   Skin: Negative for itching and rash.   Neurological: Negative for dizziness, seizures, loss of consciousness and headaches.   Endo/Heme/Allergies: Negative for polydipsia. Does not bruise/bleed easily.   Psychiatric/Behavioral: Negative for substance abuse and suicidal ideas.     /68 (BP Location: Left arm, Patient Position: Sitting, BP Cuff Size: Large adult)   Pulse 68   Temp 36.7 °C (98 °F) (Temporal)   Ht 1.956 m (6' 5\")   Wt (!) 143.8 kg (317 lb 2 oz)   SpO2 96%   BMI 37.61 kg/m²     Physical Exam   Constitutional:  oriented to person, place, and time. No distress.   Eyes: Pupils are equal, round, and reactive to light. No scleral icterus.  Neck: Neck supple. No thyromegaly present.   Cardiovascular: Normal rate, regular rhythm and normal heart sounds.  Exam reveals no gallop and no friction rub.  systolic murmur heard.  Pulmonary/Chest: Breath sounds slightly diminished bilaterally. No wheezes or crackles.   Musculoskeletal:   Edema   Lymphadenopathy: no cervical adenopathy  Neurological: alert and oriented to person, place, and time.   Skin: No cyanosis. Nails show no clubbing.  2cm oval lesion with irregular border and small are of roughsurface with variation in pigment throughout the lesion.     Note: I have reviewed all pertinent labs and diagnostic tests associated with this visit with specific comments listed under " the assessment and plan below    Assessment and Plan    1. Nevus  Patient has had longstanding, right shoulder large nevus for the past few years. Spouse and patient do not note significant change in size, but do note that it feels much more irregular than in the past.      Referral to dermatology placed.     2. Essential hypertension  Controlled, stable on multiple anti hypertensive's. Denies symptoms of chest pain, SOB, dizziness/light headedness. Follows up with heart failure clinic regularly as well as cardiology.     Continue current regimen. Target BP of less than 130/80. BMP pending.     3. Iron deficiency anemia, unspecified iron deficiency anemia type  Iron deficiency anemia, secondary to GI loss, likely and chronic medical conditions may be contributing. hgb level of 8.2 on 11/10/2018. Patient relatively asymptomatic and able to function well. Feels markedly better since hospitalization.     Repeat CBC to assess hgb. Repeat iron studies including ferritin levels. Continue iron supplementation.     4. Chronic obstructive pulmonary disease, unspecified COPD type (HCC)  Patient using 2 liters oxygen on most nights and PRN during the days. Feels that he is doing really well and able to ambulate more comfortably. Still smoking occasionally. Follows with pulmonology.     Patient was strongly urged to get pneumonia and flu vaccines. However, he and wife refuse to get the vaccines.   Counseling regarding smoking cessation provided to both patient and partner (who also smokes).       Followup: Return in about 6 months (around 8/6/2019).      Signed by: Nam Le M.D.

## 2019-02-06 NOTE — PROGRESS NOTES
Anticoagulation Summary  As of 2019    INR goal:   2.0-3.0   TTR:   33.6 % (6.3 mo)   INR used for dosin.7! (2019)   Warfarin maintenance plan:   7.5 mg (5 mg x 1.5) every day   Weekly warfarin total:   52.5 mg   Plan last modified:   Johnathan Chen PharmD (2019)   Next INR check:   2019   Target end date:   Indefinite    Indications    Paroxysmal atrial fibrillation (HCC) [I48.0]  Atrial flutter with rapid ventricular response (HCC) (Resolved) [I48.92]             Anticoagulation Episode Summary     INR check location:       Preferred lab:       Send INR reminders to:       Comments:   Jia KIM      Anticoagulation Care Providers     Provider Role Specialty Phone number    Jett Bedoya M.D. Referring Cardiology [phone]    [facility] Anticoagulation Services Responsible  [phone]        Anticoagulation Patient Findings  Patient Findings     Negatives:   Signs/symptoms of thrombosis, Signs/symptoms of bleeding, Laboratory test error suspected, Change in health, Change in alcohol use, Change in activity, Upcoming invasive procedure, Emergency department visit, Upcoming dental procedure, Missed doses, Extra doses, Change in medications, Change in diet/appetite, Hospital admission, Bruising, Other complaints        Spoke with patients wife today regarding subtherapeutic INR of 1.7.  Patient denies any signs/symptoms of bruising or bleeding or any changes in diet and medications.  Instructed patient to call clinic with any questions or concerns.  Instructed patient to bolus with 10mg X 1, then increase weekly warfarin regimen by ~10% as detailed above.  Follow up in 1 weeks, to reduce risk of adverse events related to this high risk medication,  Warfarin.    Johnathan Chen, ZakD

## 2019-02-08 ENCOUNTER — HOSPITAL ENCOUNTER (OUTPATIENT)
Dept: RADIOLOGY | Facility: MEDICAL CENTER | Age: 54
End: 2019-02-08
Attending: FAMILY MEDICINE
Payer: MEDICARE

## 2019-02-08 ENCOUNTER — HOSPITAL ENCOUNTER (OUTPATIENT)
Dept: LAB | Facility: MEDICAL CENTER | Age: 54
End: 2019-02-08
Attending: FAMILY MEDICINE
Payer: MEDICARE

## 2019-02-08 ENCOUNTER — HOSPITAL ENCOUNTER (OUTPATIENT)
Dept: LAB | Facility: MEDICAL CENTER | Age: 54
End: 2019-02-08
Attending: NURSE PRACTITIONER
Payer: MEDICARE

## 2019-02-08 DIAGNOSIS — I25.5 ISCHEMIC CARDIOMYOPATHY: ICD-10-CM

## 2019-02-08 DIAGNOSIS — D50.9 IRON DEFICIENCY ANEMIA, UNSPECIFIED IRON DEFICIENCY ANEMIA TYPE: ICD-10-CM

## 2019-02-08 DIAGNOSIS — E78.5 DYSLIPIDEMIA: ICD-10-CM

## 2019-02-08 DIAGNOSIS — I50.20 ACC/AHA STAGE C SYSTOLIC HEART FAILURE (HCC): ICD-10-CM

## 2019-02-08 DIAGNOSIS — I10 HTN (HYPERTENSION), MALIGNANT: ICD-10-CM

## 2019-02-08 DIAGNOSIS — M25.511 RIGHT SHOULDER PAIN, UNSPECIFIED CHRONICITY: ICD-10-CM

## 2019-02-08 DIAGNOSIS — M79.89 SWELLING OF LIMB: ICD-10-CM

## 2019-02-08 DIAGNOSIS — I48.91 ATRIAL FIBRILLATION, CONTROLLED (HCC): ICD-10-CM

## 2019-02-08 DIAGNOSIS — I25.10 CORONARY ARTERY DISEASE INVOLVING NATIVE CORONARY ARTERY OF NATIVE HEART WITHOUT ANGINA PECTORIS: ICD-10-CM

## 2019-02-08 DIAGNOSIS — I50.9 HEART FAILURE, NYHA CLASS 2 (HCC): ICD-10-CM

## 2019-02-08 LAB
ANION GAP SERPL CALC-SCNC: 9 MMOL/L (ref 0–11.9)
ANISOCYTOSIS BLD QL SMEAR: ABNORMAL
BASOPHILS # BLD AUTO: 0.9 % (ref 0–1.8)
BASOPHILS # BLD: 0.17 K/UL (ref 0–0.12)
BUN SERPL-MCNC: 20 MG/DL (ref 8–22)
CALCIUM SERPL-MCNC: 9.1 MG/DL (ref 8.5–10.5)
CHLORIDE SERPL-SCNC: 102 MMOL/L (ref 96–112)
CO2 SERPL-SCNC: 25 MMOL/L (ref 20–33)
CREAT SERPL-MCNC: 1.13 MG/DL (ref 0.5–1.4)
EOSINOPHIL # BLD AUTO: 0.65 K/UL (ref 0–0.51)
EOSINOPHIL NFR BLD: 3.5 % (ref 0–6.9)
ERYTHROCYTE [DISTWIDTH] IN BLOOD BY AUTOMATED COUNT: 55.3 FL (ref 35.9–50)
FERRITIN SERPL-MCNC: 26.5 NG/ML (ref 22–322)
GLUCOSE SERPL-MCNC: 99 MG/DL (ref 65–99)
HCT VFR BLD AUTO: 47.6 % (ref 42–52)
HGB BLD-MCNC: 12.9 G/DL (ref 14–18)
IRON SATN MFR SERPL: 23 % (ref 15–55)
IRON SERPL-MCNC: 79 UG/DL (ref 50–180)
LYMPHOCYTES # BLD AUTO: 0.33 K/UL (ref 1–4.8)
LYMPHOCYTES NFR BLD: 1.8 % (ref 22–41)
MANUAL DIFF BLD: NORMAL
MCH RBC QN AUTO: 19.4 PG (ref 27–33)
MCHC RBC AUTO-ENTMCNC: 27.1 G/DL (ref 33.7–35.3)
MCV RBC AUTO: 71.7 FL (ref 81.4–97.8)
MICROCYTES BLD QL SMEAR: ABNORMAL
MONOCYTES # BLD AUTO: 1.13 K/UL (ref 0–0.85)
MONOCYTES NFR BLD AUTO: 6.1 % (ref 0–13.4)
MORPHOLOGY BLD-IMP: NORMAL
NEUTROPHILS # BLD AUTO: 16.22 K/UL (ref 1.82–7.42)
NEUTROPHILS NFR BLD: 87.7 % (ref 44–72)
NRBC # BLD AUTO: 0 K/UL
NRBC BLD-RTO: 0 /100 WBC
OVALOCYTES BLD QL SMEAR: NORMAL
PLATELET # BLD AUTO: 326 K/UL (ref 164–446)
PLATELET BLD QL SMEAR: NORMAL
POIKILOCYTOSIS BLD QL SMEAR: NORMAL
POTASSIUM SERPL-SCNC: 4.3 MMOL/L (ref 3.6–5.5)
RBC # BLD AUTO: 6.64 M/UL (ref 4.7–6.1)
RBC BLD AUTO: PRESENT
SODIUM SERPL-SCNC: 136 MMOL/L (ref 135–145)
TIBC SERPL-MCNC: 347 UG/DL (ref 250–450)
WBC # BLD AUTO: 18.5 K/UL (ref 4.8–10.8)

## 2019-02-08 PROCEDURE — 36415 COLL VENOUS BLD VENIPUNCTURE: CPT

## 2019-02-08 PROCEDURE — 82728 ASSAY OF FERRITIN: CPT

## 2019-02-08 PROCEDURE — 83540 ASSAY OF IRON: CPT

## 2019-02-08 PROCEDURE — 83550 IRON BINDING TEST: CPT

## 2019-02-08 PROCEDURE — 85007 BL SMEAR W/DIFF WBC COUNT: CPT

## 2019-02-08 PROCEDURE — 76882 US LMTD JT/FCL EVL NVASC XTR: CPT | Mod: RT

## 2019-02-08 PROCEDURE — 85027 COMPLETE CBC AUTOMATED: CPT

## 2019-02-08 PROCEDURE — 80048 BASIC METABOLIC PNL TOTAL CA: CPT

## 2019-02-12 ENCOUNTER — ANTICOAGULATION MONITORING (OUTPATIENT)
Dept: VASCULAR LAB | Facility: MEDICAL CENTER | Age: 54
End: 2019-02-12

## 2019-02-12 DIAGNOSIS — D72.824 BASOPHILIA: ICD-10-CM

## 2019-02-12 DIAGNOSIS — I48.0 PAROXYSMAL ATRIAL FIBRILLATION (HCC): ICD-10-CM

## 2019-02-12 DIAGNOSIS — D72.829 LEUKOCYTOSIS, UNSPECIFIED TYPE: ICD-10-CM

## 2019-02-12 LAB — INR PPP: 2 (ref 2–3.5)

## 2019-02-12 NOTE — PROGRESS NOTES
Anticoagulation Summary  As of 2019    INR goal:   2.0-3.0   TTR:   32.5 % (6.6 mo)   INR used for dosin.0 (2019)   Warfarin maintenance plan:   10 mg (5 mg x 2) every Tue; 7.5 mg (5 mg x 1.5) all other days   Weekly warfarin total:   55 mg   Plan last modified:   Briana Roberson (2019)   Next INR check:   2019   Target end date:   Indefinite    Indications    Paroxysmal atrial fibrillation (HCC) [I48.0]  Atrial flutter with rapid ventricular response (HCC) (Resolved) [I48.92]             Anticoagulation Episode Summary     INR check location:       Preferred lab:       Send INR reminders to:       Comments:   Jia KIM      Anticoagulation Care Providers     Provider Role Specialty Phone number    Jett Bedoya M.D. Referring Cardiology [phone]    [facility] Anticoagulation Services Responsible  [phone]        Anticoagulation Patient Findings    Spoke with Joshua to report a therapeutic INR of 2.0. Will increase weekly dose by 5%. Follow up in 2 weeks, to reduce the risk of adverse events related to this high risk medication, warfarin.    Briana Roberson, Clinical Pharmacist

## 2019-02-19 ENCOUNTER — ANTICOAGULATION MONITORING (OUTPATIENT)
Dept: MEDICAL GROUP | Facility: MEDICAL CENTER | Age: 54
End: 2019-02-19

## 2019-02-19 DIAGNOSIS — I48.0 PAROXYSMAL ATRIAL FIBRILLATION (HCC): ICD-10-CM

## 2019-02-19 LAB — INR PPP: 2.8 (ref 2–3.5)

## 2019-02-19 NOTE — PROGRESS NOTES
Anticoagulation Summary  As of 2019    INR goal:   2.0-3.0   TTR:   34.8 % (6.8 mo)   INR used for dosin.8 (2019)   Warfarin maintenance plan:   10 mg (5 mg x 2) every Tue; 7.5 mg (5 mg x 1.5) all other days   Weekly warfarin total:   55 mg   No change documented:   Merlin Medel Ass't   Plan last modified:   Briana Roberson (2019)   Next INR check:   2019   Target end date:   Indefinite    Indications    Paroxysmal atrial fibrillation (HCC) [I48.0]  Atrial flutter with rapid ventricular response (HCC) (Resolved) [I48.92]             Anticoagulation Episode Summary     INR check location:       Preferred lab:       Send INR reminders to:       Comments:   Jia KIM      Anticoagulation Care Providers     Provider Role Specialty Phone number    Jett Bedoya M.D. Referring Cardiology [phone]    [facility] Anticoagulation Services Responsible  [phone]        Anticoagulation Patient Findings  Patient Findings     Negatives:   Signs/symptoms of thrombosis, Signs/symptoms of bleeding, Laboratory test error suspected, Change in health, Change in alcohol use, Change in activity, Upcoming invasive procedure, Emergency department visit, Upcoming dental procedure, Missed doses, Extra doses, Change in medications, Change in diet/appetite, Hospital admission, Bruising, Other complaints      Spoke with patient wife to report a therapeutic INR.  Pt instructed to continue with current warfarin dosing regimen. Pt denies any s/s of bleeding, bruising, clotting or any changes to diet or medication.  Will follow up in 1 weeks.  Merlin Medel Ass't    I have reviewed and concur with the above plan on 2019.  Briana Roberson, Clinical Pharmacist

## 2019-02-26 ENCOUNTER — ANTICOAGULATION MONITORING (OUTPATIENT)
Dept: VASCULAR LAB | Facility: MEDICAL CENTER | Age: 54
End: 2019-02-26

## 2019-02-26 DIAGNOSIS — I48.0 PAROXYSMAL ATRIAL FIBRILLATION (HCC): ICD-10-CM

## 2019-02-26 LAB — INR PPP: 3.8 (ref 2–3.5)

## 2019-02-26 NOTE — PROGRESS NOTES
Anticoagulation Summary  As of 2/26/2019    INR goal:   2.0-3.0   TTR:   34.3 % (7 mo)   INR used for dosing:   3.8! (2/26/2019)   Warfarin maintenance plan:   7.5 mg (5 mg x 1.5) every day   Weekly warfarin total:   52.5 mg   Plan last modified:   Rachele San (2/26/2019)   Next INR check:   3/5/2019   Target end date:   Indefinite    Indications    Paroxysmal atrial fibrillation (HCC) [I48.0]  Atrial flutter with rapid ventricular response (HCC) (Resolved) [I48.92]             Anticoagulation Episode Summary     INR check location:       Preferred lab:       Send INR reminders to:       Comments:   Jia KIM      Anticoagulation Care Providers     Provider Role Specialty Phone number    Jett Bedoya M.D. Referring Cardiology [phone]    [facility] Anticoagulation Services Responsible  [phone]        Anticoagulation Patient Findings  Patient Findings     Negatives:   Signs/symptoms of thrombosis, Signs/symptoms of bleeding, Laboratory test error suspected, Change in health, Change in alcohol use, Change in activity, Upcoming invasive procedure, Emergency department visit, Upcoming dental procedure, Missed doses, Extra doses, Change in medications, Change in diet/appetite, Hospital admission, Bruising, Other complaints        Spoke with the patient on the phone today, reporting a SUPRA-therapeutic INR of 3.8. Confirmed the current warfarin dosing regimen and patient compliance. Patient denies any cranberries, EtOH or illness. Patient denies any interval changes to diet and/or medications. Patient denies any signs/symptoms of bleeding or clotting.  Patient already took dose today, so will HOLD dose tomorrow and will begin decreased weekly regimen of 7.5mg QD. Patient will follow up again in 1 week.     Shira CrespoD

## 2019-02-28 ENCOUNTER — APPOINTMENT (RX ONLY)
Dept: URBAN - METROPOLITAN AREA CLINIC 4 | Facility: CLINIC | Age: 54
Setting detail: DERMATOLOGY
End: 2019-02-28

## 2019-02-28 DIAGNOSIS — L82.1 OTHER SEBORRHEIC KERATOSIS: ICD-10-CM

## 2019-02-28 DIAGNOSIS — Z85.820 PERSONAL HISTORY OF MALIGNANT MELANOMA OF SKIN: ICD-10-CM

## 2019-02-28 PROBLEM — E78.5 HYPERLIPIDEMIA, UNSPECIFIED: Status: ACTIVE | Noted: 2019-02-28

## 2019-02-28 PROBLEM — I10 ESSENTIAL (PRIMARY) HYPERTENSION: Status: ACTIVE | Noted: 2019-02-28

## 2019-02-28 PROBLEM — J45.909 UNSPECIFIED ASTHMA, UNCOMPLICATED: Status: ACTIVE | Noted: 2019-02-28

## 2019-02-28 PROBLEM — D48.5 NEOPLASM OF UNCERTAIN BEHAVIOR OF SKIN: Status: ACTIVE | Noted: 2019-02-28

## 2019-02-28 PROBLEM — J44.9 CHRONIC OBSTRUCTIVE PULMONARY DISEASE, UNSPECIFIED: Status: ACTIVE | Noted: 2019-02-28

## 2019-02-28 PROCEDURE — ? BIOPSY BY SHAVE METHOD

## 2019-02-28 PROCEDURE — 11102 TANGNTL BX SKIN SINGLE LES: CPT

## 2019-02-28 PROCEDURE — ? COUNSELING

## 2019-02-28 PROCEDURE — 99203 OFFICE O/P NEW LOW 30 MIN: CPT | Mod: 25

## 2019-02-28 ASSESSMENT — LOCATION DETAILED DESCRIPTION DERM
LOCATION DETAILED: RIGHT LATERAL NECK
LOCATION DETAILED: RIGHT MEDIAL UPPER BACK

## 2019-02-28 ASSESSMENT — LOCATION ZONE DERM
LOCATION ZONE: NECK
LOCATION ZONE: TRUNK

## 2019-02-28 ASSESSMENT — LOCATION SIMPLE DESCRIPTION DERM
LOCATION SIMPLE: POSTERIOR NECK
LOCATION SIMPLE: RIGHT UPPER BACK

## 2019-02-28 NOTE — PROCEDURE: BIOPSY BY SHAVE METHOD
Dressing: Band-Aid
Anesthesia Volume In Cc: 0
Hemostasis: Aluminum Chloride and Electrocautery
Biopsy Method: 15 blade
Silver Nitrate Text: The wound bed was treated with silver nitrate after the biopsy was performed.
Post-Care Instructions: I reviewed with the patient in detail post-care instructions. Patient is to keep the biopsy site dry overnight, and then apply bacitracin twice daily until healed. Patient may apply hydrogen peroxide soaks to remove any crusting.
Detail Level: Detailed
Curettage Text: The wound bed was treated with curettage after the biopsy was performed.
Lab: [id number]
Notification Instructions: Patient will be notified of biopsy results. However, patient instructed to call the office if not contacted within 2 weeks.
Destruction After The Procedure: No
Size Of Lesion In Cm: 3.7
Anesthesia Type: 1% lidocaine with epinephrine
Cryotherapy Text: The wound bed was treated with cryotherapy after the biopsy was performed.
Consent: Written consent was obtained and risks were reviewed including but not limited to scarring, infection, bleeding, scabbing, incomplete removal, nerve damage and allergy to anesthesia.
Lab Facility: 
Depth Of Biopsy: dermis
Biopsy Type: H and E
Electrodesiccation Text: The wound bed was treated with electrodesiccation after the biopsy was performed.
Wound Care: Aquaphor
Path Notes (To The Dermatopathologist): Sultana MM
Electrodesiccation And Curettage Text: The wound bed was treated with electrodesiccation and curettage after the biopsy was performed.
Was A Bandage Applied: Yes
Billing Type: Third-Party Bill
Type Of Destruction Used: Curettage

## 2019-03-05 ENCOUNTER — ANTICOAGULATION MONITORING (OUTPATIENT)
Dept: VASCULAR LAB | Facility: MEDICAL CENTER | Age: 54
End: 2019-03-05

## 2019-03-05 DIAGNOSIS — I48.0 PAROXYSMAL ATRIAL FIBRILLATION (HCC): ICD-10-CM

## 2019-03-05 LAB — INR PPP: 2.3 (ref 2–3.5)

## 2019-03-05 NOTE — PROGRESS NOTES
Anticoagulation Summary  As of 3/5/2019    INR goal:   2.0-3.0   TTR:   34.7 % (7.3 mo)   INR used for dosin.3 (3/5/2019)   Warfarin maintenance plan:   7.5 mg (5 mg x 1.5) every day   Weekly warfarin total:   52.5 mg   Plan last modified:   Rachele San (2019)   Next INR check:   3/12/2019   Target end date:   Indefinite    Indications    Paroxysmal atrial fibrillation (HCC) [I48.0]  Atrial flutter with rapid ventricular response (HCC) (Resolved) [I48.92]             Anticoagulation Episode Summary     INR check location:       Preferred lab:       Send INR reminders to:       Comments:   Jia KIM      Anticoagulation Care Providers     Provider Role Specialty Phone number    Jett Bedoya M.D. Referring Cardiology [phone]    [facility] Anticoagulation Services Responsible  [phone]        Anticoagulation Patient Findings  Patient Findings     Negatives:   Signs/symptoms of thrombosis, Signs/symptoms of bleeding, Laboratory test error suspected, Change in health, Change in alcohol use, Change in activity, Upcoming invasive procedure, Emergency department visit, Upcoming dental procedure, Missed doses, Extra doses, Change in medications, Change in diet/appetite, Hospital admission, Bruising, Other complaints        Spoke with patients wife today regarding therapeutic INR of 2.3.  Patient denies any signs/symptoms of bruising or bleeding or any changes in diet and medications.  Instructed patient to call clinic with any questions or concerns.  Pt is to continue with current warfarin dosing regimen.  Follow up in 1 weeks, to reduce risk of adverse events related to this high risk medication,  Warfarin.    Johnathan Chen, PharmD

## 2019-03-08 ENCOUNTER — HOSPITAL ENCOUNTER (EMERGENCY)
Facility: MEDICAL CENTER | Age: 54
End: 2019-03-08
Attending: EMERGENCY MEDICINE
Payer: MEDICARE

## 2019-03-08 VITALS
OXYGEN SATURATION: 94 % | WEIGHT: 313.49 LBS | BODY MASS INDEX: 37.02 KG/M2 | HEART RATE: 65 BPM | DIASTOLIC BLOOD PRESSURE: 80 MMHG | RESPIRATION RATE: 18 BRPM | SYSTOLIC BLOOD PRESSURE: 126 MMHG | TEMPERATURE: 97.3 F | HEIGHT: 77 IN

## 2019-03-08 DIAGNOSIS — L08.9 WOUND INFECTION: ICD-10-CM

## 2019-03-08 DIAGNOSIS — T14.8XXA WOUND INFECTION: ICD-10-CM

## 2019-03-08 PROCEDURE — A9270 NON-COVERED ITEM OR SERVICE: HCPCS | Performed by: EMERGENCY MEDICINE

## 2019-03-08 PROCEDURE — 700102 HCHG RX REV CODE 250 W/ 637 OVERRIDE(OP): Performed by: EMERGENCY MEDICINE

## 2019-03-08 PROCEDURE — 99283 EMERGENCY DEPT VISIT LOW MDM: CPT

## 2019-03-08 RX ORDER — IBUPROFEN 600 MG/1
600 TABLET ORAL ONCE
Status: COMPLETED | OUTPATIENT
Start: 2019-03-08 | End: 2019-03-08

## 2019-03-08 RX ORDER — CEPHALEXIN 500 MG/1
500 CAPSULE ORAL ONCE
Status: COMPLETED | OUTPATIENT
Start: 2019-03-08 | End: 2019-03-08

## 2019-03-08 RX ORDER — ACETAMINOPHEN 325 MG/1
650 TABLET ORAL ONCE
Status: COMPLETED | OUTPATIENT
Start: 2019-03-08 | End: 2019-03-08

## 2019-03-08 RX ORDER — CEPHALEXIN 500 MG/1
500 CAPSULE ORAL 4 TIMES DAILY
Qty: 40 CAP | Refills: 0 | Status: SHIPPED | OUTPATIENT
Start: 2019-03-08 | End: 2019-03-18

## 2019-03-08 RX ADMIN — ACETAMINOPHEN 650 MG: 325 TABLET, FILM COATED ORAL at 14:15

## 2019-03-08 RX ADMIN — CEPHALEXIN 500 MG: 500 CAPSULE ORAL at 14:15

## 2019-03-08 RX ADMIN — IBUPROFEN 600 MG: 600 TABLET ORAL at 14:15

## 2019-03-08 NOTE — ED TRIAGE NOTES
"Joshua Medrano  Chief Complaint   Patient presents with   • Shoulder Pain     (R)    • Wound Check     had \"skin cancer\" removed from (R) shoulder 3 days ago,  increasing redness and pain      Pt ambulatory to triage with above complaint.  VSS.   Pt returned to lobby, educated on triage process, and to inform staff of any changes or concerns.     "

## 2019-03-08 NOTE — ED PROVIDER NOTES
"ED Provider Note    Scribed for Karen Mathis M.D. by Josiah Arcos. 3/8/2019, 1:50 PM.    Primary care provider: Nam Le M.D.  Means of arrival: Walk In  History obtained from: Patient  History limited by: None    CHIEF COMPLAINT  Chief Complaint   Patient presents with   • Shoulder Pain     (R)    • Wound Check     had \"skin cancer\" removed from (R) shoulder 3 days ago,  increasing redness and pain        HPI  Joshua Medrano is a 53 y.o. male who presents to the Emergency Department for a wound check after having skin cancer removed 3 days ago from his right shoulder. Joshua has difficulty remembering who removed the cancer, however does know it was done by a dermatologist. He reports he has been washing it, applying the prescribed medications, and following the wound care instructions given, however he has been having increased erythema, yellowish drainage, and pain to the site. He denies any fevers at this time.    REVIEW OF SYSTEMS  Pertinent positives include Wound check, erythema, drainage. Pertinent negatives include no fevers.    PAST MEDICAL HISTORY   has a past medical history of ASTHMA; Atrial fibrillation (HCC); CAD (coronary artery disease); Chronic obstructive pulmonary disease (HCC); Congestive heart failure (HCC); and Diabetes.    SURGICAL HISTORY   has a past surgical history that includes other abdominal surgery; multiple coronary artery bypass endo vein harvest (12/22/2017); oscar (12/22/2017); thoracoscopy (Left, 1/25/2018); and colonoscopy - endo (N/A, 7/25/2018).    SOCIAL HISTORY  Social History   Substance Use Topics   • Smoking status: Light Tobacco Smoker     Packs/day: 0.00     Years: 30.00     Types: Cigars   • Smokeless tobacco: Never Used      Comment: 1/2-1.5 packs for 30 years, currently smoking cigars no longer smoking cigarettes   • Alcohol use No      History   Drug Use No       FAMILY HISTORY  Family History   Problem Relation Age of Onset   • Diabetes Mother    • Stroke " "Mother    • Leukemia Mother    • Diabetes Father    • No Known Problems Sister    • Heart Disease Brother         PPM   • Lung Disease Brother    • Alcohol/Drug Brother    • Diabetes Sister        CURRENT MEDICATIONS  Home Medications     Reviewed by Angella Aragon R.N. (Registered Nurse) on 03/08/19 at 1335  Med List Status: Partial   Medication Last Dose Status   acetaminophen (TYLENOL) 500 MG Tab  Active   albuterol 108 (90 Base) MCG/ACT Aero Soln inhalation aerosol  Active   amiodarone (CORDARONE) 200 MG Tab  Active   ferrous sulfate 325 (65 Fe) MG tablet  Active   furosemide (LASIX) 40 MG Tab  Active   ipratropium-albuterol (DUONEB) 0.5-2.5 (3) MG/3ML nebulizer solution  Active   lisinopril (PRINIVIL) 2.5 MG Tab  Active   metoprolol SR (TOPROL XL) 25 MG TABLET SR 24 HR  Active   polyethylene glycol/lytes (MIRALAX) Pack  Active   potassium chloride SA (K-DUR) 10 MEQ Tab CR  Active   rosuvastatin (CRESTOR) 40 MG tablet  Active   spironolactone (ALDACTONE) 25 MG Tab  Active   tiotropium (SPIRIVA) 18 MCG Cap  Active   warfarin (COUMADIN) 5 MG Tab  Active                ALLERGIES  Allergies   Allergen Reactions   • Cillins [Penicillins] Anaphylaxis and Rash     As a child, tolerated cefepime (12/2017), ceftriaxone (1/2018), cefazolin (12/2017)   • Erythromycin Anaphylaxis     Rxn - 1994   • Metoprolol      Pt stated got latham and aggressive, in demi dose's per wife.      • Shellfish Allergy Rash     Pt stated that he is allergic to all seafood, will develop a rash and says that rash will clear up with benadryl       PHYSICAL EXAM  VITAL SIGNS: /80   Pulse 65   Temp 36.3 °C (97.3 °F) (Temporal)   Resp 18   Ht 1.956 m (6' 5\")   Wt (!) 142.2 kg (313 lb 7.9 oz)   SpO2 94%   BMI 37.18 kg/m²   Constitutional: Alert in no apparent distress. Well apearing  HENT: Normocephalic, Atraumatic, Bilateral external ears normal. Nose normal.   Eyes:  Conjunctiva normal, non-icteric.   Lungs: Non-labored " respirations  Skin: Warm, Dry, No rash Right posterior shoulder with 3 cm round wound with some yellow drainage and surrounding erythema.   Neurologic: Alert, Grossly non-focal.   Psychiatric: Affect normal, Judgment normal, Mood normal, Appears appropriate and not intoxicated.   Extremities: moves all four extremities    Right psot shoulder 3 cm round wound with some yellow drainage and surr erythema    COURSE & MEDICAL DECISION MAKING  Pertinent Labs & Imaging studies reviewed. (See chart for details)    1:50 PM - Patient seen and examined at bedside. Patient will be treated with Keflex 500 mg, Tylenol 650 mg, Motrin 600 mg. Informed the patient that he needs to follow up with his dermatologist who performed the removal of the cancer.  There is some localized inflammation around the wound and therefore he will be given antibiotics.  He is recommended to use tylenol and ibuprofen for pain, soap and water to clean the wound and will be given a prescription for antibiotics. He will be discharged and is instructed to follow up with his dermatologist within a week, however return if the erythema continues to spread. He understands recommended plan of follow up care, and agrees to discharge.    HTN/IDDM FOLLOW UP:  The patient is referred to a primary physician for blood pressure management, diabetic screening, and for all other preventive health concerns      The patient will return for new or worsening symptoms and is stable at the time of discharge. Patient was given return precautions. Patient and/or family member verbalizes understanding and will comply.    DISPOSITION:  Patient will be discharged home in stable condition.    FOLLOW UP:  No follow-up provider specified.    OUTPATIENT MEDICATIONS:  New Prescriptions    CEPHALEXIN (KEFLEX) 500 MG CAP    Take 1 Cap by mouth 4 times a day for 10 days.       FINAL IMPRESSION  1. Wound infection               This dictation has been created using voice recognition  software and/or scribes. The accuracy of the dictation is limited by the abilities of the software and the expertise of the scribes. I expect there may be some errors of grammar and possibly content. I made every attempt to manually correct the errors within my dictation. However, errors related to voice recognition software and/or scribes may still exist and should be interpreted within the appropriate context.     IJosiah (Scribe), am scribing for, and in the presence of, Karen Mathis M.D..    Electronically signed by: Josiah Arcos (Scribe), 3/8/2019    I, Karen Mathis M.D. personally performed the services described in this documentation, as scribed by Josiah Arcos in my presence, and it is both accurate and complete. E.    The note accurately reflects work and decisions made by me.  Karen Mathis  3/8/2019  4:30 PM

## 2019-03-12 ENCOUNTER — ANTICOAGULATION MONITORING (OUTPATIENT)
Dept: VASCULAR LAB | Facility: MEDICAL CENTER | Age: 54
End: 2019-03-12

## 2019-03-12 DIAGNOSIS — I48.0 PAROXYSMAL ATRIAL FIBRILLATION (HCC): ICD-10-CM

## 2019-03-12 LAB — INR PPP: 2.4 (ref 2–3.5)

## 2019-03-12 NOTE — PROGRESS NOTES
Anticoagulation Summary  As of 3/12/2019    INR goal:   2.0-3.0   TTR:   36.7 % (7.5 mo)   INR used for dosin.4 (3/12/2019)   Warfarin maintenance plan:   7.5 mg (5 mg x 1.5) every day   Weekly warfarin total:   52.5 mg   No change documented:   Merlin Medel Ass't   Plan last modified:   Rachele San (2019)   Next INR check:   3/19/2019   Target end date:   Indefinite    Indications    Paroxysmal atrial fibrillation (HCC) [I48.0]  Atrial flutter with rapid ventricular response (HCC) (Resolved) [I48.92]             Anticoagulation Episode Summary     INR check location:       Preferred lab:       Send INR reminders to:       Comments:   Jia KIM      Anticoagulation Care Providers     Provider Role Specialty Phone number    Jett Bedoya M.D. Referring Cardiology [phone]    [facility] Anticoagulation Services Responsible  [phone]        Anticoagulation Patient Findings  Patient Findings     Negatives:   Signs/symptoms of thrombosis, Signs/symptoms of bleeding, Laboratory test error suspected, Change in health, Change in alcohol use, Change in activity, Upcoming invasive procedure, Emergency department visit, Upcoming dental procedure, Missed doses, Extra doses, Change in medications, Change in diet/appetite, Hospital admission, Bruising, Other complaints      Spoke with patient wife to report a therapeutic INR.  Pt instructed to continue with current warfarin dosing regimen. Pt denies any s/s of bleeding, bruising, clotting or any changes to diet or medication.  Will follow up in 2 weeks.  Merlin Medel Ass't    I have reviewed and am in agreement with the above stated plan on 3-12-19.  Johnathan Chen, PharmD

## 2019-03-14 ENCOUNTER — PATIENT OUTREACH (OUTPATIENT)
Dept: HEALTH INFORMATION MANAGEMENT | Facility: OTHER | Age: 54
End: 2019-03-14

## 2019-03-14 DIAGNOSIS — Z71.89 COMPLEX CARE COORDINATION: ICD-10-CM

## 2019-03-18 ENCOUNTER — PATIENT OUTREACH (OUTPATIENT)
Dept: HEALTH INFORMATION MANAGEMENT | Facility: OTHER | Age: 54
End: 2019-03-18

## 2019-03-18 NOTE — PROGRESS NOTES
"SBAR Documentation: Situation, Background, Assessment, Recommendation     Situation    Medicare ACO. Referred by \"Extremely High Risk Patients\" Report. Needs CC services for COPD, CHF, DM2, CKD, Obisety, 6 admissions in the last 12 months   Received referral due to extremely high risk patient with 6 admissions in the past year--   ER visit on 3/8/19--wound check to right shoulder after skin cancer removed   [facility] admit 11/6/18 to 11/10/18--SOB--RA sat 86   ER visit on 11/3/18--SOB, rib pain--left AMA   [facility] admit on 10/27/18 to 10/31/18--COPD exacerbation, GLF with fractured ribs   [facility] admit on 10/7/18 to 10/10/18--GLF, diarrhea-received 1 units PRBC's   ER visit on 9/9/18--SOB, abdominal swelling--nebulizer treatment   [facility] admit on 8/20/18 to 8/22/18--abdominal wall cellulitis, CHF exacerbation, constipation    Background       COPD, CHF, paroxysmal atrial fibrillation, NSTEMI, type 2 diabetes, HTN, CKD stage 3,    Assessment    Outbound call to Joshua and spoke with his wife, Lisa.  She stated she takes his calls.  [facility] RN explained the Community Care Management program and Lisa stated they don't feel they need the program right now.     Recommendation Plan:  [facility] RN will mail letter to Joshua with contact information should he choose to call and discuss enrollment in the Community Care Management program.  [facility] RN to complete referral at this time.       "

## 2019-03-18 NOTE — LETTER
March 18, 2019        Joshua Richey Wauseon  [address]  [place] [address]        Dear Joshua:    Thank you for taking the time to speak with me today about your [facility] Care Coordination services.  In the event you are in need of our services in the future, please don’t hesitate to call me. And remember, this program is at no cost to you as this is a part of [facility]’s ongoing commitment to serve the people of our community.  Benefits of working with your Care Coordination Team includes:  - Comprehensive assessment by a Registered Nurse on the telephone to identify your medical and health needs.   - Telephonic review of your medications by a Care Coordination Pharmacist to answer any questions you may have about your medications.  - Evaluation of social needs, such as, transportation; financial; food; housing; etc. by a Care Coordination  to connect you with eligible resources.  - For those eligible, we will connect you with the [facility], offering access to services to assist you to manage your Congestive Heart Failure or Chronic Obstructive Pulmonary Disease in your own home.    Please contact me at [phone] to start on the road to your Care Coordination services.       I am available 5 days a week, Monday through Friday from 8:00 a.m. - 5:00 p.m.  I look forward to speaking with you soon    If you have any questions or concerns, please don't hesitate to call.        Sincerely,        rBiana Francis R.N.    Electronically Signed

## 2019-03-21 ENCOUNTER — RX ONLY (OUTPATIENT)
Age: 54
Setting detail: RX ONLY
End: 2019-03-21

## 2019-03-21 ENCOUNTER — APPOINTMENT (RX ONLY)
Dept: URBAN - METROPOLITAN AREA CLINIC 36 | Facility: CLINIC | Age: 54
Setting detail: DERMATOLOGY
End: 2019-03-21

## 2019-03-21 PROBLEM — C43.61 MALIGNANT MELANOMA OF RIGHT UPPER LIMB, INCLUDING SHOULDER: Status: ACTIVE | Noted: 2019-03-21

## 2019-03-21 PROCEDURE — ? EXCISION

## 2019-03-21 PROCEDURE — 11606 EXC TR-EXT MAL+MARG >4 CM: CPT

## 2019-03-21 PROCEDURE — ? COUNSELING

## 2019-03-21 RX ORDER — HYDROCODONE BITARTRATE AND ACETAMINOPHEN 5; 325 MG/1; MG/1
TABLET ORAL
Qty: 20 | Refills: 0 | COMMUNITY
Start: 2019-03-21

## 2019-03-21 NOTE — PROCEDURE: EXCISION
Lip Wedge Excision Repair Text: Given the location of the defect and the proximity to free margins a full thickness wedge repair was deemed most appropriate.  Using a sterile surgical marker, the appropriate repair was drawn incorporating the defect and placing the expected incisions perpendicular to the vermilion border.  The vermilion border was also meticulously outlined to ensure appropriate reapproximation during the repair.  The area thus outlined was incised through and through with a #15 scalpel blade.  The muscularis and dermis were reaproximated with deep sutures following hemostasis. Care was taken to realign the vermilion border before proceeding with the superficial closure.  Once the vermilion was realigned the superfical and mucosal closure was finished.
Size Of Margin In Cm: 0.5
Show Curettage Variables: Yes
Complex Repair And Epidermal Autograft Text: The defect edges were debeveled with a #15 scalpel blade.  The primary defect was closed partially with a complex linear closure.  Given the location of the defect, shape of the defect and the proximity to free margins an epidermal autograft was deemed most appropriate to repair the remaining defect.  The graft was trimmed to fit the size of the remaining defect.  The graft was then placed in the primary defect, oriented appropriately, and sutured into place.
Hatchet Flap Text: The defect edges were debeveled with a #15 scalpel blade.  Given the location of the defect, shape of the defect and the proximity to free margins a hatchet flap was deemed most appropriate.  Using a sterile surgical marker, an appropriate hatchet flap was drawn incorporating the defect and placing the expected incisions within the relaxed skin tension lines where possible.    The area thus outlined was incised deep to adipose tissue with a #15 scalpel blade.  The skin margins were undermined to an appropriate distance in all directions utilizing iris scissors.
Island Pedicle Flap With Canthal Suspension Text: The defect edges were debeveled with a #15 scalpel blade.  Given the location of the defect, shape of the defect and the proximity to free margins an island pedicle advancement flap was deemed most appropriate.  Using a sterile surgical marker, an appropriate advancement flap was drawn incorporating the defect, outlining the appropriate donor tissue and placing the expected incisions within the relaxed skin tension lines where possible. The area thus outlined was incised deep to adipose tissue with a #15 scalpel blade.  The skin margins were undermined to an appropriate distance in all directions around the primary defect and laterally outward around the island pedicle utilizing iris scissors.  There was minimal undermining beneath the pedicle flap. A suspension suture was placed in the canthal tendon to prevent tension and prevent ectropion.
Paramedian Forehead Flap Text: A decision was made to reconstruct the defect utilizing an interpolation axial flap and a staged reconstruction.  A telfa template was made of the defect.  This telfa template was then used to outline the paramedian forehead pedicle flap.  The donor area for the pedicle flap was then injected with anesthesia.  The flap was excised through the skin and subcutaneous tissue down to the layer of the underlying musculature.  The pedicle flap was carefully excised within this deep plane to maintain its blood supply.  The edges of the donor site were undermined.   The donor site was closed in a primary fashion.  The pedicle was then rotated into position and sutured.  Once the tube was sutured into place, adequate blood supply was confirmed with blanching and refill.  The pedicle was then wrapped with xeroform gauze and dressed appropriately with a telfa and gauze bandage to ensure continued blood supply and protect the attached pedicle.
Saucerization Excision Additional Text (Leave Blank If You Do Not Want): The margin was drawn around the clinically apparent lesion.  Incisions were then made along these lines, in a tangential fashion, to the appropriate tissue plane and the lesion was extirpated.
Complex Repair And O-T Advancement Flap Text: The defect edges were debeveled with a #15 scalpel blade.  The primary defect was closed partially with a complex linear closure.  Given the location of the remaining defect, shape of the defect and the proximity to free margins an O-T advancement flap was deemed most appropriate for complete closure of the defect.  Using a sterile surgical marker, an appropriate advancement flap was drawn incorporating the defect and placing the expected incisions within the relaxed skin tension lines where possible.    The area thus outlined was incised deep to adipose tissue with a #15 scalpel blade.  The skin margins were undermined to an appropriate distance in all directions utilizing iris scissors.
Complex Repair And Bilobe Flap Text: The defect edges were debeveled with a #15 scalpel blade.  The primary defect was closed partially with a complex linear closure.  Given the location of the remaining defect, shape of the defect and the proximity to free margins a bilobe flap was deemed most appropriate for complete closure of the defect.  Using a sterile surgical marker, an appropriate advancement flap was drawn incorporating the defect and placing the expected incisions within the relaxed skin tension lines where possible.    The area thus outlined was incised deep to adipose tissue with a #15 scalpel blade.  The skin margins were undermined to an appropriate distance in all directions utilizing iris scissors.
Additional Anesthesia Volume In Cc: 6
Double Island Pedicle Flap Text: The defect edges were debeveled with a #15 scalpel blade.  Given the location of the defect, shape of the defect and the proximity to free margins a double island pedicle advancement flap was deemed most appropriate.  Using a sterile surgical marker, an appropriate advancement flap was drawn incorporating the defect, outlining the appropriate donor tissue and placing the expected incisions within the relaxed skin tension lines where possible.    The area thus outlined was incised deep to adipose tissue with a #15 scalpel blade.  The skin margins were undermined to an appropriate distance in all directions around the primary defect and laterally outward around the island pedicle utilizing iris scissors.  There was minimal undermining beneath the pedicle flap.
Complex Repair And Dermal Autograft Text: The defect edges were debeveled with a #15 scalpel blade.  The primary defect was closed partially with a complex linear closure.  Given the location of the defect, shape of the defect and the proximity to free margins an dermal autograft was deemed most appropriate to repair the remaining defect.  The graft was trimmed to fit the size of the remaining defect.  The graft was then placed in the primary defect, oriented appropriately, and sutured into place.
Spiral Flap Text: The defect edges were debeveled with a #15 scalpel blade.  Given the location of the defect, shape of the defect and the proximity to free margins a spiral flap was deemed most appropriate.  Using a sterile surgical marker, an appropriate rotation flap was drawn incorporating the defect and placing the expected incisions within the relaxed skin tension lines where possible. The area thus outlined was incised deep to adipose tissue with a #15 scalpel blade.  The skin margins were undermined to an appropriate distance in all directions utilizing iris scissors.
Ftsg Text: The defect edges were debeveled with a #15 scalpel blade.  Given the location of the defect, shape of the defect and the proximity to free margins a full thickness skin graft was deemed most appropriate.  Using a sterile surgical marker, the primary defect shape was transferred to the donor site. The area thus outlined was incised deep to adipose tissue with a #15 scalpel blade.  The harvested graft was then trimmed of adipose tissue until only dermis and epidermis was left.  The skin margins of the secondary defect were undermined to an appropriate distance in all directions utilizing iris scissors.  The secondary defect was closed with interrupted buried subcutaneous sutures.  The skin edges were then re-apposed with running  sutures.  The skin graft was then placed in the primary defect and oriented appropriately.
Patient Will Remove Sutures At Home?: No
Excision Method: Round
Rotation Flap Text: The defect edges were debeveled with a #15 scalpel blade.  Given the location of the defect, shape of the defect and the proximity to free margins a rotation flap was deemed most appropriate.  Using a sterile surgical marker, an appropriate rotation flap was drawn incorporating the defect and placing the expected incisions within the relaxed skin tension lines where possible.    The area thus outlined was incised deep to adipose tissue with a #15 scalpel blade.  The skin margins were undermined to an appropriate distance in all directions utilizing iris scissors.
Complex Repair And O-L Flap Text: The defect edges were debeveled with a #15 scalpel blade.  The primary defect was closed partially with a complex linear closure.  Given the location of the remaining defect, shape of the defect and the proximity to free margins an O-L flap was deemed most appropriate for complete closure of the defect.  Using a sterile surgical marker, an appropriate flap was drawn incorporating the defect and placing the expected incisions within the relaxed skin tension lines where possible.    The area thus outlined was incised deep to adipose tissue with a #15 scalpel blade.  The skin margins were undermined to an appropriate distance in all directions utilizing iris scissors.
Alar Island Pedicle Flap Text: The defect edges were debeveled with a #15 scalpel blade.  Given the location of the defect, shape of the defect and the proximity to the alar rim an island pedicle advancement flap was deemed most appropriate.  Using a sterile surgical marker, an appropriate advancement flap was drawn incorporating the defect, outlining the appropriate donor tissue and placing the expected incisions within the nasal ala running parallel to the alar rim. The area thus outlined was incised with a #15 scalpel blade.  The skin margins were undermined minimally to an appropriate distance in all directions around the primary defect and laterally outward around the island pedicle utilizing iris scissors.  There was minimal undermining beneath the pedicle flap.
Slit Excision Additional Text (Leave Blank If You Do Not Want): A linear line was drawn on the skin overlying the lesion. An incision was made slowly until the lesion was visualized.  Once visualized, the lesion was removed with blunt dissection.
Island Pedicle Flap-Requiring Vessel Identification Text: The defect edges were debeveled with a #15 scalpel blade.  Given the location of the defect, shape of the defect and the proximity to free margins an island pedicle advancement flap was deemed most appropriate.  Using a sterile surgical marker, an appropriate advancement flap was drawn, based on the axial vessel mentioned above, incorporating the defect, outlining the appropriate donor tissue and placing the expected incisions within the relaxed skin tension lines where possible.    The area thus outlined was incised deep to adipose tissue with a #15 scalpel blade.  The skin margins were undermined to an appropriate distance in all directions around the primary defect and laterally outward around the island pedicle utilizing iris scissors.  There was minimal undermining beneath the pedicle flap.
Star Wedge Flap Text: The defect edges were debeveled with a #15 scalpel blade.  Given the location of the defect, shape of the defect and the proximity to free margins a star wedge flap was deemed most appropriate.  Using a sterile surgical marker, an appropriate rotation flap was drawn incorporating the defect and placing the expected incisions within the relaxed skin tension lines where possible. The area thus outlined was incised deep to adipose tissue with a #15 scalpel blade.  The skin margins were undermined to an appropriate distance in all directions utilizing iris scissors.
Perilesional Excision Additional Text (Leave Blank If You Do Not Want): The margin was drawn around the clinically apparent lesion. Incisions were then made along these lines to the appropriate tissue plane and the lesion was extirpated.
Split-Thickness Skin Graft Text: The defect edges were debeveled with a #15 scalpel blade.  Given the location of the defect, shape of the defect and the proximity to free margins a split thickness skin graft was deemed most appropriate.  Using a sterile surgical marker, the primary defect shape was transferred to the donor site. The split thickness graft was then harvested.  The skin graft was then placed in the primary defect and oriented appropriately.
Complex Repair And Melolabial Flap Text: The defect edges were debeveled with a #15 scalpel blade.  The primary defect was closed partially with a complex linear closure.  Given the location of the remaining defect, shape of the defect and the proximity to free margins a melolabial flap was deemed most appropriate for complete closure of the defect.  Using a sterile surgical marker, an appropriate advancement flap was drawn incorporating the defect and placing the expected incisions within the relaxed skin tension lines where possible.    The area thus outlined was incised deep to adipose tissue with a #15 scalpel blade.  The skin margins were undermined to an appropriate distance in all directions utilizing iris scissors.
Billing Type: Third-Party Bill
Excisional Biopsy Additional Text (Leave Blank If You Do Not Want): The margin was drawn around the clinically apparent lesion. An elliptical shape was then drawn on the skin incorporating the lesion and margins.  Incisions were then made along these lines to the appropriate tissue plane and the lesion was extirpated.
Complex Repair And Tissue Cultured Epidermal Autograft Text: The defect edges were debeveled with a #15 scalpel blade.  The primary defect was closed partially with a complex linear closure.  Given the location of the defect, shape of the defect and the proximity to free margins an tissue cultured epidermal autograft was deemed most appropriate to repair the remaining defect.  The graft was trimmed to fit the size of the remaining defect.  The graft was then placed in the primary defect, oriented appropriately, and sutured into place.
Skin Substitute Units (Will Override Primary Defect Units If Greater Than 0): 0
Positioning (Leave Blank If You Do Not Want): The patient was placed in a comfortable position exposing the surgical site.
Repair Type: None - Delayed Repair
Transposition Flap Text: The defect edges were debeveled with a #15 scalpel blade.  Given the location of the defect and the proximity to free margins a transposition flap was deemed most appropriate.  Using a sterile surgical marker, an appropriate transposition flap was drawn incorporating the defect.    The area thus outlined was incised deep to adipose tissue with a #15 scalpel blade.  The skin margins were undermined to an appropriate distance in all directions utilizing iris scissors.
Keystone Flap Text: The defect edges were debeveled with a #15 scalpel blade.  Given the location of the defect, shape of the defect a keystone flap was deemed most appropriate.  Using a sterile surgical marker, an appropriate keystone flap was drawn incorporating the defect, outlining the appropriate donor tissue and placing the expected incisions within the relaxed skin tension lines where possible. The area thus outlined was incised deep to adipose tissue with a #15 scalpel blade.  The skin margins were undermined to an appropriate distance in all directions around the primary defect and laterally outward around the flap utilizing iris scissors.
Cartilage Graft Text: The defect edges were debeveled with a #15 scalpel blade.  Given the location of the defect, shape of the defect, the fact the defect involved a full thickness cartilage defect a cartilage graft was deemed most appropriate.  An appropriate donor site was identified, cleansed, and anesthetized. The cartilage graft was then harvested and transferred to the recipient site, oriented appropriately and then sutured into place.  The secondary defect was then repaired using a primary closure.
Complex Repair And Rotation Flap Text: The defect edges were debeveled with a #15 scalpel blade.  The primary defect was closed partially with a complex linear closure.  Given the location of the remaining defect, shape of the defect and the proximity to free margins a rotation flap was deemed most appropriate for complete closure of the defect.  Using a sterile surgical marker, an appropriate advancement flap was drawn incorporating the defect and placing the expected incisions within the relaxed skin tension lines where possible.    The area thus outlined was incised deep to adipose tissue with a #15 scalpel blade.  The skin margins were undermined to an appropriate distance in all directions utilizing iris scissors.
Anesthesia Volume In Cc: 20
Complex Repair And Xenograft Text: The defect edges were debeveled with a #15 scalpel blade.  The primary defect was closed partially with a complex linear closure.  Given the location of the defect, shape of the defect and the proximity to free margins a xenograft was deemed most appropriate to repair the remaining defect.  The graft was trimmed to fit the size of the remaining defect.  The graft was then placed in the primary defect, oriented appropriately, and sutured into place.
Repair Performed By Another Provider Text (Leave Blank If You Do Not Want): After the tissue was excised the defect was repaired by another provider.
Hemostasis: Electrocautery
Complex Repair And Rhombic Flap Text: The defect edges were debeveled with a #15 scalpel blade.  The primary defect was closed partially with a complex linear closure.  Given the location of the remaining defect, shape of the defect and the proximity to free margins a rhombic flap was deemed most appropriate for complete closure of the defect.  Using a sterile surgical marker, an appropriate advancement flap was drawn incorporating the defect and placing the expected incisions within the relaxed skin tension lines where possible.    The area thus outlined was incised deep to adipose tissue with a #15 scalpel blade.  The skin margins were undermined to an appropriate distance in all directions utilizing iris scissors.
O-T Plasty Text: The defect edges were debeveled with a #15 scalpel blade.  Given the location of the defect, shape of the defect and the proximity to free margins an O-T plasty was deemed most appropriate.  Using a sterile surgical marker, an appropriate O-T plasty was drawn incorporating the defect and placing the expected incisions within the relaxed skin tension lines where possible.    The area thus outlined was incised deep to adipose tissue with a #15 scalpel blade.  The skin margins were undermined to an appropriate distance in all directions utilizing iris scissors.
Complex Repair And Skin Substitute Graft Text: The defect edges were debeveled with a #15 scalpel blade.  The primary defect was closed partially with a complex linear closure.  Given the location of the remaining defect, shape of the defect and the proximity to free margins a skin substitute graft was deemed most appropriate to repair the remaining defect.  The graft was trimmed to fit the size of the remaining defect.  The graft was then placed in the primary defect, oriented appropriately, and sutured into place.
Pre-Excision Curettage Text (Leave Blank If You Do Not Want): Prior to drawing the surgical margin the visible lesion was removed with electrodesiccation and curettage to clearly define the lesion size.
Muscle Hinge Flap Text: The defect edges were debeveled with a #15 scalpel blade.  Given the size, depth and location of the defect and the proximity to free margins a muscle hinge flap was deemed most appropriate.  Using a sterile surgical marker, an appropriate hinge flap was drawn incorporating the defect. The area thus outlined was incised with a #15 scalpel blade.  The skin margins were undermined to an appropriate distance in all directions utilizing iris scissors.
Composite Graft Text: The defect edges were debeveled with a #15 scalpel blade.  Given the location of the defect, shape of the defect, the proximity to free margins and the fact the defect was full thickness a composite graft was deemed most appropriate.  The defect was outline and then transferred to the donor site.  A full thickness graft was then excised from the donor site. The graft was then placed in the primary defect, oriented appropriately and then sutured into place.  The secondary defect was then repaired using a primary closure.
Epidermal Sutures: 4-0 PDO
Complex Repair And V-Y Plasty Text: The defect edges were debeveled with a #15 scalpel blade.  The primary defect was closed partially with a complex linear closure.  Given the location of the remaining defect, shape of the defect and the proximity to free margins a V-Y plasty was deemed most appropriate for complete closure of the defect.  Using a sterile surgical marker, an appropriate advancement flap was drawn incorporating the defect and placing the expected incisions within the relaxed skin tension lines where possible.    The area thus outlined was incised deep to adipose tissue with a #15 scalpel blade.  The skin margins were undermined to an appropriate distance in all directions utilizing iris scissors.
Rhombic Flap Text: The defect edges were debeveled with a #15 scalpel blade.  Given the location of the defect and the proximity to free margins a rhombic flap was deemed most appropriate.  Using a sterile surgical marker, an appropriate rhombic flap was drawn incorporating the defect.    The area thus outlined was incised deep to adipose tissue with a #15 scalpel blade.  The skin margins were undermined to an appropriate distance in all directions utilizing iris scissors.
O-Z Plasty Text: The defect edges were debeveled with a #15 scalpel blade.  Given the location of the defect, shape of the defect and the proximity to free margins an O-Z plasty (double transposition flap) was deemed most appropriate.  Using a sterile surgical marker, the appropriate transposition flaps were drawn incorporating the defect and placing the expected incisions within the relaxed skin tension lines where possible.    The area thus outlined was incised deep to adipose tissue with a #15 scalpel blade.  The skin margins were undermined to an appropriate distance in all directions utilizing iris scissors.  Hemostasis was achieved with electrocautery.  The flaps were then transposed into place, one clockwise and the other counterclockwise, and anchored with interrupted buried subcutaneous sutures.
Melolabial Transposition Flap Text: The defect edges were debeveled with a #15 scalpel blade.  Given the location of the defect and the proximity to free margins a melolabial flap was deemed most appropriate.  Using a sterile surgical marker, an appropriate melolabial transposition flap was drawn incorporating the defect.    The area thus outlined was incised deep to adipose tissue with a #15 scalpel blade.  The skin margins were undermined to an appropriate distance in all directions utilizing iris scissors.
Epidermal Autograft Text: The defect edges were debeveled with a #15 scalpel blade.  Given the location of the defect, shape of the defect and the proximity to free margins an epidermal autograft was deemed most appropriate.  Using a sterile surgical marker, the primary defect shape was transferred to the donor site. The epidermal graft was then harvested.  The skin graft was then placed in the primary defect and oriented appropriately.
Path Notes (To The Dermatopathologist): Please check margins.
No Repair - Repaired With Adjacent Surgical Defect Text (Leave Blank If You Do Not Want): After the excision the defect was repaired concurrently with another surgical defect which was in close approximation.
Complex Repair And Transposition Flap Text: The defect edges were debeveled with a #15 scalpel blade.  The primary defect was closed partially with a complex linear closure.  Given the location of the remaining defect, shape of the defect and the proximity to free margins a transposition flap was deemed most appropriate for complete closure of the defect.  Using a sterile surgical marker, an appropriate advancement flap was drawn incorporating the defect and placing the expected incisions within the relaxed skin tension lines where possible.    The area thus outlined was incised deep to adipose tissue with a #15 scalpel blade.  The skin margins were undermined to an appropriate distance in all directions utilizing iris scissors.
Complex Repair Preamble Text (Leave Blank If You Do Not Want): Extensive wide undermining was performed.
Complex Repair And M Plasty Text: The defect edges were debeveled with a #15 scalpel blade.  The primary defect was closed partially with a complex linear closure.  Given the location of the remaining defect, shape of the defect and the proximity to free margins an M plasty was deemed most appropriate for complete closure of the defect.  Using a sterile surgical marker, an appropriate advancement flap was drawn incorporating the defect and placing the expected incisions within the relaxed skin tension lines where possible.    The area thus outlined was incised deep to adipose tissue with a #15 scalpel blade.  The skin margins were undermined to an appropriate distance in all directions utilizing iris scissors.
S Plasty Text: Given the location and shape of the defect, and the orientation of relaxed skin tension lines, an S-plasty was deemed most appropriate for repair.  Using a sterile surgical marker, the appropriate outline of the S-plasty was drawn, incorporating the defect and placing the expected incisions within the relaxed skin tension lines where possible.  The area thus outlined was incised deep to adipose tissue with a #15 scalpel blade.  The skin margins were undermined to an appropriate distance in all directions utilizing iris scissors. The skin flaps were advanced over the defect.  The opposing margins were then approximated with interrupted buried subcutaneous sutures.
Rhomboid Transposition Flap Text: The defect edges were debeveled with a #15 scalpel blade.  Given the location of the defect and the proximity to free margins a rhomboid transposition flap was deemed most appropriate.  Using a sterile surgical marker, an appropriate rhomboid flap was drawn incorporating the defect.    The area thus outlined was incised deep to adipose tissue with a #15 scalpel blade.  The skin margins were undermined to an appropriate distance in all directions utilizing iris scissors.
Intermediate Repair Preamble Text (Leave Blank If You Do Not Want): Undermining was performed with blunt dissection.
Lab: [id number]
Estimated Blood Loss (Cc): minimal
Double O-Z Plasty Text: The defect edges were debeveled with a #15 scalpel blade.  Given the location of the defect, shape of the defect and the proximity to free margins a Double O-Z plasty (double transposition flap) was deemed most appropriate.  Using a sterile surgical marker, the appropriate transposition flaps were drawn incorporating the defect and placing the expected incisions within the relaxed skin tension lines where possible. The area thus outlined was incised deep to adipose tissue with a #15 scalpel blade.  The skin margins were undermined to an appropriate distance in all directions utilizing iris scissors.  Hemostasis was achieved with electrocautery.  The flaps were then transposed into place, one clockwise and the other counterclockwise, and anchored with interrupted buried subcutaneous sutures.
Advancement Flap (Single) Text: The defect edges were debeveled with a #15 scalpel blade.  Given the location of the defect and the proximity to free margins a single advancement flap was deemed most appropriate.  Using a sterile surgical marker, an appropriate advancement flap was drawn incorporating the defect and placing the expected incisions within the relaxed skin tension lines where possible.    The area thus outlined was incised deep to adipose tissue with a #15 scalpel blade.  The skin margins were undermined to an appropriate distance in all directions utilizing iris scissors.
Dermal Autograft Text: The defect edges were debeveled with a #15 scalpel blade.  Given the location of the defect, shape of the defect and the proximity to free margins a dermal autograft was deemed most appropriate.  Using a sterile surgical marker, the primary defect shape was transferred to the donor site. The area thus outlined was incised deep to adipose tissue with a #15 scalpel blade.  The harvested graft was then trimmed of adipose and epidermal tissue until only dermis was left.  The skin graft was then placed in the primary defect and oriented appropriately.
V-Y Plasty Text: The defect edges were debeveled with a #15 scalpel blade.  Given the location of the defect, shape of the defect and the proximity to free margins an V-Y advancement flap was deemed most appropriate.  Using a sterile surgical marker, an appropriate advancement flap was drawn incorporating the defect and placing the expected incisions within the relaxed skin tension lines where possible.    The area thus outlined was incised deep to adipose tissue with a #15 scalpel blade.  The skin margins were undermined to an appropriate distance in all directions utilizing iris scissors.
Bi-Rhombic Flap Text: The defect edges were debeveled with a #15 scalpel blade.  Given the location of the defect and the proximity to free margins a bi-rhombic flap was deemed most appropriate.  Using a sterile surgical marker, an appropriate rhombic flap was drawn incorporating the defect. The area thus outlined was incised deep to adipose tissue with a #15 scalpel blade.  The skin margins were undermined to an appropriate distance in all directions utilizing iris scissors.
Tissue Cultured Epidermal Autograft Text: The defect edges were debeveled with a #15 scalpel blade.  Given the location of the defect, shape of the defect and the proximity to free margins a tissue cultured epidermal autograft was deemed most appropriate.  The graft was then trimmed to fit the size of the defect.  The graft was then placed in the primary defect and oriented appropriately.
Graft Donor Site Bandage (Optional-Leave Blank If You Don't Want In Note): Steri-strips and a pressure bandage were applied to the donor site.
Burow's Advancement Flap Text: The defect edges were debeveled with a #15 scalpel blade.  Given the location of the defect and the proximity to free margins a Burow's advancement flap was deemed most appropriate.  Using a sterile surgical marker, the appropriate advancement flap was drawn incorporating the defect and placing the expected incisions within the relaxed skin tension lines where possible.    The area thus outlined was incised deep to adipose tissue with a #15 scalpel blade.  The skin margins were undermined to an appropriate distance in all directions utilizing iris scissors.
Advancement Flap (Double) Text: The defect edges were debeveled with a #15 scalpel blade.  Given the location of the defect and the proximity to free margins a double advancement flap was deemed most appropriate.  Using a sterile surgical marker, the appropriate advancement flaps were drawn incorporating the defect and placing the expected incisions within the relaxed skin tension lines where possible.    The area thus outlined was incised deep to adipose tissue with a #15 scalpel blade.  The skin margins were undermined to an appropriate distance in all directions utilizing iris scissors.
Consent was obtained from the patient. The risks and benefits to therapy were discussed in detail. Specifically, the risks of infection, scarring, bleeding, prolonged wound healing, incomplete removal, allergy to anesthesia, nerve injury and recurrence were addressed. Prior to the procedure, the treatment site was clearly identified and confirmed by the patient. All components of Universal Protocol/PAUSE Rule completed.
Lab Facility: 
Skin Substitute Text: The defect edges were debeveled with a #15 scalpel blade.  Given the location of the defect, shape of the defect and the proximity to free margins a skin substitute graft was deemed most appropriate.  The graft material was trimmed to fit the size of the defect. The graft was then placed in the primary defect and oriented appropriately.
Crescentic Advancement Flap Text: The defect edges were debeveled with a #15 scalpel blade.  Given the location of the defect and the proximity to free margins a crescentic advancement flap was deemed most appropriate.  Using a sterile surgical marker, the appropriate advancement flap was drawn incorporating the defect and placing the expected incisions within the relaxed skin tension lines where possible.    The area thus outlined was incised deep to adipose tissue with a #15 scalpel blade.  The skin margins were undermined to an appropriate distance in all directions utilizing iris scissors.
H Plasty Text: Given the location of the defect, shape of the defect and the proximity to free margins a H-plasty was deemed most appropriate for repair.  Using a sterile surgical marker, the appropriate advancement arms of the H-plasty were drawn incorporating the defect and placing the expected incisions within the relaxed skin tension lines where possible. The area thus outlined was incised deep to adipose tissue with a #15 scalpel blade. The skin margins were undermined to an appropriate distance in all directions utilizing iris scissors.  The opposing advancement arms were then advanced into place in opposite direction and anchored with interrupted buried subcutaneous sutures.
Xenograft Text: The defect edges were debeveled with a #15 scalpel blade.  Given the location of the defect, shape of the defect and the proximity to free margins a xenograft was deemed most appropriate.  The graft was then trimmed to fit the size of the defect.  The graft was then placed in the primary defect and oriented appropriately.
Helical Rim Advancement Flap Text: The defect edges were debeveled with a #15 blade scalpel.  Given the location of the defect and the proximity to free margins (helical rim) a double helical rim advancement flap was deemed most appropriate.  Using a sterile surgical marker, the appropriate advancement flaps were drawn incorporating the defect and placing the expected incisions between the helical rim and antihelix where possible.  The area thus outlined was incised through and through with a #15 scalpel blade.  With a skin hook and iris scissors, the flaps were gently and sharply undermined and freed up.
Excision Depth: adipose tissue
Post-Care Instructions: I reviewed with the patient in detail post-care instructions. Patient is not to engage in any heavy lifting, exercise, or swimming for the next 14 days. Should the patient develop any fevers, chills, bleeding, severe pain patient will contact the office immediately.
A-T Advancement Flap Text: The defect edges were debeveled with a #15 scalpel blade.  Given the location of the defect, shape of the defect and the proximity to free margins an A-T advancement flap was deemed most appropriate.  Using a sterile surgical marker, an appropriate advancement flap was drawn incorporating the defect and placing the expected incisions within the relaxed skin tension lines where possible.    The area thus outlined was incised deep to adipose tissue with a #15 scalpel blade.  The skin margins were undermined to an appropriate distance in all directions utilizing iris scissors.
Purse String (Intermediate) Text: Given the location of the defect and the characteristics of the surrounding skin a purse string intermediate closure was deemed most appropriate.  Undermining was performed circumfirentially around the surgical defect.  A purse string suture was then placed and tightened.
Scalpel Size: 15 blade
Bilateral Helical Rim Advancement Flap Text: The defect edges were debeveled with a #15 blade scalpel.  Given the location of the defect and the proximity to free margins (helical rim) a bilateral helical rim advancement flap was deemed most appropriate.  Using a sterile surgical marker, the appropriate advancement flaps were drawn incorporating the defect and placing the expected incisions between the helical rim and antihelix where possible.  The area thus outlined was incised through and through with a #15 scalpel blade.  With a skin hook and iris scissors, the flaps were gently and sharply undermined and freed up.
Detail Level: Detailed
W Plasty Text: The lesion was extirpated to the level of the fat with a #15 scalpel blade.  Given the location of the defect, shape of the defect and the proximity to free margins a W-plasty was deemed most appropriate for repair.  Using a sterile surgical marker, the appropriate transposition arms of the W-plasty were drawn incorporating the defect and placing the expected incisions within the relaxed skin tension lines where possible.    The area thus outlined was incised deep to adipose tissue with a #15 scalpel blade.  The skin margins were undermined to an appropriate distance in all directions utilizing iris scissors.  The opposing transposition arms were then transposed into place in opposite direction and anchored with interrupted buried subcutaneous sutures.
Home Suture Removal Text: Patient was provided a home suture removal kit and will remove their sutures at home.  If they have any questions or difficulties they will call the office.
Banner Transposition Flap Text: The defect edges were debeveled with a #15 scalpel blade.  Given the location of the defect and the proximity to free margins a Banner transposition flap was deemed most appropriate.  Using a sterile surgical marker, an appropriate flap drawn around the defect. The area thus outlined was incised deep to adipose tissue with a #15 scalpel blade.  The skin margins were undermined to an appropriate distance in all directions utilizing iris scissors.
Epidermal Closure: running subcuticular
Z Plasty Text: The lesion was extirpated to the level of the fat with a #15 scalpel blade.  Given the location of the defect, shape of the defect and the proximity to free margins a Z-plasty was deemed most appropriate for repair.  Using a sterile surgical marker, the appropriate transposition arms of the Z-plasty were drawn incorporating the defect and placing the expected incisions within the relaxed skin tension lines where possible.    The area thus outlined was incised deep to adipose tissue with a #15 scalpel blade.  The skin margins were undermined to an appropriate distance in all directions utilizing iris scissors.  The opposing transposition arms were then transposed into place in opposite direction and anchored with interrupted buried subcutaneous sutures.
Purse String (Simple) Text: Given the location of the defect and the characteristics of the surrounding skin a purse string simple closure was deemed most appropriate.  Undermining was performed circumferentially around the surgical defect.  A purse string suture was then placed and tightened.
O-T Advancement Flap Text: The defect edges were debeveled with a #15 scalpel blade.  Given the location of the defect, shape of the defect and the proximity to free margins an O-T advancement flap was deemed most appropriate.  Using a sterile surgical marker, an appropriate advancement flap was drawn incorporating the defect and placing the expected incisions within the relaxed skin tension lines where possible.    The area thus outlined was incised deep to adipose tissue with a #15 scalpel blade.  The skin margins were undermined to an appropriate distance in all directions utilizing iris scissors.
Ear Star Wedge Flap Text: The defect edges were debeveled with a #15 blade scalpel.  Given the location of the defect and the proximity to free margins (helical rim) an ear star wedge flap was deemed most appropriate.  Using a sterile surgical marker, the appropriate flap was drawn incorporating the defect and placing the expected incisions between the helical rim and antihelix where possible.  The area thus outlined was incised through and through with a #15 scalpel blade.
Complex Repair And Double M Plasty Text: The defect edges were debeveled with a #15 scalpel blade.  The primary defect was closed partially with a complex linear closure.  Given the location of the remaining defect, shape of the defect and the proximity to free margins a double M plasty was deemed most appropriate for complete closure of the defect.  Using a sterile surgical marker, an appropriate advancement flap was drawn incorporating the defect and placing the expected incisions within the relaxed skin tension lines where possible.    The area thus outlined was incised deep to adipose tissue with a #15 scalpel blade.  The skin margins were undermined to an appropriate distance in all directions utilizing iris scissors.
Cheek-To-Nose Interpolation Flap Text: A decision was made to reconstruct the defect utilizing an interpolation axial flap and a staged reconstruction.  A telfa template was made of the defect.  This telfa template was then used to outline the Cheek-To-Nose Interpolation flap.  The donor area for the pedicle flap was then injected with anesthesia.  The flap was excised through the skin and subcutaneous tissue down to the layer of the underlying musculature.  The interpolation flap was carefully excised within this deep plane to maintain its blood supply.  The edges of the donor site were undermined.   The donor site was closed in a primary fashion.  The pedicle was then rotated into position and sutured.  Once the tube was sutured into place, adequate blood supply was confirmed with blanching and refill.  The pedicle was then wrapped with xeroform gauze and dressed appropriately with a telfa and gauze bandage to ensure continued blood supply and protect the attached pedicle.
O-L Flap Text: The defect edges were debeveled with a #15 scalpel blade.  Given the location of the defect, shape of the defect and the proximity to free margins an O-L flap was deemed most appropriate.  Using a sterile surgical marker, an appropriate advancement flap was drawn incorporating the defect and placing the expected incisions within the relaxed skin tension lines where possible.    The area thus outlined was incised deep to adipose tissue with a #15 scalpel blade.  The skin margins were undermined to an appropriate distance in all directions utilizing iris scissors.
Previous Accession (Optional): [id number]
Primary Defect Length (In Cm): 5.3
Cheek Interpolation Flap Text: A decision was made to reconstruct the defect utilizing an interpolation axial flap and a staged reconstruction.  A telfa template was made of the defect.  This telfa template was then used to outline the Cheek Interpolation flap.  The donor area for the pedicle flap was then injected with anesthesia.  The flap was excised through the skin and subcutaneous tissue down to the layer of the underlying musculature.  The interpolation flap was carefully excised within this deep plane to maintain its blood supply.  The edges of the donor site were undermined.   The donor site was closed in a primary fashion.  The pedicle was then rotated into position and sutured.  Once the tube was sutured into place, adequate blood supply was confirmed with blanching and refill.  The pedicle was then wrapped with xeroform gauze and dressed appropriately with a telfa and gauze bandage to ensure continued blood supply and protect the attached pedicle.
Partial Purse String (Intermediate) Text: Given the location of the defect and the characteristics of the surrounding skin an intermediate purse string closure was deemed most appropriate.  Undermining was performed circumferentially around the surgical defect.  A purse string suture was then placed and tightened. Wound tension of the circular defect prevented complete closure of the wound.
Complex Repair And Single Advancement Flap Text: The defect edges were debeveled with a #15 scalpel blade.  The primary defect was closed partially with a complex linear closure.  Given the location of the remaining defect, shape of the defect and the proximity to free margins a single advancement flap was deemed most appropriate for complete closure of the defect.  Using a sterile surgical marker, an appropriate advancement flap was drawn incorporating the defect and placing the expected incisions within the relaxed skin tension lines where possible.    The area thus outlined was incised deep to adipose tissue with a #15 scalpel blade.  The skin margins were undermined to an appropriate distance in all directions utilizing iris scissors.
Complex Repair And W Plasty Text: The defect edges were debeveled with a #15 scalpel blade.  The primary defect was closed partially with a complex linear closure.  Given the location of the remaining defect, shape of the defect and the proximity to free margins a W plasty was deemed most appropriate for complete closure of the defect.  Using a sterile surgical marker, an appropriate advancement flap was drawn incorporating the defect and placing the expected incisions within the relaxed skin tension lines where possible.    The area thus outlined was incised deep to adipose tissue with a #15 scalpel blade.  The skin margins were undermined to an appropriate distance in all directions utilizing iris scissors.
Advancement-Rotation Flap Text: The defect edges were debeveled with a #15 scalpel blade.  Given the location of the defect, shape of the defect and the proximity to free margins an advancement-rotation flap was deemed most appropriate.  Using a sterile surgical marker, an appropriate flap was drawn incorporating the defect and placing the expected incisions within the relaxed skin tension lines where possible. The area thus outlined was incised deep to adipose tissue with a #15 scalpel blade.  The skin margins were undermined to an appropriate distance in all directions utilizing iris scissors.
Interpolation Flap Text: A decision was made to reconstruct the defect utilizing an interpolation axial flap and a staged reconstruction.  A telfa template was made of the defect.  This telfa template was then used to outline the interpolation flap.  The donor area for the pedicle flap was then injected with anesthesia.  The flap was excised through the skin and subcutaneous tissue down to the layer of the underlying musculature.  The interpolation flap was carefully excised within this deep plane to maintain its blood supply.  The edges of the donor site were undermined.   The donor site was closed in a primary fashion.  The pedicle was then rotated into position and sutured.  Once the tube was sutured into place, adequate blood supply was confirmed with blanching and refill.  The pedicle was then wrapped with xeroform gauze and dressed appropriately with a telfa and gauze bandage to ensure continued blood supply and protect the attached pedicle.
Additional Epidermal Closure (Optional): simple interrupted
Anesthesia Volume In Cc: 7
V-Y Flap Text: The defect edges were debeveled with a #15 scalpel blade.  Given the location of the defect, shape of the defect and the proximity to free margins a V-Y flap was deemed most appropriate.  Using a sterile surgical marker, an appropriate advancement flap was drawn incorporating the defect and placing the expected incisions within the relaxed skin tension lines where possible.    The area thus outlined was incised deep to adipose tissue with a #15 scalpel blade.  The skin margins were undermined to an appropriate distance in all directions utilizing iris scissors.
Partial Purse String (Simple) Text: Given the location of the defect and the characteristics of the surrounding skin a simple purse string closure was deemed most appropriate.  Undermining was performed circumferentially around the surgical defect.  A purse string suture was then placed and tightened. Wound tension of the circular defect prevented complete closure of the wound.
Primary Defect Width (In Cm): 4.8
Bilobed Flap Text: The defect edges were debeveled with a #15 scalpel blade.  Given the location of the defect and the proximity to free margins a bilobe flap was deemed most appropriate.  Using a sterile surgical marker, an appropriate bilobe flap drawn around the defect.    The area thus outlined was incised deep to adipose tissue with a #15 scalpel blade.  The skin margins were undermined to an appropriate distance in all directions utilizing iris scissors.
Mercedes Flap Text: The defect edges were debeveled with a #15 scalpel blade.  Given the location of the defect, shape of the defect and the proximity to free margins a Mercedes flap was deemed most appropriate.  Using a sterile surgical marker, an appropriate advancement flap was drawn incorporating the defect and placing the expected incisions within the relaxed skin tension lines where possible. The area thus outlined was incised deep to adipose tissue with a #15 scalpel blade.  The skin margins were undermined to an appropriate distance in all directions utilizing iris scissors.
Trilobed Flap Text: The defect edges were debeveled with a #15 scalpel blade.  Given the location of the defect and the proximity to free margins a trilobed flap was deemed most appropriate.  Using a sterile surgical marker, an appropriate trilobed flap drawn around the defect.    The area thus outlined was incised deep to adipose tissue with a #15 scalpel blade.  The skin margins were undermined to an appropriate distance in all directions utilizing iris scissors.
Information: Selecting Yes will display possible errors in your note based on the variables you have selected. This validation is only offered as a suggestion for you. PLEASE NOTE THAT THE VALIDATION TEXT WILL BE REMOVED WHEN YOU FINALIZE YOUR NOTE. IF YOU WANT TO FAX A PRELIMINARY NOTE YOU WILL NEED TO TOGGLE THIS TO 'NO' IF YOU DO NOT WANT IT IN YOUR FAXED NOTE.
Curvilinear Excision Additional Text (Leave Blank If You Do Not Want): The margin was drawn around the clinically apparent lesion.  A curvilinear shape was then drawn on the skin incorporating the lesion and margins.  Incisions were then made along these lines to the appropriate tissue plane and the lesion was extirpated.
Melolabial Interpolation Flap Text: A decision was made to reconstruct the defect utilizing an interpolation axial flap and a staged reconstruction.  A telfa template was made of the defect.  This telfa template was then used to outline the melolabial interpolation flap.  The donor area for the pedicle flap was then injected with anesthesia.  The flap was excised through the skin and subcutaneous tissue down to the layer of the underlying musculature.  The pedicle flap was carefully excised within this deep plane to maintain its blood supply.  The edges of the donor site were undermined.   The donor site was closed in a primary fashion.  The pedicle was then rotated into position and sutured.  Once the tube was sutured into place, adequate blood supply was confirmed with blanching and refill.  The pedicle was then wrapped with xeroform gauze and dressed appropriately with a telfa and gauze bandage to ensure continued blood supply and protect the attached pedicle.
Wound Care: Petrolatum
Complex Repair And Double Advancement Flap Text: The defect edges were debeveled with a #15 scalpel blade.  The primary defect was closed partially with a complex linear closure.  Given the location of the remaining defect, shape of the defect and the proximity to free margins a double advancement flap was deemed most appropriate for complete closure of the defect.  Using a sterile surgical marker, an appropriate advancement flap was drawn incorporating the defect and placing the expected incisions within the relaxed skin tension lines where possible.    The area thus outlined was incised deep to adipose tissue with a #15 scalpel blade.  The skin margins were undermined to an appropriate distance in all directions utilizing iris scissors.
Complex Repair And Z Plasty Text: The defect edges were debeveled with a #15 scalpel blade.  The primary defect was closed partially with a complex linear closure.  Given the location of the remaining defect, shape of the defect and the proximity to free margins a Z plasty was deemed most appropriate for complete closure of the defect.  Using a sterile surgical marker, an appropriate advancement flap was drawn incorporating the defect and placing the expected incisions within the relaxed skin tension lines where possible.    The area thus outlined was incised deep to adipose tissue with a #15 scalpel blade.  The skin margins were undermined to an appropriate distance in all directions utilizing iris scissors.
Bilobed Transposition Flap Text: The defect edges were debeveled with a #15 scalpel blade.  Given the location of the defect and the proximity to free margins a bilobed transposition flap was deemed most appropriate.  Using a sterile surgical marker, an appropriate bilobe flap drawn around the defect.    The area thus outlined was incised deep to adipose tissue with a #15 scalpel blade.  The skin margins were undermined to an appropriate distance in all directions utilizing iris scissors.
Size Of Lesion In Cm: 4.3
Modified Advancement Flap Text: The defect edges were debeveled with a #15 scalpel blade.  Given the location of the defect, shape of the defect and the proximity to free margins a modified advancement flap was deemed most appropriate.  Using a sterile surgical marker, an appropriate advancement flap was drawn incorporating the defect and placing the expected incisions within the relaxed skin tension lines where possible.    The area thus outlined was incised deep to adipose tissue with a #15 scalpel blade.  The skin margins were undermined to an appropriate distance in all directions utilizing iris scissors.
Complex Repair And Modified Advancement Flap Text: The defect edges were debeveled with a #15 scalpel blade.  The primary defect was closed partially with a complex linear closure.  Given the location of the remaining defect, shape of the defect and the proximity to free margins a modified advancement flap was deemed most appropriate for complete closure of the defect.  Using a sterile surgical marker, an appropriate advancement flap was drawn incorporating the defect and placing the expected incisions within the relaxed skin tension lines where possible.    The area thus outlined was incised deep to adipose tissue with a #15 scalpel blade.  The skin margins were undermined to an appropriate distance in all directions utilizing iris scissors.
Dorsal Nasal Flap Text: The defect edges were debeveled with a #15 scalpel blade.  Given the location of the defect and the proximity to free margins a dorsal nasal flap was deemed most appropriate.  Using a sterile surgical marker, an appropriate dorsal nasal flap was drawn around the defect.    The area thus outlined was incised deep to adipose tissue with a #15 scalpel blade.  The skin margins were undermined to an appropriate distance in all directions utilizing iris scissors.
Anesthesia Type: 0.05% lidocaine with 1:100,000 epinephrine and a 1:100 solution of 8.4% sodium bicarbonate
Fusiform Excision Additional Text (Leave Blank If You Do Not Want): The margin was drawn around the clinically apparent lesion.  A fusiform shape was then drawn on the skin incorporating the lesion and margins.  Incisions were then made along these lines to the appropriate tissue plane and the lesion was extirpated.
Anesthesia Type: 1% lidocaine with epinephrine
Complex Repair And Dorsal Nasal Flap Text: The defect edges were debeveled with a #15 scalpel blade.  The primary defect was closed partially with a complex linear closure.  Given the location of the remaining defect, shape of the defect and the proximity to free margins a dorsal nasal flap was deemed most appropriate for complete closure of the defect.  Using a sterile surgical marker, an appropriate flap was drawn incorporating the defect and placing the expected incisions within the relaxed skin tension lines where possible.    The area thus outlined was incised deep to adipose tissue with a #15 scalpel blade.  The skin margins were undermined to an appropriate distance in all directions utilizing iris scissors.
Mastoid Interpolation Flap Text: A decision was made to reconstruct the defect utilizing an interpolation axial flap and a staged reconstruction.  A telfa template was made of the defect.  This telfa template was then used to outline the mastoid interpolation flap.  The donor area for the pedicle flap was then injected with anesthesia.  The flap was excised through the skin and subcutaneous tissue down to the layer of the underlying musculature.  The pedicle flap was carefully excised within this deep plane to maintain its blood supply.  The edges of the donor site were undermined.   The donor site was closed in a primary fashion.  The pedicle was then rotated into position and sutured.  Once the tube was sutured into place, adequate blood supply was confirmed with blanching and refill.  The pedicle was then wrapped with xeroform gauze and dressed appropriately with a telfa and gauze bandage to ensure continued blood supply and protect the attached pedicle.
X Size Of Lesion In Cm (Optional): 3.8
Elliptical Excision Additional Text (Leave Blank If You Do Not Want): The margin was drawn around the clinically apparent lesion.  An elliptical shape was then drawn on the skin incorporating the lesion and margins.  Incisions were then made along these lines to the appropriate tissue plane and the lesion was extirpated.
Complex Repair And Split-Thickness Skin Graft Text: The defect edges were debeveled with a #15 scalpel blade.  The primary defect was closed partially with a complex linear closure.  Given the location of the defect, shape of the defect and the proximity to free margins a split thickness skin graft was deemed most appropriate to repair the remaining defect.  The graft was trimmed to fit the size of the remaining defect.  The graft was then placed in the primary defect, oriented appropriately, and sutured into place.
Dressing: dry sterile dressing
Suture Removal: 14 days
Complex Repair And A-T Advancement Flap Text: The defect edges were debeveled with a #15 scalpel blade.  The primary defect was closed partially with a complex linear closure.  Given the location of the remaining defect, shape of the defect and the proximity to free margins an A-T advancement flap was deemed most appropriate for complete closure of the defect.  Using a sterile surgical marker, an appropriate advancement flap was drawn incorporating the defect and placing the expected incisions within the relaxed skin tension lines where possible.    The area thus outlined was incised deep to adipose tissue with a #15 scalpel blade.  The skin margins were undermined to an appropriate distance in all directions utilizing iris scissors.
Island Pedicle Flap Text: The defect edges were debeveled with a #15 scalpel blade.  Given the location of the defect, shape of the defect and the proximity to free margins an island pedicle advancement flap was deemed most appropriate.  Using a sterile surgical marker, an appropriate advancement flap was drawn incorporating the defect, outlining the appropriate donor tissue and placing the expected incisions within the relaxed skin tension lines where possible.    The area thus outlined was incised deep to adipose tissue with a #15 scalpel blade.  The skin margins were undermined to an appropriate distance in all directions around the primary defect and laterally outward around the island pedicle utilizing iris scissors.  There was minimal undermining beneath the pedicle flap.
Complex Repair And Ftsg Text: The defect edges were debeveled with a #15 scalpel blade.  The primary defect was closed partially with a complex linear closure.  Given the location of the defect, shape of the defect and the proximity to free margins a full thickness skin graft was deemed most appropriate to repair the remaining defect.  The graft was trimmed to fit the size of the remaining defect.  The graft was then placed in the primary defect, oriented appropriately, and sutured into place.
Mucosal Advancement Flap Text: Given the location of the defect, shape of the defect and the proximity to free margins a mucosal advancement flap was deemed most appropriate. Incisions were made with a 15 blade scalpel in the appropriate fashion along the cutaneous vermilion border and the mucosal lip. The remaining actinically damaged mucosal tissue was excised.  The mucosal advancement flap was then elevated to the gingival sulcus with care taken to preserve the neurovascular structures and advanced into the primary defect. Care was taken to ensure that precise realignment of the vermilion border was achieved.
Posterior Auricular Interpolation Flap Text: A decision was made to reconstruct the defect utilizing an interpolation axial flap and a staged reconstruction.  A telfa template was made of the defect.  This telfa template was then used to outline the posterior auricular interpolation flap.  The donor area for the pedicle flap was then injected with anesthesia.  The flap was excised through the skin and subcutaneous tissue down to the layer of the underlying musculature.  The pedicle flap was carefully excised within this deep plane to maintain its blood supply.  The edges of the donor site were undermined.   The donor site was closed in a primary fashion.  The pedicle was then rotated into position and sutured.  Once the tube was sutured into place, adequate blood supply was confirmed with blanching and refill.  The pedicle was then wrapped with xeroform gauze and dressed appropriately with a telfa and gauze bandage to ensure continued blood supply and protect the attached pedicle.

## 2019-03-21 NOTE — PROCEDURE: COUNSELING
Quality 137: Melanoma: Continuity Of Care - Recall System: Patient information entered into a recall system that includes: target date for the next exam specified AND a process to follow up with patients regarding missed or unscheduled appointments
Quality 138: Melanoma: Coordination Of Care: A treatment plan was communicated to the physicians providing continuing care within one month of diagnosis outlining: diagnosis, tumor thickness and a plan for surgery or alternate care.
Detail Level: Detailed
Quality 397: Melanoma: Reporting: The pathology report that does not include the pT category and a statement on thickness and ulceration and for pT1, mitotic rate, not documented for medical reasons.

## 2019-03-25 ENCOUNTER — APPOINTMENT (RX ONLY)
Dept: URBAN - METROPOLITAN AREA CLINIC 36 | Facility: CLINIC | Age: 54
Setting detail: DERMATOLOGY
End: 2019-03-25

## 2019-03-25 DIAGNOSIS — Z48.817 ENCOUNTER FOR SURGICAL AFTERCARE FOLLOWING SURGERY ON THE SKIN AND SUBCUTANEOUS TISSUE: ICD-10-CM

## 2019-03-25 PROCEDURE — ? DRESSING CHANGE (GLOBAL PERIOD)

## 2019-03-25 PROCEDURE — 99024 POSTOP FOLLOW-UP VISIT: CPT

## 2019-03-25 ASSESSMENT — LOCATION ZONE DERM: LOCATION ZONE: ARM

## 2019-03-25 ASSESSMENT — LOCATION SIMPLE DESCRIPTION DERM: LOCATION SIMPLE: RIGHT SHOULDER

## 2019-03-25 ASSESSMENT — LOCATION DETAILED DESCRIPTION DERM: LOCATION DETAILED: RIGHT POSTERIOR SHOULDER

## 2019-03-25 NOTE — PROCEDURE: DRESSING CHANGE (GLOBAL PERIOD)
Add 88097 Cpt? (Important Note: In 2017 The Use Of 27905 Is Being Tracked By Cms To Determine Future Global Period Reimbursement For Global Periods): yes
Detail Level: Detailed

## 2019-03-26 ENCOUNTER — ANTICOAGULATION MONITORING (OUTPATIENT)
Dept: VASCULAR LAB | Facility: MEDICAL CENTER | Age: 54
End: 2019-03-26

## 2019-03-26 DIAGNOSIS — I48.0 PAROXYSMAL ATRIAL FIBRILLATION (HCC): ICD-10-CM

## 2019-03-26 LAB — INR PPP: 2.7 (ref 2–3.5)

## 2019-03-26 NOTE — PROGRESS NOTES
OP Anticoagulation Service Note    Date: 3/26/2019  Anticoagulation Summary  As of 3/26/2019    INR goal:   2.0-3.0   TTR:   40.4 % (8 mo)   INR used for dosin.7 (3/26/2019)   Warfarin maintenance plan:   7.5 mg (5 mg x 1.5) every day   Weekly warfarin total:   52.5 mg   No change documented:   Merlin Garces Ass't   Plan last modified:   Rachele San (2019)   Next INR check:   2019   Target end date:   Indefinite    Indications    Paroxysmal atrial fibrillation (HCC) [I48.0]  Atrial flutter with rapid ventricular response (HCC) (Resolved) [I48.92]             Anticoagulation Episode Summary     INR check location:       Preferred lab:       Send INR reminders to:       Comments:   Jia KIM      Anticoagulation Care Providers     Provider Role Specialty Phone number    Venusritika LISSA Bedoya Referring Cardiology [phone]    [facility] Responsible  [phone]        Anticoagulation Patient Findings  Patient Findings     Negatives:   Signs/symptoms of thrombosis, Signs/symptoms of bleeding, Laboratory test error suspected, Change in health, Change in alcohol use, Change in activity, Upcoming invasive procedure, Emergency department visit, Upcoming dental procedure, Missed doses, Extra doses, Change in medications, Change in diet/appetite, Hospital admission, Bruising, Other complaints        Plan: Spoke to patient. Patient is therapeutic and will remain on the same dose. Patient reports no unusual bleeding or bruising and no changes to medication or diet. Patient is to be checked again in 2 weeks.    Merlin Garces. Ass't  [facility]    I have reviewed and am in agreement with the above stated plan on 3-26-19.  Johnathan Chen, PharmD

## 2019-04-03 RX ORDER — WARFARIN SODIUM 5 MG/1
TABLET ORAL
Qty: 135 TAB | Refills: 1 | Status: SHIPPED | OUTPATIENT
Start: 2019-04-03 | End: 2019-08-15

## 2019-04-08 ENCOUNTER — HOSPITAL ENCOUNTER (OUTPATIENT)
Dept: RADIOLOGY | Facility: MEDICAL CENTER | Age: 54
End: 2019-04-08
Attending: FAMILY MEDICINE
Payer: MEDICARE

## 2019-04-08 DIAGNOSIS — M25.511 RIGHT SHOULDER PAIN, UNSPECIFIED CHRONICITY: ICD-10-CM

## 2019-04-09 ENCOUNTER — ANTICOAGULATION MONITORING (OUTPATIENT)
Dept: VASCULAR LAB | Facility: MEDICAL CENTER | Age: 54
End: 2019-04-09

## 2019-04-09 DIAGNOSIS — I48.0 PAROXYSMAL ATRIAL FIBRILLATION (HCC): ICD-10-CM

## 2019-04-09 LAB
INR PPP: 2.8 (ref 2–3.5)
INR PPP: 2.9 (ref 2–3.5)

## 2019-04-09 NOTE — PROGRESS NOTES
Anticoagulation Summary  As of 2019    INR goal:   2.0-3.0   TTR:   43.7 % (8.4 mo)   INR used for dosin.9 (2019)   Warfarin maintenance plan:   7.5 mg (5 mg x 1.5) every day   Weekly warfarin total:   52.5 mg   No change documented:   Merlin Medel Ass't   Plan last modified:   Rachele San (2019)   Next INR check:   2019   Target end date:   Indefinite    Indications    Paroxysmal atrial fibrillation (HCC) [I48.0]  Atrial flutter with rapid ventricular response (HCC) (Resolved) [I48.92]             Anticoagulation Episode Summary     INR check location:       Preferred lab:       Send INR reminders to:       Comments:   Jia KIM      Anticoagulation Care Providers     Provider Role Specialty Phone number    Jett Bedoya M.D. Referring Cardiology [phone]    [facility] Responsible  [phone]        Anticoagulation Patient Findings  Patient Findings     Negatives:   Signs/symptoms of thrombosis, Signs/symptoms of bleeding, Laboratory test error suspected, Change in health, Change in alcohol use, Change in activity, Upcoming invasive procedure, Emergency department visit, Upcoming dental procedure, Missed doses, Extra doses, Change in medications, Change in diet/appetite, Hospital admission, Bruising, Other complaints      Spoke with patients wife to report a therapeutic INR.  Pt instructed to continue with current warfarin dosing regimen. Pt denies any s/s of bleeding, bruising, clotting or any changes to diet or medication.  Will follow up in 2 weeks.  Merlin Medel Ass't    I have reviewed and am in agreement with the above stated plan on 19.  Johnathan Chen, PharmD

## 2019-04-10 ENCOUNTER — APPOINTMENT (RX ONLY)
Dept: URBAN - METROPOLITAN AREA CLINIC 36 | Facility: CLINIC | Age: 54
Setting detail: DERMATOLOGY
End: 2019-04-10

## 2019-04-10 PROBLEM — D03.61 MELANOMA IN SITU OF RIGHT UPPER LIMB, INCLUDING SHOULDER: Status: ACTIVE | Noted: 2019-04-10

## 2019-04-10 PROCEDURE — 11606 EXC TR-EXT MAL+MARG >4 CM: CPT

## 2019-04-10 PROCEDURE — ? EXCISION

## 2019-04-10 NOTE — PROCEDURE: EXCISION
Size Of Margin In Cm: 0
Complex Repair And Ftsg Text: The defect edges were debeveled with a #15 scalpel blade.  The primary defect was closed partially with a complex linear closure.  Given the location of the defect, shape of the defect and the proximity to free margins a full thickness skin graft was deemed most appropriate to repair the remaining defect.  The graft was trimmed to fit the size of the remaining defect.  The graft was then placed in the primary defect, oriented appropriately, and sutured into place.
Show Primary And Secondary Defect Sizes Variable (Do Not Hide If You Perform Flaps Or Graft Closures): Yes
Lazy S Complex Repair Preamble Text (Leave Blank If You Do Not Want): Extensive wide undermining was performed.
Burow's Advancement Flap Text: The defect edges were debeveled with a #15 scalpel blade.  Given the location of the defect and the proximity to free margins a Burow's advancement flap was deemed most appropriate.  Using a sterile surgical marker, the appropriate advancement flap was drawn incorporating the defect and placing the expected incisions within the relaxed skin tension lines where possible.    The area thus outlined was incised deep to adipose tissue with a #15 scalpel blade.  The skin margins were undermined to an appropriate distance in all directions utilizing iris scissors.
Tissue Cultured Epidermal Autograft Text: The defect edges were debeveled with a #15 scalpel blade.  Given the location of the defect, shape of the defect and the proximity to free margins a tissue cultured epidermal autograft was deemed most appropriate.  The graft was then trimmed to fit the size of the defect.  The graft was then placed in the primary defect and oriented appropriately.
Bi-Rhombic Flap Text: The defect edges were debeveled with a #15 scalpel blade.  Given the location of the defect and the proximity to free margins a bi-rhombic flap was deemed most appropriate.  Using a sterile surgical marker, an appropriate rhombic flap was drawn incorporating the defect. The area thus outlined was incised deep to adipose tissue with a #15 scalpel blade.  The skin margins were undermined to an appropriate distance in all directions utilizing iris scissors.
V-Y Plasty Text: The defect edges were debeveled with a #15 scalpel blade.  Given the location of the defect, shape of the defect and the proximity to free margins an V-Y advancement flap was deemed most appropriate.  Using a sterile surgical marker, an appropriate advancement flap was drawn incorporating the defect and placing the expected incisions within the relaxed skin tension lines where possible.    The area thus outlined was incised deep to adipose tissue with a #15 scalpel blade.  The skin margins were undermined to an appropriate distance in all directions utilizing iris scissors.
Helical Rim Advancement Flap Text: The defect edges were debeveled with a #15 blade scalpel.  Given the location of the defect and the proximity to free margins (helical rim) a double helical rim advancement flap was deemed most appropriate.  Using a sterile surgical marker, the appropriate advancement flaps were drawn incorporating the defect and placing the expected incisions between the helical rim and antihelix where possible.  The area thus outlined was incised through and through with a #15 scalpel blade.  With a skin hook and iris scissors, the flaps were gently and sharply undermined and freed up.
H Plasty Text: Given the location of the defect, shape of the defect and the proximity to free margins a H-plasty was deemed most appropriate for repair.  Using a sterile surgical marker, the appropriate advancement arms of the H-plasty were drawn incorporating the defect and placing the expected incisions within the relaxed skin tension lines where possible. The area thus outlined was incised deep to adipose tissue with a #15 scalpel blade. The skin margins were undermined to an appropriate distance in all directions utilizing iris scissors.  The opposing advancement arms were then advanced into place in opposite direction and anchored with interrupted buried subcutaneous sutures.
Excision Method: Perilesional
Complex Repair And Split-Thickness Skin Graft Text: The defect edges were debeveled with a #15 scalpel blade.  The primary defect was closed partially with a complex linear closure.  Given the location of the defect, shape of the defect and the proximity to free margins a split thickness skin graft was deemed most appropriate to repair the remaining defect.  The graft was trimmed to fit the size of the remaining defect.  The graft was then placed in the primary defect, oriented appropriately, and sutured into place.
Bill For Surgical Tray: no
Crescentic Advancement Flap Text: The defect edges were debeveled with a #15 scalpel blade.  Given the location of the defect and the proximity to free margins a crescentic advancement flap was deemed most appropriate.  Using a sterile surgical marker, the appropriate advancement flap was drawn incorporating the defect and placing the expected incisions within the relaxed skin tension lines where possible.    The area thus outlined was incised deep to adipose tissue with a #15 scalpel blade.  The skin margins were undermined to an appropriate distance in all directions utilizing iris scissors.
Additional Anesthesia Volume In Cc: 6
Complex Repair And Epidermal Autograft Text: The defect edges were debeveled with a #15 scalpel blade.  The primary defect was closed partially with a complex linear closure.  Given the location of the defect, shape of the defect and the proximity to free margins an epidermal autograft was deemed most appropriate to repair the remaining defect.  The graft was trimmed to fit the size of the remaining defect.  The graft was then placed in the primary defect, oriented appropriately, and sutured into place.
Xenograft Text: The defect edges were debeveled with a #15 scalpel blade.  Given the location of the defect, shape of the defect and the proximity to free margins a xenograft was deemed most appropriate.  The graft was then trimmed to fit the size of the defect.  The graft was then placed in the primary defect and oriented appropriately.
W Plasty Text: The lesion was extirpated to the level of the fat with a #15 scalpel blade.  Given the location of the defect, shape of the defect and the proximity to free margins a W-plasty was deemed most appropriate for repair.  Using a sterile surgical marker, the appropriate transposition arms of the W-plasty were drawn incorporating the defect and placing the expected incisions within the relaxed skin tension lines where possible.    The area thus outlined was incised deep to adipose tissue with a #15 scalpel blade.  The skin margins were undermined to an appropriate distance in all directions utilizing iris scissors.  The opposing transposition arms were then transposed into place in opposite direction and anchored with interrupted buried subcutaneous sutures.
Purse String (Intermediate) Text: Given the location of the defect and the characteristics of the surrounding skin a purse string intermediate closure was deemed most appropriate.  Undermining was performed circumfirentially around the surgical defect.  A purse string suture was then placed and tightened.
A-T Advancement Flap Text: The defect edges were debeveled with a #15 scalpel blade.  Given the location of the defect, shape of the defect and the proximity to free margins an A-T advancement flap was deemed most appropriate.  Using a sterile surgical marker, an appropriate advancement flap was drawn incorporating the defect and placing the expected incisions within the relaxed skin tension lines where possible.    The area thus outlined was incised deep to adipose tissue with a #15 scalpel blade.  The skin margins were undermined to an appropriate distance in all directions utilizing iris scissors.
Bilateral Helical Rim Advancement Flap Text: The defect edges were debeveled with a #15 blade scalpel.  Given the location of the defect and the proximity to free margins (helical rim) a bilateral helical rim advancement flap was deemed most appropriate.  Using a sterile surgical marker, the appropriate advancement flaps were drawn incorporating the defect and placing the expected incisions between the helical rim and antihelix where possible.  The area thus outlined was incised through and through with a #15 scalpel blade.  With a skin hook and iris scissors, the flaps were gently and sharply undermined and freed up.
Complex Repair And Dermal Autograft Text: The defect edges were debeveled with a #15 scalpel blade.  The primary defect was closed partially with a complex linear closure.  Given the location of the defect, shape of the defect and the proximity to free margins an dermal autograft was deemed most appropriate to repair the remaining defect.  The graft was trimmed to fit the size of the remaining defect.  The graft was then placed in the primary defect, oriented appropriately, and sutured into place.
Anesthesia Volume In Cc: 10
Crescentic Intermediate Repair Preamble Text (Leave Blank If You Do Not Want): Undermining was performed with blunt dissection.
O-T Advancement Flap Text: The defect edges were debeveled with a #15 scalpel blade.  Given the location of the defect, shape of the defect and the proximity to free margins an O-T advancement flap was deemed most appropriate.  Using a sterile surgical marker, an appropriate advancement flap was drawn incorporating the defect and placing the expected incisions within the relaxed skin tension lines where possible.    The area thus outlined was incised deep to adipose tissue with a #15 scalpel blade.  The skin margins were undermined to an appropriate distance in all directions utilizing iris scissors.
Purse String (Simple) Text: Given the location of the defect and the characteristics of the surrounding skin a purse string simple closure was deemed most appropriate.  Undermining was performed circumferentially around the surgical defect.  A purse string suture was then placed and tightened.
Epidermal Closure: running subcuticular
Billing Type: Third-Party Bill
Ear Star Wedge Flap Text: The defect edges were debeveled with a #15 blade scalpel.  Given the location of the defect and the proximity to free margins (helical rim) an ear star wedge flap was deemed most appropriate.  Using a sterile surgical marker, the appropriate flap was drawn incorporating the defect and placing the expected incisions between the helical rim and antihelix where possible.  The area thus outlined was incised through and through with a #15 scalpel blade.
Z Plasty Text: The lesion was extirpated to the level of the fat with a #15 scalpel blade.  Given the location of the defect, shape of the defect and the proximity to free margins a Z-plasty was deemed most appropriate for repair.  Using a sterile surgical marker, the appropriate transposition arms of the Z-plasty were drawn incorporating the defect and placing the expected incisions within the relaxed skin tension lines where possible.    The area thus outlined was incised deep to adipose tissue with a #15 scalpel blade.  The skin margins were undermined to an appropriate distance in all directions utilizing iris scissors.  The opposing transposition arms were then transposed into place in opposite direction and anchored with interrupted buried subcutaneous sutures.
Banner Transposition Flap Text: The defect edges were debeveled with a #15 scalpel blade.  Given the location of the defect and the proximity to free margins a Banner transposition flap was deemed most appropriate.  Using a sterile surgical marker, an appropriate flap drawn around the defect. The area thus outlined was incised deep to adipose tissue with a #15 scalpel blade.  The skin margins were undermined to an appropriate distance in all directions utilizing iris scissors.
Cheek Interpolation Flap Text: A decision was made to reconstruct the defect utilizing an interpolation axial flap and a staged reconstruction.  A telfa template was made of the defect.  This telfa template was then used to outline the Cheek Interpolation flap.  The donor area for the pedicle flap was then injected with anesthesia.  The flap was excised through the skin and subcutaneous tissue down to the layer of the underlying musculature.  The interpolation flap was carefully excised within this deep plane to maintain its blood supply.  The edges of the donor site were undermined.   The donor site was closed in a primary fashion.  The pedicle was then rotated into position and sutured.  Once the tube was sutured into place, adequate blood supply was confirmed with blanching and refill.  The pedicle was then wrapped with xeroform gauze and dressed appropriately with a telfa and gauze bandage to ensure continued blood supply and protect the attached pedicle.
Repair Type: None
Complex Repair And Tissue Cultured Epidermal Autograft Text: The defect edges were debeveled with a #15 scalpel blade.  The primary defect was closed partially with a complex linear closure.  Given the location of the defect, shape of the defect and the proximity to free margins an tissue cultured epidermal autograft was deemed most appropriate to repair the remaining defect.  The graft was trimmed to fit the size of the remaining defect.  The graft was then placed in the primary defect, oriented appropriately, and sutured into place.
O-L Flap Text: The defect edges were debeveled with a #15 scalpel blade.  Given the location of the defect, shape of the defect and the proximity to free margins an O-L flap was deemed most appropriate.  Using a sterile surgical marker, an appropriate advancement flap was drawn incorporating the defect and placing the expected incisions within the relaxed skin tension lines where possible.    The area thus outlined was incised deep to adipose tissue with a #15 scalpel blade.  The skin margins were undermined to an appropriate distance in all directions utilizing iris scissors.
Hemostasis: Electrocautery
Complex Repair And Xenograft Text: The defect edges were debeveled with a #15 scalpel blade.  The primary defect was closed partially with a complex linear closure.  Given the location of the defect, shape of the defect and the proximity to free margins a xenograft was deemed most appropriate to repair the remaining defect.  The graft was trimmed to fit the size of the remaining defect.  The graft was then placed in the primary defect, oriented appropriately, and sutured into place.
Partial Purse String (Intermediate) Text: Given the location of the defect and the characteristics of the surrounding skin an intermediate purse string closure was deemed most appropriate.  Undermining was performed circumferentially around the surgical defect.  A purse string suture was then placed and tightened. Wound tension of the circular defect prevented complete closure of the wound.
Partial Purse String (Simple) Text: Given the location of the defect and the characteristics of the surrounding skin a simple purse string closure was deemed most appropriate.  Undermining was performed circumferentially around the surgical defect.  A purse string suture was then placed and tightened. Wound tension of the circular defect prevented complete closure of the wound.
Cheek-To-Nose Interpolation Flap Text: A decision was made to reconstruct the defect utilizing an interpolation axial flap and a staged reconstruction.  A telfa template was made of the defect.  This telfa template was then used to outline the Cheek-To-Nose Interpolation flap.  The donor area for the pedicle flap was then injected with anesthesia.  The flap was excised through the skin and subcutaneous tissue down to the layer of the underlying musculature.  The interpolation flap was carefully excised within this deep plane to maintain its blood supply.  The edges of the donor site were undermined.   The donor site was closed in a primary fashion.  The pedicle was then rotated into position and sutured.  Once the tube was sutured into place, adequate blood supply was confirmed with blanching and refill.  The pedicle was then wrapped with xeroform gauze and dressed appropriately with a telfa and gauze bandage to ensure continued blood supply and protect the attached pedicle.
Anesthesia Volume In Cc: 7
V-Y Flap Text: The defect edges were debeveled with a #15 scalpel blade.  Given the location of the defect, shape of the defect and the proximity to free margins a V-Y flap was deemed most appropriate.  Using a sterile surgical marker, an appropriate advancement flap was drawn incorporating the defect and placing the expected incisions within the relaxed skin tension lines where possible.    The area thus outlined was incised deep to adipose tissue with a #15 scalpel blade.  The skin margins were undermined to an appropriate distance in all directions utilizing iris scissors.
Bilobed Flap Text: The defect edges were debeveled with a #15 scalpel blade.  Given the location of the defect and the proximity to free margins a bilobe flap was deemed most appropriate.  Using a sterile surgical marker, an appropriate bilobe flap drawn around the defect.    The area thus outlined was incised deep to adipose tissue with a #15 scalpel blade.  The skin margins were undermined to an appropriate distance in all directions utilizing iris scissors.
Additional Epidermal Closure (Optional): simple interrupted
Complex Repair And Skin Substitute Graft Text: The defect edges were debeveled with a #15 scalpel blade.  The primary defect was closed partially with a complex linear closure.  Given the location of the remaining defect, shape of the defect and the proximity to free margins a skin substitute graft was deemed most appropriate to repair the remaining defect.  The graft was trimmed to fit the size of the remaining defect.  The graft was then placed in the primary defect, oriented appropriately, and sutured into place.
Advancement-Rotation Flap Text: The defect edges were debeveled with a #15 scalpel blade.  Given the location of the defect, shape of the defect and the proximity to free margins an advancement-rotation flap was deemed most appropriate.  Using a sterile surgical marker, an appropriate flap was drawn incorporating the defect and placing the expected incisions within the relaxed skin tension lines where possible. The area thus outlined was incised deep to adipose tissue with a #15 scalpel blade.  The skin margins were undermined to an appropriate distance in all directions utilizing iris scissors.
Complex Repair And Single Advancement Flap Text: The defect edges were debeveled with a #15 scalpel blade.  The primary defect was closed partially with a complex linear closure.  Given the location of the remaining defect, shape of the defect and the proximity to free margins a single advancement flap was deemed most appropriate for complete closure of the defect.  Using a sterile surgical marker, an appropriate advancement flap was drawn incorporating the defect and placing the expected incisions within the relaxed skin tension lines where possible.    The area thus outlined was incised deep to adipose tissue with a #15 scalpel blade.  The skin margins were undermined to an appropriate distance in all directions utilizing iris scissors.
Bilobed Transposition Flap Text: The defect edges were debeveled with a #15 scalpel blade.  Given the location of the defect and the proximity to free margins a bilobed transposition flap was deemed most appropriate.  Using a sterile surgical marker, an appropriate bilobe flap drawn around the defect.    The area thus outlined was incised deep to adipose tissue with a #15 scalpel blade.  The skin margins were undermined to an appropriate distance in all directions utilizing iris scissors.
Interpolation Flap Text: A decision was made to reconstruct the defect utilizing an interpolation axial flap and a staged reconstruction.  A telfa template was made of the defect.  This telfa template was then used to outline the interpolation flap.  The donor area for the pedicle flap was then injected with anesthesia.  The flap was excised through the skin and subcutaneous tissue down to the layer of the underlying musculature.  The interpolation flap was carefully excised within this deep plane to maintain its blood supply.  The edges of the donor site were undermined.   The donor site was closed in a primary fashion.  The pedicle was then rotated into position and sutured.  Once the tube was sutured into place, adequate blood supply was confirmed with blanching and refill.  The pedicle was then wrapped with xeroform gauze and dressed appropriately with a telfa and gauze bandage to ensure continued blood supply and protect the attached pedicle.
Trilobed Flap Text: The defect edges were debeveled with a #15 scalpel blade.  Given the location of the defect and the proximity to free margins a trilobed flap was deemed most appropriate.  Using a sterile surgical marker, an appropriate trilobed flap drawn around the defect.    The area thus outlined was incised deep to adipose tissue with a #15 scalpel blade.  The skin margins were undermined to an appropriate distance in all directions utilizing iris scissors.
Wound Care: Petrolatum
Path Notes (To The Dermatopathologist): Please check margins.
Curvilinear Excision Additional Text (Leave Blank If You Do Not Want): The margin was drawn around the clinically apparent lesion.  A curvilinear shape was then drawn on the skin incorporating the lesion and margins.  Incisions were then made along these lines to the appropriate tissue plane and the lesion was extirpated.
Melolabial Interpolation Flap Text: A decision was made to reconstruct the defect utilizing an interpolation axial flap and a staged reconstruction.  A telfa template was made of the defect.  This telfa template was then used to outline the melolabial interpolation flap.  The donor area for the pedicle flap was then injected with anesthesia.  The flap was excised through the skin and subcutaneous tissue down to the layer of the underlying musculature.  The pedicle flap was carefully excised within this deep plane to maintain its blood supply.  The edges of the donor site were undermined.   The donor site was closed in a primary fashion.  The pedicle was then rotated into position and sutured.  Once the tube was sutured into place, adequate blood supply was confirmed with blanching and refill.  The pedicle was then wrapped with xeroform gauze and dressed appropriately with a telfa and gauze bandage to ensure continued blood supply and protect the attached pedicle.
Estimated Blood Loss (Cc): minimal
Mercedes Flap Text: The defect edges were debeveled with a #15 scalpel blade.  Given the location of the defect, shape of the defect and the proximity to free margins a Mercedes flap was deemed most appropriate.  Using a sterile surgical marker, an appropriate advancement flap was drawn incorporating the defect and placing the expected incisions within the relaxed skin tension lines where possible. The area thus outlined was incised deep to adipose tissue with a #15 scalpel blade.  The skin margins were undermined to an appropriate distance in all directions utilizing iris scissors.
Lab: [id number]
Complex Repair And Double Advancement Flap Text: The defect edges were debeveled with a #15 scalpel blade.  The primary defect was closed partially with a complex linear closure.  Given the location of the remaining defect, shape of the defect and the proximity to free margins a double advancement flap was deemed most appropriate for complete closure of the defect.  Using a sterile surgical marker, an appropriate advancement flap was drawn incorporating the defect and placing the expected incisions within the relaxed skin tension lines where possible.    The area thus outlined was incised deep to adipose tissue with a #15 scalpel blade.  The skin margins were undermined to an appropriate distance in all directions utilizing iris scissors.
Complex Repair And Modified Advancement Flap Text: The defect edges were debeveled with a #15 scalpel blade.  The primary defect was closed partially with a complex linear closure.  Given the location of the remaining defect, shape of the defect and the proximity to free margins a modified advancement flap was deemed most appropriate for complete closure of the defect.  Using a sterile surgical marker, an appropriate advancement flap was drawn incorporating the defect and placing the expected incisions within the relaxed skin tension lines where possible.    The area thus outlined was incised deep to adipose tissue with a #15 scalpel blade.  The skin margins were undermined to an appropriate distance in all directions utilizing iris scissors.
Mastoid Interpolation Flap Text: A decision was made to reconstruct the defect utilizing an interpolation axial flap and a staged reconstruction.  A telfa template was made of the defect.  This telfa template was then used to outline the mastoid interpolation flap.  The donor area for the pedicle flap was then injected with anesthesia.  The flap was excised through the skin and subcutaneous tissue down to the layer of the underlying musculature.  The pedicle flap was carefully excised within this deep plane to maintain its blood supply.  The edges of the donor site were undermined.   The donor site was closed in a primary fashion.  The pedicle was then rotated into position and sutured.  Once the tube was sutured into place, adequate blood supply was confirmed with blanching and refill.  The pedicle was then wrapped with xeroform gauze and dressed appropriately with a telfa and gauze bandage to ensure continued blood supply and protect the attached pedicle.
Fusiform Excision Additional Text (Leave Blank If You Do Not Want): The margin was drawn around the clinically apparent lesion.  A fusiform shape was then drawn on the skin incorporating the lesion and margins.  Incisions were then made along these lines to the appropriate tissue plane and the lesion was extirpated.
Deep Sutures: 4-0 PDO
Lab Facility: 
Modified Advancement Flap Text: The defect edges were debeveled with a #15 scalpel blade.  Given the location of the defect, shape of the defect and the proximity to free margins a modified advancement flap was deemed most appropriate.  Using a sterile surgical marker, an appropriate advancement flap was drawn incorporating the defect and placing the expected incisions within the relaxed skin tension lines where possible.    The area thus outlined was incised deep to adipose tissue with a #15 scalpel blade.  The skin margins were undermined to an appropriate distance in all directions utilizing iris scissors.
Dorsal Nasal Flap Text: The defect edges were debeveled with a #15 scalpel blade.  Given the location of the defect and the proximity to free margins a dorsal nasal flap was deemed most appropriate.  Using a sterile surgical marker, an appropriate dorsal nasal flap was drawn around the defect.    The area thus outlined was incised deep to adipose tissue with a #15 scalpel blade.  The skin margins were undermined to an appropriate distance in all directions utilizing iris scissors.
Graft Donor Site Bandage (Optional-Leave Blank If You Don't Want In Note): Steri-strips and a pressure bandage were applied to the donor site.
Island Pedicle Flap Text: The defect edges were debeveled with a #15 scalpel blade.  Given the location of the defect, shape of the defect and the proximity to free margins an island pedicle advancement flap was deemed most appropriate.  Using a sterile surgical marker, an appropriate advancement flap was drawn incorporating the defect, outlining the appropriate donor tissue and placing the expected incisions within the relaxed skin tension lines where possible.    The area thus outlined was incised deep to adipose tissue with a #15 scalpel blade.  The skin margins were undermined to an appropriate distance in all directions around the primary defect and laterally outward around the island pedicle utilizing iris scissors.  There was minimal undermining beneath the pedicle flap.
Mucosal Advancement Flap Text: Given the location of the defect, shape of the defect and the proximity to free margins a mucosal advancement flap was deemed most appropriate. Incisions were made with a 15 blade scalpel in the appropriate fashion along the cutaneous vermilion border and the mucosal lip. The remaining actinically damaged mucosal tissue was excised.  The mucosal advancement flap was then elevated to the gingival sulcus with care taken to preserve the neurovascular structures and advanced into the primary defect. Care was taken to ensure that precise realignment of the vermilion border was achieved.
Excision Depth: adipose tissue
Posterior Auricular Interpolation Flap Text: A decision was made to reconstruct the defect utilizing an interpolation axial flap and a staged reconstruction.  A telfa template was made of the defect.  This telfa template was then used to outline the posterior auricular interpolation flap.  The donor area for the pedicle flap was then injected with anesthesia.  The flap was excised through the skin and subcutaneous tissue down to the layer of the underlying musculature.  The pedicle flap was carefully excised within this deep plane to maintain its blood supply.  The edges of the donor site were undermined.   The donor site was closed in a primary fashion.  The pedicle was then rotated into position and sutured.  Once the tube was sutured into place, adequate blood supply was confirmed with blanching and refill.  The pedicle was then wrapped with xeroform gauze and dressed appropriately with a telfa and gauze bandage to ensure continued blood supply and protect the attached pedicle.
Dressing: dry sterile dressing
Complex Repair And A-T Advancement Flap Text: The defect edges were debeveled with a #15 scalpel blade.  The primary defect was closed partially with a complex linear closure.  Given the location of the remaining defect, shape of the defect and the proximity to free margins an A-T advancement flap was deemed most appropriate for complete closure of the defect.  Using a sterile surgical marker, an appropriate advancement flap was drawn incorporating the defect and placing the expected incisions within the relaxed skin tension lines where possible.    The area thus outlined was incised deep to adipose tissue with a #15 scalpel blade.  The skin margins were undermined to an appropriate distance in all directions utilizing iris scissors.
Elliptical Excision Additional Text (Leave Blank If You Do Not Want): The margin was drawn around the clinically apparent lesion.  An elliptical shape was then drawn on the skin incorporating the lesion and margins.  Incisions were then made along these lines to the appropriate tissue plane and the lesion was extirpated.
Island Pedicle Flap With Canthal Suspension Text: The defect edges were debeveled with a #15 scalpel blade.  Given the location of the defect, shape of the defect and the proximity to free margins an island pedicle advancement flap was deemed most appropriate.  Using a sterile surgical marker, an appropriate advancement flap was drawn incorporating the defect, outlining the appropriate donor tissue and placing the expected incisions within the relaxed skin tension lines where possible. The area thus outlined was incised deep to adipose tissue with a #15 scalpel blade.  The skin margins were undermined to an appropriate distance in all directions around the primary defect and laterally outward around the island pedicle utilizing iris scissors.  There was minimal undermining beneath the pedicle flap. A suspension suture was placed in the canthal tendon to prevent tension and prevent ectropion.
Consent was obtained from the patient. The risks and benefits to therapy were discussed in detail. Specifically, the risks of infection, scarring, bleeding, prolonged wound healing, incomplete removal, allergy to anesthesia, nerve injury and recurrence were addressed. Prior to the procedure, the treatment site was clearly identified and confirmed by the patient. All components of Universal Protocol/PAUSE Rule completed.
Saucerization Excision Additional Text (Leave Blank If You Do Not Want): The margin was drawn around the clinically apparent lesion.  Incisions were then made along these lines, in a tangential fashion, to the appropriate tissue plane and the lesion was extirpated.
Paramedian Forehead Flap Text: A decision was made to reconstruct the defect utilizing an interpolation axial flap and a staged reconstruction.  A telfa template was made of the defect.  This telfa template was then used to outline the paramedian forehead pedicle flap.  The donor area for the pedicle flap was then injected with anesthesia.  The flap was excised through the skin and subcutaneous tissue down to the layer of the underlying musculature.  The pedicle flap was carefully excised within this deep plane to maintain its blood supply.  The edges of the donor site were undermined.   The donor site was closed in a primary fashion.  The pedicle was then rotated into position and sutured.  Once the tube was sutured into place, adequate blood supply was confirmed with blanching and refill.  The pedicle was then wrapped with xeroform gauze and dressed appropriately with a telfa and gauze bandage to ensure continued blood supply and protect the attached pedicle.
Hatchet Flap Text: The defect edges were debeveled with a #15 scalpel blade.  Given the location of the defect, shape of the defect and the proximity to free margins a hatchet flap was deemed most appropriate.  Using a sterile surgical marker, an appropriate hatchet flap was drawn incorporating the defect and placing the expected incisions within the relaxed skin tension lines where possible.    The area thus outlined was incised deep to adipose tissue with a #15 scalpel blade.  The skin margins were undermined to an appropriate distance in all directions utilizing iris scissors.
Complex Repair And O-T Advancement Flap Text: The defect edges were debeveled with a #15 scalpel blade.  The primary defect was closed partially with a complex linear closure.  Given the location of the remaining defect, shape of the defect and the proximity to free margins an O-T advancement flap was deemed most appropriate for complete closure of the defect.  Using a sterile surgical marker, an appropriate advancement flap was drawn incorporating the defect and placing the expected incisions within the relaxed skin tension lines where possible.    The area thus outlined was incised deep to adipose tissue with a #15 scalpel blade.  The skin margins were undermined to an appropriate distance in all directions utilizing iris scissors.
Detail Level: Detailed
Complex Repair And O-L Flap Text: The defect edges were debeveled with a #15 scalpel blade.  The primary defect was closed partially with a complex linear closure.  Given the location of the remaining defect, shape of the defect and the proximity to free margins an O-L flap was deemed most appropriate for complete closure of the defect.  Using a sterile surgical marker, an appropriate flap was drawn incorporating the defect and placing the expected incisions within the relaxed skin tension lines where possible.    The area thus outlined was incised deep to adipose tissue with a #15 scalpel blade.  The skin margins were undermined to an appropriate distance in all directions utilizing iris scissors.
Slit Excision Additional Text (Leave Blank If You Do Not Want): A linear line was drawn on the skin overlying the lesion. An incision was made slowly until the lesion was visualized.  Once visualized, the lesion was removed with blunt dissection.
Lip Wedge Excision Repair Text: Given the location of the defect and the proximity to free margins a full thickness wedge repair was deemed most appropriate.  Using a sterile surgical marker, the appropriate repair was drawn incorporating the defect and placing the expected incisions perpendicular to the vermilion border.  The vermilion border was also meticulously outlined to ensure appropriate reapproximation during the repair.  The area thus outlined was incised through and through with a #15 scalpel blade.  The muscularis and dermis were reaproximated with deep sutures following hemostasis. Care was taken to realign the vermilion border before proceeding with the superficial closure.  Once the vermilion was realigned the superfical and mucosal closure was finished.
Rotation Flap Text: The defect edges were debeveled with a #15 scalpel blade.  Given the location of the defect, shape of the defect and the proximity to free margins a rotation flap was deemed most appropriate.  Using a sterile surgical marker, an appropriate rotation flap was drawn incorporating the defect and placing the expected incisions within the relaxed skin tension lines where possible.    The area thus outlined was incised deep to adipose tissue with a #15 scalpel blade.  The skin margins were undermined to an appropriate distance in all directions utilizing iris scissors.
Scalpel Size: 15 blade
Alar Island Pedicle Flap Text: The defect edges were debeveled with a #15 scalpel blade.  Given the location of the defect, shape of the defect and the proximity to the alar rim an island pedicle advancement flap was deemed most appropriate.  Using a sterile surgical marker, an appropriate advancement flap was drawn incorporating the defect, outlining the appropriate donor tissue and placing the expected incisions within the nasal ala running parallel to the alar rim. The area thus outlined was incised with a #15 scalpel blade.  The skin margins were undermined minimally to an appropriate distance in all directions around the primary defect and laterally outward around the island pedicle utilizing iris scissors.  There was minimal undermining beneath the pedicle flap.
Double Island Pedicle Flap Text: The defect edges were debeveled with a #15 scalpel blade.  Given the location of the defect, shape of the defect and the proximity to free margins a double island pedicle advancement flap was deemed most appropriate.  Using a sterile surgical marker, an appropriate advancement flap was drawn incorporating the defect, outlining the appropriate donor tissue and placing the expected incisions within the relaxed skin tension lines where possible.    The area thus outlined was incised deep to adipose tissue with a #15 scalpel blade.  The skin margins were undermined to an appropriate distance in all directions around the primary defect and laterally outward around the island pedicle utilizing iris scissors.  There was minimal undermining beneath the pedicle flap.
Ftsg Text: The defect edges were debeveled with a #15 scalpel blade.  Given the location of the defect, shape of the defect and the proximity to free margins a full thickness skin graft was deemed most appropriate.  Using a sterile surgical marker, the primary defect shape was transferred to the donor site. The area thus outlined was incised deep to adipose tissue with a #15 scalpel blade.  The harvested graft was then trimmed of adipose tissue until only dermis and epidermis was left.  The skin margins of the secondary defect were undermined to an appropriate distance in all directions utilizing iris scissors.  The secondary defect was closed with interrupted buried subcutaneous sutures.  The skin edges were then re-apposed with running  sutures.  The skin graft was then placed in the primary defect and oriented appropriately.
Excisional Biopsy Additional Text (Leave Blank If You Do Not Want): The margin was drawn around the clinically apparent lesion. An elliptical shape was then drawn on the skin incorporating the lesion and margins.  Incisions were then made along these lines to the appropriate tissue plane and the lesion was extirpated.
Complex Repair And Bilobe Flap Text: The defect edges were debeveled with a #15 scalpel blade.  The primary defect was closed partially with a complex linear closure.  Given the location of the remaining defect, shape of the defect and the proximity to free margins a bilobe flap was deemed most appropriate for complete closure of the defect.  Using a sterile surgical marker, an appropriate advancement flap was drawn incorporating the defect and placing the expected incisions within the relaxed skin tension lines where possible.    The area thus outlined was incised deep to adipose tissue with a #15 scalpel blade.  The skin margins were undermined to an appropriate distance in all directions utilizing iris scissors.
Spiral Flap Text: The defect edges were debeveled with a #15 scalpel blade.  Given the location of the defect, shape of the defect and the proximity to free margins a spiral flap was deemed most appropriate.  Using a sterile surgical marker, an appropriate rotation flap was drawn incorporating the defect and placing the expected incisions within the relaxed skin tension lines where possible. The area thus outlined was incised deep to adipose tissue with a #15 scalpel blade.  The skin margins were undermined to an appropriate distance in all directions utilizing iris scissors.
Previous Accession (Optional): [id number]
Perilesional Excision Additional Text (Leave Blank If You Do Not Want): The margin was drawn around the clinically apparent lesion. Incisions were then made along these lines to the appropriate tissue plane and the lesion was extirpated.
Post-Care Instructions: I reviewed with the patient in detail post-care instructions. Patient is not to engage in any heavy lifting, exercise, or swimming for the next 14 days. Should the patient develop any fevers, chills, bleeding, severe pain patient will contact the office immediately.
Split-Thickness Skin Graft Text: The defect edges were debeveled with a #15 scalpel blade.  Given the location of the defect, shape of the defect and the proximity to free margins a split thickness skin graft was deemed most appropriate.  Using a sterile surgical marker, the primary defect shape was transferred to the donor site. The split thickness graft was then harvested.  The skin graft was then placed in the primary defect and oriented appropriately.
Complex Repair And M Plasty Text: The defect edges were debeveled with a #15 scalpel blade.  The primary defect was closed partially with a complex linear closure.  Given the location of the remaining defect, shape of the defect and the proximity to free margins an M plasty was deemed most appropriate for complete closure of the defect.  Using a sterile surgical marker, an appropriate advancement flap was drawn incorporating the defect and placing the expected incisions within the relaxed skin tension lines where possible.    The area thus outlined was incised deep to adipose tissue with a #15 scalpel blade.  The skin margins were undermined to an appropriate distance in all directions utilizing iris scissors.
Star Wedge Flap Text: The defect edges were debeveled with a #15 scalpel blade.  Given the location of the defect, shape of the defect and the proximity to free margins a star wedge flap was deemed most appropriate.  Using a sterile surgical marker, an appropriate rotation flap was drawn incorporating the defect and placing the expected incisions within the relaxed skin tension lines where possible. The area thus outlined was incised deep to adipose tissue with a #15 scalpel blade.  The skin margins were undermined to an appropriate distance in all directions utilizing iris scissors.
Home Suture Removal Text: Patient was provided a home suture removal kit and will remove their sutures at home.  If they have any questions or difficulties they will call the office.
Island Pedicle Flap-Requiring Vessel Identification Text: The defect edges were debeveled with a #15 scalpel blade.  Given the location of the defect, shape of the defect and the proximity to free margins an island pedicle advancement flap was deemed most appropriate.  Using a sterile surgical marker, an appropriate advancement flap was drawn, based on the axial vessel mentioned above, incorporating the defect, outlining the appropriate donor tissue and placing the expected incisions within the relaxed skin tension lines where possible.    The area thus outlined was incised deep to adipose tissue with a #15 scalpel blade.  The skin margins were undermined to an appropriate distance in all directions around the primary defect and laterally outward around the island pedicle utilizing iris scissors.  There was minimal undermining beneath the pedicle flap.
Complex Repair And Melolabial Flap Text: The defect edges were debeveled with a #15 scalpel blade.  The primary defect was closed partially with a complex linear closure.  Given the location of the remaining defect, shape of the defect and the proximity to free margins a melolabial flap was deemed most appropriate for complete closure of the defect.  Using a sterile surgical marker, an appropriate advancement flap was drawn incorporating the defect and placing the expected incisions within the relaxed skin tension lines where possible.    The area thus outlined was incised deep to adipose tissue with a #15 scalpel blade.  The skin margins were undermined to an appropriate distance in all directions utilizing iris scissors.
Complex Repair And Rotation Flap Text: The defect edges were debeveled with a #15 scalpel blade.  The primary defect was closed partially with a complex linear closure.  Given the location of the remaining defect, shape of the defect and the proximity to free margins a rotation flap was deemed most appropriate for complete closure of the defect.  Using a sterile surgical marker, an appropriate advancement flap was drawn incorporating the defect and placing the expected incisions within the relaxed skin tension lines where possible.    The area thus outlined was incised deep to adipose tissue with a #15 scalpel blade.  The skin margins were undermined to an appropriate distance in all directions utilizing iris scissors.
Cartilage Graft Text: The defect edges were debeveled with a #15 scalpel blade.  Given the location of the defect, shape of the defect, the fact the defect involved a full thickness cartilage defect a cartilage graft was deemed most appropriate.  An appropriate donor site was identified, cleansed, and anesthetized. The cartilage graft was then harvested and transferred to the recipient site, oriented appropriately and then sutured into place.  The secondary defect was then repaired using a primary closure.
Transposition Flap Text: The defect edges were debeveled with a #15 scalpel blade.  Given the location of the defect and the proximity to free margins a transposition flap was deemed most appropriate.  Using a sterile surgical marker, an appropriate transposition flap was drawn incorporating the defect.    The area thus outlined was incised deep to adipose tissue with a #15 scalpel blade.  The skin margins were undermined to an appropriate distance in all directions utilizing iris scissors.
Positioning (Leave Blank If You Do Not Want): The patient was placed in a comfortable position exposing the surgical site.
Pre-Excision Curettage Text (Leave Blank If You Do Not Want): Prior to drawing the surgical margin the visible lesion was removed with electrodesiccation and curettage to clearly define the lesion size.
Complex Repair And Double M Plasty Text: The defect edges were debeveled with a #15 scalpel blade.  The primary defect was closed partially with a complex linear closure.  Given the location of the remaining defect, shape of the defect and the proximity to free margins a double M plasty was deemed most appropriate for complete closure of the defect.  Using a sterile surgical marker, an appropriate advancement flap was drawn incorporating the defect and placing the expected incisions within the relaxed skin tension lines where possible.    The area thus outlined was incised deep to adipose tissue with a #15 scalpel blade.  The skin margins were undermined to an appropriate distance in all directions utilizing iris scissors.
Keystone Flap Text: The defect edges were debeveled with a #15 scalpel blade.  Given the location of the defect, shape of the defect a keystone flap was deemed most appropriate.  Using a sterile surgical marker, an appropriate keystone flap was drawn incorporating the defect, outlining the appropriate donor tissue and placing the expected incisions within the relaxed skin tension lines where possible. The area thus outlined was incised deep to adipose tissue with a #15 scalpel blade.  The skin margins were undermined to an appropriate distance in all directions around the primary defect and laterally outward around the flap utilizing iris scissors.
O-T Plasty Text: The defect edges were debeveled with a #15 scalpel blade.  Given the location of the defect, shape of the defect and the proximity to free margins an O-T plasty was deemed most appropriate.  Using a sterile surgical marker, an appropriate O-T plasty was drawn incorporating the defect and placing the expected incisions within the relaxed skin tension lines where possible.    The area thus outlined was incised deep to adipose tissue with a #15 scalpel blade.  The skin margins were undermined to an appropriate distance in all directions utilizing iris scissors.
Composite Graft Text: The defect edges were debeveled with a #15 scalpel blade.  Given the location of the defect, shape of the defect, the proximity to free margins and the fact the defect was full thickness a composite graft was deemed most appropriate.  The defect was outline and then transferred to the donor site.  A full thickness graft was then excised from the donor site. The graft was then placed in the primary defect, oriented appropriately and then sutured into place.  The secondary defect was then repaired using a primary closure.
Repair Performed By Another Provider Text (Leave Blank If You Do Not Want): After the tissue was excised the defect was repaired by another provider.
Complex Repair And Rhombic Flap Text: The defect edges were debeveled with a #15 scalpel blade.  The primary defect was closed partially with a complex linear closure.  Given the location of the remaining defect, shape of the defect and the proximity to free margins a rhombic flap was deemed most appropriate for complete closure of the defect.  Using a sterile surgical marker, an appropriate advancement flap was drawn incorporating the defect and placing the expected incisions within the relaxed skin tension lines where possible.    The area thus outlined was incised deep to adipose tissue with a #15 scalpel blade.  The skin margins were undermined to an appropriate distance in all directions utilizing iris scissors.
Muscle Hinge Flap Text: The defect edges were debeveled with a #15 scalpel blade.  Given the size, depth and location of the defect and the proximity to free margins a muscle hinge flap was deemed most appropriate.  Using a sterile surgical marker, an appropriate hinge flap was drawn incorporating the defect. The area thus outlined was incised with a #15 scalpel blade.  The skin margins were undermined to an appropriate distance in all directions utilizing iris scissors.
Breslow Depth: in situ
Complex Repair And W Plasty Text: The defect edges were debeveled with a #15 scalpel blade.  The primary defect was closed partially with a complex linear closure.  Given the location of the remaining defect, shape of the defect and the proximity to free margins a W plasty was deemed most appropriate for complete closure of the defect.  Using a sterile surgical marker, an appropriate advancement flap was drawn incorporating the defect and placing the expected incisions within the relaxed skin tension lines where possible.    The area thus outlined was incised deep to adipose tissue with a #15 scalpel blade.  The skin margins were undermined to an appropriate distance in all directions utilizing iris scissors.
No Repair - Repaired With Adjacent Surgical Defect Text (Leave Blank If You Do Not Want): After the excision the defect was repaired concurrently with another surgical defect which was in close approximation.
Epidermal Autograft Text: The defect edges were debeveled with a #15 scalpel blade.  Given the location of the defect, shape of the defect and the proximity to free margins an epidermal autograft was deemed most appropriate.  Using a sterile surgical marker, the primary defect shape was transferred to the donor site. The epidermal graft was then harvested.  The skin graft was then placed in the primary defect and oriented appropriately.
Melolabial Transposition Flap Text: The defect edges were debeveled with a #15 scalpel blade.  Given the location of the defect and the proximity to free margins a melolabial flap was deemed most appropriate.  Using a sterile surgical marker, an appropriate melolabial transposition flap was drawn incorporating the defect.    The area thus outlined was incised deep to adipose tissue with a #15 scalpel blade.  The skin margins were undermined to an appropriate distance in all directions utilizing iris scissors.
Complex Repair And Transposition Flap Text: The defect edges were debeveled with a #15 scalpel blade.  The primary defect was closed partially with a complex linear closure.  Given the location of the remaining defect, shape of the defect and the proximity to free margins a transposition flap was deemed most appropriate for complete closure of the defect.  Using a sterile surgical marker, an appropriate advancement flap was drawn incorporating the defect and placing the expected incisions within the relaxed skin tension lines where possible.    The area thus outlined was incised deep to adipose tissue with a #15 scalpel blade.  The skin margins were undermined to an appropriate distance in all directions utilizing iris scissors.
O-Z Plasty Text: The defect edges were debeveled with a #15 scalpel blade.  Given the location of the defect, shape of the defect and the proximity to free margins an O-Z plasty (double transposition flap) was deemed most appropriate.  Using a sterile surgical marker, the appropriate transposition flaps were drawn incorporating the defect and placing the expected incisions within the relaxed skin tension lines where possible.    The area thus outlined was incised deep to adipose tissue with a #15 scalpel blade.  The skin margins were undermined to an appropriate distance in all directions utilizing iris scissors.  Hemostasis was achieved with electrocautery.  The flaps were then transposed into place, one clockwise and the other counterclockwise, and anchored with interrupted buried subcutaneous sutures.
Anesthesia Type: 1% lidocaine with epinephrine
Advancement Flap (Single) Text: The defect edges were debeveled with a #15 scalpel blade.  Given the location of the defect and the proximity to free margins a single advancement flap was deemed most appropriate.  Using a sterile surgical marker, an appropriate advancement flap was drawn incorporating the defect and placing the expected incisions within the relaxed skin tension lines where possible.    The area thus outlined was incised deep to adipose tissue with a #15 scalpel blade.  The skin margins were undermined to an appropriate distance in all directions utilizing iris scissors.
Complex Repair And Z Plasty Text: The defect edges were debeveled with a #15 scalpel blade.  The primary defect was closed partially with a complex linear closure.  Given the location of the remaining defect, shape of the defect and the proximity to free margins a Z plasty was deemed most appropriate for complete closure of the defect.  Using a sterile surgical marker, an appropriate advancement flap was drawn incorporating the defect and placing the expected incisions within the relaxed skin tension lines where possible.    The area thus outlined was incised deep to adipose tissue with a #15 scalpel blade.  The skin margins were undermined to an appropriate distance in all directions utilizing iris scissors.
Rhombic Flap Text: The defect edges were debeveled with a #15 scalpel blade.  Given the location of the defect and the proximity to free margins a rhombic flap was deemed most appropriate.  Using a sterile surgical marker, an appropriate rhombic flap was drawn incorporating the defect.    The area thus outlined was incised deep to adipose tissue with a #15 scalpel blade.  The skin margins were undermined to an appropriate distance in all directions utilizing iris scissors.
Size Of Lesion In Cm: 4.8
Complex Repair And V-Y Plasty Text: The defect edges were debeveled with a #15 scalpel blade.  The primary defect was closed partially with a complex linear closure.  Given the location of the remaining defect, shape of the defect and the proximity to free margins a V-Y plasty was deemed most appropriate for complete closure of the defect.  Using a sterile surgical marker, an appropriate advancement flap was drawn incorporating the defect and placing the expected incisions within the relaxed skin tension lines where possible.    The area thus outlined was incised deep to adipose tissue with a #15 scalpel blade.  The skin margins were undermined to an appropriate distance in all directions utilizing iris scissors.
Anesthesia Type: 0.5% lidocaine with 1:200,000 epinephrine and a 1:10 solution of 8.4% sodium bicarbonate and 408mcg clindamycin/ml
Complex Repair And Dorsal Nasal Flap Text: The defect edges were debeveled with a #15 scalpel blade.  The primary defect was closed partially with a complex linear closure.  Given the location of the remaining defect, shape of the defect and the proximity to free margins a dorsal nasal flap was deemed most appropriate for complete closure of the defect.  Using a sterile surgical marker, an appropriate flap was drawn incorporating the defect and placing the expected incisions within the relaxed skin tension lines where possible.    The area thus outlined was incised deep to adipose tissue with a #15 scalpel blade.  The skin margins were undermined to an appropriate distance in all directions utilizing iris scissors.
Double O-Z Plasty Text: The defect edges were debeveled with a #15 scalpel blade.  Given the location of the defect, shape of the defect and the proximity to free margins a Double O-Z plasty (double transposition flap) was deemed most appropriate.  Using a sterile surgical marker, the appropriate transposition flaps were drawn incorporating the defect and placing the expected incisions within the relaxed skin tension lines where possible. The area thus outlined was incised deep to adipose tissue with a #15 scalpel blade.  The skin margins were undermined to an appropriate distance in all directions utilizing iris scissors.  Hemostasis was achieved with electrocautery.  The flaps were then transposed into place, one clockwise and the other counterclockwise, and anchored with interrupted buried subcutaneous sutures.
X Size Of Lesion In Cm (Optional): 1.1
Dermal Autograft Text: The defect edges were debeveled with a #15 scalpel blade.  Given the location of the defect, shape of the defect and the proximity to free margins a dermal autograft was deemed most appropriate.  Using a sterile surgical marker, the primary defect shape was transferred to the donor site. The area thus outlined was incised deep to adipose tissue with a #15 scalpel blade.  The harvested graft was then trimmed of adipose and epidermal tissue until only dermis was left.  The skin graft was then placed in the primary defect and oriented appropriately.
S Plasty Text: Given the location and shape of the defect, and the orientation of relaxed skin tension lines, an S-plasty was deemed most appropriate for repair.  Using a sterile surgical marker, the appropriate outline of the S-plasty was drawn, incorporating the defect and placing the expected incisions within the relaxed skin tension lines where possible.  The area thus outlined was incised deep to adipose tissue with a #15 scalpel blade.  The skin margins were undermined to an appropriate distance in all directions utilizing iris scissors. The skin flaps were advanced over the defect.  The opposing margins were then approximated with interrupted buried subcutaneous sutures.
Suture Removal: 14 days
Skin Substitute Text: The defect edges were debeveled with a #15 scalpel blade.  Given the location of the defect, shape of the defect and the proximity to free margins a skin substitute graft was deemed most appropriate.  The graft material was trimmed to fit the size of the defect. The graft was then placed in the primary defect and oriented appropriately.
Advancement Flap (Double) Text: The defect edges were debeveled with a #15 scalpel blade.  Given the location of the defect and the proximity to free margins a double advancement flap was deemed most appropriate.  Using a sterile surgical marker, the appropriate advancement flaps were drawn incorporating the defect and placing the expected incisions within the relaxed skin tension lines where possible.    The area thus outlined was incised deep to adipose tissue with a #15 scalpel blade.  The skin margins were undermined to an appropriate distance in all directions utilizing iris scissors.
Information: Selecting Yes will display possible errors in your note based on the variables you have selected. This validation is only offered as a suggestion for you. PLEASE NOTE THAT THE VALIDATION TEXT WILL BE REMOVED WHEN YOU FINALIZE YOUR NOTE. IF YOU WANT TO FAX A PRELIMINARY NOTE YOU WILL NEED TO TOGGLE THIS TO 'NO' IF YOU DO NOT WANT IT IN YOUR FAXED NOTE.
Rhomboid Transposition Flap Text: The defect edges were debeveled with a #15 scalpel blade.  Given the location of the defect and the proximity to free margins a rhomboid transposition flap was deemed most appropriate.  Using a sterile surgical marker, an appropriate rhomboid flap was drawn incorporating the defect.    The area thus outlined was incised deep to adipose tissue with a #15 scalpel blade.  The skin margins were undermined to an appropriate distance in all directions utilizing iris scissors.

## 2019-04-18 ENCOUNTER — APPOINTMENT (RX ONLY)
Dept: URBAN - METROPOLITAN AREA CLINIC 36 | Facility: CLINIC | Age: 54
Setting detail: DERMATOLOGY
End: 2019-04-18

## 2019-04-18 PROBLEM — D03.61 MELANOMA IN SITU OF RIGHT UPPER LIMB, INCLUDING SHOULDER: Status: ACTIVE | Noted: 2019-04-18

## 2019-04-18 PROCEDURE — 15272 SKIN SUB GRAFT T/A/L ADD-ON: CPT

## 2019-04-18 PROCEDURE — ? REPAIR NOTE

## 2019-04-18 PROCEDURE — 15271 SKIN SUB GRAFT TRNK/ARM/LEG: CPT

## 2019-04-18 NOTE — PROCEDURE: REPAIR NOTE
Non-Graft Cartilage Fenestration Text: The cartilage was fenestrated with a 2mm punch biopsy to help facilitate healing.
Alar Island Pedicle Flap Text: The defect edges were debeveled with a #15 scalpel blade.  Given the location of the defect, shape of the defect and the proximity to the alar rim an island pedicle advancement flap was deemed most appropriate.  Using a sterile surgical marker, an appropriate advancement flap was drawn incorporating the defect, outlining the appropriate donor tissue and placing the expected incisions within the nasal ala running parallel to the alar rim. The area thus outlined was incised with a #15 scalpel blade.  The skin margins were undermined minimally to an appropriate distance in all directions around the primary defect and laterally outward around the island pedicle utilizing iris scissors.  There was minimal undermining beneath the pedicle flap.
Cheiloplasty (Less Than 50%) Text: A decision was made to reconstruct the defect with a  cheiloplasty.  The defect was undermined extensively.  Additional obicularis oris muscle was excised with a 15 blade scalpel.  The defect was converted into a full thickness wedge, of less than 50% of the vertical height of the lip, to facilite a better cosmetic result.  Small vessels were then tied off with 5-0 monocyrl. The obicularis oris, superficial fascia, adipose and dermis were then reapproximated.  After the deeper layers were approximated the epidermis was reapproximated with particular care given to realign the vermilion border.
Double Island Pedicle Flap Text: The defect edges were debeveled with a #15 scalpel blade.  Given the location of the defect, shape of the defect and the proximity to free margins a double island pedicle advancement flap was deemed most appropriate.  Using a sterile surgical marker, an appropriate advancement flap was drawn incorporating the defect, outlining the appropriate donor tissue and placing the expected incisions within the relaxed skin tension lines where possible.    The area thus outlined was incised deep to adipose tissue with a #15 scalpel blade.  The skin margins were undermined to an appropriate distance in all directions around the primary defect and laterally outward around the island pedicle utilizing iris scissors.  There was minimal undermining beneath the pedicle flap.
Cheiloplasty (Complex) Text: A decision was made to reconstruct the defect with a  cheiloplasty.  The defect was undermined extensively.  Additional obicularis oris muscle was excised with a 15 blade scalpel.  The defect was converted into a full thickness wedge to facilite a better cosmetic result.  Small vessels were then tied off with 5-0 monocyrl. The obicularis oris, superficial fascia, adipose and dermis were then reapproximated.  After the deeper layers were approximated the epidermis was reapproximated with particular care given to realign the vermilion border.
Validate That Anesthesia Volume Is Not Zero (If You Leave At 0 It Will Not Render In Note): No
Tarsorrhaphy Text: A tarsorrhaphy was performed using Frost sutures.
Mercedes Flap Text: The defect edges were debeveled with a #15 scalpel blade.  Given the location of the defect, shape of the defect and the proximity to free margins a Mercedes flap was deemed most appropriate.  Using a sterile surgical marker, an appropriate advancement flap was drawn incorporating the defect and placing the expected incisions within the relaxed skin tension lines where possible. The area thus outlined was incised deep to adipose tissue with a #15 scalpel blade.  The skin margins were undermined to an appropriate distance in all directions utilizing iris scissors.
Spiral Flap Text: The defect edges were debeveled with a #15 scalpel blade.  Given the location of the defect, shape of the defect and the proximity to free margins a spiral flap was deemed most appropriate.  Using a sterile surgical marker, an appropriate rotation flap was drawn incorporating the defect and placing the expected incisions within the relaxed skin tension lines where possible. The area thus outlined was incised deep to adipose tissue with a #15 scalpel blade.  The skin margins were undermined to an appropriate distance in all directions utilizing iris scissors.
Graft Cartilage Fenestration Text: The cartilage was fenestrated with a 2mm punch biopsy to help facilitate graft survival and healing.
Ear Wedge Repair Text: A wedge excision was completed by carrying down an excision through the full thickness of the ear and cartilage with an inward facing Burow's triangle. The wound was then closed in a layered fashion.
Star Wedge Flap Text: The defect edges were debeveled with a #15 scalpel blade.  Given the location of the defect, shape of the defect and the proximity to free margins a star wedge flap was deemed most appropriate.  Using a sterile surgical marker, an appropriate rotation flap was drawn incorporating the defect and placing the expected incisions within the relaxed skin tension lines where possible. The area thus outlined was incised deep to adipose tissue with a #15 scalpel blade.  The skin margins were undermined to an appropriate distance in all directions utilizing iris scissors.
Complex Repair And Flap Additional Text (Will Appearing After The Standard Complex Repair Text): The complex repair was not sufficient to completely close the primary defect. The remaining additional defect was repaired with the flap mentioned below.
Island Pedicle Flap-Requiring Vessel Identification Text: The defect edges were debeveled with a #15 scalpel blade.  Given the location of the defect, shape of the defect and the proximity to free margins an island pedicle advancement flap was deemed most appropriate.  Using a sterile surgical marker, an appropriate advancement flap was drawn, based on the axial vessel mentioned above, incorporating the defect, outlining the appropriate donor tissue and placing the expected incisions within the relaxed skin tension lines where possible.    The area thus outlined was incised deep to adipose tissue with a #15 scalpel blade.  The skin margins were undermined to an appropriate distance in all directions around the primary defect and laterally outward around the island pedicle utilizing iris scissors.  There was minimal undermining beneath the pedicle flap.
Complex Repair And Graft Additional Text (Will Appearing After The Standard Complex Repair Text): The complex repair was not sufficient to completely close the primary defect. The remaining additional defect was repaired with the graft mentioned below.
Additional Anesthesia Volume In Cc: 23
Transposition Flap Text: The defect edges were debeveled with a #15 scalpel blade.  Given the location of the defect and the proximity to free margins a transposition flap was deemed most appropriate.  Using a sterile surgical marker, an appropriate transposition flap was drawn incorporating the defect.    The area thus outlined was incised deep to adipose tissue with a #15 scalpel blade.  The skin margins were undermined to an appropriate distance in all directions utilizing iris scissors.
Keystone Flap Text: The defect edges were debeveled with a #15 scalpel blade.  Given the location of the defect, shape of the defect a keystone flap was deemed most appropriate.  Using a sterile surgical marker, an appropriate keystone flap was drawn incorporating the defect, outlining the appropriate donor tissue and placing the expected incisions within the relaxed skin tension lines where possible. The area thus outlined was incised deep to adipose tissue with a #15 scalpel blade.  The skin margins were undermined to an appropriate distance in all directions around the primary defect and laterally outward around the flap utilizing iris scissors.
Information: Selecting Yes will display possible errors in your note based on the variables you have selected. This validation is only offered as a suggestion for you. PLEASE NOTE THAT THE VALIDATION TEXT WILL BE REMOVED WHEN YOU FINALIZE YOUR NOTE. IF YOU WANT TO FAX A PRELIMINARY NOTE YOU WILL NEED TO TOGGLE THIS TO 'NO' IF YOU DO NOT WANT IT IN YOUR FAXED NOTE.
Secondary Intention Text (Leave Blank If You Do Not Want): The defect will heal with secondary intention.
No Repair - Repaired With Adjacent Surgical Defect Text (Leave Blank If You Do Not Want): After obtaining clear surgical margins the defect was repaired concurrently with another surgical defect which was in close approximation.
Full Thickness Lip Wedge Repair (Flap) Text: Given the location of the defect and the proximity to free margins a full thickness wedge repair was deemed most appropriate.  Using a sterile surgical marker, the appropriate repair was drawn incorporating the defect and placing the expected incisions perpendicular to the vermilion border.  The vermilion border was also meticulously outlined to ensure appropriate reapproximation during the repair.  The area thus outlined was incised through and through with a #15 scalpel blade.  The muscularis and dermis were reaproximated with deep sutures following hemostasis. Care was taken to realign the vermilion border before proceeding with the superficial closure.  Once the vermilion was realigned the superfical and mucosal closure was finished.
O-T Plasty Text: The defect edges were debeveled with a #15 scalpel blade.  Given the location of the defect, shape of the defect and the proximity to free margins an O-T plasty was deemed most appropriate.  Using a sterile surgical marker, an appropriate O-T plasty was drawn incorporating the defect and placing the expected incisions within the relaxed skin tension lines where possible.    The area thus outlined was incised deep to adipose tissue with a #15 scalpel blade.  The skin margins were undermined to an appropriate distance in all directions utilizing iris scissors.
Ftsg Text: The defect edges were debeveled with a #15 scalpel blade.  Given the location of the defect, shape of the defect and the proximity to free margins a full thickness skin graft was deemed most appropriate.  Using a sterile surgical marker, the primary defect shape was transferred to the donor site. The area thus outlined was incised deep to adipose tissue with a #15 scalpel blade.  The harvested graft was then trimmed of adipose tissue until only dermis and epidermis was left.  The skin margins of the secondary defect were undermined to an appropriate distance in all directions utilizing iris scissors.  The secondary defect was closed with interrupted buried subcutaneous sutures.  The skin edges were then re-apposed with running  sutures.  The skin graft was then placed in the primary defect and oriented appropriately.
Epidermal Closure: simple interrupted
Show Asc Variables: Yes
Hemostasis: Electrocautery
Closure 4 Information: This tab is for additional flaps and grafts above and beyond our usual structured repairs.  Please note if you enter information here it will not currently bill and you will need to add the billing information manually.
Graft Type: Xenograft
Referred To Oculoplastics For Closure Text (Leave Blank If You Do Not Want): After obtaining clear surgical margins the patient was sent to oculoplastics for surgical repair.  The patient understands they will receive post-surgical care and follow-up from the referring physician's office.
Muscle Hinge Flap Text: The defect edges were debeveled with a #15 scalpel blade.  Given the size, depth and location of the defect and the proximity to free margins a muscle hinge flap was deemed most appropriate.  Using a sterile surgical marker, an appropriate hinge flap was drawn incorporating the defect. The area thus outlined was incised with a #15 scalpel blade.  The skin margins were undermined to an appropriate distance in all directions utilizing iris scissors.
Split-Thickness Skin Graft Text: The defect edges were debeveled with a #15 scalpel blade.  Given the location of the defect, shape of the defect and the proximity to free margins a split thickness skin graft was deemed most appropriate.  Using a sterile surgical marker, the primary defect shape was transferred to the donor site. The split thickness graft was then harvested.  The skin graft was then placed in the primary defect and oriented appropriately.
Melolabial Transposition Flap Text: The defect edges were debeveled with a #15 scalpel blade.  Given the location of the defect and the proximity to free margins a melolabial flap was deemed most appropriate.  Using a sterile surgical marker, an appropriate melolabial transposition flap was drawn incorporating the defect.    The area thus outlined was incised deep to adipose tissue with a #15 scalpel blade.  The skin margins were undermined to an appropriate distance in all directions utilizing iris scissors.
X Size Of Lesion In Cm (Optional): 0
O-Z Plasty Text: The defect edges were debeveled with a #15 scalpel blade.  Given the location of the defect, shape of the defect and the proximity to free margins an O-Z plasty (double transposition flap) was deemed most appropriate.  Using a sterile surgical marker, the appropriate transposition flaps were drawn incorporating the defect and placing the expected incisions within the relaxed skin tension lines where possible.    The area thus outlined was incised deep to adipose tissue with a #15 scalpel blade.  The skin margins were undermined to an appropriate distance in all directions utilizing iris scissors.  Hemostasis was achieved with electrocautery.  The flaps were then transposed into place, one clockwise and the other counterclockwise, and anchored with interrupted buried subcutaneous sutures.
Estimated Blood Loss (Cc): minimal
Double O-Z Plasty Text: The defect edges were debeveled with a #15 scalpel blade.  Given the location of the defect, shape of the defect and the proximity to free margins a Double O-Z plasty (double transposition flap) was deemed most appropriate.  Using a sterile surgical marker, the appropriate transposition flaps were drawn incorporating the defect and placing the expected incisions within the relaxed skin tension lines where possible. The area thus outlined was incised deep to adipose tissue with a #15 scalpel blade.  The skin margins were undermined to an appropriate distance in all directions utilizing iris scissors.  Hemostasis was achieved with electrocautery.  The flaps were then transposed into place, one clockwise and the other counterclockwise, and anchored with interrupted buried subcutaneous sutures.
Referred To Otolaryngology For Closure Text (Leave Blank If You Do Not Want): After obtaining clear surgical margins the patient was sent to otolaryngology for surgical repair.  The patient understands they will receive post-surgical care and follow-up from the referring physician's office.
Referred To Plastics For Closure Text (Leave Blank If You Do Not Want): After obtaining clear surgical margins the patient was sent to plastics for surgical repair.  The patient understands they will receive post-surgical care and follow-up from the referring physician's office.
Graft Donor Site Bandage (Optional-Leave Blank If You Don't Want In Note): Aquaphor, Telfa, and a pressure bandage were applied to the donor site.
Cartilage Graft Text: The defect edges were debeveled with a #15 scalpel blade.  Given the location of the defect, shape of the defect, the fact the defect involved a full thickness cartilage defect a cartilage graft was deemed most appropriate.  An appropriate donor site was identified, cleansed, and anesthetized. The cartilage graft was then harvested and transferred to the recipient site, oriented appropriately and then sutured into place.  The secondary defect was then repaired using a primary closure.
Rhombic Flap Text: The defect edges were debeveled with a #15 scalpel blade.  Given the location of the defect and the proximity to free margins a rhombic flap was deemed most appropriate.  Using a sterile surgical marker, an appropriate rhombic flap was drawn incorporating the defect.    The area thus outlined was incised deep to adipose tissue with a #15 scalpel blade.  The skin margins were undermined to an appropriate distance in all directions utilizing iris scissors.
S Plasty Text: Given the location and shape of the defect, and the orientation of relaxed skin tension lines, an S-plasty was deemed most appropriate for repair.  Using a sterile surgical marker, the appropriate outline of the S-plasty was drawn, incorporating the defect and placing the expected incisions within the relaxed skin tension lines where possible.  The area thus outlined was incised deep to adipose tissue with a #15 scalpel blade.  The skin margins were undermined to an appropriate distance in all directions utilizing iris scissors. The skin flaps were advanced over the defect.  The opposing margins were then approximated with interrupted buried subcutaneous sutures.
Composite Graft Text: The defect edges were debeveled with a #15 scalpel blade.  Given the location of the defect, shape of the defect, the proximity to free margins and the fact the defect was full thickness a composite graft was deemed most appropriate.  The defect was outline and then transferred to the donor site.  A full thickness graft was then excised from the donor site. The graft was then placed in the primary defect, oriented appropriately and then sutured into place.  The secondary defect was then repaired using a primary closure.
Primary Defect Length (In Cm): 6.2
Referred To Asc For Closure Text (Leave Blank If You Do Not Want): After obtaining clear surgical margins the patient was sent to an ASC for surgical repair.  The patient understands they will receive post-surgical care and follow-up from the ASC physician.
Rhomboid Transposition Flap Text: The defect edges were debeveled with a #15 scalpel blade.  Given the location of the defect and the proximity to free margins a rhomboid transposition flap was deemed most appropriate.  Using a sterile surgical marker, an appropriate rhomboid flap was drawn incorporating the defect.    The area thus outlined was incised deep to adipose tissue with a #15 scalpel blade.  The skin margins were undermined to an appropriate distance in all directions utilizing iris scissors.
Epidermal Autograft Text: The defect edges were debeveled with a #15 scalpel blade.  Given the location of the defect, shape of the defect and the proximity to free margins an epidermal autograft was deemed most appropriate.  Using a sterile surgical marker, the primary defect shape was transferred to the donor site. The epidermal graft was then harvested.  The skin graft was then placed in the primary defect and oriented appropriately.
Closure 2 Information: This tab is for additional flaps and grafts, including complex repair and grafts and complex repair and flaps. You can also specify a different location for the additional defect, if the location is the same you do not need to select a new one. We will insert the automated text for the repair you select below just as we do for solitary flaps and grafts. Please note that at this time if you select a location with a different insurance zone you will need to override the ICD10 and CPT if appropriate.
Bi-Rhombic Flap Text: The defect edges were debeveled with a #15 scalpel blade.  Given the location of the defect and the proximity to free margins a bi-rhombic flap was deemed most appropriate.  Using a sterile surgical marker, an appropriate rhombic flap was drawn incorporating the defect. The area thus outlined was incised deep to adipose tissue with a #15 scalpel blade.  The skin margins were undermined to an appropriate distance in all directions utilizing iris scissors.
Wound Care: Petrolatum
V-Y Plasty Text: The defect edges were debeveled with a #15 scalpel blade.  Given the location of the defect, shape of the defect and the proximity to free margins an V-Y advancement flap was deemed most appropriate.  Using a sterile surgical marker, an appropriate advancement flap was drawn incorporating the defect and placing the expected incisions within the relaxed skin tension lines where possible.    The area thus outlined was incised deep to adipose tissue with a #15 scalpel blade.  The skin margins were undermined to an appropriate distance in all directions utilizing iris scissors.
H Plasty Text: Given the location of the defect, shape of the defect and the proximity to free margins a H-plasty was deemed most appropriate for repair.  Using a sterile surgical marker, the appropriate advancement arms of the H-plasty were drawn incorporating the defect and placing the expected incisions within the relaxed skin tension lines where possible. The area thus outlined was incised deep to adipose tissue with a #15 scalpel blade. The skin margins were undermined to an appropriate distance in all directions utilizing iris scissors.  The opposing advancement arms were then advanced into place in opposite direction and anchored with interrupted buried subcutaneous sutures.
Referred To Mid-Level For Closure Text (Leave Blank If You Do Not Want): After obtaining clear surgical margins the patient was sent to a mid-level provider for surgical repair.  The patient understands they will receive post-surgical care and follow-up from the mid-level provider.
Repair Performed By Another Provider Text (Leave Blank If You Do Not Want): After obtaining clear surgical margins the defect was repaired by another provider.
Dermal Autograft Text: The defect edges were debeveled with a #15 scalpel blade.  Given the location of the defect, shape of the defect and the proximity to free margins a dermal autograft was deemed most appropriate.  Using a sterile surgical marker, the primary defect shape was transferred to the donor site. The area thus outlined was incised deep to adipose tissue with a #15 scalpel blade.  The harvested graft was then trimmed of adipose and epidermal tissue until only dermis was left.  The skin graft was then placed in the primary defect and oriented appropriately.
Primary Defect Width (In Cm): 6.5
Helical Rim Advancement Flap Text: The defect edges were debeveled with a #15 blade scalpel.  Given the location of the defect and the proximity to free margins (helical rim) a double helical rim advancement flap was deemed most appropriate.  Using a sterile surgical marker, the appropriate advancement flaps were drawn incorporating the defect and placing the expected incisions between the helical rim and antihelix where possible.  The area thus outlined was incised through and through with a #15 scalpel blade.  With a skin hook and iris scissors, the flaps were gently and sharply undermined and freed up.
Dressing: dry sterile dressing
W Plasty Text: The lesion was extirpated to the level of the fat with a #15 scalpel blade.  Given the location of the defect, shape of the defect and the proximity to free margins a W-plasty was deemed most appropriate for repair.  Using a sterile surgical marker, the appropriate transposition arms of the W-plasty were drawn incorporating the defect and placing the expected incisions within the relaxed skin tension lines where possible.    The area thus outlined was incised deep to adipose tissue with a #15 scalpel blade.  The skin margins were undermined to an appropriate distance in all directions utilizing iris scissors.  The opposing transposition arms were then transposed into place in opposite direction and anchored with interrupted buried subcutaneous sutures.
Detail Level: Detailed
Skin Substitute Text: The defect edges were debeveled with a #15 scalpel blade.  Given the location of the defect, shape of the defect and the proximity to free margins a skin substitute graft was deemed most appropriate.  The graft material was trimmed to fit the size of the defect. The graft was then placed in the primary defect and oriented appropriately.
Complex Repair Preamble Text (Leave Blank If You Do Not Want): Extensive wide undermining was performed.
Intermediate Repair Preamble Text (Leave Blank If You Do Not Want): Undermining was performed with blunt dissection.
Bilateral Helical Rim Advancement Flap Text: The defect edges were debeveled with a #15 blade scalpel.  Given the location of the defect and the proximity to free margins (helical rim) a bilateral helical rim advancement flap was deemed most appropriate.  Using a sterile surgical marker, the appropriate advancement flaps were drawn incorporating the defect and placing the expected incisions between the helical rim and antihelix where possible.  The area thus outlined was incised through and through with a #15 scalpel blade.  With a skin hook and iris scissors, the flaps were gently and sharply undermined and freed up.
Ear Star Wedge Flap Text: The defect edges were debeveled with a #15 blade scalpel.  Given the location of the defect and the proximity to free margins (helical rim) an ear star wedge flap was deemed most appropriate.  Using a sterile surgical marker, the appropriate flap was drawn incorporating the defect and placing the expected incisions between the helical rim and antihelix where possible.  The area thus outlined was incised through and through with a #15 scalpel blade.
Z Plasty Text: The lesion was extirpated to the level of the fat with a #15 scalpel blade.  Given the location of the defect, shape of the defect and the proximity to free margins a Z-plasty was deemed most appropriate for repair.  Using a sterile surgical marker, the appropriate transposition arms of the Z-plasty were drawn incorporating the defect and placing the expected incisions within the relaxed skin tension lines where possible.    The area thus outlined was incised deep to adipose tissue with a #15 scalpel blade.  The skin margins were undermined to an appropriate distance in all directions utilizing iris scissors.  The opposing transposition arms were then transposed into place in opposite direction and anchored with interrupted buried subcutaneous sutures.
Advancement Flap (Single) Text: The defect edges were debeveled with a #15 scalpel blade.  Given the location of the defect and the proximity to free margins a single advancement flap was deemed most appropriate.  Using a sterile surgical marker, an appropriate advancement flap was drawn incorporating the defect and placing the expected incisions within the relaxed skin tension lines where possible.    The area thus outlined was incised deep to adipose tissue with a #15 scalpel blade.  The skin margins were undermined to an appropriate distance in all directions utilizing iris scissors.
Skin Substitute Paste Text: The defect edges were debeveled with a #15 scalpel blade.  Given the location of the defect, shape of the defect and the proximity to free margins a skin substitute micronized graft was deemed most appropriate.  The entire vial contents were admixed with 0.5ccs of sterile saline, formed into a paste and then evenly spread over the entire wound bed.
Advancement Flap (Double) Text: The defect edges were debeveled with a #15 scalpel blade.  Given the location of the defect and the proximity to free margins a double advancement flap was deemed most appropriate.  Using a sterile surgical marker, the appropriate advancement flaps were drawn incorporating the defect and placing the expected incisions within the relaxed skin tension lines where possible.    The area thus outlined was incised deep to adipose tissue with a #15 scalpel blade.  The skin margins were undermined to an appropriate distance in all directions utilizing iris scissors.
Cheek Interpolation Flap Division And Inset Text: Division and inset of the cheek interpolation flap was performed to achieve optimal aesthetic result, restore normal anatomic appearance and avoid distortion of normal anatomy, expedite and facilitate wound healing, achieve optimal functional result and because linear closure either not possible or would produce suboptimal result. The patient was prepped and draped in the usual manner. The pedicle was infiltrated with local anesthesia. The pedicle was sectioned with a #15 blade. The pedicle was de-bulked and trimmed to match the shape of the defect. Hemostasis was achieved. The flap donor site and free margin of the flap were secured with deep buried sutures and the wound edges were re-approximated.
Cheek Interpolation Flap Text: A decision was made to reconstruct the defect utilizing an interpolation axial flap and a staged reconstruction.  A telfa template was made of the defect.  This telfa template was then used to outline the Cheek Interpolation flap.  The donor area for the pedicle flap was then injected with anesthesia.  The flap was excised through the skin and subcutaneous tissue down to the layer of the underlying musculature.  The interpolation flap was carefully excised within this deep plane to maintain its blood supply.  The edges of the donor site were undermined.   The donor site was closed in a primary fashion.  The pedicle was then rotated into position and sutured.  Once the tube was sutured into place, adequate blood supply was confirmed with blanching and refill.  The pedicle was then wrapped with xeroform gauze and dressed appropriately with a telfa and gauze bandage to ensure continued blood supply and protect the attached pedicle.
Skin Substitute Injection Text: The defect edges were debeveled with a #15 scalpel blade.  Given the location of the defect, shape of the defect and the proximity to free margins a skin substitute micronized graft was deemed most appropriate.  The entire vial contents were admixed with 3.0ccs of sterile saline and then injected subcutaneously throughout the entire wound bed.
Banner Transposition Flap Text: The defect edges were debeveled with a #15 scalpel blade.  Given the location of the defect and the proximity to free margins a Banner transposition flap was deemed most appropriate.  Using a sterile surgical marker, an appropriate flap drawn around the defect. The area thus outlined was incised deep to adipose tissue with a #15 scalpel blade.  The skin margins were undermined to an appropriate distance in all directions utilizing iris scissors.
Cheek-To-Nose Interpolation Flap Text: A decision was made to reconstruct the defect utilizing an interpolation axial flap and a staged reconstruction.  A telfa template was made of the defect.  This telfa template was then used to outline the Cheek-To-Nose Interpolation flap.  The donor area for the pedicle flap was then injected with anesthesia.  The flap was excised through the skin and subcutaneous tissue down to the layer of the underlying musculature.  The interpolation flap was carefully excised within this deep plane to maintain its blood supply.  The edges of the donor site were undermined.   The donor site was closed in a primary fashion.  The pedicle was then rotated into position and sutured.  Once the tube was sutured into place, adequate blood supply was confirmed with blanching and refill.  The pedicle was then wrapped with xeroform gauze and dressed appropriately with a telfa and gauze bandage to ensure continued blood supply and protect the attached pedicle.
Burow's Advancement Flap Text: The defect edges were debeveled with a #15 scalpel blade.  Given the location of the defect and the proximity to free margins a Burow's advancement flap was deemed most appropriate.  Using a sterile surgical marker, the appropriate advancement flap was drawn incorporating the defect and placing the expected incisions within the relaxed skin tension lines where possible.    The area thus outlined was incised deep to adipose tissue with a #15 scalpel blade.  The skin margins were undermined to an appropriate distance in all directions utilizing iris scissors.
Tissue Cultured Epidermal Autograft Text: The defect edges were debeveled with a #15 scalpel blade.  Given the location of the defect, shape of the defect and the proximity to free margins a tissue cultured epidermal autograft was deemed most appropriate.  The graft was then trimmed to fit the size of the defect.  The graft was then placed in the primary defect and oriented appropriately.
Skin Substitute: EZ DERM
Bilobed Flap Text: The defect edges were debeveled with a #15 scalpel blade.  Given the location of the defect and the proximity to free margins a bilobe flap was deemed most appropriate.  Using a sterile surgical marker, an appropriate bilobe flap drawn around the defect.    The area thus outlined was incised deep to adipose tissue with a #15 scalpel blade.  The skin margins were undermined to an appropriate distance in all directions utilizing iris scissors.
Cheek To Nose Interpolation Flap Division And Inset Text: Division and inset of the cheek to nose interpolation flap was performed to achieve optimal aesthetic result, restore normal anatomic appearance and avoid distortion of normal anatomy, expedite and facilitate wound healing, achieve optimal functional result and because linear closure either not possible or would produce suboptimal result. The patient was prepped and draped in the usual manner. The pedicle was infiltrated with local anesthesia. The pedicle was sectioned with a #15 blade. The pedicle was de-bulked and trimmed to match the shape of the defect. Hemostasis was achieved. The flap donor site and free margin of the flap were secured with deep buried sutures and the wound edges were re-approximated.
Melolabial Interpolation Flap Division And Inset Text: Division and inset of the melolabial interpolation flap was performed to achieve optimal aesthetic result, restore normal anatomic appearance and avoid distortion of normal anatomy, expedite and facilitate wound healing, achieve optimal functional result and because linear closure either not possible or would produce suboptimal result. The patient was prepped and draped in the usual manner. The pedicle was infiltrated with local anesthesia. The pedicle was sectioned with a #15 blade. The pedicle was de-bulked and trimmed to match the shape of the defect. Hemostasis was achieved. The flap donor site and free margin of the flap were secured with deep buried sutures and the wound edges were re-approximated.
Bilobed Transposition Flap Text: The defect edges were debeveled with a #15 scalpel blade.  Given the location of the defect and the proximity to free margins a bilobed transposition flap was deemed most appropriate.  Using a sterile surgical marker, an appropriate bilobe flap drawn around the defect.    The area thus outlined was incised deep to adipose tissue with a #15 scalpel blade.  The skin margins were undermined to an appropriate distance in all directions utilizing iris scissors.
Unna Boot Text: An Unna boot was placed to help immobilize the limb and facilitate more rapid healing.
Interpolation Flap Text: A decision was made to reconstruct the defect utilizing an interpolation axial flap and a staged reconstruction.  A telfa template was made of the defect.  This telfa template was then used to outline the interpolation flap.  The donor area for the pedicle flap was then injected with anesthesia.  The flap was excised through the skin and subcutaneous tissue down to the layer of the underlying musculature.  The interpolation flap was carefully excised within this deep plane to maintain its blood supply.  The edges of the donor site were undermined.   The donor site was closed in a primary fashion.  The pedicle was then rotated into position and sutured.  Once the tube was sutured into place, adequate blood supply was confirmed with blanching and refill.  The pedicle was then wrapped with xeroform gauze and dressed appropriately with a telfa and gauze bandage to ensure continued blood supply and protect the attached pedicle.
Xenograft Text: The defect edges were debeveled with a #15 scalpel blade.  Given the location of the defect, shape of the defect and the proximity to free margins a xenograft was deemed most appropriate.  The graft was then trimmed to fit the size of the defect.  The graft was then placed in the primary defect and oriented appropriately.
Crescentic Advancement Flap Text: The defect edges were debeveled with a #15 scalpel blade.  Given the location of the defect and the proximity to free margins a crescentic advancement flap was deemed most appropriate.  Using a sterile surgical marker, the appropriate advancement flap was drawn incorporating the defect and placing the expected incisions within the relaxed skin tension lines where possible.    The area thus outlined was incised deep to adipose tissue with a #15 scalpel blade.  The skin margins were undermined to an appropriate distance in all directions utilizing iris scissors.
A-T Advancement Flap Text: The defect edges were debeveled with a #15 scalpel blade.  Given the location of the defect, shape of the defect and the proximity to free margins an A-T advancement flap was deemed most appropriate.  Using a sterile surgical marker, an appropriate advancement flap was drawn incorporating the defect and placing the expected incisions within the relaxed skin tension lines where possible.    The area thus outlined was incised deep to adipose tissue with a #15 scalpel blade.  The skin margins were undermined to an appropriate distance in all directions utilizing iris scissors.
Suture Removal: 14 days
Melolabial Interpolation Flap Text: A decision was made to reconstruct the defect utilizing an interpolation axial flap and a staged reconstruction.  A telfa template was made of the defect.  This telfa template was then used to outline the melolabial interpolation flap.  The donor area for the pedicle flap was then injected with anesthesia.  The flap was excised through the skin and subcutaneous tissue down to the layer of the underlying musculature.  The pedicle flap was carefully excised within this deep plane to maintain its blood supply.  The edges of the donor site were undermined.   The donor site was closed in a primary fashion.  The pedicle was then rotated into position and sutured.  Once the tube was sutured into place, adequate blood supply was confirmed with blanching and refill.  The pedicle was then wrapped with xeroform gauze and dressed appropriately with a telfa and gauze bandage to ensure continued blood supply and protect the attached pedicle.
Purse String (Simple) Text: Given the location of the defect and the characteristics of the surrounding skin a purse string closure was deemed most appropriate.  Undermining was performed circumfirentially around the surgical defect.  A purse string suture was then placed and tightened.
Trilobed Flap Text: The defect edges were debeveled with a #15 scalpel blade.  Given the location of the defect and the proximity to free margins a trilobed flap was deemed most appropriate.  Using a sterile surgical marker, an appropriate trilobed flap drawn around the defect.    The area thus outlined was incised deep to adipose tissue with a #15 scalpel blade.  The skin margins were undermined to an appropriate distance in all directions utilizing iris scissors.
Mastoid Interpolation Flap Division And Inset Text: Division and inset of the mastoid interpolation flap was performed to achieve optimal aesthetic result, restore normal anatomic appearance and avoid distortion of normal anatomy, expedite and facilitate wound healing, achieve optimal functional result and because linear closure either not possible or would produce suboptimal result. The patient was prepped and draped in the usual manner. The pedicle was infiltrated with local anesthesia. The pedicle was sectioned with a #15 blade. The pedicle was de-bulked and trimmed to match the shape of the defect. Hemostasis was achieved. The flap donor site and free margin of the flap were secured with deep buried sutures and the wound edges were re-approximated.
Skin Substitute: EpiFix Micronized
Anesthesia Volume In Cc: 9
Modified Advancement Flap Text: The defect edges were debeveled with a #15 scalpel blade.  Given the location of the defect, shape of the defect and the proximity to free margins a modified advancement flap was deemed most appropriate.  Using a sterile surgical marker, an appropriate advancement flap was drawn incorporating the defect and placing the expected incisions within the relaxed skin tension lines where possible.    The area thus outlined was incised deep to adipose tissue with a #15 scalpel blade.  The skin margins were undermined to an appropriate distance in all directions utilizing iris scissors.
Purse String (Intermediate) Text: Given the location of the defect and the characteristics of the surrounding skin a purse string intermediate closure was deemed most appropriate.  Undermining was performed circumfirentially around the surgical defect.  A purse string suture was then placed and tightened.
O-T Advancement Flap Text: The defect edges were debeveled with a #15 scalpel blade.  Given the location of the defect, shape of the defect and the proximity to free margins an O-T advancement flap was deemed most appropriate.  Using a sterile surgical marker, an appropriate advancement flap was drawn incorporating the defect and placing the expected incisions within the relaxed skin tension lines where possible.    The area thus outlined was incised deep to adipose tissue with a #15 scalpel blade.  The skin margins were undermined to an appropriate distance in all directions utilizing iris scissors.
Dorsal Nasal Flap Text: The defect edges were debeveled with a #15 scalpel blade.  Given the location of the defect and the proximity to free margins a dorsal nasal flap was deemed most appropriate.  Using a sterile surgical marker, an appropriate dorsal nasal flap was drawn around the defect.    The area thus outlined was incised deep to adipose tissue with a #15 scalpel blade.  The skin margins were undermined to an appropriate distance in all directions utilizing iris scissors.
Paramedian Forehead Flap Division And Inset Text: Division and inset of the paramedian forehead flap was performed to achieve optimal aesthetic result, restore normal anatomic appearance and avoid distortion of normal anatomy, expedite and facilitate wound healing, achieve optimal functional result and because linear closure either not possible or would produce suboptimal result. The patient was prepped and draped in the usual manner. The pedicle was infiltrated with local anesthesia. The pedicle was sectioned with a #15 blade. The pedicle was de-bulked and trimmed to match the shape of the defect. Hemostasis was achieved. The flap donor site and free margin of the flap were secured with deep buried sutures and the wound edges were re-approximated.
Mastoid Interpolation Flap Text: A decision was made to reconstruct the defect utilizing an interpolation axial flap and a staged reconstruction.  A telfa template was made of the defect.  This telfa template was then used to outline the mastoid interpolation flap.  The donor area for the pedicle flap was then injected with anesthesia.  The flap was excised through the skin and subcutaneous tissue down to the layer of the underlying musculature.  The pedicle flap was carefully excised within this deep plane to maintain its blood supply.  The edges of the donor site were undermined.   The donor site was closed in a primary fashion.  The pedicle was then rotated into position and sutured.  Once the tube was sutured into place, adequate blood supply was confirmed with blanching and refill.  The pedicle was then wrapped with xeroform gauze and dressed appropriately with a telfa and gauze bandage to ensure continued blood supply and protect the attached pedicle.
Skin Substitute Units (Will Override Primary Defect Units If Greater Than 0): 10
Island Pedicle Flap Text: The defect edges were debeveled with a #15 scalpel blade.  Given the location of the defect, shape of the defect and the proximity to free margins an island pedicle advancement flap was deemed most appropriate.  Using a sterile surgical marker, an appropriate advancement flap was drawn incorporating the defect, outlining the appropriate donor tissue and placing the expected incisions within the relaxed skin tension lines where possible.    The area thus outlined was incised deep to adipose tissue with a #15 scalpel blade.  The skin margins were undermined to an appropriate distance in all directions around the primary defect and laterally outward around the island pedicle utilizing iris scissors.  There was minimal undermining beneath the pedicle flap.
Posterior Auricular Interpolation Flap Division And Inset Text: Division and inset of the posterior auricular interpolation flap was performed to achieve optimal aesthetic result, restore normal anatomic appearance and avoid distortion of normal anatomy, expedite and facilitate wound healing, achieve optimal functional result and because linear closure either not possible or would produce suboptimal result. The patient was prepped and draped in the usual manner. The pedicle was infiltrated with local anesthesia. The pedicle was sectioned with a #15 blade. The pedicle was de-bulked and trimmed to match the shape of the defect. Hemostasis was achieved. The flap donor site and free margin of the flap were secured with deep buried sutures and the wound edges were re-approximated.
Posterior Auricular Interpolation Flap Text: A decision was made to reconstruct the defect utilizing an interpolation axial flap and a staged reconstruction.  A telfa template was made of the defect.  This telfa template was then used to outline the posterior auricular interpolation flap.  The donor area for the pedicle flap was then injected with anesthesia.  The flap was excised through the skin and subcutaneous tissue down to the layer of the underlying musculature.  The pedicle flap was carefully excised within this deep plane to maintain its blood supply.  The edges of the donor site were undermined.   The donor site was closed in a primary fashion.  The pedicle was then rotated into position and sutured.  Once the tube was sutured into place, adequate blood supply was confirmed with blanching and refill.  The pedicle was then wrapped with xeroform gauze and dressed appropriately with a telfa and gauze bandage to ensure continued blood supply and protect the attached pedicle.
Epidermal Sutures: 4-0 Fast Absorbing Gut
Repair Type: Graft
O-L Flap Text: The defect edges were debeveled with a #15 scalpel blade.  Given the location of the defect, shape of the defect and the proximity to free margins an O-L flap was deemed most appropriate.  Using a sterile surgical marker, an appropriate advancement flap was drawn incorporating the defect and placing the expected incisions within the relaxed skin tension lines where possible.    The area thus outlined was incised deep to adipose tissue with a #15 scalpel blade.  The skin margins were undermined to an appropriate distance in all directions utilizing iris scissors.
Partial Purse String (Simple) Text: Given the location of the defect and the characteristics of the surrounding skin a simple purse string closure was deemed most appropriate.  Undermining was performed circumfirentially around the surgical defect.  A purse string suture was then placed and tightened. Wound tension only allowed a partial closure of the circular defect.
Mucosal Advancement Flap Text: Given the location of the defect, shape of the defect and the proximity to free margins a mucosal advancement flap was deemed most appropriate. Incisions were made with a 15 blade scalpel in the appropriate fashion along the cutaneous vermilion border and the mucosal lip. The remaining actinically damaged mucosal tissue was excised.  The mucosal advancement flap was then elevated to the gingival sulcus with care taken to preserve the neurovascular structures and advanced into the primary defect. Care was taken to ensure that precise realignment of the vermilion border was achieved.
Consent: The rationale for the repair was explained to the patient and consent was obtained. The risks, benefits and alternatives to therapy were discussed in detail. Specifically, the risks of infection, scarring, bleeding, prolonged wound healing, incomplete removal, allergy to anesthesia, nerve injury and recurrence were addressed. Prior to the procedure, the treatment site was clearly identified and confirmed by the patient. All components of Universal Protocol/PAUSE Rule completed.
V-Y Flap Text: The defect edges were debeveled with a #15 scalpel blade.  Given the location of the defect, shape of the defect and the proximity to free margins a V-Y flap was deemed most appropriate.  Using a sterile surgical marker, an appropriate advancement flap was drawn incorporating the defect and placing the expected incisions within the relaxed skin tension lines where possible.    The area thus outlined was incised deep to adipose tissue with a #15 scalpel blade.  The skin margins were undermined to an appropriate distance in all directions utilizing iris scissors.
Paramedian Forehead Flap Text: A decision was made to reconstruct the defect utilizing an interpolation axial flap and a staged reconstruction.  A telfa template was made of the defect.  This telfa template was then used to outline the paramedian forehead pedicle flap.  The donor area for the pedicle flap was then injected with anesthesia.  The flap was excised through the skin and subcutaneous tissue down to the layer of the underlying musculature.  The pedicle flap was carefully excised within this deep plane to maintain its blood supply.  The edges of the donor site were undermined.   The donor site was closed in a primary fashion.  The pedicle was then rotated into position and sutured.  Once the tube was sutured into place, adequate blood supply was confirmed with blanching and refill.  The pedicle was then wrapped with xeroform gauze and dressed appropriately with a telfa and gauze bandage to ensure continued blood supply and protect the attached pedicle.
Hatchet Flap Text: The defect edges were debeveled with a #15 scalpel blade.  Given the location of the defect, shape of the defect and the proximity to free margins a hatchet flap was deemed most appropriate.  Using a sterile surgical marker, an appropriate hatchet flap was drawn incorporating the defect and placing the expected incisions within the relaxed skin tension lines where possible.    The area thus outlined was incised deep to adipose tissue with a #15 scalpel blade.  The skin margins were undermined to an appropriate distance in all directions utilizing iris scissors.
Partial Purse String (Intermediate) Text: Given the location of the defect and the characteristics of the surrounding skin an intermediate purse string closure was deemed most appropriate.  Undermining was performed circumfirentially around the surgical defect.  A purse string suture was then placed and tightened. Wound tension only allowed a partial closure of the circular defect.
Anesthesia Type: 0.5% lidocaine with 1:200,000 epinephrine and a 1:10 solution of 4.2% sodium bicarbonate
Manual Repair Warning Statement: We plan on removing the manually selected variable below in favor of our much easier automatic structured text blocks found in the previous tab. We decided to do this to help make the flow better and give you the full power of structured data. Manual selection is never going to be ideal in our platform and I would encourage you to avoid using manual selection from this point on, especially since I will be sunsetting this feature. It is important that you do one of two things with the customized text below. First, you can save all of the text in a word file so you can have it for future reference. Second, transfer the text to the appropriate area in the Library tab. Lastly, if there is a flap or graft type which we do not have you need to let us know right away so I can add it in before the variable is hidden. No need to panic, we plan to give you roughly 6 months to make the change.
Island Pedicle Flap With Canthal Suspension Text: The defect edges were debeveled with a #15 scalpel blade.  Given the location of the defect, shape of the defect and the proximity to free margins an island pedicle advancement flap was deemed most appropriate.  Using a sterile surgical marker, an appropriate advancement flap was drawn incorporating the defect, outlining the appropriate donor tissue and placing the expected incisions within the relaxed skin tension lines where possible. The area thus outlined was incised deep to adipose tissue with a #15 scalpel blade.  The skin margins were undermined to an appropriate distance in all directions around the primary defect and laterally outward around the island pedicle utilizing iris scissors.  There was minimal undermining beneath the pedicle flap. A suspension suture was placed in the canthal tendon to prevent tension and prevent ectropion.
Localized Dermabrasion With Wire Brush Text: The patient was draped in routine manner.  Localized dermabrasion using 3 x 17 mm wire brush was performed in routine manner to papillary dermis. This spot dermabrasion is being performed to complete skin cancer reconstruction. It also will eliminate the other sun damaged precancerous cells that are known to be part of the regional effect of a lifetime's worth of sun exposure. This localized dermabrasion is therapeutic and should not be considered cosmetic in any regard.
Rotation Flap Text: The defect edges were debeveled with a #15 scalpel blade.  Given the location of the defect, shape of the defect and the proximity to free margins a rotation flap was deemed most appropriate.  Using a sterile surgical marker, an appropriate rotation flap was drawn incorporating the defect and placing the expected incisions within the relaxed skin tension lines where possible.    The area thus outlined was incised deep to adipose tissue with a #15 scalpel blade.  The skin margins were undermined to an appropriate distance in all directions utilizing iris scissors.
Advancement-Rotation Flap Text: The defect edges were debeveled with a #15 scalpel blade.  Given the location of the defect, shape of the defect and the proximity to free margins an advancement-rotation flap was deemed most appropriate.  Using a sterile surgical marker, an appropriate flap was drawn incorporating the defect and placing the expected incisions within the relaxed skin tension lines where possible. The area thus outlined was incised deep to adipose tissue with a #15 scalpel blade.  The skin margins were undermined to an appropriate distance in all directions utilizing iris scissors.
Post-Care Instructions: I reviewed with the patient in detail post-care instructions. Patient is not to engage in any heavy lifting, exercise, or swimming for the next 14 days. Should the patient develop any fevers, chills, bleeding, severe pain patient will contact the office immediately.
Type Of Previous Surgery (Optional- Ie Mohs Surgery): slow mohs

## 2019-04-23 ENCOUNTER — ANTICOAGULATION MONITORING (OUTPATIENT)
Dept: MEDICAL GROUP | Facility: MEDICAL CENTER | Age: 54
End: 2019-04-23

## 2019-04-23 ENCOUNTER — ANTICOAGULATION MONITORING (OUTPATIENT)
Dept: VASCULAR LAB | Facility: MEDICAL CENTER | Age: 54
End: 2019-04-23

## 2019-04-23 DIAGNOSIS — I48.0 PAROXYSMAL ATRIAL FIBRILLATION (HCC): ICD-10-CM

## 2019-04-23 LAB — INR PPP: 1.4 (ref 2–3.5)

## 2019-04-23 NOTE — PROGRESS NOTES
Anticoagulation Summary  As of 2019    INR goal:   2.0-3.0   TTR:   44.5 % (8.9 mo)   INR used for dosin.4! (2019)   Warfarin maintenance plan:   7.5 mg (5 mg x 1.5) every day   Weekly warfarin total:   52.5 mg   Plan last modified:   Rachele San (2019)   Next INR check:   2019   Target end date:   Indefinite    Indications    Paroxysmal atrial fibrillation (HCC) [I48.0]  Atrial flutter with rapid ventricular response (HCC) (Resolved) [I48.92]             Anticoagulation Episode Summary     INR check location:       Preferred lab:       Send INR reminders to:       Comments:   Jia KIM      Anticoagulation Care Providers     Provider Role Specialty Phone number    Jett Bedoya M.D. Referring Cardiology [phone]    [facility] Anticoagulation Services Responsible  [phone]        Anticoagulation Patient Findings    Spoke with patient's wife to report a SUB-therapeutic INR of 1.4    Patient's wife denies any change to current medications, diet, or any unusual s/sx of bleeding, bruising, or clotting. Instructed patient's wife to contact clinic with any questions or concerns.     Patient to BOLUS with 10 mg of warfarin TODAY only, and then continue with current warfarin regimen as stated above. Follow-up INR in 2 weeks, May 7.    Ashley Crandall, Pharmacy Intern     I have reviewed and concur with the above plan     Rafi Chaudhary, PharmD

## 2019-04-24 ENCOUNTER — RX ONLY (OUTPATIENT)
Age: 54
Setting detail: RX ONLY
End: 2019-04-24

## 2019-04-24 ENCOUNTER — APPOINTMENT (RX ONLY)
Dept: URBAN - METROPOLITAN AREA CLINIC 36 | Facility: CLINIC | Age: 54
Setting detail: DERMATOLOGY
End: 2019-04-24

## 2019-04-24 DIAGNOSIS — Z48.817 ENCOUNTER FOR SURGICAL AFTERCARE FOLLOWING SURGERY ON THE SKIN AND SUBCUTANEOUS TISSUE: ICD-10-CM

## 2019-04-24 PROCEDURE — ? DRESSING CHANGE (GLOBAL PERIOD)

## 2019-04-24 PROCEDURE — 99024 POSTOP FOLLOW-UP VISIT: CPT

## 2019-04-24 RX ORDER — CEPHALEXIN 500 MG/1
CAPSULE ORAL
Qty: 42 | Refills: 0 | Status: ERX | COMMUNITY
Start: 2019-04-24

## 2019-04-24 ASSESSMENT — LOCATION ZONE DERM: LOCATION ZONE: ARM

## 2019-04-24 ASSESSMENT — LOCATION SIMPLE DESCRIPTION DERM: LOCATION SIMPLE: RIGHT SHOULDER

## 2019-04-24 ASSESSMENT — LOCATION DETAILED DESCRIPTION DERM: LOCATION DETAILED: RIGHT POSTERIOR SHOULDER

## 2019-04-24 NOTE — PROCEDURE: DRESSING CHANGE (GLOBAL PERIOD)
Detail Level: Detailed
Add 59315 Cpt? (Important Note: In 2017 The Use Of 19276 Is Being Tracked By Cms To Determine Future Global Period Reimbursement For Global Periods): yes

## 2019-04-26 ENCOUNTER — APPOINTMENT (OUTPATIENT)
Dept: HEMATOLOGY ONCOLOGY | Facility: MEDICAL CENTER | Age: 54
End: 2019-04-26
Payer: MEDICARE

## 2019-05-02 ENCOUNTER — APPOINTMENT (RX ONLY)
Dept: URBAN - METROPOLITAN AREA CLINIC 36 | Facility: CLINIC | Age: 54
Setting detail: DERMATOLOGY
End: 2019-05-02

## 2019-05-02 DIAGNOSIS — Z48.817 ENCOUNTER FOR SURGICAL AFTERCARE FOLLOWING SURGERY ON THE SKIN AND SUBCUTANEOUS TISSUE: ICD-10-CM

## 2019-05-02 PROCEDURE — 99024 POSTOP FOLLOW-UP VISIT: CPT

## 2019-05-02 PROCEDURE — ? POST-OP WOUND CHECK

## 2019-05-02 ASSESSMENT — LOCATION DETAILED DESCRIPTION DERM: LOCATION DETAILED: RIGHT POSTERIOR SHOULDER

## 2019-05-02 ASSESSMENT — LOCATION ZONE DERM: LOCATION ZONE: ARM

## 2019-05-02 ASSESSMENT — LOCATION SIMPLE DESCRIPTION DERM: LOCATION SIMPLE: RIGHT SHOULDER

## 2019-05-02 NOTE — PROCEDURE: POST-OP WOUND CHECK
Detail Level: Detailed
Add 14797 Cpt? (Important Note: In 2017 The Use Of 43356 Is Being Tracked By Cms To Determine Future Global Period Reimbursement For Global Periods): yes

## 2019-05-07 ENCOUNTER — ANTICOAGULATION MONITORING (OUTPATIENT)
Dept: VASCULAR LAB | Facility: MEDICAL CENTER | Age: 54
End: 2019-05-07

## 2019-05-07 ENCOUNTER — OFFICE VISIT (OUTPATIENT)
Dept: CARDIOLOGY | Facility: MEDICAL CENTER | Age: 54
End: 2019-05-07
Payer: MEDICARE

## 2019-05-07 VITALS
SYSTOLIC BLOOD PRESSURE: 104 MMHG | HEART RATE: 76 BPM | BODY MASS INDEX: 37.18 KG/M2 | OXYGEN SATURATION: 92 % | HEIGHT: 77 IN | DIASTOLIC BLOOD PRESSURE: 60 MMHG

## 2019-05-07 DIAGNOSIS — I25.5 ISCHEMIC CARDIOMYOPATHY: ICD-10-CM

## 2019-05-07 DIAGNOSIS — I50.20 ACC/AHA STAGE C SYSTOLIC HEART FAILURE (HCC): ICD-10-CM

## 2019-05-07 DIAGNOSIS — I48.0 PAROXYSMAL ATRIAL FIBRILLATION (HCC): ICD-10-CM

## 2019-05-07 DIAGNOSIS — F17.200 SMOKING: ICD-10-CM

## 2019-05-07 DIAGNOSIS — I25.10 CORONARY ARTERY DISEASE INVOLVING NATIVE CORONARY ARTERY OF NATIVE HEART WITHOUT ANGINA PECTORIS: ICD-10-CM

## 2019-05-07 DIAGNOSIS — E78.5 DYSLIPIDEMIA: ICD-10-CM

## 2019-05-07 DIAGNOSIS — Z79.899 HIGH RISK MEDICATION USE: ICD-10-CM

## 2019-05-07 DIAGNOSIS — R06.09 DYSPNEA ON EXERTION: ICD-10-CM

## 2019-05-07 DIAGNOSIS — I48.91 ATRIAL FIBRILLATION, CONTROLLED (HCC): ICD-10-CM

## 2019-05-07 DIAGNOSIS — I10 HTN (HYPERTENSION), MALIGNANT: ICD-10-CM

## 2019-05-07 DIAGNOSIS — I50.9 HEART FAILURE, NYHA CLASS 2 (HCC): ICD-10-CM

## 2019-05-07 DIAGNOSIS — E78.2 MIXED HYPERLIPIDEMIA: ICD-10-CM

## 2019-05-07 LAB — INR PPP: 2.8 (ref 2–3.5)

## 2019-05-07 PROCEDURE — 99406 BEHAV CHNG SMOKING 3-10 MIN: CPT | Performed by: INTERNAL MEDICINE

## 2019-05-07 PROCEDURE — 99215 OFFICE O/P EST HI 40 MIN: CPT | Mod: 25 | Performed by: INTERNAL MEDICINE

## 2019-05-07 RX ORDER — SPIRONOLACTONE 25 MG/1
25 TABLET ORAL DAILY
Qty: 90 TAB | Refills: 3 | Status: SHIPPED | OUTPATIENT
Start: 2019-05-07 | End: 2019-08-15

## 2019-05-07 RX ORDER — CEPHALEXIN 500 MG/1
CAPSULE ORAL
Refills: 0 | COMMUNITY
Start: 2019-04-24 | End: 2019-06-11

## 2019-05-07 RX ORDER — LISINOPRIL 2.5 MG/1
2.5 TABLET ORAL DAILY
Qty: 90 TAB | Refills: 3 | Status: SHIPPED | OUTPATIENT
Start: 2019-05-07 | End: 2019-08-15

## 2019-05-07 RX ORDER — POTASSIUM CHLORIDE 750 MG/1
10 TABLET, EXTENDED RELEASE ORAL DAILY
Qty: 90 TAB | Refills: 3 | Status: SHIPPED | OUTPATIENT
Start: 2019-05-07 | End: 2019-08-15

## 2019-05-07 RX ORDER — AMIODARONE HYDROCHLORIDE 200 MG/1
200 TABLET ORAL DAILY
Qty: 90 TAB | Refills: 3 | Status: SHIPPED | OUTPATIENT
Start: 2019-05-07 | End: 2019-08-15

## 2019-05-07 RX ORDER — METOPROLOL SUCCINATE 25 MG/1
25 TABLET, EXTENDED RELEASE ORAL DAILY
Qty: 90 TAB | Refills: 3 | Status: SHIPPED | OUTPATIENT
Start: 2019-05-07 | End: 2019-08-15

## 2019-05-07 RX ORDER — FUROSEMIDE 40 MG/1
40 TABLET ORAL DAILY
Qty: 90 TAB | Refills: 3 | Status: SHIPPED | OUTPATIENT
Start: 2019-05-07 | End: 2019-08-15

## 2019-05-07 RX ORDER — ROSUVASTATIN CALCIUM 40 MG/1
40 TABLET, COATED ORAL DAILY
Qty: 90 TAB | Refills: 3 | Status: SHIPPED | OUTPATIENT
Start: 2019-05-07 | End: 2019-08-15

## 2019-05-07 ASSESSMENT — ENCOUNTER SYMPTOMS
FEVER: 0
DIAPHORESIS: 0
HEADACHES: 0
MEMORY LOSS: 0
DOUBLE VISION: 0
BRUISES/BLEEDS EASILY: 0
DIZZINESS: 0
FALLS: 0
SHORTNESS OF BREATH: 1
SENSORY CHANGE: 0
PALPITATIONS: 0
COUGH: 0
MYALGIAS: 0
BLURRED VISION: 0
ABDOMINAL PAIN: 0
DEPRESSION: 0

## 2019-05-07 NOTE — PROGRESS NOTES
Chief Complaint   Patient presents with   • Congestive Heart Failure       Subjective:   Joshua Medrano is a 53 y.o. male who presents today for cardiac care and evaluation and management for ischemic cardiomyopathy. Patient does have a history of ischemic cardiomyopathy for which he underwent CABG x 2 (LIMA to LAD and SVG to OM) in December 2017. His left ventricular systolic function was found to be 25% but improved to 55% in 11/2018.     No dyspnea. No complaints. Feeling well overall. Does not want to be tested for SHASHANK.     04/2018 Patient was able to complete 146 m during his 6 minute walk test. his O2 saturation at baseline was 94% and at the end of the test, the O2 saturation was 99%. he reported 2 level of dyspnea on Elsa scale.    Still smoking.    Past Medical History:   Diagnosis Date   • ASTHMA    • Atrial fibrillation (HCC)    • CAD (coronary artery disease)    • Chronic obstructive pulmonary disease (HCC)    • Congestive heart failure (HCC)    • Diabetes      Past Surgical History:   Procedure Laterality Date   • COLONOSCOPY - ENDO N/A 7/25/2018    Procedure: COLONOSCOPY - ENDO;  Surgeon: Josiah Molina D.O.;  Location[facility] ENDOS[facility];  Service: Gastroenterology   • THORACOSCOPY Left 1/25/2018    Procedure: THORACOSCOPY- PLEURODESIS ;  Surgeon: Chandu Paez M.D.;  Location: SURGERY [facility];  Service: General   • MULTIPLE CORONARY ARTERY BYPASS ENDO VEIN HARVEST  12/22/2017    Procedure: MULTIPLE CORONARY ARTERY BYPASS ENDO VEIN HARVEST X2;  Surgeon: Kiel Ash M.D.;  Location: SURGERY [facility];  Service: Cardiothoracic   • JIM  12/22/2017    Procedure: JIM;  Surgeon: Kiel Ash M.D.;  Location: SURGERY [facility];  Service: Cardiothoracic   • OTHER ABDOMINAL SURGERY       Family History   Problem Relation Age of Onset   • Diabetes Mother    • Stroke Mother    • Leukemia Mother    • Diabetes Father    • No Known Problems Sister    • Heart Disease Brother          PPM   • Lung Disease Brother    • Alcohol/Drug Brother    • Diabetes Sister      Social History     Social History   • Marital status:      Spouse name: N/A   • Number of children: N/A   • Years of education: N/A     Occupational History   • Not on file.     Social History Main Topics   • Smoking status: Light Tobacco Smoker     Packs/day: 0.00     Years: 30.00     Types: Cigars   • Smokeless tobacco: Never Used      Comment: 1/2-1.5 packs for 30 years, currently smoking cigars no longer smoking cigarettes   • Alcohol use No   • Drug use: No   • Sexual activity: Not on file     Other Topics Concern   • Not on file     Social History Narrative   • No narrative on file     Allergies   Allergen Reactions   • Cillins [Penicillins] Anaphylaxis and Rash     As a child, tolerated cefepime (12/2017), ceftriaxone (1/2018), cefazolin (12/2017)   • Erythromycin Anaphylaxis     Rxn - 1994   • Metoprolol      Pt stated got latham and aggressive, in demi dose's per wife.      • Shellfish Allergy Rash     Pt stated that he is allergic to all seafood, will develop a rash and says that rash will clear up with benadryl     Outpatient Encounter Prescriptions as of 5/7/2019   Medication Sig Dispense Refill   • cephALEXin (KEFLEX) 500 MG Cap   0   • rosuvastatin (CRESTOR) 40 MG tablet Take 1 Tab by mouth every day. 90 Tab 3   • furosemide (LASIX) 40 MG Tab Take 1 Tab by mouth every day. 90 Tab 3   • metoprolol SR (TOPROL XL) 25 MG TABLET SR 24 HR Take 1 Tab by mouth every day. 90 Tab 3   • amiodarone (CORDARONE) 200 MG Tab Take 1 Tab by mouth every day. 90 Tab 3   • lisinopril (PRINIVIL) 2.5 MG Tab Take 1 Tab by mouth every day. 90 Tab 3   • spironolactone (ALDACTONE) 25 MG Tab Take 1 Tab by mouth every day. 90 Tab 3   • potassium chloride SA (K-DUR) 10 MEQ Tab CR Take 1 Tab by mouth every day. 90 Tab 3   • warfarin (COUMADIN) 5 MG Tab Take one and one-half tablets by mouth one time daily or as directed by coumadin clinic  135 Tab 1   • polyethylene glycol/lytes (MIRALAX) Pack Take 1 Packet by mouth 1 time daily as needed (for constipation). 7 Each 0   • albuterol 108 (90 Base) MCG/ACT Aero Soln inhalation aerosol Inhale 2 Puffs by mouth every 6 hours as needed for Shortness of Breath. 1 Inhaler 15   • ferrous sulfate 325 (65 Fe) MG tablet Take 1 Tab by mouth every morning with breakfast. 30 Tab 3   • acetaminophen (TYLENOL) 500 MG Tab Take 2 Tabs by mouth every 8 hours. 30 Tab 0   • tiotropium (SPIRIVA) 18 MCG Cap Inhale 1 Cap by mouth every day. 30 Cap 5   • ipratropium-albuterol (DUONEB) 0.5-2.5 (3) MG/3ML nebulizer solution 3 mL by Nebulization route every 6 hours as needed for Shortness of Breath. 180 Bullet 10   • [DISCONTINUED] spironolactone (ALDACTONE) 25 MG Tab Take 1 Tab by mouth every day. 90 Tab 3   • [DISCONTINUED] rosuvastatin (CRESTOR) 40 MG tablet Take 1 Tab by mouth every day. 90 Tab 3   • [DISCONTINUED] potassium chloride SA (K-DUR) 10 MEQ Tab CR Take 1 Tab by mouth every day. 90 Tab 3   • [DISCONTINUED] metoprolol SR (TOPROL XL) 25 MG TABLET SR 24 HR Take 1 Tab by mouth every day. 90 Tab 3   • [DISCONTINUED] lisinopril (PRINIVIL) 2.5 MG Tab Take 1 Tab by mouth every day. 90 Tab 3   • [DISCONTINUED] furosemide (LASIX) 40 MG Tab Take 1 Tab by mouth every day. 90 Tab 3   • [DISCONTINUED] amiodarone (CORDARONE) 200 MG Tab Take 1 Tab by mouth every day. 90 Tab 3     No facility-administered encounter medications on file as of 5/7/2019.      Review of Systems   Constitutional: Negative for diaphoresis and fever.   HENT: Negative for nosebleeds.    Eyes: Negative for blurred vision and double vision.   Respiratory: Positive for shortness of breath. Negative for cough.    Cardiovascular: Negative for chest pain and palpitations.   Gastrointestinal: Negative for abdominal pain.   Genitourinary: Negative for dysuria and frequency.   Musculoskeletal: Negative for falls and myalgias.   Skin: Negative for rash.   Neurological:  "Negative for dizziness, sensory change and headaches.   Endo/Heme/Allergies: Does not bruise/bleed easily.   Psychiatric/Behavioral: Negative for depression and memory loss.        Objective:   /60 (BP Location: Right arm, Patient Position: Sitting, BP Cuff Size: Adult)   Pulse 76   Ht 1.956 m (6' 5\")   SpO2 92%   BMI 37.18 kg/m²     Physical Exam   Constitutional: He is oriented to person, place, and time. No distress.   HENT:   Head: Normocephalic and atraumatic.   Right Ear: External ear normal.   Left Ear: External ear normal.   Eyes: Right eye exhibits no discharge. Left eye exhibits no discharge.   Neck: No JVD present. No thyromegaly present.   Cardiovascular: Normal rate, regular rhythm, normal heart sounds and intact distal pulses.  Exam reveals no gallop and no friction rub.    No murmur heard.  Pulmonary/Chest: Breath sounds normal. No respiratory distress.   Abdominal: Bowel sounds are normal. He exhibits no distension. There is no tenderness.   Musculoskeletal: He exhibits no edema or tenderness.   Neurological: He is alert and oriented to person, place, and time. No cranial nerve deficit.   Skin: Skin is warm and dry. He is not diaphoretic.   Psychiatric: He has a normal mood and affect. His behavior is normal.   Nursing note and vitals reviewed.      Assessment:     1. ACC/AHA stage C systolic heart failure (HCC)  furosemide (LASIX) 40 MG Tab    metoprolol SR (TOPROL XL) 25 MG TABLET SR 24 HR    lisinopril (PRINIVIL) 2.5 MG Tab    spironolactone (ALDACTONE) 25 MG Tab    potassium chloride SA (K-DUR) 10 MEQ Tab CR   2. Heart failure, NYHA class 2 (HCC)  furosemide (LASIX) 40 MG Tab    metoprolol SR (TOPROL XL) 25 MG TABLET SR 24 HR    lisinopril (PRINIVIL) 2.5 MG Tab    spironolactone (ALDACTONE) 25 MG Tab    potassium chloride SA (K-DUR) 10 MEQ Tab CR   3. Ischemic cardiomyopathy  rosuvastatin (CRESTOR) 40 MG tablet    furosemide (LASIX) 40 MG Tab    metoprolol SR (TOPROL XL) 25 MG TABLET SR " 24 HR    lisinopril (PRINIVIL) 2.5 MG Tab    spironolactone (ALDACTONE) 25 MG Tab    LIPID PANEL   4. Coronary artery disease involving native coronary artery of native heart without angina pectoris  rosuvastatin (CRESTOR) 40 MG tablet    Comp Metabolic Panel    LIPID PANEL   5. Mixed hyperlipidemia     6. HTN (hypertension), malignant  metoprolol SR (TOPROL XL) 25 MG TABLET SR 24 HR    lisinopril (PRINIVIL) 2.5 MG Tab    spironolactone (ALDACTONE) 25 MG Tab   7. Smoking     8. Dyspnea on exertion     9. High risk medication use  Comp Metabolic Panel    TSH+FREE T4    ZZZ CHEST X-RAY 2 VW   10. Dyslipidemia  rosuvastatin (CRESTOR) 40 MG tablet   11. Atrial fibrillation, controlled (HCC)  amiodarone (CORDARONE) 200 MG Tab       Medical Decision Making:  Today's Assessment / Status / Plan:   Today, based on physical examination findings, patient is euvolemic. No JVD, lungs are clear to auscultation, no pitting edema in bilateral lower extremities, no ascites.    Cont current medications at current dose as above.    No indication for ICD. LVEF is now 55%.    I spent 5 minutes talking to patient about the danger of smoking. I advised patient and counseled patient on smoking cessation. Patient has promised to achieve goal of zero cigarettes per day.      I will see patient back in clinic with lab tests and studies results in 6 months.    I thank you Dr. Le for referring patient to our Cardiology Clinic today.

## 2019-05-07 NOTE — PROGRESS NOTES
Anticoagulation Summary  As of 2019    INR goal:   2.0-3.0   TTR:   45.2 % (9.4 mo)   INR used for dosin.80 (2019)   Warfarin maintenance plan:   7.5 mg (5 mg x 1.5) every day   Weekly warfarin total:   52.5 mg   No change documented:   Suzy Long Med Ass't   Plan last modified:   Rachele San (2019)   Next INR check:   2019   Target end date:   Indefinite    Indications    Paroxysmal atrial fibrillation (HCC) [I48.0]  Atrial flutter with rapid ventricular response (HCC) (Resolved) [I48.92]             Anticoagulation Episode Summary     INR check location:       Preferred lab:       Send INR reminders to:       Comments:   Jia KIM      Anticoagulation Care Providers     Provider Role Specialty Phone number    Jett Bedoya M.D. Referring Cardiology [phone]    [facility] Anticoagulation Services Responsible  [phone]        Anticoagulation Patient Findings  Patient Findings     Negatives:   Signs/symptoms of thrombosis, Signs/symptoms of bleeding, Laboratory test error suspected, Change in health, Change in alcohol use, Change in activity, Upcoming invasive procedure, Emergency department visit, Upcoming dental procedure, Missed doses, Extra doses, Change in medications, Change in diet/appetite, Hospital admission, Bruising, Other complaints        Spoke with patient to report a therapeutic INR.  Pt instructed to continue with current warfarin dosing regimen. Pt denies any s/s of bleeding, bruising, clotting or any changes to diet or medication.  Will follow up in 2 weeks.    Suzy Long, Med Ass't    I have reviewed and am in agreement with the above stated plan on 19.  Johnathan Chen, PharmD

## 2019-05-07 NOTE — LETTER
[facility]   [address]  [place] [address]  Phone: [phone]  Fax: [phone]              Joshua Medrano  1965    Encounter Date: 5/7/2019    Jett Bedoya M.D.          PROGRESS NOTE:  Chief Complaint   Patient presents with   • Congestive Heart Failure       Subjective:   Joshua Medrano is a 53 y.o. male who presents today for cardiac care and evaluation and management for ischemic cardiomyopathy. Patient does have a history of ischemic cardiomyopathy for which he underwent CABG x 2 (LIMA to LAD and SVG to OM) in December 2017. His left ventricular systolic function was found to be 25% but improved to 55% in 11/2018.     No dyspnea. No complaints. Feeling well overall. Does not want to be tested for SHASHANK.     04/2018 Patient was able to complete 146 m during his 6 minute walk test. his O2 saturation at baseline was 94% and at the end of the test, the O2 saturation was 99%. he reported 2 level of dyspnea on Elsa scale.    Past Medical History:   Diagnosis Date   • ASTHMA    • Atrial fibrillation (HCC)    • CAD (coronary artery disease)    • Chronic obstructive pulmonary disease (HCC)    • Congestive heart failure (HCC)    • Diabetes      Past Surgical History:   Procedure Laterality Date   • COLONOSCOPY - ENDO N/A 7/25/2018    Procedure: COLONOSCOPY - ENDO;  Surgeon: Josiah Molina D.O.;  Location: ENDOSCOPY [facility];  Service: Gastroenterology   • THORACOSCOPY Left 1/25/2018    Procedure: THORACOSCOPY- PLEURODESIS ;  Surgeon: Chandu Paez M.D.;  Location: SURGERY [facility];  Service: General   • MULTIPLE CORONARY ARTERY BYPASS ENDO VEIN HARVEST  12/22/2017    Procedure: MULTIPLE CORONARY ARTERY BYPASS ENDO VEIN HARVEST X2;  Surgeon: Kiel Ash M.D.;  Location: SURGERY [facility];  Service: Cardiothoracic   • JIM  12/22/2017    Procedure: JIM;  Surgeon: Kiel Ash M.D.;  Location: [facility]  ORS;  Service: Cardiothoracic   • OTHER ABDOMINAL SURGERY       Family History   Problem Relation Age of Onset   • Diabetes Mother    • Stroke Mother    • Leukemia Mother    • Diabetes Father    • No Known Problems Sister    • Heart Disease Brother         PPM   • Lung Disease Brother    • Alcohol/Drug Brother    • Diabetes Sister      Social History     Social History   • Marital status:      Spouse name: N/A   • Number of children: N/A   • Years of education: N/A     Occupational History   • Not on file.     Social History Main Topics   • Smoking status: Light Tobacco Smoker     Packs/day: 0.00     Years: 30.00     Types: Cigars   • Smokeless tobacco: Never Used      Comment: 1/2-1.5 packs for 30 years, currently smoking cigars no longer smoking cigarettes   • Alcohol use No   • Drug use: No   • Sexual activity: Not on file     Other Topics Concern   • Not on file     Social History Narrative   • No narrative on file     Allergies   Allergen Reactions   • Cillins [Penicillins] Anaphylaxis and Rash     As a child, tolerated cefepime (12/2017), ceftriaxone (1/2018), cefazolin (12/2017)   • Erythromycin Anaphylaxis     Rxn - 1994   • Metoprolol      Pt stated got latham and aggressive, in demi dose's per wife.      • Shellfish Allergy Rash     Pt stated that he is allergic to all seafood, will develop a rash and says that rash will clear up with benadryl     Outpatient Encounter Prescriptions as of 5/7/2019   Medication Sig Dispense Refill   • cephALEXin (KEFLEX) 500 MG Cap   0   • rosuvastatin (CRESTOR) 40 MG tablet Take 1 Tab by mouth every day. 90 Tab 3   • furosemide (LASIX) 40 MG Tab Take 1 Tab by mouth every day. 90 Tab 3   • metoprolol SR (TOPROL XL) 25 MG TABLET SR 24 HR Take 1 Tab by mouth every day. 90 Tab 3   • amiodarone (CORDARONE) 200 MG Tab Take 1 Tab by mouth every day. 90 Tab 3   • lisinopril (PRINIVIL) 2.5 MG Tab Take 1 Tab by mouth every day. 90 Tab 3   • spironolactone (ALDACTONE) 25 MG Tab  Take 1 Tab by mouth every day. 90 Tab 3   • potassium chloride SA (K-DUR) 10 MEQ Tab CR Take 1 Tab by mouth every day. 90 Tab 3   • warfarin (COUMADIN) 5 MG Tab Take one and one-half tablets by mouth one time daily or as directed by coumadin clinic 135 Tab 1   • polyethylene glycol/lytes (MIRALAX) Pack Take 1 Packet by mouth 1 time daily as needed (for constipation). 7 Each 0   • albuterol 108 (90 Base) MCG/ACT Aero Soln inhalation aerosol Inhale 2 Puffs by mouth every 6 hours as needed for Shortness of Breath. 1 Inhaler 15   • ferrous sulfate 325 (65 Fe) MG tablet Take 1 Tab by mouth every morning with breakfast. 30 Tab 3   • acetaminophen (TYLENOL) 500 MG Tab Take 2 Tabs by mouth every 8 hours. 30 Tab 0   • tiotropium (SPIRIVA) 18 MCG Cap Inhale 1 Cap by mouth every day. 30 Cap 5   • ipratropium-albuterol (DUONEB) 0.5-2.5 (3) MG/3ML nebulizer solution 3 mL by Nebulization route every 6 hours as needed for Shortness of Breath. 180 Bullet 10   • [DISCONTINUED] spironolactone (ALDACTONE) 25 MG Tab Take 1 Tab by mouth every day. 90 Tab 3   • [DISCONTINUED] rosuvastatin (CRESTOR) 40 MG tablet Take 1 Tab by mouth every day. 90 Tab 3   • [DISCONTINUED] potassium chloride SA (K-DUR) 10 MEQ Tab CR Take 1 Tab by mouth every day. 90 Tab 3   • [DISCONTINUED] metoprolol SR (TOPROL XL) 25 MG TABLET SR 24 HR Take 1 Tab by mouth every day. 90 Tab 3   • [DISCONTINUED] lisinopril (PRINIVIL) 2.5 MG Tab Take 1 Tab by mouth every day. 90 Tab 3   • [DISCONTINUED] furosemide (LASIX) 40 MG Tab Take 1 Tab by mouth every day. 90 Tab 3   • [DISCONTINUED] amiodarone (CORDARONE) 200 MG Tab Take 1 Tab by mouth every day. 90 Tab 3     No facility-administered encounter medications on file as of 5/7/2019.      Review of Systems   Constitutional: Negative for diaphoresis and fever.   HENT: Negative for nosebleeds.    Eyes: Negative for blurred vision and double vision.   Respiratory: Positive for shortness of breath. Negative for cough.     "  Cardiovascular: Negative for chest pain and palpitations.   Gastrointestinal: Negative for abdominal pain.   Genitourinary: Negative for dysuria and frequency.   Musculoskeletal: Negative for falls and myalgias.   Skin: Negative for rash.   Neurological: Negative for dizziness, sensory change and headaches.   Endo/Heme/Allergies: Does not bruise/bleed easily.   Psychiatric/Behavioral: Negative for depression and memory loss.        Objective:   /60 (BP Location: Right arm, Patient Position: Sitting, BP Cuff Size: Adult)   Pulse 76   Ht 1.956 m (6' 5\")   SpO2 92%   BMI 37.18 kg/m²      Physical Exam   Constitutional: He is oriented to person, place, and time. No distress.   HENT:   Head: Normocephalic and atraumatic.   Right Ear: External ear normal.   Left Ear: External ear normal.   Eyes: Right eye exhibits no discharge. Left eye exhibits no discharge.   Neck: No JVD present. No thyromegaly present.   Cardiovascular: Normal rate, regular rhythm, normal heart sounds and intact distal pulses.  Exam reveals no gallop and no friction rub.    No murmur heard.  Pulmonary/Chest: Breath sounds normal. No respiratory distress.   Abdominal: Bowel sounds are normal. He exhibits no distension. There is no tenderness.   Musculoskeletal: He exhibits no edema or tenderness.   Neurological: He is alert and oriented to person, place, and time. No cranial nerve deficit.   Skin: Skin is warm and dry. He is not diaphoretic.   Psychiatric: He has a normal mood and affect. His behavior is normal.   Nursing note and vitals reviewed.      Assessment:     1. ACC/AHA stage C systolic heart failure (HCC)  furosemide (LASIX) 40 MG Tab    metoprolol SR (TOPROL XL) 25 MG TABLET SR 24 HR    lisinopril (PRINIVIL) 2.5 MG Tab    spironolactone (ALDACTONE) 25 MG Tab    potassium chloride SA (K-DUR) 10 MEQ Tab CR   2. Heart failure, NYHA class 2 (HCC)  furosemide (LASIX) 40 MG Tab    metoprolol SR (TOPROL XL) 25 MG TABLET SR 24 HR    " lisinopril (PRINIVIL) 2.5 MG Tab    spironolactone (ALDACTONE) 25 MG Tab    potassium chloride SA (K-DUR) 10 MEQ Tab CR   3. Ischemic cardiomyopathy  rosuvastatin (CRESTOR) 40 MG tablet    furosemide (LASIX) 40 MG Tab    metoprolol SR (TOPROL XL) 25 MG TABLET SR 24 HR    lisinopril (PRINIVIL) 2.5 MG Tab    spironolactone (ALDACTONE) 25 MG Tab    LIPID PANEL   4. Coronary artery disease involving native coronary artery of native heart without angina pectoris  rosuvastatin (CRESTOR) 40 MG tablet    Comp Metabolic Panel    LIPID PANEL   5. Mixed hyperlipidemia     6. HTN (hypertension), malignant  metoprolol SR (TOPROL XL) 25 MG TABLET SR 24 HR    lisinopril (PRINIVIL) 2.5 MG Tab    spironolactone (ALDACTONE) 25 MG Tab   7. Smoking     8. Dyspnea on exertion     9. High risk medication use  Comp Metabolic Panel    TSH+FREE T4    ZZZ CHEST X-RAY 2 VW   10. Dyslipidemia  rosuvastatin (CRESTOR) 40 MG tablet   11. Atrial fibrillation, controlled (HCC)  amiodarone (CORDARONE) 200 MG Tab       Medical Decision Making:  Today's Assessment / Status / Plan:   Today, based on physical examination findings, patient is euvolemic. No JVD, lungs are clear to auscultation, no pitting edema in bilateral lower extremities, no ascites.    Cont current medications at current dose as above.    No indication for ICD. LVEF is now 55%.    I will see patient back in clinic with lab tests and studies results in 6 months.    I thank you Dr. Le for referring patient to our Cardiology Clinic today.            Nam Le M.D.  [address] [address]  VIA In Basket

## 2019-05-17 ENCOUNTER — TELEPHONE (OUTPATIENT)
Dept: HEMATOLOGY ONCOLOGY | Facility: MEDICAL CENTER | Age: 54
End: 2019-05-17

## 2019-05-17 NOTE — TELEPHONE ENCOUNTER
Spoke to the the patient, informed them that we will be cancelling their NP appointment, as we are no longer accepting any new patient's, until further notice. They are to reach out to their referring provider, so they may be referred to another doctor.

## 2019-05-21 ENCOUNTER — ANTICOAGULATION MONITORING (OUTPATIENT)
Dept: VASCULAR LAB | Facility: MEDICAL CENTER | Age: 54
End: 2019-05-21

## 2019-05-21 DIAGNOSIS — I48.0 PAROXYSMAL ATRIAL FIBRILLATION (HCC): ICD-10-CM

## 2019-05-21 LAB — INR PPP: 3 (ref 2–3.5)

## 2019-05-21 NOTE — PROGRESS NOTES
Anticoagulation Summary  As of 5/21/2019    INR goal:   2.0-3.0   TTR:   47.8 % (9.8 mo)   INR used for dosing:   3.00 (5/21/2019)   Warfarin maintenance plan:   7.5 mg (5 mg x 1.5) every day   Weekly warfarin total:   52.5 mg   Plan last modified:   Rachele San (2/26/2019)   Next INR check:   6/4/2019   Target end date:   Indefinite    Indications    Paroxysmal atrial fibrillation (HCC) [I48.0]  Atrial flutter with rapid ventricular response (HCC) (Resolved) [I48.92]             Anticoagulation Episode Summary     INR check location:       Preferred lab:       Send INR reminders to:       Comments:   Jia KIM      Anticoagulation Care Providers     Provider Role Specialty Phone number    Jett Bedoya M.D. Referring Cardiology [phone]    [facility] Anticoagulation Services Responsible  [phone]        Anticoagulation Patient Findings  Patient Findings     Negatives:   Signs/symptoms of thrombosis, Signs/symptoms of bleeding, Laboratory test error suspected, Change in health, Change in alcohol use, Change in activity, Upcoming invasive procedure, Emergency department visit, Upcoming dental procedure, Missed doses, Extra doses, Change in medications, Change in diet/appetite, Hospital admission, Bruising, Other complaints        Spoke with the patient's wife on the phone today, reporting a therapeutic INR of 3.0.  Confirmed the current warfarin dosing regimen and patient compliance. Patient denies any interval changes to diet and/or medications. Patient denies any signs/symptoms of bleeding or clotting.  Patient instructed to continue with the current warfarin dosing regimen, and asked to follow up again in 2 weeks.    Shira CrespoD

## 2019-06-05 ENCOUNTER — ANTICOAGULATION MONITORING (OUTPATIENT)
Dept: VASCULAR LAB | Facility: MEDICAL CENTER | Age: 54
End: 2019-06-05

## 2019-06-05 DIAGNOSIS — I48.0 PAROXYSMAL ATRIAL FIBRILLATION (HCC): ICD-10-CM

## 2019-06-05 LAB — INR PPP: 5 (ref 2–3.5)

## 2019-06-05 NOTE — PROGRESS NOTES
Anticoagulation Summary  As of 2019    INR goal:   2.0-3.0   TTR:   45.5 % (10.3 mo)   INR used for dosin.00! (2019)   Warfarin maintenance plan:   7.5 mg (5 mg x 1.5) every day   Weekly warfarin total:   52.5 mg   Plan last modified:   Rachele San (2019)   Next INR check:   2019   Target end date:   Indefinite    Indications    Paroxysmal atrial fibrillation (HCC) [I48.0]  Atrial flutter with rapid ventricular response (HCC) (Resolved) [I48.92]             Anticoagulation Episode Summary     INR check location:       Preferred lab:       Send INR reminders to:       Comments:   Jia KIM      Anticoagulation Care Providers     Provider Role Specialty Phone number    Jett Bedoya M.D. Referring Cardiology [phone]    [facility] Anticoagulation Services Responsible  [phone]        Anticoagulation Patient Findings      Spoke with pt's wife.  INR is SUPRA therapeutic.   Pt denies any unusual s/s of bleeding, bruising, clotting or any changes to diet or medications. Denies any etoh, cranberries, supplements, or illness.   Pt verifies warfarin weekly dosing.     Will have pt hold his warfarin dose today and take a reduced dose of 5mg x1 tomorrow, then resume back to 7.5mg daily until INR in 1 week,     Repeat INR in 1 week(s).     Wandy Garzon, PharmD

## 2019-06-11 ENCOUNTER — ANTICOAGULATION MONITORING (OUTPATIENT)
Dept: MEDICAL GROUP | Facility: MEDICAL CENTER | Age: 54
End: 2019-06-11

## 2019-06-11 DIAGNOSIS — I48.0 PAROXYSMAL ATRIAL FIBRILLATION (HCC): ICD-10-CM

## 2019-06-11 LAB — INR PPP: 2 (ref 2–3.5)

## 2019-06-11 NOTE — PROGRESS NOTES
Anticoagulation Summary  As of 2019    INR goal:   2.0-3.0   TTR:   45.2 % (10.5 mo)   INR used for dosin.00 (2019)   Warfarin maintenance plan:   7.5 mg (5 mg x 1.5) every day   Weekly warfarin total:   52.5 mg   Plan last modified:   Rachele San (2019)   Next INR check:   2019   Target end date:   Indefinite    Indications    Paroxysmal atrial fibrillation (HCC) [I48.0]  Atrial flutter with rapid ventricular response (HCC) (Resolved) [I48.92]             Anticoagulation Episode Summary     INR check location:       Preferred lab:       Send INR reminders to:       Comments:   Jia KIM      Anticoagulation Care Providers     Provider Role Specialty Phone number    Jett Bedoya M.D. Referring Cardiology [phone]    [facility] Anticoagulation Services Responsible  [phone]        Anticoagulation Patient Findings          Spoke with Joshua to report a therapeutic INR of 2.0 Pt believes he had a short course of steroids, however he has completed these. Continue current dosing regimen.Follow up in 1 weeks, to reduce the risk of adverse events related to this high risk medication, warfarin.    Briana Roberson, Clinical Pharmacist

## 2019-06-13 ENCOUNTER — APPOINTMENT (RX ONLY)
Dept: URBAN - METROPOLITAN AREA CLINIC 36 | Facility: CLINIC | Age: 54
Setting detail: DERMATOLOGY
End: 2019-06-13

## 2019-06-13 DIAGNOSIS — Z48.817 ENCOUNTER FOR SURGICAL AFTERCARE FOLLOWING SURGERY ON THE SKIN AND SUBCUTANEOUS TISSUE: ICD-10-CM

## 2019-06-13 PROCEDURE — ? POST-OP WOUND CHECK

## 2019-06-13 PROCEDURE — 99024 POSTOP FOLLOW-UP VISIT: CPT

## 2019-06-13 ASSESSMENT — LOCATION ZONE DERM: LOCATION ZONE: ARM

## 2019-06-13 ASSESSMENT — LOCATION DETAILED DESCRIPTION DERM: LOCATION DETAILED: RIGHT POSTERIOR SHOULDER

## 2019-06-13 ASSESSMENT — LOCATION SIMPLE DESCRIPTION DERM: LOCATION SIMPLE: RIGHT SHOULDER

## 2019-06-13 NOTE — PROCEDURE: POST-OP WOUND CHECK
Body Location Override (Optional - Billing Will Still Be Based On Selected Body Map Location If Applicable): right shoulder
Add 21538 Cpt? (Important Note: In 2017 The Use Of 20490 Is Being Tracked By Cms To Determine Future Global Period Reimbursement For Global Periods): yes
Detail Level: Generalized

## 2019-06-25 ENCOUNTER — ANTICOAGULATION VISIT (OUTPATIENT)
Dept: VASCULAR LAB | Facility: MEDICAL CENTER | Age: 54
End: 2019-06-25
Attending: INTERNAL MEDICINE
Payer: MEDICARE

## 2019-06-25 VITALS — SYSTOLIC BLOOD PRESSURE: 101 MMHG | DIASTOLIC BLOOD PRESSURE: 61 MMHG | HEART RATE: 64 BPM

## 2019-06-25 DIAGNOSIS — I48.0 PAROXYSMAL ATRIAL FIBRILLATION (HCC): ICD-10-CM

## 2019-06-25 LAB
INR BLD: 1.4 (ref 0.9–1.2)
INR PPP: 1.4 (ref 2–3.5)

## 2019-06-25 PROCEDURE — 85610 PROTHROMBIN TIME: CPT

## 2019-06-25 PROCEDURE — 99212 OFFICE O/P EST SF 10 MIN: CPT

## 2019-06-25 NOTE — PROGRESS NOTES
Anticoagulation Summary  As of 6/25/2019    INR goal:   2.0-3.0   TTR:   45.2 % (10.5 mo)   INR used for dosing:      Plan last modified:   Rachele San (2/26/2019)   Next INR check:      Target end date:   Indefinite    Indications    Paroxysmal atrial fibrillation (HCC) [I48.0]  Atrial flutter with rapid ventricular response (HCC) (Resolved) [I48.92]             Anticoagulation Episode Summary     INR check location:       Preferred lab:       Send INR reminders to:       Comments:   Jia KIM      Anticoagulation Care Providers     Provider Role Specialty Phone number    Jett Bedoya M.D. Referring Cardiology [phone]    [facility] Anticoagulation Services Responsible  [phone]        Anticoagulation Patient Findings          History of Present Illness: follow up appointment for chronic anticoagulation with the high risk medication, warfarin for atrial fibrillation    Last INR was at goal, pt is now sub therapeutic today.  Will .  Follow up in 1 weeks, to reduce the risk of adverse events related to this high risk medication, warfarin.    Briana Roberson, Clinical Pharmacist

## 2019-07-02 ENCOUNTER — TELEPHONE (OUTPATIENT)
Dept: VASCULAR LAB | Facility: MEDICAL CENTER | Age: 54
End: 2019-07-02

## 2019-07-02 NOTE — TELEPHONE ENCOUNTER
Pt called to report that he is having difficulty with his home monitor.  He reports using 3+ strips today.  Recommend he come in with his meter for additional training.    Briana Roberson, Clinical Pharmacist, CDE, CACP

## 2019-07-10 ENCOUNTER — ANTICOAGULATION VISIT (OUTPATIENT)
Dept: VASCULAR LAB | Facility: MEDICAL CENTER | Age: 54
End: 2019-07-10
Attending: INTERNAL MEDICINE
Payer: MEDICARE

## 2019-07-10 ENCOUNTER — HOSPITAL ENCOUNTER (OUTPATIENT)
Dept: LAB | Facility: MEDICAL CENTER | Age: 54
End: 2019-07-10
Attending: NURSE PRACTITIONER
Payer: MEDICARE

## 2019-07-10 ENCOUNTER — HOSPITAL ENCOUNTER (OUTPATIENT)
Dept: LAB | Facility: MEDICAL CENTER | Age: 54
End: 2019-07-10
Attending: INTERNAL MEDICINE
Payer: MEDICARE

## 2019-07-10 DIAGNOSIS — I48.0 PAROXYSMAL ATRIAL FIBRILLATION (HCC): ICD-10-CM

## 2019-07-10 LAB
T4 FREE SERPL-MCNC: 1.5 NG/DL (ref 0.53–1.43)
TSH SERPL DL<=0.005 MIU/L-ACNC: 2.66 UIU/ML (ref 0.38–5.33)

## 2019-07-10 PROCEDURE — 84439 ASSAY OF FREE THYROXINE: CPT

## 2019-07-10 PROCEDURE — 84443 ASSAY THYROID STIM HORMONE: CPT

## 2019-07-10 PROCEDURE — 36415 COLL VENOUS BLD VENIPUNCTURE: CPT

## 2019-07-10 PROCEDURE — 85610 PROTHROMBIN TIME: CPT

## 2019-07-10 NOTE — PROGRESS NOTES
Pt presents to [facility] clinic today for POC INR. He has a home monitor but hasn't been able to get an INR due to errors with his machine.    I attempted INR x 3 using two of our clinic machines - all 3 results showed error. I checked pt's INR with home monitor which also showed error.    At this point, will have pt continue warfarin 7.5 mg daily and he will get a venipuncture today. Will call pt with result.      Taylor SHELTON

## 2019-07-15 ENCOUNTER — TELEPHONE (OUTPATIENT)
Dept: VASCULAR LAB | Facility: MEDICAL CENTER | Age: 54
End: 2019-07-15

## 2019-07-15 NOTE — TELEPHONE ENCOUNTER
Left VM for pt reminding him to get INR done at lab ASAP. Pt seen in clinic last week but unable to obtain POC INR due to error on clinic machine and home machine.    Taylor SHELTON

## 2019-07-29 ENCOUNTER — HOSPITAL ENCOUNTER (OUTPATIENT)
Dept: LAB | Facility: MEDICAL CENTER | Age: 54
End: 2019-07-29
Attending: NURSE PRACTITIONER
Payer: MEDICARE

## 2019-07-29 ENCOUNTER — HOSPITAL ENCOUNTER (OUTPATIENT)
Dept: LAB | Facility: MEDICAL CENTER | Age: 54
End: 2019-07-29
Attending: INTERNAL MEDICINE
Payer: MEDICARE

## 2019-07-29 DIAGNOSIS — I25.10 CORONARY ARTERY DISEASE INVOLVING NATIVE CORONARY ARTERY OF NATIVE HEART WITHOUT ANGINA PECTORIS: ICD-10-CM

## 2019-07-29 DIAGNOSIS — I48.0 PAROXYSMAL ATRIAL FIBRILLATION (HCC): ICD-10-CM

## 2019-07-29 DIAGNOSIS — Z79.899 HIGH RISK MEDICATION USE: ICD-10-CM

## 2019-07-29 LAB
ALBUMIN SERPL BCP-MCNC: 3.5 G/DL (ref 3.2–4.9)
ALBUMIN/GLOB SERPL: 0.9 G/DL
ALP SERPL-CCNC: 150 U/L (ref 30–99)
ALT SERPL-CCNC: 14 U/L (ref 2–50)
ANION GAP SERPL CALC-SCNC: 13 MMOL/L (ref 0–11.9)
AST SERPL-CCNC: 20 U/L (ref 12–45)
BILIRUB SERPL-MCNC: 1.3 MG/DL (ref 0.1–1.5)
BUN SERPL-MCNC: 22 MG/DL (ref 8–22)
CALCIUM SERPL-MCNC: 9.3 MG/DL (ref 8.5–10.5)
CHLORIDE SERPL-SCNC: 102 MMOL/L (ref 96–112)
CHOLEST SERPL-MCNC: 144 MG/DL (ref 100–199)
CO2 SERPL-SCNC: 20 MMOL/L (ref 20–33)
CREAT SERPL-MCNC: 1.08 MG/DL (ref 0.5–1.4)
GLOBULIN SER CALC-MCNC: 4.1 G/DL (ref 1.9–3.5)
GLUCOSE SERPL-MCNC: 101 MG/DL (ref 65–99)
HDLC SERPL-MCNC: 30 MG/DL
INR PPP: 3.61 (ref 0.87–1.13)
LDLC SERPL CALC-MCNC: 94 MG/DL
POTASSIUM SERPL-SCNC: 4.2 MMOL/L (ref 3.6–5.5)
PROT SERPL-MCNC: 7.6 G/DL (ref 6–8.2)
PROTHROMBIN TIME: 37.4 SEC (ref 12–14.6)
SODIUM SERPL-SCNC: 135 MMOL/L (ref 135–145)
TRIGL SERPL-MCNC: 100 MG/DL (ref 0–149)

## 2019-07-29 PROCEDURE — 80053 COMPREHEN METABOLIC PANEL: CPT

## 2019-07-29 PROCEDURE — 36415 COLL VENOUS BLD VENIPUNCTURE: CPT

## 2019-07-29 PROCEDURE — 85610 PROTHROMBIN TIME: CPT

## 2019-07-29 PROCEDURE — 80061 LIPID PANEL: CPT

## 2019-07-30 ENCOUNTER — ANTICOAGULATION MONITORING (OUTPATIENT)
Dept: VASCULAR LAB | Facility: MEDICAL CENTER | Age: 54
End: 2019-07-30

## 2019-07-30 DIAGNOSIS — I48.0 PAROXYSMAL ATRIAL FIBRILLATION (HCC): ICD-10-CM

## 2019-07-30 NOTE — PROGRESS NOTES
Anticoagulation Summary  As of 7/30/2019    INR goal:   2.0-3.0   TTR:   43.5 % (1 y)   INR used for dosing:   3.61! (7/29/2019)   Warfarin maintenance plan:   7.5 mg (5 mg x 1.5) every day   Weekly warfarin total:   52.5 mg   Plan last modified:   Rachele San (2/26/2019)   Next INR check:   8/13/2019   Target end date:   Indefinite    Indications    Paroxysmal atrial fibrillation (HCC) [I48.0]  Atrial flutter with rapid ventricular response (HCC) (Resolved) [I48.92]             Anticoagulation Episode Summary     INR check location:       Preferred lab:       Send INR reminders to:       Comments:   Jia KIM      Anticoagulation Care Providers     Provider Role Specialty Phone number    Jett Bedoya M.D. Referring Cardiology [phone]    [facility] Anticoagulation Services Responsible  [phone]        Anticoagulation Patient Findings    Spoke to patient on the phone.   INR  supra-therapeutic.   Denies signs/symptoms of bleeding and/or thrombosis.   Denies changes to diet or medications.   Follow up appointment in 2 week(s).    2.5mg today then continue weekly warfarin dose as noted      Rafi Chaudhary, PharmD

## 2019-08-14 ENCOUNTER — HOSPITAL ENCOUNTER (INPATIENT)
Facility: MEDICAL CENTER | Age: 54
LOS: 9 days | DRG: 605 | End: 2019-08-24
Attending: EMERGENCY MEDICINE | Admitting: INTERNAL MEDICINE
Payer: MEDICARE

## 2019-08-14 DIAGNOSIS — M25.062 HEMARTHROSIS INVOLVING KNEE JOINT, LEFT: ICD-10-CM

## 2019-08-14 DIAGNOSIS — M79.89 LEFT LEG SWELLING: ICD-10-CM

## 2019-08-14 DIAGNOSIS — S80.12XA TRAUMATIC HEMATOMA OF LEFT LOWER LEG, INITIAL ENCOUNTER: ICD-10-CM

## 2019-08-14 DIAGNOSIS — L08.9 TRAUMATIC HEMATOMA OF LOWER LEG WITH INFECTION, INITIAL ENCOUNTER: ICD-10-CM

## 2019-08-14 DIAGNOSIS — R53.81 PHYSICAL DECONDITIONING: ICD-10-CM

## 2019-08-14 DIAGNOSIS — S80.10XA TRAUMATIC HEMATOMA OF LOWER LEG WITH INFECTION, INITIAL ENCOUNTER: ICD-10-CM

## 2019-08-14 PROCEDURE — 99285 EMERGENCY DEPT VISIT HI MDM: CPT

## 2019-08-14 ASSESSMENT — PAIN DESCRIPTION - DESCRIPTORS: DESCRIPTORS: ACHING

## 2019-08-15 ENCOUNTER — APPOINTMENT (OUTPATIENT)
Dept: RADIOLOGY | Facility: MEDICAL CENTER | Age: 54
DRG: 605 | End: 2019-08-15
Attending: EMERGENCY MEDICINE
Payer: MEDICARE

## 2019-08-15 PROBLEM — S80.12XA TRAUMATIC HEMATOMA OF LEFT LOWER LEG: Status: ACTIVE | Noted: 2019-08-15

## 2019-08-15 PROBLEM — L03.90 CELLULITIS: Status: ACTIVE | Noted: 2019-08-15

## 2019-08-15 LAB
ALBUMIN SERPL BCP-MCNC: 3.7 G/DL (ref 3.2–4.9)
ALBUMIN/GLOB SERPL: 0.7 G/DL
ALP SERPL-CCNC: 150 U/L (ref 30–99)
ALT SERPL-CCNC: 13 U/L (ref 2–50)
ANION GAP SERPL CALC-SCNC: 11 MMOL/L (ref 0–11.9)
ANISOCYTOSIS BLD QL SMEAR: ABNORMAL
APTT PPP: 60.6 SEC (ref 24.7–36)
AST SERPL-CCNC: 18 U/L (ref 12–45)
BARCODED ABORH UBTYP: 6200
BARCODED PRD CODE UBPRD: NORMAL
BARCODED UNIT NUM UBUNT: NORMAL
BASOPHILS # BLD AUTO: 1.6 % (ref 0–1.8)
BASOPHILS # BLD: 0.4 K/UL (ref 0–0.12)
BILIRUB SERPL-MCNC: 0.8 MG/DL (ref 0.1–1.5)
BUN SERPL-MCNC: 26 MG/DL (ref 8–22)
CALCIUM SERPL-MCNC: 10.2 MG/DL (ref 8.5–10.5)
CHLORIDE SERPL-SCNC: 98 MMOL/L (ref 96–112)
CO2 SERPL-SCNC: 25 MMOL/L (ref 20–33)
COMMENT 1642: NORMAL
COMPONENT FT 8504FT: NORMAL
CREAT SERPL-MCNC: 1.08 MG/DL (ref 0.5–1.4)
EOSINOPHIL # BLD AUTO: 0.63 K/UL (ref 0–0.51)
EOSINOPHIL NFR BLD: 2.5 % (ref 0–6.9)
ERYTHROCYTE [DISTWIDTH] IN BLOOD BY AUTOMATED COUNT: 76.8 FL (ref 35.9–50)
GLOBULIN SER CALC-MCNC: 5 G/DL (ref 1.9–3.5)
GLUCOSE SERPL-MCNC: 151 MG/DL (ref 65–99)
HCT VFR BLD AUTO: 53.1 % (ref 42–52)
HCT VFR BLD AUTO: 55.3 % (ref 42–52)
HCT VFR BLD AUTO: 59.2 % (ref 42–52)
HGB BLD-MCNC: 16.5 G/DL (ref 14–18)
HGB BLD-MCNC: 17.3 G/DL (ref 14–18)
HGB BLD-MCNC: 18.5 G/DL (ref 14–18)
HYPOCHROMIA BLD QL SMEAR: ABNORMAL
IMM GRANULOCYTES # BLD AUTO: 0.33 K/UL (ref 0–0.11)
IMM GRANULOCYTES NFR BLD AUTO: 1.3 % (ref 0–0.9)
INR PPP: 4.55 (ref 0.87–1.13)
LACTATE BLD-SCNC: 1.5 MMOL/L (ref 0.5–2)
LYMPHOCYTES # BLD AUTO: 0.78 K/UL (ref 1–4.8)
LYMPHOCYTES NFR BLD: 3 % (ref 22–41)
MCH RBC QN AUTO: 24.8 PG (ref 27–33)
MCHC RBC AUTO-ENTMCNC: 31.3 G/DL (ref 33.7–35.3)
MCV RBC AUTO: 79.4 FL (ref 81.4–97.8)
MICROCYTES BLD QL SMEAR: ABNORMAL
MONOCYTES # BLD AUTO: 1.63 K/UL (ref 0–0.85)
MONOCYTES NFR BLD AUTO: 6.3 % (ref 0–13.4)
MORPHOLOGY BLD-IMP: NORMAL
NEUTROPHILS # BLD AUTO: 21.92 K/UL (ref 1.82–7.42)
NEUTROPHILS NFR BLD: 85.3 % (ref 44–72)
NRBC # BLD AUTO: 0 K/UL
NRBC BLD-RTO: 0 /100 WBC
PLATELET # BLD AUTO: 296 K/UL (ref 164–446)
PLATELET BLD QL SMEAR: NORMAL
PMV BLD AUTO: 9.9 FL (ref 9–12.9)
POTASSIUM SERPL-SCNC: 4.3 MMOL/L (ref 3.6–5.5)
PRODUCT TYPE UPROD: NORMAL
PROT SERPL-MCNC: 8.7 G/DL (ref 6–8.2)
PROTHROMBIN TIME: 45 SEC (ref 12–14.6)
RBC # BLD AUTO: 7.46 M/UL (ref 4.7–6.1)
RBC BLD AUTO: PRESENT
SODIUM SERPL-SCNC: 134 MMOL/L (ref 135–145)
UNIT STATUS USTAT: NORMAL
WBC # BLD AUTO: 25.7 K/UL (ref 4.8–10.8)

## 2019-08-15 PROCEDURE — 700102 HCHG RX REV CODE 250 W/ 637 OVERRIDE(OP): Performed by: STUDENT IN AN ORGANIZED HEALTH CARE EDUCATION/TRAINING PROGRAM

## 2019-08-15 PROCEDURE — 700102 HCHG RX REV CODE 250 W/ 637 OVERRIDE(OP): Performed by: INTERNAL MEDICINE

## 2019-08-15 PROCEDURE — 700117 HCHG RX CONTRAST REV CODE 255: Performed by: EMERGENCY MEDICINE

## 2019-08-15 PROCEDURE — 85014 HEMATOCRIT: CPT

## 2019-08-15 PROCEDURE — 85730 THROMBOPLASTIN TIME PARTIAL: CPT

## 2019-08-15 PROCEDURE — 96367 TX/PROPH/DG ADDL SEQ IV INF: CPT

## 2019-08-15 PROCEDURE — 700111 HCHG RX REV CODE 636 W/ 250 OVERRIDE (IP): Performed by: EMERGENCY MEDICINE

## 2019-08-15 PROCEDURE — 700105 HCHG RX REV CODE 258: Performed by: EMERGENCY MEDICINE

## 2019-08-15 PROCEDURE — 36415 COLL VENOUS BLD VENIPUNCTURE: CPT

## 2019-08-15 PROCEDURE — 96375 TX/PRO/DX INJ NEW DRUG ADDON: CPT

## 2019-08-15 PROCEDURE — 36430 TRANSFUSION BLD/BLD COMPNT: CPT

## 2019-08-15 PROCEDURE — 94640 AIRWAY INHALATION TREATMENT: CPT

## 2019-08-15 PROCEDURE — 85018 HEMOGLOBIN: CPT

## 2019-08-15 PROCEDURE — 80053 COMPREHEN METABOLIC PANEL: CPT | Mod: 91

## 2019-08-15 PROCEDURE — 700101 HCHG RX REV CODE 250: Performed by: EMERGENCY MEDICINE

## 2019-08-15 PROCEDURE — A9270 NON-COVERED ITEM OR SERVICE: HCPCS | Performed by: INTERNAL MEDICINE

## 2019-08-15 PROCEDURE — 83605 ASSAY OF LACTIC ACID: CPT

## 2019-08-15 PROCEDURE — A9270 NON-COVERED ITEM OR SERVICE: HCPCS | Performed by: STUDENT IN AN ORGANIZED HEALTH CARE EDUCATION/TRAINING PROGRAM

## 2019-08-15 PROCEDURE — 85025 COMPLETE CBC W/AUTO DIFF WBC: CPT | Mod: 91

## 2019-08-15 PROCEDURE — P9017 PLASMA 1 DONOR FRZ W/IN 8 HR: HCPCS

## 2019-08-15 PROCEDURE — 700105 HCHG RX REV CODE 258: Performed by: INTERNAL MEDICINE

## 2019-08-15 PROCEDURE — 87040 BLOOD CULTURE FOR BACTERIA: CPT | Mod: 91

## 2019-08-15 PROCEDURE — 770006 HCHG ROOM/CARE - MED/SURG/GYN SEMI*

## 2019-08-15 PROCEDURE — 96365 THER/PROPH/DIAG IV INF INIT: CPT

## 2019-08-15 PROCEDURE — 96376 TX/PRO/DX INJ SAME DRUG ADON: CPT

## 2019-08-15 PROCEDURE — 700101 HCHG RX REV CODE 250: Performed by: INTERNAL MEDICINE

## 2019-08-15 PROCEDURE — 30233K1 TRANSFUSION OF NONAUTOLOGOUS FROZEN PLASMA INTO PERIPHERAL VEIN, PERCUTANEOUS APPROACH: ICD-10-PCS | Performed by: INTERNAL MEDICINE

## 2019-08-15 PROCEDURE — 85610 PROTHROMBIN TIME: CPT | Mod: 91

## 2019-08-15 PROCEDURE — 700111 HCHG RX REV CODE 636 W/ 250 OVERRIDE (IP): Performed by: INTERNAL MEDICINE

## 2019-08-15 PROCEDURE — 99221 1ST HOSP IP/OBS SF/LOW 40: CPT | Mod: AI,GC | Performed by: INTERNAL MEDICINE

## 2019-08-15 PROCEDURE — 82977 ASSAY OF GGT: CPT

## 2019-08-15 PROCEDURE — 73562 X-RAY EXAM OF KNEE 3: CPT | Mod: LT

## 2019-08-15 PROCEDURE — 73706 CT ANGIO LWR EXTR W/O&W/DYE: CPT | Mod: LT

## 2019-08-15 RX ORDER — ALBUTEROL SULFATE 90 UG/1
2 AEROSOL, METERED RESPIRATORY (INHALATION) EVERY 6 HOURS PRN
Status: DISCONTINUED | OUTPATIENT
Start: 2019-08-15 | End: 2019-08-15

## 2019-08-15 RX ORDER — METOPROLOL SUCCINATE 25 MG/1
25 TABLET, EXTENDED RELEASE ORAL DAILY
Status: DISCONTINUED | OUTPATIENT
Start: 2019-08-15 | End: 2019-08-24 | Stop reason: HOSPADM

## 2019-08-15 RX ORDER — TIOTROPIUM BROMIDE 18 UG/1
18 CAPSULE ORAL; RESPIRATORY (INHALATION) EVERY MORNING
COMMUNITY

## 2019-08-15 RX ORDER — ACETAMINOPHEN 325 MG/1
650 TABLET ORAL EVERY 6 HOURS
Status: DISCONTINUED | OUTPATIENT
Start: 2019-08-15 | End: 2019-08-15

## 2019-08-15 RX ORDER — FUROSEMIDE 40 MG/1
40 TABLET ORAL EVERY MORNING
COMMUNITY
End: 2019-11-11 | Stop reason: SDUPTHER

## 2019-08-15 RX ORDER — ACETAMINOPHEN 500 MG
1000 TABLET ORAL EVERY 8 HOURS PRN
Status: ON HOLD | COMMUNITY
End: 2019-09-13

## 2019-08-15 RX ORDER — POLYETHYLENE GLYCOL 3350 17 G/17G
1 POWDER, FOR SOLUTION ORAL
Status: DISCONTINUED | OUTPATIENT
Start: 2019-08-15 | End: 2019-08-24 | Stop reason: HOSPADM

## 2019-08-15 RX ORDER — MORPHINE SULFATE 4 MG/ML
4 INJECTION, SOLUTION INTRAMUSCULAR; INTRAVENOUS ONCE
Status: COMPLETED | OUTPATIENT
Start: 2019-08-15 | End: 2019-08-15

## 2019-08-15 RX ORDER — FERROUS SULFATE 325(65) MG
325 TABLET ORAL EVERY MORNING
COMMUNITY

## 2019-08-15 RX ORDER — OXYCODONE HYDROCHLORIDE 5 MG/1
5-10 TABLET ORAL EVERY 4 HOURS PRN
Status: DISCONTINUED | OUTPATIENT
Start: 2019-08-15 | End: 2019-08-24 | Stop reason: HOSPADM

## 2019-08-15 RX ORDER — ALBUTEROL SULFATE 90 UG/1
2 AEROSOL, METERED RESPIRATORY (INHALATION) EVERY 6 HOURS PRN
Status: ON HOLD | COMMUNITY
End: 2020-12-08

## 2019-08-15 RX ORDER — PHYTONADIONE 2 MG/ML
1 INJECTION, EMULSION INTRAMUSCULAR; INTRAVENOUS; SUBCUTANEOUS ONCE
Status: CANCELLED | OUTPATIENT
Start: 2019-08-15 | End: 2019-08-15

## 2019-08-15 RX ORDER — BISACODYL 10 MG
10 SUPPOSITORY, RECTAL RECTAL
Status: DISCONTINUED | OUTPATIENT
Start: 2019-08-15 | End: 2019-08-24 | Stop reason: HOSPADM

## 2019-08-15 RX ORDER — MORPHINE SULFATE 4 MG/ML
2-4 INJECTION, SOLUTION INTRAMUSCULAR; INTRAVENOUS EVERY 4 HOURS PRN
Status: DISCONTINUED | OUTPATIENT
Start: 2019-08-15 | End: 2019-08-20

## 2019-08-15 RX ORDER — OXYCODONE HYDROCHLORIDE 5 MG/1
5 TABLET ORAL EVERY 4 HOURS PRN
Status: DISCONTINUED | OUTPATIENT
Start: 2019-08-15 | End: 2019-08-15

## 2019-08-15 RX ORDER — ROSUVASTATIN CALCIUM 20 MG/1
40 TABLET, COATED ORAL DAILY
Status: DISCONTINUED | OUTPATIENT
Start: 2019-08-15 | End: 2019-08-24 | Stop reason: HOSPADM

## 2019-08-15 RX ORDER — FUROSEMIDE 40 MG/1
40 TABLET ORAL DAILY
Status: DISCONTINUED | OUTPATIENT
Start: 2019-08-15 | End: 2019-08-24 | Stop reason: HOSPADM

## 2019-08-15 RX ORDER — IPRATROPIUM BROMIDE AND ALBUTEROL SULFATE 2.5; .5 MG/3ML; MG/3ML
3 SOLUTION RESPIRATORY (INHALATION) EVERY 6 HOURS PRN
Status: DISCONTINUED | OUTPATIENT
Start: 2019-08-15 | End: 2019-08-15

## 2019-08-15 RX ORDER — AMIODARONE HYDROCHLORIDE 200 MG/1
200 TABLET ORAL DAILY
Status: DISCONTINUED | OUTPATIENT
Start: 2019-08-15 | End: 2019-08-24 | Stop reason: HOSPADM

## 2019-08-15 RX ORDER — POTASSIUM CHLORIDE 20 MEQ/1
10 TABLET, EXTENDED RELEASE ORAL DAILY
Status: DISCONTINUED | OUTPATIENT
Start: 2019-08-15 | End: 2019-08-24 | Stop reason: HOSPADM

## 2019-08-15 RX ORDER — TIOTROPIUM BROMIDE 18 UG/1
1 CAPSULE ORAL; RESPIRATORY (INHALATION) DAILY
Status: DISCONTINUED | OUTPATIENT
Start: 2019-08-15 | End: 2019-08-24 | Stop reason: HOSPADM

## 2019-08-15 RX ORDER — ALBUTEROL SULFATE 90 UG/1
2 AEROSOL, METERED RESPIRATORY (INHALATION) EVERY 4 HOURS PRN
Status: DISCONTINUED | OUTPATIENT
Start: 2019-08-15 | End: 2019-08-19

## 2019-08-15 RX ORDER — AMOXICILLIN 250 MG
2 CAPSULE ORAL 2 TIMES DAILY
Status: DISCONTINUED | OUTPATIENT
Start: 2019-08-15 | End: 2019-08-24 | Stop reason: HOSPADM

## 2019-08-15 RX ORDER — FERROUS SULFATE 325(65) MG
325 TABLET ORAL
Status: DISCONTINUED | OUTPATIENT
Start: 2019-08-15 | End: 2019-08-24 | Stop reason: HOSPADM

## 2019-08-15 RX ORDER — LISINOPRIL 5 MG/1
2.5 TABLET ORAL DAILY
Status: DISCONTINUED | OUTPATIENT
Start: 2019-08-15 | End: 2019-08-24 | Stop reason: HOSPADM

## 2019-08-15 RX ORDER — POLYETHYLENE GLYCOL 3350 17 G/17G
17 POWDER, FOR SOLUTION ORAL EVERY MORNING
Status: ON HOLD | COMMUNITY
End: 2020-11-24

## 2019-08-15 RX ORDER — WARFARIN SODIUM 5 MG/1
7.5 TABLET ORAL EVERY MORNING
COMMUNITY
End: 2019-11-18

## 2019-08-15 RX ORDER — IPRATROPIUM BROMIDE AND ALBUTEROL SULFATE 2.5; .5 MG/3ML; MG/3ML
3 SOLUTION RESPIRATORY (INHALATION)
Status: DISCONTINUED | OUTPATIENT
Start: 2019-08-15 | End: 2019-08-24 | Stop reason: HOSPADM

## 2019-08-15 RX ORDER — SPIRONOLACTONE 25 MG/1
25 TABLET ORAL DAILY
Status: DISCONTINUED | OUTPATIENT
Start: 2019-08-15 | End: 2019-08-24 | Stop reason: HOSPADM

## 2019-08-15 RX ORDER — METOPROLOL SUCCINATE 25 MG/1
25 TABLET, EXTENDED RELEASE ORAL EVERY MORNING
COMMUNITY
End: 2019-11-11 | Stop reason: SDUPTHER

## 2019-08-15 RX ORDER — ACETAMINOPHEN 500 MG
1000 TABLET ORAL EVERY 8 HOURS PRN
Status: DISCONTINUED | OUTPATIENT
Start: 2019-08-15 | End: 2019-08-24 | Stop reason: HOSPADM

## 2019-08-15 RX ORDER — SODIUM CHLORIDE 9 MG/ML
INJECTION, SOLUTION INTRAVENOUS CONTINUOUS
Status: ACTIVE | OUTPATIENT
Start: 2019-08-15 | End: 2019-08-16

## 2019-08-15 RX ORDER — ROSUVASTATIN CALCIUM 40 MG/1
40 TABLET, COATED ORAL EVERY MORNING
COMMUNITY
End: 2019-11-11 | Stop reason: SDUPTHER

## 2019-08-15 RX ORDER — SPIRONOLACTONE 25 MG/1
25 TABLET ORAL EVERY MORNING
COMMUNITY
End: 2019-11-11 | Stop reason: SDUPTHER

## 2019-08-15 RX ORDER — POTASSIUM CHLORIDE 750 MG/1
10 TABLET, EXTENDED RELEASE ORAL EVERY MORNING
COMMUNITY
End: 2019-11-11 | Stop reason: SDUPTHER

## 2019-08-15 RX ORDER — LISINOPRIL 2.5 MG/1
2.5 TABLET ORAL EVERY MORNING
COMMUNITY
End: 2019-11-11 | Stop reason: SDUPTHER

## 2019-08-15 RX ORDER — AMIODARONE HYDROCHLORIDE 200 MG/1
200 TABLET ORAL EVERY MORNING
COMMUNITY
End: 2019-11-11 | Stop reason: SDUPTHER

## 2019-08-15 RX ADMIN — SENNOSIDES, DOCUSATE SODIUM 2 TABLET: 50; 8.6 TABLET, FILM COATED ORAL at 08:57

## 2019-08-15 RX ADMIN — IPRATROPIUM BROMIDE 0.5 MG: 0.5 SOLUTION RESPIRATORY (INHALATION) at 00:18

## 2019-08-15 RX ADMIN — OXYCODONE HYDROCHLORIDE 5 MG: 5 TABLET ORAL at 08:57

## 2019-08-15 RX ADMIN — ROSUVASTATIN CALCIUM 40 MG: 20 TABLET, FILM COATED ORAL at 08:56

## 2019-08-15 RX ADMIN — CEFTRIAXONE SODIUM 2 G: 2 INJECTION, POWDER, FOR SOLUTION INTRAMUSCULAR; INTRAVENOUS at 04:50

## 2019-08-15 RX ADMIN — OXYCODONE HYDROCHLORIDE 10 MG: 5 TABLET ORAL at 18:31

## 2019-08-15 RX ADMIN — IPRATROPIUM BROMIDE AND ALBUTEROL SULFATE 3 ML: .5; 3 SOLUTION RESPIRATORY (INHALATION) at 21:18

## 2019-08-15 RX ADMIN — TIOTROPIUM BROMIDE 1 CAPSULE: 18 CAPSULE ORAL; RESPIRATORY (INHALATION) at 11:18

## 2019-08-15 RX ADMIN — METOPROLOL SUCCINATE 25 MG: 25 TABLET, EXTENDED RELEASE ORAL at 08:56

## 2019-08-15 RX ADMIN — PHYTONADIONE 10 MG: 10 INJECTION, EMULSION INTRAMUSCULAR; INTRAVENOUS; SUBCUTANEOUS at 05:20

## 2019-08-15 RX ADMIN — POTASSIUM CHLORIDE 10 MEQ: 20 TABLET, EXTENDED RELEASE ORAL at 08:56

## 2019-08-15 RX ADMIN — MORPHINE SULFATE 4 MG: 4 INJECTION INTRAVENOUS at 02:51

## 2019-08-15 RX ADMIN — AMIODARONE HYDROCHLORIDE 200 MG: 200 TABLET ORAL at 08:57

## 2019-08-15 RX ADMIN — IOHEXOL 100 ML: 350 INJECTION, SOLUTION INTRAVENOUS at 02:46

## 2019-08-15 RX ADMIN — VANCOMYCIN HYDROCHLORIDE 3 G: 500 INJECTION, POWDER, LYOPHILIZED, FOR SOLUTION INTRAVENOUS at 21:01

## 2019-08-15 RX ADMIN — LISINOPRIL 2.5 MG: 2.5 TABLET ORAL at 11:31

## 2019-08-15 RX ADMIN — FUROSEMIDE 40 MG: 40 TABLET ORAL at 08:57

## 2019-08-15 RX ADMIN — MORPHINE SULFATE 4 MG: 4 INJECTION INTRAVENOUS at 01:03

## 2019-08-15 RX ADMIN — SPIRONOLACTONE 25 MG: 25 TABLET ORAL at 08:57

## 2019-08-15 RX ADMIN — FERROUS SULFATE TAB 325 MG (65 MG ELEMENTAL FE) 325 MG: 325 (65 FE) TAB at 08:58

## 2019-08-15 RX ADMIN — ALBUTEROL SULFATE 2.5 MG: 2.5 SOLUTION RESPIRATORY (INHALATION) at 00:18

## 2019-08-15 ASSESSMENT — ENCOUNTER SYMPTOMS
HEMOPTYSIS: 0
BACK PAIN: 0
CLAUDICATION: 0
NEUROLOGICAL NEGATIVE: 1
GASTROINTESTINAL NEGATIVE: 1
NAUSEA: 0
SPUTUM PRODUCTION: 0
PALPITATIONS: 0
NECK PAIN: 0
HEARTBURN: 0
FEVER: 0
BRUISES/BLEEDS EASILY: 1
EYES NEGATIVE: 1
FEVER: 1
WEIGHT LOSS: 0
COUGH: 0
SHORTNESS OF BREATH: 0
PSYCHIATRIC NEGATIVE: 1
MYALGIAS: 0
VOMITING: 0
BLOOD IN STOOL: 0
CONSTIPATION: 0
ORTHOPNEA: 0
CHILLS: 0
DIARRHEA: 0
ABDOMINAL PAIN: 0
WHEEZING: 0

## 2019-08-15 ASSESSMENT — LIFESTYLE VARIABLES
HAVE PEOPLE ANNOYED YOU BY CRITICIZING YOUR DRINKING: NO
EVER_SMOKED: YES
CONSUMPTION TOTAL: INCOMPLETE
TOTAL SCORE: 0
TOTAL SCORE: 0
EVER_SMOKED: YES
ALCOHOL_USE: NO
TOTAL SCORE: 0
EVER HAD A DRINK FIRST THING IN THE MORNING TO STEADY YOUR NERVES TO GET RID OF A HANGOVER: NO
HAVE YOU EVER FELT YOU SHOULD CUT DOWN ON YOUR DRINKING: NO
EVER FELT BAD OR GUILTY ABOUT YOUR DRINKING: NO

## 2019-08-15 ASSESSMENT — COPD QUESTIONNAIRES
DURING THE PAST 4 WEEKS HOW MUCH DID YOU FEEL SHORT OF BREATH: NONE/LITTLE OF THE TIME
DO YOU EVER COUGH UP ANY MUCUS OR PHLEGM?: YES, A FEW DAYS A WEEK OR MONTH
HAVE YOU SMOKED AT LEAST 100 CIGARETTES IN YOUR ENTIRE LIFE: YES
COPD SCREENING SCORE: 4

## 2019-08-15 NOTE — ASSESSMENT & PLAN NOTE
HF is chronic and not acutely decompensated. Stable. Last EF is 55%, pt has mild concentric left ventricular hypertrophy, enlarged right atrium and severely dilated right ventricle. Extremities with strong pulse and well perfused.    Plan  Continue lisinopril, furosemide, metoprolol, rosuvastatin, spironolactone

## 2019-08-15 NOTE — ASSESSMENT & PLAN NOTE
Questionable diagnosis of cellulitis earlier this admission, determined to not be infection. WBC ct is 25.7, on exam pt's leg looked erythematous and tender to touch, he was started on ceftriaxone in the ED, which was discontinued on 8/15/19. That evening blood cultures with gram positive rods on gram stain. Initially started on vancomycin, but result thought to be a contaminant. Abx discontinued.     Plan  Monitor for improvement or signs of recurrent bleeding with restarting anticoagulation

## 2019-08-15 NOTE — ED TRIAGE NOTES
"Chief Complaint   Patient presents with   • Leg Pain     L leg pain. pt hit his knee 5 days ago on the cabinet and now says he cant walk or move it.      Pt wheeled to triage for above complaint. Pt states the pain started about 2 days after he hit his knee. Pt can wiggle toes and CMS is intact.    /69   Pulse 88   Temp 36.1 °C (97 °F) (Temporal)   Resp 16   Ht 1.956 m (6' 5\")   Wt (!) 136.5 kg (301 lb)   SpO2 96%   BMI 35.69 kg/m²     "

## 2019-08-15 NOTE — H&P
Internal Medicine Admitting History and Physical    Note Author: Teo Del Valle M.D.       Name Joshua Medrano     1965   Age/Sex 53 y.o. male   MRN [id number]   Code Status Full Code     After 5PM or if no immediate response to page, please call for cross-coverage  Attending/Team: Dr. Mccurdy/Juan See Patient List for primary contact information  Call [phone] to page    1st Call - Day Intern (R1):   Dr. Espana  2nd Call - Day Sr. Resident (R2/R3):   Dr Mtz       Chief Complaint:   Left leg hematoma possible compartment syndrome    HPI:  Mr. Medrano is a 52 y/o M w/ hx of afib on warfarin, HFpEF of EF50%, COPD, SHASHANK, morbid obesity who was admitted for Left leg hematoma c/o compartment syndrome.    5 days ago pt was walking to the bathroom and slid on a wet surface causing him to hit his left lower leg. Then 3 days ago, pt noticed his left leg became swollen and starting to hurt. Then afterwards pt cannot put weight on his left leg and became extremely painful. Pt was not aware about having a rash. Pt denies any chest pain, heart palpitations. Denies chills and fever.     At the ED pt VS T36.1, P77, /70 and O2 sat at 93%. Pt WBC is 25.7, Na of 134. INR 4.55. CT-A of lower leg showed hematoma on the right lower leg posterior compartment c/o risk of development of compartment syndrome. Left leg has reconstitution of three-vessel runoff of the left ankle. No acute traumatic bone injury.     Pt has signficant past medical hx of CABG x2 (LIMA to LAD and SVG to OM), EF found to be 25% but improved to 55% in 2018.     Pt is still smoking.       Review of Systems   Constitutional: Positive for fever. Negative for chills.   HENT: Negative.    Eyes: Negative.    Respiratory: Negative for cough, hemoptysis and wheezing.    Cardiovascular: Negative for chest pain and palpitations.   Gastrointestinal: Negative.    Genitourinary: Negative.    Musculoskeletal: Positive for joint pain.   Skin:  Negative.    Neurological: Negative.    Endo/Heme/Allergies: Bruises/bleeds easily (left calf below knee).   Psychiatric/Behavioral: Negative.              Past Medical History (Chronic medical problem, known complications and current treatment)    COPD  HTN  T2DM  Afib  HFpEF  SHASHANK  CKD stge III  Hx of stroke  NSTEMI     Past Surgical History:  Past Surgical History:   Procedure Laterality Date   • COLONOSCOPY - ENDO N/A 7/25/2018    Procedure: COLONOSCOPY - ENDO;  Surgeon: Josiah Molina D.O.;  Location: ENDOSCOPY [facility];  Service: Gastroenterology   • THORACOSCOPY Left 1/25/2018    Procedure: THORACOSCOPY- PLEURODESIS ;  Surgeon: Chandu Paze M.D.;  Location: SURGERY [facility];  Service: General   • MULTIPLE CORONARY ARTERY BYPASS ENDO VEIN HARVEST  12/22/2017    Procedure: MULTIPLE CORONARY ARTERY BYPASS ENDO VEIN HARVEST X2;  Surgeon: Kiel Ash M.D.;  Location: SURGERY [facility];  Service: Cardiothoracic   • JIM  12/22/2017    Procedure: JIM;  Surgeon: Kiel Ash M.D.;  Location: SURGERY [facility];  Service: Cardiothoracic   • OTHER ABDOMINAL SURGERY         Current Outpatient Medications:  Home Medications    **Home medications have not yet been reviewed for this encounter**         Medication Allergy/Sensitivities:  Allergies   Allergen Reactions   • Cillins [Penicillins] Anaphylaxis and Rash     As a child, tolerated cefepime (12/2017), ceftriaxone (1/2018), cefazolin (12/2017)   • Erythromycin Anaphylaxis     Rxn - 1994   • Metoprolol      Pt stated got latham and aggressive, in demi dose's per wife.      • Shellfish Allergy Rash     Pt stated that he is allergic to all seafood, will develop a rash and says that rash will clear up with benadryl         Family History (mandatory)   Family History   Problem Relation Age of Onset   • Diabetes Mother    • Stroke Mother    • Leukemia Mother    • Diabetes Father    • No Known Problems Sister    • Heart Disease Brother          PPM   • Lung Disease Brother    • Alcohol/Drug Brother    • Diabetes Sister        Social History (mandatory)   Social History     Socioeconomic History   • Marital status:      Spouse name: Not on file   • Number of children: Not on file   • Years of education: Not on file   • Highest education level: Not on file   Occupational History   • Not on file   Social Needs   • Financial resource strain: Not on file   • Food insecurity:     Worry: Not on file     Inability: Not on file   • Transportation needs:     Medical: Not on file     Non-medical: Not on file   Tobacco Use   • Smoking status: Light Tobacco Smoker     Packs/day: 0.00     Years: 30.00     Pack years: 0.00     Types: Cigars, Cigarettes   • Smokeless tobacco: Never Used   • Tobacco comment: one cigarette a day   Substance and Sexual Activity   • Alcohol use: No   • Drug use: No   • Sexual activity: Not on file   Lifestyle   • Physical activity:     Days per week: Not on file     Minutes per session: Not on file   • Stress: Not on file   Relationships   • Social connections:     Talks on phone: Not on file     Gets together: Not on file     Attends Jain service: Not on file     Active member of club or organization: Not on file     Attends meetings of clubs or organizations: Not on file     Relationship status: Not on file   • Intimate partner violence:     Fear of current or ex partner: Not on file     Emotionally abused: Not on file     Physically abused: Not on file     Forced sexual activity: Not on file   Other Topics Concern   • Not on file   Social History Narrative   • Not on file       PCP : Nam Le M.D.    Physical Exam     Vitals:    08/15/19 0430 08/15/19 0500 08/15/19 0530 08/15/19 0600   BP: 113/70 111/64 127/74 115/72   Pulse: 77 84 75 73   Resp: 18 16 18 17   Temp:       TempSrc:       SpO2: 93% 93% 93% 94%   Weight:       Height:         Body mass index is 35.69 kg/m².  O2 therapy: Pulse Oximetry: 94 %, O2 (LPM): 2, O2  Delivery: Nasal Cannula    Physical Exam   Constitutional:   Appears stated age, lying comfortably in bed. Not in acute distress.    Cardiovascular:   Normal rate and rhythm. Systolic murmur. No gallop heard. No JVD, b/l DP pulse intact.    Pulmonary/Chest:   Clear breath sounds b/l, no wheezing and rales   Abdominal:   Soft, nondistended, no r/g   Musculoskeletal: He exhibits edema and tenderness.   Left leg externally rotated, left calf swollen with erythematous rash and skin very tight. Mild palpation triggers pain. Left knee is swollen.      Neurological:   Left foot able to dorsiflex and plantar flex, pt can still wiggle toes and has intact sensation.    Skin: Rash noted. There is erythema.   Rash and erythema extending below the left knee till lower leg. No ulcer or wound noticed on left leg.          Data Review       Old Records Request:   Completed  Current Records review/summary: Completed    Lab Data Review:  Recent Results (from the past 24 hour(s))   CBC WITH DIFFERENTIAL    Collection Time: 08/15/19 12:56 AM   Result Value Ref Range    WBC 25.7 (H) 4.8 - 10.8 K/uL    RBC 7.46 (H) 4.70 - 6.10 M/uL    Hemoglobin 18.5 (H) 14.0 - 18.0 g/dL    Hematocrit 59.2 (H) 42.0 - 52.0 %    MCV 79.4 (L) 81.4 - 97.8 fL    MCH 24.8 (L) 27.0 - 33.0 pg    MCHC 31.3 (L) 33.7 - 35.3 g/dL    RDW 76.8 (H) 35.9 - 50.0 fL    Platelet Count 296 164 - 446 K/uL    MPV 9.9 9.0 - 12.9 fL    Neutrophils-Polys 85.30 (H) 44.00 - 72.00 %    Lymphocytes 3.00 (L) 22.00 - 41.00 %    Monocytes 6.30 0.00 - 13.40 %    Eosinophils 2.50 0.00 - 6.90 %    Basophils 1.60 0.00 - 1.80 %    Immature Granulocytes 1.30 (H) 0.00 - 0.90 %    Nucleated RBC 0.00 /100 WBC    Neutrophils (Absolute) 21.92 (H) 1.82 - 7.42 K/uL    Lymphs (Absolute) 0.78 (L) 1.00 - 4.80 K/uL    Monos (Absolute) 1.63 (H) 0.00 - 0.85 K/uL    Eos (Absolute) 0.63 (H) 0.00 - 0.51 K/uL    Baso (Absolute) 0.40 (H) 0.00 - 0.12 K/uL    Immature Granulocytes (abs) 0.33 (H) 0.00 - 0.11  K/uL    NRBC (Absolute) 0.00 K/uL    Hypochromia 1+     Anisocytosis 2+     Microcytosis 1+    COMP METABOLIC PANEL    Collection Time: 08/15/19 12:56 AM   Result Value Ref Range    Sodium 134 (L) 135 - 145 mmol/L    Potassium 4.3 3.6 - 5.5 mmol/L    Chloride 98 96 - 112 mmol/L    Co2 25 20 - 33 mmol/L    Anion Gap 11.0 0.0 - 11.9    Glucose 151 (H) 65 - 99 mg/dL    Bun 26 (H) 8 - 22 mg/dL    Creatinine 1.08 0.50 - 1.40 mg/dL    Calcium 10.2 8.5 - 10.5 mg/dL    AST(SGOT) 18 12 - 45 U/L    ALT(SGPT) 13 2 - 50 U/L    Alkaline Phosphatase 150 (H) 30 - 99 U/L    Total Bilirubin 0.8 0.1 - 1.5 mg/dL    Albumin 3.7 3.2 - 4.9 g/dL    Total Protein 8.7 (H) 6.0 - 8.2 g/dL    Globulin 5.0 (H) 1.9 - 3.5 g/dL    A-G Ratio 0.7 g/dL   LACTIC ACID    Collection Time: 08/15/19 12:56 AM   Result Value Ref Range    Lactic Acid 1.5 0.5 - 2.0 mmol/L   PROTHROMBIN TIME (INR)    Collection Time: 08/15/19 12:56 AM   Result Value Ref Range    PT 45.0 (H) 12.0 - 14.6 sec    INR 4.55 (H) 0.87 - 1.13   ESTIMATED GFR    Collection Time: 08/15/19 12:56 AM   Result Value Ref Range    GFR If African American >60 >60 mL/min/1.73 m 2    GFR If Non African American >60 >60 mL/min/1.73 m 2   PERIPHERAL SMEAR REVIEW    Collection Time: 08/15/19 12:56 AM   Result Value Ref Range    Peripheral Smear Review see below    PLATELET ESTIMATE    Collection Time: 08/15/19 12:56 AM   Result Value Ref Range    Plt Estimation Normal    MORPHOLOGY    Collection Time: 08/15/19 12:56 AM   Result Value Ref Range    RBC Morphology Present    DIFFERENTIAL COMMENT    Collection Time: 08/15/19 12:56 AM   Result Value Ref Range    Comments-Diff see below    APTT    Collection Time: 08/15/19 12:56 AM   Result Value Ref Range    APTT 60.6 (H) 24.7 - 36.0 sec       Imaging/Procedures Review:    Independant Imaging Review: Completed  CT-CTA LOWER EXT WITH & W/O-POST PROCESS LEFT   Final Result         1.  Intermediate density fluid tracking along the posterior aspect of the  right lower leg posterior compartment, appearance most compatible with hematoma. Given location there is concern for risk for development of compartment syndrome.   2.  Left knee joint effusion.   3.  Segment of nonopacification of the proximal tibioperoneal trunk on the left appears to represent occlusion, there is reconstitution with three-vessel runoff of the left ankle.   4.  Scattered right lower extremity atherosclerotic calcifications with less than 50% stenosis.      These findings were discussed with the patient's clinician, VANESSA CONNOR, on 8/15/2019 3:23 AM.      DX-KNEE 3 VIEWS LEFT   Final Result         1.  No acute traumatic bony injury.   2.  Tricompartmental degenerative changes.   3.  Suprapatellar effusion.   4.  Atherosclerosis           Records reviewed and summarized in current documentation :  No  [employer] teaching service handout given to patient:  No         Assessment/Plan     Traumatic hematoma of left lower leg  Assessment & Plan  Pt is on warfarin with supratherapeutic INR 4.55. Concerned for causing compartment syndrome but orthopedic surgeon states they are not, recommended to do conservative measure and would evacuate hematoma as soon as pt's INR is reversed.     Plan  Neurovascular checks q6h  Hot and cold compress with leg elevation.   Pain control: acetaminophen 1000mg q8h, roxicodone 4mg qh  Orthopedic surgery on board and following    Left leg Cellulitis  Assessment & Plan  WBC ct is 25.7, on exam pt's leg look erythematous and tender to touch, though pt is afebrile we started pt on ceftriaxone.    Plan  Continue on ceftriaxone      Heart failure with preserved ejection fraction, borderline, class IV (HCC)- (present on admission)  Assessment & Plan  Stable. Last EF is 55%, pt has mild concentric left ventricular hypertrophy, enlarged right atrium and severely dilated right ventricle. Extremities with strong pulse and well perfused.    Plan  Restarted lisinopril, furosemide,  metoprolol, rosuvastatin, spironolactone.     CAD (coronary artery disease)- (present on admission)  Assessment & Plan  S/p CABG x2 (LIMA to LAD and SVG to OM), on Statin, lisinopril    Plan  Restart rosuvastatin 40mg.   Lisinopril 2.5mg  Metoprolol 25 mg    Chronic obstructive pulmonary disease with acute exacerbation (HCC)- (present on admission)  Assessment & Plan  Stable. Hct 59.2 On Spiriva, albuterol, and duonebs    Plan  Continue home spiriva, albuterol and duonebs    Paroxysmal atrial fibrillation (HCC)- (present on admission)  Assessment & Plan  Stable. On amiodarone    Plan  Continue amiodarone 200mg qdaily      Anticipated Hospital stay:  >2 midnights        Quality Measures  Quality-Core Measures   Reviewed items::  Labs reviewed, Medications reviewed and Radiology images reviewed  Ceballos catheter::  No Ceballos    PCP: Nam Le M.D.

## 2019-08-15 NOTE — ASSESSMENT & PLAN NOTE
Pt was on warfarin with supratherapeutic INR 4.55 on admission. Initial concern for compartment syndrome but not present per orthopedic surgeon, recommended conservative measures and would evacuate hematoma as soon as pt's INR is reversed. He received Vit-K and FFP. INR was subsequently subtherapeutic to 1.3. Restarted warfarin on 8/20.   Patient is for SNF as per PT/OT recommendations but patient refused want to go home instead.    Plan  Hot and cold compress with leg elevation.   Pain control: PRN oxycodone.  Discharge home if the patient passes stool.   Patient to get Home health therapy  for gait training as per PT.

## 2019-08-15 NOTE — CARE PLAN
Problem: Communication  Goal: The ability to communicate needs accurately and effectively will improve  Outcome: PROGRESSING AS EXPECTED     Problem: Safety  Goal: Will remain free from injury  Outcome: PROGRESSING AS EXPECTED  Goal: Will remain free from falls  Outcome: PROGRESSING AS EXPECTED     Problem: Infection  Goal: Will remain free from infection  Outcome: PROGRESSING AS EXPECTED     Problem: Venous Thromboembolism (VTW)/Deep Vein Thrombosis (DVT) Prevention:  Goal: Patient will participate in Venous Thrombosis (VTE)/Deep Vein Thrombosis (DVT)Prevention Measures  Outcome: PROGRESSING AS EXPECTED     Problem: Bowel/Gastric:  Goal: Normal bowel function is maintained or improved  Outcome: PROGRESSING AS EXPECTED  Goal: Will not experience complications related to bowel motility  Outcome: PROGRESSING AS EXPECTED     Problem: Knowledge Deficit  Goal: Knowledge of disease process/condition, treatment plan, diagnostic tests, and medications will improve  Outcome: PROGRESSING AS EXPECTED  Goal: Knowledge of the prescribed therapeutic regimen will improve  Outcome: PROGRESSING AS EXPECTED     Problem: Discharge Barriers/Planning  Goal: Patient's continuum of care needs will be met  Outcome: PROGRESSING AS EXPECTED     Problem: Pain Management  Goal: Pain level will decrease to patient's comfort goal  Outcome: PROGRESSING AS EXPECTED     Problem: Respiratory:  Goal: Respiratory status will improve  Outcome: PROGRESSING AS EXPECTED

## 2019-08-15 NOTE — ED NOTES
Medication Reconciliation updated and complete per pt at bedside with list  Reviewed list with pt and returned to pt at bedside  Allergies have been verified and updated   No oral ABX within the last 14 days  Pt Home Pharmacy:[facility]

## 2019-08-15 NOTE — ASSESSMENT & PLAN NOTE
Stable. Hct 59.2 On Spiriva, albuterol, and duonebs.    Plan  Continue home spiriva, albuterol and duonebs

## 2019-08-15 NOTE — ED PROVIDER NOTES
ED Provider Note    CHIEF COMPLAINT  Chief Complaint   Patient presents with   • Leg Pain     L leg pain. pt hit his knee 5 days ago on the cabinet and now says he cant walk or move it.        HPI  Joshua Medrano is a 53 y.o. male who presents for evaluation of left knee pain.  Patient notes he hit his knee between the kneecap and the top of his tibia approximately a week ago on the corner of a table.  He had pain at the time but was able to ambulate.  Throughout the week the patient has had increasing pain and swelling in the knee as well as the calf.  He is now unable to bear weight, straight leg, or bend the leg due to pain in this area.    REVIEW OF SYSTEMS  Constitutional: No fevers, or chills  Skin: No rashes, abrasions, lacerations, or pruritus  HEENT: No ear pain, ringing in ears, or decreased hearing. No sore throat, runny nose, sores, trouble swallowing, trouble speaking.  Neck: No neck pain, stiffness, or masses.  Chest: No pain or rashes  Pulm: Chronic shortness of breath.  No cough, wheezing, stridor, or pain with inspiration/expiration  Gastrointestinal: No nausea, vomiting, diarrhea, constipation, bloating, melena, hematochezia or pain.  Genitourinary: No dysuria or hematuria  Musculoskeletal: Pain to left knee and calf  Neurologic: No sensory or motor changes. No confusion or disorientation.  Heme: Bruises and bleeds easily due to warfarin use.  Patient has history of blood clots in arm in a postsurgical environment  Immuno: No hx of recurrent infections      PAST MEDICAL HISTORY   has a past medical history of ASTHMA, Atrial fibrillation (HCC), CAD (coronary artery disease), Chronic obstructive pulmonary disease (HCC), Congestive heart failure (HCC), and Diabetes.    SOCIAL HISTORY  Social History     Tobacco Use   • Smoking status: Light Tobacco Smoker     Packs/day: 0.00     Years: 30.00     Pack years: 0.00     Types: Cigars, Cigarettes   • Smokeless tobacco: Never Used   • Tobacco comment: one  "cigarette a day   Substance and Sexual Activity   • Alcohol use: No   • Drug use: No   • Sexual activity: Not on file       SURGICAL HISTORY   has a past surgical history that includes other abdominal surgery; multiple coronary artery bypass endo vein harvest (12/22/2017); oscar (12/22/2017); thoracoscopy (Left, 1/25/2018); and colonoscopy - endo (N/A, 7/25/2018).    CURRENT MEDICATIONS  Home Medications    **Home medications have not yet been reviewed for this encounter**         ALLERGIES  Allergies   Allergen Reactions   • Cillins [Penicillins] Anaphylaxis and Rash     As a child, tolerated cefepime (12/2017), ceftriaxone (1/2018), cefazolin (12/2017)   • Erythromycin Anaphylaxis     Rxn - 1994   • Metoprolol      Pt stated got latham and aggressive, in demi dose's per wife.      • Shellfish Allergy Rash     Pt stated that he is allergic to all seafood, will develop a rash and says that rash will clear up with benadryl       PHYSICAL EXAM  VITAL SIGNS: /64   Pulse 84   Temp 36.1 °C (97 °F) (Temporal)   Resp 16   Ht 1.956 m (6' 5\")   Wt (!) 136.5 kg (301 lb)   SpO2 93%   BMI 35.69 kg/m²    Gen: Alert in no apparent distress.  Calm  HEENT: No signs of trauma, Bilateral external ears normal, Nose normal. Conjunctiva normal, Non-icteric.   Neck:  No tenderness, Supple, No masses  Lymphatic: No cervical lymphadenopathy noted.   Cardiovascular: Regular rate and rhythm.  Thorax & Lungs: Normal breath sounds, No respiratory distress, No wheezing bilateral chest rise  Abdomen: Bowel sounds normal, Soft, No tenderness, No masses, No pulsatile masses. No Guarding or rebound  Skin: Warm, Dry, No erythema, No rash.   Extremities: Left knee edematous with apparent effusion, no overlying erythema but there is diffuse tenderness which is worse in the infrapatellar region.  There is tense swelling to the left medial and posterior compartments of the calf with tenderness as well.  The foot appears mildly erythemic but is " blanchable.  Patient is able to wiggle toes and has sensation intact light touch.  There are no dopplerable pulses needed posterior tibial or dorsalis pedis.  Capillary refill is less than 3 seconds.  Right lower extremity has no tenderness of the knee, calf, or foot.  Capillary refill is less than 3 seconds and there are easily dopplerable dorsalis pedis and posterior tibials on that side.  Neurologic: Alert , no facial droop, grossly normal coordination and strength  Psychiatric: Affect normal, Judgment normal, Mood normal.       INITIAL IMPRESSION  Patient has a findings concerning for several different issues, the first being possible bleeding into the knee capsule and calf, the second is a distinct lack of pulses, either palpable or dopplerable, and the left foot, and possible infectious process.  It is notable the patient is on warfarin and is unlikely this is DVT related however we will review his labs and consider further imaging after the plain films of the knee.  Patient is able to dorsiflex and plantarflex the ankle to some degree however around 90 degrees it becomes too painful to flex any further.  There is tenderness to the calf region, most prominently in the medial portion.  Patient does not have the pallor or pain in the foot typically associated with compartment syndrome or acute arterial thrombus however these both need to be considered.  Will likely need some form of angiography.    LABS  Results for orders placed or performed during the hospital encounter of 08/14/19   CBC WITH DIFFERENTIAL   Result Value Ref Range    WBC 25.7 (H) 4.8 - 10.8 K/uL    RBC 7.46 (H) 4.70 - 6.10 M/uL    Hemoglobin 18.5 (H) 14.0 - 18.0 g/dL    Hematocrit 59.2 (H) 42.0 - 52.0 %    MCV 79.4 (L) 81.4 - 97.8 fL    MCH 24.8 (L) 27.0 - 33.0 pg    MCHC 31.3 (L) 33.7 - 35.3 g/dL    RDW 76.8 (H) 35.9 - 50.0 fL    Platelet Count 296 164 - 446 K/uL    MPV 9.9 9.0 - 12.9 fL    Neutrophils-Polys 85.30 (H) 44.00 - 72.00 %     Lymphocytes 3.00 (L) 22.00 - 41.00 %    Monocytes 6.30 0.00 - 13.40 %    Eosinophils 2.50 0.00 - 6.90 %    Basophils 1.60 0.00 - 1.80 %    Immature Granulocytes 1.30 (H) 0.00 - 0.90 %    Nucleated RBC 0.00 /100 WBC    Neutrophils (Absolute) 21.92 (H) 1.82 - 7.42 K/uL    Lymphs (Absolute) 0.78 (L) 1.00 - 4.80 K/uL    Monos (Absolute) 1.63 (H) 0.00 - 0.85 K/uL    Eos (Absolute) 0.63 (H) 0.00 - 0.51 K/uL    Baso (Absolute) 0.40 (H) 0.00 - 0.12 K/uL    Immature Granulocytes (abs) 0.33 (H) 0.00 - 0.11 K/uL    NRBC (Absolute) 0.00 K/uL    Hypochromia 1+     Anisocytosis 2+     Microcytosis 1+    COMP METABOLIC PANEL   Result Value Ref Range    Sodium 134 (L) 135 - 145 mmol/L    Potassium 4.3 3.6 - 5.5 mmol/L    Chloride 98 96 - 112 mmol/L    Co2 25 20 - 33 mmol/L    Anion Gap 11.0 0.0 - 11.9    Glucose 151 (H) 65 - 99 mg/dL    Bun 26 (H) 8 - 22 mg/dL    Creatinine 1.08 0.50 - 1.40 mg/dL    Calcium 10.2 8.5 - 10.5 mg/dL    AST(SGOT) 18 12 - 45 U/L    ALT(SGPT) 13 2 - 50 U/L    Alkaline Phosphatase 150 (H) 30 - 99 U/L    Total Bilirubin 0.8 0.1 - 1.5 mg/dL    Albumin 3.7 3.2 - 4.9 g/dL    Total Protein 8.7 (H) 6.0 - 8.2 g/dL    Globulin 5.0 (H) 1.9 - 3.5 g/dL    A-G Ratio 0.7 g/dL   LACTIC ACID   Result Value Ref Range    Lactic Acid 1.5 0.5 - 2.0 mmol/L   PROTHROMBIN TIME (INR)   Result Value Ref Range    PT 45.0 (H) 12.0 - 14.6 sec    INR 4.55 (H) 0.87 - 1.13   ESTIMATED GFR   Result Value Ref Range    GFR If African American >60 >60 mL/min/1.73 m 2    GFR If Non African American >60 >60 mL/min/1.73 m 2   PERIPHERAL SMEAR REVIEW   Result Value Ref Range    Peripheral Smear Review see below    PLATELET ESTIMATE   Result Value Ref Range    Plt Estimation Normal    MORPHOLOGY   Result Value Ref Range    RBC Morphology Present    DIFFERENTIAL COMMENT   Result Value Ref Range    Comments-Diff see below    APTT   Result Value Ref Range    APTT 60.6 (H) 24.7 - 36.0 sec       RADIOLOGY  CT-CTA LOWER EXT WITH & W/O-POST PROCESS  LEFT   Final Result         1.  Intermediate density fluid tracking along the posterior aspect of the right lower leg posterior compartment, appearance most compatible with hematoma. Given location there is concern for risk for development of compartment syndrome.   2.  Left knee joint effusion.   3.  Segment of nonopacification of the proximal tibioperoneal trunk on the left appears to represent occlusion, there is reconstitution with three-vessel runoff of the left ankle.   4.  Scattered right lower extremity atherosclerotic calcifications with less than 50% stenosis.      These findings were discussed with the patient's clinician, VANESSA CONNOR, on 8/15/2019 3:23 AM.      DX-KNEE 3 VIEWS LEFT   Final Result         1.  No acute traumatic bony injury.   2.  Tricompartmental degenerative changes.   3.  Suprapatellar effusion.   4.  Atherosclerosis        Reevaluation   Time:4:08 AM  Vital signs: Noted per nursing  Assessment:Discussed case with on-call orthopedic surgeon who see the patient today.    Reevaluation   Time:5:45 AM  Vital signs: Noted per nursing note  Assessment: Sleeping quietly, no apparent distress.  Still able to plantar and dorsiflex left ankle.  Patient states his pain has improved and is certainly not worse than it was before.      COURSE & MEDICAL DECISION MAKING  Pertinent Labs & Imaging studies reviewed. (See chart for details)  Patient arrives for evaluation of left lower extremity pain which appears to be related to a traumatic hematoma stemming from what appeared to be relatively minor trauma about a week ago.  It is notable the patient's INR was elevated and he had a possible infected hematoma causing possible early compartment syndrome.  Although he was noted to have no palpable or dopplerable pulses to the left lower extremity, there was reconstitution and 3 vessels at the ankle.  There was a small area deep in the calf that appeared to be occluded however the blood was clearly  reconstituting.  It is unclear whether the patient has early compartment syndrome however this will be watched closely.  Patient was evaluated orthopedic surgeon and will be admitted to the [facility] internal medicine service    FINAL IMPRESSION  1. Traumatic hematoma of lower leg with infection, initial encounter    2. Hemarthrosis involving knee joint, left        Electronically signed by: Terrance Sotelo, 8/14/2019 11:36 PM

## 2019-08-15 NOTE — SENIOR ADMIT NOTE
Senior Admission Note    In summary: Joshua Medrano is a 53 y.o. male with past medical history of HTN, HF, COPD, CAD, and atrial fibrillation that presented to the ER with due to left leg pain.     Patient reported that 6 days ago he bump his left calf. Then he noticed progressive swelling and pain. He said that 3 days ago swelling worsen to the point that he can not walk due to pain. Patient denies chest pain, palpitations, fever or chills. Unable to palpate pulses, pain on palpation present, no changes in sensation.        Assessment and plan in summary:    #Left calf hematoma    - patient bumped his calf 6 days ago and has had progressive swelling and pain    - CT-CTA leg showed risk of compartment syndrome and Orthopedic surgeon  consulted   #Leukocytosis    - WBC 25   - Left LE with erythema and swelling. Hematoma. Risk of infection    - Continue Ceftriaxone   #Elevated INR    - INR 4.55    - Patient on warfarin for anticoagulation due to atrial fibrillation   #COPD    - no exacerbation at this time   #Atrial fibrillation    - on warfarin for anticoagulation    - on  Metoprolol   #HFpEF    - ECHO 11/08/18 showed EF 55% and Severely dilated right ventricle. Mild mitral  regurgitation    - has follow up with Cardiology as outpatient   #HTN     - admitted to surgical floor   - close monitoring for sings of compartment syndrome   - Vitamin K for anticoagulation reversal   - Orthopedic surgeon consulted - recommended conservative management  - Continue Ceftriaxone    - pain management   - Hold warfarin   - Continue home medications     For full plan, please see Intern note for details   Edward Arevalo M.D.  PGY 3

## 2019-08-15 NOTE — CONSULTS
DATE OF SERVICE:  08/15/2019    CHIEF COMPLAINT:  Left leg swelling.    HISTORY OF PRESENT ILLNESS:  The patient is a 53-year-old gentleman with   multiple medical problems, who apparently bumped his calf about 7 days ago and   has had progressive swelling and pain and he got to the point where it was   hurt him quite a bit even touch, so he came into the emergency department.  He   was evaluated with x-rays, CT scan and clinical evaluation and Dr. Sotelo in   the emergency department asked me to come in to rule out compartment syndrome.    PAST MEDICAL HISTORY:  Significant for peripheral vascular disease, atrial   fibrillation, coronary artery disease, COPD, congestive heart failure,   diabetes, and asthma.    PAST SURGICAL HISTORY:  Abdominal surgery, multiple coronary artery bypass   surgeries, thoracoscopy, colonoscopy.    SOCIAL HISTORY:  He is a current smoker of cigars, used to smoke cigarettes.    Denies alcohol use.    FAMILY HISTORY:  Noncontributory.    CURRENT MEDICATIONS:  Please see the attached list.    ALLERGIES:  PENICILLIN, ERYTHROMYCIN, METOPROLOL, AND SHELLFISH.    REVIEW OF SYSTEMS:  A 12-point review of systems is negative.    PHYSICAL EXAMINATION:  GENERAL:  He is somewhat sickly-appearing gentleman, who appears much older   than his stated age.  He is alert and oriented x4.  His mood is appropriate to   situation.  ADMISSION VITAL SIGNS:  Pulse 76, blood pressure 123/72, respirations 18.  HEENT:  Pupils are equal, round and react to light and accommodate.    Extraocular muscles are intact.  NECK:  Full pain free range of motion of cervical spine.  HEART:  Regular heart rate.  LUNGS:  Regular respirations.  ABDOMEN:  Benign.  EXTREMITIES:  Bilateral upper extremities are involved.  His right lower   extremity is uninvolved.  Examination of the left lower extremity shows   significant trophic changes consistent with chronic peripheral vascular   disease and venous insufficiency.  He has some  mild swelling of the calf, most   pronounced along the posteromedial calf.  He has tenderness to palpation over   that area of swelling and induration.  He has no pain with passive stretch of   the foot.  He has full active dorsiflexion and plantarflexion of the foot.    His compartments are soft and compressible.  His CT scan shows a small   hematoma.    ADMISSION LABS:  White blood cell count 25.7, hemoglobin 18.5, platelets 296.    Glucose 151, sodium 134, creatinine 1.08.  INR 4.55 and lactic acid 1.5.    IMAGING:  X-rays show calcifications in his vessels and clips consistent with   previous saphenous vein harvest.    ASSESSMENT:  1.  Left calf hematoma.  2.  No evidence of compartment syndrome.    PLAN:  The patient is going to be admitted to the internal medicine service.    I have his anticoagulation reversed and I would just recommend conservative   measures for treatment of the calf.  At this point, there is no evidence for   compartment syndrome and there is a large localized hematoma that needs to be   evacuated.  I recommend alternating hot and cold compresses around the leg,   elevation of the leg and then other conservative measures as deemed   appropriate by the admitting physicians and treatment team.       ____________________________________     HAROON GALINDO MD RD / YOSVANY    DD:  08/15/2019 05:05:44  DT:  08/15/2019 05:27:22    D#:  [id number]  Job#:  [id number]

## 2019-08-15 NOTE — ED NOTES
Patient resting comfortably in bed, lab at bedside, two blood cultures drawn.  Denies any needs at this time.

## 2019-08-15 NOTE — PROGRESS NOTES
· 2 RN skin check complete.   · Devices in place O2 nasal cannula, Right AC PIV.  · Skin assessed under devices yes.    Healing biopsy site to right shoulder  Red, micheal, dry, scaley BLEs with healing scabs-cool, with good cap refill.   Dry, scaly skin patch to right arm.   Scattered tiny scabs to abdomen.         · Confirmed pressure ulcers found on N/A.  · New potential pressure ulcers noted on N/A. Wound consult placed? N/A. Photo uploaded? N/A.   · The following interventions are in place daily cleansing, elevation of extremities, moisturizer.

## 2019-08-15 NOTE — PROGRESS NOTES
Attempted to elevate patient's leg on pillows.     Patient could not lift left leg, and screamed out in pain when writer gently attempted to lift leg.     Warm blanket applied.     [facility] team notified. Resident stated that he would talk to his Senior about increasing pain medications.     Resident notified that patient will not tolerate SCDs. '    Diet order received as patient's INR is too high for surgical intervention today.

## 2019-08-15 NOTE — ED NOTES
Unable to palpate bilateral pedal pulses, unable to obtain pulses with doppler, ERP notified, ERP at bedside attempting to obtain pedal pulses via doppler.

## 2019-08-15 NOTE — ED NOTES
Surgeon consulted patient    Patient medicated per MAR, patient tolerated ABX, updated on POC, VSS

## 2019-08-15 NOTE — ASSESSMENT & PLAN NOTE
S/p CABG x2 (LIMA to LAD and SVG to OM), on Statin, lisinopril.    Plan  Continue home medications

## 2019-08-15 NOTE — PROGRESS NOTES
Internal Medicine Interval Note  Note Author: Leandro Santos M.D.     Name Joshua Medrano     1965   Age/Sex 53 y.o. male   MRN [id number]   Code Status Full     After 5PM or if no immediate response to page, please call for cross-coverage  Attending/Team: Dr. Mccurdy/Tomas See Patient List for primary contact information  Call [phone] to page    1st Call - Day Intern (R1):   Tom 2nd Call - Day Sr. Resident (R2/R3):   Smith         Reason for interval visit  (Principal Problem)   Left lower extremity hematoma      Interval Problem Daily Status Update  (24 hours, problem oriented, brief subjective history, new lab/imaging data pertinent to that problem)   Left lower extremity is erythematous, edematous and warm to touch.   Initially patient complaints about pain 10/10, but after pain regimen pain decreased to 6/10.  Pt have hx of paroxsymal A-fib and his dose of warfarin increased from 5 mg QD to 7.5 mg QD since 2019  INR upon admission is 4.5 and pt given Iv Vit-K 10 mg and  ml. Will repeat INR tomorrow morning  Ortho surgery on board     Review of Systems   Constitutional: Negative for chills, fever, malaise/fatigue and weight loss.   HENT: Negative.    Eyes: Negative.    Respiratory: Negative for cough, hemoptysis, sputum production and shortness of breath.    Cardiovascular: Positive for leg swelling (Left ). Negative for chest pain, palpitations, orthopnea and claudication.   Gastrointestinal: Negative for abdominal pain, blood in stool, constipation, diarrhea, heartburn, nausea and vomiting.   Musculoskeletal: Positive for joint pain (Left LE ). Negative for back pain, myalgias and neck pain.   Skin: Positive for itching (BLLE) and rash (BLLE).   Neurological: Negative.    Psychiatric/Behavioral: Negative.        Disposition/Barriers to discharge:   Left leg hematoma need evacuation     Consultants/Specialty  Ortho surgery  PCP: Nam Le M.D.      Quality  Measures  Quality-Core Measures   Reviewed items::  EKG reviewed, Labs reviewed, Medications reviewed and Radiology images reviewed  Ceballos catheter::  No Ceballos  DVT prophylaxis pharmacological::  Contraindicated - High bleeding risk          Physical Exam       Vitals:    08/15/19 0949 08/15/19 1415 08/15/19 1430 08/15/19 1537   BP: 117/73 114/73 119/77    Pulse: 72 69 71 71   Resp: 16 15 16 18   Temp: 37.1 °C (98.7 °F) 36.4 °C (97.6 °F) 36.8 °C (98.2 °F)    TempSrc: Temporal Temporal Temporal    SpO2: 93% 92% 95%    Weight:       Height:         Body mass index is 35.69 kg/m². Weight: (!) 136.5 kg (301 lb)  Oxygen Therapy:  Pulse Oximetry: 95 %, O2 (LPM): 2, O2 Delivery: Nasal Cannula    Physical Exam   Constitutional: He is oriented to person, place, and time. He appears distressed.   HENT:   Head: Normocephalic and atraumatic.   Eyes: Pupils are equal, round, and reactive to light. Conjunctivae and EOM are normal.   Neck: Normal range of motion. Neck supple.   Cardiovascular: Normal rate, regular rhythm, normal heart sounds and intact distal pulses.   No murmur heard.  Pulmonary/Chest: Effort normal and breath sounds normal. No respiratory distress. He has no wheezes. He has no rales. He exhibits no tenderness.   Abdominal: Soft. Bowel sounds are normal. He exhibits no distension. There is no tenderness.   Musculoskeletal: Normal range of motion. He exhibits edema (left lower extremity) and tenderness. He exhibits no deformity.   Neurological: He is alert and oriented to person, place, and time. No cranial nerve deficit. Gait normal. Coordination normal.   Skin: Skin is warm and dry. He is not diaphoretic. There is erythema (left lower extremity ).             Assessment/Plan     * Traumatic hematoma of left lower leg  Assessment & Plan  Pt is on warfarin with supratherapeutic INR 4.55. Concerned for causing compartment syndrome but orthopedic surgeon states they are not, recommended to do conservative measure  and would evacuate hematoma as soon as pt's INR is reversed.     Plan  Given Vit-K and FFP  Repeat INR tomorrow   Neurovascular checks q6h  Hot and cold compress with leg elevation.   Pain control: iv Morphine PRN and other pain regimen   Orthopedic surgery on board and following    Heart failure with preserved ejection fraction, borderline, class IV (HCC)- (present on admission)  Assessment & Plan  Stable. Last EF is 55%, pt has mild concentric left ventricular hypertrophy, enlarged right atrium and severely dilated right ventricle. Extremities with strong pulse and well perfused.    Plan  Restarted lisinopril, furosemide, metoprolol, rosuvastatin, spironolactone.     CAD (coronary artery disease)- (present on admission)  Assessment & Plan  S/p CABG x2 (LIMA to LAD and SVG to OM), on Statin, lisinopril    Plan  Restart rosuvastatin 40mg.   Lisinopril 2.5mg  Metoprolol 25 mg    Chronic obstructive pulmonary disease with acute exacerbation (HCC)- (present on admission)  Assessment & Plan  Stable. Hct 59.2 On Spiriva, albuterol, and duonebs    Plan  Continue home spiriva, albuterol and duonebs    Left leg Cellulitis  Assessment & Plan  WBC ct is 25.7, on exam pt's leg look erythematous and tender to touch, though pt is afebrile we started pt on ceftriaxone (ER)    Plan  Ceftriaxone have the highest risk for C.def infection to patient  D/c Ceftriaxone and will monitor closely for any sign and symptoms for infection  Probably will consider Augmentin        Paroxysmal atrial fibrillation (HCC)- (present on admission)  Assessment & Plan  Stable. On amiodarone    Plan  Continue amiodarone 200mg qdaily

## 2019-08-16 PROBLEM — M79.89 LEFT LEG SWELLING: Status: ACTIVE | Noted: 2019-08-15

## 2019-08-16 LAB
ALBUMIN SERPL BCP-MCNC: 3.2 G/DL (ref 3.2–4.9)
ALBUMIN/GLOB SERPL: 0.8 G/DL
ALP SERPL-CCNC: 129 U/L (ref 30–99)
ALT SERPL-CCNC: 13 U/L (ref 2–50)
ANION GAP SERPL CALC-SCNC: 8 MMOL/L (ref 0–11.9)
AST SERPL-CCNC: 21 U/L (ref 12–45)
BACTERIA BLD CULT: ABNORMAL
BACTERIA BLD CULT: ABNORMAL
BASOPHILS # BLD AUTO: 1.1 % (ref 0–1.8)
BASOPHILS # BLD: 0.25 K/UL (ref 0–0.12)
BILIRUB SERPL-MCNC: 0.8 MG/DL (ref 0.1–1.5)
BUN SERPL-MCNC: 24 MG/DL (ref 8–22)
CALCIUM SERPL-MCNC: 9 MG/DL (ref 8.5–10.5)
CHLORIDE SERPL-SCNC: 99 MMOL/L (ref 96–112)
CO2 SERPL-SCNC: 25 MMOL/L (ref 20–33)
CREAT SERPL-MCNC: 0.97 MG/DL (ref 0.5–1.4)
EOSINOPHIL # BLD AUTO: 0.86 K/UL (ref 0–0.51)
EOSINOPHIL NFR BLD: 3.9 % (ref 0–6.9)
ERYTHROCYTE [DISTWIDTH] IN BLOOD BY AUTOMATED COUNT: 77 FL (ref 35.9–50)
EST. AVERAGE GLUCOSE BLD GHB EST-MCNC: 143 MG/DL
GGT SERPL-CCNC: 70 U/L (ref 7–51)
GLOBULIN SER CALC-MCNC: 4.2 G/DL (ref 1.9–3.5)
GLUCOSE SERPL-MCNC: 198 MG/DL (ref 65–99)
HBA1C MFR BLD: 6.6 % (ref 0–5.6)
HCT VFR BLD AUTO: 52.2 % (ref 42–52)
HGB BLD-MCNC: 16.1 G/DL (ref 14–18)
IMM GRANULOCYTES # BLD AUTO: 0.25 K/UL (ref 0–0.11)
IMM GRANULOCYTES NFR BLD AUTO: 1.1 % (ref 0–0.9)
INR PPP: 1.33 (ref 0.87–1.13)
LYMPHOCYTES # BLD AUTO: 1.02 K/UL (ref 1–4.8)
LYMPHOCYTES NFR BLD: 4.6 % (ref 22–41)
MCH RBC QN AUTO: 24.6 PG (ref 27–33)
MCHC RBC AUTO-ENTMCNC: 30.8 G/DL (ref 33.7–35.3)
MCV RBC AUTO: 79.8 FL (ref 81.4–97.8)
MONOCYTES # BLD AUTO: 1.52 K/UL (ref 0–0.85)
MONOCYTES NFR BLD AUTO: 6.9 % (ref 0–13.4)
NEUTROPHILS # BLD AUTO: 18.14 K/UL (ref 1.82–7.42)
NEUTROPHILS NFR BLD: 82.4 % (ref 44–72)
NRBC # BLD AUTO: 0 K/UL
NRBC BLD-RTO: 0 /100 WBC
PLATELET # BLD AUTO: 284 K/UL (ref 164–446)
PMV BLD AUTO: 9.4 FL (ref 9–12.9)
POTASSIUM SERPL-SCNC: 4.2 MMOL/L (ref 3.6–5.5)
PROT SERPL-MCNC: 7.4 G/DL (ref 6–8.2)
PROTHROMBIN TIME: 16.9 SEC (ref 12–14.6)
RBC # BLD AUTO: 6.54 M/UL (ref 4.7–6.1)
SIGNIFICANT IND 70042: ABNORMAL
SITE SITE: ABNORMAL
SODIUM SERPL-SCNC: 132 MMOL/L (ref 135–145)
SOURCE SOURCE: ABNORMAL
WBC # BLD AUTO: 22 K/UL (ref 4.8–10.8)

## 2019-08-16 PROCEDURE — 99232 SBSQ HOSP IP/OBS MODERATE 35: CPT | Mod: GC | Performed by: INTERNAL MEDICINE

## 2019-08-16 PROCEDURE — A9270 NON-COVERED ITEM OR SERVICE: HCPCS | Performed by: INTERNAL MEDICINE

## 2019-08-16 PROCEDURE — 700102 HCHG RX REV CODE 250 W/ 637 OVERRIDE(OP): Performed by: INTERNAL MEDICINE

## 2019-08-16 PROCEDURE — 36415 COLL VENOUS BLD VENIPUNCTURE: CPT

## 2019-08-16 PROCEDURE — A9270 NON-COVERED ITEM OR SERVICE: HCPCS | Performed by: STUDENT IN AN ORGANIZED HEALTH CARE EDUCATION/TRAINING PROGRAM

## 2019-08-16 PROCEDURE — 94760 N-INVAS EAR/PLS OXIMETRY 1: CPT

## 2019-08-16 PROCEDURE — 94640 AIRWAY INHALATION TREATMENT: CPT

## 2019-08-16 PROCEDURE — 83036 HEMOGLOBIN GLYCOSYLATED A1C: CPT

## 2019-08-16 PROCEDURE — 770006 HCHG ROOM/CARE - MED/SURG/GYN SEMI*

## 2019-08-16 PROCEDURE — 700102 HCHG RX REV CODE 250 W/ 637 OVERRIDE(OP): Performed by: STUDENT IN AN ORGANIZED HEALTH CARE EDUCATION/TRAINING PROGRAM

## 2019-08-16 PROCEDURE — 700101 HCHG RX REV CODE 250: Performed by: INTERNAL MEDICINE

## 2019-08-16 PROCEDURE — 87040 BLOOD CULTURE FOR BACTERIA: CPT

## 2019-08-16 RX ADMIN — OXYCODONE HYDROCHLORIDE 10 MG: 5 TABLET ORAL at 14:15

## 2019-08-16 RX ADMIN — FERROUS SULFATE TAB 325 MG (65 MG ELEMENTAL FE) 325 MG: 325 (65 FE) TAB at 08:24

## 2019-08-16 RX ADMIN — OXYCODONE HYDROCHLORIDE 10 MG: 5 TABLET ORAL at 19:02

## 2019-08-16 RX ADMIN — FUROSEMIDE 40 MG: 40 TABLET ORAL at 06:09

## 2019-08-16 RX ADMIN — SPIRONOLACTONE 25 MG: 25 TABLET ORAL at 06:10

## 2019-08-16 RX ADMIN — AMIODARONE HYDROCHLORIDE 200 MG: 200 TABLET ORAL at 06:10

## 2019-08-16 RX ADMIN — OXYCODONE HYDROCHLORIDE 10 MG: 5 TABLET ORAL at 06:36

## 2019-08-16 RX ADMIN — POTASSIUM CHLORIDE 10 MEQ: 20 TABLET, EXTENDED RELEASE ORAL at 06:10

## 2019-08-16 RX ADMIN — ROSUVASTATIN CALCIUM 40 MG: 20 TABLET, FILM COATED ORAL at 06:09

## 2019-08-16 RX ADMIN — LISINOPRIL 2.5 MG: 2.5 TABLET ORAL at 06:09

## 2019-08-16 RX ADMIN — ALBUTEROL SULFATE 2 PUFF: 90 AEROSOL, METERED RESPIRATORY (INHALATION) at 18:57

## 2019-08-16 RX ADMIN — OXYCODONE HYDROCHLORIDE 10 MG: 5 TABLET ORAL at 23:48

## 2019-08-16 RX ADMIN — TIOTROPIUM BROMIDE 1 CAPSULE: 18 CAPSULE ORAL; RESPIRATORY (INHALATION) at 06:09

## 2019-08-16 RX ADMIN — IPRATROPIUM BROMIDE AND ALBUTEROL SULFATE 3 ML: .5; 3 SOLUTION RESPIRATORY (INHALATION) at 14:39

## 2019-08-16 RX ADMIN — METOPROLOL SUCCINATE 25 MG: 25 TABLET, EXTENDED RELEASE ORAL at 06:09

## 2019-08-16 ASSESSMENT — ENCOUNTER SYMPTOMS
NECK PAIN: 0
NEUROLOGICAL NEGATIVE: 1
VOMITING: 0
MYALGIAS: 0
ORTHOPNEA: 0
HEARTBURN: 0
PALPITATIONS: 0
SHORTNESS OF BREATH: 0
EYES NEGATIVE: 1
SPUTUM PRODUCTION: 0
DIARRHEA: 0
CONSTIPATION: 0
NAUSEA: 0
BLOOD IN STOOL: 0
COUGH: 0
FEVER: 0
PSYCHIATRIC NEGATIVE: 1
CHILLS: 0
HEMOPTYSIS: 0
ABDOMINAL PAIN: 0
WEIGHT LOSS: 0
CLAUDICATION: 0
BACK PAIN: 0

## 2019-08-16 NOTE — PROGRESS NOTES
Assumed care of pt, discussed plan of care. A&Ox4. On 2L O2 via NC, tolerates well. Diminished throughout. Complains of 4/10 pain, provided with rest and repositioning. Last BM, 8/14. Continent of bowel, bladder. Use of urinal. Assist x2-3. On regular diet. NPO at midnight. IV, CDI, running vanco. Fall precautions in place; bed lowest position, treaded socks at bedside, call light within reach. All needs met at this time.

## 2019-08-16 NOTE — PROGRESS NOTES
"Pharmacy Kinetics 53 y.o. male on vancomycin day # 1  8/15/2019    Currently Dose: Vancomycin 2000 mg iv q24hr (~15 mg/kg/dose)  Received Load Dose: Yes    Indication for Treatment: cellulitis/bacteremia rule-out  ID Service Following: No    Pertinent history per medical record: Admitted on 8/14/2019 for concerning left leg hematoma. New concerns for cellulitis and bacteremia rule-out by admit provider.    Other antibiotics: none    Allergies: Cillins [penicillins]; Erythromycin; Shellfish allergy; and Metoprolol     List concerns for accumulation of vancomycin: age 53, CHF, abnormal LFT's, electrolyte derangement    Pertinent cultures to date:   Results     Procedure Component Value Units Date/Time    BLOOD CULTURE [[id number]]  (Abnormal) Collected:  08/15/19 0213    Order Status:  Completed Specimen:  Blood from Peripheral Updated:  08/15/19 1743     Significant Indicator POS     Source BLD     Site PERIPHERAL     Culture Result Growth detected by Bactec instrument. 08/15/2019  17:43  Gram Stain: Gram positive rods.      Narrative:       Per Hospital Policy: Only change Specimen Src: to \"Line\" if  specified by physician order.  Right AC    BLOOD CULTURE [[id number]] Collected:  08/15/19 0207    Order Status:  Completed Specimen:  Blood from Peripheral Updated:  08/15/19 0226    Narrative:       Per Hospital Policy: Only change Specimen Src: to \"Line\" if  specified by physician order.        MRSA nares swab if pneumonia is a concern (ordered/positive/negative/n-a): n/a    Recent Labs     08/15/19  0056   WBC 25.7*   NEUTSPOLYS 85.30*     Recent Labs     08/15/19  0056   BUN 26*   CREATININE 1.08   ALBUMIN 3.7     Intake/Output Summary (Last 24 hours) at 8/15/2019 1934  Last data filed at 8/15/2019 1530  Gross per 24 hour   Intake 930 ml   Output 300 ml   Net 630 ml      /70   Pulse 69   Temp 36.2 °C (97.1 °F) (Temporal)   Resp 18   Ht 1.956 m (6' 5\")   Wt (!) 136.5 kg (301 lb)   SpO2 92%  Temp (24hrs), " Av.5 °C (97.7 °F), Min:36.1 °C (97 °F), Max:37.1 °C (98.7 °F)    Estimated Creatinine Clearance: 118.2 mL/min (by C-G formula based on SCr of 1.08 mg/dL).    A/P   1. Vancomycin dose change: not indicated   2. Next vancomycin level: ~1-2 days (not ordered)  3. Goal trough: 12-16 mcg/mL (pending clinical findings)  4. Comments: VS stable. Afebrile. WBC elevated. CrCl ~118 mL/min and likely inaccurate due to body habitus. Microbiology pending. Would consider additional antimicrobial coverage to complement vancomycin (e.g. Augmentin or Zosyn). Would consider prompt de-escalation as able pending clinical course. Concerns for accumulation of vancomycin noted. Likely to order vancomycin trough in ~1-2 days. BMP with AM labs. Pharmacy will continue to follow.    Jin Vo, PharmD

## 2019-08-16 NOTE — PROGRESS NOTES
Patient refused bed alarm, educated regarding importance.  Education reinforced by MIN Hyde.  Patient continues to refuse. Notified Charge MIN Blank.

## 2019-08-16 NOTE — RESPIRATORY CARE
COPD EDUCATION by COPD CLINICAL EDUCATOR  8/16/2019 at 3:54 PM by Gila Patten     Patient interviewed by COPD education team. Patient refused COPD program at this time.

## 2019-08-16 NOTE — PROGRESS NOTES
3 pages placed to R orange team regarding blood culture positive for gram positive rods with no response.     Critical lab communicated to Leandro Santos MD at 1917.

## 2019-08-16 NOTE — HEART FAILURE PROGRAM
"Cardiovascular Nurse Navigator () Advanced Heart Failure Program Inpatient Progress Note:    Per current Resident notes this patient's HFpEF, Improved is not acutely decompensated. Therefore, a seven day HF f/u appt is not currently indicated. Nor does the HF discharge checklist need to be used.    Patient is being primarily treated for traumatic hematoma of LLE.    Prior echo shows: improvement of EF from 25% to 55%. He last saw Dr. Bedoya at the HF clinic in May of this year.    Should clinical status change to acute HF, patient will require a seven calendar day f/u appt which can be achieved by placing a \"schedule heart failure follow up appointment\" order per protocol or by calling the hospital schedulers at 2077.      Respectfully request that problems list be updated to reflect that the HF is chronic and not acutely decompensated so that staff are not confused about necessary interventions and patient does not receive a seven day HF follow up appointment which should be saved for HF patients who had exacerbations.     Thank you and please call with questions or concerns: DILIP Vazquez, RN, CHFN    "

## 2019-08-16 NOTE — PROGRESS NOTES
Internal Medicine Interval Note  Note Author: Evelyn Mtz M.D.     Name Joshua Medrano     1965   Age/Sex 53 y.o. male   MRN [id number]   Code Status Full     After 5PM or if no immediate response to page, please call for cross-coverage  Attending/Team: Dr. Mccurdy/Tomas See Patient List for primary contact information  Call [phone] to page    1st Call - Day Intern (R1):   Tom 2nd Call - Day Sr. Resident (R2/R3):   Smith       Reason for interval visit  (Principal Problem)   Left lower extremity hematoma    Interval Problem Daily Status Update  (24 hours, problem oriented, brief subjective history, new lab/imaging data pertinent to that problem)   Blood cultures with gram positive rods on gram stain overnight. Initially started on vancomycin, but result thought to be a contaminant. Abx discontinued. Left calf looks mildly increased in size, measured 39 cm at largest point.  Left lower extremity remains erythematous, edematous, warm. Signals achieved on doppler this am per nursing.  INR subtherapeutic today, as intended.      Review of Systems   Constitutional: Negative for chills, fever, malaise/fatigue and weight loss.   HENT: Negative.    Eyes: Negative.    Respiratory: Negative for cough, hemoptysis, sputum production and shortness of breath.    Cardiovascular: Positive for leg swelling (Left ). Negative for chest pain, palpitations, orthopnea and claudication.   Gastrointestinal: Negative for abdominal pain, blood in stool, constipation, diarrhea, heartburn, nausea and vomiting.   Musculoskeletal: Positive for joint pain (Left LE ). Negative for back pain, myalgias and neck pain.   Skin: Positive for itching (BLLE) and rash (BLLE).   Neurological: Negative.    Psychiatric/Behavioral: Negative.        Disposition/Barriers to discharge:   Left leg hematoma need evacuation     Consultants/Specialty  Ortho surgery  PCP: Nam Le M.D.      Quality Measures  Quality-Core Measures    Reviewed items::  EKG reviewed, Labs reviewed, Medications reviewed and Radiology images reviewed  Ceballos catheter::  No Ceballos  DVT prophylaxis pharmacological::  Contraindicated - High bleeding risk      Physical Exam     Vitals:    08/15/19 1920 08/15/19 2119 08/16/19 0410 08/16/19 0735   BP: 120/70  124/69 120/71   Pulse: 69 70 72 70   Resp: 18 18 18 18   Temp: 36.2 °C (97.1 °F)  36.8 °C (98.3 °F) 36.1 °C (97 °F)   TempSrc: Temporal  Temporal Temporal   SpO2: 92%  94% 90%   Weight:       Height:         Body mass index is 35.69 kg/m².   Oxygen Therapy:  Pulse Oximetry: 90 %, O2 (LPM): 3, O2 Delivery: Silicone Nasal Cannula    Physical Exam   Constitutional: He is oriented to person, place, and time. No distress.   HENT:   Head: Normocephalic and atraumatic.   Eyes: Pupils are equal, round, and reactive to light. Conjunctivae and EOM are normal.   Neck: Normal range of motion. Neck supple.   Cardiovascular: Normal rate, regular rhythm, normal heart sounds and intact distal pulses.   No murmur heard.  Pulmonary/Chest: Effort normal and breath sounds normal. No respiratory distress. He has no wheezes. He has no rales. He exhibits no tenderness.   Abdominal: Soft. Bowel sounds are normal. He exhibits no distension. There is no tenderness.   Musculoskeletal: Normal range of motion. He exhibits edema (left lower extremity) and tenderness.   Neurological: He is alert and oriented to person, place, and time. No cranial nerve deficit. Gait normal. Coordination normal.   Skin: Skin is warm and dry. He is not diaphoretic. There is erythema (left lower extremity ).   Psychiatric: Mood and affect normal.       Assessment/Plan     * Traumatic hematoma of left lower leg  Assessment & Plan  Pt was on warfarin with supratherapeutic INR 4.55 on admission. Initial concern for compartment syndrome but not present per orthopedic surgeon, recommended conservative measures and would evacuate hematoma as soon as pt's INR is reversed.  He received Vit-K and FFP.    Plan  Repeat INR tomorrow   Neurovascular checks q6h  Hot and cold compress with leg elevation.   Pain control: iv Morphine PRN and other pain regimen   Orthopedic surgery on board and following    Heart failure with preserved ejection fraction, borderline, class IV (HCC)- (present on admission)  Assessment & Plan  HF is chronic and not acutely decompensated. Stable. Last EF is 55%, pt has mild concentric left ventricular hypertrophy, enlarged right atrium and severely dilated right ventricle. Extremities with strong pulse and well perfused.    Plan  Continue lisinopril, furosemide, metoprolol, rosuvastatin, spironolactone    CAD (coronary artery disease)- (present on admission)  Assessment & Plan  S/p CABG x2 (LIMA to LAD and SVG to OM), on Statin, lisinopril.    Plan  Continue home medications    Chronic obstructive pulmonary disease with acute exacerbation (HCC)- (present on admission)  Assessment & Plan  Stable. Hct 59.2 On Spiriva, albuterol, and duonebs.    Plan  Continue home spiriva, albuterol and duonebs    Left leg swelling- (present on admission)  Assessment & Plan  Questionable diagnosis of cellulitis. WBC ct is 25.7, on exam pt's leg looked erythematous and tender to touch, he was started on ceftriaxone in the ED, which was discontinued on 8/15/19. That evening blood cultures with gram positive rods on gram stain. Initially started on vancomycin, but result thought to be a contaminant. Abx discontinued.     Plan  Discontinued antibiotics    Paroxysmal atrial fibrillation (HCC)- (present on admission)  Assessment & Plan  Stable on amiodarone.    Plan  Continue home amiodarone

## 2019-08-17 LAB
ALBUMIN SERPL BCP-MCNC: 3.2 G/DL (ref 3.2–4.9)
ALBUMIN/GLOB SERPL: 0.8 G/DL
ALP SERPL-CCNC: 132 U/L (ref 30–99)
ALT SERPL-CCNC: 15 U/L (ref 2–50)
ANION GAP SERPL CALC-SCNC: 9 MMOL/L (ref 0–11.9)
AST SERPL-CCNC: 21 U/L (ref 12–45)
BASOPHILS # BLD AUTO: 1.1 % (ref 0–1.8)
BASOPHILS # BLD: 0.21 K/UL (ref 0–0.12)
BILIRUB SERPL-MCNC: 0.9 MG/DL (ref 0.1–1.5)
BUN SERPL-MCNC: 26 MG/DL (ref 8–22)
CALCIUM SERPL-MCNC: 9 MG/DL (ref 8.5–10.5)
CHLORIDE SERPL-SCNC: 97 MMOL/L (ref 96–112)
CO2 SERPL-SCNC: 24 MMOL/L (ref 20–33)
CREAT SERPL-MCNC: 1.05 MG/DL (ref 0.5–1.4)
EOSINOPHIL # BLD AUTO: 0.58 K/UL (ref 0–0.51)
EOSINOPHIL NFR BLD: 3.1 % (ref 0–6.9)
ERYTHROCYTE [DISTWIDTH] IN BLOOD BY AUTOMATED COUNT: 77.3 FL (ref 35.9–50)
GLOBULIN SER CALC-MCNC: 4.2 G/DL (ref 1.9–3.5)
GLUCOSE SERPL-MCNC: 194 MG/DL (ref 65–99)
HCT VFR BLD AUTO: 51.1 % (ref 42–52)
HGB BLD-MCNC: 15.6 G/DL (ref 14–18)
IMM GRANULOCYTES # BLD AUTO: 0.29 K/UL (ref 0–0.11)
IMM GRANULOCYTES NFR BLD AUTO: 1.5 % (ref 0–0.9)
INR PPP: 1.35 (ref 0.87–1.13)
LYMPHOCYTES # BLD AUTO: 0.65 K/UL (ref 1–4.8)
LYMPHOCYTES NFR BLD: 3.4 % (ref 22–41)
MCH RBC QN AUTO: 24.7 PG (ref 27–33)
MCHC RBC AUTO-ENTMCNC: 30.5 G/DL (ref 33.7–35.3)
MCV RBC AUTO: 80.9 FL (ref 81.4–97.8)
MONOCYTES # BLD AUTO: 1.41 K/UL (ref 0–0.85)
MONOCYTES NFR BLD AUTO: 7.4 % (ref 0–13.4)
MORPHOLOGY BLD-IMP: NORMAL
NEUTROPHILS # BLD AUTO: 15.83 K/UL (ref 1.82–7.42)
NEUTROPHILS NFR BLD: 83.5 % (ref 44–72)
NRBC # BLD AUTO: 0 K/UL
NRBC BLD-RTO: 0 /100 WBC
PLATELET # BLD AUTO: 276 K/UL (ref 164–446)
PMV BLD AUTO: 9.4 FL (ref 9–12.9)
POTASSIUM SERPL-SCNC: 4.4 MMOL/L (ref 3.6–5.5)
PROT SERPL-MCNC: 7.4 G/DL (ref 6–8.2)
PROTHROMBIN TIME: 17 SEC (ref 12–14.6)
RBC # BLD AUTO: 6.32 M/UL (ref 4.7–6.1)
SODIUM SERPL-SCNC: 130 MMOL/L (ref 135–145)
WBC # BLD AUTO: 19 K/UL (ref 4.8–10.8)

## 2019-08-17 PROCEDURE — 85610 PROTHROMBIN TIME: CPT

## 2019-08-17 PROCEDURE — 94760 N-INVAS EAR/PLS OXIMETRY 1: CPT

## 2019-08-17 PROCEDURE — 700102 HCHG RX REV CODE 250 W/ 637 OVERRIDE(OP): Performed by: STUDENT IN AN ORGANIZED HEALTH CARE EDUCATION/TRAINING PROGRAM

## 2019-08-17 PROCEDURE — 770006 HCHG ROOM/CARE - MED/SURG/GYN SEMI*

## 2019-08-17 PROCEDURE — 36415 COLL VENOUS BLD VENIPUNCTURE: CPT

## 2019-08-17 PROCEDURE — 700101 HCHG RX REV CODE 250: Performed by: INTERNAL MEDICINE

## 2019-08-17 PROCEDURE — A9270 NON-COVERED ITEM OR SERVICE: HCPCS | Performed by: STUDENT IN AN ORGANIZED HEALTH CARE EDUCATION/TRAINING PROGRAM

## 2019-08-17 PROCEDURE — 85025 COMPLETE CBC W/AUTO DIFF WBC: CPT

## 2019-08-17 PROCEDURE — 700102 HCHG RX REV CODE 250 W/ 637 OVERRIDE(OP): Performed by: INTERNAL MEDICINE

## 2019-08-17 PROCEDURE — 99232 SBSQ HOSP IP/OBS MODERATE 35: CPT | Mod: GC | Performed by: INTERNAL MEDICINE

## 2019-08-17 PROCEDURE — A9270 NON-COVERED ITEM OR SERVICE: HCPCS | Performed by: INTERNAL MEDICINE

## 2019-08-17 PROCEDURE — 80053 COMPREHEN METABOLIC PANEL: CPT

## 2019-08-17 PROCEDURE — 94640 AIRWAY INHALATION TREATMENT: CPT

## 2019-08-17 RX ADMIN — OXYCODONE HYDROCHLORIDE 10 MG: 5 TABLET ORAL at 10:37

## 2019-08-17 RX ADMIN — ALBUTEROL SULFATE 2 PUFF: 90 AEROSOL, METERED RESPIRATORY (INHALATION) at 15:15

## 2019-08-17 RX ADMIN — FERROUS SULFATE TAB 325 MG (65 MG ELEMENTAL FE) 325 MG: 325 (65 FE) TAB at 07:59

## 2019-08-17 RX ADMIN — ROSUVASTATIN CALCIUM 40 MG: 20 TABLET, FILM COATED ORAL at 05:21

## 2019-08-17 RX ADMIN — POTASSIUM CHLORIDE 10 MEQ: 20 TABLET, EXTENDED RELEASE ORAL at 05:21

## 2019-08-17 RX ADMIN — SPIRONOLACTONE 25 MG: 25 TABLET ORAL at 05:21

## 2019-08-17 RX ADMIN — LISINOPRIL 2.5 MG: 2.5 TABLET ORAL at 05:22

## 2019-08-17 RX ADMIN — AMIODARONE HYDROCHLORIDE 200 MG: 200 TABLET ORAL at 05:21

## 2019-08-17 RX ADMIN — OXYCODONE HYDROCHLORIDE 10 MG: 5 TABLET ORAL at 05:21

## 2019-08-17 RX ADMIN — ALBUTEROL SULFATE 2 PUFF: 90 AEROSOL, METERED RESPIRATORY (INHALATION) at 00:04

## 2019-08-17 RX ADMIN — OXYCODONE HYDROCHLORIDE 10 MG: 5 TABLET ORAL at 16:11

## 2019-08-17 RX ADMIN — IPRATROPIUM BROMIDE AND ALBUTEROL SULFATE 3 ML: .5; 3 SOLUTION RESPIRATORY (INHALATION) at 03:59

## 2019-08-17 RX ADMIN — TIOTROPIUM BROMIDE 1 CAPSULE: 18 CAPSULE ORAL; RESPIRATORY (INHALATION) at 05:20

## 2019-08-17 RX ADMIN — METOPROLOL SUCCINATE 25 MG: 25 TABLET, EXTENDED RELEASE ORAL at 05:21

## 2019-08-17 RX ADMIN — FUROSEMIDE 40 MG: 40 TABLET ORAL at 05:21

## 2019-08-17 RX ADMIN — IPRATROPIUM BROMIDE AND ALBUTEROL SULFATE 3 ML: .5; 3 SOLUTION RESPIRATORY (INHALATION) at 19:17

## 2019-08-17 RX ADMIN — OXYCODONE HYDROCHLORIDE 10 MG: 5 TABLET ORAL at 21:44

## 2019-08-17 ASSESSMENT — ENCOUNTER SYMPTOMS
VOMITING: 0
BLOOD IN STOOL: 0
SHORTNESS OF BREATH: 0
BACK PAIN: 0
PSYCHIATRIC NEGATIVE: 1
HEMOPTYSIS: 0
CLAUDICATION: 0
NEUROLOGICAL NEGATIVE: 1
DIARRHEA: 0
CHILLS: 0
CONSTIPATION: 0
NAUSEA: 0
HEARTBURN: 0
PALPITATIONS: 0
ORTHOPNEA: 0
COUGH: 0
MYALGIAS: 0
FEVER: 0
ABDOMINAL PAIN: 0
EYES NEGATIVE: 1
SPUTUM PRODUCTION: 0
NECK PAIN: 0
WEIGHT LOSS: 0

## 2019-08-17 NOTE — PROGRESS NOTES
Received report and assumed care. Pt is A&Ox4. POC discussed. Denies pain or discomfort at this time. All needs met. Safety precautions and hourly rounding in place.     2200: pt refuses bed alarm and yellow fall risk armband. Education provided, pt verbalizes understanding and continues to refuse.

## 2019-08-17 NOTE — PROGRESS NOTES
Pt alert and oriented. Non weight bearing to left leg. Medicated for pain as needed per MAR. Tolerating food and medications.

## 2019-08-17 NOTE — PROGRESS NOTES
Internal Medicine Interval Note  Note Author: Leandro Santos M.D.     Name Joshua Medrano     1965   Age/Sex 53 y.o. male   MRN [id number]   Code Status Full     After 5PM or if no immediate response to page, please call for cross-coverage  Attending/Team: Dr. Mccurdy/Tomas See Patient List for primary contact information  Call [phone] to page    1st Call - Day Intern (R1):   Tom 2nd Call - Day Sr. Resident (R2/R3):   Smith       Reason for interval visit  (Principal Problem)   Left lower extremity hematoma    Interval Problem Daily Status Update  (24 hours, problem oriented, brief subjective history, new lab/imaging data pertinent to that problem)   No acute events overnight. Pt was in mild distress d/t LLE swelling and pain with movement of leg.   Left calf looks erythmatous, edematous and tender to touch, measured 49 cm at largest point. Pt able to move legs, wiggle toes, intact sensations, DP pulses palpable and good capillary refill at left great toe   As per nurse pt denied to draw lab early morning, but asked me stay there while phlebotomist drawing labs.   INR is subtherapeutic 1.35. Will cont to hold anticoagulation for now and watch left limb closely.       Review of Systems   Constitutional: Negative for chills, fever, malaise/fatigue and weight loss.   HENT: Negative.    Eyes: Negative.    Respiratory: Negative for cough, hemoptysis, sputum production and shortness of breath.    Cardiovascular: Positive for leg swelling (Left ). Negative for chest pain, palpitations, orthopnea and claudication.   Gastrointestinal: Negative for abdominal pain, blood in stool, constipation, diarrhea, heartburn, nausea and vomiting.   Musculoskeletal: Positive for joint pain (Left LE ). Negative for back pain, myalgias and neck pain.   Skin: Positive for itching (BLLE) and rash (BLLE).   Neurological: Negative.    Psychiatric/Behavioral: Negative.        Disposition/Barriers to discharge:   Left leg  hematoma need evacuation     Consultants/Specialty  Ortho surgery  PCP: aNm Le M.D.      Quality Measures  Quality-Core Measures   Reviewed items::  EKG reviewed, Labs reviewed, Medications reviewed and Radiology images reviewed  Ceballos catheter::  No Ceballos  DVT prophylaxis pharmacological::  Contraindicated - High bleeding risk      Physical Exam     Vitals:    08/16/19 1555 08/16/19 1935 08/17/19 0350 08/17/19 0358   BP: 108/82 104/65 112/64    Pulse: 77 76 77 72   Resp: 18 18 18 (!) 24   Temp: 36.6 °C (97.9 °F) 36.5 °C (97.7 °F) 36.4 °C (97.6 °F)    TempSrc: Temporal Temporal Temporal    SpO2: 95% 94% 97%    Weight:       Height:         Body mass index is 35.69 kg/m².   Oxygen Therapy:  Pulse Oximetry: 97 %, O2 (LPM): 3, O2 Delivery: Silicone Nasal Cannula    Physical Exam   Constitutional: He is oriented to person, place, and time. No distress.   HENT:   Head: Normocephalic and atraumatic.   Eyes: Pupils are equal, round, and reactive to light. Conjunctivae and EOM are normal.   Neck: Normal range of motion. Neck supple.   Cardiovascular: Normal rate, regular rhythm, normal heart sounds and intact distal pulses.   No murmur heard.  Pulmonary/Chest: Effort normal and breath sounds normal. No respiratory distress. He has no wheezes. He has no rales. He exhibits no tenderness.   Abdominal: Soft. Bowel sounds are normal. He exhibits no distension. There is no tenderness.   Musculoskeletal: Normal range of motion. He exhibits edema (left lower extremity) and tenderness.   Neurological: He is alert and oriented to person, place, and time. No cranial nerve deficit. Gait normal. Coordination normal.   Skin: Skin is warm and dry. He is not diaphoretic. There is erythema (left lower extremity ).   Psychiatric: Mood and affect normal.       Assessment/Plan     * Traumatic hematoma of left lower leg  Assessment & Plan  Pt was on warfarin with supratherapeutic INR 4.55 on admission. Initial concern for compartment  syndrome but not present per orthopedic surgeon, recommended conservative measures and would evacuate hematoma as soon as pt's INR is reversed. He received Vit-K and FFP.  INR is subtherapeutic 1.35. Will cont to hold anticoagulation for now and watch left limb closely    Plan  Repeat INR tomorrow   Neurovascular checks q6h  Hot and cold compress with leg elevation.   Pain control: iv Morphine PRN and other pain regimen   Orthopedic surgery on board and following    Heart failure with preserved ejection fraction, borderline, class IV (HCC)- (present on admission)  Assessment & Plan  HF is chronic and not acutely decompensated. Stable. Last EF is 55%, pt has mild concentric left ventricular hypertrophy, enlarged right atrium and severely dilated right ventricle. Extremities with strong pulse and well perfused.    Plan  Continue lisinopril, furosemide, metoprolol, rosuvastatin, spironolactone    CAD (coronary artery disease)- (present on admission)  Assessment & Plan  S/p CABG x2 (LIMA to LAD and SVG to OM), on Statin, lisinopril.    Plan  Continue home medications    Chronic obstructive pulmonary disease with acute exacerbation (HCC)- (present on admission)  Assessment & Plan  Stable. Hct 59.2 On Spiriva, albuterol, and duonebs.    Plan  Continue home spiriva, albuterol and duonebs    Left leg swelling- (present on admission)  Assessment & Plan  Questionable diagnosis of cellulitis. WBC ct is 25.7, on exam pt's leg looked erythematous and tender to touch, he was started on ceftriaxone in the ED, which was discontinued on 8/15/19. That evening blood cultures with gram positive rods on gram stain. Initially started on vancomycin, but result thought to be a contaminant. Abx discontinued.     Plan  Discontinued antibiotics    Paroxysmal atrial fibrillation (HCC)- (present on admission)  Assessment & Plan  Stable on amiodarone.    Plan  Continue home amiodarone

## 2019-08-17 NOTE — CARE PLAN
Problem: Safety  Goal: Will remain free from injury  Outcome: PROGRESSING AS EXPECTED  Patient refuses bed alarm despite education. Educated pt to call prior to getting out of bed.      Problem: Pain Management  Goal: Pain level will decrease to patient's comfort goal  Outcome: PROGRESSING AS EXPECTED  Patient complains of pain 8/10 in his left leg. Medications administered per MAR. Heat/cold packs encouraged.

## 2019-08-18 LAB
ANION GAP SERPL CALC-SCNC: 7 MMOL/L (ref 0–11.9)
ANISOCYTOSIS BLD QL SMEAR: ABNORMAL
BASOPHILS # BLD AUTO: 1 % (ref 0–1.8)
BASOPHILS # BLD: 0.21 K/UL (ref 0–0.12)
BUN SERPL-MCNC: 27 MG/DL (ref 8–22)
CALCIUM SERPL-MCNC: 9.1 MG/DL (ref 8.5–10.5)
CHLORIDE SERPL-SCNC: 97 MMOL/L (ref 96–112)
CO2 SERPL-SCNC: 25 MMOL/L (ref 20–33)
COMMENT 1642: NORMAL
CREAT SERPL-MCNC: 1.01 MG/DL (ref 0.5–1.4)
EOSINOPHIL # BLD AUTO: 0.57 K/UL (ref 0–0.51)
EOSINOPHIL NFR BLD: 2.8 % (ref 0–6.9)
ERYTHROCYTE [DISTWIDTH] IN BLOOD BY AUTOMATED COUNT: 77.3 FL (ref 35.9–50)
GLUCOSE SERPL-MCNC: 204 MG/DL (ref 65–99)
HCT VFR BLD AUTO: 50.8 % (ref 42–52)
HGB BLD-MCNC: 15.1 G/DL (ref 14–18)
IMM GRANULOCYTES # BLD AUTO: 0.34 K/UL (ref 0–0.11)
IMM GRANULOCYTES NFR BLD AUTO: 1.6 % (ref 0–0.9)
INR PPP: 1.36 (ref 0.87–1.13)
LYMPHOCYTES # BLD AUTO: 0.78 K/UL (ref 1–4.8)
LYMPHOCYTES NFR BLD: 3.8 % (ref 22–41)
MACROCYTES BLD QL SMEAR: ABNORMAL
MCH RBC QN AUTO: 24.3 PG (ref 27–33)
MCHC RBC AUTO-ENTMCNC: 29.7 G/DL (ref 33.7–35.3)
MCV RBC AUTO: 81.7 FL (ref 81.4–97.8)
MONOCYTES # BLD AUTO: 1.75 K/UL (ref 0–0.85)
MONOCYTES NFR BLD AUTO: 8.5 % (ref 0–13.4)
MORPHOLOGY BLD-IMP: NORMAL
NEUTROPHILS # BLD AUTO: 16.96 K/UL (ref 1.82–7.42)
NEUTROPHILS NFR BLD: 82.3 % (ref 44–72)
NRBC # BLD AUTO: 0 K/UL
NRBC BLD-RTO: 0 /100 WBC
PLATELET # BLD AUTO: 315 K/UL (ref 164–446)
PMV BLD AUTO: 9.2 FL (ref 9–12.9)
POLYCHROMASIA BLD QL SMEAR: NORMAL
POTASSIUM SERPL-SCNC: 4.4 MMOL/L (ref 3.6–5.5)
PROTHROMBIN TIME: 17.2 SEC (ref 12–14.6)
RBC # BLD AUTO: 6.22 M/UL (ref 4.7–6.1)
RBC BLD AUTO: PRESENT
SODIUM SERPL-SCNC: 129 MMOL/L (ref 135–145)
WBC # BLD AUTO: 20.6 K/UL (ref 4.8–10.8)

## 2019-08-18 PROCEDURE — 99232 SBSQ HOSP IP/OBS MODERATE 35: CPT | Mod: GC | Performed by: INTERNAL MEDICINE

## 2019-08-18 PROCEDURE — 36415 COLL VENOUS BLD VENIPUNCTURE: CPT

## 2019-08-18 PROCEDURE — 700101 HCHG RX REV CODE 250: Performed by: INTERNAL MEDICINE

## 2019-08-18 PROCEDURE — A9270 NON-COVERED ITEM OR SERVICE: HCPCS | Performed by: INTERNAL MEDICINE

## 2019-08-18 PROCEDURE — 85610 PROTHROMBIN TIME: CPT

## 2019-08-18 PROCEDURE — 770006 HCHG ROOM/CARE - MED/SURG/GYN SEMI*

## 2019-08-18 PROCEDURE — A9270 NON-COVERED ITEM OR SERVICE: HCPCS | Performed by: STUDENT IN AN ORGANIZED HEALTH CARE EDUCATION/TRAINING PROGRAM

## 2019-08-18 PROCEDURE — 700102 HCHG RX REV CODE 250 W/ 637 OVERRIDE(OP): Performed by: STUDENT IN AN ORGANIZED HEALTH CARE EDUCATION/TRAINING PROGRAM

## 2019-08-18 PROCEDURE — 94760 N-INVAS EAR/PLS OXIMETRY 1: CPT

## 2019-08-18 PROCEDURE — 700102 HCHG RX REV CODE 250 W/ 637 OVERRIDE(OP): Performed by: INTERNAL MEDICINE

## 2019-08-18 PROCEDURE — 307059 PAD,EAR PROTECTOR: Performed by: STUDENT IN AN ORGANIZED HEALTH CARE EDUCATION/TRAINING PROGRAM

## 2019-08-18 PROCEDURE — 85025 COMPLETE CBC W/AUTO DIFF WBC: CPT

## 2019-08-18 PROCEDURE — 80048 BASIC METABOLIC PNL TOTAL CA: CPT

## 2019-08-18 PROCEDURE — 94640 AIRWAY INHALATION TREATMENT: CPT

## 2019-08-18 RX ORDER — CALCIUM CARBONATE 500 MG/1
500 TABLET, CHEWABLE ORAL DAILY
Status: DISCONTINUED | OUTPATIENT
Start: 2019-08-18 | End: 2019-08-24 | Stop reason: HOSPADM

## 2019-08-18 RX ADMIN — IPRATROPIUM BROMIDE AND ALBUTEROL SULFATE 3 ML: .5; 3 SOLUTION RESPIRATORY (INHALATION) at 19:23

## 2019-08-18 RX ADMIN — SPIRONOLACTONE 25 MG: 25 TABLET ORAL at 04:59

## 2019-08-18 RX ADMIN — OXYCODONE HYDROCHLORIDE 10 MG: 5 TABLET ORAL at 10:36

## 2019-08-18 RX ADMIN — OXYCODONE HYDROCHLORIDE 10 MG: 5 TABLET ORAL at 06:09

## 2019-08-18 RX ADMIN — TIOTROPIUM BROMIDE 1 CAPSULE: 18 CAPSULE ORAL; RESPIRATORY (INHALATION) at 04:58

## 2019-08-18 RX ADMIN — METOPROLOL SUCCINATE 25 MG: 25 TABLET, EXTENDED RELEASE ORAL at 04:59

## 2019-08-18 RX ADMIN — ALBUTEROL SULFATE 2 PUFF: 90 AEROSOL, METERED RESPIRATORY (INHALATION) at 10:37

## 2019-08-18 RX ADMIN — POTASSIUM CHLORIDE 10 MEQ: 20 TABLET, EXTENDED RELEASE ORAL at 04:58

## 2019-08-18 RX ADMIN — LISINOPRIL 2.5 MG: 2.5 TABLET ORAL at 05:00

## 2019-08-18 RX ADMIN — FUROSEMIDE 40 MG: 40 TABLET ORAL at 05:00

## 2019-08-18 RX ADMIN — AMIODARONE HYDROCHLORIDE 200 MG: 200 TABLET ORAL at 04:59

## 2019-08-18 RX ADMIN — FERROUS SULFATE TAB 325 MG (65 MG ELEMENTAL FE) 325 MG: 325 (65 FE) TAB at 08:33

## 2019-08-18 RX ADMIN — OXYCODONE HYDROCHLORIDE 10 MG: 5 TABLET ORAL at 15:53

## 2019-08-18 RX ADMIN — OXYCODONE HYDROCHLORIDE 10 MG: 5 TABLET ORAL at 20:03

## 2019-08-18 RX ADMIN — ANTACID TABLETS 500 MG: 500 TABLET, CHEWABLE ORAL at 20:20

## 2019-08-18 RX ADMIN — ROSUVASTATIN CALCIUM 40 MG: 20 TABLET, FILM COATED ORAL at 04:58

## 2019-08-18 RX ADMIN — SENNOSIDES, DOCUSATE SODIUM 2 TABLET: 50; 8.6 TABLET, FILM COATED ORAL at 17:55

## 2019-08-18 RX ADMIN — ALBUTEROL SULFATE 2 PUFF: 90 AEROSOL, METERED RESPIRATORY (INHALATION) at 01:13

## 2019-08-18 RX ADMIN — IPRATROPIUM BROMIDE AND ALBUTEROL SULFATE 3 ML: .5; 3 SOLUTION RESPIRATORY (INHALATION) at 03:16

## 2019-08-18 ASSESSMENT — ENCOUNTER SYMPTOMS
PSYCHIATRIC NEGATIVE: 1
COUGH: 0
HEARTBURN: 0
MYALGIAS: 0
HEMOPTYSIS: 0
BACK PAIN: 0
NERVOUS/ANXIOUS: 0
SHORTNESS OF BREATH: 0
NEUROLOGICAL NEGATIVE: 1
ABDOMINAL PAIN: 0
NAUSEA: 0
VOMITING: 0
NECK PAIN: 0
FEVER: 0
FOCAL WEAKNESS: 0
WEIGHT LOSS: 0
BLURRED VISION: 0
MYALGIAS: 1
CONSTIPATION: 0
ORTHOPNEA: 0
SPUTUM PRODUCTION: 0
CLAUDICATION: 0
EYES NEGATIVE: 1
DEPRESSION: 0
PALPITATIONS: 0
DIARRHEA: 0
CHILLS: 0
BLOOD IN STOOL: 0

## 2019-08-18 NOTE — PROGRESS NOTES
Received pt. In bed awake, alert and orientedx4. Complaints of pain/discomfort at the time of assessment on L leg medicated per MAR. Fall prec in placed. Albuterol inhaler PRN for SOB. RT following with the treatment. Due meds given and tolerated. Needs attended.

## 2019-08-18 NOTE — PROGRESS NOTES
Internal Medicine Interval Note  Note Author: Leandro Santos M.D.     Name Joshua Medrano     1965   Age/Sex 53 y.o. male   MRN [id number]   Code Status Full     After 5PM or if no immediate response to page, please call for cross-coverage  Attending/Team: Dr. Mccurdy/Tomas See Patient List for primary contact information  Call [phone] to page    1st Call - Day Intern (R1):   Tom 2nd Call - Day Sr. Resident (R2/R3):   Smith       Reason for interval visit  (Principal Problem)   Left lower extremity hematoma    Interval Problem Daily Status Update  (24 hours, problem oriented, brief subjective history, new lab/imaging data pertinent to that problem)   Patient was sitting comfortable in bed. No acute events overnight. Pt denied any pain while resting and experience pain with leg movement.    Left calf looks same since it was yesterday erythmatous, edematous and tender to touch, measured 49 cm at largest point.   Pt attempted to stand yesterday but unable to bear weight due to pain.   Pt able to move legs, wiggle toes, intact sensations, DP pulses palpable and good capillary refill at left great toe  Nurse called that pt wife would like to know the plan so I went and spoke with her and explained about the treatment and plan.    Will communicate with surgery to reexamine the calf and will appreciate their input  INR is 1.35. Will cont to hold anticoagulation for now and watch left limb closely.       Review of Systems   Constitutional: Negative for chills, fever, malaise/fatigue and weight loss.   HENT: Negative.    Eyes: Negative.    Respiratory: Negative for cough, hemoptysis, sputum production and shortness of breath.    Cardiovascular: Positive for leg swelling (Left ). Negative for chest pain, palpitations, orthopnea and claudication.   Gastrointestinal: Negative for abdominal pain, blood in stool, constipation, diarrhea, heartburn, nausea and vomiting.   Musculoskeletal: Positive for joint  pain (Left LE ). Negative for back pain, myalgias and neck pain.   Skin: Positive for itching (BLLE) and rash (BLLE).   Neurological: Negative.    Psychiatric/Behavioral: Negative.        Disposition/Barriers to discharge:   Left leg hematoma need evacuation     Consultants/Specialty  Ortho surgery  PCP: Nam Le M.D.      Quality Measures  Quality-Core Measures   Reviewed items::  EKG reviewed, Labs reviewed, Medications reviewed and Radiology images reviewed  Ceballos catheter::  No Ceballos  DVT prophylaxis pharmacological::  Contraindicated - High bleeding risk      Physical Exam     Vitals:    08/17/19 1935 08/18/19 0316 08/18/19 0330 08/18/19 0800   BP: 116/60  116/61 (!) 97/54   Pulse: 81 75 80 75   Resp: 18 20 18 18   Temp: 36.8 °C (98.2 °F)  36.9 °C (98.5 °F) 36.8 °C (98.2 °F)   TempSrc: Temporal  Temporal Temporal   SpO2: 95% 96% 94% 93%   Weight:       Height:         Body mass index is 38.48 kg/m².   Oxygen Therapy:  Pulse Oximetry: 93 %, O2 (LPM): 2, O2 Delivery: Silicone Nasal Cannula    Physical Exam   Constitutional: He is oriented to person, place, and time. No distress.   HENT:   Head: Normocephalic and atraumatic.   Eyes: Pupils are equal, round, and reactive to light. Conjunctivae and EOM are normal.   Neck: Normal range of motion. Neck supple.   Cardiovascular: Normal rate, regular rhythm, normal heart sounds and intact distal pulses.   No murmur heard.  Pulmonary/Chest: Effort normal and breath sounds normal. No respiratory distress. He has no wheezes. He has no rales. He exhibits no tenderness.   Abdominal: Soft. Bowel sounds are normal. He exhibits no distension. There is no tenderness.   Musculoskeletal: Normal range of motion. He exhibits edema (left lower extremity) and tenderness.   Neurological: He is alert and oriented to person, place, and time. No cranial nerve deficit. Gait normal. Coordination normal.   Skin: Skin is warm and dry. He is not diaphoretic. There is erythema (left lower  extremity ).   Psychiatric: Mood and affect normal.       Assessment/Plan     * Traumatic hematoma of left lower leg  Assessment & Plan  Pt was on warfarin with supratherapeutic INR 4.55 on admission. Initial concern for compartment syndrome but not present per orthopedic surgeon, recommended conservative measures and would evacuate hematoma as soon as pt's INR is reversed. He received Vit-K and FFP.  INR is subtherapeutic 1.35. Will cont to hold anticoagulation for now and watch left limb closely    Plan  Repeat INR tomorrow   Neurovascular checks q6h  Hot and cold compress with leg elevation.   Pain control: iv Morphine PRN and other pain regimen   Orthopedic surgery on board and following    Heart failure with preserved ejection fraction, borderline, class IV (HCC)- (present on admission)  Assessment & Plan  HF is chronic and not acutely decompensated. Stable. Last EF is 55%, pt has mild concentric left ventricular hypertrophy, enlarged right atrium and severely dilated right ventricle. Extremities with strong pulse and well perfused.    Plan  Continue lisinopril, furosemide, metoprolol, rosuvastatin, spironolactone    CAD (coronary artery disease)- (present on admission)  Assessment & Plan  S/p CABG x2 (LIMA to LAD and SVG to OM), on Statin, lisinopril.    Plan  Continue home medications    Chronic obstructive pulmonary disease with acute exacerbation (HCC)- (present on admission)  Assessment & Plan  Stable. Hct 59.2 On Spiriva, albuterol, and duonebs.    Plan  Continue home spiriva, albuterol and duonebs    Left leg swelling- (present on admission)  Assessment & Plan  Questionable diagnosis of cellulitis. WBC ct is 25.7, on exam pt's leg looked erythematous and tender to touch, he was started on ceftriaxone in the ED, which was discontinued on 8/15/19. That evening blood cultures with gram positive rods on gram stain. Initially started on vancomycin, but result thought to be a contaminant. Abx discontinued.      Plan  Discontinued antibiotics    Paroxysmal atrial fibrillation (HCC)- (present on admission)  Assessment & Plan  Stable on amiodarone.    Plan  Continue home amiodarone

## 2019-08-18 NOTE — CARE PLAN
Problem: Knowledge Deficit  Goal: Knowledge of disease process/condition, treatment plan, diagnostic tests, and medications will improve  Outcome: PROGRESSING AS EXPECTED  POC discussed with the pt. Awaiting for labs to trend down.     Problem: Pain Management  Goal: Pain level will decrease to patient's comfort goal  Outcome: PROGRESSING AS EXPECTED   PRn pain medication given to alleviate pt. Pain    Problem: Respiratory:  Goal: Respiratory status will improve  Outcome: PROGRESSING AS EXPECTED  PRN albuterol on board, RT following for breathing treatments.

## 2019-08-18 NOTE — PROGRESS NOTES
Pt A&Ox4. Remained in bed throughout the shift. Attempted to stand at bedside this afternoon however pt unable to bear weight d/t pain. Unable to tolerate leg in dependent position off side of bed. Left foot elevated on pillow d/t increased swelling. Leg red and warm. Oxycodone given for pain. 3L oxygen on and chronic for patient. No bowel movement today and pt refusing stool softeners. Albuterol inhaler given as needed for SOB.

## 2019-08-18 NOTE — NON-PROVIDER
Internal Medicine Interval Note  Note Author: Haseeb Painter, Student     Name Joshua Medrano     1965   Age/Sex 53 y.o. male   MRN [id number]   Code Status Full     After 5PM or if no immediate response to page, please call for cross-coverage  Attending/Team: Kaz/Orange See Patient List for primary contact information  Call [phone] to page    1st Call - Day Intern (R1):   Tom 2nd Call - Day Sr. Resident (R2/R3):   Smith         Reason for interval visit  (Principal Problem)   Traumatic hematoma of left lower leg      Interval Problem Daily Status Update  (24 hours, problem oriented, brief subjective history, new lab/imaging data pertinent to that problem)   Patient was awake and sitting upright in bed this morning when I entered room. He was in no acute distress and pleasant on interview/exam. He reports his pain has decreased in his leg from yesterday although it is still slightly tender to palpation at the site of his calf muscle. He reports having tried to stand, but is still unable to bear weight on his left side due to pain. He was wiggling his toes the whole time I was there. Vitals are stable. O2 sat 94% on 2L. On exam, he had equal sensation at his feet bilaterally, cap refill was less than 2 seconds, and pulses were intact and equal bilaterally. His LLE was warm to the touch but not much more than the RLE. Labs show INR 1.36.     Review of Systems   Constitutional: Negative for chills and fever.   Eyes: Negative for blurred vision.   Respiratory: Negative for shortness of breath.    Cardiovascular: Positive for leg swelling. Negative for chest pain and palpitations.   Gastrointestinal: Negative for nausea and vomiting.   Musculoskeletal: Positive for myalgias (muscle tenderness with calf stretch).   Neurological: Negative for focal weakness.   Psychiatric/Behavioral: Negative for depression. The patient is not nervous/anxious.          Disposition/Barriers to discharge:   Inpatient  pending surgery recommendation time/place of hematoma evacuation    Consultants/Specialty  Josiah Brown, Orthopedics    PCP: Nam Le M.D.      Quality Measures  Quality-Core Measures   Reviewed items::  Labs reviewed, Medications reviewed and Radiology images reviewed          Physical Exam       Vitals:    08/17/19 1935 08/18/19 0316 08/18/19 0330 08/18/19 0800   BP: 116/60  116/61 (!) 97/54   Pulse: 81 75 80 75   Resp: 18 20 18 18   Temp: 36.8 °C (98.2 °F)  36.9 °C (98.5 °F) 36.8 °C (98.2 °F)   TempSrc: Temporal  Temporal Temporal   SpO2: 95% 96% 94% 93%   Weight:       Height:         Body mass index is 38.48 kg/m². Weight: (!) 147.2 kg (324 lb 8.3 oz)  Oxygen Therapy:  Pulse Oximetry: 93 %, O2 (LPM): 2, O2 Delivery: Silicone Nasal Cannula    Physical Exam   Constitutional: He is oriented to person, place, and time. He appears distressed.   HENT:   Head: Normocephalic and atraumatic.   Eyes: Pupils are equal, round, and reactive to light. EOM are normal.   Neck: Normal range of motion. Neck supple.   Cardiovascular: Normal rate, regular rhythm and normal heart sounds.   Pulmonary/Chest: Effort normal and breath sounds normal. No respiratory distress.   Abdominal: Soft. Bowel sounds are normal. He exhibits no distension. There is no tenderness.   Musculoskeletal:        Left lower leg: He exhibits tenderness and swelling (measured circumference = 49cm).        Legs:  Neurological: He is alert and oriented to person, place, and time.   Psychiatric: Mood and affect normal.           Assessment/Plan     Assessment & plan notes cannot be loaded without a specified hospital service.    1. Traumatic hematoma of left lower leg - patient was on warfarin with supratherapeutic INR 4.55 on admission, suspect trauma-induced hemorrhage due to supratherapeutic INR; initial concern for compartment syndrome; per ortho, not present; recommended non-surgical management until INR lowered; he received Vit K and FFP in the interim;  INR now 1.36; anticoagulation held for now  - Get surgery input on when to resume anticoagulation; if surgical intervention needed prior to discharge, or if it can be done on outpatient basis  - Encourage mobility, monitor ability  - Neurovascular checks q6h  - Alternate hot and cold compression with leg elevation  - For pain, oxycodone PO 5-10mg q4h PRN, morphine IV 2-4mg q4h PRN, tylenol 1000mg q8h PRN for baseline    2. Heart failure with preserved ejection fraction - (present on admission)   Chronic, not acutely decompensated. Last EF 55%, patient has mild concentric left ventricular hypertrophy, enlarged right atrium and severely dilated right ventricle. Currently, extremities with strong pulses and are well perfused.  - Continue home medications, including lisinopril, furosemide, metoprolol, rosuvastatin, sprionolactone    3. CAD - (present on admission)  S/p CABG x2 (LIMA to LAD and SVG to OM), on statin, lisinopril  - Continue home meds    4. COPD - (present on admission)  Stable. Hct 50.8 this morning. Currently on tiotropium, albuterol and Duoneb.  - Continue home meds    5. Left leg swelling - (present on admission)  Questionable cellulitis on admission. Then, WBC 25.7 and patient's leg looked erythematous and was tender on palpation. Started on ceftriaxone empirically in ED, but discontinued soon afterwards. That evening blood cultures showed gram positive rods, so patient was started on Vancomycin empirically. Results thought to be contaminant, so vanco was d/c'd as well.   - Discontinued antibiotics    6. Paroxysmal atrial fibrillation - (present on admission)   Stable on amiodarone.  - Continue amiodarone

## 2019-08-18 NOTE — PROGRESS NOTES
Pt remained in bed throughout the shift. Leg leg iced throughout the day. Mepilex applied to sacrum d/t blanchable redness. Oxycodone given for pain. Albuterol inhaler given for SOB. Plans to be NPO at midnight.

## 2019-08-19 LAB
ANION GAP SERPL CALC-SCNC: 6 MMOL/L (ref 0–11.9)
BASOPHILS # BLD AUTO: 1.1 % (ref 0–1.8)
BASOPHILS # BLD: 0.2 K/UL (ref 0–0.12)
BUN SERPL-MCNC: 26 MG/DL (ref 8–22)
CALCIUM SERPL-MCNC: 9.1 MG/DL (ref 8.5–10.5)
CHLORIDE SERPL-SCNC: 95 MMOL/L (ref 96–112)
CO2 SERPL-SCNC: 30 MMOL/L (ref 20–33)
CREAT SERPL-MCNC: 0.98 MG/DL (ref 0.5–1.4)
EOSINOPHIL # BLD AUTO: 0.44 K/UL (ref 0–0.51)
EOSINOPHIL NFR BLD: 2.3 % (ref 0–6.9)
ERYTHROCYTE [DISTWIDTH] IN BLOOD BY AUTOMATED COUNT: 75.7 FL (ref 35.9–50)
GLUCOSE SERPL-MCNC: 151 MG/DL (ref 65–99)
HCT VFR BLD AUTO: 48.7 % (ref 42–52)
HGB BLD-MCNC: 14.9 G/DL (ref 14–18)
IMM GRANULOCYTES # BLD AUTO: 0.37 K/UL (ref 0–0.11)
IMM GRANULOCYTES NFR BLD AUTO: 2 % (ref 0–0.9)
INR PPP: 1.29 (ref 0.87–1.13)
LYMPHOCYTES # BLD AUTO: 0.82 K/UL (ref 1–4.8)
LYMPHOCYTES NFR BLD: 4.3 % (ref 22–41)
MCH RBC QN AUTO: 25 PG (ref 27–33)
MCHC RBC AUTO-ENTMCNC: 30.6 G/DL (ref 33.7–35.3)
MCV RBC AUTO: 81.7 FL (ref 81.4–97.8)
MONOCYTES # BLD AUTO: 1.71 K/UL (ref 0–0.85)
MONOCYTES NFR BLD AUTO: 9 % (ref 0–13.4)
NEUTROPHILS # BLD AUTO: 15.42 K/UL (ref 1.82–7.42)
NEUTROPHILS NFR BLD: 81.3 % (ref 44–72)
NRBC # BLD AUTO: 0 K/UL
NRBC BLD-RTO: 0 /100 WBC
PLATELET # BLD AUTO: 321 K/UL (ref 164–446)
PMV BLD AUTO: 9.4 FL (ref 9–12.9)
POTASSIUM SERPL-SCNC: 4.5 MMOL/L (ref 3.6–5.5)
PROTHROMBIN TIME: 16.5 SEC (ref 12–14.6)
RBC # BLD AUTO: 5.96 M/UL (ref 4.7–6.1)
SODIUM SERPL-SCNC: 131 MMOL/L (ref 135–145)
WBC # BLD AUTO: 19 K/UL (ref 4.8–10.8)

## 2019-08-19 PROCEDURE — 80048 BASIC METABOLIC PNL TOTAL CA: CPT

## 2019-08-19 PROCEDURE — 700102 HCHG RX REV CODE 250 W/ 637 OVERRIDE(OP): Performed by: STUDENT IN AN ORGANIZED HEALTH CARE EDUCATION/TRAINING PROGRAM

## 2019-08-19 PROCEDURE — 99232 SBSQ HOSP IP/OBS MODERATE 35: CPT | Mod: GC | Performed by: INTERNAL MEDICINE

## 2019-08-19 PROCEDURE — 94640 AIRWAY INHALATION TREATMENT: CPT

## 2019-08-19 PROCEDURE — A9270 NON-COVERED ITEM OR SERVICE: HCPCS | Performed by: STUDENT IN AN ORGANIZED HEALTH CARE EDUCATION/TRAINING PROGRAM

## 2019-08-19 PROCEDURE — 36415 COLL VENOUS BLD VENIPUNCTURE: CPT

## 2019-08-19 PROCEDURE — 700111 HCHG RX REV CODE 636 W/ 250 OVERRIDE (IP): Performed by: STUDENT IN AN ORGANIZED HEALTH CARE EDUCATION/TRAINING PROGRAM

## 2019-08-19 PROCEDURE — A9270 NON-COVERED ITEM OR SERVICE: HCPCS | Performed by: INTERNAL MEDICINE

## 2019-08-19 PROCEDURE — 85610 PROTHROMBIN TIME: CPT

## 2019-08-19 PROCEDURE — 700101 HCHG RX REV CODE 250: Performed by: INTERNAL MEDICINE

## 2019-08-19 PROCEDURE — 770006 HCHG ROOM/CARE - MED/SURG/GYN SEMI*

## 2019-08-19 PROCEDURE — 700111 HCHG RX REV CODE 636 W/ 250 OVERRIDE (IP): Performed by: INTERNAL MEDICINE

## 2019-08-19 PROCEDURE — 94760 N-INVAS EAR/PLS OXIMETRY 1: CPT

## 2019-08-19 PROCEDURE — 700102 HCHG RX REV CODE 250 W/ 637 OVERRIDE(OP): Performed by: INTERNAL MEDICINE

## 2019-08-19 PROCEDURE — 85025 COMPLETE CBC W/AUTO DIFF WBC: CPT

## 2019-08-19 RX ORDER — MORPHINE SULFATE 4 MG/ML
4 INJECTION, SOLUTION INTRAMUSCULAR; INTRAVENOUS ONCE
Status: COMPLETED | OUTPATIENT
Start: 2019-08-19 | End: 2019-08-19

## 2019-08-19 RX ORDER — DEXTROSE MONOHYDRATE 50 MG/ML
INJECTION, SOLUTION INTRAVENOUS CONTINUOUS
Status: CANCELLED | OUTPATIENT
Start: 2019-08-19

## 2019-08-19 RX ORDER — IPRATROPIUM BROMIDE AND ALBUTEROL SULFATE 2.5; .5 MG/3ML; MG/3ML
3 SOLUTION RESPIRATORY (INHALATION)
Status: DISCONTINUED | OUTPATIENT
Start: 2019-08-19 | End: 2019-08-23

## 2019-08-19 RX ADMIN — MORPHINE SULFATE 4 MG: 4 INJECTION INTRAVENOUS at 14:22

## 2019-08-19 RX ADMIN — OXYCODONE HYDROCHLORIDE 10 MG: 5 TABLET ORAL at 21:36

## 2019-08-19 RX ADMIN — ALBUTEROL SULFATE 2 PUFF: 90 AEROSOL, METERED RESPIRATORY (INHALATION) at 14:20

## 2019-08-19 RX ADMIN — ALBUTEROL SULFATE 2 PUFF: 90 AEROSOL, METERED RESPIRATORY (INHALATION) at 00:16

## 2019-08-19 RX ADMIN — MORPHINE SULFATE 4 MG: 4 INJECTION INTRAVENOUS at 05:38

## 2019-08-19 RX ADMIN — OXYCODONE HYDROCHLORIDE 10 MG: 5 TABLET ORAL at 00:15

## 2019-08-19 RX ADMIN — TIOTROPIUM BROMIDE 1 CAPSULE: 18 CAPSULE ORAL; RESPIRATORY (INHALATION) at 04:12

## 2019-08-19 RX ADMIN — ALBUTEROL SULFATE 2 PUFF: 90 AEROSOL, METERED RESPIRATORY (INHALATION) at 08:08

## 2019-08-19 RX ADMIN — ANTACID TABLETS 500 MG: 500 TABLET, CHEWABLE ORAL at 17:40

## 2019-08-19 RX ADMIN — ALBUTEROL SULFATE 2 PUFF: 90 AEROSOL, METERED RESPIRATORY (INHALATION) at 04:11

## 2019-08-19 RX ADMIN — IPRATROPIUM BROMIDE AND ALBUTEROL SULFATE 3 ML: .5; 3 SOLUTION RESPIRATORY (INHALATION) at 08:16

## 2019-08-19 RX ADMIN — MORPHINE SULFATE 4 MG: 4 INJECTION INTRAVENOUS at 10:14

## 2019-08-19 RX ADMIN — MORPHINE SULFATE 4 MG: 4 INJECTION INTRAVENOUS at 04:16

## 2019-08-19 RX ADMIN — OXYCODONE HYDROCHLORIDE 5 MG: 5 TABLET ORAL at 17:40

## 2019-08-19 RX ADMIN — IPRATROPIUM BROMIDE AND ALBUTEROL SULFATE 3 ML: .5; 3 SOLUTION RESPIRATORY (INHALATION) at 18:11

## 2019-08-19 RX ADMIN — ALBUTEROL SULFATE 2 PUFF: 90 AEROSOL, METERED RESPIRATORY (INHALATION) at 18:42

## 2019-08-19 ASSESSMENT — ENCOUNTER SYMPTOMS
INSOMNIA: 1
EYES NEGATIVE: 1
GASTROINTESTINAL NEGATIVE: 1
MYALGIAS: 1
CONSTITUTIONAL NEGATIVE: 1
DIAPHORESIS: 0
CHILLS: 0
DEPRESSION: 0
CONSTIPATION: 1
NERVOUS/ANXIOUS: 0
NAUSEA: 0
SHORTNESS OF BREATH: 0
VOMITING: 0
RESPIRATORY NEGATIVE: 1
HEADACHES: 0
FEVER: 0
BLURRED VISION: 0
CARDIOVASCULAR NEGATIVE: 1

## 2019-08-19 NOTE — CARE PLAN
Problem: Respiratory:  Goal: Respiratory status will improve  Outcome: PROGRESSING AS EXPECTED  Note:   Pt will request his PRN albuterol inhaler or a breathing treatment from Rt.      Problem: Bowel/Gastric:  Goal: Will not experience complications related to bowel motility  Outcome: PROGRESSING SLOWER THAN EXPECTED  Note:   Pt has not had a BM since 8/14. Constipation is likely r/t use of opioid pain relievers.

## 2019-08-19 NOTE — ASSESSMENT & PLAN NOTE
S/p CABG x2 (LIMA to LAD and SVG to OM), on Statin, lisinopril.     Plan  Continue home medications

## 2019-08-19 NOTE — ASSESSMENT & PLAN NOTE
Patient had a swelling of the left leg after a fall. Patient left leg dry, scaly, with hyperpigmentation over the distal half. With marked swelling over the proximal half. With shiny and erythematous skin and with local raise of temperature.  INR is 1.88. Continue warfarin.     Plan  Repeat INR tomorrow   Neurovascular checks q6h  Hot and cold compress with leg elevation.   Pain control: iv Morphine PRN and other pain regimen   PT and Ot recommendations for a SNF but patient is not willing for that suggestion.

## 2019-08-19 NOTE — DISCHARGE PLANNING
Transitional Care Coordination     Referral from Ivelisse Mtz MD  Covered through Medicare.     Presented to [facility] with left leg pain/ swelling. Dx with left calf hematoma.  No evidence of compartment syndrome.    Hx of HTN, HF, COPD, CAD, and atrial fibrillation.     Current documentation / dx does not support CMS criteria for admission in pt rehab at this time.   Therefore, this referral will not be sent to physiatry for a consult at this time.

## 2019-08-19 NOTE — CARE PLAN
Problem: Knowledge Deficit  Goal: Knowledge of the prescribed therapeutic regimen will improve  Outcome: PROGRESSING AS EXPECTED  Intervention: Discuss information regarding therpeutic regimen and document in education  Note:   Educated pt about NPO diet and the reason for NPO. Pt was unhappy about diet. Updates given to physician. Ortho came to assess pt. Diet order changed to regular.      Problem: Pain Management  Goal: Pain level will decrease to patient's comfort goal  Outcome: PROGRESSING AS EXPECTED  Intervention: Follow pain managment plan developed in collaboration with patient and Interdisciplinary Team  Note:   PRN morphine given for pain.

## 2019-08-19 NOTE — PROGRESS NOTES
Assumed care of pt from Curtis Hester. Pt has complaints of 7/10 LLE pain at this time. Reviewed with pt plan to be NPO at midnight    Call light within reach, treaded slipper socks on, bed locked in lowest position. Mobility and fall risk assessed.

## 2019-08-19 NOTE — NON-PROVIDER
"       Internal Medicine Interval Note  Note Author: Haseeb Painter, Student     Name Joshua Medrano     1965   Age/Sex 53 y.o. male   MRN [id number]   Code Status Full     After 5PM or if no immediate response to page, please call for cross-coverage  Attending/Team: Gemma/Tomas See Patient List for primary contact information  Call [phone] to page    1st Call - Day Intern (R1):   Annia 2nd Call - Day Sr. Resident (R2/R3):   Smith         Reason for interval visit  (Principal Problem)   Traumatic hematoma of left lower leg      Interval Problem Daily Status Update  (24 hours, problem oriented, brief subjective history, new lab/imaging data pertinent to that problem)   Patient's main complaints this AM were dry mouth and lack of sleep. He feels like the pain and swelling in his leg are gradually decreasing, although he did not get out of bed yesterday as his activity level is still low due to pain and decreased ROM in his left leg. He seemed frustrated about not being able to eat but understood as a procedure was possibly pending, he needed to be NPO. He requested ice chips for his dry mouth, which we gave to him. By rounds later that morning, around 11:20am, he was threatening to leave the hospital AMA, saying he would \"crawl [himself] out of here,\" if he wasn't either taken for a procedure or given something to eat. Per ortho reassessment, he does not need evacuation of his hematoma at this time and so his diet was resumed. On exam, vitals are stable. Neurovascular examination showed equal sensation at his feet bilaterally, cap refill was less than 2 seconds and pulses were intact and equal bilaterally. His LLE was warm to the touch but not much more than the RLE. Labs showed INR 1.29.      Review of Systems   Constitutional: Negative for chills and fever.   Eyes: Negative for blurred vision.   Respiratory: Negative for shortness of breath.    Cardiovascular: Negative for chest pain. "   Gastrointestinal: Positive for constipation (no bowel movement since 8/14, but does not feel constipated). Negative for nausea and vomiting.   Genitourinary: Negative for dysuria.   Musculoskeletal: Positive for myalgias (at LLE, especially at posterior).   Neurological: Negative for headaches.   Psychiatric/Behavioral: Negative for depression. The patient has insomnia (little to no sleep last night due to room transfer). The patient is not nervous/anxious.          Disposition/Barriers to discharge:   Inpatient due to monitoring for possible compartment syndrome    Consultants/Specialty  Josiah Brown, Orthopedics  Chester Gilliam, Orthopedics    PCP: Nam Le M.D.      Quality Measures  Quality-Core Measures   Reviewed items::  EKG reviewed, Labs reviewed, Medications reviewed and Radiology images reviewed  - Warfarin held due to supratherapeutic INR, possible procedure pending        Physical Exam       Vitals:    08/18/19 1930 08/19/19 0400 08/19/19 0800 08/19/19 0817   BP: 106/44 108/68 102/64    Pulse: 85 83 73 72   Resp: 18 18 17 16   Temp: 36.7 °C (98 °F) 36.6 °C (97.8 °F) 36.8 °C (98.3 °F)    TempSrc: Temporal Temporal Temporal    SpO2: 90% 90% 95% 96%   Weight:       Height:         Body mass index is 38.48 kg/m².   Oxygen Therapy:  Pulse Oximetry: 96 %, O2 (LPM): 3, O2 Delivery: Silicone Nasal Cannula    Physical Exam   Constitutional: He is oriented to person, place, and time. No distress.   HENT:   Head: Normocephalic and atraumatic.   Eyes: Pupils are equal, round, and reactive to light. EOM are normal.   Neck: Normal range of motion. Neck supple.   Cardiovascular: Normal rate, regular rhythm and normal heart sounds.   Pulmonary/Chest: Effort normal and breath sounds normal.   Abdominal: Soft. Bowel sounds are normal. He exhibits distension (at umbilicus; patient states this is from previous hernia and is not new). There is no tenderness.   Musculoskeletal:        Left hip: He exhibits tenderness.         Left lower leg: He exhibits tenderness and swelling.        Legs:  Neurological: He is alert and oriented to person, place, and time.   Skin: There is erythema (at posterior LLE).   Psychiatric: Mood and affect normal.             Assessment/Plan       1. Traumatic hematoma of left lower leg - patient was on warfarin with supratherapeutic INR 4.55 on admission, suspect trauma-induced hemorrhage due to supratherapeutic INR; initial concern for compartment syndrome; per ortho, not present; recommended conservative management until INR lowered; he received Vit K and FFP in the interim; INR now 1.29  - Per ortho assessment, patient does not need hematoma evacuation; recommend medical management  - Resume warfarin 5 mg tab once daily; will refer to outpatient anticoagulation clinic for follow up, especially in light of him being admitted with supratherapeutic INR; may need dosage adjustment  - Ordered PT evaluation/treatment  - Inquired about acute rehab, need PT eval for him to be considered a candidate; PT eval pending  - Disposition dependent on pain control and PT assessment  - In the meantime, encourage mobility, monitor ability  - Continue to alternate hot and cold compression with leg elevation  - For pain, oxycodone PO 5-10mg q4h PRN, morphine IV 2-4mg q4h PRN, tylenol 1000mg q8h PRN for baseline     2. Heart failure with preserved ejection fraction - (present on admission)   Chronic, not acutely decompensated. Last EF 55%, patient has mild concentric left ventricular hypertrophy, enlarged right atrium and severely dilated right ventricle. Currently, extremities with strong pulses and are well perfused.  - Continue home medications, including lisinopril, furosemide, metoprolol, rosuvastatin, sprionolactone     3. CAD - (present on admission)  S/p CABG x2 (LIMA to LAD and SVG to OM), on statin, lisinopril  - Continue home meds     4. COPD - (present on admission)  Stable. Hct 50.8 this morning. Currently on  tiotropium, albuterol and Duoneb.  - Continue home meds     5. Left leg swelling - (present on admission)  Questionable cellulitis on admission. Then, WBC 25.7 and patient's leg looked erythematous and was tender on palpation. Started on ceftriaxone empirically in ED, but discontinued soon afterwards. That evening blood cultures showed gram positive rods, so patient was started on Vancomycin empirically. Results thought to be contaminant, so vanco was d/c'd as well.   - Discontinued antibiotics     6. Paroxysmal atrial fibrillation - (present on admission)   Stable on amiodarone.  - Continue amiodarone  - Will resume warfarin for thromboembolism prevention as patient will no longer undergo surgical removal of hematoma

## 2019-08-19 NOTE — PROGRESS NOTES
Internal Medicine Interval Note  Note Author: Ximena Milner M.D.     Name Joshua Medrano     1965   Age/Sex 53 y.o. male   MRN [id number]   Code Status Full     After 5PM or if no immediate response to page, please call for cross-coverage  Attending/Team: Dr Menendez See Patient List for primary contact information  Call [phone] to page    1st Call - Day Intern (R1):   Annia 2nd Call - Day Sr. Resident (R2/R3):   Dr Mtz         Reason for interval visit  (Principal Problem)   Left lower leg swelling after a fall.      Interval Problem Daily Status Update  (24 hours, problem oriented, brief subjective history, new lab/imaging data pertinent to that problem)   Patient was sitting comfortably on the bed but with frustration saying he is feeling thirsty and hungry. He is kept NPO for a surgery, but was not seen by surgeon at. C/o pain in the left leg but much better, Morphine and lyrica are helping. Pain is 4/10 today.      Left calf looks swollen and dry scaly over the distal half and erythematous and shinning over the proximal half. Slight local raise of temperature and mild tenderness. Peripheral pulses and sensations are intact.     INR for today is-1.29 Will cont to hold anticoagulation for now and watch left limb closely.   Review of Systems   Constitutional: Negative.  Negative for chills, diaphoresis, fever and malaise/fatigue.   HENT: Negative.    Eyes: Negative.    Respiratory: Negative.    Cardiovascular: Negative.    Gastrointestinal: Negative.    Musculoskeletal:        Patient feeling swelling in the left leg and heavy but not much pain.   Skin: Positive for rash.        Patients skin dry and scaly with some hyperpigmentation especially over both distal legs.       Disposition/Barriers to discharge:   Inpatient/Left leg swelling/ ?Hematoma    Consultants/Specialty  Ortho surgery  PCP: Nam Le M.D.      Quality Measures  Quality-Core Measures   Reviewed items::   Medications reviewed  Ceballos catheter::  No Ceballos  DVT prophylaxis - mechanical:  SCDs          Physical Exam       Vitals:    08/19/19 0400 08/19/19 0800 08/19/19 0817 08/19/19 1528   BP: 108/68 102/64  115/59   Pulse: 83 73 72 88   Resp: 18 17 16 16   Temp: 36.6 °C (97.8 °F) 36.8 °C (98.3 °F)  36.2 °C (97.2 °F)   TempSrc: Temporal Temporal  Temporal   SpO2: 90% 95% 96% 95%   Weight:       Height:         Body mass index is 38.48 kg/m².   Oxygen Therapy:  Pulse Oximetry: 95 %, O2 (LPM): 3, O2 Delivery: Silicone Nasal Cannula    Physical Exam   Constitutional: He is oriented to person, place, and time.   Patient is oriented to person, place and time,, No distress, On oxygen. Patient is upet as they changed his room last night.   HENT:   Head: Normocephalic and atraumatic.   Eyes: Pupils are equal, round, and reactive to light. Conjunctivae and EOM are normal.   Neck: Neck supple.   Cardiovascular: Normal rate and regular rhythm.   Pulmonary/Chest: Breath sounds normal. He has no wheezes. He has no rales. He exhibits no tenderness.   Abdominal: Soft. Bowel sounds are normal.   Musculoskeletal:   Swollen left leg and dry skin with localized raise of temperature and tenderness.   Neurological: He is alert and oriented to person, place, and time. GCS score is 15.   Skin: Skin is dry. Rash noted.             Assessment/Plan     * Traumatic hematoma of left lower leg- (present on admission)  Assessment & Plan  Patient had a swelling of the left leg after a fall. Patient left leg dry, scaly, with hyperpigmentation over the distal half. With marked swelling over the proximal half. With shiny and erythematous skin and with local raise of temperature.   Patient need to be seen by surgeons for an surgery.  INR is subtherapeutic 1.29. Will cont to hold anticoagulation for now and watch left limb closely     Plan  Repeat INR tomorrow   Neurovascular checks q6h  Hot and cold compress with leg elevation.   Pain control: iv Morphine  PRN and other pain regimen   Orthopedic surgery on board and following    Heart failure with preserved ejection fraction, borderline, class IV (HCC)- (present on admission)  Assessment & Plan  HF is chronic and not acutely decompensated. Stable. Last EF is 55%, pt has mild concentric left ventricular hypertrophy, enlarged right atrium and severely dilated right ventricle. Extremities with strong pulse and well perfused.     Plan  Continue lisinopril, furosemide, metoprolol, rosuvastatin, spironolactone    CAD (coronary artery disease)- (present on admission)  Assessment & Plan  S/p CABG x2 (LIMA to LAD and SVG to OM), on Statin, lisinopril.     Plan  Continue home medications    Chronic obstructive pulmonary disease with acute exacerbation (HCC)- (present on admission)  Assessment & Plan  Stable. Hct 59.2 On Spiriva, albuterol, and duonebs.    Paroxysmal atrial fibrillation (HCC)- (present on admission)  Assessment & Plan  Stable on amiodarone.     Plan  Continue home amiodarone

## 2019-08-19 NOTE — PROGRESS NOTES
Pt has refused the use of a bed alarm (indicated per [place] fall risk assessment). Pt educated on use of bed alarm to prevent falls, pt continues to refuse. Pt states he will use the call light to ask for assistance in ambulating.

## 2019-08-20 ENCOUNTER — APPOINTMENT (OUTPATIENT)
Dept: RADIOLOGY | Facility: MEDICAL CENTER | Age: 54
DRG: 605 | End: 2019-08-20
Attending: INTERNAL MEDICINE
Payer: MEDICARE

## 2019-08-20 PROBLEM — K59.00 CONSTIPATION: Status: ACTIVE | Noted: 2019-08-20

## 2019-08-20 PROBLEM — R53.81 PHYSICAL DECONDITIONING: Status: ACTIVE | Noted: 2019-08-20

## 2019-08-20 LAB
ANION GAP SERPL CALC-SCNC: 6 MMOL/L (ref 0–11.9)
BACTERIA BLD CULT: NORMAL
BASOPHILS # BLD AUTO: 1.7 % (ref 0–1.8)
BASOPHILS # BLD: 0.22 K/UL (ref 0–0.12)
BUN SERPL-MCNC: 23 MG/DL (ref 8–22)
CALCIUM SERPL-MCNC: 9 MG/DL (ref 8.5–10.5)
CHLORIDE SERPL-SCNC: 99 MMOL/L (ref 96–112)
CO2 SERPL-SCNC: 27 MMOL/L (ref 20–33)
CREAT SERPL-MCNC: 0.81 MG/DL (ref 0.5–1.4)
EOSINOPHIL # BLD AUTO: 0.25 K/UL (ref 0–0.51)
EOSINOPHIL NFR BLD: 2 % (ref 0–6.9)
ERYTHROCYTE [DISTWIDTH] IN BLOOD BY AUTOMATED COUNT: 83.7 FL (ref 35.9–50)
GLUCOSE SERPL-MCNC: 186 MG/DL (ref 65–99)
HCT VFR BLD AUTO: 55.6 % (ref 42–52)
HGB BLD-MCNC: 16.1 G/DL (ref 14–18)
IMM GRANULOCYTES # BLD AUTO: 0.23 K/UL (ref 0–0.11)
IMM GRANULOCYTES NFR BLD AUTO: 1.8 % (ref 0–0.9)
INR PPP: 1.22 (ref 0.87–1.13)
LYMPHOCYTES # BLD AUTO: 0.65 K/UL (ref 1–4.8)
LYMPHOCYTES NFR BLD: 5.1 % (ref 22–41)
MCH RBC QN AUTO: 25.2 PG (ref 27–33)
MCHC RBC AUTO-ENTMCNC: 29 G/DL (ref 33.7–35.3)
MCV RBC AUTO: 87 FL (ref 81.4–97.8)
MONOCYTES # BLD AUTO: 1.02 K/UL (ref 0–0.85)
MONOCYTES NFR BLD AUTO: 8 % (ref 0–13.4)
NEUTROPHILS # BLD AUTO: 10.33 K/UL (ref 1.82–7.42)
NEUTROPHILS NFR BLD: 81.4 % (ref 44–72)
NRBC # BLD AUTO: 0 K/UL
NRBC BLD-RTO: 0 /100 WBC
PLATELET # BLD AUTO: 264 K/UL (ref 164–446)
PMV BLD AUTO: 9.3 FL (ref 9–12.9)
POTASSIUM SERPL-SCNC: 4.6 MMOL/L (ref 3.6–5.5)
PROTHROMBIN TIME: 15.7 SEC (ref 12–14.6)
RBC # BLD AUTO: 6.39 M/UL (ref 4.7–6.1)
SIGNIFICANT IND 70042: NORMAL
SITE SITE: NORMAL
SODIUM SERPL-SCNC: 132 MMOL/L (ref 135–145)
SOURCE SOURCE: NORMAL
WBC # BLD AUTO: 12.7 K/UL (ref 4.8–10.8)

## 2019-08-20 PROCEDURE — A9270 NON-COVERED ITEM OR SERVICE: HCPCS | Performed by: STUDENT IN AN ORGANIZED HEALTH CARE EDUCATION/TRAINING PROGRAM

## 2019-08-20 PROCEDURE — 700102 HCHG RX REV CODE 250 W/ 637 OVERRIDE(OP): Performed by: INTERNAL MEDICINE

## 2019-08-20 PROCEDURE — 770006 HCHG ROOM/CARE - MED/SURG/GYN SEMI*

## 2019-08-20 PROCEDURE — 80048 BASIC METABOLIC PNL TOTAL CA: CPT

## 2019-08-20 PROCEDURE — A9270 NON-COVERED ITEM OR SERVICE: HCPCS | Performed by: INTERNAL MEDICINE

## 2019-08-20 PROCEDURE — 99232 SBSQ HOSP IP/OBS MODERATE 35: CPT | Mod: GC | Performed by: INTERNAL MEDICINE

## 2019-08-20 PROCEDURE — 700102 HCHG RX REV CODE 250 W/ 637 OVERRIDE(OP): Performed by: STUDENT IN AN ORGANIZED HEALTH CARE EDUCATION/TRAINING PROGRAM

## 2019-08-20 PROCEDURE — 97162 PT EVAL MOD COMPLEX 30 MIN: CPT

## 2019-08-20 PROCEDURE — 94640 AIRWAY INHALATION TREATMENT: CPT

## 2019-08-20 PROCEDURE — 94760 N-INVAS EAR/PLS OXIMETRY 1: CPT

## 2019-08-20 PROCEDURE — 700101 HCHG RX REV CODE 250: Performed by: INTERNAL MEDICINE

## 2019-08-20 PROCEDURE — 700111 HCHG RX REV CODE 636 W/ 250 OVERRIDE (IP): Performed by: INTERNAL MEDICINE

## 2019-08-20 PROCEDURE — 36415 COLL VENOUS BLD VENIPUNCTURE: CPT

## 2019-08-20 PROCEDURE — 85025 COMPLETE CBC W/AUTO DIFF WBC: CPT

## 2019-08-20 PROCEDURE — 85610 PROTHROMBIN TIME: CPT

## 2019-08-20 RX ORDER — ENEMA 19; 7 G/133ML; G/133ML
1 ENEMA RECTAL ONCE
Status: COMPLETED | OUTPATIENT
Start: 2019-08-20 | End: 2019-08-20

## 2019-08-20 RX ORDER — WARFARIN SODIUM 5 MG/1
5 TABLET ORAL
Status: DISCONTINUED | OUTPATIENT
Start: 2019-08-20 | End: 2019-08-20

## 2019-08-20 RX ORDER — WARFARIN SODIUM 7.5 MG/1
7.5 TABLET ORAL EVERY MORNING
Status: DISCONTINUED | OUTPATIENT
Start: 2019-08-20 | End: 2019-08-21

## 2019-08-20 RX ORDER — ALBUTEROL SULFATE 90 UG/1
2 AEROSOL, METERED RESPIRATORY (INHALATION) EVERY 4 HOURS PRN
Status: DISCONTINUED | OUTPATIENT
Start: 2019-08-20 | End: 2019-08-24 | Stop reason: HOSPADM

## 2019-08-20 RX ADMIN — WARFARIN SODIUM 7.5 MG: 7.5 TABLET ORAL at 23:44

## 2019-08-20 RX ADMIN — FUROSEMIDE 40 MG: 40 TABLET ORAL at 04:31

## 2019-08-20 RX ADMIN — SENNOSIDES, DOCUSATE SODIUM 2 TABLET: 50; 8.6 TABLET, FILM COATED ORAL at 04:29

## 2019-08-20 RX ADMIN — IPRATROPIUM BROMIDE AND ALBUTEROL SULFATE 3 ML: .5; 3 SOLUTION RESPIRATORY (INHALATION) at 10:50

## 2019-08-20 RX ADMIN — TIOTROPIUM BROMIDE 1 CAPSULE: 18 CAPSULE ORAL; RESPIRATORY (INHALATION) at 04:26

## 2019-08-20 RX ADMIN — IPRATROPIUM BROMIDE AND ALBUTEROL SULFATE 3 ML: .5; 3 SOLUTION RESPIRATORY (INHALATION) at 19:22

## 2019-08-20 RX ADMIN — SPIRONOLACTONE 25 MG: 25 TABLET ORAL at 04:28

## 2019-08-20 RX ADMIN — IPRATROPIUM BROMIDE AND ALBUTEROL SULFATE 3 ML: .5; 3 SOLUTION RESPIRATORY (INHALATION) at 06:29

## 2019-08-20 RX ADMIN — POTASSIUM CHLORIDE 10 MEQ: 20 TABLET, EXTENDED RELEASE ORAL at 04:28

## 2019-08-20 RX ADMIN — FERROUS SULFATE TAB 325 MG (65 MG ELEMENTAL FE) 325 MG: 325 (65 FE) TAB at 09:23

## 2019-08-20 RX ADMIN — METOPROLOL SUCCINATE 25 MG: 25 TABLET, EXTENDED RELEASE ORAL at 04:29

## 2019-08-20 RX ADMIN — LISINOPRIL 2.5 MG: 2.5 TABLET ORAL at 04:29

## 2019-08-20 RX ADMIN — ROSUVASTATIN CALCIUM 40 MG: 20 TABLET, FILM COATED ORAL at 04:27

## 2019-08-20 RX ADMIN — SENNOSIDES, DOCUSATE SODIUM 2 TABLET: 50; 8.6 TABLET, FILM COATED ORAL at 17:07

## 2019-08-20 RX ADMIN — OXYCODONE HYDROCHLORIDE 10 MG: 5 TABLET ORAL at 05:11

## 2019-08-20 RX ADMIN — ENOXAPARIN SODIUM 40 MG: 100 INJECTION SUBCUTANEOUS at 17:07

## 2019-08-20 RX ADMIN — SODIUM PHOSPHATE 133 ML: 7; 19 ENEMA RECTAL at 15:13

## 2019-08-20 RX ADMIN — OXYCODONE HYDROCHLORIDE 10 MG: 5 TABLET ORAL at 15:12

## 2019-08-20 RX ADMIN — IPRATROPIUM BROMIDE AND ALBUTEROL SULFATE 3 ML: .5; 3 SOLUTION RESPIRATORY (INHALATION) at 15:39

## 2019-08-20 RX ADMIN — OXYCODONE HYDROCHLORIDE 10 MG: 5 TABLET ORAL at 10:34

## 2019-08-20 RX ADMIN — ANTACID TABLETS 500 MG: 500 TABLET, CHEWABLE ORAL at 17:07

## 2019-08-20 RX ADMIN — IPRATROPIUM BROMIDE AND ALBUTEROL SULFATE 3 ML: .5; 3 SOLUTION RESPIRATORY (INHALATION) at 00:14

## 2019-08-20 RX ADMIN — OXYCODONE HYDROCHLORIDE 10 MG: 5 TABLET ORAL at 21:46

## 2019-08-20 RX ADMIN — AMIODARONE HYDROCHLORIDE 200 MG: 200 TABLET ORAL at 04:29

## 2019-08-20 RX ADMIN — POLYETHYLENE GLYCOL 3350 1 PACKET: 17 POWDER, FOR SOLUTION ORAL at 05:20

## 2019-08-20 ASSESSMENT — ENCOUNTER SYMPTOMS
HEARTBURN: 0
WHEEZING: 0
BRUISES/BLEEDS EASILY: 0
ORTHOPNEA: 0
PALPITATIONS: 0
DOUBLE VISION: 0
BACK PAIN: 0
ABDOMINAL PAIN: 0
CLAUDICATION: 0
TINGLING: 0
PHOTOPHOBIA: 0
SENSORY CHANGE: 0
BLURRED VISION: 0
NAUSEA: 0
DIZZINESS: 0
DIARRHEA: 0
VOMITING: 0
TREMORS: 0
CONSTIPATION: 1
POLYDIPSIA: 0
WEAKNESS: 0
SEIZURES: 0
CHILLS: 0
FLANK PAIN: 0
DIAPHORESIS: 0
FEVER: 0
MYALGIAS: 0
HEADACHES: 0
FALLS: 1
COUGH: 0

## 2019-08-20 ASSESSMENT — COGNITIVE AND FUNCTIONAL STATUS - GENERAL
SUGGESTED CMS G CODE MODIFIER MOBILITY: CL
STANDING UP FROM CHAIR USING ARMS: A LOT
CLIMB 3 TO 5 STEPS WITH RAILING: TOTAL
WALKING IN HOSPITAL ROOM: TOTAL
MOVING TO AND FROM BED TO CHAIR: A LITTLE
MOVING FROM LYING ON BACK TO SITTING ON SIDE OF FLAT BED: UNABLE
MOBILITY SCORE: 12

## 2019-08-20 ASSESSMENT — GAIT ASSESSMENTS: GAIT LEVEL OF ASSIST: UNABLE TO PARTICIPATE

## 2019-08-20 NOTE — PROGRESS NOTES
Assumed care of pt from Nick Connor Rn. Pt has vague complaints of pain/discomfort at this time.     Call light within reach, treaded slipper socks on, bed locked in lowest position. Mobility and fall risk assessed. Bed alarm is in use.

## 2019-08-20 NOTE — PROGRESS NOTES
Pt had no BM since 14th. Educated pt about senna and miralax and importance of having regular BM.  Pt refused for now. Pt stated that '' I can feel it is coming soon so I don't need it yet. Bowel sound is active x4.

## 2019-08-20 NOTE — PROGRESS NOTES
Internal Medicine Interval Note  Note Author: Ximena Milner M.D.     Name Joshua Medrano     1965   Age/Sex 53 y.o. male   MRN [id number]   Code Status Full     After 5PM or if no immediate response to page, please call for cross-coverage  Attending/Team: Dr Menendez/Tomas See Patient List for primary contact information  Call [phone] to page    1st Call - Day Intern (R1):   Annia 2nd Call - Day Sr. Resident (R2/R3):   Dr Mtz         Reason for interval visit  (Principal Problem)   Left lower leg swelling after a fall,  Constipation      Interval Problem Daily Status Update  (24 hours, problem oriented, brief subjective history, new lab/imaging data pertinent to that problem)     S: Patient has no overnight complaints. Didn't pass stool from 1 week, Patient has slight pain in the leg. Denies any more swelling, nausea, vomiting, abdominal pain.    O: Patients left lef still swollen, Distal half is dry scaly, and calf is swollen and erythematous and shiny skin, local raise of temperature and tenderness.left calf diameter is 49cm and is same as it yesterday.    Review of Systems   Constitutional: Negative for chills, diaphoresis, fever and malaise/fatigue.   HENT: Negative for congestion, ear pain and tinnitus.    Eyes: Negative for blurred vision, double vision and photophobia.   Respiratory: Negative for cough and wheezing.    Cardiovascular: Positive for leg swelling. Negative for chest pain, palpitations, orthopnea and claudication.        Patient has left leg swelling especially the calf.   Gastrointestinal: Positive for constipation. Negative for abdominal pain, diarrhea, heartburn, nausea and vomiting.   Genitourinary: Negative for dysuria, flank pain, frequency and urgency.   Musculoskeletal: Positive for falls and joint pain. Negative for back pain and myalgias.   Skin: Positive for rash. Negative for itching.   Neurological: Negative for dizziness, tingling, tremors, sensory  change, seizures, weakness and headaches.   Endo/Heme/Allergies: Negative for polydipsia. Does not bruise/bleed easily.       Disposition/Barriers to discharge:   Inpatient/ Left leg swelling/ hematoma    Consultants/Specialty  Orthopedics    PCP: Nam Le M.D.      Quality Measures  Quality-Core Measures   Reviewed items::  Labs reviewed and Medications reviewed  Ceballos catheter::  No Ceballos  DVT prophylaxis pharmacological::  Enoxaparin (Lovenox)  DVT prophylaxis - mechanical:  SCDs  Ulcer Prophylaxis::  Yes          Physical Exam       Vitals:    08/20/19 0245 08/20/19 0630 08/20/19 0758 08/20/19 1050   BP: 118/73  109/68    Pulse: 72 75 73 75   Resp: 17 18 16 18   Temp: 36.3 °C (97.3 °F)  36.9 °C (98.5 °F)    TempSrc: Temporal  Temporal    SpO2: 97% 92% 93% 93%   Weight:       Height:         Body mass index is 38.48 kg/m².   Oxygen Therapy:  Pulse Oximetry: 93 %, O2 (LPM): 3, FiO2%: 21 %, O2 Delivery: None (Room Air)    Physical Exam   Constitutional: He is oriented to person, place, and time and well-developed, well-nourished, and in no distress. No distress.   HENT:   Head: Normocephalic and atraumatic.   Eyes: Pupils are equal, round, and reactive to light. Conjunctivae and EOM are normal.   Neck: Normal range of motion. Neck supple.   Cardiovascular: Normal rate and normal heart sounds.   Pulmonary/Chest: Effort normal and breath sounds normal. No respiratory distress. He has no wheezes. He has no rales.   Abdominal: Soft. Bowel sounds are normal. He exhibits no distension. There is no tenderness. There is no rebound and no guarding.   Musculoskeletal: Normal range of motion. He exhibits edema and tenderness. He exhibits no deformity.   Patient has swelling of the left calf with diameter of 49cm. With local raise of temperature and tenderness.   Neurological: He is alert and oriented to person, place, and time. No cranial nerve deficit. GCS score is 15.   Skin: Skin is warm. He is not diaphoretic. No  erythema.   Nursing note and vitals reviewed.            Assessment/Plan     * Traumatic hematoma of left lower leg- (present on admission)  Assessment & Plan  Patient had a swelling of the left leg after a fall. Patient left leg dry, scaly, with hyperpigmentation over the distal half. With marked swelling over the proximal half. With shiny and erythematous skin and with local raise of temperature.   Patient was seen by  Orthopedic surgeons and need to be advised on the surgery for hematoma.  INR is subtherapeutic 1.22. Will cont to hold anticoagulation for now and watch left limb closely.     Plan  Repeat INR tomorrow   Neurovascular checks q6h  Hot and cold compress with leg elevation.   Pain control: iv Morphine PRN and other pain regimen     Heart failure with preserved ejection fraction, borderline, class IV (HCC)- (present on admission)  Assessment & Plan  HF is chronic and not acutely decompensated. Stable. Last EF is 55%, pt has mild concentric left ventricular hypertrophy, enlarged right atrium and severely dilated right ventricle. Extremities with strong pulse and well perfused.     Plan  Continue lisinopril, furosemide, metoprolol, rosuvastatin, spironolactone    CAD (coronary artery disease)- (present on admission)  Assessment & Plan  S/p CABG x2 (LIMA to LAD and SVG to OM), on Statin, lisinopril.     Plan  Continue home medications    Chronic obstructive pulmonary disease with acute exacerbation (HCC)- (present on admission)  Assessment & Plan  Stable. On Spiriva, albuterol, and duonebs.    Constipation  Assessment & Plan  Patient has not passed stool since a week. No complaints of abdominal pain and vomiting.  For Abdominal X ray  And to give enema.      Paroxysmal atrial fibrillation (HCC)- (present on admission)  Assessment & Plan  Stable on amiodarone.     Plan  Continue home amiodarone

## 2019-08-20 NOTE — ASSESSMENT & PLAN NOTE
Patient didn't have a bowel movement yesterday. No complaints of abdominal pain and vomiting.

## 2019-08-20 NOTE — CARE PLAN
Problem: Safety  Goal: Will remain free from injury  Outcome: PROGRESSING AS EXPECTED  Patient refused bed alarm. Encouraged pt to call for needs.      Problem: Mobility  Goal: Risk for activity intolerance will decrease  Outcome: PROGRESSING AS EXPECTED  Encouraged patient to sit in chair or edge of bed for meals.

## 2019-08-20 NOTE — THERAPY
"Physical Therapy Evaluation completed.   Bed Mobility:  Supine to Sit: Supervised  Transfers: Sit to Stand: Refused  Gait: Level Of Assist: Unable to Participate with Front-Wheel Walker       Plan of Care: Will benefit from Physical Therapy 3 times per week  Discharge Recommendations: Equipment: Will Continue to Assess for Equipment Needs. Post-acute therapy Recommend post-acute placement for continued physical therapy services prior to discharge home. Patient can tolerate post-acute therapies at a 5x/week frequency.       Mr. Medrano is a 54 y/o male who presents to acute secondary to L calf hematoma. Potential for evacuation but no update regarding this and no surgery planned for today. Per RN [facility] MD wants NWB but no formal orders. Pt able to come to EOB, however unable to sequence/too fearful to attempt STS even with therapist assist. Refused to even attempt. Pt will odd affect throughout, not explaining course of his debility with consistency. Additionally appeared to have poor control of tongue when talking, frequent protrusion of tongue with speech. Based on his mobility today not safe to return home, recommend post acute placement for additonal therapies. Provided supine therex to perform within pain tolerance. Acute PT to continue to follow while in house to improve mobility. Strongly recommend OT eval due to debility.        See \"Rehab Therapy-Acute\" Patient Summary Report for complete documentation.     "

## 2019-08-20 NOTE — PROGRESS NOTES
Received report from night shift and assumed care. Assessment completed, POC discussed. Pt is A&Ox4. Denies pain or discomfort. LLE is red and swollen. Pt worked with PT this morning and is now eating breakfast. All other needs met at this time. Safety precautions and hourly rounding in place.       1530: administered fleet enema per MAR. Pt had large BM.

## 2019-08-20 NOTE — PROGRESS NOTES
Orthopaedic PA Progress Note    Interval changes:Case d/w Dr. Gilliam  Will not require surgery at this time  Recommend ACE wrap and elevation for 2-4 weeks, cautiously resume anticoagulation if required for PAF  May benefit from cardiology re-eval for alternative OAC  Follow-Up: needs appointment with Dr. Gilliam at  [facility] in about 10-14 days post-op for re-evaluation hematoma

## 2019-08-20 NOTE — PROGRESS NOTES
Assumed care of pt at shift change. A/Ox4, discussed plan of care. Pt on 3 liter oxygen. Pt takes off oxygen sometimes. Educated pt to keep oxygen on.  Called RT multiple times for breathing treatment because pt requested it. PRN albuterol given to pt. Pt was irritated  this morning due to NPO diet. Educated provided. Pt wanted to talk to charge RN. Charge RN notified and talked with pt. Pt wanted the ORTHO physician see him ASAP. Paged the doctor and physician came to bedside and talked with pt. Ortho came to see pt/. Diet order changed to regular. New CBC, BMP and P[T ordered received. ;Lab called for overlapping PT order. Paged [facility] claudette. Per physician it is okay to cancel th newest PT order. Notified lab.      All needs met at this time. Bed in lowest position, treaded socks on, personal belongings and call light within reach, instructed to call for any assistance, hourly rounding in place.

## 2019-08-21 ENCOUNTER — APPOINTMENT (OUTPATIENT)
Dept: RADIOLOGY | Facility: MEDICAL CENTER | Age: 54
DRG: 605 | End: 2019-08-21
Attending: INTERNAL MEDICINE
Payer: MEDICARE

## 2019-08-21 LAB
ANION GAP SERPL CALC-SCNC: 6 MMOL/L (ref 0–11.9)
BACTERIA BLD CULT: NORMAL
BACTERIA BLD CULT: NORMAL
BASOPHILS # BLD AUTO: 1 % (ref 0–1.8)
BASOPHILS # BLD: 0.17 K/UL (ref 0–0.12)
BUN SERPL-MCNC: 21 MG/DL (ref 8–22)
CALCIUM SERPL-MCNC: 9.4 MG/DL (ref 8.5–10.5)
CHLORIDE SERPL-SCNC: 96 MMOL/L (ref 96–112)
CO2 SERPL-SCNC: 28 MMOL/L (ref 20–33)
CREAT SERPL-MCNC: 0.83 MG/DL (ref 0.5–1.4)
EOSINOPHIL # BLD AUTO: 0.36 K/UL (ref 0–0.51)
EOSINOPHIL NFR BLD: 2.1 % (ref 0–6.9)
ERYTHROCYTE [DISTWIDTH] IN BLOOD BY AUTOMATED COUNT: 76.8 FL (ref 35.9–50)
GLUCOSE SERPL-MCNC: 159 MG/DL (ref 65–99)
HCT VFR BLD AUTO: 51.2 % (ref 42–52)
HGB BLD-MCNC: 15.1 G/DL (ref 14–18)
IMM GRANULOCYTES # BLD AUTO: 0.28 K/UL (ref 0–0.11)
IMM GRANULOCYTES NFR BLD AUTO: 1.6 % (ref 0–0.9)
INR PPP: 1.19 (ref 0.87–1.13)
LYMPHOCYTES # BLD AUTO: 0.63 K/UL (ref 1–4.8)
LYMPHOCYTES NFR BLD: 3.7 % (ref 22–41)
MCH RBC QN AUTO: 24 PG (ref 27–33)
MCHC RBC AUTO-ENTMCNC: 29.5 G/DL (ref 33.7–35.3)
MCV RBC AUTO: 81.4 FL (ref 81.4–97.8)
MONOCYTES # BLD AUTO: 1.5 K/UL (ref 0–0.85)
MONOCYTES NFR BLD AUTO: 8.7 % (ref 0–13.4)
NEUTROPHILS # BLD AUTO: 14.23 K/UL (ref 1.82–7.42)
NEUTROPHILS NFR BLD: 82.9 % (ref 44–72)
NRBC # BLD AUTO: 0 K/UL
NRBC BLD-RTO: 0 /100 WBC
PLATELET # BLD AUTO: 326 K/UL (ref 164–446)
PMV BLD AUTO: 9.1 FL (ref 9–12.9)
POTASSIUM SERPL-SCNC: 4.5 MMOL/L (ref 3.6–5.5)
PROTHROMBIN TIME: 15.4 SEC (ref 12–14.6)
RBC # BLD AUTO: 6.29 M/UL (ref 4.7–6.1)
SIGNIFICANT IND 70042: NORMAL
SIGNIFICANT IND 70042: NORMAL
SITE SITE: NORMAL
SITE SITE: NORMAL
SODIUM SERPL-SCNC: 130 MMOL/L (ref 135–145)
SOURCE SOURCE: NORMAL
SOURCE SOURCE: NORMAL
WBC # BLD AUTO: 17.2 K/UL (ref 4.8–10.8)

## 2019-08-21 PROCEDURE — 770006 HCHG ROOM/CARE - MED/SURG/GYN SEMI*

## 2019-08-21 PROCEDURE — 700102 HCHG RX REV CODE 250 W/ 637 OVERRIDE(OP): Performed by: STUDENT IN AN ORGANIZED HEALTH CARE EDUCATION/TRAINING PROGRAM

## 2019-08-21 PROCEDURE — 700101 HCHG RX REV CODE 250: Performed by: INTERNAL MEDICINE

## 2019-08-21 PROCEDURE — 700111 HCHG RX REV CODE 636 W/ 250 OVERRIDE (IP): Performed by: INTERNAL MEDICINE

## 2019-08-21 PROCEDURE — 94760 N-INVAS EAR/PLS OXIMETRY 1: CPT

## 2019-08-21 PROCEDURE — 36415 COLL VENOUS BLD VENIPUNCTURE: CPT

## 2019-08-21 PROCEDURE — 700102 HCHG RX REV CODE 250 W/ 637 OVERRIDE(OP): Performed by: INTERNAL MEDICINE

## 2019-08-21 PROCEDURE — 99232 SBSQ HOSP IP/OBS MODERATE 35: CPT | Mod: GC | Performed by: INTERNAL MEDICINE

## 2019-08-21 PROCEDURE — 85610 PROTHROMBIN TIME: CPT

## 2019-08-21 PROCEDURE — A9270 NON-COVERED ITEM OR SERVICE: HCPCS | Performed by: STUDENT IN AN ORGANIZED HEALTH CARE EDUCATION/TRAINING PROGRAM

## 2019-08-21 PROCEDURE — A9270 NON-COVERED ITEM OR SERVICE: HCPCS | Performed by: INTERNAL MEDICINE

## 2019-08-21 PROCEDURE — 85025 COMPLETE CBC W/AUTO DIFF WBC: CPT

## 2019-08-21 PROCEDURE — 80048 BASIC METABOLIC PNL TOTAL CA: CPT

## 2019-08-21 PROCEDURE — 94640 AIRWAY INHALATION TREATMENT: CPT

## 2019-08-21 RX ORDER — WARFARIN SODIUM 7.5 MG/1
7.5 TABLET ORAL
Status: DISCONTINUED | OUTPATIENT
Start: 2019-08-21 | End: 2019-08-23

## 2019-08-21 RX ADMIN — IPRATROPIUM BROMIDE AND ALBUTEROL SULFATE 3 ML: .5; 3 SOLUTION RESPIRATORY (INHALATION) at 01:26

## 2019-08-21 RX ADMIN — SPIRONOLACTONE 25 MG: 25 TABLET ORAL at 05:23

## 2019-08-21 RX ADMIN — OXYCODONE HYDROCHLORIDE 10 MG: 5 TABLET ORAL at 16:40

## 2019-08-21 RX ADMIN — FERROUS SULFATE TAB 325 MG (65 MG ELEMENTAL FE) 325 MG: 325 (65 FE) TAB at 08:15

## 2019-08-21 RX ADMIN — METOPROLOL SUCCINATE 25 MG: 25 TABLET, EXTENDED RELEASE ORAL at 08:14

## 2019-08-21 RX ADMIN — WARFARIN SODIUM 7.5 MG: 7.5 TABLET ORAL at 17:34

## 2019-08-21 RX ADMIN — ALBUTEROL SULFATE 2 PUFF: 90 AEROSOL, METERED RESPIRATORY (INHALATION) at 22:30

## 2019-08-21 RX ADMIN — SENNOSIDES, DOCUSATE SODIUM 2 TABLET: 50; 8.6 TABLET, FILM COATED ORAL at 16:40

## 2019-08-21 RX ADMIN — OXYCODONE HYDROCHLORIDE 10 MG: 5 TABLET ORAL at 02:41

## 2019-08-21 RX ADMIN — OXYCODONE HYDROCHLORIDE 10 MG: 5 TABLET ORAL at 20:51

## 2019-08-21 RX ADMIN — ENOXAPARIN SODIUM 40 MG: 100 INJECTION SUBCUTANEOUS at 05:22

## 2019-08-21 RX ADMIN — ROSUVASTATIN CALCIUM 40 MG: 20 TABLET, FILM COATED ORAL at 05:24

## 2019-08-21 RX ADMIN — OXYCODONE HYDROCHLORIDE 10 MG: 5 TABLET ORAL at 08:15

## 2019-08-21 RX ADMIN — ANTACID TABLETS 500 MG: 500 TABLET, CHEWABLE ORAL at 16:40

## 2019-08-21 RX ADMIN — AMIODARONE HYDROCHLORIDE 200 MG: 200 TABLET ORAL at 05:24

## 2019-08-21 RX ADMIN — IPRATROPIUM BROMIDE AND ALBUTEROL SULFATE 3 ML: .5; 3 SOLUTION RESPIRATORY (INHALATION) at 11:03

## 2019-08-21 RX ADMIN — IPRATROPIUM BROMIDE AND ALBUTEROL SULFATE 3 ML: .5; 3 SOLUTION RESPIRATORY (INHALATION) at 18:36

## 2019-08-21 RX ADMIN — IPRATROPIUM BROMIDE AND ALBUTEROL SULFATE 3 ML: .5; 3 SOLUTION RESPIRATORY (INHALATION) at 06:31

## 2019-08-21 RX ADMIN — IPRATROPIUM BROMIDE AND ALBUTEROL SULFATE 3 ML: .5; 3 SOLUTION RESPIRATORY (INHALATION) at 15:49

## 2019-08-21 RX ADMIN — OXYCODONE HYDROCHLORIDE 10 MG: 5 TABLET ORAL at 12:33

## 2019-08-21 RX ADMIN — LISINOPRIL 2.5 MG: 2.5 TABLET ORAL at 05:22

## 2019-08-21 RX ADMIN — FUROSEMIDE 40 MG: 40 TABLET ORAL at 05:23

## 2019-08-21 RX ADMIN — POTASSIUM CHLORIDE 10 MEQ: 20 TABLET, EXTENDED RELEASE ORAL at 05:23

## 2019-08-21 RX ADMIN — TIOTROPIUM BROMIDE 1 CAPSULE: 18 CAPSULE ORAL; RESPIRATORY (INHALATION) at 05:23

## 2019-08-21 ASSESSMENT — ENCOUNTER SYMPTOMS
TREMORS: 0
BRUISES/BLEEDS EASILY: 1
BLOOD IN STOOL: 0
DOUBLE VISION: 0
NECK PAIN: 0
COUGH: 0
ABDOMINAL PAIN: 0
FEVER: 0
SHORTNESS OF BREATH: 0
BACK PAIN: 0
DIARRHEA: 0
MYALGIAS: 0
HEADACHES: 0
CONSTIPATION: 0
PALPITATIONS: 0
FOCAL WEAKNESS: 0
INSOMNIA: 0
BLURRED VISION: 0
HEMOPTYSIS: 0
ORTHOPNEA: 0
CHILLS: 0
WEIGHT LOSS: 0
NAUSEA: 0
MYALGIAS: 1
HEARTBURN: 0
CLAUDICATION: 0
NERVOUS/ANXIOUS: 1
SPUTUM PRODUCTION: 0
DIZZINESS: 0
DEPRESSION: 0
VOMITING: 0

## 2019-08-21 NOTE — CARE PLAN
Problem: Bowel/Gastric:  Goal: Normal bowel function is maintained or improved  Outcome: PROGRESSING AS EXPECTED  Note:   Pt had one LARGE bowel movement s/p an enema earlier today     Problem: Knowledge Deficit  Goal: Knowledge of the prescribed therapeutic regimen will improve  Outcome: PROGRESSING SLOWER THAN EXPECTED  Note:   Pt is refusing x-ray unless it is done in the room., see previous note

## 2019-08-21 NOTE — PROGRESS NOTES
Internal Medicine Interval Note  Note Author: Evelyn Mtz M.D.     Name Joshua Medrano     1965   Age/Sex 53 y.o. male   MRN [id number]   Code Status Full     After 5PM or if no immediate response to page, please call for cross-coverage  Attending/Team: Dr. Menendez/ Tomas See Patient List for primary contact information  Call [phone] to page    1st Call - Day Intern (R1):   Dr. Milner 2nd Call - Day Sr. Resident (R2/R3):   Dr. Mtz       Reason for interval visit  Left lower extremity hematoma    Interval Problem Daily Status Update  Large BM yesterday afternoon after enema.  Tolerated discontinuation of IV morphine. Pain well tolerated on PO oxycodone.  Poor participation with physical therapy, post-acute care recommended. OT evaluation requested.  Ortho reiterated no plan for surgical intervention. Warfarin restarted on .    Review of Systems   Constitutional: Negative for chills, fever, malaise/fatigue and weight loss.   HENT: Negative for congestion and hearing loss.    Eyes: Negative for blurred vision and double vision.   Respiratory: Negative for cough, hemoptysis, sputum production and shortness of breath.    Cardiovascular: Positive for leg swelling (Left ). Negative for chest pain, palpitations, orthopnea and claudication.   Gastrointestinal: Negative for abdominal pain, blood in stool, constipation, diarrhea, heartburn, nausea and vomiting.   Genitourinary: Negative for frequency and urgency.   Musculoskeletal: Positive for joint pain (Left LE ). Negative for back pain, myalgias and neck pain.   Skin: Positive for itching (BLLE) and rash (BLLE).   Neurological: Negative for dizziness and tremors.   Endo/Heme/Allergies: Negative for environmental allergies. Bruises/bleeds easily.   Psychiatric/Behavioral: Negative for depression. The patient does not have insomnia.      Disposition/Barriers to discharge:   Discharge planning and placement    Consultants/Specialty  Ortho  surgery    PCP: Nam Le M.D.    Quality Measures  Quality-Core Measures   Reviewed items::  EKG reviewed, Labs reviewed, Medications reviewed and Radiology images reviewed  Ceballos catheter::  No Ceballos  DVT prophylaxis pharmacological::  Enoxaparin (Lovenox)  DVT prophylaxis - mechanical:  Contra-indicated  Ulcer Prophylaxis::  Not indicated  Assessed for rehabilitation services:  Patient was assess for and/or received rehabilitation services during this hospitalization      Physical Exam     Vitals:    08/21/19 0632 08/21/19 0749 08/21/19 1103 08/21/19 1507   BP:  112/64  105/64   Pulse: 80 75  70   Resp: 18 17 16 16   Temp:  36.9 °C (98.4 °F)  36.5 °C (97.7 °F)   TempSrc:  Temporal  Temporal   SpO2: 94% 95% 95% 95%   Weight:       Height:         Body mass index is 38.48 kg/m².   Oxygen Therapy:  Pulse Oximetry: 95 %, O2 (LPM): 2, FiO2%: 21 %, O2 Delivery: None (Room Air)    Physical Exam   Constitutional: He is oriented to person, place, and time. No distress.   HENT:   Head: Normocephalic and atraumatic.   Eyes: Pupils are equal, round, and reactive to light. Conjunctivae and EOM are normal.   Neck: Normal range of motion. Neck supple.   Cardiovascular: Normal rate, regular rhythm, normal heart sounds and intact distal pulses.   No murmur heard.  Pulses:       Dorsalis pedis pulses are 2+ on the left side.        Posterior tibial pulses are 2+ on the left side.   Pulmonary/Chest: Effort normal and breath sounds normal. No respiratory distress. He has no wheezes. He has no rales. He exhibits no tenderness.   Abdominal: Soft. Bowel sounds are normal. Distention: obese. There is no tenderness.   Musculoskeletal: Normal range of motion. He exhibits edema (left lower extremity, improving) and tenderness.   Neurological: He is alert and oriented to person, place, and time. No cranial nerve deficit. Gait normal. Coordination normal.   Skin: Skin is warm and dry. He is not diaphoretic. There is erythema (left lower  extremity ).   Psychiatric: Mood and affect normal.       Assessment/Plan     * Traumatic hematoma of left lower leg- (present on admission)  Assessment & Plan  Pt was on warfarin with supratherapeutic INR 4.55 on admission. Initial concern for compartment syndrome but not present per orthopedic surgeon, recommended conservative measures and would evacuate hematoma as soon as pt's INR is reversed. He received Vit-K and FFP. INR was subsequently subtherapeutic to 1.3. Restarted warfarin on 8/20.    Plan  Repeat INR tomorrow   Hot and cold compress with leg elevation.   Pain control: PRN oxycodone  Mobilization with PT  OT eval  Will likely require SNF or inpatient rehab    Constipation  Assessment & Plan  Patient had been receiving opioids and refusing bowel regimen. Resolved on 8/20 with senna/docusate, miralax, and enema, followed by large BM in afternoon.  -continue bowel regimen while taking opioids    Heart failure with preserved ejection fraction, borderline, class IV (HCC)- (present on admission)  Assessment & Plan  HF is chronic and not acutely decompensated. Stable. Last EF is 55%, pt has mild concentric left ventricular hypertrophy, enlarged right atrium and severely dilated right ventricle. Extremities with strong pulse and well perfused.    Plan  Continue lisinopril, furosemide, metoprolol, rosuvastatin, spironolactone    CAD (coronary artery disease)- (present on admission)  Assessment & Plan  S/p CABG x2 (LIMA to LAD and SVG to OM), on Statin, lisinopril.    Plan  Continue home medications    Chronic obstructive pulmonary disease with acute exacerbation (HCC)- (present on admission)  Assessment & Plan  Stable. Hct 59.2 On Spiriva, albuterol, and duonebs.    Plan  Continue home spiriva, albuterol and duonebs    Left leg swelling- (present on admission)  Assessment & Plan  Questionable diagnosis of cellulitis earlier this admission, determined to not be infection. WBC ct is 25.7, on exam pt's leg looked  erythematous and tender to touch, he was started on ceftriaxone in the ED, which was discontinued on 8/15/19. That evening blood cultures with gram positive rods on gram stain. Initially started on vancomycin, but result thought to be a contaminant. Abx discontinued.     Plan  Monitor for improvement or signs of recurrent bleeding with restarting anticoagulation    Paroxysmal atrial fibrillation (HCC)- (present on admission)  Assessment & Plan  Stable on amiodarone.    Plan  Continue home amiodarone

## 2019-08-21 NOTE — PROGRESS NOTES
Inpatient Anticoagulation Service Note    Date: 8/21/2019    Reason for Anticoagulation: Atrial Fibrillation   Target INR: 2.0 to 3.0         Hemoglobin Value: 15.1  Hematocrit Value: 51.2    INR from last 7 days     Date/Time INR Value    08/21/19 0706  (!) 1.19    08/20/19 0057  (!) 1.22    08/19/19 0046  (!) 1.29    08/18/19 0255  (!) 1.36    08/17/19 0722  (!) 1.35    08/15/19 2356  (!) 1.33    08/15/19 0056  (!) 4.55        Dose from last 7 days     Date/Time Dose (mg)    08/21/19 1629  7.5    08/20/19 2223  7.5          Bridge Therapy: No   Reversal Agent Administered: Vitamin K  Intravenous    HPI: 52 yo male admitted on 8/14/19 with LE hematoma. Ortho consulted, no surgical intervention. Patient received vitamin K IV and FFP on 8/15.  Patient is chronically anticoagulated with warfarin therapy for h/o pAF. Warfarin is managed outpatient by the [facility] and per records, patient is prescribed warfarin 7.5mg po daily. INR supratherapeutic on admission. TTR (time in therapeutic range) outpatient is ~ 45%.    Assessment: INR subtherapeutic 1.19 from 1.22 - warfarin on hold since admission re-initiated last night.  Hgb and Plts are WNL.    Significant Interactions: Amiodarone, Statin - chronic medications should be established interactions.   Regular diet ordered      Plan: Continue warfarin 7.5mg po daily and monitor closely - INR daily.     Education Material Provided?: No    Pharmacist suggested discharge dosing: Warfarin 7.5mg po daily.      Ilsa Arcos, PharmD

## 2019-08-21 NOTE — CARE PLAN
Problem: Venous Thromboembolism (VTW)/Deep Vein Thrombosis (DVT) Prevention:  Goal: Patient will participate in Venous Thrombosis (VTE)/Deep Vein Thrombosis (DVT)Prevention Measures  Outcome: PROGRESSING AS EXPECTED  Educated patient on risk and importance of DVT prophylaxis.       Problem: Knowledge Deficit  Goal: Knowledge of disease process/condition, treatment plan, diagnostic tests, and medications will improve  Outcome: PROGRESSING AS EXPECTED  Patient agreeable to attend x-ray at 12:30 today. Provided education on importance.

## 2019-08-21 NOTE — PROGRESS NOTES
Assumed care of pt from Curtis Hylton. Pt has 8/10 complaints of pain at this time. Pt is sittig in bed with no O2 on. This RN's pulse ox showed 92%. Agreed to leave pt on RA only if he call when he feel short of breath. (pt was on 3 L previous)  Call light within reach, treaded slipper socks on, bed locked in lowest position. Mobility and fall risk assessed. Bed alarm has been refused.

## 2019-08-21 NOTE — PROGRESS NOTES
Pt requires the bed alarm for being a moderate fall risk. Pt refuses the use of a bed alarm despite education, charge RN notified

## 2019-08-21 NOTE — PROGRESS NOTES
Inpatient Anticoagulation Service Note    Date: 8/20/2019    Reason for Anticoagulation: Atrial Fibrillation   Target INR: 2.0 to 3.0         Hemoglobin Value: 16.1  Hematocrit Value: (!) 55.6    INR from last 7 days     Date/Time INR Value    08/20/19 0057  (!) 1.22    08/19/19 0046  (!) 1.29    08/18/19 0255  (!) 1.36    08/17/19 0722  (!) 1.35    08/15/19 2356  (!) 1.33    08/15/19 0056  (!) 4.55        Dose from last 7 days     Date/Time Dose (mg)    08/20/19 2223  7.5        Significant Interactions: Amiodarone, Statin  Bridge Therapy: No     Reversal Agent Administered: Vitamin K  Intravenous  Comments: Warfarin resumed by MD.  Patient takes warfarin 7.5 mg daily at home.  Currently patient is receiving prophylactic lovenox, recommend stopping this once INR is therapeutic.      Plan:  Warfarin 7.5 mg daily.  Check INR with AM labs  Education Material Provided?: No  Pharmacist suggested discharge dosing: Warfarin 7.5 mg daily.  Follow up INR within 3 days of discharge     Rafi Rodrigez, PharmD

## 2019-08-21 NOTE — NON-PROVIDER
Internal Medicine Interval Note  Note Author: Haseeb Painter, Student     Name Joshua Medrano     1965   Age/Sex 53 y.o. male   MRN [id number]   Code Status Full     After 5PM or if no immediate response to page, please call for cross-coverage  Attending/Team: Gemma/Tomas See Patient List for primary contact information  Call [phone] to page    1st Call - Day Intern (R1):   Annia 2nd Call - Day Sr. Resident (R2/R3):   Smith         Reason for interval visit  (Principal Problem)   Left lower leg hematoma      Interval Problem Daily Status Update  (24 hours, problem oriented, brief subjective history, new lab/imaging data pertinent to that problem)   Patient was lying comfortably in bed this morning. He reports having a large bowel movement yesterday as well as some hesitation about the abdominal x-ray this morning in light of the recent trauma he experienced with imaging. He reported 8/10 pain in his LLE but was in no acute distress. He says he averages between 6.5-10/10. He says the pain is well controlled with medication but he has yet to get out of bed and try walking. He said PT saw him yesterday and gave him a couple things to work on in bed. He raised his left leg well off the table for me to see his improvement in strength. On exam, vital signs are normal. Labs show INR 1.22.     Review of Systems   Constitutional: Negative for chills and fever.   Respiratory: Negative for shortness of breath.    Cardiovascular: Negative for chest pain and leg swelling.   Gastrointestinal: Negative for constipation, nausea and vomiting.   Genitourinary: Negative for dysuria, frequency and urgency.   Musculoskeletal: Positive for myalgias (left leg, upper and lower).   Neurological: Negative for focal weakness and headaches.   Psychiatric/Behavioral: Negative for depression. The patient is nervous/anxious (nervous about KUB).          Disposition/Barriers to discharge:   Inpatient for OT  evaluation    Consultants/Specialty    PCP: Nam Le M.D.      Quality Measures  Quality-Core Measures   Reviewed items::  Labs reviewed and Medications reviewed  DVT prophylaxis pharmacological::  Enoxaparin (Lovenox)          Physical Exam       Vitals:    08/21/19 0400 08/21/19 0632 08/21/19 0749 08/21/19 1103   BP: 107/66  112/64    Pulse: 80 80 75    Resp: 18 18 17 16   Temp: 36.2 °C (97.2 °F)  36.9 °C (98.4 °F)    TempSrc: Temporal  Temporal    SpO2: 94% 94% 95% 95%   Weight:       Height:         Body mass index is 38.48 kg/m².   Oxygen Therapy:  Pulse Oximetry: 95 %, O2 (LPM): 2, FiO2%: 21 %, O2 Delivery: Silicone Nasal Cannula    Physical Exam   Constitutional: He is oriented to person, place, and time. No distress.   HENT:   Head: Normocephalic and atraumatic.   Eyes: Pupils are equal, round, and reactive to light. EOM are normal.   Neck: Normal range of motion. Neck supple.   Cardiovascular: Normal rate, regular rhythm and normal heart sounds.   Pulses:       Dorsalis pedis pulses are 2+ on the right side, and 2+ on the left side.   Pulmonary/Chest: Effort normal and breath sounds normal. No respiratory distress.   Abdominal: Soft. Bowel sounds are normal. He exhibits no distension. There is no tenderness.   Musculoskeletal: Normal range of motion.        Left lower leg: He exhibits tenderness and swelling. He exhibits no edema.        Legs:  Neurological: He is alert and oriented to person, place, and time. He has normal motor skills and normal sensation.   Skin: Skin is warm and dry.   Psychiatric: Mood and affect normal.             Assessment/Plan     1. Traumatic hematoma of left lower leg - pain is under control; sensation, pulses and capillary refill are all normal, no signs of compartment syndrome; mobility is limited due to pain  - Per ortho assessment, patient does not need hematoma evacuation; recommend medical management  - Resume warfarin 7.5 mg tab once daily; will refer to outpatient  anticoagulation clinic for follow up, especially in light of him being admitted with supratherapeutic INR; may need dosage adjustment  - Per PT, may need d/c to acute rehab center  - Ordered OT evaluation/treatment for further assessment  - Inquired about acute rehab, need OT eval (in addition to PT eval) for him to be considered a candidate; OT eval pending  - Disposition dependent on OT assessment  - In the meantime, encourage mobility, monitor ability  - Continue to alternate hot and cold compression with leg elevation  - For pain, oxycodone PO 5-10mg q4h PRN     2. Heart failure with preserved ejection fraction - (present on admission)   Chronic, not acutely decompensated. Last EF 55%, patient has mild concentric left ventricular hypertrophy, enlarged right atrium and severely dilated right ventricle. Currently, extremities with strong pulses and are well perfused.  - Continue home medications, including lisinopril, furosemide, metoprolol, rosuvastatin, sprionolactone     3. CAD - (present on admission)  S/p CABG x2 (LIMA to LAD and SVG to OM), on statin, lisinopril  - Continue home meds     4. COPD - (present on admission)  Stable. Hct 50.8 this morning. Currently on tiotropium, albuterol and Duoneb.  - Continue home meds     5. Left leg swelling - (present on admission)  Questionable cellulitis on admission. Then, WBC 25.7 and patient's leg looked erythematous and was tender on palpation. Started on ceftriaxone empirically in ED, but discontinued soon afterwards. That evening blood cultures showed gram positive rods, so patient was started on Vancomycin empirically. Results thought to be contaminant, so vanco was d/c'd as well.   - Discontinued antibiotics     6. Paroxysmal atrial fibrillation - (present on admission)   Stable on amiodarone.  - Continue amiodarone  - Will resume warfarin for thromboembolism prevention as patient will no longer undergo surgical removal of hematoma

## 2019-08-21 NOTE — PROGRESS NOTES
Received report from night shift and assumed care. Assessment completed, POC discussed. Pt is A&Ox4. Complains of pain 8/10 to LLE, medication given per MAR. Left lower calf red and swollen. Pt agreeable to go to x-ray today via gurney around 12:30 pm. All other needs met at this time. Safety precautions and hourly rounding in place.     1200: received call from [facility] team. MD states that pt will not need x-ray any longer due to having BM yesterday.

## 2019-08-21 NOTE — ASSESSMENT & PLAN NOTE
Patient had been receiving opioids and refusing bowel regimen.Patient had a BM on 8/20 with laxatives and he didn't pass any stool upto today.   Patient refusing bowel regimen for constipation.  He is for discharge home after BM.

## 2019-08-21 NOTE — PROGRESS NOTES
"Pt aware of need for x-ray. However, pt is refuisng ambulation to the [facility] d/t pain. Pt also refuses being taken to xray via bed as he marques have to ambulate to the machine. Per imaging, the order can be completed with a portable machine, however, the quality would be very poor and \"a waste of everyone's time.\"  "

## 2019-08-22 LAB
BASOPHILS # BLD AUTO: 1 % (ref 0–1.8)
BASOPHILS # BLD: 0.19 K/UL (ref 0–0.12)
EOSINOPHIL # BLD AUTO: 0.36 K/UL (ref 0–0.51)
EOSINOPHIL NFR BLD: 1.8 % (ref 0–6.9)
ERYTHROCYTE [DISTWIDTH] IN BLOOD BY AUTOMATED COUNT: 75.8 FL (ref 35.9–50)
HCT VFR BLD AUTO: 52.6 % (ref 42–52)
HGB BLD-MCNC: 15.7 G/DL (ref 14–18)
IMM GRANULOCYTES # BLD AUTO: 0.33 K/UL (ref 0–0.11)
IMM GRANULOCYTES NFR BLD AUTO: 1.7 % (ref 0–0.9)
INR PPP: 1.31 (ref 0.87–1.13)
LYMPHOCYTES # BLD AUTO: 0.52 K/UL (ref 1–4.8)
LYMPHOCYTES NFR BLD: 2.6 % (ref 22–41)
MCH RBC QN AUTO: 24.3 PG (ref 27–33)
MCHC RBC AUTO-ENTMCNC: 29.8 G/DL (ref 33.7–35.3)
MCV RBC AUTO: 81.3 FL (ref 81.4–97.8)
MONOCYTES # BLD AUTO: 1.31 K/UL (ref 0–0.85)
MONOCYTES NFR BLD AUTO: 6.6 % (ref 0–13.4)
MORPHOLOGY BLD-IMP: NORMAL
NEUTROPHILS # BLD AUTO: 17.2 K/UL (ref 1.82–7.42)
NEUTROPHILS NFR BLD: 86.3 % (ref 44–72)
NRBC # BLD AUTO: 0 K/UL
NRBC BLD-RTO: 0 /100 WBC
PLATELET # BLD AUTO: 346 K/UL (ref 164–446)
PMV BLD AUTO: 9.2 FL (ref 9–12.9)
PROTHROMBIN TIME: 16.6 SEC (ref 12–14.6)
RBC # BLD AUTO: 6.47 M/UL (ref 4.7–6.1)
WBC # BLD AUTO: 19.9 K/UL (ref 4.8–10.8)

## 2019-08-22 PROCEDURE — 700111 HCHG RX REV CODE 636 W/ 250 OVERRIDE (IP): Performed by: INTERNAL MEDICINE

## 2019-08-22 PROCEDURE — 700102 HCHG RX REV CODE 250 W/ 637 OVERRIDE(OP): Performed by: STUDENT IN AN ORGANIZED HEALTH CARE EDUCATION/TRAINING PROGRAM

## 2019-08-22 PROCEDURE — 700102 HCHG RX REV CODE 250 W/ 637 OVERRIDE(OP): Performed by: INTERNAL MEDICINE

## 2019-08-22 PROCEDURE — A9270 NON-COVERED ITEM OR SERVICE: HCPCS | Performed by: STUDENT IN AN ORGANIZED HEALTH CARE EDUCATION/TRAINING PROGRAM

## 2019-08-22 PROCEDURE — 700101 HCHG RX REV CODE 250: Performed by: INTERNAL MEDICINE

## 2019-08-22 PROCEDURE — 99232 SBSQ HOSP IP/OBS MODERATE 35: CPT | Mod: GC | Performed by: INTERNAL MEDICINE

## 2019-08-22 PROCEDURE — 36415 COLL VENOUS BLD VENIPUNCTURE: CPT

## 2019-08-22 PROCEDURE — 94640 AIRWAY INHALATION TREATMENT: CPT

## 2019-08-22 PROCEDURE — 770006 HCHG ROOM/CARE - MED/SURG/GYN SEMI*

## 2019-08-22 PROCEDURE — 85025 COMPLETE CBC W/AUTO DIFF WBC: CPT

## 2019-08-22 PROCEDURE — A9270 NON-COVERED ITEM OR SERVICE: HCPCS | Performed by: INTERNAL MEDICINE

## 2019-08-22 PROCEDURE — 94760 N-INVAS EAR/PLS OXIMETRY 1: CPT

## 2019-08-22 PROCEDURE — 85610 PROTHROMBIN TIME: CPT

## 2019-08-22 PROCEDURE — 97166 OT EVAL MOD COMPLEX 45 MIN: CPT

## 2019-08-22 RX ORDER — CALCIUM CARBONATE 500 MG/1
500 TABLET, CHEWABLE ORAL 3 TIMES DAILY PRN
Status: DISCONTINUED | OUTPATIENT
Start: 2019-08-22 | End: 2019-08-24 | Stop reason: HOSPADM

## 2019-08-22 RX ADMIN — ANTACID TABLETS 500 MG: 500 TABLET, CHEWABLE ORAL at 21:13

## 2019-08-22 RX ADMIN — METOPROLOL SUCCINATE 25 MG: 25 TABLET, EXTENDED RELEASE ORAL at 05:44

## 2019-08-22 RX ADMIN — OXYCODONE HYDROCHLORIDE 10 MG: 5 TABLET ORAL at 07:44

## 2019-08-22 RX ADMIN — IPRATROPIUM BROMIDE AND ALBUTEROL SULFATE 3 ML: .5; 3 SOLUTION RESPIRATORY (INHALATION) at 19:03

## 2019-08-22 RX ADMIN — IPRATROPIUM BROMIDE AND ALBUTEROL SULFATE 3 ML: .5; 3 SOLUTION RESPIRATORY (INHALATION) at 10:46

## 2019-08-22 RX ADMIN — ANTACID TABLETS 500 MG: 500 TABLET, CHEWABLE ORAL at 10:49

## 2019-08-22 RX ADMIN — IPRATROPIUM BROMIDE AND ALBUTEROL SULFATE 3 ML: .5; 3 SOLUTION RESPIRATORY (INHALATION) at 08:17

## 2019-08-22 RX ADMIN — WARFARIN SODIUM 7.5 MG: 7.5 TABLET ORAL at 17:48

## 2019-08-22 RX ADMIN — FUROSEMIDE 40 MG: 40 TABLET ORAL at 05:44

## 2019-08-22 RX ADMIN — AMIODARONE HYDROCHLORIDE 200 MG: 200 TABLET ORAL at 05:44

## 2019-08-22 RX ADMIN — ROSUVASTATIN CALCIUM 40 MG: 20 TABLET, FILM COATED ORAL at 05:44

## 2019-08-22 RX ADMIN — IPRATROPIUM BROMIDE AND ALBUTEROL SULFATE 3 ML: .5; 3 SOLUTION RESPIRATORY (INHALATION) at 15:40

## 2019-08-22 RX ADMIN — OXYCODONE HYDROCHLORIDE 10 MG: 5 TABLET ORAL at 14:29

## 2019-08-22 RX ADMIN — IPRATROPIUM BROMIDE AND ALBUTEROL SULFATE 3 ML: .5; 3 SOLUTION RESPIRATORY (INHALATION) at 01:12

## 2019-08-22 RX ADMIN — ALBUTEROL SULFATE 2 PUFF: 90 AEROSOL, METERED RESPIRATORY (INHALATION) at 05:47

## 2019-08-22 RX ADMIN — SENNOSIDES, DOCUSATE SODIUM 2 TABLET: 50; 8.6 TABLET, FILM COATED ORAL at 05:44

## 2019-08-22 RX ADMIN — ENOXAPARIN SODIUM 40 MG: 100 INJECTION SUBCUTANEOUS at 05:48

## 2019-08-22 RX ADMIN — POTASSIUM CHLORIDE 10 MEQ: 20 TABLET, EXTENDED RELEASE ORAL at 05:45

## 2019-08-22 RX ADMIN — SENNOSIDES, DOCUSATE SODIUM 2 TABLET: 50; 8.6 TABLET, FILM COATED ORAL at 17:48

## 2019-08-22 RX ADMIN — LISINOPRIL 2.5 MG: 2.5 TABLET ORAL at 05:44

## 2019-08-22 RX ADMIN — ALBUTEROL SULFATE 2 PUFF: 90 AEROSOL, METERED RESPIRATORY (INHALATION) at 15:32

## 2019-08-22 RX ADMIN — SPIRONOLACTONE 25 MG: 25 TABLET ORAL at 05:44

## 2019-08-22 RX ADMIN — FERROUS SULFATE TAB 325 MG (65 MG ELEMENTAL FE) 325 MG: 325 (65 FE) TAB at 07:44

## 2019-08-22 RX ADMIN — TIOTROPIUM BROMIDE 1 CAPSULE: 18 CAPSULE ORAL; RESPIRATORY (INHALATION) at 05:48

## 2019-08-22 RX ADMIN — ALBUTEROL SULFATE 2 PUFF: 90 AEROSOL, METERED RESPIRATORY (INHALATION) at 21:55

## 2019-08-22 RX ADMIN — ANTACID TABLETS 500 MG: 500 TABLET, CHEWABLE ORAL at 14:29

## 2019-08-22 ASSESSMENT — COGNITIVE AND FUNCTIONAL STATUS - GENERAL
TOILETING: A LITTLE
SUGGESTED CMS G CODE MODIFIER DAILY ACTIVITY: CJ
DAILY ACTIVITIY SCORE: 20
DRESSING REGULAR LOWER BODY CLOTHING: A LOT
HELP NEEDED FOR BATHING: A LITTLE

## 2019-08-22 ASSESSMENT — ENCOUNTER SYMPTOMS
ORTHOPNEA: 0
NAUSEA: 0
EYE REDNESS: 0
FALLS: 1
DIZZINESS: 0
FEVER: 0
HEMOPTYSIS: 0
PND: 0
SENSORY CHANGE: 0
SHORTNESS OF BREATH: 0
COUGH: 0
NECK PAIN: 0
MYALGIAS: 1
FLANK PAIN: 0
POLYDIPSIA: 0
HEARTBURN: 1
BRUISES/BLEEDS EASILY: 0
PHOTOPHOBIA: 0
FOCAL WEAKNESS: 0
WEAKNESS: 0
CONSTIPATION: 0
BLURRED VISION: 0
HEADACHES: 0
PALPITATIONS: 0
CHILLS: 0
ABDOMINAL PAIN: 0
DIAPHORESIS: 0
SPUTUM PRODUCTION: 0
WHEEZING: 0
DOUBLE VISION: 0
BACK PAIN: 0
VOMITING: 0
TREMORS: 0

## 2019-08-22 ASSESSMENT — ACTIVITIES OF DAILY LIVING (ADL): TOILETING: INDEPENDENT

## 2019-08-22 NOTE — PROGRESS NOTES
Received report from night shift and assumed care. Assessment completed, POC discussed. Pt is A&Ox4. Complains of lower leg pain 8/10. Medications given per MAR. Pt also complains of heartburn and reports heartburn throughout the night. Paged [facility] regarding this, MD agreeable to PRN Tums. Patient allowed lab to draw this AM. All other needs met at this time. Safety precautions and hourly rounding in place.     0900: Paged [facility] regarding patient's current bed rest order. PT can not see patient until this order is lifted.     1200: paged OT and left message per MD request    1400: OT in to see patient

## 2019-08-22 NOTE — CARE PLAN
Problem: Discharge Barriers/Planning  Goal: Patient's continuum of care needs will be met  Outcome: PROGRESSING AS EXPECTED  Patient needs to be seen by OT prior to going home per MD. OT paged, patient aware.    Problem: Respiratory:  Goal: Respiratory status will improve  Outcome: PROGRESSING AS EXPECTED  Patient updated on yellow colored sputum and cough. No new orders at this time. Will continue to monitor.

## 2019-08-22 NOTE — CARE PLAN
Problem: Pain Management  Goal: Pain level will decrease to patient's comfort goal  Outcome: PROGRESSING AS EXPECTED    Patient complained of pain. PRN pain medication given. Patient on bed resting comfortably. Will continue to monitor pain q 2 hrs.      Problem: Respiratory:  Goal: Respiratory status will improve  Outcome: PROGRESSING AS EXPECTED    Patient on O2 at 2 LPM via NC, tolerating well. Not in respiratory distress. PRN respiratory treatments available.

## 2019-08-22 NOTE — PROGRESS NOTES
2110  Paged [facility] for patient requesting something for heartburn. Awaiting call back.    2130 2nd page made. Awaiting call back.

## 2019-08-22 NOTE — PROGRESS NOTES
Patient refusing bed alarm. A&OX4. Patient on bed rest. Education provided but continues to refuse. Verbalized understanding. Calls appropriately. Charge nurse notified.

## 2019-08-22 NOTE — PROGRESS NOTES
Patient on bed resting. Alert and oriented x 4. Able to make needs known. Assessment partially completed. On O2 at 3 LPM via nasal cannula, tolerating well.Complained of pain, PRN pain medication given. Patient educated regarding plan of care. Patient on bed rest, non weight bearing as endorsed. Ice packs refilled with ice per pt request.    Bed locked, in lowest position. Needs attended. No further needs at this time. Communication board updated. Call light and personal belongings within reach.

## 2019-08-22 NOTE — PROGRESS NOTES
Inpatient Anticoagulation Service Note    Date: 8/22/2019    Reason for Anticoagulation: Atrial Fibrillation   Target INR: 2.0 to 3.0         Hemoglobin Value: 15.7  Hematocrit Value: (!) 52.6    INR from last 7 days     Date/Time INR Value    08/22/19 0710  (!) 1.31    08/21/19 0706  (!) 1.19    08/20/19 0057  (!) 1.22    08/19/19 0046  (!) 1.29    08/18/19 0255  (!) 1.36    08/17/19 0722  (!) 1.35    08/15/19 2356  (!) 1.33        Dose from last 7 days     Date/Time Dose (mg)    08/22/19 0750  7.5    08/21/19 1629  7.5    08/20/19 2223  7.5        Significant Interactions: Amiodarone, Statin  Bridge Therapy: Yes(Lovenox 40mg SubQ daily)   Reversal Agent Administered: Vitamin K  Intravenous    HPI: 54 yo male admitted on 8/14/19 with LE hematoma. Ortho consulted, no surgical intervention. Patient received vitamin K IV and FFP on 8/15.  Patient is chronically anticoagulated with warfarin therapy for h/o pAF. Warfarin is managed outpatient by the [facility] and per records, patient is prescribed warfarin 7.5mg po daily. INR supratherapeutic on admission. TTR (time in therapeutic range) outpatient is ~ 45%.     Assessment: INR subtherapeutic, trending upward 1.31 from 1.19. Warfarin on hold since admission, re-initiated 8/20/19. Lovenox 40mg SubQ daily also active. Hgb and Plts remain WNL.    Significant Interactions: Amiodarone, Statin - chronic medications should be established interactions.   Regular diet ordered      Plan: Continue warfarin 7.5mg po daily without bolus dosing. INR daily.      Education Material Provided?: No     Pharmacist suggested discharge dosing: Warfarin 7.5mg po daily.      Ilsa Arcos, PharmD

## 2019-08-22 NOTE — THERAPY
Conflicting orders remain- one bed rest order and one activity order both placed at same time. As previous PT session reviewed all bed level mobility, no additional services can be provided until bedrest order lifted. Will hold PT treatment until this time.

## 2019-08-22 NOTE — DISCHARGE PLANNING
Care Transition Team Assessment    Information Source  Orientation : Oriented x 4  Information Given By: Patient  Who is responsible for making decisions for patient? : Patient    Readmission Evaluation  Is this a readmission?: No    Elopement Risk  Legal Hold: No  Ambulatory or Self Mobile in Wheelchair: No-Not an Elopement Risk  Elopement Risk: Not at Risk for Elopement    Interdisciplinary Discharge Planning  Lives with - Patient reports he lives in his own home with his wife, daughter, 5 grandchildren, and his nephew. He reports he lives on a 1-story home.    Self Care Capacity: Spouse  Patient or legal guardian wants to designate a caregiver (see row info): No  Housing / Facility: 1 Story House  Prior Services: None    Discharge Preparedness  What is your plan after discharge?: Uncertain - pending medical team collaboration. Client is open to discussing skilled nursing facilities if needed. Would like the team to discuss options with spouse.   What are your discharge supports?: Spouse  Prior Functional Level: Ambulatory, Independent with Activities of Daily Living, Independent with Medication Management  Difficulity with ADLs: None  Difficulity with IADLs: None    Functional Assesment  Prior Functional Level: Ambulatory, Independent with Activities of Daily Living, Independent with Medication Management    Finances  Financial Barriers to Discharge: Yes(Social Security Disablity 743.00)  Average Monthly Income: 743 $  Source of Income: Social Security Disability  Prescription Coverage: Yes    Vision / Hearing Impairment  Vision Impairment : No  Hearing Impairment : No         Advance Directive  Advance Directive?: None    Domestic Abuse  Have you ever been the victim of abuse or violence?: No  Physical Abuse or Sexual Abuse: No  Verbal Abuse or Emotional Abuse: No    Psychological Assessment  History of Substance Abuse: None  History of Psychiatric Problems: No  Non-compliant with Treatment: No  Newly Diagnosed  Illness: No    Discharge Risks or Barriers  Discharge risks or barriers?: No    Anticipated Discharge Information  Anticipated discharge disposition: SNF  Discharge Address: To be determined

## 2019-08-22 NOTE — PROGRESS NOTES
Internal Medicine Interval Note  Note Author: Ximena Milner M.D.     Name Joshua Medrano     1965   Age/Sex 53 y.o. male   MRN [id number]   Code Status Full     After 5PM or if no immediate response to page, please call for cross-coverage  Attending/Team: Dr Menendez/Tomas See Patient List for primary contact information  Call [phone] to page    1st Call - Day Intern (R1):   Dr Milner 2nd Call - Day Sr. Resident (R2/R3):   Dr Mtz         Reason for interval visit  (Principal Problem)   Left Lower extremity Hematoma      Interval Problem Daily Status Update  (24 hours, problem oriented, brief subjective history, new lab/imaging data pertinent to that problem)     S: 53 y old  Mr Medrano admitted with h/o left leg swelling on  after a fall and injured his left leg, which showed hematoma without compartmental syndrome and without any growth on blood cultures. He has no new overnight symptoms, apart from pain still in left leg which is better. H/O heart burning in the night he is on tums. Denies any fever, nausea, vomiting, belly pain. Says had a bowel movement yesterday but not much.    O: Patient sitting in bed in no distress, His left leg swollen, over the calf, looks better than yesterday. Still erythematous slight local raise of temperature, tenderness+ peripehral pulses present and sensation intact distally. Skin distally dry scaly.     Review of Systems   Constitutional: Negative for chills, diaphoresis, fever and malaise/fatigue.   HENT: Negative for congestion, ear pain and tinnitus.    Eyes: Negative for blurred vision, double vision, photophobia and redness.   Respiratory: Negative for cough, hemoptysis, sputum production, shortness of breath and wheezing.    Cardiovascular: Positive for leg swelling. Negative for chest pain, palpitations, orthopnea and PND.   Gastrointestinal: Positive for heartburn. Negative for abdominal pain, constipation, nausea and vomiting.    Genitourinary: Negative for dysuria, flank pain, frequency and urgency.   Musculoskeletal: Positive for falls, joint pain and myalgias. Negative for back pain and neck pain.   Skin:        Skin over the legs dry, scaly, and over the left calf erythematous, local raise of temperature, shiny.   Neurological: Negative for dizziness, tremors, sensory change, focal weakness, weakness and headaches.   Endo/Heme/Allergies: Negative for polydipsia. Does not bruise/bleed easily.       Disposition/Barriers to discharge:   Inpatient    Consultants/Specialty  Orthopedics  PT/OT    PCP: Nam Le M.D.      Quality Measures  Quality-Core Measures   Reviewed items::  Labs reviewed and Medications reviewed  Ceballos catheter::  No Ceballos  DVT prophylaxis pharmacological::  Warfarin (Coumadin) and Enoxaparin (Lovenox)  DVT prophylaxis - mechanical:  SCDs  Ulcer Prophylaxis::  Yes          Physical Exam       Vitals:    08/22/19 0112 08/22/19 0325 08/22/19 0705 08/22/19 0817   BP:  134/79 110/77    Pulse: 89 83 71 70   Resp: (!) 22 19 18 18   Temp:  36.6 °C (97.9 °F) 36.7 °C (98 °F)    TempSrc:  Temporal Temporal    SpO2: 96% 98% 98% 98%   Weight:       Height:         Body mass index is 38.48 kg/m².   Oxygen Therapy:  Pulse Oximetry: 98 %, O2 (LPM): 3, FiO2%: 21 %, O2 Delivery: Silicone Nasal Cannula    Physical Exam   Constitutional: He is oriented to person, place, and time and well-developed, well-nourished, and in no distress. No distress.   HENT:   Head: Normocephalic and atraumatic.   Eyes: Pupils are equal, round, and reactive to light. Conjunctivae and EOM are normal.   Neck: Normal range of motion. Neck supple.   Cardiovascular: Normal rate and regular rhythm.   Murmur heard.  Pulmonary/Chest: Effort normal and breath sounds normal. No respiratory distress. He has no wheezes. He has no rales.   Abdominal: Soft. Bowel sounds are normal. He exhibits no distension. There is no tenderness. There is no guarding.    Musculoskeletal: Normal range of motion. He exhibits tenderness. He exhibits no edema.   Neurological: He is alert and oriented to person, place, and time. No cranial nerve deficit. GCS score is 15.   Skin: Skin is warm. He is not diaphoretic.   Skin over the left calf shiny, erythematous and local raise of temperature.             Assessment/Plan     * Traumatic hematoma of left lower leg- (present on admission)  Assessment & Plan  Patient had a swelling of the left leg after a fall. Patient left leg dry, scaly, with hyperpigmentation over the distal half. With marked swelling over the proximal half. With shiny and erythematous skin and with local raise of temperature.  INR is 1.31. Continue warfarin.     Plan  Repeat INR tomorrow   Neurovascular checks q6h  Hot and cold compress with leg elevation.   Pain control: iv Morphine PRN and other pain regimen   Patient getting PT ,   For OT to evaluate.    Constipation  Assessment & Plan  Patient had a bowel movement yesterday also but not much. No complaints of abdominal pain and vomiting.          Heart failure with preserved ejection fraction, borderline, class IV (HCC)- (present on admission)  Assessment & Plan  HF is chronic and not acutely decompensated. Stable. Last EF is 55%, pt has mild concentric left ventricular hypertrophy, enlarged right atrium and severely dilated right ventricle. Extremities with strong pulse and well perfused.     Plan  Continue lisinopril, furosemide, metoprolol, rosuvastatin, spironolactone    CAD (coronary artery disease)- (present on admission)  Assessment & Plan  S/p CABG x2 (LIMA to LAD and SVG to OM), on Statin, lisinopril.     Plan  Continue home medications    Chronic obstructive pulmonary disease with acute exacerbation (HCC)- (present on admission)  Assessment & Plan  Stable. On Spiriva, albuterol, and duonebs.    Paroxysmal atrial fibrillation (HCC)- (present on admission)  Assessment & Plan  Stable on  amiodarone.     Plan  Continue home amiodarone

## 2019-08-23 LAB
ANION GAP SERPL CALC-SCNC: 8 MMOL/L (ref 0–11.9)
BASOPHILS # BLD AUTO: 0.9 % (ref 0–1.8)
BASOPHILS # BLD: 0.19 K/UL (ref 0–0.12)
BUN SERPL-MCNC: 19 MG/DL (ref 8–22)
CALCIUM SERPL-MCNC: 10.1 MG/DL (ref 8.5–10.5)
CHLORIDE SERPL-SCNC: 93 MMOL/L (ref 96–112)
CO2 SERPL-SCNC: 25 MMOL/L (ref 20–33)
CREAT SERPL-MCNC: 0.75 MG/DL (ref 0.5–1.4)
EOSINOPHIL # BLD AUTO: 0.24 K/UL (ref 0–0.51)
EOSINOPHIL NFR BLD: 1.1 % (ref 0–6.9)
ERYTHROCYTE [DISTWIDTH] IN BLOOD BY AUTOMATED COUNT: 74.4 FL (ref 35.9–50)
GLUCOSE SERPL-MCNC: 149 MG/DL (ref 65–99)
HCT VFR BLD AUTO: 56 % (ref 42–52)
HGB BLD-MCNC: 16.9 G/DL (ref 14–18)
IMM GRANULOCYTES # BLD AUTO: 0.35 K/UL (ref 0–0.11)
IMM GRANULOCYTES NFR BLD AUTO: 1.7 % (ref 0–0.9)
INR PPP: 1.88 (ref 0.87–1.13)
LYMPHOCYTES # BLD AUTO: 0.53 K/UL (ref 1–4.8)
LYMPHOCYTES NFR BLD: 2.5 % (ref 22–41)
MCH RBC QN AUTO: 24.2 PG (ref 27–33)
MCHC RBC AUTO-ENTMCNC: 30.2 G/DL (ref 33.7–35.3)
MCV RBC AUTO: 80.1 FL (ref 81.4–97.8)
MONOCYTES # BLD AUTO: 1.19 K/UL (ref 0–0.85)
MONOCYTES NFR BLD AUTO: 5.6 % (ref 0–13.4)
MORPHOLOGY BLD-IMP: NORMAL
NEUTROPHILS # BLD AUTO: 18.6 K/UL (ref 1.82–7.42)
NEUTROPHILS NFR BLD: 88.2 % (ref 44–72)
NRBC # BLD AUTO: 0 K/UL
NRBC BLD-RTO: 0 /100 WBC
PLATELET # BLD AUTO: 371 K/UL (ref 164–446)
PMV BLD AUTO: 9 FL (ref 9–12.9)
POTASSIUM SERPL-SCNC: 4.6 MMOL/L (ref 3.6–5.5)
PROTHROMBIN TIME: 22.2 SEC (ref 12–14.6)
RBC # BLD AUTO: 6.99 M/UL (ref 4.7–6.1)
SODIUM SERPL-SCNC: 126 MMOL/L (ref 135–145)
WBC # BLD AUTO: 21.1 K/UL (ref 4.8–10.8)

## 2019-08-23 PROCEDURE — A9270 NON-COVERED ITEM OR SERVICE: HCPCS | Performed by: STUDENT IN AN ORGANIZED HEALTH CARE EDUCATION/TRAINING PROGRAM

## 2019-08-23 PROCEDURE — 85610 PROTHROMBIN TIME: CPT

## 2019-08-23 PROCEDURE — 36415 COLL VENOUS BLD VENIPUNCTURE: CPT

## 2019-08-23 PROCEDURE — 700101 HCHG RX REV CODE 250: Performed by: INTERNAL MEDICINE

## 2019-08-23 PROCEDURE — 97530 THERAPEUTIC ACTIVITIES: CPT

## 2019-08-23 PROCEDURE — A9270 NON-COVERED ITEM OR SERVICE: HCPCS | Performed by: INTERNAL MEDICINE

## 2019-08-23 PROCEDURE — 94760 N-INVAS EAR/PLS OXIMETRY 1: CPT

## 2019-08-23 PROCEDURE — 700102 HCHG RX REV CODE 250 W/ 637 OVERRIDE(OP): Performed by: STUDENT IN AN ORGANIZED HEALTH CARE EDUCATION/TRAINING PROGRAM

## 2019-08-23 PROCEDURE — 85025 COMPLETE CBC W/AUTO DIFF WBC: CPT

## 2019-08-23 PROCEDURE — 700111 HCHG RX REV CODE 636 W/ 250 OVERRIDE (IP): Performed by: INTERNAL MEDICINE

## 2019-08-23 PROCEDURE — 700102 HCHG RX REV CODE 250 W/ 637 OVERRIDE(OP): Performed by: INTERNAL MEDICINE

## 2019-08-23 PROCEDURE — 770006 HCHG ROOM/CARE - MED/SURG/GYN SEMI*

## 2019-08-23 PROCEDURE — 94640 AIRWAY INHALATION TREATMENT: CPT

## 2019-08-23 PROCEDURE — 99232 SBSQ HOSP IP/OBS MODERATE 35: CPT | Mod: GC | Performed by: INTERNAL MEDICINE

## 2019-08-23 PROCEDURE — 80048 BASIC METABOLIC PNL TOTAL CA: CPT

## 2019-08-23 RX ORDER — WARFARIN SODIUM 5 MG/1
5 TABLET ORAL
Status: DISCONTINUED | OUTPATIENT
Start: 2019-08-23 | End: 2019-08-24

## 2019-08-23 RX ADMIN — POTASSIUM CHLORIDE 10 MEQ: 20 TABLET, EXTENDED RELEASE ORAL at 09:48

## 2019-08-23 RX ADMIN — IPRATROPIUM BROMIDE AND ALBUTEROL SULFATE 3 ML: .5; 3 SOLUTION RESPIRATORY (INHALATION) at 14:15

## 2019-08-23 RX ADMIN — SPIRONOLACTONE 25 MG: 25 TABLET ORAL at 09:47

## 2019-08-23 RX ADMIN — LISINOPRIL 2.5 MG: 2.5 TABLET ORAL at 09:47

## 2019-08-23 RX ADMIN — IPRATROPIUM BROMIDE AND ALBUTEROL SULFATE 3 ML: .5; 3 SOLUTION RESPIRATORY (INHALATION) at 10:48

## 2019-08-23 RX ADMIN — SENNOSIDES, DOCUSATE SODIUM 2 TABLET: 50; 8.6 TABLET, FILM COATED ORAL at 17:40

## 2019-08-23 RX ADMIN — ANTACID TABLETS 500 MG: 500 TABLET, CHEWABLE ORAL at 17:40

## 2019-08-23 RX ADMIN — IPRATROPIUM BROMIDE AND ALBUTEROL SULFATE 3 ML: .5; 3 SOLUTION RESPIRATORY (INHALATION) at 03:43

## 2019-08-23 RX ADMIN — OXYCODONE HYDROCHLORIDE 10 MG: 5 TABLET ORAL at 03:04

## 2019-08-23 RX ADMIN — SENNOSIDES, DOCUSATE SODIUM 2 TABLET: 50; 8.6 TABLET, FILM COATED ORAL at 09:50

## 2019-08-23 RX ADMIN — OXYCODONE HYDROCHLORIDE 10 MG: 5 TABLET ORAL at 09:49

## 2019-08-23 RX ADMIN — ALBUTEROL SULFATE 2 PUFF: 90 AEROSOL, METERED RESPIRATORY (INHALATION) at 22:02

## 2019-08-23 RX ADMIN — FUROSEMIDE 40 MG: 40 TABLET ORAL at 09:49

## 2019-08-23 RX ADMIN — OXYCODONE HYDROCHLORIDE 10 MG: 5 TABLET ORAL at 21:56

## 2019-08-23 RX ADMIN — ANTACID TABLETS 500 MG: 500 TABLET, CHEWABLE ORAL at 09:47

## 2019-08-23 RX ADMIN — IPRATROPIUM BROMIDE AND ALBUTEROL SULFATE 3 ML: .5; 3 SOLUTION RESPIRATORY (INHALATION) at 07:34

## 2019-08-23 RX ADMIN — ENOXAPARIN SODIUM 40 MG: 100 INJECTION SUBCUTANEOUS at 09:46

## 2019-08-23 RX ADMIN — IPRATROPIUM BROMIDE AND ALBUTEROL SULFATE 3 ML: .5; 3 SOLUTION RESPIRATORY (INHALATION) at 20:18

## 2019-08-23 RX ADMIN — METOPROLOL SUCCINATE 25 MG: 25 TABLET, EXTENDED RELEASE ORAL at 09:48

## 2019-08-23 RX ADMIN — FERROUS SULFATE TAB 325 MG (65 MG ELEMENTAL FE) 325 MG: 325 (65 FE) TAB at 09:47

## 2019-08-23 RX ADMIN — ROSUVASTATIN CALCIUM 40 MG: 20 TABLET, FILM COATED ORAL at 09:49

## 2019-08-23 RX ADMIN — ALBUTEROL SULFATE 2 PUFF: 90 AEROSOL, METERED RESPIRATORY (INHALATION) at 02:58

## 2019-08-23 RX ADMIN — WARFARIN SODIUM 5 MG: 5 TABLET ORAL at 17:40

## 2019-08-23 RX ADMIN — AMIODARONE HYDROCHLORIDE 200 MG: 200 TABLET ORAL at 09:49

## 2019-08-23 RX ADMIN — TIOTROPIUM BROMIDE 1 CAPSULE: 18 CAPSULE ORAL; RESPIRATORY (INHALATION) at 07:35

## 2019-08-23 RX ADMIN — ALBUTEROL SULFATE 2 PUFF: 90 AEROSOL, METERED RESPIRATORY (INHALATION) at 17:43

## 2019-08-23 ASSESSMENT — COGNITIVE AND FUNCTIONAL STATUS - GENERAL
CLIMB 3 TO 5 STEPS WITH RAILING: A LOT
SUGGESTED CMS G CODE MODIFIER MOBILITY: CK
STANDING UP FROM CHAIR USING ARMS: A LITTLE
WALKING IN HOSPITAL ROOM: A LITTLE
MOVING FROM LYING ON BACK TO SITTING ON SIDE OF FLAT BED: A LITTLE
MOVING TO AND FROM BED TO CHAIR: A LITTLE
TURNING FROM BACK TO SIDE WHILE IN FLAT BAD: A LITTLE
MOBILITY SCORE: 17

## 2019-08-23 ASSESSMENT — ENCOUNTER SYMPTOMS
ORTHOPNEA: 0
BLOOD IN STOOL: 0
HEMOPTYSIS: 0
CHILLS: 0
MYALGIAS: 1
PALPITATIONS: 0
HEARTBURN: 1
CLAUDICATION: 0
STRIDOR: 0
ABDOMINAL PAIN: 0
BLURRED VISION: 0
DOUBLE VISION: 0
EYE PAIN: 0
FEVER: 0
WHEEZING: 0
VOMITING: 0
NAUSEA: 0
FLANK PAIN: 0
EYE REDNESS: 0
DIARRHEA: 0
COUGH: 0
DIAPHORESIS: 0

## 2019-08-23 ASSESSMENT — CHA2DS2 SCORE
CHA2DS2 VASC SCORE: 2
DIABETES: NO
PRIOR STROKE OR TIA OR THROMBOEMBOLISM: NO
AGE 75 OR GREATER: NO
CHF OR LEFT VENTRICULAR DYSFUNCTION: YES
AGE 65 TO 74: NO
VASCULAR DISEASE: YES
HYPERTENSION: NO
SEX: MALE

## 2019-08-23 ASSESSMENT — GAIT ASSESSMENTS
ASSISTIVE DEVICE: FRONT WHEEL WALKER
GAIT LEVEL OF ASSIST: SUPERVISED
DISTANCE (FEET): 8

## 2019-08-23 NOTE — DISCHARGE PLANNING
Anticipated Discharge Disposition: Skilled/H/H      Action: SW spoke to patient about d/c planning and recommendations of therapies.  Patient made it very clear he wants to go home,as he has enough help at home.  SW requested permission to speak to his spouse to discuss recommendations with her.  Patient gave permission, patient is also open to H/H.    PC to spouse, Orly [phone]: message left to call SW to discuss d/c planning    Barriers to Discharge: placement    Plan: follow up with spouse

## 2019-08-23 NOTE — DISCHARGE PLANNING
Medical Social Work  PC from spouse, verified that she and  have talked about going to a skilled and they are against it. It appears the patient has been to several and has had bad experienced with them.  Does not feel patient will benefit from going, they are open to H/H though, [facility].    Faxed [facility] to [facility]    Paged [facility], to discuss family wish, H/H order and d/c.

## 2019-08-23 NOTE — PROGRESS NOTES
Inpatient Anticoagulation Service Note    Date: 8/23/2019    Reason for Anticoagulation: Atrial Fibrillation   Target INR: 2.0 to 3.0  [id number] VASc Score: 2  HAS-BLED Score: 1   Hemoglobin Value: 16.9  Hematocrit Value: (!) 56    INR from last 7 days     Date/Time INR Value    08/23/19 1030  (!) 1.88    08/22/19 0710  (!) 1.31    08/21/19 0706  (!) 1.19    08/20/19 0057  (!) 1.22    08/19/19 0046  (!) 1.29    08/18/19 0255  (!) 1.36    08/17/19 0722  (!) 1.35        Dose from last 7 days     Date/Time Dose (mg)    08/23/19 1451  5    08/22/19 0750  7.5    08/21/19 1629  7.5    08/20/19 2223  7.5        Significant Interactions: Amiodarone, Statin  Bridge Therapy: No  Reversal Agent Administered: Vitamin K  Intravenous  Comments: Warfarin continued from home for h/o pAF.  Supratherapeutic INR on admit required IV Vit K reversal.  No further bleeding noted per chart review; H/H stable.  D/W [facility], VTE PPX with lovenox 40mg no longer necessary.  Will continue home regimen of 5mg daily and monitor closely.    Plan:  Warfarin 5mg.  INR in AM.  Education Material Provided?: No(Chronic warfarin patient)  Pharmacist suggested discharge dosing: Warfarin 7.5mg daily if able to follow up within 72hrs of discharge.     Fidencio Rios

## 2019-08-23 NOTE — THERAPY
"Physical Therapy Treatment completed.   Bed Mobility:  Supine to Sit: Supervised  Transfers: Sit to Stand: Supervised  Gait: Level Of Assist: Supervised with Front-Wheel Walker 8ft      Plan of Care: Will benefit from Physical Therapy 3 times per week  Discharge Recommendations: Equipment: Wheelchair. Post-acute therapy Recommend home health transitional care for continued physical therapy services.     Pt initially resistant to getting out of bed. Extensive education regarding negative impact of bed rest and how LE will not improved if he doesn't mobilize. Long discussion regarding his refusal of SNF yet refusal to mobilize in acute. Pt eventually agreeable to stand using FWW. Once standing able to side step laterally without assist adequate distance for a transfer to chair or commode. Once sitting pt became very emotional and expressing grief over his status and comorbidities. Provided with active listening and emotional support while educating on importance on participating in daily tasks in order to improve to the mobility level which he desires. Pt appreciative and at least verablized he will increase his participation. Discussed home set up and pt assured therapist a w/c can fit in his home. Given this pt has demonstrated ability to discharge home at a wheelchair level of function and can receive  home health therapies for gait training. Acute PT to continue to folow while in house.     See \"Rehab Therapy-Acute\" Patient Summary Report for complete documentation.       "

## 2019-08-23 NOTE — PROGRESS NOTES
Patient is alert and oriented x 4. ON bed rest. Able to make needs known. Assessment partially completed. On O2 at 3 LPM via nasal cannula, tolerating well. Denies any pain and discomfort at this time. Patient educated regarding plan of care.    Bed locked, in lowest position. Needs attended. No further needs at this time. Communication board updated. Call light and personal belongings within reach.

## 2019-08-23 NOTE — CARE PLAN
Problem: Safety  Goal: Will remain free from falls  Outcome: PROGRESSING AS EXPECTED  Intervention: Assess risk factors for falls  Note:   Pt assisted during ambulation.      Problem: Knowledge Deficit  Goal: Knowledge of disease process/condition, treatment plan, diagnostic tests, and medications will improve  Outcome: PROGRESSING AS EXPECTED  Intervention: Assess knowledge level of disease process/condition, treatment plan, diagnostic tests, and medications  Note:   RN revieded the plan of care with the pt. Pt understand plan of care today to ambulate with PT.

## 2019-08-23 NOTE — THERAPY
"Occupational Therapy Evaluation completed.   Functional Status:  SPV supine>sit, SPV sit>stand, unable to side step, ModA LB dress, SPV UB dress, refused ambulation, refused toilet txf  Plan of Care: Will benefit from Occupational Therapy 3 times per week  Discharge Recommendations:  Equipment: Will Continue to Assess for Equipment Needs. Post-acute therapy Recommend post-acute placement for additional occupational therapy services prior to discharge home. Patient can tolerate post-acute therapies at a 5x/week frequency.    See \"Rehab Therapy-Acute\" Patient Summary Report for complete documentation.    Pt is 52yo male admitted with L LE hematoma. Pt presents to OT eval limited by pain, refused functional mobility or toilet txf stating \"I can't walk on it.\" Pt refused side-stepping even when cued for foot pivot for lateral progression on R LE, instead scooted himself higher in bed from seated position. Pt very resistant to OT recommendations, became slightly agitated with position of FWW to assist with sit>stand, was adamant that he would use the windowsill to stabilize himself instead of the FWW. Pt refused recommendations for additional therapy, pt adamant about returning home and is unrealistic about amount of assist required at home if he cannot walk or transfer onto a toilet. Anticipate pt will be able to demo BSC txf at next OT tx, at that point he may potentially be safe to d/c home at wheelchair level if pt obtains a BSC. However, at current level with pt refusing mobility would recommend post-acute placement as pt is high fall risk and adequate support at home is not confirmed.   "

## 2019-08-23 NOTE — PROGRESS NOTES
Internal Medicine Interval Note  Note Author: Ximena Milner M.D.     Name Joshua Medrano     1965   Age/Sex 53 y.o. male   MRN [id number]   Code Status Full Code     After 5PM or if no immediate response to page, please call for cross-coverage  Attending/Team: Dr Menendez/Tomas See Patient List for primary contact information  Call [phone] to page    1st Call - Day Intern (R1):   Dr Milner 2nd Call - Day Sr. Resident (R2/R3):   Dr Mtz         Reason for interval visit  (Principal Problem)   Left lower extremity hematoma.      Interval Problem Daily Status Update  (24 hours, problem oriented, brief subjective history, new lab/imaging data pertinent to that problem)      S: 53 y old  Mr Medrano admitted with h/o left leg swelling on  after a fall and injured his left leg, which showed hematoma without compartmental syndrome and without any growth on blood cultures. He has no new overnight symptoms, apart from pain still in left leg which is better. H/O heart burning in the night he is on tums. Denies any fever, nausea, vomiting, belly pain. Says had a bowel movement yesterday but not much.     O: Patient sitting in bed in no distress, His left leg swollen, over the calf, looks better than yesterday. Still erythematous slight local raise of temperature, tenderness+ peripehral pulses present and sensation intact distally. Skin distally dry scaly.     Getting PT/Ot evaluations advising for SNF but patient is against it Don't want to go any SNF want to go home only.     Review of Systems   Constitutional: Negative for chills, diaphoresis, fever and malaise/fatigue.   HENT: Negative for congestion, ear pain, hearing loss and tinnitus.    Eyes: Negative for blurred vision, double vision, pain and redness.   Respiratory: Negative for cough, hemoptysis, wheezing and stridor.    Cardiovascular: Positive for leg swelling. Negative for chest pain, palpitations, orthopnea and claudication.    Gastrointestinal: Positive for heartburn. Negative for abdominal pain, blood in stool, diarrhea, nausea and vomiting.   Genitourinary: Negative for dysuria, flank pain, frequency and urgency.   Musculoskeletal: Positive for joint pain and myalgias.        Left Calf swollen and tender , with local raise of temperature, erythematous and shiny skin.   Skin: Positive for rash. Negative for itching.        Skin over the legs is dry, scaly,        Disposition/Barriers to discharge:   Inpatient/left leg hematoma with skilled nursing care needed.    Consultants/Specialty  Orthopedics    PCP: Nam Le M.D.      Quality Measures  Quality-Core Measures   Reviewed items::  Labs reviewed and Medications reviewed  Ceballos catheter::  No Ceballos  DVT prophylaxis pharmacological::  Warfarin (Coumadin)  DVT prophylaxis - mechanical:  SCDs  Ulcer Prophylaxis::  Yes          Physical Exam       Vitals:    08/23/19 0344 08/23/19 0735 08/23/19 0800 08/23/19 1048   BP:   105/70    Pulse: 84 72 83 84   Resp: (!) 22 18 19 18   Temp:   36.4 °C (97.5 °F)    TempSrc:   Temporal    SpO2: 94% 96% 97% 96%   Weight:       Height:         Body mass index is 38.48 kg/m².   Oxygen Therapy:  Pulse Oximetry: 96 %, O2 (LPM): 2, O2 Delivery: Nasal Cannula    Physical Exam   Constitutional: He is oriented to person, place, and time and well-developed, well-nourished, and in no distress. No distress.   HENT:   Head: Normocephalic and atraumatic.   Eyes: Pupils are equal, round, and reactive to light. Conjunctivae and EOM are normal.   Neck: Normal range of motion. Neck supple. No thyromegaly present.   Cardiovascular: Normal rate and regular rhythm.   Murmur heard.  Pulmonary/Chest: Effort normal and breath sounds normal. No respiratory distress. He has no wheezes. He has no rales.   Abdominal: Soft. Bowel sounds are normal. He exhibits no distension.   Musculoskeletal: Normal range of motion. He exhibits edema and tenderness.   Left calf swollen and  tender with local raise of temperature. And shiny skin.   Neurological: He is alert and oriented to person, place, and time. GCS score is 15.   Skin: Skin is warm. Rash noted. He is not diaphoretic. There is erythema.             Assessment/Plan     * Traumatic hematoma of left lower leg- (present on admission)  Assessment & Plan  Patient had a swelling of the left leg after a fall. Patient left leg dry, scaly, with hyperpigmentation over the distal half. With marked swelling over the proximal half. With shiny and erythematous skin and with local raise of temperature.  INR is 1.88. Continue warfarin.     Plan  Repeat INR tomorrow   Neurovascular checks q6h  Hot and cold compress with leg elevation.   Pain control: iv Morphine PRN and other pain regimen   PT and Ot recommendations for a SNF but patient is not willing for that suggestion.    Constipation  Assessment & Plan  Patient didn't have a bowel movement yesterday. No complaints of abdominal pain and vomiting.          Heart failure with preserved ejection fraction, borderline, class IV (HCC)- (present on admission)  Assessment & Plan  HF is chronic and not acutely decompensated. Stable. Last EF is 55%, pt has mild concentric left ventricular hypertrophy, enlarged right atrium and severely dilated right ventricle. Extremities with strong pulse and well perfused.     Plan  Continue lisinopril, furosemide, metoprolol, rosuvastatin, spironolactone    CAD (coronary artery disease)- (present on admission)  Assessment & Plan  S/p CABG x2 (LIMA to LAD and SVG to OM), on Statin, lisinopril.     Plan  Continue home medications    Chronic obstructive pulmonary disease with acute exacerbation (HCC)- (present on admission)  Assessment & Plan  Stable. On Spiriva, albuterol, and duonebs.    Paroxysmal atrial fibrillation (HCC)- (present on admission)  Assessment & Plan  Stable on amiodarone.     Plan  Continue home amiodarone

## 2019-08-23 NOTE — PROGRESS NOTES
Patient refusing bed alarm. A&OX4. On bed rest. Education provided but continues to refuse. Verbalized understanding. Calls appropriately. Charge nurse notified.

## 2019-08-23 NOTE — PROGRESS NOTES
Patient requesting medications to be given later with breakfast. Meds rescheduled. Will endorse to day shift RN.

## 2019-08-24 VITALS
DIASTOLIC BLOOD PRESSURE: 54 MMHG | HEIGHT: 77 IN | OXYGEN SATURATION: 96 % | TEMPERATURE: 97.4 F | RESPIRATION RATE: 18 BRPM | WEIGHT: 315 LBS | HEART RATE: 73 BPM | BODY MASS INDEX: 37.19 KG/M2 | SYSTOLIC BLOOD PRESSURE: 100 MMHG

## 2019-08-24 LAB
ANISOCYTOSIS BLD QL SMEAR: ABNORMAL
BASOPHILS # BLD AUTO: 1.1 % (ref 0–1.8)
BASOPHILS # BLD: 0.25 K/UL (ref 0–0.12)
COMMENT 1642: NORMAL
EOSINOPHIL # BLD AUTO: 0.28 K/UL (ref 0–0.51)
EOSINOPHIL NFR BLD: 1.2 % (ref 0–6.9)
ERYTHROCYTE [DISTWIDTH] IN BLOOD BY AUTOMATED COUNT: 74.7 FL (ref 35.9–50)
HCT VFR BLD AUTO: 55.9 % (ref 42–52)
HGB BLD-MCNC: 17.5 G/DL (ref 14–18)
IMM GRANULOCYTES # BLD AUTO: 0.44 K/UL (ref 0–0.11)
IMM GRANULOCYTES NFR BLD AUTO: 1.9 % (ref 0–0.9)
INR PPP: 2.41 (ref 0.87–1.13)
LYMPHOCYTES # BLD AUTO: 0.75 K/UL (ref 1–4.8)
LYMPHOCYTES NFR BLD: 3.2 % (ref 22–41)
MCH RBC QN AUTO: 25.3 PG (ref 27–33)
MCHC RBC AUTO-ENTMCNC: 31.3 G/DL (ref 33.7–35.3)
MCV RBC AUTO: 80.9 FL (ref 81.4–97.8)
MICROCYTES BLD QL SMEAR: ABNORMAL
MONOCYTES # BLD AUTO: 1.32 K/UL (ref 0–0.85)
MONOCYTES NFR BLD AUTO: 5.7 % (ref 0–13.4)
MORPHOLOGY BLD-IMP: NORMAL
NEUTROPHILS # BLD AUTO: 20.14 K/UL (ref 1.82–7.42)
NEUTROPHILS NFR BLD: 86.9 % (ref 44–72)
NRBC # BLD AUTO: 0 K/UL
NRBC BLD-RTO: 0 /100 WBC
PLATELET # BLD AUTO: 382 K/UL (ref 164–446)
PLATELET BLD QL SMEAR: NORMAL
PMV BLD AUTO: 8.8 FL (ref 9–12.9)
PROTHROMBIN TIME: 27.1 SEC (ref 12–14.6)
RBC # BLD AUTO: 6.91 M/UL (ref 4.7–6.1)
RBC BLD AUTO: PRESENT
WBC # BLD AUTO: 23.2 K/UL (ref 4.8–10.8)

## 2019-08-24 PROCEDURE — 36415 COLL VENOUS BLD VENIPUNCTURE: CPT

## 2019-08-24 PROCEDURE — A9270 NON-COVERED ITEM OR SERVICE: HCPCS | Performed by: INTERNAL MEDICINE

## 2019-08-24 PROCEDURE — 700102 HCHG RX REV CODE 250 W/ 637 OVERRIDE(OP): Performed by: INTERNAL MEDICINE

## 2019-08-24 PROCEDURE — A9270 NON-COVERED ITEM OR SERVICE: HCPCS | Performed by: STUDENT IN AN ORGANIZED HEALTH CARE EDUCATION/TRAINING PROGRAM

## 2019-08-24 PROCEDURE — 85025 COMPLETE CBC W/AUTO DIFF WBC: CPT

## 2019-08-24 PROCEDURE — 700102 HCHG RX REV CODE 250 W/ 637 OVERRIDE(OP): Performed by: STUDENT IN AN ORGANIZED HEALTH CARE EDUCATION/TRAINING PROGRAM

## 2019-08-24 PROCEDURE — 94640 AIRWAY INHALATION TREATMENT: CPT

## 2019-08-24 PROCEDURE — 94760 N-INVAS EAR/PLS OXIMETRY 1: CPT

## 2019-08-24 PROCEDURE — 85610 PROTHROMBIN TIME: CPT

## 2019-08-24 PROCEDURE — 700101 HCHG RX REV CODE 250: Performed by: INTERNAL MEDICINE

## 2019-08-24 PROCEDURE — 99239 HOSP IP/OBS DSCHRG MGMT >30: CPT | Mod: GC | Performed by: INTERNAL MEDICINE

## 2019-08-24 RX ORDER — WARFARIN SODIUM 2.5 MG/1
2.5 TABLET ORAL
Status: COMPLETED | OUTPATIENT
Start: 2019-08-24 | End: 2019-08-24

## 2019-08-24 RX ORDER — OXYCODONE HYDROCHLORIDE 5 MG/1
5-10 TABLET ORAL EVERY 4 HOURS PRN
Qty: 30 TAB | Refills: 0 | Status: SHIPPED | OUTPATIENT
Start: 2019-08-24 | End: 2019-08-31

## 2019-08-24 RX ORDER — OXYCODONE HYDROCHLORIDE 5 MG/1
5-10 TABLET ORAL EVERY 4 HOURS PRN
Qty: 30 TAB | Refills: 0 | Status: SHIPPED | OUTPATIENT
Start: 2019-08-24 | End: 2019-08-24

## 2019-08-24 RX ORDER — LABETALOL HYDROCHLORIDE 5 MG/ML
10 INJECTION, SOLUTION INTRAVENOUS EVERY 4 HOURS PRN
Status: DISCONTINUED | OUTPATIENT
Start: 2019-08-24 | End: 2019-08-24 | Stop reason: CLARIF

## 2019-08-24 RX ORDER — WARFARIN SODIUM 5 MG/1
5 TABLET ORAL
Status: DISCONTINUED | OUTPATIENT
Start: 2019-08-25 | End: 2019-08-24 | Stop reason: HOSPADM

## 2019-08-24 RX ADMIN — BISACODYL 10 MG: 10 SUPPOSITORY RECTAL at 14:46

## 2019-08-24 RX ADMIN — ANTACID TABLETS 500 MG: 500 TABLET, CHEWABLE ORAL at 17:17

## 2019-08-24 RX ADMIN — FUROSEMIDE 40 MG: 40 TABLET ORAL at 04:02

## 2019-08-24 RX ADMIN — SPIRONOLACTONE 25 MG: 25 TABLET ORAL at 04:01

## 2019-08-24 RX ADMIN — POTASSIUM CHLORIDE 10 MEQ: 20 TABLET, EXTENDED RELEASE ORAL at 04:01

## 2019-08-24 RX ADMIN — ALBUTEROL SULFATE 2 PUFF: 90 AEROSOL, METERED RESPIRATORY (INHALATION) at 03:40

## 2019-08-24 RX ADMIN — AMIODARONE HYDROCHLORIDE 200 MG: 200 TABLET ORAL at 04:01

## 2019-08-24 RX ADMIN — OXYCODONE HYDROCHLORIDE 10 MG: 5 TABLET ORAL at 03:33

## 2019-08-24 RX ADMIN — OXYCODONE HYDROCHLORIDE 10 MG: 5 TABLET ORAL at 17:13

## 2019-08-24 RX ADMIN — ALBUTEROL SULFATE 2 PUFF: 90 AEROSOL, METERED RESPIRATORY (INHALATION) at 10:30

## 2019-08-24 RX ADMIN — WARFARIN SODIUM 2.5 MG: 2.5 TABLET ORAL at 17:17

## 2019-08-24 RX ADMIN — FERROUS SULFATE TAB 325 MG (65 MG ELEMENTAL FE) 325 MG: 325 (65 FE) TAB at 08:17

## 2019-08-24 RX ADMIN — TIOTROPIUM BROMIDE 1 CAPSULE: 18 CAPSULE ORAL; RESPIRATORY (INHALATION) at 04:02

## 2019-08-24 RX ADMIN — ALBUTEROL SULFATE 2 PUFF: 90 AEROSOL, METERED RESPIRATORY (INHALATION) at 14:32

## 2019-08-24 RX ADMIN — ANTACID TABLETS 500 MG: 500 TABLET, CHEWABLE ORAL at 12:35

## 2019-08-24 RX ADMIN — METOPROLOL SUCCINATE 25 MG: 25 TABLET, EXTENDED RELEASE ORAL at 04:01

## 2019-08-24 RX ADMIN — IPRATROPIUM BROMIDE AND ALBUTEROL SULFATE 3 ML: .5; 3 SOLUTION RESPIRATORY (INHALATION) at 03:55

## 2019-08-24 RX ADMIN — IPRATROPIUM BROMIDE AND ALBUTEROL SULFATE 3 ML: .5; 3 SOLUTION RESPIRATORY (INHALATION) at 15:47

## 2019-08-24 RX ADMIN — SENNOSIDES, DOCUSATE SODIUM 2 TABLET: 50; 8.6 TABLET, FILM COATED ORAL at 04:01

## 2019-08-24 RX ADMIN — ROSUVASTATIN CALCIUM 40 MG: 20 TABLET, FILM COATED ORAL at 04:00

## 2019-08-24 RX ADMIN — OXYCODONE HYDROCHLORIDE 10 MG: 5 TABLET ORAL at 10:27

## 2019-08-24 RX ADMIN — LISINOPRIL 2.5 MG: 2.5 TABLET ORAL at 04:01

## 2019-08-24 ASSESSMENT — ENCOUNTER SYMPTOMS
BRUISES/BLEEDS EASILY: 0
CHILLS: 0
CONSTIPATION: 1
MYALGIAS: 0
NECK PAIN: 0
PHOTOPHOBIA: 0
LOSS OF CONSCIOUSNESS: 0
TREMORS: 0
DIARRHEA: 0
EYE REDNESS: 0
HEADACHES: 0
EYE PAIN: 0
ABDOMINAL PAIN: 0
WHEEZING: 0
WEIGHT LOSS: 0
HEARTBURN: 0
PALPITATIONS: 0
BLURRED VISION: 0
COUGH: 0
HEMOPTYSIS: 0
DOUBLE VISION: 0
TINGLING: 0
NAUSEA: 0
VOMITING: 0
FEVER: 0
WEAKNESS: 0
DIZZINESS: 0
FOCAL WEAKNESS: 0
ORTHOPNEA: 0
DIAPHORESIS: 0

## 2019-08-24 NOTE — PROGRESS NOTES
Patient alert/oriented x4,moderate risk for fall,refused bed alarm despite education,call light and personal belongings within t=reach and bed in low position.

## 2019-08-24 NOTE — CARE PLAN
Problem: Pain Management  Goal: Pain level will decrease to patient's comfort goal  Outcome: PROGRESSING AS EXPECTED   Patient called for pain meds ,pain given with releif.  Problem: Safety  Goal: Will remain free from injury  Outcome: PROGRESSING SLOWER THAN EXPECTED   Call light and personal belongings within reach and bed in low position,patient call for assistance.

## 2019-08-24 NOTE — CARE PLAN
Problem: Infection  Goal: Will remain free from infection  Outcome: PROGRESSING AS EXPECTED  Intervention: Assess signs and symptoms of infection  Note:   Pt MEWS score and vital signs do not indicate infection.      Problem: Safety  Goal: Will remain free from falls  Intervention: Assess risk factors for falls  Note:   Pt is a fall risk he is aware of the importance of getting help from staff during mobilization.

## 2019-08-24 NOTE — PROGRESS NOTES
Internal Medicine Interval Note  Note Author: Ximena Milner M.D.     Name Joshua Medrano     1965   Age/Sex 53 y.o. male   MRN [id number]   Code Status Full Code     After 5PM or if no immediate response to page, please call for cross-coverage  Attending/Team: Dr Menendez/Tomas See Patient List for primary contact information  Call [phone] to page    1st Call - Day Intern (R1):   Dr Milner 2nd Call - Day Sr. Resident (R2/R3):   Dr Mtz         Reason for interval visit  (Principal Problem)     Left Lower Extremity Hematoma.    Interval Problem Daily Status Update  (24 hours, problem oriented, brief subjective history, new lab/imaging data pertinent to that problem)      53 y old  Mr Medrano admitted with h/o left leg swelling on  after a fall and injured his left leg, which showed hematoma without compartmental syndrome and without any growth on blood cultures. He has no new overnight symptoms, apart from pain still in left leg which is better.  Denies any fever, nausea, vomiting, belly pain. Patient didn't have a bowel movement. Patient is anxious to go home. He is frustrated about SNF and staying here.Patient refuses SNF, explained the consequences. Patient will be discharged after he passes stool today. He will be followed in hemooncology out patient clinic for leucocytosis and high hemoglobin.     Review of Systems   Constitutional: Negative for chills, diaphoresis, fever and weight loss.   HENT: Negative for ear pain, hearing loss and tinnitus.    Eyes: Negative for blurred vision, double vision, photophobia, pain and redness.   Respiratory: Negative for cough, hemoptysis and wheezing.    Cardiovascular: Positive for leg swelling. Negative for chest pain, palpitations and orthopnea.   Gastrointestinal: Positive for constipation. Negative for abdominal pain, diarrhea, heartburn, nausea and vomiting.   Genitourinary: Negative for dysuria, frequency and urgency.    Musculoskeletal: Negative for joint pain, myalgias and neck pain.   Skin: Positive for rash. Negative for itching.   Neurological: Negative for dizziness, tingling, tremors, focal weakness, loss of consciousness, weakness and headaches.   Endo/Heme/Allergies: Negative for environmental allergies. Does not bruise/bleed easily.       Disposition/Barriers to discharge:   Inpatient/ Constipation    Consultants/Specialty  Orthopedics.    PCP: Nam Le M.D.      Quality Measures  Quality-Core Measures   Reviewed items::  Labs reviewed and Medications reviewed  Ceballos catheter::  No Ceballos  DVT prophylaxis pharmacological::  Warfarin (Coumadin)  DVT prophylaxis - mechanical:  SCDs  Ulcer Prophylaxis::  Yes          Physical Exam       Vitals:    08/24/19 0356 08/24/19 0400 08/24/19 0720 08/24/19 1000   BP:  112/67 103/67    Pulse: 71 100 68 74   Resp: 16 18 20 18   Temp:  36.1 °C (97 °F) 36.4 °C (97.6 °F)    TempSrc:  Temporal Temporal    SpO2: 97% 92% 95% 96%   Weight:       Height:         Body mass index is 38.48 kg/m².   Oxygen Therapy:  Pulse Oximetry: 96 %, O2 (LPM): 2, O2 Delivery: Silicone Nasal Cannula    Physical Exam   Constitutional: He is oriented to person, place, and time and well-developed, well-nourished, and in no distress. No distress.   HENT:   Head: Normocephalic and atraumatic.   Eyes: Pupils are equal, round, and reactive to light. Conjunctivae and EOM are normal.   Neck: Normal range of motion. Neck supple. No thyromegaly present.   Cardiovascular: Normal rate, regular rhythm and normal heart sounds.   Pulmonary/Chest: Effort normal and breath sounds normal. No respiratory distress. He has no wheezes. He has no rales.   Abdominal: Soft. Bowel sounds are normal. He exhibits no distension. There is no tenderness. There is no rebound and no guarding.   Musculoskeletal: Normal range of motion. He exhibits edema, tenderness and deformity.   Left lower extremity sewwling of the calf still present. But  better. Erythematous skin tenderness. Distal hyperpigmentation of the distal legs.   Neurological: He is alert and oriented to person, place, and time. He has normal reflexes. No cranial nerve deficit. GCS score is 15.   Skin: Skin is warm. Rash noted. He is not diaphoretic. There is erythema.             Assessment/Plan     * Traumatic hematoma of left lower leg- (present on admission)  Assessment & Plan  Pt was on warfarin with supratherapeutic INR 4.55 on admission. Initial concern for compartment syndrome but not present per orthopedic surgeon, recommended conservative measures and would evacuate hematoma as soon as pt's INR is reversed. He received Vit-K and FFP. INR was subsequently subtherapeutic to 1.3. Restarted warfarin on 8/20.   Patient is for SNF as per PT/OT recommendations but patient refused want to go home instead.    Plan  Hot and cold compress with leg elevation.   Pain control: PRN oxycodone.  Discharge home if the patient passes stool.   Patient to get Home health therapy  for gait training as per PT.    Constipation  Assessment & Plan  Patient had been receiving opioids and refusing bowel regimen.Patient had a BM on 8/20 with laxatives and he didn't pass any stool upto today.   Patient refusing bowel regimen for constipation.  He is for discharge home after BM.    Heart failure with preserved ejection fraction, borderline, class IV (HCC)- (present on admission)  Assessment & Plan  HF is chronic and not acutely decompensated. Stable. Last EF is 55%, pt has mild concentric left ventricular hypertrophy, enlarged right atrium and severely dilated right ventricle. Extremities with strong pulse and well perfused.    Plan  Continue lisinopril, furosemide, metoprolol, rosuvastatin, spironolactone    CAD (coronary artery disease)- (present on admission)  Assessment & Plan  S/p CABG x2 (LIMA to LAD and SVG to OM), on Statin, lisinopril.    Plan  Continue home medications    Chronic obstructive pulmonary  disease with acute exacerbation (HCC)- (present on admission)  Assessment & Plan  Stable. Hct 59.2 On Spiriva, albuterol, and duonebs.    Plan  Continue home spiriva, albuterol and duonebs    Left leg swelling- (present on admission)  Assessment & Plan  Questionable diagnosis of cellulitis earlier this admission, determined to not be infection. WBC ct is 25.7, on exam pt's leg looked erythematous and tender to touch, he was started on ceftriaxone in the ED, which was discontinued on 8/15/19. That evening blood cultures with gram positive rods on gram stain. Initially started on vancomycin, but result thought to be a contaminant. Abx discontinued.     Plan  Monitor for improvement or signs of recurrent bleeding with restarting anticoagulation    Paroxysmal atrial fibrillation (HCC)- (present on admission)  Assessment & Plan  Stable on amiodarone.    Plan  Continue home amiodarone

## 2019-08-24 NOTE — PROGRESS NOTES
Inpatient Anticoagulation Service Note    Date: 8/24/2019    Reason for Anticoagulation: Atrial Fibrillation   Target INR: 2.0 to 3.0  [id number] VASc Score: 2  HAS-BLED Score: 1   Hemoglobin Value: 17.5  Hematocrit Value: (!) 55.9    INR from last 7 days     Date/Time INR Value    08/24/19 0347  (!) 2.41    08/23/19 1030  (!) 1.88    08/22/19 0710  (!) 1.31    08/21/19 0706  (!) 1.19    08/20/19 0057  (!) 1.22    08/19/19 0046  (!) 1.29    08/18/19 0255  (!) 1.36        Dose from last 7 days     Date/Time Dose (mg)    08/24/19 0937  2.5    08/23/19 1520  5    08/23/19 1451  7.5    08/22/19 0750  7.5    08/21/19 1629  7.5    08/20/19 2223  7.5        Significant Interactions: Amiodarone, Statin  Bridge Therapy: No     Reversal Agent Administered: Vitamin K  Intravenous  Comments: Warfarin from home for h/o Afib.  INR now therapeutic, rather quickly, after warfarin resumption.  Appears previous home regimen of 7.5mg daily will increase INR too quickly now.  No s/x of bleeding per chart review. Will give 2.5mg tonight and assess INR in AM.    Plan:  Warfarin 2.5mg. INR in AM.  Education Material Provided?: No(Chronic warfarin patient)  Pharmacist suggested discharge dosing: Warfarin 5mg daily with follow up within 96hrs of discharge.     Fidencio Rios

## 2019-08-24 NOTE — PROGRESS NOTES
Patient alert,oriented x4,pleasant ,assumed care given at the start of the shift,c/o left leg pain  Pain med given,left lower leg still swollen,tender and warm to touch,patient is calling as needed,call light and personal belongings within reach and ed in low position.

## 2019-08-25 NOTE — DISCHARGE INSTRUCTIONS
Discharge Instructions    Discharged to home by taxi with self. Discharged via wheelchair, hospital escort: Yes.  Special equipment needed: Not Applicable    Be sure to schedule a follow-up appointment with your primary care doctor or any specialists as instructed.     Discharge Plan:   Diet Plan: Discussed  Activity Level: Discussed  Smoking Cessation Offered: Patient Refused  Confirmed Follow up Appointment: Patient to Call and Schedule Appointment  Confirmed Symptoms Management: Discussed  Medication Reconciliation Updated: Yes  Influenza Vaccine Indication: Patient Refuses    I understand that a diet low in cholesterol, fat, and sodium is recommended for good health. Unless I have been given specific instructions below for another diet, I accept this instruction as my diet prescription.   Other diet: regular      Special Instructions: {BODY SYSTEMS:15901}    · Is patient discharged on Warfarin / Coumadin?   {WARFARIN YES/NO?:244050}    Depression / Suicide Risk    As you are discharged from this [facility] facility, it is important to learn how to keep safe from harming yourself.    Recognize the warning signs:  · Abrupt changes in personality, positive or negative- including increase in energy   · Giving away possessions  · Change in eating patterns- significant weight changes-  positive or negative  · Change in sleeping patterns- unable to sleep or sleeping all the time   · Unwillingness or inability to communicate  · Depression  · Unusual sadness, discouragement and loneliness  · Talk of wanting to die  · Neglect of personal appearance   · Rebelliousness- reckless behavior  · Withdrawal from people/activities they love  · Confusion- inability to concentrate     If you or a loved one observes any of these behaviors or has concerns about self-harm, here's what you can do:  · Talk about it- your feelings and reasons for harming yourself  · Remove any means that you might use to hurt yourself (examples: pills,  rope, extension cords, firearm)  · Get professional help from the community (Mental Health, Substance Abuse, psychological counseling)  · Do not be alone:Call your Safe Contact- someone whom you trust who will be there for you.  · Call your local CRISIS HOTLINE 125-4289 or 532-809-5717  · Call your local Children's Mobile Crisis Response Team [place] (766) 243-0098 or www.[employer]  · Call the toll free National Suicide Prevention Hotlines   · National Suicide Prevention Lifeline 868-842-XLCW (7052)  · National Hope Line Network 800-SUICIDE (619-7820)

## 2019-08-25 NOTE — DISCHARGE SUMMARY
Internal Medicine Discharge Summary     Date of Admission: 8/14/2019  Date of Discharge: 8/24/2019   Service: Internal Medicine  Attending Physician: Dr. Gemma Bob  Senior Resident: Evelyn Mtz M.D.  Intern: Dr. Milner    Discharge Diagnoses:     Traumatic hematoma of left lower leg POA: Yes    Constipation POA: No    Chronic obstructive pulmonary disease with acute exacerbation (HCC) POA: Yes    CAD (coronary artery disease) POA: Yes    Heart failure with preserved ejection fraction, borderline, class IV (HCC) POA: Yes    Physical deconditioning POA: Clinically Undetermined    Paroxysmal atrial fibrillation (HCC) POA: Yes    Left leg swelling POA: Yes    Procedures and Imaging:   CT-CTA LOWER EXT WITH & W/O-POST PROCESS LEFT   Final Result         1.  Intermediate density fluid tracking along the posterior aspect of the right lower leg posterior compartment, appearance most compatible with hematoma. Given location there is concern for risk for development of compartment syndrome.   2.  Left knee joint effusion.   3.  Segment of nonopacification of the proximal tibioperoneal trunk on the left appears to represent occlusion, there is reconstitution with three-vessel runoff of the left ankle.   4.  Scattered right lower extremity atherosclerotic calcifications with less than 50% stenosis.      These findings were discussed with the patient's clinician, VANESSA CONNOR, on 8/15/2019 3:23 AM.      DX-KNEE 3 VIEWS LEFT   Final Result         1.  No acute traumatic bony injury.   2.  Tricompartmental degenerative changes.   3.  Suprapatellar effusion.   4.  Atherosclerosis           Consultants:   Orthopedic Surgery    History of Present Illness:   Joshua Medrano is a 53 y.o. male with past medical history of hypertension, chronic heart failure, chronic obstructive pulmonary disease, coronary artery disease, and atrial fibrillation who presented to the ED with severe left leg pain.      Patient reported that 6  "days prior to presentation he bumped his left calf. Then he noticed progressive swelling and pain. He said that 3 days prior to presentation, swelling worsened to the point that he could not walk due to pain. Patient denies chest pain, palpitations, fever or chills. In the ED, admitting team was unable to palpate pulses, pain on palpation was present, there were no changes in sensation. He was evaluated by Orthopedic Surgery in the ED, who determined that he did not have compartment syndrome and did not need urgent fasciotomy or evacuation. He was therefore admitted for medical management.    See admission H&P for additional details.     Hospital Course by Problem:   Traumatic hematoma of left lower leg-  Patient was on warfarin with supratherapeutic INR 4.55 on admission. Initial concern for compartment syndrome but not present per orthopedic surgeon, who recommended conservative measures and would consider evacuating hematoma when INR is reversed. He received Vit-K and FFP. INR was subsequently subtherapeutic to 1.3. He was reevaluated by Orthopedic Surgery, who recommended against evacuation. Warfarin was restarted on 8/20, with no subsequent worsening of swelling or pain. He was evaluated by PT and OT, and skilled nursing was recommended but the patient refused. He insisted that he was safe at home and has \"big nephews\" who can help him at home. He was therefore discharged home with home health.    Constipation-  Patient received opioids and refused his daily bowel regimen this admission, which likely precipitated his constipation. He required senokot, miralax, and an enema to have a bowel movement on 8/20 and did not have another until 8/24, when it was a condition of discharge and he agreed to a dulcolax suppository.    Heart failure with preserved ejection fraction, borderline, class IV-  HF is chronic and not acutely decompensated. Stable. Last EF was 55%, pt has mild concentric left ventricular hypertrophy, " enlarged right atrium and severely dilated right ventricle. Extremities with strong pulse and well perfused. Continued lisinopril, furosemide, metoprolol, rosuvastatin, spironolactone.    Coronary artery disease-  History of CABG x2 (LIMA to LAD and SVG to OM), on Statin, lisinopril. Home medications were continued.    Chronic obstructive pulmonary disease with acute exacerbation-  Stable. Hematocrit elevated at 59.2. Home Spiriva, albuterol, and duonebs were continued.    Left leg swelling-  Questionable diagnosis of cellulitis earlier this admission, determined to not be infection. He had a persistent leukocytosis this admission and on exam his leg looked erythematous and tender to touch. He was started on ceftriaxone in the ED, which was discontinued on 8/15/19. That evening one blood culture bottle showed gram positive rods on gram stain. He was initially started on vancomycin, but the result thought to be a contaminant and antibiotics were discontinued.     Paroxysmal atrial fibrillation-  Stable on amiodarone, which was continued.      Physical Exam on Day of Discharge:   Vitals:    08/24/19 0720 08/24/19 1000 08/24/19 1510 08/24/19 1549   BP: 103/67  100/54    Pulse: 68 74 68 73   Resp: 20 18 20 18   Temp: 36.4 °C (97.6 °F)  36.3 °C (97.4 °F)    TempSrc: Temporal  Temporal    SpO2: 95% 96% 100% 96%   Weight:       Height:         Weight/BMI: Body mass index is 38.48 kg/m².  Pulse Oximetry: 96 %, O2 (LPM): 2, O2 Delivery: Silicone Nasal Cannula    Physical Exam   Constitutional: He is oriented to person, place, and time and well-developed, well-nourished, and in no distress. No distress.   HENT:   Head: Normocephalic and atraumatic.   Eyes: Pupils are equal, round, and reactive to light. Conjunctivae and EOM are normal.   Neck: Normal range of motion. Neck supple. No thyromegaly present.   Cardiovascular: Normal rate, regular rhythm and normal heart sounds.   Pulmonary/Chest: Effort normal and breath sounds  normal. No respiratory distress. He has no wheezes. He has no rales.   Abdominal: Soft. Bowel sounds are normal. He exhibits no distension. There is no tenderness. There is no rebound and no guarding.   Musculoskeletal: Normal range of motion. He exhibits edema, tenderness and deformity.   Left lower extremity sewwling of the calf still present. But better. Erythematous skin tenderness. Distal hyperpigmentation of the distal legs.   Neurological: He is alert and oriented to person, place, and time. He has normal reflexes. No cranial nerve deficit. GCS score is 15.   Skin: Skin is warm. Rash noted. He is not diaphoretic. There is erythema.     Most Recent Labs:    Lab Results   Component Value Date/Time    WBC 23.2 (H) 08/24/2019 03:47 AM    RBC 6.91 (H) 08/24/2019 03:47 AM    HEMOGLOBIN 17.5 08/24/2019 03:47 AM    HEMATOCRIT 55.9 (H) 08/24/2019 03:47 AM    MCV 80.9 (L) 08/24/2019 03:47 AM    MCH 25.3 (L) 08/24/2019 03:47 AM    MCHC 31.3 (L) 08/24/2019 03:47 AM    MPV 8.8 (L) 08/24/2019 03:47 AM    NEUTSPOLYS 86.90 (H) 08/24/2019 03:47 AM    LYMPHOCYTES 3.20 (L) 08/24/2019 03:47 AM    MONOCYTES 5.70 08/24/2019 03:47 AM    EOSINOPHILS 1.20 08/24/2019 03:47 AM    EOSINOPHILS 2 11/07/2018 09:34 AM    BASOPHILS 1.10 08/24/2019 03:47 AM    HYPOCHROMIA 1+ 08/15/2019 12:56 AM    ANISOCYTOSIS 1+ 08/24/2019 03:47 AM      Lab Results   Component Value Date/Time    SODIUM 126 (L) 08/23/2019 08:03 AM    POTASSIUM 4.6 08/23/2019 08:03 AM    CHLORIDE 93 (L) 08/23/2019 08:03 AM    CO2 25 08/23/2019 08:03 AM    GLUCOSE 149 (H) 08/23/2019 08:03 AM    BUN 19 08/23/2019 08:03 AM    CREATININE 0.75 08/23/2019 08:03 AM      Lab Results   Component Value Date/Time    ALTSGPT 15 08/17/2019 07:22 AM    ASTSGOT 21 08/17/2019 07:22 AM    ALKPHOSPHAT 132 (H) 08/17/2019 07:22 AM    TBILIRUBIN 0.9 08/17/2019 07:22 AM    LIPASE 30 11/03/2018 07:14 PM    ALBUMIN 3.2 08/17/2019 07:22 AM    GLOBULIN 4.2 (H) 08/17/2019 07:22 AM    PREALBUMIN 10.0 (L)  01/29/2018 03:58 AM    INR 2.41 (H) 08/24/2019 03:47 AM    MACROCYTOSIS 1+ 08/18/2019 02:55 AM     Lab Results   Component Value Date/Time    PROTHROMBTM 27.1 (H) 08/24/2019 03:47 AM    INR 2.41 (H) 08/24/2019 03:47 AM        Condition at Discharge:   stable     Disposition:    To home with home health    Discharge Medications:      Medication List      START taking these medications      Instructions   oxyCODONE immediate-release 5 MG Tabs  Commonly known as:  ROXICODONE   Take 1-2 Tabs by mouth every four hours as needed for up to 7 days.  Dose:  5-10 mg        CONTINUE taking these medications      Instructions   acetaminophen 500 MG Tabs  Commonly known as:  TYLENOL   Take 1,000 mg by mouth every 8 hours as needed for Moderate Pain.  Dose:  1,000 mg     albuterol 108 (90 Base) MCG/ACT Aers inhalation aerosol   Inhale 2 Puffs by mouth every 6 hours as needed for Shortness of Breath.  Dose:  2 Puff     amiodarone 200 MG Tabs  Commonly known as:  CORDARONE   Take 200 mg by mouth every morning.  Dose:  200 mg     ferrous sulfate 325 (65 Fe) MG tablet   Take 325 mg by mouth every morning.  Dose:  325 mg     furosemide 40 MG Tabs  Commonly known as:  LASIX   Take 40 mg by mouth every morning.  Dose:  40 mg     ipratropium-albuterol 0.5-2.5 (3) MG/3ML nebulizer solution  Commonly known as:  DUONEB   3 mL by Nebulization route every 6 hours as needed for Shortness of Breath.  Dose:  3 mL     lisinopril 2.5 MG Tabs  Commonly known as:  PRINIVIL   Take 2.5 mg by mouth every morning.  Dose:  2.5 mg     metoprolol SR 25 MG Tb24  Commonly known as:  TOPROL XL   Take 25 mg by mouth every morning.  Dose:  25 mg     polyethylene glycol/lytes Pack  Commonly known as:  MIRALAX   Take 17 g by mouth every morning.  Dose:  17 g     potassium chloride SA 10 MEQ Tbcr  Commonly known as:  K-DUR   Take 10 mEq by mouth every morning.  Dose:  10 mEq     rosuvastatin 40 MG tablet  Commonly known as:  CRESTOR   Take 40 mg by mouth every  morning.  Dose:  40 mg     spironolactone 25 MG Tabs  Commonly known as:  ALDACTONE   Take 25 mg by mouth every morning.  Dose:  25 mg     tiotropium 18 MCG Caps  Commonly known as:  SPIRIVA   Inhale 18 mcg by mouth every morning.  Dose:  18 mcg     warfarin 5 MG Tabs  Commonly known as:  COUMADIN   Take 7.5 mg by mouth every morning.  Dose:  7.5 mg            Instructions:   The patient was instructed to return to the ER in the event of worsening symptoms. I have counseled the patient on the importance of compliance and the patient has agreed to proceed with all medical recommendations and follow up plan indicated above.   The patient understands that all medications come with benefits and risks. Risks may include permanent injury or death and these risks can be minimized with close reassessment and monitoring.        Follow-up:   Nam Le M.D.  [address] [place] [id number]  [phone]    In 1 week  For follow up    Follow up with PCP for above issues as noted. Recommend outpatient evaluation of his chronic leukocytosis.    Time Spent on Discharge: 55 min

## 2019-08-28 ENCOUNTER — ANTICOAGULATION MONITORING (OUTPATIENT)
Dept: VASCULAR LAB | Facility: MEDICAL CENTER | Age: 54
End: 2019-08-28

## 2019-08-28 DIAGNOSIS — I48.0 PAROXYSMAL ATRIAL FIBRILLATION (HCC): ICD-10-CM

## 2019-08-28 NOTE — PROGRESS NOTES
Talked to pt on the phone. He hasn't had an INR done since leaving the hospital on 8/24/19. Strongly encouraged patient to take INR today with home monitor.     Patient is having home health set up and expects it sometime this week.     Pt said will try to take INR today.     Will check back with pt once INR is done.     Ambrosio Rothman, Pharmacy Intern

## 2019-08-30 LAB — INR PPP: 5.3 (ref 2–3.5)

## 2019-08-30 NOTE — PROGRESS NOTES
08/30/19 - 106p    Spoke with wife.   Will try to do INR today.  Pt has been having diarrhea.  Explained that the concern is that the INR may have increased further post hospital discharge, and now especially that pt has been having diarrhea which normally increases INR.     Wandy Garzon, PharmD

## 2019-09-02 NOTE — PROGRESS NOTES
Anticoagulation Summary  As of 3/12/2018    INR goal:   2.0-3.0   TTR:   69.0 % (3 wk)   Today's INR:   3.3!   Maintenance plan:   10 mg (5 mg x 2) every day   Weekly total:   70 mg   Plan last modified:   Johnathan Chen, PharmD (2/16/2018)   Next INR check:   3/19/2018   Target end date:   Indefinite    Indications    Atrial fibrillation  controlled (CMS-HCC) [I48.91]  Atrial flutter with rapid ventricular response (CMS-HCC) (Resolved) [I48.92]             Anticoagulation Episode Summary     INR check location:       Preferred lab:       Send INR reminders to:       Comments:   [facility]       Anticoagulation Care Providers     Provider Role Specialty Phone number    Jett Bedoya M.D. Referring Cardiology [phone]    [facility] Anticoagulation Services Responsible  [phone]        Anticoagulation Patient Findings    Spoke to patient's wife on phone. Instructed her to have the patient HOLD warfarin x 1 dose then resume 10 mg daily. Follow up INR in one week via [facility].    Devin Santana, PharmD     negative...

## 2019-09-03 ENCOUNTER — ANTICOAGULATION MONITORING (OUTPATIENT)
Dept: VASCULAR LAB | Facility: MEDICAL CENTER | Age: 54
End: 2019-09-03

## 2019-09-03 DIAGNOSIS — I48.0 PAROXYSMAL ATRIAL FIBRILLATION (HCC): ICD-10-CM

## 2019-09-03 LAB — INR PPP: 4 (ref 2–3.5)

## 2019-09-03 NOTE — PROGRESS NOTES
Anticoagulation Summary  As of 9/3/2019    INR goal:   2.0-3.0   TTR:   41.6 % (1 y)   INR used for dosin.30! (2019)   Warfarin maintenance plan:   7.5 mg (5 mg x 1.5) every day   Weekly warfarin total:   52.5 mg   Plan last modified:   Rachele San (2019)   Next INR check:   9/3/2019   Target end date:   Indefinite    Indications    Paroxysmal atrial fibrillation (HCC) [I48.0]  Atrial flutter with rapid ventricular response (HCC) (Resolved) [I48.92]             Anticoagulation Episode Summary     INR check location:       Preferred lab:       Send INR reminders to:       Comments:   [facility]      Anticoagulation Care Providers     Provider Role Specialty Phone number    Jett Bedoya M.D. Referring Cardiology [phone]    [facility] Anticoagulation Services Responsible  [phone]        Anticoagulation Patient Findings        spoke with Janie to report a supra therapeutic INR of 5.3.  Pt self held for 3 days, which was appropriate.  Pt instructed to test INR today.  Briana Roberson, Clinical Pharmacist, CDE, CACP

## 2019-09-03 NOTE — PROGRESS NOTES
Anticoagulation Summary  As of 9/3/2019    INR goal:   2.0-3.0   TTR:   41.2 % (1.1 y)   INR used for dosin.00! (9/3/2019)   Warfarin maintenance plan:   7.5 mg (5 mg x 1.5) every day   Weekly warfarin total:   52.5 mg   Plan last modified:   Briana Roberson (9/3/2019)   Next INR check:   2019   Target end date:   Indefinite    Indications    Paroxysmal atrial fibrillation (HCC) [I48.0]  Atrial flutter with rapid ventricular response (HCC) (Resolved) [I48.92]             Anticoagulation Episode Summary     INR check location:       Preferred lab:       Send INR reminders to:       Comments:   [facility] K[facility]      Anticoagulation Care Providers     Provider Role Specialty Phone number    Jett Bedoya M.D. Referring Cardiology [phone]    [facility] Anticoagulation Services Responsible  [phone]        Anticoagulation Patient Findings        Spoke with Joshua to report a supra therapeutic INR of 4.0.  Will HOLD dose tonight, then resume current dosing regimen. Follow up in 3 days, to reduce the risk of adverse events related to this high risk medication, warfarin.    Briana Roberson, Clinical Pharmacist

## 2019-09-10 ENCOUNTER — APPOINTMENT (OUTPATIENT)
Dept: RADIOLOGY | Facility: MEDICAL CENTER | Age: 54
DRG: 439 | End: 2019-09-10
Attending: EMERGENCY MEDICINE
Payer: MEDICARE

## 2019-09-10 ENCOUNTER — HOSPITAL ENCOUNTER (INPATIENT)
Facility: MEDICAL CENTER | Age: 54
LOS: 2 days | DRG: 439 | End: 2019-09-13
Attending: EMERGENCY MEDICINE | Admitting: HOSPITALIST
Payer: MEDICARE

## 2019-09-10 DIAGNOSIS — I48.0 PAROXYSMAL ATRIAL FIBRILLATION (HCC): ICD-10-CM

## 2019-09-10 DIAGNOSIS — A41.9 SEPSIS, DUE TO UNSPECIFIED ORGANISM: ICD-10-CM

## 2019-09-10 DIAGNOSIS — K86.9 PANCREATIC LESION: ICD-10-CM

## 2019-09-10 DIAGNOSIS — K85.90 ACUTE PANCREATITIS, UNSPECIFIED COMPLICATION STATUS, UNSPECIFIED PANCREATITIS TYPE: ICD-10-CM

## 2019-09-10 DIAGNOSIS — R79.1 SUPRATHERAPEUTIC INR: ICD-10-CM

## 2019-09-10 LAB
ALBUMIN SERPL BCP-MCNC: 3.5 G/DL (ref 3.2–4.9)
ALBUMIN/GLOB SERPL: 0.7 G/DL
ALP SERPL-CCNC: 164 U/L (ref 30–99)
ALT SERPL-CCNC: 9 U/L (ref 2–50)
AMORPH CRY #/AREA URNS HPF: PRESENT /HPF
ANION GAP SERPL CALC-SCNC: 12 MMOL/L (ref 0–11.9)
ANISOCYTOSIS BLD QL SMEAR: ABNORMAL
APPEARANCE UR: ABNORMAL
APTT PPP: 83.1 SEC (ref 24.7–36)
AST SERPL-CCNC: 12 U/L (ref 12–45)
BACTERIA #/AREA URNS HPF: ABNORMAL /HPF
BASOPHILS # BLD AUTO: 0.9 % (ref 0–1.8)
BASOPHILS # BLD: 0.22 K/UL (ref 0–0.12)
BILIRUB SERPL-MCNC: 0.8 MG/DL (ref 0.1–1.5)
BILIRUB UR QL STRIP.AUTO: NEGATIVE
BUN SERPL-MCNC: 19 MG/DL (ref 8–22)
CALCIUM SERPL-MCNC: 9.5 MG/DL (ref 8.5–10.5)
CHLORIDE SERPL-SCNC: 96 MMOL/L (ref 96–112)
CO2 SERPL-SCNC: 26 MMOL/L (ref 20–33)
COLOR UR: ABNORMAL
COMMENT 1642: NORMAL
CREAT SERPL-MCNC: 1.25 MG/DL (ref 0.5–1.4)
EKG IMPRESSION: NORMAL
EOSINOPHIL # BLD AUTO: 0.34 K/UL (ref 0–0.51)
EOSINOPHIL NFR BLD: 1.4 % (ref 0–6.9)
EPI CELLS #/AREA URNS HPF: ABNORMAL /HPF
ERYTHROCYTE [DISTWIDTH] IN BLOOD BY AUTOMATED COUNT: 79.7 FL (ref 35.9–50)
GLOBULIN SER CALC-MCNC: 4.9 G/DL (ref 1.9–3.5)
GLUCOSE SERPL-MCNC: 139 MG/DL (ref 65–99)
GLUCOSE UR STRIP.AUTO-MCNC: NEGATIVE MG/DL
HCT VFR BLD AUTO: 59.9 % (ref 42–52)
HGB BLD-MCNC: 18.2 G/DL (ref 14–18)
HYPOCHROMIA BLD QL SMEAR: ABNORMAL
IMM GRANULOCYTES # BLD AUTO: 0.33 K/UL (ref 0–0.11)
IMM GRANULOCYTES NFR BLD AUTO: 1.4 % (ref 0–0.9)
INR PPP: 7.07 (ref 0.87–1.13)
KETONES UR STRIP.AUTO-MCNC: ABNORMAL MG/DL
LACTATE BLD-SCNC: 1.8 MMOL/L (ref 0.5–2)
LEUKOCYTE ESTERASE UR QL STRIP.AUTO: ABNORMAL
LIPASE SERPL-CCNC: 586 U/L (ref 11–82)
LYMPHOCYTES # BLD AUTO: 0.76 K/UL (ref 1–4.8)
LYMPHOCYTES NFR BLD: 3.1 % (ref 22–41)
MCH RBC QN AUTO: 25 PG (ref 27–33)
MCHC RBC AUTO-ENTMCNC: 30.4 G/DL (ref 33.7–35.3)
MCV RBC AUTO: 82.2 FL (ref 81.4–97.8)
MICRO URNS: ABNORMAL
MICROCYTES BLD QL SMEAR: ABNORMAL
MONOCYTES # BLD AUTO: 1.29 K/UL (ref 0–0.85)
MONOCYTES NFR BLD AUTO: 5.3 % (ref 0–13.4)
MORPHOLOGY BLD-IMP: NORMAL
NEUTROPHILS # BLD AUTO: 21.45 K/UL (ref 1.82–7.42)
NEUTROPHILS NFR BLD: 87.9 % (ref 44–72)
NITRITE UR QL STRIP.AUTO: POSITIVE
NRBC # BLD AUTO: 0 K/UL
NRBC BLD-RTO: 0 /100 WBC
PH UR STRIP.AUTO: 5 [PH] (ref 5–8)
PLATELET # BLD AUTO: 371 K/UL (ref 164–446)
PLATELET BLD QL SMEAR: NORMAL
PMV BLD AUTO: 9.7 FL (ref 9–12.9)
POTASSIUM SERPL-SCNC: 4.5 MMOL/L (ref 3.6–5.5)
PROCALCITONIN SERPL-MCNC: 0.31 NG/ML
PROT SERPL-MCNC: 8.4 G/DL (ref 6–8.2)
PROT UR QL STRIP: 100 MG/DL
PROTHROMBIN TIME: 64 SEC (ref 12–14.6)
RBC # BLD AUTO: 7.29 M/UL (ref 4.7–6.1)
RBC # URNS HPF: >150 /HPF
RBC BLD AUTO: PRESENT
RBC UR QL AUTO: ABNORMAL
SODIUM SERPL-SCNC: 134 MMOL/L (ref 135–145)
SP GR UR STRIP.AUTO: 1.02
TROPONIN T SERPL-MCNC: 37 NG/L (ref 6–19)
UROBILINOGEN UR STRIP.AUTO-MCNC: 1 MG/DL
WBC # BLD AUTO: 24.4 K/UL (ref 4.8–10.8)
WBC #/AREA URNS HPF: ABNORMAL /HPF

## 2019-09-10 PROCEDURE — 700105 HCHG RX REV CODE 258: Performed by: EMERGENCY MEDICINE

## 2019-09-10 PROCEDURE — 80053 COMPREHEN METABOLIC PANEL: CPT

## 2019-09-10 PROCEDURE — 83690 ASSAY OF LIPASE: CPT

## 2019-09-10 PROCEDURE — 83605 ASSAY OF LACTIC ACID: CPT

## 2019-09-10 PROCEDURE — 93005 ELECTROCARDIOGRAM TRACING: CPT

## 2019-09-10 PROCEDURE — 81001 URINALYSIS AUTO W/SCOPE: CPT

## 2019-09-10 PROCEDURE — 93005 ELECTROCARDIOGRAM TRACING: CPT | Performed by: EMERGENCY MEDICINE

## 2019-09-10 PROCEDURE — 700111 HCHG RX REV CODE 636 W/ 250 OVERRIDE (IP)

## 2019-09-10 PROCEDURE — 85610 PROTHROMBIN TIME: CPT

## 2019-09-10 PROCEDURE — 96368 THER/DIAG CONCURRENT INF: CPT

## 2019-09-10 PROCEDURE — 84145 PROCALCITONIN (PCT): CPT

## 2019-09-10 PROCEDURE — 74177 CT ABD & PELVIS W/CONTRAST: CPT

## 2019-09-10 PROCEDURE — 36415 COLL VENOUS BLD VENIPUNCTURE: CPT

## 2019-09-10 PROCEDURE — 71045 X-RAY EXAM CHEST 1 VIEW: CPT

## 2019-09-10 PROCEDURE — 700117 HCHG RX CONTRAST REV CODE 255: Performed by: EMERGENCY MEDICINE

## 2019-09-10 PROCEDURE — 85025 COMPLETE CBC W/AUTO DIFF WBC: CPT

## 2019-09-10 PROCEDURE — 700111 HCHG RX REV CODE 636 W/ 250 OVERRIDE (IP): Performed by: EMERGENCY MEDICINE

## 2019-09-10 PROCEDURE — 85730 THROMBOPLASTIN TIME PARTIAL: CPT

## 2019-09-10 PROCEDURE — 96365 THER/PROPH/DIAG IV INF INIT: CPT

## 2019-09-10 PROCEDURE — 84484 ASSAY OF TROPONIN QUANT: CPT

## 2019-09-10 PROCEDURE — 99285 EMERGENCY DEPT VISIT HI MDM: CPT

## 2019-09-10 PROCEDURE — 700101 HCHG RX REV CODE 250: Performed by: EMERGENCY MEDICINE

## 2019-09-10 PROCEDURE — 96375 TX/PRO/DX INJ NEW DRUG ADDON: CPT

## 2019-09-10 PROCEDURE — 87040 BLOOD CULTURE FOR BACTERIA: CPT | Mod: 91

## 2019-09-10 RX ORDER — ONDANSETRON 2 MG/ML
4 INJECTION INTRAMUSCULAR; INTRAVENOUS ONCE
Status: COMPLETED | OUTPATIENT
Start: 2019-09-10 | End: 2019-09-10

## 2019-09-10 RX ORDER — HYDROMORPHONE HYDROCHLORIDE 1 MG/ML
0.5 INJECTION, SOLUTION INTRAMUSCULAR; INTRAVENOUS; SUBCUTANEOUS ONCE
Status: COMPLETED | OUTPATIENT
Start: 2019-09-10 | End: 2019-09-10

## 2019-09-10 RX ADMIN — METRONIDAZOLE 500 MG: 500 INJECTION, SOLUTION INTRAVENOUS at 21:34

## 2019-09-10 RX ADMIN — HYDROMORPHONE HYDROCHLORIDE 0.5 MG: 1 INJECTION, SOLUTION INTRAMUSCULAR; INTRAVENOUS; SUBCUTANEOUS at 21:22

## 2019-09-10 RX ADMIN — ONDANSETRON 4 MG: 2 INJECTION INTRAMUSCULAR; INTRAVENOUS at 21:22

## 2019-09-10 RX ADMIN — IOHEXOL 100 ML: 350 INJECTION, SOLUTION INTRAVENOUS at 23:54

## 2019-09-10 RX ADMIN — CEFTRIAXONE SODIUM 2 G: 2 INJECTION, POWDER, FOR SOLUTION INTRAMUSCULAR; INTRAVENOUS at 21:34

## 2019-09-11 ENCOUNTER — APPOINTMENT (OUTPATIENT)
Dept: RADIOLOGY | Facility: MEDICAL CENTER | Age: 54
DRG: 439 | End: 2019-09-11
Attending: STUDENT IN AN ORGANIZED HEALTH CARE EDUCATION/TRAINING PROGRAM
Payer: MEDICARE

## 2019-09-11 ENCOUNTER — APPOINTMENT (OUTPATIENT)
Dept: RADIOLOGY | Facility: MEDICAL CENTER | Age: 54
DRG: 439 | End: 2019-09-11
Attending: HOSPITALIST
Payer: MEDICARE

## 2019-09-11 PROBLEM — R79.1 SUPRATHERAPEUTIC INR: Status: ACTIVE | Noted: 2019-09-11

## 2019-09-11 PROBLEM — R06.02 SHORTNESS OF BREATH: Status: ACTIVE | Noted: 2019-09-11

## 2019-09-11 PROBLEM — K86.2 PANCREATIC CYST: Status: ACTIVE | Noted: 2019-09-11

## 2019-09-11 PROBLEM — R31.9 HEMATURIA: Status: ACTIVE | Noted: 2019-09-11

## 2019-09-11 PROBLEM — K86.9 PANCREATIC LESION: Status: ACTIVE | Noted: 2019-09-11

## 2019-09-11 PROBLEM — K46.9 ABDOMINAL HERNIA: Status: ACTIVE | Noted: 2019-09-11

## 2019-09-11 PROBLEM — R10.9 ABDOMINAL PAIN: Status: ACTIVE | Noted: 2019-09-11

## 2019-09-11 LAB
ABO GROUP BLD: NORMAL
ALBUMIN SERPL BCP-MCNC: 3.1 G/DL (ref 3.2–4.9)
ALBUMIN/GLOB SERPL: 0.7 G/DL
ALP SERPL-CCNC: 142 U/L (ref 30–99)
ALT SERPL-CCNC: 8 U/L (ref 2–50)
ANION GAP SERPL CALC-SCNC: 11 MMOL/L (ref 0–11.9)
AST SERPL-CCNC: 10 U/L (ref 12–45)
BARCODED ABORH UBTYP: 6200
BARCODED PRD CODE UBPRD: NORMAL
BARCODED UNIT NUM UBUNT: NORMAL
BASOPHILS # BLD AUTO: 0.8 % (ref 0–1.8)
BASOPHILS # BLD: 0.15 K/UL (ref 0–0.12)
BILIRUB SERPL-MCNC: 0.6 MG/DL (ref 0.1–1.5)
BLD GP AB SCN SERPL QL: NORMAL
BUN SERPL-MCNC: 20 MG/DL (ref 8–22)
CALCIUM SERPL-MCNC: 8.7 MG/DL (ref 8.5–10.5)
CHLORIDE SERPL-SCNC: 99 MMOL/L (ref 96–112)
CO2 SERPL-SCNC: 24 MMOL/L (ref 20–33)
COMPONENT F 8504F: NORMAL
CREAT SERPL-MCNC: 1.2 MG/DL (ref 0.5–1.4)
EOSINOPHIL # BLD AUTO: 0.35 K/UL (ref 0–0.51)
EOSINOPHIL NFR BLD: 1.8 % (ref 0–6.9)
ERYTHROCYTE [DISTWIDTH] IN BLOOD BY AUTOMATED COUNT: 77.7 FL (ref 35.9–50)
GLOBULIN SER CALC-MCNC: 4.2 G/DL (ref 1.9–3.5)
GLUCOSE SERPL-MCNC: 128 MG/DL (ref 65–99)
HCT VFR BLD AUTO: 55.8 % (ref 42–52)
HCT VFR BLD AUTO: 56.2 % (ref 42–52)
HGB BLD-MCNC: 16.8 G/DL (ref 14–18)
HGB BLD-MCNC: 17.5 G/DL (ref 14–18)
IMM GRANULOCYTES # BLD AUTO: 0.3 K/UL (ref 0–0.11)
IMM GRANULOCYTES NFR BLD AUTO: 1.5 % (ref 0–0.9)
INR PPP: 8.53 (ref 0.87–1.13)
INR PPP: 9.3 (ref 0.87–1.13)
LACTATE BLD-SCNC: 1.3 MMOL/L (ref 0.5–2)
LYMPHOCYTES # BLD AUTO: 0.62 K/UL (ref 1–4.8)
LYMPHOCYTES NFR BLD: 3.2 % (ref 22–41)
MCH RBC QN AUTO: 24.7 PG (ref 27–33)
MCHC RBC AUTO-ENTMCNC: 30.1 G/DL (ref 33.7–35.3)
MCV RBC AUTO: 82.2 FL (ref 81.4–97.8)
MONOCYTES # BLD AUTO: 1.1 K/UL (ref 0–0.85)
MONOCYTES NFR BLD AUTO: 5.6 % (ref 0–13.4)
NEUTROPHILS # BLD AUTO: 17.09 K/UL (ref 1.82–7.42)
NEUTROPHILS NFR BLD: 87.1 % (ref 44–72)
NRBC # BLD AUTO: 0 K/UL
NRBC BLD-RTO: 0 /100 WBC
PLATELET # BLD AUTO: 299 K/UL (ref 164–446)
PMV BLD AUTO: 9.2 FL (ref 9–12.9)
POTASSIUM SERPL-SCNC: 3.7 MMOL/L (ref 3.6–5.5)
PRODUCT TYPE UPROD: NORMAL
PROT SERPL-MCNC: 7.3 G/DL (ref 6–8.2)
PROTHROMBIN TIME: 74.5 SEC (ref 12–14.6)
PROTHROMBIN TIME: 79.7 SEC (ref 12–14.6)
RBC # BLD AUTO: 6.79 M/UL (ref 4.7–6.1)
RH BLD: NORMAL
SODIUM SERPL-SCNC: 134 MMOL/L (ref 135–145)
TROPONIN T SERPL-MCNC: 40 NG/L (ref 6–19)
UNIT STATUS USTAT: NORMAL
WBC # BLD AUTO: 19.6 K/UL (ref 4.8–10.8)

## 2019-09-11 PROCEDURE — 700111 HCHG RX REV CODE 636 W/ 250 OVERRIDE (IP): Performed by: STUDENT IN AN ORGANIZED HEALTH CARE EDUCATION/TRAINING PROGRAM

## 2019-09-11 PROCEDURE — 94760 N-INVAS EAR/PLS OXIMETRY 1: CPT

## 2019-09-11 PROCEDURE — 85025 COMPLETE CBC W/AUTO DIFF WBC: CPT

## 2019-09-11 PROCEDURE — 83605 ASSAY OF LACTIC ACID: CPT

## 2019-09-11 PROCEDURE — 86900 BLOOD TYPING SEROLOGIC ABO: CPT

## 2019-09-11 PROCEDURE — 700102 HCHG RX REV CODE 250 W/ 637 OVERRIDE(OP): Performed by: STUDENT IN AN ORGANIZED HEALTH CARE EDUCATION/TRAINING PROGRAM

## 2019-09-11 PROCEDURE — 99223 1ST HOSP IP/OBS HIGH 75: CPT | Mod: AI,GC | Performed by: HOSPITALIST

## 2019-09-11 PROCEDURE — 84484 ASSAY OF TROPONIN QUANT: CPT

## 2019-09-11 PROCEDURE — 86850 RBC ANTIBODY SCREEN: CPT

## 2019-09-11 PROCEDURE — 700101 HCHG RX REV CODE 250: Performed by: HOSPITALIST

## 2019-09-11 PROCEDURE — 86901 BLOOD TYPING SEROLOGIC RH(D): CPT

## 2019-09-11 PROCEDURE — 700105 HCHG RX REV CODE 258: Performed by: EMERGENCY MEDICINE

## 2019-09-11 PROCEDURE — P9017 PLASMA 1 DONOR FRZ W/IN 8 HR: HCPCS

## 2019-09-11 PROCEDURE — 770020 HCHG ROOM/CARE - TELE (206)

## 2019-09-11 PROCEDURE — 36415 COLL VENOUS BLD VENIPUNCTURE: CPT

## 2019-09-11 PROCEDURE — 94640 AIRWAY INHALATION TREATMENT: CPT

## 2019-09-11 PROCEDURE — 85018 HEMOGLOBIN: CPT

## 2019-09-11 PROCEDURE — 85610 PROTHROMBIN TIME: CPT

## 2019-09-11 PROCEDURE — A9270 NON-COVERED ITEM OR SERVICE: HCPCS | Performed by: STUDENT IN AN ORGANIZED HEALTH CARE EDUCATION/TRAINING PROGRAM

## 2019-09-11 PROCEDURE — 36430 TRANSFUSION BLD/BLD COMPNT: CPT

## 2019-09-11 PROCEDURE — 80053 COMPREHEN METABOLIC PANEL: CPT

## 2019-09-11 PROCEDURE — 700105 HCHG RX REV CODE 258

## 2019-09-11 PROCEDURE — 85014 HEMATOCRIT: CPT

## 2019-09-11 RX ORDER — LORAZEPAM 2 MG/ML
2 INJECTION INTRAMUSCULAR ONCE
Status: DISCONTINUED | OUTPATIENT
Start: 2019-09-11 | End: 2019-09-11

## 2019-09-11 RX ORDER — SPIRONOLACTONE 25 MG/1
25 TABLET ORAL EVERY MORNING
Status: DISCONTINUED | OUTPATIENT
Start: 2019-09-11 | End: 2019-09-13 | Stop reason: HOSPADM

## 2019-09-11 RX ORDER — PHYTONADIONE 5 MG/1
5 TABLET ORAL ONCE
Status: COMPLETED | OUTPATIENT
Start: 2019-09-11 | End: 2019-09-11

## 2019-09-11 RX ORDER — PHYTONADIONE 5 MG/1
5 TABLET ORAL ONCE
Status: DISCONTINUED | OUTPATIENT
Start: 2019-09-11 | End: 2019-09-11

## 2019-09-11 RX ORDER — SODIUM CHLORIDE 9 MG/ML
500 INJECTION, SOLUTION INTRAVENOUS ONCE
Status: COMPLETED | OUTPATIENT
Start: 2019-09-11 | End: 2019-09-11

## 2019-09-11 RX ORDER — MORPHINE SULFATE 4 MG/ML
2 INJECTION, SOLUTION INTRAMUSCULAR; INTRAVENOUS EVERY 4 HOURS PRN
Status: DISCONTINUED | OUTPATIENT
Start: 2019-09-11 | End: 2019-09-11

## 2019-09-11 RX ORDER — ALBUTEROL SULFATE 90 UG/1
2 AEROSOL, METERED RESPIRATORY (INHALATION) EVERY 6 HOURS PRN
Status: DISCONTINUED | OUTPATIENT
Start: 2019-09-11 | End: 2019-09-13 | Stop reason: HOSPADM

## 2019-09-11 RX ORDER — IPRATROPIUM BROMIDE AND ALBUTEROL SULFATE 2.5; .5 MG/3ML; MG/3ML
3 SOLUTION RESPIRATORY (INHALATION) EVERY 6 HOURS PRN
Status: DISCONTINUED | OUTPATIENT
Start: 2019-09-11 | End: 2019-09-13 | Stop reason: HOSPADM

## 2019-09-11 RX ORDER — HYDROMORPHONE HYDROCHLORIDE 1 MG/ML
1 INJECTION, SOLUTION INTRAMUSCULAR; INTRAVENOUS; SUBCUTANEOUS EVERY 4 HOURS PRN
Status: DISCONTINUED | OUTPATIENT
Start: 2019-09-11 | End: 2019-09-11

## 2019-09-11 RX ORDER — TIOTROPIUM BROMIDE 18 UG/1
1 CAPSULE ORAL; RESPIRATORY (INHALATION) DAILY
Status: DISCONTINUED | OUTPATIENT
Start: 2019-09-12 | End: 2019-09-13 | Stop reason: HOSPADM

## 2019-09-11 RX ORDER — ROSUVASTATIN CALCIUM 20 MG/1
40 TABLET, COATED ORAL EVERY MORNING
Status: DISCONTINUED | OUTPATIENT
Start: 2019-09-11 | End: 2019-09-13 | Stop reason: HOSPADM

## 2019-09-11 RX ORDER — ACETAMINOPHEN 325 MG/1
650 TABLET ORAL EVERY 6 HOURS PRN
Status: DISCONTINUED | OUTPATIENT
Start: 2019-09-11 | End: 2019-09-13 | Stop reason: HOSPADM

## 2019-09-11 RX ORDER — TIOTROPIUM BROMIDE 18 UG/1
1 CAPSULE ORAL; RESPIRATORY (INHALATION) EVERY MORNING
Status: DISCONTINUED | OUTPATIENT
Start: 2019-09-11 | End: 2019-09-11

## 2019-09-11 RX ORDER — PHYTONADIONE 5 MG/1
2.5 TABLET ORAL ONCE
Status: COMPLETED | OUTPATIENT
Start: 2019-09-11 | End: 2019-09-11

## 2019-09-11 RX ORDER — HYDROMORPHONE HYDROCHLORIDE 1 MG/ML
0.5 INJECTION, SOLUTION INTRAMUSCULAR; INTRAVENOUS; SUBCUTANEOUS
Status: DISCONTINUED | OUTPATIENT
Start: 2019-09-11 | End: 2019-09-12

## 2019-09-11 RX ORDER — ONDANSETRON 2 MG/ML
4 INJECTION INTRAMUSCULAR; INTRAVENOUS EVERY 4 HOURS PRN
Status: DISCONTINUED | OUTPATIENT
Start: 2019-09-11 | End: 2019-09-13 | Stop reason: HOSPADM

## 2019-09-11 RX ORDER — IPRATROPIUM BROMIDE AND ALBUTEROL SULFATE 2.5; .5 MG/3ML; MG/3ML
3 SOLUTION RESPIRATORY (INHALATION) 4 TIMES DAILY
Status: DISCONTINUED | OUTPATIENT
Start: 2019-09-11 | End: 2019-09-13 | Stop reason: HOSPADM

## 2019-09-11 RX ORDER — AMOXICILLIN 250 MG
2 CAPSULE ORAL 2 TIMES DAILY
Status: DISCONTINUED | OUTPATIENT
Start: 2019-09-11 | End: 2019-09-12

## 2019-09-11 RX ORDER — LORAZEPAM 2 MG/ML
1 INJECTION INTRAMUSCULAR ONCE
Status: COMPLETED | OUTPATIENT
Start: 2019-09-11 | End: 2019-09-11

## 2019-09-11 RX ORDER — SODIUM CHLORIDE 9 MG/ML
INJECTION, SOLUTION INTRAVENOUS
Status: COMPLETED
Start: 2019-09-11 | End: 2019-09-11

## 2019-09-11 RX ORDER — LISINOPRIL 2.5 MG/1
2.5 TABLET ORAL EVERY MORNING
Status: DISCONTINUED | OUTPATIENT
Start: 2019-09-11 | End: 2019-09-11

## 2019-09-11 RX ORDER — METOPROLOL SUCCINATE 25 MG/1
25 TABLET, EXTENDED RELEASE ORAL EVERY MORNING
Status: DISCONTINUED | OUTPATIENT
Start: 2019-09-11 | End: 2019-09-13 | Stop reason: HOSPADM

## 2019-09-11 RX ORDER — AMIODARONE HYDROCHLORIDE 200 MG/1
200 TABLET ORAL EVERY MORNING
Status: DISCONTINUED | OUTPATIENT
Start: 2019-09-11 | End: 2019-09-13 | Stop reason: HOSPADM

## 2019-09-11 RX ORDER — POLYETHYLENE GLYCOL 3350 17 G/17G
1 POWDER, FOR SOLUTION ORAL
Status: DISCONTINUED | OUTPATIENT
Start: 2019-09-11 | End: 2019-09-12

## 2019-09-11 RX ORDER — FUROSEMIDE 40 MG/1
40 TABLET ORAL EVERY MORNING
Status: DISCONTINUED | OUTPATIENT
Start: 2019-09-11 | End: 2019-09-12

## 2019-09-11 RX ORDER — BISACODYL 10 MG
10 SUPPOSITORY, RECTAL RECTAL
Status: DISCONTINUED | OUTPATIENT
Start: 2019-09-11 | End: 2019-09-12

## 2019-09-11 RX ADMIN — PHYTONADIONE 5 MG: 5 TABLET ORAL at 08:52

## 2019-09-11 RX ADMIN — SODIUM CHLORIDE 500 ML: 9 INJECTION, SOLUTION INTRAVENOUS at 01:00

## 2019-09-11 RX ADMIN — HYDROMORPHONE HYDROCHLORIDE 0.5 MG: 1 INJECTION, SOLUTION INTRAMUSCULAR; INTRAVENOUS; SUBCUTANEOUS at 15:42

## 2019-09-11 RX ADMIN — HYDROMORPHONE HYDROCHLORIDE 0.5 MG: 1 INJECTION, SOLUTION INTRAMUSCULAR; INTRAVENOUS; SUBCUTANEOUS at 20:08

## 2019-09-11 RX ADMIN — SODIUM CHLORIDE 250 ML: 9 INJECTION, SOLUTION INTRAVENOUS at 18:54

## 2019-09-11 RX ADMIN — HYDROMORPHONE HYDROCHLORIDE 0.5 MG: 1 INJECTION, SOLUTION INTRAMUSCULAR; INTRAVENOUS; SUBCUTANEOUS at 10:56

## 2019-09-11 RX ADMIN — PHYTONADIONE 2.5 MG: 5 TABLET ORAL at 06:52

## 2019-09-11 RX ADMIN — LORAZEPAM 1 MG: 2 INJECTION INTRAMUSCULAR; INTRAVENOUS at 16:45

## 2019-09-11 RX ADMIN — FUROSEMIDE 40 MG: 40 TABLET ORAL at 08:53

## 2019-09-11 RX ADMIN — LISINOPRIL 2.5 MG: 2.5 TABLET ORAL at 08:52

## 2019-09-11 RX ADMIN — SPIRONOLACTONE 25 MG: 25 TABLET ORAL at 08:52

## 2019-09-11 RX ADMIN — IPRATROPIUM BROMIDE AND ALBUTEROL SULFATE 3 ML: .5; 3 SOLUTION RESPIRATORY (INHALATION) at 20:24

## 2019-09-11 RX ADMIN — HYDROMORPHONE HYDROCHLORIDE 0.5 MG: 1 INJECTION, SOLUTION INTRAMUSCULAR; INTRAVENOUS; SUBCUTANEOUS at 23:54

## 2019-09-11 RX ADMIN — TIOTROPIUM BROMIDE 1 CAPSULE: 18 CAPSULE ORAL; RESPIRATORY (INHALATION) at 08:57

## 2019-09-11 RX ADMIN — ONDANSETRON 4 MG: 2 INJECTION INTRAMUSCULAR; INTRAVENOUS at 10:56

## 2019-09-11 RX ADMIN — ROSUVASTATIN CALCIUM 40 MG: 20 TABLET, FILM COATED ORAL at 08:53

## 2019-09-11 RX ADMIN — METOPROLOL SUCCINATE 25 MG: 25 TABLET, EXTENDED RELEASE ORAL at 08:53

## 2019-09-11 RX ADMIN — AMIODARONE HYDROCHLORIDE 200 MG: 200 TABLET ORAL at 08:53

## 2019-09-11 ASSESSMENT — LIFESTYLE VARIABLES
TOTAL SCORE: 0
EVER_SMOKED: YES
HAVE PEOPLE ANNOYED YOU BY CRITICIZING YOUR DRINKING: NO
AVERAGE NUMBER OF DAYS PER WEEK YOU HAVE A DRINK CONTAINING ALCOHOL: 0
TOTAL SCORE: 0
TOTAL SCORE: 0
HOW MANY TIMES IN THE PAST YEAR HAVE YOU HAD 5 OR MORE DRINKS IN A DAY: 0
CONSUMPTION TOTAL: NEGATIVE
EVER HAD A DRINK FIRST THING IN THE MORNING TO STEADY YOUR NERVES TO GET RID OF A HANGOVER: NO
HAVE YOU EVER FELT YOU SHOULD CUT DOWN ON YOUR DRINKING: NO
SUBSTANCE_ABUSE: 0
ALCOHOL_USE: NO
EVER FELT BAD OR GUILTY ABOUT YOUR DRINKING: NO
EVER_SMOKED: NEVER
ON A TYPICAL DAY WHEN YOU DRINK ALCOHOL HOW MANY DRINKS DO YOU HAVE: 0

## 2019-09-11 ASSESSMENT — COGNITIVE AND FUNCTIONAL STATUS - GENERAL
TURNING FROM BACK TO SIDE WHILE IN FLAT BAD: A LITTLE
PERSONAL GROOMING: A LITTLE
SUGGESTED CMS G CODE MODIFIER DAILY ACTIVITY: CK
DAILY ACTIVITIY SCORE: 18
HELP NEEDED FOR BATHING: A LITTLE
STANDING UP FROM CHAIR USING ARMS: A LOT
SUGGESTED CMS G CODE MODIFIER MOBILITY: CL
WALKING IN HOSPITAL ROOM: A LOT
DRESSING REGULAR LOWER BODY CLOTHING: A LOT
MOVING FROM LYING ON BACK TO SITTING ON SIDE OF FLAT BED: A LOT
DRESSING REGULAR UPPER BODY CLOTHING: A LITTLE
MOVING TO AND FROM BED TO CHAIR: A LITTLE
TOILETING: A LITTLE
CLIMB 3 TO 5 STEPS WITH RAILING: A LOT
MOBILITY SCORE: 14

## 2019-09-11 ASSESSMENT — ENCOUNTER SYMPTOMS
DOUBLE VISION: 0
HEADACHES: 0
SENSORY CHANGE: 0
DIARRHEA: 1
FLANK PAIN: 0
TINGLING: 0
BACK PAIN: 0
FEVER: 0
MYALGIAS: 0
TREMORS: 0
WEAKNESS: 0
BLURRED VISION: 0
CONSTIPATION: 0
VOMITING: 0
HEMOPTYSIS: 0
ABDOMINAL PAIN: 1
SORE THROAT: 0
WEIGHT LOSS: 0
CLAUDICATION: 0
CHILLS: 0
ORTHOPNEA: 0
NECK PAIN: 0
LOSS OF CONSCIOUSNESS: 0
MEMORY LOSS: 0
PHOTOPHOBIA: 0
ORTHOPNEA: 1
MYALGIAS: 1
DIZZINESS: 0
SPUTUM PRODUCTION: 0
WHEEZING: 0
SHORTNESS OF BREATH: 1
HEARTBURN: 0
PND: 0
VOMITING: 1
PALPITATIONS: 0
BLOOD IN STOOL: 0
COUGH: 0
NAUSEA: 1
DEPRESSION: 0

## 2019-09-11 ASSESSMENT — COPD QUESTIONNAIRES
DO YOU EVER COUGH UP ANY MUCUS OR PHLEGM?: NO/ONLY WITH OCCASIONAL COLDS OR INFECTIONS
HAVE YOU SMOKED AT LEAST 100 CIGARETTES IN YOUR ENTIRE LIFE: YES
COPD SCREENING SCORE: 4
DURING THE PAST 4 WEEKS HOW MUCH DID YOU FEEL SHORT OF BREATH: NONE/LITTLE OF THE TIME

## 2019-09-11 ASSESSMENT — PATIENT HEALTH QUESTIONNAIRE - PHQ9
1. LITTLE INTEREST OR PLEASURE IN DOING THINGS: NOT AT ALL
2. FEELING DOWN, DEPRESSED, IRRITABLE, OR HOPELESS: NOT AT ALL
SUM OF ALL RESPONSES TO PHQ9 QUESTIONS 1 AND 2: 0

## 2019-09-11 NOTE — ASSESSMENT & PLAN NOTE
Leukocytosis without fever likely secondary to acute pancreatitis.    Plan:  Pt received ceftriaxone in ED. Stopped antibiotics, no signs of infection.  Persistently elevated WBCs, however more elevated than from baseline.  WBC is downtrending  CTM with CBC outpatient.

## 2019-09-11 NOTE — ASSESSMENT & PLAN NOTE
Paroxysmal nonvalvular stable Atrial fibrillation without RVR controlled with warfarin, metoprolol, amiodarone with CHADsVASC:2 , HASBLED:2    Plan:  -Continue metoprolol, amiodarone  -Resumed warfarin.  -f/u with Cardiology, anticoagulation clinic outpatient.

## 2019-09-11 NOTE — PROGRESS NOTES
technitian from Lab called with critical result of INR of 8.53 and ptt of 74.5 at 0730. Critical lab result read back to technitian.   Dr. Thorne notified of critical lab result at 0740.  Critical lab result read back by Dr. Thorne.

## 2019-09-11 NOTE — CARE PLAN
Problem: Safety  Goal: Will remain free from falls  Outcome: PROGRESSING AS EXPECTED  Intervention: Assess risk factors for falls  Flowsheets (Taken 9/11/2019 0429)  Pt Calls for Assistance: Yes  Fall Risk: Risk to Fall -  0 - 1 point  History of fall: 0  Mobility Status Assessment: 2-2 Healthcare Providers Required for Assistance with Ambulation & Transfer  Intervention: Implement fall precautions  Flowsheets (Taken 9/11/2019 0429)  Bed Alarm: Patient Educated Regarding Fall Risk and Need for Bed Alarm, Understands and Continues to Refuse  Environmental Precautions: Treaded Slipper Socks on Patient; Bed in Low Position  Bedrails: Bedrails Closest to Bathroom Down  Chair/Bed Strip Alarm: Patient Educated Regarding Fall Risk and Need for Bed Alarm, Understands and Continues to Refuse  Note:   Patient refuses bed alarm, made aware of importance regarding fall and safety precautions, bed placed in lowest position, call light within reach, encouraged to call as needed     Problem: Knowledge Deficit  Goal: Knowledge of the prescribed therapeutic regimen will improve  Outcome: PROGRESSING AS EXPECTED  Note:   Patient updated regarding current plan of care, encouraged to participate in healthcare team decisions, all questions and concerns addressed at this time

## 2019-09-11 NOTE — ASSESSMENT & PLAN NOTE
Likely Pancreatic pseudocyst due to acute pancreatitis of unknown etiology with elevated lipase of >500 on admission.  Possible pancreatic mass.    Plan:  -CT pancreas done today showing: Multilocular cystic lesion in the pancreatic body which extends into the lesser sac with collection adjacent the medial margin of LEFT lobe liver.   -No surgical indication at this point   -GI recommends EUS to evaluate the lesion outpatient to differentiate mass vs pseudocyst. Ok to discharge patient on low fat diet.

## 2019-09-11 NOTE — SENIOR ADMIT NOTE
Senior Admit Note     CC: Abdominal pain     HPI:   Mr. Medrano is a pleasant 54 yo male with PMHx of CAD s/p CABG, Paroxysmal A.fib on warfarin, HFpEF 55%, HTN, SHASHANK, COPD and DM2 with recent admission for left calf hematoma who now presents with worsening abdominal pain over the last 1 week. Pain is centered over his ventral abdominal hernia and epigastric region. Pain is 7/10 made worse with sitting up and with eating. Pain is associated with nausea, 1 episode of emesis this morning and 3 episodes of loose stools and general malaise. He denies fever, chills, constipation or blood in his stool. He denies chest pain or worsening SOB, he is on chronic 2L oxygen.     In the ED, Temp 97.2m HR 70's, RR 20, BP 92//67, >95% on 2L oxygen. Labs remarkable for WBC 24.4 with left shift, H/H 18.2/59.9. Na 134, K 4.5, Glu 13, BUN/Cr 19/1.25, Alk phos 164, TP 8.4, lipase 586, LA 1.8, ProCal 0.31, Trop 37, INR 7.07. UA is cloudy with trace ketones, large occult blood, 100 protein, trace LE, 2-5 WBC.     CXR shows mild interstitial edema with cardiomegaly  CT abdomen pelvis reveals lobulated multiloculated cystic lesion in the pancreatic body measuring 4.1x 2.4 cm, per conversation with radiology imaging is not suggestive of hematoma. Other findings include mild splenomegaly, and stable complex fat-containing ventral/perumbilical hernia    Physical Exam   Constitutional: He is oriented to person, place, and time.   Chronically ill appearing male, resting comfortably in NAD   HENT:   Head: Normocephalic and atraumatic.   Mouth/Throat: Oropharynx is clear and moist. No oropharyngeal exudate.   Eyes: Pupils are equal, round, and reactive to light. Conjunctivae and EOM are normal. No scleral icterus.   Neck: Normal range of motion. Neck supple. No JVD present.   Cardiovascular: Normal rate and regular rhythm. Exam reveals no gallop and no friction rub.   Murmur heard.  2/6 holosystolic murmur   Pulmonary/Chest: Effort normal. No  respiratory distress.   Diminished breath sounds bilaterally, no obvious wheezing or crackles    Abdominal: Soft. There is tenderness. There is no rebound and no guarding.   Large right/ventral reducible hernia with tenderness to palpation, tenderness also in epigastric region.    Musculoskeletal: He exhibits edema.   Lymphadenopathy:     He has no cervical adenopathy.   Neurological: He is alert and oriented to person, place, and time. No cranial nerve deficit.   Skin: Skin is warm and dry.   Bilateral venous stasis changes of LE   Psychiatric: He has a normal mood and affect. Thought content normal.     Assessment and Plan:    52 yo male with complex PMHx who presents with worsening abdominal pain, nausea and vomiting found to have pancreatic mass and elevated lipase. Suspect symptoms may be related to pancreatitis and possible pseudocyst. Incarcerated abdominal hernia could also be possible however less likely as lactic acid is not elevated, hernia was reducible although painful, imaging does not show inflammation and/or edema and appears stable since previous imaging. Spoke with radiology to verify this. Patient also has gross hematuria and supra therapeutic INR.     # Acute Pancreatitis   # possible pancreatic pseudocyst vs mass   # Elevated troponin   # Leukocytosis   # supra therapeutic INR   # Erythrocytosis   # abdominal hernia   # CAD, HFpEF  # PAF  # DM II     - admit to telemetry   - pt given 500 cc bolus in ED, concern for volume overload given patients cardiac history and multiple admissions for volume overload causing respiratory failure.   - will give another 500 cc bolus and evaluate patients volume status, hold on continuous fluids and treat pancreatitis with intermittent bolus as tolerated   - NPO and supportive care with pain control   - pancreatic MRI pending   - given Ceftriaxone in ED, will not continue Abx at this time as there is no other evidence of infectious source, WBC is quite elevated  however on review this appears to be a chronic issue, consider Myeloproliferative disorder vs CML  - will given 2.5 mg Vit K for warfarin reversal given INR of >7, hematuria and concern for possible surgical issue if condition deteriorates   - elevated trop likely demand ischemia, trend trop   - continue home cardiac medications     Janie Garcia MD

## 2019-09-11 NOTE — ASSESSMENT & PLAN NOTE
Stable acute pancreatitis of unknown etiology complicated with pseudocyst with lipase >586 on admission    Plan:  -New onset multiloculated cystic lesion in pancreatic body with elevated lipase levels.  -Does not drink alcohol, no gallstones seen on CT scan, no hypertriglyceridemia  -Stopped IV fluids  -GI recommends EUS within 6 weeks outpatient. Ok to dc patient on low fat diet.

## 2019-09-11 NOTE — ASSESSMENT & PLAN NOTE
Reducible ventral abdominal wall hernia without any signs of incarceration or obstruction    Plan:  -No treatment indicated this time.   -F/u outpatient with general surgery.

## 2019-09-11 NOTE — PROGRESS NOTES
2 RN skin check done    Devices in place: nasal cannula  Skin assessed below devices: yes, intact  Confirmed/potential pressure ulcers: none  Preventative measures in place: pillows used for support to offload pressure to skin, frequent turning encouraged    Skin intact, BLE dusky, dry. Bilateral heels dry, boggy. Redness noted to back, blanching. Scab to LUE noted, dry. No wound care consult ordered

## 2019-09-11 NOTE — PROGRESS NOTES
Patient received from ER, brought up to unit by ruslan and assisted into hospital bed by wheelchair. Alertx4, able to make needs known. Tenderness to mid abdomen, no complaints of n/v on admission. Pain is controlled at this time. On tele monitor, afib. Updated regarding plan of care. Patient refusing bed alarm. Educated on importance of fall and safety precautions. NPO pending MRI this morning. Will continue to monitor    0450 Per patient's request, wife, Lisa phoned at this time and updated regarding patient's status and room/bed number. Per wife, she will bring in book with listed home medications to review PTA meds this morning.

## 2019-09-11 NOTE — CARE PLAN
Problem: Communication  Goal: The ability to communicate needs accurately and effectively will improve  Outcome: PROGRESSING AS EXPECTED   Pt is able to voice feelings and concerns related to patient care, and pt is able to rate pain on scale of 1-10    Problem: Safety  Goal: Will remain free from injury  Outcome: PROGRESSING AS EXPECTED  Goal: Will remain free from falls  Outcome: PROGRESSING AS EXPECTED   Pt has not had a fall or injury thus far this shift    Problem: Infection  Goal: Will remain free from infection  Outcome: PROGRESSING AS EXPECTED   Pt has no S/S of infection.    Problem: Venous Thromboembolism (VTW)/Deep Vein Thrombosis (DVT) Prevention:  Goal: Patient will participate in Venous Thrombosis (VTE)/Deep Vein Thrombosis (DVT)Prevention Measures  Outcome: PROGRESSING SLOWER THAN EXPECTED   Pt has an order for SCDs but has refused

## 2019-09-11 NOTE — ASSESSMENT & PLAN NOTE
# Chronic shortness of breath without respiratory failure, secondary to COPD and smoking,   # Gold stage 3 COPD controlled with inhalers and 2L NC home oxygen, with last FEV1 35% and FEV1/FVC of 74%.  # Chronic right sided heart failure with preserved ejection fraction without decompensation, last EF of 55, with a history of flash pulmonary edema.    Plan:  -Asymptomatic now.    -cxr-mild interstitial edema, no signs of pulmonary edema.  -Unlikely resp infection/pneumonia.

## 2019-09-11 NOTE — ASSESSMENT & PLAN NOTE
Gross hematuria likely secondary to supratherapeutic INR due to warfarin usage for paroxysmal Afib with urinary RBC >100.    Plan:  -Hold warfarin, aim for 2-3 INR.  -f/u outpatient if it did not resolve.

## 2019-09-11 NOTE — ASSESSMENT & PLAN NOTE
Supratherapeutic INR without any acute bleeding on presentation but h/o hematoma in the leg, on warfarin due to paroxysmal atrial fibrillation, with the INR >7 on admission     Plan:  -Vitamin K and FFP given.  -Monitor PT/INR with INR goal 2-3  -follow/up outpatient.

## 2019-09-11 NOTE — ED PROVIDER NOTES
"ED Provider Note    Scribed for Carolnia Gil D.O. by Sonal Pelletier. 9/10/2019, 8:49 PM.    Primary care provider: Nam Le M.D.  Means of arrival: Walk in   History obtained from: Patient  History limited by: None    CHIEF COMPLAINT  Chief Complaint   Patient presents with   • Abdominal Pain   • Loss of Appetite   • Nausea/Vomiting/Diarrhea       HPI  Joshua Medrano is a 53 y.o. Male who presents to the Emergency Department for evaluation of right sided abdominal pain onset last week. Patient reports the pain as being constant and being a \"twisting\" sensation. He reports movement exacerbates his pain. There are no alleviating factors reported at this time. Patient presents associated symptoms of vomiting and diarrhea, with the last episode being this morning. He describes his emesis episodes as being non bilious or having blood. He adds that he has a cough, and is short of breath. He denies any changes in appetite, urine urgency or frequency, passing gas, syncope, or seizures. Patient had a hernia surgery about 6 months ago which has since healed.     REVIEW OF SYSTEMS  See HPI for further details. All other systems are negative.     PAST MEDICAL HISTORY   has a past medical history of ASTHMA, Atrial fibrillation (HCC), CAD (coronary artery disease), Chronic obstructive pulmonary disease (HCC), Congestive heart failure (HCC), and Diabetes.    SURGICAL HISTORY   has a past surgical history that includes other abdominal surgery; multiple coronary artery bypass endo vein harvest (12/22/2017); oscar (12/22/2017); thoracoscopy (Left, 1/25/2018); and colonoscopy - endo (N/A, 7/25/2018). Hernia surgery approximately 6 months ago.    SOCIAL HISTORY  Social History     Tobacco Use   • Smoking status: Light Tobacco Smoker     Packs/day: 0.00     Years: 30.00     Pack years: 0.00     Types: Cigars, Cigarettes   • Smokeless tobacco: Never Used   • Tobacco comment: one cigarette a day   Substance Use Topics   • Alcohol " Progress note sent to fax number provided    "use: No   • Drug use: No      Social History     Substance and Sexual Activity   Drug Use No       FAMILY HISTORY  Family History   Problem Relation Age of Onset   • Diabetes Mother    • Stroke Mother    • Leukemia Mother    • Diabetes Father    • No Known Problems Sister    • Heart Disease Brother         PPM   • Lung Disease Brother    • Alcohol/Drug Brother    • Diabetes Sister        CURRENT MEDICATIONS  Reviewed.  See Encounter Summary.     ALLERGIES  Allergies   Allergen Reactions   • Cillins [Penicillins] Anaphylaxis and Rash     As a child; tolerated cefepime (12/2017), ceftriaxone (1/2018), cefazolin (12/2017)   • Erythromycin Anaphylaxis     Rxn - 1994   • Shellfish Allergy Anaphylaxis     Pt stated that he is allergic to all seafood, will develop a rash and says that rash will clear up with benadryl   • Metoprolol Unspecified     Pt stated got latham and aggressive, in demi dose's per wife.          PHYSICAL EXAM  VITAL SIGNS: /58   Pulse 75   Temp 36.2 °C (97.2 °F) (Temporal)   Resp 20   Ht 1.956 m (6' 5\")   Wt (!) 144.2 kg (318 lb)   SpO2 96%   BMI 37.71 kg/m²     Constitutional: Obese, Alert and in no apparent distress.  HENT: Nasal cannula present. Normocephalic atraumatic. Bilateral external ears normal. Mucous membranes are dry.  Eyes: Pupils are equal and reactive. Conjunctiva normal. Non-icteric sclera.   Neck: Normal range of motion without tenderness. Supple. No meningeal signs.  Cardiovascular: Diminished heart sounds bilaterally. Regular rate and rhythm. No murmurs, gallops or rubs.  Thorax & Lungs: Diminished breath sounds bilaterally. Breath sounds are clear to auscultation bilaterally. No wheezing, rhonchi or rales.  Abdomen: Distended asymmetrically with bulging on the right side. Tender to palpation of the right epigastrium with no evidence of peritonitis   Skin: Warm and dry. No rashes are noted.  Back: No bony tenderness, No CVA tenderness.   Extremities: 2+ peripheral " pulses. Cap refill is less than 2 seconds. No edema, cyanosis, or clubbing.  Musculoskeletal: Good range of motion in all major joints. No tenderness to palpation or major deformities noted.   Neurologic: Alert and oriented ×3. The patient moves all 4 extremities and follows commands.  Psychiatric: Affect is normal. Judgment appears to be intact.      DIAGNOSTIC STUDIES / PROCEDURES     LABS  Results for orders placed or performed during the hospital encounter of 09/10/19   CBC WITH DIFFERENTIAL   Result Value Ref Range    WBC 24.4 (H) 4.8 - 10.8 K/uL    RBC 7.29 (H) 4.70 - 6.10 M/uL    Hemoglobin 18.2 (H) 14.0 - 18.0 g/dL    Hematocrit 59.9 (H) 42.0 - 52.0 %    MCV 82.2 81.4 - 97.8 fL    MCH 25.0 (L) 27.0 - 33.0 pg    MCHC 30.4 (L) 33.7 - 35.3 g/dL    RDW 79.7 (H) 35.9 - 50.0 fL    Platelet Count 371 164 - 446 K/uL    MPV 9.7 9.0 - 12.9 fL    Neutrophils-Polys 87.90 (H) 44.00 - 72.00 %    Lymphocytes 3.10 (L) 22.00 - 41.00 %    Monocytes 5.30 0.00 - 13.40 %    Eosinophils 1.40 0.00 - 6.90 %    Basophils 0.90 0.00 - 1.80 %    Immature Granulocytes 1.40 (H) 0.00 - 0.90 %    Nucleated RBC 0.00 /100 WBC    Neutrophils (Absolute) 21.45 (H) 1.82 - 7.42 K/uL    Lymphs (Absolute) 0.76 (L) 1.00 - 4.80 K/uL    Monos (Absolute) 1.29 (H) 0.00 - 0.85 K/uL    Eos (Absolute) 0.34 0.00 - 0.51 K/uL    Baso (Absolute) 0.22 (H) 0.00 - 0.12 K/uL    Immature Granulocytes (abs) 0.33 (H) 0.00 - 0.11 K/uL    NRBC (Absolute) 0.00 K/uL    Hypochromia 1+     Anisocytosis 2+     Microcytosis 1+    COMP METABOLIC PANEL   Result Value Ref Range    Sodium 134 (L) 135 - 145 mmol/L    Potassium 4.5 3.6 - 5.5 mmol/L    Chloride 96 96 - 112 mmol/L    Co2 26 20 - 33 mmol/L    Anion Gap 12.0 (H) 0.0 - 11.9    Glucose 139 (H) 65 - 99 mg/dL    Bun 19 8 - 22 mg/dL    Creatinine 1.25 0.50 - 1.40 mg/dL    Calcium 9.5 8.5 - 10.5 mg/dL    AST(SGOT) 12 12 - 45 U/L    ALT(SGPT) 9 2 - 50 U/L    Alkaline Phosphatase 164 (H) 30 - 99 U/L    Total Bilirubin 0.8 0.1  - 1.5 mg/dL    Albumin 3.5 3.2 - 4.9 g/dL    Total Protein 8.4 (H) 6.0 - 8.2 g/dL    Globulin 4.9 (H) 1.9 - 3.5 g/dL    A-G Ratio 0.7 g/dL   LIPASE   Result Value Ref Range    Lipase 586 (H) 11 - 82 U/L   URINALYSIS,CULTURE IF INDICATED   Result Value Ref Range    Color Wandy     Character Cloudy (A)     Specific Gravity 1.025 <1.035    Ph 5.0 5.0 - 8.0    Glucose Negative Negative mg/dL    Ketones Trace (A) Negative mg/dL    Protein 100 (A) Negative mg/dL    Bilirubin Negative Negative    Urobilinogen, Urine 1.0 Negative    Nitrite Positive (A) Negative    Leukocyte Esterase Trace (A) Negative    Occult Blood Large (A) Negative    Micro Urine Req Microscopic    ESTIMATED GFR   Result Value Ref Range    GFR If African American >60 >60 mL/min/1.73 m 2    GFR If Non African American 60 >60 mL/min/1.73 m 2   TROPONIN   Result Value Ref Range    Troponin T 37 (H) 6 - 19 ng/L   MORPHOLOGY   Result Value Ref Range    RBC Morphology Present    PERIPHERAL SMEAR REVIEW   Result Value Ref Range    Peripheral Smear Review see below    PLATELET ESTIMATE   Result Value Ref Range    Plt Estimation Normal    DIFFERENTIAL COMMENT   Result Value Ref Range    Comments-Diff see below    LACTIC ACID   Result Value Ref Range    Lactic Acid 1.8 0.5 - 2.0 mmol/L   PROCALCITONIN   Result Value Ref Range    Procalcitonin 0.31 (H) <0.25 ng/mL   APTT   Result Value Ref Range    APTT 83.1 (H) 24.7 - 36.0 sec   PROTHROMBIN TIME   Result Value Ref Range    PT 64.0 (HH) 12.0 - 14.6 sec    INR 7.07 (HH) 0.87 - 1.13   URINE MICROSCOPIC (W/UA)   Result Value Ref Range    WBC 2-5 (A) /hpf    RBC >150 (A) /hpf    Bacteria Rare (A) None /hpf    Epithelial Cells Rare /hpf    Amorphous Crystal Present /hpf   EKG   Result Value Ref Range    Report       [facility] Emergency Dept.    Test Date:  2019-09-10  Pt Name:    JARRETT WHITE                   Department: ER  MRN:        [id number]                      Room:       [facility]  Gender:      Male                         Technician: [id number]  :        1965                   Requested By:ER TRIAGE PROTOCOL  Order #:    [id number]                    Reading MD: Carolina Gil    Measurements  Intervals                                Axis  Rate:       70                           P:          8  CO:         204                          QRS:        -72  QRSD:       112                          T:          13  QT:         432  QTc:        467    Interpretive Statements  SINUS RHYTHM  BORDERLINE AV CONDUCTION DELAY  INCOMPLETE RBBB AND LAFB  ANTERIOR Q WAVES, POSSIBLY DUE TO LVH  No acute ST elevation or depression noted  Abnormal EKG  Compared to ECG 2018 11:39:05  Incomplete right bundle-branch block now present  Left ventricular hypertrophy now present  Q waves now present   Myocardial infarct finding still present    Electronically Signed On 9- 21:14:32 PDT by Carolina Gil         All labs were reviewed by me.    EKG  EKG evaluated by me as shown above.    RADIOLOGY  CT-ABDOMEN-PELVIS WITH   Final Result         1.  New nonspecific lobulated/multiloculated cystic lesion in the pancreatic body measuring approximately 4.1 x 2.4 cm. Pancreatic mass protocol MRI of the abdomen is recommended for further evaluation.   2.  Splenomegaly, mildly improved from prior.   3.  Borderline hepatomegaly.   4.  Stable complex fat-containing ventral/periumbilical hernia.               DX-CHEST-PORTABLE (1 VIEW)   Final Result      1.  Cardiomegaly.      2.  Mild interstitial edema is favored over atypical infection.        The radiologist's interpretation of all radiological studies have been reviewed by me.    COURSE & MEDICAL DECISION MAKING  Pertinent Labs & Imaging studies reviewed. (See chart for details)    8:49 PM - Patient seen and examined at bedside. Ordered DX-Chest, Blood culture, Procalcitonin, Lactic acid, Troponin, estimated GFR, Morphology, Peripheral Smear review, Platelet estimate , differential  comment, CBC with diff., CMP, Lipase, Urinalysis, and EKG  to evaluate his symptoms. Patient was advise to be NPO in case surgery is needed.     9:07PM-Ordered APTT, prothrombin, and Urine microscopic.     12:17 AM Paged R [facility].     12:23 AM- Consulted with Critical access hospital about patient' case. Critical access hospital agrees to admit patient to their care,.    12:27 AM- Patient was reevaluated at bedside. Discussed lab and radiology results with the patient and informed them that there appears to be a mass on the pancreas that requires an MRI and admittance to the hospital. Discussed plan to place him on fluids for his dehydration.     HYDRATION: Based on the patient's presentation of Dehydration the patient was given IV fluids. IV Hydration was used because oral hydration was not adequate alone. Upon recheck following hydration, the patient was stable.     DISPOSITION:  Patient will be admitted to Critical access hospital in guarded condition.    Decision Making:  This is a 53 y.o. year old male who presents with abdominal pain.  On initial evaluation, the patient did not appear to be in any acute distress.  His vital signs were reassuring.  He did have a large ventral hernia with tenderness on the right side of his abdomen.  He did not demonstrate any evidence of peritonitis but did have more significant tenderness palpation in the epigastrium.  An EKG had been performed given his history of cardiac disease and epigastric pain.  No acute ischemia was noted and it appeared similar to his previous EKG although his first troponin was indeterminate at 37.  He was noted to have a white count of 24.4 and antibiotics were ordered for presumed intra-abdominal infection.  He did have RBCs and few bacteria in the urine but it is unclear if this is a source of his infection at this time although it should be covered by the antibiotics that were ordered.  Gentle fluids were given secondary to his reassuring vital signs and history of extensive cardiac issues.  His  lactic acid was normal, his procalcitonin was only minimally elevated, and I am less concerned for severe sepsis or bowel ischemia.  His INR was noted to be supratherapeutic at 7 and his warfarin was held.  He did not have any significant active gross bleeding, so he was not reversed at this time.  CT of the abdomen was performed and revealed a new nonspecific cystic lesion in the pancreas.  I updated the patient on this result as well as the results of the wrist of the work-up.  The plan was made to admit to treat for acute pancreatitis and further evaluation with an MRI of the abdomen given the new mass noted on the pancreas.  I called and discussed the case with [facility] who agreed with the plan and accepted the patient.  The patient remained stable while in the emergency department.    FINAL IMPRESSION  1. Acute pancreatitis, unspecified complication status, unspecified pancreatitis type    2. Pancreatic lesion    3. Supratherapeutic INR    4. Sepsis, due to unspecified organism (HCC)          Sonal LARA (Fernanod), am scribing for, and in the presence of, Carolina Gil D.O..    Electronically signed by: Sonal Pelletier (Fernando), 9/10/2019    Carolina LARA D.O. personally performed the services described in this documentation, as scribed by Sonal Pelletier in my presence, and it is both accurate and complete.    C    The note accurately reflects work and decisions made by me.  Carolina Gil  9/11/2019  1:05 AM

## 2019-09-11 NOTE — PROGRESS NOTES
"       Internal Medicine Interval Note  Note Author: Didier Thorne M.D.     Name Joshua Medrano     1965   Age/Sex 53 y.o. male   MRN [id number]   Code Status Full     After 5PM or if no immediate response to page, please call for cross-coverage  Attending/Team: Dr Velasquez / Camilo See Patient List for primary contact information  Call [phone] to page    1st Call - Day Intern (R1):   Dr Thorne 2nd Call - Day Sr. Resident (R2/R3):   Dr Perales         Reason for interval visit  (Principal Problem)   Abdominal pain  Pancreatic lesion  Abdominal wall hernia    Interval Problem Daily Status Update  (24 hours, problem oriented, brief subjective history, new lab/imaging data pertinent to that problem)   This is 54 y/o M with PMHx of CAD s/p CABG, paroxysmal Afib on warfarin, HFpEF last EF 55%, COPD, DM2 diet controlled, SHASHANK came to the ER with \"twisting\" like abdominal pain on the epigastric region. He was found to have pancreatic lesion on CT scan and lipase levels were around 500s at the time. Admitted for further workup for pancreatic lesion.    S: Has abdominal pain, reports it is fairly relieved compared to yesterday.  O: Vitals stable at night. Blood cx negative so far. Hyponatremia, given IV fluids (bolus). No signs of hypervolemia but has HFpEF history. Alk phosphatase still elevated without AST/ALT changes. Leukocytosis with neutrophilia, erythrocytosis persists. INR increased despite vit K    Events:  -MRI abdomen - pending.  -Will monitor INR  -GI, GS onboard.  -Possible IR consultation after MRI if he needs aspiration.  -Pain control with hydromorphone. Control nausea with zofran, QT normal.  -Per GI: \"MRI -> If mass, location of mass will determine if possible to biopsy. If lobulation seen, draining of cyst is not recommended. If without lobulation, generally recommended to drain if greater than 6cm. For any procedure, INR will need to be corrected prior to procedure.\" Recommended spirometer, Hb " and INR monitoring daily.      Review of Systems   Constitutional: Negative for chills and fever.   HENT: Negative for hearing loss and sore throat.    Eyes: Negative for blurred vision and double vision.   Respiratory: Positive for shortness of breath. Negative for cough, sputum production and wheezing.    Cardiovascular: Positive for leg swelling. Negative for chest pain, palpitations, orthopnea and PND.   Gastrointestinal: Positive for abdominal pain, diarrhea and nausea. Negative for blood in stool, constipation, heartburn, melena and vomiting.   Genitourinary: Negative for dysuria, flank pain, frequency, hematuria and urgency.   Musculoskeletal: Negative for joint pain and myalgias.   Skin: Negative for rash.   Neurological: Negative for dizziness, tingling, sensory change, loss of consciousness, weakness and headaches.   Psychiatric/Behavioral: Negative for depression and memory loss.       Disposition/Barriers to discharge:   None    Consultants/Specialty  GI -> CHENTE Maki  PCP: Nam Le M.D.      Quality Measures  Quality-Core Measures   Ceballos catheter::  No Ceballos  DVT prophylaxis pharmacological::  Contraindicated - High bleeding risk  DVT prophylaxis - mechanical:  SCDs          Physical Exam       Vitals:    09/11/19 0330 09/11/19 0812 09/11/19 1233 09/11/19 1554   BP: 102/67 107/68 122/69 116/72   Pulse: 70 68 77 77   Resp: 16 18 18 18   Temp: 36.8 °C (98.2 °F) 35.9 °C (96.6 °F) 36.4 °C (97.5 °F) 36.4 °C (97.5 °F)   TempSrc: Temporal Temporal Temporal Temporal   SpO2: 97% 94% 94% 94%   Weight: (!) 126.6 kg (279 lb 1.6 oz)      Height:         Body mass index is 33.1 kg/m². Weight: (!) 126.6 kg (279 lb 1.6 oz)  Oxygen Therapy:  Pulse Oximetry: 94 %, O2 (LPM): 2, O2 Delivery: Silicone Nasal Cannula    Physical Exam   Constitutional: He is oriented to person, place, and time and well-developed, well-nourished, and in no distress. No distress.   HENT:   Head: Normocephalic and  atraumatic.   Mouth/Throat: No oropharyngeal exudate.   Eyes: Pupils are equal, round, and reactive to light. Conjunctivae and EOM are normal. Right eye exhibits no discharge. No scleral icterus.   Neck: Normal range of motion. No JVD present. No tracheal deviation present.   Cardiovascular: Normal rate, regular rhythm and intact distal pulses. Exam reveals no gallop and no friction rub.   Murmur (Systolic, 3/6) heard.  Pulmonary/Chest: Effort normal and breath sounds normal. No stridor. No respiratory distress. He has no wheezes. He has no rales. He exhibits no tenderness.   Abdominal: He exhibits no distension and no mass. Bowel sounds are hypoactive. There is tenderness in the epigastric area. There is no rebound and no guarding.       Musculoskeletal: He exhibits no edema, tenderness or deformity.        Legs:  Neurological: He is alert and oriented to person, place, and time. No cranial nerve deficit. Gait normal. Coordination normal.   Skin: No rash noted. He is not diaphoretic. No erythema. No pallor.   Psychiatric: Affect normal.   Vitals reviewed.            Assessment/Plan     Supratherapeutic INR  Assessment & Plan  Pt on warfarin for paroxysmal atrial fibrillation, with supratherapeutic INR of 7.07 on admission    Plan:  -Hold warfarin  -vit-K 2.5 mg + 5mg  -CTM with frequent PT/INR  -Assess the patient for active bleeding.  -Monitor Hb daily.    Hematuria  Assessment & Plan  Gross hematuria with >100 RBC in UA  Supratherapeutic INR    Plan:  -Hold warfarin  -vit k 2.5 mg + 5mg  -CTM with repeated frequent PT/INR, daily CBC.    Pancreatic lesion  Assessment & Plan  Newly found ,multiloculated cystic mass in pancreas in the setting of elevated lipase, could be the cause of the symptoms  See A&P of abdominal pain.  Palpable mass+  Elevated lipase    Differentials include:   -Pancreatic hematoma due to warfarin   -Pancreatic cystic neoplasms. Consider fine needle aspiration.   -Incidental. 2% cases. Usually  asymptomatic but may cause abdominal pain   -Necrotizing pancreatitis or pancreatic pseudocyst   -Benign pancreatic cysts.    Plan:  -MRI abdomen for evaluation of pancreatic mass.  -Consulted general surgery appreciate recommendations.  -Consulted GI:    ->Wait for the MRI.   If mass, location will define biopsy.   If lobulated cyst, no drainage   If the cyst not lobulated, drain if its > 6cm.   ->Incentive spirometry q1h  -Consider IR consultation after MRI.    Abdominal pain- (present on admission)  Assessment & Plan  Worsening abdominal pain with nausea and vomiting, new onset multiloculated cystic lesion in pancreatic body with elevated lipase levels.    Plan  -NPO  -IV fluid intermittent bolus after assessing volume status concerning his cardiac history and episodes of flash pulm edema in the past.  -IV hydromorphone prn for pain  -MRI abdomen for evaluation of pancreatic mass.    Abdominal hernia  Assessment & Plan  Ventral abdominal hernia, reducible, tender.  No signs of incarceration/obstruction on CT abdomen.  Lactate 1.8.    Shortness of breath  Assessment & Plan  -h/o COPD, smoking,   -on inhalers and 2L oxygen NC at home at night.  -cxr-mild interstitial edema, no signs of pulmonary edema.  -Unlikely resp infection/pneumonia.    Plan:  -CTM his oxygen requirements  -If worsening, consider flash pulmonary edema.    Paroxysmal atrial fibrillation (HCC)  Assessment & Plan  On warfarin.  Asymptomatic.  Hard to get stable therapeutic range of warfarin in outpatient.    Plan:  -Hold warfarin due to bleeding risk and supratherapeutic INR.      Leukocytosis- (present on admission)  Assessment & Plan  Elevated WBC-24.4 on admission, stress related vs inflammation/infection.  WBC trending down.  Pt received ceftriaxone in ED. Stopped antibiotics, no signs of infection.    Plan:  -CTM WBC daily.

## 2019-09-11 NOTE — ED TRIAGE NOTES
Chief Complaint   Patient presents with   • Abdominal Pain   • Loss of Appetite   • Nausea/Vomiting/Diarrhea     Symptoms X 2 days.  Triage process explained to patient.  Pt back to waiting room.  Pt instructed to inform RN if any changes or questions arise.

## 2019-09-11 NOTE — PROGRESS NOTES
Roxanne from Lab called with critical result of PT 79.7 and INR 9.3 at 1345. Critical lab result read back to Roxanne.   Paged MD at 1350.  Second page to MD at 1414.   Dr. Thorne notified of critical lab result at 1501.  Critical lab result read back by Dr. Thorne.

## 2019-09-11 NOTE — ED NOTES
Admitting MD at bedside  
Bedside report given to MIN Plata  
Called admission floor to notify pt is ready.   
ERP at bedside updating pt on poc,  
Lab at bedside drawing second set of blood cultures   
Lab at bedside for blood draw   
Lab called for blood draw  
Lab called with  A critical PT of 64.0 and INR of 7.07 ERP notified   
Med select on global override.  Unable to waste in med- select.  Waster .5ml/.5mg dilaudid with Kerry Espinoza RN.  
PIV established, blood drawn and sent to lab. Patient attached to cardiac leads and pulse ox. Updated on poc, ERP at bedside  
Pt to ct   
Report to Lucila COPELAND   
Rounded on patient, states his pain his more controlled, assisted with urinal. Denies further needs at this time   
no

## 2019-09-11 NOTE — H&P
Internal Medicine Admitting History and Physical    Note Author: Garcia Tovar M.D.       Name Joshua Medrano     1965   Age/Sex 53 y.o. male   MRN [id number]   Code Status  full code     After 5PM or if no immediate response to page, please call for cross-coverage  Attending/Team: /ANDREAS See Patient List for primary contact information  Call [phone] to page    1st Call - Day Intern (R1):    2nd Call - Day Sr. Resident (R2/R3):   Dr. Perales       Chief Complaint:   Abdominal pain, nausea, vomiting and diarrhea.    HPI:  Mr. Medrano is a 53-year-old gentleman with known past medical history of CAD status post CABG, paroxysmal atrial fibrillation on Coumadin, heart failure with preserved ejection fraction(55%), diabetes, COPD, SHASHANK, hypertension came with complaints of worsening abdominal pain for past 1 week.  Mentioned crampy abdominal pain in and around lower part of abdomen radiating to left epigastric region, intermittent, pain was gradual in onset with increasing severity.  Abdominal pain is associated with nausea and had one episode of emesis this morning, 2-3 episodes of loose stools.  Patient denied any blood in the vomitus, hematemesis or melena.  His abdominal pain worsened with abduction of food.  He has a ventral abdominal hernia, worsening pain and tenderness around the hernia.  Mentioned decreased food intake for the past few weeks, did not notice any weight changes.  He was discharged recently after admission for left calf hematoma.  His medical history is significant for COPD and is compliant with his medication, uses oxygen at home at 2 L/min at night times.  He mentioned complains of shortness of breath, attributing to worsening abdominal pain, feels that abdomen is pressing on lungs.  He denied any complaints of fever, chills, headache, dizziness, chest pain, palpitations.    ED course:  Labs-CBC revealed leukocytosis with WBC of 24.4, hemoglobin of 18.2,  platelets of 371  CMP revealed sodium-134, creatine-1.25, anion gap of 12 with elevated alkaline phosphatase-164 and lipase (586), lactic acid of 1.8, troponin T of 37.  PT/INR-64/7.07 and APTT-83.1  Urinalysis-positive for occult blood with more than 150 RBC, nitrate positive and trace ketone positive.  CT abdomen pelvis-revealed new nonspecific lobulated/multiloculated cystic lesion in the pancreatic body, recommended a MRI of the abdomen as part of hydrating mass protocol  Stable complex fat-containing ventral/periumbilical hernia (no signs of obstructed/incarcerated periumbilical hernia)   chest x-ray-mild interstitial edema favored over atypical infection, cardiomegaly    Review of Systems   Constitutional: Positive for malaise/fatigue. Negative for chills, fever and weight loss.   HENT: Negative for ear discharge, ear pain, hearing loss and tinnitus.    Eyes: Negative for blurred vision, double vision and photophobia.   Respiratory: Positive for shortness of breath. Negative for cough, hemoptysis, sputum production and wheezing.    Cardiovascular: Positive for orthopnea and leg swelling. Negative for chest pain, palpitations and claudication.        Leg swelling predominant in the left leg, mild pitting edema noticed   Gastrointestinal: Positive for abdominal pain, diarrhea, nausea and vomiting. Negative for blood in stool, constipation, heartburn and melena.   Genitourinary: Positive for hematuria. Negative for dysuria, frequency and urgency.   Musculoskeletal: Positive for myalgias. Negative for back pain, joint pain and neck pain.   Skin: Negative for rash.   Neurological: Negative for dizziness, tingling, tremors, sensory change and headaches.   Psychiatric/Behavioral: Negative for depression, substance abuse and suicidal ideas.             Past Medical History (Chronic medical problem, known complications and current treatment)    Past Medical History:   Diagnosis Date   • ASTHMA    • Atrial fibrillation  (HCC)    • CAD (coronary artery disease)    • Chronic obstructive pulmonary disease (HCC)    • Congestive heart failure (HCC)    • Diabetes          Past Surgical History:  Past Surgical History:   Procedure Laterality Date   • COLONOSCOPY - ENDO N/A 7/25/2018    Procedure: COLONOSCOPY - ENDO;  Surgeon: Josiah Molina D.O.;  Location: ENDOSCOPY [facility];  Service: Gastroenterology   • THORACOSCOPY Left 1/25/2018    Procedure: THORACOSCOPY- PLEURODESIS ;  Surgeon: Chandu Paez M.D.;  Location: SURGERY [facility];  Service: General   • MULTIPLE CORONARY ARTERY BYPASS ENDO VEIN HARVEST  12/22/2017    Procedure: MULTIPLE CORONARY ARTERY BYPASS ENDO VEIN HARVEST X2;  Surgeon: Kiel Ash M.D.;  Location: SURGERY [facility];  Service: Cardiothoracic   • JIM  12/22/2017    Procedure: JIM;  Surgeon: Kiel Ash M.D.;  Location: SURGERY [facility];  Service: Cardiothoracic   • OTHER ABDOMINAL SURGERY         Current Outpatient Medications:  Home Medications    **Home medications have not yet been reviewed for this encounter**         Medication Allergy/Sensitivities:  Allergies   Allergen Reactions   • Cillins [Penicillins] Anaphylaxis and Rash     As a child; tolerated cefepime (12/2017), ceftriaxone (1/2018), cefazolin (12/2017)   • Erythromycin Anaphylaxis     Rxn - 1994   • Shellfish Allergy Anaphylaxis     Pt stated that he is allergic to all seafood, will develop a rash and says that rash will clear up with benadryl   • Metoprolol Unspecified     Pt stated got latham and aggressive, in demi dose's per wife.            Family History (mandatory)   Family History   Problem Relation Age of Onset   • Diabetes Mother    • Stroke Mother    • Leukemia Mother    • Diabetes Father    • No Known Problems Sister    • Heart Disease Brother         PPM   • Lung Disease Brother    • Alcohol/Drug Brother    • Diabetes Sister        Social History (mandatory)   Social History     Socioeconomic History    • Marital status:      Spouse name: Not on file   • Number of children: Not on file   • Years of education: Not on file   • Highest education level: Not on file   Occupational History   • Not on file   Social Needs   • Financial resource strain: Not on file   • Food insecurity:     Worry: Not on file     Inability: Not on file   • Transportation needs:     Medical: Not on file     Non-medical: Not on file   Tobacco Use   • Smoking status: Light Tobacco Smoker     Packs/day: 0.00     Years: 30.00     Pack years: 0.00     Types: Cigars, Cigarettes   • Smokeless tobacco: Never Used   • Tobacco comment: one cigarette a day   Substance and Sexual Activity   • Alcohol use: No   • Drug use: No   • Sexual activity: Not on file   Lifestyle   • Physical activity:     Days per week: Not on file     Minutes per session: Not on file   • Stress: Not on file   Relationships   • Social connections:     Talks on phone: Not on file     Gets together: Not on file     Attends Nondenominational service: Not on file     Active member of club or organization: Not on file     Attends meetings of clubs or organizations: Not on file     Relationship status: Not on file   • Intimate partner violence:     Fear of current or ex partner: Not on file     Emotionally abused: Not on file     Physically abused: Not on file     Forced sexual activity: Not on file   Other Topics Concern   • Not on file   Social History Narrative   • Not on file     Living situation:   PCP : Nam Le M.D.    Physical Exam     Vitals:    09/11/19 0145 09/11/19 0200 09/11/19 0215 09/11/19 0230   BP:  111/64  119/71   Pulse: 71 68 71 74   Resp:       Temp:       TempSrc:       SpO2: 98% 100% 100% 100%   Weight:       Height:         Body mass index is 37.71 kg/m².  O2 therapy: Pulse Oximetry: 100 %, O2 Delivery: None (Room Air)    Physical Exam   Constitutional: He is oriented to person, place, and time and well-developed, well-nourished, and in no distress.   HENT:    Head: Normocephalic and atraumatic.   Eyes: Pupils are equal, round, and reactive to light. Conjunctivae and EOM are normal.   Neck: Normal range of motion. Neck supple. No thyromegaly present.   Cardiovascular: Normal rate and regular rhythm.   Systolic murmur noticed   Pulmonary/Chest: Effort normal. No respiratory distress.   Diminished breath sounds   Abdominal: Soft. Bowel sounds are normal. He exhibits mass. He exhibits no distension. There is tenderness. There is no rebound and no guarding.   Protruding hernia evident next to the umbilicus.  Diffuse tenderness elicited, worse at the site of hernia and epigastric region    Musculoskeletal: Normal range of motion. He exhibits edema.   Prominent in left leg compared to left leg   Neurological: He is alert and oriented to person, place, and time. Gait normal.   Skin: Skin is dry.   Psychiatric: Mood, memory, affect and judgment normal.         Data Review       Old Records Request:   Completed  Current Records review/summary: Completed    Lab Data Review:  Recent Results (from the past 24 hour(s))   CBC WITH DIFFERENTIAL    Collection Time: 09/10/19  8:09 PM   Result Value Ref Range    WBC 24.4 (H) 4.8 - 10.8 K/uL    RBC 7.29 (H) 4.70 - 6.10 M/uL    Hemoglobin 18.2 (H) 14.0 - 18.0 g/dL    Hematocrit 59.9 (H) 42.0 - 52.0 %    MCV 82.2 81.4 - 97.8 fL    MCH 25.0 (L) 27.0 - 33.0 pg    MCHC 30.4 (L) 33.7 - 35.3 g/dL    RDW 79.7 (H) 35.9 - 50.0 fL    Platelet Count 371 164 - 446 K/uL    MPV 9.7 9.0 - 12.9 fL    Neutrophils-Polys 87.90 (H) 44.00 - 72.00 %    Lymphocytes 3.10 (L) 22.00 - 41.00 %    Monocytes 5.30 0.00 - 13.40 %    Eosinophils 1.40 0.00 - 6.90 %    Basophils 0.90 0.00 - 1.80 %    Immature Granulocytes 1.40 (H) 0.00 - 0.90 %    Nucleated RBC 0.00 /100 WBC    Neutrophils (Absolute) 21.45 (H) 1.82 - 7.42 K/uL    Lymphs (Absolute) 0.76 (L) 1.00 - 4.80 K/uL    Monos (Absolute) 1.29 (H) 0.00 - 0.85 K/uL    Eos (Absolute) 0.34 0.00 - 0.51 K/uL    Baso  (Absolute) 0.22 (H) 0.00 - 0.12 K/uL    Immature Granulocytes (abs) 0.33 (H) 0.00 - 0.11 K/uL    NRBC (Absolute) 0.00 K/uL    Hypochromia 1+     Anisocytosis 2+     Microcytosis 1+    COMP METABOLIC PANEL    Collection Time: 09/10/19  8:09 PM   Result Value Ref Range    Sodium 134 (L) 135 - 145 mmol/L    Potassium 4.5 3.6 - 5.5 mmol/L    Chloride 96 96 - 112 mmol/L    Co2 26 20 - 33 mmol/L    Anion Gap 12.0 (H) 0.0 - 11.9    Glucose 139 (H) 65 - 99 mg/dL    Bun 19 8 - 22 mg/dL    Creatinine 1.25 0.50 - 1.40 mg/dL    Calcium 9.5 8.5 - 10.5 mg/dL    AST(SGOT) 12 12 - 45 U/L    ALT(SGPT) 9 2 - 50 U/L    Alkaline Phosphatase 164 (H) 30 - 99 U/L    Total Bilirubin 0.8 0.1 - 1.5 mg/dL    Albumin 3.5 3.2 - 4.9 g/dL    Total Protein 8.4 (H) 6.0 - 8.2 g/dL    Globulin 4.9 (H) 1.9 - 3.5 g/dL    A-G Ratio 0.7 g/dL   LIPASE    Collection Time: 09/10/19  8:09 PM   Result Value Ref Range    Lipase 586 (H) 11 - 82 U/L   ESTIMATED GFR    Collection Time: 09/10/19  8:09 PM   Result Value Ref Range    GFR If African American >60 >60 mL/min/1.73 m 2    GFR If Non African American 60 >60 mL/min/1.73 m 2   MORPHOLOGY    Collection Time: 09/10/19  8:09 PM   Result Value Ref Range    RBC Morphology Present    PERIPHERAL SMEAR REVIEW    Collection Time: 09/10/19  8:09 PM   Result Value Ref Range    Peripheral Smear Review see below    PLATELET ESTIMATE    Collection Time: 09/10/19  8:09 PM   Result Value Ref Range    Plt Estimation Normal    DIFFERENTIAL COMMENT    Collection Time: 09/10/19  8:09 PM   Result Value Ref Range    Comments-Diff see below    APTT    Collection Time: 09/10/19  8:09 PM   Result Value Ref Range    APTT 83.1 (H) 24.7 - 36.0 sec   PROTHROMBIN TIME    Collection Time: 09/10/19  8:09 PM   Result Value Ref Range    PT 64.0 () 12.0 - 14.6 sec    INR 7.07 () 0.87 - 1.13   EKG    Collection Time: 09/10/19  8:44 PM   Result Value Ref Range    Report       [facility] Emergency Dept[facility]    Test Date:   2019-09-10  Pt Name:    JARRETT WHITE                   Department: ER  MRN:        [id number]                      Room:       [facility]  Gender:     Male                         Technician: [id number]  :        1965                   Requested By:ER TRIAGE PROTOCOL  Order #:    [id number]                    Reading MD: Carolina Gil    Measurements  Intervals                                Axis  Rate:       70                           P:          8  TX:         204                          QRS:        -72  QRSD:       112                          T:          13  QT:         432  QTc:        467    Interpretive Statements  SINUS RHYTHM  BORDERLINE AV CONDUCTION DELAY  INCOMPLETE RBBB AND LAFB  ANTERIOR Q WAVES, POSSIBLY DUE TO LVH  No acute ST elevation or depression noted  Abnormal EKG  Compared to ECG 2018 11:39:05  Incomplete right bundle-branch block now present  Left ventricular hypertrophy now present  Q waves now present   Myocardial infarct finding still present    Electronically Signed On 9- 21:14:32 PDT by Carolina Gil     TROPONIN    Collection Time: 09/10/19  8:57 PM   Result Value Ref Range    Troponin T 37 (H) 6 - 19 ng/L   LACTIC ACID    Collection Time: 09/10/19  8:57 PM   Result Value Ref Range    Lactic Acid 1.8 0.5 - 2.0 mmol/L   PROCALCITONIN    Collection Time: 09/10/19  8:57 PM   Result Value Ref Range    Procalcitonin 0.31 (H) <0.25 ng/mL   URINALYSIS,CULTURE IF INDICATED    Collection Time: 09/10/19  9:25 PM   Result Value Ref Range    Color Wandy     Character Cloudy (A)     Specific Gravity 1.025 <1.035    Ph 5.0 5.0 - 8.0    Glucose Negative Negative mg/dL    Ketones Trace (A) Negative mg/dL    Protein 100 (A) Negative mg/dL    Bilirubin Negative Negative    Urobilinogen, Urine 1.0 Negative    Nitrite Positive (A) Negative    Leukocyte Esterase Trace (A) Negative    Occult Blood Large (A) Negative    Micro Urine Req Microscopic    URINE MICROSCOPIC (W/UA)    Collection Time: 09/10/19   9:25 PM   Result Value Ref Range    WBC 2-5 (A) /hpf    RBC >150 (A) /hpf    Bacteria Rare (A) None /hpf    Epithelial Cells Rare /hpf    Amorphous Crystal Present /hpf   TROPONIN    Collection Time: 09/11/19 12:48 AM   Result Value Ref Range    Troponin T 40 (H) 6 - 19 ng/L       Imaging/Procedures Review:    Independant Imaging Review: Completed  CT-ABDOMEN-PELVIS WITH   Final Result         1.  New nonspecific lobulated/multiloculated cystic lesion in the pancreatic body measuring approximately 4.1 x 2.4 cm. Pancreatic mass protocol MRI of the abdomen is recommended for further evaluation.   2.  Splenomegaly, mildly improved from prior.   3.  Borderline hepatomegaly.   4.  Stable complex fat-containing ventral/periumbilical hernia.               DX-CHEST-PORTABLE (1 VIEW)   Final Result      1.  Cardiomegaly.      2.  Mild interstitial edema is favored over atypical infection.      MR-MRA ABDOMEN-WITH & W/O    (Results Pending)            EKG:   EKG Independent Review: Completed  QTc: 467, HR: 70, Normal Sinus Rhythm, no ST/T changes     Records reviewed and summarized in current documentation :  Yes  [employer] teaching service handout given to patient:  No         Assessment/Plan     Supratherapeutic INR  Assessment & Plan  Pt on warfarin for paroxysmal atrial fibrillation, with supratherapeutic INR of 7.07  -withheld warfarin  -vit-K 2.5 mg  -will monitor PT/INR and closely follow for any bleeding manifestations.    Hematuria  Assessment & Plan  Occult blood in urine with plenty of RBC, supratherapeutic INR of 7.07, likely the cause of hematuria. No casts noticed. Creatinine-1.25  -withhold warfarin  -vit k 2.5 mg    Abdominal pain  Assessment & Plan  Worsening abdominal pain with nausea and vomiting, new onset multiloculated cystic lesion in pancreatic body with elevated lipase levels.  Continue acute pancreatitis vs pseudopancreatic cyst vs incarcerated hernia.  -NPO  -IV fluid intermittent bolus after assessing  volume status concerning his cardiac history and episodes of flash pulm edema in the past.  -IV morphine prn for pain  -MRI abdomen for evaluation of pancreatic mass.    Abdominal hernia  Assessment & Plan  Ventral abdominal hernia, reducible, tender.  No signs of incarceration/obstruction on CT abdomen.  Lactate 1.8.  May need surgical evaluation if any signs of obstruction or ischemic changes.    Pancreatic cyst  Assessment & Plan  Newly found ,multiloculated cystic mass in pancreas in the setting of elevated lipase, could be the cause of the symptoms  MRI abdomen for evaluation of pancreatic mass.  See A&P of abdominal pain.    Shortness of breath  Assessment & Plan  Known history of COPD, history of occasional smoking, on inhalers and oxygen supplementation of 2 lpm at home at night.  cxr-mild interstitial edema, no signs of pulmonary edema.  Unlikely resp infection/pneumonia.  Will monitor his oxygen requirements and any hypoxemia.    Leukocytosis- (present on admission)  Assessment & Plan  Elevated WBC-24.4, but no fever, chills or any other source of infection.  Pt received ceftriaxone in ED, will not continue antibiotics at this point as no other signs of infection  Elevated white cell count could be due to inflammation in the pancreas, will monitor wbc count and signs of infection and will evaluate periodically.      Anticipated Hospital stay:  >2 midnights        Quality Measures  Quality-Core Measures   Reviewed items::  EKG reviewed, Labs reviewed, Medications reviewed and Radiology images reviewed  Ceballos catheter::  No Ceballos  DVT: supratherapeutic INR.  DVT prophylaxis - mechanical:  SCDs    PCP: Nam Le M.D.

## 2019-09-11 NOTE — PROGRESS NOTES
RN called [employer] Camilo Thorne in regards to pt having abdominal pain 7/10 in upper left quadrant abdomen. Pt also stated that he does not want morphine since it makes him nauseated. Dr. Thorne stated he will change the order to dilauded.

## 2019-09-11 NOTE — CONSULTS
Consults                                              Gastroenterology Consult Note     Date of Consult: 9/11/2019  Referring Physician: Garcia Dunlap M.D.      Reason for consult: Pancreatitis        HPI: Mr Medrano is a 53 yr old male admitted yesterday through the ER after 3-4 days of abdominal pain with nausea with acid stomach. Vomiting x 1. Diarrhea 4-6 times a day, watery. Denies fevers or shaking chills. Denies hematemesis, coffee ground emesis, bloody or melanous stool. No history of pancreatitis or cholecystitis. Denies dysphagia, odynophagia, chest pain or pressure. Has intermittent constipation. He has eaten a small amount of jello since admission without increased abdominal pain after. He has been on cardiac diet, low sodium. No fluid restriction. He drinks most of a 2 liters bottle of regular soda every couple of days.     On admission 9/10/2019, his INR was found to be 8.53, today 7.45. He received Vit K 2.5mg today. He notes his INR has been      fluctuating greatly since June.      CT abdomen/pelvis showed new pancreatic cyst 4.1 x 2.4 cm, lobulated, within the body of the pancreas with few calcifications    seen in within. Spleen is 16.5  cm which is improved from last scan. Diverticulosis. 5mm non-obstructive right renal stone.         Pancreatic pseudocyst possible hematoma vs cystic mass vs malignancy.  He has been NPO and is scheduled for MRI of         pancreas today.     He sustained a fall at home in late July, landing on carpet. He fell due to feeling fatigued after camping trip, landing full frontal, possibly catching left arm under rib cage. He states he had a bruise in that area shortly after fall. He did not seek medical attention at that time.   He was recently discharged [facility] on 8/24/2019 after 10 day stay for traumatic hematoma of LL leg, exacerbation of COPD. No surgical intervention. INR on admission 4.55, given FFP and Vit K.     Family history positive for  pancreatic cancer in brother at age 65. Denies family history of colon cancer. Last colonoscopy 7/25/2018.     PMHX:  Past Medical History:   Diagnosis Date   • ASTHMA    • Atrial fibrillation (HCC)    • CAD (coronary artery disease)    • Chronic obstructive pulmonary disease (HCC)    • Congestive heart failure (HCC)    • Diabetes           PSurgHx:   Past Surgical History:   Procedure Laterality Date   • COLONOSCOPY - ENDO N/A 7/25/2018    Procedure: COLONOSCOPY - ENDO;  Surgeon: Josiah Molina D.O.;  Location: ENDOSCOPY [facility];  Service: Gastroenterology   • THORACOSCOPY Left 1/25/2018    Procedure: THORACOSCOPY- PLEURODESIS ;  Surgeon: Chandu Paez M.D.;  Location: SURGERY [facility] [place];  Service: General   • MULTIPLE CORONARY ARTERY BYPASS ENDO VEIN HARVEST  12/22/2017    Procedure: MULTIPLE CORONARY ARTERY BYPASS ENDO VEIN HARVEST X2;  Surgeon: Kiel Ash M.D.;  Location: SURGERY [facility] [place];  Service: Cardiothoracic   • JIM  12/22/2017    Procedure: JIM;  Surgeon: Kiel Ash M.D.;  Location: SURGERY [facility] [place];  Service: Cardiothoracic   • OTHER ABDOMINAL SURGERY          ALLERGIES:Cillins [penicillins]; Erythromycin; Shellfish allergy; and Metoprolol     SocHx:   Social History     Socioeconomic History   • Marital status:      Spouse name: Not on file   • Number of children: Not on file   • Years of education: Not on file   • Highest education level: Not on file   Occupational History   • Not on file   Social Needs   • Financial resource strain: Not on file   • Food insecurity:     Worry: Not on file     Inability: Not on file   • Transportation needs:     Medical: Not on file     Non-medical: Not on file   Tobacco Use   • Smoking status: Light Tobacco Smoker     Packs/day: 0.00     Years: 30.00     Pack years: 0.00     Types: Cigars, Cigarettes   • Smokeless tobacco: Never Used   • Tobacco comment: one cigarette a day   Substance and Sexual Activity   • Alcohol use:  No   • Drug use: No   • Sexual activity: Not on file   Lifestyle   • Physical activity:     Days per week: Not on file     Minutes per session: Not on file   • Stress: Not on file   Relationships   • Social connections:     Talks on phone: Not on file     Gets together: Not on file     Attends Mosque service: Not on file     Active member of club or organization: Not on file     Attends meetings of clubs or organizations: Not on file     Relationship status: Not on file   • Intimate partner violence:     Fear of current or ex partner: Not on file     Emotionally abused: Not on file     Physically abused: Not on file     Forced sexual activity: Not on file   Other Topics Concern   • Not on file   Social History Narrative   • Not on file        FAMHx:   Family History   Problem Relation Age of Onset   • Diabetes Mother    • Stroke Mother    • Leukemia Mother    • Diabetes Father    • No Known Problems Sister    • Heart Disease Brother         PPM   • Lung Disease Brother    • Alcohol/Drug Brother    • Diabetes Sister         ROS:  Constitutional: No fevers, chills, no night sweats, no weight changes  HEENT: no vision or hearing changes, no dry mouth, no change in smell  CARDIO: no palpitations but has history of atrial fibrillation, no orthopnea, no chest pain  PULM: history of asthma, on PRN oxygen at home, SOB with exertion  NEURO: no Seizures, no memory impairment, no change in sensation  GI: as above  : no dysuria, hematuria present in UA   HEME: no anemia, bruises fairly easily  MUSCULOSKELETAL: positive for back pain, leg pain. History of brown recluse bite RLE.   PSYCH: no anxiety or depression, positive for fearful of MRI  SKIN: no rashes     PE:  Vitals:    09/11/19 0230 09/11/19 0330 09/11/19 0812 09/11/19 1233   BP: 119/71 102/67 107/68 122/69   Pulse: 74 70 68 77   Resp:  16 18 18   Temp:  36.8 °C (98.2 °F) 35.9 °C (96.6 °F) 36.4 °C (97.5 °F)   TempSrc:  Temporal Temporal Temporal   SpO2: 100% 97% 94%  94%   Weight:  (!) 126.6 kg (279 lb 1.6 oz)     Height:         Gen: AAOx3, NAD, lying in bed  HEENT: PERRL, EOMI, nares patent, Mucous membranes slightly dry, no scleral icterus, conjunctiva wnl  Neck: supple, no cervical or supraclavicular adenopathy  CVS: regular rhythm, normal rate, murmur present, no gallops or rubs  Pulm: decreased breath sounds throughout anteriorly  Abd: soft, hypoactive BS all quadrants, tenderness with palpation of LUQ with rebound tenderness, area of swelling lateral to umbilicus approx 6cm diameter which is soft, mildly tender with palpation. No ecchymosis or erythema noted of skin on abdoman.   Ext: +2 LE edema and dark blue/brown discoloration bilaterally  NEURO: grossly normal, no weakness  Skin: warm, no rash  Psych: normal Affect, no anxiety     LABS:  Lab Results   Component Value Date/Time    SODIUM 134 (L) 09/11/2019 06:47 AM    POTASSIUM 3.7 09/11/2019 06:47 AM    CHLORIDE 99 09/11/2019 06:47 AM    CO2 24 09/11/2019 06:47 AM    GLUCOSE 128 (H) 09/11/2019 06:47 AM    BUN 20 09/11/2019 06:47 AM    CREATININE 1.20 09/11/2019 06:47 AM      Lab Results   Component Value Date/Time    WBC 19.6 (H) 09/11/2019 06:47 AM    RBC 6.79 (H) 09/11/2019 06:47 AM    HEMOGLOBIN 16.8 09/11/2019 06:47 AM    HEMATOCRIT 55.8 (H) 09/11/2019 06:47 AM    MCV 82.2 09/11/2019 06:47 AM    MCH 24.7 (L) 09/11/2019 06:47 AM    MCHC 30.1 (L) 09/11/2019 06:47 AM    MPV 9.2 09/11/2019 06:47 AM    NEUTSPOLYS 87.10 (H) 09/11/2019 06:47 AM    LYMPHOCYTES 3.20 (L) 09/11/2019 06:47 AM    MONOCYTES 5.60 09/11/2019 06:47 AM    EOSINOPHILS 1.80 09/11/2019 06:47 AM    EOSINOPHILS 2 11/07/2018 09:34 AM    BASOPHILS 0.80 09/11/2019 06:47 AM    HYPOCHROMIA 1+ 09/10/2019 08:09 PM    ANISOCYTOSIS 2+ 09/10/2019 08:09 PM        Lab Results   Component Value Date/Time    PROTHROMBTM 74.5 () 09/11/2019 06:47 AM    INR 8.53 () 09/11/2019 06:47 AM      Recent Labs     09/10/19  2009 09/11/19  0647   ASTSGOT 12 10*   ALTSGPT 9 8    TBILIRUBIN 0.8 0.6   GLOBULIN 4.9* 4.2*   INR 7.07* 8.53*          Problem List Items Addressed This Visit     Pancreatic lesion     Newly found ,multiloculated cystic mass in pancreas in the setting of elevated lipase, could be the cause of the symptoms  See A&P of abdominal pain.  Palpable mass+  Elevated lipase    Differentials include:   -Pancreatic hematoma due to warfarin   -Pancreatic cystic neoplasms. Consider fine needle aspiration.   -Incidental. 2% cases. Usually asymptomatic but may cause abdominal pain   -Necrotizing pancreatitis or pancreatic pseudocyst   -Benign pancreatic cysts.    Plan:  -MRI abdomen for evaluation of pancreatic mass.  -Consulted general surgery appreciate recommendations.  -Consulted GI appreciate recommendations.  -Consider IR consultation if MRI shows cyst or hematoma.         Supratherapeutic INR     Pt on warfarin for paroxysmal atrial fibrillation, with supratherapeutic INR of 7.07 on admission    Plan:  -Hold warfarin  -vit-K 2.5 mg + 5mg  -CTM with frequent PT/INR  -Assess the patient for active bleeding.  -Monitor Hb daily.           Other Visit Diagnoses     Acute pancreatitis, unspecified complication status, unspecified pancreatitis type        Relevant Medications    HYDROmorphone pf (DILAUDID) injection 0.5 mg (Completed)    HYDROmorphone pf (DILAUDID) injection 0.5 mg    Sepsis, due to unspecified organism (HCC)               ASSESSMENT: 53 yr old male with 3-4 day history of LUQ abdominal pain, nausea and diarrhea. Vomiting x 1. Admitted through the ER yesterday. INR 8.53, on coumadin for atrial fibrillation and history of CABG. No history of pancreatitis, cholecysitis, liver issues. No alcohol or recreational drugs. He may have sustained a contusion to LUQ with fall in late July.  Per patient INR  has been more difficult to control this summer. Recently hospitalized for hematoma of left calf.   CT abdomen/pelvis shows pancreatitis, pancreatic lobulated cyst which  may represent hematoma, cystic fluid or mass. MRI pancreas scheduled for later today to evaluate further.   If mass, location of mass will determine if possible to biopsy. If lobulation seen, draining of cyst is not recommended. If without lobulation, generally recommended to drain if greater than 6cm. For any procedure, INR will need to be corrected prior to procedure.       PLAN:   1. Awaiting results of MRI of pancreas  2. NPO for now  3. Recommend incentive spirometer q 1 hour while awake  4. Monitor Hgb and INR daily. Patient received Vit K 2.5mg today. Followed by hospitalist team  5. Will continue to follow. Please call with questions and concerns.[phone]      Thank you for this consult.

## 2019-09-12 ENCOUNTER — APPOINTMENT (OUTPATIENT)
Dept: RADIOLOGY | Facility: MEDICAL CENTER | Age: 54
DRG: 439 | End: 2019-09-12
Attending: STUDENT IN AN ORGANIZED HEALTH CARE EDUCATION/TRAINING PROGRAM
Payer: MEDICARE

## 2019-09-12 PROBLEM — K85.90 ACUTE PANCREATITIS: Status: ACTIVE | Noted: 2019-09-11

## 2019-09-12 LAB
ANION GAP SERPL CALC-SCNC: 10 MMOL/L (ref 0–11.9)
BASOPHILS # BLD AUTO: 0.7 % (ref 0–1.8)
BASOPHILS # BLD: 0.13 K/UL (ref 0–0.12)
BUN SERPL-MCNC: 17 MG/DL (ref 8–22)
CALCIUM SERPL-MCNC: 9.1 MG/DL (ref 8.5–10.5)
CHLORIDE SERPL-SCNC: 97 MMOL/L (ref 96–112)
CO2 SERPL-SCNC: 24 MMOL/L (ref 20–33)
CREAT SERPL-MCNC: 1.05 MG/DL (ref 0.5–1.4)
EOSINOPHIL # BLD AUTO: 0.34 K/UL (ref 0–0.51)
EOSINOPHIL NFR BLD: 1.8 % (ref 0–6.9)
ERYTHROCYTE [DISTWIDTH] IN BLOOD BY AUTOMATED COUNT: 78 FL (ref 35.9–50)
GLUCOSE SERPL-MCNC: 139 MG/DL (ref 65–99)
HCT VFR BLD AUTO: 54.9 % (ref 42–52)
HGB BLD-MCNC: 16.7 G/DL (ref 14–18)
IMM GRANULOCYTES # BLD AUTO: 0.17 K/UL (ref 0–0.11)
IMM GRANULOCYTES NFR BLD AUTO: 0.9 % (ref 0–0.9)
INR PPP: 3.42 (ref 0.87–1.13)
LIPASE SERPL-CCNC: 546 U/L (ref 11–82)
LYMPHOCYTES # BLD AUTO: 0.68 K/UL (ref 1–4.8)
LYMPHOCYTES NFR BLD: 3.7 % (ref 22–41)
MAGNESIUM SERPL-MCNC: 1.5 MG/DL (ref 1.5–2.5)
MCH RBC QN AUTO: 24.9 PG (ref 27–33)
MCHC RBC AUTO-ENTMCNC: 30.4 G/DL (ref 33.7–35.3)
MCV RBC AUTO: 81.9 FL (ref 81.4–97.8)
MONOCYTES # BLD AUTO: 0.94 K/UL (ref 0–0.85)
MONOCYTES NFR BLD AUTO: 5.1 % (ref 0–13.4)
NEUTROPHILS # BLD AUTO: 16.14 K/UL (ref 1.82–7.42)
NEUTROPHILS NFR BLD: 87.8 % (ref 44–72)
NRBC # BLD AUTO: 0 K/UL
NRBC BLD-RTO: 0 /100 WBC
PLATELET # BLD AUTO: 305 K/UL (ref 164–446)
PMV BLD AUTO: 9.4 FL (ref 9–12.9)
POTASSIUM SERPL-SCNC: 3.9 MMOL/L (ref 3.6–5.5)
PROTHROMBIN TIME: 35.9 SEC (ref 12–14.6)
RBC # BLD AUTO: 6.7 M/UL (ref 4.7–6.1)
SODIUM SERPL-SCNC: 131 MMOL/L (ref 135–145)
WBC # BLD AUTO: 18.4 K/UL (ref 4.8–10.8)

## 2019-09-12 PROCEDURE — 700102 HCHG RX REV CODE 250 W/ 637 OVERRIDE(OP): Performed by: STUDENT IN AN ORGANIZED HEALTH CARE EDUCATION/TRAINING PROGRAM

## 2019-09-12 PROCEDURE — 85610 PROTHROMBIN TIME: CPT

## 2019-09-12 PROCEDURE — 80048 BASIC METABOLIC PNL TOTAL CA: CPT

## 2019-09-12 PROCEDURE — 85025 COMPLETE CBC W/AUTO DIFF WBC: CPT

## 2019-09-12 PROCEDURE — 700101 HCHG RX REV CODE 250: Performed by: HOSPITALIST

## 2019-09-12 PROCEDURE — 83735 ASSAY OF MAGNESIUM: CPT

## 2019-09-12 PROCEDURE — 83690 ASSAY OF LIPASE: CPT

## 2019-09-12 PROCEDURE — 94640 AIRWAY INHALATION TREATMENT: CPT

## 2019-09-12 PROCEDURE — 700117 HCHG RX CONTRAST REV CODE 255: Performed by: STUDENT IN AN ORGANIZED HEALTH CARE EDUCATION/TRAINING PROGRAM

## 2019-09-12 PROCEDURE — 74170 CT ABD WO CNTRST FLWD CNTRST: CPT

## 2019-09-12 PROCEDURE — 700105 HCHG RX REV CODE 258: Performed by: STUDENT IN AN ORGANIZED HEALTH CARE EDUCATION/TRAINING PROGRAM

## 2019-09-12 PROCEDURE — 94760 N-INVAS EAR/PLS OXIMETRY 1: CPT

## 2019-09-12 PROCEDURE — 99232 SBSQ HOSP IP/OBS MODERATE 35: CPT | Mod: GC | Performed by: HOSPITALIST

## 2019-09-12 PROCEDURE — 94664 DEMO&/EVAL PT USE INHALER: CPT

## 2019-09-12 PROCEDURE — 700111 HCHG RX REV CODE 636 W/ 250 OVERRIDE (IP): Performed by: STUDENT IN AN ORGANIZED HEALTH CARE EDUCATION/TRAINING PROGRAM

## 2019-09-12 PROCEDURE — 770020 HCHG ROOM/CARE - TELE (206)

## 2019-09-12 PROCEDURE — 36415 COLL VENOUS BLD VENIPUNCTURE: CPT

## 2019-09-12 PROCEDURE — A9270 NON-COVERED ITEM OR SERVICE: HCPCS | Performed by: STUDENT IN AN ORGANIZED HEALTH CARE EDUCATION/TRAINING PROGRAM

## 2019-09-12 RX ORDER — AMOXICILLIN 250 MG
2 CAPSULE ORAL 2 TIMES DAILY PRN
Status: DISCONTINUED | OUTPATIENT
Start: 2019-09-12 | End: 2019-09-13 | Stop reason: HOSPADM

## 2019-09-12 RX ORDER — SODIUM CHLORIDE 9 MG/ML
INJECTION, SOLUTION INTRAVENOUS CONTINUOUS
Status: DISCONTINUED | OUTPATIENT
Start: 2019-09-12 | End: 2019-09-13

## 2019-09-12 RX ORDER — POLYETHYLENE GLYCOL 3350 17 G/17G
1 POWDER, FOR SOLUTION ORAL
Status: DISCONTINUED | OUTPATIENT
Start: 2019-09-12 | End: 2019-09-13 | Stop reason: HOSPADM

## 2019-09-12 RX ORDER — MAGNESIUM SULFATE HEPTAHYDRATE 40 MG/ML
4 INJECTION, SOLUTION INTRAVENOUS ONCE
Status: COMPLETED | OUTPATIENT
Start: 2019-09-12 | End: 2019-09-12

## 2019-09-12 RX ORDER — HYDROMORPHONE HYDROCHLORIDE 1 MG/ML
0.5 INJECTION, SOLUTION INTRAMUSCULAR; INTRAVENOUS; SUBCUTANEOUS
Status: DISCONTINUED | OUTPATIENT
Start: 2019-09-12 | End: 2019-09-13 | Stop reason: HOSPADM

## 2019-09-12 RX ORDER — BISACODYL 10 MG
10 SUPPOSITORY, RECTAL RECTAL
Status: DISCONTINUED | OUTPATIENT
Start: 2019-09-12 | End: 2019-09-13 | Stop reason: HOSPADM

## 2019-09-12 RX ADMIN — IPRATROPIUM BROMIDE AND ALBUTEROL SULFATE 3 ML: .5; 3 SOLUTION RESPIRATORY (INHALATION) at 18:05

## 2019-09-12 RX ADMIN — FUROSEMIDE 40 MG: 40 TABLET ORAL at 05:59

## 2019-09-12 RX ADMIN — METOPROLOL SUCCINATE 25 MG: 25 TABLET, EXTENDED RELEASE ORAL at 05:59

## 2019-09-12 RX ADMIN — HYDROMORPHONE HYDROCHLORIDE 0.5 MG: 1 INJECTION, SOLUTION INTRAMUSCULAR; INTRAVENOUS; SUBCUTANEOUS at 22:04

## 2019-09-12 RX ADMIN — HYDROMORPHONE HYDROCHLORIDE 0.5 MG: 1 INJECTION, SOLUTION INTRAMUSCULAR; INTRAVENOUS; SUBCUTANEOUS at 17:09

## 2019-09-12 RX ADMIN — SPIRONOLACTONE 25 MG: 25 TABLET ORAL at 06:00

## 2019-09-12 RX ADMIN — AMIODARONE HYDROCHLORIDE 200 MG: 200 TABLET ORAL at 05:59

## 2019-09-12 RX ADMIN — TIOTROPIUM BROMIDE 1 CAPSULE: 18 CAPSULE ORAL; RESPIRATORY (INHALATION) at 06:00

## 2019-09-12 RX ADMIN — IPRATROPIUM BROMIDE AND ALBUTEROL SULFATE 3 ML: .5; 3 SOLUTION RESPIRATORY (INHALATION) at 11:05

## 2019-09-12 RX ADMIN — IPRATROPIUM BROMIDE AND ALBUTEROL SULFATE 3 ML: .5; 3 SOLUTION RESPIRATORY (INHALATION) at 08:04

## 2019-09-12 RX ADMIN — IPRATROPIUM BROMIDE AND ALBUTEROL SULFATE 3 ML: .5; 3 SOLUTION RESPIRATORY (INHALATION) at 15:20

## 2019-09-12 RX ADMIN — HYDROMORPHONE HYDROCHLORIDE 0.5 MG: 1 INJECTION, SOLUTION INTRAMUSCULAR; INTRAVENOUS; SUBCUTANEOUS at 03:02

## 2019-09-12 RX ADMIN — HYDROMORPHONE HYDROCHLORIDE 0.5 MG: 1 INJECTION, SOLUTION INTRAMUSCULAR; INTRAVENOUS; SUBCUTANEOUS at 13:36

## 2019-09-12 RX ADMIN — ROSUVASTATIN CALCIUM 40 MG: 20 TABLET, FILM COATED ORAL at 05:59

## 2019-09-12 RX ADMIN — MAGNESIUM SULFATE IN WATER 4 G: 40 INJECTION, SOLUTION INTRAVENOUS at 15:26

## 2019-09-12 RX ADMIN — SODIUM CHLORIDE: 9 INJECTION, SOLUTION INTRAVENOUS at 10:19

## 2019-09-12 RX ADMIN — ALBUTEROL SULFATE 2 PUFF: 90 AEROSOL, METERED RESPIRATORY (INHALATION) at 02:53

## 2019-09-12 RX ADMIN — IOHEXOL 100 ML: 350 INJECTION, SOLUTION INTRAVENOUS at 12:26

## 2019-09-12 ASSESSMENT — ENCOUNTER SYMPTOMS
PALPITATIONS: 0
SPUTUM PRODUCTION: 0
DIARRHEA: 1
SORE THROAT: 0
CHILLS: 0
SHORTNESS OF BREATH: 1
ORTHOPNEA: 0
WEAKNESS: 0
LOSS OF CONSCIOUSNESS: 0
DIZZINESS: 0
MEMORY LOSS: 0
SENSORY CHANGE: 0
ABDOMINAL PAIN: 1
WHEEZING: 0
CONSTIPATION: 0
SHORTNESS OF BREATH: 0
DOUBLE VISION: 0
FLANK PAIN: 0
VOMITING: 0
HEARTBURN: 0
NAUSEA: 0
COUGH: 0
MYALGIAS: 0
TINGLING: 0
PND: 0
NAUSEA: 1
DEPRESSION: 0
BLURRED VISION: 0
BLOOD IN STOOL: 0
FEVER: 0
HEADACHES: 0

## 2019-09-12 NOTE — CARE PLAN
Problem: Communication  Goal: The ability to communicate needs accurately and effectively will improve  Outcome: PROGRESSING AS EXPECTED   POC reviewed and all questions answered. Pt verbalized understanding of treatment and medications. Calls appropriately with needs and asks questions regarding care.     Problem: Safety  Goal: Will remain free from injury  Outcome: PROGRESSING AS EXPECTED   Pt oriented to call light system and educated to call for assistance. Non slip socks on. Fall precautions in place. Verbalized understanding of safety measures. Bed in locked and low position, call light in reach.

## 2019-09-12 NOTE — PROGRESS NOTES
Gastroenterology Progress Note     Author: Cesar Esquivel   Date & Time Created: 9/12/2019 4:55 PM    Chief Complaint:  Abdominal pain, pancreatic lesion    Interval History:  Patient reports that he has some pain today but improved. He was unable to get MRI due to patient's size. CT pancreatic protocol shows 3.9cm loculated fluid collection near the pancreatic head. Most likely this represents a pseudocyst    Review of Systems:  Review of Systems   Constitutional: Negative for chills and fever.   Respiratory: Negative for shortness of breath.    Cardiovascular: Negative for chest pain.   Gastrointestinal: Positive for abdominal pain. Negative for blood in stool, heartburn, melena, nausea and vomiting.       Physical Exam:  Physical Exam   Constitutional: No distress.   Cardiovascular: Normal rate and regular rhythm.   Abdominal: Soft. Bowel sounds are normal. He exhibits no distension. There is no tenderness.       Labs:          Recent Labs     09/10/19  2009 09/11/19  0647 09/12/19  1054   SODIUM 134* 134* 131*   POTASSIUM 4.5 3.7 3.9   CHLORIDE 96 99 97   CO2 26 24 24   BUN 19 20 17   CREATININE 1.25 1.20 1.05   MAGNESIUM  --   --  1.5   CALCIUM 9.5 8.7 9.1     Recent Labs     09/10/19  2009 09/11/19  0647 09/12/19  1054   ALTSGPT 9 8  --    ASTSGOT 12 10*  --    ALKPHOSPHAT 164* 142*  --    TBILIRUBIN 0.8 0.6  --    LIPASE 586*  --  546*   GLUCOSE 139* 128* 139*     Recent Labs     09/10/19  2009 09/11/19  0647 09/11/19  1240 09/11/19  1859 09/12/19  1054   RBC 7.29* 6.79*  --   --  6.70*   HEMOGLOBIN 18.2* 16.8  --  17.5 16.7   HEMATOCRIT 59.9* 55.8*  --  56.2* 54.9*   PLATELETCT 371 299  --   --  305   PROTHROMBTM 64.0* 74.5* 79.7*  --  35.9*   APTT 83.1*  --   --   --   --    INR 7.07* 8.53* 9.30*  --  3.42*     Recent Labs     09/10/19  2009 09/11/19  0647 09/12/19  1054   WBC 24.4* 19.6* 18.4*   NEUTSPOLYS 87.90* 87.10* 87.80*   LYMPHOCYTES 3.10* 3.20* 3.70*   MONOCYTES 5.30 5.60 5.10   EOSINOPHILS  1.40 1.80 1.80   BASOPHILS 0.90 0.80 0.70   ASTSGOT 12 10*  --    ALTSGPT 9 8  --    ALKPHOSPHAT 164* 142*  --    TBILIRUBIN 0.8 0.6  --      Hemodynamics:  Temp (24hrs), Av.3 °C (97.3 °F), Min:36 °C (96.8 °F), Max:36.7 °C (98 °F)  Temperature: 36.1 °C (97 °F)  Pulse  Av  Min: 66  Max: 79   Blood Pressure: 139/72     Respiratory:    Respiration: 16, Pulse Oximetry: 94 %, O2 Daily Delivery Respiratory : Silicone Nasal Cannula     Given By:: Mouthpiece, Work Of Breathing / Effort: Mild  RUL Breath Sounds: Clear, RML Breath Sounds: Diminished, RLL Breath Sounds: Diminished, GENNARO Breath Sounds: Clear, LLL Breath Sounds: Diminished  Fluids:    Intake/Output Summary (Last 24 hours) at 2019 1655  Last data filed at 2019 1641  Gross per 24 hour   Intake 1463 ml   Output 1700 ml   Net -237 ml       GI/Nutrition:  Orders Placed This Encounter   Procedures   • Diet Order Low Fiber(GI Soft)     Standing Status:   Standing     Number of Occurrences:   1     Order Specific Question:   Diet:     Answer:   Low Fiber(GI Soft) [2]     Order Specific Question:   Macronutrient modifications:     Answer:   Low Fat [5]     Comments:   low residue      Medical Decision Making, by Problem:  Active Hospital Problems    Diagnosis   • Acute pancreatitis [K85.90]   • Pancreatic lesion [K86.9]   • Supratherapeutic INR [R79.1]   • Paroxysmal atrial fibrillation (HCC) [I48.0]   • Abdominal hernia [K46.9]   • Hematuria [R31.9]   • Shortness of breath [R06.02]   • Leukocytosis [D72.829]       Plan:  Pancreatitis with new pancreatic fluid collection. CT pancreatic protocol likely is consistent with pseudocyst. He needs EUS for full eval. This would need to be done in 6 weeks. Patient currently saying that he does not want to have this done. I would like to see him back in clinic to discuss this procedure and his desire to have full evaluation. From the GI perspective, he can likely be discharged home on a low fat  diet.    Quality-Core Measures

## 2019-09-12 NOTE — PROGRESS NOTES
Bedside report received, pt care assumed, tele box on. VSS, pt assessment complete. Pt aaox4, no signs of distress noted at this time. POC discussed with pt and verbalizes no questions. Pt denies any additional needs at this time. Bed in lowest position, pt educated on fall risk and verbalized understanding, call light within reach.

## 2019-09-12 NOTE — CONSULTS
DATE OF SERVICE:  09/12/2019    SURGERY CONSULTATION    REASON FOR CONSULTATION:  Pancreatic cyst, abdominal pain.    HISTORY OF PRESENT ILLNESS:  Patient is a 53-year-old chronically ill male   with history of coronary artery disease, cardiomyopathy, and atrial   fibrillation.  The patient underwent coronary artery bypass graft in 2017.    Most recent echocardiogram showed dilated cardiomyopathy with an ejection   fraction of 25%.  He has had multiple admissions for bleeding likely secondary   to supratherapeutic status, is on Coumadin.  He had a GI bleed in 2018,   etiology suspected to be diverticular disease, recent admission with left   lower extremity hematoma.  He has also been treated for a left hemothorax.  He   underwent VATS procedure with decortication in 2018, by Dr. Paez.  His   chief complaint has been epigastric pain.  CT findings showed a multiloculated   4x2.5 cm cystic lesion in the body of the pancreas.  He also clinically has   pancreatitis.  GI was consulted.  The patient was seen by a nurse   practitioner, recommended surgical consultation at this stage.  Currently,   patient denies abdominal pain.  He is tolerating clear diet.  Denies melena,   dark tarry stools, or hematemesis.  He was given FFP and vitamin K to treat.    Last INR of 9.    REVIEW OF SYSTEMS:  Positive for recent abdominal pain and shortness of   breath.  All other review of systems on a 10-point review of systems according   to the AMA guidelines are negative except for that stated in the HPI.    PAST MEDICAL HISTORY:  Coronary artery disease, COPD, congestive heart   failure, diabetes, atrial fibrillation, and asthma.    PAST SURGICAL HISTORY:  The patient has had VATS procedure in 2018 with   decortication, coronary artery bypass graft 2017, JIM 2017, colonoscopy 2018.    SOCIAL HISTORY:  A 30-pack-year smoking history.  Patient denies alcohol or   illicit drugs.    FAMILY HISTORY:  The patient has a family history of  pancreatic cancer, brother diagnosed at the age of 65.  No family history of colon cancer.  Colonoscopy,   the patient had a normal colonoscopy in 2018.    ALLERGIES:  TO PENICILLIN, ERYTHROMYCIN, SHELLFISH, METOPROLOL.    PHYSICAL EXAMINATION:  VITAL SIGNS:  The patient's T-max of 97.3, pulse 60s-70s, blood pressure   103/72, sats are 94% on 2 liters nasal cannula.  CONSTITUTIONAL:  The patient is alert and oriented x3.  He is sitting on the   edge of the bed, no apparent distress, nontoxic, nondiaphoretic.  HEENT:  Pupils equal, round, and reactive to light.  No scleral icterus.    Mucous membranes are moist.  NECK:  No JVD.  No cervical lymphadenopathy.  CARDIOVASCULAR:  Regular rate and rhythm.  EXTREMITIES:  Warm.  The patient has 2+ pitting bilateral lower extremity   edema.  PULMONARY:  Expiratory wheezes, no stridor, no rhonchi.  Normal respiratory   effort.  ABDOMEN:  Obese, soft, nontender, nondistended.  He has a soft, nontender,   reducible ventral hernia.  No overlying skin changes.  NEUROLOGIC:  Cranial nerves II-XII grossly intact.  Normal sensation and   strength in bilateral upper and lower extremities.  SKIN:  Warm.  No petechiae.  No rashes.  He does have ecchymosis around his IV   sites in various stages of resolution.    LABORATORY DATA:  White count of 19, hemoglobin 16, hematocrit 55.  PT 74.    INR 8.5.  Sodium 134, potassium 3.7, chloride 99, bicarbonate 24, BUN 20,   creatinine 1.2, AST 10, ALT 8, total bilirubin 0.6.    IMAGING:  CT abdomen and pelvis showed a 4x2.5 cm complex cystic lesion in the   pancreatic body.  There is stranding around the pancreas, small foci of   calcification.  Enlarged spleen 16.5 cm in greatest dimension, diverticula.    There is a fat-containing supraumbilical hernia and hepatosteatosis.    ASSESSMENT AND PLAN:  This is a 53-year-old male, multiple comorbidities,   recent admission for spontaneous bleeding in lower extremities secondary to   being  supratherapeutic on his Coumadin.  He also had a complaint of abdominal   pain.  CT findings and laboratory evaluation consistent with pancreatitis.    Nonspecific cystic lesion in the pancreas identified.  I discussed the patient   with Dr. Montgomery, hepatobiliary surgery specialist.  Recommendation at this   time is to continue medical management for pancreatitis and defer to   gastroenterology for further workup.  Consider EUS as imaging at this time   would not be helpful given the acute pancreatitis.  Recommend a followup   imaging in 4-6 weeks.  No indication for surgical intervention at this time.    Evaluation for the pancreatic cystic lesion should take place in the   outpatient setting along with follow up with Dr. Montgomery.       ____________________________________     MD MIMI Deluna / YOSVANY    DD:  09/12/2019 11:21:05  DT:  09/12/2019 14:12:17    D#:  [id number]  Job#:  [id number]

## 2019-09-12 NOTE — PROGRESS NOTES
Bedside report received, patient care assumed. Pt is A&Ox4, VSS, assessment complete. Tele box in place. C/o abdominal pain at this time, medicated per MAR. POC reviewed and questions answered. Bed in locked and low position, fall precautions in place. Call light in reach. Educated to call for assistance.

## 2019-09-12 NOTE — CARE PLAN
Problem: Communication  Goal: The ability to communicate needs accurately and effectively will improve  Outcome: PROGRESSING AS EXPECTED  Intervention: [place] patient and significant other/support system to call light to alert staff of needs  Flowsheets (Taken 9/12/2019 1111)  Oriented to:: All of the Following : Location of Bathroom, Visiting Policy, Unit Routine, Call Light and Bedside Controls, Bedside Rail Policy, Smoking Policy, Rights and Responsibilities, Bedside Report, and Patient Education Notebook     Problem: Safety  Goal: Will remain free from falls  Outcome: PROGRESSING AS EXPECTED  Note:   Fall risk assessed, fall precautions in place, pt verbalizes understanding of fall risk.

## 2019-09-12 NOTE — PROGRESS NOTES
Pt came back from MRI, back on Tele monitoring. [facility] yellow notified that pt could not fit in MRI machine.

## 2019-09-12 NOTE — PROGRESS NOTES
Internal Medicine Interval Note  Note Author: Cynthia Perales M.D.     Name Joshua Medrano     1965   Age/Sex 53 y.o. male   MRN [id number]   Code Status Full     After 5PM or if no immediate response to page, please call for cross-coverage  Attending/Team: Dr Velasquez / Camilo See Patient List for primary contact information  Call [phone] to page    1st Call - Day Intern (R1):   Dr Thorne 2nd Call - Day Sr. Resident (R2/R3):   Dr Perales         Reason for interval visit  (Principal Problem)   Abdominal pain  Pancreatic lesion  Abdominal wall hernia    Interval Problem Daily Status Update  (24 hours, problem oriented, brief subjective history, new lab/imaging data pertinent to that problem)   Patient with a much better mood this morning, he felt that he got a good night sleep last night.  Minimal abdominal pain.  Was able to tolerate clear liquid diet without difficulty.  INR still supratherapeutic however has improved.  No overt bleeding.  Lipase downtrending.  WBC is downtrending.  CT of the pancreas showed a loculated mass favoring pancreatic pseudocyst however malignancy not excluded.  Not a surgical candidate at this point as the mass has been uncharacterized and would need further evaluation likely as outpatient.  EUS currently may be unhelpful due to the acute pancreatitis.    Review of Systems   Constitutional: Negative for chills and fever.   HENT: Negative for hearing loss and sore throat.    Eyes: Negative for blurred vision and double vision.   Respiratory: Positive for shortness of breath. Negative for cough, sputum production and wheezing.    Cardiovascular: Positive for leg swelling. Negative for chest pain, palpitations, orthopnea and PND.   Gastrointestinal: Positive for abdominal pain, diarrhea and nausea. Negative for blood in stool, constipation, heartburn, melena and vomiting.   Genitourinary: Negative for dysuria, flank pain, frequency, hematuria and urgency.    Musculoskeletal: Negative for joint pain and myalgias.   Skin: Negative for rash.   Neurological: Negative for dizziness, tingling, sensory change, loss of consciousness, weakness and headaches.   Psychiatric/Behavioral: Negative for depression and memory loss.     Disposition/Barriers to discharge:   None    Consultants/Specialty  GI -> CHENTE Maki  Surgery: Dr. Kinjal Girard  PCP: Nam Le M.D.    Quality Measures  Quality-Core Measures   Ceballos catheter::  No Ceballos  DVT prophylaxis pharmacological::  Contraindicated - High bleeding risk  DVT prophylaxis - mechanical:  SCDs    Physical Exam       Vitals:    09/12/19 0808 09/12/19 1105 09/12/19 1222 09/12/19 1520   BP: 103/62  139/72    Pulse: 66 70 74 78   Resp: 18 16 18 16   Temp: 36.3 °C (97.3 °F)  36.1 °C (97 °F)    TempSrc: Temporal  Temporal    SpO2: 93% 94% 95% 94%   Weight:       Height:         Body mass index is 33.1 kg/m².   Oxygen Therapy:  Pulse Oximetry: 94 %, O2 (LPM): 2, FiO2%: 36 %, O2 Delivery: None (Room Air)    Physical Exam   Constitutional: He is oriented to person, place, and time and well-developed, well-nourished, and in no distress. No distress.   HENT:   Head: Normocephalic and atraumatic.   Mouth/Throat: No oropharyngeal exudate.   Eyes: Pupils are equal, round, and reactive to light. Conjunctivae and EOM are normal. Right eye exhibits no discharge. No scleral icterus.   Neck: Normal range of motion. No JVD present. No tracheal deviation present.   Cardiovascular: Normal rate, regular rhythm and intact distal pulses. Exam reveals no gallop and no friction rub.   Murmur (Systolic, 3/6) heard.  Pulmonary/Chest: Effort normal and breath sounds normal. No stridor. No respiratory distress. He has no wheezes. He has no rales. He exhibits no tenderness.   Abdominal: He exhibits no distension and no mass. Bowel sounds are hypoactive. There is tenderness in the epigastric area. There is no rebound and no guarding.        Musculoskeletal: He exhibits no edema, tenderness or deformity.        Legs:  Neurological: He is alert and oriented to person, place, and time. No cranial nerve deficit. Gait normal. Coordination normal.   Skin: No rash noted. He is not diaphoretic. No erythema. No pallor.   Psychiatric: Affect normal.   Vitals reviewed.      Assessment/Plan     Supratherapeutic INR  Assessment & Plan  INR goal: 2-3 for paroxysmal atrial fibrillation, nonvalvular  Previous episodes of supratherapeutic INR with bleed  Vitamin K and FFP given.    -Hold warfarin for now  -Monitor PT/INR    Pancreatic lesion  Assessment & Plan  Newly found ,multiloculated cystic mass in pancreas in the setting of elevated lipase  Pseudocyst versus hematoma versus neoplasm  CT pancreas done today showing: Multilocular cystic lesion in the pancreatic body which extends into the lesser sac with collection adjacent the medial margin of LEFT lobe liver.     -Appearance favoring pseudocyst however neoplasm still consideration  -No surgical indication at this point  -Would need further work-up to evaluate the lesion  -EUS at this time would not be helpful in the setting of acute pancreatitis  -GI following appreciate recommendations      Acute pancreatitis- (present on admission)  Assessment & Plan  Lipase 586 on admission, downtrending slightly to 546  Worsening abdominal pain with nausea and vomiting, new onset multiloculated cystic lesion in pancreatic body with elevated lipase levels.  Does not drink alcohol, no gallstones seen on CT scan, no hypertriglyceridemia    -Gentle IV fluids considering patient's previous history of flash pulmonary edema  -IV hydromorphone prn for pain  -Low residue, soft diet    Paroxysmal atrial fibrillation (HCC)  Assessment & Plan  CHADsVASC: 2 HASBLED: 2 -moderate risk of major bleeding  Rate controlled, rhythm controlled on amiodarone.  Anticoagulated with warfarin    -Continue metoprolol, amiodarone  -Hold warfarin  for now  -Consider initiation of DOAC for anticoagulation given patient's recent bleeds and labile INRs    Shortness of breath  Assessment & Plan  -h/o COPD, smoking,   -on inhalers and 2L oxygen NC at home at night.  -cxr-mild interstitial edema, no signs of pulmonary edema.  -Unlikely resp infection/pneumonia.    Plan:  -CTM his oxygen requirements  -If worsening, consider flash pulmonary edema.    Hematuria  Assessment & Plan  Gross hematuria with >100 RBC in UA  Likely secondary to supratherapeutic INR  -Follow-up as outpatient        Abdominal hernia  Assessment & Plan  Ventral abdominal hernia, reducible.  No signs of incarceration/obstruction on CT abdomen.    Leukocytosis- (present on admission)  Assessment & Plan  Pt received ceftriaxone in ED. Stopped antibiotics, no signs of infection.  Persistently elevated WBCs, however more elevated than from baseline.  Likely from the acute pancreatitis  WBC is downtrending    -Trend CBC

## 2019-09-12 NOTE — RESPIRATORY CARE
COPD EDUCATION by COPD CLINICAL EDUCATOR  9/12/2019 at 8:42 AM by Lambert Robles     Patient interviewed by COPD education team. Patient refused COPD program at this time.  And is a Non-smoker.

## 2019-09-13 ENCOUNTER — PATIENT OUTREACH (OUTPATIENT)
Dept: HEALTH INFORMATION MANAGEMENT | Facility: OTHER | Age: 54
End: 2019-09-13

## 2019-09-13 VITALS
HEART RATE: 62 BPM | WEIGHT: 278.22 LBS | BODY MASS INDEX: 32.85 KG/M2 | SYSTOLIC BLOOD PRESSURE: 109 MMHG | HEIGHT: 77 IN | OXYGEN SATURATION: 96 % | TEMPERATURE: 97 F | RESPIRATION RATE: 16 BRPM | DIASTOLIC BLOOD PRESSURE: 63 MMHG

## 2019-09-13 LAB
ALBUMIN SERPL BCP-MCNC: 3.2 G/DL (ref 3.2–4.9)
ALBUMIN/GLOB SERPL: 0.7 G/DL
ALP SERPL-CCNC: 136 U/L (ref 30–99)
ALT SERPL-CCNC: 7 U/L (ref 2–50)
ANION GAP SERPL CALC-SCNC: 11 MMOL/L (ref 0–11.9)
AST SERPL-CCNC: 9 U/L (ref 12–45)
BASOPHILS # BLD AUTO: 0.6 % (ref 0–1.8)
BASOPHILS # BLD: 0.11 K/UL (ref 0–0.12)
BILIRUB SERPL-MCNC: 0.7 MG/DL (ref 0.1–1.5)
BUN SERPL-MCNC: 14 MG/DL (ref 8–22)
CALCIUM SERPL-MCNC: 8.7 MG/DL (ref 8.5–10.5)
CHLORIDE SERPL-SCNC: 95 MMOL/L (ref 96–112)
CO2 SERPL-SCNC: 22 MMOL/L (ref 20–33)
CREAT SERPL-MCNC: 0.95 MG/DL (ref 0.5–1.4)
EOSINOPHIL # BLD AUTO: 0.35 K/UL (ref 0–0.51)
EOSINOPHIL NFR BLD: 2.1 % (ref 0–6.9)
ERYTHROCYTE [DISTWIDTH] IN BLOOD BY AUTOMATED COUNT: 79 FL (ref 35.9–50)
GLOBULIN SER CALC-MCNC: 4.4 G/DL (ref 1.9–3.5)
GLUCOSE SERPL-MCNC: 195 MG/DL (ref 65–99)
HCT VFR BLD AUTO: 54.8 % (ref 42–52)
HGB BLD-MCNC: 16.8 G/DL (ref 14–18)
IMM GRANULOCYTES # BLD AUTO: 0.27 K/UL (ref 0–0.11)
IMM GRANULOCYTES NFR BLD AUTO: 1.6 % (ref 0–0.9)
INR PPP: 1.67 (ref 0.87–1.13)
LYMPHOCYTES # BLD AUTO: 0.5 K/UL (ref 1–4.8)
LYMPHOCYTES NFR BLD: 2.9 % (ref 22–41)
MAGNESIUM SERPL-MCNC: 2 MG/DL (ref 1.5–2.5)
MCH RBC QN AUTO: 25.4 PG (ref 27–33)
MCHC RBC AUTO-ENTMCNC: 30.7 G/DL (ref 33.7–35.3)
MCV RBC AUTO: 82.9 FL (ref 81.4–97.8)
MONOCYTES # BLD AUTO: 0.98 K/UL (ref 0–0.85)
MONOCYTES NFR BLD AUTO: 5.8 % (ref 0–13.4)
NEUTROPHILS # BLD AUTO: 14.82 K/UL (ref 1.82–7.42)
NEUTROPHILS NFR BLD: 87 % (ref 44–72)
NRBC # BLD AUTO: 0 K/UL
NRBC BLD-RTO: 0 /100 WBC
PLATELET # BLD AUTO: 295 K/UL (ref 164–446)
PMV BLD AUTO: 10 FL (ref 9–12.9)
POTASSIUM SERPL-SCNC: 4 MMOL/L (ref 3.6–5.5)
PROT SERPL-MCNC: 7.6 G/DL (ref 6–8.2)
PROTHROMBIN TIME: 20.2 SEC (ref 12–14.6)
RBC # BLD AUTO: 6.61 M/UL (ref 4.7–6.1)
SODIUM SERPL-SCNC: 128 MMOL/L (ref 135–145)
WBC # BLD AUTO: 17 K/UL (ref 4.8–10.8)

## 2019-09-13 PROCEDURE — 700111 HCHG RX REV CODE 636 W/ 250 OVERRIDE (IP): Performed by: STUDENT IN AN ORGANIZED HEALTH CARE EDUCATION/TRAINING PROGRAM

## 2019-09-13 PROCEDURE — A9270 NON-COVERED ITEM OR SERVICE: HCPCS | Performed by: STUDENT IN AN ORGANIZED HEALTH CARE EDUCATION/TRAINING PROGRAM

## 2019-09-13 PROCEDURE — 94640 AIRWAY INHALATION TREATMENT: CPT

## 2019-09-13 PROCEDURE — 85025 COMPLETE CBC W/AUTO DIFF WBC: CPT

## 2019-09-13 PROCEDURE — 83735 ASSAY OF MAGNESIUM: CPT

## 2019-09-13 PROCEDURE — 80053 COMPREHEN METABOLIC PANEL: CPT

## 2019-09-13 PROCEDURE — 85610 PROTHROMBIN TIME: CPT

## 2019-09-13 PROCEDURE — 700102 HCHG RX REV CODE 250 W/ 637 OVERRIDE(OP): Performed by: STUDENT IN AN ORGANIZED HEALTH CARE EDUCATION/TRAINING PROGRAM

## 2019-09-13 PROCEDURE — 94760 N-INVAS EAR/PLS OXIMETRY 1: CPT

## 2019-09-13 PROCEDURE — 700101 HCHG RX REV CODE 250: Performed by: HOSPITALIST

## 2019-09-13 PROCEDURE — 99239 HOSP IP/OBS DSCHRG MGMT >30: CPT | Mod: GC | Performed by: HOSPITALIST

## 2019-09-13 PROCEDURE — 36415 COLL VENOUS BLD VENIPUNCTURE: CPT

## 2019-09-13 RX ORDER — ACETAMINOPHEN 325 MG/1
650 TABLET ORAL EVERY 6 HOURS PRN
Qty: 30 TAB | Refills: 0 | Status: ON HOLD | OUTPATIENT
Start: 2019-09-13 | End: 2020-11-24

## 2019-09-13 RX ADMIN — METOPROLOL SUCCINATE 25 MG: 25 TABLET, EXTENDED RELEASE ORAL at 06:06

## 2019-09-13 RX ADMIN — ROSUVASTATIN CALCIUM 40 MG: 20 TABLET, FILM COATED ORAL at 06:06

## 2019-09-13 RX ADMIN — IPRATROPIUM BROMIDE AND ALBUTEROL SULFATE 3 ML: .5; 3 SOLUTION RESPIRATORY (INHALATION) at 07:59

## 2019-09-13 RX ADMIN — HYDROMORPHONE HYDROCHLORIDE 0.5 MG: 1 INJECTION, SOLUTION INTRAMUSCULAR; INTRAVENOUS; SUBCUTANEOUS at 08:17

## 2019-09-13 RX ADMIN — HYDROMORPHONE HYDROCHLORIDE 0.5 MG: 1 INJECTION, SOLUTION INTRAMUSCULAR; INTRAVENOUS; SUBCUTANEOUS at 03:27

## 2019-09-13 RX ADMIN — AMIODARONE HYDROCHLORIDE 200 MG: 200 TABLET ORAL at 06:06

## 2019-09-13 RX ADMIN — IPRATROPIUM BROMIDE AND ALBUTEROL SULFATE 3 ML: .5; 3 SOLUTION RESPIRATORY (INHALATION) at 10:31

## 2019-09-13 RX ADMIN — SPIRONOLACTONE 25 MG: 25 TABLET ORAL at 06:06

## 2019-09-13 RX ADMIN — ALBUTEROL SULFATE 2 PUFF: 90 AEROSOL, METERED RESPIRATORY (INHALATION) at 00:31

## 2019-09-13 RX ADMIN — TIOTROPIUM BROMIDE 1 CAPSULE: 18 CAPSULE ORAL; RESPIRATORY (INHALATION) at 06:04

## 2019-09-13 RX ADMIN — ONDANSETRON 4 MG: 2 INJECTION INTRAMUSCULAR; INTRAVENOUS at 06:20

## 2019-09-13 ASSESSMENT — ENCOUNTER SYMPTOMS
FEVER: 0
SHORTNESS OF BREATH: 0
DOUBLE VISION: 0
DIARRHEA: 0
BLURRED VISION: 0
NAUSEA: 0
ABDOMINAL PAIN: 1
MYALGIAS: 0
CHILLS: 0
VOMITING: 0
CONSTIPATION: 0
HEADACHES: 0

## 2019-09-13 NOTE — DISCHARGE PLANNING
Care Transition Team Assessment    Information Source  Orientation : Oriented x 4  Information Given By: Patient  Who is responsible for making decisions for patient? : Patient         Elopement Risk  Legal Hold: No  Ambulatory or Self Mobile in Wheelchair: Yes  Disoriented: No  Psychiatric Symptoms: None  History of Wandering: No  Elopement this Admit: No  Vocalizing Wanting to Leave: No  Displays Behaviors, Body Language Wanting to Leave: No-Not at Risk for Elopement  Elopement Risk: Not at Risk for Elopement    Interdisciplinary Discharge Planning  Does Admitting Nurse Feel This Could be a Complex Discharge?: No  Lives with - Patient's Self Care Capacity: Spouse, Adult Children  Patient or legal guardian wants to designate a caregiver (see row info): No  Do You Take your Prescribed Medications Regularly: Yes  Able to Return to Previous ADL's: Yes  Mobility Issues: No  Durable Medical Equipment: Not Applicable    Discharge Preparedness  Prior Functional Level: Independent with Activities of Daily Living  Difficulity with ADLs: None  Difficulity with IADLs: None    Functional Assesment  Prior Functional Level: Independent with Activities of Daily Living    Finances  Financial Barriers to Discharge: No  Prescription Coverage: Yes    Vision / Hearing Impairment  Vision Impairment : No  Hearing Impairment : No         Advance Directive  Advance Directive?: None    Domestic Abuse  Have you ever been the victim of abuse or violence?: No  Physical Abuse or Sexual Abuse: No  Verbal Abuse or Emotional Abuse: No  Possible Abuse Reported to:: Not Applicable

## 2019-09-13 NOTE — CARE PLAN
Problem: Pain Management  Goal: Pain level will decrease to patient's comfort goal  Outcome: PROGRESSING AS EXPECTED   Discussed with patient best ways to manage pain, interventions in place. Given PRN pain medications as well as non-pharmacologic methods utilized. Working to reach manageable pain level to achieve maximum comfort. Pt verbalized understanding of pain management and treatment.    Problem: Infection  Goal: Will remain free from infection  Outcome: PROGRESSING AS EXPECTED   Pt educated on importance of hand hygiene and verbalized understanding. Hand washing performed before and after patient contact. No other s/sx of infection noted.

## 2019-09-13 NOTE — DISCHARGE INSTRUCTIONS
Discharge Instructions    Discharged to home by car with relative. Discharged via wheelchair, hospital escort: Yes.  Special equipment needed: Not Applicable    Be sure to schedule a follow-up appointment with your primary care doctor or any specialists as instructed.     Discharge Plan:   Diet Plan: Discussed  Activity Level: Discussed  Confirmed Follow up Appointment: Appointment Scheduled  Confirmed Symptoms Management: Discussed  Medication Reconciliation Updated: Yes  Influenza Vaccine Indication: Patient Refuses    I understand that a diet low in cholesterol, fat, and sodium is recommended for good health. Unless I have been given specific instructions below for another diet, I accept this instruction as my diet prescription.   Other diet: cardiac, low fat diet    Special Instructions: None    · Is patient discharged on Warfarin / Coumadin?   Yes    You are receiving the drug warfarin. Please understand the importance of monitoring warfarin with scheduled PT/INR blood draws.  Follow-up with the [facility] in one week for INR lab.    IMPORTANT: HOW TO USE THIS INFORMATION:  This is a summary and does NOT have all possible information about this product. This information does not assure that this product is safe, effective, or appropriate for you. This information is not individual medical advice and does not substitute for the advice of your health care professional. Always ask your health care professional for complete information about this product and your specific health needs.      WARFARIN - ORAL (WARF-uh-rin)      COMMON BRAND NAME(S): Coumadin      WARNING:  Warfarin can cause very serious (possibly fatal) bleeding. This is more likely to occur when you first start taking this medication or if you take too much warfarin. To decrease your risk for bleeding, your doctor or other health care provider will monitor you closely and check your lab results (INR test) to make sure you are not taking too  "much warfarin. Keep all medical and laboratory appointments. Tell your doctor right away if you notice any signs of serious bleeding. See also Side Effects section.      USES:  This medication is used to treat blood clots (such as in deep vein thrombosis-DVT or pulmonary embolus-PE) and/or to prevent new clots from forming in your body. Preventing harmful blood clots helps to reduce the risk of a stroke or heart attack. Conditions that increase your risk of developing blood clots include a certain type of irregular heart rhythm (atrial fibrillation), heart valve replacement, recent heart attack, and certain surgeries (such as hip/knee replacement). Warfarin is commonly called a \"blood thinner,\" but the more correct term is \"anticoagulant.\" It helps to keep blood flowing smoothly in your body by decreasing the amount of certain substances (clotting proteins) in your blood.      HOW TO USE:  Read the Medication Guide provided by your pharmacist before you start taking warfarin and each time you get a refill. If you have any questions, ask your doctor or pharmacist. Take this medication by mouth with or without food as directed by your doctor or other health care professional, usually once a day. It is very important to take it exactly as directed. Do not increase the dose, take it more frequently, or stop using it unless directed by your doctor. Dosage is based on your medical condition, laboratory tests (such as INR), and response to treatment. Your doctor or other health care provider will monitor you closely while you are taking this medication to determine the right dose for you. Use this medication regularly to get the most benefit from it. To help you remember, take it at the same time each day. It is important to eat a balanced, consistent diet while taking warfarin. Some foods can affect how warfarin works in your body and may affect your treatment and dose. Avoid sudden large increases or decreases in your " intake of foods high in vitamin K (such as broccoli, cauliflower, cabbage, brussels sprouts, kale, spinach, and other green leafy vegetables, liver, green tea, certain vitamin supplements). If you are trying to lose weight, check with your doctor before you try to go on a diet. Cranberry products may also affect how your warfarin works. Limit the amount of cranberry juice (16 ounces/480 milliliters a day) or other cranberry products you may drink or eat.      SIDE EFFECTS:  Nausea, loss of appetite, or stomach/abdominal pain may occur. If any of these effects persist or worsen, tell your doctor or pharmacist promptly. Remember that your doctor has prescribed this medication because he or she has judged that the benefit to you is greater than the risk of side effects. Many people using this medication do not have serious side effects. This medication can cause serious bleeding if it affects your blood clotting proteins too much (shown by unusually high INR lab results). Even if your doctor stops your medication, this risk of bleeding can continue for up to a week. Tell your doctor right away if you have any signs of serious bleeding, including: unusual pain/swelling/discomfort, unusual/easy bruising, prolonged bleeding from cuts or gums, persistent/frequent nosebleeds, unusually heavy/prolonged menstrual flow, pink/dark urine, coughing up blood, vomit that is bloody or looks like coffee grounds, severe headache, dizziness/fainting, unusual or persistent tiredness/weakness, bloody/black/tarry stools, chest pain, shortness of breath, difficulty swallowing. Tell your doctor right away if any of these unlikely but serious side effects occur: persistent nausea/vomiting, severe stomach/abdominal pain, yellowing eyes/skin. This drug rarely has caused very serious (possibly fatal) problems if its effects lead to small blood clots (usually at the beginning of treatment). This can lead to severe skin/tissue damage that may  require surgery or amputation if left untreated. Patients with certain blood conditions (protein C or S deficiency) may be at greater risk. Get medical help right away if any of these rare but serious side effects occur: painful/red/purplish patches on the skin (such as on the toe, breast, abdomen), change in the amount of urine, vision changes, confusion, slurred speech, weakness on one side of the body. A very serious allergic reaction to this drug is rare. However, get medical help right away if you notice any symptoms of a serious allergic reaction, including: rash, itching/swelling (especially of the face/tongue/throat), severe dizziness, trouble breathing. This is not a complete list of possible side effects. If you notice other effects not listed above, contact your doctor or pharmacist. In the [place] - Call your doctor for medical advice about side effects. You may report side effects to FDA at 6-648-REJ-8758. In [place] - Call your doctor for medical advice about side effects. You may report side effects to [facility] [place] at [phone].      PRECAUTIONS:  Before taking warfarin, tell your doctor or pharmacist if you are allergic to it; or if you have any other allergies. This product may contain inactive ingredients, which can cause allergic reactions or other problems. Talk to your pharmacist for more details. Before using this medication, tell your doctor or pharmacist your medical history, especially of: blood disorders (such as anemia, hemophilia), bleeding problems (such as bleeding of the stomach/intestines, bleeding in the brain), blood vessel disorders (such as aneurysms), recent major injury/surgery, liver disease, alcohol use, mental/mood disorders (including memory problems), frequent falls/injuries. It is important that all your doctors and dentists know that you take warfarin. Before having surgery or any medical/dental procedures, tell your doctor or dentist that you are taking this medication  and about all the products you use (including prescription drugs, nonprescription drugs, and herbal products). Avoid getting injections into the muscles. If you must have an injection into a muscle (for example, a flu shot), it should be given in the arm. This way, it will be easier to check for bleeding and/or apply pressure bandages. This medication may cause stomach bleeding. Daily use of alcohol while using this medicine will increase your risk for stomach bleeding and may also affect how this medication works. Limit or avoid alcoholic beverages. If you have not been eating well, if you have an illness or infection that causes fever, vomiting, or diarrhea for more than 2 days, or if you start using any antibiotic medications, contact your doctor or pharmacist immediately because these conditions can affect how warfarin works. This medication can cause heavy bleeding. To lower the chance of getting cut, bruised, or injured, use great caution with sharp objects like safety razors and nail cutters. Use an electric razor when shaving and a soft toothbrush when brushing your teeth. Avoid activities such as contact sports. If you fall or injure yourself, especially if you hit your head, call your doctor immediately. Your doctor may need to check you. The Food & Drug Administration has stated that generic warfarin products are interchangeable. However, consult your doctor or pharmacist before switching warfarin products. Be careful not to take more than one medication that contains warfarin unless specifically directed by the doctor or health care provider who is monitoring your warfarin treatment. Older adults may be at greater risk for bleeding while using this drug. This medication is not recommended for use during pregnancy because of serious (possibly fatal) harm to an unborn baby. Discuss the use of reliable forms of birth control with your doctor. If you become pregnant or think you may be pregnant, tell your  "doctor immediately. If you are planning pregnancy, discuss a plan for managing your condition with your doctor before you become pregnant. Your doctor may switch the type of medication you use during pregnancy. Very small amounts of this medication may pass into breast milk but is unlikely to harm a nursing infant. Consult your doctor before breast-feeding.      DRUG INTERACTIONS:  Drug interactions may change how your medications work or increase your risk for serious side effects. This document does not contain all possible drug interactions. Keep a list of all the products you use (including prescription/nonprescription drugs and herbal products) and share it with your doctor and pharmacist. Do not start, stop, or change the dosage of any medicines without your doctor's approval. Warfarin interacts with many prescription, nonprescription, vitamin, and herbal products. This includes medications that are applied to the skin or inside the vagina or rectum. The interactions with warfarin usually result in an increase or decrease in the \"blood-thinning\" (anticoagulant) effect. Your doctor or other health care professional should closely monitor you to prevent serious bleeding or clotting problems. While taking warfarin, it is very important to tell your doctor or pharmacist of any changes in medications, vitamins, or herbal products that you are taking. Some products that may interact with this drug include: capecitabine, imatinib, mifepristone. Aspirin, aspirin-like drugs (salicylates), and nonsteroidal anti-inflammatory drugs (NSAIDs such as ibuprofen, naproxen, celecoxib) may have effects similar to warfarin. These drugs may increase the risk of bleeding problems if taken during treatment with warfarin. Carefully check all prescription/nonprescription product labels (including drugs applied to the skin such as pain-relieving creams) since the products may contain NSAIDs or salicylates. Talk to your doctor about " using a different medication (such as acetaminophen) to treat pain/fever. Low-dose aspirin and related drugs (such as clopidogrel, ticlopidine) should be continued if prescribed by your doctor for specific medical reasons such as heart attack or stroke prevention. Consult your doctor or pharmacist for more details. Many herbal products interact with warfarin. Tell your doctor before taking any herbal products, especially bromelains, coenzyme Q10, cranberry, danshen, dong quai, fenugreek, garlic, ginkgo biloba, ginseng, and [place]'s wort, among others. This medication may interfere with a certain laboratory test to measure theophylline levels, possibly causing false test results. Make sure laboratory personnel and all your doctors know you use this drug.      OVERDOSE:  If overdose is suspected, contact a poison control center or emergency room immediately. [place] residents can call the [place] National Poison Hotline at 1-185.535.4309. [place] residents can call a provincial poison control center. Symptoms of overdose may include: bloody/black/tarry stools, pink/dark urine, unusual/prolonged bleeding.      NOTES:  Do not share this medication with others. Laboratory and/or medical tests (such as INR, complete blood count) must be performed periodically to monitor your progress or check for side effects. Consult your doctor for more details.      MISSED DOSE:  For the best possible benefit, do not miss any doses. If you do miss a dose and remember on the same day, take it as soon as you remember. If you remember on the next day, skip the missed dose and resume your usual dosing schedule. Do not double the dose to catch up because this could increase your risk for bleeding. Keep a record of missed doses to give to your doctor or pharmacist. Contact your doctor or pharmacist if you miss 2 or more doses in a row.      STORAGE:  Store at room temperature away from light and moisture. Do not store in the bathroom. Keep all  medications away from children and pets. Do not flush medications down the toilet or pour them into a drain unless instructed to do so. Properly discard this product when it is  or no longer needed. Consult your pharmacist or local waste disposal company for more details about how to safely discard your product.      MEDICAL ALERT:  Your condition and medication can cause complications in a medical emergency. For information about enrolling in MedicAlert, call 1-550.580.3873 ([place]) or 1-678.561.1869 ([place]).      Information last revised 2010 Copyright(c) 2010 [employer]             Depression / Suicide Risk    As you are discharged from this [facility] facility, it is important to learn how to keep safe from harming yourself.    Recognize the warning signs:  · Abrupt changes in personality, positive or negative- including increase in energy   · Giving away possessions  · Change in eating patterns- significant weight changes-  positive or negative  · Change in sleeping patterns- unable to sleep or sleeping all the time   · Unwillingness or inability to communicate  · Depression  · Unusual sadness, discouragement and loneliness  · Talk of wanting to die  · Neglect of personal appearance   · Rebelliousness- reckless behavior  · Withdrawal from people/activities they love  · Confusion- inability to concentrate     If you or a loved one observes any of these behaviors or has concerns about self-harm, here's what you can do:  · Talk about it- your feelings and reasons for harming yourself  · Remove any means that you might use to hurt yourself (examples: pills, rope, extension cords, firearm)  · Get professional help from the community (Mental Health, Substance Abuse, psychological counseling)  · Do not be alone:Call your Safe Contact- someone whom you trust who will be there for you.  · Call your local CRISIS HOTLINE 175-5001 or 980-566-0436  · Call your local Children's Mobile Crisis Response  Team [facility] (285) 601-0396 or www.[employer]  · Call the toll free National Suicide Prevention Hotlines   · National Suicide Prevention Lifeline 203-831-VWQG (3559)  · National Hope Line Network 800-SUICIDE (294-2263)    # Please continue your warfarin as prescribed.  # Please go to your scheduled appointment from anticoagulation clinic in 1week for warfarin dosage monitoring and possible changes. If it's level is not controlled, it may put you in risk of bleeding.  # Please schedule an appointment from gastroenterology preferably from Cesar Esquivel M.D. For further workup for your pancreatic lesion.   # Please avoid eating fatty foods.  # Please control your pain with tylenol as needed.   # If your abdominal pain gets worse or you have shortness of breath, fever, chills, nausea, vomiting please come to emergency.      Low-Fat Diet for Pancreatitis or Gallbladder Conditions  A low-fat diet can be helpful if you have pancreatitis or a gallbladder condition. With these conditions, your pancreas and gallbladder have trouble digesting fats. A healthy eating plan with less fat will help rest your pancreas and gallbladder and reduce your symptoms.  What do I need to know about this diet?  · Eat a low-fat diet.  ¨ Reduce your fat intake to less than 20-30% of your total daily calories. This is less than 50-60 g of fat per day.  ¨ Remember that you need some fat in your diet. Ask your dietician what your daily goal should be.  ¨ Choose nonfat and low-fat healthy foods. Look for the words “nonfat,” “low fat,” or “fat free.”  ¨ As a guide, look on the label and choose foods with less than 3 g of fat per serving. Eat only one serving.  · Avoid alcohol.  · Do not smoke. If you need help quitting, talk with your health care provider.  · Eat small frequent meals instead of three large heavy meals.  What foods can I eat?  Grains   Include healthy grains and starches such as potatoes, wheat bread, fiber-rich  cereal, and brown rice. Choose whole grain options whenever possible. In adults, whole grains should account for 45-65% of your daily calories.  Fruits and Vegetables   Eat plenty of fruits and vegetables. Fresh fruits and vegetables add fiber to your diet.  Meats and Other Protein Sources   Eat lean meat such as chicken and pork. Trim any fat off of meat before cooking it. Eggs, fish, and beans are other sources of protein. In adults, these foods should account for 10-35% of your daily calories.  Dairy   Choose low-fat milk and dairy options. Dairy includes fat and protein, as well as calcium.  Fats and Oils   Limit high-fat foods such as fried foods, sweets, baked goods, sugary drinks.  Other   Creamy sauces and condiments, such as mayonnaise, can add extra fat. Think about whether or not you need to use them, or use smaller amounts or low fat options.  What foods are not recommended?  · High fat foods, such as:  ¨ Baked goods.  ¨ Ice cream.  ¨ Nepali toast.  ¨ Sweet rolls.  ¨ Pizza.  ¨ Cheese bread.  ¨ Foods covered with batter, butter, creamy sauces, or cheese.  ¨ Fried foods.  ¨ Sugary drinks and desserts.  · Foods that cause gas or bloating  This information is not intended to replace advice given to you by your health care provider. Make sure you discuss any questions you have with your health care provider.  Document Released: 12/23/2014 Document Revised: 05/25/2017 Document Reviewed: 12/01/2014  [employer] Interactive Patient Education © 2017 [employer]    Acute Pancreatitis  Acute pancreatitis is a condition in which the pancreas suddenly becomes irritated and swollen (has inflammation). The pancreas is a gland that is located behind the stomach. It produces enzymes that help to digest food. The pancreas also releases the hormones glucagon and insulin, which help to regulate blood sugar. Damage to the pancreas occurs when the digestive enzymes from the pancreas are activated before they are released into  the intestine.  Most acute attacks last a couple of days and can cause serious problems. Some people become dehydrated and develop low blood pressure. In severe cases, bleeding into the pancreas can lead to shock and can be life-threatening. The lungs, heart, and kidneys may fail.  What are the causes?  The most common causes of this condition are:  · Alcohol abuse.  · Gallstones.  Other causes include:  · Certain medicines.  · Exposure to certain chemicals.  · Infection.  · Damage caused by an accident (trauma).  · Abdominal surgery.  In some cases, the cause may not be known.  What are the signs or symptoms?  Symptoms of this condition include:  · Pain in the upper abdomen that may radiate to the back.  · Tenderness and swelling of the abdomen.  · Nausea and vomiting.  How is this diagnosed?  This condition may be diagnosed based on:  · A physical exam.  · Blood tests.  · Imaging tests, such as X-rays, CT scans, or an ultrasound of the abdomen.  How is this treated?  Treatment for this condition usually requires a stay in the hospital. Treatment may include:  · Pain medicine.  · Fluid replacement through an IV tube.  · Placing a tube in the stomach to remove stomach contents and to control vomiting (NG tube, or nasogastric tube).  · Not eating for 3-4 days. This gives the pancreas a rest, because enzymes are not being produced that can cause further damage.  · Antibiotic medicines, if your condition is caused by an infection.  · Surgery on the pancreas or gallbladder.  Follow these instructions at home:  Eating and drinking  · Follow instructions from your health care provider about diet. This may involve avoiding alcohol and decreasing the amount of fat in your diet.  · Eat smaller, more frequent meals. This reduces the amount of digestive fluids that the pancreas produces.  · Drink enough fluid to keep your urine clear or pale yellow.  · Do not drink alcohol if it caused your condition.  General  instructions  · Take over-the-counter and prescription medicines only as told by your health care provider.  · Do not use any tobacco products, such as cigarettes, chewing tobacco, and e-cigarettes. If you need help quitting, ask your health care provider.  · Get plenty of rest.  · If directed, check your blood sugar at home as told by your health care provider.  · Keep all follow-up visits as told by your health care provider. This is important.  Contact a health care provider if:  · You do not recover as quickly as expected.  · You develop new or worsening symptoms.  · You have persistent pain, weakness, or nausea.  · You recover and then have another episode of pain.  · You have a fever.  Get help right away if:  · You cannot eat or keep fluids down.  · Your pain becomes severe.  · Your skin or the white part of your eyes turns yellow (jaundice).  · You vomit.  · You feel dizzy or you faint.  · Your blood sugar is high (over 300 mg/dL).  This information is not intended to replace advice given to you by your health care provider. Make sure you discuss any questions you have with your health care provider.  Document Released: 12/18/2006 Document Revised: 04/26/2017 Document Reviewed: 09/20/2016  [employer] Interactive Patient Education © 2017 [employer]

## 2019-09-13 NOTE — PROGRESS NOTES
Bedside report received, patient care assumed. Pt resting in bed comfortably with eyes closed. Tele box in place. No s/sx of acute distress noted. POC reviewed and questions answered. Bed in locked and low position, fall precautions in place. Call light in reach. Educated to call for assistance.

## 2019-09-13 NOTE — PROGRESS NOTES
Discharge instructions given and discussed, signed copy in chart. Pt verbalized understanding and all questions answered. All prescriptions reviewed. Patient is Alert & Oriented and discharged home in stable condition on 2 L O2 via wheelchair escorted by staff. Personal belongings with patient. IV removed and tolerated well. Tele box removed, monitor room notified.

## 2019-09-13 NOTE — PROGRESS NOTES
Internal Medicine Interval Note  Note Author: Didier Thorne M.D.     Name Joshua Medrano     1965   Age/Sex 53 y.o. male   MRN [id number]   Code Status Full     After 5PM or if no immediate response to page, please call for cross-coverage  Attending/Team: Dr Velasquez See Patient List for primary contact information  Call [phone] to page    1st Call - Day Intern (R1):   Dr Thorne 2nd Call - Day Sr. Resident (R2/R3):             Reason for interval visit  (Principal Problem)   Acute pancreatitis      Interval Problem Daily Status Update  (24 hours, problem oriented, brief subjective history, new lab/imaging data pertinent to that problem)   Patient had mild abdominal pain today, tolerated meals. GI recommended to see him within 6 weeks to further workup for pancreatic lesion with esophageal U/S and also recommended discharge on low fat diet.  Continued warfarin, no switch to DOAC. Patient will have anticoag clinic for dose adjustment.    Review of Systems   Constitutional: Negative for chills and fever.   Eyes: Negative for blurred vision and double vision.   Respiratory: Negative for shortness of breath.    Cardiovascular: Positive for leg swelling. Negative for chest pain.   Gastrointestinal: Positive for abdominal pain (mild). Negative for constipation, diarrhea, nausea and vomiting.   Genitourinary: Negative for dysuria.   Musculoskeletal: Negative for myalgias.   Skin: Negative for rash.   Neurological: Negative for headaches.       Disposition/Barriers to discharge:   None    Consultants/Specialty  GI: Steffi Caro, F.N.PAlex  Surgery: Dr. Kinjal Girard  PCP: Nam Le M.D.      Quality Measures  Quality-Core Measures   Ceballos catheter::  No Ceballos  DVT prophylaxis pharmacological::  Contraindicated - High bleeding risk  DVT prophylaxis - mechanical:  SCDs      Physical Exam       Vitals:    19 0606 19 0756 19 0759 19 1031   BP: 101/61 109/63     Pulse:  70 66 66 62   Resp:  16 16 16   Temp:  36.1 °C (97 °F)     TempSrc:  Temporal     SpO2:  90% 92% 96%   Weight:       Height:         Body mass index is 32.99 kg/m². Weight: (!) 126.2 kg (278 lb 3.5 oz)  Oxygen Therapy:  Pulse Oximetry: 96 %, O2 (LPM): 0, FiO2%: 21 %, O2 Delivery: Silicone Nasal Cannula    Physical Exam   Constitutional: He is oriented to person, place, and time and well-developed, well-nourished, and in no distress. No distress.   HENT:   Head: Normocephalic and atraumatic.   Mouth/Throat: No oropharyngeal exudate.   Eyes: Pupils are equal, round, and reactive to light. Conjunctivae and EOM are normal. Right eye exhibits no discharge. No scleral icterus.   Neck: Normal range of motion. No JVD present. No tracheal deviation present.   Cardiovascular: Normal rate, regular rhythm and intact distal pulses. Exam reveals no gallop and no friction rub.   Murmur (Systolic, 3/6) heard.  Pulmonary/Chest: Effort normal and breath sounds normal. No stridor. No respiratory distress. He has no wheezes. He has no rales. He exhibits no tenderness.   Abdominal: He exhibits no distension and no mass. Bowel sounds are hypoactive. There is tenderness in the epigastric area. There is no rebound and no guarding.       Musculoskeletal: He exhibits no edema, tenderness or deformity.        Legs:  Neurological: He is alert and oriented to person, place, and time. No cranial nerve deficit. Gait normal. Coordination normal.   Skin: No rash noted. He is not diaphoretic. No erythema. No pallor.   Psychiatric: Affect normal.   Vitals reviewed.        Assessment/Plan     * Acute pancreatitis- (present on admission)  Assessment & Plan  Stable acute pancreatitis of unknown etiology complicated with pseudocyst with lipase >586 on admission    Plan:  -New onset multiloculated cystic lesion in pancreatic body with elevated lipase levels.  -Does not drink alcohol, no gallstones seen on CT scan, no hypertriglyceridemia  -Stopped IV  fluids  -GI recommends EUS within 6 weeks outpatient. Ok to dc patient on low fat diet.    Supratherapeutic INR  Assessment & Plan  Supratherapeutic INR without any acute bleeding on presentation but h/o hematoma in the leg, on warfarin due to paroxysmal atrial fibrillation, with the INR >7 on admission     Plan:  -Vitamin K and FFP given.  -Monitor PT/INR with INR goal 2-3  -follow/up outpatient.    Pancreatic lesion  Assessment & Plan  Likely Pancreatic pseudocyst due to acute pancreatitis of unknown etiology with elevated lipase of >500 on admission.  Possible pancreatic mass.    Plan:  -CT pancreas done today showing: Multilocular cystic lesion in the pancreatic body which extends into the lesser sac with collection adjacent the medial margin of LEFT lobe liver.   -No surgical indication at this point   -GI recommends EUS to evaluate the lesion outpatient to differentiate mass vs pseudocyst. Ok to discharge patient on low fat diet.      Paroxysmal atrial fibrillation (HCC)  Assessment & Plan  Paroxysmal nonvalvular stable Atrial fibrillation without RVR controlled with warfarin, metoprolol, amiodarone with CHADsVASC:2 , HASBLED:2    Plan:  -Continue metoprolol, amiodarone  -Resumed warfarin.  -f/u with Cardiology, anticoagulation clinic outpatient.    Shortness of breath  Assessment & Plan  # Chronic shortness of breath without respiratory failure, secondary to COPD and smoking,   # Gold stage 3 COPD controlled with inhalers and 2L NC home oxygen, with last FEV1 35% and FEV1/FVC of 74%.  # Chronic right sided heart failure with preserved ejection fraction without decompensation, last EF of 55, with a history of flash pulmonary edema.    Plan:  -Asymptomatic now.    -cxr-mild interstitial edema, no signs of pulmonary edema.  -Unlikely resp infection/pneumonia.    Hematuria  Assessment & Plan  Gross hematuria likely secondary to supratherapeutic INR due to warfarin usage for paroxysmal Afib with urinary RBC  >100.    Plan:  -Hold warfarin, aim for 2-3 INR.  -f/u outpatient if it did not resolve.        Abdominal hernia  Assessment & Plan  Reducible ventral abdominal wall hernia without any signs of incarceration or obstruction    Plan:  -No treatment indicated this time.   -F/u outpatient with general surgery.    Leukocytosis- (present on admission)  Assessment & Plan  Leukocytosis without fever likely secondary to acute pancreatitis.    Plan:  Pt received ceftriaxone in ED. Stopped antibiotics, no signs of infection.  Persistently elevated WBCs, however more elevated than from baseline.  WBC is downtrending  CTM with CBC outpatient.

## 2019-09-13 NOTE — PROGRESS NOTES
Bedside report received from night nurse. Assumed care of pt. Pt awake, laying in bed. A/Ox4, VSS. No complaints at this time. POC reviewed and white board updated. Tele box removed for medical orders. Monitor room notified. Call light in reach. Bed locked in lowest position with 2 upper bed rails up. Fall precautions in place. Pt is refusing morning lab draw. This is labs second attempt. [employer] at bedside made aware.

## 2019-09-13 NOTE — DISCHARGE PLANNING
"Anticipated Discharge Disposition: Home without skilled needs.    Action: Assessment done. Spouse will be ride home on day of dc. Both are not working and live with their daughter and son-in-law (and four grandkids) in [place]. Pt uses [facility] on [address] In [place]. Pt brings in $720 per month, social security disability, states wife doesn't work, \"she takes care of me\".    Barriers to Discharge: None.    Plan: Assist with any dc needs that may be identified.    "

## 2019-09-13 NOTE — CARE PLAN
Problem: Safety  Goal: Will remain free from injury  Outcome: PROGRESSING AS EXPECTED  Pt educated to use call light before getting out of bed. Call light in reach. Bed locked in lowest position with 2 upper bed rails up. Pt encouraged to sit at edge of bed before standing up. No c/o dizziness. Stand by assistance given to help pt to the bathroom and back to bed. Room floor is free from clutter. Treaded socks on pt.       Problem: Knowledge Deficit  Goal: Knowledge of disease process/condition, treatment plan, diagnostic tests, and medications will improve  Outcome: PROGRESSING AS EXPECTED   Pt educated on plan of care, medications, pain management, and disease processes. Pt verbalized understanding and was encouraged to ask questions. All questions answered.

## 2019-09-14 NOTE — DISCHARGE SUMMARY
Internal Medicine Discharge Summary  Note Author: Cynthia Perales M.D.       Name Joshua Medrano     1965   Age/Sex 53 y.o. male   MRN [id number]     Admit Date:  9/10/2019       Discharge Date:   2019    Service:   [facility] Internal Medicine red team  Attending Physician(s):   Dr. Velasquez       Senior Resident(s):   Dr. Perales  Julius Resident(s):   Dr. Thorne  PCP: Nam Le M.D.    Primary Diagnosis:   Acute pancreatitis      Secondary Diagnoses:                Principal Problem:    Acute pancreatitis POA: Yes  Active Problems:    Pancreatic lesion POA: Unknown    Supratherapeutic INR POA: Unknown    Paroxysmal atrial fibrillation (HCC) POA: Clinically Undetermined    Leukocytosis POA: Yes    Abdominal hernia POA: Unknown    Hematuria POA: Unknown    Shortness of breath POA: Unknown  Resolved Problems:    * No resolved hospital problems. *      Hospital Summary (Brief Narrative):       Mr. Medrano is a pleasant 52 yo male with PMHx of CAD s/p CABG, Paroxysmal A.fib on warfarin, HFpEF 55%, HTN, SHASHANK, COPD and DM2 with recent admission for left calf hematoma who presented with worsening abdominal pain.  He was found to have nonsevere acute pancreatitis of unknown etiology but CT findings were consistent with some chronic pancreatitis.  CT scan also showed a new nonspecific lobulated multiloculated cystic lesion in the pancreatic body he of 4.1 x 2.4 cm.  His lipase at the time of admission was 540s. It was originally thought that the mass in the CT scan could be a hematoma versus pancreatic pseudocyst versus a neoplasm.  He was recommended for MRI of the pancreas however this was unable to be performed due to patient size.  He did present with a supratherapeutic INR and so bleed risk was considered.  He was treated for pancreatitis given pain control and gentle IV fluids considering his previous history of flash pulmonary edema.  Surgery and gastroenterology were consulted and recommended further  work-up of the mass including CT pancreas.  The CTs pancreas favored pancreatic pseudocyst however neoplasm still a consideration.  He was recommended outpatient work-up with gastroenterology in about 6 weeks from his discharge.  His abdominal pain improved over the course of several days and he was able to tolerate a soft diet and was discharged home with plans to follow-up with anticoagulation clinic, his primary care physician, and gastroenterology.    Patient /Hospital Summary (Details -- Problem Oriented) :          Supratherapeutic INR  Assessment & Plan  Supratherapeutic INR without any acute bleeding on presentation but h/o hematoma in the leg, on warfarin due to paroxysmal atrial fibrillation, with the INR >7 on admission     Plan:  -Vitamin K and FFP given.  -Monitor PT/INR with INR goal 2-3  -follow/up outpatient.    Pancreatic lesion  Assessment & Plan  Likely Pancreatic pseudocyst due to acute pancreatitis of unknown etiology with elevated lipase of >500 on admission.  Possible pancreatic mass.    Plan:  -CT pancreas done today showing: Multilocular cystic lesion in the pancreatic body which extends into the lesser sac with collection adjacent the medial margin of LEFT lobe liver.   -No surgical indication at this point   -GI recommends EUS to evaluate the lesion outpatient to differentiate mass vs pseudocyst. Ok to discharge patient on low fat diet.      * Acute pancreatitis  Assessment & Plan  Stable acute pancreatitis of unknown etiology complicated with pseudocyst with lipase >586 on admission    Plan:  -New onset multiloculated cystic lesion in pancreatic body with elevated lipase levels.  -Does not drink alcohol, no gallstones seen on CT scan, no hypertriglyceridemia  -Stopped IV fluids  -GI recommends EUS within 6 weeks outpatient. Ok to dc patient on low fat diet.    Paroxysmal atrial fibrillation (HCC)  Assessment & Plan  Paroxysmal nonvalvular stable Atrial fibrillation without RVR controlled  with warfarin, metoprolol, amiodarone with CHADsVASC:2 , HASBLED:2    Plan:  -Continue metoprolol, amiodarone  -Resumed warfarin.  -f/u with Cardiology, anticoagulation clinic outpatient.    Shortness of breath  Assessment & Plan  # Chronic shortness of breath without respiratory failure, secondary to COPD and smoking,   # Gold stage 3 COPD controlled with inhalers and 2L NC home oxygen, with last FEV1 35% and FEV1/FVC of 74%.  # Chronic right sided heart failure with preserved ejection fraction without decompensation, last EF of 55, with a history of flash pulmonary edema.    Plan:  -Asymptomatic now.    -cxr-mild interstitial edema, no signs of pulmonary edema.  -Unlikely resp infection/pneumonia.    Hematuria  Assessment & Plan  Gross hematuria likely secondary to supratherapeutic INR due to warfarin usage for paroxysmal Afib with urinary RBC >100.    Plan:  -Hold warfarin, aim for 2-3 INR.  -f/u outpatient if it did not resolve.        Abdominal hernia  Assessment & Plan  Reducible ventral abdominal wall hernia without any signs of incarceration or obstruction    Plan:  -No treatment indicated this time.   -F/u outpatient with general surgery.    Leukocytosis  Assessment & Plan  Leukocytosis without fever likely secondary to acute pancreatitis.    Plan:  Pt received ceftriaxone in ED. Stopped antibiotics, no signs of infection.  Persistently elevated WBCs, however more elevated than from baseline.  WBC is downtrending  CTM with CBC outpatient.    Consultants:     : General surgery  Dr. Esquivel: Gastroenterology    Procedures:        None    Imaging/ Testing:      CT-PANCREAS AND ABDOMEN WITH & W/O   Final Result      1.  Multilocular cystic lesion in the pancreatic body which extends into the lesser sac with collection adjacent the medial margin of LEFT lobe liver.  This finding is new compared to prior exam dated 8/21/2018 with appearance favoring pancreatic    pseudocyst.  Pancreatic neoplasm remains a  consideration.   2.  Splenomegaly.   3.  Minimal ascites.   4.  Mild radha hepatis adenopathy, nonspecific.   5.  Stable LEFT adrenal nodule.   6.  Complex fat-containing ventral abdominal hernia.            CT-ABDOMEN-PELVIS WITH   Final Result         1.  New nonspecific lobulated/multiloculated cystic lesion in the pancreatic body measuring approximately 4.1 x 2.4 cm. Pancreatic mass protocol MRI of the abdomen is recommended for further evaluation.   2.  Splenomegaly, mildly improved from prior.   3.  Borderline hepatomegaly.   4.  Stable complex fat-containing ventral/periumbilical hernia.               DX-CHEST-PORTABLE (1 VIEW)   Final Result   Addendum 1 of 1      9/10/2019 9:05 PM      HISTORY/REASON FOR EXAM:  Shortness of breath.   Abdominal pain      TECHNIQUE/EXAM DESCRIPTION AND NUMBER OF VIEWS:   Single portable view of the chest.      COMPARISON: 11/10/2018      FINDINGS:   Single portable view of the chest demonstrates stable cardiomegaly.    Sternal wires are in place from prior CABG.      There are increased perihilar interstitial markings. No significant    pleural fluid or pneumothorax.      Irregularity in the right lateral chest wall is likely related to remote    fractures         1.  Cardiomegaly.      2.  Mild interstitial edema is favored over atypical infection.      Final        Discharge Medications:         Medication Reconciliation: Completed       Medication List      CHANGE how you take these medications      Instructions   acetaminophen 325 MG Tabs  What changed:    · medication strength  · how much to take  · when to take this  · reasons to take this  Commonly known as:  TYLENOL   Take 2 Tabs by mouth every 6 hours as needed (Mild Pain; (Pain scale 1-3); Temp greater than 100.5 F).  Dose:  650 mg        CONTINUE taking these medications      Instructions   albuterol 108 (90 Base) MCG/ACT Aers inhalation aerosol   Inhale 2 Puffs by mouth every 6 hours as needed for Shortness of  Breath.  Dose:  2 Puff     amiodarone 200 MG Tabs  Commonly known as:  CORDARONE   Take 200 mg by mouth every morning.  Dose:  200 mg     ferrous sulfate 325 (65 Fe) MG tablet   Take 325 mg by mouth every morning.  Dose:  325 mg     furosemide 40 MG Tabs  Commonly known as:  LASIX   Take 40 mg by mouth every morning.  Dose:  40 mg     ipratropium-albuterol 0.5-2.5 (3) MG/3ML nebulizer solution  Commonly known as:  DUONEB   3 mL by Nebulization route every 6 hours as needed for Shortness of Breath.  Dose:  3 mL     lisinopril 2.5 MG Tabs  Commonly known as:  PRINIVIL   Take 2.5 mg by mouth every morning.  Dose:  2.5 mg     metoprolol SR 25 MG Tb24  Commonly known as:  TOPROL XL   Take 25 mg by mouth every morning.  Dose:  25 mg     polyethylene glycol/lytes Pack  Commonly known as:  MIRALAX   Take 17 g by mouth every morning.  Dose:  17 g     potassium chloride SA 10 MEQ Tbcr  Commonly known as:  K-DUR   Take 10 mEq by mouth every morning.  Dose:  10 mEq     rosuvastatin 40 MG tablet  Commonly known as:  CRESTOR   Take 40 mg by mouth every morning.  Dose:  40 mg     spironolactone 25 MG Tabs  Commonly known as:  ALDACTONE   Take 25 mg by mouth every morning.  Dose:  25 mg     tiotropium 18 MCG Caps  Commonly known as:  SPIRIVA   Inhale 18 mcg by mouth every morning.  Dose:  18 mcg     warfarin 5 MG Tabs  Commonly known as:  COUMADIN   Take 7.5 mg by mouth every morning.  Dose:  7.5 mg          Disposition:   Home    Diet:   Low residue, low fat, soft diet    Activity:   As tolerated    Instructions:       The patient was instructed to return to the ER in the event of worsening symptoms. I have counseled the patient on the importance of compliance and the patient has agreed to proceed with all medical recommendations and follow up plan indicated above.   The patient understands that all medications come with benefits and risks. Risks may include permanent injury or death and these risks can be minimized with close  reassessment and monitoring.        Primary Care Provider:    Nam Carey M.D.  Discharge summary faxed to primary care provider:  Deferred  Copy of discharge summary given to the patient: Deferred      Follow up appointment details :      Future Appointments   Date Time Provider Department Center   9/20/2019 10:00 AM [facility] EXAM 5 [facility] None   11/11/2019  2:15 PM Jett Bedoya M.D. [facility] None     Cesar Esquivel M.D.  [address]   [place] [address]  [phone]      Please call to schedule your hospital follow up . Thank you     Marleny Mclain M.D.  [address]  [place] [address]  [place] [address]  [phone]    Go on 9/17/2019  Please arrrive at 2pm for your appointment at 2:20 pm  as Dr carey is booked out.  Thank you     Pending Studies:        None    Time spent on discharge day patient visit, preparing discharge paperwork and arranging for patient follow up.    Summary of follow up issues:   -Pancreatic body mass needs to be followed up with further work-up with gastroenterology.  EUS recommended in 4 to 6 weeks from discharge.  -INR supratherapeutic during the course of his hospital stay, on admission warfarin was held.  He is scheduled to follow-up with anticoagulation clinic on discharge but will continue warfarin for now.  -Hematuria on urinalysis, likely secondary to warfarin use and supratherapeutic INR.  Needs repeat urinalysis, possible consultation to urology if hematuria persists    Discharge Time (Minutes) :    50 minutes  Hospital Course Type:  Inpatient Stay >2 midnights      Condition on Discharge    ______________________________________________________________________    Interval history/exam for day of discharge:    Patient had mild abdominal pain today, tolerated meals. GI recommended to see him within 6 weeks to further workup for pancreatic lesion with esophageal U/S and also recommended discharge on low fat diet.  Continued warfarin, no switch to DOAC. Patient will have anticoag  clinic for dose adjustment.     Review of Systems   Constitutional: Negative for chills and fever.   Eyes: Negative for blurred vision and double vision.   Respiratory: Negative for shortness of breath.    Cardiovascular: Positive for leg swelling. Negative for chest pain.   Gastrointestinal: Positive for abdominal pain (mild). Negative for constipation, diarrhea, nausea and vomiting.   Genitourinary: Negative for dysuria.   Musculoskeletal: Negative for myalgias.   Skin: Negative for rash.   Neurological: Negative for headaches.     Physical Exam   Constitutional: He is oriented to person, place, and time and well-developed, well-nourished, and in no distress. No distress.   HENT:   Head: Normocephalic and atraumatic.   Mouth/Throat: No oropharyngeal exudate.   Eyes: Pupils are equal, round, and reactive to light. Conjunctivae and EOM are normal. Right eye exhibits no discharge. No scleral icterus.   Neck: Normal range of motion. No JVD present. No tracheal deviation present.   Cardiovascular: Normal rate, regular rhythm and intact distal pulses. Exam reveals no gallop and no friction rub.   Murmur (Systolic, 3/6) heard.  Pulmonary/Chest: Effort normal and breath sounds normal. No stridor. No respiratory   stress. He has no wheezes. He has no rales. He exhibits no tenderness.   Abdominal: He exhibits no distension and no mass. Bowel sounds are hypoactive. There is tenderness in the epigastric area. There is no rebound and no guarding.       Musculoskeletal: He exhibits no edema, tenderness or deformity.        Legs:  Neurological: He is alert and oriented to person, place, and time. No cranial nerve deficit. Gait normal. Coordination normal.   Skin: No rash noted. He is not diaphoretic. No erythema. No pallor.   Psychiatric: Affect normal.   Vitals reviewed.  Most Recent Labs:    Lab Results   Component Value Date/Time    WBC 17.0 (H) 09/13/2019 10:56 AM    RBC 6.61 (H) 09/13/2019 10:56 AM    HEMOGLOBIN 16.8  09/13/2019 10:56 AM    HEMATOCRIT 54.8 (H) 09/13/2019 10:56 AM    MCV 82.9 09/13/2019 10:56 AM    MCH 25.4 (L) 09/13/2019 10:56 AM    MCHC 30.7 (L) 09/13/2019 10:56 AM    MPV 10.0 09/13/2019 10:56 AM    NEUTSPOLYS 87.00 (H) 09/13/2019 10:56 AM    LYMPHOCYTES 2.90 (L) 09/13/2019 10:56 AM    MONOCYTES 5.80 09/13/2019 10:56 AM    EOSINOPHILS 2.10 09/13/2019 10:56 AM    EOSINOPHILS 2 11/07/2018 09:34 AM    BASOPHILS 0.60 09/13/2019 10:56 AM    HYPOCHROMIA 1+ 09/10/2019 08:09 PM    ANISOCYTOSIS 2+ 09/10/2019 08:09 PM      Lab Results   Component Value Date/Time    SODIUM 128 (L) 09/13/2019 10:56 AM    POTASSIUM 4.0 09/13/2019 10:56 AM    CHLORIDE 95 (L) 09/13/2019 10:56 AM    CO2 22 09/13/2019 10:56 AM    GLUCOSE 195 (H) 09/13/2019 10:56 AM    BUN 14 09/13/2019 10:56 AM    CREATININE 0.95 09/13/2019 10:56 AM      Lab Results   Component Value Date/Time    ALTSGPT 7 09/13/2019 10:56 AM    ASTSGOT 9 (L) 09/13/2019 10:56 AM    ALKPHOSPHAT 136 (H) 09/13/2019 10:56 AM    TBILIRUBIN 0.7 09/13/2019 10:56 AM    LIPASE 546 (H) 09/12/2019 10:54 AM    ALBUMIN 3.2 09/13/2019 10:56 AM    GLOBULIN 4.4 (H) 09/13/2019 10:56 AM    PREALBUMIN 10.0 (L) 01/29/2018 03:58 AM    INR 1.67 (H) 09/13/2019 10:56 AM    MACROCYTOSIS 1+ 08/18/2019 02:55 AM     Lab Results   Component Value Date/Time    PROTHROMBTM 20.2 (H) 09/13/2019 10:56 AM    INR 1.67 (H) 09/13/2019 10:56 AM

## 2019-09-20 ENCOUNTER — ANTICOAGULATION MONITORING (OUTPATIENT)
Dept: VASCULAR LAB | Facility: MEDICAL CENTER | Age: 54
End: 2019-09-20

## 2019-09-20 DIAGNOSIS — I48.0 PAROXYSMAL ATRIAL FIBRILLATION (HCC): ICD-10-CM

## 2019-09-20 LAB — INR PPP: 2.4 (ref 2–3.5)

## 2019-09-20 NOTE — PROGRESS NOTES
Anticoagulation Summary  As of 2019    INR goal:   2.0-3.0   TTR:   40.4 % (1.1 y)   INR used for dosin.40 (2019)   Warfarin maintenance plan:   5 mg (5 mg x 1) every Mon, Wed, Fri; 7.5 mg (5 mg x 1.5) all other days   Weekly warfarin total:   45 mg   Plan last modified:   Zak CanD (2019)   Next INR check:   2019   Target end date:   Indefinite    Indications    Paroxysmal atrial fibrillation (HCC) [I48.0]  Atrial flutter with rapid ventricular response (HCC) (Resolved) [I48.92]             Anticoagulation Episode Summary     INR check location:       Preferred lab:       Send INR reminders to:       Comments:   Jia KIM      Anticoagulation Care Providers     Provider Role Specialty Phone number    Jett Bedoya M.D. Referring Cardiology [phone]    [facility] Responsible  [phone]        Anticoagulation Patient Findings      Spoke with patient.  INR is therapeutic.   Pt was inpt from 9/10- with acute pancreatitis.   Pt's INR was >7 at admission. Previous INR was 4.0 prior to admission.   During admission appears that was given 7.5mg on vitK and FPP.  Pt denies any unusual s/s of bleeding (denies hematuria), bruising, clotting or any changes to diet or medications. Denies any etoh, cranberries, supplements, or illness.     Will have pt states that on  and 9/15 he took just 2.5mg warfarin daily and there after has been taking 7.5mg warfarin.      Discussed with pt, as pt most likely has/had some warfarin resistance due to such a large dose of vitK was given inpt. However concerned that if we continue with warfarin 7.5mg daily pt's INR will increase due to overcoming this resistance, and as the 3 previous INRs were elevated at this dose. So will have pt start a reduced dose of 7.5mg daily except 5mg on ,  and .     Repeat INR in 1 week(s).     Wandy Garzon, PharmD

## 2019-09-23 ENCOUNTER — TELEPHONE (OUTPATIENT)
Dept: VASCULAR LAB | Facility: MEDICAL CENTER | Age: 54
End: 2019-09-23

## 2019-09-23 ENCOUNTER — TELEPHONE (OUTPATIENT)
Dept: CARDIOLOGY | Facility: MEDICAL CENTER | Age: 54
End: 2019-09-23

## 2019-09-23 NOTE — TELEPHONE ENCOUNTER
RE: DHA clearance request   Received: Today   Message Contents   LISSA Ayala R.N.             Ok to proceed with moderate risk.     Jett Bedoya M.D.    Previous Messages      ----- Message -----   From: Ina Smith R.N.   Sent: 9/20/2019   4:36 PM PDT   To: Jett Bedoya M.D.   Subject: DHA clearance request                             10/15 EUS-endoscopic US   Cardiac clearance requested         Clearance completed as directed and faxed with receipt confirmation.

## 2019-10-01 ENCOUNTER — ANTICOAGULATION VISIT (OUTPATIENT)
Dept: VASCULAR LAB | Facility: MEDICAL CENTER | Age: 54
End: 2019-10-01
Attending: INTERNAL MEDICINE
Payer: MEDICARE

## 2019-10-01 DIAGNOSIS — I48.0 PAROXYSMAL ATRIAL FIBRILLATION (HCC): ICD-10-CM

## 2019-10-01 LAB
INR BLD: 3.8 (ref 0.9–1.2)
INR PPP: 3.8 (ref 2–3.5)

## 2019-10-01 PROCEDURE — 85610 PROTHROMBIN TIME: CPT

## 2019-10-01 PROCEDURE — 99213 OFFICE O/P EST LOW 20 MIN: CPT

## 2019-10-01 NOTE — PROGRESS NOTES
Anticoagulation Summary  As of 10/1/2019    INR goal:   2.0-3.0   TTR:   40.4 % (1.1 y)   INR used for dosing:   3.80! (10/1/2019)   Warfarin maintenance plan:   5 mg (5 mg x 1) every Mon, Wed, Fri; 7.5 mg (5 mg x 1.5) all other days   Weekly warfarin total:   45 mg   Plan last modified:   Wandy Garzon, PharmD (9/20/2019)   Next INR check:   10/4/2019   Target end date:   Indefinite    Indications    Paroxysmal atrial fibrillation (HCC) [I48.0]  Atrial flutter with rapid ventricular response (HCC) (Resolved) [I48.92]             Anticoagulation Episode Summary     INR check location:       Preferred lab:       Send INR reminders to:       Comments:   Jia       Anticoagulation Care Providers     Provider Role Specialty Phone number    Jett Bedoya M.D. Referring Cardiology [phone]    [facility] Responsible  [phone]           Anticoagulation Patient Findings  Patient Findings     Positives:   Missed doses          HPI:  Joshua Medrano seen in clinic today, on anticoagulation therapy with warfarin for atrial fibrillation  Changes to current medical/health status since last appt: none  Denies signs/symptoms of bleeding and/or thrombosis since the last appt.    Denies any interval changes to diet  Denies any interval changes to medications since last appt.   Denies any complications or cost restrictions with current therapy.   BP check declined      A/P   INR  supra-therapeutic.   Instructed pt to hold x 1 day, then continue current regimen    [facility]    Pt to have procedure on 10/15/19. Due to Pt history lovenox bridging is indicated.   Pt to follow instructions as below, after procedure to ask operating MD when safe to resume anticoagulation, if no instructions given, pt will follow as below        Date  Warfarin dosing AM Lovenox PM Lovenox   5 Days before procedure 10/10 0mg NONE NONE   4 Days before procedure 10/11 0mg Lovenox  injection Lovenox injection   3 Days before procedure 10/12 0mg Lovenox injection Lovenox injection   2 Days before procedure 10/13 0mg Lovenox injection Lovenox injection   1 Days before procedure 10/14 0mg NONE NONE   PROCEDURE 10/15 7.5 mg NONE NONE   1 Day after procedure 10/16 7.5 mg Restart Lovenox injections    Lovenox injection   2 Days after procedure 10/16 5 mg Lovenox injection Lovenox injection   3 Days after procedure 10/17 5 mg Lovenox injection Lovenox injection   4 Days after procedure 10/18 5 mg Lovenox injection GET INR checked       Magali Vazquez, PharmD

## 2019-10-01 NOTE — PATIENT INSTRUCTIONS
Pt to have procedure on 10/15/19. Due to Pt history lovenox bridging is indicated.   Pt to follow instructions as below, after procedure to ask operating MD when safe to resume anticoagulation, if no instructions given, pt will follow as below        Date  Warfarin dosing AM Lovenox PM Lovenox   5 Days before procedure 10/10 0mg NONE NONE   4 Days before procedure 10/11 0mg Lovenox injection Lovenox injection   3 Days before procedure 10/12 0mg Lovenox injection Lovenox injection   2 Days before procedure 10/13 0mg Lovenox injection Lovenox injection   1 Days before procedure 10/14 0mg NONE NONE   PROCEDURE 10/15 7.5 mg NONE NONE   1 Day after procedure 10/16 7.5 mg Restart Lovenox injections    Lovenox injection   2 Days after procedure 10/16 5 mg Lovenox injection Lovenox injection   3 Days after procedure 10/17 5 mg Lovenox injection Lovenox injection   4 Days after procedure 10/18 5 mg Lovenox injection GET INR checked     Zak OvalleD

## 2019-10-10 ENCOUNTER — TELEPHONE (OUTPATIENT)
Dept: VASCULAR LAB | Facility: MEDICAL CENTER | Age: 54
End: 2019-10-10

## 2019-10-10 NOTE — TELEPHONE ENCOUNTER
LM for patient's spouse letting her know that PA process for lovenox injections had been submitted, we are just waiting on determination from insurance company at this time.  Phone number to clinic left for patient should she need to contact.  Johnathan Chen, ZakD, BCACP

## 2019-10-18 ENCOUNTER — TELEPHONE (OUTPATIENT)
Dept: VASCULAR LAB | Facility: MEDICAL CENTER | Age: 54
End: 2019-10-18

## 2019-10-21 ENCOUNTER — ANTICOAGULATION VISIT (OUTPATIENT)
Dept: VASCULAR LAB | Facility: MEDICAL CENTER | Age: 54
End: 2019-10-21
Attending: INTERNAL MEDICINE
Payer: MEDICARE

## 2019-10-21 VITALS — HEART RATE: 87 BPM | SYSTOLIC BLOOD PRESSURE: 112 MMHG | DIASTOLIC BLOOD PRESSURE: 71 MMHG

## 2019-10-21 DIAGNOSIS — I48.0 PAROXYSMAL ATRIAL FIBRILLATION (HCC): ICD-10-CM

## 2019-10-21 LAB — INR PPP: 1.8 (ref 2–3.5)

## 2019-10-21 PROCEDURE — 85610 PROTHROMBIN TIME: CPT

## 2019-10-21 PROCEDURE — 99212 OFFICE O/P EST SF 10 MIN: CPT | Performed by: NURSE PRACTITIONER

## 2019-10-21 NOTE — PROGRESS NOTES
Anticoagulation Summary  As of 10/21/2019    INR goal:   2.0-3.0   TTR:   40.9 % (1.2 y)   INR used for dosin.80! (10/21/2019)   Warfarin maintenance plan:   5 mg (5 mg x 1) every Mon, Wed, Fri; 7.5 mg (5 mg x 1.5) all other days   Weekly warfarin total:   45 mg   Plan last modified:   Wandy Garzon, PharmD (2019)   Next INR check:   10/28/2019   Target end date:   Indefinite    Indications    Paroxysmal atrial fibrillation (HCC) [I48.0]  Atrial flutter with rapid ventricular response (HCC) (Resolved) [I48.92]             Anticoagulation Episode Summary     INR check location:       Preferred lab:       Send INR reminders to:       Comments:   Jia KIM      Anticoagulation Care Providers     Provider Role Specialty Phone number    Jett Bedoya M.D. Referring Cardiology [phone]    [facility] Responsible  [phone]        Anticoagulation Patient Findings      HPI:  Joshua Medraon seen in clinic today for follow up on anticoagulation therapy in the presence of AF, CVA.   Had EGD on 10/15/19. He restarted warfarin the night of procedure. Was bridging with Lovenox but stopped them on Friday.   Denies any medication or diet changes.   No current symptoms of bleeding or thrombosis reported.    A/P:   INR subtherapeutic. Will increase tonight's dose then continue current regimen. Asked pt to restart Lovenox. He is hesitant but willing to give one injection today.  BP recorded in vitals.    Follow up appointment in 1 week(s).    Next Appointment: Monday, Oct 28, 2019 at 3:15 pm.    Taylor SHELTON

## 2019-10-22 LAB — INR BLD: 1.8 (ref 0.9–1.2)

## 2019-10-28 ENCOUNTER — ANTICOAGULATION VISIT (OUTPATIENT)
Dept: VASCULAR LAB | Facility: MEDICAL CENTER | Age: 54
End: 2019-10-28
Attending: INTERNAL MEDICINE
Payer: MEDICARE

## 2019-10-28 DIAGNOSIS — I48.0 PAROXYSMAL ATRIAL FIBRILLATION (HCC): ICD-10-CM

## 2019-10-28 LAB
INR BLD: 2.7 (ref 0.9–1.2)
INR PPP: 2.7 (ref 2–3.5)

## 2019-10-28 PROCEDURE — 85610 PROTHROMBIN TIME: CPT

## 2019-10-28 PROCEDURE — 99211 OFF/OP EST MAY X REQ PHY/QHP: CPT

## 2019-10-28 NOTE — PROGRESS NOTES
Anticoagulation Summary  As of 10/28/2019    INR goal:   2.0-3.0   TTR:   40.9 % (1.2 y)   INR used for dosing:      Plan last modified:   Wandy Garzon, PharmD (9/20/2019)   Next INR check:      Target end date:   Indefinite    Indications    Paroxysmal atrial fibrillation (HCC) [I48.0]  Atrial flutter with rapid ventricular response (HCC) (Resolved) [I48.92]             Anticoagulation Episode Summary     INR check location:       Preferred lab:       Send INR reminders to:       Comments:   Jia KIM      Anticoagulation Care Providers     Provider Role Specialty Phone number    Jett Bedoya M.D. Referring Cardiology [phone]    [facility] Responsible  [phone]                Anticoagulation Patient Findings      HPI:  Joshua Medrano seen in clinic today, on anticoagulation therapy with warfarin for afib with RVR  Changes to current medical/health status since last appt: none  Denies signs/symptoms of bleeding and/or thrombosis since the last appt.    Denies any interval changes to diet  Denies any interval changes to medications since last appt.   Denies any complications or cost restrictions with current therapy.   BP denied by patient.  Patient confirmed current dosing regimen    A/P   INR is therapeutic at 2.7.   Instructed patient to continue current regimen    Follow up appointment in 2 week(s).    Brian Jordan, ZakD

## 2019-11-11 ENCOUNTER — OFFICE VISIT (OUTPATIENT)
Dept: CARDIOLOGY | Facility: MEDICAL CENTER | Age: 54
End: 2019-11-11
Payer: MEDICARE

## 2019-11-11 VITALS
OXYGEN SATURATION: 96 % | HEART RATE: 66 BPM | DIASTOLIC BLOOD PRESSURE: 62 MMHG | HEIGHT: 77 IN | BODY MASS INDEX: 34 KG/M2 | SYSTOLIC BLOOD PRESSURE: 110 MMHG | WEIGHT: 288 LBS

## 2019-11-11 DIAGNOSIS — F17.200 SMOKING: ICD-10-CM

## 2019-11-11 DIAGNOSIS — E78.2 MIXED HYPERLIPIDEMIA: ICD-10-CM

## 2019-11-11 DIAGNOSIS — I25.10 CORONARY ARTERY DISEASE INVOLVING NATIVE CORONARY ARTERY OF NATIVE HEART WITHOUT ANGINA PECTORIS: ICD-10-CM

## 2019-11-11 DIAGNOSIS — I25.5 ISCHEMIC CARDIOMYOPATHY: ICD-10-CM

## 2019-11-11 DIAGNOSIS — I50.9 HEART FAILURE, NYHA CLASS 2 (HCC): ICD-10-CM

## 2019-11-11 DIAGNOSIS — R06.09 DYSPNEA ON EXERTION: ICD-10-CM

## 2019-11-11 DIAGNOSIS — I10 HTN (HYPERTENSION), MALIGNANT: ICD-10-CM

## 2019-11-11 DIAGNOSIS — I48.0 PAROXYSMAL ATRIAL FIBRILLATION (HCC): ICD-10-CM

## 2019-11-11 DIAGNOSIS — Z79.899 HIGH RISK MEDICATION USE: ICD-10-CM

## 2019-11-11 DIAGNOSIS — I50.20 ACC/AHA STAGE C SYSTOLIC HEART FAILURE (HCC): ICD-10-CM

## 2019-11-11 PROCEDURE — 93000 ELECTROCARDIOGRAM COMPLETE: CPT | Mod: 59 | Performed by: INTERNAL MEDICINE

## 2019-11-11 PROCEDURE — 94618 PULMONARY STRESS TESTING: CPT | Mod: GZ | Performed by: INTERNAL MEDICINE

## 2019-11-11 PROCEDURE — 99214 OFFICE O/P EST MOD 30 MIN: CPT | Mod: 25 | Performed by: INTERNAL MEDICINE

## 2019-11-11 RX ORDER — METOPROLOL SUCCINATE 25 MG/1
25 TABLET, EXTENDED RELEASE ORAL EVERY MORNING
Qty: 90 TAB | Refills: 3 | Status: ON HOLD | OUTPATIENT
Start: 2019-11-11 | End: 2020-12-08

## 2019-11-11 RX ORDER — POTASSIUM CHLORIDE 750 MG/1
10 TABLET, EXTENDED RELEASE ORAL EVERY MORNING
Qty: 90 EACH | Refills: 3 | Status: ON HOLD | OUTPATIENT
Start: 2019-11-11 | End: 2020-11-16

## 2019-11-11 RX ORDER — FUROSEMIDE 40 MG/1
40 TABLET ORAL EVERY MORNING
Qty: 90 TAB | Refills: 3 | Status: ON HOLD | OUTPATIENT
Start: 2019-11-11 | End: 2020-11-16

## 2019-11-11 RX ORDER — AMIODARONE HYDROCHLORIDE 200 MG/1
200 TABLET ORAL EVERY MORNING
Qty: 90 TAB | Refills: 3 | Status: SHIPPED | OUTPATIENT
Start: 2019-11-11

## 2019-11-11 RX ORDER — LISINOPRIL 2.5 MG/1
2.5 TABLET ORAL EVERY MORNING
Qty: 90 TAB | Refills: 3 | Status: SHIPPED | OUTPATIENT
Start: 2019-11-11

## 2019-11-11 RX ORDER — ROSUVASTATIN CALCIUM 40 MG/1
40 TABLET, COATED ORAL EVERY MORNING
Qty: 90 TAB | Refills: 3 | Status: SHIPPED | OUTPATIENT
Start: 2019-11-11

## 2019-11-11 RX ORDER — SPIRONOLACTONE 25 MG/1
25 TABLET ORAL EVERY MORNING
Qty: 90 TAB | Refills: 3 | Status: ON HOLD | OUTPATIENT
Start: 2019-11-11 | End: 2020-12-08

## 2019-11-11 ASSESSMENT — ENCOUNTER SYMPTOMS
VOMITING: 0
HEADACHES: 0
PALPITATIONS: 0
CHILLS: 0
DOUBLE VISION: 0
HALLUCINATIONS: 0
ORTHOPNEA: 0
EYE DISCHARGE: 0
BRUISES/BLEEDS EASILY: 0
LOSS OF CONSCIOUSNESS: 0
DIZZINESS: 0
NAUSEA: 0
PND: 0
FEVER: 0
SENSORY CHANGE: 0
DEPRESSION: 0
SHORTNESS OF BREATH: 0
SPEECH CHANGE: 0
MYALGIAS: 0
FALLS: 0
WEIGHT LOSS: 0
ABDOMINAL PAIN: 0
BLOOD IN STOOL: 0
EYE PAIN: 0
CLAUDICATION: 0
COUGH: 0
BLURRED VISION: 0

## 2019-11-11 NOTE — PROGRESS NOTES
Chief Complaint   Patient presents with   • CHF (Acute)     HFE       Subjective:   Joshua Medrano is a 53 y.o. male who presents today for cardiac care and evaluation and management for ischemic cardiomyopathy. Patient does have a history of ischemic cardiomyopathy for which he underwent CABG x 2 (LIMA to LAD and SVG to OM) in December 2017. His left ventricular systolic function was found to be 25% but improved to 55% in 11/2018.     No dyspnea. No complaints. Feeling well overall. Does not want to be tested for SHASHANK.    I have personally interpreted her EKG today with patient, there is no evidence of acute coronary syndrome, no evidence of prior infarct, normal CO and QT interval, no significant conduction disease.     04/2018 Patient was able to complete 146 m during his 6 minute walk test. his O2 saturation at baseline was 94% and at the end of the test, the O2 saturation was 99%. he reported 2 level of dyspnea on Elsa scale.    Past Medical History:   Diagnosis Date   • ASTHMA    • Atrial fibrillation (HCC)    • CAD (coronary artery disease)    • Chronic obstructive pulmonary disease (HCC)    • Congestive heart failure (HCC)    • Diabetes      Past Surgical History:   Procedure Laterality Date   • COLONOSCOPY - ENDO N/A 7/25/2018    Procedure: COLONOSCOPY - ENDO;  Surgeon: Josiah Molina D.O.;  Location: ENDOSCOPY [facility];  Service: Gastroenterology   • THORACOSCOPY Left 1/25/2018    Procedure: THORACOSCOPY- PLEURODESIS ;  Surgeon: Chandu Paez M.D.;  Location: SURGERY [facility];  Service: General   • MULTIPLE CORONARY ARTERY BYPASS ENDO VEIN HARVEST  12/22/2017    Procedure: MULTIPLE CORONARY ARTERY BYPASS ENDO VEIN HARVEST X2;  Surgeon: Kiel Ash M.D.;  Location: SURGERY [facility];  Service: Cardiothoracic   • JIM  12/22/2017    Procedure: JIM;  Surgeon: Kiel Ash M.D.;  Location: SURGERY [facility];  Service: Cardiothoracic   • OTHER ABDOMINAL SURGERY       Family  History   Problem Relation Age of Onset   • Diabetes Mother    • Stroke Mother    • Leukemia Mother    • Diabetes Father    • No Known Problems Sister    • Heart Disease Brother         PPM   • Lung Disease Brother    • Alcohol/Drug Brother    • Diabetes Sister      Social History     Socioeconomic History   • Marital status:      Spouse name: Not on file   • Number of children: Not on file   • Years of education: Not on file   • Highest education level: Not on file   Occupational History   • Not on file   Social Needs   • Financial resource strain: Not on file   • Food insecurity:     Worry: Not on file     Inability: Not on file   • Transportation needs:     Medical: Not on file     Non-medical: Not on file   Tobacco Use   • Smoking status: Former Smoker     Packs/day: 0.00     Years: 30.00     Pack years: 0.00     Types: Cigars, Cigarettes     Last attempt to quit: 2017     Years since quittin.1   • Smokeless tobacco: Never Used   Substance and Sexual Activity   • Alcohol use: No   • Drug use: No   • Sexual activity: Not on file   Lifestyle   • Physical activity:     Days per week: Not on file     Minutes per session: Not on file   • Stress: Not on file   Relationships   • Social connections:     Talks on phone: Not on file     Gets together: Not on file     Attends Mu-ism service: Not on file     Active member of club or organization: Not on file     Attends meetings of clubs or organizations: Not on file     Relationship status: Not on file   • Intimate partner violence:     Fear of current or ex partner: Not on file     Emotionally abused: Not on file     Physically abused: Not on file     Forced sexual activity: Not on file   Other Topics Concern   • Not on file   Social History Narrative   • Not on file     Allergies   Allergen Reactions   • Cillins [Penicillins] Anaphylaxis and Rash     As a child; tolerated cefepime (2017), ceftriaxone (2018), cefazolin (2017)   • Erythromycin  Anaphylaxis     Rxn - 1994   • Shellfish Allergy Anaphylaxis     Pt stated that he is allergic to all seafood, will develop a rash and says that rash will clear up with benadryl   • Metoprolol Unspecified     Pt stated got latham and aggressive, in demi dose's per wife.        Outpatient Encounter Medications as of 11/11/2019   Medication Sig Dispense Refill   • enoxaparin (LOVENOX) 120 MG/0.8ML Solution inj Inject 120 mg as instructed every 12 hours. 15 Syringe 1   • acetaminophen (TYLENOL) 325 MG Tab Take 2 Tabs by mouth every 6 hours as needed (Mild Pain; (Pain scale 1-3); Temp greater than 100.5 F). 30 Tab 0   • albuterol 108 (90 Base) MCG/ACT Aero Soln inhalation aerosol Inhale 2 Puffs by mouth every 6 hours as needed for Shortness of Breath.     • amiodarone (CORDARONE) 200 MG Tab Take 200 mg by mouth every morning.     • ferrous sulfate 325 (65 Fe) MG tablet Take 325 mg by mouth every morning.     • furosemide (LASIX) 40 MG Tab Take 40 mg by mouth every morning.     • lisinopril (PRINIVIL) 2.5 MG Tab Take 2.5 mg by mouth every morning.     • metoprolol SR (TOPROL XL) 25 MG TABLET SR 24 HR Take 25 mg by mouth every morning.     • polyethylene glycol/lytes (MIRALAX) Pack Take 17 g by mouth every morning.     • potassium chloride SA (K-DUR) 10 MEQ Tab CR Take 10 mEq by mouth every morning.     • rosuvastatin (CRESTOR) 40 MG tablet Take 40 mg by mouth every morning.     • spironolactone (ALDACTONE) 25 MG Tab Take 25 mg by mouth every morning.     • tiotropium (SPIRIVA) 18 MCG Cap Inhale 18 mcg by mouth every morning.     • warfarin (COUMADIN) 5 MG Tab Take 7.5 mg by mouth every morning.     • ipratropium-albuterol (DUONEB) 0.5-2.5 (3) MG/3ML nebulizer solution 3 mL by Nebulization route every 6 hours as needed for Shortness of Breath. 180 Bullet 10     No facility-administered encounter medications on file as of 11/11/2019.      Review of Systems   Constitutional: Negative for chills, fever, malaise/fatigue and  "weight loss.   HENT: Negative for ear discharge, ear pain, hearing loss and nosebleeds.    Eyes: Negative for blurred vision, double vision, pain and discharge.   Respiratory: Negative for cough and shortness of breath.    Cardiovascular: Negative for chest pain, palpitations, orthopnea, claudication, leg swelling and PND.   Gastrointestinal: Negative for abdominal pain, blood in stool, melena, nausea and vomiting.   Genitourinary: Negative for dysuria and hematuria.   Musculoskeletal: Negative for falls, joint pain and myalgias.   Skin: Negative for itching and rash.   Neurological: Negative for dizziness, sensory change, speech change, loss of consciousness and headaches.   Endo/Heme/Allergies: Negative for environmental allergies. Does not bruise/bleed easily.   Psychiatric/Behavioral: Negative for depression, hallucinations and suicidal ideas.        Objective:   /62 (BP Location: Right arm, Patient Position: Sitting, BP Cuff Size: Adult)   Pulse 66   Ht 1.956 m (6' 5\")   Wt (!) 130.6 kg (288 lb)   SpO2 96%   BMI 34.15 kg/m²     Physical Exam   Constitutional: He is oriented to person, place, and time. No distress.   HENT:   Head: Normocephalic and atraumatic.   Right Ear: External ear normal.   Left Ear: External ear normal.   Eyes: Right eye exhibits no discharge. Left eye exhibits no discharge.   Neck: No JVD present. No thyromegaly present.   Cardiovascular: Normal rate, regular rhythm, normal heart sounds and intact distal pulses. Exam reveals no gallop and no friction rub.   No murmur heard.  Pulmonary/Chest: Breath sounds normal. No respiratory distress.   Abdominal: Bowel sounds are normal. He exhibits no distension. There is no tenderness.   Musculoskeletal:         General: No tenderness or edema.   Neurological: He is alert and oriented to person, place, and time. No cranial nerve deficit.   Skin: Skin is warm and dry. He is not diaphoretic.   Psychiatric: He has a normal mood and affect. " His behavior is normal.   Nursing note and vitals reviewed.      Assessment:     1. ACC/AHA stage C systolic heart failure (HCC)     2. Heart failure, NYHA class 2 (HCC)     3. Paroxysmal atrial fibrillation (HCC)  EKG   4. Ischemic cardiomyopathy     5. Coronary artery disease involving native coronary artery of native heart without angina pectoris     6. Mixed hyperlipidemia     7. HTN (hypertension), malignant     8. Smoking     9. Dyspnea on exertion     10. High risk medication use         Medical Decision Making:  Today's Assessment / Status / Plan:   Today, based on physical examination findings, patient is euvolemic. No JVD, lungs are clear to auscultation, no pitting edema in bilateral lower extremities, no ascites. Dry weight is 288 lbs.     Continue current medications at current dose as above. Refills were prescribed today and patient was instructed with plan of care.     No indication for ICD. LVEF is now 55%.     I spent 5 minutes talking to patient about the danger of smoking. I advised patient and counseled patient on smoking cessation. Patient has promised to achieve goal of zero cigarettes per day.        I will see patient back in clinic with lab tests and studies results in 6 months.     I thank you Dr. Le for referring patient to our Cardiology Clinic today.

## 2019-11-13 ENCOUNTER — ANTICOAGULATION VISIT (OUTPATIENT)
Dept: VASCULAR LAB | Facility: MEDICAL CENTER | Age: 54
End: 2019-11-13
Attending: INTERNAL MEDICINE
Payer: MEDICARE

## 2019-11-13 DIAGNOSIS — I48.0 PAROXYSMAL ATRIAL FIBRILLATION (HCC): ICD-10-CM

## 2019-11-13 LAB
EKG IMPRESSION: NORMAL
INR PPP: 1.7 (ref 2–3.5)

## 2019-11-13 PROCEDURE — 99212 OFFICE O/P EST SF 10 MIN: CPT

## 2019-11-13 PROCEDURE — 85610 PROTHROMBIN TIME: CPT

## 2019-11-13 NOTE — PROGRESS NOTES
Anticoagulation Summary  As of 2019    INR goal:   2.0-3.0   TTR:   42.5 % (1.2 y)   INR used for dosin.70! (2019)   Warfarin maintenance plan:   5 mg (5 mg x 1) every Mon, Wed, Fri; 7.5 mg (5 mg x 1.5) all other days   Weekly warfarin total:   45 mg   Plan last modified:   Wandy Garzon, PharmD (2019)   Next INR check:   2019   Target end date:   Indefinite    Indications    Paroxysmal atrial fibrillation (HCC) [I48.0]  Atrial flutter with rapid ventricular response (HCC) (Resolved) [I48.92]             Anticoagulation Episode Summary     INR check location:       Preferred lab:       Send INR reminders to:       Comments:   Jia KIM      Anticoagulation Care Providers     Provider Role Specialty Phone number    Jett Bedoya M.D. Referring Cardiology [phone]    [facility] Responsible  [phone]        Anticoagulation Patient Findings      HPI:  Joshua Medrano seen in clinic today, on anticoagulation therapy with warfarin for PAF.   Changes to current medical/health status since last appt: none  Denies signs/symptoms of bleeding and/or thrombosis since the last appt.    Denies any interval changes to diet  Denies any interval changes to medications since last appt.   Denies any complications or cost restrictions with current therapy.   Declines vitals.  Confirmed dosing regimen.     A/P   INR  SUB-therapeutic.   Bolus 10 mg today then Pt is to continue with current warfarin dosing regimen.     Follow up appointment in 2 week(s).    Noel Vegas, PharmD

## 2019-11-18 RX ORDER — WARFARIN SODIUM 5 MG/1
TABLET ORAL
Qty: 135 TAB | Refills: 1 | Status: SHIPPED | OUTPATIENT
Start: 2019-11-18 | End: 2019-12-19 | Stop reason: SDUPTHER

## 2019-11-26 ENCOUNTER — ANTICOAGULATION MONITORING (OUTPATIENT)
Dept: VASCULAR LAB | Facility: MEDICAL CENTER | Age: 54
End: 2019-11-26

## 2019-11-26 DIAGNOSIS — I48.0 PAROXYSMAL ATRIAL FIBRILLATION (HCC): ICD-10-CM

## 2019-11-26 LAB — INR PPP: 3.7 (ref 2–3.5)

## 2019-11-26 NOTE — PROGRESS NOTES
Anticoagulation Summary  As of 11/26/2019    INR goal:   2.0-3.0   TTR:   42.7 % (1.3 y)   INR used for dosing:   3.70! (11/26/2019)   Warfarin maintenance plan:   5 mg (5 mg x 1) every Mon, Wed, Fri; 7.5 mg (5 mg x 1.5) all other days   Weekly warfarin total:   45 mg   Plan last modified:   Zak CanD (9/20/2019)   Next INR check:      Target end date:   Indefinite    Indications    Paroxysmal atrial fibrillation (HCC) [I48.0]  Atrial flutter with rapid ventricular response (HCC) (Resolved) [I48.92]             Anticoagulation Episode Summary     INR check location:       Preferred lab:       Send INR reminders to:       Comments:   Jia KIM      Anticoagulation Care Providers     Provider Role Specialty Phone number    Jett Bedoya M.D. Referring Cardiology [phone]    [facility] Responsible  [phone]        Anticoagulation Patient Findings      Spoke with pt.  INR is supra therapeutic.   Pt denies any unusual s/s of bleeding, bruising, clotting or any changes to diet or medications. Denies any etoh, supplements, or illness.   Pt verifies warfarin weekly dosing.     Will have pt hold x 1 day, then continue regimen    Repeat INR in 2 week(s).     Magali Vazquez, PharmD

## 2019-11-27 ENCOUNTER — APPOINTMENT (OUTPATIENT)
Dept: VASCULAR LAB | Facility: MEDICAL CENTER | Age: 54
End: 2019-11-27
Attending: INTERNAL MEDICINE
Payer: MEDICARE

## 2019-12-19 ENCOUNTER — ANTICOAGULATION VISIT (OUTPATIENT)
Dept: VASCULAR LAB | Facility: MEDICAL CENTER | Age: 54
End: 2019-12-19
Attending: INTERNAL MEDICINE
Payer: MEDICARE

## 2019-12-19 ENCOUNTER — ANTICOAGULATION MONITORING (OUTPATIENT)
Dept: VASCULAR LAB | Facility: MEDICAL CENTER | Age: 54
End: 2019-12-19

## 2019-12-19 ENCOUNTER — HOSPITAL ENCOUNTER (OUTPATIENT)
Dept: LAB | Facility: MEDICAL CENTER | Age: 54
End: 2019-12-19
Attending: INTERNAL MEDICINE
Payer: MEDICARE

## 2019-12-19 DIAGNOSIS — I48.0 PAROXYSMAL ATRIAL FIBRILLATION (HCC): ICD-10-CM

## 2019-12-19 DIAGNOSIS — Z79.01 CHRONIC ANTICOAGULATION: ICD-10-CM

## 2019-12-19 LAB
ERYTHROCYTE [DISTWIDTH] IN BLOOD BY AUTOMATED COUNT: 58.6 FL (ref 35.9–50)
HCT VFR BLD AUTO: 64.9 % (ref 42–52)
HGB BLD-MCNC: 20.1 G/DL (ref 14–18)
INR PPP: 3.57 (ref 0.87–1.13)
MCH RBC QN AUTO: 25.9 PG (ref 27–33)
MCHC RBC AUTO-ENTMCNC: 30.5 G/DL (ref 33.7–35.3)
MCV RBC AUTO: 85.1 FL (ref 81.4–97.8)
PROTHROMBIN TIME: 37.1 SEC (ref 12–14.6)
RBC # BLD AUTO: 7.71 M/UL (ref 4.7–6.1)
WBC # BLD AUTO: 21.4 K/UL (ref 4.8–10.8)

## 2019-12-19 PROCEDURE — 85048 AUTOMATED LEUKOCYTE COUNT: CPT

## 2019-12-19 PROCEDURE — 36415 COLL VENOUS BLD VENIPUNCTURE: CPT

## 2019-12-19 PROCEDURE — 99211 OFF/OP EST MAY X REQ PHY/QHP: CPT | Performed by: PHARMACIST

## 2019-12-19 PROCEDURE — 85018 HEMOGLOBIN: CPT

## 2019-12-19 PROCEDURE — 85041 AUTOMATED RBC COUNT: CPT

## 2019-12-19 PROCEDURE — 85014 HEMATOCRIT: CPT

## 2019-12-19 PROCEDURE — 85610 PROTHROMBIN TIME: CPT

## 2019-12-19 RX ORDER — WARFARIN SODIUM 5 MG/1
TABLET ORAL
Qty: 135 TAB | Refills: 2 | Status: SHIPPED | OUTPATIENT
Start: 2019-12-19 | End: 2020-08-12 | Stop reason: SDUPTHER

## 2019-12-19 NOTE — PROGRESS NOTES
Anticoagulation Summary  As of 12/19/2019    INR goal:   2.0-3.0   TTR:   42.7 % (1.3 y)   INR used for dosing:      Warfarin maintenance plan:   5 mg (5 mg x 1) every Mon, Wed, Fri; 7.5 mg (5 mg x 1.5) all other days   Weekly warfarin total:   45 mg   Plan last modified:   Wandy Garzon, PharmD (9/20/2019)   Next INR check:      Target end date:   Indefinite    Indications    Paroxysmal atrial fibrillation (HCC) [I48.0]  Atrial flutter with rapid ventricular response (HCC) (Resolved) [I48.92]             Anticoagulation Episode Summary     INR check location:       Preferred lab:       Send INR reminders to:       Comments:   Jia KIM      Anticoagulation Care Providers     Provider Role Specialty Phone number    Jett Bedoya M.D. Referring Cardiology [phone]    [facility]ticoagulation [facility] Responsible  [phone]                Anticoagulation Patient Findings      HPI:  Joshua Medrano seen in clinic today, on anticoagulation therapy with warfarin for Afib  Changes to current medical/health status since last appt: denies  Denies signs/symptoms of bleeding and/or thrombosis since the last appt.    Denies any interval changes to diet  Denies any interval changes to medications since last appt.   Denies any complications or cost restrictions with current therapy.   BP declines      A/P   Unable to draw POCT INR in clinic, sent pt to lab.   Placed CBC and INR order to be drawn today.   No changes, no issues. Will call pt with results and set up f/u appropriately    Wandy Garzon, PharmD       ADDENDUM:  PT HAS A HISTORY OF POLYCYTHEMIA, Bhavin Petersen and Lexy Thompson notified in the lab.  CBC ordered, will walk patient to [address].

## 2019-12-20 NOTE — PROGRESS NOTES
Anticoagulation Summary  As of 12/19/2019    INR goal:   2.0-3.0   TTR:   40.6 % (1.3 y)   INR used for dosing:   3.57! (12/19/2019)   Warfarin maintenance plan:   7.5 mg (5 mg x 1.5) every Sun, Tue, Thu; 5 mg (5 mg x 1) all other days   Weekly warfarin total:   42.5 mg   Plan last modified:   Wandy Garzon PharmD (12/19/2019)   Next INR check:   1/9/2020   Target end date:   Indefinite    Indications    Paroxysmal atrial fibrillation (HCC) [I48.0]  Atrial flutter with rapid ventricular response (HCC) (Resolved) [I48.92]             Anticoagulation Episode Summary     INR check location:       Preferred lab:       Send INR reminders to:       Comments:   Jia KIM      Anticoagulation Care Providers     Provider Role Specialty Phone number    Jett Bedoya M.D. Referring Cardiology [phone]    [facility] Responsible  [phone]        Anticoagulation Patient Findings      Spoke with patient.  INR is SUPRA therapeutic.   Pt denies any unusual s/s of bleeding, bruising, clotting or any changes to diet or medications. Denies any etoh, cranberries, supplements, or illness.   Pt verifies warfarin weekly dosing.     Will have pt take a reduced warfarin dose today and then start a new decreased weekly warfarin dose.    Repeat INR in 3 week(s). Pt will return to [address] lab where the tubs are already ready for pt. CBC with next INR.     Wandy Garzon, ZakD

## 2020-01-09 ENCOUNTER — TELEPHONE (OUTPATIENT)
Dept: VASCULAR LAB | Facility: MEDICAL CENTER | Age: 55
End: 2020-01-09

## 2020-01-09 ENCOUNTER — HOSPITAL ENCOUNTER (OUTPATIENT)
Dept: LAB | Facility: MEDICAL CENTER | Age: 55
End: 2020-01-09
Attending: INTERNAL MEDICINE
Payer: MEDICARE

## 2020-01-09 DIAGNOSIS — Z79.01 CHRONIC ANTICOAGULATION: ICD-10-CM

## 2020-01-09 LAB
ERYTHROCYTE [DISTWIDTH] IN BLOOD BY AUTOMATED COUNT: 58.4 FL (ref 35.9–50)
HCT VFR BLD AUTO: 64 % (ref 42–52)
HGB BLD-MCNC: 20.2 G/DL (ref 14–18)
INR PPP: 1.98 (ref 0.87–1.13)
MCH RBC QN AUTO: 26.2 PG (ref 27–33)
MCHC RBC AUTO-ENTMCNC: 31.6 G/DL (ref 33.7–35.3)
MCV RBC AUTO: 83 FL (ref 81.4–97.8)
PROTHROMBIN TIME: 23.2 SEC (ref 12–14.6)
RBC # BLD AUTO: 7.71 M/UL (ref 4.7–6.1)
WBC # BLD AUTO: 18.1 K/UL (ref 4.8–10.8)

## 2020-01-09 PROCEDURE — 85018 HEMOGLOBIN: CPT

## 2020-01-09 PROCEDURE — 36415 COLL VENOUS BLD VENIPUNCTURE: CPT

## 2020-01-09 PROCEDURE — 85014 HEMATOCRIT: CPT

## 2020-01-09 PROCEDURE — 85048 AUTOMATED LEUKOCYTE COUNT: CPT

## 2020-01-09 PROCEDURE — 85041 AUTOMATED RBC COUNT: CPT

## 2020-01-09 PROCEDURE — 85610 PROTHROMBIN TIME: CPT

## 2020-01-10 ENCOUNTER — ANTICOAGULATION MONITORING (OUTPATIENT)
Dept: MEDICAL GROUP | Facility: PHYSICIAN GROUP | Age: 55
End: 2020-01-10

## 2020-01-10 DIAGNOSIS — I48.0 PAROXYSMAL ATRIAL FIBRILLATION (HCC): ICD-10-CM

## 2020-01-10 NOTE — TELEPHONE ENCOUNTER
Hgb quite high on blood work  Will have pharmacist ask patient to contact pcp to discuss as this lies outside our realm of expertise.      Michael Bloch, MD  Anticoagulation Clinic    Cc:  SUSI Le

## 2020-01-10 NOTE — PROGRESS NOTES
Anticoagulation Summary  As of 1/10/2020    INR goal:   2.0-3.0   TTR:   41.6 % (1.4 y)   INR used for dosin.98! (2020)   Warfarin maintenance plan:   7.5 mg (5 mg x 1.5) every Sun, Tue, Thu; 5 mg (5 mg x 1) all other days   Weekly warfarin total:   42.5 mg   Plan last modified:   Rafi Chaudhary, PharmD (1/10/2020)   Next INR check:   2020   Target end date:   Indefinite    Indications    Paroxysmal atrial fibrillation (HCC) [I48.0]  Atrial flutter with rapid ventricular response (HCC) (Resolved) [I48.92]             Anticoagulation Episode Summary     INR check location:       Preferred lab:       Send INR reminders to:       Comments:   Jia KIM      Anticoagulation Care Providers     Provider Role Specialty Phone number    Jett Bedoya M.D. Referring Cardiology [phone]    [facility] Anticoagulation Services Responsible  [phone]        Anticoagulation Patient Findings    INR  sub-therapeutic.   Left a voice message for the patient, asked patient to please call the anticoagulation clinic if they have any signs/symptoms of bleeding and/or thrombosis or any changes to diet or medications.    Follow up appointment in 2 week(s)    Lab Results   Component Value Date/Time    WBC 18.1 (H) 2020 10:07 AM    RBC 7.71 (H) 2020 10:07 AM    HEMOGLOBIN 20.2 (H) 2020 10:07 AM    HEMATOCRIT 64.0 (H) 2020 10:07 AM    MCV 83.0 2020 10:07 AM    MCH 26.2 (L) 2020 10:07 AM    MCHC 31.6 (L) 2020 10:07 AM    MPV 10.0 2019 10:56 AM    NEUTSPOLYS 87.00 (H) 2019 10:56 AM    LYMPHOCYTES 2.90 (L) 2019 10:56 AM    MONOCYTES 5.80 2019 10:56 AM    EOSINOPHILS 2.10 2019 10:56 AM    EOSINOPHILS 2 2018 09:34 AM    BASOPHILS 0.60 2019 10:56 AM    HYPOCHROMIA 1+ 09/10/2019 08:09 PM    ANISOCYTOSIS 2+ 09/10/2019 08:09 PM          lso informed the patient that his hemoglobin and hematocrit were very high at 20/64.  Dr. BlocH has already sent  a note to his primary care physician but I reiterated to the patient that he needs to call and discuss this lab work with his primary care ASAP.    Continue weekly warfarin dose as noted as it is near 2      Rafi Chaudhary, PharmD, MS, BCACP, [facility]    This note was created using voice recognition software (Dragon). The accuracy of the dictation is limited by the abilities of the software. I have reviewed the note prior to signing, however some errors in grammar and context are still possible. If you have any questions related to this note please do not hesitate to contact our office.

## 2020-01-21 ENCOUNTER — HOSPITAL ENCOUNTER (OUTPATIENT)
Dept: LAB | Facility: MEDICAL CENTER | Age: 55
End: 2020-01-21
Attending: STUDENT IN AN ORGANIZED HEALTH CARE EDUCATION/TRAINING PROGRAM
Payer: MEDICARE

## 2020-01-21 LAB
ALBUMIN SERPL BCP-MCNC: 4.1 G/DL (ref 3.2–4.9)
ALBUMIN/GLOB SERPL: 0.8 G/DL
ALP SERPL-CCNC: 130 U/L (ref 30–99)
ALT SERPL-CCNC: 13 U/L (ref 2–50)
ANION GAP SERPL CALC-SCNC: 11 MMOL/L (ref 0–11.9)
AST SERPL-CCNC: 17 U/L (ref 12–45)
BASOPHILS # BLD AUTO: 1.4 % (ref 0–1.8)
BASOPHILS # BLD: 0.29 K/UL (ref 0–0.12)
BILIRUB SERPL-MCNC: 0.7 MG/DL (ref 0.1–1.5)
BUN SERPL-MCNC: 25 MG/DL (ref 8–22)
CALCIUM SERPL-MCNC: 9.8 MG/DL (ref 8.5–10.5)
CHLORIDE SERPL-SCNC: 96 MMOL/L (ref 96–112)
CO2 SERPL-SCNC: 25 MMOL/L (ref 20–33)
CREAT SERPL-MCNC: 1.38 MG/DL (ref 0.5–1.4)
EOSINOPHIL # BLD AUTO: 0.3 K/UL (ref 0–0.51)
EOSINOPHIL NFR BLD: 1.5 % (ref 0–6.9)
ERYTHROCYTE [DISTWIDTH] IN BLOOD BY AUTOMATED COUNT: 61.2 FL (ref 35.9–50)
GLOBULIN SER CALC-MCNC: 4.9 G/DL (ref 1.9–3.5)
GLUCOSE SERPL-MCNC: 106 MG/DL (ref 65–99)
HCT VFR BLD AUTO: 65.9 % (ref 42–52)
HGB BLD-MCNC: 20.9 G/DL (ref 14–18)
IMM GRANULOCYTES # BLD AUTO: 0.3 K/UL (ref 0–0.11)
IMM GRANULOCYTES NFR BLD AUTO: 1.5 % (ref 0–0.9)
LYMPHOCYTES # BLD AUTO: 1.02 K/UL (ref 1–4.8)
LYMPHOCYTES NFR BLD: 5 % (ref 22–41)
MCH RBC QN AUTO: 25.8 PG (ref 27–33)
MCHC RBC AUTO-ENTMCNC: 30.8 G/DL (ref 33.7–35.3)
MCV RBC AUTO: 83.2 FL (ref 81.4–97.8)
MONOCYTES # BLD AUTO: 1 K/UL (ref 0–0.85)
MONOCYTES NFR BLD AUTO: 4.9 % (ref 0–13.4)
NEUTROPHILS # BLD AUTO: 17.55 K/UL (ref 1.82–7.42)
NEUTROPHILS NFR BLD: 85.7 % (ref 44–72)
NRBC # BLD AUTO: 0 K/UL
NRBC BLD-RTO: 0 /100 WBC
PLATELET # BLD AUTO: 229 K/UL (ref 164–446)
PMV BLD AUTO: 10.2 FL (ref 9–12.9)
POTASSIUM SERPL-SCNC: 4 MMOL/L (ref 3.6–5.5)
PROT SERPL-MCNC: 9 G/DL (ref 6–8.2)
RBC # BLD AUTO: 8.1 M/UL (ref 4.7–6.1)
SODIUM SERPL-SCNC: 132 MMOL/L (ref 135–145)
WBC # BLD AUTO: 20.5 K/UL (ref 4.8–10.8)

## 2020-01-21 PROCEDURE — 36415 COLL VENOUS BLD VENIPUNCTURE: CPT

## 2020-01-21 PROCEDURE — 80053 COMPREHEN METABOLIC PANEL: CPT

## 2020-01-21 PROCEDURE — 85025 COMPLETE CBC W/AUTO DIFF WBC: CPT

## 2020-02-14 ENCOUNTER — ANTICOAGULATION MONITORING (OUTPATIENT)
Dept: VASCULAR LAB | Facility: MEDICAL CENTER | Age: 55
End: 2020-02-14

## 2020-02-14 DIAGNOSIS — I48.0 PAROXYSMAL ATRIAL FIBRILLATION (HCC): ICD-10-CM

## 2020-02-14 LAB — INR PPP: 2.5 (ref 2–3.5)

## 2020-02-14 NOTE — PROGRESS NOTES
Anticoagulation Summary  As of 2020    INR goal:   2.0-3.0   TTR:   45.1 % (1.5 y)   INR used for dosin.50 (2020)   Warfarin maintenance plan:   7.5 mg (5 mg x 1.5) every Sun, Tue, u; 5 mg (5 mg x 1) all other days   Weekly warfarin total:   42.5 mg   Plan last modified:   Rafi Chaudhary PharmD (1/10/2020)   Next INR check:   2020   Target end date:   Indefinite    Indications    Paroxysmal atrial fibrillation (HCC) [I48.0]  Atrial flutter with rapid ventricular response (HCC) (Resolved) [I48.92]             Anticoagulation Episode Summary     INR check location:       Preferred lab:       Send INR reminders to:       Comments:   Jia KIM      Anticoagulation Care Providers     Provider Role Specialty Phone number    ElsaChristianneritika LISSA Bedoya Referring Cardiology [phone]    [facility] Anticoagulation Services Responsible  [phone]        Anticoagulation Patient Findings        Spoke to patient on the phone.   INR  therapeutic.   Denies signs/symptoms of bleeding and/or thrombosis.   Denies changes to diet or medications.   Follow up appointment in 2 week(s).    Continue weekly warfarin dose as noted      Rafi Chaudhary, [place], MS, BCACP, [facility]    This note was created using voice recognition software (Dragon). The accuracy of the dictation is limited by the abilities of the software. I have reviewed the note prior to signing, however some errors in grammar and context are still possible. If you have any questions related to this note please do not hesitate to contact our office.

## 2020-03-12 ENCOUNTER — TELEPHONE (OUTPATIENT)
Dept: VASCULAR LAB | Facility: MEDICAL CENTER | Age: 55
End: 2020-03-12

## 2020-03-27 ENCOUNTER — TELEPHONE (OUTPATIENT)
Dept: VASCULAR LAB | Facility: MEDICAL CENTER | Age: 55
End: 2020-03-27

## 2020-03-27 NOTE — TELEPHONE ENCOUNTER
Left message for pt to have INR checked  2nd call  Briana Roberson, Clinical Pharmacist, CDE, CACP

## 2020-04-23 ENCOUNTER — TELEPHONE (OUTPATIENT)
Dept: VASCULAR LAB | Facility: MEDICAL CENTER | Age: 55
End: 2020-04-23

## 2020-04-23 NOTE — TELEPHONE ENCOUNTER
Left message for pt to have INR checked  3rd call  Letter sent  Briana Roberson, Clinical Pharmacist, CDE, CACP

## 2020-04-23 NOTE — LETTER
April 23, 2020          Joshua Medrano  [address] [address]        Dear Joshua Medrano ,    We have been unsuccessful in our attempts to contact you regarding your Anticoagulation Service appointments. Warfarin is a potent blood-thinning agent that requires monitoring to ensure that the dosage is correct for your body.  If it isn't, you could develop serious, sometimes life-threatening bleeding problems or life-threatening blood clots or stroke could result.     It is extremely important that you have your lab work drawn as soon as possible.  We are open Monday-Friday 8 am until 5 pm.  You may reach our Service at [phone].     To monitor you effectively, we need to be able to communicate with you.  This is a requirement to be followed by our Service.  If we are unable to contact you on three separate occasions, you will be discharged from the Anticoagulation Service.     If you repeatedly fail to keep your lab appointments, you are at risk of being discharged from the Service.           Sincerely,           Devin Santana, PharmD, [facility]  Pharmacy Clinical Supervisor  [facility]  Outpatient Anticoagulation Service

## 2020-05-01 NOTE — PROGRESS NOTES
Received report on patient. He is sleeping in bed, has no indications of pain or distress. Bed in the lowest position and call light and personal belongings within reach.   done

## 2020-05-29 ENCOUNTER — TELEPHONE (OUTPATIENT)
Dept: VASCULAR LAB | Facility: MEDICAL CENTER | Age: 55
End: 2020-05-29

## 2020-05-29 NOTE — LETTER
May 29, 2020        Joshua Medrano  [address] [address]        Dear Joshua Medrano ,    We have been unsuccessful in our attempts to contact you regarding your Anticoagulation Service appointments. Warfarin is a potent blood-thinning agent that requires monitoring to ensure that the dosage is correct for your body.  If it isn't, you could develop serious, sometimes life-threatening bleeding problems or life-threatening blood clots or stroke could result.     It is extremely important that you have your lab work drawn as soon as possible.  We are open Monday-Friday 8 am until 5 pm.  You may reach our Service at [phone].     To monitor you effectively, we need to be able to communicate with you.  This is a requirement to be followed by our Service.  If we are unable to contact you on three separate occasions, you will be discharged from the Anticoagulation Service.     If you repeatedly fail to keep your lab appointments, you are at risk of being discharged from the Service.           Sincerely,           Devin Santana, PharmD, [facility]  Pharmacy Clinical Supervisor  [facility]  Outpatient Anticoagulation Service

## 2020-05-29 NOTE — TELEPHONE ENCOUNTER
Left message for pt to have INR checked  4th call  2nd letter sent  Briana Roberson, Clinical Pharmacist, CDE, CACP

## 2020-07-09 ENCOUNTER — TELEPHONE (OUTPATIENT)
Dept: VASCULAR LAB | Facility: MEDICAL CENTER | Age: 55
End: 2020-07-09

## 2020-07-09 NOTE — TELEPHONE ENCOUNTER
Left message for pt to have INR checked  6th call  3rd letter sent  INR   Date Value Ref Range Status   02/14/2020 2.50  Final     POC INR   Date Value Ref Range Status   10/28/2019 2.7 (H) 0.9 - 1.2 Final     Comment:     INR - Non-therapeutic Reference Range: 0.9-1.2  INR - Therapeutic Reference Range: 2.0-4.0       Briana Roberson, Clinical Pharmacist, CDE, CACP

## 2020-07-09 NOTE — LETTER
July 9, 2020            Joshua Medrano  [address] [facility] [address]        Dear Joshua Medrano ,    We have been unsuccessful in our attempts to contact you regarding your Anticoagulation Service appointments. Warfain is a potent blood-thinning agent that requires monitoring to ensure that the dosage is correct for your body.  If it isn't, you could develop serious, sometimes life-threatening bleeding problems or life-threatening blood clots or stroke could result.     It is extremely important that you have your lab work drawn as soon as possible.  We are open Monday-Friday 8 am until 5 pm.  You may reach our Service at [phone].     To monitor you effectively, we need to be able to communicate with you.  This is a requirement to be followed by our Service.  If we are unable to contact you on three separate occasions, you will be discharged from the Anticoagulation Service.     If you repeatedly fail to keep your lab appointments, you are at risk of being discharged from the Service.           Sincerely,           Devin Santana, PharmD, [facility]  Pharmacy Clinical Supervisor  [facility]  Outpatient Anticoagulation Service

## 2020-07-14 ENCOUNTER — ANTICOAGULATION MONITORING (OUTPATIENT)
Dept: VASCULAR LAB | Facility: MEDICAL CENTER | Age: 55
End: 2020-07-14

## 2020-07-14 DIAGNOSIS — I48.0 PAROXYSMAL ATRIAL FIBRILLATION (HCC): ICD-10-CM

## 2020-07-14 NOTE — PROGRESS NOTES
[facility]  Secondary to non adherence/non compliance. Will defer all medical management to PCP.  Briana Roberson, Clinical Pharmacist, CDE, CACP

## 2020-08-12 ENCOUNTER — ANTICOAGULATION VISIT (OUTPATIENT)
Dept: VASCULAR LAB | Facility: MEDICAL CENTER | Age: 55
End: 2020-08-12
Attending: INTERNAL MEDICINE
Payer: MEDICARE

## 2020-08-12 DIAGNOSIS — Z79.01 CHRONIC ANTICOAGULATION: ICD-10-CM

## 2020-08-12 DIAGNOSIS — I48.0 PAROXYSMAL ATRIAL FIBRILLATION (HCC): ICD-10-CM

## 2020-08-12 LAB — INR PPP: 1.7 (ref 2–3.5)

## 2020-08-12 PROCEDURE — 85610 PROTHROMBIN TIME: CPT

## 2020-08-12 PROCEDURE — 99212 OFFICE O/P EST SF 10 MIN: CPT

## 2020-08-12 RX ORDER — WARFARIN SODIUM 5 MG/1
TABLET ORAL
Qty: 45 TAB | Refills: 0 | Status: ON HOLD | OUTPATIENT
Start: 2020-08-12 | End: 2020-11-16

## 2020-08-12 NOTE — PROGRESS NOTES
Anticoagulation Summary  As of 2020    INR goal:  2.0-3.0   TTR:  --   INR used for dosin.70 (2020)   Warfarin maintenance plan:  7.5 mg (5 mg x 1.5) every Sun, Tue, Thu; 5 mg (5 mg x 1) all other days   Weekly warfarin total:  42.5 mg   Plan last modified:  Magali Vazquez PharmD (2020)   Next INR check:  2020   Target end date:  Indefinite    Indications    Atrial flutter with rapid ventricular response (HCC) (Resolved) [I48.92]  Paroxysmal atrial fibrillation (HCC) [I48.0]             Anticoagulation Episode Summary     INR check location:      Preferred lab:      Send INR reminders to:      Comments:              Anticoagulation Patient Findings  Patient Findings     Negatives:  Signs/symptoms of thrombosis, Signs/symptoms of bleeding, Laboratory test error suspected, Change in health, Change in alcohol use, Change in activity, Upcoming invasive procedure, Emergency department visit, Upcoming dental procedure, Missed doses, Extra doses, Change in medications, Change in diet/appetite, Hospital admission, Bruising, Other complaints          HPI:  Joshua Medrano seen in clinic today, on anticoagulation therapy with warfarin for atrial fib  Changes to current medical/health status since last appt: none  Denies signs/symptoms of bleeding and/or thrombosis since the last appt.    Denies any interval changes to diet  Denies any interval changes to medications since last appt.   Denies any complications or cost restrictions with current therapy.   Verified current warfarin dosing schedule.   BP declined    A/P   INR  sub-therapeutic.   Bolus x 1 day then continue regimen    Follow up appointment in 3 week(s) per pt preference. Declines to be seen sooner. Expressed to pt importance of compliance to INR checks while on warfarin.    Magali Vazquez, ZakD

## 2020-09-02 ENCOUNTER — ANTICOAGULATION VISIT (OUTPATIENT)
Dept: VASCULAR LAB | Facility: MEDICAL CENTER | Age: 55
End: 2020-09-02
Attending: INTERNAL MEDICINE
Payer: MEDICARE

## 2020-09-02 DIAGNOSIS — I48.0 PAROXYSMAL ATRIAL FIBRILLATION (HCC): ICD-10-CM

## 2020-09-02 NOTE — PROGRESS NOTES
Anticoagulation Summary  As of 9/2/2020    INR goal:  2.0-3.0   TTR:  --   INR used for dosing:  No new INR was available at the time of this encounter.   Warfarin maintenance plan:  7.5 mg (5 mg x 1.5) every Sun, Tue, Thu; 5 mg (5 mg x 1) all other days   Weekly warfarin total:  42.5 mg   Plan last modified:  Magali Vazquez, PharmD (8/12/2020)   Next INR check:  9/23/2020   Target end date:  Indefinite    Indications    Atrial flutter with rapid ventricular response (HCC) (Resolved) [I48.92]  Paroxysmal atrial fibrillation (HCC) [I48.0]             Anticoagulation Episode Summary     INR check location:      Preferred lab:      Send INR reminders to:      Comments:                  Anticoagulation Patient Findings  Patient Findings     Positives:  Change in health (popped his R knee - taking APAP)    Negatives:  Signs/symptoms of thrombosis, Signs/symptoms of bleeding, Laboratory test error suspected, Change in alcohol use, Change in activity, Upcoming invasive procedure, Emergency department visit, Upcoming dental procedure, Missed doses, Extra doses, Change in medications, Change in diet/appetite, Hospital admission, Bruising, Other complaints             HPI:  Joshua Richey Marcela seen in clinic today, on anticoagulation therapy with warfarin for Afib  Changes to current medical/health status since last appt: denies  Denies signs/symptoms of bleeding and/or thrombosis since the last appt.    Denies any interval changes to diet  Denies any interval changes to medications since last appt.   Denies any complications or cost restrictions with current therapy.   BP declines        A/P   Unable to draw POCT INR in clinic, pt to get lab draw tomorrow @ [address]    Placed CBC and INR order to be drawn tomorrow since pt has hx of polycythemia.   Pt to get CBC drawn in the AM, then INR later in the PM after CBC results    LVM for Lexy Reyez,  Lead @ [phone] to coordinate care.    No changes, no issues.  Will call pt with results and set up f/u appropriately    Follow up appointment in 3 week(s) but may need sooner f/u pending INR results.    Magali Vazquez, ZakD

## 2020-09-03 ENCOUNTER — HOSPITAL ENCOUNTER (OUTPATIENT)
Dept: LAB | Facility: MEDICAL CENTER | Age: 55
End: 2020-09-03
Attending: INTERNAL MEDICINE
Payer: MEDICARE

## 2020-09-03 LAB
ANISOCYTOSIS BLD QL SMEAR: ABNORMAL
BASOPHILS # BLD AUTO: 1.7 % (ref 0–1.8)
BASOPHILS # BLD: 0.23 K/UL (ref 0–0.12)
COMMENT 1642: NORMAL
EOSINOPHIL # BLD AUTO: 0.3 K/UL (ref 0–0.51)
EOSINOPHIL NFR BLD: 2.2 % (ref 0–6.9)
ERYTHROCYTE [DISTWIDTH] IN BLOOD BY AUTOMATED COUNT: 68.4 FL (ref 35.9–50)
HCT VFR BLD AUTO: 61.6 % (ref 42–52)
HGB BLD-MCNC: 19.6 G/DL (ref 14–18)
IMM GRANULOCYTES # BLD AUTO: 0.14 K/UL (ref 0–0.11)
IMM GRANULOCYTES NFR BLD AUTO: 1 % (ref 0–0.9)
LG PLATELETS BLD QL SMEAR: NORMAL
LYMPHOCYTES # BLD AUTO: 0.66 K/UL (ref 1–4.8)
LYMPHOCYTES NFR BLD: 4.8 % (ref 22–41)
MACROCYTES BLD QL SMEAR: ABNORMAL
MCH RBC QN AUTO: 30.4 PG (ref 27–33)
MCHC RBC AUTO-ENTMCNC: 31.8 G/DL (ref 33.7–35.3)
MCV RBC AUTO: 95.5 FL (ref 81.4–97.8)
MONOCYTES # BLD AUTO: 0.85 K/UL (ref 0–0.85)
MONOCYTES NFR BLD AUTO: 6.2 % (ref 0–13.4)
MORPHOLOGY BLD-IMP: NORMAL
NEUTROPHILS # BLD AUTO: 11.49 K/UL (ref 1.82–7.42)
NEUTROPHILS NFR BLD: 84.1 % (ref 44–72)
NRBC # BLD AUTO: 0 K/UL
NRBC BLD-RTO: 0 /100 WBC
PLATELET # BLD AUTO: 206 K/UL (ref 164–446)
PLATELET BLD QL SMEAR: NORMAL
PMV BLD AUTO: 10.9 FL (ref 9–12.9)
RBC # BLD AUTO: 6.45 M/UL (ref 4.7–6.1)
RBC BLD AUTO: PRESENT
WBC # BLD AUTO: 13.7 K/UL (ref 4.8–10.8)

## 2020-09-03 PROCEDURE — 36415 COLL VENOUS BLD VENIPUNCTURE: CPT

## 2020-09-03 PROCEDURE — 85025 COMPLETE CBC W/AUTO DIFF WBC: CPT

## 2020-09-04 ENCOUNTER — TELEPHONE (OUTPATIENT)
Dept: VASCULAR LAB | Facility: MEDICAL CENTER | Age: 55
End: 2020-09-04

## 2020-09-04 NOTE — TELEPHONE ENCOUNTER
Spoke with mrs. Medrano to remind Joshua to get INR done.  His cbc is complete for this month (polycythemia)  Briana Roberson, Clinical Pharmacist, CDE, CACP

## 2020-09-08 ENCOUNTER — TELEPHONE (OUTPATIENT)
Dept: VASCULAR LAB | Facility: MEDICAL CENTER | Age: 55
End: 2020-09-08

## 2020-09-08 NOTE — TELEPHONE ENCOUNTER
LVM for pt to get his INR checked. Unable to speak to pt regarding his hx of polycythemia and if he needs referral to heme/onc.    Magali Vazquez, ZakD

## 2020-09-09 DIAGNOSIS — Z79.01 CHRONIC ANTICOAGULATION: ICD-10-CM

## 2020-09-16 ENCOUNTER — TELEPHONE (OUTPATIENT)
Dept: VASCULAR LAB | Facility: MEDICAL CENTER | Age: 55
End: 2020-09-16

## 2020-09-25 ENCOUNTER — ANTICOAGULATION MONITORING (OUTPATIENT)
Dept: MEDICAL GROUP | Facility: PHYSICIAN GROUP | Age: 55
End: 2020-09-25

## 2020-09-25 DIAGNOSIS — I48.0 PAROXYSMAL ATRIAL FIBRILLATION (HCC): ICD-10-CM

## 2020-09-25 NOTE — PROGRESS NOTES
Anticoagulation clinic    Reminder voice message for patient regarding getting INR done ASAP for anticoagulation clinic.     He also needs to follow-up with hematologist regarding his polycythemia.    Rafi Chaudhary, ZakD

## 2020-10-06 ENCOUNTER — TELEPHONE (OUTPATIENT)
Dept: VASCULAR LAB | Facility: MEDICAL CENTER | Age: 55
End: 2020-10-06

## 2020-10-06 NOTE — TELEPHONE ENCOUNTER
[facility]      for pt to return call and make f/u appt for INR.    Noel Vegas, PharmD

## 2020-10-08 ENCOUNTER — TELEPHONE (OUTPATIENT)
Dept: CARDIOLOGY | Facility: MEDICAL CENTER | Age: 55
End: 2020-10-08

## 2020-10-08 NOTE — TELEPHONE ENCOUNTER
Left VM reminder to get INR tested and to establish with a hematologist.    As there have been several reminder calls will send compliance letter  Briana Roberson, Clinical Pharmacist, CDE, CACP

## 2020-10-13 ENCOUNTER — TELEPHONE (OUTPATIENT)
Dept: VASCULAR LAB | Facility: MEDICAL CENTER | Age: 55
End: 2020-10-13

## 2020-10-21 ENCOUNTER — TELEPHONE (OUTPATIENT)
Dept: VASCULAR LAB | Facility: MEDICAL CENTER | Age: 55
End: 2020-10-21

## 2020-10-29 ENCOUNTER — HOSPITAL ENCOUNTER (INPATIENT)
Facility: MEDICAL CENTER | Age: 55
LOS: 17 days | DRG: 853 | End: 2020-11-16
Attending: EMERGENCY MEDICINE | Admitting: INTERNAL MEDICINE
Payer: MEDICARE

## 2020-10-29 ENCOUNTER — APPOINTMENT (OUTPATIENT)
Dept: RADIOLOGY | Facility: MEDICAL CENTER | Age: 55
DRG: 853 | End: 2020-10-29
Attending: EMERGENCY MEDICINE
Payer: MEDICARE

## 2020-10-29 DIAGNOSIS — N17.9 AKI (ACUTE KIDNEY INJURY) (HCC): ICD-10-CM

## 2020-10-29 DIAGNOSIS — E87.20 LACTIC ACIDOSIS: ICD-10-CM

## 2020-10-29 DIAGNOSIS — A41.9 SEPSIS, DUE TO UNSPECIFIED ORGANISM, UNSPECIFIED WHETHER ACUTE ORGAN DYSFUNCTION PRESENT (HCC): ICD-10-CM

## 2020-10-29 DIAGNOSIS — L03.119 CELLULITIS OF LOWER EXTREMITY, UNSPECIFIED LATERALITY: ICD-10-CM

## 2020-10-29 DIAGNOSIS — E80.6 HYPERBILIRUBINEMIA: ICD-10-CM

## 2020-10-29 DIAGNOSIS — E87.1 HYPONATREMIA: ICD-10-CM

## 2020-10-29 DIAGNOSIS — R79.89 ELEVATED TROPONIN: ICD-10-CM

## 2020-10-29 LAB
ALBUMIN SERPL BCP-MCNC: 3.2 G/DL (ref 3.2–4.9)
ALBUMIN/GLOB SERPL: 0.7 G/DL
ALP SERPL-CCNC: 141 U/L (ref 30–99)
ALT SERPL-CCNC: 41 U/L (ref 2–50)
ANION GAP SERPL CALC-SCNC: 15 MMOL/L (ref 7–16)
AST SERPL-CCNC: 61 U/L (ref 12–45)
BILIRUB SERPL-MCNC: 1.7 MG/DL (ref 0.1–1.5)
BUN SERPL-MCNC: 41 MG/DL (ref 8–22)
CALCIUM SERPL-MCNC: 8.9 MG/DL (ref 8.5–10.5)
CHLORIDE SERPL-SCNC: 91 MMOL/L (ref 96–112)
CO2 SERPL-SCNC: 22 MMOL/L (ref 20–33)
CREAT SERPL-MCNC: 1.6 MG/DL (ref 0.5–1.4)
CRP SERPL HS-MCNC: 30.09 MG/DL (ref 0–0.75)
GLOBULIN SER CALC-MCNC: 4.6 G/DL (ref 1.9–3.5)
GLUCOSE SERPL-MCNC: 142 MG/DL (ref 65–99)
POTASSIUM SERPL-SCNC: 4.5 MMOL/L (ref 3.6–5.5)
PROT SERPL-MCNC: 7.8 G/DL (ref 6–8.2)
SODIUM SERPL-SCNC: 128 MMOL/L (ref 135–145)
TROPONIN T SERPL-MCNC: 47 NG/L (ref 6–19)

## 2020-10-29 PROCEDURE — 83605 ASSAY OF LACTIC ACID: CPT

## 2020-10-29 PROCEDURE — 80053 COMPREHEN METABOLIC PANEL: CPT

## 2020-10-29 PROCEDURE — 86140 C-REACTIVE PROTEIN: CPT

## 2020-10-29 PROCEDURE — 85025 COMPLETE CBC W/AUTO DIFF WBC: CPT

## 2020-10-29 PROCEDURE — 84484 ASSAY OF TROPONIN QUANT: CPT

## 2020-10-29 PROCEDURE — 99285 EMERGENCY DEPT VISIT HI MDM: CPT

## 2020-10-29 PROCEDURE — 93005 ELECTROCARDIOGRAM TRACING: CPT | Performed by: EMERGENCY MEDICINE

## 2020-10-29 PROCEDURE — 87040 BLOOD CULTURE FOR BACTERIA: CPT

## 2020-10-29 PROCEDURE — 84145 PROCALCITONIN (PCT): CPT

## 2020-10-29 PROCEDURE — 700105 HCHG RX REV CODE 258: Performed by: EMERGENCY MEDICINE

## 2020-10-29 PROCEDURE — 87077 CULTURE AEROBIC IDENTIFY: CPT

## 2020-10-29 PROCEDURE — 87181 SC STD AGAR DILUTION PER AGT: CPT

## 2020-10-29 RX ORDER — CLINDAMYCIN PHOSPHATE 900 MG/50ML
900 INJECTION, SOLUTION INTRAVENOUS ONCE
Status: COMPLETED | OUTPATIENT
Start: 2020-10-29 | End: 2020-10-30

## 2020-10-29 RX ORDER — SODIUM CHLORIDE 9 MG/ML
1000 INJECTION, SOLUTION INTRAVENOUS ONCE
Status: COMPLETED | OUTPATIENT
Start: 2020-10-29 | End: 2020-10-30

## 2020-10-29 RX ADMIN — SODIUM CHLORIDE 1000 ML: 9 INJECTION, SOLUTION INTRAVENOUS at 23:29

## 2020-10-29 ASSESSMENT — FIBROSIS 4 INDEX: FIB4 SCORE: 1.28

## 2020-10-30 ENCOUNTER — APPOINTMENT (OUTPATIENT)
Dept: RADIOLOGY | Facility: MEDICAL CENTER | Age: 55
DRG: 853 | End: 2020-10-30
Attending: EMERGENCY MEDICINE
Payer: MEDICARE

## 2020-10-30 PROBLEM — A41.9 SEPSIS (HCC): Status: ACTIVE | Noted: 2020-10-30

## 2020-10-30 PROBLEM — L03.90 CELLULITIS: Status: ACTIVE | Noted: 2020-10-30

## 2020-10-30 PROBLEM — E87.29 HIGH ANION GAP METABOLIC ACIDOSIS: Status: ACTIVE | Noted: 2020-10-30

## 2020-10-30 PROBLEM — R79.89 ELEVATED LFTS: Status: ACTIVE | Noted: 2020-10-30

## 2020-10-30 PROBLEM — D75.1 POLYCYTHEMIA: Status: ACTIVE | Noted: 2020-10-30

## 2020-10-30 LAB
ALBUMIN SERPL BCP-MCNC: 2.6 G/DL (ref 3.2–4.9)
ALBUMIN/GLOB SERPL: 0.6 G/DL
ALP SERPL-CCNC: 123 U/L (ref 30–99)
ALT SERPL-CCNC: 34 U/L (ref 2–50)
ANION GAP SERPL CALC-SCNC: 11 MMOL/L (ref 7–16)
ANION GAP SERPL CALC-SCNC: 12 MMOL/L (ref 7–16)
ANION GAP SERPL CALC-SCNC: 13 MMOL/L (ref 7–16)
ANION GAP SERPL CALC-SCNC: 14 MMOL/L (ref 7–16)
ANISOCYTOSIS BLD QL SMEAR: ABNORMAL
APPEARANCE UR: ABNORMAL
APTT PPP: 68 SEC (ref 24.7–36)
AST SERPL-CCNC: 50 U/L (ref 12–45)
BACTERIA #/AREA URNS HPF: NEGATIVE /HPF
BASOPHILS # BLD AUTO: 0 % (ref 0–1.8)
BASOPHILS # BLD AUTO: 0 % (ref 0–1.8)
BASOPHILS # BLD AUTO: 0.2 % (ref 0–1.8)
BASOPHILS # BLD AUTO: 0.4 % (ref 0–1.8)
BASOPHILS # BLD AUTO: 0.9 % (ref 0–1.8)
BASOPHILS # BLD AUTO: 1.7 % (ref 0–1.8)
BASOPHILS # BLD: 0 K/UL (ref 0–0.12)
BASOPHILS # BLD: 0 K/UL (ref 0–0.12)
BASOPHILS # BLD: 0.04 K/UL (ref 0–0.12)
BASOPHILS # BLD: 0.07 K/UL (ref 0–0.12)
BASOPHILS # BLD: 0.19 K/UL (ref 0–0.12)
BASOPHILS # BLD: 0.38 K/UL (ref 0–0.12)
BILIRUB SERPL-MCNC: 1.4 MG/DL (ref 0.1–1.5)
BILIRUB UR QL STRIP.AUTO: ABNORMAL
BUN SERPL-MCNC: 40 MG/DL (ref 8–22)
BUN SERPL-MCNC: 40 MG/DL (ref 8–22)
BUN SERPL-MCNC: 43 MG/DL (ref 8–22)
BUN SERPL-MCNC: 46 MG/DL (ref 8–22)
BURR CELLS BLD QL SMEAR: NORMAL
BURR CELLS BLD QL SMEAR: NORMAL
CALCIUM SERPL-MCNC: 8.2 MG/DL (ref 8.5–10.5)
CALCIUM SERPL-MCNC: 8.2 MG/DL (ref 8.5–10.5)
CALCIUM SERPL-MCNC: 8.4 MG/DL (ref 8.5–10.5)
CALCIUM SERPL-MCNC: 8.5 MG/DL (ref 8.5–10.5)
CHLORIDE SERPL-SCNC: 93 MMOL/L (ref 96–112)
CHLORIDE SERPL-SCNC: 96 MMOL/L (ref 96–112)
CHLORIDE SERPL-SCNC: 96 MMOL/L (ref 96–112)
CHLORIDE SERPL-SCNC: 97 MMOL/L (ref 96–112)
CK SERPL-CCNC: 381 U/L (ref 0–154)
CO2 SERPL-SCNC: 18 MMOL/L (ref 20–33)
CO2 SERPL-SCNC: 19 MMOL/L (ref 20–33)
CO2 SERPL-SCNC: 21 MMOL/L (ref 20–33)
CO2 SERPL-SCNC: 22 MMOL/L (ref 20–33)
COLOR UR: ABNORMAL
COVID ORDER STATUS COVID19: NORMAL
CREAT SERPL-MCNC: 1.48 MG/DL (ref 0.5–1.4)
CREAT SERPL-MCNC: 1.61 MG/DL (ref 0.5–1.4)
CREAT SERPL-MCNC: 1.66 MG/DL (ref 0.5–1.4)
CREAT SERPL-MCNC: 1.82 MG/DL (ref 0.5–1.4)
CREAT UR-MCNC: 134.56 MG/DL
D DIMER PPP IA.FEU-MCNC: 3.91 UG/ML (FEU) (ref 0–0.5)
EKG IMPRESSION: NORMAL
EOSINOPHIL # BLD AUTO: 0 K/UL (ref 0–0.51)
EOSINOPHIL # BLD AUTO: 0.03 K/UL (ref 0–0.51)
EOSINOPHIL # BLD AUTO: 0.05 K/UL (ref 0–0.51)
EOSINOPHIL # BLD AUTO: 0.07 K/UL (ref 0–0.51)
EOSINOPHIL NFR BLD: 0 % (ref 0–6.9)
EOSINOPHIL NFR BLD: 0.2 % (ref 0–6.9)
EOSINOPHIL NFR BLD: 0.2 % (ref 0–6.9)
EOSINOPHIL NFR BLD: 0.3 % (ref 0–6.9)
EPI CELLS #/AREA URNS HPF: ABNORMAL /HPF
ERYTHROCYTE [DISTWIDTH] IN BLOOD BY AUTOMATED COUNT: 59.4 FL (ref 35.9–50)
ERYTHROCYTE [DISTWIDTH] IN BLOOD BY AUTOMATED COUNT: 61.5 FL (ref 35.9–50)
ERYTHROCYTE [DISTWIDTH] IN BLOOD BY AUTOMATED COUNT: 61.9 FL (ref 35.9–50)
ERYTHROCYTE [DISTWIDTH] IN BLOOD BY AUTOMATED COUNT: 62 FL (ref 35.9–50)
ERYTHROCYTE [DISTWIDTH] IN BLOOD BY AUTOMATED COUNT: 62.1 FL (ref 35.9–50)
ERYTHROCYTE [DISTWIDTH] IN BLOOD BY AUTOMATED COUNT: 62.4 FL (ref 35.9–50)
ERYTHROCYTE [SEDIMENTATION RATE] IN BLOOD BY WESTERGREN METHOD: 0 MM/HOUR (ref 0–20)
EST. AVERAGE GLUCOSE BLD GHB EST-MCNC: 177 MG/DL
FIBRINOGEN PPP-MCNC: 446 MG/DL (ref 215–460)
GGT SERPL-CCNC: 61 U/L (ref 7–51)
GLOBULIN SER CALC-MCNC: 4.2 G/DL (ref 1.9–3.5)
GLUCOSE BLD-MCNC: 129 MG/DL (ref 65–99)
GLUCOSE BLD-MCNC: 147 MG/DL (ref 65–99)
GLUCOSE BLD-MCNC: 167 MG/DL (ref 65–99)
GLUCOSE BLD-MCNC: 171 MG/DL (ref 65–99)
GLUCOSE SERPL-MCNC: 135 MG/DL (ref 65–99)
GLUCOSE SERPL-MCNC: 163 MG/DL (ref 65–99)
GLUCOSE SERPL-MCNC: 164 MG/DL (ref 65–99)
GLUCOSE SERPL-MCNC: 174 MG/DL (ref 65–99)
GLUCOSE UR STRIP.AUTO-MCNC: NEGATIVE MG/DL
GRAM STN SPEC: NORMAL
GRAN CASTS #/AREA URNS LPF: ABNORMAL /LPF
HBA1C MFR BLD: 7.8 % (ref 0–5.6)
HCT VFR BLD AUTO: 57.2 % (ref 42–52)
HCT VFR BLD AUTO: 60.5 % (ref 42–52)
HCT VFR BLD AUTO: 60.7 % (ref 42–52)
HCT VFR BLD AUTO: 61.6 % (ref 42–52)
HCT VFR BLD AUTO: 61.8 % (ref 42–52)
HCT VFR BLD AUTO: 65.6 % (ref 42–52)
HGB BLD-MCNC: 18.6 G/DL (ref 14–18)
HGB BLD-MCNC: 19.3 G/DL (ref 14–18)
HGB BLD-MCNC: 19.7 G/DL (ref 14–18)
HGB BLD-MCNC: 19.8 G/DL (ref 14–18)
HGB BLD-MCNC: 19.8 G/DL (ref 14–18)
HGB BLD-MCNC: 20.7 G/DL (ref 14–18)
HYALINE CASTS #/AREA URNS LPF: ABNORMAL /LPF
IMM GRANULOCYTES # BLD AUTO: 0.12 K/UL (ref 0–0.11)
IMM GRANULOCYTES # BLD AUTO: 0.24 K/UL (ref 0–0.11)
IMM GRANULOCYTES # BLD AUTO: 0.28 K/UL (ref 0–0.11)
IMM GRANULOCYTES NFR BLD AUTO: 0.6 % (ref 0–0.9)
IMM GRANULOCYTES NFR BLD AUTO: 1.1 % (ref 0–0.9)
IMM GRANULOCYTES NFR BLD AUTO: 1.3 % (ref 0–0.9)
INR PPP: 3.94 (ref 0.87–1.13)
INR PPP: 4.46 (ref 0.87–1.13)
KETONES UR STRIP.AUTO-MCNC: NEGATIVE MG/DL
LACTATE BLD-SCNC: 1.7 MMOL/L (ref 0.5–2)
LACTATE BLD-SCNC: 1.8 MMOL/L (ref 0.5–2)
LACTATE BLD-SCNC: 1.9 MMOL/L (ref 0.5–2)
LACTATE BLD-SCNC: 2.1 MMOL/L (ref 0.5–2)
LACTATE BLD-SCNC: 2.2 MMOL/L (ref 0.5–2)
LACTATE BLD-SCNC: 2.3 MMOL/L (ref 0.5–2)
LACTATE BLD-SCNC: 3.7 MMOL/L (ref 0.5–2)
LDH SERPL L TO P-CCNC: 338 U/L (ref 107–266)
LEUKOCYTE ESTERASE UR QL STRIP.AUTO: ABNORMAL
LYMPHOCYTES # BLD AUTO: 0 K/UL (ref 1–4.8)
LYMPHOCYTES # BLD AUTO: 0.16 K/UL (ref 1–4.8)
LYMPHOCYTES # BLD AUTO: 0.16 K/UL (ref 1–4.8)
LYMPHOCYTES # BLD AUTO: 0.25 K/UL (ref 1–4.8)
LYMPHOCYTES # BLD AUTO: 0.3 K/UL (ref 1–4.8)
LYMPHOCYTES # BLD AUTO: 0.67 K/UL (ref 1–4.8)
LYMPHOCYTES NFR BLD: 0 % (ref 22–41)
LYMPHOCYTES NFR BLD: 0.7 % (ref 22–41)
LYMPHOCYTES NFR BLD: 0.8 % (ref 22–41)
LYMPHOCYTES NFR BLD: 1.2 % (ref 22–41)
LYMPHOCYTES NFR BLD: 1.4 % (ref 22–41)
LYMPHOCYTES NFR BLD: 3 % (ref 22–41)
MACROCYTES BLD QL SMEAR: ABNORMAL
MAGNESIUM SERPL-MCNC: 1.6 MG/DL (ref 1.5–2.5)
MANUAL DIFF BLD: ABNORMAL
MANUAL DIFF BLD: NORMAL
MANUAL DIFF BLD: NORMAL
MCH RBC QN AUTO: 29 PG (ref 27–33)
MCH RBC QN AUTO: 29.2 PG (ref 27–33)
MCH RBC QN AUTO: 29.3 PG (ref 27–33)
MCH RBC QN AUTO: 29.4 PG (ref 27–33)
MCH RBC QN AUTO: 29.4 PG (ref 27–33)
MCH RBC QN AUTO: 29.9 PG (ref 27–33)
MCHC RBC AUTO-ENTMCNC: 31.8 G/DL (ref 33.7–35.3)
MCHC RBC AUTO-ENTMCNC: 31.9 G/DL (ref 33.7–35.3)
MCHC RBC AUTO-ENTMCNC: 32 G/DL (ref 33.7–35.3)
MCHC RBC AUTO-ENTMCNC: 32.1 G/DL (ref 33.7–35.3)
MCHC RBC AUTO-ENTMCNC: 32.5 G/DL (ref 33.7–35.3)
MCHC RBC AUTO-ENTMCNC: 32.6 G/DL (ref 33.7–35.3)
MCV RBC AUTO: 89.7 FL (ref 81.4–97.8)
MCV RBC AUTO: 91.3 FL (ref 81.4–97.8)
MCV RBC AUTO: 91.4 FL (ref 81.4–97.8)
MCV RBC AUTO: 91.6 FL (ref 81.4–97.8)
MCV RBC AUTO: 91.9 FL (ref 81.4–97.8)
MCV RBC AUTO: 92.3 FL (ref 81.4–97.8)
METAMYELOCYTES NFR BLD MANUAL: 0.8 %
MICRO URNS: ABNORMAL
MONOCYTES # BLD AUTO: 0.62 K/UL (ref 0–0.85)
MONOCYTES # BLD AUTO: 0.63 K/UL (ref 0–0.85)
MONOCYTES # BLD AUTO: 0.66 K/UL (ref 0–0.85)
MONOCYTES # BLD AUTO: 0.69 K/UL (ref 0–0.85)
MONOCYTES # BLD AUTO: 0.89 K/UL (ref 0–0.85)
MONOCYTES # BLD AUTO: 1.14 K/UL (ref 0–0.85)
MONOCYTES NFR BLD AUTO: 2.9 % (ref 0–13.4)
MONOCYTES NFR BLD AUTO: 2.9 % (ref 0–13.4)
MONOCYTES NFR BLD AUTO: 3.1 % (ref 0–13.4)
MONOCYTES NFR BLD AUTO: 3.2 % (ref 0–13.4)
MONOCYTES NFR BLD AUTO: 4 % (ref 0–13.4)
MONOCYTES NFR BLD AUTO: 5.1 % (ref 0–13.4)
MORPHOLOGY BLD-IMP: NORMAL
NEUTROPHILS # BLD AUTO: 18.7 K/UL (ref 1.82–7.42)
NEUTROPHILS # BLD AUTO: 19.92 K/UL (ref 1.82–7.42)
NEUTROPHILS # BLD AUTO: 20.68 K/UL (ref 1.82–7.42)
NEUTROPHILS # BLD AUTO: 20.74 K/UL (ref 1.82–7.42)
NEUTROPHILS # BLD AUTO: 20.74 K/UL (ref 1.82–7.42)
NEUTROPHILS # BLD AUTO: 21.86 K/UL (ref 1.82–7.42)
NEUTROPHILS NFR BLD: 79 % (ref 44–72)
NEUTROPHILS NFR BLD: 86.4 % (ref 44–72)
NEUTROPHILS NFR BLD: 91.2 % (ref 44–72)
NEUTROPHILS NFR BLD: 93.4 % (ref 44–72)
NEUTROPHILS NFR BLD: 94 % (ref 44–72)
NEUTROPHILS NFR BLD: 94.8 % (ref 44–72)
NEUTS BAND NFR BLD MANUAL: 14 % (ref 0–10)
NEUTS BAND NFR BLD MANUAL: 5.1 % (ref 0–10)
NEUTS BAND NFR BLD MANUAL: 5.9 % (ref 0–10)
NITRITE UR QL STRIP.AUTO: NEGATIVE
NRBC # BLD AUTO: 0 K/UL
NRBC BLD-RTO: 0 /100 WBC
NT-PROBNP SERPL IA-MCNC: ABNORMAL PG/ML (ref 0–125)
OSMOLALITY SERPL: 287 MOSM/KG H2O (ref 278–298)
OSMOLALITY UR: 628 MOSM/KG H2O (ref 300–900)
OVALOCYTES BLD QL SMEAR: NORMAL
PH UR STRIP.AUTO: 5 [PH] (ref 5–8)
PLATELET # BLD AUTO: 102 K/UL (ref 164–446)
PLATELET # BLD AUTO: 105 K/UL (ref 164–446)
PLATELET # BLD AUTO: 111 K/UL (ref 164–446)
PLATELET # BLD AUTO: 114 K/UL (ref 164–446)
PLATELET # BLD AUTO: 120 K/UL (ref 164–446)
PLATELET # BLD AUTO: 122 K/UL (ref 164–446)
PLATELET BLD QL SMEAR: NORMAL
PMV BLD AUTO: 11.2 FL (ref 9–12.9)
PMV BLD AUTO: 11.2 FL (ref 9–12.9)
PMV BLD AUTO: 11.5 FL (ref 9–12.9)
PMV BLD AUTO: 11.7 FL (ref 9–12.9)
PMV BLD AUTO: 11.7 FL (ref 9–12.9)
PMV BLD AUTO: 12.5 FL (ref 9–12.9)
POIKILOCYTOSIS BLD QL SMEAR: NORMAL
POLYCHROMASIA BLD QL SMEAR: NORMAL
POLYCHROMASIA BLD QL SMEAR: NORMAL
POTASSIUM SERPL-SCNC: 4.2 MMOL/L (ref 3.6–5.5)
POTASSIUM SERPL-SCNC: 4.4 MMOL/L (ref 3.6–5.5)
POTASSIUM SERPL-SCNC: 4.5 MMOL/L (ref 3.6–5.5)
POTASSIUM SERPL-SCNC: 4.7 MMOL/L (ref 3.6–5.5)
PROCALCITONIN SERPL-MCNC: 8.86 NG/ML
PROT SERPL-MCNC: 6.8 G/DL (ref 6–8.2)
PROT UR QL STRIP: 100 MG/DL
PROT UR-MCNC: 135 MG/DL (ref 0–15)
PROTHROMBIN TIME: 39.7 SEC (ref 12–14.6)
PROTHROMBIN TIME: 43.8 SEC (ref 12–14.6)
RBC # BLD AUTO: 6.38 M/UL (ref 4.7–6.1)
RBC # BLD AUTO: 6.58 M/UL (ref 4.7–6.1)
RBC # BLD AUTO: 6.65 M/UL (ref 4.7–6.1)
RBC # BLD AUTO: 6.74 M/UL (ref 4.7–6.1)
RBC # BLD AUTO: 6.75 M/UL (ref 4.7–6.1)
RBC # BLD AUTO: 7.1 M/UL (ref 4.7–6.1)
RBC # URNS HPF: ABNORMAL /HPF
RBC BLD AUTO: PRESENT
RBC UR QL AUTO: ABNORMAL
SARS-COV-2 RNA RESP QL NAA+PROBE: NOTDETECTED
SIGNIFICANT IND 70042: NORMAL
SITE SITE: NORMAL
SODIUM SERPL-SCNC: 125 MMOL/L (ref 135–145)
SODIUM SERPL-SCNC: 128 MMOL/L (ref 135–145)
SODIUM SERPL-SCNC: 129 MMOL/L (ref 135–145)
SODIUM SERPL-SCNC: 130 MMOL/L (ref 135–145)
SODIUM UR-SCNC: <20 MMOL/L
SOURCE SOURCE: NORMAL
SP GR UR STRIP.AUTO: 1.03
SPECIMEN SOURCE: NORMAL
UROBILINOGEN UR STRIP.AUTO-MCNC: 1 MG/DL
UUN UR-MCNC: 1164 MG/DL
WBC # BLD AUTO: 19.7 K/UL (ref 4.8–10.8)
WBC # BLD AUTO: 21.3 K/UL (ref 4.8–10.8)
WBC # BLD AUTO: 22.1 K/UL (ref 4.8–10.8)
WBC # BLD AUTO: 22.3 K/UL (ref 4.8–10.8)
WBC # BLD AUTO: 22.4 K/UL (ref 4.8–10.8)
WBC # BLD AUTO: 22.7 K/UL (ref 4.8–10.8)
WBC #/AREA URNS HPF: ABNORMAL /HPF

## 2020-10-30 PROCEDURE — 73590 X-RAY EXAM OF LOWER LEG: CPT | Mod: RT

## 2020-10-30 PROCEDURE — A9270 NON-COVERED ITEM OR SERVICE: HCPCS | Performed by: STUDENT IN AN ORGANIZED HEALTH CARE EDUCATION/TRAINING PROGRAM

## 2020-10-30 PROCEDURE — 99223 1ST HOSP IP/OBS HIGH 75: CPT | Mod: AI,GC | Performed by: INTERNAL MEDICINE

## 2020-10-30 PROCEDURE — 770020 HCHG ROOM/CARE - TELE (206)

## 2020-10-30 PROCEDURE — 83605 ASSAY OF LACTIC ACID: CPT | Mod: 91

## 2020-10-30 PROCEDURE — 87077 CULTURE AEROBIC IDENTIFY: CPT

## 2020-10-30 PROCEDURE — 87186 SC STD MICRODIL/AGAR DIL: CPT

## 2020-10-30 PROCEDURE — 700111 HCHG RX REV CODE 636 W/ 250 OVERRIDE (IP): Performed by: STUDENT IN AN ORGANIZED HEALTH CARE EDUCATION/TRAINING PROGRAM

## 2020-10-30 PROCEDURE — 700101 HCHG RX REV CODE 250: Performed by: EMERGENCY MEDICINE

## 2020-10-30 PROCEDURE — 85027 COMPLETE CBC AUTOMATED: CPT

## 2020-10-30 PROCEDURE — 700102 HCHG RX REV CODE 250 W/ 637 OVERRIDE(OP): Performed by: STUDENT IN AN ORGANIZED HEALTH CARE EDUCATION/TRAINING PROGRAM

## 2020-10-30 PROCEDURE — 82962 GLUCOSE BLOOD TEST: CPT | Mod: 91

## 2020-10-30 PROCEDURE — 87070 CULTURE OTHR SPECIMN AEROBIC: CPT

## 2020-10-30 PROCEDURE — 96366 THER/PROPH/DIAG IV INF ADDON: CPT

## 2020-10-30 PROCEDURE — 96365 THER/PROPH/DIAG IV INF INIT: CPT

## 2020-10-30 PROCEDURE — 96376 TX/PRO/DX INJ SAME DRUG ADON: CPT

## 2020-10-30 PROCEDURE — 83735 ASSAY OF MAGNESIUM: CPT

## 2020-10-30 PROCEDURE — U0003 INFECTIOUS AGENT DETECTION BY NUCLEIC ACID (DNA OR RNA); SEVERE ACUTE RESPIRATORY SYNDROME CORONAVIRUS 2 (SARS-COV-2) (CORONAVIRUS DISEASE [COVID-19]), AMPLIFIED PROBE TECHNIQUE, MAKING USE OF HIGH THROUGHPUT TECHNOLOGIES AS DESCRIBED BY CMS-2020-01-R: HCPCS

## 2020-10-30 PROCEDURE — 700105 HCHG RX REV CODE 258: Performed by: STUDENT IN AN ORGANIZED HEALTH CARE EDUCATION/TRAINING PROGRAM

## 2020-10-30 PROCEDURE — 83935 ASSAY OF URINE OSMOLALITY: CPT

## 2020-10-30 PROCEDURE — 71045 X-RAY EXAM CHEST 1 VIEW: CPT

## 2020-10-30 PROCEDURE — 83930 ASSAY OF BLOOD OSMOLALITY: CPT

## 2020-10-30 PROCEDURE — 85730 THROMBOPLASTIN TIME PARTIAL: CPT

## 2020-10-30 PROCEDURE — 85652 RBC SED RATE AUTOMATED: CPT

## 2020-10-30 PROCEDURE — 700111 HCHG RX REV CODE 636 W/ 250 OVERRIDE (IP): Performed by: EMERGENCY MEDICINE

## 2020-10-30 PROCEDURE — 80048 BASIC METABOLIC PNL TOTAL CA: CPT

## 2020-10-30 PROCEDURE — 700105 HCHG RX REV CODE 258: Performed by: EMERGENCY MEDICINE

## 2020-10-30 PROCEDURE — 83880 ASSAY OF NATRIURETIC PEPTIDE: CPT

## 2020-10-30 PROCEDURE — C9803 HOPD COVID-19 SPEC COLLECT: HCPCS | Performed by: EMERGENCY MEDICINE

## 2020-10-30 PROCEDURE — 87205 SMEAR GRAM STAIN: CPT

## 2020-10-30 PROCEDURE — 85379 FIBRIN DEGRADATION QUANT: CPT

## 2020-10-30 PROCEDURE — 81001 URINALYSIS AUTO W/SCOPE: CPT

## 2020-10-30 PROCEDURE — 99223 1ST HOSP IP/OBS HIGH 75: CPT | Performed by: INTERNAL MEDICINE

## 2020-10-30 PROCEDURE — 85610 PROTHROMBIN TIME: CPT | Mod: 91

## 2020-10-30 PROCEDURE — 700101 HCHG RX REV CODE 250: Performed by: STUDENT IN AN ORGANIZED HEALTH CARE EDUCATION/TRAINING PROGRAM

## 2020-10-30 PROCEDURE — 73590 X-RAY EXAM OF LOWER LEG: CPT | Mod: LT

## 2020-10-30 PROCEDURE — 82570 ASSAY OF URINE CREATININE: CPT

## 2020-10-30 PROCEDURE — 96367 TX/PROPH/DG ADDL SEQ IV INF: CPT

## 2020-10-30 PROCEDURE — 80053 COMPREHEN METABOLIC PANEL: CPT

## 2020-10-30 PROCEDURE — 83615 LACTATE (LD) (LDH) ENZYME: CPT

## 2020-10-30 PROCEDURE — 700111 HCHG RX REV CODE 636 W/ 250 OVERRIDE (IP)

## 2020-10-30 PROCEDURE — 85384 FIBRINOGEN ACTIVITY: CPT

## 2020-10-30 PROCEDURE — 85025 COMPLETE CBC W/AUTO DIFF WBC: CPT

## 2020-10-30 PROCEDURE — 83036 HEMOGLOBIN GLYCOSYLATED A1C: CPT

## 2020-10-30 PROCEDURE — 82550 ASSAY OF CK (CPK): CPT

## 2020-10-30 PROCEDURE — 87147 CULTURE TYPE IMMUNOLOGIC: CPT

## 2020-10-30 PROCEDURE — 85007 BL SMEAR W/DIFF WBC COUNT: CPT

## 2020-10-30 PROCEDURE — 84300 ASSAY OF URINE SODIUM: CPT

## 2020-10-30 PROCEDURE — 84540 ASSAY OF URINE/UREA-N: CPT

## 2020-10-30 PROCEDURE — 93925 LOWER EXTREMITY STUDY: CPT

## 2020-10-30 PROCEDURE — 93970 EXTREMITY STUDY: CPT

## 2020-10-30 PROCEDURE — 82977 ASSAY OF GGT: CPT

## 2020-10-30 PROCEDURE — 84156 ASSAY OF PROTEIN URINE: CPT

## 2020-10-30 RX ORDER — BISACODYL 10 MG
10 SUPPOSITORY, RECTAL RECTAL
Status: DISCONTINUED | OUTPATIENT
Start: 2020-10-30 | End: 2020-11-16 | Stop reason: HOSPADM

## 2020-10-30 RX ORDER — SODIUM CHLORIDE 9 MG/ML
500 INJECTION, SOLUTION INTRAVENOUS ONCE
Status: COMPLETED | OUTPATIENT
Start: 2020-10-30 | End: 2020-10-30

## 2020-10-30 RX ORDER — AMIODARONE HYDROCHLORIDE 200 MG/1
200 TABLET ORAL EVERY MORNING
Status: DISCONTINUED | OUTPATIENT
Start: 2020-10-30 | End: 2020-11-16 | Stop reason: HOSPADM

## 2020-10-30 RX ORDER — MORPHINE SULFATE 4 MG/ML
4 INJECTION, SOLUTION INTRAMUSCULAR; INTRAVENOUS EVERY 4 HOURS PRN
Status: DISCONTINUED | OUTPATIENT
Start: 2020-10-30 | End: 2020-10-31

## 2020-10-30 RX ORDER — SODIUM CHLORIDE 9 MG/ML
500 INJECTION, SOLUTION INTRAVENOUS ONCE
Status: COMPLETED | OUTPATIENT
Start: 2020-10-30 | End: 2020-10-31

## 2020-10-30 RX ORDER — DEXTROSE MONOHYDRATE 25 G/50ML
50 INJECTION, SOLUTION INTRAVENOUS
Status: DISCONTINUED | OUTPATIENT
Start: 2020-10-30 | End: 2020-11-05

## 2020-10-30 RX ORDER — ROSUVASTATIN CALCIUM 20 MG/1
40 TABLET, COATED ORAL EVERY MORNING
Status: DISCONTINUED | OUTPATIENT
Start: 2020-10-30 | End: 2020-10-30

## 2020-10-30 RX ORDER — IPRATROPIUM BROMIDE AND ALBUTEROL SULFATE 2.5; .5 MG/3ML; MG/3ML
3 SOLUTION RESPIRATORY (INHALATION)
Status: DISCONTINUED | OUTPATIENT
Start: 2020-10-30 | End: 2020-11-16 | Stop reason: HOSPADM

## 2020-10-30 RX ORDER — ACETAMINOPHEN 325 MG/1
650 TABLET ORAL EVERY 6 HOURS PRN
Status: DISCONTINUED | OUTPATIENT
Start: 2020-10-30 | End: 2020-10-30

## 2020-10-30 RX ORDER — SODIUM CHLORIDE, SODIUM LACTATE, POTASSIUM CHLORIDE, CALCIUM CHLORIDE 600; 310; 30; 20 MG/100ML; MG/100ML; MG/100ML; MG/100ML
500 INJECTION, SOLUTION INTRAVENOUS ONCE
Status: COMPLETED | OUTPATIENT
Start: 2020-10-30 | End: 2020-10-30

## 2020-10-30 RX ORDER — ALBUTEROL SULFATE 90 UG/1
2 AEROSOL, METERED RESPIRATORY (INHALATION) EVERY 6 HOURS PRN
Status: DISCONTINUED | OUTPATIENT
Start: 2020-10-30 | End: 2020-11-15

## 2020-10-30 RX ORDER — SODIUM CHLORIDE, SODIUM LACTATE, POTASSIUM CHLORIDE, CALCIUM CHLORIDE 600; 310; 30; 20 MG/100ML; MG/100ML; MG/100ML; MG/100ML
INJECTION, SOLUTION INTRAVENOUS CONTINUOUS
Status: DISCONTINUED | OUTPATIENT
Start: 2020-10-30 | End: 2020-10-30

## 2020-10-30 RX ORDER — POLYETHYLENE GLYCOL 3350 17 G/17G
1 POWDER, FOR SOLUTION ORAL
Status: DISCONTINUED | OUTPATIENT
Start: 2020-10-30 | End: 2020-11-16 | Stop reason: HOSPADM

## 2020-10-30 RX ORDER — CLINDAMYCIN PHOSPHATE 900 MG/50ML
900 INJECTION, SOLUTION INTRAVENOUS EVERY 8 HOURS
Status: DISCONTINUED | OUTPATIENT
Start: 2020-10-30 | End: 2020-10-30

## 2020-10-30 RX ORDER — LINEZOLID 2 MG/ML
600 INJECTION, SOLUTION INTRAVENOUS EVERY 12 HOURS
Status: DISCONTINUED | OUTPATIENT
Start: 2020-10-30 | End: 2020-10-30

## 2020-10-30 RX ORDER — HYDROCODONE BITARTRATE AND ACETAMINOPHEN 10; 325 MG/1; MG/1
1 TABLET ORAL EVERY 6 HOURS PRN
Status: DISCONTINUED | OUTPATIENT
Start: 2020-10-30 | End: 2020-11-14

## 2020-10-30 RX ORDER — AMOXICILLIN 250 MG
2 CAPSULE ORAL 2 TIMES DAILY
Status: DISCONTINUED | OUTPATIENT
Start: 2020-10-30 | End: 2020-11-16 | Stop reason: HOSPADM

## 2020-10-30 RX ORDER — ACETAMINOPHEN 500 MG
500 TABLET ORAL EVERY 6 HOURS PRN
Status: DISCONTINUED | OUTPATIENT
Start: 2020-10-30 | End: 2020-11-16 | Stop reason: HOSPADM

## 2020-10-30 RX ORDER — SODIUM CHLORIDE 9 MG/ML
INJECTION, SOLUTION INTRAVENOUS CONTINUOUS
Status: DISCONTINUED | OUTPATIENT
Start: 2020-10-30 | End: 2020-10-30

## 2020-10-30 RX ORDER — CLONIDINE HYDROCHLORIDE 0.1 MG/1
0.1 TABLET ORAL EVERY 6 HOURS PRN
Status: DISCONTINUED | OUTPATIENT
Start: 2020-10-30 | End: 2020-11-16 | Stop reason: HOSPADM

## 2020-10-30 RX ORDER — LINEZOLID 2 MG/ML
600 INJECTION, SOLUTION INTRAVENOUS EVERY 12 HOURS
Status: DISCONTINUED | OUTPATIENT
Start: 2020-10-30 | End: 2020-10-31

## 2020-10-30 RX ADMIN — AMIODARONE HYDROCHLORIDE 200 MG: 200 TABLET ORAL at 06:21

## 2020-10-30 RX ADMIN — LINEZOLID 600 MG: 600 INJECTION, SOLUTION INTRAVENOUS at 06:29

## 2020-10-30 RX ADMIN — FENTANYL CITRATE 50 MCG: 50 INJECTION, SOLUTION INTRAMUSCULAR; INTRAVENOUS at 00:57

## 2020-10-30 RX ADMIN — SODIUM CHLORIDE 500 ML: 9 INJECTION, SOLUTION INTRAVENOUS at 15:22

## 2020-10-30 RX ADMIN — VANCOMYCIN HYDROCHLORIDE 3 G: 500 INJECTION, POWDER, LYOPHILIZED, FOR SOLUTION INTRAVENOUS at 02:04

## 2020-10-30 RX ADMIN — LINEZOLID 600 MG: 600 INJECTION, SOLUTION INTRAVENOUS at 18:24

## 2020-10-30 RX ADMIN — METOPROLOL TARTRATE 12.5 MG: 25 TABLET, FILM COATED ORAL at 18:17

## 2020-10-30 RX ADMIN — SODIUM CHLORIDE: 9 INJECTION, SOLUTION INTRAVENOUS at 08:10

## 2020-10-30 RX ADMIN — METOPROLOL TARTRATE 12.5 MG: 25 TABLET, FILM COATED ORAL at 11:54

## 2020-10-30 RX ADMIN — CEFEPIME 2 G: 2 INJECTION, POWDER, FOR SOLUTION INTRAVENOUS at 22:55

## 2020-10-30 RX ADMIN — MORPHINE SULFATE 4 MG: 4 INJECTION INTRAVENOUS at 11:54

## 2020-10-30 RX ADMIN — METRONIDAZOLE 500 MG: 500 INJECTION, SOLUTION INTRAVENOUS at 21:36

## 2020-10-30 RX ADMIN — METRONIDAZOLE 500 MG: 500 INJECTION, SOLUTION INTRAVENOUS at 00:44

## 2020-10-30 RX ADMIN — METRONIDAZOLE 500 MG: 500 INJECTION, SOLUTION INTRAVENOUS at 15:21

## 2020-10-30 RX ADMIN — CLINDAMYCIN IN 5 PERCENT DEXTROSE 900 MG: 18 INJECTION, SOLUTION INTRAVENOUS at 08:10

## 2020-10-30 RX ADMIN — CEFEPIME 2 G: 2 INJECTION, POWDER, FOR SOLUTION INTRAVENOUS at 13:43

## 2020-10-30 RX ADMIN — HYDROCODONE BITARTRATE AND ACETAMINOPHEN 1 TABLET: 10; 325 TABLET ORAL at 05:14

## 2020-10-30 RX ADMIN — SODIUM CHLORIDE, POTASSIUM CHLORIDE, SODIUM LACTATE AND CALCIUM CHLORIDE 500 ML: 600; 310; 30; 20 INJECTION, SOLUTION INTRAVENOUS at 10:08

## 2020-10-30 RX ADMIN — CLINDAMYCIN IN 5 PERCENT DEXTROSE 900 MG: 18 INJECTION, SOLUTION INTRAVENOUS at 00:11

## 2020-10-30 RX ADMIN — SODIUM CHLORIDE 500 ML: 9 INJECTION, SOLUTION INTRAVENOUS at 17:30

## 2020-10-30 RX ADMIN — HYDROCODONE BITARTRATE AND ACETAMINOPHEN 1 TABLET: 10; 325 TABLET ORAL at 20:32

## 2020-10-30 RX ADMIN — SODIUM CHLORIDE 500 ML: 9 INJECTION, SOLUTION INTRAVENOUS at 03:12

## 2020-10-30 RX ADMIN — SODIUM CHLORIDE, POTASSIUM CHLORIDE, SODIUM LACTATE AND CALCIUM CHLORIDE: 600; 310; 30; 20 INJECTION, SOLUTION INTRAVENOUS at 06:20

## 2020-10-30 RX ADMIN — CEFTRIAXONE SODIUM 2 G: 2 INJECTION, POWDER, FOR SOLUTION INTRAMUSCULAR; INTRAVENOUS at 00:03

## 2020-10-30 ASSESSMENT — ENCOUNTER SYMPTOMS
FEVER: 0
BLURRED VISION: 0
DIARRHEA: 0
LOSS OF CONSCIOUSNESS: 0
BRUISES/BLEEDS EASILY: 1
ABDOMINAL PAIN: 0
VOMITING: 0
EYE DISCHARGE: 0
ORTHOPNEA: 0
HEADACHES: 0
NAUSEA: 0
POLYDIPSIA: 0
COUGH: 1
SHORTNESS OF BREATH: 1
EYE PAIN: 0
MYALGIAS: 1
BLOOD IN STOOL: 0
SPUTUM PRODUCTION: 1
FALLS: 0
SORE THROAT: 0
PALPITATIONS: 0
CHILLS: 0
PND: 0

## 2020-10-30 ASSESSMENT — LIFESTYLE VARIABLES: SUBSTANCE_ABUSE: 0

## 2020-10-30 ASSESSMENT — COPD QUESTIONNAIRES
HAVE YOU SMOKED AT LEAST 100 CIGARETTES IN YOUR ENTIRE LIFE: YES
DURING THE PAST 4 WEEKS HOW MUCH DID YOU FEEL SHORT OF BREATH: SOME OF THE TIME
COPD SCREENING SCORE: 7
DO YOU EVER COUGH UP ANY MUCUS OR PHLEGM?: YES, EVERY DAY

## 2020-10-30 ASSESSMENT — PAIN DESCRIPTION - PAIN TYPE
TYPE: ACUTE PAIN
TYPE: ACUTE PAIN

## 2020-10-30 NOTE — PROGRESS NOTES
Angelica from [facility] called with critical result of positive blood culture at 1300. Critical lab result read back to Angelica.   Dr. Dennis notified of critical lab result at 1300.  Critical lab result read back by Dr. Dennis.
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
## 2020-10-30 NOTE — CONSULTS
"10/30/2020    Reason for consultation: Bilateral leg cellulitis    Consultation on Joshua Medrano at the request of Dr. Allen for bilateral leg infections.  The patient is a 54 y.o. male who presents with bilateral lower extremity infections which he states has been present for about a week.  His wife who was at bedside earlier reported to staff that his legs notably worsened in the past 24 hours.  He states that his legs have somewhat waxed and waned in appearance over the past week.  He states the redness \"comes and goes.\"  He is not a particularly good historian, not answering questions very directly, and multiple times during the encounter began referring to a brown recluse spider bite he sustained 15 years ago.  He states the wounds from this brown recluse bite had nearly healed when this began.  On lab evaluation he was found to have an elevated lactate and multiple other laboratory abnormalities.  His troponins are elevated.  He was hypotensive in the ER.  Orthopedics was consulted for concern for necrotizing fasciitis.  He endorses pain in his left lower extremity distal to the knee predominantly in the areas of erythema.    Past Medical History:   Diagnosis Date   • ASTHMA    • Atrial fibrillation (HCC)    • CAD (coronary artery disease)    • Chronic obstructive pulmonary disease (HCC)    • Congestive heart failure (HCC)    • Diabetes        Past Surgical History:   Procedure Laterality Date   • COLONOSCOPY - ENDO N/A 7/25/2018    Procedure: COLONOSCOPY - ENDO;  Surgeon: Josiah Molina D.O.;  Location: ENDOSCOPY [facility];  Service: Gastroenterology   • THORACOSCOPY Left 1/25/2018    Procedure: THORACOSCOPY- PLEURODESIS ;  Surgeon: Chandu Paez M.D.;  Location: SURGERY [facility];  Service: General   • MULTIPLE CORONARY ARTERY BYPASS ENDO VEIN HARVEST  12/22/2017    Procedure: MULTIPLE CORONARY ARTERY BYPASS ENDO VEIN HARVEST X2;  Surgeon: Kiel Ash M.D.;  Location: SURGERY [facility]" ORS;  Service: Cardiothoracic   • JIM  12/22/2017    Procedure: JIM;  Surgeon: Kiel Ash M.D.;  Location: SURGERY [facility] ORS;  Service: Cardiothoracic   • OTHER ABDOMINAL SURGERY         Medications  No current facility-administered medications on file prior to encounter.      Current Outpatient Medications on File Prior to Encounter   Medication Sig Dispense Refill   • warfarin (COUMADIN) 5 MG Tab TAKE 1 & 1/2 (ONE & ONE-HALF) TABLETS BY MOUTH ONCE DAILY OR  AS  DIRECTED  BY  COUMADIN  CLINIC 45 Tab 0   • rosuvastatin (CRESTOR) 40 MG tablet Take 1 Tab by mouth every morning. 90 Tab 3   • furosemide (LASIX) 40 MG Tab Take 1 Tab by mouth every morning. 90 Tab 3   • spironolactone (ALDACTONE) 25 MG Tab Take 1 Tab by mouth every morning. 90 Tab 3   • metoprolol SR (TOPROL XL) 25 MG TABLET SR 24 HR Take 1 Tab by mouth every morning. 90 Tab 3   • amiodarone (CORDARONE) 200 MG Tab Take 1 Tab by mouth every morning. 90 Tab 3   • lisinopril (PRINIVIL) 2.5 MG Tab Take 1 Tab by mouth every morning. 90 Tab 3   • potassium chloride SA (K-DUR) 10 MEQ Tab CR Take 1 Tab by mouth every morning. 90 Each 3   • acetaminophen (TYLENOL) 325 MG Tab Take 2 Tabs by mouth every 6 hours as needed (Mild Pain; (Pain scale 1-3); Temp greater than 100.5 F). 30 Tab 0   • albuterol 108 (90 Base) MCG/ACT Aero Soln inhalation aerosol Inhale 2 Puffs by mouth every 6 hours as needed for Shortness of Breath.     • ferrous sulfate 325 (65 Fe) MG tablet Take 325 mg by mouth every morning.     • polyethylene glycol/lytes (MIRALAX) Pack Take 17 g by mouth every morning.     • tiotropium (SPIRIVA) 18 MCG Cap Inhale 18 mcg by mouth every morning.     • ipratropium-albuterol (DUONEB) 0.5-2.5 (3) MG/3ML nebulizer solution 3 mL by Nebulization route every 6 hours as needed for Shortness of Breath. 180 Bullet 10       Allergies  Cillins [penicillins], Erythromycin, Shellfish allergy, and Metoprolol    ROS  Per HPI otherwise reviewed and negative. All  "other systems were reviewed and found to be negative    Family History   Problem Relation Age of Onset   • Diabetes Mother    • Stroke Mother    • Leukemia Mother    • Diabetes Father    • No Known Problems Sister    • Heart Disease Brother         PPM   • Lung Disease Brother    • Alcohol/Drug Brother    • Diabetes Sister        Social History     Socioeconomic History   • Marital status:      Spouse name: Not on file   • Number of children: Not on file   • Years of education: Not on file   • Highest education level: Not on file   Occupational History   • Not on file   Social Needs   • Financial resource strain: Not on file   • Food insecurity     Worry: Not on file     Inability: Not on file   • Transportation needs     Medical: Not on file     Non-medical: Not on file   Tobacco Use   • Smoking status: Former Smoker     Packs/day: 0.00     Years: 30.00     Pack years: 0.00     Types: Cigars, Cigarettes     Quit date: 9/11/2017     Years since quitting: 3.1   • Smokeless tobacco: Never Used   Substance and Sexual Activity   • Alcohol use: No   • Drug use: No   • Sexual activity: Not on file   Lifestyle   • Physical activity     Days per week: Not on file     Minutes per session: Not on file   • Stress: Not on file   Relationships   • Social connections     Talks on phone: Not on file     Gets together: Not on file     Attends Rastafari service: Not on file     Active member of club or organization: Not on file     Attends meetings of clubs or organizations: Not on file     Relationship status: Not on file   • Intimate partner violence     Fear of current or ex partner: Not on file     Emotionally abused: Not on file     Physically abused: Not on file     Forced sexual activity: Not on file   Other Topics Concern   • Not on file   Social History Narrative   • Not on file       Physical Exam  Vitals  /56   Pulse 88   Temp 36.8 °C (98.2 °F) (Temporal)   Resp (!) 30   Ht 1.956 m (6' 5\")   Wt (!) 127 " kg (280 lb)   SpO2 (!) 77%   General: Obese, disheveled  Psychiatric: Alert and oriented x3, somewhat appropriate responses to questions  HEENT: Normocephalic, atraumatic  Eyes: Anicteric, PERRL   Neck: Midline trachea no pain with range of motion  Chest: Symmetric expansion of the chest wall, increased respiratory rate and work of breathing  Heart: RRR, palpable peripheral pulses  Abdomen: Soft, NT, ND  Skin: Circumferential erythema left lower extremity extending to the medial left thigh.  Some oozing purulence from superficial wounds in the anterior aspect of the left ankle.  On the right leg he has multiple large areas of chronic appearing eschar  Extremities: Notable swelling of bilateral lower extremities left worse than right.  He has erythema extending on the medial aspect of the left thigh he is not tender here.  He is tender in the leg distal to the knee in the areas of erythema.  There is no fluctuance in the lower extremities.  No or crepitus with palpation of the tissues.  He is not in exquisite pain but has some tenderness in the area of erythema.  Neuro: Intact sensation in the toes intact motor function tibialis anterior gastrocsoleus complex EHL peroneals  Vascular: Warm and well-perfused feet brisk capillary refill I cannot palpate distal pulses although this may be secondary to his edema, Capillary refill <2 seconds    Radiographs:  US-EXTREMITY ARTERY LOWER BILAT   Final Result      US-EXTREMITY VENOUS LOWER BILAT   Final Result      DX-TIBIA AND FIBULA RIGHT   Final Result         1.  No acute traumatic bony injury.   2.  Tricompartmental degenerative changes of the knee   3.  Atherosclerosis      DX-TIBIA AND FIBULA LEFT   Final Result   Addendum 1 of 1   Addendum: Subtle clustered punctate lucencies lateral to the ankle are    seen which could represent tiny foci of soft tissue gas.      These findings were discussed with the patient's clinician, ELIZABETH HILARIO,    on 10/30/2020 12:34 AM.       Final      DX-CHEST-PORTABLE (1 VIEW)   Final Result         1.  Interstitial pulmonary parenchymal prominence, compatible with interstitial edema and/or infiltrates.   2.  Cardiomegaly          Laboratory Values  Recent Labs     10/29/20  2238   WBC 19.7*   RBC 7.10*   HEMOGLOBIN 20.7*   HEMATOCRIT 65.6*   MCV 92.3   MCH 29.4   MCHC 31.9*   RDW 62.4*   PLATELETCT 122*   MPV 11.2     Recent Labs     10/29/20  2238 10/30/20  0415   SODIUM 128*  --    POTASSIUM 4.5  --    CHLORIDE 91*  --    CO2 22  --    GLUCOSE 142*  --    BUN 41*  --    CPKTOTAL  --  381*     Recent Labs     10/30/20  0100   APTT 68.0*   INR 4.46*         Impression:    #1  Severe left lower extremity cellulitis  #2  Right lower extremity with chronic wounds of unknown age    Plan:    For now I would recommend an initial attempt at medical management of the cellulitis.  If his cellulitis worsens or does not improve we could consider surgery however; this would likely require extensive debridement or possibly amputation potentially as high as an above-knee amputation bilaterally.  A CT of the lower extremities could be obtained if he does not improve, and if focal fluctuance is identified, an I&D could be considered.  If he worsens clinically or there is spreading of his blistering proximally, we could consider debridement or amputation.

## 2020-10-30 NOTE — ASSESSMENT & PLAN NOTE
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

## 2020-10-30 NOTE — ED NOTES
Unable to complete med rec at this time. Pt does not know his current medications. Med rec will follow up with pt home pharmacy when the reopen.

## 2020-10-30 NOTE — ASSESSMENT & PLAN NOTE
Chronic  Likely 2/2 chronic hypoxia due to COPD vs SHASHANK  None currently   Following intermittently

## 2020-10-30 NOTE — ASSESSMENT & PLAN NOTE
Rate controlled on metoprolol, amiodarone  [id number]: 4 points; HASBLED: 2 points.  Continue amiodarone  following

## 2020-10-30 NOTE — PROGRESS NOTES
[facility] TRIAGE OFFICER ER REPORT  Consult/Admission requested by: Dr Allen  Chief complaint: bilateral swelling and pain  Pertinent history/ER Course: Patient present to ED due to bilateral lower extremity swelling and redness for the last 1 week. Imaging reveals no acute blockages or DVT. Patient's vitals labile.   Code Status: Full per previous admission. Patient is poor historian according to ERP.  Patient meets admission criterion: Yes..  Recommendations given or work up & consultations requested per triage officer: Antibiotics for cellulitis, monitor vitals  Consultants involved and pertinent input from consultants: Defer to admitting team  Admission status: To be reviewed by case management and admitting physician.   Admission order placed: To be placed by admitting physician.   Floor requested: To be determined by admitting physician  Assigned hospitalist: Patient is [facility] IM patient. Signed out to Dr. Vásquez, [facility] senior resident who will call ERP and assume care of this patient.
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
## 2020-10-30 NOTE — PROGRESS NOTES
Pt arrived on floor. Tele monitor on and monitor room notified. Pt currently in [facility] waiting for bed and vitals to be taken.

## 2020-10-30 NOTE — ED NOTES
Attempted to retrieve pt from lobby. Pt still in bathroom with caregiver. Triage staff instructed to alert this RN when pt is finished in bathroom to be roomed. Charge updated on rooming delay

## 2020-10-30 NOTE — PROGRESS NOTES
2 RN skin check complete.   Devices in place n/a  Skin assessed under devices intact.  Confirmed pressure ulcers found on n/a  New potential pressure ulcers noted on n/a but bilateral lower extremity wounds present. Wound consult placed yes, pictures uploaded.    Generalized bruising and scrapes  Right index finger wound, pink, excoriated  Bilateral lower extremity wounds, black, painful, red, drainage, blisters, open, pealing  Right big toe wound, black   Sacrum red, blanching   Bilateral ears pink, blanching

## 2020-10-30 NOTE — ED NOTES
Pt assisted onto R side per request and propped up with pillows for comfort. Pt instructed to alert nursing staff of any changes in condition.

## 2020-10-30 NOTE — ED NOTES
Attempted to  from lobby to room pt. Pt occupied in bathroom with caregiver. Will attempt to retrieve pt again after finished in restroom.
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
## 2020-10-30 NOTE — ASSESSMENT & PLAN NOTE
RESOLVED  -At admission INR 4.46, Likely from sepsis  -Warfarin held on admission  -11/3: INR 1.93  -Changed to heparin q8h effective 11/11/2020, will hold before surgery 11/12
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
## 2020-10-30 NOTE — CARE PLAN
Problem: Communication  Goal: The ability to communicate needs accurately and effectively will improve  Outcome: PROGRESSING AS EXPECTED     Problem: Safety  Goal: Will remain free from injury  Outcome: PROGRESSING AS EXPECTED     Problem: Infection  Goal: Will remain free from infection  Outcome: PROGRESSING AS EXPECTED     Problem: Knowledge Deficit  Goal: Knowledge of disease process/condition, treatment plan, diagnostic tests, and medications will improve  Outcome: PROGRESSING AS EXPECTED     Problem: Respiratory:  Goal: Respiratory status will improve  Outcome: PROGRESSING AS EXPECTED

## 2020-10-30 NOTE — ASSESSMENT & PLAN NOTE
Chronically elevated, asymptomatic and EKG without ST changes at this time.    -EKG prn chest pain

## 2020-10-31 ENCOUNTER — APPOINTMENT (OUTPATIENT)
Dept: RADIOLOGY | Facility: MEDICAL CENTER | Age: 55
DRG: 853 | End: 2020-10-31
Attending: ORTHOPAEDIC SURGERY
Payer: MEDICARE

## 2020-10-31 ENCOUNTER — APPOINTMENT (OUTPATIENT)
Dept: RADIOLOGY | Facility: MEDICAL CENTER | Age: 55
DRG: 853 | End: 2020-10-31
Attending: STUDENT IN AN ORGANIZED HEALTH CARE EDUCATION/TRAINING PROGRAM
Payer: MEDICARE

## 2020-10-31 ENCOUNTER — APPOINTMENT (OUTPATIENT)
Dept: CARDIOLOGY | Facility: MEDICAL CENTER | Age: 55
DRG: 853 | End: 2020-10-31
Attending: STUDENT IN AN ORGANIZED HEALTH CARE EDUCATION/TRAINING PROGRAM
Payer: MEDICARE

## 2020-10-31 PROBLEM — I50.810 RIGHT-SIDED HEART FAILURE (HCC): Status: ACTIVE | Noted: 2020-10-31

## 2020-10-31 LAB
ACTION RANGE TRIGGERED IACRT: NO
ANION GAP SERPL CALC-SCNC: 11 MMOL/L (ref 7–16)
ANION GAP SERPL CALC-SCNC: 11 MMOL/L (ref 7–16)
ANION GAP SERPL CALC-SCNC: 13 MMOL/L (ref 7–16)
ANION GAP SERPL CALC-SCNC: 15 MMOL/L (ref 7–16)
ANION GAP SERPL CALC-SCNC: 16 MMOL/L (ref 7–16)
BASE EXCESS BLDA CALC-SCNC: -7 MMOL/L (ref -4–3)
BASOPHILS # BLD AUTO: 0.5 % (ref 0–1.8)
BASOPHILS # BLD AUTO: 0.6 % (ref 0–1.8)
BASOPHILS # BLD: 0.11 K/UL (ref 0–0.12)
BASOPHILS # BLD: 0.12 K/UL (ref 0–0.12)
BODY TEMPERATURE: ABNORMAL DEGREES
BUN SERPL-MCNC: 48 MG/DL (ref 8–22)
BUN SERPL-MCNC: 49 MG/DL (ref 8–22)
BUN SERPL-MCNC: 52 MG/DL (ref 8–22)
BUN SERPL-MCNC: 55 MG/DL (ref 8–22)
BUN SERPL-MCNC: 56 MG/DL (ref 8–22)
CALCIUM SERPL-MCNC: 7.6 MG/DL (ref 8.5–10.5)
CALCIUM SERPL-MCNC: 8.1 MG/DL (ref 8.5–10.5)
CALCIUM SERPL-MCNC: 8.2 MG/DL (ref 8.5–10.5)
CALCIUM SERPL-MCNC: 8.2 MG/DL (ref 8.5–10.5)
CALCIUM SERPL-MCNC: 8.3 MG/DL (ref 8.5–10.5)
CHLORIDE SERPL-SCNC: 93 MMOL/L (ref 96–112)
CHLORIDE SERPL-SCNC: 94 MMOL/L (ref 96–112)
CHLORIDE SERPL-SCNC: 95 MMOL/L (ref 96–112)
CHLORIDE SERPL-SCNC: 95 MMOL/L (ref 96–112)
CHLORIDE SERPL-SCNC: 98 MMOL/L (ref 96–112)
CO2 BLDA-SCNC: 19 MMOL/L (ref 20–33)
CO2 SERPL-SCNC: 16 MMOL/L (ref 20–33)
CO2 SERPL-SCNC: 18 MMOL/L (ref 20–33)
CO2 SERPL-SCNC: 18 MMOL/L (ref 20–33)
CO2 SERPL-SCNC: 19 MMOL/L (ref 20–33)
CO2 SERPL-SCNC: 19 MMOL/L (ref 20–33)
CREAT SERPL-MCNC: 1.91 MG/DL (ref 0.5–1.4)
CREAT SERPL-MCNC: 1.94 MG/DL (ref 0.5–1.4)
CREAT SERPL-MCNC: 1.97 MG/DL (ref 0.5–1.4)
CREAT SERPL-MCNC: 2 MG/DL (ref 0.5–1.4)
CREAT SERPL-MCNC: 2.07 MG/DL (ref 0.5–1.4)
EOSINOPHIL # BLD AUTO: 0.08 K/UL (ref 0–0.51)
EOSINOPHIL # BLD AUTO: 0.1 K/UL (ref 0–0.51)
EOSINOPHIL NFR BLD: 0.4 % (ref 0–6.9)
EOSINOPHIL NFR BLD: 0.5 % (ref 0–6.9)
ERYTHROCYTE [DISTWIDTH] IN BLOOD BY AUTOMATED COUNT: 61.4 FL (ref 35.9–50)
ERYTHROCYTE [DISTWIDTH] IN BLOOD BY AUTOMATED COUNT: 62.5 FL (ref 35.9–50)
GLUCOSE BLD-MCNC: 114 MG/DL (ref 65–99)
GLUCOSE BLD-MCNC: 130 MG/DL (ref 65–99)
GLUCOSE BLD-MCNC: 152 MG/DL (ref 65–99)
GLUCOSE SERPL-MCNC: 107 MG/DL (ref 65–99)
GLUCOSE SERPL-MCNC: 114 MG/DL (ref 65–99)
GLUCOSE SERPL-MCNC: 119 MG/DL (ref 65–99)
GLUCOSE SERPL-MCNC: 127 MG/DL (ref 65–99)
GLUCOSE SERPL-MCNC: 159 MG/DL (ref 65–99)
HCO3 BLDA-SCNC: 17.7 MMOL/L (ref 17–25)
HCT VFR BLD AUTO: 57.2 % (ref 42–52)
HCT VFR BLD AUTO: 57.5 % (ref 42–52)
HGB BLD-MCNC: 18.6 G/DL (ref 14–18)
HGB BLD-MCNC: 18.8 G/DL (ref 14–18)
HOROWITZ INDEX BLDA+IHG-RTO: 3650 MM[HG]
IMM GRANULOCYTES # BLD AUTO: 0.45 K/UL (ref 0–0.11)
IMM GRANULOCYTES # BLD AUTO: 0.71 K/UL (ref 0–0.11)
IMM GRANULOCYTES NFR BLD AUTO: 2.3 % (ref 0–0.9)
IMM GRANULOCYTES NFR BLD AUTO: 3.3 % (ref 0–0.9)
INR PPP: 3.86 (ref 0.87–1.13)
INST. QUALIFIED PATIENT IIQPT: YES
LACTATE BLD-SCNC: 1.9 MMOL/L (ref 0.5–2)
LACTATE BLD-SCNC: 2 MMOL/L (ref 0.5–2)
LYMPHOCYTES # BLD AUTO: 0.24 K/UL (ref 1–4.8)
LYMPHOCYTES # BLD AUTO: 0.3 K/UL (ref 1–4.8)
LYMPHOCYTES NFR BLD: 1.2 % (ref 22–41)
LYMPHOCYTES NFR BLD: 1.4 % (ref 22–41)
MCH RBC QN AUTO: 29.5 PG (ref 27–33)
MCH RBC QN AUTO: 29.9 PG (ref 27–33)
MCHC RBC AUTO-ENTMCNC: 32.5 G/DL (ref 33.7–35.3)
MCHC RBC AUTO-ENTMCNC: 32.7 G/DL (ref 33.7–35.3)
MCV RBC AUTO: 90.8 FL (ref 81.4–97.8)
MCV RBC AUTO: 91.6 FL (ref 81.4–97.8)
MONOCYTES # BLD AUTO: 0.77 K/UL (ref 0–0.85)
MONOCYTES # BLD AUTO: 0.83 K/UL (ref 0–0.85)
MONOCYTES NFR BLD AUTO: 3.8 % (ref 0–13.4)
MONOCYTES NFR BLD AUTO: 3.9 % (ref 0–13.4)
NEUTROPHILS # BLD AUTO: 18.16 K/UL (ref 1.82–7.42)
NEUTROPHILS # BLD AUTO: 19.62 K/UL (ref 1.82–7.42)
NEUTROPHILS NFR BLD: 90.5 % (ref 44–72)
NEUTROPHILS NFR BLD: 91.6 % (ref 44–72)
NRBC # BLD AUTO: 0 K/UL
NRBC # BLD AUTO: 0 K/UL
NRBC BLD-RTO: 0 /100 WBC
NRBC BLD-RTO: 0 /100 WBC
NT-PROBNP SERPL IA-MCNC: ABNORMAL PG/ML (ref 0–125)
O2/TOTAL GAS SETTING VFR VENT: 2 %
PCO2 BLDA: 34.4 MMHG (ref 26–37)
PH BLDA: 7.32 [PH] (ref 7.4–7.5)
PLATELET # BLD AUTO: 102 K/UL (ref 164–446)
PLATELET # BLD AUTO: 109 K/UL (ref 164–446)
PMV BLD AUTO: 11 FL (ref 9–12.9)
PMV BLD AUTO: 11.8 FL (ref 9–12.9)
PO2 BLDA: 73 MMHG (ref 64–87)
POTASSIUM SERPL-SCNC: 4.2 MMOL/L (ref 3.6–5.5)
POTASSIUM SERPL-SCNC: 4.4 MMOL/L (ref 3.6–5.5)
POTASSIUM SERPL-SCNC: 4.5 MMOL/L (ref 3.6–5.5)
POTASSIUM SERPL-SCNC: 4.5 MMOL/L (ref 3.6–5.5)
POTASSIUM SERPL-SCNC: 5.2 MMOL/L (ref 3.6–5.5)
PROTHROMBIN TIME: 39.1 SEC (ref 12–14.6)
RBC # BLD AUTO: 6.28 M/UL (ref 4.7–6.1)
RBC # BLD AUTO: 6.3 M/UL (ref 4.7–6.1)
SAO2 % BLDA: 93 % (ref 93–99)
SODIUM SERPL-SCNC: 123 MMOL/L (ref 135–145)
SODIUM SERPL-SCNC: 125 MMOL/L (ref 135–145)
SODIUM SERPL-SCNC: 127 MMOL/L (ref 135–145)
SODIUM SERPL-SCNC: 127 MMOL/L (ref 135–145)
SODIUM SERPL-SCNC: 129 MMOL/L (ref 135–145)
SPECIMEN DRAWN FROM PATIENT: ABNORMAL
WBC # BLD AUTO: 19.8 K/UL (ref 4.8–10.8)
WBC # BLD AUTO: 21.7 K/UL (ref 4.8–10.8)

## 2020-10-31 PROCEDURE — 83880 ASSAY OF NATRIURETIC PEPTIDE: CPT

## 2020-10-31 PROCEDURE — 73700 CT LOWER EXTREMITY W/O DYE: CPT | Mod: RT

## 2020-10-31 PROCEDURE — 93306 TTE W/DOPPLER COMPLETE: CPT | Mod: 26 | Performed by: INTERNAL MEDICINE

## 2020-10-31 PROCEDURE — 99233 SBSQ HOSP IP/OBS HIGH 50: CPT | Mod: GC | Performed by: INTERNAL MEDICINE

## 2020-10-31 PROCEDURE — 93306 TTE W/DOPPLER COMPLETE: CPT

## 2020-10-31 PROCEDURE — 700111 HCHG RX REV CODE 636 W/ 250 OVERRIDE (IP): Performed by: INTERNAL MEDICINE

## 2020-10-31 PROCEDURE — 94640 AIRWAY INHALATION TREATMENT: CPT

## 2020-10-31 PROCEDURE — 700105 HCHG RX REV CODE 258: Performed by: INTERNAL MEDICINE

## 2020-10-31 PROCEDURE — 700102 HCHG RX REV CODE 250 W/ 637 OVERRIDE(OP): Performed by: STUDENT IN AN ORGANIZED HEALTH CARE EDUCATION/TRAINING PROGRAM

## 2020-10-31 PROCEDURE — 82803 BLOOD GASES ANY COMBINATION: CPT

## 2020-10-31 PROCEDURE — 700101 HCHG RX REV CODE 250: Performed by: INTERNAL MEDICINE

## 2020-10-31 PROCEDURE — 700111 HCHG RX REV CODE 636 W/ 250 OVERRIDE (IP): Performed by: STUDENT IN AN ORGANIZED HEALTH CARE EDUCATION/TRAINING PROGRAM

## 2020-10-31 PROCEDURE — 82962 GLUCOSE BLOOD TEST: CPT

## 2020-10-31 PROCEDURE — 99291 CRITICAL CARE FIRST HOUR: CPT | Performed by: INTERNAL MEDICINE

## 2020-10-31 PROCEDURE — 700101 HCHG RX REV CODE 250: Performed by: STUDENT IN AN ORGANIZED HEALTH CARE EDUCATION/TRAINING PROGRAM

## 2020-10-31 PROCEDURE — 770022 HCHG ROOM/CARE - ICU (200)

## 2020-10-31 PROCEDURE — A9270 NON-COVERED ITEM OR SERVICE: HCPCS | Performed by: STUDENT IN AN ORGANIZED HEALTH CARE EDUCATION/TRAINING PROGRAM

## 2020-10-31 PROCEDURE — 83605 ASSAY OF LACTIC ACID: CPT

## 2020-10-31 PROCEDURE — 99222 1ST HOSP IP/OBS MODERATE 55: CPT | Performed by: NURSE PRACTITIONER

## 2020-10-31 PROCEDURE — 99233 SBSQ HOSP IP/OBS HIGH 50: CPT | Performed by: INTERNAL MEDICINE

## 2020-10-31 PROCEDURE — 80048 BASIC METABOLIC PNL TOTAL CA: CPT

## 2020-10-31 PROCEDURE — 73700 CT LOWER EXTREMITY W/O DYE: CPT | Mod: LT

## 2020-10-31 PROCEDURE — 85610 PROTHROMBIN TIME: CPT

## 2020-10-31 PROCEDURE — 700105 HCHG RX REV CODE 258: Performed by: STUDENT IN AN ORGANIZED HEALTH CARE EDUCATION/TRAINING PROGRAM

## 2020-10-31 PROCEDURE — 85025 COMPLETE CBC W/AUTO DIFF WBC: CPT | Mod: 91

## 2020-10-31 PROCEDURE — 36600 WITHDRAWAL OF ARTERIAL BLOOD: CPT

## 2020-10-31 PROCEDURE — 99222 1ST HOSP IP/OBS MODERATE 55: CPT | Performed by: INTERNAL MEDICINE

## 2020-10-31 PROCEDURE — 71045 X-RAY EXAM CHEST 1 VIEW: CPT

## 2020-10-31 RX ORDER — FUROSEMIDE 10 MG/ML
40 INJECTION INTRAMUSCULAR; INTRAVENOUS ONCE
Status: DISCONTINUED | OUTPATIENT
Start: 2020-10-31 | End: 2020-11-01

## 2020-10-31 RX ORDER — FUROSEMIDE 10 MG/ML
40 INJECTION INTRAMUSCULAR; INTRAVENOUS ONCE
Status: COMPLETED | OUTPATIENT
Start: 2020-10-31 | End: 2020-10-31

## 2020-10-31 RX ORDER — NOREPINEPHRINE BITARTRATE 0.03 MG/ML
0-30 INJECTION, SOLUTION INTRAVENOUS CONTINUOUS
Status: DISCONTINUED | OUTPATIENT
Start: 2020-10-31 | End: 2020-11-05

## 2020-10-31 RX ORDER — FUROSEMIDE 10 MG/ML
40 INJECTION INTRAMUSCULAR; INTRAVENOUS
Status: DISCONTINUED | OUTPATIENT
Start: 2020-10-31 | End: 2020-11-01

## 2020-10-31 RX ORDER — SODIUM CHLORIDE 9 MG/ML
INJECTION, SOLUTION INTRAVENOUS
Status: ACTIVE
Start: 2020-10-31 | End: 2020-11-01

## 2020-10-31 RX ORDER — SODIUM CHLORIDE 1 G/1
1 TABLET ORAL ONCE
Status: COMPLETED | OUTPATIENT
Start: 2020-10-31 | End: 2020-10-31

## 2020-10-31 RX ORDER — CLINDAMYCIN PHOSPHATE 900 MG/50ML
900 INJECTION, SOLUTION INTRAVENOUS EVERY 8 HOURS
Status: DISCONTINUED | OUTPATIENT
Start: 2020-10-31 | End: 2020-11-02

## 2020-10-31 RX ORDER — HYDROMORPHONE HYDROCHLORIDE 1 MG/ML
.5-1 INJECTION, SOLUTION INTRAMUSCULAR; INTRAVENOUS; SUBCUTANEOUS
Status: DISCONTINUED | OUTPATIENT
Start: 2020-10-31 | End: 2020-11-15

## 2020-10-31 RX ADMIN — LINEZOLID 600 MG: 600 INJECTION, SOLUTION INTRAVENOUS at 05:34

## 2020-10-31 RX ADMIN — Medication 1 G: at 02:46

## 2020-10-31 RX ADMIN — METRONIDAZOLE 500 MG: 500 INJECTION, SOLUTION INTRAVENOUS at 05:36

## 2020-10-31 RX ADMIN — METOPROLOL TARTRATE 12.5 MG: 25 TABLET, FILM COATED ORAL at 05:29

## 2020-10-31 RX ADMIN — AMIODARONE HYDROCHLORIDE 200 MG: 200 TABLET ORAL at 06:00

## 2020-10-31 RX ADMIN — NOREPINEPHRINE BITARTRATE 2 MCG/MIN: 1 INJECTION, SOLUTION, CONCENTRATE INTRAVENOUS at 22:59

## 2020-10-31 RX ADMIN — GLYCOPYRROLATE 1 CAPSULE: 15.6 CAPSULE RESPIRATORY (INHALATION) at 19:32

## 2020-10-31 RX ADMIN — CLINDAMYCIN IN 5 PERCENT DEXTROSE 900 MG: 18 INJECTION, SOLUTION INTRAVENOUS at 14:59

## 2020-10-31 RX ADMIN — FUROSEMIDE 40 MG: 10 INJECTION, SOLUTION INTRAMUSCULAR; INTRAVENOUS at 11:30

## 2020-10-31 RX ADMIN — DAPTOMYCIN 760 MG: 350 INJECTION, POWDER, LYOPHILIZED, FOR SOLUTION INTRAVENOUS at 16:13

## 2020-10-31 RX ADMIN — FUROSEMIDE 40 MG: 10 INJECTION, SOLUTION INTRAMUSCULAR; INTRAVENOUS at 12:12

## 2020-10-31 RX ADMIN — CLINDAMYCIN IN 5 PERCENT DEXTROSE 900 MG: 18 INJECTION, SOLUTION INTRAVENOUS at 22:15

## 2020-10-31 RX ADMIN — ALBUTEROL SULFATE 2 PUFF: 90 AEROSOL, METERED RESPIRATORY (INHALATION) at 19:35

## 2020-10-31 RX ADMIN — HYDROCODONE BITARTRATE AND ACETAMINOPHEN 1 TABLET: 10; 325 TABLET ORAL at 19:28

## 2020-10-31 RX ADMIN — CEFEPIME 2 G: 2 INJECTION, POWDER, FOR SOLUTION INTRAVENOUS at 05:36

## 2020-10-31 RX ADMIN — HYDROMORPHONE HYDROCHLORIDE 1 MG: 1 INJECTION, SOLUTION INTRAMUSCULAR; INTRAVENOUS; SUBCUTANEOUS at 13:44

## 2020-10-31 ASSESSMENT — ENCOUNTER SYMPTOMS
BRUISES/BLEEDS EASILY: 0
STRIDOR: 0
SENSORY CHANGE: 0
CONSTITUTIONAL NEGATIVE: 1
POLYDIPSIA: 0
WHEEZING: 0
SINUS PAIN: 0
EYES NEGATIVE: 1
NERVOUS/ANXIOUS: 1
MYALGIAS: 1
DIZZINESS: 0
PSYCHIATRIC NEGATIVE: 1
SPEECH CHANGE: 0
EYE DISCHARGE: 0
PHOTOPHOBIA: 0
COUGH: 0
NECK PAIN: 0
WEIGHT LOSS: 0
SHORTNESS OF BREATH: 1
BLURRED VISION: 0
TINGLING: 0
DEPRESSION: 0
SPUTUM PRODUCTION: 0
HEMOPTYSIS: 0
EYE PAIN: 0
DIAPHORESIS: 0
ABDOMINAL PAIN: 0
BLOOD IN STOOL: 0
BACK PAIN: 0
NAUSEA: 0
DOUBLE VISION: 0
FOCAL WEAKNESS: 0
NERVOUS/ANXIOUS: 0
LOSS OF CONSCIOUSNESS: 0
CHILLS: 0
VOMITING: 0
MYALGIAS: 0
HEADACHES: 0
HEARTBURN: 0
PALPITATIONS: 0
GASTROINTESTINAL NEGATIVE: 1
FEVER: 0
MUSCULOSKELETAL NEGATIVE: 1
NEUROLOGICAL NEGATIVE: 1

## 2020-10-31 ASSESSMENT — PAIN DESCRIPTION - PAIN TYPE: TYPE: ACUTE PAIN

## 2020-10-31 NOTE — PROGRESS NOTES
Orthopaedic Inpatient Progress Note    Subjective:  No acute events overnight.  Pain controlled.  Patient states that he feels a little better than he did yesterday.    Objective:    Vital Signs:     Wt Readings from Last 1 Encounters:   10/29/20 (!) 127 kg (280 lb)       Intake/Output (Yesterday):  10/30 0700 - 10/31 0659  In: 590 [P.O.:240]  Out: 500 [Urine:500]    Physical Exam:  General: Well appearing, no acute distress  LLE: diffuse edema and erythema circumferentially about leg up to medial thigh.  Erythema has not progressed outside of marked line, no fluctuance to palpation and not crepitus. Tender about lower leg, but he states this is improved from prior. Compartments are full, but compressible. Significant cellulitis with serosanguinous drainage from mid calf and ankle area. Fires ehl/fhl/g/s/ta without pain. SILT s/s/sp/dp/t, unable to palpate pulses distally but <2 sec cap refill    Laboratory data:  Results for JARRETT WHITE (MRN [id number]) as of 10/31/2020 11:44   Ref. Range 10/30/2020 22:14 10/30/2020 23:17 10/31/2020 02:58 10/31/2020 04:56   WBC Latest Ref Range: 4.8 - 10.8 K/uL 22.1 (H)  21.7 (H) 19.8 (H)   RBC Latest Ref Range: 4.70 - 6.10 M/uL 6.38 (H)  6.28 (H) 6.30 (H)   Hemoglobin Latest Ref Range: 14.0 - 18.0 g/dL 18.6 (H)  18.8 (H) 18.6 (H)   Hematocrit Latest Ref Range: 42.0 - 52.0 % 57.2 (H)  57.5 (H) 57.2 (H)   MCV Latest Ref Range: 81.4 - 97.8 fL 89.7  91.6 90.8   MCH Latest Ref Range: 27.0 - 33.0 pg 29.2  29.9 29.5   MCHC Latest Ref Range: 33.7 - 35.3 g/dL 32.5 (L)  32.7 (L) 32.5 (L)   RDW Latest Ref Range: 35.9 - 50.0 fL 59.4 (H)  62.5 (H) 61.4 (H)   Platelet Count Latest Ref Range: 164 - 446 K/uL 105 (L)  109 (L) 102 (L)   MPV Latest Ref Range: 9.0 - 12.9 fL 11.2  11.8 11.0     Results for CINDY JARRETT DAWKINS (MRN [id number]) as of 10/31/2020 11:44   Ref. Range 10/31/2020 04:56   Sodium Latest Ref Range: 135 - 145 mmol/L 123 (L)   Potassium Latest Ref Range: 3.6 - 5.5 mmol/L 4.2    Chloride Latest Ref Range: 96 - 112 mmol/L 94 (L)   Co2 Latest Ref Range: 20 - 33 mmol/L 18 (L)   Anion Gap Latest Ref Range: 7.0 - 16.0  11.0   Glucose Latest Ref Range: 65 - 99 mg/dL 114 (H)   Bun Latest Ref Range: 8 - 22 mg/dL 49 (H)   Creatinine Latest Ref Range: 0.50 - 1.40 mg/dL 1.91 (H)   GFR If  Latest Ref Range: >60 mL/min/1.73 m 2 44 (A)   GFR If Non  Latest Ref Range: >60 mL/min/1.73 m 2 37 (A)   Calcium Latest Ref Range: 8.5 - 10.5 mg/dL 8.2 (L)     Results for JARRETT WHITE (MRN [id number]) as of 10/31/2020 11:44   Ref. Range 10/30/2020 13:13 10/30/2020 17:58 10/30/2020 22:14 10/31/2020 02:58 10/31/2020 04:56 10/31/2020 07:02   Lactic Acid Latest Ref Range: 0.5 - 2.0 mmol/L 2.2 (H) 2.3 (H) 2.1 (H)   2.0     Assessment: 54 y.o. Male with:     #1  Severe left lower extremity cellulitis  #2  Right lower extremity with chronic wounds of unknown age    Plan:    -Continue IV antibiotics/medical management of cellulitis, WBCs downtrending  -wound care for lower extremity wounds  -elevate bilateral lower extremities to decrease swelling  -trend ESR/CRP  -if cellulitis worsens, or patient worsens, would get CT of the bilateral lower extremities to eval for abscesses and for surgical planning: I&D versus amputation      Shauna Garay MD

## 2020-10-31 NOTE — ASSESSMENT & PLAN NOTE
Likely type 2 MI from poor perfusion from sepsis, heart failure  ECHO noted, repeat pending  Monitor

## 2020-10-31 NOTE — ASSESSMENT & PLAN NOTE
Prior hx, current echo appears reduced ef?  Diuresis as tolerated, nearly 28 L negative fluid balance with continuing to improve renal function  On metoprolol

## 2020-10-31 NOTE — ASSESSMENT & PLAN NOTE
Likely in part from sepsis, trending towards normal  Continue to monitor  Transfuse/vitamin K as clinically prudent per protocols  Resume anticoagulation when clinically appropriate and okay with orthopedic surgery

## 2020-10-31 NOTE — ASSESSMENT & PLAN NOTE
Severe bilateral leg cellulitis. Initial concern for necrotizing fascitis  CT legs bilaterally did not show any evidence of fluid collection/abscess or soft tissue gas.   His erythema not appear worsening compared to yesterday/edema worse  Patient initially on clindamycin and daptomycin, transitioned to Ancef and daptomycin ABX per ID  Continue forced diuresis and elevation  Pt initially refusing surgery at this time.  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

## 2020-10-31 NOTE — PROGRESS NOTES
Bedside report received, pt care assumed, tele box on.  Pt is A&Ox4, sitting up in bed, and denies any additional needs at this time. Bed in lowest position, pt educated on fall risk and verbalized understanding, and call light within reach.

## 2020-10-31 NOTE — PROGRESS NOTES
Dr. Seymour notified that patient only has one working IV and he is refusing another PIV. He is also refusing a central line. Orders received from Dr. Seymour to run levophed through peripheral IV, up to a rate of 10mcg/min. Orders verified and implemented.

## 2020-10-31 NOTE — PROGRESS NOTES
1300- Pt transferred to [facility] via hospital bed. Pt transferred to ICU bed. Skin assessed note to follow. Pt BP 86/56 MD Seymour updated. MD stated he would be at bedside shortly. MD Vernon to bedside. Per MD pt needs Bilater LE CT STAT     1320- MD Seymour to bedside. Per MD if pt remains hypotensive OK to run levophed peripherally. MD discussed possible need for central line, pt declined central line and understands risks of needing to run certain medications through a peripheral IV. Will continue to monitor.

## 2020-10-31 NOTE — PROGRESS NOTES
Infectious Disease Progress Note    Author: Feli Hanson M.D. Date & Time of service: 10/31/2020  12:56 PM    Chief Complaint:  Lower extremity cellulitis and necrotic wounds       Interval History:  54-year-old male with known coronary artery disease, atrial fibrillation on chronic anticoagulation, diabetes, who was admitted on 10/29/2020 due to worsening bilateral lower extremity pain, swelling, erythema and necrotic wounds  10/31 AF WBC 22 increased O2 but states less SOB Remains with tender and swollen BLE left greater than right-sloughing eschars    Labs Reviewed, Medications Reviewed and Wound Reviewed.    Review of Systems:  Review of Systems   Constitutional: Negative for fever.   Respiratory: Positive for shortness of breath. Negative for cough and hemoptysis.    Cardiovascular: Positive for leg swelling.   Musculoskeletal: Positive for myalgias.   All other systems reviewed and are negative.      Hemodynamics:  Temp (24hrs), Av.4 °C (97.5 °F), Min:35.9 °C (96.6 °F), Max:36.8 °C (98.3 °F)  Temperature: 36.4 °C (97.5 °F)  Pulse  Av  Min: 65  Max: 92   Blood Pressure: 107/63       Physical Exam:  Physical Exam  Vitals signs and nursing note reviewed.   Constitutional:       General: He is not in acute distress.     Appearance: He is ill-appearing. He is not diaphoretic.      Comments: disheveled   HENT:      Nose: No congestion or rhinorrhea.      Mouth/Throat:      Pharynx: No oropharyngeal exudate.      Comments: edentulous  Eyes:      Extraocular Movements: Extraocular movements intact.      Pupils: Pupils are equal, round, and reactive to light.   Neck:      Musculoskeletal: No muscular tenderness.      Vascular: No carotid bruit.   Cardiovascular:      Rate and Rhythm: Normal rate.      Heart sounds: No murmur.   Pulmonary:      Effort: Pulmonary effort is normal. No respiratory distress.      Breath sounds: No stridor.   Abdominal:      General: There is no distension.      Palpations:  Abdomen is soft.      Tenderness: There is no abdominal tenderness.   Musculoskeletal:         General: Tenderness present.      Right lower leg: Edema present.      Left lower leg: Edema present.   Skin:     Coloration: Skin is not jaundiced.      Findings: Bruising present.      Comments: Large bruise right chest wall  Ext eschars sloughing BLE  LLE remains erythematous to groin-no extension   Neurological:      General: No focal deficit present.      Mental Status: He is alert.   Psychiatric:         Mood and Affect: Mood normal.         Meds:    Current Facility-Administered Medications:   •  tetanus-dipth-acell pertussis  •  furosemide  •  furosemide  •  clindamycin (CLEOCIN) IV  •  DAPTOmycin  •  albuterol  •  amiodarone  •  ipratropium-albuterol  •  glycopyrrolate  •  senna-docusate **AND** polyethylene glycol/lytes **AND** magnesium hydroxide **AND** bisacodyl  •  Respiratory Therapy Consult  •  cloNIDine  •  acetaminophen  •  HYDROcodone/acetaminophen  •  insulin regular **AND** POC Blood Glucose **AND** NOTIFY MD and PharmD **AND** glucose **AND** dextrose 50%  •  morphine injection  •  metoprolol    Labs:  Recent Labs     10/30/20  0653 10/30/20  1016 10/30/20  1313  10/30/20  2214 10/31/20  0258 10/31/20  0456   WBC 22.7* 22.3* 22.4*   < > 22.1* 21.7* 19.8*   RBC 6.74* 6.75* 6.65*   < > 6.38* 6.28* 6.30*   HEMOGLOBIN 19.8* 19.8* 19.3*   < > 18.6* 18.8* 18.6*   HEMATOCRIT 61.6* 61.8* 60.7*   < > 57.2* 57.5* 57.2*   MCV 91.4 91.6 91.3   < > 89.7 91.6 90.8   MCH 29.4 29.3 29.0   < > 29.2 29.9 29.5   RDW 62.1* 61.9* 61.5*   < > 59.4* 62.5* 61.4*   PLATELETCT 111* 114* 102*   < > 105* 109* 102*   MPV 11.7 11.7 11.5   < > 11.2 11.8 11.0   NEUTSPOLYS 91.20* 79.00* 86.40*   < > 94.00* 90.50* 91.60*   LYMPHOCYTES 0.70* 3.00* 0.00*   < > 1.40* 1.40* 1.20*   MONOCYTES 2.90 4.00 5.10   < > 3.10 3.80 3.90   EOSINOPHILS 0.00 0.00 0.00   < > 0.20 0.50 0.40   BASOPHILS 0.00 0.00 1.70   < > 0.20 0.50 0.60   RBCMORPHOLO  Present Present Present  --   --   --   --     < > = values in this interval not displayed.     Recent Labs     10/30/20  0415  10/30/20  2214 10/31/20  0258 10/31/20  0456   SODIUM  --    < > 125* 125* 123*   POTASSIUM  --    < > 4.5 4.5 4.2   CHLORIDE  --    < > 93* 95* 94*   CO2  --    < > 18* 19* 18*   GLUCOSE  --    < > 164* 107* 114*   BUN  --    < > 46* 48* 49*   CPKTOTAL 381*  --   --   --   --     < > = values in this interval not displayed.     Recent Labs     10/29/20  2238 10/30/20  0653  10/30/20  2214 10/31/20  0258 10/31/20  0456   ALBUMIN 3.2 2.6*  --   --   --   --    TBILIRUBIN 1.7* 1.4  --   --   --   --    ALKPHOSPHAT 141* 123*  --   --   --   --    TOTPROTEIN 7.8 6.8  --   --   --   --    ALTSGPT 41 34  --   --   --   --    ASTSGOT 61* 50*  --   --   --   --    CREATININE 1.60* 1.48*   < > 1.82* 1.94* 1.91*    < > = values in this interval not displayed.       Imaging:  Dx-chest-portable (1 View)    Result Date: 10/31/2020    10/31/2020 6:58 AM HISTORY/REASON FOR EXAM: Pleural Effusion TECHNIQUE/EXAM DESCRIPTION:  Single AP view of the chest. COMPARISON: October 29, 2020 FINDINGS: Overlying cardiac leads are present. Cardiomegaly is observed.  Postsurgical changes of sternotomy are noted. The mediastinal contour appears within normal limits.  The central  pulmonary vasculature appears prominent and indistinct. The lungs appear well expanded bilaterally.  Diffuse scattered hazy pulmonary parenchymal opacities are seen. No significant pleural effusions are identified. The bony structures appear age-appropriate.     1.  Pulmonary edema and/or infiltrates are identified, which are stable since the prior exam. 2.  Cardiomegaly    Dx-chest-portable (1 View)    Result Date: 10/30/2020    10/29/2020 11:32 PM HISTORY/REASON FOR EXAM: Shortness of Breath TECHNIQUE/EXAM DESCRIPTION:  Single AP view of the chest. COMPARISON: September 10, 2019 FINDINGS: Overlying cardiac leads are present. Cardiomegaly is  observed.  Postsurgical changes of sternotomy are noted. The mediastinal contour appears within normal limits.  The central  pulmonary vasculature appears prominent and indistinct. The lungs appear well expanded bilaterally.  Hazy interstitial bilateral pulmonary opacities are seen. No significant pleural effusions are identified. The bony structures appear age-appropriate.     1.  Interstitial pulmonary parenchymal prominence, compatible with interstitial edema and/or infiltrates. 2.  Cardiomegaly    Dx-tibia And Fibula Left    Addendum Date: 10/30/2020    Addendum: Subtle clustered punctate lucencies lateral to the ankle are seen which could represent tiny foci of soft tissue gas. These findings were discussed with the patient's clinician, ELIZABETH HILARIO, on 10/30/2020 12:34 AM.    Result Date: 10/30/2020  10/29/2020 11:32 PM HISTORY/REASON FOR EXAM: Atraumatic Pain/Swelling/Deformity TECHNIQUE/EXAM DESCRIPTION:  AP and lateral views of the LEFT tibia and fibula. COMPARISON:  None FINDINGS: Tricompartmental degenerative changes of the knee are seen. Bony structures and articulations appear within normal limits without visualized fracture, subluxation, or dislocation. Atherosclerotic changes are seen. Soft tissue phleboliths are seen. Soft tissue edema is noted.     1.  No acute traumatic bony injury. 2.  Tricompartmental degenerative changes of the knee. 3.  Atherosclerosis    Dx-tibia And Fibula Right    Result Date: 10/30/2020  10/29/2020 11:32 PM HISTORY/REASON FOR EXAM: Atraumatic Pain/Swelling/Deformity TECHNIQUE/EXAM DESCRIPTION:  AP and lateral views of the RIGHT tibia and fibula. COMPARISON:  None FINDINGS: Tricompartmental degenerative changes of the knee are seen, otherwise the bony structures and articulations appear within normal limits without visualized fracture, subluxation, or dislocation. Atherosclerotic changes are seen. Scattered soft tissue level associated.     1.  No acute traumatic bony  injury. 2.  Tricompartmental degenerative changes of the knee 3.  Atherosclerosis    Us-extremity Artery Lower Bilat    Result Date: 10/30/2020  Lower Extremity  Arterial Duplex Report  Vascular Laboratory  CONCLUSIONS  1) Bilateral atherosclerosis  2) Right distal SFA 50-75% stenosis.  2) Monophasic waveforms in the left lower extremity  WHITE, JARRETT  Exam Date:     10/30/2020 01:09  Room #:     Inpatient  Priority:     Stat  Ht (in):             Wt (lb):  Ordering Physician:        ELIZABETH HILARIO  Referring Physician:       ELIZABETH HILARIO  Sonographer:               Silvia Lassiter RVT, RDMS  Study Type:                Complete Bilateral  Technical Quality:         Adequate  Age:    54    Gender:     M  MRN:    [id number]  :    1965      BSA:  Indications:     Ulceration of LE  CPT Codes:       30620  ICD Codes:       707.1  History:         Nonhealing, raw and bleeding wounds bilaterally. Blue                   discoloration of limbs. Severe pain bilaterally.  Limitations:     Pain.                RIGHT  Waveform        Peak Systolic Velocity (cm/s)                  Prox    Prox-Mid  Mid    Mid-Dist  Distal  Triphasic                         107                      CFA  Triphasic       110                                        PFA  Triphasic       96                89      214      79      SFA  Biphasic                          44                       POP  Biphasic                                           49      AT  Not                                                        PT  attained  Not                                                        BRITTON  attained                LEFT  Waveform        Peak Systolic Velocity (cm/s)                  Prox    Prox-Mid  Mid    Mid-Dist  Distal  Monophasic                        137              109     CFA  Biphasic        67                                         PFA  Monophasic      98                125                      SFA                                                              POP                                                             AT                                                             PT                                                             BRITTON  FINDINGS  Right.  There is atherosclerotic plaque seen throughout the limb.  Stenosis of the distal femoral artery. Velocities are consistent with 50-  75% stenosis.  Waveforms at the common femoral, profunda femoral and femoral artery are  triphasic.  Waveforms at the popliteal and distal anterior tibial artery are biphasic.  The remaining vessels were not properly visualized evaluated due to edema,  severe pain in non-healing bleeding wounds and large body habitus.  Left.  There is atherosclerotic plaque seen throughout the limb.  Waveforms at the common femoral, profunda femoral and femoral artery are  monophasic.  The remaining vessels of the left lower limb were not properly  visualized/evaluated due to edema, severe pain in area of non-healing  bleeding wounds and large body habitus.  Nima Dill MD  (Electronically Signed)  Final Date:      2020                   03:22    Us-extremity Venous Lower Bilat    Result Date: 10/30/2020   Vascular Laboratory  CONCLUSIONS  1) Normal bilateral superficial and deep venous examination of the lower  extremities.  2) Lower extremity edema, limits diagnostic sensitivity of this exam  JARRETT WHITE  Exam Date:     10/30/2020 00:46  Room #:     Inpatient  Priority:     Stat  Ht (in):             Wt (lb):  Ordering Physician:        ELIZABETH HILARIO  Referring Physician:       [id number] New  Sonographer:               Silvia Lassiter RVT, RDMS  Study Type:                Complete Bilateral  Technical Quality:         Adequate  Age:    54    Gender:     M  MRN:    [id number]  :    1965      BSA:  Indications:     Localized swelling, mass and lump, lower limb, bilateral,                    Edema, unspecified, Ulceration of LE  CPT Codes:       29788  ICD Codes:       R22.43  R60.9  707.1  History:         Swelling of bilateral lower limbs. Edema. Nonhealing, raw and                   bleeding wounds bilaterally.  Limitations:     Edema, large body habitus and severe pain in limbs at touch  PROCEDURES:  Bilateral lower extremity venous duplex imaging.  The following venous structures were evaluated: common femoral, profunda  femoral, proximal greater saphenous, femoral, popliteal , peroneal and  posterior tibial veins.  Serial compression, augmentation maneuvers,  color and spectral Doppler  flow evaluations were performed.  FINDINGS:  Bilateral lower extremities  No superficial or deep venous thrombosis.  Complete color filling and compressibility with normal venous flow dynamics  including spontaneous flow, response to augmentation maneuvers, and  respiratory phasicity.  The peroneal and posterior tibial veins are difficult to assess for  compressibility and flow by color flow due to severe pain in limbs through  the nonhealing bleeding wounds and edema.  Interstitial fluid consistent with edema is observed in the thigh and below  the knee.  Edema reduces the image quality.  Nima Dill MD  (Electronically Signed)  Final Date:      2020                   03:19    Ec-echocardiogram Complete W/o Cont    Result Date: 10/31/2020  Transthoracic Echo Report Echocardiography Laboratory CONCLUSIONS Poor quality echo, consider repeating with contrast Probably normal LVEF Mild aortic stenosis. RVSP estimated at 30-35 mmHg. JARRETT WHITE Exam Date:         10/31/2020                    09:44 Exam Location:     Inpatient Priority:          Routine Ordering Physician:        MORIAH HUI Referring Physician: Sonographer:               Stalin Mtz RDCS Age:    54     Gender:    M MRN:    [id number] :    1965 BSA:    2.58   Ht (in):    77     Wt (lb):    280 Exam Type:     Complete  Indications:     Heart Failure, Systolic ICD Codes:       428.2 CPT Codes:       95444 BP:   119    /   67     HR:   73 Technical Quality:       Technically difficult study                          incomplete information is                          obtained MEASUREMENTS  (Male / Female) Normal Values 2D ECHO LVOT Diameter                     2 cm                  DOPPLER AV Peak Velocity                  2.6 m/s               AV Peak Gradient                  27.9 mmHg             AV Mean Gradient                  17.7 mmHg             LVOT Peak Velocity                1.4 m/s               AV Area Cont Eq vti               1.6 cm2               MV Velocity Time Integral         41 cm                 Mitral E Point Velocity           1 m/s                 Mitral E to A Ratio               1.1                   Mitral A Duration                 96.9 ms               MV Pressure Half Time             102 ms                MV Area PHT                       2.2 cm2               MV Deceleration Time              352 ms                TR Peak Velocity                  258 cm/s              PV Peak Velocity                  1.3 m/s               PV Peak Gradient                  6.5 mmHg              PV Mean Gradient                  3.9 mmHg              * Indicates values subject to auto-interpretation LV EF:        % FINDINGS Left Ventricle Normal left ventricular chamber size. Normal left ventricular wall thickness. Unable to determine diastolic function. Reliable ejection fraction estimate cannot be made due to technical limitation. Right Ventricle Right ventricle not well visualized. Right Atrium Dilated inferior vena cava with inspiratory collapse. Left Atrium Left atrium not well visualized. Mitral Valve Mitral annular calcification. No stenosis or regurgitation seen. Aortic Valve The aortic valve is not well visualized. Mild aortic stenosis. Vmax is 2.5  m/s. Transvalvular gradients are - Peak: 26 mmHg, Mean: 18  "mmHg. No aortic insufficiency. Tricuspid Valve Structurally normal tricuspid valve. No stenosis or regurgitation seen. RVSP estimated at 30-35 mmHg Pulmonic Valve Structurally normal pulmonic valve. No pulmonic stenosis. Mild pulmonic insufficiency. Pericardium Normal pericardium without effusion. Aorta Normal aortic root for body surface area. Ascending aorta diameter is 3.2 cm. Quinton Parkinson MD (Electronically Signed) Final Date:     31 October 2020                 12:02      Micro:  Results     Procedure Component Value Units Date/Time    CULTURE WOUND W/ GRAM STAIN [[id number]]  (Abnormal) Collected: 10/30/20 0103    Order Status: Completed Specimen: Wound from Abscess Updated: 10/31/20 1144     Significant Indicator POS     Source WND     Site Bilateral Lower Extremity     Culture Result -     Gram Stain Result Moderate Gram positive cocci.  Rare small Gram positive rods.       Culture Result Staphylococcus aureus  Heavy growth  Methicillin sensitive via screening method        Beta Hemolytic Streptococcus group A  Heavy growth      Narrative:      CALL  [place]  171 tel. [phone],  CALLED  171 tel. [phone] 10/31/2020, 11:44, RB PERF. RESULTS CALLED  TO:[id number] RN    BLOOD CULTURE x2 [[id number]]  (Abnormal) Collected: 10/29/20 2338    Order Status: Completed Specimen: Blood from Peripheral Updated: 10/31/20 1139     Significant Indicator POS     Source BLD     Site PERIPHERAL     Culture Result Growth detected by Bactec instrument. 10/30/2020  12:52      Beta Hemolytic Streptococcus group A    Narrative:      CALL  [facility]  171 tel. [phone],  CALLED  171 tel. [phone] 10/31/2020, 11:38, RB PERF. RESULTS CALLED  TO:[id number]  Per Hospital Policy: Only change Specimen Src: to \"Line\" if  specified by physician order.  Right AC    BLOOD CULTURE x2 [[id number]] Collected: 10/29/20 2336    Order Status: Completed Specimen: Blood from Peripheral Updated: 10/31/20 0805     Significant Indicator NEG     Source BLD     " "Site PERIPHERAL     Culture Result No Growth  Note: Blood cultures are incubated for 5 days and  are monitored continuously.Positive blood cultures  are called to the RN and reported as soon as  they are identified.      Narrative:      Per Hospital Policy: Only change Specimen Src: to \"Line\" if  specified by physician order.  Right Forearm/Arm    GRAM STAIN [[id number]] Collected: 10/30/20 0103    Order Status: Completed Specimen: Wound Updated: 10/30/20 2155     Significant Indicator .     Source WND     Site Bilateral Lower Extremity     Gram Stain Result Moderate Gram positive cocci.  Rare small Gram positive rods.      Urinalysis [[id number]]  (Abnormal) Collected: 10/30/20 0634    Order Status: Completed Specimen: Urine, Cath Updated: 10/30/20 0733     Color DK Yellow     Character Turbid     Specific Gravity 1.027     Ph 5.0     Glucose Negative mg/dL      Ketones Negative mg/dL      Protein 100 mg/dL      Bilirubin Moderate     Urobilinogen, Urine 1.0     Nitrite Negative     Leukocyte Esterase Trace     Occult Blood Moderate     Micro Urine Req Microscopic    Narrative:      If not done within the last 24 hours    URINALYSIS [[id number]]     Order Status: Canceled Specimen: Urine, Cath     SARS-CoV-2, PCR (In-House) [[id number]] Collected: 10/30/20 0126    Order Status: Completed Updated: 10/30/20 0242     SARS-CoV-2 Source NP Swab     SARS-CoV-2 by PCR NotDetected     Comment: [facility] providers: PLEASE REFER TO DE-ESCALATION AND RETESTING PROTOCOL  on [email or web address]  **The [employer] GeneXpert Xpress SARS-CoV-2 Test has been made available for  use under the Emergency Use Authorization (EUA) only.         Narrative:      Is patient being admitted?->Yes  Does this patient meet criteria for Rush/Cepheid per [facility]  Inpatient Workflow? (See workflow link below)->Yes  Expected turn around time?->Rush (Cepheid 2-4 hours)  Is this the patients First SARS CoV-2 test?->Unknown  Is this patient employed in " healthcare?->No  Is the patient symptomatic as defined by the CDC?->Unknown  Is the patient hospitalized?->Yes  Is the patient in the ICU?->Unknown  Is the patient a resident in a congregate care setting?->No  Is the patient pregnant?->No    COVID/SARS CoV-2 PCR [[id number]] Collected: 10/30/20 0126    Order Status: Completed Specimen: Respirate from Nasopharyngeal Updated: 10/30/20 0142     COVID Order Status Received     Comment: The order for SARS CoV-2 testing has been received by the  Laboratory. This result is neither positive nor negative.  Final results of testing will report in 24-48 hours, separately.         Narrative:      Is patient being admitted?->Yes  Does this patient meet criteria for Rush/Cepheid per [facility]  Inpatient Workflow? (See workflow link below)->Yes  Expected turn around time?->Rush (Cepheid 2-4 hours)  Is this the patients First SARS CoV-2 test?->Unknown  Is this patient employed in healthcare?->No  Is the patient symptomatic as defined by the CDC?->Unknown  Is the patient hospitalized?->Yes  Is the patient in the ICU?->Unknown  Is the patient a resident in a congregate care setting?->No  Is the patient pregnant?->No    Blood Culture [[id number]] Collected: 10/30/20 0000    Order Status: Canceled Specimen: Other from Peripheral     Blood Culture [[id number]] Collected: 10/30/20 0000    Order Status: Canceled Specimen: Other from Peripheral           Assessment:  Active Hospital Problems    Diagnosis   • *Sepsis ([facility]) [A41.9]   • Cellulitis [L03.90]   • High anion gap metabolic acidosis [E87.2]   • Supratherapeutic INR [R79.1]   • Paroxysmal atrial fibrillation ([facility]) [I48.0]   • LINSEY (acute kidney injury) ([facility]) [N17.9]   • Polycythemia [D75.1]   • Elevated troponin [R77.8]   • Heart failure with preserved ejection fraction, borderline, class IV ([facility]) [I50.30]   • Type 2 diabetes mellitus with complication, without long-term current use of insulin ([facility]) [E11.8]   • COPD (chronic obstructive  pulmonary disease) (HCC) [J44.9]   • Hyponatremia [E87.1]   • Elevated LFTs [R79.89]       Plan:  Lower extremity cellulitis and necrotic wounds Unstable  Sepsis, GAS  Necrotizing cellulitis, possible necrotizing fasciitis  Afebrile  Persistent leukocytosis  Acute kidney injury  Demand ischemia with elevation of his troponins and proBNP  PENICILLIN ALLERGIC  Wound cxs SA and GAS  Blood cxs GAS  DC Zyvox/cefepime/flagyl  Start Dapto/clinda.  Appreciate surgery eval  Tetanus updated     LINSEY, not improved  Avoid nephrotoxic agents  Dose adjust abx as needed    Diabetes  Keep BS under 150 to help control current infection    High risk for limb loss    Discussed with internal medicine/surgery.

## 2020-10-31 NOTE — PROGRESS NOTES
Pt has his albuterol inhaler at bedside and is refusing to allow this nurse to put it in his medication drawer. I witnessed the pt use his inhaler one time this evening at 2000. Dr Klein notified

## 2020-10-31 NOTE — ASSESSMENT & PLAN NOTE
Improved again my creatinine normal and less than 1 and BUN approaching normal  Acute kidney injury 2/2 likely ATN due to sepsis  Lasix reduced to 40 mg twice daily, still diuresing nicely, may be able to reduce Lasix to 20 mg once a day  Monitor UOP closely, strict I/Os

## 2020-10-31 NOTE — ASSESSMENT & PLAN NOTE
Sepsis due to lower ext infection Likely nec fasc clinically but CT negative for usual abnormalities, slightly worse on left lower extremity on exam    Ortho and vascular consulted, patient now agreeable to proceed with amputation that is recommended with left lower extremity AKA, orthopedics taken the patient to the OR 11/4  Cellulitis clinically perhaps worse in the left lower extremity certainly not resolving quickly in both lower extremities  Pressors to support MAP >65 and SBP >90, norepinephrine infusion as needed, off for now  Diuresis as tolerated when clinically appropriate  Antibiotics, ID consult reviewed, deescalated to Ancef/linezolid  Serial cultures wound/blood as needed  Hopefully new cultures from the OR 11/4 and fine-tune antibiotic coverage as clinically prudent  Hemodynamically tolerated surgery no significant SIRS reaction

## 2020-10-31 NOTE — ASSESSMENT & PLAN NOTE
Not in exacerbation  Duonebs prn  Albuterol prn  On seebri bid  Update vaccines prior to discharge

## 2020-10-31 NOTE — CONSULTS
Date of Service  10/31/2020    Reason For Consult  BLE infection    Requesting Physician  LPS    Consulting Physician  Teo Johnson M.D.    Primary Care Physician  Nam Le M.D. (Inactive)    Chief Complaint  LLE cellulitis    History of Present Illness  Mr. Medrano is a 54 y.o. male w/ CAD/CABG 2019, Afib, COPD, DM, and PAD who presented 10/29/2020 with LLE cellulitis, strep sepsis, and severe leg pain. Seen by ortho w/ plan for abx/medical management.    Resuscitated overnight w/ increased O2 requirement.     Vascular asked to assess distal flow.    The pt is a poor historian but states he's had wounds off and on for months. Currently, LLE is tender.    Review of Systems  Review of Systems   Constitutional: Negative.    HENT: Negative.    Eyes: Negative.    Respiratory: Positive for shortness of breath.    Cardiovascular: Positive for leg swelling.   Gastrointestinal: Negative.    Genitourinary: Negative.    Musculoskeletal: Negative.    Skin: Negative.    Neurological: Negative.    Endo/Heme/Allergies: Negative.    Psychiatric/Behavioral: Negative.        Past Medical History  Past Medical History:   Diagnosis Date   • ASTHMA    • Atrial fibrillation (HCC)    • CAD (coronary artery disease)    • Chronic obstructive pulmonary disease (HCC)    • Congestive heart failure (HCC)    • Diabetes        Surgical History  Past Surgical History:   Procedure Laterality Date   • COLONOSCOPY - ENDO N/A 7/25/2018    Procedure: COLONOSCOPY - ENDO;  Surgeon: Josiah Molina D.O.;  Location: ENDOSCOPY [facility];  Service: Gastroenterology   • THORACOSCOPY Left 1/25/2018    Procedure: THORACOSCOPY- PLEURODESIS ;  Surgeon: Chandu Paez M.D.;  Location: SURGERY [facility];  Service: General   • MULTIPLE CORONARY ARTERY BYPASS ENDO VEIN HARVEST  12/22/2017    Procedure: MULTIPLE CORONARY ARTERY BYPASS ENDO VEIN HARVEST X2;  Surgeon: Kiel Ash M.D.;  Location: SURGERY [facility];  Service:  Cardiothoracic   • JIM  12/22/2017    Procedure: JIM;  Surgeon: Kiel Ash M.D.;  Location: SURGERY [facility];  Service: Cardiothoracic   • OTHER ABDOMINAL SURGERY          Family History  Family History   Problem Relation Age of Onset   • Diabetes Mother    • Stroke Mother    • Leukemia Mother    • Diabetes Father    • No Known Problems Sister    • Heart Disease Brother         PPM   • Lung Disease Brother    • Alcohol/Drug Brother    • Diabetes Sister         Social History  Social History     Socioeconomic History   • Marital status:      Spouse name: Not on file   • Number of children: Not on file   • Years of education: Not on file   • Highest education level: Not on file   Occupational History   • Not on file   Social Needs   • Financial resource strain: Not on file   • Food insecurity     Worry: Not on file     Inability: Not on file   • Transportation needs     Medical: Not on file     Non-medical: Not on file   Tobacco Use   • Smoking status: Former Smoker     Packs/day: 0.00     Years: 30.00     Pack years: 0.00     Types: Cigars, Cigarettes     Quit date: 9/11/2017     Years since quitting: 3.1   • Smokeless tobacco: Never Used   Substance and Sexual Activity   • Alcohol use: No   • Drug use: No   • Sexual activity: Not on file   Lifestyle   • Physical activity     Days per week: Not on file     Minutes per session: Not on file   • Stress: Not on file   Relationships   • Social connections     Talks on phone: Not on file     Gets together: Not on file     Attends Rastafarian service: Not on file     Active member of club or organization: Not on file     Attends meetings of clubs or organizations: Not on file     Relationship status: Not on file   • Intimate partner violence     Fear of current or ex partner: Not on file     Emotionally abused: Not on file     Physically abused: Not on file     Forced sexual activity: Not on file   Other Topics Concern   • Not on file   Social History  Narrative   • Not on file       Medications  Prior to Admission Medications   Prescriptions Last Dose Informant Patient Reported? Taking?   acetaminophen (TYLENOL) 325 MG Tab prn at prn  No No   Sig: Take 2 Tabs by mouth every 6 hours as needed (Mild Pain; (Pain scale 1-3); Temp greater than 100.5 F).   albuterol 108 (90 Base) MCG/ACT Aero Soln inhalation aerosol prn at prn Patient Yes No   Sig: Inhale 2 Puffs by mouth every 6 hours as needed for Shortness of Breath.   amiodarone (CORDARONE) 200 MG Tab 10/29/2020 at am  No No   Sig: Take 1 Tab by mouth every morning.   ferrous sulfate 325 (65 Fe) MG tablet 10/29/2020 at am Patient Yes No   Sig: Take 325 mg by mouth every morning.   furosemide (LASIX) 40 MG Tab 10/29/2020 at am  No No   Sig: Take 1 Tab by mouth every morning.   ipratropium-albuterol (DUONEB) 0.5-2.5 (3) MG/3ML nebulizer solution prn at prn Patient No No   Sig: 3 mL by Nebulization route every 6 hours as needed for Shortness of Breath.   lisinopril (PRINIVIL) 2.5 MG Tab 10/29/2020 at am  No No   Sig: Take 1 Tab by mouth every morning.   metoprolol SR (TOPROL XL) 25 MG TABLET SR 24 HR 10/29/2020 at am  No No   Sig: Take 1 Tab by mouth every morning.   polyethylene glycol/lytes (MIRALAX) Pack 10/29/2020 at am Patient Yes No   Sig: Take 17 g by mouth every morning.   potassium chloride SA (K-DUR) 10 MEQ Tab CR 10/29/2020 at am  No No   Sig: Take 1 Tab by mouth every morning.   rosuvastatin (CRESTOR) 40 MG tablet 10/29/2020 at am  No No   Sig: Take 1 Tab by mouth every morning.   spironolactone (ALDACTONE) 25 MG Tab 10/29/2020 at am  No No   Sig: Take 1 Tab by mouth every morning.   tiotropium (SPIRIVA) 18 MCG Cap 10/29/2020 at am Patient Yes No   Sig: Inhale 18 mcg by mouth every morning.   warfarin (COUMADIN) 5 MG Tab 10/29/2020 at unk  No No   Sig: TAKE 1 & 1/2 (ONE & ONE-HALF) TABLETS BY MOUTH ONCE DAILY OR  AS  DIRECTED  BY  COUMADIN  CLINIC      Facility-Administered Medications: None       Current  Facility-Administered Medications   Medication Dose Route Frequency Provider Last Rate Last Dose   • tetanus-dipth-acell pertussis (Tdap) inj 0.5 mL  0.5 mL Intramuscular Once Feli Hanson M.D.       • furosemide (LASIX) injection 40 mg  40 mg Intravenous Q DAY Chandu Monaco M.D.       • furosemide (LASIX) injection 40 mg  40 mg Intravenous Once Meng Seymour M.D.       • furosemide (LASIX) injection 40 mg  40 mg Intravenous Once Meng Seymour M.D.       • albuterol inhaler 2 Puff  2 Puff Inhalation Q6HRS PRN Jayro Vásquez M.D.       • amiodarone (Cordarone) tablet 200 mg  200 mg Oral QAM Jayro Vásquez M.D.   200 mg at 10/31/20 0600   • ipratropium-albuterol (DUONEB) nebulizer solution  3 mL Nebulization Q6HRS PRN (RT) Jayro Vásquez M.D.       • glycopyrrolate (Seebri) 15.6 mcg inhalation capsule 1 Cap  1 Cap Inhalation BID (RT) Jayro Vásquez M.D.   Stopped at 10/30/20 0800   • senna-docusate (PERICOLACE or SENOKOT S) 8.6-50 MG per tablet 2 Tab  2 Tab Oral BID Artem Givens M.D.        And   • polyethylene glycol/lytes (MIRALAX) PACKET 1 Packet  1 Packet Oral QDAY PRN Artem Givens M.D.        And   • magnesium hydroxide (MILK OF MAGNESIA) suspension 30 mL  30 mL Oral QDAY PRN Artem Givens M.D.        And   • bisacodyl (DULCOLAX) suppository 10 mg  10 mg Rectal QDAY PRN Artem Givens M.D.       • Respiratory Therapy Consult   Nebulization Continuous RT Artem Givens M.D.       • cloNIDine (CATAPRES) tablet 0.1 mg  0.1 mg Oral Q6HRS PRN Artem Givens M.D.       • acetaminophen (TYLENOL) tablet 500 mg  500 mg Oral Q6HRS PRN Artem Givens M.D.       • HYDROcodone/acetaminophen (NORCO)  MG per tablet 1 Tab  1 Tab Oral Q6HRS PRN Artem Givens, M.D.   1 Tab at 10/30/20 2032   • insulin regular (HumuLIN R,NovoLIN R) injection  1-6 Units Subcutaneous Q6HRS Artem Givens M.D.   Stopped at 10/30/20 0600    And   • glucose 4 g chewable tablet 16 g  16 g Oral Q15 MIN MARLENE Givens M.D.        And   •  dextrose 50% (D50W) injection 50 mL  50 mL Intravenous Q15 MIN PRN Artem Givens M.D.       • morphine (pf) 4 mg/mL injection 4 mg  4 mg Intravenous Q4HRS PRN Kady Barraza M.D.   4 mg at 10/30/20 1154   • cefepime (MAXIPIME) 2 g in  mL IVPB  2 g Intravenous Q8HRS Kady Barraza M.D.   Stopped at 10/31/20 0606   • metroNIDAZOLE (FLAGYL) IVPB 500 mg  500 mg Intravenous Q8HRS Kady Barraza M.D.   Stopped at 10/31/20 0636   • Linezolid (ZYVOX) premix 600 mg  600 mg Intravenous Q12HRS Sarah Dennis M.D.   Stopped at 10/31/20 0634   • metoprolol (LOPRESSOR) tablet 12.5 mg  12.5 mg Oral TWICE DAILY Kady Barraza M.D.   12.5 mg at 10/31/20 0529       Allergies  Allergies   Allergen Reactions   • Cillins [Penicillins] Anaphylaxis and Rash     As a child; tolerated cefepime (12/2017), ceftriaxone (1/2018), cefazolin (12/2017)   • Erythromycin Anaphylaxis     Rxn - 1994   • Shellfish Allergy Anaphylaxis     Pt stated that he is allergic to all seafood, will develop a rash and says that rash will clear up with benadryl   • Metoprolol Unspecified     Pt stated got latham and aggressive, in demi dose's per wife.            Physical Exam  Temp:  [35.9 °C (96.6 °F)-36.8 °C (98.3 °F)] 36.4 °C (97.5 °F)  Pulse:  [65-81] 72  Resp:  [20-28] 20  BP: ()/(48-78) 107/63  SpO2:  [90 %-95 %] 94 %    Pulse/Extremity Exam:    Popliteals:       Right monphasic       Left monphasic  Pedal Pulses:       Right DP monophasic       Right PT monophasic       Left DP monophasic       Left PT absent    Wounds: BLE diffusely edematous and violaceous. Partial thickness eschar over LLE, soughing. Cellulitis to upper thigh on the right. Partial-thickness wounds over dorsum of R ankle, weeping. No crepitus or ballotable collection.    General appearance: obese, on O2, conversing appropriately  Psych: Normal affect, mood, judgement  Neuro: CN II-XII grossly intact.   Neck: full range of motion  Lungs: No inspiratory stridor or  wheezing  CV: RRR  Abdomen: Soft, NT/ND  Skin: No rashes    Labs Reviewed Today:  Recent Labs     10/30/20  2214 10/31/20  0258 10/31/20  0456   WBC 22.1* 21.7* 19.8*   RBC 6.38* 6.28* 6.30*   HEMOGLOBIN 18.6* 18.8* 18.6*   HEMATOCRIT 57.2* 57.5* 57.2*   MCV 89.7 91.6 90.8   MCH 29.2 29.9 29.5   MCHC 32.5* 32.7* 32.5*   RDW 59.4* 62.5* 61.4*   PLATELETCT 105* 109* 102*   MPV 11.2 11.8 11.0     Recent Labs     10/30/20  2214 10/31/20  0258 10/31/20  0456   SODIUM 125* 125* 123*   POTASSIUM 4.5 4.5 4.2   CHLORIDE 93* 95* 94*   CO2 18* 19* 18*   GLUCOSE 164* 107* 114*   BUN 46* 48* 49*   CREATININE 1.82* 1.94* 1.91*   CALCIUM 8.2* 8.1* 8.2*     Recent Labs     10/29/20  2238 10/30/20  0653  10/30/20  2214 10/31/20  0258 10/31/20  0456   ALTSGPT 41 34  --   --   --   --    ASTSGOT 61* 50*  --   --   --   --    ALKPHOSPHAT 141* 123*  --   --   --   --    TBILIRUBIN 1.7* 1.4  --   --   --   --    GAMMAGT  --  61*  --   --   --   --    GLUCOSE 142* 135*   < > 164* 107* 114*    < > = values in this interval not displayed.     Recent Labs     10/30/20  0100 10/30/20  1313 10/31/20  0258   APTT 68.0*  --   --    INR 4.46* 3.94* 3.86*             No results for input(s): TROPONINI in the last 72 hours.    Urinalysis:    Recent Labs     10/30/20  0634   SPECGRAVITY 1.027   GLUCOSEUR Negative   KETONES Negative   NITRITE Negative   LEUKESTERAS Trace*   WBCURINE 5-10*   RBCURINE 0-2*   BACTERIA Negative   EPITHELCELL Few        Imaging Reviewed Today:  I personally reviewed all non-invasive vascular testing including images, x-rays, tracings, arterial waveforms, and duplex exams relevant to this admission. My interpretation is below:    Arterial Duplex: Limited eval of tibials d/t edema. Diffuse bilateral disease w/ multiphasic waveforms throughout on the right. Monophasic throughout the LLE.    Venous Duplex: No DVT/SVT in either leg      Assessment/Plan & Medical Decision-Making  54M admitted w/ strep sepsis and chronic BLE  wounds, cellulitis in the right leg.  Has signal in the bilateral pops and in the feet. WBC trended down since admission. Fluid status tenuous given resuscitation needs balanced against his cardiac issues. pt is unsure how much he would want done if debridement became necessary and stated he would need to discuss w/ his family. I discussed the case w/ ortho today.    Teo Johnson MD  Vascular Surgeon  [facility]  Office: [phone]

## 2020-10-31 NOTE — ASSESSMENT & PLAN NOTE
Serial CBC  O2 supplementation, PAP therapy as needed  Eval for SHASHANK when clinically appropriate  Phlebotomy unlikely necessary now, blood loss post amputation dropped hemoglobin 3 g to 15.9, bleeding controlled and hemoglobin unchanged  Continue to monitor

## 2020-10-31 NOTE — PROGRESS NOTES
Patient is a 54-year-old man who was admitted on 10/29/2020 with severe sepsis secondary to bilateral lower extremity cellulitis, possible necrotizing fasciitis with acute kidney injury.  Has a history of CABG, A. fib on Coumadin, HEpEF in 2018, polycythemia most likely in the setting of untreated SHASHANK, COPD.  Patient received IV fluids in the setting of sepsis, however his blood pressures are on the lower side with SBP's 90s to 110.  His BNP, chest x-ray, so serum sodium 123, urine sodium less than 20 RRR concerning for worsening heart failure.  Repeat echo showed reduced ejection fraction but unclear about the amount.  He is requiring supplemental oxygen to 2 L.  He was started on linezolid, cefepime and Flagyl.    Infectious disease, vascular surgery, orthopedic surgery, cardiology are on board.    He needs higher level of care for monitoring hemodynamic function.  Orthopedic surgery recommended radical debridement versus bilateral above-knee amputation.  Patient with discuss his goals of care with his family before any surgical intervention.  Case discussed with ICU resident and Dr. Seymour.  Patient will be transferred to ICU.

## 2020-10-31 NOTE — ASSESSMENT & PLAN NOTE
BLE edema progressive  Crackles heard at lung bases bilaterally  Oxygen demand increased overnight  BNP 12,000 (10,000 on admission)  Cardiology consulted, suggest probable right sided failure  History of HFrEF (EF 25%) -.  HFpEF (EF 55%) s/p CABG times 3 December 2017    -Gentle diuresis per cardiology's recommendation; appreciated  -Transfer to the ICU in the setting of progressive hemodynamic instability

## 2020-10-31 NOTE — PROGRESS NOTES
LIMB PRESERVATION SERVICE     55 y/o male with extensive chronic BLE ulcerations, cellulitis.     +strep bacteremia, poor arterial studies primarily to LLE    Consult ID, vascular, wound care team.   Dressing care orders placed  Full consult to follow

## 2020-10-31 NOTE — ASSESSMENT & PLAN NOTE
Persisting, mildly improved  Reassess volume status daily  Lasix/fluid restriction as tolerated  Low EF  Continue serial BMP

## 2020-10-31 NOTE — ASSESSMENT & PLAN NOTE
Cardiac monitoring  On amiodarone and metoprolol, hold metoprolol for low blood pressure-treat with dig if needed  Continue to optimize electrolytes  Anticoagulate as clinically appropriate, holding since he is going to the OR, resume postop when okay with surgery

## 2020-10-31 NOTE — CONSULTS
DATE OF SERVICE:  10/30/2020    INFECTIOUS DISEASE CONSULTATION      REASON FOR CONSULT:  Lower extremity cellulitis and necrotic wounds.    CONSULTING PHYSICIAN:  Dr. Kady Barraza.    HISTORY OF PRESENT ILLNESS:  This is a 54-year-old male with known coronary   artery disease, atrial fibrillation on chronic anticoagulation, diabetes, who   was admitted on 10/29/2020 due to worsening bilateral lower extremity pain,   swelling, erythema and wounds.   By report as patient is a poor historian, he   has had lower extremity swelling for over a month and had been progressively   worsening and over the past week, the wife has noticed increasing areas of   skin breakdown, purulent discharge, and worsening wounds.  The patient   reported that his pain was 9/10.  It was worse on the left than the right, but   he did not have any fever, chills, nausea, vomiting or diarrhea.  In the ED,   he was afebrile.  He is intermittently tachypneic and tachycardic.  Blood   pressure is labile.  He did have an elevated white blood cell count and acute   kidney injury.  He has been seen and evaluated by orthopedics.  He did tell   the orthopedic surgeon that he had a brown recluse spider bite about 15 years   ago and thinks that the wounds might be related to that.  Surgery is   following.  For now, they are recommending medical management.  He is   currently receiving cefepime, Zyvox, and Flagyl due to reported PENICILLIN   ALLERGY.  Infectious disease consulted for antibiotic recommendations and   management.    REVIEW OF SYSTEMS:  Positive for lower extremity pain, limited due to patient   being a poor historian, but otherwise all other systems are reviewed and are   negative.      ALLERGIES:  HE STATES ANAPHYLAXIS AND RASH TO PENICILLIN.  He has tolerated   Ancef; however, in the past.  ANAPHYLAXIS TO  ERYTHROMYCIN, SHELLFISH AND AN   UNSPECIFIED REACTION TO METOPROLOL.    PAST MEDICAL HISTORY:  Hypertension, hyperlipidemia, atrial  fibrillation,   coronary artery disease, COPD, congestive heart failure, diabetes.    PAST SURGICAL HISTORY:  Includes CABG.    FAMILY HISTORY:  Diabetes, heart disease, cancer, stroke, Parkinson's.    SOCIAL HISTORY:  He does smoke, but he has cut back from 3 packs a day to 2   cigarettes a day.  He is a former alcoholic, but quit years ago, recently   travel to [place].  No known sick contacts.    PHYSICAL EXAMINATION:  GENERAL:  He is disheveled-appearing male, looks ill.  VITAL SIGNS:  He has been afebrile, current temperature is 96.8; blood   pressure was 96/61, now 111/67; pulse of 81; respiratory rate 20; oxygen   saturation 90-94% on room air.  He weighs 127 kilos.  He is 6 feet 5 inches.  HEENT:  Normocephalic, atraumatic.  Pupils equal, round, reactive to light.    Extraocular movements intact.  He has anicteric sclerae.  Oropharynx is clear. Edentuloud  NECK:  Supple.  There is no JVD No stridor.  CHEST:  Grossly clear to auscultation bilaterally, unlabored, has decreased   breath sounds bilaterally.  No wheezing or rhonchi.  ABDOMEN:  Soft, nontender, nondistended. +BS  EXTREMITIES:  Show bilateral lower extremity edema left greater than right with multiple ulcerations   with joel necrosis and purulent drainage.  On his right lower extremity, he   has multiple frankly necrotic ulcerations, the worst on his anterior tibia and   just superior to his heel.  On his left lower extremity, he again has   multiple ulcerative lesions of varying size, irregular borders through to the   fat layer, chronic appearing edema. Erythema extends to thigh. His left inner thigh has a scar that is   well healed.  He has circumferential shaggy ulcerated purulent appearing wound   on his left calf.  SKIN:  Also reveals ecchymoses and healing eschar on his right second digit as   well as a possible denuded tissues on his third digit.  It looks like he had   this was from a ring  NEUROLOGIC:  He is awake, alert, does follow  commands.  He is a poor   Historian. CRYSTAL  PSYCHIATRIC:  Mood is normal.  Affect is flat    LABORATORY DATA:  White blood cell count of 22.7, H and H of 19.8 and 61.8,   platelets are 114.  Urinalysis showed 5-10 wbc's, 0-2 rbc's.  Sodium 130,   potassium 4.2, chloride 97, bicarbonate 22, glucose 135, BUN 40, creatinine   1.48, which is improved from 1.6.  AST 50, ALT 32, alkaline phosphatase 123.    Gamma GT was 61.  CPK was 381.  ProBNP was 10,855.  Troponin was 47.  Prior   blood cultures in 2019 were  contaminated.   Blood cultures this admission are   showing possible strep.  Chest x-ray not done.  EKG, QTc 467.    ASSESSMENT AND PLAN:  A 54-year-old male admitted yesterday with worsening   bilateral lower extremity pain, swelling, erythema with multiple necrotic   appearing ulcers as well as a severe what appears to be necrotizing   cellulitis, possible necrotizing fasciitis.  He has not had any fever;   however, he has had tachycardia, significant leukocytosis, hemoconcentration (query polycythemia),   although this appears chronic, acute kidney injury, evidence of demand   ischemia with elevation of his troponins as well as significant increase in   his end-diastolic pressure suggestive of sepsis and/or cardiomyopathy with a   markedly elevated proBNP.  Because of his acute renal failure, the vancomycin   was switched to Zyvox.  He is receiving Flagyl and cefepime for broad   antimicrobial coverage, which we do agree with.  Unfortunately, he is   PENICILLIN ALLERGIC.  The positive blood culture for strep is most likely   going to be a group A strep if it has not a contaminant given his clinical   presentation.  He has been seen and evaluated by surgery to anticipate he will   require debridement.  He will need aggressive wound care as he is a diabetic.    He is certainly at high risk for limb loss.  Continue to control blood   sugars to promote healing.  He does have atrial fibrillation.  He is on    metoprolol, amiodarone and anticoagulation.  This is being managed by his   primary care team.  His INR was supratherapeutic on admission.  He does have   acute kidney injury.  Antibiotics will need to be dose adjusted for his renal   Failure. Needs tetanus updated if not current.  Final recommendations will be per culture results and clinical   course.    Thank you and we will follow with you.       ____________________________________     MD STEPHANIE JAMA / YOSVANY    DD:  10/30/2020 13:57:42  DT:  10/30/2020 17:53:52    D#:  [id number]  Job#:  [id number]

## 2020-10-31 NOTE — PROGRESS NOTES
Daily Progress Note:     Date of Service: 10/31/2020  Primary Team: [facility] Blue Team   Attending: Patricia Mccurdy M.D.   Senior Resident: Dr. CHOCO Barraza M.D  Intern: Dr. CHERYL Dennis M.D  Contact:  [phone]    Chief Complaint:   Weeping leg wound    ID: Mr. Medrano is a 54-year-old man with history of HFpEF (55% 11/2018), pAFIB, CAD s/p CABG X3 (December 2017), COPD (FEV1 58%, FVC 40%, FEV1/FVC 73% 3/27/2018), NIDDM 2 (A1c 7.8 10/30/2020), obesity, tobacco use disorder admitted for severe sepsis secondary to extensive LLE cellulitis.  Imaging of LLE is remarkable for small punctate gas bubbles.  He was assessed on presentation by orthopedic surgery who recommended aggressive medical management in an attempt to save the limb.  If worsening/does not improve, likely to require extensive debridement and likely amputation above the knee bilaterally.    Subjective: denies chest pain, palpitation, chills, fevers.  Reports feeling better      Consultants/Specialty:  Orthopedic surgery  Vascular surgery  Intensive care    Review of Systems:    ROS  Please see H&P from 10/30 for complete ROS.  Brief ROS mentioned in subjective    Objective Data:   Physical Exam:   Vitals:   Temp:  [35.9 °C (96.6 °F)-36.8 °C (98.3 °F)] 36.4 °C (97.5 °F)  Pulse:  [65-81] 76  Resp:  [20-28] 20  BP: ()/(48-78) 86/56  SpO2:  [90 %-95 %] 94 %   Physical Exam  Vitals signs and nursing note reviewed.   Constitutional:       Appearance: He is obese. He is toxic-appearing.   HENT:      Head: Normocephalic and atraumatic.      Right Ear: External ear normal.      Left Ear: External ear normal.      Mouth/Throat:      Mouth: Mucous membranes are moist.      Pharynx: No oropharyngeal exudate or posterior oropharyngeal erythema.   Eyes:      General: No scleral icterus.     Extraocular Movements: Extraocular movements intact.      Pupils: Pupils are equal, round, and reactive to light.   Neck:      Musculoskeletal: Normal range of motion and neck  supple. No muscular tenderness.   Cardiovascular:      Rate and Rhythm: Tachycardia present.      Pulses: Normal pulses.      Heart sounds: Normal heart sounds. No murmur. No friction rub. No gallop.    Pulmonary:      Effort: Pulmonary effort is normal.      Breath sounds: Wheezing and rhonchi present.   Abdominal:      General: There is no distension.      Palpations: There is no mass.      Tenderness: There is no abdominal tenderness. There is no guarding or rebound.      Hernia: A hernia is present.      Comments: Protuberant belly, soft , NTTP.  Notable for large ventral hernia, reducible   Genitourinary:     Penis: Normal.       Scrotum/Testes: Normal.   Musculoskeletal:      Comments: RLE is edematous, erythematous with multiple weeping sores up to above the knee.  Chronic in appearance, with crusted granulating tissue in extensive portions of the shin and dorsal aspect of the right foot.  LLE is edematous, erythematous, warm to the touch in comparison to RLE.  It is more acute in appearance with weeping sore on the dorsal aspect of the foot.  Edema and erythema are well demarcated with felt pen.  Appear to have a somewhat diminished area of erythema when compared to examination this a.m.  BLE are exquisitely tender to deep palpation.  There is a loss of sensation bilaterally to soft touch.  Area of edema and erythema does not seem to extend to the groin.  Edema has progressed, is stable   Skin:     Capillary Refill: Capillary refill takes more than 3 seconds. Capillary refill over BLE greater than 7 seconds  Neurological:      Mental Status: He is alert and oriented to person, place, and time.           Labs:   Recent Labs     10/30/20  0653 10/30/20  1016 10/30/20  1313  10/30/20  2214 10/31/20  0258 10/31/20  0456   WBC 22.7* 22.3* 22.4*   < > 22.1* 21.7* 19.8*   RBC 6.74* 6.75* 6.65*   < > 6.38* 6.28* 6.30*   HEMOGLOBIN 19.8* 19.8* 19.3*   < > 18.6* 18.8* 18.6*   HEMATOCRIT 61.6* 61.8* 60.7*   < > 57.2*  57.5* 57.2*   MCV 91.4 91.6 91.3   < > 89.7 91.6 90.8   MCH 29.4 29.3 29.0   < > 29.2 29.9 29.5   RDW 62.1* 61.9* 61.5*   < > 59.4* 62.5* 61.4*   PLATELETCT 111* 114* 102*   < > 105* 109* 102*   MPV 11.7 11.7 11.5   < > 11.2 11.8 11.0   NEUTSPOLYS 91.20* 79.00* 86.40*   < > 94.00* 90.50* 91.60*   LYMPHOCYTES 0.70* 3.00* 0.00*   < > 1.40* 1.40* 1.20*   MONOCYTES 2.90 4.00 5.10   < > 3.10 3.80 3.90   EOSINOPHILS 0.00 0.00 0.00   < > 0.20 0.50 0.40   BASOPHILS 0.00 0.00 1.70   < > 0.20 0.50 0.60   RBCMORPHOLO Present Present Present  --   --   --   --     < > = values in this interval not displayed.     Recent Labs     10/30/20  0415  10/30/20  2214 10/31/20  0258 10/31/20  0456   SODIUM  --    < > 125* 125* 123*   POTASSIUM  --    < > 4.5 4.5 4.2   CHLORIDE  --    < > 93* 95* 94*   CO2  --    < > 18* 19* 18*   GLUCOSE  --    < > 164* 107* 114*   BUN  --    < > 46* 48* 49*   CPKTOTAL 381*  --   --   --   --     < > = values in this interval not displayed.     Lactic acid:   1.7 (10/30 10:16) -> 2.2 (10/30 13:13) -> 2.0 (10/31 7;00)    Serum osmolarity: 287  Urine osmolarity 628  Urine sodium: Less than 20  Urine creatinine: 135    Imaging:   CXR 10/30/2020:  Interstitial pulmonary parenchymal prominence, and cyst Randi edema versus infiltrates  Cardiomegaly  CX-tibia and fibula left 10/30/2020  Subtle cluster punctate lucencies lateral to the ankle, may represent small foci of soft tissue gas  CX-tibia and fibula right 10/30/2020:  No acute processes   US-Doppler BLE 10/30/2020:  No superficial or deep venous thrombosis  Or structural fluid consistent with edema in the thigh and below-knee  Surface echo 10/31/2020:  Indeterminate on official read.    ID:  Mr. Medrano is a 54-year-old man with history of HFpEF (55% 11/2018), pAFIB, CAD s/p CABG X3 (December 2017), COPD (FEV1 58%, FVC 40%, FEV1/FVC 73% 3/27/2018), NIDDM 2 (A1c 7.8 10/30/2020), obesity, tobacco use disorder admitted for severe sepsis secondary to  extensive LLE cellulitis.  Imaging of LLE is remarkable for small punctate gas bubbles.  He was assessed on presentation by orthopedic surgery who recommended aggressive medical management in an attempt to save the limb.  If worsening/does not improve, likely to require extensive debridement and possible amputation above the knee bilaterally.  Downtrending sodium levels, expansion of edema and crackles at lung bases bilaterally are concerning for exacerbation of HFpEF.  BNP greater than 12,000 (10,000 on admission) and supportive.  Per cardiology consult, probable right heart failure.  The constellation of comorbidities and challenges of maintaining blood pressures in the setting of severe sepsis and right sided heart failure may quickly require invasive interventions to prevent precipitous deterioration.  Intensivist consulted and has accepted the patient to the ICU service.  Patient was counseled regarding the severity of his state and was initially resistant to transfer to the ICU.  He was reassured that transfer to the ICU does not mean that he agrees to bilateral BKA.  He accepted this explanation and consented to be transferred.    * Sepsis (HCC)  Assessment & Plan  This is severe sepsis in the setting of endorgan damage (LINSEY.)    This is Sepsis Present on admission  SIRS criteria identified on my evaluation include: Tachycardia, with heart rate greater than 90 BPM, Tachypnea, with respirations greater than 20 per minute and Leukocytosis, with WBC greater than 12,000  Source is cellulitis of BLE.  Sepsis protocol initiated  Fluid resuscitation ordered per protocol  IV antibiotics as appropriate for source of sepsis  While organ dysfunction may be noted elsewhere in this problem list or in the chart, degree of organ dysfunction does not meet CMS criteria for severe sepsis      -BCx remarkable for GPC in clusters, C/W Staph species  -vanc and ceftriaxone discontinued  - linezolid 600 mg twice daily  IV  -Metronidazole 500 mg 3 times daily IV  -Cefepime 2 g every 8 hours IV  -500 mL IVF boluses as needed (choice of LR versus NS pending BMP); reassess fluid responsiveness prior to each bolus.  -Reassess patient at bedside every 3-4 hours  -Limb preservation service consulted  -Orthopedic surgery following  -ID following  -Vascular surgery following  -ID pharmacy recommended above ABX, appreciate recs          Right-sided heart failure (HCC)  Assessment & Plan  BLE edema progressive  Crackles heard at lung bases bilaterally  Oxygen demand increased overnight  BNP 12,000 (10,000 on admission)  Cardiology consulted, suggest probable right sided failure  History of HFrEF (EF 25%) -.  HFpEF (EF 55%) s/p CABG times 3 December 2017    -Gentle diuresis per cardiology's recommendation; appreciated  -Transfer to the ICU in the setting of progressive hemodynamic instability    Cellulitis  Assessment & Plan  Of BLE.  Chronic appearing RLE, progressively worsening.   Acute appearing elderly, concern for necrotizing fascitis in LLE based on XR L tibia/fibula. Patient was unable to fully tolerate arterial doppler.   Not improving after 24 hours of IV ABX as per above  -f/u wound cx  -pain control:   Hydrocodone/acetaminophen 10/325 mg p.o. every 6 as needed  Morphine 4 mg IV every 4 hours as needed  Tylenol 500 mg every 6 hours as needed  -wound care consulted  -limb preseveration team consulted  -Orthopedic surgery and vascular surgery following      High anion gap metabolic acidosis  Assessment & Plan  Resolved  Secondary to lactic acidosis and severe sepsis.        Supratherapeutic INR- (present on admission)  Assessment & Plan  At admission INR 4.46 -.3.86    -holding warfarin  -recheck INR daily    Paroxysmal atrial fibrillation (HCC)- (present on admission)  Assessment & Plan  Controlled on metoprolol, amiodarone. [id number]: 4 points. HASBLED: 2 points.  On warfarin with supratherapeutic INR (4.46  10/30/2020)  Plan:  -Continue metoprolol, amiodarone  -holding warfarin given supratherapeutic INR in anticipation of surgery  -On discharge, will require anticoagulation clinic referral    LINSEY (acute kidney injury) ([facility])  Assessment & Plan   pre-renal from dehydration.    -IVF     Elevated LFTs  Assessment & Plan  Elevated AST with elevated GGT remarkable for biliary inflammation.  Infection and musculoskeletal trauma (CPK elevated) likely contributory.   Elevated alk phos chronic.  Elevated t bili likely due to sepsis.       Polycythemia  Assessment & Plan  Chronic. Likely 2/2 chronic hypoxia due to COPD vs SHASHANK.     -To be addressed in an outpatient setting    Elevated troponin- (present on admission)  Assessment & Plan  Chronically elevated, asymptomatic and EKG without ST changes at this time.    -EKG prn chest pain    Heart failure with preserved ejection fraction, borderline, class IV ([facility])- (present on admission)  Assessment & Plan  Stable.   Surface echocardiogram (11/2018): EF 55%.    -Start metoprolol tartrate  -Hold furosemide  -Small boluses and reassessment frequently as per above      Type 2 diabetes mellitus with complication, without long-term current use of insulin ([facility])- (present on admission)  Assessment & Plan  Stable. Last A1c 6.6 (8/2019)    -A1c  -hypoglycemia protocol  -SSI     COPD (chronic obstructive pulmonary disease) ([facility])  Assessment & Plan  Stable, mild wheezing heard  Last PFTs 3/27/2018: FEV1 58%, FVC 40%, FEV1/FVC 73%)    -continue home inhalers  -titrate supplemental O2 to keep SpO2 88-92%  -IS  -RT consulted      Hyponatremia  Assessment & Plan  Asymptomatic, isoosmolar hyponatremia  Likely secondary to poor intake    -IVF as above    Disposition: Transfer to ICU  Diet: Diabetic diet, under direct supervision of nursing staff.  Pain: See above  Bowel regimen: Per protocol  PT OT: Deferred at this time  DVT PPx: Deferred in the setting of supratherapeutic INR, low threshold to  surgery  CODE STATUS: Full

## 2020-10-31 NOTE — PROGRESS NOTES
Re-examined patient in ICU. Patient reports no significant increase in pain other than when he is moved. Both limbs with severe erythema and blistering, appears consistent with prior demarcation. Patient is certainly at risk for necrotizing fasciitis, but does not appear to have signs of a rapidly progressive infection, rather severe cellulitis. Recommend stat CT scans to look for subcutaneous gas. Discussed options with patient including continued medical management or surgical management. At this time we recommend continued medical management, but if the patient worsens or distinct signs of necrotizing fasciitis develop then our recommendation will be a radical incision and debridement or more likely bilateral above knee amputation. The patient wishes to discuss his goals with his family before any surgical intervention takes place.     Omar Vernon MD  [facility] Orthopedic Surgery  [phone]
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
## 2020-10-31 NOTE — THERAPY
Occupational Therapy   Initial Evaluation     Patient Name: Joshua Medrano  Age:  54 y.o., Sex:  male  Medical Record #: [id number]  Today's Date: 10/31/2020             10/31/20 0830   Interdisciplinary Plan of Care Collaboration   Collaboration Comments OT eval held after conversation with nurse reporting no OOB at this time due to aggressive limb preservation strategy supported by chart indicating OT ordered yet deferred. Will continue to follow

## 2020-10-31 NOTE — ASSESSMENT & PLAN NOTE
Continue SSI, adjust long-acting insulin as nutritional intakes improves postop  Monitor for the need for continuous insulin infusion, doing well postop  Hypoglycemia protocols  A1c 7.8

## 2020-10-31 NOTE — PROGRESS NOTES
Assumed care of PT A&O 4. Pt resting in bed with no signs of labored breathing. On 2 liters oxymask. Tele monitor in place, cardiac rhythm being monitored. Call light within reach, bed in lowest position, upper bed rails up. Pt was updated on plan of care for the day. Will continue to monitor.
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
## 2020-10-31 NOTE — CONSULTS
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 4.70 - 6.10 M/uL    Hemoglobin 19.7 (H) 14.0 - 18.0 g/dL    Hematocrit 60.5 (H) 42.0 - 52.0 %    MCV 91.9 81.4 - 97.8 fL    MCH 29.9 27.0 - 33.0 pg    MCHC 32.6 (L) 33.7 - 35.3 g/dL    RDW 62.0 (H) 35.9 - 50.0 fL    Platelet Count 120 (L) 164 - 446 K/uL    MPV 12.5 9.0 - 12.9 fL    Neutrophils-Polys 93.40 (H) 44.00 - 72.00 %    Lymphocytes 1.20 (L) 22.00 - 41.00 %    Monocytes 2.90 0.00 - 13.40 %    Eosinophils 0.30 0.00 - 6.90 %    Basophils 0.90 0.00 - 1.80 %    Immature Granulocytes 1.30 (H) 0.00 - 0.90 %    Nucleated RBC 0.00 /100 WBC    Neutrophils (Absolute) 19.92 (H) 1.82 - 7.42 K/uL    Lymphs (Absolute) 0.25 (L) 1.00 - 4.80 K/uL    Monos (Absolute) 0.62 0.00 - 0.85 K/uL    Eos (Absolute) 0.07 0.00 - 0.51 K/uL    Baso (Absolute) 0.19 (H) 0.00 - 0.12 K/uL    Immature Granulocytes (abs) 0.28 (H) 0.00 - 0.11 K/uL    NRBC (Absolute) 0.00 K/uL   Basic Metabolic Panel    Collection Time: 10/30/20  5:58 PM   Result Value Ref Range    Sodium 128 (L) 135 - 145 mmol/L    Potassium 4.7 3.6 - 5.5 mmol/L    Chloride 96 96 - 112 mmol/L    Co2 19 (L) 20 - 33 mmol/L    Glucose 163 (H) 65 - 99 mg/dL    Bun 43 (H) 8 - 22 mg/dL    Creatinine 1.66 (H) 0.50 - 1.40 mg/dL    Calcium 8.5 8.5 - 10.5 mg/dL    Anion Gap 13.0  7.0 - 16.0   LDH    Collection Time: 10/30/20  5:58 PM   Result Value Ref Range    LDH Total 338 (H) 107 - 266 U/L   ESTIMATED GFR    Collection Time: 10/30/20  5:58 PM   Result Value Ref Range    GFR If  52 (A) >60 mL/min/1.73 m 2    GFR If Non  43 (A) >60 mL/min/1.73 m 2   ACCU-CHEK GLUCOSE    Collection Time: 10/30/20  6:20 PM   Result Value Ref Range    Glucose - Accu-Ck 167 (H) 65 - 99 mg/dL   FIBRINOGEN    Collection Time: 10/30/20  8:15 PM   Result Value Ref Range    Fibrinogen 446 215 - 460 mg/dL   D-DIMER    Collection Time: 10/30/20  8:15 PM   Result Value Ref Range    D-Dimer Screen 3.91 (H) 0.00 - 0.50 ug/mL (FEU)   LACTIC ACID    Collection Time: 10/30/20 10:14 PM   Result Value Ref Range    Lactic Acid 2.1 (H) 0.5 - 2.0 mmol/L   CBC WITH DIFFERENTIAL    Collection Time: 10/30/20 10:14 PM   Result Value Ref Range    WBC 22.1 (H) 4.8 - 10.8 K/uL    RBC 6.38 (H) 4.70 - 6.10 M/uL    Hemoglobin 18.6 (H) 14.0 - 18.0 g/dL    Hematocrit 57.2 (H) 42.0 - 52.0 %    MCV 89.7 81.4 - 97.8 fL    MCH 29.2 27.0 - 33.0 pg    MCHC 32.5 (L) 33.7 - 35.3 g/dL    RDW 59.4 (H) 35.9 - 50.0 fL    Platelet Count 105 (L) 164 - 446 K/uL    MPV 11.2 9.0 - 12.9 fL    Neutrophils-Polys 94.00 (H) 44.00 - 72.00 %    Lymphocytes 1.40 (L) 22.00 - 41.00 %    Monocytes 3.10 0.00 - 13.40 %    Eosinophils 0.20 0.00 - 6.90 %    Basophils 0.20 0.00 - 1.80 %    Immature Granulocytes 1.10 (H) 0.00 - 0.90 %    Nucleated RBC 0.00 /100 WBC    Neutrophils (Absolute) 20.74 (H) 1.82 - 7.42 K/uL    Lymphs (Absolute) 0.30 (L) 1.00 - 4.80 K/uL    Monos (Absolute) 0.69 0.00 - 0.85 K/uL    Eos (Absolute) 0.05 0.00 - 0.51 K/uL    Baso (Absolute) 0.04 0.00 - 0.12 K/uL    Immature Granulocytes (abs) 0.24 (H) 0.00 - 0.11 K/uL    NRBC (Absolute) 0.00 K/uL   Basic Metabolic Panel    Collection Time: 10/30/20 10:14 PM   Result Value Ref Range    Sodium 125 (L) 135 - 145 mmol/L    Potassium 4.5 3.6 - 5.5 mmol/L    Chloride 93 (L) 96  - 112 mmol/L    Co2 18 (L) 20 - 33 mmol/L    Glucose 164 (H) 65 - 99 mg/dL    Bun 46 (H) 8 - 22 mg/dL    Creatinine 1.82 (H) 0.50 - 1.40 mg/dL    Calcium 8.2 (L) 8.5 - 10.5 mg/dL    Anion Gap 14.0 7.0 - 16.0   ESTIMATED GFR    Collection Time: 10/30/20 10:14 PM   Result Value Ref Range    GFR If  47 (A) >60 mL/min/1.73 m 2    GFR If Non  39 (A) >60 mL/min/1.73 m 2   ACCU-CHEK GLUCOSE    Collection Time: 10/30/20 11:17 PM   Result Value Ref Range    Glucose - Accu-Ck 147 (H) 65 - 99 mg/dL   CBC WITH DIFFERENTIAL    Collection Time: 10/31/20  2:58 AM   Result Value Ref Range    WBC 21.7 (H) 4.8 - 10.8 K/uL    RBC 6.28 (H) 4.70 - 6.10 M/uL    Hemoglobin 18.8 (H) 14.0 - 18.0 g/dL    Hematocrit 57.5 (H) 42.0 - 52.0 %    MCV 91.6 81.4 - 97.8 fL    MCH 29.9 27.0 - 33.0 pg    MCHC 32.7 (L) 33.7 - 35.3 g/dL    RDW 62.5 (H) 35.9 - 50.0 fL    Platelet Count 109 (L) 164 - 446 K/uL    MPV 11.8 9.0 - 12.9 fL    Neutrophils-Polys 90.50 (H) 44.00 - 72.00 %    Lymphocytes 1.40 (L) 22.00 - 41.00 %    Monocytes 3.80 0.00 - 13.40 %    Eosinophils 0.50 0.00 - 6.90 %    Basophils 0.50 0.00 - 1.80 %    Immature Granulocytes 3.30 (H) 0.00 - 0.90 %    Nucleated RBC 0.00 /100 WBC    Neutrophils (Absolute) 19.62 (H) 1.82 - 7.42 K/uL    Lymphs (Absolute) 0.30 (L) 1.00 - 4.80 K/uL    Monos (Absolute) 0.83 0.00 - 0.85 K/uL    Eos (Absolute) 0.10 0.00 - 0.51 K/uL    Baso (Absolute) 0.11 0.00 - 0.12 K/uL    Immature Granulocytes (abs) 0.71 (H) 0.00 - 0.11 K/uL    NRBC (Absolute) 0.00 K/uL   Basic Metabolic Panel    Collection Time: 10/31/20  2:58 AM   Result Value Ref Range    Sodium 125 (L) 135 - 145 mmol/L    Potassium 4.5 3.6 - 5.5 mmol/L    Chloride 95 (L) 96 - 112 mmol/L    Co2 19 (L) 20 - 33 mmol/L    Glucose 107 (H) 65 - 99 mg/dL    Bun 48 (H) 8 - 22 mg/dL    Creatinine 1.94 (H) 0.50 - 1.40 mg/dL    Calcium 8.1 (L) 8.5 - 10.5 mg/dL    Anion Gap 11.0 7.0 - 16.0   Prothrombin Time    Collection Time: 10/31/20   2:58 AM   Result Value Ref Range    PT 39.1 (H) 12.0 - 14.6 sec    INR 3.86 (H) 0.87 - 1.13   ESTIMATED GFR    Collection Time: 10/31/20  2:58 AM   Result Value Ref Range    GFR If  43 (A) >60 mL/min/1.73 m 2    GFR If Non  36 (A) >60 mL/min/1.73 m 2   CBC WITH DIFFERENTIAL    Collection Time: 10/31/20  4:56 AM   Result Value Ref Range    WBC 19.8 (H) 4.8 - 10.8 K/uL    RBC 6.30 (H) 4.70 - 6.10 M/uL    Hemoglobin 18.6 (H) 14.0 - 18.0 g/dL    Hematocrit 57.2 (H) 42.0 - 52.0 %    MCV 90.8 81.4 - 97.8 fL    MCH 29.5 27.0 - 33.0 pg    MCHC 32.5 (L) 33.7 - 35.3 g/dL    RDW 61.4 (H) 35.9 - 50.0 fL    Platelet Count 102 (L) 164 - 446 K/uL    MPV 11.0 9.0 - 12.9 fL    Neutrophils-Polys 91.60 (H) 44.00 - 72.00 %    Lymphocytes 1.20 (L) 22.00 - 41.00 %    Monocytes 3.90 0.00 - 13.40 %    Eosinophils 0.40 0.00 - 6.90 %    Basophils 0.60 0.00 - 1.80 %    Immature Granulocytes 2.30 (H) 0.00 - 0.90 %    Nucleated RBC 0.00 /100 WBC    Neutrophils (Absolute) 18.16 (H) 1.82 - 7.42 K/uL    Lymphs (Absolute) 0.24 (L) 1.00 - 4.80 K/uL    Monos (Absolute) 0.77 0.00 - 0.85 K/uL    Eos (Absolute) 0.08 0.00 - 0.51 K/uL    Baso (Absolute) 0.12 0.00 - 0.12 K/uL    Immature Granulocytes (abs) 0.45 (H) 0.00 - 0.11 K/uL    NRBC (Absolute) 0.00 K/uL   Basic Metabolic Panel    Collection Time: 10/31/20  4:56 AM   Result Value Ref Range    Sodium 123 (L) 135 - 145 mmol/L    Potassium 4.2 3.6 - 5.5 mmol/L    Chloride 94 (L) 96 - 112 mmol/L    Co2 18 (L) 20 - 33 mmol/L    Glucose 114 (H) 65 - 99 mg/dL    Bun 49 (H) 8 - 22 mg/dL    Creatinine 1.91 (H) 0.50 - 1.40 mg/dL    Calcium 8.2 (L) 8.5 - 10.5 mg/dL    Anion Gap 11.0 7.0 - 16.0   ESTIMATED GFR    Collection Time: 10/31/20  4:56 AM   Result Value Ref Range    GFR If  44 (A) >60 mL/min/1.73 m 2    GFR If Non  37 (A) >60 mL/min/1.73 m 2   LACTIC ACID    Collection Time: 10/31/20  7:02 AM   Result Value Ref Range    Lactic Acid 2.0 0.5  - 2.0 mmol/L   proBrain Natriuretic Peptide, NT    Collection Time: 10/31/20  7:02 AM   Result Value Ref Range    NT-proBNP 54717 (H) 0 - 125 pg/mL   ACCU-CHEK GLUCOSE    Collection Time: 10/31/20  9:04 AM   Result Value Ref Range    Glucose - Accu-Ck 114 (H) 65 - 99 mg/dL       Imaging  EC-ECHOCARDIOGRAM COMPLETE W/O CONT   Final Result      DX-CHEST-PORTABLE (1 VIEW)   Final Result         1.  Pulmonary edema and/or infiltrates are identified, which are stable since the prior exam.   2.  Cardiomegaly      US-EXTREMITY ARTERY LOWER BILAT   Final Result      US-EXTREMITY VENOUS LOWER BILAT   Final Result      DX-TIBIA AND FIBULA RIGHT   Final Result         1.  No acute traumatic bony injury.   2.  Tricompartmental degenerative changes of the knee   3.  Atherosclerosis      DX-TIBIA AND FIBULA LEFT   Final Result   Addendum 1 of 1   Addendum: Subtle clustered punctate lucencies lateral to the ankle are    seen which could represent tiny foci of soft tissue gas.      These findings were discussed with the patient's clinician, ELIZABETH HILARIO,    on 10/30/2020 12:34 AM.      Final      DX-CHEST-PORTABLE (1 VIEW)   Final Result         1.  Interstitial pulmonary parenchymal prominence, compatible with interstitial edema and/or infiltrates.   2.  Cardiomegaly          Assessment/Plan  * Sepsis (HCC)  Assessment & Plan  Sepsis due to lower ext infection  Likely nec fasc  Ortho and vascular consulted, likely will need amputation at some point  Vasopressor for map > 65 and sbp > 90  Diuresis as tolerated  Antibiotics, ID consulted  Follow up cultures    Cellulitis  Assessment & Plan  Possible nec fascitis  Ab  Marked on admission  antibiotics    High anion gap metabolic acidosis  Assessment & Plan  Likely from poor perfusion    Supratherapeutic INR- (present on admission)  Assessment & Plan  Likely from sepsis    Paroxysmal atrial fibrillation (HCC)- (present on admission)  Assessment & Plan  Monitor closely  On amiodarone  and metoprolol    LINSEY (acute kidney injury) ([facility])  Assessment & Plan  Acute kidney injury  Diuresis as tolerated  Likely from poor perfusion from sepsis    Elevated LFTs  Assessment & Plan  Likely from poor perfusion    Polycythemia  Assessment & Plan  Daily cbc    Elevated troponin- (present on admission)  Assessment & Plan  Likely type 2 MI from poor perfusion from sepsis, heart failure    Heart failure with preserved ejection fraction, borderline, class IV ([facility])- (present on admission)  Assessment & Plan  Prior hx, current echo appears reduced ef  Diuresis as tolerated  On metoprolol      Type 2 diabetes mellitus with complication, without long-term current use of insulin ([facility])- (present on admission)  Assessment & Plan  ssi    COPD (chronic obstructive pulmonary disease) ([facility])  Assessment & Plan  Not in exacerbation  duonebs prn  Albuterol prn  On seebri bid    Hyponatremia  Assessment & Plan  Has been receiving fluids  Trial lasix, lactic acid normal  Reduced ef on my review of echo  q 4 hour sodium  Us in ICU for better fluid assessment  IVC dilated on formal echo      Discussed patient condition and risk of morbidity and/or mortality with RN, RT, Pharmacy, [facility] resident, Code status disscussed, Charge nurse / hot rounds, Patient and orthopedics, [facility] IM and vascular.      The patient remains critically ill.  Levophed for map > 65 and sbp > 90.  High risk for clinical deterioration and even death without critical care management and monitoring.  Critical care time = 45 minutes in directly providing and coordinating critical care and extensive data review.  No time overlap and excludes procedures.

## 2020-11-01 ENCOUNTER — APPOINTMENT (OUTPATIENT)
Dept: RADIOLOGY | Facility: MEDICAL CENTER | Age: 55
DRG: 853 | End: 2020-11-01
Attending: INTERNAL MEDICINE
Payer: MEDICARE

## 2020-11-01 ENCOUNTER — APPOINTMENT (OUTPATIENT)
Dept: CARDIOLOGY | Facility: MEDICAL CENTER | Age: 55
DRG: 853 | End: 2020-11-01
Attending: INTERNAL MEDICINE
Payer: MEDICARE

## 2020-11-01 LAB
ABO GROUP BLD: ABNORMAL
ANION GAP SERPL CALC-SCNC: 13 MMOL/L (ref 7–16)
ANISOCYTOSIS BLD QL SMEAR: ABNORMAL
BACTERIA BLD CULT: ABNORMAL
BACTERIA BLD CULT: ABNORMAL
BACTERIA WND AEROBE CULT: ABNORMAL
BASOPHILS # BLD AUTO: 0 % (ref 0–1.8)
BASOPHILS # BLD: 0 K/UL (ref 0–0.12)
BLD GP AB SCN SERPL QL: ABNORMAL
BUN SERPL-MCNC: 55 MG/DL (ref 8–22)
BURR CELLS BLD QL SMEAR: NORMAL
CALCIUM SERPL-MCNC: 8.6 MG/DL (ref 8.5–10.5)
CHLORIDE SERPL-SCNC: 96 MMOL/L (ref 96–112)
CK SERPL-CCNC: 156 U/L (ref 0–154)
CO2 SERPL-SCNC: 20 MMOL/L (ref 20–33)
CREAT SERPL-MCNC: 1.93 MG/DL (ref 0.5–1.4)
EOSINOPHIL # BLD AUTO: 0.2 K/UL (ref 0–0.51)
EOSINOPHIL NFR BLD: 0.9 % (ref 0–6.9)
ERYTHROCYTE [DISTWIDTH] IN BLOOD BY AUTOMATED COUNT: 60.2 FL (ref 35.9–50)
ETEST SENSITIVITY ETEST: NORMAL
GLUCOSE BLD-MCNC: 125 MG/DL (ref 65–99)
GLUCOSE BLD-MCNC: 151 MG/DL (ref 65–99)
GLUCOSE BLD-MCNC: 171 MG/DL (ref 65–99)
GLUCOSE SERPL-MCNC: 127 MG/DL (ref 65–99)
GRAM STN SPEC: ABNORMAL
HCT VFR BLD AUTO: 57.9 % (ref 42–52)
HGB BLD-MCNC: 18.8 G/DL (ref 14–18)
INR PPP: 3.49 (ref 0.87–1.13)
LACTATE BLD-SCNC: 2.8 MMOL/L (ref 0.5–2)
LYMPHOCYTES # BLD AUTO: 0.2 K/UL (ref 1–4.8)
LYMPHOCYTES NFR BLD: 0.9 % (ref 22–41)
MACROCYTES BLD QL SMEAR: ABNORMAL
MAGNESIUM SERPL-MCNC: 1.9 MG/DL (ref 1.5–2.5)
MANUAL DIFF BLD: NORMAL
MCH RBC QN AUTO: 29.1 PG (ref 27–33)
MCHC RBC AUTO-ENTMCNC: 32.5 G/DL (ref 33.7–35.3)
MCV RBC AUTO: 89.5 FL (ref 81.4–97.8)
MONOCYTES # BLD AUTO: 0.37 K/UL (ref 0–0.85)
MONOCYTES NFR BLD AUTO: 1.7 % (ref 0–13.4)
MORPHOLOGY BLD-IMP: NORMAL
NEUTROPHILS # BLD AUTO: 21.16 K/UL (ref 1.82–7.42)
NEUTROPHILS NFR BLD: 96.6 % (ref 44–72)
NRBC # BLD AUTO: 0 K/UL
NRBC BLD-RTO: 0 /100 WBC
PLATELET # BLD AUTO: 100 K/UL (ref 164–446)
PLATELET BLD QL SMEAR: NORMAL
PMV BLD AUTO: 11.2 FL (ref 9–12.9)
POIKILOCYTOSIS BLD QL SMEAR: NORMAL
POTASSIUM SERPL-SCNC: 4.6 MMOL/L (ref 3.6–5.5)
PROTHROMBIN TIME: 36.1 SEC (ref 12–14.6)
RBC # BLD AUTO: 6.47 M/UL (ref 4.7–6.1)
RBC BLD AUTO: PRESENT
RH BLD: ABNORMAL
SIGNIFICANT IND 70042: ABNORMAL
SIGNIFICANT IND 70042: ABNORMAL
SITE SITE: ABNORMAL
SITE SITE: ABNORMAL
SODIUM SERPL-SCNC: 129 MMOL/L (ref 135–145)
SOURCE SOURCE: ABNORMAL
SOURCE SOURCE: ABNORMAL
WBC # BLD AUTO: 21.9 K/UL (ref 4.8–10.8)

## 2020-11-01 PROCEDURE — 99292 CRITICAL CARE ADDL 30 MIN: CPT | Performed by: INTERNAL MEDICINE

## 2020-11-01 PROCEDURE — 700105 HCHG RX REV CODE 258: Performed by: INTERNAL MEDICINE

## 2020-11-01 PROCEDURE — 99291 CRITICAL CARE FIRST HOUR: CPT | Performed by: INTERNAL MEDICINE

## 2020-11-01 PROCEDURE — 82962 GLUCOSE BLOOD TEST: CPT | Mod: 91

## 2020-11-01 PROCEDURE — 700102 HCHG RX REV CODE 250 W/ 637 OVERRIDE(OP): Performed by: STUDENT IN AN ORGANIZED HEALTH CARE EDUCATION/TRAINING PROGRAM

## 2020-11-01 PROCEDURE — A9270 NON-COVERED ITEM OR SERVICE: HCPCS | Performed by: STUDENT IN AN ORGANIZED HEALTH CARE EDUCATION/TRAINING PROGRAM

## 2020-11-01 PROCEDURE — 700111 HCHG RX REV CODE 636 W/ 250 OVERRIDE (IP): Performed by: INTERNAL MEDICINE

## 2020-11-01 PROCEDURE — 82550 ASSAY OF CK (CPK): CPT

## 2020-11-01 PROCEDURE — 85610 PROTHROMBIN TIME: CPT

## 2020-11-01 PROCEDURE — 86850 RBC ANTIBODY SCREEN: CPT

## 2020-11-01 PROCEDURE — 99232 SBSQ HOSP IP/OBS MODERATE 35: CPT | Performed by: INTERNAL MEDICINE

## 2020-11-01 PROCEDURE — 85007 BL SMEAR W/DIFF WBC COUNT: CPT

## 2020-11-01 PROCEDURE — 83605 ASSAY OF LACTIC ACID: CPT

## 2020-11-01 PROCEDURE — 99233 SBSQ HOSP IP/OBS HIGH 50: CPT | Performed by: INTERNAL MEDICINE

## 2020-11-01 PROCEDURE — 80048 BASIC METABOLIC PNL TOTAL CA: CPT

## 2020-11-01 PROCEDURE — 36556 INSERT NON-TUNNEL CV CATH: CPT | Mod: GC | Performed by: INTERNAL MEDICINE

## 2020-11-01 PROCEDURE — 83735 ASSAY OF MAGNESIUM: CPT

## 2020-11-01 PROCEDURE — B548ZZA ULTRASONOGRAPHY OF SUPERIOR VENA CAVA, GUIDANCE: ICD-10-PCS | Performed by: INTERNAL MEDICINE

## 2020-11-01 PROCEDURE — 71045 X-RAY EXAM CHEST 1 VIEW: CPT

## 2020-11-01 PROCEDURE — 87040 BLOOD CULTURE FOR BACTERIA: CPT

## 2020-11-01 PROCEDURE — 02HV33Z INSERTION OF INFUSION DEVICE INTO SUPERIOR VENA CAVA, PERCUTANEOUS APPROACH: ICD-10-PCS | Performed by: INTERNAL MEDICINE

## 2020-11-01 PROCEDURE — 700101 HCHG RX REV CODE 250: Performed by: INTERNAL MEDICINE

## 2020-11-01 PROCEDURE — 86900 BLOOD TYPING SEROLOGIC ABO: CPT

## 2020-11-01 PROCEDURE — P9017 PLASMA 1 DONOR FRZ W/IN 8 HR: HCPCS | Mod: 91

## 2020-11-01 PROCEDURE — 94640 AIRWAY INHALATION TREATMENT: CPT

## 2020-11-01 PROCEDURE — 36556 INSERT NON-TUNNEL CV CATH: CPT | Performed by: INTERNAL MEDICINE

## 2020-11-01 PROCEDURE — 770022 HCHG ROOM/CARE - ICU (200)

## 2020-11-01 PROCEDURE — 36430 TRANSFUSION BLD/BLD COMPNT: CPT

## 2020-11-01 PROCEDURE — 86901 BLOOD TYPING SEROLOGIC RH(D): CPT

## 2020-11-01 PROCEDURE — 85027 COMPLETE CBC AUTOMATED: CPT

## 2020-11-01 RX ORDER — SODIUM CHLORIDE 9 MG/ML
INJECTION, SOLUTION INTRAVENOUS CONTINUOUS
Status: DISPENSED | OUTPATIENT
Start: 2020-11-01 | End: 2020-11-02

## 2020-11-01 RX ORDER — FUROSEMIDE 10 MG/ML
40 INJECTION INTRAMUSCULAR; INTRAVENOUS
Status: DISCONTINUED | OUTPATIENT
Start: 2020-11-01 | End: 2020-11-01

## 2020-11-01 RX ORDER — FUROSEMIDE 10 MG/ML
80 INJECTION INTRAMUSCULAR; INTRAVENOUS
Status: DISCONTINUED | OUTPATIENT
Start: 2020-11-01 | End: 2020-11-01

## 2020-11-01 RX ORDER — FUROSEMIDE 10 MG/ML
80 INJECTION INTRAMUSCULAR; INTRAVENOUS
Status: DISCONTINUED | OUTPATIENT
Start: 2020-11-01 | End: 2020-11-03

## 2020-11-01 RX ADMIN — DAPTOMYCIN 760 MG: 350 INJECTION, POWDER, LYOPHILIZED, FOR SOLUTION INTRAVENOUS at 17:14

## 2020-11-01 RX ADMIN — FUROSEMIDE 80 MG: 10 INJECTION, SOLUTION INTRAMUSCULAR; INTRAVENOUS at 17:14

## 2020-11-01 RX ADMIN — SODIUM CHLORIDE: 9 INJECTION, SOLUTION INTRAVENOUS at 14:31

## 2020-11-01 RX ADMIN — DOCUSATE SODIUM 50 MG AND SENNOSIDES 8.6 MG 2 TABLET: 8.6; 5 TABLET, FILM COATED ORAL at 05:42

## 2020-11-01 RX ADMIN — HYDROCODONE BITARTRATE AND ACETAMINOPHEN 1 TABLET: 10; 325 TABLET ORAL at 13:36

## 2020-11-01 RX ADMIN — CLINDAMYCIN IN 5 PERCENT DEXTROSE 900 MG: 18 INJECTION, SOLUTION INTRAVENOUS at 21:14

## 2020-11-01 RX ADMIN — GLYCOPYRROLATE 1 CAPSULE: 15.6 CAPSULE RESPIRATORY (INHALATION) at 08:31

## 2020-11-01 RX ADMIN — CLINDAMYCIN IN 5 PERCENT DEXTROSE 900 MG: 18 INJECTION, SOLUTION INTRAVENOUS at 05:42

## 2020-11-01 RX ADMIN — CLINDAMYCIN IN 5 PERCENT DEXTROSE 900 MG: 18 INJECTION, SOLUTION INTRAVENOUS at 13:33

## 2020-11-01 RX ADMIN — HYDROMORPHONE HYDROCHLORIDE 1 MG: 1 INJECTION, SOLUTION INTRAMUSCULAR; INTRAVENOUS; SUBCUTANEOUS at 15:12

## 2020-11-01 RX ADMIN — DOCUSATE SODIUM 50 MG AND SENNOSIDES 8.6 MG 2 TABLET: 8.6; 5 TABLET, FILM COATED ORAL at 17:14

## 2020-11-01 RX ADMIN — FUROSEMIDE 80 MG: 10 INJECTION, SOLUTION INTRAMUSCULAR; INTRAVENOUS at 10:51

## 2020-11-01 RX ADMIN — GLYCOPYRROLATE 1 CAPSULE: 15.6 CAPSULE RESPIRATORY (INHALATION) at 18:52

## 2020-11-01 RX ADMIN — HYDROMORPHONE HYDROCHLORIDE 1 MG: 1 INJECTION, SOLUTION INTRAMUSCULAR; INTRAVENOUS; SUBCUTANEOUS at 01:35

## 2020-11-01 RX ADMIN — AMIODARONE HYDROCHLORIDE 200 MG: 200 TABLET ORAL at 05:42

## 2020-11-01 RX ADMIN — INSULIN HUMAN 1 UNITS: 100 INJECTION, SOLUTION PARENTERAL at 13:32

## 2020-11-01 RX ADMIN — ALBUTEROL SULFATE 2 PUFF: 90 AEROSOL, METERED RESPIRATORY (INHALATION) at 19:01

## 2020-11-01 RX ADMIN — HYDROMORPHONE HYDROCHLORIDE 1 MG: 1 INJECTION, SOLUTION INTRAMUSCULAR; INTRAVENOUS; SUBCUTANEOUS at 20:13

## 2020-11-01 ASSESSMENT — ENCOUNTER SYMPTOMS
DIARRHEA: 0
HEMOPTYSIS: 0
PHOTOPHOBIA: 0
PALPITATIONS: 0
ABDOMINAL PAIN: 0
NERVOUS/ANXIOUS: 1
BLURRED VISION: 0
SHORTNESS OF BREATH: 1
HEADACHES: 0
SORE THROAT: 0
FEVER: 0
DIZZINESS: 0
SHORTNESS OF BREATH: 0
NERVOUS/ANXIOUS: 0
BACK PAIN: 0
MYALGIAS: 1
SPUTUM PRODUCTION: 0
DEPRESSION: 0
CHILLS: 0
NAUSEA: 0
VOMITING: 0
COUGH: 0

## 2020-11-01 ASSESSMENT — FIBROSIS 4 INDEX: FIB4 SCORE: 4.71

## 2020-11-01 ASSESSMENT — COGNITIVE AND FUNCTIONAL STATUS - GENERAL
TOILETING: TOTAL
SUGGESTED CMS G CODE MODIFIER DAILY ACTIVITY: CL
MOBILITY SCORE: 6
SUGGESTED CMS G CODE MODIFIER MOBILITY: CN
MOVING FROM LYING ON BACK TO SITTING ON SIDE OF FLAT BED: UNABLE
CLIMB 3 TO 5 STEPS WITH RAILING: TOTAL
TURNING FROM BACK TO SIDE WHILE IN FLAT BAD: UNABLE
HELP NEEDED FOR BATHING: TOTAL
EATING MEALS: A LITTLE
DAILY ACTIVITIY SCORE: 9
WALKING IN HOSPITAL ROOM: TOTAL
DRESSING REGULAR LOWER BODY CLOTHING: TOTAL
PERSONAL GROOMING: A LOT
DRESSING REGULAR UPPER BODY CLOTHING: TOTAL
STANDING UP FROM CHAIR USING ARMS: TOTAL
MOVING TO AND FROM BED TO CHAIR: UNABLE

## 2020-11-01 NOTE — PROGRESS NOTES
Infectious Disease Progress Note    Author: Feli Hanson M.D. Date & Time of service: 2020  1:00 PM    Chief Complaint:  Lower extremity cellulitis and necrotic wounds       Interval History:  54-year-old male with known coronary artery disease, atrial fibrillation on chronic anticoagulation, diabetes, who was admitted on 10/29/2020 due to worsening bilateral lower extremity pain, swelling, erythema and necrotic wounds  10/31 AF WBC 22 increased O2 but states less SOB Remains with tender and swollen BLE left greater than right-sloughing eschars   AF WBC 21.9 transferred to ICU-down to nasal cannula- BLE edema improved somewhat. Denies SE abx-Vascular note-patient declining surgery    Labs Reviewed, Medications Reviewed and Wound Reviewed.    Review of Systems:  Review of Systems   Constitutional: Negative for fever.   Respiratory: Negative for cough, hemoptysis and sputum production.    Cardiovascular: Positive for leg swelling.   Musculoskeletal: Positive for myalgias.   All other systems reviewed and are negative.      Hemodynamics:  Temp (24hrs), Av.4 °C (97.5 °F), Min:36.2 °C (97.2 °F), Max:36.6 °C (97.9 °F)  Temperature: 36.2 °C (97.2 °F)  Pulse  Av  Min: 65  Max: 92   Blood Pressure: 113/74       Physical Exam:  Physical Exam  Vitals signs and nursing note reviewed.   Constitutional:       General: He is not in acute distress.     Appearance: He is ill-appearing. He is not diaphoretic.      Comments: disheveled   HENT:      Nose: No congestion or rhinorrhea.      Mouth/Throat:      Pharynx: No oropharyngeal exudate.      Comments: edentulous  Eyes:      General: No scleral icterus.     Extraocular Movements: Extraocular movements intact.      Pupils: Pupils are equal, round, and reactive to light.   Neck:      Musculoskeletal: No muscular tenderness.      Vascular: No carotid bruit.   Cardiovascular:      Rate and Rhythm: Normal rate.      Heart sounds: No murmur.   Pulmonary:    Effort: Pulmonary effort is normal. No respiratory distress.      Breath sounds: No stridor. No wheezing.      Comments: Decreased BS  Abdominal:      General: There is no distension.      Palpations: Abdomen is soft.      Tenderness: There is no abdominal tenderness. There is no guarding.   Musculoskeletal:         General: Tenderness present.      Right lower leg: Edema present.      Left lower leg: Edema present.      Comments: No progression of erythema LLE  BLE edema-improved mostly on right   Skin:     Coloration: Skin is not jaundiced.      Findings: Bruising and erythema present.      Comments: Large bruise right chest wall  Ext eschars sloughing BLE  LLE remains erythematous to groin-no extension   Neurological:      General: No focal deficit present.      Mental Status: He is alert.   Psychiatric:         Mood and Affect: Mood normal.         Behavior: Behavior normal.         Meds:    Current Facility-Administered Medications:   •  furosemide  •  NS  •  clindamycin (CLEOCIN) IV  •  DAPTOmycin  •  norepinephrine (Levophed) infusion  •  HYDROmorphone  •  albuterol  •  amiodarone  •  ipratropium-albuterol  •  glycopyrrolate  •  senna-docusate **AND** polyethylene glycol/lytes **AND** magnesium hydroxide **AND** bisacodyl  •  Respiratory Therapy Consult  •  cloNIDine  •  acetaminophen  •  HYDROcodone/acetaminophen  •  insulin regular **AND** POC Blood Glucose **AND** NOTIFY MD and PharmD **AND** glucose **AND** dextrose 50%    Labs:  Recent Labs     10/30/20  1016 10/30/20  1313 10/30/20  1313 10/31/20  0258 10/31/20  0456 11/01/20  0420   WBC 22.3* 22.4*   < > 21.7* 19.8* 21.9*   RBC 6.75* 6.65*   < > 6.28* 6.30* 6.47*   HEMOGLOBIN 19.8* 19.3*   < > 18.8* 18.6* 18.8*   HEMATOCRIT 61.8* 60.7*   < > 57.5* 57.2* 57.9*   MCV 91.6 91.3   < > 91.6 90.8 89.5   MCH 29.3 29.0   < > 29.9 29.5 29.1   RDW 61.9* 61.5*   < > 62.5* 61.4* 60.2*   PLATELETCT 114* 102*   < > 109* 102* 100*   MPV 11.7 11.5   < > 11.8 11.0 11.2    NEUTSPOLYS 79.00* 86.40*   < > 90.50* 91.60* 96.60*   LYMPHOCYTES 3.00* 0.00*   < > 1.40* 1.20* 0.90*   MONOCYTES 4.00 5.10   < > 3.80 3.90 1.70   EOSINOPHILS 0.00 0.00   < > 0.50 0.40 0.90   BASOPHILS 0.00 1.70   < > 0.50 0.60 0.00   RBCMORPHOLO Present Present  --   --   --  Present    < > = values in this interval not displayed.     Recent Labs     10/30/20  0415 10/30/20  0415 10/31/20  1640 10/31/20  2210 11/01/20  0420   SODIUM  --    < > 127* 127* 129*   POTASSIUM  --    < > 5.2 4.5 4.6   CHLORIDE  --    < > 93* 95* 96   CO2  --    < > 18* 19* 20   GLUCOSE  --    < > 119* 159* 127*   BUN  --    < > 56* 55* 55*   CPKTOTAL 381*  --   --   --  156*    < > = values in this interval not displayed.     Recent Labs     10/29/20  2238 10/30/20  0653 10/30/20  0653 10/31/20  1640 10/31/20  2210 11/01/20  0420   ALBUMIN 3.2 2.6*  --   --   --   --    TBILIRUBIN 1.7* 1.4  --   --   --   --    ALKPHOSPHAT 141* 123*  --   --   --   --    TOTPROTEIN 7.8 6.8  --   --   --   --    ALTSGPT 41 34  --   --   --   --    ASTSGOT 61* 50*  --   --   --   --    CREATININE 1.60* 1.48*   < > 2.07* 2.00* 1.93*    < > = values in this interval not displayed.       Imaging:  Dx-chest-portable (1 View)    Result Date: 10/31/2020    10/31/2020 6:58 AM HISTORY/REASON FOR EXAM: Pleural Effusion TECHNIQUE/EXAM DESCRIPTION:  Single AP view of the chest. COMPARISON: October 29, 2020 FINDINGS: Overlying cardiac leads are present. Cardiomegaly is observed.  Postsurgical changes of sternotomy are noted. The mediastinal contour appears within normal limits.  The central  pulmonary vasculature appears prominent and indistinct. The lungs appear well expanded bilaterally.  Diffuse scattered hazy pulmonary parenchymal opacities are seen. No significant pleural effusions are identified. The bony structures appear age-appropriate.     1.  Pulmonary edema and/or infiltrates are identified, which are stable since the prior exam. 2.   Cardiomegaly    Dx-chest-portable (1 View)    Result Date: 10/30/2020    10/29/2020 11:32 PM HISTORY/REASON FOR EXAM: Shortness of Breath TECHNIQUE/EXAM DESCRIPTION:  Single AP view of the chest. COMPARISON: September 10, 2019 FINDINGS: Overlying cardiac leads are present. Cardiomegaly is observed.  Postsurgical changes of sternotomy are noted. The mediastinal contour appears within normal limits.  The central  pulmonary vasculature appears prominent and indistinct. The lungs appear well expanded bilaterally.  Hazy interstitial bilateral pulmonary opacities are seen. No significant pleural effusions are identified. The bony structures appear age-appropriate.     1.  Interstitial pulmonary parenchymal prominence, compatible with interstitial edema and/or infiltrates. 2.  Cardiomegaly    Dx-tibia And Fibula Left    Addendum Date: 10/30/2020    Addendum: Subtle clustered punctate lucencies lateral to the ankle are seen which could represent tiny foci of soft tissue gas. These findings were discussed with the patient's clinician, ELIZABETH HILARIO, on 10/30/2020 12:34 AM.    Result Date: 10/30/2020  10/29/2020 11:32 PM HISTORY/REASON FOR EXAM: Atraumatic Pain/Swelling/Deformity TECHNIQUE/EXAM DESCRIPTION:  AP and lateral views of the LEFT tibia and fibula. COMPARISON:  None FINDINGS: Tricompartmental degenerative changes of the knee are seen. Bony structures and articulations appear within normal limits without visualized fracture, subluxation, or dislocation. Atherosclerotic changes are seen. Soft tissue phleboliths are seen. Soft tissue edema is noted.     1.  No acute traumatic bony injury. 2.  Tricompartmental degenerative changes of the knee. 3.  Atherosclerosis    Dx-tibia And Fibula Right    Result Date: 10/30/2020  10/29/2020 11:32 PM HISTORY/REASON FOR EXAM: Atraumatic Pain/Swelling/Deformity TECHNIQUE/EXAM DESCRIPTION:  AP and lateral views of the RIGHT tibia and fibula. COMPARISON:  None FINDINGS:  Tricompartmental degenerative changes of the knee are seen, otherwise the bony structures and articulations appear within normal limits without visualized fracture, subluxation, or dislocation. Atherosclerotic changes are seen. Scattered soft tissue level associated.     1.  No acute traumatic bony injury. 2.  Tricompartmental degenerative changes of the knee 3.  Atherosclerosis    Us-extremity Artery Lower Bilat    Result Date: 10/30/2020  Lower Extremity  Arterial Duplex Report  Vascular Laboratory  CONCLUSIONS  1) Bilateral atherosclerosis  2) Right distal SFA 50-75% stenosis.  2) Monophasic waveforms in the left lower extremity  JARRETT WHITE  Exam Date:     10/30/2020 01:09  Room #:     Inpatient  Priority:     Stat  Ht (in):             Wt (lb):  Ordering Physician:        ELIZABETH HILARIO  Referring Physician:       ELIZABETH HILARIO  Sonographer:               Silvia Lassiter RVT, RDMS  Study Type:                Complete Bilateral  Technical Quality:         Adequate  Age:    54    Gender:     M  MRN:    [id number]  :    1965      BSA:  Indications:     Ulceration of LE  CPT Codes:       72281  ICD Codes:       707.1  History:         Nonhealing, raw and bleeding wounds bilaterally. Blue                   discoloration of limbs. Severe pain bilaterally.  Limitations:     Pain.                RIGHT  Waveform        Peak Systolic Velocity (cm/s)                  Prox    Prox-Mid  Mid    Mid-Dist  Distal  Triphasic                         107                      CFA  Triphasic       110                                        PFA  Triphasic       96                89      214      79      SFA  Biphasic                          44                       POP  Biphasic                                           49      AT  Not                                                        PT  attained  Not                                                        BRITTON  attained                LEFT   Waveform        Peak Systolic Velocity (cm/s)                  Prox    Prox-Mid  Mid    Mid-Dist  Distal  Monophasic                        137              109     CFA  Biphasic        67                                         PFA  Monophasic      98                125                      SFA                                                             POP                                                             AT                                                             PT                                                             BRITTON  FINDINGS  Right.  There is atherosclerotic plaque seen throughout the limb.  Stenosis of the distal femoral artery. Velocities are consistent with 50-  75% stenosis.  Waveforms at the common femoral, profunda femoral and femoral artery are  triphasic.  Waveforms at the popliteal and distal anterior tibial artery are biphasic.  The remaining vessels were not properly visualized evaluated due to edema,  severe pain in non-healing bleeding wounds and large body habitus.  Left.  There is atherosclerotic plaque seen throughout the limb.  Waveforms at the common femoral, profunda femoral and femoral artery are  monophasic.  The remaining vessels of the left lower limb were not properly  visualized/evaluated due to edema, severe pain in area of non-healing  bleeding wounds and large body habitus.  Nima Dill MD  (Electronically Signed)  Final Date:      30 October 2020                   03:22    Us-extremity Venous Lower Bilat    Result Date: 10/30/2020   Vascular Laboratory  CONCLUSIONS  1) Normal bilateral superficial and deep venous examination of the lower  extremities.  2) Lower extremity edema, limits diagnostic sensitivity of this exam  JARRETT WHITE  Exam Date:     10/30/2020 00:46  Room #:     Inpatient  Priority:     Stat  Ht (in):             Wt (lb):  Ordering Physician:        ELIZABETH HILARIO  Referring Physician:       [id number] New  Sonographer:                Silvia Lassiter RVT,                             [facility]  Study Type:                Complete Bilateral  Technical Quality:         Adequate  Age:    54    Gender:     M  MRN:    [id number]  :    1965      BSA:  Indications:     Localized swelling, mass and lump, lower limb, bilateral,                   Edema, unspecified, Ulceration of LE  CPT Codes:       93751  ICD Codes:       R22.43  R60.9  707.1  History:         Swelling of bilateral lower limbs. Edema. Nonhealing, raw and                   bleeding wounds bilaterally.  Limitations:     Edema, large body habitus and severe pain in limbs at touch  PROCEDURES:  Bilateral lower extremity venous duplex imaging.  The following venous structures were evaluated: common femoral, profunda  femoral, proximal greater saphenous, femoral, popliteal , peroneal and  posterior tibial veins.  Serial compression, augmentation maneuvers,  color and spectral Doppler  flow evaluations were performed.  FINDINGS:  Bilateral lower extremities  No superficial or deep venous thrombosis.  Complete color filling and compressibility with normal venous flow dynamics  including spontaneous flow, response to augmentation maneuvers, and  respiratory phasicity.  The peroneal and posterior tibial veins are difficult to assess for  compressibility and flow by color flow due to severe pain in limbs through  the nonhealing bleeding wounds and edema.  Interstitial fluid consistent with edema is observed in the thigh and below  the knee.  Edema reduces the image quality.  Nima Dill MD  (Electronically Signed)  Final Date:      2020                   03:19    Ec-echocardiogram Complete W/o Cont    Result Date: 10/31/2020  Transthoracic Echo Report Echocardiography Laboratory CONCLUSIONS Poor quality echo, consider repeating with contrast Probably normal LVEF Mild aortic stenosis. RVSP estimated at 30-35 mmHg. JARRETT WHITE Exam Date:         10/31/2020                    09:44 Exam  Location:     Inpatient Priority:          Routine Ordering Physician:        MORIAH HUI Referring Physician: Sonographer:               Stalin Mtz NICOLÁS Age:    54     Gender:    M MRN:    [id number] :    1965 BSA:    2.58   Ht (in):    77     Wt (lb):    280 Exam Type:     Complete Indications:     Heart Failure, Systolic ICD Codes:       428.2 CPT Codes:       44773 BP:   119    /   67     HR:   73 Technical Quality:       Technically difficult study                          incomplete information is                          obtained MEASUREMENTS  (Male / Female) Normal Values 2D ECHO LVOT Diameter                     2 cm                  DOPPLER AV Peak Velocity                  2.6 m/s               AV Peak Gradient                  27.9 mmHg             AV Mean Gradient                  17.7 mmHg             LVOT Peak Velocity                1.4 m/s               AV Area Cont Eq vti               1.6 cm2               MV Velocity Time Integral         41 cm                 Mitral E Point Velocity           1 m/s                 Mitral E to A Ratio               1.1                   Mitral A Duration                 96.9 ms               MV Pressure Half Time             102 ms                MV Area PHT                       2.2 cm2               MV Deceleration Time              352 ms                TR Peak Velocity                  258 cm/s              PV Peak Velocity                  1.3 m/s               PV Peak Gradient                  6.5 mmHg              PV Mean Gradient                  3.9 mmHg              * Indicates values subject to auto-interpretation LV EF:        % FINDINGS Left Ventricle Normal left ventricular chamber size. Normal left ventricular wall thickness. Unable to determine diastolic function. Reliable ejection fraction estimate cannot be made due to technical limitation. Right Ventricle Right ventricle not well visualized. Right Atrium Dilated inferior vena cava  "with inspiratory collapse. Left Atrium Left atrium not well visualized. Mitral Valve Mitral annular calcification. No stenosis or regurgitation seen. Aortic Valve The aortic valve is not well visualized. Mild aortic stenosis. Vmax is 2.5  m/s. Transvalvular gradients are - Peak: 26 mmHg, Mean: 18 mmHg. No aortic insufficiency. Tricuspid Valve Structurally normal tricuspid valve. No stenosis or regurgitation seen. RVSP estimated at 30-35 mmHg Pulmonic Valve Structurally normal pulmonic valve. No pulmonic stenosis. Mild pulmonic insufficiency. Pericardium Normal pericardium without effusion. Aorta Normal aortic root for body surface area. Ascending aorta diameter is 3.2 cm. Quinton Parkinson MD (Electronically Signed) Final Date:     31 October 2020                 12:02      Micro:  Results     Procedure Component Value Units Date/Time    BLOOD CULTURE [[id number]]     Order Status: No result Specimen: Blood from Peripheral     BLOOD CULTURE [[id number]]     Order Status: No result Specimen: Blood from Peripheral     E-Test [[id number]] Collected: 10/29/20 2338    Order Status: Completed Specimen: Other Updated: 11/01/20 0839     ETEST Sensitivity FINAL    Narrative:      171 tel. [phone] 10/31/2020, 11:38, RB PERF. RESULTS CALLED TO:[id number]  Per Hospital Policy: Only change Specimen Src: to \"Line\" if  specified by physician order.    BLOOD CULTURE x2 [[id number]]  (Abnormal)  (Susceptibility) Collected: 10/29/20 2338    Order Status: Completed Specimen: Blood from Peripheral Updated: 11/01/20 0839     Significant Indicator POS     Source BLD     Site PERIPHERAL     Culture Result Growth detected by Bactec instrument. 10/30/2020  12:52      Beta Hemolytic Streptococcus group A  This isolate is presumed to be clindamycin resistant based on  detection of inducible resistance.  Clindamycin may still  be effective in some patients.      Narrative:      CALL  [place]  171 tel. [phone],  CALLED  171 tel. [phone] 10/31/2020, " "11:38, RB PERF. RESULTS CALLED  TO:[phone]  Per Hospital Policy: Only change Specimen Src: to \"Line\" if  specified by physician order.  Right AC    Susceptibility     Beta hemolytic streptococcus group a (1)     Antibiotic Interpretation Microscan Method Status    Penicillin Sensitive 0.032 mcg/mL E-TEST Final    Cefotaxime Sensitive 0.023 mcg/mL E-TEST Final                   CULTURE WOUND W/ GRAM STAIN [[id number]]  (Abnormal)  (Susceptibility) Collected: 10/30/20 0103    Order Status: Completed Specimen: Wound from Abscess Updated: 11/01/20 0756     Significant Indicator POS     Source WND     Site Bilateral Lower Extremity     Culture Result Light growth mixed skin eloy.     Gram Stain Result Moderate Gram positive cocci.  Rare small Gram positive rods.       Culture Result Staphylococcus aureus  Heavy growth        Beta Hemolytic Streptococcus group A  Heavy growth      Narrative:      CALL  [place]  171 tel. [phone],  CALLED  171 tel. [phone] 10/31/2020, 11:44, RB PERF. RESULTS CALLED  TO:[id number] RN    Susceptibility     Staphylococcus aureus (1)     Antibiotic Interpretation Microscan Method Status    Azithromycin Resistant >4 mcg/mL JELANI Final    Clindamycin Sensitive <=0.5 mcg/mL JELANI Final    Cefazolin Sensitive <=8 mcg/mL JELANI Final    Ceftaroline Sensitive <=0.5 mcg/mL JELANI Final    Daptomycin Sensitive <=1 mcg/mL JELANI Final    Ampicillin/sulbactam Sensitive <=8/4 mcg/mL JELANI Final    Erythromycin Resistant >4 mcg/mL JELANI Final    Vancomycin Sensitive 1 mcg/mL JELANI Final    Oxacillin Sensitive 0.5 mcg/mL JELANI Final    Penicillin Resistant >8 mcg/mL JELANI Final    Pip/Tazobactam Sensitive <=4 mcg/mL JELANI Final    Trimeth/Sulfa Sensitive <=0.5/9.5 mcg/mL JELANI Final    Tetracycline Sensitive <=4 mcg/mL JELANI Final                   BLOOD CULTURE x2 [[id number]] Collected: 10/29/20 2336    Order Status: Completed Specimen: Blood from Peripheral Updated: 10/31/20 0805     Significant Indicator NEG     Source BLD     Site " "PERIPHERAL     Culture Result No Growth  Note: Blood cultures are incubated for 5 days and  are monitored continuously.Positive blood cultures  are called to the RN and reported as soon as  they are identified.      Narrative:      Per Hospital Policy: Only change Specimen Src: to \"Line\" if  specified by physician order.  Right Forearm/Arm    GRAM STAIN [[id number]] Collected: 10/30/20 0103    Order Status: Completed Specimen: Wound Updated: 10/30/20 2155     Significant Indicator .     Source WND     Site Bilateral Lower Extremity     Gram Stain Result Moderate Gram positive cocci.  Rare small Gram positive rods.      Urinalysis [[id number]]  (Abnormal) Collected: 10/30/20 0634    Order Status: Completed Specimen: Urine, Cath Updated: 10/30/20 0733     Color DK Yellow     Character Turbid     Specific Gravity 1.027     Ph 5.0     Glucose Negative mg/dL      Ketones Negative mg/dL      Protein 100 mg/dL      Bilirubin Moderate     Urobilinogen, Urine 1.0     Nitrite Negative     Leukocyte Esterase Trace     Occult Blood Moderate     Micro Urine Req Microscopic    Narrative:      If not done within the last 24 hours    URINALYSIS [[id number]]     Order Status: Canceled Specimen: Urine, Cath     SARS-CoV-2, PCR (In-House) [[id number]] Collected: 10/30/20 0126    Order Status: Completed Updated: 10/30/20 0242     SARS-CoV-2 Source NP Swab     SARS-CoV-2 by PCR NotDetected     Comment: [facility] providers: PLEASE REFER TO DE-ESCALATION AND RETESTING PROTOCOL  on [email or web address]  **The [employer] GeneXpert Xpress SARS-CoV-2 Test has been made available for  use under the Emergency Use Authorization (EUA) only.         Narrative:      Is patient being admitted?->Yes  Does this patient meet criteria for Rush/Cepheid per [facility]  Inpatient Workflow? (See workflow link below)->Yes  Expected turn around time?->Rush (Cepheid 2-4 hours)  Is this the patients First SARS CoV-2 test?->Unknown  Is this patient employed in healthcare?->No  Is " the patient symptomatic as defined by the CDC?->Unknown  Is the patient hospitalized?->Yes  Is the patient in the ICU?->Unknown  Is the patient a resident in a congregate care setting?->No  Is the patient pregnant?->No    COVID/SARS CoV-2 PCR [[id number]] Collected: 10/30/20 0126    Order Status: Completed Specimen: Respirate from Nasopharyngeal Updated: 10/30/20 0142     COVID Order Status Received     Comment: The order for SARS CoV-2 testing has been received by the  Laboratory. This result is neither positive nor negative.  Final results of testing will report in 24-48 hours, separately.         Narrative:      Is patient being admitted?->Yes  Does this patient meet criteria for Rush/Cepheid per [facility]  Inpatient Workflow? (See workflow link below)->Yes  Expected turn around time?->Rush (Cepheid 2-4 hours)  Is this the patients First SARS CoV-2 test?->Unknown  Is this patient employed in healthcare?->No  Is the patient symptomatic as defined by the CDC?->Unknown  Is the patient hospitalized?->Yes  Is the patient in the ICU?->Unknown  Is the patient a resident in a congregate care setting?->No  Is the patient pregnant?->No    Blood Culture [[id number]] Collected: 10/30/20 0000    Order Status: Canceled Specimen: Other from Peripheral     Blood Culture [[id number]] Collected: 10/30/20 0000    Order Status: Canceled Specimen: Other from Peripheral           Assessment:  Active Hospital Problems    Diagnosis   • *Sepsis ([facility]) [A41.9]   • Cellulitis [L03.90]   • High anion gap metabolic acidosis [E87.2]   • Supratherapeutic INR [R79.1]   • Paroxysmal atrial fibrillation ([facility]) [I48.0]   • LINSEY (acute kidney injury) ([facility]) [N17.9]   • Polycythemia [D75.1]   • Elevated troponin [R77.8]   • Heart failure with preserved ejection fraction, borderline, class IV ([facility]) [I50.30]   • Type 2 diabetes mellitus with complication, without long-term current use of insulin ([facility]) [E11.8]   • COPD (chronic obstructive pulmonary disease)  ([facility]) [J44.9]   • Hyponatremia [E87.1]   • Elevated LFTs [R79.89]       Plan:  Lower extremity cellulitis and necrotic wounds Unstable  Sepsis, GAS  Necrotizing cellulitis, possible necrotizing fasciitis  Afebrile  Persistent leukocytosis  Acute kidney injury  Demand ischemia with elevation of his troponins and proBNP  PENICILLIN ALLERGIC  Wound cxs SA and GAS  Blood cxs GAS 10/29 and 10/30  Continue Dapto/clinda-repeat CPK improved.  Appreciate surgery eval  Tetanus updated     LINSEY, unstable  Avoid nephrotoxic agents  Dose adjust abx as needed    Diabetes  Keep BS under 150 to help control current infection    High risk for limb loss    Prognosis guarded    [facility]

## 2020-11-01 NOTE — PROGRESS NOTES
"CARDIOLOGY FOLLOW UP    Impressions:  #.  Strep bacteremia, 1 of 6 cultures, related to lower extremity wounds  #.  Acute decompensated diastolic heart failure  #.  Bilateral venous stasis ulceration  #.  Severe PAD  #.  History of two-vessel bypass in 2017, RCA  not revascularized  #.  Ongoing tobacco use  #.  Severe obesity    Recommendations:  Increase diuresis    No need for JIM, if bacteremia persists would be reasonable    Management of arterial and venous disease per vascular      Will follow    SUBJECTIVE/INTERIM EVENTS:  Failed to diurese    EXAM:  /74   Pulse 83   Temp 36.2 °C (97.2 °F) (Temporal)   Resp 20   Ht 1.956 m (6' 5\")   Wt (!) 158.8 kg (350 lb 1.5 oz)   SpO2 91%   BMI 41.51 kg/m²   Gen: Chronically ill-appearing, obese  HEENT: Symmetric face. Anicteric sclerae. Moist mucus membranes  NECK: Jugular vein undetectable due to body habitus. No lymphadenopathy  CARDIAC: Normal PMI, regular, normal S1, S2. with a systolic murmur  VASCULATURE: Normal carotid amplitude without bruit.   RESP: Clear to auscultation bilaterally  ABD: Soft, non-tender, non-distended  EXT: 2+ lower extremity edema Bilateral wrapping of the calf through hindfoot.  Weeping wounds present beneath bandages over the gaiter region.  The legs are erythematous and edematous., no clubbing or cyanosis  SKIN: Warm and dry  NEURO: No gross deficits  PSYCH: Appropriate affect, participates in conversation    Studies  Lab Results   Component Value Date/Time    SODIUM 129 (L) 11/01/2020 04:20 AM    POTASSIUM 4.6 11/01/2020 04:20 AM    CHLORIDE 96 11/01/2020 04:20 AM    CO2 20 11/01/2020 04:20 AM    GLUCOSE 127 (H) 11/01/2020 04:20 AM    BUN 55 (H) 11/01/2020 04:20 AM    CREATININE 1.93 (H) 11/01/2020 04:20 AM       For this encounter I directly reviewed ECG tracings, Echo images and medical records      Current Facility-Administered Medications:   •  furosemide (LASIX) injection 80 mg, 80 mg, Intravenous, BID DIURETIC, " Quinton Parkinson M.D.  •  clindamycin (CLEOCIN) IVPB premix 900 mg, 900 mg, Intravenous, Q8HRS, Feli Hanson M.D., Stopped at 11/01/20 0642  •  DAPTOmycin 760 mg in NS 50 mL IVPB, 6 mg/kg, Intravenous, Q24HR, Feli Hanson M.D., Last Rate: 100 mL/hr at 10/31/20 1613, 760 mg at 10/31/20 1613  •  norepinephrine (Levophed) infusion 8 mg/250 mL (premix), 0-30 mcg/min, Intravenous, Continuous, Jodi Small M.D., Last Rate: 3.8 mL/hr at 10/31/20 2259, 2 mcg/min at 10/31/20 2259  •  HYDROmorphone pf (DILAUDID) injection 0.5-1 mg, 0.5-1 mg, Intravenous, Q2HRS PRN, Meng Seymour M.D., 1 mg at 11/01/20 0135  •  albuterol inhaler 2 Puff, 2 Puff, Inhalation, Q6HRS PRN, Jayro Vásquez M.D., 2 Puff at 10/31/20 1935  •  amiodarone (Cordarone) tablet 200 mg, 200 mg, Oral, QAM, Jayro Vásquez M.D., 200 mg at 11/01/20 0542  •  ipratropium-albuterol (DUONEB) nebulizer solution, 3 mL, Nebulization, Q6HRS PRN (RT), Jayro Vásquez M.D.  •  glycopyrrolate (Seebri) 15.6 mcg inhalation capsule 1 Cap, 1 Cap, Inhalation, BID (RT), Jayro Vásquez M.D., 1 Cap at 11/01/20 0831  •  senna-docusate (PERICOLACE or SENOKOT S) 8.6-50 MG per tablet 2 Tab, 2 Tab, Oral, BID, 2 Tab at 11/01/20 0542 **AND** polyethylene glycol/lytes (MIRALAX) PACKET 1 Packet, 1 Packet, Oral, QDAY PRN **AND** magnesium hydroxide (MILK OF MAGNESIA) suspension 30 mL, 30 mL, Oral, QDAY PRN **AND** bisacodyl (DULCOLAX) suppository 10 mg, 10 mg, Rectal, QDAY PRN, Artem Givens M.D.  •  Respiratory Therapy Consult, , Nebulization, Continuous RT, Artem Givens M.D.  •  cloNIDine (CATAPRES) tablet 0.1 mg, 0.1 mg, Oral, Q6HRS PRN, Artem Givens M.D.  •  acetaminophen (TYLENOL) tablet 500 mg, 500 mg, Oral, Q6HRS PRN, Artem Givens M.D.  •  HYDROcodone/acetaminophen (NORCO)  MG per tablet 1 Tab, 1 Tab, Oral, Q6HRS PRN, Artem Givens M.D., 1 Tab at 10/31/20 1928  •  insulin regular (HumuLIN R,NovoLIN R) injection, 1-6 Units, Subcutaneous, Q6HRS, Stopped at  10/30/20 0600 **AND** POC Blood Glucose, , , Q6H **AND** NOTIFY MD and PharmD, , , Once **AND** glucose 4 g chewable tablet 16 g, 16 g, Oral, Q15 MIN PRN **AND** dextrose 50% (D50W) injection 50 mL, 50 mL, Intravenous, Q15 MIN PRN, Artem Givens M.D.

## 2020-11-01 NOTE — PROGRESS NOTES
[facility] ICU Progress Note      Admit Date: 10/29/2020    Resident(s): Dayron Uribe M.D.  Attending: SHIVAM K[facility]IS[facility]/ Dr. Cole    Date & Time:   11/1/2020   3:06 PM       Patient ID:    Name:             Joshua Medrano   YOB: 1965  Age:                 54 y.o.  male   MRN:               [id number]    Diagnosis:  Sepsis secondary to lower extremity infection.      ID:  Mr. Medrano is a 54 y.o. male w/ CAD/CABG 2019, Afib, COPD, DM, and PAD who presented 10/29/2020 with LLE cellulitis, strep sepsis, and severe leg pain. Cardiology, vascular surgery, orthopedic surgery and ID are on board. Patient was transferred to the ICU on 10/31 for worsening cardiac function, dropping sodium, increasing oxygen demands and hypotension in the setting of sepsis.      Interval Events:  No acute events overnight.  Patient denies any new or worsening pain. He is still indecisive about the treatment plan and surgery. Wants to discuss that route further with his family of 9 brothers and 2 sisters.  LRINEC score for nec fasc is 5. But this does not rule out nec fasc.  On levophed  Continue lasix; dose increased per Cardiology recommendations.  Hold metoprolol.  Continue antibiotics per ID recommendations.  Was on warfarin for A fib, held now. INR elevated.  Pulses with doppler  Diabetic diet.  Ceballos in place.  Palliative consult pending. Will need discussion with family.    Update: PICC cannot be placed due to bacteremia. Patient agreed for central line placement. FFP ordered for reversal of elevated INR.    ROS:  Review of Systems   Constitutional: Positive for malaise/fatigue. Negative for chills and fever.   HENT: Negative for congestion and sore throat.    Eyes: Negative for blurred vision and photophobia.   Respiratory: Negative for sputum production and shortness of breath.    Cardiovascular: Positive for leg swelling. Negative for chest pain and palpitations.   Gastrointestinal: Negative for abdominal pain,  nausea and vomiting.   Genitourinary: Negative for dysuria and urgency.   Neurological: Negative for dizziness and headaches.   Psychiatric/Behavioral: Negative for depression. The patient is nervous/anxious.        VITALS:  Pulse: (!) 103  Blood Pressure: 115/65       Temp  Av.4 °C (97.6 °F)  Min: 36.2 °C (97.2 °F)  Max: 36.7 °C (98.1 °F)         Physical Exam:  GEN: Obese male in no apparent distress. Alert and oriented.  HEENT: NC/AT.  CV:  Tachycardic, RR, no m/r/g, normal s1/s2.  RESP: CTAB, No wheezes or rales. Ronchi heard.  ABD: Soft, NT/ND, BS+  EXT: Edematous, erythematous weeping sores on bilateral lower extremities, severe tenderness to palpation. Erythema and swelling extends to upper thighs bilaterally. Delayed capillary refill.  NEURO: Alert and oriented x 3, no focal deficits.    Consultants:  Critical care  Cardiology  Vascular surgery  Orthopedic surgery  ID      Procedures:  None      Fluids:  Intake/Output       10/30/20 0700 - 10/31/20 0659 10/31/20 0700 - 20 0659 20 07 - 20 0659      [id number] 2930-5646 Total 0993-3985 9214-9864 Total 2896-0982 6273-2399 Total       Intake    P.O.  --  240 240  240  150 390  --  -- --    P.O. -- 240 240 240 150 390 -- -- --    I.V.  --  -- --  --  19.1 19.1  15.2  -- 15.2    Norepinephrine Volume -- -- -- -- 19.1 19.1 15.2 -- 15.2    IV Piggyback  350  -- 350  100  -- 100  --  -- --    Volume (mL) (clindamycin (CLEOCIN) IVPB premix 900 mg) 50 -- 50 -- -- -- -- -- --    Volume (mL) (Linezolid (ZYVOX) premix 600 mg) 300 -- 300 -- -- -- -- -- --    Volume (mL) (clindamycin (CLEOCIN) IVPB premix 900 mg) -- -- -- 50 -- 50 -- -- --    Volume (mL) (DAPTOmycin 760 mg in NS 50 mL IVPB) -- -- -- 50 -- 50 -- -- --    Total Intake 350 240 590 340 169.1 509.1 15.2 -- 15.2       Output    Urine  300  200 500  650  1025 1675  1950  -- 1950    Number of Times Voided -- -- -- 1 x -- 1 x -- -- --    Urine Void (mL) 300 200 500 325 -- 325 -- -- --     Output (mL) (Urethral Catheter Temperature probe) -- -- -- 325 1025 1350 1950 -- 1950    Stool  --  -- --  --  -- --  --  -- --    Number of Times Stooled -- -- -- 0 x -- 0 x -- -- --    Total Output 300 200  1675 1950 -- 1950       Net I/O     50 40 90 -310 -855.9 -1165.9 -1934.8 -- -1934.8        Weight: (!) 158.8 kg (350 lb 1.5 oz)  Recent Labs     10/30/20  0653 10/30/20  0653 10/31/20  1640 10/31/20  2210 11/01/20  0420   SODIUM 130*   < > 127* 127* 129*   POTASSIUM 4.2   < > 5.2 4.5 4.6   CHLORIDE 97   < > 93* 95* 96   CO2 22   < > 18* 19* 20   BUN 40*   < > 56* 55* 55*   CREATININE 1.48*   < > 2.07* 2.00* 1.93*   MAGNESIUM 1.6  --   --   --  1.9   CALCIUM 8.4*   < > 8.2* 8.3* 8.6    < > = values in this interval not displayed.     Body mass index is 41.51 kg/m².        Respiratory:     Respiration: 20, Pulse Oximetry: 93 %    Chest Tube Drains:    Recent Labs     10/31/20  1411   ISTATAPH 7.320*   ISTATAPCO2 34.4   ISTATAPO2 73   ISTATATCO2 19*   OTTBVIP6BRZ 93   ISTATARTHCO3 17.7   ISTATARTBE -7*   ISTATTEMP see below   ISTATFIO2 2   ISTATSPEC Arterial       HemoDynamics:  Pulse: (!) 103 Blood Pressure: 115/65      GI/Nutrition:  Recent Labs     10/29/20  2238 10/30/20  0653 10/30/20  0653 10/31/20  1640 10/31/20  2210 11/01/20  0420   ALTSGPT 41 34  --   --   --   --    ASTSGOT 61* 50*  --   --   --   --    ALKPHOSPHAT 141* 123*  --   --   --   --    TBILIRUBIN 1.7* 1.4  --   --   --   --    GAMMAGT  --  61*  --   --   --   --    GLUCOSE 142* 135*   < > 119* 159* 127*    < > = values in this interval not displayed.       Heme:  Recent Labs     10/30/20  0100 10/30/20  0100 10/30/20  1313 10/30/20  1313 10/31/20  0258 10/31/20  0456 11/01/20  0420   RBC  --    < > 6.65*   < > 6.28* 6.30* 6.47*   HEMOGLOBIN  --    < > 19.3*   < > 18.8* 18.6* 18.8*   HEMATOCRIT  --    < > 60.7*   < > 57.5* 57.2* 57.9*   PLATELETCT  --    < > 102*   < > 109* 102* 100*   PROTHROMBTM 43.8*  --  39.7*  --  39.1*  --   36.1*   APTT 68.0*  --   --   --   --   --   --    INR 4.46*  --  3.94*  --  3.86*  --  3.49*    < > = values in this interval not displayed.       Infectious Disease:  Temp  Av.4 °C (97.6 °F)  Min: 36.2 °C (97.2 °F)  Max: 36.7 °C (98.1 °F)  Recent Labs     10/29/20  2238 10/30/20  0653 10/30/20  0653 10/31/20  0258 10/31/20  0456 20  0420   WBC 19.7* 22.7*   < > 21.7* 19.8* 21.9*   NEUTSPOLYS 94.80* 91.20*   < > 90.50* 91.60* 96.60*   LYMPHOCYTES 0.80* 0.70*   < > 1.40* 1.20* 0.90*   MONOCYTES 3.20 2.90   < > 3.80 3.90 1.70   EOSINOPHILS 0.20 0.00   < > 0.50 0.40 0.90   BASOPHILS 0.40 0.00   < > 0.50 0.60 0.00   ASTSGOT 61* 50*  --   --   --   --    ALTSGPT 41 34  --   --   --   --    ALKPHOSPHAT 141* 123*  --   --   --   --    TBILIRUBIN 1.7* 1.4  --   --   --   --     < > = values in this interval not displayed.       Imaging:  CT-EXTREMITY, LOWER W/O RIGHT   Final Result      1.  No discrete fluid collection is identified to suggest abscess.      2.  Diffuse subcutaneous edema and overlying skin thickening with venous varicosities.      3.  Atherosclerosis.      4.  Diffuse muscle atrophy.      CT-EXTREMITY, LOWER W/O LEFT   Final Result      1.  Left leg cellulitis without abscess or soft tissue gas      2.  Small vessel atherosclerotic plaque      3.  osteoarthritis of the left knee and left midfoot      4.  Muscular atrophy      EC-ECHOCARDIOGRAM COMPLETE W/O CONT   Final Result      DX-CHEST-PORTABLE (1 VIEW)   Final Result         1.  Pulmonary edema and/or infiltrates are identified, which are stable since the prior exam.   2.  Cardiomegaly      US-EXTREMITY ARTERY LOWER BILAT   Final Result      US-EXTREMITY VENOUS LOWER BILAT   Final Result      DX-TIBIA AND FIBULA RIGHT   Final Result         1.  No acute traumatic bony injury.   2.  Tricompartmental degenerative changes of the knee   3.  Atherosclerosis      DX-TIBIA AND FIBULA LEFT   Final Result   Addendum 1 of 1   Addendum: Subtle  clustered punctate lucencies lateral to the ankle are    seen which could represent tiny foci of soft tissue gas.      These findings were discussed with the patient's clinician, ELIZABETH HILARIO,    on 10/30/2020 12:34 AM.      Final      DX-CHEST-PORTABLE (1 VIEW)   Final Result         1.  Interstitial pulmonary parenchymal prominence, compatible with interstitial edema and/or infiltrates.   2.  Cardiomegaly      EC-ECHOCARDIOGRAM LTD W/ CONT    (Results Pending)   IR-MIDLINE CATHETER INSERTION WO GUIDANCE > AGE 3    (Results Pending)         Meds:  Current Facility-Administered Medications   Medication Dose Frequency Provider Last Rate Last Admin   • furosemide (LASIX) injection 80 mg  80 mg BID DIURETIC Quinton Parkinson M.D.   80 mg at 11/01/20 1051   • NS infusion   Continuous Aamir Cole D.O.       • clindamycin (CLEOCIN) IVPB premix 900 mg  900 mg Q8HRS Feli Hanson M.D. 50 mL/hr at 11/01/20 1333 900 mg at 11/01/20 1333   • DAPTOmycin 760 mg in NS 50 mL IVPB  6 mg/kg Q24HR Feli Hanson M.D. 100 mL/hr at 10/31/20 1613 760 mg at 10/31/20 1613   • norepinephrine (Levophed) infusion 8 mg/250 mL (premix)  0-30 mcg/min Continuous Jodi Small M.D. 3.8 mL/hr at 10/31/20 2259 2 mcg/min at 10/31/20 2259   • HYDROmorphone pf (DILAUDID) injection 0.5-1 mg  0.5-1 mg Q2HRS PRN Meng Seymour M.D.   1 mg at 11/01/20 0135   • albuterol inhaler 2 Puff  2 Puff Q6HRS PRN Jayro Vásquez M.D.   2 Puff at 10/31/20 1935   • amiodarone (Cordarone) tablet 200 mg  200 mg QAM Jayro Vásquez M.D.   200 mg at 11/01/20 0542   • ipratropium-albuterol (DUONEB) nebulizer solution  3 mL Q6HRS PRN (RT) Jayro Vásquez M.D.       • glycopyrrolate (Seebri) 15.6 mcg inhalation capsule 1 Cap  1 Cap BID (RT) Jayro Vásquez M.D.   1 Cap at 11/01/20 0831   • senna-docusate (PERICOLACE or SENOKOT S) 8.6-50 MG per tablet 2 Tab  2 Tab BID Artem Givens M.D.   2 Tab at 11/01/20 0542    And   • polyethylene glycol/lytes (MIRALAX) PACKET 1  Packet  1 Packet QDAY PRN Artem Givens M.D.        And   • magnesium hydroxide (MILK OF MAGNESIA) suspension 30 mL  30 mL QDAY PRN Artem Givens M.D.        And   • bisacodyl (DULCOLAX) suppository 10 mg  10 mg QDAY PRN Artem Givens M.D.       • Respiratory Therapy Consult   Continuous RT Artem Givens M.D.       • cloNIDine (CATAPRES) tablet 0.1 mg  0.1 mg Q6HRS PRN Artem Givens M.D.       • acetaminophen (TYLENOL) tablet 500 mg  500 mg Q6HRS PRN Artem Givens M.D.       • HYDROcodone/acetaminophen (NORCO)  MG per tablet 1 Tab  1 Tab Q6HRS PRN Artem Givens M.D.   1 Tab at 11/01/20 1336   • insulin regular (HumuLIN R,NovoLIN R) injection  1-6 Units Q6HRS Artem Givens M.D.   1 Units at 11/01/20 1332    And   • glucose 4 g chewable tablet 16 g  16 g Q15 MIN PRN Artem Givens M.D.        And   • dextrose 50% (D50W) injection 50 mL  50 mL Q15 MIN PRN Artem Givens M.D.       Last reviewed on 10/30/2020  9:54 AM by Vinny Garcia      Assessment and plan    * Sepsis (HCC)  Assessment & Plan  Likely from infection in bilateral lower extremities  ? Necrotizing fasciitis  Orthopedic surgery and Vascular surgery along with ID on board; appreciate recs  Continue antibiotics  Continue diuretics  Follow blood cultures  Maintain MAP > 65  Will most likely need debridement vs amputation    Cellulitis  Assessment & Plan  ? Necrotizing fasciitis  Continue antibiotics per ID recs; appreciate recs  Vascular and ortho surgery on board  Pain management    High anion gap metabolic acidosis  Assessment & Plan  Likely from sepsis and poor perfusion    Supratherapeutic INR- (present on admission)  Assessment & Plan  At admission INR 4.46  Likely from sepsis  Hold warfarin  Reversal with FFP 11/1 for central line placement    Paroxysmal atrial fibrillation (HCC)- (present on admission)  Assessment & Plan  Controlled on metoprolol, amiodarone. [id number]: 4 points. HASBLED: 2 points.  On warfarin with supratherapeutic INR  (4.46 10/30/2020); likely from sepsis  Plan:  Hold metoprolol since on pressors  Hold warfarin    LINSEY (acute kidney injury) ([facility])  Assessment & Plan  Likely from sepsis  Continue diuresis    Elevated LFTs  Assessment & Plan  Likely from sepsis    Polycythemia  Assessment & Plan  Chronic  Likely 2/2 chronic hypoxia due to COPD vs SHASHANK  OP follow up    Heart failure with preserved ejection fraction, borderline, class IV ([facility])- (present on admission)  Assessment & Plan  Continue diuresis  Hold metoprolol due to pressor support at this time    Type 2 diabetes mellitus with complication, without long-term current use of insulin ([facility])- (present on admission)  Assessment & Plan  Stable. Last A1c 6.6 (8/2019)  ISS  A1c  Hypoglycemia protocol    COPD (chronic obstructive pulmonary disease) ([facility])  Assessment & Plan  Stable  Continue home inhalers  Supplemental O2  RT protocol    Hyponatremia  Assessment & Plan  IVC dilated on ECHO  Continue diuresis  Monitor sodium      DISPO: ICU    CODE STATUS: FULL    Quality Measures:  Ceballos Catheter: Yes  DVT Prophylaxis: Elevated INR from warfarin  Ulcer Prophylaxis: Not indicated  Antibiotics: Clindamycin and Daptomycin  Lines: CHANTALE, THEO placed 11/1

## 2020-11-01 NOTE — PROGRESS NOTES
Critical Care Progress Note    Date of admission  10/29/2020    Chief Complaint  54 y.o. male who presented 10/29/2020 with lower extremity infection bilateral that appears to be nec fascitis.  PMH cabg 2019, pafib, heart failure with normal ef, rosanna and copd.  He had an episode of hypoxia this am but now on 2 L and sating adequately. sbp 90-100s.  BNP elevated. CHest xray with edema.  He has been receiving fluids for sepsis. ECHO appears to have reduced ef.  He is alert and oriented, rr mid 20s.  Erythema and swelling to upper thighs bilateral. He is on antibiotics.  Patient currently wants to talk to his brother regarding continued medical therapy and defers ICU transfer currently.  I discussed goals of care and he would like to discuss them with his brother, currently he would rather not be intubated but is not definitive on code status.  He also had hyponatremia to 123.  I have ordered a jenkins catheter and lasix. Lactic acid is normal.  On linezolid, cefepime and flagyl.  Vascular and orthopedics have been consulted and likely will need amputation.  Thrmbocytopenia likely from sepsis.      Hospital Course  10/31 admitted to ICU  10/31 CT legs bilaterally negative for abscesses or air.     Interval Problem Update  Reviewed last 24 hour events  Remains critically ill  On minimal NE gtt this am.   Afebrile  HR in 70-80s  SBP in 90-110s.   On 1L NC, sat >88%  WBC 21K, platelet 100k  Hgb 18.8  Sodium 129, K 4.6  Creatinine 1.93  INR 3.49  I/O with negative 270cc in the past 24 hours, -655cc since admission    Review of Systems  Review of Systems   Constitutional: Negative for fever and malaise/fatigue.   Respiratory: Negative for cough and shortness of breath.    Cardiovascular: Positive for leg swelling. Negative for chest pain.   Gastrointestinal: Negative for abdominal pain, diarrhea, nausea and vomiting.   Genitourinary: Negative for dysuria and urgency.   Musculoskeletal: Negative for back pain.        Leg pain  bilaterally   Skin: Negative for rash.   Neurological: Negative for headaches.   Psychiatric/Behavioral: The patient is not nervous/anxious.    All other systems reviewed and are negative.       Vital Signs for last 24 hours   Temp:  [36.2 °C (97.2 °F)-36.6 °C (97.9 °F)] 36.2 °C (97.2 °F)  Pulse:  [68-83] 80  Resp:  [17-28] 18  BP: ()/(46-63) 97/55  SpO2:  [89 %-96 %] 89 %    Hemodynamic parameters for last 24 hours       Respiratory Information for the last 24 hours       Physical Exam   Physical Exam  Vitals signs and nursing note reviewed.   Constitutional:       General: He is not in acute distress.     Appearance: He is not ill-appearing, toxic-appearing or diaphoretic.   HENT:      Head: Normocephalic and atraumatic.      Mouth/Throat:      Mouth: Mucous membranes are moist.   Neck:      Musculoskeletal: Neck supple.   Cardiovascular:      Rate and Rhythm: Normal rate and regular rhythm.      Pulses: Normal pulses.      Heart sounds: Normal heart sounds. No murmur.   Pulmonary:      Effort: Pulmonary effort is normal. No respiratory distress.      Breath sounds: Normal breath sounds. No wheezing or rales.   Abdominal:      General: Bowel sounds are normal. There is no distension.      Palpations: Abdomen is soft.      Tenderness: There is no abdominal tenderness. There is no guarding.   Musculoskeletal:         General: No swelling.      Comments: Bilateral lower leg extremities - erythema, edematous, tender to touch. Kerlix dressing wrapped around the legs   Skin:     General: Skin is warm and dry.      Capillary Refill: Capillary refill takes less than 2 seconds.      Coloration: Skin is not jaundiced.   Neurological:      General: No focal deficit present.      Mental Status: He is alert.      Cranial Nerves: No cranial nerve deficit.         Medications  Current Facility-Administered Medications   Medication Dose Route Frequency Provider Last Rate Last Admin   • tetanus-dipth-acell pertussis (ADACEL)  inj 0.5 mL  0.5 mL Intramuscular Once Feli Hanson M.D.       • furosemide (LASIX) injection 40 mg  40 mg Intravenous Q DAY Meng Seymour M.D.   Stopped at 11/01/20 0600   • furosemide (LASIX) injection 40 mg  40 mg Intravenous Once Meng Seymour M.D.   Stopped at 10/31/20 1230   • clindamycin (CLEOCIN) IVPB premix 900 mg  900 mg Intravenous Q8HRS Feli Hanson M.D.   Stopped at 11/01/20 0642   • DAPTOmycin 760 mg in NS 50 mL IVPB  6 mg/kg Intravenous Q24HR Feli Hanson M.D. 100 mL/hr at 10/31/20 1613 760 mg at 10/31/20 1613   • norepinephrine (Levophed) infusion 8 mg/250 mL (premix)  0-30 mcg/min Intravenous Continuous Jodi Small M.D. 3.8 mL/hr at 10/31/20 2259 2 mcg/min at 10/31/20 2259   • HYDROmorphone pf (DILAUDID) injection 0.5-1 mg  0.5-1 mg Intravenous Q2HRS PRN Meng Seymour M.D.   1 mg at 11/01/20 0135   • SODIUM CHLORIDE 0.9 % IV SOLN            • albuterol inhaler 2 Puff  2 Puff Inhalation Q6HRS PRN Jayro Vásquez M.D.   2 Puff at 10/31/20 1935   • amiodarone (Cordarone) tablet 200 mg  200 mg Oral QAM Jayro Vásquez M.D.   200 mg at 11/01/20 0542   • ipratropium-albuterol (DUONEB) nebulizer solution  3 mL Nebulization Q6HRS PRN (RT) Jayro Vásquez M.D.       • glycopyrrolate (Seebri) 15.6 mcg inhalation capsule 1 Cap  1 Cap Inhalation BID (RT) Jayro Vásquez M.D.   1 Cap at 10/31/20 1932   • senna-docusate (PERICOLACE or SENOKOT S) 8.6-50 MG per tablet 2 Tab  2 Tab Oral BID Artem Givens M.D.   2 Tab at 11/01/20 0542    And   • polyethylene glycol/lytes (MIRALAX) PACKET 1 Packet  1 Packet Oral QDAY PRN Artem Givens M.D.        And   • magnesium hydroxide (MILK OF MAGNESIA) suspension 30 mL  30 mL Oral QDAY PRN Artem Givens M.D.        And   • bisacodyl (DULCOLAX) suppository 10 mg  10 mg Rectal QDAY PRN Artem Givens M.D.       • Respiratory Therapy Consult   Nebulization Continuous RT Artem Givens M.D.       • cloNIDine (CATAPRES) tablet 0.1 mg  0.1 mg Oral Q6HRS PRN  Artem Givens M.D.       • acetaminophen (TYLENOL) tablet 500 mg  500 mg Oral Q6HRS PRN Artem Givens M.D.       • HYDROcodone/acetaminophen (NORCO)  MG per tablet 1 Tab  1 Tab Oral Q6HRS PRN Artem Givens M.D.   1 Tab at 10/31/20 1928   • insulin regular (HumuLIN R,NovoLIN R) injection  1-6 Units Subcutaneous Q6HRS Artem Givens M.D.   Stopped at 10/30/20 0600    And   • glucose 4 g chewable tablet 16 g  16 g Oral Q15 MIN PRN Artem Givens M.D.        And   • dextrose 50% (D50W) injection 50 mL  50 mL Intravenous Q15 MIN PRN Artem Givens M.D.       • metoprolol (LOPRESSOR) tablet 12.5 mg  12.5 mg Oral TWICE DAILY Kady Barraza M.D.   Stopped at 10/31/20 1800       Fluids    Intake/Output Summary (Last 24 hours) at 11/1/2020 0810  Last data filed at 11/1/2020 0600  Gross per 24 hour   Intake 509.06 ml   Output 1350 ml   Net -840.94 ml       Laboratory  Recent Labs     10/31/20  1411   ISTATAPH 7.320*   ISTATAPCO2 34.4   ISTATAPO2 73   ISTATATCO2 19*   SOQRYHK2YHC 93   ISTATARTHCO3 17.7   ISTATARTBE -7*   ISTATTEMP see below   ISTATFIO2 2   ISTATSPEC Arterial     Recent Labs     10/30/20  0415 11/01/20  0420   CPKTOTAL 381* 156*     Recent Labs     10/30/20  0653 10/30/20  0653 10/31/20  1640 10/31/20  2210 11/01/20  0420   SODIUM 130*   < > 127* 127* 129*   POTASSIUM 4.2   < > 5.2 4.5 4.6   CHLORIDE 97   < > 93* 95* 96   CO2 22   < > 18* 19* 20   BUN 40*   < > 56* 55* 55*   CREATININE 1.48*   < > 2.07* 2.00* 1.93*   MAGNESIUM 1.6  --   --   --  1.9   CALCIUM 8.4*   < > 8.2* 8.3* 8.6    < > = values in this interval not displayed.     Recent Labs     10/29/20  2238 10/30/20  0653 10/30/20  0653 10/31/20  1640 10/31/20  2210 11/01/20  0420   ALTSGPT 41 34  --   --   --   --    ASTSGOT 61* 50*  --   --   --   --    ALKPHOSPHAT 141* 123*  --   --   --   --    TBILIRUBIN 1.7* 1.4  --   --   --   --    GAMMAGT  --  61*  --   --   --   --    GLUCOSE 142* 135*   < > 119* 159* 127*    < > = values in this interval  not displayed.     Recent Labs     10/29/20  2238 10/30/20  0653 10/30/20  0653 10/31/20  0258 10/31/20  0456 11/01/20 0420   WBC 19.7* 22.7*   < > 21.7* 19.8* 21.9*   NEUTSPOLYS 94.80* 91.20*   < > 90.50* 91.60* 96.60*   LYMPHOCYTES 0.80* 0.70*   < > 1.40* 1.20* 0.90*   MONOCYTES 3.20 2.90   < > 3.80 3.90 1.70   EOSINOPHILS 0.20 0.00   < > 0.50 0.40 0.90   BASOPHILS 0.40 0.00   < > 0.50 0.60 0.00   ASTSGOT 61* 50*  --   --   --   --    ALTSGPT 41 34  --   --   --   --    ALKPHOSPHAT 141* 123*  --   --   --   --    TBILIRUBIN 1.7* 1.4  --   --   --   --     < > = values in this interval not displayed.     Recent Labs     10/30/20  0100 10/30/20  0100 10/30/20  1313 10/30/20  1313 10/31/20  0258 10/31/20  0456 11/01/20 0420   RBC  --    < > 6.65*   < > 6.28* 6.30* 6.47*   HEMOGLOBIN  --    < > 19.3*   < > 18.8* 18.6* 18.8*   HEMATOCRIT  --    < > 60.7*   < > 57.5* 57.2* 57.9*   PLATELETCT  --    < > 102*   < > 109* 102* 100*   PROTHROMBTM 43.8*  --  39.7*  --  39.1*  --  36.1*   APTT 68.0*  --   --   --   --   --   --    INR 4.46*  --  3.94*  --  3.86*  --  3.49*    < > = values in this interval not displayed.       Imaging  X-Ray:  I have personally reviewed the images and compared with prior images.  CT:    Reviewed   CT leg : IMPRESSION:     1.  No discrete fluid collection is identified to suggest abscess.     2.  Diffuse subcutaneous edema and overlying skin thickening with venous varicosities.     3.  Atherosclerosis.     4.  Diffuse muscle atrophy.  Assessment/Plan  * Cellulitis  Assessment & Plan  Severe bilateral leg cellulitis. Was concern about possible necrotizing fascitis  CT legs bilaterally did not show any evidence of fluid collection/abscess or soft tissue gas.   His erythema not appear worsening compared to yesterday.   Pt is on clindamycin and daptomycin  Need to reduce his leg swelling as this is not helping.   Lasix 80 BID.   Pt is refusing surgery at this time. Will manage conservatively   I  had a very long discussion with him and daughter about his critical condition. I explained about his severe cellulitis that could potentially get worsened if not treated appropriately. All questions were answered. I also explained about our limited visitation hours due to COVID pandemic. Family didn't seem to support our visitation hours. I offered my apology. I did my best to explain to them about his critical condition and visitation hours. His daughter seems to understand the situation    Sepsis ([facility])  Assessment & Plan  Sepsis due to lower ext infection Likely nec fasc  Ortho and vascular consulted, likely will need amputation at some point  Concern of worsening cellulitis - erythema appears about the same compared to yesterday   Pressors to support MAP >65 and SBP >90  Diuresis as tolerated  Antibiotics, ID consulted  Follow up cultures    High anion gap metabolic acidosis  Assessment & Plan  Likely from poor perfusion    Supratherapeutic INR- (present on admission)  Assessment & Plan  Likely from sepsis    Paroxysmal atrial fibrillation ([facility])- (present on admission)  Assessment & Plan  Monitor closely  On amiodarone and metoprolol    LINSEY (acute kidney injury) ([facility])  Assessment & Plan  Acute kidney injury 2/2 likely ATN due to sepsis  Lasix 80 BID, goal negative fluid balance  Monitor UOP closely, strict I/Os      Elevated LFTs  Assessment & Plan  Likely from poor perfusion    Polycythemia  Assessment & Plan  Daily cbc    Elevated troponin- (present on admission)  Assessment & Plan  Likely type 2 MI from poor perfusion from sepsis, heart failure    Heart failure with preserved ejection fraction, borderline, class IV ([facility])- (present on admission)  Assessment & Plan  Prior hx, current echo appears reduced ef  Diuresis as tolerated  On metoprolol      Type 2 diabetes mellitus with complication, without long-term current use of insulin ([facility])- (present on admission)  Assessment & Plan  ssi    COPD (chronic  obstructive pulmonary disease) (HCC)  Assessment & Plan  Not in exacerbation  duonebs prn  Albuterol prn  On seebri bid    Hyponatremia  Assessment & Plan  Has been receiving fluids  Trial lasix, lactic acid normal  Reduced ef on my review of echo  q 4 hour sodium  Us in ICU for better fluid assessment  IVC dilated on formal echo       VTE:  Heparin  Ulcer: Not Indicated  Lines: Central Line  Ongoing indication addressed and Ceballos Catheter  Ongoing indication addressed    I have performed a physical exam and reviewed and updated ROS and Plan today (11/1/2020). In review of yesterday's note (10/31/2020), there are no changes except as documented above.     Discussed patient condition and risk of morbidity and/or mortality with Family, RN, RT, Pharmacy, Charge nurse / hot rounds and Patient  The patient remains critically ill.  Critical care time = 85 minutes in directly providing and coordinating critical care and extensive data review.  No time overlap and excludes procedures.

## 2020-11-01 NOTE — CARE PLAN
Problem: Pain Management  Goal: Pain level will decrease to patient's comfort goal  Outcome: PROGRESSING AS EXPECTED  Intervention: Follow pain managment plan developed in collaboration with patient and Interdisciplinary Team  Note: PRNs given overnight per 0-10 scale.      Problem: Urinary Elimination:  Goal: Ability to reestablish a normal urinary elimination pattern will improve  Outcome: PROGRESSING AS EXPECTED  Intervention: Evaluate need to continue indwelling urinary catheter  Note: Good urine output via jenkins. Clear, manisha.

## 2020-11-01 NOTE — PROGRESS NOTES
Ortho Prog Note    BLE wounds, adamantly against surgery    Rec continued wound care  Appreciate ID and LPS input    Smith Arriaga MD

## 2020-11-01 NOTE — PROGRESS NOTES
1000: Notified Dr. Cole that patient does not want to go into surgery as of today. MD gave verbal okay to resume pts diet.    1107: Patient stated he does not want to have surgery today and Dr. Cole has been made aware. Pts daughter was supposed to visit patient before surgery but since this is no longer happening, I attempted to call pts daughter Hollie to let her know that she would no longer be allowed to visit. I was unable to reach her but I left a message on her voicemail. I also called patients wife to inform her of this information, but she did not answer.  1110: Pts wife returned my missed phone call and I updated her on her daughter not being able to visit the patient since he does not want to have surgery. She understood this and stated she will be in to see the patient later this afternoon.     1310: Spoke with Supervisor Jona and Charge RN Boris in regards to patient having his daughter visit today. This visitation has been approved by both parties and Dr. Cole. I called the patients daughter Hollie to inform her that she will be able to visit her father today at 4PM for 1 hour as a sole visitor. I made her aware this will be a one time visitation for the rest of his stay. She agreed to these terms and will be here at 4PM today.     1315: Called pts wife Lisa to let her know of visitation plan and pts daughter Hollie answered the phone. She told me that my previous conversation with her was on speaker phone and her mom heard the conversation and also agrees to these stipulations.

## 2020-11-01 NOTE — PROGRESS NOTES
Progress Note    CC: f/u for BLE wound infection, sepsis    Interval History: 54 y.o. male w/ CAD s/p CABG, morbid obesity, active smoking, PAD and longstanding hx of wounds to both legs. Presented 10/29/2020 with sepsis d/t infected BLE wounds. Transferred to ICU given multiple comorbidities and resuscitation needs. Pt indicated yesterday that he does not want an amputation and tells me today that he is reluctant to proceed w/ any surgery until he speaks with multiple family members.     Review of Systems  Negative for chest pain or SOB  Negative for stroke/TIA    Medications  Prior to Admission Medications   Prescriptions Last Dose Informant Patient Reported? Taking?   acetaminophen (TYLENOL) 325 MG Tab prn at prn  No No   Sig: Take 2 Tabs by mouth every 6 hours as needed (Mild Pain; (Pain scale 1-3); Temp greater than 100.5 F).   albuterol 108 (90 Base) MCG/ACT Aero Soln inhalation aerosol prn at prn Patient Yes No   Sig: Inhale 2 Puffs by mouth every 6 hours as needed for Shortness of Breath.   amiodarone (CORDARONE) 200 MG Tab 10/29/2020 at am  No No   Sig: Take 1 Tab by mouth every morning.   ferrous sulfate 325 (65 Fe) MG tablet 10/29/2020 at am Patient Yes No   Sig: Take 325 mg by mouth every morning.   furosemide (LASIX) 40 MG Tab 10/29/2020 at am  No No   Sig: Take 1 Tab by mouth every morning.   ipratropium-albuterol (DUONEB) 0.5-2.5 (3) MG/3ML nebulizer solution prn at prn Patient No No   Sig: 3 mL by Nebulization route every 6 hours as needed for Shortness of Breath.   lisinopril (PRINIVIL) 2.5 MG Tab 10/29/2020 at am  No No   Sig: Take 1 Tab by mouth every morning.   metoprolol SR (TOPROL XL) 25 MG TABLET SR 24 HR 10/29/2020 at am  No No   Sig: Take 1 Tab by mouth every morning.   polyethylene glycol/lytes (MIRALAX) Pack 10/29/2020 at am Patient Yes No   Sig: Take 17 g by mouth every morning.   potassium chloride SA (K-DUR) 10 MEQ Tab CR 10/29/2020 at am  No No   Sig: Take 1 Tab by mouth every  morning.   rosuvastatin (CRESTOR) 40 MG tablet 10/29/2020 at am  No No   Sig: Take 1 Tab by mouth every morning.   spironolactone (ALDACTONE) 25 MG Tab 10/29/2020 at am  No No   Sig: Take 1 Tab by mouth every morning.   tiotropium (SPIRIVA) 18 MCG Cap 10/29/2020 at am Patient Yes No   Sig: Inhale 18 mcg by mouth every morning.   warfarin (COUMADIN) 5 MG Tab 10/29/2020 at unk  No No   Sig: TAKE 1 & 1/2 (ONE & ONE-HALF) TABLETS BY MOUTH ONCE DAILY OR  AS  DIRECTED  BY  COUMADIN  CLINIC      Facility-Administered Medications: None         Physical Exam  Vitals:    11/01/20 0834   BP:    Pulse: 83   Resp: 20   Temp:    SpO2: 91%       Pulse/Extremity Exam:    Pedal Pulses:       Right DP monophasic       Right PT monophasic       Left DP monophasic       Left PT absent    Wounds: BLE diffusely edematous and violaceous, R>L. Partial thickness eschar over LLE, soughing/weeping. Cellulitis to upper thigh on the left. Partial-thickness wounds over dorsum of R ankle, weeping. No crepitus or ballotable collection.      General appearance: NAD, conversing appropriately  Psych: Normal affect, mood, judgement  Neuro: CN II-XII grossly intact.   Neck: full range of motion  Lungs: No inspiratory stridor or wheezing  CV: RRR  Abdomen: Soft, NT/ND  Skin: No rashes      Labs reviewed today:  Recent Labs     10/31/20  0258 10/31/20  0456 11/01/20  0420   WBC 21.7* 19.8* 21.9*   RBC 6.28* 6.30* 6.47*   HEMOGLOBIN 18.8* 18.6* 18.8*   HEMATOCRIT 57.5* 57.2* 57.9*   MCV 91.6 90.8 89.5   MCH 29.9 29.5 29.1   MCHC 32.7* 32.5* 32.5*   RDW 62.5* 61.4* 60.2*   PLATELETCT 109* 102* 100*   MPV 11.8 11.0 11.2     Recent Labs     10/31/20  1640 10/31/20  2210 11/01/20  0420   SODIUM 127* 127* 129*   POTASSIUM 5.2 4.5 4.6   CHLORIDE 93* 95* 96   CO2 18* 19* 20   GLUCOSE 119* 159* 127*   BUN 56* 55* 55*   CREATININE 2.07* 2.00* 1.93*   CALCIUM 8.2* 8.3* 8.6     Recent Labs     10/29/20  2238 10/30/20  0653 10/30/20  0653 10/31/20  1640  10/31/20  2210 20  0420   ALTSGPT 41 34  --   --   --   --    ASTSGOT 61* 50*  --   --   --   --    ALKPHOSPHAT 141* 123*  --   --   --   --    TBILIRUBIN 1.7* 1.4  --   --   --   --    GAMMAGT  --  61*  --   --   --   --    GLUCOSE 142* 135*   < > 119* 159* 127*    < > = values in this interval not displayed.     Recent Labs     10/30/20  0100 10/30/20  1313 10/31/20  0258 20  0420   APTT 68.0*  --   --   --    INR 4.46* 3.94* 3.86* 3.49*             No results for input(s): TROPONINI in the last 72 hours.    Urinalysis:    Recent Labs     10/30/20  0634   SPECGRAVITY 1.027   GLUCOSEUR Negative   KETONES Negative   NITRITE Negative   LEUKESTERAS Trace*   WBCURINE 5-10*   RBCURINE 0-2*   BACTERIA Negative   EPITHELCELL Few        Imaging & Data Review:  I personally reviewed all non-invasive vascular testing including images, x-rays, tracings, arterial waveforms, and duplex exams relevant to this admission. My interpretation is below:       Arterial Duplex: Limited eval of tibials d/t edema. Diffuse bilateral disease w/ multiphasic waveforms throughout on the right. Monophasic throughout the LLE.     Venous Duplex: No DVT/SVT in either leg    10/31 CT legs: Diffuse edema bilaterally w/ no localized fluid collection or gas.    Assessment/Plan & Medical Decision-MakinM admitted w/ chronic infected BLE wounds, severe cellulitis, sepsis. Cellulitis appears severe, but stable.    No acute vascular intervention planned at this point. Has signal in the bilateral pops and in the feet. Source control is primary concern. Would defer to ortho for any necessary debridement. I did explain that if this infection progresses, it could become life-threatening. He understands and remains opposed to any surgery at this time.    Thank you for involving us in this patient's care. Please call with questions.      Teo Johnson MD  Vascular Surgeon  [facility]  Office: [phone]

## 2020-11-02 ENCOUNTER — APPOINTMENT (OUTPATIENT)
Dept: RADIOLOGY | Facility: MEDICAL CENTER | Age: 55
DRG: 853 | End: 2020-11-02
Attending: INTERNAL MEDICINE
Payer: MEDICARE

## 2020-11-02 ENCOUNTER — APPOINTMENT (OUTPATIENT)
Dept: CARDIOLOGY | Facility: MEDICAL CENTER | Age: 55
DRG: 853 | End: 2020-11-02
Attending: INTERNAL MEDICINE
Payer: MEDICARE

## 2020-11-02 LAB
ABO GROUP BLD: ABNORMAL
ANION GAP SERPL CALC-SCNC: 11 MMOL/L (ref 7–16)
BASOPHILS # BLD AUTO: 0 % (ref 0–1.8)
BASOPHILS # BLD: 0 K/UL (ref 0–0.12)
BLD GP AB INVEST PLASRBC-IMP: ABNORMAL
BLD GP AB SCN SERPL QL: ABNORMAL
BUN SERPL-MCNC: 47 MG/DL (ref 8–22)
CALCIUM SERPL-MCNC: 7.9 MG/DL (ref 8.5–10.5)
CHLORIDE SERPL-SCNC: 95 MMOL/L (ref 96–112)
CO2 SERPL-SCNC: 24 MMOL/L (ref 20–33)
CREAT SERPL-MCNC: 1.55 MG/DL (ref 0.5–1.4)
DAT C3D-SP REAG RBC QL: ABNORMAL
DAT IGG-SP REAG RBC QL: ABNORMAL
EOSINOPHIL # BLD AUTO: 0 K/UL (ref 0–0.51)
EOSINOPHIL NFR BLD: 0 % (ref 0–6.9)
ERYTHROCYTE [DISTWIDTH] IN BLOOD BY AUTOMATED COUNT: 59.7 FL (ref 35.9–50)
GLUCOSE BLD-MCNC: 168 MG/DL (ref 65–99)
GLUCOSE BLD-MCNC: 171 MG/DL (ref 65–99)
GLUCOSE SERPL-MCNC: 185 MG/DL (ref 65–99)
HCT VFR BLD AUTO: 57.1 % (ref 42–52)
HGB BLD-MCNC: 18.7 G/DL (ref 14–18)
INR PPP: 3.21 (ref 0.87–1.13)
LACTATE BLD-SCNC: 1.9 MMOL/L (ref 0.5–2)
LYMPHOCYTES # BLD AUTO: 0.42 K/UL (ref 1–4.8)
LYMPHOCYTES NFR BLD: 1.7 % (ref 22–41)
MAGNESIUM SERPL-MCNC: 1.7 MG/DL (ref 1.5–2.5)
MANUAL DIFF BLD: NORMAL
MCH RBC QN AUTO: 29.1 PG (ref 27–33)
MCHC RBC AUTO-ENTMCNC: 32.7 G/DL (ref 33.7–35.3)
MCV RBC AUTO: 88.9 FL (ref 81.4–97.8)
METAMYELOCYTES NFR BLD MANUAL: 0.8 %
MONOCYTES # BLD AUTO: 0.42 K/UL (ref 0–0.85)
MONOCYTES NFR BLD AUTO: 1.7 % (ref 0–13.4)
MORPHOLOGY BLD-IMP: NORMAL
NEUTROPHILS # BLD AUTO: 23.76 K/UL (ref 1.82–7.42)
NEUTROPHILS NFR BLD: 95 % (ref 44–72)
NEUTS BAND NFR BLD MANUAL: 0.8 % (ref 0–10)
NRBC # BLD AUTO: 0 K/UL
NRBC BLD-RTO: 0 /100 WBC
PLATELET # BLD AUTO: 156 K/UL (ref 164–446)
PMV BLD AUTO: 11.1 FL (ref 9–12.9)
POTASSIUM SERPL-SCNC: 4.1 MMOL/L (ref 3.6–5.5)
PROTHROMBIN TIME: 33.8 SEC (ref 12–14.6)
RBC # BLD AUTO: 6.42 M/UL (ref 4.7–6.1)
RH BLD: ABNORMAL
SODIUM SERPL-SCNC: 130 MMOL/L (ref 135–145)
WBC # BLD AUTO: 24.8 K/UL (ref 4.8–10.8)

## 2020-11-02 PROCEDURE — 86870 RBC ANTIBODY IDENTIFICATION: CPT

## 2020-11-02 PROCEDURE — 700101 HCHG RX REV CODE 250: Performed by: INTERNAL MEDICINE

## 2020-11-02 PROCEDURE — 700111 HCHG RX REV CODE 636 W/ 250 OVERRIDE (IP): Performed by: INTERNAL MEDICINE

## 2020-11-02 PROCEDURE — 700102 HCHG RX REV CODE 250 W/ 637 OVERRIDE(OP): Performed by: STUDENT IN AN ORGANIZED HEALTH CARE EDUCATION/TRAINING PROGRAM

## 2020-11-02 PROCEDURE — 770022 HCHG ROOM/CARE - ICU (200)

## 2020-11-02 PROCEDURE — 700111 HCHG RX REV CODE 636 W/ 250 OVERRIDE (IP): Performed by: STUDENT IN AN ORGANIZED HEALTH CARE EDUCATION/TRAINING PROGRAM

## 2020-11-02 PROCEDURE — 99233 SBSQ HOSP IP/OBS HIGH 50: CPT | Performed by: INTERNAL MEDICINE

## 2020-11-02 PROCEDURE — 86901 BLOOD TYPING SEROLOGIC RH(D): CPT

## 2020-11-02 PROCEDURE — 86850 RBC ANTIBODY SCREEN: CPT

## 2020-11-02 PROCEDURE — 83605 ASSAY OF LACTIC ACID: CPT

## 2020-11-02 PROCEDURE — 700102 HCHG RX REV CODE 250 W/ 637 OVERRIDE(OP): Performed by: INTERNAL MEDICINE

## 2020-11-02 PROCEDURE — 86880 COOMBS TEST DIRECT: CPT

## 2020-11-02 PROCEDURE — 700105 HCHG RX REV CODE 258: Performed by: INTERNAL MEDICINE

## 2020-11-02 PROCEDURE — 80048 BASIC METABOLIC PNL TOTAL CA: CPT

## 2020-11-02 PROCEDURE — 93308 TTE F-UP OR LMTD: CPT

## 2020-11-02 PROCEDURE — 82962 GLUCOSE BLOOD TEST: CPT

## 2020-11-02 PROCEDURE — 99232 SBSQ HOSP IP/OBS MODERATE 35: CPT | Mod: GC | Performed by: STUDENT IN AN ORGANIZED HEALTH CARE EDUCATION/TRAINING PROGRAM

## 2020-11-02 PROCEDURE — 85610 PROTHROMBIN TIME: CPT

## 2020-11-02 PROCEDURE — 700117 HCHG RX CONTRAST REV CODE 255: Performed by: INTERNAL MEDICINE

## 2020-11-02 PROCEDURE — A9270 NON-COVERED ITEM OR SERVICE: HCPCS | Performed by: STUDENT IN AN ORGANIZED HEALTH CARE EDUCATION/TRAINING PROGRAM

## 2020-11-02 PROCEDURE — 85027 COMPLETE CBC AUTOMATED: CPT

## 2020-11-02 PROCEDURE — 85007 BL SMEAR W/DIFF WBC COUNT: CPT

## 2020-11-02 PROCEDURE — 83735 ASSAY OF MAGNESIUM: CPT

## 2020-11-02 PROCEDURE — 99291 CRITICAL CARE FIRST HOUR: CPT | Performed by: INTERNAL MEDICINE

## 2020-11-02 PROCEDURE — A9270 NON-COVERED ITEM OR SERVICE: HCPCS | Performed by: INTERNAL MEDICINE

## 2020-11-02 PROCEDURE — 94640 AIRWAY INHALATION TREATMENT: CPT

## 2020-11-02 RX ORDER — CEFAZOLIN SODIUM 2 G/100ML
2 INJECTION, SOLUTION INTRAVENOUS EVERY 8 HOURS
Status: COMPLETED | OUTPATIENT
Start: 2020-11-02 | End: 2020-11-16

## 2020-11-02 RX ORDER — MAGNESIUM SULFATE HEPTAHYDRATE 40 MG/ML
2 INJECTION, SOLUTION INTRAVENOUS ONCE
Status: COMPLETED | OUTPATIENT
Start: 2020-11-02 | End: 2020-11-02

## 2020-11-02 RX ORDER — LINEZOLID 600 MG/1
600 TABLET, FILM COATED ORAL EVERY 12 HOURS
Status: DISCONTINUED | OUTPATIENT
Start: 2020-11-02 | End: 2020-11-07

## 2020-11-02 RX ADMIN — DAPTOMYCIN 1000 MG: 350 INJECTION, POWDER, LYOPHILIZED, FOR SOLUTION INTRAVENOUS at 13:41

## 2020-11-02 RX ADMIN — GLYCOPYRROLATE 1 CAPSULE: 15.6 CAPSULE RESPIRATORY (INHALATION) at 20:27

## 2020-11-02 RX ADMIN — HYDROMORPHONE HYDROCHLORIDE 1 MG: 1 INJECTION, SOLUTION INTRAMUSCULAR; INTRAVENOUS; SUBCUTANEOUS at 03:38

## 2020-11-02 RX ADMIN — HYDROCODONE BITARTRATE AND ACETAMINOPHEN 1 TABLET: 10; 325 TABLET ORAL at 16:28

## 2020-11-02 RX ADMIN — FUROSEMIDE 80 MG: 10 INJECTION, SOLUTION INTRAMUSCULAR; INTRAVENOUS at 16:28

## 2020-11-02 RX ADMIN — INSULIN HUMAN 1 UNITS: 100 INJECTION, SOLUTION PARENTERAL at 23:53

## 2020-11-02 RX ADMIN — AMIODARONE HYDROCHLORIDE 200 MG: 200 TABLET ORAL at 06:18

## 2020-11-02 RX ADMIN — CLINDAMYCIN IN 5 PERCENT DEXTROSE 900 MG: 18 INJECTION, SOLUTION INTRAVENOUS at 06:18

## 2020-11-02 RX ADMIN — DOCUSATE SODIUM 50 MG AND SENNOSIDES 8.6 MG 2 TABLET: 8.6; 5 TABLET, FILM COATED ORAL at 16:28

## 2020-11-02 RX ADMIN — CEFAZOLIN SODIUM 2 G: 2 INJECTION, SOLUTION INTRAVENOUS at 16:28

## 2020-11-02 RX ADMIN — INSULIN HUMAN 2 UNITS: 100 INJECTION, SOLUTION PARENTERAL at 11:55

## 2020-11-02 RX ADMIN — FUROSEMIDE 80 MG: 10 INJECTION, SOLUTION INTRAMUSCULAR; INTRAVENOUS at 06:18

## 2020-11-02 RX ADMIN — MAGNESIUM SULFATE 2 G: 2 INJECTION INTRAVENOUS at 07:15

## 2020-11-02 RX ADMIN — LINEZOLID 600 MG: 600 TABLET, FILM COATED ORAL at 16:29

## 2020-11-02 RX ADMIN — HUMAN ALBUMIN MICROSPHERES AND PERFLUTREN 3 ML: 10; .22 INJECTION, SOLUTION INTRAVENOUS at 11:06

## 2020-11-02 RX ADMIN — CLINDAMYCIN IN 5 PERCENT DEXTROSE 900 MG: 18 INJECTION, SOLUTION INTRAVENOUS at 13:41

## 2020-11-02 RX ADMIN — DOCUSATE SODIUM 50 MG AND SENNOSIDES 8.6 MG 2 TABLET: 8.6; 5 TABLET, FILM COATED ORAL at 06:18

## 2020-11-02 RX ADMIN — INSULIN HUMAN 1 UNITS: 100 INJECTION, SOLUTION PARENTERAL at 17:03

## 2020-11-02 RX ADMIN — HYDROMORPHONE HYDROCHLORIDE 1 MG: 1 INJECTION, SOLUTION INTRAMUSCULAR; INTRAVENOUS; SUBCUTANEOUS at 13:41

## 2020-11-02 RX ADMIN — CEFAZOLIN SODIUM 2 G: 2 INJECTION, SOLUTION INTRAVENOUS at 22:42

## 2020-11-02 ASSESSMENT — ENCOUNTER SYMPTOMS
MYALGIAS: 1
CHILLS: 0
PALPITATIONS: 0
SPUTUM PRODUCTION: 0
BLURRED VISION: 0
SORE THROAT: 0
VOMITING: 0
WEAKNESS: 1
SHORTNESS OF BREATH: 0
COUGH: 0
NAUSEA: 0
DEPRESSION: 0
BACK PAIN: 0
PHOTOPHOBIA: 0
FOCAL WEAKNESS: 0
NERVOUS/ANXIOUS: 0
HEADACHES: 0
DIZZINESS: 0
ABDOMINAL PAIN: 0
NERVOUS/ANXIOUS: 1
FEVER: 0
DIARRHEA: 0
CONSTIPATION: 0

## 2020-11-02 ASSESSMENT — FIBROSIS 4 INDEX: FIB4 SCORE: 3.08

## 2020-11-02 ASSESSMENT — PAIN DESCRIPTION - PAIN TYPE
TYPE: CHRONIC PAIN
TYPE: ACUTE PAIN
TYPE: CHRONIC PAIN
TYPE: CHRONIC PAIN

## 2020-11-02 NOTE — PROGRESS NOTES
Infectious Disease Progress Note    Author: Carol Barber M.D. Date & Time of service: 2020  12:16 PM    Chief Complaint:  Lower extremity cellulitis and necrotic wounds       Interval History:  54-year-old male with known coronary artery disease, atrial fibrillation on chronic anticoagulation, diabetes, who was admitted on 10/29/2020 due to worsening bilateral lower extremity pain, swelling, erythema and necrotic wounds  10/31 AF WBC 22 increased O2 but states less SOB Remains with tender and swollen BLE left greater than right-sloughing eschars   AF WBC 21.9 transferred to ICU-down to nasal cannula- BLE edema improved somewhat. Denies SE abx-Vascular note-patient declining surgery     Review of Systems:  Review of Systems   Constitutional: Negative for chills and fever.   Gastrointestinal: Negative for abdominal pain, constipation, diarrhea, nausea and vomiting.   Musculoskeletal: Positive for joint pain and myalgias.       Hemodynamics:  Temp (24hrs), Av.8 °C (98.2 °F), Min:36.6 °C (97.9 °F), Max:37.3 °C (99.1 °F)  Temperature: 37.3 °C (99.1 °F)  Pulse  Av.1  Min: 65  Max: 103   Blood Pressure: 115/64       Physical Exam:  Physical Exam  Constitutional:       Appearance: Normal appearance. He is obese. He is ill-appearing.   Cardiovascular:      Rate and Rhythm: Normal rate and regular rhythm.      Heart sounds: Normal heart sounds.   Pulmonary:      Effort: Pulmonary effort is normal.      Breath sounds: Normal breath sounds.   Abdominal:      General: Abdomen is flat. Bowel sounds are normal.      Palpations: Abdomen is soft.   Musculoskeletal:         General: Swelling and tenderness present.      Right lower leg: Edema present.      Left lower leg: Edema present.      Comments: Right lower leg with edema, weeping wounds, erythema and severe tenderness to palpation.  Left leg with edema, weeping wounds, erythema of the entire leg, warmth and tenderness particular around the knee area.     Skin:     General: Skin is warm and dry.      Findings: Rash present.      Comments: Faint erythematous macular rash on lower abdomen   Neurological:      General: No focal deficit present.      Mental Status: He is alert and oriented to person, place, and time.   Psychiatric:         Mood and Affect: Mood normal.         Meds:    Current Facility-Administered Medications:   •  DAPTOmycin  •  furosemide  •  clindamycin (CLEOCIN) IV  •  norepinephrine (Levophed) infusion  •  HYDROmorphone  •  albuterol  •  amiodarone  •  ipratropium-albuterol  •  glycopyrrolate  •  senna-docusate **AND** polyethylene glycol/lytes **AND** magnesium hydroxide **AND** bisacodyl  •  Respiratory Therapy Consult  •  cloNIDine  •  acetaminophen  •  HYDROcodone/acetaminophen  •  insulin regular **AND** POC Blood Glucose **AND** NOTIFY MD and PharmD **AND** glucose **AND** dextrose 50%    Labs:  Recent Labs     10/31/20  0456 11/01/20  0420 11/02/20  0345   WBC 19.8* 21.9* 24.8*   RBC 6.30* 6.47* 6.42*   HEMOGLOBIN 18.6* 18.8* 18.7*   HEMATOCRIT 57.2* 57.9* 57.1*   MCV 90.8 89.5 88.9   MCH 29.5 29.1 29.1   RDW 61.4* 60.2* 59.7*   PLATELETCT 102* 100* 156*   MPV 11.0 11.2 11.1   NEUTSPOLYS 91.60* 96.60* 95.00*   LYMPHOCYTES 1.20* 0.90* 1.70*   MONOCYTES 3.90 1.70 1.70   EOSINOPHILS 0.40 0.90 0.00   BASOPHILS 0.60 0.00 0.00   RBCMORPHOLO  --  Present  --      Recent Labs     10/31/20  2210 11/01/20  0420 11/02/20  0345   SODIUM 127* 129* 130*   POTASSIUM 4.5 4.6 4.1   CHLORIDE 95* 96 95*   CO2 19* 20 24   GLUCOSE 159* 127* 185*   BUN 55* 55* 47*   CPKTOTAL  --  156*  --      Recent Labs     10/31/20  2210 11/01/20  0420 11/02/20  0345   CREATININE 2.00* 1.93* 1.55*       Imaging:  Ct-extremity, Lower W/o Right    Result Date: 10/31/2020  10/31/2020 3:13 PM HISTORY/REASON FOR EXAM:  Lower leg swelling/redness, cellulitis suspected. TECHNIQUE/EXAM DESCRIPTION AND NUMBER OF VIEWS: CT scan of the RIGHT lower extremity without contrast and  including reconstructions. Thin-section noncontrast helical images were obtained. Coronal and sagittal reconstructions were generated from the axial images. Up to date radiation dose reduction adjustments have been utilized to meet ALARA standards for radiation dose reduction. COMPARISON: X-ray tibia and fibula 10/29/2020 FINDINGS: There is joint space narrowing with sclerosis and osteophyte formation in the lateral compartment of the knee. No definite bony destruction is identified. There is marked edema in the subcutaneous tissues of the right lower leg with slight thickening of the overlying skin. There are varicosities. There is arterial calcification. No well-defined fluid collection is identified to suggest abscess. There is diffuse muscle atrophy.     1.  No discrete fluid collection is identified to suggest abscess. 2.  Diffuse subcutaneous edema and overlying skin thickening with venous varicosities. 3.  Atherosclerosis. 4.  Diffuse muscle atrophy.    Ct-extremity, Lower W/o Left    Result Date: 10/31/2020  10/31/2020 2:58 PM HISTORY/REASON FOR EXAM:  Lower leg swelling/redness, cellulitis suspected; Bilateral. TECHNIQUE/EXAM DESCRIPTION AND NUMBER OF VIEWS: CT scan of the LEFT lower extremity without contrast and including reconstructions. Thin-section noncontrast helical images were obtained. Coronal and sagittal reconstructions were generated from the axial images. Up to date radiation dose reduction adjustments have been utilized to meet ALARA standards for radiation dose reduction. COMPARISON: None. FINDINGS: Distal femur appears normal There is osteoarthritis of the left knee joint. Tibia and fibula are intact. There is no soft tissue gas. There is extensive atherosclerotic plaque There is diffuse muscular atrophy There is cutaneous and subcutaneous edema consistent with cellulitis. There are no discrete fluid collection. There is osteoarthritis of the midfoot.     1.  Left leg cellulitis without  abscess or soft tissue gas 2.  Small vessel atherosclerotic plaque 3.  osteoarthritis of the left knee and left midfoot 4.  Muscular atrophy    Dx-chest-limited (1 View)    Result Date: 11/1/2020 11/1/2020 4:42 PM HISTORY/REASON FOR EXAM:  CVL placement. Central line placement TECHNIQUE/EXAM DESCRIPTION AND NUMBER OF VIEWS: Single AP view of the chest. COMPARISON:  1 view chest 10/31/2020 FINDINGS: Right internal jugular catheter has been placed. The tip projects appropriately over the superior vena cava. LUNGS: There are pulmonary interstitial and alveolar densities that are consistent with edema. HEART and MEDIASTINUM: enlarged. There has been previous sternotomy. Pleura: There are no pleural effusion or pneumothoraces. Osseous structures: No significant bony abnormality.     1.  Right internal jugular catheter is been placed and appears appropriately located 2.  Moderate pulmonary edema 3.  Enlarged cardiac silhouette    Dx-chest-portable (1 View)    Result Date: 10/31/2020    10/31/2020 6:58 AM HISTORY/REASON FOR EXAM: Pleural Effusion TECHNIQUE/EXAM DESCRIPTION:  Single AP view of the chest. COMPARISON: October 29, 2020 FINDINGS: Overlying cardiac leads are present. Cardiomegaly is observed.  Postsurgical changes of sternotomy are noted. The mediastinal contour appears within normal limits.  The central  pulmonary vasculature appears prominent and indistinct. The lungs appear well expanded bilaterally.  Diffuse scattered hazy pulmonary parenchymal opacities are seen. No significant pleural effusions are identified. The bony structures appear age-appropriate.     1.  Pulmonary edema and/or infiltrates are identified, which are stable since the prior exam. 2.  Cardiomegaly    Dx-chest-portable (1 View)    Result Date: 10/30/2020    10/29/2020 11:32 PM HISTORY/REASON FOR EXAM: Shortness of Breath TECHNIQUE/EXAM DESCRIPTION:  Single AP view of the chest. COMPARISON: September 10, 2019 FINDINGS: Overlying cardiac  leads are present. Cardiomegaly is observed.  Postsurgical changes of sternotomy are noted. The mediastinal contour appears within normal limits.  The central  pulmonary vasculature appears prominent and indistinct. The lungs appear well expanded bilaterally.  Hazy interstitial bilateral pulmonary opacities are seen. No significant pleural effusions are identified. The bony structures appear age-appropriate.     1.  Interstitial pulmonary parenchymal prominence, compatible with interstitial edema and/or infiltrates. 2.  Cardiomegaly    Dx-tibia And Fibula Left    Addendum Date: 10/30/2020    Addendum: Subtle clustered punctate lucencies lateral to the ankle are seen which could represent tiny foci of soft tissue gas. These findings were discussed with the patient's clinician, ELIZABETH HILARIO, on 10/30/2020 12:34 AM.    Result Date: 10/30/2020  10/29/2020 11:32 PM HISTORY/REASON FOR EXAM: Atraumatic Pain/Swelling/Deformity TECHNIQUE/EXAM DESCRIPTION:  AP and lateral views of the LEFT tibia and fibula. COMPARISON:  None FINDINGS: Tricompartmental degenerative changes of the knee are seen. Bony structures and articulations appear within normal limits without visualized fracture, subluxation, or dislocation. Atherosclerotic changes are seen. Soft tissue phleboliths are seen. Soft tissue edema is noted.     1.  No acute traumatic bony injury. 2.  Tricompartmental degenerative changes of the knee. 3.  Atherosclerosis    Dx-tibia And Fibula Right    Result Date: 10/30/2020  10/29/2020 11:32 PM HISTORY/REASON FOR EXAM: Atraumatic Pain/Swelling/Deformity TECHNIQUE/EXAM DESCRIPTION:  AP and lateral views of the RIGHT tibia and fibula. COMPARISON:  None FINDINGS: Tricompartmental degenerative changes of the knee are seen, otherwise the bony structures and articulations appear within normal limits without visualized fracture, subluxation, or dislocation. Atherosclerotic changes are seen. Scattered soft tissue level associated.         1.  No acute traumatic bony injury. 2.  Tricompartmental degenerative changes of the knee 3.  Atherosclerosis    Us-extremity Artery Lower Bilat    Result Date: 10/30/2020  Lower Extremity  Arterial Duplex Report  Vascular Laboratory  CONCLUSIONS  1) Bilateral atherosclerosis  2) Right distal SFA 50-75% stenosis.  2) Monophasic waveforms in the left lower extremity  JARRETT WHITE  Exam Date:     10/30/2020 01:09  Room #:     Inpatient  Priority:     Stat  Ht (in):             Wt (lb):  Ordering Physician:        ELIZABETH HILARIO  Referring Physician:       ELIZABETH HILARIO  Sonographer:               Silvia Lasstier RVT                             [facility]  Study Type:                Complete Bilateral  Technical Quality:         Adequate  Age:    54    Gender:     M  MRN:    [id number]  :    1965      BSA:  Indications:     Ulceration of LE  CPT Codes:       99035  ICD Codes:       707.1  History:         Nonhealing, raw and bleeding wounds bilaterally. Blue                   discoloration of limbs. Severe pain bilaterally.  Limitations:     Pain.                RIGHT  Waveform        Peak Systolic Velocity (cm/s)                  Prox    Prox-Mid  Mid    Mid-Dist  Distal  Triphasic                         107                      CFA  Triphasic       110                                        PFA  Triphasic       96                89      214      79      SFA  Biphasic                          44                       POP  Biphasic                                           49      AT  Not                                                        PT  attained  Not                                                        BRITTON  attained                LEFT  Waveform        Peak Systolic Velocity (cm/s)                  Prox    Prox-Mid  Mid    Mid-Dist  Distal  Monophasic                        137              109     CFA  Biphasic        67                                         PFA  Monophasic      98                 125                      SFA                                                             POP                                                             AT                                                             PT                                                             BRITTON  FINDINGS  Right.  There is atherosclerotic plaque seen throughout the limb.  Stenosis of the distal femoral artery. Velocities are consistent with 50-  75% stenosis.  Waveforms at the common femoral, profunda femoral and femoral artery are  triphasic.  Waveforms at the popliteal and distal anterior tibial artery are biphasic.  The remaining vessels were not properly visualized evaluated due to edema,  severe pain in non-healing bleeding wounds and large body habitus.  Left.  There is atherosclerotic plaque seen throughout the limb.  Waveforms at the common femoral, profunda femoral and femoral artery are  monophasic.  The remaining vessels of the left lower limb were not properly  visualized/evaluated due to edema, severe pain in area of non-healing  bleeding wounds and large body habitus.  Nima Dill MD  (Electronically Signed)  Final Date:      2020                   03:22    Us-extremity Venous Lower Bilat    Result Date: 10/30/2020   Vascular Laboratory  CONCLUSIONS  1) Normal bilateral superficial and deep venous examination of the lower  extremities.  2) Lower extremity edema, limits diagnostic sensitivity of this exam  JARRETT WHITE  Exam Date:     10/30/2020 00:46  Room #:     Inpatient  Priority:     Stat  Ht (in):             Wt (lb):  Ordering Physician:        ELIZABETH HILARIO  Referring Physician:       [id number] New  Sonographer:               Silvia Lassiter RVT, RDMS  Study Type:                Complete Bilateral  Technical Quality:         Adequate  Age:    54    Gender:     M  MRN:    [id number]  :    1965      BSA:  Indications:     Localized swelling, mass and lump, lower  limb, bilateral,                   Edema, unspecified, Ulceration of LE  CPT Codes:       25102  ICD Codes:       R22.43  R60.9  707.1  History:         Swelling of bilateral lower limbs. Edema. Nonhealing, raw and                   bleeding wounds bilaterally.  Limitations:     Edema, large body habitus and severe pain in limbs at touch  PROCEDURES:  Bilateral lower extremity venous duplex imaging.  The following venous structures were evaluated: common femoral, profunda  femoral, proximal greater saphenous, femoral, popliteal , peroneal and  posterior tibial veins.  Serial compression, augmentation maneuvers,  color and spectral Doppler  flow evaluations were performed.  FINDINGS:  Bilateral lower extremities  No superficial or deep venous thrombosis.  Complete color filling and compressibility with normal venous flow dynamics  including spontaneous flow, response to augmentation maneuvers, and  respiratory phasicity.  The peroneal and posterior tibial veins are difficult to assess for  compressibility and flow by color flow due to severe pain in limbs through  the nonhealing bleeding wounds and edema.  Interstitial fluid consistent with edema is observed in the thigh and below  the knee.  Edema reduces the image quality.  Nima Dill MD  (Electronically Signed)  Final Date:      2020                   03:19    Ec-echocardiogram Complete W/o Cont    Result Date: 10/31/2020  Transthoracic Echo Report Echocardiography Laboratory CONCLUSIONS Poor quality echo, consider repeating with contrast Probably normal LVEF Mild aortic stenosis. RVSP estimated at 30-35 mmHg. JARRETT WHITE Exam Date:         10/31/2020                    09:44 Exam Location:     Inpatient Priority:          Routine Ordering Physician:        MORIAH HUI Referring Physician: Sonographer:               Stalin Mtz RDCS Age:    54     Gender:    M MRN:    [id number] :    1965 BSA:    2.58   Ht (in):    77     Wt (lb):    280  Exam Type:     Complete Indications:     Heart Failure, Systolic ICD Codes:       428.2 CPT Codes:       20224 BP:   119    /   67     HR:   73 Technical Quality:       Technically difficult study                          incomplete information is                          obtained MEASUREMENTS  (Male / Female) Normal Values 2D ECHO LVOT Diameter                     2 cm                  DOPPLER AV Peak Velocity                  2.6 m/s               AV Peak Gradient                  27.9 mmHg             AV Mean Gradient                  17.7 mmHg             LVOT Peak Velocity                1.4 m/s               AV Area Cont Eq vti               1.6 cm2               MV Velocity Time Integral         41 cm                 Mitral E Point Velocity           1 m/s                 Mitral E to A Ratio               1.1                   Mitral A Duration                 96.9 ms               MV Pressure Half Time             102 ms                MV Area PHT                       2.2 cm2               MV Deceleration Time              352 ms                TR Peak Velocity                  258 cm/s              PV Peak Velocity                  1.3 m/s               PV Peak Gradient                  6.5 mmHg              PV Mean Gradient                  3.9 mmHg              * Indicates values subject to auto-interpretation LV EF:        % FINDINGS Left Ventricle Normal left ventricular chamber size. Normal left ventricular wall thickness. Unable to determine diastolic function. Reliable ejection fraction estimate cannot be made due to technical limitation. Right Ventricle Right ventricle not well visualized. Right Atrium Dilated inferior vena cava with inspiratory collapse. Left Atrium Left atrium not well visualized. Mitral Valve Mitral annular calcification. No stenosis or regurgitation seen. Aortic Valve The aortic valve is not well visualized. Mild aortic stenosis. Vmax is 2.5  m/s. Transvalvular gradients are -  "Peak: 26 mmHg, Mean: 18 mmHg. No aortic insufficiency. Tricuspid Valve Structurally normal tricuspid valve. No stenosis or regurgitation seen. RVSP estimated at 30-35 mmHg Pulmonic Valve Structurally normal pulmonic valve. No pulmonic stenosis. Mild pulmonic insufficiency. Pericardium Normal pericardium without effusion. Aorta Normal aortic root for body surface area. Ascending aorta diameter is 3.2 cm. Quinton Parkinson MD (Electronically Signed) Final Date:     31 October 2020                 12:02    Ec-echocardiogram Ltd W/ Cont    Result Date: 11/2/2020  Results Will be Available after Interpretation by Cardiologist.      Micro:  Results     Procedure Component Value Units Date/Time    Blood Culture [[id number]] Collected: 11/01/20 1302    Order Status: Completed Specimen: Other Updated: 11/01/20 1658    Blood Culture [[id number]] Collected: 11/01/20 1302    Order Status: Completed Specimen: Other Updated: 11/01/20 1618    BLOOD CULTURE [[id number]] Collected: 11/01/20 1400    Order Status: Sent Specimen: Blood from Peripheral     BLOOD CULTURE [[id number]] Collected: 11/01/20 1400    Order Status: Sent Specimen: Blood from Peripheral     E-Test [[id number]] Collected: 10/29/20 2338    Order Status: Completed Specimen: Other Updated: 11/01/20 0839     ETEST Sensitivity FINAL    Narrative:      171 tel. [phone] 10/31/2020, 11:38, RB PERF. RESULTS CALLED TO:[id number]  Per Hospital Policy: Only change Specimen Src: to \"Line\" if  specified by physician order.    BLOOD CULTURE x2 [[id number]]  (Abnormal)  (Susceptibility) Collected: 10/29/20 2338    Order Status: Completed Specimen: Blood from Peripheral Updated: 11/01/20 0839     Significant Indicator POS     Source BLD     Site PERIPHERAL     Culture Result Growth detected by Bactec instrument. 10/30/2020  12:52      Beta Hemolytic Streptococcus group A  This isolate is presumed to be clindamycin resistant based on  detection of inducible resistance.  Clindamycin may " "still  be effective in some patients.      Narrative:      CALL  [place]  171 tel. [phone],  CALLED  171 tel. [phone] 10/31/2020, 11:38, RB PERF. RESULTS CALLED  TO:[id number]  Per Hospital Policy: Only change Specimen Src: to \"Line\" if  specified by physician order.  Right AC    Susceptibility     Beta hemolytic streptococcus group a (1)     Antibiotic Interpretation Microscan Method Status    Penicillin Sensitive 0.032 mcg/mL E-TEST Final    Cefotaxime Sensitive 0.023 mcg/mL E-TEST Final                   CULTURE WOUND W/ GRAM STAIN [[id number]]  (Abnormal)  (Susceptibility) Collected: 10/30/20 0103    Order Status: Completed Specimen: Wound from Abscess Updated: 11/01/20 0756     Significant Indicator POS     Source WND     Site Bilateral Lower Extremity     Culture Result Light growth mixed skin eloy.     Gram Stain Result Moderate Gram positive cocci.  Rare small Gram positive rods.       Culture Result Staphylococcus aureus  Heavy growth        Beta Hemolytic Streptococcus group A  Heavy growth      Narrative:      CALL  [place]  171 tel. [phone],  CALLED  171 tel. [phone] 10/31/2020, 11:44, RB PERF. RESULTS CALLED  TO:[id number] RN    Susceptibility     Staphylococcus aureus (1)     Antibiotic Interpretation Microscan Method Status    Azithromycin Resistant >4 mcg/mL JELANI Final    Clindamycin Sensitive <=0.5 mcg/mL JELANI Final    Cefazolin Sensitive <=8 mcg/mL JELANI Final    Ceftaroline Sensitive <=0.5 mcg/mL JELANI Final    Daptomycin Sensitive <=1 mcg/mL JELANI Final    Ampicillin/sulbactam Sensitive <=8/4 mcg/mL JELANI Final    Erythromycin Resistant >4 mcg/mL JELANI Final    Vancomycin Sensitive 1 mcg/mL JELANI Final    Oxacillin Sensitive 0.5 mcg/mL JELANI Final    Penicillin Resistant >8 mcg/mL JELANI Final    Pip/Tazobactam Sensitive <=4 mcg/mL JELANI Final    Trimeth/Sulfa Sensitive <=0.5/9.5 mcg/mL JELANI Final    Tetracycline Sensitive <=4 mcg/mL JELANI Final                   BLOOD CULTURE x2 [[id number]] Collected: 10/29/20 2336    " "Order Status: Completed Specimen: Blood from Peripheral Updated: 10/31/20 0805     Significant Indicator NEG     Source BLD     Site PERIPHERAL     Culture Result No Growth  Note: Blood cultures are incubated for 5 days and  are monitored continuously.Positive blood cultures  are called to the RN and reported as soon as  they are identified.      Narrative:      Per Hospital Policy: Only change Specimen Src: to \"Line\" if  specified by physician order.  Right Forearm/Arm    GRAM STAIN [[id number]] Collected: 10/30/20 0103    Order Status: Completed Specimen: Wound Updated: 10/30/20 2155     Significant Indicator .     Source WND     Site Bilateral Lower Extremity     Gram Stain Result Moderate Gram positive cocci.  Rare small Gram positive rods.      Urinalysis [[id number]]  (Abnormal) Collected: 10/30/20 0634    Order Status: Completed Specimen: Urine, Cath Updated: 10/30/20 0733     Color DK Yellow     Character Turbid     Specific Gravity 1.027     Ph 5.0     Glucose Negative mg/dL      Ketones Negative mg/dL      Protein 100 mg/dL      Bilirubin Moderate     Urobilinogen, Urine 1.0     Nitrite Negative     Leukocyte Esterase Trace     Occult Blood Moderate     Micro Urine Req Microscopic    Narrative:      If not done within the last 24 hours    URINALYSIS [[id number]]     Order Status: Canceled Specimen: Urine, Cath     SARS-CoV-2, PCR (In-House) [[id number]] Collected: 10/30/20 0126    Order Status: Completed Updated: 10/30/20 0242     SARS-CoV-2 Source NP Swab     SARS-CoV-2 by PCR NotDetected     Comment: [facility] providers: PLEASE REFER TO DE-ESCALATION AND RETESTING PROTOCOL  on [email or web address]  **The [employer] GeneXpert Xpress SARS-CoV-2 Test has been made available for  use under the Emergency Use Authorization (EUA) only.         Narrative:      Is patient being admitted?->Yes  Does this patient meet criteria for Rush/Cepheid per [facility]  Inpatient Workflow? (See workflow link below)->Yes  Expected turn around " time?->Rush (Cepheid 2-4 hours)  Is this the patients First SARS CoV-2 test?->Unknown  Is this patient employed in healthcare?->No  Is the patient symptomatic as defined by the CDC?->Unknown  Is the patient hospitalized?->Yes  Is the patient in the ICU?->Unknown  Is the patient a resident in a congregate care setting?->No  Is the patient pregnant?->No    COVID/SARS CoV-2 PCR [[id number]] Collected: 10/30/20 0126    Order Status: Completed Specimen: Respirate from Nasopharyngeal Updated: 10/30/20 0142     COVID Order Status Received     Comment: The order for SARS CoV-2 testing has been received by the  Laboratory. This result is neither positive nor negative.  Final results of testing will report in 24-48 hours, separately.         Narrative:      Is patient being admitted?->Yes  Does this patient meet criteria for Rush/Cepheid per [facility]  Inpatient Workflow? (See workflow link below)->Yes  Expected turn around time?->Rush (Cepheid 2-4 hours)  Is this the patients First SARS CoV-2 test?->Unknown  Is this patient employed in healthcare?->No  Is the patient symptomatic as defined by the CDC?->Unknown  Is the patient hospitalized?->Yes  Is the patient in the ICU?->Unknown  Is the patient a resident in a congregate care setting?->No  Is the patient pregnant?->No    Blood Culture [[id number]] Collected: 10/30/20 0000    Order Status: Canceled Specimen: Other from Peripheral     Blood Culture [[id number]] Collected: 10/30/20 0000    Order Status: Canceled Specimen: Other from Peripheral           Assessment:  Active Hospital Problems    Diagnosis   • *Cellulitis [L03.90]   • Right-sided heart failure (HCC) [I50.810]   • Sepsis (HCC) [A41.9]   • High anion gap metabolic acidosis [E87.2]   • Supratherapeutic INR [R79.1]   • Paroxysmal atrial fibrillation (HCC) [I48.0]   • LINSEY (acute kidney injury) ([facility]) [N17.9]   • Polycythemia [D75.1]   • Elevated troponin [R77.8]   • Heart failure with preserved ejection fraction,  borderline, class IV ([facility]) [I50.30]   • Type 2 diabetes mellitus with complication, without long-term current use of insulin ([facility]) [E11.8]   • COPD (chronic obstructive pulmonary disease) ([facility]) [J44.9]   • Hyponatremia [E87.1]   • Elevated LFTs [R79.89]     Interval 24 hours:      AF, O2 10 L, increased  Labs reviewed, WBC 24.8   Imaging personally reviewed both images and report.   Studies reviewed  Micro reviewed    Dressing change today by nurses extremely tender to palpation particularly right lower leg.  Continued on antibiotics as below.    Assessment:  Hypoxia, worse today  -Now on 10 L, chest x-ray yesterday with edema, no obvious pneumonia  Bacteremia, GAS  -Cultures from 10/29 and 10/30 with group A strep, clindamycin resistant, penicillin sensitive, 0.032, cefotaxime sensitive 0.023  -TTE on 11/1 with results pending  Bilateral lower extremity necrotizing cellulitis and concern for development of necrotizing fasciitis-pain out of proportion on right and appears to be worsening area of edema erythema and pain on the left  -Wound cultures with GAS and MSSA, eliana stain with GPCs and rare GPRs  - He has been seen by vascular surgery and ortho - so far refusing any intervention  -CT lower extremity right without contrast on 10/31 without abscess or soft tissue gas.  CT lower extremity left with no discrete fluid collection to suggest abscess or noted air in the tissues.  Leukocytosis, increasing over last 2 days  Acute kidney injury-improving  Demand ischemia with elevation of his troponins and proBNP  Penicillin allergic, with anaphylaxis and rash, however this was as a child so unknown if still present - tolerated cefepime, ceftriaxone and cefazolin per notes   Diabetes    Plan:  --- Stop daptomycin and clindamycin-started cefazolin for the group A strep bacteremia and will also cover the MSSA cellulitis.  Due to concern for toxin production and potential necrotizing fasciitis initiated linezolid to give  additional therapy and thought to be better choice given reported clindamycin resistance of the group A strep  --- Continue to monitor labs and vitals, if he worsens repeat CT of both lower extremities to see if he has developed necrotizing fasciitis or abscess   --- Before the patient is refusing any surgical intervention   --- Monitor control blood sugar       Prognosis guarded.  Discussed with Dr. Eller, ICU nurse and ID pharmacy.

## 2020-11-02 NOTE — PROGRESS NOTES
Progress Note    CC: f/u for BLE wound infection, sepsis    Interval History: 54 y.o. male w/ CAD s/p CABG, morbid obesity, active smoking, PAD and longstanding hx of wounds to both legs. Presented 10/29/2020 with sepsis d/t infected BLE wounds. Transferred to ICU given multiple comorbidities and resuscitation needs. Pt indicated yesterday that he does not want an amputation and tells me today that he is reluctant to proceed w/ any surgery until he speaks with family members.     11/2 - Seen today. RN states the left leg cellulitis is progressing proximally and the foot looks more edematous. Joshua has not been able to speak with his brother, so remains unwilling to proceed with any decisions regarding surgery.     Review of Systems  Negative for chest pain or SOB  Negative for stroke/TIA    Medications  Prior to Admission Medications   Prescriptions Last Dose Informant Patient Reported? Taking?   acetaminophen (TYLENOL) 325 MG Tab prn at prn  No No   Sig: Take 2 Tabs by mouth every 6 hours as needed (Mild Pain; (Pain scale 1-3); Temp greater than 100.5 F).   albuterol 108 (90 Base) MCG/ACT Aero Soln inhalation aerosol prn at prn Patient Yes No   Sig: Inhale 2 Puffs by mouth every 6 hours as needed for Shortness of Breath.   amiodarone (CORDARONE) 200 MG Tab 10/29/2020 at am  No No   Sig: Take 1 Tab by mouth every morning.   ferrous sulfate 325 (65 Fe) MG tablet 10/29/2020 at am Patient Yes No   Sig: Take 325 mg by mouth every morning.   furosemide (LASIX) 40 MG Tab 10/29/2020 at am  No No   Sig: Take 1 Tab by mouth every morning.   ipratropium-albuterol (DUONEB) 0.5-2.5 (3) MG/3ML nebulizer solution prn at prn Patient No No   Sig: 3 mL by Nebulization route every 6 hours as needed for Shortness of Breath.   lisinopril (PRINIVIL) 2.5 MG Tab 10/29/2020 at am  No No   Sig: Take 1 Tab by mouth every morning.   metoprolol SR (TOPROL XL) 25 MG TABLET SR 24 HR 10/29/2020 at am  No No   Sig: Take 1 Tab by mouth every  morning.   polyethylene glycol/lytes (MIRALAX) Pack 10/29/2020 at am Patient Yes No   Sig: Take 17 g by mouth every morning.   potassium chloride SA (K-DUR) 10 MEQ Tab CR 10/29/2020 at am  No No   Sig: Take 1 Tab by mouth every morning.   rosuvastatin (CRESTOR) 40 MG tablet 10/29/2020 at am  No No   Sig: Take 1 Tab by mouth every morning.   spironolactone (ALDACTONE) 25 MG Tab 10/29/2020 at am  No No   Sig: Take 1 Tab by mouth every morning.   tiotropium (SPIRIVA) 18 MCG Cap 10/29/2020 at am Patient Yes No   Sig: Inhale 18 mcg by mouth every morning.   warfarin (COUMADIN) 5 MG Tab 10/29/2020 at unk  No No   Sig: TAKE 1 & 1/2 (ONE & ONE-HALF) TABLETS BY MOUTH ONCE DAILY OR  AS  DIRECTED  BY  COUMADIN  CLINIC      Facility-Administered Medications: None         Physical Exam  Vitals:    11/02/20 0800   BP: 110/74   Pulse: 79   Resp: (!) 22   Temp:    SpO2:        Pulse/Extremity Exam:    Pedal Pulses:       Right DP monophasic       Right PT monophasic       Left DP monophasic       Left PT absent    Wounds: BLE diffusely edematous and violaceous. Partial thickness eschar over LLE, sloughing/weeping. Cellulitis to left upper thigh. Partial-thickness wounds over dorsum of R ankle, weeping. No crepitus or ballotable collection.      General appearance: NAD, conversing appropriately  Psych: Normal affect, mood, judgement  Neuro: CN II-XII grossly intact.   Neck: full range of motion  Lungs: No inspiratory stridor or wheezing  CV: RRR  Abdomen: Soft, NT/ND  Skin: maculopapular rash noted around the umbilicus      Labs reviewed today:  Recent Labs     10/31/20  0456 11/01/20  0420 11/02/20  0345   WBC 19.8* 21.9* 24.8*   RBC 6.30* 6.47* 6.42*   HEMOGLOBIN 18.6* 18.8* 18.7*   HEMATOCRIT 57.2* 57.9* 57.1*   MCV 90.8 89.5 88.9   MCH 29.5 29.1 29.1   MCHC 32.5* 32.5* 32.7*   RDW 61.4* 60.2* 59.7*   PLATELETCT 102* 100* 156*   MPV 11.0 11.2 11.1     Recent Labs     10/31/20  2210 11/01/20  0420 11/02/20  0345   SODIUM 127* 129*  130*   POTASSIUM 4.5 4.6 4.1   CHLORIDE 95* 96 95*   CO2 19* 20 24   GLUCOSE 159* 127* 185*   BUN 55* 55* 47*   CREATININE 2.00* 1.93* 1.55*   CALCIUM 8.3* 8.6 7.9*     Recent Labs     10/31/20  2210 20  0420 20  0345   GLUCOSE 159* 127* 185*     Recent Labs     10/31/20  0258 20  0420 20  0345   INR 3.86* 3.49* 3.21*             No results for input(s): TROPONINI in the last 72 hours.    Urinalysis:    Recent Labs     10/30/20  0634   SPECGRAVITY 1.027   GLUCOSEUR Negative   KETONES Negative   NITRITE Negative   LEUKESTERAS Trace*   WBCURINE 5-10*   RBCURINE 0-2*   BACTERIA Negative   EPITHELCELL Few        Imaging & Data Review:   Labs reviewed:  WBC 24.8, increased  Hgb 18.7, stable  Platelet 156, increased  Creatinine 1.55, decreased  Lactic acid 2.8 (yesterday)  INR 3.21      Arterial Duplex: Limited eval of tibials d/t edema. Diffuse bilateral disease w/ multiphasic waveforms throughout on the right. Monophasic throughout the LLE.     Venous Duplex: No DVT/SVT in either leg    10/31 CT legs: Diffuse edema bilaterally w/ no localized fluid collection or gas.    Assessment/Plan & Medical Decision-MakinM admitted w/ chronic infected BLE wounds, severe cellulitis, sepsis. Cellulitis appears severe, but stable.    No acute vascular intervention planned at this point. Has signal in the bilateral pops and in the feet. Source control is primary concern. Would defer to ortho for any necessary debridement. Patient understands that if infection progresses, it could become life-threatening. He understands and remains opposed to any surgery at this time.    Thank you for involving us in this patient's care. Please call with questions.      Tatiana Hart PA-C  Vascular Surgery  [facility]  Office: [phone]

## 2020-11-02 NOTE — PROCEDURES
Central Line Insertion   Date/Time: 11/1/2020 4:15PM  Performed by: Dayron Uribe M.D.  Supervised by: Aamir Cole D.O.     Indication: Sepsis with hypotension requiring pressor support     Consent:     Consent obtained:  Yes    Consent given by:  Patient    Risks discussed:  Arterial puncture, bleeding, infection, incorrect placement, pneumothorax and nerve damage    Alternatives discussed:  Alternative treatment   Universal protocol:     Site/side marked: yes      Immediately prior to procedure, a time out was called: yes      Patient identity confirmed:  Verbally with patient and arm band  Pre-procedure details:     Hand hygiene: Hand hygiene performed prior to insertion      Sterile barrier technique: All elements of maximal sterile technique followed      Skin preparation:  2% chlorhexidine    Skin preparation agent: Skin preparation agent completely dried prior to procedure    Sedation:     Sedation type:  None  Anesthesia:     Anesthesia method:  Local infiltration    Local anesthetic:  Lidocaine 1% w/o epi  Procedure details:     Location:  R internal jugular    Patient position:  Trendelenburg    Procedural supplies:  Triple lumen    Catheter size:  7 Fr    Landmarks identified: yes      Ultrasound guidance: yes      Sterile ultrasound techniques: Sterile gel and sterile probe covers were used      Number of attempts:  1    Successful placement: yes    Post-procedure details:     Post-procedure:  Dressing applied    Assessment:  Blood return through all ports, free fluid flow, no pneumothorax on x-ray and placement verified by x-ray    Patient tolerance of procedure:  Tolerated well, no immediate complications

## 2020-11-02 NOTE — HEART FAILURE PROGRAM
Although sepsis due to LE wounds is patient's principal problem for this admission, he is also in acutely decompensated HF per intensivists.     Patient initially had HFrEF but improved with GDMT. Repeat echo is pending final report.     In addition to not knowing the current EF, patient is admitted to T607 and is on levophed. Therefore, it is not appropriate at this time to review the chart for GDMT.    Please see below for measures that must be addressed prior to discharge.     Daily Nurse: please begin to fill out the HF checklist (pink sheet in hard chart) and use it to guide your daily care.    Discharge Nurse: please ensure completeness of the HF checklist (pink sheet in hard chart) and have it co-signed by the charge RN before the patient leaves the hospital.  Thank you, Taylor, Cardio RN Navigator [phone]        HF Measures:  1. Documentation of LV systolic function (echo or cath) PTA, during this hospitalization, or plan to assess post discharge or reason for not assessing documented  2. Documentation of fluid intake and urine output every nursing shift  3. 2 hour post diuretic assessment documented 2 hours after diuretic given  4. HF Patient Education using the Living Well With Heart Failure Booklet and Symptom Tracker documented every nursing shift  5. Nutrition consult for diet education  6. Daily weights (one weight documented every 24 hours) on a standing scale unless standing is contraindicated in which case bed scale can be used - have patient write weight on symptom tracker  7. For LVEF less than or equal to 40%, ACE-I, ARNI or ARB prescribed at discharge   8. For LVEF less than or equal to 40%, an Evidence Based Beta Blocker (bisoprolol, carvedilol, toprol xl) must be prescribed at discharge  9. For LVEF less than or equal to 35% aldosterone blockade prescribed at discharge  10. The combination of hydralazine and isosorbide dinitrate is recommended to reduce morbidity and mortality for patients  self-described  Americans with NYHA class III-IV HFrEF (EF 40% or less), receiving optimal therapy with ACE inhibitors and beta blockers, unless contraindicated (Class I, MARINA: A).  11. If a HF patient is diabetic or is newly diagnosed with DM: prescribed diabetes treatment at discharge in the form of glycemic control (diet or anti-hyperglycemic medication) or f/u appointment for diabetes management scheduled at discharge.  12. If a HF patient has diabetes: prescribed lipid lowering medication at discharge  13. Documented smoking cessation advice or counseling  14. If a HF patient has a-fib: anticoagulation is prescribed upon discharge or contraindication is documented  15. Screening for and administering immunizations as long as no contraindications: Pneumonia (regardless of age) and Influenza  16. Written discharge instructions include:  ? Daily weights  ? Record weight on tracker  ? Bring tracker to appointments  ? Call MD for weight gain of 3lb /day or 5lb/week  ? HF medication teaching  ? Low sodium diet  ? Follow up appointment within seven calendar days of d/c must include: date, time and location  ? Activity  ? Worsening symptoms    What if any of the above HF measures are contraindicated?  ? Request that the discharging provider document the medication/intervention and the contraindication specifically in a progress note  ? For example: “no CHF meds due to hypotension” is not enough. It needs to say: “No ACE-I, ARNI, ARB due to hypotension”; “No Beta Blockade due to bradycardia”…

## 2020-11-02 NOTE — PROGRESS NOTES
Critical Care Progress Note    Date of admission  10/29/2020    Chief Complaint  54 y.o. male who presented 10/29/2020 with lower extremity infection bilateral that appears to be nec fascitis.  PMH cabg 2019, pafib, heart failure with normal ef, rosanna and copd.  He had an episode of hypoxia this am but now on 2 L and sating adequately. sbp 90-100s.  BNP elevated. CHest xray with edema.  He has been receiving fluids for sepsis. ECHO appears to have reduced ef.  He is alert and oriented, rr mid 20s.  Erythema and swelling to upper thighs bilateral. He is on antibiotics.  Patient currently wants to talk to his brother regarding continued medical therapy and defers ICU transfer currently.  I discussed goals of care and he would like to discuss them with his brother, currently he would rather not be intubated but is not definitive on code status.  He also had hyponatremia to 123.  I have ordered a jenkins catheter and lasix. Lactic acid is normal.  On linezolid, cefepime and flagyl.  Vascular and orthopedics have been consulted and likely will need amputation.  Thrombocytopenia likely from sepsis.      Hospital Course  10/31 admitted to ICU  10/31 CT legs bilaterally negative for abscesses or air.   11/2 O2 requirements increasing, now on 10 L, norepinephrine weaned off, signs of persisting sepsis from his cellulitis in LE, WBC 24.9, still refusing surgery, Clinda/Dapto    Interval Problem Update  Reviewed last 24 hour events    A&O x4  Pain in LE, edema worse on L, poor pulses  Moves all 4  SR PVCs  SBp 130s  UO good  Renal function improved  T-max 99.5, WBC increased to 24.8  No new positive cultures  10 LPM NC  Refuses IS  CXR wet  Cards following  Vasc following - pt refusing surgery - amputation  Warfarin at home - INR > 3  Dapto/Clinda  Weaned off pressors      Palliative care eval  Encourage IS  HF NC if needed  Mg repletion    Case reviewed with ID, de-escalating antibiotics to Ancef  Oxygen requirements increased,  work of breathing acceptable  Encouraging pulmonary toilet  Unable to mobilize patient  Blood pressure okay, remains off norepinephrine     YESTERDAY  Remains critically ill  On minimal NE gtt this am.   Afebrile  HR in 70-80s  SBP in 90-110s.   On 1L NC, sat >88%  WBC 21K, platelet 100k  Hgb 18.8  Sodium 129, K 4.6  Creatinine 1.93  INR 3.49  I/O with negative 270cc in the past 24 hours, -655cc since admission    Review of Systems  Review of Systems   Constitutional: Positive for malaise/fatigue (Persisting). Negative for fever.   HENT: Negative for congestion and sore throat.    Respiratory: Negative for cough, sputum production and shortness of breath.    Cardiovascular: Positive for leg swelling (Worse left lower extremity). Negative for chest pain.   Gastrointestinal: Negative for abdominal pain, diarrhea, nausea and vomiting.   Genitourinary: Negative for dysuria and urgency.   Musculoskeletal: Negative for back pain.        Leg pain bilaterally   Skin: Negative for rash.   Neurological: Positive for weakness (Diffuse). Negative for focal weakness and headaches.   Psychiatric/Behavioral: The patient is not nervous/anxious.    All other systems reviewed and are negative.       Vital Signs for last 24 hours   Temp:  [36.7 °C (98.1 °F)-37.5 °C (99.5 °F)] 37.5 °C (99.5 °F)  Pulse:  [70-86] 85  Resp:  [15-27] 19  BP: ()/(53-81) 120/73  SpO2:  [89 %-97 %] 95 %    Hemodynamic parameters for last 24 hours       Respiratory Information for the last 24 hours       Physical Exam   Physical Exam  Vitals signs reviewed.   Constitutional:       General: He is not in acute distress.     Appearance: He is morbidly obese. He is not ill-appearing, toxic-appearing or diaphoretic.      Interventions: Face mask in place.   HENT:      Head: Normocephalic and atraumatic.      Mouth/Throat:      Mouth: Mucous membranes are moist.   Eyes:      General: No scleral icterus.     Extraocular Movements: Extraocular movements intact.       Pupils: Pupils are equal, round, and reactive to light.   Neck:      Musculoskeletal: Neck supple.      Vascular: No JVD.      Trachea: Phonation normal.   Cardiovascular:      Rate and Rhythm: Normal rate and regular rhythm.      Pulses: Normal pulses.      Heart sounds: Normal heart sounds. No murmur.      Comments: SR w/PVCs  Pulmonary:      Effort: Pulmonary effort is normal. No respiratory distress.      Breath sounds: Normal breath sounds. No wheezing or rales.   Abdominal:      General: Abdomen is protuberant. Bowel sounds are normal. There is no distension.      Palpations: Abdomen is soft.      Tenderness: There is no abdominal tenderness. There is no right CVA tenderness, left CVA tenderness or guarding.   Musculoskeletal:         General: No swelling.      Comments: Bilateral lower leg extremities - erythema, edematous, tender to touch. Kerlix dressing wrapped around the legs   Skin:     General: Skin is warm and dry.      Capillary Refill: Capillary refill takes 2 to 3 seconds.      Coloration: Skin is not cyanotic or jaundiced.      Findings: Ecchymosis, erythema and wound (Bilateral lower extremities) present.      Nails: There is no clubbing.     Neurological:      General: No focal deficit present.      Mental Status: He is alert and oriented to person, place, and time.      Cranial Nerves: No cranial nerve deficit.      Motor: Weakness (Nonfocal/diffuse) present.      Comments: Follows well   Psychiatric:         Attention and Perception: Attention normal.         Speech: Speech normal.         Behavior: Behavior is not agitated. Behavior is cooperative.         Thought Content: Thought content normal.         Cognition and Memory: Cognition normal.         Medications  Current Facility-Administered Medications   Medication Dose Route Frequency Provider Last Rate Last Admin   • ceFAZolin in dextrose (ANCEF) IVPB premix 2 g  2 g Intravenous Q8HRS Carol Barber M.D.   Stopped at 11/02/20  1658   • linezolid (ZYVOX) tablet 600 mg  600 mg Oral Q12HRS Carol Barber M.D.   600 mg at 11/02/20 1629   • furosemide (LASIX) injection 80 mg  80 mg Intravenous BID DIURETIC Quinton Parkinson M.D.   80 mg at 11/02/20 1628   • norepinephrine (Levophed) infusion 8 mg/250 mL (premix)  0-30 mcg/min Intravenous Continuous Jodi Small M.D.   Stopped at 11/01/20 0905   • HYDROmorphone pf (DILAUDID) injection 0.5-1 mg  0.5-1 mg Intravenous Q2HRS PRN Meng Seymour M.D.   1 mg at 11/02/20 1341   • albuterol inhaler 2 Puff  2 Puff Inhalation Q6HRS PRN Jayro Vásquez M.D.   2 Puff at 11/01/20 1901   • amiodarone (Cordarone) tablet 200 mg  200 mg Oral MARTHA Vásquez M.D.   200 mg at 11/02/20 0618   • ipratropium-albuterol (DUONEB) nebulizer solution  3 mL Nebulization Q6HRS PRN (RT) Jayro Vásquez M.D.       • glycopyrrolate (Seebri) 15.6 mcg inhalation capsule 1 Cap  1 Cap Inhalation BID (RT) Jayro Vásquez M.D.   1 Cap at 11/01/20 1852   • senna-docusate (PERICOLACE or SENOKOT S) 8.6-50 MG per tablet 2 Tab  2 Tab Oral BID Artem Givens M.D.   2 Tab at 11/02/20 1628    And   • polyethylene glycol/lytes (MIRALAX) PACKET 1 Packet  1 Packet Oral QDAY PRN Artem Givens M.D.        And   • magnesium hydroxide (MILK OF MAGNESIA) suspension 30 mL  30 mL Oral QDAY PRN Artem Givens M.D.        And   • bisacodyl (DULCOLAX) suppository 10 mg  10 mg Rectal QDAY PRN Artem Givens M.D.       • Respiratory Therapy Consult   Nebulization Continuous RT Artem Givens M.D.       • cloNIDine (CATAPRES) tablet 0.1 mg  0.1 mg Oral Q6HRS PRN Artem Givens M.D.       • acetaminophen (TYLENOL) tablet 500 mg  500 mg Oral Q6HRS PRROSANA Givens M.D.       • HYDROcodone/acetaminophen (NORCO)  MG per tablet 1 Tab  1 Tab Oral Q6HRS PRN Artem Givens M.D.   1 Tab at 11/02/20 1628   • insulin regular (HumuLIN R,NovoLIN R) injection  1-6 Units Subcutaneous Q6HRS Artem Givens M.D.   1 Units at 11/02/20 1703    And   • glucose 4 g  chewable tablet 16 g  16 g Oral Q15 MIN PRN Artem Givens M.D.        And   • dextrose 50% (D50W) injection 50 mL  50 mL Intravenous Q15 MIN PRN Artem Givens M.D.           Fluids    Intake/Output Summary (Last 24 hours) at 11/2/2020 2016  Last data filed at 11/2/2020 1800  Gross per 24 hour   Intake 31.67 ml   Output 72511 ml   Net -24850.33 ml       Laboratory  Recent Labs     10/31/20  1411   ISTATAPH 7.320*   ISTATAPCO2 34.4   ISTATAPO2 73   ISTATATCO2 19*   OXTIDGN9RTP 93   ISTATARTHCO3 17.7   ISTATARTBE -7*   ISTATTEMP see below   ISTATFIO2 2   ISTATSPEC Arterial     Recent Labs     11/01/20 0420   CPKTOTAL 156*     Recent Labs     10/31/20  2210 11/01/20  0420 11/02/20  0345   SODIUM 127* 129* 130*   POTASSIUM 4.5 4.6 4.1   CHLORIDE 95* 96 95*   CO2 19* 20 24   BUN 55* 55* 47*   CREATININE 2.00* 1.93* 1.55*   MAGNESIUM  --  1.9 1.7   CALCIUM 8.3* 8.6 7.9*     Recent Labs     10/31/20  2210 11/01/20  0420 11/02/20  0345   GLUCOSE 159* 127* 185*     Recent Labs     10/31/20  0456 11/01/20  0420 11/02/20  0345   WBC 19.8* 21.9* 24.8*   NEUTSPOLYS 91.60* 96.60* 95.00*   LYMPHOCYTES 1.20* 0.90* 1.70*   MONOCYTES 3.90 1.70 1.70   EOSINOPHILS 0.40 0.90 0.00   BASOPHILS 0.60 0.00 0.00     Recent Labs     10/31/20  0258 10/31/20  0456 11/01/20  0420 11/02/20  0345   RBC 6.28* 6.30* 6.47* 6.42*   HEMOGLOBIN 18.8* 18.6* 18.8* 18.7*   HEMATOCRIT 57.5* 57.2* 57.9* 57.1*   PLATELETCT 109* 102* 100* 156*   PROTHROMBTM 39.1*  --  36.1* 33.8*   INR 3.86*  --  3.49* 3.21*       Imaging  X-Ray:  I have personally reviewed the images and compared with prior images.  CT:    Reviewed      CT leg : IMPRESSION:  1.  No discrete fluid collection is identified to suggest abscess.  2.  Diffuse subcutaneous edema and overlying skin thickening with venous varicosities.  3.  Atherosclerosis.  4.  Diffuse muscle atrophy.    Assessment/Plan  * Cellulitis  Assessment & Plan  Severe bilateral leg cellulitis. Initial concern for necrotizing  fascitis  CT legs bilaterally did not show any evidence of fluid collection/abscess or soft tissue gas.   His erythema not appear worsening compared to yesterday/edema worse  Pt is on clindamycin and daptomycin, ABX per ID  Need to reduce his leg swelling as this is not helping, elevation  Lasix 80 BID.   Pt still refusing surgery at this time. Will manage conservatively for now, prognosis poor    Dr Douglas had a very long discussion with him and daughter about his critical condition. He explained about his severe cellulitis that could potentially get worsened if not treated appropriately. All questions were answered. He also explained about our limited visitation hours due to COVID pandemic. Family didn't seem to support our visitation hours. He offered an apology. He did his best to explain to them about his critical condition and visitation hours. His daughter seems to understand the situation    Right-sided heart failure (HCC)  Assessment & Plan  Forced diuresis when clinically appropriate  SHASHANK?    Sepsis (HCC)  Assessment & Plan  Sepsis due to lower ext infection Likely nec fasc clinically but CT negative for usual abnormalities, monitoring  Ortho and vascular consulted, likely will need amputation at some point, patient declining/refusing for now  Concern of worsening cellulitis - erythema/edema slight worse?,  WBC it did MRI consistent care work on notes that this increasing  Pressors to support MAP >65 and SBP >90, norepinephrine infusion as needed  Diuresis as tolerated  Antibiotics, ID consult reviewed  Serial cultures wound/blood as needed    High anion gap metabolic acidosis  Assessment & Plan  Likely from poor perfusion  Improved    Supratherapeutic INR- (present on admission)  Assessment & Plan  Likely from sepsis  Monitor, transfuse/vitamin K as clinically prudent per protocols    Paroxysmal atrial fibrillation (HCC)- (present on admission)  Assessment & Plan  Cardiac monitoring  On amiodarone and  metoprolol  Optimize electrolytes    LINSEY (acute kidney injury) ([facility])  Assessment & Plan  Improved  Acute kidney injury 2/2 likely ATN due to sepsis  Lasix 80 BID, goal negative fluid balance as tolerated  Monitor UOP closely, strict I/Os      Elevated LFTs  Assessment & Plan  Likely from poor perfusion  Serial CMP  Avoid hepatotoxins    Polycythemia  Assessment & Plan  Daily cbc  O2  Eval for SHASHANK  Phlebotomy as needed    Elevated troponin- (present on admission)  Assessment & Plan  Likely type 2 MI from poor perfusion from sepsis, heart failure  ECHO noted, repeat pending  Monitor    Heart failure with preserved ejection fraction, borderline, class IV ([facility])- (present on admission)  Assessment & Plan  Prior hx, current echo appears reduced ef?  Diuresis as tolerated  On metoprolol      Type 2 diabetes mellitus with complication, without long-term current use of insulin ([facility])- (present on admission)  Assessment & Plan  Continue SSI  Monitor for the need for continuous insulin infusion  Hypoglycemia protocols  A1c 7.8    COPD (chronic obstructive pulmonary disease) ([facility])  Assessment & Plan  Not in exacerbation  duonebs prn  Albuterol prn  On seebri bid    Hyponatremia  Assessment & Plan  Persisting, mild  Reassess volume status daily  Lasix as tolerated  Low EF  Serial BMP       VTE:  Heparin  Ulcer: Not Indicated  Lines: Central Line  Ongoing indication addressed and Ceballos Catheter  Ongoing indication addressed    I have performed a physical exam and reviewed and updated ROS and Plan today (11/2/2020). In review of yesterday's note (11/1/2020), there are no changes except as documented above.     Discussed patient condition and risk of morbidity and/or mortality with RN, RT, Pharmacy, , [employer] resident, Code status disscussed, Charge nurse / hot rounds and Patient     The patient remains critically ill.  Oxygen requirements increasing, monitoring for the need for BiPAP versus intubation mechanical  ventilation.  Hemodynamics a bit better and weaned off norepinephrine but signs of sepsis persisting/increasing with white count now nearly 25,000.  Patient still declining surgery despite need from ID, orthopedic and CC perspective.  Prognosis very poor, patient high risk for increased morbidity mortality without above critical care interventions.    Critical care time = 45 minutes in directly providing and coordinating critical care and extensive data review.  No time overlap and excludes procedures.

## 2020-11-02 NOTE — PROGRESS NOTES
Limb preservation services are following patient, dressing care orders are in place for bedside RN to change.

## 2020-11-02 NOTE — WOUND TEAM
Wound consult placed regarding BLE. Chart and images reviewed. Pt being followed by LPS service. Pt was evaluated by NIKITA Mccormick for LPS on 10/31. Dressing orders are in place. Wound consult completed. Please defer to LPS service for further needs related to BLE.

## 2020-11-02 NOTE — RESPIRATORY CARE
COPD EDUCATION by COPD CLINICAL EDUCATOR  11/2/2020 at 12:05 PM by Amanda Hernandez, RRT     Patient interviewed by COPD education team. Patient refused COPD program at this time. Pt takes Spiriva, albuterol and Duoneb at home. Quit smoking 2017. PFT on file 2018.

## 2020-11-02 NOTE — PROGRESS NOTES
"                                               Cadiology Progress Note    Interval History:  11/2: Appears tired, on 10 L oxygen this morning. Denies any SOB/ chest pain. UOP 9.4L in last 24 hours.     Physical Exam   Blood pressure 110/74, pulse 79, temperature 36.7 °C (98.1 °F), temperature source Temporal, resp. rate (!) 22, height 1.956 m (6' 5\"), weight (!) 169.9 kg (374 lb 9 oz), SpO2 94 %.    Constitutional: Appears chronically ill, obese  HENT: Normocephalic and atraumatic. No scleral icterus.   Neck: JVD undetectable due to body habitus  Cardiovascular: Normal rate, irregular rythym. Exam reveals no gallop and no friction rub. + Systolic murmur  Pulmonary/Chest: Diminished breath sound bilaterally  Abdominal: S/NT/ND BS  Musculoskeletal: 2+ edema bilaterally, erythematous legs. Legs wrapped upto knee.   Skin: Skin is warm and dry.           Intake/Output Summary (Last 24 hours) at 11/2/2020 0908  Last data filed at 11/2/2020 0600  Gross per 24 hour   Intake 1113 ml   Output 9325 ml   Net -8212 ml       Recent Labs     10/31/20  0456 11/01/20 0420 11/02/20  0345   WBC 19.8* 21.9* 24.8*   RBC 6.30* 6.47* 6.42*   HEMOGLOBIN 18.6* 18.8* 18.7*   HEMATOCRIT 57.2* 57.9* 57.1*   MCV 90.8 89.5 88.9   MCH 29.5 29.1 29.1   MCHC 32.5* 32.5* 32.7*   RDW 61.4* 60.2* 59.7*   PLATELETCT 102* 100* 156*   MPV 11.0 11.2 11.1     Recent Labs     10/31/20  2210 11/01/20 0420 11/02/20  0345   SODIUM 127* 129* 130*   POTASSIUM 4.5 4.6 4.1   CHLORIDE 95* 96 95*   CO2 19* 20 24   GLUCOSE 159* 127* 185*   BUN 55* 55* 47*   CREATININE 2.00* 1.93* 1.55*   CALCIUM 8.3* 8.6 7.9*     Recent Labs     10/31/20  0258 11/01/20 0420 11/02/20  0345   INR 3.86* 3.49* 3.21*         Recent Labs     11/01/20 0420   CPKTOTAL 156*           No current facility-administered medications on file prior to encounter.      Current Outpatient Medications on File Prior to Encounter   Medication Sig Dispense Refill   • warfarin (COUMADIN) 5 MG Tab TAKE 1 " & 1/2 (ONE & ONE-HALF) TABLETS BY MOUTH ONCE DAILY OR  AS  DIRECTED  BY  COUMADIN  CLINIC 45 Tab 0   • rosuvastatin (CRESTOR) 40 MG tablet Take 1 Tab by mouth every morning. 90 Tab 3   • furosemide (LASIX) 40 MG Tab Take 1 Tab by mouth every morning. 90 Tab 3   • spironolactone (ALDACTONE) 25 MG Tab Take 1 Tab by mouth every morning. 90 Tab 3   • metoprolol SR (TOPROL XL) 25 MG TABLET SR 24 HR Take 1 Tab by mouth every morning. 90 Tab 3   • amiodarone (CORDARONE) 200 MG Tab Take 1 Tab by mouth every morning. 90 Tab 3   • lisinopril (PRINIVIL) 2.5 MG Tab Take 1 Tab by mouth every morning. 90 Tab 3   • potassium chloride SA (K-DUR) 10 MEQ Tab CR Take 1 Tab by mouth every morning. 90 Each 3   • acetaminophen (TYLENOL) 325 MG Tab Take 2 Tabs by mouth every 6 hours as needed (Mild Pain; (Pain scale 1-3); Temp greater than 100.5 F). 30 Tab 0   • albuterol 108 (90 Base) MCG/ACT Aero Soln inhalation aerosol Inhale 2 Puffs by mouth every 6 hours as needed for Shortness of Breath.     • ferrous sulfate 325 (65 Fe) MG tablet Take 325 mg by mouth every morning.     • polyethylene glycol/lytes (MIRALAX) Pack Take 17 g by mouth every morning.     • tiotropium (SPIRIVA) 18 MCG Cap Inhale 18 mcg by mouth every morning.     • ipratropium-albuterol (DUONEB) 0.5-2.5 (3) MG/3ML nebulizer solution 3 mL by Nebulization route every 6 hours as needed for Shortness of Breath. 180 Bullet 10       Current Facility-Administered Medications   Medication Dose Frequency Provider Last Rate Last Admin   • magnesium sulfate IVPB premix 2 g  2 g Once Kady Barraza M.D. 25 mL/hr at 11/02/20 0715 2 g at 11/02/20 0715   • DAPTOmycin 1,000 mg in NS 50 mL IVPB  1,000 mg Q24HR Silvio Eller M.D.       • furosemide (LASIX) injection 80 mg  80 mg BID DIURETIC Quinton Parkinson M.D.   80 mg at 11/02/20 0618   • clindamycin (CLEOCIN) IVPB premix 900 mg  900 mg Q8HRS Feli Hanson M.D.   Stopped at 11/02/20 1918   • norepinephrine (Levophed)  infusion 8 mg/250 mL (premix)  0-30 mcg/min Continuous Jodi Small M.D.   Stopped at 11/01/20 0905   • HYDROmorphone pf (DILAUDID) injection 0.5-1 mg  0.5-1 mg Q2HRS PRN Meng Seymour M.D.   1 mg at 11/02/20 0338   • albuterol inhaler 2 Puff  2 Puff Q6HRS PRN Jayro Vásquez M.D.   2 Puff at 11/01/20 1901   • amiodarone (Cordarone) tablet 200 mg  200 mg QA Jayro Vásquez M.D.   200 mg at 11/02/20 0618   • ipratropium-albuterol (DUONEB) nebulizer solution  3 mL Q6HRS PRN (RT) Jayro Vásquez M.D.       • glycopyrrolate (Seebri) 15.6 mcg inhalation capsule 1 Cap  1 Cap BID (RT) Jayro Vásquez M.D.   1 Cap at 11/01/20 1852   • senna-docusate (PERICOLACE or SENOKOT S) 8.6-50 MG per tablet 2 Tab  2 Tab BID Artem Givens M.D.   2 Tab at 11/02/20 0618    And   • polyethylene glycol/lytes (MIRALAX) PACKET 1 Packet  1 Packet QDAY PRN Artem Givens M.D.        And   • magnesium hydroxide (MILK OF MAGNESIA) suspension 30 mL  30 mL QDAY PRN Artem Givens M.D.        And   • bisacodyl (DULCOLAX) suppository 10 mg  10 mg QDAY PRN Artem Givens M.D.       • Respiratory Therapy Consult   Continuous RT Artem Givens M.D.       • cloNIDine (CATAPRES) tablet 0.1 mg  0.1 mg Q6HRS PRN Artem Givens M.D.       • acetaminophen (TYLENOL) tablet 500 mg  500 mg Q6HRS PRN Artem Givens M.D.       • HYDROcodone/acetaminophen (NORCO)  MG per tablet 1 Tab  1 Tab Q6HRS PRN Artem Givens M.D.   1 Tab at 11/01/20 1336   • insulin regular (HumuLIN R,NovoLIN R) injection  1-6 Units Q6HRS Artem Givens M.D.   Stopped at 11/01/20 1800    And   • glucose 4 g chewable tablet 16 g  16 g Q15 MIN PRN Artem Givens M.D.        And   • dextrose 50% (D50W) injection 50 mL  50 mL Q15 MIN PRN Artem Givens M.D.       Last reviewed on 10/30/2020  9:54 AM by Vinny Garcia    Medications reviewed    Imaging reviewed      Impressions:  1. Strep bacteremia, source lower extremity cellulitis  2. Acute decompensated diastolic heart failure  3.  Bilateral lowe extremity cellulitis  4. Severe PAD  5. Paroxysmal Afib, rate controlled  6. Coronary artery disease, history of two-vessel bypass in 2017, RCA  not revascularized    Recommendations:  - Good output in last 24 hours, still volume overloaded. HC03 24. Continue current dose of Lasix 80 mg BID  - TTE with contrast done today, EF of 50-55%. RA pressure elevated to 40 mmHg. No gross vegetation. No indication for JIM for now as it's strep bacteremia, source is likely LE wounds.   - Rate controlled for Afib, continue Amiodarone 200 daily. Consider restarting Outpatient metoprolol if blood pressure permits. If no immediate plan for surgery, consider restarting anti coagulation (can start Lovenox therapeutic dose) now that his INR has been corrected.  - Antibiotics per ID       Discussed with primary physician.

## 2020-11-02 NOTE — DIETARY
NUTRITION SERVICES: BMI - Pt with BMI >40 (=Body mass index is 44.42 kg/m².), morbid obesity. Weight loss counseling not appropriate in acute care setting.  Will continue to f/u regarding T2DM education.  RECOMMEND - Referral to outpatient nutrition services for weight management after D/C.

## 2020-11-02 NOTE — PROGRESS NOTES
[facility] ICU Progress Note      Admit Date: 10/29/2020    Resident(s): Dr. Barraza  Attending: SHIVAM KAISER/ Dr. Rafita MD    Date & Time:   11/2/2020   7:57 AM       Patient ID:    Name:             Joshua Medrano   YOB: 1965  Age:                 54 y.o.  male   MRN:               [id number]    Diagnosis:  Severe sepsis secondary to severe lower extremity cellulitis  Acute kidney injury    ID:  Mr. Medrano is a 54 y.o. male w/ CAD/CABG 2019, Afib, COPD, DM, and PAD who presented 10/29/2020 with LLE cellulitis, strep sepsis, and severe leg pain. Cardiology, vascular surgery, orthopedic surgery and ID are on board. Patient was transferred to the ICU on 10/31 for worsening cardiac function, dropping sodium, increasing oxygen demands and hypotension in the setting of sepsis.    Interval Events:  No acute events overnight.  Patient denies any new or worsening pain. He is still indecisive about the treatment plan and surgery. Wants to discuss that route further with his brother  LRINEC score for nec fasc is 5. But this does not rule out nec fasc.  Off pressors  Continue lasix; dose increased per Cardiology recommendations.  Hold metoprolol.  Continue antibiotics per ID recommendations.  Was on warfarin for A fib, held now. INR elevated.  Pulses with doppler  Diabetic diet.  Ceballos in place.  Palliative consult placed    ROS:  Review of Systems   Constitutional: Positive for malaise/fatigue. Negative for chills and fever.   HENT: Negative for congestion and sore throat.    Eyes: Negative for blurred vision and photophobia.   Respiratory: Negative for sputum production and shortness of breath.    Cardiovascular: Positive for leg swelling. Negative for chest pain and palpitations.   Gastrointestinal: Negative for abdominal pain, nausea and vomiting.   Genitourinary: Negative for dysuria and urgency.   Neurological: Negative for dizziness and headaches.   Psychiatric/Behavioral: Negative for depression. The  patient is nervous/anxious.      VITALS:  Pulse: 85  Blood Pressure: 133/74       Temp  Av.7 °C (98 °F)  Min: 36.2 °C (97.2 °F)  Max: 36.8 °C (98.2 °F)         Physical Exam:  GEN: Obese male in no apparent distress. Alert and oriented.  HEENT: NC/AT.  CV:  Tachycardic, RR, no m/r/g, normal s1/s2.  RESP: CTAB, No wheezes or rales. Ronchi heard.  ABD: Soft, NT/ND, BS+  EXT: Edematous, erythematous weeping sores on bilateral lower extremities, severe tenderness to palpation. Erythema and swelling extends to upper thighs bilaterally. Delayed capillary refill.  NEURO: Alert and oriented x 3, no focal deficits.    Consultants:  Critical care  Cardiology  Vascular surgery  Orthopedic surgery  ID    Procedures:  None    Fluids:  Intake/Output       10/31/20 0700 - 20 0659 20 07 - 20 0659 20 07 - 20 0659      [id number] Total 7553-5549 9120-8507 Total 8352-6300 7472-7964 Total       Intake    P.O.  240  150 390  800  -- 800  --  -- --    P.O. 240 150 390 800 -- 800 -- -- --    I.V.  --  19.1 19.1  19.3  -- 19.3  --  -- --    Norepinephrine Volume -- 19.1 19.1 19.3 -- 19.3 -- -- --    Blood  --  -- --  313  -- 313  --  -- --    Volume (RELEASE FRESH FROZEN PLASMA (UNITS)) -- -- -- 313 -- 313 -- -- --    IV Piggyback  100  -- 100  --  -- --  --  -- --    Volume (mL) (clindamycin (CLEOCIN) IVPB premix 900 mg) 50 -- 50 -- -- -- -- -- --    Volume (mL) (DAPTOmycin 760 mg in NS 50 mL IVPB) 50 -- 50 -- -- -- -- -- --    Total Intake 340 169.1 509.1 1132.3 -- 1132.3 -- -- --       Output    Urine  650  1025 1675  3100  6384 9460  --  -- --    Number of Times Voided 1 x -- 1 x -- -- -- -- -- --    Urine Void (mL) 325 -- 325 -- -- -- -- -- --    Output (mL) (Urethral Catheter Temperature probe) 325 1025 1350 3100 6346 9403 -- -- --    Stool  --  -- --  --  -- --  --  -- --    Number of Times Stooled 0 x -- 0 x -- -- -- -- -- --    Total Output 650 1025 1675 3100 6325 9442 -- -- --        Net I/O     -310 -855.9 -1165.9 -1967.7 -6325 -8292.7 -- -- --        Weight: (!) 169.9 kg (374 lb 9 oz)(Dry weight, nothing on bed)  Recent Labs     10/31/20  2210 11/01/20  0420 11/02/20  0345   SODIUM 127* 129* 130*   POTASSIUM 4.5 4.6 4.1   CHLORIDE 95* 96 95*   CO2 * 20 24   BUN 55* 55* 47*   CREATININE 2.00* 1.93* 1.55*   MAGNESIUM  --  1.9 1.7   CALCIUM 8.3* 8.6 7.9*     Body mass index is 44.42 kg/m².    Respiratory:     Respiration: 20, Pulse Oximetry: 94 %    Chest Tube Drains:    Recent Labs     10/31/20  1411   ISTATAPH 7.320*   ISTATAPCO2 34.4   ISTATAPO2 73   ISTATATCO2 19*   TDLDFLM3UIO 93   ISTATARTHCO3 17.7   ISTATARTBE -7*   ISTATTEMP see below   ISTATFIO2 2   ISTATSPEC Arterial       HemoDynamics:  Pulse: 85 Blood Pressure: 133/74      GI/Nutrition:  Recent Labs     10/31/20  2210 11/01/20  0420 11/02/20  034   GLUCOSE 159* 127* 185*       Heme:  Recent Labs     10/31/20  0258 10/31/20  0456 11/01/20  0420 11/02/20  034   RBC 6.28* 6.30* 6.47* 6.42*   HEMOGLOBIN 18.8* 18.6* 18.8* 18.7*   HEMATOCRIT 57.5* 57.2* 57.9* 57.1*   PLATELETCT 109* 102* 100* 156*   PROTHROMBTM 39.1*  --  36.1* 33.8*   INR 3.86*  --  3.49* 3.21*       Infectious Disease:  Temp  Av.7 °C (98 °F)  Min: 36.2 °C (97.2 °F)  Max: 36.8 °C (98.2 °F)  Recent Labs     10/31/20  0456 11/01/20  0420 11/02/20  034   WBC 19.8* 21.9* 24.8*   NEUTSPOLYS 91.60* 96.60* 95.00*   LYMPHOCYTES 1.20* 0.90* 1.70*   MONOCYTES 3.90 1.70 1.70   EOSINOPHILS 0.40 0.90 0.00   BASOPHILS 0.60 0.00 0.00       Imaging:  DX-CHEST-LIMITED (1 VIEW)   Final Result      1.  Right internal jugular catheter is been placed and appears appropriately located      2.  Moderate pulmonary edema      3.  Enlarged cardiac silhouette      CT-EXTREMITY, LOWER W/O RIGHT   Final Result      1.  No discrete fluid collection is identified to suggest abscess.      2.  Diffuse subcutaneous edema and overlying skin thickening with venous varicosities.      3.   Atherosclerosis.      4.  Diffuse muscle atrophy.      CT-EXTREMITY, LOWER W/O LEFT   Final Result      1.  Left leg cellulitis without abscess or soft tissue gas      2.  Small vessel atherosclerotic plaque      3.  osteoarthritis of the left knee and left midfoot      4.  Muscular atrophy      EC-ECHOCARDIOGRAM COMPLETE W/O CONT   Final Result      DX-CHEST-PORTABLE (1 VIEW)   Final Result         1.  Pulmonary edema and/or infiltrates are identified, which are stable since the prior exam.   2.  Cardiomegaly      US-EXTREMITY ARTERY LOWER BILAT   Final Result      US-EXTREMITY VENOUS LOWER BILAT   Final Result      DX-TIBIA AND FIBULA RIGHT   Final Result         1.  No acute traumatic bony injury.   2.  Tricompartmental degenerative changes of the knee   3.  Atherosclerosis      DX-TIBIA AND FIBULA LEFT   Final Result   Addendum 1 of 1   Addendum: Subtle clustered punctate lucencies lateral to the ankle are    seen which could represent tiny foci of soft tissue gas.      These findings were discussed with the patient's clinician, ELIZABETH HILARIO,    on 10/30/2020 12:34 AM.      Final      DX-CHEST-PORTABLE (1 VIEW)   Final Result         1.  Interstitial pulmonary parenchymal prominence, compatible with interstitial edema and/or infiltrates.   2.  Cardiomegaly      EC-ECHOCARDIOGRAM LTD W/ CONT    (Results Pending)   IR-MIDLINE CATHETER INSERTION WO GUIDANCE > AGE 3    (Results Pending)         Meds:  Current Facility-Administered Medications   Medication Dose Frequency Provider Last Rate Last Admin   • magnesium sulfate IVPB premix 2 g  2 g Once Kady Barraza M.D.       • furosemide (LASIX) injection 80 mg  80 mg BID DIURETIC Quinton Parkinson M.D.   80 mg at 11/02/20 0618   • clindamycin (CLEOCIN) IVPB premix 900 mg  900 mg Q8HRS Feli Hanson M.D. 50 mL/hr at 11/02/20 0618 900 mg at 11/02/20 0618   • DAPTOmycin 760 mg in NS 50 mL IVPB  6 mg/kg Q24HR Feli Hanson M.D. 100 mL/hr at 11/01/20 1714 760  mg at 11/01/20 1714   • norepinephrine (Levophed) infusion 8 mg/250 mL (premix)  0-30 mcg/min Continuous Jodi Small M.D.   Stopped at 11/01/20 0905   • HYDROmorphone pf (DILAUDID) injection 0.5-1 mg  0.5-1 mg Q2HRS PRN Meng Seymour M.D.   1 mg at 11/02/20 0338   • albuterol inhaler 2 Puff  2 Puff Q6HRS PRN Jayro Vásquez M.D.   2 Puff at 11/01/20 1901   • amiodarone (Cordarone) tablet 200 mg  200 mg QAHUI Vásquez M.D.   200 mg at 11/02/20 0618   • ipratropium-albuterol (DUONEB) nebulizer solution  3 mL Q6HRS PRN (RT) Jayro Vásquez M.D.       • glycopyrrolate (Seebri) 15.6 mcg inhalation capsule 1 Cap  1 Cap BID (RT) Jayro Vásquez M.D.   1 Cap at 11/01/20 1852   • senna-docusate (PERICOLACE or SENOKOT S) 8.6-50 MG per tablet 2 Tab  2 Tab BID Artem Givens M.D.   2 Tab at 11/02/20 0618    And   • polyethylene glycol/lytes (MIRALAX) PACKET 1 Packet  1 Packet QDAY PRN Artem Givens M.D.        And   • magnesium hydroxide (MILK OF MAGNESIA) suspension 30 mL  30 mL QDAY PRN Artem Givens M.D.        And   • bisacodyl (DULCOLAX) suppository 10 mg  10 mg QDAY PRN Artem Givens M.D.       • Respiratory Therapy Consult   Continuous RT Artem Givens M.D.       • cloNIDine (CATAPRES) tablet 0.1 mg  0.1 mg Q6HRS PRN Artem Givens M.D.       • acetaminophen (TYLENOL) tablet 500 mg  500 mg Q6HRS PRN Artem Givens M.D.       • HYDROcodone/acetaminophen (NORCO)  MG per tablet 1 Tab  1 Tab Q6HRS PRN Artem Givens M.D.   1 Tab at 11/01/20 1336   • insulin regular (HumuLIN R,NovoLIN R) injection  1-6 Units Q6HRS Artem Givens M.D.   Stopped at 11/01/20 1800    And   • glucose 4 g chewable tablet 16 g  16 g Q15 MIN PRN Artem Givens M.D.        And   • dextrose 50% (D50W) injection 50 mL  50 mL Q15 MIN PRN Artem Givens M.D.       Last reviewed on 10/30/2020  9:54 AM by Marina Caruso, AmandeepT      Assessment and plan    * Cellulitis  Assessment & Plan  ? Necrotizing fasciitis  Continue antibiotics per ID recs;  appreciate recs  Vascular and ortho surgery on board  Pain management    Sepsis (HCC)  Assessment & Plan  Likely from infection in bilateral lower extremities  ? Necrotizing fasciitis  Orthopedic surgery and Vascular surgery along with ID on board; appreciate recs  Continue antibiotics  Continue diuretics  Follow blood cultures  Maintain MAP > 65  Will most likely need debridement vs amputation    High anion gap metabolic acidosis  Assessment & Plan  Likely from sepsis and poor perfusion    Supratherapeutic INR- (present on admission)  Assessment & Plan  At admission INR 4.46  Likely from sepsis  Hold warfarin  Reversal with FFP 11/1 for central line placement    Paroxysmal atrial fibrillation (HCC)- (present on admission)  Assessment & Plan  Controlled on metoprolol, amiodarone. [id number]: 4 points. HASBLED: 2 points.  On warfarin with supratherapeutic INR (4.46 10/30/2020); likely from sepsis  Plan:  Hold metoprolol since on pressors  Hold warfarin    LINSEY (acute kidney injury) (HCC)  Assessment & Plan  Likely from sepsis  Continue diuresis    Elevated LFTs  Assessment & Plan  Likely from sepsis    Polycythemia  Assessment & Plan  Chronic  Likely 2/2 chronic hypoxia due to COPD vs SHASHANK  OP follow up    Heart failure with preserved ejection fraction, borderline, class IV (HCC)- (present on admission)  Assessment & Plan  Continue diuresis  Hold metoprolol due to pressor support at this time    Type 2 diabetes mellitus with complication, without long-term current use of insulin (HCC)- (present on admission)  Assessment & Plan  Stable. Last A1c 6.6 (8/2019)  ISS  A1c  Hypoglycemia protocol    COPD (chronic obstructive pulmonary disease) ([facility])  Assessment & Plan  Stable  Continue home inhalers  Supplemental O2  RT protocol    Hyponatremia  Assessment & Plan  IVC dilated on ECHO  Continue diuresis  Monitor sodium      DISPO: ICU    CODE STATUS: FULL    Quality Measures:  Ceballos Catheter: Yes  DVT Prophylaxis: Elevated  INR from warfarin  Ulcer Prophylaxis: Not indicated  Antibiotics: Clindamycin and Daptomycin  Lines: THEO KENYON placed 11/1

## 2020-11-03 LAB
ANION GAP SERPL CALC-SCNC: 8 MMOL/L (ref 7–16)
BASOPHILS # BLD AUTO: 0.8 % (ref 0–1.8)
BASOPHILS # BLD: 0.2 K/UL (ref 0–0.12)
BUN SERPL-MCNC: 41 MG/DL (ref 8–22)
CALCIUM SERPL-MCNC: 8.2 MG/DL (ref 8.5–10.5)
CHLORIDE SERPL-SCNC: 88 MMOL/L (ref 96–112)
CO2 SERPL-SCNC: 30 MMOL/L (ref 20–33)
CREAT SERPL-MCNC: 1.16 MG/DL (ref 0.5–1.4)
EOSINOPHIL # BLD AUTO: 0.43 K/UL (ref 0–0.51)
EOSINOPHIL NFR BLD: 1.7 % (ref 0–6.9)
ERYTHROCYTE [DISTWIDTH] IN BLOOD BY AUTOMATED COUNT: 59.6 FL (ref 35.9–50)
GLUCOSE BLD-MCNC: 169 MG/DL (ref 65–99)
GLUCOSE BLD-MCNC: 197 MG/DL (ref 65–99)
GLUCOSE BLD-MCNC: 229 MG/DL (ref 65–99)
GLUCOSE BLD-MCNC: 231 MG/DL (ref 65–99)
GLUCOSE SERPL-MCNC: 192 MG/DL (ref 65–99)
HCT VFR BLD AUTO: 58.9 % (ref 42–52)
HGB BLD-MCNC: 19.2 G/DL (ref 14–18)
INR PPP: 1.93 (ref 0.87–1.13)
LV EJECT FRACT  99904: 50
LYMPHOCYTES # BLD AUTO: 0.43 K/UL (ref 1–4.8)
LYMPHOCYTES NFR BLD: 1.7 % (ref 22–41)
MAGNESIUM SERPL-MCNC: 1.7 MG/DL (ref 1.5–2.5)
MANUAL DIFF BLD: NORMAL
MCH RBC QN AUTO: 29 PG (ref 27–33)
MCHC RBC AUTO-ENTMCNC: 32.6 G/DL (ref 33.7–35.3)
MCV RBC AUTO: 89 FL (ref 81.4–97.8)
METAMYELOCYTES NFR BLD MANUAL: 2.5 %
MONOCYTES # BLD AUTO: 3.19 K/UL (ref 0–0.85)
MONOCYTES NFR BLD AUTO: 12.5 % (ref 0–13.4)
MORPHOLOGY BLD-IMP: NORMAL
NEUTROPHILS # BLD AUTO: 20.6 K/UL (ref 1.82–7.42)
NEUTROPHILS NFR BLD: 80.8 % (ref 44–72)
NRBC # BLD AUTO: 0 K/UL
NRBC BLD-RTO: 0 /100 WBC
PLATELET # BLD AUTO: 187 K/UL (ref 164–446)
PLATELET BLD QL SMEAR: NORMAL
PMV BLD AUTO: 10.7 FL (ref 9–12.9)
POTASSIUM SERPL-SCNC: 3.6 MMOL/L (ref 3.6–5.5)
PROTHROMBIN TIME: 22.6 SEC (ref 12–14.6)
RBC # BLD AUTO: 6.62 M/UL (ref 4.7–6.1)
RBC BLD AUTO: NORMAL
SODIUM SERPL-SCNC: 126 MMOL/L (ref 135–145)
WBC # BLD AUTO: 25.5 K/UL (ref 4.8–10.8)

## 2020-11-03 PROCEDURE — 700102 HCHG RX REV CODE 250 W/ 637 OVERRIDE(OP): Performed by: STUDENT IN AN ORGANIZED HEALTH CARE EDUCATION/TRAINING PROGRAM

## 2020-11-03 PROCEDURE — 700102 HCHG RX REV CODE 250 W/ 637 OVERRIDE(OP): Performed by: INTERNAL MEDICINE

## 2020-11-03 PROCEDURE — 99232 SBSQ HOSP IP/OBS MODERATE 35: CPT | Mod: GC | Performed by: STUDENT IN AN ORGANIZED HEALTH CARE EDUCATION/TRAINING PROGRAM

## 2020-11-03 PROCEDURE — 99233 SBSQ HOSP IP/OBS HIGH 50: CPT | Performed by: INTERNAL MEDICINE

## 2020-11-03 PROCEDURE — 93321 DOPPLER ECHO F-UP/LMTD STD: CPT | Mod: 26 | Performed by: INTERNAL MEDICINE

## 2020-11-03 PROCEDURE — A9270 NON-COVERED ITEM OR SERVICE: HCPCS | Performed by: STUDENT IN AN ORGANIZED HEALTH CARE EDUCATION/TRAINING PROGRAM

## 2020-11-03 PROCEDURE — A9270 NON-COVERED ITEM OR SERVICE: HCPCS | Performed by: INTERNAL MEDICINE

## 2020-11-03 PROCEDURE — 93325 DOPPLER ECHO COLOR FLOW MAPG: CPT | Mod: 26 | Performed by: INTERNAL MEDICINE

## 2020-11-03 PROCEDURE — 85027 COMPLETE CBC AUTOMATED: CPT

## 2020-11-03 PROCEDURE — 83735 ASSAY OF MAGNESIUM: CPT

## 2020-11-03 PROCEDURE — 770022 HCHG ROOM/CARE - ICU (200)

## 2020-11-03 PROCEDURE — 82962 GLUCOSE BLOOD TEST: CPT

## 2020-11-03 PROCEDURE — 80048 BASIC METABOLIC PNL TOTAL CA: CPT

## 2020-11-03 PROCEDURE — 700111 HCHG RX REV CODE 636 W/ 250 OVERRIDE (IP): Performed by: INTERNAL MEDICINE

## 2020-11-03 PROCEDURE — 700111 HCHG RX REV CODE 636 W/ 250 OVERRIDE (IP): Performed by: STUDENT IN AN ORGANIZED HEALTH CARE EDUCATION/TRAINING PROGRAM

## 2020-11-03 PROCEDURE — 85007 BL SMEAR W/DIFF WBC COUNT: CPT

## 2020-11-03 PROCEDURE — 85610 PROTHROMBIN TIME: CPT

## 2020-11-03 PROCEDURE — 700101 HCHG RX REV CODE 250: Performed by: STUDENT IN AN ORGANIZED HEALTH CARE EDUCATION/TRAINING PROGRAM

## 2020-11-03 PROCEDURE — 93308 TTE F-UP OR LMTD: CPT | Mod: 26 | Performed by: INTERNAL MEDICINE

## 2020-11-03 PROCEDURE — 94640 AIRWAY INHALATION TREATMENT: CPT

## 2020-11-03 RX ORDER — MAGNESIUM SULFATE HEPTAHYDRATE 40 MG/ML
2 INJECTION, SOLUTION INTRAVENOUS ONCE
Status: COMPLETED | OUTPATIENT
Start: 2020-11-03 | End: 2020-11-03

## 2020-11-03 RX ORDER — ADENOSINE 3 MG/ML
INJECTION, SOLUTION INTRAVENOUS
Status: DISCONTINUED
Start: 2020-11-03 | End: 2020-11-03

## 2020-11-03 RX ORDER — FUROSEMIDE 10 MG/ML
40 INJECTION INTRAMUSCULAR; INTRAVENOUS
Status: DISCONTINUED | OUTPATIENT
Start: 2020-11-03 | End: 2020-11-05

## 2020-11-03 RX ORDER — POTASSIUM CHLORIDE 20 MEQ/1
40 TABLET, EXTENDED RELEASE ORAL ONCE
Status: COMPLETED | OUTPATIENT
Start: 2020-11-03 | End: 2020-11-03

## 2020-11-03 RX ORDER — ROSUVASTATIN CALCIUM 20 MG/1
40 TABLET, COATED ORAL
Status: DISCONTINUED | OUTPATIENT
Start: 2020-11-03 | End: 2020-11-16 | Stop reason: HOSPADM

## 2020-11-03 RX ADMIN — ROSUVASTATIN CALCIUM 40 MG: 20 TABLET, FILM COATED ORAL at 22:22

## 2020-11-03 RX ADMIN — MAGNESIUM SULFATE IN WATER 2 G: 40 INJECTION, SOLUTION INTRAVENOUS at 07:15

## 2020-11-03 RX ADMIN — HYDROCODONE BITARTRATE AND ACETAMINOPHEN 1 TABLET: 10; 325 TABLET ORAL at 16:36

## 2020-11-03 RX ADMIN — HYDROCODONE BITARTRATE AND ACETAMINOPHEN 1 TABLET: 10; 325 TABLET ORAL at 09:12

## 2020-11-03 RX ADMIN — DOCUSATE SODIUM 50 MG AND SENNOSIDES 8.6 MG 2 TABLET: 8.6; 5 TABLET, FILM COATED ORAL at 05:21

## 2020-11-03 RX ADMIN — LINEZOLID 600 MG: 600 TABLET, FILM COATED ORAL at 05:21

## 2020-11-03 RX ADMIN — GLYCOPYRROLATE 1 CAPSULE: 15.6 CAPSULE RESPIRATORY (INHALATION) at 18:19

## 2020-11-03 RX ADMIN — INSULIN HUMAN 2 UNITS: 100 INJECTION, SOLUTION PARENTERAL at 05:34

## 2020-11-03 RX ADMIN — INSULIN HUMAN 2 UNITS: 100 INJECTION, SOLUTION PARENTERAL at 16:39

## 2020-11-03 RX ADMIN — FUROSEMIDE 80 MG: 10 INJECTION, SOLUTION INTRAMUSCULAR; INTRAVENOUS at 05:21

## 2020-11-03 RX ADMIN — LINEZOLID 600 MG: 600 TABLET, FILM COATED ORAL at 16:37

## 2020-11-03 RX ADMIN — HYDROMORPHONE HYDROCHLORIDE 1 MG: 1 INJECTION, SOLUTION INTRAMUSCULAR; INTRAVENOUS; SUBCUTANEOUS at 13:15

## 2020-11-03 RX ADMIN — FUROSEMIDE 40 MG: 10 INJECTION, SOLUTION INTRAMUSCULAR; INTRAVENOUS at 16:00

## 2020-11-03 RX ADMIN — CEFAZOLIN SODIUM 2 G: 2 INJECTION, SOLUTION INTRAVENOUS at 05:46

## 2020-11-03 RX ADMIN — DOCUSATE SODIUM 50 MG AND SENNOSIDES 8.6 MG 2 TABLET: 8.6; 5 TABLET, FILM COATED ORAL at 16:36

## 2020-11-03 RX ADMIN — GLYCOPYRROLATE 1 CAPSULE: 15.6 CAPSULE RESPIRATORY (INHALATION) at 11:36

## 2020-11-03 RX ADMIN — POTASSIUM CHLORIDE 40 MEQ: 1500 TABLET, EXTENDED RELEASE ORAL at 07:15

## 2020-11-03 RX ADMIN — INSULIN HUMAN 1 UNITS: 100 INJECTION, SOLUTION PARENTERAL at 11:41

## 2020-11-03 RX ADMIN — CEFAZOLIN SODIUM 2 G: 2 INJECTION, SOLUTION INTRAVENOUS at 22:22

## 2020-11-03 RX ADMIN — HYDROMORPHONE HYDROCHLORIDE 0.5 MG: 1 INJECTION, SOLUTION INTRAMUSCULAR; INTRAVENOUS; SUBCUTANEOUS at 04:16

## 2020-11-03 RX ADMIN — POLYETHYLENE GLYCOL 3350 1 PACKET: 17 POWDER, FOR SOLUTION ORAL at 10:38

## 2020-11-03 RX ADMIN — CEFAZOLIN SODIUM 2 G: 2 INJECTION, SOLUTION INTRAVENOUS at 15:03

## 2020-11-03 RX ADMIN — AMIODARONE HYDROCHLORIDE 200 MG: 200 TABLET ORAL at 05:21

## 2020-11-03 ASSESSMENT — ENCOUNTER SYMPTOMS
COUGH: 0
SORE THROAT: 0
FALLS: 0
NERVOUS/ANXIOUS: 1
VOMITING: 0
CHILLS: 0
NERVOUS/ANXIOUS: 0
SHORTNESS OF BREATH: 0
WEAKNESS: 1
FEVER: 0
DIARRHEA: 0
SENSORY CHANGE: 0
POLYDIPSIA: 0
PALPITATIONS: 0
BRUISES/BLEEDS EASILY: 0
CONSTIPATION: 0
ABDOMINAL PAIN: 0
NECK PAIN: 0
MYALGIAS: 1
BACK PAIN: 0
CONSTIPATION: 1
BLURRED VISION: 0
SPUTUM PRODUCTION: 0
NAUSEA: 0
HEADACHES: 0
EYE PAIN: 0
FOCAL WEAKNESS: 0

## 2020-11-03 ASSESSMENT — PAIN DESCRIPTION - PAIN TYPE
TYPE: CHRONIC PAIN

## 2020-11-03 NOTE — PROGRESS NOTES
Critical Care Progress Note    Date of admission  10/29/2020    Chief Complaint  54 y.o. male who presented 10/29/2020 with lower extremity infection bilateral that appears to be nec fascitis.  PMH cabg 2019, pafib, heart failure with normal ef, rosanna and copd.  He had an episode of hypoxia this am but now on 2 L and sating adequately. sbp 90-100s.  BNP elevated. CHest xray with edema.  He has been receiving fluids for sepsis. ECHO appears to have reduced ef.  He is alert and oriented, rr mid 20s.  Erythema and swelling to upper thighs bilateral. He is on antibiotics.  Patient currently wants to talk to his brother regarding continued medical therapy and defers ICU transfer currently.  I discussed goals of care and he would like to discuss them with his brother, currently he would rather not be intubated but is not definitive on code status.  He also had hyponatremia to 123.  I have ordered a jenkins catheter and lasix. Lactic acid is normal.  On linezolid, cefepime and flagyl.  Vascular and orthopedics have been consulted and likely will need amputation.  Thrombocytopenia likely from sepsis.      Hospital Course  10/31 admitted to ICU  10/31 CT legs bilaterally negative for abscesses or air.   11/2 O2 requirements increasing, now on 10 L, norepinephrine weaned off, signs of persisting sepsis from his cellulitis in LE, WBC 24.9, still refusing surgery, Clinda/Dapto  11/3 patient now willing to undergo surgery, orthopedic follow-up pending, oxygenation improved, patient off pressors    Interval Problem Update  Reviewed last 24 hour events    A&O x4  SR 70s  SBp 130s  UO awesome  I/Os neg 10.6L / 24 hrs  LE pain - norco/dilaudid  Taking PO  Tm 99.5  Pt now ok w/Surgery  4 lpm  INR <2  Ancef/Zyvox  Lasix 80 twice daily  Blood sugar running around 200    Change lasix 40 Bid  Monitor for the need to change insulin 180 continuous infusion protocol, may need it postop  Call Ortho for follow-up, patient now willing to have  surgical therapy  IS every hour while awake for pulmonary toilet  Resume some home meds as clinically appropriate  Keep in ICU, high risk for deterioration and likely need to come back in a ventilator postop     YESTERDAY  A&O x4  Pain in LE, edema worse on L, poor pulses  Moves all 4  SR PVCs  SBp 130s  UO good  Renal function improved  T-max 99.5, WBC increased to 24.8  No new positive cultures  10 LPM NC  Refuses IS  CXR wet  Cards following  Vasc following - pt refusing surgery - amputation  Warfarin at home - INR > 3  Dapto/Clinda  Weaned off pressors      Palliative care eval  Encourage IS  HF NC if needed  Mg repletion    Case reviewed with ID, de-escalating antibiotics to Ancef  Oxygen requirements increased, work of breathing acceptable  Encouraging pulmonary toilet  Unable to mobilize patient  Blood pressure okay, remains off norepinephrine    Review of Systems  Review of Systems   Constitutional: Positive for malaise/fatigue (Persisting/slight better?). Negative for fever.   HENT: Negative for congestion and sore throat.    Respiratory: Negative for cough, sputum production and shortness of breath.    Cardiovascular: Positive for leg swelling (No change, worse on the left). Negative for chest pain.   Gastrointestinal: Negative for abdominal pain, diarrhea, nausea and vomiting.   Genitourinary: Negative for dysuria and urgency.   Musculoskeletal: Positive for myalgias (Worse in the left lower extremity, it is in both lower extremities). Negative for back pain.        Leg pain bilaterally   Skin: Negative for rash.   Neurological: Positive for weakness (Diffuse). Negative for focal weakness and headaches.   Psychiatric/Behavioral: The patient is not nervous/anxious.    All other systems reviewed and are negative.       Vital Signs for last 24 hours   Temp:  [37.2 °C (99 °F)-37.5 °C (99.5 °F)] 37.2 °C (99 °F)  Pulse:  [72-82] 76  Resp:  [16-30] 16  BP: ()/() 125/81  SpO2:  [92 %-97 %] 96  %    Hemodynamic parameters for last 24 hours       Respiratory Information for the last 24 hours       Physical Exam   Physical Exam  Vitals signs reviewed.   Constitutional:       General: He is not in acute distress.     Appearance: He is morbidly obese. He is not ill-appearing, toxic-appearing or diaphoretic.      Interventions: Face mask in place.   HENT:      Head: Normocephalic and atraumatic.      Mouth/Throat:      Mouth: Mucous membranes are moist.   Eyes:      General: No scleral icterus.     Extraocular Movements: Extraocular movements intact.      Pupils: Pupils are equal, round, and reactive to light.   Neck:      Musculoskeletal: Neck supple.      Vascular: No JVD.      Trachea: Phonation normal.   Cardiovascular:      Rate and Rhythm: Normal rate and regular rhythm.      Pulses: Normal pulses.      Heart sounds: Normal heart sounds. No murmur.      Comments: SR w/PVCs  Pulmonary:      Effort: Pulmonary effort is normal. No respiratory distress.      Breath sounds: Normal breath sounds. No wheezing or rales.   Abdominal:      General: Abdomen is protuberant. Bowel sounds are normal. There is no distension.      Palpations: Abdomen is soft.      Tenderness: There is no abdominal tenderness. There is no right CVA tenderness, left CVA tenderness or guarding.   Musculoskeletal:         General: No swelling.      Comments: Bilateral lower leg extremities - erythema, edematous, tender to touch. Kerlix dressing wrapped around the legs, worse left lower extremity to mid thigh versus right   Skin:     General: Skin is warm and dry.      Capillary Refill: Capillary refill takes 2 to 3 seconds.      Coloration: Skin is not cyanotic or jaundiced.      Findings: Ecchymosis, erythema (Slight worse in the left lower extremity) and wound (Bilateral lower extremities) present.      Nails: There is no clubbing.     Neurological:      General: No focal deficit present.      Mental Status: He is alert and oriented to  person, place, and time.      Cranial Nerves: No cranial nerve deficit.      Motor: Weakness (Nonfocal/diffuse) present.      Comments: Follows well   Psychiatric:         Attention and Perception: Attention normal.         Speech: Speech normal.         Behavior: Behavior is not agitated. Behavior is cooperative.         Thought Content: Thought content normal.         Cognition and Memory: Cognition normal.         Medications  Current Facility-Administered Medications   Medication Dose Route Frequency Provider Last Rate Last Admin   • furosemide (LASIX) injection 40 mg  40 mg Intravenous BID DIURETIC Adrianna Fabian M.D.   40 mg at 11/03/20 1600   • MD Alert...ICU Electrolyte Replacement per Pharmacy   Other PHARMACY TO DOSE Silvio Eller M.D.       • rosuvastatin (CRESTOR) tablet 40 mg  40 mg Oral QHS Silvio Eller M.D.       • ceFAZolin in dextrose (ANCEF) IVPB premix 2 g  2 g Intravenous Q8HRS Carol Barber M.D.   Stopped at 11/03/20 1533   • linezolid (ZYVOX) tablet 600 mg  600 mg Oral Q12HRS Carol Barber M.D.   600 mg at 11/03/20 1637   • norepinephrine (Levophed) infusion 8 mg/250 mL (premix)  0-30 mcg/min Intravenous Continuous Jodi Small M.D.   Stopped at 11/01/20 0905   • HYDROmorphone pf (DILAUDID) injection 0.5-1 mg  0.5-1 mg Intravenous Q2HRS PRN Meng Seymour M.D.   1 mg at 11/03/20 1315   • albuterol inhaler 2 Puff  2 Puff Inhalation Q6HRS PRN Jayro Vásquez M.D.   2 Puff at 11/01/20 1901   • amiodarone (Cordarone) tablet 200 mg  200 mg Oral QAM Jayro Vásquez M.D.   200 mg at 11/03/20 0521   • ipratropium-albuterol (DUONEB) nebulizer solution  3 mL Nebulization Q6HRS PRN (RT) Jayro Vásquez M.D.       • glycopyrrolate (Seebri) 15.6 mcg inhalation capsule 1 Cap  1 Cap Inhalation BID (RT) Jayro Vásquez M.D.   1 Cap at 11/03/20 1819   • senna-docusate (PERICOLACE or SENOKOT S) 8.6-50 MG per tablet 2 Tab  2 Tab Oral BID Artem Givens M.D.   2 Tab at 11/03/20 1636    And    • polyethylene glycol/lytes (MIRALAX) PACKET 1 Packet  1 Packet Oral QDAY PRN Artem Givens M.D.   1 Packet at 11/03/20 1038    And   • magnesium hydroxide (MILK OF MAGNESIA) suspension 30 mL  30 mL Oral QDAY PRN Artem Givens M.D.        And   • bisacodyl (DULCOLAX) suppository 10 mg  10 mg Rectal QDAY PRN Artem Givens M.D.       • Respiratory Therapy Consult   Nebulization Continuous RT Artem Givens M.D.       • cloNIDine (CATAPRES) tablet 0.1 mg  0.1 mg Oral Q6HRS PRN Artem Givens M.D.       • acetaminophen (TYLENOL) tablet 500 mg  500 mg Oral Q6HRS PRN Artem Givens M.D.       • HYDROcodone/acetaminophen (NORCO)  MG per tablet 1 Tab  1 Tab Oral Q6HRS PRN Artem Givens M.D.   1 Tab at 11/03/20 1636   • insulin regular (HumuLIN R,NovoLIN R) injection  1-6 Units Subcutaneous Q6HRS Artem Givens M.D.   2 Units at 11/03/20 1639    And   • glucose 4 g chewable tablet 16 g  16 g Oral Q15 MIN PRN Artem Givens M.D.        And   • dextrose 50% (D50W) injection 50 mL  50 mL Intravenous Q15 MIN PRN Artem Givens M.D.           Fluids    Intake/Output Summary (Last 24 hours) at 11/3/2020 2032  Last data filed at 11/3/2020 1800  Gross per 24 hour   Intake 1450 ml   Output 8000 ml   Net -6550 ml       Laboratory      Recent Labs     11/01/20 0420   CPKTOTAL 156*     Recent Labs     11/01/20 0420 11/02/20  0345 11/03/20  0445   SODIUM 129* 130* 126*   POTASSIUM 4.6 4.1 3.6   CHLORIDE 96 95* 88*   CO2 20 24 30   BUN 55* 47* 41*   CREATININE 1.93* 1.55* 1.16   MAGNESIUM 1.9 1.7 1.7   CALCIUM 8.6 7.9* 8.2*     Recent Labs     11/01/20 0420 11/02/20  0345 11/03/20  0445   GLUCOSE 127* 185* 192*     Recent Labs     11/01/20  0420 11/02/20  0345 11/03/20  0445   WBC 21.9* 24.8* 25.5*   NEUTSPOLYS 96.60* 95.00* 80.80*   LYMPHOCYTES 0.90* 1.70* 1.70*   MONOCYTES 1.70 1.70 12.50   EOSINOPHILS 0.90 0.00 1.70   BASOPHILS 0.00 0.00 0.80     Recent Labs     11/01/20  0420 11/02/20  0345 11/03/20  0445   RBC 6.47* 6.42* 6.62*    HEMOGLOBIN 18.8* 18.7* 19.2*   HEMATOCRIT 57.9* 57.1* 58.9*   PLATELETCT 100* 156* 187   PROTHROMBTM 36.1* 33.8* 22.6*   INR 3.49* 3.21* 1.93*       Imaging  X-Ray:  I have personally reviewed the images and compared with prior images.  CT:    Reviewed      CT leg : IMPRESSION:  1.  No discrete fluid collection is identified to suggest abscess.  2.  Diffuse subcutaneous edema and overlying skin thickening with venous varicosities.  3.  Atherosclerosis.  4.  Diffuse muscle atrophy.    Assessment/Plan  * Cellulitis  Assessment & Plan  Severe bilateral leg cellulitis. Initial concern for necrotizing fascitis  CT legs bilaterally did not show any evidence of fluid collection/abscess or soft tissue gas.   His erythema not appear worsening compared to yesterday/edema worse  Pt is on clindamycin and daptomycin, ABX per ID  Need to reduce his leg swelling as this is not helping, elevation  Lasix 80 BID.   Pt still refusing surgery at this time. Will manage conservatively for now, prognosis poor    Dr Cole had a very long discussion with him and daughter about his critical condition. He explained about his severe cellulitis that could potentially get worsened if not treated appropriately. All questions were answered. He also explained about our limited visitation hours due to COVID pandemic. Family didn't seem to support our visitation hours. He offered an apology. He did his best to explain to them about his critical condition and visitation hours. His daughter seems to understand the situation    Right-sided heart failure (HCC)  Assessment & Plan  Forced diuresis when clinically appropriate  SHASHANK?  Strongly suspected    Sepsis (HCC)  Assessment & Plan  Sepsis due to lower ext infection Likely nec fasc clinically but CT negative for usual abnormalities, slightly worse on left lower extremity on exam    Ortho and vascular consulted, patient now agreeable to proceed with amputation that is recommended with left lower  extremity AKA, call back out to Ortho  Cellulitis clinically perhaps worse in the left lower extremity certainly not resolving quickly in both lower extremities  Pressors to support MAP >65 and SBP >90, norepinephrine infusion as needed, off for now  Diuresis as tolerated when clinically appropriate  Antibiotics, ID consult reviewed, deescalated to Ancef/linezolid  Serial cultures wound/blood as needed    High anion gap metabolic acidosis  Assessment & Plan  Likely from poor perfusion  Improved    Supratherapeutic INR- (present on admission)  Assessment & Plan  Likely from sepsis  Continue to monitor  Transfuse/vitamin K as clinically prudent per protocols    Paroxysmal atrial fibrillation (HCC)- (present on admission)  Assessment & Plan  Cardiac monitoring  On amiodarone and metoprolol, hold metoprolol for low blood pressure-treat with dig if needed  Continue to optimize electrolytes  Anticoagulate as clinically appropriate, holding since he is going to the OR    LINSEY (acute kidney injury) ([facility])  Assessment & Plan  Improved  Acute kidney injury 2/2 likely ATN due to sepsis  Lasix 80 BID, goal negative fluid balance as tolerated  Monitor UOP closely, strict I/Os      Elevated LFTs  Assessment & Plan  Likely from poor perfusion  Serial CMP  Avoid hepatotoxins    Polycythemia  Assessment & Plan  Serial CBC  O2  Eval for SHASHANK when clinically appropriate  Phlebotomy unlikely necessary now, patient going for probable significant amputation    Elevated troponin- (present on admission)  Assessment & Plan  Likely type 2 MI from poor perfusion from sepsis, heart failure  ECHO noted, repeat pending  Monitor    Heart failure with preserved ejection fraction, borderline, class IV ([facility])- (present on admission)  Assessment & Plan  Prior hx, current echo appears reduced ef?  Diuresis as tolerated  On metoprolol      Type 2 diabetes mellitus with complication, without long-term current use of insulin ([facility])- (present on  admission)  Assessment & Plan  Continue SSI, consider Lantus however suspect he may be going to the OR soon, hold for now  Monitor for the need for continuous insulin infusion, may need it postop  Hypoglycemia protocols  A1c 7.8    COPD (chronic obstructive pulmonary disease) ([facility])  Assessment & Plan  Not in exacerbation  duonebs prn  Albuterol prn  On seebri bid    Hyponatremia  Assessment & Plan  Persisting, mildly worse  Reassess volume status daily  Lasix/fluid restriction as tolerated  Low EF  Continue serial BMP       VTE:  Heparin  Ulcer: Not Indicated  Lines: Central Line  Ongoing indication addressed and Ceballos Catheter  Ongoing indication addressed    I have performed a physical exam and reviewed and updated ROS and Plan today (11/3/2020). In review of yesterday's note (11/2/2020), there are no changes except as documented above.     Discussed patient condition and risk of morbidity and/or mortality with RN, RT, Pharmacy, [employer] [facility] resident, Charge nurse / hot rounds, Patient and orthopedics

## 2020-11-03 NOTE — WOUND TEAM
Wound consult received for ongoing wound care, LPS is not following patient at this time. Wound care will see tomorrow for dressing change to BLE's and implement POC. Patient refusing surgery at this time per ortho, palliative has been consulted.

## 2020-11-03 NOTE — CONSULTS
"Reason for PC Consult: Advance Care Planning    Consulted by: Dr.Kavitha Barraza    Assessment:  General: Pt 54 yr male admitted with 10/29/2020 with lower extremity infection bilateral that appears to be necrotizing fascitis and septic. Transferred to ICU 11/1.  He is alert and oriented.  Erythema and swelling to upper thighs bilateral with severe cellulitis, ID consulted spectrum antibiotics ordered. Vascular and orthopedics have been consulted and likely will need amputation of his Left leg through knee amputation per surgery consult. Per surgery the right leg appears more chronic, but Pt is at high risk for AKA on that side as well.  PMH cabg 2019, afib, heart failure, SHASHANK and COPD. Severe PAD, DM2, Ongoing tobacco use, Severe obesity     Social: Pt lives with wife Lisa of 35 yrs and has a service dog Tad. Pt has two grown children Hollie and Ad. Pt stated that he is the third youngest of 10 brothers and sisters.     Consults: Vascular surgery, Cardiology, Infectious Diseases     Dyspnea:No   Last BM: (pta)-    Pain: Yes-    Depression: Yes- Pt very tearful and feels \"alone\"  Dementia: No     Spiritual:  Is Zoroastrianism or spirituality important for coping with this illness? Yes-    Has a  or spiritual provider visit been requested? No    Palliative Performance Scale: 40%    Advance Directive: None-    DPOA: Elected his Nephew Abdoulaye Medrano [phone]  POLST: No-      Code Status: Full-      Outcome: Consulted with BS RN and MD. Introduced self and role of Palliative care. Pt remembers speaking with a Palliative care MD a few years back. Pt was having a fair bit of pain and had received pain medications prior to PC RN speaking with pt. PC RN explored what brought pt into hospital and pt stated, \"my legs got really bad.\" Joshua said that his wife was changing his dressings on his legs for over a month. Pt was able to state that he made the decision to go through with the recommendations of surgery, " "understanding that he will need for his left leg to be amputated. PC RN talked with pt about his fears. Joshua stated, \"I still want to live and want to see if this helps\" Pt expressed he feels alone, but did comment that he feels better having his nephew to help with his choices.     Explored pt's values, beliefs and preferences in order to identify GOC. PC RN explains future care going through with surgery with left leg and making sure right leg is healing or anticipating if he will need another surgery in the future.  Pt was tearful and stated \"I do not want to give up.\" Pt commented, \" I have had many issues with my heart and the Doctors have always fixed me\". RN reviewed with pt his current health status, and discussed code status in detail including mechanical ventilation and what resuscitation looks like.  Pt stated that he wants to continue to be a Full code. RN addressed AD and pt selected his nephew Abdoulaye Medrano to be his Healthcare Agent and choose #2, # 3,  and # 5. Pt did state he is Yarsani but at this time is declining spiritual visit with the .     Active listening, reflection and validation and normalization utilized throughout this encounter. All questions answered and PC information given.       Updated: Bedside RN and MD     Plan: Continue to follow for assistance in GOC.     Thank you for allowing Palliative Care to participate in this patient's care. Please feel free to call [phone] with any questions or concerns.      "

## 2020-11-03 NOTE — PROGRESS NOTES
Infectious Disease Progress Note    Author: Carol Barber M.D. Date & Time of service: 11/3/2020  1:49 PM    Chief Complaint:  Lower extremity cellulitis and necrotic wounds       Interval History:  54-year-old male with known coronary artery disease, atrial fibrillation on chronic anticoagulation, diabetes, who was admitted on 10/29/2020 due to worsening bilateral lower extremity pain, swelling, erythema and necrotic wounds  10/31 AF WBC 22 increased O2 but states less SOB Remains with tender and swollen BLE left greater than right-sloughing eschars   AF WBC 21.9 transferred to ICU-down to nasal cannula- BLE edema improved somewhat. Denies SE abx-Vascular note-patient declining surgery   AF, O2 10 L, increased, WBC 24.8, dressing change today by nurses, extremely tender to palpation particularly right lower leg.      Review of Systems:  Review of Systems   Constitutional: Positive for malaise/fatigue. Negative for fever.   Gastrointestinal: Negative for abdominal pain, constipation, diarrhea, nausea and vomiting.   Musculoskeletal: Positive for joint pain and myalgias.       Hemodynamics:  Temp (24hrs), Av.4 °C (99.4 °F), Min:37.4 °C (99.3 °F), Max:37.5 °C (99.5 °F)  Temperature: 37.5 °C (99.5 °F)  Pulse  Av.7  Min: 65  Max: 103   Blood Pressure: (!) 99/53       Physical Exam:  Physical Exam  Constitutional:       Appearance: He is obese. He is ill-appearing.   Cardiovascular:      Rate and Rhythm: Normal rate and regular rhythm.      Heart sounds: Normal heart sounds.   Pulmonary:      Effort: Pulmonary effort is normal.      Breath sounds: Normal breath sounds.      Comments: Creased breath sounds bilateral  Abdominal:      General: There is distension.      Palpations: Abdomen is soft.      Tenderness: There is no abdominal tenderness.   Musculoskeletal:         General: Swelling and tenderness present.      Right lower leg: Edema present.      Left lower leg: Edema present.    Comments: Right lower extremity with edema, erythema, tenderness necrosis and weeping lesions.  Left leg with significant edema, erythema from hip to toes, warmth, tenderness and weeping lesions   Skin:     General: Skin is warm and dry.      Findings: Rash present.   Neurological:      General: No focal deficit present.      Mental Status: He is alert and oriented to person, place, and time.      Comments: Somnolent   Psychiatric:         Mood and Affect: Mood normal.         Behavior: Behavior normal.         Meds:    Current Facility-Administered Medications:   •  furosemide  •  MD Alert...Adult ICU Electrolyte Replacement per Pharmacy  •  ceFAZolin  •  linezolid  •  norepinephrine (Levophed) infusion  •  HYDROmorphone  •  albuterol  •  amiodarone  •  ipratropium-albuterol  •  glycopyrrolate  •  senna-docusate **AND** polyethylene glycol/lytes **AND** magnesium hydroxide **AND** bisacodyl  •  Respiratory Therapy Consult  •  cloNIDine  •  acetaminophen  •  HYDROcodone/acetaminophen  •  insulin regular **AND** POC Blood Glucose **AND** NOTIFY MD and PharmD **AND** glucose **AND** dextrose 50%    Labs:  Recent Labs     11/01/20 0420 11/02/20 0345 11/03/20  0445   WBC 21.9* 24.8* 25.5*   RBC 6.47* 6.42* 6.62*   HEMOGLOBIN 18.8* 18.7* 19.2*   HEMATOCRIT 57.9* 57.1* 58.9*   MCV 89.5 88.9 89.0   MCH 29.1 29.1 29.0   RDW 60.2* 59.7* 59.6*   PLATELETCT 100* 156* 187   MPV 11.2 11.1 10.7   NEUTSPOLYS 96.60* 95.00* 80.80*   LYMPHOCYTES 0.90* 1.70* 1.70*   MONOCYTES 1.70 1.70 12.50   EOSINOPHILS 0.90 0.00 1.70   BASOPHILS 0.00 0.00 0.80   RBCMORPHOLO Present  --  Normal     Recent Labs     11/01/20 0420 11/02/20 0345 11/03/20  0445   SODIUM 129* 130* 126*   POTASSIUM 4.6 4.1 3.6   CHLORIDE 96 95* 88*   CO2 20 24 30   GLUCOSE 127* 185* 192*   BUN 55* 47* 41*   CPKTOTAL 156*  --   --      Recent Labs     11/01/20  0420 11/02/20  0345 11/03/20  0445   CREATININE 1.93* 1.55* 1.16       Imaging:  Ct-extremity, Lower W/o  Right    Result Date: 10/31/2020  10/31/2020 3:13 PM HISTORY/REASON FOR EXAM:  Lower leg swelling/redness, cellulitis suspected. TECHNIQUE/EXAM DESCRIPTION AND NUMBER OF VIEWS: CT scan of the RIGHT lower extremity without contrast and including reconstructions. Thin-section noncontrast helical images were obtained. Coronal and sagittal reconstructions were generated from the axial images. Up to date radiation dose reduction adjustments have been utilized to meet ALARA standards for radiation dose reduction. COMPARISON: X-ray tibia and fibula 10/29/2020 FINDINGS: There is joint space narrowing with sclerosis and osteophyte formation in the lateral compartment of the knee. No definite bony destruction is identified. There is marked edema in the subcutaneous tissues of the right lower leg with slight thickening of the overlying skin. There are varicosities. There is arterial calcification. No well-defined fluid collection is identified to suggest abscess. There is diffuse muscle atrophy.     1.  No discrete fluid collection is identified to suggest abscess. 2.  Diffuse subcutaneous edema and overlying skin thickening with venous varicosities. 3.  Atherosclerosis. 4.  Diffuse muscle atrophy.    Ct-extremity, Lower W/o Left    Result Date: 10/31/2020  10/31/2020 2:58 PM HISTORY/REASON FOR EXAM:  Lower leg swelling/redness, cellulitis suspected; Bilateral. TECHNIQUE/EXAM DESCRIPTION AND NUMBER OF VIEWS: CT scan of the LEFT lower extremity without contrast and including reconstructions. Thin-section noncontrast helical images were obtained. Coronal and sagittal reconstructions were generated from the axial images. Up to date radiation dose reduction adjustments have been utilized to meet ALARA standards for radiation dose reduction. COMPARISON: None. FINDINGS: Distal femur appears normal There is osteoarthritis of the left knee joint. Tibia and fibula are intact. There is no soft tissue gas. There is extensive  atherosclerotic plaque There is diffuse muscular atrophy There is cutaneous and subcutaneous edema consistent with cellulitis. There are no discrete fluid collection. There is osteoarthritis of the midfoot.     1.  Left leg cellulitis without abscess or soft tissue gas 2.  Small vessel atherosclerotic plaque 3.  osteoarthritis of the left knee and left midfoot 4.  Muscular atrophy    Dx-chest-limited (1 View)    Result Date: 11/1/2020 11/1/2020 4:42 PM HISTORY/REASON FOR EXAM:  CVL placement. Central line placement TECHNIQUE/EXAM DESCRIPTION AND NUMBER OF VIEWS: Single AP view of the chest. COMPARISON:  1 view chest 10/31/2020 FINDINGS: Right internal jugular catheter has been placed. The tip projects appropriately over the superior vena cava. LUNGS: There are pulmonary interstitial and alveolar densities that are consistent with edema. HEART and MEDIASTINUM: enlarged. There has been previous sternotomy. Pleura: There are no pleural effusion or pneumothoraces. Osseous structures: No significant bony abnormality.     1.  Right internal jugular catheter is been placed and appears appropriately located 2.  Moderate pulmonary edema 3.  Enlarged cardiac silhouette    Dx-chest-portable (1 View)    Result Date: 10/31/2020    10/31/2020 6:58 AM HISTORY/REASON FOR EXAM: Pleural Effusion TECHNIQUE/EXAM DESCRIPTION:  Single AP view of the chest. COMPARISON: October 29, 2020 FINDINGS: Overlying cardiac leads are present. Cardiomegaly is observed.  Postsurgical changes of sternotomy are noted. The mediastinal contour appears within normal limits.  The central  pulmonary vasculature appears prominent and indistinct. The lungs appear well expanded bilaterally.  Diffuse scattered hazy pulmonary parenchymal opacities are seen. No significant pleural effusions are identified. The bony structures appear age-appropriate.     1.  Pulmonary edema and/or infiltrates are identified, which are stable since the prior exam. 2.   Cardiomegaly    Dx-chest-portable (1 View)    Result Date: 10/30/2020    10/29/2020 11:32 PM HISTORY/REASON FOR EXAM: Shortness of Breath TECHNIQUE/EXAM DESCRIPTION:  Single AP view of the chest. COMPARISON: September 10, 2019 FINDINGS: Overlying cardiac leads are present. Cardiomegaly is observed.  Postsurgical changes of sternotomy are noted. The mediastinal contour appears within normal limits.  The central  pulmonary vasculature appears prominent and indistinct. The lungs appear well expanded bilaterally.  Hazy interstitial bilateral pulmonary opacities are seen. No significant pleural effusions are identified. The bony structures appear age-appropriate.     1.  Interstitial pulmonary parenchymal prominence, compatible with interstitial edema and/or infiltrates. 2.  Cardiomegaly    Dx-tibia And Fibula Left    Addendum Date: 10/30/2020    Addendum: Subtle clustered punctate lucencies lateral to the ankle are seen which could represent tiny foci of soft tissue gas. These findings were discussed with the patient's clinician, ELIZABETH HILARIO, on 10/30/2020 12:34 AM.    Result Date: 10/30/2020  10/29/2020 11:32 PM HISTORY/REASON FOR EXAM: Atraumatic Pain/Swelling/Deformity TECHNIQUE/EXAM DESCRIPTION:  AP and lateral views of the LEFT tibia and fibula. COMPARISON:  None FINDINGS: Tricompartmental degenerative changes of the knee are seen. Bony structures and articulations appear within normal limits without visualized fracture, subluxation, or dislocation. Atherosclerotic changes are seen. Soft tissue phleboliths are seen. Soft tissue edema is noted.     1.  No acute traumatic bony injury. 2.  Tricompartmental degenerative changes of the knee. 3.  Atherosclerosis    Dx-tibia And Fibula Right    Result Date: 10/30/2020  10/29/2020 11:32 PM HISTORY/REASON FOR EXAM: Atraumatic Pain/Swelling/Deformity TECHNIQUE/EXAM DESCRIPTION:  AP and lateral views of the RIGHT tibia and fibula. COMPARISON:  None FINDINGS:  Tricompartmental degenerative changes of the knee are seen, otherwise the bony structures and articulations appear within normal limits without visualized fracture, subluxation, or dislocation. Atherosclerotic changes are seen. Scattered soft tissue level associated.     1.  No acute traumatic bony injury. 2.  Tricompartmental degenerative changes of the knee 3.  Atherosclerosis    Us-extremity Artery Lower Bilat    Result Date: 10/30/2020  Lower Extremity  Arterial Duplex Report  Vascular Laboratory  CONCLUSIONS  1) Bilateral atherosclerosis  2) Right distal SFA 50-75% stenosis.  2) Monophasic waveforms in the left lower extremity  JARRETT WHITE  Exam Date:     10/30/2020 01:09  Room #:     Inpatient  Priority:     Stat  Ht (in):             Wt (lb):  Ordering Physician:        ELIZABETH HILARIO  Referring Physician:       ELIZABETH HILARIO  Sonographer:               Silvia Lassiter RVT, RDMS  Study Type:                Complete Bilateral  Technical Quality:         Adequate  Age:    54    Gender:     M  MRN:    [id number]  :    1965      BSA:  Indications:     Ulceration of LE  CPT Codes:       47650  ICD Codes:       707.1  History:         Nonhealing, raw and bleeding wounds bilaterally. Blue                   discoloration of limbs. Severe pain bilaterally.  Limitations:     Pain.                RIGHT  Waveform        Peak Systolic Velocity (cm/s)                  Prox    Prox-Mid  Mid    Mid-Dist  Distal  Triphasic                         107                      CFA  Triphasic       110                                        PFA  Triphasic       96                89      214      79      SFA  Biphasic                          44                       POP  Biphasic                                           49      AT  Not                                                        PT  attained  Not                                                        BRITTON  attained                LEFT   Waveform        Peak Systolic Velocity (cm/s)                  Prox    Prox-Mid  Mid    Mid-Dist  Distal  Monophasic                        137              109     CFA  Biphasic        67                                         PFA  Monophasic      98                125                      SFA                                                             POP                                                             AT                                                             PT                                                             BRITTON  FINDINGS  Right.  There is atherosclerotic plaque seen throughout the limb.  Stenosis of the distal femoral artery. Velocities are consistent with 50-  75% stenosis.  Waveforms at the common femoral, profunda femoral and femoral artery are  triphasic.  Waveforms at the popliteal and distal anterior tibial artery are biphasic.  The remaining vessels were not properly visualized evaluated due to edema,  severe pain in non-healing bleeding wounds and large body habitus.  Left.  There is atherosclerotic plaque seen throughout the limb.  Waveforms at the common femoral, profunda femoral and femoral artery are  monophasic.  The remaining vessels of the left lower limb were not properly  visualized/evaluated due to edema, severe pain in area of non-healing  bleeding wounds and large body habitus.  Nima Dill MD  (Electronically Signed)  Final Date:      30 October 2020                   03:22    Us-extremity Venous Lower Bilat    Result Date: 10/30/2020   Vascular Laboratory  CONCLUSIONS  1) Normal bilateral superficial and deep venous examination of the lower  extremities.  2) Lower extremity edema, limits diagnostic sensitivity of this exam  JARRETT WHITE  Exam Date:     10/30/2020 00:46  Room #:     Inpatient  Priority:     Stat  Ht (in):             Wt (lb):  Ordering Physician:        ELIZABETH HILARIO  Referring Physician:       [id number] New  Sonographer:                Silvia Lassiter RVT,                             [facility] [[email or web address]]  Study Type:                Complete Bilateral  Technical Quality:         Adequate  Age:    54    Gender:     M  MRN:    [id number]  :    1965      BSA:  Indications:     Localized swelling, mass and lump, lower limb, bilateral,                   Edema, unspecified, Ulceration of LE  CPT Codes:       10003  ICD Codes:       R22.43  R60.9  707.1  History:         Swelling of bilateral lower limbs. Edema. Nonhealing, raw and                   bleeding wounds bilaterally.  Limitations:     Edema, large body habitus and severe pain in limbs at touch  PROCEDURES:  Bilateral lower extremity venous duplex imaging.  The following venous structures were evaluated: common femoral, profunda  femoral, proximal greater saphenous, femoral, popliteal , peroneal and  posterior tibial veins.  Serial compression, augmentation maneuvers,  color and spectral Doppler  flow evaluations were performed.  FINDINGS:  Bilateral lower extremities  No superficial or deep venous thrombosis.  Complete color filling and compressibility with normal venous flow dynamics  including spontaneous flow, response to augmentation maneuvers, and  respiratory phasicity.  The peroneal and posterior tibial veins are difficult to assess for  compressibility and flow by color flow due to severe pain in limbs through  the nonhealing bleeding wounds and edema.  Interstitial fluid consistent with edema is observed in the thigh and below  the knee.  Edema reduces the image quality.  Nima Dill MD  (Electronically Signed)  Final Date:      2020                   03:19    Ec-echocardiogram Complete W/o Cont    Result Date: 10/31/2020  Transthoracic Echo Report Echocardiography Laboratory CONCLUSIONS Poor quality echo, consider repeating with contrast Probably normal LVEF Mild aortic stenosis. RVSP estimated at 30-35 mmHg. JARRETT WHITE Exam Date:         10/31/2020                    09:44 Exam  Location:     Inpatient Priority:          Routine Ordering Physician:        MORIAH HUI Referring Physician: Sonographer:               Stalin Mtz NICOLÁS Age:    54     Gender:    M MRN:    [id number] :    1965 BSA:    2.58   Ht (in):    77     Wt (lb):    280 Exam Type:     Complete Indications:     Heart Failure, Systolic ICD Codes:       428.2 CPT Codes:       00969 BP:   119    /   67     HR:   73 Technical Quality:       Technically difficult study                          incomplete information is                          obtained MEASUREMENTS  (Male / Female) Normal Values 2D ECHO LVOT Diameter                     2 cm                  DOPPLER AV Peak Velocity                  2.6 m/s               AV Peak Gradient                  27.9 mmHg             AV Mean Gradient                  17.7 mmHg             LVOT Peak Velocity                1.4 m/s               AV Area Cont Eq vti               1.6 cm2               MV Velocity Time Integral         41 cm                 Mitral E Point Velocity           1 m/s                 Mitral E to A Ratio               1.1                   Mitral A Duration                 96.9 ms               MV Pressure Half Time             102 ms                MV Area PHT                       2.2 cm2               MV Deceleration Time              352 ms                TR Peak Velocity                  258 cm/s              PV Peak Velocity                  1.3 m/s               PV Peak Gradient                  6.5 mmHg              PV Mean Gradient                  3.9 mmHg              * Indicates values subject to auto-interpretation LV EF:        % FINDINGS Left Ventricle Normal left ventricular chamber size. Normal left ventricular wall thickness. Unable to determine diastolic function. Reliable ejection fraction estimate cannot be made due to technical limitation. Right Ventricle Right ventricle not well visualized. Right Atrium Dilated inferior vena cava  with inspiratory collapse. Left Atrium Left atrium not well visualized. Mitral Valve Mitral annular calcification. No stenosis or regurgitation seen. Aortic Valve The aortic valve is not well visualized. Mild aortic stenosis. Vmax is 2.5  m/s. Transvalvular gradients are - Peak: 26 mmHg, Mean: 18 mmHg. No aortic insufficiency. Tricuspid Valve Structurally normal tricuspid valve. No stenosis or regurgitation seen. RVSP estimated at 30-35 mmHg Pulmonic Valve Structurally normal pulmonic valve. No pulmonic stenosis. Mild pulmonic insufficiency. Pericardium Normal pericardium without effusion. Aorta Normal aortic root for body surface area. Ascending aorta diameter is 3.2 cm. Quinton Bueno MD (Electronically Signed) Final Date:     2020                 12:02    Ec-echocardiogram Ltd W/ Cont    Result Date: 11/3/2020  Transthoracic Echo Report Echocardiography Laboratory CONCLUSIONS Compared to the images of the prior study done on 10/31/2020, no significant changes are noted. Left ventricular ejection fraction is visually estimated to be 50%. Mild aortic stenosis. Estimated right ventricular systolic pressure  is 40 mmHg. JARRETT WHITE Exam Date:         2020                    09:41 Exam Location:     Inpatient Priority:          Routine Ordering Physician:        QUINTON BUENO                             ([id number]) Referring Physician:       XIMENA De Jesus Sonographer:               Analia Swan [facility] Age:    54     Gender:    M MRN:    [id number] :    1965 BSA:    2.84   Ht (in):    77     Wt (lb):    350 Exam Type:     Limited, Contrast Indications:     Congestive Heart Failure ICD Codes:       428 CPT Codes:       17373, , 19699, 74667 BP:   113    /   74     HR: Technical Quality:       Fair MEASUREMENTS  (Male / Female) Normal Values 2D ECHO Estimated LV Ejection Fraction    50 %                  IVC Diameter                      2.2 cm                DOPPLER AV Peak Velocity                   2.7 m/s               AV Peak Gradient                  30.1 mmHg             AV Mean Gradient                  18.1 mmHg             LVOT Peak Velocity                1.3 m/s               MV Velocity Time Integral         35.8 cm               MV Pressure Half Time             80 ms                 MV Area PHT                       2.8 cm2               TR Peak Velocity                  299 cm/s              * Indicates values subject to auto-interpretation LV EF:  50    % FINDINGS Left Ventricle 3 mL of contrast was administered. Existing IV was used. Contrast was used to enhance visualization of the endocardial border. Left ventricular systolic function is low normal. Left ventricular ejection fraction is visually estimated to be 50%. Right Ventricle Right Atrium Left Atrium Mitral Valve Mild mitral stenosis. Mean transvalvular gradient is 3  mmHg at a heart rate of 78  BPM. Aortic Valve Mild aortic stenosis. Vmax is  2.6 m/s. Transvalvular gradients are - Peak: 29 mmHg, Mean: 17 mmHg. Tricuspid Valve Mild tricuspid regurgitation. Right atrial pressure is estimated to be 3 mmHg. Estimated right ventricular systolic pressure  is 40 mmHg. Pulmonic Valve Pericardium Normal pericardium without effusion. Aorta Jerry Reyes MD (Electronically Signed) Final Date:     03 November 2020                 12:41      Micro:  Results     Procedure Component Value Units Date/Time    Blood Culture [[id number]] Collected: 11/01/20 1302    Order Status: Completed Specimen: Blood Updated: 11/03/20 0743     Significant Indicator NEG     Source BLD     Site Peripheral     Culture Result No Growth  Note: Blood cultures are incubated for 5 days and  are monitored continuously.Positive blood cultures  are called to the RN and reported as soon as  they are identified.      Narrative:      Left AC    Blood Culture [[id number]] Collected: 11/01/20 1302    Order Status: Completed Specimen: Blood Updated: 11/03/20 0743      "Significant Indicator NEG     Source BLD     Site Peripheral     Culture Result No Growth  Note: Blood cultures are incubated for 5 days and  are monitored continuously.Positive blood cultures  are called to the RN and reported as soon as  they are identified.      BLOOD CULTURE [[id number]] Collected: 11/01/20 1400    Order Status: Sent Specimen: Blood from Peripheral     BLOOD CULTURE [[id number]] Collected: 11/01/20 1400    Order Status: Sent Specimen: Blood from Peripheral     E-Test [[id number]] Collected: 10/29/20 2338    Order Status: Completed Specimen: Other Updated: 11/01/20 0839     ETEST Sensitivity FINAL    Narrative:      171 tel. [phone] 10/31/2020, 11:38, RB PERF. RESULTS CALLED TO:[id number]  Per Hospital Policy: Only change Specimen Src: to \"Line\" if  specified by physician order.    BLOOD CULTURE x2 [[id number]]  (Abnormal)  (Susceptibility) Collected: 10/29/20 2338    Order Status: Completed Specimen: Blood from Peripheral Updated: 11/01/20 0839     Significant Indicator POS     Source BLD     Site PERIPHERAL     Culture Result Growth detected by Bactec instrument. 10/30/2020  12:52      Beta Hemolytic Streptococcus group A  This isolate is presumed to be clindamycin resistant based on  detection of inducible resistance.  Clindamycin may still  be effective in some patients.      Narrative:      CALL  [place]  171 tel. [phone],  CALLED  171 tel. [phone] 10/31/2020, 11:38, RB PERF. RESULTS CALLED  TO:[id number]  Per Hospital Policy: Only change Specimen Src: to \"Line\" if  specified by physician order.  Right AC    Susceptibility     Beta hemolytic streptococcus group a (1)     Antibiotic Interpretation Microscan Method Status    Penicillin Sensitive 0.032 mcg/mL E-TEST Final    Cefotaxime Sensitive 0.023 mcg/mL E-TEST Final                   CULTURE WOUND W/ GRAM STAIN [[id number]]  (Abnormal)  (Susceptibility) Collected: 10/30/20 0103    Order Status: Completed Specimen: Wound from Abscess Updated: " "11/01/20 0756     Significant Indicator POS     Source WND     Site Bilateral Lower Extremity     Culture Result Light growth mixed skin eloy.     Gram Stain Result Moderate Gram positive cocci.  Rare small Gram positive rods.       Culture Result Staphylococcus aureus  Heavy growth        Beta Hemolytic Streptococcus group A  Heavy growth      Narrative:      CALL  [place]  171 tel. [phone],  CALLED  171 tel. [phone] 10/31/2020, 11:44, RB PERF. RESULTS CALLED  TO:[id number] RN    Susceptibility     Staphylococcus aureus (1)     Antibiotic Interpretation Microscan Method Status    Azithromycin Resistant >4 mcg/mL JELANI Final    Clindamycin Sensitive <=0.5 mcg/mL JELANI Final    Cefazolin Sensitive <=8 mcg/mL JELANI Final    Ceftaroline Sensitive <=0.5 mcg/mL JELANI Final    Daptomycin Sensitive <=1 mcg/mL JELANI Final    Ampicillin/sulbactam Sensitive <=8/4 mcg/mL JELANI Final    Erythromycin Resistant >4 mcg/mL JELANI Final    Vancomycin Sensitive 1 mcg/mL JELANI Final    Oxacillin Sensitive 0.5 mcg/mL JELANI Final    Penicillin Resistant >8 mcg/mL JELANI Final    Pip/Tazobactam Sensitive <=4 mcg/mL JELANI Final    Trimeth/Sulfa Sensitive <=0.5/9.5 mcg/mL JELANI Final    Tetracycline Sensitive <=4 mcg/mL JELANI Final                   BLOOD CULTURE x2 [[id number]] Collected: 10/29/20 2336    Order Status: Completed Specimen: Blood from Peripheral Updated: 10/31/20 0805     Significant Indicator NEG     Source BLD     Site PERIPHERAL     Culture Result No Growth  Note: Blood cultures are incubated for 5 days and  are monitored continuously.Positive blood cultures  are called to the RN and reported as soon as  they are identified.      Narrative:      Per Hospital Policy: Only change Specimen Src: to \"Line\" if  specified by physician order.  Right Forearm/Arm    GRAM STAIN [[id number]] Collected: 10/30/20 0103    Order Status: Completed Specimen: Wound Updated: 10/30/20 2155     Significant Indicator .     Source WND     Site Bilateral Lower Extremity     " Gram Stain Result Moderate Gram positive cocci.  Rare small Gram positive rods.      Urinalysis [[id number]]  (Abnormal) Collected: 10/30/20 0634    Order Status: Completed Specimen: Urine, Cath Updated: 10/30/20 0733     Color DK Yellow     Character Turbid     Specific Gravity 1.027     Ph 5.0     Glucose Negative mg/dL      Ketones Negative mg/dL      Protein 100 mg/dL      Bilirubin Moderate     Urobilinogen, Urine 1.0     Nitrite Negative     Leukocyte Esterase Trace     Occult Blood Moderate     Micro Urine Req Microscopic    Narrative:      If not done within the last 24 hours    URINALYSIS [[id number]]     Order Status: Canceled Specimen: Urine, Cath     SARS-CoV-2, PCR (In-House) [[id number]] Collected: 10/30/20 0126    Order Status: Completed Updated: 10/30/20 0242     SARS-CoV-2 Source NP Swab     SARS-CoV-2 by PCR NotDetected     Comment: [facility] providers: PLEASE REFER TO DE-ESCALATION AND RETESTING PROTOCOL  on [email or web address]  **The Properati GeneXpert Xpress SARS-CoV-2 Test has been made available for  use under the Emergency Use Authorization (EUA) only.         Narrative:      Is patient being admitted?->Yes  Does this patient meet criteria for Rush/Cepheid per [facility]  Inpatient Workflow? (See workflow link below)->Yes  Expected turn around time?->Rush (Cepheid 2-4 hours)  Is this the patients First SARS CoV-2 test?->Unknown  Is this patient employed in healthcare?->No  Is the patient symptomatic as defined by the CDC?->Unknown  Is the patient hospitalized?->Yes  Is the patient in the ICU?->Unknown  Is the patient a resident in a congregate care setting?->No  Is the patient pregnant?->No    COVID/SARS CoV-2 PCR [[id number]] Collected: 10/30/20 0126    Order Status: Completed Specimen: Respirate from Nasopharyngeal Updated: 10/30/20 0142     COVID Order Status Received     Comment: The order for SARS CoV-2 testing has been received by the  Laboratory. This result is neither positive nor negative.  Final  results of testing will report in 24-48 hours, separately.         Narrative:      Is patient being admitted?->Yes  Does this patient meet criteria [facility]  [facility] Workflow? (See workflow link below)->Yes  Expected turn around time?->Rush (Cepheid 2-4 hours)  Is this the patients First SARS CoV-2 test?->Unknown  Is this patient employed in healthcare?->No  Is the patient symptomatic as defined by the CDC?->Unknown  Is the patient hospitalized?->Yes  Is the patient in the ICU?->Unknown  Is the patient a resident in a congregate care setting?->No  Is the patient pregnant?->No    Blood Culture [[id number]] Collected: 10/30/20 0000    Order Status: Canceled Specimen: Other from Peripheral     Blood Culture [[id number]] Collected: 10/30/20 0000    Order Status: Canceled Specimen: Other from Peripheral           Assessment:  Active Hospital Problems    Diagnosis   • *Cellulitis [L03.90]   • Right-sided heart failure ([facility]) [I50.810]   • Sepsis ([facility]) [A41.9]   • High anion gap metabolic acidosis [E87.2]   • Supratherapeutic INR [R79.1]   • Paroxysmal atrial fibrillation ([facility]) [I48.0]   • LINSEY (acute kidney injury) ([facility]) [N17.9]   • Polycythemia [D75.1]   • Elevated troponin [R77.8]   • Morbid obesity with BMI of 40.0-44.9, adult ([facility]) [E66.01, Z68.41]   • Heart failure with preserved ejection fraction, borderline, class IV ([facility]) [I50.30]   • Type 2 diabetes mellitus with complication, without long-term current use of insulin ([facility]) [E11.8]   • COPD (chronic obstructive pulmonary disease) ([facility]) [J44.9]   • Hyponatremia [E87.1]   • Elevated LFTs [R79.89]     Interval 24 hours:      AF, O2 4 L NC- improved today   Labs reviewed, WBC 25.5    Imaging personally reviewed both images and report.   Studies reviewed  Micro reviewed    Patient has agreed to surgery and plan is for AKA on the left soon and then potential debridement or amputation of right in the future.  Patient continues to have significant pain  swelling and redness in both legs particularly on the left.    Assessment:  Hypoxia, improved  - chest x-ray with edema, no obvious pneumonia  Bacteremia, GAS  -Cultures from 10/29 and 10/30 with group A strep, clindamycin resistant, penicillin sensitive, 0.032, cefotaxime sensitive 0.023  -TTE on 11/1 with results pending  Bilateral lower extremity necrotizing cellulitis and concern for development of necrotizing fasciitis-pain out of proportion on right and appears to be worsening area of edema erythema and pain on the left  -Wound cultures with GAS and MSSA, eliana stain with GPCs and rare GPRs  - Plan is for left side AKA -he has been refusing intervention but has now agreed for surgery  -CT lower extremity right without contrast on 10/31 without abscess or soft tissue gas.  CT lower extremity left with no discrete fluid collection to suggest abscess or noted air in the tissues.  Leukocytosis, increasing   Acute kidney injury-improved again today   Demand ischemia with elevation of his troponins and proBNP  Penicillin allergic, with anaphylaxis and rash, however this was as a child so unknown if still present - tolerated cefepime, ceftriaxone and cefazolin per notes   Diabetes     Plan:  ---  Continue cefzolin for the group A strep bacteremia and will also cover the MSSA cellulitis.  Due to concern for toxin production and potential necrotizing fasciitis initiated linezolid to give additional therapy and thought to be better choice given reported clindamycin resistance of the group A strep  --- Continue to monitor labs and vitals, if he worsens repeat CT of right lower extremity to see if he has developed necrotizing fasciitis or abscess   --- Agree with plan for amputation to hopefully achieve definitive source control   --- Monitor and control blood sugars       Discussed with Dr. Eller and ICU nurse.  ID will follow.

## 2020-11-03 NOTE — PROGRESS NOTES
[facility] ICU Progress Note      Admit Date: 10/29/2020    Resident(s): Kady Barraza M.D.   Attending:  SHIVAM KAISER/ Dr. Rafita MD    Patient ID:    Name:  Joshua Medrano   YOB: 1965  Age:  54 y.o.  male   MRN:  [id number]    Diagnosis:  Severe sepsis secondary to severe lower extremity cellulitis  Acute kidney injury     ID:  Mr. Medrano is a 54 y.o. male w/ CAD/CABG 2019, Afib, COPD, DM, and PAD who presented 10/29/2020 with LLE cellulitis, strep sepsis, and severe leg pain. Cardiology, vascular surgery, orthopedic surgery and ID are on board. Patient was transferred to the ICU on 10/31 for worsening cardiac function, dropping sodium, increasing oxygen demands and hypotension in the setting of sepsis.     Hospital Course  10/31 admitted to ICU  10/31 CT legs bilaterally negative for abscesses or air.   11/2 O2 requirements increasing, now on 10 L, norepinephrine weaned off, signs of persisting sepsis from his cellulitis in LE, WBC 24.9, still refusing surgery, Clinda/Dapto  11/3: Lasix reduced to IV 40 mg BID, patient agreed for surgery, updated orthopedic surgery, IS    Consultants:  Critical Care  Orthopedics  Infectious disease  Cardiology  Vascular medicine    Interval Events:  -No acute events overnight  -Patient reports that his pain is improving (received Dilaudid 1mg x2 and Norco 10 mgx1 yesterday)  -LBM prior to admission.  -Tearful and scared about surgery, wants to live. Discussed with him about risks of not having surgery. Patient would like to go for surgery and wants to discuss with orthopedics. Updated PA    Vitals Range last 24h:  Temp:  [37.3 °C (99.1 °F)-37.5 °C (99.5 °F)] 37.5 °C (99.5 °F)  Pulse:  [68-85] 78  Resp:  [15-27] 22  BP: (109-139)/(63-81) 137/76  SpO2:  [89 %-97 %] 94 %    Intake/Output Summary (Last 24 hours) at 11/3/2020 0704  Last data filed at 11/3/2020 0600  Gross per 24 hour   Intake 31.67 ml   Output 72818 ml   Net -22905.33 ml        Review of Systems    Constitutional: Negative for chills and fever.   HENT: Negative for congestion, hearing loss and sore throat.    Eyes: Negative for blurred vision and pain.   Respiratory: Negative for cough and shortness of breath.    Cardiovascular: Positive for leg swelling. Negative for chest pain and palpitations.   Gastrointestinal: Positive for constipation. Negative for abdominal pain, nausea and vomiting.   Genitourinary: Negative for dysuria and frequency.   Musculoskeletal: Negative for falls and neck pain.   Skin: Negative for itching.        Swelling and redness with ulceration both lower legs, Left >Right   Neurological: Negative for sensory change and headaches.   Endo/Heme/Allergies: Negative for polydipsia. Does not bruise/bleed easily.   Psychiatric/Behavioral: Negative for suicidal ideas. The patient is nervous/anxious.    All other systems reviewed and are negative.    PHYSICAL EXAM:  Vitals:    11/03/20 0300 11/03/20 0400 11/03/20 0500 11/03/20 0600   BP: 125/74 115/79 139/81 137/76   Pulse: 77 81 79 78   Resp: (!) 22 (!) 22 20 (!) 22   Temp:       TempSrc:       SpO2: 95% 96% 93% 94%   Weight:       Height:        Body mass index is 44.42 kg/m².    O2 therapy: Pulse Oximetry: 94 %, O2 (LPM): 4, O2 Delivery Device: Silicone Nasal Cannula    Physical Exam  Constitutional:       General: He is not in acute distress.     Appearance: He is obese.      Comments: Tearful during conversation today, says he is scared and wants to live   HENT:      Head: Normocephalic and atraumatic.      Nose: Nose normal.      Mouth/Throat:      Mouth: Mucous membranes are moist.   Eyes:      Extraocular Movements: Extraocular movements intact.      Conjunctiva/sclera: Conjunctivae normal.      Pupils: Pupils are equal, round, and reactive to light.   Neck:      Musculoskeletal: Normal range of motion. No muscular tenderness.      Comments: JVD +  Cardiovascular:      Rate and Rhythm: Normal rate and regular rhythm.      Heart  sounds: Normal heart sounds. No murmur.   Pulmonary:      Effort: Pulmonary effort is normal. No respiratory distress.      Comments: Decreased breath sounds in bases  Abdominal:      General: Bowel sounds are normal. There is no distension.      Palpations: Abdomen is soft. There is no mass.      Tenderness: There is no abdominal tenderness.      Hernia: A hernia (right umbilical hernia +, reducible) is present.   Musculoskeletal:         General: Swelling and tenderness present.      Right lower leg: Edema present.      Left lower leg: Edema present.      Comments: Bilateral lower extremity redness, swelling, warmth, tenderness, with ulcerations - L>R. No palpable crepitus. Feeble pedal pulse on left. Both legs wrapped in bandages   Skin:     General: Skin is warm.      Capillary Refill: Capillary refill takes 2 to 3 seconds.   Neurological:      Mental Status: He is alert and oriented to person, place, and time. Mental status is at baseline.   Psychiatric:         Behavior: Behavior normal.         Thought Content: Thought content normal.         Judgment: Judgment normal.      Comments: Tearful, anxious       Meds:  • potassium chloride SA  40 mEq     • magnesium sulfate  2 g     • ceFAZolin  2 g Stopped (11/03/20 0616)   • linezolid  600 mg     • furosemide  80 mg     • norepinephrine (Levophed) infusion  0-30 mcg/min Stopped (11/01/20 0905)   • HYDROmorphone  0.5-1 mg     • albuterol  2 Puff     • amiodarone  200 mg     • ipratropium-albuterol  3 mL     • glycopyrrolate  1 Cap     • senna-docusate  2 Tab      And   • polyethylene glycol/lytes  1 Packet      And   • magnesium hydroxide  30 mL      And   • bisacodyl  10 mg     • Respiratory Therapy Consult       • cloNIDine  0.1 mg     • acetaminophen  500 mg     • HYDROcodone/acetaminophen  1 Tab     • insulin regular  1-6 Units      And   • glucose  16 g      And   • dextrose 50%  50 mL          Procedures:  10/29: Central line, RIJ    Labs:  Most Recent Labs:     Lab Results   Component Value Date/Time    WBC 25.5 (H) 11/03/2020 04:45 AM    RBC 6.62 (H) 11/03/2020 04:45 AM    HEMOGLOBIN 19.2 (H) 11/03/2020 04:45 AM    HEMATOCRIT 58.9 (H) 11/03/2020 04:45 AM    MCV 89.0 11/03/2020 04:45 AM    MCH 29.0 11/03/2020 04:45 AM    MCHC 32.6 (L) 11/03/2020 04:45 AM    MPV 10.7 11/03/2020 04:45 AM    NEUTSPOLYS 80.80 (H) 11/03/2020 04:45 AM    LYMPHOCYTES 1.70 (L) 11/03/2020 04:45 AM    MONOCYTES 12.50 11/03/2020 04:45 AM    EOSINOPHILS 1.70 11/03/2020 04:45 AM    EOSINOPHILS 2 11/07/2018 09:34 AM    BASOPHILS 0.80 11/03/2020 04:45 AM    HYPOCHROMIA 1+ 09/10/2019 08:09 PM    ANISOCYTOSIS 2+ (A) 11/01/2020 04:20 AM      Lab Results   Component Value Date/Time    SODIUM 126 (L) 11/03/2020 04:45 AM    POTASSIUM 3.6 11/03/2020 04:45 AM    CHLORIDE 88 (L) 11/03/2020 04:45 AM    CO2 30 11/03/2020 04:45 AM    GLUCOSE 192 (H) 11/03/2020 04:45 AM    BUN 41 (H) 11/03/2020 04:45 AM    CREATININE 1.16 11/03/2020 04:45 AM      Lab Results   Component Value Date/Time    ALTSGPT 34 10/30/2020 06:53 AM    ASTSGOT 50 (H) 10/30/2020 06:53 AM    ALKPHOSPHAT 123 (H) 10/30/2020 06:53 AM    TBILIRUBIN 1.4 10/30/2020 06:53 AM    LIPASE 546 (H) 09/12/2019 10:54 AM    ALBUMIN 2.6 (L) 10/30/2020 06:53 AM    GLOBULIN 4.2 (H) 10/30/2020 06:53 AM    PREALBUMIN 10.0 (L) 01/29/2018 03:58 AM    INR 1.93 (H) 11/03/2020 04:45 AM    MACROCYTOSIS 1+ 11/01/2020 04:20 AM     Lab Results   Component Value Date/Time    PROTHROMBTM 22.6 (H) 11/03/2020 04:45 AM    INR 1.93 (H) 11/03/2020 04:45 AM      Imaging:  EC-ECHOCARDIOGRAM LTD W/ CONT         DX-CHEST-LIMITED (1 VIEW)   Final Result      1.  Right internal jugular catheter is been placed and appears appropriately located      2.  Moderate pulmonary edema      3.  Enlarged cardiac silhouette      CT-EXTREMITY, LOWER W/O RIGHT   Final Result      1.  No discrete fluid collection is identified to suggest abscess.      2.  Diffuse subcutaneous edema and overlying skin  thickening with venous varicosities.      3.  Atherosclerosis.      4.  Diffuse muscle atrophy.      CT-EXTREMITY, LOWER W/O LEFT   Final Result      1.  Left leg cellulitis without abscess or soft tissue gas      2.  Small vessel atherosclerotic plaque      3.  osteoarthritis of the left knee and left midfoot      4.  Muscular atrophy      EC-ECHOCARDIOGRAM COMPLETE W/O CONT   Final Result      DX-CHEST-PORTABLE (1 VIEW)   Final Result         1.  Pulmonary edema and/or infiltrates are identified, which are stable since the prior exam.   2.  Cardiomegaly      US-EXTREMITY ARTERY LOWER BILAT   Final Result      US-EXTREMITY VENOUS LOWER BILAT   Final Result      DX-TIBIA AND FIBULA RIGHT   Final Result         1.  No acute traumatic bony injury.   2.  Tricompartmental degenerative changes of the knee   3.  Atherosclerosis      DX-TIBIA AND FIBULA LEFT   Final Result   Addendum 1 of 1   Addendum: Subtle clustered punctate lucencies lateral to the ankle are    seen which could represent tiny foci of soft tissue gas.      These findings were discussed with the patient's clinician, ELIZABETH HILARIO,    on 10/30/2020 12:34 AM.      Final      DX-CHEST-PORTABLE (1 VIEW)   Final Result         1.  Interstitial pulmonary parenchymal prominence, compatible with interstitial edema and/or infiltrates.   2.  Cardiomegaly        ASSESSEMENT and PLAN:  * Cellulitis  Assessment & Plan  -Severe cellulitis of bilateral lower extremities  -? Necrotizing fasciitis but CT negative  -Continue antibiotics per ID recs, currently on cefazolin and linezolid  -Vascular surgery, Orthopedics and ID are following  -Pain management - PO Norco and IV dilaudid PRN  -11/3: Patient agrees to surgery, updated orthopedics    Sepsis (HCC)  Assessment & Plan  -Likely from infection in bilateral lower extremities  -? Necrotizing fasciitis but CT negative  -Orthopedic surgery,Vascular surgery,ID - following  -10/29: BC - GAS; repeat BC 11/1: NGTD  -10/30: WC  - GAS, MSSA    PLAN:  -Leukocytosis- worsening  -11/2: Cefazolin and Linezolid  -Follow blood cultures  -Maintain MAP > 65  -Off pressors  -11/3:Patient agreeing for surgery today, updated orthopedics team for further plan    High anion gap metabolic acidosis  Assessment & Plan  -RESOLVED  -Likely from sepsis and poor perfusion    Supratherapeutic INR- (present on admission)  Assessment & Plan  -At admission INR 4.46, Likely from sepsis  -Warfarin held on admission  -11/3: INR 1.93  -Will restart after knowing the plan for surgery    Paroxysmal atrial fibrillation (HCC)- (present on admission)  Assessment & Plan  -Controlled on metoprolol, amiodarone  -[id number]: 4 points; HASBLED: 2 points.  -On warfarin with supratherapeutic INR (4.46 10/30/2020); likely from sepsis    PLAN:  -Continue amiodarone  -11/3: Metoprolol 12.5 mg BID  -Will restart anti-coagulation after surgery  -Goal for K>4, Mg>2    LINSEY (acute kidney injury) ([facility])  Assessment & Plan  -Improving  -Likely ATN from sepsis vs cardiorenal  -11/2: Continue diuresis 80 mg BID per cardiology  -Monitor UOP, I&Os  -Avoid nephrotoxins    Elevated LFTs  Assessment & Plan  -Likely from sepsis  -Monitor CMP    Polycythemia  Assessment & Plan  -Chronic  -Likely 2/2 chronic hypoxia due to COPD vs SHASHANK  -Daily labs as inpatient  -F/U with PCP for outpatient sleep studies    Elevated troponin- (present on admission)  Assessment & Plan  -with elevated BNP, no acute ischemic changes on EKG  -Demand ischemia, Type 2 MI, in the setting of heart failure and sepsis  -11/2/20: Repeat ECHO - results pending    Morbid obesity with BMI of 40.0-44.9, adult ([facility])- (present on admission)  Assessment & Plan  -BMI 44.42  -Encourage mobility  -Weight loss counseling when appropriate    Heart failure with preserved ejection fraction, borderline, class IV ([facility])- (present on admission)  Assessment & Plan  -Continue diuresis, lasix changed to IV lasix 40 mg BID on 11/3/2020  -Hold  metoprolol due to pressor support at this time    Type 2 diabetes mellitus with complication, without long-term current use of insulin ([facility])- (present on admission)  Assessment & Plan  -HbA1C 7.8  -ISS  -Hypoglycemia protocol    COPD (chronic obstructive pulmonary disease) ([facility])  Assessment & Plan  Stable  Continue home inhalers  Supplemental O2  RT protocol    Hyponatremia  Assessment & Plan  IVC dilated on ECHO  Continue diuresis  Monitor sodium    DISPO: ICU for close hemodynamic monitoring    CODE STATUS: FULL CODE    Quality Measures:  Feeding: PO cardiac diet  Analgesia: PO Norco and IV dilaudid  Sedation: None  Thromboprophylaxis: No pharmacological - held for elevated INR and now for surgery plan   Head of bed: >30 degrees  Ulcer prophylaxis: None  Glycemic control: 4U regular  Bowel care: bowel regimen per protocol  Indwelling lines: [facility]  Deescalation of antibiotics: Cefazolin and Linezolid     Kady Barraza M.D.
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
## 2020-11-03 NOTE — ASSESSMENT & PLAN NOTE
Tolerating forced diuresis with good response, -27+ liter fluid balance so far plus perhaps 3 unit blood loss with surgery  May be able to reduce Lasix to 20 mg once a day  SHASHANK?  Strongly suspected, suggest pulmonary service follow patient and arrange outpatient PSG  Echocardiogram noted, RVSP 40 consistent with mild-moderate pulmonary hypertension

## 2020-11-03 NOTE — PROGRESS NOTES
"                                               Cadiology Progress Note    Interval History:  11/2: Appears tired, on 10 L oxygen this morning. Denies any SOB/ chest pain. UOP 9.4L in last 24 hours.   11/03: Appears same as yesterday. On 6 L Oxygen this AM. UOP 10 L in last 24 hours. Left lower extremity appears more swelling and more erythematous, spreading proximally.     Physical Exam   Blood pressure 120/75, pulse 81, temperature 37.4 °C (99.3 °F), temperature source Bladder, resp. rate 18, height 1.956 m (6' 5\"), weight (!) 169.9 kg (374 lb 9 oz), SpO2 94 %.    Constitutional: Appears chronically ill, obese  HENT: Normocephalic and atraumatic. No scleral icterus.   Neck: JVD undetectable due to body habitus  Cardiovascular: Normal rate, irregular rythym. Exam reveals no gallop and no friction rub. + Systolic murmur  Pulmonary/Chest: Diminished breath sound bilaterally  Abdominal: S/NT/ND BS  Musculoskeletal: 2+ edema bilaterally, erythematous legs. Left leg more red, more swollen, spreading proximally. Legs wrapped upto knee.   Skin: Skin is warm and dry.           Intake/Output Summary (Last 24 hours) at 11/3/2020 0954  Last data filed at 11/3/2020 0800  Gross per 24 hour   Intake 31.67 ml   Output 76669 ml   Net -16600.33 ml       Recent Labs     11/01/20 0420 11/02/20  0345 11/03/20  0445   WBC 21.9* 24.8* 25.5*   RBC 6.47* 6.42* 6.62*   HEMOGLOBIN 18.8* 18.7* 19.2*   HEMATOCRIT 57.9* 57.1* 58.9*   MCV 89.5 88.9 89.0   MCH 29.1 29.1 29.0   MCHC 32.5* 32.7* 32.6*   RDW 60.2* 59.7* 59.6*   PLATELETCT 100* 156* 187   MPV 11.2 11.1 10.7     Recent Labs     11/01/20  0420 11/02/20  0345 11/03/20  0445   SODIUM 129* 130* 126*   POTASSIUM 4.6 4.1 3.6   CHLORIDE 96 95* 88*   CO2 20 24 30   GLUCOSE 127* 185* 192*   BUN 55* 47* 41*   CREATININE 1.93* 1.55* 1.16   CALCIUM 8.6 7.9* 8.2*     Recent Labs     11/01/20 0420 11/02/20 0345 11/03/20  0445   INR 3.49* 3.21* 1.93*         Recent Labs     11/01/20 0420 "   CPKTOTAL 156*           No current facility-administered medications on file prior to encounter.      Current Outpatient Medications on File Prior to Encounter   Medication Sig Dispense Refill   • warfarin (COUMADIN) 5 MG Tab TAKE 1 & 1/2 (ONE & ONE-HALF) TABLETS BY MOUTH ONCE DAILY OR  AS  DIRECTED  BY  COUMADIN  CLINIC 45 Tab 0   • rosuvastatin (CRESTOR) 40 MG tablet Take 1 Tab by mouth every morning. 90 Tab 3   • furosemide (LASIX) 40 MG Tab Take 1 Tab by mouth every morning. 90 Tab 3   • spironolactone (ALDACTONE) 25 MG Tab Take 1 Tab by mouth every morning. 90 Tab 3   • metoprolol SR (TOPROL XL) 25 MG TABLET SR 24 HR Take 1 Tab by mouth every morning. 90 Tab 3   • amiodarone (CORDARONE) 200 MG Tab Take 1 Tab by mouth every morning. 90 Tab 3   • lisinopril (PRINIVIL) 2.5 MG Tab Take 1 Tab by mouth every morning. 90 Tab 3   • potassium chloride SA (K-DUR) 10 MEQ Tab CR Take 1 Tab by mouth every morning. 90 Each 3   • acetaminophen (TYLENOL) 325 MG Tab Take 2 Tabs by mouth every 6 hours as needed (Mild Pain; (Pain scale 1-3); Temp greater than 100.5 F). 30 Tab 0   • albuterol 108 (90 Base) MCG/ACT Aero Soln inhalation aerosol Inhale 2 Puffs by mouth every 6 hours as needed for Shortness of Breath.     • ferrous sulfate 325 (65 Fe) MG tablet Take 325 mg by mouth every morning.     • polyethylene glycol/lytes (MIRALAX) Pack Take 17 g by mouth every morning.     • tiotropium (SPIRIVA) 18 MCG Cap Inhale 18 mcg by mouth every morning.     • ipratropium-albuterol (DUONEB) 0.5-2.5 (3) MG/3ML nebulizer solution 3 mL by Nebulization route every 6 hours as needed for Shortness of Breath. 180 Bullet 10       Current Facility-Administered Medications   Medication Dose Frequency Provider Last Rate Last Admin   • furosemide (LASIX) injection 40 mg  40 mg BID DIURETIC Adrianna Fabian M.D.       • MD Alert...ICU Electrolyte Replacement per Pharmacy   PHARMACY TO DOSE Silvio Eller M.D.       • ceFAZolin in dextrose (ANCEF)  IVPB premix 2 g  2 g Q8HRS Carol Barber M.D.   Stopped at 11/03/20 0616   • linezolid (ZYVOX) tablet 600 mg  600 mg Q12HRS Carol Barber M.D.   600 mg at 11/03/20 0521   • norepinephrine (Levophed) infusion 8 mg/250 mL (premix)  0-30 mcg/min Continuous Jodi Small M.D.   Stopped at 11/01/20 0905   • HYDROmorphone pf (DILAUDID) injection 0.5-1 mg  0.5-1 mg Q2HRS PRN Meng Seymour M.D.   0.5 mg at 11/03/20 0416   • albuterol inhaler 2 Puff  2 Puff Q6HRS PRN Jayro Vásquez M.D.   2 Puff at 11/01/20 1901   • amiodarone (Cordarone) tablet 200 mg  200 mg MARTHA Vásquez M.D.   200 mg at 11/03/20 0521   • ipratropium-albuterol (DUONEB) nebulizer solution  3 mL Q6HRS PRN (RT) Jayro Vásquez M.D.       • glycopyrrolate (Seebri) 15.6 mcg inhalation capsule 1 Cap  1 Cap BID (RT) Jayro Vásquez M.D.   1 Cap at 11/02/20 2027   • senna-docusate (PERICOLACE or SENOKOT S) 8.6-50 MG per tablet 2 Tab  2 Tab BID Artem Givens M.D.   2 Tab at 11/03/20 0521    And   • polyethylene glycol/lytes (MIRALAX) PACKET 1 Packet  1 Packet QDAY PRN Artem Givens M.D.        And   • magnesium hydroxide (MILK OF MAGNESIA) suspension 30 mL  30 mL QDAY PRN Artem Givens M.D.        And   • bisacodyl (DULCOLAX) suppository 10 mg  10 mg QDAY PRN Artem Givens M.D.       • Respiratory Therapy Consult   Continuous RT Artem Givens M.D.       • cloNIDine (CATAPRES) tablet 0.1 mg  0.1 mg Q6HRS PRN HUI Duncan.D.       • acetaminophen (TYLENOL) tablet 500 mg  500 mg Q6HRS MARLENE Givens M.D.       • HYDROcodone/acetaminophen (NORCO)  MG per tablet 1 Tab  1 Tab Q6HRS MARLENE Givens M.D.   1 Tab at 11/03/20 0912   • insulin regular (HumuLIN R,NovoLIN R) injection  1-6 Units Q6HRS Artem Givens M.D.   2 Units at 11/03/20 0534    And   • glucose 4 g chewable tablet 16 g  16 g Q15 MIN PRROSANA Givens M.D.        And   • dextrose 50% (D50W) injection 50 mL  50 mL Q15 MIN PRROSANA Givens M.D.       Last reviewed on  10/30/2020  9:54 AM by Vinny Garcia    Medications reviewed    Imaging reviewed      Impressions:  1. Strep bacteremia, source lower extremity cellulitis  2. Acute decompensated diastolic heart failure  3. Bilateral lower extremity cellulitis  4. Severe PAD  5. Paroxysmal Afib, rate controlled  6. Coronary artery disease, history of two-vessel bypass in 2017, RCA  not revascularized    Recommendations:  - Backing off on Lasix as he is net negative 19 L since admission, HCO3 is 30 today, starting to get contraction alkalosis. Recommend reducing Lasix To 40 mg IV BID  - No change in other recommendations : rate controlled for Afib, continue Amiodarone 200 daily. Consider restarting Outpatient metoprolol if blood pressure permits. If no immediate plan for surgery, consider restarting anti coagulation (can start Lovenox therapeutic dose) now that his INR has been corrected.  - Antibiotics per ID       Discussed with primary physician.

## 2020-11-03 NOTE — PROGRESS NOTES
"    Progress Note    Seen today to discuss any further decisions regarding surgery.Patient states he has been able to speak with his brother and elects to have any decisions about his care or go through his brother.  Ortho was by last night to discuss options with the patient.  Plans not set as patient will be having a palliative care consult today.      Assessment:  BLE wounds  cellulits    Plan:  No acute vascular intervention planned. Still awaiting patient and family decision regarding surgical options. Will need source control.    Plan per ortho would be: \"Source control will likely involve guillotine L through knee amputation, with eventual AKA.  Right side appears more chronic, so could potentially wait on that side for now, or consider debridements, but he is at high risk for AKA on that side as well.\"    Awaiting palliative care consult.    Will follow along      Tatiana Hart PA-C  Vascular Surgery  [facility]  Office: [phone]    Attending Addendum  Awaiting palliative care consult. Needs source control and declining intervention.  Will follow peripherally and defer any debridement (if he elects for it) to ortho.    Teo Johnson MD              "

## 2020-11-03 NOTE — PROGRESS NOTES
"Seen and examined.  Doing OK.  No pressors at this time.  Erythema looks worse on the left per nursing, patient thinks it is better.  Not sure if he wants surgery, family discussing his options.  Palliative care consult tomorrow.    /63   Pulse 81   Temp 37.4 °C (99.3 °F) (Bladder)   Resp 20   Ht 1.956 m (6' 5\")   Wt (!) 169.9 kg (374 lb 9 oz)   SpO2 96%     Exam:  LLE erythema appears similar to presentation.  Superficial oozing wounds in medial left leg a bit worse.    #1 Bilateral leg wounds  #2 Left leg severe cellulitis    Plan:  - Family discussing surgical options.  Source control will likely involve guillotine L through knee amputation, with eventual AKA.  Right side appears more chronic, so could potentially wait on that side for now, or consider debridements, but he is at high risk for AKA on that side as well.  - Will follow up after palliative care consult    "

## 2020-11-03 NOTE — ASSESSMENT & PLAN NOTE
no acute ischemic changes on EKG  Seen by cardiology  No chest pain  Likely related to demand ischemia, Type 2 MI, in the setting of heart failure and sepsis  11/2/20: Repeat ECHO - EF 50%, RVSP 40, mild AS

## 2020-11-04 ENCOUNTER — ANESTHESIA EVENT (OUTPATIENT)
Dept: SURGERY | Facility: MEDICAL CENTER | Age: 55
DRG: 853 | End: 2020-11-04
Payer: MEDICARE

## 2020-11-04 ENCOUNTER — ANESTHESIA (OUTPATIENT)
Dept: SURGERY | Facility: MEDICAL CENTER | Age: 55
DRG: 853 | End: 2020-11-04
Payer: MEDICARE

## 2020-11-04 LAB
ANION GAP SERPL CALC-SCNC: 8 MMOL/L (ref 7–16)
ANISOCYTOSIS BLD QL SMEAR: ABNORMAL
BACTERIA BLD CULT: NORMAL
BARCODED ABORH UBTYP: 6200
BARCODED PRD CODE UBPRD: NORMAL
BARCODED UNIT NUM UBUNT: NORMAL
BASOPHILS # BLD AUTO: 0.8 % (ref 0–1.8)
BASOPHILS # BLD: 0.17 K/UL (ref 0–0.12)
BUN SERPL-MCNC: 36 MG/DL (ref 8–22)
CALCIUM SERPL-MCNC: 8.4 MG/DL (ref 8.5–10.5)
CHLORIDE SERPL-SCNC: 91 MMOL/L (ref 96–112)
CO2 SERPL-SCNC: 31 MMOL/L (ref 20–33)
COMPONENT F 8504F: NORMAL
COMPONENT F 8504F: NORMAL
COMPONENT FT 8504FT: NORMAL
CREAT SERPL-MCNC: 0.95 MG/DL (ref 0.5–1.4)
EOSINOPHIL # BLD AUTO: 0.35 K/UL (ref 0–0.51)
EOSINOPHIL NFR BLD: 1.6 % (ref 0–6.9)
ERYTHROCYTE [DISTWIDTH] IN BLOOD BY AUTOMATED COUNT: 60.2 FL (ref 35.9–50)
GLUCOSE BLD-MCNC: 128 MG/DL (ref 65–99)
GLUCOSE BLD-MCNC: 132 MG/DL (ref 65–99)
GLUCOSE BLD-MCNC: 149 MG/DL (ref 65–99)
GLUCOSE SERPL-MCNC: 144 MG/DL (ref 65–99)
HCT VFR BLD AUTO: 59.9 % (ref 42–52)
HGB BLD-MCNC: 19.3 G/DL (ref 14–18)
INR PPP: 1.54 (ref 0.87–1.13)
LYMPHOCYTES # BLD AUTO: 0.68 K/UL (ref 1–4.8)
LYMPHOCYTES NFR BLD: 3.1 % (ref 22–41)
MACROCYTES BLD QL SMEAR: ABNORMAL
MAGNESIUM SERPL-MCNC: 1.8 MG/DL (ref 1.5–2.5)
MANUAL DIFF BLD: NORMAL
MCH RBC QN AUTO: 29 PG (ref 27–33)
MCHC RBC AUTO-ENTMCNC: 32.2 G/DL (ref 33.7–35.3)
MCV RBC AUTO: 89.9 FL (ref 81.4–97.8)
MONOCYTES # BLD AUTO: 1.02 K/UL (ref 0–0.85)
MONOCYTES NFR BLD AUTO: 4.7 % (ref 0–13.4)
MORPHOLOGY BLD-IMP: NORMAL
NEUTROPHILS # BLD AUTO: 19.55 K/UL (ref 1.82–7.42)
NEUTROPHILS NFR BLD: 86.6 % (ref 44–72)
NEUTS BAND NFR BLD MANUAL: 3.1 % (ref 0–10)
NRBC # BLD AUTO: 0 K/UL
NRBC BLD-RTO: 0 /100 WBC
PLATELET # BLD AUTO: 193 K/UL (ref 164–446)
PLATELET BLD QL SMEAR: NORMAL
PMV BLD AUTO: 9.6 FL (ref 9–12.9)
POTASSIUM SERPL-SCNC: 4.1 MMOL/L (ref 3.6–5.5)
PRODUCT TYPE UPROD: NORMAL
PROTHROMBIN TIME: 18.9 SEC (ref 12–14.6)
RBC # BLD AUTO: 6.66 M/UL (ref 4.7–6.1)
RBC BLD AUTO: PRESENT
SIGNIFICANT IND 70042: NORMAL
SITE SITE: NORMAL
SODIUM SERPL-SCNC: 130 MMOL/L (ref 135–145)
SOURCE SOURCE: NORMAL
UNIT STATUS USTAT: NORMAL
WBC # BLD AUTO: 21.8 K/UL (ref 4.8–10.8)

## 2020-11-04 PROCEDURE — A9270 NON-COVERED ITEM OR SERVICE: HCPCS | Performed by: STUDENT IN AN ORGANIZED HEALTH CARE EDUCATION/TRAINING PROGRAM

## 2020-11-04 PROCEDURE — 700102 HCHG RX REV CODE 250 W/ 637 OVERRIDE(OP): Performed by: STUDENT IN AN ORGANIZED HEALTH CARE EDUCATION/TRAINING PROGRAM

## 2020-11-04 PROCEDURE — 85007 BL SMEAR W/DIFF WBC COUNT: CPT

## 2020-11-04 PROCEDURE — 0Y6G0ZZ DETACHMENT AT LEFT KNEE REGION, OPEN APPROACH: ICD-10-PCS | Performed by: ORTHOPAEDIC SURGERY

## 2020-11-04 PROCEDURE — 700102 HCHG RX REV CODE 250 W/ 637 OVERRIDE(OP): Performed by: INTERNAL MEDICINE

## 2020-11-04 PROCEDURE — 99233 SBSQ HOSP IP/OBS HIGH 50: CPT | Performed by: INTERNAL MEDICINE

## 2020-11-04 PROCEDURE — 770022 HCHG ROOM/CARE - ICU (200)

## 2020-11-04 PROCEDURE — 160048 HCHG OR STATISTICAL LEVEL 1-5: Performed by: ORTHOPAEDIC SURGERY

## 2020-11-04 PROCEDURE — 160028 HCHG SURGERY MINUTES - 1ST 30 MINS LEVEL 3: Performed by: ORTHOPAEDIC SURGERY

## 2020-11-04 PROCEDURE — 700101 HCHG RX REV CODE 250: Performed by: ORTHOPAEDIC SURGERY

## 2020-11-04 PROCEDURE — 64447 NJX AA&/STRD FEMORAL NRV IMG: CPT | Performed by: ORTHOPAEDIC SURGERY

## 2020-11-04 PROCEDURE — L1830 KO IMMOB CANVAS LONG PRE OTS: HCPCS | Performed by: ORTHOPAEDIC SURGERY

## 2020-11-04 PROCEDURE — 700111 HCHG RX REV CODE 636 W/ 250 OVERRIDE (IP): Performed by: STUDENT IN AN ORGANIZED HEALTH CARE EDUCATION/TRAINING PROGRAM

## 2020-11-04 PROCEDURE — P9017 PLASMA 1 DONOR FRZ W/IN 8 HR: HCPCS

## 2020-11-04 PROCEDURE — 83735 ASSAY OF MAGNESIUM: CPT

## 2020-11-04 PROCEDURE — 82962 GLUCOSE BLOOD TEST: CPT | Mod: 91

## 2020-11-04 PROCEDURE — A9270 NON-COVERED ITEM OR SERVICE: HCPCS | Performed by: INTERNAL MEDICINE

## 2020-11-04 PROCEDURE — 85027 COMPLETE CBC AUTOMATED: CPT

## 2020-11-04 PROCEDURE — 36430 TRANSFUSION BLD/BLD COMPNT: CPT

## 2020-11-04 PROCEDURE — 94640 AIRWAY INHALATION TREATMENT: CPT

## 2020-11-04 PROCEDURE — 700111 HCHG RX REV CODE 636 W/ 250 OVERRIDE (IP): Performed by: INTERNAL MEDICINE

## 2020-11-04 PROCEDURE — 160035 HCHG PACU - 1ST 60 MINS PHASE I: Performed by: ORTHOPAEDIC SURGERY

## 2020-11-04 PROCEDURE — 700111 HCHG RX REV CODE 636 W/ 250 OVERRIDE (IP): Performed by: ANESTHESIOLOGY

## 2020-11-04 PROCEDURE — 500054 HCHG BANDAGE, ELASTIC 6: Performed by: ORTHOPAEDIC SURGERY

## 2020-11-04 PROCEDURE — 3E0T3BZ INTRODUCTION OF ANESTHETIC AGENT INTO PERIPHERAL NERVES AND PLEXI, PERCUTANEOUS APPROACH: ICD-10-PCS | Performed by: ANESTHESIOLOGY

## 2020-11-04 PROCEDURE — 700105 HCHG RX REV CODE 258: Performed by: ORTHOPAEDIC SURGERY

## 2020-11-04 PROCEDURE — 85018 HEMOGLOBIN: CPT

## 2020-11-04 PROCEDURE — 88307 TISSUE EXAM BY PATHOLOGIST: CPT

## 2020-11-04 PROCEDURE — 160002 HCHG RECOVERY MINUTES (STAT): Performed by: ORTHOPAEDIC SURGERY

## 2020-11-04 PROCEDURE — 160009 HCHG ANES TIME/MIN: Performed by: ORTHOPAEDIC SURGERY

## 2020-11-04 PROCEDURE — 99232 SBSQ HOSP IP/OBS MODERATE 35: CPT | Mod: GC | Performed by: STUDENT IN AN ORGANIZED HEALTH CARE EDUCATION/TRAINING PROGRAM

## 2020-11-04 PROCEDURE — 160036 HCHG PACU - EA ADDL 30 MINS PHASE I: Performed by: ORTHOPAEDIC SURGERY

## 2020-11-04 PROCEDURE — 501838 HCHG SUTURE GENERAL: Performed by: ORTHOPAEDIC SURGERY

## 2020-11-04 PROCEDURE — 160039 HCHG SURGERY MINUTES - EA ADDL 1 MIN LEVEL 3: Performed by: ORTHOPAEDIC SURGERY

## 2020-11-04 PROCEDURE — 85014 HEMATOCRIT: CPT

## 2020-11-04 PROCEDURE — 88311 DECALCIFY TISSUE: CPT

## 2020-11-04 PROCEDURE — 80048 BASIC METABOLIC PNL TOTAL CA: CPT

## 2020-11-04 PROCEDURE — 500881 HCHG PACK, EXTREMITY: Performed by: ORTHOPAEDIC SURGERY

## 2020-11-04 PROCEDURE — 700101 HCHG RX REV CODE 250: Performed by: ANESTHESIOLOGY

## 2020-11-04 PROCEDURE — 85610 PROTHROMBIN TIME: CPT

## 2020-11-04 RX ORDER — HALOPERIDOL 5 MG/ML
1 INJECTION INTRAMUSCULAR
Status: DISCONTINUED | OUTPATIENT
Start: 2020-11-04 | End: 2020-11-04 | Stop reason: HOSPADM

## 2020-11-04 RX ORDER — HYDROMORPHONE HYDROCHLORIDE 1 MG/ML
0.2 INJECTION, SOLUTION INTRAMUSCULAR; INTRAVENOUS; SUBCUTANEOUS
Status: DISCONTINUED | OUTPATIENT
Start: 2020-11-04 | End: 2020-11-04 | Stop reason: HOSPADM

## 2020-11-04 RX ORDER — HYDROMORPHONE HYDROCHLORIDE 1 MG/ML
0.1 INJECTION, SOLUTION INTRAMUSCULAR; INTRAVENOUS; SUBCUTANEOUS
Status: DISCONTINUED | OUTPATIENT
Start: 2020-11-04 | End: 2020-11-04 | Stop reason: HOSPADM

## 2020-11-04 RX ORDER — ONDANSETRON 2 MG/ML
4 INJECTION INTRAMUSCULAR; INTRAVENOUS
Status: DISCONTINUED | OUTPATIENT
Start: 2020-11-04 | End: 2020-11-04 | Stop reason: HOSPADM

## 2020-11-04 RX ORDER — LIDOCAINE HYDROCHLORIDE 20 MG/ML
INJECTION, SOLUTION EPIDURAL; INFILTRATION; INTRACAUDAL; PERINEURAL PRN
Status: DISCONTINUED | OUTPATIENT
Start: 2020-11-04 | End: 2020-11-04 | Stop reason: SURG

## 2020-11-04 RX ORDER — BUPIVACAINE HYDROCHLORIDE 5 MG/ML
INJECTION, SOLUTION EPIDURAL; INTRACAUDAL
Status: DISCONTINUED | OUTPATIENT
Start: 2020-11-04 | End: 2020-11-04 | Stop reason: HOSPADM

## 2020-11-04 RX ORDER — DEXAMETHASONE SODIUM PHOSPHATE 4 MG/ML
INJECTION, SOLUTION INTRA-ARTICULAR; INTRALESIONAL; INTRAMUSCULAR; INTRAVENOUS; SOFT TISSUE PRN
Status: DISCONTINUED | OUTPATIENT
Start: 2020-11-04 | End: 2020-11-04 | Stop reason: SURG

## 2020-11-04 RX ORDER — SODIUM CHLORIDE 9 MG/ML
INJECTION, SOLUTION INTRAVENOUS CONTINUOUS
Status: DISCONTINUED | OUTPATIENT
Start: 2020-11-04 | End: 2020-11-05

## 2020-11-04 RX ORDER — MAGNESIUM SULFATE HEPTAHYDRATE 40 MG/ML
2 INJECTION, SOLUTION INTRAVENOUS ONCE
Status: COMPLETED | OUTPATIENT
Start: 2020-11-04 | End: 2020-11-04

## 2020-11-04 RX ORDER — HYDROMORPHONE HYDROCHLORIDE 1 MG/ML
0.4 INJECTION, SOLUTION INTRAMUSCULAR; INTRAVENOUS; SUBCUTANEOUS
Status: DISCONTINUED | OUTPATIENT
Start: 2020-11-04 | End: 2020-11-04 | Stop reason: HOSPADM

## 2020-11-04 RX ORDER — CEFAZOLIN SODIUM 1 G/3ML
INJECTION, POWDER, FOR SOLUTION INTRAMUSCULAR; INTRAVENOUS PRN
Status: DISCONTINUED | OUTPATIENT
Start: 2020-11-04 | End: 2020-11-04 | Stop reason: SURG

## 2020-11-04 RX ORDER — DIPHENHYDRAMINE HYDROCHLORIDE 50 MG/ML
12.5 INJECTION INTRAMUSCULAR; INTRAVENOUS
Status: DISCONTINUED | OUTPATIENT
Start: 2020-11-04 | End: 2020-11-04 | Stop reason: HOSPADM

## 2020-11-04 RX ORDER — ONDANSETRON 2 MG/ML
INJECTION INTRAMUSCULAR; INTRAVENOUS PRN
Status: DISCONTINUED | OUTPATIENT
Start: 2020-11-04 | End: 2020-11-04 | Stop reason: SURG

## 2020-11-04 RX ADMIN — DEXAMETHASONE SODIUM PHOSPHATE 4 MG: 4 INJECTION, SOLUTION INTRA-ARTICULAR; INTRALESIONAL; INTRAMUSCULAR; INTRAVENOUS; SOFT TISSUE at 17:37

## 2020-11-04 RX ADMIN — HYDROMORPHONE HYDROCHLORIDE 0.4 MG: 1 INJECTION, SOLUTION INTRAMUSCULAR; INTRAVENOUS; SUBCUTANEOUS at 18:55

## 2020-11-04 RX ADMIN — FENTANYL CITRATE 200 MCG: 50 INJECTION, SOLUTION INTRAMUSCULAR; INTRAVENOUS at 17:50

## 2020-11-04 RX ADMIN — CEFAZOLIN 2 G: 330 INJECTION, POWDER, FOR SOLUTION INTRAMUSCULAR; INTRAVENOUS at 17:31

## 2020-11-04 RX ADMIN — ONDANSETRON 4 MG: 2 INJECTION INTRAMUSCULAR; INTRAVENOUS at 17:37

## 2020-11-04 RX ADMIN — LINEZOLID 600 MG: 600 TABLET, FILM COATED ORAL at 05:27

## 2020-11-04 RX ADMIN — INSULIN HUMAN 1 UNITS: 100 INJECTION, SOLUTION PARENTERAL at 23:46

## 2020-11-04 RX ADMIN — HYDROMORPHONE HYDROCHLORIDE 0.4 MG: 1 INJECTION, SOLUTION INTRAMUSCULAR; INTRAVENOUS; SUBCUTANEOUS at 19:50

## 2020-11-04 RX ADMIN — HYDROCODONE BITARTRATE AND ACETAMINOPHEN 1 TABLET: 10; 325 TABLET ORAL at 02:44

## 2020-11-04 RX ADMIN — HYDROCODONE BITARTRATE AND ACETAMINOPHEN 1 TABLET: 10; 325 TABLET ORAL at 09:34

## 2020-11-04 RX ADMIN — FUROSEMIDE 40 MG: 10 INJECTION, SOLUTION INTRAMUSCULAR; INTRAVENOUS at 14:47

## 2020-11-04 RX ADMIN — DOCUSATE SODIUM 50 MG AND SENNOSIDES 8.6 MG 2 TABLET: 8.6; 5 TABLET, FILM COATED ORAL at 05:27

## 2020-11-04 RX ADMIN — CEFAZOLIN SODIUM 2 G: 2 INJECTION, SOLUTION INTRAVENOUS at 22:36

## 2020-11-04 RX ADMIN — SODIUM CHLORIDE: 9 INJECTION, SOLUTION INTRAVENOUS at 21:45

## 2020-11-04 RX ADMIN — GLYCOPYRROLATE 1 CAPSULE: 15.6 CAPSULE RESPIRATORY (INHALATION) at 07:31

## 2020-11-04 RX ADMIN — HYDROCODONE BITARTRATE AND ACETAMINOPHEN 1 TABLET: 10; 325 TABLET ORAL at 20:40

## 2020-11-04 RX ADMIN — LIDOCAINE HYDROCHLORIDE 100 MG: 20 INJECTION, SOLUTION EPIDURAL; INFILTRATION; INTRACAUDAL at 17:37

## 2020-11-04 RX ADMIN — ROSUVASTATIN CALCIUM 40 MG: 20 TABLET, FILM COATED ORAL at 20:53

## 2020-11-04 RX ADMIN — FENTANYL CITRATE 50 MCG: 50 INJECTION, SOLUTION INTRAMUSCULAR; INTRAVENOUS at 18:59

## 2020-11-04 RX ADMIN — PROPOFOL 150 MG: 10 INJECTION, EMULSION INTRAVENOUS at 17:37

## 2020-11-04 RX ADMIN — MAGNESIUM SULFATE IN WATER 2 G: 40 INJECTION, SOLUTION INTRAVENOUS at 08:04

## 2020-11-04 RX ADMIN — FUROSEMIDE 40 MG: 10 INJECTION, SOLUTION INTRAMUSCULAR; INTRAVENOUS at 05:27

## 2020-11-04 RX ADMIN — CEFAZOLIN SODIUM 2 G: 2 INJECTION, SOLUTION INTRAVENOUS at 05:27

## 2020-11-04 RX ADMIN — CEFAZOLIN SODIUM 2 G: 2 INJECTION, SOLUTION INTRAVENOUS at 14:47

## 2020-11-04 RX ADMIN — AMIODARONE HYDROCHLORIDE 200 MG: 200 TABLET ORAL at 05:27

## 2020-11-04 RX ADMIN — FENTANYL CITRATE 50 MCG: 50 INJECTION, SOLUTION INTRAMUSCULAR; INTRAVENOUS at 18:50

## 2020-11-04 ASSESSMENT — ENCOUNTER SYMPTOMS
ABDOMINAL PAIN: 0
FOCAL WEAKNESS: 0
EYE PAIN: 0
WEAKNESS: 1
CONSTIPATION: 1
POLYDIPSIA: 0
PALPITATIONS: 0
CHILLS: 0
FALLS: 0
BACK PAIN: 0
BRUISES/BLEEDS EASILY: 0
BLURRED VISION: 0
HEADACHES: 0
VOMITING: 0
DIARRHEA: 0
SENSORY CHANGE: 0
NERVOUS/ANXIOUS: 0
SPUTUM PRODUCTION: 0
SHORTNESS OF BREATH: 0
COUGH: 0
SORE THROAT: 0
MYALGIAS: 1
NAUSEA: 0
NERVOUS/ANXIOUS: 1
NECK PAIN: 0
FEVER: 0

## 2020-11-04 ASSESSMENT — PAIN DESCRIPTION - PAIN TYPE
TYPE: CHRONIC PAIN;ACUTE PAIN
TYPE: CHRONIC PAIN;SURGICAL PAIN
TYPE: CHRONIC PAIN
TYPE: CHRONIC PAIN;SURGICAL PAIN
TYPE: CHRONIC PAIN
TYPE: CHRONIC PAIN
TYPE: CHRONIC PAIN;SURGICAL PAIN

## 2020-11-04 ASSESSMENT — PAIN SCALES - GENERAL: PAIN_LEVEL: 2

## 2020-11-04 NOTE — PROGRESS NOTES
"Seen and examined.  Exam findings similar, erythema perhaps a bit worse.  Patient now wants to proceed with surgery, understanding it will likely involve an amputation.  WBC continues to slowly increase.    /81   Pulse 76   Temp 37.2 °C (99 °F) (Bladder)   Resp 16   Ht 1.956 m (6' 5\")   Wt (!) 169.9 kg (374 lb 9 oz)   SpO2 96%     Exam:  LLE erythema a bit worse proximally, purulence continues.  RLE minimal change    #1 Bilateral leg wounds  #2 Left leg severe cellulitis      Plan:  - Possible guillotine L through knee amputation, RLE debridement tomorrow  - NPO p MN  "

## 2020-11-04 NOTE — PROGRESS NOTES
[facility] ICU Progress Note      Admit Date: 10/29/2020    Resident(s): Kady Barraza M.D.   Attending:  SHIVAM KAISER/ Dr. Rafita MD    Patient ID:    Name:  Joshua Medrano   YOB: 1965  Age:  54 y.o.  male   MRN:  [id number]    Diagnosis:  Severe sepsis secondary to severe lower extremity cellulitis  Acute kidney injury     ID:  Mr. Medrano is a 54 y.o. male w/ CAD/CABG 2019, Afib, COPD, DM, and PAD who presented 10/29/2020 with LLE cellulitis, strep sepsis, and severe leg pain. Cardiology, vascular surgery, orthopedic surgery and ID are on board. Patient was transferred to the ICU on 10/31 for worsening cardiac function, dropping sodium, increasing oxygen demands and hypotension in the setting of sepsis.     Hospital Course  10/31 Transferred to ICU  10/31 CT legs bilaterally negative for abscesses or air.   11/2 O2 requirements increasing, now on 10 L, norepinephrine weaned off, signs of persisting sepsis from his cellulitis in LE, WBC 24.9, still refusing surgery, Clinda/Dapto  11/3: Lasix reduced to IV 40 mg BID, patient agreed for surgery, updated orthopedic surgery, IS; palliative consult - Abdoulaye Medrano is the POA  11/4: Orthopedics planning for surgery for his bilateral lower extremities today. Called and updated Dr. Abdoulaye Medrano @[phone], his POA    Consultants:  Critical Care  Orthopedics  Infectious disease  Cardiology  Vascular medicine  Palliative care     Interval Events:  -No acute events overnight  -Patient reports that he has pain in his legs and requesting for pain medication (received Norco  x2, Dialudid IV 0.5 mg x2 yesterday)  -LBM prior to admission.  -Patient understands the plan and need for surgery. Orthopedics is planning on surgery today for bilateral lower extremity cellulitis  -Palliative saw patient on 11/4: Patient chose to go for surgery and Dr. Abdoulaye Medrano to be his POA    Vitals Range last 24h:  Temp:  [37.2 °C (99 °F)-37.5 °C (99.5 °F)] 37.2 °C (99  °F)  Pulse:  [72-81] 76  Resp:  [16-30] 17  BP: ()/() 125/81  SpO2:  [92 %-97 %] 97 %    Intake/Output Summary (Last 24 hours) at 11/4/2020 0836  Last data filed at 11/3/2020 1800  Gross per 24 hour   Intake 1450 ml   Output 3450 ml   Net -2000 ml        Review of Systems   Constitutional: Negative for chills and fever.   HENT: Negative for congestion, hearing loss and sore throat.    Eyes: Negative for blurred vision and pain.   Respiratory: Negative for cough and shortness of breath.    Cardiovascular: Positive for leg swelling. Negative for chest pain and palpitations.   Gastrointestinal: Positive for constipation. Negative for abdominal pain, nausea and vomiting.   Genitourinary: Negative for dysuria and frequency.   Musculoskeletal: Negative for falls and neck pain.   Skin: Negative for itching.        Swelling and redness with ulceration both lower legs, Left >Right   Neurological: Negative for sensory change and headaches.   Endo/Heme/Allergies: Negative for polydipsia. Does not bruise/bleed easily.   Psychiatric/Behavioral: Negative for suicidal ideas. The patient is nervous/anxious.    All other systems reviewed and are negative.    PHYSICAL EXAM:  Vitals:    11/03/20 1700 11/03/20 1800 11/03/20 1819 11/04/20 0731   BP: 127/74 125/81     Pulse: 75 76 76 76   Resp: (!) 23 19 16 17   Temp:       TempSrc:       SpO2:  96% 96% 97%   Weight:       Height:        Body mass index is 44.42 kg/m².    O2 therapy: Pulse Oximetry: 97 %, O2 (LPM): 4, O2 Delivery Device: Silicone Nasal Cannula    Physical Exam  Constitutional:       General: He is not in acute distress.     Appearance: He is obese.   HENT:      Head: Normocephalic and atraumatic.      Nose: Nose normal.      Mouth/Throat:      Mouth: Mucous membranes are moist.   Eyes:      Extraocular Movements: Extraocular movements intact.      Conjunctiva/sclera: Conjunctivae normal.      Pupils: Pupils are equal, round, and reactive to light.   Neck:       Musculoskeletal: Normal range of motion. No muscular tenderness.      Comments: JVD +  Cardiovascular:      Rate and Rhythm: Normal rate and regular rhythm.      Heart sounds: Normal heart sounds. No murmur.   Pulmonary:      Effort: Pulmonary effort is normal. No respiratory distress.      Comments: Decreased breath sounds in bases  Abdominal:      General: Bowel sounds are normal. There is no distension.      Palpations: Abdomen is soft. There is no mass.      Tenderness: There is no abdominal tenderness.      Hernia: A hernia (right umbilical hernia +, reducible) is present.   Musculoskeletal:         General: Swelling and tenderness present.      Right lower leg: Edema present.      Left lower leg: Edema present.      Comments: Bilateral lower extremity redness, swelling, warmth, tenderness, with ulcerations - L>R. No palpable crepitus. Feeble pedal pulse on left. Both legs wrapped in bandages   Skin:     General: Skin is warm.      Capillary Refill: Capillary refill takes 2 to 3 seconds.   Neurological:      Mental Status: He is alert and oriented to person, place, and time. Mental status is at baseline.   Psychiatric:         Behavior: Behavior normal.         Thought Content: Thought content normal.         Judgment: Judgment normal.      Comments: Anxious about the surgery       Meds:  • magnesium sulfate  2 g 2 g (11/04/20 0804)   • furosemide  40 mg     • MD Alert...Adult ICU Electrolyte Replacement per Pharmacy       • rosuvastatin  40 mg     • ceFAZolin  2 g Stopped (11/04/20 0557)   • linezolid  600 mg     • norepinephrine (Levophed) infusion  0-30 mcg/min Stopped (11/01/20 0905)   • HYDROmorphone  0.5-1 mg     • albuterol  2 Puff     • amiodarone  200 mg     • ipratropium-albuterol  3 mL     • glycopyrrolate  1 Cap     • senna-docusate  2 Tab      And   • polyethylene glycol/lytes  1 Packet      And   • magnesium hydroxide  30 mL      And   • bisacodyl  10 mg     • Respiratory Therapy Consult       •  cloNIDine  0.1 mg     • acetaminophen  500 mg     • HYDROcodone/acetaminophen  1 Tab     • insulin regular  1-6 Units      And   • glucose  16 g      And   • dextrose 50%  50 mL          Procedures:  10/29: Central line, RIJ    Labs:  Most Recent Labs:    Lab Results   Component Value Date/Time    WBC 21.8 (H) 11/04/2020 05:35 AM    RBC 6.66 (H) 11/04/2020 05:35 AM    HEMOGLOBIN 19.3 (H) 11/04/2020 05:35 AM    HEMATOCRIT 59.9 (H) 11/04/2020 05:35 AM    MCV 89.9 11/04/2020 05:35 AM    MCH 29.0 11/04/2020 05:35 AM    MCHC 32.2 (L) 11/04/2020 05:35 AM    MPV 9.6 11/04/2020 05:35 AM    NEUTSPOLYS 86.60 (H) 11/04/2020 05:35 AM    LYMPHOCYTES 3.10 (L) 11/04/2020 05:35 AM    MONOCYTES 4.70 11/04/2020 05:35 AM    EOSINOPHILS 1.60 11/04/2020 05:35 AM    EOSINOPHILS 2 11/07/2018 09:34 AM    BASOPHILS 0.80 11/04/2020 05:35 AM    HYPOCHROMIA 1+ 09/10/2019 08:09 PM    ANISOCYTOSIS 1+ 11/04/2020 05:35 AM      Lab Results   Component Value Date/Time    SODIUM 130 (L) 11/04/2020 05:35 AM    POTASSIUM 4.1 11/04/2020 05:35 AM    CHLORIDE 91 (L) 11/04/2020 05:35 AM    CO2 31 11/04/2020 05:35 AM    GLUCOSE 144 (H) 11/04/2020 05:35 AM    BUN 36 (H) 11/04/2020 05:35 AM    CREATININE 0.95 11/04/2020 05:35 AM      Lab Results   Component Value Date/Time    ALTSGPT 34 10/30/2020 06:53 AM    ASTSGOT 50 (H) 10/30/2020 06:53 AM    ALKPHOSPHAT 123 (H) 10/30/2020 06:53 AM    TBILIRUBIN 1.4 10/30/2020 06:53 AM    LIPASE 546 (H) 09/12/2019 10:54 AM    ALBUMIN 2.6 (L) 10/30/2020 06:53 AM    GLOBULIN 4.2 (H) 10/30/2020 06:53 AM    PREALBUMIN 10.0 (L) 01/29/2018 03:58 AM    INR 1.54 (H) 11/04/2020 05:35 AM    MACROCYTOSIS 1+ 11/04/2020 05:35 AM     Lab Results   Component Value Date/Time    PROTHROMBTM 18.9 (H) 11/04/2020 05:35 AM    INR 1.54 (H) 11/04/2020 05:35 AM      Imaging:  EC-ECHOCARDIOGRAM LTD W/ CONT   Final Result      DX-CHEST-LIMITED (1 VIEW)   Final Result      1.  Right internal jugular catheter is been placed and appears appropriately  located      2.  Moderate pulmonary edema      3.  Enlarged cardiac silhouette      CT-EXTREMITY, LOWER W/O RIGHT   Final Result      1.  No discrete fluid collection is identified to suggest abscess.      2.  Diffuse subcutaneous edema and overlying skin thickening with venous varicosities.      3.  Atherosclerosis.      4.  Diffuse muscle atrophy.      CT-EXTREMITY, LOWER W/O LEFT   Final Result      1.  Left leg cellulitis without abscess or soft tissue gas      2.  Small vessel atherosclerotic plaque      3.  osteoarthritis of the left knee and left midfoot      4.  Muscular atrophy      EC-ECHOCARDIOGRAM COMPLETE W/O CONT   Final Result      DX-CHEST-PORTABLE (1 VIEW)   Final Result         1.  Pulmonary edema and/or infiltrates are identified, which are stable since the prior exam.   2.  Cardiomegaly      US-EXTREMITY ARTERY LOWER BILAT   Final Result      US-EXTREMITY VENOUS LOWER BILAT   Final Result      DX-TIBIA AND FIBULA RIGHT   Final Result         1.  No acute traumatic bony injury.   2.  Tricompartmental degenerative changes of the knee   3.  Atherosclerosis      DX-TIBIA AND FIBULA LEFT   Final Result   Addendum 1 of 1   Addendum: Subtle clustered punctate lucencies lateral to the ankle are    seen which could represent tiny foci of soft tissue gas.      These findings were discussed with the patient's clinician, ELIZABETH HILARIO,    on 10/30/2020 12:34 AM.      Final      DX-CHEST-PORTABLE (1 VIEW)   Final Result         1.  Interstitial pulmonary parenchymal prominence, compatible with interstitial edema and/or infiltrates.   2.  Cardiomegaly        ASSESSEMENT and PLAN:  * Cellulitis  Assessment & Plan  -Severe cellulitis of bilateral lower extremities  -? Necrotizing fasciitis but CT negative  -Continue antibiotics per ID recs, currently on cefazolin and linezolid  -Vascular surgery, Orthopedics and ID are following  -Pain management - PO Norco and IV dilaudid PRN  -11/3: Patient agrees to surgery,  updated orthopedics  -11/4: Ortho planning surgery today (possible L AKA and right I&D)    Sepsis (HCC)  Assessment & Plan  -Likely from infection in bilateral lower extremities  -? Necrotizing fasciitis but CT negative  -Orthopedic surgery,Vascular surgery,ID - following  -10/29: BC - GAS; repeat BC 11/1: NGTD  -10/30: WC - GAS, MSSA    PLAN:  -Leukocytosis- worsening  -11/2: Cefazolin and Linezolid  -Follow blood cultures  -Maintain MAP > 65  -Off pressors  -11/3:Patient agreeing for surgery today, updated orthopedics team for further plan    High anion gap metabolic acidosis  Assessment & Plan  -RESOLVED  -Likely from sepsis and poor perfusion    Supratherapeutic INR- (present on admission)  Assessment & Plan  REsOLVED  -At admission INR 4.46, Likely from sepsis  -Warfarin held on admission  -11/3: INR 1.93  -Will restart after surgery    Paroxysmal atrial fibrillation (HCC)- (present on admission)  Assessment & Plan  -Controlled on metoprolol, amiodarone  -[id number]: 4 points; HASBLED: 2 points.  -On warfarin with supratherapeutic INR (4.46 10/30/2020); likely from sepsis    PLAN:  -Continue amiodarone  -11/3: Metoprolol 12.5 mg BID  -Will restart anti-coagulation after surgery  -Goal for K>4, Mg>2    LINSEY (acute kidney injury) (HCC)  Assessment & Plan  -Improving  -Likely ATN from sepsis vs cardiorenal  -11/2: Continue diuresis 80 mg BID per cardiology  -Monitor UOP, I&Os  -Avoid nephrotoxins    Elevated LFTs  Assessment & Plan  -Likely from sepsis  -Monitor CMP    Polycythemia  Assessment & Plan  -Chronic  -Likely 2/2 chronic hypoxia due to COPD vs SHASHANK  -Daily labs as inpatient  -F/U with PCP for outpatient sleep studies    Elevated troponin- (present on admission)  Assessment & Plan  -with elevated BNP, no acute ischemic changes on EKG  -Demand ischemia, Type 2 MI, in the setting of heart failure and sepsis  -11/2/20: Repeat ECHO - EF 50%, RVSP 40, mild AS    Morbid obesity with BMI of 40.0-44.9, adult  ([facility])- (present on admission)  Assessment & Plan  -BMI 44.42  -Encourage mobility  -Weight loss counseling when appropriate    Heart failure with preserved ejection fraction, borderline, class IV ([facility])- (present on admission)  Assessment & Plan  -Continue diuresis, lasix changed to IV lasix 40 mg BID on 11/3/2020  -Hold metoprolol for now  -Will restart after surgery    Type 2 diabetes mellitus with complication, without long-term current use of insulin ([facility])- (present on admission)  Assessment & Plan  -HbA1C 7.8  -ISS  -Hypoglycemia protocol    COPD (chronic obstructive pulmonary disease) ([facility])  Assessment & Plan  Stable  Continue home inhalers  Supplemental O2  RT protocol    Hyponatremia  Assessment & Plan  IVC dilated on ECHO  Continue diuresis  Monitor sodium    DISPO: ICU for close hemodynamic monitoring    CODE STATUS: FULL CODE    Quality Measures:  Feeding: PO cardiac diet  Analgesia: PO Norco and IV dilaudid  Sedation: None  Thromboprophylaxis: No pharmacological - held for elevated INR and now for surgery   Head of bed: >30 degrees  Ulcer prophylaxis: None  Glycemic control: 4U regular  Bowel care: bowel regimen per protocol  Indwelling lines: [facility]  Deescalation of antibiotics: Cefazolin and Linezolid     Kady Barraza M.D.

## 2020-11-04 NOTE — PALLIATIVE CARE
To locate the AD/POLST, please hover the cursor over the patient's code status to find all linked ACP documents.    With pt's permission placed AD in hard chart. Called pt's nephew Abdoulaye and left a message with Palliative care's  number and asked if he would like a copy of the AD. Pt preferred PC RN to speak with Abdoulaye about AD

## 2020-11-04 NOTE — PROGRESS NOTES
Critical Care Progress Note    Date of admission  10/29/2020    Chief Complaint  54 y.o. male who presented 10/29/2020 with lower extremity infection bilateral that appears to be nec fascitis.  PMH cabg 2019, pafib, heart failure with normal ef, rosanna and copd.  He had an episode of hypoxia this am but now on 2 L and sating adequately. sbp 90-100s.  BNP elevated. CHest xray with edema.  He has been receiving fluids for sepsis. ECHO appears to have reduced ef.  He is alert and oriented, rr mid 20s.  Erythema and swelling to upper thighs bilateral. He is on antibiotics.  Patient currently wants to talk to his brother regarding continued medical therapy and defers ICU transfer currently.  I discussed goals of care and he would like to discuss them with his brother, currently he would rather not be intubated but is not definitive on code status.  He also had hyponatremia to 123.  I have ordered a jenkins catheter and lasix. Lactic acid is normal.  On linezolid, cefepime and flagyl.  Vascular and orthopedics have been consulted and likely will need amputation.  Thrombocytopenia likely from sepsis.      Hospital Course  10/31 admitted to ICU  10/31 CT legs bilaterally negative for abscesses or air.   11/2 O2 requirements increasing, now on 10 L, norepinephrine weaned off, signs of persisting sepsis from his cellulitis in LE, WBC 24.9, still refusing surgery, Clinda/Dapto  11/3 patient now willing to undergo surgery, orthopedic follow-up pending, oxygenation improved, patient off pressors  11/4 Going to OR for Amputation    Interval Problem Update  Reviewed last 24 hour events    A&O x4  Still significant pain in both lower extremities, worse on the left  SR 70s  SBp 110-130s  UO good  Renal function improved  -2.95 L / 24 hours  Night shift not included in fluid balance?  -22.9 L admit running balance, lower extremity edema improved  Tm 99.5  WBC 21.8  4 lpm NC  NPO for OR  Ancef/Zyvox  INR 1.54  Glucose and SSI  reviewed      To OR w/Ortho for amputation LLE /debredment  RLE  Reassess post op  Mg replete  Insulin drip?    Reviewed with the patient the possibility of coming back from the operating room on the ventilator       YESTERDAY  A&O x4  SR 70s  SBp 130s  UO awesome  I/Os neg 10.6L / 24 hrs  LE pain - norco/dilaudid  Taking PO  Tm 99.5  Pt now ok w/Surgery  4 lpm  INR <2  Ancef/Zyvox  Lasix 80 twice daily  Blood sugar running around 200    Change lasix 40 Bid  Monitor for the need to change insulin 180 continuous infusion protocol, may need it postop  Call Ortho for follow-up, patient now willing to have surgical therapy  IS every hour while awake for pulmonary toilet  Resume some home meds as clinically appropriate  Keep in ICU, high risk for deterioration and likely need to come back in a ventilator postop    Review of Systems  Review of Systems   Constitutional: Positive for malaise/fatigue (Persisting/slight better?). Negative for fever.   HENT: Negative for congestion and sore throat.    Respiratory: Negative for cough, sputum production and shortness of breath.    Cardiovascular: Positive for leg swelling (No change, worse on the left). Negative for chest pain.   Gastrointestinal: Negative for abdominal pain, diarrhea, nausea and vomiting.   Genitourinary: Negative for dysuria and urgency.   Musculoskeletal: Positive for myalgias (Worse in the left lower extremity, it is in both lower extremities). Negative for back pain.        Leg pain bilaterally   Skin: Negative for rash.   Neurological: Positive for weakness (Diffuse). Negative for focal weakness and headaches.   Psychiatric/Behavioral: The patient is not nervous/anxious.    All other systems reviewed and are negative.  No change    Vital Signs for last 24 hours   Temp:  [36.1 °C (97 °F)-37.2 °C (99 °F)] 36.1 °C (97 °F)  Pulse:  [72-96] 80  Resp:  [12-27] 17  BP: (103-145)/(54-85) 121/76  SpO2:  [91 %-98 %] 97 %    Hemodynamic parameters for last 24 hours        Respiratory Information for the last 24 hours       Physical Exam   Physical Exam  Vitals signs reviewed.   Constitutional:       General: He is not in acute distress.     Appearance: He is morbidly obese. He is not ill-appearing, toxic-appearing or diaphoretic.      Interventions: Face mask in place.   HENT:      Head: Normocephalic and atraumatic.      Mouth/Throat:      Mouth: Mucous membranes are moist.   Eyes:      General: No scleral icterus.     Extraocular Movements: Extraocular movements intact.      Pupils: Pupils are equal, round, and reactive to light.   Neck:      Musculoskeletal: Neck supple.      Vascular: No JVD.      Trachea: Phonation normal.   Cardiovascular:      Rate and Rhythm: Normal rate and regular rhythm.      Pulses: Normal pulses.      Heart sounds: Normal heart sounds. No murmur.      Comments: SR w/PVCs  Pulmonary:      Effort: Pulmonary effort is normal. No accessory muscle usage or respiratory distress.      Breath sounds: Normal breath sounds. No wheezing or rales.      Comments: Mildly dyspneic  Abdominal:      General: Abdomen is protuberant. Bowel sounds are normal. There is no distension.      Palpations: Abdomen is soft.      Tenderness: There is no abdominal tenderness. There is no right CVA tenderness, left CVA tenderness or guarding.   Musculoskeletal:         General: No swelling.      Comments: Bilateral lower leg extremities - erythema, edematous, tender to touch. Kerlix dressing wrapped around the legs, worse left lower extremity to mid thigh versus right, no change   Skin:     General: Skin is warm and dry.      Capillary Refill: Capillary refill takes 2 to 3 seconds.      Coloration: Skin is not cyanotic or jaundiced.      Findings: Ecchymosis, erythema (Slight worse in the left lower extremity) and wound (Bilateral lower extremities) present.      Nails: There is no clubbing.     Neurological:      General: No focal deficit present.      Mental Status: He is alert  and oriented to person, place, and time.      Cranial Nerves: No cranial nerve deficit.      Motor: Weakness (Nonfocal/diffuse) present.      Comments: Follows well   Psychiatric:         Attention and Perception: Attention normal.         Speech: Speech normal.         Behavior: Behavior is not agitated. Behavior is cooperative.         Thought Content: Thought content normal.         Cognition and Memory: Cognition normal.         Medications  Current Facility-Administered Medications   Medication Dose Route Frequency Provider Last Rate Last Admin   • ondansetron (ZOFRAN) syringe/vial injection 4 mg  4 mg Intravenous Once PRN Riccardo Lima M.D.       • haloperidol lactate (HALDOL) injection 1 mg  1 mg Intravenous Q15 MIN PRROSANA Lima M.D.       • diphenhydrAMINE (BENADRYL) injection 12.5 mg  12.5 mg Intravenous Q15 MIN PRROSANA Lima M.D.       • fentaNYL (SUBLIMAZE) injection 25 mcg  25 mcg Intravenous Q2 MIN PRROSANA Lima M.D.   50 mcg at 11/04/20 1859    Or   • fentaNYL (SUBLIMAZE) injection 50 mcg  50 mcg Intravenous Q2 MIN PRROSANA Lima M.D.   50 mcg at 11/04/20 1850   • HYDROmorphone pf (DILAUDID) injection 0.1 mg  0.1 mg Intravenous Q5 MIN PRROSANA Lima M.D.        Or   • HYDROmorphone pf (DILAUDID) injection 0.2 mg  0.2 mg Intravenous Q5 MIN PRROSANA Lima M.D.        Or   • HYDROmorphone pf (DILAUDID) injection 0.4 mg  0.4 mg Intravenous Q5 MIN PRROSANA Lima M.D.   0.4 mg at 11/04/20 1950   • [MAR Hold] furosemide (LASIX) injection 40 mg  40 mg Intravenous BID DIURETIC Adrianna Fabian M.D.   40 mg at 11/04/20 1447   • [MAR Hold] MD Alert...ICU Electrolyte Replacement per Pharmacy   Other PHARMACY TO DOSE Silvio Eller M.D.       • [MAR Hold] rosuvastatin (CRESTOR) tablet 40 mg  40 mg Oral QHS Silvio Eller M.D.   40 mg at 11/03/20 2222   • [MAR Hold] ceFAZolin in dextrose (ANCEF) IVPB premix 2 g  2 g Intravenous Q8HRS Carol Barber M.D.    Stopped at 11/04/20 1517   • [MAR Hold] linezolid (ZYVOX) tablet 600 mg  600 mg Oral Q12HRS Carol Barber M.D.   600 mg at 11/04/20 0527   • norepinephrine (Levophed) infusion 8 mg/250 mL (premix)  0-30 mcg/min Intravenous Continuous Jodi Small M.D.   Stopped at 11/01/20 0905   • [MAR Hold] HYDROmorphone pf (DILAUDID) injection 0.5-1 mg  0.5-1 mg Intravenous Q2HRS PRN Meng Seymour M.D.   1 mg at 11/03/20 1315   • [MAR Hold] albuterol inhaler 2 Puff  2 Puff Inhalation Q6HRS PRN Jayro Vásquez M.D.   2 Puff at 11/01/20 1901   • [MAR Hold] amiodarone (Cordarone) tablet 200 mg  200 mg Oral QAHUI Vásquez M.D.   200 mg at 11/04/20 0527   • [MAR Hold] ipratropium-albuterol (DUONEB) nebulizer solution  3 mL Nebulization Q6HRS PRN (RT) Jayro Vásquez M.D.       • [MAR Hold] glycopyrrolate (Seebri) 15.6 mcg inhalation capsule 1 Cap  1 Cap Inhalation BID (RT) Jayro Vásquez M.D.   1 Cap at 11/04/20 0731   • [MAR Hold] senna-docusate (PERICOLACE or SENOKOT S) 8.6-50 MG per tablet 2 Tab  2 Tab Oral BID Artem Givens M.D.   2 Tab at 11/04/20 0527    And   • [MAR Hold] polyethylene glycol/lytes (MIRALAX) PACKET 1 Packet  1 Packet Oral QDAY PRN Artem Givens M.D.   1 Packet at 11/03/20 1038    And   • [MAR Hold] magnesium hydroxide (MILK OF MAGNESIA) suspension 30 mL  30 mL Oral QDAY PRN Artem Givens M.D.        And   • [MAR Hold] bisacodyl (DULCOLAX) suppository 10 mg  10 mg Rectal QDAY PRN Artem Givens M.D.       • [MAR Hold] Respiratory Therapy Consult   Nebulization Continuous RT Artem Givens M.D.       • [MAR Hold] cloNIDine (CATAPRES) tablet 0.1 mg  0.1 mg Oral Q6HRS PRN Artem Givens M.D.       • [MAR Hold] acetaminophen (TYLENOL) tablet 500 mg  500 mg Oral Q6HRS PRN Artem Givens M.D.       • [MAR Hold] HYDROcodone/acetaminophen (NORCO)  MG per tablet 1 Tab  1 Tab Oral Q6HRS PRN Artem Givens M.D.   1 Tab at 11/04/20 0934   • [MAR Hold] insulin regular (HumuLIN R,NovoLIN R) injection  1-6 Units  Subcutaneous Q6HRS Artem Givens M.D.   Stopped at 11/04/20 0532    And   • [MAR Hold] glucose 4 g chewable tablet 16 g  16 g Oral Q15 MIN PRN Artem Givens M.D.        And   • [MAR Hold] dextrose 50% (D50W) injection 50 mL  50 mL Intravenous Q15 MIN PRN Artem Givens M.D.           Fluids    Intake/Output Summary (Last 24 hours) at 11/4/2020 2007  Last data filed at 11/4/2020 1843  Gross per 24 hour   Intake 48.33 ml   Output 3790 ml   Net -3741.67 ml       Laboratory          Recent Labs     11/02/20 0345 11/03/20 0445 11/04/20  0535   SODIUM 130* 126* 130*   POTASSIUM 4.1 3.6 4.1   CHLORIDE 95* 88* 91*   CO2 24 30 31   BUN 47* 41* 36*   CREATININE 1.55* 1.16 0.95   MAGNESIUM 1.7 1.7 1.8   CALCIUM 7.9* 8.2* 8.4*     Recent Labs     11/02/20 0345 11/03/20 0445 11/04/20  0535   GLUCOSE 185* 192* 144*     Recent Labs     11/02/20 0345 11/03/20 0445 11/04/20  0535   WBC 24.8* 25.5* 21.8*   NEUTSPOLYS 95.00* 80.80* 86.60*   LYMPHOCYTES 1.70* 1.70* 3.10*   MONOCYTES 1.70 12.50 4.70   EOSINOPHILS 0.00 1.70 1.60   BASOPHILS 0.00 0.80 0.80     Recent Labs     11/02/20 0345 11/03/20 0445 11/04/20  0535   RBC 6.42* 6.62* 6.66*   HEMOGLOBIN 18.7* 19.2* 19.3*   HEMATOCRIT 57.1* 58.9* 59.9*   PLATELETCT 156* 187 193   PROTHROMBTM 33.8* 22.6* 18.9*   INR 3.21* 1.93* 1.54*       Imaging  X-Ray:  I have personally reviewed the images and compared with prior images.  CT:    Reviewed      CT leg : IMPRESSION:  1.  No discrete fluid collection is identified to suggest abscess.  2.  Diffuse subcutaneous edema and overlying skin thickening with venous varicosities.  3.  Atherosclerosis.  4.  Diffuse muscle atrophy.    Assessment/Plan  * Cellulitis  Assessment & Plan  Severe bilateral leg cellulitis. Initial concern for necrotizing fascitis  CT legs bilaterally did not show any evidence of fluid collection/abscess or soft tissue gas.   His erythema not appear worsening compared to yesterday/edema worse  Patient initially on  clindamycin and daptomycin, transitioned to Ancef and daptomycin ABX per ID  Continue forced diuresis and elevation  Pt initially refusing surgery at this time.    Dr Cole had a very long discussion with him and daughter about his critical condition. He explained about his severe cellulitis that could potentially get worsened if not treated appropriately. All questions were answered. He also explained about our limited visitation hours due to COVID pandemic. Family didn't seem to support our visitation hours. He offered an apology. He did his best to explain to them about his critical condition and visitation hours. His daughter seems to understand the situation    Patient subsequently with further discussions agreeable for surgery from orthopedics, OR planned for today 11/4    Right-sided heart failure (HCC)  Assessment & Plan  Tolerating forced diuresis with good response, -20+ liter fluid balance so far  SHASHANK?  Strongly suspected  Echocardiogram noted, RVSP 40 consistent with mild-moderate pulmonary hypertension    Sepsis (HCC)  Assessment & Plan  Sepsis due to lower ext infection Likely nec fasc clinically but CT negative for usual abnormalities, slightly worse on left lower extremity on exam    Ortho and vascular consulted, patient now agreeable to proceed with amputation that is recommended with left lower extremity AKA, orthopedics taken the patient to the OR 11/4  Cellulitis clinically perhaps worse in the left lower extremity certainly not resolving quickly in both lower extremities  Pressors to support MAP >65 and SBP >90, norepinephrine infusion as needed, off for now  Diuresis as tolerated when clinically appropriate  Antibiotics, ID consult reviewed, deescalated to Ancef/linezolid  Serial cultures wound/blood as needed  Hopefully new cultures from the OR 11/4 and fine-tune antibiotic coverage as clinically prudent  Monitor for inflammatory response perioperatively and the need for pressors and further  fluid resuscitation, additionally, patient may come back on the ventilator    High anion gap metabolic acidosis  Assessment & Plan  Likely from poor perfusion  Improved    Supratherapeutic INR- (present on admission)  Assessment & Plan  Likely from sepsis  Continue to monitor  Transfuse/vitamin K as clinically prudent per protocols    Paroxysmal atrial fibrillation (HCC)- (present on admission)  Assessment & Plan  Cardiac monitoring  On amiodarone and metoprolol, hold metoprolol for low blood pressure-treat with dig if needed  Continue to optimize electrolytes  Anticoagulate as clinically appropriate, holding since he is going to the OR, resume postop when okay with surgery    LINSEY (acute kidney injury) (HCC)  Assessment & Plan  Improved again  Acute kidney injury 2/2 likely ATN due to sepsis  Lasix reduced to 40 mg twice daily, still diuresing nicely  Monitor UOP closely, strict I/Os      Elevated LFTs  Assessment & Plan  Likely from poor perfusion  Serial CMP  Avoid hepatotoxins    Polycythemia  Assessment & Plan  Serial CBC  O2 supplementation, PAP therapy as needed  Eval for SHASHANK when clinically appropriate  Phlebotomy unlikely necessary now, patient going for probable significant amputation  Continue to monitor    Elevated troponin- (present on admission)  Assessment & Plan  Likely type 2 MI from poor perfusion from sepsis, heart failure  ECHO noted, repeat pending  Monitor    Morbid obesity with BMI of 40.0-44.9, adult ([facility])- (present on admission)  Assessment & Plan  Behavioral modification weight loss encouraged long-term  Much of the weight is water weight, ongoing diuresis  SHASHANK evaluation as an outpatient  Most recent TSH normal    Heart failure with preserved ejection fraction, borderline, class IV ([facility])- (present on admission)  Assessment & Plan  Prior hx, current echo appears reduced ef?  Diuresis as tolerated  On metoprolol      Type 2 diabetes mellitus with complication, without long-term current use of  insulin (HCC)- (present on admission)  Assessment & Plan  Continue SSI, consider Lantus however suspect he may be going to the OR soon, hold for now  Monitor for the need for continuous insulin infusion, may need it postop  Hypoglycemia protocols  A1c 7.8    COPD (chronic obstructive pulmonary disease) (HCC)  Assessment & Plan  Not in exacerbation  duonebs prn  Albuterol prn  On seebri bid    Hyponatremia  Assessment & Plan  Persisting, mildly improved  Reassess volume status daily  Lasix/fluid restriction as tolerated  Low EF  Continue serial BMP       VTE:  Heparin  Ulcer: Not Indicated  Lines: Central Line  Ongoing indication addressed and Ceballos Catheter  Ongoing indication addressed    I have performed a physical exam and reviewed and updated ROS and Plan today (11/4/2020). In review of yesterday's note (11/3/2020), there are no changes except as documented above.     Discussed patient condition and risk of morbidity and/or mortality with RN, RT, Pharmacy, [employer] [facility] resident, Charge nurse / hot rounds, Patient and orthopedics    Expect the patient to come back from the operating room on the ventilator, monitor closely for the need to escalate sliding scale insulin to insulin drip, may need pressors if he has a significant inflammatory response, all reviewed with nursing staff, patient and night team

## 2020-11-04 NOTE — THERAPY
Physical Therapy Contact Note    Pending ortho sx today; amp possible; will follow up post op for accurate assessment of PT needs;    Amber Levine, PT,  [phone]

## 2020-11-04 NOTE — PROGRESS NOTES
"                                               Cadiology Progress Note    Interval History:  11/2: Appears tired, on 10 L oxygen this morning. Denies any SOB/ chest pain. UOP 9.4L in last 24 hours.   11/03: Appears same as yesterday. On 6 L Oxygen this AM. UOP 10 L in last 24 hours. Left lower extremity appears more swelling and more erythematous, spreading proximally.   11/04: Getting surgery today. Still on 6 L oxygen. Denies any pain this AM    Physical Exam   Blood pressure 107/62, pulse 72, temperature 37 °C (98.6 °F), temperature source Bladder, resp. rate 18, height 1.956 m (6' 5\"), weight (!) 169.9 kg (374 lb 9 oz), SpO2 96 %.    Constitutional: Appears chronically ill, obese  HENT: Normocephalic and atraumatic. No scleral icterus.   Neck: JVD undetectable due to body habitus  Cardiovascular: Normal rate, irregular rythym. Exam reveals no gallop and no friction rub. + Systolic murmur  Pulmonary/Chest: Diminished breath sound bilaterally  Abdominal: S/NT/ND BS  Musculoskeletal: 2+ edema bilaterally, erythematous legs. Left leg more red, more swollen, spreading proximally. Legs wrapped upto knee.   Skin: Skin is warm and dry.           Intake/Output Summary (Last 24 hours) at 11/4/2020 1439  Last data filed at 11/4/2020 1400  Gross per 24 hour   Intake 598.33 ml   Output 3500 ml   Net -2901.67 ml       Recent Labs     11/02/20  0345 11/03/20  0445 11/04/20  0535   WBC 24.8* 25.5* 21.8*   RBC 6.42* 6.62* 6.66*   HEMOGLOBIN 18.7* 19.2* 19.3*   HEMATOCRIT 57.1* 58.9* 59.9*   MCV 88.9 89.0 89.9   MCH 29.1 29.0 29.0   MCHC 32.7* 32.6* 32.2*   RDW 59.7* 59.6* 60.2*   PLATELETCT 156* 187 193   MPV 11.1 10.7 9.6     Recent Labs     11/02/20  0345 11/03/20  0445 11/04/20  0535   SODIUM 130* 126* 130*   POTASSIUM 4.1 3.6 4.1   CHLORIDE 95* 88* 91*   CO2 24 30 31   GLUCOSE 185* 192* 144*   BUN 47* 41* 36*   CREATININE 1.55* 1.16 0.95   CALCIUM 7.9* 8.2* 8.4*     Recent Labs     11/02/20  0345 11/03/20  0445 " 11/04/20  0535   INR 3.21* 1.93* 1.54*                   No current facility-administered medications on file prior to encounter.      Current Outpatient Medications on File Prior to Encounter   Medication Sig Dispense Refill   • warfarin (COUMADIN) 5 MG Tab TAKE 1 & 1/2 (ONE & ONE-HALF) TABLETS BY MOUTH ONCE DAILY OR  AS  DIRECTED  BY  COUMADIN  CLINIC 45 Tab 0   • rosuvastatin (CRESTOR) 40 MG tablet Take 1 Tab by mouth every morning. 90 Tab 3   • furosemide (LASIX) 40 MG Tab Take 1 Tab by mouth every morning. 90 Tab 3   • spironolactone (ALDACTONE) 25 MG Tab Take 1 Tab by mouth every morning. 90 Tab 3   • metoprolol SR (TOPROL XL) 25 MG TABLET SR 24 HR Take 1 Tab by mouth every morning. 90 Tab 3   • amiodarone (CORDARONE) 200 MG Tab Take 1 Tab by mouth every morning. 90 Tab 3   • lisinopril (PRINIVIL) 2.5 MG Tab Take 1 Tab by mouth every morning. 90 Tab 3   • potassium chloride SA (K-DUR) 10 MEQ Tab CR Take 1 Tab by mouth every morning. 90 Each 3   • acetaminophen (TYLENOL) 325 MG Tab Take 2 Tabs by mouth every 6 hours as needed (Mild Pain; (Pain scale 1-3); Temp greater than 100.5 F). 30 Tab 0   • albuterol 108 (90 Base) MCG/ACT Aero Soln inhalation aerosol Inhale 2 Puffs by mouth every 6 hours as needed for Shortness of Breath.     • ferrous sulfate 325 (65 Fe) MG tablet Take 325 mg by mouth every morning.     • polyethylene glycol/lytes (MIRALAX) Pack Take 17 g by mouth every morning.     • tiotropium (SPIRIVA) 18 MCG Cap Inhale 18 mcg by mouth every morning.     • ipratropium-albuterol (DUONEB) 0.5-2.5 (3) MG/3ML nebulizer solution 3 mL by Nebulization route every 6 hours as needed for Shortness of Breath. 180 Bullet 10       Current Facility-Administered Medications   Medication Dose Frequency Provider Last Rate Last Admin   • furosemide (LASIX) injection 40 mg  40 mg BID DIURETIC Adrianna Fabian M.D.   40 mg at 11/04/20 0527   • MD Alert...ICU Electrolyte Replacement per Pharmacy   PHARMACY TO DOSE Silvio MARKS  LISSA Eller       • rosuvastatin (CRESTOR) tablet 40 mg  40 mg QHS Silvio Eller M.D.   40 mg at 11/03/20 2222   • ceFAZolin in dextrose (ANCEF) IVPB premix 2 g  2 g Q8HRS Carol Barber M.D.   Stopped at 11/04/20 0557   • linezolid (ZYVOX) tablet 600 mg  600 mg Q12HRS Carol Barber M.D.   600 mg at 11/04/20 0527   • norepinephrine (Levophed) infusion 8 mg/250 mL (premix)  0-30 mcg/min Continuous Jodi Small M.D.   Stopped at 11/01/20 0905   • HYDROmorphone pf (DILAUDID) injection 0.5-1 mg  0.5-1 mg Q2HRS PRN Meng Seymour M.D.   1 mg at 11/03/20 1315   • albuterol inhaler 2 Puff  2 Puff Q6HRS PRN Jayro Vásquez M.D.   2 Puff at 11/01/20 1901   • amiodarone (Cordarone) tablet 200 mg  200 mg QA Jayro Vásquez M.D.   200 mg at 11/04/20 0527   • ipratropium-albuterol (DUONEB) nebulizer solution  3 mL Q6HRS PRN (RT) Jayro Vásquez M.D.       • glycopyrrolate (Seebri) 15.6 mcg inhalation capsule 1 Cap  1 Cap BID (RT) Jayro Vásquez M.D.   1 Cap at 11/04/20 0731   • senna-docusate (PERICOLACE or SENOKOT S) 8.6-50 MG per tablet 2 Tab  2 Tab BID Artem Givens M.D.   2 Tab at 11/04/20 0527    And   • polyethylene glycol/lytes (MIRALAX) PACKET 1 Packet  1 Packet QDAY PRN Artem Givens M.D.   1 Packet at 11/03/20 1038    And   • magnesium hydroxide (MILK OF MAGNESIA) suspension 30 mL  30 mL QDAY PRN Artem Givens M.D.        And   • bisacodyl (DULCOLAX) suppository 10 mg  10 mg QDAY PRN Artem Givens M.D.       • Respiratory Therapy Consult   Continuous RT Artem Givens M.D.       • cloNIDine (CATAPRES) tablet 0.1 mg  0.1 mg Q6HRS PRN Artem Givens M.D.       • acetaminophen (TYLENOL) tablet 500 mg  500 mg Q6HRS PRN Artem Givens M.D.       • HYDROcodone/acetaminophen (NORCO)  MG per tablet 1 Tab  1 Tab Q6HRS PRN Artem Givens M.D.   1 Tab at 11/04/20 0934   • insulin regular (HumuLIN R,NovoLIN R) injection  1-6 Units Q6HRS Artem Givens M.D.   Stopped at 11/04/20 0532    And   • glucose 4 g  chewable tablet 16 g  16 g Q15 MIN PRN Artem Givens M.D.        And   • dextrose 50% (D50W) injection 50 mL  50 mL Q15 MIN PRN Artem Givens M.D.       Last reviewed on 10/30/2020  9:54 AM by Vinny Garcia    Medications reviewed    Imaging reviewed      Impressions:  1. Strep bacteremia, source lower extremity cellulitis  2. Acute decompensated diastolic heart failure  3. Bilateral lower extremity cellulitis  4. Severe PAD  5. Paroxysmal Afib, rate controlled  6. Coronary artery disease, history of two-vessel bypass in 2017, RCA  not revascularized    Recommendations:  - Not change in Lasix dosing today, will re-evaluate tomorrow for volume status as he is getting surgery today. Continue Lasix 40 mg IV BID  - For Afib, continue Amiodarone 200 daily. Consider restarting Outpatient metoprolol if blood pressure permits. Consider restarting anti coagulation (can start Lovenox therapeutic dose) when OK with surgery  - Antibiotics per ID       Discussed with primary physician.

## 2020-11-04 NOTE — WOUND TEAM
Wound team consulted for BLE wound care.  Patient going to surgery with Dr. Durbin today for possible amputation of left LE and surgical debridement of the right LE.  Wound team will follow along.  Patient NPO at this time in anticipation of surgical procedure.

## 2020-11-05 LAB
ANION GAP SERPL CALC-SCNC: 4 MMOL/L (ref 7–16)
ANISOCYTOSIS BLD QL SMEAR: ABNORMAL
BARCODED ABORH UBTYP: 5100
BARCODED PRD CODE UBPRD: NORMAL
BARCODED UNIT NUM UBUNT: NORMAL
BASOPHILS # BLD AUTO: 0 % (ref 0–1.8)
BASOPHILS # BLD: 0 K/UL (ref 0–0.12)
BUN SERPL-MCNC: 32 MG/DL (ref 8–22)
CALCIUM SERPL-MCNC: 8 MG/DL (ref 8.5–10.5)
CFT BLD TEG: 9.5 MIN (ref 5–10)
CHLORIDE SERPL-SCNC: 93 MMOL/L (ref 96–112)
CLOT ANGLE BLD TEG: 51.3 DEGREES (ref 53–72)
CLOT LYSIS 30M P MA LENFR BLD TEG: 0 % (ref 0–8)
CO2 SERPL-SCNC: 35 MMOL/L (ref 20–33)
COMPONENT CT 8504CT: NORMAL
CREAT SERPL-MCNC: 0.73 MG/DL (ref 0.5–1.4)
CT.EXTRINSIC BLD ROTEM: 3.2 MIN (ref 1–3)
EOSINOPHIL # BLD AUTO: 0 K/UL (ref 0–0.51)
EOSINOPHIL NFR BLD: 0 % (ref 0–6.9)
ERYTHROCYTE [DISTWIDTH] IN BLOOD BY AUTOMATED COUNT: 60.1 FL (ref 35.9–50)
GLUCOSE BLD-MCNC: 149 MG/DL (ref 65–99)
GLUCOSE BLD-MCNC: 186 MG/DL (ref 65–99)
GLUCOSE BLD-MCNC: 189 MG/DL (ref 65–99)
GLUCOSE BLD-MCNC: 192 MG/DL (ref 65–99)
GLUCOSE BLD-MCNC: 209 MG/DL (ref 65–99)
GLUCOSE SERPL-MCNC: 185 MG/DL (ref 65–99)
HCT VFR BLD AUTO: 49.6 % (ref 42–52)
HCT VFR BLD AUTO: 50.9 % (ref 42–52)
HGB BLD-MCNC: 15.9 G/DL (ref 14–18)
HGB BLD-MCNC: 15.9 G/DL (ref 14–18)
HGB BLD-MCNC: 16.1 G/DL (ref 14–18)
LYMPHOCYTES # BLD AUTO: 0.19 K/UL (ref 1–4.8)
LYMPHOCYTES NFR BLD: 0.8 % (ref 22–41)
MACROCYTES BLD QL SMEAR: ABNORMAL
MAGNESIUM SERPL-MCNC: 2 MG/DL (ref 1.5–2.5)
MANUAL DIFF BLD: NORMAL
MCF BLD TEG: 55 MM (ref 50–70)
MCH RBC QN AUTO: 29.1 PG (ref 27–33)
MCHC RBC AUTO-ENTMCNC: 32.1 G/DL (ref 33.7–35.3)
MCV RBC AUTO: 90.8 FL (ref 81.4–97.8)
METAMYELOCYTES NFR BLD MANUAL: 2.5 %
MONOCYTES # BLD AUTO: 2.12 K/UL (ref 0–0.85)
MONOCYTES NFR BLD AUTO: 9 % (ref 0–13.4)
MORPHOLOGY BLD-IMP: NORMAL
MYELOCYTES NFR BLD MANUAL: 3.3 %
NEUTROPHILS # BLD AUTO: 19.92 K/UL (ref 1.82–7.42)
NEUTROPHILS NFR BLD: 79.5 % (ref 44–72)
NEUTS BAND NFR BLD MANUAL: 4.9 % (ref 0–10)
NRBC # BLD AUTO: 0.02 K/UL
NRBC BLD-RTO: 0.1 /100 WBC
PA AA BLD-ACNC: 7 %
PA ADP BLD-ACNC: 62.1 %
PATHOLOGY CONSULT NOTE: NORMAL
PLATELET # BLD AUTO: 245 K/UL (ref 164–446)
PLATELET BLD QL SMEAR: NORMAL
PMV BLD AUTO: 9.4 FL (ref 9–12.9)
POLYCHROMASIA BLD QL SMEAR: NORMAL
POTASSIUM SERPL-SCNC: 4.4 MMOL/L (ref 3.6–5.5)
PRODUCT TYPE UPROD: NORMAL
RBC # BLD AUTO: 5.46 M/UL (ref 4.7–6.1)
RBC BLD AUTO: PRESENT
SODIUM SERPL-SCNC: 132 MMOL/L (ref 135–145)
TEG ALGORITHM TGALG: ABNORMAL
UNIT STATUS USTAT: NORMAL
WBC # BLD AUTO: 23.6 K/UL (ref 4.8–10.8)

## 2020-11-05 PROCEDURE — 99233 SBSQ HOSP IP/OBS HIGH 50: CPT | Performed by: INTERNAL MEDICINE

## 2020-11-05 PROCEDURE — 94640 AIRWAY INHALATION TREATMENT: CPT

## 2020-11-05 PROCEDURE — 85576 BLOOD PLATELET AGGREGATION: CPT

## 2020-11-05 PROCEDURE — 700101 HCHG RX REV CODE 250: Performed by: STUDENT IN AN ORGANIZED HEALTH CARE EDUCATION/TRAINING PROGRAM

## 2020-11-05 PROCEDURE — 97166 OT EVAL MOD COMPLEX 45 MIN: CPT

## 2020-11-05 PROCEDURE — P9012 CRYOPRECIPITATE EACH UNIT: HCPCS

## 2020-11-05 PROCEDURE — 83735 ASSAY OF MAGNESIUM: CPT

## 2020-11-05 PROCEDURE — 85007 BL SMEAR W/DIFF WBC COUNT: CPT

## 2020-11-05 PROCEDURE — 700102 HCHG RX REV CODE 250 W/ 637 OVERRIDE(OP): Performed by: STUDENT IN AN ORGANIZED HEALTH CARE EDUCATION/TRAINING PROGRAM

## 2020-11-05 PROCEDURE — 700111 HCHG RX REV CODE 636 W/ 250 OVERRIDE (IP): Performed by: STUDENT IN AN ORGANIZED HEALTH CARE EDUCATION/TRAINING PROGRAM

## 2020-11-05 PROCEDURE — 85018 HEMOGLOBIN: CPT

## 2020-11-05 PROCEDURE — 700111 HCHG RX REV CODE 636 W/ 250 OVERRIDE (IP): Performed by: INTERNAL MEDICINE

## 2020-11-05 PROCEDURE — A9270 NON-COVERED ITEM OR SERVICE: HCPCS | Performed by: STUDENT IN AN ORGANIZED HEALTH CARE EDUCATION/TRAINING PROGRAM

## 2020-11-05 PROCEDURE — A9270 NON-COVERED ITEM OR SERVICE: HCPCS | Performed by: INTERNAL MEDICINE

## 2020-11-05 PROCEDURE — 80048 BASIC METABOLIC PNL TOTAL CA: CPT

## 2020-11-05 PROCEDURE — 770001 HCHG ROOM/CARE - MED/SURG/GYN PRIV*

## 2020-11-05 PROCEDURE — 97161 PT EVAL LOW COMPLEX 20 MIN: CPT

## 2020-11-05 PROCEDURE — 700102 HCHG RX REV CODE 250 W/ 637 OVERRIDE(OP): Performed by: INTERNAL MEDICINE

## 2020-11-05 PROCEDURE — 82962 GLUCOSE BLOOD TEST: CPT

## 2020-11-05 PROCEDURE — 85347 COAGULATION TIME ACTIVATED: CPT

## 2020-11-05 PROCEDURE — 85384 FIBRINOGEN ACTIVITY: CPT

## 2020-11-05 PROCEDURE — 85027 COMPLETE CBC AUTOMATED: CPT

## 2020-11-05 PROCEDURE — 99232 SBSQ HOSP IP/OBS MODERATE 35: CPT | Mod: GC | Performed by: STUDENT IN AN ORGANIZED HEALTH CARE EDUCATION/TRAINING PROGRAM

## 2020-11-05 RX ORDER — DEXTROSE MONOHYDRATE 25 G/50ML
50 INJECTION, SOLUTION INTRAVENOUS
Status: DISCONTINUED | OUTPATIENT
Start: 2020-11-05 | End: 2020-11-12

## 2020-11-05 RX ORDER — FUROSEMIDE 10 MG/ML
40 INJECTION INTRAMUSCULAR; INTRAVENOUS
Status: DISCONTINUED | OUTPATIENT
Start: 2020-11-06 | End: 2020-11-06

## 2020-11-05 RX ORDER — GABAPENTIN 100 MG/1
200 CAPSULE ORAL 3 TIMES DAILY
Status: DISCONTINUED | OUTPATIENT
Start: 2020-11-06 | End: 2020-11-09

## 2020-11-05 RX ADMIN — MAGNESIUM HYDROXIDE 30 ML: 400 SUSPENSION ORAL at 05:48

## 2020-11-05 RX ADMIN — DOCUSATE SODIUM 50 MG AND SENNOSIDES 8.6 MG 2 TABLET: 8.6; 5 TABLET, FILM COATED ORAL at 05:48

## 2020-11-05 RX ADMIN — HYDROMORPHONE HYDROCHLORIDE 0.5 MG: 1 INJECTION, SOLUTION INTRAMUSCULAR; INTRAVENOUS; SUBCUTANEOUS at 09:54

## 2020-11-05 RX ADMIN — CEFAZOLIN SODIUM 2 G: 2 INJECTION, SOLUTION INTRAVENOUS at 15:05

## 2020-11-05 RX ADMIN — ROSUVASTATIN CALCIUM 40 MG: 20 TABLET, FILM COATED ORAL at 21:23

## 2020-11-05 RX ADMIN — GLYCOPYRROLATE 1 CAPSULE: 15.6 CAPSULE RESPIRATORY (INHALATION) at 10:01

## 2020-11-05 RX ADMIN — GLYCOPYRROLATE 1 CAPSULE: 15.6 CAPSULE RESPIRATORY (INHALATION) at 19:13

## 2020-11-05 RX ADMIN — HYDROCODONE BITARTRATE AND ACETAMINOPHEN 1 TABLET: 10; 325 TABLET ORAL at 10:15

## 2020-11-05 RX ADMIN — HYDROMORPHONE HYDROCHLORIDE 0.5 MG: 1 INJECTION, SOLUTION INTRAMUSCULAR; INTRAVENOUS; SUBCUTANEOUS at 21:15

## 2020-11-05 RX ADMIN — POLYETHYLENE GLYCOL 3350 1 PACKET: 17 POWDER, FOR SOLUTION ORAL at 05:49

## 2020-11-05 RX ADMIN — HYDROCODONE BITARTRATE AND ACETAMINOPHEN 1 TABLET: 10; 325 TABLET ORAL at 03:46

## 2020-11-05 RX ADMIN — CEFAZOLIN SODIUM 2 G: 2 INJECTION, SOLUTION INTRAVENOUS at 21:22

## 2020-11-05 RX ADMIN — CEFAZOLIN SODIUM 2 G: 2 INJECTION, SOLUTION INTRAVENOUS at 06:01

## 2020-11-05 RX ADMIN — LINEZOLID 600 MG: 600 TABLET, FILM COATED ORAL at 05:48

## 2020-11-05 RX ADMIN — BISACODYL 10 MG: 10 SUPPOSITORY RECTAL at 11:11

## 2020-11-05 RX ADMIN — AMIODARONE HYDROCHLORIDE 200 MG: 200 TABLET ORAL at 05:48

## 2020-11-05 RX ADMIN — LINEZOLID 600 MG: 600 TABLET, FILM COATED ORAL at 18:32

## 2020-11-05 RX ADMIN — HYDROCODONE BITARTRATE AND ACETAMINOPHEN 1 TABLET: 10; 325 TABLET ORAL at 16:33

## 2020-11-05 RX ADMIN — FUROSEMIDE 40 MG: 10 INJECTION, SOLUTION INTRAMUSCULAR; INTRAVENOUS at 05:56

## 2020-11-05 ASSESSMENT — COGNITIVE AND FUNCTIONAL STATUS - GENERAL
SUGGESTED CMS G CODE MODIFIER MOBILITY: CN
MOBILITY SCORE: 6
DRESSING REGULAR LOWER BODY CLOTHING: A LOT
HELP NEEDED FOR BATHING: A LOT
PERSONAL GROOMING: A LITTLE
TOILETING: A LOT
SUGGESTED CMS G CODE MODIFIER DAILY ACTIVITY: CK
DAILY ACTIVITIY SCORE: 14
WALKING IN HOSPITAL ROOM: TOTAL
STANDING UP FROM CHAIR USING ARMS: TOTAL
MOVING FROM LYING ON BACK TO SITTING ON SIDE OF FLAT BED: UNABLE
CLIMB 3 TO 5 STEPS WITH RAILING: TOTAL
DRESSING REGULAR UPPER BODY CLOTHING: A LOT
MOVING TO AND FROM BED TO CHAIR: UNABLE
EATING MEALS: A LITTLE
TURNING FROM BACK TO SIDE WHILE IN FLAT BAD: UNABLE

## 2020-11-05 ASSESSMENT — ENCOUNTER SYMPTOMS
COUGH: 0
NECK PAIN: 0
SHORTNESS OF BREATH: 0
VOMITING: 0
EYE PAIN: 0
HEADACHES: 0
NAUSEA: 0
POLYDIPSIA: 0
MYALGIAS: 1
BACK PAIN: 0
CHILLS: 0
PALPITATIONS: 0
DIARRHEA: 0
FOCAL WEAKNESS: 0
CONSTIPATION: 1
BRUISES/BLEEDS EASILY: 0
SPUTUM PRODUCTION: 0
NERVOUS/ANXIOUS: 0
SORE THROAT: 0
SENSORY CHANGE: 0
WEAKNESS: 1
FEVER: 0
FALLS: 0
CONSTIPATION: 0
ABDOMINAL PAIN: 0
BLURRED VISION: 0

## 2020-11-05 ASSESSMENT — GAIT ASSESSMENTS: GAIT LEVEL OF ASSIST: UNABLE TO PARTICIPATE

## 2020-11-05 ASSESSMENT — PAIN DESCRIPTION - PAIN TYPE
TYPE: ACUTE PAIN

## 2020-11-05 ASSESSMENT — FIBROSIS 4 INDEX: FIB4 SCORE: 1.96

## 2020-11-05 ASSESSMENT — ACTIVITIES OF DAILY LIVING (ADL): TOILETING: UNABLE TO DETERMINE AT THIS TIME

## 2020-11-05 NOTE — PROGRESS NOTES
[facility] ICU Progress Note      Admit Date: 10/29/2020    Resident(s): Zen Negrete M.D.   Attending:  SHIVAM KAISER/ Dr. Rafita MD    Patient ID:    Name:  Joshua Medrano   YOB: 1965  Age:  54 y.o.  male   MRN:  [id number]    Diagnosis:  Severe sepsis secondary to severe lower extremity cellulitis  Acute kidney injury     ID:  Mr. Medrano is a 54 y.o. male w/ CAD/CABG 2019, Afib, COPD, DM, and PAD who presented 10/29/2020 with LLE cellulitis, strep sepsis, and severe leg pain. Cardiology, vascular surgery, orthopedic surgery and ID are on board. Patient was transferred to the ICU on 10/31 for worsening cardiac function, dropping sodium, increasing oxygen demands and hypotension in the setting of sepsis.     Hospital Course  10/31 Transferred to ICU  10/31 CT legs bilaterally negative for abscesses or air.   11/2 O2 requirements increasing, now on 10 L, norepinephrine weaned off, signs of persisting sepsis from his cellulitis in LE, WBC 24.9, still refusing surgery, Clinda/Dapto  11/3: Lasix reduced to IV 40 mg BID, patient agreed for surgery, updated orthopedic surgery, IS; palliative consult - Abdoulaye Medrano is the POA  11/4: Orthopedics planning for surgery for his bilateral lower extremities today. Called and updated Dr. Abdoulaye Medrano @[phone], his POA  11/5: POD 1, s/p guillotine L through knee amputation. Possible transfer to medical floor later today    Consultants:  Critical Care  Orthopedics  Infectious disease  Cardiology  Vascular medicine  Palliative care     Interval Events:  -Increased wound vac blood drainage reported yesterday night. Hb stable. Received FFP and cryoppt. Stable this morning. Moderate wound drainage. Dressing clean and intact.  -pain under control with medications.   -possible transfer to floor later today    Vitals Range last 24h:  Temp:  [35.9 °C (96.6 °F)-37.4 °C (99.3 °F)] 37.3 °C (99.1 °F)  Pulse:  [66-96] 73  Resp:  [12-28] 18  BP: ()/(54-85) 92/68  SpO2:   [89 %-100 %] 99 %    Intake/Output Summary (Last 24 hours) at 11/5/2020 1155  Last data filed at 11/5/2020 0815  Gross per 24 hour   Intake 1336 ml   Output 4410 ml   Net -3074 ml        Review of Systems   Constitutional: Negative for chills and fever.   HENT: Negative for congestion, hearing loss and sore throat.    Eyes: Negative for blurred vision and pain.   Respiratory: Negative for cough and shortness of breath.    Cardiovascular: Positive for leg swelling. Negative for chest pain and palpitations.   Gastrointestinal: Positive for constipation. Negative for abdominal pain, nausea and vomiting.   Genitourinary: Negative for dysuria and frequency.   Musculoskeletal: Negative for falls and neck pain.   Skin: Negative for itching.   Neurological: Negative for sensory change and headaches.   Endo/Heme/Allergies: Negative for polydipsia. Does not bruise/bleed easily.   Psychiatric/Behavioral: Negative for suicidal ideas.   All other systems reviewed and are negative.    PHYSICAL EXAM:  Vitals:    11/05/20 0614 11/05/20 0700 11/05/20 0800 11/05/20 1000   BP: (!) 98/60  104/66 (!) 92/68   Pulse: 69  75 73   Resp: 18  20 18   Temp: 37.4 °C (99.3 °F)  37.3 °C (99.1 °F)    TempSrc:   Temporal    SpO2: 97%  100% 99%   Weight:  (!) 148.3 kg (326 lb 15.1 oz)     Height:        Body mass index is 38.77 kg/m².    O2 therapy: Pulse Oximetry: 99 %, O2 (LPM): 4, O2 Delivery Device: Nasal Cannula    Physical Exam  Constitutional:       General: He is not in acute distress.     Appearance: He is obese.   HENT:      Head: Normocephalic and atraumatic.      Nose: Nose normal.      Mouth/Throat:      Mouth: Mucous membranes are moist.   Eyes:      Extraocular Movements: Extraocular movements intact.      Conjunctiva/sclera: Conjunctivae normal.      Pupils: Pupils are equal, round, and reactive to light.   Neck:      Musculoskeletal: Normal range of motion. No muscular tenderness.      Comments: JVD +  Cardiovascular:      Rate and  Rhythm: Normal rate and regular rhythm.      Heart sounds: Normal heart sounds. No murmur.   Pulmonary:      Effort: Pulmonary effort is normal. No respiratory distress.      Comments: Decreased breath sounds in bases  Abdominal:      General: Bowel sounds are normal. There is no distension.      Palpations: Abdomen is soft. There is no mass.      Tenderness: There is no abdominal tenderness.      Hernia: A hernia (right umbilical hernia +, reducible) is present.   Musculoskeletal:         General: Swelling and tenderness present.      Right lower leg: Edema present.      Comments: LLE: s/p through knee amputation, dressing clean and intact. Wound vac in place.   Right lower extremity swelling and redness: chronic changesNo palpable crepitus. Leg wrapped in bandages   Skin:     General: Skin is warm.      Capillary Refill: Capillary refill takes 2 to 3 seconds.   Neurological:      Mental Status: He is alert and oriented to person, place, and time. Mental status is at baseline.   Psychiatric:         Behavior: Behavior normal.         Thought Content: Thought content normal.         Judgment: Judgment normal.       Date 11/05/20 0700 - 11/06/20 0659   Shift 0471-3965 4601-9525 6735-1410 24 Hour Total   INTAKE   Blood 242   242     Volume (RELEASE CRYOPRECIPITATE) 242   242   Shift Total 242   242   OUTPUT   Shift Total          242   Meds:  • insulin regular  1-6 Units      And   • glucose  16 g      And   • dextrose 50%  50 mL     • furosemide  40 mg     • MD Alert...Adult ICU Electrolyte Replacement per Pharmacy       • rosuvastatin  40 mg     • ceFAZolin  2 g Stopped (11/05/20 0631)   • linezolid  600 mg     • HYDROmorphone  0.5-1 mg     • albuterol  2 Puff     • amiodarone  200 mg     • ipratropium-albuterol  3 mL     • glycopyrrolate  1 Cap     • senna-docusate  2 Tab      And   • polyethylene glycol/lytes  1 Packet      And   • magnesium hydroxide  30 mL      And   • bisacodyl  10 mg     • Respiratory  Therapy Consult       • cloNIDine  0.1 mg     • acetaminophen  500 mg     • HYDROcodone/acetaminophen  1 Tab          Procedures:  10/29: Central line, RIJ    Labs:  Most Recent Labs:    Lab Results   Component Value Date/Time    WBC 23.6 (H) 11/05/2020 03:40 AM    RBC 5.46 11/05/2020 03:40 AM    HEMOGLOBIN 15.9 11/05/2020 10:05 AM    HEMATOCRIT 49.6 11/05/2020 03:40 AM    MCV 90.8 11/05/2020 03:40 AM    MCH 29.1 11/05/2020 03:40 AM    MCHC 32.1 (L) 11/05/2020 03:40 AM    MPV 9.4 11/05/2020 03:40 AM    NEUTSPOLYS 79.50 (H) 11/05/2020 03:40 AM    LYMPHOCYTES 0.80 (L) 11/05/2020 03:40 AM    MONOCYTES 9.00 11/05/2020 03:40 AM    EOSINOPHILS 0.00 11/05/2020 03:40 AM    EOSINOPHILS 2 11/07/2018 09:34 AM    BASOPHILS 0.00 11/05/2020 03:40 AM    HYPOCHROMIA 1+ 09/10/2019 08:09 PM    ANISOCYTOSIS 2+ (A) 11/05/2020 03:40 AM      Lab Results   Component Value Date/Time    SODIUM 132 (L) 11/05/2020 03:40 AM    POTASSIUM 4.4 11/05/2020 03:40 AM    CHLORIDE 93 (L) 11/05/2020 03:40 AM    CO2 35 (H) 11/05/2020 03:40 AM    GLUCOSE 185 (H) 11/05/2020 03:40 AM    BUN 32 (H) 11/05/2020 03:40 AM    CREATININE 0.73 11/05/2020 03:40 AM      Lab Results   Component Value Date/Time    ALTSGPT 34 10/30/2020 06:53 AM    ASTSGOT 50 (H) 10/30/2020 06:53 AM    ALKPHOSPHAT 123 (H) 10/30/2020 06:53 AM    TBILIRUBIN 1.4 10/30/2020 06:53 AM    LIPASE 546 (H) 09/12/2019 10:54 AM    ALBUMIN 2.6 (L) 10/30/2020 06:53 AM    GLOBULIN 4.2 (H) 10/30/2020 06:53 AM    PREALBUMIN 10.0 (L) 01/29/2018 03:58 AM    INR 1.54 (H) 11/04/2020 05:35 AM    MACROCYTOSIS 2+ (A) 11/05/2020 03:40 AM     Lab Results   Component Value Date/Time    PROTHROMBTM 18.9 (H) 11/04/2020 05:35 AM    INR 1.54 (H) 11/04/2020 05:35 AM      Imaging:  EC-ECHOCARDIOGRAM LTD W/ CONT   Final Result      DX-CHEST-LIMITED (1 VIEW)   Final Result      1.  Right internal jugular catheter is been placed and appears appropriately located      2.  Moderate pulmonary edema      3.  Enlarged cardiac  silhouette      CT-EXTREMITY, LOWER W/O RIGHT   Final Result      1.  No discrete fluid collection is identified to suggest abscess.      2.  Diffuse subcutaneous edema and overlying skin thickening with venous varicosities.      3.  Atherosclerosis.      4.  Diffuse muscle atrophy.      CT-EXTREMITY, LOWER W/O LEFT   Final Result      1.  Left leg cellulitis without abscess or soft tissue gas      2.  Small vessel atherosclerotic plaque      3.  osteoarthritis of the left knee and left midfoot      4.  Muscular atrophy      EC-ECHOCARDIOGRAM COMPLETE W/O CONT   Final Result      DX-CHEST-PORTABLE (1 VIEW)   Final Result         1.  Pulmonary edema and/or infiltrates are identified, which are stable since the prior exam.   2.  Cardiomegaly      US-EXTREMITY ARTERY LOWER BILAT   Final Result      US-EXTREMITY VENOUS LOWER BILAT   Final Result      DX-TIBIA AND FIBULA RIGHT   Final Result         1.  No acute traumatic bony injury.   2.  Tricompartmental degenerative changes of the knee   3.  Atherosclerosis      DX-TIBIA AND FIBULA LEFT   Final Result   Addendum 1 of 1   Addendum: Subtle clustered punctate lucencies lateral to the ankle are    seen which could represent tiny foci of soft tissue gas.      These findings were discussed with the patient's clinician, ELIZABETH HILARIO,    on 10/30/2020 12:34 AM.      Final      DX-CHEST-PORTABLE (1 VIEW)   Final Result         1.  Interstitial pulmonary parenchymal prominence, compatible with interstitial edema and/or infiltrates.   2.  Cardiomegaly        ASSESSEMENT and PLAN:  * Cellulitis  Assessment & Plan  -Severe cellulitis of bilateral lower extremities  -? Necrotizing fasciitis but CT negative  -Continue antibiotics per ID recs, currently on cefazolin and linezolid  -Vascular surgery, Orthopedics and ID are following  -Pain management - PO Norco and IV dilaudid PRN  -s/p Left through knee amputation on 11/4, POD1. Revision surgery planned for next week  -right LE:  c/c changes, debridement deferred    Sepsis (HCC)  Assessment & Plan  -Likely from infection in bilateral lower extremities  -? Necrotizing fasciitis but CT negative  -Orthopedic surgery,Vascular surgery,ID - following  -10/29: BC - GAS; repeat BC 11/1: NGTD  -10/30: WC - GAS, MSSA    PLAN:  -s/p Left through knee amputation, POD1. Revision surgery planned for next week  -anticoagulation with held given increased wound bleeding yesterday night, resume when appropriate  -continue Cefazolin and Linezolid  -Follow blood cultures  -Maintain MAP > 65  -Off pressors      High anion gap metabolic acidosis  Assessment & Plan  -RESOLVED  -Likely from sepsis and poor perfusion    Supratherapeutic INR- (present on admission)  Assessment & Plan  REsOLVED  -At admission INR 4.46, Likely from sepsis  -Warfarin held on admission  -11/3: INR 1.93  -resume AC when appropriate    Paroxysmal atrial fibrillation (HCC)- (present on admission)  Assessment & Plan  -Controlled on metoprolol, amiodarone  -[id number]: 4 points; HASBLED: 2 points.  -On warfarin with supratherapeutic INR (4.46 10/30/2020); likely from sepsis    PLAN:  -Continue amiodarone  -anticoagulation withheld given increased wound bleeding yesterday night, resume when appropriate  -resume metoprolol when appropriate  -Goal for K>4, Mg>2    LINSEY (acute kidney injury) (HCC)  Assessment & Plan  Resolved  -Likely ATN from sepsis vs cardiorenal  - Continue diuresis lasix  IV 40 mg BID per cardiology, titrate as appropriate  -Monitor UOP, I&Os  -Avoid nephrotoxins    Elevated LFTs  Assessment & Plan  -Likely from sepsis  -Monitor CMP    Polycythemia  Assessment & Plan  -Chronic  -Likely 2/2 chronic hypoxia due to COPD vs SHASHANK  -Daily labs as inpatient  -F/U with PCP for outpatient sleep studies    Elevated troponin- (present on admission)  Assessment & Plan  -with elevated BNP, no acute ischemic changes on EKG  -Demand ischemia, Type 2 MI, in the setting of heart failure and  sepsis  -11/2/20: Repeat ECHO - EF 50%, RVSP 40, mild AS    Morbid obesity with BMI of 40.0-44.9, adult ([facility])- (present on admission)  Assessment & Plan  -BMI 44.42  -Encourage mobility  -Weight loss counseling when appropriate    Heart failure with preserved ejection fraction, borderline, class IV ([facility])- (present on admission)  Assessment & Plan  -Continue diuresis, lasix changed to IV lasix 40 mg BID on 11/3/2020  -resume metoprolol when appropriate      Type 2 diabetes mellitus with complication, without long-term current use of insulin ([facility])- (present on admission)  Assessment & Plan  -HbA1C 7.8  -ISS  -Hypoglycemia protocol    COPD (chronic obstructive pulmonary disease) ([facility])  Assessment & Plan  Stable  Continue home inhalers  Supplemental O2  RT protocol    Hyponatremia  Assessment & Plan  IVC dilated on ECHO  Continue diuresis  Monitor sodium    DISPO: transfer to floor today    CODE STATUS: FULL CODE    Quality Measures:  Feeding: PO cardiac diet  Analgesia: PO Norco and IV dilaudid  Sedation: None  Thromboprophylaxis: No pharmacological - held for elevated INR and now for surgery   Head of bed: >30 degrees  Ulcer prophylaxis: None  Glycemic control: 4U regular  Bowel care: bowel regimen per protocol  Indwelling lines: [facility]  Deescalation of antibiotics: Cefazolin and Linezolid     Zen Negrete M.D.

## 2020-11-05 NOTE — WOUND TEAM
Consult received for Wound Team to perform negative pressure wound therapy dressing changes 3x a week. Surgery was yesterday so NPWT dressing change will b e done tomorrow. Consult completed. Dressing Care order placed with maintenance directions.

## 2020-11-05 NOTE — ANESTHESIA PROCEDURE NOTES
Peripheral Block    Date/Time: 11/4/2020 6:25 PM  Performed by: Riccardo Lima M.D.  Authorized by: Riccardo Lima M.D.     Start Time:  11/4/2020 6:25 PM  Reason for Block: at surgeon's request and post-op pain management    patient identified, IV checked, site marked, risks and benefits discussed, surgical consent, monitors and equipment checked, pre-op evaluation and timeout performed    Patient Position:  Supine  Prep: ChloraPrep    Monitoring:  Heart rate, continuous pulse ox and cardiac monitor  Block Region:  Lower Extremity  Lower Extremity - Block Type:  Selective FEMORAL nerve block at the Adductor Canal    Laterality:  Left  Procedures: ultrasound guided  Image captured, interpreted and electronically stored.  Local Infiltration:  Lidocaine  Strength:  1 %  Dose:  3 ml  Block Type:  Single-shot  Needle Length:  100mm  Needle Gauge:  21 G  Needle Localization:  Ultrasound guidance  Injection Assessment:  Negative aspiration for heme, no paresthesia on injection, incremental injection and local visualized surrounding nerve on ultrasound

## 2020-11-05 NOTE — PROGRESS NOTES
"                                               Cadiology Progress Note    Interval History:  11/2: Appears tired, on 10 L oxygen this morning. Denies any SOB/ chest pain. UOP 9.4L in last 24 hours.   11/03: Appears same as yesterday. On 6 L Oxygen this AM. UOP 10 L in last 24 hours. Left lower extremity appears more swelling and more erythematous, spreading proximally.   11/04: Getting surgery today. Still on 6 L oxygen. Denies any pain this AM  11/05: Had LLE BKA yesterday. Post op period was complicated with bleeding underneath his wound vac. I/O for yesterday 1.1L/5.7L with net negative of 4.6L. On 3 L Oxygen today    Physical Exam   Blood pressure (!) 98/60, pulse 69, temperature 37.4 °C (99.3 °F), resp. rate 18, height 1.956 m (6' 5\"), weight (!) 148.3 kg (326 lb 15.1 oz), SpO2 97 %.    Constitutional: Appears chronically ill, obese  HENT: Normocephalic and atraumatic. No scleral icterus.   Neck: JVD undetectable due to body habitus  Cardiovascular: Normal rate, irregular rythym. Exam reveals no gallop and no friction rub. + Systolic murmur  Pulmonary/Chest: Diminished breath sound bilaterally  Abdominal: S/NT/ND BS  Musculoskeletal: Left BKA, bandage clean. Right leg wrapped in clean bandage. Edema present.   Skin: Skin is warm and dry.           Intake/Output Summary (Last 24 hours) at 11/5/2020 1021  Last data filed at 11/5/2020 0600  Gross per 24 hour   Intake 1094 ml   Output 4410 ml   Net -3316 ml       Recent Labs     11/03/20  0445 11/04/20  0535 11/04/20  2335 11/05/20  0340 11/05/20  1005   WBC 25.5* 21.8*  --  23.6*  --    RBC 6.62* 6.66*  --  5.46  --    HEMOGLOBIN 19.2* 19.3* 16.1 15.9 15.9   HEMATOCRIT 58.9* 59.9* 50.9 49.6  --    MCV 89.0 89.9  --  90.8  --    MCH 29.0 29.0  --  29.1  --    MCHC 32.6* 32.2*  --  32.1*  --    RDW 59.6* 60.2*  --  60.1*  --    PLATELETCT 187 193  --  245  --    MPV 10.7 9.6  --  9.4  --      Recent Labs     11/03/20  0445 11/04/20  0535 11/05/20  0340   SODIUM 126* " 130* 132*   POTASSIUM 3.6 4.1 4.4   CHLORIDE 88* 91* 93*   CO2 30 31 35*   GLUCOSE 192* 144* 185*   BUN 41* 36* 32*   CREATININE 1.16 0.95 0.73   CALCIUM 8.2* 8.4* 8.0*     Recent Labs     11/03/20  0445 11/04/20  0535   INR 1.93* 1.54*                   No current facility-administered medications on file prior to encounter.      Current Outpatient Medications on File Prior to Encounter   Medication Sig Dispense Refill   • warfarin (COUMADIN) 5 MG Tab TAKE 1 & 1/2 (ONE & ONE-HALF) TABLETS BY MOUTH ONCE DAILY OR  AS  DIRECTED  BY  COUMADIN  CLINIC 45 Tab 0   • rosuvastatin (CRESTOR) 40 MG tablet Take 1 Tab by mouth every morning. 90 Tab 3   • furosemide (LASIX) 40 MG Tab Take 1 Tab by mouth every morning. 90 Tab 3   • spironolactone (ALDACTONE) 25 MG Tab Take 1 Tab by mouth every morning. 90 Tab 3   • metoprolol SR (TOPROL XL) 25 MG TABLET SR 24 HR Take 1 Tab by mouth every morning. 90 Tab 3   • amiodarone (CORDARONE) 200 MG Tab Take 1 Tab by mouth every morning. 90 Tab 3   • lisinopril (PRINIVIL) 2.5 MG Tab Take 1 Tab by mouth every morning. 90 Tab 3   • potassium chloride SA (K-DUR) 10 MEQ Tab CR Take 1 Tab by mouth every morning. 90 Each 3   • acetaminophen (TYLENOL) 325 MG Tab Take 2 Tabs by mouth every 6 hours as needed (Mild Pain; (Pain scale 1-3); Temp greater than 100.5 F). 30 Tab 0   • albuterol 108 (90 Base) MCG/ACT Aero Soln inhalation aerosol Inhale 2 Puffs by mouth every 6 hours as needed for Shortness of Breath.     • ferrous sulfate 325 (65 Fe) MG tablet Take 325 mg by mouth every morning.     • polyethylene glycol/lytes (MIRALAX) Pack Take 17 g by mouth every morning.     • tiotropium (SPIRIVA) 18 MCG Cap Inhale 18 mcg by mouth every morning.     • ipratropium-albuterol (DUONEB) 0.5-2.5 (3) MG/3ML nebulizer solution 3 mL by Nebulization route every 6 hours as needed for Shortness of Breath. 180 Bullet 10       Current Facility-Administered Medications   Medication Dose Frequency Provider Last Rate  Last Admin   • furosemide (LASIX) injection 40 mg  40 mg BID DIURETIC Adrianna Fabian M.D.   40 mg at 11/05/20 0556   • MD Alert...ICU Electrolyte Replacement per Pharmacy   PHARMACY TO DOSE Silvio Eller M.D.       • rosuvastatin (CRESTOR) tablet 40 mg  40 mg QHS Silvio Eller M.D.   40 mg at 11/04/20 2053   • ceFAZolin in dextrose (ANCEF) IVPB premix 2 g  2 g Q8HRS Carol Barber M.D.   Stopped at 11/05/20 0631   • linezolid (ZYVOX) tablet 600 mg  600 mg Q12HRS Carol Barber M.D.   600 mg at 11/05/20 0548   • HYDROmorphone pf (DILAUDID) injection 0.5-1 mg  0.5-1 mg Q2HRS PRN Meng Seymour M.D.   0.5 mg at 11/05/20 0954   • albuterol inhaler 2 Puff  2 Puff Q6HRS PRN Jayro Vásquez M.D.   2 Puff at 11/01/20 1901   • amiodarone (Cordarone) tablet 200 mg  200 mg QAM Jayro Vásquez M.D.   200 mg at 11/05/20 0548   • ipratropium-albuterol (DUONEB) nebulizer solution  3 mL Q6HRS PRN (RT) Jayro Vásquez M.D.       • glycopyrrolate (Seebri) 15.6 mcg inhalation capsule 1 Cap  1 Cap BID (RT) Jayro Vásquez M.D.   1 Cap at 11/05/20 1001   • senna-docusate (PERICOLACE or SENOKOT S) 8.6-50 MG per tablet 2 Tab  2 Tab BID Artem Givens M.D.   2 Tab at 11/05/20 0548    And   • polyethylene glycol/lytes (MIRALAX) PACKET 1 Packet  1 Packet QDAY PRN Artem Givens M.D.   1 Packet at 11/05/20 0549    And   • magnesium hydroxide (MILK OF MAGNESIA) suspension 30 mL  30 mL QDAY PRN Artem Givens M.D.   30 mL at 11/05/20 0548    And   • bisacodyl (DULCOLAX) suppository 10 mg  10 mg QDAY PRN Artem Givens M.D.       • Respiratory Therapy Consult   Continuous RT Artem Givens M.D.       • cloNIDine (CATAPRES) tablet 0.1 mg  0.1 mg Q6HRS PRN Artem Givens M.D.       • acetaminophen (TYLENOL) tablet 500 mg  500 mg Q6HRS PRN Artem Givens M.D.       • HYDROcodone/acetaminophen (NORCO)  MG per tablet 1 Tab  1 Tab Q6HRS PRN Artem Givens M.D.   1 Tab at 11/05/20 0346   • insulin regular (HumuLIN R,NovoLIN R) injection   1-6 Units Q6HRS Artem Givens M.D.   Stopped at 11/05/20 0600    And   • glucose 4 g chewable tablet 16 g  16 g Q15 MIN PRN Artem Givens M.D.        And   • dextrose 50% (D50W) injection 50 mL  50 mL Q15 MIN PRN Artem Givens M.D.       Last reviewed on 10/30/2020  9:54 AM by Marina Caruso PhT    Medications reviewed    Imaging reviewed      Impressions:  1. Strep bacteremia, source lower extremity cellulitis  2. Acute decompensated diastolic heart failure  3. Bilateral lower extremity cellulitis  4. Severe PAD  5. Paroxysmal Afib, rate controlled  6. Coronary artery disease, history of two-vessel bypass in 2017, RCA  not revascularized    Recommendations:  - Net negative 4.8 L, lost some blood during and after surgery, decreasing Lasix dose to 40 mg IV daily. Please switch to oral when clinically appears euvolumic,   - For Afib, continue Amiodarone 200 daily to keep him in sinus rythm. Re-estart Outpatient metoprolol before discharge if blood pressure permits. Please re-estart anti coagulation (can start Lovenox therapeutic dose) when OK with surgery  - Antibiotics per ID    Cardiology will sign off, please call back if there is any additional questions.       Discussed with primary physician.
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
## 2020-11-05 NOTE — ANESTHESIA TIME REPORT
Anesthesia Start and Stop Event Times     Date Time Event    11/4/2020 1716 Ready for Procedure     1729 Anesthesia Start     1844 Anesthesia Stop        Responsible Staff  11/04/20    Name Role Begin End    Riccardo Lima M.D. Anesth 1729 1844        Preop Diagnosis (Free Text):  Pre-op Diagnosis     Left Leg Cellulitis        Preop Diagnosis (Codes):    Post op Diagnosis  Cellulitis      Premium Reason  A. 3PM - 7AM    Comments: emergency,block x1usd premium#70

## 2020-11-05 NOTE — PROGRESS NOTES
"Seen and examined, doing OK today.  On board with surgery today.    /72   Pulse 96   Temp 36.2 °C (97.1 °F) (Temporal)   Resp 18   Ht 1.956 m (6' 5\")   Wt (!) 169.9 kg (374 lb 9 oz)   SpO2 97%     Exam:  Purulent drainage from skin LLE continues.  Severe blistering LLE proximal to ankle.  Purulent drainage from foot continues.  Posterior skin ulcerations proximal leg just distal to knee with purulent drainage    #1 Severe left lower extremity cellulitis  #2 Chronic wounds right lower extremity    Plan:  - To OR today for steffaniebrenidalia L through knee amputation.  Given severity of skin infection, ongoing critical illness, diminished flow to foot/vascular disease, most appropriate option to get source control is likely L through knee amputation.    - NPO  "

## 2020-11-05 NOTE — PROGRESS NOTES
Infectious Disease Progress Note    Author: Carol Barber M.D. Date & Time of service: 2020  12:34 PM    Chief Complaint:  Lower extremity cellulitis and necrotic wounds       Interval History:  54-year-old male with known coronary artery disease, atrial fibrillation on chronic anticoagulation, diabetes, who was admitted on 10/29/2020 due to worsening bilateral lower extremity pain, swelling, erythema and necrotic wounds  10/31 AF WBC 22 increased O2 but states less SOB Remains with tender and swollen BLE left greater than right-sloughing eschars   AF WBC 21.9 transferred to ICU-down to nasal cannula- BLE edema improved somewhat. Denies SE abx-Vascular note-patient declining surgery   AF, O2 10 L, increased, WBC 24.8, dressing change today by nurses, extremely tender to palpation particularly right lower leg.    11/3 AF, O2 4 L NC- improved today, WBC 25.5, has agreed to surgery and plan is for AKA on the left soon and then potential debridement or amputation of right in the future.  Patient continues to have significant pain swelling and redness in both legs particularly on the left.    Review of Systems:  Review of Systems   Constitutional: Positive for malaise/fatigue. Negative for chills and fever.   Respiratory: Negative for cough and shortness of breath.    Gastrointestinal: Negative for abdominal pain, constipation, diarrhea, nausea and vomiting.   Musculoskeletal: Positive for joint pain and myalgias.       Hemodynamics:  Temp (24hrs), Av.5 °C (97.7 °F), Min:35.9 °C (96.6 °F), Max:37.4 °C (99.3 °F)  Temperature: 37.3 °C (99.1 °F)  Pulse  Av.6  Min: 65  Max: 103   Blood Pressure: (!) 92/68, NIBP: 124/68       Physical Exam:  Physical Exam  Constitutional:       Appearance: Normal appearance. He is obese. He is not ill-appearing.   Cardiovascular:      Rate and Rhythm: Normal rate and regular rhythm.      Heart sounds: Normal heart sounds.   Pulmonary:      Effort: Pulmonary effort  is normal.      Comments: Treat breath sounds bilaterally  Abdominal:      General: Abdomen is flat. Bowel sounds are normal.      Palpations: Abdomen is soft.   Musculoskeletal:         General: Swelling, tenderness, deformity and signs of injury present.      Right lower leg: Edema present.      Left lower leg: Edema present.      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  Right lower leg with edema, erythema and tenderness, bandaging in place.   Skin:     General: Skin is warm and dry.   Neurological:      General: No focal deficit present.      Mental Status: He is alert and oriented to person, place, and time.   Psychiatric:         Mood and Affect: Mood normal.         Behavior: Behavior normal.         Meds:    Current Facility-Administered Medications:   •  insulin regular **AND** POC Blood Glucose **AND** NOTIFY MD and PharmD **AND** glucose **AND** dextrose 50%  •  furosemide  •  MD Alert...Adult ICU Electrolyte Replacement per Pharmacy  •  rosuvastatin  •  ceFAZolin  •  linezolid  •  HYDROmorphone  •  albuterol  •  amiodarone  •  ipratropium-albuterol  •  glycopyrrolate  •  senna-docusate **AND** polyethylene glycol/lytes **AND** magnesium hydroxide **AND** bisacodyl  •  Respiratory Therapy Consult  •  cloNIDine  •  acetaminophen  •  HYDROcodone/acetaminophen    Labs:  Recent Labs     11/03/20  0445 11/04/20  0535 11/04/20  2335 11/05/20  0340 11/05/20  1005   WBC 25.5* 21.8*  --  23.6*  --    RBC 6.62* 6.66*  --  5.46  --    HEMOGLOBIN 19.2* 19.3* 16.1 15.9 15.9   HEMATOCRIT 58.9* 59.9* 50.9 49.6  --    MCV 89.0 89.9  --  90.8  --    MCH 29.0 29.0  --  29.1  --    RDW 59.6* 60.2*  --  60.1*  --    PLATELETCT 187 193  --  245  --    MPV 10.7 9.6  --  9.4  --    NEUTSPOLYS 80.80* 86.60*  --  79.50*  --    LYMPHOCYTES 1.70* 3.10*  --  0.80*  --    MONOCYTES 12.50 4.70  --  9.00  --    EOSINOPHILS 1.70 1.60  --  0.00  --    BASOPHILS 0.80 0.80  --  0.00  --    RBCMORPHOLO Normal  Present  --  Present  --      Recent Labs     11/03/20  0445 11/04/20  0535 11/05/20  0340   SODIUM 126* 130* 132*   POTASSIUM 3.6 4.1 4.4   CHLORIDE 88* 91* 93*   CO2 30 31 35*   GLUCOSE 192* 144* 185*   BUN 41* 36* 32*     Recent Labs     11/03/20  0445 11/04/20  0535 11/05/20  0340   CREATININE 1.16 0.95 0.73       Imaging:  Ct-extremity, Lower W/o Right    Result Date: 10/31/2020  10/31/2020 3:13 PM HISTORY/REASON FOR EXAM:  Lower leg swelling/redness, cellulitis suspected. TECHNIQUE/EXAM DESCRIPTION AND NUMBER OF VIEWS: CT scan of the RIGHT lower extremity without contrast and including reconstructions. Thin-section noncontrast helical images were obtained. Coronal and sagittal reconstructions were generated from the axial images. Up to date radiation dose reduction adjustments have been utilized to meet ALARA standards for radiation dose reduction. COMPARISON: X-ray tibia and fibula 10/29/2020 FINDINGS: There is joint space narrowing with sclerosis and osteophyte formation in the lateral compartment of the knee. No definite bony destruction is identified. There is marked edema in the subcutaneous tissues of the right lower leg with slight thickening of the overlying skin. There are varicosities. There is arterial calcification. No well-defined fluid collection is identified to suggest abscess. There is diffuse muscle atrophy.     1.  No discrete fluid collection is identified to suggest abscess. 2.  Diffuse subcutaneous edema and overlying skin thickening with venous varicosities. 3.  Atherosclerosis. 4.  Diffuse muscle atrophy.    Ct-extremity, Lower W/o Left    Result Date: 10/31/2020  10/31/2020 2:58 PM HISTORY/REASON FOR EXAM:  Lower leg swelling/redness, cellulitis suspected; Bilateral. TECHNIQUE/EXAM DESCRIPTION AND NUMBER OF VIEWS: CT scan of the LEFT lower extremity without contrast and including reconstructions. Thin-section noncontrast helical images were obtained. Coronal and sagittal  reconstructions were generated from the axial images. Up to date radiation dose reduction adjustments have been utilized to meet ALARA standards for radiation dose reduction. COMPARISON: None. FINDINGS: Distal femur appears normal There is osteoarthritis of the left knee joint. Tibia and fibula are intact. There is no soft tissue gas. There is extensive atherosclerotic plaque There is diffuse muscular atrophy There is cutaneous and subcutaneous edema consistent with cellulitis. There are no discrete fluid collection. There is osteoarthritis of the midfoot.     1.  Left leg cellulitis without abscess or soft tissue gas 2.  Small vessel atherosclerotic plaque 3.  osteoarthritis of the left knee and left midfoot 4.  Muscular atrophy    Dx-chest-limited (1 View)    Result Date: 11/1/2020 11/1/2020 4:42 PM HISTORY/REASON FOR EXAM:  CVL placement. Central line placement TECHNIQUE/EXAM DESCRIPTION AND NUMBER OF VIEWS: Single AP view of the chest. COMPARISON:  1 view chest 10/31/2020 FINDINGS: Right internal jugular catheter has been placed. The tip projects appropriately over the superior vena cava. LUNGS: There are pulmonary interstitial and alveolar densities that are consistent with edema. HEART and MEDIASTINUM: enlarged. There has been previous sternotomy. Pleura: There are no pleural effusion or pneumothoraces. Osseous structures: No significant bony abnormality.     1.  Right internal jugular catheter is been placed and appears appropriately located 2.  Moderate pulmonary edema 3.  Enlarged cardiac silhouette    Dx-chest-portable (1 View)    Result Date: 10/31/2020    10/31/2020 6:58 AM HISTORY/REASON FOR EXAM: Pleural Effusion TECHNIQUE/EXAM DESCRIPTION:  Single AP view of the chest. COMPARISON: October 29, 2020 FINDINGS: Overlying cardiac leads are present. Cardiomegaly is observed.  Postsurgical changes of sternotomy are noted. The mediastinal contour appears within normal limits.  The central  pulmonary  vasculature appears prominent and indistinct. The lungs appear well expanded bilaterally.  Diffuse scattered hazy pulmonary parenchymal opacities are seen. No significant pleural effusions are identified. The bony structures appear age-appropriate.     1.  Pulmonary edema and/or infiltrates are identified, which are stable since the prior exam. 2.  Cardiomegaly    Dx-chest-portable (1 View)    Result Date: 10/30/2020    10/29/2020 11:32 PM HISTORY/REASON FOR EXAM: Shortness of Breath TECHNIQUE/EXAM DESCRIPTION:  Single AP view of the chest. COMPARISON: September 10, 2019 FINDINGS: Overlying cardiac leads are present. Cardiomegaly is observed.  Postsurgical changes of sternotomy are noted. The mediastinal contour appears within normal limits.  The central  pulmonary vasculature appears prominent and indistinct. The lungs appear well expanded bilaterally.  Hazy interstitial bilateral pulmonary opacities are seen. No significant pleural effusions are identified. The bony structures appear age-appropriate.     1.  Interstitial pulmonary parenchymal prominence, compatible with interstitial edema and/or infiltrates. 2.  Cardiomegaly    Dx-tibia And Fibula Left    Addendum Date: 10/30/2020    Addendum: Subtle clustered punctate lucencies lateral to the ankle are seen which could represent tiny foci of soft tissue gas. These findings were discussed with the patient's clinician, ELIZABETH HILARIO, on 10/30/2020 12:34 AM.    Result Date: 10/30/2020  10/29/2020 11:32 PM HISTORY/REASON FOR EXAM: Atraumatic Pain/Swelling/Deformity TECHNIQUE/EXAM DESCRIPTION:  AP and lateral views of the LEFT tibia and fibula. COMPARISON:  None FINDINGS: Tricompartmental degenerative changes of the knee are seen. Bony structures and articulations appear within normal limits without visualized fracture, subluxation, or dislocation. Atherosclerotic changes are seen. Soft tissue phleboliths are seen. Soft tissue edema is noted.     1.  No acute  traumatic bony injury. 2.  Tricompartmental degenerative changes of the knee. 3.  Atherosclerosis    Dx-tibia And Fibula Right    Result Date: 10/30/2020  10/29/2020 11:32 PM HISTORY/REASON FOR EXAM: Atraumatic Pain/Swelling/Deformity TECHNIQUE/EXAM DESCRIPTION:  AP and lateral views of the RIGHT tibia and fibula. COMPARISON:  None FINDINGS: Tricompartmental degenerative changes of the knee are seen, otherwise the bony structures and articulations appear within normal limits without visualized fracture, subluxation, or dislocation. Atherosclerotic changes are seen. Scattered soft tissue level associated.     1.  No acute traumatic bony injury. 2.  Tricompartmental degenerative changes of the knee 3.  Atherosclerosis    Us-extremity Artery Lower Bilat    Result Date: 10/30/2020  Lower Extremity  Arterial Duplex Report  Vascular Laboratory  CONCLUSIONS  1) Bilateral atherosclerosis  2) Right distal SFA 50-75% stenosis.  2) Monophasic waveforms in the left lower extremity  JARRETT WHITE  Exam Date:     10/30/2020 01:09  Room #:     Inpatient  Priority:     Stat  Ht (in):             Wt (lb):  Ordering Physician:        ELIZABETH HILARIO  Referring Physician:       ELIZABETH HILARIO  Sonographer:               Silvia Lassiter RVT,                             [facility]  Study Type:                Complete Bilateral  Technical Quality:         Adequate  Age:    54    Gender:     M  MRN:    [id number]  :    1965      BSA:  Indications:     Ulceration of LE  CPT Codes:       74734  ICD Codes:       707.1  History:         Nonhealing, raw and bleeding wounds bilaterally. Blue                   discoloration of limbs. Severe pain bilaterally.  Limitations:     Pain.                RIGHT  Waveform        Peak Systolic Velocity (cm/s)                  Prox    Prox-Mid  Mid    Mid-Dist  Distal  Triphasic                         107                      CFA  Triphasic       110                                        PFA  Triphasic        96                89      214      79      SFA  Biphasic                          44                       POP  Biphasic                                           49      AT  Not                                                        PT  attained  Not                                                        BRITTON  attained                LEFT  Waveform        Peak Systolic Velocity (cm/s)                  Prox    Prox-Mid  Mid    Mid-Dist  Distal  Monophasic                        137              109     CFA  Biphasic        67                                         PFA  Monophasic      98                125                      SFA                                                             POP                                                             AT                                                             PT                                                             BRITTON  FINDINGS  Right.  There is atherosclerotic plaque seen throughout the limb.  Stenosis of the distal femoral artery. Velocities are consistent with 50-  75% stenosis.  Waveforms at the common femoral, profunda femoral and femoral artery are  triphasic.  Waveforms at the popliteal and distal anterior tibial artery are biphasic.  The remaining vessels were not properly visualized evaluated due to edema,  severe pain in non-healing bleeding wounds and large body habitus.  Left.  There is atherosclerotic plaque seen throughout the limb.  Waveforms at the common femoral, profunda femoral and femoral artery are  monophasic.  The remaining vessels of the left lower limb were not properly  visualized/evaluated due to edema, severe pain in area of non-healing  bleeding wounds and large body habitus.  Nima Dill MD  (Electronically Signed)  Final Date:      30 October 2020                   03:22    Us-extremity Venous Lower Bilat    Result Date: 10/30/2020   Vascular Laboratory  CONCLUSIONS  1) Normal bilateral superficial and deep venous  examination of the lower  extremities.  2) Lower extremity edema, limits diagnostic sensitivity of this exam  JARRETT WHITE  Exam Date:     10/30/2020 00:46  Room #:     Inpatient  Priority:     Stat  Ht (in):             Wt (lb):  Ordering Physician:        ELIZABETH HILARIO  Referring Physician:       [id number] New  Sonographer:               Silvia Lassiter RVT, RDMS  Study Type:                Complete Bilateral  Technical Quality:         Adequate  Age:    54    Gender:     M  MRN:    [id number]  :    1965      BSA:  Indications:     Localized swelling, mass and lump, lower limb, bilateral,                   Edema, unspecified, Ulceration of LE  CPT Codes:       08974  ICD Codes:       R22.43  R60.9  707.1  History:         Swelling of bilateral lower limbs. Edema. Nonhealing, raw and                   bleeding wounds bilaterally.  Limitations:     Edema, large body habitus and severe pain in limbs at touch  PROCEDURES:  Bilateral lower extremity venous duplex imaging.  The following venous structures were evaluated: common femoral, profunda  femoral, proximal greater saphenous, femoral, popliteal , peroneal and  posterior tibial veins.  Serial compression, augmentation maneuvers,  color and spectral Doppler  flow evaluations were performed.  FINDINGS:  Bilateral lower extremities  No superficial or deep venous thrombosis.  Complete color filling and compressibility with normal venous flow dynamics  including spontaneous flow, response to augmentation maneuvers, and  respiratory phasicity.  The peroneal and posterior tibial veins are difficult to assess for  compressibility and flow by color flow due to severe pain in limbs through  the nonhealing bleeding wounds and edema.  Interstitial fluid consistent with edema is observed in the thigh and below  the knee.  Edema reduces the image quality.  Nima Dill MD  (Electronically Signed)  Final Date:      2020                    03:19    Ec-echocardiogram Complete W/o Cont    Result Date: 10/31/2020  Transthoracic Echo Report Echocardiography Laboratory CONCLUSIONS Poor quality echo, consider repeating with contrast Probably normal LVEF Mild aortic stenosis. RVSP estimated at 30-35 mmHg. JARRETT WHITE Exam Date:         10/31/2020                    09:44 Exam Location:     Inpatient Priority:          Routine Ordering Physician:        MORIAH HUI Referring Physician: Sonographer:               Stalin Mtz RDCS Age:    54     Gender:    M MRN:    [id number] :    1965 BSA:    2.58   Ht (in):    77     Wt (lb):    280 Exam Type:     Complete Indications:     Heart Failure, Systolic ICD Codes:       428.2 CPT Codes:       54189 BP:   119    /   67     HR:   73 Technical Quality:       Technically difficult study                          incomplete information is                          obtained MEASUREMENTS  (Male / Female) Normal Values 2D ECHO LVOT Diameter                     2 cm                  DOPPLER AV Peak Velocity                  2.6 m/s               AV Peak Gradient                  27.9 mmHg             AV Mean Gradient                  17.7 mmHg             LVOT Peak Velocity                1.4 m/s               AV Area Cont Eq vti               1.6 cm2               MV Velocity Time Integral         41 cm                 Mitral E Point Velocity           1 m/s                 Mitral E to A Ratio               1.1                   Mitral A Duration                 96.9 ms               MV Pressure Half Time             102 ms                MV Area PHT                       2.2 cm2               MV Deceleration Time              352 ms                TR Peak Velocity                  258 cm/s              PV Peak Velocity                  1.3 m/s               PV Peak Gradient                  6.5 mmHg              PV Mean Gradient                  3.9 mmHg              * Indicates values subject to  auto-interpretation LV EF:        % FINDINGS Left Ventricle Normal left ventricular chamber size. Normal left ventricular wall thickness. Unable to determine diastolic function. Reliable ejection fraction estimate cannot be made due to technical limitation. Right Ventricle Right ventricle not well visualized. Right Atrium Dilated inferior vena cava with inspiratory collapse. Left Atrium Left atrium not well visualized. Mitral Valve Mitral annular calcification. No stenosis or regurgitation seen. Aortic Valve The aortic valve is not well visualized. Mild aortic stenosis. Vmax is 2.5  m/s. Transvalvular gradients are - Peak: 26 mmHg, Mean: 18 mmHg. No aortic insufficiency. Tricuspid Valve Structurally normal tricuspid valve. No stenosis or regurgitation seen. RVSP estimated at 30-35 mmHg Pulmonic Valve Structurally normal pulmonic valve. No pulmonic stenosis. Mild pulmonic insufficiency. Pericardium Normal pericardium without effusion. Aorta Normal aortic root for body surface area. Ascending aorta diameter is 3.2 cm. Quinton Bueno MD (Electronically Signed) Final Date:     2020                 12:02    Ec-echocardiogram Ltd W/ Cont    Result Date: 11/3/2020  Transthoracic Echo Report Echocardiography Laboratory CONCLUSIONS Compared to the images of the prior study done on 10/31/2020, no significant changes are noted. Left ventricular ejection fraction is visually estimated to be 50%. Mild aortic stenosis. Estimated right ventricular systolic pressure  is 40 mmHg. JARRETT WHITE Exam Date:         2020                    09:41 Exam Location:     Inpatient Priority:          Routine Ordering Physician:        QUINTON BUENO                             ([id number]) Referring Physician:       XIMENA De Jesus Sonographer:               Analia Swan [facility] Age:    54     Gender:    M MRN:    [id number] :    1965 BSA:    2.84   Ht (in):    77     Wt (lb):    350 Exam Type:     Limited, Contrast Indications:      Congestive Heart Failure ICD Codes:       428 CPT Codes:       66280, , 51520, 36926 BP:   113    /   74     HR: Technical Quality:       Fair MEASUREMENTS  (Male / Female) Normal Values 2D ECHO Estimated LV Ejection Fraction    50 %                  IVC Diameter                      2.2 cm                DOPPLER AV Peak Velocity                  2.7 m/s               AV Peak Gradient                  30.1 mmHg             AV Mean Gradient                  18.1 mmHg             LVOT Peak Velocity                1.3 m/s               MV Velocity Time Integral         35.8 cm               MV Pressure Half Time             80 ms                 MV Area PHT                       2.8 cm2               TR Peak Velocity                  299 cm/s              * Indicates values subject to auto-interpretation LV EF:  50    % FINDINGS Left Ventricle 3 mL of contrast was administered. Existing IV was used. Contrast was used to enhance visualization of the endocardial border. Left ventricular systolic function is low normal. Left ventricular ejection fraction is visually estimated to be 50%. Right Ventricle Right Atrium Left Atrium Mitral Valve Mild mitral stenosis. Mean transvalvular gradient is 3  mmHg at a heart rate of 78  BPM. Aortic Valve Mild aortic stenosis. Vmax is  2.6 m/s. Transvalvular gradients are - Peak: 29 mmHg, Mean: 17 mmHg. Tricuspid Valve Mild tricuspid regurgitation. Right atrial pressure is estimated to be 3 mmHg. Estimated right ventricular systolic pressure  is 40 mmHg. Pulmonic Valve Pericardium Normal pericardium without effusion. Aorta Jerry Reyes MD (Electronically Signed) Final Date:     03 November 2020                 12:41      Micro:  Results     Procedure Component Value Units Date/Time    BLOOD CULTURE x2 [[id number]] Collected: 10/29/20 2336    Order Status: Completed Specimen: Blood from Peripheral Updated: 11/04/20 0300     Significant Indicator NEG     Source BLD     Site  "PERIPHERAL     Culture Result No growth after 5 days of incubation.    Narrative:      Per Hospital Policy: Only change Specimen Src: to \"Line\" if  specified by physician order.  Right Forearm/Arm    Blood Culture [[id number]] Collected: 11/01/20 1302    Order Status: Completed Specimen: Blood Updated: 11/03/20 0743     Significant Indicator NEG     Source BLD     Site Peripheral     Culture Result No Growth  Note: Blood cultures are incubated for 5 days and  are monitored continuously.Positive blood cultures  are called to the RN and reported as soon as  they are identified.      Narrative:      Left AC    Blood Culture [[id number]] Collected: 11/01/20 1302    Order Status: Completed Specimen: Blood Updated: 11/03/20 0743     Significant Indicator NEG     Source BLD     Site Peripheral     Culture Result No Growth  Note: Blood cultures are incubated for 5 days and  are monitored continuously.Positive blood cultures  are called to the RN and reported as soon as  they are identified.      BLOOD CULTURE [[id number]] Collected: 11/01/20 1400    Order Status: Sent Specimen: Blood from Peripheral     BLOOD CULTURE [[id number]] Collected: 11/01/20 1400    Order Status: Sent Specimen: Blood from Peripheral     E-Test [[id number]] Collected: 10/29/20 2338    Order Status: Completed Specimen: Other Updated: 11/01/20 0839     ETEST Sensitivity FINAL    Narrative:      171 tel. [phone] 10/31/2020, 11:38, RB PERF. RESULTS CALLED TO:[id number]  Per Hospital Policy: Only change Specimen Src: to \"Line\" if  specified by physician order.    BLOOD CULTURE x2 [[id number]]  (Abnormal)  (Susceptibility) Collected: 10/29/20 2338    Order Status: Completed Specimen: Blood from Peripheral Updated: 11/01/20 0839     Significant Indicator POS     Source BLD     Site PERIPHERAL     Culture Result Growth detected by Bactec instrument. 10/30/2020  12:52      Beta Hemolytic Streptococcus group A  This isolate is presumed to be clindamycin resistant " "based on  detection of inducible resistance.  Clindamycin may still  be effective in some patients.      Narrative:      CALL  [place]  171 tel. [phone],  CALLED  171 tel. [phone] 10/31/2020, 11:38, RB PERF. RESULTS CALLED  TO:[id number]  Per Hospital Policy: Only change Specimen Src: to \"Line\" if  specified by physician order.  Right AC    Susceptibility     Beta hemolytic streptococcus group a (1)     Antibiotic Interpretation Microscan Method Status    Penicillin Sensitive 0.032 mcg/mL E-TEST Final    Cefotaxime Sensitive 0.023 mcg/mL E-TEST Final                   CULTURE WOUND W/ GRAM STAIN [[id number]]  (Abnormal)  (Susceptibility) Collected: 10/30/20 0103    Order Status: Completed Specimen: Wound from Abscess Updated: 11/01/20 0756     Significant Indicator POS     Source WND     Site Bilateral Lower Extremity     Culture Result Light growth mixed skin eloy.     Gram Stain Result Moderate Gram positive cocci.  Rare small Gram positive rods.       Culture Result Staphylococcus aureus  Heavy growth        Beta Hemolytic Streptococcus group A  Heavy growth      Narrative:      CALL  [place]  171 tel. [phone],  CALLED  171 tel. [phone] 10/31/2020, 11:44, RB PERF. RESULTS CALLED  TO:[id number] RN    Susceptibility     Staphylococcus aureus (1)     Antibiotic Interpretation Microscan Method Status    Azithromycin Resistant >4 mcg/mL JELANI Final    Clindamycin Sensitive <=0.5 mcg/mL JELANI Final    Cefazolin Sensitive <=8 mcg/mL JELANI Final    Ceftaroline Sensitive <=0.5 mcg/mL JELANI Final    Daptomycin Sensitive <=1 mcg/mL JELANI Final    Ampicillin/sulbactam Sensitive <=8/4 mcg/mL JELANI Final    Erythromycin Resistant >4 mcg/mL JELANI Final    Vancomycin Sensitive 1 mcg/mL JELANI Final    Oxacillin Sensitive 0.5 mcg/mL JELANI Final    Penicillin Resistant >8 mcg/mL JELANI Final    Pip/Tazobactam Sensitive <=4 mcg/mL JELANI Final    Trimeth/Sulfa Sensitive <=0.5/9.5 mcg/mL JELANI Final    Tetracycline Sensitive <=4 mcg/mL JELANI Final                 "    GRAM STAIN [[id number]] Collected: 10/30/20 0103    Order Status: Completed Specimen: Wound Updated: 10/30/20 2155     Significant Indicator .     Source WND     Site Bilateral Lower Extremity     Gram Stain Result Moderate Gram positive cocci.  Rare small Gram positive rods.      Urinalysis [[id number]]  (Abnormal) Collected: 10/30/20 0634    Order Status: Completed Specimen: Urine, Cath Updated: 10/30/20 0733     Color DK Yellow     Character Turbid     Specific Gravity 1.027     Ph 5.0     Glucose Negative mg/dL      Ketones Negative mg/dL      Protein 100 mg/dL      Bilirubin Moderate     Urobilinogen, Urine 1.0     Nitrite Negative     Leukocyte Esterase Trace     Occult Blood Moderate     Micro Urine Req Microscopic    Narrative:      If not done within the last 24 hours    URINALYSIS [[id number]]     Order Status: Canceled Specimen: Urine, Cath     SARS-CoV-2, PCR (In-House) [[id number]] Collected: 10/30/20 0126    Order Status: Completed Updated: 10/30/20 0242     SARS-CoV-2 Source NP Swab     SARS-CoV-2 by PCR NotDetected     Comment: [facility] providers: PLEASE REFER TO DE-ESCALATION AND RETESTING PROTOCOL  on [email or web address]  **The Nor1 GeneXpert Xpress SARS-CoV-2 Test has been made available for  use under the Emergency Use Authorization (EUA) only.         Narrative:      Is patient being admitted?->Yes  Does this patient meet criteria for Rush/Cepheid per [facility]  Inpatient Workflow? (See workflow link below)->Yes  Expected turn around time?->Rush (Cepheid 2-4 hours)  Is this the patients First SARS CoV-2 test?->Unknown  Is this patient employed in healthcare?->No  Is the patient symptomatic as defined by the CDC?->Unknown  Is the patient hospitalized?->Yes  Is the patient in the ICU?->Unknown  Is the patient a resident in a congregate care setting?->No  Is the patient pregnant?->No    COVID/SARS CoV-2 PCR [[id number]] Collected: 10/30/20 0126    Order Status: Completed Specimen: Respirate from  Nasopharyngeal Updated: 10/30/20 0142     COVID Order Status Received     Comment: The order for SARS CoV-2 testing has been received by the  Laboratory. This result is neither positive nor negative.  Final results of testing will report in 24-48 hours, separately.         Narrative:      Is patient being admitted?->Yes  Does this patient meet criteria for Rush/Cepheid per [facility]  Inpatient Workflow? (See workflow link below)->Yes  Expected turn around time?->Rush (Cepheid 2-4 hours)  Is this the patients First SARS CoV-2 test?->Unknown  Is this patient employed in healthcare?->No  Is the patient symptomatic as defined by the CDC?->Unknown  Is the patient hospitalized?->Yes  Is the patient in the ICU?->Unknown  Is the patient a resident in a congregate care setting?->No  Is the patient pregnant?->No    Blood Culture [[id number]] Collected: 10/30/20 0000    Order Status: Canceled Specimen: Other from Peripheral     Blood Culture [[id number]] Collected: 10/30/20 0000    Order Status: Canceled Specimen: Other from Peripheral           Assessment:  Active Hospital Problems    Diagnosis   • *Cellulitis [L03.90]   • Right-sided heart failure ([facility]) [I50.810]   • Sepsis ([facility]) [A41.9]   • High anion gap metabolic acidosis [E87.2]   • Supratherapeutic INR [R79.1]   • Paroxysmal atrial fibrillation ([facility]) [I48.0]   • LINSEY (acute kidney injury) ([facility]) [N17.9]   • Polycythemia [D75.1]   • Elevated troponin [R77.8]   • Morbid obesity with BMI of 40.0-44.9, adult ([facility]) [E66.01, Z68.41]   • Heart failure with preserved ejection fraction, borderline, class IV ([facility]) [I50.30]   • Type 2 diabetes mellitus with complication, without long-term current use of insulin ([facility]) [E11.8]   • COPD (chronic obstructive pulmonary disease) ([facility]) [J44.9]   • Hyponatremia [E87.1]   • Elevated LFTs [R79.89]     Interval 24 hours:      AF, O2 4 L NC   Labs reviewed, WBC 23.6   Micro reviewed    Patient is status post amputation of the left leg.  He is  reporting some ongoing pain at the site and continued pain and swelling of his right leg.  Patient continued on cefazolin 2 g every 8 and linezolid.    Assessment:  Hypoxia, improved  - chest x-ray with edema, no obvious pneumonia  Bacteremia, GAS  -Cultures from 10/29 and 10/30 with group A strep, clindamycin resistant, penicillin sensitive, 0.032, cefotaxime sensitive 0.023  -Cultures on 11/1 with no growth to date    Bilateral lower extremity necrotizing cellulitis and concern for development of necrotizing fasciitis-pain out of proportion on right and appears to be worsening area of edema erythema and pain on the left  -Wound cultures with GAS and MSSA, eliana stain with GPCs and rare GPRs  - Plan is for left side AKA -he has been refusing intervention but has now agreed for surgery  -CT lower extremity right without contrast on 10/31 without abscess or soft tissue gas.  CT lower extremity left with no discrete fluid collection to suggest abscess or noted air in the tissues.  -The patient went to the OR with Dr. Durbin on 11/4 for left guillotine through knee amputation-no cultures obtained  Leukocytosis, ongoing, multifactorial, infection and surgery   Acute kidney injury-improved again today   Demand ischemia with elevation of his troponins and proBNP  Penicillin allergic, with anaphylaxis and rash, however this was as a child so unknown if still present - tolerated cefepime, ceftriaxone and cefazolin per notes   Diabetes     Plan:  ---  Continue cefzolin for the group A strep bacteremia and will also cover the MSSA cellulitis.  Will need at least a 2-week antibiotic course from the first negative blood culture. Due to concern for toxin production and potential necrotizing fasciitis initiated linezolid to give additional therapy and thought to be better choice given reported clindamycin resistance of the group A strep-will will continue linezolid for now but may be able to stop soon  --- Monitor and control blood  sugars       ID will follow.
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
## 2020-11-05 NOTE — ANESTHESIA PREPROCEDURE EVALUATION
Relevant Problems   ANESTHESIA   (+) SHASHANK (obstructive sleep apnea)      PULMONARY   (+) COPD (chronic obstructive pulmonary disease) ([facility])   (+) Pneumonia due to infectious organism   (+) Shortness of breath      NEURO   (+) History of stroke      CARDIAC   (+) Acute exacerbation of CHF (congestive heart failure) ([facility])   (+) CAD (coronary artery disease)   (+) Essential hypertension   (+) NSTEMI (non-ST elevated myocardial infarction) ([facility])   (+) Paroxysmal atrial fibrillation ([facility])         (+) LINSEY (acute kidney injury) ([facility])   (+) CKD (chronic kidney disease), stage III      ENDO   (+) Type 2 diabetes mellitus with complication, without long-term current use of insulin ([facility])       Physical Exam    Airway   Mallampati: II  TM distance: >3 FB  Neck ROM: full       Cardiovascular - normal exam  Rhythm: regular  Rate: normal  (-) murmur     Dental - normal exam           Pulmonary - normal exam  Breath sounds clear to auscultation     Abdominal    Neurological - normal exam                 Anesthesia Plan    ASA 4- EMERGENT       Plan - general       Airway plan will be LMA        Induction: intravenous    Postoperative Plan: Postoperative administration of opioids is intended.    Pertinent diagnostic labs and testing reviewed    Informed Consent:    Anesthetic plan and risks discussed with patient.    Use of blood products discussed with: patient whom consented to blood products.

## 2020-11-05 NOTE — DISCHARGE PLANNING
Care Transition Team Discharge Planning    Anticipated Discharge Disposition: TBD    Action: Per AM rounds, pt had BKA left side last night. His bleeding has stopped. May be able to transfer lower level of care after monitor hemoglobin today.    Barriers to Discharge: TBD    Plan: f/u w/ medical team, etc.

## 2020-11-05 NOTE — ASSESSMENT & PLAN NOTE
Behavioral modification weight loss encouraged long-term  Much of the weight is water weight, ongoing diuresis  SHASHANK evaluation as an outpatient, okay to consult pulmonary service prior to discharge to facilitate  Most recent TSH normal

## 2020-11-05 NOTE — ANESTHESIA PROCEDURE NOTES
Airway    Date/Time: 11/4/2020 5:40 PM  Performed by: Riccardo Lima M.D.  Authorized by: Riccardo Lima M.D.     Location:  OR  Urgency:  Elective  Indications for Airway Management:  Anesthesia      Spontaneous Ventilation: absent    Sedation Level:  Deep  Preoxygenated: Yes    Final Airway Type:  Supraglottic airway  Final Supraglottic Airway:  Standard LMA    SGA Size:  4  Number of Attempts at Approach:  1

## 2020-11-05 NOTE — OP REPORT
DATE OF SERVICE:  11/04/2020    PREOPERATIVE DIAGNOSES:  1.  Left lower extremity severe cellulitis and sepsis.  2.  Dysvascular bilateral lower extremity.    POSTOPERATIVE DIAGNOSES:  1.  Left lower extremity severe cellulitis and sepsis.  2.  Dysvascular bilateral lower extremity.    PROCEDURE:  Left guillotine through-knee amputation.    SURGEON:  Johnathan Durbin MD    ASSISTANT:  None.    ANESTHESIOLOGIST:  Dr. Lima.    ANESTHESIA TYPE:  Regional and general.    SPECIMENS:  Left lower leg.    ESTIMATED BLOOD LOSS:  300 mL.    COMPLICATIONS:  None.    DRAINS:  Wound VAC to suction.    OPERATIVE INDICATIONS:  The patient is a 54-year-old male with severe left   lower extremity cellulitis and sepsis.  This was initially concern for   necrotizing fasciitis.  However, there was a relatively slow spread, no   concern for axial spread along the fascial planes.  He has had worsening of   his erythema over the past few days and relatively stable today, perhaps   slight improvement.  He has purulent drainage from multiple small skin   ulceration of the left lower extremity.  He has skin ulcerations on the   posterior aspect of the leg just distal to the knee, blistering and purulence   distal lateral along the ankle as well as on the dorsum of the foot and medial   aspect of the leg.  Broad spectrum antibiotics have not markedly improved his   picture.  He remains critically ill.  He has now consented to surgery.  Given   these findings, he is an appropriately indicated candidate for left   through-knee amputation.  I discussed the risks, benefits, and alternatives   including risk of persistent infection, wound healing complication, need for   additional surgery, phantom limb sensation, phantom pain, difficulty   ambulating with an above-knee amputation, which was the initial plan and the   medical risks of anesthesia.  We discussed benefits including source control   for his infection and alternatives including  nonoperative management.    Informed consent was signed and documented.  I met with him preoperatively and   marked the operative extremity.    I also had a long discussion with him that I did not feel debridement was a   reasonable option as he has poor blood flow to the left foot.  I did not think   that more distal amputation was a reasonable option.  Given his gross skin   changes, I do not think continued medical management or medical management   alone was a reasonable option given lack of source control on his overall   illness.  Indicated understanding and agreed with the plan to proceed.    OPERATIVE COURSE:  He underwent general and regional anesthesia at my request   for postoperative pain control.  He was positioned supine.  All bony   prominences were well padded.  The left lower extremity was prepped and draped   in sterile orthopedic fashion using ChloraPrep and the surgical team scrubbed   in.  A procedural pause was conducted to verify correct patient, correct   extremity, presence of the surgeon's initials on the operative extremity, in   this case he has been maintained on antibiotics on the floor on schedule,   additional Ancef was administered preincision.  Following generalized   agreement, we fouzia out amputation flaps for definitive above-knee amputation   and then made a circumferential incision just at the tip of this flap.  We   dissected down to the knee and transected the patellar tendon.  We dissected   down through the anterior and posterior cruciate ligaments as well as the   collateral ligaments.  The amputation was then completed with the amputation   knife.  The popliteal artery was tied with multiple 0 silk ties.  The   popliteal vein was also tied with multiple 0 silk ties.  Other vessels were   cauterized.  Hemostasis was obtained.  The tourniquet was deflated.  Total   tourniquet time of less than 20 minutes.  When that was complete, we irrigated   the wound and applied a wound  VAC.  He tolerated the procedure well,   sustaining no complications.    His right lower extremity dressings were changed.  The right lower extremity   was not debrided.  These wounds appeared to be fairly superficial.  There did   not appear to be much surrounding cellulitis and has appeared chronic in   nature, transferred to the recovery room in critical but unchanged condition.    POSTOPERATIVE PLAN:  1.  Wound VAC changes 3 times weekly while awaiting definitive closure.  2.  Deep venous thrombosis prophylaxis with sequential compression devices,   chemical prophylaxis per medicine given his elevated INR.  3.  Antibiotics per medicine.  4.  Return to the operating room next week for definitive amputation if   cellulitis changes improved sufficiently.       ____________________________________     MD GAGE Carey / YOSVANY    DD:  11/04/2020 18:43:20  DT:  11/04/2020 20:24:47    D#:  [id number]  Job#:  [id number]

## 2020-11-05 NOTE — ANESTHESIA POSTPROCEDURE EVALUATION
Patient: Joshua Medrano    Procedure Summary     Date: 11/04/20 Room / Location: [place] [address] / SURGERY [facility]    Anesthesia Start: 1729 Anesthesia Stop: 1844    Procedure: AMPUTATION, BELOW KNEE-GUILLOTINE (Left Leg Lower) Diagnosis: (Left Leg Cellulitis)    Surgeons: Johnathan Durbin M.D. Responsible Provider: Riccardo Lima M.D.    Anesthesia Type: general ASA Status: 4 - Emergent          Final Anesthesia Type: general  Last vitals  BP   Blood Pressure: 113/75, NIBP: 124/68    Temp   36.1 °C (97 °F)    Pulse   Pulse: 81   Resp   20    SpO2   94 %      Anesthesia Post Evaluation    Patient location during evaluation: PACU  Patient participation: complete - patient participated  Level of consciousness: awake and alert  Pain score: 2    Airway patency: patent  Anesthetic complications: no  Cardiovascular status: hemodynamically stable  Respiratory status: acceptable  Hydration status: euvolemic    PONV: none           Nurse Pain Score: 0 (NPRS)

## 2020-11-05 NOTE — PROGRESS NOTES
0000  Dr. Evans at bedside to assess wound vac and drainage. Wound vac dressing reinforced, ACE bandage placed with abdominal pads underneath by Dr. Evans at bedside. Dr. Evans assessed wound vac, working appropriately and to remain at 75 mmHG as instructed previously by TRENTON Preston.

## 2020-11-05 NOTE — PROGRESS NOTES
ICU transfer note     Attending: Dr Rafita MD  Resident: Dr. Zen Negrete M.D.  Date of admission: 10/29/2020  Date of transferring out from ICU:  11/05/20    Primary diagnosis:   Sepsis secondary to lower extremity cellulitis    Secondary diagnoses:  Acute kidney injury     HPI and ICU Course Summary (Brief Narrative):  Joshua Medrano is 54 y.o. with a past medical history of CAD/CABG 2019, Afib, COPD, DM, and PAD who presented 10/29/2020 with LLE cellulitis, strep sepsis, and severe leg pain. Wound c/s shows growth of MSSA and gp A streptococcus. Blood culture positive for gp A streptococcus.  CT scan left lower extremity showed diffuse edema without fluid collection or gas. Patient was transferred to the ICU on 10/31 for worsening cardiac function, dropping sodium, increasing oxygen demands and hypotension in the setting of sepsis. Patient received pressor support for persistent hypotension. Antibiotics continued per ID recommendation, currently on cefazolin and linezolid. Ortho surgery consulted for surgical intervention for source control. He underwent guillotine Left through knee amputation on 11/4/20. Post operative period uneventful. Patient was followed by cardiology for fluid balance given persistent hypotension with decompensated heart failure. Currently on IV lasix 40 daily. Patient clinically improved with above mentioned interventions. Patient hemodynamically stable and hence will be transferred to floors for further management.      Important Events in the ICU:   - Central Line: 11/1/20  - Intubation: N/A  - Ceballos catheter: 10/31/20  - Antibiotics: Cefazolin, continue at least 2 weeks from negative blood culture . Linezolid, continue for now, follow with ID for duration    Labs and imaging studies to be continued with their indications:  - CBC  - BMP    Things to follow:   -Anticoagulation currently withheld due to increased left knee wound  bleeding, resume when appropriate. Confirm with Ortho regarding anticoagulation  -Resume metoprolol when appropriate  -titrate lasix as appropriate  -need outpatient sleep apnea evaluation  -f/w ID for antibiotic management    Zen Negrete M.D.

## 2020-11-05 NOTE — OR NURSING
Left leg stump oozing with bloody drainage,wound vac. remains patent at 125mmHg. Suction and with 350 ml.drainage.Dr. Durbin made aware-no new orders.ABD and gauze dressings applied with kerlix as re-inforcement.Pt. remains AAOX4.Pt. was medicated for moderate left leg pain with fair relief.

## 2020-11-05 NOTE — CARE PLAN
Problem: Pain Management  Goal: Pain level will decrease to patient's comfort goal  Outcome: PROGRESSING AS EXPECTED     Non pharmacologic and pharmacologic interventions in place to promote comfort and decrease pain. Pt able to verbalize discomfort and needs.    Problem: Skin Integrity  Goal: Risk for impaired skin integrity will decrease  Outcome: PROGRESSING SLOWER THAN EXPECTED    Frequent wound vac dressing reinforcement to ensure surrounding skin stays dry. Q2h turns, mepilex in place, pillows in use for support and positioning, low air loss mattress in use with waffle mattress on top. Frequent linen changes to keep skin dry. New dressing to RLE wounds.

## 2020-11-05 NOTE — PROGRESS NOTES
Pulmonary/Critical Care Medicine   Progress Note    Date of service: 11/4/2020  Time: 10:47 PM    Bedside by RN for postoperative bleeding patient.  He has a large amount of pooling blood underneath his wound VAC drape which has pulled away from the skin and no longer is no longer providing a seal for suction from the wound VAC.  The wound VAC has been buttressed with additional drape and a compressive dressing with Ace wrap has been placed over it.  The wound VAC will remain at 75 cmH2O suction.  A TEG has been ordered and will be followed up for product replacement if needed.  Serial hemoglobins have been ordered.  Orthopedic surgery is aware of this postoperative bleeding.    KAMARI Tatum Jr.OAlex  [facility] Critical Care     Walk in

## 2020-11-05 NOTE — PROGRESS NOTES
"Seen and examined.  Doing OK.  Notified by PACU that wound vac output just under 350 mL since surgery, with no increase since 8 PM notification.  Minimal bloody ooze from around dependent portion of wound vac dressing.    /76   Pulse 80   Temp 36.4 °C (97.6 °F) (Temporal)   Resp 17   Ht 1.956 m (6' 5\")   Wt (!) 169.9 kg (374 lb 9 oz)   SpO2 97%     Exam:  LLE with bloody drainage around dependent portion of wound vac.    #1 Left guillotine through knee amputation    Plan:  - Reinforce dressing around wound vac prn  - Shut off wound vac if output >500 mL  - FFP tonight, 1 unit  - CBC in AM  "

## 2020-11-05 NOTE — PROGRESS NOTES
Critical Care Progress Note    Date of admission  10/29/2020    Chief Complaint  54 y.o. male who presented 10/29/2020 with lower extremity infection bilateral that appears to be nec fascitis.  PMH cabg 2019, pafib, heart failure with normal ef, rosanna and copd.  He had an episode of hypoxia this am but now on 2 L and sating adequately. sbp 90-100s.  BNP elevated. CHest xray with edema.  He has been receiving fluids for sepsis. ECHO appears to have reduced ef.  He is alert and oriented, rr mid 20s.  Erythema and swelling to upper thighs bilateral. He is on antibiotics.  Patient currently wants to talk to his brother regarding continued medical therapy and defers ICU transfer currently.  I discussed goals of care and he would like to discuss them with his brother, currently he would rather not be intubated but is not definitive on code status.  He also had hyponatremia to 123.  I have ordered a jenkins catheter and lasix. Lactic acid is normal.  On linezolid, cefepime and flagyl.  Vascular and orthopedics have been consulted and likely will need amputation.  Thrombocytopenia likely from sepsis.      Hospital Course  10/31 admitted to ICU  10/31 CT legs bilaterally negative for abscesses or air.   11/2 O2 requirements increasing, now on 10 L, norepinephrine weaned off, signs of persisting sepsis from his cellulitis in LE, WBC 24.9, still refusing surgery, Clinda/Dapto  11/3 patient now willing to undergo surgery, orthopedic follow-up pending, oxygenation improved, patient off pressors  11/4 L AKA, ancef/zyvox, wound bleeding postop, VAC  11/5 bleeding last night from wound has resolved, follow-up hemoglobin still 15.9, 3 L nasal cannula, hemodynamically stable, Ancef/linezolid    Interval Problem Update  Reviewed last 24 hour events    L AKA yesterday, some wound bleeding post op - VAC adjusted - min further bleeding  Pain controlled  Hgb 15.9 down from 19.3  A&Ox4  SR 70s  SBp 90-110s  UO excellent, neg 4.38L (+ blood  loss)  Renal function continues to improve  4 lpm NC  IS pending  Tm 99.0  WBC 23.6  Ancef/Zyvox  Wound VAC  Amiodarone  Lasix   Glucose levels and insulin dosing reviewed with clinical pharmacist    Resume warfarin when ok w/Ortho  Reduce lasix?  Consider dropping to 20 mg daily  F/u Hgb  Hold anti-coag another day  Transfer to orthopedics if follow-up hemoglobin stable    Reviewed at bedside with resident staff, follow-up hemoglobin unchanged at 15.9, no significant output from VAC from left AKA wound     YESTERDAY  A&O x4  Still significant pain in both lower extremities, worse on the left  SR 70s  SBp 110-130s  UO good  Renal function improved  -2.95 L / 24 hours  Night shift not included in fluid balance?  -22.9 L admit running balance, lower extremity edema improved  Tm 99.5  WBC 21.8  4 lpm NC  NPO for OR  Ancef/Zyvox  INR 1.54  Glucose and SSI reviewed    To OR w/Ortho for amputation LLE /debredment  RLE  Reassess post op  Mg replete  Insulin drip?    Reviewed with the patient the possibility of coming back from the operating room on the ventilator    Review of Systems  Review of Systems   Constitutional: Positive for malaise/fatigue (Persisting/slight better???). Negative for fever.   HENT: Negative for congestion and sore throat.    Respiratory: Negative for cough, sputum production and shortness of breath.    Cardiovascular: Positive for leg swelling (No change on right). Negative for chest pain.   Gastrointestinal: Negative for abdominal pain, diarrhea, nausea and vomiting.   Genitourinary: Negative for dysuria and urgency.   Musculoskeletal: Positive for myalgias (Improved). Negative for back pain.        Leg pain bilaterally   Skin: Negative for rash.   Neurological: Positive for weakness (Diffuse, unchanged). Negative for focal weakness and headaches.   Psychiatric/Behavioral: The patient is not nervous/anxious.    All other systems reviewed and are negative.      Vital Signs for last 24 hours   Temp:   [35.9 °C (96.6 °F)-37.4 °C (99.3 °F)] 37.3 °C (99.1 °F)  Pulse:  [66-96] 72  Resp:  [12-28] 18  BP: ()/(54-97) 117/61  SpO2:  [89 %-100 %] 94 %    Hemodynamic parameters for last 24 hours       Respiratory Information for the last 24 hours       Physical Exam   Physical Exam  Vitals signs reviewed.   Constitutional:       General: He is not in acute distress.     Appearance: He is morbidly obese. He is not ill-appearing, toxic-appearing or diaphoretic.      Interventions: Face mask in place.   HENT:      Head: Normocephalic and atraumatic.      Mouth/Throat:      Mouth: Mucous membranes are moist.   Eyes:      General: No scleral icterus.     Extraocular Movements: Extraocular movements intact.      Pupils: Pupils are equal, round, and reactive to light.   Neck:      Musculoskeletal: Neck supple.      Vascular: No JVD.      Trachea: Phonation normal.   Cardiovascular:      Rate and Rhythm: Normal rate and regular rhythm.      Pulses: Normal pulses.      Heart sounds: Normal heart sounds. No murmur.      Comments: SR w/PVCs  Pulmonary:      Effort: Pulmonary effort is normal. No accessory muscle usage or respiratory distress.      Breath sounds: Normal breath sounds. No wheezing or rales.      Comments: Mildly dyspneic  Abdominal:      General: Abdomen is protuberant. Bowel sounds are normal. There is no distension.      Palpations: Abdomen is soft.      Tenderness: There is no abdominal tenderness. There is no right CVA tenderness, left CVA tenderness or guarding.   Musculoskeletal:         General: No swelling.      Comments: Left AKA with surgical dressing and VAC in place, bleeding reduced, unchanged edematous and bandaged right lower extremity with actually probable improved erythema and tenderness, acceptable capillary refill   Skin:     General: Skin is warm and dry.      Capillary Refill: Capillary refill takes 2 to 3 seconds.      Coloration: Skin is not cyanotic or jaundiced.      Findings:  Ecchymosis, erythema (Slight worse in the left lower extremity) and wound (Bilateral lower extremities) present.      Nails: There is no clubbing.     Neurological:      General: No focal deficit present.      Mental Status: He is alert and oriented to person, place, and time.      Cranial Nerves: No cranial nerve deficit.      Motor: Weakness (Nonfocal/diffuse) present.      Comments: Follows well   Psychiatric:         Attention and Perception: Attention normal.         Speech: Speech normal.         Behavior: Behavior is not agitated. Behavior is cooperative.         Thought Content: Thought content normal.         Cognition and Memory: Cognition normal.       Medications  Current Facility-Administered Medications   Medication Dose Route Frequency Provider Last Rate Last Admin   • insulin regular (HumuLIN R,NovoLIN R) injection  1-6 Units Subcutaneous 4X/DAY ACHS Silvio Eller M.D.   1 Units at 11/05/20 1120    And   • glucose 4 g chewable tablet 16 g  16 g Oral Q15 MIN PRN Silvio Eller M.D.        And   • dextrose 50% (D50W) injection 50 mL  50 mL Intravenous Q15 MIN PRN Silvio Eller M.D.       • [START ON 11/6/2020] furosemide (LASIX) injection 40 mg  40 mg Intravenous Q DAY Adrianna Fabian M.D.       • MD Alert...ICU Electrolyte Replacement per Pharmacy   Other PHARMACY TO DOSE Silvio Eller M.D.       • rosuvastatin (CRESTOR) tablet 40 mg  40 mg Oral QHS Silvio Eller M.D.   40 mg at 11/04/20 2053   • ceFAZolin in dextrose (ANCEF) IVPB premix 2 g  2 g Intravenous Q8HRS Carol Barber M.D. 200 mL/hr at 11/05/20 1505 2 g at 11/05/20 1505   • linezolid (ZYVOX) tablet 600 mg  600 mg Oral Q12HRS Carol Barber M.D.   600 mg at 11/05/20 0548   • HYDROmorphone pf (DILAUDID) injection 0.5-1 mg  0.5-1 mg Intravenous Q2HRS PRN Meng Seymour M.D.   0.5 mg at 11/05/20 0954   • albuterol inhaler 2 Puff  2 Puff Inhalation Q6HRS PRN Jayro Vásquez M.D.   2 Puff at 11/01/20 1901   •  amiodarone (Cordarone) tablet 200 mg  200 mg Oral QAHUI Vásquez M.D.   200 mg at 11/05/20 0548   • ipratropium-albuterol (DUONEB) nebulizer solution  3 mL Nebulization Q6HRS PRN (RT) Jayro Vásquez M.D.       • glycopyrrolate (Seebri) 15.6 mcg inhalation capsule 1 Cap  1 Cap Inhalation BID (RT) Jayro Vásquez M.D.   1 Cap at 11/05/20 1001   • senna-docusate (PERICOLACE or SENOKOT S) 8.6-50 MG per tablet 2 Tab  2 Tab Oral BID Artem Givens M.D.   2 Tab at 11/05/20 0548    And   • polyethylene glycol/lytes (MIRALAX) PACKET 1 Packet  1 Packet Oral QDAY PRN Artem Givens M.D.   1 Packet at 11/05/20 0549    And   • magnesium hydroxide (MILK OF MAGNESIA) suspension 30 mL  30 mL Oral QDAY PRN Artem Givens M.D.   30 mL at 11/05/20 0548    And   • bisacodyl (DULCOLAX) suppository 10 mg  10 mg Rectal QDAY PRN Artem Givens M.D.   10 mg at 11/05/20 1111   • Respiratory Therapy Consult   Nebulization Continuous RT Artem Givens M.D.       • cloNIDine (CATAPRES) tablet 0.1 mg  0.1 mg Oral Q6HRS PRN Artem Givens M.D.       • acetaminophen (TYLENOL) tablet 500 mg  500 mg Oral Q6HRS PRN Artem Givens M.D.       • HYDROcodone/acetaminophen (NORCO)  MG per tablet 1 Tab  1 Tab Oral Q6HRS PRN Artem Givens M.D.   1 Tab at 11/05/20 1015       Fluids    Intake/Output Summary (Last 24 hours) at 11/5/2020 1514  Last data filed at 11/5/2020 0815  Gross per 24 hour   Intake 1336 ml   Output 3460 ml   Net -2124 ml       Laboratory          Recent Labs     11/03/20  0445 11/04/20  0535 11/05/20  0340   SODIUM 126* 130* 132*   POTASSIUM 3.6 4.1 4.4   CHLORIDE 88* 91* 93*   CO2 30 31 35*   BUN 41* 36* 32*   CREATININE 1.16 0.95 0.73   MAGNESIUM 1.7 1.8 2.0   CALCIUM 8.2* 8.4* 8.0*     Recent Labs     11/03/20 0445 11/04/20  0535 11/05/20  0340   GLUCOSE 192* 144* 185*     Recent Labs     11/03/20 0445 11/04/20  0535 11/05/20  0340   WBC 25.5* 21.8* 23.6*   NEUTSPOLYS 80.80* 86.60* 79.50*   LYMPHOCYTES 1.70* 3.10* 0.80*   MONOCYTES  12.50 4.70 9.00   EOSINOPHILS 1.70 1.60 0.00   BASOPHILS 0.80 0.80 0.00     Recent Labs     11/03/20  0445 11/04/20  0535 11/04/20  2335 11/05/20  0340 11/05/20  1005   RBC 6.62* 6.66*  --  5.46  --    HEMOGLOBIN 19.2* 19.3* 16.1 15.9 15.9   HEMATOCRIT 58.9* 59.9* 50.9 49.6  --    PLATELETCT 187 193  --  245  --    PROTHROMBTM 22.6* 18.9*  --   --   --    INR 1.93* 1.54*  --   --   --        Imaging  X-Ray:  I have personally reviewed the images and compared with prior images.  CT:    Reviewed      CT leg : IMPRESSION:  1.  No discrete fluid collection is identified to suggest abscess.  2.  Diffuse subcutaneous edema and overlying skin thickening with venous varicosities.  3.  Atherosclerosis.  4.  Diffuse muscle atrophy.    Assessment/Plan  * Cellulitis  Assessment & Plan  Severe bilateral leg cellulitis. Initial concern for necrotizing fascitis  CT legs bilaterally did not show any evidence of fluid collection/abscess or soft tissue gas.   His erythema not appear worsening compared to yesterday/edema worse  Patient initially on clindamycin and daptomycin, transitioned to Ancef and daptomycin ABX per ID  Continue forced diuresis and elevation  Pt initially refusing surgery at this time.  Status post left AKA 11/4, tolerated reasonably well  Bleeding postop from operative site through VAC, controlled  Clinically appears ready for transfer to orthopedic floor    Right-sided heart failure (HCC)  Assessment & Plan  Tolerating forced diuresis with good response, -27+ liter fluid balance so far plus perhaps 3 unit blood loss with surgery  May be able to reduce Lasix to 20 mg once a day  SHASHANK?  Strongly suspected, suggest pulmonary service follow patient and arrange outpatient PSG  Echocardiogram noted, RVSP 40 consistent with mild-moderate pulmonary hypertension    Sepsis (HCC)  Assessment & Plan  Sepsis due to lower ext infection Likely nec fasc clinically but CT negative for usual abnormalities, slightly worse on left  lower extremity on exam    Ortho and vascular consulted, patient now agreeable to proceed with amputation that is recommended with left lower extremity AKA, orthopedics taken the patient to the OR 11/4  Cellulitis clinically perhaps worse in the left lower extremity certainly not resolving quickly in both lower extremities  Pressors to support MAP >65 and SBP >90, norepinephrine infusion as needed, off for now  Diuresis as tolerated when clinically appropriate  Antibiotics, ID consult reviewed, deescalated to Ancef/linezolid  Serial cultures wound/blood as needed  Hopefully new cultures from the OR 11/4 and fine-tune antibiotic coverage as clinically prudent  Hemodynamically tolerated surgery no significant SIRS reaction    High anion gap metabolic acidosis  Assessment & Plan  Likely from poor perfusion  Improved    Supratherapeutic INR- (present on admission)  Assessment & Plan  Likely in part from sepsis, trending towards normal  Continue to monitor  Transfuse/vitamin K as clinically prudent per protocols  Resume anticoagulation when clinically appropriate and okay with orthopedic surgery    Paroxysmal atrial fibrillation (HCC)- (present on admission)  Assessment & Plan  Cardiac monitoring  On amiodarone and metoprolol, hold metoprolol for low blood pressure-treat with dig if needed  Continue to optimize electrolytes  Anticoagulate as clinically appropriate, holding since he is going to the OR, resume postop when okay with surgery    LINSEY (acute kidney injury) (HCC)  Assessment & Plan  Improved again my creatinine normal and less than 1 and BUN approaching normal  Acute kidney injury 2/2 likely ATN due to sepsis  Lasix reduced to 40 mg twice daily, still diuresing nicely, may be able to reduce Lasix to 20 mg once a day  Monitor UOP closely, strict I/Os      Elevated LFTs  Assessment & Plan  Likely from poor perfusion  Serial CMP  Avoid hepatotoxins    Polycythemia  Assessment & Plan  Serial CBC  O2 supplementation,  PAP therapy as needed  Eval for SHASHANK when clinically appropriate  Phlebotomy unlikely necessary now, blood loss post amputation dropped hemoglobin 3 g to 15.9, bleeding controlled and hemoglobin unchanged  Continue to monitor    Elevated troponin- (present on admission)  Assessment & Plan  Likely type 2 MI from poor perfusion from sepsis, heart failure  ECHO noted, repeat pending  Monitor    Morbid obesity with BMI of 40.0-44.9, adult ([facility])- (present on admission)  Assessment & Plan  Behavioral modification weight loss encouraged long-term  Much of the weight is water weight, ongoing diuresis  SHASHANK evaluation as an outpatient, okay to consult pulmonary service prior to discharge to facilitate  Most recent TSH normal    Heart failure with preserved ejection fraction, borderline, class IV ([facility])- (present on admission)  Assessment & Plan  Prior hx, current echo appears reduced ef?  Diuresis as tolerated, nearly 28 L negative fluid balance with continuing to improve renal function  On metoprolol      Type 2 diabetes mellitus with complication, without long-term current use of insulin ([facility])- (present on admission)  Assessment & Plan  Continue SSI, adjust long-acting insulin as nutritional intakes improves postop  Monitor for the need for continuous insulin infusion, doing well postop  Hypoglycemia protocols  A1c 7.8    COPD (chronic obstructive pulmonary disease) ([facility])  Assessment & Plan  Not in exacerbation  Duonebs prn  Albuterol prn  On seebri bid  Update vaccines prior to discharge    Hyponatremia  Assessment & Plan  Persisting, mildly improved  Reassess volume status daily  Lasix/fluid restriction as tolerated  Low EF  Continue serial BMP       VTE:  Heparin  Ulcer: Not Indicated  Lines: Central Line  Ongoing indication addressed and Ceballos Catheter  Ongoing indication addressed    I have performed a physical exam and reviewed and updated ROS and Plan today (11/5/2020). In review of yesterday's note (11/4/2020), there  are no changes except as documented above.     Discussed patient condition and risk of morbidity and/or mortality with RN, RT, Pharmacy, [facility] resident, Charge nurse / hot rounds, Patient and orthopedics    Patient did well intra and postoperatively except for some wound bleeding which is stopped.  Neurologically, hemodynamically and from a respiratory standpoint stable.  Renal function better.  Have monitored throughout the day without any further problems and hemoglobin unchanged.  Will transfer to orthopedic floor and [facility] internal medicine team to transition to primary.  Suggest pulmonary consult service see the patient for sleep apnea and outpatient polysomnogram be obtained when clinically prudent.  Continue diuresis for now but may be able to get by with switching to 20 mg once a day, he has had nearly 28 L diuresis.

## 2020-11-05 NOTE — PROGRESS NOTES
2030 Pt arrived to unit. Wound vac in place, drainage to canister at 350. Dressing to LLE CD&I. Dr. Durbin at bedside. Order for FFP placed.    2100 New drainage noted to dressing with coagulated blood surrounding wound vac insertion to leg. Cannister now at 400. TRENTON Preston updated, order to reinforce dressing, turn wound vac pressure down to 75 from 125 and continue with FFP transfusion per Dr. Sheldon order.    2130 Dressing changed, LLE elevated, new cannister placed to wound vac.     2240 TRENTON Preston updated on continued bleeding, bleeding through two abdomenal dressings within the hour along with the new cannister output of 150cc. Order to turn wound vac off, reinforce dressing and place 6 inch ACE wrap. Order placed for H&H now and with morning labs. This RN asked if TRENTON Preston was ok if Intensivist was consulted to bedside, verbalized agreement

## 2020-11-06 LAB
ANION GAP SERPL CALC-SCNC: 6 MMOL/L (ref 7–16)
BACTERIA BLD CULT: NORMAL
BACTERIA BLD CULT: NORMAL
BASOPHILS # BLD AUTO: 0 % (ref 0–1.8)
BASOPHILS # BLD: 0 K/UL (ref 0–0.12)
BUN SERPL-MCNC: 23 MG/DL (ref 8–22)
CALCIUM SERPL-MCNC: 8.2 MG/DL (ref 8.5–10.5)
CHLORIDE SERPL-SCNC: 89 MMOL/L (ref 96–112)
CO2 SERPL-SCNC: 34 MMOL/L (ref 20–33)
CREAT SERPL-MCNC: 0.79 MG/DL (ref 0.5–1.4)
EOSINOPHIL # BLD AUTO: 0.3 K/UL (ref 0–0.51)
EOSINOPHIL NFR BLD: 2 % (ref 0–6.9)
ERYTHROCYTE [DISTWIDTH] IN BLOOD BY AUTOMATED COUNT: 60.2 FL (ref 35.9–50)
GLUCOSE BLD-MCNC: 138 MG/DL (ref 65–99)
GLUCOSE BLD-MCNC: 168 MG/DL (ref 65–99)
GLUCOSE BLD-MCNC: 171 MG/DL (ref 65–99)
GLUCOSE BLD-MCNC: 190 MG/DL (ref 65–99)
GLUCOSE SERPL-MCNC: 160 MG/DL (ref 65–99)
HCT VFR BLD AUTO: 46.6 % (ref 42–52)
HGB BLD-MCNC: 14.7 G/DL (ref 14–18)
INR PPP: 1.51 (ref 0.87–1.13)
LYMPHOCYTES # BLD AUTO: 0.75 K/UL (ref 1–4.8)
LYMPHOCYTES NFR BLD: 5 % (ref 22–41)
MACROCYTES BLD QL SMEAR: ABNORMAL
MAGNESIUM SERPL-MCNC: 2 MG/DL (ref 1.5–2.5)
MANUAL DIFF BLD: NORMAL
MCH RBC QN AUTO: 28.7 PG (ref 27–33)
MCHC RBC AUTO-ENTMCNC: 31.5 G/DL (ref 33.7–35.3)
MCV RBC AUTO: 91 FL (ref 81.4–97.8)
MONOCYTES # BLD AUTO: 0.15 K/UL (ref 0–0.85)
MONOCYTES NFR BLD AUTO: 1 % (ref 0–13.4)
MORPHOLOGY BLD-IMP: NORMAL
NEUTROPHILS # BLD AUTO: 13.71 K/UL (ref 1.82–7.42)
NEUTROPHILS NFR BLD: 90 % (ref 44–72)
NEUTS BAND NFR BLD MANUAL: 2 % (ref 0–10)
NRBC # BLD AUTO: 0 K/UL
NRBC BLD-RTO: 0 /100 WBC
OVALOCYTES BLD QL SMEAR: NORMAL
PLATELET # BLD AUTO: 244 K/UL (ref 164–446)
PLATELET BLD QL SMEAR: NORMAL
PMV BLD AUTO: 9.4 FL (ref 9–12.9)
POIKILOCYTOSIS BLD QL SMEAR: NORMAL
POTASSIUM SERPL-SCNC: 5 MMOL/L (ref 3.6–5.5)
PROTHROMBIN TIME: 18.7 SEC (ref 12–14.6)
RBC # BLD AUTO: 5.12 M/UL (ref 4.7–6.1)
RBC BLD AUTO: PRESENT
SIGNIFICANT IND 70042: NORMAL
SIGNIFICANT IND 70042: NORMAL
SITE SITE: NORMAL
SITE SITE: NORMAL
SODIUM SERPL-SCNC: 129 MMOL/L (ref 135–145)
SOURCE SOURCE: NORMAL
SOURCE SOURCE: NORMAL
WBC # BLD AUTO: 14.9 K/UL (ref 4.8–10.8)

## 2020-11-06 PROCEDURE — 700111 HCHG RX REV CODE 636 W/ 250 OVERRIDE (IP): Performed by: STUDENT IN AN ORGANIZED HEALTH CARE EDUCATION/TRAINING PROGRAM

## 2020-11-06 PROCEDURE — 700102 HCHG RX REV CODE 250 W/ 637 OVERRIDE(OP): Performed by: PHYSICIAN ASSISTANT

## 2020-11-06 PROCEDURE — A9270 NON-COVERED ITEM OR SERVICE: HCPCS | Performed by: STUDENT IN AN ORGANIZED HEALTH CARE EDUCATION/TRAINING PROGRAM

## 2020-11-06 PROCEDURE — 700102 HCHG RX REV CODE 250 W/ 637 OVERRIDE(OP): Performed by: STUDENT IN AN ORGANIZED HEALTH CARE EDUCATION/TRAINING PROGRAM

## 2020-11-06 PROCEDURE — 83735 ASSAY OF MAGNESIUM: CPT

## 2020-11-06 PROCEDURE — 700111 HCHG RX REV CODE 636 W/ 250 OVERRIDE (IP): Performed by: INTERNAL MEDICINE

## 2020-11-06 PROCEDURE — 85027 COMPLETE CBC AUTOMATED: CPT

## 2020-11-06 PROCEDURE — 94640 AIRWAY INHALATION TREATMENT: CPT

## 2020-11-06 PROCEDURE — 36415 COLL VENOUS BLD VENIPUNCTURE: CPT

## 2020-11-06 PROCEDURE — A9270 NON-COVERED ITEM OR SERVICE: HCPCS | Performed by: INTERNAL MEDICINE

## 2020-11-06 PROCEDURE — 700102 HCHG RX REV CODE 250 W/ 637 OVERRIDE(OP): Performed by: INTERNAL MEDICINE

## 2020-11-06 PROCEDURE — 700111 HCHG RX REV CODE 636 W/ 250 OVERRIDE (IP): Performed by: PHYSICIAN ASSISTANT

## 2020-11-06 PROCEDURE — 99233 SBSQ HOSP IP/OBS HIGH 50: CPT | Mod: GC | Performed by: HOSPITALIST

## 2020-11-06 PROCEDURE — 99232 SBSQ HOSP IP/OBS MODERATE 35: CPT | Performed by: NURSE PRACTITIONER

## 2020-11-06 PROCEDURE — A9270 NON-COVERED ITEM OR SERVICE: HCPCS | Performed by: PHYSICIAN ASSISTANT

## 2020-11-06 PROCEDURE — 80048 BASIC METABOLIC PNL TOTAL CA: CPT

## 2020-11-06 PROCEDURE — 770006 HCHG ROOM/CARE - MED/SURG/GYN SEMI*

## 2020-11-06 PROCEDURE — 85007 BL SMEAR W/DIFF WBC COUNT: CPT

## 2020-11-06 PROCEDURE — 82962 GLUCOSE BLOOD TEST: CPT

## 2020-11-06 PROCEDURE — 85610 PROTHROMBIN TIME: CPT

## 2020-11-06 RX ORDER — LIDOCAINE HYDROCHLORIDE 20 MG/ML
20 INJECTION, SOLUTION INFILTRATION; PERINEURAL
Status: DISCONTINUED | OUTPATIENT
Start: 2020-11-06 | End: 2020-11-16 | Stop reason: HOSPADM

## 2020-11-06 RX ORDER — LIDOCAINE HYDROCHLORIDE 40 MG/ML
SOLUTION TOPICAL
Status: DISCONTINUED | OUTPATIENT
Start: 2020-11-06 | End: 2020-11-16 | Stop reason: HOSPADM

## 2020-11-06 RX ORDER — HYDROMORPHONE HYDROCHLORIDE 1 MG/ML
0.5 INJECTION, SOLUTION INTRAMUSCULAR; INTRAVENOUS; SUBCUTANEOUS ONCE
Status: DISCONTINUED | OUTPATIENT
Start: 2020-11-06 | End: 2020-11-06

## 2020-11-06 RX ORDER — HYDROMORPHONE HYDROCHLORIDE 1 MG/ML
1 INJECTION, SOLUTION INTRAMUSCULAR; INTRAVENOUS; SUBCUTANEOUS ONCE
Status: COMPLETED | OUTPATIENT
Start: 2020-11-06 | End: 2020-11-06

## 2020-11-06 RX ADMIN — CEFAZOLIN SODIUM 2 G: 2 INJECTION, SOLUTION INTRAVENOUS at 14:17

## 2020-11-06 RX ADMIN — HYDROMORPHONE HYDROCHLORIDE 1 MG: 1 INJECTION, SOLUTION INTRAMUSCULAR; INTRAVENOUS; SUBCUTANEOUS at 12:02

## 2020-11-06 RX ADMIN — ENOXAPARIN SODIUM 40 MG: 40 INJECTION SUBCUTANEOUS at 14:17

## 2020-11-06 RX ADMIN — HYDROCODONE BITARTRATE AND ACETAMINOPHEN 1 TABLET: 10; 325 TABLET ORAL at 11:37

## 2020-11-06 RX ADMIN — GABAPENTIN 200 MG: 100 CAPSULE ORAL at 11:37

## 2020-11-06 RX ADMIN — LINEZOLID 600 MG: 600 TABLET, FILM COATED ORAL at 18:14

## 2020-11-06 RX ADMIN — GABAPENTIN 200 MG: 100 CAPSULE ORAL at 18:14

## 2020-11-06 RX ADMIN — AMIODARONE HYDROCHLORIDE 200 MG: 200 TABLET ORAL at 05:27

## 2020-11-06 RX ADMIN — HYDROCODONE BITARTRATE AND ACETAMINOPHEN 1 TABLET: 10; 325 TABLET ORAL at 18:14

## 2020-11-06 RX ADMIN — CEFAZOLIN SODIUM 2 G: 2 INJECTION, SOLUTION INTRAVENOUS at 05:43

## 2020-11-06 RX ADMIN — CEFAZOLIN SODIUM 2 G: 2 INJECTION, SOLUTION INTRAVENOUS at 20:58

## 2020-11-06 RX ADMIN — FUROSEMIDE 40 MG: 10 INJECTION, SOLUTION INTRAMUSCULAR; INTRAVENOUS at 05:29

## 2020-11-06 RX ADMIN — ROSUVASTATIN CALCIUM 40 MG: 20 TABLET, FILM COATED ORAL at 20:58

## 2020-11-06 RX ADMIN — HYDROMORPHONE HYDROCHLORIDE 0.5 MG: 1 INJECTION, SOLUTION INTRAMUSCULAR; INTRAVENOUS; SUBCUTANEOUS at 01:24

## 2020-11-06 RX ADMIN — ALBUTEROL SULFATE 2 PUFF: 90 AEROSOL, METERED RESPIRATORY (INHALATION) at 05:47

## 2020-11-06 RX ADMIN — GLYCOPYRROLATE 1 CAPSULE: 15.6 CAPSULE RESPIRATORY (INHALATION) at 06:40

## 2020-11-06 RX ADMIN — DOCUSATE SODIUM 50 MG AND SENNOSIDES 8.6 MG 2 TABLET: 8.6; 5 TABLET, FILM COATED ORAL at 05:26

## 2020-11-06 RX ADMIN — HYDROCODONE BITARTRATE AND ACETAMINOPHEN 1 TABLET: 10; 325 TABLET ORAL at 05:26

## 2020-11-06 RX ADMIN — HYDROMORPHONE HYDROCHLORIDE 1 MG: 1 INJECTION, SOLUTION INTRAMUSCULAR; INTRAVENOUS; SUBCUTANEOUS at 10:20

## 2020-11-06 RX ADMIN — GABAPENTIN 200 MG: 100 CAPSULE ORAL at 05:26

## 2020-11-06 RX ADMIN — GLYCOPYRROLATE 1 CAPSULE: 15.6 CAPSULE RESPIRATORY (INHALATION) at 20:58

## 2020-11-06 RX ADMIN — LINEZOLID 600 MG: 600 TABLET, FILM COATED ORAL at 05:28

## 2020-11-06 ASSESSMENT — PAIN DESCRIPTION - PAIN TYPE
TYPE: SURGICAL PAIN
TYPE: ACUTE PAIN

## 2020-11-06 ASSESSMENT — ENCOUNTER SYMPTOMS
DIAPHORESIS: 0
FEVER: 0
FLANK PAIN: 0
HEARTBURN: 0
DEPRESSION: 0
BACK PAIN: 0
WEAKNESS: 0
ABDOMINAL PAIN: 0
TINGLING: 0
TREMORS: 0
WEIGHT LOSS: 0
HALLUCINATIONS: 0
ORTHOPNEA: 0
DIARRHEA: 0
MYALGIAS: 0
HEMOPTYSIS: 0
LOSS OF CONSCIOUSNESS: 0
SPUTUM PRODUCTION: 0
PALPITATIONS: 0
NAUSEA: 0
COUGH: 0
MEMORY LOSS: 0
SHORTNESS OF BREATH: 0
BRUISES/BLEEDS EASILY: 0
POLYDIPSIA: 0
CONSTIPATION: 0
CHILLS: 0
VOMITING: 0
DIZZINESS: 0
HEADACHES: 0

## 2020-11-06 ASSESSMENT — LIFESTYLE VARIABLES: SUBSTANCE_ABUSE: 0

## 2020-11-06 NOTE — PROGRESS NOTES
"   Orthopaedic Progress Note    Interval changes:  Patient doing well post op  Vac dressing in place without leak  Gabapentin 200mg po TID added for phantom pain issues  Return to OR next week for amp completion pending resolution of residual cellulitis    ROS - Patient denies any new issues, except per above.       BP (!) 99/72   Pulse 68   Temp 36.3 °C (97.4 °F) (Temporal)   Resp 18   Ht 1.956 m (6' 5\")   Wt (!) 148.3 kg (326 lb 15.1 oz)   SpO2 95%       Patient seen and examined  No acute distress  Breathing non labored  RRR  LLE dressing CDI, no contracture noted.       Recent Labs     11/03/20  0445 11/04/20  0535 11/04/20  2335 11/05/20  0340 11/05/20  1005   WBC 25.5* 21.8*  --  23.6*  --    RBC 6.62* 6.66*  --  5.46  --    HEMOGLOBIN 19.2* 19.3* 16.1 15.9 15.9   HEMATOCRIT 58.9* 59.9* 50.9 49.6  --    MCV 89.0 89.9  --  90.8  --    MCH 29.0 29.0  --  29.1  --    MCHC 32.6* 32.2*  --  32.1*  --    RDW 59.6* 60.2*  --  60.1*  --    PLATELETCT 187 193  --  245  --    MPV 10.7 9.6  --  9.4  --        Active Hospital Problems    Diagnosis   • Right-sided heart failure (HCC) [I50.810]     Priority: High   • Sepsis (HCC) [A41.9]     Priority: High   • Cellulitis [L03.90]     Priority: High   • High anion gap metabolic acidosis [E87.2]     Priority: Medium   • Supratherapeutic INR [R79.1]     Priority: Medium   • Paroxysmal atrial fibrillation (HCC) [I48.0]     Priority: Medium   • LINSEY (acute kidney injury) (HCC) [N17.9]     Priority: Medium   • Polycythemia [D75.1]     Priority: Low   • Elevated troponin [R77.8]     Priority: Low   • Morbid obesity with BMI of 40.0-44.9, adult ([facility]) [E66.01, Z68.41]     Priority: Low   • Heart failure with preserved ejection fraction, borderline, class IV ([facility]) [I50.30]     Priority: Low   • Type 2 diabetes mellitus with complication, without long-term current use of insulin ([facility]) [E11.8]     Priority: Low   • COPD (chronic obstructive pulmonary disease) ([facility]) [J44.9]     " Priority: Low   • Hyponatremia [E87.1]     Priority: Low   • Elevated LFTs [R79.89]       Assessment/Plan:  Gabapentin 200mg po tid for pain added  POD#1 S/P Left guillotine through-knee amputation  Wt bearing status - NWB LLE  Wound care/Drains - vac in place   Future Procedures - next week amp completion  Sutures/Staples out- 14-21 days post operatively  PT/OT-initiated  Antibiotics: ancef 2g IV q8, zyvox 600mg po BID  DVT Prophylaxis- TEDS/SCDs/Foot pumps  Ceballos-none  Case Coordination for Discharge Planning - Disposition rehab

## 2020-11-06 NOTE — PROGRESS NOTES
"S:  Seen and examined.  POD #2 s/p L guillotine through knee amputation.  Doing well this morning.  No new complaints, pain controlled.    O: /68   Pulse 71   Temp 36.1 °C (97 °F) (Temporal)   Resp 17   Ht 1.956 m (6' 5\")   Wt (!) 148.3 kg (326 lb 15.1 oz)   SpO2 95% .      Intake/Output Summary (Last 24 hours) at 11/6/2020 1251  Last data filed at 11/6/2020 0900  Gross per 24 hour   Intake 420 ml   Output 1500 ml   Net -1080 ml   .    Operative/injured extremity examined.   Wound vac to suction    Recent Labs     11/04/20  0535 11/04/20  2335 11/05/20  0340 11/05/20  1005   WBC 21.8*  --  23.6*  --    RBC 6.66*  --  5.46  --    HEMOGLOBIN 19.3* 16.1 15.9 15.9   HEMATOCRIT 59.9* 50.9 49.6  --    MCV 89.9  --  90.8  --    MCH 29.0  --  29.1  --    MCHC 32.2*  --  32.1*  --    RDW 60.2*  --  60.1*  --    PLATELETCT 193  --  245  --    MPV 9.6  --  9.4  --        A/P:    POD #2 s/p L guillotine through knee amputation    Antibiotics: Per medicine  Activity: NWB operative extremity.  PT today.  Diet: General  DVT: Mechanical (SCDs) + Pharmacologic (Per medicine, no Coumadin)  Dispo: D/C planning    "

## 2020-11-06 NOTE — PROGRESS NOTES
Daily Progress Note:     Date of Service: 11/6/2020  Primary Team: [facility]   Attending: NOAM Talavera M.D.   Senior Resident: Dr. Leyva  Intern: Dr. Vargas  Contact:  [phone]    Chief Complaint:   Bilateral Lower Extremity Cellulitis    Subjective:   POD #2 s/p L guillotine through knee amputation. No acute events overnight. Patient denies any new or worsening pains, especially his leg and left Lower Extremity. Patient is now at 2-3L NC. Patient had 1 BM 11/05.    Interval Events:  D/C Lasix  Started Lovenox  PT/INR  Con't to hold Metoprolol  PT today  D/C Palliative Consult    Consultants/Specialty:  Cardiology  Vascular surgery  Orthopedic surgery   ID    Review of Systems:   Review of Systems   Constitutional: Negative for chills, diaphoresis, fever and weight loss.   HENT: Negative for ear pain, hearing loss and tinnitus.    Respiratory: Negative for cough, hemoptysis, sputum production and shortness of breath.    Cardiovascular: Positive for leg swelling. Negative for chest pain, palpitations and orthopnea.   Gastrointestinal: Negative for abdominal pain, constipation, diarrhea, heartburn, nausea and vomiting.   Genitourinary: Negative for dysuria, flank pain, hematuria and urgency.   Musculoskeletal: Negative for back pain, joint pain and myalgias.   Skin: Negative for itching and rash.        +Right leg wounds.   Neurological: Negative for dizziness, tingling, tremors, loss of consciousness, weakness and headaches.   Endo/Heme/Allergies: Negative for polydipsia. Does not bruise/bleed easily.   Psychiatric/Behavioral: Negative for depression, hallucinations, memory loss, substance abuse and suicidal ideas.       Objective Data:   Physical Exam:   Vitals:   Temp:  [35.8 °C (96.4 °F)-36.5 °C (97.7 °F)] 36.1 °C (97 °F)  Pulse:  [64-76] 71  Resp:  [16-20] 17  BP: ()/(64-75) 106/68  SpO2:  [93 %-100 %] 95 %     Physical Exam  Vitals signs reviewed.   Constitutional:       Appearance: He is  obese.      Comments: Cooperative and pleasant mood. Desires to leave to see his grand kids.   HENT:      Head: Normocephalic and atraumatic.      Nose: Nose normal. No congestion.      Mouth/Throat:      Mouth: Mucous membranes are moist.      Pharynx: Oropharynx is clear. No oropharyngeal exudate.   Eyes:      Extraocular Movements: Extraocular movements intact.      Conjunctiva/sclera: Conjunctivae normal.      Pupils: Pupils are equal, round, and reactive to light.   Neck:      Musculoskeletal: Normal range of motion and neck supple.      Comments: +JVD  Cardiovascular:      Rate and Rhythm: Normal rate and regular rhythm.      Pulses: Normal pulses.      Heart sounds: Normal heart sounds.   Pulmonary:      Effort: Pulmonary effort is normal. No respiratory distress.      Breath sounds: No wheezing.      Comments: Decreased breath sounds in bases. No crackles or increase work of breathing.  Chest:      Chest wall: No tenderness.   Abdominal:      General: Abdomen is flat. Bowel sounds are normal. There is no distension.      Palpations: Abdomen is soft.      Tenderness: There is no abdominal tenderness.      Hernia: A hernia (right umbilical hernia +, reducible) is present.   Musculoskeletal:         General: Swelling and tenderness present.   Skin:     General: Skin is warm.      Capillary Refill: Capillary refill takes less than 2 seconds.      Findings: Lesion present.      Comments: LLE: s/p through knee amputation, dressing clean and intact. Wound vac in place.   Right lower extremity swelling and redness: chronic changes. No palpable crepitus. Leg wrapped in bandages    Neurological:      Mental Status: He is alert and oriented to person, place, and time. Mental status is at baseline.      Cranial Nerves: No cranial nerve deficit.      Motor: No weakness.   Psychiatric:         Mood and Affect: Mood normal.         Behavior: Behavior normal.         Thought Content: Thought content normal.          Judgment: Judgment normal.           Labs:   Recent Labs     11/04/20  0535 11/04/20  2335 11/05/20  0340 11/05/20  1005 11/06/20  1336   WBC 21.8*  --  23.6*  --  14.9*   RBC 6.66*  --  5.46  --  5.12   HEMOGLOBIN 19.3* 16.1 15.9 15.9 14.7   HEMATOCRIT 59.9* 50.9 49.6  --  46.6   MCV 89.9  --  90.8  --  91.0   MCH 29.0  --  29.1  --  28.7   RDW 60.2*  --  60.1*  --  60.2*   PLATELETCT 193  --  245  --  244   MPV 9.6  --  9.4  --  9.4   NEUTSPOLYS 86.60*  --  79.50*  --   --    LYMPHOCYTES 3.10*  --  0.80*  --   --    MONOCYTES 4.70  --  9.00  --   --    EOSINOPHILS 1.60  --  0.00  --   --    BASOPHILS 0.80  --  0.00  --   --    RBCMORPHOLO Present  --  Present  --   --      Recent Labs     11/04/20  0535 11/05/20  0340 11/06/20  1336   SODIUM 130* 132* 129*   POTASSIUM 4.1 4.4 5.0   CHLORIDE 91* 93* 89*   CO2 31 35* 34*   GLUCOSE 144* 185* 160*   BUN 36* 32* 23*        Imaging:   EC-ECHOCARDIOGRAM LTD W/ CONT   Final Result      DX-CHEST-LIMITED (1 VIEW)   Final Result      1.  Right internal jugular catheter is been placed and appears appropriately located      2.  Moderate pulmonary edema      3.  Enlarged cardiac silhouette      CT-EXTREMITY, LOWER W/O RIGHT   Final Result      1.  No discrete fluid collection is identified to suggest abscess.      2.  Diffuse subcutaneous edema and overlying skin thickening with venous varicosities.      3.  Atherosclerosis.      4.  Diffuse muscle atrophy.      CT-EXTREMITY, LOWER W/O LEFT   Final Result      1.  Left leg cellulitis without abscess or soft tissue gas      2.  Small vessel atherosclerotic plaque      3.  osteoarthritis of the left knee and left midfoot      4.  Muscular atrophy      EC-ECHOCARDIOGRAM COMPLETE W/O CONT   Final Result      DX-CHEST-PORTABLE (1 VIEW)   Final Result         1.  Pulmonary edema and/or infiltrates are identified, which are stable since the prior exam.   2.  Cardiomegaly      US-EXTREMITY ARTERY LOWER BILAT   Final Result       US-EXTREMITY VENOUS LOWER BILAT   Final Result      DX-TIBIA AND FIBULA RIGHT   Final Result         1.  No acute traumatic bony injury.   2.  Tricompartmental degenerative changes of the knee   3.  Atherosclerosis      DX-TIBIA AND FIBULA LEFT   Final Result   Addendum 1 of 1   Addendum: Subtle clustered punctate lucencies lateral to the ankle are    seen which could represent tiny foci of soft tissue gas.      These findings were discussed with the patient's clinician, ELIZABETH HILARIO,    on 10/30/2020 12:34 AM.      Final      DX-CHEST-PORTABLE (1 VIEW)   Final Result         1.  Interstitial pulmonary parenchymal prominence, compatible with interstitial edema and/or infiltrates.   2.  Cardiomegaly          Problem Representation:     54 y.o. male w/ CAD/CABG 2019, Afib, COPD, DM, and PAD who presented 10/29/2020 with LLE cellulitis, strep sepsis, and severe leg pain. 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    * Cellulitis  Assessment & Plan  -Severe cellulitis of bilateral lower extremities  -? Necrotizing fasciitis but CT negative  -Continue antibiotics per ID recs, currently on cefazolin and linezolid  -Vascular surgery, Orthopedics and ID are following  -Pain management - PO Norco and IV dilaudid PRN  -s/p Left through knee amputation on 11/4, POD2. Revision surgery planned for next week  -right LE: c/c changes, debridement deferred  -Con't PT and wound care    Sepsis (HCC)  Assessment & Plan  -Likely from infection in bilateral lower extremities  -? Necrotizing fasciitis but CT negative  -Orthopedic surgery,Vascular surgery,ID - following  -10/29: BC - GAS; repeat BC 11/1: NGTD  -10/30: WC - GAS, MSSA    PLAN:  -s/p Left through knee amputation, POD1. Revision surgery planned for next week  -Started Lovenox, can switch to Warfarin pending PT/INR  -continue Cefazolin and Linezolid  -Follow blood cultures  -Maintain MAP > 65  -Off pressors      High anion gap metabolic  acidosis  Assessment & Plan  -RESOLVED  -Likely from sepsis and poor perfusion    Supratherapeutic INR- (present on admission)  Assessment & Plan  RESOLVED  -At admission INR 4.46, Likely from sepsis  -Warfarin held on admission  -11/3: INR 1.93  --Started Lovenox, can switch to Warfarin pending PT/INR    Paroxysmal atrial fibrillation (HCC)- (present on admission)  Assessment & Plan  -Controlled on metoprolol, amiodarone  -[id number]: 4 points; HASBLED: 2 points.  -On warfarin with supratherapeutic INR (4.46 10/30/2020); likely from sepsis    PLAN:  -Continue amiodarone  -Started Lovenox, can switch to Warfarin pending PT/INR  -resume metoprolol when appropriate  -Goal for K>4, Mg>2    LINSEY (acute kidney injury) ([facility])  Assessment & Plan  Resolved  -Likely ATN from sepsis vs cardiorenal  -D/C Lasix  -Monitor UOP, I&Os  -Avoid nephrotoxins    Elevated LFTs  Assessment & Plan  -Likely from sepsis  -Monitor CMP    Polycythemia  Assessment & Plan  -Chronic  -Likely 2/2 chronic hypoxia due to COPD vs SHASHANK  -Daily labs as inpatient  -F/U with PCP for outpatient sleep studies    Elevated troponin- (present on admission)  Assessment & Plan  -with elevated BNP, no acute ischemic changes on EKG  -Demand ischemia, Type 2 MI, in the setting of heart failure and sepsis  -11/2/20: Repeat ECHO - EF 50%, RVSP 40, mild AS    Morbid obesity with BMI of 40.0-44.9, adult ([facility])- (present on admission)  Assessment & Plan  -BMI 44.42  -Encourage mobility  -Weight loss counseling when appropriate    Heart failure with preserved ejection fraction, borderline, class IV ([facility])- (present on admission)  Assessment & Plan  -Continue diuresis, lasix changed to IV lasix 40 mg BID on 11/3/2020  -resume metoprolol when appropriate      Type 2 diabetes mellitus with complication, without long-term current use of insulin ([facility])- (present on admission)  Assessment & Plan  -HbA1C 7.8  -ISS  -Hypoglycemia protocol    COPD (chronic obstructive pulmonary  disease) (HCC)  Assessment & Plan  Stable  Continue home inhalers  Supplemental O2  RT protocol    Hyponatremia  Assessment & Plan  IVC dilated on ECHO  Continue diuresis  Monitor sodium    Devin Vargas M.D.    Please note that this dictation was created using voice recognition software. I have worked with technical experts from [facility] to optimize the interface.  I have made every reasonable attempt to correct obvious errors, but there may be errors of grammar and possibly content that I did not discover before finalizing the note.

## 2020-11-06 NOTE — PROGRESS NOTES
Received report from ICU team that patient will be transferred to Medical team. Will start care 11/06/2020.

## 2020-11-06 NOTE — PROGRESS NOTES
"S:  Seen and examined.  POD #1 s/p L guillotine through knee amputation.  Doing well this evening.  No new complaints, pain controlled.  Overnight, drainage continued at a slower rate after wound vac settings changed to 75 mmHg.  Today, his drainage has slowed considerably.    O: /66   Pulse 71   Temp 36.2 °C (97.2 °F) (Temporal)   Resp 18   Ht 1.956 m (6' 5\")   Wt (!) 148.3 kg (326 lb 15.1 oz)   SpO2 96% .      Intake/Output Summary (Last 24 hours) at 11/5/2020 1937  Last data filed at 11/5/2020 1800  Gross per 24 hour   Intake 1336 ml   Output 3570 ml   Net -2234 ml   .    Operative/injured extremity examined.  Wound vac to suction, ACE bandage in place, dry.    Recent Labs     11/03/20  0445 11/04/20  0535 11/04/20  2335 11/05/20  0340 11/05/20  1005   WBC 25.5* 21.8*  --  23.6*  --    RBC 6.62* 6.66*  --  5.46  --    HEMOGLOBIN 19.2* 19.3* 16.1 15.9 15.9   HEMATOCRIT 58.9* 59.9* 50.9 49.6  --    MCV 89.0 89.9  --  90.8  --    MCH 29.0 29.0  --  29.1  --    MCHC 32.6* 32.2*  --  32.1*  --    RDW 59.6* 60.2*  --  60.1*  --    PLATELETCT 187 193  --  245  --    MPV 10.7 9.6  --  9.4  --        A/P:    POD #1 s/p L guillotine through knee amputation    Antibiotics: Per ID  Activity: NWB operative extremity.  PT today.  Diet: General  DVT: Mechanical (SCDs) + Pharmacologic (per medicine, hold Coumadin given elevated INR)  Dressings:  Wound vac changes to begin tomorrow  Dispo: Return to OR possibly next week for definitive AKA    "

## 2020-11-06 NOTE — PROGRESS NOTES
Report received from Lissy COPELAND. Joshua arrived on the floor in stable condition and was oriented to the call light system. VSS. Cont pulse ox placed. Hemovac in place with no leak detected. Cannister changed. Air overlay mattress in use. Pt refusing bed alarm, Risks explained and safety reinforced. Pt agrees to call for assist.    2RN skin check performed with Renae COPELAND.  LDA's currently existing for LLE wound vac, RLE wounds, and R hand.  Observed was large bruise over right pectoral region extending over right sided rib cage to right flank. Pt reports that it was present prior to admission.

## 2020-11-06 NOTE — PROGRESS NOTES
Report given to MIN Rowe. Pt sent via unit transport to [facility] with cell phone, cell phone , toiletries, and clothing. VSS at time of transfer/

## 2020-11-06 NOTE — CARE PLAN
Problem: Bowel/Gastric:  Goal: Normal bowel function is maintained or improved  Outcome: PROGRESSING AS EXPECTED  Note: Suppository given today. Pt had medium sized BM. Encouraging scheduled stool softeners to maintain normal bowel function.      Problem: Skin Integrity  Goal: Risk for impaired skin integrity will decrease  Outcome: PROGRESSING AS EXPECTED  Intervention: Assess and monitor skin integrity, appearance and/or temperature  Note: Skin assessed at start of shift. Nutrition, moisture, sensory, activity, mobility, friction and shear assessed and addressed with patient. Kirit documented. Pt on waffle cushion. Refusing turning at times.

## 2020-11-07 LAB
ANION GAP SERPL CALC-SCNC: 6 MMOL/L (ref 7–16)
ANISOCYTOSIS BLD QL SMEAR: ABNORMAL
BASOPHILS # BLD AUTO: 0 % (ref 0–1.8)
BASOPHILS # BLD: 0 K/UL (ref 0–0.12)
BUN SERPL-MCNC: 22 MG/DL (ref 8–22)
CALCIUM SERPL-MCNC: 8.4 MG/DL (ref 8.5–10.5)
CHLORIDE SERPL-SCNC: 87 MMOL/L (ref 96–112)
CO2 SERPL-SCNC: 35 MMOL/L (ref 20–33)
CREAT SERPL-MCNC: 0.72 MG/DL (ref 0.5–1.4)
EOSINOPHIL # BLD AUTO: 0.23 K/UL (ref 0–0.51)
EOSINOPHIL NFR BLD: 1.7 % (ref 0–6.9)
ERYTHROCYTE [DISTWIDTH] IN BLOOD BY AUTOMATED COUNT: 59.3 FL (ref 35.9–50)
GLUCOSE BLD-MCNC: 151 MG/DL (ref 65–99)
GLUCOSE BLD-MCNC: 162 MG/DL (ref 65–99)
GLUCOSE BLD-MCNC: 165 MG/DL (ref 65–99)
GLUCOSE BLD-MCNC: 177 MG/DL (ref 65–99)
GLUCOSE SERPL-MCNC: 118 MG/DL (ref 65–99)
HCT VFR BLD AUTO: 43.7 % (ref 42–52)
HGB BLD-MCNC: 13.9 G/DL (ref 14–18)
LYMPHOCYTES # BLD AUTO: 0.48 K/UL (ref 1–4.8)
LYMPHOCYTES NFR BLD: 3.5 % (ref 22–41)
MACROCYTES BLD QL SMEAR: ABNORMAL
MAGNESIUM SERPL-MCNC: 2.1 MG/DL (ref 1.5–2.5)
MANUAL DIFF BLD: NORMAL
MCH RBC QN AUTO: 29.3 PG (ref 27–33)
MCHC RBC AUTO-ENTMCNC: 31.8 G/DL (ref 33.7–35.3)
MCV RBC AUTO: 92 FL (ref 81.4–97.8)
METAMYELOCYTES NFR BLD MANUAL: 0.9 %
MONOCYTES # BLD AUTO: 0.84 K/UL (ref 0–0.85)
MONOCYTES NFR BLD AUTO: 6.1 % (ref 0–13.4)
MORPHOLOGY BLD-IMP: NORMAL
NEUTROPHILS # BLD AUTO: 12.12 K/UL (ref 1.82–7.42)
NEUTROPHILS NFR BLD: 85.2 % (ref 44–72)
NEUTS BAND NFR BLD MANUAL: 2.6 % (ref 0–10)
NRBC # BLD AUTO: 0 K/UL
NRBC BLD-RTO: 0 /100 WBC
PLATELET # BLD AUTO: 177 K/UL (ref 164–446)
PLATELET BLD QL SMEAR: NORMAL
PMV BLD AUTO: 9 FL (ref 9–12.9)
POTASSIUM SERPL-SCNC: 4.6 MMOL/L (ref 3.6–5.5)
RBC # BLD AUTO: 4.75 M/UL (ref 4.7–6.1)
RBC BLD AUTO: PRESENT
SODIUM SERPL-SCNC: 128 MMOL/L (ref 135–145)
WBC # BLD AUTO: 13.8 K/UL (ref 4.8–10.8)

## 2020-11-07 PROCEDURE — 770006 HCHG ROOM/CARE - MED/SURG/GYN SEMI*

## 2020-11-07 PROCEDURE — 700102 HCHG RX REV CODE 250 W/ 637 OVERRIDE(OP): Performed by: STUDENT IN AN ORGANIZED HEALTH CARE EDUCATION/TRAINING PROGRAM

## 2020-11-07 PROCEDURE — A9270 NON-COVERED ITEM OR SERVICE: HCPCS | Performed by: PHYSICIAN ASSISTANT

## 2020-11-07 PROCEDURE — 85027 COMPLETE CBC AUTOMATED: CPT

## 2020-11-07 PROCEDURE — 83735 ASSAY OF MAGNESIUM: CPT

## 2020-11-07 PROCEDURE — 99233 SBSQ HOSP IP/OBS HIGH 50: CPT | Performed by: INTERNAL MEDICINE

## 2020-11-07 PROCEDURE — A9270 NON-COVERED ITEM OR SERVICE: HCPCS | Performed by: STUDENT IN AN ORGANIZED HEALTH CARE EDUCATION/TRAINING PROGRAM

## 2020-11-07 PROCEDURE — 80048 BASIC METABOLIC PNL TOTAL CA: CPT

## 2020-11-07 PROCEDURE — A9270 NON-COVERED ITEM OR SERVICE: HCPCS | Performed by: INTERNAL MEDICINE

## 2020-11-07 PROCEDURE — 700102 HCHG RX REV CODE 250 W/ 637 OVERRIDE(OP): Performed by: INTERNAL MEDICINE

## 2020-11-07 PROCEDURE — 94640 AIRWAY INHALATION TREATMENT: CPT

## 2020-11-07 PROCEDURE — 700111 HCHG RX REV CODE 636 W/ 250 OVERRIDE (IP): Performed by: STUDENT IN AN ORGANIZED HEALTH CARE EDUCATION/TRAINING PROGRAM

## 2020-11-07 PROCEDURE — 700102 HCHG RX REV CODE 250 W/ 637 OVERRIDE(OP): Performed by: PHYSICIAN ASSISTANT

## 2020-11-07 PROCEDURE — 99232 SBSQ HOSP IP/OBS MODERATE 35: CPT | Mod: GC | Performed by: HOSPITALIST

## 2020-11-07 PROCEDURE — 700111 HCHG RX REV CODE 636 W/ 250 OVERRIDE (IP): Performed by: INTERNAL MEDICINE

## 2020-11-07 PROCEDURE — 85007 BL SMEAR W/DIFF WBC COUNT: CPT

## 2020-11-07 PROCEDURE — 82962 GLUCOSE BLOOD TEST: CPT | Mod: 91

## 2020-11-07 RX ADMIN — HYDROCODONE BITARTRATE AND ACETAMINOPHEN 1 TABLET: 10; 325 TABLET ORAL at 08:46

## 2020-11-07 RX ADMIN — ALBUTEROL SULFATE 2 PUFF: 90 AEROSOL, METERED RESPIRATORY (INHALATION) at 19:03

## 2020-11-07 RX ADMIN — GLYCOPYRROLATE 1 CAPSULE: 15.6 CAPSULE RESPIRATORY (INHALATION) at 19:54

## 2020-11-07 RX ADMIN — AMIODARONE HYDROCHLORIDE 200 MG: 200 TABLET ORAL at 04:58

## 2020-11-07 RX ADMIN — LINEZOLID 600 MG: 600 TABLET, FILM COATED ORAL at 04:44

## 2020-11-07 RX ADMIN — GABAPENTIN 200 MG: 100 CAPSULE ORAL at 11:14

## 2020-11-07 RX ADMIN — GABAPENTIN 200 MG: 100 CAPSULE ORAL at 04:43

## 2020-11-07 RX ADMIN — ENOXAPARIN SODIUM 150 MG: 150 INJECTION SUBCUTANEOUS at 21:46

## 2020-11-07 RX ADMIN — ALBUTEROL SULFATE 2 PUFF: 90 AEROSOL, METERED RESPIRATORY (INHALATION) at 06:23

## 2020-11-07 RX ADMIN — HYDROMORPHONE HYDROCHLORIDE 1 MG: 1 INJECTION, SOLUTION INTRAMUSCULAR; INTRAVENOUS; SUBCUTANEOUS at 18:57

## 2020-11-07 RX ADMIN — ROSUVASTATIN CALCIUM 40 MG: 20 TABLET, FILM COATED ORAL at 21:43

## 2020-11-07 RX ADMIN — ENOXAPARIN SODIUM 40 MG: 40 INJECTION SUBCUTANEOUS at 04:44

## 2020-11-07 RX ADMIN — GLYCOPYRROLATE 1 CAPSULE: 15.6 CAPSULE RESPIRATORY (INHALATION) at 08:07

## 2020-11-07 RX ADMIN — GABAPENTIN 200 MG: 100 CAPSULE ORAL at 16:47

## 2020-11-07 RX ADMIN — CEFAZOLIN SODIUM 2 G: 2 INJECTION, SOLUTION INTRAVENOUS at 14:29

## 2020-11-07 RX ADMIN — CEFAZOLIN SODIUM 2 G: 2 INJECTION, SOLUTION INTRAVENOUS at 04:51

## 2020-11-07 RX ADMIN — DOCUSATE SODIUM 50 MG AND SENNOSIDES 8.6 MG 2 TABLET: 8.6; 5 TABLET, FILM COATED ORAL at 04:43

## 2020-11-07 RX ADMIN — CEFAZOLIN SODIUM 2 G: 2 INJECTION, SOLUTION INTRAVENOUS at 21:42

## 2020-11-07 RX ADMIN — HYDROCODONE BITARTRATE AND ACETAMINOPHEN 1 TABLET: 10; 325 TABLET ORAL at 01:22

## 2020-11-07 RX ADMIN — HYDROCODONE BITARTRATE AND ACETAMINOPHEN 1 TABLET: 10; 325 TABLET ORAL at 16:48

## 2020-11-07 ASSESSMENT — ENCOUNTER SYMPTOMS
DEPRESSION: 0
NAUSEA: 0
VOMITING: 0
ABDOMINAL PAIN: 0
POLYDIPSIA: 0
BACK PAIN: 0
CHILLS: 0
DIAPHORESIS: 0
ORTHOPNEA: 0
DIARRHEA: 0
CONSTIPATION: 0
TREMORS: 0
MEMORY LOSS: 0
FLANK PAIN: 0
SPUTUM PRODUCTION: 0
WEAKNESS: 0
HALLUCINATIONS: 0
HEARTBURN: 0
DIZZINESS: 0
SHORTNESS OF BREATH: 0
LOSS OF CONSCIOUSNESS: 0
TINGLING: 0
COUGH: 0
HEADACHES: 0
WEIGHT LOSS: 0
FEVER: 0
BRUISES/BLEEDS EASILY: 0
MYALGIAS: 0
HEMOPTYSIS: 0
PALPITATIONS: 0

## 2020-11-07 ASSESSMENT — PAIN DESCRIPTION - PAIN TYPE
TYPE: ACUTE PAIN
TYPE: SURGICAL PAIN
TYPE: ACUTE PAIN

## 2020-11-07 ASSESSMENT — LIFESTYLE VARIABLES: SUBSTANCE_ABUSE: 0

## 2020-11-07 NOTE — WOUND TEAM
[facility]  Inpatient Services  Initial Wound and Skin Care Evaluation    Admission Date: 10/29/2020     Last order of IP CONSULT TO WOUND CARE was found on 11/4/2020 from Hospital Encounter on 10/29/2020     HPI, PMH, SH: Reviewed    Unit where seen by Wound Team: [id number]     WOUND CONSULT/FOLLOW UP RELATED TO:  Follow up to RLE, vac change to LLE     Self Report / Pain Level:  Premed with PO, IV dilaudid, and topical 4% lidocaine. Tolerated well. 6-7/10       OBJECTIVE:  Patient on regular redistribution with waffle overlay. NPWT to [facility] site. RLE dressing in place, elevated with pillows.     WOUND TYPE, LOCATION, CHARACTERISTICS (Pressure Injuries: location, stage, POA or date identified)          Negative Pressure Wound Therapy 11/04/20 Surgical Leg;Knee Left (Active)   Vacuum Serial Number [id number]    NPWT Pump Mode / Pressure Setting Continuous;Ulta;125 mmHg    Dressing Type Medium;Black Foam (Veraflo)    Number of Foam Pieces Used 2    Canister Changed No    Output (mL) 20 mL    NEXT Dressing Change/Treatment Date 11/09/20    VAC VeraFlo Irrigant Normal Saline    VAC VeraFlo Soak Time (mins) 6    VAC VeraFlo Instill Volume (ml) 22    VAC VeraFlo - Therapy Time (hrs) 3    VAC VeraFlo Pressure (mm/Hg) Continuous;125 mmHg            Wound 10/30/20 Full Thickness Wound Leg;Foot Circumferential Right POA (Active)   Wound Image      11/06/20 1130   Site Assessment Brown;Red;Pink;Yellow;Painful;Non-healing    Periwound Assessment Dry;Satellite lesions    Margins Unattached edges;Undefined edges    Closure Secondary intention    Drainage Amount Scant    Drainage Description Serous    Treatments Cleansed;Site care;Tape change    Wound Cleansing Normal Saline Irrigation    Periwound Protectant Viscopaste    Dressing Cleansing/Solutions Not Applicable    Dressing Options Viscopaste;Absorbent Abdominal Pad;Dry Roll Gauze    Dressing Changed New    Dressing Status Clean;Dry;Intact    Dressing  Change/Treatment Frequency Daily, and As Needed    NEXT Dressing Change/Treatment Date 20    Non-staged Wound Description Full thickness    Wound 20 Incision Leg Left Wound Vac (Active)   Wound Image      Site Assessment White;Red;Bleeding;Painful    Periwound Assessment Clean;Dry;Intact    Margins Attached edges    Closure Secondary intention    Drainage Amount Moderate    Drainage Description Sanguineous    Treatments Cleansed;Site care;Topical Lidocaine;Tape change;Silver nitrate    Wound Cleansing Normal Saline Irrigation    Periwound Protectant Drape;Skin Protectant Wipes to Periwound;Paste Ring    Dressing Cleansing/Solutions Normal Saline    Dressing Options Wound Vac;Ace Wrap;Surgiform    Dressing Changed Changed    Dressing Status Clean;Dry;Intact    Dressing Change/Treatment Frequency Monday, Wednesday, Friday, and As Needed    NEXT Dressing Change/Treatment Date 20    NEXT Weekly Photo (Inpatient Only) 20    Non-staged Wound Description Full thickness    Wound Length (cm) 17.8 cm    Wound Width (cm) 16 cm    Wound Surface Area (cm^2) 284.8 cm^2    Exposed Structures Bone;Muscle;Tendon;Vessel       RLE MEASUREMENTS:    DORSAL FOOT: 6.5 X 10 CM  POSTERIOR LOWER LEG (ACHILLES): 10 X 16CM  ANTERIOR/LATERAL/MEDIAL LE X 16CM       Vascular:    ANNELIESE:   ANNELIESE Results, Last 30 Days Us-extremity Artery Lower Bilat    Result Date: 10/30/2020  Narrative Lower Extremity  Arterial Duplex Report  Vascular Laboratory  CONCLUSIONS  1) Bilateral atherosclerosis  2) Right distal SFA 50-75% stenosis.  2) Monophasic waveforms in the left lower extremity  JARRETT WHITE  Exam Date:     10/30/2020 01:09  Room #:     Inpatient  Priority:     Stat  Ht (in):             Wt (lb):  Ordering Physician:        ELIZABETH HILARIO  Referring Physician:       ELIZABETH HILARIO  Sonographer:               Silvia Lassiter RVT,                             [facility]  Study Type:                Complete Bilateral  Technical Quality:          Adequate  Age:    54    Gender:     M  MRN:    [id number]  :    1965      BSA:  Indications:     Ulceration of LE  CPT Codes:       88683  ICD Codes:       707.1  History:         Nonhealing, raw and bleeding wounds bilaterally. Blue                   discoloration of limbs. Severe pain bilaterally.  Limitations:     Pain.                RIGHT  Waveform        Peak Systolic Velocity (cm/s)                  Prox    Prox-Mid  Mid    Mid-Dist  Distal  Triphasic                         107                      CFA  Triphasic       110                                        PFA  Triphasic       96                89      214      79      SFA  Biphasic                          44                       POP  Biphasic                                           49      AT  Not                                                        PT  attained  Not                                                        BRITTON  attained                LEFT  Waveform        Peak Systolic Velocity (cm/s)                  Prox    Prox-Mid  Mid    Mid-Dist  Distal  Monophasic                        137              109     CFA  Biphasic        67                                         PFA  Monophasic      98                125                      SFA                                                             POP                                                             AT                                                             PT                                                             BRITTON  FINDINGS  Right.  There is atherosclerotic plaque seen throughout the limb.  Stenosis of the distal femoral artery. Velocities are consistent with 50-  75% stenosis.  Waveforms at the common femoral, profunda femoral and femoral artery are  triphasic.  Waveforms at the popliteal and distal anterior tibial artery are biphasic.  The remaining vessels were not properly visualized evaluated due to edema,  severe pain in non-healing bleeding  wounds and large body habitus.  Left.  There is atherosclerotic plaque seen throughout the limb.  Waveforms at the common femoral, profunda femoral and femoral artery are  monophasic.  The remaining vessels of the left lower limb were not properly  visualized/evaluated due to edema, severe pain in area of non-healing  bleeding wounds and large body habitus.  Nima Dill MD  (Electronically Signed)  Final Date:      30 October 2020                   03:22      Lab Values:    Lab Results   Component Value Date/Time    WBC 14.9 (H) 11/06/2020 01:36 PM    RBC 5.12 11/06/2020 01:36 PM    HEMOGLOBIN 14.7 11/06/2020 01:36 PM    HEMATOCRIT 46.6 11/06/2020 01:36 PM    CREACTPROT 30.09 (H) 10/29/2020 10:38 PM    SEDRATEWES 0 10/30/2020 04:15 AM    HBA1C 7.8 (H) 10/30/2020 06:53 AM        Culture Results show:  Recent Results (from the past 720 hour(s))   CULTURE WOUND W/ GRAM STAIN    Collection Time: 10/30/20  1:03 AM    Specimen: Abscess; Wound   Result Value Ref Range    Significant Indicator POS (POS)     Source WND     Site Bilateral Lower Extremity     Culture Result Light growth mixed skin eloy. (A)     Gram Stain Result       Moderate Gram positive cocci.  Rare small Gram positive rods.      Culture Result Staphylococcus aureus  Heavy growth   (A)     Culture Result (A)      Beta Hemolytic Streptococcus group A  Heavy growth         Susceptibility    Staphylococcus aureus - JELANI     Azithromycin >4 Resistant mcg/mL     Clindamycin <=0.5 Sensitive mcg/mL     Cefazolin <=8 Sensitive mcg/mL     Ceftaroline <=0.5 Sensitive mcg/mL     Daptomycin <=1 Sensitive mcg/mL     Ampicillin/sulbactam <=8/4 Sensitive mcg/mL     Erythromycin >4 Resistant mcg/mL     Vancomycin 1 Sensitive mcg/mL     Oxacillin 0.5 Sensitive mcg/mL     Penicillin >8 Resistant mcg/mL     Pip/Tazobactam <=4 Sensitive mcg/mL     Trimeth/Sulfa <=0.5/9.5 Sensitive mcg/mL     Tetracycline <=4 Sensitive mcg/mL       INTERVENTIONS BY WOUND TEAM:  Patient and  chart reviewed. In to change RLE dressing and LLE NPWT. In with wound RN's Mandie and Akua, wound tech noman, TAPAN Collins, and LPS KAYLIE at bedside to visualize. Removed RLE dressing first by soaking as removing. Cleansed gently with NS and gauze, pictures and measurements taken. Put patches (2-3 layers thick) of viscopaste fanfolded over open wounded areas. Placed abdominal pads x2, secured loosely with dry roll gauze. Next turned of NPWT to LLE. Removed ACE bandage, soaked foam with NS and topical lidocaine. Carefully removed dressing, no retained foam. Cleansed wound and periwound with NS and gauze, picture and measurement taken. Shaved hairs on periwound, wiped clean. Had davey Lewis come visualize wound, had to silver nitrate bleeding area, agreed to place vac back on and can do 125mmhg suction. Periwound prepped with no-sting skin prep and periwound. Placed surgifoam and 3 layers of adaptic over distal wound where patient bleeding sanguinous drainage and over pulsating vessel. Applied one full spiral of veraflo black foam, secured top half in place first with drape. Then cut wedge from other spiral foam and placed over the remainder of the wound bed and secured in place with drape. Hole cut at proximal end and trak pad was applied. Suction obtained at 125mmhg continuous, no leaks present. Test run with NS completed, see settings above, no leaks present. Updated bedside RN SERGE and orders placed.     Interdisciplinary consultation: Patient, Bedside RN (SERGE), wound rn mandie negron, TAPAN Collins, LPS KAYLIE Medina in to visualize, Davey Lewis in to visualize and assess.    EVALUATION / RATIONALE FOR TREATMENT: patient admitted with sepsis and worsening BLE wounds. Patient RLE with multiple circumferential weeping wounds, full thickness, applied Viscopatch zinc impregnated gauze to encourage re-epithelialization of superficial 100% viable wound bed, and to provide a non-stick wound contact layer. 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 Patient with NPWT in place until patient can go to OR for definitive closure and amputation if needed. Bone, tendon, muscle, vessel, fat, etc all exposed. Will benefit from NPWT with NS VF to maintain moisture to the bone, and assist in management of drainage, and to monitor for improvement or worsening of wound. Bedside RN SERGE updated, bedside nursing to follow orders for RLE daily changes, wound team will change with wound vac changes or if NPWT is discontinued, we will follow RLE once weekly. Continue vac changes MWF until patient goes to OR.        Goals: Steady decrease in wound area and depth weekly.    NURSING PLAN OF CARE ORDERS (X):    Dressing changes: See Dressing Care orders: X  Skin care: See Skin Care orders: X  RN Prevention Protocol:   Rectal tube care: See Rectal Tube Care orders:   Other orders:    RSKIN:   CURRENTLY IN PLACE (X), APPLIED THIS VISIT (A), ORDERED (O):   Q shift Kirit:  X  Q shift pressure point assessments:  X  Pressure redistribution mattress    X        Low Airloss          Bariatric JOHN         Bariatric foam           Heel float boots     Heel Silicone dressing        Float Heels off Bed with Pillows               Barrier wipes         Barrier Cream         Barrier paste          Sacral silicone dressing         Silicone O2 tubing         Anchorfast         Cannula fixation Device (Tender )          Gray Foam Ear protectors     High flow offloading Clip    Elastic head band offloading device                                                      Trach with Optifoam split foam       Z Sb Pillow                             Waffle cushion        Waffle Overlay   X      Rectal tube or BMS    Purwick/Condom Cath          Antifungal tx      Interdry          Reposition q 2 hours    X  TAPs Turning system                 Up to chair        Ambulate      PT/OT        Dietician        Diabetes Education      PO X     TF     TPN     NPO   # days   Other        WOUND TEAM PLAN OF CARE:   Dressing changes by wound team:                   Follow up 3 times weekly: X - RLE               NPWT change 3 times weekly:   X - LLE  Follow up 1-2 times weekly:      Follow up Bi-Monthly:                   Follow up as needed:       Other (explain):     Anticipated discharge plans: TBD, will likely need ongoing wound care.   LTACH:        SNF/Rehab:                  Home Health Care:           Outpatient Wound Center:            Self Care:

## 2020-11-07 NOTE — PROGRESS NOTES
Infectious Disease Progress Note    Author: Carol Barber M.D. Date & Time of service: 2020  1:12 PM    Chief Complaint:  Lower extremity cellulitis and necrotic wounds       Interval History:  54-year-old male with known coronary artery disease, atrial fibrillation on chronic anticoagulation, diabetes, who was admitted on 10/29/2020 due to worsening bilateral lower extremity pain, swelling, erythema and necrotic wounds  10/31 AF WBC 22 increased O2 but states less SOB Remains with tender and swollen BLE left greater than right-sloughing eschars   AF WBC 21.9 transferred to ICU-down to nasal cannula- BLE edema improved somewhat. Denies SE abx-Vascular note-patient declining surgery   AF, O2 10 L, increased, WBC 24.8, dressing change today by nurses, extremely tender to palpation particularly right lower leg.    11/3 AF, O2 4 L NC- improved today, WBC 25.5, has agreed to surgery and plan is for AKA on the left soon and then potential debridement or amputation of right in the future.  Patient continues to have significant pain swelling and redness in both legs particularly on the left.    AF, O2 4 L NC,  WBC 23.6, status post amputation of the left leg.  He is reporting some ongoing pain at the site and continued pain and swelling of his right leg.       Review of Systems:  Review of Systems   Constitutional: Negative for chills and fever.   Respiratory: Negative for cough and shortness of breath.    Gastrointestinal: Negative for abdominal pain, constipation, diarrhea, nausea and vomiting.   Musculoskeletal: Negative for myalgias.       Hemodynamics:  Temp (24hrs), Av.1 °C (96.9 °F), Min:35.8 °C (96.4 °F), Max:36.3 °C (97.4 °F)  Temperature: (Q8 per RN )  Pulse  Av.1  Min: 64  Max: 103   Blood Pressure: 107/66       Physical Exam:  Physical Exam  Constitutional:       Appearance: Normal appearance. He is obese.   Cardiovascular:      Rate and Rhythm: Regular rhythm.      Heart sounds:  Normal heart sounds.   Pulmonary:      Effort: Pulmonary effort is normal.      Breath sounds: Normal breath sounds.   Abdominal:      General: Abdomen is flat. There is no distension.      Palpations: Abdomen is soft.   Musculoskeletal:      Comments: Left leg amputation, bandage.  Reviewed photos.  Right leg with edema and tenderness bandage, reviewed photos, erythema, areas of skin loss and necrosis.   Skin:     General: Skin is warm and dry.   Neurological:      General: No focal deficit present.      Mental Status: He is alert and oriented to person, place, and time.   Psychiatric:         Mood and Affect: Mood normal.         Behavior: Behavior normal.         Meds:    Current Facility-Administered Medications:   •  lidocaine **OR** lidocaine  •  enoxaparin (LOVENOX) injection  •  insulin regular **AND** POC Blood Glucose **AND** NOTIFY MD and PharmD **AND** glucose **AND** dextrose 50%  •  pneumococcal vaccine  •  influenza vaccine quad  •  gabapentin  •  MD Alert...Adult ICU Electrolyte Replacement per Pharmacy  •  rosuvastatin  •  ceFAZolin  •  linezolid  •  HYDROmorphone  •  albuterol  •  amiodarone  •  ipratropium-albuterol  •  glycopyrrolate  •  senna-docusate **AND** polyethylene glycol/lytes **AND** magnesium hydroxide **AND** bisacodyl  •  Respiratory Therapy Consult  •  cloNIDine  •  acetaminophen  •  HYDROcodone/acetaminophen    Labs:  Recent Labs     11/05/20  0340 11/05/20  1005 11/06/20  1336 11/07/20  0455   WBC 23.6*  --  14.9* 13.8*   RBC 5.46  --  5.12 4.75   HEMOGLOBIN 15.9 15.9 14.7 13.9*   HEMATOCRIT 49.6  --  46.6 43.7   MCV 90.8  --  91.0 92.0   MCH 29.1  --  28.7 29.3   RDW 60.1*  --  60.2* 59.3*   PLATELETCT 245  --  244 177   MPV 9.4  --  9.4 9.0   NEUTSPOLYS 79.50*  --  90.00* 85.20*   LYMPHOCYTES 0.80*  --  5.00* 3.50*   MONOCYTES 9.00  --  1.00 6.10   EOSINOPHILS 0.00  --  2.00 1.70   BASOPHILS 0.00  --  0.00 0.00   RBCMORPHOLO Present  --  Present Present     Recent Labs      11/05/20  0340 11/06/20  1336 11/07/20  0455   SODIUM 132* 129* 128*   POTASSIUM 4.4 5.0 4.6   CHLORIDE 93* 89* 87*   CO2 35* 34* 35*   GLUCOSE 185* 160* 118*   BUN 32* 23* 22     Recent Labs     11/05/20  0340 11/06/20  1336 11/07/20  0455   CREATININE 0.73 0.79 0.72       Imaging:  Ct-extremity, Lower W/o Right    Result Date: 10/31/2020  10/31/2020 3:13 PM HISTORY/REASON FOR EXAM:  Lower leg swelling/redness, cellulitis suspected. TECHNIQUE/EXAM DESCRIPTION AND NUMBER OF VIEWS: CT scan of the RIGHT lower extremity without contrast and including reconstructions. Thin-section noncontrast helical images were obtained. Coronal and sagittal reconstructions were generated from the axial images. Up to date radiation dose reduction adjustments have been utilized to meet ALARA standards for radiation dose reduction. COMPARISON: X-ray tibia and fibula 10/29/2020 FINDINGS: There is joint space narrowing with sclerosis and osteophyte formation in the lateral compartment of the knee. No definite bony destruction is identified. There is marked edema in the subcutaneous tissues of the right lower leg with slight thickening of the overlying skin. There are varicosities. There is arterial calcification. No well-defined fluid collection is identified to suggest abscess. There is diffuse muscle atrophy.     1.  No discrete fluid collection is identified to suggest abscess. 2.  Diffuse subcutaneous edema and overlying skin thickening with venous varicosities. 3.  Atherosclerosis. 4.  Diffuse muscle atrophy.    Ct-extremity, Lower W/o Left    Result Date: 10/31/2020  10/31/2020 2:58 PM HISTORY/REASON FOR EXAM:  Lower leg swelling/redness, cellulitis suspected; Bilateral. TECHNIQUE/EXAM DESCRIPTION AND NUMBER OF VIEWS: CT scan of the LEFT lower extremity without contrast and including reconstructions. Thin-section noncontrast helical images were obtained. Coronal and sagittal reconstructions were generated from the axial images. Up to  date radiation dose reduction adjustments have been utilized to meet ALARA standards for radiation dose reduction. COMPARISON: None. FINDINGS: Distal femur appears normal There is osteoarthritis of the left knee joint. Tibia and fibula are intact. There is no soft tissue gas. There is extensive atherosclerotic plaque There is diffuse muscular atrophy There is cutaneous and subcutaneous edema consistent with cellulitis. There are no discrete fluid collection. There is osteoarthritis of the midfoot.     1.  Left leg cellulitis without abscess or soft tissue gas 2.  Small vessel atherosclerotic plaque 3.  osteoarthritis of the left knee and left midfoot 4.  Muscular atrophy    Dx-chest-limited (1 View)    Result Date: 11/1/2020 11/1/2020 4:42 PM HISTORY/REASON FOR EXAM:  CVL placement. Central line placement TECHNIQUE/EXAM DESCRIPTION AND NUMBER OF VIEWS: Single AP view of the chest. COMPARISON:  1 view chest 10/31/2020 FINDINGS: Right internal jugular catheter has been placed. The tip projects appropriately over the superior vena cava. LUNGS: There are pulmonary interstitial and alveolar densities that are consistent with edema. HEART and MEDIASTINUM: enlarged. There has been previous sternotomy. Pleura: There are no pleural effusion or pneumothoraces. Osseous structures: No significant bony abnormality.     1.  Right internal jugular catheter is been placed and appears appropriately located 2.  Moderate pulmonary edema 3.  Enlarged cardiac silhouette    Dx-chest-portable (1 View)    Result Date: 10/31/2020    10/31/2020 6:58 AM HISTORY/REASON FOR EXAM: Pleural Effusion TECHNIQUE/EXAM DESCRIPTION:  Single AP view of the chest. COMPARISON: October 29, 2020 FINDINGS: Overlying cardiac leads are present. Cardiomegaly is observed.  Postsurgical changes of sternotomy are noted. The mediastinal contour appears within normal limits.  The central  pulmonary vasculature appears prominent and indistinct. The lungs appear well  expanded bilaterally.  Diffuse scattered hazy pulmonary parenchymal opacities are seen. No significant pleural effusions are identified. The bony structures appear age-appropriate.     1.  Pulmonary edema and/or infiltrates are identified, which are stable since the prior exam. 2.  Cardiomegaly    Dx-chest-portable (1 View)    Result Date: 10/30/2020    10/29/2020 11:32 PM HISTORY/REASON FOR EXAM: Shortness of Breath TECHNIQUE/EXAM DESCRIPTION:  Single AP view of the chest. COMPARISON: September 10, 2019 FINDINGS: Overlying cardiac leads are present. Cardiomegaly is observed.  Postsurgical changes of sternotomy are noted. The mediastinal contour appears within normal limits.  The central  pulmonary vasculature appears prominent and indistinct. The lungs appear well expanded bilaterally.  Hazy interstitial bilateral pulmonary opacities are seen. No significant pleural effusions are identified. The bony structures appear age-appropriate.     1.  Interstitial pulmonary parenchymal prominence, compatible with interstitial edema and/or infiltrates. 2.  Cardiomegaly    Dx-tibia And Fibula Left    Addendum Date: 10/30/2020    Addendum: Subtle clustered punctate lucencies lateral to the ankle are seen which could represent tiny foci of soft tissue gas. These findings were discussed with the patient's clinician, ELIZABETH HILARIO, on 10/30/2020 12:34 AM.    Result Date: 10/30/2020  10/29/2020 11:32 PM HISTORY/REASON FOR EXAM: Atraumatic Pain/Swelling/Deformity TECHNIQUE/EXAM DESCRIPTION:  AP and lateral views of the LEFT tibia and fibula. COMPARISON:  None FINDINGS: Tricompartmental degenerative changes of the knee are seen. Bony structures and articulations appear within normal limits without visualized fracture, subluxation, or dislocation. Atherosclerotic changes are seen. Soft tissue phleboliths are seen. Soft tissue edema is noted.     1.  No acute traumatic bony injury. 2.  Tricompartmental degenerative changes of the  knee. 3.  Atherosclerosis    Dx-tibia And Fibula Right    Result Date: 10/30/2020  10/29/2020 11:32 PM HISTORY/REASON FOR EXAM: Atraumatic Pain/Swelling/Deformity TECHNIQUE/EXAM DESCRIPTION:  AP and lateral views of the RIGHT tibia and fibula. COMPARISON:  None FINDINGS: Tricompartmental degenerative changes of the knee are seen, otherwise the bony structures and articulations appear within normal limits without visualized fracture, subluxation, or dislocation. Atherosclerotic changes are seen. Scattered soft tissue level associated.     1.  No acute traumatic bony injury. 2.  Tricompartmental degenerative changes of the knee 3.  Atherosclerosis    Us-extremity Artery Lower Bilat    Result Date: 10/30/2020  Lower Extremity  Arterial Duplex Report  Vascular Laboratory  CONCLUSIONS  1) Bilateral atherosclerosis  2) Right distal SFA 50-75% stenosis.  2) Monophasic waveforms in the left lower extremity  JARRETT WHITE  Exam Date:     10/30/2020 01:09  Room #:     Inpatient  Priority:     Stat  Ht (in):             Wt (lb):  Ordering Physician:        ELIZABETH HILARIO  Referring Physician:       ELIZABETH HILARIO  Sonographer:               Silvia Lassiter RVT, RDMS  Study Type:                Complete Bilateral  Technical Quality:         Adequate  Age:    54    Gender:     M  MRN:    [id number]  :    1965      BSA:  Indications:     Ulceration of LE  CPT Codes:       09364  ICD Codes:       707.1  History:         Nonhealing, raw and bleeding wounds bilaterally. Blue                   discoloration of limbs. Severe pain bilaterally.  Limitations:     Pain.                RIGHT  Waveform        Peak Systolic Velocity (cm/s)                  Prox    Prox-Mid  Mid    Mid-Dist  Distal  Triphasic                         107                      CFA  Triphasic       110                                        PFA  Triphasic       96                89      214      79      SFA  Biphasic                           44                       POP  Biphasic                                           49      AT  Not                                                        PT  attained  Not                                                        BRITTON  attained                LEFT  Waveform        Peak Systolic Velocity (cm/s)                  Prox    Prox-Mid  Mid    Mid-Dist  Distal  Monophasic                        137              109     CFA  Biphasic        67                                         PFA  Monophasic      98                125                      SFA                                                             POP                                                             AT                                                             PT                                                             BRITTON  FINDINGS  Right.  There is atherosclerotic plaque seen throughout the limb.  Stenosis of the distal femoral artery. Velocities are consistent with 50-  75% stenosis.  Waveforms at the common femoral, profunda femoral and femoral artery are  triphasic.  Waveforms at the popliteal and distal anterior tibial artery are biphasic.  The remaining vessels were not properly visualized evaluated due to edema,  severe pain in non-healing bleeding wounds and large body habitus.  Left.  There is atherosclerotic plaque seen throughout the limb.  Waveforms at the common femoral, profunda femoral and femoral artery are  monophasic.  The remaining vessels of the left lower limb were not properly  visualized/evaluated due to edema, severe pain in area of non-healing  bleeding wounds and large body habitus.  Nima Dill MD  (Electronically Signed)  Final Date:      30 October 2020                   03:22    Us-extremity Venous Lower Bilat    Result Date: 10/30/2020   Vascular Laboratory  CONCLUSIONS  1) Normal bilateral superficial and deep venous examination of the lower  extremities.  2) Lower extremity edema, limits  diagnostic sensitivity of this exam  JARRETT WHITE  Exam Date:     10/30/2020 00:46  Room #:     Inpatient  Priority:     Stat  Ht (in):             Wt (lb):  Ordering Physician:        ELIZABETH HILARIO  Referring Physician:       [id number] New  Sonographer:               Silvia Lassiter RVT, RDMS  Study Type:                Complete Bilateral  Technical Quality:         Adequate  Age:    54    Gender:     M  MRN:    [id number]  :    1965      BSA:  Indications:     Localized swelling, mass and lump, lower limb, bilateral,                   Edema, unspecified, Ulceration of LE  CPT Codes:       12727  ICD Codes:       R22.43  R60.9  707.1  History:         Swelling of bilateral lower limbs. Edema. Nonhealing, raw and                   bleeding wounds bilaterally.  Limitations:     Edema, large body habitus and severe pain in limbs at touch  PROCEDURES:  Bilateral lower extremity venous duplex imaging.  The following venous structures were evaluated: common femoral, profunda  femoral, proximal greater saphenous, femoral, popliteal , peroneal and  posterior tibial veins.  Serial compression, augmentation maneuvers,  color and spectral Doppler  flow evaluations were performed.  FINDINGS:  Bilateral lower extremities  No superficial or deep venous thrombosis.  Complete color filling and compressibility with normal venous flow dynamics  including spontaneous flow, response to augmentation maneuvers, and  respiratory phasicity.  The peroneal and posterior tibial veins are difficult to assess for  compressibility and flow by color flow due to severe pain in limbs through  the nonhealing bleeding wounds and edema.  Interstitial fluid consistent with edema is observed in the thigh and below  the knee.  Edema reduces the image quality.  Nima Dill MD  (Electronically Signed)  Final Date:      2020                   03:19    Ec-echocardiogram Complete W/o Cont    Result Date:  10/31/2020  Transthoracic Echo Report Echocardiography Laboratory CONCLUSIONS Poor quality echo, consider repeating with contrast Probably normal LVEF Mild aortic stenosis. RVSP estimated at 30-35 mmHg. JARRETT WHITE Exam Date:         10/31/2020                    09:44 Exam Location:     Inpatient Priority:          Routine Ordering Physician:        MORIAH HUI Referring Physician: Sonographer:               Stalin Mtz RDCS Age:    54     Gender:    M MRN:    [id number] :    1965 BSA:    2.58   Ht (in):    77     Wt (lb):    280 Exam Type:     Complete Indications:     Heart Failure, Systolic ICD Codes:       428.2 CPT Codes:       76711 BP:   119    /   67     HR:   73 Technical Quality:       Technically difficult study                          incomplete information is                          obtained MEASUREMENTS  (Male / Female) Normal Values 2D ECHO LVOT Diameter                     2 cm                  DOPPLER AV Peak Velocity                  2.6 m/s               AV Peak Gradient                  27.9 mmHg             AV Mean Gradient                  17.7 mmHg             LVOT Peak Velocity                1.4 m/s               AV Area Cont Eq vti               1.6 cm2               MV Velocity Time Integral         41 cm                 Mitral E Point Velocity           1 m/s                 Mitral E to A Ratio               1.1                   Mitral A Duration                 96.9 ms               MV Pressure Half Time             102 ms                MV Area PHT                       2.2 cm2               MV Deceleration Time              352 ms                TR Peak Velocity                  258 cm/s              PV Peak Velocity                  1.3 m/s               PV Peak Gradient                  6.5 mmHg              PV Mean Gradient                  3.9 mmHg              * Indicates values subject to auto-interpretation LV EF:        % FINDINGS Left Ventricle Normal left  ventricular chamber size. Normal left ventricular wall thickness. Unable to determine diastolic function. Reliable ejection fraction estimate cannot be made due to technical limitation. Right Ventricle Right ventricle not well visualized. Right Atrium Dilated inferior vena cava with inspiratory collapse. Left Atrium Left atrium not well visualized. Mitral Valve Mitral annular calcification. No stenosis or regurgitation seen. Aortic Valve The aortic valve is not well visualized. Mild aortic stenosis. Vmax is 2.5  m/s. Transvalvular gradients are - Peak: 26 mmHg, Mean: 18 mmHg. No aortic insufficiency. Tricuspid Valve Structurally normal tricuspid valve. No stenosis or regurgitation seen. RVSP estimated at 30-35 mmHg Pulmonic Valve Structurally normal pulmonic valve. No pulmonic stenosis. Mild pulmonic insufficiency. Pericardium Normal pericardium without effusion. Aorta Normal aortic root for body surface area. Ascending aorta diameter is 3.2 cm. Quinton Bueno MD (Electronically Signed) Final Date:     2020                 12:02    Ec-echocardiogram Ltd W/ Cont    Result Date: 11/3/2020  Transthoracic Echo Report Echocardiography Laboratory CONCLUSIONS Compared to the images of the prior study done on 10/31/2020, no significant changes are noted. Left ventricular ejection fraction is visually estimated to be 50%. Mild aortic stenosis. Estimated right ventricular systolic pressure  is 40 mmHg. JARRETT WHITE Exam Date:         2020                    09:41 Exam Location:     Inpatient Priority:          Routine Ordering Physician:        QUINTON BUENO                             ([id number]) Referring Physician:       XIMENA De Jesus Sonographer:               Analia Swan [facility] Age:    54     Gender:    M MRN:    [id number] :    1965 BSA:    2.84   Ht (in):    77     Wt (lb):    350 Exam Type:     Limited, Contrast Indications:     Congestive Heart Failure ICD Codes:       428 CPT Codes:        53152, , 09246, 28182 BP:   113    /   74     HR: Technical Quality:       Fair MEASUREMENTS  (Male / Female) Normal Values 2D ECHO Estimated LV Ejection Fraction    50 %                  IVC Diameter                      2.2 cm                DOPPLER AV Peak Velocity                  2.7 m/s               AV Peak Gradient                  30.1 mmHg             AV Mean Gradient                  18.1 mmHg             LVOT Peak Velocity                1.3 m/s               MV Velocity Time Integral         35.8 cm               MV Pressure Half Time             80 ms                 MV Area PHT                       2.8 cm2               TR Peak Velocity                  299 cm/s              * Indicates values subject to auto-interpretation LV EF:  50    % FINDINGS Left Ventricle 3 mL of contrast was administered. Existing IV was used. Contrast was used to enhance visualization of the endocardial border. Left ventricular systolic function is low normal. Left ventricular ejection fraction is visually estimated to be 50%. Right Ventricle Right Atrium Left Atrium Mitral Valve Mild mitral stenosis. Mean transvalvular gradient is 3  mmHg at a heart rate of 78  BPM. Aortic Valve Mild aortic stenosis. Vmax is  2.6 m/s. Transvalvular gradients are - Peak: 29 mmHg, Mean: 17 mmHg. Tricuspid Valve Mild tricuspid regurgitation. Right atrial pressure is estimated to be 3 mmHg. Estimated right ventricular systolic pressure  is 40 mmHg. Pulmonic Valve Pericardium Normal pericardium without effusion. Aorta Jerry Reyes MD (Electronically Signed) Final Date:     03 November 2020                 12:41      Micro:  Results     Procedure Component Value Units Date/Time    Blood Culture [[id number]] Collected: 11/01/20 1302    Order Status: Completed Specimen: Blood Updated: 11/06/20 1700     Significant Indicator NEG     Source BLD     Site Peripheral     Culture Result No growth after 5 days of incubation.    Narrative:       "Left AC    Blood Culture [[id number]] Collected: 11/01/20 1302    Order Status: Completed Specimen: Blood Updated: 11/06/20 1700     Significant Indicator NEG     Source BLD     Site Peripheral     Culture Result No growth after 5 days of incubation.    BLOOD CULTURE x2 [[id number]] Collected: 10/29/20 2336    Order Status: Completed Specimen: Blood from Peripheral Updated: 11/04/20 0300     Significant Indicator NEG     Source BLD     Site PERIPHERAL     Culture Result No growth after 5 days of incubation.    Narrative:      Per Hospital Policy: Only change Specimen Src: to \"Line\" if  specified by physician order.  Right Forearm/Arm    BLOOD CULTURE [[id number]] Collected: 11/01/20 1400    Order Status: Sent Specimen: Blood from Peripheral     BLOOD CULTURE [[id number]] Collected: 11/01/20 1400    Order Status: Sent Specimen: Blood from Peripheral     E-Test [[id number]] Collected: 10/29/20 2338    Order Status: Completed Specimen: Other Updated: 11/01/20 0839     ETEST Sensitivity FINAL    Narrative:      171 tel. [phone] 10/31/2020, 11:38, RB PERF. RESULTS CALLED TO:[id number]  Per Hospital Policy: Only change Specimen Src: to \"Line\" if  specified by physician order.    BLOOD CULTURE x2 [[id number]]  (Abnormal)  (Susceptibility) Collected: 10/29/20 2338    Order Status: Completed Specimen: Blood from Peripheral Updated: 11/01/20 0839     Significant Indicator POS     Source BLD     Site PERIPHERAL     Culture Result Growth detected by Bactec instrument. 10/30/2020  12:52      Beta Hemolytic Streptococcus group A  This isolate is presumed to be clindamycin resistant based on  detection of inducible resistance.  Clindamycin may still  be effective in some patients.      Narrative:      CALL  [place]  171 tel. [phone],  CALLED  171 tel. [phone] 10/31/2020, 11:38, RB PERF. RESULTS CALLED  TO:[phone]  Per Hospital Policy: Only change Specimen Src: to \"Line\" if  specified by physician order.  Right AC    Susceptibility    "    Beta hemolytic streptococcus group a (1)     Antibiotic Interpretation Microscan Method Status    Penicillin Sensitive 0.032 mcg/mL E-TEST Final    Cefotaxime Sensitive 0.023 mcg/mL E-TEST Final                   CULTURE WOUND W/ GRAM STAIN [[id number]]  (Abnormal)  (Susceptibility) Collected: 10/30/20 0103    Order Status: Completed Specimen: Wound from Abscess Updated: 11/01/20 0756     Significant Indicator POS     Source WND     Site Bilateral Lower Extremity     Culture Result Light growth mixed skin eloy.     Gram Stain Result Moderate Gram positive cocci.  Rare small Gram positive rods.       Culture Result Staphylococcus aureus  Heavy growth        Beta Hemolytic Streptococcus group A  Heavy growth      Narrative:      CALL  [place]  171 tel. [phone],  CALLED  171 tel. [phone] 10/31/2020, 11:44, RB PERF. RESULTS CALLED  TO:[id number] RN    Susceptibility     Staphylococcus aureus (1)     Antibiotic Interpretation Microscan Method Status    Azithromycin Resistant >4 mcg/mL JELANI Final    Clindamycin Sensitive <=0.5 mcg/mL JELANI Final    Cefazolin Sensitive <=8 mcg/mL JELANI Final    Ceftaroline Sensitive <=0.5 mcg/mL JELANI Final    Daptomycin Sensitive <=1 mcg/mL JELANI Final    Ampicillin/sulbactam Sensitive <=8/4 mcg/mL JELANI Final    Erythromycin Resistant >4 mcg/mL JELANI Final    Vancomycin Sensitive 1 mcg/mL JELANI Final    Oxacillin Sensitive 0.5 mcg/mL JELANI Final    Penicillin Resistant >8 mcg/mL JELANI Final    Pip/Tazobactam Sensitive <=4 mcg/mL JELANI Final    Trimeth/Sulfa Sensitive <=0.5/9.5 mcg/mL JELANI Final    Tetracycline Sensitive <=4 mcg/mL JELANI Final                         Assessment:  Active Hospital Problems    Diagnosis   • *Cellulitis [L03.90]   • Right-sided heart failure ([facility]) [I50.810]   • Sepsis ([facility]) [A41.9]   • High anion gap metabolic acidosis [E87.2]   • Supratherapeutic INR [R79.1]   • Paroxysmal atrial fibrillation ([facility]) [I48.0]   • LINSEY (acute kidney injury) ([facility]) [N17.9]   • Polycythemia [D75.1]   •  Elevated troponin [R77.8]   • Morbid obesity with BMI of 40.0-44.9, adult ([facility]) [E66.01, Z68.41]   • Heart failure with preserved ejection fraction, borderline, class IV ([facility]) [I50.30]   • Type 2 diabetes mellitus with complication, without long-term current use of insulin ([facility]) [E11.8]   • COPD (chronic obstructive pulmonary disease) ([facility]) [J44.9]   • Hyponatremia [E87.1]   • Elevated LFTs [R79.89]     Interval 24 hours:      AF, O2 4 L NC   Labs reviewed  Micro reviewed    Patient doing well today and in good spirits.  Still has some ongoing pain erythema swelling in the right leg.  Plan is for closure of the left amputation site in the next few days.    Assessment:  Hypoxia, improved  - chest x-ray with edema, no obvious pneumonia  Bacteremia, GAS  -Cultures from 10/29 and 10/30 with group A strep, clindamycin resistant, penicillin sensitive, 0.032, cefotaxime sensitive 0.023  -Cultures on 11/1 with no growth to date     Bilateral lower extremity necrotizing cellulitis and concern for development of necrotizing fasciitis-pain out of proportion on right and appears to be worsening area of edema erythema and pain on the left  -Wound cultures with GAS and MSSA, eliana stain with GPCs and rare GPRs  - Plan is for left side AKA -he has been refusing intervention but has now agreed for surgery  -CT lower extremity right without contrast on 10/31 without abscess or soft tissue gas.  CT lower extremity left with no discrete fluid collection to suggest abscess or noted air in the tissues.  -The patient went to the OR with Dr. Durbin on 11/4 for left guillotine through knee amputation-no cultures obtained  -Right leg still with necrotic areas and skin breakdown.  However, per photos he appears to be improving.   Leukocytosis, ongoing, multifactorial, infection and surgery -much improved  Acute kidney injury-improved again today   Demand ischemia with elevation of his troponins and proBNP  Penicillin allergic, with anaphylaxis  and rash, however this was as a child so unknown if still present - tolerated cefepime, ceftriaxone and cefazolin per notes   Diabetes     Plan:  ---  Continue cefzolin for the group A strep bacteremia and will also cover the MSSA cellulitis.    Complete at least a 2-week antibiotic course due to the bacteremia, tentative end on 11/15/2020 depending on how the right leg is doing. As he is improving so will stop linezolid and monitor.   --- Follow lab  --- Monitor and control blood sugars       ID will follow.

## 2020-11-07 NOTE — PROGRESS NOTES
Daily Progress Note:     Date of Service: 11/7/2020  Primary Team: [facility] Team   Attending: NOAM Talavera M.D.   Senior Resident: Dr. Leyva  Intern: Dr. Vargas  Contact:  [phone]    Chief Complaint:   Bilateral Lower Extremity Cellulitis    Subjective:   POD #3 s/p L guillotine through knee amputation. No acute events overnight. Patient denies any new or worsening pains, especially his leg and left Lower Extremity. Patient is now at 2-3L NC.    Interval Events:  Con't Lovenox for A. Fib. Anticoagulation  D/C Linezolid per ID recs  Plan repeat surgery this week with Dr Durbin    Consultants/Specialty:  Cardiology  Vascular surgery  Orthopedic surgery   ID    Review of Systems:   Review of Systems   Constitutional: Negative for chills, diaphoresis, fever and weight loss.   HENT: Negative for ear pain, hearing loss and tinnitus.    Respiratory: Negative for cough, hemoptysis, sputum production and shortness of breath.    Cardiovascular: Positive for leg swelling. Negative for chest pain, palpitations and orthopnea.   Gastrointestinal: Negative for abdominal pain, constipation, diarrhea, heartburn, nausea and vomiting.   Genitourinary: Negative for dysuria, flank pain, hematuria and urgency.   Musculoskeletal: Negative for back pain, joint pain and myalgias.   Skin: Negative for itching and rash.        +Right leg wounds.   Neurological: Negative for dizziness, tingling, tremors, loss of consciousness, weakness and headaches.   Endo/Heme/Allergies: Negative for polydipsia. Does not bruise/bleed easily.   Psychiatric/Behavioral: Negative for depression, hallucinations, memory loss, substance abuse and suicidal ideas.       Objective Data:   Physical Exam:   Vitals:   Temp:  [35.8 °C (96.4 °F)-36.3 °C (97.4 °F)] 36.2 °C (97.2 °F)  Pulse:  [64-99] 99  Resp:  [16-18] 17  BP: (106-109)/(64-68) 109/66  SpO2:  [94 %-98 %] 94 %     Physical Exam  Vitals signs reviewed.   Constitutional:       Appearance: He is  obese.      Comments: Cooperative and pleasant mood. Desires to leave to see his grand kids.   HENT:      Head: Normocephalic and atraumatic.      Nose: Nose normal. No congestion.      Mouth/Throat:      Mouth: Mucous membranes are dry.      Pharynx: Oropharynx is clear. No oropharyngeal exudate.      Comments: Dry tongue  Eyes:      Extraocular Movements: Extraocular movements intact.      Conjunctiva/sclera: Conjunctivae normal.      Pupils: Pupils are equal, round, and reactive to light.   Neck:      Musculoskeletal: Normal range of motion and neck supple.      Comments: No JVD  Cardiovascular:      Rate and Rhythm: Normal rate and regular rhythm.      Pulses: Normal pulses.      Heart sounds: Normal heart sounds.   Pulmonary:      Effort: Pulmonary effort is normal. No respiratory distress.      Breath sounds: Normal breath sounds. No wheezing.      Comments: No crackles or increase work of breathing.  Chest:      Chest wall: No tenderness.   Abdominal:      General: Abdomen is flat. Bowel sounds are normal. There is no distension.      Palpations: Abdomen is soft.      Tenderness: There is no abdominal tenderness.      Hernia: A hernia (right umbilical hernia +, reducible) is present.   Musculoskeletal:         General: Swelling and tenderness present.   Skin:     General: Skin is warm.      Capillary Refill: Capillary refill takes less than 2 seconds.      Findings: Lesion present.      Comments: LLE: s/p through knee amputation, dressing clean and intact. Wound vac in place.   Right lower extremity swelling and redness: chronic changes. No palpable crepitus. Leg wrapped in bandages    Neurological:      Mental Status: He is alert and oriented to person, place, and time. Mental status is at baseline.      Cranial Nerves: No cranial nerve deficit.      Motor: No weakness.   Psychiatric:         Mood and Affect: Mood normal.         Behavior: Behavior normal.         Thought Content: Thought content normal.          Judgment: Judgment normal.           Labs:   Recent Labs     11/05/20  0340 11/05/20  1005 11/06/20  1336 11/07/20  0455   WBC 23.6*  --  14.9* 13.8*   RBC 5.46  --  5.12 4.75   HEMOGLOBIN 15.9 15.9 14.7 13.9*   HEMATOCRIT 49.6  --  46.6 43.7   MCV 90.8  --  91.0 92.0   MCH 29.1  --  28.7 29.3   RDW 60.1*  --  60.2* 59.3*   PLATELETCT 245  --  244 177   MPV 9.4  --  9.4 9.0   NEUTSPOLYS 79.50*  --  90.00* 85.20*   LYMPHOCYTES 0.80*  --  5.00* 3.50*   MONOCYTES 9.00  --  1.00 6.10   EOSINOPHILS 0.00  --  2.00 1.70   BASOPHILS 0.00  --  0.00 0.00   RBCMORPHOLO Present  --  Present Present     Recent Labs     11/05/20  0340 11/06/20  1336 11/07/20  0455   SODIUM 132* 129* 128*   POTASSIUM 4.4 5.0 4.6   CHLORIDE 93* 89* 87*   CO2 35* 34* 35*   GLUCOSE 185* 160* 118*   BUN 32* 23* 22        Imaging:   EC-ECHOCARDIOGRAM LTD W/ CONT   Final Result      DX-CHEST-LIMITED (1 VIEW)   Final Result      1.  Right internal jugular catheter is been placed and appears appropriately located      2.  Moderate pulmonary edema      3.  Enlarged cardiac silhouette      CT-EXTREMITY, LOWER W/O RIGHT   Final Result      1.  No discrete fluid collection is identified to suggest abscess.      2.  Diffuse subcutaneous edema and overlying skin thickening with venous varicosities.      3.  Atherosclerosis.      4.  Diffuse muscle atrophy.      CT-EXTREMITY, LOWER W/O LEFT   Final Result      1.  Left leg cellulitis without abscess or soft tissue gas      2.  Small vessel atherosclerotic plaque      3.  osteoarthritis of the left knee and left midfoot      4.  Muscular atrophy      EC-ECHOCARDIOGRAM COMPLETE W/O CONT   Final Result      DX-CHEST-PORTABLE (1 VIEW)   Final Result         1.  Pulmonary edema and/or infiltrates are identified, which are stable since the prior exam.   2.  Cardiomegaly      US-EXTREMITY ARTERY LOWER BILAT   Final Result      US-EXTREMITY VENOUS LOWER BILAT   Final Result      DX-TIBIA AND FIBULA RIGHT   Final  Result         1.  No acute traumatic bony injury.   2.  Tricompartmental degenerative changes of the knee   3.  Atherosclerosis      DX-TIBIA AND FIBULA LEFT   Final Result   Addendum 1 of 1   Addendum: Subtle clustered punctate lucencies lateral to the ankle are    seen which could represent tiny foci of soft tissue gas.      These findings were discussed with the patient's clinician, ELIZABETH HILARIO,    on 10/30/2020 12:34 AM.      Final      DX-CHEST-PORTABLE (1 VIEW)   Final Result         1.  Interstitial pulmonary parenchymal prominence, compatible with interstitial edema and/or infiltrates.   2.  Cardiomegaly          Problem Representation:     54 y.o. male w/ CAD/CABG 2019, Afib, COPD, DM, and PAD who presented 10/29/2020 with LLE cellulitis, strep sepsis, and severe leg pain. 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    * Cellulitis  Assessment & Plan  -Severe cellulitis of bilateral lower extremities  -D/C Linezolid per ID recs  -ID following, currently on cefazolin  -Vascular surgery, Orthopedics and ID are following  -Pain management - PO Norco and IV dilaudid PRN  -s/p Left through knee amputation on 11/4, POD2. Revision surgery planned for next week  -right LE: c/c changes, debridement deferred  -Con't PT and wound care    Sepsis (HCC)  Assessment & Plan  -Likely from infection in bilateral lower extremities  -? Necrotizing fasciitis but CT negative  -Orthopedic surgery,Vascular surgery,ID - following  -10/29: BC - GAS; repeat BC 11/1: NGTD  -10/30: WC - GAS, MSSA    PLAN:  -s/p Left through knee amputation, POD3. Revision surgery planned for next week  -Con't Lovenox for A. Fib. anticoagulation, can switch to Warfarin pending after surgery  -continue Cefazolin and Linezolid  -Follow blood cultures  -Maintain MAP > 65  -Off pressors      High anion gap metabolic acidosis  Assessment & Plan  -RESOLVED  -Likely from sepsis and poor perfusion    Supratherapeutic INR-  (present on admission)  Assessment & Plan  RESOLVED  -At admission INR 4.46, Likely from sepsis  -Warfarin held on admission  -11/3: INR 1.93  --Started Lovenox, can switch to Warfarin pending PT/INR    Paroxysmal atrial fibrillation (HCC)- (present on admission)  Assessment & Plan  -Controlled on metoprolol, amiodarone  -[id number]: 4 points; HASBLED: 2 points.  -On warfarin with supratherapeutic INR (4.46 10/30/2020); likely from sepsis    PLAN:  -Continue amiodarone  -Con't Lovenox for A. Fib. anticoagulation, can switch to Warfarin pending after surgery  -resume metoprolol when appropriate  -Goal for K>4, Mg>2    LINSEY (acute kidney injury) ([facility])  Assessment & Plan  Resolved  -Likely ATN from sepsis vs cardiorenal  -D/C Lasix  -Monitor UOP, I&Os  -Avoid nephrotoxins    Elevated LFTs  Assessment & Plan  -Likely from sepsis  -Monitor CMP    Polycythemia  Assessment & Plan  -Chronic  -Likely 2/2 chronic hypoxia due to COPD vs SHASHANK  -Daily labs as inpatient  -F/U with PCP for outpatient sleep studies    Elevated troponin- (present on admission)  Assessment & Plan  -with elevated BNP, no acute ischemic changes on EKG  -Demand ischemia, Type 2 MI, in the setting of heart failure and sepsis  -11/2/20: Repeat ECHO - EF 50%, RVSP 40, mild AS    Morbid obesity with BMI of 40.0-44.9, adult ([facility])- (present on admission)  Assessment & Plan  -BMI 44.42  -Encourage mobility  -Weight loss counseling when appropriate    Heart failure with preserved ejection fraction, borderline, class IV ([facility])- (present on admission)  Assessment & Plan  -Continue diuresis, lasix changed to IV lasix 40 mg BID on 11/3/2020  -resume metoprolol when appropriate      Type 2 diabetes mellitus with complication, without long-term current use of insulin ([facility])- (present on admission)  Assessment & Plan  -HbA1C 7.8  -ISS  -Hypoglycemia protocol    COPD (chronic obstructive pulmonary disease) ([facility])  Assessment & Plan  Stable  Continue home  inhalers  Supplemental O2  RT protocol    Hyponatremia  Assessment & Plan  IVC dilated on ECHO  Monitor sodium      Devin Vargas M.D.    Please note that this dictation was created using voice recognition software. I have worked with technical experts from [employer] to optimize the interface.  I have made every reasonable attempt to correct obvious errors, but there may be errors of grammar and possibly content that I did not discover before finalizing the note.

## 2020-11-07 NOTE — PROGRESS NOTES
"Patient seen and examined  Complains of minimal pain  In good spirits  WBC decreasing    /66   Pulse 99   Temp 36.2 °C (97.2 °F) (Temporal)   Resp 17   Ht 1.956 m (6' 5\")   Wt (!) 148.3 kg (326 lb 15.1 oz)   SpO2 94%     Recent Labs     11/05/20  0340 11/05/20  1005 11/06/20  1336 11/07/20  0455   WBC 23.6*  --  14.9* 13.8*   RBC 5.46  --  5.12 4.75   HEMOGLOBIN 15.9 15.9 14.7 13.9*   HEMATOCRIT 49.6  --  46.6 43.7   MCV 90.8  --  91.0 92.0   MCH 29.1  --  28.7 29.3   MCHC 32.1*  --  31.5* 31.8*   RDW 60.1*  --  60.2* 59.3*   PLATELETCT 245  --  244 177   MPV 9.4  --  9.4 9.0       No acute distress  Vac in place  Cellulitis receeeding    POD#3  S/P left through knee amputation    Plan:  DVT Prophylaxis- TEDS/SCDs, lovenox while in hospital  Weight Bearing Status-NWB LLE  PT/OT  Antibiotics: Zyvox  Plan repeat surgery this week with Dr Durbin          "
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
## 2020-11-07 NOTE — PROGRESS NOTES
"   Orthopaedic Progress Note    Interval changes:  Patient doing well   Vac dressing changed without issue   Return to OR next week for amp completion pending resolution of residual cellulitis    ROS - Patient denies any new issues, except per above.       /64   Pulse 70   Temp (!) 35.8 °C (96.4 °F) (Temporal)   Resp 18   Ht 1.956 m (6' 5\")   Wt (!) 148.3 kg (326 lb 15.1 oz)   SpO2 98%       Patient seen and examined  No acute distress  Breathing non labored  RRR  LLE dressing CDI, no contracture noted.       Recent Labs     11/04/20  0535 11/04/20  2335 11/05/20  0340 11/05/20  1005 11/06/20  1336   WBC 21.8*  --  23.6*  --  14.9*   RBC 6.66*  --  5.46  --  5.12   HEMOGLOBIN 19.3* 16.1 15.9 15.9 14.7   HEMATOCRIT 59.9* 50.9 49.6  --  46.6   MCV 89.9  --  90.8  --  91.0   MCH 29.0  --  29.1  --  28.7   MCHC 32.2*  --  32.1*  --  31.5*   RDW 60.2*  --  60.1*  --  60.2*   PLATELETCT 193  --  245  --  244   MPV 9.6  --  9.4  --  9.4       Active Hospital Problems    Diagnosis   • Right-sided heart failure (HCC) [I50.810]     Priority: High   • Sepsis (HCC) [A41.9]     Priority: High   • Cellulitis [L03.90]     Priority: High   • High anion gap metabolic acidosis [E87.2]     Priority: Medium   • Supratherapeutic INR [R79.1]     Priority: Medium   • Paroxysmal atrial fibrillation ([facility]) [I48.0]     Priority: Medium   • LINSEY (acute kidney injury) ([facility]) [N17.9]     Priority: Medium   • Polycythemia [D75.1]     Priority: Low   • Elevated troponin [R77.8]     Priority: Low   • Morbid obesity with BMI of 40.0-44.9, adult ([facility]) [E66.01, Z68.41]     Priority: Low   • Heart failure with preserved ejection fraction, borderline, class IV ([facility]) [I50.30]     Priority: Low   • Type 2 diabetes mellitus with complication, without long-term current use of insulin ([facility]) [E11.8]     Priority: Low   • COPD (chronic obstructive pulmonary disease) ([facility]) [J44.9]     Priority: Low   • Hyponatremia [E87.1]     Priority: Low   • " Elevated LFTs [R79.89]       Assessment/Plan:  Patient doing well  POD#2 S/P Left guillotine through-knee amputation  Wt bearing status - NWB LLE  Wound care/Drains - vac in place   Future Procedures - next week amp completion  Sutures/Staples out- 14-21 days post operatively  PT/OT-initiated  Antibiotics: ancef 2g IV q8, zyvox 600mg po BID  DVT Prophylaxis- TEDS/SCDs/Foot pumps  Ceballos-none  Case Coordination for Discharge Planning - Disposition rehab

## 2020-11-07 NOTE — DOCUMENTATION QUERY
[facility]                                                                       Query Response Note      PATIENT:               JARRETT WHITE  ACCT #:                  [id number]  MRN:                     [id number]  :                      1965  ADMIT DATE:       10/29/2020 9:33 PM  DISCH DATE:          RESPONDING  PROVIDER #:        [id number]           QUERY TEXT:    Sepsis with hypotension requiring pressor support has been documented in the Medical Record.  Can this condition be further clarified?     NOTE:  If an appropriate response is not listed below, please respond with a new note.    The patient's Clinical Indicators include:  Per Procedure Note   -Central Line Placement  -Sepsis with hypotension requiring pressor support     Per Progress Notes   -was transferred to the ICU on 10/31 for worsening cardiac function, dropping sodium, increasing oxygen demands, and hypotension in the setting of sepsis.   -On levophed   -Continue lasix; dose increased per Cardiology recommendations.     Treatment:   ID, Cardiology, & Orthopedic Consultations, transfer to ICU, central line placement, Levophed, IV NS, Maxipime, Flagyl, Vanco, Zyvox, clindamycin, & daptomycin     Risk Factors:   Severe sepsis, necrotizing cellulitis, high anion gap acidosis, acute kidney injury, severe PAD, acute diastolic heart failure, atrial fibrillation, hyponatremia, & DM2  Options provided:   -- Sepsis with hypotension without septic shock   -- Sepsis with septic shock   -- Unable to determine      Query created by: Kinjal Goodwin on 2020 2:43 PM    RESPONSE TEXT:    Sepsis with hypotension without septic shock          Electronically signed by:  TON MARIE DO 2020 8:51 AM

## 2020-11-08 LAB
ANION GAP SERPL CALC-SCNC: 3 MMOL/L (ref 7–16)
BASOPHILS # BLD AUTO: 0.6 % (ref 0–1.8)
BASOPHILS # BLD: 0.08 K/UL (ref 0–0.12)
BUN SERPL-MCNC: 19 MG/DL (ref 8–22)
CALCIUM SERPL-MCNC: 8.4 MG/DL (ref 8.5–10.5)
CHLORIDE SERPL-SCNC: 94 MMOL/L (ref 96–112)
CO2 SERPL-SCNC: 34 MMOL/L (ref 20–33)
CREAT SERPL-MCNC: 0.69 MG/DL (ref 0.5–1.4)
EOSINOPHIL # BLD AUTO: 0.13 K/UL (ref 0–0.51)
EOSINOPHIL NFR BLD: 1 % (ref 0–6.9)
ERYTHROCYTE [DISTWIDTH] IN BLOOD BY AUTOMATED COUNT: 59.3 FL (ref 35.9–50)
GLUCOSE BLD-MCNC: 140 MG/DL (ref 65–99)
GLUCOSE BLD-MCNC: 140 MG/DL (ref 65–99)
GLUCOSE BLD-MCNC: 160 MG/DL (ref 65–99)
GLUCOSE BLD-MCNC: 196 MG/DL (ref 65–99)
GLUCOSE SERPL-MCNC: 140 MG/DL (ref 65–99)
HCT VFR BLD AUTO: 40.8 % (ref 42–52)
HGB BLD-MCNC: 12.8 G/DL (ref 14–18)
IMM GRANULOCYTES # BLD AUTO: 0.47 K/UL (ref 0–0.11)
IMM GRANULOCYTES NFR BLD AUTO: 3.5 % (ref 0–0.9)
INR PPP: 1.41 (ref 0.87–1.13)
LYMPHOCYTES # BLD AUTO: 0.71 K/UL (ref 1–4.8)
LYMPHOCYTES NFR BLD: 5.3 % (ref 22–41)
MAGNESIUM SERPL-MCNC: 2.1 MG/DL (ref 1.5–2.5)
MCH RBC QN AUTO: 29 PG (ref 27–33)
MCHC RBC AUTO-ENTMCNC: 31.4 G/DL (ref 33.7–35.3)
MCV RBC AUTO: 92.3 FL (ref 81.4–97.8)
MONOCYTES # BLD AUTO: 0.71 K/UL (ref 0–0.85)
MONOCYTES NFR BLD AUTO: 5.3 % (ref 0–13.4)
NEUTROPHILS # BLD AUTO: 11.37 K/UL (ref 1.82–7.42)
NEUTROPHILS NFR BLD: 84.3 % (ref 44–72)
NRBC # BLD AUTO: 0 K/UL
NRBC BLD-RTO: 0 /100 WBC
PLATELET # BLD AUTO: 217 K/UL (ref 164–446)
PMV BLD AUTO: 9.5 FL (ref 9–12.9)
POTASSIUM SERPL-SCNC: 4.2 MMOL/L (ref 3.6–5.5)
PROTHROMBIN TIME: 17.7 SEC (ref 12–14.6)
RBC # BLD AUTO: 4.42 M/UL (ref 4.7–6.1)
SODIUM SERPL-SCNC: 131 MMOL/L (ref 135–145)
WBC # BLD AUTO: 13.5 K/UL (ref 4.8–10.8)

## 2020-11-08 PROCEDURE — A9270 NON-COVERED ITEM OR SERVICE: HCPCS | Performed by: PHYSICIAN ASSISTANT

## 2020-11-08 PROCEDURE — 700102 HCHG RX REV CODE 250 W/ 637 OVERRIDE(OP): Performed by: STUDENT IN AN ORGANIZED HEALTH CARE EDUCATION/TRAINING PROGRAM

## 2020-11-08 PROCEDURE — 83735 ASSAY OF MAGNESIUM: CPT

## 2020-11-08 PROCEDURE — 700111 HCHG RX REV CODE 636 W/ 250 OVERRIDE (IP): Performed by: INTERNAL MEDICINE

## 2020-11-08 PROCEDURE — 700111 HCHG RX REV CODE 636 W/ 250 OVERRIDE (IP): Performed by: STUDENT IN AN ORGANIZED HEALTH CARE EDUCATION/TRAINING PROGRAM

## 2020-11-08 PROCEDURE — A9270 NON-COVERED ITEM OR SERVICE: HCPCS | Performed by: STUDENT IN AN ORGANIZED HEALTH CARE EDUCATION/TRAINING PROGRAM

## 2020-11-08 PROCEDURE — 700102 HCHG RX REV CODE 250 W/ 637 OVERRIDE(OP): Performed by: PHYSICIAN ASSISTANT

## 2020-11-08 PROCEDURE — 80048 BASIC METABOLIC PNL TOTAL CA: CPT

## 2020-11-08 PROCEDURE — 82962 GLUCOSE BLOOD TEST: CPT | Mod: 91

## 2020-11-08 PROCEDURE — 85025 COMPLETE CBC W/AUTO DIFF WBC: CPT

## 2020-11-08 PROCEDURE — 770006 HCHG ROOM/CARE - MED/SURG/GYN SEMI*

## 2020-11-08 PROCEDURE — 85610 PROTHROMBIN TIME: CPT

## 2020-11-08 PROCEDURE — 700102 HCHG RX REV CODE 250 W/ 637 OVERRIDE(OP): Performed by: INTERNAL MEDICINE

## 2020-11-08 PROCEDURE — 99232 SBSQ HOSP IP/OBS MODERATE 35: CPT | Mod: GC | Performed by: HOSPITALIST

## 2020-11-08 PROCEDURE — A9270 NON-COVERED ITEM OR SERVICE: HCPCS | Performed by: INTERNAL MEDICINE

## 2020-11-08 RX ADMIN — HYDROCODONE BITARTRATE AND ACETAMINOPHEN 1 TABLET: 10; 325 TABLET ORAL at 05:29

## 2020-11-08 RX ADMIN — CEFAZOLIN SODIUM 2 G: 2 INJECTION, SOLUTION INTRAVENOUS at 15:11

## 2020-11-08 RX ADMIN — HYDROMORPHONE HYDROCHLORIDE 1 MG: 1 INJECTION, SOLUTION INTRAMUSCULAR; INTRAVENOUS; SUBCUTANEOUS at 14:37

## 2020-11-08 RX ADMIN — DOCUSATE SODIUM 50 MG AND SENNOSIDES 8.6 MG 2 TABLET: 8.6; 5 TABLET, FILM COATED ORAL at 17:50

## 2020-11-08 RX ADMIN — GABAPENTIN 200 MG: 100 CAPSULE ORAL at 12:00

## 2020-11-08 RX ADMIN — HYDROMORPHONE HYDROCHLORIDE 1 MG: 1 INJECTION, SOLUTION INTRAMUSCULAR; INTRAVENOUS; SUBCUTANEOUS at 18:41

## 2020-11-08 RX ADMIN — GLYCOPYRROLATE 1 CAPSULE: 15.6 CAPSULE RESPIRATORY (INHALATION) at 09:00

## 2020-11-08 RX ADMIN — ROSUVASTATIN CALCIUM 40 MG: 20 TABLET, FILM COATED ORAL at 19:32

## 2020-11-08 RX ADMIN — AMIODARONE HYDROCHLORIDE 200 MG: 200 TABLET ORAL at 05:29

## 2020-11-08 RX ADMIN — ENOXAPARIN SODIUM 150 MG: 150 INJECTION SUBCUTANEOUS at 17:58

## 2020-11-08 RX ADMIN — CEFAZOLIN SODIUM 2 G: 2 INJECTION, SOLUTION INTRAVENOUS at 05:30

## 2020-11-08 RX ADMIN — CEFAZOLIN SODIUM 2 G: 2 INJECTION, SOLUTION INTRAVENOUS at 21:45

## 2020-11-08 RX ADMIN — ENOXAPARIN SODIUM 150 MG: 150 INJECTION SUBCUTANEOUS at 05:29

## 2020-11-08 RX ADMIN — GABAPENTIN 200 MG: 100 CAPSULE ORAL at 05:29

## 2020-11-08 RX ADMIN — HYDROCODONE BITARTRATE AND ACETAMINOPHEN 1 TABLET: 10; 325 TABLET ORAL at 13:08

## 2020-11-08 RX ADMIN — GABAPENTIN 200 MG: 100 CAPSULE ORAL at 17:50

## 2020-11-08 ASSESSMENT — ENCOUNTER SYMPTOMS
HALLUCINATIONS: 0
MEMORY LOSS: 0
CONSTIPATION: 0
WEIGHT LOSS: 0
ORTHOPNEA: 0
SHORTNESS OF BREATH: 0
FLANK PAIN: 0
NAUSEA: 0
HEADACHES: 0
LOSS OF CONSCIOUSNESS: 0
BRUISES/BLEEDS EASILY: 0
HEMOPTYSIS: 0
WEAKNESS: 0
ABDOMINAL PAIN: 0
DIARRHEA: 0
CHILLS: 0
TINGLING: 0
DIAPHORESIS: 0
DIZZINESS: 0
HEARTBURN: 0
FEVER: 0
DEPRESSION: 0
POLYDIPSIA: 0
VOMITING: 0
MYALGIAS: 0
TREMORS: 0
SPUTUM PRODUCTION: 0
PALPITATIONS: 0
BACK PAIN: 0
COUGH: 0

## 2020-11-08 ASSESSMENT — PAIN DESCRIPTION - PAIN TYPE
TYPE: SURGICAL PAIN
TYPE: ACUTE PAIN

## 2020-11-08 ASSESSMENT — LIFESTYLE VARIABLES: SUBSTANCE_ABUSE: 0

## 2020-11-08 NOTE — CARE PLAN
Problem: Infection  Goal: Will remain free from infection  Outcome: PROGRESSING AS EXPECTED  Afebrile on IV antibiotics as ordered.      Problem: Pain Management  Goal: Pain level will decrease to patient's comfort goal  Outcome: PROGRESSING AS EXPECTED   Pain controlled with prn medication.

## 2020-11-08 NOTE — PROGRESS NOTES
Daily Progress Note:     Date of Service: 11/8/2020  Primary Team: [facility]   Attending: NOAM Talavera M.D.   Senior Resident: Dr. Leyva  Intern: Dr. Vargas  Contact:  [phone]    Chief Complaint:   Bilateral Lower Extremity Cellulitis    Subjective  No acute overnight issues.  POD #4 s/p L through knee amputation.   Patient denies any new or worsening pains   Patient is currently on at 2-3L NC.  Still on therapeutic dose of Lovenox for A-fib anticoagulation, will hold it 24 hours before the next planned surgery.      Consultants/Specialty:  Cardiology  Vascular surgery  Orthopedic surgery   ID    Review of Systems:   Review of Systems   Constitutional: Negative for chills, diaphoresis, fever and weight loss.   HENT: Negative for ear pain, hearing loss and tinnitus.    Respiratory: Negative for cough, hemoptysis, sputum production and shortness of breath.    Cardiovascular: Positive for leg swelling. Negative for chest pain, palpitations and orthopnea.   Gastrointestinal: Negative for abdominal pain, constipation, diarrhea, heartburn, nausea and vomiting.   Genitourinary: Negative for dysuria, flank pain, hematuria and urgency.   Musculoskeletal: Negative for back pain, joint pain and myalgias.   Skin: Negative for itching and rash.        +Right leg wounds.   Neurological: Negative for dizziness, tingling, tremors, loss of consciousness, weakness and headaches.   Endo/Heme/Allergies: Negative for polydipsia. Does not bruise/bleed easily.   Psychiatric/Behavioral: Negative for depression, hallucinations, memory loss, substance abuse and suicidal ideas.       Objective Data:   Physical Exam:   Vitals:   Temp:  [35.8 °C (96.4 °F)-36.6 °C (97.9 °F)] 36.6 °C (97.9 °F)  Pulse:  [71-73] 73  Resp:  [17-19] 18  BP: (109-117)/(55-72) 110/55  SpO2:  [95 %-97 %] 96 %     Physical Exam  Vitals signs reviewed.   Constitutional:       Appearance: He is obese.      Comments: Cooperative and pleasant mood. Desires  to leave to see his grand kids.   HENT:      Head: Normocephalic and atraumatic.      Nose: Nose normal. No congestion.      Mouth/Throat:      Mouth: Mucous membranes are dry.      Pharynx: Oropharynx is clear. No oropharyngeal exudate.      Comments: Dry tongue  Eyes:      Extraocular Movements: Extraocular movements intact.      Conjunctiva/sclera: Conjunctivae normal.      Pupils: Pupils are equal, round, and reactive to light.   Neck:      Musculoskeletal: Normal range of motion and neck supple.      Comments: No JVD  Cardiovascular:      Rate and Rhythm: Normal rate and regular rhythm.      Pulses: Normal pulses.      Heart sounds: Normal heart sounds.   Pulmonary:      Effort: Pulmonary effort is normal. No respiratory distress.      Breath sounds: Normal breath sounds. No wheezing.      Comments: No crackles or increase work of breathing.  Chest:      Chest wall: No tenderness.   Abdominal:      General: Abdomen is flat. Bowel sounds are normal. There is no distension.      Palpations: Abdomen is soft.      Tenderness: There is no abdominal tenderness.      Hernia: A hernia (right umbilical hernia +, reducible) is present.   Musculoskeletal:         General: Swelling and tenderness present.      Comments: Wound vac on place.   Skin:     General: Skin is warm.      Capillary Refill: Capillary refill takes less than 2 seconds.      Findings: Lesion present.      Comments: LLE: s/p through knee amputation, dressing clean and intact. Wound vac in place.   Right lower extremity swelling and redness: chronic changes. No palpable crepitus. Leg wrapped in bandages    Neurological:      Mental Status: He is alert and oriented to person, place, and time. Mental status is at baseline.      Cranial Nerves: No cranial nerve deficit.      Motor: No weakness.   Psychiatric:         Mood and Affect: Mood normal.         Behavior: Behavior normal.         Thought Content: Thought content normal.         Judgment: Judgment  normal.           Labs:   Recent Labs     11/06/20  1336 11/07/20  0455 11/08/20  0450   WBC 14.9* 13.8* 13.5*   RBC 5.12 4.75 4.42*   HEMOGLOBIN 14.7 13.9* 12.8*   HEMATOCRIT 46.6 43.7 40.8*   MCV 91.0 92.0 92.3   MCH 28.7 29.3 29.0   RDW 60.2* 59.3* 59.3*   PLATELETCT 244 177 217   MPV 9.4 9.0 9.5   NEUTSPOLYS 90.00* 85.20* 84.30*   LYMPHOCYTES 5.00* 3.50* 5.30*   MONOCYTES 1.00 6.10 5.30   EOSINOPHILS 2.00 1.70 1.00   BASOPHILS 0.00 0.00 0.60   RBCMORPHOLO Present Present  --      Recent Labs     11/06/20  1336 11/07/20  0455 11/08/20  0450   SODIUM 129* 128* 131*   POTASSIUM 5.0 4.6 4.2   CHLORIDE 89* 87* 94*   CO2 34* 35* 34*   GLUCOSE 160* 118* 140*   BUN 23* 22 19        Imaging:   EC-ECHOCARDIOGRAM LTD W/ CONT   Final Result      DX-CHEST-LIMITED (1 VIEW)   Final Result      1.  Right internal jugular catheter is been placed and appears appropriately located      2.  Moderate pulmonary edema      3.  Enlarged cardiac silhouette      CT-EXTREMITY, LOWER W/O RIGHT   Final Result      1.  No discrete fluid collection is identified to suggest abscess.      2.  Diffuse subcutaneous edema and overlying skin thickening with venous varicosities.      3.  Atherosclerosis.      4.  Diffuse muscle atrophy.      CT-EXTREMITY, LOWER W/O LEFT   Final Result      1.  Left leg cellulitis without abscess or soft tissue gas      2.  Small vessel atherosclerotic plaque      3.  osteoarthritis of the left knee and left midfoot      4.  Muscular atrophy      EC-ECHOCARDIOGRAM COMPLETE W/O CONT   Final Result      DX-CHEST-PORTABLE (1 VIEW)   Final Result         1.  Pulmonary edema and/or infiltrates are identified, which are stable since the prior exam.   2.  Cardiomegaly      US-EXTREMITY ARTERY LOWER BILAT   Final Result      US-EXTREMITY VENOUS LOWER BILAT   Final Result      DX-TIBIA AND FIBULA RIGHT   Final Result         1.  No acute traumatic bony injury.   2.  Tricompartmental degenerative changes of the knee   3.   Atherosclerosis      DX-TIBIA AND FIBULA LEFT   Final Result   Addendum 1 of 1   Addendum: Subtle clustered punctate lucencies lateral to the ankle are    seen which could represent tiny foci of soft tissue gas.      These findings were discussed with the patient's clinician, ELIZABETH HILARIO,    on 10/30/2020 12:34 AM.      Final      DX-CHEST-PORTABLE (1 VIEW)   Final Result         1.  Interstitial pulmonary parenchymal prominence, compatible with interstitial edema and/or infiltrates.   2.  Cardiomegaly          Problem Representation:     54 y.o. male w/ CAD/CABG 2019, Afib, COPD, DM, and PAD who presented 10/29/2020 with LLE cellulitis, strep sepsis, and severe leg pain. 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    * Cellulitis  Assessment & Plan  -Severe cellulitis of bilateral lower extremities  -D/C Linezolid per ID recs  -ID following, currently on cefazolin  -Vascular surgery, Orthopedics and ID are following  -Pain management - PO Norco and IV dilaudid PRN  -s/p Left through knee amputation on 11/4, POD2. Revision surgery planned for next week  -right LE: c/c changes, debridement deferred  -Con't PT and wound care    Sepsis (HCC)  Assessment & Plan  -Likely from infection in bilateral lower extremities  -? Necrotizing fasciitis but CT negative  -Orthopedic surgery,Vascular surgery,ID - following  -10/29: BC - GAS; repeat BC 11/1: NGTD  -10/30: WC - GAS, MSSA    PLAN:  -s/p Left through knee amputation, POD3. Revision surgery planned for next week  -Con't Lovenox for A. Fib. anticoagulation, can switch to Warfarin pending after surgery  -continue Cefazolin and Linezolid  -Follow blood cultures  -Maintain MAP > 65  -Off pressors      High anion gap metabolic acidosis  Assessment & Plan  -RESOLVED  -Likely from sepsis and poor perfusion    Supratherapeutic INR- (present on admission)  Assessment & Plan  RESOLVED  -At admission INR 4.46, Likely from sepsis  -Warfarin held on  admission  -11/3: INR 1.93  --Started Lovenox, can switch to Warfarin pending PT/INR    Paroxysmal atrial fibrillation (HCC)- (present on admission)  Assessment & Plan  -Controlled on metoprolol, amiodarone  -[id number]: 4 points; HASBLED: 2 points.  -On warfarin with supratherapeutic INR (4.46 10/30/2020); likely from sepsis    PLAN:  -Continue amiodarone  -Con't Lovenox for A. Fib. anticoagulation, can switch to Warfarin pending after surgery  -resume metoprolol when appropriate  -Goal for K>4, Mg>2    LINSEY (acute kidney injury) ([facility])  Assessment & Plan  Resolved  -Likely ATN from sepsis vs cardiorenal  -D/C Lasix  -Monitor UOP, I&Os  -Avoid nephrotoxins    Elevated LFTs  Assessment & Plan  -Likely from sepsis  -Monitor CMP    Polycythemia  Assessment & Plan  -Chronic  -Likely 2/2 chronic hypoxia due to COPD vs SHASHANK  -Daily labs as inpatient  -F/U with PCP for outpatient sleep studies    Elevated troponin- (present on admission)  Assessment & Plan  -with elevated BNP, no acute ischemic changes on EKG  -Demand ischemia, Type 2 MI, in the setting of heart failure and sepsis  -11/2/20: Repeat ECHO - EF 50%, RVSP 40, mild AS    Morbid obesity with BMI of 40.0-44.9, adult ([facility])- (present on admission)  Assessment & Plan  -BMI 44.42  -Encourage mobility  -Weight loss counseling when appropriate    Heart failure with preserved ejection fraction, borderline, class IV ([facility])- (present on admission)  Assessment & Plan  -Continue diuresis, lasix changed to IV lasix 40 mg BID on 11/3/2020  -resume metoprolol when appropriate      Type 2 diabetes mellitus with complication, without long-term current use of insulin ([facility])- (present on admission)  Assessment & Plan  -HbA1C 7.8  -ISS  -Hypoglycemia protocol    COPD (chronic obstructive pulmonary disease) ([facility])  Assessment & Plan  Stable  Continue home inhalers  Supplemental O2  RT protocol    Hyponatremia  Assessment & Plan  IVC dilated on ECHO  Monitor sodium      Devin Vargas,  M.D.    Please note that this dictation was created using voice recognition software. I have worked with technical experts from [facility] to optimize the interface.  I have made every reasonable attempt to correct obvious errors, but there may be errors of grammar and possibly content that I did not discover before finalizing the note.

## 2020-11-08 NOTE — PROGRESS NOTES
Central line dressing change done per [facility] protocol, all lines flush easily with + blood return.  Line is sutured in place already thus no stat lock applied.  Biopatch changed and patient tolerated well.

## 2020-11-08 NOTE — PROGRESS NOTES
Dressing changed to RLE as per orders, patient had significant pain and dilaudid was given before completion and tolerated the rest of the change well.

## 2020-11-08 NOTE — CARE PLAN
Problem: Infection  Goal: Will remain free from infection  Outcome: PROGRESSING AS EXPECTED     Problem: Communication  Goal: The ability to communicate needs accurately and effectively will improve  Outcome: PROGRESSING AS EXPECTED     Problem: Safety  Goal: Will remain free from injury  Outcome: PROGRESSING AS EXPECTED

## 2020-11-08 NOTE — PROGRESS NOTES
"   Orthopaedic Progress Note    Interval changes:  Patient doing well   Vac dressing changed without issue   Return to OR later this week for amp completion      ROS - Patient denies any new issues, except per above.       /55   Pulse 73   Temp 36.6 °C (97.9 °F) (Temporal)   Resp 18   Ht 1.956 m (6' 5\")   Wt (!) 148.3 kg (326 lb 15.1 oz)   SpO2 96%       Patient seen and examined  No acute distress  Breathing non labored  RRR  LLE dressing CDI, no contracture noted.       Recent Labs     11/06/20  1336 11/07/20  0455 11/08/20  0450   WBC 14.9* 13.8* 13.5*   RBC 5.12 4.75 4.42*   HEMOGLOBIN 14.7 13.9* 12.8*   HEMATOCRIT 46.6 43.7 40.8*   MCV 91.0 92.0 92.3   MCH 28.7 29.3 29.0   MCHC 31.5* 31.8* 31.4*   RDW 60.2* 59.3* 59.3*   PLATELETCT 244 177 217   MPV 9.4 9.0 9.5       Active Hospital Problems    Diagnosis   • Right-sided heart failure ([facility]) [I50.810]     Priority: High   • Sepsis ([facility]) [A41.9]     Priority: High   • Cellulitis [L03.90]     Priority: High   • High anion gap metabolic acidosis [E87.2]     Priority: Medium   • Supratherapeutic INR [R79.1]     Priority: Medium   • Paroxysmal atrial fibrillation ([facility]) [I48.0]     Priority: Medium   • LINSEY (acute kidney injury) ([facility]) [N17.9]     Priority: Medium   • Polycythemia [D75.1]     Priority: Low   • Elevated troponin [R77.8]     Priority: Low   • Morbid obesity with BMI of 40.0-44.9, adult ([facility]) [E66.01, Z68.41]     Priority: Low   • Heart failure with preserved ejection fraction, borderline, class IV ([facility]) [I50.30]     Priority: Low   • Type 2 diabetes mellitus with complication, without long-term current use of insulin ([facility]) [E11.8]     Priority: Low   • COPD (chronic obstructive pulmonary disease) ([facility]) [J44.9]     Priority: Low   • Hyponatremia [E87.1]     Priority: Low   • Elevated LFTs [R79.89]       Assessment/Plan:  Patient doing well  POD#4 S/P Left guillotine through-knee amputation  Wt bearing status - NWB LLE  Wound care/Drains - vac in " place   Future Procedures - later this week amp completion  Sutures/Staples out- 14-21 days post operatively  PT/OT-initiated  Antibiotics: ancef 2g IV q8, zyvox 600mg po BID  DVT Prophylaxis- TEDS/SCDs/Foot pumps  Ceballos-none  Case Coordination for Discharge Planning - Disposition rehab

## 2020-11-08 NOTE — PROGRESS NOTES
Problem: Safety  Goal: Will remain free from falls  Outcome: PROGRESSING AS EXPECTED   Safety precautions in place.  Call light and personal items within reach. Bed at lowest position and locked.  Siderails up X 2.  Clutter free environment & adequate lighting. Educated on level of risk and reminded to call for assistance.  Hourly rounding in effect.     Problem: Infection  Goal: Will remain free from infection  Outcome: PROGRESSING AS EXPECTED   Afebrile. Standard precautions in effect.  Hand washing every encounter, and before & after patient care.  Verbalized understanding.     Problem: Knowledge Deficit  Goal: Knowledge of disease process/condition, treatment plan, diagnostic tests, and medications will improve  Outcome: PROGRESSING AS EXPECTED   Discussed plan of care.  Questions answered.  Verbalized understanding.

## 2020-11-08 NOTE — PROGRESS NOTES
Spoke with Dr. Talavera at bedside, request replace urinary catheter for incontinence to protect extensive wounds or for retention, can void trail for now.  Maintain TLC central line.

## 2020-11-09 LAB
BASOPHILS # BLD AUTO: 0.4 % (ref 0–1.8)
BASOPHILS # BLD: 0.05 K/UL (ref 0–0.12)
EOSINOPHIL # BLD AUTO: 0.19 K/UL (ref 0–0.51)
EOSINOPHIL NFR BLD: 1.5 % (ref 0–6.9)
ERYTHROCYTE [DISTWIDTH] IN BLOOD BY AUTOMATED COUNT: 60.9 FL (ref 35.9–50)
GLUCOSE BLD-MCNC: 119 MG/DL (ref 65–99)
GLUCOSE BLD-MCNC: 143 MG/DL (ref 65–99)
GLUCOSE BLD-MCNC: 187 MG/DL (ref 65–99)
GLUCOSE BLD-MCNC: 196 MG/DL (ref 65–99)
HCT VFR BLD AUTO: 37.5 % (ref 42–52)
HGB BLD-MCNC: 11.5 G/DL (ref 14–18)
IMM GRANULOCYTES # BLD AUTO: 0.21 K/UL (ref 0–0.11)
IMM GRANULOCYTES NFR BLD AUTO: 1.6 % (ref 0–0.9)
LYMPHOCYTES # BLD AUTO: 0.67 K/UL (ref 1–4.8)
LYMPHOCYTES NFR BLD: 5.1 % (ref 22–41)
MAGNESIUM SERPL-MCNC: 1.9 MG/DL (ref 1.5–2.5)
MCH RBC QN AUTO: 29.1 PG (ref 27–33)
MCHC RBC AUTO-ENTMCNC: 30.7 G/DL (ref 33.7–35.3)
MCV RBC AUTO: 94.9 FL (ref 81.4–97.8)
MONOCYTES # BLD AUTO: 0.56 K/UL (ref 0–0.85)
MONOCYTES NFR BLD AUTO: 4.3 % (ref 0–13.4)
NEUTROPHILS # BLD AUTO: 11.41 K/UL (ref 1.82–7.42)
NEUTROPHILS NFR BLD: 87.1 % (ref 44–72)
NRBC # BLD AUTO: 0 K/UL
NRBC BLD-RTO: 0 /100 WBC
PLATELET # BLD AUTO: 224 K/UL (ref 164–446)
PMV BLD AUTO: 9.4 FL (ref 9–12.9)
RBC # BLD AUTO: 3.95 M/UL (ref 4.7–6.1)
WBC # BLD AUTO: 13.1 K/UL (ref 4.8–10.8)

## 2020-11-09 PROCEDURE — 700111 HCHG RX REV CODE 636 W/ 250 OVERRIDE (IP): Performed by: INTERNAL MEDICINE

## 2020-11-09 PROCEDURE — 700111 HCHG RX REV CODE 636 W/ 250 OVERRIDE (IP): Performed by: STUDENT IN AN ORGANIZED HEALTH CARE EDUCATION/TRAINING PROGRAM

## 2020-11-09 PROCEDURE — 302098 PASTE RING (FLAT): Performed by: HOSPITALIST

## 2020-11-09 PROCEDURE — A9270 NON-COVERED ITEM OR SERVICE: HCPCS | Performed by: STUDENT IN AN ORGANIZED HEALTH CARE EDUCATION/TRAINING PROGRAM

## 2020-11-09 PROCEDURE — 700102 HCHG RX REV CODE 250 W/ 637 OVERRIDE(OP): Performed by: PHYSICIAN ASSISTANT

## 2020-11-09 PROCEDURE — 700102 HCHG RX REV CODE 250 W/ 637 OVERRIDE(OP): Performed by: STUDENT IN AN ORGANIZED HEALTH CARE EDUCATION/TRAINING PROGRAM

## 2020-11-09 PROCEDURE — 99232 SBSQ HOSP IP/OBS MODERATE 35: CPT | Mod: GC | Performed by: INTERNAL MEDICINE

## 2020-11-09 PROCEDURE — A9270 NON-COVERED ITEM OR SERVICE: HCPCS | Performed by: INTERNAL MEDICINE

## 2020-11-09 PROCEDURE — 83735 ASSAY OF MAGNESIUM: CPT

## 2020-11-09 PROCEDURE — 770006 HCHG ROOM/CARE - MED/SURG/GYN SEMI*

## 2020-11-09 PROCEDURE — 97606 NEG PRS WND THER DME>50 SQCM: CPT

## 2020-11-09 PROCEDURE — 700102 HCHG RX REV CODE 250 W/ 637 OVERRIDE(OP): Performed by: INTERNAL MEDICINE

## 2020-11-09 PROCEDURE — 82962 GLUCOSE BLOOD TEST: CPT | Mod: 91

## 2020-11-09 PROCEDURE — 99232 SBSQ HOSP IP/OBS MODERATE 35: CPT | Performed by: INTERNAL MEDICINE

## 2020-11-09 PROCEDURE — 85025 COMPLETE CBC W/AUTO DIFF WBC: CPT

## 2020-11-09 PROCEDURE — 94640 AIRWAY INHALATION TREATMENT: CPT

## 2020-11-09 PROCEDURE — A9270 NON-COVERED ITEM OR SERVICE: HCPCS | Performed by: PHYSICIAN ASSISTANT

## 2020-11-09 RX ORDER — GABAPENTIN 300 MG/1
300 CAPSULE ORAL 3 TIMES DAILY
Status: DISCONTINUED | OUTPATIENT
Start: 2020-11-09 | End: 2020-11-16 | Stop reason: HOSPADM

## 2020-11-09 RX ADMIN — HYDROCODONE BITARTRATE AND ACETAMINOPHEN 1 TABLET: 10; 325 TABLET ORAL at 18:07

## 2020-11-09 RX ADMIN — HYDROCODONE BITARTRATE AND ACETAMINOPHEN 1 TABLET: 10; 325 TABLET ORAL at 09:05

## 2020-11-09 RX ADMIN — Medication 400 MG: at 12:16

## 2020-11-09 RX ADMIN — HYDROMORPHONE HYDROCHLORIDE 1 MG: 1 INJECTION, SOLUTION INTRAMUSCULAR; INTRAVENOUS; SUBCUTANEOUS at 08:49

## 2020-11-09 RX ADMIN — CEFAZOLIN SODIUM 2 G: 2 INJECTION, SOLUTION INTRAVENOUS at 05:07

## 2020-11-09 RX ADMIN — AMIODARONE HYDROCHLORIDE 200 MG: 200 TABLET ORAL at 09:06

## 2020-11-09 RX ADMIN — GABAPENTIN 200 MG: 100 CAPSULE ORAL at 05:07

## 2020-11-09 RX ADMIN — HYDROCODONE BITARTRATE AND ACETAMINOPHEN 1 TABLET: 10; 325 TABLET ORAL at 03:07

## 2020-11-09 RX ADMIN — GABAPENTIN 300 MG: 300 CAPSULE ORAL at 18:02

## 2020-11-09 RX ADMIN — GLYCOPYRROLATE 1 CAPSULE: 15.6 CAPSULE RESPIRATORY (INHALATION) at 20:26

## 2020-11-09 RX ADMIN — GABAPENTIN 300 MG: 300 CAPSULE ORAL at 12:17

## 2020-11-09 RX ADMIN — ENOXAPARIN SODIUM 150 MG: 150 INJECTION SUBCUTANEOUS at 18:04

## 2020-11-09 RX ADMIN — ALBUTEROL SULFATE 2 PUFF: 90 AEROSOL, METERED RESPIRATORY (INHALATION) at 18:21

## 2020-11-09 RX ADMIN — ROSUVASTATIN CALCIUM 40 MG: 20 TABLET, FILM COATED ORAL at 20:26

## 2020-11-09 RX ADMIN — DOCUSATE SODIUM 50 MG AND SENNOSIDES 8.6 MG 2 TABLET: 8.6; 5 TABLET, FILM COATED ORAL at 05:07

## 2020-11-09 RX ADMIN — CEFAZOLIN SODIUM 2 G: 2 INJECTION, SOLUTION INTRAVENOUS at 21:22

## 2020-11-09 RX ADMIN — GLYCOPYRROLATE 1 CAPSULE: 15.6 CAPSULE RESPIRATORY (INHALATION) at 08:06

## 2020-11-09 RX ADMIN — CEFAZOLIN SODIUM 2 G: 2 INJECTION, SOLUTION INTRAVENOUS at 14:14

## 2020-11-09 RX ADMIN — ENOXAPARIN SODIUM 150 MG: 150 INJECTION SUBCUTANEOUS at 05:08

## 2020-11-09 ASSESSMENT — ENCOUNTER SYMPTOMS
HEADACHES: 0
ORTHOPNEA: 0
SHORTNESS OF BREATH: 0
ABDOMINAL PAIN: 0
WEAKNESS: 0
DIARRHEA: 0
VOMITING: 0
TREMORS: 0
HALLUCINATIONS: 0
CONSTIPATION: 0
NAUSEA: 0
BACK PAIN: 0
FLANK PAIN: 0
DIAPHORESIS: 0
DIZZINESS: 0
HEARTBURN: 0
MEMORY LOSS: 0
HEMOPTYSIS: 0
TINGLING: 0
PALPITATIONS: 0
POLYDIPSIA: 0
SPUTUM PRODUCTION: 0
MYALGIAS: 0
WEIGHT LOSS: 0
LOSS OF CONSCIOUSNESS: 0
CHILLS: 0
FEVER: 0
BRUISES/BLEEDS EASILY: 0
COUGH: 0
DEPRESSION: 0

## 2020-11-09 ASSESSMENT — PAIN DESCRIPTION - PAIN TYPE: TYPE: ACUTE PAIN

## 2020-11-09 ASSESSMENT — LIFESTYLE VARIABLES: SUBSTANCE_ABUSE: 0

## 2020-11-09 NOTE — PROGRESS NOTES
"S:  Seen and examined.  s/p L through knee amputation.  Doing well this morning.  No new complaints, pain controlled.  Return to OR this week for revision to AKA.    O: /67   Pulse 72   Temp 36.3 °C (97.4 °F) (Temporal)   Resp 18   Ht 1.956 m (6' 5\")   Wt (!) 148.3 kg (326 lb 15.1 oz)   SpO2 92% .      Intake/Output Summary (Last 24 hours) at 11/9/2020 1220  Last data filed at 11/9/2020 0905  Gross per 24 hour   Intake 240 ml   Output 880 ml   Net -640 ml   .    Operative/injured extremity examined.  Wound vac to suction.  Decreasing output.    Recent Labs     11/07/20  0455 11/08/20  0450 11/09/20  0345   WBC 13.8* 13.5* 13.1*   RBC 4.75 4.42* 3.95*   HEMOGLOBIN 13.9* 12.8* 11.5*   HEMATOCRIT 43.7 40.8* 37.5*   MCV 92.0 92.3 94.9   MCH 29.3 29.0 29.1   MCHC 31.8* 31.4* 30.7*   RDW 59.3* 59.3* 60.9*   PLATELETCT 177 217 224   MPV 9.0 9.5 9.4       A/P:    s/p L guillotine through knee amputation    Antibiotics: Per ID  Activity: NWB operative extremity.  PT today.  Diet: General  DVT: Mechanical (SCDs) + Pharmacologic (Hold Warfarin, per medicine otherwise)  Dispo:  Return to OR this week for definitive AKA    "

## 2020-11-09 NOTE — THERAPY
11/09/20 1050   Interdisciplinary Plan of Care Collaboration   Collaboration Comments Attempted to see pt for OT tx. Pt declined d/t pain levels and wound care present for vac change. Will try again later as appropraite.

## 2020-11-09 NOTE — THERAPY
"Missed Therapy     Patient Name: Joshua Medrano  Age:  54 y.o., Sex:  male  Medical Record #: [id number]  Today's Date: 11/9/2020    Discussed missed therapy with nsg    Refused, due to pain.  Also emotional, stating that \"my family left me and moved to [place]\".   "

## 2020-11-09 NOTE — WOUND TEAM
[facility]  Inpatient Services  Wound and Skin Care Progress Note    Admission Date: 10/29/2020     Last order of IP CONSULT TO WOUND CARE was found on 11/4/2020 from Hospital Encounter on 10/29/2020     HPI, PMH, SH: Reviewed    Unit where seen by Wound Team: [id number]     WOUND CONSULT/FOLLOW UP RELATED TO:  Follow up to RLE, vac change to LLE     Self Report / Pain Level:  9/10 with removal of the VAC.  Premed with PO, IV dilaudid, and topical 4% lidocaine. Tolerated well.       OBJECTIVE:  Patient on regular redistribution with waffle overlay. NPWT to [facility] site. RLE dressing in place, elevated with pillows.     WOUND TYPE, LOCATION, CHARACTERISTICS (Pressure Injuries: location, stage, POA or date identified)    Negative Pressure Wound Therapy 11/04/20 Surgical Leg;Knee Left (Active)   Vacuum Serial Number [id number]    NPWT Pump Mode / Pressure Setting Ulta;Continuous;125 mmHg    Dressing Type Medium;Black Foam (Veraflo)    Number of Foam Pieces Used 2    Canister Changed No    Output (mL) 480 mL    NEXT Dressing Change/Treatment Date 11/11/20    VAC VeraFlo Irrigant Normal Saline    VAC VeraFlo Soak Time (mins) 3    VAC VeraFlo Instill Volume (ml) 22    VAC VeraFlo - Therapy Time (hrs) 3    VAC VeraFlo Pressure (mm/Hg) Continuous;125 mmHg            Wound 10/30/20 Full Thickness Wound Leg;Foot Circumferential Right POA (Active)   Wound Image         Site Assessment Eschar;Pink;Red    Periwound Assessment Hemosiderin Staining;Denuded;Excoriated    Margins Defined edges    Closure Secondary intention    Drainage Amount Small    Drainage Description Serosanguineous    Treatments Cleansed;Irrigation;Site care    Wound Cleansing Approved Wound Cleanser    Periwound Protectant Viscopaste    Dressing Cleansing/Solutions Not Applicable    Dressing Options Viscopaste;Absorbent Abdominal Pad;Dry Gauze    Dressing Changed Changed    Dressing Status Clean;Dry;Intact    Dressing Change/Treatment  Frequency Daily, and As Needed    NEXT Dressing Change/Treatment Date 11/10/20    NEXT Weekly Photo (Inpatient Only) 20    Non-staged Wound Description Full thickness    Shape Irregular scattered    Exposed Structures None    Wound 20 Incision Leg Left Wound Vac (Active)   Wound Image      Site Assessment Red;Tan;White    Periwound Assessment Clean;Dry;Intact    Margins Defined edges    Closure Secondary intention    Drainage Amount Moderate    Drainage Description Serosanguineous    Treatments Cleansed;Irrigation;Site care    Wound Cleansing Approved Wound Cleanser    Periwound Protectant Benzoin;Drape;Paste Ring    Dressing Cleansing/Solutions Normal Saline    Dressing Options Wound Vac    Dressing Changed Changed    Dressing Status Clean;Dry;Intact    Dressing Change/Treatment Frequency Monday, Wednesday, Friday, and As Needed    NEXT Dressing Change/Treatment Date 20    NEXT Weekly Photo (Inpatient Only) 20    Non-staged Wound Description Full thickness    Wound Length (cm) 17.8 cm    Wound Width (cm) 16 cm    Wound Surface Area (cm^2) 284.8 cm^2    Shape Circular    Wound Odor None    Exposed Structures Bone;Muscle;Tendon;Vessel;Adipose            Vascular:    ANNELIESE:   ANNELIESE Results, Last 30 Days Us-extremity Artery Lower Bilat    Result Date: 10/30/2020  Narrative Lower Extremity  Arterial Duplex Report  Vascular Laboratory  CONCLUSIONS  1) Bilateral atherosclerosis  2) Right distal SFA 50-75% stenosis.  2) Monophasic waveforms in the left lower extremity  JARRETT WHITE  Exam Date:     10/30/2020 01:09  Room #:     Inpatient  Priority:     Stat  Ht (in):             Wt (lb):  Ordering Physician:        ELIZABETH HILARIO  Referring Physician:       ELIZABETH HILARIO  Sonographer:               Silvia Lassiter RVT,                             [facility]  Study Type:                Complete Bilateral  Technical Quality:         Adequate  Age:    54    Gender:     M  MRN:    [id number]  :    1965       BSA:  Indications:     Ulceration of LE  CPT Codes:       82560  ICD Codes:       707.1  History:         Nonhealing, raw and bleeding wounds bilaterally. Blue                   discoloration of limbs. Severe pain bilaterally.  Limitations:     Pain.                RIGHT  Waveform        Peak Systolic Velocity (cm/s)                  Prox    Prox-Mid  Mid    Mid-Dist  Distal  Triphasic                         107                      CFA  Triphasic       110                                        PFA  Triphasic       96                89      214      79      SFA  Biphasic                          44                       POP  Biphasic                                           49      AT  Not                                                        PT  attained  Not                                                        BRITTON  attained                LEFT  Waveform        Peak Systolic Velocity (cm/s)                  Prox    Prox-Mid  Mid    Mid-Dist  Distal  Monophasic                        137              109     CFA  Biphasic        67                                         PFA  Monophasic      98                125                      SFA                                                             POP                                                             AT                                                             PT                                                             BRITTON  FINDINGS  Right.  There is atherosclerotic plaque seen throughout the limb.  Stenosis of the distal femoral artery. Velocities are consistent with 50-  75% stenosis.  Waveforms at the common femoral, profunda femoral and femoral artery are  triphasic.  Waveforms at the popliteal and distal anterior tibial artery are biphasic.  The remaining vessels were not properly visualized evaluated due to edema,  severe pain in non-healing bleeding wounds and large body habitus.  Left.  There is atherosclerotic plaque seen throughout  the limb.  Waveforms at the common femoral, profunda femoral and femoral artery are  monophasic.  The remaining vessels of the left lower limb were not properly  visualized/evaluated due to edema, severe pain in area of non-healing  bleeding wounds and large body habitus.  Nima Dill MD  (Electronically Signed)  Final Date:      30 October 2020                   03:22      Lab Values:    Lab Results   Component Value Date/Time    WBC 13.1 (H) 11/09/2020 03:45 AM    RBC 3.95 (L) 11/09/2020 03:45 AM    HEMOGLOBIN 11.5 (L) 11/09/2020 03:45 AM    HEMATOCRIT 37.5 (L) 11/09/2020 03:45 AM    CREACTPROT 30.09 (H) 10/29/2020 10:38 PM    SEDRATEWES 0 10/30/2020 04:15 AM    HBA1C 7.8 (H) 10/30/2020 06:53 AM        Culture Results show:  Recent Results (from the past 720 hour(s))   CULTURE WOUND W/ GRAM STAIN    Collection Time: 10/30/20  1:03 AM    Specimen: Abscess; Wound   Result Value Ref Range    Significant Indicator POS (POS)     Source WND     Site Bilateral Lower Extremity     Culture Result Light growth mixed skin eloy. (A)     Gram Stain Result       Moderate Gram positive cocci.  Rare small Gram positive rods.      Culture Result Staphylococcus aureus  Heavy growth   (A)     Culture Result (A)      Beta Hemolytic Streptococcus group A  Heavy growth         Susceptibility    Staphylococcus aureus - JELANI     Azithromycin >4 Resistant mcg/mL     Clindamycin <=0.5 Sensitive mcg/mL     Cefazolin <=8 Sensitive mcg/mL     Ceftaroline <=0.5 Sensitive mcg/mL     Daptomycin <=1 Sensitive mcg/mL     Ampicillin/sulbactam <=8/4 Sensitive mcg/mL     Erythromycin >4 Resistant mcg/mL     Vancomycin 1 Sensitive mcg/mL     Oxacillin 0.5 Sensitive mcg/mL     Penicillin >8 Resistant mcg/mL     Pip/Tazobactam <=4 Sensitive mcg/mL     Trimeth/Sulfa <=0.5/9.5 Sensitive mcg/mL     Tetracycline <=4 Sensitive mcg/mL       INTERVENTIONS BY WOUND TEAM:  Patient and chart reviewed. In to change RLE dressing and LLE NPWT.  Removed RLE  dressing first by soaking as removing. Cleansed gently with NS and gauze. Put patches (2-3 layers thick) of viscopaste fanfolded over open wounded areas. Placed abdominal pads x2, secured loosely with dry roll gauze.     NPWT to LLE: Removed ACE bandage, soaked foam with NS and topical lidocaine. Carefully removed dressing, no retained foam. Cleansed wound and periwound with NS and gauze.  Periwound prepped with no-sting skin prep and periwound. Applied one full spiral of veraflo black foam, secured top half in place first with drape. Then cut wedge from other spiral foam and placed over the remainder of the wound bed and secured in place with drape. Hole cut at proximal end and TRAC pad was applied. Suction obtained at 125mmhg continuous, no leaks present. Test run with NS completed, see settings above, no leaks present.     Interdisciplinary consultation: Patient, Bedside RN (ULISSES)    EVALUATION / RATIONALE FOR TREATMENT: patient admitted with sepsis and worsening BLE wounds. Patient RLE with multiple circumferential weeping wounds, full thickness, applied Viscopatch zinc impregnated gauze to encourage re-epithelialization of superficial 100% viable wound bed, and to provide a non-stick wound contact layer. Patient went to OR 11/04 and had guillotine amputation through the knee of the LLE. Patient with NPWT in place until patient can go to OR for definitive closure and amputation if needed. Bone, tendon, muscle, vessel, fat, etc all exposed. Will benefit from NPWT with NS VF to maintain moisture to the bone, and assist in management of drainage, and to monitor for improvement or worsening of wound. Wound team will change with wound vac changes or if NPWT is discontinued, we will follow RLE once weekly. Continue vac changes MWF until patient goes to OR.        Goals: Steady decrease in wound area and depth weekly.    NURSING PLAN OF CARE ORDERS (X):    Dressing changes: See Dressing Care orders: X  Skin care: See  Skin Care orders: X  RN Prevention Protocol:   Rectal tube care: See Rectal Tube Care orders:   Other orders:    WOUND TEAM PLAN OF CARE:   Dressing changes by wound team:                   Follow up 3 times weekly: X - RLE               NPWT change 3 times weekly:   X - LLE  Follow up 1-2 times weekly:      Follow up Bi-Monthly:                   Follow up as needed:       Other (explain):     Anticipated discharge plans: TBD, will likely need ongoing wound care.   LTACH:        SNF/Rehab: x                 Home Health Care:           Outpatient Wound Center:            Self Care:

## 2020-11-09 NOTE — PROGRESS NOTES
Infectious Disease Progress Note    Author: Enid Braun M.D. Date & Time of service: 2020  8:56 AM    Chief Complaint:  Lower extremity cellulitis and necrotic wounds       Interval History:  54-year-old male with known coronary artery disease, atrial fibrillation on chronic anticoagulation, diabetes, who was admitted on 10/29/2020 due to worsening bilateral lower extremity pain, swelling, erythema and necrotic wounds  10/31 AF WBC 22 increased O2 but states less SOB Remains with tender and swollen BLE left greater than right-sloughing eschars   AF WBC 21.9 transferred to ICU-down to nasal cannula- BLE edema improved somewhat. Denies SE abx-Vascular note-patient declining surgery   AF, O2 10 L, increased, WBC 24.8, dressing change today by nurses, extremely tender to palpation particularly right lower leg.    11/3 AF, O2 4 L NC- improved today, WBC 25.5, has agreed to surgery and plan is for AKA on the left soon and then potential debridement or amputation of right in the future.  Patient continues to have significant pain swelling and redness in both legs particularly on the left.    AF, O2 4 L NC,  WBC 23.6, status post amputation of the left leg.  He is reporting some ongoing pain at the site and continued pain and swelling of his right leg.      afebrile WBC 13.1 right lower extremity examined with wound care nurse.  Overall improved.    Review of Systems:  Review of Systems   Constitutional: Negative for chills and fever.   Respiratory: Negative for cough and shortness of breath.    Gastrointestinal: Negative for abdominal pain, constipation, diarrhea, nausea and vomiting.   Musculoskeletal: Negative for myalgias.       Hemodynamics:  Temp (24hrs), Av.5 °C (97.7 °F), Min:36.3 °C (97.3 °F), Max:36.8 °C (98.2 °F)  Temperature: 36.3 °C (97.4 °F)  Pulse  Av.9  Min: 64  Max: 103   Blood Pressure: 108/67       Physical Exam:  Physical Exam  Constitutional:       Appearance: Normal  appearance. He is obese.   Cardiovascular:      Rate and Rhythm: Regular rhythm.      Heart sounds: Normal heart sounds.   Pulmonary:      Effort: Pulmonary effort is normal.      Breath sounds: Normal breath sounds.   Abdominal:      General: Abdomen is flat. There is no distension.      Palpations: Abdomen is soft.   Musculoskeletal:      Comments: Left left BKA site with wound vacuum in place.    Right lower extremity with areas of skin loss-improving erythema.  Overall improved per wound care nurse   Skin:     General: Skin is warm and dry.   Neurological:      General: No focal deficit present.      Mental Status: He is alert and oriented to person, place, and time.   Psychiatric:         Mood and Affect: Mood normal.         Behavior: Behavior normal.         Meds:    Current Facility-Administered Medications:   •  magnesium oxide  •  enoxaparin (LOVENOX) injection  •  lidocaine **OR** lidocaine  •  insulin regular **AND** POC Blood Glucose **AND** NOTIFY MD and PharmD **AND** glucose **AND** dextrose 50%  •  pneumococcal vaccine  •  influenza vaccine quad  •  gabapentin  •  rosuvastatin  •  ceFAZolin  •  HYDROmorphone  •  albuterol  •  amiodarone  •  ipratropium-albuterol  •  glycopyrrolate  •  senna-docusate **AND** polyethylene glycol/lytes **AND** magnesium hydroxide **AND** bisacodyl  •  Respiratory Therapy Consult  •  cloNIDine  •  acetaminophen  •  HYDROcodone/acetaminophen    Labs:  Recent Labs     11/06/20  1336 11/07/20  0455 11/08/20  0450 11/09/20  0345   WBC 14.9* 13.8* 13.5* 13.1*   RBC 5.12 4.75 4.42* 3.95*   HEMOGLOBIN 14.7 13.9* 12.8* 11.5*   HEMATOCRIT 46.6 43.7 40.8* 37.5*   MCV 91.0 92.0 92.3 94.9   MCH 28.7 29.3 29.0 29.1   RDW 60.2* 59.3* 59.3* 60.9*   PLATELETCT 244 177 217 224   MPV 9.4 9.0 9.5 9.4   NEUTSPOLYS 90.00* 85.20* 84.30* 87.10*   LYMPHOCYTES 5.00* 3.50* 5.30* 5.10*   MONOCYTES 1.00 6.10 5.30 4.30   EOSINOPHILS 2.00 1.70 1.00 1.50   BASOPHILS 0.00 0.00 0.60 0.40   RBCMORPHOLO  Present Present  --   --      Recent Labs     11/06/20  1336 11/07/20  0455 11/08/20  0450   SODIUM 129* 128* 131*   POTASSIUM 5.0 4.6 4.2   CHLORIDE 89* 87* 94*   CO2 34* 35* 34*   GLUCOSE 160* 118* 140*   BUN 23* 22 19     Recent Labs     11/06/20  1336 11/07/20  0455 11/08/20  0450   CREATININE 0.79 0.72 0.69       Imaging:  Ct-extremity, Lower W/o Right    Result Date: 10/31/2020  10/31/2020 3:13 PM HISTORY/REASON FOR EXAM:  Lower leg swelling/redness, cellulitis suspected. TECHNIQUE/EXAM DESCRIPTION AND NUMBER OF VIEWS: CT scan of the RIGHT lower extremity without contrast and including reconstructions. Thin-section noncontrast helical images were obtained. Coronal and sagittal reconstructions were generated from the axial images. Up to date radiation dose reduction adjustments have been utilized to meet ALARA standards for radiation dose reduction. COMPARISON: X-ray tibia and fibula 10/29/2020 FINDINGS: There is joint space narrowing with sclerosis and osteophyte formation in the lateral compartment of the knee. No definite bony destruction is identified. There is marked edema in the subcutaneous tissues of the right lower leg with slight thickening of the overlying skin. There are varicosities. There is arterial calcification. No well-defined fluid collection is identified to suggest abscess. There is diffuse muscle atrophy.     1.  No discrete fluid collection is identified to suggest abscess. 2.  Diffuse subcutaneous edema and overlying skin thickening with venous varicosities. 3.  Atherosclerosis. 4.  Diffuse muscle atrophy.    Ct-extremity, Lower W/o Left    Result Date: 10/31/2020  10/31/2020 2:58 PM HISTORY/REASON FOR EXAM:  Lower leg swelling/redness, cellulitis suspected; Bilateral. TECHNIQUE/EXAM DESCRIPTION AND NUMBER OF VIEWS: CT scan of the LEFT lower extremity without contrast and including reconstructions. Thin-section noncontrast helical images were obtained. Coronal and sagittal  reconstructions were generated from the axial images. Up to date radiation dose reduction adjustments have been utilized to meet ALARA standards for radiation dose reduction. COMPARISON: None. FINDINGS: Distal femur appears normal There is osteoarthritis of the left knee joint. Tibia and fibula are intact. There is no soft tissue gas. There is extensive atherosclerotic plaque There is diffuse muscular atrophy There is cutaneous and subcutaneous edema consistent with cellulitis. There are no discrete fluid collection. There is osteoarthritis of the midfoot.     1.  Left leg cellulitis without abscess or soft tissue gas 2.  Small vessel atherosclerotic plaque 3.  osteoarthritis of the left knee and left midfoot 4.  Muscular atrophy    Dx-chest-limited (1 View)    Result Date: 11/1/2020 11/1/2020 4:42 PM HISTORY/REASON FOR EXAM:  CVL placement. Central line placement TECHNIQUE/EXAM DESCRIPTION AND NUMBER OF VIEWS: Single AP view of the chest. COMPARISON:  1 view chest 10/31/2020 FINDINGS: Right internal jugular catheter has been placed. The tip projects appropriately over the superior vena cava. LUNGS: There are pulmonary interstitial and alveolar densities that are consistent with edema. HEART and MEDIASTINUM: enlarged. There has been previous sternotomy. Pleura: There are no pleural effusion or pneumothoraces. Osseous structures: No significant bony abnormality.     1.  Right internal jugular catheter is been placed and appears appropriately located 2.  Moderate pulmonary edema 3.  Enlarged cardiac silhouette    Dx-chest-portable (1 View)    Result Date: 10/31/2020    10/31/2020 6:58 AM HISTORY/REASON FOR EXAM: Pleural Effusion TECHNIQUE/EXAM DESCRIPTION:  Single AP view of the chest. COMPARISON: October 29, 2020 FINDINGS: Overlying cardiac leads are present. Cardiomegaly is observed.  Postsurgical changes of sternotomy are noted. The mediastinal contour appears within normal limits.  The central  pulmonary  vasculature appears prominent and indistinct. The lungs appear well expanded bilaterally.  Diffuse scattered hazy pulmonary parenchymal opacities are seen. No significant pleural effusions are identified. The bony structures appear age-appropriate.     1.  Pulmonary edema and/or infiltrates are identified, which are stable since the prior exam. 2.  Cardiomegaly    Dx-chest-portable (1 View)    Result Date: 10/30/2020    10/29/2020 11:32 PM HISTORY/REASON FOR EXAM: Shortness of Breath TECHNIQUE/EXAM DESCRIPTION:  Single AP view of the chest. COMPARISON: September 10, 2019 FINDINGS: Overlying cardiac leads are present. Cardiomegaly is observed.  Postsurgical changes of sternotomy are noted. The mediastinal contour appears within normal limits.  The central  pulmonary vasculature appears prominent and indistinct. The lungs appear well expanded bilaterally.  Hazy interstitial bilateral pulmonary opacities are seen. No significant pleural effusions are identified. The bony structures appear age-appropriate.     1.  Interstitial pulmonary parenchymal prominence, compatible with interstitial edema and/or infiltrates. 2.  Cardiomegaly    Dx-tibia And Fibula Left    Addendum Date: 10/30/2020    Addendum: Subtle clustered punctate lucencies lateral to the ankle are seen which could represent tiny foci of soft tissue gas. These findings were discussed with the patient's clinician, ELIZABETH HILARIO, on 10/30/2020 12:34 AM.    Result Date: 10/30/2020  10/29/2020 11:32 PM HISTORY/REASON FOR EXAM: Atraumatic Pain/Swelling/Deformity TECHNIQUE/EXAM DESCRIPTION:  AP and lateral views of the LEFT tibia and fibula. COMPARISON:  None FINDINGS: Tricompartmental degenerative changes of the knee are seen. Bony structures and articulations appear within normal limits without visualized fracture, subluxation, or dislocation. Atherosclerotic changes are seen. Soft tissue phleboliths are seen. Soft tissue edema is noted.     1.  No acute  traumatic bony injury. 2.  Tricompartmental degenerative changes of the knee. 3.  Atherosclerosis    Dx-tibia And Fibula Right    Result Date: 10/30/2020  10/29/2020 11:32 PM HISTORY/REASON FOR EXAM: Atraumatic Pain/Swelling/Deformity TECHNIQUE/EXAM DESCRIPTION:  AP and lateral views of the RIGHT tibia and fibula. COMPARISON:  None FINDINGS: Tricompartmental degenerative changes of the knee are seen, otherwise the bony structures and articulations appear within normal limits without visualized fracture, subluxation, or dislocation. Atherosclerotic changes are seen. Scattered soft tissue level associated.     1.  No acute traumatic bony injury. 2.  Tricompartmental degenerative changes of the knee 3.  Atherosclerosis    Us-extremity Artery Lower Bilat    Result Date: 10/30/2020  Lower Extremity  Arterial Duplex Report  Vascular Laboratory  CONCLUSIONS  1) Bilateral atherosclerosis  2) Right distal SFA 50-75% stenosis.  2) Monophasic waveforms in the left lower extremity  JARRETT WHITE  Exam Date:     10/30/2020 01:09  Room #:     Inpatient  Priority:     Stat  Ht (in):             Wt (lb):  Ordering Physician:        ELIZABETH HILARIO  Referring Physician:       ELIZABETH HILARIO  Sonographer:               Silvia Lassiter RVT,                             [facility]  Study Type:                Complete Bilateral  Technical Quality:         Adequate  Age:    54    Gender:     M  MRN:    [id number]  :    1965      BSA:  Indications:     Ulceration of LE  CPT Codes:       34616  ICD Codes:       707.1  History:         Nonhealing, raw and bleeding wounds bilaterally. Blue                   discoloration of limbs. Severe pain bilaterally.  Limitations:     Pain.                RIGHT  Waveform        Peak Systolic Velocity (cm/s)                  Prox    Prox-Mid  Mid    Mid-Dist  Distal  Triphasic                         107                      CFA  Triphasic       110                                        PFA  Triphasic        96                89      214      79      SFA  Biphasic                          44                       POP  Biphasic                                           49      AT  Not                                                        PT  attained  Not                                                        BRITTON  attained                LEFT  Waveform        Peak Systolic Velocity (cm/s)                  Prox    Prox-Mid  Mid    Mid-Dist  Distal  Monophasic                        137              109     CFA  Biphasic        67                                         PFA  Monophasic      98                125                      SFA                                                             POP                                                             AT                                                             PT                                                             BRITTON  FINDINGS  Right.  There is atherosclerotic plaque seen throughout the limb.  Stenosis of the distal femoral artery. Velocities are consistent with 50-  75% stenosis.  Waveforms at the common femoral, profunda femoral and femoral artery are  triphasic.  Waveforms at the popliteal and distal anterior tibial artery are biphasic.  The remaining vessels were not properly visualized evaluated due to edema,  severe pain in non-healing bleeding wounds and large body habitus.  Left.  There is atherosclerotic plaque seen throughout the limb.  Waveforms at the common femoral, profunda femoral and femoral artery are  monophasic.  The remaining vessels of the left lower limb were not properly  visualized/evaluated due to edema, severe pain in area of non-healing  bleeding wounds and large body habitus.  Nima Dill MD  (Electronically Signed)  Final Date:      30 October 2020                   03:22    Us-extremity Venous Lower Bilat    Result Date: 10/30/2020   Vascular Laboratory  CONCLUSIONS  1) Normal bilateral superficial and deep venous  examination of the lower  extremities.  2) Lower extremity edema, limits diagnostic sensitivity of this exam  JARRETT WHITE  Exam Date:     10/30/2020 00:46  Room #:     Inpatient  Priority:     Stat  Ht (in):             Wt (lb):  Ordering Physician:        ELIZABETH HILARIO  Referring Physician:       [id number] New  Sonographer:               Silvia Lassiter RVT, RDMS  Study Type:                Complete Bilateral  Technical Quality:         Adequate  Age:    54    Gender:     M  MRN:    [id number]  :    1965      BSA:  Indications:     Localized swelling, mass and lump, lower limb, bilateral,                   Edema, unspecified, Ulceration of LE  CPT Codes:       14513  ICD Codes:       R22.43  R60.9  707.1  History:         Swelling of bilateral lower limbs. Edema. Nonhealing, raw and                   bleeding wounds bilaterally.  Limitations:     Edema, large body habitus and severe pain in limbs at touch  PROCEDURES:  Bilateral lower extremity venous duplex imaging.  The following venous structures were evaluated: common femoral, profunda  femoral, proximal greater saphenous, femoral, popliteal , peroneal and  posterior tibial veins.  Serial compression, augmentation maneuvers,  color and spectral Doppler  flow evaluations were performed.  FINDINGS:  Bilateral lower extremities  No superficial or deep venous thrombosis.  Complete color filling and compressibility with normal venous flow dynamics  including spontaneous flow, response to augmentation maneuvers, and  respiratory phasicity.  The peroneal and posterior tibial veins are difficult to assess for  compressibility and flow by color flow due to severe pain in limbs through  the nonhealing bleeding wounds and edema.  Interstitial fluid consistent with edema is observed in the thigh and below  the knee.  Edema reduces the image quality.  Nima Dill MD  (Electronically Signed)  Final Date:      2020                    03:19    Ec-echocardiogram Complete W/o Cont    Result Date: 10/31/2020  Transthoracic Echo Report Echocardiography Laboratory CONCLUSIONS Poor quality echo, consider repeating with contrast Probably normal LVEF Mild aortic stenosis. RVSP estimated at 30-35 mmHg. JARRETT WHITE Exam Date:         10/31/2020                    09:44 Exam Location:     Inpatient Priority:          Routine Ordering Physician:        MORIAH HUI Referring Physician: Sonographer:               Stalin Mtz RDCS Age:    54     Gender:    M MRN:    [id number] :    1965 BSA:    2.58   Ht (in):    77     Wt (lb):    280 Exam Type:     Complete Indications:     Heart Failure, Systolic ICD Codes:       428.2 CPT Codes:       39967 BP:   119    /   67     HR:   73 Technical Quality:       Technically difficult study                          incomplete information is                          obtained MEASUREMENTS  (Male / Female) Normal Values 2D ECHO LVOT Diameter                     2 cm                  DOPPLER AV Peak Velocity                  2.6 m/s               AV Peak Gradient                  27.9 mmHg             AV Mean Gradient                  17.7 mmHg             LVOT Peak Velocity                1.4 m/s               AV Area Cont Eq vti               1.6 cm2               MV Velocity Time Integral         41 cm                 Mitral E Point Velocity           1 m/s                 Mitral E to A Ratio               1.1                   Mitral A Duration                 96.9 ms               MV Pressure Half Time             102 ms                MV Area PHT                       2.2 cm2               MV Deceleration Time              352 ms                TR Peak Velocity                  258 cm/s              PV Peak Velocity                  1.3 m/s               PV Peak Gradient                  6.5 mmHg              PV Mean Gradient                  3.9 mmHg              * Indicates values subject to  auto-interpretation LV EF:        % FINDINGS Left Ventricle Normal left ventricular chamber size. Normal left ventricular wall thickness. Unable to determine diastolic function. Reliable ejection fraction estimate cannot be made due to technical limitation. Right Ventricle Right ventricle not well visualized. Right Atrium Dilated inferior vena cava with inspiratory collapse. Left Atrium Left atrium not well visualized. Mitral Valve Mitral annular calcification. No stenosis or regurgitation seen. Aortic Valve The aortic valve is not well visualized. Mild aortic stenosis. Vmax is 2.5  m/s. Transvalvular gradients are - Peak: 26 mmHg, Mean: 18 mmHg. No aortic insufficiency. Tricuspid Valve Structurally normal tricuspid valve. No stenosis or regurgitation seen. RVSP estimated at 30-35 mmHg Pulmonic Valve Structurally normal pulmonic valve. No pulmonic stenosis. Mild pulmonic insufficiency. Pericardium Normal pericardium without effusion. Aorta Normal aortic root for body surface area. Ascending aorta diameter is 3.2 cm. Quinton Bueno MD (Electronically Signed) Final Date:     2020                 12:02    Ec-echocardiogram Ltd W/ Cont    Result Date: 11/3/2020  Transthoracic Echo Report Echocardiography Laboratory CONCLUSIONS Compared to the images of the prior study done on 10/31/2020, no significant changes are noted. Left ventricular ejection fraction is visually estimated to be 50%. Mild aortic stenosis. Estimated right ventricular systolic pressure  is 40 mmHg. JARRETT WHITE Exam Date:         2020                    09:41 Exam Location:     Inpatient Priority:          Routine Ordering Physician:        QUINTON BUENO                             ([id number]) Referring Physician:       XIMENA De Jesus Sonographer:               Analia Swan [facility] Age:    54     Gender:    M MRN:    [id number] :    1965 BSA:    2.84   Ht (in):    77     Wt (lb):    350 Exam Type:     Limited, Contrast Indications:      Congestive Heart Failure ICD Codes:       428 CPT Codes:       96265, , 14758, 58594 BP:   113    /   74     HR: Technical Quality:       Fair MEASUREMENTS  (Male / Female) Normal Values 2D ECHO Estimated LV Ejection Fraction    50 %                  IVC Diameter                      2.2 cm                DOPPLER AV Peak Velocity                  2.7 m/s               AV Peak Gradient                  30.1 mmHg             AV Mean Gradient                  18.1 mmHg             LVOT Peak Velocity                1.3 m/s               MV Velocity Time Integral         35.8 cm               MV Pressure Half Time             80 ms                 MV Area PHT                       2.8 cm2               TR Peak Velocity                  299 cm/s              * Indicates values subject to auto-interpretation LV EF:  50    % FINDINGS Left Ventricle 3 mL of contrast was administered. Existing IV was used. Contrast was used to enhance visualization of the endocardial border. Left ventricular systolic function is low normal. Left ventricular ejection fraction is visually estimated to be 50%. Right Ventricle Right Atrium Left Atrium Mitral Valve Mild mitral stenosis. Mean transvalvular gradient is 3  mmHg at a heart rate of 78  BPM. Aortic Valve Mild aortic stenosis. Vmax is  2.6 m/s. Transvalvular gradients are - Peak: 29 mmHg, Mean: 17 mmHg. Tricuspid Valve Mild tricuspid regurgitation. Right atrial pressure is estimated to be 3 mmHg. Estimated right ventricular systolic pressure  is 40 mmHg. Pulmonic Valve Pericardium Normal pericardium without effusion. Aorta Jerry Reyes MD (Electronically Signed) Final Date:     03 November 2020                 12:41      Micro:  Results     Procedure Component Value Units Date/Time    Blood Culture [[id number]] Collected: 11/01/20 1302    Order Status: Completed Specimen: Blood Updated: 11/06/20 1700     Significant Indicator NEG     Source BLD     Site Peripheral      "Culture Result No growth after 5 days of incubation.    Narrative:      Left AC    Blood Culture [[id number]] Collected: 11/01/20 1302    Order Status: Completed Specimen: Blood Updated: 11/06/20 1700     Significant Indicator NEG     Source BLD     Site Peripheral     Culture Result No growth after 5 days of incubation.    BLOOD CULTURE x2 [[id number]] Collected: 10/29/20 2336    Order Status: Completed Specimen: Blood from Peripheral Updated: 11/04/20 0300     Significant Indicator NEG     Source BLD     Site PERIPHERAL     Culture Result No growth after 5 days of incubation.    Narrative:      Per Hospital Policy: Only change Specimen Src: to \"Line\" if  specified by physician order.  Right Forearm/Arm          Assessment:  Active Hospital Problems    Diagnosis   • *Cellulitis [L03.90]   • Right-sided heart failure ([facility]) [I50.810]   • Sepsis ([facility]) [A41.9]   • High anion gap metabolic acidosis [E87.2]   • Supratherapeutic INR [R79.1]   • Paroxysmal atrial fibrillation ([facility]) [I48.0]   • LINSEY (acute kidney injury) ([facility]) [N17.9]   • Polycythemia [D75.1]   • Elevated troponin [R77.8]   • Morbid obesity with BMI of 40.0-44.9, adult ([facility]) [E66.01, Z68.41]   • Heart failure with preserved ejection fraction, borderline, class IV ([facility]) [I50.30]   • Type 2 diabetes mellitus with complication, without long-term current use of insulin ([facility]) [E11.8]   • COPD (chronic obstructive pulmonary disease) ([facility]) [J44.9]   • Hyponatremia [E87.1]   • Elevated LFTs [R79.89]     Assessment:  Hypoxia, improved  - chest x-ray with edema, no obvious pneumonia  Bacteremia, GAS  -Cultures from 10/29 and 10/30 with group A strep, clindamycin resistant, penicillin sensitive, 0.032, cefotaxime sensitive 0.023  -Cultures on 11/1 with no growth to date     Bilateral lower extremity necrotizing cellulitis and concern for development of necrotizing fasciitis-pain out of proportion on right and appears to be worsening area of edema erythema and pain on the " left  -Wound cultures with GAS and MSSA, eliana stain with GPCs and rare GPRs  - Plan is for left side AKA -he has been refusing intervention but has now agreed for surgery  -CT lower extremity right without contrast on 10/31 without abscess or soft tissue gas.  CT lower extremity left with no discrete fluid collection to suggest abscess or noted air in the tissues.  -The patient went to the OR with Dr. Durbin on 11/4 for left guillotine through knee amputation-no cultures obtained  -Right leg still with necrotic areas and skin breakdown.  However, per photos he appears to be improving.   Leukocytosis, ongoing, multifactorial, infection and surgery -much improved  Acute kidney injury.  Resolved  Demand ischemia with elevation of his troponins and proBNP  Penicillin allergic, with anaphylaxis and rash, however this was as a child so unknown if still present - tolerated cefepime, ceftriaxone and cefazolin per notes   Diabetes     Plan:  ---  Continue IV cefazolin for the group A strep bacteremia and will also cover the MSSA cellulitis.    Complete at least a 2-week antibiotic course due to the bacteremia, tentative end on 11/15/2020   --- Follow lab  --- Monitor and control blood sugars   --- Continue wound care for right lower extremity   --- Plan for definitive closure per Ortho notes this week    Plan of care discussed with bedside nurse Reema and Tasneem wound care nurse.  Signing off please reconsult if needed

## 2020-11-09 NOTE — PROGRESS NOTES
0105 moderate bleeding from BKA wound vac site, bright red blood with NS  Reinforced wound vac - possible leak, applied new dressing, elevated LLE BKA.   0107  Paged [facility] on call MD  0111 Spoke with Dr Booth, informed of situation, VS, and requested H&H order.  Per Dr Booth pt is scheduled for morning labs, no need to order H&H at this time.  0120 BKA site clean, dry and intact.  0145 BKA site clean, dry and intact.  0230  BKA site clean, dry and intact.

## 2020-11-09 NOTE — PROGRESS NOTES
Daily Progress Note:     Date of Service: 11/9/2020  Primary Team: [facility]   Attending: Km Menendez M.D.   Senior Resident: Dr. Leyva  Intern: Dr. Vargas  Contact:  [phone]    Chief Complaint:   Bilateral Lower Extremity Cellulitis    Subjective  Overnight wound vac had a leak and blood leaked everywhere, was replaced.  POD #5 s/p L through knee amputation.   Patient denies any new or worsening pains  Patient is currently on at 2-3L NC.    Interval Events:  -Surgery planned 11/12/2020  -Started Mag Oxide 400mg daily tabs to keep Mg>2    Consultants/Specialty:  Cardiology  Vascular surgery  Orthopedic surgery   ID    Review of Systems:   Review of Systems   Constitutional: Negative for chills, diaphoresis, fever and weight loss.   HENT: Negative for ear pain, hearing loss and tinnitus.    Respiratory: Negative for cough, hemoptysis, sputum production and shortness of breath.    Cardiovascular: Positive for leg swelling. Negative for chest pain, palpitations and orthopnea.   Gastrointestinal: Negative for abdominal pain, constipation, diarrhea, heartburn, nausea and vomiting.   Genitourinary: Negative for dysuria, flank pain, hematuria and urgency.   Musculoskeletal: Negative for back pain, joint pain and myalgias.   Skin: Negative for itching and rash.        +Right leg wounds.   Neurological: Negative for dizziness, tingling, tremors, loss of consciousness, weakness and headaches.   Endo/Heme/Allergies: Negative for polydipsia. Does not bruise/bleed easily.   Psychiatric/Behavioral: Negative for depression, hallucinations, memory loss, substance abuse and suicidal ideas.       Objective Data:   Physical Exam:   Vitals:   Temp:  [36.3 °C (97.3 °F)-36.8 °C (98.2 °F)] 36.3 °C (97.4 °F)  Pulse:  [70-77] 72  Resp:  [16-18] 18  BP: (100-111)/(63-70) 108/67  SpO2:  [92 %-98 %] 92 %     Physical Exam  Vitals signs reviewed.   Constitutional:       Appearance: He is obese.      Comments: Cooperative and  pleasant mood. Desires to leave to see his grand kids.   HENT:      Head: Normocephalic and atraumatic.      Nose: Nose normal. No congestion.      Mouth/Throat:      Mouth: Mucous membranes are dry.      Pharynx: Oropharynx is clear. No oropharyngeal exudate.      Comments: Dry tongue  Eyes:      Extraocular Movements: Extraocular movements intact.      Conjunctiva/sclera: Conjunctivae normal.      Pupils: Pupils are equal, round, and reactive to light.   Neck:      Musculoskeletal: Normal range of motion and neck supple.      Comments: No JVD  Cardiovascular:      Rate and Rhythm: Normal rate and regular rhythm.      Pulses: Normal pulses.      Heart sounds: Normal heart sounds.   Pulmonary:      Effort: Pulmonary effort is normal. No respiratory distress.      Breath sounds: Normal breath sounds. No wheezing.      Comments: No crackles or increase work of breathing.  Chest:      Chest wall: No tenderness.   Abdominal:      General: Abdomen is flat. Bowel sounds are normal. There is no distension.      Palpations: Abdomen is soft.      Tenderness: There is no abdominal tenderness.      Hernia: A hernia (right umbilical hernia +, reducible) is present.   Musculoskeletal:         General: Swelling and tenderness present.      Comments: Wound vac on place.   Skin:     General: Skin is warm.      Capillary Refill: Capillary refill takes less than 2 seconds.      Findings: Lesion present.      Comments: LLE: s/p through knee amputation, dressing clean and intact. Wound vac in place.   Right lower extremity swelling and redness: chronic changes. No palpable crepitus. Leg wrapped in bandages    Neurological:      Mental Status: He is alert and oriented to person, place, and time. Mental status is at baseline.      Cranial Nerves: No cranial nerve deficit.      Motor: No weakness.   Psychiatric:         Mood and Affect: Mood normal.         Behavior: Behavior normal.         Thought Content: Thought content normal.          Judgment: Judgment normal.           Labs:   Recent Labs     11/07/20 0455 11/08/20 0450 11/09/20  0345   WBC 13.8* 13.5* 13.1*   RBC 4.75 4.42* 3.95*   HEMOGLOBIN 13.9* 12.8* 11.5*   HEMATOCRIT 43.7 40.8* 37.5*   MCV 92.0 92.3 94.9   MCH 29.3 29.0 29.1   RDW 59.3* 59.3* 60.9*   PLATELETCT 177 217 224   MPV 9.0 9.5 9.4   NEUTSPOLYS 85.20* 84.30* 87.10*   LYMPHOCYTES 3.50* 5.30* 5.10*   MONOCYTES 6.10 5.30 4.30   EOSINOPHILS 1.70 1.00 1.50   BASOPHILS 0.00 0.60 0.40   RBCMORPHOLO Present  --   --      Recent Labs     11/07/20 0455 11/08/20 0450   SODIUM 128* 131*   POTASSIUM 4.6 4.2   CHLORIDE 87* 94*   CO2 35* 34*   GLUCOSE 118* 140*   BUN 22 19        Imaging:   EC-ECHOCARDIOGRAM LTD W/ CONT   Final Result      DX-CHEST-LIMITED (1 VIEW)   Final Result      1.  Right internal jugular catheter is been placed and appears appropriately located      2.  Moderate pulmonary edema      3.  Enlarged cardiac silhouette      CT-EXTREMITY, LOWER W/O RIGHT   Final Result      1.  No discrete fluid collection is identified to suggest abscess.      2.  Diffuse subcutaneous edema and overlying skin thickening with venous varicosities.      3.  Atherosclerosis.      4.  Diffuse muscle atrophy.      CT-EXTREMITY, LOWER W/O LEFT   Final Result      1.  Left leg cellulitis without abscess or soft tissue gas      2.  Small vessel atherosclerotic plaque      3.  osteoarthritis of the left knee and left midfoot      4.  Muscular atrophy      EC-ECHOCARDIOGRAM COMPLETE W/O CONT   Final Result      DX-CHEST-PORTABLE (1 VIEW)   Final Result         1.  Pulmonary edema and/or infiltrates are identified, which are stable since the prior exam.   2.  Cardiomegaly      US-EXTREMITY ARTERY LOWER BILAT   Final Result      US-EXTREMITY VENOUS LOWER BILAT   Final Result      DX-TIBIA AND FIBULA RIGHT   Final Result         1.  No acute traumatic bony injury.   2.  Tricompartmental degenerative changes of the knee   3.  Atherosclerosis       DX-TIBIA AND FIBULA LEFT   Final Result   Addendum 1 of 1   Addendum: Subtle clustered punctate lucencies lateral to the ankle are    seen which could represent tiny foci of soft tissue gas.      These findings were discussed with the patient's clinician, ELIZABETH HILARIO,    on 10/30/2020 12:34 AM.      Final      DX-CHEST-PORTABLE (1 VIEW)   Final Result         1.  Interstitial pulmonary parenchymal prominence, compatible with interstitial edema and/or infiltrates.   2.  Cardiomegaly          Problem Representation:     54 y.o. male w/ CAD/CABG 2019, Afib, COPD, DM, and PAD who presented 10/29/2020 with LLE cellulitis, strep sepsis, and severe leg pain. 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    * Cellulitis  Assessment & Plan  Severe cellulitis of bilateral lower extremities    -D/C Linezolid per ID recs  -ID following, currently on cefazolin tentative end of 11/15/2020  -Vascular surgery, Orthopedics and ID are following  -Pain management - PO Norco and IV dilaudid PRN  -s/p Left through knee amputation on 11/4, POD4. Revision surgery planned for 11/10/2020 at roughly 0845  -right LE: c/c changes, debridement deferred  -Con't PT and wound care    Sepsis (HCC)  Assessment & Plan  -Likely from infection in bilateral lower extremities  -Orthopedic surgery,Vascular surgery,ID - following  -10/29: BC - GAS; repeat BC 11/1: NGTD  -10/30: WC - GAS, MSSA    PLAN:  -s/p Left through knee amputation, POD4. Revision surgery planned for 11/12  -Con't Lovenox will hold 24 hours before surgery  -continue Cefazolin, and per ID at least a 2-week antibiotic course due to the bacteremia, tentative end on 11/15/2020   -Follow blood cultures  -Maintain MAP > 65  -Off pressors      High anion gap metabolic acidosis  Assessment & Plan  -RESOLVED  -Likely from sepsis and poor perfusion    Supratherapeutic INR- (present on admission)  Assessment & Plan  RESOLVED  -At admission INR 4.46, Likely from  sepsis  -Warfarin held on admission  -11/3: INR 1.93  -Con't Lovenox will hold 24 hours before surgery    Paroxysmal atrial fibrillation (HCC)- (present on admission)  Assessment & Plan  -Controlled on metoprolol, amiodarone  -[id number]: 4 points; HASBLED: 2 points.  -On warfarin with supratherapeutic INR (4.46 10/30/2020); likely from sepsis    PLAN:  -Continue amiodarone  -Con't Lovenox will hold 24 hours before surgery  -resume metoprolol when appropriate  -Goal for K>4, Mg>2    LINSEY (acute kidney injury) ([facility])  Assessment & Plan  Resolved  -Likely ATN from sepsis vs cardiorenal  -D/C Lasix  -Monitor UOP, I&Os  -Avoid nephrotoxins    Elevated LFTs  Assessment & Plan  -Likely from sepsis  -Monitor CMP    Polycythemia  Assessment & Plan  -Chronic  -Likely 2/2 chronic hypoxia due to COPD vs SHASHANK  -Daily labs as inpatient  -F/U with PCP for outpatient sleep studies    Elevated troponin- (present on admission)  Assessment & Plan  -with elevated BNP, no acute ischemic changes on EKG  -Demand ischemia, Type 2 MI, in the setting of heart failure and sepsis  -11/2/20: Repeat ECHO - EF 50%, RVSP 40, mild AS    Morbid obesity with BMI of 40.0-44.9, adult ([facility])- (present on admission)  Assessment & Plan  -BMI 44.42  -Encourage mobility  -Weight loss counseling when appropriate    Heart failure with preserved ejection fraction, borderline, class IV ([facility])- (present on admission)  Assessment & Plan  -Continue diuresis, lasix changed to IV lasix 40 mg BID on 11/3/2020  -resume metoprolol when appropriate      Type 2 diabetes mellitus with complication, without long-term current use of insulin ([facility])- (present on admission)  Assessment & Plan  -HbA1C 7.8  -ISS  -Hypoglycemia protocol    COPD (chronic obstructive pulmonary disease) ([facility])  Assessment & Plan  Stable  Continue home inhalers  Supplemental O2  RT protocol    Hyponatremia  Assessment & Plan  IVC dilated on ECHO  Monitor sodium      Devin Vargas M.D.    Please note that  this dictation was created using voice recognition software. I have worked with technical experts from [facility] to optimize the interface.  I have made every reasonable attempt to correct obvious errors, but there may be errors of grammar and possibly content that I did not discover before finalizing the note.

## 2020-11-09 NOTE — PROGRESS NOTES
0702 Received report from night RN Karolina, POC discussed. Call light in place, bed lowered and locked, bed alarm on. Encourage pt to call if needed anything. Pt A&Ox4, 3lpm via nasal cannula, left BKA has bloody drainage on wound vac dressing.    0900 Wound care RN  Tiffany at bedside

## 2020-11-09 NOTE — PROGRESS NOTES
Voiding well post jenkins catheter removal today.  No incontinence thus far.  Attempted to stand pivot with 3 person assist to commode patient made it to EOB with max assist however was limited due to pain from progressing to commode, assisted back into bed and attempted BM on bed pan with small streak and gas.  Patient reports last bm was yesterday.

## 2020-11-10 LAB
ALBUMIN SERPL BCP-MCNC: 2.2 G/DL (ref 3.2–4.9)
ALBUMIN/GLOB SERPL: 0.5 G/DL
ALP SERPL-CCNC: 79 U/L (ref 30–99)
ALT SERPL-CCNC: 12 U/L (ref 2–50)
ANION GAP SERPL CALC-SCNC: 3 MMOL/L (ref 7–16)
AST SERPL-CCNC: 31 U/L (ref 12–45)
BASOPHILS # BLD AUTO: 0.7 % (ref 0–1.8)
BASOPHILS # BLD: 0.08 K/UL (ref 0–0.12)
BILIRUB SERPL-MCNC: 0.5 MG/DL (ref 0.1–1.5)
BUN SERPL-MCNC: 17 MG/DL (ref 8–22)
CALCIUM SERPL-MCNC: 8.2 MG/DL (ref 8.5–10.5)
CHLORIDE SERPL-SCNC: 94 MMOL/L (ref 96–112)
CO2 SERPL-SCNC: 34 MMOL/L (ref 20–33)
CREAT SERPL-MCNC: 0.76 MG/DL (ref 0.5–1.4)
EOSINOPHIL # BLD AUTO: 0.2 K/UL (ref 0–0.51)
EOSINOPHIL NFR BLD: 1.7 % (ref 0–6.9)
ERYTHROCYTE [DISTWIDTH] IN BLOOD BY AUTOMATED COUNT: 61.1 FL (ref 35.9–50)
GLOBULIN SER CALC-MCNC: 4.5 G/DL (ref 1.9–3.5)
GLUCOSE BLD-MCNC: 138 MG/DL (ref 65–99)
GLUCOSE BLD-MCNC: 152 MG/DL (ref 65–99)
GLUCOSE BLD-MCNC: 186 MG/DL (ref 65–99)
GLUCOSE SERPL-MCNC: 128 MG/DL (ref 65–99)
HCT VFR BLD AUTO: 31.9 % (ref 42–52)
HGB BLD-MCNC: 9.8 G/DL (ref 14–18)
IMM GRANULOCYTES # BLD AUTO: 0.16 K/UL (ref 0–0.11)
IMM GRANULOCYTES NFR BLD AUTO: 1.3 % (ref 0–0.9)
LYMPHOCYTES # BLD AUTO: 0.83 K/UL (ref 1–4.8)
LYMPHOCYTES NFR BLD: 6.9 % (ref 22–41)
MAGNESIUM SERPL-MCNC: 2 MG/DL (ref 1.5–2.5)
MCH RBC QN AUTO: 29.1 PG (ref 27–33)
MCHC RBC AUTO-ENTMCNC: 30.7 G/DL (ref 33.7–35.3)
MCV RBC AUTO: 94.7 FL (ref 81.4–97.8)
MONOCYTES # BLD AUTO: 0.63 K/UL (ref 0–0.85)
MONOCYTES NFR BLD AUTO: 5.2 % (ref 0–13.4)
NEUTROPHILS # BLD AUTO: 10.15 K/UL (ref 1.82–7.42)
NEUTROPHILS NFR BLD: 84.2 % (ref 44–72)
NRBC # BLD AUTO: 0 K/UL
NRBC BLD-RTO: 0 /100 WBC
PHOSPHATE SERPL-MCNC: 2.7 MG/DL (ref 2.5–4.5)
PLATELET # BLD AUTO: 236 K/UL (ref 164–446)
PMV BLD AUTO: 9.9 FL (ref 9–12.9)
POTASSIUM SERPL-SCNC: 4.3 MMOL/L (ref 3.6–5.5)
PROT SERPL-MCNC: 6.7 G/DL (ref 6–8.2)
RBC # BLD AUTO: 3.37 M/UL (ref 4.7–6.1)
SODIUM SERPL-SCNC: 131 MMOL/L (ref 135–145)
WBC # BLD AUTO: 12.1 K/UL (ref 4.8–10.8)

## 2020-11-10 PROCEDURE — 770006 HCHG ROOM/CARE - MED/SURG/GYN SEMI*

## 2020-11-10 PROCEDURE — 80053 COMPREHEN METABOLIC PANEL: CPT

## 2020-11-10 PROCEDURE — A9270 NON-COVERED ITEM OR SERVICE: HCPCS | Performed by: INTERNAL MEDICINE

## 2020-11-10 PROCEDURE — 97535 SELF CARE MNGMENT TRAINING: CPT | Mod: CO

## 2020-11-10 PROCEDURE — 97530 THERAPEUTIC ACTIVITIES: CPT | Mod: CQ

## 2020-11-10 PROCEDURE — A9270 NON-COVERED ITEM OR SERVICE: HCPCS | Performed by: STUDENT IN AN ORGANIZED HEALTH CARE EDUCATION/TRAINING PROGRAM

## 2020-11-10 PROCEDURE — 700102 HCHG RX REV CODE 250 W/ 637 OVERRIDE(OP): Performed by: INTERNAL MEDICINE

## 2020-11-10 PROCEDURE — 700111 HCHG RX REV CODE 636 W/ 250 OVERRIDE (IP): Performed by: INTERNAL MEDICINE

## 2020-11-10 PROCEDURE — 94760 N-INVAS EAR/PLS OXIMETRY 1: CPT

## 2020-11-10 PROCEDURE — 83735 ASSAY OF MAGNESIUM: CPT

## 2020-11-10 PROCEDURE — 700102 HCHG RX REV CODE 250 W/ 637 OVERRIDE(OP): Performed by: STUDENT IN AN ORGANIZED HEALTH CARE EDUCATION/TRAINING PROGRAM

## 2020-11-10 PROCEDURE — 99233 SBSQ HOSP IP/OBS HIGH 50: CPT | Mod: GC | Performed by: INTERNAL MEDICINE

## 2020-11-10 PROCEDURE — 84100 ASSAY OF PHOSPHORUS: CPT

## 2020-11-10 PROCEDURE — 700111 HCHG RX REV CODE 636 W/ 250 OVERRIDE (IP): Performed by: STUDENT IN AN ORGANIZED HEALTH CARE EDUCATION/TRAINING PROGRAM

## 2020-11-10 PROCEDURE — 82962 GLUCOSE BLOOD TEST: CPT

## 2020-11-10 PROCEDURE — 97110 THERAPEUTIC EXERCISES: CPT | Mod: CQ

## 2020-11-10 PROCEDURE — 85025 COMPLETE CBC W/AUTO DIFF WBC: CPT

## 2020-11-10 RX ORDER — HEPARIN SODIUM 5000 [USP'U]/ML
5000 INJECTION, SOLUTION INTRAVENOUS; SUBCUTANEOUS EVERY 8 HOURS
Status: DISCONTINUED | OUTPATIENT
Start: 2020-11-11 | End: 2020-11-16 | Stop reason: HOSPADM

## 2020-11-10 RX ADMIN — GABAPENTIN 300 MG: 300 CAPSULE ORAL at 04:28

## 2020-11-10 RX ADMIN — ROSUVASTATIN CALCIUM 40 MG: 20 TABLET, FILM COATED ORAL at 20:28

## 2020-11-10 RX ADMIN — Medication 400 MG: at 04:28

## 2020-11-10 RX ADMIN — ENOXAPARIN SODIUM 150 MG: 150 INJECTION SUBCUTANEOUS at 04:28

## 2020-11-10 RX ADMIN — CEFAZOLIN SODIUM 2 G: 2 INJECTION, SOLUTION INTRAVENOUS at 04:28

## 2020-11-10 RX ADMIN — HYDROCODONE BITARTRATE AND ACETAMINOPHEN 1 TABLET: 10; 325 TABLET ORAL at 09:32

## 2020-11-10 RX ADMIN — CEFAZOLIN SODIUM 2 G: 2 INJECTION, SOLUTION INTRAVENOUS at 21:12

## 2020-11-10 RX ADMIN — GLYCOPYRROLATE 1 CAPSULE: 15.6 CAPSULE RESPIRATORY (INHALATION) at 08:21

## 2020-11-10 RX ADMIN — GABAPENTIN 300 MG: 300 CAPSULE ORAL at 17:10

## 2020-11-10 RX ADMIN — GLYCOPYRROLATE 1 CAPSULE: 15.6 CAPSULE RESPIRATORY (INHALATION) at 20:34

## 2020-11-10 RX ADMIN — GABAPENTIN 300 MG: 300 CAPSULE ORAL at 12:04

## 2020-11-10 RX ADMIN — AMIODARONE HYDROCHLORIDE 200 MG: 200 TABLET ORAL at 04:28

## 2020-11-10 RX ADMIN — CEFAZOLIN SODIUM 2 G: 2 INJECTION, SOLUTION INTRAVENOUS at 15:44

## 2020-11-10 RX ADMIN — HYDROCODONE BITARTRATE AND ACETAMINOPHEN 1 TABLET: 10; 325 TABLET ORAL at 03:20

## 2020-11-10 RX ADMIN — HYDROCODONE BITARTRATE AND ACETAMINOPHEN 1 TABLET: 10; 325 TABLET ORAL at 15:44

## 2020-11-10 RX ADMIN — HYDROMORPHONE HYDROCHLORIDE 1 MG: 1 INJECTION, SOLUTION INTRAMUSCULAR; INTRAVENOUS; SUBCUTANEOUS at 20:54

## 2020-11-10 RX ADMIN — ALBUTEROL SULFATE 2 PUFF: 90 AEROSOL, METERED RESPIRATORY (INHALATION) at 03:36

## 2020-11-10 ASSESSMENT — LIFESTYLE VARIABLES
TOTAL SCORE: 0
ALCOHOL_USE: NO
HOW MANY TIMES IN THE PAST YEAR HAVE YOU HAD 5 OR MORE DRINKS IN A DAY: 0
EVER FELT BAD OR GUILTY ABOUT YOUR DRINKING: NO
TOTAL SCORE: 0
AVERAGE NUMBER OF DAYS PER WEEK YOU HAVE A DRINK CONTAINING ALCOHOL: 0
SUBSTANCE_ABUSE: 0
HAVE YOU EVER FELT YOU SHOULD CUT DOWN ON YOUR DRINKING: NO
HAVE PEOPLE ANNOYED YOU BY CRITICIZING YOUR DRINKING: NO
ON A TYPICAL DAY WHEN YOU DRINK ALCOHOL HOW MANY DRINKS DO YOU HAVE: 0
CONSUMPTION TOTAL: NEGATIVE
TOTAL SCORE: 0
EVER HAD A DRINK FIRST THING IN THE MORNING TO STEADY YOUR NERVES TO GET RID OF A HANGOVER: NO

## 2020-11-10 ASSESSMENT — ENCOUNTER SYMPTOMS
VOMITING: 0
HEMOPTYSIS: 0
DIZZINESS: 0
HEARTBURN: 0
LOSS OF CONSCIOUSNESS: 0
HALLUCINATIONS: 0
NAUSEA: 0
MYALGIAS: 0
CHILLS: 0
POLYDIPSIA: 0
ABDOMINAL PAIN: 0
WEIGHT LOSS: 0
DIAPHORESIS: 0
BACK PAIN: 0
HEADACHES: 0
WEAKNESS: 0
TINGLING: 0
SPUTUM PRODUCTION: 0
FEVER: 0
TREMORS: 0
SHORTNESS OF BREATH: 0
DEPRESSION: 0
BRUISES/BLEEDS EASILY: 0
ORTHOPNEA: 0
CONSTIPATION: 0
PALPITATIONS: 0
FLANK PAIN: 0
DIARRHEA: 0
MEMORY LOSS: 0
COUGH: 0

## 2020-11-10 ASSESSMENT — COGNITIVE AND FUNCTIONAL STATUS - GENERAL
WALKING IN HOSPITAL ROOM: TOTAL
DAILY ACTIVITIY SCORE: 14
MOBILITY SCORE: 6
SUGGESTED CMS G CODE MODIFIER DAILY ACTIVITY: CK
EATING MEALS: A LITTLE
PERSONAL GROOMING: A LITTLE
DRESSING REGULAR LOWER BODY CLOTHING: A LOT
DRESSING REGULAR UPPER BODY CLOTHING: A LOT
CLIMB 3 TO 5 STEPS WITH RAILING: TOTAL
MOVING FROM LYING ON BACK TO SITTING ON SIDE OF FLAT BED: UNABLE
SUGGESTED CMS G CODE MODIFIER MOBILITY: CN
TOILETING: A LOT
TURNING FROM BACK TO SIDE WHILE IN FLAT BAD: UNABLE
STANDING UP FROM CHAIR USING ARMS: TOTAL
MOVING TO AND FROM BED TO CHAIR: UNABLE
HELP NEEDED FOR BATHING: A LOT

## 2020-11-10 ASSESSMENT — GAIT ASSESSMENTS: GAIT LEVEL OF ASSIST: UNABLE TO PARTICIPATE

## 2020-11-10 NOTE — PROGRESS NOTES
Report received. Assumed care. Pt in bed. A/O x4. VSS. Responds appropriately. Denies chest pain and SOB. Assessment complete. Discussed POC, , pt verbalizes understanding. Explained importance of calling before getting OOB. Call light and belongings within reach. Bed alarm on. Bed in the lowest position. Treaded socks in place. Hourly rounding in progress.

## 2020-11-10 NOTE — PROGRESS NOTES
"   Orthopaedic Progress Note    Interval changes:  Patient doing well   Vac dressing in place without issue  Return to OR thursday this week for amp completion      ROS - Patient denies any new issues, except per above.       /58   Pulse 74   Temp 36.3 °C (97.3 °F) (Temporal)   Resp 17   Ht 1.956 m (6' 5\")   Wt (!) 148.3 kg (326 lb 15.1 oz)   SpO2 96%       Patient seen and examined  No acute distress  Breathing non labored  RRR  LLE dressing CDI, no contracture noted.       Recent Labs     11/07/20  0455 11/08/20  0450 11/09/20  0345   WBC 13.8* 13.5* 13.1*   RBC 4.75 4.42* 3.95*   HEMOGLOBIN 13.9* 12.8* 11.5*   HEMATOCRIT 43.7 40.8* 37.5*   MCV 92.0 92.3 94.9   MCH 29.3 29.0 29.1   MCHC 31.8* 31.4* 30.7*   RDW 59.3* 59.3* 60.9*   PLATELETCT 177 217 224   MPV 9.0 9.5 9.4       Active Hospital Problems    Diagnosis   • Right-sided heart failure ([facility]) [I50.810]     Priority: High   • Sepsis ([facility]) [A41.9]     Priority: High   • Cellulitis [L03.90]     Priority: High   • High anion gap metabolic acidosis [E87.2]     Priority: Medium   • Supratherapeutic INR [R79.1]     Priority: Medium   • Paroxysmal atrial fibrillation ([facility]) [I48.0]     Priority: Medium   • LINSEY (acute kidney injury) ([facility]) [N17.9]     Priority: Medium   • Polycythemia [D75.1]     Priority: Low   • Elevated troponin [R77.8]     Priority: Low   • Morbid obesity with BMI of 40.0-44.9, adult ([facility]) [E66.01, Z68.41]     Priority: Low   • Heart failure with preserved ejection fraction, borderline, class IV ([facility]) [I50.30]     Priority: Low   • Type 2 diabetes mellitus with complication, without long-term current use of insulin ([facility]) [E11.8]     Priority: Low   • COPD (chronic obstructive pulmonary disease) ([facility]) [J44.9]     Priority: Low   • Hyponatremia [E87.1]     Priority: Low   • Elevated LFTs [R79.89]       Assessment/Plan:  Patient doing well  POD#5 S/P Left guillotine through-knee amputation  Wt bearing status - NWB LLE  Wound care/Drains - " vac in place   Future Procedures - later this week amp completion  Sutures/Staples out- 14-21 day post definitive amputation  PT/OT-initiated  Antibiotics: ancef 2g IV q8, zyvox 600mg po BID  DVT Prophylaxis- TEDS/SCDs/Foot pumps  Cbeallos-none  Case Coordination for Discharge Planning - Disposition rehab

## 2020-11-10 NOTE — DISCHARGE PLANNING
Anticipated Discharge Disposition: Rehab vs SNF    Action: Pt requesting transfer to acute vs Rehab in/near [place], [place]. Explained that lateral transfer to possible.Rehab vs SNF transfer to [place] possible but transportation cost likely not covered. Pt to have more surgery on Thurs and discharge plans to be determined then.    Barriers to Discharge: Pending discharge plan    Plan: Cont to follow for discharge planning needs.

## 2020-11-10 NOTE — RESPIRATORY CARE
Oxygen Rounds      Patient found on    O2 L/m:  ___3______    Oxygen device:  __SNC______   Spo2: _____96____%      Respiratory device skin site inspection completed.

## 2020-11-10 NOTE — PROGRESS NOTES
Reinforce dressing on left BKA, leaking blood, informed Dr. Kevyn Mejia, [facility].  Per  Said MD, it's okay to give this dose now despite the bleeding episodes for the dressing on left BKA

## 2020-11-10 NOTE — PROGRESS NOTES
"Daily Progress Note:     Date of Service: 11/10/2020  Primary Team: [facility]   Attending: Km Menendez M.D.   Senior Resident: Dr. Leyva  Intern: Dr. Vargas  Contact:  [phone]    Chief Complaint:   Bilateral Lower Extremity Cellulitis    Subjective  Patient states, \" I wanna go home to [place] near my family.\" Had patient discuss with  about alternatives to care or rehab options in [place] after procedure here at [facility], please see their note for more details.  POD #6 s/p L through knee amputation.   Patient denies any new or worsening pains  Patient is currently on at 2-3L NC.    Interval Events:  -Surgery planned 11/12/2020  -Patient received dose of therapeutic Lovenox this morning, but will be started on heparin q8h DVT ppx dose due to drop in HgB and increased output and bleeding from left wound/hemo vac.    Consultants/Specialty:  Cardiology  Vascular surgery  Orthopedic surgery   ID    Review of Systems:   Review of Systems   Constitutional: Negative for chills, diaphoresis, fever and weight loss.   HENT: Negative for ear pain, hearing loss and tinnitus.    Respiratory: Negative for cough, hemoptysis, sputum production and shortness of breath.    Cardiovascular: Positive for leg swelling. Negative for chest pain, palpitations and orthopnea.   Gastrointestinal: Negative for abdominal pain, constipation, diarrhea, heartburn, nausea and vomiting.   Genitourinary: Negative for dysuria, flank pain, hematuria and urgency.   Musculoskeletal: Negative for back pain, joint pain and myalgias.   Skin: Negative for itching and rash.        +Right leg wounds.   Neurological: Negative for dizziness, tingling, tremors, loss of consciousness, weakness and headaches.   Endo/Heme/Allergies: Negative for polydipsia. Does not bruise/bleed easily.        +bleeding from wound/heme vac   Psychiatric/Behavioral: Negative for depression, hallucinations, memory loss, substance abuse and suicidal ideas. "       Objective Data:   Physical Exam:   Vitals:   Temp:  [36.3 °C (97.3 °F)-36.5 °C (97.7 °F)] 36.4 °C (97.5 °F)  Pulse:  [70-77] 77  Resp:  [16-17] 16  BP: (102-113)/(58-71) 102/58  SpO2:  [94 %-97 %] 96 %     Physical Exam  Vitals signs reviewed.   Constitutional:       Appearance: He is obese.      Comments: Tearful and sad mood. Desires to return to [place] to be near his family   HENT:      Head: Normocephalic and atraumatic.      Nose: Nose normal. No congestion.      Mouth/Throat:      Mouth: Mucous membranes are dry.      Pharynx: Oropharynx is clear. No oropharyngeal exudate.      Comments: Dry tongue  Eyes:      Extraocular Movements: Extraocular movements intact.      Conjunctiva/sclera: Conjunctivae normal.      Pupils: Pupils are equal, round, and reactive to light.   Neck:      Musculoskeletal: Normal range of motion and neck supple.      Comments: No JVD  Cardiovascular:      Rate and Rhythm: Normal rate and regular rhythm.      Pulses: Normal pulses.      Heart sounds: Normal heart sounds.   Pulmonary:      Effort: Pulmonary effort is normal. No respiratory distress.      Breath sounds: Normal breath sounds. No wheezing.      Comments: No crackles or increase work of breathing.  Chest:      Chest wall: No tenderness.   Abdominal:      General: Abdomen is flat. Bowel sounds are normal. There is no distension.      Palpations: Abdomen is soft.      Tenderness: There is no abdominal tenderness.      Hernia: A hernia (right umbilical hernia +, reducible) is present.   Musculoskeletal:         General: Swelling and tenderness present.      Comments: Wound vac on place. Noticeable bleeding from posterior end of wound vac through the dressing.    Skin:     General: Skin is warm.      Capillary Refill: Capillary refill takes less than 2 seconds.      Findings: Lesion present.      Comments: LLE: s/p through knee amputation, dressing clean and intact. Wound vac in place.   Right lower extremity swelling  and redness: chronic changes. No palpable crepitus. Leg wrapped in bandages    Neurological:      Mental Status: He is alert and oriented to person, place, and time. Mental status is at baseline.      Cranial Nerves: No cranial nerve deficit.      Motor: No weakness.   Psychiatric:         Behavior: Behavior normal.         Thought Content: Thought content normal.         Judgment: Judgment normal.           Labs:   Recent Labs     11/08/20  0450 11/09/20  0345 11/10/20  0327   WBC 13.5* 13.1* 12.1*   RBC 4.42* 3.95* 3.37*   HEMOGLOBIN 12.8* 11.5* 9.8*   HEMATOCRIT 40.8* 37.5* 31.9*   MCV 92.3 94.9 94.7   MCH 29.0 29.1 29.1   RDW 59.3* 60.9* 61.1*   PLATELETCT 217 224 236   MPV 9.5 9.4 9.9   NEUTSPOLYS 84.30* 87.10* 84.20*   LYMPHOCYTES 5.30* 5.10* 6.90*   MONOCYTES 5.30 4.30 5.20   EOSINOPHILS 1.00 1.50 1.70   BASOPHILS 0.60 0.40 0.70     Recent Labs     11/08/20  0450 11/10/20  0327   SODIUM 131* 131*   POTASSIUM 4.2 4.3   CHLORIDE 94* 94*   CO2 34* 34*   GLUCOSE 140* 128*   BUN 19 17        Imaging:   EC-ECHOCARDIOGRAM LTD W/ CONT   Final Result      DX-CHEST-LIMITED (1 VIEW)   Final Result      1.  Right internal jugular catheter is been placed and appears appropriately located      2.  Moderate pulmonary edema      3.  Enlarged cardiac silhouette      CT-EXTREMITY, LOWER W/O RIGHT   Final Result      1.  No discrete fluid collection is identified to suggest abscess.      2.  Diffuse subcutaneous edema and overlying skin thickening with venous varicosities.      3.  Atherosclerosis.      4.  Diffuse muscle atrophy.      CT-EXTREMITY, LOWER W/O LEFT   Final Result      1.  Left leg cellulitis without abscess or soft tissue gas      2.  Small vessel atherosclerotic plaque      3.  osteoarthritis of the left knee and left midfoot      4.  Muscular atrophy      EC-ECHOCARDIOGRAM COMPLETE W/O CONT   Final Result      DX-CHEST-PORTABLE (1 VIEW)   Final Result         1.  Pulmonary edema and/or infiltrates are  identified, which are stable since the prior exam.   2.  Cardiomegaly      US-EXTREMITY ARTERY LOWER BILAT   Final Result      US-EXTREMITY VENOUS LOWER BILAT   Final Result      DX-TIBIA AND FIBULA RIGHT   Final Result         1.  No acute traumatic bony injury.   2.  Tricompartmental degenerative changes of the knee   3.  Atherosclerosis      DX-TIBIA AND FIBULA LEFT   Final Result   Addendum 1 of 1   Addendum: Subtle clustered punctate lucencies lateral to the ankle are    seen which could represent tiny foci of soft tissue gas.      These findings were discussed with the patient's clinician, ELIAZBETH HILARIO,    on 10/30/2020 12:34 AM.      Final      DX-CHEST-PORTABLE (1 VIEW)   Final Result         1.  Interstitial pulmonary parenchymal prominence, compatible with interstitial edema and/or infiltrates.   2.  Cardiomegaly          Problem Representation:     54 y.o. male w/ CAD/CABG 2019, Afib, COPD, DM, and PAD who presented 10/29/2020 with LLE cellulitis, strep sepsis, and severe leg pain. 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    * Cellulitis  Assessment & Plan  Severe cellulitis of bilateral lower extremities    -Currently on cefazolin tentative end of 11/15/2020  -Vascular surgery, Orthopedics and ID are following  -Pain management - PO Norco and IV dilaudid PRN  -Changed to heparin q8h effective 11/11/2020, will hold before surgery 11/12  -s/p Left through knee amputation on 11/4, POD6. Revision surgery planned for 11/12  -right LE: c/c changes, debridement deferred  -Con't PT and wound care    Sepsis (HCC)  Assessment & Plan  -Likely from infection in bilateral lower extremities  -Orthopedic surgery,Vascular surgery,ID - following  -10/29: BC - GAS; repeat BC 11/1: NGTD  -10/30: WC - GAS, MSSA    PLAN:  -s/p Left through knee amputation, POD6. Revision surgery planned for 11/12  -Changed to heparin q8h effective 11/11/2020, will hold before surgery 11/12  -continue  Cefazolin, and per ID at least a 2-week antibiotic course due to the bacteremia, tentative end on 11/15/2020   -Maintain MAP > 65  -Off pressors      High anion gap metabolic acidosis  Assessment & Plan  -RESOLVED  -Likely from sepsis and poor perfusion    Supratherapeutic INR- (present on admission)  Assessment & Plan  RESOLVED  -At admission INR 4.46, Likely from sepsis  -Warfarin held on admission  -11/3: INR 1.93  -Changed to heparin q8h effective 11/11/2020, will hold before surgery 11/12    Paroxysmal atrial fibrillation (HCC)- (present on admission)  Assessment & Plan  -Controlled on metoprolol, amiodarone  -[id number]: 4 points; HASBLED: 2 points.  -On warfarin with supratherapeutic INR (4.46 10/30/2020); likely from sepsis    PLAN:  -Continue amiodarone  -Con't Lovenox will hold 24 hours before surgery  -resume metoprolol when appropriate  -Goal for K>4, Mg>2    LINSEY (acute kidney injury) ([facility])  Assessment & Plan  Resolved  -Likely ATN from sepsis vs cardiorenal  -D/C Lasix  -Monitor UOP, I&Os  -Avoid nephrotoxins    Elevated LFTs  Assessment & Plan  -Likely from sepsis  -Monitor CMP    Polycythemia  Assessment & Plan  -Chronic  -Likely 2/2 chronic hypoxia due to COPD vs SHASHANK  -Daily labs as inpatient  -F/U with PCP for outpatient sleep studies    Elevated troponin- (present on admission)  Assessment & Plan  -with elevated BNP, no acute ischemic changes on EKG  -Demand ischemia, Type 2 MI, in the setting of heart failure and sepsis  -11/2/20: Repeat ECHO - EF 50%, RVSP 40, mild AS    Morbid obesity with BMI of 40.0-44.9, adult ([facility])- (present on admission)  Assessment & Plan  -BMI 44.42  -Encourage mobility  -Weight loss counseling when appropriate    Heart failure with preserved ejection fraction, borderline, class IV ([facility])- (present on admission)  Assessment & Plan  -Continue diuresis, lasix changed to IV lasix 40 mg BID on 11/3/2020  -resume metoprolol when appropriate      Type 2 diabetes mellitus with  complication, without long-term current use of insulin (HCC)- (present on admission)  Assessment & Plan  -HbA1C 7.8  -ISS  -Hypoglycemia protocol    COPD (chronic obstructive pulmonary disease) ([facility])  Assessment & Plan  Stable  Continue home inhalers  Supplemental O2  RT protocol    Hyponatremia  Assessment & Plan  IVC dilated on ECHO  Monitor sodium      Devin Vargas M.D.    Please note that this dictation was created using voice recognition software. I have worked with technical experts from [facility] to optimize the interface.  I have made every reasonable attempt to correct obvious errors, but there may be errors of grammar and possibly content that I did not discover before finalizing the note.

## 2020-11-10 NOTE — DISCHARGE PLANNING
Care Transition Team Assessment  In the case of an emergency, pt's legal NOK is Kelvin Medrano     RNCM met with pt at bedside and obtained the information used in this assessment. Pt states he is moving from [place] to [place], [place].  Pt lives in a single story apt with wife. [facility] [address] Prior to current hospitalization, pt was completely independent in ADLS/IADLS. Pt drives and is able to attend necessary MD appointments. Pt has a good support system. Pt denies any hx of substance use and denies any dx of mh.    Information Source:pt  Orientation : Oriented x 4  Information Given By: Patient  Informant's Name: (Joshua)  Who is responsible for making decisions for patient? : Patient         Elopement Risk  Legal Hold: No  Ambulatory or Self Mobile in Wheelchair: No-Not an Elopement Risk  Elopement Risk: Not at Risk for Elopement    Interdisciplinary Discharge Planning  Does Admitting Nurse Feel This Could be a Complex Discharge?: Yes  Primary Care Physician: (Dr. Le in [place])  Lives with - Patient's Self Care Capacity: Spouse  Patient or legal guardian wants to designate a caregiver: No  Housing / Facility: 1 Story Apartment / Condo  Do You Take your Prescribed Medications Regularly: Yes  Able to Return to Previous ADL's: Yes  Mobility Issues: Yes  Prior Services: None  Durable Medical Equipment: Home Oxygen  DME Provider / Phone: Lilia)    Discharge Preparedness  What is your plan after discharge?: Uncertain - pending medical team collaboration  What are your discharge supports?: Spouse  Prior Functional Level: Ambulatory, Drives Self, Independent with Activities of Daily Living, Independent with Medication Management  Difficulity with ADLs: None  Difficulity with IADLs: None    Functional Assesment  Prior Functional Level: Ambulatory, Drives Self, Independent with Activities of Daily Living, Independent with Medication Management    Finances  Prescription Coverage: Yes               Advance Directive  Advance Directive?: DPOA for Health Care    Domestic Abuse  Have you ever been the victim of abuse or violence?: No  Physical Abuse or Sexual Abuse: No  Verbal Abuse or Emotional Abuse: No  Possible Abuse/Neglect Reported to:: Not Applicable    Psychological Assessment  History of Substance Abuse: None  History of Psychiatric Problems: No

## 2020-11-10 NOTE — THERAPY
Occupational Therapy  Daily Treatment     Patient Name: Joshua Medrano  Age:  54 y.o., Sex:  male  Medical Record #: [id number]  Today's Date: 11/10/2020       Precautions: Fall Risk  Comments: through the knee amp LLE, going for sx 11/12 for AKA, no official orders for RLE, c/o pain in RLE     Assessment    Pt seen for OT tx. Continues to be grossly limited by decreased activity tolerance, pain and generalized weakness impacting ability to complete ADLs and ADL transfers independently. Required extensive time to complete ADLs and max cues for redirection to complete ADLs in a timely manner d/t being hyper verbose. Pt pleasant, cooperative and motivated to participate in therapies to regain strength and independence in ADLs and ADL transfers. Pt having sx on 11/12, will follow up post-op as appropriate.    Plan    Continue current treatment plan.    DC Equipment Recommendations: Unable to determine at this time  Discharge Recommendations: Recommend post-acute placement for additional occupational therapy services prior to discharge home       11/10/20 1015   Cognition    Comments pleasant and cooperative, hyper verbose throughout session w/ cues for redirection. required extensive amount of time to complete ADLs d/t being easily distracted and hyper verbose.    Other Treatments   Other Treatments Provided Extensive psychosocial intervention addressed regarding current limitations, d/c planning and healing process. Pt on phone talking w/ SO about healing and POC.    Bed Mobility    Supine to Sit Minimal Assist  (into long sitting in bed )   Sit to Supine Supervised   Activities of Daily Living   Grooming Supervision;Seated   Upper Body Dressing Minimal Assist   Lower Body Dressing Maximal Assist   Toileting Maximal Assist   Functional Mobility   Sit to Stand Unable to Participate   Short Term Goals   Short Term Goal # 1 Pt will complete BSC transfer with modA using slide board if needed by discharge.   Goal Outcome #  1 Goal not met   Short Term Goal # 2 Pt will complete toileting with Johnie by discharge.   Goal Outcome # 2 Goal not met   Short Term Goal # 3 Pt will complete LE dressing with Johnie and use of AE by discharge.   Goal Outcome # 3 Goal not met   Anticipated Discharge Equipment and Recommendations   DC Equipment Recommendations Unable to determine at this time   Discharge Recommendations Recommend post-acute placement for additional occupational therapy services prior to discharge home

## 2020-11-11 LAB
BASOPHILS # BLD AUTO: 0.8 % (ref 0–1.8)
BASOPHILS # BLD: 0.08 K/UL (ref 0–0.12)
EOSINOPHIL # BLD AUTO: 0.15 K/UL (ref 0–0.51)
EOSINOPHIL NFR BLD: 1.5 % (ref 0–6.9)
ERYTHROCYTE [DISTWIDTH] IN BLOOD BY AUTOMATED COUNT: 62.3 FL (ref 35.9–50)
GLUCOSE BLD-MCNC: 130 MG/DL (ref 65–99)
GLUCOSE BLD-MCNC: 134 MG/DL (ref 65–99)
GLUCOSE BLD-MCNC: 167 MG/DL (ref 65–99)
HCT VFR BLD AUTO: 27.9 % (ref 42–52)
HGB BLD-MCNC: 8.7 G/DL (ref 14–18)
IMM GRANULOCYTES # BLD AUTO: 0.13 K/UL (ref 0–0.11)
IMM GRANULOCYTES NFR BLD AUTO: 1.3 % (ref 0–0.9)
INR PPP: 1.17 (ref 0.87–1.13)
LYMPHOCYTES # BLD AUTO: 0.73 K/UL (ref 1–4.8)
LYMPHOCYTES NFR BLD: 7.1 % (ref 22–41)
MCH RBC QN AUTO: 29.7 PG (ref 27–33)
MCHC RBC AUTO-ENTMCNC: 31.2 G/DL (ref 33.7–35.3)
MCV RBC AUTO: 95.2 FL (ref 81.4–97.8)
MONOCYTES # BLD AUTO: 0.72 K/UL (ref 0–0.85)
MONOCYTES NFR BLD AUTO: 7 % (ref 0–13.4)
NEUTROPHILS # BLD AUTO: 8.45 K/UL (ref 1.82–7.42)
NEUTROPHILS NFR BLD: 82.3 % (ref 44–72)
NRBC # BLD AUTO: 0 K/UL
NRBC BLD-RTO: 0 /100 WBC
PLATELET # BLD AUTO: 226 K/UL (ref 164–446)
PMV BLD AUTO: 9.8 FL (ref 9–12.9)
PROTHROMBIN TIME: 15.3 SEC (ref 12–14.6)
RBC # BLD AUTO: 2.93 M/UL (ref 4.7–6.1)
WBC # BLD AUTO: 10.3 K/UL (ref 4.8–10.8)

## 2020-11-11 PROCEDURE — 97606 NEG PRS WND THER DME>50 SQCM: CPT

## 2020-11-11 PROCEDURE — 700102 HCHG RX REV CODE 250 W/ 637 OVERRIDE(OP): Performed by: INTERNAL MEDICINE

## 2020-11-11 PROCEDURE — 700111 HCHG RX REV CODE 636 W/ 250 OVERRIDE (IP): Performed by: STUDENT IN AN ORGANIZED HEALTH CARE EDUCATION/TRAINING PROGRAM

## 2020-11-11 PROCEDURE — 700102 HCHG RX REV CODE 250 W/ 637 OVERRIDE(OP): Performed by: STUDENT IN AN ORGANIZED HEALTH CARE EDUCATION/TRAINING PROGRAM

## 2020-11-11 PROCEDURE — 85025 COMPLETE CBC W/AUTO DIFF WBC: CPT

## 2020-11-11 PROCEDURE — 302098 PASTE RING (FLAT): Performed by: INTERNAL MEDICINE

## 2020-11-11 PROCEDURE — A9270 NON-COVERED ITEM OR SERVICE: HCPCS | Performed by: STUDENT IN AN ORGANIZED HEALTH CARE EDUCATION/TRAINING PROGRAM

## 2020-11-11 PROCEDURE — 85610 PROTHROMBIN TIME: CPT

## 2020-11-11 PROCEDURE — A9270 NON-COVERED ITEM OR SERVICE: HCPCS | Performed by: INTERNAL MEDICINE

## 2020-11-11 PROCEDURE — 82962 GLUCOSE BLOOD TEST: CPT | Mod: 91

## 2020-11-11 PROCEDURE — 700111 HCHG RX REV CODE 636 W/ 250 OVERRIDE (IP): Performed by: INTERNAL MEDICINE

## 2020-11-11 PROCEDURE — 94640 AIRWAY INHALATION TREATMENT: CPT

## 2020-11-11 PROCEDURE — 99232 SBSQ HOSP IP/OBS MODERATE 35: CPT | Mod: GC | Performed by: INTERNAL MEDICINE

## 2020-11-11 PROCEDURE — 770006 HCHG ROOM/CARE - MED/SURG/GYN SEMI*

## 2020-11-11 RX ADMIN — HYDROCODONE BITARTRATE AND ACETAMINOPHEN 1 TABLET: 10; 325 TABLET ORAL at 20:42

## 2020-11-11 RX ADMIN — AMIODARONE HYDROCHLORIDE 200 MG: 200 TABLET ORAL at 05:55

## 2020-11-11 RX ADMIN — HYDROCODONE BITARTRATE AND ACETAMINOPHEN 1 TABLET: 10; 325 TABLET ORAL at 13:30

## 2020-11-11 RX ADMIN — GABAPENTIN 300 MG: 300 CAPSULE ORAL at 11:51

## 2020-11-11 RX ADMIN — CEFAZOLIN SODIUM 2 G: 2 INJECTION, SOLUTION INTRAVENOUS at 22:21

## 2020-11-11 RX ADMIN — CEFAZOLIN SODIUM 2 G: 2 INJECTION, SOLUTION INTRAVENOUS at 05:56

## 2020-11-11 RX ADMIN — DOCUSATE SODIUM 50 MG AND SENNOSIDES 8.6 MG 2 TABLET: 8.6; 5 TABLET, FILM COATED ORAL at 16:51

## 2020-11-11 RX ADMIN — CEFAZOLIN SODIUM 2 G: 2 INJECTION, SOLUTION INTRAVENOUS at 13:46

## 2020-11-11 RX ADMIN — GLYCOPYRROLATE 1 CAPSULE: 15.6 CAPSULE RESPIRATORY (INHALATION) at 20:42

## 2020-11-11 RX ADMIN — Medication 400 MG: at 05:55

## 2020-11-11 RX ADMIN — GABAPENTIN 300 MG: 300 CAPSULE ORAL at 05:54

## 2020-11-11 RX ADMIN — HEPARIN SODIUM 5000 UNITS: 5000 INJECTION, SOLUTION INTRAVENOUS; SUBCUTANEOUS at 13:47

## 2020-11-11 RX ADMIN — DOCUSATE SODIUM 50 MG AND SENNOSIDES 8.6 MG 2 TABLET: 8.6; 5 TABLET, FILM COATED ORAL at 05:54

## 2020-11-11 RX ADMIN — HYDROMORPHONE HYDROCHLORIDE 1 MG: 1 INJECTION, SOLUTION INTRAMUSCULAR; INTRAVENOUS; SUBCUTANEOUS at 01:17

## 2020-11-11 RX ADMIN — ROSUVASTATIN CALCIUM 40 MG: 20 TABLET, FILM COATED ORAL at 20:42

## 2020-11-11 RX ADMIN — HYDROMORPHONE HYDROCHLORIDE 1 MG: 1 INJECTION, SOLUTION INTRAMUSCULAR; INTRAVENOUS; SUBCUTANEOUS at 22:27

## 2020-11-11 RX ADMIN — HYDROMORPHONE HYDROCHLORIDE 1 MG: 1 INJECTION, SOLUTION INTRAMUSCULAR; INTRAVENOUS; SUBCUTANEOUS at 13:53

## 2020-11-11 RX ADMIN — GABAPENTIN 300 MG: 300 CAPSULE ORAL at 16:51

## 2020-11-11 RX ADMIN — HYDROCODONE BITARTRATE AND ACETAMINOPHEN 1 TABLET: 10; 325 TABLET ORAL at 03:16

## 2020-11-11 ASSESSMENT — ENCOUNTER SYMPTOMS
COUGH: 0
DIZZINESS: 0
DIARRHEA: 0
WEAKNESS: 0
MEMORY LOSS: 0
TREMORS: 0
PALPITATIONS: 0
HALLUCINATIONS: 0
SPUTUM PRODUCTION: 0
TINGLING: 0
BRUISES/BLEEDS EASILY: 0
FEVER: 0
FLANK PAIN: 0
DIAPHORESIS: 0
HEARTBURN: 0
POLYDIPSIA: 0
ORTHOPNEA: 0
CHILLS: 0
SHORTNESS OF BREATH: 0
LOSS OF CONSCIOUSNESS: 0
CONSTIPATION: 0
HEADACHES: 0
NAUSEA: 0
ABDOMINAL PAIN: 0
MYALGIAS: 0
HEMOPTYSIS: 0
BACK PAIN: 0
DEPRESSION: 0
VOMITING: 0
WEIGHT LOSS: 0

## 2020-11-11 ASSESSMENT — PAIN DESCRIPTION - PAIN TYPE
TYPE: ACUTE PAIN
TYPE: ACUTE PAIN

## 2020-11-11 ASSESSMENT — FIBROSIS 4 INDEX: FIB4 SCORE: 2.12

## 2020-11-11 ASSESSMENT — LIFESTYLE VARIABLES: SUBSTANCE_ABUSE: 0

## 2020-11-11 NOTE — WOUND TEAM
[facility]  Inpatient Services  Wound and Skin Care Progress Note    Admission Date: 10/29/2020     Last order of IP CONSULT TO WOUND CARE was found on 11/4/2020 from Hospital Encounter on 10/29/2020     HPI, PMH, SH: Reviewed    Unit where seen by Wound Team: [id number]     WOUND CONSULT/FOLLOW UP RELATED TO:  NPWT change to LLE     Self Report / Pain Level:  9/10 with removal of the VAC.  Premed with PO, IV dilaudid, and topical 4% lidocaine. Tolerated well.       OBJECTIVE:  Patient on regular redistribution with waffle overlay. NPWT to [facility] site. RLE dressing in place, elevated with pillows.     WOUND TYPE, LOCATION, CHARACTERISTICS (Pressure Injuries: location, stage, POA or date identified)     Negative Pressure Wound Therapy 11/04/20 Surgical Leg;Knee Left (Active)   Vacuum Serial Number [id number]    NPWT Pump Mode / Pressure Setting Ulta;Continuous    Dressing Type Medium;Black Foam (Regular)    Number of Foam Pieces Used 2    Canister Changed No    Output (mL) 480 mL    NEXT Dressing Change/Treatment Date 11/13/20    VAC VeraFlo Irrigant Normal Saline    VAC VeraFlo Soak Time (mins) 3    VAC VeraFlo Instill Volume (ml) 22    VAC VeraFlo - Therapy Time (hrs) 3    VAC VeraFlo Pressure (mm/Hg) Continuous;125 mmHg        Wound 11/04/20 Incision Leg Left Wound Vac (Active)   Wound Image      Site Assessment Red;Tan;White    Periwound Assessment Clean;Dry;Intact    Margins Defined edges    Closure Secondary intention    Drainage Amount Moderate    Drainage Description Serosanguineous    Treatments Cleansed;Irrigation;Site care    Wound Cleansing Approved Wound Cleanser    Periwound Protectant Drape;Paste Ring    Dressing Cleansing/Solutions Normal Saline    Dressing Options Wound Vac    Dressing Changed Changed    Dressing Status Clean;Dry;Intact    Dressing Change/Treatment Frequency By Wound Team Only    NEXT Dressing Change/Treatment Date 11/13/20    NEXT Weekly Photo (Inpatient Only)  20    Non-staged Wound Description Full thickness    Wound Length (cm) 17.8 cm    Wound Width (cm) 16 cm    Wound Surface Area (cm^2) 284.8 cm^2    Shape circular    Wound Odor None    Exposed Structures Bone;Muscle;Vessel;Adipose    WOUND NURSE ONLY - Time Spent with Patient (mins) 60             Vascular:    ANNELIESE:   ANNELIESE Results, Last 30 Days Us-extremity Artery Lower Bilat    Result Date: 10/30/2020  Narrative Lower Extremity  Arterial Duplex Report  Vascular Laboratory  CONCLUSIONS  1) Bilateral atherosclerosis  2) Right distal SFA 50-75% stenosis.  2) Monophasic waveforms in the left lower extremity  JARRETT WHITE  Exam Date:     10/30/2020 01:09  Room #:     Inpatient  Priority:     Stat  Ht (in):             Wt (lb):  Ordering Physician:        ELIZABETH HILARIO  Referring Physician:       ELIZABETH HILARIO  Sonographer:               Silvia Lassiter RVT, RDMS  Study Type:                Complete Bilateral  Technical Quality:         Adequate  Age:    54    Gender:     M  MRN:    [id number]  :    1965      BSA:  Indications:     Ulceration of LE  CPT Codes:       97093  ICD Codes:       707.1  History:         Nonhealing, raw and bleeding wounds bilaterally. Blue                   discoloration of limbs. Severe pain bilaterally.  Limitations:     Pain.                RIGHT  Waveform        Peak Systolic Velocity (cm/s)                  Prox    Prox-Mid  Mid    Mid-Dist  Distal  Triphasic                         107                      CFA  Triphasic       110                                        PFA  Triphasic       96                89      214      79      SFA  Biphasic                          44                       POP  Biphasic                                           49      AT  Not                                                        PT  attained  Not                                                        BRITTON  attained                LEFT  Waveform        Peak  Systolic Velocity (cm/s)                  Prox    Prox-Mid  Mid    Mid-Dist  Distal  Monophasic                        137              109     CFA  Biphasic        67                                         PFA  Monophasic      98                125                      SFA                                                             POP                                                             AT                                                             PT                                                             BRITTON  FINDINGS  Right.  There is atherosclerotic plaque seen throughout the limb.  Stenosis of the distal femoral artery. Velocities are consistent with 50-  75% stenosis.  Waveforms at the common femoral, profunda femoral and femoral artery are  triphasic.  Waveforms at the popliteal and distal anterior tibial artery are biphasic.  The remaining vessels were not properly visualized evaluated due to edema,  severe pain in non-healing bleeding wounds and large body habitus.  Left.  There is atherosclerotic plaque seen throughout the limb.  Waveforms at the common femoral, profunda femoral and femoral artery are  monophasic.  The remaining vessels of the left lower limb were not properly  visualized/evaluated due to edema, severe pain in area of non-healing  bleeding wounds and large body habitus.  Nima Dill MD  (Electronically Signed)  Final Date:      30 October 2020                   03:22      Lab Values:    Lab Results   Component Value Date/Time    WBC 10.3 11/11/2020 05:54 AM    RBC 2.93 (L) 11/11/2020 05:54 AM    HEMOGLOBIN 8.7 (L) 11/11/2020 05:54 AM    HEMATOCRIT 27.9 (L) 11/11/2020 05:54 AM    CREACTPROT 30.09 (H) 10/29/2020 10:38 PM    SEDRATEWES 0 10/30/2020 04:15 AM    HBA1C 7.8 (H) 10/30/2020 06:53 AM        Culture Results show:  Recent Results (from the past 720 hour(s))   CULTURE WOUND W/ GRAM STAIN    Collection Time: 10/30/20  1:03 AM    Specimen: Abscess; Wound   Result Value  Ref Range    Significant Indicator POS (POS)     Source WND     Site Bilateral Lower Extremity     Culture Result Light growth mixed skin eloy. (A)     Gram Stain Result       Moderate Gram positive cocci.  Rare small Gram positive rods.      Culture Result Staphylococcus aureus  Heavy growth   (A)     Culture Result (A)      Beta Hemolytic Streptococcus group A  Heavy growth         Susceptibility    Staphylococcus aureus - JELANI     Azithromycin >4 Resistant mcg/mL     Clindamycin <=0.5 Sensitive mcg/mL     Cefazolin <=8 Sensitive mcg/mL     Ceftaroline <=0.5 Sensitive mcg/mL     Daptomycin <=1 Sensitive mcg/mL     Ampicillin/sulbactam <=8/4 Sensitive mcg/mL     Erythromycin >4 Resistant mcg/mL     Vancomycin 1 Sensitive mcg/mL     Oxacillin 0.5 Sensitive mcg/mL     Penicillin >8 Resistant mcg/mL     Pip/Tazobactam <=4 Sensitive mcg/mL     Trimeth/Sulfa <=0.5/9.5 Sensitive mcg/mL     Tetracycline <=4 Sensitive mcg/mL       INTERVENTIONS BY WOUND TEAM:  Patient and chart reviewed. In to change LLE NPWT.    NPWT to LLE: Removed ACE bandage, soaked foam with NS and topical lidocaine. Carefully removed dressing, no retained foam. Cleansed wound and periwound with NS and gauze.  Periwound prepped with no-sting skin prep and periwound. Applied one full spiral of regular black foam, secured top half in place first with drape. Then cut wedge from other spiral foam and placed over the remainder of the wound bed and secured in place with drape. Hole cut at proximal end and TRAC pad was applied. Suction obtained at 125mmhg continuous, no leaks present. Test run with NS completed, see settings above, no leaks present.     Interdisciplinary consultation: Patient, Bedside RN (Danita)    EVALUATION / RATIONALE FOR TREATMENT: patient admitted with sepsis and worsening BLE wounds. Patient RLE with multiple circumferential weeping wounds, full thickness, applied Viscopatch zinc impregnated gauze to encourage re-epithelialization of  superficial 100% viable wound bed, and to provide a non-stick wound contact layer. Patient went to OR 11/04 and had guillotine amputation through the knee of the LLE. Patient with NPWT in place until patient can go to OR for definitive closure and amputation if needed. Bone, tendon, muscle, vessel, fat, etc all exposed. Will benefit from NPWT to maintain moisture to the bone, and assist in management of drainage, and to monitor for improvement or worsening of wound. Wound team will change with wound vac changes or if NPWT is discontinued, we will follow RLE once weekly. Continue vac changes MWF until scheduled surgery with Dr. Durbin on 11/12/2020.        Goals: Steady decrease in wound area and depth weekly.    NURSING PLAN OF CARE ORDERS (X):    Dressing changes: See Dressing Care orders: X  Skin care: See Skin Care orders: X  RN Prevention Protocol:   Rectal tube care: See Rectal Tube Care orders:   Other orders:    WOUND TEAM PLAN OF CARE:   Dressing changes by wound team:                   Follow up 3 times weekly:          NPWT change 3 times weekly:   X - LLE  Follow up 1-2 times weekly:    X- RLE  Follow up Bi-Monthly:                   Follow up as needed:       Other (explain):     Anticipated discharge plans: Patient wants to transfer to [facility].   LTACH:        SNF/Rehab: x                 Home Health Care:           Outpatient Wound Center:            Self Care:

## 2020-11-11 NOTE — CARE PLAN
Problem: Communication  Goal: The ability to communicate needs accurately and effectively will improve  Outcome: PROGRESSING AS EXPECTED     Problem: Safety  Goal: Will remain free from injury  Outcome: PROGRESSING AS EXPECTED     Problem: Mobility  Goal: Risk for activity intolerance will decrease  Outcome: PROGRESSING SLOWER THAN EXPECTED

## 2020-11-11 NOTE — CARE PLAN
Problem: Safety  Goal: Will remain free from injury  Outcome: PROGRESSING AS EXPECTED   No injuries thus far. Remains in bed. Call light in reach.       Problem: Safety  Goal: Will remain free from falls  Outcome: PROGRESSING AS EXPECTED   Bed low and locked. Uses call light appropriately.    Yes

## 2020-11-11 NOTE — PROGRESS NOTES
Patient tolerated right lower extremity dressing change. New dressing in place, dry and intact. RLE cleaned and dressed with viscopaste, abdominal pad, and dry gauze.

## 2020-11-11 NOTE — PROGRESS NOTES
"Attempted to collect Prothrombin Time lab, could not get any blood return from central line. Called lab to collect, but patient refused. Patient stated \"It was rude to interrupt him while he is eating.\" Will  collect lab at a later time.  "

## 2020-11-11 NOTE — PROGRESS NOTES
"   Orthopaedic Progress Note    Interval changes:  Patient doing well   Vac dressing track pad inadvertently partially pulled off by patient during mobilzation- new track pad placed   Return to OR thursday this week for amp completion    NPO after midnight tomorrow     ROS - Patient denies any new issues, except per above.       BP (!) 99/57   Pulse 80   Temp 36.6 °C (97.9 °F) (Temporal)   Resp 17   Ht 1.956 m (6' 5\")   Wt (!) 148.3 kg (326 lb 15.1 oz)   SpO2 96%       Patient seen and examined  No acute distress  Breathing non labored  RRR  LLE vac dressing partially pulled off, new drape and track pad placed, seal returned without leak, no contracture noted.       Recent Labs     11/08/20  0450 11/09/20  0345 11/10/20  0327   WBC 13.5* 13.1* 12.1*   RBC 4.42* 3.95* 3.37*   HEMOGLOBIN 12.8* 11.5* 9.8*   HEMATOCRIT 40.8* 37.5* 31.9*   MCV 92.3 94.9 94.7   MCH 29.0 29.1 29.1   MCHC 31.4* 30.7* 30.7*   RDW 59.3* 60.9* 61.1*   PLATELETCT 217 224 236   MPV 9.5 9.4 9.9       Active Hospital Problems    Diagnosis   • Right-sided heart failure (HCC) [I50.810]     Priority: High   • Sepsis (HCC) [A41.9]     Priority: High   • Cellulitis [L03.90]     Priority: High   • High anion gap metabolic acidosis [E87.2]     Priority: Medium   • Supratherapeutic INR [R79.1]     Priority: Medium   • Paroxysmal atrial fibrillation ([facility]) [I48.0]     Priority: Medium   • LINSEY (acute kidney injury) ([facility]) [N17.9]     Priority: Medium   • Polycythemia [D75.1]     Priority: Low   • Elevated troponin [R77.8]     Priority: Low   • Morbid obesity with BMI of 40.0-44.9, adult ([facility]) [E66.01, Z68.41]     Priority: Low   • Heart failure with preserved ejection fraction, borderline, class IV ([facility]) [I50.30]     Priority: Low   • Type 2 diabetes mellitus with complication, without long-term current use of insulin (HCC) [E11.8]     Priority: Low   • COPD (chronic obstructive pulmonary disease) (HCC) [J44.9]     Priority: Low   • Hyponatremia [E87.1] "     Priority: Low   • Elevated LFTs [R79.89]       Assessment/Plan:  Patient doing well  OR Thursday   NPO mdnt tomorrow  POD#6 S/P Left guillotine through-knee amputation  Wt bearing status - NWB LLE  Wound care/Drains - vac in place   Future Procedures - This thursday for amp completion  Sutures/Staples out- 14-21 day post definitive amputation  PT/OT-initiated  Antibiotics: ancef 2g IV q8, zyvox 600mg po BID  DVT Prophylaxis- TEDS/SCDs/Foot pumps  Ceballos-none  Case Coordination for Discharge Planning - Disposition rehab

## 2020-11-11 NOTE — THERAPY
"Physical Therapy   Daily Treatment     Patient Name: Joshua Medrano  Age:  54 y.o., Sex:  male  Medical Record #: [id number]  Today's Date: 11/10/2020     Precautions: Fall Risk, Other (See Comments)    Assessment    Pt progressing as expected w/ therapy. Pt requires a lot of time to perform all mobility d/t hyper verbose nature. Pt stating he talks to make himself feel better before moving. Pt able to use UE's and core to get to long sit and maintained that position for a long time. Pt unable to tolerate the LE in gravity dependent for long d/t increased draining from the L LE. Pt educated on an HEP for B LE to maintain strength and ROM. Pt to poss have sx Thurs for AKA. Pt open for therapy Friday post sx.    Plan    Continue current treatment plan.    DC Equipment Recommendations: Unable to determine at this time  Discharge Recommendations: Recommend post-acute placement for additional physical therapy services prior to discharge home      Subjective    \"Why did they cut off the L when the R is the bad one?\"     Objective       11/10/20 1057   Precautions   Precautions Fall Risk;Other (See Comments)   Comments L through knee amp w/ wound vac, going for AKA 11/12   Bed Mobility    Supine to Sit Minimal Assist   Sit to Supine Minimal Assist   Scooting Moderate Assist   Comments Pt able to come to long sit. Poor UE strength.   Functional Mobility   Sit to Stand Unable to Participate   Bed, Chair, Wheelchair Transfer Unable to Participate   Mobility Pt w/ high draining from L LE. Unable to stand d/t this.   Short Term Goals    Short Term Goal # 1 Pt will perform bed mobility with min A in 6 visits.   Goal Outcome # 1 goal not met   Short Term Goal # 2 Pt will sit, self supported at EOB with supervision x 5 min in 6 visits to prepare for slide board transfers.    Goal Outcome # 2 Goal not met   Short Term Goal # 3 Pt will scoot along EOB with mod A in 6 visits to prepare for slide board transfer.    Goal Outcome # 3 " Goal not met

## 2020-11-12 ENCOUNTER — ANESTHESIA EVENT (OUTPATIENT)
Dept: SURGERY | Facility: MEDICAL CENTER | Age: 55
DRG: 853 | End: 2020-11-12
Payer: MEDICARE

## 2020-11-12 ENCOUNTER — ANESTHESIA (OUTPATIENT)
Dept: SURGERY | Facility: MEDICAL CENTER | Age: 55
DRG: 853 | End: 2020-11-12
Payer: MEDICARE

## 2020-11-12 PROBLEM — E87.29 HIGH ANION GAP METABOLIC ACIDOSIS: Status: RESOLVED | Noted: 2020-10-30 | Resolved: 2020-11-12

## 2020-11-12 PROBLEM — R79.89 ELEVATED LFTS: Status: RESOLVED | Noted: 2020-10-30 | Resolved: 2020-11-12

## 2020-11-12 PROBLEM — D75.1 POLYCYTHEMIA: Status: RESOLVED | Noted: 2020-10-30 | Resolved: 2020-11-12

## 2020-11-12 PROBLEM — R79.1 SUPRATHERAPEUTIC INR: Status: RESOLVED | Noted: 2019-09-11 | Resolved: 2020-11-12

## 2020-11-12 PROBLEM — E66.01 MORBID OBESITY WITH BMI OF 40.0-44.9, ADULT (HCC): Status: RESOLVED | Noted: 2018-10-27 | Resolved: 2020-11-12

## 2020-11-12 LAB
ABO GROUP BLD: ABNORMAL
ALBUMIN SERPL BCP-MCNC: 2.2 G/DL (ref 3.2–4.9)
ALBUMIN/GLOB SERPL: 0.5 G/DL
ALP SERPL-CCNC: 68 U/L (ref 30–99)
ALT SERPL-CCNC: 7 U/L (ref 2–50)
ANION GAP SERPL CALC-SCNC: 6 MMOL/L (ref 7–16)
AST SERPL-CCNC: 23 U/L (ref 12–45)
BARCODED ABORH UBTYP: 5100
BARCODED ABORH UBTYP: 5100
BARCODED ABORH UBTYP: 600
BARCODED ABORH UBTYP: 6200
BARCODED ABORH UBTYP: 6200
BARCODED PRD CODE UBPRD: ABNORMAL
BARCODED PRD CODE UBPRD: NORMAL
BARCODED UNIT NUM UBUNT: ABNORMAL
BARCODED UNIT NUM UBUNT: NORMAL
BASOPHILS # BLD AUTO: 0.7 % (ref 0–1.8)
BASOPHILS # BLD: 0.07 K/UL (ref 0–0.12)
BILIRUB SERPL-MCNC: 0.3 MG/DL (ref 0.1–1.5)
BLD GP AB INVEST PLASRBC-IMP: ABNORMAL
BLD GP AB SCN SERPL QL: ABNORMAL
BUN SERPL-MCNC: 17 MG/DL (ref 8–22)
CALCIUM SERPL-MCNC: 8.5 MG/DL (ref 8.5–10.5)
CHLORIDE SERPL-SCNC: 94 MMOL/L (ref 96–112)
CO2 SERPL-SCNC: 31 MMOL/L (ref 20–33)
COMPONENT F 8504F: NORMAL
COMPONENT FT 8504FT: NORMAL
COMPONENT R 8504R: ABNORMAL
COMPONENT R 8504R: NORMAL
COMPONENT R 8504R: NORMAL
COVID ORDER STATUS COVID19: NORMAL
CREAT SERPL-MCNC: 0.7 MG/DL (ref 0.5–1.4)
DAT C3D-SP REAG RBC QL: ABNORMAL
DAT IGG-SP REAG RBC QL: ABNORMAL
EOSINOPHIL # BLD AUTO: 0.11 K/UL (ref 0–0.51)
EOSINOPHIL NFR BLD: 1.1 % (ref 0–6.9)
ERYTHROCYTE [DISTWIDTH] IN BLOOD BY AUTOMATED COUNT: 61.5 FL (ref 35.9–50)
ERYTHROCYTE [DISTWIDTH] IN BLOOD BY AUTOMATED COUNT: 62.5 FL (ref 35.9–50)
GLOBULIN SER CALC-MCNC: 4.8 G/DL (ref 1.9–3.5)
GLUCOSE BLD-MCNC: 108 MG/DL (ref 65–99)
GLUCOSE BLD-MCNC: 109 MG/DL (ref 65–99)
GLUCOSE BLD-MCNC: 161 MG/DL (ref 65–99)
GLUCOSE BLD-MCNC: 301 MG/DL (ref 65–99)
GLUCOSE SERPL-MCNC: 149 MG/DL (ref 65–99)
HCT VFR BLD AUTO: 28.7 % (ref 42–52)
HCT VFR BLD AUTO: 30.2 % (ref 42–52)
HGB BLD-MCNC: 8.8 G/DL (ref 14–18)
HGB BLD-MCNC: 9.4 G/DL (ref 14–18)
IMM GRANULOCYTES # BLD AUTO: 0.16 K/UL (ref 0–0.11)
IMM GRANULOCYTES NFR BLD AUTO: 1.6 % (ref 0–0.9)
LYMPHOCYTES # BLD AUTO: 0.76 K/UL (ref 1–4.8)
LYMPHOCYTES NFR BLD: 7.7 % (ref 22–41)
MAGNESIUM SERPL-MCNC: 2 MG/DL (ref 1.5–2.5)
MCH RBC QN AUTO: 29.5 PG (ref 27–33)
MCH RBC QN AUTO: 30.4 PG (ref 27–33)
MCHC RBC AUTO-ENTMCNC: 30.7 G/DL (ref 33.7–35.3)
MCHC RBC AUTO-ENTMCNC: 31.1 G/DL (ref 33.7–35.3)
MCV RBC AUTO: 96.3 FL (ref 81.4–97.8)
MCV RBC AUTO: 97.7 FL (ref 81.4–97.8)
MONOCYTES # BLD AUTO: 0.65 K/UL (ref 0–0.85)
MONOCYTES NFR BLD AUTO: 6.6 % (ref 0–13.4)
NEUTROPHILS # BLD AUTO: 8.17 K/UL (ref 1.82–7.42)
NEUTROPHILS NFR BLD: 82.3 % (ref 44–72)
NRBC # BLD AUTO: 0 K/UL
NRBC BLD-RTO: 0 /100 WBC
PHOSPHATE SERPL-MCNC: 2.9 MG/DL (ref 2.5–4.5)
PLATELET # BLD AUTO: 169 K/UL (ref 164–446)
PLATELET # BLD AUTO: 232 K/UL (ref 164–446)
PMV BLD AUTO: 10.2 FL (ref 9–12.9)
PMV BLD AUTO: 9.7 FL (ref 9–12.9)
POTASSIUM SERPL-SCNC: 4.4 MMOL/L (ref 3.6–5.5)
PRODUCT TYPE UPROD: ABNORMAL
PRODUCT TYPE UPROD: NORMAL
PROT SERPL-MCNC: 7 G/DL (ref 6–8.2)
RBC # BLD AUTO: 2.98 M/UL (ref 4.7–6.1)
RBC # BLD AUTO: 3.09 M/UL (ref 4.7–6.1)
RH BLD: ABNORMAL
SARS-COV-2 RDRP RESP QL NAA+PROBE: NOTDETECTED
SODIUM SERPL-SCNC: 131 MMOL/L (ref 135–145)
SPECIMEN SOURCE: NORMAL
UNIT STATUS USTAT: ABNORMAL
UNIT STATUS USTAT: NORMAL
WBC # BLD AUTO: 15.3 K/UL (ref 4.8–10.8)
WBC # BLD AUTO: 9.9 K/UL (ref 4.8–10.8)

## 2020-11-12 PROCEDURE — A9270 NON-COVERED ITEM OR SERVICE: HCPCS | Performed by: STUDENT IN AN ORGANIZED HEALTH CARE EDUCATION/TRAINING PROGRAM

## 2020-11-12 PROCEDURE — 700102 HCHG RX REV CODE 250 W/ 637 OVERRIDE(OP): Performed by: STUDENT IN AN ORGANIZED HEALTH CARE EDUCATION/TRAINING PROGRAM

## 2020-11-12 PROCEDURE — 94760 N-INVAS EAR/PLS OXIMETRY 1: CPT

## 2020-11-12 PROCEDURE — L1830 KO IMMOB CANVAS LONG PRE OTS: HCPCS | Performed by: ORTHOPAEDIC SURGERY

## 2020-11-12 PROCEDURE — 3E0T3BZ INTRODUCTION OF ANESTHETIC AGENT INTO PERIPHERAL NERVES AND PLEXI, PERCUTANEOUS APPROACH: ICD-10-PCS | Performed by: ANESTHESIOLOGY

## 2020-11-12 PROCEDURE — 36415 COLL VENOUS BLD VENIPUNCTURE: CPT

## 2020-11-12 PROCEDURE — 700105 HCHG RX REV CODE 258: Performed by: ANESTHESIOLOGY

## 2020-11-12 PROCEDURE — 64418 NJX AA&/STRD SPRSCAP NRV: CPT | Performed by: ORTHOPAEDIC SURGERY

## 2020-11-12 PROCEDURE — 700111 HCHG RX REV CODE 636 W/ 250 OVERRIDE (IP): Performed by: ANESTHESIOLOGY

## 2020-11-12 PROCEDURE — 500881 HCHG PACK, EXTREMITY: Performed by: ORTHOPAEDIC SURGERY

## 2020-11-12 PROCEDURE — 99232 SBSQ HOSP IP/OBS MODERATE 35: CPT | Performed by: HOSPITALIST

## 2020-11-12 PROCEDURE — U0004 COV-19 TEST NON-CDC HGH THRU: HCPCS

## 2020-11-12 PROCEDURE — 160036 HCHG PACU - EA ADDL 30 MINS PHASE I: Performed by: ORTHOPAEDIC SURGERY

## 2020-11-12 PROCEDURE — 770006 HCHG ROOM/CARE - MED/SURG/GYN SEMI*

## 2020-11-12 PROCEDURE — 88311 DECALCIFY TISSUE: CPT

## 2020-11-12 PROCEDURE — 86850 RBC ANTIBODY SCREEN: CPT

## 2020-11-12 PROCEDURE — 160035 HCHG PACU - 1ST 60 MINS PHASE I: Performed by: ORTHOPAEDIC SURGERY

## 2020-11-12 PROCEDURE — 160028 HCHG SURGERY MINUTES - 1ST 30 MINS LEVEL 3: Performed by: ORTHOPAEDIC SURGERY

## 2020-11-12 PROCEDURE — 160039 HCHG SURGERY MINUTES - EA ADDL 1 MIN LEVEL 3: Performed by: ORTHOPAEDIC SURGERY

## 2020-11-12 PROCEDURE — 82962 GLUCOSE BLOOD TEST: CPT | Mod: 91

## 2020-11-12 PROCEDURE — 160048 HCHG OR STATISTICAL LEVEL 1-5: Performed by: ORTHOPAEDIC SURGERY

## 2020-11-12 PROCEDURE — A9270 NON-COVERED ITEM OR SERVICE: HCPCS | Performed by: INTERNAL MEDICINE

## 2020-11-12 PROCEDURE — 0Y6D0Z1 DETACHMENT AT LEFT UPPER LEG, HIGH, OPEN APPROACH: ICD-10-PCS | Performed by: ORTHOPAEDIC SURGERY

## 2020-11-12 PROCEDURE — 700101 HCHG RX REV CODE 250: Performed by: ANESTHESIOLOGY

## 2020-11-12 PROCEDURE — 160009 HCHG ANES TIME/MIN: Performed by: ORTHOPAEDIC SURGERY

## 2020-11-12 PROCEDURE — 84100 ASSAY OF PHOSPHORUS: CPT

## 2020-11-12 PROCEDURE — 500054 HCHG BANDAGE, ELASTIC 6: Performed by: ORTHOPAEDIC SURGERY

## 2020-11-12 PROCEDURE — 501838 HCHG SUTURE GENERAL: Performed by: ORTHOPAEDIC SURGERY

## 2020-11-12 PROCEDURE — 700102 HCHG RX REV CODE 250 W/ 637 OVERRIDE(OP): Performed by: ANESTHESIOLOGY

## 2020-11-12 PROCEDURE — 64447 NJX AA&/STRD FEMORAL NRV IMG: CPT | Performed by: ORTHOPAEDIC SURGERY

## 2020-11-12 PROCEDURE — 80053 COMPREHEN METABOLIC PANEL: CPT

## 2020-11-12 PROCEDURE — 83735 ASSAY OF MAGNESIUM: CPT

## 2020-11-12 PROCEDURE — 86901 BLOOD TYPING SEROLOGIC RH(D): CPT

## 2020-11-12 PROCEDURE — 94640 AIRWAY INHALATION TREATMENT: CPT

## 2020-11-12 PROCEDURE — 88307 TISSUE EXAM BY PATHOLOGIST: CPT

## 2020-11-12 PROCEDURE — 86880 COOMBS TEST DIRECT: CPT

## 2020-11-12 PROCEDURE — 160002 HCHG RECOVERY MINUTES (STAT): Performed by: ORTHOPAEDIC SURGERY

## 2020-11-12 PROCEDURE — 86870 RBC ANTIBODY IDENTIFICATION: CPT

## 2020-11-12 PROCEDURE — P9017 PLASMA 1 DONOR FRZ W/IN 8 HR: HCPCS

## 2020-11-12 PROCEDURE — P9016 RBC LEUKOCYTES REDUCED: HCPCS | Mod: 91

## 2020-11-12 PROCEDURE — 85027 COMPLETE CBC AUTOMATED: CPT

## 2020-11-12 PROCEDURE — 700111 HCHG RX REV CODE 636 W/ 250 OVERRIDE (IP): Performed by: STUDENT IN AN ORGANIZED HEALTH CARE EDUCATION/TRAINING PROGRAM

## 2020-11-12 PROCEDURE — 86922 COMPATIBILITY TEST ANTIGLOB: CPT

## 2020-11-12 PROCEDURE — 700111 HCHG RX REV CODE 636 W/ 250 OVERRIDE (IP): Performed by: INTERNAL MEDICINE

## 2020-11-12 PROCEDURE — 86900 BLOOD TYPING SEROLOGIC ABO: CPT

## 2020-11-12 PROCEDURE — C9803 HOPD COVID-19 SPEC COLLECT: HCPCS | Performed by: ORTHOPAEDIC SURGERY

## 2020-11-12 PROCEDURE — 500367 HCHG DRAIN KIT, HEMOVAC: Performed by: ORTHOPAEDIC SURGERY

## 2020-11-12 PROCEDURE — 85025 COMPLETE CBC W/AUTO DIFF WBC: CPT

## 2020-11-12 PROCEDURE — 36430 TRANSFUSION BLD/BLD COMPNT: CPT

## 2020-11-12 PROCEDURE — 700102 HCHG RX REV CODE 250 W/ 637 OVERRIDE(OP): Performed by: INTERNAL MEDICINE

## 2020-11-12 PROCEDURE — A9270 NON-COVERED ITEM OR SERVICE: HCPCS | Performed by: ANESTHESIOLOGY

## 2020-11-12 RX ORDER — PHENYLEPHRINE HYDROCHLORIDE 10 MG/ML
INJECTION, SOLUTION INTRAMUSCULAR; INTRAVENOUS; SUBCUTANEOUS PRN
Status: DISCONTINUED | OUTPATIENT
Start: 2020-11-12 | End: 2020-11-12 | Stop reason: SURG

## 2020-11-12 RX ORDER — MEPERIDINE HYDROCHLORIDE 25 MG/ML
12.5 INJECTION INTRAMUSCULAR; INTRAVENOUS; SUBCUTANEOUS
Status: DISCONTINUED | OUTPATIENT
Start: 2020-11-12 | End: 2020-11-12 | Stop reason: HOSPADM

## 2020-11-12 RX ORDER — LIDOCAINE HYDROCHLORIDE 20 MG/ML
INJECTION, SOLUTION EPIDURAL; INFILTRATION; INTRACAUDAL; PERINEURAL PRN
Status: DISCONTINUED | OUTPATIENT
Start: 2020-11-12 | End: 2020-11-12 | Stop reason: SURG

## 2020-11-12 RX ORDER — SODIUM CHLORIDE, SODIUM LACTATE, POTASSIUM CHLORIDE, CALCIUM CHLORIDE 600; 310; 30; 20 MG/100ML; MG/100ML; MG/100ML; MG/100ML
INJECTION, SOLUTION INTRAVENOUS
Status: DISCONTINUED | OUTPATIENT
Start: 2020-11-12 | End: 2020-11-12 | Stop reason: SURG

## 2020-11-12 RX ORDER — ONDANSETRON 2 MG/ML
4 INJECTION INTRAMUSCULAR; INTRAVENOUS
Status: DISCONTINUED | OUTPATIENT
Start: 2020-11-12 | End: 2020-11-12 | Stop reason: HOSPADM

## 2020-11-12 RX ORDER — CEFAZOLIN SODIUM 1 G/3ML
INJECTION, POWDER, FOR SOLUTION INTRAMUSCULAR; INTRAVENOUS PRN
Status: DISCONTINUED | OUTPATIENT
Start: 2020-11-12 | End: 2020-11-12 | Stop reason: SURG

## 2020-11-12 RX ORDER — ONDANSETRON 2 MG/ML
INJECTION INTRAMUSCULAR; INTRAVENOUS PRN
Status: DISCONTINUED | OUTPATIENT
Start: 2020-11-12 | End: 2020-11-12 | Stop reason: SURG

## 2020-11-12 RX ORDER — OXYCODONE HCL 5 MG/5 ML
5 SOLUTION, ORAL ORAL
Status: COMPLETED | OUTPATIENT
Start: 2020-11-12 | End: 2020-11-12

## 2020-11-12 RX ORDER — DIPHENHYDRAMINE HYDROCHLORIDE 50 MG/ML
12.5 INJECTION INTRAMUSCULAR; INTRAVENOUS
Status: DISCONTINUED | OUTPATIENT
Start: 2020-11-12 | End: 2020-11-12 | Stop reason: HOSPADM

## 2020-11-12 RX ORDER — SODIUM CHLORIDE, SODIUM GLUCONATE, SODIUM ACETATE, POTASSIUM CHLORIDE AND MAGNESIUM CHLORIDE 526; 502; 368; 37; 30 MG/100ML; MG/100ML; MG/100ML; MG/100ML; MG/100ML
INJECTION, SOLUTION INTRAVENOUS
Status: DISCONTINUED | OUTPATIENT
Start: 2020-11-12 | End: 2020-11-12 | Stop reason: SURG

## 2020-11-12 RX ORDER — CALCIUM CHLORIDE 100 MG/ML
INJECTION INTRAVENOUS; INTRAVENTRICULAR
Status: COMPLETED
Start: 2020-11-12 | End: 2020-11-12

## 2020-11-12 RX ORDER — ROPIVACAINE HYDROCHLORIDE 5 MG/ML
INJECTION, SOLUTION EPIDURAL; INFILTRATION; PERINEURAL PRN
Status: DISCONTINUED | OUTPATIENT
Start: 2020-11-12 | End: 2020-11-12 | Stop reason: SURG

## 2020-11-12 RX ORDER — HYDROMORPHONE HYDROCHLORIDE 1 MG/ML
0.2 INJECTION, SOLUTION INTRAMUSCULAR; INTRAVENOUS; SUBCUTANEOUS
Status: DISCONTINUED | OUTPATIENT
Start: 2020-11-12 | End: 2020-11-12 | Stop reason: HOSPADM

## 2020-11-12 RX ORDER — SODIUM CHLORIDE, SODIUM GLUCONATE, SODIUM ACETATE, POTASSIUM CHLORIDE AND MAGNESIUM CHLORIDE 526; 502; 368; 37; 30 MG/100ML; MG/100ML; MG/100ML; MG/100ML; MG/100ML
500 INJECTION, SOLUTION INTRAVENOUS CONTINUOUS
Status: DISCONTINUED | OUTPATIENT
Start: 2020-11-12 | End: 2020-11-12 | Stop reason: HOSPADM

## 2020-11-12 RX ORDER — MIDAZOLAM HYDROCHLORIDE 1 MG/ML
1 INJECTION INTRAMUSCULAR; INTRAVENOUS
Status: DISCONTINUED | OUTPATIENT
Start: 2020-11-12 | End: 2020-11-12 | Stop reason: HOSPADM

## 2020-11-12 RX ORDER — DEXTROSE MONOHYDRATE 25 G/50ML
50 INJECTION, SOLUTION INTRAVENOUS
Status: DISCONTINUED | OUTPATIENT
Start: 2020-11-12 | End: 2020-11-12

## 2020-11-12 RX ORDER — LIDOCAINE HYDROCHLORIDE 40 MG/ML
SOLUTION TOPICAL PRN
Status: DISCONTINUED | OUTPATIENT
Start: 2020-11-12 | End: 2020-11-12 | Stop reason: SURG

## 2020-11-12 RX ORDER — OXYCODONE HCL 5 MG/5 ML
10 SOLUTION, ORAL ORAL
Status: COMPLETED | OUTPATIENT
Start: 2020-11-12 | End: 2020-11-12

## 2020-11-12 RX ORDER — HYDROMORPHONE HYDROCHLORIDE 1 MG/ML
0.4 INJECTION, SOLUTION INTRAMUSCULAR; INTRAVENOUS; SUBCUTANEOUS
Status: DISCONTINUED | OUTPATIENT
Start: 2020-11-12 | End: 2020-11-12 | Stop reason: HOSPADM

## 2020-11-12 RX ORDER — HALOPERIDOL 5 MG/ML
1 INJECTION INTRAMUSCULAR
Status: DISCONTINUED | OUTPATIENT
Start: 2020-11-12 | End: 2020-11-12 | Stop reason: HOSPADM

## 2020-11-12 RX ORDER — CALCIUM CHLORIDE 100 MG/ML
INJECTION INTRAVENOUS; INTRAVENTRICULAR PRN
Status: DISCONTINUED | OUTPATIENT
Start: 2020-11-12 | End: 2020-11-12 | Stop reason: SURG

## 2020-11-12 RX ORDER — IPRATROPIUM BROMIDE AND ALBUTEROL SULFATE 2.5; .5 MG/3ML; MG/3ML
3 SOLUTION RESPIRATORY (INHALATION)
Status: DISCONTINUED | OUTPATIENT
Start: 2020-11-12 | End: 2020-11-12 | Stop reason: HOSPADM

## 2020-11-12 RX ORDER — HYDROMORPHONE HYDROCHLORIDE 1 MG/ML
1 INJECTION, SOLUTION INTRAMUSCULAR; INTRAVENOUS; SUBCUTANEOUS
Status: DISCONTINUED | OUTPATIENT
Start: 2020-11-12 | End: 2020-11-12 | Stop reason: HOSPADM

## 2020-11-12 RX ORDER — DEXAMETHASONE SODIUM PHOSPHATE 4 MG/ML
INJECTION, SOLUTION INTRA-ARTICULAR; INTRALESIONAL; INTRAMUSCULAR; INTRAVENOUS; SOFT TISSUE PRN
Status: DISCONTINUED | OUTPATIENT
Start: 2020-11-12 | End: 2020-11-12 | Stop reason: SURG

## 2020-11-12 RX ORDER — DEXTROSE MONOHYDRATE 25 G/50ML
50 INJECTION, SOLUTION INTRAVENOUS
Status: DISCONTINUED | OUTPATIENT
Start: 2020-11-12 | End: 2020-11-16 | Stop reason: HOSPADM

## 2020-11-12 RX ADMIN — SODIUM CHLORIDE, SODIUM GLUCONATE, SODIUM ACETATE, POTASSIUM CHLORIDE AND MAGNESIUM CHLORIDE: 526; 502; 368; 37; 30 INJECTION, SOLUTION INTRAVENOUS at 17:27

## 2020-11-12 RX ADMIN — Medication 400 MG: at 05:18

## 2020-11-12 RX ADMIN — LIDOCAINE HYDROCHLORIDE 100 MG: 20 INJECTION, SOLUTION EPIDURAL; INFILTRATION; INTRACAUDAL at 16:43

## 2020-11-12 RX ADMIN — CEFAZOLIN 3 G: 330 INJECTION, POWDER, FOR SOLUTION INTRAMUSCULAR; INTRAVENOUS at 16:37

## 2020-11-12 RX ADMIN — GLYCOPYRROLATE 1 CAPSULE: 15.6 CAPSULE RESPIRATORY (INHALATION) at 07:27

## 2020-11-12 RX ADMIN — CALCIUM CHLORIDE 1 G: 100 INJECTION INTRAVENOUS; INTRAVENTRICULAR at 18:32

## 2020-11-12 RX ADMIN — DOCUSATE SODIUM 50 MG AND SENNOSIDES 8.6 MG 2 TABLET: 8.6; 5 TABLET, FILM COATED ORAL at 05:18

## 2020-11-12 RX ADMIN — CEFAZOLIN SODIUM 2 G: 2 INJECTION, SOLUTION INTRAVENOUS at 13:34

## 2020-11-12 RX ADMIN — SODIUM CHLORIDE, SODIUM GLUCONATE, SODIUM ACETATE, POTASSIUM CHLORIDE AND MAGNESIUM CHLORIDE 500 ML: 526; 502; 368; 37; 30 INJECTION, SOLUTION INTRAVENOUS at 19:04

## 2020-11-12 RX ADMIN — GABAPENTIN 300 MG: 300 CAPSULE ORAL at 11:50

## 2020-11-12 RX ADMIN — AMIODARONE HYDROCHLORIDE 200 MG: 200 TABLET ORAL at 05:19

## 2020-11-12 RX ADMIN — FENTANYL CITRATE 100 MCG: 50 INJECTION, SOLUTION INTRAMUSCULAR; INTRAVENOUS at 16:50

## 2020-11-12 RX ADMIN — GABAPENTIN 300 MG: 300 CAPSULE ORAL at 05:18

## 2020-11-12 RX ADMIN — DEXAMETHASONE SODIUM PHOSPHATE 8 MG: 4 INJECTION, SOLUTION INTRA-ARTICULAR; INTRALESIONAL; INTRAMUSCULAR; INTRAVENOUS; SOFT TISSUE at 16:46

## 2020-11-12 RX ADMIN — SODIUM CHLORIDE, POTASSIUM CHLORIDE, SODIUM LACTATE AND CALCIUM CHLORIDE: 600; 310; 30; 20 INJECTION, SOLUTION INTRAVENOUS at 16:35

## 2020-11-12 RX ADMIN — CEFAZOLIN SODIUM 2 G: 2 INJECTION, SOLUTION INTRAVENOUS at 22:05

## 2020-11-12 RX ADMIN — LIDOCAINE HYDROCHLORIDE 4 ML: 40 SOLUTION TOPICAL at 16:43

## 2020-11-12 RX ADMIN — ALBUTEROL SULFATE 2 PUFF: 90 AEROSOL, METERED RESPIRATORY (INHALATION) at 07:54

## 2020-11-12 RX ADMIN — ONDANSETRON 4 MG: 2 INJECTION INTRAMUSCULAR; INTRAVENOUS at 18:18

## 2020-11-12 RX ADMIN — HYDROMORPHONE HYDROCHLORIDE 1 MG: 1 INJECTION, SOLUTION INTRAMUSCULAR; INTRAVENOUS; SUBCUTANEOUS at 11:58

## 2020-11-12 RX ADMIN — MIDAZOLAM HYDROCHLORIDE 1 MG: 1 INJECTION, SOLUTION INTRAMUSCULAR; INTRAVENOUS at 18:47

## 2020-11-12 RX ADMIN — IPRATROPIUM BROMIDE AND ALBUTEROL SULFATE 3 ML: .5; 3 SOLUTION RESPIRATORY (INHALATION) at 18:43

## 2020-11-12 RX ADMIN — HYDROMORPHONE HYDROCHLORIDE 1 MG: 1 INJECTION, SOLUTION INTRAMUSCULAR; INTRAVENOUS; SUBCUTANEOUS at 01:51

## 2020-11-12 RX ADMIN — Medication 200 MG: at 16:43

## 2020-11-12 RX ADMIN — GLYCOPYRROLATE 1 CAPSULE: 15.6 CAPSULE RESPIRATORY (INHALATION) at 20:00

## 2020-11-12 RX ADMIN — OXYCODONE HYDROCHLORIDE 10 MG: 5 SOLUTION ORAL at 19:32

## 2020-11-12 RX ADMIN — HEPARIN SODIUM 5000 UNITS: 5000 INJECTION, SOLUTION INTRAVENOUS; SUBCUTANEOUS at 22:05

## 2020-11-12 RX ADMIN — HYDROCODONE BITARTRATE AND ACETAMINOPHEN 1 TABLET: 10; 325 TABLET ORAL at 07:54

## 2020-11-12 RX ADMIN — SODIUM CHLORIDE, SODIUM GLUCONATE, SODIUM ACETATE, POTASSIUM CHLORIDE AND MAGNESIUM CHLORIDE: 526; 502; 368; 37; 30 INJECTION, SOLUTION INTRAVENOUS at 17:10

## 2020-11-12 RX ADMIN — PHENYLEPHRINE HYDROCHLORIDE 100 MCG: 10 INJECTION INTRAVENOUS at 16:45

## 2020-11-12 RX ADMIN — MIDAZOLAM HYDROCHLORIDE 1 MG: 1 INJECTION, SOLUTION INTRAMUSCULAR; INTRAVENOUS at 18:56

## 2020-11-12 RX ADMIN — CEFAZOLIN SODIUM 2 G: 2 INJECTION, SOLUTION INTRAVENOUS at 05:19

## 2020-11-12 RX ADMIN — ROSUVASTATIN CALCIUM 40 MG: 20 TABLET, FILM COATED ORAL at 21:28

## 2020-11-12 RX ADMIN — PROPOFOL 150 MG: 10 INJECTION, EMULSION INTRAVENOUS at 16:43

## 2020-11-12 RX ADMIN — MIDAZOLAM 2 MG: 1 INJECTION INTRAMUSCULAR; INTRAVENOUS at 16:50

## 2020-11-12 RX ADMIN — ROPIVACAINE HYDROCHLORIDE 25 ML: 5 INJECTION, SOLUTION EPIDURAL; INFILTRATION; PERINEURAL at 18:16

## 2020-11-12 ASSESSMENT — ENCOUNTER SYMPTOMS
COUGH: 0
CARDIOVASCULAR NEGATIVE: 1
BLURRED VISION: 0
FEVER: 0
GASTROINTESTINAL NEGATIVE: 1
NAUSEA: 0
PSYCHIATRIC NEGATIVE: 1
CHILLS: 0
RESPIRATORY NEGATIVE: 1
WEIGHT LOSS: 0
SORE THROAT: 0
FOCAL WEAKNESS: 0
VOMITING: 0
WEAKNESS: 0
MYALGIAS: 0
ABDOMINAL PAIN: 0
HEADACHES: 0
DIAPHORESIS: 0
SHORTNESS OF BREATH: 0
CONSTITUTIONAL NEGATIVE: 1
EYES NEGATIVE: 1
DIZZINESS: 0
BRUISES/BLEEDS EASILY: 0
NEUROLOGICAL NEGATIVE: 1
PALPITATIONS: 0
DEPRESSION: 0

## 2020-11-12 ASSESSMENT — LIFESTYLE VARIABLES: SUBSTANCE_ABUSE: 0

## 2020-11-12 ASSESSMENT — PAIN DESCRIPTION - PAIN TYPE: TYPE: SURGICAL PAIN

## 2020-11-12 ASSESSMENT — PAIN SCALES - GENERAL: PAIN_LEVEL: 7

## 2020-11-12 ASSESSMENT — FIBROSIS 4 INDEX: FIB4 SCORE: 2.1

## 2020-11-12 NOTE — PROGRESS NOTES
Pt A&Ox4.  C/o sharp pain to L BKA site, medicated per MAR, veraflo wound vac in place. Surgery today at 1545, pt verbalizes understanding of plan.   RLE dusky, wounds covered with dressing, CDI, daily dressing changes.   NPO, denies n/v. + bowel sounds, + flatus, LBM 11/11.  Saturating >90% on RA. Requested albuterol inhaler.   Pt ambulates MAX assist to edge of bed.  Updated on plan of care. Safety education provided. Bed locked in low. Call light within reach. Rounding in place.

## 2020-11-12 NOTE — PROGRESS NOTES
"Seen and examined.  Doing OK.    /56   Pulse 70   Temp 37.2 °C (98.9 °F) (Temporal)   Resp 17   Ht 1.956 m (6' 5\")   Wt (!) 147.2 kg (324 lb 7.5 oz)   SpO2 90%     Exam:  LLE wound vac to suction    #1 Left guillotine through knee amputation    Plan:  - To OR today for conversion to AKA  - NPO  "

## 2020-11-12 NOTE — PROGRESS NOTES
[facility] Medicine Daily Progress Note    Date of Service  11/12/2020    Chief Complaint  54 y.o. male admitted 10/29/2020 with worsening bilateral leg wounds    Hospital Course  Patient is a 54 year old male with a history of  DM, a. fib (on  Chronic anticoagulation), CAD (s/p CABG), SHASHANK, previous nicotine use and COPD who presented with a chief complaint of worsening bilateral lower extremity swelling,and pain. His symptoms had been worseing for several weeks prior to admission.  He was admitted for treatment of suspected necrotizing cellulitis and on 11/1/20 he was transferred to the ICU for sepsis with hypotension, hyponatremia, acute renal failure, acute systolic heart failure and hypoxia.  He was found to have group A strep bacteremia with severe PAD.    On 11/4/20 he underwent a left sided guillotine through the knee amputation with Dr. Durbin.    Interval Problem Update  Plan to complete Left leg amputation this afternoon. Nursing reports decreased bleeding from vac overnight.  He reports bilateral leg pain 2/10, denies dizziness, no sob, no cough, bp on low side, no associated sx at this time - will continue to follow  axox3  Ros otherwise negative  Discussed with nursing and case mgt    Consultants/Specialty  Vascular surgery Johnson  Critical care  Orthopedic surgery Ramakrishna  ID  Cardiology    Code Status  Full Code    Disposition  Rehab when medically stable    Review of Systems  Review of Systems   Constitutional: Negative.  Negative for chills, diaphoresis, fever, malaise/fatigue and weight loss.   HENT: Negative.  Negative for sore throat.    Eyes: Negative.  Negative for blurred vision.   Respiratory: Negative.  Negative for cough and shortness of breath.    Cardiovascular: Negative.  Negative for chest pain, palpitations and leg swelling.   Gastrointestinal: Negative.  Negative for abdominal pain, nausea and vomiting.   Genitourinary: Negative.  Negative for dysuria.   Musculoskeletal: Positive for  joint pain. Negative for myalgias.   Skin: Negative.  Negative for itching and rash.   Neurological: Negative.  Negative for dizziness, focal weakness, weakness and headaches.   Endo/Heme/Allergies: Negative.  Does not bruise/bleed easily.   Psychiatric/Behavioral: Negative.  Negative for depression, substance abuse and suicidal ideas.   All other systems reviewed and are negative.       Physical Exam  Temp:  [36.3 °C (97.3 °F)-37.2 °C (98.9 °F)] 37.2 °C (98.9 °F)  Pulse:  [69-83] 70  Resp:  [16-18] 17  BP: ()/(48-66) 108/56  SpO2:  [90 %-98 %] 90 %    Physical Exam  Vitals signs and nursing note reviewed. Exam conducted with a chaperone present.   Constitutional:       General: He is not in acute distress.     Appearance: Normal appearance. He is not diaphoretic.   HENT:      Head: Normocephalic.      Nose: Nose normal.      Mouth/Throat:      Mouth: Mucous membranes are moist.   Eyes:      Pupils: Pupils are equal, round, and reactive to light.   Cardiovascular:      Rate and Rhythm: Normal rate and regular rhythm.      Pulses: Normal pulses.      Heart sounds: Normal heart sounds.   Pulmonary:      Effort: Pulmonary effort is normal.      Breath sounds: Normal breath sounds.   Abdominal:      General: Abdomen is flat. Bowel sounds are normal.      Palpations: Abdomen is soft.      Comments: Umbilical hernia reducible   Musculoskeletal: Normal range of motion.         General: No swelling or deformity.      Comments: R leg celluliltis  Left bka site with wound vac in place - with serosang discharge   Skin:     General: Skin is warm and dry.      Capillary Refill: Capillary refill takes less than 2 seconds.   Neurological:      General: No focal deficit present.      Mental Status: He is alert and oriented to person, place, and time.      Cranial Nerves: No cranial nerve deficit.   Psychiatric:         Mood and Affect: Mood normal.         Behavior: Behavior normal.         Fluids    Intake/Output Summary  (Last 24 hours) at 11/12/2020 1451  Last data filed at 11/12/2020 0421  Gross per 24 hour   Intake 480 ml   Output 250 ml   Net 230 ml       Laboratory  Recent Labs     11/10/20  0327 11/11/20  0554 11/12/20  0247   WBC 12.1* 10.3 9.9   RBC 3.37* 2.93* 2.98*   HEMOGLOBIN 9.8* 8.7* 8.8*   HEMATOCRIT 31.9* 27.9* 28.7*   MCV 94.7 95.2 96.3   MCH 29.1 29.7 29.5   MCHC 30.7* 31.2* 30.7*   RDW 61.1* 62.3* 62.5*   PLATELETCT 236 226 232   MPV 9.9 9.8 9.7     Recent Labs     11/10/20  0327 11/12/20  0247   SODIUM 131* 131*   POTASSIUM 4.3 4.4   CHLORIDE 94* 94*   CO2 34* 31   GLUCOSE 128* 149*   BUN 17 17   CREATININE 0.76 0.70   CALCIUM 8.2* 8.5     Recent Labs     11/11/20  1516   INR 1.17*               Imaging  EC-ECHOCARDIOGRAM LTD W/ CONT   Final Result      DX-CHEST-LIMITED (1 VIEW)   Final Result      1.  Right internal jugular catheter is been placed and appears appropriately located      2.  Moderate pulmonary edema      3.  Enlarged cardiac silhouette      CT-EXTREMITY, LOWER W/O RIGHT   Final Result      1.  No discrete fluid collection is identified to suggest abscess.      2.  Diffuse subcutaneous edema and overlying skin thickening with venous varicosities.      3.  Atherosclerosis.      4.  Diffuse muscle atrophy.      CT-EXTREMITY, LOWER W/O LEFT   Final Result      1.  Left leg cellulitis without abscess or soft tissue gas      2.  Small vessel atherosclerotic plaque      3.  osteoarthritis of the left knee and left midfoot      4.  Muscular atrophy      EC-ECHOCARDIOGRAM COMPLETE W/O CONT   Final Result      DX-CHEST-PORTABLE (1 VIEW)   Final Result         1.  Pulmonary edema and/or infiltrates are identified, which are stable since the prior exam.   2.  Cardiomegaly      US-EXTREMITY ARTERY LOWER BILAT   Final Result      US-EXTREMITY VENOUS LOWER BILAT   Final Result      DX-TIBIA AND FIBULA RIGHT   Final Result         1.  No acute traumatic bony injury.   2.  Tricompartmental degenerative changes of  the knee   3.  Atherosclerosis      DX-TIBIA AND FIBULA LEFT   Final Result   Addendum 1 of 1   Addendum: Subtle clustered punctate lucencies lateral to the ankle are    seen which could represent tiny foci of soft tissue gas.      These findings were discussed with the patient's clinician, ELIZABETH HILARIO,    on 10/30/2020 12:34 AM.      Final      DX-CHEST-PORTABLE (1 VIEW)   Final Result         1.  Interstitial pulmonary parenchymal prominence, compatible with interstitial edema and/or infiltrates.   2.  Cardiomegaly           Assessment/Plan  * Cellulitis  Assessment & Plan  Severe cellulitis of bilateral lower extremities  Plan to continue cefazolin tentatively through 11/15/2020  Vascular surgery, Orthopedics and ID are following  Continue supportive care  s/p Left through knee amputation on 11/4  Revision surgery planned for today  Right LE cellulitis, continue abx and wound care, ortho following      Sepsis (Formerly [facility])  Assessment & Plan  Etiology bilateral cellulitis  resolving  continue Cefazolin, and per ID at least a 2-week antibiotic course due to the bacteremia, tentative end on 11/15/2020   ID following      Paroxysmal atrial fibrillation (HCC)- (present on admission)  Assessment & Plan  Rate controlled on metoprolol, amiodarone  [id number]: 4 points; HASBLED: 2 points.  Continue amiodarone  following    LINSEY (acute kidney injury) (Former[facility])  Assessment & Plan  Resolved  Etiology likely ATN from sepsis   following      Elevated troponin- (present on admission)  Assessment & Plan  no acute ischemic changes on EKG  Seen by cardiology  No chest pain  Likely related to demand ischemia, Type 2 MI, in the setting of heart failure and sepsis  11/2/20: Repeat ECHO - EF 50%, RVSP 40, mild AS    Heart failure with preserved ejection fraction, borderline, class IV ([facility])- (present on admission)  Assessment & Plan  Acute due to sepsis  Continue supportive care      Type 2 diabetes mellitus with complication, without long-term  current use of insulin (HCC)- (present on admission)  Assessment & Plan  HbA1C 7.8  Continue ISS  Continue hypoglycemia protocol    COPD (chronic obstructive pulmonary disease) (Formerly [facility])  Assessment & Plan  No sign of acute exacerbation  Currently stable on RA  Continue to follow    Hyponatremia  Assessment & Plan  Mild  In stable range  following         VTE prophylaxis: heparin

## 2020-11-12 NOTE — CARE PLAN
Problem: Safety  Goal: Will remain free from injury  Outcome: PROGRESSING AS EXPECTED     Problem: Safety  Goal: Will remain free from falls  Outcome: PROGRESSING AS EXPECTED     Problem: Infection  Goal: Will remain free from infection  Outcome: PROGRESSING AS EXPECTED     Problem: Venous Thromboembolism (VTW)/Deep Vein Thrombosis (DVT) Prevention:  Goal: Patient will participate in Venous Thrombosis (VTE)/Deep Vein Thrombosis (DVT)Prevention Measures  Outcome: PROGRESSING AS EXPECTED

## 2020-11-12 NOTE — PROGRESS NOTES
Patient will be transferred from [facility] service starting 0700 on 11/12/2020. For more information please see H&P, Progress Notes, and Consult Notes for more details.    Problem Representation:   54 y.o. male w/ CAD/CABG 2019, Afib, COPD, DM, and PAD who presented 10/29/2020 with LLE cellulitis, strep sepsis, and severe leg pain. Grew GAS and MSSA plus s/p L guillotine through knee amputation 11/04/2020 POD#7.    Current Summarized Plan:  Patient to have amputation completion on Left side 11/12/2020, holding anticoagulation and NPO at midnight.    Per ID, continue Cefazolin at least a 2-week antibiotic course due to the bacteremia, tentative end on 11/15/2020
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
## 2020-11-12 NOTE — PROGRESS NOTES
"   Orthopaedic Progress Note    Interval changes:  Patient doing well   Return to OR tomorrow for amp completion    NPO after midnight      ROS - Patient denies any new issues, except per above.       /60   Pulse 71   Temp 36.4 °C (97.5 °F) (Temporal)   Resp 17   Ht 1.956 m (6' 5\")   Wt (!) 147.4 kg (325 lb)   SpO2 95%       Patient seen and examined  No acute distress  Breathing non labored  RRR  LLE vac dressing partially pulled off, new drape and track pad placed, seal returned without leak, no contracture noted.       Recent Labs     11/09/20  0345 11/10/20  0327 11/11/20  0554   WBC 13.1* 12.1* 10.3   RBC 3.95* 3.37* 2.93*   HEMOGLOBIN 11.5* 9.8* 8.7*   HEMATOCRIT 37.5* 31.9* 27.9*   MCV 94.9 94.7 95.2   MCH 29.1 29.1 29.7   MCHC 30.7* 30.7* 31.2*   RDW 60.9* 61.1* 62.3*   PLATELETCT 224 236 226   MPV 9.4 9.9 9.8       Active Hospital Problems    Diagnosis   • Right-sided heart failure ([facility]) [I50.810]     Priority: High   • Sepsis ([facility]) [A41.9]     Priority: High   • Cellulitis [L03.90]     Priority: High   • High anion gap metabolic acidosis [E87.2]     Priority: Medium   • Supratherapeutic INR [R79.1]     Priority: Medium   • Paroxysmal atrial fibrillation ([facility]) [I48.0]     Priority: Medium   • LINSEY (acute kidney injury) ([facility]) [N17.9]     Priority: Medium   • Polycythemia [D75.1]     Priority: Low   • Elevated troponin [R77.8]     Priority: Low   • Morbid obesity with BMI of 40.0-44.9, adult ([facility]) [E66.01, Z68.41]     Priority: Low   • Heart failure with preserved ejection fraction, borderline, class IV ([facility]) [I50.30]     Priority: Low   • Type 2 diabetes mellitus with complication, without long-term current use of insulin ([facility]) [E11.8]     Priority: Low   • COPD (chronic obstructive pulmonary disease) ([facility]) [J44.9]     Priority: Low   • Hyponatremia [E87.1]     Priority: Low   • Elevated LFTs [R79.89]       Assessment/Plan:  Patient doing well  OR Thursday   NPO mdnt tomorrow  POD#7 S/P Left " kelli through-knee amputation  Wt bearing status - NWB LLE  Wound care/Drains - vac in place   Future Procedures - This thursday for amp completion  Sutures/Staples out- 14-21 day post definitive amputation  PT/OT-initiated  Antibiotics: ancef 2g IV q8, zyvox 600mg po BID  DVT Prophylaxis- TEDS/SCDs/Foot pumps  Ceballos-none  Case Coordination for Discharge Planning - Disposition rehab

## 2020-11-13 LAB
GLUCOSE BLD-MCNC: 155 MG/DL (ref 65–99)
GLUCOSE BLD-MCNC: 158 MG/DL (ref 65–99)
GLUCOSE BLD-MCNC: 169 MG/DL (ref 65–99)
GLUCOSE BLD-MCNC: 199 MG/DL (ref 65–99)
PATHOLOGY CONSULT NOTE: NORMAL

## 2020-11-13 PROCEDURE — 700111 HCHG RX REV CODE 636 W/ 250 OVERRIDE (IP): Performed by: INTERNAL MEDICINE

## 2020-11-13 PROCEDURE — 700102 HCHG RX REV CODE 250 W/ 637 OVERRIDE(OP): Performed by: STUDENT IN AN ORGANIZED HEALTH CARE EDUCATION/TRAINING PROGRAM

## 2020-11-13 PROCEDURE — 99232 SBSQ HOSP IP/OBS MODERATE 35: CPT | Performed by: HOSPITALIST

## 2020-11-13 PROCEDURE — 700111 HCHG RX REV CODE 636 W/ 250 OVERRIDE (IP): Performed by: STUDENT IN AN ORGANIZED HEALTH CARE EDUCATION/TRAINING PROGRAM

## 2020-11-13 PROCEDURE — 770006 HCHG ROOM/CARE - MED/SURG/GYN SEMI*

## 2020-11-13 PROCEDURE — A9270 NON-COVERED ITEM OR SERVICE: HCPCS | Performed by: STUDENT IN AN ORGANIZED HEALTH CARE EDUCATION/TRAINING PROGRAM

## 2020-11-13 PROCEDURE — 99232 SBSQ HOSP IP/OBS MODERATE 35: CPT | Performed by: NURSE PRACTITIONER

## 2020-11-13 PROCEDURE — 97535 SELF CARE MNGMENT TRAINING: CPT | Mod: CO

## 2020-11-13 PROCEDURE — 97110 THERAPEUTIC EXERCISES: CPT | Mod: CQ

## 2020-11-13 PROCEDURE — 94640 AIRWAY INHALATION TREATMENT: CPT

## 2020-11-13 PROCEDURE — 82962 GLUCOSE BLOOD TEST: CPT | Mod: 91

## 2020-11-13 PROCEDURE — 97530 THERAPEUTIC ACTIVITIES: CPT | Mod: CQ

## 2020-11-13 RX ADMIN — GABAPENTIN 300 MG: 300 CAPSULE ORAL at 16:24

## 2020-11-13 RX ADMIN — HEPARIN SODIUM 5000 UNITS: 5000 INJECTION, SOLUTION INTRAVENOUS; SUBCUTANEOUS at 22:16

## 2020-11-13 RX ADMIN — GABAPENTIN 300 MG: 300 CAPSULE ORAL at 12:42

## 2020-11-13 RX ADMIN — CEFAZOLIN SODIUM 2 G: 2 INJECTION, SOLUTION INTRAVENOUS at 13:34

## 2020-11-13 RX ADMIN — HYDROCODONE BITARTRATE AND ACETAMINOPHEN 1 TABLET: 10; 325 TABLET ORAL at 10:16

## 2020-11-13 RX ADMIN — GLYCOPYRROLATE 1 CAPSULE: 15.6 CAPSULE RESPIRATORY (INHALATION) at 07:09

## 2020-11-13 RX ADMIN — HYDROCODONE BITARTRATE AND ACETAMINOPHEN 1 TABLET: 10; 325 TABLET ORAL at 22:20

## 2020-11-13 RX ADMIN — HYDROCODONE BITARTRATE AND ACETAMINOPHEN 1 TABLET: 10; 325 TABLET ORAL at 04:51

## 2020-11-13 RX ADMIN — GABAPENTIN 300 MG: 300 CAPSULE ORAL at 04:51

## 2020-11-13 RX ADMIN — DOCUSATE SODIUM 50 MG AND SENNOSIDES 8.6 MG 2 TABLET: 8.6; 5 TABLET, FILM COATED ORAL at 04:51

## 2020-11-13 RX ADMIN — Medication 400 MG: at 04:51

## 2020-11-13 RX ADMIN — CEFAZOLIN SODIUM 2 G: 2 INJECTION, SOLUTION INTRAVENOUS at 22:16

## 2020-11-13 RX ADMIN — CEFAZOLIN SODIUM 2 G: 2 INJECTION, SOLUTION INTRAVENOUS at 04:51

## 2020-11-13 RX ADMIN — HEPARIN SODIUM 5000 UNITS: 5000 INJECTION, SOLUTION INTRAVENOUS; SUBCUTANEOUS at 13:34

## 2020-11-13 RX ADMIN — ALBUTEROL SULFATE 2 PUFF: 90 AEROSOL, METERED RESPIRATORY (INHALATION) at 18:55

## 2020-11-13 RX ADMIN — HYDROMORPHONE HYDROCHLORIDE 1 MG: 1 INJECTION, SOLUTION INTRAMUSCULAR; INTRAVENOUS; SUBCUTANEOUS at 14:50

## 2020-11-13 RX ADMIN — GLYCOPYRROLATE 1 CAPSULE: 15.6 CAPSULE RESPIRATORY (INHALATION) at 19:53

## 2020-11-13 RX ADMIN — AMIODARONE HYDROCHLORIDE 200 MG: 200 TABLET ORAL at 04:51

## 2020-11-13 RX ADMIN — HYDROMORPHONE HYDROCHLORIDE 1 MG: 1 INJECTION, SOLUTION INTRAMUSCULAR; INTRAVENOUS; SUBCUTANEOUS at 18:55

## 2020-11-13 RX ADMIN — HYDROCODONE BITARTRATE AND ACETAMINOPHEN 1 TABLET: 10; 325 TABLET ORAL at 16:24

## 2020-11-13 RX ADMIN — HEPARIN SODIUM 5000 UNITS: 5000 INJECTION, SOLUTION INTRAVENOUS; SUBCUTANEOUS at 04:51

## 2020-11-13 RX ADMIN — HYDROMORPHONE HYDROCHLORIDE 1 MG: 1 INJECTION, SOLUTION INTRAMUSCULAR; INTRAVENOUS; SUBCUTANEOUS at 23:45

## 2020-11-13 ASSESSMENT — LIFESTYLE VARIABLES: SUBSTANCE_ABUSE: 0

## 2020-11-13 ASSESSMENT — PAIN DESCRIPTION - PAIN TYPE
TYPE: SURGICAL PAIN
TYPE: ACUTE PAIN
TYPE: SURGICAL PAIN

## 2020-11-13 ASSESSMENT — COGNITIVE AND FUNCTIONAL STATUS - GENERAL
SUGGESTED CMS G CODE MODIFIER MOBILITY: CN
HELP NEEDED FOR BATHING: A LOT
DRESSING REGULAR LOWER BODY CLOTHING: A LOT
EATING MEALS: A LITTLE
MOVING FROM LYING ON BACK TO SITTING ON SIDE OF FLAT BED: UNABLE
MOBILITY SCORE: 6
DAILY ACTIVITIY SCORE: 14
WALKING IN HOSPITAL ROOM: TOTAL
CLIMB 3 TO 5 STEPS WITH RAILING: TOTAL
TURNING FROM BACK TO SIDE WHILE IN FLAT BAD: UNABLE
TOILETING: A LOT
PERSONAL GROOMING: A LITTLE
DRESSING REGULAR UPPER BODY CLOTHING: A LOT
STANDING UP FROM CHAIR USING ARMS: TOTAL
SUGGESTED CMS G CODE MODIFIER DAILY ACTIVITY: CK
MOVING TO AND FROM BED TO CHAIR: UNABLE

## 2020-11-13 ASSESSMENT — ENCOUNTER SYMPTOMS
PSYCHIATRIC NEGATIVE: 1
DIZZINESS: 0
BLURRED VISION: 0
WEAKNESS: 0
EYES NEGATIVE: 1
COUGH: 0
HEADACHES: 0
SHORTNESS OF BREATH: 0
CHILLS: 0
PALPITATIONS: 0
DEPRESSION: 0
MYALGIAS: 0
FEVER: 0
FOCAL WEAKNESS: 0
VOMITING: 0
CONSTITUTIONAL NEGATIVE: 1
CARDIOVASCULAR NEGATIVE: 1
WEIGHT LOSS: 0
NEUROLOGICAL NEGATIVE: 1
DIAPHORESIS: 0
BRUISES/BLEEDS EASILY: 0
RESPIRATORY NEGATIVE: 1
ABDOMINAL PAIN: 0
NAUSEA: 0
SORE THROAT: 0
GASTROINTESTINAL NEGATIVE: 1

## 2020-11-13 ASSESSMENT — GAIT ASSESSMENTS: GAIT LEVEL OF ASSIST: UNABLE TO PARTICIPATE

## 2020-11-13 ASSESSMENT — FIBROSIS 4 INDEX: FIB4 SCORE: 2.88

## 2020-11-13 NOTE — THERAPY
Occupational Therapy  Daily Treatment     Patient Name: Joshua Medrano  Age:  54 y.o., Sex:  male  Medical Record #: [id number]  Today's Date: 11/13/2020       Precautions: Fall Risk  Comments: new L AKA 11/12    Assessment    Pt seen for OT tx. Continues to be limited by pain, decreased activity tolerance and generalized weakness impacting ability to complete ADLs and ADL transfers impacting ability to complete ADLs and ADL transfers independently. While seated pt's stump and R heel started to bleed. RN notified. Pt returned to supine. Will continue to benefit from OT services while in house.     Plan    Continue current treatment plan.    DC Equipment Recommendations: Unable to determine at this time  Discharge Recommendations: Recommend post-acute placement for additional occupational therapy services prior to discharge home       11/13/20 1132   Cognition    Cognition / Consciousness X   Comments pleasant and cooperative. easily distracted and talkative   Bed Mobility    Supine to Sit Moderate Assist   Sit to Supine Minimal Assist   Activities of Daily Living   Grooming Supervision;Seated   Upper Body Dressing Minimal Assist   Lower Body Dressing Maximal Assist   Toileting Maximal Assist   Functional Mobility   Sit to Stand Unable to Participate   Bed, Chair, Wheelchair Transfer Unable to Participate   Short Term Goals   Short Term Goal # 1 Pt will complete BSC transfer with modA using slide board if needed by discharge.   Goal Outcome # 1 Goal not met   Short Term Goal # 2 Pt will complete toileting with Johnie by discharge.   Goal Outcome # 2 Goal not met   Short Term Goal # 3 Pt will complete LE dressing with Johnie and use of AE by discharge.   Goal Outcome # 3 Goal not met   Anticipated Discharge Equipment and Recommendations   DC Equipment Recommendations Unable to determine at this time   Discharge Recommendations Recommend post-acute placement for additional occupational therapy services prior to discharge  home

## 2020-11-13 NOTE — ANESTHESIA PROCEDURE NOTES
Peripheral IV    Date/Time: 11/12/2020 5:00 PM  Performed by: Jun Pitts III, M.D.  Authorized by: Jun Pitts III, M.D.     Size:  16 G (long Jelco)  Laterality:  Left (hand)  Local Anesthetic:  None  Site Prep:  Chlorhexidine  Technique:  Direct puncture  Attempts:  1   Done under GA

## 2020-11-13 NOTE — ANESTHESIA TIME REPORT
Anesthesia Start and Stop Event Times     Date Time Event    11/12/2020 1625 Ready for Procedure     1635 Anesthesia Start     1840 Anesthesia Stop        Responsible Staff  11/12/20    Name Role Begin End    Jun Pitts III, M.D. Anesth 1635 1840        Preop Diagnosis (Free Text):  Pre-op Diagnosis     Left guillotine through knee amputation        Preop Diagnosis (Codes):    Post op Diagnosis  PVD (peripheral vascular disease) (HCC)  Left leg necrotizing cellulitis    Premium Reason  K. Alert    Comments: Emergency case, post operative fascia iliaca femoral nerve block for post op pain control under GA

## 2020-11-13 NOTE — ADDENDUM NOTE
Addendum  created 11/13/20 0812 by Jun Pitts III, M.D.    Child order released for a procedure order, Clinical Note Signed, Intraprocedure Blocks edited, LDA created via procedure documentation

## 2020-11-13 NOTE — HEART FAILURE PROGRAM
Current plan is for patient to discharge to SNF closer to his residence in [place] likely not to happen immediately as patient had an AKA today.    Appropriately, there are no f/u appointments scheduled.    Thank you, Taylor Cardio RN Navigator [phone]

## 2020-11-13 NOTE — OR NURSING
The pt is awake and oriented. Respirations are regular and easy. Pain is controlled, the pt is comfortable. Dressing dry and intact.Hemovac patent draining Pt voided.

## 2020-11-13 NOTE — THERAPY
Physical Therapy   Daily Treatment     Patient Name: Joshua Medrano  Age:  54 y.o., Sex:  male  Medical Record #: [id number]  Today's Date: 11/13/2020     Precautions: Fall Risk    Assessment    Pt presenting more motivated to participate. Pt able to move B LE's w/out assist but needed assist w/ trunk to get to sitting EOB. Pt is able to maintain balance EOB despite stating he felt off because it felt like his L foot was still there. Pt unable to sequence seated scooting for pre SB transfer training. Pts incision on the L and R heel began bleeding so pt laid back down and RN notified.    Plan    Continue current treatment plan.    DC Equipment Recommendations: Unable to determine at this time  Discharge Recommendations: Recommend post-acute placement for additional physical therapy services prior to discharge home      Subjective    Pt pleasant and cooperative.     Objective       11/13/20 1226   Precautions   Precautions Fall Risk   Comments L AKA 11/12   Gait Analysis   Gait Level Of Assist Unable to Participate   Bed Mobility    Supine to Sit Moderate Assist   Sit to Supine Minimal Assist   Scooting Moderate Assist   Rolling   (sit pivot)   Comments Pt needing assist w/ trunk but able to bring LE's toward the EOB. Pt w/ poor sequencing of scooting.   Functional Mobility   Sit to Stand Unable to Participate   Bed, Chair, Wheelchair Transfer Unable to Participate   Mobility Pts dressing falling off on the L and R heel began to bleed.   Short Term Goals    Short Term Goal # 1 Pt will perform bed mobility with min A in 6 visits.   Goal Outcome # 1 goal not met   Short Term Goal # 2 Pt will sit, self supported at EOB with supervision x 5 min in 6 visits to prepare for slide board transfers.    Goal Outcome # 2 Goal not met   Short Term Goal # 3 Pt will scoot along EOB with mod A in 6 visits to prepare for slide board transfer.    Goal Outcome # 3 Goal not met

## 2020-11-13 NOTE — ANESTHESIA PREPROCEDURE EVALUATION
Relevant Problems   ANESTHESIA   (+) SHASHANK (obstructive sleep apnea)      PULMONARY   (+) COPD (chronic obstructive pulmonary disease) ([facility])   (+) Pneumonia due to infectious organism   (+) Shortness of breath      NEURO   (+) History of stroke      CARDIAC   (+) Acute exacerbation of CHF (congestive heart failure) ([facility])   (+) CAD (coronary artery disease)   (+) Essential hypertension   (+) NSTEMI (non-ST elevated myocardial infarction) ([facility])   (+) Paroxysmal atrial fibrillation ([facility])         (+) LINSEY (acute kidney injury) ([facility])   (+) CKD (chronic kidney disease), stage III      ENDO   (+) Type 2 diabetes mellitus with complication, without long-term current use of insulin ([facility])       Physical Exam    Airway   Mallampati: III  TM distance: >3 FB  Neck ROM: limited       Cardiovascular - normal exam  Rhythm: regular  Rate: normal  (-) murmur     Dental - normal exam           Pulmonary - normal exam  Breath sounds clear to auscultation     Abdominal   (+) obese     Neurological - normal exam         Other findings: Morbid obesity, multiple medical problems, sepsis, necrotizing cellulitis of Bilateral lower extremities            Anesthesia Plan    ASA 4- EMERGENT       Plan - general and peripheral nerve block     Peripheral nerve block will be post-op pain control  Airway plan will be ETT  (Sepsis, necrotizing cellulitis of BLE, post op fascia iliaca nerve block for post op pain control,  Anticipate large EBL per surgeon)      Induction: intravenous    Postoperative Plan: Postoperative administration of opioids is intended.    Pertinent diagnostic labs and testing reviewed    Informed Consent:    Anesthetic plan and risks discussed with patient.    Use of blood products discussed with: patient whom consented to blood products.

## 2020-11-13 NOTE — ANESTHESIA QCDR
2019 [facility] Clinical Data Registry (for Quality Improvement)     Postoperative nausea/vomiting risk protocol (Adult = 18 yrs and Pediatric 3-17 yrs)- (430 and 463)  General inhalation anesthetic (NOT TIVA) with PONV risk factors: Yes  Provision of anti-emetic therapy with at least 2 different classes of agents: Yes   Patient DID NOT receive anti-emetic therapy and reason is documented in Medical Record:  N/A    Multimodal Pain Management- (477)  Non-emergent surgery AND patient age >= 18: No  Use of Multimodal Pain Management, two or more drugs and/or interventions, NOT including systemic opioids:   Exception: Documented allergy to multiple classes of analgesics:     Smoking Abstinence (404)  Patient is current smoker (cigarette, pipe, e-cig, marijuanna): No  Elective Surgery:   Abstinence instructions provided prior to day of surgery:   Patient abstained from smoking on day of surgery:     Pre-Op Beta-Blocker in Isolated CABG (44)  Isolated CABG AND patient age >= 18: No  Beta-blocker admin within 24 hours of surgical incision:   Exception:of medical reason(s) for not administering beta blocker within 24 hours prior to surgical incision (e.g., not  indicated,other medical reason):     PACU assessment of acute postoperative pain prior to Anesthesia Care End- Applies to Patients Age = 18- (ABG7)  Initial PACU pain score is which of the following: < 7/10  Patient unable to report pain score: N/A    Post-anesthetic transfer of care checklist/protocol to PACU/ICU- (426 and 427)  Upon conclusion of case, patient transferred to which of the following locations: PACU/Non-ICU  Use of transfer checklist/protocol: Yes  Exclusion: Service Performed in Patient Hospital Room (and thus did not require transfer): N/A  Unplanned admission to ICU related to anesthesia service up through end of PACU care- (MD51)  Unplanned admission to ICU (not initially anticipated at anesthesia start time): No

## 2020-11-13 NOTE — PROGRESS NOTES
"   Orthopaedic Progress Note    Interval changes:  Patient doing well POD#1 S/P L AKA    ROS - Patient denies any new issues, except per above.       /64   Pulse 72   Temp 36.2 °C (97.2 °F) (Temporal)   Resp 16   Ht 1.956 m (6' 5\")   Wt (!) 154.7 kg (341 lb)   SpO2 96%     Patient seen and examined  No acute distress  Breathing non labored  RRR  Incision clean dry and well-approximated, proximal compartments soft, drain patent    Recent Labs     11/11/20  0554 11/12/20  0247 11/12/20  1753   WBC 10.3 9.9 15.3*   RBC 2.93* 2.98* 3.09*   HEMOGLOBIN 8.7* 8.8* 9.4*   HEMATOCRIT 27.9* 28.7* 30.2*   MCV 95.2 96.3 97.7   MCH 29.7 29.5 30.4   MCHC 31.2* 30.7* 31.1*   RDW 62.3* 62.5* 61.5*   PLATELETCT 226 232 169   MPV 9.8 9.7 10.2     Recent Labs     11/12/20  0247   SODIUM 131*   POTASSIUM 4.4   CHLORIDE 94*   CO2 31   GLUCOSE 149*   BUN 17     Recent Labs     11/12/20  0247   ALBUMIN 2.2*   TBILIRUBIN 0.3   ALKPHOSPHAT 68   TOTPROTEIN 7.0   ALTSGPT 7   ASTSGOT 23   CREATININE 0.70         Active Hospital Problems    Diagnosis   • Right-sided heart failure (HCC) [I50.810]     Priority: High   • Sepsis (HCC) [A41.9]     Priority: High   • Cellulitis [L03.90]     Priority: High   • Paroxysmal atrial fibrillation (HCC) [I48.0]     Priority: Medium   • LINSEY (acute kidney injury) ([facility]) [N17.9]     Priority: Medium   • Elevated troponin [R77.8]     Priority: Low   • Heart failure with preserved ejection fraction, borderline, class IV (HCC) [I50.30]     Priority: Low   • Type 2 diabetes mellitus with complication, without long-term current use of insulin (HCC) [E11.8]     Priority: Low   • COPD (chronic obstructive pulmonary disease) ([facility]) [J44.9]     Priority: Low   • Hyponatremia [E87.1]     Priority: Low       Assessment/Plan:  Patient doing well  POD#1 S/P L AKA  POD#9 S/P Left guillotine through-knee amputation  Wt bearing status - NWB LLE  Wound care/Drains - dressing changed bty RN/LPS team.  mL postop, " 50 last 8h, left in  Future Procedures - none anticipated  Sutures/Staples out- 14-21 day post definitive amputation  PT/OT-initiated  Antibiotics: ancef 2g IV q8  DVT Prophylaxis- TEDS/SCDs/Foot pumps, UFH 5Ku q8  Ceballos-none  Case Coordination for Discharge Planning - Disposition rehab likely, pending Tx reccs.  Recommend LMWH rather than UFH, ASA on DC/Xfr  Labs in AM  Sutures out 2-3 weeks postop with Dr. Durbin in Clinic
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
## 2020-11-13 NOTE — ANESTHESIA PROCEDURE NOTES
Airway    Date/Time: 11/12/2020 4:43 PM  Performed by: Jun Pitts III, M.D.  Authorized by: Jun Pitts III, M.D.     Location:  OR  Urgency:  Elective  Difficult Airway: No    Indications for Airway Management:  Anesthesia      Spontaneous Ventilation: absent    Sedation Level:  Deep  Preoxygenated: Yes    Patient Position:  Sniffing  Final Airway Type:  Endotracheal airway  Final Endotracheal Airway:  ETT  Cuffed: Yes    Technique Used for Successful ETT Placement:  Direct laryngoscopy    Insertion Site:  Oral  Blade Type:  Iyer  Laryngoscope Blade/Videolaryngoscope Blade Size:  3  ETT Size (mm):  8.0  Measured from:  Lips  ETT to Lips (cm):  24  Placement Verified by: auscultation and capnometry    Cormack-Lehane Classification:  Grade III - view of epiglottis only  Number of Attempts at Approach:  1

## 2020-11-13 NOTE — ANESTHESIA PROCEDURE NOTES
Peripheral Block    Date/Time: 11/12/2020 6:12 PM  Performed by: Jun Pitts III, M.D.  Authorized by: Jun Pitts III, M.D.     Patient Location:  OR  Start Time:  11/12/2020 6:12 PM  End Time:  11/12/2020 6:16 PM  Reason for Block: at surgeon's request and post-op pain management    patient identified, IV checked, site marked, risks and benefits discussed, surgical consent, monitors and equipment checked, pre-op evaluation and timeout performed    Patient Position:  Supine  Prep: ChloraPrep    Monitoring:  Heart rate, continuous pulse ox and cardiac monitor  Block Region:  Lower Extremity  Lower Extremity - Block Type:  FEMORAL nerve block, Supra-Inguinal Fascia Iliaca approach    Laterality:  Left  Procedures: ultrasound guided  Image captured, interpreted and electronically stored.  Local Infiltration:  Lidocaine  Strength:  1 %  Dose:  3 ml  Block Type:  Single-shot  Needle Length:  100mm  Needle Gauge:  21 G  Needle Localization:  Ultrasound guidance  Injection Assessment:  Negative aspiration for heme, no paresthesia on injection, incremental injection and local visualized surrounding nerve on ultrasound  Evidence of intravascular injection: No     Done under GA

## 2020-11-13 NOTE — ANESTHESIA POSTPROCEDURE EVALUATION
Patient: Joshua Medrano    Procedure Summary     Date: 11/12/20 Room / Location: [place] [id number] / SURGERY [facility]    Anesthesia Start: 1635 Anesthesia Stop: 1840    Procedure: AMPUTATION, ABOVE KNEE (Left Leg Upper) Diagnosis: (Left guillotine through knee amputation)    Surgeons: Johnathan Durbin M.D. Responsible Provider: Jun Pitts III, M.D.    Anesthesia Type: general, peripheral nerve block ASA Status: 4 - Emergent          Final Anesthesia Type: general, peripheral nerve block  Last vitals  BP   Blood Pressure: (!) 99/61    Temp   36.6 °C (97.9 °F)    Pulse   Pulse: 87   Resp   16    SpO2   99 %      Anesthesia Post Evaluation    Patient location during evaluation: PACU  Patient participation: complete - patient participated  Level of consciousness: awake and alert  Pain score: 7  Acute post op pain being well managed by recovery room staff    Airway patency: patent  Anesthetic complications: no  Cardiovascular status: hemodynamically stable  Respiratory status: acceptable  Hydration status: euvolemic    PONV: none           Nurse Pain Score: 7 (NPRS)

## 2020-11-13 NOTE — NON-PROVIDER
Progress/SOAP note    CC: L leg below knee amputation following traumatic encounter    HPI:   Mr. Medrano is a 55yo M with PMH of CABG in 2016, Paroxysmal A-fib on warfarin, HFpEF with EF 55%, COPD and DM T2 who is s/p above L knee amputation(revision) POD1. He has a Hemo vac on L knee draining sanguinous fluid. He has 10/10 pain on his AKA and describes it as radiating to the rest of the limb. The pain does not radiate is sharp and constant. His R Leg is covered with bandages for cellulitis with moderate edema and is darker today. He feels depressed, lonely and in extreme pain. He also seems to get overtly personal despite letting him know it would be inappropriate.   ROS is neg for chills, fevers, nausea, abd pain, numbness, tingling, SOB, CP, constipation and urination difficulty.     PMH:  Allergies: Erythromycin-swelling up on body, penicillin-blotches, Shellfish-lifethreatening.   Surgical Hx: CABG 4 yrs ago, skin cancer excised 4 yrs ago  Could not recall other medical conditions    Medications:   Medication Ordered Dose/Rate, Route, Frequency Last Action   amiodarone (Cordarone) tablet 200 mg 200 mg, PO, QAM Given, 200 mg at 11/13 0451   ceFAZolin in dextrose (ANCEF) IVPB premix 2 g 2 g, IV, Q8HRS Stopped, 11/13 0521   gabapentin (NEURONTIN) capsule 300 mg 300 mg, PO, TID Given, 300 mg at 11/13 0451   glycopyrrolate (Seebri) 15.6 mcg inhalation capsule 1 Cap 1 Cap, INH, BID (RT) Given, 1 Cap at 11/13 0709   heparin injection 5,000 Units 5,000 Units, SC, Q8HRS Given, 5,000 Units at 11/13 0451   insulin regular (HumuLIN R,NovoLIN R) injection (And Linked Group #1) 1-6 Units, SC, 4X/DAY ACHS Given, 1 Units at 11/13 0808   magnesium oxide (MAG-OX) tablet 400 mg 400 mg, PO, DAILY Given, 400 mg at 11/13 0451   rosuvastatin (CRESTOR) tablet 40 mg 40 mg, PO, QHS Given, 40 mg at 11/12 2128   senna-docusate (PERICOLACE or SENOKOT S) 8.6-50 MG per tablet 2 Tab (And Linked Group #2) 2 Tab, PO, BID Given, 2 Tab at 11/13  0451   PRN    Medication Ordered Dose/Rate, Route, Frequency Last Action   acetaminophen (TYLENOL) tablet 500 mg 500 mg, PO, Q6HRS PRN Ordered   albuterol inhaler 2 Puff 2 Puff, INH, Q6HRS PRN Given, 2 Puff at  0754   bisacodyl (DULCOLAX) suppository 10 mg (And Linked Group #2) 10 mg, AL, QDAY PRN Given, 10 mg at  1111   cloNIDine (CATAPRES) tablet 0.1 mg 0.1 mg, PO, Q6HRS PRN Ordered   dextrose 50% (D50W) injection 50 mL (And Linked Group #1) 50 mL, IV, Q15 MIN PRN Ordered   glucose 4 g chewable tablet 16 g (And Linked Group #1) 16 g, PO, Q15 MIN PRN Ordered   HYDROcodone/acetaminophen (NORCO)  MG per tablet 1 Tab 1 Tab, PO, Q6HRS PRN Given, 1 Tab at  1016   HYDROmorphone pf (DILAUDID) injection 0.5-1 mg 0.5-1 mg, IV, Q2HRS PRN Given, 1 mg at  1158   influenza vaccine quad injection 0.5 mL 0.5 mL, IM, Once PRN Ordered   ipratropium-albuterol (DUONEB) nebulizer solution 3 mL, NEBULIZATION, Q6HRS PRN (RT) Ordered   lidocaine (XYLOCAINE) 2 % injection 20 mL (Or Linked Group #3) 20 mL, TOP, QDAY PRN Ordered   lidocaine (XYLOCAINE) 4 % topical solution (Or Linked Group #3) No Dose/Rate, TOP, QDAY PRN Ordered   magnesium hydroxide (MILK OF MAGNESIA) suspension 30 mL (And Linked Group #2) 30 mL, PO, QDAY PRN Given, 30 mL at  0548   pneumococcal vaccine (PNEUMOVAX-23) injection 25 mcg 0.5 mL, IM, Once PRN Ordered   polyethylene glycol/lytes (MIRALAX) PACKET 1 Packet (And Linked Group #2) 1 Packet, PO, QDAY PRN Given, 1 Packet at  0549       FH: Has 10 siblings-3 : various issues. Mom had Parkinson-. Dad had an MI-age unknown.   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  with 2 older children. All healthy.  EtOH: Quit 20-30yrs ago. Used to drink 7 d/wk. Quantity unknown.   Smoking: Quit 2-3 yrs ago. Used to smoke 3/4packs/day for 21years.   Diet: Unhealthy, uncontrolled. Refused to provide details      Physical Exam:  GA: Well appearing  obese male in NAD.   VS:     36.2-36.7 36.3  36.3 (97.3)     Pulse 69-83 72-80  80    Respiration 17-18 16-18  16    Blood Pressure 96//60 98//59  98/48     Pulse Oximetry (%) 95-98 90-98  90          HEENT: NAAT, PEARLA, dry mouth and tongue  Cardiovascular:   S1 S2 heard. Normal rate and regular rhythm. No murmurs or gallops   Pulmonary:       Normal breath sounds. No wheezing, crackles or rhonchi  Chest:      Chest wall: extensive bruising around the R nipple   Abdominal:      General: BS+. Non-tender, nondistended. R umbilical hernia present on palpation  Musculoskeletal:        Extreme Tenderness on palpation around the L AKA. Normal ROM on all extremities. Hemo vac on the L AKA drains sanguinous fluid actively.   Skin:     Capillary Refill: Capillary refill takes less than 2 seconds.      Comments: LLE: s/p through knee amputation, dressing and sutures intact with few blood droplets. Hemo vac in place.   Right lower extremity swelling-Dark in appearance. No palpable crepitus. Leg wrapped in bandages. .    Neurological:      Mental Status: He is alert and oriented to person, place, and time. Mental status is at baseline.      Cranial Nerves: No cranial nerve deficit.      Motor: No weakness.   Psychiatric:         Behavior: Behavior normal.         Thought Content: Thought content normal.         Judgment: Judgment normal.         Labs: No new labs today. Review Pathology report when available     Ref. Range 11/9/2020 03:45 11/10/2020 03:27 11/11/2020 05:54 11/12/2020 02:47 11/12/2020 17:53   RBC Latest Ref Range: 4.70 - 6.10 M/uL 3.95 (L) 3.37 (L) 2.93 (L) 2.98 (L) 3.09 (L)   Hemoglobin Latest Ref Range: 14.0 - 18.0 g/dL 11.5 (L) 9.8 (L) 8.7 (L) 8.8 (L) 9.4 (L)   Hematocrit Latest Ref Range: 42.0 - 52.0 % 37.5 (L) 31.9 (L) 27.9 (L) 28.7 (L) 30.2 (L)        Ref. Range 11/9/2020 03:45 11/10/2020 03:27 11/11/2020 05:54 11/12/2020 02:47 11/12/2020 17:53   WBC Latest Ref Range: 4.8 - 10.8 K/uL 13.1 (H)  12.1 (H) 10.3 9.9 15.3 (H)        11/10/2020 03:27 11/11/2020 05:54 11/12/2020 02:47   Neutrophils-Polys 84.20 (H) 82.30 (H) 82.30 (H)        Ref. Range 11/10/2020 03:27 11/12/2020 02:47   Sodium Latest Ref Range: 135 - 145 mmol/L 131 (L) 131 (L)   Potassium Latest Ref Range: 3.6 - 5.5 mmol/L 4.3 4.4   Chloride Latest Ref Range: 96 - 112 mmol/L 94 (L) 94 (L)     Results for JARRETT WHITE (MRN [id number]) as of 11/13/2020 11:33   Ref. Range 11/11/2020 16:54 11/11/2020 20:37 11/12/2020 07:44 11/12/2020 11:48 11/12/2020 21:31   Glucose - Accu-Ck Latest Ref Range: 65 - 99 mg/dL 134 (H) 161 (H) 109 (H) 108 (H) 301 (H)     Results for JARRETT WHITE (MRN [id number]) as of 11/12/2020 11:38   11/11/2020 15:16   PT 15.3 (H)   INR 1.17 (H)       DX-CHEST-LIMITED (1 VIEW)   Final Result       1.  Right internal jugular catheter is been placed and appears appropriately located       2.  Moderate pulmonary edema       3.  Enlarged cardiac silhouette       CT-EXTREMITY, LOWER W/O RIGHT   Final Result       1.  No discrete fluid collection is identified to suggest abscess.       2.  Diffuse subcutaneous edema and overlying skin thickening with venous varicosities.       3.  Atherosclerosis.       4.  Diffuse muscle atrophy.       CT-EXTREMITY, LOWER W/O LEFT   Final Result       1.  Left leg cellulitis without abscess or soft tissue gas       2.  Small vessel atherosclerotic plaque       3.  osteoarthritis of the left knee and left midfoot       4.  Muscular atrophy       EC-ECHOCARDIOGRAM COMPLETE W/O CONT   Final Result       DX-CHEST-PORTABLE (1 VIEW)   Final Result           1.  Pulmonary edema and/or infiltrates are identified, which are stable since the prior exam.   2.  Cardiomegaly       US-EXTREMITY ARTERY LOWER BILAT   Final Result       US-EXTREMITY VENOUS LOWER BILAT   Final Result       DX-TIBIA AND FIBULA RIGHT   Final Result           1.  No acute traumatic bony injury.   2.  Tricompartmental degenerative changes  of the knee   3.  Atherosclerosis       DX-TIBIA AND FIBULA LEFT   Final Result   Addendum 1 of 1   Addendum: Subtle clustered punctate lucencies lateral to the ankle are    seen which could represent tiny foci of soft tissue gas.       These findings were discussed with the patient's clinician, ELIZABETH HILARIO,    on 10/30/2020 12:34 AM.       Final       DX-CHEST-PORTABLE (1 VIEW)   Final Result           1.  Interstitial pulmonary parenchymal prominence, compatible with interstitial edema and/or infiltrates.   2.  Cardiomegaly             A&P    1. L leg AKA pain ddx:  A.Phantom limb syndrome-presentation, extreme pain on palpation  B.Stump Ischemia-CAD, post-op. But well perfused with no ischemic changes.   C. Necrotizing fasciitis--check the pathology report, no crepitus or erythema.   D. Compartment syndrome-pain out of proportion on palpation of the stump  E. Osteomyelitis-No fever, chills but WBC elevated and diabetic. Less likely. Abx after culture if symptomatic.   F- rapidly progressive Diabetic ulcer? Review A1c, compliance with meds, regular endocrinology visits, diet control. Sugars elevated to 300 today and had two pizzas after surgery.     2. R leg cellulitis: has wound care, on Abx. Reports no fevers, chills or n/v. Darkening-progressive. Control diabetes, increase abx coverage after culture?    3.DM T2--Blood sugars elevated today--possibly due to lack of diet control and surgery.      5. Depression-due to amputation and pain. Recommend counseling if worse. Has a good appetite and family support.     4. Anemia: Better today. probably due to wound vac draining bloody fluids.    5. DVT prophylax-On Heparin    6. CAD req. CABG-Reports no chest pain or constant SOB. On coumadin--INR: 1.17.Diet control, promote exercise as tolerated. Review drug regimen.    7. PAF- Rate controlled with amiodarone. No CP or irreg rhythm or rate on exam.

## 2020-11-13 NOTE — PROGRESS NOTES
Assumed care of patient. Patient excentric and talking quickly with changes in subject. Patient currently denying pain and nausea. Left AKA with cdi gauze and ACE dressing. Left AKA hemovac is compressed with sang drainage. Right lower extremity is dusky, cool and also with cdi drsg. Patient seems unaware of severity of current situation; eating pizza, laughing regarding needing insulin and planning shooting outings once discharged. Patient denies seeing a need to change lifestyle despite education. No other needs at this time. Call light within reach.

## 2020-11-13 NOTE — PROGRESS NOTES
"[facility] Medicine Daily Progress Note    Date of Service  11/13/2020    Chief Complaint  54 y.o. male admitted 10/29/2020 with worsening bilateral leg wounds    Hospital Course  Patient is a 54 year old male with a history of  DM, a. fib (on  Chronic anticoagulation), CAD (s/p CABG), SHASHANK, previous nicotine use and COPD who presented with a chief complaint of worsening bilateral lower extremity swelling,and pain. His symptoms had been worseing for several weeks prior to admission.  He was admitted for treatment of suspected necrotizing cellulitis and on 11/1/20 he was transferred to the ICU for sepsis with hypotension, hyponatremia, acute renal failure, acute systolic heart failure and hypoxia.  He was found to have group A strep bacteremia with severe PAD.    On 11/4/20 he underwent a left sided guillotine through the knee amputation with Dr. Durbin.    Interval Problem Update  Per nursing he ordered and ate two pizzas after his surgery last night, has intermittently refused to work with PT.  This am he is cooperative, reports phantom limb pain \"where my ankle should be at 8/10).  He reports some depression but denies SI.  He states he will only consent to rehab or snf in [facility].  ROS otherwise negative  Discussed with nursing and case mgt    Consultants/Specialty  Vascular surgery Johnson  Critical care  Orthopedic surgery Walker  ID  Cardiology    Code Status  Full Code    Disposition  Rehab when medically stable    Review of Systems  Review of Systems   Constitutional: Negative.  Negative for chills, diaphoresis, fever, malaise/fatigue and weight loss.   HENT: Negative.  Negative for sore throat.    Eyes: Negative.  Negative for blurred vision.   Respiratory: Negative.  Negative for cough and shortness of breath.    Cardiovascular: Negative.  Negative for chest pain, palpitations and leg swelling.   Gastrointestinal: Negative.  Negative for abdominal pain, nausea and vomiting.   Genitourinary: Negative.  " Negative for dysuria.   Musculoskeletal: Positive for joint pain. Negative for myalgias.   Skin: Negative.  Negative for itching and rash.   Neurological: Negative.  Negative for dizziness, focal weakness, weakness and headaches.   Endo/Heme/Allergies: Negative.  Does not bruise/bleed easily.   Psychiatric/Behavioral: Negative.  Negative for depression, substance abuse and suicidal ideas.   All other systems reviewed and are negative.       Physical Exam  Temp:  [36.2 °C (97.2 °F)-37.3 °C (99.2 °F)] 36.4 °C (97.6 °F)  Pulse:  [72-97] 79  Resp:  [15-85] 16  BP: ()/(58-75) 105/58  SpO2:  [90 %-100 %] 97 %    Physical Exam  Vitals signs and nursing note reviewed. Exam conducted with a chaperone present.   Constitutional:       General: He is not in acute distress.     Appearance: Normal appearance. He is not diaphoretic.   HENT:      Head: Normocephalic.      Nose: Nose normal.      Mouth/Throat:      Mouth: Mucous membranes are moist.   Eyes:      Pupils: Pupils are equal, round, and reactive to light.   Cardiovascular:      Rate and Rhythm: Normal rate and regular rhythm.      Pulses: Normal pulses.      Heart sounds: Normal heart sounds.   Pulmonary:      Effort: Pulmonary effort is normal.      Breath sounds: Normal breath sounds.   Abdominal:      General: Abdomen is flat. Bowel sounds are normal.      Palpations: Abdomen is soft.      Comments: Umbilical hernia reducible   Musculoskeletal: Normal range of motion.         General: No swelling or deformity.      Comments: R leg cellulltis stabe  Left aka site cdi with hemovac in place - minimal output   Skin:     General: Skin is warm and dry.      Capillary Refill: Capillary refill takes less than 2 seconds.   Neurological:      General: No focal deficit present.      Mental Status: He is alert and oriented to person, place, and time.      Cranial Nerves: No cranial nerve deficit.   Psychiatric:         Mood and Affect: Mood normal.         Behavior:  Behavior normal.         Fluids    Intake/Output Summary (Last 24 hours) at 11/13/2020 1447  Last data filed at 11/13/2020 1200  Gross per 24 hour   Intake 4360 ml   Output 980 ml   Net 3380 ml       Laboratory  Recent Labs     11/11/20  0554 11/12/20  0247 11/12/20  1753   WBC 10.3 9.9 15.3*   RBC 2.93* 2.98* 3.09*   HEMOGLOBIN 8.7* 8.8* 9.4*   HEMATOCRIT 27.9* 28.7* 30.2*   MCV 95.2 96.3 97.7   MCH 29.7 29.5 30.4   MCHC 31.2* 30.7* 31.1*   RDW 62.3* 62.5* 61.5*   PLATELETCT 226 232 169   MPV 9.8 9.7 10.2     Recent Labs     11/12/20  0247   SODIUM 131*   POTASSIUM 4.4   CHLORIDE 94*   CO2 31   GLUCOSE 149*   BUN 17   CREATININE 0.70   CALCIUM 8.5     Recent Labs     11/11/20  1516   INR 1.17*               Imaging  EC-ECHOCARDIOGRAM LTD W/ CONT   Final Result      DX-CHEST-LIMITED (1 VIEW)   Final Result      1.  Right internal jugular catheter is been placed and appears appropriately located      2.  Moderate pulmonary edema      3.  Enlarged cardiac silhouette      CT-EXTREMITY, LOWER W/O RIGHT   Final Result      1.  No discrete fluid collection is identified to suggest abscess.      2.  Diffuse subcutaneous edema and overlying skin thickening with venous varicosities.      3.  Atherosclerosis.      4.  Diffuse muscle atrophy.      CT-EXTREMITY, LOWER W/O LEFT   Final Result      1.  Left leg cellulitis without abscess or soft tissue gas      2.  Small vessel atherosclerotic plaque      3.  osteoarthritis of the left knee and left midfoot      4.  Muscular atrophy      EC-ECHOCARDIOGRAM COMPLETE W/O CONT   Final Result      DX-CHEST-PORTABLE (1 VIEW)   Final Result         1.  Pulmonary edema and/or infiltrates are identified, which are stable since the prior exam.   2.  Cardiomegaly      US-EXTREMITY ARTERY LOWER BILAT   Final Result      US-EXTREMITY VENOUS LOWER BILAT   Final Result      DX-TIBIA AND FIBULA RIGHT   Final Result         1.  No acute traumatic bony injury.   2.  Tricompartmental degenerative  changes of the knee   3.  Atherosclerosis      DX-TIBIA AND FIBULA LEFT   Final Result   Addendum 1 of 1   Addendum: Subtle clustered punctate lucencies lateral to the ankle are    seen which could represent tiny foci of soft tissue gas.      These findings were discussed with the patient's clinician, ELIZABETH HILARIO,    on 10/30/2020 12:34 AM.      Final      DX-CHEST-PORTABLE (1 VIEW)   Final Result         1.  Interstitial pulmonary parenchymal prominence, compatible with interstitial edema and/or infiltrates.   2.  Cardiomegaly           Assessment/Plan  * Cellulitis  Assessment & Plan  Severe cellulitis of bilateral lower extremities  Plan to continue cefazolin tentatively through 11/15/2020  Vascular surgery following  Continue supportive care  s/p Left through knee amputation on 11/4 with follow up to AKA 11/12/20  Right LE cellulitis, continue abx and wound care  Ortho following      Sepsis ([facility])  Assessment & Plan  Etiology bilateral cellulitis  resolving  continue Cefazolin, and per ID at least a 2-week antibiotic course due to the bacteremia, tentative end on 11/15/2020   ID following      Paroxysmal atrial fibrillation ([facility])- (present on admission)  Assessment & Plan  Rate controlled on metoprolol, amiodarone  [id number]: 4 points; HASBLED: 2 points.  Continue amiodarone  following    LINSEY (acute kidney injury) ([facility])  Assessment & Plan  Resolved  Etiology likely ATN from sepsis   following      Elevated troponin- (present on admission)  Assessment & Plan  no acute ischemic changes on EKG  Seen by cardiology  No chest pain  Likely related to demand ischemia, Type 2 MI, in the setting of heart failure and sepsis  11/2/20: Repeat ECHO - EF 50%, RVSP 40, mild AS    Cigarette nicotine dependence with nicotine-induced disorder- (present on admission)  Assessment & Plan  Cessation discussed and recommended    Heart failure with preserved ejection fraction, borderline, class IV (HCC)- (present on  admission)  Assessment & Plan  Acute due to sepsis  Continue supportive care      Type 2 diabetes mellitus with complication, without long-term current use of insulin (HCC)- (present on admission)  Assessment & Plan  HbA1C 7.8  Continue ISS  Continue hypoglycemia protocol    COPD (chronic obstructive pulmonary disease) ([facility])  Assessment & Plan  No sign of acute exacerbation  Currently stable on RA  Continue to follow    Hyponatremia  Assessment & Plan  Mild  In stable range  following         VTE prophylaxis: heparin

## 2020-11-14 LAB
ANION GAP SERPL CALC-SCNC: 6 MMOL/L (ref 7–16)
ANISOCYTOSIS BLD QL SMEAR: ABNORMAL
BASOPHILS # BLD AUTO: 1.7 % (ref 0–1.8)
BASOPHILS # BLD: 0.19 K/UL (ref 0–0.12)
BUN SERPL-MCNC: 21 MG/DL (ref 8–22)
CALCIUM SERPL-MCNC: 8 MG/DL (ref 8.5–10.5)
CHLORIDE SERPL-SCNC: 95 MMOL/L (ref 96–112)
CO2 SERPL-SCNC: 29 MMOL/L (ref 20–33)
CREAT SERPL-MCNC: 0.73 MG/DL (ref 0.5–1.4)
EOSINOPHIL # BLD AUTO: 0.3 K/UL (ref 0–0.51)
EOSINOPHIL NFR BLD: 2.6 % (ref 0–6.9)
ERYTHROCYTE [DISTWIDTH] IN BLOOD BY AUTOMATED COUNT: 64.2 FL (ref 35.9–50)
GLUCOSE BLD-MCNC: 124 MG/DL (ref 65–99)
GLUCOSE BLD-MCNC: 165 MG/DL (ref 65–99)
GLUCOSE BLD-MCNC: 173 MG/DL (ref 65–99)
GLUCOSE BLD-MCNC: 196 MG/DL (ref 65–99)
GLUCOSE SERPL-MCNC: 204 MG/DL (ref 65–99)
HCT VFR BLD AUTO: 26.3 % (ref 42–52)
HGB BLD-MCNC: 8.2 G/DL (ref 14–18)
LYMPHOCYTES # BLD AUTO: 0.49 K/UL (ref 1–4.8)
LYMPHOCYTES NFR BLD: 4.3 % (ref 22–41)
MACROCYTES BLD QL SMEAR: ABNORMAL
MANUAL DIFF BLD: NORMAL
MCH RBC QN AUTO: 30.8 PG (ref 27–33)
MCHC RBC AUTO-ENTMCNC: 31.2 G/DL (ref 33.7–35.3)
MCV RBC AUTO: 98.9 FL (ref 81.4–97.8)
MONOCYTES # BLD AUTO: 0.3 K/UL (ref 0–0.85)
MONOCYTES NFR BLD AUTO: 2.6 % (ref 0–13.4)
MORPHOLOGY BLD-IMP: NORMAL
MYELOCYTES NFR BLD MANUAL: 0.9 %
NEUTROPHILS # BLD AUTO: 10.01 K/UL (ref 1.82–7.42)
NEUTROPHILS NFR BLD: 87.8 % (ref 44–72)
NRBC # BLD AUTO: 0 K/UL
NRBC BLD-RTO: 0 /100 WBC
PLATELET # BLD AUTO: 248 K/UL (ref 164–446)
PLATELET BLD QL SMEAR: NORMAL
PMV BLD AUTO: 10.2 FL (ref 9–12.9)
POLYCHROMASIA BLD QL SMEAR: NORMAL
POTASSIUM SERPL-SCNC: 4 MMOL/L (ref 3.6–5.5)
RBC # BLD AUTO: 2.66 M/UL (ref 4.7–6.1)
RBC BLD AUTO: PRESENT
SODIUM SERPL-SCNC: 130 MMOL/L (ref 135–145)
WBC # BLD AUTO: 11.4 K/UL (ref 4.8–10.8)

## 2020-11-14 PROCEDURE — A9270 NON-COVERED ITEM OR SERVICE: HCPCS | Performed by: STUDENT IN AN ORGANIZED HEALTH CARE EDUCATION/TRAINING PROGRAM

## 2020-11-14 PROCEDURE — 700102 HCHG RX REV CODE 250 W/ 637 OVERRIDE(OP): Performed by: STUDENT IN AN ORGANIZED HEALTH CARE EDUCATION/TRAINING PROGRAM

## 2020-11-14 PROCEDURE — 700102 HCHG RX REV CODE 250 W/ 637 OVERRIDE(OP): Performed by: HOSPITALIST

## 2020-11-14 PROCEDURE — 94640 AIRWAY INHALATION TREATMENT: CPT

## 2020-11-14 PROCEDURE — 85027 COMPLETE CBC AUTOMATED: CPT

## 2020-11-14 PROCEDURE — 99232 SBSQ HOSP IP/OBS MODERATE 35: CPT | Performed by: HOSPITALIST

## 2020-11-14 PROCEDURE — A9270 NON-COVERED ITEM OR SERVICE: HCPCS | Performed by: INTERNAL MEDICINE

## 2020-11-14 PROCEDURE — 770006 HCHG ROOM/CARE - MED/SURG/GYN SEMI*

## 2020-11-14 PROCEDURE — 700111 HCHG RX REV CODE 636 W/ 250 OVERRIDE (IP): Performed by: STUDENT IN AN ORGANIZED HEALTH CARE EDUCATION/TRAINING PROGRAM

## 2020-11-14 PROCEDURE — 80048 BASIC METABOLIC PNL TOTAL CA: CPT

## 2020-11-14 PROCEDURE — 700111 HCHG RX REV CODE 636 W/ 250 OVERRIDE (IP): Performed by: INTERNAL MEDICINE

## 2020-11-14 PROCEDURE — 85007 BL SMEAR W/DIFF WBC COUNT: CPT

## 2020-11-14 PROCEDURE — 82962 GLUCOSE BLOOD TEST: CPT | Mod: 91

## 2020-11-14 PROCEDURE — 700102 HCHG RX REV CODE 250 W/ 637 OVERRIDE(OP): Performed by: INTERNAL MEDICINE

## 2020-11-14 PROCEDURE — A9270 NON-COVERED ITEM OR SERVICE: HCPCS | Performed by: HOSPITALIST

## 2020-11-14 PROCEDURE — 700101 HCHG RX REV CODE 250: Performed by: STUDENT IN AN ORGANIZED HEALTH CARE EDUCATION/TRAINING PROGRAM

## 2020-11-14 RX ORDER — HYDROCODONE BITARTRATE AND ACETAMINOPHEN 10; 325 MG/1; MG/1
1 TABLET ORAL EVERY 4 HOURS PRN
Status: DISCONTINUED | OUTPATIENT
Start: 2020-11-14 | End: 2020-11-16 | Stop reason: HOSPADM

## 2020-11-14 RX ADMIN — HEPARIN SODIUM 5000 UNITS: 5000 INJECTION, SOLUTION INTRAVENOUS; SUBCUTANEOUS at 05:12

## 2020-11-14 RX ADMIN — HYDROCODONE BITARTRATE AND ACETAMINOPHEN 1 TABLET: 10; 325 TABLET ORAL at 14:06

## 2020-11-14 RX ADMIN — HEPARIN SODIUM 5000 UNITS: 5000 INJECTION, SOLUTION INTRAVENOUS; SUBCUTANEOUS at 22:36

## 2020-11-14 RX ADMIN — HYDROCODONE BITARTRATE AND ACETAMINOPHEN 1 TABLET: 10; 325 TABLET ORAL at 05:12

## 2020-11-14 RX ADMIN — CEFAZOLIN SODIUM 2 G: 2 INJECTION, SOLUTION INTRAVENOUS at 22:36

## 2020-11-14 RX ADMIN — ROSUVASTATIN CALCIUM 40 MG: 20 TABLET, FILM COATED ORAL at 22:49

## 2020-11-14 RX ADMIN — GABAPENTIN 300 MG: 300 CAPSULE ORAL at 16:19

## 2020-11-14 RX ADMIN — AMIODARONE HYDROCHLORIDE 200 MG: 200 TABLET ORAL at 06:00

## 2020-11-14 RX ADMIN — HYDROMORPHONE HYDROCHLORIDE 1 MG: 1 INJECTION, SOLUTION INTRAMUSCULAR; INTRAVENOUS; SUBCUTANEOUS at 15:20

## 2020-11-14 RX ADMIN — CEFAZOLIN SODIUM 2 G: 2 INJECTION, SOLUTION INTRAVENOUS at 14:06

## 2020-11-14 RX ADMIN — GABAPENTIN 300 MG: 300 CAPSULE ORAL at 05:12

## 2020-11-14 RX ADMIN — DOCUSATE SODIUM 50 MG AND SENNOSIDES 8.6 MG 2 TABLET: 8.6; 5 TABLET, FILM COATED ORAL at 16:20

## 2020-11-14 RX ADMIN — HYDROCODONE BITARTRATE AND ACETAMINOPHEN 1 TABLET: 10; 325 TABLET ORAL at 09:34

## 2020-11-14 RX ADMIN — GABAPENTIN 300 MG: 300 CAPSULE ORAL at 12:03

## 2020-11-14 RX ADMIN — HYDROMORPHONE HYDROCHLORIDE 1 MG: 1 INJECTION, SOLUTION INTRAMUSCULAR; INTRAVENOUS; SUBCUTANEOUS at 22:36

## 2020-11-14 RX ADMIN — CEFAZOLIN SODIUM 2 G: 2 INJECTION, SOLUTION INTRAVENOUS at 05:12

## 2020-11-14 RX ADMIN — DOCUSATE SODIUM 50 MG AND SENNOSIDES 8.6 MG 2 TABLET: 8.6; 5 TABLET, FILM COATED ORAL at 05:12

## 2020-11-14 RX ADMIN — HYDROMORPHONE HYDROCHLORIDE 1 MG: 1 INJECTION, SOLUTION INTRAMUSCULAR; INTRAVENOUS; SUBCUTANEOUS at 06:45

## 2020-11-14 RX ADMIN — GLYCOPYRROLATE 1 CAPSULE: 15.6 CAPSULE RESPIRATORY (INHALATION) at 09:34

## 2020-11-14 RX ADMIN — Medication 400 MG: at 05:12

## 2020-11-14 RX ADMIN — HYDROCODONE BITARTRATE AND ACETAMINOPHEN 1 TABLET: 10; 325 TABLET ORAL at 20:01

## 2020-11-14 RX ADMIN — HEPARIN SODIUM 5000 UNITS: 5000 INJECTION, SOLUTION INTRAVENOUS; SUBCUTANEOUS at 14:06

## 2020-11-14 RX ADMIN — IPRATROPIUM BROMIDE AND ALBUTEROL SULFATE 3 ML: .5; 3 SOLUTION RESPIRATORY (INHALATION) at 03:21

## 2020-11-14 ASSESSMENT — ENCOUNTER SYMPTOMS
CHILLS: 0
NEUROLOGICAL NEGATIVE: 1
SORE THROAT: 0
WEIGHT LOSS: 0
FOCAL WEAKNESS: 0
WEAKNESS: 0
MYALGIAS: 0
DIAPHORESIS: 0
CONSTITUTIONAL NEGATIVE: 1
PSYCHIATRIC NEGATIVE: 1
CARDIOVASCULAR NEGATIVE: 1
BRUISES/BLEEDS EASILY: 0
GASTROINTESTINAL NEGATIVE: 1
PALPITATIONS: 0
EYES NEGATIVE: 1
COUGH: 0
SHORTNESS OF BREATH: 0
DIZZINESS: 0
DEPRESSION: 0
HEADACHES: 0
ABDOMINAL PAIN: 0
FEVER: 0
BLURRED VISION: 0
NAUSEA: 0
RESPIRATORY NEGATIVE: 1
VOMITING: 0

## 2020-11-14 ASSESSMENT — FIBROSIS 4 INDEX: FIB4 SCORE: 1.96

## 2020-11-14 ASSESSMENT — LIFESTYLE VARIABLES: SUBSTANCE_ABUSE: 0

## 2020-11-14 ASSESSMENT — PAIN DESCRIPTION - PAIN TYPE
TYPE: SURGICAL PAIN
TYPE: SURGICAL PAIN;ACUTE PAIN

## 2020-11-14 NOTE — PROGRESS NOTES
"S:  Seen and examined.  POD #1 s/p L AKA.  Doing well this afternoon.  No new complaints, pain controlled.  Having trouble keeping dressing on.    O: /62   Pulse 80   Temp 36.6 °C (97.8 °F) (Temporal)   Resp 16   Ht 1.956 m (6' 5\")   Wt (!) 154.7 kg (341 lb)   SpO2 98% .      Intake/Output Summary (Last 24 hours) at 11/13/2020 1624  Last data filed at 11/13/2020 1230  Gross per 24 hour   Intake 4600 ml   Output 730 ml   Net 3870 ml   .    Operative/injured extremity examined.   Toes warm, well-perfused.  Wound c/d/i, supple amputation flaps    Recent Labs     11/11/20  0554 11/12/20  0247 11/12/20  1753   WBC 10.3 9.9 15.3*   RBC 2.93* 2.98* 3.09*   HEMOGLOBIN 8.7* 8.8* 9.4*   HEMATOCRIT 27.9* 28.7* 30.2*   MCV 95.2 96.3 97.7   MCH 29.7 29.5 30.4   MCHC 31.2* 30.7* 31.1*   RDW 62.3* 62.5* 61.5*   PLATELETCT 226 232 169   MPV 9.8 9.7 10.2       A/P:    POD #1 s/p L AKA    Antibiotics: Per ID  Activity: NWB operative extremity.  PT today.  Diet: General  Dressing:  Continue wound care to RLE.  [facility]  Drain:  Remove when output <30 mL/24 hrs  DVT: Mechanical (SCDs) + Pharmacologic (Per medicine)  Dispo: Would check with Dr. Johnson with Vascular surgery to see if any intervention for RLE is warranted now that source control has been obtained.  Otherwise stable for d/c when drain out from ortho perspective    "

## 2020-11-14 NOTE — PROGRESS NOTES
"   Orthopaedic Progress Note    Interval changes:  Patient doing well POD#2 S/P L AKA    ROS - Patient denies any new issues, except per above.       /77   Pulse 82   Temp 36.1 °C (96.9 °F) (Temporal)   Resp 18   Ht 1.956 m (6' 5\")   Wt (!) 154.7 kg (341 lb)   SpO2 97%     Patient seen and examined  No acute distress  Breathing non labored  RRR  Proximal compartments soft drain patent  Dressing CDI    Recent Labs     11/12/20  0247 11/12/20  1753 11/14/20  0524   WBC 9.9 15.3* 11.4*   RBC 2.98* 3.09* 2.66*   HEMOGLOBIN 8.8* 9.4* 8.2*   HEMATOCRIT 28.7* 30.2* 26.3*   MCV 96.3 97.7 98.9*   MCH 29.5 30.4 30.8   MCHC 30.7* 31.1* 31.2*   RDW 62.5* 61.5* 64.2*   PLATELETCT 232 169 248   MPV 9.7 10.2 10.2     Recent Labs     11/12/20 0247 11/14/20  0524   SODIUM 131* 130*   POTASSIUM 4.4 4.0   CHLORIDE 94* 95*   CO2 31 29   GLUCOSE 149* 204*   BUN 17 21     Recent Labs     11/12/20 0247 11/14/20  0524   ALBUMIN 2.2*  --    TBILIRUBIN 0.3  --    ALKPHOSPHAT 68  --    TOTPROTEIN 7.0  --    ALTSGPT 7  --    ASTSGOT 23  --    CREATININE 0.70 0.73       Active Hospital Problems    Diagnosis   • Right-sided heart failure (HCC) [I50.810]     Priority: High   • Sepsis ([facility]) [A41.9]     Priority: High   • Cellulitis [L03.90]     Priority: High   • Paroxysmal atrial fibrillation ([facility]) [I48.0]     Priority: Medium   • LINSEY (acute kidney injury) ([facility]) [N17.9]     Priority: Medium   • Elevated troponin [R77.8]     Priority: Low   • Cigarette nicotine dependence with nicotine-induced disorder [F17.219]     Priority: Low   • Heart failure with preserved ejection fraction, borderline, class IV ([facility]) [I50.30]     Priority: Low   • Type 2 diabetes mellitus with complication, without long-term current use of insulin ([facility]) [E11.8]     Priority: Low   • COPD (chronic obstructive pulmonary disease) (HCC) [J44.9]     Priority: Low   • Hyponatremia [E87.1]     Priority: Low       Assessment/Plan:  Patient doing well  POD#2 S/P L " AKA  POD#10 S/P Left guillotine through-knee amputation  Wt bearing status - NWB LLE  Wound care/Drains - dressing changed bty RN/LPS team. HV 80 mL last 24h, left in  Future Procedures - none anticipated  Sutures/Staples out- 14-21 day post definitive amputation  PT/OT-initiated  Antibiotics: ancef 2g IV q8  DVT Prophylaxis- TEDS/SCDs/Foot pumps, UFH 5Ku q8  Ceballos-none  Case Coordination for Discharge Planning - Disposition rehab likely, pending Tx reccs.  Recommend LMWH rather than UFH, ASA on DC/Xfr  Labs in AM  Sutures out 2-3 weeks postop with Dr. Durbin in Clinic  LPS to return Monday to see pt.

## 2020-11-14 NOTE — PROGRESS NOTES
[facility] Medicine Daily Progress Note    Date of Service  11/14/2020    Chief Complaint  54 y.o. male admitted 10/29/2020 with worsening bilateral leg wounds    Hospital Course  Patient is a 54 year old male with a history of  DM, a. fib (on  Chronic anticoagulation), CAD (s/p CABG), SHASHANK, previous nicotine use and COPD who presented with a chief complaint of worsening bilateral lower extremity swelling,and pain. His symptoms had been worseing for several weeks prior to admission.  He was admitted for treatment of suspected necrotizing cellulitis and on 11/1/20 he was transferred to the ICU for sepsis with hypotension, hyponatremia, acute renal failure, acute systolic heart failure and hypoxia.  He was found to have group A strep bacteremia with severe PAD.    On 11/4/20 he underwent a left sided guillotine through the knee amputation with Dr. Durbin.    Interval Problem Update  Surgical site pain improving, discussed starting titration off of iv meds  Discussed with Alex and dino further plans for surgery during this hospital stay  Axox3, no sob, no cp  Ros otherwise negative  Discussed with nursing    Consultants/Specialty  Vascular surgery Alex  Critical care  Orthopedic surgery Ramakrishna  ID  Cardiology    Code Status  Full Code    Disposition  Rehab when medically stable    Review of Systems  Review of Systems   Constitutional: Negative.  Negative for chills, diaphoresis, fever, malaise/fatigue and weight loss.   HENT: Negative.  Negative for sore throat.    Eyes: Negative.  Negative for blurred vision.   Respiratory: Negative.  Negative for cough and shortness of breath.    Cardiovascular: Negative.  Negative for chest pain, palpitations and leg swelling.   Gastrointestinal: Negative.  Negative for abdominal pain, nausea and vomiting.   Genitourinary: Negative.  Negative for dysuria.   Musculoskeletal: Positive for joint pain. Negative for myalgias.   Skin: Negative.  Negative for itching and rash.   Neurological:  Negative.  Negative for dizziness, focal weakness, weakness and headaches.   Endo/Heme/Allergies: Negative.  Does not bruise/bleed easily.   Psychiatric/Behavioral: Negative.  Negative for depression, substance abuse and suicidal ideas.   All other systems reviewed and are negative.       Physical Exam  Temp:  [35.9 °C (96.7 °F)-36.6 °C (97.8 °F)] 36.1 °C (96.9 °F)  Pulse:  [80-91] 82  Resp:  [16-18] 18  BP: (100-111)/(62-77) 111/77  SpO2:  [93 %-98 %] 97 %    Physical Exam  Vitals signs and nursing note reviewed. Exam conducted with a chaperone present.   Constitutional:       General: He is not in acute distress.     Appearance: Normal appearance. He is not diaphoretic.   HENT:      Head: Normocephalic.      Nose: Nose normal.      Mouth/Throat:      Mouth: Mucous membranes are moist.   Eyes:      Pupils: Pupils are equal, round, and reactive to light.   Cardiovascular:      Rate and Rhythm: Normal rate and regular rhythm.      Pulses: Normal pulses.      Heart sounds: Normal heart sounds.   Pulmonary:      Effort: Pulmonary effort is normal.      Breath sounds: Normal breath sounds.   Abdominal:      General: Abdomen is flat. Bowel sounds are normal.      Palpations: Abdomen is soft.      Comments: Umbilical hernia reducible   Musculoskeletal: Normal range of motion.         General: No swelling or deformity.      Comments: R leg cellulltis stabe  Left aka site cdi with hemovac in place - minimal output   Skin:     General: Skin is warm and dry.      Capillary Refill: Capillary refill takes less than 2 seconds.   Neurological:      General: No focal deficit present.      Mental Status: He is alert and oriented to person, place, and time.      Cranial Nerves: No cranial nerve deficit.   Psychiatric:         Mood and Affect: Mood normal.         Behavior: Behavior normal.         Fluids    Intake/Output Summary (Last 24 hours) at 11/14/2020 1504  Last data filed at 11/14/2020 1200  Gross per 24 hour   Intake --    Output 360 ml   Net -360 ml       Laboratory  Recent Labs     11/12/20  0247 11/12/20  1753 11/14/20  0524   WBC 9.9 15.3* 11.4*   RBC 2.98* 3.09* 2.66*   HEMOGLOBIN 8.8* 9.4* 8.2*   HEMATOCRIT 28.7* 30.2* 26.3*   MCV 96.3 97.7 98.9*   MCH 29.5 30.4 30.8   MCHC 30.7* 31.1* 31.2*   RDW 62.5* 61.5* 64.2*   PLATELETCT 232 169 248   MPV 9.7 10.2 10.2     Recent Labs     11/12/20  0247 11/14/20  0524   SODIUM 131* 130*   POTASSIUM 4.4 4.0   CHLORIDE 94* 95*   CO2 31 29   GLUCOSE 149* 204*   BUN 17 21   CREATININE 0.70 0.73   CALCIUM 8.5 8.0*     Recent Labs     11/11/20  1516   INR 1.17*               Imaging  EC-ECHOCARDIOGRAM LTD W/ CONT   Final Result      DX-CHEST-LIMITED (1 VIEW)   Final Result      1.  Right internal jugular catheter is been placed and appears appropriately located      2.  Moderate pulmonary edema      3.  Enlarged cardiac silhouette      CT-EXTREMITY, LOWER W/O RIGHT   Final Result      1.  No discrete fluid collection is identified to suggest abscess.      2.  Diffuse subcutaneous edema and overlying skin thickening with venous varicosities.      3.  Atherosclerosis.      4.  Diffuse muscle atrophy.      CT-EXTREMITY, LOWER W/O LEFT   Final Result      1.  Left leg cellulitis without abscess or soft tissue gas      2.  Small vessel atherosclerotic plaque      3.  osteoarthritis of the left knee and left midfoot      4.  Muscular atrophy      EC-ECHOCARDIOGRAM COMPLETE W/O CONT   Final Result      DX-CHEST-PORTABLE (1 VIEW)   Final Result         1.  Pulmonary edema and/or infiltrates are identified, which are stable since the prior exam.   2.  Cardiomegaly      US-EXTREMITY ARTERY LOWER BILAT   Final Result      US-EXTREMITY VENOUS LOWER BILAT   Final Result      DX-TIBIA AND FIBULA RIGHT   Final Result         1.  No acute traumatic bony injury.   2.  Tricompartmental degenerative changes of the knee   3.  Atherosclerosis      DX-TIBIA AND FIBULA LEFT   Final Result   Addendum 1 of 1    Addendum: Subtle clustered punctate lucencies lateral to the ankle are    seen which could represent tiny foci of soft tissue gas.      These findings were discussed with the patient's clinician, ELIZABETH HILARIO,    on 10/30/2020 12:34 AM.      Final      DX-CHEST-PORTABLE (1 VIEW)   Final Result         1.  Interstitial pulmonary parenchymal prominence, compatible with interstitial edema and/or infiltrates.   2.  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  Continue amiodarone  following    LINSEY (acute kidney injury) ([facility])  Assessment & Plan  Resolved  Etiology likely ATN from sepsis   Continue to follow    Elevated troponin- (present on admission)  Assessment & Plan  no acute ischemic changes on EKG  Seen by cardiology  No chest pain  Likely related to demand ischemia, Type 2 MI, in the setting of heart failure and sepsis  11/2/20: Repeat ECHO - EF 50%, RVSP 40, mild AS    Cigarette nicotine dependence with nicotine-induced disorder- (present on admission)  Assessment & Plan  Cessation discussed and recommended    Heart failure with preserved ejection fraction, borderline, class IV (HCC)- (present on admission)  Assessment & Plan  Acute due to sepsis  Continue supportive care      Type 2 diabetes mellitus with  complication, without long-term current use of insulin (HCC)- (present on admission)  Assessment & Plan  HbA1C 7.8  Continue ISS  Continue hypoglycemia protocol    COPD (chronic obstructive pulmonary disease) ([facility])  Assessment & Plan  No sign of acute exacerbation  Currently stable on RA  Continue to follow    Hyponatremia  Assessment & Plan  Mild  In stable range  following         VTE prophylaxis: heparin

## 2020-11-14 NOTE — PROGRESS NOTES
"This RN was notified of [employer] delivery. Patient initially stated he ordered a taco salad because it's \"healthy and does not eat the shell part\". Upon entering the room, a soda cup and large milkshake are noted at bedside. This RN re-educated patient that he is on a diabetic diet and is requiring insulin and this is not within his orders for his stay. This RN further educated that behavior and this lifestyle did not help with his left AKA and is putting his right leg at risk for future amputation along with the current infection noted. Patient states \"don't be mad. I'm just hungry\". Patient has little evidence of understanding regarding diet, diabetes and current predicament.   "

## 2020-11-15 LAB
ANION GAP SERPL CALC-SCNC: 3 MMOL/L (ref 7–16)
ANISOCYTOSIS BLD QL SMEAR: ABNORMAL
BASOPHILS # BLD AUTO: 0.9 % (ref 0–1.8)
BASOPHILS # BLD: 0.1 K/UL (ref 0–0.12)
BUN SERPL-MCNC: 20 MG/DL (ref 8–22)
CALCIUM SERPL-MCNC: 8.1 MG/DL (ref 8.5–10.5)
CHLORIDE SERPL-SCNC: 95 MMOL/L (ref 96–112)
CO2 SERPL-SCNC: 30 MMOL/L (ref 20–33)
CREAT SERPL-MCNC: 0.76 MG/DL (ref 0.5–1.4)
EOSINOPHIL # BLD AUTO: 0.29 K/UL (ref 0–0.51)
EOSINOPHIL NFR BLD: 2.7 % (ref 0–6.9)
ERYTHROCYTE [DISTWIDTH] IN BLOOD BY AUTOMATED COUNT: 65.6 FL (ref 35.9–50)
GLUCOSE BLD-MCNC: 143 MG/DL (ref 65–99)
GLUCOSE BLD-MCNC: 143 MG/DL (ref 65–99)
GLUCOSE BLD-MCNC: 167 MG/DL (ref 65–99)
GLUCOSE BLD-MCNC: 187 MG/DL (ref 65–99)
GLUCOSE SERPL-MCNC: 166 MG/DL (ref 65–99)
HCT VFR BLD AUTO: 26.3 % (ref 42–52)
HGB BLD-MCNC: 8 G/DL (ref 14–18)
HYPOCHROMIA BLD QL SMEAR: ABNORMAL
LYMPHOCYTES # BLD AUTO: 0.48 K/UL (ref 1–4.8)
LYMPHOCYTES NFR BLD: 4.5 % (ref 22–41)
MACROCYTES BLD QL SMEAR: ABNORMAL
MANUAL DIFF BLD: NORMAL
MCH RBC QN AUTO: 30.1 PG (ref 27–33)
MCHC RBC AUTO-ENTMCNC: 30.4 G/DL (ref 33.7–35.3)
MCV RBC AUTO: 98.9 FL (ref 81.4–97.8)
MICROCYTES BLD QL SMEAR: ABNORMAL
MONOCYTES # BLD AUTO: 0.19 K/UL (ref 0–0.85)
MONOCYTES NFR BLD AUTO: 1.8 % (ref 0–13.4)
MORPHOLOGY BLD-IMP: NORMAL
NEUTROPHILS # BLD AUTO: 9.65 K/UL (ref 1.82–7.42)
NEUTROPHILS NFR BLD: 90.2 % (ref 44–72)
NRBC # BLD AUTO: 0 K/UL
NRBC BLD-RTO: 0 /100 WBC
PLATELET # BLD AUTO: 248 K/UL (ref 164–446)
PLATELET BLD QL SMEAR: NORMAL
PMV BLD AUTO: 10.1 FL (ref 9–12.9)
POLYCHROMASIA BLD QL SMEAR: NORMAL
POTASSIUM SERPL-SCNC: 4.5 MMOL/L (ref 3.6–5.5)
RBC # BLD AUTO: 2.66 M/UL (ref 4.7–6.1)
RBC BLD AUTO: PRESENT
SODIUM SERPL-SCNC: 128 MMOL/L (ref 135–145)
WBC # BLD AUTO: 10.7 K/UL (ref 4.8–10.8)

## 2020-11-15 PROCEDURE — 82962 GLUCOSE BLOOD TEST: CPT

## 2020-11-15 PROCEDURE — 85007 BL SMEAR W/DIFF WBC COUNT: CPT

## 2020-11-15 PROCEDURE — 99232 SBSQ HOSP IP/OBS MODERATE 35: CPT | Performed by: HOSPITALIST

## 2020-11-15 PROCEDURE — 80048 BASIC METABOLIC PNL TOTAL CA: CPT

## 2020-11-15 PROCEDURE — 700102 HCHG RX REV CODE 250 W/ 637 OVERRIDE(OP): Performed by: INTERNAL MEDICINE

## 2020-11-15 PROCEDURE — 85027 COMPLETE CBC AUTOMATED: CPT

## 2020-11-15 PROCEDURE — 94640 AIRWAY INHALATION TREATMENT: CPT

## 2020-11-15 PROCEDURE — 700102 HCHG RX REV CODE 250 W/ 637 OVERRIDE(OP): Performed by: STUDENT IN AN ORGANIZED HEALTH CARE EDUCATION/TRAINING PROGRAM

## 2020-11-15 PROCEDURE — A9270 NON-COVERED ITEM OR SERVICE: HCPCS | Performed by: STUDENT IN AN ORGANIZED HEALTH CARE EDUCATION/TRAINING PROGRAM

## 2020-11-15 PROCEDURE — 94760 N-INVAS EAR/PLS OXIMETRY 1: CPT

## 2020-11-15 PROCEDURE — 770006 HCHG ROOM/CARE - MED/SURG/GYN SEMI*

## 2020-11-15 PROCEDURE — 700111 HCHG RX REV CODE 636 W/ 250 OVERRIDE (IP): Performed by: INTERNAL MEDICINE

## 2020-11-15 PROCEDURE — 700111 HCHG RX REV CODE 636 W/ 250 OVERRIDE (IP): Performed by: HOSPITALIST

## 2020-11-15 PROCEDURE — 700111 HCHG RX REV CODE 636 W/ 250 OVERRIDE (IP): Performed by: STUDENT IN AN ORGANIZED HEALTH CARE EDUCATION/TRAINING PROGRAM

## 2020-11-15 PROCEDURE — 700102 HCHG RX REV CODE 250 W/ 637 OVERRIDE(OP): Performed by: HOSPITALIST

## 2020-11-15 PROCEDURE — A9270 NON-COVERED ITEM OR SERVICE: HCPCS | Performed by: HOSPITALIST

## 2020-11-15 PROCEDURE — 36415 COLL VENOUS BLD VENIPUNCTURE: CPT

## 2020-11-15 PROCEDURE — A9270 NON-COVERED ITEM OR SERVICE: HCPCS | Performed by: INTERNAL MEDICINE

## 2020-11-15 RX ORDER — HYDROMORPHONE HYDROCHLORIDE 1 MG/ML
0.5 INJECTION, SOLUTION INTRAMUSCULAR; INTRAVENOUS; SUBCUTANEOUS 2 TIMES DAILY PRN
Status: DISCONTINUED | OUTPATIENT
Start: 2020-11-15 | End: 2020-11-16 | Stop reason: HOSPADM

## 2020-11-15 RX ORDER — ALBUTEROL SULFATE 90 UG/1
2 AEROSOL, METERED RESPIRATORY (INHALATION) EVERY 4 HOURS PRN
Status: DISCONTINUED | OUTPATIENT
Start: 2020-11-15 | End: 2020-11-16 | Stop reason: HOSPADM

## 2020-11-15 RX ADMIN — DOCUSATE SODIUM 50 MG AND SENNOSIDES 8.6 MG 2 TABLET: 8.6; 5 TABLET, FILM COATED ORAL at 17:33

## 2020-11-15 RX ADMIN — GABAPENTIN 300 MG: 300 CAPSULE ORAL at 17:33

## 2020-11-15 RX ADMIN — HEPARIN SODIUM 5000 UNITS: 5000 INJECTION, SOLUTION INTRAVENOUS; SUBCUTANEOUS at 21:46

## 2020-11-15 RX ADMIN — HEPARIN SODIUM 5000 UNITS: 5000 INJECTION, SOLUTION INTRAVENOUS; SUBCUTANEOUS at 13:12

## 2020-11-15 RX ADMIN — AMIODARONE HYDROCHLORIDE 200 MG: 200 TABLET ORAL at 04:32

## 2020-11-15 RX ADMIN — HYDROCODONE BITARTRATE AND ACETAMINOPHEN 1 TABLET: 10; 325 TABLET ORAL at 17:33

## 2020-11-15 RX ADMIN — DOCUSATE SODIUM 50 MG AND SENNOSIDES 8.6 MG 2 TABLET: 8.6; 5 TABLET, FILM COATED ORAL at 04:32

## 2020-11-15 RX ADMIN — GABAPENTIN 300 MG: 300 CAPSULE ORAL at 04:31

## 2020-11-15 RX ADMIN — HEPARIN SODIUM 5000 UNITS: 5000 INJECTION, SOLUTION INTRAVENOUS; SUBCUTANEOUS at 04:31

## 2020-11-15 RX ADMIN — CEFAZOLIN SODIUM 2 G: 2 INJECTION, SOLUTION INTRAVENOUS at 04:32

## 2020-11-15 RX ADMIN — GLYCOPYRROLATE 1 CAPSULE: 15.6 CAPSULE RESPIRATORY (INHALATION) at 20:46

## 2020-11-15 RX ADMIN — ALBUTEROL SULFATE 2 PUFF: 90 AEROSOL, METERED RESPIRATORY (INHALATION) at 05:12

## 2020-11-15 RX ADMIN — HYDROMORPHONE HYDROCHLORIDE 0.5 MG: 1 INJECTION, SOLUTION INTRAMUSCULAR; INTRAVENOUS; SUBCUTANEOUS at 23:00

## 2020-11-15 RX ADMIN — CEFAZOLIN SODIUM 2 G: 2 INJECTION, SOLUTION INTRAVENOUS at 13:12

## 2020-11-15 RX ADMIN — HYDROMORPHONE HYDROCHLORIDE 1 MG: 1 INJECTION, SOLUTION INTRAMUSCULAR; INTRAVENOUS; SUBCUTANEOUS at 04:31

## 2020-11-15 RX ADMIN — HYDROCODONE BITARTRATE AND ACETAMINOPHEN 1 TABLET: 10; 325 TABLET ORAL at 02:37

## 2020-11-15 RX ADMIN — HYDROCODONE BITARTRATE AND ACETAMINOPHEN 1 TABLET: 10; 325 TABLET ORAL at 10:01

## 2020-11-15 RX ADMIN — Medication 400 MG: at 04:32

## 2020-11-15 RX ADMIN — HYDROMORPHONE HYDROCHLORIDE 0.5 MG: 1 INJECTION, SOLUTION INTRAMUSCULAR; INTRAVENOUS; SUBCUTANEOUS at 13:44

## 2020-11-15 RX ADMIN — CEFAZOLIN SODIUM 2 G: 2 INJECTION, SOLUTION INTRAVENOUS at 21:47

## 2020-11-15 RX ADMIN — GLYCOPYRROLATE 1 CAPSULE: 15.6 CAPSULE RESPIRATORY (INHALATION) at 07:48

## 2020-11-15 RX ADMIN — ALBUTEROL SULFATE 2 PUFF: 90 AEROSOL, METERED RESPIRATORY (INHALATION) at 10:08

## 2020-11-15 RX ADMIN — HYDROCODONE BITARTRATE AND ACETAMINOPHEN 1 TABLET: 10; 325 TABLET ORAL at 21:46

## 2020-11-15 RX ADMIN — ROSUVASTATIN CALCIUM 40 MG: 20 TABLET, FILM COATED ORAL at 21:46

## 2020-11-15 RX ADMIN — GABAPENTIN 300 MG: 300 CAPSULE ORAL at 13:12

## 2020-11-15 ASSESSMENT — ENCOUNTER SYMPTOMS
BLURRED VISION: 0
DIZZINESS: 0
VOMITING: 0
CONSTITUTIONAL NEGATIVE: 1
ABDOMINAL PAIN: 0
PALPITATIONS: 0
DEPRESSION: 0
WEAKNESS: 0
WEIGHT LOSS: 0
FEVER: 0
FOCAL WEAKNESS: 0
COUGH: 0
CARDIOVASCULAR NEGATIVE: 1
CHILLS: 0
NEUROLOGICAL NEGATIVE: 1
BRUISES/BLEEDS EASILY: 0
RESPIRATORY NEGATIVE: 1
PSYCHIATRIC NEGATIVE: 1
SORE THROAT: 0
NAUSEA: 0
SHORTNESS OF BREATH: 0
DIAPHORESIS: 0
HEADACHES: 0
EYES NEGATIVE: 1
GASTROINTESTINAL NEGATIVE: 1
MYALGIAS: 0

## 2020-11-15 ASSESSMENT — LIFESTYLE VARIABLES: SUBSTANCE_ABUSE: 0

## 2020-11-15 ASSESSMENT — PAIN DESCRIPTION - PAIN TYPE
TYPE: ACUTE PAIN;SURGICAL PAIN
TYPE: ACUTE PAIN
TYPE: ACUTE PAIN
TYPE: ACUTE PAIN;SURGICAL PAIN
TYPE: SURGICAL PAIN
TYPE: ACUTE PAIN;SURGICAL PAIN

## 2020-11-15 NOTE — PROGRESS NOTES
"   Orthopaedic Progress Note    Interval changes:  Patient doing well POD#3 S/P L AKA, HV still with generous output - left in  Care transition to LPS tomorrow  Pt and wif concerned that he is not turned appropriately, skin on backside deteriorating, \"they leave me alone for hours even when I call\"    ROS - Patient denies any new issues, except per above.       /60   Pulse 72   Temp 36.2 °C (97.2 °F) (Temporal)   Resp 16   Ht 1.956 m (6' 5\")   Wt (!) 156.4 kg (344 lb 12.8 oz)   SpO2 98%     Patient seen and examined  No acute distress  Breathing non labored  RRR  Proximal compartments soft drain patent  Dressing CDI    Recent Labs     11/12/20  1753 11/14/20  0524 11/15/20  0327   WBC 15.3* 11.4* 10.7   RBC 3.09* 2.66* 2.66*   HEMOGLOBIN 9.4* 8.2* 8.0*   HEMATOCRIT 30.2* 26.3* 26.3*   MCV 97.7 98.9* 98.9*   MCH 30.4 30.8 30.1   MCHC 31.1* 31.2* 30.4*   RDW 61.5* 64.2* 65.6*   PLATELETCT 169 248 248   MPV 10.2 10.2 10.1     Recent Labs     11/14/20  0524 11/15/20  0327   SODIUM 130* 128*   POTASSIUM 4.0 4.5   CHLORIDE 95* 95*   CO2 29 30   GLUCOSE 204* 166*   BUN 21 20     Recent Labs     11/14/20  0524 11/15/20  0327   CREATININE 0.73 0.76       Active Hospital Problems    Diagnosis   • Right-sided heart failure (HCC) [I50.810]     Priority: High   • Sepsis (HCC) [A41.9]     Priority: High   • Cellulitis [L03.90]     Priority: High   • Paroxysmal atrial fibrillation (HCC) [I48.0]     Priority: Medium   • LINSEY (acute kidney injury) (HCC) [N17.9]     Priority: Medium   • Elevated troponin [R77.8]     Priority: Low   • Cigarette nicotine dependence with nicotine-induced disorder [F17.219]     Priority: Low   • Heart failure with preserved ejection fraction, borderline, class IV (HCC) [I50.30]     Priority: Low   • Type 2 diabetes mellitus with complication, without long-term current use of insulin (HCC) [E11.8]     Priority: Low   • COPD (chronic obstructive pulmonary disease) ([facility]) [J44.9]     Priority: Low "   • Hyponatremia [E87.1]     Priority: Low       Assessment/Plan:  Patient doing well  POD#3 S/P L AKA  POD#11 S/P Left guillotine through-knee amputation  Wt bearing status - NWB LLE  Wound care/Drains - dressing changed bty RN/LPS team. HV 80 mL last 24h, left in  Future Procedures - none anticipated  Sutures/Staples out- 14-21 day post definitive amputation  PT/OT-initiated  Antibiotics: ancef 2g IV q8  DVT Prophylaxis- TEDS/SCDs/Foot pumps, UFH 5Ku q8  Ceballos-none  Case Coordination for Discharge Planning - Disposition rehab likely, pending Tx reccs.  Recommend LMWH rather than UFH, ASA on DC/Xfr  Labs in AM  Sutures out 2-3 weeks postop with Dr. Durbin in Clinic  LPS to return Monday to see pt.  Nursing issues d/w RN at bedside

## 2020-11-15 NOTE — PROGRESS NOTES
"[facility] Medicine Daily Progress Note    Date of Service  11/15/2020    Chief Complaint  54 y.o. male admitted 10/29/2020 with worsening bilateral leg wounds    Hospital Course  Patient is a 54 year old male with a history of  DM, a. fib (on  Chronic anticoagulation), CAD (s/p CABG), SHASHANK, previous nicotine use and COPD who presented with a chief complaint of worsening bilateral lower extremity swelling,and pain. His symptoms had been worseing for several weeks prior to admission.  He was admitted for treatment of suspected necrotizing cellulitis and on 11/1/20 he was transferred to the ICU for sepsis with hypotension, hyponatremia, acute renal failure, acute systolic heart failure and hypoxia.  He was found to have group A strep bacteremia with severe PAD.    On 11/4/20 he underwent a left sided guillotine through the knee amputation with Dr. Durbin.    Interval Problem Update  Irritable this am, multiple complaints mainly about the fact that he is not being discharged today, I explained that he has not yet been accepted at a snf  (additionally he will only agree to go to one out of state in [place] \"I don't want to hear it, that's no excuse\".)  He continues to require significant care, his aka site still has a drain.  His wife was on speaker phone and she is also very upset that he is not being discharged today or that we are \"at least not taking him outside several times a day.\" He also reported he is tired of this \"bullshit and my lies\".  I asked him what he felt I had lied about and he said \"well maybe you didn't lie but other people did, I guess you just don't respect me because if you did you would have had me discharged by now.\"  I tried to reassure them that we are doing the best we can to arrange a snf in addition to work with low staff during a pandemic, that we unfortunately do not have the resources currently to take patients down to the garden at this time.  He reports his \"whole body is in pain 8/10\", most " intense at surgical site.  He is swearing and verbally aggressive.    ROS otherwise negative  Discussed with nursing who was at bedside during my exam  Discussed with case mgt  Consultants/Specialty  Vascular surgery Alex  Critical care  Orthopedic surgery Walker  ID  Cardiology    Code Status  Full Code    Disposition  Rehab/ snf when accepted    Review of Systems  Review of Systems   Constitutional: Negative.  Negative for chills, diaphoresis, fever, malaise/fatigue and weight loss.   HENT: Negative.  Negative for sore throat.    Eyes: Negative.  Negative for blurred vision.   Respiratory: Negative.  Negative for cough and shortness of breath.    Cardiovascular: Negative.  Negative for chest pain, palpitations and leg swelling.   Gastrointestinal: Negative.  Negative for abdominal pain, nausea and vomiting.   Genitourinary: Negative.  Negative for dysuria.   Musculoskeletal: Positive for joint pain. Negative for myalgias.   Skin: Negative.  Negative for itching and rash.   Neurological: Negative.  Negative for dizziness, focal weakness, weakness and headaches.   Endo/Heme/Allergies: Negative.  Does not bruise/bleed easily.   Psychiatric/Behavioral: Negative.  Negative for depression, substance abuse and suicidal ideas.   All other systems reviewed and are negative.       Physical Exam  Temp:  [36.1 °C (97 °F)-37.1 °C (98.7 °F)] 36.2 °C (97.2 °F)  Pulse:  [72-78] 72  Resp:  [16-19] 16  BP: (102-115)/(60-71) 102/60  SpO2:  [92 %-98 %] 98 %    Physical Exam  Vitals signs and nursing note reviewed. Exam conducted with a chaperone present.   Constitutional:       General: He is not in acute distress.     Appearance: Normal appearance. He is not diaphoretic.   HENT:      Head: Normocephalic.      Nose: Nose normal.      Mouth/Throat:      Mouth: Mucous membranes are moist.   Eyes:      Pupils: Pupils are equal, round, and reactive to light.   Cardiovascular:      Rate and Rhythm: Normal rate and regular rhythm.       Pulses: Normal pulses.      Heart sounds: Normal heart sounds.   Pulmonary:      Effort: Pulmonary effort is normal.      Breath sounds: Normal breath sounds.   Abdominal:      General: Abdomen is flat. Bowel sounds are normal.      Palpations: Abdomen is soft.      Comments: Umbilical hernia reducible   Musculoskeletal: Normal range of motion.         General: No swelling or deformity.      Comments: R leg cellulltis stabe  Left aka site cdi with hemovac in place - minimal output   Skin:     General: Skin is warm and dry.      Capillary Refill: Capillary refill takes less than 2 seconds.      Comments: Erythema and stage 1 wounds back and buttocks   Neurological:      General: No focal deficit present.      Mental Status: He is alert and oriented to person, place, and time.      Cranial Nerves: No cranial nerve deficit.   Psychiatric:         Mood and Affect: Affect is blunt, angry and inappropriate.         Behavior: Behavior is aggressive.         Thought Content: Thought content does not include homicidal ideation.         Judgment: Judgment is inappropriate.         Fluids    Intake/Output Summary (Last 24 hours) at 11/15/2020 1257  Last data filed at 11/15/2020 1000  Gross per 24 hour   Intake 240 ml   Output 70 ml   Net 170 ml       Laboratory  Recent Labs     11/12/20  1753 11/14/20  0524 11/15/20  0327   WBC 15.3* 11.4* 10.7   RBC 3.09* 2.66* 2.66*   HEMOGLOBIN 9.4* 8.2* 8.0*   HEMATOCRIT 30.2* 26.3* 26.3*   MCV 97.7 98.9* 98.9*   MCH 30.4 30.8 30.1   MCHC 31.1* 31.2* 30.4*   RDW 61.5* 64.2* 65.6*   PLATELETCT 169 248 248   MPV 10.2 10.2 10.1     Recent Labs     11/14/20  0524 11/15/20  0327   SODIUM 130* 128*   POTASSIUM 4.0 4.5   CHLORIDE 95* 95*   CO2 29 30   GLUCOSE 204* 166*   BUN 21 20   CREATININE 0.73 0.76   CALCIUM 8.0* 8.1*                   Imaging  EC-ECHOCARDIOGRAM LTD W/ CONT   Final Result      DX-CHEST-LIMITED (1 VIEW)   Final Result      1.  Right internal jugular catheter is been placed  and appears appropriately located      2.  Moderate pulmonary edema      3.  Enlarged cardiac silhouette      CT-EXTREMITY, LOWER W/O RIGHT   Final Result      1.  No discrete fluid collection is identified to suggest abscess.      2.  Diffuse subcutaneous edema and overlying skin thickening with venous varicosities.      3.  Atherosclerosis.      4.  Diffuse muscle atrophy.      CT-EXTREMITY, LOWER W/O LEFT   Final Result      1.  Left leg cellulitis without abscess or soft tissue gas      2.  Small vessel atherosclerotic plaque      3.  osteoarthritis of the left knee and left midfoot      4.  Muscular atrophy      EC-ECHOCARDIOGRAM COMPLETE W/O CONT   Final Result      DX-CHEST-PORTABLE (1 VIEW)   Final Result         1.  Pulmonary edema and/or infiltrates are identified, which are stable since the prior exam.   2.  Cardiomegaly      US-EXTREMITY ARTERY LOWER BILAT   Final Result      US-EXTREMITY VENOUS LOWER BILAT   Final Result      DX-TIBIA AND FIBULA RIGHT   Final Result         1.  No acute traumatic bony injury.   2.  Tricompartmental degenerative changes of the knee   3.  Atherosclerosis      DX-TIBIA AND FIBULA LEFT   Final Result   Addendum 1 of 1   Addendum: Subtle clustered punctate lucencies lateral to the ankle are    seen which could represent tiny foci of soft tissue gas.      These findings were discussed with the patient's clinician, ELIZABETH HILARIO,    on 10/30/2020 12:34 AM.      Final      DX-CHEST-PORTABLE (1 VIEW)   Final Result         1.  Interstitial pulmonary parenchymal prominence, compatible with interstitial edema and/or infiltrates.   2.  Cardiomegaly           Assessment/Plan  * Cellulitis  Assessment & Plan  Severe cellulitis of bilateral lower extremities  Plan to continue cefazolin tentatively through 11/15/2020  Vascular surgery following  Continue supportive care  s/p Left through knee amputation on 11/4 with follow up to AKA 11/12/20  Right LE cellulitis, continue abx and wound  care  Snf/ rehab pending    Sepsis ([facility])  Assessment & Plan  Etiology bilateral cellulitis  resolving  continue Cefazolin, and per ID at least a 2-week antibiotic course due to the bacteremia, tentative end on 11/15/2020   ID following      Paroxysmal atrial fibrillation ([facility])- (present on admission)  Assessment & Plan  Rate controlled on metoprolol, amiodarone  [id number]: 4 points; HASBLED: 2 points.  Continue amiodarone  following    LINSEY (acute kidney injury) ([facility])  Assessment & Plan  Resolved  Etiology likely ATN from sepsis   Continue to follow    Elevated troponin- (present on admission)  Assessment & Plan  no acute ischemic changes on EKG  Seen by cardiology  No chest pain  Likely related to demand ischemia, Type 2 MI, in the setting of heart failure and sepsis  11/2/20: Repeat ECHO - EF 50%, RVSP 40, mild AS    Cigarette nicotine dependence with nicotine-induced disorder- (present on admission)  Assessment & Plan  Cessation discussed and recommended    Heart failure with preserved ejection fraction, borderline, class IV ([facility])- (present on admission)  Assessment & Plan  Acute due to sepsis  Continue supportive care      Type 2 diabetes mellitus with complication, without long-term current use of insulin ([facility])- (present on admission)  Assessment & Plan  HbA1C 7.8  Continue ISS  Continue hypoglycemia protocol    COPD (chronic obstructive pulmonary disease) ([facility])  Assessment & Plan  No sign of acute exacerbation  Currently stable on RA  Continue to follow    Hyponatremia  Assessment & Plan  decreasing  Continue to follow         VTE prophylaxis: heparin

## 2020-11-16 VITALS
TEMPERATURE: 97.3 F | OXYGEN SATURATION: 96 % | HEART RATE: 80 BPM | SYSTOLIC BLOOD PRESSURE: 128 MMHG | BODY MASS INDEX: 37.19 KG/M2 | WEIGHT: 315 LBS | DIASTOLIC BLOOD PRESSURE: 58 MMHG | RESPIRATION RATE: 18 BRPM | HEIGHT: 77 IN

## 2020-11-16 PROBLEM — A41.9 SEPSIS (HCC): Status: RESOLVED | Noted: 2020-10-30 | Resolved: 2020-11-16

## 2020-11-16 PROBLEM — N17.9 AKI (ACUTE KIDNEY INJURY) (HCC): Status: RESOLVED | Noted: 2017-04-30 | Resolved: 2020-11-16

## 2020-11-16 LAB
ALBUMIN SERPL BCP-MCNC: 2.5 G/DL (ref 3.2–4.9)
ALBUMIN/GLOB SERPL: 0.6 G/DL
ALP SERPL-CCNC: 104 U/L (ref 30–99)
ALT SERPL-CCNC: 22 U/L (ref 2–50)
ANION GAP SERPL CALC-SCNC: 6 MMOL/L (ref 7–16)
ANISOCYTOSIS BLD QL SMEAR: ABNORMAL
AST SERPL-CCNC: 47 U/L (ref 12–45)
BASOPHILS # BLD AUTO: 1.7 % (ref 0–1.8)
BASOPHILS # BLD: 0.18 K/UL (ref 0–0.12)
BILIRUB SERPL-MCNC: 0.3 MG/DL (ref 0.1–1.5)
BUN SERPL-MCNC: 18 MG/DL (ref 8–22)
CALCIUM SERPL-MCNC: 8.2 MG/DL (ref 8.5–10.5)
CHLORIDE SERPL-SCNC: 99 MMOL/L (ref 96–112)
CO2 SERPL-SCNC: 29 MMOL/L (ref 20–33)
CREAT SERPL-MCNC: 0.69 MG/DL (ref 0.5–1.4)
EOSINOPHIL # BLD AUTO: 0.09 K/UL (ref 0–0.51)
EOSINOPHIL NFR BLD: 0.9 % (ref 0–6.9)
ERYTHROCYTE [DISTWIDTH] IN BLOOD BY AUTOMATED COUNT: 67.7 FL (ref 35.9–50)
GLOBULIN SER CALC-MCNC: 4.2 G/DL (ref 1.9–3.5)
GLUCOSE BLD-MCNC: 117 MG/DL (ref 65–99)
GLUCOSE BLD-MCNC: 190 MG/DL (ref 65–99)
GLUCOSE SERPL-MCNC: 132 MG/DL (ref 65–99)
HCT VFR BLD AUTO: 27.3 % (ref 42–52)
HGB BLD-MCNC: 8.2 G/DL (ref 14–18)
LYMPHOCYTES # BLD AUTO: 0.81 K/UL (ref 1–4.8)
LYMPHOCYTES NFR BLD: 7.8 % (ref 22–41)
MACROCYTES BLD QL SMEAR: ABNORMAL
MANUAL DIFF BLD: NORMAL
MCH RBC QN AUTO: 30.1 PG (ref 27–33)
MCHC RBC AUTO-ENTMCNC: 30 G/DL (ref 33.7–35.3)
MCV RBC AUTO: 100.4 FL (ref 81.4–97.8)
MONOCYTES # BLD AUTO: 0.54 K/UL (ref 0–0.85)
MONOCYTES NFR BLD AUTO: 5.2 % (ref 0–13.4)
MORPHOLOGY BLD-IMP: NORMAL
MYELOCYTES NFR BLD MANUAL: 0.9 %
NEUTROPHILS # BLD AUTO: 8.68 K/UL (ref 1.82–7.42)
NEUTROPHILS NFR BLD: 83.5 % (ref 44–72)
NRBC # BLD AUTO: 0 K/UL
NRBC BLD-RTO: 0 /100 WBC
PLATELET # BLD AUTO: 288 K/UL (ref 164–446)
PLATELET BLD QL SMEAR: NORMAL
PMV BLD AUTO: 10.4 FL (ref 9–12.9)
POIKILOCYTOSIS BLD QL SMEAR: NORMAL
POLYCHROMASIA BLD QL SMEAR: NORMAL
POTASSIUM SERPL-SCNC: 4.5 MMOL/L (ref 3.6–5.5)
PROT SERPL-MCNC: 6.7 G/DL (ref 6–8.2)
RBC # BLD AUTO: 2.72 M/UL (ref 4.7–6.1)
RBC BLD AUTO: PRESENT
SODIUM SERPL-SCNC: 134 MMOL/L (ref 135–145)
STOMATOCYTES BLD QL SMEAR: NORMAL
WBC # BLD AUTO: 10.4 K/UL (ref 4.8–10.8)

## 2020-11-16 PROCEDURE — 700111 HCHG RX REV CODE 636 W/ 250 OVERRIDE (IP): Performed by: STUDENT IN AN ORGANIZED HEALTH CARE EDUCATION/TRAINING PROGRAM

## 2020-11-16 PROCEDURE — 700102 HCHG RX REV CODE 250 W/ 637 OVERRIDE(OP): Performed by: STUDENT IN AN ORGANIZED HEALTH CARE EDUCATION/TRAINING PROGRAM

## 2020-11-16 PROCEDURE — 80053 COMPREHEN METABOLIC PANEL: CPT

## 2020-11-16 PROCEDURE — 99239 HOSP IP/OBS DSCHRG MGMT >30: CPT | Performed by: HOSPITALIST

## 2020-11-16 PROCEDURE — 99232 SBSQ HOSP IP/OBS MODERATE 35: CPT | Performed by: INTERNAL MEDICINE

## 2020-11-16 PROCEDURE — 94640 AIRWAY INHALATION TREATMENT: CPT

## 2020-11-16 PROCEDURE — 700102 HCHG RX REV CODE 250 W/ 637 OVERRIDE(OP): Performed by: HOSPITALIST

## 2020-11-16 PROCEDURE — A9270 NON-COVERED ITEM OR SERVICE: HCPCS | Performed by: HOSPITALIST

## 2020-11-16 PROCEDURE — 85027 COMPLETE CBC AUTOMATED: CPT

## 2020-11-16 PROCEDURE — A9270 NON-COVERED ITEM OR SERVICE: HCPCS | Performed by: STUDENT IN AN ORGANIZED HEALTH CARE EDUCATION/TRAINING PROGRAM

## 2020-11-16 PROCEDURE — 700101 HCHG RX REV CODE 250: Performed by: STUDENT IN AN ORGANIZED HEALTH CARE EDUCATION/TRAINING PROGRAM

## 2020-11-16 PROCEDURE — 700111 HCHG RX REV CODE 636 W/ 250 OVERRIDE (IP): Performed by: INTERNAL MEDICINE

## 2020-11-16 PROCEDURE — 700111 HCHG RX REV CODE 636 W/ 250 OVERRIDE (IP): Performed by: HOSPITALIST

## 2020-11-16 PROCEDURE — 82962 GLUCOSE BLOOD TEST: CPT | Mod: 91

## 2020-11-16 PROCEDURE — 36415 COLL VENOUS BLD VENIPUNCTURE: CPT

## 2020-11-16 PROCEDURE — 97530 THERAPEUTIC ACTIVITIES: CPT

## 2020-11-16 PROCEDURE — 85007 BL SMEAR W/DIFF WBC COUNT: CPT

## 2020-11-16 RX ADMIN — Medication 400 MG: at 04:12

## 2020-11-16 RX ADMIN — IPRATROPIUM BROMIDE AND ALBUTEROL SULFATE 3 ML: .5; 3 SOLUTION RESPIRATORY (INHALATION) at 15:25

## 2020-11-16 RX ADMIN — GABAPENTIN 300 MG: 300 CAPSULE ORAL at 12:10

## 2020-11-16 RX ADMIN — HYDROCODONE BITARTRATE AND ACETAMINOPHEN 1 TABLET: 10; 325 TABLET ORAL at 06:21

## 2020-11-16 RX ADMIN — ALBUTEROL SULFATE 2 PUFF: 90 AEROSOL, METERED RESPIRATORY (INHALATION) at 03:20

## 2020-11-16 RX ADMIN — AMIODARONE HYDROCHLORIDE 200 MG: 200 TABLET ORAL at 04:12

## 2020-11-16 RX ADMIN — HEPARIN SODIUM 5000 UNITS: 5000 INJECTION, SOLUTION INTRAVENOUS; SUBCUTANEOUS at 04:11

## 2020-11-16 RX ADMIN — GLYCOPYRROLATE 1 CAPSULE: 15.6 CAPSULE RESPIRATORY (INHALATION) at 10:02

## 2020-11-16 RX ADMIN — ALBUTEROL SULFATE 2 PUFF: 90 AEROSOL, METERED RESPIRATORY (INHALATION) at 07:32

## 2020-11-16 RX ADMIN — HYDROMORPHONE HYDROCHLORIDE 0.5 MG: 1 INJECTION, SOLUTION INTRAMUSCULAR; INTRAVENOUS; SUBCUTANEOUS at 11:02

## 2020-11-16 RX ADMIN — GABAPENTIN 300 MG: 300 CAPSULE ORAL at 04:12

## 2020-11-16 RX ADMIN — CEFAZOLIN SODIUM 2 G: 2 INJECTION, SOLUTION INTRAVENOUS at 04:11

## 2020-11-16 RX ADMIN — HYDROCODONE BITARTRATE AND ACETAMINOPHEN 1 TABLET: 10; 325 TABLET ORAL at 15:44

## 2020-11-16 RX ADMIN — DOCUSATE SODIUM 50 MG AND SENNOSIDES 8.6 MG 2 TABLET: 8.6; 5 TABLET, FILM COATED ORAL at 04:12

## 2020-11-16 ASSESSMENT — ENCOUNTER SYMPTOMS
WEAKNESS: 0
RESPIRATORY NEGATIVE: 1
FEVER: 0
FOCAL WEAKNESS: 0
NEUROLOGICAL NEGATIVE: 1
MYALGIAS: 0
GASTROINTESTINAL NEGATIVE: 1
COUGH: 0
CONSTIPATION: 0
PALPITATIONS: 0
CHILLS: 0
VOMITING: 0
EYES NEGATIVE: 1
CARDIOVASCULAR NEGATIVE: 1
DEPRESSION: 0
BRUISES/BLEEDS EASILY: 0
DIAPHORESIS: 0
NAUSEA: 0
MYALGIAS: 1
WEIGHT LOSS: 0
DIZZINESS: 0
DIARRHEA: 0
HEADACHES: 0
BACK PAIN: 1
BLURRED VISION: 0
SHORTNESS OF BREATH: 0
ABDOMINAL PAIN: 0
SORE THROAT: 0
CONSTITUTIONAL NEGATIVE: 1
PSYCHIATRIC NEGATIVE: 1

## 2020-11-16 ASSESSMENT — LIFESTYLE VARIABLES: SUBSTANCE_ABUSE: 0

## 2020-11-16 ASSESSMENT — PAIN DESCRIPTION - PAIN TYPE: TYPE: ACUTE PAIN;SURGICAL PAIN

## 2020-11-16 ASSESSMENT — COGNITIVE AND FUNCTIONAL STATUS - GENERAL
CLIMB 3 TO 5 STEPS WITH RAILING: TOTAL
TURNING FROM BACK TO SIDE WHILE IN FLAT BAD: UNABLE
WALKING IN HOSPITAL ROOM: TOTAL
MOVING TO AND FROM BED TO CHAIR: UNABLE
MOBILITY SCORE: 6
MOVING FROM LYING ON BACK TO SITTING ON SIDE OF FLAT BED: UNABLE
SUGGESTED CMS G CODE MODIFIER MOBILITY: CN
STANDING UP FROM CHAIR USING ARMS: TOTAL

## 2020-11-16 ASSESSMENT — GAIT ASSESSMENTS: GAIT LEVEL OF ASSIST: UNABLE TO PARTICIPATE

## 2020-11-16 NOTE — DISCHARGE PLANNING
Agency/Facility Name: [facility]  Referral sent per [employer] form @ 1438 manually faxed    P: [phone]  F: [phone]    Agency/Facility Name: [facility]  Outcome: Short staffed not accepting admissions at this time.

## 2020-11-16 NOTE — THERAPY
"Physical Therapy   Daily Treatment     Patient Name: Joshua Medrano  Age:  54 y.o., Sex:  male  Medical Record #: [id number]  Today's Date: 11/16/2020     Precautions: Fall Risk    Assessment    Agreeable to work w/ PT, then t/o session has multiple excuses as to why he can't participate.  He states \"wait a minute, wait a minute\" throughout.  Even w/ the slightest movement.  He needs mod assist to sit eob, despite using siderails.  Once sitting, again, w/ many stalling excuses.  When verbally encouraged to attempt standing in order to change his sheets he refused all further therapy.  Slow to progress.    Plan    Continue current treatment plan.    DC Equipment Recommendations: Unable to determine at this time  Discharge Recommendations: Recommend post-acute placement for additional physical therapy services prior to discharge home        Objective       11/16/20 1101   Balance   Sitting Balance (Static) Fair   Sitting Balance (Dynamic) Fair   Standing Balance (Static)   (Refused to attempt)   Weight Shift Sitting Poor   Weight Shift Standing Absent   Skilled Intervention Verbal Cuing;Tactile Cuing;Facilitation   Gait Analysis   Gait Level Of Assist Unable to Participate   Bed Mobility    Supine to Sit Moderate Assist   Sit to Supine Maximal Assist   Skilled Intervention Verbal Cuing;Tactile Cuing;Sequencing;Facilitation   Comments w/ rails   Functional Mobility   Sit to Stand Refused   Bed, Chair, Wheelchair Transfer Unable to Participate   Skilled Intervention Verbal Cuing   Short Term Goals    Short Term Goal # 1 Pt will perform bed mobility with min A in 6 visits.   Goal Outcome # 1 goal not met   Short Term Goal # 2 Pt will sit, self supported at EOB with supervision x 5 min in 6 visits to prepare for slide board transfers.    Goal Outcome # 2 Goal met   Short Term Goal # 3 Pt will scoot along EOB with mod A in 6 visits to prepare for slide board transfer.    Goal Outcome # 3 Goal not met   Anticipated Discharge " Equipment and Recommendations   DC Equipment Recommendations Unable to determine at this time   Discharge Recommendations Recommend post-acute placement for additional physical therapy services prior to discharge home

## 2020-11-16 NOTE — PROGRESS NOTES
Hospital Medicine Daily Progress Note    Date of Service  11/16/2020    Chief Complaint  54 y.o. male admitted 10/29/2020 with worsening bilateral leg wounds    Hospital Course  Patient is a 54 year old male with a history of  DM, a. fib (on  Chronic anticoagulation), CAD (s/p CABG), SHASHANK, previous nicotine use and COPD who presented with a chief complaint of worsening bilateral lower extremity swelling,and pain. His symptoms had been worseing for several weeks prior to admission.  He was admitted for treatment of suspected necrotizing cellulitis and on 11/1/20 he was transferred to the ICU for sepsis with hypotension, hyponatremia, acute renal failure, acute systolic heart failure and hypoxia.  He was found to have group A strep bacteremia with severe PAD.    On 11/4/20 he underwent a left sided guillotine through the knee amputation with Dr. Durbin.    Interval Problem Update  He reports surgical site pain well controlled currently 4/10, denies right LE pain.  He does report that the skin tears on his back and buttocks are painful and burning  He continues to state that he will leave A if we do not get to to rehab in [place] today, however he remains a two person assist - discussed with case management and they are working on d/c acceptance to snf or acute rehab  Calcium when corrected for albumin is within normal limits  He denies sob, no chest pain, ros otherwise negative  Discussed with nursing and case management, also discussed with ID    Consultants/Specialty  Vascular surgery Alex  Critical care  Orthopedic surgery Ramakrishna BENITEZ  Cardiology    Code Status  Full Code    Disposition  Rehab/ snf when accepted    Review of Systems  Review of Systems   Constitutional: Negative.  Negative for chills, diaphoresis, fever, malaise/fatigue and weight loss.   HENT: Negative.  Negative for sore throat.    Eyes: Negative.  Negative for blurred vision.   Respiratory: Negative.  Negative for cough and shortness of breath.     Cardiovascular: Negative.  Negative for chest pain, palpitations and leg swelling.   Gastrointestinal: Negative.  Negative for abdominal pain, nausea and vomiting.   Genitourinary: Negative.  Negative for dysuria.   Musculoskeletal: Positive for back pain and joint pain. Negative for myalgias.   Skin: Negative.  Negative for itching and rash.   Neurological: Negative.  Negative for dizziness, focal weakness, weakness and headaches.   Endo/Heme/Allergies: Negative.  Does not bruise/bleed easily.   Psychiatric/Behavioral: Negative.  Negative for depression, substance abuse and suicidal ideas.   All other systems reviewed and are negative.       Physical Exam  Temp:  [36.3 °C (97.3 °F)-36.6 °C (97.8 °F)] 36.3 °C (97.3 °F)  Pulse:  [71-79] 79  Resp:  [16-18] 16  BP: (106-129)/(58-69) 128/58  SpO2:  [94 %-100 %] 96 %    Physical Exam  Vitals signs and nursing note reviewed. Exam conducted with a chaperone present.   Constitutional:       General: He is not in acute distress.     Appearance: Normal appearance. He is not diaphoretic.   HENT:      Head: Normocephalic.      Nose: Nose normal.      Mouth/Throat:      Mouth: Mucous membranes are moist.   Eyes:      Pupils: Pupils are equal, round, and reactive to light.   Cardiovascular:      Rate and Rhythm: Normal rate and regular rhythm.      Pulses: Normal pulses.      Heart sounds: Normal heart sounds.   Pulmonary:      Effort: Pulmonary effort is normal.      Breath sounds: Normal breath sounds.   Abdominal:      General: Abdomen is flat. Bowel sounds are normal.      Palpations: Abdomen is soft.      Comments: Umbilical hernia reducible   Musculoskeletal: Normal range of motion.         General: No swelling or deformity.      Comments: R leg cellulltis stabe  Left aka site cdi with hemovac in place - minimal output   Skin:     General: Skin is warm and dry.      Capillary Refill: Capillary refill takes less than 2 seconds.      Comments: Erythema and stage 1 wounds  back and buttocks   Neurological:      General: No focal deficit present.      Mental Status: He is alert and oriented to person, place, and time.      Cranial Nerves: No cranial nerve deficit.   Psychiatric:         Mood and Affect: Affect is angry. Affect is not blunt or inappropriate.         Behavior: Behavior is not aggressive.         Thought Content: Thought content does not include homicidal ideation.         Judgment: Judgment is not inappropriate.         Fluids    Intake/Output Summary (Last 24 hours) at 11/16/2020 1231  Last data filed at 11/16/2020 0900  Gross per 24 hour   Intake 720 ml   Output 400 ml   Net 320 ml       Laboratory  Recent Labs     11/14/20  0524 11/15/20  0327 11/16/20  0438   WBC 11.4* 10.7 10.4   RBC 2.66* 2.66* 2.72*   HEMOGLOBIN 8.2* 8.0* 8.2*   HEMATOCRIT 26.3* 26.3* 27.3*   MCV 98.9* 98.9* 100.4*   MCH 30.8 30.1 30.1   MCHC 31.2* 30.4* 30.0*   RDW 64.2* 65.6* 67.7*   PLATELETCT 248 248 288   MPV 10.2 10.1 10.4     Recent Labs     11/14/20  0524 11/15/20  0327 11/16/20  0438   SODIUM 130* 128* 134*   POTASSIUM 4.0 4.5 4.5   CHLORIDE 95* 95* 99   CO2 29 30 29   GLUCOSE 204* 166* 132*   BUN 21 20 18   CREATININE 0.73 0.76 0.69   CALCIUM 8.0* 8.1* 8.2*                   Imaging  EC-ECHOCARDIOGRAM LTD W/ CONT   Final Result      DX-CHEST-LIMITED (1 VIEW)   Final Result      1.  Right internal jugular catheter is been placed and appears appropriately located      2.  Moderate pulmonary edema      3.  Enlarged cardiac silhouette      CT-EXTREMITY, LOWER W/O RIGHT   Final Result      1.  No discrete fluid collection is identified to suggest abscess.      2.  Diffuse subcutaneous edema and overlying skin thickening with venous varicosities.      3.  Atherosclerosis.      4.  Diffuse muscle atrophy.      CT-EXTREMITY, LOWER W/O LEFT   Final Result      1.  Left leg cellulitis without abscess or soft tissue gas      2.  Small vessel atherosclerotic plaque      3.  osteoarthritis of the  left knee and left midfoot      4.  Muscular atrophy      EC-ECHOCARDIOGRAM COMPLETE W/O CONT   Final Result      DX-CHEST-PORTABLE (1 VIEW)   Final Result         1.  Pulmonary edema and/or infiltrates are identified, which are stable since the prior exam.   2.  Cardiomegaly      US-EXTREMITY ARTERY LOWER BILAT   Final Result      US-EXTREMITY VENOUS LOWER BILAT   Final Result      DX-TIBIA AND FIBULA RIGHT   Final Result         1.  No acute traumatic bony injury.   2.  Tricompartmental degenerative changes of the knee   3.  Atherosclerosis      DX-TIBIA AND FIBULA LEFT   Final Result   Addendum 1 of 1   Addendum: Subtle clustered punctate lucencies lateral to the ankle are    seen which could represent tiny foci of soft tissue gas.      These findings were discussed with the patient's clinician, ELIZABETH HILARIO,    on 10/30/2020 12:34 AM.      Final      DX-CHEST-PORTABLE (1 VIEW)   Final Result         1.  Interstitial pulmonary parenchymal prominence, compatible with interstitial edema and/or infiltrates.   2.  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  Continue amiodarone  following    LINSEY (acute kidney injury) (HCC)  Assessment & Plan  Resolved  Etiology likely ATN from sepsis   Continue to follow    Elevated troponin- (present on admission)  Assessment & Plan  no acute  ischemic changes on EKG  Seen by cardiology  No chest pain  Likely related to demand ischemia, Type 2 MI, in the setting of heart failure and sepsis  11/2/20: Repeat ECHO - EF 50%, RVSP 40, mild AS    Cigarette nicotine dependence with nicotine-induced disorder- (present on admission)  Assessment & Plan  Cessation discussed and recommended    Heart failure with preserved ejection fraction, borderline, class IV ([facility])- (present on admission)  Assessment & Plan  Acute due to sepsis  Continue supportive care      Type 2 diabetes mellitus with complication, without long-term current use of insulin ([facility])- (present on admission)  Assessment & Plan  HbA1C 7.8  Continue ISS  Continue hypoglycemia protocol    COPD (chronic obstructive pulmonary disease) ([facility])  Assessment & Plan  Remains without signs of acute exacerbation  Remains stable on RA  Continue to follow    Hyponatremia  Assessment & Plan  Nearly resolved  Some fluctuation  Continue to follow         VTE prophylaxis: heparin

## 2020-11-16 NOTE — CARE PLAN
Problem: Pain Management  Goal: Pain level will decrease to patient's comfort goal  Outcome: PROGRESSING AS EXPECTED     Problem: Psychosocial Needs:  Goal: Level of anxiety will decrease  Outcome: PROGRESSING AS EXPECTED

## 2020-11-16 NOTE — PROGRESS NOTES
Infectious Disease Progress Note    Author: Enid Braun M.D. Date & Time of service: 11/16/2020  8:54 AM    Chief Complaint:  Lower extremity cellulitis and necrotic wounds       Interval History:  54-year-old male with known coronary artery disease, atrial fibrillation on chronic anticoagulation, diabetes, who was admitted on 10/29/2020 due to worsening bilateral lower extremity pain, swelling, erythema and necrotic wounds  10/31 AF WBC 22 increased O2 but states less SOB Remains with tender and swollen BLE left greater than right-sloughing eschars  11/1 AF WBC 21.9 transferred to ICU-down to nasal cannula- BLE edema improved somewhat. Denies SE abx-Vascular note-patient declining surgery  11/2 AF, O2 10 L, increased, WBC 24.8, dressing change today by nurses, extremely tender to palpation particularly right lower leg.    11/3 AF, O2 4 L NC- improved today, WBC 25.5, has agreed to surgery and plan is for AKA on the left soon and then potential debridement or amputation of right in the future.  Patient continues to have significant pain swelling and redness in both legs particularly on the left.  11/5  AF, O2 4 L NC,  WBC 23.6, status post amputation of the left leg.  He is reporting some ongoing pain at the site and continued pain and swelling of his right leg.     11/9 afebrile WBC 13.1 right lower extremity examined with wound care nurse.  Overall improved.  11/16 afebrile WBC 10.4.  ID reconsulted due to concerns for worsening right lower extremity.  Patient complaining of back pain.  Right lower extremity examined without any signs of active infection.  It appears improved from last week.    Review of Systems:  Review of Systems   Constitutional: Negative for chills and fever.   Respiratory: Negative for cough and shortness of breath.    Gastrointestinal: Negative for abdominal pain, constipation, diarrhea, nausea and vomiting.   Musculoskeletal: Positive for myalgias.       Hemodynamics:  Temp (24hrs),  Av.4 °C (97.6 °F), Min:36.3 °C (97.3 °F), Max:36.6 °C (97.8 °F)  Temperature: 36.3 °C (97.3 °F)  Pulse  Av.1  Min: 64  Max: 103   Blood Pressure: 128/58       Physical Exam:  Physical Exam  Constitutional:       Appearance: Normal appearance. He is obese.   Cardiovascular:      Rate and Rhythm: Regular rhythm.      Heart sounds: Normal heart sounds.   Pulmonary:      Effort: Pulmonary effort is normal.      Breath sounds: Normal breath sounds.   Abdominal:      General: Abdomen is flat. There is no distension.      Palpations: Abdomen is soft.   Musculoskeletal:      Comments: Left left BKA site dressed.  Hemovac in place    Right lower extremity with areas of skin loss and superficial areas of darkened tissue.  No erythema, not warm to palpation.  No evidence of active infection.   Skin:     General: Skin is warm and dry.   Neurological:      General: No focal deficit present.      Mental Status: He is alert and oriented to person, place, and time.   Psychiatric:         Mood and Affect: Mood normal.         Behavior: Behavior normal.         Meds:    Current Facility-Administered Medications:   •  HYDROmorphone  •  albuterol  •  HYDROcodone/acetaminophen  •  insulin regular **AND** POC Blood Glucose **AND** NOTIFY MD and PharmD **AND** glucose **AND** dextrose 50%  •  heparin  •  magnesium oxide  •  gabapentin  •  lidocaine **OR** lidocaine  •  pneumococcal vaccine  •  influenza vaccine quad  •  rosuvastatin  •  amiodarone  •  ipratropium-albuterol  •  glycopyrrolate  •  senna-docusate **AND** polyethylene glycol/lytes **AND** magnesium hydroxide **AND** bisacodyl  •  Respiratory Therapy Consult  •  cloNIDine  •  acetaminophen    Labs:  Recent Labs     20  0524 11/15/20  0327 20  0438   WBC 11.4* 10.7 10.4   RBC 2.66* 2.66* 2.72*   HEMOGLOBIN 8.2* 8.0* 8.2*   HEMATOCRIT 26.3* 26.3* 27.3*   MCV 98.9* 98.9* 100.4*   MCH 30.8 30.1 30.1   RDW 64.2* 65.6* 67.7*   PLATELETCT 248 248 288   MPV 10.2  10.1 10.4   NEUTSPOLYS 87.80* 90.20* 83.50*   LYMPHOCYTES 4.30* 4.50* 7.80*   MONOCYTES 2.60 1.80 5.20   EOSINOPHILS 2.60 2.70 0.90   BASOPHILS 1.70 0.90 1.70   RBCMORPHOLO Present Present Present     Recent Labs     11/14/20  0524 11/15/20  0327 11/16/20  0438   SODIUM 130* 128* 134*   POTASSIUM 4.0 4.5 4.5   CHLORIDE 95* 95* 99   CO2 29 30 29   GLUCOSE 204* 166* 132*   BUN 21 20 18     Recent Labs     11/14/20 0524 11/15/20  0327 11/16/20  0438   ALBUMIN  --   --  2.5*   TBILIRUBIN  --   --  0.3   ALKPHOSPHAT  --   --  104*   TOTPROTEIN  --   --  6.7   ALTSGPT  --   --  22   ASTSGOT  --   --  47*   CREATININE 0.73 0.76 0.69       Imaging:  Ct-extremity, Lower W/o Right    Result Date: 10/31/2020  10/31/2020 3:13 PM HISTORY/REASON FOR EXAM:  Lower leg swelling/redness, cellulitis suspected. TECHNIQUE/EXAM DESCRIPTION AND NUMBER OF VIEWS: CT scan of the RIGHT lower extremity without contrast and including reconstructions. Thin-section noncontrast helical images were obtained. Coronal and sagittal reconstructions were generated from the axial images. Up to date radiation dose reduction adjustments have been utilized to meet ALARA standards for radiation dose reduction. COMPARISON: X-ray tibia and fibula 10/29/2020 FINDINGS: There is joint space narrowing with sclerosis and osteophyte formation in the lateral compartment of the knee. No definite bony destruction is identified. There is marked edema in the subcutaneous tissues of the right lower leg with slight thickening of the overlying skin. There are varicosities. There is arterial calcification. No well-defined fluid collection is identified to suggest abscess. There is diffuse muscle atrophy.     1.  No discrete fluid collection is identified to suggest abscess. 2.  Diffuse subcutaneous edema and overlying skin thickening with venous varicosities. 3.  Atherosclerosis. 4.  Diffuse muscle atrophy.    Ct-extremity, Lower W/o Left    Result Date:  10/31/2020  10/31/2020 2:58 PM HISTORY/REASON FOR EXAM:  Lower leg swelling/redness, cellulitis suspected; Bilateral. TECHNIQUE/EXAM DESCRIPTION AND NUMBER OF VIEWS: CT scan of the LEFT lower extremity without contrast and including reconstructions. Thin-section noncontrast helical images were obtained. Coronal and sagittal reconstructions were generated from the axial images. Up to date radiation dose reduction adjustments have been utilized to meet ALARA standards for radiation dose reduction. COMPARISON: None. FINDINGS: Distal femur appears normal There is osteoarthritis of the left knee joint. Tibia and fibula are intact. There is no soft tissue gas. There is extensive atherosclerotic plaque There is diffuse muscular atrophy There is cutaneous and subcutaneous edema consistent with cellulitis. There are no discrete fluid collection. There is osteoarthritis of the midfoot.     1.  Left leg cellulitis without abscess or soft tissue gas 2.  Small vessel atherosclerotic plaque 3.  osteoarthritis of the left knee and left midfoot 4.  Muscular atrophy    Dx-chest-limited (1 View)    Result Date: 11/1/2020 11/1/2020 4:42 PM HISTORY/REASON FOR EXAM:  CVL placement. Central line placement TECHNIQUE/EXAM DESCRIPTION AND NUMBER OF VIEWS: Single AP view of the chest. COMPARISON:  1 view chest 10/31/2020 FINDINGS: Right internal jugular catheter has been placed. The tip projects appropriately over the superior vena cava. LUNGS: There are pulmonary interstitial and alveolar densities that are consistent with edema. HEART and MEDIASTINUM: enlarged. There has been previous sternotomy. Pleura: There are no pleural effusion or pneumothoraces. Osseous structures: No significant bony abnormality.     1.  Right internal jugular catheter is been placed and appears appropriately located 2.  Moderate pulmonary edema 3.  Enlarged cardiac silhouette    Dx-chest-portable (1 View)    Result Date: 10/31/2020    10/31/2020 6:58 AM  HISTORY/REASON FOR EXAM: Pleural Effusion TECHNIQUE/EXAM DESCRIPTION:  Single AP view of the chest. COMPARISON: October 29, 2020 FINDINGS: Overlying cardiac leads are present. Cardiomegaly is observed.  Postsurgical changes of sternotomy are noted. The mediastinal contour appears within normal limits.  The central  pulmonary vasculature appears prominent and indistinct. The lungs appear well expanded bilaterally.  Diffuse scattered hazy pulmonary parenchymal opacities are seen. No significant pleural effusions are identified. The bony structures appear age-appropriate.     1.  Pulmonary edema and/or infiltrates are identified, which are stable since the prior exam. 2.  Cardiomegaly    Dx-chest-portable (1 View)    Result Date: 10/30/2020    10/29/2020 11:32 PM HISTORY/REASON FOR EXAM: Shortness of Breath TECHNIQUE/EXAM DESCRIPTION:  Single AP view of the chest. COMPARISON: September 10, 2019 FINDINGS: Overlying cardiac leads are present. Cardiomegaly is observed.  Postsurgical changes of sternotomy are noted. The mediastinal contour appears within normal limits.  The central  pulmonary vasculature appears prominent and indistinct. The lungs appear well expanded bilaterally.  Hazy interstitial bilateral pulmonary opacities are seen. No significant pleural effusions are identified. The bony structures appear age-appropriate.     1.  Interstitial pulmonary parenchymal prominence, compatible with interstitial edema and/or infiltrates. 2.  Cardiomegaly    Dx-tibia And Fibula Left    Addendum Date: 10/30/2020    Addendum: Subtle clustered punctate lucencies lateral to the ankle are seen which could represent tiny foci of soft tissue gas. These findings were discussed with the patient's clinician, ELIZABETH HILARIO, on 10/30/2020 12:34 AM.    Result Date: 10/30/2020  10/29/2020 11:32 PM HISTORY/REASON FOR EXAM: Atraumatic Pain/Swelling/Deformity TECHNIQUE/EXAM DESCRIPTION:  AP and lateral views of the LEFT tibia and fibula.  COMPARISON:  None FINDINGS: Tricompartmental degenerative changes of the knee are seen. Bony structures and articulations appear within normal limits without visualized fracture, subluxation, or dislocation. Atherosclerotic changes are seen. Soft tissue phleboliths are seen. Soft tissue edema is noted.     1.  No acute traumatic bony injury. 2.  Tricompartmental degenerative changes of the knee. 3.  Atherosclerosis    Dx-tibia And Fibula Right    Result Date: 10/30/2020  10/29/2020 11:32 PM HISTORY/REASON FOR EXAM: Atraumatic Pain/Swelling/Deformity TECHNIQUE/EXAM DESCRIPTION:  AP and lateral views of the RIGHT tibia and fibula. COMPARISON:  None FINDINGS: Tricompartmental degenerative changes of the knee are seen, otherwise the bony structures and articulations appear within normal limits without visualized fracture, subluxation, or dislocation. Atherosclerotic changes are seen. Scattered soft tissue level associated.     1.  No acute traumatic bony injury. 2.  Tricompartmental degenerative changes of the knee 3.  Atherosclerosis    Us-extremity Artery Lower Bilat    Result Date: 10/30/2020  Lower Extremity  Arterial Duplex Report  Vascular Laboratory  CONCLUSIONS  1) Bilateral atherosclerosis  2) Right distal SFA 50-75% stenosis.  2) Monophasic waveforms in the left lower extremity  JARRETT WHITE  Exam Date:     10/30/2020 01:09  Room #:     Inpatient  Priority:     Stat  Ht (in):             Wt (lb):  Ordering Physician:        ELIZABETH HILARIO  Referring Physician:       ELIZABETH HILARIO  Sonographer:               Silvia Lassiter RVT, RDMS  Study Type:                Complete Bilateral  Technical Quality:         Adequate  Age:    54    Gender:     M  MRN:    [id number]  :    1965      BSA:  Indications:     Ulceration of LE  CPT Codes:       40139  ICD Codes:       707.1  History:         Nonhealing, raw and bleeding wounds bilaterally. Blue                   discoloration of limbs.  Severe pain bilaterally.  Limitations:     Pain.                RIGHT  Waveform        Peak Systolic Velocity (cm/s)                  Prox    Prox-Mid  Mid    Mid-Dist  Distal  Triphasic                         107                      CFA  Triphasic       110                                        PFA  Triphasic       96                89      214      79      SFA  Biphasic                          44                       POP  Biphasic                                           49      AT  Not                                                        PT  attained  Not                                                        BRITTON  attained                LEFT  Waveform        Peak Systolic Velocity (cm/s)                  Prox    Prox-Mid  Mid    Mid-Dist  Distal  Monophasic                        137              109     CFA  Biphasic        67                                         PFA  Monophasic      98                125                      SFA                                                             POP                                                             AT                                                             PT                                                             BRITTON  FINDINGS  Right.  There is atherosclerotic plaque seen throughout the limb.  Stenosis of the distal femoral artery. Velocities are consistent with 50-  75% stenosis.  Waveforms at the common femoral, profunda femoral and femoral artery are  triphasic.  Waveforms at the popliteal and distal anterior tibial artery are biphasic.  The remaining vessels were not properly visualized evaluated due to edema,  severe pain in non-healing bleeding wounds and large body habitus.  Left.  There is atherosclerotic plaque seen throughout the limb.  Waveforms at the common femoral, profunda femoral and femoral artery are  monophasic.  The remaining vessels of the left lower limb were not properly  visualized/evaluated due to edema, severe  pain in area of non-healing  bleeding wounds and large body habitus.  Nima Dill MD  (Electronically Signed)  Final Date:      2020                   03:22    Us-extremity Venous Lower Bilat    Result Date: 10/30/2020   Vascular Laboratory  CONCLUSIONS  1) Normal bilateral superficial and deep venous examination of the lower  extremities.  2) Lower extremity edema, limits diagnostic sensitivity of this exam  JARRETT WHITE  Exam Date:     10/30/2020 00:46  Room #:     Inpatient  Priority:     Stat  Ht (in):             Wt (lb):  Ordering Physician:        ELIZABETH HILARIO  Referring Physician:       YANELIS Machuca  Sonographer:               Silvia Lassiter RVT, RDMS  Study Type:                Complete Bilateral  Technical Quality:         Adequate  Age:    54    Gender:     M  MRN:    [id number]  :    1965      BSA:  Indications:     Localized swelling, mass and lump, lower limb, bilateral,                   Edema, unspecified, Ulceration of LE  CPT Codes:       01017  ICD Codes:       R22.43  R60.9  707.1  History:         Swelling of bilateral lower limbs. Edema. Nonhealing, raw and                   bleeding wounds bilaterally.  Limitations:     Edema, large body habitus and severe pain in limbs at touch  PROCEDURES:  Bilateral lower extremity venous duplex imaging.  The following venous structures were evaluated: common femoral, profunda  femoral, proximal greater saphenous, femoral, popliteal , peroneal and  posterior tibial veins.  Serial compression, augmentation maneuvers,  color and spectral Doppler  flow evaluations were performed.  FINDINGS:  Bilateral lower extremities  No superficial or deep venous thrombosis.  Complete color filling and compressibility with normal venous flow dynamics  including spontaneous flow, response to augmentation maneuvers, and  respiratory phasicity.  The peroneal and posterior tibial veins are difficult to assess for   compressibility and flow by color flow due to severe pain in limbs through  the nonhealing bleeding wounds and edema.  Interstitial fluid consistent with edema is observed in the thigh and below  the knee.  Edema reduces the image quality.  Nima Dill MD  (Electronically Signed)  Final Date:      2020                   03:19    Ec-echocardiogram Complete W/o Cont    Result Date: 10/31/2020  Transthoracic Echo Report Echocardiography Laboratory CONCLUSIONS Poor quality echo, consider repeating with contrast Probably normal LVEF Mild aortic stenosis. RVSP estimated at 30-35 mmHg. JARRETT WHITE Exam Date:         10/31/2020                    09:44 Exam Location:     Inpatient Priority:          Routine Ordering Physician:        MORIAH HUI Referring Physician: Sonographer:               Stalin Mtz RDCS Age:    54     Gender:    M MRN:    [id number] :    1965 BSA:    2.58   Ht (in):    77     Wt (lb):    280 Exam Type:     Complete Indications:     Heart Failure, Systolic ICD Codes:       428.2 CPT Codes:       79222 BP:   119    /   67     HR:   73 Technical Quality:       Technically difficult study                          incomplete information is                          obtained MEASUREMENTS  (Male / Female) Normal Values 2D ECHO LVOT Diameter                     2 cm                  DOPPLER AV Peak Velocity                  2.6 m/s               AV Peak Gradient                  27.9 mmHg             AV Mean Gradient                  17.7 mmHg             LVOT Peak Velocity                1.4 m/s               AV Area Cont Eq vti               1.6 cm2               MV Velocity Time Integral         41 cm                 Mitral E Point Velocity           1 m/s                 Mitral E to A Ratio               1.1                   Mitral A Duration                 96.9 ms               MV Pressure Half Time             102 ms                MV Area PHT                       2.2 cm2                MV Deceleration Time              352 ms                TR Peak Velocity                  258 cm/s              PV Peak Velocity                  1.3 m/s               PV Peak Gradient                  6.5 mmHg              PV Mean Gradient                  3.9 mmHg              * Indicates values subject to auto-interpretation LV EF:        % FINDINGS Left Ventricle Normal left ventricular chamber size. Normal left ventricular wall thickness. Unable to determine diastolic function. Reliable ejection fraction estimate cannot be made due to technical limitation. Right Ventricle Right ventricle not well visualized. Right Atrium Dilated inferior vena cava with inspiratory collapse. Left Atrium Left atrium not well visualized. Mitral Valve Mitral annular calcification. No stenosis or regurgitation seen. Aortic Valve The aortic valve is not well visualized. Mild aortic stenosis. Vmax is 2.5  m/s. Transvalvular gradients are - Peak: 26 mmHg, Mean: 18 mmHg. No aortic insufficiency. Tricuspid Valve Structurally normal tricuspid valve. No stenosis or regurgitation seen. RVSP estimated at 30-35 mmHg Pulmonic Valve Structurally normal pulmonic valve. No pulmonic stenosis. Mild pulmonic insufficiency. Pericardium Normal pericardium without effusion. Aorta Normal aortic root for body surface area. Ascending aorta diameter is 3.2 cm. Quinton Parkinson MD (Electronically Signed) Final Date:     31 October 2020                 12:02    Ec-echocardiogram Ltd W/ Cont    Result Date: 11/3/2020  Transthoracic Echo Report Echocardiography Laboratory CONCLUSIONS Compared to the images of the prior study done on 10/31/2020, no significant changes are noted. Left ventricular ejection fraction is visually estimated to be 50%. Mild aortic stenosis. Estimated right ventricular systolic pressure  is 40 mmHg. JARRETT WHITE Exam Date:         11/02/2020                    09:41 Exam Location:     Inpatient Priority:          Routine Ordering  Physician:        BHAVIN BUENO                             ([id number]) Referring Physician:       [id number] Torres Sonographer:               Analiadelgado [facility] Age:    54     Gender:    M MRN:    [id number] :    1965 BSA:    2.84   Ht (in):    77     Wt (lb):    350 Exam Type:     Limited, Contrast Indications:     Congestive Heart Failure ICD Codes:       428 CPT Codes:       14904, , 24379, 09248 BP:   113    /   74     HR: Technical Quality:       Fair MEASUREMENTS  (Male / Female) Normal Values 2D ECHO Estimated LV Ejection Fraction    50 %                  IVC Diameter                      2.2 cm                DOPPLER AV Peak Velocity                  2.7 m/s               AV Peak Gradient                  30.1 mmHg             AV Mean Gradient                  18.1 mmHg             LVOT Peak Velocity                1.3 m/s               MV Velocity Time Integral         35.8 cm               MV Pressure Half Time             80 ms                 MV Area PHT                       2.8 cm2               TR Peak Velocity                  299 cm/s              * Indicates values subject to auto-interpretation LV EF:  50    % FINDINGS Left Ventricle 3 mL of contrast was administered. Existing IV was used. Contrast was used to enhance visualization of the endocardial border. Left ventricular systolic function is low normal. Left ventricular ejection fraction is visually estimated to be 50%. Right Ventricle Right Atrium Left Atrium Mitral Valve Mild mitral stenosis. Mean transvalvular gradient is 3  mmHg at a heart rate of 78  BPM. Aortic Valve Mild aortic stenosis. Vmax is  2.6 m/s. Transvalvular gradients are - Peak: 29 mmHg, Mean: 17 mmHg. Tricuspid Valve Mild tricuspid regurgitation. Right atrial pressure is estimated to be 3 mmHg. Estimated right ventricular systolic pressure  is 40 mmHg. Pulmonic Valve Pericardium Normal pericardium without effusion. Aorta Jerry Reyes MD (Electronically  "Signed) Final Date:     03 November 2020                 12:41      Micro:  Results     Procedure Component Value Units Date/Time    SARS-CoV-2, PCR (In-House) [[id number]] Collected: 11/12/20 0714    Order Status: Completed Updated: 11/12/20 0732     SARS-CoV-2 Source Nasal Swab     SARS-CoV-2 (RdRp gene) NotDetected     Comment: PATIENTS: Important information regarding your results and instructions can  be found at [email or web address]   \"After your  Covid-19 Test\"  [facility] providers: PLEASE REFER TO DE-ESCALATION AND RETESTING PROTOCOL  on [email or web address]  **The [employer] ID Now COVID-19 Test has been made available for use under the  Emergency Use Authorization (EUA) only.         Narrative:      Collected By:[id number] TOBIN BUTLER  Preadmit COVID Request    COVID/SARS CoV-2 PCR [[id number]] Collected: 11/12/20 0714    Order Status: Completed Specimen: Respirate from Nasopharyngeal Updated: 11/12/20 0732     COVID Order Status Received     Comment: The order for SARS CoV-2 testing has been received by the  Laboratory. This result is neither positive nor negative.  Final results of testing will report in 24-48 hours, separately.         Narrative:      Collected By:[id number] TOBIN BUTLER  Preadmit COVID Request          Assessment:  Active Hospital Problems    Diagnosis   • *Cellulitis [L03.90]   • Right-sided heart failure ([facility]) [I50.810]   • Sepsis ([facility]) [A41.9]   • High anion gap metabolic acidosis [E87.2]   • Supratherapeutic INR [R79.1]   • Paroxysmal atrial fibrillation ([facility]) [I48.0]   • LINSEY (acute kidney injury) ([facility]) [N17.9]   • Polycythemia [D75.1]   • Elevated troponin [R77.8]   • Morbid obesity with BMI of 40.0-44.9, adult ([facility]) [E66.01, Z68.41]   • Heart failure with preserved ejection fraction, borderline, class IV ([facility]) [I50.30]   • Type 2 diabetes mellitus with complication, without long-term current use of insulin ([facility]) [E11.8]   • COPD (chronic obstructive " pulmonary disease) ([facility]) [J44.9]   • Hyponatremia [E87.1]   • Elevated LFTs [R79.89]     Assessment:  Hypoxia, improved  - chest x-ray with edema, no obvious pneumonia  Bacteremia, GAS  -Cultures from 10/29 and 10/30 with group A strep, clindamycin resistant, penicillin sensitive, 0.032, cefotaxime sensitive 0.023  -Cultures on 11/1 with no growth to date     Bilateral lower extremity necrotizing cellulitis and concern for development of necrotizing fasciitis-pain out of proportion on right and appears to be worsening area of edema erythema and pain on the left  -Wound cultures with GAS and MSSA, eliana stain with GPCs and rare GPRs  - Plan is for left side AKA -he has been refusing intervention but has now agreed for surgery  -CT lower extremity right without contrast on 10/31 without abscess or soft tissue gas.  CT lower extremity left with no discrete fluid collection to suggest abscess or noted air in the tissues.  -The patient went to the OR with Dr. Durbin on 11/4 for left guillotine through knee amputation-no cultures obtained  -Right leg still with necrotic areas and skin breakdown.  However, per photos he appears to be improving.   Leukocytosis, resolved  Acute kidney injury.  Resolved  Demand ischemia with elevation of his troponins and proBNP  Penicillin allergic, with anaphylaxis and rash, however this was as a child so unknown if still present - tolerated cefepime, ceftriaxone and cefazolin per notes   Diabetes     Plan:  --- Patient completed a 2-week course of IV cefazolin for bacteremia on 11/15/2020.  --- Right lower extremity overall improving.  No signs of acute infection on exam today.  --- Monitor and control blood sugars   --- Continue wound care for right lower extremity   --- Plan for definitive closure per Ortho     Plan of care discussed with hospitalist, Dr. Marsh.  Signing off.  Please reconsult if needed

## 2020-11-16 NOTE — WOUND TEAM
[facility]  Inpatient Services  Wound and Skin Care Progress Note    Admission Date: 10/29/2020     Last order of IP CONSULT TO WOUND CARE was found on 11/4/2020 from Hospital Encounter on 10/29/2020     HPI, PMH, SH: Reviewed    Unit where seen by Wound Team: [id number]     WOUND CONSULT/FOLLOW UP RELATED TO:  NPWT change to LLE     Self Report / Pain Level:  9/10 with removal of the VAC.  Premed with PO, IV dilaudid, and topical 4% lidocaine. Tolerated well.       OBJECTIVE:  Patient on regular redistribution with waffle overlay. NPWT to [facility] site. RLE dressing in place, elevated with pillows.     WOUND TYPE, LOCATION, CHARACTERISTICS (Pressure Injuries: location, stage, POA or date identified)    Wound 10/30/20 Full Thickness Wound Leg;Foot Circumferential Right POA (Active)   Wound Image    11/16/20 1200   Site Assessment Pink;Bleeding;Drainage 11/16/20 1200   Periwound Assessment Clean;Dry;Intact;Hemosiderin Staining 11/16/20 1200   Margins Defined edges 11/16/20 1200   Closure Secondary intention 11/16/20 1200   Drainage Amount Small 11/16/20 1200   Drainage Description Serosanguineous 11/16/20 1200   Treatments Cleansed;Site care 11/16/20 1200   Wound Cleansing Foam Cleanser/Washcloth 11/16/20 1200   Periwound Protectant Viscopaste 11/16/20 1200   Dressing Cleansing/Solutions Not Applicable 11/16/20 1200   Dressing Options Viscopaste;Absorbent Abdominal Pad;Dry Roll Gauze 11/16/20 1200   Dressing Changed Changed 11/16/20 1200   Dressing Status Clean;Dry;Intact 11/16/20 1200   Dressing Change/Treatment Frequency Daily, and As Needed 11/16/20 1200   NEXT Dressing Change/Treatment Date 11/17/20 11/16/20 1200   NEXT Weekly Photo (Inpatient Only) 11/23/20 11/16/20 1200   Non-staged Wound Description Full thickness 11/16/20 1200   Shape irregular scattered 11/16/20 1200   Wound Odor None 11/16/20 1200   Exposed Structures None 11/16/20 1200   Wound 11/04/20 Incision Leg Left Wound Vac  (Active)   Wound Image     Site Assessment Clean;Dry;Intact 11/16/20 1200   Periwound Assessment Clean;Dry;Intact 11/16/20 1200   Margins Attached edges 11/15/20 2000   Closure Staples;Approximated 11/16/20 1200   Drainage Amount Scant 11/16/20 1200   Drainage Description Serosanguineous 11/15/20 2000   Treatments Cleansed;Irrigation;Site care 11/11/20 1400   Wound Cleansing Normal Saline Irrigation 11/16/20 1200   Periwound Protectant Not Applicable 11/16/20 1200   Dressing Cleansing/Solutions Not Applicable 11/16/20 1200   Dressing Options Adaptic;Absorbent Abdominal Pad;Dry Roll Gauze;Ace Wrap 11/16/20 1200   Dressing Changed Changed 11/16/20 1200   Dressing Status Clean;Dry;Intact 11/16/20 1200   Dressing Change/Treatment Frequency Daily, and As Needed 11/16/20 1200   NEXT Dressing Change/Treatment Date 11/17/20 11/16/20 1200   NEXT Weekly Photo (Inpatient Only) 11/13/20 11/06/20 1130   Non-staged Wound Description Full thickness 11/09/20 1100   Wound Length (cm) 17.8 cm 11/06/20 1130   Wound Width (cm) 16 cm 11/06/20 1130   Wound Surface Area (cm^2) 284.8 cm^2 11/06/20 1130   Shape linear 11/16/20 1200   Wound Odor None 11/16/20 1200   Exposed Structures None 11/16/20 1200   WOUND NURSE ONLY - Time Spent with Patient (mins) 60 11/11/20 1400          Vascular:    ANNELIESE:   ANNELIESE Results, Last 30 Days Us-extremity Artery Lower Bilat    Result Date: 10/30/2020  Narrative Lower Extremity  Arterial Duplex Report  Vascular Laboratory  CONCLUSIONS  1) Bilateral atherosclerosis  2) Right distal SFA 50-75% stenosis.  2) Monophasic waveforms in the left lower extremity  WHITE, JIM  Exam Date:     10/30/2020 01:09  Room #:     Inpatient  Priority:     Stat  Ht (in):             Wt (lb):  Ordering Physician:        ELIZABETH HILARIO  Referring Physician:       ELIZABETH HILARIO  Sonographer:               Silvia Lassiter RVT,                             KELLYMS  Study Type:                Complete Bilateral  Technical Quality:          Adequate  Age:    54    Gender:     M  MRN:    [id number]  :    1965      BSA:  Indications:     Ulceration of LE  CPT Codes:       71358  ICD Codes:       707.1  History:         Nonhealing, raw and bleeding wounds bilaterally. Blue                   discoloration of limbs. Severe pain bilaterally.  Limitations:     Pain.                RIGHT  Waveform        Peak Systolic Velocity (cm/s)                  Prox    Prox-Mid  Mid    Mid-Dist  Distal  Triphasic                         107                      CFA  Triphasic       110                                        PFA  Triphasic       96                89      214      79      SFA  Biphasic                          44                       POP  Biphasic                                           49      AT  Not                                                        PT  attained  Not                                                        BRITTON  attained                LEFT  Waveform        Peak Systolic Velocity (cm/s)                  Prox    Prox-Mid  Mid    Mid-Dist  Distal  Monophasic                        137              109     CFA  Biphasic        67                                         PFA  Monophasic      98                125                      SFA                                                             POP                                                             AT                                                             PT                                                             BRITTON  FINDINGS  Right.  There is atherosclerotic plaque seen throughout the limb.  Stenosis of the distal femoral artery. Velocities are consistent with 50-  75% stenosis.  Waveforms at the common femoral, profunda femoral and femoral artery are  triphasic.  Waveforms at the popliteal and distal anterior tibial artery are biphasic.  The remaining vessels were not properly visualized evaluated due to edema,  severe pain in non-healing bleeding wounds  and large body habitus.  Left.  There is atherosclerotic plaque seen throughout the limb.  Waveforms at the common femoral, profunda femoral and femoral artery are  monophasic.  The remaining vessels of the left lower limb were not properly  visualized/evaluated due to edema, severe pain in area of non-healing  bleeding wounds and large body habitus.  Nima Dill MD  (Electronically Signed)  Final Date:      30 October 2020                   03:22      Lab Values:    Lab Results   Component Value Date/Time    WBC 10.4 11/16/2020 04:38 AM    RBC 2.72 (L) 11/16/2020 04:38 AM    HEMOGLOBIN 8.2 (L) 11/16/2020 04:38 AM    HEMATOCRIT 27.3 (L) 11/16/2020 04:38 AM    CREACTPROT 30.09 (H) 10/29/2020 10:38 PM    SEDRATEWES 0 10/30/2020 04:15 AM    HBA1C 7.8 (H) 10/30/2020 06:53 AM        Culture Results show:  Recent Results (from the past 720 hour(s))   CULTURE WOUND W/ GRAM STAIN    Collection Time: 10/30/20  1:03 AM    Specimen: Abscess; Wound   Result Value Ref Range    Significant Indicator POS (POS)     Source WND     Site Bilateral Lower Extremity     Culture Result Light growth mixed skin eloy. (A)     Gram Stain Result       Moderate Gram positive cocci.  Rare small Gram positive rods.      Culture Result Staphylococcus aureus  Heavy growth   (A)     Culture Result (A)      Beta Hemolytic Streptococcus group A  Heavy growth         Susceptibility    Staphylococcus aureus - JELANI     Azithromycin >4 Resistant mcg/mL     Clindamycin <=0.5 Sensitive mcg/mL     Cefazolin <=8 Sensitive mcg/mL     Ceftaroline <=0.5 Sensitive mcg/mL     Daptomycin <=1 Sensitive mcg/mL     Ampicillin/sulbactam <=8/4 Sensitive mcg/mL     Erythromycin >4 Resistant mcg/mL     Vancomycin 1 Sensitive mcg/mL     Oxacillin 0.5 Sensitive mcg/mL     Penicillin >8 Resistant mcg/mL     Pip/Tazobactam <=4 Sensitive mcg/mL     Trimeth/Sulfa <=0.5/9.5 Sensitive mcg/mL     Tetracycline <=4 Sensitive mcg/mL       INTERVENTIONS BY WOUND TEAM:  Patient and  chart reviewed. In to change Right LE dressings.   LLE: Removed ACE bandage and dressing.  Staples are approximated and intact.  Single layer adaptic to staples incisional, ABD, kerlix and ACE wrap.  Right LE circumferential: Warm washcloth to right LE to remove non-viable tissue and necrotic tissue.  Let air dry.  Viscopaste patch to right LE, ABD kerlix.        Interdisciplinary consultation: Patient, Bedside RN (Ina)    EVALUATION / RATIONALE FOR TREATMENT: patient admitted with sepsis and worsening BLE wounds. Patient RLE with multiple circumferential weeping wounds, full thickness, applied Viscopatch zinc impregnated gauze to encourage re-epithelialization of superficial 100% viable wound bed, and to provide a non-stick wound contact layer. Patient went to OR 11/04 and had guillotine amputation through the knee of the LLE. Patient had left AKA with Dr. Durbin on 11/12/2020.  Right LE improving, continue with viscopaste.        Goals: Steady decrease in wound area and depth weekly.    NURSING PLAN OF CARE ORDERS (X):    Dressing changes: See Dressing Care orders: X  Skin care: See Skin Care orders: X  RN Prevention Protocol:   Rectal tube care: See Rectal Tube Care orders:   Other orders:    WOUND TEAM PLAN OF CARE:   Dressing changes by wound team:                   Follow up 3 times weekly:          NPWT change 3 times weekly:    Follow up 1-2 times weekly:    X- RLE  Follow up Bi-Monthly:                   Follow up as needed:       Other (explain):     Anticipated discharge plans: Patient wants to transfer to [facility].   LTACH:        SNF/Rehab: x                 Home Health Care:           Outpatient Wound Center:            Self Care:

## 2020-11-16 NOTE — DISCHARGE SUMMARY
Discharge Summary    CHIEF COMPLAINT ON ADMISSION  Chief Complaint   Patient presents with   • Wound Check     Pt has bilateral lower leg wounds, pt stated pain has gotten worse in the past few days. BLE wrapped, visible wounds and swelling, pt taking antibiotics        Reason for Admission  Toe Pain     Admission Date  10/29/2020    CODE STATUS  Full Code    HPI & HOSPITAL COURSE  Patient is a 54 year old male with a history of  DM, a. fib (on  Chronic anticoagulation), CAD (s/p CABG), SHASHANK, previous nicotine use and COPD who presented with a chief complaint of worsening bilateral lower extremity swelling,and pain. His symptoms had been worseing for several weeks prior to admission.  He was admitted for treatment of suspected necrotizing cellulitis and on 11/1/20 he was transferred to the ICU for sepsis with hypotension, hyponatremia, acute renal failure, acute systolic heart failure and hypoxia.  He was found to have group A strep bacteremia with severe PAD. He was followed by cardiology and will require outpatient follow up with them.      On 11/4/20 he underwent a left sided guillotine through the knee amputation with Dr. Durbin. Post operatively he had significant bleeding from his wound site and he did require some perioperative transfusions.  This has improved but not yet resolved.  Due to this his chronic coumadin (which he takes for afib) has been held. His chronic lasix was also held, in anticipation of hemodynamic stability and will require close monitoring and titration back on as tolerated.    He also has stage one decubitus ulcers which wound care has been following and continue to require frequent turning and care.  A hemovac remains in place at the left AKA site.  Surgery was notified that patient is planning on leaving [place] and they recommended that his hemovac be evaluated asap and removed when stable by another provider.      He completed a course of antibiotics and ID recommended ongoing wound  "care. Re evaluation by limb preservation service is pending.  He was also followed by vascular surgery and they recommended outpatient follow up.  He was a 2-3 person assist and refused PT evaluations the day of discharge and was requiring significant assistance.  He was also noted to have subacute perioperative hypoxia, consistent with atelectasis and we were in the processing of weaning this down.  A hemovac remains in place.  Surgery was notified that patient is planning on leaving AMA and they recommended that his hemovac be evaluated asap and removed when stable by another provider.  Twice over the past two days he and I have discussed his wish to leave AMA and he did express understanding that it could result in his death but he and his wife do not appear to appreciate the gravity of the risk.  His wife reports her father was an amputee and so she knows how to transport and transfer him but I expressed concern that he has not even attempted transfers with PT and is requiring significant assistance just getting to the edge of the bed or turning.  I expressed great concern that he will not be able to safely transferred in and out of their private care in addition to there being no confirmed receiving facility.  He and his wife both responded \"I don't want to hear it, we're going\".  I also expressed concern over ongoing bleeding and oxygen requirements.  His wife stated that she has oxygen he can use and that they can manage everything.  Patient, his wife and son have all been educated that patient is at significant risk for a very poor outcome including death if he leaves AMA at this time.    Nursing supervisors and case management have been assisting attempts to facilitate a safe discharge.  Risk management and hospitalist leadership have also been asked for assistance to help insure that the safest plan possible is in place.    Therefore, he is discharged in guarded condition against medcial advice.    The " patient met 2-midnight criteria for an inpatient stay at the time of discharge.    Discharge Date  11/16/20    FOLLOW UP ITEMS POST DISCHARGE  Encouraged to seek further medical care ASAP    DISCHARGE DIAGNOSES  Principal Problem:    Cellulitis POA: Unknown  Active Problems:    Right-sided heart failure (HCC) POA: Unknown    Paroxysmal atrial fibrillation ([facility]) POA: Yes    Hyponatremia POA: Unknown    COPD (chronic obstructive pulmonary disease) ([facility]) POA: Unknown    Type 2 diabetes mellitus with complication, without long-term current use of insulin ([facility]) POA: Yes    Heart failure with preserved ejection fraction, borderline, class IV ([facility]) POA: Yes    Cigarette nicotine dependence with nicotine-induced disorder POA: Yes    Elevated troponin POA: Yes  Resolved Problems:    Sepsis ([facility]) POA: Unknown    LINSEY (acute kidney injury) ([facility]) POA: Unknown    Supratherapeutic INR POA: Yes    High anion gap metabolic acidosis POA: Unknown    Elevated LFTs POA: Unknown    Morbid obesity with BMI of 40.0-44.9, adult ([facility]) POA: Yes    Polycythemia POA: Unknown      FOLLOW UP  No future appointments.  No follow-up provider specified.    MEDICATIONS ON DISCHARGE     Medication List      CONTINUE taking these medications      Instructions   acetaminophen 325 MG Tabs  Commonly known as: Tylenol   Take 2 Tabs by mouth every 6 hours as needed (Mild Pain; (Pain scale 1-3); Temp greater than 100.5 F).  Dose: 650 mg     albuterol 108 (90 Base) MCG/ACT Aers inhalation aerosol   Inhale 2 Puffs by mouth every 6 hours as needed for Shortness of Breath.  Dose: 2 Puff     amiodarone 200 MG Tabs  Commonly known as: Cordarone   Take 1 Tab by mouth every morning.  Dose: 200 mg     ferrous sulfate 325 (65 Fe) MG tablet   Take 325 mg by mouth every morning.  Dose: 325 mg     ipratropium-albuterol 0.5-2.5 (3) MG/3ML nebulizer solution  Commonly known as: DUONEB   3 mL by Nebulization route every 6 hours as needed for Shortness of Breath.  Dose: 3  mL     lisinopril 2.5 MG Tabs  Commonly known as: PRINIVIL   Take 1 Tab by mouth every morning.  Dose: 2.5 mg     metoprolol SR 25 MG Tb24  Commonly known as: TOPROL XL   Take 1 Tab by mouth every morning.  Dose: 25 mg     polyethylene glycol/lytes 17 g Pack  Commonly known as: MIRALAX   Take 17 g by mouth every morning.  Dose: 17 g     rosuvastatin 40 MG tablet  Commonly known as: CRESTOR   Take 1 Tab by mouth every morning.  Dose: 40 mg     spironolactone 25 MG Tabs  Commonly known as: ALDACTONE   Take 1 Tab by mouth every morning.  Dose: 25 mg     tiotropium 18 MCG Caps  Commonly known as: SPIRIVA   Inhale 18 mcg by mouth every morning.  Dose: 18 mcg        STOP taking these medications    furosemide 40 MG Tabs  Commonly known as: LASIX     potassium chloride SA 10 MEQ Tbcr  Commonly known as: K-DUR     warfarin 5 MG Tabs  Commonly known as: COUMADIN            Allergies  Allergies   Allergen Reactions   • Cillins [Penicillins] Anaphylaxis and Rash     As a child; tolerated cefepime (12/2017), ceftriaxone (1/2018), cefazolin (12/2017)   • Erythromycin Anaphylaxis     Rxn - 1994   • Shellfish Allergy Anaphylaxis     Pt stated that he is allergic to all seafood, will develop a rash and says that rash will clear up with benadryl   • Metoprolol Unspecified     Pt stated got latham and aggressive, in demi dose's per wife.          DIET  Orders Placed This Encounter   Procedures   • Diet Order Diet: Consistent CHO (Diabetic)     Standing Status:   Standing     Number of Occurrences:   1     Order Specific Question:   Diet:     Answer:   Consistent CHO (Diabetic) [4]       ACTIVITY    CONSULTATIONS  Limb preservation services  Ortho  Vascular surgery  Critical care  Cardiology    PROCEDURES  EC-ECHOCARDIOGRAM LTD W/ CONT   Final Result      DX-CHEST-LIMITED (1 VIEW)   Final Result      1.  Right internal jugular catheter is been placed and appears appropriately located      2.  Moderate pulmonary edema      3.  Enlarged  cardiac silhouette      CT-EXTREMITY, LOWER W/O RIGHT   Final Result      1.  No discrete fluid collection is identified to suggest abscess.      2.  Diffuse subcutaneous edema and overlying skin thickening with venous varicosities.      3.  Atherosclerosis.      4.  Diffuse muscle atrophy.      CT-EXTREMITY, LOWER W/O LEFT   Final Result      1.  Left leg cellulitis without abscess or soft tissue gas      2.  Small vessel atherosclerotic plaque      3.  osteoarthritis of the left knee and left midfoot      4.  Muscular atrophy      EC-ECHOCARDIOGRAM COMPLETE W/O CONT   Final Result      DX-CHEST-PORTABLE (1 VIEW)   Final Result         1.  Pulmonary edema and/or infiltrates are identified, which are stable since the prior exam.   2.  Cardiomegaly      US-EXTREMITY ARTERY LOWER BILAT   Final Result      US-EXTREMITY VENOUS LOWER BILAT   Final Result      DX-TIBIA AND FIBULA RIGHT   Final Result         1.  No acute traumatic bony injury.   2.  Tricompartmental degenerative changes of the knee   3.  Atherosclerosis      DX-TIBIA AND FIBULA LEFT   Final Result   Addendum 1 of 1   Addendum: Subtle clustered punctate lucencies lateral to the ankle are    seen which could represent tiny foci of soft tissue gas.      These findings were discussed with the patient's clinician, ELIZABETH HILARIO,    on 10/30/2020 12:34 AM.      Final      DX-CHEST-PORTABLE (1 VIEW)   Final Result         1.  Interstitial pulmonary parenchymal prominence, compatible with interstitial edema and/or infiltrates.   2.  Cardiomegaly            LABORATORY  Lab Results   Component Value Date    SODIUM 134 (L) 11/16/2020    POTASSIUM 4.5 11/16/2020    CHLORIDE 99 11/16/2020    CO2 29 11/16/2020    GLUCOSE 132 (H) 11/16/2020    BUN 18 11/16/2020    CREATININE 0.69 11/16/2020        Lab Results   Component Value Date    WBC 10.4 11/16/2020    HEMOGLOBIN 8.2 (L) 11/16/2020    HEMATOCRIT 27.3 (L) 11/16/2020    PLATELETCT 288 11/16/2020        Total time of  the discharge process exceeds 75 minutes.

## 2020-11-16 NOTE — DISCHARGE PLANNING
"Met with pt, wife, son and service dog all present at bedside. Pt is demanding to be transferred to [facility] today. States he \"spoke to them last week  and has been accepted pending paperwork.\" Referral placed to to [facility]. Called [facility], left message with Aguilar in admissions  requesting return call asa. Spoke to Kate at [facility], she said she \"doesn't know anything about this admit and Aguilar is the one to speak to.\" Pt has not been medically cleared for transfer. Traci RN is aware and she will notify unit manager. Dr. Marsh requested that Risk Management be notified. Left message with [place] X [phone].   "

## 2020-11-17 NOTE — PROGRESS NOTES
Patient's family (son and wife) arrived at bedside with personal wheelchair. Patient is adamant that he is leaving today despite not being medically cleared. This RN called hospitalist MD Marsh to update. This RN paged ortho TRENTON Ramirez who was unavailable. This RN then paged surgeon MD Durbin. MD Durbin returned phone call and said that patient can leave with the Hemovac drain in, but family must record daily output and leave it in until it drains less than 30 mL in 24 hours. MD Durbin also stated that patient must follow up with healthcare provider in 2 weeks. This RN printed [facility] paperwork for Hemovac instructions and Incisional Care instructions. This RN notified charge RN and supervisor Mckenzie. Supervisor Yumiko arrived at bedside to discuss risks of leaving AMA. Patient continued to be adamant about leaving today.

## 2020-11-17 NOTE — DOCUMENTATION QUERY
[facility]                                                                       Query Response Note      PATIENT:               JARRETT WHITE  ACCT #:                  [id number]  MRN:                     [id number]  :                      1965  ADMIT DATE:       10/29/2020 9:33 PM  DISCH DATE:        2020 4:50 PM  RESPONDING  PROVIDER #:        [id number]           QUERY TEXT:    There appears to be conflicting wound documentation.  1.  Stage one decubitus ulcers are documented in the Discharge Summary.   2.  The Wound Care RN notes report a Full Thickness Wound Leg;Foot Circumferential Right POA (Active) 10/30/20   Please specify if you:    NOTE:  If an appropriate response is not listed below, please respond with a new note.    The patient's Clinical Indicators include:  Per Wound Team Notes   -10/30/20 Full Thickness Wound Leg;Foot Circumferential Right POA (Active)     Per D/C Summary   - also has stage one decubitus ulcers which wound care has been following     Treatment:   Wound Team Consultation, dressings with Viscopaste/ABD/Kerlix 3 times a week, & reposition q2 hrs    Risk Factors:   Sepsis, severe cellulitis, acute kidney injury, paroxysmal atrial fibrillation, acute HFpEF, COPD, DM2, & nicotine dependence  Options provided:   -- Agree with Wound Care RN assessment   -- Disagree with Wound Care RN assessment, (Please document location of stage one decubitus ulcers)   -- Unable to determine      Query created by: Kinjal Goodwin on 2020 7:41 AM    RESPONSE TEXT:    Agree with Wound Care RN assessment       QUERY TEXT:    Per the Progress Notes there appears to be conflicting information regarding the type of Congestive Heart Failure. Can this condition be clarified?     NOTE:  If an appropriate response is not listed below, please respond with a new note.    The patient's Clinical Indicators include:  Per Progress  Note 11/16  -he was transferred to the ICU for sepsis with hypotension, hyponatremia, acute renal failure, acute systolic heart failure and hypoxia  -Repeat ECHO - EF 50%, RVSP 40, mild AS   -Heart failure with preserved ejection fraction, borderline, class IV (HCC)- (present on admission)   -Acute due to sepsis    Per Progress Note 10/31  -Cardiology consulted, suggest probable right sided failure   -History of HFrEF (EF 25%) -.  HFpEF (EF 55%) s/p CABG times 3 December 2017     Per Cardiology Progress Note 11/2  -2. Acute decompensated diastolic heart failure     Treatment:   Cardiology Consultation, CXR, echocardiogram, NT-proBNP, IV Lasix, amiodarone, metoprolol, I&O, & daily weight     Risk Factors:   CAD, COPD, obesity, DM2, paroxysmal atrial fibrillation, PAD, acute kidney injury, metabolic acidosis, sepsis, & severe cellulitis  Options provided:   -- Acute Systolic heart failure   -- Acute on Chronic Systolic heart failure   -- Acute Diastolic heart failure   -- Acute on Chronic Diastolic heart failure   -- Acute Systolic and Diastolic heart failure   -- Acute on Chronic Systolic and Diastolic heart failure   -- Unable to determine      Query created by: Kinjal Goodwin on 11/17/2020 8:06 AM    RESPONSE TEXT:    Acute Systolic heart failure          Electronically signed by:  BREANNA CROFT MD 11/17/2020 2:55 PM

## 2020-11-17 NOTE — PROGRESS NOTES
"   Orthopaedic Progress Note    Interval changes:  Patient doing well POD#4 S/P L AKA, HV still with generous output - left in  Family willing to provide home drain care  OK for Dispo from Ortho standpoint when cleared by other services    ROS - Patient denies any new issues, except per above.       /58   Pulse 80   Temp 36.3 °C (97.3 °F) (Temporal)   Resp 18   Ht 1.956 m (6' 5\")   Wt (!) 156.4 kg (344 lb 12.8 oz)   SpO2 96%     Patient seen and examined  No acute distress  Breathing non labored  RRR  Proximal compartments soft drain patent  Dressing CDI    Recent Labs     11/14/20  0524 11/15/20  0327 11/16/20  0438   WBC 11.4* 10.7 10.4   RBC 2.66* 2.66* 2.72*   HEMOGLOBIN 8.2* 8.0* 8.2*   HEMATOCRIT 26.3* 26.3* 27.3*   MCV 98.9* 98.9* 100.4*   MCH 30.8 30.1 30.1   MCHC 31.2* 30.4* 30.0*   RDW 64.2* 65.6* 67.7*   PLATELETCT 248 248 288   MPV 10.2 10.1 10.4     Recent Labs     11/14/20  0524 11/15/20  0327 11/16/20  0438   SODIUM 130* 128* 134*   POTASSIUM 4.0 4.5 4.5   CHLORIDE 95* 95* 99   CO2 29 30 29   GLUCOSE 204* 166* 132*   BUN 21 20 18     Recent Labs     11/14/20  0524 11/15/20  0327 11/16/20  0438   ALBUMIN  --   --  2.5*   TBILIRUBIN  --   --  0.3   ALKPHOSPHAT  --   --  104*   TOTPROTEIN  --   --  6.7   ALTSGPT  --   --  22   ASTSGOT  --   --  47*   CREATININE 0.73 0.76 0.69       Active Hospital Problems    Diagnosis   • Right-sided heart failure (HCC) [I50.810]     Priority: High   • Cellulitis [L03.90]     Priority: High   • Paroxysmal atrial fibrillation (HCC) [I48.0]     Priority: Medium   • Elevated troponin [R77.8]     Priority: Low   • Cigarette nicotine dependence with nicotine-induced disorder [F17.219]     Priority: Low   • Heart failure with preserved ejection fraction, borderline, class IV ([facility]) [I50.30]     Priority: Low   • Type 2 diabetes mellitus with complication, without long-term current use of insulin ([facility]) [E11.8]     Priority: Low   • COPD (chronic obstructive " pulmonary disease) ([facility]) [J44.9]     Priority: Low   • Hyponatremia [E87.1]     Priority: Low       Assessment/Plan:  Patient doing well  POD#4 S/P L AKA  POD#12 S/P Left guillotine through-knee amputation  Wt bearing status - NWB LLE  Wound care/Drains - dressing changed by RN/LPS team. HV 80 mL last 12h, left in  Future Procedures - none anticipated  Sutures/Staples out- 14-21 day post definitive amputation  PT/OT-initiated  Antibiotics: ancef 2g IV q8  DVT Prophylaxis- TEDS/SCDs/Foot pumps, UFH 5Ku q8  Ceballos-none  Case Coordination for Discharge Planning - Disposition rehab likely, pending Tx reccs.  Recommend LMWH rather than UFH, ASA on DC/Xfr  Labs in AM  Sutures out 2-3 weeks postop with Dr. Durbin in Clinic  LPS to return Monday to see pt.  Nursing issues d/w RN at bedside

## 2020-11-17 NOTE — CONSULTS
Diabetes education: Pt has a hx of diabetes per APN not but states he does not have diabetes. Pt is post L BKA and then L AKA ( done on 11/12/20).   Pt is currently on regular insulin sliding scale coverage ac and hs with blood sugars of 187 ( 1 unit), 190 ( 1 unit) and 117. Hg a1c of 7.8% from  10/30/20.  At time of visit and before pt left AMA, pt was insisting on leaving and going to the rehab facility in [place] ( CDE not aware is left AMA).  Pt listened a little regarding need for insulin and what effects blood sugars, but he and his children ( also present) were anxious to leave as they had a long drive a head of them. Per AVS pt did not go home/leave with insulin.

## 2020-11-24 ENCOUNTER — HOSPITAL ENCOUNTER (INPATIENT)
Facility: MEDICAL CENTER | Age: 55
LOS: 14 days | DRG: 565 | End: 2020-12-08
Attending: STUDENT IN AN ORGANIZED HEALTH CARE EDUCATION/TRAINING PROGRAM | Admitting: STUDENT IN AN ORGANIZED HEALTH CARE EDUCATION/TRAINING PROGRAM
Payer: MEDICARE

## 2020-11-24 DIAGNOSIS — L08.9 SOFT TISSUE INFECTION: Primary | ICD-10-CM

## 2020-11-24 DIAGNOSIS — K86.9 PANCREATIC LESION: ICD-10-CM

## 2020-11-24 PROCEDURE — 85610 PROTHROMBIN TIME: CPT

## 2020-11-24 PROCEDURE — 84134 ASSAY OF PREALBUMIN: CPT

## 2020-11-24 PROCEDURE — C9803 HOPD COVID-19 SPEC COLLECT: HCPCS | Performed by: STUDENT IN AN ORGANIZED HEALTH CARE EDUCATION/TRAINING PROGRAM

## 2020-11-24 PROCEDURE — 770020 HCHG ROOM/CARE - TELE (206)

## 2020-11-24 PROCEDURE — 36415 COLL VENOUS BLD VENIPUNCTURE: CPT

## 2020-11-24 RX ORDER — BISACODYL 10 MG
10 SUPPOSITORY, RECTAL RECTAL
Status: DISCONTINUED | OUTPATIENT
Start: 2020-11-24 | End: 2020-12-08 | Stop reason: HOSPADM

## 2020-11-24 RX ORDER — SPIRONOLACTONE 25 MG/1
25 TABLET ORAL EVERY MORNING
Status: DISCONTINUED | OUTPATIENT
Start: 2020-11-25 | End: 2020-12-08 | Stop reason: HOSPADM

## 2020-11-24 RX ORDER — ROSUVASTATIN CALCIUM 20 MG/1
40 TABLET, COATED ORAL EVERY MORNING
Status: DISCONTINUED | OUTPATIENT
Start: 2020-11-25 | End: 2020-12-08 | Stop reason: HOSPADM

## 2020-11-24 RX ORDER — AMOXICILLIN 250 MG
2 CAPSULE ORAL 2 TIMES DAILY
Status: DISCONTINUED | OUTPATIENT
Start: 2020-11-24 | End: 2020-12-08 | Stop reason: HOSPADM

## 2020-11-24 RX ORDER — WARFARIN SODIUM 7.5 MG/1
7.5 TABLET ORAL DAILY
Status: DISCONTINUED | OUTPATIENT
Start: 2020-11-25 | End: 2020-11-24

## 2020-11-24 RX ORDER — POLYETHYLENE GLYCOL 3350 17 G/17G
1 POWDER, FOR SOLUTION ORAL
Status: DISCONTINUED | OUTPATIENT
Start: 2020-11-24 | End: 2020-12-08 | Stop reason: HOSPADM

## 2020-11-24 RX ORDER — FUROSEMIDE 40 MG/1
40 TABLET ORAL DAILY
Status: DISCONTINUED | OUTPATIENT
Start: 2020-11-25 | End: 2020-12-08 | Stop reason: HOSPADM

## 2020-11-24 RX ORDER — METOPROLOL SUCCINATE 25 MG/1
25 TABLET, EXTENDED RELEASE ORAL EVERY MORNING
Status: DISCONTINUED | OUTPATIENT
Start: 2020-11-25 | End: 2020-12-08 | Stop reason: HOSPADM

## 2020-11-24 RX ORDER — WARFARIN SODIUM 7.5 MG/1
7.5 TABLET ORAL DAILY
Status: ON HOLD | COMMUNITY
End: 2020-12-08

## 2020-11-24 RX ORDER — IPRATROPIUM BROMIDE AND ALBUTEROL SULFATE 2.5; .5 MG/3ML; MG/3ML
3 SOLUTION RESPIRATORY (INHALATION)
Status: DISCONTINUED | OUTPATIENT
Start: 2020-11-24 | End: 2020-11-25

## 2020-11-24 RX ORDER — FUROSEMIDE 40 MG/1
40 TABLET ORAL DAILY
Status: ON HOLD | COMMUNITY
End: 2020-12-08

## 2020-11-24 RX ORDER — FERROUS SULFATE 325(65) MG
325 TABLET ORAL EVERY MORNING
Status: DISCONTINUED | OUTPATIENT
Start: 2020-11-25 | End: 2020-12-08 | Stop reason: HOSPADM

## 2020-11-24 RX ORDER — AMIODARONE HYDROCHLORIDE 200 MG/1
200 TABLET ORAL EVERY MORNING
Status: DISCONTINUED | OUTPATIENT
Start: 2020-11-25 | End: 2020-12-08 | Stop reason: HOSPADM

## 2020-11-24 RX ORDER — POTASSIUM CHLORIDE 750 MG/1
10 TABLET, EXTENDED RELEASE ORAL DAILY
Status: ON HOLD | COMMUNITY
End: 2020-12-08

## 2020-11-24 SDOH — ECONOMIC STABILITY: FOOD INSECURITY: WITHIN THE PAST 12 MONTHS, THE FOOD YOU BOUGHT JUST DIDN'T LAST AND YOU DIDN'T HAVE MONEY TO GET MORE.: NEVER TRUE

## 2020-11-24 SDOH — HEALTH STABILITY: MENTAL HEALTH: HOW OFTEN DO YOU HAVE 6 OR MORE DRINKS ON ONE OCCASION?: NEVER

## 2020-11-24 SDOH — ECONOMIC STABILITY: FOOD INSECURITY: WITHIN THE PAST 12 MONTHS, YOU WORRIED THAT YOUR FOOD WOULD RUN OUT BEFORE YOU GOT MONEY TO BUY MORE.: NEVER TRUE

## 2020-11-24 SDOH — HEALTH STABILITY: MENTAL HEALTH: HOW OFTEN DO YOU HAVE A DRINK CONTAINING ALCOHOL?: NEVER

## 2020-11-24 SDOH — ECONOMIC STABILITY: TRANSPORTATION INSECURITY
IN THE PAST 12 MONTHS, HAS LACK OF TRANSPORTATION KEPT YOU FROM MEETINGS, WORK, OR FROM GETTING THINGS NEEDED FOR DAILY LIVING?: NO

## 2020-11-24 SDOH — ECONOMIC STABILITY: TRANSPORTATION INSECURITY
IN THE PAST 12 MONTHS, HAS THE LACK OF TRANSPORTATION KEPT YOU FROM MEDICAL APPOINTMENTS OR FROM GETTING MEDICATIONS?: NO

## 2020-11-24 ASSESSMENT — FIBROSIS 4 INDEX
FIB4 SCORE: 1.95
FIB4 SCORE: 1.95

## 2020-11-24 ASSESSMENT — PAIN DESCRIPTION - PAIN TYPE: TYPE: ACUTE PAIN

## 2020-11-24 NOTE — PROGRESS NOTES
[facility]  Transferring facility: [facility]  Transferring physician: Dr Zarate    Chief complaint: Wound Vac/post surgical complications  Pertinent history & patient course: Patient is a 54-year-old  male with past medical history of CHF, diabetes mellitus, COPD, atrial fibrillation on Coumadin who presents to [facility] for acute dyspnea on exertion.  Patient was found to have congestive heart failure exacerbation and was admitted at that time.  He had necrotizing fasciitis left lower extremity 3 weeks ago and had left AKA performed [facility].  He had another revision surgery 1 week ago and left AMA with wound VAC.  Transferring provider states that wound VAC is oozing pus and not functioning properly and request transfer to facility patient had surgical intervention performed.  Transferring provider had called Dr. Durbin and accepted patient to [facility].  Transferring provider states that wound cultures were performed today and pending and patient is being treated with vancomycin and Zosyn as well as Coumadin 12.5 mg daily for his atrial fibrillation.  He is a subtherapeutic INR 1.5.  Pertinent imaging & lab results: Subtherapeutic INR 1.5.  Code Status: Full code per transferring provider, I personally verified with the transferring provider patient's code status and the transferring provider has confirmed this with the patient.  Further work up or recommendations per triage officer prior to transfer: None   Consultants called prior to transfer and pertinent input from consultants: None  Patient accepted for transfer: Yes  Consultants to be called upon arrival: Dr. Durbin/Dr. Gr in am  Admission status: Inpatient.   Floor requested: Telemetry  If ICU transfer, name of intensivist case discussed with and pertinent input from critical care: N/A    Please inform the triage officer  upon arrival of the patient to [facility] for assignment of a hospitalist to perform admission.     For any question or concerns regarding the care of this patient, please reach out to the assigned hospitalist.    Julius Dia D.O.

## 2020-11-25 ENCOUNTER — APPOINTMENT (OUTPATIENT)
Dept: RADIOLOGY | Facility: MEDICAL CENTER | Age: 55
DRG: 565 | End: 2020-11-25
Attending: STUDENT IN AN ORGANIZED HEALTH CARE EDUCATION/TRAINING PROGRAM
Payer: MEDICARE

## 2020-11-25 ENCOUNTER — APPOINTMENT (OUTPATIENT)
Dept: RADIOLOGY | Facility: MEDICAL CENTER | Age: 55
DRG: 565 | End: 2020-11-25
Attending: ORTHOPAEDIC SURGERY
Payer: MEDICARE

## 2020-11-25 ENCOUNTER — APPOINTMENT (OUTPATIENT)
Dept: RADIOLOGY | Facility: MEDICAL CENTER | Age: 55
DRG: 565 | End: 2020-11-25
Attending: INTERNAL MEDICINE
Payer: MEDICARE

## 2020-11-25 PROBLEM — I27.20 PULMONARY HTN (HCC): Status: ACTIVE | Noted: 2020-11-25

## 2020-11-25 PROBLEM — L08.9 SOFT TISSUE INFECTION: Status: ACTIVE | Noted: 2020-11-25

## 2020-11-25 PROBLEM — L03.90 CELLULITIS: Status: RESOLVED | Noted: 2020-10-30 | Resolved: 2020-11-25

## 2020-11-25 LAB
ALBUMIN SERPL BCP-MCNC: 2.8 G/DL (ref 3.2–4.9)
ALBUMIN/GLOB SERPL: 0.6 G/DL
ALP SERPL-CCNC: 91 U/L (ref 30–99)
ALT SERPL-CCNC: 12 U/L (ref 2–50)
ANION GAP SERPL CALC-SCNC: 4 MMOL/L (ref 7–16)
ANISOCYTOSIS BLD QL SMEAR: ABNORMAL
APTT PPP: 43.1 SEC (ref 24.7–36)
AST SERPL-CCNC: 17 U/L (ref 12–45)
BASOPHILS # BLD AUTO: 0.5 % (ref 0–1.8)
BASOPHILS # BLD: 0.05 K/UL (ref 0–0.12)
BILIRUB SERPL-MCNC: 0.2 MG/DL (ref 0.1–1.5)
BUN SERPL-MCNC: 13 MG/DL (ref 8–22)
CALCIUM SERPL-MCNC: 8.7 MG/DL (ref 8.5–10.5)
CHLORIDE SERPL-SCNC: 100 MMOL/L (ref 96–112)
CO2 SERPL-SCNC: 30 MMOL/L (ref 20–33)
COMMENT 1642: NORMAL
COVID ORDER STATUS COVID19: NORMAL
CREAT SERPL-MCNC: 0.72 MG/DL (ref 0.5–1.4)
EKG IMPRESSION: NORMAL
EOSINOPHIL # BLD AUTO: 0.34 K/UL (ref 0–0.51)
EOSINOPHIL NFR BLD: 3.5 % (ref 0–6.9)
ERYTHROCYTE [DISTWIDTH] IN BLOOD BY AUTOMATED COUNT: 71 FL (ref 35.9–50)
FLUAV RNA SPEC QL NAA+PROBE: NEGATIVE
FLUBV RNA SPEC QL NAA+PROBE: NEGATIVE
GLOBULIN SER CALC-MCNC: 4.6 G/DL (ref 1.9–3.5)
GLUCOSE BLD-MCNC: 129 MG/DL (ref 65–99)
GLUCOSE BLD-MCNC: 148 MG/DL (ref 65–99)
GLUCOSE BLD-MCNC: 152 MG/DL (ref 65–99)
GLUCOSE SERPL-MCNC: 129 MG/DL (ref 65–99)
HCT VFR BLD AUTO: 31.3 % (ref 42–52)
HGB BLD-MCNC: 9 G/DL (ref 14–18)
IMM GRANULOCYTES # BLD AUTO: 0.07 K/UL (ref 0–0.11)
IMM GRANULOCYTES NFR BLD AUTO: 0.7 % (ref 0–0.9)
INR PPP: 1.84 (ref 0.87–1.13)
INR PPP: 1.97 (ref 0.87–1.13)
INR PPP: 2.01 (ref 0.87–1.13)
LACTATE BLD-SCNC: 0.9 MMOL/L (ref 0.5–2)
LACTATE BLD-SCNC: 0.9 MMOL/L (ref 0.5–2)
LACTATE BLD-SCNC: 1.3 MMOL/L (ref 0.5–2)
LACTATE BLD-SCNC: 1.3 MMOL/L (ref 0.5–2)
LYMPHOCYTES # BLD AUTO: 0.68 K/UL (ref 1–4.8)
LYMPHOCYTES NFR BLD: 7 % (ref 22–41)
MACROCYTES BLD QL SMEAR: ABNORMAL
MAGNESIUM SERPL-MCNC: 2 MG/DL (ref 1.5–2.5)
MCH RBC QN AUTO: 28.7 PG (ref 27–33)
MCHC RBC AUTO-ENTMCNC: 28.8 G/DL (ref 33.7–35.3)
MCV RBC AUTO: 99.7 FL (ref 81.4–97.8)
MONOCYTES # BLD AUTO: 0.48 K/UL (ref 0–0.85)
MONOCYTES NFR BLD AUTO: 4.9 % (ref 0–13.4)
MORPHOLOGY BLD-IMP: NORMAL
NEUTROPHILS # BLD AUTO: 8.09 K/UL (ref 1.82–7.42)
NEUTROPHILS NFR BLD: 83.4 % (ref 44–72)
NRBC # BLD AUTO: 0 K/UL
NRBC BLD-RTO: 0 /100 WBC
PLATELET # BLD AUTO: 270 K/UL (ref 164–446)
PLATELET BLD QL SMEAR: NORMAL
PMV BLD AUTO: 10 FL (ref 9–12.9)
POIKILOCYTOSIS BLD QL SMEAR: NORMAL
POLYCHROMASIA BLD QL SMEAR: NORMAL
POTASSIUM SERPL-SCNC: 4 MMOL/L (ref 3.6–5.5)
PREALB SERPL-MCNC: 12.4 MG/DL (ref 18–38)
PROT SERPL-MCNC: 7.4 G/DL (ref 6–8.2)
PROTHROMBIN TIME: 21.8 SEC (ref 12–14.6)
PROTHROMBIN TIME: 23.1 SEC (ref 12–14.6)
PROTHROMBIN TIME: 23.4 SEC (ref 12–14.6)
RBC # BLD AUTO: 3.14 M/UL (ref 4.7–6.1)
RBC BLD AUTO: PRESENT
RSV RNA SPEC QL NAA+PROBE: NEGATIVE
SARS-COV-2 RNA RESP QL NAA+PROBE: NOTDETECTED
SODIUM SERPL-SCNC: 134 MMOL/L (ref 135–145)
SPECIMEN SOURCE: NORMAL
STOMATOCYTES BLD QL SMEAR: NORMAL
UFH PPP CHRO-ACNC: <0.1 IU/ML
VANCOMYCIN SERPL-MCNC: 15.1 UG/ML
VANCOMYCIN TROUGH SERPL-MCNC: 12.7 UG/ML (ref 10–20)
WBC # BLD AUTO: 9.7 K/UL (ref 4.8–10.8)

## 2020-11-25 PROCEDURE — 700102 HCHG RX REV CODE 250 W/ 637 OVERRIDE(OP): Performed by: STUDENT IN AN ORGANIZED HEALTH CARE EDUCATION/TRAINING PROGRAM

## 2020-11-25 PROCEDURE — 700111 HCHG RX REV CODE 636 W/ 250 OVERRIDE (IP): Performed by: STUDENT IN AN ORGANIZED HEALTH CARE EDUCATION/TRAINING PROGRAM

## 2020-11-25 PROCEDURE — 85610 PROTHROMBIN TIME: CPT

## 2020-11-25 PROCEDURE — 85730 THROMBOPLASTIN TIME PARTIAL: CPT

## 2020-11-25 PROCEDURE — 93005 ELECTROCARDIOGRAM TRACING: CPT | Performed by: STUDENT IN AN ORGANIZED HEALTH CARE EDUCATION/TRAINING PROGRAM

## 2020-11-25 PROCEDURE — 36415 COLL VENOUS BLD VENIPUNCTURE: CPT

## 2020-11-25 PROCEDURE — 93926 LOWER EXTREMITY STUDY: CPT | Mod: 26,LT | Performed by: INTERNAL MEDICINE

## 2020-11-25 PROCEDURE — 87040 BLOOD CULTURE FOR BACTERIA: CPT

## 2020-11-25 PROCEDURE — 85520 HEPARIN ASSAY: CPT

## 2020-11-25 PROCEDURE — 700105 HCHG RX REV CODE 258: Performed by: STUDENT IN AN ORGANIZED HEALTH CARE EDUCATION/TRAINING PROGRAM

## 2020-11-25 PROCEDURE — 85025 COMPLETE CBC W/AUTO DIFF WBC: CPT

## 2020-11-25 PROCEDURE — A9270 NON-COVERED ITEM OR SERVICE: HCPCS | Performed by: STUDENT IN AN ORGANIZED HEALTH CARE EDUCATION/TRAINING PROGRAM

## 2020-11-25 PROCEDURE — 83735 ASSAY OF MAGNESIUM: CPT

## 2020-11-25 PROCEDURE — 99223 1ST HOSP IP/OBS HIGH 75: CPT | Performed by: INTERNAL MEDICINE

## 2020-11-25 PROCEDURE — 0241U HCHG SARS-COV-2 COVID-19 NFCT DS RESP RNA 4 TRGT MIC: CPT

## 2020-11-25 PROCEDURE — 80053 COMPREHEN METABOLIC PANEL: CPT

## 2020-11-25 PROCEDURE — 73701 CT LOWER EXTREMITY W/DYE: CPT | Mod: LT

## 2020-11-25 PROCEDURE — 700101 HCHG RX REV CODE 250: Performed by: STUDENT IN AN ORGANIZED HEALTH CARE EDUCATION/TRAINING PROGRAM

## 2020-11-25 PROCEDURE — 94640 AIRWAY INHALATION TREATMENT: CPT

## 2020-11-25 PROCEDURE — 770004 HCHG ROOM/CARE - ONCOLOGY PRIVATE *

## 2020-11-25 PROCEDURE — 94669 MECHANICAL CHEST WALL OSCILL: CPT

## 2020-11-25 PROCEDURE — 93010 ELECTROCARDIOGRAM REPORT: CPT | Performed by: INTERNAL MEDICINE

## 2020-11-25 PROCEDURE — 700117 HCHG RX CONTRAST REV CODE 255: Performed by: INTERNAL MEDICINE

## 2020-11-25 PROCEDURE — 74170 CT ABD WO CNTRST FLWD CNTRST: CPT

## 2020-11-25 PROCEDURE — 71045 X-RAY EXAM CHEST 1 VIEW: CPT

## 2020-11-25 PROCEDURE — 99223 1ST HOSP IP/OBS HIGH 75: CPT | Mod: AI,GC | Performed by: STUDENT IN AN ORGANIZED HEALTH CARE EDUCATION/TRAINING PROGRAM

## 2020-11-25 PROCEDURE — 93970 EXTREMITY STUDY: CPT

## 2020-11-25 PROCEDURE — 93970 EXTREMITY STUDY: CPT | Mod: 26 | Performed by: INTERNAL MEDICINE

## 2020-11-25 PROCEDURE — 93926 LOWER EXTREMITY STUDY: CPT | Mod: LT

## 2020-11-25 PROCEDURE — 83605 ASSAY OF LACTIC ACID: CPT

## 2020-11-25 PROCEDURE — 80202 ASSAY OF VANCOMYCIN: CPT | Mod: 91

## 2020-11-25 PROCEDURE — 82962 GLUCOSE BLOOD TEST: CPT

## 2020-11-25 RX ORDER — ACETAMINOPHEN 500 MG
500 TABLET ORAL EVERY 6 HOURS PRN
Status: DISCONTINUED | OUTPATIENT
Start: 2020-11-25 | End: 2020-11-25

## 2020-11-25 RX ORDER — OXYCODONE HYDROCHLORIDE 5 MG/1
5 TABLET ORAL EVERY 4 HOURS PRN
Status: DISCONTINUED | OUTPATIENT
Start: 2020-11-25 | End: 2020-12-08 | Stop reason: HOSPADM

## 2020-11-25 RX ORDER — MORPHINE SULFATE 4 MG/ML
1 INJECTION, SOLUTION INTRAMUSCULAR; INTRAVENOUS
Status: COMPLETED | OUTPATIENT
Start: 2020-11-25 | End: 2020-11-25

## 2020-11-25 RX ORDER — DEXTROSE MONOHYDRATE 25 G/50ML
50 INJECTION, SOLUTION INTRAVENOUS
Status: DISCONTINUED | OUTPATIENT
Start: 2020-11-25 | End: 2020-12-01

## 2020-11-25 RX ORDER — OXYCODONE HYDROCHLORIDE 5 MG/1
5 TABLET ORAL EVERY 4 HOURS PRN
Status: DISCONTINUED | OUTPATIENT
Start: 2020-11-25 | End: 2020-11-25

## 2020-11-25 RX ORDER — ACETAMINOPHEN 500 MG
500 TABLET ORAL EVERY 6 HOURS PRN
Status: DISCONTINUED | OUTPATIENT
Start: 2020-11-25 | End: 2020-12-08 | Stop reason: HOSPADM

## 2020-11-25 RX ORDER — IPRATROPIUM BROMIDE AND ALBUTEROL SULFATE 2.5; .5 MG/3ML; MG/3ML
3 SOLUTION RESPIRATORY (INHALATION)
Status: DISCONTINUED | OUTPATIENT
Start: 2020-11-26 | End: 2020-11-29

## 2020-11-25 RX ORDER — HEPARIN SODIUM 5000 [USP'U]/100ML
0-30 INJECTION, SOLUTION INTRAVENOUS CONTINUOUS
Status: DISCONTINUED | OUTPATIENT
Start: 2020-11-25 | End: 2020-11-28 | Stop reason: ALTCHOICE

## 2020-11-25 RX ORDER — HEPARIN SODIUM 5000 [USP'U]/ML
5000 INJECTION, SOLUTION INTRAVENOUS; SUBCUTANEOUS EVERY 8 HOURS
Status: DISCONTINUED | OUTPATIENT
Start: 2020-11-25 | End: 2020-11-25

## 2020-11-25 RX ORDER — OXYCODONE HYDROCHLORIDE 10 MG/1
10 TABLET ORAL EVERY 4 HOURS PRN
Status: DISCONTINUED | OUTPATIENT
Start: 2020-11-25 | End: 2020-12-08 | Stop reason: HOSPADM

## 2020-11-25 RX ORDER — LINEZOLID 600 MG/1
600 TABLET, FILM COATED ORAL EVERY 12 HOURS
Status: DISCONTINUED | OUTPATIENT
Start: 2020-11-25 | End: 2020-11-25

## 2020-11-25 RX ORDER — CEFAZOLIN SODIUM 2 G/100ML
2 INJECTION, SOLUTION INTRAVENOUS EVERY 8 HOURS
Status: DISCONTINUED | OUTPATIENT
Start: 2020-11-25 | End: 2020-12-08 | Stop reason: HOSPADM

## 2020-11-25 RX ORDER — BUDESONIDE AND FORMOTEROL FUMARATE DIHYDRATE 80; 4.5 UG/1; UG/1
2 AEROSOL RESPIRATORY (INHALATION)
Status: DISCONTINUED | OUTPATIENT
Start: 2020-11-25 | End: 2020-11-27

## 2020-11-25 RX ADMIN — CEFAZOLIN SODIUM 2 G: 2 INJECTION, SOLUTION INTRAVENOUS at 22:21

## 2020-11-25 RX ADMIN — BUDESONIDE AND FORMOTEROL FUMARATE DIHYDRATE 2 PUFF: 80; 4.5 AEROSOL RESPIRATORY (INHALATION) at 09:12

## 2020-11-25 RX ADMIN — IPRATROPIUM BROMIDE AND ALBUTEROL SULFATE 3 ML: 2.5; .5 SOLUTION RESPIRATORY (INHALATION) at 20:23

## 2020-11-25 RX ADMIN — IOHEXOL 100 ML: 350 INJECTION, SOLUTION INTRAVENOUS at 22:08

## 2020-11-25 RX ADMIN — HEPARIN SODIUM 22 UNITS/KG/HR: 5000 INJECTION, SOLUTION INTRAVENOUS at 11:38

## 2020-11-25 RX ADMIN — VANCOMYCIN HYDROCHLORIDE 1000 MG: 500 INJECTION, POWDER, LYOPHILIZED, FOR SOLUTION INTRAVENOUS at 08:28

## 2020-11-25 RX ADMIN — HEPARIN SODIUM 22 UNITS/KG/HR: 5000 INJECTION, SOLUTION INTRAVENOUS at 15:56

## 2020-11-25 RX ADMIN — IPRATROPIUM BROMIDE AND ALBUTEROL SULFATE 3 ML: 2.5; .5 SOLUTION RESPIRATORY (INHALATION) at 00:16

## 2020-11-25 RX ADMIN — OXYCODONE HYDROCHLORIDE 10 MG: 10 TABLET ORAL at 09:11

## 2020-11-25 RX ADMIN — MORPHINE SULFATE 1 MG: 4 INJECTION INTRAVENOUS at 21:05

## 2020-11-25 RX ADMIN — IPRATROPIUM BROMIDE AND ALBUTEROL SULFATE 3 ML: 2.5; .5 SOLUTION RESPIRATORY (INHALATION) at 10:41

## 2020-11-25 RX ADMIN — IPRATROPIUM BROMIDE AND ALBUTEROL SULFATE 3 ML: 2.5; .5 SOLUTION RESPIRATORY (INHALATION) at 15:00

## 2020-11-25 RX ADMIN — DOCUSATE SODIUM 50 MG AND SENNOSIDES 8.6 MG 2 TABLET: 8.6; 5 TABLET, FILM COATED ORAL at 17:48

## 2020-11-25 RX ADMIN — OXYCODONE HYDROCHLORIDE 5 MG: 5 TABLET ORAL at 06:08

## 2020-11-25 RX ADMIN — IPRATROPIUM BROMIDE AND ALBUTEROL SULFATE 3 ML: 2.5; .5 SOLUTION RESPIRATORY (INHALATION) at 23:56

## 2020-11-25 RX ADMIN — IPRATROPIUM BROMIDE AND ALBUTEROL SULFATE 3 ML: 2.5; .5 SOLUTION RESPIRATORY (INHALATION) at 06:35

## 2020-11-25 RX ADMIN — PIPERACILLIN AND TAZOBACTAM 4.5 G: 4; .5 INJECTION, POWDER, LYOPHILIZED, FOR SOLUTION INTRAVENOUS; PARENTERAL at 03:22

## 2020-11-25 RX ADMIN — OXYCODONE HYDROCHLORIDE 10 MG: 10 TABLET ORAL at 16:50

## 2020-11-25 RX ADMIN — IPRATROPIUM BROMIDE AND ALBUTEROL SULFATE 3 ML: 2.5; .5 SOLUTION RESPIRATORY (INHALATION) at 02:33

## 2020-11-25 RX ADMIN — FUROSEMIDE 40 MG: 40 TABLET ORAL at 05:58

## 2020-11-25 RX ADMIN — PIPERACILLIN AND TAZOBACTAM 4.5 G: 4; .5 INJECTION, POWDER, LYOPHILIZED, FOR SOLUTION INTRAVENOUS; PARENTERAL at 05:59

## 2020-11-25 RX ADMIN — HEPARIN SODIUM 18 UNITS/KG/HR: 5000 INJECTION, SOLUTION INTRAVENOUS at 04:14

## 2020-11-25 RX ADMIN — FERROUS SULFATE TAB 325 MG (65 MG ELEMENTAL FE) 325 MG: 325 (65 FE) TAB at 05:58

## 2020-11-25 RX ADMIN — SPIRONOLACTONE 25 MG: 25 TABLET ORAL at 05:58

## 2020-11-25 RX ADMIN — ROSUVASTATIN CALCIUM 40 MG: 20 TABLET, FILM COATED ORAL at 05:58

## 2020-11-25 RX ADMIN — AMIODARONE HYDROCHLORIDE 200 MG: 200 TABLET ORAL at 05:58

## 2020-11-25 ASSESSMENT — ENCOUNTER SYMPTOMS
BLURRED VISION: 0
CHILLS: 0
TINGLING: 0
SINUS PAIN: 0
VOMITING: 0
DIZZINESS: 1
FLANK PAIN: 0
DOUBLE VISION: 0
DIARRHEA: 0
HALLUCINATIONS: 0
PHOTOPHOBIA: 0
SPUTUM PRODUCTION: 1
FEVER: 0
WHEEZING: 1
MYALGIAS: 0
SHORTNESS OF BREATH: 1
WEAKNESS: 0
ABDOMINAL PAIN: 1
FALLS: 0
HEADACHES: 0
PALPITATIONS: 0
CONSTIPATION: 0
NAUSEA: 0
COUGH: 1
HEMOPTYSIS: 0

## 2020-11-25 ASSESSMENT — COPD QUESTIONNAIRES
HAVE YOU SMOKED AT LEAST 100 CIGARETTES IN YOUR ENTIRE LIFE: YES
DO YOU EVER COUGH UP ANY MUCUS OR PHLEGM?: YES, A FEW DAYS A WEEK OR MONTH
COPD SCREENING SCORE: 5
DURING THE PAST 4 WEEKS HOW MUCH DID YOU FEEL SHORT OF BREATH: NONE/LITTLE OF THE TIME

## 2020-11-25 ASSESSMENT — LIFESTYLE VARIABLES
EVER_SMOKED: YES
SUBSTANCE_ABUSE: 0

## 2020-11-25 ASSESSMENT — PAIN DESCRIPTION - PAIN TYPE
TYPE: ACUTE PAIN
TYPE: ACUTE PAIN

## 2020-11-25 NOTE — PROGRESS NOTES
"Pharmacy Kinetics 54 y.o. male on vancomycin day # 1 2020    Currently on Vancomycin 1000 mg iv q24hr (0700)  Provider specified end date: TBD    Indication for Treatment: SSTI    Pertinent history per medical record: Admitted as transfer on 2020 for infection of left knee surgery wound vac site. Pt admitted to [facility] 10/29 for concerns of necrotizing fasciitis, subsequent through the knee amputation. Wound vac placed , pt left AMA . PMH of methamphetamine abuse. Pt admitted to OSH  for heart failure exacerbation. Started on vancomycin and Zosyn prior to transfer here.    Other antibiotics: Zosyn 4.5 g q8h    Allergies: Cillins [penicillins], Erythromycin, Shellfish allergy, and Metoprolol     List concerns for renal function: BMI 41.96, concurrent Zosyn administration, history of high troughs, CHF    Pertinent cultures to date:   None ordered from current stay    : blood x2: no growth  10/30: wound: heavy S aureus growth in one tube      No results for input(s): WBC, NEUTSPOLYS, BANDSSTABS in the last 72 hours.  No results for input(s): BUN, CREATININE, ALBUMIN in the last 72 hours.  Recent Labs     20  0059   VANCORANDOM 15.1       Intake/Output Summary (Last 24 hours) at 2020 0425  Last data filed at 2020 0300  Gross per 24 hour   Intake 0 ml   Output 450 ml   Net -450 ml      /79   Pulse 72   Temp 36.9 °C (98.5 °F) (Temporal)   Resp 16   Ht 1.956 m (6' 5\")   Wt (!) 160.5 kg (353 lb 13.4 oz)   SpO2 92%  Temp (24hrs), Av.7 °C (98.1 °F), Min:36.6 °C (97.8 °F), Max:36.9 °C (98.5 °F)      A/P   1. Vancomycin dose change: new start at this facility (continuing from OSH)  2. Next vancomycin level:  @ 0630 in order to get true trough  3. Goal trough: 10-15 mcg/mL  4. Comments: vancomycin and Zosyn being continued for post-op wound infection. One gram dose of vancomycin given at OSH at 0700 on , random level from 0100 on  came back at 15.1, " therefore will continue with 1 gram q24h. Pharmacy will continue to follow.    Eric Wang, PharmD

## 2020-11-25 NOTE — PROGRESS NOTES
Transferred pt from [facility] on bariatric bed. Pt placed on tele-monitor/monitor room notified. Pt on 3L nasal cannula. No complaints of pain at this time. POC reviewed with pt, all questions answered. Call light within reach, will continue to monitor.

## 2020-11-25 NOTE — CARE PLAN
Problem: Communication  Goal: The ability to communicate needs accurately and effectively will improve  Outcome: PROGRESSING AS EXPECTED     Problem: Safety  Goal: Will remain free from injury  Outcome: PROGRESSING AS EXPECTED  Goal: Will remain free from falls  Outcome: PROGRESSING AS EXPECTED     Problem: Infection  Goal: Will remain free from infection  Outcome: PROGRESSING AS EXPECTED     Problem: Venous Thromboembolism (VTW)/Deep Vein Thrombosis (DVT) Prevention:  Goal: Patient will participate in Venous Thrombosis (VTE)/Deep Vein Thrombosis (DVT)Prevention Measures  Outcome: PROGRESSING AS EXPECTED     Problem: Bowel/Gastric:  Goal: Normal bowel function is maintained or improved  Outcome: PROGRESSING AS EXPECTED  Goal: Will not experience complications related to bowel motility  Outcome: PROGRESSING AS EXPECTED     Problem: Knowledge Deficit  Goal: Knowledge of disease process/condition, treatment plan, diagnostic tests, and medications will improve  Outcome: PROGRESSING AS EXPECTED  Goal: Knowledge of the prescribed therapeutic regimen will improve  Outcome: PROGRESSING AS EXPECTED     Problem: Discharge Barriers/Planning  Goal: Patient's continuum of care needs will be met  Outcome: PROGRESSING AS EXPECTED     Problem: Respiratory:  Goal: Respiratory status will improve  Outcome: PROGRESSING AS EXPECTED     Problem: Skin Integrity  Goal: Risk for impaired skin integrity will decrease  Outcome: PROGRESSING AS EXPECTED     Problem: Fluid Volume:  Goal: Will maintain balanced intake and output  Outcome: PROGRESSING AS EXPECTED     Problem: Pain Management  Goal: Pain level will decrease to patient's comfort goal  Outcome: PROGRESSING SLOWER THAN EXPECTED     Problem: Psychosocial Needs:  Goal: Level of anxiety will decrease  Outcome: PROGRESSING SLOWER THAN EXPECTED

## 2020-11-25 NOTE — DIETARY
NUTRITION SERVICES: BMI - Pt with BMI >40 (=Body mass index is 41.96 kg/m².), morbid obesity. Weight loss counseling not appropriate in acute care setting. RECOMMEND - Referral to outpatient nutrition services for weight management after D/C.

## 2020-11-25 NOTE — PROGRESS NOTES
Spoke with direct admitting physician, instructed not to contact original accepting physician, Dr. Dia, this evening.

## 2020-11-25 NOTE — ASSESSMENT & PLAN NOTE
Estimated right ventricular systolic pressure  is 40 mmHg on echo on 11/2  - likely secondary to CHF, COPD and SHASHANK

## 2020-11-25 NOTE — SENIOR ADMIT NOTE
HPI:  Patient is a 53 yo M PMHx HFpEF with hx of methamphetamine-induced and ischemic cardiomyopathy, CAD s/p CABG, severe COPD FEV1 35% on 2018 PFT's, PAF chadsvasc 4 on warfarin, type 2 DM (A1C 7.8 10/30/20), PAD and chronic hypoxia on 2L at home who presents as a transfer from hospital in [place], [place] with concern for infection at recent left knee surgery wound vac site.    Patient had 10/29 admit to [facility] for concern for necrotizing fasciitis of left leg, required 11/1 ICU transfer for sepsis and had left through the knee amputation 11/4 and was treated with antibiotic course with wound vac placement in hospital but left AMA 11/16. Patient presented with LUNA and was then admitted 11/20 at hospital in [place], [place] for heart failure and was meth positive (patient denies use) and was diuresed extensively. While there, his wound vac was noted to not be working and there appeared to be purulent discharge at the site for which patient was to be transferred for orthopaedic surgical evaluation 11/23 but was delayed to 11/24 due to transportation difficulty. Patient documented to have orders placed 11/23 for Vancomycin and Zosyn, unclear starting time. There was WBC 10.8 before abx started, but unclear if patient met sepsis criteria. When patient arrived here, he was wheezing profusely and breathing low 90's on 3L NC.    Physical Exam:  Patient appears to have significant wheezing in mild respiratory distress, fatigued and uncomfortable but nontoxic. Patient has diffuse biphasic wheezing, no crackles, slightly diminished throughout. Soft heart sounds, no murmur auscultated. No JVD appreciated, right leg with ankle edema. Left leg amputation site is clear, dry and intact with sutures in place with mild erythema no drainage, bandage at left lateral leg has dried serosanguineous fluid and moderate tenderness.    Assessment and Plan:  #Infection of wound vac site  #s/p left through knee amputation  #Chronic Hypoxic Respiratory  Failure  #COPD exacerbation, mild  #Acute on Chronic Heart Failure secondary to methamphetamine use, improving  #PAF, rate controlled  #Chronic anticoagulation with warfarin  -Patient transferred for wound infection secondary to wound vac failure, started on IV antibiotics with no current sepsis or severe infection but no current evaluation of deeper structures  -During transfer, patient appears to have exacerbated his COPD from unclear cause, severe on last PFT's but does not appear to be taking correct inhalers for lonstanding disease  -hx subtherapeutic INR after surgery recently, no s/s PE at this time    Plan:  -Admit patient to telemetry to assess for need for further diuresis  -Continue Vancomycin and Zosyn, can de-escalate as needed when wound re-assessed.   -Wound cx per [facility].  -U/S left arterial, U/S duplex venous b/l  -RT protocol, schedule q4 duonebs  -Starting inhaler treatment for severe COPD as indicated by PFT's  -No systemic steroids for now in setting of potential infection, covering abx  -Continue GDMT. May need assessment for need for lasix before surgery if planned.  -CXR, EKG  -Titrate O2 to 88-92%  -Hold warfarin, heparin for now. Risks/benefits of heparin options considered, will start therapeutic bridging rather than dvt ppx dosing of heparin given Chadsvasc score 4  -NPO midnight with meds  -Ortho surgery accepted, should assess in am  -Pain control: oxycodone, tylenol  -See intern H and P for further details

## 2020-11-25 NOTE — NON-PROVIDER
"  [facility] PROGRESS NOTE     Attending: Carlos Casillas M.D.  Senior Resident: Stalin Morse M.D. (PGY-3)  Julius Resident: Kinjal Barber M.D. (PGY-1)  Medical Student: Kinjal Sharma (MS3)  PATIENT: Joshua Medrano; [id number]; 1965    Subjective: No acute events overnight. This morning the patient reports no SOB but states he is in pain (8/10) due largely to the \"uncomfortable bed\". He expresses frustration that \"no one has told [him] why [he is] at the hospital.\" The patient lives in [place] with his wife, 2 children, and 4 grandchildren at home. He has a service dog at home as well.    OBJECTIVE:  Temp:  [36.6 °C (97.8 °F)-36.9 °C (98.5 °F)] 36.9 °C (98.5 °F)  Pulse:  [68-76] 68  Resp:  [16-19] 16  BP: (106-118)/(63-79) 118/67  SpO2:  [92 %-97 %] 94 %    Intake/Output Summary (Last 24 hours) at 11/25/2020 1153  Last data filed at 11/25/2020 0930  Gross per 24 hour   Intake 0 ml   Output 1100 ml   Net -1100 ml       PE:  Gen: Patient appears uncomfortable but no acute distress, propped up in bed, appears older than stated age.  HEENT: Normocephalic, atraumatic; EOMI, no scleral icterus.   Pulm: No respiratory distress, increased effort of breathing, wheezing and rhonchi present.  Cardio: Quiet heart sounds, difficult to appreciate. RRR. No murmurs or rubs.  Abdom: Obese abdomen, non-tender, non-distended abdomen.  Ext: Left AKA, surgical incision healing well with some erythema, open wound present on lateral aspect of stump; Right leg has multiple healing chronic wounds. Bilateral edema present.     LABS:  Recent Labs     11/25/20  0059   WBC 9.7   RBC 3.14*   HEMOGLOBIN 9.0*   HEMATOCRIT 31.3*   MCV 99.7*   MCH 28.7   RDW 71.0*   PLATELETCT 270   MPV 10.0   NEUTSPOLYS 83.40*   LYMPHOCYTES 7.00*   MONOCYTES 4.90   EOSINOPHILS 3.50   BASOPHILS 0.50   RBCMORPHOLO Present     Recent Labs     11/25/20  0059   SODIUM 134*   POTASSIUM 4.0   CHLORIDE 100   CO2 30   BUN 13   CREATININE 0.72   CALCIUM 8.7 "   MAGNESIUM 2.0   ALBUMIN 2.8*     Estimated GFR/CRCL = Estimated Creatinine Clearance: 190.7 mL/min (by C-G formula based on SCr of 0.72 mg/dL).  Recent Labs     11/25/20 0059 11/25/20  0605   GLUCOSE 129*  --    POCGLUCOSE  --  129*     Recent Labs     11/24/20 2331 11/25/20 0059 11/25/20 0133 11/25/20  0558   ASTSGOT  --  17  --   --    ALTSGPT  --  12  --   --    TBILIRUBIN  --  0.2  --   --    ALKPHOSPHAT  --  91  --   --    GLOBULIN  --  4.6*  --   --    INR 2.01*  --  1.97* 1.84*             Recent Labs     11/24/20 2331 11/25/20 0133 11/25/20 0558   INR 2.01* 1.97* 1.84*   APTT  --  43.1*  --        MICROBIOLOGY:   Blood Culture   Date Value Ref Range Status   11/07/2018 No growth after 5 days of incubation.  Final        IMAGING:  DX-CHEST-LIMITED (1 VIEW)   Final Result      Moderate bilateral interstitial opacities which likely represents pulmonary edema.      Stable cardiac silhouette enlargement.      US-EXTREMITY VENOUS LOWER BILAT    (Results Pending)   CT-ABDOMEN WITH & W/O    (Results Pending)   US-EXTREMITY ARTERY LOWER UNILAT LEFT    (Results Pending)       MEDS:  Current Facility-Administered Medications   Medication Last Admin   • budesonide-formoterol (SYMBICORT) 80-4.5 MCG/ACT inhaler 2 Puff 2 Puff at 11/25/20 0912   • piperacillin-tazobactam (ZOSYN) 4.5 g in  mL IVPB Stopped at 11/25/20 0959   • MD Alert...Vancomycin per Pharmacy     • heparin infusion 25,000 units in 500 mL 0.45% NACL 22 Units/kg/hr at 11/25/20 1138   • insulin regular (HumuLIN R,NovoLIN R) injection Stopped at 11/25/20 0600    And   • glucose 4 g chewable tablet 16 g      And   • dextrose 50% (D50W) injection 50 mL     • acetaminophen (TYLENOL) tablet 500 mg     • oxyCODONE immediate-release (ROXICODONE) tablet 5 mg      Or   • oxyCODONE immediate release (ROXICODONE) tablet 10 mg 10 mg at 11/25/20 0911   • vancomycin (VANCOCIN) 1,500 mg in  mL IVPB     • amiodarone (Cordarone) tablet 200 mg 200 mg at  11/25/20 0558   • ferrous sulfate tablet 325 mg 325 mg at 11/25/20 0558   • furosemide (LASIX) tablet 40 mg 40 mg at 11/25/20 0558   • ipratropium-albuterol (DUONEB) nebulizer solution 3 mL at 11/25/20 1041   • metoprolol SR (TOPROL XL) tablet 25 mg Stopped at 11/25/20 0600   • rosuvastatin (CRESTOR) tablet 40 mg 40 mg at 11/25/20 0558   • spironolactone (ALDACTONE) tablet 25 mg 25 mg at 11/25/20 0558   • senna-docusate (PERICOLACE or SENOKOT S) 8.6-50 MG per tablet 2 Tab      And   • polyethylene glycol/lytes (MIRALAX) PACKET 1 Packet      And   • magnesium hydroxide (MILK OF MAGNESIA) suspension 30 mL      And   • bisacodyl (DULCOLAX) suppository 10 mg     • Respiratory Therapy Consult           ASSESSMENT/PLAN: 54 y.o. male admitted for SOB secondary to CHF exacerbation and developing infection on left thigh, post-AKA.    #Soft Tissue Infection  Patient presented with open wound on the lateral aspect of left stump, post-AKA after patient left AMA with wound vac. Wound vac did not appear to be working properly on admission and pus was draining from the the stump. Surgical incision appears to be healing well with some erythema. Patient was started on Vancomycin and Zosyn on 11/22.   -Continue empiric antibiotics, awaiting culture results.  -Consult orthopedics (Dr. Durbin) to assess for further surgical need.  -Consult infectious disease for wound infection management.   -Begin cardiac and diabetic diet.    #CHF Exacerbation  Patient presented to the hospital for progressive SOB secondary to CHF exacerbation. A previous echo on 11/2 was notable for LVEF 50%. Patient was started on Toprol 25 mg q daily, Rosuvastatin 40 mg q daily, Lasix 40 mg q daily, and Aldactone 25 mg q daily.   -Continue medication regimen and continue to monitor patient.  -Monitor I/Os and daily weights.    #Paroxysmal a fib  Patient presented with a fib at time of admission. He is currently rhythm controlled on amiodarone 200 mg a daily.  Heparin was initiated in ED.  -Continue amiodarone 200 mg and heparin; bridge to coumadin.   -Continue to monitor.     #COPD  Patient has a history of COPD and presented with acute exacerbation, with wheezing on exam and shortness of breath. RT scheduled duonebs q4 and albuterol PRN.   -Continue prescribed medication regimen.  -Continue Symbicort 2 puffs q daily.   -Avoid systemic steroids due to active soft tissue infection.    #CAD  Patient has a history of severe CAD. Patient has multiple healing chronic wounds on right lower leg. Peripheral pulse difficult to appreciate due to edema secondary to CHF exacerbation.   -Doppler U/S to assess severity of CAD

## 2020-11-25 NOTE — ASSESSMENT & PLAN NOTE
s/p initial left intitial knee amputation on 11/2/20 with Dr. Durbin, followed by completion of left AKA on 11/12.  Patient left [facility] AMA on 11/17.  Presented to [facility] on 11/20 with purulent drainage from wound vac insertion site, with wound vac removal at outside hospital.   - Infectious Disease and Orthopedic Surgery consulted this admission.  -I&D by orthopedic Sx on 11/26, wound VAC placed  -Pain control  -Per ID, continue Ancef x 6 weeks (end date 2/10/2020).  If patient refuses or tries to leave A, ID recommends p.o. Augmentin 875 mg twice daily for 6 weeks along with outpatient wound care  - ID initially added fluconazole on 11/29 for OR cultures growing yeast.  On 11/30: ID stop fluconazole & started on Micafungin for concerning new rash.   PICC placed 12/1/2020.  Needs placement for long term IV Abx.

## 2020-11-25 NOTE — ASSESSMENT & PLAN NOTE
Continue amiodarone 200 mg daily & metoprolol 25 mg daily  [id number]    Warfarin per Pharmacy - INR 2.38 on 12/6

## 2020-11-25 NOTE — ASSESSMENT & PLAN NOTE
PMH of CAD and NSTEMI  Patient recently admitted to [facility] found to have CHF exacerbation  - Last echo on 11/2  notable for LVEF 50%, Mild aortic stenosis,   Toprol 25mg QAM  Rosuvastatin 40mg daily  Lasix 40mg daily  Aldactone 25mg daily  Daily weights and I/Os  - Resolved.  Back to home dose of furosemide.

## 2020-11-25 NOTE — H&P
History & Physical Note    Date of Admission: 11/25/2020  Admission Status: Inpatient  [facility] Team:   Attending: Dr. Espana  Senior Resident: Dr. Meehan  Intern: Dr. Kan  Contact Number: [phone]    Chief Complaint: transfer for surgical intervention    History of Present Illness (HPI):   Joshua is a 54 y.o. male who presented 11/24/2020  as a transfer from [facility] where he was admitted from 11/20 to 11/23 for CHF exacerbation after presenting with increased shortness of breath. During this admission, initial utox was positive for meth, CXR revealed bilateral infiltrates greater on the left and echo revealed EF of 50%.  He was diuresed, however during this admission patient's wound vac was not functioning properly, and wound was noted to be draining pus. Patient received vancomycin and Zosyn beginning on 11/22. Orthopedics were consulted,  Dr. Christie recommended removing the vac and sutures. He was then transferred to [facility] to care for post  surgical left AKA developing infection.     Patient was recently admitted at [facility] from  10/29 to 11/16 for sepsis secondary to cellulitis and group A bacteremia complicated by CHF exacerbation and hypotension. Patient also noted to have severe PAD. On 11/4/20 he underwent a left sided guillotine through the knee amputation with Dr. Durbin. Post operatively he had significant bleeding from his wound site and he did require some perioperative transfusions and coumadin was held. He completed a course course of Cefazolin during this stay but left AMA with wound vac.     Patient was seen at bedside in respiratory distress  saturating at 93% on 3 L NC /63, P75. He reported shortness of breath with trouble completing sentences.  He denied any chest pain but reported some abdominal pain with inspiration. He also reported some dizziness. Patient states that shortness of breath began the day before he was admitted to [facility]. He reported  some tenderness along his left leg but denied any fevers or chills.     Review of Systems:Review of Systems   Constitutional: Negative for chills and fever.   HENT: Negative for congestion, hearing loss and sinus pain.    Eyes: Negative for blurred vision, double vision and photophobia.   Respiratory: Positive for cough, sputum production, shortness of breath and wheezing. Negative for hemoptysis.    Cardiovascular: Positive for leg swelling. Negative for chest pain and palpitations.   Gastrointestinal: Positive for abdominal pain. Negative for constipation, diarrhea, nausea and vomiting.   Genitourinary: Negative for flank pain and hematuria.   Musculoskeletal: Negative for falls, joint pain and myalgias.   Skin: Negative for itching and rash.   Neurological: Positive for dizziness. Negative for tingling, weakness and headaches.   Psychiatric/Behavioral: Negative for hallucinations and substance abuse.     Past Medical History:   Past Medical History was reviewed with patient.   has a past medical history of ASTHMA, Atrial fibrillation (HCC), CAD (coronary artery disease), Chronic obstructive pulmonary disease (HCC), Congestive heart failure (HCC), and Diabetes.     Past Surgical History: Past Surgical History was reviewed with patient.   has a past surgical history that includes other abdominal surgery; multiple coronary artery bypass endo vein harvest (12/22/2017); oscar (12/22/2017); thoracoscopy (Left, 1/25/2018); colonoscopy - endo (N/A, 7/25/2018); knee amputation below (Left, 11/4/2020); and knee amputation above (Left, 11/12/2020).    Medications: Medications have been reviewed with patient.  Prior to Admission Medications   Prescriptions Last Dose Informant Patient Reported? Taking?   albuterol 108 (90 Base) MCG/ACT Aero Soln inhalation aerosol  Patient Yes No   Sig: Inhale 2 Puffs by mouth every 6 hours as needed for Shortness of Breath.   amiodarone (CORDARONE) 200 MG Tab   No No   Sig: Take 1 Tab by mouth  every morning.   ferrous sulfate 325 (65 Fe) MG tablet  Patient Yes No   Sig: Take 325 mg by mouth every morning.   furosemide (LASIX) 40 MG Tab   Yes Yes   Sig: Take 40 mg by mouth every day.   ipratropium-albuterol (DUONEB) 0.5-2.5 (3) MG/3ML nebulizer solution  Patient No No   Sig: 3 mL by Nebulization route every 6 hours as needed for Shortness of Breath.   lisinopril (PRINIVIL) 2.5 MG Tab   No No   Sig: Take 1 Tab by mouth every morning.   metoprolol SR (TOPROL XL) 25 MG TABLET SR 24 HR   No No   Sig: Take 1 Tab by mouth every morning.   potassium chloride SA (K-DUR) 10 MEQ Tab CR   Yes Yes   Sig: Take 10 mEq by mouth every day.   rosuvastatin (CRESTOR) 40 MG tablet   No No   Sig: Take 1 Tab by mouth every morning.   spironolactone (ALDACTONE) 25 MG Tab   No No   Sig: Take 1 Tab by mouth every morning.   tiotropium (SPIRIVA) 18 MCG Cap  Patient Yes No   Sig: Inhale 18 mcg by mouth every morning.   warfarin (COUMADIN) 7.5 MG Tab   Yes Yes   Sig: Take 7.5 mg by mouth every day.      Facility-Administered Medications: None        Allergies: Allergies have been not reviewed with patient.  Allergies   Allergen Reactions   • Cillins [Penicillins] Anaphylaxis and Rash     As a child; tolerated cefepime (12/2017), ceftriaxone (1/2018), cefazolin (12/2017)   • Erythromycin Anaphylaxis     Rxn - 1994   • Shellfish Allergy Anaphylaxis     Pt stated that he is allergic to all seafood, will develop a rash and says that rash will clear up with benadryl   • Metoprolol Unspecified     Pt stated got latham and aggressive, in demi dose's per wife.          Family History:   family history includes Alcohol/Drug in his brother; Diabetes in his father, mother, and sister; Heart Disease in his brother; Leukemia in his mother; Lung Disease in his brother; No Known Problems in his sister; Stroke in his mother.     Social History:   Tobacco: former smoker, quit years ago  Alcohol: denies  Recreational drugs (illegal and prescription):  denies  Employment: not currently employed  Activity Level: unable to assess at this time  Living situation:  Lives with wife  Recent travel:  none  Primary Care Provider: anika Le M.D. (Inactive)  Other (stressors, spirituality, exposures):  NA  Physical Exam:   Vitals:  Temp:  [36.6 °C (97.8 °F)-36.7 °C (98.1 °F)] 36.6 °C (97.8 °F)  Pulse:  [74-76] 74  Resp:  [16] 16  BP: (107-111)/(63-69) 111/69  SpO2:  [93 %-95 %] 94 %    Physical Exam  Constitutional:       General: He is in acute distress.      Appearance: He is obese. He is not toxic-appearing.   HENT:      Head: Normocephalic and atraumatic.      Right Ear: External ear normal.      Left Ear: External ear normal.      Nose: Nose normal. No congestion.      Mouth/Throat:      Mouth: Mucous membranes are moist.      Pharynx: Oropharynx is clear. No oropharyngeal exudate.   Eyes:      General: No scleral icterus.        Right eye: No discharge.      Conjunctiva/sclera: Conjunctivae normal.   Neck:      Musculoskeletal: No muscular tenderness.   Cardiovascular:      Rate and Rhythm: Rhythm irregular.      Heart sounds: No murmur.      Comments: Faint heart sounds,irregular rythm  Pulmonary:      Effort: Respiratory distress present.      Breath sounds: No stridor. Wheezing and rhonchi present.   Chest:      Chest wall: No tenderness.   Abdominal:      Palpations: There is no mass.      Tenderness: There is no abdominal tenderness. There is no rebound.      Hernia: No hernia is present.      Comments: Hypoactive bowel sounds   Musculoskeletal:         General: No swelling, tenderness, deformity or signs of injury.      Right lower leg: Edema present.      Left lower leg: Edema present.      Comments: 2+ pitting edema, left left at knee amputation.   Lymphadenopathy:      Cervical: No cervical adenopathy.   Skin:     Comments: Venous statis changes right lower extremitis with some hyperkeratosis. Small amount of  purulent drainage under bandage on  left lateral thigh   Neurological:      General: No focal deficit present.      Mental Status: He is alert and oriented to person, place, and time.      Sensory: No sensory deficit.       Labs: pending      EKG: Per my read,  sinus rhythm. left axis deviation, poor R wave progression, possible new left anterior fascicular block compared to 11/11/19     Imaging: pending      Previous Data Review: reviewed    Problem Representation: * Soft tissue infection  Assessment & Plan  Concern for post operative infection after patient left AMA with wound vac following last admission.   - small amount of drainage on examination today  - Orthopedics,  Dr. Durbin following patient  - Continue Vancomycin and Zosyn ( since 11/22)  - Roxicodone and Tylenol PRN for pain  - lower extremity  Venous and arterial ultrasound pending      Paroxysmal atrial fibrillation (HCC)- (present on admission)  Assessment & Plan  Currently rhythm controlled on Continue Amiodarone 200mg QAM  [id number] =3    INR 2.01 on 11/24  Will initiate therapeutic heparin for now and plan to to bridge to Coumadin  Will reinitiate Coumadin after surgery    COPD (chronic obstructive pulmonary disease) ([facility])- (present on admission)  Assessment & Plan  - COPD exacerbation on admission  - PFTS from 2018 revealed FVC 37% predicted.  FEV1 35% predicted.  No   significant response to bronchodilators.  FEV1/FVC is 73. Total lung capacity 5.29,     Diffusion DLCO 41% predicted indicating mixed obstructive and restrictive disease  - Patient  Presented with wheezing on exam and shortness of breath  - duonebs Q4 scheduled per RT in addition to PRN albuterol  - Symbicort 2 puff QAM  - acapella treatment  - will avoid systemic steroids at this time due to active soft tissue infection  - CXR pending    Pulmonary HTN ([facility])  Assessment & Plan  Estimated right ventricular systolic pressure  is 40 mmHg on echo on 11/2  - likely secondary to CHF, COPD and SHASHANK  - continue to  managemCOPD and CHF exacerbation      Acute exacerbation of CHF (congestive heart failure) (HCC)- (present on admission)  Assessment & Plan  PMH of CAD and NSTEMI  Patient recently admitted to [facility] found to have CHF exacerbation  - Last echo on 11/2  notable for LVEF 50%, Mild aortic stenosis,   Toprol 25mg QAM  Rosuvastatin 40mg daily  Lasix 40mg daily  Aldactone 25mg daily  Daily weights and I/Os    Type 2 diabetes mellitus with complication, without long-term current use of insulin ([facility])- (present on admission)  Assessment & Plan  - A1c 7.8 on 10/30/2020  - ISS  - Patient NPO for surgery    SHASHANK (obstructive sleep apnea)- (present on admission)  Assessment & Plan  CPAP per RT    CKD (chronic kidney disease), stage III- (present on admission)  Assessment & Plan  - continue to monitor  - Creatinine at baseline at 0.69    Essential hypertension- (present on admission)  Assessment & Plan  Continue home medications with holding parameters

## 2020-11-25 NOTE — PROGRESS NOTES
[facility] PROGRESS NOTE     Attending: Carlos Casillas MD  Senior Resident: Stalin Morse MD(PGY-2)  Julius Resident: Kinjal Barber MD (PGY-1)    PATIENT: Joshua Medrano; [id number]; 1965    Subjective: No acute overnight events. Joshua reports being uncomfortable in current bariatric bed. Also reports being very hungry and would like to eat- discussed with patient that team is waiting to hear back from Orthopedics team.     OBJECTIVE:  Temp:  [36.4 °C (97.6 °F)-36.9 °C (98.5 °F)] 36.4 °C (97.6 °F)  Pulse:  [68-76] 71  Resp:  [16-19] 16  BP: (106-118)/(59-79) 111/59  SpO2:  [90 %-97 %] 90 %    Intake/Output Summary (Last 24 hours) at 11/25/2020 1317  Last data filed at 11/25/2020 0930  Gross per 24 hour   Intake 0 ml   Output 1100 ml   Net -1100 ml       PE:  General: Overweight and uncomfortable appearing man in no acute distress, resting on arrival to room, appears older than stated age  HEENT: Normocephalic, atraumatic, nares patent  Cardiovascular: RRR, no murmurs, gallops, or rubs  Pulmonary: Diminished bilateral breath sounds with rare crackles and common expiratory wheezes bilaterally, on oxygen delivered via nasal cannula  Abdominal: Obese abdomen, normoactive bowel sounds, non-tender to palpation, no guarding, rigidity, or distension  Extremities: Moves all spontaneously, left above-knee amputation with well-approximated surgical incision site, area of purulent drainage from left inferior lateral amputation site, right lower extremity 3+ pitting edema with icchythosis of entire limb below knee  Neurological: Alert and oriented    LABS:  Recent Labs     11/25/20  0059   WBC 9.7   RBC 3.14*   HEMOGLOBIN 9.0*   HEMATOCRIT 31.3*   MCV 99.7*   MCH 28.7   RDW 71.0*   PLATELETCT 270   MPV 10.0   NEUTSPOLYS 83.40*   LYMPHOCYTES 7.00*   MONOCYTES 4.90   EOSINOPHILS 3.50   BASOPHILS 0.50   RBCMORPHOLO Present     Recent Labs     11/25/20  0059   SODIUM 134*   POTASSIUM 4.0   CHLORIDE 100   CO2 30   BUN 13    CREATININE 0.72   CALCIUM 8.7   MAGNESIUM 2.0   ALBUMIN 2.8*     Estimated GFR/CRCL = Estimated Creatinine Clearance: 190.7 mL/min (by C-G formula based on SCr of 0.72 mg/dL).  Recent Labs     11/25/20 0059 11/25/20  0605   GLUCOSE 129*  --    POCGLUCOSE  --  129*     Recent Labs     11/24/20 2331 11/25/20 0059 11/25/20 0133 11/25/20  0558   ASTSGOT  --  17  --   --    ALTSGPT  --  12  --   --    TBILIRUBIN  --  0.2  --   --    ALKPHOSPHAT  --  91  --   --    GLOBULIN  --  4.6*  --   --    INR 2.01*  --  1.97* 1.84*             Recent Labs     11/24/20 2331 11/25/20 0133 11/25/20 0558   INR 2.01* 1.97* 1.84*   APTT  --  43.1*  --        IMAGING:   DX-CHEST-LIMITED (1 VIEW)   Final Result      Moderate bilateral interstitial opacities which likely represents pulmonary edema.      Stable cardiac silhouette enlargement.      US-EXTREMITY VENOUS LOWER BILAT    (Results Pending)   CT-ABDOMEN WITH & W/O    (Results Pending)   US-EXTREMITY ARTERY LOWER UNILAT LEFT    (Results Pending)   CT-EXTREMITY, LOWER WITH & W/O LEFT    (Results Pending)       MEDS:  Current Facility-Administered Medications   Medication Last Admin   • budesonide-formoterol (SYMBICORT) 80-4.5 MCG/ACT inhaler 2 Puff 2 Puff at 11/25/20 0912   • piperacillin-tazobactam (ZOSYN) 4.5 g in  mL IVPB Stopped at 11/25/20 0959   • heparin infusion 25,000 units in 500 mL 0.45% NACL 22 Units/kg/hr at 11/25/20 1138   • insulin regular (HumuLIN R,NovoLIN R) injection Stopped at 11/25/20 0600    And   • glucose 4 g chewable tablet 16 g      And   • dextrose 50% (D50W) injection 50 mL     • acetaminophen (TYLENOL) tablet 500 mg     • oxyCODONE immediate-release (ROXICODONE) tablet 5 mg      Or   • oxyCODONE immediate release (ROXICODONE) tablet 10 mg 10 mg at 11/25/20 0911   • pneumococcal 13-Parad[place] Conj Vacc (PREVNAR 13) syringe 0.5 mL     • influenza vaccine quad injection 0.5 mL     • linezolid (ZYVOX) tablet 600 mg     • amiodarone (Cordarone) tablet  200 mg 200 mg at 11/25/20 0558   • ferrous sulfate tablet 325 mg 325 mg at 11/25/20 0558   • furosemide (LASIX) tablet 40 mg 40 mg at 11/25/20 0558   • ipratropium-albuterol (DUONEB) nebulizer solution 3 mL at 11/25/20 1041   • metoprolol SR (TOPROL XL) tablet 25 mg Stopped at 11/25/20 0600   • rosuvastatin (CRESTOR) tablet 40 mg 40 mg at 11/25/20 0558   • spironolactone (ALDACTONE) tablet 25 mg 25 mg at 11/25/20 0558   • senna-docusate (PERICOLACE or SENOKOT S) 8.6-50 MG per tablet 2 Tab      And   • polyethylene glycol/lytes (MIRALAX) PACKET 1 Packet      And   • magnesium hydroxide (MILK OF MAGNESIA) suspension 30 mL      And   • bisacodyl (DULCOLAX) suppository 10 mg     • Respiratory Therapy Consult         ASSESSMENT/PLAN: Joshua Medrano is a 54 y.o. male with PMH of CAD, DM, COPD, CHF, and A-fib who had recent left AKA on 11/12 who left [place] from hospital, admitted on 11/24/20 as a transfer from [place], [place] for left AKA wound infection and shortness of breath secondary to CHF.     #Left AKA Wound Infection / Post-op Care Left AKA  Patient had initial left through knee amputation on 11/2/20 with Dr. Durbin, followed by completion of left AKA on 11/12. Procedures performed secondary to significant infection without anticipation of wounds healing without amputation. At that time drain still in wound. Patient left [facility] on 11/17. Presented to [facility] on 11/20 with purulent drainage from wound vac insertion site, with wound vac removal at outside hospital. Patient noted to have other signs of infection and subsequently transfer arranged to [facility] due to his prior surgeries being performed here.   - Infectious Disease consulted, appreciate the recommendations  - Orthopedic Surgery consulted, appreciate the recommendations  - ID recommending cefazolin 2g IV Q8H based on prior sensitivities  - Orthopedics recommending CT limb to assess for fluid collection, may perform I&D pending results  - NPO at midnight for  procedure, can cancel if no OR planned  - Presently no signs of sepsis, consider fluid boluses only as needed given CHF  - Acetaminophen & oxycodone as needed for pain    #Congestive Heart Failure / Chronic Systolic Heart Failure / Peripheral Edema  Patient presented to outside hospital for concerns of worsening shortness of breath, found to have lung findings concerning for CHF. Wheezing and crackles on examination. Echocardiogram performed 11/2 revealed left ventricular ejection fraction of 50% with RSVP of 40 mmHg, largely unchanged from October study. Patient's new worsening of CHF likely secondary to poor medication adherence given his prior AMA departure from hospital, though could also be secondary to worsened methamphetamine induced cardiomyopathy.   - Furosemide 40 mg PO daily  - Metoprolol 25 mg PO daily  - Spironolactone 25 mg PO daily  - Patient discharged on lisinopril 2.5 mg PO daily, will consider restarting in next 1-2 days    #COPD  Patient with significant wheezing on examination and known history of COPD with mixed obstructive/restrictive pattern on most recently reported PFTs. COPD perhaps exacerbated by methamphetamine use.   - Budesonide-formoterol 2 puffs BID  - Presently scheduled Q4H duonebs, can change to PRN as wheezing improves  - Albuterol PRN    #Hx of Paroxysmal Atrial Fibrillation  - Heparin initiated at admission  - Planned bridge to warfarin   - Metoprolol as above    #PAD   Patient with known significant peripheral arterial disease resulting in AKA.  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 Hgb A1c 7.8 in October of this year.   - Insulin 1 unit per 50 BGL > 150 mg/dL  - Hypoglycemia protocol    #Methamphetamine Abuse  Patient with reported positive UDS for methamphetamine at [facility]. Patient denies at present.  - Will review records from outside hospital  - Social work consultation to assess for  resource needs at discharge    #Diet / Fluids  - Diabetic/cardiac diet    #Bowel Regimen  - Senna/docusate, polyethylene glycol, milk of magnesia, bisacodyl PRN     #DVT Prophylaxis  Heparin drip initiated for pre-op setting, plan for re-initiation of warfarin after procedure per admitting team notes.  - Heparin per protocol    #Disposition  Inpatient for continued management; likely to transfer off telemetry as no current cardiac monitor needs.     Stalin Morse M.D.  Family Medicine Resident  PGY-2

## 2020-11-25 NOTE — PROGRESS NOTES
Paged MD about hep gtt order conflicting with POC. Hep gtt on hold for now per [facility] med until further clarification.

## 2020-11-26 ENCOUNTER — ANESTHESIA (OUTPATIENT)
Dept: SURGERY | Facility: MEDICAL CENTER | Age: 55
DRG: 565 | End: 2020-11-26
Payer: MEDICARE

## 2020-11-26 ENCOUNTER — ANESTHESIA EVENT (OUTPATIENT)
Dept: SURGERY | Facility: MEDICAL CENTER | Age: 55
DRG: 565 | End: 2020-11-26
Payer: MEDICARE

## 2020-11-26 LAB
GLUCOSE BLD-MCNC: 113 MG/DL (ref 65–99)
GLUCOSE BLD-MCNC: 116 MG/DL (ref 65–99)
GLUCOSE BLD-MCNC: 121 MG/DL (ref 65–99)
GLUCOSE BLD-MCNC: 147 MG/DL (ref 65–99)
GRAM STN SPEC: NORMAL
GRAM STN SPEC: NORMAL
INR PPP: 1.6 (ref 0.87–1.13)
PROTHROMBIN TIME: 19.5 SEC (ref 12–14.6)
SIGNIFICANT IND 70042: NORMAL
SIGNIFICANT IND 70042: NORMAL
SITE SITE: NORMAL
SITE SITE: NORMAL
SOURCE SOURCE: NORMAL
SOURCE SOURCE: NORMAL
UFH PPP CHRO-ACNC: 0.18 IU/ML
UFH PPP CHRO-ACNC: 0.2 IU/ML

## 2020-11-26 PROCEDURE — 700101 HCHG RX REV CODE 250: Performed by: FAMILY MEDICINE

## 2020-11-26 PROCEDURE — 700111 HCHG RX REV CODE 636 W/ 250 OVERRIDE (IP): Performed by: STUDENT IN AN ORGANIZED HEALTH CARE EDUCATION/TRAINING PROGRAM

## 2020-11-26 PROCEDURE — A9270 NON-COVERED ITEM OR SERVICE: HCPCS | Performed by: STUDENT IN AN ORGANIZED HEALTH CARE EDUCATION/TRAINING PROGRAM

## 2020-11-26 PROCEDURE — 90715 TDAP VACCINE 7 YRS/> IM: CPT | Performed by: INTERNAL MEDICINE

## 2020-11-26 PROCEDURE — 160048 HCHG OR STATISTICAL LEVEL 1-5: Performed by: ORTHOPAEDIC SURGERY

## 2020-11-26 PROCEDURE — 36415 COLL VENOUS BLD VENIPUNCTURE: CPT

## 2020-11-26 PROCEDURE — 500367 HCHG DRAIN KIT, HEMOVAC: Performed by: ORTHOPAEDIC SURGERY

## 2020-11-26 PROCEDURE — 502240 HCHG MISC OR SUPPLY RC 0272: Performed by: ORTHOPAEDIC SURGERY

## 2020-11-26 PROCEDURE — 87106 FUNGI IDENTIFICATION YEAST: CPT

## 2020-11-26 PROCEDURE — 87075 CULTR BACTERIA EXCEPT BLOOD: CPT | Mod: 91

## 2020-11-26 PROCEDURE — 700102 HCHG RX REV CODE 250 W/ 637 OVERRIDE(OP): Performed by: STUDENT IN AN ORGANIZED HEALTH CARE EDUCATION/TRAINING PROGRAM

## 2020-11-26 PROCEDURE — 770004 HCHG ROOM/CARE - ONCOLOGY PRIVATE *

## 2020-11-26 PROCEDURE — 700102 HCHG RX REV CODE 250 W/ 637 OVERRIDE(OP): Performed by: ANESTHESIOLOGY

## 2020-11-26 PROCEDURE — 90471 IMMUNIZATION ADMIN: CPT

## 2020-11-26 PROCEDURE — 94640 AIRWAY INHALATION TREATMENT: CPT

## 2020-11-26 PROCEDURE — A9270 NON-COVERED ITEM OR SERVICE: HCPCS | Performed by: ANESTHESIOLOGY

## 2020-11-26 PROCEDURE — 700111 HCHG RX REV CODE 636 W/ 250 OVERRIDE (IP): Performed by: ANESTHESIOLOGY

## 2020-11-26 PROCEDURE — 85520 HEPARIN ASSAY: CPT

## 2020-11-26 PROCEDURE — 501330 HCHG SET, CYSTO IRRIG TUBING: Performed by: ORTHOPAEDIC SURGERY

## 2020-11-26 PROCEDURE — 99233 SBSQ HOSP IP/OBS HIGH 50: CPT | Performed by: STUDENT IN AN ORGANIZED HEALTH CARE EDUCATION/TRAINING PROGRAM

## 2020-11-26 PROCEDURE — 160027 HCHG SURGERY MINUTES - 1ST 30 MINS LEVEL 2: Performed by: ORTHOPAEDIC SURGERY

## 2020-11-26 PROCEDURE — 160036 HCHG PACU - EA ADDL 30 MINS PHASE I: Performed by: ORTHOPAEDIC SURGERY

## 2020-11-26 PROCEDURE — 700111 HCHG RX REV CODE 636 W/ 250 OVERRIDE (IP): Performed by: INTERNAL MEDICINE

## 2020-11-26 PROCEDURE — 501838 HCHG SUTURE GENERAL: Performed by: ORTHOPAEDIC SURGERY

## 2020-11-26 PROCEDURE — 87070 CULTURE OTHR SPECIMN AEROBIC: CPT

## 2020-11-26 PROCEDURE — 82962 GLUCOSE BLOOD TEST: CPT | Mod: 91

## 2020-11-26 PROCEDURE — 700105 HCHG RX REV CODE 258: Performed by: ANESTHESIOLOGY

## 2020-11-26 PROCEDURE — 160035 HCHG PACU - 1ST 60 MINS PHASE I: Performed by: ORTHOPAEDIC SURGERY

## 2020-11-26 PROCEDURE — 87015 SPECIMEN INFECT AGNT CONCNTJ: CPT

## 2020-11-26 PROCEDURE — 94760 N-INVAS EAR/PLS OXIMETRY 1: CPT

## 2020-11-26 PROCEDURE — 0Y9D0ZZ DRAINAGE OF LEFT UPPER LEG, OPEN APPROACH: ICD-10-PCS | Performed by: ORTHOPAEDIC SURGERY

## 2020-11-26 PROCEDURE — 160002 HCHG RECOVERY MINUTES (STAT): Performed by: ORTHOPAEDIC SURGERY

## 2020-11-26 PROCEDURE — 160009 HCHG ANES TIME/MIN: Performed by: ORTHOPAEDIC SURGERY

## 2020-11-26 PROCEDURE — A6454 SELF-ADHER BAND W>=3" <5"/YD: HCPCS | Performed by: ORTHOPAEDIC SURGERY

## 2020-11-26 PROCEDURE — 500881 HCHG PACK, EXTREMITY: Performed by: ORTHOPAEDIC SURGERY

## 2020-11-26 PROCEDURE — 87205 SMEAR GRAM STAIN: CPT | Mod: 91

## 2020-11-26 PROCEDURE — 85610 PROTHROMBIN TIME: CPT

## 2020-11-26 PROCEDURE — 94669 MECHANICAL CHEST WALL OSCILL: CPT

## 2020-11-26 PROCEDURE — 87186 SC STD MICRODIL/AGAR DIL: CPT

## 2020-11-26 RX ORDER — QUETIAPINE FUMARATE 25 MG/1
12.5 TABLET, FILM COATED ORAL DAILY
Status: DISCONTINUED | OUTPATIENT
Start: 2020-11-26 | End: 2020-12-08 | Stop reason: HOSPADM

## 2020-11-26 RX ORDER — DIPHENHYDRAMINE HYDROCHLORIDE 50 MG/ML
12.5 INJECTION INTRAMUSCULAR; INTRAVENOUS
Status: DISCONTINUED | OUTPATIENT
Start: 2020-11-26 | End: 2020-11-26 | Stop reason: HOSPADM

## 2020-11-26 RX ORDER — LIDOCAINE HYDROCHLORIDE 10 MG/ML
INJECTION, SOLUTION EPIDURAL; INFILTRATION; INTRACAUDAL; PERINEURAL PRN
Status: DISCONTINUED | OUTPATIENT
Start: 2020-11-26 | End: 2020-11-26 | Stop reason: SURG

## 2020-11-26 RX ORDER — SODIUM CHLORIDE, SODIUM LACTATE, POTASSIUM CHLORIDE, CALCIUM CHLORIDE 600; 310; 30; 20 MG/100ML; MG/100ML; MG/100ML; MG/100ML
INJECTION, SOLUTION INTRAVENOUS
Status: DISCONTINUED | OUTPATIENT
Start: 2020-11-26 | End: 2020-11-26 | Stop reason: SURG

## 2020-11-26 RX ORDER — WARFARIN SODIUM 10 MG/1
10 TABLET ORAL
Status: COMPLETED | OUTPATIENT
Start: 2020-11-26 | End: 2020-11-26

## 2020-11-26 RX ORDER — HALOPERIDOL 5 MG/ML
1 INJECTION INTRAMUSCULAR
Status: DISCONTINUED | OUTPATIENT
Start: 2020-11-26 | End: 2020-11-26 | Stop reason: HOSPADM

## 2020-11-26 RX ORDER — ALPRAZOLAM 0.5 MG/1
0.5 TABLET ORAL 4 TIMES DAILY PRN
Status: DISCONTINUED | OUTPATIENT
Start: 2020-11-26 | End: 2020-12-08 | Stop reason: HOSPADM

## 2020-11-26 RX ORDER — ONDANSETRON 2 MG/ML
4 INJECTION INTRAMUSCULAR; INTRAVENOUS
Status: DISCONTINUED | OUTPATIENT
Start: 2020-11-26 | End: 2020-11-26 | Stop reason: HOSPADM

## 2020-11-26 RX ADMIN — CEFAZOLIN SODIUM 2 G: 2 INJECTION, SOLUTION INTRAVENOUS at 22:38

## 2020-11-26 RX ADMIN — QUETIAPINE FUMARATE 12.5 MG: 25 TABLET ORAL at 14:27

## 2020-11-26 RX ADMIN — PROPOFOL 100 MG: 10 INJECTION, EMULSION INTRAVENOUS at 10:00

## 2020-11-26 RX ADMIN — SODIUM CHLORIDE, POTASSIUM CHLORIDE, SODIUM LACTATE AND CALCIUM CHLORIDE: 600; 310; 30; 20 INJECTION, SOLUTION INTRAVENOUS at 09:49

## 2020-11-26 RX ADMIN — DOCUSATE SODIUM 50 MG AND SENNOSIDES 8.6 MG 2 TABLET: 8.6; 5 TABLET, FILM COATED ORAL at 14:27

## 2020-11-26 RX ADMIN — IPRATROPIUM BROMIDE AND ALBUTEROL SULFATE 3 ML: 2.5; .5 SOLUTION RESPIRATORY (INHALATION) at 08:44

## 2020-11-26 RX ADMIN — LIDOCAINE HYDROCHLORIDE 50 MG: 10 INJECTION, SOLUTION EPIDURAL; INFILTRATION; INTRACAUDAL; PERINEURAL at 09:55

## 2020-11-26 RX ADMIN — AMIODARONE HYDROCHLORIDE 200 MG: 200 TABLET ORAL at 06:56

## 2020-11-26 RX ADMIN — FERROUS SULFATE TAB 325 MG (65 MG ELEMENTAL FE) 325 MG: 325 (65 FE) TAB at 06:55

## 2020-11-26 RX ADMIN — CEFAZOLIN SODIUM 2 G: 2 INJECTION, SOLUTION INTRAVENOUS at 06:56

## 2020-11-26 RX ADMIN — FUROSEMIDE 40 MG: 40 TABLET ORAL at 06:55

## 2020-11-26 RX ADMIN — ACETAMINOPHEN 500 MG: 500 TABLET ORAL at 04:13

## 2020-11-26 RX ADMIN — WARFARIN SODIUM 10 MG: 10 TABLET ORAL at 17:28

## 2020-11-26 RX ADMIN — FENTANYL CITRATE 50 MCG: 50 INJECTION, SOLUTION INTRAMUSCULAR; INTRAVENOUS at 11:00

## 2020-11-26 RX ADMIN — ALPRAZOLAM 0.5 MG: 0.5 TABLET ORAL at 14:23

## 2020-11-26 RX ADMIN — OXYCODONE HYDROCHLORIDE 10 MG: 10 TABLET ORAL at 17:24

## 2020-11-26 RX ADMIN — HYDROCODONE BITARTRATE AND ACETAMINOPHEN 15 MG: 7.5; 325 SOLUTION ORAL at 10:51

## 2020-11-26 RX ADMIN — PROPOFOL 200 MG: 10 INJECTION, EMULSION INTRAVENOUS at 09:55

## 2020-11-26 RX ADMIN — CEFAZOLIN SODIUM 2 G: 2 INJECTION, SOLUTION INTRAVENOUS at 14:23

## 2020-11-26 RX ADMIN — SPIRONOLACTONE 25 MG: 25 TABLET ORAL at 06:55

## 2020-11-26 RX ADMIN — FERROUS SULFATE TAB 325 MG (65 MG ELEMENTAL FE) 325 MG: 325 (65 FE) TAB at 14:28

## 2020-11-26 RX ADMIN — CLOSTRIDIUM TETANI TOXOID ANTIGEN (FORMALDEHYDE INACTIVATED), CORYNEBACTERIUM DIPHTHERIAE TOXOID ANTIGEN (FORMALDEHYDE INACTIVATED), BORDETELLA PERTUSSIS TOXOID ANTIGEN (GLUTARALDEHYDE INACTIVATED), BORDETELLA PERTUSSIS FILAMENTOUS HEMAGGLUTININ ANTIGEN (FORMALDEHYDE INACTIVATED), BORDETELLA PERTUSSIS PERTACTIN ANTIGEN, AND BORDETELLA PERTUSSIS FIMBRIAE 2/3 ANTIGEN 0.5 ML: 5; 2; 2.5; 5; 3; 5 INJECTION, SUSPENSION INTRAMUSCULAR at 17:24

## 2020-11-26 RX ADMIN — FUROSEMIDE 40 MG: 40 TABLET ORAL at 14:29

## 2020-11-26 RX ADMIN — HEPARIN SODIUM 22 UNITS/KG/HR: 5000 INJECTION, SOLUTION INTRAVENOUS at 15:41

## 2020-11-26 RX ADMIN — IPRATROPIUM BROMIDE AND ALBUTEROL SULFATE 3 ML: 2.5; .5 SOLUTION RESPIRATORY (INHALATION) at 13:51

## 2020-11-26 RX ADMIN — IPRATROPIUM BROMIDE AND ALBUTEROL SULFATE 3 ML: 2.5; .5 SOLUTION RESPIRATORY (INHALATION) at 20:52

## 2020-11-26 RX ADMIN — OXYCODONE HYDROCHLORIDE 10 MG: 10 TABLET ORAL at 04:13

## 2020-11-26 ASSESSMENT — CHA2DS2 SCORE
DIABETES: YES
AGE 75 OR GREATER: NO
CHF OR LEFT VENTRICULAR DYSFUNCTION: YES
VASCULAR DISEASE: YES
SEX: MALE
PRIOR STROKE OR TIA OR THROMBOEMBOLISM: YES
HYPERTENSION: YES
CHA2DS2 VASC SCORE: 6
AGE 65 TO 74: NO

## 2020-11-26 ASSESSMENT — ENCOUNTER SYMPTOMS
CHILLS: 0
DOUBLE VISION: 0
SINUS PAIN: 0
FEVER: 0
NAUSEA: 0
NERVOUS/ANXIOUS: 0
PALPITATIONS: 0
CONSTIPATION: 0
DIARRHEA: 0
ABDOMINAL PAIN: 0
DIZZINESS: 1
VOMITING: 0
SHORTNESS OF BREATH: 1
COUGH: 1
BLURRED VISION: 0
SPUTUM PRODUCTION: 0
FOCAL WEAKNESS: 0
HEADACHES: 0
MYALGIAS: 0
SORE THROAT: 0
WHEEZING: 1
WEAKNESS: 1

## 2020-11-26 ASSESSMENT — COGNITIVE AND FUNCTIONAL STATUS - GENERAL
DAILY ACTIVITIY SCORE: 18
TURNING FROM BACK TO SIDE WHILE IN FLAT BAD: A LOT
DRESSING REGULAR LOWER BODY CLOTHING: A LITTLE
SUGGESTED CMS G CODE MODIFIER MOBILITY: CL
MOVING TO AND FROM BED TO CHAIR: A LITTLE
STANDING UP FROM CHAIR USING ARMS: A LOT
SUGGESTED CMS G CODE MODIFIER DAILY ACTIVITY: CK
WALKING IN HOSPITAL ROOM: TOTAL
MOVING FROM LYING ON BACK TO SITTING ON SIDE OF FLAT BED: UNABLE
TOILETING: A LITTLE
HELP NEEDED FOR BATHING: A LOT
PERSONAL GROOMING: A LITTLE
MOBILITY SCORE: 10
DRESSING REGULAR UPPER BODY CLOTHING: A LITTLE
CLIMB 3 TO 5 STEPS WITH RAILING: TOTAL

## 2020-11-26 ASSESSMENT — PAIN DESCRIPTION - PAIN TYPE
TYPE: SURGICAL PAIN

## 2020-11-26 NOTE — ANESTHESIA TIME REPORT
Anesthesia Start and Stop Event Times     Date Time Event    11/26/2020 0946 Ready for Procedure     0949 Anesthesia Start     1020 Anesthesia Stop        Responsible Staff  11/26/20    Name Role Begin End    Kalia Lechuga M.D. Anesth 0949 1020        Preop Diagnosis (Free Text):  Pre-op Diagnosis     LEFT AKA WOUND INFECTION        Preop Diagnosis (Codes):    Post op Diagnosis  History of incision and drainage      Premium Reason  F. Holiday    Comments:

## 2020-11-26 NOTE — ANESTHESIA PREPROCEDURE EVALUATION
Relevant Problems   ANESTHESIA   (+) SHASHANK (obstructive sleep apnea)      PULMONARY   (+) COPD (chronic obstructive pulmonary disease) ([facility])   (+) Pneumonia due to infectious organism   (+) Shortness of breath      NEURO   (+) History of stroke      CARDIAC   (+) Acute exacerbation of CHF (congestive heart failure) ([facility])   (+) CAD (coronary artery disease)   (+) Essential hypertension   (+) NSTEMI (non-ST elevated myocardial infarction) ([facility])   (+) Paroxysmal atrial fibrillation ([facility])   (+) Pulmonary HTN ([facility])         (+) CKD (chronic kidney disease), stage III      ENDO   (+) Type 2 diabetes mellitus with complication, without long-term current use of insulin ([facility])       Physical Exam    Airway   Mallampati: II  TM distance: >3 FB  Neck ROM: full       Cardiovascular - normal exam  Rhythm: regular  Rate: normal  (-) murmur     Dental - normal exam           Pulmonary - normal exam  Breath sounds clear to auscultation     Abdominal    Neurological - normal exam                 Anesthesia Plan    ASA 3   ASA physical status 3 criteria: diabetes - poorly controlled    Plan - general       Airway plan will be LMA        Induction: intravenous      Pertinent diagnostic labs and testing reviewed    Informed Consent:    Anesthetic plan and risks discussed with patient.

## 2020-11-26 NOTE — PROGRESS NOTES
Pt arrived to floor from CT scan, pt was unable to tolerate procedure due to anxiety and traumatic past experience. Pt on 3L NC. Heparin drip verified, infusing at 22 units/kg/hr. Pt resting comfortably in bed.

## 2020-11-26 NOTE — ANESTHESIA POSTPROCEDURE EVALUATION
Patient: Joshua Medrano    Procedure Summary     Date: 11/26/20 Room / Location: [place] [address] / SURGERY [facility]    Anesthesia Start: 0949 Anesthesia Stop: 1020    Procedure: IRRIGATION AND DEBRIDEMENT, WOUND - THIGH (AKA) WITH VAC DRESSING (Left Leg Upper) Diagnosis: (LEFT AKA WOUND INFECTION)    Surgeons: Lambert Aguilar M.D. Responsible Provider: Kalia Lechuga M.D.    Anesthesia Type: general ASA Status: 3          Final Anesthesia Type: general  Last vitals  BP   Blood Pressure: 119/83    Temp   36.7 °C (98.1 °F)    Pulse   Pulse: 77   Resp   19    SpO2   94 %      Anesthesia Post Evaluation    Patient location during evaluation: PACU  Patient participation: complete - patient participated  Level of consciousness: awake and alert    Airway patency: patent  Anesthetic complications: no  Cardiovascular status: hemodynamically stable  Respiratory status: acceptable  Hydration status: euvolemic    PONV: none           Nurse Pain Score: 9 (NPRS)

## 2020-11-26 NOTE — ANESTHESIA PROCEDURE NOTES
Airway    Date/Time: 11/26/2020 9:55 AM  Performed by: Kalia Lechuga M.D.  Authorized by: Kalia Lechuga M.D.     Location:  OR  Urgency:  Elective  Indications for Airway Management:  Anesthesia      Spontaneous Ventilation: absent    Sedation Level:  Deep  Preoxygenated: Yes    Final Airway Type:  Supraglottic airway  Final Supraglottic Airway:  Standard LMA    SGA Size:  4  Number of Attempts at Approach:  1

## 2020-11-26 NOTE — PROGRESS NOTES
"2 RN skin check completed with Angella COPELAND.  Devices in place: silicone oxygen tubing, PIV x2.  Skin assessed under devices: intact. Redness around left upper arm, pt states, \"from blood pressure cuff.\"  Confirmed skin issues found on sacral/dottie area--blanchable erythema, IAD. Moistness under pannus/groin area, ears red and blanching, elbows intact, mild bruising and scabbing to BUE. Left AKA/stump with incision open to air with stitches in place, blanchable erythema to site. Open sore/wound on anterior/lateral left thigh, small amount of brown drainage on dressing, removed--now open to air. RLE dusky, flaky, peeling, scabbed, dry, and calloused.      The following interventions in place: low air loss mattress, q2 turn with TAPS in place; barrier wipes, chucks, and interdry ordered, barrier paste in use.  "

## 2020-11-26 NOTE — CONSULTS
DATE OF SERVICE:  11/25/2020    INFECTIOUS DISEASE CONSULTATION    REASON FOR CONSULTATION:  Infected AKA stump.    CONSULTING PHYSICIAN:  Dr. Carlos Casillas.    HISTORY OF PRESENT ILLNESS:  This is a 54-year-old male with known atrial   fibrillation, coronary artery disease, peripheral vascular disease, who was   admitted as a transfer on 11/24/2020 from [facility], where he was   admitted from 11/20 to 11/23 for CHF exacerbation.  He had complaints at that   time of increasing shortness of breath.  His COVID was negative.  His urine   toxicology was positive for methamphetamine.  His chest x-ray showed bilateral   infiltrates.  His echocardiogram showed preserved LV function with ejection   fraction of 50%.  He was noted during admission to have a poorly functioning   wound VAC and purulent drainage reportedly from the wound and when I examined   them, it was more from his lateral stump.  He was transferred to   [facility] for orthopedic evaluation as the above-the-knee amputation was   performed here on 11/12/2020.  Unfortunately, he left the hospital against   medical advice.  During his hospitalization at [facility] earlier this month, he was seen by infectious   Disease. Cultures at that time   were group A strep and methicillin-sensitive Staph aureus.  Again, he left [place]   with a wound VAC in place, unclear how it was being managed in the interim   between 11/16 and 11/20, when he was admitted to the outside facility.    Records were reviewed.  He was placed on vancomycin and Zosyn there.  Attempt   to collect cultures was noted by the attending, however, there were no culture   results in any of the lab results or records that were sent.  In the ED, he   was on 3 L nasal cannula.  Initially, there was some reported dyspnea;   however, that has since resolved.  He is currently receiving vancomycin and   Zosyn and infectious disease is consulted for antibiotic recommendations and   management.    REVIEW OF  SYSTEMS:  Positive for intermittent nonproductive cough, lower   extremity swelling.  All other systems are reviewed and are negative.  Patient   is currently eating lunch amd in no respiratory distress.      PAST MEDICAL HISTORY:  Asthma, atrial fibrillation, coronary artery disease,   COPD, congestive heart failure, diabetes.  He has had a CABG and a left   above-the-knee guillotine amputation on 11/04/2020 and then above-the-knee   amputation on 11/12/2020.    FAMILY HISTORY:  Diabetes, leukemia, lung disease.    ALLERGIES:  REPORTS ALLERGIC TO PENICILLIN; however, he is tolerating the   Zosyn and he has tolerated cephalosporins.  HE IS INTOLERANT TO BETA-BLOCKERS.    SOCIAL HISTORY:  He is a former smoker.  Denied illegal drugs; however, his   urine tox screen was positive for methamphetamine.    PHYSICAL EXAMINATION:  GENERAL:  He is a disheveled appearing male, in no acute distress, looks older   than his stated age.  VITAL SIGNS:  He has been afebrile since admission, temperature 97.6, blood   pressure 111/59, pulse of 80, respiratory rate 17, oxygen saturation 92% on 1   L nasal cannula.  He is 6 feet 5 inches, weighs 160 kg, even with just a one   leg, BMI of 42.  HEENT:  Normocephalic, atraumatic.  Pupils equal, round, reactive to light.    Extraocular movements intact.  He has anicteric sclerae.  Oropharynx is clear.    He is edentulous.  NECK:  Supple.  There is no JVD or stridor.  CARDIOVASCULAR:  Regular rate and rhythm.  Heart sounds are distant.  CHEST:  Grossly clear to auscultation bilaterally, unlabored.  There is no   wheezing or rhonchi at this time and again respirations are unlabored.  ABDOMEN:  Obese, soft, nontender, nondistended.  There is no rebound or   guarding.  EXTREMITIES:  Show right lower extremity edema with scaling and chronic stasis   changes, onychomycosis.  Limited exam due to patient cooperation; however,   has purulent drainage from the left lateral stump as well as serous  appearing   drainage from his surgical tape.  NEUROLOGIC:  He is awake, alert, oriented.  Speech is fluent without   dysarthria.  Cranial nerves are intact.  He is moving all extremities.    LABORATORY DATA:  Current labs show white blood cell count of 9.7, H and H of   9 and 31.3, platelets of 270.  Sodium 134, potassium 4, chloride 100,   bicarbonate 30, glucose 129, BUN 13, creatinine 0.72, AST 17, ALT 12, alkaline   phosphatase 91, albumin of 2.8.  COVID was negative on 10/30, 11/12, and   11/25.  Culture from his lower extremity on 10/30/2020 showed   methicillin-sensitive Staph aureus and group A strep.  Blood culture was   positive on 10/29.  Chest x-ray showed bilateral interstitial infiltrates   consistent with pulmonary edema.  EKG shows QTC of 484.    ASSESSMENT AND PLAN:  A 54-year-old male with recurrent chronic obstructive   pulmonary disease, coronary artery disease, congestive heart failure, admitted   to the hospital with surgical above-the-knee amputation site infection,   likely directly attributable to him leaving [place], and with a resultant   malfunctioning wound VAC.  Prior cultures were group A strep and   methicillin-sensitive Staph aureus.  He has a risk for those as well as other   nosocomial pathogens, so do agree with continuing broad-spectrum antimicrobial   coverage.  However, would switch vancomycin to Zyvox to minimize risks of   nephrotoxicity and continue the Zosyn as he is showing no evidence of   PENICILLIN ALLERGY, I do recommend removing it from his allergy list.  He was   last seen by infectious disease on 11/16/2020.  He did complete his course of   IV Ancef for his group A strep sepsis.  Unfortunately, he is now presenting   with infection of his surgical site again related to noncompliance with wound   care and medical care.  His tetanus was supposed to have been updated on his   last admission; however, does not appear that it was done, so we will do so   currently.  If  possible, rylee the outside hospital in [place] to obtain   microbiology results of cultures were done, but again the actual collection of   cultures was not documented.  He does have ongoing drug use.  Urine drugs of   abuse was positive for meth, so do not place PICC line.  He is planned for CT   scan to assess extent of the infection of his surgical site.  If there is no   osteomyelitis and his blood cultures remain negative, anticipate switching to   oral therapy.  Further recommendations per culture results and clinical   course.    Thank you and we will follow with you.       ____________________________________     MD STEPHANIE JAMA / NTS    DD:  11/25/2020 17:47:20  DT:  11/25/2020 22:23:56    D#:  [id number]  Job#:  [id number]

## 2020-11-26 NOTE — CONSULTS
"DATE OF SERVICE:  11/25/2020    REQUESTING PHYSICIAN:  1.  Luz Kan MD  2.  Johnathan Durbin MD    CHIEF COMPLAINT:  Wound drainage.    HISTORY OF PRESENT ILLNESS:  The patient is 54 years old.  He was transferred   from [place], [place].  He had had a previous AKA by Dr. Durbin about 3-4   weeks ago.  His surgical incision apparently is healing, but there is drainage   from a lateral wound.  This was noted to be \"pus.\"  Apparently nobody in   [place] could take care of his problem, so he was sent from [place], [place] to [place] for additional treatment.  The   patient has no fevers.    ALLERGIES:  PENICILLIN, ERYTHROMYCIN, SHELLFISH, METOPROLOL.    MEDICATIONS:  Crestor, Aldactone, Spiriva. Coumadin, K-Dur, metoprolol,   lisinopril, DuoNeb, Lasix, iron sulfate, amiodarone, albuterol.    PAST MEDICAL HISTORY:  Atrial fibrillation, coronary artery disease, COPD,   CHF, diabetes mellitus, asthma.    PAST SURGICAL HISTORY:  Abdominal surgery, coronary artery bypass graft 3   years ago, left below-knee and subsequent above-knee amputation.    SOCIAL HISTORY:  The patient lives in [place], [place].  He does not smoke   anymore, although has a history of smoking.  Denies any drug use or alcohol.    FAMILY HISTORY:  Positive for alcohol and drug use in brother, diabetes in   multiple relatives, including both parents, heart disease in brother, leukemia   in mother, lung disease in brother.    REVIEW OF SYSTEMS:  No loss of consciousness, nausea, vomiting, diarrhea,   constipation, polyuria, dysuria, fevers, chills, weight loss, weight gain,   abdominal pain, chest pain.  He does have shortness of breath, cough, sputum   production, wheezing, abdominal pain, dizziness.    PHYSICAL EXAMINATION:  GENERAL:  The patient is in no acute distress, sitting on his bed.  VITAL SIGNS:  His blood pressure is 111/59, heart rate 71, respirations 16,   temperature 97.6.  HEENT:  Normocephalic, " atraumatic.  NECK:  Supple, nontender.  CHEST AND ABDOMEN:  Nontender.  ABDOMEN:  Obese.  EXTREMITIES:  The right lower and bilateral upper extremities without   tenderness or deformity.  Left lower extremity shows a healing AKA incision.    Lateral to this a little bit proximally, there is a small wound that is 1-2 mm   in diameter that is draining serous fluid.  There is no gross fluctuance.    LABORATORY DATA:  Labs include white blood cell count of 9700, hematocrit   31.3%, platelet count 270,000.  Sodium 134, potassium 4.0, creatinine 0.72,   AST and ALT are normal.  Albumin 2.8.  INR 1.84.    No radiographs.    ASSESSMENT:  Wound drainage, left above-the-knee amputation.    PLAN:  The patient is on a heparin drip.  He can continue on this.  I will get   a CT scan of the left thigh to evaluate for persistent fluid collection.    Possibly take the patient to the operating room tomorrow for removal of the   sutures with a separate incision to drain any fluid collection at the site of   the wound drainage.  He may also require a wound VAC.       ____________________________________     MD JESS GOMEZ / YOSVANY    DD:  11/25/2020 17:06:08  DT:  11/25/2020 22:23:52    D#:  [id number]  Job#:  [id number]

## 2020-11-26 NOTE — PROGRESS NOTES
Updated [facility] team of patient inability to tolerate CT scan. New orders to give 1 mg IV morphine prior to CT. RT at bedside now giving breathing treatment. Left voicemail with orthopedic surgeon to return my call, per nursing communication.

## 2020-11-26 NOTE — PROGRESS NOTES
Pt is A&O.  Emotional this am.  Anxious too.  SOB.  Getting routine resp txs.  Pt wants to be awakened for txs.  Tolerated I&D to L AKA.  Pt returned with wound vac on,  Very little o/p from that thus far.  L stump elevated on pillows. Heparin gtt restarted per orders.  Awaiting coumadin tonight.  Using urinal to void.

## 2020-11-26 NOTE — PROGRESS NOTES
Pt back in room from CT, able to tolerate procedure. On 3L NC. Resting in bed, no signs of acute distress at this moment.

## 2020-11-26 NOTE — PROGRESS NOTES
[facility] Medicine Daily Progress Note    Date of Service  11/26/2020    Chief Complaint  54 y.o. male admitted 11/24/2020 with left AKA wound infection     Hospital Course  A 54 y.o. male with PMH of CAD, DM, COPD, CHF, and A-fib who had recent left AKA on 11/12 who left AMA from hospital, admitted on 11/24/20 as a transfer from [place], [place] for left AKA wound infection and shortness of breath secondary to CHF. ID & Ortho on board. On cefazolin 2g IV Q8H based on prior sensitivities. Ortho I & D left AKA wound on 11/26.     Interval Problem Update  Patient was seen and examined at bedside after orthopedic procedure.  Wound VAC placed left lower extremity. The patient complained of being uncomfortable in the bariatric bed.  Bedside RN educates the patient the appropriate use of bariatric bed.  Patient complained of anxiety especially at night, started Seroquel daily and Xanax as needed.   Re-started heparin drip after orthopedic procedure and will bridge with warfarin tonight.     Consultants/Specialty  ID   Infectious disease    Code Status  Full Code    Disposition  TBD    Review of Systems  Review of Systems   Constitutional: Negative for chills, fever and malaise/fatigue.   HENT: Negative for congestion, ear discharge, ear pain, sinus pain and sore throat.    Eyes: Negative for blurred vision and double vision.   Respiratory: Positive for cough, shortness of breath and wheezing. Negative for sputum production.    Cardiovascular: Positive for leg swelling. Negative for chest pain and palpitations.   Gastrointestinal: Negative for abdominal pain, constipation, diarrhea, nausea and vomiting.   Genitourinary: Negative for dysuria, frequency and urgency.   Musculoskeletal: Positive for joint pain. Negative for myalgias.   Neurological: Positive for dizziness and weakness. Negative for focal weakness and headaches.   Psychiatric/Behavioral: The patient is not nervous/anxious.         Physical Exam  Temp:  [36.2 °C  (97.2 °F)-36.9 °C (98.4 °F)] 36.7 °C (98.1 °F)  Pulse:  [66-82] 73  Resp:  [14-20] 18  BP: (101-133)/(55-83) 108/69  SpO2:  [92 %-99 %] 93 %    Physical Exam  Constitutional:       General: He is not in acute distress.     Appearance: He is obese.   HENT:      Head: Normocephalic and atraumatic.      Nose: Nose normal.      Mouth/Throat:      Mouth: Mucous membranes are moist.      Pharynx: No posterior oropharyngeal erythema.   Eyes:      General: No scleral icterus.     Extraocular Movements: Extraocular movements intact.      Conjunctiva/sclera: Conjunctivae normal.      Pupils: Pupils are equal, round, and reactive to light.   Neck:      Musculoskeletal: Normal range of motion and neck supple. No neck rigidity.   Cardiovascular:      Rate and Rhythm: Normal rate. Rhythm irregular.      Pulses: Normal pulses.      Heart sounds: No murmur. No gallop.    Pulmonary:      Effort: Pulmonary effort is normal.      Breath sounds: Normal breath sounds. No stridor. No wheezing, rhonchi or rales.   Abdominal:      General: Bowel sounds are normal. There is distension.      Palpations: Abdomen is soft.      Tenderness: There is no abdominal tenderness. There is no guarding.   Musculoskeletal:         General: Swelling present. No tenderness.      Comments: Left AKA amputation  S/p I&D on left AKA wound, wound VAC in placed   Skin:     General: Skin is warm.      Comments: Chronic venous stasis changes on her right lower extremity   Neurological:      General: No focal deficit present.      Mental Status: He is alert and oriented to person, place, and time.   Psychiatric:         Mood and Affect: Mood normal.         Behavior: Behavior normal.         Fluids    Intake/Output Summary (Last 24 hours) at 11/26/2020 1409  Last data filed at 11/26/2020 1100  Gross per 24 hour   Intake 490 ml   Output 600 ml   Net -110 ml       Laboratory  Recent Labs     11/25/20  0059   WBC 9.7   RBC 3.14*   HEMOGLOBIN 9.0*   HEMATOCRIT 31.3*    MCV 99.7*   MCH 28.7   MCHC 28.8*   RDW 71.0*   PLATELETCT 270   MPV 10.0     Recent Labs     11/25/20  0059   SODIUM 134*   POTASSIUM 4.0   CHLORIDE 100   CO2 30   GLUCOSE 129*   BUN 13   CREATININE 0.72   CALCIUM 8.7     Recent Labs     11/24/20  2331 11/25/20  0133 11/25/20  0558   APTT  --  43.1*  --    INR 2.01* 1.97* 1.84*               Imaging  CT-EXTREMITY, LOWER WITH LEFT   Final Result      Fluid collection along the distal posterior aspect of the above the knee amputation measuring 7.8 x 2.3 x 5.3 cm without significant peripheral enhancement to suggest abscess.      Extensive left thigh subcutaneous edema.      CT-ABDOMEN WITH & W/O   Final Result      Resolution of cystic pancreas lesion most consistent with pancreatic pseudocyst.      Cirrhotic appearing liver with splenomegaly.      Nonobstructive right renal stone.      Possible gallstones            US-EXTREMITY ARTERY LOWER UNILAT LEFT   Final Result      US-EXTREMITY VENOUS LOWER BILAT   Final Result      DX-CHEST-LIMITED (1 VIEW)   Final Result      Moderate bilateral interstitial opacities which likely represents pulmonary edema.      Stable cardiac silhouette enlargement.           Assessment/Plan  * Soft tissue infection  Assessment & Plan  s/p initial left intitial knee amputation on 11/2/20 with Dr. Durbin, followed by completion of left AKA on 11/12.  Patient left [facility] AMA on 11/17  Presented to [facility] on 11/20 with purulent drainage from wound vac insertion site, with wound vac removal at outside hospital.   - Infectious Disease and orthopedic Sx on board  - on cefazolin 2g IV Q8H based on prior sensitivities  -I&D by orthopedic Sx on 11/26, wound VAC placed  -Pain control      Paroxysmal atrial fibrillation (HCC)- (present on admission)  Assessment & Plan   Continue Amiodarone 200mg QAM & metoprolol   [id number] =3    INR 2.01 on 11/24  Restarted heparin drip after the orthopedic procedure and bridge with warfarin tonight      COPD  (chronic obstructive pulmonary disease) (HCC)- (present on admission)  Assessment & Plan  - COPD exacerbation on admission  - PFTS from 2018 revealed FVC 37% predicted.  FEV1 35% predicted.  No   significant response to bronchodilators.  FEV1/FVC is 73. Total lung capacity 5.29,     Diffusion DLCO 41% predicted indicating mixed obstructive and restrictive disease  - wheezing on exam and c/o shortness of breath  -Chest x-ray showed pulmonary edema  -Oxygen per protocol  -RT protocol with inhalers  - acapella treatment  - will avoid systemic steroids at this time due to active soft tissue infection      Pulmonary HTN ([facility])  Assessment & Plan  Estimated right ventricular systolic pressure  is 40 mmHg on echo on 11/2  - likely secondary to CHF, COPD and SHASHANK  - continue to managemCOPD and CHF exacerbation      Acute exacerbation of CHF (congestive heart failure) ([facility])- (present on admission)  Assessment & Plan  PMH of CAD and NSTEMI  Patient [facility] admitted to [facility] found to have CHF exacerbation  - Last echo on 11/2  notable for LVEF 50%, Mild aortic stenosis,   Toprol 25mg QAM  Rosuvastatin 40mg daily  Lasix 40mg daily  Aldactone 25mg daily  Daily weights and I/Os    Type 2 diabetes mellitus with complication, without long-term current use of insulin ([facility])- (present on admission)  Assessment & Plan  - A1c 7.8 on 10/30/2020  - ISS with hypoglycemia protocol  - DM diet    SHASHANK (obstructive sleep apnea)- (present on admission)  Assessment & Plan  CPAP per RT    CKD (chronic kidney disease), stage III- (present on admission)  Assessment & Plan  - continue to monitor  - Creatinine at baseline at 0.69    Essential hypertension- (present on admission)  Assessment & Plan  Continue home medications with holding parameters       VTE prophylaxis: SCDs & Heparin Drip for A. fib anticoagulation

## 2020-11-26 NOTE — OP REPORT
DATE OF SERVICE:  11/26/2020    PREOPERATIVE DIAGNOSES:  1.  Deep seroma/hematoma, left above-the-knee amputation -- possibly infected.  2.  Left above-the-knee amputation.  3.  Peripheral vascular disease.  4.  Morbid obesity.    POSTOPERATIVE DIAGNOSES:  1.  Deep seroma/hematoma, left above-the-knee amputation -- possibly infected.  2.  Left above-the-knee amputation.  3.  Peripheral vascular disease.  4.  Morbid obesity.    SURGICAL PROCEDURES:  1.  Removal of suture, left above-the-knee amputation.  2.  Incision and drainage of left thigh fluid collection.  3.  Application of wound VAC dressing to left thigh.    SURGEON:  Lambert Aguilar MD    ASSISTANT:  Austyn Jarrell DO    ANESTHESIOLOGIST:  Kalia Lechuga MD    ANESTHETIC:  General.    ESTIMATED BLOOD LOSS:  10 mL    INDICATIONS:  The patient is 54 years old.  He had a left transfemoral   amputation, approximately 3-1/2 weeks ago by Dr. Durbin.  He went back to [place]   where he lives, which is about 5 hours away.  He apparently had a VAC dressing   over the wound and has had some drainage from the site.  There was nobody   available in [place], who was apparently willing to take care of   him, so he was transferred back to [place], [place] for treatment.  He is not   septic.  There is a small Hemovac site laterally, which has some surrounding   erythema and tenderness.  There is serous drainage from this.  A CT scan shows   a deep fluid collection at the end of the residual femur.  I recommended   removal of sutures in the operating room in addition to incision and drainage   of the hematoma/seroma with deep cultures, and then antibiotics as indicated.    DESCRIPTION OF PROCEDURE:  The  patient was identified in the preoperative   holding area.  His left residual leg was marked.  He was taken to the   operating room.  He was left supine on his bed.  General anesthesia was   administered.  The left lower extremity was then prepped and draped in  sterile   fashion.  Time-out was held to confirm the patient identity and correct   surgical site.    We removed the sutures from the end of the amputation site.  We then made a   separate longitudinal incision centered around the sinus tract.  Dissected   down to the fascia and then entered a deep space at the distal posterior   aspect of the residual femur where some liquefying hematoma was encountered.    There was no pus.  This fluid was evacuated.  Some deep soft tissue adjacent   to the residual femur was removed and sent for tissue culture.  Wound was   swabbed and sent for additional culture.  The deep space was then irrigated   with sterile saline.  We then placed a VAC dressing sponge into the dead space   up to the surface, sealed this with Ioban, set the suction at 125 mmHg and   the seal was maintained.  The patient was left on his bed, extubated, and   taken to recovery room in stable condition.    POSTOPERATIVE PLAN:  1.  Intravenous antibiotics and follow cultures.  2.  Wound VAC therapy with anticipation of secondary wound healing.  3.  Ongoing preoperative medical management per hospitalist.       ____________________________________     MD JESS GOMEZ / YOSVNAY    DD:  11/26/2020 10:27:56  DT:  11/26/2020 10:58:25    D#:  [id number]  Job#:  [id number]

## 2020-11-27 LAB
ALBUMIN SERPL BCP-MCNC: 2.9 G/DL (ref 3.2–4.9)
ALBUMIN/GLOB SERPL: 0.6 G/DL
ALP SERPL-CCNC: 81 U/L (ref 30–99)
ALT SERPL-CCNC: <5 U/L (ref 2–50)
ANION GAP SERPL CALC-SCNC: 5 MMOL/L (ref 7–16)
AST SERPL-CCNC: 12 U/L (ref 12–45)
BASOPHILS # BLD AUTO: 0.5 % (ref 0–1.8)
BASOPHILS # BLD: 0.05 K/UL (ref 0–0.12)
BILIRUB SERPL-MCNC: 0.3 MG/DL (ref 0.1–1.5)
BUN SERPL-MCNC: 12 MG/DL (ref 8–22)
CALCIUM SERPL-MCNC: 8.8 MG/DL (ref 8.5–10.5)
CHLORIDE SERPL-SCNC: 102 MMOL/L (ref 96–112)
CO2 SERPL-SCNC: 31 MMOL/L (ref 20–33)
CREAT SERPL-MCNC: 0.95 MG/DL (ref 0.5–1.4)
EOSINOPHIL # BLD AUTO: 0.34 K/UL (ref 0–0.51)
EOSINOPHIL NFR BLD: 3.7 % (ref 0–6.9)
ERYTHROCYTE [DISTWIDTH] IN BLOOD BY AUTOMATED COUNT: 72.5 FL (ref 35.9–50)
GLOBULIN SER CALC-MCNC: 4.6 G/DL (ref 1.9–3.5)
GLUCOSE BLD-MCNC: 110 MG/DL (ref 65–99)
GLUCOSE BLD-MCNC: 126 MG/DL (ref 65–99)
GLUCOSE BLD-MCNC: 146 MG/DL (ref 65–99)
GLUCOSE BLD-MCNC: 148 MG/DL (ref 65–99)
GLUCOSE SERPL-MCNC: 110 MG/DL (ref 65–99)
HCT VFR BLD AUTO: 31.7 % (ref 42–52)
HGB BLD-MCNC: 9.2 G/DL (ref 14–18)
IMM GRANULOCYTES # BLD AUTO: 0.12 K/UL (ref 0–0.11)
IMM GRANULOCYTES NFR BLD AUTO: 1.3 % (ref 0–0.9)
INR PPP: 1.69 (ref 0.87–1.13)
LYMPHOCYTES # BLD AUTO: 0.7 K/UL (ref 1–4.8)
LYMPHOCYTES NFR BLD: 7.7 % (ref 22–41)
MCH RBC QN AUTO: 29.2 PG (ref 27–33)
MCHC RBC AUTO-ENTMCNC: 29 G/DL (ref 33.7–35.3)
MCV RBC AUTO: 100.6 FL (ref 81.4–97.8)
MONOCYTES # BLD AUTO: 0.65 K/UL (ref 0–0.85)
MONOCYTES NFR BLD AUTO: 7.1 % (ref 0–13.4)
NEUTROPHILS # BLD AUTO: 7.24 K/UL (ref 1.82–7.42)
NEUTROPHILS NFR BLD: 79.7 % (ref 44–72)
NRBC # BLD AUTO: 0 K/UL
NRBC BLD-RTO: 0 /100 WBC
PLATELET # BLD AUTO: 244 K/UL (ref 164–446)
PMV BLD AUTO: 9.9 FL (ref 9–12.9)
POTASSIUM SERPL-SCNC: 4 MMOL/L (ref 3.6–5.5)
PROT SERPL-MCNC: 7.5 G/DL (ref 6–8.2)
PROTHROMBIN TIME: 20.4 SEC (ref 12–14.6)
RBC # BLD AUTO: 3.15 M/UL (ref 4.7–6.1)
SODIUM SERPL-SCNC: 138 MMOL/L (ref 135–145)
UFH PPP CHRO-ACNC: 0.25 IU/ML
UFH PPP CHRO-ACNC: 0.42 IU/ML
UFH PPP CHRO-ACNC: <0.1 IU/ML
WBC # BLD AUTO: 9.1 K/UL (ref 4.8–10.8)

## 2020-11-27 PROCEDURE — 700102 HCHG RX REV CODE 250 W/ 637 OVERRIDE(OP): Performed by: STUDENT IN AN ORGANIZED HEALTH CARE EDUCATION/TRAINING PROGRAM

## 2020-11-27 PROCEDURE — 99232 SBSQ HOSP IP/OBS MODERATE 35: CPT | Performed by: STUDENT IN AN ORGANIZED HEALTH CARE EDUCATION/TRAINING PROGRAM

## 2020-11-27 PROCEDURE — 99232 SBSQ HOSP IP/OBS MODERATE 35: CPT | Performed by: INTERNAL MEDICINE

## 2020-11-27 PROCEDURE — 700111 HCHG RX REV CODE 636 W/ 250 OVERRIDE (IP): Performed by: STUDENT IN AN ORGANIZED HEALTH CARE EDUCATION/TRAINING PROGRAM

## 2020-11-27 PROCEDURE — A9270 NON-COVERED ITEM OR SERVICE: HCPCS | Performed by: STUDENT IN AN ORGANIZED HEALTH CARE EDUCATION/TRAINING PROGRAM

## 2020-11-27 PROCEDURE — 80053 COMPREHEN METABOLIC PANEL: CPT

## 2020-11-27 PROCEDURE — 94640 AIRWAY INHALATION TREATMENT: CPT

## 2020-11-27 PROCEDURE — 85025 COMPLETE CBC W/AUTO DIFF WBC: CPT

## 2020-11-27 PROCEDURE — 85610 PROTHROMBIN TIME: CPT

## 2020-11-27 PROCEDURE — 770004 HCHG ROOM/CARE - ONCOLOGY PRIVATE *

## 2020-11-27 PROCEDURE — 94760 N-INVAS EAR/PLS OXIMETRY 1: CPT

## 2020-11-27 PROCEDURE — 700101 HCHG RX REV CODE 250: Performed by: FAMILY MEDICINE

## 2020-11-27 PROCEDURE — 94669 MECHANICAL CHEST WALL OSCILL: CPT

## 2020-11-27 PROCEDURE — 82962 GLUCOSE BLOOD TEST: CPT

## 2020-11-27 PROCEDURE — 36415 COLL VENOUS BLD VENIPUNCTURE: CPT

## 2020-11-27 PROCEDURE — 85520 HEPARIN ASSAY: CPT

## 2020-11-27 RX ORDER — BUDESONIDE AND FORMOTEROL FUMARATE DIHYDRATE 80; 4.5 UG/1; UG/1
2 AEROSOL RESPIRATORY (INHALATION) 2 TIMES DAILY
Status: DISCONTINUED | OUTPATIENT
Start: 2020-11-27 | End: 2020-12-08 | Stop reason: HOSPADM

## 2020-11-27 RX ORDER — WARFARIN SODIUM 5 MG/1
5 TABLET ORAL
Status: DISCONTINUED | OUTPATIENT
Start: 2020-11-28 | End: 2020-12-01

## 2020-11-27 RX ORDER — WARFARIN SODIUM 7.5 MG/1
7.5 TABLET ORAL
Status: DISCONTINUED | OUTPATIENT
Start: 2020-11-29 | End: 2020-12-01

## 2020-11-27 RX ORDER — WARFARIN SODIUM 7.5 MG/1
7.5 TABLET ORAL
Status: COMPLETED | OUTPATIENT
Start: 2020-11-27 | End: 2020-11-27

## 2020-11-27 RX ADMIN — FUROSEMIDE 40 MG: 40 TABLET ORAL at 04:35

## 2020-11-27 RX ADMIN — BUDESONIDE AND FORMOTEROL FUMARATE DIHYDRATE 2 PUFF: 80; 4.5 AEROSOL RESPIRATORY (INHALATION) at 18:08

## 2020-11-27 RX ADMIN — CEFAZOLIN SODIUM 2 G: 2 INJECTION, SOLUTION INTRAVENOUS at 04:35

## 2020-11-27 RX ADMIN — IPRATROPIUM BROMIDE AND ALBUTEROL SULFATE 3 ML: 2.5; .5 SOLUTION RESPIRATORY (INHALATION) at 11:04

## 2020-11-27 RX ADMIN — WARFARIN SODIUM 7.5 MG: 7.5 TABLET ORAL at 18:01

## 2020-11-27 RX ADMIN — SPIRONOLACTONE 25 MG: 25 TABLET ORAL at 04:35

## 2020-11-27 RX ADMIN — DOCUSATE SODIUM 50 MG AND SENNOSIDES 8.6 MG 2 TABLET: 8.6; 5 TABLET, FILM COATED ORAL at 04:35

## 2020-11-27 RX ADMIN — IPRATROPIUM BROMIDE AND ALBUTEROL SULFATE 3 ML: 2.5; .5 SOLUTION RESPIRATORY (INHALATION) at 20:24

## 2020-11-27 RX ADMIN — DOCUSATE SODIUM 50 MG AND SENNOSIDES 8.6 MG 2 TABLET: 8.6; 5 TABLET, FILM COATED ORAL at 17:12

## 2020-11-27 RX ADMIN — HEPARIN SODIUM 26 UNITS/KG/HR: 5000 INJECTION, SOLUTION INTRAVENOUS at 12:58

## 2020-11-27 RX ADMIN — HEPARIN SODIUM 30 UNITS/KG/HR: 5000 INJECTION, SOLUTION INTRAVENOUS at 21:17

## 2020-11-27 RX ADMIN — AMIODARONE HYDROCHLORIDE 200 MG: 200 TABLET ORAL at 04:35

## 2020-11-27 RX ADMIN — CEFAZOLIN SODIUM 2 G: 2 INJECTION, SOLUTION INTRAVENOUS at 21:26

## 2020-11-27 RX ADMIN — IPRATROPIUM BROMIDE AND ALBUTEROL SULFATE 3 ML: 2.5; .5 SOLUTION RESPIRATORY (INHALATION) at 06:45

## 2020-11-27 RX ADMIN — HEPARIN SODIUM 24 UNITS/KG/HR: 5000 INJECTION, SOLUTION INTRAVENOUS at 02:24

## 2020-11-27 RX ADMIN — CEFAZOLIN SODIUM 2 G: 2 INJECTION, SOLUTION INTRAVENOUS at 13:35

## 2020-11-27 RX ADMIN — IPRATROPIUM BROMIDE AND ALBUTEROL SULFATE 3 ML: 2.5; .5 SOLUTION RESPIRATORY (INHALATION) at 14:25

## 2020-11-27 RX ADMIN — ACETAMINOPHEN 500 MG: 500 TABLET ORAL at 01:28

## 2020-11-27 RX ADMIN — OXYCODONE HYDROCHLORIDE 10 MG: 10 TABLET ORAL at 01:28

## 2020-11-27 RX ADMIN — ROSUVASTATIN CALCIUM 40 MG: 20 TABLET, FILM COATED ORAL at 04:35

## 2020-11-27 RX ADMIN — OXYCODONE HYDROCHLORIDE 10 MG: 10 TABLET ORAL at 14:56

## 2020-11-27 RX ADMIN — FERROUS SULFATE TAB 325 MG (65 MG ELEMENTAL FE) 325 MG: 325 (65 FE) TAB at 04:35

## 2020-11-27 RX ADMIN — QUETIAPINE FUMARATE 12.5 MG: 25 TABLET ORAL at 07:40

## 2020-11-27 RX ADMIN — OXYCODONE HYDROCHLORIDE 10 MG: 10 TABLET ORAL at 07:40

## 2020-11-27 ASSESSMENT — ENCOUNTER SYMPTOMS
PALPITATIONS: 0
BLURRED VISION: 0
MYALGIAS: 1
FOCAL WEAKNESS: 0
DOUBLE VISION: 0
HEADACHES: 0
SPUTUM PRODUCTION: 0
FEVER: 0
DIARRHEA: 0
WHEEZING: 1
CONSTIPATION: 0
NERVOUS/ANXIOUS: 0
VOMITING: 0
NAUSEA: 0
SHORTNESS OF BREATH: 1
COUGH: 1
DIZZINESS: 1
SORE THROAT: 0
MYALGIAS: 0
ABDOMINAL PAIN: 0
CHILLS: 0
WEAKNESS: 1
SINUS PAIN: 0

## 2020-11-27 ASSESSMENT — PAIN DESCRIPTION - PAIN TYPE
TYPE: ACUTE PAIN;SURGICAL PAIN
TYPE: ACUTE PAIN
TYPE: ACUTE PAIN;SURGICAL PAIN
TYPE: ACUTE PAIN

## 2020-11-27 NOTE — PROGRESS NOTES
Infectious Disease Progress Note    Author: Evgeny Hsieh M.D. Date & Time of service: 2020  8:22 AM    Chief Complaint:  AKA stump fluid collection    Interval History:   afebrile, white count 9.1, taken to the OR yesterday and underwent I&D, gross appearance of liquefied hematoma was noted, cultures obtained.    Labs Reviewed and Medications Reviewed.    Review of Systems:  Review of Systems   Constitutional: Positive for malaise/fatigue. Negative for chills and fever.   Gastrointestinal: Negative for abdominal pain, diarrhea, nausea and vomiting.   Musculoskeletal: Positive for myalgias.   Skin: Negative for itching and rash.   All other systems reviewed and are negative.      Hemodynamics:  Temp (24hrs), Av.6 °C (97.9 °F), Min:36.1 °C (97 °F), Max:36.8 °C (98.2 °F)  Temperature: 36.7 °C (98 °F)  Pulse  Av.8  Min: 66  Max: 88   Blood Pressure: 110/60       Physical Exam:  Physical Exam  Vitals signs and nursing note reviewed.   Constitutional:       Comments: Appears somewhat disheveled   HENT:      Head: Normocephalic and atraumatic.      Mouth/Throat:      Mouth: Mucous membranes are moist.   Eyes:      General: No scleral icterus.        Right eye: No discharge.         Left eye: No discharge.      Conjunctiva/sclera: Conjunctivae normal.   Cardiovascular:      Rate and Rhythm: Normal rate and regular rhythm.      Heart sounds: No murmur.   Pulmonary:      Effort: Pulmonary effort is normal. No respiratory distress.      Breath sounds: No wheezing.   Abdominal:      Tenderness: There is no abdominal tenderness.      Comments: Protuberant   Musculoskeletal:      Comments: Left AKA stump with wound VAC in place, surrounding swelling but no erythema or active discharge noted   Skin:     Findings: No erythema or rash.      Comments: Multiple tattoos   Neurological:      General: No focal deficit present.      Mental Status: He is alert and oriented to person, place, and time.   Psychiatric:          Mood and Affect: Mood normal.         Behavior: Behavior normal.         Meds:    Current Facility-Administered Medications:   •  budesonide-formoterol  •  ALPRAZolam  •  QUEtiapine  •  MD Alert...Warfarin per Pharmacy  •  heparin  •  insulin regular **AND** POC Blood Glucose **AND** NOTIFY MD and PharmD **AND** glucose **AND** dextrose 50%  •  acetaminophen  •  oxyCODONE immediate-release **OR** oxyCODONE immediate-release  •  pneumococcal 13-Paradise Conj Vacc  •  influenza vaccine quad  •  ceFAZolin  •  ipratropium-albuterol  •  amiodarone  •  ferrous sulfate  •  furosemide  •  metoprolol SR  •  rosuvastatin  •  spironolactone  •  senna-docusate **AND** polyethylene glycol/lytes **AND** magnesium hydroxide **AND** bisacodyl  •  Respiratory Therapy Consult    Labs:  Recent Labs     11/25/20 0059 11/27/20 0417   WBC 9.7 9.1   RBC 3.14* 3.15*   HEMOGLOBIN 9.0* 9.2*   HEMATOCRIT 31.3* 31.7*   MCV 99.7* 100.6*   MCH 28.7 29.2   RDW 71.0* 72.5*   PLATELETCT 270 244   MPV 10.0 9.9   NEUTSPOLYS 83.40* 79.70*   LYMPHOCYTES 7.00* 7.70*   MONOCYTES 4.90 7.10   EOSINOPHILS 3.50 3.70   BASOPHILS 0.50 0.50   RBCMORPHOLO Present  --      Recent Labs     11/25/20 0059 11/27/20 0417   SODIUM 134* 138   POTASSIUM 4.0 4.0   CHLORIDE 100 102   CO2 30 31   GLUCOSE 129* 110*   BUN 13 12     Recent Labs     11/25/20 0059 11/27/20 0417   ALBUMIN 2.8* 2.9*   TBILIRUBIN 0.2 0.3   ALKPHOSPHAT 91 81   TOTPROTEIN 7.4 7.5   ALTSGPT 12 <5   ASTSGOT 17 12   CREATININE 0.72 0.95       Imaging:  Ct-abdomen With & W/o    Result Date: 11/25/2020 11/25/2020 9:55 PM HISTORY/REASON FOR EXAM:  History of pancreas lesion. TECHNIQUE/EXAM DESCRIPTION:  CT pancreas and abdomen without and with contrast. Initial precontrast thick-section images were obtained through the pancreas.  Following this, nonionic contrast was administered by IV, and thin-section helical scanning performed from the diaphragmatic domes through the iliac crests.  Pancreatic  parenchymal phase and portal venous phase (dual-phase) images were obtained following contrast administration. 100 mL of Omnipaque 350 nonionic contrast was administered. Low dose optimization technique was utilized for this CT exam including automated exposure control and adjustment of the mA and/or kV according to patient size. COMPARISON: September 12, 2019 FINDINGS: There is bibasilar atelectasis with apparent trace right pleural fluid. There are old right rib fractures. There are coronary artery calcifications. The heart is enlarged. Initial noncontrast images reveal possible subtle gallstones dependently in the gallbladder. No pancreas calcifications identified. There is calcified plaque in the aorta and branch vessels. There is a nonobstructive right renal stone measuring 4 mm. With the administration of contrast the pancreas enhances homogeneously. The previously noted cystic structure has resolved most consistent with a pancreas pseudocyst. The pancreas is somewhat atrophic. The liver is heterogeneous with a nodular surface suggestive of cirrhosis. The spleen is enlarged measuring 19.6 cm anterior posteriorly. The main portal vein, splenic vein and superior mesenteric vein appear patent. There is a stable left adrenal gland nodule. The right adrenal gland is unremarkable. The kidneys enhance symmetrically. No hydronephrosis. No ascites or lymphadenopathy. The visualized bowel is nondilated. There is partial visualization of a ventral hernia containing fat.     Resolution of cystic pancreas lesion most consistent with pancreatic pseudocyst. Cirrhotic appearing liver with splenomegaly. Nonobstructive right renal stone. Possible gallstones     Ct-extremity, Lower W/o Right    Result Date: 10/31/2020  10/31/2020 3:13 PM HISTORY/REASON FOR EXAM:  Lower leg swelling/redness, cellulitis suspected. TECHNIQUE/EXAM DESCRIPTION AND NUMBER OF VIEWS: CT scan of the RIGHT lower extremity without contrast and including  reconstructions. Thin-section noncontrast helical images were obtained. Coronal and sagittal reconstructions were generated from the axial images. Up to date radiation dose reduction adjustments have been utilized to meet ALARA standards for radiation dose reduction. COMPARISON: X-ray tibia and fibula 10/29/2020 FINDINGS: There is joint space narrowing with sclerosis and osteophyte formation in the lateral compartment of the knee. No definite bony destruction is identified. There is marked edema in the subcutaneous tissues of the right lower leg with slight thickening of the overlying skin. There are varicosities. There is arterial calcification. No well-defined fluid collection is identified to suggest abscess. There is diffuse muscle atrophy.     1.  No discrete fluid collection is identified to suggest abscess. 2.  Diffuse subcutaneous edema and overlying skin thickening with venous varicosities. 3.  Atherosclerosis. 4.  Diffuse muscle atrophy.    Ct-extremity, Lower W/o Left    Result Date: 10/31/2020  10/31/2020 2:58 PM HISTORY/REASON FOR EXAM:  Lower leg swelling/redness, cellulitis suspected; Bilateral. TECHNIQUE/EXAM DESCRIPTION AND NUMBER OF VIEWS: CT scan of the LEFT lower extremity without contrast and including reconstructions. Thin-section noncontrast helical images were obtained. Coronal and sagittal reconstructions were generated from the axial images. Up to date radiation dose reduction adjustments have been utilized to meet ALARA standards for radiation dose reduction. COMPARISON: None. FINDINGS: Distal femur appears normal There is osteoarthritis of the left knee joint. Tibia and fibula are intact. There is no soft tissue gas. There is extensive atherosclerotic plaque There is diffuse muscular atrophy There is cutaneous and subcutaneous edema consistent with cellulitis. There are no discrete fluid collection. There is osteoarthritis of the midfoot.     1.  Left leg cellulitis without abscess or  soft tissue gas 2.  Small vessel atherosclerotic plaque 3.  osteoarthritis of the left knee and left midfoot 4.  Muscular atrophy    Ct-extremity, Lower With Left    Result Date: 11/25/2020 11/25/2020 9:54 PM HISTORY/REASON FOR EXAM:  Persistent wound drainage; Persistent wound drainage from left AKA, evaluate for deep fluid collection. TECHNIQUE/EXAM DESCRIPTION AND NUMBER OF VIEWS:  CT scan of the LEFT lower extremity with contrast, with reconstructions. Thin helical 3 mm sections were obtained from the distal femur through the proximal tibia/fibula. Sagittal and coronal multiplanar reconstructions were generated from the axial images. A total of 100 mL of Omnipaque 350 nonionic contrast was administered  IV without complication. Up to date radiation dose reduction adjustments have been utilized to meet ALARA standards for radiation dose reduction. COMPARISON: None. FINDINGS: There is left thigh cutaneous edema. There is a fluid collection along the posterior aspect of the above the knee amputation. This measures approximately 7.8 x 2.3 x 5.3 cm. No obvious peripheral enhancement identified to suggest infection. No soft tissue gas identified. There are varicose veins identified.     Fluid collection along the distal posterior aspect of the above the knee amputation measuring 7.8 x 2.3 x 5.3 cm without significant peripheral enhancement to suggest abscess. Extensive left thigh subcutaneous edema.    Dx-chest-limited (1 View)    Result Date: 11/25/2020 11/25/2020 6:02 AM HISTORY/REASON FOR EXAM:  Shortness of Breath TECHNIQUE/EXAM DESCRIPTION AND NUMBER OF VIEWS: Single portable view of the chest. COMPARISON: 11/1/2020 FINDINGS: Cardiomediastinal silhouette is stable. Aortic calcified atherosclerotic plaque. Sternotomy wires. There are diffuse interstitial opacities probably representing pulmonary edema. Infection should be excluded clinically. No significant pleural effusion or pneumothorax. No acute osseous  abnormality.     Moderate bilateral interstitial opacities which likely represents pulmonary edema. Stable cardiac silhouette enlargement.    Dx-chest-limited (1 View)    Result Date: 11/1/2020 11/1/2020 4:42 PM HISTORY/REASON FOR EXAM:  CVL placement. Central line placement TECHNIQUE/EXAM DESCRIPTION AND NUMBER OF VIEWS: Single AP view of the chest. COMPARISON:  1 view chest 10/31/2020 FINDINGS: Right internal jugular catheter has been placed. The tip projects appropriately over the superior vena cava. LUNGS: There are pulmonary interstitial and alveolar densities that are consistent with edema. HEART and MEDIASTINUM: enlarged. There has been previous sternotomy. Pleura: There are no pleural effusion or pneumothoraces. Osseous structures: No significant bony abnormality.     1.  Right internal jugular catheter is been placed and appears appropriately located 2.  Moderate pulmonary edema 3.  Enlarged cardiac silhouette    Dx-chest-portable (1 View)    Result Date: 10/31/2020    10/31/2020 6:58 AM HISTORY/REASON FOR EXAM: Pleural Effusion TECHNIQUE/EXAM DESCRIPTION:  Single AP view of the chest. COMPARISON: October 29, 2020 FINDINGS: Overlying cardiac leads are present. Cardiomegaly is observed.  Postsurgical changes of sternotomy are noted. The mediastinal contour appears within normal limits.  The central  pulmonary vasculature appears prominent and indistinct. The lungs appear well expanded bilaterally.  Diffuse scattered hazy pulmonary parenchymal opacities are seen. No significant pleural effusions are identified. The bony structures appear age-appropriate.     1.  Pulmonary edema and/or infiltrates are identified, which are stable since the prior exam. 2.  Cardiomegaly    Dx-chest-portable (1 View)    Result Date: 10/30/2020    10/29/2020 11:32 PM HISTORY/REASON FOR EXAM: Shortness of Breath TECHNIQUE/EXAM DESCRIPTION:  Single AP view of the chest. COMPARISON: September 10, 2019 FINDINGS: Overlying cardiac  leads are present. Cardiomegaly is observed.  Postsurgical changes of sternotomy are noted. The mediastinal contour appears within normal limits.  The central  pulmonary vasculature appears prominent and indistinct. The lungs appear well expanded bilaterally.  Hazy interstitial bilateral pulmonary opacities are seen. No significant pleural effusions are identified. The bony structures appear age-appropriate.     1.  Interstitial pulmonary parenchymal prominence, compatible with interstitial edema and/or infiltrates. 2.  Cardiomegaly    Dx-tibia And Fibula Left    Addendum Date: 10/30/2020    Addendum: Subtle clustered punctate lucencies lateral to the ankle are seen which could represent tiny foci of soft tissue gas. These findings were discussed with the patient's clinician, ELIZABETH HILARIO, on 10/30/2020 12:34 AM.    Result Date: 10/30/2020  10/29/2020 11:32 PM HISTORY/REASON FOR EXAM: Atraumatic Pain/Swelling/Deformity TECHNIQUE/EXAM DESCRIPTION:  AP and lateral views of the LEFT tibia and fibula. COMPARISON:  None FINDINGS: Tricompartmental degenerative changes of the knee are seen. Bony structures and articulations appear within normal limits without visualized fracture, subluxation, or dislocation. Atherosclerotic changes are seen. Soft tissue phleboliths are seen. Soft tissue edema is noted.     1.  No acute traumatic bony injury. 2.  Tricompartmental degenerative changes of the knee. 3.  Atherosclerosis    Dx-tibia And Fibula Right    Result Date: 10/30/2020  10/29/2020 11:32 PM HISTORY/REASON FOR EXAM: Atraumatic Pain/Swelling/Deformity TECHNIQUE/EXAM DESCRIPTION:  AP and lateral views of the RIGHT tibia and fibula. COMPARISON:  None FINDINGS: Tricompartmental degenerative changes of the knee are seen, otherwise the bony structures and articulations appear within normal limits without visualized fracture, subluxation, or dislocation. Atherosclerotic changes are seen. Scattered soft tissue level associated.      1.  No acute traumatic bony injury. 2.  Tricompartmental degenerative changes of the knee 3.  Atherosclerosis    Us-extremity Artery Lower Bilat    Result Date: 10/30/2020  Lower Extremity  Arterial Duplex Report  Vascular Laboratory  CONCLUSIONS  1) Bilateral atherosclerosis  2) Right distal SFA 50-75% stenosis.  2) Monophasic waveforms in the left lower extremity  JARRETT WHIET  Exam Date:     10/30/2020 01:09  Room #:     Inpatient  Priority:     Stat  Ht (in):             Wt (lb):  Ordering Physician:        ELIZABETH HILARIO  Referring Physician:       ELIZABETH HILARIO  Sonographer:               Silvia Lassiter RVT                             [facility]  Study Type:                Complete Bilateral  Technical Quality:         Adequate  Age:    54    Gender:     M  MRN:    [id number]  :    1965      BSA:  Indications:     Ulceration of LE  CPT Codes:       38784  ICD Codes:       707.1  History:         Nonhealing, raw and bleeding wounds bilaterally. Blue                   discoloration of limbs. Severe pain bilaterally.  Limitations:     Pain.                RIGHT  Waveform        Peak Systolic Velocity (cm/s)                  Prox    Prox-Mid  Mid    Mid-Dist  Distal  Triphasic                         107                      CFA  Triphasic       110                                        PFA  Triphasic       96                89      214      79      SFA  Biphasic                          44                       POP  Biphasic                                           49      AT  Not                                                        PT  attained  Not                                                        BRITTON  attained                LEFT  Waveform        Peak Systolic Velocity (cm/s)                  Prox    Prox-Mid  Mid    Mid-Dist  Distal  Monophasic                        137              109     CFA  Biphasic        67                                         PFA  Monophasic      98                 125                      SFA                                                             POP                                                             AT                                                             PT                                                             BRITTON  FINDINGS  Right.  There is atherosclerotic plaque seen throughout the limb.  Stenosis of the distal femoral artery. Velocities are consistent with 50-  75% stenosis.  Waveforms at the common femoral, profunda femoral and femoral artery are  triphasic.  Waveforms at the popliteal and distal anterior tibial artery are biphasic.  The remaining vessels were not properly visualized evaluated due to edema,  severe pain in non-healing bleeding wounds and large body habitus.  Left.  There is atherosclerotic plaque seen throughout the limb.  Waveforms at the common femoral, profunda femoral and femoral artery are  monophasic.  The remaining vessels of the left lower limb were not properly  visualized/evaluated due to edema, severe pain in area of non-healing  bleeding wounds and large body habitus.  Nima Dill MD  (Electronically Signed)  Final Date:      2020                   03:22    Us-extremity Artery Lower Unilat Left    Result Date: 2020  Lower Extremity  Arterial Duplex Report  Vascular Laboratory  CONCLUSIONS  Prior study 10/30/20  Triphasic waveforms seen at the common femoral, profunda, and proximal-mid  femoral artery.  JARRETT WHITE  Exam Date:     2020 12:54  Room #:     Inpatient  Priority:     Routine  Ht (in):             Wt (lb):  Ordering Physician:        SYMONE NUÑEZ  Referring Physician:       [id number] Rooney  Sonographer:               Sonam Mccarty RVT, RDMS  Study Type:                Complete Unilateral  Technical Quality:         Poor  Age:    54    Gender:     M  MRN:    [id number]  :    1965      BSA:  Indications:      Atherosclerosis of peripheral arteries  CPT Codes:       75111  ICD Codes:       440.20  History:         Left above the knee amputation. PVD. Prior study 10/30/20  Limitations:     Technically difficult study due to patient body habitus.                RIGHT  Waveform        Peak Systolic Velocity (cm/s)                  Prox    Prox-Mid  Mid    Mid-Dist  Distal                                                             CFA                                                             PFA                                                             SFA                                                             POP                                                             AT                                                             PT                                                             BRITTON                LEFT  Waveform        Peak Systolic Velocity (cm/s)                  Prox    Prox-Mid  Mid    Mid-Dist  Distal  Triphasic                         58                       CFA  Triphasic       49                                         PFA  Triphasic       60                54                       SFA                                                             POP                                                             AT                                                             PT                                                             BRITTON  FINDINGS  Left.  Triphasic waveforms seen at the common femoral, profunda, and proximal-mid  femoral artery.  Distal femoral artery not visualized.  Xiang Mendieta MD  (Electronically Signed)  Final Date:      25 November 2020                   15:25    Us-extremity Venous Lower Bilat    Result Date: 11/25/2020   Vascular Laboratory  CONCLUSIONS  No prior study is available for comparison.  Limited study, no obvious DVT.  JARRETT WHITE  Exam Date:     11/25/2020 12:43  Room #:     Inpatient  Priority:     Routine  Ht (in):             Wt (lb):   Ordering Physician:        SYMONE NUEÑZ  Referring Physician:       [id number] Hyatt  Sonographer:               Sonam Mccarty RVT, RDMS  Study Type:                Limited Bilateral  Technical Quality:         Poor  Age:    54    Gender:     M  MRN:    [id number]  :    1965      BSA:  Indications:     Edema  CPT Codes:       15889  ICD Codes:       782.3  History:         Edema  Limitations:     Technically difficult study due to patient body habitus.  PROCEDURES:  Bilateral lower extremity venous duplex imaging.  The following venous structures were evaluated: common femoral, profunda  femoral, prroximal greater saphenous, femoral, popliteal , peroneal and  posterior tibial veins.  *Left above the knee amputation.  FINDINGS:  Right lower extremity -  Patient refused compressions at the mid femoral, distal femoral, and  popliteal vein. Complete color filling visualized.  Calf veins not visualized.  All other veins of the right lower extremity demonstrate normal flow  dynamics with no evidence of deep vein thrombosis.  Left lower extremity -  Left above the knee amputation.  Patient refused compressions at the common femoral and distal femoral vein.  Complete color filling visualized.  All other veins of the left lower extremity demonstrate normal flow  dynamics with no evidence of deep vein thrombosis.  Xiang Mendieta MD  (Electronically Signed)  Final Date:      2020                   14:00    Us-extremity Venous Lower Bilat    Result Date: 10/30/2020   Vascular Laboratory  CONCLUSIONS  1) Normal bilateral superficial and deep venous examination of the lower  extremities.  2) Lower extremity edema, limits diagnostic sensitivity of this exam  JARRETT WHITE  Exam Date:     10/30/2020 00:46  Room #:     Inpatient  Priority:     Stat  Ht (in):             Wt (lb):  Ordering Physician:        ELIZABETH HILARIO  Referring Physician:        [id number]  Sonographer:               Silvia Lassiter RVT, RDMS  Study Type:                Complete Bilateral  Technical Quality:         Adequate  Age:    54    Gender:     M  MRN:    [id number]  :    1965      BSA:  Indications:     Localized swelling, mass and lump, lower limb, bilateral,                   Edema, unspecified, Ulceration of LE  CPT Codes:       61546  ICD Codes:       R22.43  R60.9  707.1  History:         Swelling of bilateral lower limbs. Edema. Nonhealing, raw and                   bleeding wounds bilaterally.  Limitations:     Edema, large body habitus and severe pain in limbs at touch  PROCEDURES:  Bilateral lower extremity venous duplex imaging.  The following venous structures were evaluated: common femoral, profunda  femoral, proximal greater saphenous, femoral, popliteal , peroneal and  posterior tibial veins.  Serial compression, augmentation maneuvers,  color and spectral Doppler  flow evaluations were performed.  FINDINGS:  Bilateral lower extremities  No superficial or deep venous thrombosis.  Complete color filling and compressibility with normal venous flow dynamics  including spontaneous flow, response to augmentation maneuvers, and  respiratory phasicity.  The peroneal and posterior tibial veins are difficult to assess for  compressibility and flow by color flow due to severe pain in limbs through  the nonhealing bleeding wounds and edema.  Interstitial fluid consistent with edema is observed in the thigh and below  the knee.  Edema reduces the image quality.  Nima Dill MD  (Electronically Signed)  Final Date:      2020                   03:19    Ec-echocardiogram Complete W/o Cont    Result Date: 10/31/2020  Transthoracic Echo Report Echocardiography Laboratory CONCLUSIONS Poor quality echo, consider repeating with contrast Probably normal LVEF Mild aortic stenosis. RVSP estimated at 30-35 mmHg. JARRETT WHITE Exam Date:          10/31/2020                    09:44 Exam Location:     Inpatient Priority:          Routine Ordering Physician:        MORIAH HUI Referring Physician: Sonographer:               Stalin Mtz RDCS Age:    54     Gender:    M MRN:    [id number] :    1965 BSA:    2.58   Ht (in):    77     Wt (lb):    280 Exam Type:     Complete Indications:     Heart Failure, Systolic ICD Codes:       428.2 CPT Codes:       11634 BP:   119    /   67     HR:   73 Technical Quality:       Technically difficult study                          incomplete information is                          obtained MEASUREMENTS  (Male / Female) Normal Values 2D ECHO LVOT Diameter                     2 cm                  DOPPLER AV Peak Velocity                  2.6 m/s               AV Peak Gradient                  27.9 mmHg             AV Mean Gradient                  17.7 mmHg             LVOT Peak Velocity                1.4 m/s               AV Area Cont Eq vti               1.6 cm2               MV Velocity Time Integral         41 cm                 Mitral E Point Velocity           1 m/s                 Mitral E to A Ratio               1.1                   Mitral A Duration                 96.9 ms               MV Pressure Half Time             102 ms                MV Area PHT                       2.2 cm2               MV Deceleration Time              352 ms                TR Peak Velocity                  258 cm/s              PV Peak Velocity                  1.3 m/s               PV Peak Gradient                  6.5 mmHg              PV Mean Gradient                  3.9 mmHg              * Indicates values subject to auto-interpretation LV EF:        % FINDINGS Left Ventricle Normal left ventricular chamber size. Normal left ventricular wall thickness. Unable to determine diastolic function. Reliable ejection fraction estimate cannot be made due to technical limitation. Right Ventricle Right ventricle not well  visualized. Right Atrium Dilated inferior vena cava with inspiratory collapse. Left Atrium Left atrium not well visualized. Mitral Valve Mitral annular calcification. No stenosis or regurgitation seen. Aortic Valve The aortic valve is not well visualized. Mild aortic stenosis. Vmax is 2.5  m/s. Transvalvular gradients are - Peak: 26 mmHg, Mean: 18 mmHg. No aortic insufficiency. Tricuspid Valve Structurally normal tricuspid valve. No stenosis or regurgitation seen. RVSP estimated at 30-35 mmHg Pulmonic Valve Structurally normal pulmonic valve. No pulmonic stenosis. Mild pulmonic insufficiency. Pericardium Normal pericardium without effusion. Aorta Normal aortic root for body surface area. Ascending aorta diameter is 3.2 cm. Quinton Bueno MD (Electronically Signed) Final Date:     2020                 12:02    [facility] W/ Cont    Result Date: 11/3/2020  Transthoracic Echo Report Echocardiography Laboratory CONCLUSIONS Compared to the images of the prior study done on 10/31/2020, no significant changes are noted. Left ventricular ejection fraction is visually estimated to be 50%. Mild aortic stenosis. Estimated right ventricular systolic pressure  is 40 mmHg. JARRETT WHITE Exam Date:         2020                    09:41 Exam Location:     Inpatient Priority:          Routine Ordering Physician:        QUINTON BUENO                             ([id number]) Referring Physician:       XIMENA De Jesus Sonographer:               Analia Swan [facility] Age:    54     Gender:    M MRN:    [id number] :    1965 BSA:    2.84   Ht (in):    77     Wt (lb):    350 Exam Type:     Limited, Contrast Indications:     Congestive Heart Failure ICD Codes:       428 CPT Codes:       66302, , 96829, 19557 BP:   113    /   74     HR: Technical Quality:       Fair MEASUREMENTS  (Male / Female) Normal Values 2D ECHO Estimated LV Ejection Fraction    50 %                  IVC Diameter                      2.2  cm                DOPPLER AV Peak Velocity                  2.7 m/s               AV Peak Gradient                  30.1 mmHg             AV Mean Gradient                  18.1 mmHg             LVOT Peak Velocity                1.3 m/s               MV Velocity Time Integral         35.8 cm               MV Pressure Half Time             80 ms                 MV Area PHT                       2.8 cm2               TR Peak Velocity                  299 cm/s              * Indicates values subject to auto-interpretation LV EF:  50    % FINDINGS Left Ventricle 3 mL of contrast was administered. Existing IV was used. Contrast was used to enhance visualization of the endocardial border. Left ventricular systolic function is low normal. Left ventricular ejection fraction is visually estimated to be 50%. Right Ventricle Right Atrium Left Atrium Mitral Valve Mild mitral stenosis. Mean transvalvular gradient is 3  mmHg at a heart rate of 78  BPM. Aortic Valve Mild aortic stenosis. Vmax is  2.6 m/s. Transvalvular gradients are - Peak: 29 mmHg, Mean: 17 mmHg. Tricuspid Valve Mild tricuspid regurgitation. Right atrial pressure is estimated to be 3 mmHg. Estimated right ventricular systolic pressure  is 40 mmHg. Pulmonic Valve Pericardium Normal pericardium without effusion. Aorta Jerry Reyes MD (Electronically Signed) Final Date:     03 November 2020                 12:41      Micro:  Results     Procedure Component Value Units Date/Time    GRAM STAIN [[id number]] Collected: 11/26/20 1007    Order Status: Completed Specimen: Tissue Updated: 11/26/20 1322     Significant Indicator .     Source TISS     Site Left Stump wound#2     Gram Stain Result Rare epithelial cells.  No organisms seen.      Narrative:      Surgery Specimen    GRAM STAIN [[id number]] Collected: 11/26/20 1007    Order Status: Completed Specimen: Wound Updated: 11/26/20 1321     Significant Indicator .     Source WND     Site Left Stump wound #1     Gram  "Stain Result Rare WBCs.  No organisms seen.      Narrative:      Surgery - swabs received    CULTURE TISSUE W/ GRM STAIN [[id number]] Collected: 11/26/20 1007    Order Status: Completed Specimen: Other Updated: 11/26/20 1109    Anaerobic Culture [[id number]] Collected: 11/26/20 1007    Order Status: Completed Specimen: Other Updated: 11/26/20 1109    CULTURE WOUND W/ GRAM STAIN [[id number]] Collected: 11/26/20 1007    Order Status: Completed Specimen: Other Updated: 11/26/20 1105    Anaerobic Culture [[id number]] Collected: 11/26/20 1007    Order Status: Completed Specimen: Other Updated: 11/26/20 1105    BLOOD CULTURE [[id number]] Collected: 11/25/20 1431    Order Status: Completed Specimen: Blood from Peripheral Updated: 11/26/20 0947     Significant Indicator NEG     Source BLD     Site PERIPHERAL     Culture Result No Growth  Note: Blood cultures are incubated for 5 days and  are monitored continuously.Positive blood cultures  are called to the RN and reported as soon as  they are identified.      Narrative:      Per Hospital Policy: Only change Specimen Src: to \"Line\" if  specified by physician order.  Right Hand    BLOOD CULTURE [[id number]] Collected: 11/25/20 1013    Order Status: Completed Specimen: Blood from Peripheral Updated: 11/26/20 0947     Significant Indicator NEG     Source BLD     Site PERIPHERAL     Culture Result No Growth  Note: Blood cultures are incubated for 5 days and  are monitored continuously.Positive blood cultures  are called to the RN and reported as soon as  they are identified.      Narrative:      Per Hospital Policy: Only change Specimen Src: to \"Line\" if  specified by physician order.  Right Hand    CoV-2, Flu A/B, And RSV by PCR [[id number]] Collected: 11/25/20 0140    Order Status: Completed Updated: 11/25/20 1238     Influenza virus A RNA Negative     Influenza virus B, PCR Negative     RSV, PCR Negative     SARS-CoV-2 by PCR NotDetected     Comment: PATIENTS: Important information " "regarding your results and instructions can  be found at [email or web address]   \"After your  Covid-19 Test\"  [facility] providers: PLEASE REFER TO DE-ESCALATION AND RETESTING PROTOCOL  on [email or web address]  **The [employer] GeneXpert Xpress SARS-CoV-2 Test has been made available for  use under the Emergency Use Authorization (EUA) only.          SARS-CoV-2 Source NP Swab    Narrative:      Collected By:[id number] RITESHRASHAUN RANJIT JAQUAN  Is patient being admitted?->Yes  Does this patient meet criteria for Rush/Cepheid per [facility]  Inpatient Workflow? (See workflow link below)->No  Expected turn around time?->Routine (In-House PCR up to 24  hours)  Have you been in close contact with a person who is suspected  or known to be positive for COVID-19 within the last 30 days  (e.g. last seen that person < 30 days ago)->Unknown    Routine (COVID/SARS COV-2 In-House PCR up to 24 hours) [[id number]] Collected: 11/25/20 0140    Order Status: Completed Specimen: Respirate from Nasopharyngeal Updated: 11/25/20 0225     COVID Order Status Received     Comment: The order for SARS CoV-2 testing has been received by the  Laboratory. This result is neither positive nor negative.  Final results of testing will report in 24-48 hours, separately.         Narrative:      Collected By:[id number] ABSIGIFREDO ENGLANDAlex  Is patient being admitted?->Yes  Does this patient meet criteria for Rush/Cepheid per [facility]  Inpatient Workflow? (See workflow link below)->No  Expected turn around time?->Routine (In-House PCR up to 24  hours)  Have you been in close contact with a person who is suspected  or known to be positive for COVID-19 within the last 30 days  (e.g. last seen that person < 30 days ago)->Unknown    SARS-CoV-2, PCR (In-House) [[id number]] Collected: 11/25/20 0140    Order Status: Canceled           Assessment/Hospital Course:  54-year-old male with known atrial fibrillation, methamphetamine abuse, CAD, PVD, transferred from a " hospital in [place] on 11/24/2020 following an admission for CHF exacerbation.  Earlier this month, patient was admitted here with group A strep bacteremia and bilateral lower extremity necrotizing cellulitis with cultures growing group A strep, MSSA.  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  During his admission at the [facility], reportedly some purulent drainage was noted from his poorly functioning wound VAC, thus he was transferred here.  CT scan showed a fluid collection along the distal posterior aspect of the AKA measuring 7.8 x 2.3 x 5.3 cm, this was not peripherally enhancing.  Per verbal report, purulent drainage was also noted at the amputation site here, prior to I&D. Patient was taken to the OR on 11/26 for I&D, gross appearance of liquefied hematoma was noted.  Cultures are pending.    Plan:  -Reports of purulence at amputation site prior to I&D both here by my colleague as well as per reports at outside hospital acknowledged. Continue IV Ancef for now to cover previously grown organisms given likely infected hematoma  -Given proximity to residual femur, will likely treat with an extended course of antibiotics for 6 weeks  -Given poor compliance and methamphetamine abuse, best course of action would be placement at a facility with continued wound care and IV Ancef for 6 weeks.  However, if patient refuses, recommend p.o. Augmentin 875 mg twice daily for 6 weeks, along with outpatient wound care    Discussed with internal medicine, Dr. Sylvia BENITEZ will follow.  Please call with questions.

## 2020-11-27 NOTE — PROGRESS NOTES
"Pt A&Ox4. VS: BP (!) 96/49 Comment: RN  notified  Pulse 77   Temp 36.7 °C (98.1 °F) (Temporal)   Resp 18   Ht 1.956 m (6' 5\")   Wt (!) 160.5 kg (353 lb 13.4 oz)   SpO2 95%   BMI 41.96 kg/m² . Pt denies n/v, numbness, tingling, SOB and chest pain. Pt states pain 8/0, prn pain medication administered per MAR. PIV patent with positive blood return. Wound vac in place. Pt needs met at this time, call light within reach, hourly rounding in effect, and will continue to monitor.       "

## 2020-11-27 NOTE — PROGRESS NOTES
"   Orthopaedic PA Progress Note    Interval changes:Did well overnight . Gram stain - no orgs. No Cx results yet    ROS - Patient denies any new issues. No chest pain, dyspnea, or fever.  Pain well controlled.    /60   Pulse 77   Temp 36.7 °C (98 °F) (Temporal)   Resp 18   Ht 1.956 m (6' 5\")   Wt (!) 160.5 kg (353 lb 13.4 oz)   SpO2 93%     Patient seen and examined  No acute distress  Breathing non labored  RRR  LLE Vac intact, dressing intact,prox compartments soft    Recent Labs     11/25/20  0059 11/27/20  0417   WBC 9.7 9.1   RBC 3.14* 3.15*   HEMOGLOBIN 9.0* 9.2*   HEMATOCRIT 31.3* 31.7*   MCV 99.7* 100.6*   MCH 28.7 29.2   MCHC 28.8* 29.0*   RDW 71.0* 72.5*   PLATELETCT 270 244   MPV 10.0 9.9       Active Hospital Problems    Diagnosis   • Soft tissue infection [L08.9]     Priority: High   • Paroxysmal atrial fibrillation (HCC) [I48.0]     Priority: High   • COPD (chronic obstructive pulmonary disease) ([facility]) [J44.9]     Priority: High   • Pulmonary HTN ([facility]) [I27.20]     Priority: Medium   • Acute exacerbation of CHF (congestive heart failure) ([facility]) [I50.9]     Priority: Medium   • Type 2 diabetes mellitus with complication, without long-term current use of insulin ([facility]) [E11.8]     Priority: Medium   • CKD (chronic kidney disease), stage III [N18.30]     Priority: Low   • SHASHANK (obstructive sleep apnea) [G47.33]     Priority: Low   • Essential hypertension [I10]     Priority: Low       Assessment/Plan:  POD#12 S/P I+D LLE,  L AKA hx, application wound vac  Wt bearing status - NWB LLE  PT/OT-initiated  Wound care:wound care to follow to secondary closure  Drains - no  Ceballos-no  Sutures/Staples out- 10-14 days post operatively  Antibiotics: Ancef now, cx pending  DVT Prophylaxis- TEDS/SCDs/Foot pumps.   Heparin gtt per Med  Future Procedures - not anticipated  Case Coordination for Discharge Planning - Disposition pending Abx plan, wound care plan, likely Rehab vs. SNF vs LTAC. [place] Resident. Hx " AMA departure and mult. Complaints re. Prior nursing care.

## 2020-11-27 NOTE — DISCHARGE PLANNING
"Anticipated Discharge Disposition: TBD    Action: Chart reviewed, ID and Ortho are following this patient. Per Hospitalist, \"Disposition  TBD, I&D culture pending. \"    Per Ortho note, \"Case Coordination for Discharge Planning - Disposition pending Abx plan, wound care plan, likely Rehab vs. SNF vs LTAC. [place] Resident. Hx AMA departure and mult. Complaints re. Prior nursing care.\"    Barriers to Discharge: POC/GOC, disposition determination     Plan: RN CM to complete CTT assessment   "

## 2020-11-27 NOTE — PROGRESS NOTES
[facility] Medicine Daily Progress Note    Date of Service  11/27/2020    Chief Complaint  54 y.o. male admitted 11/24/2020 with left AKA wound infection     Hospital Course  A 54 y.o. male with PMH of CAD, DM, COPD, CHF, and A-fib who had recent left AKA on 11/12 who left AMA from hospital, admitted on 11/24/20 as a transfer from [place], [place] for left AKA wound infection and shortness of breath secondary to CHF. ID & Ortho on board. On cefazolin 2g IV Q8H based on prior sensitivities. Ortho I & D left AKA wound on 11/26.     Interval Problem Update  Patient was seen and examined at bedside. No changes noticed compared to yesterday's exam.   S/p  Ortho I & D left AKA wound on 11/26. Wound VAC placed left lower extremity.  Per Ortho: Wt bearing status - NWB LLE  ID on board, continue Ancef, I&D culture pending.       Consultants/Specialty  ID   Infectious disease    Code Status  Full Code    Disposition  TBD, I&D culture pending.     Review of Systems  Review of Systems   Constitutional: Negative for chills, fever and malaise/fatigue.   HENT: Negative for congestion, ear discharge, ear pain, sinus pain and sore throat.    Eyes: Negative for blurred vision and double vision.   Respiratory: Positive for cough, shortness of breath and wheezing. Negative for sputum production.    Cardiovascular: Positive for leg swelling. Negative for chest pain and palpitations.   Gastrointestinal: Negative for abdominal pain, constipation, diarrhea, nausea and vomiting.   Genitourinary: Negative for dysuria, frequency and urgency.   Musculoskeletal: Positive for joint pain. Negative for myalgias.   Neurological: Positive for dizziness and weakness. Negative for focal weakness and headaches.   Psychiatric/Behavioral: The patient is not nervous/anxious.         Physical Exam  Temp:  [36.1 °C (97 °F)-36.8 °C (98.2 °F)] 36.7 °C (98 °F)  Pulse:  [69-88] 80  Resp:  [16-19] 18  BP: ()/(54-69) 110/60  SpO2:  [92 %-98 %] 94  %    Physical Exam  Constitutional:       General: He is not in acute distress.     Appearance: He is obese.   HENT:      Head: Normocephalic and atraumatic.      Nose: Nose normal.      Mouth/Throat:      Mouth: Mucous membranes are moist.      Pharynx: No posterior oropharyngeal erythema.   Eyes:      General: No scleral icterus.     Extraocular Movements: Extraocular movements intact.      Conjunctiva/sclera: Conjunctivae normal.      Pupils: Pupils are equal, round, and reactive to light.   Neck:      Musculoskeletal: Normal range of motion and neck supple. No neck rigidity.   Cardiovascular:      Rate and Rhythm: Normal rate. Rhythm irregular.      Pulses: Normal pulses.      Heart sounds: No murmur. No gallop.    Pulmonary:      Effort: Pulmonary effort is normal.      Breath sounds: Normal breath sounds. No stridor. No wheezing, rhonchi or rales.   Abdominal:      General: Bowel sounds are normal. There is distension.      Palpations: Abdomen is soft.      Tenderness: There is no abdominal tenderness. There is no guarding.   Musculoskeletal:         General: Swelling present. No tenderness.      Comments: Left AKA amputation  S/p I&D on left AKA wound, wound VAC in placed   Skin:     General: Skin is warm.      Comments: Chronic venous stasis changes on her right lower extremity   Neurological:      General: No focal deficit present.      Mental Status: He is alert and oriented to person, place, and time.   Psychiatric:         Mood and Affect: Mood normal.         Behavior: Behavior normal.         Fluids    Intake/Output Summary (Last 24 hours) at 11/27/2020 1138  Last data filed at 11/27/2020 1002  Gross per 24 hour   Intake 240 ml   Output 2075 ml   Net -1835 ml       Laboratory  Recent Labs     11/25/20  0059 11/27/20  0417   WBC 9.7 9.1   RBC 3.14* 3.15*   HEMOGLOBIN 9.0* 9.2*   HEMATOCRIT 31.3* 31.7*   MCV 99.7* 100.6*   MCH 28.7 29.2   MCHC 28.8* 29.0*   RDW 71.0* 72.5*   PLATELETCT 270 244   MPV 10.0  9.9     Recent Labs     11/25/20  0059 11/27/20  0417   SODIUM 134* 138   POTASSIUM 4.0 4.0   CHLORIDE 100 102   CO2 30 31   GLUCOSE 129* 110*   BUN 13 12   CREATININE 0.72 0.95   CALCIUM 8.7 8.8     Recent Labs     11/25/20  0133 11/25/20  0558 11/26/20  1434 11/27/20  0417   APTT 43.1*  --   --   --    INR 1.97* 1.84* 1.60* 1.69*               Imaging  CT-EXTREMITY, LOWER WITH LEFT   Final Result      Fluid collection along the distal posterior aspect of the above the knee amputation measuring 7.8 x 2.3 x 5.3 cm without significant peripheral enhancement to suggest abscess.      Extensive left thigh subcutaneous edema.      CT-ABDOMEN WITH & W/O   Final Result      Resolution of cystic pancreas lesion most consistent with pancreatic pseudocyst.      Cirrhotic appearing liver with splenomegaly.      Nonobstructive right renal stone.      Possible gallstones            US-EXTREMITY ARTERY LOWER UNILAT LEFT   Final Result      US-EXTREMITY VENOUS LOWER BILAT   Final Result      DX-CHEST-LIMITED (1 VIEW)   Final Result      Moderate bilateral interstitial opacities which likely represents pulmonary edema.      Stable cardiac silhouette enlargement.           Assessment/Plan  * Soft tissue infection  Assessment & Plan  s/p initial left intitial knee amputation on 11/2/20 with Dr. Durbin, followed by completion of left AKA on 11/12.  Patient left [facility] AMA on 11/17  Presented to [place] on 11/20 with purulent drainage from wound vac insertion site, with wound vac removal at outside hospital.   - Infectious Disease and orthopedic Sx on board  - on cefazolin 2g IV Q8H based on prior sensitivities  -I&D by orthopedic Sx on 11/26, wound VAC placed  -Pain control    11/27: Continue Ancef, final I&D culture pending      Paroxysmal atrial fibrillation (HCC)- (present on admission)  Assessment & Plan   Continue Amiodarone 200mg QAM & metoprolol   [id number] =3    INR 2.01 on 11/24  Restarted heparin drip after the orthopedic  procedure & now back on Warfarin     COPD (chronic obstructive pulmonary disease) ([facility])- (present on admission)  Assessment & Plan  - COPD exacerbation on admission  - PFTS from 2018 revealed FVC 37% predicted.  FEV1 35% predicted.  No   significant response to bronchodilators.  FEV1/FVC is 73. Total lung capacity 5.29,     Diffusion DLCO 41% predicted indicating mixed obstructive and restrictive disease  - wheezing on exam and c/o shortness of breath  -Chest x-ray showed pulmonary edema  -Oxygen per protocol  -RT protocol with inhalers  - acapella treatment  - will avoid systemic steroids at this time due to active soft tissue infection      Pulmonary HTN ([facility])  Assessment & Plan  Estimated right ventricular systolic pressure  is 40 mmHg on echo on 11/2  - likely secondary to CHF, COPD and SHASHANK  - continue to managemCOPD and CHF exacerbation      Acute exacerbation of CHF (congestive heart failure) ([facility])- (present on admission)  Assessment & Plan  PMH of CAD and NSTEMI  Patient recently admitted to [facility] found to have CHF exacerbation  - Last echo on 11/2  notable for LVEF 50%, Mild aortic stenosis,   Toprol 25mg QAM  Rosuvastatin 40mg daily  Lasix 40mg daily  Aldactone 25mg daily  Daily weights and I/Os    Type 2 diabetes mellitus with complication, without long-term current use of insulin ([facility])- (present on admission)  Assessment & Plan  - A1c 7.8 on 10/30/2020  - ISS with hypoglycemia protocol  - DM diet  -Titrating insulin for better glycemic control    SHASHANK (obstructive sleep apnea)- (present on admission)  Assessment & Plan  CPAP per RT    CKD (chronic kidney disease), stage III- (present on admission)  Assessment & Plan  - continue to monitor  - Creatinine at baseline at 0.69    Essential hypertension- (present on admission)  Assessment & Plan  Continue home medications with holding parameters       VTE prophylaxis: SCDs & Warfarin

## 2020-11-27 NOTE — DISCHARGE PLANNING
"Anticipated Discharge Disposition: TBD    Action: Chart reviewed, ID and Ortho are following this patient. Per Hospitalist, \"Disposition  TBD, I&D culture pending. \"    Per Ortho note, \"Case Coordination for Discharge Planning - Disposition pending Abx plan, wound care plan, likely Rehab vs. SNF vs LTAC. [place] Resident. Hx AMA departure and mult. Complaints re. Prior nursing care.\"    1400- RN CM to patient's bedside to complete CTT assessment, as charted and verified facesheet information. Patient states he lives in a one story home with his daughter and son in law, who assists with his ADLs and IADLs. He has a wheelchair and powerchair at home, but wants to get a shower chair and bedside commode. He denies a history of drug abuse, despite utox showing positive for Meth. He declined resources for depression. He understands that a plan is still in place. RN CM made patient aware that RN CM and LSW are available, should needs arise.     Barriers to Discharge: POC/GOC, disposition determination     Plan: RN CM to follow and assist. Follow up with disposition determination to assist with planning     Care Transition Team Assessment  DPOA: Kelvin Alcazar [phone]    Information Source  Orientation : Oriented x 4  Information Given By: Patient         Elopement Risk  Legal Hold: No  Ambulatory or Self Mobile in Wheelchair: No-Not an Elopement Risk  Disoriented: No  Psychiatric Symptoms: None  History of Wandering: No  Elopement this Admit: No  Vocalizing Wanting to Leave: No  Displays Behaviors, Body Language Wanting to Leave: No-Not at Risk for Elopement  Elopement Risk: Not at Risk for Elopement    Interdisciplinary Discharge Planning  Primary Care Physician: LISSA GARCIA  Lives with - Patient's Self Care Capacity: Adult Children  Patient or legal guardian wants to designate a caregiver: No  Support Systems: Children, Spouse / Significant Other  Housing / Facility: 1 Story House  Do You Take your Prescribed " Medications Regularly: Yes  Able to Return to Previous ADL's: Future Time w/Therapy  Mobility Issues: Yes(has wheelchair and power chair at home)  Prior Services: Unable To Determine At This Time  Patient Prefers to be Discharged to:: home  Assistance Needed: Unknown at this Time  Durable Medical Equipment: Walker, Other - Specify(wheelchair)    Discharge Preparedness  What is your plan after discharge?: Uncertain - pending medical team collaboration  What are your discharge supports?: Child, Spouse  Prior Functional Level: Needs Assist with Activities of Daily Living, Needs Assist with Medication Management, Wheelchair Dependent  Difficulity with ADLs: Bathing, Dressing, Toileting, Walking  Difficulty with ADLs Comment: family assists  Difficulity with IADLs: Cooking, Driving, Keeping track of finances, Laundry, Managing medication, Shopping  Difficulity with IADL Comments: family assists    Functional Assesment  Prior Functional Level: Needs Assist with Activities of Daily Living, Needs Assist with Medication Management, Wheelchair Dependent    Finances  Financial Barriers to Discharge: No  Prescription Coverage: Yes    Vision / Hearing Impairment  Vision Impairment : No  Hearing Impairment : No         Advance Directive  Advance Directive?: DPOA for Health Care  Durable Power of  Name and Contact : Abdoulaye Baiglps, Nephew [phone]    Domestic Abuse  Have you ever been the victim of abuse or violence?: No  Physical Abuse or Sexual Abuse: No  Verbal Abuse or Emotional Abuse: No  Possible Abuse/Neglect Reported to:: Not Applicable    Psychological Assessment  History of Substance Abuse: None(denies)  History of Psychiatric Problems: Yes(declined resources. has history of depression)    Discharge Risks or Barriers  Discharge risks or barriers?: Post-acute placement / services  Patient risk factors: Complex medical needs    Anticipated Discharge Information  Discharge Disposition: Still a Patient  (30)  Discharge Address: TBD  Discharge Contact Phone Number: [phone]

## 2020-11-27 NOTE — PROGRESS NOTES
Inpatient Anticoagulation Service Note    Date: 11/27/2020  Reason for Anticoagulation: Atrial Fibrillation  Target INR: 2.0 to 3.0    [id number] VASc Score: 6  HAS-BLED Score: 1   Hemoglobin Value: (!) 9.2  Hematocrit Value: (!) 31.7  Lab Platelet Value: 244    INR from last 7 days     Date/Time INR Value    11/27/20 0417  (!) 1.69    11/26/20 1434  (!) 1.6    11/25/20 0558  (!) 1.84    11/25/20 0133  (!) 1.97    11/24/20 2331  (!) 2.01        Dose from last 7 days     Date/Time Dose (mg)    11/27/20 1419  7.5    11/26/20 1524  10        Average Dose (mg): (home dose 7.5 mg Field/Tu/Th and 5 mg all other days)    Significant Interactions: Amiodarone, Antibiotics, Statin    Bridge Therapy: Yes  Days of Overlap Therapy: 0    Reversal Agent Administered: Not Applicable    Comments: No bleeding or embolic complications noted overnight.  Drug interactions with Amiodarone and Statin but pt is stable on combination at home; drug interaction with Ancef, will monitor effect on INR.  Pt is followed by coag clinic and last seen on 9/2/20.  There is documentation from the coag pharamcist every week with attempts to contact the pt but he has not been seen since September.  Home dose is 7.5 mg on Field/Tu/Th and 5 mg all other days.  A 1x dose of 10 mg was given yesterday evening with a small increase in INR but INR remains subtherapeutic.      Plan: Will order 7.5 mg x 1 today then resume home dosing with 5 mg to be given tomorrow.  Pt on bridge therapy until INR therapeutic given [id number] VASc score of 6.  Pharmacy will continue to monitor and adjust as needed.    Education Material Provided?: No(chronic coumadin therapy)    Pharmacist suggested discharge dosing: may resume home dose if INR stabilizes on that dose       Sabrina Patel, PharmD

## 2020-11-28 ENCOUNTER — APPOINTMENT (OUTPATIENT)
Dept: RADIOLOGY | Facility: MEDICAL CENTER | Age: 55
DRG: 565 | End: 2020-11-28
Attending: HOSPITALIST
Payer: MEDICARE

## 2020-11-28 PROBLEM — S40.021A HEMATOMA OF ARM, RIGHT, INITIAL ENCOUNTER: Status: ACTIVE | Noted: 2020-11-28

## 2020-11-28 LAB
GLUCOSE BLD-MCNC: 111 MG/DL (ref 65–99)
GLUCOSE BLD-MCNC: 112 MG/DL (ref 65–99)
GLUCOSE BLD-MCNC: 127 MG/DL (ref 65–99)
GLUCOSE BLD-MCNC: 141 MG/DL (ref 65–99)
HCT VFR BLD AUTO: 32.3 % (ref 42–52)
HGB BLD-MCNC: 9.4 G/DL (ref 14–18)
INR PPP: 1.82 (ref 0.87–1.13)
PROTHROMBIN TIME: 21.6 SEC (ref 12–14.6)
UFH PPP CHRO-ACNC: 0.65 IU/ML

## 2020-11-28 PROCEDURE — 700111 HCHG RX REV CODE 636 W/ 250 OVERRIDE (IP): Performed by: STUDENT IN AN ORGANIZED HEALTH CARE EDUCATION/TRAINING PROGRAM

## 2020-11-28 PROCEDURE — 85014 HEMATOCRIT: CPT

## 2020-11-28 PROCEDURE — 94640 AIRWAY INHALATION TREATMENT: CPT

## 2020-11-28 PROCEDURE — A9270 NON-COVERED ITEM OR SERVICE: HCPCS | Performed by: STUDENT IN AN ORGANIZED HEALTH CARE EDUCATION/TRAINING PROGRAM

## 2020-11-28 PROCEDURE — 85018 HEMOGLOBIN: CPT

## 2020-11-28 PROCEDURE — 700101 HCHG RX REV CODE 250: Performed by: FAMILY MEDICINE

## 2020-11-28 PROCEDURE — 97605 NEG PRS WND THER DME<=50SQCM: CPT

## 2020-11-28 PROCEDURE — 700102 HCHG RX REV CODE 250 W/ 637 OVERRIDE(OP): Performed by: STUDENT IN AN ORGANIZED HEALTH CARE EDUCATION/TRAINING PROGRAM

## 2020-11-28 PROCEDURE — 99232 SBSQ HOSP IP/OBS MODERATE 35: CPT | Performed by: STUDENT IN AN ORGANIZED HEALTH CARE EDUCATION/TRAINING PROGRAM

## 2020-11-28 PROCEDURE — 85520 HEPARIN ASSAY: CPT

## 2020-11-28 PROCEDURE — 94760 N-INVAS EAR/PLS OXIMETRY 1: CPT

## 2020-11-28 PROCEDURE — 770004 HCHG ROOM/CARE - ONCOLOGY PRIVATE *

## 2020-11-28 PROCEDURE — 94669 MECHANICAL CHEST WALL OSCILL: CPT

## 2020-11-28 PROCEDURE — 36415 COLL VENOUS BLD VENIPUNCTURE: CPT

## 2020-11-28 PROCEDURE — 93971 EXTREMITY STUDY: CPT | Mod: LT

## 2020-11-28 PROCEDURE — 85610 PROTHROMBIN TIME: CPT

## 2020-11-28 PROCEDURE — 93971 EXTREMITY STUDY: CPT | Mod: 26,LT | Performed by: INTERNAL MEDICINE

## 2020-11-28 PROCEDURE — 82962 GLUCOSE BLOOD TEST: CPT | Mod: 91

## 2020-11-28 RX ORDER — MORPHINE SULFATE 4 MG/ML
1 INJECTION, SOLUTION INTRAMUSCULAR; INTRAVENOUS EVERY 6 HOURS PRN
Status: DISCONTINUED | OUTPATIENT
Start: 2020-11-28 | End: 2020-12-08 | Stop reason: HOSPADM

## 2020-11-28 RX ORDER — PETROLATUM 42 G/100G
OINTMENT TOPICAL 2 TIMES DAILY
Status: DISCONTINUED | OUTPATIENT
Start: 2020-11-28 | End: 2020-12-08 | Stop reason: HOSPADM

## 2020-11-28 RX ADMIN — MORPHINE SULFATE 1 MG: 4 INJECTION INTRAVENOUS at 10:20

## 2020-11-28 RX ADMIN — ROSUVASTATIN CALCIUM 40 MG: 20 TABLET, FILM COATED ORAL at 04:49

## 2020-11-28 RX ADMIN — IPRATROPIUM BROMIDE AND ALBUTEROL SULFATE 3 ML: 2.5; .5 SOLUTION RESPIRATORY (INHALATION) at 14:10

## 2020-11-28 RX ADMIN — Medication: at 16:44

## 2020-11-28 RX ADMIN — CEFAZOLIN SODIUM 2 G: 2 INJECTION, SOLUTION INTRAVENOUS at 04:43

## 2020-11-28 RX ADMIN — IPRATROPIUM BROMIDE AND ALBUTEROL SULFATE 3 ML: 2.5; .5 SOLUTION RESPIRATORY (INHALATION) at 11:27

## 2020-11-28 RX ADMIN — WARFARIN SODIUM 5 MG: 5 TABLET ORAL at 16:46

## 2020-11-28 RX ADMIN — CEFAZOLIN SODIUM 2 G: 2 INJECTION, SOLUTION INTRAVENOUS at 22:26

## 2020-11-28 RX ADMIN — QUETIAPINE FUMARATE 12.5 MG: 25 TABLET ORAL at 04:48

## 2020-11-28 RX ADMIN — HEPARIN SODIUM 30 UNITS/KG/HR: 5000 INJECTION, SOLUTION INTRAVENOUS at 04:56

## 2020-11-28 RX ADMIN — SPIRONOLACTONE 25 MG: 25 TABLET ORAL at 04:49

## 2020-11-28 RX ADMIN — OXYCODONE HYDROCHLORIDE 10 MG: 10 TABLET ORAL at 20:45

## 2020-11-28 RX ADMIN — Medication: at 22:27

## 2020-11-28 RX ADMIN — FERROUS SULFATE TAB 325 MG (65 MG ELEMENTAL FE) 325 MG: 325 (65 FE) TAB at 04:49

## 2020-11-28 RX ADMIN — CEFAZOLIN SODIUM 2 G: 2 INJECTION, SOLUTION INTRAVENOUS at 16:45

## 2020-11-28 RX ADMIN — IPRATROPIUM BROMIDE AND ALBUTEROL SULFATE 3 ML: 2.5; .5 SOLUTION RESPIRATORY (INHALATION) at 06:58

## 2020-11-28 RX ADMIN — DOCUSATE SODIUM 50 MG AND SENNOSIDES 8.6 MG 2 TABLET: 8.6; 5 TABLET, FILM COATED ORAL at 04:49

## 2020-11-28 RX ADMIN — METOPROLOL SUCCINATE 25 MG: 25 TABLET, EXTENDED RELEASE ORAL at 04:48

## 2020-11-28 RX ADMIN — BUDESONIDE AND FORMOTEROL FUMARATE DIHYDRATE 2 PUFF: 80; 4.5 AEROSOL RESPIRATORY (INHALATION) at 03:43

## 2020-11-28 RX ADMIN — BUDESONIDE AND FORMOTEROL FUMARATE DIHYDRATE 2 PUFF: 80; 4.5 AEROSOL RESPIRATORY (INHALATION) at 16:47

## 2020-11-28 RX ADMIN — OXYCODONE HYDROCHLORIDE 10 MG: 10 TABLET ORAL at 16:45

## 2020-11-28 RX ADMIN — AMIODARONE HYDROCHLORIDE 200 MG: 200 TABLET ORAL at 04:49

## 2020-11-28 RX ADMIN — OXYCODONE HYDROCHLORIDE 10 MG: 10 TABLET ORAL at 08:56

## 2020-11-28 RX ADMIN — DOCUSATE SODIUM 50 MG AND SENNOSIDES 8.6 MG 2 TABLET: 8.6; 5 TABLET, FILM COATED ORAL at 16:45

## 2020-11-28 RX ADMIN — IPRATROPIUM BROMIDE AND ALBUTEROL SULFATE 3 ML: 2.5; .5 SOLUTION RESPIRATORY (INHALATION) at 20:41

## 2020-11-28 RX ADMIN — FUROSEMIDE 40 MG: 40 TABLET ORAL at 04:49

## 2020-11-28 ASSESSMENT — PAIN DESCRIPTION - PAIN TYPE: TYPE: ACUTE PAIN

## 2020-11-28 ASSESSMENT — ENCOUNTER SYMPTOMS
FEVER: 0
WHEEZING: 1
VOMITING: 0
SORE THROAT: 0
DOUBLE VISION: 0
CHILLS: 0
ABDOMINAL PAIN: 0
PALPITATIONS: 0
MYALGIAS: 0
WEAKNESS: 1
NERVOUS/ANXIOUS: 0
HEADACHES: 0
DIARRHEA: 0
SPUTUM PRODUCTION: 0
SHORTNESS OF BREATH: 1
SINUS PAIN: 0
DIZZINESS: 1
NAUSEA: 0
FOCAL WEAKNESS: 0
CONSTIPATION: 0
BLURRED VISION: 0
COUGH: 1

## 2020-11-28 NOTE — PROGRESS NOTES
"2 mild bloody noses over night. Resolved with pressure. Pt very tearful, I don't want to be alone right now.\" This RN sat with pt and provided emotional support.   "

## 2020-11-28 NOTE — PROGRESS NOTES
[facility] Medicine Daily Progress Note    Date of Service  11/28/2020    Chief Complaint  54 y.o. male admitted 11/24/2020 with left AKA wound infection     Hospital Course  A 54 y.o. male with PMH of CAD, DM, COPD, CHF, and A-fib who had recent left AKA on 11/12 who left AMA from hospital, admitted on 11/24/20 as a transfer from [place], [place] for left AKA wound infection and shortness of breath secondary to CHF. ID & Ortho on board. On cefazolin 2g IV Q8H based on prior sensitivities. Ortho I & D left AKA wound on 11/26.     Interval Problem Update  Patient was seen and examined at bedside. Complaints of pain from RUE hematoma.   S/p  Ortho I & D left AKA wound on 11/26. Wound VAC placed left lower extremity.  Per Ortho: Wt bearing status - NWB LLE  ID on board, continue Ancef x 6 weeks. But if the patient refuses, recommend p.o. Augmentin 875 mg twice daily for 6 weeks, along with outpatient wound care  H & H stable   US RUE 11/27: a resolving hematoma in the right forearm, wound care consulted.     Consultants/Specialty  ID   Infectious disease    Code Status  Full Code    Disposition  TBD   Needs placement for long term IV Abx    Review of Systems  Review of Systems   Constitutional: Negative for chills, fever and malaise/fatigue.   HENT: Negative for congestion, ear discharge, ear pain, sinus pain and sore throat.    Eyes: Negative for blurred vision and double vision.   Respiratory: Positive for cough, shortness of breath and wheezing. Negative for sputum production.    Cardiovascular: Positive for leg swelling. Negative for chest pain and palpitations.   Gastrointestinal: Negative for abdominal pain, constipation, diarrhea, nausea and vomiting.   Genitourinary: Negative for dysuria, frequency and urgency.   Musculoskeletal: Positive for joint pain. Negative for myalgias.   Neurological: Positive for dizziness and weakness. Negative for focal weakness and headaches.   Psychiatric/Behavioral: The patient is  not nervous/anxious.         Physical Exam  Temp:  [36.3 °C (97.3 °F)-37 °C (98.6 °F)] 36.9 °C (98.4 °F)  Pulse:  [71-98] 98  Resp:  [18-22] 22  BP: ()/(49-68) 120/64  SpO2:  [93 %-98 %] 94 %    Physical Exam  Constitutional:       General: He is not in acute distress.     Appearance: He is obese.   HENT:      Head: Normocephalic and atraumatic.      Nose: Nose normal.      Mouth/Throat:      Mouth: Mucous membranes are moist.      Pharynx: No posterior oropharyngeal erythema.   Eyes:      General: No scleral icterus.     Extraocular Movements: Extraocular movements intact.      Conjunctiva/sclera: Conjunctivae normal.      Pupils: Pupils are equal, round, and reactive to light.   Neck:      Musculoskeletal: Normal range of motion and neck supple. No neck rigidity.   Cardiovascular:      Rate and Rhythm: Normal rate. Rhythm irregular.      Pulses: Normal pulses.      Heart sounds: No murmur. No gallop.    Pulmonary:      Effort: Pulmonary effort is normal.      Breath sounds: Normal breath sounds. No stridor. No wheezing, rhonchi or rales.   Abdominal:      General: Bowel sounds are normal. There is distension.      Palpations: Abdomen is soft.      Tenderness: There is no abdominal tenderness. There is no guarding.   Musculoskeletal:         General: Swelling present. No tenderness.      Comments: Left AKA amputation  S/p I&D on left AKA wound, wound VAC in placed    RUE erythema & crusted areas   Skin:     General: Skin is warm.      Comments: Chronic venous stasis changes on her right lower extremity   Neurological:      General: No focal deficit present.      Mental Status: He is alert and oriented to person, place, and time.   Psychiatric:         Mood and Affect: Mood normal.         Behavior: Behavior normal.         Fluids    Intake/Output Summary (Last 24 hours) at 11/28/2020 1128  Last data filed at 11/28/2020 1000  Gross per 24 hour   Intake 2108 ml   Output 525 ml   Net 1583 ml        Laboratory  Recent Labs     11/27/20  0417 11/28/20  0101   WBC 9.1  --    RBC 3.15*  --    HEMOGLOBIN 9.2* 9.4*   HEMATOCRIT 31.7* 32.3*   .6*  --    MCH 29.2  --    MCHC 29.0*  --    RDW 72.5*  --    PLATELETCT 244  --    MPV 9.9  --      Recent Labs     11/27/20  0417   SODIUM 138   POTASSIUM 4.0   CHLORIDE 102   CO2 31   GLUCOSE 110*   BUN 12   CREATININE 0.95   CALCIUM 8.8     Recent Labs     11/26/20  1434 11/27/20  0417 11/28/20  0101   INR 1.60* 1.69* 1.82*               Imaging  US-EXTREMITY VENOUS UPPER UNILAT LEFT   Final Result      CT-EXTREMITY, LOWER WITH LEFT   Final Result      Fluid collection along the distal posterior aspect of the above the knee amputation measuring 7.8 x 2.3 x 5.3 cm without significant peripheral enhancement to suggest abscess.      Extensive left thigh subcutaneous edema.      CT-ABDOMEN WITH & W/O   Final Result      Resolution of cystic pancreas lesion most consistent with pancreatic pseudocyst.      Cirrhotic appearing liver with splenomegaly.      Nonobstructive right renal stone.      Possible gallstones            US-EXTREMITY ARTERY LOWER UNILAT LEFT   Final Result      US-EXTREMITY VENOUS LOWER BILAT   Final Result      DX-CHEST-LIMITED (1 VIEW)   Final Result      Moderate bilateral interstitial opacities which likely represents pulmonary edema.      Stable cardiac silhouette enlargement.           Assessment/Plan  * Soft tissue infection  Assessment & Plan  s/p initial left intitial knee amputation on 11/2/20 with Dr. Durbin, followed by completion of left AKA on 11/12.  Patient left [facility] AMA on 11/17  Presented to [facility] on 11/20 with purulent drainage from wound vac insertion site, with wound vac removal at outside hospital.   - Infectious Disease and orthopedic Sx on board  - on cefazolin 2g IV Q8H based on prior sensitivities  -I&D by orthopedic Sx on 11/26, wound VAC placed  -Pain control    11/28:D on board, continue Ancef x 6 weeks. But if the  patient refuses, recommend p.o. Augmentin 875 mg twice daily for 6 weeks, along with outpatient wound care  Needs placement for long term IV Abx      Paroxysmal atrial fibrillation (HCC)- (present on admission)  Assessment & Plan   Continue Amiodarone 200mg QAM & metoprolol   [id number] =3    INR 2.01 on 11/24  Restarted heparin drip after the orthopedic procedure & now back on Warfarin     COPD (chronic obstructive pulmonary disease) ([facility])- (present on admission)  Assessment & Plan  - COPD exacerbation on admission  - PFTS from 2018 revealed FVC 37% predicted.  FEV1 35% predicted.  No   significant response to bronchodilators.  FEV1/FVC is 73. Total lung capacity 5.29,     Diffusion DLCO 41% predicted indicating mixed obstructive and restrictive disease  - wheezing on exam and c/o shortness of breath  -Chest x-ray showed pulmonary edema  -Oxygen per protocol  -RT protocol with inhalers  - acapella treatment  - will avoid systemic steroids at this time due to active soft tissue infection      Pulmonary HTN ([facility])  Assessment & Plan  Estimated right ventricular systolic pressure  is 40 mmHg on echo on 11/2  - likely secondary to CHF, COPD and SHASHANK  - continue to manage COPD and CHF exacerbation      Acute exacerbation of CHF (congestive heart failure) ([facility])- (present on admission)  Assessment & Plan  PMH of CAD and NSTEMI  Patient recently admitted to [facility] found to have CHF exacerbation  - Last echo on 11/2  notable for LVEF 50%, Mild aortic stenosis,   Toprol 25mg QAM  Rosuvastatin 40mg daily  Lasix 40mg daily  Aldactone 25mg daily  Daily weights and I/Os    Type 2 diabetes mellitus with complication, without long-term current use of insulin ([facility])- (present on admission)  Assessment & Plan  - A1c 7.8 on 10/30/2020  - ISS with hypoglycemia protocol  - DM diet  -Titrating insulin for better glycemic control    Hematoma of arm, right, initial encounter  Assessment & Plan  US RUE 11/27: a resolving hematoma in  the right forearm  Wound care consulted.     SHASHANK (obstructive sleep apnea)- (present on admission)  Assessment & Plan  CPAP per RT    CKD (chronic kidney disease), stage III- (present on admission)  Assessment & Plan  - continue to monitor  - Creatinine at baseline at 0.69    Essential hypertension- (present on admission)  Assessment & Plan  Continue home medications with holding parameters       VTE prophylaxis: SCDs & Warfarin

## 2020-11-28 NOTE — PROGRESS NOTES
Report received from day shift nurse. Patient A/Ox4. Bed locked in lowest position. Call light and belongings in reach. Hourly rounding in place.

## 2020-11-28 NOTE — CARE PLAN
Problem: Communication  Goal: The ability to communicate needs accurately and effectively will improve  Outcome: PROGRESSING AS EXPECTED  Intervention: Educate patient and significant other/support system about the plan of care, procedures, treatments, medications and allow for questions  Note: POC discussed. Questions answered     Problem: Safety  Goal: Will remain free from falls  Outcome: PROGRESSING AS EXPECTED  Intervention: Implement fall precautions  Flowsheets (Taken 11/27/2020 2115)  Environmental Precautions:   Treaded Slipper Socks on Patient   Personal Belongings, Wastebasket, Call Bell etc. in Easy Reach   Transferred to Stronger Side   Report Given to Other Health Care Providers Regarding Fall Risk   Bed in Low Position   Communication Sign for Patients & Families   Mobility Assessed & Appropriate Sign Placed  Note: Bed locked in lowest position. Call light and belongings in reach. Hourly rounding in place.

## 2020-11-28 NOTE — WOUND TEAM
[facility]  Inpatient Services  Initial Wound and Skin Care Evaluation    Admission Date:  11/24/2020   HPI, PMH, SH: Reviewed  Unit where seen by Wound Team: R325/00    WOUND CONSULT RELATED TO:  VAC change     SUBJECTIVE:  Need to have more pain medication      Self Report / Pain Level:  10/10. Premedicated orally and IV by RN       OBJECTIVE:      WOUND TYPE, LOCATION, CHARACTERISTICS (Pressure ulcers: location, stage, POA or date identified)     Negative Pressure Wound Therapy 11/26/20 Surgical Thigh (Active)   NPWT Pump Mode / Pressure Setting 125 mmHg;Continuous 11/28/20 1100   Dressing Type Small;Black Foam (Regular) 11/28/20 1100   Number of Foam Pieces Used 2 11/28/20 1100   Canister Changed No 11/28/20 1100         Wound 11/26/20 Full Thickness Wound Thigh Lateral open surgical site (Active)   Wound Image   11/28/20   Site Assessment Red;Bleeding    Periwound Assessment Intact    Margins Defined edges    Closure None    Drainage Amount Moderate    Drainage Description Sanguineous    Treatments Cleansed    Wound Cleansing Normal Saline Irrigation    Periwound Protectant Skin Protectant Wipes to Periwound;Drape    Dressing Options Wound Vac    Dressing Changed Changed    Dressing Change/Treatment Frequency Tuesday, Thursday, Saturday, and As Needed    NEXT Dressing Change/Treatment Date 12/01/20    NEXT Weekly Photo (Inpatient Only) 12/05/20    Non-staged Wound Description Full thickness    Wound Length (cm) 6.5 cm Measured 11/28/20   Wound Width (cm) 2 cm    Wound Depth (cm) 2 cm    Wound Surface Area (cm^2) 13 cm^2    Wound Volume (cm^3) 26 cm^3    Tunneling (cm) 5.5 cm    Tunneling Clock Position of Wound 6    Wound Odor None    Exposed Structures None         Vascular: N/A    Lab Values     Results for JARRETT WHITE (MRN [id number]) as of 11/28/2020 11:30   Ref. Range 11/27/2020 04:17 11/28/2020 01:01   WBC Latest Ref Range: 4.8 - 10.8 K/uL 9.1    RBC Latest Ref Range: 4.70 - 6.10 M/uL  3.15 (L)    Hemoglobin Latest Ref Range: 14.0 - 18.0 g/dL 9.2 (L) 9.4 (L)   Hematocrit Latest Ref Range: 42.0 - 52.0 % 31.7 (L) 32.3 (L)     AIC:      Lab Results   Component Value Date/Time    HBA1C 7.8 (H) 10/30/2020 06:53 AM         Culture:   Completed 11/26/20    INTERVENTIONS BY WOUND TEAM:  Had RN ask hospitalist for IV pain med. Soaked foam with NS prior to removal. Measured and photographed wound. Cleaned with NS. Applied skin prep and drape to periwound skin. One strip of foam into wound bed and tract, the one piece for TRAC pad. Sealed with drape. Resumed VAC at 125 mmHg continuous.    Interdisciplinary consultation:  RN, patient     EVALUATION:  Able to see white tissue at base. After removing foam from 6:00 track, wound was bleeding. RN reported Heparin just stopped. Instructed her to watch cannister, and if filling quickly, call the surgeon. Patient has reaction to having had a BP cuff left on the L upper arm all night, with redness and flaking lesions. Nursing to apply Aquaphor ointment q shift. If not resolved by next VAC change, may recommend TAC cream.     Factors affecting wound healing:    Past Medical History:   Past Medical History was reviewed with patient.   has a past medical history of ASTHMA, Atrial fibrillation (HCC), CAD (coronary artery disease), Chronic obstructive pulmonary disease (HCC), Congestive heart failure (HCC), and Diabetes.     Past Surgical History: Past Surgical History was reviewed with patient.   has a past surgical history that includes other abdominal surgery; multiple coronary artery bypass endo vein harvest (12/22/2017); oscar (12/22/2017); thoracoscopy (Left, 1/25/2018); colonoscopy - endo (N/A, 7/25/2018); knee amputation below (Left, 11/4/2020); and knee amputation above (Left, 11/12/2020).     Goals:  Steady decrease in wound area and depth weekly     NURSING PLAN OF CARE ORDERS (X):    Dressing changes: See Dressing Care orders:   X  Skin care: See Skin Care  orders:   Rectal tube care: See Rectal Tube Care orders:   Other orders:    RSKIN: CURRENT (X) ORDERED (O)  Q shift Kirit:  X  Q shift pressure point assessments:  X  Pressure redistribution mattress   x     Waffle overlay  JOHN      Bariatric JOHN      Bariatric foam        Heel float boots       Heels floated on pillows      Barrier wipes      Barrier Cream      Barrier paste      Sacral silicone dressing      Silicone O2 tubing      Anchorfast      Trach with Optifoam split foam       Waffle cushion      Rectal tube or BMS      Antifungal tx    Turn q 2 hours   Assist  Up to chair     Ambulate   PT/OT     Dietician      PO X    TF   TPN   NPO   # days   Other       WOUND TEAM PLAN OF CARE (X):   NPWT change 3 x week:    X    Dressing changes by wound team:       Follow up as needed:       Other (explain):    Anticipated discharge plans (X):  SNF:           Home Care:    X       Outpatient Wound Center:            Self Care:            Other:

## 2020-11-28 NOTE — PROGRESS NOTES
Complaining of pain in LUE. Painful, swollen, and red. IV checked, flushes easily w/ no resistance + blood return. Cap refill > 3 seconds. Dark red roshni around upper arm with yellow crust, pt states it is from BP cuff. Per MD ok to order US of LUE. Orders read back to MD.

## 2020-11-29 ENCOUNTER — APPOINTMENT (OUTPATIENT)
Dept: RADIOLOGY | Facility: MEDICAL CENTER | Age: 55
DRG: 565 | End: 2020-11-29
Attending: STUDENT IN AN ORGANIZED HEALTH CARE EDUCATION/TRAINING PROGRAM
Payer: MEDICARE

## 2020-11-29 LAB
GLUCOSE BLD-MCNC: 117 MG/DL (ref 65–99)
GLUCOSE BLD-MCNC: 133 MG/DL (ref 65–99)
GLUCOSE BLD-MCNC: 153 MG/DL (ref 65–99)
INR PPP: 1.95 (ref 0.87–1.13)
PROTHROMBIN TIME: 22.8 SEC (ref 12–14.6)
UFH PPP CHRO-ACNC: <0.1 IU/ML

## 2020-11-29 PROCEDURE — 99232 SBSQ HOSP IP/OBS MODERATE 35: CPT | Performed by: INTERNAL MEDICINE

## 2020-11-29 PROCEDURE — 700102 HCHG RX REV CODE 250 W/ 637 OVERRIDE(OP): Performed by: STUDENT IN AN ORGANIZED HEALTH CARE EDUCATION/TRAINING PROGRAM

## 2020-11-29 PROCEDURE — 700102 HCHG RX REV CODE 250 W/ 637 OVERRIDE(OP): Performed by: INTERNAL MEDICINE

## 2020-11-29 PROCEDURE — 700101 HCHG RX REV CODE 250: Performed by: FAMILY MEDICINE

## 2020-11-29 PROCEDURE — A9270 NON-COVERED ITEM OR SERVICE: HCPCS | Performed by: STUDENT IN AN ORGANIZED HEALTH CARE EDUCATION/TRAINING PROGRAM

## 2020-11-29 PROCEDURE — 36415 COLL VENOUS BLD VENIPUNCTURE: CPT

## 2020-11-29 PROCEDURE — 85610 PROTHROMBIN TIME: CPT

## 2020-11-29 PROCEDURE — A9270 NON-COVERED ITEM OR SERVICE: HCPCS | Performed by: INTERNAL MEDICINE

## 2020-11-29 PROCEDURE — 770009 HCHG ROOM/CARE - ONCOLOGY SEMI PRI*

## 2020-11-29 PROCEDURE — 700111 HCHG RX REV CODE 636 W/ 250 OVERRIDE (IP): Performed by: STUDENT IN AN ORGANIZED HEALTH CARE EDUCATION/TRAINING PROGRAM

## 2020-11-29 PROCEDURE — 82962 GLUCOSE BLOOD TEST: CPT | Mod: 91

## 2020-11-29 PROCEDURE — 94640 AIRWAY INHALATION TREATMENT: CPT

## 2020-11-29 PROCEDURE — 85520 HEPARIN ASSAY: CPT

## 2020-11-29 PROCEDURE — 94760 N-INVAS EAR/PLS OXIMETRY 1: CPT

## 2020-11-29 PROCEDURE — 99232 SBSQ HOSP IP/OBS MODERATE 35: CPT | Performed by: STUDENT IN AN ORGANIZED HEALTH CARE EDUCATION/TRAINING PROGRAM

## 2020-11-29 PROCEDURE — 94669 MECHANICAL CHEST WALL OSCILL: CPT

## 2020-11-29 RX ORDER — FLUCONAZOLE 100 MG/1
400 TABLET ORAL DAILY
Status: DISCONTINUED | OUTPATIENT
Start: 2020-11-29 | End: 2020-11-30

## 2020-11-29 RX ORDER — IPRATROPIUM BROMIDE AND ALBUTEROL SULFATE 2.5; .5 MG/3ML; MG/3ML
3 SOLUTION RESPIRATORY (INHALATION)
Status: DISCONTINUED | OUTPATIENT
Start: 2020-11-29 | End: 2020-12-08 | Stop reason: HOSPADM

## 2020-11-29 RX ADMIN — WARFARIN SODIUM 7.5 MG: 7.5 TABLET ORAL at 17:53

## 2020-11-29 RX ADMIN — Medication: at 12:48

## 2020-11-29 RX ADMIN — ROSUVASTATIN CALCIUM 40 MG: 20 TABLET, FILM COATED ORAL at 05:49

## 2020-11-29 RX ADMIN — CEFAZOLIN SODIUM 2 G: 2 INJECTION, SOLUTION INTRAVENOUS at 05:55

## 2020-11-29 RX ADMIN — OXYCODONE HYDROCHLORIDE 10 MG: 10 TABLET ORAL at 00:48

## 2020-11-29 RX ADMIN — OXYCODONE HYDROCHLORIDE 5 MG: 5 TABLET ORAL at 22:32

## 2020-11-29 RX ADMIN — DOCUSATE SODIUM 50 MG AND SENNOSIDES 8.6 MG 2 TABLET: 8.6; 5 TABLET, FILM COATED ORAL at 05:51

## 2020-11-29 RX ADMIN — METOPROLOL SUCCINATE 25 MG: 25 TABLET, EXTENDED RELEASE ORAL at 05:54

## 2020-11-29 RX ADMIN — OXYCODONE HYDROCHLORIDE 10 MG: 10 TABLET ORAL at 17:53

## 2020-11-29 RX ADMIN — QUETIAPINE FUMARATE 12.5 MG: 25 TABLET ORAL at 05:52

## 2020-11-29 RX ADMIN — IPRATROPIUM BROMIDE AND ALBUTEROL SULFATE 3 ML: 2.5; .5 SOLUTION RESPIRATORY (INHALATION) at 11:27

## 2020-11-29 RX ADMIN — ALPRAZOLAM 0.5 MG: 0.5 TABLET ORAL at 12:48

## 2020-11-29 RX ADMIN — AMIODARONE HYDROCHLORIDE 200 MG: 200 TABLET ORAL at 05:52

## 2020-11-29 RX ADMIN — SPIRONOLACTONE 25 MG: 25 TABLET ORAL at 05:54

## 2020-11-29 RX ADMIN — BUDESONIDE AND FORMOTEROL FUMARATE DIHYDRATE 2 PUFF: 80; 4.5 AEROSOL RESPIRATORY (INHALATION) at 05:55

## 2020-11-29 RX ADMIN — FERROUS SULFATE TAB 325 MG (65 MG ELEMENTAL FE) 325 MG: 325 (65 FE) TAB at 05:52

## 2020-11-29 RX ADMIN — IPRATROPIUM BROMIDE AND ALBUTEROL SULFATE 3 ML: 2.5; .5 SOLUTION RESPIRATORY (INHALATION) at 07:27

## 2020-11-29 RX ADMIN — BUDESONIDE AND FORMOTEROL FUMARATE DIHYDRATE 2 PUFF: 80; 4.5 AEROSOL RESPIRATORY (INHALATION) at 17:53

## 2020-11-29 RX ADMIN — CEFAZOLIN SODIUM 2 G: 2 INJECTION, SOLUTION INTRAVENOUS at 22:32

## 2020-11-29 RX ADMIN — OXYCODONE HYDROCHLORIDE 10 MG: 10 TABLET ORAL at 12:48

## 2020-11-29 RX ADMIN — INSULIN HUMAN 1 UNITS: 100 INJECTION, SOLUTION PARENTERAL at 17:50

## 2020-11-29 RX ADMIN — DOCUSATE SODIUM 50 MG AND SENNOSIDES 8.6 MG 2 TABLET: 8.6; 5 TABLET, FILM COATED ORAL at 17:53

## 2020-11-29 RX ADMIN — FLUCONAZOLE 400 MG: 100 TABLET ORAL at 12:47

## 2020-11-29 RX ADMIN — FUROSEMIDE 40 MG: 40 TABLET ORAL at 05:53

## 2020-11-29 ASSESSMENT — ENCOUNTER SYMPTOMS
SORE THROAT: 0
SPUTUM PRODUCTION: 0
CHILLS: 0
FOCAL WEAKNESS: 0
NERVOUS/ANXIOUS: 0
SHORTNESS OF BREATH: 1
COUGH: 1
HEADACHES: 0
VOMITING: 0
CONSTIPATION: 0
MYALGIAS: 0
DIZZINESS: 1
FEVER: 0
MYALGIAS: 1
DIARRHEA: 0
BLURRED VISION: 0
ABDOMINAL PAIN: 0
PALPITATIONS: 0
DOUBLE VISION: 0
WHEEZING: 1
NAUSEA: 0
SINUS PAIN: 0
WEAKNESS: 1

## 2020-11-29 ASSESSMENT — PAIN DESCRIPTION - PAIN TYPE
TYPE: ACUTE PAIN
TYPE: ACUTE PAIN

## 2020-11-29 NOTE — PROGRESS NOTES
Inpatient Anticoagulation Service Note    Date: 11/29/2020    Reason for Anticoagulation: Atrial Fibrillation   Target INR: 2.0 to 3.0    [id number] VASc Score: 6  HAS-BLED Score: 1     Hemoglobin Value: (!) 9.4  Hematocrit Value: (!) 32.3  Lab Platelet Value: 244    INR from last 7 days     Date/Time INR Value    11/29/20 0234  (!) 1.95    11/28/20 0101  (!) 1.82    11/27/20 0417  (!) 1.69    11/26/20 1434  (!) 1.6    11/25/20 0558  (!) 1.84    11/25/20 0133  (!) 1.97    11/24/20 2331  (!) 2.01        Dose from last 7 days     Date/Time Dose (mg)    11/29/20 1800  7.5    11/28/20 1800  5    11/27/20 1800  7.5    11/26/20 1800  10        HPI: 53 yo male admitted on 11/24 with left AKA wound infection s/p I&D with ortho on 11/26/20 cultures growing yeast. ID consulting, on Cefazolin and Fluconazole. Patient is chronically anticoagulated with warfarin therapy for h/o paroxysmal Afib. Warfarin is managed outpatient by the [facility] and per records, patient is prescribed Warfarin 7.5 mg po every Sun, Tue, Thurs and Warfarin 5 mg po on all other days.      Assessment: INR continues trend upward to goal.   · Potential drug-drug interactions identified with acute inpatient medications: Antibiotics - Cefazolin - not likely to have a profound effect on INR. Fluconazole initiated 11/29 and is likely to increase INR.   · Potential drug-drug interactions identified with chronic home medications: Amiodarone and Crestor which should be established interactions with warfarin therapy at this point, although patient does have a documented history of non-compliance with medications.   · Inpatient Diet: Consistent CHO, Cardiac      Plan:  Continue usual home dosing regimen for now - Warfarin 7.5 mg tonight. Continue daily INR monitoring d/t new potential DDI.      Education Material Provided?: No (Chronic Warfarin Patient)     Pharmacist suggested discharge dosing: Warfarin 7.5 mg po every Sun, Tue, Thurs and Warfarin 5 mg  po on all other days     Isla Arcos, PharmD, BCPS

## 2020-11-29 NOTE — PROGRESS NOTES
Inpatient Anticoagulation Service Note    Date: 11/28/2020    Reason for Anticoagulation: Atrial Fibrillation   Target INR: 2.0 to 3.0  [id number] VASc Score: 6  HAS-BLED Score: 1   Hemoglobin Value: (!) 9.4  Hematocrit Value: (!) 32.3  Lab Platelet Value: 244    INR from last 7 days     Date/Time INR Value    11/28/20 0101  (!) 1.82    11/27/20 0417  (!) 1.69    11/26/20 1434  (!) 1.6    11/25/20 0558  (!) 1.84    11/25/20 0133  (!) 1.97    11/24/20 2331  (!) 2.01        Dose from last 7 days     Date/Time Dose (mg)    11/28/20 1018  5    11/27/20 1419  7.5    11/26/20 1524  10          HPI: 53 yo male admitted on 11/24 with left AKA wound infection s/p I&D with ortho on 11/26/20 cultures growing yeast. ID consulting, on Cefazolin currently based on previous cultures. Patient is chronically anticoagulated with warfarin therapy for h/o paroxysmal Afib. Warfarin is managed outpatient by the [facility] and per records, patient is prescribed Warfarin 7.5 mg po every Sun, Tue, Thurs and Warfarin 5 mg po on all other days.     Assessment: INR trending upward to goal.   • Potential drug-drug interactions identified with acute inpatient medications: Antibiotics - Cefazolin - not likely to have a profound effect on INR.   • Potential drug-drug interactions identified with chronic home medications: Amiodarone and Crestor which should be established interactions with warfarin therapy at this point, although patient does have a documented history of non-compliance with medications.   • Inpatient Diet: Consistent CHO, Cardiac     Plan:  Continue usual home dosing regimen. Heparin drip discontinued. Continue daily INR monitoring for now.     Education Material Provided?: No (Chronic Warfarin Patient)    Pharmacist suggested discharge dosing: Warfarin 7.5 mg po every Sun, Tue, Thurs and Warfarin 5 mg po on all other days     Ilsa Arcos PharmD, BCPS

## 2020-11-29 NOTE — PROGRESS NOTES
[facility] Medicine Daily Progress Note    Date of Service  11/29/2020    Chief Complaint  54 y.o. male admitted 11/24/2020 with left AKA wound infection     Hospital Course  A 54 y.o. male with PMH of CAD, DM, COPD, CHF, and A-fib who had recent left AKA on 11/12 who left AMA from hospital, admitted on 11/24/20 as a transfer from [place], [place] for left AKA wound infection and shortness of breath secondary to CHF. ID & Ortho on board. On cefazolin 2g IV Q8H based on prior sensitivities. Ortho I & D left AKA wound on 11/26.     Interval Problem Update  Patient was seen and examined at bedside. Having his breakfast.   S/p  Ortho I & D left AKA wound on 11/26. Wound VAC placed left lower extremity.  Per Ortho: Wt bearing status - NWB LLE  ID on board, continue Ancef x 6 weeks. But if the patient refuses, recommend p.o. Augmentin 875 mg twice daily for 6 weeks, along with outpatient wound care  H & H stable   US RUE 11/27: a resolving hematoma in the right forearm, wound care consulted.     Pt needs placement for long term IV ABx. Pt's answers are very tangential when asked about medication compliance and discharge planning. Needs help from Case Mx.     Consultants/Specialty  ID   Infectious disease    Code Status  Full Code    Disposition  TBD   Needs placement for long term IV Abx    Review of Systems  Review of Systems   Constitutional: Negative for chills, fever and malaise/fatigue.   HENT: Negative for congestion, ear discharge, ear pain, sinus pain and sore throat.    Eyes: Negative for blurred vision and double vision.   Respiratory: Positive for cough, shortness of breath and wheezing. Negative for sputum production.    Cardiovascular: Positive for leg swelling. Negative for chest pain and palpitations.   Gastrointestinal: Negative for abdominal pain, constipation, diarrhea, nausea and vomiting.   Genitourinary: Negative for dysuria, frequency and urgency.   Musculoskeletal: Positive for joint pain. Negative  for myalgias.   Neurological: Positive for dizziness and weakness. Negative for focal weakness and headaches.   Psychiatric/Behavioral: The patient is not nervous/anxious.         Physical Exam  Temp:  [36.9 °C (98.4 °F)-37.1 °C (98.8 °F)] 36.9 °C (98.5 °F)  Pulse:  [70-98] 70  Resp:  [18-22] 20  BP: (105-127)/(57-84) 105/57  SpO2:  [93 %-96 %] 94 %    Physical Exam  Constitutional:       General: He is not in acute distress.     Appearance: He is obese.   HENT:      Head: Normocephalic and atraumatic.      Nose: Nose normal.      Mouth/Throat:      Mouth: Mucous membranes are moist.      Pharynx: No posterior oropharyngeal erythema.   Eyes:      General: No scleral icterus.     Extraocular Movements: Extraocular movements intact.      Conjunctiva/sclera: Conjunctivae normal.      Pupils: Pupils are equal, round, and reactive to light.   Neck:      Musculoskeletal: Normal range of motion and neck supple. No neck rigidity.   Cardiovascular:      Rate and Rhythm: Normal rate. Rhythm irregular.      Pulses: Normal pulses.      Heart sounds: No murmur. No gallop.    Pulmonary:      Effort: Pulmonary effort is normal.      Breath sounds: Normal breath sounds. No stridor. No wheezing, rhonchi or rales.   Abdominal:      General: Bowel sounds are normal. There is distension.      Palpations: Abdomen is soft.      Tenderness: There is no abdominal tenderness. There is no guarding.   Musculoskeletal:         General: Swelling present. No tenderness.      Comments: Left AKA amputation  S/p I&D on left AKA wound, wound VAC in placed    RUE erythema & crusted areas   Skin:     General: Skin is warm.      Comments: Chronic venous stasis changes on her right lower extremity   Neurological:      General: No focal deficit present.      Mental Status: He is alert and oriented to person, place, and time.   Psychiatric:         Mood and Affect: Mood normal.         Behavior: Behavior normal.         Fluids  No intake or output data in  the 24 hours ending 11/29/20 1022    Laboratory  Recent Labs     11/27/20  0417 11/28/20  0101   WBC 9.1  --    RBC 3.15*  --    HEMOGLOBIN 9.2* 9.4*   HEMATOCRIT 31.7* 32.3*   .6*  --    MCH 29.2  --    MCHC 29.0*  --    RDW 72.5*  --    PLATELETCT 244  --    MPV 9.9  --      Recent Labs     11/27/20  0417   SODIUM 138   POTASSIUM 4.0   CHLORIDE 102   CO2 31   GLUCOSE 110*   BUN 12   CREATININE 0.95   CALCIUM 8.8     Recent Labs     11/27/20  0417 11/28/20  0101 11/29/20  0234   INR 1.69* 1.82* 1.95*               Imaging  US-EXTREMITY VENOUS UPPER UNILAT LEFT   Final Result      CT-EXTREMITY, LOWER WITH LEFT   Final Result      Fluid collection along the distal posterior aspect of the above the knee amputation measuring 7.8 x 2.3 x 5.3 cm without significant peripheral enhancement to suggest abscess.      Extensive left thigh subcutaneous edema.      CT-ABDOMEN WITH & W/O   Final Result      Resolution of cystic pancreas lesion most consistent with pancreatic pseudocyst.      Cirrhotic appearing liver with splenomegaly.      Nonobstructive right renal stone.      Possible gallstones            US-EXTREMITY ARTERY LOWER UNILAT LEFT   Final Result      US-EXTREMITY VENOUS LOWER BILAT   Final Result      DX-CHEST-LIMITED (1 VIEW)   Final Result      Moderate bilateral interstitial opacities which likely represents pulmonary edema.      Stable cardiac silhouette enlargement.           Assessment/Plan  * Soft tissue infection  Assessment & Plan  s/p initial left intitial knee amputation on 11/2/20 with Dr. Durbin, followed by completion of left AKA on 11/12.  Patient left [facility] AMA on 11/17  Presented to [place] on 11/20 with purulent drainage from wound vac insertion site, with wound vac removal at outside hospital.   - Infectious Disease and orthopedic Sx on board  - on cefazolin 2g IV Q8H based on prior sensitivities  -I&D by orthopedic Sx on 11/26, wound VAC placed  -Pain control    11/28:I ID on board,  continue Ancef x 6 weeks. But if the patient refuses, recommend p.o. Augmentin 875 mg twice daily for 6 weeks, along with outpatient wound care  Needs placement for long term IV Abx      Paroxysmal atrial fibrillation (HCC)- (present on admission)  Assessment & Plan   Continue Amiodarone 200mg QAM & metoprolol   [id number] =3    INR 2.01 on 11/24  Restarted heparin drip after the orthopedic procedure & now back on Warfarin     COPD (chronic obstructive pulmonary disease) ([facility])- (present on admission)  Assessment & Plan  - COPD exacerbation on admission  - PFTS from 2018 revealed FVC 37% predicted.  FEV1 35% predicted.  No   significant response to bronchodilators.  FEV1/FVC is 73. Total lung capacity 5.29,     Diffusion DLCO 41% predicted indicating mixed obstructive and restrictive disease  - wheezing on exam and c/o shortness of breath  -Chest x-ray showed pulmonary edema  -Oxygen per protocol  -RT protocol with inhalers  - Acapella treatment  - will avoid systemic steroids at this time due to active soft tissue infection      Pulmonary HTN ([facility])  Assessment & Plan  Estimated right ventricular systolic pressure  is 40 mmHg on echo on 11/2  - likely secondary to CHF, COPD and SHASHANK  - continue to manage COPD and CHF exacerbation      Acute exacerbation of CHF (congestive heart failure) ([facility])- (present on admission)  Assessment & Plan  PMH of CAD and NSTEMI  Patient recently admitted to [facility] found to have CHF exacerbation  - Last echo on 11/2  notable for LVEF 50%, Mild aortic stenosis,   Toprol 25mg QAM  Rosuvastatin 40mg daily  Lasix 40mg daily  Aldactone 25mg daily  Daily weights and I/Os    Type 2 diabetes mellitus with complication, without long-term current use of insulin ([facility])- (present on admission)  Assessment & Plan  - A1c 7.8 on 10/30/2020  - ISS with hypoglycemia protocol  - DM diet  -Titrating insulin for better glycemic control    Hematoma of arm, right, initial encounter  Assessment &  Plan  US RUE 11/27: a resolving hematoma in the right forearm  Wound care consulted.     SHASHANK (obstructive sleep apnea)- (present on admission)  Assessment & Plan  CPAP per RT    CKD (chronic kidney disease), stage III- (present on admission)  Assessment & Plan  - continue to monitor  - Creatinine at baseline at 0.69    Essential hypertension- (present on admission)  Assessment & Plan  Continue home medications with holding parameters       VTE prophylaxis: SCDs & Warfarin

## 2020-11-29 NOTE — PROGRESS NOTES
Infectious Disease Progress Note    Author: Evgeny Hsieh M.D. Date & Time of service: 2020  8:19 AM    Chief Complaint:  AKA stump fluid collection    Interval History:   afebrile, white count 9.1, taken to the OR yesterday and underwent I&D, gross appearance of liquefied hematoma was noted, cultures obtained.   afebrile, no CBC today.  Multiple OR cultures growing Yeast thus far.    Labs Reviewed and Medications Reviewed.    Review of Systems:  Review of Systems   Constitutional: Positive for malaise/fatigue. Negative for chills and fever.   Gastrointestinal: Negative for abdominal pain, diarrhea, nausea and vomiting.   Musculoskeletal: Positive for myalgias.   Skin: Negative for itching and rash.   All other systems reviewed and are negative.      Hemodynamics:  Temp (24hrs), Av °C (98.6 °F), Min:36.9 °C (98.4 °F), Max:37.1 °C (98.8 °F)  Temperature: 36.9 °C (98.4 °F)  Pulse  Av.3  Min: 66  Max: 98   Blood Pressure: 113/84       Physical Exam:  Physical Exam  Vitals signs and nursing note reviewed.   Constitutional:       Comments: Appears somewhat disheveled   HENT:      Head: Normocephalic and atraumatic.      Mouth/Throat:      Mouth: Mucous membranes are moist.   Eyes:      General: No scleral icterus.        Right eye: No discharge.         Left eye: No discharge.      Conjunctiva/sclera: Conjunctivae normal.   Cardiovascular:      Rate and Rhythm: Normal rate and regular rhythm.      Heart sounds: No murmur.   Pulmonary:      Effort: Pulmonary effort is normal. No respiratory distress.      Breath sounds: No wheezing.   Abdominal:      Tenderness: There is no abdominal tenderness.      Comments: Protuberant   Musculoskeletal:      Comments: Left AKA stump with wound VAC in place, surrounding swelling but no erythema or active discharge noted   Skin:     Findings: No erythema or rash.      Comments: Multiple tattoos  Chronic lower extremity venous stasis changes   Neurological:       General: No focal deficit present.      Mental Status: He is alert and oriented to person, place, and time.   Psychiatric:         Mood and Affect: Mood normal.         Behavior: Behavior normal.         Meds:    Current Facility-Administered Medications:   •  fluconazole  •  morphine injection  •  mineral oil-pet hydrophilic  •  budesonide-formoterol  •  warfarin  •  warfarin  •  ALPRAZolam  •  QUEtiapine  •  MD Alert...Warfarin per Pharmacy  •  insulin regular **AND** POC Blood Glucose **AND** NOTIFY MD and PharmD **AND** glucose **AND** dextrose 50%  •  acetaminophen  •  oxyCODONE immediate-release **OR** oxyCODONE immediate-release  •  pneumococcal 13-Par[place] Conj Vacc  •  influenza vaccine quad  •  ceFAZolin  •  ipratropium-albuterol  •  amiodarone  •  ferrous sulfate  •  furosemide  •  metoprolol SR  •  rosuvastatin  •  spironolactone  •  senna-docusate **AND** polyethylene glycol/lytes **AND** magnesium hydroxide **AND** bisacodyl  •  Respiratory Therapy Consult    Labs:  Recent Labs     11/27/20 0417 11/28/20  0101   WBC 9.1  --    RBC 3.15*  --    HEMOGLOBIN 9.2* 9.4*   HEMATOCRIT 31.7* 32.3*   .6*  --    MCH 29.2  --    RDW 72.5*  --    PLATELETCT 244  --    MPV 9.9  --    NEUTSPOLYS 79.70*  --    LYMPHOCYTES 7.70*  --    MONOCYTES 7.10  --    EOSINOPHILS 3.70  --    BASOPHILS 0.50  --      Recent Labs     11/27/20 0417   SODIUM 138   POTASSIUM 4.0   CHLORIDE 102   CO2 31   GLUCOSE 110*   BUN 12     Recent Labs     11/27/20 0417   ALBUMIN 2.9*   TBILIRUBIN 0.3   ALKPHOSPHAT 81   TOTPROTEIN 7.5   ALTSGPT <5   ASTSGOT 12   CREATININE 0.95       Imaging:  Ct-abdomen With & W/o    Result Date: 11/25/2020 11/25/2020 9:55 PM HISTORY/REASON FOR EXAM:  History of pancreas lesion. TECHNIQUE/EXAM DESCRIPTION:  CT pancreas and abdomen without and with contrast. Initial precontrast thick-section images were obtained through the pancreas.  Following this, nonionic contrast was administered by IV, and  thin-section helical scanning performed from the diaphragmatic domes through the iliac crests.  Pancreatic parenchymal phase and portal venous phase (dual-phase) images were obtained following contrast administration. 100 mL of Omnipaque 350 nonionic contrast was administered. Low dose optimization technique was utilized for this CT exam including automated exposure control and adjustment of the mA and/or kV according to patient size. COMPARISON: September 12, 2019 FINDINGS: There is bibasilar atelectasis with apparent trace right pleural fluid. There are old right rib fractures. There are coronary artery calcifications. The heart is enlarged. Initial noncontrast images reveal possible subtle gallstones dependently in the gallbladder. No pancreas calcifications identified. There is calcified plaque in the aorta and branch vessels. There is a nonobstructive right renal stone measuring 4 mm. With the administration of contrast the pancreas enhances homogeneously. The previously noted cystic structure has resolved most consistent with a pancreas pseudocyst. The pancreas is somewhat atrophic. The liver is heterogeneous with a nodular surface suggestive of cirrhosis. The spleen is enlarged measuring 19.6 cm anterior posteriorly. The main portal vein, splenic vein and superior mesenteric vein appear patent. There is a stable left adrenal gland nodule. The right adrenal gland is unremarkable. The kidneys enhance symmetrically. No hydronephrosis. No ascites or lymphadenopathy. The visualized bowel is nondilated. There is partial visualization of a ventral hernia containing fat.     Resolution of cystic pancreas lesion most consistent with pancreatic pseudocyst. Cirrhotic appearing liver with splenomegaly. Nonobstructive right renal stone. Possible gallstones     Ct-extremity, Lower W/o Right    Result Date: 10/31/2020  10/31/2020 3:13 PM HISTORY/REASON FOR EXAM:  Lower leg swelling/redness, cellulitis suspected.  TECHNIQUE/EXAM DESCRIPTION AND NUMBER OF VIEWS: CT scan of the RIGHT lower extremity without contrast and including reconstructions. Thin-section noncontrast helical images were obtained. Coronal and sagittal reconstructions were generated from the axial images. Up to date radiation dose reduction adjustments have been utilized to meet ALARA standards for radiation dose reduction. COMPARISON: X-ray tibia and fibula 10/29/2020 FINDINGS: There is joint space narrowing with sclerosis and osteophyte formation in the lateral compartment of the knee. No definite bony destruction is identified. There is marked edema in the subcutaneous tissues of the right lower leg with slight thickening of the overlying skin. There are varicosities. There is arterial calcification. No well-defined fluid collection is identified to suggest abscess. There is diffuse muscle atrophy.     1.  No discrete fluid collection is identified to suggest abscess. 2.  Diffuse subcutaneous edema and overlying skin thickening with venous varicosities. 3.  Atherosclerosis. 4.  Diffuse muscle atrophy.    Ct-extremity, Lower W/o Left    Result Date: 10/31/2020  10/31/2020 2:58 PM HISTORY/REASON FOR EXAM:  Lower leg swelling/redness, cellulitis suspected; Bilateral. TECHNIQUE/EXAM DESCRIPTION AND NUMBER OF VIEWS: CT scan of the LEFT lower extremity without contrast and including reconstructions. Thin-section noncontrast helical images were obtained. Coronal and sagittal reconstructions were generated from the axial images. Up to date radiation dose reduction adjustments have been utilized to meet ALARA standards for radiation dose reduction. COMPARISON: None. FINDINGS: Distal femur appears normal There is osteoarthritis of the left knee joint. Tibia and fibula are intact. There is no soft tissue gas. There is extensive atherosclerotic plaque There is diffuse muscular atrophy There is cutaneous and subcutaneous edema consistent with cellulitis. There are no  discrete fluid collection. There is osteoarthritis of the midfoot.     1.  Left leg cellulitis without abscess or soft tissue gas 2.  Small vessel atherosclerotic plaque 3.  osteoarthritis of the left knee and left midfoot 4.  Muscular atrophy    Ct-extremity, Lower With Left    Result Date: 11/25/2020 11/25/2020 9:54 PM HISTORY/REASON FOR EXAM:  Persistent wound drainage; Persistent wound drainage from left AKA, evaluate for deep fluid collection. TECHNIQUE/EXAM DESCRIPTION AND NUMBER OF VIEWS:  CT scan of the LEFT lower extremity with contrast, with reconstructions. Thin helical 3 mm sections were obtained from the distal femur through the proximal tibia/fibula. Sagittal and coronal multiplanar reconstructions were generated from the axial images. A total of 100 mL of Omnipaque 350 nonionic contrast was administered  IV without complication. Up to date radiation dose reduction adjustments have been utilized to meet ALARA standards for radiation dose reduction. COMPARISON: None. FINDINGS: There is left thigh cutaneous edema. There is a fluid collection along the posterior aspect of the above the knee amputation. This measures approximately 7.8 x 2.3 x 5.3 cm. No obvious peripheral enhancement identified to suggest infection. No soft tissue gas identified. There are varicose veins identified.     Fluid collection along the distal posterior aspect of the above the knee amputation measuring 7.8 x 2.3 x 5.3 cm without significant peripheral enhancement to suggest abscess. Extensive left thigh subcutaneous edema.    Dx-chest-limited (1 View)    Result Date: 11/25/2020 11/25/2020 6:02 AM HISTORY/REASON FOR EXAM:  Shortness of Breath TECHNIQUE/EXAM DESCRIPTION AND NUMBER OF VIEWS: Single portable view of the chest. COMPARISON: 11/1/2020 FINDINGS: Cardiomediastinal silhouette is stable. Aortic calcified atherosclerotic plaque. Sternotomy wires. There are diffuse interstitial opacities probably representing pulmonary  edema. Infection should be excluded clinically. No significant pleural effusion or pneumothorax. No acute osseous abnormality.     Moderate bilateral interstitial opacities which likely represents pulmonary edema. Stable cardiac silhouette enlargement.    Dx-chest-limited (1 View)    Result Date: 11/1/2020 11/1/2020 4:42 PM HISTORY/REASON FOR EXAM:  CVL placement. Central line placement TECHNIQUE/EXAM DESCRIPTION AND NUMBER OF VIEWS: Single AP view of the chest. COMPARISON:  1 view chest 10/31/2020 FINDINGS: Right internal jugular catheter has been placed. The tip projects appropriately over the superior vena cava. LUNGS: There are pulmonary interstitial and alveolar densities that are consistent with edema. HEART and MEDIASTINUM: enlarged. There has been previous sternotomy. Pleura: There are no pleural effusion or pneumothoraces. Osseous structures: No significant bony abnormality.     1.  Right internal jugular catheter is been placed and appears appropriately located 2.  Moderate pulmonary edema 3.  Enlarged cardiac silhouette    Dx-chest-portable (1 View)    Result Date: 10/31/2020    10/31/2020 6:58 AM HISTORY/REASON FOR EXAM: Pleural Effusion TECHNIQUE/EXAM DESCRIPTION:  Single AP view of the chest. COMPARISON: October 29, 2020 FINDINGS: Overlying cardiac leads are present. Cardiomegaly is observed.  Postsurgical changes of sternotomy are noted. The mediastinal contour appears within normal limits.  The central  pulmonary vasculature appears prominent and indistinct. The lungs appear well expanded bilaterally.  Diffuse scattered hazy pulmonary parenchymal opacities are seen. No significant pleural effusions are identified. The bony structures appear age-appropriate.     1.  Pulmonary edema and/or infiltrates are identified, which are stable since the prior exam. 2.  Cardiomegaly    Dx-chest-portable (1 View)    Result Date: 10/30/2020    10/29/2020 11:32 PM HISTORY/REASON FOR EXAM: Shortness of Breath  TECHNIQUE/EXAM DESCRIPTION:  Single AP view of the chest. COMPARISON: September 10, 2019 FINDINGS: Overlying cardiac leads are present. Cardiomegaly is observed.  Postsurgical changes of sternotomy are noted. The mediastinal contour appears within normal limits.  The central  pulmonary vasculature appears prominent and indistinct. The lungs appear well expanded bilaterally.  Hazy interstitial bilateral pulmonary opacities are seen. No significant pleural effusions are identified. The bony structures appear age-appropriate.     1.  Interstitial pulmonary parenchymal prominence, compatible with interstitial edema and/or infiltrates. 2.  Cardiomegaly    Dx-tibia And Fibula Left    Addendum Date: 10/30/2020    Addendum: Subtle clustered punctate lucencies lateral to the ankle are seen which could represent tiny foci of soft tissue gas. These findings were discussed with the patient's clinician, ELIZABETH HILARIO, on 10/30/2020 12:34 AM.    Result Date: 10/30/2020  10/29/2020 11:32 PM HISTORY/REASON FOR EXAM: Atraumatic Pain/Swelling/Deformity TECHNIQUE/EXAM DESCRIPTION:  AP and lateral views of the LEFT tibia and fibula. COMPARISON:  None FINDINGS: Tricompartmental degenerative changes of the knee are seen. Bony structures and articulations appear within normal limits without visualized fracture, subluxation, or dislocation. Atherosclerotic changes are seen. Soft tissue phleboliths are seen. Soft tissue edema is noted.     1.  No acute traumatic bony injury. 2.  Tricompartmental degenerative changes of the knee. 3.  Atherosclerosis    Dx-tibia And Fibula Right    Result Date: 10/30/2020  10/29/2020 11:32 PM HISTORY/REASON FOR EXAM: Atraumatic Pain/Swelling/Deformity TECHNIQUE/EXAM DESCRIPTION:  AP and lateral views of the RIGHT tibia and fibula. COMPARISON:  None FINDINGS: Tricompartmental degenerative changes of the knee are seen, otherwise the bony structures and articulations appear within normal limits without  visualized fracture, subluxation, or dislocation. Atherosclerotic changes are seen. Scattered soft tissue level associated.     1.  No acute traumatic bony injury. 2.  Tricompartmental degenerative changes of the knee 3.  Atherosclerosis    Us-extremity Artery Lower Bilat    Result Date: 10/30/2020  Lower Extremity  Arterial Duplex Report  Vascular Laboratory  CONCLUSIONS  1) Bilateral atherosclerosis  2) Right distal SFA 50-75% stenosis.  2) Monophasic waveforms in the left lower extremity  JARRETT WHITE  Exam Date:     10/30/2020 01:09  Room #:     Inpatient  Priority:     Stat  Ht (in):             Wt (lb):  Ordering Physician:        ELIZABETH HILARIO  Referring Physician:       ELIZABETH HILARIO  Sonographer:               Silvia Lassiter RVT, RDMS  Study Type:                Complete Bilateral  Technical Quality:         Adequate  Age:    54    Gender:     M  MRN:    [id number]  :    1965      BSA:  Indications:     Ulceration of LE  CPT Codes:       66558  ICD Codes:       707.1  History:         Nonhealing, raw and bleeding wounds bilaterally. Blue                   discoloration of limbs. Severe pain bilaterally.  Limitations:     Pain.                RIGHT  Waveform        Peak Systolic Velocity (cm/s)                  Prox    Prox-Mid  Mid    Mid-Dist  Distal  Triphasic                         107                      CFA  Triphasic       110                                        PFA  Triphasic       96                89      214      79      SFA  Biphasic                          44                       POP  Biphasic                                           49      AT  Not                                                        PT  attained  Not                                                        BRITTON  attained                LEFT  Waveform        Peak Systolic Velocity (cm/s)                  Prox    Prox-Mid  Mid    Mid-Dist  Distal  Monophasic                        137               109     CFA  Biphasic        67                                         PFA  Monophasic      98                125                      SFA                                                             POP                                                             AT                                                             PT                                                             BRITTON  FINDINGS  Right.  There is atherosclerotic plaque seen throughout the limb.  Stenosis of the distal femoral artery. Velocities are consistent with 50-  75% stenosis.  Waveforms at the common femoral, profunda femoral and femoral artery are  triphasic.  Waveforms at the popliteal and distal anterior tibial artery are biphasic.  The remaining vessels were not properly visualized evaluated due to edema,  severe pain in non-healing bleeding wounds and large body habitus.  Left.  There is atherosclerotic plaque seen throughout the limb.  Waveforms at the common femoral, profunda femoral and femoral artery are  monophasic.  The remaining vessels of the left lower limb were not properly  visualized/evaluated due to edema, severe pain in area of non-healing  bleeding wounds and large body habitus.  Nima Dill MD  (Electronically Signed)  Final Date:      30 October 2020                   03:22    Us-extremity Artery Lower Unilat Left    Result Date: 11/25/2020  Lower Extremity  Arterial Duplex Report  Vascular Laboratory  CONCLUSIONS  Prior study 10/30/20  Triphasic waveforms seen at the common femoral, profunda, and proximal-mid  femoral artery.  JARRETT WHITE  Exam Date:     11/25/2020 12:54  Room #:     Inpatient  Priority:     Routine  Ht (in):             Wt (lb):  Ordering Physician:        SYMONE NUÑEZ  Referring Physician:       [id number] Rooney  Sonographer:               Sonam Mccarty RVT,                             SONY  Study Type:                Complete Unilateral   Technical Quality:         Poor  Age:    54    Gender:     M  MRN:    [id number]  :    1965      BSA:  Indications:     Atherosclerosis of peripheral arteries  CPT Codes:       42232  ICD Codes:       440.20  History:         Left above the knee amputation. PVD. Prior study 10/30/20  Limitations:     Technically difficult study due to patient body habitus.                RIGHT  Waveform        Peak Systolic Velocity (cm/s)                  Prox    Prox-Mid  Mid    Mid-Dist  Distal                                                             CFA                                                             PFA                                                             SFA                                                             POP                                                             AT                                                             PT                                                             BRITTON                LEFT  Waveform        Peak Systolic Velocity (cm/s)                  Prox    Prox-Mid  Mid    Mid-Dist  Distal  Triphasic                         58                       CFA  Triphasic       49                                         PFA  Triphasic       60                54                       SFA                                                             POP                                                             AT                                                             PT                                                             BRITTON  FINDINGS  Left.  Triphasic waveforms seen at the common femoral, profunda, and proximal-mid  femoral artery.  Distal femoral artery not visualized.  Xaing Mendieta MD  (Electronically Signed)  Final Date:      2020                   15:25    Us-extremity Venous Lower Bilat    Result Date: 2020   Vascular Laboratory  CONCLUSIONS  No prior study is available for comparison.  Limited study, no obvious DVT.   JARRETT WHITE  Exam Date:     2020 12:43  Room #:     Inpatient  Priority:     Routine  Ht (in):             Wt (lb):  Ordering Physician:        SYMONE NUÑEZ  Referring Physician:       [id number] Hyatt  Sonographer:               Sonam Mccarty RVT, RDMS  Study Type:                Limited Bilateral  Technical Quality:         Poor  Age:    54    Gender:     M  MRN:    [id number]  :    1965      BSA:  Indications:     Edema  CPT Codes:       87342  ICD Codes:       782.3  History:         Edema  Limitations:     Technically difficult study due to patient body habitus.  PROCEDURES:  Bilateral lower extremity venous duplex imaging.  The following venous structures were evaluated: common femoral, profunda  femoral, prroximal greater saphenous, femoral, popliteal , peroneal and  posterior tibial veins.  *Left above the knee amputation.  FINDINGS:  Right lower extremity -  Patient refused compressions at the mid femoral, distal femoral, and  popliteal vein. Complete color filling visualized.  Calf veins not visualized.  All other veins of the right lower extremity demonstrate normal flow  dynamics with no evidence of deep vein thrombosis.  Left lower extremity -  Left above the knee amputation.  Patient refused compressions at the common femoral and distal femoral vein.  Complete color filling visualized.  All other veins of the left lower extremity demonstrate normal flow  dynamics with no evidence of deep vein thrombosis.  Xiang Mendieta MD  (Electronically Signed)  Final Date:      2020                   14:00    Us-extremity Venous Lower Bilat    Result Date: 10/30/2020   Vascular Laboratory  CONCLUSIONS  1) Normal bilateral superficial and deep venous examination of the lower  extremities.  2) Lower extremity edema, limits diagnostic sensitivity of this exam  JARRETT WHITE  Exam Date:     10/30/2020 00:46  Room #:      Inpatient  Priority:     Stat  Ht (in):             Wt (lb):  Ordering Physician:        ELIZABETH HILARIO  Referring Physician:       YANELIS Machuca  Sonographer:               Silvia Lassiter RVT, RDMS  Study Type:                Complete Bilateral  Technical Quality:         Adequate  Age:    54    Gender:     M  MRN:    [id number]  :    1965      BSA:  Indications:     Localized swelling, mass and lump, lower limb, bilateral,                   Edema, unspecified, Ulceration of LE  CPT Codes:       49542  ICD Codes:       R22.43  R60.9  707.1  History:         Swelling of bilateral lower limbs. Edema. Nonhealing, raw and                   bleeding wounds bilaterally.  Limitations:     Edema, large body habitus and severe pain in limbs at touch  PROCEDURES:  Bilateral lower extremity venous duplex imaging.  The following venous structures were evaluated: common femoral, profunda  femoral, proximal greater saphenous, femoral, popliteal , peroneal and  posterior tibial veins.  Serial compression, augmentation maneuvers,  color and spectral Doppler  flow evaluations were performed.  FINDINGS:  Bilateral lower extremities  No superficial or deep venous thrombosis.  Complete color filling and compressibility with normal venous flow dynamics  including spontaneous flow, response to augmentation maneuvers, and  respiratory phasicity.  The peroneal and posterior tibial veins are difficult to assess for  compressibility and flow by color flow due to severe pain in limbs through  the nonhealing bleeding wounds and edema.  Interstitial fluid consistent with edema is observed in the thigh and below  the knee.  Edema reduces the image quality.  Nima Dill MD  (Electronically Signed)  Final Date:      2020                   03:19    Ec-echocardiogram Complete W/o Cont    Result Date: 10/31/2020  Transthoracic Echo Report Echocardiography Laboratory CONCLUSIONS Poor quality echo,  consider repeating with contrast Probably normal LVEF Mild aortic stenosis. RVSP estimated at 30-35 mmHg. JARRETT WHITE Exam Date:         10/31/2020                    09:44 Exam Location:     Inpatient Priority:          Routine Ordering Physician:        MORIAH HUI Referring Physician: Sonographer:               Stalin Mtz RDCS Age:    54     Gender:    M MRN:    [id number] :    1965 BSA:    2.58   Ht (in):    77     Wt (lb):    280 Exam Type:     Complete Indications:     Heart Failure, Systolic ICD Codes:       428.2 CPT Codes:       53726 BP:   119    /   67     HR:   73 Technical Quality:       Technically difficult study                          incomplete information is                          obtained MEASUREMENTS  (Male / Female) Normal Values 2D ECHO LVOT Diameter                     2 cm                  DOPPLER AV Peak Velocity                  2.6 m/s               AV Peak Gradient                  27.9 mmHg             AV Mean Gradient                  17.7 mmHg             LVOT Peak Velocity                1.4 m/s               AV Area Cont Eq vti               1.6 cm2               MV Velocity Time Integral         41 cm                 Mitral E Point Velocity           1 m/s                 Mitral E to A Ratio               1.1                   Mitral A Duration                 96.9 ms               MV Pressure Half Time             102 ms                MV Area PHT                       2.2 cm2               MV Deceleration Time              352 ms                TR Peak Velocity                  258 cm/s              PV Peak Velocity                  1.3 m/s               PV Peak Gradient                  6.5 mmHg              PV Mean Gradient                  3.9 mmHg              * Indicates values subject to auto-interpretation LV EF:        % FINDINGS Left Ventricle Normal left ventricular chamber size. Normal left ventricular wall thickness. Unable to determine diastolic  function. Reliable ejection fraction estimate cannot be made due to technical limitation. Right Ventricle Right ventricle not well visualized. Right Atrium Dilated inferior vena cava with inspiratory collapse. Left Atrium Left atrium not well visualized. Mitral Valve Mitral annular calcification. No stenosis or regurgitation seen. Aortic Valve The aortic valve is not well visualized. Mild aortic stenosis. Vmax is 2.5  m/s. Transvalvular gradients are - Peak: 26 mmHg, Mean: 18 mmHg. No aortic insufficiency. Tricuspid Valve Structurally normal tricuspid valve. No stenosis or regurgitation seen. RVSP estimated at 30-35 mmHg Pulmonic Valve Structurally normal pulmonic valve. No pulmonic stenosis. Mild pulmonic insufficiency. Pericardium Normal pericardium without effusion. Aorta Normal aortic root for body surface area. Ascending aorta diameter is 3.2 cm. Quinton Bueno MD (Electronically Signed) Final Date:     2020                 12:02    [facility] W/ Cont    Result Date: 11/3/2020  Transthoracic Echo Report Echocardiography Laboratory CONCLUSIONS Compared to the images of the prior study done on 10/31/2020, no significant changes are noted. Left ventricular ejection fraction is visually estimated to be 50%. Mild aortic stenosis. Estimated right ventricular systolic pressure  is 40 mmHg. JARRETT WHITE Exam Date:         2020                    09:41 Exam Location:     Inpatient Priority:          Routine Ordering Physician:        QUINTON BUENO                             ([id number]) Referring Physician:       XIMENA De Jesus Sonographer:               Analia Swan RDCS Age:    54     Gender:    M MRN:    [id number] :    [id number] BSA:    2.84   Ht (in):    77     Wt (lb):    350 Exam Type:     Limited, Contrast Indications:     Congestive Heart Failure ICD Codes:       428 CPT Codes:       77527, , 37296, 47594 BP:   113    /   74     HR: Technical Quality:       Fair MEASUREMENTS   (Male / Female) Normal Values 2D ECHO Estimated LV Ejection Fraction    50 %                  IVC Diameter                      2.2 cm                DOPPLER AV Peak Velocity                  2.7 m/s               AV Peak Gradient                  30.1 mmHg             AV Mean Gradient                  18.1 mmHg             LVOT Peak Velocity                1.3 m/s               MV Velocity Time Integral         35.8 cm               MV Pressure Half Time             80 ms                 MV Area PHT                       2.8 cm2               TR Peak Velocity                  299 cm/s              * Indicates values subject to auto-interpretation LV EF:  50    % FINDINGS Left Ventricle 3 mL of contrast was administered. Existing IV was used. Contrast was used to enhance visualization of the endocardial border. Left ventricular systolic function is low normal. Left ventricular ejection fraction is visually estimated to be 50%. Right Ventricle Right Atrium Left Atrium Mitral Valve Mild mitral stenosis. Mean transvalvular gradient is 3  mmHg at a heart rate of 78  BPM. Aortic Valve Mild aortic stenosis. Vmax is  2.6 m/s. Transvalvular gradients are - Peak: 29 mmHg, Mean: 17 mmHg. Tricuspid Valve Mild tricuspid regurgitation. Right atrial pressure is estimated to be 3 mmHg. Estimated right ventricular systolic pressure  is 40 mmHg. Pulmonic Valve Pericardium Normal pericardium without effusion. Aorta Jerry Reyes MD (Electronically Signed) Final Date:     03 November 2020                 12:41      Micro:  Results     Procedure Component Value Units Date/Time    CULTURE TISSUE W/ GRM STAIN [[id number]]  (Abnormal) Collected: 11/26/20 1007    Order Status: Completed Specimen: Tissue Updated: 11/28/20 1232     Significant Indicator POS     Source TISS     Site Left Stump wound#2     Culture Result Growth noted after further incubation, see below for  organism identification.       Gram Stain Result Rare epithelial  cells.  No organisms seen.       Culture Result Yeast  Rare growth  Further incubation required      Narrative:      Surgery Specimen    Anaerobic Culture [[id number]] Collected: 11/26/20 1007    Order Status: Completed Specimen: Tissue Updated: 11/28/20 1232     Significant Indicator NEG     Source TISS     Site Left Stump wound#2     Culture Result Culture in progress.    Narrative:      Surgery Specimen    CULTURE WOUND W/ GRAM STAIN [[id number]]  (Abnormal) Collected: 11/26/20 1007    Order Status: Completed Specimen: Wound Updated: 11/28/20 1230     Significant Indicator POS     Source WND     Site Left Stump wound #1     Culture Result Growth noted after further incubation, see below for  organism identification.       Gram Stain Result Rare WBCs.  No organisms seen.       Culture Result Yeast  Rare growth  Further incubation required      Narrative:      Surgery - swabs received    Anaerobic Culture [[id number]] Collected: 11/26/20 1007    Order Status: Completed Specimen: Wound Updated: 11/28/20 1230     Significant Indicator NEG     Source WND     Site Left Stump wound #1     Culture Result Culture in progress.    Narrative:      Surgery - swabs received    GRAM STAIN [[id number]] Collected: 11/26/20 1007    Order Status: Completed Specimen: Tissue Updated: 11/26/20 1322     Significant Indicator .     Source TISS     Site Left Stump wound#2     Gram Stain Result Rare epithelial cells.  No organisms seen.      Narrative:      Surgery Specimen    GRAM STAIN [[id number]] Collected: 11/26/20 1007    Order Status: Completed Specimen: Wound Updated: 11/26/20 1321     Significant Indicator .     Source WND     Site Left Stump wound #1     Gram Stain Result Rare WBCs.  No organisms seen.      Narrative:      Surgery - swabs received    BLOOD CULTURE [[id number]] Collected: 11/25/20 1431    Order Status: Completed Specimen: Blood from Peripheral Updated: 11/26/20 0947     Significant Indicator NEG     Source BLD     Site  "PERIPHERAL     Culture Result No Growth  Note: Blood cultures are incubated for 5 days and  are monitored continuously.Positive blood cultures  are called to the RN and reported as soon as  they are identified.      Narrative:      Per Hospital Policy: Only change Specimen Src: to \"Line\" if  specified by physician order.  Right Hand    BLOOD CULTURE [[id number]] Collected: 11/25/20 1013    Order Status: Completed Specimen: Blood from Peripheral Updated: 11/26/20 0947     Significant Indicator NEG     Source BLD     Site PERIPHERAL     Culture Result No Growth  Note: Blood cultures are incubated for 5 days and  are monitored continuously.Positive blood cultures  are called to the RN and reported as soon as  they are identified.      Narrative:      Per Hospital Policy: Only change Specimen Src: to \"Line\" if  specified by physician order.  Right Hand    CoV-2, Flu A/B, And RSV by PCR [[id number]] Collected: 11/25/20 0140    Order Status: Completed Updated: 11/25/20 1238     Influenza virus A RNA Negative     Influenza virus B, PCR Negative     RSV, PCR Negative     SARS-CoV-2 by PCR NotDetected     Comment: PATIENTS: Important information regarding your results and instructions can  be found at [email or web address]   \"After your  Covid-19 Test\"  [facility] providers: PLEASE REFER TO DE-ESCALATION AND RETESTING PROTOCOL  on [email or web address]  **The [employer] GeneXpert Xpress SARS-CoV-2 Test has been made available for  use under the Emergency Use Authorization (EUA) only.          SARS-CoV-2 Source NP Swab    Narrative:      Collected By:[id number] JOCELYN PARTIDA  Is patient being admitted?->Yes  Does this patient meet criteria for Rush/Cepheid per [facility]  Inpatient Workflow? (See workflow link below)->No  Expected turn around time?->Routine (In-House PCR up to 24  hours)  Have you been in close contact with a person who is suspected  or known to be positive for COVID-19 within the last 30 " days  (e.g. last seen that person < 30 days ago)->Unknown    Routine (COVID/SARS COV-2 In-House PCR up to 24 hours) [[id number]] Collected: 11/25/20 0140    Order Status: Completed Specimen: Respirate from Nasopharyngeal Updated: 11/25/20 0225     COVID Order Status Received     Comment: The order for SARS CoV-2 testing has been received by the  Laboratory. This result is neither positive nor negative.  Final results of testing will report in 24-48 hours, separately.         Narrative:      Collected By:[id number] JOCELYN PARTIDA  Is patient being admitted?->Yes  Does this patient meet criteria for Rush/Cepheid per [facility]  Inpatient Workflow? (See workflow link below)->No  Expected turn around time?->Routine (In-House PCR up to 24  hours)  Have you been in close contact with a person who is suspected  or known to be positive for COVID-19 within the last 30 days  (e.g. last seen that person < 30 days ago)->Unknown    SARS-CoV-2, PCR (In-House) [[id number]] Collected: 11/25/20 0140    Order Status: Canceled           Assessment/Hospital Course:  54-year-old male with known atrial fibrillation, methamphetamine abuse, CAD, PVD, transferred from a hospital in [place] on 11/24/2020 following an admission for CHF exacerbation.  Earlier this month, patient was admitted here with group A strep bacteremia and bilateral lower extremity necrotizing cellulitis with cultures growing group A strep, MSSA.  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  During his admission at the [facility], reportedly some purulent drainage was noted from his poorly functioning wound VAC, thus he was transferred here.  CT scan showed a fluid collection along the distal posterior aspect of the AKA measuring 7.8 x 2.3 x 5.3 cm, this was not peripherally enhancing.  Per verbal report, purulent drainage was also noted at the amputation site here, prior to I&D. Patient was taken to the OR on 11/26 for I&D,  gross appearance of liquefied hematoma was noted.  Cultures are growing Yeast thus far    Plan:  -Reports of purulence at amputation site prior to I&D both here by my colleague as well as per reports at outside hospital acknowledged. Continue IV Ancef 2g q8h to cover previously grown organisms given likely infected hematoma  -Given proximity to residual femur, will likely treat with an extended course of antibiotics for 6 weeks  -Multiple OR cultures growing yeast, identification awaited.  Will add p.o. fluconazole 400 mg daily.  EKG reviewed and QTC is acceptable.  Note interaction with warfarin  -Given poor compliance and methamphetamine abuse, best course of action would be placement at a facility with continued wound care and IV Ancef + PO fluconazole for 6 weeks.  However, if patient refuses, recommend p.o. Augmentin 875 mg twice daily + PO fluconazole 400 mg daily for 6 weeks, along with outpatient wound care    Discussed with internal medicine, Dr. Sylvia BENITEZ will follow.  Please call with questions.

## 2020-11-29 NOTE — PROGRESS NOTES
"   Orthopaedic PA Progress Note    Interval changes:Did well overnight . Gram stain - no orgs. CX + yeast    ROS - Patient denies any new issues. No chest pain, dyspnea, or fever.  Pain well controlled.    /57   Pulse 70   Temp 36.9 °C (98.5 °F) (Temporal)   Resp 20   Ht 1.956 m (6' 5\")   Wt (!) 160.5 kg (353 lb 13.4 oz)   SpO2 94%     Patient seen and examined  No acute distress  Breathing non labored  RRR  LLE Vac intact, dressing intact,prox compartments soft    Recent Labs     11/27/20  0417 11/28/20  0101   WBC 9.1  --    RBC 3.15*  --    HEMOGLOBIN 9.2* 9.4*   HEMATOCRIT 31.7* 32.3*   .6*  --    MCH 29.2  --    MCHC 29.0*  --    RDW 72.5*  --    PLATELETCT 244  --    MPV 9.9  --        Active Hospital Problems    Diagnosis   • Soft tissue infection [L08.9]     Priority: High   • Paroxysmal atrial fibrillation (HCC) [I48.0]     Priority: High   • COPD (chronic obstructive pulmonary disease) ([facility]) [J44.9]     Priority: High   • Pulmonary HTN ([facility]) [I27.20]     Priority: Medium   • Acute exacerbation of CHF (congestive heart failure) ([facility]) [I50.9]     Priority: Medium   • Type 2 diabetes mellitus with complication, without long-term current use of insulin ([facility]) [E11.8]     Priority: Medium   • CKD (chronic kidney disease), stage III [N18.30]     Priority: Low   • SHASHANK (obstructive sleep apnea) [G47.33]     Priority: Low   • Essential hypertension [I10]     Priority: Low   • Hematoma of arm, right, initial encounter [S40.021A]       Assessment/Plan:  POD#3 S/P I+D LLE,  L AKA hx, application wound vac  Wt bearing status - NWB LLE  PT/OT-initiated  Wound care:wound care to follow, change vacs 3xWkly  to secondary closure  Drains - no  Ceballos-no  Sutures/Staples out- 10-14 days post operatively  Antibiotics: Ancef now, add AF per ID (Diflucan PO). Consider IV single dose as well  DVT Prophylaxis- TEDS/SCDs/Foot pumps.   Heparin gtt per Med  Future Procedures - not anticipated  Case Coordination for " Discharge Planning - Disposition pending Abx plan, wound care plan, likely Rehab vs. SNF vs LTAC. [place] Resident. Hx AMA departure and mult. Complaints re. Prior nursing care.

## 2020-11-30 ENCOUNTER — APPOINTMENT (OUTPATIENT)
Dept: RADIOLOGY | Facility: MEDICAL CENTER | Age: 55
DRG: 565 | End: 2020-11-30
Attending: STUDENT IN AN ORGANIZED HEALTH CARE EDUCATION/TRAINING PROGRAM
Payer: MEDICARE

## 2020-11-30 LAB
BACTERIA BLD CULT: NORMAL
BACTERIA BLD CULT: NORMAL
GLUCOSE BLD-MCNC: 109 MG/DL (ref 65–99)
GLUCOSE BLD-MCNC: 112 MG/DL (ref 65–99)
GLUCOSE BLD-MCNC: 123 MG/DL (ref 65–99)
GLUCOSE BLD-MCNC: 133 MG/DL (ref 65–99)
GLUCOSE BLD-MCNC: 140 MG/DL (ref 65–99)
INR PPP: 2.17 (ref 0.87–1.13)
PROTHROMBIN TIME: 24.8 SEC (ref 12–14.6)
SIGNIFICANT IND 70042: NORMAL
SIGNIFICANT IND 70042: NORMAL
SITE SITE: NORMAL
SITE SITE: NORMAL
SOURCE SOURCE: NORMAL
SOURCE SOURCE: NORMAL

## 2020-11-30 PROCEDURE — 700105 HCHG RX REV CODE 258: Performed by: INTERNAL MEDICINE

## 2020-11-30 PROCEDURE — 700102 HCHG RX REV CODE 250 W/ 637 OVERRIDE(OP): Performed by: STUDENT IN AN ORGANIZED HEALTH CARE EDUCATION/TRAINING PROGRAM

## 2020-11-30 PROCEDURE — 700111 HCHG RX REV CODE 636 W/ 250 OVERRIDE (IP): Performed by: STUDENT IN AN ORGANIZED HEALTH CARE EDUCATION/TRAINING PROGRAM

## 2020-11-30 PROCEDURE — 85610 PROTHROMBIN TIME: CPT

## 2020-11-30 PROCEDURE — A9270 NON-COVERED ITEM OR SERVICE: HCPCS | Performed by: STUDENT IN AN ORGANIZED HEALTH CARE EDUCATION/TRAINING PROGRAM

## 2020-11-30 PROCEDURE — 94640 AIRWAY INHALATION TREATMENT: CPT

## 2020-11-30 PROCEDURE — A9270 NON-COVERED ITEM OR SERVICE: HCPCS | Performed by: INTERNAL MEDICINE

## 2020-11-30 PROCEDURE — 36415 COLL VENOUS BLD VENIPUNCTURE: CPT

## 2020-11-30 PROCEDURE — 97605 NEG PRS WND THER DME<=50SQCM: CPT

## 2020-11-30 PROCEDURE — 700102 HCHG RX REV CODE 250 W/ 637 OVERRIDE(OP): Performed by: INTERNAL MEDICINE

## 2020-11-30 PROCEDURE — 700101 HCHG RX REV CODE 250: Performed by: STUDENT IN AN ORGANIZED HEALTH CARE EDUCATION/TRAINING PROGRAM

## 2020-11-30 PROCEDURE — 770009 HCHG ROOM/CARE - ONCOLOGY SEMI PRI*

## 2020-11-30 PROCEDURE — 97165 OT EVAL LOW COMPLEX 30 MIN: CPT

## 2020-11-30 PROCEDURE — 97162 PT EVAL MOD COMPLEX 30 MIN: CPT

## 2020-11-30 PROCEDURE — 99233 SBSQ HOSP IP/OBS HIGH 50: CPT | Performed by: INTERNAL MEDICINE

## 2020-11-30 PROCEDURE — 700111 HCHG RX REV CODE 636 W/ 250 OVERRIDE (IP): Performed by: INTERNAL MEDICINE

## 2020-11-30 PROCEDURE — 94760 N-INVAS EAR/PLS OXIMETRY 1: CPT

## 2020-11-30 PROCEDURE — 99232 SBSQ HOSP IP/OBS MODERATE 35: CPT | Performed by: STUDENT IN AN ORGANIZED HEALTH CARE EDUCATION/TRAINING PROGRAM

## 2020-11-30 PROCEDURE — 82962 GLUCOSE BLOOD TEST: CPT | Mod: 91

## 2020-11-30 RX ADMIN — OXYCODONE HYDROCHLORIDE 5 MG: 5 TABLET ORAL at 17:19

## 2020-11-30 RX ADMIN — Medication: at 22:45

## 2020-11-30 RX ADMIN — OXYCODONE HYDROCHLORIDE 5 MG: 5 TABLET ORAL at 06:26

## 2020-11-30 RX ADMIN — DOCUSATE SODIUM 50 MG AND SENNOSIDES 8.6 MG 2 TABLET: 8.6; 5 TABLET, FILM COATED ORAL at 17:19

## 2020-11-30 RX ADMIN — ROSUVASTATIN CALCIUM 40 MG: 20 TABLET, FILM COATED ORAL at 05:54

## 2020-11-30 RX ADMIN — CEFAZOLIN SODIUM 2 G: 2 INJECTION, SOLUTION INTRAVENOUS at 15:29

## 2020-11-30 RX ADMIN — FLUCONAZOLE 400 MG: 100 TABLET ORAL at 05:53

## 2020-11-30 RX ADMIN — AMIODARONE HYDROCHLORIDE 200 MG: 200 TABLET ORAL at 05:52

## 2020-11-30 RX ADMIN — FUROSEMIDE 40 MG: 40 TABLET ORAL at 05:54

## 2020-11-30 RX ADMIN — SPIRONOLACTONE 25 MG: 25 TABLET ORAL at 05:53

## 2020-11-30 RX ADMIN — DOCUSATE SODIUM 50 MG AND SENNOSIDES 8.6 MG 2 TABLET: 8.6; 5 TABLET, FILM COATED ORAL at 05:54

## 2020-11-30 RX ADMIN — OXYCODONE HYDROCHLORIDE 10 MG: 10 TABLET ORAL at 20:28

## 2020-11-30 RX ADMIN — CEFAZOLIN SODIUM 2 G: 2 INJECTION, SOLUTION INTRAVENOUS at 22:31

## 2020-11-30 RX ADMIN — BUDESONIDE AND FORMOTEROL FUMARATE DIHYDRATE 2 PUFF: 80; 4.5 AEROSOL RESPIRATORY (INHALATION) at 06:07

## 2020-11-30 RX ADMIN — METOPROLOL SUCCINATE 25 MG: 25 TABLET, EXTENDED RELEASE ORAL at 05:54

## 2020-11-30 RX ADMIN — WARFARIN SODIUM 5 MG: 5 TABLET ORAL at 17:19

## 2020-11-30 RX ADMIN — MICAFUNGIN SODIUM 100 MG: 20 INJECTION, POWDER, LYOPHILIZED, FOR SOLUTION INTRAVENOUS at 13:44

## 2020-11-30 RX ADMIN — QUETIAPINE FUMARATE 12.5 MG: 25 TABLET ORAL at 05:54

## 2020-11-30 RX ADMIN — CEFAZOLIN SODIUM 2 G: 2 INJECTION, SOLUTION INTRAVENOUS at 06:07

## 2020-11-30 RX ADMIN — Medication: at 10:31

## 2020-11-30 RX ADMIN — FERROUS SULFATE TAB 325 MG (65 MG ELEMENTAL FE) 325 MG: 325 (65 FE) TAB at 05:52

## 2020-11-30 RX ADMIN — MORPHINE SULFATE 1 MG: 4 INJECTION INTRAVENOUS at 13:44

## 2020-11-30 RX ADMIN — IPRATROPIUM BROMIDE AND ALBUTEROL SULFATE 3 ML: .5; 3 SOLUTION RESPIRATORY (INHALATION) at 17:20

## 2020-11-30 ASSESSMENT — PAIN DESCRIPTION - PAIN TYPE
TYPE: ACUTE PAIN

## 2020-11-30 ASSESSMENT — ENCOUNTER SYMPTOMS
SINUS PAIN: 0
SHORTNESS OF BREATH: 0
FOCAL WEAKNESS: 0
DOUBLE VISION: 0
MYALGIAS: 0
SORE THROAT: 0
DIZZINESS: 1
COUGH: 0
CHILLS: 0
PALPITATIONS: 0
NERVOUS/ANXIOUS: 0
WHEEZING: 1
SPUTUM PRODUCTION: 0
ABDOMINAL PAIN: 0
CONSTIPATION: 0
HEADACHES: 0
MYALGIAS: 1
SHORTNESS OF BREATH: 1
NAUSEA: 0
WEAKNESS: 1
DIARRHEA: 0
COUGH: 1
FEVER: 0
BLURRED VISION: 0
VOMITING: 0

## 2020-11-30 ASSESSMENT — COGNITIVE AND FUNCTIONAL STATUS - GENERAL
SUGGESTED CMS G CODE MODIFIER DAILY ACTIVITY: CK
CLIMB 3 TO 5 STEPS WITH RAILING: TOTAL
WALKING IN HOSPITAL ROOM: TOTAL
TOILETING: A LOT
HELP NEEDED FOR BATHING: A LOT
MOBILITY SCORE: 10
DRESSING REGULAR UPPER BODY CLOTHING: A LITTLE
PERSONAL GROOMING: A LITTLE
MOVING FROM LYING ON BACK TO SITTING ON SIDE OF FLAT BED: UNABLE
DAILY ACTIVITIY SCORE: 16
MOVING TO AND FROM BED TO CHAIR: A LITTLE
STANDING UP FROM CHAIR USING ARMS: A LOT
TURNING FROM BACK TO SIDE WHILE IN FLAT BAD: A LOT
DRESSING REGULAR LOWER BODY CLOTHING: A LOT
SUGGESTED CMS G CODE MODIFIER MOBILITY: CL

## 2020-11-30 ASSESSMENT — GAIT ASSESSMENTS: GAIT LEVEL OF ASSIST: UNABLE TO PARTICIPATE

## 2020-11-30 ASSESSMENT — ACTIVITIES OF DAILY LIVING (ADL): TOILETING: REQUIRES ASSIST

## 2020-11-30 NOTE — THERAPY
"Occupational Therapy   Initial Evaluation     Patient Name: Joshua Medrano  Age:  54 y.o., Sex:  male  Medical Record #: [id number]  Today's Date: 11/30/2020     Precautions: Fall Risk, Non Weight Bearing Left Lower Extremity  Comments: L AKA    Assessment  Patient is 54 y.o. male admitted with AKA wound infection, left hospital AMA following initial AKA surgery. Pt reports at home his wife, son, daughter, and son-in-law have been helping him, he reports \"the guys pick me up\" to transfer. Pt reports he does not use a walker and is unable to support himself with his right leg. Pt somewhat resistant to education, is verbose, demonstrates poor attention and new learning. Pt encouraged to DC to post-acute for additional training on safe transfers via slide board and to increase ADL independence prior to DC home. Pt reports he would only consider rehab in [place], [place]. Acute OT to follow while in-house.    Plan    Recommend Occupational Therapy 3 times per week until therapy goals are met for the following treatments:  Adaptive Equipment, Self Care/Activities of Daily Living, Therapeutic Activities and Therapeutic Exercises.    DC Equipment Recommendations: Grab Bar(s) in Tub / Shower, Grab Bar(s) by Toilet, Tub Transfer Bench, Other (Comments)(slide board)  Discharge Recommendations: Recommend post-acute placement for additional occupational therapy services prior to discharge home     Subjective    \"My son in law is 6'11\" and he just lifts me up.\"     Objective       11/30/20 0952   Prior Living Situation   Prior Services Continuous (24 Hour) Care Giving Family   Housing / Facility 1 Story Apartment / Condo   Steps Into Home 0   Steps In Home 0   Bathroom Set up Bathtub / Shower Combination   Equipment Owned Wheelchair;Power Wheel Chair   Lives with - Patient's Self Care Capacity Spouse;Adult Children   Comments reports plans to get tub transfer bench, wife and son-in-law are primary caregivers   Prior Level of ADL " Function   Comments assist for all ADLs, pt reports sponge bathes   Prior Level of IADL Function   Shopping Requires Assist   Prior Level Of Mobility Uses Wheel Chair for in Home Mobility;Uses Wheel Chair for Community Mobility   Driving / Transportation Relatives / Others Provide Transportation   Occupation (Pre-Hospital Vocational) Retired Due To Disability   Precautions   Precautions Fall Risk;Non Weight Bearing Left Lower Extremity   Comments L AKA   Pain 0 - 10 Group   Therapist Pain Assessment Post Activity Pain Same as Prior to Activity;Nurse Notified;0   Cognition    Speech/ Communication Dysarthric   Level of Consciousness Alert   New Learning Impaired   Attention Impaired   Comments resting face with tongue out of his mouth   Balance Assessment   Weight Shift Sitting Fair   Comments standing deferred at this time, pt reports his family lifts him to transfer   Bed Mobility    Supine to Sit Supervised   Sit to Supine Supervised   Scooting Supervised   ADL Assessment   Eating Modified Independent   Grooming Supervision;Seated   Upper Body Dressing Supervision   Lower Body Dressing Maximal Assist   Toileting   (not tested)   Functional Mobility   Sit to Stand Unable to Participate   Bed, Chair, Wheelchair Transfer   (deferred, no slide board or wheelchair present)   Mobility limited to bed mobility   Short Term Goals   Short Term Goal # 1 pt will demo slide board functional transfer with Imelda   Short Term Goal # 2 pt will complete toileting ADL with SPV   Education Group   Additional Comments edu on role of post-acute rehab to increase functional independence prior to home, reinforcement needed   Problem List   Problem List Decreased Active Daily Living Skills;Decreased Homemaking Skills;Decreased Functional Mobility;Safety Awareness Deficits / Cognition;Decreased Activity Tolerance   Anticipated Discharge Equipment and Recommendations   DC Equipment Recommendations Grab Bar(s) in Tub / Shower;Grab Bar(s) by  Toilet;Tub Transfer Bench;Other (Comments)  (slide board)   Discharge Recommendations Recommend post-acute placement for additional occupational therapy services prior to discharge home

## 2020-11-30 NOTE — DISCHARGE PLANNING
"Anticipated Discharge Disposition: TBD.     Action: Chart reviewed, SNF referral in, no current therapy recommendations documented.     Per Ortho note, \"Case Coordination for Discharge Planning - Disposition pending Abx plan, wound care plan, likely Rehab vs. SNF vs LTAC. [place]ident. Hx AMA departure and mult. Complaints re. Prior nursing care.\"      Barriers to Discharge: POC/GOC, disposition determination     Plan: RN CM to follow and assist. Follow up with disposition determination to assist with planning     "

## 2020-11-30 NOTE — PROGRESS NOTES
Infectious Disease Progress Note    Author: Carol Barber M.D. Date & Time of service: 2020  8:54 AM       Chief Complaint:  AKA stump fluid collection     Interval History:   afebrile, white count 9.1, taken to the OR yesterday and underwent I&D, gross appearance of liquefied hematoma was noted, cultures obtained.   afebrile, no CBC today.  Multiple OR cultures growing Yeast thus far.     Review of Systems:  Review of Systems   Constitutional: Negative for chills, fever and malaise/fatigue.   Respiratory: Negative for cough and shortness of breath.    Gastrointestinal: Negative for abdominal pain, constipation, diarrhea, nausea and vomiting.   Musculoskeletal: Positive for myalgias.   Skin: Positive for itching and rash.       Hemodynamics:  Temp (24hrs), Av.7 °C (98.1 °F), Min:36.7 °C (98 °F), Max:36.8 °C (98.2 °F)  Temperature: 36.7 °C (98 °F)  Pulse  Av.8  Min: 66  Max: 98   Blood Pressure: 104/68       Physical Exam:  Physical Exam  Constitutional:       Appearance: Normal appearance. He is obese.   Cardiovascular:      Rate and Rhythm: Normal rate and regular rhythm.      Heart sounds: Murmur present.   Pulmonary:      Effort: Pulmonary effort is normal.      Comments: Crackles and diminished breath sounds in right base  Abdominal:      General: Abdomen is flat. Bowel sounds are normal.      Palpations: Abdomen is soft.   Musculoskeletal:         General: Swelling and deformity present.      Comments: Left AKA, wound VAC in place   Skin:     General: Skin is warm.      Findings: Rash present.      Comments: Diffuse erythematous maculopapular rash, most prominent on back, pruritic and excoriations on arms   Neurological:      General: No focal deficit present.      Mental Status: He is alert and oriented to person, place, and time.   Psychiatric:         Mood and Affect: Mood normal.         Behavior: Behavior normal.         Meds:    Current Facility-Administered Medications:    •  fluconazole  •  ipratropium-albuterol  •  morphine injection  •  mineral oil-pet hydrophilic  •  budesonide-formoterol  •  warfarin  •  warfarin  •  ALPRAZolam  •  QUEtiapine  •  MD Alert...Warfarin per Pharmacy  •  insulin regular **AND** POC Blood Glucose **AND** NOTIFY MD and PharmD **AND** glucose **AND** dextrose 50%  •  acetaminophen  •  oxyCODONE immediate-release **OR** oxyCODONE immediate-release  •  pneumococcal 13-Paradise Conj Vacc  •  influenza vaccine quad  •  ceFAZolin  •  amiodarone  •  ferrous sulfate  •  furosemide  •  metoprolol SR  •  rosuvastatin  •  spironolactone  •  senna-docusate **AND** polyethylene glycol/lytes **AND** magnesium hydroxide **AND** bisacodyl  •  Respiratory Therapy Consult    Labs:  Recent Labs     11/28/20  0101   HEMOGLOBIN 9.4*   HEMATOCRIT 32.3*     No results for input(s): SODIUM, POTASSIUM, CHLORIDE, CO2, GLUCOSE, BUN, CPKTOTAL in the last 72 hours.  No results for input(s): ALBUMIN, TBILIRUBIN, ALKPHOSPHAT, TOTPROTEIN, ALTSGPT, ASTSGOT, CREATININE in the last 72 hours.    Imaging:  Ct-abdomen With & W/o    Result Date: 11/25/2020 11/25/2020 9:55 PM HISTORY/REASON FOR EXAM:  History of pancreas lesion. TECHNIQUE/EXAM DESCRIPTION:  CT pancreas and abdomen without and with contrast. Initial precontrast thick-section images were obtained through the pancreas.  Following this, nonionic contrast was administered by IV, and thin-section helical scanning performed from the diaphragmatic domes through the iliac crests.  Pancreatic parenchymal phase and portal venous phase (dual-phase) images were obtained following contrast administration. 100 mL of Omnipaque 350 nonionic contrast was administered. Low dose optimization technique was utilized for this CT exam including automated exposure control and adjustment of the mA and/or kV according to patient size. COMPARISON: September 12, 2019 FINDINGS: There is bibasilar atelectasis with apparent trace right pleural fluid. There  are old right rib fractures. There are coronary artery calcifications. The heart is enlarged. Initial noncontrast images reveal possible subtle gallstones dependently in the gallbladder. No pancreas calcifications identified. There is calcified plaque in the aorta and branch vessels. There is a nonobstructive right renal stone measuring 4 mm. With the administration of contrast the pancreas enhances homogeneously. The previously noted cystic structure has resolved most consistent with a pancreas pseudocyst. The pancreas is somewhat atrophic. The liver is heterogeneous with a nodular surface suggestive of cirrhosis. The spleen is enlarged measuring 19.6 cm anterior posteriorly. The main portal vein, splenic vein and superior mesenteric vein appear patent. There is a stable left adrenal gland nodule. The right adrenal gland is unremarkable. The kidneys enhance symmetrically. No hydronephrosis. No ascites or lymphadenopathy. The visualized bowel is nondilated. There is partial visualization of a ventral hernia containing fat.     Resolution of cystic pancreas lesion most consistent with pancreatic pseudocyst. Cirrhotic appearing liver with splenomegaly. Nonobstructive right renal stone. Possible gallstones     Ct-extremity, Lower W/o Right    Result Date: 10/31/2020  10/31/2020 3:13 PM HISTORY/REASON FOR EXAM:  Lower leg swelling/redness, cellulitis suspected. TECHNIQUE/EXAM DESCRIPTION AND NUMBER OF VIEWS: CT scan of the RIGHT lower extremity without contrast and including reconstructions. Thin-section noncontrast helical images were obtained. Coronal and sagittal reconstructions were generated from the axial images. Up to date radiation dose reduction adjustments have been utilized to meet ALARA standards for radiation dose reduction. COMPARISON: X-ray tibia and fibula 10/29/2020 FINDINGS: There is joint space narrowing with sclerosis and osteophyte formation in the lateral compartment of the knee. No definite bony  destruction is identified. There is marked edema in the subcutaneous tissues of the right lower leg with slight thickening of the overlying skin. There are varicosities. There is arterial calcification. No well-defined fluid collection is identified to suggest abscess. There is diffuse muscle atrophy.     1.  No discrete fluid collection is identified to suggest abscess. 2.  Diffuse subcutaneous edema and overlying skin thickening with venous varicosities. 3.  Atherosclerosis. 4.  Diffuse muscle atrophy.    Ct-extremity, Lower W/o Left    Result Date: 10/31/2020  10/31/2020 2:58 PM HISTORY/REASON FOR EXAM:  Lower leg swelling/redness, cellulitis suspected; Bilateral. TECHNIQUE/EXAM DESCRIPTION AND NUMBER OF VIEWS: CT scan of the LEFT lower extremity without contrast and including reconstructions. Thin-section noncontrast helical images were obtained. Coronal and sagittal reconstructions were generated from the axial images. Up to date radiation dose reduction adjustments have been utilized to meet ALARA standards for radiation dose reduction. COMPARISON: None. FINDINGS: Distal femur appears normal There is osteoarthritis of the left knee joint. Tibia and fibula are intact. There is no soft tissue gas. There is extensive atherosclerotic plaque There is diffuse muscular atrophy There is cutaneous and subcutaneous edema consistent with cellulitis. There are no discrete fluid collection. There is osteoarthritis of the midfoot.     1.  Left leg cellulitis without abscess or soft tissue gas 2.  Small vessel atherosclerotic plaque 3.  osteoarthritis of the left knee and left midfoot 4.  Muscular atrophy    Ct-extremity, Lower With Left    Result Date: 11/25/2020 11/25/2020 9:54 PM HISTORY/REASON FOR EXAM:  Persistent wound drainage; Persistent wound drainage from left AKA, evaluate for deep fluid collection. TECHNIQUE/EXAM DESCRIPTION AND NUMBER OF VIEWS:  CT scan of the LEFT lower extremity with contrast, with  reconstructions. Thin helical 3 mm sections were obtained from the distal femur through the proximal tibia/fibula. Sagittal and coronal multiplanar reconstructions were generated from the axial images. A total of 100 mL of Omnipaque 350 nonionic contrast was administered  IV without complication. Up to date radiation dose reduction adjustments have been utilized to meet ALARA standards for radiation dose reduction. COMPARISON: None. FINDINGS: There is left thigh cutaneous edema. There is a fluid collection along the posterior aspect of the above the knee amputation. This measures approximately 7.8 x 2.3 x 5.3 cm. No obvious peripheral enhancement identified to suggest infection. No soft tissue gas identified. There are varicose veins identified.     Fluid collection along the distal posterior aspect of the above the knee amputation measuring 7.8 x 2.3 x 5.3 cm without significant peripheral enhancement to suggest abscess. Extensive left thigh subcutaneous edema.    Dx-chest-limited (1 View)    Result Date: 11/25/2020 11/25/2020 6:02 AM HISTORY/REASON FOR EXAM:  Shortness of Breath TECHNIQUE/EXAM DESCRIPTION AND NUMBER OF VIEWS: Single portable view of the chest. COMPARISON: 11/1/2020 FINDINGS: Cardiomediastinal silhouette is stable. Aortic calcified atherosclerotic plaque. Sternotomy wires. There are diffuse interstitial opacities probably representing pulmonary edema. Infection should be excluded clinically. No significant pleural effusion or pneumothorax. No acute osseous abnormality.     Moderate bilateral interstitial opacities which likely represents pulmonary edema. Stable cardiac silhouette enlargement.    Dx-chest-limited (1 View)    Result Date: 11/1/2020 11/1/2020 4:42 PM HISTORY/REASON FOR EXAM:  CVL placement. Central line placement TECHNIQUE/EXAM DESCRIPTION AND NUMBER OF VIEWS: Single AP view of the chest. COMPARISON:  1 view chest 10/31/2020 FINDINGS: Right internal jugular catheter has been placed.  The tip projects appropriately over the superior vena cava. LUNGS: There are pulmonary interstitial and alveolar densities that are consistent with edema. HEART and MEDIASTINUM: enlarged. There has been previous sternotomy. Pleura: There are no pleural effusion or pneumothoraces. Osseous structures: No significant bony abnormality.     1.  Right internal jugular catheter is been placed and appears appropriately located 2.  Moderate pulmonary edema 3.  Enlarged cardiac silhouette    Us-extremity Artery Lower Unilat Left    Result Date: 2020  Lower Extremity  Arterial Duplex Report  Vascular Laboratory  CONCLUSIONS  Prior study 10/30/20  Triphasic waveforms seen at the common femoral, profunda, and proximal-mid  femoral artery.  JARRETT WHITE  Exam Date:     2020 12:54  Room #:     Inpatient  Priority:     Routine  Ht (in):             Wt (lb):  Ordering Physician:        SYMONE NUÑEZ  Referring Physician:       [id number] Hyatt  Sonographer:               Sonam Mccarty RVT, RDMS  Study Type:                Complete Unilateral  Technical Quality:         Poor  Age:    54    Gender:     M  MRN:    [id number]  :    1965      BSA:  Indications:     Atherosclerosis of peripheral arteries  CPT Codes:       72476  ICD Codes:       440.20  History:         Left above the knee amputation. PVD. Prior study 10/30/20  Limitations:     Technically difficult study due to patient body habitus.                RIGHT  Waveform        Peak Systolic Velocity (cm/s)                  Prox    Prox-Mid  Mid    Mid-Dist  Distal                                                             CFA                                                             PFA                                                             SFA                                                             POP                                                             AT                                                              PT                                                             [place]                LEFT  Waveform        Peak Systolic Velocity (cm/s)                  Prox    Prox-Mid  Mid    Mid-Dist  Distal  Triphasic                         58                       CFA  Triphasic       49                                         PFA  Triphasic       60                54                       SFA                                                             POP                                                             AT                                                             PT                                                             [place]  FINDINGS  Left.  Triphasic waveforms seen at the common femoral, profunda, and proximal-mid  femoral artery.  Distal femoral artery not visualized.  Xiang Mendieta MD  (Electronically Signed)  Final Date:      2020                   15:25    Us-extremity Venous Lower Bilat    Result Date: 2020   Vascular Laboratory  CONCLUSIONS  No prior study is available for comparison.  Limited study, no obvious DVT.  JARRETT WHITE  Exam Date:     2020 12:43  Room #:     Inpatient  Priority:     Routine  Ht (in):             Wt (lb):  Ordering Physician:        SYMONE NUÑEZ  Referring Physician:       [id number] Rooney  Sonographer:               Sonam Mccarty RVT, RDMS  Study Type:                Limited Bilateral  Technical Quality:         Poor  Age:    54    Gender:     M  MRN:    [id number]  :    1965      BSA:  Indications:     Edema  CPT Codes:       81662  ICD Codes:       782.3  History:         Edema  Limitations:     Technically difficult study due to patient body habitus.  PROCEDURES:  Bilateral lower extremity venous duplex imaging.  The following venous structures were evaluated: common femoral, profunda  femoral, prroximal greater saphenous,  femoral, popliteal , peroneal and  posterior tibial veins.  *Left above the knee amputation.  FINDINGS:  Right lower extremity -  Patient refused compressions at the mid femoral, distal femoral, and  popliteal vein. Complete color filling visualized.  Calf veins not visualized.  All other veins of the right lower extremity demonstrate normal flow  dynamics with no evidence of deep vein thrombosis.  Left lower extremity -  Left above the knee amputation.  Patient refused compressions at the common femoral and distal femoral vein.  Complete color filling visualized.  All other veins of the left lower extremity demonstrate normal flow  dynamics with no evidence of deep vein thrombosis.  Xiang Mendieta MD  (Electronically Signed)  Final Date:      2020                   14:00    Us-extremity Venous Upper Unilat Left    Result Date: 2020   Upper Extremity  Venous Duplex Report  Vascular Laboratory  CONCLUSIONS  No prior exam is available for comparison.  LEFT ARM  Subacute, partially occlusive superficial thrombus in the median cubital  vein.  No evidence of DVT.  JARRETT WHITE  Exam Date:     2020 05:37  Room #:     Inpatient  Priority:     Routine  Ht (in):             Wt (lb):  Ordering Physician:        WENDY PEARL  Referring Physician:       [id number] Cheng  Sonographer:               Feli Nice                             FADY, [facility]  Study Type:                Complete Unilateral  Technical Quality:         Adequate  Age:    54    Gender:     M  MRN:    [id number]  :    1965      BSA:  Indications:     Swelling of Limb, Pain in Limb  CPT Codes:       42088  ICD Codes:       729.81  729.5  History:         Left distal bicep pain, swelling, and erythema x 1 day  Limitations:  PROCEDURES:  Left upper extremity venous duplex imaging.  The following venous structures were evaluated: internal jugular,  subclavian, axillary, brachial, cephalic and basilic veins.  Serial  compression, augmentation maneuvers,  color and spectral Doppler  flow evaluations were performed.  FINDINGS:  Left upper extremity.  Subacute, partially occlusive superficial thrombus in the median cubital  vein.  All other veins demonstrate complete color filling and compressibility with  normal venous flow dynamics including spontaneous flow, response to  augmentation maneuvers, and respiratory phasicity.  No deep venous thrombosis.  Flow was evaluated in the contralateral subclavian vein and normal venous  flow dynamics including spontaneous flow, respiratory phasic variation and  augmentation were demonstrated.  Jerry Ellison MD  (Electronically Signed)  Final Date:      2020                   07:45    Ec-echocardiogram Complete W/o Cont    Result Date: 10/31/2020  Transthoracic Echo Report Echocardiography Laboratory CONCLUSIONS Poor quality echo, consider repeating with contrast Probably normal LVEF Mild aortic stenosis. RVSP estimated at 30-35 mmHg. JARRETT WHITE Exam Date:         10/31/2020                    09:44 Exam Location:     Inpatient Priority:          Routine Ordering Physician:        MORIAH HUI Referring Physician: Sonographer:               Stalin Mtz RDCS Age:    54     Gender:    M MRN:    [id number] :    1965 BSA:    2.58   Ht (in):    77     Wt (lb):    280 Exam Type:     Complete Indications:     Heart Failure, Systolic ICD Codes:       428.2 CPT Codes:       61704 BP:   119    /   67     HR:   73 Technical Quality:       Technically difficult study                          incomplete information is                          obtained MEASUREMENTS  (Male / Female) Normal Values 2D ECHO LVOT Diameter                     2 cm                  DOPPLER AV Peak Velocity                  2.6 m/s               AV Peak Gradient                  27.9 mmHg             AV Mean Gradient                  17.7 mmHg             LVOT Peak Velocity                1.4 m/s                AV Area Cont Eq vti               1.6 cm2               MV Velocity Time Integral         41 cm                 Mitral E Point Velocity           1 m/s                 Mitral E to A Ratio               1.1                   Mitral A Duration                 96.9 ms               MV Pressure Half Time             102 ms                MV Area PHT                       2.2 cm2               MV Deceleration Time              352 ms                TR Peak Velocity                  258 cm/s              PV Peak Velocity                  1.3 m/s               PV Peak Gradient                  6.5 mmHg              PV Mean Gradient                  3.9 mmHg              * Indicates values subject to auto-interpretation LV EF:        % FINDINGS Left Ventricle Normal left ventricular chamber size. Normal left ventricular wall thickness. Unable to determine diastolic function. Reliable ejection fraction estimate cannot be made due to technical limitation. Right Ventricle Right ventricle not well visualized. Right Atrium Dilated inferior vena cava with inspiratory collapse. Left Atrium Left atrium not well visualized. Mitral Valve Mitral annular calcification. No stenosis or regurgitation seen. Aortic Valve The aortic valve is not well visualized. Mild aortic stenosis. Vmax is 2.5  m/s. Transvalvular gradients are - Peak: 26 mmHg, Mean: 18 mmHg. No aortic insufficiency. Tricuspid Valve Structurally normal tricuspid valve. No stenosis or regurgitation seen. RVSP estimated at 30-35 mmHg Pulmonic Valve Structurally normal pulmonic valve. No pulmonic stenosis. Mild pulmonic insufficiency. Pericardium Normal pericardium without effusion. Aorta Normal aortic root for body surface area. Ascending aorta diameter is 3.2 cm. Quinton Parkinson MD (Electronically Signed) Final Date:     31 October 2020                 12:02    Ec-echocardiogram Ltd W/ Cont    Result Date: 11/3/2020  Transthoracic Echo Report Echocardiography  Laboratory CONCLUSIONS Compared to the images of the prior study done on 10/31/2020, no significant changes are noted. Left ventricular ejection fraction is visually estimated to be 50%. Mild aortic stenosis. Estimated right ventricular systolic pressure  is 40 mmHg. JARRETT WHITE Exam Date:         2020                    09:41 Exam Location:     Inpatient Priority:          Routine Ordering Physician:        BHAVIN BUENO                             ([id number]) Referring Physician:       XIMENA De Jesus Sonographer:               Analia Swan [facility] Age:    54     Gender:    M MRN:    [id number] :    1965 BSA:    2.84   Ht (in):    77     Wt (lb):    350 Exam Type:     Limited, Contrast Indications:     Congestive Heart Failure ICD Codes:       428 CPT Codes:       23451, , 69150, 29266 BP:   113    /   74     HR: Technical Quality:       Fair MEASUREMENTS  (Male / Female) Normal Values 2D ECHO Estimated LV Ejection Fraction    50 %                  IVC Diameter                      2.2 cm                DOPPLER AV Peak Velocity                  2.7 m/s               AV Peak Gradient                  30.1 mmHg             AV Mean Gradient                  18.1 mmHg             LVOT Peak Velocity                1.3 m/s               MV Velocity Time Integral         35.8 cm               MV Pressure Half Time             80 ms                 MV Area PHT                       2.8 cm2               TR Peak Velocity                  299 cm/s              * Indicates values subject to auto-interpretation LV EF:  50    % FINDINGS Left Ventricle 3 mL of contrast was administered. Existing IV was used. Contrast was used to enhance visualization of the endocardial border. Left ventricular systolic function is low normal. Left ventricular ejection fraction is visually estimated to be 50%. Right Ventricle Right Atrium Left Atrium Mitral Valve Mild mitral stenosis. Mean transvalvular gradient is 3  mmHg at a  heart rate of 78  BPM. Aortic Valve Mild aortic stenosis. Vmax is  2.6 m/s. Transvalvular gradients are - Peak: 29 mmHg, Mean: 17 mmHg. Tricuspid Valve Mild tricuspid regurgitation. Right atrial pressure is estimated to be 3 mmHg. Estimated right ventricular systolic pressure  is 40 mmHg. Pulmonic Valve Pericardium Normal pericardium without effusion. Aorta Jerry Reyes MD (Electronically Signed) Final Date:     03 November 2020                 12:41      Micro:  Results     Procedure Component Value Units Date/Time    CULTURE TISSUE W/ GRM STAIN [[id number]]  (Abnormal) Collected: 11/26/20 1007    Order Status: Completed Specimen: Tissue Updated: 11/29/20 0841     Significant Indicator POS     Source TISS     Site Left Stump wound#2     Culture Result Growth noted after further incubation, see below for  organism identification.       Gram Stain Result Rare epithelial cells.  No organisms seen.       Culture Result Yeast  Rare growth  Further incubation required      Narrative:      Surgery Specimen    Anaerobic Culture [[id number]] Collected: 11/26/20 1007    Order Status: Completed Specimen: Tissue Updated: 11/29/20 0841     Significant Indicator NEG     Source TISS     Site Left Stump wound#2     Culture Result Culture in progress.    Narrative:      Surgery Specimen    CULTURE WOUND W/ GRAM STAIN [[id number]]  (Abnormal) Collected: 11/26/20 1007    Order Status: Completed Specimen: Wound Updated: 11/29/20 0833     Significant Indicator POS     Source WND     Site Left Stump wound #1     Culture Result Growth noted after further incubation, see below for  organism identification.       Gram Stain Result Rare WBCs.  No organisms seen.       Culture Result Yeast  Rare growth  Further incubation required      Narrative:      Surgery - swabs received    Anaerobic Culture [[id number]] Collected: 11/26/20 1007    Order Status: Completed Specimen: Wound Updated: 11/29/20 0833     Significant Indicator NEG     Source  "WND     Site Left Stump wound #1     Culture Result Culture in progress.    Narrative:      Surgery - swabs received    GRAM STAIN [[id number]] Collected: 11/26/20 1007    Order Status: Completed Specimen: Tissue Updated: 11/26/20 1322     Significant Indicator .     Source TISS     Site Left Stump wound#2     Gram Stain Result Rare epithelial cells.  No organisms seen.      Narrative:      Surgery Specimen    GRAM STAIN [[id number]] Collected: 11/26/20 1007    Order Status: Completed Specimen: Wound Updated: 11/26/20 1321     Significant Indicator .     Source WND     Site Left Stump wound #1     Gram Stain Result Rare WBCs.  No organisms seen.      Narrative:      Surgery - swabs received    BLOOD CULTURE [[id number]] Collected: 11/25/20 1431    Order Status: Completed Specimen: Blood from Peripheral Updated: 11/26/20 0947     Significant Indicator NEG     Source BLD     Site PERIPHERAL     Culture Result No Growth  Note: Blood cultures are incubated for 5 days and  are monitored continuously.Positive blood cultures  are called to the RN and reported as soon as  they are identified.      Narrative:      Per Hospital Policy: Only change Specimen Src: to \"Line\" if  specified by physician order.  Right Hand    BLOOD CULTURE [[id number]] Collected: 11/25/20 1013    Order Status: Completed Specimen: Blood from Peripheral Updated: 11/26/20 0947     Significant Indicator NEG     Source BLD     Site PERIPHERAL     Culture Result No Growth  Note: Blood cultures are incubated for 5 days and  are monitored continuously.Positive blood cultures  are called to the RN and reported as soon as  they are identified.      Narrative:      Per Hospital Policy: Only change Specimen Src: to \"Line\" if  specified by physician order.  Right Hand    CoV-2, Flu A/B, And RSV by PCR [[id number]] Collected: 11/25/20 0140    Order Status: Completed Updated: 11/25/20 1238     Influenza virus A RNA Negative     Influenza virus B, PCR Negative     RSV, " "PCR Negative     SARS-CoV-2 by PCR NotDetected     Comment: PATIENTS: Important information regarding your results and instructions can  be found at [email or web address]   \"After your  Covid-19 Test\"  [facility] providers: PLEASE REFER TO DE-ESCALATION AND RETESTING PROTOCOL  on [email or web address]  **The [employer] GeneXpert Xpress SARS-CoV-2 Test has been made available for  use under the Emergency Use Authorization (EUA) only.          SARS-CoV-2 Source NP Swab    Narrative:      Collected By:[id number] JOCELYN PARTIDA  Is patient being admitted?->Yes  Does this patient meet criteria for Rush/Cepheid per [facility]  Inpatient Workflow? (See workflow link below)->No  Expected turn around time?->Routine (In-House PCR up to 24  hours)  Have you been in close contact with a person who is suspected  or known to be positive for COVID-19 within the last 30 days  (e.g. last seen that person < 30 days ago)->Unknown    Routine (COVID/SARS COV-2 In-House PCR up to 24 hours) [[id number]] Collected: 11/25/20 0140    Order Status: Completed Specimen: Respirate from Nasopharyngeal Updated: 11/25/20 0225     COVID Order Status Received     Comment: The order for SARS CoV-2 testing has been received by the  Laboratory. This result is neither positive nor negative.  Final results of testing will report in 24-48 hours, separately.         Narrative:      Collected By:[id number] JOCELYN PARTIDA  Is patient being admitted?->Yes  Does this patient meet criteria for Rush/Cepheid per [facility]  Inpatient Workflow? (See workflow link below)->No  Expected turn around time?->Routine (In-House PCR up to 24  hours)  Have you been in close contact with a person who is suspected  or known to be positive for COVID-19 within the last 30 days  (e.g. last seen that person < 30 days ago)->Unknown    SARS-CoV-2, PCR (In-House) [[id number]] Collected: 11/25/20 0140    Order Status: Canceled           Assessment:  Active Hospital Problems    " Diagnosis   • *Soft tissue infection [L08.9]   • Paroxysmal atrial fibrillation (HCC) [I48.0]   • COPD (chronic obstructive pulmonary disease) (HCC) [J44.9]   • Pulmonary HTN (HCC) [I27.20]   • Acute exacerbation of CHF (congestive heart failure) (HCC) [I50.9]   • Type 2 diabetes mellitus with complication, without long-term current use of insulin (HCC) [E11.8]   • CKD (chronic kidney disease), stage III [N18.30]   • SHASHANK (obstructive sleep apnea) [G47.33]   • Essential hypertension [I10]   • Hematoma of arm, right, initial encounter [S40.021A]     Interval 24 hours:      AF, O2 3 L NC, stable  Labs reviewed  Imaging personally reviewed both images and report.   Studies reviewed  Micro reviewed    Patient is reporting pruritus and some generalized malaise.  He is continued on cefazolin 2 g every 8 hours and will stop fluconazole given new onset rash and as per notes he has tolerated cephalosporins in the past.  Will transition fluconazole to micafungin and monitor.      Assessment/Hospital Course:  54-year-old male with known atrial fibrillation, methamphetamine abuse, CAD, PVD, transferred from a hospital in [place] on 11/24/2020 following an admission for CHF exacerbation.  Earlier this month, patient was admitted here with group A strep bacteremia and bilateral lower extremity necrotizing cellulitis with cultures growing group A strep, MSSA.  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  During his admission at the [facility], reportedly some purulent drainage was noted from his poorly functioning wound VAC, thus he was transferred here.  CT scan showed a fluid collection along the distal posterior aspect of the AKA measuring 7.8 x 2.3 x 5.3 cm, this was not peripherally enhancing.  Per verbal report, purulent drainage was also noted at the amputation site here, prior to I&D. Patient was taken to the OR on 11/26 for I&D, gross appearance of liquefied hematoma was  noted.  Upper extremity ultrasound on 11/28 with subacute partially occlusive superficial thrombus in the median cubital vein.    Left AKA stump infection  Rash- new  Antibiotic allergies-penicillin allergy reported but per chart has tolerated cephalosporins     Plan:  -Reports of purulence at amputation site prior to I&D both here by my colleague as well as per reports at outside hospital acknowledged. Continue IV Ancef 2g q8h to cover previously grown organisms given likely infected hematoma  -Given proximity to residual femur, will likely treat with an extended course of antibiotics for 6 weeks  -Multiple OR cultures growing yeast, identification awaited.    Due to new rash that appears to coincide with starting fluconazole will stop and transition to micafungin and monitor.    -Given poor compliance and methamphetamine abuse, best course of action would be placement at a facility with continued wound care and IV antibiotics.      Discussed with internal medicine, Dr. Ward.  ID will follow.  Please call with questions.

## 2020-11-30 NOTE — PROGRESS NOTES
"Hospital Medicine Daily Progress Note    Date of Service  11/30/2020    Chief Complaint  54 y.o. male admitted 11/24/2020 with left AKA wound infection     Hospital Course  A 54 y.o. male with PMH of CAD, DM, COPD, CHF, and A-fib who had recent left AKA on 11/12 who left AMA from hospital, admitted on 11/24/20 as a transfer from [place], [place] for left AKA wound infection and shortness of breath secondary to CHF. ID & Ortho on board. On cefazolin 2g IV Q8H based on prior sensitivities. Ortho I & D left AKA wound on 11/26. ID added fluconazole on 11/29 for  OR cultures growing yeast    Interval Problem Update  Patient was seen and examined at bedside. Pt stated that \" I can leave at hopsital any time I want\". He left AMA during last admission.   S/p  Ortho I & D left AKA wound on 11/26. Wound VAC placed left lower extremity.  Per Ortho: Wt bearing status - NWB LLE  ID on board, continue Ancef x 6 weeks. But if the patient refuses, recommend p.o. Augmentin 875 mg twice daily for 6 weeks, along with outpatient wound care  ID added fluconazole on 11/29 for  OR cultures growing yeast.   PICC ordered on 11/30.   On 11/30: ID stop fluconazole & started on Micafungin for concerning new rash.     Pt needs placement for long term IV ABx. Pt's answers are very tangential when asked about medication compliance and discharge planning. Needs help from Case Mx.     Consultants/Specialty  ID   Infectious disease    Code Status  Full Code    Disposition  TBD   Needs placement for long term IV Abx    Review of Systems  Review of Systems   Constitutional: Negative for chills, fever and malaise/fatigue.   HENT: Negative for congestion, ear discharge, ear pain, sinus pain and sore throat.    Eyes: Negative for blurred vision and double vision.   Respiratory: Positive for cough, shortness of breath and wheezing. Negative for sputum production.    Cardiovascular: Positive for leg swelling. Negative for chest pain and palpitations. "   Gastrointestinal: Negative for abdominal pain, constipation, diarrhea, nausea and vomiting.   Genitourinary: Negative for dysuria, frequency and urgency.   Musculoskeletal: Positive for joint pain. Negative for myalgias.   Neurological: Positive for dizziness and weakness. Negative for focal weakness and headaches.   Psychiatric/Behavioral: The patient is not nervous/anxious.         Physical Exam  Temp:  [36.7 °C (98 °F)-36.8 °C (98.2 °F)] 36.7 °C (98 °F)  Pulse:  [67-74] 70  Resp:  [18-20] 18  BP: (104-131)/(60-76) 104/68  SpO2:  [91 %-97 %] 91 %    Physical Exam  Constitutional:       General: He is not in acute distress.     Appearance: He is obese.   HENT:      Head: Normocephalic and atraumatic.      Nose: Nose normal.      Mouth/Throat:      Mouth: Mucous membranes are moist.      Pharynx: No posterior oropharyngeal erythema.   Eyes:      General: No scleral icterus.     Extraocular Movements: Extraocular movements intact.      Conjunctiva/sclera: Conjunctivae normal.      Pupils: Pupils are equal, round, and reactive to light.   Neck:      Musculoskeletal: Normal range of motion and neck supple. No neck rigidity.   Cardiovascular:      Rate and Rhythm: Normal rate. Rhythm irregular.      Pulses: Normal pulses.      Heart sounds: No murmur. No gallop.    Pulmonary:      Effort: Pulmonary effort is normal.      Breath sounds: Normal breath sounds. No stridor. No wheezing, rhonchi or rales.   Abdominal:      General: Bowel sounds are normal. There is distension.      Palpations: Abdomen is soft.      Tenderness: There is no abdominal tenderness. There is no guarding.   Musculoskeletal:         General: Swelling present. No tenderness.      Comments: Left AKA amputation  S/p I&D on left AKA wound, wound VAC in placed    RUE erythema & crusted areas   Skin:     General: Skin is warm.      Comments: Chronic venous stasis changes on her right lower extremity   Neurological:      General: No focal deficit present.       Mental Status: He is alert and oriented to person, place, and time.   Psychiatric:         Mood and Affect: Mood normal.         Behavior: Behavior normal.         Fluids    Intake/Output Summary (Last 24 hours) at 11/30/2020 1100  Last data filed at 11/29/2020 1800  Gross per 24 hour   Intake --   Output 1 ml   Net -1 ml       Laboratory  Recent Labs     11/28/20  0101   HEMOGLOBIN 9.4*   HEMATOCRIT 32.3*         Recent Labs     11/28/20  0101 11/29/20  0234 11/30/20  0059   INR 1.82* 1.95* 2.17*               Imaging  US-EXTREMITY VENOUS UPPER UNILAT LEFT   Final Result      CT-EXTREMITY, LOWER WITH LEFT   Final Result      Fluid collection along the distal posterior aspect of the above the knee amputation measuring 7.8 x 2.3 x 5.3 cm without significant peripheral enhancement to suggest abscess.      Extensive left thigh subcutaneous edema.      CT-ABDOMEN WITH & W/O   Final Result      Resolution of cystic pancreas lesion most consistent with pancreatic pseudocyst.      Cirrhotic appearing liver with splenomegaly.      Nonobstructive right renal stone.      Possible gallstones            US-EXTREMITY ARTERY LOWER UNILAT LEFT   Final Result      US-EXTREMITY VENOUS LOWER BILAT   Final Result      DX-CHEST-LIMITED (1 VIEW)   Final Result      Moderate bilateral interstitial opacities which likely represents pulmonary edema.      Stable cardiac silhouette enlargement.      IR-PICC LINE PLACEMENT W/ GUIDANCE > AGE 5    (Results Pending)        Assessment/Plan  * Soft tissue infection  Assessment & Plan  s/p initial left intitial knee amputation on 11/2/20 with Dr. Durbin, followed by completion of left AKA on 11/12.  Patient left [facility] AMA on 11/17  Presented to [facility] on 11/20 with purulent drainage from wound vac insertion site, with wound vac removal at outside hospital.   - Infectious Disease and orthopedic Sx on board  - on cefazolin 2g IV Q8H based on prior sensitivities  -I&D by orthopedic Sx on 11/26,  wound VAC placed  -Pain control    ID on board, continue Ancef x 6 weeks. But if the patient refuses, recommend p.o. Augmentin 875 mg twice daily for 6 weeks, along with outpatient wound care  D added fluconazole on 11/29 for  OR cultures growing yeast.   PICC ordered on 11/30.   Needs placement for long term IV Abx      Paroxysmal atrial fibrillation (HCC)- (present on admission)  Assessment & Plan   Continue Amiodarone 200mg QAM & metoprolol   [id number] =3    INR 2.01 on 11/24  Restarted heparin drip after the orthopedic procedure & now back on Warfarin     COPD (chronic obstructive pulmonary disease) ([facility])- (present on admission)  Assessment & Plan  - COPD exacerbation on admission  - PFTS from 2018 revealed FVC 37% predicted.  FEV1 35% predicted.  No   significant response to bronchodilators.  FEV1/FVC is 73. Total lung capacity 5.29,     Diffusion DLCO 41% predicted indicating mixed obstructive and restrictive disease  - wheezing on exam and c/o shortness of breath  -Chest x-ray showed pulmonary edema  -Oxygen per protocol  -RT protocol with inhalers  - Acapella treatment  - will avoid systemic steroids at this time due to active soft tissue infection      Pulmonary HTN ([facility])  Assessment & Plan  Estimated right ventricular systolic pressure  is 40 mmHg on echo on 11/2  - likely secondary to CHF, COPD and SHASHANK  - continue to manage COPD and CHF exacerbation      Acute exacerbation of CHF (congestive heart failure) ([facility])- (present on admission)  Assessment & Plan  PMH of CAD and NSTEMI  Patient recently admitted to [facility] found to have CHF exacerbation  - Last echo on 11/2  notable for LVEF 50%, Mild aortic stenosis,   Toprol 25mg QAM  Rosuvastatin 40mg daily  Lasix 40mg daily  Aldactone 25mg daily  Daily weights and I/Os    Type 2 diabetes mellitus with complication, without long-term current use of insulin ([facility])- (present on admission)  Assessment & Plan  - A1c 7.8 on 10/30/2020  - ISS with  hypoglycemia protocol  - DM diet  -Titrating insulin for better glycemic control    Hematoma of arm, right, initial encounter  Assessment & Plan  US RUE 11/27: a resolving hematoma in the right forearm  Wound care consulted.     SHASHANK (obstructive sleep apnea)- (present on admission)  Assessment & Plan  CPAP per RT    CKD (chronic kidney disease), stage III- (present on admission)  Assessment & Plan  - continue to monitor  - Creatinine at baseline at 0.69    Essential hypertension- (present on admission)  Assessment & Plan  Continue home medications with holding parameters       VTE prophylaxis: SCDs & Warfarin

## 2020-11-30 NOTE — PROGRESS NOTES
"   Orthopaedic PA Progress Note    Interval changes:Did well overnight . Gram stain - no orgs. CX + yeast  Patient requests motorized WC - deferred to attending.    ROS - Patient denies any new issues. No chest pain, dyspnea, or fever.  Pain well controlled.    /68   Pulse 70   Temp 36.7 °C (98 °F) (Temporal)   Resp 18   Ht 1.956 m (6' 5\")   Wt (!) 160.5 kg (353 lb 13.4 oz)   SpO2 91%     Patient seen and examined  No acute distress  Breathing non labored  RRR  LLE Vac intact, dressing intact,prox compartments soft    Recent Labs     11/28/20  0101   HEMOGLOBIN 9.4*   HEMATOCRIT 32.3*       Active Hospital Problems    Diagnosis   • Soft tissue infection [L08.9]     Priority: High   • Paroxysmal atrial fibrillation (HCC) [I48.0]     Priority: High   • COPD (chronic obstructive pulmonary disease) ([facility]) [J44.9]     Priority: High   • Pulmonary HTN ([facility]) [I27.20]     Priority: Medium   • Acute exacerbation of CHF (congestive heart failure) ([facility]) [I50.9]     Priority: Medium   • Type 2 diabetes mellitus with complication, without long-term current use of insulin ([facility]) [E11.8]     Priority: Medium   • CKD (chronic kidney disease), stage III [N18.30]     Priority: Low   • SHASHANK (obstructive sleep apnea) [G47.33]     Priority: Low   • Essential hypertension [I10]     Priority: Low   • Hematoma of arm, right, initial encounter [S40.021A]       Assessment/Plan:  POD#4 S/P I+D LLE,  L AKA hx, application wound vac  Wt bearing status - NWB LLE  PT/OT-initiated  Wound care:wound care to follow, change vacs 3xWkly  to secondary closure  Drains - no  Ceballos-no  Sutures/Staples out- 10-14 days post operatively  Antibiotics: Ancef now, add AF per ID (Diflucan PO). Consider IV single dose as well  DVT Prophylaxis- TEDS/SCDs/Foot pumps.   Heparin gtt per Med  Future Procedures - not anticipated  Case Coordination for Discharge Planning - Disposition pending Abx plan, wound care plan, likely Rehab vs. SNF vs LTAC. [place] " Resident. Hx AMA departure and mult. Complaints re. Prior nursing care.

## 2020-11-30 NOTE — DISCHARGE PLANNING
Anticipated Discharge Disposition: SNF v LTAC, possible need for IV antibiotics    Action: RN CM to patient's bedside to obtain SNF and LTAC choice.     Patient states his family is in the process of moving to [place], [place] and he will only go to a facility if it is near them. Otherwise, he states he wants to go home.    RN CM found that the closest SNF is [facility] and the closest LTAC is [facility] in [place]. RN CM to bedside to offer these options to patient. RN CM received consent to send referrals. Faxed to [facility]    Barriers to Discharge: SNF/LTAC acceptance    Plan: Follow and assist

## 2020-11-30 NOTE — THERAPY
Physical Therapy   Initial Evaluation     Patient Name: Joshua Medrano  Age:  54 y.o., Sex:  male  Medical Record #: [id number]  Today's Date: 11/30/2020     Precautions: Fall Risk, Non Weight Bearing Left Lower Extremity  Comments: L AKA    Assessment  Patient is 54 y.o. male admitted with AKA wound infection. Pt recently lef [facility] after initial AKA surgery. Pt has been relying on his wife, dtr, and son in law for assist with transfers. Difficult to gather information from patient as he was acting verbose throughout. Pt continuously talking about power WC he was given but was left at hospital he was transferred from. Pt currently limited by weakness, balance deficits, decreased activity tolerance, and cognition/new learning. PT will cont while in acute care setting.     Plan    Recommend Physical Therapy 3 times per week until therapy goals are met for the following treatments:  Community Re-integration, Neuro Re-Education / Balance, Self Care/Home Evaluation, Therapeutic Activities and Therapeutic Exercises    DC Equipment Recommendations: Unable to determine at this time  Discharge Recommendations: Recommend post-acute placement for additional physical therapy services prior to discharge home          11/30/20 0951   Vitals   O2 (LPM) 3   O2 Delivery Device Silicone Nasal Cannula   Prior Living Situation   Prior Services Continuous (24 Hour) Care Giving Family   Housing / Facility 1 Story House   Steps Into Home 0   Steps In Home 0   Bathroom Set up Bathtub / Shower Combination   Equipment Owned Wheelchair;Power Wheel Chair   Lives with - Patient's Self Care Capacity Spouse;Adult Children   Comments family has been assisting but unclear how much with transfers   Prior Level of Functional Mobility   Bed Mobility Independent   Transfer Status Required Assist   Ambulation Other (Comments)  (non ambulatory)   Distance Ambulation (Feet)   (WC)   Assistive Devices Used Wheelchair   Wheelchair Independent    Comments pt reported his son has been assisting with transfers   Cognition    Cognition / Consciousness X   Speech/ Communication Dysarthric   Level of Consciousness Alert   New Learning Impaired   Attention Impaired   Comments resting face with tongue out   Passive ROM Lower Body   Passive ROM Lower Body WDL   Comments unable to assess L hip extension   Strength Lower Body   Lower Body Strength  X   Comments R LE 4/5, L LE not assessed   Sensation Lower Body   Lower Extremity Sensation   X   Comments neuropathy   Balance Assessment   Sitting Balance (Static) Fair   Sitting Balance (Dynamic) Fair -   Weight Shift Sitting Fair   Comments EOB only   Gait Analysis   Gait Level Of Assist Unable to Participate   Bed Mobility    Supine to Sit Supervised   Sit to Supine Supervised   Scooting Supervised   Comments use of bed controls   Functional Mobility   Sit to Stand Unable to Participate   Bed, Chair, Wheelchair Transfer Unable to Participate   Mobility limtied to bed mobility as bariatric equipment needed for tx sessions   Short Term Goals    Short Term Goal # 1 Pt will be able to complete bed<>chair transfers with SPV in 6tx in order to return home   Short Term Goal # 2 Pt will be able to demonstrate 500ft of WC mobility independently with SPV in 6tx in order to return home   Anticipated Discharge Equipment and Recommendations   DC Equipment Recommendations Unable to determine at this time   Discharge Recommendations Recommend post-acute placement for additional physical therapy services prior to discharge home

## 2020-11-30 NOTE — DISCHARGE PLANNING
Received Choice form at 1215  Agency/Facility Name: [facility]  Referral sent per Choice form at 1225    1351  Received Choice form at 1351  Agency/Facility Name: [facility]   Referral sent per Choice form @ 1351

## 2020-12-01 ENCOUNTER — APPOINTMENT (OUTPATIENT)
Dept: RADIOLOGY | Facility: MEDICAL CENTER | Age: 55
DRG: 565 | End: 2020-12-01
Attending: STUDENT IN AN ORGANIZED HEALTH CARE EDUCATION/TRAINING PROGRAM
Payer: MEDICARE

## 2020-12-01 LAB
ALBUMIN SERPL BCP-MCNC: 2.8 G/DL (ref 3.2–4.9)
ALBUMIN/GLOB SERPL: 0.6 G/DL
ALP SERPL-CCNC: 92 U/L (ref 30–99)
ALT SERPL-CCNC: <5 U/L (ref 2–50)
ANION GAP SERPL CALC-SCNC: 8 MMOL/L (ref 7–16)
AST SERPL-CCNC: 13 U/L (ref 12–45)
BACTERIA SPEC ANAEROBE CULT: NORMAL
BACTERIA SPEC ANAEROBE CULT: NORMAL
BACTERIA TISS AEROBE CULT: ABNORMAL
BACTERIA TISS AEROBE CULT: ABNORMAL
BASOPHILS # BLD AUTO: 0.5 % (ref 0–1.8)
BASOPHILS # BLD: 0.04 K/UL (ref 0–0.12)
BILIRUB SERPL-MCNC: 0.4 MG/DL (ref 0.1–1.5)
BUN SERPL-MCNC: 14 MG/DL (ref 8–22)
CALCIUM SERPL-MCNC: 9.1 MG/DL (ref 8.5–10.5)
CHLORIDE SERPL-SCNC: 98 MMOL/L (ref 96–112)
CO2 SERPL-SCNC: 29 MMOL/L (ref 20–33)
CREAT SERPL-MCNC: 0.79 MG/DL (ref 0.5–1.4)
EOSINOPHIL # BLD AUTO: 0.27 K/UL (ref 0–0.51)
EOSINOPHIL NFR BLD: 3.3 % (ref 0–6.9)
ERYTHROCYTE [DISTWIDTH] IN BLOOD BY AUTOMATED COUNT: 70.8 FL (ref 35.9–50)
GLOBULIN SER CALC-MCNC: 4.9 G/DL (ref 1.9–3.5)
GLUCOSE BLD-MCNC: 133 MG/DL (ref 65–99)
GLUCOSE BLD-MCNC: 134 MG/DL (ref 65–99)
GLUCOSE BLD-MCNC: 197 MG/DL (ref 65–99)
GLUCOSE SERPL-MCNC: 114 MG/DL (ref 65–99)
GRAM STN SPEC: ABNORMAL
HCT VFR BLD AUTO: 32.4 % (ref 42–52)
HGB BLD-MCNC: 9.6 G/DL (ref 14–18)
IMM GRANULOCYTES # BLD AUTO: 0.07 K/UL (ref 0–0.11)
IMM GRANULOCYTES NFR BLD AUTO: 0.9 % (ref 0–0.9)
INR PPP: 2.57 (ref 0.87–1.13)
LYMPHOCYTES # BLD AUTO: 0.68 K/UL (ref 1–4.8)
LYMPHOCYTES NFR BLD: 8.4 % (ref 22–41)
MCH RBC QN AUTO: 29.3 PG (ref 27–33)
MCHC RBC AUTO-ENTMCNC: 29.6 G/DL (ref 33.7–35.3)
MCV RBC AUTO: 98.8 FL (ref 81.4–97.8)
MONOCYTES # BLD AUTO: 0.62 K/UL (ref 0–0.85)
MONOCYTES NFR BLD AUTO: 7.7 % (ref 0–13.4)
NEUTROPHILS # BLD AUTO: 6.38 K/UL (ref 1.82–7.42)
NEUTROPHILS NFR BLD: 79.2 % (ref 44–72)
NRBC # BLD AUTO: 0 K/UL
NRBC BLD-RTO: 0 /100 WBC
PLATELET # BLD AUTO: 195 K/UL (ref 164–446)
PMV BLD AUTO: 10.3 FL (ref 9–12.9)
POTASSIUM SERPL-SCNC: 4.1 MMOL/L (ref 3.6–5.5)
PROT SERPL-MCNC: 7.7 G/DL (ref 6–8.2)
PROTHROMBIN TIME: 28.4 SEC (ref 12–14.6)
RBC # BLD AUTO: 3.28 M/UL (ref 4.7–6.1)
SIGNIFICANT IND 70042: ABNORMAL
SIGNIFICANT IND 70042: NORMAL
SIGNIFICANT IND 70042: NORMAL
SITE SITE: ABNORMAL
SITE SITE: NORMAL
SITE SITE: NORMAL
SODIUM SERPL-SCNC: 135 MMOL/L (ref 135–145)
SOURCE SOURCE: ABNORMAL
SOURCE SOURCE: NORMAL
SOURCE SOURCE: NORMAL
WBC # BLD AUTO: 8.1 K/UL (ref 4.8–10.8)

## 2020-12-01 PROCEDURE — 700102 HCHG RX REV CODE 250 W/ 637 OVERRIDE(OP): Performed by: STUDENT IN AN ORGANIZED HEALTH CARE EDUCATION/TRAINING PROGRAM

## 2020-12-01 PROCEDURE — 700111 HCHG RX REV CODE 636 W/ 250 OVERRIDE (IP): Performed by: STUDENT IN AN ORGANIZED HEALTH CARE EDUCATION/TRAINING PROGRAM

## 2020-12-01 PROCEDURE — 82962 GLUCOSE BLOOD TEST: CPT | Mod: 91

## 2020-12-01 PROCEDURE — 770009 HCHG ROOM/CARE - ONCOLOGY SEMI PRI*

## 2020-12-01 PROCEDURE — A9270 NON-COVERED ITEM OR SERVICE: HCPCS | Performed by: STUDENT IN AN ORGANIZED HEALTH CARE EDUCATION/TRAINING PROGRAM

## 2020-12-01 PROCEDURE — 85610 PROTHROMBIN TIME: CPT

## 2020-12-01 PROCEDURE — 99232 SBSQ HOSP IP/OBS MODERATE 35: CPT | Performed by: STUDENT IN AN ORGANIZED HEALTH CARE EDUCATION/TRAINING PROGRAM

## 2020-12-01 PROCEDURE — 700111 HCHG RX REV CODE 636 W/ 250 OVERRIDE (IP): Performed by: INTERNAL MEDICINE

## 2020-12-01 PROCEDURE — 99233 SBSQ HOSP IP/OBS HIGH 50: CPT | Performed by: INTERNAL MEDICINE

## 2020-12-01 PROCEDURE — 80053 COMPREHEN METABOLIC PANEL: CPT

## 2020-12-01 PROCEDURE — 85025 COMPLETE CBC W/AUTO DIFF WBC: CPT

## 2020-12-01 PROCEDURE — B548ZZA ULTRASONOGRAPHY OF SUPERIOR VENA CAVA, GUIDANCE: ICD-10-PCS | Performed by: STUDENT IN AN ORGANIZED HEALTH CARE EDUCATION/TRAINING PROGRAM

## 2020-12-01 PROCEDURE — 700105 HCHG RX REV CODE 258: Performed by: INTERNAL MEDICINE

## 2020-12-01 PROCEDURE — 02HV33Z INSERTION OF INFUSION DEVICE INTO SUPERIOR VENA CAVA, PERCUTANEOUS APPROACH: ICD-10-PCS | Performed by: STUDENT IN AN ORGANIZED HEALTH CARE EDUCATION/TRAINING PROGRAM

## 2020-12-01 PROCEDURE — 36573 INSJ PICC RS&I 5 YR+: CPT

## 2020-12-01 PROCEDURE — 36415 COLL VENOUS BLD VENIPUNCTURE: CPT

## 2020-12-01 RX ORDER — WARFARIN SODIUM 5 MG/1
5 TABLET ORAL DAILY
Status: DISCONTINUED | OUTPATIENT
Start: 2020-12-01 | End: 2020-12-02

## 2020-12-01 RX ORDER — DEXTROSE MONOHYDRATE 25 G/50ML
50 INJECTION, SOLUTION INTRAVENOUS
Status: DISCONTINUED | OUTPATIENT
Start: 2020-12-01 | End: 2020-12-08

## 2020-12-01 RX ADMIN — QUETIAPINE FUMARATE 12.5 MG: 25 TABLET ORAL at 06:13

## 2020-12-01 RX ADMIN — OXYCODONE HYDROCHLORIDE 10 MG: 10 TABLET ORAL at 17:34

## 2020-12-01 RX ADMIN — ACETAMINOPHEN 500 MG: 500 TABLET ORAL at 20:32

## 2020-12-01 RX ADMIN — AMIODARONE HYDROCHLORIDE 200 MG: 200 TABLET ORAL at 06:12

## 2020-12-01 RX ADMIN — BUDESONIDE AND FORMOTEROL FUMARATE DIHYDRATE 2 PUFF: 80; 4.5 AEROSOL RESPIRATORY (INHALATION) at 06:10

## 2020-12-01 RX ADMIN — METOPROLOL SUCCINATE 25 MG: 25 TABLET, EXTENDED RELEASE ORAL at 06:12

## 2020-12-01 RX ADMIN — ROSUVASTATIN CALCIUM 40 MG: 20 TABLET, FILM COATED ORAL at 06:12

## 2020-12-01 RX ADMIN — FUROSEMIDE 40 MG: 40 TABLET ORAL at 06:13

## 2020-12-01 RX ADMIN — Medication: at 22:45

## 2020-12-01 RX ADMIN — MICAFUNGIN SODIUM 100 MG: 20 INJECTION, POWDER, LYOPHILIZED, FOR SOLUTION INTRAVENOUS at 15:38

## 2020-12-01 RX ADMIN — FERROUS SULFATE TAB 325 MG (65 MG ELEMENTAL FE) 325 MG: 325 (65 FE) TAB at 06:13

## 2020-12-01 RX ADMIN — SPIRONOLACTONE 25 MG: 25 TABLET ORAL at 06:13

## 2020-12-01 RX ADMIN — OXYCODONE HYDROCHLORIDE 10 MG: 10 TABLET ORAL at 12:48

## 2020-12-01 RX ADMIN — BUDESONIDE AND FORMOTEROL FUMARATE DIHYDRATE 2 PUFF: 80; 4.5 AEROSOL RESPIRATORY (INHALATION) at 17:36

## 2020-12-01 RX ADMIN — WARFARIN SODIUM 5 MG: 5 TABLET ORAL at 17:34

## 2020-12-01 RX ADMIN — ALPRAZOLAM 0.5 MG: 0.5 TABLET ORAL at 20:33

## 2020-12-01 RX ADMIN — CEFAZOLIN SODIUM 2 G: 2 INJECTION, SOLUTION INTRAVENOUS at 06:10

## 2020-12-01 RX ADMIN — DOCUSATE SODIUM 50 MG AND SENNOSIDES 8.6 MG 2 TABLET: 8.6; 5 TABLET, FILM COATED ORAL at 17:34

## 2020-12-01 RX ADMIN — CEFAZOLIN SODIUM 2 G: 2 INJECTION, SOLUTION INTRAVENOUS at 16:44

## 2020-12-01 RX ADMIN — Medication: at 10:45

## 2020-12-01 ASSESSMENT — PAIN DESCRIPTION - PAIN TYPE: TYPE: ACUTE PAIN

## 2020-12-01 ASSESSMENT — ENCOUNTER SYMPTOMS
CONSTIPATION: 0
SORE THROAT: 0
CHILLS: 0
NAUSEA: 0
WHEEZING: 0
COUGH: 0
MYALGIAS: 0
BLURRED VISION: 0
SHORTNESS OF BREATH: 0
VOMITING: 0
WEAKNESS: 1
DIARRHEA: 0
DOUBLE VISION: 0
FEVER: 0
FOCAL WEAKNESS: 0
DIZZINESS: 0
HEADACHES: 0
PALPITATIONS: 0
SINUS PAIN: 0
SPUTUM PRODUCTION: 0
NERVOUS/ANXIOUS: 0
ABDOMINAL PAIN: 0

## 2020-12-01 NOTE — DISCHARGE PLANNING
Anticipated Discharge Disposition: SNF v LTAC, possible need for IV antibiotics    Action: Per previous conversation, patient states his family is in the process of moving to [place], [place] and he will only go to a facility if it is near them. Otherwise, he states he wants to go home. Referrals were sent to [facility] and [facility]    RN CM received call from [facility] stating that they received the referral and are still reviewing.     Barriers to Discharge: SNF/LTAC acceptance    Plan: Follow and assist

## 2020-12-01 NOTE — PROGRESS NOTES
"   Orthopaedic PA Progress Note    Interval changes:Did well overnight . Gram stain - no orgs. CX + yeast  Patient requests motorized WC - deferred to attending. Wants to talk to Attending about bedsores.     ROS - Patient denies any new issues. No chest pain, dyspnea, or fever.  Pain well controlled.    /52   Pulse 70   Temp 36.8 °C (98.2 °F) (Temporal)   Resp 18   Ht 1.956 m (6' 5\")   Wt (!) 160.5 kg (353 lb 13.4 oz)   SpO2 93%     Patient seen and examined  No acute distress  Breathing non labored  RRR  LLE Vac intact, dressing intact,prox compartments soft    Recent Labs     12/01/20  0121   WBC 8.1   RBC 3.28*   HEMOGLOBIN 9.6*   HEMATOCRIT 32.4*   MCV 98.8*   MCH 29.3   MCHC 29.6*   RDW 70.8*   PLATELETCT 195   MPV 10.3       Active Hospital Problems    Diagnosis   • Soft tissue infection [L08.9]     Priority: High   • Paroxysmal atrial fibrillation (HCC) [I48.0]     Priority: High   • COPD (chronic obstructive pulmonary disease) ([facility]) [J44.9]     Priority: High   • Pulmonary HTN (HCC) [I27.20]     Priority: Medium   • Acute exacerbation of CHF (congestive heart failure) ([facility]) [I50.9]     Priority: Medium   • Type 2 diabetes mellitus with complication, without long-term current use of insulin ([facility]) [E11.8]     Priority: Medium   • CKD (chronic kidney disease), stage III [N18.30]     Priority: Low   • SHASHANK (obstructive sleep apnea) [G47.33]     Priority: Low   • Essential hypertension [I10]     Priority: Low   • Hematoma of arm, right, initial encounter [S40.021A]       Assessment/Plan:  POD#5 S/P I+D LLE,  L AKA hx, application wound vac  Wt bearing status - NWB LLE  PT/OT-initiated  Wound care:wound care to follow, change vacs 3xWkly  to secondary closure  Drains - no  Ceballos-no  Sutures/Staples out- 10-14 days post operatively  Antibiotics: Ancef now, add AF per ID (Diflucan PO). Consider IV single dose as well  DVT Prophylaxis- TEDS/SCDs/Foot pumps.   Heparin gtt per Med  Future Procedures - not " anticipated  Case Coordination for Discharge Planning - Disposition pending Abx plan, wound care plan, likely Rehab vs. SNF vs LTAC. [place] Resident. Hx AMA departure and mult. Complaints re. Prior nursing care.

## 2020-12-01 NOTE — PROCEDURES
Vascular Access Team     Date of Insertion: 12/1/20  Arm Circumference: 34  Internal length: 48  External Length: Hub  Vein Occupancy %: 30   Reason for PICC: Antibiotic therapy  Labs: WBC 9.1, , BUN 12, Cr 0.95, GFR >60, INR 2.17 on 11/30     Consents confirmed, vessel patency confirmed with ultrasound. Risks and benefits of procedure explained to patient and education regarding central line associated bloodstream infections provided. Questions answered.      PICC placed in LUE per licensed provider order with ultrasound guidance.  4 Fr, Single lumen PICC placed in Brachial vein after 1 attempt(s). 2 mL of 1% lidocaine injected intradermally, 21 gauge microintroducer needle and modified Seldinger technique used. 48 cm catheter inserted with good blood return. Secured at Hub. Each lumen flushed without resistance with 10 mL 0.9% normal saline. PICC line secured with Biopatch and Tegaderm.     PICC tip placement location is confirmed by nurse to be in the Superior Vena Cava (SVC) utilizing 3CG technology. PICC line is appropriate for use at this time. Patient tolerated procedure well, without complications.  Patient condition relayed to unit RN or ordering physician via this post procedure note in the EMR.      Ultrasound images uploaded to PACS and viewable in the EMR - yes  Ultrasound imaged printed and placed in paper chart - no     BARD Power PICC ref # [id number], Lot # [id number], Expiration Date 7/31/21

## 2020-12-01 NOTE — CARE PLAN
Problem: Communication  Goal: The ability to communicate needs accurately and effectively will improve  Outcome: PROGRESSING AS EXPECTED     Problem: Safety  Goal: Will remain free from injury  Outcome: PROGRESSING AS EXPECTED  Goal: Will remain free from falls  Outcome: PROGRESSING AS EXPECTED     Problem: Infection  Goal: Will remain free from infection  Outcome: PROGRESSING AS EXPECTED     Problem: Pain Management  Goal: Pain level will decrease to patient's comfort goal  Outcome: PROGRESSING AS EXPECTED     Problem: Psychosocial Needs:  Goal: Level of anxiety will decrease  Outcome: PROGRESSING AS EXPECTED

## 2020-12-01 NOTE — PROGRESS NOTES
Inpatient Anticoagulation Service Note    Date: 11/30/2020    Reason for Anticoagulation: Atrial Fibrillation   Target INR: 2.0 to 3.0    [id number] VASc Score: 6  HAS-BLED Score: 1   Hemoglobin Value: (!) 9.4  Hematocrit Value: (!) 32.3  Lab Platelet Value: 244    INR from last 7 days     Date/Time INR Value    11/30/20 0059  (!) 2.17    11/29/20 0234  (!) 1.95    11/28/20 0101  (!) 1.82    11/27/20 0417  (!) 1.69    11/26/20 1434  (!) 1.6    11/25/20 0558  (!) 1.84    11/25/20 0133  (!) 1.97    11/24/20 2331  (!) 2.01        Dose from last 7 days     Date/Time Dose (mg)    11/30/20 1800  5    11/29/20 1800  7.5    11/28/20 1800  5    11/27/20 1800  7.5    11/26/20 1800  10           HPI: 55 yo male admitted on 11/24 with left AKA wound infection s/p I&D with ortho on 11/26/20 cultures growing yeast. ID consulting, on Cefazolin and Micafungin. Patient is chronically anticoagulated with warfarin therapy for h/o paroxysmal Afib. Warfarin is managed outpatient by the [facility] and per records, patient is prescribed Warfarin 7.5 mg po every Sun, Tue, Thurs and Warfarin 5 mg po on all other days.      Assessment: INR therapeutic today.   · Potential drug-drug interactions identified with acute inpatient medications: Antibiotics - Cefazolin - not likely to have a profound effect on INR. Fluconazole initiated 11/29 but changed to Micafungin today by ID - also not likely to have an effect on INR.   · Potential drug-drug interactions identified with chronic home medications: Amiodarone and Crestor which should be established interactions with warfarin therapy at this point, although patient does have a documented history of non-compliance with medications.   · Inpatient Diet: Consistent CHO, Cardiac      Plan: Continue usual home dosing regimen as ordered. Continue daily INR monitoring for now, will reduce frequency of draws as patient demonstrates stabilization in range.      Education Material Provided?: No  (Chronic Warfarin Patient)     Pharmacist suggested discharge dosing: Warfarin 7.5 mg po every Sun, Tue, Thurs and Warfarin 5 mg po on all other days     Ilsa Arcos PharmD, BCPS

## 2020-12-01 NOTE — PROGRESS NOTES
No c/o  Left AKA stump soft  VAC in place  AVSS  WBC normal  Cx - candida    Plan:    ABX  VAC therapy for secondary healing  D/c planning

## 2020-12-01 NOTE — PROGRESS NOTES
[facility] Medicine Daily Progress Note    Date of Service  12/1/2020    Chief Complaint  54 y.o. male admitted 11/24/2020 with left AKA wound infection     Hospital Course  A 54 y.o. male with PMH of CAD, DM, COPD, CHF, and A-fib who had recent left AKA on 11/12 who left AMA from hospital, admitted on 11/24/20 as a transfer from [place], [place] for left AKA wound infection and shortness of breath secondary to CHF. ID & Ortho on board. On cefazolin 2g IV Q8H based on prior sensitivities. Ortho I & D left AKA wound on 11/26. ID added fluconazole on 11/29 for  OR cultures growing yeast    Interval Problem Update  No acute events overnight.  No acute complaints this morning.  Micafungin started yesterday.    Consultants/Specialty  ID   Infectious disease    Code Status  Full Code    Disposition  [facility]  Needs placement for long term IV Abx    Review of Systems  Review of Systems   Constitutional: Negative for chills, fever and malaise/fatigue.   HENT: Negative for congestion, ear discharge, ear pain, sinus pain and sore throat.    Eyes: Negative for blurred vision and double vision.   Respiratory: Negative for cough (resolved), sputum production, shortness of breath (resolved) and wheezing.    Cardiovascular: Positive for leg swelling. Negative for chest pain and palpitations.   Gastrointestinal: Negative for abdominal pain, constipation, diarrhea, nausea and vomiting.   Genitourinary: Negative for dysuria, frequency and urgency.   Musculoskeletal: Positive for joint pain. Negative for myalgias.   Neurological: Positive for weakness (diffuse). Negative for dizziness (resolved), focal weakness and headaches.   Psychiatric/Behavioral: The patient is not nervous/anxious.         Physical Exam  Temp:  [36.8 °C (98.2 °F)-37.1 °C (98.7 °F)] 36.8 °C (98.2 °F)  Pulse:  [65-79] 70  Resp:  [18-20] 18  BP: (105-123)/(52-80) 106/52  SpO2:  [93 %-98 %] 93 %    Physical Exam  Constitutional:       General: He is not in acute  distress.     Appearance: He is obese.   HENT:      Head: Normocephalic and atraumatic.      Nose: Nose normal.      Mouth/Throat:      Mouth: Mucous membranes are moist.      Pharynx: No posterior oropharyngeal erythema.   Eyes:      General: No scleral icterus.     Extraocular Movements: Extraocular movements intact.      Conjunctiva/sclera: Conjunctivae normal.      Pupils: Pupils are equal, round, and reactive to light.   Neck:      Musculoskeletal: Normal range of motion and neck supple. No neck rigidity.   Cardiovascular:      Rate and Rhythm: Normal rate. Rhythm irregular.      Pulses: Normal pulses.      Heart sounds: No murmur. No gallop.    Pulmonary:      Effort: Pulmonary effort is normal.      Breath sounds: Normal breath sounds. No stridor. No wheezing, rhonchi or rales.   Abdominal:      General: Bowel sounds are normal. There is distension.      Palpations: Abdomen is soft.      Tenderness: There is no abdominal tenderness. There is no guarding.   Musculoskeletal:         General: Swelling present. No tenderness.      Comments: Left AKA amputation  S/p I&D on left AKA wound, wound VAC in placed    RUE erythema & crusted areas   Skin:     General: Skin is warm.      Comments: Chronic venous stasis changes on her right lower extremity   Neurological:      General: No focal deficit present.      Mental Status: He is alert and oriented to person, place, and time.   Psychiatric:         Mood and Affect: Mood normal.         Behavior: Behavior normal.         Fluids    Intake/Output Summary (Last 24 hours) at 12/1/2020 0959  Last data filed at 12/1/2020 0400  Gross per 24 hour   Intake 250 ml   Output 1100 ml   Net -850 ml       Laboratory  Recent Labs     12/01/20  0121   WBC 8.1   RBC 3.28*   HEMOGLOBIN 9.6*   HEMATOCRIT 32.4*   MCV 98.8*   MCH 29.3   MCHC 29.6*   RDW 70.8*   PLATELETCT 195   MPV 10.3     Recent Labs     12/01/20  0121   SODIUM 135   POTASSIUM 4.1   CHLORIDE 98   CO2 29   GLUCOSE 114*    BUN 14   CREATININE 0.79   CALCIUM 9.1     Recent Labs     11/29/20  0234 11/30/20  0059 12/01/20  0121   INR 1.95* 2.17* 2.57*               Imaging  US-EXTREMITY VENOUS UPPER UNILAT LEFT   Final Result      CT-EXTREMITY, LOWER WITH LEFT   Final Result      Fluid collection along the distal posterior aspect of the above the knee amputation measuring 7.8 x 2.3 x 5.3 cm without significant peripheral enhancement to suggest abscess.      Extensive left thigh subcutaneous edema.      CT-ABDOMEN WITH & W/O   Final Result      Resolution of cystic pancreas lesion most consistent with pancreatic pseudocyst.      Cirrhotic appearing liver with splenomegaly.      Nonobstructive right renal stone.      Possible gallstones            US-EXTREMITY ARTERY LOWER UNILAT LEFT   Final Result      US-EXTREMITY VENOUS LOWER BILAT   Final Result      DX-CHEST-LIMITED (1 VIEW)   Final Result      Moderate bilateral interstitial opacities which likely represents pulmonary edema.      Stable cardiac silhouette enlargement.      IR-PICC LINE PLACEMENT W/ GUIDANCE > AGE 5    (Results Pending)        Assessment/Plan  * Soft tissue infection- (present on admission)  Assessment & Plan  s/p initial left intitial knee amputation on 11/2/20 with Dr. Durbin, followed by completion of left AKA on 11/12.  Patient left [facility] AMA on 11/17  Presented to [facility] on 11/20 with purulent drainage from wound vac insertion site, with wound vac removal at outside hospital.   - Infectious Disease and orthopedic Sx on board  - on cefazolin 2g IV Q8H based on prior sensitivities  -I&D by orthopedic Sx on 11/26, wound VAC placed  -Pain control  - ID on board, continue Ancef x 6 weeks. But if the patient refuses, recommend p.o. Augmentin 875 mg twice daily for 6 weeks, along with outpatient wound care  D added fluconazole on 11/29 for  OR cultures growing yeast.  On 11/30: ID stop fluconazole & started on Micafungin for concerning new rash.   PICC ordered on  11/30.   Needs placement for long term IV Abx      Paroxysmal atrial fibrillation (HCC)- (present on admission)  Assessment & Plan   Continue Amiodarone 200mg QAM & metoprolol   [id number]    INR 2.57 on 12/1    COPD (chronic obstructive pulmonary disease) ([facility])- (present on admission)  Assessment & Plan  - COPD exacerbation on admission  - PFTS from 2018 revealed FVC 37% predicted.  FEV1 35% predicted.  No   significant response to bronchodilators.  FEV1/FVC is 73. Total lung capacity 5.29,     Diffusion DLCO 41% predicted indicating mixed obstructive and restrictive disease  - wheezing on exam and c/o shortness of breath  -Chest x-ray showed pulmonary edema  -Oxygen per protocol  -RT protocol with inhalers  - Acapella treatment  - will avoid systemic steroids at this time due to active soft tissue infection      Pulmonary HTN ([facility])  Assessment & Plan  Estimated right ventricular systolic pressure  is 40 mmHg on echo on 11/2  - likely secondary to CHF, COPD and SHASHANK  - continue to manage COPD and CHF exacerbation      Acute exacerbation of CHF (congestive heart failure) ([facility])- (present on admission)  Assessment & Plan  PMH of CAD and NSTEMI  Patient recently admitted to [facility] found to have CHF exacerbation  - Last echo on 11/2  notable for LVEF 50%, Mild aortic stenosis,   Toprol 25mg QAM  Rosuvastatin 40mg daily  Lasix 40mg daily  Aldactone 25mg daily  Daily weights and I/Os    Type 2 diabetes mellitus with complication, without long-term current use of insulin ([facility])- (present on admission)  Assessment & Plan  - A1c 7.8 on 10/30/2020  - Correctional insulin with hypoglycemia protocol  - DM diet    Hematoma of arm, right, initial encounter  Assessment & Plan  US RUE 11/27: a resolving hematoma in the right forearm  Wound care consulted.     SHASHANK (obstructive sleep apnea)- (present on admission)  Assessment & Plan  CPAP per RT    CKD (chronic kidney disease), stage III- (present on admission)  Assessment &  Plan  - continue to monitor  - Creatinine at baseline at 0.69    Essential hypertension- (present on admission)  Assessment & Plan  Continue home medications with holding parameters       VTE prophylaxis: SCDs & warfarin

## 2020-12-01 NOTE — PROGRESS NOTES
Inpatient Anticoagulation Service Note    Date: 12/1/2020    Reason for Anticoagulation: Atrial Fibrillation   Target INR: 2.0 to 3.0    [id number] VASc Score: 6  HAS-BLED Score: 1     Hemoglobin Value: (!) 9.6  Hematocrit Value: (!) 32.4  Lab Platelet Value: 195    INR from last 7 days     Date/Time INR Value    12/01/20 0121  (!) 2.57    11/30/20 0059  (!) 2.17    11/29/20 0234  (!) 1.95    11/28/20 0101  (!) 1.82    11/27/20 0417  (!) 1.69    11/26/20 1434  (!) 1.6    11/25/20 0558  (!) 1.84    11/25/20 0133  (!) 1.97    11/24/20 2331  (!) 2.01        Dose from last 7 days     Date/Time Dose (mg)    12/01/20 1800  5    11/30/20 1800  5    11/29/20 1800  7.5    11/28/20 1800  5    11/27/20 1800  7.5    11/26/20 1800  10        HPI: 55 yo male admitted on 11/24 with left AKA wound infection s/p I&D with ortho on 11/26/20 cultures growing yeast. ID consulting, on Cefazolin and Micafungin. Patient is chronically anticoagulated with warfarin therapy for h/o paroxysmal Afib. Warfarin is managed outpatient by the [facility] and per records, patient is prescribed Warfarin 7.5 mg po every Sun, Tue, Thurs and Warfarin 5 mg po on all other days.      Assessment: INR remains within therapeutic range trending upward   · Potential drug-drug interactions identified with acute inpatient medications: Antibiotics - Cefazolin - not likely to have a profound effect on INR. Fluconazole initiated 11/29 but changed to Micafungin today by ID - also not likely to have an effect on INR.   · Potential drug-drug interactions identified with chronic home medications: Amiodarone and Crestor which should be established interactions with warfarin therapy at this point, although patient does have a documented history of non-compliance with medications.   · Inpatient Diet: Consistent CHO, Cardiac      Plan: Start warfarin 5 mg po daily due to trend upward. Continue daily INR monitoring.      Education Material Provided?: No (Chronic  Warfarin Patient)     Pharmacist suggested discharge dosing: Warfarin 7.5 mg po every Sun, Tue, Thurs and Warfarin 5 mg po on all other days     Zak MarcD, BCPS

## 2020-12-01 NOTE — PROGRESS NOTES
Infectious Disease Progress Note    Author: Carol Barber M.D. Date & Time of service: 2020  9:35 AM       Chief Complaint:  AKA stump fluid collection     Interval History:   afebrile, white count 9.1, taken to the OR yesterday and underwent I&D, gross appearance of liquefied hematoma was noted, cultures obtained.   afebrile, no CBC today.  Multiple OR cultures growing yeast thus far.    AF, O2 3 L NC, stable, pruritus and some generalized malaise.  He is continued on cefazolin 2 g every 8 hours and will stop fluconazole given new onset rash and as per notes he has tolerated cephalosporins in the past.  Will transition fluconazole to micafungin and monitor.     Review of Systems:  Review of Systems   Constitutional: Negative for chills and fever.   Respiratory: Negative for cough and shortness of breath.    Gastrointestinal: Negative for abdominal pain, diarrhea, nausea and vomiting.   Musculoskeletal: Negative for myalgias.   Skin: Positive for itching and rash.       Hemodynamics:  Temp (24hrs), Av.9 °C (98.4 °F), Min:36.8 °C (98.2 °F), Max:37.1 °C (98.7 °F)  Temperature: 36.8 °C (98.2 °F)  Pulse  Av.5  Min: 65  Max: 98   Blood Pressure: 106/52       Physical Exam:  Physical Exam  Constitutional:       Appearance: Normal appearance. He is obese.   Cardiovascular:      Rate and Rhythm: Normal rate and regular rhythm.      Heart sounds: Normal heart sounds.   Pulmonary:      Effort: Pulmonary effort is normal.      Comments: Decreased breath sounds on left lower   Abdominal:      General: Abdomen is flat. Bowel sounds are normal.      Palpations: Abdomen is soft.   Musculoskeletal:      Right lower leg: Edema present.      Comments: Left AKA wound VAC in place starting her with some mild erythema, no edema, no warmth or significant tenderness.  Right lower leg with edema, chronic skin changes   Skin:     Findings: Bruising and rash present.      Comments: Diffuse erythematous  micropapular rash, most prominent on back.  Right leg with chronic skin changes including thickened and scaling/peeling skin   Neurological:      General: No focal deficit present.      Mental Status: He is alert and oriented to person, place, and time.   Psychiatric:         Mood and Affect: Mood normal.         Behavior: Behavior normal.         Meds:    Current Facility-Administered Medications:   •  micafungin  •  ipratropium-albuterol  •  morphine injection  •  mineral oil-pet hydrophilic  •  budesonide-formoterol  •  warfarin  •  warfarin  •  ALPRAZolam  •  QUEtiapine  •  MD Alert...Warfarin per Pharmacy  •  insulin regular **AND** POC Blood Glucose **AND** NOTIFY MD and PharmD **AND** glucose **AND** dextrose 50%  •  acetaminophen  •  oxyCODONE immediate-release **OR** oxyCODONE immediate-release  •  pneumococcal 13-Paradise Conj Vacc  •  influenza vaccine quad  •  ceFAZolin  •  amiodarone  •  ferrous sulfate  •  furosemide  •  metoprolol SR  •  rosuvastatin  •  spironolactone  •  senna-docusate **AND** polyethylene glycol/lytes **AND** magnesium hydroxide **AND** bisacodyl  •  Respiratory Therapy Consult    Labs:  Recent Labs     12/01/20  0121   WBC 8.1   RBC 3.28*   HEMOGLOBIN 9.6*   HEMATOCRIT 32.4*   MCV 98.8*   MCH 29.3   RDW 70.8*   PLATELETCT 195   MPV 10.3   NEUTSPOLYS 79.20*   LYMPHOCYTES 8.40*   MONOCYTES 7.70   EOSINOPHILS 3.30   BASOPHILS 0.50     Recent Labs     12/01/20  0121   SODIUM 135   POTASSIUM 4.1   CHLORIDE 98   CO2 29   GLUCOSE 114*   BUN 14     Recent Labs     12/01/20  0121   ALBUMIN 2.8*   TBILIRUBIN 0.4   ALKPHOSPHAT 92   TOTPROTEIN 7.7   ALTSGPT <5   ASTSGOT 13   CREATININE 0.79       Imaging:  Ct-abdomen With & W/o    Result Date: 11/25/2020 11/25/2020 9:55 PM HISTORY/REASON FOR EXAM:  History of pancreas lesion. TECHNIQUE/EXAM DESCRIPTION:  CT pancreas and abdomen without and with contrast. Initial precontrast thick-section images were obtained through the pancreas.  Following this,  nonionic contrast was administered by IV, and thin-section helical scanning performed from the diaphragmatic domes through the iliac crests.  Pancreatic parenchymal phase and portal venous phase (dual-phase) images were obtained following contrast administration. 100 mL of Omnipaque 350 nonionic contrast was administered. Low dose optimization technique was utilized for this CT exam including automated exposure control and adjustment of the mA and/or kV according to patient size. COMPARISON: September 12, 2019 FINDINGS: There is bibasilar atelectasis with apparent trace right pleural fluid. There are old right rib fractures. There are coronary artery calcifications. The heart is enlarged. Initial noncontrast images reveal possible subtle gallstones dependently in the gallbladder. No pancreas calcifications identified. There is calcified plaque in the aorta and branch vessels. There is a nonobstructive right renal stone measuring 4 mm. With the administration of contrast the pancreas enhances homogeneously. The previously noted cystic structure has resolved most consistent with a pancreas pseudocyst. The pancreas is somewhat atrophic. The liver is heterogeneous with a nodular surface suggestive of cirrhosis. The spleen is enlarged measuring 19.6 cm anterior posteriorly. The main portal vein, splenic vein and superior mesenteric vein appear patent. There is a stable left adrenal gland nodule. The right adrenal gland is unremarkable. The kidneys enhance symmetrically. No hydronephrosis. No ascites or lymphadenopathy. The visualized bowel is nondilated. There is partial visualization of a ventral hernia containing fat.     Resolution of cystic pancreas lesion most consistent with pancreatic pseudocyst. Cirrhotic appearing liver with splenomegaly. Nonobstructive right renal stone. Possible gallstones     Ct-extremity, Lower With Left    Result Date: 11/25/2020 11/25/2020 9:54 PM HISTORY/REASON FOR EXAM:  Persistent  wound drainage; Persistent wound drainage from left AKA, evaluate for deep fluid collection. TECHNIQUE/EXAM DESCRIPTION AND NUMBER OF VIEWS:  CT scan of the LEFT lower extremity with contrast, with reconstructions. Thin helical 3 mm sections were obtained from the distal femur through the proximal tibia/fibula. Sagittal and coronal multiplanar reconstructions were generated from the axial images. A total of 100 mL of Omnipaque 350 nonionic contrast was administered  IV without complication. Up to date radiation dose reduction adjustments have been utilized to meet ALARA standards for radiation dose reduction. COMPARISON: None. FINDINGS: There is left thigh cutaneous edema. There is a fluid collection along the posterior aspect of the above the knee amputation. This measures approximately 7.8 x 2.3 x 5.3 cm. No obvious peripheral enhancement identified to suggest infection. No soft tissue gas identified. There are varicose veins identified.     Fluid collection along the distal posterior aspect of the above the knee amputation measuring 7.8 x 2.3 x 5.3 cm without significant peripheral enhancement to suggest abscess. Extensive left thigh subcutaneous edema.    Dx-chest-limited (1 View)    Result Date: 11/25/2020 11/25/2020 6:02 AM HISTORY/REASON FOR EXAM:  Shortness of Breath TECHNIQUE/EXAM DESCRIPTION AND NUMBER OF VIEWS: Single portable view of the chest. COMPARISON: 11/1/2020 FINDINGS: Cardiomediastinal silhouette is stable. Aortic calcified atherosclerotic plaque. Sternotomy wires. There are diffuse interstitial opacities probably representing pulmonary edema. Infection should be excluded clinically. No significant pleural effusion or pneumothorax. No acute osseous abnormality.     Moderate bilateral interstitial opacities which likely represents pulmonary edema. Stable cardiac silhouette enlargement.    Dx-chest-limited (1 View)    Result Date: 11/1/2020 11/1/2020 4:42 PM HISTORY/REASON FOR EXAM:  CVL  placement. Central line placement TECHNIQUE/EXAM DESCRIPTION AND NUMBER OF VIEWS: Single AP view of the chest. COMPARISON:  1 view chest 10/31/2020 FINDINGS: Right internal jugular catheter has been placed. The tip projects appropriately over the superior vena cava. LUNGS: There are pulmonary interstitial and alveolar densities that are consistent with edema. HEART and MEDIASTINUM: enlarged. There has been previous sternotomy. Pleura: There are no pleural effusion or pneumothoraces. Osseous structures: No significant bony abnormality.     1.  Right internal jugular catheter is been placed and appears appropriately located 2.  Moderate pulmonary edema 3.  Enlarged cardiac silhouette    Us-extremity Artery Lower Unilat Left    Result Date: 2020  Lower Extremity  Arterial Duplex Report  Vascular Laboratory  CONCLUSIONS  Prior study 10/30/20  Triphasic waveforms seen at the common femoral, profunda, and proximal-mid  femoral artery.  JARRETT WHITE  Exam Date:     2020 12:54  Room #:     Inpatient  Priority:     Routine  Ht (in):             Wt (lb):  Ordering Physician:        SYMONE NUÑEZ  Referring Physician:       SAVANNAH Rush  Sonographer:               Sonam Mccarty RVT, RDMS  Study Type:                Complete Unilateral  Technical Quality:         Poor  Age:    54    Gender:     M  MRN:    [id number]  :    1965      BSA:  Indications:     Atherosclerosis of peripheral arteries  CPT Codes:       25147  ICD Codes:       440.20  History:         Left above the knee amputation. PVD. Prior study 10/30/20  Limitations:     Technically difficult study due to patient body habitus.                RIGHT  Waveform        Peak Systolic Velocity (cm/s)                  Prox    Prox-Mid  Mid    Mid-Dist  Distal                                                             CFA                                                             PFA                                                              SFA                                                             POP                                                             AT                                                             PT                                                             [place]                LEFT  Waveform        Peak Systolic Velocity (cm/s)                  Prox    Prox-Mid  Mid    Mid-Dist  Distal  Triphasic                         58                       CFA  Triphasic       49                                         PFA  Triphasic       60                54                       SFA                                                             POP                                                             AT                                                             PT                                                             [place]  FINDINGS  Left.  Triphasic waveforms seen at the common femoral, profunda, and proximal-mid  femoral artery.  Distal femoral artery not visualized.  Xiang Mendieta MD  (Electronically Signed)  Final Date:      2020                   15:25    Us-extremity Venous Lower Bilat    Result Date: 2020   Vascular Laboratory  CONCLUSIONS  No prior study is available for comparison.  Limited study, no obvious DVT.  JARRETT WHITE  Exam Date:     2020 12:43  Room #:     Inpatient  Priority:     Routine  Ht (in):             Wt (lb):  Ordering Physician:        SYMONE NUÑEZ  Referring Physician:       [id number] Hyatt  Sonographer:               Sonam Mccarty RVT, RDMS  Study Type:                Limited Bilateral  Technical Quality:         Poor  Age:    54    Gender:     M  MRN:    [id number]  :    1965      BSA:  Indications:     Edema  CPT Codes:       31720  ICD Codes:       782.3  History:         Edema  Limitations:     Technically difficult study  due to patient body habitus.  PROCEDURES:  Bilateral lower extremity venous duplex imaging.  The following venous structures were evaluated: common femoral, profunda  femoral, prroximal greater saphenous, femoral, popliteal , peroneal and  posterior tibial veins.  *Left above the knee amputation.  FINDINGS:  Right lower extremity -  Patient refused compressions at the mid femoral, distal femoral, and  popliteal vein. Complete color filling visualized.  Calf veins not visualized.  All other veins of the right lower extremity demonstrate normal flow  dynamics with no evidence of deep vein thrombosis.  Left lower extremity -  Left above the knee amputation.  Patient refused compressions at the common femoral and distal femoral vein.  Complete color filling visualized.  All other veins of the left lower extremity demonstrate normal flow  dynamics with no evidence of deep vein thrombosis.  Xiang Mendieta MD  (Electronically Signed)  Final Date:      2020                   14:00    Us-extremity Venous Upper Unilat Left    Result Date: 2020   Upper Extremity  Venous Duplex Report  Vascular Laboratory  CONCLUSIONS  No prior exam is available for comparison.  LEFT ARM  Subacute, partially occlusive superficial thrombus in the median cubital  vein.  No evidence of DVT.  JARRETT WHITE  Exam Date:     2020 05:37  Room #:     Inpatient  Priority:     Routine  Ht (in):             Wt (lb):  Ordering Physician:        WENDY PEARL  Referring Physician:       [id number] Cheng  Sonographer:               Feli Nice                             FADY, [facility]  Study Type:                Complete Unilateral  Technical Quality:         Adequate  Age:    54    Gender:     M  MRN:    [id number]  :    1965      BSA:  Indications:     Swelling of Limb, Pain in Limb  CPT Codes:       22007  ICD Codes:       729.81  729.5  History:         Left distal bicep pain, swelling, and erythema x 1 day  Limitations:   PROCEDURES:  Left upper extremity venous duplex imaging.  The following venous structures were evaluated: internal jugular,  subclavian, axillary, brachial, cephalic and basilic veins.  Serial compression, augmentation maneuvers,  color and spectral Doppler  flow evaluations were performed.  FINDINGS:  Left upper extremity.  Subacute, partially occlusive superficial thrombus in the median cubital  vein.  All other veins demonstrate complete color filling and compressibility with  normal venous flow dynamics including spontaneous flow, response to  augmentation maneuvers, and respiratory phasicity.  No deep venous thrombosis.  Flow was evaluated in the contralateral subclavian vein and normal venous  flow dynamics including spontaneous flow, respiratory phasic variation and  augmentation were demonstrated.  Jerry Ellison MD  (Electronically Signed)  Final Date:      2020                   07:45    Ec-echocardiogram Ltd W/ Cont    Result Date: 11/3/2020  Transthoracic Echo Report Echocardiography Laboratory CONCLUSIONS Compared to the images of the prior study done on 10/31/2020, no significant changes are noted. Left ventricular ejection fraction is visually estimated to be 50%. Mild aortic stenosis. Estimated right ventricular systolic pressure  is 40 mmHg. JARRETT WHITE Exam Date:         2020                    09:41 Exam Location:     Inpatient Priority:          Routine Ordering Physician:        BHAVIN BUENO                             ([id number]) Referring Physician:       XIMENA De Jesus Sonographer:               Analia Swan [facility] [place] Age:    54     Gender:    M MRN:    [id number] :    1965 BSA:    2.84   Ht (in):    77     Wt (lb):    350 Exam Type:     Limited, Contrast Indications:     Congestive Heart Failure ICD Codes:       428 CPT Codes:       88906, , 95571, 81157 BP:   113    /   74     HR: Technical Quality:       Fair MEASUREMENTS  (Male / Female) Normal Values 2D  ECHO Estimated LV Ejection Fraction    50 %                  IVC Diameter                      2.2 cm                DOPPLER AV Peak Velocity                  2.7 m/s               AV Peak Gradient                  30.1 mmHg             AV Mean Gradient                  18.1 mmHg             LVOT Peak Velocity                1.3 m/s               MV Velocity Time Integral         35.8 cm               MV Pressure Half Time             80 ms                 MV Area PHT                       2.8 cm2               TR Peak Velocity                  299 cm/s              * Indicates values subject to auto-interpretation LV EF:  50    % FINDINGS Left Ventricle 3 mL of contrast was administered. Existing IV was used. Contrast was used to enhance visualization of the endocardial border. Left ventricular systolic function is low normal. Left ventricular ejection fraction is visually estimated to be 50%. Right Ventricle Right Atrium Left Atrium Mitral Valve Mild mitral stenosis. Mean transvalvular gradient is 3  mmHg at a heart rate of 78  BPM. Aortic Valve Mild aortic stenosis. Vmax is  2.6 m/s. Transvalvular gradients are - Peak: 29 mmHg, Mean: 17 mmHg. Tricuspid Valve Mild tricuspid regurgitation. Right atrial pressure is estimated to be 3 mmHg. Estimated right ventricular systolic pressure  is 40 mmHg. Pulmonic Valve Pericardium Normal pericardium without effusion. Aorta Jerry Reyes MD (Electronically Signed) Final Date:     03 November 2020                 12:41      Micro:  Results     Procedure Component Value Units Date/Time    Anaerobic Culture [[id number]] Collected: 11/26/20 1007    Order Status: Completed Specimen: Tissue Updated: 12/01/20 0903     Significant Indicator NEG     Source TISS     Site Left Stump wound#2     Culture Result No Anaerobes isolated.    Narrative:      Surgery Specimen    CULTURE TISSUE W/ GRM STAIN [[id number]]  (Abnormal) Collected: 11/26/20 1007    Order Status: Completed  "Specimen: Tissue Updated: 12/01/20 0903     Significant Indicator POS     Source TISS     Site Left Stump wound#2     Culture Result Growth noted after further incubation, see below for  organism identification.       Gram Stain Result Rare epithelial cells.  No organisms seen.       Culture Result Candida parapsilosis  Rare growth      Narrative:      Surgery Specimen    CULTURE WOUND W/ GRAM STAIN [[id number]]  (Abnormal) Collected: 11/26/20 1007    Order Status: Completed Specimen: Wound Updated: 12/01/20 0902     Significant Indicator POS     Source WND     Site Left Stump wound #1     Culture Result Growth noted after further incubation, see below for  organism identification.       Gram Stain Result Rare WBCs.  No organisms seen.       Culture Result Candida parapsilosis  Rare growth      Narrative:      Surgery - swabs received    Anaerobic Culture [[id number]] Collected: 11/26/20 1007    Order Status: Completed Specimen: Wound Updated: 12/01/20 0902     Significant Indicator NEG     Source WND     Site Left Stump wound #1     Culture Result No Anaerobes isolated.    Narrative:      Surgery - swabs received    BLOOD CULTURE [[id number]] Collected: 11/25/20 1431    Order Status: Completed Specimen: Blood from Peripheral Updated: 11/30/20 1700     Significant Indicator NEG     Source BLD     Site PERIPHERAL     Culture Result No growth after 5 days of incubation.    Narrative:      Per Hospital Policy: Only change Specimen Src: to \"Line\" if  specified by physician order.  Right Hand    BLOOD CULTURE [[id number]] Collected: 11/25/20 1013    Order Status: Completed Specimen: Blood from Peripheral Updated: 11/30/20 1300     Significant Indicator NEG     Source BLD     Site PERIPHERAL     Culture Result No growth after 5 days of incubation.    Narrative:      Per Hospital Policy: Only change Specimen Src: to \"Line\" if  specified by physician order.  Right Hand    GRAM STAIN [[id number]] Collected: 11/26/20 1007    Order " "Status: Completed Specimen: Tissue Updated: 11/26/20 1322     Significant Indicator .     Source TISS     Site Left Stump wound#2     Gram Stain Result Rare epithelial cells.  No organisms seen.      Narrative:      Surgery Specimen    GRAM STAIN [[id number]] Collected: 11/26/20 1007    Order Status: Completed Specimen: Wound Updated: 11/26/20 1321     Significant Indicator .     Source WND     Site Left Stump wound #1     Gram Stain Result Rare WBCs.  No organisms seen.      Narrative:      Surgery - swabs received    CoV-2, Flu A/B, And RSV by PCR [[id number]] Collected: 11/25/20 0140    Order Status: Completed Updated: 11/25/20 1238     Influenza virus A RNA Negative     Influenza virus B, PCR Negative     RSV, PCR Negative     SARS-CoV-2 by PCR NotDetected     Comment: PATIENTS: Important information regarding your results and instructions can  be found at [email or web address]   \"After your  Covid-19 Test\"  [facility] providers: PLEASE REFER TO DE-ESCALATION AND RETESTING PROTOCOL  on [email or web address]  **The [employer] GeneXpert Xpress SARS-CoV-2 Test has been made available for  use under the Emergency Use Authorization (EUA) only.          SARS-CoV-2 Source NP Swab    Narrative:      Collected By:[id number] JOCELYN PARTIDA  Is patient being admitted?->Yes  Does this patient meet criteria for Rush/Cepheid per [facility]  Inpatient Workflow? (See workflow link below)->No  Expected turn around time?->Routine (In-House PCR up to 24  hours)  Have you been in close contact with a person who is suspected  or known to be positive for COVID-19 within the last 30 days  (e.g. last seen that person < 30 days ago)->Unknown    Routine (COVID/SARS COV-2 In-House PCR up to 24 hours) [[id number]] Collected: 11/25/20 0140    Order Status: Completed Specimen: Respirate from Nasopharyngeal Updated: 11/25/20 0225     COVID Order Status Received     Comment: The order for SARS CoV-2 testing has been received by " the  Laboratory. This result is neither positive nor negative.  Final results of testing will report in 24-48 hours, separately.         Narrative:      Collected By:[id number] JOCELYN PARTIDA  Is patient being admitted?->Yes  Does this patient meet criteria for Rush/Cepheid per [facility]  Inpatient Workflow? (See workflow link below)->No  Expected turn around time?->Routine (In-House PCR up to 24  hours)  Have you been in close contact with a person who is suspected  or known to be positive for COVID-19 within the last 30 days  (e.g. last seen that person < 30 days ago)->Unknown    SARS-CoV-2, PCR (In-House) [[id number]] Collected: 11/25/20 0140    Order Status: Canceled           Assessment:  Active Hospital Problems    Diagnosis   • *Soft tissue infection [L08.9]   • Paroxysmal atrial fibrillation (HCC) [I48.0]   • COPD (chronic obstructive pulmonary disease) ([facility]) [J44.9]   • Pulmonary HTN ([facility]) [I27.20]   • Acute exacerbation of CHF (congestive heart failure) ([facility]) [I50.9]   • Type 2 diabetes mellitus with complication, without long-term current use of insulin ([facility]) [E11.8]   • CKD (chronic kidney disease), stage III [N18.30]   • SHASHANK (obstructive sleep apnea) [G47.33]   • Essential hypertension [I10]   • Hematoma of arm, right, initial encounter [S40.021A]     Interval 24 hours:      AF, O2 3 L NC  Labs reviewed  Micro reviewed    Patient with no specific complaints other than ongoing pruritus.  No significant pain at left AKA site.  Patient continued on cefazolin and micafungin.     Assessment/Hospital Course:  54-year-old male with known atrial fibrillation, methamphetamine abuse, CAD, PVD, transferred from a hospital in [place] on 11/24/2020 following an admission for CHF exacerbation.  Earlier this month, patient was admitted here with group A strep bacteremia and bilateral lower extremity necrotizing cellulitis with cultures growing group A strep, MSSA.  He underwent left-sided AKA on 11/12/2020 and then  left AGAINST MEDICAL ADVICE.  During his admission at the [facility], reportedly some purulent drainage was noted from his poorly functioning wound VAC, thus he was transferred here.  CT scan showed a fluid collection along the distal posterior aspect of the AKA measuring 7.8 x 2.3 x 5.3 cm, this was not peripherally enhancing.  Per verbal report, purulent drainage was also noted at the amputation site here, prior to I&D. Patient was taken to the OR on 11/26 for I&D, gross appearance of liquefied hematoma was noted.  Upper extremity ultrasound on 11/28 with subacute partially occlusive superficial thrombus in the median cubital vein.     Left AKA stump infection  Rash- new  Antibiotic allergies-penicillin allergy reported but per chart has tolerated cephalosporins     Plan:  -Reports of purulence at amputation site prior to I&D both here by my colleague as well as per reports at outside hospital acknowledged. Continue IV Ancef 2g q8h to cover previously grown organisms given likely infected hematoma  -Given proximity to residual femur, will likely treat with an extended course of antibiotics for 6 weeks (end 2/10/20) we will plan on treating with both cefazolin and micafungin.  Typically may extend oral antibiotics for ongoing infection with osteomyelitis patient is not tolerating fluconazole so will likely give 6-week course and then monitor.  -Multiple OR cultures growing yeast, identification awaited.    Due to new rash that appears to coincide with starting fluconazole will stop and transition to micafungin and monitor.    Rash ongoing but some improvement today suggesting the fluconazole was the cause.   -Given poor compliance and methamphetamine abuse, best course of action would be placement at a facility with continued wound care and IV antibiotics.   -Okay to place PICC line  -Will attempt to view wound during next VAC change     Discussed with internal medicine, Dr. Martino and wound care.  ID  will follow.  Please call with questions.

## 2020-12-01 NOTE — HEART FAILURE PROGRAM
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 He presented to the hospital in [place] on 11/20/20 with the wound vac still in place and purulent drainage and was subsequently transferred back to us.    He has a soft tissue infection which is clearly the principal problem for this admission.    Patient has a history of HF and it is mentioned in his notes but it is not specified whether or not it is currently decompensated.    I reached out to Dr. Martino to ask about this.    Thank you, Taylor Cardio RN Navigator [phone]    Treatment Team Sticky Notes  Comment  Pt has a PICC. Please do CHG. Thank you.    Per Dr. Martino's note on 12/2, this patient's HF is not acutely decompensated - only on home dose of PO furosemide, no IV diuretics have been given.    Thank you, Taylor Cardio RN Navigator [phone]   Last edited by Taylor Brar, RMAMADOU on 12/03/20 at 6443

## 2020-12-01 NOTE — WOUND TEAM
"[facility]  Inpatient Services  Wound and Skin Care Progress Note    Admission Date:  11/24/2020   HPI, PMH, SH: Reviewed  Unit where seen by Wound Team: [id number]    WOUND CONSULT RELATED TO:  Scheduled Negative Pressure Wound Therapy (NPWT) dressing change.      SUBJECTIVE: \"I was a tough joanne and this thing makes me cry.\"      Self Report / Pain Level:  10/10. Premedicated IV by RN, definitely needs PO as well. Ordered liquid lidocaine for next dressing change.     OBJECTIVE:  Previous dressing intact. Patient on pressure redistribution mattress with waffle overlay. Moves self.     WOUND TYPE, LOCATION, CHARACTERISTICS (Pressure ulcers: location, stage, POA or date identified)       Negative Pressure Wound Therapy 11/26/20 Surgical Thigh (Active)   NPWT Pump Mode / Pressure Setting Ulta;Continuous;125 mmHg 11/30/20 1400   Dressing Type Small;Black Foam (Regular) 11/30/20 1400   Number of Foam Pieces Used 2 11/30/20 1400   Canister Changed No 11/30/20 1400   NEXT Dressing Change/Treatment Date 12/02/20 11/30/20 1400           Wound 11/26/20 Full Thickness Wound Thigh Lateral Left open surgical site (Active)   Wound Image   11/28/20 1100   Site Assessment Red;Painful 11/30/20 1400   Periwound Assessment Intact 11/30/20 1400   Margins Defined edges;Unattached edges 11/30/20 1400   Closure Secondary intention 11/30/20 1400   Drainage Amount Moderate 11/30/20 1400   Drainage Description Serosanguineous 11/30/20 1400   Treatments Cleansed;Site care 11/30/20 1400   Wound Cleansing Normal Saline Irrigation 11/30/20 1400   Periwound Protectant Skin Protectant Wipes to Periwound;Drape 11/30/20 1400   Dressing Cleansing/Solutions Not Applicable 11/30/20 1400   Dressing Options Wound Vac 11/30/20 1400   Dressing Changed Changed 11/30/20 1400   Dressing Status Clean;Dry;Intact 11/30/20 1400   Dressing Change/Treatment Frequency Monday, Wednesday, Friday, and As Needed 11/30/20 1400   NEXT Dressing " Change/Treatment Date 12/02/20 11/30/20 1400   NEXT Weekly Photo (Inpatient Only) 12/04/20 11/30/20 1400   Non-staged Wound Description Full thickness 11/30/20 1400   Wound Length (cm) 6.5 cm 11/28/20 1100   Wound Width (cm) 2 cm 11/28/20 1100   Wound Depth (cm) 2 cm 11/28/20 1100   Wound Surface Area (cm^2) 13 cm^2 11/28/20 1100   Wound Volume (cm^3) 26 cm^3 11/28/20 1100   Tunneling (cm) 5.5 cm 11/28/20 1100   Tunneling Clock Position of Wound 6 11/28/20 1100   Shape irregular 11/30/20 1400   Wound Odor None 11/30/20 1400   Pulses N/A 11/30/20 1400   Exposed Structures None 11/30/20 1400               Vascular: N/A    Lab Values     Results for JARRETT WHITE (MRN [id number]) as of 11/28/2020 11:30   Ref. Range 11/27/2020 04:17 11/28/2020 01:01   WBC Latest Ref Range: 4.8 - 10.8 K/uL 9.1    RBC Latest Ref Range: 4.70 - 6.10 M/uL 3.15 (L)    Hemoglobin Latest Ref Range: 14.0 - 18.0 g/dL 9.2 (L) 9.4 (L)   Hematocrit Latest Ref Range: 42.0 - 52.0 % 31.7 (L) 32.3 (L)     AIC:      Lab Results   Component Value Date/Time    HBA1C 7.8 (H) 10/30/2020 06:53 AM         Culture:   Completed 11/26/20    INTERVENTIONS BY WOUND TEAM: . Patient anxious about dressing change and asked nurse apprentice to hold his hand. She kept patient engaged in conversation to help distract from the pain. Soaked foam with NS prior to removal. Used adhesive remover to remove drape from skin, then removed foam which was very painful for patient.  Cleaned with NS. Applied No Sting and and drape to periwound skin. One strip of foam into wound bed and tract, the one piece for TRAC pad. Sealed with drape. Resumed VAC at 125 mmHg continuous.    Interdisciplinary consultation:  RN, patient     EVALUATION:  Wound clean. NPWT encourages granulation tissue growth. Maintains optimal moisture needed for wound healing. There was a consult for a hematoma to right arm but there is no wound of any kind to right arm. ABE saw left arm on 11/28/2020, orders  written    Factors affecting wound healing:    Past Medical History:   Past Medical History was reviewed with patient.   has a past medical history of ASTHMA, Atrial fibrillation (HCC), CAD (coronary artery disease), Chronic obstructive pulmonary disease (HCC), Congestive heart failure (HCC), and Diabetes.     Past Surgical History: Past Surgical History was reviewed with patient.   has a past surgical history that includes other abdominal surgery; multiple coronary artery bypass endo vein harvest (12/22/2017); oscar (12/22/2017); thoracoscopy (Left, 1/25/2018); colonoscopy - endo (N/A, 7/25/2018); knee amputation below (Left, 11/4/2020); and knee amputation above (Left, 11/12/2020).     Goals:  Steady decrease in wound area and depth weekly     NURSING PLAN OF CARE ORDERS (X):    Dressing changes: See Dressing Care orders:   X  Skin care: See Skin Care orders:   Rectal tube care: See Rectal Tube Care orders:   Other orders:    WOUND TEAM PLAN OF CARE (X):   NPWT change 3 x week:    X    Dressing changes by wound team:       Follow up as needed:       Other (explain):    Anticipated discharge plans (X):  SNF:           Home Care:    X       Outpatient Wound Center:            Self Care:            Other:

## 2020-12-02 LAB
ANION GAP SERPL CALC-SCNC: 8 MMOL/L (ref 7–16)
ANISOCYTOSIS BLD QL SMEAR: ABNORMAL
BASOPHILS # BLD AUTO: 0.5 % (ref 0–1.8)
BASOPHILS # BLD: 0.04 K/UL (ref 0–0.12)
BUN SERPL-MCNC: 16 MG/DL (ref 8–22)
CALCIUM SERPL-MCNC: 9.3 MG/DL (ref 8.5–10.5)
CHLORIDE SERPL-SCNC: 95 MMOL/L (ref 96–112)
CO2 SERPL-SCNC: 32 MMOL/L (ref 20–33)
COMMENT 1642: NORMAL
CREAT SERPL-MCNC: 0.85 MG/DL (ref 0.5–1.4)
EOSINOPHIL # BLD AUTO: 0.29 K/UL (ref 0–0.51)
EOSINOPHIL NFR BLD: 3.6 % (ref 0–6.9)
ERYTHROCYTE [DISTWIDTH] IN BLOOD BY AUTOMATED COUNT: 71 FL (ref 35.9–50)
GLUCOSE BLD-MCNC: 118 MG/DL (ref 65–99)
GLUCOSE BLD-MCNC: 134 MG/DL (ref 65–99)
GLUCOSE BLD-MCNC: 157 MG/DL (ref 65–99)
GLUCOSE BLD-MCNC: 169 MG/DL (ref 65–99)
GLUCOSE SERPL-MCNC: 110 MG/DL (ref 65–99)
HCT VFR BLD AUTO: 32.6 % (ref 42–52)
HGB BLD-MCNC: 9.6 G/DL (ref 14–18)
IMM GRANULOCYTES # BLD AUTO: 0.07 K/UL (ref 0–0.11)
IMM GRANULOCYTES NFR BLD AUTO: 0.9 % (ref 0–0.9)
INR PPP: 3.05 (ref 0.87–1.13)
LYMPHOCYTES # BLD AUTO: 0.81 K/UL (ref 1–4.8)
LYMPHOCYTES NFR BLD: 10 % (ref 22–41)
MACROCYTES BLD QL SMEAR: ABNORMAL
MCH RBC QN AUTO: 29.3 PG (ref 27–33)
MCHC RBC AUTO-ENTMCNC: 29.4 G/DL (ref 33.7–35.3)
MCV RBC AUTO: 99.4 FL (ref 81.4–97.8)
MONOCYTES # BLD AUTO: 0.76 K/UL (ref 0–0.85)
MONOCYTES NFR BLD AUTO: 9.4 % (ref 0–13.4)
MORPHOLOGY BLD-IMP: NORMAL
NEUTROPHILS # BLD AUTO: 6.09 K/UL (ref 1.82–7.42)
NEUTROPHILS NFR BLD: 75.6 % (ref 44–72)
NRBC # BLD AUTO: 0 K/UL
NRBC BLD-RTO: 0 /100 WBC
PLATELET # BLD AUTO: 200 K/UL (ref 164–446)
PLATELET BLD QL SMEAR: NORMAL
PMV BLD AUTO: 10.9 FL (ref 9–12.9)
POLYCHROMASIA BLD QL SMEAR: NORMAL
POTASSIUM SERPL-SCNC: 4.3 MMOL/L (ref 3.6–5.5)
PROTHROMBIN TIME: 32.5 SEC (ref 12–14.6)
RBC # BLD AUTO: 3.28 M/UL (ref 4.7–6.1)
RBC BLD AUTO: PRESENT
SODIUM SERPL-SCNC: 135 MMOL/L (ref 135–145)
WBC # BLD AUTO: 8.1 K/UL (ref 4.8–10.8)

## 2020-12-02 PROCEDURE — 99232 SBSQ HOSP IP/OBS MODERATE 35: CPT | Performed by: STUDENT IN AN ORGANIZED HEALTH CARE EDUCATION/TRAINING PROGRAM

## 2020-12-02 PROCEDURE — 85025 COMPLETE CBC W/AUTO DIFF WBC: CPT

## 2020-12-02 PROCEDURE — 770009 HCHG ROOM/CARE - ONCOLOGY SEMI PRI*

## 2020-12-02 PROCEDURE — 94760 N-INVAS EAR/PLS OXIMETRY 1: CPT

## 2020-12-02 PROCEDURE — 82962 GLUCOSE BLOOD TEST: CPT

## 2020-12-02 PROCEDURE — 94640 AIRWAY INHALATION TREATMENT: CPT

## 2020-12-02 PROCEDURE — 80048 BASIC METABOLIC PNL TOTAL CA: CPT

## 2020-12-02 PROCEDURE — 700111 HCHG RX REV CODE 636 W/ 250 OVERRIDE (IP): Performed by: INTERNAL MEDICINE

## 2020-12-02 PROCEDURE — 85610 PROTHROMBIN TIME: CPT

## 2020-12-02 PROCEDURE — 700102 HCHG RX REV CODE 250 W/ 637 OVERRIDE(OP): Performed by: STUDENT IN AN ORGANIZED HEALTH CARE EDUCATION/TRAINING PROGRAM

## 2020-12-02 PROCEDURE — 700105 HCHG RX REV CODE 258: Performed by: INTERNAL MEDICINE

## 2020-12-02 PROCEDURE — 99233 SBSQ HOSP IP/OBS HIGH 50: CPT | Performed by: INTERNAL MEDICINE

## 2020-12-02 PROCEDURE — 700101 HCHG RX REV CODE 250: Performed by: STUDENT IN AN ORGANIZED HEALTH CARE EDUCATION/TRAINING PROGRAM

## 2020-12-02 PROCEDURE — 97605 NEG PRS WND THER DME<=50SQCM: CPT

## 2020-12-02 PROCEDURE — 700111 HCHG RX REV CODE 636 W/ 250 OVERRIDE (IP): Performed by: STUDENT IN AN ORGANIZED HEALTH CARE EDUCATION/TRAINING PROGRAM

## 2020-12-02 PROCEDURE — A9270 NON-COVERED ITEM OR SERVICE: HCPCS | Performed by: STUDENT IN AN ORGANIZED HEALTH CARE EDUCATION/TRAINING PROGRAM

## 2020-12-02 RX ORDER — WARFARIN SODIUM 5 MG/1
5 TABLET ORAL DAILY
Status: DISCONTINUED | OUTPATIENT
Start: 2020-12-03 | End: 2020-12-08 | Stop reason: HOSPADM

## 2020-12-02 RX ADMIN — OXYCODONE HYDROCHLORIDE 10 MG: 10 TABLET ORAL at 10:50

## 2020-12-02 RX ADMIN — OXYCODONE HYDROCHLORIDE 10 MG: 10 TABLET ORAL at 16:48

## 2020-12-02 RX ADMIN — CEFAZOLIN SODIUM 2 G: 2 INJECTION, SOLUTION INTRAVENOUS at 01:22

## 2020-12-02 RX ADMIN — DOCUSATE SODIUM 50 MG AND SENNOSIDES 8.6 MG 2 TABLET: 8.6; 5 TABLET, FILM COATED ORAL at 05:57

## 2020-12-02 RX ADMIN — MORPHINE SULFATE 1 MG: 4 INJECTION INTRAVENOUS at 09:48

## 2020-12-02 RX ADMIN — IPRATROPIUM BROMIDE AND ALBUTEROL SULFATE 3 ML: .5; 3 SOLUTION RESPIRATORY (INHALATION) at 16:14

## 2020-12-02 RX ADMIN — CEFAZOLIN SODIUM 2 G: 2 INJECTION, SOLUTION INTRAVENOUS at 16:47

## 2020-12-02 RX ADMIN — OXYCODONE HYDROCHLORIDE 10 MG: 10 TABLET ORAL at 05:59

## 2020-12-02 RX ADMIN — BUDESONIDE AND FORMOTEROL FUMARATE DIHYDRATE 2 PUFF: 80; 4.5 AEROSOL RESPIRATORY (INHALATION) at 16:48

## 2020-12-02 RX ADMIN — ROSUVASTATIN CALCIUM 40 MG: 20 TABLET, FILM COATED ORAL at 05:58

## 2020-12-02 RX ADMIN — BUDESONIDE AND FORMOTEROL FUMARATE DIHYDRATE 2 PUFF: 80; 4.5 AEROSOL RESPIRATORY (INHALATION) at 06:04

## 2020-12-02 RX ADMIN — CEFAZOLIN SODIUM 2 G: 2 INJECTION, SOLUTION INTRAVENOUS at 08:18

## 2020-12-02 RX ADMIN — MICAFUNGIN SODIUM 100 MG: 20 INJECTION, POWDER, LYOPHILIZED, FOR SOLUTION INTRAVENOUS at 13:24

## 2020-12-02 RX ADMIN — OXYCODONE HYDROCHLORIDE 10 MG: 10 TABLET ORAL at 01:21

## 2020-12-02 RX ADMIN — SPIRONOLACTONE 25 MG: 25 TABLET ORAL at 05:57

## 2020-12-02 RX ADMIN — QUETIAPINE FUMARATE 12.5 MG: 25 TABLET ORAL at 05:58

## 2020-12-02 RX ADMIN — FERROUS SULFATE TAB 325 MG (65 MG ELEMENTAL FE) 325 MG: 325 (65 FE) TAB at 05:58

## 2020-12-02 RX ADMIN — AMIODARONE HYDROCHLORIDE 200 MG: 200 TABLET ORAL at 05:58

## 2020-12-02 RX ADMIN — IPRATROPIUM BROMIDE AND ALBUTEROL SULFATE 3 ML: .5; 3 SOLUTION RESPIRATORY (INHALATION) at 02:36

## 2020-12-02 RX ADMIN — OXYCODONE HYDROCHLORIDE 10 MG: 10 TABLET ORAL at 21:27

## 2020-12-02 RX ADMIN — Medication: at 21:19

## 2020-12-02 RX ADMIN — Medication: at 10:51

## 2020-12-02 ASSESSMENT — ENCOUNTER SYMPTOMS
FOCAL WEAKNESS: 0
SPUTUM PRODUCTION: 0
MYALGIAS: 0
DIARRHEA: 0
CONSTIPATION: 0
CHILLS: 0
SHORTNESS OF BREATH: 0
VOMITING: 0
WHEEZING: 0
SINUS PAIN: 0
DIZZINESS: 0
SORE THROAT: 0
WEAKNESS: 1
NAUSEA: 0
DOUBLE VISION: 0
BLURRED VISION: 0
COUGH: 0
FEVER: 0
ABDOMINAL PAIN: 0
NERVOUS/ANXIOUS: 0
MYALGIAS: 1
HEADACHES: 0
COUGH: 1
PALPITATIONS: 0

## 2020-12-02 ASSESSMENT — PAIN DESCRIPTION - PAIN TYPE
TYPE: ACUTE PAIN

## 2020-12-02 NOTE — PROGRESS NOTES
Inpatient Anticoagulation Service Note    Date: 12/2/2020    Reason for Anticoagulation: Atrial Fibrillation   Target INR: 2.0 to 3.0    [id number] VASc Score: 6  HAS-BLED Score: 1     Hemoglobin Value: (!) 9.6  Hematocrit Value: (!) 32.6  Lab Platelet Value: 200    INR from last 7 days     Date/Time INR Value    12/02/20 0315  (!) 3.05    12/01/20 0121  (!) 2.57    11/30/20 0059  (!) 2.17    11/29/20 0234  (!) 1.95    11/28/20 0101  (!) 1.82    11/27/20 0417  (!) 1.69    11/26/20 1434  (!) 1.6        Dose from last 7 days     Date/Time Dose (mg)    12/02/20 1800  0    12/01/20 1800  5    11/30/20 1800  5    11/29/20 1800  7.5    11/28/20 1800  5    11/27/20 1800  7.5    11/26/20 1800  10        HPI: 53 yo male admitted on 11/24 with left AKA wound infection s/p I&D with ortho on 11/26/20 cultures growing Candida Parapsilosis. ID consulting, on Cefazolin and Micafungin (due to rash onset with fluconazole). Patient is chronically anticoagulated with warfarin therapy for h/o paroxysmal Afib. Warfarin is managed outpatient by the [facility] and per records, patient is prescribed Warfarin 7.5 mg po every Sun, Tue, Thurs and Warfarin 5 mg po on all other days.      Assessment: INR with continued trend upward into SUPRA therapeutic range despite dose reduction to warfarin 5 mg po daily yesterday. Warfarin 7.5 mg doses still demonstrating effect on INR.  Possibly due to DDIs + demonstrative of outpatient non compliance as receiving home med amiodarone consistently may also be contributing.   · Potential drug-drug interactions identified with acute inpatient medications: Antibiotics - Cefazolin - not likely to have a profound effect on INR. Fluconazole initiated 11/29 but changed to Micafungin today by ID - also not likely to have an effect on INR.   · Potential drug-drug interactions identified with chronic home medications: Amiodarone and Crestor which should be established interactions with warfarin therapy at  this point, although patient does have a documented history of non-compliance with medications.   · Inpatient Diet: Consistent CHO, Cardiac      Plan: Hold warfarin tonight then Re-Start warfarin 5 mg po daily pending INR trend tomorrow. Continue daily INR monitoring.      Education Material Provided?: No (Chronic Warfarin Patient)     Pharmacist suggested discharge dosing: Warfarin 5 mg po daily.      Ilsa Arcos, PharmD, BCPS

## 2020-12-02 NOTE — OP REPORT
DATE OF SERVICE:  11/12/2020    PREOPERATIVE DIAGNOSIS:  Left necrotizing lower extremity infection, status   post guillotine through-knee amputation.    POSTOPERATIVE DIAGNOSIS:  Left necrotizing lower extremity infection, status   post guillotine through-knee amputation.    PROCEDURE:  Re-amputation of left lower extremity to formal above-the-knee   amputation.    SURGEON:  Johnathan Durbin MD    ASSISTANT:  None.    ANESTHESIA TYPE:  General.    ANESTHESIOLOGIST:  Jun Pitts MD    SPECIMENS:  None.    ESTIMATED BLOOD LOSS:  100 mL.    COMPLICATIONS:  None.    OPERATIVE INDICATIONS:  The patient is a 54-year-old male who returns today   for planned return to the OR for conversion from guillotine through-knee   amputation to a formal above-the-knee amputation.  He underwent the guillotine   through-knee amputation for a necrotizing lower extremity infection without   source control.  His wound VAC has a fair amount of bloody output.  His INR   has improved.  His cellulitis is notably improved.  There is minimal if any   residual erythematous change of his left lower extremity.  Given these   findings, he is an appropriately indicated candidate for conversion of his   through-knee to an above-the-knee amputation.  Risks and benefits of the   procedure were discussed with the patient.  The patient indicates   understanding.  Informed consent was signed and documented.  Primary risk   discussed were blood loss, phantom limb sensation and pain, need for   additional surgery, and the medical risk of failure to clear the infection and   the medical risks of anesthesia.  Informed consent was signed and documented.    I met with him preoperatively and marked the operative extremity.    OPERATIVE COURSE:  He underwent general anesthesia and was positioned supine.    All bony prominences were well padded.  The residual left lower extremity was   prepped and draped in sterile orthopedic fashion with an iodine prep  given   his open wound and the surgical team scrubbed in.  A procedural pause was   conducted.  Following generalized agreement, we made formal fish-mouth   incisions revising his guillotine incision.  We dissected down to the   quadriceps tendon, which was incised with cautery.  Quadriceps musculature   flap was elevated proximally.  We dissected proximally along the bone and   skeletonized the anterior aspect of the proximal femur to the level of the   adductor hudson insertion distally.  We then made a transverse osteotomy with   the saw at a femoral level, preserving the adductor hudson tendon insertion   between 11 and 15 cm proximal to the knee joint itself.  When that was   complete, we completed the amputation with the amputation knife.  Hemostasis   was obtained.  The vessels were ligated.  A traction neurolysis was performed on both   divisions of the sciatic nerve rather.  When that was complete, hemostasis was   obtained with cautery.  We thoroughly irrigated with copious amounts of   normal saline.  Tourniquet was deflated.  Quadriceps myodesis was performed   with a #5 Ti-Cron through drill holes in the posterior femur.  When that was   complete, we closed the amputation over Hemovac drain with a few 0 Vicryl   suture, 2-0 Vicryl suture and 2-0 nylon suture.  Sterile dressings were   applied.  Patient was transferred to the recovery room in stable condition,   sustaining no complications.    POSTOPERATIVE PLAN:  1.  Nonweightbearing operative extremity.  2.  Antibiotics per medicine and ID.  3.  DVT prophylaxis with SCDs and Lovenox.  If hemoglobin stable, okay to   transition back to his long-term anticoagulation after 3-4 days.  4.  Remove drain when output less than 30 mL for 24 hours.  5.  Discharge planning.       ____________________________________     MD GAGE Carey / YOSVANY    DD:  12/02/2020 08:19:38  DT:  12/02/2020 10:09:04    D#:  [id number]  Job#:  [id number]

## 2020-12-02 NOTE — PROGRESS NOTES
Infectious Disease Progress Note    Author: Carol Barber M.D. Date & Time of service: 2020  7:47 AM       Chief Complaint:  AKA stump fluid collection     Interval History:   afebrile, white count 9.1, taken to the OR yesterday and underwent I&D, gross appearance of liquefied hematoma was noted, cultures obtained.   afebrile, no CBC today.  Multiple OR cultures growing yeast thus far.    AF, O2 3 L NC, stable, pruritus and some generalized malaise.  He is continued on cefazolin 2 g every 8 hours and will stop fluconazole given new onset rash and as per notes he has tolerated cephalosporins in the past.  Will transition fluconazole to micafungin and monitor.    AF, O2 3 L NC, no specific complaints other than ongoing pruritus.  No significant pain at left AKA site.  Patient continued on cefazolin and micafungin.     Review of Systems:  Review of Systems   Constitutional: Negative for chills and fever.   Respiratory: Positive for cough. Negative for shortness of breath.    Gastrointestinal: Negative for abdominal pain, constipation, diarrhea, nausea and vomiting.   Musculoskeletal: Positive for myalgias.       Hemodynamics:  Temp (24hrs), Av.7 °C (98 °F), Min:36.1 °C (97 °F), Max:37.1 °C (98.7 °F)  Temperature: 36.1 °C (97 °F)  Pulse  Av.2  Min: 62  Max: 98   Blood Pressure: (!) 96/48       Physical Exam:  Physical Exam  Constitutional:       Appearance: Normal appearance.   Cardiovascular:      Rate and Rhythm: Normal rate and regular rhythm.      Heart sounds: Normal heart sounds.   Pulmonary:      Effort: Pulmonary effort is normal.      Breath sounds: Normal breath sounds.   Abdominal:      General: There is distension.      Palpations: Abdomen is soft.      Tenderness: There is no abdominal tenderness.   Musculoskeletal:         General: Tenderness present.      Right lower leg: Edema present.      Left lower leg: Edema present.      Comments: Left AKA, mild edema,  examined during wound VAC exchange, wound with ongoing yellow slough, tender to palpation and mild erythema surrounding.  No tracking.    Skin:     General: Skin is warm.      Findings: Rash present.      Comments: Faint maculopapular rash, on back and trunk.  Dry, flaking skin noted particularly on back.  right leg with chronic skin changes edema, thickening and scaling.   Neurological:      General: No focal deficit present.      Mental Status: He is alert and oriented to person, place, and time.   Psychiatric:         Mood and Affect: Mood normal.         Behavior: Behavior normal.         Meds:    Current Facility-Administered Medications:   •  warfarin  •  insulin regular **AND** POC Blood Glucose **AND** NOTIFY MD and PharmD **AND** glucose **AND** dextrose 50%  •  micafungin  •  ipratropium-albuterol  •  morphine injection  •  mineral oil-pet hydrophilic  •  budesonide-formoterol  •  ALPRAZolam  •  QUEtiapine  •  MD Alert...Warfarin per Pharmacy  •  acetaminophen  •  oxyCODONE immediate-release **OR** oxyCODONE immediate-release  •  pneumococcal 13-Paradise Conj Vacc  •  influenza vaccine quad  •  ceFAZolin  •  amiodarone  •  ferrous sulfate  •  furosemide  •  metoprolol SR  •  rosuvastatin  •  spironolactone  •  senna-docusate **AND** polyethylene glycol/lytes **AND** magnesium hydroxide **AND** bisacodyl  •  Respiratory Therapy Consult    Labs:  Recent Labs     12/01/20 0121 12/02/20 0315   WBC 8.1 8.1   RBC 3.28* 3.28*   HEMOGLOBIN 9.6* 9.6*   HEMATOCRIT 32.4* 32.6*   MCV 98.8* 99.4*   MCH 29.3 29.3   RDW 70.8* 71.0*   PLATELETCT 195 200   MPV 10.3 10.9   NEUTSPOLYS 79.20* 75.60*   LYMPHOCYTES 8.40* 10.00*   MONOCYTES 7.70 9.40   EOSINOPHILS 3.30 3.60   BASOPHILS 0.50 0.50   RBCMORPHOLO  --  Present     Recent Labs     12/01/20 0121 12/02/20 0315   SODIUM 135 135   POTASSIUM 4.1 4.3   CHLORIDE 98 95*   CO2 29 32   GLUCOSE 114* 110*   BUN 14 16     Recent Labs     12/01/20 0121 12/02/20 0315   ALBUMIN 2.8*   --    TBILIRUBIN 0.4  --    ALKPHOSPHAT 92  --    TOTPROTEIN 7.7  --    ALTSGPT <5  --    ASTSGOT 13  --    CREATININE 0.79 0.85       Imaging:  Ct-abdomen With & W/o    Result Date: 11/25/2020 11/25/2020 9:55 PM HISTORY/REASON FOR EXAM:  History of pancreas lesion. TECHNIQUE/EXAM DESCRIPTION:  CT pancreas and abdomen without and with contrast. Initial precontrast thick-section images were obtained through the pancreas.  Following this, nonionic contrast was administered by IV, and thin-section helical scanning performed from the diaphragmatic domes through the iliac crests.  Pancreatic parenchymal phase and portal venous phase (dual-phase) images were obtained following contrast administration. 100 mL of Omnipaque 350 nonionic contrast was administered. Low dose optimization technique was utilized for this CT exam including automated exposure control and adjustment of the mA and/or kV according to patient size. COMPARISON: September 12, 2019 FINDINGS: There is bibasilar atelectasis with apparent trace right pleural fluid. There are old right rib fractures. There are coronary artery calcifications. The heart is enlarged. Initial noncontrast images reveal possible subtle gallstones dependently in the gallbladder. No pancreas calcifications identified. There is calcified plaque in the aorta and branch vessels. There is a nonobstructive right renal stone measuring 4 mm. With the administration of contrast the pancreas enhances homogeneously. The previously noted cystic structure has resolved most consistent with a pancreas pseudocyst. The pancreas is somewhat atrophic. The liver is heterogeneous with a nodular surface suggestive of cirrhosis. The spleen is enlarged measuring 19.6 cm anterior posteriorly. The main portal vein, splenic vein and superior mesenteric vein appear patent. There is a stable left adrenal gland nodule. The right adrenal gland is unremarkable. The kidneys enhance symmetrically. No  hydronephrosis. No ascites or lymphadenopathy. The visualized bowel is nondilated. There is partial visualization of a ventral hernia containing fat.     Resolution of cystic pancreas lesion most consistent with pancreatic pseudocyst. Cirrhotic appearing liver with splenomegaly. Nonobstructive right renal stone. Possible gallstones     Ct-extremity, Lower With Left    Result Date: 11/25/2020 11/25/2020 9:54 PM HISTORY/REASON FOR EXAM:  Persistent wound drainage; Persistent wound drainage from left AKA, evaluate for deep fluid collection. TECHNIQUE/EXAM DESCRIPTION AND NUMBER OF VIEWS:  CT scan of the LEFT lower extremity with contrast, with reconstructions. Thin helical 3 mm sections were obtained from the distal femur through the proximal tibia/fibula. Sagittal and coronal multiplanar reconstructions were generated from the axial images. A total of 100 mL of Omnipaque 350 nonionic contrast was administered  IV without complication. Up to date radiation dose reduction adjustments have been utilized to meet ALARA standards for radiation dose reduction. COMPARISON: None. FINDINGS: There is left thigh cutaneous edema. There is a fluid collection along the posterior aspect of the above the knee amputation. This measures approximately 7.8 x 2.3 x 5.3 cm. No obvious peripheral enhancement identified to suggest infection. No soft tissue gas identified. There are varicose veins identified.     Fluid collection along the distal posterior aspect of the above the knee amputation measuring 7.8 x 2.3 x 5.3 cm without significant peripheral enhancement to suggest abscess. Extensive left thigh subcutaneous edema.    Dx-chest-limited (1 View)    Result Date: 11/25/2020 11/25/2020 6:02 AM HISTORY/REASON FOR EXAM:  Shortness of Breath TECHNIQUE/EXAM DESCRIPTION AND NUMBER OF VIEWS: Single portable view of the chest. COMPARISON: 11/1/2020 FINDINGS: Cardiomediastinal silhouette is stable. Aortic calcified atherosclerotic plaque.  Sternotomy wires. There are diffuse interstitial opacities probably representing pulmonary edema. Infection should be excluded clinically. No significant pleural effusion or pneumothorax. No acute osseous abnormality.     Moderate bilateral interstitial opacities which likely represents pulmonary edema. Stable cardiac silhouette enlargement.    Us-extremity Artery Lower Unilat Left    Result Date: 2020  Lower Extremity  Arterial Duplex Report  Vascular Laboratory  CONCLUSIONS  Prior study 10/30/20  Triphasic waveforms seen at the common femoral, profunda, and proximal-mid  femoral artery.  JARRETT WHITE  Exam Date:     2020 12:54  Room #:     Inpatient  Priority:     Routine  Ht (in):             Wt (lb):  Ordering Physician:        SYMONE NUÑEZ  Referring Physician:       [id number] Rooney  Sonographer:               Sonam Mccarty RVT, RDMS  Study Type:                Complete Unilateral  Technical Quality:         Poor  Age:    54    Gender:     M  MRN:    [id number]  :    1965      BSA:  Indications:     Atherosclerosis of peripheral arteries  CPT Codes:       94218  ICD Codes:       440.20  History:         Left above the knee amputation. PVD. Prior study 10/30/20  Limitations:     Technically difficult study due to patient body habitus.                RIGHT  Waveform        Peak Systolic Velocity (cm/s)                  Prox    Prox-Mid  Mid    Mid-Dist  Distal                                                             CFA                                                             PFA                                                             SFA                                                             POP                                                             AT                                                             PT                                                             [place]                LEFT   Waveform        Peak Systolic Velocity (cm/s)                  Prox    Prox-Mid  Mid    Mid-Dist  Distal  Triphasic                         58                       CFA  Triphasic       49                                         PFA  Triphasic       60                54                       SFA                                                             POP                                                             AT                                                             PT                                                             [place]  FINDINGS  Left.  Triphasic waveforms seen at the common femoral, profunda, and proximal-mid  femoral artery.  Distal femoral artery not visualized.  Xiang Mendieta MD  (Electronically Signed)  Final Date:      2020                   15:25    Us-extremity Venous Lower Bilat    Result Date: 2020   Vascular Laboratory  CONCLUSIONS  No prior study is available for comparison.  Limited study, no obvious DVT.  JARRETT WHITE  Exam Date:     2020 12:43  Room #:     Inpatient  Priority:     Routine  Ht (in):             Wt (lb):  Ordering Physician:        SYMONE NUÑEZ  Referring Physician:       [id number] Rooney  Sonographer:               Sonam Mccarty RVT, RDMS  Study Type:                Limited Bilateral  Technical Quality:         Poor  Age:    54    Gender:     M  MRN:    [id number]  :    1965      BSA:  Indications:     Edema  CPT Codes:       25427  ICD Codes:       782.3  History:         Edema  Limitations:     Technically difficult study due to patient body habitus.  PROCEDURES:  Bilateral lower extremity venous duplex imaging.  The following venous structures were evaluated: common femoral, profunda  femoral, prroximal greater saphenous, femoral, popliteal , peroneal and  posterior tibial veins.  *Left above the knee amputation.  FINDINGS:  Right lower extremity -   Patient refused compressions at the mid femoral, distal femoral, and  popliteal vein. Complete color filling visualized.  Calf veins not visualized.  All other veins of the right lower extremity demonstrate normal flow  dynamics with no evidence of deep vein thrombosis.  Left lower extremity -  Left above the knee amputation.  Patient refused compressions at the common femoral and distal femoral vein.  Complete color filling visualized.  All other veins of the left lower extremity demonstrate normal flow  dynamics with no evidence of deep vein thrombosis.  Xiang Mendieta MD  (Electronically Signed)  Final Date:      2020                   14:00    Us-extremity Venous Upper Unilat Left    Result Date: 2020   Upper Extremity  Venous Duplex Report  Vascular Laboratory  CONCLUSIONS  No prior exam is available for comparison.  LEFT ARM  Subacute, partially occlusive superficial thrombus in the median cubital  vein.  No evidence of DVT.  JARRETT WHITE  Exam Date:     2020 05:37  Room #:     Inpatient  Priority:     Routine  Ht (in):             Wt (lb):  Ordering Physician:        WENDY PEARL  Referring Physician:       [id number] Cheng  Sonographer:               Feli Nice                             T, [facility]  Study Type:                Complete Unilateral  Technical Quality:         Adequate  Age:    54    Gender:     M  MRN:    [id number]  :    1965      BSA:  Indications:     Swelling of Limb, Pain in Limb  CPT Codes:       89439  ICD Codes:       729.81  729.5  History:         Left distal bicep pain, swelling, and erythema x 1 day  Limitations:  PROCEDURES:  Left upper extremity venous duplex imaging.  The following venous structures were evaluated: internal jugular,  subclavian, axillary, brachial, cephalic and basilic veins.  Serial compression, augmentation maneuvers,  color and spectral Doppler  flow evaluations were performed.  FINDINGS:  Left upper extremity.   Subacute, partially occlusive superficial thrombus in the median cubital  vein.  All other veins demonstrate complete color filling and compressibility with  normal venous flow dynamics including spontaneous flow, response to  augmentation maneuvers, and respiratory phasicity.  No deep venous thrombosis.  Flow was evaluated in the contralateral subclavian vein and normal venous  flow dynamics including spontaneous flow, respiratory phasic variation and  augmentation were demonstrated.  Jerry Ellison MD  (Electronically Signed)  Final Date:      2020                   07:45    Ec-echocardiogram Ltd W/ Cont    Result Date: 11/3/2020  Transthoracic Echo Report Echocardiography Laboratory CONCLUSIONS Compared to the images of the prior study done on 10/31/2020, no significant changes are noted. Left ventricular ejection fraction is visually estimated to be 50%. Mild aortic stenosis. Estimated right ventricular systolic pressure  is 40 mmHg. JARRETT WHITE Exam Date:         2020                    09:41 Exam Location:     Inpatient Priority:          Routine Ordering Physician:        BHAVIN BUENO                             ([id number]) Referring Physician:       XIMENA De Jesus Sonographer:               Analia Swan [facility] Age:    54     Gender:    M MRN:    [id number] :    1965 BSA:    2.84   Ht (in):    77     Wt (lb):    350 Exam Type:     Limited, Contrast Indications:     Congestive Heart Failure ICD Codes:       428 CPT Codes:       67343, , 35069, 67853 BP:   113    /   74     HR: Technical Quality:       Fair MEASUREMENTS  (Male / Female) Normal Values 2D ECHO Estimated LV Ejection Fraction    50 %                  IVC Diameter                      2.2 cm                DOPPLER AV Peak Velocity                  2.7 m/s               AV Peak Gradient                  30.1 mmHg             AV Mean Gradient                  18.1 mmHg             LVOT Peak Velocity                1.3  m/s               MV Velocity Time Integral         35.8 cm               MV Pressure Half Time             80 ms                 MV Area PHT                       2.8 cm2               TR Peak Velocity                  299 cm/s              * Indicates values subject to auto-interpretation LV EF:  50    % FINDINGS Left Ventricle 3 mL of contrast was administered. Existing IV was used. Contrast was used to enhance visualization of the endocardial border. Left ventricular systolic function is low normal. Left ventricular ejection fraction is visually estimated to be 50%. Right Ventricle Right Atrium Left Atrium Mitral Valve Mild mitral stenosis. Mean transvalvular gradient is 3  mmHg at a heart rate of 78  BPM. Aortic Valve Mild aortic stenosis. Vmax is  2.6 m/s. Transvalvular gradients are - Peak: 29 mmHg, Mean: 17 mmHg. Tricuspid Valve Mild tricuspid regurgitation. Right atrial pressure is estimated to be 3 mmHg. Estimated right ventricular systolic pressure  is 40 mmHg. Pulmonic Valve Pericardium Normal pericardium without effusion. Aorta Jerry Reyes MD (Electronically Signed) Final Date:     03 November 2020                 12:41    Ir-picc Line Placement W/ Guidance > Age 5    Result Date: 12/1/2020  HISTORY/REASON FOR EXAM:   PICC placement. TECHNIQUE/EXAM DESCRIPTION AND NUMBER OF VIEWS:   PICC line insertion with ultrasound guidance.  The procedure was performed using maximal sterile barrier technique including sterile gown, mask, cap, and donning of sterile gloves following appropriate hand hygiene and/or sterile scrub. Patient skin site was prepped with 2% Chlorhexidine solution. FINDINGS:  PICC line insertion with Ultrasound Guidance was performed by qualified nursing staff without the assistance of a Radiologist. PICC positioning appropriateness confirmed by 3CG technology; chest xray only needed in the instance 3CG unable to confirm placement.              Ultrasound-guided PICC placement  performed by qualified nursing staff as above.       Micro:  Results     Procedure Component Value Units Date/Time    Anaerobic Culture [[id number]] Collected: 11/26/20 1007    Order Status: Completed Specimen: Tissue Updated: 12/01/20 0903     Significant Indicator NEG     Source TISS     Site Left Stump wound#2     Culture Result No Anaerobes isolated.    Narrative:      Surgery Specimen    CULTURE TISSUE W/ GRM STAIN [[id number]]  (Abnormal) Collected: 11/26/20 1007    Order Status: Completed Specimen: Tissue Updated: 12/01/20 0903     Significant Indicator POS     Source TISS     Site Left Stump wound#2     Culture Result Growth noted after further incubation, see below for  organism identification.       Gram Stain Result Rare epithelial cells.  No organisms seen.       Culture Result Candida parapsilosis  Rare growth      Narrative:      Surgery Specimen    CULTURE WOUND W/ GRAM STAIN [[id number]]  (Abnormal) Collected: 11/26/20 1007    Order Status: Completed Specimen: Wound Updated: 12/01/20 0902     Significant Indicator POS     Source WND     Site Left Stump wound #1     Culture Result Growth noted after further incubation, see below for  organism identification.       Gram Stain Result Rare WBCs.  No organisms seen.       Culture Result Candida parapsilosis  Rare growth      Narrative:      Surgery - swabs received    Anaerobic Culture [[id number]] Collected: 11/26/20 1007    Order Status: Completed Specimen: Wound Updated: 12/01/20 0902     Significant Indicator NEG     Source WND     Site Left Stump wound #1     Culture Result No Anaerobes isolated.    Narrative:      Surgery - swabs received    BLOOD CULTURE [[id number]] Collected: 11/25/20 1431    Order Status: Completed Specimen: Blood from Peripheral Updated: 11/30/20 1700     Significant Indicator NEG     Source BLD     Site PERIPHERAL     Culture Result No growth after 5 days of incubation.    Narrative:      Per Hospital Policy: Only change Specimen  "Src: to \"Line\" if  specified by physician order.  Right Hand    BLOOD CULTURE [[id number]] Collected: 11/25/20 1013    Order Status: Completed Specimen: Blood from Peripheral Updated: 11/30/20 1300     Significant Indicator NEG     Source BLD     Site PERIPHERAL     Culture Result No growth after 5 days of incubation.    Narrative:      Per Hospital Policy: Only change Specimen Src: to \"Line\" if  specified by physician order.  Right Hand    GRAM STAIN [[id number]] Collected: 11/26/20 1007    Order Status: Completed Specimen: Tissue Updated: 11/26/20 1322     Significant Indicator .     Source TISS     Site Left Stump wound#2     Gram Stain Result Rare epithelial cells.  No organisms seen.      Narrative:      Surgery Specimen    GRAM STAIN [[id number]] Collected: 11/26/20 1007    Order Status: Completed Specimen: Wound Updated: 11/26/20 1321     Significant Indicator .     Source WND     Site Left Stump wound #1     Gram Stain Result Rare WBCs.  No organisms seen.      Narrative:      Surgery - swabs received    CoV-2, Flu A/B, And RSV by PCR [[id number]] Collected: 11/25/20 0140    Order Status: Completed Updated: 11/25/20 1238     Influenza virus A RNA Negative     Influenza virus B, PCR Negative     RSV, PCR Negative     SARS-CoV-2 by PCR NotDetected     Comment: PATIENTS: Important information regarding your results and instructions can  be found at [email or web address]   \"After your  Covid-19 Test\"  [facility] providers: PLEASE REFER TO DE-ESCALATION AND RETESTING PROTOCOL  on [email or web address]  **The [employer] GeneXpert Xpress SARS-CoV-2 Test has been made available for  use under the Emergency Use Authorization (EUA) only.          SARS-CoV-2 Source NP Swab    Narrative:      Collected By:[id number] JOCELYN PARTIDA  Is patient being admitted?->Yes  Does this patient meet criteria for Rush/Cepheid per [facility]  Inpatient Workflow? (See workflow link below)->No  Expected turn around time?->Routine " (In-House PCR up to 24  hours)  Have you been in close contact with a person who is suspected  or known to be positive for COVID-19 within the last 30 days  (e.g. last seen that person < 30 days ago)->Unknown          Assessment:  Active Hospital Problems    Diagnosis   • *Soft tissue infection [L08.9]   • Paroxysmal atrial fibrillation (HCC) [I48.0]   • COPD (chronic obstructive pulmonary disease) (HCC) [J44.9]   • Pulmonary HTN (HCC) [I27.20]   • Acute exacerbation of CHF (congestive heart failure) (HCC) [I50.9]   • Type 2 diabetes mellitus with complication, without long-term current use of insulin (HCC) [E11.8]   • CKD (chronic kidney disease), stage III [N18.30]   • SHASHANK (obstructive sleep apnea) [G47.33]   • Essential hypertension [I10]   • Hematoma of arm, right, initial encounter [S40.021A]     Interval 24 hours:      AF, O2 3 L NC,stable   Labs reviewed  Micro reviewed    Patient going tenderness at wound VAC site.  The wound VAC removal was extremely painful for him.  Wound still with yellow slough.  No significant drainage or tracking.  Patient continued on micafungin and cefazolin.  Rash continues to improve.       Assessment/Hospital Course:  54-year-old male with known atrial fibrillation, methamphetamine abuse, CAD, PVD, transferred from a hospital in [place] on 11/24/2020 following an admission for CHF exacerbation.  Earlier this month, patient was admitted here with group A strep bacteremia and bilateral lower extremity necrotizing cellulitis with cultures growing group A strep, MSSA.  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  During his admission at the [facility], reportedly some purulent drainage was noted from his poorly functioning wound VAC, thus he was transferred here.  CT scan showed a fluid collection along the distal posterior aspect of the AKA measuring 7.8 x 2.3 x 5.3 cm, this was not peripherally enhancing.  Per verbal report, purulent  drainage was also noted at the amputation site here, prior to I&D. Patient was taken to the OR on 11/26 for I&D, gross appearance of liquefied hematoma was noted.  Upper extremity ultrasound on 11/28 with subacute partially occlusive superficial thrombus in the median cubital vein.     Left AKA stump infection  -The wound still with some slough and surrounding erythema and tenderness.    Rash-presume this is due to fluconazole as it started after this medication was initiated.  Improving since fluconazole stopped  Antibiotic allergies-penicillin allergy reported but per chart has tolerated cephalosporins     Plan:  -Reports of purulence at amputation site prior to I&D both here by my colleague as well as per reports at outside hospital acknowledged. Continue IV Ancef 2g q8h for now to cover previously grown organisms given likely infected hematoma.  Will continue on current antibiotics but if does not improve on next wound VAC change may broaden.  Prior cultures from the site only grew yeast but were taken after he had been given vancomycin Zosyn and cefazolin which would limit yield.   -Requested sensitivities for the Candida - micro will initiate   -Given proximity to residual femur, will likely treat with an extended course of antibiotics for 6 weeks (end 2/10/20). 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  -Multiple OR cultures growing yeast, identification awaited.    Due to new rash that appears to coincide with starting fluconazole will stop and transition to micafungin and monitor.  Rash ongoing but some improvement today suggesting the fluconazole was the cause.   -Given poor compliance and methamphetamine abuse, best course of action would be placement at a facility with continued wound care and IV antibiotics.   -Okay to place PICC line  -Final recs  after next wound VAC exchange on Friday     Discussed with microbiology, wound care team and examined wound at bedside during VAC exchange ID will follow. Please call with questions.

## 2020-12-02 NOTE — PROGRESS NOTES
"   Orthopaedic PA Progress Note    Interval changes:Did well overnight . Gram stain - no orgs. CX + Candida  Patient requests motorized WC - deferred to attending. Wants to talk to Attending about bedsores. Rash better now off fluconazole    ROS - Patient denies any new issues. No chest pain, dyspnea, or fever.  Pain well controlled.    /86   Pulse 78   Temp 36.7 °C (98.1 °F) (Temporal)   Resp 18   Ht 1.956 m (6' 5\")   Wt (!) 160.5 kg (353 lb 13.4 oz)   SpO2 91%     Patient seen and examined  No acute distress  Breathing non labored  RRR  LLE Vac intact, dressing intact,prox compartments soft    Recent Labs     12/01/20  0121 12/02/20  0315   WBC 8.1 8.1   RBC 3.28* 3.28*   HEMOGLOBIN 9.6* 9.6*   HEMATOCRIT 32.4* 32.6*   MCV 98.8* 99.4*   MCH 29.3 29.3   MCHC 29.6* 29.4*   RDW 70.8* 71.0*   PLATELETCT 195 200   MPV 10.3 10.9       Active Hospital Problems    Diagnosis   • Soft tissue infection [L08.9]     Priority: High   • Paroxysmal atrial fibrillation (HCC) [I48.0]     Priority: High   • COPD (chronic obstructive pulmonary disease) ([facility]) [J44.9]     Priority: High   • Pulmonary HTN ([facility]) [I27.20]     Priority: Medium   • Acute exacerbation of CHF (congestive heart failure) ([facility]) [I50.9]     Priority: Medium   • Type 2 diabetes mellitus with complication, without long-term current use of insulin ([facility]) [E11.8]     Priority: Medium   • CKD (chronic kidney disease), stage III [N18.30]     Priority: Low   • SHASHANK (obstructive sleep apnea) [G47.33]     Priority: Low   • Essential hypertension [I10]     Priority: Low   • Hematoma of arm, right, initial encounter [S40.021A]       Assessment/Plan:  POD#6 S/P I+D LLE,  L AKA hx, application wound vac  Wt bearing status - NWB LLE  PT/OT-initiated  Wound care:wound care to follow, change vacs 3xWkly  to secondary closure  Drains - no  Ceballos-no  Sutures/Staples out- 10-14 days post operatively  Antibiotics: Per ID. Consult note read and appreciated  DVT Prophylaxis- " TEDS/SCDs/Foot pumps.   Heparin gtt per Med  Future Procedures - not anticipated  Case Coordination for Discharge Planning - Disposition pending Abx/AF plan, wound care plan, likely Rehab vs. SNF vs LTAC. [place] Resident. Hx AMA departure and mult. Complaints re. Prior nursing care.

## 2020-12-02 NOTE — PROGRESS NOTES
Hospital Medicine Daily Progress Note    Date of Service  12/2/2020    Chief Complaint  54 y.o. male admitted 11/24/2020 with left AKA wound infection     Hospital Course  A 54 y.o. male with PMH of CAD, DM, COPD, CHF, and A-fib who had recent left AKA on 11/12 who left AMA from hospital, admitted on 11/24/20 as a transfer from [place], [place] for left AKA wound infection and shortness of breath secondary to CHF. ID & Ortho on board. On cefazolin 2g IV Q8H based on prior sensitivities. Ortho I & D left AKA wound on 11/26. ID added fluconazole on 11/29 for  OR cultures growing yeast    Interval Problem Update  No acute complaints this morning.  He continues on cefazolin 2 g every 8 hours.  Saturating 91% on 3 L of oxygen, but denies shortness of breath.  Denies chest pain.  Requesting more food.     Consultants/Specialty  ID   Infectious disease    Code Status  Full Code    Disposition  [facility]  Needs placement for long term IV Abx    Review of Systems  Review of Systems   Constitutional: Negative for chills, fever and malaise/fatigue.   HENT: Negative for congestion, ear discharge, ear pain, sinus pain and sore throat.    Eyes: Negative for blurred vision and double vision.   Respiratory: Negative for cough (resolved), sputum production, shortness of breath (resolved) and wheezing.    Cardiovascular: Positive for leg swelling. Negative for chest pain and palpitations.   Gastrointestinal: Negative for abdominal pain, constipation, diarrhea, nausea and vomiting.   Genitourinary: Negative for dysuria, frequency and urgency.   Musculoskeletal: Positive for joint pain. Negative for myalgias.   Neurological: Positive for weakness (diffuse). Negative for dizziness (resolved), focal weakness and headaches.   Psychiatric/Behavioral: The patient is not nervous/anxious.         Physical Exam  Temp:  [36.1 °C (97 °F)-37.1 °C (98.7 °F)] 36.1 °C (97 °F)  Pulse:  [62-72] 70  Resp:  [17-20] 19  BP: ()/(48-73)  96/48  SpO2:  [91 %-98 %] 91 %    Physical Exam  Constitutional:       General: He is not in acute distress.     Appearance: He is obese.   HENT:      Head: Normocephalic and atraumatic.      Nose: Nose normal.      Mouth/Throat:      Mouth: Mucous membranes are moist.      Pharynx: No posterior oropharyngeal erythema.   Eyes:      General: No scleral icterus.     Extraocular Movements: Extraocular movements intact.      Conjunctiva/sclera: Conjunctivae normal.      Pupils: Pupils are equal, round, and reactive to light.   Neck:      Musculoskeletal: Normal range of motion and neck supple. No neck rigidity.   Cardiovascular:      Rate and Rhythm: Normal rate. Rhythm irregular.      Pulses: Normal pulses.      Heart sounds: No murmur. No gallop.    Pulmonary:      Effort: Pulmonary effort is normal.      Breath sounds: Normal breath sounds. No stridor. No wheezing, rhonchi or rales.   Abdominal:      General: Bowel sounds are normal. There is distension.      Palpations: Abdomen is soft.      Tenderness: There is no abdominal tenderness. There is no guarding.   Musculoskeletal:         General: Swelling present. No tenderness.      Comments: Left AKA amputation  S/p I&D on left AKA wound, wound VAC in placed    RUE erythema & crusted areas   Skin:     General: Skin is warm.      Comments: Chronic venous stasis changes on her right lower extremity   Neurological:      General: No focal deficit present.      Mental Status: He is alert and oriented to person, place, and time.   Psychiatric:         Mood and Affect: Mood normal.         Behavior: Behavior normal.         Fluids    Intake/Output Summary (Last 24 hours) at 12/2/2020 0735  Last data filed at 12/2/2020 0200  Gross per 24 hour   Intake --   Output 700 ml   Net -700 ml       Laboratory  Recent Labs     12/01/20  0121 12/02/20  0315   WBC 8.1 8.1   RBC 3.28* 3.28*   HEMOGLOBIN 9.6* 9.6*   HEMATOCRIT 32.4* 32.6*   MCV 98.8* 99.4*   MCH 29.3 29.3   MCHC 29.6*  29.4*   RDW 70.8* 71.0*   PLATELETCT 195 200   MPV 10.3 10.9     Recent Labs     12/01/20  0121 12/02/20  0315   SODIUM 135 135   POTASSIUM 4.1 4.3   CHLORIDE 98 95*   CO2 29 32   GLUCOSE 114* 110*   BUN 14 16   CREATININE 0.79 0.85   CALCIUM 9.1 9.3     Recent Labs     11/30/20  0059 12/01/20  0121 12/02/20  0315   INR 2.17* 2.57* 3.05*               Imaging  IR-PICC LINE PLACEMENT W/ GUIDANCE > AGE 5   Final Result                  Ultrasound-guided PICC placement performed by qualified nursing staff as    above.          US-EXTREMITY VENOUS UPPER UNILAT LEFT   Final Result      CT-EXTREMITY, LOWER WITH LEFT   Final Result      Fluid collection along the distal posterior aspect of the above the knee amputation measuring 7.8 x 2.3 x 5.3 cm without significant peripheral enhancement to suggest abscess.      Extensive left thigh subcutaneous edema.      CT-ABDOMEN WITH & W/O   Final Result      Resolution of cystic pancreas lesion most consistent with pancreatic pseudocyst.      Cirrhotic appearing liver with splenomegaly.      Nonobstructive right renal stone.      Possible gallstones            US-EXTREMITY ARTERY LOWER UNILAT LEFT   Final Result      US-EXTREMITY VENOUS LOWER BILAT   Final Result      DX-CHEST-LIMITED (1 VIEW)   Final Result      Moderate bilateral interstitial opacities which likely represents pulmonary edema.      Stable cardiac silhouette enlargement.           Assessment/Plan  * Soft tissue infection- (present on admission)  Assessment & Plan  s/p initial left intitial knee amputation on 11/2/20 with Dr. Durbin, followed by completion of left AKA on 11/12.  Patient left [facility] AMA on 11/17  Presented to [facility] on 11/20 with purulent drainage from wound vac insertion site, with wound vac removal at outside hospital.   - Infectious Disease and orthopedic Sx on board  - on cefazolin 2g IV Q8H based on prior sensitivities  -I&D by orthopedic Sx on 11/26, wound VAC placed  -Pain control  - ID on  board, continue Ancef x 6 weeks (end date 2/10/2020). But if the patient refuses, recommend p.o. Augmentin 875 mg twice daily for 6 weeks, along with outpatient wound care  ID added fluconazole on 11/29 for OR cultures growing yeast.  On 11/30: ID stop fluconazole & started on Micafungin for concerning new rash.   PICC placed 12/1/2020.  Needs placement for long term IV Abx    Paroxysmal atrial fibrillation (HCC)- (present on admission)  Assessment & Plan   Continue Amiodarone 200mg QAM & metoprolol   [id number] 3    INR 2.57 on 12/1    COPD (chronic obstructive pulmonary disease) ([facility])- (present on admission)  Assessment & Plan  - COPD exacerbation on admission  - PFTS from 2018 revealed FVC 37% predicted.  FEV1 35% predicted.  No   significant response to bronchodilators.  FEV1/FVC is 73. Total lung capacity 5.29,     Diffusion DLCO 41% predicted indicating mixed obstructive and restrictive disease  - wheezing on exam and c/o shortness of breath  -Chest x-ray showed pulmonary edema  -Oxygen per protocol  -RT protocol with inhalers  - Acapella treatment  - will avoid systemic steroids at this time due to active soft tissue infection    Pulmonary HTN ([facility])  Assessment & Plan  Estimated right ventricular systolic pressure  is 40 mmHg on echo on 11/2  - likely secondary to CHF, COPD and SHASHANK  - continue to manage COPD and CHF exacerbation    Acute exacerbation of CHF (congestive heart failure) ([facility])- (present on admission)  Assessment & Plan  PMH of CAD and NSTEMI  Patient recently admitted to [facility] found to have CHF exacerbation  - Last echo on 11/2  notable for LVEF 50%, Mild aortic stenosis,   Toprol 25mg QAM  Rosuvastatin 40mg daily  Lasix 40mg daily  Aldactone 25mg daily  Daily weights and I/Os  - Resolved.  Back to home dose of furosemide.      Type 2 diabetes mellitus with complication, without long-term current use of insulin ([facility])- (present on admission)  Assessment & Plan  - A1c 7.8 on 10/30/2020  -  Correctional insulin with hypoglycemia protocol  - DM diet    Hematoma of arm, right, initial encounter  Assessment & Plan  US RUE 11/27: a resolving hematoma in the right forearm  Wound care consulted.     SHASHANK (obstructive sleep apnea)- (present on admission)  Assessment & Plan  CPAP per RT    CKD (chronic kidney disease), stage III- (present on admission)  Assessment & Plan  - continue to monitor  - Creatinine at baseline at 0.69    Essential hypertension- (present on admission)  Assessment & Plan  Continue home medications with holding parameters       VTE prophylaxis: SCDs & warfarin

## 2020-12-02 NOTE — DISCHARGE PLANNING
"Anticipated Discharge Disposition: SNF v LTAC. Per ID note \"best course of action would be placement at a facility with continued wound care and IV antibiotics.\"     Action: Per previous conversation, patient states his family is in the process of moving to [place], [place] and he will only go to a facility if it is near them. Otherwise, he states he wants to go home. [facility], which are still pending. [facility] to follow up.    Per chart review:     ID -\"Continue IV Ancef 2g q8h to cover previously grown organisms given likely infected hematoma  -Given proximity to residual femur, will likely treat with an extended course of antibiotics for 6 weeks (end 2/10/20) we will plan on treating with both cefazolin and micafungin.  Typically may extend oral antibiotics for ongoing infection with osteomyelitis patient is not tolerating fluconazole so will likely give 6-week course and then monitor.  -Multiple OR cultures growing yeast, identification awaited. Due to new rash that appears to coincide with starting fluconazole will stop and transition to micafungin and monitor.    Rash ongoing but some improvement today suggesting the fluconazole was the cause.   -Given poor compliance and methamphetamine abuse, best course of action would be placement at a facility with continued wound care and IV antibiotics.   -Okay to place PICC line  -Will attempt to view wound during next VAC change\"    1300- RN CM received update that [facility] has declined this patient as he is does not meet ICU/Tele nights requirement. RN CM to patient's bedside to discuss backup plan if [facility] declines. Patient requested that if they do decline him, that we speak to his wife regarding a back up plan    Hospitalist- \"ID on board, continue Ancef x 6 weeks. But if the patient refuses, recommend p.o. Augmentin 875 mg twice daily for 6 weeks, along with outpatient wound care\"    Barriers to Discharge: SNF/LTAC " acceptance     Plan: Follow and assist

## 2020-12-02 NOTE — DISCHARGE PLANNING
Agency/Facility Name: [facility]  Outcome: Left a vmail for admissions regarding reception of referral

## 2020-12-02 NOTE — DISCHARGE PLANNING
Facility: [facility]  [address] [place] [address], [place] Clinical Liaison: [phone]  Outcome: Per Jos[place],  is still reviewing this case.

## 2020-12-02 NOTE — CARE PLAN
Problem: Safety  Goal: Will remain free from injury  Outcome: PROGRESSING AS EXPECTED     Problem: Infection  Goal: Will remain free from infection  Outcome: PROGRESSING AS EXPECTED     Problem: Pain Management  Goal: Pain level will decrease to patient's comfort goal  Outcome: PROGRESSING AS EXPECTED     Patient remained free from injury/falls this shift; up max assist, call bell within reach. Pain well controlled with PRN medications. Morphine given prior to wound vac change today. BS checked ACHS and covered appropriately. IV abx administered per orders; L PICC line, +blood return. Awaiting placement for IV abx. Will continue to monitor.

## 2020-12-03 LAB
BASOPHILS # BLD AUTO: 0.6 % (ref 0–1.8)
BASOPHILS # BLD: 0.05 K/UL (ref 0–0.12)
EOSINOPHIL # BLD AUTO: 0.35 K/UL (ref 0–0.51)
EOSINOPHIL NFR BLD: 4.3 % (ref 0–6.9)
ERYTHROCYTE [DISTWIDTH] IN BLOOD BY AUTOMATED COUNT: 71.2 FL (ref 35.9–50)
GLUCOSE BLD-MCNC: 110 MG/DL (ref 65–99)
GLUCOSE BLD-MCNC: 146 MG/DL (ref 65–99)
GLUCOSE BLD-MCNC: 182 MG/DL (ref 65–99)
GLUCOSE BLD-MCNC: 97 MG/DL (ref 65–99)
HCT VFR BLD AUTO: 32.2 % (ref 42–52)
HGB BLD-MCNC: 9.6 G/DL (ref 14–18)
IMM GRANULOCYTES # BLD AUTO: 0.14 K/UL (ref 0–0.11)
IMM GRANULOCYTES NFR BLD AUTO: 1.7 % (ref 0–0.9)
INR PPP: 2.81 (ref 0.87–1.13)
LYMPHOCYTES # BLD AUTO: 0.67 K/UL (ref 1–4.8)
LYMPHOCYTES NFR BLD: 8.3 % (ref 22–41)
MCH RBC QN AUTO: 29.6 PG (ref 27–33)
MCHC RBC AUTO-ENTMCNC: 29.8 G/DL (ref 33.7–35.3)
MCV RBC AUTO: 99.4 FL (ref 81.4–97.8)
MONOCYTES # BLD AUTO: 0.58 K/UL (ref 0–0.85)
MONOCYTES NFR BLD AUTO: 7.2 % (ref 0–13.4)
NEUTROPHILS # BLD AUTO: 6.31 K/UL (ref 1.82–7.42)
NEUTROPHILS NFR BLD: 77.9 % (ref 44–72)
NRBC # BLD AUTO: 0 K/UL
NRBC BLD-RTO: 0 /100 WBC
PLATELET # BLD AUTO: 191 K/UL (ref 164–446)
PMV BLD AUTO: 10.7 FL (ref 9–12.9)
PROTHROMBIN TIME: 30.5 SEC (ref 12–14.6)
RBC # BLD AUTO: 3.24 M/UL (ref 4.7–6.1)
WBC # BLD AUTO: 8.1 K/UL (ref 4.8–10.8)

## 2020-12-03 PROCEDURE — 99232 SBSQ HOSP IP/OBS MODERATE 35: CPT | Performed by: STUDENT IN AN ORGANIZED HEALTH CARE EDUCATION/TRAINING PROGRAM

## 2020-12-03 PROCEDURE — 94760 N-INVAS EAR/PLS OXIMETRY 1: CPT

## 2020-12-03 PROCEDURE — 700111 HCHG RX REV CODE 636 W/ 250 OVERRIDE (IP): Performed by: INTERNAL MEDICINE

## 2020-12-03 PROCEDURE — 700101 HCHG RX REV CODE 250: Performed by: STUDENT IN AN ORGANIZED HEALTH CARE EDUCATION/TRAINING PROGRAM

## 2020-12-03 PROCEDURE — 94640 AIRWAY INHALATION TREATMENT: CPT

## 2020-12-03 PROCEDURE — 700102 HCHG RX REV CODE 250 W/ 637 OVERRIDE(OP): Performed by: STUDENT IN AN ORGANIZED HEALTH CARE EDUCATION/TRAINING PROGRAM

## 2020-12-03 PROCEDURE — 85025 COMPLETE CBC W/AUTO DIFF WBC: CPT

## 2020-12-03 PROCEDURE — 82962 GLUCOSE BLOOD TEST: CPT | Mod: 91

## 2020-12-03 PROCEDURE — 770009 HCHG ROOM/CARE - ONCOLOGY SEMI PRI*

## 2020-12-03 PROCEDURE — A9270 NON-COVERED ITEM OR SERVICE: HCPCS | Performed by: STUDENT IN AN ORGANIZED HEALTH CARE EDUCATION/TRAINING PROGRAM

## 2020-12-03 PROCEDURE — 85610 PROTHROMBIN TIME: CPT

## 2020-12-03 PROCEDURE — 700105 HCHG RX REV CODE 258: Performed by: INTERNAL MEDICINE

## 2020-12-03 RX ADMIN — FUROSEMIDE 40 MG: 40 TABLET ORAL at 06:03

## 2020-12-03 RX ADMIN — AMIODARONE HYDROCHLORIDE 200 MG: 200 TABLET ORAL at 06:03

## 2020-12-03 RX ADMIN — WARFARIN SODIUM 5 MG: 5 TABLET ORAL at 17:20

## 2020-12-03 RX ADMIN — OXYCODONE HYDROCHLORIDE 10 MG: 10 TABLET ORAL at 07:56

## 2020-12-03 RX ADMIN — METOPROLOL SUCCINATE 25 MG: 25 TABLET, EXTENDED RELEASE ORAL at 06:03

## 2020-12-03 RX ADMIN — QUETIAPINE FUMARATE 12.5 MG: 25 TABLET ORAL at 06:03

## 2020-12-03 RX ADMIN — SPIRONOLACTONE 25 MG: 25 TABLET ORAL at 06:03

## 2020-12-03 RX ADMIN — OXYCODONE HYDROCHLORIDE 10 MG: 10 TABLET ORAL at 20:47

## 2020-12-03 RX ADMIN — IPRATROPIUM BROMIDE AND ALBUTEROL SULFATE 3 ML: .5; 3 SOLUTION RESPIRATORY (INHALATION) at 12:17

## 2020-12-03 RX ADMIN — Medication: at 10:16

## 2020-12-03 RX ADMIN — ALPRAZOLAM 0.5 MG: 0.5 TABLET ORAL at 20:47

## 2020-12-03 RX ADMIN — ROSUVASTATIN CALCIUM 40 MG: 20 TABLET, FILM COATED ORAL at 06:03

## 2020-12-03 RX ADMIN — MICAFUNGIN SODIUM 100 MG: 20 INJECTION, POWDER, LYOPHILIZED, FOR SOLUTION INTRAVENOUS at 13:16

## 2020-12-03 RX ADMIN — CEFAZOLIN SODIUM 2 G: 2 INJECTION, SOLUTION INTRAVENOUS at 07:55

## 2020-12-03 RX ADMIN — DOCUSATE SODIUM 50 MG AND SENNOSIDES 8.6 MG 2 TABLET: 8.6; 5 TABLET, FILM COATED ORAL at 17:20

## 2020-12-03 RX ADMIN — IPRATROPIUM BROMIDE AND ALBUTEROL SULFATE 3 ML: .5; 3 SOLUTION RESPIRATORY (INHALATION) at 21:45

## 2020-12-03 RX ADMIN — CEFAZOLIN SODIUM 2 G: 2 INJECTION, SOLUTION INTRAVENOUS at 00:46

## 2020-12-03 RX ADMIN — CEFAZOLIN SODIUM 2 G: 2 INJECTION, SOLUTION INTRAVENOUS at 15:55

## 2020-12-03 RX ADMIN — CEFAZOLIN SODIUM 2 G: 2 INJECTION, SOLUTION INTRAVENOUS at 23:15

## 2020-12-03 RX ADMIN — FERROUS SULFATE TAB 325 MG (65 MG ELEMENTAL FE) 325 MG: 325 (65 FE) TAB at 06:03

## 2020-12-03 RX ADMIN — BUDESONIDE AND FORMOTEROL FUMARATE DIHYDRATE 2 PUFF: 80; 4.5 AEROSOL RESPIRATORY (INHALATION) at 17:20

## 2020-12-03 RX ADMIN — BUDESONIDE AND FORMOTEROL FUMARATE DIHYDRATE 2 PUFF: 80; 4.5 AEROSOL RESPIRATORY (INHALATION) at 06:00

## 2020-12-03 RX ADMIN — Medication: at 20:58

## 2020-12-03 RX ADMIN — OXYCODONE HYDROCHLORIDE 10 MG: 10 TABLET ORAL at 15:55

## 2020-12-03 ASSESSMENT — ENCOUNTER SYMPTOMS
WHEEZING: 0
BLURRED VISION: 0
DIARRHEA: 0
COUGH: 0
DOUBLE VISION: 0
FEVER: 0
NERVOUS/ANXIOUS: 0
HEADACHES: 0
NAUSEA: 0
PALPITATIONS: 0
SPUTUM PRODUCTION: 0
WEAKNESS: 1
ABDOMINAL PAIN: 0
DIZZINESS: 0
CHILLS: 0
SORE THROAT: 0
SINUS PAIN: 0
CONSTIPATION: 0
FOCAL WEAKNESS: 0
MYALGIAS: 0
SHORTNESS OF BREATH: 0
VOMITING: 0

## 2020-12-03 ASSESSMENT — PAIN DESCRIPTION - PAIN TYPE
TYPE: ACUTE PAIN
TYPE: ACUTE PAIN;SURGICAL PAIN
TYPE: ACUTE PAIN

## 2020-12-03 ASSESSMENT — FIBROSIS 4 INDEX: FIB4 SCORE: 1.8

## 2020-12-03 NOTE — DISCHARGE PLANNING
"Anticipated Discharge Disposition: SNF v LTAC. Per ID note \"best course of action would be placement at a facility with continued wound care and IV antibiotics.\"     Action: Per previous conversation, patient wants RN CM to speak to wife regarding backup plan, since patient was declined at SNF/LTAC close to his home. Patient's wife consented to sending blanket referrals to local SNFs/LTACs. RN CM to patients bedside to update on conversation with wife. Patient agreeable. Faxed choice to [facility].     Hospitalist- \"ID on board, continue Ancef x 6 weeks. But if the patient refuses, recommend p.o. Augmentin 875 mg twice daily for 6 weeks, along with outpatient wound care\"    Barriers to Discharge: SNF/LTAC acceptance     Plan: Follow and assist            "

## 2020-12-03 NOTE — DISCHARGE PLANNING
Agency/Facility Name: [facility]  Spoke To: basil from [facility]  Outcome: left new fax number to send referral to.    [phone]- Referral sent

## 2020-12-03 NOTE — DISCHARGE PLANNING
Received Choice form at 1400  Agency/Facility Name: [facility]  Referral sent per Choice form at 1415

## 2020-12-03 NOTE — WOUND TEAM
"[facility]  Inpatient Services  Wound and Skin Care Progress Note    Admission Date:  11/24/2020   HPI, PMH, SH: Reviewed  Unit where seen by Wound Team: [id number]    WOUND CONSULT RELATED TO:  Scheduled Negative Pressure Wound Therapy (NPWT) dressing change.      SUBJECTIVE: \"I don't want to cry, but it hurts so badly.\"      Self Report / Pain Level:  10/10. Premedicated IV by RN, definitely needs PO as well.    OBJECTIVE:  Previous dressing intact. Patient on pressure redistribution mattress with waffle overlay. Moves self.     WOUND TYPE, LOCATION, CHARACTERISTICS (Pressure ulcers: location, stage, POA or date identified)    Negative Pressure Wound Therapy 11/26/20 Surgical Thigh (Active)   NPWT Pump Mode / Pressure Setting Ulta;Continuous;125 mmHg    Dressing Type Small;Black Foam (Regular)    Number of Foam Pieces Used 2    Canister Changed No    Output (mL) 0 mL    NEXT Dressing Change/Treatment Date 12/04/20      Wound 11/26/20 Full Thickness Wound Thigh Lateral Left open surgical site (Active)   Wound Image      Site Assessment Pink;Red    Periwound Assessment Blanchable erythema;Clean;Dry;Intact    Margins Defined edges;Attached edges    Closure Secondary intention    Drainage Amount Scant    Drainage Description Serosanguineous    Treatments Cleansed;Irrigation;Site care    Wound Cleansing Approved Wound Cleanser    Periwound Protectant Skin Protectant Wipes to Periwound;Drape    Dressing Cleansing/Solutions Not Applicable    Dressing Options Wound Vac    Dressing Changed Observed    Dressing Status Clean;Dry;Intact    Dressing Change/Treatment Frequency Monday, Wednesday, Friday, and As Needed    NEXT Dressing Change/Treatment Date 12/04/20    NEXT Weekly Photo (Inpatient Only) 12/04/20    Non-staged Wound Description Full thickness    Wound Length (cm) 6.5 cm    Wound Width (cm) 2 cm    Wound Depth (cm) 2 cm    Wound Surface Area (cm^2) 13 cm^2    Wound Volume (cm^3) 26 cm^3    Tunneling " (cm) 5.5 cm    Tunneling Clock Position of Wound 6    Shape Irregular    Wound Odor None    Pulses N/A    Exposed Structures None    WOUND NURSE ONLY - Time Spent with Patient (mins) 60              Vascular: N/A    Lab Values     Results for JARRETT WHITE (MRN [id number]) as of 11/28/2020 11:30   Ref. Range 11/27/2020 04:17 11/28/2020 01:01   WBC Latest Ref Range: 4.8 - 10.8 K/uL 9.1    RBC Latest Ref Range: 4.70 - 6.10 M/uL 3.15 (L)    Hemoglobin Latest Ref Range: 14.0 - 18.0 g/dL 9.2 (L) 9.4 (L)   Hematocrit Latest Ref Range: 42.0 - 52.0 % 31.7 (L) 32.3 (L)     AIC:      Lab Results   Component Value Date/Time    HBA1C 7.8 (H) 10/30/2020 06:53 AM         Culture:   Completed 11/26/20    INTERVENTIONS BY WOUND TEAM: Chart reviewed, patient seen with Dr. Barber from ID.   Soaked foam with NS prior to removal. Used adhesive remover to remove drape from skin, then removed foam which was very painful for patient.  Cleaned with NS. Applied No Sting and and drape to periwound skin. One strip of foam into wound bed and tract, the one piece for TRAC pad. Sealed with drape. Resumed VAC at 125 mmHg continuous, no leaks noted.     Interdisciplinary consultation: Patient, Bedside RN, Dr. Barber (ID)     EVALUATION:  Wound has some small slough at the sides of the wound, base of the wound bed is clean and granulation buds noted.  NPWT encourages granulation tissue growth. Maintains optimal moisture needed for wound healing.     Factors affecting wound healing:  DM2, Obese, CHF  Past Medical History:   Past Medical History was reviewed with patient.   has a past medical history of ASTHMA, Atrial fibrillation (HCC), CAD (coronary artery disease), Chronic obstructive pulmonary disease (HCC), Congestive heart failure (HCC), and Diabetes.     Past Surgical History: Past Surgical History was reviewed with patient.   has a past surgical history that includes other abdominal surgery; multiple coronary artery bypass endo vein  harvest (12/22/2017); oscar (12/22/2017); thoracoscopy (Left, 1/25/2018); colonoscopy - endo (N/A, 7/25/2018); knee amputation below (Left, 11/4/2020); and knee amputation above (Left, 11/12/2020).     Goals:  Steady decrease in wound area and depth weekly     NURSING PLAN OF CARE ORDERS (X):    Dressing changes: See Dressing Care orders:   X  Skin care: See Skin Care orders:   Rectal tube care: See Rectal Tube Care orders:   Other orders:    WOUND TEAM PLAN OF CARE (X):   NPWT change 3 x week:    X    Dressing changes by wound team:       Follow up as needed:       Other (explain):    Anticipated discharge plans (X): Patient will need advanced care for wound VAC and also rehab as patient is an new AKA.   SNF:         X  Home Care:    X       Outpatient Wound Center:            Self Care:            Other:

## 2020-12-03 NOTE — DISCHARGE PLANNING
Agency/Facility Name: [facility]  Spoke To: Greta  Outcome: Per Greta, the administration left her a note stating they could not take this pt due to present staffing

## 2020-12-03 NOTE — PROGRESS NOTES
Inpatient Anticoagulation Service Note    Date: 12/3/2020    Reason for Anticoagulation: Atrial Fibrillation   Target INR: 2.0 to 3.0  [id number] VASc Score: 6  HAS-BLED Score: 1   Hemoglobin Value: (!) 9.6  Hematocrit Value: (!) 32.2  Lab Platelet Value: 191    INR from last 7 days     Date/Time INR Value    12/03/20 0431  (!) 2.81    12/02/20 0315  (!) 3.05    12/01/20 0121  (!) 2.57    11/30/20 0059  (!) 2.17    11/29/20 0234  (!) 1.95    11/28/20 0101  (!) 1.82    11/27/20 0417  (!) 1.69    11/26/20 1434  (!) 1.6        Dose from last 7 days     Date/Time Dose (mg)    12/03/20 1151  5    12/02/20 0957  0    12/01/20 1248  5    11/30/20 1607  5    11/29/20 0851  7.5    11/28/20 1018  5    11/27/20 1419  7.5    11/26/20 1524  10        Average Dose (mg): (home dose 7.5 mg Su/Tu/Th and 5 mg all other days)  Significant Interactions: Amiodarone, Antibiotics, Statin  Bridge Therapy: No  Days of Overlap Therapy: 0   INR Value Greater than 2 Prior to Discontinuation of Parenteral Anticoagulation: Not Applicable  Reversal Agent Administered: Not Applicable    Comments: INR therapeutic today with held dose yesterday. H/H and platelets stable overnight. There are no s/s of bleeding noted. Warfarin-DDIs noted above; micafungin to continue x 6 weeks through 2/10/20 per ID. Patient is followed by the [facility], per most recent docmuentation, patient takes warfarin 5 mg po daily except 7.5 mg po every Sunday/Tuesday/Thursday. Will start warfarin 5 mg po daily; recheck INR with AM labs.    Education Material Provided?: No (patient on chronic VKA therapy)  Pharmacist suggested discharge dosing: Possibly warfarin 5 mg po daily; repeat INR within 48-72 hours of discharge.      Ruth Negron, PharmD, BCOP

## 2020-12-03 NOTE — PROGRESS NOTES
LIMB PRESERVATION SERVICE     Consulted for diabetic stump infection 12/3/2020.      Patient admitted 11/24/2020 as a transfer from [facility] for surgical intervention of left thigh abscess.  Underwent left AKA on 11/12/2020 by Dr. Durbin.  S/p I&D with VAC placement to left thigh by Dr. Aguilar 11/26/2020.  Left thigh wound VAC managed by wound team.  Left AKA incision has resolved.  Right lower leg ulcers have resolved.  Seen by diabetes educator last admission in November 2020. a1c 7.8%  LPS not involved during this admission. LPS to sign off.  Please reconsult if needed.  Discussed with James CHOWDHURY

## 2020-12-03 NOTE — CARE PLAN
Problem: Safety  Goal: Will remain free from falls  Outcome: PROGRESSING AS EXPECTED     Problem: Infection  Goal: Will remain free from infection  Outcome: PROGRESSING AS EXPECTED  Note: Wound vac in place to LLE. Wound team following for dressing changes and treatment recommendations.     Problem: Pain Management  Goal: Pain level will decrease to patient's comfort goal  Outcome: PROGRESSING AS EXPECTED  Note: PRN oxy given for pain in LLE. Pt reports adequate pain control with current regimen.     Problem: Psychosocial Needs:  Goal: Level of anxiety will decrease  Outcome: PROGRESSING AS EXPECTED

## 2020-12-03 NOTE — PROGRESS NOTES
Received report from mars RN and assumed care of pt. Pt currently resting in bed. Denies any acute needs or concerns at this time. Aox4.    Call light within reach and fall precautions in place. Will monitor

## 2020-12-03 NOTE — CARE PLAN
Problem: Safety  Goal: Will remain free from injury  Outcome: PROGRESSING AS EXPECTED     Problem: Infection  Goal: Will remain free from infection  Outcome: PROGRESSING AS EXPECTED     Problem: Pain Management  Goal: Pain level will decrease to patient's comfort goal  Outcome: PROGRESSING AS EXPECTED     Patient remained free from injury/falls this shift; up max assist, call bell within reach. Pain well controlled with PRN medications. Wound vac in place. BS checked ACHS and covered appropriately. IV abx administered per orders; L PICC line, +blood return. Awaiting placement for IV abx. Will continue to monitor.

## 2020-12-03 NOTE — PROGRESS NOTES
Hospital Medicine Daily Progress Note    Date of Service  12/3/2020    Chief Complaint  54 y.o. male admitted 11/24/2020 with left AKA wound infection     Hospital Course  A 54 y.o. male with PMH of CAD, DM, COPD, CHF, and A-fib who had recent left AKA on 11/12 who left AMA from hospital, admitted on 11/24/20 as a transfer from [place], [place] for left AKA wound infection and shortness of breath secondary to CHF. ID & Ortho on board. On cefazolin 2g IV Q8H based on prior sensitivities. Ortho I & D left AKA wound on 11/26. ID added fluconazole on 11/29 for  OR cultures growing yeast    Interval Problem Update  No acute complaints this morning.  Saturating 97% on 3 L oxygen.  Patient reports he usually uses 2 to 3 L of oxygen at home.  Denies shortness of breath, denies chest pain.    Consultants/Specialty  ID   Infectious disease    Code Status  Full Code    Disposition  [facility]  Needs placement for long term IV Abx    Review of Systems  Review of Systems   Constitutional: Negative for chills, fever and malaise/fatigue.   HENT: Negative for congestion, ear discharge, ear pain, sinus pain and sore throat.    Eyes: Negative for blurred vision and double vision.   Respiratory: Negative for cough (resolved), sputum production, shortness of breath (resolved) and wheezing.    Cardiovascular: Positive for leg swelling. Negative for chest pain and palpitations.   Gastrointestinal: Negative for abdominal pain, constipation, diarrhea, nausea and vomiting.   Genitourinary: Negative for dysuria, frequency and urgency.   Musculoskeletal: Positive for joint pain. Negative for myalgias.   Neurological: Positive for weakness (diffuse). Negative for dizziness (resolved), focal weakness and headaches.   Psychiatric/Behavioral: The patient is not nervous/anxious.         Physical Exam  Temp:  [36.4 °C (97.6 °F)-37 °C (98.6 °F)] 37 °C (98.6 °F)  Pulse:  [67-80] 67  Resp:  [17-18] 18  BP: ()/(42-85) 98/42  SpO2:  [92 %-97 %]  97 %    Physical Exam  Constitutional:       General: He is not in acute distress.     Appearance: He is obese.   HENT:      Head: Normocephalic and atraumatic.      Nose: Nose normal.      Mouth/Throat:      Mouth: Mucous membranes are moist.      Pharynx: No posterior oropharyngeal erythema.   Eyes:      General: No scleral icterus.     Extraocular Movements: Extraocular movements intact.      Conjunctiva/sclera: Conjunctivae normal.      Pupils: Pupils are equal, round, and reactive to light.   Neck:      Musculoskeletal: Normal range of motion and neck supple. No neck rigidity.   Cardiovascular:      Rate and Rhythm: Normal rate. Rhythm irregular.      Pulses: Normal pulses.      Heart sounds: No murmur. No gallop.    Pulmonary:      Effort: Pulmonary effort is normal.      Breath sounds: Normal breath sounds. No stridor. No wheezing, rhonchi or rales.   Abdominal:      General: Bowel sounds are normal. There is distension.      Palpations: Abdomen is soft.      Tenderness: There is no abdominal tenderness. There is no guarding.   Musculoskeletal:         General: Swelling present. No tenderness.      Comments: Left AKA amputation  S/p I&D on left AKA wound, wound VAC in placed    RUE erythema & crusted areas   Skin:     General: Skin is warm.      Comments: Chronic venous stasis changes on her right lower extremity   Neurological:      General: No focal deficit present.      Mental Status: He is alert and oriented to person, place, and time.   Psychiatric:         Mood and Affect: Mood normal.         Behavior: Behavior normal.         Fluids    Intake/Output Summary (Last 24 hours) at 12/3/2020 1018  Last data filed at 12/3/2020 0600  Gross per 24 hour   Intake 950 ml   Output 600 ml   Net 350 ml       Laboratory  Recent Labs     12/01/20  0121 12/02/20  0315 12/03/20  0431   WBC 8.1 8.1 8.1   RBC 3.28* 3.28* 3.24*   HEMOGLOBIN 9.6* 9.6* 9.6*   HEMATOCRIT 32.4* 32.6* 32.2*   MCV 98.8* 99.4* 99.4*   MCH 29.3  29.3 29.6   MCHC 29.6* 29.4* 29.8*   RDW 70.8* 71.0* 71.2*   PLATELETCT 195 200 191   MPV 10.3 10.9 10.7     Recent Labs     12/01/20  0121 12/02/20  0315   SODIUM 135 135   POTASSIUM 4.1 4.3   CHLORIDE 98 95*   CO2 29 32   GLUCOSE 114* 110*   BUN 14 16   CREATININE 0.79 0.85   CALCIUM 9.1 9.3     Recent Labs     12/01/20  0121 12/02/20  0315 12/03/20  0431   INR 2.57* 3.05* 2.81*               Imaging  IR-PICC LINE PLACEMENT W/ GUIDANCE > AGE 5   Final Result                  Ultrasound-guided PICC placement performed by qualified nursing staff as    above.          US-EXTREMITY VENOUS UPPER UNILAT LEFT   Final Result      CT-EXTREMITY, LOWER WITH LEFT   Final Result      Fluid collection along the distal posterior aspect of the above the knee amputation measuring 7.8 x 2.3 x 5.3 cm without significant peripheral enhancement to suggest abscess.      Extensive left thigh subcutaneous edema.      CT-ABDOMEN WITH & W/O   Final Result      Resolution of cystic pancreas lesion most consistent with pancreatic pseudocyst.      Cirrhotic appearing liver with splenomegaly.      Nonobstructive right renal stone.      Possible gallstones            US-EXTREMITY ARTERY LOWER UNILAT LEFT   Final Result      US-EXTREMITY VENOUS LOWER BILAT   Final Result      DX-CHEST-LIMITED (1 VIEW)   Final Result      Moderate bilateral interstitial opacities which likely represents pulmonary edema.      Stable cardiac silhouette enlargement.           Assessment/Plan  * Soft tissue infection- (present on admission)  Assessment & Plan  s/p initial left intitial knee amputation on 11/2/20 with Dr. Durbin, followed by completion of left AKA on 11/12.  Patient left [facility] AMA on 11/17  Presented to [facility] on 11/20 with purulent drainage from wound vac insertion site, with wound vac removal at outside hospital.   - Infectious Disease and orthopedic Sx on board  - on cefazolin 2g IV Q8H based on prior sensitivities  -I&D by orthopedic Sx on 11/26,  wound VAC placed  -Pain control  - ID on board, continue Ancef x 6 weeks (end date 2/10/2020). But if the patient refuses, recommend p.o. Augmentin 875 mg twice daily for 6 weeks, along with outpatient wound care  ID added fluconazole on 11/29 for OR cultures growing yeast.  On 11/30: ID stop fluconazole & started on Micafungin for concerning new rash.   PICC placed 12/1/2020.  Needs placement for long term IV Abx    Paroxysmal atrial fibrillation ([facility])- (present on admission)  Assessment & Plan   Continue Amiodarone 200mg QAM & metoprolol   [id number]    INR 2.57 on 12/1    COPD (chronic obstructive pulmonary disease) ([facility])- (present on admission)  Assessment & Plan  - COPD exacerbation on admission  - PFTS from 2018 revealed FVC 37% predicted.  FEV1 35% predicted.  No   significant response to bronchodilators.  FEV1/FVC is 73. Total lung capacity 5.29,     Diffusion DLCO 41% predicted indicating mixed obstructive and restrictive disease  - wheezing on exam and c/o shortness of breath  -Chest x-ray showed pulmonary edema  -Oxygen per protocol  -RT protocol with inhalers  - Acapella treatment  - will avoid systemic steroids at this time due to active soft tissue infection    Pulmonary HTN ([facility])  Assessment & Plan  Estimated right ventricular systolic pressure  is 40 mmHg on echo on 11/2  - likely secondary to CHF, COPD and SHASHANK  - continue to manage COPD and CHF exacerbation    Acute exacerbation of CHF (congestive heart failure) ([facility])- (present on admission)  Assessment & Plan  PMH of CAD and NSTEMI  Patient [facility] admitted to [facility] found to have CHF exacerbation  - Last echo on 11/2  notable for LVEF 50%, Mild aortic stenosis,   Toprol 25mg QAM  Rosuvastatin 40mg daily  Lasix 40mg daily  Aldactone 25mg daily  Daily weights and I/Os  - Resolved.  Back to home dose of furosemide.      Type 2 diabetes mellitus with complication, without long-term current use of insulin ([facility])- (present on  admission)  Assessment & Plan  - A1c 7.8 on 10/30/2020  - Correctional insulin with hypoglycemia protocol  - DM diet    Hematoma of arm, right, initial encounter  Assessment & Plan  US RUE 11/27: a resolving hematoma in the right forearm  Wound care consulted.     SHASHANK (obstructive sleep apnea)- (present on admission)  Assessment & Plan  CPAP per RT    CKD (chronic kidney disease), stage III- (present on admission)  Assessment & Plan  - continue to monitor  - Creatinine at baseline at 0.69    Essential hypertension- (present on admission)  Assessment & Plan  Continue home medications with holding parameters       VTE prophylaxis: SCDs & warfarin     I have performed a physical exam, and I have reviewed and updated ROS and Plan today (12/03/20).  In review of yesterday's note, changes are noted above.

## 2020-12-04 LAB
GLUCOSE BLD-MCNC: 125 MG/DL (ref 65–99)
GLUCOSE BLD-MCNC: 135 MG/DL (ref 65–99)
GLUCOSE BLD-MCNC: 152 MG/DL (ref 65–99)
INR PPP: 2.43 (ref 0.87–1.13)
PROTHROMBIN TIME: 27.2 SEC (ref 12–14.6)

## 2020-12-04 PROCEDURE — 99232 SBSQ HOSP IP/OBS MODERATE 35: CPT | Performed by: STUDENT IN AN ORGANIZED HEALTH CARE EDUCATION/TRAINING PROGRAM

## 2020-12-04 PROCEDURE — A9270 NON-COVERED ITEM OR SERVICE: HCPCS | Performed by: STUDENT IN AN ORGANIZED HEALTH CARE EDUCATION/TRAINING PROGRAM

## 2020-12-04 PROCEDURE — 700111 HCHG RX REV CODE 636 W/ 250 OVERRIDE (IP): Performed by: INTERNAL MEDICINE

## 2020-12-04 PROCEDURE — 700102 HCHG RX REV CODE 250 W/ 637 OVERRIDE(OP): Performed by: STUDENT IN AN ORGANIZED HEALTH CARE EDUCATION/TRAINING PROGRAM

## 2020-12-04 PROCEDURE — 700111 HCHG RX REV CODE 636 W/ 250 OVERRIDE (IP): Performed by: STUDENT IN AN ORGANIZED HEALTH CARE EDUCATION/TRAINING PROGRAM

## 2020-12-04 PROCEDURE — 770009 HCHG ROOM/CARE - ONCOLOGY SEMI PRI*

## 2020-12-04 PROCEDURE — 97605 NEG PRS WND THER DME<=50SQCM: CPT

## 2020-12-04 PROCEDURE — 700101 HCHG RX REV CODE 250: Performed by: STUDENT IN AN ORGANIZED HEALTH CARE EDUCATION/TRAINING PROGRAM

## 2020-12-04 PROCEDURE — 85610 PROTHROMBIN TIME: CPT

## 2020-12-04 PROCEDURE — 94640 AIRWAY INHALATION TREATMENT: CPT

## 2020-12-04 PROCEDURE — 97535 SELF CARE MNGMENT TRAINING: CPT | Mod: CQ

## 2020-12-04 PROCEDURE — 97530 THERAPEUTIC ACTIVITIES: CPT | Mod: CQ

## 2020-12-04 PROCEDURE — 99233 SBSQ HOSP IP/OBS HIGH 50: CPT | Performed by: INTERNAL MEDICINE

## 2020-12-04 PROCEDURE — 82962 GLUCOSE BLOOD TEST: CPT | Mod: 91

## 2020-12-04 PROCEDURE — 700105 HCHG RX REV CODE 258: Performed by: INTERNAL MEDICINE

## 2020-12-04 RX ADMIN — CEFAZOLIN SODIUM 2 G: 2 INJECTION, SOLUTION INTRAVENOUS at 07:38

## 2020-12-04 RX ADMIN — QUETIAPINE FUMARATE 12.5 MG: 25 TABLET ORAL at 05:31

## 2020-12-04 RX ADMIN — BUDESONIDE AND FORMOTEROL FUMARATE DIHYDRATE 2 PUFF: 80; 4.5 AEROSOL RESPIRATORY (INHALATION) at 05:35

## 2020-12-04 RX ADMIN — DOCUSATE SODIUM 50 MG AND SENNOSIDES 8.6 MG 2 TABLET: 8.6; 5 TABLET, FILM COATED ORAL at 05:31

## 2020-12-04 RX ADMIN — MICAFUNGIN SODIUM 100 MG: 20 INJECTION, POWDER, LYOPHILIZED, FOR SOLUTION INTRAVENOUS at 13:58

## 2020-12-04 RX ADMIN — FUROSEMIDE 40 MG: 40 TABLET ORAL at 05:31

## 2020-12-04 RX ADMIN — DOCUSATE SODIUM 50 MG AND SENNOSIDES 8.6 MG 2 TABLET: 8.6; 5 TABLET, FILM COATED ORAL at 16:50

## 2020-12-04 RX ADMIN — BUDESONIDE AND FORMOTEROL FUMARATE DIHYDRATE 2 PUFF: 80; 4.5 AEROSOL RESPIRATORY (INHALATION) at 16:50

## 2020-12-04 RX ADMIN — AMIODARONE HYDROCHLORIDE 200 MG: 200 TABLET ORAL at 05:32

## 2020-12-04 RX ADMIN — OXYCODONE HYDROCHLORIDE 10 MG: 10 TABLET ORAL at 03:17

## 2020-12-04 RX ADMIN — METOPROLOL SUCCINATE 25 MG: 25 TABLET, EXTENDED RELEASE ORAL at 05:32

## 2020-12-04 RX ADMIN — MORPHINE SULFATE 1 MG: 4 INJECTION INTRAVENOUS at 13:25

## 2020-12-04 RX ADMIN — Medication: at 12:05

## 2020-12-04 RX ADMIN — OXYCODONE HYDROCHLORIDE 10 MG: 10 TABLET ORAL at 12:51

## 2020-12-04 RX ADMIN — WARFARIN SODIUM 5 MG: 5 TABLET ORAL at 16:50

## 2020-12-04 RX ADMIN — SPIRONOLACTONE 25 MG: 25 TABLET ORAL at 05:31

## 2020-12-04 RX ADMIN — CEFAZOLIN SODIUM 2 G: 2 INJECTION, SOLUTION INTRAVENOUS at 16:50

## 2020-12-04 RX ADMIN — FERROUS SULFATE TAB 325 MG (65 MG ELEMENTAL FE) 325 MG: 325 (65 FE) TAB at 05:31

## 2020-12-04 RX ADMIN — ROSUVASTATIN CALCIUM 40 MG: 20 TABLET, FILM COATED ORAL at 05:31

## 2020-12-04 RX ADMIN — IPRATROPIUM BROMIDE AND ALBUTEROL SULFATE 3 ML: .5; 3 SOLUTION RESPIRATORY (INHALATION) at 15:46

## 2020-12-04 ASSESSMENT — COGNITIVE AND FUNCTIONAL STATUS - GENERAL
MOVING FROM LYING ON BACK TO SITTING ON SIDE OF FLAT BED: UNABLE
STANDING UP FROM CHAIR USING ARMS: A LOT
SUGGESTED CMS G CODE MODIFIER MOBILITY: CL
MOVING TO AND FROM BED TO CHAIR: A LITTLE
CLIMB 3 TO 5 STEPS WITH RAILING: TOTAL
MOBILITY SCORE: 11
TURNING FROM BACK TO SIDE WHILE IN FLAT BAD: A LITTLE
WALKING IN HOSPITAL ROOM: TOTAL

## 2020-12-04 ASSESSMENT — ENCOUNTER SYMPTOMS
ABDOMINAL PAIN: 0
BLURRED VISION: 0
SORE THROAT: 0
SINUS PAIN: 0
SHORTNESS OF BREATH: 0
DOUBLE VISION: 0
FOCAL WEAKNESS: 0
CONSTIPATION: 0
HEADACHES: 0
FEVER: 0
COUGH: 0
WHEEZING: 0
NAUSEA: 0
VOMITING: 0
DIZZINESS: 0
NERVOUS/ANXIOUS: 0
SPUTUM PRODUCTION: 0
WEAKNESS: 1
PALPITATIONS: 0
DIARRHEA: 0
MYALGIAS: 1
MYALGIAS: 0
CHILLS: 0

## 2020-12-04 ASSESSMENT — PAIN DESCRIPTION - PAIN TYPE
TYPE: ACUTE PAIN
TYPE: ACUTE PAIN;SURGICAL PAIN
TYPE: ACUTE PAIN

## 2020-12-04 ASSESSMENT — GAIT ASSESSMENTS: GAIT LEVEL OF ASSIST: UNABLE TO PARTICIPATE

## 2020-12-04 NOTE — PROGRESS NOTES
Inpatient Anticoagulation Service Note    Date: 12/4/2020    Reason for Anticoagulation: Atrial Fibrillation   Target INR: 2.0 to 3.0  [id number] VASc Score: 6  HAS-BLED Score: 1   Hemoglobin Value: (!) 9.6  Hematocrit Value: (!) 32.2  Lab Platelet Value: 191    INR from last 7 days     Date/Time INR Value    12/04/20 0320  (!) 2.43    12/03/20 0431  (!) 2.81    12/02/20 0315  (!) 3.05    12/01/20 0121  (!) 2.57    11/30/20 0059  (!) 2.17    11/29/20 0234  (!) 1.95    11/28/20 0101  (!) 1.82        Dose from last 7 days     Date/Time Dose (mg)    12/04/20 1029  5    12/03/20 1151  5    12/02/20 0957  0    12/01/20 1248  5    11/30/20 1607  5    11/29/20 0851  7.5    11/28/20 1018  5    11/27/20 1419  7.5        Average Dose (mg): (home dose 7.5 mg Field/Tu/Th and 5 mg all other days)  Significant Interactions: Amiodarone, Antibiotics, Statin  Bridge Therapy: No  Days of Overlap Therapy: 0  INR Value Greater than 2 Prior to Discontinuation of Parenteral Anticoagulation: Not Applicable   Reversal Agent Administered: Not Applicable    A/P: INR remains therapeutic today, however trending down (likely from held dose on 12/2). No new CBC collected, there are no overt s/s of bleeding noted. No change to warfarin-DDI profile at this time; micafungin to continue x 6 weeks through 2/10/20 per ID. Will continue with warfarin 5 mg po daily; recheck INR with AM labs.    Education Material Provided?: No  Pharmacist suggested discharge dosing: Possibly warfarin 5 mg po daily; repeat INR within 48-72 hours of discharge.      Ruth Negron, PharmD, BCOP

## 2020-12-04 NOTE — PROGRESS NOTES
Infectious Disease Progress Note    Author: Carol Barber M.D. Date & Time of service: 2020  9:39 AM    Chief Complaint:  AKA stump fluid collection     Interval History:   afebrile, white count 9.1, taken to the OR yesterday and underwent I&D, gross appearance of liquefied hematoma was noted, cultures obtained.   afebrile, no CBC today.  Multiple OR cultures growing yeast thus far.    AF, O2 3 L NC, stable, pruritus and some generalized malaise.  He is continued on cefazolin 2 g every 8 hours and will stop fluconazole given new onset rash and as per notes he has tolerated cephalosporins in the past.  Will transition fluconazole to micafungin and monitor.    AF, O2 3 L NC, no specific complaints other than ongoing pruritus.  No significant pain at left AKA site.  Patient continued on cefazolin and micafungin.    AF, O2 3 L NC,stable, tenderness at wound VAC site.  The wound VAC removal was extremely painful for him.  Wound still with yellow slough.  No significant drainage or tracking.  Patient continued on micafungin and cefazolin.  Rash continues to improve.     Review of Systems:  Review of Systems   Constitutional: Negative for chills, fever and malaise/fatigue.   Respiratory: Negative for cough and shortness of breath.    Gastrointestinal: Negative for abdominal pain, constipation, diarrhea, nausea and vomiting.   Musculoskeletal: Positive for myalgias.       Hemodynamics:  Temp (24hrs), Av.8 °C (98.3 °F), Min:36.7 °C (98.1 °F), Max:37 °C (98.6 °F)  Temperature: 37 °C (98.6 °F)  Pulse  Av.8  Min: 60  Max: 98   Blood Pressure: 113/72       Physical Exam:  Physical Exam  Constitutional:       Appearance: Normal appearance. He is obese.   Cardiovascular:      Rate and Rhythm: Normal rate and regular rhythm.      Heart sounds: Normal heart sounds.   Pulmonary:      Effort: Pulmonary effort is normal.      Comments: Diminished breath sounds bilaterally  Abdominal:       General: Abdomen is flat. Bowel sounds are normal.      Palpations: Abdomen is soft.   Musculoskeletal:         General: Tenderness and signs of injury present.      Right lower leg: Edema present.      Comments: Left leg wound examined during wound VAC exchange, pink tissue, minimal slough, some surrounding erythema, tenderness with foam removal.  Right leg with edema and erythema.   Skin:     Findings: Rash present.      Comments: Maculopapular rash on back, mild rash on forearms.  Right leg with chronic skin changes related to venous stasis.   Neurological:      General: No focal deficit present.      Mental Status: He is alert and oriented to person, place, and time.   Psychiatric:         Mood and Affect: Mood normal.         Behavior: Behavior normal.         Meds:    Current Facility-Administered Medications:   •  warfarin  •  insulin regular **AND** POC Blood Glucose **AND** NOTIFY MD and PharmD **AND** glucose **AND** dextrose 50%  •  micafungin  •  ipratropium-albuterol  •  morphine injection  •  mineral oil-pet hydrophilic  •  budesonide-formoterol  •  ALPRAZolam  •  QUEtiapine  •  MD Alert...Warfarin per Pharmacy  •  acetaminophen  •  oxyCODONE immediate-release **OR** oxyCODONE immediate-release  •  pneumococcal 13-Paradise Conj Vacc  •  influenza vaccine quad  •  ceFAZolin  •  amiodarone  •  ferrous sulfate  •  furosemide  •  metoprolol SR  •  rosuvastatin  •  spironolactone  •  senna-docusate **AND** polyethylene glycol/lytes **AND** magnesium hydroxide **AND** bisacodyl  •  Respiratory Therapy Consult    Labs:  Recent Labs     12/02/20 0315 12/03/20  0431   WBC 8.1 8.1   RBC 3.28* 3.24*   HEMOGLOBIN 9.6* 9.6*   HEMATOCRIT 32.6* 32.2*   MCV 99.4* 99.4*   MCH 29.3 29.6   RDW 71.0* 71.2*   PLATELETCT 200 191   MPV 10.9 10.7   NEUTSPOLYS 75.60* 77.90*   LYMPHOCYTES 10.00* 8.30*   MONOCYTES 9.40 7.20   EOSINOPHILS 3.60 4.30   BASOPHILS 0.50 0.60   RBCMORPHOLO Present  --      Recent Labs     12/02/20 0315    SODIUM 135   POTASSIUM 4.3   CHLORIDE 95*   CO2 32   GLUCOSE 110*   BUN 16     Recent Labs     12/02/20  0315   CREATININE 0.85       Imaging:  Ct-abdomen With & W/o    Result Date: 11/25/2020 11/25/2020 9:55 PM HISTORY/REASON FOR EXAM:  History of pancreas lesion. TECHNIQUE/EXAM DESCRIPTION:  CT pancreas and abdomen without and with contrast. Initial precontrast thick-section images were obtained through the pancreas.  Following this, nonionic contrast was administered by IV, and thin-section helical scanning performed from the diaphragmatic domes through the iliac crests.  Pancreatic parenchymal phase and portal venous phase (dual-phase) images were obtained following contrast administration. 100 mL of Omnipaque 350 nonionic contrast was administered. Low dose optimization technique was utilized for this CT exam including automated exposure control and adjustment of the mA and/or kV according to patient size. COMPARISON: September 12, 2019 FINDINGS: There is bibasilar atelectasis with apparent trace right pleural fluid. There are old right rib fractures. There are coronary artery calcifications. The heart is enlarged. Initial noncontrast images reveal possible subtle gallstones dependently in the gallbladder. No pancreas calcifications identified. There is calcified plaque in the aorta and branch vessels. There is a nonobstructive right renal stone measuring 4 mm. With the administration of contrast the pancreas enhances homogeneously. The previously noted cystic structure has resolved most consistent with a pancreas pseudocyst. The pancreas is somewhat atrophic. The liver is heterogeneous with a nodular surface suggestive of cirrhosis. The spleen is enlarged measuring 19.6 cm anterior posteriorly. The main portal vein, splenic vein and superior mesenteric vein appear patent. There is a stable left adrenal gland nodule. The right adrenal gland is unremarkable. The kidneys enhance symmetrically. No  hydronephrosis. No ascites or lymphadenopathy. The visualized bowel is nondilated. There is partial visualization of a ventral hernia containing fat.     Resolution of cystic pancreas lesion most consistent with pancreatic pseudocyst. Cirrhotic appearing liver with splenomegaly. Nonobstructive right renal stone. Possible gallstones     Ct-extremity, Lower With Left    Result Date: 11/25/2020 11/25/2020 9:54 PM HISTORY/REASON FOR EXAM:  Persistent wound drainage; Persistent wound drainage from left AKA, evaluate for deep fluid collection. TECHNIQUE/EXAM DESCRIPTION AND NUMBER OF VIEWS:  CT scan of the LEFT lower extremity with contrast, with reconstructions. Thin helical 3 mm sections were obtained from the distal femur through the proximal tibia/fibula. Sagittal and coronal multiplanar reconstructions were generated from the axial images. A total of 100 mL of Omnipaque 350 nonionic contrast was administered  IV without complication. Up to date radiation dose reduction adjustments have been utilized to meet ALARA standards for radiation dose reduction. COMPARISON: None. FINDINGS: There is left thigh cutaneous edema. There is a fluid collection along the posterior aspect of the above the knee amputation. This measures approximately 7.8 x 2.3 x 5.3 cm. No obvious peripheral enhancement identified to suggest infection. No soft tissue gas identified. There are varicose veins identified.     Fluid collection along the distal posterior aspect of the above the knee amputation measuring 7.8 x 2.3 x 5.3 cm without significant peripheral enhancement to suggest abscess. Extensive left thigh subcutaneous edema.    Dx-chest-limited (1 View)    Result Date: 11/25/2020 11/25/2020 6:02 AM HISTORY/REASON FOR EXAM:  Shortness of Breath TECHNIQUE/EXAM DESCRIPTION AND NUMBER OF VIEWS: Single portable view of the chest. COMPARISON: 11/1/2020 FINDINGS: Cardiomediastinal silhouette is stable. Aortic calcified atherosclerotic plaque.  Sternotomy wires. There are diffuse interstitial opacities probably representing pulmonary edema. Infection should be excluded clinically. No significant pleural effusion or pneumothorax. No acute osseous abnormality.     Moderate bilateral interstitial opacities which likely represents pulmonary edema. Stable cardiac silhouette enlargement.    Us-extremity Artery Lower Unilat Left    Result Date: 2020  Lower Extremity  Arterial Duplex Report  Vascular Laboratory  CONCLUSIONS  Prior study 10/30/20  Triphasic waveforms seen at the common femoral, profunda, and proximal-mid  femoral artery.  JARRETT WHITE  Exam Date:     2020 12:54  Room #:     Inpatient  Priority:     Routine  Ht (in):             Wt (lb):  Ordering Physician:        SYMONE NUÑEZ  Referring Physician:       [id number] Rooney  Sonographer:               Sonam Mccarty RVT, RDMS  Study Type:                Complete Unilateral  Technical Quality:         Poor  Age:    54    Gender:     M  MRN:    [id number]  :    1965      BSA:  Indications:     Atherosclerosis of peripheral arteries  CPT Codes:       01934  ICD Codes:       440.20  History:         Left above the knee amputation. PVD. Prior study 10/30/20  Limitations:     Technically difficult study due to patient body habitus.                RIGHT  Waveform        Peak Systolic Velocity (cm/s)                  Prox    Prox-Mid  Mid    Mid-Dist  Distal                                                             CFA                                                             PFA                                                             SFA                                                             POP                                                             AT                                                             PT                                                             [place]                LEFT   Waveform        Peak Systolic Velocity (cm/s)                  Prox    Prox-Mid  Mid    Mid-Dist  Distal  Triphasic                         58                       CFA  Triphasic       49                                         PFA  Triphasic       60                54                       SFA                                                             POP                                                             AT                                                             PT                                                             [place]  FINDINGS  Left.  Triphasic waveforms seen at the common femoral, profunda, and proximal-mid  femoral artery.  Distal femoral artery not visualized.  Xiang Mendieta MD  (Electronically Signed)  Final Date:      2020                   15:25    Us-extremity Venous Lower Bilat    Result Date: 2020   Vascular Laboratory  CONCLUSIONS  No prior study is available for comparison.  Limited study, no obvious DVT.  JARRETT WHITE  Exam Date:     2020 12:43  Room #:     Inpatient  Priority:     Routine  Ht (in):             Wt (lb):  Ordering Physician:        SYMONE NUÑEZ  Referring Physician:       [id number] Rooney  Sonographer:               Sonam Mccarty RVT, RDMS  Study Type:                Limited Bilateral  Technical Quality:         Poor  Age:    54    Gender:     M  MRN:    [id number]  :    1965      BSA:  Indications:     Edema  CPT Codes:       08196  ICD Codes:       782.3  History:         Edema  Limitations:     Technically difficult study due to patient body habitus.  PROCEDURES:  Bilateral lower extremity venous duplex imaging.  The following venous structures were evaluated: common femoral, profunda  femoral, prroximal greater saphenous, femoral, popliteal , peroneal and  posterior tibial veins.  *Left above the knee amputation.  FINDINGS:  Right lower extremity -   Patient refused compressions at the mid femoral, distal femoral, and  popliteal vein. Complete color filling visualized.  Calf veins not visualized.  All other veins of the right lower extremity demonstrate normal flow  dynamics with no evidence of deep vein thrombosis.  Left lower extremity -  Left above the knee amputation.  Patient refused compressions at the common femoral and distal femoral vein.  Complete color filling visualized.  All other veins of the left lower extremity demonstrate normal flow  dynamics with no evidence of deep vein thrombosis.  Xiang Mendieta MD  (Electronically Signed)  Final Date:      2020                   14:00    Us-extremity Venous Upper Unilat Left    Result Date: 2020   Upper Extremity  Venous Duplex Report  Vascular Laboratory  CONCLUSIONS  No prior exam is available for comparison.  LEFT ARM  Subacute, partially occlusive superficial thrombus in the median cubital  vein.  No evidence of DVT.  JARRETT WHITE  Exam Date:     2020 05:37  Room #:     Inpatient  Priority:     Routine  Ht (in):             Wt (lb):  Ordering Physician:        WENDY PEARL  Referring Physician:       [id number] Cheng  Sonographer:               Feli Nice                             T, [facility]  Study Type:                Complete Unilateral  Technical Quality:         Adequate  Age:    54    Gender:     M  MRN:    [id number]  :    1965      BSA:  Indications:     Swelling of Limb, Pain in Limb  CPT Codes:       20687  ICD Codes:       729.81  729.5  History:         Left distal bicep pain, swelling, and erythema x 1 day  Limitations:  PROCEDURES:  Left upper extremity venous duplex imaging.  The following venous structures were evaluated: internal jugular,  subclavian, axillary, brachial, cephalic and basilic veins.  Serial compression, augmentation maneuvers,  color and spectral Doppler  flow evaluations were performed.  FINDINGS:  Left upper extremity.   Subacute, partially occlusive superficial thrombus in the median cubital  vein.  All other veins demonstrate complete color filling and compressibility with  normal venous flow dynamics including spontaneous flow, response to  augmentation maneuvers, and respiratory phasicity.  No deep venous thrombosis.  Flow was evaluated in the contralateral subclavian vein and normal venous  flow dynamics including spontaneous flow, respiratory phasic variation and  augmentation were demonstrated.  Jerry Ellison MD  (Electronically Signed)  Final Date:      28 November 2020                   07:45    Ir-picc Line Placement W/ Guidance > Age 5    Result Date: 12/1/2020  HISTORY/REASON FOR EXAM:   PICC placement. TECHNIQUE/EXAM DESCRIPTION AND NUMBER OF VIEWS:   PICC line insertion with ultrasound guidance.  The procedure was performed using maximal sterile barrier technique including sterile gown, mask, cap, and donning of sterile gloves following appropriate hand hygiene and/or sterile scrub. Patient skin site was prepped with 2% Chlorhexidine solution. FINDINGS:  PICC line insertion with Ultrasound Guidance was performed by qualified nursing staff without the assistance of a Radiologist. PICC positioning appropriateness confirmed by 3CG technology; chest xray only needed in the instance 3CG unable to confirm placement.              Ultrasound-guided PICC placement performed by qualified nursing staff as above.       Micro:  Results     Procedure Component Value Units Date/Time    CULTURE WOUND W/ GRAM STAIN [[id number]]  (Abnormal) Collected: 11/26/20 1007    Order Status: Completed Specimen: Wound Updated: 12/02/20 0749     Significant Indicator POS     Source WND     Site Left Stump wound #1     Culture Result Growth noted after further incubation, see below for  organism identification.       Gram Stain Result Rare WBCs.  No organisms seen.       Culture Result Candida parapsilosis  Rare growth      Narrative:       "Surgery - swabs received    Anaerobic Culture [[id number]] Collected: 11/26/20 1007    Order Status: Completed Specimen: Wound Updated: 12/02/20 0749     Significant Indicator NEG     Source WND     Site Left Stump wound #1     Culture Result No Anaerobes isolated.    Narrative:      Surgery - swabs received    Anaerobic Culture [[id number]] Collected: 11/26/20 1007    Order Status: Completed Specimen: Tissue Updated: 12/01/20 0903     Significant Indicator NEG     Source TISS     Site Left Stump wound#2     Culture Result No Anaerobes isolated.    Narrative:      Surgery Specimen    CULTURE TISSUE W/ GRM STAIN [[id number]]  (Abnormal) Collected: 11/26/20 1007    Order Status: Completed Specimen: Tissue Updated: 12/01/20 0903     Significant Indicator POS     Source TISS     Site Left Stump wound#2     Culture Result Growth noted after further incubation, see below for  organism identification.       Gram Stain Result Rare epithelial cells.  No organisms seen.       Culture Result Candida parapsilosis  Rare growth      Narrative:      Surgery Specimen    BLOOD CULTURE [[id number]] Collected: 11/25/20 1431    Order Status: Completed Specimen: Blood from Peripheral Updated: 11/30/20 1700     Significant Indicator NEG     Source BLD     Site PERIPHERAL     Culture Result No growth after 5 days of incubation.    Narrative:      Per Hospital Policy: Only change Specimen Src: to \"Line\" if  specified by physician order.  Right Hand    BLOOD CULTURE [[id number]] Collected: 11/25/20 1013    Order Status: Completed Specimen: Blood from Peripheral Updated: 11/30/20 1300     Significant Indicator NEG     Source BLD     Site PERIPHERAL     Culture Result No growth after 5 days of incubation.    Narrative:      Per Hospital Policy: Only change Specimen Src: to \"Line\" if  specified by physician order.  Right Hand          Assessment:  Active Hospital Problems    Diagnosis   • *Soft tissue infection [L08.9]   • Paroxysmal atrial " fibrillation (HCC) [I48.0]   • COPD (chronic obstructive pulmonary disease) ([facility]) [J44.9]   • Pulmonary HTN ([facility]) [I27.20]   • Acute exacerbation of CHF (congestive heart failure) ([facility]) [I50.9]   • Type 2 diabetes mellitus with complication, without long-term current use of insulin (HCC) [E11.8]   • CKD (chronic kidney disease), stage III [N18.30]   • SHASHANK (obstructive sleep apnea) [G47.33]   • Essential hypertension [I10]   • Hematoma of arm, right, initial encounter [S40.021A]     Interval 24 hours:      AF, O2 3 L NC, stable   Labs reviewed  Micro reviewed    Patient still having a great deal of pain with wound VAC changes.  Otherwise the leg is doing well.  The wound is improved today with minimal slough and pink tissue.  Some surrounding erythema but appears related to the tape.  Patient continued on cefazolin and micafungin.    Assessment/Hospital Course:  54-year-old male with known atrial fibrillation, methamphetamine abuse, CAD, PVD, transferred from a hospital in [place] on 11/24/2020 following an admission for CHF exacerbation.  Earlier this month, patient was admitted here with group A strep bacteremia and bilateral lower extremity necrotizing cellulitis with cultures growing group A strep, MSSA.  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  During his admission at the [facility], reportedly some purulent drainage was noted from his poorly functioning wound VAC, thus he was transferred here.  CT scan showed a fluid collection along the distal posterior aspect of the AKA measuring 7.8 x 2.3 x 5.3 cm, this was not peripherally enhancing.  Per verbal report, purulent drainage was also noted at the amputation site here, prior to I&D. Patient was taken to the OR on 11/26 for I&D, gross appearance of liquefied hematoma was noted.  Upper extremity ultrasound on 11/28 with subacute partially occlusive superficial thrombus in the median cubital vein.     Left AKA  stump infection  Rash-presume this is due to fluconazole as it started after this medication was initiated.  Ongoing but improved since fluconazole stopped  Antibiotic allergies-penicillin allergy reported but per chart has tolerated cephalosporins     Plan:  -Reports of purulence at amputation site prior to I&D both here by my colleague as well as per reports at outside hospital acknowledged. Continue IV Ancef 2g q8h and micafungin 100 mg daily for 6-week antibiotic course to end on 2/10/2020 given the proximity to the femur and concern for osteomyelitis-  rash presumed due to fluconazole so will utilize micafungin.  this will cover previously grown organisms given likely infected hematoma.   Wound has improved in appearance. Prior cultures from the site only grew yeast but were taken after he had been given vancomycin Zosyn and cefazolin which would limit yield. Also to treat the candida.   -Requested sensitivities for the Candida - in process    rash with fluconazole so will likely give 6-week course of micafungin and then monitor.  -Given poor compliance and methamphetamine abuse, best course of action would be placement at a facility with continued wound care and IV antibiotics.   -Okay for discharge to skilled facility from ID perspective  -Follow-up in ID clinic at the end of course    Discussed with wound care team.  ID will sign off

## 2020-12-04 NOTE — WOUND TEAM
"[facility]  Inpatient Services  Wound and Skin Care Progress Note    Admission Date:  11/24/2020   HPI, PMH, SH: Reviewed  Unit where seen by Wound Team: [id number]    WOUND CONSULT RELATED TO:  Scheduled Negative Pressure Wound Therapy (NPWT) dressing change.      SUBJECTIVE: \"It just hurts so bad. The tape is bothering my skin.\"     Self Report / Pain Level:  10/10. Premedicated with IV and PO by primary RN    OBJECTIVE:  Previous dressing intact. Patient on pressure redistribution mattress with waffle overlay. Moves self.     WOUND TYPE, LOCATION, CHARACTERISTICS (Pressure ulcers: location, stage, POA or date identified)     Negative Pressure Wound Therapy 11/26/20 Surgical Thigh (Active)   NPWT Pump Mode / Pressure Setting Continuous;125 mmHg    Dressing Type Small;Black Foam (Regular)    Number of Foam Pieces Used 2    Canister Changed No    NEXT Dressing Change/Treatment Date 12/07/20            Wound 10/30/20 Full Thickness Wound Leg;Foot Circumferential Right POA (Active)       Wound 11/26/20 Full Thickness Wound Thigh Lateral Left open surgical site (Active)   Wound Image      Site Assessment Pink;Yellow    Periwound Assessment Pink    Margins Defined edges    Closure Secondary intention    Drainage Amount Scant    Drainage Description Serosanguineous    Treatments Cleansed;Site care    Wound Cleansing Approved Wound Cleanser    Periwound Protectant Skin Protectant Wipes to Periwound;Drape    Dressing Cleansing/Solutions Not Applicable    Dressing Options Wound Vac    Dressing Changed Changed    Dressing Status Clean;Dry;Intact    Dressing Change/Treatment Frequency Monday, Wednesday, Friday, and As Needed    NEXT Dressing Change/Treatment Date 12/07/20    NEXT Weekly Photo (Inpatient Only) 12/11/20    Non-staged Wound Description Full thickness    Wound Length (cm) 5.6 cm    Wound Width (cm) 1.3 cm    Wound Depth (cm) 2 cm    Wound Surface Area (cm^2) 7.28 cm^2    Wound Volume (cm^3) 14.56 " cm^3    Wound Healing % 44    Shape irregular    Wound Odor None    Exposed Structures None                    Vascular: N/A    Lab Values     Results for JARRETT WHITE (MRN [id number]) as of 11/28/2020 11:30   Ref. Range 11/27/2020 04:17 11/28/2020 01:01   WBC Latest Ref Range: 4.8 - 10.8 K/uL 9.1    RBC Latest Ref Range: 4.70 - 6.10 M/uL 3.15 (L)    Hemoglobin Latest Ref Range: 14.0 - 18.0 g/dL 9.2 (L) 9.4 (L)   Hematocrit Latest Ref Range: 42.0 - 52.0 % 31.7 (L) 32.3 (L)     AIC:      Lab Results   Component Value Date/Time    HBA1C 7.8 (H) 10/30/2020 06:53 AM         Culture:   Completed 11/26/20    INTERVENTIONS BY WOUND TEAM: Patient seen with Dr Barber (ID MD).  Soaked foam with NS prior to removal. Used adhesive remover to remove drape from skin. Soaked foam again with NS. Foam removed. No retained foam noted.  Cleaned wound with NS. Applied No Sting and and dermatac drape to periwound skin. One strip of foam into wound bed and tract, the one piece for TRAC pad. Sealed with drape. Resumed VAC at 125 mmHg continuous, no leaks noted.     Interdisciplinary consultation: Patient, Bedside RN, Dr. Barber (ID)     EVALUATION:  Wound has some small slough but overall appears to be red and mostly clean. Periwound red. Pt reports that his skin is sensitive. Area around picc line also noted to be red. Dermatac used for periwound to hopefully help with his skin. Continued NPWT dressing to promote tissue granulation and drainage control.     Factors affecting wound healing:  DM2, Obese, CHF  Past Medical History:   Past Medical History was reviewed with patient.   has a past medical history of ASTHMA, Atrial fibrillation (HCC), CAD (coronary artery disease), Chronic obstructive pulmonary disease (HCC), Congestive heart failure (HCC), and Diabetes.     Past Surgical History: Past Surgical History was reviewed with patient.   has a past surgical history that includes other abdominal surgery; multiple coronary artery  bypass endo vein harvest (12/22/2017); oscar (12/22/2017); thoracoscopy (Left, 1/25/2018); colonoscopy - endo (N/A, 7/25/2018); knee amputation below (Left, 11/4/2020); and knee amputation above (Left, 11/12/2020).     Goals:  Steady decrease in wound area and depth weekly     NURSING PLAN OF CARE ORDERS (X):    Dressing changes: See Dressing Care orders:   X  Skin care: See Skin Care orders:   Rectal tube care: See Rectal Tube Care orders:   Other orders:    WOUND TEAM PLAN OF CARE (X):   NPWT change 3 x week:    X    Dressing changes by wound team:       Follow up as needed:       Other (explain):    Anticipated discharge plans (X): Patient will need advanced care for wound VAC and also rehab as patient is an new AKA.   SNF:         X  Home Care:    X       Outpatient Wound Center:            Self Care:            Other:

## 2020-12-04 NOTE — DISCHARGE PLANNING
Anticipated Discharge Disposition: [facility]     Action: RN CM received call from [facility] who states they can accept this patient, but likely won't have beds open until next week. MIN TANNER updated Dr Martino.     Will follow up w [facility] to obtain update on bed availabilty     Per Dr Martino, patient can transfer to [facility] as soon as he has a bed     Barriers to Discharge: [facility] bed     Plan: Follow and assist. Follow up with [facility] regarding bed availability

## 2020-12-04 NOTE — PROGRESS NOTES
Hospital Medicine Daily Progress Note    Date of Service  12/4/2020    Chief Complaint  54 y.o. male admitted 11/24/2020 with left AKA wound infection     Hospital Course  A 54 y.o. male with PMH of CAD, DM, COPD, CHF, and A-fib who had recent left AKA on 11/12 who left AMA from hospital, admitted on 11/24/20 as a transfer from [place], [place] for left AKA wound infection and shortness of breath secondary to CHF. ID & Ortho on board. On cefazolin 2g IV Q8H based on prior sensitivities. Ortho I & D left AKA wound on 11/26. ID added fluconazole on 11/29 for  OR cultures growing yeast    Interval Problem Update  No acute complaints this morning.  Saturating 95% on 3 L oxygen, which is his baseline.  Denies shortness of breath, chest pain.  Remains on IV cefazolin.    Consultants/Specialty  ID   Infectious disease    Code Status  Full Code    Disposition  [facility]  Needs placement for long term IV Abx    Review of Systems  Review of Systems   Constitutional: Negative for chills, fever and malaise/fatigue.   HENT: Negative for congestion, ear discharge, ear pain, sinus pain and sore throat.    Eyes: Negative for blurred vision and double vision.   Respiratory: Negative for cough (resolved), sputum production, shortness of breath (resolved) and wheezing.    Cardiovascular: Positive for leg swelling. Negative for chest pain and palpitations.   Gastrointestinal: Negative for abdominal pain, constipation, diarrhea, nausea and vomiting.   Genitourinary: Negative for dysuria, frequency and urgency.   Musculoskeletal: Positive for joint pain. Negative for myalgias.   Neurological: Positive for weakness (diffuse). Negative for dizziness (resolved), focal weakness and headaches.   Psychiatric/Behavioral: The patient is not nervous/anxious.         Physical Exam  Temp:  [36.7 °C (98.1 °F)-37 °C (98.6 °F)] 37 °C (98.6 °F)  Pulse:  [60-71] 67  Resp:  [17-18] 18  BP: (105-120)/(68-74) 113/72  SpO2:  [95 %-96 %] 95 %    Physical  Exam  Constitutional:       General: He is not in acute distress.     Appearance: He is obese.   HENT:      Head: Normocephalic and atraumatic.      Nose: Nose normal.      Mouth/Throat:      Mouth: Mucous membranes are moist.      Pharynx: No posterior oropharyngeal erythema.   Eyes:      General: No scleral icterus.     Extraocular Movements: Extraocular movements intact.      Conjunctiva/sclera: Conjunctivae normal.      Pupils: Pupils are equal, round, and reactive to light.   Neck:      Musculoskeletal: Normal range of motion and neck supple. No neck rigidity.   Cardiovascular:      Rate and Rhythm: Normal rate. Rhythm irregular.      Pulses: Normal pulses.      Heart sounds: No murmur. No gallop.    Pulmonary:      Effort: Pulmonary effort is normal.      Breath sounds: Normal breath sounds. No stridor. No wheezing, rhonchi or rales.   Abdominal:      General: Bowel sounds are normal. There is distension.      Palpations: Abdomen is soft.      Tenderness: There is no abdominal tenderness. There is no guarding.   Musculoskeletal:         General: Swelling present. No tenderness.      Comments: Left AKA amputation  S/p I&D on left AKA wound, wound VAC in placed  RUE erythema & crusted areas   Skin:     General: Skin is warm.      Comments: Chronic venous stasis changes on her right lower extremity   Neurological:      General: No focal deficit present.      Mental Status: He is alert and oriented to person, place, and time.   Psychiatric:         Mood and Affect: Mood normal.         Behavior: Behavior normal.         Fluids    Intake/Output Summary (Last 24 hours) at 12/4/2020 0956  Last data filed at 12/3/2020 2315  Gross per 24 hour   Intake 240 ml   Output 450 ml   Net -210 ml       Laboratory  Recent Labs     12/02/20  0315 12/03/20  0431   WBC 8.1 8.1   RBC 3.28* 3.24*   HEMOGLOBIN 9.6* 9.6*   HEMATOCRIT 32.6* 32.2*   MCV 99.4* 99.4*   MCH 29.3 29.6   MCHC 29.4* 29.8*   RDW 71.0* 71.2*   PLATELETCT 200 191    MPV 10.9 10.7     Recent Labs     12/02/20  0315   SODIUM 135   POTASSIUM 4.3   CHLORIDE 95*   CO2 32   GLUCOSE 110*   BUN 16   CREATININE 0.85   CALCIUM 9.3     Recent Labs     12/02/20  0315 12/03/20  0431 12/04/20  0320   INR 3.05* 2.81* 2.43*               Imaging  IR-PICC LINE PLACEMENT W/ GUIDANCE > AGE 5   Final Result                  Ultrasound-guided PICC placement performed by qualified nursing staff as    above.          US-EXTREMITY VENOUS UPPER UNILAT LEFT   Final Result      CT-EXTREMITY, LOWER WITH LEFT   Final Result      Fluid collection along the distal posterior aspect of the above the knee amputation measuring 7.8 x 2.3 x 5.3 cm without significant peripheral enhancement to suggest abscess.      Extensive left thigh subcutaneous edema.      CT-ABDOMEN WITH & W/O   Final Result      Resolution of cystic pancreas lesion most consistent with pancreatic pseudocyst.      Cirrhotic appearing liver with splenomegaly.      Nonobstructive right renal stone.      Possible gallstones            US-EXTREMITY ARTERY LOWER UNILAT LEFT   Final Result      US-EXTREMITY VENOUS LOWER BILAT   Final Result      DX-CHEST-LIMITED (1 VIEW)   Final Result      Moderate bilateral interstitial opacities which likely represents pulmonary edema.      Stable cardiac silhouette enlargement.           Assessment/Plan  * Soft tissue infection- (present on admission)  Assessment & Plan  s/p initial left intitial knee amputation on 11/2/20 with Dr. Durbin, followed by completion of left AKA on 11/12.  Patient left [facility] AMA on 11/17  Presented to [facility] on 11/20 with purulent drainage from wound vac insertion site, with wound vac removal at outside hospital.   - Infectious Disease and orthopedic Sx on board  - on cefazolin 2g IV Q8H based on prior sensitivities  -I&D by orthopedic Sx on 11/26, wound VAC placed  -Pain control  - ID on board, continue Ancef x 6 weeks (end date 2/10/2020). But if the patient refuses, recommend  p.o. Augmentin 875 mg twice daily for 6 weeks, along with outpatient wound care  ID added fluconazole on 11/29 for OR cultures growing yeast.  On 11/30: ID stop fluconazole & started on Micafungin for concerning new rash.   PICC placed 12/1/2020.  Needs placement for long term IV Abx    Paroxysmal atrial fibrillation ([facility])- (present on admission)  Assessment & Plan   Continue Amiodarone 200mg QAM & metoprolol   [id number]    INR 2.57 on 12/1    COPD (chronic obstructive pulmonary disease) ([facility])- (present on admission)  Assessment & Plan  - COPD exacerbation on admission  - PFTS from 2018 revealed FVC 37% predicted.  FEV1 35% predicted.  No   significant response to bronchodilators.  FEV1/FVC is 73. Total lung capacity 5.29,     Diffusion DLCO 41% predicted indicating mixed obstructive and restrictive disease  - wheezing on exam and c/o shortness of breath  -Chest x-ray showed pulmonary edema  -Oxygen per protocol  -RT protocol with inhalers  - Acapella treatment  - will avoid systemic steroids at this time due to active soft tissue infection    Pulmonary HTN ([facility])  Assessment & Plan  Estimated right ventricular systolic pressure  is 40 mmHg on echo on 11/2  - likely secondary to CHF, COPD and SHASHANK  - continue to manage COPD and CHF exacerbation    Acute exacerbation of CHF (congestive heart failure) ([facility])- (present on admission)  Assessment & Plan  PMH of CAD and NSTEMI  Patient recently admitted to [facility] found to have CHF exacerbation  - Last echo on 11/2  notable for LVEF 50%, Mild aortic stenosis,   Toprol 25mg QAM  Rosuvastatin 40mg daily  Lasix 40mg daily  Aldactone 25mg daily  Daily weights and I/Os  - Resolved.  Back to home dose of furosemide.      Type 2 diabetes mellitus with complication, without long-term current use of insulin ([facility])- (present on admission)  Assessment & Plan  - A1c 7.8 on 10/30/2020  - Correctional insulin with hypoglycemia protocol  - DM diet    Hematoma of arm, right,  initial encounter  Assessment & Plan  US RUE 11/27: a resolving hematoma in the right forearm  Wound care consulted.     SHASHANK (obstructive sleep apnea)- (present on admission)  Assessment & Plan  CPAP per RT    CKD (chronic kidney disease), stage III- (present on admission)  Assessment & Plan  - continue to monitor  - Creatinine at baseline at 0.69    Essential hypertension- (present on admission)  Assessment & Plan  Continue home medications with holding parameters       VTE prophylaxis: SCDs & warfarin     I have performed a physical exam, and I have reviewed and updated ROS and Plan today (12/04/20).  In review of yesterday's note, changes are noted above.

## 2020-12-05 LAB
BASOPHILS # BLD AUTO: 0.4 % (ref 0–1.8)
BASOPHILS # BLD: 0.04 K/UL (ref 0–0.12)
EOSINOPHIL # BLD AUTO: 0.37 K/UL (ref 0–0.51)
EOSINOPHIL NFR BLD: 3.8 % (ref 0–6.9)
ERYTHROCYTE [DISTWIDTH] IN BLOOD BY AUTOMATED COUNT: 69.3 FL (ref 35.9–50)
GLUCOSE BLD-MCNC: 120 MG/DL (ref 65–99)
GLUCOSE BLD-MCNC: 142 MG/DL (ref 65–99)
GLUCOSE BLD-MCNC: 164 MG/DL (ref 65–99)
GLUCOSE BLD-MCNC: 174 MG/DL (ref 65–99)
GLUCOSE BLD-MCNC: 201 MG/DL (ref 65–99)
HCT VFR BLD AUTO: 32.7 % (ref 42–52)
HGB BLD-MCNC: 9.7 G/DL (ref 14–18)
IMM GRANULOCYTES # BLD AUTO: 0.14 K/UL (ref 0–0.11)
IMM GRANULOCYTES NFR BLD AUTO: 1.4 % (ref 0–0.9)
INR PPP: 2.39 (ref 0.87–1.13)
LYMPHOCYTES # BLD AUTO: 0.61 K/UL (ref 1–4.8)
LYMPHOCYTES NFR BLD: 6.3 % (ref 22–41)
MCH RBC QN AUTO: 29 PG (ref 27–33)
MCHC RBC AUTO-ENTMCNC: 29.7 G/DL (ref 33.7–35.3)
MCV RBC AUTO: 97.6 FL (ref 81.4–97.8)
MONOCYTES # BLD AUTO: 0.69 K/UL (ref 0–0.85)
MONOCYTES NFR BLD AUTO: 7.1 % (ref 0–13.4)
NEUTROPHILS # BLD AUTO: 7.85 K/UL (ref 1.82–7.42)
NEUTROPHILS NFR BLD: 81 % (ref 44–72)
NRBC # BLD AUTO: 0 K/UL
NRBC BLD-RTO: 0 /100 WBC
PLATELET # BLD AUTO: 204 K/UL (ref 164–446)
PMV BLD AUTO: 10.7 FL (ref 9–12.9)
PROTHROMBIN TIME: 26.8 SEC (ref 12–14.6)
RBC # BLD AUTO: 3.35 M/UL (ref 4.7–6.1)
WBC # BLD AUTO: 9.7 K/UL (ref 4.8–10.8)

## 2020-12-05 PROCEDURE — 700111 HCHG RX REV CODE 636 W/ 250 OVERRIDE (IP): Performed by: INTERNAL MEDICINE

## 2020-12-05 PROCEDURE — 700102 HCHG RX REV CODE 250 W/ 637 OVERRIDE(OP): Performed by: STUDENT IN AN ORGANIZED HEALTH CARE EDUCATION/TRAINING PROGRAM

## 2020-12-05 PROCEDURE — 85025 COMPLETE CBC W/AUTO DIFF WBC: CPT

## 2020-12-05 PROCEDURE — 94760 N-INVAS EAR/PLS OXIMETRY 1: CPT

## 2020-12-05 PROCEDURE — 700101 HCHG RX REV CODE 250: Performed by: STUDENT IN AN ORGANIZED HEALTH CARE EDUCATION/TRAINING PROGRAM

## 2020-12-05 PROCEDURE — 85610 PROTHROMBIN TIME: CPT

## 2020-12-05 PROCEDURE — A9270 NON-COVERED ITEM OR SERVICE: HCPCS | Performed by: STUDENT IN AN ORGANIZED HEALTH CARE EDUCATION/TRAINING PROGRAM

## 2020-12-05 PROCEDURE — 700105 HCHG RX REV CODE 258: Performed by: INTERNAL MEDICINE

## 2020-12-05 PROCEDURE — 94640 AIRWAY INHALATION TREATMENT: CPT

## 2020-12-05 PROCEDURE — 97535 SELF CARE MNGMENT TRAINING: CPT | Mod: CQ

## 2020-12-05 PROCEDURE — 99232 SBSQ HOSP IP/OBS MODERATE 35: CPT | Performed by: STUDENT IN AN ORGANIZED HEALTH CARE EDUCATION/TRAINING PROGRAM

## 2020-12-05 PROCEDURE — 770009 HCHG ROOM/CARE - ONCOLOGY SEMI PRI*

## 2020-12-05 PROCEDURE — 82962 GLUCOSE BLOOD TEST: CPT | Mod: 91

## 2020-12-05 PROCEDURE — 97530 THERAPEUTIC ACTIVITIES: CPT | Mod: CQ

## 2020-12-05 RX ADMIN — AMIODARONE HYDROCHLORIDE 200 MG: 200 TABLET ORAL at 04:49

## 2020-12-05 RX ADMIN — DOCUSATE SODIUM 50 MG AND SENNOSIDES 8.6 MG 2 TABLET: 8.6; 5 TABLET, FILM COATED ORAL at 04:49

## 2020-12-05 RX ADMIN — BUDESONIDE AND FORMOTEROL FUMARATE DIHYDRATE 2 PUFF: 80; 4.5 AEROSOL RESPIRATORY (INHALATION) at 18:14

## 2020-12-05 RX ADMIN — METOPROLOL SUCCINATE 25 MG: 25 TABLET, EXTENDED RELEASE ORAL at 04:48

## 2020-12-05 RX ADMIN — CEFAZOLIN SODIUM 2 G: 2 INJECTION, SOLUTION INTRAVENOUS at 08:02

## 2020-12-05 RX ADMIN — DOCUSATE SODIUM 50 MG AND SENNOSIDES 8.6 MG 2 TABLET: 8.6; 5 TABLET, FILM COATED ORAL at 18:04

## 2020-12-05 RX ADMIN — CEFAZOLIN SODIUM 2 G: 2 INJECTION, SOLUTION INTRAVENOUS at 18:04

## 2020-12-05 RX ADMIN — OXYCODONE HYDROCHLORIDE 10 MG: 10 TABLET ORAL at 21:44

## 2020-12-05 RX ADMIN — OXYCODONE HYDROCHLORIDE 10 MG: 10 TABLET ORAL at 15:15

## 2020-12-05 RX ADMIN — OXYCODONE HYDROCHLORIDE 10 MG: 10 TABLET ORAL at 09:01

## 2020-12-05 RX ADMIN — QUETIAPINE FUMARATE 12.5 MG: 25 TABLET ORAL at 04:48

## 2020-12-05 RX ADMIN — WARFARIN SODIUM 5 MG: 5 TABLET ORAL at 18:14

## 2020-12-05 RX ADMIN — SPIRONOLACTONE 25 MG: 25 TABLET ORAL at 04:48

## 2020-12-05 RX ADMIN — MICAFUNGIN SODIUM 100 MG: 20 INJECTION, POWDER, LYOPHILIZED, FOR SOLUTION INTRAVENOUS at 15:08

## 2020-12-05 RX ADMIN — ROSUVASTATIN CALCIUM 40 MG: 20 TABLET, FILM COATED ORAL at 04:49

## 2020-12-05 RX ADMIN — IPRATROPIUM BROMIDE AND ALBUTEROL SULFATE 3 ML: .5; 3 SOLUTION RESPIRATORY (INHALATION) at 03:41

## 2020-12-05 RX ADMIN — IPRATROPIUM BROMIDE AND ALBUTEROL SULFATE 3 ML: .5; 3 SOLUTION RESPIRATORY (INHALATION) at 22:46

## 2020-12-05 RX ADMIN — CEFAZOLIN SODIUM 2 G: 2 INJECTION, SOLUTION INTRAVENOUS at 00:24

## 2020-12-05 RX ADMIN — OXYCODONE HYDROCHLORIDE 10 MG: 10 TABLET ORAL at 04:48

## 2020-12-05 RX ADMIN — FUROSEMIDE 40 MG: 40 TABLET ORAL at 04:49

## 2020-12-05 RX ADMIN — Medication: at 19:45

## 2020-12-05 RX ADMIN — FERROUS SULFATE TAB 325 MG (65 MG ELEMENTAL FE) 325 MG: 325 (65 FE) TAB at 04:48

## 2020-12-05 RX ADMIN — BUDESONIDE AND FORMOTEROL FUMARATE DIHYDRATE 2 PUFF: 80; 4.5 AEROSOL RESPIRATORY (INHALATION) at 04:47

## 2020-12-05 ASSESSMENT — COGNITIVE AND FUNCTIONAL STATUS - GENERAL
MOBILITY SCORE: 11
MOVING TO AND FROM BED TO CHAIR: A LITTLE
SUGGESTED CMS G CODE MODIFIER MOBILITY: CL
WALKING IN HOSPITAL ROOM: TOTAL
TURNING FROM BACK TO SIDE WHILE IN FLAT BAD: A LITTLE
CLIMB 3 TO 5 STEPS WITH RAILING: TOTAL
MOVING FROM LYING ON BACK TO SITTING ON SIDE OF FLAT BED: UNABLE
STANDING UP FROM CHAIR USING ARMS: A LOT

## 2020-12-05 ASSESSMENT — ENCOUNTER SYMPTOMS
BLURRED VISION: 0
PALPITATIONS: 0
DIZZINESS: 0
DOUBLE VISION: 0
CONSTIPATION: 0
NERVOUS/ANXIOUS: 0
FEVER: 0
MYALGIAS: 0
SPUTUM PRODUCTION: 0
SHORTNESS OF BREATH: 0
SINUS PAIN: 0
CHILLS: 0
SORE THROAT: 0
WHEEZING: 0
DIARRHEA: 0
HEADACHES: 0
FOCAL WEAKNESS: 0
VOMITING: 0
WEAKNESS: 1
NAUSEA: 0
COUGH: 0
ABDOMINAL PAIN: 0

## 2020-12-05 ASSESSMENT — GAIT ASSESSMENTS: GAIT LEVEL OF ASSIST: UNABLE TO PARTICIPATE

## 2020-12-05 NOTE — PROGRESS NOTES
"Received report from [facility], assumed care of pt.  Pt A&Ox3, unsure of month or date.  ANTONE PICC, patent with + blood return, dressing CDI.  Left above knee amputation, wound vac to left lateral thigh, no air leak.  Right LE discolored, swollen, warm to touch, with weeping area on dorsal foot. \"It looks better than it has\"  Pt uses bedside urinal.   All needs met at this time.    "

## 2020-12-05 NOTE — THERAPY
"Physical Therapy   Daily Treatment     Patient Name: Joshua Medrano  Age:  54 y.o., Sex:  male  Medical Record #: [id number]  Today's Date: 12/4/2020     Precautions: (P) Fall Risk, Non Weight Bearing Left Lower Extremity    Assessment    Pt pleasant, hyperverbose and easily distracted in conversation. He deferred practicing transfers today due to significant pain from recent wound back change. He completed bed mobility with spv at this time. Pt had a significant amount of questions regarding therapy in acute setting, but also receiving and electric scooter. Pt reporting he needs therapy in acute setting to provide info to company to receive scooter. Educated on practicing transfers into wc for safety first. Pt with poor insight into deficits and continue to state \" oh I can do that\" in regards to transferring yet per chart review it states family was assisting patients during transfers prior to hospitalization. Will continue to follow while in house.     Plan    Continue current treatment plan.    DC Equipment Recommendations: Unable to determine at this time  Discharge Recommendations: (P) Recommend post-acute placement for additional physical therapy services prior to discharge home       12/04/20 3768   Other Treatments   Other Treatments Provided Pt asking therapist about questions regarding recieveing a scooter. Pt asking therapist to provide paperwork to recieve a scooter. Pt than proceeded to call wife who did not have info and said to call company. They were not able to name the company. Pt asking questions about using scooter or wc here. When discussed and educated on goals of therapy during acute setting he would state \" I can do that\" regarding transfers. Educated on safety and risks of decreased strength since being here.    Balance   Sitting Balance (Static) Fair   Sitting Balance (Dynamic) Fair -   Gait Analysis   Gait Level Of Assist Unable to Participate   Bed Mobility    Supine to Sit Supervised "   Sit to Supine Supervised   Scooting Supervised   Comments able to reach EOB and back to supine multiple times with spv   Functional Mobility   Bed, Chair, Wheelchair Transfer Refused   Comments pt reporting too much pain from wound vac change.    Short Term Goals    Short Term Goal # 1 Pt will be able to complete bed<>chair transfers with SPV in 6tx in order to return home   Goal Outcome # 1 goal not met   Short Term Goal # 2 Pt will be able to demonstrate 500ft of WC mobility independently with SPV in 6tx in order to return home   Goal Outcome # 2 Goal not met

## 2020-12-05 NOTE — PROGRESS NOTES
[facility] Medicine Daily Progress Note    Date of Service  12/5/2020    Chief Complaint  54 y.o. male admitted 11/24/2020 with left AKA wound infection     Hospital Course  A 54 y.o. male with PMH of CAD, DM, COPD, CHF, and A-fib who had recent left AKA on 11/12 who left AMA from hospital, admitted on 11/24/20 as a transfer from [place], [place] for left AKA wound infection and shortness of breath secondary to CHF. ID & Ortho on board. On cefazolin 2g IV Q8H based on prior sensitivities. Ortho I & D left AKA wound on 11/26. ID added fluconazole on 11/29 for  OR cultures growing yeast    Interval Problem Update  No acute complaints.  Saturating 98% on 3 L oxygen on exam at bedside this morning.  Remains on IV cefazolin.  Pending SNF placement.    Consultants/Specialty  ID   Infectious disease    Code Status  Full Code    Disposition  [facility]  Needs placement for long term IV Abx    Review of Systems  Review of Systems   Constitutional: Negative for chills, fever and malaise/fatigue.   HENT: Negative for congestion, ear discharge, ear pain, sinus pain and sore throat.    Eyes: Negative for blurred vision and double vision.   Respiratory: Negative for cough, sputum production, shortness of breath and wheezing.    Cardiovascular: Positive for leg swelling. Negative for chest pain and palpitations.   Gastrointestinal: Negative for abdominal pain, constipation, diarrhea, nausea and vomiting.   Genitourinary: Negative for dysuria, frequency and urgency.   Musculoskeletal: Positive for joint pain. Negative for myalgias.   Neurological: Positive for weakness (diffuse). Negative for dizziness (resolved), focal weakness and headaches.   Psychiatric/Behavioral: The patient is not nervous/anxious.         Physical Exam  Temp:  [36.7 °C (98.1 °F)-37 °C (98.6 °F)] 36.7 °C (98.1 °F)  Pulse:  [67-86] 72  Resp:  [18-20] 18  BP: (100-119)/(69-76) 107/70  SpO2:  [91 %-98 %] 91 %    Physical Exam  Constitutional:       General: He is  not in acute distress.     Appearance: He is obese.   HENT:      Head: Normocephalic and atraumatic.      Nose: Nose normal.      Mouth/Throat:      Mouth: Mucous membranes are moist.      Pharynx: No posterior oropharyngeal erythema.   Eyes:      General: No scleral icterus.     Extraocular Movements: Extraocular movements intact.      Conjunctiva/sclera: Conjunctivae normal.      Pupils: Pupils are equal, round, and reactive to light.   Neck:      Musculoskeletal: Normal range of motion and neck supple. No neck rigidity.   Cardiovascular:      Rate and Rhythm: Normal rate. Rhythm irregular.      Pulses: Normal pulses.      Heart sounds: No murmur. No gallop.    Pulmonary:      Effort: Pulmonary effort is normal.      Breath sounds: Normal breath sounds. No stridor. No wheezing, rhonchi or rales.   Abdominal:      General: Bowel sounds are normal. There is distension.      Palpations: Abdomen is soft.      Tenderness: There is no abdominal tenderness. There is no guarding.   Musculoskeletal:         General: Swelling present. No tenderness.      Comments: Left AKA amputation  S/p I&D on left AKA wound, wound VAC in placed  RUE erythema & crusted areas   Skin:     General: Skin is warm.      Comments: Chronic venous stasis changes on her right lower extremity   Neurological:      General: No focal deficit present.      Mental Status: He is alert and oriented to person, place, and time.   Psychiatric:         Mood and Affect: Mood normal.         Behavior: Behavior normal.         Fluids    Intake/Output Summary (Last 24 hours) at 12/5/2020 0713  Last data filed at 12/5/2020 0500  Gross per 24 hour   Intake --   Output 600 ml   Net -600 ml       Laboratory  Recent Labs     12/03/20  0431 12/05/20  0025   WBC 8.1 9.7   RBC 3.24* 3.35*   HEMOGLOBIN 9.6* 9.7*   HEMATOCRIT 32.2* 32.7*   MCV 99.4* 97.6   MCH 29.6 29.0   MCHC 29.8* 29.7*   RDW 71.2* 69.3*   PLATELETCT 191 204   MPV 10.7 10.7         Recent Labs      12/03/20  0431 12/04/20  0320 12/05/20  0025   INR 2.81* 2.43* 2.39*               Imaging  IR-PICC LINE PLACEMENT W/ GUIDANCE > AGE 5   Final Result                  Ultrasound-guided PICC placement performed by qualified nursing staff as    above.          US-EXTREMITY VENOUS UPPER UNILAT LEFT   Final Result      CT-EXTREMITY, LOWER WITH LEFT   Final Result      Fluid collection along the distal posterior aspect of the above the knee amputation measuring 7.8 x 2.3 x 5.3 cm without significant peripheral enhancement to suggest abscess.      Extensive left thigh subcutaneous edema.      CT-ABDOMEN WITH & W/O   Final Result      Resolution of cystic pancreas lesion most consistent with pancreatic pseudocyst.      Cirrhotic appearing liver with splenomegaly.      Nonobstructive right renal stone.      Possible gallstones            US-EXTREMITY ARTERY LOWER UNILAT LEFT   Final Result      US-EXTREMITY VENOUS LOWER BILAT   Final Result      DX-CHEST-LIMITED (1 VIEW)   Final Result      Moderate bilateral interstitial opacities which likely represents pulmonary edema.      Stable cardiac silhouette enlargement.           Assessment/Plan  * Soft tissue infection- (present on admission)  Assessment & Plan  s/p initial left intitial knee amputation on 11/2/20 with Dr. Durbin, followed by completion of left AKA on 11/12.  Patient left [facility] AMA on 11/17  Presented to [facility] on 11/20 with purulent drainage from wound vac insertion site, with wound vac removal at outside hospital.   - Infectious Disease and orthopedic Sx on board  - on cefazolin 2g IV Q8H based on prior sensitivities  -I&D by orthopedic Sx on 11/26, wound VAC placed  -Pain control  - ID on board, continue Ancef x 6 weeks (end date 2/10/2020). But if the patient refuses, recommend p.o. Augmentin 875 mg twice daily for 6 weeks, along with outpatient wound care  ID added fluconazole on 11/29 for OR cultures growing yeast.  On 11/30: ID stop fluconazole & started  on Micafungin for concerning new rash.   PICC placed 12/1/2020.  Needs placement for long term IV Abx    Paroxysmal atrial fibrillation (HCC)- (present on admission)  Assessment & Plan   Continue Amiodarone 200mg QAM & metoprolol   [id number] 3    INR 2.57 on 12/1    COPD (chronic obstructive pulmonary disease) ([facility])- (present on admission)  Assessment & Plan  - COPD exacerbation on admission  - PFTS from 2018 revealed FVC 37% predicted.  FEV1 35% predicted.  No   significant response to bronchodilators.  FEV1/FVC is 73. Total lung capacity 5.29,     Diffusion DLCO 41% predicted indicating mixed obstructive and restrictive disease  - wheezing on exam and c/o shortness of breath  -Chest x-ray showed pulmonary edema  -Oxygen per protocol  -RT protocol with inhalers  - Acapella treatment  - will avoid systemic steroids at this time due to active soft tissue infection    Pulmonary HTN ([facility])  Assessment & Plan  Estimated right ventricular systolic pressure  is 40 mmHg on echo on 11/2  - likely secondary to CHF, COPD and SHASHANK  - continue to manage COPD and CHF exacerbation    Acute exacerbation of CHF (congestive heart failure) ([facility])- (present on admission)  Assessment & Plan  PMH of CAD and NSTEMI  Patient recently admitted to [facility] found to have CHF exacerbation  - Last echo on 11/2  notable for LVEF 50%, Mild aortic stenosis,   Toprol 25mg QAM  Rosuvastatin 40mg daily  Lasix 40mg daily  Aldactone 25mg daily  Daily weights and I/Os  - Resolved.  Back to home dose of furosemide.      Type 2 diabetes mellitus with complication, without long-term current use of insulin ([facility])- (present on admission)  Assessment & Plan  - A1c 7.8 on 10/30/2020  - Correctional insulin with hypoglycemia protocol  - DM diet    Hematoma of arm, right, initial encounter  Assessment & Plan  US RUE 11/27: a resolving hematoma in the right forearm  Wound care consulted.     SHASHANK (obstructive sleep apnea)- (present on admission)  Assessment &  Plan  CPAP per RT    CKD (chronic kidney disease), stage III- (present on admission)  Assessment & Plan  - continue to monitor  - Creatinine at baseline at 0.69    Essential hypertension- (present on admission)  Assessment & Plan  Continue home medications with holding parameters       VTE prophylaxis: SCDs & warfarin     I have performed a physical exam, and I have reviewed and updated ROS and Plan today (12/05/20).  In review of yesterday's note, changes are noted above.

## 2020-12-05 NOTE — PROGRESS NOTES
Inpatient Anticoagulation Service Note    Date: 12/5/2020    Reason for Anticoagulation: Atrial Fibrillation   Target INR: 2.0 to 3.0  [id number] VASc Score: 6  HAS-BLED Score: 1   Hemoglobin Value: (!) 9.7  Hematocrit Value: (!) 32.7  Lab Platelet Value: 204    INR from last 7 days     Date/Time INR Value    12/05/20 0025  (!) 2.39    12/04/20 0320  (!) 2.43    12/03/20 0431  (!) 2.81    12/02/20 0315  (!) 3.05    12/01/20 0121  (!) 2.57    11/30/20 0059  (!) 2.17    11/29/20 0234  (!) 1.95        Dose from last 7 days     Date/Time Dose (mg)    12/05/20 1015  5    12/04/20 1029  5    12/03/20 1151  5    12/02/20 0957  0    12/01/20 1248  5    11/30/20 1607  5    11/29/20 0851  7.5        Average Dose (mg): (home dose 7.5 mg Su/Tu/Th and 5 mg all other days)  Significant Interactions: Amiodarone, Antibiotics, Statin  Bridge Therapy: No  Days of Overlap Therapy: 0   INR Value Greater than 2 Prior to Discontinuation of Parenteral Anticoagulation: Not Applicable  Reversal Agent Administered: Not Applicable    Comments: INR within therapeutic range today. Cell lines are stable and there are no documented s/s of bleeding noted. No changes to warfarin-DDI profile; micafungin and cefazolin to continue x 6 weeks through 2/10/20 per ID. Patient has been accepted to [facility], waiting on bed availability. Will continue with warfarin 5 mg po daily; repeat INR with AM labs.    Education Material Provided?: No  Pharmacist suggested discharge dosing: Possibly warfarin 5 mg po daily; repeat INR within 48-72 hours of discharge.      Ruth Negron, PharmD, BCOP

## 2020-12-06 LAB
GLUCOSE BLD-MCNC: 119 MG/DL (ref 65–99)
GLUCOSE BLD-MCNC: 145 MG/DL (ref 65–99)
GLUCOSE BLD-MCNC: 164 MG/DL (ref 65–99)
GLUCOSE BLD-MCNC: 173 MG/DL (ref 65–99)
INR PPP: 2.38 (ref 0.87–1.13)
PROTHROMBIN TIME: 26.7 SEC (ref 12–14.6)

## 2020-12-06 PROCEDURE — 700102 HCHG RX REV CODE 250 W/ 637 OVERRIDE(OP): Performed by: STUDENT IN AN ORGANIZED HEALTH CARE EDUCATION/TRAINING PROGRAM

## 2020-12-06 PROCEDURE — 700111 HCHG RX REV CODE 636 W/ 250 OVERRIDE (IP): Performed by: INTERNAL MEDICINE

## 2020-12-06 PROCEDURE — 85610 PROTHROMBIN TIME: CPT

## 2020-12-06 PROCEDURE — A9270 NON-COVERED ITEM OR SERVICE: HCPCS | Performed by: STUDENT IN AN ORGANIZED HEALTH CARE EDUCATION/TRAINING PROGRAM

## 2020-12-06 PROCEDURE — 94640 AIRWAY INHALATION TREATMENT: CPT

## 2020-12-06 PROCEDURE — 700105 HCHG RX REV CODE 258: Performed by: INTERNAL MEDICINE

## 2020-12-06 PROCEDURE — 99232 SBSQ HOSP IP/OBS MODERATE 35: CPT | Performed by: STUDENT IN AN ORGANIZED HEALTH CARE EDUCATION/TRAINING PROGRAM

## 2020-12-06 PROCEDURE — 700101 HCHG RX REV CODE 250: Performed by: STUDENT IN AN ORGANIZED HEALTH CARE EDUCATION/TRAINING PROGRAM

## 2020-12-06 PROCEDURE — 82962 GLUCOSE BLOOD TEST: CPT | Mod: 91

## 2020-12-06 PROCEDURE — 770009 HCHG ROOM/CARE - ONCOLOGY SEMI PRI*

## 2020-12-06 RX ADMIN — FUROSEMIDE 40 MG: 40 TABLET ORAL at 05:02

## 2020-12-06 RX ADMIN — OXYCODONE HYDROCHLORIDE 10 MG: 10 TABLET ORAL at 10:44

## 2020-12-06 RX ADMIN — METOPROLOL SUCCINATE 25 MG: 25 TABLET, EXTENDED RELEASE ORAL at 05:02

## 2020-12-06 RX ADMIN — CEFAZOLIN SODIUM 2 G: 2 INJECTION, SOLUTION INTRAVENOUS at 23:27

## 2020-12-06 RX ADMIN — BUDESONIDE AND FORMOTEROL FUMARATE DIHYDRATE 2 PUFF: 80; 4.5 AEROSOL RESPIRATORY (INHALATION) at 17:14

## 2020-12-06 RX ADMIN — BUDESONIDE AND FORMOTEROL FUMARATE DIHYDRATE 2 PUFF: 80; 4.5 AEROSOL RESPIRATORY (INHALATION) at 05:03

## 2020-12-06 RX ADMIN — IPRATROPIUM BROMIDE AND ALBUTEROL SULFATE 3 ML: .5; 3 SOLUTION RESPIRATORY (INHALATION) at 21:29

## 2020-12-06 RX ADMIN — AMIODARONE HYDROCHLORIDE 200 MG: 200 TABLET ORAL at 05:02

## 2020-12-06 RX ADMIN — CEFAZOLIN SODIUM 2 G: 2 INJECTION, SOLUTION INTRAVENOUS at 08:00

## 2020-12-06 RX ADMIN — OXYCODONE HYDROCHLORIDE 10 MG: 10 TABLET ORAL at 15:31

## 2020-12-06 RX ADMIN — ALPRAZOLAM 0.5 MG: 0.5 TABLET ORAL at 20:50

## 2020-12-06 RX ADMIN — WARFARIN SODIUM 5 MG: 5 TABLET ORAL at 17:18

## 2020-12-06 RX ADMIN — ROSUVASTATIN CALCIUM 40 MG: 20 TABLET, FILM COATED ORAL at 05:02

## 2020-12-06 RX ADMIN — CEFAZOLIN SODIUM 2 G: 2 INJECTION, SOLUTION INTRAVENOUS at 01:13

## 2020-12-06 RX ADMIN — MICAFUNGIN SODIUM 100 MG: 20 INJECTION, POWDER, LYOPHILIZED, FOR SOLUTION INTRAVENOUS at 15:25

## 2020-12-06 RX ADMIN — FERROUS SULFATE TAB 325 MG (65 MG ELEMENTAL FE) 325 MG: 325 (65 FE) TAB at 05:02

## 2020-12-06 RX ADMIN — CEFAZOLIN SODIUM 2 G: 2 INJECTION, SOLUTION INTRAVENOUS at 17:04

## 2020-12-06 RX ADMIN — QUETIAPINE FUMARATE 12.5 MG: 25 TABLET ORAL at 05:02

## 2020-12-06 RX ADMIN — OXYCODONE HYDROCHLORIDE 10 MG: 10 TABLET ORAL at 20:49

## 2020-12-06 RX ADMIN — DOCUSATE SODIUM 50 MG AND SENNOSIDES 8.6 MG 2 TABLET: 8.6; 5 TABLET, FILM COATED ORAL at 17:04

## 2020-12-06 RX ADMIN — SPIRONOLACTONE 25 MG: 25 TABLET ORAL at 05:02

## 2020-12-06 ASSESSMENT — ENCOUNTER SYMPTOMS
NERVOUS/ANXIOUS: 0
WEAKNESS: 1
HEADACHES: 0
WHEEZING: 0
SPUTUM PRODUCTION: 0
VOMITING: 0
MYALGIAS: 0
SHORTNESS OF BREATH: 0
FEVER: 0
PALPITATIONS: 0
NAUSEA: 0
DIARRHEA: 0
CONSTIPATION: 0
BLURRED VISION: 0
DIZZINESS: 0
FOCAL WEAKNESS: 0
COUGH: 0
SINUS PAIN: 0
CHILLS: 0
DOUBLE VISION: 0
SORE THROAT: 0
ABDOMINAL PAIN: 0

## 2020-12-06 ASSESSMENT — PAIN DESCRIPTION - PAIN TYPE: TYPE: ACUTE PAIN

## 2020-12-06 NOTE — THERAPY
Physical Therapy   Daily Treatment     Patient Name: Joshua Medrano  Age:  54 y.o., Sex:  male  Medical Record #: [id number]  Today's Date: 12/5/2020     Precautions: Fall Risk, Non Weight Bearing Left Lower Extremity    Assessment    Pt was pleasant and agreeable to therapy session. He is hyper verbose and can be easily distracted requiring cues to stay focused on task. He at beginning again asking therapist about receiving an electric scooter and that someone from a company said therapy here does it. Again educated that process is usually after discharge from hospital. He is able to complete bed mobility with spv with no bed features. Started with seated scooting along side of bed for pre transfer training. He is able to complete with close SPV. Educated on using slide board and safety and form. He continues to report he can just get over to chair and that he doesn't need the board. He presents with poor insight into deficits requiring maxAx2. Step by step instructions for technique. Wheeled pt around unit and than assisted back to bed with maxAx2. Transferring back to bed pt more fatigued requiring increased verbal cues. Will continue to follow while in house.     Plan    Continue current treatment plan.    DC Equipment Recommendations: Unable to determine at this time  Discharge Recommendations: Recommend post-acute placement for additional physical therapy services prior to discharge home         12/05/20 1610   Other Treatments   Other Treatments Provided Pt again adamant about therapist providing scooter here in hospital. Educated on this is an outpatien thing and an order from physician.    Balance   Sitting Balance (Static) Fair   Sitting Balance (Dynamic) Fair -   Weight Shift Sitting Fair   Skilled Intervention Verbal Cuing;Tactile Cuing   Gait Analysis   Gait Level Of Assist Unable to Participate   Bed Mobility    Supine to Sit Supervised   Sit to Supine Supervised   Scooting Supervised  (lateral at EOB )    Skilled Intervention Verbal Cuing   Comments without bed features.    Functional Mobility   Bed, Chair, Wheelchair Transfer Maximal Assist  (x2)   Transfer Method Slide Board   Skilled Intervention Verbal Cuing;Sequencing   Comments maxA with step by step instructions on use of slideboard. more difficutly retunring to bed due to incline.    Short Term Goals    Short Term Goal # 1 Pt will be able to complete bed<>chair transfers with SPV in 6tx in order to return home   Goal Outcome # 1 goal not met   Short Term Goal # 2 Pt will be able to demonstrate 500ft of WC mobility independently with SPV in 6tx in order to return home   Goal Outcome # 2 Goal not met

## 2020-12-06 NOTE — PROGRESS NOTES
Inpatient Anticoagulation Service Note    Date: 12/6/2020    Reason for Anticoagulation: Atrial Fibrillation   Target INR: 2.0 to 3.0  [id number] VASc Score: 6  HAS-BLED Score: 1   Hemoglobin Value: (!) 9.7  Hematocrit Value: (!) 32.7  Lab Platelet Value: 204    INR from last 7 days     Date/Time INR Value    12/06/20 0117  (!) 2.38    12/05/20 0025  (!) 2.39    12/04/20 0320  (!) 2.43    12/03/20 0431  (!) 2.81    12/02/20 0315  (!) 3.05    12/01/20 0121  (!) 2.57    11/30/20 0059  (!) 2.17        Dose from last 7 days     Date/Time Dose (mg)    12/06/20 1035  5    12/05/20 1015  5    12/04/20 1029  5    12/03/20 1151  5    12/02/20 0957  0    12/01/20 1248  5    11/30/20 1607  5        Average Dose (mg): (home dose 7.5 mg Su/Tu/Th and 5 mg all other days)  Significant Interactions: Antibiotics, Amiodarone, Statin, Other (Comments)(Quetiapine)  Bridge Therapy: No  Days of Overlap Therapy: 0   INR Value Greater than 2 Prior to Discontinuation of Parenteral Anticoagulation: Not Applicable     Reversal Agent Administered: Not Applicable  Comments: INR remains therapeutic today. No new CBC collected, however there are no documented s/s of bleeding noted. No changes to warfarin-DDI profile; micafungin and cefazolin to continue x 6 weeks through 2/10/20 per ID. Anticipated discharge to LTAC/SNF. Documented PO intake is consistent. Will continue with warfarin 5 mg po daily. Given INR stability will check M,W,F.    Education Material Provided?: No  Pharmacist suggested discharge dosing: Warfarin 5 mg po daily; repeat INR within 72 hours of discharge.      Ruth Negron, PharmD, BCOP

## 2020-12-06 NOTE — PROGRESS NOTES
"Received report from [facility], resumed care 0700.  Pt sitting up at edge of bed. Reminded pt of need to reposition as he has redness/peeling/fragility to buttocks. Pt declined my suggestions at interventions of lying on side and propping with pillows. Education given regarding skin breakdown. \"It's been like that for a long time\". Orange top paste in use.  LUE PICC patent, + blood return, dressing changed as was not intact.  AKA left; wound vac on, no air leak pressure at 125 as per wound order. Right leg tight, red, dorsal aspect of foot had some oozing, cleansed with wound cleanser and dry gauze.   All needs met at this time.   "

## 2020-12-06 NOTE — PROGRESS NOTES
Hospital Medicine Daily Progress Note    Date of Service  12/6/2020    Chief Complaint  54 y.o. male admitted 11/24/2020 with left AKA wound infection     Hospital Course  A 54 y.o. male with PMH of CAD, DM, COPD, CHF, and A-fib who had recent left AKA on 11/12 who left AMA from hospital, admitted on 11/24/20 as a transfer from [place], [place] for left AKA wound infection and shortness of breath secondary to CHF. ID & Ortho on board. On cefazolin 2g IV Q8H based on prior sensitivities. Ortho I & D left AKA wound on 11/26. ID added fluconazole on 11/29 for  OR cultures growing yeast    Interval Problem Update  No acute complaints this morning apart from frustrated that his son cannot visit him in the hospital because his wife is his designated visitor.  He understands, however, that this is hospital policy for the safety of everyone given the COVID-19 pandemic.  Saturating 96% on 3 L oxygen.  Continuing with IV cefazolin.    Consultants/Specialty  ID   Infectious disease    Code Status  Full Code    Disposition  [facility]  Needs placement for long term IV Abx    Review of Systems  Review of Systems   Constitutional: Negative for chills, fever and malaise/fatigue.   HENT: Negative for congestion, ear discharge, ear pain, sinus pain and sore throat.    Eyes: Negative for blurred vision and double vision.   Respiratory: Negative for cough, sputum production, shortness of breath and wheezing.    Cardiovascular: Positive for leg swelling. Negative for chest pain and palpitations.   Gastrointestinal: Negative for abdominal pain, constipation, diarrhea, nausea and vomiting.   Genitourinary: Negative for dysuria, frequency and urgency.   Musculoskeletal: Negative for joint pain and myalgias.   Neurological: Positive for weakness (diffuse). Negative for dizziness (resolved), focal weakness and headaches.   Psychiatric/Behavioral: The patient is not nervous/anxious.         Physical Exam  Temp:  [36.5 °C (97.7 °F)-37 °C  (98.6 °F)] 36.6 °C (97.9 °F)  Pulse:  [63-74] 63  Resp:  [16-20] 16  BP: (100-110)/(57-64) 100/57  SpO2:  [95 %-100 %] 96 %    Physical Exam  Constitutional:       General: He is not in acute distress.     Appearance: He is obese.   HENT:      Head: Normocephalic and atraumatic.      Nose: Nose normal.      Mouth/Throat:      Mouth: Mucous membranes are moist.      Pharynx: No posterior oropharyngeal erythema.   Eyes:      General: No scleral icterus.     Extraocular Movements: Extraocular movements intact.      Conjunctiva/sclera: Conjunctivae normal.      Pupils: Pupils are equal, round, and reactive to light.   Neck:      Musculoskeletal: Normal range of motion and neck supple. No neck rigidity.   Cardiovascular:      Rate and Rhythm: Normal rate. Rhythm irregular.      Pulses: Normal pulses.      Heart sounds: No murmur. No gallop.    Pulmonary:      Effort: Pulmonary effort is normal.      Breath sounds: Normal breath sounds. No stridor. No wheezing, rhonchi or rales.   Abdominal:      General: Bowel sounds are normal. There is distension.      Palpations: Abdomen is soft.      Tenderness: There is no abdominal tenderness. There is no guarding.   Musculoskeletal:         General: Swelling present. No tenderness.      Comments: Left AKA amputation  S/p I&D on left AKA wound, wound VAC in placed  RUE erythema & crusted areas   Skin:     General: Skin is warm.      Comments: Chronic venous stasis changes - right lower extremity.   Neurological:      General: No focal deficit present.      Mental Status: He is alert and oriented to person, place, and time.   Psychiatric:         Mood and Affect: Mood normal.         Behavior: Behavior normal.         Fluids    Intake/Output Summary (Last 24 hours) at 12/6/2020 0929  Last data filed at 12/5/2020 1820  Gross per 24 hour   Intake 240 ml   Output 400 ml   Net -160 ml       Laboratory  Recent Labs     12/05/20  0025   WBC 9.7   RBC 3.35*   HEMOGLOBIN 9.7*   HEMATOCRIT  32.7*   MCV 97.6   MCH 29.0   MCHC 29.7*   RDW 69.3*   PLATELETCT 204   MPV 10.7         Recent Labs     12/04/20  0320 12/05/20  0025 12/06/20  0117   INR 2.43* 2.39* 2.38*               Imaging  IR-PICC LINE PLACEMENT W/ GUIDANCE > AGE 5   Final Result                  Ultrasound-guided PICC placement performed by qualified nursing staff as    above.          US-EXTREMITY VENOUS UPPER UNILAT LEFT   Final Result      CT-EXTREMITY, LOWER WITH LEFT   Final Result      Fluid collection along the distal posterior aspect of the above the knee amputation measuring 7.8 x 2.3 x 5.3 cm without significant peripheral enhancement to suggest abscess.      Extensive left thigh subcutaneous edema.      CT-ABDOMEN WITH & W/O   Final Result      Resolution of cystic pancreas lesion most consistent with pancreatic pseudocyst.      Cirrhotic appearing liver with splenomegaly.      Nonobstructive right renal stone.      Possible gallstones            US-EXTREMITY ARTERY LOWER UNILAT LEFT   Final Result      US-EXTREMITY VENOUS LOWER BILAT   Final Result      DX-CHEST-LIMITED (1 VIEW)   Final Result      Moderate bilateral interstitial opacities which likely represents pulmonary edema.      Stable cardiac silhouette enlargement.           Assessment/Plan  * Soft tissue infection- (present on admission)  Assessment & Plan  s/p initial left intitial knee amputation on 11/2/20 with Dr. Durbin, followed by completion of left AKA on 11/12.  Patient left [facility] AMA on 11/17.  Presented to [facility] on 11/20 with purulent drainage from wound vac insertion site, with wound vac removal at outside hospital.   - Infectious Disease and Orthopedic Surgery consulted this admission.  -I&D by orthopedic Sx on 11/26, wound VAC placed  -Pain control  -Per ID, continue Ancef x 6 weeks (end date 2/10/2020).  If patient refuses or tries to leave AMA, ID recommends p.o. Augmentin 875 mg twice daily for 6 weeks along with outpatient wound care  - ID initially  added fluconazole on 11/29 for OR cultures growing yeast.  On 11/30: ID stop fluconazole & started on Micafungin for concerning new rash.   PICC placed 12/1/2020.  Needs placement for long term IV Abx.    Paroxysmal atrial fibrillation (HCC)- (present on admission)  Assessment & Plan  Continue amiodarone 200 mg daily & metoprolol 25 mg daily  [id number] 3    Warfarin per Pharmacy - INR 2.38 on 12/6    COPD (chronic obstructive pulmonary disease) ([facility])- (present on admission)  Assessment & Plan  - COPD exacerbation on admission; now resolved  - PFTS from 2018 revealed FVC 37% predicted.  FEV1 35% predicted.  No   significant response to bronchodilators.  FEV1/FVC is 73. Total lung capacity 5.29,     Diffusion DLCO 41% predicted indicating mixed obstructive and restrictive disease  -Oxygen per protocol  -RT protocol with inhalers    Pulmonary HTN ([facility])  Assessment & Plan  Estimated right ventricular systolic pressure  is 40 mmHg on echo on 11/2  - likely secondary to CHF, COPD and SHASHANK    Acute exacerbation of CHF (congestive heart failure) ([facility])- (present on admission)  Assessment & Plan  PMH of CAD and NSTEMI  Patient recently admitted to [facility] found to have CHF exacerbation  - Last echo on 11/2  notable for LVEF 50%, Mild aortic stenosis,   Toprol 25mg QAM  Rosuvastatin 40mg daily  Lasix 40mg daily  Aldactone 25mg daily  Daily weights and I/Os  - Resolved.  Back to home dose of furosemide.      Type 2 diabetes mellitus with complication, without long-term current use of insulin ([facility])- (present on admission)  Assessment & Plan  - A1c 7.8 on 10/30/2020  - Correctional insulin with hypoglycemia protocol  - DM diet    Hematoma of arm, right, initial encounter  Assessment & Plan  US RUE 11/27: a resolving hematoma in the right forearm  Wound care consulted.     SHASHANK (obstructive sleep apnea)- (present on admission)  Assessment & Plan  CPAP per RT    CKD (chronic kidney disease), stage III- (present on  admission)  Assessment & Plan  - Monitor BMP.     Essential hypertension- (present on admission)  Assessment & Plan  Stable  Monitor vitals       VTE prophylaxis: SCDs & warfarin     I have performed a physical exam, and I have reviewed and updated ROS and Plan today (12/06/20).  In review of yesterday's note, changes are noted above.

## 2020-12-07 LAB
GLUCOSE BLD-MCNC: 121 MG/DL (ref 65–99)
GLUCOSE BLD-MCNC: 133 MG/DL (ref 65–99)
GLUCOSE BLD-MCNC: 182 MG/DL (ref 65–99)
GLUCOSE BLD-MCNC: 189 MG/DL (ref 65–99)

## 2020-12-07 PROCEDURE — 700102 HCHG RX REV CODE 250 W/ 637 OVERRIDE(OP): Performed by: STUDENT IN AN ORGANIZED HEALTH CARE EDUCATION/TRAINING PROGRAM

## 2020-12-07 PROCEDURE — 700101 HCHG RX REV CODE 250: Performed by: STUDENT IN AN ORGANIZED HEALTH CARE EDUCATION/TRAINING PROGRAM

## 2020-12-07 PROCEDURE — A9270 NON-COVERED ITEM OR SERVICE: HCPCS | Performed by: STUDENT IN AN ORGANIZED HEALTH CARE EDUCATION/TRAINING PROGRAM

## 2020-12-07 PROCEDURE — 700105 HCHG RX REV CODE 258: Performed by: INTERNAL MEDICINE

## 2020-12-07 PROCEDURE — 99232 SBSQ HOSP IP/OBS MODERATE 35: CPT | Performed by: STUDENT IN AN ORGANIZED HEALTH CARE EDUCATION/TRAINING PROGRAM

## 2020-12-07 PROCEDURE — 770009 HCHG ROOM/CARE - ONCOLOGY SEMI PRI*

## 2020-12-07 PROCEDURE — 82962 GLUCOSE BLOOD TEST: CPT | Mod: 91

## 2020-12-07 PROCEDURE — 700111 HCHG RX REV CODE 636 W/ 250 OVERRIDE (IP): Performed by: INTERNAL MEDICINE

## 2020-12-07 PROCEDURE — 700111 HCHG RX REV CODE 636 W/ 250 OVERRIDE (IP): Performed by: STUDENT IN AN ORGANIZED HEALTH CARE EDUCATION/TRAINING PROGRAM

## 2020-12-07 PROCEDURE — 97605 NEG PRS WND THER DME<=50SQCM: CPT

## 2020-12-07 RX ADMIN — QUETIAPINE FUMARATE 12.5 MG: 25 TABLET ORAL at 06:11

## 2020-12-07 RX ADMIN — IPRATROPIUM BROMIDE AND ALBUTEROL SULFATE 3 ML: .5; 3 SOLUTION RESPIRATORY (INHALATION) at 22:55

## 2020-12-07 RX ADMIN — SPIRONOLACTONE 25 MG: 25 TABLET ORAL at 06:10

## 2020-12-07 RX ADMIN — ROSUVASTATIN CALCIUM 40 MG: 20 TABLET, FILM COATED ORAL at 06:10

## 2020-12-07 RX ADMIN — MORPHINE SULFATE 1 MG: 4 INJECTION INTRAVENOUS at 11:43

## 2020-12-07 RX ADMIN — FUROSEMIDE 40 MG: 40 TABLET ORAL at 06:11

## 2020-12-07 RX ADMIN — CEFAZOLIN SODIUM 2 G: 2 INJECTION, SOLUTION INTRAVENOUS at 06:17

## 2020-12-07 RX ADMIN — AMIODARONE HYDROCHLORIDE 200 MG: 200 TABLET ORAL at 06:11

## 2020-12-07 RX ADMIN — MICAFUNGIN SODIUM 100 MG: 20 INJECTION, POWDER, LYOPHILIZED, FOR SOLUTION INTRAVENOUS at 15:05

## 2020-12-07 RX ADMIN — METOPROLOL SUCCINATE 25 MG: 25 TABLET, EXTENDED RELEASE ORAL at 06:11

## 2020-12-07 RX ADMIN — BUDESONIDE AND FORMOTEROL FUMARATE DIHYDRATE 2 PUFF: 80; 4.5 AEROSOL RESPIRATORY (INHALATION) at 06:09

## 2020-12-07 RX ADMIN — BUDESONIDE AND FORMOTEROL FUMARATE DIHYDRATE 2 PUFF: 80; 4.5 AEROSOL RESPIRATORY (INHALATION) at 17:14

## 2020-12-07 RX ADMIN — CEFAZOLIN SODIUM 2 G: 2 INJECTION, SOLUTION INTRAVENOUS at 22:59

## 2020-12-07 RX ADMIN — CEFAZOLIN SODIUM 2 G: 2 INJECTION, SOLUTION INTRAVENOUS at 17:10

## 2020-12-07 RX ADMIN — WARFARIN SODIUM 5 MG: 5 TABLET ORAL at 17:15

## 2020-12-07 RX ADMIN — FERROUS SULFATE TAB 325 MG (65 MG ELEMENTAL FE) 325 MG: 325 (65 FE) TAB at 06:11

## 2020-12-07 RX ADMIN — OXYCODONE HYDROCHLORIDE 10 MG: 10 TABLET ORAL at 11:44

## 2020-12-07 RX ADMIN — ALPRAZOLAM 0.5 MG: 0.5 TABLET ORAL at 22:33

## 2020-12-07 ASSESSMENT — ENCOUNTER SYMPTOMS
HEADACHES: 0
DIZZINESS: 0
FOCAL WEAKNESS: 0
SHORTNESS OF BREATH: 0
DIARRHEA: 0
SPUTUM PRODUCTION: 0
PALPITATIONS: 0
WEAKNESS: 1
SORE THROAT: 0
DOUBLE VISION: 0
CONSTIPATION: 0
NAUSEA: 0
CHILLS: 0
SINUS PAIN: 0
BLURRED VISION: 0
COUGH: 0
FEVER: 0
VOMITING: 0
ABDOMINAL PAIN: 0
NERVOUS/ANXIOUS: 0
MYALGIAS: 0
WHEEZING: 0

## 2020-12-07 NOTE — PROGRESS NOTES
Inpatient Anticoagulation Service Note    Date: 2020    Reason for Anticoagulation: Atrial Fibrillation   Target INR: 2.0 to 3.0  [id number] VASc Score: 6  HAS-BLED Score: 1   Hemoglobin Value: (!) 9.7  Hematocrit Value: (!) 32.7  Lab Platelet Value: 204    INR from last 7 days     Date/Time INR Value    20 0117  (!) 2.38    20 0025  (!) 2.39    20 0320  (!) 2.43    20 0431  (!) 2.81    20 0315  (!) 3.05    20 0121  (!) 2.57        Dose from last 7 days     Date/Time Dose (mg)    20 1050  5    20 1035  5    20 1015  5    20 1029  5    20 1151  5    20 0957  0    20 1248  5    20 1607  5        Average Dose (mg): (home dose 7.5 mg // and 5 mg all other days)  Significant Interactions: Amiodarone, Antibiotics, Statin  Bridge Therapy: No  Days of Overlap Therapy: n/a  Reversal Agent Administered: Not Applicable  Comments: NNL 20, no s/s of active bleeding. INR within therapeutic range 20. Antibiotics/micafungin palnned to contue through 2/10/20(6 weeks). Conatinue 5mg daily with INR MWF to begin on 20.    Plan:  Continue 5mg daily  Education Material Provided?: No(not a new warfarin pt)  Pharmacist suggested discharge dosinmg daily. INR within 72 hours of discharge.     William Guzman, PharmD        '

## 2020-12-07 NOTE — WOUND TEAM
[facility]  Inpatient Services  Wound and Skin Care Progress Note    Admission Date:  11/24/2020   HPI, PMH, SH: Reviewed  Unit where seen by Wound Team: [id number]    WOUND CONSULT RELATED TO:  Scheduled Negative Pressure Wound Therapy (NPWT) dressing change.      SUBJECTIVE:   Self Report / Pain Level:  Holding leg in anticipation of pain    OBJECTIVE:  Previous dressing intact. Patient on pressure redistribution mattress with waffle overlay. Moves self.     WOUND TYPE, LOCATION, CHARACTERISTICS (Pressure ulcers: location, stage, POA or date identified)         Negative Pressure Wound Therapy 11/26/20 Surgical Thigh (Active)   NPWT Pump Mode / Pressure Setting Continuous;125 mmHg 12/07/20 1300   Dressing Type Small;Black Foam (Regular) 12/07/20 1300   Number of Foam Pieces Used 2 12/04/20 1400   Canister Changed No 12/07/20 1300   Output (mL) 0 mL 12/03/20 0600   NEXT Dressing Change/Treatment Date 12/09/20 12/07/20 0915           Wound 10/30/20 Full Thickness Wound Leg;Foot Circumferential Right POA (Active)       Wound 11/26/20 Full Thickness Wound Thigh Lateral Left open surgical site (Active)   Wound Image   12/04/20 1400   Site Assessment Red 12/07/20 1300   Periwound Assessment [place] 12/07/20 1300   Margins Defined edges 12/07/20 1300   Closure Secondary intention 12/07/20 1300   Drainage Amount Scant 12/07/20 1300   Drainage Description Serosanguineous 12/07/20 1300   Treatments Site care;Cleansed 12/07/20 1300   Wound Cleansing Approved Wound Cleanser 12/07/20 1300   Periwound Protectant Skin Protectant Wipes to Periwound;Drape 12/07/20 1300   Dressing Cleansing/Solutions Not Applicable 12/07/20 1300   Dressing Options Wound Vac 12/07/20 1300   Dressing Changed Changed 12/04/20 1400   Dressing Status Clean;Dry;Intact 12/07/20 1300   Dressing Change/Treatment Frequency Monday, Wednesday, Friday, and As Needed 12/07/20 1300   NEXT Dressing Change/Treatment Date 12/11/20 12/07/20 0915   NEXT  Weekly Photo (Inpatient Only) 12/11/20 12/04/20 1400   Non-staged Wound Description Full thickness 12/07/20 1300   Wound Length (cm) 5.6 cm 12/04/20 1400   Wound Width (cm) 1.3 cm 12/04/20 1400   Wound Depth (cm) 2 cm 12/04/20 1400   Wound Surface Area (cm^2) 7.28 cm^2 12/04/20 1400   Wound Volume (cm^3) 14.56 cm^3 12/04/20 1400   Wound Healing % 44 12/04/20 1400   Tunneling (cm) 5.5 cm 11/28/20 1100   Tunneling Clock Position of Wound 6 11/28/20 1100   Shape irregular 12/04/20 1400   Wound Odor None 12/07/20 1300   Pulses N/A 11/30/20 1400   Exposed Structures None 12/07/20 1300   WOUND NURSE ONLY - Time Spent with Patient (mins) 60 12/07/20 1300           Vascular: N/A    Lab Values     Results for JARRETT WHITE (MRN [id number]) as of 11/28/2020 11:30   Ref. Range 11/27/2020 04:17 11/28/2020 01:01   WBC Latest Ref Range: 4.8 - 10.8 K/uL 9.1    RBC Latest Ref Range: 4.70 - 6.10 M/uL 3.15 (L)    Hemoglobin Latest Ref Range: 14.0 - 18.0 g/dL 9.2 (L) 9.4 (L)   Hematocrit Latest Ref Range: 42.0 - 52.0 % 31.7 (L) 32.3 (L)     AIC:      Lab Results   Component Value Date/Time    HBA1C 7.8 (H) 10/30/2020 06:53 AM         Culture:   Completed 11/26/20    INTERVENTIONS BY WOUND TEAM:   Soaked foam with NS prior to removal.  Soaked foam again with NS. Foam removed. Inspected wound carefully and found No retained foam .  Cleaned wound with NS. Applied No Sting and and dermatac drape to periwound skin. One strip of foam into wound bed and tract, the one piece for TRAC pad. Sealed with drape. Resumed VAC at 125 mmHg continuous, no leaks noted.     Interdisciplinary consultation: Patient, Bedside RN,     EVALUATION:   dermatac affective for decreasing pain with dressing removal.  There was depth to the wound but no distinct tract at this point.  Wound bed granular.      12/4Wound has some small slough but overall appears to be red and mostly clean. Periwound red. Pt reports that his skin is sensitive. Area around picc line also  noted to be red. Dermatac used for periwound to hopefully help with his skin. Continued NPWT dressing to promote tissue granulation and drainage control.     Factors affecting wound healing:  DM2, Obese, CHF  Past Medical History:   Past Medical History was reviewed with patient.   has a past medical history of ASTHMA, Atrial fibrillation (HCC), CAD (coronary artery disease), Chronic obstructive pulmonary disease (HCC), Congestive heart failure (HCC), and Diabetes.     Past Surgical History: Past Surgical History was reviewed with patient.   has a past surgical history that includes other abdominal surgery; multiple coronary artery bypass endo vein harvest (12/22/2017); oscar (12/22/2017); thoracoscopy (Left, 1/25/2018); colonoscopy - endo (N/A, 7/25/2018); knee amputation below (Left, 11/4/2020); and knee amputation above (Left, 11/12/2020).     Goals:  Steady decrease in wound area and depth weekly     NURSING PLAN OF CARE ORDERS (X):    Dressing changes: See Dressing Care orders:   X  Skin care: See Skin Care orders:   Rectal tube care: See Rectal Tube Care orders:   Other orders:    WOUND TEAM PLAN OF CARE (X):   NPWT change 3 x week:    X    Dressing changes by wound team:       Follow up as needed:       Other (explain):    Anticipated discharge plans (X): Patient will need advanced care for wound VAC and also rehab as patient is an new AKA.   SNF:         X  Home Care:    X       Outpatient Wound Center:            Self Care:            Other:

## 2020-12-07 NOTE — DISCHARGE PLANNING
Anticipated Discharge Disposition: LTAC    Action: [facility] has accepted pt pending bed availability. Had lengthy discussion with pt about alternatives. Pt wants to go HOME. States he has financial obligations and business transactions that he needs to attend to. Explained that he needs continued wound care with wound vac and long term IV abx. Pt has no PCP in [facility] and very complex med history . It unlikely that physician will be willing to take on this case without first seeing pt in office. Will cont to await bed at [facility]    Barriers to Discharge: Pending [facility] accept.    Plan: Cont to F/U for [facility] placement.

## 2020-12-07 NOTE — PROGRESS NOTES
[facility] Medicine Daily Progress Note    Date of Service  12/7/2020    Chief Complaint  54 y.o. male admitted 11/24/2020 with left AKA wound infection     Hospital Course  A 54 y.o. male with PMH of CAD, DM, COPD, CHF, and A-fib who had recent left AKA on 11/12 who left AMA from hospital, admitted on 11/24/20 as a transfer from [place], [place] for left AKA wound infection and shortness of breath secondary to CHF. ID & Ortho on board. On cefazolin 2g IV Q8H based on prior sensitivities. Ortho I & D left AKA wound on 11/26. ID added fluconazole on 11/29 for  OR cultures growing yeast    Interval Problem Update  No acute events overnight. Remains on IV cefazolin.  Pending SNF placement.    Consultants/Specialty  ID   Infectious disease    Code Status  Full Code    Disposition  [facility]  Needs placement for long term IV Abx    Review of Systems  Review of Systems   Constitutional: Negative for chills, fever and malaise/fatigue.   HENT: Negative for congestion, ear discharge, ear pain, sinus pain and sore throat.    Eyes: Negative for blurred vision and double vision.   Respiratory: Negative for cough, sputum production, shortness of breath and wheezing.    Cardiovascular: Positive for leg swelling. Negative for chest pain and palpitations.   Gastrointestinal: Negative for abdominal pain, constipation, diarrhea, nausea and vomiting.   Genitourinary: Negative for dysuria, frequency and urgency.   Musculoskeletal: Positive for joint pain. Negative for myalgias.   Neurological: Positive for weakness (diffuse). Negative for dizziness (resolved), focal weakness and headaches.   Psychiatric/Behavioral: The patient is not nervous/anxious.         Physical Exam  Temp:  [36.3 °C (97.4 °F)-36.9 °C (98.5 °F)] 36.8 °C (98.3 °F)  Pulse:  [65-98] 98  Resp:  [18] 18  BP: (108-134)/(66-78) 114/75  SpO2:  [95 %-100 %] 95 %    Physical Exam  Constitutional:       General: He is not in acute distress.     Appearance: He is obese.    HENT:      Head: Normocephalic and atraumatic.      Nose: Nose normal.      Mouth/Throat:      Mouth: Mucous membranes are moist.      Pharynx: No posterior oropharyngeal erythema.   Eyes:      General: No scleral icterus.     Extraocular Movements: Extraocular movements intact.      Conjunctiva/sclera: Conjunctivae normal.      Pupils: Pupils are equal, round, and reactive to light.   Neck:      Musculoskeletal: Normal range of motion and neck supple. No neck rigidity.   Cardiovascular:      Rate and Rhythm: Normal rate. Rhythm irregular.      Pulses: Normal pulses.      Heart sounds: No murmur. No gallop.    Pulmonary:      Effort: Pulmonary effort is normal.      Breath sounds: Normal breath sounds. No stridor. No wheezing, rhonchi or rales.   Abdominal:      General: Bowel sounds are normal. There is distension.      Palpations: Abdomen is soft.      Tenderness: There is no abdominal tenderness. There is no guarding.   Musculoskeletal:         General: Swelling present. No tenderness.      Comments: Left AKA amputation  S/p I&D on left AKA wound, wound VAC in placed  RUE erythema & crusted areas   Skin:     General: Skin is warm.      Comments: Chronic venous stasis changes on her right lower extremity   Neurological:      General: No focal deficit present.      Mental Status: He is alert and oriented to person, place, and time.   Psychiatric:         Mood and Affect: Mood normal.         Behavior: Behavior normal.         Fluids    Intake/Output Summary (Last 24 hours) at 12/7/2020 1508  Last data filed at 12/7/2020 1202  Gross per 24 hour   Intake 200 ml   Output 1700 ml   Net -1500 ml       Laboratory  Recent Labs     12/05/20  0025   WBC 9.7   RBC 3.35*   HEMOGLOBIN 9.7*   HEMATOCRIT 32.7*   MCV 97.6   MCH 29.0   MCHC 29.7*   RDW 69.3*   PLATELETCT 204   MPV 10.7         Recent Labs     12/05/20  0025 12/06/20  0117   INR 2.39* 2.38*               Imaging  IR-PICC LINE PLACEMENT W/ GUIDANCE > AGE 5   Final  Result                  Ultrasound-guided PICC placement performed by qualified nursing staff as    above.          US-EXTREMITY VENOUS UPPER UNILAT LEFT   Final Result      CT-EXTREMITY, LOWER WITH LEFT   Final Result      Fluid collection along the distal posterior aspect of the above the knee amputation measuring 7.8 x 2.3 x 5.3 cm without significant peripheral enhancement to suggest abscess.      Extensive left thigh subcutaneous edema.      CT-ABDOMEN WITH & W/O   Final Result      Resolution of cystic pancreas lesion most consistent with pancreatic pseudocyst.      Cirrhotic appearing liver with splenomegaly.      Nonobstructive right renal stone.      Possible gallstones            US-EXTREMITY ARTERY LOWER UNILAT LEFT   Final Result      US-EXTREMITY VENOUS LOWER BILAT   Final Result      DX-CHEST-LIMITED (1 VIEW)   Final Result      Moderate bilateral interstitial opacities which likely represents pulmonary edema.      Stable cardiac silhouette enlargement.           Assessment/Plan  * Soft tissue infection- (present on admission)  Assessment & Plan  s/p initial left intitial knee amputation on 11/2/20 with Dr. Durbin, followed by completion of left AKA on 11/12.  Patient left [facility] AMA on 11/17.  Presented to [facility] on 11/20 with purulent drainage from wound vac insertion site, with wound vac removal at outside hospital.   - Infectious Disease and Orthopedic Surgery consulted this admission.  -I&D by orthopedic Sx on 11/26, wound VAC placed  -Pain control  -Per ID, continue Ancef x 6 weeks (end date 2/10/2020).  If patient refuses or tries to leave AMA, ID recommends p.o. Augmentin 875 mg twice daily for 6 weeks along with outpatient wound care  - ID initially added fluconazole on 11/29 for OR cultures growing yeast.  On 11/30: ID stop fluconazole & started on Micafungin for concerning new rash.   PICC placed 12/1/2020.  Needs placement for long term IV Abx.    Paroxysmal atrial fibrillation (HCC)-  (present on admission)  Assessment & Plan  Continue amiodarone 200 mg daily & metoprolol 25 mg daily  [id number]    Warfarin per Pharmacy - INR 2.38 on 12/6    COPD (chronic obstructive pulmonary disease) ([facility])- (present on admission)  Assessment & Plan  - COPD exacerbation on admission; now resolved  - PFTS from 2018 revealed FVC 37% predicted.  FEV1 35% predicted.  No   significant response to bronchodilators.  FEV1/FVC is 73. Total lung capacity 5.29,     Diffusion DLCO 41% predicted indicating mixed obstructive and restrictive disease  -Oxygen per protocol  -RT protocol with inhalers    Pulmonary HTN ([facility])  Assessment & Plan  Estimated right ventricular systolic pressure  is 40 mmHg on echo on 11/2  - likely secondary to CHF, COPD and SHASHANK    Acute exacerbation of CHF (congestive heart failure) ([facility])- (present on admission)  Assessment & Plan  PMH of CAD and NSTEMI  Patient recently admitted to [facility] found to have CHF exacerbation  - Last echo on 11/2  notable for LVEF 50%, Mild aortic stenosis,   Toprol 25mg QAM  Rosuvastatin 40mg daily  Lasix 40mg daily  Aldactone 25mg daily  Daily weights and I/Os  - Resolved.  Back to home dose of furosemide.      Type 2 diabetes mellitus with complication, without long-term current use of insulin ([facility])- (present on admission)  Assessment & Plan  - A1c 7.8 on 10/30/2020  - Correctional insulin with hypoglycemia protocol  - DM diet    Hematoma of arm, right, initial encounter  Assessment & Plan  US RUE 11/27: a resolving hematoma in the right forearm  Wound care consulted.     SHASHANK (obstructive sleep apnea)- (present on admission)  Assessment & Plan  CPAP per RT    CKD (chronic kidney disease), stage III- (present on admission)  Assessment & Plan  - Monitor BMP.     Essential hypertension- (present on admission)  Assessment & Plan  Stable  Monitor vitals       VTE prophylaxis: SCDs & warfarin

## 2020-12-08 VITALS
RESPIRATION RATE: 18 BRPM | HEIGHT: 77 IN | BODY MASS INDEX: 37.19 KG/M2 | OXYGEN SATURATION: 94 % | HEART RATE: 69 BPM | TEMPERATURE: 98.4 F | DIASTOLIC BLOOD PRESSURE: 69 MMHG | SYSTOLIC BLOOD PRESSURE: 112 MMHG | WEIGHT: 315 LBS

## 2020-12-08 LAB
BACTERIA WND AEROBE CULT: ABNORMAL
BACTERIA WND AEROBE CULT: ABNORMAL
FUNGAL MIC INTERP  7292: NORMAL
GLUCOSE BLD-MCNC: 116 MG/DL (ref 65–99)
GLUCOSE BLD-MCNC: 188 MG/DL (ref 65–99)
GRAM STN SPEC: ABNORMAL
SIGNIFICANT IND 70042: ABNORMAL
SIGNIFICANT IND 70042: NORMAL
SITE SITE: ABNORMAL
SITE SITE: NORMAL
SOURCE SOURCE: ABNORMAL
SOURCE SOURCE: NORMAL

## 2020-12-08 PROCEDURE — 700102 HCHG RX REV CODE 250 W/ 637 OVERRIDE(OP): Performed by: STUDENT IN AN ORGANIZED HEALTH CARE EDUCATION/TRAINING PROGRAM

## 2020-12-08 PROCEDURE — A9270 NON-COVERED ITEM OR SERVICE: HCPCS | Performed by: STUDENT IN AN ORGANIZED HEALTH CARE EDUCATION/TRAINING PROGRAM

## 2020-12-08 PROCEDURE — 97110 THERAPEUTIC EXERCISES: CPT

## 2020-12-08 PROCEDURE — 700111 HCHG RX REV CODE 636 W/ 250 OVERRIDE (IP): Performed by: INTERNAL MEDICINE

## 2020-12-08 PROCEDURE — 99239 HOSP IP/OBS DSCHRG MGMT >30: CPT | Performed by: STUDENT IN AN ORGANIZED HEALTH CARE EDUCATION/TRAINING PROGRAM

## 2020-12-08 PROCEDURE — 700105 HCHG RX REV CODE 258: Performed by: INTERNAL MEDICINE

## 2020-12-08 PROCEDURE — 82962 GLUCOSE BLOOD TEST: CPT | Mod: 91

## 2020-12-08 RX ORDER — CEFAZOLIN SODIUM 2 G/100ML
2 INJECTION, SOLUTION INTRAVENOUS EVERY 8 HOURS
Qty: 3000 ML | Status: SHIPPED
Start: 2020-12-08 | End: 2021-02-10

## 2020-12-08 RX ORDER — QUETIAPINE FUMARATE 25 MG/1
12.5 TABLET, FILM COATED ORAL DAILY
Qty: 60 TAB | Refills: 3 | Status: SHIPPED
Start: 2020-12-09

## 2020-12-08 RX ORDER — WARFARIN SODIUM 5 MG/1
5 TABLET ORAL DAILY
Qty: 30 TAB | Refills: 3 | Status: SHIPPED
Start: 2020-12-08

## 2020-12-08 RX ORDER — ALBUTEROL SULFATE 90 UG/1
2 AEROSOL, METERED RESPIRATORY (INHALATION) EVERY 4 HOURS
Qty: 8.5 G | Status: SHIPPED
Start: 2020-12-08

## 2020-12-08 RX ORDER — IPRATROPIUM BROMIDE AND ALBUTEROL SULFATE 2.5; .5 MG/3ML; MG/3ML
3 SOLUTION RESPIRATORY (INHALATION) EVERY 6 HOURS PRN
Status: SHIPPED
Start: 2020-12-08

## 2020-12-08 RX ORDER — FUROSEMIDE 40 MG/1
40 TABLET ORAL DAILY
Qty: 30 TAB | Status: SHIPPED
Start: 2020-12-09

## 2020-12-08 RX ORDER — SPIRONOLACTONE 25 MG/1
25 TABLET ORAL EVERY MORNING
Qty: 30 TAB | Refills: 3 | Status: SHIPPED
Start: 2020-12-09

## 2020-12-08 RX ORDER — METOPROLOL SUCCINATE 25 MG/1
25 TABLET, EXTENDED RELEASE ORAL EVERY MORNING
Qty: 30 TAB | Status: SHIPPED
Start: 2020-12-09

## 2020-12-08 RX ORDER — ALBUTEROL SULFATE 90 UG/1
2 AEROSOL, METERED RESPIRATORY (INHALATION)
Status: DISCONTINUED | OUTPATIENT
Start: 2020-12-08 | End: 2020-12-08 | Stop reason: HOSPADM

## 2020-12-08 RX ADMIN — METOPROLOL SUCCINATE 25 MG: 25 TABLET, EXTENDED RELEASE ORAL at 05:05

## 2020-12-08 RX ADMIN — DOCUSATE SODIUM 50 MG AND SENNOSIDES 8.6 MG 2 TABLET: 8.6; 5 TABLET, FILM COATED ORAL at 16:47

## 2020-12-08 RX ADMIN — OXYCODONE HYDROCHLORIDE 10 MG: 10 TABLET ORAL at 17:28

## 2020-12-08 RX ADMIN — CEFAZOLIN SODIUM 2 G: 2 INJECTION, SOLUTION INTRAVENOUS at 08:18

## 2020-12-08 RX ADMIN — QUETIAPINE FUMARATE 12.5 MG: 25 TABLET ORAL at 05:06

## 2020-12-08 RX ADMIN — DOCUSATE SODIUM 50 MG AND SENNOSIDES 8.6 MG 2 TABLET: 8.6; 5 TABLET, FILM COATED ORAL at 05:05

## 2020-12-08 RX ADMIN — AMIODARONE HYDROCHLORIDE 200 MG: 200 TABLET ORAL at 05:06

## 2020-12-08 RX ADMIN — FERROUS SULFATE TAB 325 MG (65 MG ELEMENTAL FE) 325 MG: 325 (65 FE) TAB at 05:05

## 2020-12-08 RX ADMIN — MICAFUNGIN SODIUM 100 MG: 20 INJECTION, POWDER, LYOPHILIZED, FOR SOLUTION INTRAVENOUS at 13:36

## 2020-12-08 RX ADMIN — SPIRONOLACTONE 25 MG: 25 TABLET ORAL at 05:05

## 2020-12-08 RX ADMIN — BUDESONIDE AND FORMOTEROL FUMARATE DIHYDRATE 2 PUFF: 80; 4.5 AEROSOL RESPIRATORY (INHALATION) at 05:12

## 2020-12-08 RX ADMIN — FUROSEMIDE 40 MG: 40 TABLET ORAL at 05:06

## 2020-12-08 RX ADMIN — WARFARIN SODIUM 5 MG: 5 TABLET ORAL at 16:47

## 2020-12-08 RX ADMIN — OXYCODONE HYDROCHLORIDE 10 MG: 10 TABLET ORAL at 08:17

## 2020-12-08 RX ADMIN — CEFAZOLIN SODIUM 2 G: 2 INJECTION, SOLUTION INTRAVENOUS at 15:42

## 2020-12-08 RX ADMIN — BUDESONIDE AND FORMOTEROL FUMARATE DIHYDRATE 2 PUFF: 80; 4.5 AEROSOL RESPIRATORY (INHALATION) at 16:51

## 2020-12-08 RX ADMIN — ROSUVASTATIN CALCIUM 40 MG: 20 TABLET, FILM COATED ORAL at 05:05

## 2020-12-08 ASSESSMENT — COGNITIVE AND FUNCTIONAL STATUS - GENERAL
HELP NEEDED FOR BATHING: A LOT
SUGGESTED CMS G CODE MODIFIER DAILY ACTIVITY: CK
TOILETING: A LOT
PERSONAL GROOMING: A LITTLE
DRESSING REGULAR LOWER BODY CLOTHING: A LOT
DRESSING REGULAR UPPER BODY CLOTHING: A LITTLE
DAILY ACTIVITIY SCORE: 16

## 2020-12-08 ASSESSMENT — PAIN DESCRIPTION - PAIN TYPE
TYPE: ACUTE PAIN

## 2020-12-08 NOTE — CARE PLAN
Problem: Communication  Goal: The ability to communicate needs accurately and effectively will improve  Outcome: PROGRESSING AS EXPECTED     Problem: Safety  Goal: Will remain free from injury  Outcome: PROGRESSING AS EXPECTED     Problem: Infection  Goal: Will remain free from infection  Outcome: PROGRESSING AS EXPECTED  Intervention: Assess signs and symptoms of infection  Note: IV ABX given

## 2020-12-08 NOTE — DISCHARGE PLANNING
Care Transition Team Discharge Planning    Anticipated Discharge Disposition: d/c to LTAC for rehab    Action: Lsw spoke to pt at bedside w/ MD and pt's spouse on phone.    Pt appears very anxious which appears to manifest as agitation, and arguing.     Pt states he will accept emotional support for his recent amputation.    Pt signed COBRA allowing him to be p/u at 5pm for ltac admission.    Lsw spoke to Celine graf w/ Ltac throughout day.    She is aware as above and will have psych see pt at ltac.     Barriers to Discharge: none     Plan: pt d/cing to ltac tonight at 5pm via rem

## 2020-12-08 NOTE — DISCHARGE SUMMARY
Discharge Summary    CHIEF COMPLAINT ON ADMISSION  No chief complaint on file.      Reason for Admission  Cellulits of left surgical stump, *     CODE STATUS  Full Code    HPI & HOSPITAL COURSE  A 54 y.o. male with PMH of CAD, DM, COPD, CHF, and A-fib who had recent left AKA on 11/12 who left AMA from hospital, admitted on 11/24/20 as a transfer from [place], [place] for left AKA wound infection and shortness of breath secondary to CHF. ID & Ortho on board. On cefazolin 2g IV Q8H based on prior sensitivities. Ortho performed I & D on left AKA wound on 11/26, wound vac applied to L AKA wound. ID added fluconazole on 11/29 for OR cultures growing yeast. Fluconazole changed to micafungin given rash appearance with fluconazole.   Patient needs 6 weeks IV antibiotics given proximity of infection to femur, cefazolin end date 2/10. PICC line placed. Patient reported frustration over being in the hospital for 6 weeks. Given his AMA discharge during last hospitalization, it would have been reasonable to challenge patient with augmentin and fluconazole for allergic reaction, and give patient augmentin and fluconazole oral prescriptions but thankfully patient has not attempted leaving AMA. He has been accepted to [facility] for continued IV antibiotics and wound care.    Therefore, he is discharged in fair and stable condition to a long-term acute care hospital.    The patient met 2-midnight criteria for an inpatient stay at the time of discharge.      FOLLOW UP ITEMS POST DISCHARGE  To be determined by [facility], needs follow up with PCP, ortho, wound care.    DISCHARGE DIAGNOSES  Principal Problem:    Soft tissue infection POA: Yes  Active Problems:    COPD (chronic obstructive pulmonary disease) ([facility]) POA: Yes    Paroxysmal atrial fibrillation (HCC) POA: Yes    Type 2 diabetes mellitus with complication, without long-term current use of insulin ([facility]) POA: Yes    Acute exacerbation of CHF (congestive heart failure) ([facility]) POA: Yes     Pulmonary HTN (HCC) POA: Unknown    Hematoma of arm, right, initial encounter POA: Unknown    Essential hypertension POA: Yes    CKD (chronic kidney disease), stage III POA: Yes    SHASHANK (obstructive sleep apnea) POA: Yes  Resolved Problems:    Cellulitis POA: Yes      FOLLOW UP  No future appointments.  No follow-up provider specified.    MEDICATIONS ON DISCHARGE     Medication List      ASK your doctor about these medications      Instructions   albuterol 108 (90 Base) MCG/ACT Aers inhalation aerosol   Inhale 2 Puffs by mouth every 6 hours as needed for Shortness of Breath.  Dose: 2 Puff     amiodarone 200 MG Tabs  Commonly known as: Cordarone   Take 1 Tab by mouth every morning.  Dose: 200 mg     ferrous sulfate 325 (65 Fe) MG tablet   Take 325 mg by mouth every morning.  Dose: 325 mg     furosemide 40 MG Tabs  Commonly known as: LASIX   Take 40 mg by mouth every day.  Dose: 40 mg     ipratropium-albuterol 0.5-2.5 (3) MG/3ML nebulizer solution  Commonly known as: DUONEB   3 mL by Nebulization route every 6 hours as needed for Shortness of Breath.  Dose: 3 mL     lisinopril 2.5 MG Tabs  Commonly known as: PRINIVIL   Take 1 Tab by mouth every morning.  Dose: 2.5 mg     metoprolol SR 25 MG Tb24  Commonly known as: TOPROL XL   Take 1 Tab by mouth every morning.  Dose: 25 mg     potassium chloride SA 10 MEQ Tbcr  Commonly known as: K-DUR   Take 10 mEq by mouth every day.  Dose: 10 mEq     rosuvastatin 40 MG tablet  Commonly known as: CRESTOR   Take 1 Tab by mouth every morning.  Dose: 40 mg     spironolactone 25 MG Tabs  Commonly known as: ALDACTONE   Take 1 Tab by mouth every morning.  Dose: 25 mg     tiotropium 18 MCG Caps  Commonly known as: SPIRIVA   Inhale 18 mcg by mouth every morning.  Dose: 18 mcg     warfarin 7.5 MG Tabs  Commonly known as: COUMADIN   Take 7.5 mg by mouth every day.  Dose: 7.5 mg            Allergies  Allergies   Allergen Reactions   • Cillins [Penicillins] Anaphylaxis and Rash     As a child;  tolerated cefepime (12/2017), ceftriaxone (1/2018), cefazolin (12/2017)   • Erythromycin Anaphylaxis     Rxn - 1994   • Shellfish Allergy Anaphylaxis     Pt stated that he is allergic to all seafood, will develop a rash and says that rash will clear up with benadryl   • Metoprolol Unspecified     Pt stated got latham and aggressive, in demi dose's per wife.      • Fluconazole      Rash developed and subsided upon discontinuation 12.2020         DIET  Orders Placed This Encounter   Procedures   • Diet Order Diet: Cardiac     Standing Status:   Standing     Number of Occurrences:   1     Order Specific Question:   Diet:     Answer:   Cardiac [6]       ACTIVITY  As tolerated and directed by skilled nursing.  Weight bearing as tolerated    LINES, DRAINS, AND WOUNDS  This is an automated list. Peripheral IVs will be removed prior to discharge.  PICC Single Lumen 12/01/20 Left Brachial (Active)   Site Assessment Clean;Dry;Intact 12/08/20 0831   Line Status Blood return noted;Flushed;Scrubbed the hub prior to access;Infusing 12/08/20 0831   Line Secured at (cm) 0 cm 12/07/20 0931   Extremity Circumference (cm) 34 cm 12/01/20 1515   Dressing Type Biopatch;Transparent 12/08/20 0831   Dressing Status Clean;Dry;Intact 12/08/20 0831   Dressing Intervention N/A 12/07/20 2300   Dressing Change Due 12/12/20 12/07/20 2300   Date Primary Tubing Changed 12/08/20 12/08/20 0500   Date Secondary Tubing Changed 12/08/20 12/08/20 0831   Date IV Connector(s) Changed 12/08/20 12/08/20 0500   NEXT Primary Tubing Change  12/12/20 12/08/20 0500   NEXT Secondary Tubing Change  12/09/20 12/08/20 0500   NEXT IV Connector(s) Change 12/08/20 12/07/20 2300   Line Necessity Assessed Antibiotic Therapy Greater than 7 Days 12/07/20 2300       Wound 10/30/20 Full Thickness Wound Leg;Foot Circumferential Right POA (Active)       Wound 11/26/20 Full Thickness Wound Thigh Lateral Left open surgical site (Active)   Wound Image   12/04/20 1400   Site  Assessment JUAREZ 12/08/20 0817   Periwound Assessment Pink 12/08/20 0817   Margins Defined edges 12/07/20 1300   Closure Secondary intention 12/07/20 1300   Drainage Amount Scant 12/07/20 1300   Drainage Description Serosanguineous 12/07/20 1300   Treatments Site care;Cleansed 12/07/20 1300   Wound Cleansing Approved Wound Cleanser 12/07/20 1300   Periwound Protectant Skin Protectant Wipes to Periwound;Drape 12/07/20 1300   Dressing Cleansing/Solutions Not Applicable 12/07/20 1300   Dressing Options Wound Vac 12/08/20 0817   Dressing Changed Observed 12/07/20 2022   Dressing Status Clean;Dry;Intact 12/08/20 0817   Dressing Change/Treatment Frequency Monday, Wednesday, Friday, and As Needed 12/08/20 0817   NEXT Dressing Change/Treatment Date 12/11/20 12/07/20 2022   NEXT Weekly Photo (Inpatient Only) 12/11/20 12/04/20 1400   Non-staged Wound Description Full thickness 12/07/20 1300   Wound Length (cm) 5.6 cm 12/04/20 1400   Wound Width (cm) 1.3 cm 12/04/20 1400   Wound Depth (cm) 2 cm 12/04/20 1400   Wound Surface Area (cm^2) 7.28 cm^2 12/04/20 1400   Wound Volume (cm^3) 14.56 cm^3 12/04/20 1400   Wound Healing % 44 12/04/20 1400   Tunneling (cm) 5.5 cm 11/28/20 1100   Tunneling Clock Position of Wound 6 11/28/20 1100   Shape irregular 12/04/20 1400   Wound Odor None 12/07/20 1300   Pulses N/A 11/30/20 1400   Exposed Structures None 12/07/20 1300   WOUND NURSE ONLY - Time Spent with Patient (mins) 60 12/07/20 1300      PICC Single Lumen 12/01/20 Left Brachial (Active)   Site Assessment Clean;Dry;Intact 12/08/20 0831   Line Status Blood return noted;Flushed;Scrubbed the hub prior to access;Infusing 12/08/20 0831   Line Secured at (cm) 0 cm 12/07/20 0931   Extremity Circumference (cm) 34 cm 12/01/20 1515   Dressing Type Biopatch;Transparent 12/08/20 0831   Dressing Status Clean;Dry;Intact 12/08/20 0831   Dressing Intervention N/A 12/07/20 2300   Dressing Change Due 12/12/20 12/07/20 2300   Date Primary Tubing Changed  12/08/20 12/08/20 0500   Date Secondary Tubing Changed 12/08/20 12/08/20 0831   Date IV Connector(s) Changed 12/08/20 12/08/20 0500   NEXT Primary Tubing Change  12/12/20 12/08/20 0500   NEXT Secondary Tubing Change  12/09/20 12/08/20 0500   NEXT IV Connector(s) Change 12/08/20 12/07/20 2300   Line Necessity Assessed Antibiotic Therapy Greater than 7 Days 12/07/20 2300                MENTAL STATUS ON TRANSFER  Level of Consciousness: Alert  Orientation : Oriented x 4  Speech: Speech Clear    CONSULTATIONS  Infectious disease  Orthopedic surgery    PROCEDURES  L AKA amputation, I&D, wound vac placement  PICC line placement    LABORATORY  Lab Results   Component Value Date    SODIUM 135 12/02/2020    POTASSIUM 4.3 12/02/2020    CHLORIDE 95 (L) 12/02/2020    CO2 32 12/02/2020    GLUCOSE 110 (H) 12/02/2020    BUN 16 12/02/2020    CREATININE 0.85 12/02/2020        Lab Results   Component Value Date    WBC 9.7 12/05/2020    HEMOGLOBIN 9.7 (L) 12/05/2020    HEMATOCRIT 32.7 (L) 12/05/2020    PLATELETCT 204 12/05/2020        Total time of the discharge process exceeds 36 minutes.

## 2020-12-08 NOTE — PROGRESS NOTES
Pt A&Ox4. Pt very distraught today about losing his sister-in-law. Pt declines offer to talk with /spiritual support. VSS on 3L O2 NC. POC discussed with patient; all questions and concerns addressed.  Pt continues to receive IV cefazolin and IV micafungin through L PICC line, CDI. Pt medically stable to discharge to [facility] with PICC line in place so facility can administer long-term antibiotics (thru 2/10/21).  Pt upset about change of environment with plan to D/C to LTAC, but after speaking with , Dr. Greene, and wife, pt is agreeable.  Managing pain with PRN oxycodone.   Tolerating cardiac diet, denies nausea. Monitoring BG ACHS, treating elevated BG with SSI.  Skin CDI. Wound vac to L AKA -- will clamp at D/C so [facility] can resume with their wound vac at facility.   Urinating without issue.   Pt free from falls. Turning self in bed.  Sitting up on edge of bed most of day. Bed alarm on, call light within reach, bed in lowest locked position, hourly rounding in place.   Pt discharging to [facility] today at 1700 via ambulance.  RN to RN report called at [phone] with Kate COPELAND.

## 2020-12-08 NOTE — PROGRESS NOTES
Received report from night RN. POC discussed with patient, pt verbalizes understanding and agreement.  A&Ox4. Pt is doing well, he is frustrated and wants to go home. Wound vac in place to L thigh. Pt denies c/o pain. Call light in reach, bed in low position, hourly rounding in place.

## 2020-12-08 NOTE — DISCHARGE PLANNING
Received Transport Form at 1325  Spoke to Bradly at [facility]  Transport is scheduled for today at 1700 going to [facility] approved (Dagoberto Grigsby aware of transport time.

## 2020-12-09 NOTE — THERAPY
"Occupational Therapy  Daily Treatment     Patient Name: Joshua Medrano  Age:  54 y.o., Sex:  male  Medical Record #: [id number]  Today's Date: 12/8/2020     Precautions: Fall Risk, Non Weight Bearing Left Lower Extremity  Comments: L AKA with wound vac    Assessment    Pt seen for OT session, agreeable to UE exercises but refused functional transfer practice. Pt provided HEP and instructions for UE positioning to execute each exercise appropriately. Pt had difficulty following instruction, required hand over hand facilitation for positioning for each exercise. 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 Pt return demonstrated UE exercises with green theraband. Pt again refused to practice a functional transfer and states he will be leaving for another facility tonight. If pt remains in-house acute OT will continue to follow.     Plan    Continue current treatment plan.    DC Equipment Recommendations: Grab Bar(s) in Tub / Shower, Grab Bar(s) by Toilet, Tub Transfer Bench, Other (Comments)(slide board)  Discharge Recommendations: Recommend post-acute placement for additional occupational therapy services prior to discharge home    Subjective    \"I'll be able to do it, I don't need to practice.\" (regarding functional transfers)     Objective     12/08/20 1610   Precautions   Precautions Fall Risk;Non Weight Bearing Left Lower Extremity   Comments L AKA with wound vac   Cognition    Speech/ Communication Dysarthric   Level of Consciousness Alert   New Learning Impaired   Attention Impaired   Comments verbose, requires direct cues for attention   Sitting Upper Body Exercises   Chest Press Bilateral;Medium Resistance Theraband   Front Arm Raise Bilateral;Medium Resistance Theraband   Shoulder Press Bilateral;Medium Resistance Theraband   Bicep Curls Bilateral;Medium Resistance Theraband   Tricep Press Bilateral;Medium Resistance Theraband   Comments green theraband provided   Balance   Weight " "Shift Sitting Fair   Skilled Intervention Verbal Cuing   Comments sits up at EOB with RLE hanging off the bed   Bed Mobility    Supine to Sit Supervised   Sit to Supine Supervised   Scooting Supervised   Skilled Intervention Verbal Cuing   Activities of Daily Living   Eating Modified Independent   Grooming Supervision;Seated   Upper Body Dressing Supervision   Lower Body Dressing Maximal Assist   Toileting   (not tested refused)   Functional Mobility   Sit to Stand Refused   Bed, Chair, Wheelchair Transfer Refused   Mobility pt refused functional transfer attempt, states \"I'll be able to do it, I don't need to practice.\"   Skilled Intervention Verbal Cuing   Short Term Goals   Short Term Goal # 1 pt will demo slide board functional transfer with Imelda   Goal Outcome # 1 Goal not met   Short Term Goal # 2 pt will complete toileting ADL with SPV   Goal Outcome # 2 Goal not met   Anticipated Discharge Equipment and Recommendations   DC Equipment Recommendations Grab Bar(s) in Tub / Shower;Grab Bar(s) by Toilet;Tub Transfer Bench;Other (Comments)  (slide board)   Discharge Recommendations Recommend post-acute placement for additional occupational therapy services prior to discharge home     "

## 2020-12-14 ENCOUNTER — TELEPHONE (OUTPATIENT)
Dept: MEDICAL GROUP | Facility: MEDICAL CENTER | Age: 55
End: 2020-12-14

## 2020-12-14 NOTE — TELEPHONE ENCOUNTER
OP Anticoagulation Telephone Note    Date: 12/14/2020     Pt is due for INR, recent discharge from hospital on fluconazole. Left VM for pt to have INR checked.     Khalida Comer, Pharm D

## 2020-12-18 ENCOUNTER — TELEPHONE (OUTPATIENT)
Dept: VASCULAR LAB | Facility: MEDICAL CENTER | Age: 55
End: 2020-12-18

## 2020-12-19 NOTE — TELEPHONE ENCOUNTER
Called pt to see about getting an updated INR given he was recently discharged from the hospital and was started on fluconazole. Pt denied any s/s of bleed.    Pt states he is currently in [place] for the forseeable future and plans to find a lab in which he can get a blood draw. Gave pt our clinic number so that he can let us know which lab so we can send over a standing order to manage pt via lab draw/phone.    Pepito Perkins, ZakD

## 2020-12-22 ENCOUNTER — TELEPHONE (OUTPATIENT)
Dept: VASCULAR LAB | Facility: MEDICAL CENTER | Age: 55
End: 2020-12-22

## 2020-12-22 NOTE — TELEPHONE ENCOUNTER
[facility]    Left VM with pt requesting a call back to discuss which lab we can send and INR order.  Requested rylee back so we can help coordinate care.  Pt recently started abx and needs close follow up.    Noel Vegas, PharmD

## 2020-12-28 ENCOUNTER — TELEPHONE (OUTPATIENT)
Dept: VASCULAR LAB | Facility: MEDICAL CENTER | Age: 55
End: 2020-12-28

## 2020-12-28 NOTE — TELEPHONE ENCOUNTER
[facility]     LVM with pt requesting a call back to discuss which lab we can send and INR order.      Requested c/b so we can help coordinate care.  Pt recently started abx and needs close follow up.    Pepito Perkins, ZakD

## 2020-12-29 ENCOUNTER — TELEPHONE (OUTPATIENT)
Dept: VASCULAR LAB | Facility: MEDICAL CENTER | Age: 55
End: 2020-12-29

## 2020-12-30 NOTE — TELEPHONE ENCOUNTER
[facility]     LVM with pt requesting a call back to discuss which lab we can send and INR order.       Requested c/b so we can help coordinate care.  Pt recently started abx and needs close follow up.    This is fifth attempt to reach patient regarding matter above.    Johnathan Chen, PharmD, BCACP

## 2020-12-31 ENCOUNTER — TELEPHONE (OUTPATIENT)
Dept: VASCULAR LAB | Facility: MEDICAL CENTER | Age: 55
End: 2020-12-31

## 2020-12-31 NOTE — TELEPHONE ENCOUNTER
LM with patient once again regarding need for updated INR given recent hospitalization and discharge.  Attempted other phone numbers in profile, rang and went to quick busy signal.  Will continue to reach out to patient to stress importance of prompt follow up with INR.  Johnathan Chen, PharmD, BCACP

## 2021-01-04 ENCOUNTER — TELEPHONE (OUTPATIENT)
Dept: VASCULAR LAB | Facility: MEDICAL CENTER | Age: 56
End: 2021-01-04

## 2021-01-20 ENCOUNTER — TELEPHONE (OUTPATIENT)
Dept: VASCULAR LAB | Facility: MEDICAL CENTER | Age: 56
End: 2021-01-20

## 2021-01-22 ENCOUNTER — TELEPHONE (OUTPATIENT)
Dept: VASCULAR LAB | Facility: MEDICAL CENTER | Age: 56
End: 2021-01-22

## 2021-01-22 NOTE — LETTER
Joshua Medrano  [address]  [place] [address]    01/22/21    Dear Joshua Medrano ,    We have been unsuccessful in our attempts to contact you regarding your Anticoagulation Service appointments. Warfarin is a potent blood-thinning agent that requires monitoring to ensure that the dosage is correct for your body.  If it isn't, you could develop serious, sometimes life-threatening bleeding problems or life-threatening blood clots or stroke could result.    To monitor you effectively, we need to be able to communicate with you.  This is a requirement to be followed by our Service.       If you repeatedly fail to keep your lab appointments, you are at risk of being discharged from the Anticoagulation Service.    It is extremely important that you contact the clinic as soon as possible to arrange appropriate follow up.  We are open Monday-Friday 8 am until 5 pm.  You may reach our Service at [phone].           Sincerely,           Noel Vegas, PharmD, [facility]  Clinic Supervisor  [facility]  Outpatient Anticoagulation Service

## 2021-01-22 NOTE — TELEPHONE ENCOUNTER
Left VM for pt with overdue INR to have INR drawn. Pt as not responded to multiple phone calls. Will send letter.    Drew Rosen, ZakD

## 2021-01-25 ENCOUNTER — TELEPHONE (OUTPATIENT)
Dept: INFECTIOUS DISEASES | Facility: MEDICAL CENTER | Age: 56
End: 2021-01-25

## 2021-01-25 ENCOUNTER — TELEPHONE (OUTPATIENT)
Dept: MEDICAL GROUP | Facility: MEDICAL CENTER | Age: 56
End: 2021-01-25

## 2021-01-26 NOTE — ANESTHESIA QCDR
2019 [facility] Clinical Data Registry (for Quality Improvement)     Postoperative nausea/vomiting risk protocol (Adult = 18 yrs and Pediatric 3-17 yrs)- (430 and 463)  General inhalation anesthetic (NOT TIVA) with PONV risk factors: No  Provision of anti-emetic therapy with at least 2 different classes of agents: N/A  Patient DID NOT receive anti-emetic therapy and reason is documented in Medical Record: N/A    Multimodal Pain Management- (477)  Non-emergent surgery AND patient age >= 18: Yes  Use of Multimodal Pain Management, two or more drugs and/or interventions, NOT including systemic opioids: Yes  Exception: Documented allergy to multiple classes of analgesics: N/A    Smoking Abstinence (404)  Patient is current smoker (cigarette, pipe, e-cig, marijuanna): No  Elective Surgery:   Abstinence instructions provided prior to day of surgery:   Patient abstained from smoking on day of surgery:     Pre-Op Beta-Blocker in Isolated CABG (44)  Isolated CABG AND patient age >= 18: No  Beta-blocker admin within 24 hours of surgical incision:   Exception:of medical reason(s) for not administering beta blocker within 24 hours prior to surgical incision (e.g., not  indicated,other medical reason):     PACU assessment of acute postoperative pain prior to Anesthesia Care End- Applies to Patients Age = 18- (ABG7)  Initial PACU pain score is which of the following: < 7/10  Patient unable to report pain score: N/A    Post-anesthetic transfer of care checklist/protocol to PACU/ICU- (426 and 427)  Upon conclusion of case, patient transferred to which of the following locations: PACU/Non-ICU  Use of transfer checklist/protocol: Yes  Exclusion: Service Performed in Patient Hospital Room (and thus did not require transfer): N/A  Unplanned admission to ICU related to anesthesia service up through end of PACU care- (MD51)  Unplanned admission to ICU (not initially anticipated at anesthesia start time): No          2

## 2021-02-05 ENCOUNTER — TELEPHONE (OUTPATIENT)
Dept: VASCULAR LAB | Facility: MEDICAL CENTER | Age: 56
End: 2021-02-05

## 2021-02-05 NOTE — LETTER
Joshua Medrano  [address]  [place] [address]    02/05/21    Dear Joshua Medrano ,    We have been unsuccessful in our attempts to contact you regarding your Anticoagulation Service appointments. Warfarin is a potent blood-thinning agent that requires monitoring to ensure that the dosage is correct for your body.  If it isn't, you could develop serious, sometimes life-threatening bleeding problems or life-threatening blood clots or stroke could result.    To monitor you effectively, we need to be able to communicate with you.  This is a requirement to be followed by our Service.       If you repeatedly fail to keep your lab appointments, you are at risk of being discharged from the Anticoagulation Service.    It is extremely important that you contact the clinic as soon as possible to arrange appropriate follow up.  We are open Monday-Friday 8 am until 5 pm.  You may reach our Service at [phone].           Sincerely,           Noel Vegas, PharmD, [facility]  Clinic Supervisor  [facility]  Outpatient Anticoagulation Service

## 2021-02-05 NOTE — TELEPHONE ENCOUNTER
Left message for pt to have INR checked  10th call  Emergency contacts contacted  2nd letter sent  Briana Roberson, Clinical Pharmacist, CDE, CACP

## 2021-02-18 ENCOUNTER — ANTICOAGULATION MONITORING (OUTPATIENT)
Dept: VASCULAR LAB | Facility: MEDICAL CENTER | Age: 56
End: 2021-02-18

## 2021-02-18 DIAGNOSIS — I48.0 PAROXYSMAL ATRIAL FIBRILLATION (HCC): ICD-10-CM

## 2021-02-18 NOTE — PROGRESS NOTES
[facility]    Pt hasn't responded to several phone calls and separate letters of compliance.  Emergency contacts have been exhausted.  Pt discharged from our services due to non-compliance.     Noel Vegas, PharmD    CC   To  (no current PCP)

## 2021-06-09 NOTE — PROGRESS NOTES
Cardiovascular Surgery Progress Note    Name: Joshua Medrano  MRN: [id number]  : 1965  Admit Date: 2017 10:21 AM  Procedure:  Procedure(s) and Anesthesia Type:     * MULTIPLE CORONARY ARTERY BYPASS ENDO VEIN HARVEST X2 - General     * JIM - General  5 Day Post-Op    Vitals:  Patient Vitals for the past 8 hrs:   Monitored Temp SpO2 O2 Delivery Pulse Heart Rate (Monitored) Resp NIBP Weight   17 0700 37.3 °C (99.1 °F) - - 70 70 19 (!) 81/42 -   17 0653 - 96 % - - 84 - - -   17 0645 37.4 °C (99.3 °F) - - 70 70 (!) 26 (!) 93/50 -   17 0630 37.3 °C (99.1 °F) - - 70 72 (!) 26 (!) 90/45 -   17 0615 - - - - - - (!) 94/48 -   17 0600 - 97 % - 69 69 (!) 23 (!) 99/58 (!) 159 kg (350 lb 8.5 oz)   17 0545 - - - 70 70 (!) 26 - -   17 0530 - - - 70 72 (!) 26 102/54 -   17 0515 - - - 70 70 (!) 26 (!) 95/54 -   17 0500 - 96 % - 70 70 (!) 26 (!) 97/52 -   17 0445 - - - 70 70 (!) 26 (!) 94/51 -   17 0430 - - - 71 70 (!) 29 (!) 93/46 -   17 0415 - - - 70 - - - -   17 0400 37.6 °C (99.7 °F) 93 % Ventilator 69 70 (!) 26 104/57 -   17 0345 - - - 70 70 (!) 26 - -   17 0330 - - - 70 70 (!) 26 (!) 99/55 -   17 0315 - - - 69 70 (!) 26 (!) 98/57 -   17 0300 - - - 70 70 (!) 26 107/57 -   17 0245 - - - 70 70 (!) 26 (!) 97/55 -   17 0234 - 96 % - - 70 - - -   17 0230 - - - 70 70 (!) 26 (!) 97/53 -   17 0215 37.7 °C (99.9 °F) - - 70 71 (!) 26 (!) 94/52 -   17 0200 37.7 °C (99.9 °F) 96 % - 69 71 (!) 26 (!) 99/52 -   17 0145 37.7 °C (99.9 °F) - - 70 70 (!) 26 (!) 97/51 -   17 0130 37.8 °C (100 °F) - - 70 70 (!) 26 (!) 95/51 -   17 0115 37.8 °C (100 °F) - - 65 71 (!) 26 (!) 95/51 -   17 0100 37.8 °C (100 °F) 98 % - 70 70 (!) 26 (!) 96/53 -     Temp (24hrs), Av.3 °C (100.9 °F), Min:38 °C (100.4 °F), Max:38.4 °C (101.1 °F)      Respiratory:  Leo Vent Mode: APVCMV, Rate  Referred By: Wiliam Tena MD    Date Of Service: 06/30/2020    Chief Complaint: Right leg swelling    History of Present Illness:    Satish Lang presents to the [facility] as a new consult to evaluate right leg swelling.   He has an underlying family history of coronary artery disease which is very strong.  He has thoracic aortic ectasia, coronary artery calcification, mild aortic insufficiency and history of prostate cancer treated March 7, 2012 with robotic prostatectomy with bilateral pelvic lymph node dissection per . Mr. Lang reports his PSA's have been normal.   The swelling began at the end of October 2019 he awkwardly landed on his right foot and had immediate right Achilles tendon pain caused by rupture right Achilles tendon. He had repair early December 2019 complicated by infection and then worsening swelling.  About three years ago he had a severe sprain of the left ankle which was treated with boot with healing.  He tried JASWANT hose, but doesn't wear them much.  They helped some. He still has a brace for heavy activities.  The swelling been stable since onset.  Discomfort is described as achey.  The swelling is improved with elevation.  The patient sleeps in a bed.  There has been no new numbness, tingling or weakness.  There have been no new masses, rashes, or swellings of any other joints. There has been no new decrease in appetite, unexplained weight loss, abdominal bloating and bowel or bladder changes.    Past Medical History:   Diagnosis Date     Achilles tendon rupture 2019     Aortic ectasia (H)      Cataracts, bilateral      Hypercholesterolemia      Hypertension      Prostate cancer (H)      Thoracic aortic ectasia (H)     sees [facility]     Tinnitus        Past Surgical History:   Procedure Laterality Date     ACHILLES TENDON REPAIR       PROSTATECTOMY  2012     right inguinal hernia repair  1984         Current Outpatient  (breaths/min): 26, PEEP/CPAP: 10, FiO2: 30, P Peak (PIP): 25, P MEAN: 15 Respiration: 19, Pulse Oximetry: 96 %  Chest Tube Group 1 (A) Mediastinal ANgled 32-Tube Status / Drainage: Patent;Sutured in Place;Small;Serosanguinous, Chest Tube Group 2 (B) Mediastinal Straight 32-Tube Status / Drainage: Patent;Small;Serosanguinous, Chest Tube Group 1 (A) Mediastinal ANgled 32-Device: Dry Closed Drainage System;Suction 20 cm Water, Chest Tube Group 2 (B) Mediastinal Straight 32-Device: Dry Closed Drainage System;Suction 20 cm Water  Chest Tube Drains:  Francisco Javier Girard 1: 30 ml  Mediastinal Chest Tube 1: 100 ml    Fluids:    Intake/Output Summary (Last 24 hours) at 12/27/17 0852  Last data filed at 12/27/17 0800   Gross per 24 hour   Intake          5959.05 ml   Output             5205 ml   Net           754.05 ml     Admit weight: Weight: (!) 159.2 kg (350 lb 15.6 oz)  Current weight: Weight: (!) 159 kg (350 lb 8.5 oz) (12/27/17 0600)    Labs:  Recent Labs      12/25/17   0450  12/26/17   0520  12/27/17   0530   WBC  25.7*  26.8*  27.2*   RBC  4.36*  4.33*  3.76*   HEMOGLOBIN  10.4*  10.5*  9.2*   HEMATOCRIT  34.4*  34.3*  30.1*   MCV  78.9*  79.2*  80.1*   MCH  23.9*  24.2*  24.5*   MCHC  30.2*  30.6*  30.6*   RDW  59.4*  59.5*  59.6*   PLATELETCT  194  237  244   MPV  10.6  10.8  10.6         Recent Labs      12/25/17   0450   12/26/17   0520  12/26/17   1240  12/26/17   2205  12/27/17   0455   SODIUM  140   --   139   --    --   140   POTASSIUM  3.3*   < >  3.4*  3.5*  3.6  3.4*   CHLORIDE  112   --   112   --    --   112   CO2  22   --   21   --    --   22   GLUCOSE  146*   --   210*   --    --   140*   BUN  21   --   27*   --    --   31*   CREATININE  0.92   --   1.08   --    --   1.17   CALCIUM  8.0*   --   7.7*   --    --   7.5*    < > = values in this interval not displayed.           Medications:  • aspirin  81 mg     • potassium bicarbonate  50 mEq     • nystatin  5 mL     • K+ Scale: Goal of 4.5  1 Each     •  Medications:      aspirin 81 MG EC tablet, Take 81 mg by mouth daily., Disp: , Rfl:      atorvastatin (LIPITOR) 40 MG tablet, 40 mg bedtime. , Disp: , Rfl:      olmesartan-amLODIPin-hcthiazid 40-10-25 mg Tab, , Disp: , Rfl:      amLODIPine-valsartan-hcthiazid 5-160-12.5 mg Tab, Take 1 tablet by mouth daily. 40-10-25MG DAILY, Disp: , Rfl:     Allergies   Allergen Reactions     Perindopril Erbumine Unknown     Vardenafil Unknown       Social History     Socioeconomic History     Marital status:      Spouse name: Not on file     Number of children: Not on file     Years of education: Not on file     Highest education level: Not on file   Occupational History     Not on file   Social Needs     Financial resource strain: Not on file     Food insecurity     Worry: Not on file     Inability: Not on file     Transportation needs     Medical: Not on file     Non-medical: Not on file   Tobacco Use     Smoking status: Never Smoker     Smokeless tobacco: Never Used   Substance and Sexual Activity     Alcohol use: Yes     Comment: wine 2x/week     Drug use: No     Sexual activity: Not on file   Lifestyle     Physical activity     Days per week: Not on file     Minutes per session: Not on file     Stress: Not on file   Relationships     Social connections     Talks on phone: Not on file     Gets together: Not on file     Attends Restorationism service: Not on file     Active member of club or organization: Not on file     Attends meetings of clubs or organizations: Not on file     Relationship status: Not on file     Intimate partner violence     Fear of current or ex partner: Not on file     Emotionally abused: Not on file     Physically abused: Not on file     Forced sexual activity: Not on file   Other Topics Concern     Not on file   Social History Narrative    The patient is employed.        Family History   Problem Relation Age of Onset     Hyperlipidemia Mother      Heart disease Mother      Cancer Mother      Heart  disease Father      Coronary artery disease Brother         PTCA with stenting     Coronary artery disease Brother         PTCA with stenting     Coronary artery disease Sister         PTCA with stenting       Review of Systems:  Review of systems is otherwise negative, except as noted above.  Full 12 point review of systems was completed.    Radiology/Diagnostic Studies:    I personally reviewed the following imaging results today and those on care everywhere, if indicated    EXAM: US VENOUS LOWER EXTREMITY RIGHT  LOCATION: [facility] [place]  DATE/TIME: 11/6/2019 8:44 PM    INDICATION: Right calf pain.  COMPARISON: None.  TECHNIQUE: Venous Duplex ultrasound of the right lower extremity with and  without compression, augmentation and duplex. Color flow and spectral Doppler  with waveform analysis performed.    FINDINGS: Exam includes the common femoral, femoral, popliteal, and  contralateral common femoral veins as well as segmentally visualized deep calf  veins and greater saphenous vein.     RIGHT: No deep vein thrombosis. No superficial thrombophlebitis. No popliteal  cyst.  There is subcutaneous edema about the lower calf and right ankle.    IMPRESSION:  1.  No deep venous thrombosis in the right lower extremity.    Laboratory Studies:  I personally reviewed the following lab results today and those on care everywhere, if indicated      No results found for: SEDRATE      No results found for: CRP        Lab Results   Component Value Date    CREATININE 1.00 06/29/2020      No results found for: HGBA1C        Lab Results   Component Value Date    BUN 20 06/29/2020              Lab Results   Component Value Date    ALBUMIN 3.9 06/29/2020       No results found for: AEPHYJMY10MD    Lab Results   Component Value Date    TSH 1.08 03/25/2016     Lab Results   Component Value Date    WBC 9.1 09/10/2014    HGB 15.0 09/10/2014    HCT 45.0 09/10/2014    MCV 93 09/10/2014     09/10/2014       Physical  insulin NPH  5 Units     • famotidine  20 mg      Or   • famotidine  20 mg     • chlorhexidine  15 mL     • insulin lispro  0-15 Units     • docusate sodium  100 mg      And   • senna-docusate  1 Tab     • enoxaparin  40 mg     • clopidogrel  75 mg     • carvedilol  6.25 mg     • lisinopril  5 mg     • rosuvastatin  20 mg         Exam:   Review of Systems   Unable to perform ROS: Intubated       Physical Exam   Constitutional: No distress. He is intubated.   Neck: Neck supple.   Cardiovascular: Normal rate and regular rhythm.  Exam reveals friction rub. Exam reveals no gallop.    No murmur heard.  Pulmonary/Chest: Effort normal. He is intubated. No respiratory distress. He has decreased breath sounds in the right lower field and the left lower field.   Abdominal: Soft.   Musculoskeletal: He exhibits edema.   Neurological:   sedated   Skin: Skin is warm.       Quality Measures:     Reviewed items::  EKG reviewed, Labs reviewed, Medications reviewed and Radiology images reviewed  Jenkins catheter::  One or Two Days Post Surgery (Day of Surgery being Day 0) and Critically Ill - Requiring Accurate Measurement of Urinary Output  Central line in place:  Need for access and Vasopressors  DVT prophylaxis pharmacological::  Contraindicated - High bleeding risk  DVT prophylaxis - mechanical:  SCDs  Ulcer Prophylaxis::  Yes      Assessment/Plan:  POD 1 - HOTN- IABP 1:2, epi/jazz, vent- peep inc this am, keep CT/jenkins, CPM  POD 2 HOTN - epi/jazz- IABP 1:2, Vent - pulm following, s/p PEA arrest yesterday- bronch with lots of thick secretions, sedated, CPM  POD 3 - HOTN - epi/jazz- decreased from yesterday, IABP 1:2, Vent - CMV 26, PEEP 10, sedated, diurese well yesterday- start lasix gtt today, CPM  POD 4 NSR, Hotn-epi/jazz, down slightly form yesterday.  IABP 1:2 no change in pressures with pump on standby--change to 1:4.  On lasix gtt, diuresing well.  FiO2 down to 40%, PEEP 10.  Continue lasix gtt.  Will plan on removing IABP  Exam:  Vitals:    06/30/20 0915   BP: 110/70   Pulse: 62   Resp: 16   Temp: 98.2  F (36.8  C)   SpO2: 97%     BMI 27.16   Weight 189 pounds     See flow chart for circumferential measures.    Vasc Edema 6/30/2020   Right just above MTP 24   Right Ankle 25.7   Right Widest Calf 34.2   Right Thigh Up 10cm 43   Left - just above MTP 23.5   Left Ankle 22.5   Left Widest Calf 33.5   Left Thigh Up 10cm 41        General:  69 y.o. male in no apparent distress.      Psych: Alert and oriented x 3.  Cooperative. Affect normal.  Very pleasant.    HEENT: Atraumatic, normocephalic    Respiratory:  Lungs are clear to auscultation throughout with full inspiration    Cardiovascular: Normal S1, S2 without murmur, gallop or rub.  No audible carotid bruits. Regular rhythm.     Abdominal: Normal bowel sounds without any pain, guarding or rigidity. Right inguinal mild lymphadenopathy palpated.    Musculoskeletal:  Normal range of motion in hips, knees and ankles bilaterally.  There is no active joint synovitis, erythema, swelling or joint laxity.     Neurological:  Sensation is intact to pin prick and light touch in both legs.  Strength testing is normal in hip flexion, knee flexion, knee extension, ankle dorsiflexion and great toe extension bilaterally. Deep tendon reflexes knee jerks and ankle jerks are normal bilaterally.      Vascular: Dorsalis pedis and posterior tibialis pulses are strong and equal bilaterally and reveal audible biphasic waves with a hand held doppler bilaterally. There are no significant telangietasias, medial ankle venous flares and spider veins .     Integumentary: Skin of the legs is uniformly warm and dry, and hyperpigmentated and with positive Stemmer's Sign very slight on right..  Nails are thickened and discolored      Impression:    1. Right leg swelling  2. Acquired lymphedema  3. Fibrosis and scarring  4. Right inguinal lymphadenopathy  5. High arches  6. Prostate CA (2012  Prostatectomy with  today. CPM.   POD 5 HDS, SB- d/c amio, epi/jazz- weaning, Vent- per pulm, sedated when awake follows per RN, lasix gtt- cont, CPM    Patient seen, examined and plan reviewed with midlevel provider. I agree with the plan.      Active Hospital Problems    Diagnosis   • CAD (coronary artery disease) [I25.10]   • CHF (congestive heart failure) (CMS-[facility]) [I50.9]        LND)    Plan:  1. Type of swelling was reviewed in detail with the patient.  Questions were answered and education was completed.  2. CT abd/pelvis.  Suspect right inguinal adenopathy due to infection history, but with prostate ca history will check for proximal obstruction.  3. Venous insufficiency study to check vein status.  4. If all okay with imaging then to therapy and then to get compression.  Will also give info on OTC compression to be measured once swelling down.  5. OTC good insole information given to protect feet, ankles and skeletal system.    6. Patient will follow up in 3 months, or when needed.  If any questions or concerns they are to contact the clinic.     Thank you very much for referring Satish JIMENEZ Precious.  If you have any questions please feel free to contact me at [phone].    Time spent with patient 60 minutes with greater than 50% time in consultation, education and coordination of care, excluding procedures.     Suzanne Zelaya MD, Diplomate ABWMS, FACCWS, FAAPMR  Medical Director Wound Care and Lymphedema  [facility]  [phone]    This note was dictated using a voice recognition software.  Any grammatical or context distortion are unintentional and inherent to the software.

## 2021-07-12 ENCOUNTER — HOSPITAL ENCOUNTER (EMERGENCY)
Dept: HOSPITAL 94 - ER | Age: 56
LOS: 1 days | Discharge: HOME | End: 2021-07-13
Payer: MEDICARE

## 2021-07-12 VITALS — HEIGHT: 77 IN | WEIGHT: 315 LBS | BODY MASS INDEX: 37.19 KG/M2

## 2021-07-12 DIAGNOSIS — I50.9: ICD-10-CM

## 2021-07-12 DIAGNOSIS — I48.91: Primary | ICD-10-CM

## 2021-07-12 DIAGNOSIS — Z88.1: ICD-10-CM

## 2021-07-12 DIAGNOSIS — E78.00: ICD-10-CM

## 2021-07-12 DIAGNOSIS — J44.9: ICD-10-CM

## 2021-07-12 DIAGNOSIS — Z79.899: ICD-10-CM

## 2021-07-12 DIAGNOSIS — Z91.013: ICD-10-CM

## 2021-07-12 DIAGNOSIS — Z91.041: ICD-10-CM

## 2021-07-12 DIAGNOSIS — I11.0: ICD-10-CM

## 2021-07-12 PROCEDURE — 96366 THER/PROPH/DIAG IV INF ADDON: CPT

## 2021-07-12 PROCEDURE — 96375 TX/PRO/DX INJ NEW DRUG ADDON: CPT

## 2021-07-12 PROCEDURE — 99285 EMERGENCY DEPT VISIT HI MDM: CPT

## 2021-07-12 PROCEDURE — 96365 THER/PROPH/DIAG IV INF INIT: CPT

## 2021-07-13 ENCOUNTER — HOSPITAL ENCOUNTER (EMERGENCY)
Dept: HOSPITAL 94 - ER | Age: 56
Discharge: HOME | End: 2021-07-13
Payer: MEDICARE

## 2021-07-13 VITALS — SYSTOLIC BLOOD PRESSURE: 96 MMHG | DIASTOLIC BLOOD PRESSURE: 60 MMHG

## 2021-07-13 VITALS — HEIGHT: 77 IN | BODY MASS INDEX: 37.19 KG/M2 | WEIGHT: 315 LBS

## 2021-07-13 VITALS — DIASTOLIC BLOOD PRESSURE: 72 MMHG | SYSTOLIC BLOOD PRESSURE: 137 MMHG

## 2021-07-13 DIAGNOSIS — Z88.8: ICD-10-CM

## 2021-07-13 DIAGNOSIS — Z79.899: ICD-10-CM

## 2021-07-13 DIAGNOSIS — E78.00: ICD-10-CM

## 2021-07-13 DIAGNOSIS — I50.9: ICD-10-CM

## 2021-07-13 DIAGNOSIS — Z91.013: ICD-10-CM

## 2021-07-13 DIAGNOSIS — Z91.040: ICD-10-CM

## 2021-07-13 DIAGNOSIS — I11.0: ICD-10-CM

## 2021-07-13 DIAGNOSIS — R33.9: Primary | ICD-10-CM

## 2021-07-13 DIAGNOSIS — Z88.1: ICD-10-CM

## 2021-07-13 DIAGNOSIS — J44.9: ICD-10-CM

## 2021-07-13 DIAGNOSIS — I48.91: ICD-10-CM

## 2021-07-13 PROCEDURE — 99285 EMERGENCY DEPT VISIT HI MDM: CPT

## 2021-07-13 PROCEDURE — 94760 N-INVAS EAR/PLS OXIMETRY 1: CPT

## 2021-07-13 PROCEDURE — 96366 THER/PROPH/DIAG IV INF ADDON: CPT

## 2021-07-13 PROCEDURE — 96365 THER/PROPH/DIAG IV INF INIT: CPT

## 2021-07-13 PROCEDURE — 96375 TX/PRO/DX INJ NEW DRUG ADDON: CPT

## 2021-07-13 PROCEDURE — 51702 INSERT TEMP BLADDER CATH: CPT

## 2021-07-13 PROCEDURE — 94660 CPAP INITIATION&MGMT: CPT

## 2021-07-13 PROCEDURE — 76857 US EXAM PELVIC LIMITED: CPT

## 2021-07-13 NOTE — NUR
DR AGUILAR AT BEDSIDE, PLACED INDWELLING MELENDREZ, PT TOLERATED WELL. Patient Seen in: [facility] In [address] [place] [address]    History   Patient presents with:  Abscess (integumentary)    Stated Complaint: cyst    HPI  Patient complains of skin infection for  2 weeks. Located R buttock. Describes as painful.   No SpO2 99%   BMI 18.60 kg/m²       GENERAL: NAD  HEENT: EOMI, MMM no lesions  EXTREMITIES: no edema  NEURO: alert, oriented x 3, 2-12 intact, no focal deficits appreciated  SKIN:  R buttock with 2x1cm erythematous tender minimally fluctuant lesion extending

## 2021-07-13 NOTE — NUR
CPAP CHECK AND VITALS DONE FROM THE DOOR. PT GETTING NEW MELENDREZ PUT IN BY UROLOGIST. PT IN NO 
DISTRESS 

-------------------------------------------------------------------------------

Addendum: 07/13/21 at 1149 by Brittney Ma RT

-------------------------------------------------------------------------------

Amended: Links added.

## 2021-09-15 ENCOUNTER — ANTICOAGULATION MONITORING (OUTPATIENT)
Dept: VASCULAR LAB | Facility: MEDICAL CENTER | Age: 56
End: 2021-09-15

## 2021-09-15 ENCOUNTER — TELEPHONE (OUTPATIENT)
Dept: VASCULAR LAB | Facility: MEDICAL CENTER | Age: 56
End: 2021-09-15

## 2021-09-15 NOTE — TELEPHONE ENCOUNTER
Received notification from [employer].  Pt is .    Briana Roberson, Clinical Pharmacist, CDE, CACP

## 2022-01-13 NOTE — CARE PLAN
Problem: Nutritional:  Goal: Nutrition support tolerated and meeting greater than 85% of estimated needs  Outcome: MET Date Met: 01/02/18         unknown
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
## 2022-07-01 NOTE — PROGRESS NOTES
EKG revealed atrial fibrillation, Dr. Banegas notified and orders received for amiodarone protocol.  Discussed findings with wife, she agreed to increase propofol drip to try and let pt relax and decrease agitation.   0

## 2022-08-13 NOTE — CONSULTS
DATE OF SERVICE:  07/24/2018    CARDIOLOGY CONSULTATION    REQUESTING PHYSICIAN:  Lisbeth Reynoso MD    PATIENT IDENTIFICATION:  The patient is a 52-year-old male with a history of   coronary artery disease and ischemic cardiomyopathy.  Patient is being   admitted with a lower GI bleed.  Cardiology consultation was requested because   of hypotension and bradydysrhythmia.    HISTORY OF PRESENT ILLNESS:  Patient has a complicated cardiac history.  He   has a history of coronary artery bypass graft surgery x2 in 12/2017.  At that   time, he received a LIMA to the LAD and a saphenous vein graft to the marginal   branch of the circumflex.  His left ventricular ejection fraction is   approximately 25%.  He has had difficulty with recurrent episodes of atrial   fibrillation and has been on chronic anticoagulation therapy with warfarin.    Today, the patient noted bright red blood per rectum.  He denied any pain.  He   was lightheaded and paramedics were called.  His initial blood pressure was   approximately 70/40.  Patient received IV fluid and was transferred.  He has   received an IV fluid bolus in the emergency room and is being transfused at   the present time.  He is being maintained on Levophed and dopamine drips.    This gives him an adequate blood pressure.    He was noted to become bradycardic which resolved.  He had   second-degree AV block.  This is a 2:1 block and is unable to be determined   whether or not it is Mobitz I or Mobitz II.  However, given his acute   situation and probable vagal reaction, I suspect it is Mobitz I.    Patient has actually been doing reasonably well clinically from a   cardiovascular standpoint.  He denies any chest discomfort, dyspnea on   exertion, PND or orthopnea.  He has noted no edema or palpitations.  He did   have difficulty with lightheadedness today.    PAST MEDICAL HISTORY AND MEDICAL ILLNESSES:  Remarkable for coronary artery   disease, COPD, paroxysmal atrial fibrillation  "and possibly asthma.  His chart   notes diabetes, but the patient denies this.    PAST SURGICAL HISTORY:  Patient has had pleurodesis post his bypass graft   surgery.  He has coronary artery bypass graft surgery as noted above.    ALLERGIES:  ERYTHROMYCIN, \"CILLINS,\" METOPROLOL AND SHELLFISH.    MEDICATIONS:  Prior to admission, albuterol, amiodarone 200 mg daily, aspirin   81 mg daily, Symbicort, carvedilol 6.25 mg b.i.d., digoxin 125 mcg daily,   furosemide 20 mg daily, DuoNeb, lisinopril 10 mg daily, rosuvastatin 40 mg   daily, spironolactone 50 mg daily and warfarin 5 mg as directed.    SOCIAL HISTORY:  Patient is  and has 2 children.  He smokes about 5   cigarettes a week.  He does not chew and denies any alcohol use.    FAMILY HISTORY:  Patient's father had a myocardial infarction around the age   of 66.    REVIEW OF SYSTEMS:  CONSTITUTIONAL, EYES, ENT AND RESPIRATORY:  Noncontributory.  CARDIOVASCULAR:  As above.  GASTROINTESTINAL:  As noted above.  GENITOURINARY AND ORTHOPEDIC:  Negative.  NEUROLOGIC:  Patient had some numbness in his right lower extremity for a few   seconds while sitting on the commode this morning.  PSYCHIATRIC:  Negative.  HEMATOLOGIC:  Negative.  Patient denies any history of anemia or prior   bleeding.  ENDOCRINE:  Patient denies any history of diabetes, polydipsia or glucose   intolerance in the past, though he does have a diagnosis of diabetes on his   chart.  SKIN:  Remarkable for complaints of stasis dermatitis on his lower   extremities.  ALLERGIC AND IMMUNOLOGIC:  PATIENT IS ALLERGIC TO SHELLFISH as noted   previously.    PHYSICAL EXAMINATION:  VITAL SIGNS:  Blood pressure 150/56, pulse of 70, O2 sat on room air is 100%,   height 6 feet 5 inches, weight 208 pounds.  GENERAL APPEARANCE:  Alert, well-developed, obese, middle-aged male in no   acute distress at the present time.  He is still having frequent stools.  EYES:  PERRLA, EOMI.  Lids and conjunctivae are " clear.  MOUTH:  Good oral hygiene.  Mucosa is normal.  NECK:  No thyromegaly or cervical adenopathy is noted.  Jugular venous   distention is difficult to evaluate since the patient is supine and has an   obese neck.  CHEST:  Lungs are clear to auscultation and percussion bilaterally.  CARDIAC:  There is a regular rate and rhythm.  S1, S2 are grossly normal.    There is no S3 or S4 appreciated.  Grade II/VI systolic murmur is noted at the   base.  No diastolic murmurs, clicks or rubs noted.  ABDOMEN:  Soft, obese, nontender, no hepatosplenomegaly noted.  MUSCULOSKELETAL:  Patient has no significant kyphoscoliosis.  Muscle strength   and tone are normal.  EXTREMITIES:  No clubbing, cyanosis or edema is present.  Patient does have   stasis changes below his knees bilaterally.  SKIN:  Inspection and palpation noncontributory.  NEUROLOGIC:  No focal neurologic abnormalities noted.  PSYCHIATRIC:  Patient is alert and oriented x3.  Mood and affect are   appropriate.    LABORATORY DATA:  Hemoglobin 6.7, white blood cell count 20,000.  Sodium 138,   potassium 4.6, BUN 22, creatinine 1.38.  Iron saturation is reduced at 4%.    INR was 2.05; however, INR earlier today was 3.4.    IMAGING:  Echocardiogram from 01/02 showed an ejection fraction of   approximately 30%.  EKG, patient has no EKG from today.  His EKG from   03/23/2018 showed a normal sinus rhythm with first-degree AV block, left axis   deviation and poor anterior R-wave progression with late transition.  A   nonspecific repolarization abnormality was also present.  Rhythm strips from   today, patient's rhythm strips showed a bradycardic rhythm with a narrow   complex.  There were dropped beats on every other beat.  It is impossible to   tell whether or not this is Mobitz I or Mobitz II because of the bigeminal   dropped beats.    IMPRESSION:  1.  Lower gastrointestinal bleed.  Patient will need clearance by   gastrointestinal before he is considered for recurrent  use of anticoagulation.    However, at the present time, he is maintaining a sinus rhythm, which is   reassuring.  2.  Paroxysmal atrial fibrillation.  Again, the patient is in sinus rhythm at   the present time.  3.  History of coronary artery disease with ischemic cardiomyopathy.  4.  Status post bypass graft surgery with a complicated postoperative course   in 12/2017.  5.  Dyslipidemia.  6.  American Heart Association stage C congestive heart failure.  7.  New York Heart Association class II.    RECOMMENDATIONS:  1.  Wean pressor support as tolerated.  2.  Continue fluid resuscitation and transfusions as necessary for now.  3.  We will follow closely for congestive heart failure.  4.  At the present time, it does not appear that patient needs a temporary   pacemaker.  5.  Repeat echocardiogram once patient is off pressor therapy.       ____________________________________     MD PAIGE TAVERA / YOSVANY    DD:  07/24/2018 23:06:53  DT:  07/25/2018 00:17:04    D#:  [id number]  Job#:  [id number]     electronic

## 2022-12-20 NOTE — ASSESSMENT & PLAN NOTE
- COPD exacerbation on admission; now resolved  - PFTS from 2018 revealed FVC 37% predicted.  FEV1 35% predicted.  No   significant response to bronchodilators.  FEV1/FVC is 73. Total lung capacity 5.29,     Diffusion DLCO 41% predicted indicating mixed obstructive and restrictive disease  -Oxygen per protocol  -RT protocol with inhalers   Detail Level: Detailed

## 2023-01-12 NOTE — PROGRESS NOTES
24 hr chart review     Sarecycline Pregnancy And Lactation Text: This medication is Pregnancy Category D and not consider safe during pregnancy. It is also excreted in breast milk.

## 2023-06-13 NOTE — DISCHARGE SUMMARY
Internal Medicine Discharge Summary  Note Author: Romario Dave M.D.       Name Joshua Medrano     1965   Age/Sex 52 y.o. male   MRN [id number]         Admit Date:  10/27/2018       Discharge Date:   10/31/2018    Service:   [facility] Internal Medicine Purple team  Attending Physician(s):   Dr. Gilmore       Senior Resident(s):   Dr. Weber  Julius Resident(s):   Dr. Dave  PCP: Nam Le M.D.      Primary Diagnosis:   Acute on chronic respiratory failure secondary to COPD exacerbation    Secondary Diagnoses:                Principal Problem:    Acute on chronic respiratory failure (HCC) POA: Yes  Active Problems:    Chronic obstructive pulmonary disease with acute exacerbation (HCC) POA: Yes    Heart failure with preserved ejection fraction, borderline, class IV (HCC) POA: Yes    Closed fracture of multiple ribs of right side POA: Yes    Pneumothorax POA: Yes      Overview:           Acute exacerbation of CHF (congestive heart failure) ([facility]) POA: Yes    CAD (coronary artery disease) POA: Yes    SHASHANK (obstructive sleep apnea) POA: Yes    Microcytic anemia POA: Yes    Morbid obesity with BMI of 40.0-44.9, adult ([facility]) POA: Yes    Type 2 diabetes mellitus with complication, without long-term current use of insulin ([facility]) POA: Yes    Paroxysmal atrial fibrillation (HCC) POA: Yes    Cigarette nicotine dependence with nicotine-induced disorder POA: Yes    CKD (chronic kidney disease), stage III ([facility]) POA: Yes    Dyslipidemia POA: Yes    Forearm abrasion, right, initial encounter POA: Yes  Resolved Problems:    * No resolved hospital problems. *      Hospital Summary (Brief Narrative):       Patient is a 53-year-old morbidly obese male with past medical history of COPD on home 2 L O2, HFpEF of 55%, microcytic anemia with hemoglobin around 7, SHASHANK, paroxysmal A. fib, dyslipidemia, and type II DM with hemoglobin A1c of 5.9.  During transport to the ED, patient unfortunately fell along with EMS gurney causing  rib fractures from 5 through 8 and apical pneumothorax.  Trauma surgery was consulted.  Pain was controlled with tramadol and as needed morphine.  Patient was treated with RT per protocol, incentive spirometry and serial chest x-rays.  Pneumothorax continued to improve. Patient was able to pull up to 2000 on IS. Continue IS at home with 5 days of Tramadol for Pain. At discharge pt reports 0/10 on pain scale. Along with rib fracture patient also presented with COPD exacerbation needing 4 L of oxygen in the ED.  Patient continued to improve with IV Medrol, IV Lasix, respiratory therapy per protocol and O2.  Patient continued to show improvement in shortness of breath.  He was later switched to oral prednisone, oral Lasix and oral potassium. Patient was also diagnosed with paroxysmal A. fib, however, anticoagulation was held due to GI bleed in recent months.  As per wife, rivaroxaban was not an option due to financial reasons.  Patient was started on warfarin with INR goal of 2-3.  Patient discharged with 5 mg/day of warfarin with anticoagulation clinic appointment on 11/5.      Patient's HFpEF of 55% likely contributed to COPD exacerbation.  Patient's BNP tented up for couple days then came back down with gentle diuresis with Lasix.  Patient also reported cough with yellow/green sputum.  Blood and sputum cultures did not show any growth as of today.  Patient was started on IV antibiotics later switched to oral Cefdinir at the time of discharge for 5 days. Stage 3 CKD remained relatively stable.     Continue home amiodarone, metoprolol, spironolactone, Lisinopril for CAD, A-fib, HTN and CHF.    Patient reports this episode of COPD exacerbation happened due to running out of inhalers.  D/C'd patient with DuoNeb, Spiriva, as needed albuterol, oral prednisone, cefdinir, Lasix and potassium.  Advised and educated patient on importance of getting CBC and CMP 7 days from today and follow-up with PCP for results.  Patient's  hemoglobin tends to be around 7.  Patient was educated on bruising/bleeding risk from warfarin.  Please follow-up with PCP.  Patient has an appointment with PCP on 11/15, anticoagulation appointment on 11/5, and heart failure clinic appointment on 11/13.     Patient is an morbidly obese male with Mallampati score of 3-4.  Please follow-up with PCP for SHASHANK. Consider sleep study.    Patient is a current smoker with 1-2 cigarettes/day with 30 pack smoking history.  He reports smoking cessation from here on.  Please follow-up with PCP for additional help with smoking cessation.      Consultants:     Trauma surgery    Procedures:        None    Imaging/ Testing:      DX-CHEST-PORTABLE (1 VIEW)   Final Result         1.  Pulmonary edema and/or infiltrates are identified, which are stable since the prior exam.   2.  Cardiomegaly         DX-CHEST-PORTABLE (1 VIEW)   Final Result         1.  Pulmonary edema and/or infiltrates are identified, which appear somewhat increased since the prior exam.   2.  Cardiomegaly      DX-CHEST-PORTABLE (1 VIEW)   Final Result         1.  Pulmonary edema and/or infiltrates are identified, which are stable since the prior exam.   2.  Cardiomegaly      DX-CHEST-PORTABLE (1 VIEW)   Final Result      Grossly stable chest appearance compared with 10/27.      DX-CHEST-LIMITED (1 VIEW)   Final Result      Stable chest appearance compared with prior image today.               INTERPRETING LOCATION: [address] [place] [address], [facility]      DX-CHEST-PORTABLE (1 VIEW)   Final Result      No significant interval change.      DX-ELBOW-LIMITED 2- RIGHT   Final Result      Marked soft tissue swelling along the posteromedial elbow/proximal forearm. No elbow effusion. No acute osseous abnormality.      DX-CHEST-PORTABLE (1 VIEW)   Final Result         1. Small right apical pneumothorax.   2. Cardiomegaly with worsening interstitial and alveolar edema.      Case was discussed with Taylor Nicole at approximately 9:40 AM  on 10/27/2018.      Case was discussed with      DX-FOOT-2- RIGHT   Final Result      No acute bony abnormality.      DX-SHOULDER 2+ RIGHT   Final Result      Right seventh rib fracture.      FR-GOEM-NNOLCKTHWU (WITH 1-VIEW CXR) RIGHT   Final Result      Right-sided rib fractures as described above.            Discharge Medications:         Medication Reconciliation: Completed       Medication List      START taking these medications      Instructions   acetaminophen 500 MG Tabs  Commonly known as:  TYLENOL   Take 2 Tabs by mouth every 8 hours.  Dose:  1000 mg     calcium carbonate 500 MG Chew  Commonly known as:  TUMS   Take 1 Tab by mouth 2 times a day as needed (Reflux).  Dose:  500 mg     cefdinir 300 MG Caps  Commonly known as:  OMNICEF   Take 1 Cap by mouth 2 times a day for 5 days.  Dose:  300 mg     ferrous sulfate 325 (65 Fe) MG tablet   Take 1 Tab by mouth every morning with breakfast.  Dose:  325 mg     guaiFENesin  MG Tb12  Commonly known as:  MUCINEX   Take 1 Tab by mouth every 12 hours.  Dose:  600 mg     * potassium chloride SA 20 MEQ Tbcr  Commonly known as:  Kdur   Take 1 Tab by mouth every day for 7 days.  Dose:  20 mEq     * potassium chloride SA 10 MEQ Tbcr  Commonly known as:  K-DUR   Take 1 Tab by mouth every day. Please take after 7 days with Lasix daily  Dose:  10 mEq     predniSONE 10 MG Tabs  Commonly known as:  DELTASONE   Take 1 Tab by mouth every day for 12 days. Take 60mg x 2 days, 50mg x2 days, 40mg x2 days, 30 mg x2 days, 20mg x2 days, 10mg x2 days  Dose:  10 mg     tiotropium 18 MCG Caps  Commonly known as:  SPIRIVA   Inhale 1 Cap by mouth every day.  Dose:  18 mcg     tramadol 50 MG Tabs  Commonly known as:  ULTRAM   Take 1 Tab by mouth every 6 hours as needed for Severe Pain (Ribs pain) for up to 5 days.  Dose:  50 mg     warfarin 5 MG Tabs  Commonly known as:  COUMADIN   Take 1 Tab by mouth COUMADIN-DAILY.  Dose:  5 mg        * This list has 2 medication(s) that are the same  as other medications prescribed for you. Read the directions carefully, and ask your doctor or other care provider to review them with you.            CHANGE how you take these medications      Instructions   * furosemide 20 MG Tabs  What changed:  · how much to take  · when to take this  Commonly known as:  LASIX   Take 2 Tabs by mouth 2 times a day for 7 days.  Dose:  40 mg     * furosemide 40 MG Tabs  What changed:  You were already taking a medication with the same name, and this prescription was added. Make sure you understand how and when to take each.  Commonly known as:  LASIX   Take 1 Tab by mouth every day. Start after 7 days, 40 BID for 7 days  Dose:  40 mg        * This list has 2 medication(s) that are the same as other medications prescribed for you. Read the directions carefully, and ask your doctor or other care provider to review them with you.            CONTINUE taking these medications      Instructions   albuterol 108 (90 Base) MCG/ACT Aers inhalation aerosol   Inhale 2 Puffs by mouth every 6 hours as needed for Shortness of Breath.  Dose:  2 Puff     amiodarone 200 MG Tabs  Commonly known as:  CORDARONE   Take 1 Tab by mouth every day.  Dose:  200 mg     budesonide-formoterol 160-4.5 MCG/ACT Aero  Commonly known as:  SYMBICORT   Inhale 2 Puffs by mouth 2 Times a Day.  Dose:  2 Puff     CRESTOR 40 MG tablet  Generic drug:  rosuvastatin   Take 40 mg by mouth every day.  Dose:  40 mg     ipratropium-albuterol 0.5-2.5 (3) MG/3ML nebulizer solution  Commonly known as:  DUONEB   3 mL by Nebulization route every 6 hours as needed for Shortness of Breath.  Dose:  3 mL     lisinopril 2.5 MG Tabs  Commonly known as:  PRINIVIL   Take 1 Tab by mouth every day.  Dose:  2.5 mg     metoprolol SR 25 MG Tb24  Commonly known as:  TOPROL XL   Take 1 Tab by mouth every day.  Dose:  25 mg     spironolactone 25 MG Tabs  Commonly known as:  ALDACTONE   Take 1 Tab by mouth every day.  Dose:  25 mg           Disposition:   Home    Diet: Cardiac    Activity:   As tolerated  Instructions:      Please take medications as prescribed  Please follow-up with appointments made  Please return to ED if shortness of breath worsens, melena, hematochezia, hemoptysis.  The patient was instructed to return to the ER in the event of worsening symptoms. I have counseled the patient on the importance of compliance and the patient has agreed to proceed with all medical recommendations and follow up plan indicated above.   The patient understands that all medications come with benefits and risks. Risks may include permanent injury or death and these risks can be minimized with close reassessment and monitoring.        Primary Care Provider:    Nam Le  Discharge summary faxed to primary care provider:  Deferred  Copy of discharge summary given to the patient: Deferred      Follow up appointment details :      PCP, Carmen Le on 11/15  Anticoagulation clinic on 11/5  Heart failure clinic on 11/13    Pending Studies:        None    Time spent on discharge day patient visit, preparing discharge paperwork and arranging for patient follow up.    Summary of follow up issues:   Potassium on CMP  Drop in hemoglobin  Obstructive sleep apnea    Discharge Time (Minutes) :    45 minutes  Hospital Course Type:  Inpatient Stay >2 midnights      Condition on Discharge    ______________________________________________________________________    Interval history/exam for day of discharge:    See progress note      Most Recent Labs:    Lab Results   Component Value Date/Time    WBC 24.5 (H) 10/30/2018 07:46 AM    RBC 4.00 (L) 10/30/2018 07:46 AM    HEMOGLOBIN 7.4 (L) 10/30/2018 07:46 AM    HEMATOCRIT 27.6 (L) 10/30/2018 07:46 AM    MCV 69.0 (L) 10/30/2018 07:46 AM    MCH 18.5 (L) 10/30/2018 07:46 AM    MCHC 26.8 (L) 10/30/2018 07:46 AM    MPV 10.3 10/30/2018 07:46 AM    NEUTSPOLYS 91.40 (H) 10/30/2018 07:46 AM    LYMPHOCYTES 1.50 (L) 10/30/2018 07:46  AM    MONOCYTES 4.00 10/30/2018 07:46 AM    EOSINOPHILS 0.00 10/30/2018 07:46 AM    EOSINOPHILS 1 01/03/2018 03:45 PM    BASOPHILS 0.20 10/30/2018 07:46 AM    HYPOCHROMIA 2+ 10/28/2018 12:39 AM    ANISOCYTOSIS 3+ 10/28/2018 12:39 AM      Lab Results   Component Value Date/Time    SODIUM 130 (L) 10/30/2018 07:46 AM    POTASSIUM 4.5 10/30/2018 07:46 AM    CHLORIDE 95 (L) 10/30/2018 07:46 AM    CO2 23 10/30/2018 07:46 AM    GLUCOSE 154 (H) 10/30/2018 07:46 AM    BUN 36 (H) 10/30/2018 07:46 AM    CREATININE 1.56 (H) 10/30/2018 07:46 AM      Lab Results   Component Value Date/Time    ALTSGPT 9 10/29/2018 01:52 AM    ASTSGOT 15 10/29/2018 01:52 AM    ALKPHOSPHAT 93 10/29/2018 01:52 AM    TBILIRUBIN 0.8 10/29/2018 01:52 AM    LIPASE 26 08/20/2018 10:00 PM    ALBUMIN 3.8 10/29/2018 01:52 AM    GLOBULIN 4.4 (H) 10/29/2018 01:52 AM    PREALBUMIN 10.0 (L) 01/29/2018 03:58 AM    INR 1.41 (H) 10/30/2018 07:46 AM    MACROCYTOSIS 1+ 10/27/2018 04:58 AM     Lab Results   Component Value Date/Time    PROTHROMBTM 17.3 (H) 10/30/2018 07:46 AM    INR 1.41 (H) 10/30/2018 07:46 AM         done

## 2024-02-09 NOTE — ED TRIAGE NOTES
Chief Complaint   Patient presents with   • Wound Check     Pt has bilateral lower leg wounds, pt stated pain has gotten worse in the past few days. BLE wrapped, visible wounds and swelling, pt taking antibiotics        Pt is alert and oriented, speaking in full sentences, follows commands and responds appropriately to questions. Resp are even and unlabored. No obvious acute distress.    Pt placed in lobby. Pt educated on triage process. Pt encouraged to alert staff for any changes.    Vitals:    10/29/20 1905   BP: 109/69   Pulse: 92   Resp: 18   Temp: 36.8 °C (98.2 °F)   SpO2: 93%        20

## 2024-04-19 NOTE — ASSESSMENT & PLAN NOTE
CAD s/p CABG  Continue crestor, ASA and other heart failure meds  
Continue home meds  Carvedilol, spironolactone, lasix, crestor, digoxin, lisinopril, lower dose than home of lisinopril  
LRI resulting in COPD exacerbation  Patient presenting with productive cough and SOB, requiring oxygen during the day as well (uses oxygen at night at baseline)  procalcitonin of 0.12  WBC 13.5  Influenza negative    Plan:  Continue home duonebs and albuterol   Discharged on doxycycline and prednisone  
Likely COPD exacerbation with LRI, possibly PNA  Patient presenting with productive cough and SOB, requiring oxygen during the day as well (uses oxygen at night at baseline)  procalcitonin of 0.12    Plan:  Continue home duonebs and albuterol   Discharged on doxycycline and prednisone  
Likely iron deficiency anemia with low serum iron, % saturation, and low normal ferritin  Patient refuses to get CONSTANZA and has never had a colonoscopy and does not want one  Patient has risk factors for malignancy, but he adamantly refuses to get a colonoscopy  Stool occult negative    Iron supplementation  
Likely paroxysmal, on exam at admission regular rhythm  A fib on anticoagulation - warfarin  Also on amiodarone and digoxin  INR 2.48  Plan:  Continue home warfarin, amiodarone and digoxin  
Mild, 132  Patient on multiple diuretics  
bnp 234  Echo: Left ventricle is severely dilated.  Left ventricular ejection fraction is visually estimated to be 25-30%.  Global hypokinesis with regional variation.  Aortic sclerosis without stenosis.  Mild aortic insufficiency.  Mild mitral regurgitation.    Plan:  Continue home meds  Carvedilol, spironolactone,lasix, crestor, digoxin, lisinopril, lower dose than home of lisinopril at 5mg - follow up with cardiology  
Yes

## 2024-08-11 NOTE — ASSESSMENT & PLAN NOTE
MD notified of patient refusing to follow MD orders for clear liquid diet.  Education was provided to patient about purpose of orders.  Patient stated he is ordering himself a pizza and will not be following those orders.   Pt is morbidly obese with Mallampatti score of 3-4  Pt reports orthopnea and PND(improving)   Pt O2 requirement have come down to 2L   Out pt Sleep Study  F/U with PCP for sleep study referral

## 2024-08-12 NOTE — PROGRESS NOTES
Inpatient Anticoagulation Service Note    Date: 2/6/2018  Reason for Anticoagulation: Atrial Fibrillation  Hemoglobin Value: (!) 7.3  Hematocrit Value: (!) 27.8  Lab Platelet Value: (!) 500  Target INR: 2.0 to 3.0  INR from last 7 days     Date/Time INR Value    02/06/18 1534 (!)  1.23        Dose from last 7 days     Date/Time Dose (mg)    02/06/18 2100  7.5        Average Dose (mg):  (Per med rec: warfarin 5 mg PO daily)  Significant Interactions: Amiodarone, Aspirin, Statin  Bridge Therapy: No (heparin 5000 subQ q8h)    Reversal Agent Administered: Not Applicable    A/P:   Patient on warfarin for h/o afib and recent RUE DVT (12/2017).  Sub-therapeutic INR upon admission with reported regimen of 5 mg PO daily.  Confirmed with MD that he wanted no bridge therapy but VTE prophlaxis with heparin until INR therapeutic.  Give warfarin 7.5 mg PO tonight.  INR tomorrow.     Education Material Provided?: No (chronic warfarin patient)  Pharmacist suggested discharge dosing: TBD, perhaps warfarin 5-7.5 mg PO daily. Recommend a follow-up PT/INR within 48-72 hours of discharge.    Beckie Pelletier, ZakD, BCPS     The patient is a 35y Female complaining of cough.

## 2024-08-15 NOTE — PROGRESS NOTES
Called patient regarding upcoming appointment for Chemotherapy, per Dr. Nathan, at the moment patient is on hold until finish treatment with prednisone, infusion for 08/16/2024 has been canceled, patient stated understanding.   Inpatient Anticoagulation Service Note    Date: 11/26/2020  Reason for Anticoagulation: Atrial Fibrillation  Target INR: 2.0 to 3.0    [id number] VASc Score: 6  HAS-BLED Score: 1   Hemoglobin Value: (!) 9  Hematocrit Value: (!) 31.3  Lab Platelet Value: 270    INR from last 7 days     Date/Time INR Value    11/26/20 1434  (!) 1.6    11/25/20 0558  (!) 1.84    11/25/20 0133  (!) 1.97    11/24/20 2331  (!) 2.01        Dose from last 7 days     Date/Time Dose (mg)    11/26/20 1524  10        Average Dose (mg): (home dose 7.5 mg Field/Tu/Th and 5 mg all other days)    Significant Interactions: Amiodarone, Antibiotics, Other (Comments), Statin(Seroquel)    Bridge Therapy: Yes  Days of Overlap Therapy: 0    Reversal Agent Administered: Not Applicable    Comments: No bleeding or embolic complications noted.  Drug interactions with Amiodarone and Statin but pt is stable on combination at home; drug interaction with Ancef, will monitor effect on INR.  Pt is followed by the [facility] clinic and last seen on 9/2/20.  There is documentation from the [facility] pharmacist every week with attempts to contact the pt but he has not been seen since September.  Home dose is 7.5 mg on Field/Tu/Th and 5 mg all other days.  Last dose is unknown; pt has been admitted since 11/24 so last dose could have been that day prior to admission or earlier.  INR subtherapeutic.      Plan: Will order 10 mg x 1 tonight and reassess INR tomorrow.  Pharmacy will monitor and adjust as needed.    Education Material Provided?: No(chronic coumadin therapy)    Pharmacist suggested discharge dosing: possibly resume home dose if INR stabilizes on that dose       Sabrina Patel, PharmD

## 2024-09-09 NOTE — PROGRESS NOTES
Pulmonary Critical Care Progress Note      Date of service:  1/28/2018    Chief Complaint:  Respiratory failure    Interval Events:  24 hour interval history reviewed  Reason for visit:  Respiratory failure, pleural effusion       - SR - atrial flutter   - TF at 73   - CXR with less edema      Complains of sharp pain in the left chest from his surgery.  It is worse with coughing and deep breathing and better with rest and pain meds.  He has a cough with sputum production which is about the same.  He has unchanged SOB.  No abd pain, N or V.      Review of Systems   Constitutional: Negative for chills, diaphoresis and fever.   HENT: Negative for congestion, ear discharge, ear pain, nosebleeds, sinus pain, sore throat and tinnitus.    Eyes: Negative for blurred vision, double vision, photophobia, pain, discharge and redness.   Respiratory: Positive for cough, sputum production and shortness of breath. Negative for hemoptysis, wheezing and stridor.    Cardiovascular: Positive for chest pain (left lateral chest). Negative for palpitations, orthopnea and PND.   Gastrointestinal: Negative for abdominal pain, blood in stool, constipation, diarrhea, melena, nausea and vomiting.   Genitourinary: Negative for dysuria, flank pain, frequency, hematuria and urgency.   Musculoskeletal: Negative for back pain, falls, joint pain, myalgias and neck pain.   Skin: Negative for itching and rash.   Neurological: Negative for tingling, tremors, sensory change, speech change, focal weakness, seizures, loss of consciousness and headaches.   Endo/Heme/Allergies: Negative for environmental allergies and polydipsia. Does not bruise/bleed easily.   Psychiatric/Behavioral: Negative for depression, hallucinations and suicidal ideas. The patient is not nervous/anxious.        Physical Exam   Constitutional: No distress.   HENT:   Head: Normocephalic and atraumatic.   Right Ear: External ear normal.   Left Ear: External ear normal.   Nose: Nose  normal.   Mouth/Throat: Oropharynx is clear and moist. No oropharyngeal exudate.   Eyes: Conjunctivae are normal. Pupils are equal, round, and reactive to light. Right eye exhibits no discharge. Left eye exhibits no discharge. No scleral icterus.   Neck: Neck supple. No JVD present. No tracheal deviation present.   Trach in place   Cardiovascular: Exam reveals no gallop and no friction rub.    No murmur heard.  Atrial flutter   Pulmonary/Chest: No stridor. He has no wheezes. He has rales (scattered crackles bilaterally).   2 chest tubes on the left   Abdominal: Soft. Bowel sounds are normal. He exhibits no distension. There is no tenderness. There is no rebound and no guarding.   cortrak in place - tolerating enteral TF   Genitourinary: Penis normal.   Musculoskeletal: He exhibits no tenderness or deformity.   No clubbing or cyanosis   Neurological:   Awake, alert, nods, follows, moves all 4   Skin: Skin is warm and dry. No rash noted. He is not diaphoretic. No erythema. No pallor.       PFSH:  No change.    Respiratory:     Pulse Oximetry: 97 %  CXR personally reviewed and compared to prior images  CXR with slightly improved left lung opacities    HemoDynamics:  Pulse: (!) 58, Heart Rate (Monitored): 67  Blood Pressure: 112/57, NIBP: (!) 91/62       Neuro:    Fluids:  Intake/Output       01/26/18 0700 - 01/27/18 0659 01/27/18 0700 - 01/28/18 0659 01/28/18 0700 - 01/29/18 0659      3934-3786 9608-0059 Total 8777-3431 2905-6311 Total 7672-0982 7825-0405 Total       Intake    Other  410  30 440  440  90 530  --  -- --    Medications (P.O./ Enteral Liquids) 410 30 440 440 90 530 -- -- --    Enteral  970  1360 2330  1310  1330 2640  --  -- --    Enteral Volume   -- -- --    Free Water / Tube Flush 320 460 780 410 430 840 -- -- --    Total Intake 1380 1390 2770 1750 1420 3170 -- -- --       Output    Urine  800  1150 1950  825  820 1645  --  -- --    Indwelling Cathether 800 1150 1950   -- -- --    Drains  280  27 307  210  -- 210  --  -- --    Left Chest Tube 2  55 3 58 20 -- 20 -- -- --    Left Chest Tube 1 225 24 249 190 -- 190 -- -- --    Total Output 1080 1177 2257 5841 728 0891 -- -- --       Net I/O     300 213 513  -- -- --          Recent Labs      18   0445   SODIUM  147*  146*  145   POTASSIUM  3.8  4.0  4.4   CHLORIDE  112  110  111   CO2  26  29  30   BUN  53*  44*  39*   CREATININE  1.45*  1.19  0.94   MAGNESIUM  2.2   --    --    PHOSPHORUS  5.0*   --    --    CALCIUM  8.4*  8.9  8.9       GI/Nutrition:    Liver Function  Recent Labs      18   0445   GLUCOSE  126*  115*  116*       Heme:  Recent Labs      18   0445   RBC  3.36*  3.34*  3.33*   HEMOGLOBIN  7.7*  7.2*  7.3*   HEMATOCRIT  28.0*  28.1*  28.1*   PLATELETCT  331  335  293       Infectious Disease:  Temp  Av.8 °C (98.2 °F)  Min: 36.7 °C (98.1 °F)  Max: 36.9 °C (98.5 °F)    Recent Labs      185   WBC  16.0*  17.5*  15.9*   NEUTSPOLYS  81.00*  78.80*  77.20*   LYMPHOCYTES  5.60*  7.00*  6.70*   MONOCYTES  8.90  8.30  8.70   EOSINOPHILS  1.80  3.40  4.80   BASOPHILS  0.60  0.70  0.70     Current Facility-Administered Medications   Medication Dose Frequency Provider Last Rate Last Dose   • diltiazem (CARDIZEM) injection 10 mg  10 mg Q4HRS PRN Silvio Hernandez D.O.       • enoxaparin (LOVENOX) inj 40 mg  40 mg DAILY Chandu Justice M.D.   40 mg at 18 0826   • acetaminophen (TYLENOL) tablet 650 mg  650 mg Q6HRS PRN Linnette Espinoza M.D.       • ondansetron (ZOFRAN) syringe/vial injection 4 mg  4 mg Q4HRS PRN Linnette Espinoza M.D.       • ondansetron (ZOFRAN ODT) dispertab 4 mg  4 mg Q4HRS PRN Linnette Espinoza M.D.       • promethazine (PHENERGAN) tablet 12.5-25 mg  12.5-25 mg Q4HRS PRN Linnette Espinoza M.D.       • promethazine (PHENERGAN)  suppository 12.5-25 mg  12.5-25 mg Q4HRS PRN Linnette Espinoza M.D.       • prochlorperazine (COMPAZINE) injection 5-10 mg  5-10 mg Q4HRS PRN Linnette Espinoza M.D.       • amiodarone (CORDARONE) tablet 200 mg  200 mg Q DAY Linnette Espinoza M.D.   200 mg at 01/27/18 0827   • aspirin (ASA) chewable tab 81 mg  81 mg DAILY Linnette Espinoza M.D.   81 mg at 01/27/18 0827   • atorvastatin (LIPITOR) tablet 40 mg  40 mg QHS Linnette Espinoza M.D.   40 mg at 01/27/18 2114   • digoxin (LANOXIN) tablet 125 mcg  125 mcg DAILY AT 1800 Linnette Espinoza M.D.   125 mcg at 01/27/18 1709   • miconazole 2%-zinc oxide (MILTON) topical cream   Q6HRS PRN Linnette Espinoza M.D.       • nystatin (MYCOSTATIN) 699671 UNIT/ML suspension 500,000 Units  5 mL 4X/DAY Linnette Espinoza M.D.   500,000 Units at 01/27/18 2115   • polyethylene glycol/lytes (MIRALAX) PACKET 1 Packet  1 Packet DAILY Linnette Espinoza M.D.   1 Packet at 01/27/18 0828   • quetiapine (SEROQUEL) tablet 50 mg  50 mg Q EVENING Linnette Espinoza M.D.   50 mg at 01/27/18 2115   • temazepam (RESTORIL) capsule 15 mg  15 mg QHS PRN Linnette Espinoza M.D.   15 mg at 01/26/18 2047   • spironolactone (ALDACTONE) tablet 50 mg  50 mg Q DAY Linnette Espinoza M.D.   50 mg at 01/27/18 0827   • lisinopril (PRINIVIL) 10 MG tablet 10 mg  10 mg Q DAY Linnette Espinoza M.D.   10 mg at 01/27/18 0828   • alprazolam (XANAX) tablet 0.5 mg  0.5 mg Q6HRS PRN Linnette Espinoza M.D.   0.5 mg at 01/27/18 0130   • haloperidol lactate (HALDOL) injection 2-5 mg  2-5 mg Q4HRS PRROSANA Espinoza M.D.   5 mg at 01/24/18 2328   • morphine (pf) 4 mg/ml injection 2 mg  2 mg Q4HRS PRROSANA Espinoza M.D.   2 mg at 01/25/18 2323   • nitroglycerin (NITROSTAT) tablet 0.4 mg  0.4 mg Q5 MIN PRN Linnette Espinoza M.D.       • oxycodone immediate-release (ROXICODONE) tablet 5 mg  5 mg Q4HRS PRROSANA Espinoza M.D.   5 mg at 01/28/18 0428   • Divalproex Sodium (DEPAKOTE) capsule 250 mg  250 mg Q8HRS Linnette Espinoza M.D.   250 mg at 01/28/18 0588    • Respiratory Care per Protocol   Continuous RT Chandu Justice M.D.       • senna-docusate (PERICOLACE or SENOKOT S) 8.6-50 MG per tablet 2 Tab  2 Tab BID Chandu Justice M.D.   2 Tab at 01/27/18 2115    And   • polyethylene glycol/lytes (MIRALAX) PACKET 1 Packet  1 Packet QDAY PRN Chandu Justice M.D.        And   • magnesium hydroxide (MILK OF MAGNESIA) suspension 30 mL  30 mL QDAY PRN Chandu Justice M.D.        And   • bisacodyl (DULCOLAX) suppository 10 mg  10 mg QDAY PRN Chandu Justice M.D.       • chlorhexidine (PERIDEX) 0.12 % solution 15 mL  15 mL BID Chandu Justice M.D.   15 mL at 01/27/18 2114   • lidocaine (XYLOCAINE) 1%  injection  1-2 mL Q30 MIN PRN Chandu Justice M.D.       • ipratropium-albuterol (DUONEB) nebulizer solution 3 mL  3 mL Q2HRS PRN (RT) Chandu Justice M.D.       • ipratropium-albuterol (DUONEB) nebulizer solution 3 mL  3 mL Q4HRS (RT) Chandu Justice M.D.   3 mL at 01/28/18 0712   • famotidine (PEPCID) tablet 20 mg  20 mg Q12HRS Chandu Justice M.D.   20 mg at 01/27/18 2115     Last reviewed on 1/25/2018 12:01 PM by Mack Calderon R.N.    Quality  Measures:  Labs reviewed, Medications reviewed and Radiology images reviewed  Ceballos catheter: Critically Ill - Requiring Accurate Measurement of Urinary Output        DVT prophylaxis - mechanical: SCDs  Ulcer prophylaxis: Yes  Antibiotics: Treating active infection/contamination beyond 24 hours perioperative coverage          Assessment and Plan:    Acute hypoxemic respiratory failure    S/P VDRF   - T-piece as tolerated    Trach - 12/30    Recurrent left pleural effusion   - organized hemothorax   - VATS with decortication and pleurodesis on 1/25    COPD - no acute exacerbation   - cont BDs    S/P 2V CABG - 12/22   - cont ASA, statin    DM type 2   - follow glucose    Paroxysmal atrial fibrillation/atrial flutter   - rate control   - cont prophylactic Lovenox   -  cont amiodarone and digoxin    Acute systolic heart failure   - EF 25-30%   - cont current doses of digoxin and lisinopril   - cont aldactone    S/P NSTEMI in December 2017   - cont ASA, statin    Hypernatremia   - cont free water      Discussed with RN, RT, Team, Clinical pharmacist, Hospitalist   No

## 2024-10-29 NOTE — DIETARY
"Nutrition Services: High BMI    Pt is a 52 y.o. Male with Dx: Acute and chronic respiratory failure (acute-on-chronic) (HCC)    Pt admitted late last night.   Pt currently NPO, requiring BiPAP. Orders to ADAT to heart healthy, low sodium, and diabetic.    Ht: 77\", Wt: 156.5 kg, BMI= 40.9  Pt with BMI >40; formal weight loss counseling not appropriate in acute care setting.    Recommend outpatient weight management after D/c.   RD available as indicated.     "
Awake/Alert

## 2024-11-01 NOTE — CARE PLAN
Orders:    Ambulatory Referral to General Surgery     Problem: Venous Thromboembolism (VTW)/Deep Vein Thrombosis (DVT) Prevention:  Goal: Patient will participate in Venous Thrombosis (VTE)/Deep Vein Thrombosis (DVT)Prevention Measures  Outcome: PROGRESSING AS EXPECTED

## 2025-01-24 NOTE — PROGRESS NOTES
Pulmonary Critical Care Progress Note      Date of service:  1/23/2018    Chief Complaint:  Respiratory failure    Interval Events:  24 hour interval history reviewed  Reason for visit:  Respiratory failure, pleural effusion  Unable to provide ROS due to medical condition       - Atrial flutter   - recurrent left pleural effusion   - trach   - cortrak in place      Review of Systems   Unable to perform ROS: Medical condition       Physical Exam   Constitutional:   On vent   HENT:   Head: Normocephalic and atraumatic.   Right Ear: External ear normal.   Left Ear: External ear normal.   Nose: Nose normal.   Mouth/Throat: Oropharynx is clear and moist. No oropharyngeal exudate.   Eyes: Conjunctivae are normal. Pupils are equal, round, and reactive to light. Right eye exhibits no discharge. Left eye exhibits no discharge. No scleral icterus.   Neck: Normal range of motion. Neck supple. No JVD present. No tracheal deviation present.   Trach in place   Cardiovascular: Exam reveals no gallop and no friction rub.    No murmur heard.  Atrial flutter   Pulmonary/Chest: No stridor. He has no wheezes. He has rales (coarse crackles bilaterally).   Abdominal: Soft. Bowel sounds are normal. He exhibits no distension. There is no tenderness. There is no rebound and no guarding.   cortrak in place - tolerating enteral TF   Musculoskeletal: He exhibits no tenderness or deformity.   No clubbing or cyanosis   Neurological:   Awake, alert, nods, follows, moves all 4   Skin: Skin is warm and dry. No rash noted. He is not diaphoretic. No erythema. No pallor.       PFSH:  No change.    Respiratory:  Leo Vent Mode: APVCMV, Rate (breaths/min): 20, Vt Target (mL): 510, PEEP/CPAP: 10, FiO2: 30, Static Compliance (ml / cm H2O): 57, Control VTE (exp VT): 680     Vent waveforms and airway mechanics reviewed in detail.  CXR personally reviewed and compared to prior images  CXR with increased left pleural effusion    HemoDynamics:  Heart Rate  (Monitored): 60          Neuro:      Fluids:  Intake/Output     None           No results for input(s): SODIUM, POTASSIUM, CHLORIDE, CO2, BUN, CREATININE, MAGNESIUM, PHOSPHORUS, CALCIUM in the last 72 hours.    GI/Nutrition:    Liver Function  No results for input(s): ALTSGPT, ASTSGOT, ALKPHOSPHAT, TBILIRUBIN, DBILIRUBIN, GAMMAGT, AMYLASE, LIPASE, ALB, PREALBUMIN, GLUCOSE in the last 72 hours.    Heme:  No results for input(s): RBC, HEMOGLOBIN, HEMATOCRIT, PLATELETCT, PROTHROMBTM, APTT, INR, IRON, FERRITIN, TOTIRONBC in the last 72 hours.    Infectious Disease:  No Data Recorded        Current Facility-Administered Medications   Medication Dose Frequency Provider Last Rate Last Dose   • acetaminophen (TYLENOL) tablet 650 mg  650 mg Q6HRS PRN Linnette Espinoza M.D.       • senna-docusate (PERICOLACE or SENOKOT S) 8.6-50 MG per tablet 2 Tab  2 Tab BID Linnette Espinoza M.D.        And   • polyethylene glycol/lytes (MIRALAX) PACKET 1 Packet  1 Packet QDAY PRN Linnette Espinoza M.D.        And   • magnesium hydroxide (MILK OF MAGNESIA) suspension 30 mL  30 mL QDAY PRN Linnette Espinoza M.D.        And   • bisacodyl (DULCOLAX) suppository 10 mg  10 mg QDAY PRN Linnette Espinoza M.D.       • Respiratory Care per Protocol   Continuous RT Linnette Espinoza M.D.       • ondansetron (ZOFRAN) syringe/vial injection 4 mg  4 mg Q4HRS PRROSANA Espinoza M.D.       • ondansetron (ZOFRAN ODT) dispertab 4 mg  4 mg Q4HRS PRN Linnette Espinoza M.D.       • promethazine (PHENERGAN) tablet 12.5-25 mg  12.5-25 mg Q4HRS PRROSANA Espinoza M.D.       • promethazine (PHENERGAN) suppository 12.5-25 mg  12.5-25 mg Q4HRS PRROSANA Espinoza M.D.       • prochlorperazine (COMPAZINE) injection 5-10 mg  5-10 mg Q4HRS PRN Linnette Espinoza M.D.       • [START ON 1/24/2018] amiodarone (CORDARONE) tablet 200 mg  200 mg Q DAY Linnette Espinoza M.D.       • [START ON 1/24/2018] aspirin (ASA) chewable tab 81 mg  81 mg DAILY Linnette Espinoza M.D.       • atorvastatin  (LIPITOR) tablet 40 mg  40 mg QHS Linnette Espinoza M.D.       • chlorhexidine (PERIDEX) 0.12 % solution 15 mL  15 mL BID Linnette Espinoza M.D.       • digoxin (LANOXIN) tablet 125 mcg  125 mcg DAILY AT 1800 Linnette Espinoza M.D.       • famotidine (PEPCID) tablet 20 mg  20 mg BID Linnette Espinoza M.D.       • miconazole 2%-zinc oxide (MILTON) topical cream   Q6HRS PRN Linnette Espinoza M.D.       • nystatin (MYCOSTATIN) 818272 UNIT/ML suspension 500,000 Units  5 mL 4X/DAY Linnette Espinoza M.D.   Stopped at 01/23/18 1700   • [START ON 1/24/2018] polyethylene glycol/lytes (MIRALAX) PACKET 1 Packet  1 Packet DAILY Linnette Espinoza M.D.       • quetiapine (SEROQUEL) tablet 50 mg  50 mg Q EVENING Linnette Espinoza M.D.       • temazepam (RESTORIL) capsule 15 mg  15 mg QHS PRN Linnette Espinoza M.D.       • [START ON 1/24/2018] spironolactone (ALDACTONE) tablet 50 mg  50 mg Q DAY Linnette Espinoza M.D.       • [START ON 1/24/2018] lisinopril (PRINIVIL) 10 MG tablet 10 mg  10 mg Q DAY Linnette Espinoza M.D.       • alprazolam (XANAX) tablet 0.5 mg  0.5 mg Q6HRS PRN Linnette Espinoza M.D.       • haloperidol lactate (HALDOL) injection 2-5 mg  2-5 mg Q4HRS PRN Linnette Espinoza M.D.       • morphine (pf) 4 mg/ml injection 2 mg  2 mg Q4HRS PRN Linnette Espinoza M.D.       • nitroglycerin (NITROSTAT) tablet 0.4 mg  0.4 mg Q5 MIN PRN Linnette Espinoza M.D.       • oxycodone immediate-release (ROXICODONE) tablet 5 mg  5 mg Q4HRS PRN Linnette Espinoza M.D.       • ipratropium-albuterol (DUONEB) nebulizer solution 3 mL  3 mL Q4H PRN (RT) Linnette Espinoza M.D.       • levofloxacin in D5W (LEVAQUIN) IVPB 500 mg  500 mg Q24HRS Linnette Espinoza M.D. 100 mL/hr at 01/23/18 1803 500 mg at 01/23/18 1803   • Divalproex Sodium (DEPAKOTE) capsule 250 mg  250 mg Q8HRS Linnette Espinoza M.D.         Last reviewed on 12/22/2017  7:07 AM by Katie Aly R.N.    Quality  Measures:  Labs reviewed, Medications reviewed and Radiology images reviewed  Ceballos catheter:  Critically Ill - Requiring Accurate Measurement of Urinary Output        DVT prophylaxis - mechanical: SCDs  Ulcer prophylaxis: Yes  Antibiotics: Treating active infection/contamination beyond 24 hours perioperative coverage          Assessment and Plan:    Acute hypoxemic respiratory failure    VDRF   - cont vent support    Trach - 12/30    Query pneumonia   - has received 5 days of Levaquin   - stop Levaquin    Recurrent left pleural effusion   - surgical eval for VATS    COPD - no acute exacerbation   - cont BDs    S/P 2V CABG - 12/22   - cont ASA, statin    DM type 2   - follow glucose    Paroxysmal atrial fibrillation/atrial flutter   - rate control   - hold anticoagulation in anticipation of thoracic surgery   - cont amiodarone and digoxin    Acute systolic heart failure   - EF 25-30%   - cont digoxin and lisinopril   - cont aldactone    S/P NSTEMI in December 2017   - cont ASA, statin      I have assessed and reassessed his ventilator waveforms, airway mechanics, respiratory status with adjustments in vent settings.  He is at increased risk for worsening respiratory system dysfunction.    High risk of deterioration and worsening vital organ dysfunction and death without the above critical care interventions.    Critical Care Time:  35 minutes  [id number]  No time overlap  Time excludes procedures  Discussed with RN, RT, Team, Clinical pharmacist, Hospitalist   Xray Chest 1 View- PORTABLE-Urgent

## (undated) DEVICE — CONTAINER SPECIMEN BAG OR - STERILE 4 OZ W/LID (100EA/CA)

## (undated) DEVICE — RESERVOIR SUCTION 100 CC - SILICONE (20EA/CA)

## (undated) DEVICE — KIT ROOM DECONTAMINATION

## (undated) DEVICE — GOWN SURGEONS X-LARGE - DISP. (30/CA)

## (undated) DEVICE — SYRINGE 10 ML CONTROL LL (25EA/BX 4BX/CA)

## (undated) DEVICE — TUBING CLEARLINK DUO-VENT - C-FLO (48EA/CA)

## (undated) DEVICE — SUTURE 3-0 ETHILON FSLX 30 (36PK/BX)"

## (undated) DEVICE — BLADE SURGICAL CLIPPER - (50EA/CA)

## (undated) DEVICE — BLADE SAGITTAL SAW DUAL CUT 75.0 X 25.0MM (1/EA)

## (undated) DEVICE — LACTATED RINGERS INJ 1000 ML - (14EA/CA 60CA/PF)

## (undated) DEVICE — KIT EVACUATER 3 SPRING PVC LF 1/8 DRAIN SIZE (10EA/CA)"

## (undated) DEVICE — PADDING CAST 6 IN STERILE - 6 X 4 YDS (24/CA)

## (undated) DEVICE — DRESSING KIT V.A.C. SENSA T.R.A.C. LARGE (10EA/CA)

## (undated) DEVICE — IMMOBILIZER KNEE 20 INCH - (1/EA)

## (undated) DEVICE — GLOVE BIOGEL PI INDICATOR SZ 6.5 SURGICAL PF LF - (50/BX 4BX/CA)

## (undated) DEVICE — SUTURE 5-0 PROLENE C-1 D/A 24 (36PK/BX)"

## (undated) DEVICE — ELECTRODE 850 FOAM ADHESIVE - HYDROGEL RADIOTRNSPRNT (50/PK)

## (undated) DEVICE — CONNECTOR Y TBG CRTY 5 IN 1 STERILE (50EA/CA)

## (undated) DEVICE — PAD LAP STERILE 18 X 18 - (5/PK 40PK/CA)

## (undated) DEVICE — STAPLER SKIN DISP - (6/BX 10BX/CA) VISISTAT

## (undated) DEVICE — TUBE CHEST 32FR. STRAIGHT - (10EA/CA)

## (undated) DEVICE — TROCAR 12MM THORACOPORT (6EA/BX)

## (undated) DEVICE — MASK ANESTHESIA ADULT  - (100/CA)

## (undated) DEVICE — WIRE TEMPORARY CARDIAC PACING ATRIUM SS 24 (12PK/BX)"

## (undated) DEVICE — GLOVE BIOGEL INDICATOR SZ 8 SURGICAL PF LTX - (50/BX 4BX/CA)

## (undated) DEVICE — BLADE SURGICAL #11 - (50/BX)

## (undated) DEVICE — KIT ANESTHESIA W/CIRCUIT & 3/LT BAG W/FILTER (20EA/CA)

## (undated) DEVICE — DRAPE CHEST/BREAST - (12EA/CA)

## (undated) DEVICE — PROTECTOR ULNA NERVE - (36PR/CA)

## (undated) DEVICE — TAPE CLOTH MEDIPORE 6 INCH - (12RL/CA)

## (undated) DEVICE — CLOSURE SKIN STRIP 1/2 X 4 IN - (STERI STRIP) (50/BX 4BX/CA)

## (undated) DEVICE — PACK E SUTURE USED FOR - OPEN HEART  (5/BX)

## (undated) DEVICE — BONE WAX (12PK/BX)

## (undated) DEVICE — KIT RADIAL ARTERY 20GA W/MAX BARRIER AND BIOPATCH  (5EA/CA) #10740 IS FOR THE SET RADIAL ARTERIAL

## (undated) DEVICE — BLADE STERNUM SAW SURGICAL 32.0 X 6.4 MM STERILE (1/EA)

## (undated) DEVICE — SOD. CHL. INJ. 0.9% 1000 ML - (14EA/CA 60CA/PF)

## (undated) DEVICE — BLADE SURGICAL #15 - (50/BX 3BX/CA)

## (undated) DEVICE — HEAD HOLDER JUNIOR/ADULT

## (undated) DEVICE — SUTURE 2-0 VICRYL PLUS CT-1 36 (36PK/BX)"

## (undated) DEVICE — SODIUM CHL IRRIGATION 0.9% 1000ML (12EA/CA)

## (undated) DEVICE — PACK LOWER EXTREMITY - (2/CA)

## (undated) DEVICE — SET LEADWIRE 5 LEAD BEDSIDE DISPOSABLE ECG (1SET OF 5/EA)

## (undated) DEVICE — PACK VEIN - (19/CA)

## (undated) DEVICE — SUTURE GENERAL

## (undated) DEVICE — TOWELS CLOTH SURGICAL - (4/PK 20PK/CA)

## (undated) DEVICE — SOD. CHL 10CC SYRINGE PREFILL - W/10 CC (30/BX)

## (undated) DEVICE — WRAP COBAN SELF-ADHERENT 6 IN X  5YDS STERILE TAN (12/CA)

## (undated) DEVICE — TUBE LUKI TRAP STERILE DISP. - 8CC (12/BX 4BX/CA)

## (undated) DEVICE — KIT CUSTOM PROCEDURE SINGLE FOR ENDO  (15/CA)

## (undated) DEVICE — SYRINGE 6 CC 20 GA X 1 1/2 - NDL SAFETY  (50/BX)

## (undated) DEVICE — INSERT STEALTH #5 - (10/BX)

## (undated) DEVICE — SUTURE 2-0 VICRYL PLUS CT-2 - 27 INCH (36/BX)

## (undated) DEVICE — STOCKINET BIAS 6 IN STERILE - (20/CA)

## (undated) DEVICE — TUBING PRSS MNTR 72IN M/ M LL - (25/BX) MONIT. LINE W/MALE L/L

## (undated) DEVICE — DILATORS PARSONNET 1.0MM - (5EA/CA)

## (undated) DEVICE — SYRINGE ASEPTO - (50EA/CA

## (undated) DEVICE — CLIP LG INTNL HRZN TI ESCP LGT - (20/BX)

## (undated) DEVICE — CANISTER SUCTION 3000ML MECHANICAL FILTER AUTO SHUTOFF MEDI-VAC NONSTERILE LF DISP  (40EA/CA)

## (undated) DEVICE — DRAPE SURGICAL U 77X120 - (10/CA)

## (undated) DEVICE — GLOVE BIOGEL SZ 8.5 SURGICAL PF LTX - (50PR/BX 4BX/CA)

## (undated) DEVICE — SUTURE OHS

## (undated) DEVICE — BOVIE BLADE COATED &INSULATED - 25/PK

## (undated) DEVICE — NEPTUNE 4 PORT MANIFOLD - (20/PK)

## (undated) DEVICE — BANDAGE ELASTIC 6 HONEYCOMB - 6X5YD LF (20/CA)"

## (undated) DEVICE — FILM CASSETTE ENDO

## (undated) DEVICE — SUTURE 4-0 VICRYL PLUS RB-1 - 27 INCH (36/BX)

## (undated) DEVICE — SLEEVE, VASO, THIGH, MED

## (undated) DEVICE — DRAIN CHEST ADULT (6EA/CA) DELETED ITEM  ORDER #15909

## (undated) DEVICE — GLOVE BIOGEL PI ORTHO SZ 8 PF LF (40PR/BX)

## (undated) DEVICE — CATHETER THERMALDILUTION SWAN - (5EA/CA)

## (undated) DEVICE — GLOVE BIOGEL ECLIPSE  PF LATEX SIZE 6.5 (50PR/BX)

## (undated) DEVICE — FIBRILLAR SURGICEL 4X4 - 10/CA

## (undated) DEVICE — SUTURE ETHILON 2-0 FSLX 30 (36PK/BX)"

## (undated) DEVICE — MICRODRIP PRIMARY VENTED 60 (48EA/CA) THIS WAS PART #2C8428 WHICH WAS DISCONTINUED

## (undated) DEVICE — BAG, SPONGE COUNT 50600

## (undated) DEVICE — BLANKET UNDERBODY FULL ACCES - (5/CA)

## (undated) DEVICE — SENSOR CEREBRAL AND SOMATIC MONITORING (20/CA)

## (undated) DEVICE — DRAPE INCISE  INVISISHIELD 36 X 17.5 IN - (40/CA)

## (undated) DEVICE — SENSOR SPO2 NEO LNCS ADHESIVE (20/BX) SEE USER NOTES

## (undated) DEVICE — DRAPE LARGE 3 QUARTER - (20/CA)

## (undated) DEVICE — SET EXTENSION WITH 2 PORTS (48EA/CA) ***PART #2C8610 IS A SUBSTITUTE*****

## (undated) DEVICE — GLOVE BIOGEL INDICATOR SZ 7.5 SURGICAL PF LTX - (50PR/BX 4BX/CA)

## (undated) DEVICE — DRAPE LOWER EXTREMETY - (6/CA)

## (undated) DEVICE — GLOVE BIOGEL SZ 8 SURGICAL PF LTX - (50PR/BX 4BX/CA)

## (undated) DEVICE — SUCTION INSTRUMENT YANKAUER BULBOUS TIP W/O VENT (50EA/CA)

## (undated) DEVICE — VAC CANISTER W/GEL 500ML - FITS NEW MACHINES (10EA/CA)

## (undated) DEVICE — STOPCOCK MALE 4-WAY - (50/CA)

## (undated) DEVICE — BAG RESUSCITATION DISPOSABLE - WITH MASK (10 EA/CA)

## (undated) DEVICE — GLOVE BIOGEL SZ 7 SURGICAL PF LTX - (50PR/BX 4BX/CA)

## (undated) DEVICE — SUTURE 4-0 PROLENE RB-1 D/A 36 (36PK/BX)"

## (undated) DEVICE — SUTURE 0 COATED VICRYL PLUS - CTX CR(12/BX)18 IN

## (undated) DEVICE — DRAPE MAYO STAND - (30/CA)

## (undated) DEVICE — SUTURE 6-0 PROLENE RB-2 D/A 30 (36PK/BX)"

## (undated) DEVICE — GLOVE SURGICAL LATEX POWDER FREE STIRLE SZ 8 ENCORE MICROPTIC (200PR/CA)

## (undated) DEVICE — SUTURE 2-0 VICRYL PLUS CTX - 36 INCH (36/BX)

## (undated) DEVICE — PUNCH 4MM SHRP CNCL TIP DL - (6/BX)

## (undated) DEVICE — BANDAGE ELASTIC 6 IN X 5 YDS - LATEX FREE (10/BX)

## (undated) DEVICE — SET SUCTION/IRRIGATION WITH DISPOSABLE TIP (6/CA )PART #0250-070-520 IS A SUB

## (undated) DEVICE — SET FLUID WARMING STANDARD FLOW - (10/CA)

## (undated) DEVICE — PACK CV DRAPING/BASIN 2PART - (1/CA)

## (undated) DEVICE — GLOVE BIOGEL PI ORTHO SZ 6 1/2 SURGICAL PF LF (40PR/BX)

## (undated) DEVICE — CHLORAPREP 26 ML APPLICATOR - ORANGE TINT(25/CA)

## (undated) DEVICE — TUBING INSUFFLATION - (10/BX)

## (undated) DEVICE — CLIP SM INTNL HRZN TI ESCP LGT - (24EA/PK 25PK/BX)

## (undated) DEVICE — TOURNIQUET, STERILE 24 (YELLOW)

## (undated) DEVICE — DERMABOND ADVANCED - (12EA/BX)

## (undated) DEVICE — ENDO PEANUT 5MM DEVICE (12EA/BX)

## (undated) DEVICE — BANDAGE ELASTIC STERILE MATRIX 6 X 10 (20EA/CA)

## (undated) DEVICE — GLOVE SZ 7 BIOGEL PI MICRO - PF LF (50PR/BX 4BX/CA)

## (undated) DEVICE — SODIUM CHL. INJ. 0.9% 500ML (24EA/CA 50CA/PF)

## (undated) DEVICE — NEEDLE NON SAFETY HYPO 22 GA X 1 1/2 IN (100/BX)

## (undated) DEVICE — DRESSING, WOUND VAC MED.

## (undated) DEVICE — TUBE TRACHEAL ORAL/NASAL E-T HI-LO CUFF 9.0MM (10EA/PK)

## (undated) DEVICE — CUP DENTURE W/ LID - (200/CA)

## (undated) DEVICE — Device

## (undated) DEVICE — SUTURE 0 SILK CT-1 (36PK/BX)

## (undated) DEVICE — SOLUTION NORMOSOL-4 INJ 1000ML

## (undated) DEVICE — SPONGE DRAIN 4 X 4IN 6-PLY - (2/PK25PK/BX12BX/CS)

## (undated) DEVICE — CATHETER SUCTION 14 FR. WITH CONTROL PORT (100/CS)

## (undated) DEVICE — ELECTRODE DUAL RETURN W/ CORD - (50/PK)

## (undated) DEVICE — SUTURE 3-0 ETHILON PS-1 (36PK/BX)

## (undated) DEVICE — PUNCH DISP VASCULAR 4.4 - 6/BX

## (undated) DEVICE — ARMBOARD  SMALL IV 9 INLONG - (25EA/CA)

## (undated) DEVICE — CATHETER TROCAR 28FR.

## (undated) DEVICE — CLIP MED INTNL HRZN TI ESCP - (25/BX)

## (undated) DEVICE — GLOVE BIOGEL SZ 6.5 SURGICAL PF LTX (50PR/BX 4BX/CA)

## (undated) DEVICE — SET BIFURCATED BLOOD - (48EA/CS)

## (undated) DEVICE — KIT TOURNIQUET DLP (40EA/PK)

## (undated) DEVICE — SYS DLV COST CLS RM TEMP - INJECTATE (CO-SET II) (10EA/CA)

## (undated) DEVICE — BLADE SAW 90X25X1.37MM SAGITTAL DUAL CUT

## (undated) DEVICE — GLOVE BIOGEL SZ 7.5 SURGICAL PF LTX - (50PR/BX 4BX/CA)

## (undated) DEVICE — BANDAGE ELASTIC 4 IN X 5 YDS - LATEX FREE(10/BX 5BX/CA)

## (undated) DEVICE — SUTURE 1 VICRYL PLUS CTX - 8 X 18 INCH (12/BX)

## (undated) DEVICE — KIT INTROPERCUTANEOUS SHEATH - 8.5 FR W/MAX BARRIER AND BIOPATCH  (5/CA)

## (undated) DEVICE — SYRINGE SAFETY 3 ML 18 GA X 1 1/2 BLUNT LL (100/BX 8BX/CA)

## (undated) DEVICE — SUTURE 4-0 VICRYL PLUS FS-2 - 27 INCH (36/BX)

## (undated) DEVICE — SET CATHETER CENTRAL VENOUS 5X15 ORDER BY THE EACH

## (undated) DEVICE — ANTI-FOG SOLUTION - 60BTL/CA

## (undated) DEVICE — SUTURE 5 SURGICAL STEEL V-40 - (12/BX) CCS CURRENT

## (undated) DEVICE — SUTURE 5 ETHIBOND V-37 (12PK/BX)

## (undated) DEVICE — NEEDLE SAFETY 18 GA X 1 1/2 IN (100EA/BX)

## (undated) DEVICE — STERI STRIP COMPOUND BENZOIN - TINCTURE 0.6ML WITH APPLICATOR (40EA/BX)

## (undated) DEVICE — GLOVE BIOGEL INDICATOR SZ 8.5 SURGICAL PF LTX - (50/BX 4BX/CA)

## (undated) DEVICE — SET IRRIGATION CYSTOSCOPY TUBE L80 IN (20EA/CA)

## (undated) DEVICE — GLOVE BIOGEL PI INDICATOR SZ 7.0 SURGICAL PF LF - (50/BX 4BX/CA)

## (undated) DEVICE — SPRING BULLDOG 1/2 FORCE BLUE - (10/BX)

## (undated) DEVICE — KIT DRESSING WOUND VAC ABDOMEN W/TRAC PAD (5EA/CA)

## (undated) DEVICE — GLOVE BIOGEL PI ORTHO SZ 7.5 PF LF (40PR/BX)

## (undated) DEVICE — SUCTION INSTRUMENT YANKAUER OPEN TIP W/O VENT (50EA/CA)

## (undated) DEVICE — SYRINGE 30 ML LL (56/BX)

## (undated) DEVICE — GOWN WARMING STANDARD FLEX - (30/CA)

## (undated) DEVICE — SUTURE 2-0 VICRYL PLUS CT-1 - 8 X 18 INCH(12/BX)

## (undated) DEVICE — TROCAR Z THREAD11MM OPTICAL - NON BLADED(6/BX)

## (undated) DEVICE — CUFF TOURNIQUET 44 X 4 ONE PORT DISP - STERILE (10/CA)

## (undated) DEVICE — TRAY SURESTEP FOLEY TEMP SENSING 16FR (10EA/CA) ORDER  #18764 FOR TEMP FOLEY ONLY

## (undated) DEVICE — TRANSDUCER BIFURCATED MONITORING KIT (10EA/CA)

## (undated) DEVICE — SUTURE 0 VICRYL PLUS CT-1 - 8 X 18 INCH (12/BX)

## (undated) DEVICE — SUTURE 0 SILK TIES (36PK/BX)

## (undated) DEVICE — TRAY SKIN SCRUB PVP WET (20EA/CA) PART #DYND70356 DISCONTINUED

## (undated) DEVICE — TROCAR 5X100 NON BLADED Z-TH - READ KII (6/BX)

## (undated) DEVICE — PACK MAJOR BASIN - (2EA/CA)

## (undated) DEVICE — GLOVE BIOGEL INDICATOR SZ 7SURGICAL PF LTX - (50/BX 4BX/CA)

## (undated) DEVICE — LEAD PACING TEMP MYO - (12/BX)

## (undated) DEVICE — DRESSING PETROLEUM GAUZE 5 X 9" (50EA/BX 4BX/CA)"

## (undated) DEVICE — SET INTRO MIRCROPUNCTURE - MPIS-501-SST

## (undated) DEVICE — SYRINGE DISP. 50CC LS - (40/BX)

## (undated) DEVICE — SUTURE 7-0 PROLENE 24BV175-6 - EP8735H (36/BX)"

## (undated) DEVICE — SUTURE 4-0 30CM STRATAFIX SPIRAL PS-2 (12EA/BX)

## (undated) DEVICE — DRAPE U ORTHOPEDIC - (10/BX)

## (undated) DEVICE — SYSTEM PREVENA DRESSING CUSTOMIZABLE (5EA/CA)

## (undated) DEVICE — TUBE CHEST 32FR. RIGHT ANGLED (10EA/CA)

## (undated) DEVICE — CON SEDATION/>5 YR 1ST 15 MIN

## (undated) DEVICE — TUBE CONNECT SUCTION CLEAR 120 X 1/4" (50EA/CA)"

## (undated) DEVICE — BLANKET WARMING LOWER BODY - (10/CA) INACTIVE USE #8585

## (undated) DEVICE — DRAPESURG STERI-DRAPE LONG - (10/BX 4BX/CA)

## (undated) DEVICE — MASK WITH FACE SHIELD (25/BX 4BX/CA)

## (undated) DEVICE — SYRINGE 3 CC 22 GA X 1-1/2 - NDL SAFETY (50/BX 8BX/CA)

## (undated) DEVICE — KIT ENDOHARVEST SYSTEM 8 - MUST ORDER 5 AT A TIME

## (undated) DEVICE — WATER IRRIG. STER. 1500 ML - (9/CA)

## (undated) DEVICE — DRESSING ABDOMINAL PAD STERILE 8 X 10" (360EA/CA)"

## (undated) DEVICE — ELECTRODE 5MM LHK LAPSCP STERILE DISP- MEGADYNE  (5/CA)

## (undated) DEVICE — BAG DECANTER (50EA/CS)